# Patient Record
Sex: MALE | Race: WHITE | NOT HISPANIC OR LATINO | Employment: OTHER | URBAN - METROPOLITAN AREA
[De-identification: names, ages, dates, MRNs, and addresses within clinical notes are randomized per-mention and may not be internally consistent; named-entity substitution may affect disease eponyms.]

---

## 2017-01-10 ENCOUNTER — OFFICE VISIT (OUTPATIENT)
Dept: PODIATRY | Facility: CLINIC | Age: 72
End: 2017-01-10
Payer: MEDICARE

## 2017-01-10 VITALS — TEMPERATURE: 97.4 F | DIASTOLIC BLOOD PRESSURE: 64 MMHG | SYSTOLIC BLOOD PRESSURE: 142 MMHG

## 2017-01-10 DIAGNOSIS — I87.8 VENOUS STASIS: ICD-10-CM

## 2017-01-10 DIAGNOSIS — B35.1 DERMATOPHYTOSIS OF NAIL: ICD-10-CM

## 2017-01-10 DIAGNOSIS — E11.49 DIABETIC NEUROPATHY WITH NEUROLOGIC COMPLICATION (H): ICD-10-CM

## 2017-01-10 DIAGNOSIS — L97.312 ULCER OF ANKLE, RIGHT, WITH FAT LAYER EXPOSED (H): Primary | ICD-10-CM

## 2017-01-10 DIAGNOSIS — E11.40 DIABETIC NEUROPATHY WITH NEUROLOGIC COMPLICATION (H): ICD-10-CM

## 2017-01-10 PROCEDURE — 99213 OFFICE O/P EST LOW 20 MIN: CPT | Performed by: PODIATRIST

## 2017-01-10 RX ORDER — SILVER SULFADIAZINE 10 MG/G
CREAM TOPICAL 2 TIMES DAILY
Qty: 85 G | Refills: 4 | Status: SHIPPED | OUTPATIENT
Start: 2017-01-10 | End: 2017-01-17

## 2017-01-10 RX ORDER — AMMONIUM LACTATE 12 G/100G
CREAM TOPICAL 2 TIMES DAILY PRN
Qty: 385 G | Refills: 3 | Status: ON HOLD | OUTPATIENT
Start: 2017-01-10 | End: 2017-08-30

## 2017-01-10 RX ORDER — TRIAMCINOLONE ACETONIDE 1 MG/G
CREAM TOPICAL
Qty: 453.6 G | Refills: 0 | Status: ON HOLD | OUTPATIENT
Start: 2017-01-10 | End: 2017-08-25

## 2017-01-10 RX ORDER — KETOCONAZOLE 20 MG/G
CREAM TOPICAL 2 TIMES DAILY
Qty: 60 G | Refills: 4 | Status: ON HOLD | OUTPATIENT
Start: 2017-01-10 | End: 2017-08-25

## 2017-01-10 NOTE — NURSING NOTE
"Amos Walker's goals for this visit include: Evaluate right shin wound  He requests these members of his care team be copied on today's visit information: yes    PCP: Silvina Patiño    Referring Provider:  No referring provider defined for this encounter.    Chief Complaint   Patient presents with     RECHECK     New wound on right shin, x 30 days       Initial /64 mmHg  Temp(Src) 97.4  F (36.3  C) Estimated body mass index is 24.52 kg/(m^2) as calculated from the following:    Height as of 6/2/15: 1.892 m (6' 2.49\").    Weight as of 10/31/16: 87.771 kg (193 lb 8 oz).  BP completed using cuff size: regular    "

## 2017-01-10 NOTE — PATIENT INSTRUCTIONS
Thanks for coming today.  [facility]  [address] [place] [address]    To schedule future appointments in Ortho Clinic, you may call [phone].    To schedule ordered imaging by your provider:   Call [facility] [phone]    To schedule an injection ordered by your provider:  Call [facility] Injection scheduling line: [phone]  [email or web address] available online at:  [email or web address]    Please call if any further questions or concerns ([phone]).  Clinic hours 8 am to 5 pm.    Return to clinic (call) if symptoms worsen or fail to improve.
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
## 2017-01-11 DIAGNOSIS — Z13.6 ENCOUNTER FOR ABDOMINAL AORTIC ANEURYSM (AAA) SCREENING: Primary | ICD-10-CM

## 2017-01-18 ENCOUNTER — OFFICE VISIT (OUTPATIENT)
Dept: PODIATRY | Facility: CLINIC | Age: 72
End: 2017-01-18
Payer: MEDICARE

## 2017-01-18 VITALS — SYSTOLIC BLOOD PRESSURE: 145 MMHG | DIASTOLIC BLOOD PRESSURE: 70 MMHG | OXYGEN SATURATION: 100 % | HEART RATE: 97 BPM

## 2017-01-18 DIAGNOSIS — E11.49 DIABETIC NEUROPATHY WITH NEUROLOGIC COMPLICATION (H): ICD-10-CM

## 2017-01-18 DIAGNOSIS — E11.40 DIABETIC NEUROPATHY WITH NEUROLOGIC COMPLICATION (H): ICD-10-CM

## 2017-01-18 DIAGNOSIS — I87.8 VENOUS STASIS: ICD-10-CM

## 2017-01-18 DIAGNOSIS — L97.312 ULCER OF ANKLE, RIGHT, WITH FAT LAYER EXPOSED (H): Primary | ICD-10-CM

## 2017-01-18 PROCEDURE — 97597 DBRDMT OPN WND 1ST 20 CM/<: CPT | Performed by: PODIATRIST
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
## 2017-01-18 NOTE — NURSING NOTE
"Amos Walker's goals for this visit include: Find a solution for right leg wound  He requests these members of his care team be copied on today's visit information: yes    PCP: Silvina Patiño    Referring Provider:  No referring provider defined for this encounter.    Chief Complaint   Patient presents with     RECHECK     Right shin wound recheck       Initial /70 mmHg  Pulse 97  SpO2 100% Estimated body mass index is 24.52 kg/(m^2) as calculated from the following:    Height as of 6/2/15: 1.892 m (6' 2.49\").    Weight as of 10/31/16: 87.771 kg (193 lb 8 oz).  BP completed using cuff size: large    "

## 2017-01-18 NOTE — PATIENT INSTRUCTIONS
Thanks for coming today.  [facility]  [address] [place] [address]    To schedule future appointments in Ortho Clinic, you may call [phone].    To schedule ordered imaging by your Provider: Call [facility] at [phone]    [employer] available online at:   [email or web address]    Please call if any further questions or concerns [phone] and ask for the Orthopedic Department. Clinic hours 8 am to 5 pm.    Return to clinic if symptoms worsen.

## 2017-01-24 ENCOUNTER — OFFICE VISIT (OUTPATIENT)
Dept: PODIATRY | Facility: CLINIC | Age: 72
End: 2017-01-24
Payer: MEDICARE

## 2017-01-24 VITALS — DIASTOLIC BLOOD PRESSURE: 64 MMHG | SYSTOLIC BLOOD PRESSURE: 140 MMHG

## 2017-01-24 DIAGNOSIS — E11.40 DIABETIC NEUROPATHY WITH NEUROLOGIC COMPLICATION (H): ICD-10-CM

## 2017-01-24 DIAGNOSIS — E11.49 DIABETIC NEUROPATHY WITH NEUROLOGIC COMPLICATION (H): ICD-10-CM

## 2017-01-24 DIAGNOSIS — L97.912 ULCER OF RIGHT LOWER LEG, WITH FAT LAYER EXPOSED (H): Primary | ICD-10-CM

## 2017-01-24 DIAGNOSIS — I87.8 VENOUS STASIS: ICD-10-CM

## 2017-01-24 PROCEDURE — 97597 DBRDMT OPN WND 1ST 20 CM/<: CPT | Performed by: PODIATRIST

## 2017-01-24 ASSESSMENT — PAIN SCALES - GENERAL: PAINLEVEL: MILD PAIN (2)
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
## 2017-01-24 NOTE — NURSING NOTE
"Amos Walker's goals for this visit include: Recheck right ankle ulcer.  He requests these members of his care team be copied on today's visit information: yes    PCP: Silvina Patiño    Referring Provider:  No referring provider defined for this encounter.    Chief Complaint   Patient presents with     RECHECK     Recheck right ankle ulcer       Initial /64 mmHg Estimated body mass index is 24.52 kg/(m^2) as calculated from the following:    Height as of 6/2/15: 1.892 m (6' 2.49\").    Weight as of 10/31/16: 87.771 kg (193 lb 8 oz).  BP completed using cuff size: regular    "

## 2017-01-24 NOTE — PATIENT INSTRUCTIONS
Thanks for coming today.  [facility]  [address] [place] [address]    To schedule future appointments in Ortho Clinic, you may call [phone].    To schedule ordered imaging by your provider:   Call [facility] [phone]    To schedule an injection ordered by your provider:  Call [facility] Injection scheduling line: [phone]  [facility]hart available online at:  [email or web address]    Please call if any further questions or concerns ([phone]).  Clinic hours 8 am to 5 pm.    Return to clinic (call) if symptoms worsen or fail to improve.

## 2017-01-26 ENCOUNTER — OFFICE VISIT (OUTPATIENT)
Dept: AUDIOLOGY | Facility: CLINIC | Age: 72
End: 2017-01-26

## 2017-01-26 DIAGNOSIS — H90.6 MIXED HEARING LOSS, BILATERAL: Primary | ICD-10-CM

## 2017-01-31 ENCOUNTER — OFFICE VISIT (OUTPATIENT)
Dept: PODIATRY | Facility: CLINIC | Age: 72
End: 2017-01-31
Payer: MEDICARE

## 2017-01-31 VITALS — SYSTOLIC BLOOD PRESSURE: 122 MMHG | DIASTOLIC BLOOD PRESSURE: 68 MMHG

## 2017-01-31 DIAGNOSIS — E11.49 DIABETIC NEUROPATHY WITH NEUROLOGIC COMPLICATION (H): ICD-10-CM

## 2017-01-31 DIAGNOSIS — E11.40 DIABETIC NEUROPATHY WITH NEUROLOGIC COMPLICATION (H): ICD-10-CM

## 2017-01-31 DIAGNOSIS — I87.8 VENOUS STASIS: ICD-10-CM

## 2017-01-31 DIAGNOSIS — L97.912 ULCER OF RIGHT LOWER LEG, WITH FAT LAYER EXPOSED (H): Primary | ICD-10-CM

## 2017-01-31 PROCEDURE — 99212 OFFICE O/P EST SF 10 MIN: CPT | Performed by: PODIATRIST

## 2017-01-31 ASSESSMENT — PAIN SCALES - GENERAL: PAINLEVEL: MILD PAIN (2)

## 2017-01-31 NOTE — NURSING NOTE
"Amos Walker's goals for this visit include: Recheck left leg ulcer   He requests these members of his care team be copied on today's visit information: yes    PCP: Silvina Patiño    Referring Provider:  No referring provider defined for this encounter.    Chief Complaint   Patient presents with     RECHECK     Recheck right lower leg ulcer       Initial /68 mmHg Estimated body mass index is 24.52 kg/(m^2) as calculated from the following:    Height as of 6/2/15: 1.892 m (6' 2.49\").    Weight as of 10/31/16: 87.771 kg (193 lb 8 oz).  BP completed using cuff size: regular    "
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
## 2017-01-31 NOTE — PATIENT INSTRUCTIONS
Thanks for coming today.  [facility]  [address] [place] [address]    To schedule future appointments in Ortho Clinic, you may call [phone].    To schedule ordered imaging by your provider:   Call [facility] [phone]    To schedule an injection ordered by your provider:  Call [facility] Injection scheduling line: [phone]  [email or web address] available online at:  [email or web address]    Please call if any further questions or concerns ([phone]).  Clinic hours 8 am to 5 pm.    Return to clinic (call) if symptoms worsen or fail to improve.

## 2017-02-06 ENCOUNTER — OFFICE VISIT (OUTPATIENT)
Dept: INTERNAL MEDICINE | Facility: CLINIC | Age: 72
End: 2017-02-06

## 2017-02-06 ENCOUNTER — OFFICE VISIT (OUTPATIENT)
Dept: AUDIOLOGY | Facility: CLINIC | Age: 72
End: 2017-02-06

## 2017-02-06 VITALS
BODY MASS INDEX: 23.67 KG/M2 | RESPIRATION RATE: 16 BRPM | HEART RATE: 90 BPM | SYSTOLIC BLOOD PRESSURE: 125 MMHG | WEIGHT: 186.8 LBS | DIASTOLIC BLOOD PRESSURE: 75 MMHG

## 2017-02-06 DIAGNOSIS — N18.30 CKD (CHRONIC KIDNEY DISEASE) STAGE 3, GFR 30-59 ML/MIN (H): ICD-10-CM

## 2017-02-06 DIAGNOSIS — Z12.11 SCREEN FOR COLON CANCER: ICD-10-CM

## 2017-02-06 DIAGNOSIS — J43.9 PULMONARY EMPHYSEMA, UNSPECIFIED EMPHYSEMA TYPE (H): ICD-10-CM

## 2017-02-06 DIAGNOSIS — Z79.4 TYPE 2 DIABETES MELLITUS WITH DIABETIC POLYNEUROPATHY, WITH LONG-TERM CURRENT USE OF INSULIN (H): Primary | ICD-10-CM

## 2017-02-06 DIAGNOSIS — Z79.4 TYPE 2 DIABETES MELLITUS WITH DIABETIC POLYNEUROPATHY, WITH LONG-TERM CURRENT USE OF INSULIN (H): ICD-10-CM

## 2017-02-06 DIAGNOSIS — H90.6 MIXED HEARING LOSS, BILATERAL: Primary | ICD-10-CM

## 2017-02-06 DIAGNOSIS — E11.9 TYPE 2 DIABETES, HBA1C GOAL < 7% (H): Primary | ICD-10-CM

## 2017-02-06 DIAGNOSIS — E11.42 TYPE 2 DIABETES MELLITUS WITH DIABETIC POLYNEUROPATHY, WITH LONG-TERM CURRENT USE OF INSULIN (H): Primary | ICD-10-CM

## 2017-02-06 DIAGNOSIS — E11.42 TYPE 2 DIABETES MELLITUS WITH DIABETIC POLYNEUROPATHY, WITH LONG-TERM CURRENT USE OF INSULIN (H): ICD-10-CM

## 2017-02-06 DIAGNOSIS — I10 BENIGN ESSENTIAL HYPERTENSION: ICD-10-CM

## 2017-02-06 LAB
ANION GAP SERPL CALCULATED.3IONS-SCNC: 9 MMOL/L (ref 3–14)
BUN SERPL-MCNC: 35 MG/DL (ref 7–30)
CALCIUM SERPL-MCNC: 8.3 MG/DL (ref 8.5–10.1)
CHLORIDE SERPL-SCNC: 108 MMOL/L (ref 94–109)
CO2 SERPL-SCNC: 22 MMOL/L (ref 20–32)
CREAT SERPL-MCNC: 1.28 MG/DL (ref 0.66–1.25)
CREAT UR-MCNC: 75 MG/DL
GFR SERPL CREATININE-BSD FRML MDRD: 56 ML/MIN/1.7M2
GLUCOSE SERPL-MCNC: 218 MG/DL (ref 70–99)
HBA1C MFR BLD: 6.3 % (ref 4.3–6)
POTASSIUM SERPL-SCNC: 4.6 MMOL/L (ref 3.4–5.3)
PROT UR-MCNC: 0.16 G/L
PROT/CREAT 24H UR: 0.21 G/G CR (ref 0–0.2)
SODIUM SERPL-SCNC: 139 MMOL/L (ref 133–144)

## 2017-02-06 RX ORDER — HYDROCHLOROTHIAZIDE 12.5 MG/1
12.5 TABLET ORAL DAILY
Qty: 90 TABLET | Refills: 3 | Status: ON HOLD | OUTPATIENT
Start: 2017-02-06 | End: 2017-08-25

## 2017-02-06 ASSESSMENT — PAIN SCALES - GENERAL: PAINLEVEL: NO PAIN (0)

## 2017-02-06 NOTE — MR AVS SNAPSHOT
After Visit Summary   2/6/2017    Amos Walker    MRN: [id number]           Patient Information     Date Of Birth          1945        Visit Information        Provider Department      2/6/2017 11:00 AM Silvina Patiño MD [facility]        Today's Diagnoses     Type 2 diabetes mellitus with diabetic polyneuropathy, with long-term current use of insulin (H)    -  1     CKD (chronic kidney disease) stage 3, GFR 30-59 ml/min         Screen for colon cancer         Pulmonary emphysema, unspecified emphysema type (H)         Benign essential hypertension           Care Instructions    [facility] Center Medication Refill Request Information:  * Please contact your pharmacy regarding ANY request for medication refills.  ** [facility] Prescription Fax = [phone]  * Please allow 3 business days for routine medication refills.  * Please allow 5 business days for controlled substance medication refills.     Primary Care Center Test Result notification information:  *You will be notified with in 7-10 days of your appointment day regarding the results of your test.  If you are on MyChart you will be notified as soon as the provider has reviewed the results and signed off on them.    Provider notes    It was a pleasure caring for you today at the [facility].     1.  Follow up with me in 4 months  2. Schedule lung function tests  3. Restart hydrochlorothiazide 12. 5 mg daily.   4. Colonoscopy    Please return to clinic for follow up in  4 months      Silvina Patiño MD    Please schedule the following appointments:  Colonoscopy:   [facility] - [phone]  PFT Pulmonary Function Testing [phone] ([facility] 3rd floor)              Follow-ups after your visit        Additional Services     GASTROENTEROLOGY ADULT REF PROCEDURE ONLY       Last Lab Result: CREATININE (mg/dL)       Date                     Value                 06/27/2016               1.21              ----------  There is no weight on file to calculate BMI.     Needed:  No  Language:  English    Patient will be contacted to schedule procedure.     Please be aware that coverage of these services is subject to the terms and limitations of your health insurance plan.  Call member services at your health plan with any benefit or coverage questions.  Any procedures must be performed at a [facility] facility OR coordinated by your clinic's referral office.    Please bring the following with you to your appointment:    (1) Any X-Rays, CTs or MRIs which have been performed.  Contact the facility where they were done to arrange for  prior to your scheduled appointment.    (2) List of current medications   (3) This referral request   (4) Any documents/labs given to you for this referral                  Your next 10 appointments already scheduled     Feb 07, 2017 11:00 AM   Return [facility] Brayan Mcclain DPM   [facility]    [address] [place] [id number] [phone]            Jun 14, 2017 10:30 AM   RETURN RETINA with Jen Aranda MD   [facility] ([facility])    [address]  [facility] [address] [phone]              Future tests that were ordered for you today     Open Future Orders        Priority Expected Expires Ordered    General PFT Lab (Please always keep checked) Routine  2/6/2018 2/6/2017    Pulmonary Function Test Routine  2/6/2018 2/6/2017    Hemoglobin A1c Routine  2/23/2017 2/6/2017    Basic metabolic panel Routine  2/23/2017 2/6/2017    Protein  random urine Routine  2/23/2017 2/6/2017            Who to contact     Please call your clinic at [phone] to:    Ask questions about your health    Make or cancel appointments    Discuss your medicines    Learn about your test results    Speak to your doctor   If you have compliments or  concerns about an experience at your clinic, or if you wish to file a complaint, please contact [facility] Patient Relations at [phone] or email us at [email or web address]         Additional Information About Your Visit        [employer] Information     [employer] gives you secure access to your electronic health record. If you see a primary care provider, you can also send messages to your care team and make appointments. If you have questions, please call your primary care clinic.  If you do not have a primary care provider, please call [phone] and they will assist you.      [employer] is an electronic gateway that provides easy, online access to your medical records. With [employer], you can request a clinic appointment, read your test results, renew a prescription or communicate with your care team.     To access your existing account, please contact your [facility] or call [phone] for assistance.        Care EveryWhere ID     This is your Care EveryWhere ID. This could be used by other organizations to access your [facility] medical records  [id number]        Your Vitals Were     Pulse Respirations                52 16           Blood Pressure from Last 3 Encounters:   02/06/17 160/82   01/31/17 122/68   01/24/17 140/64    Weight from Last 3 Encounters:   02/06/17 84.732 kg (186 lb 12.8 oz)   10/31/16 87.771 kg (193 lb 8 oz)   06/27/16 85.004 kg (187 lb 6.4 oz)              We Performed the Following     GASTROENTEROLOGY ADULT REF PROCEDURE ONLY          Today's Medication Changes          These changes are accurate as of: 2/6/17 11:35 AM.  If you have any questions, ask your nurse or doctor.               Start taking these medicines.        Dose/Directions    hydrochlorothiazide 12.5 MG Tabs tablet   Used for:  Benign essential hypertension   Started by:  Silvina Patiño MD        Dose:  12.5 mg   Take 1 tablet (12.5 mg) by mouth  daily   Quantity:  90 tablet   Refills:  3         These medicines have changed or have updated prescriptions.        Dose/Directions    triamcinolone 0.1 % cream   Commonly known as:  KENALOG   This may have changed:  Another medication with the same name was removed. Continue taking this medication, and follow the directions you see here.   Used for:  Venous stasis   Changed by:  Brayan Mcclain DPM        Apply sparingly daily to affected areas on feet and legs.   Quantity:  453.6 g   Refills:  0         Stop taking these medicines if you haven't already. Please contact your care team if you have questions.     levofloxacin 750 MG tablet   Commonly known as:  LEVAQUIN   Stopped by:  Silvina Patiño MD                Where to get your medicines      These medications were sent to [facility] [address] [place], [place] [address]E NE AT [place] [id number] [facility] [address]     Phone:  [phone]    - hydrochlorothiazide 12.5 MG Tabs tablet  - sitagliptin 100 MG tablet             Primary Care Provider Office Phone # Fax #    Silvina Patiño [phone]       [address] [address]        Thank you!     Thank you for choosing [facility]  for your care. Our goal is always to provide you with excellent care. Hearing back from our patients is one way we can continue to improve our services. Please take a few minutes to complete the written survey that you may receive in the mail after your visit with us. Thank you!             Your Updated Medication List - Protect others around you: Learn how to safely use, store and throw away your medicines at www.disposemymeds.org.          This list is accurate as of: 2/6/17 11:35 AM.  Always use your most recent med list.                   Brand Name Dispense Instructions for use    * ammonium lactate 12 % cream    AMLACTIN    300 g    Apply twice daily to  legs       * ammonium lactate 12 % cream    LAC-HYDRIN    385 g    Apply topically 2 times daily as needed for dry skin       ascorbic acid 1000 MG Tabs    vitamin C     Take 1,000 mg by mouth daily       aspirin 81 MG chewable tablet      Take 81 mg by mouth daily       blood glucose monitoring lancets     3 Box    Use to test blood sugars 2 as directed.       blood glucose monitoring test strip    ONE TOUCH ULTRA    60 strip    Use to test blood sugars 2 times daily or as directed.       ciprofloxacin-dexamethasone otic suspension    CIPRODEX    1 Bottle    Place 4 drops into both ears 2 times daily       clopidogrel 75 MG tablet    PLAVIX    30 tablet    Take 1 tablet (75 mg) by mouth daily       * econazole nitrate 1 % cream     85 g    Apply topically 2 times daily       * econazole nitrate 1 % cream     85 g    Apply topically 2 times daily       ferrous sulfate 325 (65 FE) MG tablet    IRON    60 tablet    Take 1 tablet (325 mg) by mouth 2 times daily       gentamicin 0.1 % ointment    GARAMYCIN    30 g    Apply topically daily To right leg ulcer daily.       hydrochlorothiazide 12.5 MG Tabs tablet     90 tablet    Take 1 tablet (12.5 mg) by mouth daily       hydrocortisone 0.2 % cream    WESTCORT    15 g    Apply sparingly to affected area twice times daily for 14 days.       insulin glargine 100 UNIT/ML injection    LANTUS SOLOSTAR    9 mL    Inject 25 Units Subcutaneous At Bedtime       insulin pen needle 31G X 8 MM    B-D U/F    100 each    Use 1 daily or as directed       * ketoconazole 2 % cream    NIZORAL    60 g    Apply topically 2 times daily       * ketoconazole 2 % cream    NIZORAL    60 g    Apply topically 2 times daily To toenails.       lisinopril 40 MG tablet    PRINIVIL/ZESTRIL    90 tablet    Take 1 tablet (40 mg) by mouth daily       mupirocin 2 % ointment    BACTROBAN    22 g    Apply topically daily       nateglinide 120 MG tablet    STARLIX    90 tablet    TAKE 1 TABLET BY MOUTH THREE  "TIMES DAILY BEFORE MEALS       nystatin Powd     60 g    Apply twice daily to feet       nystatin-triamcinolone cream    MYCOLOG II    60 g    Apply topically 2 times daily       OPTIFOAM 6\"X6\" Pads     1 each    1 Box once a week       * order for DME     2 each    Please measure and distribute 1 pair of 20mm Hg - 30mm Hg knee high ULCER CARE open or closed toe compression stockings.  Patient has a size 13 foot and please take this into consideration.  Jobst or equivalent       * order for DME     3 each    Please measure and distribute 1 pair of 20mmHg - 30mmHg knee high open or closed toe compression stockings. Jobst ultrasheer or equivalent.       oxyCODONE 5 MG IR tablet    ROXICODONE    20 tablet    Take 1 tablet (5 mg) by mouth every 4 hours as needed for moderate to severe pain       sildenafil 50 MG cap/tab    REVATIO/VIAGRA    10 tablet    Take 1 tablet (50 mg) by mouth daily as needed for erectile dysfunction       simvastatin 10 MG tablet    ZOCOR    90 tablet    Take 1 tablet (10 mg) by mouth At Bedtime       sitagliptin 100 MG tablet    JANUVIA    90 tablet    Take 1 tablet (100 mg) by mouth daily       SSD 1 % cream   Generic drug:  silver sulfADIAZINE          triamcinolone 0.1 % cream    KENALOG    453.6 g    Apply sparingly daily to affected areas on feet and legs.       VITAMIN D3 PO      Take by mouth daily       * Notice:  This list has 8 medication(s) that are the same as other medications prescribed for you. Read the directions carefully, and ask your doctor or other care provider to review them with you.      "

## 2017-02-06 NOTE — PROGRESS NOTES
AUDIOLOGY REPORT    BACKGROUND INFORMATION: Aoms Walker was seen in Audiology at the [facility] on 2/6/2017 for a hearing aid check. 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 He was fit with binaural Olga S CRT V MOIRA hearing aids at an outside clinic. Today the patient reports that his ears have been itching. He also complains of difficulty hearing in the presence of background noise. He believes that the volume of the left hearing aid needs to be increased. The patient complains that loud singing voices are sometimes too loud. He also reports that the telephone programs needs to be louder.     TEST RESULTS AND PROCEDURES:   Otoscopy revealed an abnormal green debris and redness in both ear canals. I discussed with the patient that I recommend that he follow up with ENT due to these findings. The hearing aids were connected to programming software. Overall gain was increased in the left hearing aid by 5 dB, compression ratios were reduced for speech in party noise, compression kneepoints were raised, overall gain for the telephone programs was increased 4 dB, and gain for loud input high frequency sounds was decreased 2 dB. The patient reports improved balance between the ears and sound quality.     SUMMARY AND RECOMMENDATIONS: Follow up programming of Phonak Olga S CRT V MOIRA hearing aids was completed today. Adjustments were made as noted above. It is recommended that the patient follow up with ENT due to possible external ear infections. It is also recommended that he return for a hearing evaluation and further hearing aid adjustments after his ears are clear of infection.  Call this clinic with questions regarding today s visit.    Eliceo Cox.  Licensed Audiologist  [place] #[id number]

## 2017-02-06 NOTE — NURSING NOTE
Chief Complaint   Patient presents with     Diabetes     patient states he is here to discuss dm check     GRETTA BURRIS at 11:03 AM on 2/6/2017.

## 2017-02-06 NOTE — PROGRESS NOTES
PRIMARY [facility]ARE CENTER       SUBJECTIVE:  Amos Walker is a 71 year old male with pmh of type II DM complicated by peripheral neuropathy, PAD, CKD, HTN who comes in for follow up with me today.     1. Amos was very concerned about his CT of chest.   This showed emphysema changes. Pt has a long h/o smoking.  In the past was up to 2-3 ppd.  He has been working on gradually cutting back.  Now at 1/2 ppd at this time.  He wants to try adn quit altogether because he was worried about this.  No c/o cough. Will occ have some shortness of breath, but it is not bothersome to him .  Will walk 2 miles per day without significant sxs.  He has tried patches, gum as well as zyban in the past.  He does not want to take mediactions for this.  Wants to do it on his own.  Lacey smoking cessation consult with pharm D.      2. Cont to follow with Dr. Chappell in podiatry for PAD.  Healing ulcerations.  Walking has helped to maintain him and overall he was told he is dong much better.  Just saw him three weeks ago and has appt with them tomorrow.        3. He had a couple of falls since in last.  Got up at 5 am and missed the commode and banged his back on the wall and under his L leg.   Also had a slip on the ice and hit his R elbow.  Also bruising.  Hit head on back of step when slipped.  No LOC assoc with this.  Layed there for a minute and got up and was okay.  This was 2-3 weeks.  No headaches, concussion sxs.  No confusion, n/v at all.   Has a h/o neck fusion and wanted to make sure this is ok.  No weakness, numbness or tingling in hands/arms.     4. Sugars- has not been watching as closely, but when he checks they are mostly at goal.  Had one close to bedtime at 235.  Fastings have all been at goal.  They were a little higher over the holidays.  He brought in monitor for my review and they all appear to be at goal.     Medications and allergies reviewed by me today.       Review Of Systems    10 point ROS  of systems including Constitutional, Eyes, Respiratory, Cardiovascular, Pulmonary, Gastroenterology, Genitourinary, Integumentary, Musculoskeletal, Psychiatric were all negative except for pertinent positives noted in my HPI.       OBJECTIVE:    /75 mmHg  Pulse 90  Resp 16  Wt 84.732 kg (186 lb 12.8 oz)   Wt Readings from Last 1 Encounters:   02/06/17 84.732 kg (186 lb 12.8 oz)       Constitutional: no distress, comfortable, pleasant   HEENT anicteric  PERRL  OP clear  Neck:  supple with full range of motion, no thyromegaly.   Nodes: no cervical, supraclavicular or axillary lad  Cardiovascular: regular rate and rhythm, normal S1 and S2, no murmurs, rubs or gallops,   Respiratory: clear to auscultation, no wheezes or crackles, normal breath sounds   Gastrointestinal: NT/ND  positive bowel sounds all four quadrants no hepatosplenomegaly, no masses   Ext:   no edema      ASSESSMENT/PLAN:  Pt is a 71 year old male here for follow up of mult issues.   Amos was seen today for diabetes.    Diagnoses and all orders for this visit:    Type 2 diabetes mellitus with diabetic polyneuropathy, with long-term current use of insulin (H)- small amt of weight loss and his A1c has been gradually improving.  Likely thsi is due to his regular walking now.  Congratulated patient on his changes in diet and more active lifestyle.    -     Hemoglobin A1c; Future    CKD (chronic kidney disease) stage 3, GFR 30-59 ml/min- labs today.   -     Basic metabolic panel; Future  -     Protein  random urine; Future    Screen for colon cancer- pt to schedule colonoscopy at his convenience.   -     GASTROENTEROLOGY ADULT REF PROCEDURE ONLY    Likely COPD secondary to smoking: recommended that he schedule PFTs.  Discussed ongoin efforts a smoking cessation and pt states he would like to do this on his own without assistance.  Offered MDI (spiriva) for COPD with ? Sxs, but he declined.  He will schedule PFTs prior to his next visit with me.      HTN: adding back hctz 12.5 mg daily due to bump in blood pressures.  Will cont to monitor at home.     Pt should return to clinic for f/u with me in 3  months          Silvina Patiño MD

## 2017-02-06 NOTE — PATIENT INSTRUCTIONS
[facility] Medication Refill Request Information:  * Please contact your pharmacy regarding ANY request for medication refills.  ** [facility] = [phone]  * Please allow 3 business days for routine medication refills.  * Please allow 5 business days for controlled substance medication refills.     [facility] Test Result notification information:  *You will be notified with in 7-10 days of your appointment day regarding the results of your test.  If you are on MyChart you will be notified as soon as the provider has reviewed the results and signed off on them.    Provider notes    It was a pleasure caring for you today at the [facility].     1.  Follow up with me in 4 months  2. Schedule lung function tests  3. Restart hydrochlorothiazide 12. 5 mg daily.   4. Colonoscopy    Please return to clinic for follow up in  4 months      Silvina Patiño MD    Please schedule the following appointments:  Colonoscopy:   [facility] - [phone]  [facility] ([facility]) - [phone] [address], [address]  [place] [address]     PFT Pulmonary Function Testing [phone] ([facility] 3rd floor)

## 2017-02-07 ENCOUNTER — OFFICE VISIT (OUTPATIENT)
Dept: PODIATRY | Facility: CLINIC | Age: 72
End: 2017-02-07
Payer: MEDICARE

## 2017-02-07 VITALS — DIASTOLIC BLOOD PRESSURE: 72 MMHG | SYSTOLIC BLOOD PRESSURE: 157 MMHG | OXYGEN SATURATION: 100 % | HEART RATE: 100 BPM

## 2017-02-07 DIAGNOSIS — L97.912 ULCER OF RIGHT LOWER LEG, WITH FAT LAYER EXPOSED (H): Primary | ICD-10-CM

## 2017-02-07 DIAGNOSIS — E11.51 DIABETES MELLITUS WITH PERIPHERAL VASCULAR DISEASE (H): ICD-10-CM

## 2017-02-07 PROCEDURE — 97597 DBRDMT OPN WND 1ST 20 CM/<: CPT | Performed by: PODIATRIST

## 2017-02-07 NOTE — PATIENT INSTRUCTIONS
Thanks for coming today.  Ortho/Sports Medicine Clinic  [address] [place] [address]    To schedule future appointments in Ortho Clinic, you may call [phone].    To schedule ordered imaging by your Provider: Call [facility] at [phone]    [email or web address] available online at:   [email or web address]    Please call if any further questions or concerns [phone] and ask for the Orthopedic Department. Clinic hours 8 am to 5 pm.    Return to clinic if symptoms worsen.

## 2017-02-07 NOTE — NURSING NOTE
"Amos Walker's goals for this visit include: Right foot wound care  He requests these members of his care team be copied on today's visit information: yes    PCP: Silvina Patiño    Referring Provider:  No referring provider defined for this encounter.    Chief Complaint   Patient presents with     RECHECK     right foot wound care       Initial /72 mmHg  Pulse 100  SpO2 100% Estimated body mass index is 23.67 kg/(m^2) as calculated from the following:    Height as of 6/2/15: 1.892 m (6' 2.49\").    Weight as of 2/6/17: 84.732 kg (186 lb 12.8 oz).  Medication Reconciliation: complete    "
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
## 2017-02-08 DIAGNOSIS — E11.9 TYPE 2 DIABETES MELLITUS (H): Primary | ICD-10-CM

## 2017-02-14 ENCOUNTER — OFFICE VISIT (OUTPATIENT)
Dept: PODIATRY | Facility: CLINIC | Age: 72
End: 2017-02-14
Payer: MEDICARE

## 2017-02-14 VITALS — DIASTOLIC BLOOD PRESSURE: 60 MMHG | SYSTOLIC BLOOD PRESSURE: 110 MMHG

## 2017-02-14 DIAGNOSIS — L97.912 ULCER OF RIGHT LOWER LEG, WITH FAT LAYER EXPOSED (H): Primary | ICD-10-CM

## 2017-02-14 DIAGNOSIS — I87.8 VENOUS STASIS: ICD-10-CM

## 2017-02-14 DIAGNOSIS — E11.40 TYPE 2 DIABETES, CONTROLLED, WITH NEUROPATHY (H): ICD-10-CM

## 2017-02-14 PROCEDURE — 97597 DBRDMT OPN WND 1ST 20 CM/<: CPT | Performed by: PODIATRIST

## 2017-02-14 NOTE — MR AVS SNAPSHOT
After Visit Summary   2017    Amos Walker    MRN: [id number]           Patient Information     Date Of Birth          1945        Visit Information        Provider Department      2017 11:30 AM Brayan Mcclain DPM [facility]        Today's Diagnoses     Ulcer of right lower leg, with fat layer exposed (H)    -  1    Type 2 diabetes, controlled, with neuropathy (H)        Venous stasis          Care Instructions    Thanks for coming today.  [facility] Medicine Clinic  [address] [place] [address]    To schedule future appointments in Ortho Clinic, you may call [phone].    To schedule ordered imaging by your provider:   Call Central Imaging [phone]    To schedule an injection ordered by your provider:  Call Central Imaging Injection scheduling line: [phone]  [email or web address] available online at:  [email or web address]    Please call if any further questions or concerns ([phone]).  Clinic hours 8 am to 5 pm.    Return to clinic (call) if symptoms worsen or fail to improve.          Follow-ups after your visit        Your next 10 appointments already scheduled     2017 11:00 AM CST   Return Visit with Brayan Mcclain DPM   [facility])    [address] [place] [address]   [phone]            2017 10:30 AM CST   Return Visit with Brayan Mcclain DPM   [facility])    [address] [facility] [address]   [phone]            Mar 07, 2017 11:30 AM CST   Return Visit with Brayan Mcclain DPM   [facility]    [address] [facility] [address]   [phone]            Mar 14, 2017 10:30 AM CDT   Return Visit with Brayan Mcclain DPM   [facility] ([facility])    [address] [place] [address]  [facility] [address]   [phone]            Jun 14, 2017 10:30 AM CDT   RETURN RETINA with Jen Aranda MD   [facility] ([facility])    [address]  [address] [facility]  9th [address] Clin [address]  [facility] [address]   [phone]              Who to contact     If you have questions or need follow up information about today's clinic visit or your schedule please contact [facility] directly at [phone].  Normal or non-critical lab and imaging results will be communicated to you by Obatechhart, letter or phone within 4 business days after the clinic has received the results. If you do not hear from us within 7 days, please contact the clinic through Obatechhart or phone. If you have a critical or abnormal lab result, we will notify you by phone as soon as possible.  Submit refill requests through [email or web address] or call your pharmacy and they will forward the refill request to us. Please allow 3 business days for your refill to be completed.          Additional Information About Your Visit        ObatechhareEvent Information     [email or web address] gives you secure access to your electronic health record. If you see a primary care provider, you can also send messages to your care team and make appointments. If you have questions, please call your primary care clinic.  If you do not have a primary care provider, please call [phone] and they will assist you.      [email or web address] is an electronic gateway that provides easy, online access to your medical records. With [email or web address], you can request a clinic appointment, read your test results, renew a prescription or communicate with your care team.     To access your existing account, please contact your [facility] or call [phone] for assistance.        Care EveryWhere ID     This is your Care EveryWhere ID. This could be used by other organizations to access your [place] medical records  [id number]         Blood Pressure  from Last 3 Encounters:   02/14/17 110/60   02/07/17 157/72   02/06/17 125/75    Weight from Last 3 Encounters:   02/06/17 84.7 kg (186 lb 12.8 oz)   10/31/16 87.8 kg (193 lb 8 oz)   06/27/16 85 kg (187 lb 6.4 oz)              We Performed the Following     DEBRIDEMENT WOUND UP TO 20 SQ CM        Primary Care Provider Office Phone # Fax #    Silvina Patiño [phone]       [address] [address]        Thank you!     Thank you for choosing [facility]  for your care. Our goal is always to provide you with excellent care. Hearing back from our patients is one way we can continue to improve our services. Please take a few minutes to complete the written survey that you may receive in the mail after your visit with us. Thank you!             Your Updated Medication List - Protect others around you: Learn how to safely use, store and throw away your medicines at www.disposemymeds.org.          This list is accurate as of: 2/14/17  1:47 PM.  Always use your most recent med list.                   Brand Name Dispense Instructions for use    * ammonium lactate 12 % cream    AMLACTIN    300 g    Apply twice daily to legs       * ammonium lactate 12 % cream    LAC-HYDRIN    385 g    Apply topically 2 times daily as needed for dry skin       ascorbic acid 1000 MG Tabs    vitamin C     Take 1,000 mg by mouth daily       aspirin 81 MG chewable tablet      Take 81 mg by mouth daily       blood glucose monitoring lancets     3 Box    Use to test blood sugars 2 as directed.       blood glucose monitoring test strip    ONE TOUCH ULTRA    60 strip    Use to test blood sugars 2 times daily or as directed.       ciprofloxacin-dexamethasone otic suspension    CIPRODEX    1 Bottle    Place 4 drops into both ears 2 times daily       clopidogrel 75 MG tablet    PLAVIX    30 tablet    Take 1 tablet (75 mg) by mouth daily       * econazole nitrate 1 % cream      "85 g    Apply topically 2 times daily       * econazole nitrate 1 % cream     85 g    Apply topically 2 times daily       ferrous sulfate 325 (65 FE) MG tablet    IRON    60 tablet    Take 1 tablet (325 mg) by mouth 2 times daily       gentamicin 0.1 % ointment    GARAMYCIN    30 g    Apply topically daily To right leg ulcer daily.       hydrochlorothiazide 12.5 MG Tabs tablet     90 tablet    Take 1 tablet (12.5 mg) by mouth daily       hydrocortisone 0.2 % cream    WESTCORT    15 g    Apply sparingly to affected area twice times daily for 14 days.       insulin glargine 100 UNIT/ML injection    LANTUS SOLOSTAR    9 mL    Inject 25 Units Subcutaneous At Bedtime       insulin pen needle 31G X 8 MM    B-D U/F    100 each    Use 1 daily or as directed       * ketoconazole 2 % cream    NIZORAL    60 g    Apply topically 2 times daily       * ketoconazole 2 % cream    NIZORAL    60 g    Apply topically 2 times daily To toenails.       lisinopril 40 MG tablet    PRINIVIL/ZESTRIL    90 tablet    Take 1 tablet (40 mg) by mouth daily       mupirocin 2 % ointment    BACTROBAN    22 g    Apply topically daily       nateglinide 120 MG tablet    STARLIX    90 tablet    TAKE 1 TABLET BY MOUTH THREE TIMES DAILY BEFORE MEALS       nystatin Powd     60 g    Apply twice daily to feet       nystatin-triamcinolone cream    MYCOLOG II    60 g    Apply topically 2 times daily       OPTIFOAM 6\"X6\" Pads     1 each    1 Box once a week       * order for DME     2 each    Please measure and distribute 1 pair of 20mm Hg - 30mm Hg knee high ULCER CARE open or closed toe compression stockings.  Patient has a size 13 foot and please take this into consideration.  Jobst or equivalent       * order for DME     3 each    Please measure and distribute 1 pair of 20mmHg - 30mmHg knee high open or closed toe compression stockings. Jobst ultrasheer or equivalent.       oxyCODONE 5 MG IR tablet    ROXICODONE    20 tablet    Take 1 tablet (5 mg) by mouth " every 4 hours as needed for moderate to severe pain       sildenafil 50 MG cap/tab    REVATIO/VIAGRA    10 tablet    Take 1 tablet (50 mg) by mouth daily as needed for erectile dysfunction       simvastatin 10 MG tablet    ZOCOR    90 tablet    Take 1 tablet (10 mg) by mouth At Bedtime       sitagliptin 100 MG tablet    JANUVIA    90 tablet    Take 1 tablet (100 mg) by mouth daily       SSD 1 % cream   Generic drug:  silver sulfADIAZINE          triamcinolone 0.1 % cream    KENALOG    453.6 g    Apply sparingly daily to affected areas on feet and legs.       VITAMIN D3 PO      Take by mouth daily       * Notice:  This list has 8 medication(s) that are the same as other medications prescribed for you. Read the directions carefully, and ask your doctor or other care provider to review them with you.
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
## 2017-02-14 NOTE — NURSING NOTE
"Amos Walker's goals for this visit include: Recheck right leg ulcer.   He requests these members of his care team be copied on today's visit information: yes    PCP: Silvina Patiño    Referring Provider:  No referring provider defined for this encounter.    Chief Complaint   Patient presents with     RECHECK     Recheck lower right leg ulcer       Initial /60 Estimated body mass index is 23.67 kg/(m^2) as calculated from the following:    Height as of 6/2/15: 1.892 m (6' 2.49\").    Weight as of 2/6/17: 84.7 kg (186 lb 12.8 oz).  Medication Reconciliation: complete    "

## 2017-02-14 NOTE — PATIENT INSTRUCTIONS
Thanks for coming today.  Or[facility] Medicine Clinic  [address] [place] [address]    To schedule future appointments in Ortho Clinic, you may call [phone].    To schedule ordered imaging by your provider:   Call Central Imaging [phone]    To schedule an injection ordered by your provider:  Call Central Imaging Injection scheduling line: [phone]  [email or web address] available online at:  [email or web address]    Please call if any further questions or concerns ([phone]).  Clinic hours 8 am to 5 pm.    Return to clinic (call) if symptoms worsen or fail to improve.

## 2017-02-21 ENCOUNTER — OFFICE VISIT (OUTPATIENT)
Dept: PODIATRY | Facility: CLINIC | Age: 72
End: 2017-02-21
Payer: MEDICARE

## 2017-02-21 ENCOUNTER — DOCUMENTATION ONLY (OUTPATIENT)
Dept: VASCULAR SURGERY | Facility: CLINIC | Age: 72
End: 2017-02-21

## 2017-02-21 VITALS
SYSTOLIC BLOOD PRESSURE: 128 MMHG | HEART RATE: 90 BPM | OXYGEN SATURATION: 99 % | TEMPERATURE: 97.7 F | DIASTOLIC BLOOD PRESSURE: 59 MMHG

## 2017-02-21 DIAGNOSIS — L97.912 ULCER OF RIGHT LOWER LEG, WITH FAT LAYER EXPOSED (H): Primary | ICD-10-CM

## 2017-02-21 DIAGNOSIS — E11.51 DIABETES MELLITUS WITH PERIPHERAL VASCULAR DISEASE (H): ICD-10-CM

## 2017-02-21 DIAGNOSIS — I87.8 VENOUS STASIS: ICD-10-CM

## 2017-02-21 PROCEDURE — 99213 OFFICE O/P EST LOW 20 MIN: CPT | Performed by: PODIATRIST

## 2017-02-21 RX ORDER — SILVER SULFADIAZINE 10 MG/G
CREAM TOPICAL 2 TIMES DAILY
Qty: 85 G | Refills: 2 | Status: SHIPPED | OUTPATIENT
Start: 2017-02-21 | End: 2017-02-28

## 2017-02-21 NOTE — NURSING NOTE
"Amos Walker's goals for this visit include: Recheck right leg wound  He requests these members of his care team be copied on today's visit information: yes    PCP: Silvina Patiño    Referring Provider:  No referring provider defined for this encounter.    Chief Complaint   Patient presents with     RECHECK     Recheck right leg ulcer       Initial There were no vitals taken for this visit. Estimated body mass index is 23.67 kg/(m^2) as calculated from the following:    Height as of 6/2/15: 1.892 m (6' 2.49\").    Weight as of 2/6/17: 84.7 kg (186 lb 12.8 oz).  Medication Reconciliation: complete    "
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
## 2017-02-21 NOTE — MR AVS SNAPSHOT
After Visit Summary   2/21/2017    Amos Walker    MRN: [id number]           Patient Information     Date Of Birth          1945        Visit Information        Provider Department      2/21/2017 11:00 AM Brayan Mcclain DPM [facility]        Today's Diagnoses     Ulcer of right lower leg, with fat layer exposed (H)    -  1    Venous stasis        Diabetes mellitus with peripheral vascular disease (H)          Care Instructions    Thanks for coming today.  Ortho/Sports Medicine Clinic  [address] [place] [address]    To schedule future appointments in Ortho Clinic, you may call [phone].    To schedule ordered imaging by your Provider: Call [facility] at [phone]    Milk Mantra available online at:   [email or web address]    Please call if any further questions or concerns [phone] and ask for the Orthopedic Department. Clinic hours 8 am to 5 pm.    Return to clinic if symptoms worsen.          Follow-ups after your visit        Your next 10 appointments already scheduled     Mar 07, 2017 11:30 AM CST   Return Visit with Brayan Mcclain DPM   [facility] ([facility])    [address] [place] [address] [phone]            Mar 14, 2017 10:30 AM CDT   Return Visit with Brayan Mcclain DPM   [facility] ([facility])    [address] [place] [address] [phone]            Jun 14, 2017 10:30 AM CDT   RETURN RETINA with Jen Aranda MD   Eye Clinic ([facility])    Sony Chery [address]  [facility]a  [facility] [id number] [phone]              Who to contact     If you have questions or need follow up information about today's clinic visit or your schedule please contact [facility] directly at [phone].  Normal or non-critical lab and imaging results will be  communicated to you by ComEdhart, letter or phone within 4 business days after the clinic has received the results. If you do not hear from us within 7 days, please contact the clinic through Providajob or phone. If you have a critical or abnormal lab result, we will notify you by phone as soon as possible.  Submit refill requests through Providajob or call your pharmacy and they will forward the refill request to us. Please allow 3 business days for your refill to be completed.          Additional Information About Your Visit        Providajob Information     Providajob gives you secure access to your electronic health record. If you see a primary care provider, you can also send messages to your care team and make appointments. If you have questions, please call your primary care clinic.  If you do not have a primary care provider, please call [phone] and they will assist you.      Providajob is an electronic gateway that provides easy, online access to your medical records. With Providajob, you can request a clinic appointment, read your test results, renew a prescription or communicate with your care team.     To access your existing account, please contact your [facility] or call [phone] for assistance.        Care EveryWhere ID     This is your Care EveryWhere ID. This could be used by other organizations to access your [facility] medical records  [id number]        Your Vitals Were     Pulse Temperature Pulse Oximetry             90 97.7  F (36.5  C) (Oral) 99%          Blood Pressure from Last 3 Encounters:   02/21/17 128/59   02/14/17 110/60   02/07/17 157/72    Weight from Last 3 Encounters:   02/06/17 84.7 kg (186 lb 12.8 oz)   10/31/16 87.8 kg (193 lb 8 oz)   06/27/16 85 kg (187 lb 6.4 oz)              Today, you had the following     No orders found for display         Today's Medication Changes          These changes are accurate as of: 2/21/17  1:03 PM.  If you have any questions,  ask your nurse or doctor.               These medicines have changed or have updated prescriptions.        Dose/Directions    * SSD 1 % cream   This may have changed:  Another medication with the same name was added. Make sure you understand how and when to take each.   Generic drug:  silver sulfADIAZINE   Changed by:  Brayan Mcclain DPM        Refills:  0       * silver sulfADIAZINE 1 % cream   Commonly known as:  SILVADENE   This may have changed:  You were already taking a medication with the same name, and this prescription was added. Make sure you understand how and when to take each.   Used for:  Ulcer of right lower leg, with fat layer exposed (H), Venous stasis, Diabetes mellitus with peripheral vascular disease (H)   Changed by:  Brayan Mcclain DPM        Apply topically 2 times daily for 7 days   Quantity:  85 g   Refills:  2       * Notice:  This list has 2 medication(s) that are the same as other medications prescribed for you. Read the directions carefully, and ask your doctor or other care provider to review them with you.         Where to get your medicines      These medications were sent to [facility] [address] [place], [place] [address]E [place] AT [address] [facility] [address]     Phone:  [phone]     silver sulfADIAZINE 1 % cream                Primary Care Provider Office Phone # Fax #    Silvina Patiño [phone]       [address] [address]        Thank you!     Thank you for choosing [facility]  for your care. Our goal is always to provide you with excellent care. Hearing back from our patients is one way we can continue to improve our services. Please take a few minutes to complete the written survey that you may receive in the mail after your visit with us. Thank you!             Your Updated Medication List - Protect others around you: Learn how to safely use,  store and throw away your medicines at www.disposemymeds.org.          This list is accurate as of: 2/21/17  1:03 PM.  Always use your most recent med list.                   Brand Name Dispense Instructions for use    * ammonium lactate 12 % cream    AMLACTIN    300 g    Apply twice daily to legs       * ammonium lactate 12 % cream    LAC-HYDRIN    385 g    Apply topically 2 times daily as needed for dry skin       ascorbic acid 1000 MG Tabs    vitamin C     Take 1,000 mg by mouth daily       aspirin 81 MG chewable tablet      Take 81 mg by mouth daily       blood glucose monitoring lancets     3 Box    Use to test blood sugars 2 as directed.       blood glucose monitoring test strip    ONE TOUCH ULTRA    60 strip    Use to test blood sugars 2 times daily or as directed.       ciprofloxacin-dexamethasone otic suspension    CIPRODEX    1 Bottle    Place 4 drops into both ears 2 times daily       clopidogrel 75 MG tablet    PLAVIX    30 tablet    Take 1 tablet (75 mg) by mouth daily       * econazole nitrate 1 % cream     85 g    Apply topically 2 times daily       * econazole nitrate 1 % cream     85 g    Apply topically 2 times daily       ferrous sulfate 325 (65 FE) MG tablet    IRON    60 tablet    Take 1 tablet (325 mg) by mouth 2 times daily       gentamicin 0.1 % ointment    GARAMYCIN    30 g    Apply topically daily To right leg ulcer daily.       hydrochlorothiazide 12.5 MG Tabs tablet     90 tablet    Take 1 tablet (12.5 mg) by mouth daily       hydrocortisone 0.2 % cream    WESTCORT    15 g    Apply sparingly to affected area twice times daily for 14 days.       insulin glargine 100 UNIT/ML injection    LANTUS SOLOSTAR    9 mL    Inject 25 Units Subcutaneous At Bedtime       insulin pen needle 31G X 8 MM    B-D U/F    100 each    Use 1 daily or as directed       * ketoconazole 2 % cream    NIZORAL    60 g    Apply topically 2 times daily       * ketoconazole 2 % cream    NIZORAL    60 g    Apply topically  "2 times daily To toenails.       lisinopril 40 MG tablet    PRINIVIL/ZESTRIL    90 tablet    Take 1 tablet (40 mg) by mouth daily       mupirocin 2 % ointment    BACTROBAN    22 g    Apply topically daily       nateglinide 120 MG tablet    STARLIX    90 tablet    TAKE 1 TABLET BY MOUTH THREE TIMES DAILY BEFORE MEALS       nystatin Powd     60 g    Apply twice daily to feet       nystatin-triamcinolone cream    MYCOLOG II    60 g    Apply topically 2 times daily       OPTIFOAM 6\"X6\" Pads     1 each    1 Box once a week       * order for DME     2 each    Please measure and distribute 1 pair of 20mm Hg - 30mm Hg knee high ULCER CARE open or closed toe compression stockings.  Patient has a size 13 foot and please take this into consideration.  Jobst or equivalent       * order for DME     3 each    Please measure and distribute 1 pair of 20mmHg - 30mmHg knee high open or closed toe compression stockings. Jobst ultrasheer or equivalent.       oxyCODONE 5 MG IR tablet    ROXICODONE    20 tablet    Take 1 tablet (5 mg) by mouth every 4 hours as needed for moderate to severe pain       sildenafil 50 MG cap/tab    REVATIO/VIAGRA    10 tablet    Take 1 tablet (50 mg) by mouth daily as needed for erectile dysfunction       simvastatin 10 MG tablet    ZOCOR    90 tablet    Take 1 tablet (10 mg) by mouth At Bedtime       sitagliptin 100 MG tablet    JANUVIA    90 tablet    Take 1 tablet (100 mg) by mouth daily       * SSD 1 % cream   Generic drug:  silver sulfADIAZINE          * silver sulfADIAZINE 1 % cream    SILVADENE    85 g    Apply topically 2 times daily for 7 days       triamcinolone 0.1 % cream    KENALOG    453.6 g    Apply sparingly daily to affected areas on feet and legs.       VITAMIN D3 PO      Take by mouth daily       * Notice:  This list has 10 medication(s) that are the same as other medications prescribed for you. Read the directions carefully, and ask your doctor or other care provider to review them with " you.

## 2017-02-21 NOTE — PATIENT INSTRUCTIONS
Thanks for coming today.  Ortho/Sports Medicine Clinic  [address] [place] [address]    To schedule future appointments in Ortho Clinic, you may call [phone].    To schedule ordered imaging by your Provider: Call [facility] at [phone]    Taggable available online at:   [email or web address]    Please call if any further questions or concerns [phone] and ask for the Orthopedic Department. Clinic hours 8 am to 5 pm.    Return to clinic if symptoms worsen.

## 2017-03-07 ENCOUNTER — OFFICE VISIT (OUTPATIENT)
Dept: PODIATRY | Facility: CLINIC | Age: 72
End: 2017-03-07
Payer: MEDICARE

## 2017-03-07 VITALS — OXYGEN SATURATION: 98 % | HEART RATE: 96 BPM | DIASTOLIC BLOOD PRESSURE: 68 MMHG | SYSTOLIC BLOOD PRESSURE: 133 MMHG

## 2017-03-07 DIAGNOSIS — L97.912 ULCER OF RIGHT LOWER LEG, WITH FAT LAYER EXPOSED (H): Primary | ICD-10-CM

## 2017-03-07 DIAGNOSIS — E11.40 DIABETIC NEUROPATHY WITH NEUROLOGIC COMPLICATION (H): ICD-10-CM

## 2017-03-07 DIAGNOSIS — E11.49 DIABETIC NEUROPATHY WITH NEUROLOGIC COMPLICATION (H): ICD-10-CM

## 2017-03-07 DIAGNOSIS — I87.8 VENOUS STASIS: ICD-10-CM

## 2017-03-07 LAB
GRAM STN SPEC: ABNORMAL
MICRO REPORT STATUS: ABNORMAL
SPECIMEN SOURCE: ABNORMAL

## 2017-03-07 PROCEDURE — 87186 SC STD MICRODIL/AGAR DIL: CPT | Performed by: PODIATRIST

## 2017-03-07 PROCEDURE — 87075 CULTR BACTERIA EXCEPT BLOOD: CPT | Mod: 90 | Performed by: PODIATRIST

## 2017-03-07 PROCEDURE — 99000 SPECIMEN HANDLING OFFICE-LAB: CPT | Performed by: PODIATRIST

## 2017-03-07 PROCEDURE — 97597 DBRDMT OPN WND 1ST 20 CM/<: CPT | Performed by: PODIATRIST

## 2017-03-07 PROCEDURE — 87205 SMEAR GRAM STAIN: CPT | Mod: 90 | Performed by: PODIATRIST

## 2017-03-07 PROCEDURE — 87070 CULTURE OTHR SPECIMN AEROBIC: CPT | Mod: 90 | Performed by: PODIATRIST

## 2017-03-07 PROCEDURE — 87077 CULTURE AEROBIC IDENTIFY: CPT | Performed by: PODIATRIST

## 2017-03-07 NOTE — PATIENT INSTRUCTIONS
Thanks for coming today.  Ortho/Sports Medicine Clinic  [address] [place] [address]    To schedule future appointments in Ortho Clinic, you may call [phone].    To schedule ordered imaging by your Provider: Call [facility] at [phone]    WeeWorld available online at:   [email or web address]    Please call if any further questions or concerns [phone] and ask for the Orthopedic Department. Clinic hours 8 am to 5 pm.    Return to clinic if symptoms worsen.

## 2017-03-07 NOTE — MR AVS SNAPSHOT
After Visit Summary   3/7/2017    Amos Walker    MRN: [id number]           Patient Information     Date Of Birth          1945        Visit Information        Provider Department      3/7/2017 11:30 AM Brayan Mcclain DPM [facility]        Today's Diagnoses     Ulcer of right lower leg, with fat layer exposed (H)    -  1    Diabetic neuropathy with neurologic complication (H)        Venous stasis          Care Instructions    Thanks for coming today.  Ortho/Sports Medicine Clinic  [address] [place] [address]    To schedule future appointments in Ortho Clinic, you may call [phone].    To schedule ordered imaging by your Provider: Call [facility] at [phone]    MediaPlatform available online at:   [email or web address]    Please call if any further questions or concerns [phone] and ask for the Orthopedic Department. Clinic hours 8 am to 5 pm.    Return to clinic if symptoms worsen.          Follow-ups after your visit        Your next 10 appointments already scheduled     Mar 14, 2017 10:30 AM CDT   Return Visit with Brayan Mcclain DPM   [facility] ([facility])    [address] [place] [id number] [phone]            Jun 14, 2017 10:30 AM CDT   RETURN RETINA with Jen Aranda MD   Eye Clinic ([facility])    Sony Chery [facility]  [address] [place] [address]  9[facility] 9a  [facility] [address] [phone]              Who to contact     If you have questions or need follow up information about today's clinic visit or your schedule please contact [facility] directly at [phone].  Normal or non-critical lab and imaging results will be communicated to you by MyChart, letter or phone within 4 business days after the clinic has received the results. If you do not hear from us within 7 days, please contact the clinic through MyChart or phone. If  you have a critical or abnormal lab result, we will notify you by phone as soon as possible.  Submit refill requests through [email or web address] or call your pharmacy and they will forward the refill request to us. Please allow 3 business days for your refill to be completed.          Additional Information About Your Visit        Cognitive Securityhart Information     [email or web address] gives you secure access to your electronic health record. If you see a primary care provider, you can also send messages to your care team and make appointments. If you have questions, please call your primary care clinic.  If you do not have a primary care provider, please call [phone] and they will assist you.      [email or web address] is an electronic gateway that provides easy, online access to your medical records. With [email or web address], you can request a clinic appointment, read your test results, renew a prescription or communicate with your care team.     To access your existing account, please contact your [facility] or call [phone] for assistance.        Care EveryWhere ID     This is your Care EveryWhere ID. This could be used by other organizations to access your [facility] medical records  [id number]        Your Vitals Were     Pulse Pulse Oximetry                96 98%           Blood Pressure from Last 3 Encounters:   03/07/17 133/68   02/21/17 128/59   02/14/17 110/60    Weight from Last 3 Encounters:   02/06/17 84.7 kg (186 lb 12.8 oz)   10/31/16 87.8 kg (193 lb 8 oz)   06/27/16 85 kg (187 lb 6.4 oz)              We Performed the Following     Anaerobic bacterial culture     DEBRIDEMENT WOUND UP TO 20 SQ CM     Gram stain     Wound Culture Aerobic Bacterial        Primary Care Provider Office Phone # Fax #    Silvina Patiño [phone]       [address] [address]        Thank you!     Thank you for choosing [facility]  for your care. Our goal is always to  provide you with excellent care. Hearing back from our patients is one way we can continue to improve our services. Please take a few minutes to complete the written survey that you may receive in the mail after your visit with us. Thank you!             Your Updated Medication List - Protect others around you: Learn how to safely use, store and throw away your medicines at www.disposemymeds.org.          This list is accurate as of: 3/7/17 11:59 PM.  Always use your most recent med list.                   Brand Name Dispense Instructions for use    * ammonium lactate 12 % cream    AMLACTIN    300 g    Apply twice daily to legs       * ammonium lactate 12 % cream    LAC-HYDRIN    385 g    Apply topically 2 times daily as needed for dry skin       ascorbic acid 1000 MG Tabs    vitamin C     Take 1,000 mg by mouth daily       aspirin 81 MG chewable tablet      Take 81 mg by mouth daily       blood glucose monitoring lancets     3 Box    Use to test blood sugars 2 as directed.       blood glucose monitoring test strip    ONE TOUCH ULTRA    60 strip    Use to test blood sugars 2 times daily or as directed.       ciprofloxacin-dexamethasone otic suspension    CIPRODEX    1 Bottle    Place 4 drops into both ears 2 times daily       clopidogrel 75 MG tablet    PLAVIX    30 tablet    Take 1 tablet (75 mg) by mouth daily       * econazole nitrate 1 % cream     85 g    Apply topically 2 times daily       * econazole nitrate 1 % cream     85 g    Apply topically 2 times daily       ferrous sulfate 325 (65 FE) MG tablet    IRON    60 tablet    Take 1 tablet (325 mg) by mouth 2 times daily       gentamicin 0.1 % ointment    GARAMYCIN    30 g    Apply topically daily To right leg ulcer daily.       hydrochlorothiazide 12.5 MG Tabs tablet     90 tablet    Take 1 tablet (12.5 mg) by mouth daily       hydrocortisone 0.2 % cream    WESTCORT    15 g    Apply sparingly to affected area twice times daily for 14 days.       insulin  "glargine 100 UNIT/ML injection    LANTUS SOLOSTAR    9 mL    Inject 25 Units Subcutaneous At Bedtime       insulin pen needle 31G X 8 MM    B-D U/F    100 each    Use 1 daily or as directed       * ketoconazole 2 % cream    NIZORAL    60 g    Apply topically 2 times daily       * ketoconazole 2 % cream    NIZORAL    60 g    Apply topically 2 times daily To toenails.       lisinopril 40 MG tablet    PRINIVIL/ZESTRIL    90 tablet    Take 1 tablet (40 mg) by mouth daily       mupirocin 2 % ointment    BACTROBAN    22 g    Apply topically daily       nateglinide 120 MG tablet    STARLIX    90 tablet    TAKE 1 TABLET BY MOUTH THREE TIMES DAILY BEFORE MEALS       nystatin Powd     60 g    Apply twice daily to feet       nystatin-triamcinolone cream    MYCOLOG II    60 g    Apply topically 2 times daily       OPTIFOAM 6\"X6\" Pads     1 each    1 Box once a week       * order for DME     2 each    Please measure and distribute 1 pair of 20mm Hg - 30mm Hg knee high ULCER CARE open or closed toe compression stockings.  Patient has a size 13 foot and please take this into consideration.  Jobst or equivalent       * order for DME     3 each    Please measure and distribute 1 pair of 20mmHg - 30mmHg knee high open or closed toe compression stockings. Jobst ultrasheer or equivalent.       oxyCODONE 5 MG IR tablet    ROXICODONE    20 tablet    Take 1 tablet (5 mg) by mouth every 4 hours as needed for moderate to severe pain       sildenafil 50 MG cap/tab    REVATIO/VIAGRA    10 tablet    Take 1 tablet (50 mg) by mouth daily as needed for erectile dysfunction       simvastatin 10 MG tablet    ZOCOR    90 tablet    Take 1 tablet (10 mg) by mouth At Bedtime       sitagliptin 100 MG tablet    JANUVIA    90 tablet    Take 1 tablet (100 mg) by mouth daily       SSD 1 % cream   Generic drug:  silver sulfADIAZINE          triamcinolone 0.1 % cream    KENALOG    453.6 g    Apply sparingly daily to affected areas on feet and legs.       " VITAMIN D3 PO      Take by mouth daily       * Notice:  This list has 8 medication(s) that are the same as other medications prescribed for you. Read the directions carefully, and ask your doctor or other care provider to review them with you.
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
## 2017-03-10 LAB
BACTERIA SPEC CULT: ABNORMAL
MICRO REPORT STATUS: ABNORMAL
MICROORGANISM SPEC CULT: ABNORMAL
MICROORGANISM SPEC CULT: ABNORMAL
SPECIMEN SOURCE: ABNORMAL

## 2017-03-14 ENCOUNTER — OFFICE VISIT (OUTPATIENT)
Dept: PODIATRY | Facility: CLINIC | Age: 72
End: 2017-03-14
Payer: MEDICARE

## 2017-03-14 VITALS — HEART RATE: 109 BPM | SYSTOLIC BLOOD PRESSURE: 130 MMHG | OXYGEN SATURATION: 99 % | DIASTOLIC BLOOD PRESSURE: 73 MMHG

## 2017-03-14 DIAGNOSIS — I87.8 VENOUS STASIS: ICD-10-CM

## 2017-03-14 DIAGNOSIS — L97.912 ULCER OF RIGHT LOWER LEG, WITH FAT LAYER EXPOSED (H): Primary | ICD-10-CM

## 2017-03-14 LAB
BACTERIA SPEC CULT: NORMAL
Lab: NORMAL
MICRO REPORT STATUS: NORMAL
SPECIMEN SOURCE: NORMAL

## 2017-03-14 PROCEDURE — 99213 OFFICE O/P EST LOW 20 MIN: CPT | Performed by: PODIATRIST

## 2017-03-14 RX ORDER — LEVOFLOXACIN 750 MG/1
750 TABLET, FILM COATED ORAL DAILY
Qty: 14 TABLET | Refills: 0 | Status: SHIPPED | OUTPATIENT
Start: 2017-03-14 | End: 2017-04-18

## 2017-03-14 NOTE — PATIENT INSTRUCTIONS
Thanks for coming today.  Ortho/Sports Medicine Clinic  [address] [place] [address]    To schedule future appointments in Ortho Clinic, you may call [phone].    To schedule ordered imaging by your Provider: Call [facility] at [phone]    TherapeuticsMD available online at:   [email or web address]    Please call if any further questions or concerns [phone] and ask for the Orthopedic Department. Clinic hours 8 am to 5 pm.    Return to clinic if symptoms worsen.
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
## 2017-03-14 NOTE — MR AVS SNAPSHOT
After Visit Summary   3/14/2017    Amos Walker    MRN: [id number]           Patient Information     Date Of Birth          1945        Visit Information        Provider Department      3/14/2017 10:30 AM Brayan Mcclain DPM [facility]        Today's Diagnoses     Ulcer of right lower leg, with fat layer exposed (H)    -  1    Venous stasis          Care Instructions    Thanks for coming today.  Ortho[facility] Medicine Clinic  [id number] [place] [id number]    To schedule future appointments in Ortho Clinic, you may call [phone].    To schedule ordered imaging by your Provider: Call [facility] at [phone]    [employer] available online at:   [email or web address]    Please call if any further questions or concerns [phone] and ask for the Orthopedic Department. Clinic hours 8 am to 5 pm.    Return to clinic if symptoms worsen.          Follow-ups after your visit        Your next 10 appointments already scheduled     Mar 28, 2017  9:30 AM CDT   Return Visit with Brayan Mcclain DPM   [facility]    [address] [place] [id number] [phone]            Jun 14, 2017 10:30 AM CDT   RETURN RETINA with Jen Aranda MD   [facility] ([facility])    [address] [place] [address]  [facility] [id number] [phone]              Who to contact     If you have questions or need follow up information about today's clinic visit or your schedule please contact [facility] directly at [phone].  Normal or non-critical lab and imaging results will be communicated to you by MyChart, letter or phone within 4 business days after the clinic has received the results. If you do not hear from us within 7 days, please contact the clinic through MyChart or phone. If you have a critical or abnormal lab result, we will  notify you by phone as soon as possible.  Submit refill requests through Snapverse or call your pharmacy and they will forward the refill request to us. Please allow 3 business days for your refill to be completed.          Additional Information About Your Visit        [employer] Information     Snapverse gives you secure access to your electronic health record. If you see a primary care provider, you can also send messages to your care team and make appointments. If you have questions, please call your primary care clinic.  If you do not have a primary care provider, please call [phone] and they will assist you.      Snapverse is an electronic gateway that provides easy, online access to your medical records. With Snapverse, you can request a clinic appointment, read your test results, renew a prescription or communicate with your care team.     To access your existing account, please contact your [facility] or call [phone] for assistance.        Care EveryWhere ID     This is your Care EveryWhere ID. This could be used by other organizations to access your [facility] medical records  [id number]        Your Vitals Were     Pulse Pulse Oximetry                109 99%           Blood Pressure from Last 3 Encounters:   03/14/17 130/73   03/07/17 133/68   02/21/17 128/59    Weight from Last 3 Encounters:   02/06/17 84.7 kg (186 lb 12.8 oz)   10/31/16 87.8 kg (193 lb 8 oz)   06/27/16 85 kg (187 lb 6.4 oz)              Today, you had the following     No orders found for display         Today's Medication Changes          These changes are accurate as of: 3/14/17 11:59 PM.  If you have any questions, ask your nurse or doctor.               Start taking these medicines.        Dose/Directions    levofloxacin 750 MG tablet   Commonly known as:  LEVAQUIN   Used for:  Ulcer of right lower leg, with fat layer exposed (H), Venous stasis        Dose:  750 mg   Take 1 tablet (750 mg) by mouth daily    Quantity:  14 tablet   Refills:  0            Where to get your medicines      These medications were sent to [id number] - [place], [place] - [facility] AT [facility] [address], [facility] [address]     Phone:  [phone]     levofloxacin 750 MG tablet                Primary Care Provider Office Phone # Fax #    Silvina Patiño [phone]       [place] [address] [address]        Thank you!     Thank you for choosing [facility]  for your care. Our goal is always to provide you with excellent care. Hearing back from our patients is one way we can continue to improve our services. Please take a few minutes to complete the written survey that you may receive in the mail after your visit with us. Thank you!             Your Updated Medication List - Protect others around you: Learn how to safely use, store and throw away your medicines at www.disposemymeds.org.          This list is accurate as of: 3/14/17 11:59 PM.  Always use your most recent med list.                   Brand Name Dispense Instructions for use    * ammonium lactate 12 % cream    AMLACTIN    300 g    Apply twice daily to legs       * ammonium lactate 12 % cream    LAC-HYDRIN    385 g    Apply topically 2 times daily as needed for dry skin       ascorbic acid 1000 MG Tabs    vitamin C     Take 1,000 mg by mouth daily       aspirin 81 MG chewable tablet      Take 81 mg by mouth daily       blood glucose monitoring lancets     3 Box    Use to test blood sugars 2 as directed.       blood glucose monitoring test strip    ONE TOUCH ULTRA    60 strip    Use to test blood sugars 2 times daily or as directed.       ciprofloxacin-dexamethasone otic suspension    CIPRODEX    1 Bottle    Place 4 drops into both ears 2 times daily       clopidogrel 75 MG tablet    PLAVIX    30 tablet    Take 1 tablet (75 mg) by mouth daily       * econazole  "nitrate 1 % cream     85 g    Apply topically 2 times daily       * econazole nitrate 1 % cream     85 g    Apply topically 2 times daily       ferrous sulfate 325 (65 FE) MG tablet    IRON    60 tablet    Take 1 tablet (325 mg) by mouth 2 times daily       gentamicin 0.1 % ointment    GARAMYCIN    30 g    Apply topically daily To right leg ulcer daily.       hydrochlorothiazide 12.5 MG Tabs tablet     90 tablet    Take 1 tablet (12.5 mg) by mouth daily       hydrocortisone 0.2 % cream    WESTCORT    15 g    Apply sparingly to affected area twice times daily for 14 days.       insulin glargine 100 UNIT/ML injection    LANTUS SOLOSTAR    9 mL    Inject 25 Units Subcutaneous At Bedtime       insulin pen needle 31G X 8 MM    B-D U/F    100 each    Use 1 daily or as directed       * ketoconazole 2 % cream    NIZORAL    60 g    Apply topically 2 times daily       * ketoconazole 2 % cream    NIZORAL    60 g    Apply topically 2 times daily To toenails.       levofloxacin 750 MG tablet    LEVAQUIN    14 tablet    Take 1 tablet (750 mg) by mouth daily       lisinopril 40 MG tablet    PRINIVIL/ZESTRIL    90 tablet    Take 1 tablet (40 mg) by mouth daily       mupirocin 2 % ointment    BACTROBAN    22 g    Apply topically daily       nateglinide 120 MG tablet    STARLIX    90 tablet    TAKE 1 TABLET BY MOUTH THREE TIMES DAILY BEFORE MEALS       nystatin Powd     60 g    Apply twice daily to feet       nystatin-triamcinolone cream    MYCOLOG II    60 g    Apply topically 2 times daily       OPTIFOAM 6\"X6\" Pads     1 each    1 Box once a week       * order for DME     2 each    Please measure and distribute 1 pair of 20mm Hg - 30mm Hg knee high ULCER CARE open or closed toe compression stockings.  Patient has a size 13 foot and please take this into consideration.  Jobst or equivalent       * order for DME     3 each    Please measure and distribute 1 pair of 20mmHg - 30mmHg knee high open or closed toe compression stockings. " Jobst ultrasheer or equivalent.       oxyCODONE 5 MG IR tablet    ROXICODONE    20 tablet    Take 1 tablet (5 mg) by mouth every 4 hours as needed for moderate to severe pain       sildenafil 50 MG cap/tab    REVATIO/VIAGRA    10 tablet    Take 1 tablet (50 mg) by mouth daily as needed for erectile dysfunction       simvastatin 10 MG tablet    ZOCOR    90 tablet    Take 1 tablet (10 mg) by mouth At Bedtime       sitagliptin 100 MG tablet    JANUVIA    90 tablet    Take 1 tablet (100 mg) by mouth daily       SSD 1 % cream   Generic drug:  silver sulfADIAZINE          triamcinolone 0.1 % cream    KENALOG    453.6 g    Apply sparingly daily to affected areas on feet and legs.       VITAMIN D3 PO      Take by mouth daily       * Notice:  This list has 8 medication(s) that are the same as other medications prescribed for you. Read the directions carefully, and ask your doctor or other care provider to review them with you.

## 2017-03-14 NOTE — NURSING NOTE
"Amos Walker's goals for this visit include: Right leg recheck  He requests these members of his care team be copied on today's visit information: yes    PCP: Silvina Patiño    Referring Provider:  No referring provider defined for this encounter.    Chief Complaint   Patient presents with     RECHECK     Right leg ulcer recheck       Initial /73  Pulse 109  SpO2 99% Estimated body mass index is 23.67 kg/(m^2) as calculated from the following:    Height as of 6/2/15: 1.892 m (6' 2.49\").    Weight as of 2/6/17: 84.7 kg (186 lb 12.8 oz).  Medication Reconciliation: complete    "

## 2017-03-28 ENCOUNTER — OFFICE VISIT (OUTPATIENT)
Dept: PODIATRY | Facility: CLINIC | Age: 72
End: 2017-03-28
Payer: MEDICARE

## 2017-03-28 VITALS — DIASTOLIC BLOOD PRESSURE: 64 MMHG | SYSTOLIC BLOOD PRESSURE: 126 MMHG | OXYGEN SATURATION: 97 % | HEART RATE: 98 BPM

## 2017-03-28 DIAGNOSIS — E11.51 DIABETES MELLITUS WITH PERIPHERAL VASCULAR DISEASE (H): ICD-10-CM

## 2017-03-28 DIAGNOSIS — L97.912 ULCER OF RIGHT LOWER LEG, WITH FAT LAYER EXPOSED (H): Primary | ICD-10-CM

## 2017-03-28 DIAGNOSIS — I87.8 VENOUS STASIS: ICD-10-CM

## 2017-03-28 PROCEDURE — 99213 OFFICE O/P EST LOW 20 MIN: CPT | Performed by: PODIATRIST
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
## 2017-03-28 NOTE — PATIENT INSTRUCTIONS
Thanks for coming today.  Ortho/Sports Medicine Clinic  [address] [place] [address]    To schedule future appointments in Ortho Clinic, you may call [phone].    To schedule ordered imaging by your provider:   Call [facility] [phone]    To schedule an injection ordered by your provider:  Call [facility] Injection scheduling line: [phone]  [email or web address] available online at:  [email or web address]    Please call if any further questions or concerns ([phone]).  Clinic hours 8 am to 5 pm.    Return to clinic (call) if symptoms worsen or fail to improve.

## 2017-03-28 NOTE — MR AVS SNAPSHOT
After Visit Summary   3/28/2017    Amos Walker    MRN: [id number]           Patient Information     Date Of Birth          1945        Visit Information        Provider Department      3/28/2017 9:30 AM Brayan Mcclain DPM [facility]        Today's Diagnoses     Ulcer of right lower leg, with fat layer exposed (H)    -  1    Venous stasis        Diabetes mellitus with peripheral vascular disease (H)          Care Instructions    Thanks for coming today.  Ortho/Sports Medicine Clinic  [id number] [place] [address]    To schedule future appointments in Ortho Clinic, you may call [phone].    To schedule ordered imaging by your provider:   Call Central Imaging [phone]    To schedule an injection ordered by your provider:  Call Central Imaging Injection scheduling line: [phone]  MyChart available online at:  [email or web address]    Please call if any further questions or concerns ([phone]).  Clinic hours 8 am to 5 pm.    Return to clinic (call) if symptoms worsen or fail to improve.          Follow-ups after your visit        Your next 10 appointments already scheduled     2017  9:30 AM CDT   Return Visit with Brayan Mcclain DPM   [facility] ([facility])    [address] [place] [address] [phone]            2017 10:30 AM CDT   RETURN RETINA with Jen Aranda MD   Eye Clinic ([facility])    Sony Chery [address] Clin [address] [place] [id number] [phone]              Who to contact     If you have questions or need follow up information about today's clinic visit or your schedule please contact [facility] directly at [phone].  Normal or non-critical lab and imaging results will be communicated to you by MyChart, letter or phone within 4 business days after the clinic has  received the results. If you do not hear from us within 7 days, please contact the clinic through Compliance 11 or phone. If you have a critical or abnormal lab result, we will notify you by phone as soon as possible.  Submit refill requests through Compliance 11 or call your pharmacy and they will forward the refill request to us. Please allow 3 business days for your refill to be completed.          Additional Information About Your Visit        JackrabbithariPeen Information     Compliance 11 gives you secure access to your electronic health record. If you see a primary care provider, you can also send messages to your care team and make appointments. If you have questions, please call your primary care clinic.  If you do not have a primary care provider, please call [phone] and they will assist you.      Compliance 11 is an electronic gateway that provides easy, online access to your medical records. With Compliance 11, you can request a clinic appointment, read your test results, renew a prescription or communicate with your care team.     To access your existing account, please contact your [facility] or call [phone] for assistance.        Care EveryWhere ID     This is your Care EveryWhere ID. This could be used by other organizations to access your [place] medical records  [id number]        Your Vitals Were     Pulse Pulse Oximetry                98 97%           Blood Pressure from Last 3 Encounters:   03/28/17 126/64   03/14/17 130/73   03/07/17 133/68    Weight from Last 3 Encounters:   02/06/17 84.7 kg (186 lb 12.8 oz)   10/31/16 87.8 kg (193 lb 8 oz)   06/27/16 85 kg (187 lb 6.4 oz)              Today, you had the following     No orders found for display       Primary Care Provider Office Phone # Fax #    Silvina Patiño [phone]       [address] [address]        Thank you!     Thank you for choosing [facility]  for  your care. Our goal is always to provide you with excellent care. Hearing back from our patients is one way we can continue to improve our services. Please take a few minutes to complete the written survey that you may receive in the mail after your visit with us. Thank you!             Your Updated Medication List - Protect others around you: Learn how to safely use, store and throw away your medicines at www.disposemymeds.org.          This list is accurate as of: 3/28/17 11:50 AM.  Always use your most recent med list.                   Brand Name Dispense Instructions for use    * ammonium lactate 12 % cream    AMLACTIN    300 g    Apply twice daily to legs       * ammonium lactate 12 % cream    LAC-HYDRIN    385 g    Apply topically 2 times daily as needed for dry skin       ascorbic acid 1000 MG Tabs    vitamin C     Take 1,000 mg by mouth daily       aspirin 81 MG chewable tablet      Take 81 mg by mouth daily       blood glucose monitoring lancets     3 Box    Use to test blood sugars 2 as directed.       blood glucose monitoring test strip    ONE TOUCH ULTRA    60 strip    Use to test blood sugars 2 times daily or as directed.       ciprofloxacin-dexamethasone otic suspension    CIPRODEX    1 Bottle    Place 4 drops into both ears 2 times daily       clopidogrel 75 MG tablet    PLAVIX    30 tablet    Take 1 tablet (75 mg) by mouth daily       * econazole nitrate 1 % cream     85 g    Apply topically 2 times daily       * econazole nitrate 1 % cream     85 g    Apply topically 2 times daily       ferrous sulfate 325 (65 FE) MG tablet    IRON    60 tablet    Take 1 tablet (325 mg) by mouth 2 times daily       gentamicin 0.1 % ointment    GARAMYCIN    30 g    Apply topically daily To right leg ulcer daily.       hydrochlorothiazide 12.5 MG Tabs tablet     90 tablet    Take 1 tablet (12.5 mg) by mouth daily       hydrocortisone 0.2 % cream    WESTCORT    15 g    Apply sparingly to affected area twice times  "daily for 14 days.       insulin glargine 100 UNIT/ML injection    LANTUS SOLOSTAR    9 mL    Inject 25 Units Subcutaneous At Bedtime       insulin pen needle 31G X 8 MM    B-D U/F    100 each    Use 1 daily or as directed       * ketoconazole 2 % cream    NIZORAL    60 g    Apply topically 2 times daily       * ketoconazole 2 % cream    NIZORAL    60 g    Apply topically 2 times daily To toenails.       levofloxacin 750 MG tablet    LEVAQUIN    14 tablet    Take 1 tablet (750 mg) by mouth daily       lisinopril 40 MG tablet    PRINIVIL/ZESTRIL    90 tablet    Take 1 tablet (40 mg) by mouth daily       mupirocin 2 % ointment    BACTROBAN    22 g    Apply topically daily       nateglinide 120 MG tablet    STARLIX    90 tablet    TAKE 1 TABLET BY MOUTH THREE TIMES DAILY BEFORE MEALS       nystatin Powd     60 g    Apply twice daily to feet       nystatin-triamcinolone cream    MYCOLOG II    60 g    Apply topically 2 times daily       OPTIFOAM 6\"X6\" Pads     1 each    1 Box once a week       * order for DME     2 each    Please measure and distribute 1 pair of 20mm Hg - 30mm Hg knee high ULCER CARE open or closed toe compression stockings.  Patient has a size 13 foot and please take this into consideration.  Jobst or equivalent       * order for DME     3 each    Please measure and distribute 1 pair of 20mmHg - 30mmHg knee high open or closed toe compression stockings. Jobst ultrasheer or equivalent.       oxyCODONE 5 MG IR tablet    ROXICODONE    20 tablet    Take 1 tablet (5 mg) by mouth every 4 hours as needed for moderate to severe pain       sildenafil 50 MG cap/tab    REVATIO/VIAGRA    10 tablet    Take 1 tablet (50 mg) by mouth daily as needed for erectile dysfunction       simvastatin 10 MG tablet    ZOCOR    90 tablet    Take 1 tablet (10 mg) by mouth At Bedtime       sitagliptin 100 MG tablet    JANUVIA    90 tablet    Take 1 tablet (100 mg) by mouth daily       SSD 1 % cream   Generic drug:  silver " sulfADIAZINE          triamcinolone 0.1 % cream    KENALOG    453.6 g    Apply sparingly daily to affected areas on feet and legs.       VITAMIN D3 PO      Take by mouth daily       * Notice:  This list has 8 medication(s) that are the same as other medications prescribed for you. Read the directions carefully, and ask your doctor or other care provider to review them with you.

## 2017-03-28 NOTE — NURSING NOTE
"Amos Walker's goals for this visit include: Recheck of right leg ulcer  He requests these members of his care team be copied on today's visit information: yes    PCP: Silvina Patiño    Referring Provider:  No referring provider defined for this encounter.    Chief Complaint   Patient presents with     RECHECK     Right leg ulcer       Initial /64 (BP Location: Left arm, Cuff Size: Adult Large)  Pulse 98  SpO2 97% Estimated body mass index is 23.67 kg/(m^2) as calculated from the following:    Height as of 6/2/15: 1.892 m (6' 2.49\").    Weight as of 2/6/17: 84.7 kg (186 lb 12.8 oz).  Medication Reconciliation: complete   Ashley Ellison RN      "

## 2017-03-30 DIAGNOSIS — E11.9 DIABETES MELLITUS (H): ICD-10-CM

## 2017-03-30 NOTE — TELEPHONE ENCOUNTER
[facility]  Last Written Prescription Date:  3/8/16  Last Fill Quantity: 90,   # refills: 11  Last Office Visit : 2/6/17  Future Office visit:  No  Lab Results   Component Value Date    CR 1.28 02/06/2017     Component Value Flag Ref Range Units Status Collected Lab   Creatinine Urine 65   mg/dL Final 03/21/2016 12:40 PM [facility]   Albumin Urine mg/L 13   mg/L Final 03/21/2016 12:40 PM [facility]b   Albumin Urine mg/g Cr 20.56 (H) 0 - 17 mg/g Cr Final 03/21/2016 12:40 PM [facility]Lb     Hemoglobin A1C 6.3 (H) 4.3 - 6.0 % Final 02/06/2017 12:28 PM 1740         Routing refill request to provider for review/approval because:  Route due to >creat per protocol

## 2017-04-03 RX ORDER — NATEGLINIDE 120 MG/1
TABLET ORAL
Qty: 90 TABLET | Refills: 11 | Status: ON HOLD | OUTPATIENT
Start: 2017-04-03 | End: 2017-08-30

## 2017-04-04 ENCOUNTER — OFFICE VISIT (OUTPATIENT)
Dept: PODIATRY | Facility: CLINIC | Age: 72
End: 2017-04-04
Payer: MEDICARE

## 2017-04-04 VITALS — SYSTOLIC BLOOD PRESSURE: 119 MMHG | OXYGEN SATURATION: 99 % | HEART RATE: 98 BPM | DIASTOLIC BLOOD PRESSURE: 73 MMHG

## 2017-04-04 DIAGNOSIS — I87.8 VENOUS STASIS: ICD-10-CM

## 2017-04-04 DIAGNOSIS — L97.912 ULCER OF RIGHT LOWER LEG, WITH FAT LAYER EXPOSED (H): Primary | ICD-10-CM

## 2017-04-04 PROCEDURE — 99212 OFFICE O/P EST SF 10 MIN: CPT | Performed by: PODIATRIST

## 2017-04-04 NOTE — NURSING NOTE
"Amos Walker's goals for this visit include: Right leg recheck   He requests these members of his care team be copied on today's visit information: yes    PCP: Silvina Patiño    Referring Provider:  ESTABLISHED PATIENT  No address on file    Chief Complaint   Patient presents with     RECHECK     Recheck right leg       Initial /73  Pulse 98  SpO2 99% Estimated body mass index is 23.67 kg/(m^2) as calculated from the following:    Height as of 6/2/15: 1.892 m (6' 2.49\").    Weight as of 2/6/17: 84.7 kg (186 lb 12.8 oz).  Medication Reconciliation: complete    "
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
## 2017-04-04 NOTE — MR AVS SNAPSHOT
After Visit Summary   4/4/2017    Amos Walker    MRN: [id number]           Patient Information     Date Of Birth          1945        Visit Information        Provider Department      4/4/2017 9:30 AM Brayan Mcclain DPM [facility]        Today's Diagnoses     Ulcer of right lower leg, with fat layer exposed (H)    -  1    Venous stasis          Care Instructions    Thanks for coming today.  Ortho/Sports Medicine Clinic  [address] [place] [address]    To schedule future appointments in Ortho Clinic, you may call [phone].    To schedule ordered imaging by your Provider: Call [facility] at [phone]    Grabhouse available online at:   [email or web address]    Please call if any further questions or concerns [phone] and ask for the Orthopedic Department. Clinic hours 8 am to 5 pm.    Return to clinic if symptoms worsen.          Follow-ups after your visit        Your next 10 appointments already scheduled     Apr 18, 2017 10:30 AM CDT   Return Visit with Brayan Mcclain DPM   [facility] ([facility])    [address] [place] [id number] [phone]            Jun 14, 2017 10:30 AM CDT   RETURN RETINA with Jen Aranda MD   Eye Clinic ([facility])    Sony Chery [address] Clin [address]  [facility] [id number] [phone]              Who to contact     If you have questions or need follow up information about today's clinic visit or your schedule please contact [facility] directly at [phone].  Normal or non-critical lab and imaging results will be communicated to you by MyChart, letter or phone within 4 business days after the clinic has received the results. If you do not hear from us within 7 days, please contact the clinic through MyChart or phone. If you have a critical or abnormal lab result, we will notify  you by phone as soon as possible.  Submit refill requests through shopkick or call your pharmacy and they will forward the refill request to us. Please allow 3 business days for your refill to be completed.          Additional Information About Your Visit        shopkick Information     shopkick gives you secure access to your electronic health record. If you see a primary care provider, you can also send messages to your care team and make appointments. If you have questions, please call your primary care clinic.  If you do not have a primary care provider, please call [phone] and they will assist you.      shopkick is an electronic gateway that provides easy, online access to your medical records. With shopkick, you can request a clinic appointment, read your test results, renew a prescription or communicate with your care team.     To access your existing account, please contact your [facility] or call [phone] for assistance.        Care EveryWhere ID     This is your Care EveryWhere ID. This could be used by other organizations to access your [place] medical records  [id number]        Your Vitals Were     Pulse Pulse Oximetry                98 99%           Blood Pressure from Last 3 Encounters:   04/04/17 119/73   03/28/17 126/64   03/14/17 130/73    Weight from Last 3 Encounters:   02/06/17 84.7 kg (186 lb 12.8 oz)   10/31/16 87.8 kg (193 lb 8 oz)   06/27/16 85 kg (187 lb 6.4 oz)              Today, you had the following     No orders found for display       Primary Care Provider Office Phone # Fax #    Silvina Patiño [phone]       [address] [address]        Thank you!     Thank you for choosing [facility]  for your care. Our goal is always to provide you with excellent care. Hearing back from our patients is one way we can continue to improve our services. Please take a few minutes to  complete the written survey that you may receive in the mail after your visit with us. Thank you!             Your Updated Medication List - Protect others around you: Learn how to safely use, store and throw away your medicines at www.disposemymeds.org.          This list is accurate as of: 4/4/17  3:30 PM.  Always use your most recent med list.                   Brand Name Dispense Instructions for use    * ammonium lactate 12 % cream    AMLACTIN    300 g    Apply twice daily to legs       * ammonium lactate 12 % cream    LAC-HYDRIN    385 g    Apply topically 2 times daily as needed for dry skin       ascorbic acid 1000 MG Tabs    vitamin C     Take 1,000 mg by mouth daily       aspirin 81 MG chewable tablet      Take 81 mg by mouth daily       blood glucose monitoring lancets     3 Box    Use to test blood sugars 2 as directed.       blood glucose monitoring test strip    ONE TOUCH ULTRA    60 strip    Use to test blood sugars 2 times daily or as directed.       ciprofloxacin-dexamethasone otic suspension    CIPRODEX    1 Bottle    Place 4 drops into both ears 2 times daily       clopidogrel 75 MG tablet    PLAVIX    30 tablet    Take 1 tablet (75 mg) by mouth daily       * econazole nitrate 1 % cream     85 g    Apply topically 2 times daily       * econazole nitrate 1 % cream     85 g    Apply topically 2 times daily       ferrous sulfate 325 (65 FE) MG tablet    IRON    60 tablet    Take 1 tablet (325 mg) by mouth 2 times daily       gentamicin 0.1 % ointment    GARAMYCIN    30 g    Apply topically daily To right leg ulcer daily.       hydrochlorothiazide 12.5 MG Tabs tablet     90 tablet    Take 1 tablet (12.5 mg) by mouth daily       hydrocortisone 0.2 % cream    WESTCORT    15 g    Apply sparingly to affected area twice times daily for 14 days.       insulin glargine 100 UNIT/ML injection    LANTUS SOLOSTAR    9 mL    Inject 25 Units Subcutaneous At Bedtime       insulin pen needle 31G X 8 MM    B-D U/F     "100 each    Use 1 daily or as directed       * ketoconazole 2 % cream    NIZORAL    60 g    Apply topically 2 times daily       * ketoconazole 2 % cream    NIZORAL    60 g    Apply topically 2 times daily To toenails.       levofloxacin 750 MG tablet    LEVAQUIN    14 tablet    Take 1 tablet (750 mg) by mouth daily       lisinopril 40 MG tablet    PRINIVIL/ZESTRIL    90 tablet    Take 1 tablet (40 mg) by mouth daily       mupirocin 2 % ointment    BACTROBAN    22 g    Apply topically daily       nateglinide 120 MG tablet    STARLIX    90 tablet    TAKE 1 TABLET BY MOUTH THREE TIMES DAILY BEFORE MEALS       nystatin Powd     60 g    Apply twice daily to feet       nystatin-triamcinolone cream    MYCOLOG II    60 g    Apply topically 2 times daily       OPTIFOAM 6\"X6\" Pads     1 each    1 Box once a week       * order for DME     2 each    Please measure and distribute 1 pair of 20mm Hg - 30mm Hg knee high ULCER CARE open or closed toe compression stockings.  Patient has a size 13 foot and please take this into consideration.  Jobst or equivalent       * order for DME     3 each    Please measure and distribute 1 pair of 20mmHg - 30mmHg knee high open or closed toe compression stockings. Jobst ultrasheer or equivalent.       oxyCODONE 5 MG IR tablet    ROXICODONE    20 tablet    Take 1 tablet (5 mg) by mouth every 4 hours as needed for moderate to severe pain       sildenafil 50 MG cap/tab    REVATIO/VIAGRA    10 tablet    Take 1 tablet (50 mg) by mouth daily as needed for erectile dysfunction       simvastatin 10 MG tablet    ZOCOR    90 tablet    Take 1 tablet (10 mg) by mouth At Bedtime       sitagliptin 100 MG tablet    JANUVIA    90 tablet    Take 1 tablet (100 mg) by mouth daily       SSD 1 % cream   Generic drug:  silver sulfADIAZINE          triamcinolone 0.1 % cream    KENALOG    453.6 g    Apply sparingly daily to affected areas on feet and legs.       VITAMIN D3 PO      Take by mouth daily       * Notice:  " This list has 8 medication(s) that are the same as other medications prescribed for you. Read the directions carefully, and ask your doctor or other care provider to review them with you.

## 2017-04-06 DIAGNOSIS — I73.9 PERIPHERAL VASCULAR DISEASE, UNSPECIFIED (H): ICD-10-CM

## 2017-04-06 RX ORDER — CLOPIDOGREL BISULFATE 75 MG/1
75 TABLET ORAL DAILY
Qty: 30 TABLET | Refills: 11 | Status: ON HOLD | OUTPATIENT
Start: 2017-04-06 | End: 2017-08-30

## 2017-04-06 NOTE — TELEPHONE ENCOUNTER
clopidogrel (PLAVIX) 75 MG tablet  Last Written Prescription Date:  3/15/16  Last Fill Quantity: 30,   # refills: 11  Last Office Visit with [facility] or [facility] prescribing provider: 2/6/17  Future Office visit:  None    Lab Results   Component Value Date    WBC 4.8 11/18/2014     Lab Results   Component Value Date    RBC 3.63 11/18/2014     Lab Results   Component Value Date    HGB 11.7 11/18/2014     Lab Results   Component Value Date    HCT 34.8 11/18/2014     No components found for: MCT  Lab Results   Component Value Date    MCV 96 11/18/2014     Lab Results   Component Value Date    MCH 32.2 11/18/2014     Lab Results   Component Value Date    MCHC 33.6 11/18/2014     Lab Results   Component Value Date    RDW 13.0 11/18/2014     Lab Results   Component Value Date     11/18/2014     Hemoglobin   Date Value Ref Range Status   11/18/2014 11.7 (L) 13.3 - 17.7 g/dL Final   ]  Creatinine   Date Value Ref Range Status   02/06/2017 1.28 (H) 0.66 - 1.25 mg/dL Final   ]        Routing refill request to provider for review/approval because:   clopidogrel (PLAVIX) 75 MG tablet   Cbc,hgb past due

## 2017-04-18 ENCOUNTER — TELEPHONE (OUTPATIENT)
Dept: INTERVENTIONAL RADIOLOGY/VASCULAR | Facility: CLINIC | Age: 72
End: 2017-04-18

## 2017-04-18 ENCOUNTER — OFFICE VISIT (OUTPATIENT)
Dept: PODIATRY | Facility: CLINIC | Age: 72
End: 2017-04-18
Payer: MEDICARE

## 2017-04-18 VITALS — DIASTOLIC BLOOD PRESSURE: 60 MMHG | SYSTOLIC BLOOD PRESSURE: 122 MMHG

## 2017-04-18 DIAGNOSIS — I83.893 VARICOSE VEINS OF BILATERAL LOWER EXTREMITIES WITH OTHER COMPLICATIONS: Primary | ICD-10-CM

## 2017-04-18 DIAGNOSIS — L97.912 ULCER OF RIGHT LOWER LEG, WITH FAT LAYER EXPOSED (H): ICD-10-CM

## 2017-04-18 DIAGNOSIS — E11.40 TYPE 2 DIABETES, CONTROLLED, WITH NEUROPATHY (H): Primary | ICD-10-CM

## 2017-04-18 DIAGNOSIS — I87.8 VENOUS STASIS: ICD-10-CM

## 2017-04-18 PROCEDURE — 99213 OFFICE O/P EST LOW 20 MIN: CPT | Performed by: PODIATRIST

## 2017-04-18 RX ORDER — LEVOFLOXACIN 750 MG/1
750 TABLET, FILM COATED ORAL DAILY
Qty: 14 TABLET | Refills: 0 | Status: ON HOLD | OUTPATIENT
Start: 2017-04-18 | End: 2017-08-25

## 2017-04-18 NOTE — PATIENT INSTRUCTIONS
Thanks for coming today.  Ortho/Sports Medicine Clinic  [address] [place] [address]    To schedule future appointments in Ortho Clinic, you may call [phone].    To schedule ordered imaging by your provider:   Call Central [facility] [phone]    To schedule an injection ordered by your provider:  Call Central Imaging Injection scheduling line: [phone]  Altacorhart available online at:  [email or web address]    Please call if any further questions or concerns ([phone]).  Clinic hours 8 am to 5 pm.    Return to clinic (call) if symptoms worsen or fail to improve.

## 2017-04-18 NOTE — TELEPHONE ENCOUNTER
"Patient returned my call.  He states Dr. Chappell would like him to come back to see Dr. Jenkins.  He has a right lower shin non healing ulcer that he has had since Xmas no other issues with his legs other than some \"pimple\" type areas on his toes he gets 1x per month, that last a week that he puts cream on.  Dr. Jenkins recommends a right lower extremity venous competency exam.  I have pt scheduled and he confirmed as follows:  Thursday, 4/27/17 US @ 7:00 am Dr. Alice MINA clinic visit @ 8:40 am.    HENRIETTA Dorantes, RN, BSN  Interventional Radiology Care Coordinator   Phone:  [phone]    "

## 2017-04-18 NOTE — TELEPHONE ENCOUNTER
I called because I received a message to reach out to patient of Dr. Montana for clinic visit.  I called and left a message including my reason for call and call back information.  I will ask patient about his current venous insufficiency issues/symptoms, get him scheduled for current imaging if necessary and a clinic visit with Dr. Jenkins.  HENRIETTA Dorantes RN, BSN  Interventional Radiology Care Coordinator   Phone:  [phone]

## 2017-04-18 NOTE — MR AVS SNAPSHOT
After Visit Summary   2017    Amos Walker    MRN: [id number]           Patient Information     Date Of Birth          1945        Visit Information        Provider Department      2017 10:30 AM Brayan Mcclain DPM [facility]        Today's Diagnoses     Type 2 diabetes, controlled, with neuropathy (H)    -  1    Ulcer of right lower leg, with fat layer exposed (H)        Venous stasis          Care Instructions    Thanks for coming today.  [facility]  [address] [place] [address]    To schedule future appointments in Ortho Clinic, you may call [phone].    To schedule ordered imaging by your provider:   Call Central Imaging [phone]    To schedule an injection ordered by your provider:  Call Central Imaging Injection scheduling line: [phone]  RateSetterhart available online at:  [email or web address]    Please call if any further questions or concerns ([phone]).  Clinic hours 8 am to 5 pm.    Return to clinic (call) if symptoms worsen or fail to improve.          Follow-ups after your visit        Your next 10 appointments already scheduled     2017  7:00 AM CDT   US LOWER EXTREMITY VENOUS COMPETENCY RIGHT with UCUSV1   [facility] ([facility])    [address] [place] [address] [phone]           Please bring a list of your medicines (including vitamins, minerals and over-the-counter drugs). Also, tell your doctor about any allergies you may have. Wear comfortable clothes and leave your valuables at home.  You do not need to do anything special to prepare for your exam.  Please call the Imaging Department at your exam site with any questions.            2017  8:40 AM CDT   (Arrive by 8:25 AM)   Return Vascular Visit with Lane Jenkins MD   [facility] ([facility])    [address]  [address] [facility] [address]   [phone]            May 02, 2017  9:15 AM CDT   Return Visit with Brayan Mcclain DPM   [facility] ([facility])    [address] [place] [id number] [phone]            Jun 14, 2017 10:30 AM CDT   RETURN RETINA with Jen Aranda MD   [facility] ([facility])    [address]  [facility] [address]   [phone]              Future tests that were ordered for you today     Open Future Orders        Priority Expected Expires Ordered    US Venous Competency Right Routine  4/18/2018 4/18/2017            Who to contact     If you have questions or need follow up information about today's clinic visit or your schedule please contact [facility] directly at [phone].  Normal or non-critical lab and imaging results will be communicated to you by Beestarhart, letter or phone within 4 business days after the clinic has received the results. If you do not hear from us within 7 days, please contact the clinic through PayPayt or phone. If you have a critical or abnormal lab result, we will notify you by phone as soon as possible.  Submit refill requests through CardioFocus or call your pharmacy and they will forward the refill request to us. Please allow 3 business days for your refill to be completed.          Additional Information About Your Visit        BeestarharSimpleReach Information     CardioFocus gives you secure access to your electronic health record. If you see a primary care provider, you can also send messages to your care team and make appointments. If you have questions, please call your primary care clinic.  If you do not have a primary care provider, please call [phone] and they will assist you.      CardioFocus is an electronic gateway that provides easy, online access to your medical records. With CardioFocus, you can request a  clinic appointment, read your test results, renew a prescription or communicate with your care team.     To access your existing account, please contact your [facility] or call [phone] for assistance.        Care EveryWhere ID     This is your Care EveryWhere ID. This could be used by other organizations to access your [facility] medical records  [id number]         Blood Pressure from Last 3 Encounters:   04/18/17 122/60   04/04/17 119/73   03/28/17 126/64    Weight from Last 3 Encounters:   02/06/17 84.7 kg (186 lb 12.8 oz)   10/31/16 87.8 kg (193 lb 8 oz)   06/27/16 85 kg (187 lb 6.4 oz)              Today, you had the following     No orders found for display         Today's Medication Changes          These changes are accurate as of: 4/18/17 11:59 PM.  If you have any questions, ask your nurse or doctor.               Start taking these medicines.        Dose/Directions    sodium chloride 0.9 % Soln   Commonly known as:  SALINE WOUND WASH   Used for:  Ulcer of right lower leg, with fat layer exposed (H), Venous stasis, Type 2 diabetes, controlled, with neuropathy (H)   Started by:  Brayan Mcclain DPM        Dose:  1 dose *   Externally apply 1 dose * topically daily for 7 days   Quantity:  500 mL   Refills:  4            Where to get your medicines      These medications were sent to [facility] [address] [place] [address] [address]VE [address] AT [place] [address]  [facility], [facility] [address]     Phone:  [phone]     levofloxacin 750 MG tablet    sodium chloride 0.9 % Soln                Primary Care Provider Office Phone # Fax #    Silvina Patiño [phone]       [address] [address]        Thank you!     Thank you for choosing [facility]  for your care. Our goal is always to provide you with excellent care. Hearing back from our patients is one way we can continue  to improve our services. Please take a few minutes to complete the written survey that you may receive in the mail after your visit with us. Thank you!             Your Updated Medication List - Protect others around you: Learn how to safely use, store and throw away your medicines at www.disposemymeds.org.          This list is accurate as of: 4/18/17 11:59 PM.  Always use your most recent med list.                   Brand Name Dispense Instructions for use    * ammonium lactate 12 % cream    AMLACTIN    300 g    Apply twice daily to legs       * ammonium lactate 12 % cream    LAC-HYDRIN    385 g    Apply topically 2 times daily as needed for dry skin       ascorbic acid 1000 MG Tabs    vitamin C     Take 1,000 mg by mouth daily       aspirin 81 MG chewable tablet      Take 81 mg by mouth daily       blood glucose monitoring lancets     3 Box    Use to test blood sugars 2 as directed.       blood glucose monitoring test strip    ONE TOUCH ULTRA    60 strip    Use to test blood sugars 2 times daily or as directed.       ciprofloxacin-dexamethasone otic suspension    CIPRODEX    1 Bottle    Place 4 drops into both ears 2 times daily       clopidogrel 75 MG tablet    PLAVIX    30 tablet    Take 1 tablet (75 mg) by mouth daily       * econazole nitrate 1 % cream     85 g    Apply topically 2 times daily       * econazole nitrate 1 % cream     85 g    Apply topically 2 times daily       ferrous sulfate 325 (65 FE) MG tablet    IRON    60 tablet    Take 1 tablet (325 mg) by mouth 2 times daily       gentamicin 0.1 % ointment    GARAMYCIN    30 g    Apply topically daily To right leg ulcer daily.       hydrochlorothiazide 12.5 MG Tabs tablet     90 tablet    Take 1 tablet (12.5 mg) by mouth daily       hydrocortisone 0.2 % cream    WESTCORT    15 g    Apply sparingly to affected area twice times daily for 14 days.       insulin glargine 100 UNIT/ML injection    LANTUS SOLOSTAR    9 mL    Inject 25 Units Subcutaneous At  "Bedtime       insulin pen needle 31G X 8 MM    B-D U/F    100 each    Use 1 daily or as directed       * ketoconazole 2 % cream    NIZORAL    60 g    Apply topically 2 times daily       * ketoconazole 2 % cream    NIZORAL    60 g    Apply topically 2 times daily To toenails.       levofloxacin 750 MG tablet    LEVAQUIN    14 tablet    Take 1 tablet (750 mg) by mouth daily       lisinopril 40 MG tablet    PRINIVIL/ZESTRIL    90 tablet    Take 1 tablet (40 mg) by mouth daily       mupirocin 2 % ointment    BACTROBAN    22 g    Apply topically daily       nateglinide 120 MG tablet    STARLIX    90 tablet    TAKE 1 TABLET BY MOUTH THREE TIMES DAILY BEFORE MEALS       nystatin Powd     60 g    Apply twice daily to feet       nystatin-triamcinolone cream    MYCOLOG II    60 g    Apply topically 2 times daily       OPTIFOAM 6\"X6\" Pads     1 each    1 Box once a week       * order for DME     2 each    Please measure and distribute 1 pair of 20mm Hg - 30mm Hg knee high ULCER CARE open or closed toe compression stockings.  Patient has a size 13 foot and please take this into consideration.  Jobst or equivalent       * order for DME     3 each    Please measure and distribute 1 pair of 20mmHg - 30mmHg knee high open or closed toe compression stockings. Jobst ultrasheer or equivalent.       oxyCODONE 5 MG IR tablet    ROXICODONE    20 tablet    Take 1 tablet (5 mg) by mouth every 4 hours as needed for moderate to severe pain       sildenafil 50 MG cap/tab    REVATIO/VIAGRA    10 tablet    Take 1 tablet (50 mg) by mouth daily as needed for erectile dysfunction       simvastatin 10 MG tablet    ZOCOR    90 tablet    Take 1 tablet (10 mg) by mouth At Bedtime       sitagliptin 100 MG tablet    JANUVIA    90 tablet    Take 1 tablet (100 mg) by mouth daily       sodium chloride 0.9 % Soln    SALINE WOUND WASH    500 mL    Externally apply 1 dose * topically daily for 7 days       SSD 1 % cream   Generic drug:  silver sulfADIAZINE    "       triamcinolone 0.1 % cream    KENALOG    453.6 g    Apply sparingly daily to affected areas on feet and legs.       VITAMIN D3 PO      Take by mouth daily       * Notice:  This list has 8 medication(s) that are the same as other medications prescribed for you. Read the directions carefully, and ask your doctor or other care provider to review them with you.

## 2017-04-18 NOTE — NURSING NOTE
"Amos Walker's goals for this visit include: Recheck right leg ulcer.   He requests these members of his care team be copied on today's visit information: yes    PCP: Silvina Patiño    Referring Provider:  No referring provider defined for this encounter.    Chief Complaint   Patient presents with     RECHECK     Recheck right leg ulcer       Initial /60 Estimated body mass index is 23.67 kg/(m^2) as calculated from the following:    Height as of 6/2/15: 1.892 m (6' 2.49\").    Weight as of 2/6/17: 84.7 kg (186 lb 12.8 oz).  Medication Reconciliation: complete    "
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
## 2017-04-27 ENCOUNTER — OFFICE VISIT (OUTPATIENT)
Dept: RADIOLOGY | Facility: CLINIC | Age: 72
End: 2017-04-27

## 2017-04-27 VITALS
DIASTOLIC BLOOD PRESSURE: 65 MMHG | SYSTOLIC BLOOD PRESSURE: 122 MMHG | HEART RATE: 103 BPM | RESPIRATION RATE: 16 BRPM | OXYGEN SATURATION: 99 %

## 2017-04-27 DIAGNOSIS — I70.213 ATHEROSCLEROSIS OF NATIVE ARTERY OF BOTH LOWER EXTREMITIES WITH INTERMITTENT CLAUDICATION (H): Primary | ICD-10-CM

## 2017-04-27 DIAGNOSIS — I83.893 VARICOSE VEINS OF BILATERAL LOWER EXTREMITIES WITH OTHER COMPLICATIONS: ICD-10-CM

## 2017-04-27 ASSESSMENT — PAIN SCALES - GENERAL: PAINLEVEL: NO PAIN (0)

## 2017-04-27 NOTE — NURSING NOTE
Chief Complaint   Patient presents with     RECHECK     Follow up right shin wound        Vitals:    04/27/17 0833   BP: 122/65   BP Location: Left arm   Pulse: 103   Resp: 16   SpO2: 99%       There is no height or weight on file to calculate BMI.               Trish Fernandez LPN
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
## 2017-04-28 ENCOUNTER — TELEPHONE (OUTPATIENT)
Dept: INTERVENTIONAL RADIOLOGY/VASCULAR | Facility: CLINIC | Age: 72
End: 2017-04-28

## 2017-04-28 ENCOUNTER — MYC MEDICAL ADVICE (OUTPATIENT)
Dept: RADIOLOGY | Facility: CLINIC | Age: 72
End: 2017-04-28

## 2017-04-28 DIAGNOSIS — I83.893 VARICOSE VEINS OF BILATERAL LOWER EXTREMITIES WITH OTHER COMPLICATIONS: Primary | ICD-10-CM

## 2017-04-28 NOTE — TELEPHONE ENCOUNTER
I called and spoke with patient after having Dr. Jenkins review is arterial imaging done 4/27/17. Per Dr. Jenkins he would not treat Amos's arterial disease at this time.  He would offer to treat his  veins with sclerotherapy as discussed in clinic consult yesterday.  I have submitted pt for PA.  I have also emailed patient the prescription for Ulcercare open toed compression stocking as requested by patient.  Plan is to call pt to schedule when PA obtained.  HENRIETTA Dorantes RN, BSN  Interventional Radiology Care Coordinator   Phone:  [phone]
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
## 2017-05-01 ENCOUNTER — MYC MEDICAL ADVICE (OUTPATIENT)
Dept: INTERVENTIONAL RADIOLOGY/VASCULAR | Facility: CLINIC | Age: 72
End: 2017-05-01

## 2017-05-01 DIAGNOSIS — I83.893 VARICOSE VEINS OF BILATERAL LOWER EXTREMITIES WITH OTHER COMPLICATIONS: Primary | ICD-10-CM

## 2017-05-02 ENCOUNTER — OFFICE VISIT (OUTPATIENT)
Dept: PODIATRY | Facility: CLINIC | Age: 72
End: 2017-05-02
Payer: MEDICARE

## 2017-05-02 VITALS — HEART RATE: 97 BPM | DIASTOLIC BLOOD PRESSURE: 69 MMHG | OXYGEN SATURATION: 99 % | SYSTOLIC BLOOD PRESSURE: 142 MMHG

## 2017-05-02 DIAGNOSIS — I87.8 VENOUS STASIS: ICD-10-CM

## 2017-05-02 DIAGNOSIS — E11.51 DIABETES MELLITUS WITH PERIPHERAL VASCULAR DISEASE (H): ICD-10-CM

## 2017-05-02 DIAGNOSIS — L97.912 ULCER OF LEG, CHRONIC, RIGHT, WITH FAT LAYER EXPOSED (H): Primary | ICD-10-CM

## 2017-05-02 PROCEDURE — 99212 OFFICE O/P EST SF 10 MIN: CPT | Performed by: PODIATRIST

## 2017-05-02 NOTE — NURSING NOTE
"Amos Walker's goals for this visit include: Right leg recheck   He requests these members of his care team be copied on today's visit information: yes    PCP: Silvina Patiño    Referring Provider:  ESTABLISHED PATIENT  No address on file    Chief Complaint   Patient presents with     RECHECK     Right leg recheck        Initial /69  Pulse 97  SpO2 99% Estimated body mass index is 23.67 kg/(m^2) as calculated from the following:    Height as of 6/2/15: 1.892 m (6' 2.49\").    Weight as of 2/6/17: 84.7 kg (186 lb 12.8 oz).  Medication Reconciliation: complete    "

## 2017-05-02 NOTE — MR AVS SNAPSHOT
After Visit Summary   5/2/2017    Amos Walker    MRN: [id number]           Patient Information     Date Of Birth          1945        Visit Information        Provider Department      5/2/2017 9:15 AM Brayan Mcclain DPM [facility]        Today's Diagnoses     Ulcer of leg, chronic, right, with fat layer exposed (H)    -  1    Venous stasis        Diabetes mellitus with peripheral vascular disease (H)          Care Instructions    Thanks for coming today.  Ortho/Sports Medicine Clinic  [address] [place] [address]    To schedule future appointments in Ortho Clinic, you may call [phone].    To schedule ordered imaging by your Provider: Call [facility] at [phone]    [employer] available online at:   [employer]    Please call if any further questions or concerns [phone] and ask for the Orthopedic Department. Clinic hours 8 am to 5 pm.    Return to clinic if symptoms worsen.          Follow-ups after your visit        Your next 10 appointments already scheduled     May 03, 2017  1:00 PM CDT   IR VEIN SPIDER NON FACE SCLEROTHERAPY with UUIR3   [facility] [place], Interventional Radiology ([facility], [facility])    [address] [address] [phone]           1. Your doctor will need to do a history and physical within 30 days before this procedure. 2. Your doctor will determine whether you need a 12 lead EKG. 3. Laboratory tests are to be obtained by your doctor prior to the exam (creatinine, hepatic panel, Hgb/Hct, platelet count, and INR) 4. Bring a list of all drugs you are taking; include supplements and over-the-counter medications. 5. Wear comfortable clothes and leave all valuables at home. 6. If you have allergies to x-ray contrast or iodine, contact your doctor or a Radiology nurse prior to the exam day for instructions. 7. If you are or may be pregnant,  contact your doctor or a Radiology nurse prior to the day of the exam. 8. If you have diabetes, check with your doctor or a Radiology nurse to see if your insulin needs to be adjusted for the exam. 9. If you are taking Coumadin (to thin your blood) please contact your doctor or a Radiology nurse at least 5 days before the exam for special instructions. 10. You should not have received contrast within 48 hours of this exam. 11. The day before your exam you may eat your regular diet and are encouraged to drink at least 2 quarts of clear liquids. Drink no alcoholic beverages for 24 hours prior to the exam. 12. If you have a colostomy you will need to irrigate it with tap water at 8PM the evening before and again at 6AM the morning of the exam. 13. Do not smoke for 24 hours prior to the procedure. 14. Do not eat any solid food or milk products for 6 hours prior to the exam. You may drink clear liquids until 2 hours prior to the exam. Clear liquids include the following: water, Jell-O, clear broth, apple juice or any noncarbonated drink that you can see through (no pop!) 15. The morning of the exam you may brush your teeth and take medications as directed with a sip of water. 16. Tell the Radiology nurse if you have any allergies. 17. You will be asked to empty your bladder before the exam begins. 18. Following the exam you will need to remain on complete bedrest for 4-6 hours. The nurse will monitor your vital signs, puncture site, and the pulses and temperature of the leg that was punctured.            May 09, 2017  9:45 AM CDT   [facility] B[facility]an [facility]clain DPM   [facility]    [address] [place] [address] [phone]            May 19, 2017  9:25 AM CDT   (Arrive by 9:10 AM)   Return Visit with Racheal Swift MD   [facility] ([facility])    [address] [place] [address]  [address]   [phone]            Jun 14, 2017 10:30 AM CDT   RETURN RETINA with Jen Aranda MD   [facility] ([facility])    [address]  [address]  [address]  [facility] [address]   [phone]              Who to contact     If you have questions or need follow up information about today's clinic visit or your schedule please contact [facility] directly at [phone].  Normal or non-critical lab and imaging results will be communicated to you by [employer], letter or phone within 4 business days after the clinic has received the results. If you do not hear from us within 7 days, please contact the clinic through [employer] or phone. If you have a critical or abnormal lab result, we will notify you by phone as soon as possible.  Submit refill requests through [employer] or call your pharmacy and they will forward the refill request to us. Please allow 3 business days for your refill to be completed.          Additional Information About Your Visit        [employer] Information     [employer] gives you secure access to your electronic health record. If you see a primary care provider, you can also send messages to your care team and make appointments. If you have questions, please call your primary care clinic.  If you do not have a primary care provider, please call [phone] and they will assist you.      [employer] is an electronic gateway that provides easy, online access to your medical records. With [employer], you can request a clinic appointment, read your test results, renew a prescription or communicate with your care team.     To access your existing account, please contact your [facility] or call [phone] for assistance.        Care EveryWhere ID     This is your Care EveryWhere ID. This could be used by other organizations to access your [facility] medical records  [id number]        Your Vitals Were     Pulse  Pulse Oximetry                97 99%           Blood Pressure from Last 3 Encounters:   05/02/17 142/69   04/27/17 122/65   04/18/17 122/60    Weight from Last 3 Encounters:   02/06/17 84.7 kg (186 lb 12.8 oz)   10/31/16 87.8 kg (193 lb 8 oz)   06/27/16 85 kg (187 lb 6.4 oz)              Today, you had the following     No orders found for display       Primary Care Provider Office Phone # Fax #    Silvina Patiño [phone]       [facility] [address] [address]        Thank you!     Thank you for choosing [facility]  for your care. Our goal is always to provide you with excellent care. Hearing back from our patients is one way we can continue to improve our services. Please take a few minutes to complete the written survey that you may receive in the mail after your visit with us. Thank you!             Your Updated Medication List - Protect others around you: Learn how to safely use, store and throw away your medicines at www.disposemymeds.org.          This list is accurate as of: 5/2/17  1:58 PM.  Always use your most recent med list.                   Brand Name Dispense Instructions for use    * ammonium lactate 12 % cream    AMLACTIN    300 g    Apply twice daily to legs       * ammonium lactate 12 % cream    LAC-HYDRIN    385 g    Apply topically 2 times daily as needed for dry skin       ascorbic acid 1000 MG Tabs    vitamin C     Take 1,000 mg by mouth daily       aspirin 81 MG chewable tablet      Take 81 mg by mouth daily       blood glucose monitoring lancets     3 Box    Use to test blood sugars 2 as directed.       blood glucose monitoring test strip    ONE TOUCH ULTRA    60 strip    Use to test blood sugars 2 times daily or as directed.       ciprofloxacin-dexamethasone otic suspension    CIPRODEX    1 Bottle    Place 4 drops into both ears 2 times daily       clopidogrel 75 MG tablet    PLAVIX    30 tablet    Take 1 tablet (75 mg)  "by mouth daily       * econazole nitrate 1 % cream     85 g    Apply topically 2 times daily       * econazole nitrate 1 % cream     85 g    Apply topically 2 times daily       ferrous sulfate 325 (65 FE) MG tablet    IRON    60 tablet    Take 1 tablet (325 mg) by mouth 2 times daily       gentamicin 0.1 % ointment    GARAMYCIN    30 g    Apply topically daily To right leg ulcer daily.       hydrochlorothiazide 12.5 MG Tabs tablet     90 tablet    Take 1 tablet (12.5 mg) by mouth daily       hydrocortisone 0.2 % cream    WESTCORT    15 g    Apply sparingly to affected area twice times daily for 14 days.       insulin glargine 100 UNIT/ML injection    LANTUS SOLOSTAR    9 mL    Inject 25 Units Subcutaneous At Bedtime       insulin pen needle 31G X 8 MM    B-D U/F    100 each    Use 1 daily or as directed       * ketoconazole 2 % cream    NIZORAL    60 g    Apply topically 2 times daily       * ketoconazole 2 % cream    NIZORAL    60 g    Apply topically 2 times daily To toenails.       levofloxacin 750 MG tablet    LEVAQUIN    14 tablet    Take 1 tablet (750 mg) by mouth daily       lisinopril 40 MG tablet    PRINIVIL/ZESTRIL    90 tablet    Take 1 tablet (40 mg) by mouth daily       mupirocin 2 % ointment    BACTROBAN    22 g    Apply topically daily       nateglinide 120 MG tablet    STARLIX    90 tablet    TAKE 1 TABLET BY MOUTH THREE TIMES DAILY BEFORE MEALS       nystatin Powd     60 g    Apply twice daily to feet       nystatin-triamcinolone cream    MYCOLOG II    60 g    Apply topically 2 times daily       OPTIFOAM 6\"X6\" Pads     1 each    1 Box once a week       * order for DME     2 each    Please measure and distribute 1 pair of 20mm Hg - 30mm Hg knee high ULCER CARE open or closed toe compression stockings.  Patient has a size 13 foot and please take this into consideration.  [employer] or equivalent       * order for DME     3 each    Please measure and distribute 1 pair of 20mmHg - 30mmHg knee high open or " closed toe compression stockings. Jobst ultrasheer or equivalent.       * order for DME     2 each    Please measure and distribute 1 pair of 30mmHg - 40mmHg knee high open toe ulcercare compression stockings. Jobst ultrasheer or equivalent.       oxyCODONE 5 MG IR tablet    ROXICODONE    20 tablet    Take 1 tablet (5 mg) by mouth every 4 hours as needed for moderate to severe pain       sildenafil 50 MG cap/tab    REVATIO/VIAGRA    10 tablet    Take 1 tablet (50 mg) by mouth daily as needed for erectile dysfunction       simvastatin 10 MG tablet    ZOCOR    90 tablet    Take 1 tablet (10 mg) by mouth At Bedtime       sitagliptin 100 MG tablet    JANUVIA    90 tablet    Take 1 tablet (100 mg) by mouth daily       SSD 1 % cream   Generic drug:  silver sulfADIAZINE          triamcinolone 0.1 % cream    KENALOG    453.6 g    Apply sparingly daily to affected areas on feet and legs.       VITAMIN D3 PO      Take by mouth daily       * Notice:  This list has 9 medication(s) that are the same as other medications prescribed for you. Read the directions carefully, and ask your doctor or other care provider to review them with you.
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
## 2017-05-02 NOTE — PATIENT INSTRUCTIONS
Thanks for coming today.  Ortho/Sports Medicine Clinic  [address] [place] [address]    To schedule future appointments in Ortho Clinic, you may call [phone].    To schedule ordered imaging by your Provider: Call [facility] at [phone]    [employer] available online at:   [email or web address]    Please call if any further questions or concerns [phone] and ask for the Orthopedic Department. Clinic hours 8 am to 5 pm.    Return to clinic if symptoms worsen.

## 2017-05-03 ENCOUNTER — APPOINTMENT (OUTPATIENT)
Dept: INTERVENTIONAL RADIOLOGY/VASCULAR | Facility: CLINIC | Age: 72
End: 2017-05-03
Attending: RADIOLOGY
Payer: MEDICARE

## 2017-05-03 ENCOUNTER — HOSPITAL ENCOUNTER (OUTPATIENT)
Facility: CLINIC | Age: 72
Discharge: HOME | End: 2017-05-03
Attending: RADIOLOGY | Admitting: RADIOLOGY
Payer: MEDICARE

## 2017-05-03 VITALS — OXYGEN SATURATION: 97 % | RESPIRATION RATE: 16 BRPM | HEART RATE: 90 BPM

## 2017-05-03 DIAGNOSIS — I83.893 VARICOSE VEINS OF BILATERAL LOWER EXTREMITIES WITH OTHER COMPLICATIONS: ICD-10-CM

## 2017-05-03 PROCEDURE — 36471 NJX SCLRSNT MLT INCMPTNT VN: CPT

## 2017-05-03 PROCEDURE — 27210732 ZZH ACCESSORY CR1

## 2017-05-03 PROCEDURE — 27210903 ZZH KIT CR5

## 2017-05-03 NOTE — PROGRESS NOTES
Interventional Radiology Brief Post Procedure Note    Procedure: @RISFRMTLINK([id number])@    Proceduralist: Lane Jenkins MD    Assistant: Alex P. Pallas, DO    Time Out: Prior to the start of the procedure and with procedural staff participation, I verbally confirmed the patient s identity using two indicators, relevant allergies, that the procedure was appropriate and matched the consent or emergent situation, and that the correct equipment/implants were available. Immediately prior to starting the procedure I conducted the Time Out with the procedural staff and re-confirmed the patient s name, procedure, and site/side. (The Joint Commission universal protocol was followed.)  Yes    Sedation: None.    Findings: Sclerotherapy incompetent RLE michael-wound veins. 2 punctures. 20mL Sotradecol total.    Estimated Blood Loss: Minimal    Fluoroscopy Time: Not applicable    SPECIMENS: None    Complications: 1. None     Condition: Stable    Plan: See dictation.    Comments: See dictated procedure note for full details.    Alex P. Pallas, DO

## 2017-05-03 NOTE — PROGRESS NOTES
Interventional Radiology Intra-procedural Nursing Note    Patient Name: Amos Walker  Medical Record Number: [id number]  Today's Date: May 3, 2017    Start Time: 1337       Pt arrived to IR-7 with Sanchez from [facility] non-accompanied. Pt given gown to change into. Prepped and draped per policy by Sanchez RTR. Wound to RLE covered with dressing. Pt states concerns over wound. Dr. Jenkins notified.   and Dr. Pallas to IR-7 at 1345    Taylor Mendieta RN,BSN

## 2017-05-04 NOTE — TELEPHONE ENCOUNTER
I called and spoke with patient after his sclerotherapy treatment done on 5/3/17 with Dr. Jenkins.  Per patient he is doing well, he had some slight pain last night, but got up and walked around and it felt better.  Per pt's preference he is scheduled for 3 week sclero check on 5/25/17 @ 2pm at the [facility].  Pt confirms this.  He will check in 15 to 30 minutes early.  HENRIETTA Dorantes RN, BSN  Interventional Radiology Care Coordinator   Phone:  [phone]

## 2017-05-05 ENCOUNTER — TELEPHONE (OUTPATIENT)
Dept: PODIATRY | Facility: CLINIC | Age: 72
End: 2017-05-05

## 2017-05-05 NOTE — TELEPHONE ENCOUNTER
[facility] Call Center    Phone Message    Name of Caller: Amos    Phone Number: Cell number on file:    Telephone Information:   Mobile none       Best time to return call: any    May a detailed message be left on voicemail: yes    Relation to patient: Self    Reason for Call: Other: patient had to cancel appt next week.  needs to see ghanshyam.  can you pleae call him about reschecdule.  No available times next week, he is out of town tuesday.  [facility] of [facility]AMZAH whitneyay as well for pt.      Action Taken: Message routed to:  Adult Clinics: Podiatry p [id number]

## 2017-05-08 NOTE — TELEPHONE ENCOUNTER
Called and talked with patient, appointment rescheduled to Friday and Tuesday appointment cancelled  Ashley Ellison RN

## 2017-05-12 ENCOUNTER — OFFICE VISIT (OUTPATIENT)
Dept: PODIATRY | Facility: CLINIC | Age: 72
End: 2017-05-12
Payer: MEDICARE

## 2017-05-12 VITALS — DIASTOLIC BLOOD PRESSURE: 67 MMHG | SYSTOLIC BLOOD PRESSURE: 123 MMHG | OXYGEN SATURATION: 97 % | HEART RATE: 102 BPM

## 2017-05-12 DIAGNOSIS — I87.8 VENOUS STASIS: ICD-10-CM

## 2017-05-12 DIAGNOSIS — E11.40 DIABETIC NEUROPATHY WITH NEUROLOGIC COMPLICATION (H): ICD-10-CM

## 2017-05-12 DIAGNOSIS — L97.912 ULCER OF RIGHT LOWER LEG, WITH FAT LAYER EXPOSED (H): Primary | ICD-10-CM

## 2017-05-12 DIAGNOSIS — E11.49 DIABETIC NEUROPATHY WITH NEUROLOGIC COMPLICATION (H): ICD-10-CM

## 2017-05-12 PROCEDURE — 99213 OFFICE O/P EST LOW 20 MIN: CPT | Performed by: PODIATRIST
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
## 2017-05-12 NOTE — MR AVS SNAPSHOT
After Visit Summary   5/12/2017    Amos Walker    MRN: [id number]           Patient Information     Date Of Birth          1945        Visit Information        Provider Department      5/12/2017 12:30 PM Brayan Mcclain DPM [facility]        Today's Diagnoses     Ulcer of right lower leg, with fat layer exposed (H)    -  1    Venous stasis        Diabetic neuropathy with neurologic complication (H)          Care Instructions    Thanks for coming today.  [facility]  [address] [place] [address]    To schedule future appointments in Ortho Clinic, you may call [phone].    To schedule ordered imaging by your Provider: Call [facility] at [phone]    [email or web address] available online at:   [email or web address]    Please call if any further questions or concerns [phone] and ask for the Orthopedic Department. Clinic hours 8 am to 5 pm.    Return to clinic if symptoms worsen.          Follow-ups after your visit        Your next 10 appointments already scheduled     May 19, 2017  9:25 AM CDT   (Arrive by 9:10 AM)   Return Visit with Racheal Swift MD   [facility] ([facility])    [address] [place] [address]  4th Floor  [facility] [address] [phone]            May 25, 2017  2:30 PM CDT   (Arrive by 2:15 PM)   IR FOLLOW UP VISIT OUTPATIENT with [facility] NURSE   [facility] ([facility])    [address] [place] [address]  1st [facility] [id number] [phone]            May 26, 2017 10:30 AM CDT   [facility]    [address] [address] [id number] [phone]            Jun 14, 2017 10:30 AM CDT   RETURN RETINA with Jen Aranda MD   [facility] ([facility])    [place] [address]  9th  [address] Clin [address]  [facility] [address]   [phone]              Who to contact     If you have questions or need follow up information about today's clinic visit or your schedule please contact [facility] directly at [phone].  Normal or non-critical lab and imaging results will be communicated to you by MyChart, letter or phone within 4 business days after the clinic has received the results. If you do not hear from us within 7 days, please contact the clinic through ExpenseBothart or phone. If you have a critical or abnormal lab result, we will notify you by phone as soon as possible.  Submit refill requests through [email or web address] or call your pharmacy and they will forward the refill request to us. Please allow 3 business days for your refill to be completed.          Additional Information About Your Visit        [employer] Information     [email or web address] gives you secure access to your electronic health record. If you see a primary care provider, you can also send messages to your care team and make appointments. If you have questions, please call your primary care clinic.  If you do not have a primary care provider, please call [phone] and they will assist you.      [email or web address] is an electronic gateway that provides easy, online access to your medical records. With [email or web address], you can request a clinic appointment, read your test results, renew a prescription or communicate with your care team.     To access your existing account, please contact your [facility] or call [phone] for assistance.        Care EveryWhere ID     This is your Care EveryWhere ID. This could be used by other organizations to access your [place] medical records  [id number]        Your Vitals Were     Pulse Pulse Oximetry                102 97%           Blood Pressure from Last 3 Encounters:   05/12/17 123/67   05/02/17 142/69   04/27/17 122/65    Weight from Last 3 Encounters:   02/06/17 84.7 kg (186  lb 12.8 oz)   10/31/16 87.8 kg (193 lb 8 oz)   06/27/16 85 kg (187 lb 6.4 oz)              Today, you had the following     No orders found for display       Primary Care Provider Office Phone # Fax #    Silvina Patiño [phone]       [facility] [address] [address]        Thank you!     Thank you for choosing [facility]  for your care. Our goal is always to provide you with excellent care. Hearing back from our patients is one way we can continue to improve our services. Please take a few minutes to complete the written survey that you may receive in the mail after your visit with us. Thank you!             Your Updated Medication List - Protect others around you: Learn how to safely use, store and throw away your medicines at www.disposemymeds.org.          This list is accurate as of: 5/12/17 11:59 PM.  Always use your most recent med list.                   Brand Name Dispense Instructions for use    * ammonium lactate 12 % cream    AMLACTIN    300 g    Apply twice daily to legs       * ammonium lactate 12 % cream    LAC-HYDRIN    385 g    Apply topically 2 times daily as needed for dry skin       ascorbic acid 1000 MG Tabs    vitamin C     Take 1,000 mg by mouth daily       aspirin 81 MG chewable tablet      Take 81 mg by mouth daily       blood glucose monitoring lancets     3 Box    Use to test blood sugars 2 as directed.       blood glucose monitoring test strip    ONE TOUCH ULTRA    60 strip    Use to test blood sugars 2 times daily or as directed.       ciprofloxacin-dexamethasone otic suspension    CIPRODEX    1 Bottle    Place 4 drops into both ears 2 times daily       clopidogrel 75 MG tablet    PLAVIX    30 tablet    Take 1 tablet (75 mg) by mouth daily       * econazole nitrate 1 % cream     85 g    Apply topically 2 times daily       * econazole nitrate 1 % cream     85 g    Apply topically 2 times daily       ferrous sulfate 325  "(65 FE) MG tablet    IRON    60 tablet    Take 1 tablet (325 mg) by mouth 2 times daily       gentamicin 0.1 % ointment    GARAMYCIN    30 g    Apply topically daily To right leg ulcer daily.       hydrochlorothiazide 12.5 MG Tabs tablet     90 tablet    Take 1 tablet (12.5 mg) by mouth daily       hydrocortisone 0.2 % cream    WESTCORT    15 g    Apply sparingly to affected area twice times daily for 14 days.       insulin glargine 100 UNIT/ML injection    LANTUS SOLOSTAR    9 mL    Inject 25 Units Subcutaneous At Bedtime       insulin pen needle 31G X 8 MM    B-D U/F    100 each    Use 1 daily or as directed       * ketoconazole 2 % cream    NIZORAL    60 g    Apply topically 2 times daily       * ketoconazole 2 % cream    NIZORAL    60 g    Apply topically 2 times daily To toenails.       levofloxacin 750 MG tablet    LEVAQUIN    14 tablet    Take 1 tablet (750 mg) by mouth daily       lisinopril 40 MG tablet    PRINIVIL/ZESTRIL    90 tablet    Take 1 tablet (40 mg) by mouth daily       mupirocin 2 % ointment    BACTROBAN    22 g    Apply topically daily       nateglinide 120 MG tablet    STARLIX    90 tablet    TAKE 1 TABLET BY MOUTH THREE TIMES DAILY BEFORE MEALS       nystatin Powd     60 g    Apply twice daily to feet       nystatin-triamcinolone cream    MYCOLOG II    60 g    Apply topically 2 times daily       OPTIFOAM 6\"X6\" Pads     1 each    1 Box once a week       * order for DME     2 each    Please measure and distribute 1 pair of 20mm Hg - 30mm Hg knee high ULCER CARE open or closed toe compression stockings.  Patient has a size 13 foot and please take this into consideration.  Jobst or equivalent       * order for DME     3 each    Please measure and distribute 1 pair of 20mmHg - 30mmHg knee high open or closed toe compression stockings. Jobst ultrasheer or equivalent.       * order for DME     2 each    Please measure and distribute 1 pair of 30mmHg - 40mmHg knee high open toe ulcercare compression " stockings. Jobst ultrasheer or equivalent.       oxyCODONE 5 MG IR tablet    ROXICODONE    20 tablet    Take 1 tablet (5 mg) by mouth every 4 hours as needed for moderate to severe pain       sildenafil 50 MG cap/tab    REVATIO/VIAGRA    10 tablet    Take 1 tablet (50 mg) by mouth daily as needed for erectile dysfunction       simvastatin 10 MG tablet    ZOCOR    90 tablet    Take 1 tablet (10 mg) by mouth At Bedtime       sitagliptin 100 MG tablet    JANUVIA    90 tablet    Take 1 tablet (100 mg) by mouth daily       SSD 1 % cream   Generic drug:  silver sulfADIAZINE          triamcinolone 0.1 % cream    KENALOG    453.6 g    Apply sparingly daily to affected areas on feet and legs.       VITAMIN D3 PO      Take by mouth daily       * Notice:  This list has 9 medication(s) that are the same as other medications prescribed for you. Read the directions carefully, and ask your doctor or other care provider to review them with you.

## 2017-05-12 NOTE — NURSING NOTE
"Amos Walker's goals for this visit include: Right leg recheck   He requests these members of his care team be copied on today's visit information: yes    PCP: Silvina Patiño    Referring Provider:  No referring provider defined for this encounter.    Chief Complaint   Patient presents with     RECHECK     Right leg recheck       Initial /67  Pulse 102  SpO2 97% Estimated body mass index is 23.67 kg/(m^2) as calculated from the following:    Height as of 6/2/15: 1.892 m (6' 2.49\").    Weight as of 2/6/17: 84.7 kg (186 lb 12.8 oz).  Medication Reconciliation: complete    "

## 2017-05-12 NOTE — PATIENT INSTRUCTIONS
Thanks for coming today.  [facility]  [address] [place] [address]    To schedule future appointments in Ortho Clinic, you may call [phone].    To schedule ordered imaging by your Provider: Call [facility] at [phone]    MediaLAB available online at:   [email or web address]    Please call if any further questions or concerns [phone] and ask for the Orthopedic Department. Clinic hours 8 am to 5 pm.    Return to clinic if symptoms worsen.

## 2017-05-22 ENCOUNTER — MYC REFILL (OUTPATIENT)
Dept: INTERNAL MEDICINE | Facility: CLINIC | Age: 72
End: 2017-05-22

## 2017-05-22 DIAGNOSIS — E11.9 DIABETES MELLITUS, TYPE II (H): ICD-10-CM

## 2017-05-24 NOTE — TELEPHONE ENCOUNTER
Message from Gabriela:  Laverne Brush, RN Mon May 22, 2017 2:55 PM        ----- Message -----   From: Amos Walker   Sent: 5/22/2017 2:34 PM   To: [facility] Nursing Staff-  Subject: Medication Renewal Request     Original authorizing provider: MD Amos Srivastava would like a refill of the following medications:  insulin pen needle (B-D U/F) 31G X 8 MM [Silvina Patiño MD]    [facility] [address] [place] [address] [address] AT [facility]    Comment:

## 2017-05-26 ENCOUNTER — OFFICE VISIT (OUTPATIENT)
Dept: PODIATRY | Facility: CLINIC | Age: 72
End: 2017-05-26
Payer: MEDICARE

## 2017-05-26 VITALS — DIASTOLIC BLOOD PRESSURE: 70 MMHG | SYSTOLIC BLOOD PRESSURE: 119 MMHG | HEART RATE: 101 BPM | OXYGEN SATURATION: 99 %

## 2017-05-26 DIAGNOSIS — L97.912 ULCER OF RIGHT LOWER LEG, WITH FAT LAYER EXPOSED (H): Primary | ICD-10-CM

## 2017-05-26 DIAGNOSIS — I87.8 VENOUS STASIS: ICD-10-CM

## 2017-05-26 PROCEDURE — 99212 OFFICE O/P EST SF 10 MIN: CPT | Performed by: PODIATRIST

## 2017-05-26 RX ORDER — MUPIROCIN CALCIUM 20 MG/G
CREAM TOPICAL 2 TIMES DAILY
Qty: 30 G | Refills: 3 | Status: ON HOLD | OUTPATIENT
Start: 2017-05-26 | End: 2017-08-25

## 2017-05-26 RX ORDER — GENTAMICIN SULFATE 1 MG/G
CREAM TOPICAL 2 TIMES DAILY
Qty: 30 G | Refills: 3 | Status: ON HOLD | OUTPATIENT
Start: 2017-05-26 | End: 2017-08-25
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
## 2017-05-26 NOTE — NURSING NOTE
"Amos Walker's goals for this visit include: Right leg recheck   He requests these members of his care team be copied on today's visit information: yes    PCP: Silvina Patiño    Referring Provider:  No referring provider defined for this encounter.    Chief Complaint   Patient presents with     RECHECK     Right leg ulcer check        Initial /70  Pulse 101  SpO2 99% Estimated body mass index is 23.67 kg/(m^2) as calculated from the following:    Height as of 6/2/15: 1.892 m (6' 2.49\").    Weight as of 2/6/17: 84.7 kg (186 lb 12.8 oz).  Medication Reconciliation: complete    "

## 2017-05-26 NOTE — PATIENT INSTRUCTIONS
Thanks for coming today.  Ortho[facility] Medicine Clinic  [address] [place] [address]    To schedule future appointments in Ortho Clinic, you may call [phone].    To schedule ordered imaging by your Provider: Call [facility] at [phone]    iPipeline available online at:   [email or web address]    Please call if any further questions or concerns [phone] and ask for the Orthopedic Department. Clinic hours 8 am to 5 pm.    Return to clinic if symptoms worsen.

## 2017-05-29 DIAGNOSIS — E11.9 DIABETES MELLITUS, TYPE II (H): ICD-10-CM

## 2017-06-01 ENCOUNTER — OFFICE VISIT (OUTPATIENT)
Dept: PODIATRY | Facility: CLINIC | Age: 72
End: 2017-06-01
Payer: MEDICARE

## 2017-06-01 VITALS — TEMPERATURE: 97.9 F | DIASTOLIC BLOOD PRESSURE: 58 MMHG | SYSTOLIC BLOOD PRESSURE: 124 MMHG

## 2017-06-01 DIAGNOSIS — L97.912 ULCER OF RIGHT LEG, WITH FAT LAYER EXPOSED (H): Primary | ICD-10-CM

## 2017-06-01 DIAGNOSIS — I87.8 VENOUS STASIS: ICD-10-CM

## 2017-06-01 LAB
GRAM STN SPEC: NORMAL
MICRO REPORT STATUS: NORMAL
SPECIMEN SOURCE: NORMAL

## 2017-06-01 PROCEDURE — 87075 CULTR BACTERIA EXCEPT BLOOD: CPT | Performed by: PODIATRIST

## 2017-06-01 PROCEDURE — 87186 SC STD MICRODIL/AGAR DIL: CPT | Performed by: PODIATRIST

## 2017-06-01 PROCEDURE — 87205 SMEAR GRAM STAIN: CPT | Performed by: PODIATRIST

## 2017-06-01 PROCEDURE — 87070 CULTURE OTHR SPECIMN AEROBIC: CPT | Performed by: PODIATRIST

## 2017-06-01 PROCEDURE — 99213 OFFICE O/P EST LOW 20 MIN: CPT | Performed by: PODIATRIST

## 2017-06-01 PROCEDURE — 87077 CULTURE AEROBIC IDENTIFY: CPT | Performed by: PODIATRIST

## 2017-06-01 RX ORDER — CLINDAMYCIN HCL 300 MG
300 CAPSULE ORAL 3 TIMES DAILY
Qty: 30 CAPSULE | Refills: 0 | Status: ON HOLD | OUTPATIENT
Start: 2017-06-01 | End: 2017-08-25

## 2017-06-01 RX ORDER — LEVOFLOXACIN 750 MG/1
750 TABLET, FILM COATED ORAL DAILY
Qty: 14 TABLET | Refills: 0 | Status: ON HOLD | OUTPATIENT
Start: 2017-06-01 | End: 2017-08-25

## 2017-06-01 ASSESSMENT — PAIN SCALES - GENERAL: PAINLEVEL: MILD PAIN (2)

## 2017-06-01 NOTE — NURSING NOTE
"Amos Walker's goals for this visit include: Recheck left leg ulcer.   He requests these members of his care team be copied on today's visit information: yes    PCP: Silvina Patiño    Referring Provider:  ESTABLISHED PATIENT  No address on file    Chief Complaint   Patient presents with     RECHECK     Recheck right leg ulcer       Initial /58  Temp 97.9  F (36.6  C) Estimated body mass index is 23.67 kg/(m^2) as calculated from the following:    Height as of 6/2/15: 1.892 m (6' 2.49\").    Weight as of 2/6/17: 84.7 kg (186 lb 12.8 oz).  Medication Reconciliation: complete    "
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
## 2017-06-01 NOTE — PATIENT INSTRUCTIONS
Thanks for coming today.  [facility]  [address] [place] [address]    To schedule future appointments in Ortho Clinic, you may call [phone].    To schedule ordered imaging by your provider:   Call [phone]    To schedule an injection ordered by your provider:  Call [facility] Injection scheduling line: [phone]  [email or web address] available online at:  [email or web address]    Please call if any further questions or concerns ([phone]).  Clinic hours 8 am to 5 pm.    Return to clinic (call) if symptoms worsen or fail to improve.

## 2017-06-01 NOTE — MR AVS SNAPSHOT
After Visit Summary   2017    Amos Walker    MRN: [id number]           Patient Information     Date Of Birth          1945        Visit Information        Provider Department      2017 11:00 AM Brayan Mcclain DPM [facility]        Today's Diagnoses     Ulcer of right leg, with fat layer exposed (H)    -  1    Venous stasis          Care Instructions    Thanks for coming today.  [facility]  [id number] [place] [id number]    To schedule future appointments in Ortho Clinic, you may call [phone].    To schedule ordered imaging by your provider:   Call Central Imaging [phone]    To schedule an injection ordered by your provider:  Call Central Imaging Injection scheduling line: [phone]  Concert Windowhart available online at:  [email or web address]    Please call if any further questions or concerns ([phone]).  Clinic hours 8 am to 5 pm.    Return to clinic (call) if symptoms worsen or fail to improve.            Follow-ups after your visit        Your next 10 appointments already scheduled     2017  2:30 PM CDT   Return Visit with Brayan Mcclain DPM   [facility] ([facility])    [address] [place] [address] [phone]            2017 10:30 AM CDT   RETURN RETINA with Jen Aranda MD   [facility] Clinic ([facility])    Sony Chery [facility]  [address]  [address] [address]  [facility] [id number] [phone]            2017 10:55 AM CDT   (Arrive by 10:40 AM)   Return Visit with Racheal Swift MD   [facility] ([facility])    [address]  [address] [facility] [address] [phone]              Who to contact     If you have questions or need follow up information about today's clinic visit or your schedule please contact [facility] directly at [phone].  Normal or non-critical lab and imaging results will be communicated to you by [employer] Soapshart, letter or phone within 4 business days after the clinic has received the results. If you do not hear from us within 7 days, please contact the clinic through [employer] Soapshart or phone. If you have a critical or abnormal lab result, we will notify you by phone as soon as possible.  Submit refill requests through [employer] or call your pharmacy and they will forward the refill request to us. Please allow 3 business days for your refill to be completed.          Additional Information About Your Visit        SoapBox SoapsharArriveBefore Information     [employer] gives you secure access to your electronic health record. If you see a primary care provider, you can also send messages to your care team and make appointments. If you have questions, please call your primary care clinic.  If you do not have a primary care provider, please call [phone] and they will assist you.      [employer] is an electronic gateway that provides easy, online access to your medical records. With [employer], you can request a clinic appointment, read your test results, renew a prescription or communicate with your care team.     To access your existing account, please contact your [facility] or call [phone] for assistance.        Care EveryWhere ID     This is your Care EveryWhere ID. This could be used by other organizations to access your [place] medical records  [id number]        Your Vitals Were     Temperature                   97.9  F (36.6  C)            Blood Pressure from Last 3 Encounters:   06/01/17 124/58   05/26/17 119/70   05/25/17 127/65    Weight from Last 3 Encounters:   02/06/17 84.7 kg (186 lb 12.8 oz)   10/31/16 87.8 kg (193 lb 8 oz)   06/27/16 85 kg (187 lb 6.4 oz)              We Performed the Following     Anaerobic bacterial culture     Gram stain     Wound Culture Aerobic Bacterial           Today's Medication Changes          These changes are accurate as of: 6/1/17 11:59 PM.  If you have any questions, ask your nurse or doctor.               Start taking these medicines.        Dose/Directions    clindamycin 300 MG capsule   Commonly known as:  CLEOCIN   Used for:  Ulcer of right leg, with fat layer exposed (H), Venous stasis   Started by:  Brayan Mcclain DPM        Dose:  300 mg   Take 1 capsule (300 mg) by mouth 3 times daily   Quantity:  30 capsule   Refills:  0         These medicines have changed or have updated prescriptions.        Dose/Directions    * levofloxacin 750 MG tablet   Commonly known as:  LEVAQUIN   This may have changed:  Another medication with the same name was added. Make sure you understand how and when to take each.   Used for:  Ulcer of right lower leg, with fat layer exposed (H), Venous stasis   Changed by:  Brayan Mcclain DPM        Dose:  750 mg   Take 1 tablet (750 mg) by mouth daily   Quantity:  14 tablet   Refills:  0       * levofloxacin 750 MG tablet   Commonly known as:  LEVAQUIN   This may have changed:  You were already taking a medication with the same name, and this prescription was added. Make sure you understand how and when to take each.   Used for:  Ulcer of right leg, with fat layer exposed (H), Venous stasis   Changed by:  Brayna Mcclain DPM        Dose:  750 mg   Take 1 tablet (750 mg) by mouth daily   Quantity:  14 tablet   Refills:  0       * Notice:  This list has 2 medication(s) that are the same as other medications prescribed for you. Read the directions carefully, and ask your doctor or other care provider to review them with you.         Where to get your medicines      These medications were sent to [id number] - [place], [place] - [address] AT [place] [address], [facility] [address]     Phone:  [phone]     clindamycin 300 MG capsule    levofloxacin 750 MG tablet                Primary  Care Provider Office Phone # Fax #    Silvina Patiño [phone]       [address] [address]        Thank you!     Thank you for choosing [facility]  for your care. Our goal is always to provide you with excellent care. Hearing back from our patients is one way we can continue to improve our services. Please take a few minutes to complete the written survey that you may receive in the mail after your visit with us. Thank you!             Your Updated Medication List - Protect others around you: Learn how to safely use, store and throw away your medicines at www.disposemymeds.org.          This list is accurate as of: 6/1/17 11:59 PM.  Always use your most recent med list.                   Brand Name Dispense Instructions for use    * ammonium lactate 12 % cream    AMLACTIN    300 g    Apply twice daily to legs       * ammonium lactate 12 % cream    LAC-HYDRIN    385 g    Apply topically 2 times daily as needed for dry skin       ascorbic acid 1000 MG Tabs    vitamin C     Take 1,000 mg by mouth daily       aspirin 81 MG chewable tablet      Take 81 mg by mouth daily       blood glucose monitoring lancets     3 Box    Use to test blood sugars 2 as directed.       blood glucose monitoring test strip    ONE TOUCH ULTRA    60 strip    Use to test blood sugars 2 times daily or as directed.       ciprofloxacin-dexamethasone otic suspension    CIPRODEX    1 Bottle    Place 4 drops into both ears 2 times daily       clindamycin 300 MG capsule    CLEOCIN    30 capsule    Take 1 capsule (300 mg) by mouth 3 times daily       clopidogrel 75 MG tablet    PLAVIX    30 tablet    Take 1 tablet (75 mg) by mouth daily       * econazole nitrate 1 % cream     85 g    Apply topically 2 times daily       * econazole nitrate 1 % cream     85 g    Apply topically 2 times daily       ferrous sulfate 325 (65 FE) MG tablet    IRON    60 tablet    Take 1 tablet  "(325 mg) by mouth 2 times daily       * gentamicin 0.1 % ointment    GARAMYCIN    30 g    Apply topically daily To right leg ulcer daily.       * gentamicin 0.1 % cream    GARAMYCIN    30 g    Apply topically 2 times daily To right leg ulcer.       hydrochlorothiazide 12.5 MG Tabs tablet     90 tablet    Take 1 tablet (12.5 mg) by mouth daily       hydrocortisone 0.2 % cream    WESTCORT    15 g    Apply sparingly to affected area twice times daily for 14 days.       insulin glargine 100 UNIT/ML injection    LANTUS SOLOSTAR    9 mL    Inject 25 Units Subcutaneous At Bedtime       insulin pen needle 31G X 8 MM    B-D U/F    100 each    Use 1 daily o as directed       * ketoconazole 2 % cream    NIZORAL    60 g    Apply topically 2 times daily       * ketoconazole 2 % cream    NIZORAL    60 g    Apply topically 2 times daily To toenails.       * levofloxacin 750 MG tablet    LEVAQUIN    14 tablet    Take 1 tablet (750 mg) by mouth daily       * levofloxacin 750 MG tablet    LEVAQUIN    14 tablet    Take 1 tablet (750 mg) by mouth daily       lisinopril 40 MG tablet    PRINIVIL/ZESTRIL    90 tablet    Take 1 tablet (40 mg) by mouth daily       mupirocin 2 % cream    BACTROBAN    30 g    Apply topically 2 times daily To right leg ulcer.       mupirocin 2 % ointment    BACTROBAN    22 g    Apply topically daily       nateglinide 120 MG tablet    STARLIX    90 tablet    TAKE 1 TABLET BY MOUTH THREE TIMES DAILY BEFORE MEALS       nystatin Powd     60 g    Apply twice daily to feet       nystatin-triamcinolone cream    MYCOLOG II    60 g    Apply topically 2 times daily       OPTIFOAM 6\"X6\" Pads     1 each    1 Box once a week       * order for DME     2 each    Please measure and distribute 1 pair of 20mm Hg - 30mm Hg knee high ULCER CARE open or closed toe compression stockings.  Patient has a size 13 foot and please take this into consideration.  Jobst or equivalent       * order for DME     3 each    Please measure and " distribute 1 pair of 20mmHg - 30mmHg knee high open or closed toe compression stockings. Jobst ultrasheer or equivalent.       * order for DME     2 each    Please measure and distribute 1 pair of 30mmHg - 40mmHg knee high open toe ulcercare compression stockings. Jobst ultrasheer or equivalent.       oxyCODONE 5 MG IR tablet    ROXICODONE    20 tablet    Take 1 tablet (5 mg) by mouth every 4 hours as needed for moderate to severe pain       sildenafil 50 MG cap/tab    REVATIO/VIAGRA    10 tablet    Take 1 tablet (50 mg) by mouth daily as needed for erectile dysfunction       simvastatin 10 MG tablet    ZOCOR    90 tablet    Take 1 tablet (10 mg) by mouth At Bedtime       sitagliptin 100 MG tablet    JANUVIA    90 tablet    Take 1 tablet (100 mg) by mouth daily       SSD 1 % cream   Generic drug:  silver sulfADIAZINE          triamcinolone 0.1 % cream    KENALOG    453.6 g    Apply sparingly daily to affected areas on feet and legs.       VITAMIN D3 PO      Take by mouth daily       * Notice:  This list has 13 medication(s) that are the same as other medications prescribed for you. Read the directions carefully, and ask your doctor or other care provider to review them with you.

## 2017-06-05 ENCOUNTER — OFFICE VISIT (OUTPATIENT)
Dept: PODIATRY | Facility: CLINIC | Age: 72
End: 2017-06-05
Payer: MEDICARE

## 2017-06-05 VITALS — OXYGEN SATURATION: 96 % | DIASTOLIC BLOOD PRESSURE: 68 MMHG | SYSTOLIC BLOOD PRESSURE: 117 MMHG | HEART RATE: 90 BPM

## 2017-06-05 DIAGNOSIS — E11.49 DIABETIC NEUROPATHY WITH NEUROLOGIC COMPLICATION (H): ICD-10-CM

## 2017-06-05 DIAGNOSIS — L97.912 ULCER OF RIGHT LOWER LEG, WITH FAT LAYER EXPOSED (H): Primary | ICD-10-CM

## 2017-06-05 DIAGNOSIS — I87.8 VENOUS STASIS: ICD-10-CM

## 2017-06-05 DIAGNOSIS — E11.40 DIABETIC NEUROPATHY WITH NEUROLOGIC COMPLICATION (H): ICD-10-CM

## 2017-06-05 LAB
BACTERIA SPEC CULT: ABNORMAL
MICRO REPORT STATUS: ABNORMAL
MICROORGANISM SPEC CULT: ABNORMAL
SPECIMEN SOURCE: ABNORMAL

## 2017-06-05 PROCEDURE — 99213 OFFICE O/P EST LOW 20 MIN: CPT | Performed by: PODIATRIST
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
## 2017-06-05 NOTE — NURSING NOTE
"Amos Walker's goals for this visit include: Follow up right leg wound   He requests these members of his care team be copied on today's visit information: yes    PCP: Silvina Patiño    Referring Provider:  ESTABLISHED PATIENT  No address on file    Chief Complaint   Patient presents with     RECHECK     Right leg follow up.        Initial /68  Pulse 90  SpO2 96% Estimated body mass index is 23.67 kg/(m^2) as calculated from the following:    Height as of 6/2/15: 1.892 m (6' 2.49\").    Weight as of 2/6/17: 84.7 kg (186 lb 12.8 oz).  Medication Reconciliation: complete  "

## 2017-06-05 NOTE — MR AVS SNAPSHOT
After Visit Summary   2017    Amos Walker    MRN: [id number]           Patient Information     Date Of Birth          1945        Visit Information        Provider Department      2017 2:30 PM Brayan Mcclain DPM [facility]        Today's Diagnoses     Ulcer of right lower leg, with fat layer exposed (H)    -  1    Diabetic neuropathy with neurologic complication (H)        Venous stasis          Care Instructions    Thanks for coming today.  Ortho/Sports Medicine Clinic  [address] [place] [address]    To schedule future appointments in Ortho Clinic, you may call [phone].    To schedule ordered imaging by your provider:   Call Central Imaging [phone]    To schedule an injection ordered by your provider:  Call Central Imaging Injection scheduling line: [phone]  MyChart available online at:  [email or web address]    Please call if any further questions or concerns ([phone]).  Clinic hours 8 am to 5 pm.    Return to clinic (call) if symptoms worsen or fail to improve.          Follow-ups after your visit        Additional Services     INFECTIOUS DISEASE REFERRAL       Please evaluate and manage to help with antibiotic management in chronic leg ulcer with Pseudomonas on swab culture.                  Your next 10 appointments already scheduled     2017 10:15 AM CDT   Return Visit with Brayan Mcclain DPM   [facility]    [address] [place] [address] [id number] [phone]            2017 10:30 AM CDT   RETURN RETINA with Jen Aranda MD   Eye Clinic ([facility])    [address]ony [address] [facility] [address] [place] [address]   [phone]            2017 10:55 AM CDT   (Arrive by 10:40 AM)   Return Visit with Racheal Swift MD   [facility] ([facility])    [address]  [address] [facility] [address]   [phone]            Jun 29, 2017 10:00 AM CDT   (Arrive by 9:45 AM)   New Patient Visit with Sinai Hoover MD   [facility] ([facility])    [address]  [address] [facility] [address]   [phone]              Who to contact     If you have questions or need follow up information about today's clinic visit or your schedule please contact [facility] directly at [phone].  Normal or non-critical lab and imaging results will be communicated to you by GamePresshart, letter or phone within 4 business days after the clinic has received the results. If you do not hear from us within 7 days, please contact the clinic through GamePresshart or phone. If you have a critical or abnormal lab result, we will notify you by phone as soon as possible.  Submit refill requests through Complexa or call your pharmacy and they will forward the refill request to us. Please allow 3 business days for your refill to be completed.          Additional Information About Your Visit        Complexa Information     Complexa gives you secure access to your electronic health record. If you see a primary care provider, you can also send messages to your care team and make appointments. If you have questions, please call your primary care clinic.  If you do not have a primary care provider, please call [phone] and they will assist you.      Complexa is an electronic gateway that provides easy, online access to your medical records. With Complexa, you can request a clinic appointment, read your test results, renew a prescription or communicate with your care team.     To access your existing account, please contact your [facility] or call [phone] for assistance.        Care EveryWhere ID     This is your Care EveryWhere ID. This  could be used by other organizations to access your [place] medical records  [id number]        Your Vitals Were     Pulse Pulse Oximetry                90 96%           Blood Pressure from Last 3 Encounters:   06/05/17 117/68   06/01/17 124/58   05/26/17 119/70    Weight from Last 3 Encounters:   02/06/17 84.7 kg (186 lb 12.8 oz)   10/31/16 87.8 kg (193 lb 8 oz)   06/27/16 85 kg (187 lb 6.4 oz)              We Performed the Following     INFECTIOUS DISEASE REFERRAL        Primary Care Provider Office Phone # Fax #    Silvina Patiño [phone]       [address] [address]        Thank you!     Thank you for choosing [facility]  for your care. Our goal is always to provide you with excellent care. Hearing back from our patients is one way we can continue to improve our services. Please take a few minutes to complete the written survey that you may receive in the mail after your visit with us. Thank you!             Your Updated Medication List - Protect others around you: Learn how to safely use, store and throw away your medicines at www.disposemymeds.org.          This list is accurate as of: 6/5/17  3:06 PM.  Always use your most recent med list.                   Brand Name Dispense Instructions for use    * ammonium lactate 12 % cream    AMLACTIN    300 g    Apply twice daily to legs       * ammonium lactate 12 % cream    LAC-HYDRIN    385 g    Apply topically 2 times daily as needed for dry skin       ascorbic acid 1000 MG Tabs    vitamin C     Take 1,000 mg by mouth daily       aspirin 81 MG chewable tablet      Take 81 mg by mouth daily       blood glucose monitoring lancets     3 Box    Use to test blood sugars 2 as directed.       blood glucose monitoring test strip    ONE TOUCH ULTRA    60 strip    Use to test blood sugars 2 times daily or as directed.       ciprofloxacin-dexamethasone otic suspension    CIPRODEX    1 Bottle     Place 4 drops into both ears 2 times daily       clindamycin 300 MG capsule    CLEOCIN    30 capsule    Take 1 capsule (300 mg) by mouth 3 times daily       clopidogrel 75 MG tablet    PLAVIX    30 tablet    Take 1 tablet (75 mg) by mouth daily       * econazole nitrate 1 % cream     85 g    Apply topically 2 times daily       * econazole nitrate 1 % cream     85 g    Apply topically 2 times daily       ferrous sulfate 325 (65 FE) MG tablet    IRON    60 tablet    Take 1 tablet (325 mg) by mouth 2 times daily       * gentamicin 0.1 % ointment    GARAMYCIN    30 g    Apply topically daily To right leg ulcer daily.       * gentamicin 0.1 % cream    GARAMYCIN    30 g    Apply topically 2 times daily To right leg ulcer.       hydrochlorothiazide 12.5 MG Tabs tablet     90 tablet    Take 1 tablet (12.5 mg) by mouth daily       hydrocortisone 0.2 % cream    WESTCORT    15 g    Apply sparingly to affected area twice times daily for 14 days.       insulin glargine 100 UNIT/ML injection    LANTUS SOLOSTAR    9 mL    Inject 25 Units Subcutaneous At Bedtime       insulin pen needle 31G X 8 MM    B-D U/F    100 each    Use 1 daily o as directed       * ketoconazole 2 % cream    NIZORAL    60 g    Apply topically 2 times daily       * ketoconazole 2 % cream    NIZORAL    60 g    Apply topically 2 times daily To toenails.       * levofloxacin 750 MG tablet    LEVAQUIN    14 tablet    Take 1 tablet (750 mg) by mouth daily       * levofloxacin 750 MG tablet    LEVAQUIN    14 tablet    Take 1 tablet (750 mg) by mouth daily       lisinopril 40 MG tablet    PRINIVIL/ZESTRIL    90 tablet    Take 1 tablet (40 mg) by mouth daily       mupirocin 2 % cream    BACTROBAN    30 g    Apply topically 2 times daily To right leg ulcer.       mupirocin 2 % ointment    BACTROBAN    22 g    Apply topically daily       nateglinide 120 MG tablet    STARLIX    90 tablet    TAKE 1 TABLET BY MOUTH THREE TIMES DAILY BEFORE MEALS       nystatin Powd      "60 g    Apply twice daily to feet       nystatin-triamcinolone cream    MYCOLOG II    60 g    Apply topically 2 times daily       OPTIFOAM 6\"X6\" Pads     1 each    1 Box once a week       * order for DME     2 each    Please measure and distribute 1 pair of 20mm Hg - 30mm Hg knee high ULCER CARE open or closed toe compression stockings.  Patient has a size 13 foot and please take this into consideration.  Jobst or equivalent       * order for DME     3 each    Please measure and distribute 1 pair of 20mmHg - 30mmHg knee high open or closed toe compression stockings. Jobst ultrasheer or equivalent.       * order for DME     2 each    Please measure and distribute 1 pair of 30mmHg - 40mmHg knee high open toe ulcercare compression stockings. Jobst ultrasheer or equivalent.       oxyCODONE 5 MG IR tablet    ROXICODONE    20 tablet    Take 1 tablet (5 mg) by mouth every 4 hours as needed for moderate to severe pain       sildenafil 50 MG cap/tab    REVATIO/VIAGRA    10 tablet    Take 1 tablet (50 mg) by mouth daily as needed for erectile dysfunction       simvastatin 10 MG tablet    ZOCOR    90 tablet    Take 1 tablet (10 mg) by mouth At Bedtime       sitagliptin 100 MG tablet    JANUVIA    90 tablet    Take 1 tablet (100 mg) by mouth daily       SSD 1 % cream   Generic drug:  silver sulfADIAZINE          triamcinolone 0.1 % cream    KENALOG    453.6 g    Apply sparingly daily to affected areas on feet and legs.       VITAMIN D3 PO      Take by mouth daily       * Notice:  This list has 13 medication(s) that are the same as other medications prescribed for you. Read the directions carefully, and ask your doctor or other care provider to review them with you.      "

## 2017-06-05 NOTE — PATIENT INSTRUCTIONS
Thanks for coming today.  Ortho/Sports Medicine Clinic  [address] [place] [address]    To schedule future appointments in Ortho Clinic, you may call [phone].    To schedule ordered imaging by your provider:   Call Central Imaging [phone]    To schedule an injection ordered by your provider:  Call Central Imaging Injection scheduling line: [phone]  AerSale Holdingshart available online at:  [email or web address]    Please call if any further questions or concerns ([phone]).  Clinic hours 8 am to 5 pm.    Return to clinic (call) if symptoms worsen or fail to improve.

## 2017-06-08 LAB
BACTERIA SPEC CULT: NORMAL
Lab: NORMAL
MICRO REPORT STATUS: NORMAL
SPECIMEN SOURCE: NORMAL

## 2017-06-09 ENCOUNTER — OFFICE VISIT (OUTPATIENT)
Dept: PODIATRY | Facility: CLINIC | Age: 72
End: 2017-06-09
Payer: MEDICARE

## 2017-06-09 VITALS — OXYGEN SATURATION: 98 % | SYSTOLIC BLOOD PRESSURE: 125 MMHG | HEART RATE: 90 BPM | DIASTOLIC BLOOD PRESSURE: 67 MMHG

## 2017-06-09 DIAGNOSIS — L97.912 ULCER OF RIGHT LOWER LEG, WITH FAT LAYER EXPOSED (H): Primary | ICD-10-CM

## 2017-06-09 DIAGNOSIS — E11.51 DIABETES MELLITUS WITH PERIPHERAL VASCULAR DISEASE (H): ICD-10-CM

## 2017-06-09 PROCEDURE — 99213 OFFICE O/P EST LOW 20 MIN: CPT | Performed by: PODIATRIST

## 2017-06-09 ASSESSMENT — PAIN SCALES - GENERAL: PAINLEVEL: NO PAIN (0)
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
## 2017-06-09 NOTE — MR AVS SNAPSHOT
After Visit Summary   2017    Amos Walker    MRN: [id number]           Patient Information     Date Of Birth          1945        Visit Information        Provider Department      2017 10:15 AM Brayan Mcclain DPM [facility]        Today's Diagnoses     Ulcer of right lower leg, with fat layer exposed (H)    -  1    Diabetes mellitus with peripheral vascular disease (H)          Care Instructions    Thanks for coming today.  Ortho/Sports Medicine Clinic  [address] [place] [id number]    To schedule future appointments in Ortho Clinic, you may call [phone].    To schedule ordered imaging by your provider:   Call Central Imaging [phone]    To schedule an injection ordered by your provider:  Call Central Imaging Injection scheduling line: [phone]  MyChart available online at:  [email or web address]    Please call if any further questions or concerns ([phone]).  Clinic hours 8 am to 5 pm.    Return to clinic (call) if symptoms worsen or fail to improve.            Follow-ups after your visit        Your next 10 appointments already scheduled     2017 10:30 AM CDT   RETURN RETINA with Jen Aranda MD   Eye Clinic ([facility])    [address]  [address] [facility]  [address] [id number] [phone]            José 15, 2017 10:00 AM CDT   Return Visit with Brayan Mcclain DPM   [facility]    [address] [place] [address] [id number] [phone]            2017 10:55 AM CDT   (Arrive by 10:40 AM)   Return Visit with Racheal Swift MD   [facility] ([facility])    [address] [facility]  4th [facility] [id number] [phone]            2017 10:00 AM CDT   (Arrive by 9:45 AM)   New Patient Visit with Sinai Hoover MD     [facility] Infectious Diseases ([facility])    [address] [facility]  3rd Floor  [facility] [address] [phone]              Who to contact     If you have questions or need follow up information about today's clinic visit or your schedule please contact [facility] directly at [phone].  Normal or non-critical lab and imaging results will be communicated to you by MyChart, letter or phone within 4 business days after the clinic has received the results. If you do not hear from us within 7 days, please contact the clinic through Snapcioushart or phone. If you have a critical or abnormal lab result, we will notify you by phone as soon as possible.  Submit refill requests through PneumRx or call your pharmacy and they will forward the refill request to us. Please allow 3 business days for your refill to be completed.          Additional Information About Your Visit        PneumRx Information     PneumRx gives you secure access to your electronic health record. If you see a primary care provider, you can also send messages to your care team and make appointments. If you have questions, please call your primary care clinic.  If you do not have a primary care provider, please call [phone] and they will assist you.      PneumRx is an electronic gateway that provides easy, online access to your medical records. With PneumRx, you can request a clinic appointment, read your test results, renew a prescription or communicate with your care team.     To access your existing account, please contact your [facility] or call [phone] for assistance.        Care EveryWhere ID     This is your Care EveryWhere ID. This could be used by other organizations to access your [place] medical records  [id number]        Your Vitals Were     Pulse Pulse Oximetry                90 98%           Blood Pressure from Last 3 Encounters:    06/09/17 125/67   06/05/17 117/68   06/01/17 124/58    Weight from Last 3 Encounters:   02/06/17 84.7 kg (186 lb 12.8 oz)   10/31/16 87.8 kg (193 lb 8 oz)   06/27/16 85 kg (187 lb 6.4 oz)              Today, you had the following     No orders found for display       Primary Care Provider Office Phone # Fax #    Silvina Patiño [phone]       [address] [address]        Thank you!     Thank you for choosing [facility]  for your care. Our goal is always to provide you with excellent care. Hearing back from our patients is one way we can continue to improve our services. Please take a few minutes to complete the written survey that you may receive in the mail after your visit with us. Thank you!             Your Updated Medication List - Protect others around you: Learn how to safely use, store and throw away your medicines at www.disposemymeds.org.          This list is accurate as of: 6/9/17 11:59 PM.  Always use your most recent med list.                   Brand Name Dispense Instructions for use    * ammonium lactate 12 % cream    AMLACTIN    300 g    Apply twice daily to legs       * ammonium lactate 12 % cream    LAC-HYDRIN    385 g    Apply topically 2 times daily as needed for dry skin       ascorbic acid 1000 MG Tabs    vitamin C     Take 1,000 mg by mouth daily       aspirin 81 MG chewable tablet      Take 81 mg by mouth daily       blood glucose monitoring lancets     3 Box    Use to test blood sugars 2 as directed.       blood glucose monitoring test strip    ONE TOUCH ULTRA    60 strip    Use to test blood sugars 2 times daily or as directed.       ciprofloxacin-dexamethasone otic suspension    CIPRODEX    1 Bottle    Place 4 drops into both ears 2 times daily       clindamycin 300 MG capsule    CLEOCIN    30 capsule    Take 1 capsule (300 mg) by mouth 3 times daily       clopidogrel 75 MG tablet    PLAVIX    30 tablet     "Take 1 tablet (75 mg) by mouth daily       * econazole nitrate 1 % cream     85 g    Apply topically 2 times daily       * econazole nitrate 1 % cream     85 g    Apply topically 2 times daily       ferrous sulfate 325 (65 FE) MG tablet    IRON    60 tablet    Take 1 tablet (325 mg) by mouth 2 times daily       * gentamicin 0.1 % ointment    GARAMYCIN    30 g    Apply topically daily To right leg ulcer daily.       * gentamicin 0.1 % cream    GARAMYCIN    30 g    Apply topically 2 times daily To right leg ulcer.       hydrochlorothiazide 12.5 MG Tabs tablet     90 tablet    Take 1 tablet (12.5 mg) by mouth daily       hydrocortisone 0.2 % cream    WESTCORT    15 g    Apply sparingly to affected area twice times daily for 14 days.       insulin glargine 100 UNIT/ML injection    LANTUS SOLOSTAR    9 mL    Inject 25 Units Subcutaneous At Bedtime       insulin pen needle 31G X 8 MM    B-D U/F    100 each    Use 1 daily o as directed       * ketoconazole 2 % cream    NIZORAL    60 g    Apply topically 2 times daily       * ketoconazole 2 % cream    NIZORAL    60 g    Apply topically 2 times daily To toenails.       * levofloxacin 750 MG tablet    LEVAQUIN    14 tablet    Take 1 tablet (750 mg) by mouth daily       * levofloxacin 750 MG tablet    LEVAQUIN    14 tablet    Take 1 tablet (750 mg) by mouth daily       lisinopril 40 MG tablet    PRINIVIL/ZESTRIL    90 tablet    Take 1 tablet (40 mg) by mouth daily       mupirocin 2 % cream    BACTROBAN    30 g    Apply topically 2 times daily To right leg ulcer.       mupirocin 2 % ointment    BACTROBAN    22 g    Apply topically daily       nateglinide 120 MG tablet    STARLIX    90 tablet    TAKE 1 TABLET BY MOUTH THREE TIMES DAILY BEFORE MEALS       nystatin Powd     60 g    Apply twice daily to feet       nystatin-triamcinolone cream    MYCOLOG II    60 g    Apply topically 2 times daily       OPTIFOAM 6\"X6\" Pads     1 each    1 Box once a week       * order for DME     2 " each    Please measure and distribute 1 pair of 20mm Hg - 30mm Hg knee high ULCER CARE open or closed toe compression stockings.  Patient has a size 13 foot and please take this into consideration.  Jobst or equivalent       * order for DME     3 each    Please measure and distribute 1 pair of 20mmHg - 30mmHg knee high open or closed toe compression stockings. Jobst ultrasheer or equivalent.       * order for DME     2 each    Please measure and distribute 1 pair of 30mmHg - 40mmHg knee high open toe ulcercare compression stockings. Jobst ultrasheer or equivalent.       oxyCODONE 5 MG IR tablet    ROXICODONE    20 tablet    Take 1 tablet (5 mg) by mouth every 4 hours as needed for moderate to severe pain       sildenafil 50 MG cap/tab    REVATIO/VIAGRA    10 tablet    Take 1 tablet (50 mg) by mouth daily as needed for erectile dysfunction       simvastatin 10 MG tablet    ZOCOR    90 tablet    Take 1 tablet (10 mg) by mouth At Bedtime       sitagliptin 100 MG tablet    JANUVIA    90 tablet    Take 1 tablet (100 mg) by mouth daily       SSD 1 % cream   Generic drug:  silver sulfADIAZINE          triamcinolone 0.1 % cream    KENALOG    453.6 g    Apply sparingly daily to affected areas on feet and legs.       VITAMIN D3 PO      Take by mouth daily       * Notice:  This list has 13 medication(s) that are the same as other medications prescribed for you. Read the directions carefully, and ask your doctor or other care provider to review them with you.

## 2017-06-09 NOTE — PATIENT INSTRUCTIONS
Thanks for coming today.  Ortho/Sports Medicine Clinic  [address] [place] [address]    To schedule future appointments in Ortho Clinic, you may call [phone].    To schedule ordered imaging by your provider:   Call Central Imaging [phone]    To schedule an injection ordered by your provider:  Call Central Imaging Injection scheduling line: [phone]  Xierkanghart available online at:  [email or web address]    Please call if any further questions or concerns ([phone]).  Clinic hours 8 am to 5 pm.    Return to clinic (call) if symptoms worsen or fail to improve.

## 2017-06-09 NOTE — NURSING NOTE
"Amos Walker's goals for this visit include: Follow up with right leg ulcer  He requests these members of his care team be copied on today's visit information: yes    PCP: Silvina Patiño    Referring Provider:  No referring provider defined for this encounter.    Chief Complaint   Patient presents with     RECHECK     Follow up with right leg wound. Patient feels like he has lost range of motion in his legs since his medication was changed.        Initial /67 (BP Location: Left arm, Patient Position: Chair, Cuff Size: Adult Regular)  Pulse 90  SpO2 98% Estimated body mass index is 23.67 kg/(m^2) as calculated from the following:    Height as of 6/2/15: 1.892 m (6' 2.49\").    Weight as of 2/6/17: 84.7 kg (186 lb 12.8 oz).  Medication Reconciliation: complete    "

## 2017-06-14 ENCOUNTER — OFFICE VISIT (OUTPATIENT)
Dept: OPHTHALMOLOGY | Facility: CLINIC | Age: 72
End: 2017-06-14
Attending: OPHTHALMOLOGY
Payer: MEDICARE

## 2017-06-14 DIAGNOSIS — H52.13 MYOPIA, BILATERAL: Primary | ICD-10-CM

## 2017-06-14 DIAGNOSIS — H25.13 SENILE NUCLEAR SCLEROSIS, BILATERAL: ICD-10-CM

## 2017-06-14 DIAGNOSIS — E11.40 TYPE 2 DIABETES, CONTROLLED, WITH NEUROPATHY (H): ICD-10-CM

## 2017-06-14 PROCEDURE — 92015 DETERMINE REFRACTIVE STATE: CPT | Mod: GY,ZF

## 2017-06-14 PROCEDURE — 99213 OFFICE O/P EST LOW 20 MIN: CPT | Mod: ZF

## 2017-06-14 ASSESSMENT — CUP TO DISC RATIO
OD_RATIO: 0.35
OS_RATIO: 0.35

## 2017-06-14 ASSESSMENT — VISUAL ACUITY
METHOD: SNELLEN - LINEAR
OD_CC: 20/25
CORRECTION_TYPE: GLASSES
OD_CC+: -2
OS_CC+: -2
OS_CC: 20/30

## 2017-06-14 ASSESSMENT — CONF VISUAL FIELD
OD_INFERIOR_TEMPORAL_RESTRICTION: 3
OS_NORMAL: 1

## 2017-06-14 ASSESSMENT — TONOMETRY
OD_IOP_MMHG: 13
OS_IOP_MMHG: 13
IOP_METHOD: TONOPEN

## 2017-06-14 ASSESSMENT — REFRACTION_MANIFEST
OS_CYLINDER: +0.50
OD_CYLINDER: +2.50
OS_AXIS: 165
OD_AXIS: 180
OD_SPHERE: -2.25
OS_ADD: +2.75
OS_SPHERE: -1.25
OD_ADD: +2.75

## 2017-06-14 ASSESSMENT — SLIT LAMP EXAM - LIDS
COMMENTS: NORMAL
COMMENTS: NORMAL

## 2017-06-14 ASSESSMENT — REFRACTION_WEARINGRX
OD_AXIS: 170
OS_SPHERE: -1.50
OS_CYLINDER: +1.25
OD_CYLINDER: +3.25
OD_SPHERE: -2.00
OS_AXIS: 008

## 2017-06-14 ASSESSMENT — EXTERNAL EXAM - RIGHT EYE: OD_EXAM: NORMAL

## 2017-06-14 ASSESSMENT — EXTERNAL EXAM - LEFT EYE: OS_EXAM: NORMAL

## 2017-06-14 NOTE — MR AVS SNAPSHOT
After Visit Summary   6/14/2017    Amos Walker    MRN: [id number]           Patient Information     Date Of Birth          1945        Visit Information        Provider Department      6/14/2017 10:30 AM Jen Aranda MD [facility]        Today's Diagnoses     Myopia, bilateral    -  1    Senile nuclear sclerosis, bilateral        Type 2 diabetes, controlled, with neuropathy (H)           Follow-ups after your visit        Follow-up notes from your care team     Return in about 1 year (around 6/14/2018) for Diabetic exam.      Your next 10 appointments already scheduled     José 15, 2017 10:00 AM CDT   Return Visit with Brayan Mcclain DPM   [facility] ([facility])    [address] [place] [id number] [phone]            Jun 16, 2017 10:55 AM CDT   (Arrive by 10:40 AM)   Return Visit with Racheal Swift MD   [facility] ([facility] [place])    [address] [place] [address] [phone]            Jun 29, 2017 10:00 AM CDT   (Arrive by 9:45 AM)   New Patient Visit with Sinai Hoover MD   [facility] ([facility])    [address] [place] [address] [phone]              Who to contact     Please call your clinic at [phone] to:    Ask questions about your health    Make or cancel appointments    Discuss your medicines    Learn about your test results    Speak to your doctor   If you have compliments or concerns about an experience at your clinic, or if you wish to file a complaint, please contact [facility] Patient Relations at [phone] or email us at [email or web address]         Additional Information About Your Visit        MyChart Information     MyChart gives you secure access to your electronic  health record. If you see a primary care provider, you can also send messages to your care team and make appointments. If you have questions, please call your primary care clinic.  If you do not have a primary care provider, please call [phone] and they will assist you.      Stylr is an electronic gateway that provides easy, online access to your medical records. With Stylr, you can request a clinic appointment, read your test results, renew a prescription or communicate with your care team.     To access your existing account, please contact your [facility] or call [phone] for assistance.        Care EveryWhere ID     This is your Care EveryWhere ID. This could be used by other organizations to access your [facility] medical records  [id number]         Blood Pressure from Last 3 Encounters:   06/09/17 125/67   06/05/17 117/68   06/01/17 124/58    Weight from Last 3 Encounters:   02/06/17 84.7 kg (186 lb 12.8 oz)   10/31/16 87.8 kg (193 lb 8 oz)   06/27/16 85 kg (187 lb 6.4 oz)              Today, you had the following     No orders found for display       Primary Care Provider Office Phone # Fax #    Silvina Patiño [phone]       [address] [address]        Thank you!     Thank you for choosing [facility]  for your care. Our goal is always to provide you with excellent care. Hearing back from our patients is one way we can continue to improve our services. Please take a few minutes to complete the written survey that you may receive in the mail after your visit with us. Thank you!             Your Updated Medication List - Protect others around you: Learn how to safely use, store and throw away your medicines at www.disposemymeds.org.          This list is accurate as of: 6/14/17 11:46 AM.  Always use your most recent med list.                   Brand Name Dispense Instructions for use    * ammonium  lactate 12 % cream    AMLACTIN    300 g    Apply twice daily to legs       * ammonium lactate 12 % cream    LAC-HYDRIN    385 g    Apply topically 2 times daily as needed for dry skin       ascorbic acid 1000 MG Tabs    vitamin C     Take 1,000 mg by mouth daily       aspirin 81 MG chewable tablet      Take 81 mg by mouth daily       blood glucose monitoring lancets     3 Box    Use to test blood sugars 2 as directed.       blood glucose monitoring test strip    ONE TOUCH ULTRA    60 strip    Use to test blood sugars 2 times daily or as directed.       ciprofloxacin-dexamethasone otic suspension    CIPRODEX    1 Bottle    Place 4 drops into both ears 2 times daily       clindamycin 300 MG capsule    CLEOCIN    30 capsule    Take 1 capsule (300 mg) by mouth 3 times daily       clopidogrel 75 MG tablet    PLAVIX    30 tablet    Take 1 tablet (75 mg) by mouth daily       * econazole nitrate 1 % cream     85 g    Apply topically 2 times daily       * econazole nitrate 1 % cream     85 g    Apply topically 2 times daily       ferrous sulfate 325 (65 FE) MG tablet    IRON    60 tablet    Take 1 tablet (325 mg) by mouth 2 times daily       * gentamicin 0.1 % ointment    GARAMYCIN    30 g    Apply topically daily To right leg ulcer daily.       * gentamicin 0.1 % cream    GARAMYCIN    30 g    Apply topically 2 times daily To right leg ulcer.       hydrochlorothiazide 12.5 MG Tabs tablet     90 tablet    Take 1 tablet (12.5 mg) by mouth daily       hydrocortisone 0.2 % cream    WESTCORT    15 g    Apply sparingly to affected area twice times daily for 14 days.       insulin glargine 100 UNIT/ML injection    LANTUS SOLOSTAR    9 mL    Inject 25 Units Subcutaneous At Bedtime       insulin pen needle 31G X 8 MM    B-D U/F    100 each    Use 1 daily o as directed       * ketoconazole 2 % cream    NIZORAL    60 g    Apply topically 2 times daily       * ketoconazole 2 % cream    NIZORAL    60 g    Apply topically 2 times daily To  "toenails.       * levofloxacin 750 MG tablet    LEVAQUIN    14 tablet    Take 1 tablet (750 mg) by mouth daily       * levofloxacin 750 MG tablet    LEVAQUIN    14 tablet    Take 1 tablet (750 mg) by mouth daily       lisinopril 40 MG tablet    PRINIVIL/ZESTRIL    90 tablet    Take 1 tablet (40 mg) by mouth daily       mupirocin 2 % cream    BACTROBAN    30 g    Apply topically 2 times daily To right leg ulcer.       mupirocin 2 % ointment    BACTROBAN    22 g    Apply topically daily       nateglinide 120 MG tablet    STARLIX    90 tablet    TAKE 1 TABLET BY MOUTH THREE TIMES DAILY BEFORE MEALS       nystatin Powd     60 g    Apply twice daily to feet       nystatin-triamcinolone cream    MYCOLOG II    60 g    Apply topically 2 times daily       OPTIFOAM 6\"X6\" Pads     1 each    1 Box once a week       * order for DME     2 each    Please measure and distribute 1 pair of 20mm Hg - 30mm Hg knee high ULCER CARE open or closed toe compression stockings.  Patient has a size 13 foot and please take this into consideration.  Jobst or equivalent       * order for DME     3 each    Please measure and distribute 1 pair of 20mmHg - 30mmHg knee high open or closed toe compression stockings. Jobst ultrasheer or equivalent.       * order for DME     2 each    Please measure and distribute 1 pair of 30mmHg - 40mmHg knee high open toe ulcercare compression stockings. Jobst ultrasheer or equivalent.       oxyCODONE 5 MG IR tablet    ROXICODONE    20 tablet    Take 1 tablet (5 mg) by mouth every 4 hours as needed for moderate to severe pain       sildenafil 50 MG cap/tab    REVATIO/VIAGRA    10 tablet    Take 1 tablet (50 mg) by mouth daily as needed for erectile dysfunction       simvastatin 10 MG tablet    ZOCOR    90 tablet    Take 1 tablet (10 mg) by mouth At Bedtime       sitagliptin 100 MG tablet    JANUVIA    90 tablet    Take 1 tablet (100 mg) by mouth daily       SSD 1 % cream   Generic drug:  silver sulfADIAZINE          " triamcinolone 0.1 % cream    KENALOG    453.6 g    Apply sparingly daily to affected areas on feet and legs.       VITAMIN D3 PO      Take by mouth daily       * Notice:  This list has 13 medication(s) that are the same as other medications prescribed for you. Read the directions carefully, and ask your doctor or other care provider to review them with you.

## 2017-06-14 NOTE — NURSING NOTE
Chief Complaints and History of Present Illnesses   Patient presents with     Diabetic Eye Exam     HPI    Affected eye(s):  Both   Symptoms:     Blurred vision (Comment: intermittent)   No decreased vision   Itching         Do you have eye pain now?:  No      Comments:  BS: does not check every day. 85 Yesterday morning fasting.  Lab Results       Component                Value               Date                       A1C                      6.3                 02/06/2017                 A1C                      6.6                 10/31/2016                 A1C                      6.6                 06/27/2016                 A1C                      6.8                 12/17/2015                 A1C                      8.2                 11/18/2014         Abby Trotter COA 10:59 AM June 14, 2017

## 2017-06-14 NOTE — PROGRESS NOTES
I have confirmed the patient's Past Medical History, Past Surgical History, Social History, Family History, Problem List, Medication List and Technician note.    CC: blurry vision    Amos Walker is a 71 year old male with the following ophthalmologic problems:    Here for diabetic retinopathy check. Notes blurry vision in the left eye. Blood sugars have been fairly well controlled. Following up with endocrinologist next week.    Last HbA1c: 6.8 (12/2015)      ASSESSMENT/PLAN  1. DM without Diabetic Retinopathy  - Blood pressure (<120/80) and blood glucose (HbA1c <7.0) control discussed with patient. Patient advised that failure to adequately control each may lead to vision loss. The patient expressed understanding.  - recommend annual screening    2. Nuclear sclerotic cataract  Both eyes    - Not yet visually significant   - refract as needed    - no new prescription glasses issued    - monitor yearly       3. Refractive Error  - MRx today with significant change left eye- refracts to 20/20  -new refraction dispensed today    4. 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  Professor & Chair

## 2017-06-15 ENCOUNTER — OFFICE VISIT (OUTPATIENT)
Dept: PODIATRY | Facility: CLINIC | Age: 72
End: 2017-06-15
Payer: MEDICARE

## 2017-06-15 VITALS — DIASTOLIC BLOOD PRESSURE: 75 MMHG | SYSTOLIC BLOOD PRESSURE: 136 MMHG

## 2017-06-15 DIAGNOSIS — L97.912 ULCER OF RIGHT LOWER LEG, WITH FAT LAYER EXPOSED (H): Primary | ICD-10-CM

## 2017-06-15 DIAGNOSIS — E11.49 DIABETIC NEUROPATHY WITH NEUROLOGIC COMPLICATION (H): ICD-10-CM

## 2017-06-15 DIAGNOSIS — E11.40 DIABETIC NEUROPATHY WITH NEUROLOGIC COMPLICATION (H): ICD-10-CM

## 2017-06-15 DIAGNOSIS — I87.8 VENOUS STASIS: ICD-10-CM

## 2017-06-15 PROCEDURE — 97597 DBRDMT OPN WND 1ST 20 CM/<: CPT | Performed by: PODIATRIST

## 2017-06-15 ASSESSMENT — PAIN SCALES - GENERAL: PAINLEVEL: NO PAIN (0)

## 2017-06-15 NOTE — PATIENT INSTRUCTIONS
Thanks for coming today.  [facility]  [address] [place] [address]    To schedule future appointments in Ortho Clinic, you may call [phone].    To schedule ordered imaging by your provider:   Call Central Imaging [phone]    To schedule an injection ordered by your provider:  Call Central Imaging Injection scheduling line: [phone]  [email or web address] available online at:  [email or web address]    Please call if any further questions or concerns ([phone]).  Clinic hours 8 am to 5 pm.    Return to clinic (call) if symptoms worsen or fail to improve.

## 2017-06-15 NOTE — MR AVS SNAPSHOT
After Visit Summary   6/15/2017    Amos Walker    MRN: [id number]           Patient Information     Date Of Birth          1945        Visit Information        Provider Department      6/15/2017 10:00 AM Brayan Mcclain DPM [facility]        Today's Diagnoses     Ulcer of right lower leg, with fat layer exposed (H)    -  1    Venous stasis        Diabetic neuropathy with neurologic complication (H)          Care Instructions    Thanks for coming today.  [facility]  [address] [place] [id number]    To schedule future appointments in Ortho Clinic, you may call [phone].    To schedule ordered imaging by your provider:   Call Central Imaging [phone]    To schedule an injection ordered by your provider:  Call Central Imaging Injection scheduling line: [phone]  [email or web address] available online at:  [email or web address]    Please call if any further questions or concerns ([phone]).  Clinic hours 8 am to 5 pm.    Return to clinic (call) if symptoms worsen or fail to improve.            Follow-ups after your visit        Your next 10 appointments already scheduled     2017 10:55 AM CDT   (Arrive by 10:40 AM)   Return Visit with Racheal Swift MD   [facility] ([facility] [place])    [address] [facility]  [address] [facility] [address] [phone]            2017  9:45 AM CDT   Return Visit with Brayan Mcclain DPM   [facility]    [address] [place] [address] [phone]            2017 10:00 AM CDT   (Arrive by 9:45 AM)   New Patient Visit with Sinai Hoover MD   [facility] ([facility])    [address] [address] [phone]              Who to contact     If you have  questions or need follow up information about today's clinic visit or your schedule please contact [facility] directly at [phone].  Normal or non-critical lab and imaging results will be communicated to you by FIGShart, letter or phone within 4 business days after the clinic has received the results. If you do not hear from us within 7 days, please contact the clinic through FIGShart or phone. If you have a critical or abnormal lab result, we will notify you by phone as soon as possible.  Submit refill requests through Percolate or call your pharmacy and they will forward the refill request to us. Please allow 3 business days for your refill to be completed.          Additional Information About Your Visit        Percolate Information     Percolate gives you secure access to your electronic health record. If you see a primary care provider, you can also send messages to your care team and make appointments. If you have questions, please call your primary care clinic.  If you do not have a primary care provider, please call [phone] and they will assist you.      Percolate is an electronic gateway that provides easy, online access to your medical records. With Percolate, you can request a clinic appointment, read your test results, renew a prescription or communicate with your care team.     To access your existing account, please contact your [facility] or call [phone] for assistance.        Care EveryWhere ID     This is your Care EveryWhere ID. This could be used by other organizations to access your [facility] medical records  [id number]         Blood Pressure from Last 3 Encounters:   06/15/17 136/75   06/09/17 125/67   06/05/17 117/68    Weight from Last 3 Encounters:   02/06/17 84.7 kg (186 lb 12.8 oz)   10/31/16 87.8 kg (193 lb 8 oz)   06/27/16 85 kg (187 lb 6.4 oz)              We Performed the Following     DEBRIDEMENT WOUND UP TO 20 SQ CM        Primary  Care Provider Office Phone # Fax #    Silvina Patiño [phone]       [address] [address]        Thank you!     Thank you for choosing [facility]  for your care. Our goal is always to provide you with excellent care. Hearing back from our patients is one way we can continue to improve our services. Please take a few minutes to complete the written survey that you may receive in the mail after your visit with us. Thank you!             Your Updated Medication List - Protect others around you: Learn how to safely use, store and throw away your medicines at www.disposemymeds.org.          This list is accurate as of: 6/15/17  2:58 PM.  Always use your most recent med list.                   Brand Name Dispense Instructions for use    * ammonium lactate 12 % cream    AMLACTIN    300 g    Apply twice daily to legs       * ammonium lactate 12 % cream    LAC-HYDRIN    385 g    Apply topically 2 times daily as needed for dry skin       ascorbic acid 1000 MG Tabs    vitamin C     Take 1,000 mg by mouth daily       aspirin 81 MG chewable tablet      Take 81 mg by mouth daily       blood glucose monitoring lancets     3 Box    Use to test blood sugars 2 as directed.       blood glucose monitoring test strip    ONE TOUCH ULTRA    60 strip    Use to test blood sugars 2 times daily or as directed.       ciprofloxacin-dexamethasone otic suspension    CIPRODEX    1 Bottle    Place 4 drops into both ears 2 times daily       clindamycin 300 MG capsule    CLEOCIN    30 capsule    Take 1 capsule (300 mg) by mouth 3 times daily       clopidogrel 75 MG tablet    PLAVIX    30 tablet    Take 1 tablet (75 mg) by mouth daily       * econazole nitrate 1 % cream     85 g    Apply topically 2 times daily       * econazole nitrate 1 % cream     85 g    Apply topically 2 times daily       ferrous sulfate 325 (65 FE) MG tablet    IRON    60 tablet    Take 1 tablet  "(325 mg) by mouth 2 times daily       * gentamicin 0.1 % ointment    GARAMYCIN    30 g    Apply topically daily To right leg ulcer daily.       * gentamicin 0.1 % cream    GARAMYCIN    30 g    Apply topically 2 times daily To right leg ulcer.       hydrochlorothiazide 12.5 MG Tabs tablet     90 tablet    Take 1 tablet (12.5 mg) by mouth daily       hydrocortisone 0.2 % cream    WESTCORT    15 g    Apply sparingly to affected area twice times daily for 14 days.       insulin glargine 100 UNIT/ML injection    LANTUS SOLOSTAR    9 mL    Inject 25 Units Subcutaneous At Bedtime       insulin pen needle 31G X 8 MM    B-D U/F    100 each    Use 1 daily o as directed       * ketoconazole 2 % cream    NIZORAL    60 g    Apply topically 2 times daily       * ketoconazole 2 % cream    NIZORAL    60 g    Apply topically 2 times daily To toenails.       * levofloxacin 750 MG tablet    LEVAQUIN    14 tablet    Take 1 tablet (750 mg) by mouth daily       * levofloxacin 750 MG tablet    LEVAQUIN    14 tablet    Take 1 tablet (750 mg) by mouth daily       lisinopril 40 MG tablet    PRINIVIL/ZESTRIL    90 tablet    Take 1 tablet (40 mg) by mouth daily       mupirocin 2 % cream    BACTROBAN    30 g    Apply topically 2 times daily To right leg ulcer.       mupirocin 2 % ointment    BACTROBAN    22 g    Apply topically daily       nateglinide 120 MG tablet    STARLIX    90 tablet    TAKE 1 TABLET BY MOUTH THREE TIMES DAILY BEFORE MEALS       nystatin Powd     60 g    Apply twice daily to feet       nystatin-triamcinolone cream    MYCOLOG II    60 g    Apply topically 2 times daily       OPTIFOAM 6\"X6\" Pads     1 each    1 Box once a week       * order for DME     2 each    Please measure and distribute 1 pair of 20mm Hg - 30mm Hg knee high ULCER CARE open or closed toe compression stockings.  Patient has a size 13 foot and please take this into consideration.  Jobst or equivalent       * order for DME     3 each    Please measure and " distribute 1 pair of 20mmHg - 30mmHg knee high open or closed toe compression stockings. Jobst ultrasheer or equivalent.       * order for DME     2 each    Please measure and distribute 1 pair of 30mmHg - 40mmHg knee high open toe ulcercare compression stockings. Jobst ultrasheer or equivalent.       oxyCODONE 5 MG IR tablet    ROXICODONE    20 tablet    Take 1 tablet (5 mg) by mouth every 4 hours as needed for moderate to severe pain       sildenafil 50 MG cap/tab    REVATIO/VIAGRA    10 tablet    Take 1 tablet (50 mg) by mouth daily as needed for erectile dysfunction       simvastatin 10 MG tablet    ZOCOR    90 tablet    Take 1 tablet (10 mg) by mouth At Bedtime       sitagliptin 100 MG tablet    JANUVIA    90 tablet    Take 1 tablet (100 mg) by mouth daily       SSD 1 % cream   Generic drug:  silver sulfADIAZINE          triamcinolone 0.1 % cream    KENALOG    453.6 g    Apply sparingly daily to affected areas on feet and legs.       VITAMIN D3 PO      Take by mouth daily       * Notice:  This list has 13 medication(s) that are the same as other medications prescribed for you. Read the directions carefully, and ask your doctor or other care provider to review them with you.

## 2017-06-15 NOTE — NURSING NOTE
"Amos Walker's goals for this visit include: Recheck anterior leg ulcer  He requests these members of his care team be copied on today's visit information: yes    PCP: Silvina Patiño    Referring Provider:  No referring provider defined for this encounter.    Chief Complaint   Patient presents with     RECHECK     Right anterior leg ulcer       Initial /75 Estimated body mass index is 23.67 kg/(m^2) as calculated from the following:    Height as of 6/2/15: 1.892 m (6' 2.49\").    Weight as of 2/6/17: 84.7 kg (186 lb 12.8 oz).  Medication Reconciliation: complete    "

## 2017-06-16 ENCOUNTER — OFFICE VISIT (OUTPATIENT)
Dept: INTERNAL MEDICINE | Facility: CLINIC | Age: 72
End: 2017-06-16

## 2017-06-16 VITALS
WEIGHT: 181.8 LBS | DIASTOLIC BLOOD PRESSURE: 69 MMHG | SYSTOLIC BLOOD PRESSURE: 122 MMHG | HEART RATE: 96 BPM | BODY MASS INDEX: 23.04 KG/M2

## 2017-06-16 DIAGNOSIS — E11.42 TYPE 2 DIABETES MELLITUS WITH DIABETIC POLYNEUROPATHY, WITH LONG-TERM CURRENT USE OF INSULIN (H): ICD-10-CM

## 2017-06-16 DIAGNOSIS — E11.42 TYPE 2 DIABETES MELLITUS WITH DIABETIC POLYNEUROPATHY, WITH LONG-TERM CURRENT USE OF INSULIN (H): Primary | ICD-10-CM

## 2017-06-16 DIAGNOSIS — H60.502 ACUTE OTITIS EXTERNA OF LEFT EAR, UNSPECIFIED TYPE: ICD-10-CM

## 2017-06-16 DIAGNOSIS — Z79.4 TYPE 2 DIABETES MELLITUS WITH DIABETIC POLYNEUROPATHY, WITH LONG-TERM CURRENT USE OF INSULIN (H): ICD-10-CM

## 2017-06-16 DIAGNOSIS — Z79.4 TYPE 2 DIABETES MELLITUS WITH DIABETIC POLYNEUROPATHY, WITH LONG-TERM CURRENT USE OF INSULIN (H): Primary | ICD-10-CM

## 2017-06-16 LAB — HBA1C MFR BLD: 6.7 % (ref 4.3–6)

## 2017-06-16 RX ORDER — CIPROFLOXACIN AND DEXAMETHASONE 3; 1 MG/ML; MG/ML
4 SUSPENSION/ DROPS AURICULAR (OTIC) 2 TIMES DAILY
Qty: 7.5 ML | Refills: 0 | Status: SHIPPED | OUTPATIENT
Start: 2017-06-16 | End: 2017-06-23

## 2017-06-16 ASSESSMENT — PAIN SCALES - GENERAL: PAINLEVEL: MODERATE PAIN (4)

## 2017-06-16 NOTE — MR AVS SNAPSHOT
After Visit Summary   6/16/2017    Amos Walker    MRN: [id number]           Patient Information     Date Of Birth          1945        Visit Information        Provider Department      6/16/2017 10:55 AM Racheal Swift MD [facility]        Today's Diagnoses     Type 2 diabetes mellitus with diabetic polyneuropathy, with long-term current use of insulin (H)    -  1    Acute otitis externa of left ear, unspecified type          Care Instructions    Primary Care Center Medication Refill Request Information:  * Please contact your pharmacy regarding ANY request for medication refills.  ** [facility] Prescription Fax = [phone]  * Please allow 3 business days for routine medication refills.  * Please allow 5 business days for controlled substance medication refills.     Primary Care Center Test Result notification information:  *You will be notified with in 7-10 days of your appointment day regarding the results of your test.  If you are on MyChart you will be notified as soon as the provider has reviewed the results and signed off on them.    Primary Care Center [phone]       Check blood sugars in Afternoon          Follow-ups after your visit        Follow-up notes from your care team     Return in about 3 months (around 9/16/2017) for Routine Visit.      Your next 10 appointments already scheduled     Jun 23, 2017  9:45 AM CDT   Return [facility] with Brayan Mcclain DPM   [facility]    [address] [place] [id number] [phone]            Jun 29, 2017 10:00 AM CDT   (Arrive by 9:45 AM)   New Patient Visit with Sinai Hoover MD   [facility] ([facility])    [address] [place] [id number] [phone]              Future tests that were ordered for you today     Open Future Orders        Priority  Expected Expires Ordered    Hemoglobin A1c Routine 6/16/2017 6/30/2017 6/16/2017            Who to contact     Please call your clinic at [phone] to:    Ask questions about your health    Make or cancel appointments    Discuss your medicines    Learn about your test results    Speak to your doctor   If you have compliments or concerns about an experience at your clinic, or if you wish to file a complaint, please contact [facility] Patient Relations at [phone] or email us at [email or web address]         Additional Information About Your Visit        [employer] Information     [employer] gives you secure access to your electronic health record. If you see a primary care provider, you can also send messages to your care team and make appointments. If you have questions, please call your primary care clinic.  If you do not have a primary care provider, please call [phone] and they will assist you.      [employer] is an electronic gateway that provides easy, online access to your medical records. With [employer], you can request a clinic appointment, read your test results, renew a prescription or communicate with your care team.     To access your existing account, please contact your [facility] or call [phone] for assistance.        Care EveryWhere ID     This is your Care EveryWhere ID. This could be used by other organizations to access your [facility] medical records  [id number]        Your Vitals Were     Pulse BMI (Body Mass Index)                96 23.04 kg/m2           Blood Pressure from Last 3 Encounters:   06/16/17 122/69   06/15/17 136/75   06/09/17 125/67    Weight from Last 3 Encounters:   06/16/17 82.5 kg (181 lb 12.8 oz)   02/06/17 84.7 kg (186 lb 12.8 oz)   10/31/16 87.8 kg (193 lb 8 oz)                 Today's Medication Changes          These changes are accurate as of: 6/16/17  1:10 PM.  If you have any questions, ask your  nurse or doctor.               These medicines have changed or have updated prescriptions.        Dose/Directions    * ciprofloxacin-dexamethasone otic suspension   Commonly known as:  CIPRODEX   This may have changed:  Another medication with the same name was added. Make sure you understand how and when to take each.   Used for:  Otitis externa   Changed by:  Silvina Patiño MD        Dose:  4 drop   Place 4 drops into both ears 2 times daily   Quantity:  1 Bottle   Refills:  0       * ciprofloxacin-dexamethasone otic suspension   Commonly known as:  CIPRODEX   This may have changed:  You were already taking a medication with the same name, and this prescription was added. Make sure you understand how and when to take each.   Used for:  Acute otitis externa of left ear, unspecified type   Changed by:  Racheal Swift MD        Dose:  4 drop   Place 4 drops into both ears 2 times daily for 7 days   Quantity:  7.5 mL   Refills:  0       * Notice:  This list has 2 medication(s) that are the same as other medications prescribed for you. Read the directions carefully, and ask your doctor or other care provider to review them with you.         Where to get your medicines      These medications were sent to [facility] [id number] [place] [address] [address] AT [place] [address]  [facility], [facility] [address]     Phone:  [phone]     ciprofloxacin-dexamethasone otic suspension                Primary Care Provider Office Phone # Fax #    Silvina Patiño [phone]       [address] [address]        Thank you!     Thank you for choosing [facility]  for your care. Our goal is always to provide you with excellent care. Hearing back from our patients is one way we can continue to improve our services. Please take a few minutes to complete the written survey that you may receive in the mail after your visit  with us. Thank you!             Your Updated Medication List - Protect others around you: Learn how to safely use, store and throw away your medicines at www.disposemymeds.org.          This list is accurate as of: 6/16/17  1:10 PM.  Always use your most recent med list.                   Brand Name Dispense Instructions for use    * ammonium lactate 12 % cream    AMLACTIN    300 g    Apply twice daily to legs       * ammonium lactate 12 % cream    LAC-HYDRIN    385 g    Apply topically 2 times daily as needed for dry skin       ascorbic acid 1000 MG Tabs    vitamin C     Take 1,000 mg by mouth daily       aspirin 81 MG chewable tablet      Take 81 mg by mouth daily       blood glucose monitoring lancets     3 Box    Use to test blood sugars 2 as directed.       blood glucose monitoring test strip    ONE TOUCH ULTRA    60 strip    Use to test blood sugars 2 times daily or as directed.       * ciprofloxacin-dexamethasone otic suspension    CIPRODEX    1 Bottle    Place 4 drops into both ears 2 times daily       * ciprofloxacin-dexamethasone otic suspension    CIPRODEX    7.5 mL    Place 4 drops into both ears 2 times daily for 7 days       clindamycin 300 MG capsule    CLEOCIN    30 capsule    Take 1 capsule (300 mg) by mouth 3 times daily       clopidogrel 75 MG tablet    PLAVIX    30 tablet    Take 1 tablet (75 mg) by mouth daily       * econazole nitrate 1 % cream     85 g    Apply topically 2 times daily       * econazole nitrate 1 % cream     85 g    Apply topically 2 times daily       ferrous sulfate 325 (65 FE) MG tablet    IRON    60 tablet    Take 1 tablet (325 mg) by mouth 2 times daily       * gentamicin 0.1 % ointment    GARAMYCIN    30 g    Apply topically daily To right leg ulcer daily.       * gentamicin 0.1 % cream    GARAMYCIN    30 g    Apply topically 2 times daily To right leg ulcer.       hydrochlorothiazide 12.5 MG Tabs tablet     90 tablet    Take 1 tablet (12.5 mg) by mouth daily        "hydrocortisone 0.2 % cream    WESTCORT    15 g    Apply sparingly to affected area twice times daily for 14 days.       insulin glargine 100 UNIT/ML injection    LANTUS SOLOSTAR    9 mL    Inject 25 Units Subcutaneous At Bedtime       insulin pen needle 31G X 8 MM    B-D U/F    100 each    Use 1 daily o as directed       * ketoconazole 2 % cream    NIZORAL    60 g    Apply topically 2 times daily       * ketoconazole 2 % cream    NIZORAL    60 g    Apply topically 2 times daily To toenails.       * levofloxacin 750 MG tablet    LEVAQUIN    14 tablet    Take 1 tablet (750 mg) by mouth daily       * levofloxacin 750 MG tablet    LEVAQUIN    14 tablet    Take 1 tablet (750 mg) by mouth daily       lisinopril 40 MG tablet    PRINIVIL/ZESTRIL    90 tablet    Take 1 tablet (40 mg) by mouth daily       mupirocin 2 % cream    BACTROBAN    30 g    Apply topically 2 times daily To right leg ulcer.       mupirocin 2 % ointment    BACTROBAN    22 g    Apply topically daily       nateglinide 120 MG tablet    STARLIX    90 tablet    TAKE 1 TABLET BY MOUTH THREE TIMES DAILY BEFORE MEALS       nystatin Powd     60 g    Apply twice daily to feet       nystatin-triamcinolone cream    MYCOLOG II    60 g    Apply topically 2 times daily       OPTIFOAM 6\"X6\" Pads     1 each    1 Box once a week       * order for DME     2 each    Please measure and distribute 1 pair of 20mm Hg - 30mm Hg knee high ULCER CARE open or closed toe compression stockings.  Patient has a size 13 foot and please take this into consideration.  Jobst or equivalent       * order for DME     3 each    Please measure and distribute 1 pair of 20mmHg - 30mmHg knee high open or closed toe compression stockings. Jobst ultrasheer or equivalent.       * order for DME     2 each    Please measure and distribute 1 pair of 30mmHg - 40mmHg knee high open toe ulcercare compression stockings. Jobst ultrasheer or equivalent.       oxyCODONE 5 MG IR tablet    ROXICODONE    20 " tablet    Take 1 tablet (5 mg) by mouth every 4 hours as needed for moderate to severe pain       sildenafil 50 MG cap/tab    REVATIO/VIAGRA    10 tablet    Take 1 tablet (50 mg) by mouth daily as needed for erectile dysfunction       simvastatin 10 MG tablet    ZOCOR    90 tablet    Take 1 tablet (10 mg) by mouth At Bedtime       sitagliptin 100 MG tablet    JANUVIA    90 tablet    Take 1 tablet (100 mg) by mouth daily       SSD 1 % cream   Generic drug:  silver sulfADIAZINE          triamcinolone 0.1 % cream    KENALOG    453.6 g    Apply sparingly daily to affected areas on feet and legs.       VITAMIN D3 PO      Take by mouth daily       * Notice:  This list has 15 medication(s) that are the same as other medications prescribed for you. Read the directions carefully, and ask your doctor or other care provider to review them with you.

## 2017-06-16 NOTE — PROGRESS NOTES
Chief complaint:  Amos Walker is a 72 year old male presents for   Chief Complaint   Patient presents with     Diabetes     pt here for diabetes check        SUBJECTIVE:  Patient notes some low back pain that travels to the backs of his thighs. Describes achy, 4/10 pain that improves throughout the day and if he sits with good posture.     Mr. Walker says he took the last of his antibiotics last night for a right ankle wound infection. He saw podiatry yesterday and says they told him his infection was resolved. The patient has another follow up with podiatry next week.     Patient recently seen in opthalmology and notes he has no signs of diabetic retinopathy but does have some issues with cataracts that is stable but will require follow up in a year.     Mr. Walker has not taken his blood sugar readings regularily but notes his before breakfast readings for the past three days were 85, 65 and 85 and that he took one before dinner reading that was 140. He denies symptoms of hypoglycemia.     The patient states he occasionally feel lightheaded in the mornings and thinks this is related to his blood pressure. He has been taking his HCTZ every second day and feels this is helping.     Finally, Mr. Walker notes itching in his external ear canals. He states he scratches using a q-tip but notices blood occasionally and has pain after doing so. The patient wears hearing aids.     Medications and allergies were reviewed and updated in the chart.     SocHx:   History   Smoking Status     Current Every Day Smoker     Packs/day: 0.50     Years: 50.00     Types: Cigarettes   Smokeless Tobacco     Never Used     Comment: heavier smoker in the past       Family history and PMH reviewed and updated in chart    Patient Active Problem List    Diagnosis Date Noted     Diabetes mellitus with peripheral vascular disease (H) 02/21/2017     Priority: Medium     Type 2 diabetes, controlled, with neuropathy (H) 03/21/2016     Priority:  Medium     CKD (chronic kidney disease) stage 3, GFR 30-59 ml/min 12/21/2015     Priority: Medium     HTN (hypertension) 06/26/2015     PVD (peripheral vascular disease) (H) 06/18/2015     Senile nuclear sclerosis 12/11/2014       ROS   General: See HPI  Respiratory: dry cough, denies SOB  Cardiovascular: Denies chest pain, palpitations  GI: Denies abdominal pain, black or bloody stool    /69  Pulse 96  Wt 82.5 kg (181 lb 12.8 oz)  BMI 23.04 kg/m2  Physical Exam  Eyes: PERRLA, EOMI  Ears: external canal is erythematous with inflamed follicles  Respiratory: Breath sounds present bilaterally. No wheezes, rales or rhonchi  Cardiovascular: RRR. No murmurs or rubs.  Abdomen: BS present. No tenderness to palpation  Neuro: 5/5 strength bilaterally for shoulder abduction, elbow ext/flexion, leg ext/flex. Reflexes: 1+ patellar bilaterally.     ASSESSMENT/PLAN:  Type 2 diabetes mellitus with diabetic polyneuropathy, with long-term current use of insulin (H)  Patient has not been taking regular readings but notes morning readings over past three days of 85, 65 and 85, and one pre-dinner BS of 140. Discussed taking more measurements during the day and adjusting meds as needed next visit. Patient's last A1c was 6.3% in 2/17 but he would like it rechecked today  - Hemoglobin A1c    Acute otitis externa of left ear, unspecified type  Patient notes itchy external ear canals that appear erythematous with inflamed follicles on exam, bilaterally. Will provide antibiotic/steroid drops to treat potential infection and reduce inflammation.   - ciprofloxacin-dexamethasone (CIPRODEX) otic suspension  Dispense: 7.5 mL; Refill: 0    Back Pain  Patient notes 4/10 achy low back pain that's worse in the mornings and improved with posture adjustments. Discussed PT but patient would like to discuss at the next visit.    HTN  Patient noting some lightheadedness in the mornings when he takes his HCTZ. Blood pressure is 122/69 today.  patient has been taking HCTZ every other day and would like to continue doing so.    -Continue HCTZ 12.5 mg every other day  -Continue lisinopril 40 mg qday    HM:  Patient due for colonoscopy but wants to defer to next visit.  Briefly discussed PSA screening and pt elects to defer for now.    RTC:   Return in 3 months for follow up      Scribe Disclosure:   I, Candida Suggs, am serving as a scribe; to document services personally performed by Dr. Swift- -based on data collection and the provider's statements to me.     Provider Disclosure:  I agree with above History, Review of Systems, Physical exam and Plan.  I have reviewed the content of the documentation and have edited it as needed. I have personally performed the services documented here and the documentation accurately represents those services and the decisions I have made.      Racheal Swift MD  Internal Medicine

## 2017-06-23 ENCOUNTER — OFFICE VISIT (OUTPATIENT)
Dept: PODIATRY | Facility: CLINIC | Age: 72
End: 2017-06-23
Payer: MEDICARE

## 2017-06-23 ENCOUNTER — TELEPHONE (OUTPATIENT)
Dept: NURSING | Facility: CLINIC | Age: 72
End: 2017-06-23

## 2017-06-23 VITALS — DIASTOLIC BLOOD PRESSURE: 73 MMHG | SYSTOLIC BLOOD PRESSURE: 137 MMHG

## 2017-06-23 DIAGNOSIS — E11.40 DIABETIC NEUROPATHY WITH NEUROLOGIC COMPLICATION (H): ICD-10-CM

## 2017-06-23 DIAGNOSIS — L97.912 ULCER OF LEG, CHRONIC, RIGHT, WITH FAT LAYER EXPOSED (H): Primary | ICD-10-CM

## 2017-06-23 DIAGNOSIS — E11.51 DIABETES MELLITUS WITH PERIPHERAL VASCULAR DISEASE (H): ICD-10-CM

## 2017-06-23 DIAGNOSIS — E11.49 DIABETIC NEUROPATHY WITH NEUROLOGIC COMPLICATION (H): ICD-10-CM

## 2017-06-23 PROCEDURE — 99213 OFFICE O/P EST LOW 20 MIN: CPT | Performed by: PODIATRIST

## 2017-06-23 ASSESSMENT — PAIN SCALES - GENERAL: PAINLEVEL: NO PAIN (0)

## 2017-06-23 NOTE — NURSING NOTE
"Amos Walker's goals for this visit include: Recheck right leg wound  He requests these members of his care team be copied on today's visit information: yes    PCP: Racheal Swift    Referring Provider:  ESTABLISHED PATIENT  No address on file    Chief Complaint   Patient presents with     RECHECK     Right leg ulcer       Initial /73 Estimated body mass index is 23.04 kg/(m^2) as calculated from the following:    Height as of 6/2/15: 1.892 m (6' 2.49\").    Weight as of 6/16/17: 82.5 kg (181 lb 12.8 oz).  Medication Reconciliation: complete    "

## 2017-06-23 NOTE — TELEPHONE ENCOUNTER
Financial Counselor Review for Apligraf, Dermagraft or Puraply AM:    Procedure to be performed (include CPT code):Yes Apilgraf    Diagnosis code (include ICD-10 code): L97.912    Coverage and patient financial responsibility information:YES    Does patient need to be contacted by Financial Counselor:YES    Has the patient tried Apligraf, Dermagraft or Puraply before    Note: Do not use abbreviations and route encounter to.
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
## 2017-06-23 NOTE — PATIENT INSTRUCTIONS
Thanks for coming today.  [facility]  [address] [place] [address]    To schedule future appointments in Ortho Clinic, you may call [phone].    To schedule ordered imaging by your provider:   Call Central [facility] [phone]    To schedule an injection ordered by your provider:  Call [facility] Injection scheduling line: [phone]  [employer] available online at:  [email or web address]    Please call if any further questions or concerns ([phone]).  Clinic hours 8 am to 5 pm.    Return to clinic (call) if symptoms worsen or fail to improve.

## 2017-06-29 ENCOUNTER — OFFICE VISIT (OUTPATIENT)
Dept: INFECTIOUS DISEASES | Facility: CLINIC | Age: 72
End: 2017-06-29
Attending: INTERNAL MEDICINE
Payer: MEDICARE

## 2017-06-29 VITALS
SYSTOLIC BLOOD PRESSURE: 118 MMHG | OXYGEN SATURATION: 97 % | BODY MASS INDEX: 22.83 KG/M2 | WEIGHT: 183.6 LBS | HEART RATE: 87 BPM | HEIGHT: 75 IN | DIASTOLIC BLOOD PRESSURE: 72 MMHG | TEMPERATURE: 98.1 F

## 2017-06-29 DIAGNOSIS — I87.8 VENOUS STASIS: ICD-10-CM

## 2017-06-29 DIAGNOSIS — E11.49 DIABETIC NEUROPATHY WITH NEUROLOGIC COMPLICATION (H): ICD-10-CM

## 2017-06-29 DIAGNOSIS — E11.40 DIABETIC NEUROPATHY WITH NEUROLOGIC COMPLICATION (H): ICD-10-CM

## 2017-06-29 DIAGNOSIS — L97.912 ULCER OF RIGHT LOWER LEG, WITH FAT LAYER EXPOSED (H): ICD-10-CM

## 2017-06-29 LAB — CRP SERPL-MCNC: <2.9 MG/L (ref 0–8)

## 2017-06-29 PROCEDURE — 87070 CULTURE OTHR SPECIMN AEROBIC: CPT | Performed by: INTERNAL MEDICINE

## 2017-06-29 PROCEDURE — 36415 COLL VENOUS BLD VENIPUNCTURE: CPT | Performed by: INTERNAL MEDICINE

## 2017-06-29 PROCEDURE — 86140 C-REACTIVE PROTEIN: CPT | Performed by: INTERNAL MEDICINE

## 2017-06-29 PROCEDURE — 99215 OFFICE O/P EST HI 40 MIN: CPT | Mod: ZF

## 2017-06-29 ASSESSMENT — PAIN SCALES - GENERAL: PAINLEVEL: NO PAIN (0)

## 2017-06-29 NOTE — NURSING NOTE
Chief Complaint   Patient presents with     Consult     Right lower leg ulcer   Pt roomed, vitals, meds, and allergies reviewed with pt. Pt ready for provider.  Anam Purdy, CMA
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
## 2017-06-29 NOTE — MR AVS SNAPSHOT
After Visit Summary   6/29/2017    Amos Walker    MRN: [id number]           Patient Information     Date Of Birth          1945        Visit Information        Provider Department      6/29/2017 10:00 AM Sinai Hoover MD [facility]        Today's Diagnoses     Ulcer of right lower leg, with fat layer exposed (H)        Diabetic neuropathy with neurologic complication (H)        Venous stasis           Follow-ups after your visit        Follow-up notes from your care team     Return in 1 week (on 7/6/2017).      Your next 10 appointments already scheduled     Jul 06, 2017 11:30 AM CDT   (Arrive by 11:15 AM)   Return Visit with Sinai Hoover MD   [facility] ([facility])    [address]  3rd Floor  [facility] [id number] [phone]            Jul 14, 2017 10:00 AM CDT   Return Visit with Brayan Mcclain DPM   [facility] ([facility])    [address] [place] [id number]   [phone]            Sep 18, 2017 10:30 AM CDT   (Arrive by 10:15 AM)   Return Visit with Racheal Swift MD   [facility] ([facility])    [address]  4th Floor  [facility] [id number] [phone]            Jun 20, 2018 10:30 AM CDT   RETURN RETINA with Jen Aranda MD   [facility] ([facility])    [address]  [address] [facility] [address]  [facility] [id number] [phone]              Who to contact     If you have questions or need follow up information about today's clinic visit or your schedule please contact [facility] directly at [phone].  Normal or non-critical lab and imaging results will be communicated to you by MyChart, letter or phone within 4 business days after the  "clinic has received the results. If you do not hear from us within 7 days, please contact the clinic through [facility]ault or phone. If you have a critical or abnormal lab result, we will notify you by phone as soon as possible.  Submit refill requests through [facility] or call your pharmacy and they will forward the refill request to us. Please allow 3 business days for your refill to be completed.          Additional Information About Your Visit        EVault Information     [facility] gives you secure access to your electronic health record. If you see a primary care provider, you can also send messages to your care team and make appointments. If you have questions, please call your primary care clinic.  If you do not have a primary care provider, please call [phone] and they will assist you.        Care EveryWhere ID     This is your Care EveryWhere ID. This could be used by other organizations to access your [facility] medical records  [id number]        Your Vitals Were     Pulse Temperature Height Pulse Oximetry BMI (Body Mass Index)       87 98.1  F (36.7  C) (Oral) 1.892 m (6' 2.5\") 97% 23.26 kg/m2        Blood Pressure from Last 3 Encounters:   06/29/17 118/72   06/23/17 137/73   06/16/17 122/69    Weight from Last 3 Encounters:   06/29/17 83.3 kg (183 lb 9.6 oz)   06/16/17 82.5 kg (181 lb 12.8 oz)   02/06/17 84.7 kg (186 lb 12.8 oz)              We Performed the Following     Wound Culture Aerobic Bacterial          Today's Medication Changes          These changes are accurate as of: 6/29/17 11:59 PM.  If you have any questions, ask your nurse or doctor.               These medicines have changed or have updated prescriptions.        Dose/Directions    hydrochlorothiazide 12.5 MG Tabs tablet   This may have changed:  when to take this   Used for:  Benign essential hypertension        Dose:  12.5 mg   Take 1 tablet (12.5 mg) by mouth daily   Quantity:  90 tablet   Refills:  3                Primary Care " Provider Office Phone # Fax #    Racheal Swift [phone]       [address]  [facility] [address]        Equal Access to Services     SAMSON MELTON : Hadii aad ku hadjeannieyomaira Somaameali, waaxda luqadaha, qaybta kaalmada adedamionyada, yolanda mankristie cabellodamion blair renee lopez. So [facility] [phone].    ATENCIÓN: Si habla español, tiene a rodrigues disposición servicios gratuitos de asistencia lingüística. Llame al [phone].    We comply with applicable federal civil rights laws and [place] laws. We do not discriminate on the basis of race, color, national origin, age, disability sex, sexual orientation or gender identity.            Thank you!     Thank you for choosing [facility]  for your care. Our goal is always to provide you with excellent care. Hearing back from our patients is one way we can continue to improve our services. Please take a few minutes to complete the written survey that you may receive in the mail after your visit with us. Thank you!             Your Updated Medication List - Protect others around you: Learn how to safely use, store and throw away your medicines at www.disposemymeds.org.          This list is accurate as of: 6/29/17 11:59 PM.  Always use your most recent med list.                   Brand Name Dispense Instructions for use Diagnosis    * ammonium lactate 12 % cream    AMLACTIN    300 g    Apply twice daily to legs    Dermatitis       * ammonium lactate 12 % cream    LAC-HYDRIN    385 g    Apply topically 2 times daily as needed for dry skin    Diabetic neuropathy with neurologic complication (H), Venous stasis       ascorbic acid 1000 MG Tabs    vitamin C     Take 1,000 mg by mouth daily        aspirin 81 MG chewable tablet      Take 81 mg by mouth daily        blood glucose monitoring lancets     3 Box    Use to test blood sugars 2 as directed.    Type 2 diabetes, uncontrolled, with neuropathy (H)        blood glucose monitoring test strip    ONE TOUCH ULTRA    60 strip    Use to test blood sugars 2 times daily or as directed.    Type 2 diabetes mellitus (H)       ciprofloxacin-dexamethasone otic suspension    CIPRODEX    1 Bottle    Place 4 drops into both ears 2 times daily    Otitis externa       clindamycin 300 MG capsule    CLEOCIN    30 capsule    Take 1 capsule (300 mg) by mouth 3 times daily    Ulcer of right leg, with fat layer exposed (H), Venous stasis       clopidogrel 75 MG tablet    PLAVIX    30 tablet    Take 1 tablet (75 mg) by mouth daily    Peripheral vascular disease, unspecified (H)       * econazole nitrate 1 % cream     85 g    Apply topically 2 times daily        * econazole nitrate 1 % cream     85 g    Apply topically 2 times daily    Unspecified venous (peripheral) insufficiency       ferrous sulfate 325 (65 FE) MG tablet    IRON    60 tablet    Take 1 tablet (325 mg) by mouth 2 times daily    Peripheral vascular disease, unspecified (H)       * gentamicin 0.1 % ointment    GARAMYCIN    30 g    Apply topically daily To right leg ulcer daily.        * gentamicin 0.1 % cream    GARAMYCIN    30 g    Apply topically 2 times daily To right leg ulcer.    Ulcer of right lower leg, with fat layer exposed (H), Venous stasis       hydrochlorothiazide 12.5 MG Tabs tablet     90 tablet    Take 1 tablet (12.5 mg) by mouth daily    Benign essential hypertension       hydrocortisone 0.2 % cream    WESTCORT    15 g    Apply sparingly to affected area twice times daily for 14 days.    Dermatitis       insulin glargine 100 UNIT/ML injection    LANTUS SOLOSTAR    9 mL    Inject 25 Units Subcutaneous At Bedtime    Type 2 diabetes mellitus with diabetic peripheral angiopathy without gangrene, with long-term current use of insulin (H)       insulin pen needle 31G X 8 MM    B-D U/F    100 each    Use 1 daily o as directed    Diabetes mellitus, type II (H)       * ketoconazole 2 % cream    NIZORAL    60 g     "Apply topically 2 times daily    Tinea pedis of both feet       * ketoconazole 2 % cream    NIZORAL    60 g    Apply topically 2 times daily To toenails.    Dermatophytosis of nail       * levofloxacin 750 MG tablet    LEVAQUIN    14 tablet    Take 1 tablet (750 mg) by mouth daily    Ulcer of right lower leg, with fat layer exposed (H), Venous stasis       * levofloxacin 750 MG tablet    LEVAQUIN    14 tablet    Take 1 tablet (750 mg) by mouth daily    Ulcer of right leg, with fat layer exposed (H), Venous stasis       lisinopril 40 MG tablet    PRINIVIL/ZESTRIL    90 tablet    Take 1 tablet (40 mg) by mouth daily    Essential hypertension with goal blood pressure less than 140/90       mupirocin 2 % cream    BACTROBAN    30 g    Apply topically 2 times daily To right leg ulcer.    Ulcer of right lower leg, with fat layer exposed (H), Venous stasis       mupirocin 2 % ointment    BACTROBAN    22 g    Apply topically daily    Ulcer of ankle, right, with fat layer exposed (H), Unspecified venous (peripheral) insufficiency       nateglinide 120 MG tablet    STARLIX    90 tablet    TAKE 1 TABLET BY MOUTH THREE TIMES DAILY BEFORE MEALS    Diabetes mellitus (H)       nystatin Powd     60 g    Apply twice daily to feet    Dermatophytosis of foot       nystatin-triamcinolone cream    MYCOLOG II    60 g    Apply topically 2 times daily    Tinea pedis of both feet, Type 2 diabetes mellitus with peripheral neuropathy (H)       OPTIFOAM 6\"X6\" Pads     1 each    1 Box once a week    Ulcer of right leg, with fat layer exposed (H)       * order for DME     2 each    Please measure and distribute 1 pair of 20mm Hg - 30mm Hg knee high ULCER CARE open or closed toe compression stockings.  Patient has a size 13 foot and please take this into consideration.  Jobst or equivalent    Varicose veins of lower extremities with other complications, Venous stasis ulcer of right lower extremity (H)       * order for DME     3 each    Please " measure and distribute 1 pair of 20mmHg - 30mmHg knee high open or closed toe compression stockings. Jobst ultrasheer or equivalent.    Varicose veins of both lower extremities with complications       * order for DME     2 each    Please measure and distribute 1 pair of 30mmHg - 40mmHg knee high open toe ulcercare compression stockings. Jobst ultrasheer or equivalent.    Varicose veins of bilateral lower extremities with other complications       oxyCODONE 5 MG IR tablet    ROXICODONE    20 tablet    Take 1 tablet (5 mg) by mouth every 4 hours as needed for moderate to severe pain    Venous stasis ulcer of right lower extremity (H), Varicose veins of lower extremities with other complications       sildenafil 50 MG cap/tab    REVATIO/VIAGRA    10 tablet    Take 1 tablet (50 mg) by mouth daily as needed for erectile dysfunction    Vasculogenic erectile dysfunction, unspecified vasculogenic erectile dysfunction type       simvastatin 10 MG tablet    ZOCOR    90 tablet    Take 1 tablet (10 mg) by mouth At Bedtime    Type 2 diabetes, controlled, with neuropathy (H)       sitagliptin 100 MG tablet    JANUVIA    90 tablet    Take 1 tablet (100 mg) by mouth daily    Type 2 diabetes, HbA1c goal < 7% (H)       SSD 1 % cream   Generic drug:  silver sulfADIAZINE           triamcinolone 0.1 % cream    KENALOG    453.6 g    Apply sparingly daily to affected areas on feet and legs.    Venous stasis       VITAMIN D3 PO      Take by mouth daily        * Notice:  This list has 13 medication(s) that are the same as other medications prescribed for you. Read the directions carefully, and ask your doctor or other care provider to review them with you.

## 2017-06-29 NOTE — NURSING NOTE
Cleaned wound with MicroKlenz and 2x2 kin then wrapped wound with [place] after Dr Hoover was finished with appt. Pt left in care of self.  Kassi Thibodeaux RN
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
## 2017-07-01 LAB
BACTERIA SPEC CULT: NO GROWTH
MICRO REPORT STATUS: NORMAL
SPECIMEN SOURCE: NORMAL

## 2017-07-06 ENCOUNTER — OFFICE VISIT (OUTPATIENT)
Dept: INFECTIOUS DISEASES | Facility: CLINIC | Age: 72
End: 2017-07-06
Attending: INTERNAL MEDICINE
Payer: MEDICARE

## 2017-07-06 VITALS
DIASTOLIC BLOOD PRESSURE: 59 MMHG | WEIGHT: 182 LBS | HEART RATE: 93 BPM | TEMPERATURE: 98.1 F | HEIGHT: 75 IN | BODY MASS INDEX: 22.63 KG/M2 | SYSTOLIC BLOOD PRESSURE: 97 MMHG

## 2017-07-06 DIAGNOSIS — L97.912 ULCER OF RIGHT LOWER LEG, WITH FAT LAYER EXPOSED (H): Primary | ICD-10-CM

## 2017-07-06 PROCEDURE — 99213 OFFICE O/P EST LOW 20 MIN: CPT | Mod: ZF

## 2017-07-06 ASSESSMENT — PAIN SCALES - GENERAL: PAINLEVEL: NO PAIN (0)

## 2017-07-06 NOTE — MR AVS SNAPSHOT
After Visit Summary   7/6/2017    Amos Walker    MRN: [id number]           Patient Information     Date Of Birth          1945        Visit Information        Provider Department      7/6/2017 11:30 AM Sinai Hoover MD [facility] and Infectious Diseases        Today's Diagnoses     Ulcer of right lower leg, with fat layer exposed (H)    -  1       Follow-ups after your visit        Your next 10 appointments already scheduled     Jul 14, 2017 10:00 AM CDT   Return Visit with Brayan Mcclain DPM   [facility] ([facility])    [address] [address] [place] [id number]   [phone]            Sep 18, 2017 10:30 AM CDT   (Arrive by 10:15 AM)   Return Visit with Racheal Swift MD   [facility] ([facility])    [address]  4th [facility] [id number] [phone]            Jun 20, 2018 10:30 AM CDT   RETURN RETINA with Jen Aranda MD   [facility] ([facility])    [address]  [address] [facility]  9th Fl Clin [address]a  [facility] [id number] [phone]              Who to contact     If you have questions or need follow up information about today's clinic visit or your schedule please contact [facility] AND INFECTIOUS DISEASES directly at [phone].  Normal or non-critical lab and imaging results will be communicated to you by MyChart, letter or phone within 4 business days after the clinic has received the results. If you do not hear from us within 7 days, please contact the clinic through MyChart or phone. If you have a critical or abnormal lab result, we will notify you by phone as soon as possible.  Submit refill requests through [employer] or call your pharmacy and they will forward the refill request to us. Please allow 3 business days for your refill to be completed.          Additional Information About  "Your Visit        MyChart Information     Flashnotes gives you secure access to your electronic health record. If you see a primary care provider, you can also send messages to your care team and make appointments. If you have questions, please call your primary care clinic.  If you do not have a primary care provider, please call [phone] and they will assist you.        Care EveryWhere ID     This is your Care EveryWhere ID. This could be used by other organizations to access your [place] medical records  [id number]        Your Vitals Were     Pulse Temperature Height BMI (Body Mass Index)          93 98.1  F (36.7  C) (Oral) 1.892 m (6' 2.5\") 23.05 kg/m2         Blood Pressure from Last 3 Encounters:   07/06/17 97/59   06/29/17 118/72   06/23/17 137/73    Weight from Last 3 Encounters:   07/06/17 82.6 kg (182 lb)   06/29/17 83.3 kg (183 lb 9.6 oz)   06/16/17 82.5 kg (181 lb 12.8 oz)              Today, you had the following     No orders found for display         Today's Medication Changes          These changes are accurate as of: 7/6/17 11:59 PM.  If you have any questions, ask your nurse or doctor.               These medicines have changed or have updated prescriptions.        Dose/Directions    hydrochlorothiazide 12.5 MG Tabs tablet   This may have changed:  when to take this   Used for:  Benign essential hypertension        Dose:  12.5 mg   Take 1 tablet (12.5 mg) by mouth daily   Quantity:  90 tablet   Refills:  3                Primary Care Provider Office Phone # Fax #    Racheal Swift [phone]       [address]  [facility] [address]        Equal Access to Services     SAMSON MELTON AH: Nataliia Bedoya, wabruno luroxanna, qaybta kaalmada jose d, yolanda lopez. So [facility] [phone].    ATENCIÓN: Si habla español, tiene a rodrigues disposición servicios gratuitos de asistencia lingüística. Llame al " [phone].    We comply with applicable federal civil rights laws and [place] laws. We do not discriminate on the basis of race, color, national origin, age, disability sex, sexual orientation or gender identity.            Thank you!     Thank you for choosing [facility] AND INFECTIOUS DISEASES  for your care. Our goal is always to provide you with excellent care. Hearing back from our patients is one way we can continue to improve our services. Please take a few minutes to complete the written survey that you may receive in the mail after your visit with us. Thank you!             Your Updated Medication List - Protect others around you: Learn how to safely use, store and throw away your medicines at www.disposemymeds.org.          This list is accurate as of: 7/6/17 11:59 PM.  Always use your most recent med list.                   Brand Name Dispense Instructions for use Diagnosis    * ammonium lactate 12 % cream    AMLACTIN    300 g    Apply twice daily to legs    Dermatitis       * ammonium lactate 12 % cream    LAC-HYDRIN    385 g    Apply topically 2 times daily as needed for dry skin    Diabetic neuropathy with neurologic complication (H), Venous stasis       ascorbic acid 1000 MG Tabs    vitamin C     Take 1,000 mg by mouth daily        aspirin 81 MG chewable tablet      Take 81 mg by mouth daily        blood glucose monitoring lancets     3 Box    Use to test blood sugars 2 as directed.    Type 2 diabetes, uncontrolled, with neuropathy (H)       blood glucose monitoring test strip    ONE TOUCH ULTRA    60 strip    Use to test blood sugars 2 times daily or as directed.    Type 2 diabetes mellitus (H)       ciprofloxacin-dexamethasone otic suspension    CIPRODEX    1 Bottle    Place 4 drops into both ears 2 times daily    Otitis externa       clindamycin 300 MG capsule    CLEOCIN    30 capsule    Take 1 capsule (300 mg) by mouth 3 times daily    Ulcer of right leg, with fat layer exposed  (H), Venous stasis       clopidogrel 75 MG tablet    PLAVIX    30 tablet    Take 1 tablet (75 mg) by mouth daily    Peripheral vascular disease, unspecified (H)       * econazole nitrate 1 % cream     85 g    Apply topically 2 times daily        * econazole nitrate 1 % cream     85 g    Apply topically 2 times daily    Unspecified venous (peripheral) insufficiency       ferrous sulfate 325 (65 FE) MG tablet    IRON    60 tablet    Take 1 tablet (325 mg) by mouth 2 times daily    Peripheral vascular disease, unspecified (H)       * gentamicin 0.1 % ointment    GARAMYCIN    30 g    Apply topically daily To right leg ulcer daily.        * gentamicin 0.1 % cream    GARAMYCIN    30 g    Apply topically 2 times daily To right leg ulcer.    Ulcer of right lower leg, with fat layer exposed (H), Venous stasis       hydrochlorothiazide 12.5 MG Tabs tablet     90 tablet    Take 1 tablet (12.5 mg) by mouth daily    Benign essential hypertension       hydrocortisone 0.2 % cream    WESTCORT    15 g    Apply sparingly to affected area twice times daily for 14 days.    Dermatitis       insulin glargine 100 UNIT/ML injection    LANTUS SOLOSTAR    9 mL    Inject 25 Units Subcutaneous At Bedtime    Type 2 diabetes mellitus with diabetic peripheral angiopathy without gangrene, with long-term current use of insulin (H)       insulin pen needle 31G X 8 MM    B-D U/F    100 each    Use 1 daily o as directed    Diabetes mellitus, type II (H)       * ketoconazole 2 % cream    NIZORAL    60 g    Apply topically 2 times daily    Tinea pedis of both feet       * ketoconazole 2 % cream    NIZORAL    60 g    Apply topically 2 times daily To toenails.    Dermatophytosis of nail       * levofloxacin 750 MG tablet    LEVAQUIN    14 tablet    Take 1 tablet (750 mg) by mouth daily    Ulcer of right lower leg, with fat layer exposed (H), Venous stasis       * levofloxacin 750 MG tablet    LEVAQUIN    14 tablet    Take 1 tablet (750 mg) by mouth daily  "   Ulcer of right leg, with fat layer exposed (H), Venous stasis       lisinopril 40 MG tablet    PRINIVIL/ZESTRIL    90 tablet    Take 1 tablet (40 mg) by mouth daily    Essential hypertension with goal blood pressure less than 140/90       mupirocin 2 % cream    BACTROBAN    30 g    Apply topically 2 times daily To right leg ulcer.    Ulcer of right lower leg, with fat layer exposed (H), Venous stasis       mupirocin 2 % ointment    BACTROBAN    22 g    Apply topically daily    Ulcer of ankle, right, with fat layer exposed (H), Unspecified venous (peripheral) insufficiency       nateglinide 120 MG tablet    STARLIX    90 tablet    TAKE 1 TABLET BY MOUTH THREE TIMES DAILY BEFORE MEALS    Diabetes mellitus (H)       nystatin Powd     60 g    Apply twice daily to feet    Dermatophytosis of foot       nystatin-triamcinolone cream    MYCOLOG II    60 g    Apply topically 2 times daily    Tinea pedis of both feet, Type 2 diabetes mellitus with peripheral neuropathy (H)       OPTIFOAM 6\"X6\" Pads     1 each    1 Box once a week    Ulcer of right leg, with fat layer exposed (H)       * order for DME     2 each    Please measure and distribute 1 pair of 20mm Hg - 30mm Hg knee high ULCER CARE open or closed toe compression stockings.  Patient has a size 13 foot and please take this into consideration.  Jobst or equivalent    Varicose veins of lower extremities with other complications, Venous stasis ulcer of right lower extremity (H)       * order for DME     3 each    Please measure and distribute 1 pair of 20mmHg - 30mmHg knee high open or closed toe compression stockings. Jobst ultrasheer or equivalent.    Varicose veins of both lower extremities with complications       * order for DME     2 each    Please measure and distribute 1 pair of 30mmHg - 40mmHg knee high open toe ulcercare compression stockings. Jobst ultrasheer or equivalent.    Varicose veins of bilateral lower extremities with other complications       " oxyCODONE 5 MG IR tablet    ROXICODONE    20 tablet    Take 1 tablet (5 mg) by mouth every 4 hours as needed for moderate to severe pain    Venous stasis ulcer of right lower extremity (H), Varicose veins of lower extremities with other complications       sildenafil 50 MG cap/tab    REVATIO/VIAGRA    10 tablet    Take 1 tablet (50 mg) by mouth daily as needed for erectile dysfunction    Vasculogenic erectile dysfunction, unspecified vasculogenic erectile dysfunction type       simvastatin 10 MG tablet    ZOCOR    90 tablet    Take 1 tablet (10 mg) by mouth At Bedtime    Type 2 diabetes, controlled, with neuropathy (H)       sitagliptin 100 MG tablet    JANUVIA    90 tablet    Take 1 tablet (100 mg) by mouth daily    Type 2 diabetes, HbA1c goal < 7% (H)       SSD 1 % cream   Generic drug:  silver sulfADIAZINE           triamcinolone 0.1 % cream    KENALOG    453.6 g    Apply sparingly daily to affected areas on feet and legs.    Venous stasis       VITAMIN D3 PO      Take by mouth daily        * Notice:  This list has 13 medication(s) that are the same as other medications prescribed for you. Read the directions carefully, and ask your doctor or other care provider to review them with you.

## 2017-07-06 NOTE — LETTER
7/6/2017       RE: Amos Walker  [address]  [facility] [address]     Dear Colleague,    Thank you for referring your patient, Amos Walker, to the [facility] AND INFECTIOUS DISEASES at [facility]. Please see a copy of my visit note below.    [facility] - Consultation  Dr. Sinai Hoover, [facility], [facility], [address] [place] [address], Sixth Floor, Clinic [address]  [place] [address]  Patient:  Amos Walker, Date of birth 1945, Medical record number [id number]  Date of Visit:  Jul 6, 2017         Assessment and Recommendations:     Lower leg wound  It is looking stable or a little improved from last week.  We will proceed with our plan as described in my last note - ongoing wound care per Dr. Mcclain unless he develops worsening.  If that happens, we will reassess and consider further imaging.       Sinai Hoover MD  Division of Infectious Diseases and International Medicine  [phone]         History of Infectious Disease Illness:   Mr. Walker is a 72 year old gentleman with a history of venous status and diabetes who comes to me for evaluation of an anterior lower leg wound that has been followed by Dr. Piyush Mcclain. I saw him last week and I am seeing him this week just to evaluate the progress of his wound.  No increased drainage - just fibrinous exudate on the bandages with dressing changes.  No fevers, no sweats. No new lesions or rashes.  No diarrhea.          Review of Systems:   ROS: 4 point ROS neg other than the symptoms noted above in the HPI.         Current Medications:     Current Outpatient Prescriptions   Medication Sig Dispense Refill     levofloxacin (LEVAQUIN) 750 MG tablet Take 1 tablet (750 mg) by mouth daily 14 tablet 0     clindamycin (CLEOCIN) 300 MG capsule Take 1 capsule (300 mg) by mouth 3 times daily 30  capsule 0     insulin pen needle (B-D U/F) 31G X 8 MM Use 1 daily o as directed 100 each 3     mupirocin (BACTROBAN) 2 % cream Apply topically 2 times daily To right leg ulcer. 30 g 3     gentamicin (GARAMYCIN) 0.1 % cream Apply topically 2 times daily To right leg ulcer. 30 g 3     order for DME Please measure and distribute 1 pair of 30mmHg - 40mmHg knee high open toe ulcercare compression stockings. Jobst ultrasheer or equivalent. 2 each 6     levofloxacin (LEVAQUIN) 750 MG tablet Take 1 tablet (750 mg) by mouth daily 14 tablet 0     clopidogrel (PLAVIX) 75 MG tablet Take 1 tablet (75 mg) by mouth daily 30 tablet 11     nateglinide (STARLIX) 120 MG tablet TAKE 1 TABLET BY MOUTH THREE TIMES DAILY BEFORE MEALS 90 tablet 11     blood glucose monitoring (ONE TOUCH ULTRA) test strip Use to test blood sugars 2 times daily or as directed. 60 strip 11     sitagliptin (JANUVIA) 100 MG tablet Take 1 tablet (100 mg) by mouth daily 90 tablet 3     hydrochlorothiazide 12.5 MG TABS tablet Take 1 tablet (12.5 mg) by mouth daily (Patient taking differently: Take 12.5 mg by mouth every other day ) 90 tablet 3     ketoconazole (NIZORAL) 2 % cream Apply topically 2 times daily To toenails. 60 g 4     triamcinolone (KENALOG) 0.1 % cream Apply sparingly daily to affected areas on feet and legs. 453.6 g 0     ammonium lactate (LAC-HYDRIN) 12 % cream Apply topically 2 times daily as needed for dry skin 385 g 3     SSD 1 % cream        hydrocortisone (WESTCORT) 0.2 % cream Apply sparingly to affected area twice times daily for 14 days. 15 g 3     simvastatin (ZOCOR) 10 MG tablet Take 1 tablet (10 mg) by mouth At Bedtime 90 tablet 3     insulin glargine (LANTUS SOLOSTAR) 100 UNIT/ML PEN Inject 25 Units Subcutaneous At Bedtime 9 mL 5     sildenafil (VIAGRA) 50 MG tablet Take 1 tablet (50 mg) by mouth daily as needed for erectile dysfunction 10 tablet 11     lisinopril (PRINIVIL,ZESTRIL) 40 MG tablet Take 1 tablet (40 mg) by mouth daily 90  "tablet 3     ciprofloxacin-dexamethasone (CIPRODEX) otic suspension Place 4 drops into both ears 2 times daily 1 Bottle 0     ketoconazole (NIZORAL) 2 % cream Apply topically 2 times daily 60 g 1     gentamicin (GARAMYCIN) 0.1 % ointment Apply topically daily To right leg ulcer daily. 30 g 1     blood glucose monitoring (FREESTYLE) lancets Use to test blood sugars 2 as directed. 3 Box 3     order for DME Please measure and distribute 1 pair of 20mmHg - 30mmHg knee high open or closed toe compression stockings. Jobst ultrasheer or equivalent. 3 each 12     nystatin-triamcinolone (MYCOLOG II) cream Apply topically 2 times daily 60 g 1     nystatin POWD Apply twice daily to feet 60 g 11     order for DME Please measure and distribute 1 pair of 20mm Hg - 30mm Hg knee high ULCER CARE open or closed toe compression stockings.   Patient has a size 13 foot and please take this into consideration.   Jobst or equivalent 2 each 1     ammonium lactate (AMLACTIN) 12 % cream Apply twice daily to legs 300 g 11     mupirocin (BACTROBAN) 2 % ointment Apply topically daily 22 g 0     econazole nitrate 1 % cream Apply topically 2 times daily 85 g 2     oxyCODONE (ROXICODONE) 5 MG immediate release tablet Take 1 tablet (5 mg) by mouth every 4 hours as needed for moderate to severe pain 20 tablet 0     ferrous sulfate (IRON) 325 (65 FE) MG tablet Take 1 tablet (325 mg) by mouth 2 times daily 60 tablet 11     econazole nitrate 1 % cream Apply topically 2 times daily 85 g 1     Gauze Pads & Dressings (OPTIFOAM) 6\"X6\" PADS 1 Box once a week 1 each 6     ascorbic acid (VITAMIN C) 1000 MG TABS Take 1,000 mg by mouth daily       aspirin 81 MG chewable tablet Take 81 mg by mouth daily       Cholecalciferol (VITAMIN D3 PO) Take by mouth daily              Immunization History:     Immunization History   Administered Date(s) Administered     Influenza (High Dose) 3 valent vaccine 11/03/2014, 09/28/2015, 10/31/2016     Influenza (IIV3) 11/01/2013 " "    Pneumococcal (PCV 13) 11/03/2014     Pneumococcal 23 valent 12/21/2015     TDAP Vaccine (Boostrix) 03/21/2016            Allergies:     Allergies   Allergen Reactions     Lisinopril Other (See Comments)     dizziness     Neomycin Other (See Comments)     Wound gets worse     Methylchloroisothiazolinone [Methylisothiazolinone] Rash     Povidone Iodine Rash            Physical Exam:   Vital signs:  BP 97/59  Pulse 93  Temp 98.1  F (36.7  C) (Oral)  Ht 1.892 m (6' 2.5\")  Wt 82.6 kg (182 lb)  BMI 23.05 kg/m2    Physical Examination:  GENERAL:  well-developed, well-nourished, seated in no acute distress.  HEENT:  Head is normocephalic, atraumatic   EYES:  Eyes have anicteric sclerae without conjunctival injection   SKIN:  No acute systemic rashes.  Ulcer on the anterior right lower leg ulcer through the subcutaneous tissue - it appears in the center that there is muscle visible.  No significant drainage.  Probed with cotton tipped applicator and could no appreciate bone.  8 x 2.5 cm at its widest margins - same as Dr. Mcclain's last charted examination. Some slough in areas of the wound with areas of pink granulation tissue. No significant surrounding erythema.   NEUROLOGIC:  Grossly nonfocal. Active x4 extremities         Laboratory Data:     Metabolic Studies   Sodium   Date Value Ref Range Status   02/06/2017 139 133 - 144 mmol/L Final   06/27/2016 135 133 - 144 mmol/L Final     Potassium   Date Value Ref Range Status   02/06/2017 4.6 3.4 - 5.3 mmol/L Final   06/27/2016 4.5 3.4 - 5.3 mmol/L Final     Chloride   Date Value Ref Range Status   02/06/2017 108 94 - 109 mmol/L Final   06/27/2016 104 94 - 109 mmol/L Final     Carbon Dioxide   Date Value Ref Range Status   02/06/2017 22 20 - 32 mmol/L Final   06/27/2016 26 20 - 32 mmol/L Final     Anion Gap   Date Value Ref Range Status   02/06/2017 9 3 - 14 mmol/L Final   06/27/2016 5 3 - 14 mmol/L Final     Urea Nitrogen   Date Value Ref Range Status   02/06/2017 " 35 (H) 7 - 30 mg/dL Final   06/27/2016 30 7 - 30 mg/dL Final     Creatinine   Date Value Ref Range Status   02/06/2017 1.28 (H) 0.66 - 1.25 mg/dL Final   06/27/2016 1.21 0.66 - 1.25 mg/dL Final     GFR Estimate   Date Value Ref Range Status   02/06/2017 56 (L) >60 mL/min/1.7m2 Final     Comment:     Non  GFR Calc   06/27/2016 59 (L) >60 mL/min/1.7m2 Final     Comment:     Non  GFR Calc     Glucose   Date Value Ref Range Status   02/06/2017 218 (H) 70 - 99 mg/dL Final   06/27/2016 152 (H) 70 - 99 mg/dL Final     Hemoglobin A1C   Date Value Ref Range Status   06/16/2017 6.7 (H) 4.3 - 6.0 % Final     Calcium   Date Value Ref Range Status   02/06/2017 8.3 (L) 8.5 - 10.1 mg/dL Final   06/27/2016 8.5 8.5 - 10.1 mg/dL Final     CRP Inflammation   Date Value Ref Range Status   06/29/2017 <2.9 0.0 - 8.0 mg/L Final       Inflammatory Markers   CRP Inflammation   Date Value Ref Range Status   06/29/2017 <2.9 0.0 - 8.0 mg/L Final       Hepatic Studies    ALT   Date Value Ref Range Status   10/31/2016 14 0 - 70 U/L Final   11/18/2014 15 0 - 70 U/L Final       Hematology Studies      WBC   Date Value Ref Range Status   11/18/2014 4.8 4.0 - 11.0 10e9/L Final     Hemoglobin   Date Value Ref Range Status   11/18/2014 11.7 (L) 13.3 - 17.7 g/dL Final     Hematocrit   Date Value Ref Range Status   11/18/2014 34.8 (L) 40.0 - 53.0 % Final     Platelet Count   Date Value Ref Range Status   11/18/2014 189 150 - 450 10e9/L Final     Imaging:  No results found for this or any previous visit (from the past 744 hour(s)).        Sinai Hoover MD

## 2017-07-06 NOTE — NURSING NOTE
"Chief Complaint   Patient presents with     RECHECK     follow up with ulcer on right leg, tzimmer cma       Initial BP 97/59  Pulse 93  Temp 98.1  F (36.7  C) (Oral)  Ht 1.892 m (6' 2.5\")  Wt 82.6 kg (182 lb)  BMI 23.05 kg/m2 Estimated body mass index is 23.05 kg/(m^2) as calculated from the following:    Height as of this encounter: 1.892 m (6' 2.5\").    Weight as of this encounter: 82.6 kg (182 lb).  Medication Reconciliation: complete    "

## 2017-07-07 NOTE — PROGRESS NOTES
[facility] - Consultation  Dr. Sinai Hoover, [facility], [facility] [place], [address] [place] [address], Sixth Floor, [address]  [place] [address]  Patient:  Amos Walker, Date of birth 1945, Medical record number [id number]  Date of Visit:  Jul 6, 2017         Assessment and Recommendations:     Lower leg wound  It is looking stable or a little improved from last week.  We will proceed with our plan as described in my last note - ongoing wound care per Dr. Mcclain unless he develops worsening.  If that happens, we will reassess and consider further imaging.       Sinai Hoover MD  Division of Infectious Diseases and International Medicine  [phone]         History of Infectious Disease Illness:   Mr. Walker is a 72 year old gentleman with a history of venous status and diabetes who comes to me for evaluation of an anterior lower leg wound that has been followed by Dr. Piyush Mcclain. I saw him last week and I am seeing him this week just to evaluate the progress of his wound.  No increased drainage - just fibrinous exudate on the bandages with dressing changes.  No fevers, no sweats. No new lesions or rashes.  No diarrhea.          Review of Systems:   ROS: 4 point ROS neg other than the symptoms noted above in the HPI.         Current Medications:     Current Outpatient Prescriptions   Medication Sig Dispense Refill     levofloxacin (LEVAQUIN) 750 MG tablet Take 1 tablet (750 mg) by mouth daily 14 tablet 0     clindamycin (CLEOCIN) 300 MG capsule Take 1 capsule (300 mg) by mouth 3 times daily 30 capsule 0     insulin pen needle (B-D U/F) 31G X 8 MM Use 1 daily o as directed 100 each 3     mupirocin (BACTROBAN) 2 % cream Apply topically 2 times daily To right leg ulcer. 30 g 3     gentamicin (GARAMYCIN) 0.1 % cream Apply topically 2 times daily To right leg ulcer. 30 g 3     order for DME Please  measure and distribute 1 pair of 30mmHg - 40mmHg knee high open toe ulcercare compression stockings. Jobst ultrasheer or equivalent. 2 each 6     levofloxacin (LEVAQUIN) 750 MG tablet Take 1 tablet (750 mg) by mouth daily 14 tablet 0     clopidogrel (PLAVIX) 75 MG tablet Take 1 tablet (75 mg) by mouth daily 30 tablet 11     nateglinide (STARLIX) 120 MG tablet TAKE 1 TABLET BY MOUTH THREE TIMES DAILY BEFORE MEALS 90 tablet 11     blood glucose monitoring (ONE TOUCH ULTRA) test strip Use to test blood sugars 2 times daily or as directed. 60 strip 11     sitagliptin (JANUVIA) 100 MG tablet Take 1 tablet (100 mg) by mouth daily 90 tablet 3     hydrochlorothiazide 12.5 MG TABS tablet Take 1 tablet (12.5 mg) by mouth daily (Patient taking differently: Take 12.5 mg by mouth every other day ) 90 tablet 3     ketoconazole (NIZORAL) 2 % cream Apply topically 2 times daily To toenails. 60 g 4     triamcinolone (KENALOG) 0.1 % cream Apply sparingly daily to affected areas on feet and legs. 453.6 g 0     ammonium lactate (LAC-HYDRIN) 12 % cream Apply topically 2 times daily as needed for dry skin 385 g 3     SSD 1 % cream        hydrocortisone (WESTCORT) 0.2 % cream Apply sparingly to affected area twice times daily for 14 days. 15 g 3     simvastatin (ZOCOR) 10 MG tablet Take 1 tablet (10 mg) by mouth At Bedtime 90 tablet 3     insulin glargine (LANTUS SOLOSTAR) 100 UNIT/ML PEN Inject 25 Units Subcutaneous At Bedtime 9 mL 5     sildenafil (VIAGRA) 50 MG tablet Take 1 tablet (50 mg) by mouth daily as needed for erectile dysfunction 10 tablet 11     lisinopril (PRINIVIL,ZESTRIL) 40 MG tablet Take 1 tablet (40 mg) by mouth daily 90 tablet 3     ciprofloxacin-dexamethasone (CIPRODEX) otic suspension Place 4 drops into both ears 2 times daily 1 Bottle 0     ketoconazole (NIZORAL) 2 % cream Apply topically 2 times daily 60 g 1     gentamicin (GARAMYCIN) 0.1 % ointment Apply topically daily To right leg ulcer daily. 30 g 1     blood  "glucose monitoring (FREESTYLE) lancets Use to test blood sugars 2 as directed. 3 Box 3     order for DME Please measure and distribute 1 pair of 20mmHg - 30mmHg knee high open or closed toe compression stockings. Jobst ultrasheer or equivalent. 3 each 12     nystatin-triamcinolone (MYCOLOG II) cream Apply topically 2 times daily 60 g 1     nystatin POWD Apply twice daily to feet 60 g 11     order for DME Please measure and distribute 1 pair of 20mm Hg - 30mm Hg knee high ULCER CARE open or closed toe compression stockings.   Patient has a size 13 foot and please take this into consideration.   Jobst or equivalent 2 each 1     ammonium lactate (AMLACTIN) 12 % cream Apply twice daily to legs 300 g 11     mupirocin (BACTROBAN) 2 % ointment Apply topically daily 22 g 0     econazole nitrate 1 % cream Apply topically 2 times daily 85 g 2     oxyCODONE (ROXICODONE) 5 MG immediate release tablet Take 1 tablet (5 mg) by mouth every 4 hours as needed for moderate to severe pain 20 tablet 0     ferrous sulfate (IRON) 325 (65 FE) MG tablet Take 1 tablet (325 mg) by mouth 2 times daily 60 tablet 11     econazole nitrate 1 % cream Apply topically 2 times daily 85 g 1     Gauze Pads & Dressings (OPTIFOAM) 6\"X6\" PADS 1 Box once a week 1 each 6     ascorbic acid (VITAMIN C) 1000 MG TABS Take 1,000 mg by mouth daily       aspirin 81 MG chewable tablet Take 81 mg by mouth daily       Cholecalciferol (VITAMIN D3 PO) Take by mouth daily              Immunization History:     Immunization History   Administered Date(s) Administered     Influenza (High Dose) 3 valent vaccine 11/03/2014, 09/28/2015, 10/31/2016     Influenza (IIV3) 11/01/2013     Pneumococcal (PCV 13) 11/03/2014     Pneumococcal 23 valent 12/21/2015     TDAP Vaccine (Boostrix) 03/21/2016            Allergies:     Allergies   Allergen Reactions     Lisinopril Other (See Comments)     dizziness     Neomycin Other (See Comments)     Wound gets worse     " "Methylchloroisothiazolinone [Methylisothiazolinone] Rash     Povidone Iodine Rash            Physical Exam:   Vital signs:  BP 97/59  Pulse 93  Temp 98.1  F (36.7  C) (Oral)  Ht 1.892 m (6' 2.5\")  Wt 82.6 kg (182 lb)  BMI 23.05 kg/m2    Physical Examination:  GENERAL:  well-developed, well-nourished, seated in no acute distress.  HEENT:  Head is normocephalic, atraumatic   EYES:  Eyes have anicteric sclerae without conjunctival injection   SKIN:  No acute systemic rashes.  Ulcer on the anterior right lower leg ulcer through the subcutaneous tissue - it appears in the center that there is muscle visible.  No significant drainage.  Probed with cotton tipped applicator and could no appreciate bone.  8 x 2.5 cm at its widest margins - same as Dr. Mcclain's last charted examination. Some slough in areas of the wound with areas of pink granulation tissue. No significant surrounding erythema.   NEUROLOGIC:  Grossly nonfocal. Active x4 extremities         Laboratory Data:     Metabolic Studies   Sodium   Date Value Ref Range Status   02/06/2017 139 133 - 144 mmol/L Final   06/27/2016 135 133 - 144 mmol/L Final     Potassium   Date Value Ref Range Status   02/06/2017 4.6 3.4 - 5.3 mmol/L Final   06/27/2016 4.5 3.4 - 5.3 mmol/L Final     Chloride   Date Value Ref Range Status   02/06/2017 108 94 - 109 mmol/L Final   06/27/2016 104 94 - 109 mmol/L Final     Carbon Dioxide   Date Value Ref Range Status   02/06/2017 22 20 - 32 mmol/L Final   06/27/2016 26 20 - 32 mmol/L Final     Anion Gap   Date Value Ref Range Status   02/06/2017 9 3 - 14 mmol/L Final   06/27/2016 5 3 - 14 mmol/L Final     Urea Nitrogen   Date Value Ref Range Status   02/06/2017 35 (H) 7 - 30 mg/dL Final   06/27/2016 30 7 - 30 mg/dL Final     Creatinine   Date Value Ref Range Status   02/06/2017 1.28 (H) 0.66 - 1.25 mg/dL Final   06/27/2016 1.21 0.66 - 1.25 mg/dL Final     GFR Estimate   Date Value Ref Range Status   02/06/2017 56 (L) >60 mL/min/1.7m2 " Final     Comment:     Non  GFR Calc   06/27/2016 59 (L) >60 mL/min/1.7m2 Final     Comment:     Non  GFR Calc     Glucose   Date Value Ref Range Status   02/06/2017 218 (H) 70 - 99 mg/dL Final   06/27/2016 152 (H) 70 - 99 mg/dL Final     Hemoglobin A1C   Date Value Ref Range Status   06/16/2017 6.7 (H) 4.3 - 6.0 % Final     Calcium   Date Value Ref Range Status   02/06/2017 8.3 (L) 8.5 - 10.1 mg/dL Final   06/27/2016 8.5 8.5 - 10.1 mg/dL Final     CRP Inflammation   Date Value Ref Range Status   06/29/2017 <2.9 0.0 - 8.0 mg/L Final       Inflammatory Markers   CRP Inflammation   Date Value Ref Range Status   06/29/2017 <2.9 0.0 - 8.0 mg/L Final       Hepatic Studies    ALT   Date Value Ref Range Status   10/31/2016 14 0 - 70 U/L Final   11/18/2014 15 0 - 70 U/L Final       Hematology Studies      WBC   Date Value Ref Range Status   11/18/2014 4.8 4.0 - 11.0 10e9/L Final     Hemoglobin   Date Value Ref Range Status   11/18/2014 11.7 (L) 13.3 - 17.7 g/dL Final     Hematocrit   Date Value Ref Range Status   11/18/2014 34.8 (L) 40.0 - 53.0 % Final     Platelet Count   Date Value Ref Range Status   11/18/2014 189 150 - 450 10e9/L Final     Imaging:  No results found for this or any previous visit (from the past 744 hour(s)).

## 2017-07-14 ENCOUNTER — OFFICE VISIT (OUTPATIENT)
Dept: PODIATRY | Facility: CLINIC | Age: 72
End: 2017-07-14
Payer: MEDICARE

## 2017-07-14 VITALS — HEART RATE: 98 BPM | SYSTOLIC BLOOD PRESSURE: 137 MMHG | DIASTOLIC BLOOD PRESSURE: 61 MMHG | OXYGEN SATURATION: 99 %

## 2017-07-14 DIAGNOSIS — E11.40 DIABETIC NEUROPATHY WITH NEUROLOGIC COMPLICATION (H): ICD-10-CM

## 2017-07-14 DIAGNOSIS — E11.49 DIABETIC NEUROPATHY WITH NEUROLOGIC COMPLICATION (H): ICD-10-CM

## 2017-07-14 DIAGNOSIS — L97.912 ULCER OF RIGHT LOWER LEG, WITH FAT LAYER EXPOSED (H): Primary | ICD-10-CM

## 2017-07-14 PROCEDURE — 99213 OFFICE O/P EST LOW 20 MIN: CPT | Performed by: PODIATRIST
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
## 2017-07-14 NOTE — PATIENT INSTRUCTIONS
Thanks for coming today.  Ortho[facility] Medicine Clinic  [address] [place] [address]    To schedule future appointments in Ortho Clinic, you may call [phone].    To schedule ordered imaging by your Provider: Call [facility] at [phone]    [employer] available online at:   [email or web address]    Please call if any further questions or concerns [phone] and ask for the Orthopedic Department. Clinic hours 8 am to 5 pm.    Return to clinic if symptoms worsen.

## 2017-07-14 NOTE — MR AVS SNAPSHOT
After Visit Summary   7/14/2017    Amos Walker    MRN: [id number]           Patient Information     Date Of Birth          1945        Visit Information        Provider Department      7/14/2017 10:00 AM Brayan Mcclain DPM [facility]        Today's Diagnoses     Ulcer of right lower leg, with fat layer exposed (H)    -  1    Chronic venous hypertension with ulcer involving right side (H)        Diabetic neuropathy with neurologic complication (H)          Care Instructions    Thanks for coming today.  [facility]  [address] [place] [id number]    To schedule future appointments in Ortho Clinic, you may call [phone].    To schedule ordered imaging by your Provider: Call [facility] at [phone]    APSX available online at:   [email or web address]    Please call if any further questions or concerns [phone] and ask for the Orthopedic Department. Clinic hours 8 am to 5 pm.    Return to clinic if symptoms worsen.            Follow-ups after your visit        Your next 10 appointments already scheduled     Jul 28, 2017 10:00 AM CDT   Return Visit with Brayan Mcclain DPM   [facility] ([facility])    [address] [place] [address] [phone]            Sep 18, 2017 10:30 AM CDT   (Arrive by 10:15 AM)   Return Visit with Racheal Swift MD   [facility] ([facility])    [address]  [address] [facility] [address] [phone]            Jun 20, 2018 10:30 AM CDT   RETURN RETINA with Jen Aranda MD   [facility] ([facility])    [address]  [address] [facility]  [address] [address]  [facility] [id number] [phone]              Who to contact     If you have questions or need follow up information about today's clinic visit or your schedule please contact [facility]  [place] [facility] directly at [phone].  Normal or non-critical lab and imaging results will be communicated to you by Mobile [employer], letter or phone within 4 business days after the clinic has received the results. If you do not hear from us within 7 days, please contact the clinic through [employer] or phone. If you have a critical or abnormal lab result, we will notify you by phone as soon as possible.  Submit refill requests through webme or call your pharmacy and they will forward the refill request to us. Please allow 3 business days for your refill to be completed.          Additional Information About Your Visit        [employer] Information     webme gives you secure access to your electronic health record. If you see a primary care provider, you can also send messages to your care team and make appointments. If you have questions, please call your primary care clinic.  If you do not have a primary care provider, please call [phone] and they will assist you.      webme is an electronic gateway that provides easy, online access to your medical records. With webme, you can request a clinic appointment, read your test results, renew a prescription or communicate with your care team.     To access your existing account, please contact your [facility] or call [phone] for assistance.        Care EveryWhere ID     This is your Care EveryWhere ID. This could be used by other organizations to access your [place] medical records  [id number]        Your Vitals Were     Pulse Pulse Oximetry                98 99%           Blood Pressure from Last 3 Encounters:   07/14/17 137/61   07/06/17 97/59   06/29/17 118/72    Weight from Last 3 Encounters:   07/06/17 82.6 kg (182 lb)   06/29/17 83.3 kg (183 lb 9.6 oz)   06/16/17 82.5 kg (181 lb 12.8 oz)              Today, you had the following     No orders found for display         Today's Medication Changes          These changes are  accurate as of: 7/14/17 10:23 AM.  If you have any questions, ask your nurse or doctor.               These medicines have changed or have updated prescriptions.        Dose/Directions    hydrochlorothiazide 12.5 MG Tabs tablet   This may have changed:  when to take this   Used for:  Benign essential hypertension        Dose:  12.5 mg   Take 1 tablet (12.5 mg) by mouth daily   Quantity:  90 tablet   Refills:  3                Primary Care Provider Office Phone # Fax #    Racheal Swift [phone]       [address] [address]        Equal Access to Services     ROSA ELENA [facility] : Hadii niurka webb hadashyomaira Soger, waaxda luqgayatri, qaybta kaalmada adejessee, yolanda duran . So [facility] [phone].    ATENCIÓN: Si habla español, tiene a rodrigues disposición servicios gratuitos de asistencia lingüística. [facility] [phone].    We comply with applicable federal civil rights laws and [place] laws. We do not discriminate on the basis of race, color, national origin, age, disability sex, sexual orientation or gender identity.            Thank you!     Thank you for choosing [facility]  for your care. Our goal is always to provide you with excellent care. Hearing back from our patients is one way we can continue to improve our services. Please take a few minutes to complete the written survey that you may receive in the mail after your visit with us. Thank you!             Your Updated Medication List - Protect others around you: Learn how to safely use, store and throw away your medicines at www.disposemymeds.org.          This list is accurate as of: 7/14/17 10:23 AM.  Always use your most recent med list.                   Brand Name Dispense Instructions for use Diagnosis    * ammonium lactate 12 % cream    AMLACTIN    300 g    Apply twice daily to legs    Dermatitis       * ammonium lactate 12 % cream    LAC-HYDRIN    385 g     Apply topically 2 times daily as needed for dry skin    Diabetic neuropathy with neurologic complication (H), Venous stasis       ascorbic acid 1000 MG Tabs    vitamin C     Take 1,000 mg by mouth daily        aspirin 81 MG chewable tablet      Take 81 mg by mouth daily        blood glucose monitoring lancets     3 Box    Use to test blood sugars 2 as directed.    Type 2 diabetes, uncontrolled, with neuropathy (H)       blood glucose monitoring test strip    ONE TOUCH ULTRA    60 strip    Use to test blood sugars 2 times daily or as directed.    Type 2 diabetes mellitus (H)       ciprofloxacin-dexamethasone otic suspension    CIPRODEX    1 Bottle    Place 4 drops into both ears 2 times daily    Otitis externa       clindamycin 300 MG capsule    CLEOCIN    30 capsule    Take 1 capsule (300 mg) by mouth 3 times daily    Ulcer of right leg, with fat layer exposed (H), Venous stasis       clopidogrel 75 MG tablet    PLAVIX    30 tablet    Take 1 tablet (75 mg) by mouth daily    Peripheral vascular disease, unspecified (H)       * econazole nitrate 1 % cream     85 g    Apply topically 2 times daily        * econazole nitrate 1 % cream     85 g    Apply topically 2 times daily    Unspecified venous (peripheral) insufficiency       ferrous sulfate 325 (65 FE) MG tablet    IRON    60 tablet    Take 1 tablet (325 mg) by mouth 2 times daily    Peripheral vascular disease, unspecified (H)       * gentamicin 0.1 % ointment    GARAMYCIN    30 g    Apply topically daily To right leg ulcer daily.        * gentamicin 0.1 % cream    GARAMYCIN    30 g    Apply topically 2 times daily To right leg ulcer.    Ulcer of right lower leg, with fat layer exposed (H), Venous stasis       hydrochlorothiazide 12.5 MG Tabs tablet     90 tablet    Take 1 tablet (12.5 mg) by mouth daily    Benign essential hypertension       hydrocortisone 0.2 % cream    WESTCORT    15 g    Apply sparingly to affected area twice times daily for 14 days.     "Dermatitis       insulin glargine 100 UNIT/ML injection    LANTUS SOLOSTAR    9 mL    Inject 25 Units Subcutaneous At Bedtime    Type 2 diabetes mellitus with diabetic peripheral angiopathy without gangrene, with long-term current use of insulin (H)       insulin pen needle 31G X 8 MM    B-D U/F    100 each    Use 1 daily o as directed    Diabetes mellitus, type II (H)       * ketoconazole 2 % cream    NIZORAL    60 g    Apply topically 2 times daily    Tinea pedis of both feet       * ketoconazole 2 % cream    NIZORAL    60 g    Apply topically 2 times daily To toenails.    Dermatophytosis of nail       * levofloxacin 750 MG tablet    LEVAQUIN    14 tablet    Take 1 tablet (750 mg) by mouth daily    Ulcer of right lower leg, with fat layer exposed (H), Venous stasis       * levofloxacin 750 MG tablet    LEVAQUIN    14 tablet    Take 1 tablet (750 mg) by mouth daily    Ulcer of right leg, with fat layer exposed (H), Venous stasis       lisinopril 40 MG tablet    PRINIVIL/ZESTRIL    90 tablet    Take 1 tablet (40 mg) by mouth daily    Essential hypertension with goal blood pressure less than 140/90       mupirocin 2 % cream    BACTROBAN    30 g    Apply topically 2 times daily To right leg ulcer.    Ulcer of right lower leg, with fat layer exposed (H), Venous stasis       mupirocin 2 % ointment    BACTROBAN    22 g    Apply topically daily    Ulcer of ankle, right, with fat layer exposed (H), Unspecified venous (peripheral) insufficiency       nateglinide 120 MG tablet    STARLIX    90 tablet    TAKE 1 TABLET BY MOUTH THREE TIMES DAILY BEFORE MEALS    Diabetes mellitus (H)       nystatin Powd     60 g    Apply twice daily to feet    Dermatophytosis of foot       nystatin-triamcinolone cream    MYCOLOG II    60 g    Apply topically 2 times daily    Tinea pedis of both feet, Type 2 diabetes mellitus with peripheral neuropathy (H)       OPTIFOAM 6\"X6\" Pads     1 each    1 Box once a week    Ulcer of right leg, with fat " layer exposed (H)       * order for DME     2 each    Please measure and distribute 1 pair of 20mm Hg - 30mm Hg knee high ULCER CARE open or closed toe compression stockings.  Patient has a size 13 foot and please take this into consideration.  Jobst or equivalent    Varicose veins of lower extremities with other complications, Venous stasis ulcer of right lower extremity (H)       * order for DME     3 each    Please measure and distribute 1 pair of 20mmHg - 30mmHg knee high open or closed toe compression stockings. Jobst ultrasheer or equivalent.    Varicose veins of both lower extremities with complications       * order for DME     2 each    Please measure and distribute 1 pair of 30mmHg - 40mmHg knee high open toe ulcercare compression stockings. Jobst ultrasheer or equivalent.    Varicose veins of bilateral lower extremities with other complications       oxyCODONE 5 MG IR tablet    ROXICODONE    20 tablet    Take 1 tablet (5 mg) by mouth every 4 hours as needed for moderate to severe pain    Venous stasis ulcer of right lower extremity (H), Varicose veins of lower extremities with other complications       sildenafil 50 MG cap/tab    REVATIO/VIAGRA    10 tablet    Take 1 tablet (50 mg) by mouth daily as needed for erectile dysfunction    Vasculogenic erectile dysfunction, unspecified vasculogenic erectile dysfunction type       simvastatin 10 MG tablet    ZOCOR    90 tablet    Take 1 tablet (10 mg) by mouth At Bedtime    Type 2 diabetes, controlled, with neuropathy (H)       sitagliptin 100 MG tablet    JANUVIA    90 tablet    Take 1 tablet (100 mg) by mouth daily    Type 2 diabetes, HbA1c goal < 7% (H)       SSD 1 % cream   Generic drug:  silver sulfADIAZINE           triamcinolone 0.1 % cream    KENALOG    453.6 g    Apply sparingly daily to affected areas on feet and legs.    Venous stasis       VITAMIN D3 PO      Take by mouth daily        * Notice:  This list has 13 medication(s) that are the same as  other medications prescribed for you. Read the directions carefully, and ask your doctor or other care provider to review them with you.

## 2017-07-14 NOTE — NURSING NOTE
"Amos Walker's goals for this visit include: Right leg recheck   He requests these members of his care team be copied on today's visit information: yes    PCP: Racheal Swift    Referring Provider:  ESTABLISHED PATIENT  No address on file    Chief Complaint   Patient presents with     RECHECK     Right leg wound check        Initial /61  Pulse 98  SpO2 99% Estimated body mass index is 23.05 kg/(m^2) as calculated from the following:    Height as of 7/6/17: 1.892 m (6' 2.5\").    Weight as of 7/6/17: 82.6 kg (182 lb).  Medication Reconciliation: complete    "

## 2017-07-27 ENCOUNTER — MYC REFILL (OUTPATIENT)
Dept: INTERNAL MEDICINE | Facility: CLINIC | Age: 72
End: 2017-07-27

## 2017-07-27 DIAGNOSIS — N52.9 VASCULOGENIC ERECTILE DYSFUNCTION, UNSPECIFIED VASCULOGENIC ERECTILE DYSFUNCTION TYPE: ICD-10-CM

## 2017-07-27 DIAGNOSIS — I10 ESSENTIAL HYPERTENSION WITH GOAL BLOOD PRESSURE LESS THAN 140/90: ICD-10-CM

## 2017-07-28 ENCOUNTER — OFFICE VISIT (OUTPATIENT)
Dept: PODIATRY | Facility: CLINIC | Age: 72
End: 2017-07-28
Payer: MEDICARE

## 2017-07-28 VITALS — DIASTOLIC BLOOD PRESSURE: 61 MMHG | SYSTOLIC BLOOD PRESSURE: 109 MMHG | HEART RATE: 97 BPM | OXYGEN SATURATION: 95 %

## 2017-07-28 DIAGNOSIS — L97.912 ULCER OF RIGHT LOWER LEG, WITH FAT LAYER EXPOSED (H): Primary | ICD-10-CM

## 2017-07-28 DIAGNOSIS — I87.8 VENOUS STASIS: ICD-10-CM

## 2017-07-28 PROCEDURE — 99212 OFFICE O/P EST SF 10 MIN: CPT | Performed by: PODIATRIST

## 2017-07-28 RX ORDER — LISINOPRIL 40 MG/1
40 TABLET ORAL DAILY
Qty: 90 TABLET | Refills: 3 | Status: ON HOLD | OUTPATIENT
Start: 2017-07-28 | End: 2017-08-25

## 2017-07-28 NOTE — PATIENT INSTRUCTIONS
Thanks for coming today.  Ortho[facility] Medicine Clinic  [address] [place] [address]    To schedule future appointments in Ortho Clinic, you may call [phone].    To schedule ordered imaging by your Provider: Call [facility] at [phone]    [employer] available online at:   [email or web address]    Please call if any further questions or concerns [phone] and ask for the Orthopedic Department. Clinic hours 8 am to 5 pm.    Return to clinic if symptoms worsen.

## 2017-07-28 NOTE — MR AVS SNAPSHOT
After Visit Summary   7/28/2017    Amos Walker    MRN: [id number]           Patient Information     Date Of Birth          1945        Visit Information        Provider Department      7/28/2017 10:00 AM Brayan Mcclain DPM [facility]        Today's Diagnoses     Ulcer of right lower leg, with fat layer exposed (H)    -  1    Venous stasis          Care Instructions    Thanks for coming today.  [facility] Clinic  [address] [place] [address]    To schedule future appointments in Ortho Clinic, you may call [phone].    To schedule ordered imaging by your Provider: Call [facility] at [phone]    Delivery Agent available online at:   [email or web address]    Please call if any further questions or concerns [phone] and ask for the Orthopedic Department. Clinic hours 8 am to 5 pm.    Return to clinic if symptoms worsen.            Follow-ups after your visit        Your next 10 appointments already scheduled     Sep 18, 2017 10:30 AM CDT   (Arrive by 10:15 AM)   Return Visit with Racheal Swift MD   [facility] ([facility])    [address]  [address] [facility] [id number] [phone]            Jun 20, 2018 10:30 AM CDT   RETURN RETINA with Jen Aranda MD   Eye Clinic ([facility])    [facility]  [address] [facility] [address] [facility]  [address] [address]  [facility] [id number] [phone]              Who to contact     If you have questions or need follow up information about today's clinic visit or your schedule please contact [facility] directly at [phone].  Normal or non-critical lab and imaging results will be communicated to you by MyChart, letter or phone within 4 business days after the clinic has received the results. If you do not hear from us within 7 days, please contact the clinic through MyChart or phone. If you have a  critical or abnormal lab result, we will notify you by phone as soon as possible.  Submit refill requests through [employer] or call your pharmacy and they will forward the refill request to us. Please allow 3 business days for your refill to be completed.          Additional Information About Your Visit        [employer] Information     [employer] gives you secure access to your electronic health record. If you see a primary care provider, you can also send messages to your care team and make appointments. If you have questions, please call your primary care clinic.  If you do not have a primary care provider, please call [phone] and they will assist you.      [employer] is an electronic gateway that provides easy, online access to your medical records. With [employer], you can request a clinic appointment, read your test results, renew a prescription or communicate with your care team.     To access your existing account, please contact your [facility] or call [phone] for assistance.        Care EveryWhere ID     This is your Care EveryWhere ID. This could be used by other organizations to access your [place] medical records  [id number]        Your Vitals Were     Pulse Pulse Oximetry                97 95%           Blood Pressure from Last 3 Encounters:   07/28/17 109/61   07/14/17 137/61   07/06/17 97/59    Weight from Last 3 Encounters:   07/06/17 82.6 kg (182 lb)   06/29/17 83.3 kg (183 lb 9.6 oz)   06/16/17 82.5 kg (181 lb 12.8 oz)              Today, you had the following     No orders found for display         Today's Medication Changes          These changes are accurate as of: 7/28/17 11:59 PM.  If you have any questions, ask your nurse or doctor.               These medicines have changed or have updated prescriptions.        Dose/Directions    hydrochlorothiazide 12.5 MG Tabs tablet   This may have changed:  when to take this   Used for:  Benign essential hypertension         Dose:  12.5 mg   Take 1 tablet (12.5 mg) by mouth daily   Quantity:  90 tablet   Refills:  3                Primary Care Provider Office Phone # Fax #    Racheal Swift [phone]       [address] [address]        Equal Access to Services     SAMSON MELTON : Hadii aad ku hadasho Soomaali, waaxda luqadaha, qaybta kaalmada adeegyada, waxay idiin hayaan adeeg kharash la'alann ah. So [facility] [phone].    ATENCIÓN: Si habla español, tiene a rodrigues disposición servicios gratuitos de asistencia lingüística. Timmyame al [phone].    We comply with applicable federal civil rights laws and [place] laws. We do not discriminate on the basis of race, color, national origin, age, disability sex, sexual orientation or gender identity.            Thank you!     Thank you for choosing [facility]  for your care. Our goal is always to provide you with excellent care. Hearing back from our patients is one way we can continue to improve our services. Please take a few minutes to complete the written survey that you may receive in the mail after your visit with us. Thank you!             Your Updated Medication List - Protect others around you: Learn how to safely use, store and throw away your medicines at www.disposemymeds.org.          This list is accurate as of: 7/28/17 11:59 PM.  Always use your most recent med list.                   Brand Name Dispense Instructions for use Diagnosis    * ammonium lactate 12 % cream    AMLACTIN    300 g    Apply twice daily to legs    Dermatitis       * ammonium lactate 12 % cream    LAC-HYDRIN    385 g    Apply topically 2 times daily as needed for dry skin    Diabetic neuropathy with neurologic complication (H), Venous stasis       ascorbic acid 1000 MG Tabs    vitamin C     Take 1,000 mg by mouth daily        aspirin 81 MG chewable tablet      Take 81 mg by mouth daily        blood glucose monitoring lancets     3 Box     Use to test blood sugars 2 as directed.    Type 2 diabetes, uncontrolled, with neuropathy (H)       blood glucose monitoring test strip    ONE TOUCH ULTRA    60 strip    Use to test blood sugars 2 times daily or as directed.    Type 2 diabetes mellitus (H)       ciprofloxacin-dexamethasone otic suspension    CIPRODEX    1 Bottle    Place 4 drops into both ears 2 times daily    Otitis externa       clindamycin 300 MG capsule    CLEOCIN    30 capsule    Take 1 capsule (300 mg) by mouth 3 times daily    Ulcer of right leg, with fat layer exposed (H), Venous stasis       clopidogrel 75 MG tablet    PLAVIX    30 tablet    Take 1 tablet (75 mg) by mouth daily    Peripheral vascular disease, unspecified (H)       * econazole nitrate 1 % cream     85 g    Apply topically 2 times daily        * econazole nitrate 1 % cream     85 g    Apply topically 2 times daily    Unspecified venous (peripheral) insufficiency       ferrous sulfate 325 (65 FE) MG tablet    IRON    60 tablet    Take 1 tablet (325 mg) by mouth 2 times daily    Peripheral vascular disease, unspecified (H)       * gentamicin 0.1 % ointment    GARAMYCIN    30 g    Apply topically daily To right leg ulcer daily.        * gentamicin 0.1 % cream    GARAMYCIN    30 g    Apply topically 2 times daily To right leg ulcer.    Ulcer of right lower leg, with fat layer exposed (H), Venous stasis       hydrochlorothiazide 12.5 MG Tabs tablet     90 tablet    Take 1 tablet (12.5 mg) by mouth daily    Benign essential hypertension       hydrocortisone 0.2 % cream    WESTCORT    15 g    Apply sparingly to affected area twice times daily for 14 days.    Dermatitis       insulin glargine 100 UNIT/ML injection    LANTUS SOLOSTAR    9 mL    Inject 25 Units Subcutaneous At Bedtime    Type 2 diabetes mellitus with diabetic peripheral angiopathy without gangrene, with long-term current use of insulin (H)       insulin pen needle 31G X 8 MM    B-D U/F    100 each    Use 1 daily o  "as directed    Diabetes mellitus, type II (H)       * ketoconazole 2 % cream    NIZORAL    60 g    Apply topically 2 times daily    Tinea pedis of both feet       * ketoconazole 2 % cream    NIZORAL    60 g    Apply topically 2 times daily To toenails.    Dermatophytosis of nail       * levofloxacin 750 MG tablet    LEVAQUIN    14 tablet    Take 1 tablet (750 mg) by mouth daily    Ulcer of right lower leg, with fat layer exposed (H), Venous stasis       * levofloxacin 750 MG tablet    LEVAQUIN    14 tablet    Take 1 tablet (750 mg) by mouth daily    Ulcer of right leg, with fat layer exposed (H), Venous stasis       lisinopril 40 MG tablet    PRINIVIL/ZESTRIL    90 tablet    Take 1 tablet (40 mg) by mouth daily    Essential hypertension with goal blood pressure less than 140/90       mupirocin 2 % cream    BACTROBAN    30 g    Apply topically 2 times daily To right leg ulcer.    Ulcer of right lower leg, with fat layer exposed (H), Venous stasis       mupirocin 2 % ointment    BACTROBAN    22 g    Apply topically daily    Ulcer of ankle, right, with fat layer exposed (H), Unspecified venous (peripheral) insufficiency       nateglinide 120 MG tablet    STARLIX    90 tablet    TAKE 1 TABLET BY MOUTH THREE TIMES DAILY BEFORE MEALS    Diabetes mellitus (H)       nystatin Powd     60 g    Apply twice daily to feet    Dermatophytosis of foot       nystatin-triamcinolone cream    MYCOLOG II    60 g    Apply topically 2 times daily    Tinea pedis of both feet, Type 2 diabetes mellitus with peripheral neuropathy (H)       OPTIFOAM 6\"X6\" Pads     1 each    1 Box once a week    Ulcer of right leg, with fat layer exposed (H)       * order for DME     2 each    Please measure and distribute 1 pair of 20mm Hg - 30mm Hg knee high ULCER CARE open or closed toe compression stockings.  Patient has a size 13 foot and please take this into consideration.  Jobst or equivalent    Varicose veins of lower extremities with other " complications, Venous stasis ulcer of right lower extremity (H)       * order for DME     3 each    Please measure and distribute 1 pair of 20mmHg - 30mmHg knee high open or closed toe compression stockings. Jobst ultrasheer or equivalent.    Varicose veins of both lower extremities with complications       * order for DME     2 each    Please measure and distribute 1 pair of 30mmHg - 40mmHg knee high open toe ulcercare compression stockings. Jobst ultrasheer or equivalent.    Varicose veins of bilateral lower extremities with other complications       oxyCODONE 5 MG IR tablet    ROXICODONE    20 tablet    Take 1 tablet (5 mg) by mouth every 4 hours as needed for moderate to severe pain    Venous stasis ulcer of right lower extremity (H), Varicose veins of lower extremities with other complications       sildenafil 50 MG cap/tab    REVATIO/VIAGRA    10 tablet    Take 1 tablet (50 mg) by mouth daily as needed for erectile dysfunction    Vasculogenic erectile dysfunction, unspecified vasculogenic erectile dysfunction type       simvastatin 10 MG tablet    ZOCOR    90 tablet    Take 1 tablet (10 mg) by mouth At Bedtime    Type 2 diabetes, controlled, with neuropathy (H)       sitagliptin 100 MG tablet    JANUVIA    90 tablet    Take 1 tablet (100 mg) by mouth daily    Type 2 diabetes, HbA1c goal < 7% (H)       SSD 1 % cream   Generic drug:  silver sulfADIAZINE           triamcinolone 0.1 % cream    KENALOG    453.6 g    Apply sparingly daily to affected areas on feet and legs.    Venous stasis       VITAMIN D3 PO      Take by mouth daily        * Notice:  This list has 13 medication(s) that are the same as other medications prescribed for you. Read the directions carefully, and ask your doctor or other care provider to review them with you.

## 2017-07-28 NOTE — TELEPHONE ENCOUNTER
Lisdinopril      Last Written Prescription Date: 6-27-16  Last Fill Quantity: 90, # refills: 3  Last Office Visit : 6-16-17  Next Clinic Visit: 9-18-17      Per 6-16-17 note:  -Continue lisinopril 40 mg qday     Potassium   Date Value Ref Range Status   02/06/2017 4.6 3.4 - 5.3 mmol/L Final     Creatinine   Date Value Ref Range Status   02/06/2017 1.28 (H) 0.66 - 1.25 mg/dL Final     BP Readings from Last 3 Encounters:   07/28/17 109/61   07/14/17 137/61   07/06/17 97/59     Kathleen M Doege RN

## 2017-07-28 NOTE — TELEPHONE ENCOUNTER
Message from Gabriela:  Laverne Brush RN Fri Jul 28, 2017 8:00 AM        ----- Message -----   From: Amos Walker   Sent: 7/27/2017 10:33 PM   To: [facility] Nursing Staff-  Subject: Medication Renewal Request     Original authorizing provider: MD Amos Srivastava would like a refill of the following medications:  lisinopril (PRINIVIL,ZESTRIL) 40 MG tablet [Silvina Patiño MD]    Preferred pharmacy: [facility] [address] [place] [address] AVE [address] [facility]    Comment:
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
## 2017-08-11 ENCOUNTER — MYC MEDICAL ADVICE (OUTPATIENT)
Dept: ORTHOPEDICS | Facility: CLINIC | Age: 72
End: 2017-08-11

## 2017-08-14 NOTE — TELEPHONE ENCOUNTER
Attempted to contact patient in regards to scheduling an appointment to see . Unable to reach patient at this time. Left a message with his wife and asked that he call back to schedule a follow up. Clinic number provided. Please advise. Kristin Vital-Surgery Scheduler

## 2017-08-22 ENCOUNTER — OFFICE VISIT (OUTPATIENT)
Dept: PODIATRY | Facility: CLINIC | Age: 72
End: 2017-08-22
Payer: MEDICARE

## 2017-08-22 VITALS — HEART RATE: 95 BPM | OXYGEN SATURATION: 96 % | DIASTOLIC BLOOD PRESSURE: 62 MMHG | SYSTOLIC BLOOD PRESSURE: 116 MMHG

## 2017-08-22 DIAGNOSIS — E11.49 DIABETIC NEUROPATHY WITH NEUROLOGIC COMPLICATION (H): ICD-10-CM

## 2017-08-22 DIAGNOSIS — E11.40 DIABETIC NEUROPATHY WITH NEUROLOGIC COMPLICATION (H): ICD-10-CM

## 2017-08-22 DIAGNOSIS — L97.912 ULCER OF RIGHT LOWER LEG, WITH FAT LAYER EXPOSED (H): Primary | ICD-10-CM

## 2017-08-22 PROCEDURE — 97597 DBRDMT OPN WND 1ST 20 CM/<: CPT | Performed by: PODIATRIST

## 2017-08-22 RX ORDER — GENTAMICIN SULFATE 1 MG/G
CREAM TOPICAL DAILY
Qty: 30 G | Refills: 5 | Status: ON HOLD | OUTPATIENT
Start: 2017-08-22 | End: 2017-08-30

## 2017-08-22 RX ORDER — SILVER SULFADIAZINE 10 MG/G
CREAM TOPICAL DAILY
Qty: 85 G | Refills: 5 | Status: ON HOLD | OUTPATIENT
Start: 2017-08-22 | End: 2017-08-30
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
## 2017-08-22 NOTE — MR AVS SNAPSHOT
After Visit Summary   8/22/2017    Amos Walker    MRN: [id number]           Patient Information     Date Of Birth          1945        Visit Information        Provider Department      8/22/2017 1:30 PM Brayan Mcclain DPM [facility]        Today's Diagnoses     Ulcer of right lower leg, with fat layer exposed (H)    -  1    Chronic venous hypertension with ulcer involving right side (H)        Diabetic neuropathy with neurologic complication (H)          Care Instructions    Thanks for coming today.  Ortho[facility] Medicine Clinic  [address] [place] [address]    To schedule future appointments in Ortho Clinic, you may call [phone].    To schedule ordered imaging by your Provider: Call [facility] at [phone]    [employer] available online at:   [email or web address]    Please call if any further questions or concerns [phone] and ask for the Orthopedic Department. Clinic hours 8 am to 5 pm.    Return to clinic if symptoms worsen.            Follow-ups after your visit        Your next 10 appointments already scheduled     Sep 05, 2017 10:15 AM CDT   Return Visit with Brayan Mcclain DPM   [facility]    [id number] [place] [address] [phone]            Sep 18, 2017 10:30 AM CDT   (Arrive by 10:15 AM)   Return Visit with Racheal Swift MD   [facility] ([facility])    [address]  [address] [facility] [address] [phone]            Jun 20, 2018 10:30 AM CDT   RETURN RETINA with Jen Aranda MD   [facility] ([facility])    [address]  [address] [facility]  [address]  [facility] [id number] [phone]              Who to contact     If you have questions or need follow up information about today's clinic visit or your schedule please contact [facility] directly at [phone].  Normal or non-critical lab and imaging results will be communicated to you by [employer], letter or phone within 4 business days after the clinic has received the results. If you do not hear from us within 7 days, please contact the clinic through [employer] or phone. If you have a critical or abnormal lab result, we will notify you by phone as soon as possible.  Submit refill requests through [employer] or call your pharmacy and they will forward the refill request to us. Please allow 3 business days for your refill to be completed.          Additional Information About Your Visit        [employer] Information     [employer] gives you secure access to your electronic health record. If you see a primary care provider, you can also send messages to your care team and make appointments. If you have questions, please call your primary care clinic.  If you do not have a primary care provider, please call [phone] and they will assist you.      [employer] is an electronic gateway that provides easy, online access to your medical records. With [employer], you can request a clinic appointment, read your test results, renew a prescription or communicate with your care team.     To access your existing account, please contact your [facility] or call [phone] for assistance.        Care EveryWhere ID     This is your Care EveryWhere ID. This could be used by other organizations to access your [place] medical records  [id number]        Your Vitals Were     Pulse Pulse Oximetry                95 96%           Blood Pressure from Last 3 Encounters:   08/22/17 116/62   07/28/17 109/61   07/14/17 137/61    Weight from Last 3 Encounters:   07/06/17 82.6 kg (182 lb)   06/29/17 83.3 kg (183 lb 9.6 oz)   06/16/17 82.5 kg (181 lb 12.8 oz)              We Performed the Following     DEBRIDEMENT WOUND UP TO 20 SQ CM          Today's Medication Changes          These changes  are accurate as of: 8/22/17  4:15 PM.  If you have any questions, ask your nurse or doctor.               These medicines have changed or have updated prescriptions.        Dose/Directions    * gentamicin 0.1 % ointment   Commonly known as:  GARAMYCIN   This may have changed:  Another medication with the same name was added. Make sure you understand how and when to take each.   Changed by:  Brayan Mcclain DPM        Apply topically daily To right leg ulcer daily.   Quantity:  30 g   Refills:  1       * gentamicin 0.1 % cream   Commonly known as:  GARAMYCIN   This may have changed:  Another medication with the same name was added. Make sure you understand how and when to take each.   Used for:  Ulcer of right lower leg, with fat layer exposed (H), Venous stasis   Changed by:  Brayan Mcclain DPM        Apply topically 2 times daily To right leg ulcer.   Quantity:  30 g   Refills:  3       * gentamicin 0.1 % cream   Commonly known as:  GARAMYCIN   This may have changed:  You were already taking a medication with the same name, and this prescription was added. Make sure you understand how and when to take each.   Used for:  Ulcer of right lower leg, with fat layer exposed (H), Chronic venous hypertension with ulcer involving right side (H), Diabetic neuropathy with neurologic complication (H)   Changed by:  Brayan Mcclain DPM        Apply topically daily To right leg ulcer.   Quantity:  30 g   Refills:  5       hydrochlorothiazide 12.5 MG Tabs tablet   This may have changed:  when to take this   Used for:  Benign essential hypertension        Dose:  12.5 mg   Take 1 tablet (12.5 mg) by mouth daily   Quantity:  90 tablet   Refills:  3       * SSD 1 % cream   This may have changed:  Another medication with the same name was added. Make sure you understand how and when to take each.   Generic drug:  silver sulfADIAZINE   Changed by:  Brayan Mcclain DPM        Refills:  0       * silver sulfADIAZINE 1 % cream    Commonly known as:  SILVADENE   This may have changed:  You were already taking a medication with the same name, and this prescription was added. Make sure you understand how and when to take each.   Used for:  Ulcer of right lower leg, with fat layer exposed (H), Chronic venous hypertension with ulcer involving right side (H), Diabetic neuropathy with neurologic complication (H)   Changed by:  Brayan Mcclain DPM        Apply topically daily for 7 days To affected areas on right foot and leg.   Quantity:  85 g   Refills:  5       * Notice:  This list has 5 medication(s) that are the same as other medications prescribed for you. Read the directions carefully, and ask your doctor or other care provider to review them with you.         Where to get your medicines      These medications were sent to [facility] [id number] [place], [place] [address] AT [place] [address], [facility] [address]     Phone:  [phone]     gentamicin 0.1 % cream    silver sulfADIAZINE 1 % cream                Primary Care Provider Office Phone # Fax #    Racheal Swift [phone]       [address] [place] [address] [address] [place] [address]        Equal Access to Services     SAMSON MELTON AH: Hadii niurka ferrell Soger, waaxda luqadaha, qaybta kaalmada adeegyada, yolanda lopez. So [facility] [phone].    ATENCIÓN: Si habla español, tiene a rodrigues disposición servicios gratuitos de asistencia lingüística. Mary al [phone].    We comply with applicable federal civil rights laws and [place] laws. We do not discriminate on the basis of race, color, national origin, age, disability sex, sexual orientation or gender identity.            Thank you!     Thank you for choosing [facility]  for your care. Our goal is always to provide you with excellent care. Hearing back from our patients is one way we can continue to improve our services. Please  take a few minutes to complete the written survey that you may receive in the mail after your visit with us. Thank you!             Your Updated Medication List - Protect others around you: Learn how to safely use, store and throw away your medicines at www.disposemymeds.org.          This list is accurate as of: 8/22/17  4:15 PM.  Always use your most recent med list.                   Brand Name Dispense Instructions for use Diagnosis    * ammonium lactate 12 % cream    AMLACTIN    300 g    Apply twice daily to legs    Dermatitis       * ammonium lactate 12 % cream    LAC-HYDRIN    385 g    Apply topically 2 times daily as needed for dry skin    Diabetic neuropathy with neurologic complication (H), Venous stasis       ascorbic acid 1000 MG Tabs    vitamin C     Take 1,000 mg by mouth daily        aspirin 81 MG chewable tablet      Take 81 mg by mouth daily        blood glucose monitoring lancets     3 Box    Use to test blood sugars 2 as directed.    Type 2 diabetes, uncontrolled, with neuropathy (H)       blood glucose monitoring test strip    ONE TOUCH ULTRA    60 strip    Use to test blood sugars 2 times daily or as directed.    Type 2 diabetes mellitus (H)       ciprofloxacin-dexamethasone otic suspension    CIPRODEX    1 Bottle    Place 4 drops into both ears 2 times daily    Otitis externa       clindamycin 300 MG capsule    CLEOCIN    30 capsule    Take 1 capsule (300 mg) by mouth 3 times daily    Ulcer of right leg, with fat layer exposed (H), Venous stasis       clopidogrel 75 MG tablet    PLAVIX    30 tablet    Take 1 tablet (75 mg) by mouth daily    Peripheral vascular disease, unspecified (H)       * econazole nitrate 1 % cream     85 g    Apply topically 2 times daily        * econazole nitrate 1 % cream     85 g    Apply topically 2 times daily    Unspecified venous (peripheral) insufficiency       ferrous sulfate 325 (65 FE) MG tablet    IRON    60 tablet    Take 1 tablet (325 mg) by mouth 2 times  daily    Peripheral vascular disease, unspecified (H)       * gentamicin 0.1 % ointment    GARAMYCIN    30 g    Apply topically daily To right leg ulcer daily.        * gentamicin 0.1 % cream    GARAMYCIN    30 g    Apply topically 2 times daily To right leg ulcer.    Ulcer of right lower leg, with fat layer exposed (H), Venous stasis       * gentamicin 0.1 % cream    GARAMYCIN    30 g    Apply topically daily To right leg ulcer.    Ulcer of right lower leg, with fat layer exposed (H), Chronic venous hypertension with ulcer involving right side (H), Diabetic neuropathy with neurologic complication (H)       hydrochlorothiazide 12.5 MG Tabs tablet     90 tablet    Take 1 tablet (12.5 mg) by mouth daily    Benign essential hypertension       hydrocortisone 0.2 % cream    WESTCORT    15 g    Apply sparingly to affected area twice times daily for 14 days.    Dermatitis       insulin glargine 100 UNIT/ML injection    LANTUS SOLOSTAR    9 mL    Inject 25 Units Subcutaneous At Bedtime    Type 2 diabetes mellitus with diabetic peripheral angiopathy without gangrene, with long-term current use of insulin (H)       insulin pen needle 31G X 8 MM    B-D U/F    100 each    Use 1 daily o as directed    Diabetes mellitus, type II (H)       * ketoconazole 2 % cream    NIZORAL    60 g    Apply topically 2 times daily    Tinea pedis of both feet       * ketoconazole 2 % cream    NIZORAL    60 g    Apply topically 2 times daily To toenails.    Dermatophytosis of nail       * levofloxacin 750 MG tablet    LEVAQUIN    14 tablet    Take 1 tablet (750 mg) by mouth daily    Ulcer of right lower leg, with fat layer exposed (H), Venous stasis       * levofloxacin 750 MG tablet    LEVAQUIN    14 tablet    Take 1 tablet (750 mg) by mouth daily    Ulcer of right leg, with fat layer exposed (H), Venous stasis       lisinopril 40 MG tablet    PRINIVIL/ZESTRIL    90 tablet    Take 1 tablet (40 mg) by mouth daily    Essential hypertension with goal  "blood pressure less than 140/90       mupirocin 2 % cream    BACTROBAN    30 g    Apply topically 2 times daily To right leg ulcer.    Ulcer of right lower leg, with fat layer exposed (H), Venous stasis       mupirocin 2 % ointment    BACTROBAN    22 g    Apply topically daily    Ulcer of ankle, right, with fat layer exposed (H), Unspecified venous (peripheral) insufficiency       nateglinide 120 MG tablet    STARLIX    90 tablet    TAKE 1 TABLET BY MOUTH THREE TIMES DAILY BEFORE MEALS    Diabetes mellitus (H)       nystatin Powd     60 g    Apply twice daily to feet    Dermatophytosis of foot       nystatin-triamcinolone cream    MYCOLOG II    60 g    Apply topically 2 times daily    Tinea pedis of both feet, Type 2 diabetes mellitus with peripheral neuropathy (H)       OPTIFOAM 6\"X6\" Pads     1 each    1 Box once a week    Ulcer of right leg, with fat layer exposed (H)       * order for DME     2 each    Please measure and distribute 1 pair of 20mm Hg - 30mm Hg knee high ULCER CARE open or closed toe compression stockings.  Patient has a size 13 foot and please take this into consideration.  Jobst or equivalent    Varicose veins of lower extremities with other complications, Venous stasis ulcer of right lower extremity (H)       * order for DME     3 each    Please measure and distribute 1 pair of 20mmHg - 30mmHg knee high open or closed toe compression stockings. Jobst ultrasheer or equivalent.    Varicose veins of both lower extremities with complications       * order for DME     2 each    Please measure and distribute 1 pair of 30mmHg - 40mmHg knee high open toe ulcercare compression stockings. Jobst ultrasheer or equivalent.    Varicose veins of bilateral lower extremities with other complications       oxyCODONE 5 MG IR tablet    ROXICODONE    20 tablet    Take 1 tablet (5 mg) by mouth every 4 hours as needed for moderate to severe pain    Venous stasis ulcer of right lower extremity (H), Varicose veins of " lower extremities with other complications       sildenafil 50 MG cap/tab    REVATIO/VIAGRA    10 tablet    Take 1 tablet (50 mg) by mouth daily as needed for erectile dysfunction    Vasculogenic erectile dysfunction, unspecified vasculogenic erectile dysfunction type       simvastatin 10 MG tablet    ZOCOR    90 tablet    Take 1 tablet (10 mg) by mouth At Bedtime    Type 2 diabetes, controlled, with neuropathy (H)       sitagliptin 100 MG tablet    JANUVIA    90 tablet    Take 1 tablet (100 mg) by mouth daily    Type 2 diabetes, HbA1c goal < 7% (H)       * SSD 1 % cream   Generic drug:  silver sulfADIAZINE           * silver sulfADIAZINE 1 % cream    SILVADENE    85 g    Apply topically daily for 7 days To affected areas on right foot and leg.    Ulcer of right lower leg, with fat layer exposed (H), Chronic venous hypertension with ulcer involving right side (H), Diabetic neuropathy with neurologic complication (H)       triamcinolone 0.1 % cream    KENALOG    453.6 g    Apply sparingly daily to affected areas on feet and legs.    Venous stasis       VITAMIN D3 PO      Take by mouth daily        * Notice:  This list has 16 medication(s) that are the same as other medications prescribed for you. Read the directions carefully, and ask your doctor or other care provider to review them with you.

## 2017-08-22 NOTE — NURSING NOTE
"Amos Walker's goals for this visit include: Right leg check   He requests these members of his care team be copied on today's visit information: yes    PCP: Racheal Swift    Referring Provider:  ESTABLISHED PATIENT  No address on file    Chief Complaint   Patient presents with     RECHECK     right leg recheck        Initial /62  Pulse 95  SpO2 96% Estimated body mass index is 23.05 kg/(m^2) as calculated from the following:    Height as of 7/6/17: 1.892 m (6' 2.5\").    Weight as of 7/6/17: 82.6 kg (182 lb).  Medication Reconciliation: complete    "

## 2017-08-22 NOTE — PATIENT INSTRUCTIONS
Thanks for coming today.  Ortho/Sports Medicine Clinic  [address] [place] [address]    To schedule future appointments in Ortho Clinic, you may call [phone].    To schedule ordered imaging by your Provider: Call [facility] at [phone]    [employer] available online at:   [email or web address]    Please call if any further questions or concerns [phone] and ask for the Orthopedic Department. Clinic hours 8 am to 5 pm.    Return to clinic if symptoms worsen.

## 2017-08-23 ENCOUNTER — TELEPHONE (OUTPATIENT)
Dept: PODIATRY | Facility: CLINIC | Age: 72
End: 2017-08-23

## 2017-08-23 NOTE — TELEPHONE ENCOUNTER
Financial Counselor Review for Apligraf, Dermagraft or Puraply AM:    Procedure to be performed (include CPT code):Yes Apligraf    Diagnosis code (include ICD-10 code):L97.912; I87.311; E11.40; E11.49    Coverage and patient financial responsibility information:YES    Does patient need to be contacted by Financial Counselor:YES    Has the patient tried Apligraf, Dermagraft or Puraply before    Note: Do not use abbreviations and route encounter to  Podiatry pool

## 2017-08-24 ENCOUNTER — APPOINTMENT (OUTPATIENT)
Dept: GENERAL RADIOLOGY | Facility: CLINIC | Age: 72
DRG: 470 | End: 2017-08-24
Payer: MEDICARE

## 2017-08-24 ENCOUNTER — HOSPITAL ENCOUNTER (INPATIENT)
Facility: CLINIC | Age: 72
LOS: 5 days | Discharge: NURSING, REHAB OR LONG-TERM CARE | DRG: 470 | End: 2017-08-30
Attending: EMERGENCY MEDICINE | Admitting: SURGERY
Payer: MEDICARE

## 2017-08-24 DIAGNOSIS — L97.912 ULCER OF RIGHT LOWER LEG, WITH FAT LAYER EXPOSED (H): ICD-10-CM

## 2017-08-24 DIAGNOSIS — I73.9 PERIPHERAL VASCULAR DISEASE, UNSPECIFIED (H): ICD-10-CM

## 2017-08-24 DIAGNOSIS — E11.9 DIABETES MELLITUS (H): ICD-10-CM

## 2017-08-24 DIAGNOSIS — L30.9 DERMATITIS: ICD-10-CM

## 2017-08-24 DIAGNOSIS — E11.51 DIABETES MELLITUS WITH PERIPHERAL VASCULAR DISEASE (H): ICD-10-CM

## 2017-08-24 DIAGNOSIS — E11.40 TYPE 2 DIABETES, CONTROLLED, WITH NEUROPATHY (H): ICD-10-CM

## 2017-08-24 DIAGNOSIS — K59.00 CONSTIPATION, UNSPECIFIED CONSTIPATION TYPE: ICD-10-CM

## 2017-08-24 DIAGNOSIS — E11.49 DIABETIC NEUROPATHY WITH NEUROLOGIC COMPLICATION (H): ICD-10-CM

## 2017-08-24 DIAGNOSIS — E11.40 DIABETIC NEUROPATHY WITH NEUROLOGIC COMPLICATION (H): ICD-10-CM

## 2017-08-24 DIAGNOSIS — E11.51 TYPE 2 DIABETES MELLITUS WITH DIABETIC PERIPHERAL ANGIOPATHY WITHOUT GANGRENE, WITH LONG-TERM CURRENT USE OF INSULIN (H): ICD-10-CM

## 2017-08-24 DIAGNOSIS — I87.8 VENOUS STASIS: ICD-10-CM

## 2017-08-24 DIAGNOSIS — Z79.4 TYPE 2 DIABETES MELLITUS WITH DIABETIC PERIPHERAL ANGIOPATHY WITHOUT GANGRENE, WITH LONG-TERM CURRENT USE OF INSULIN (H): ICD-10-CM
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
## 2017-08-24 DIAGNOSIS — S72.001A CLOSED FRACTURE OF NECK OF RIGHT FEMUR, INITIAL ENCOUNTER (H): Primary | ICD-10-CM

## 2017-08-24 DIAGNOSIS — E11.9 TYPE 2 DIABETES, HBA1C GOAL < 7% (H): ICD-10-CM

## 2017-08-24 LAB
ALBUMIN UR-MCNC: NEGATIVE MG/DL
ANION GAP SERPL CALCULATED.3IONS-SCNC: 8 MMOL/L (ref 3–14)
APPEARANCE UR: CLEAR
BASOPHILS # BLD AUTO: 0 10E9/L (ref 0–0.2)
BASOPHILS NFR BLD AUTO: 0.4 %
BILIRUB UR QL STRIP: NEGATIVE
BUN SERPL-MCNC: 33 MG/DL (ref 7–30)
CALCIUM SERPL-MCNC: 8.2 MG/DL (ref 8.5–10.1)
CHLORIDE SERPL-SCNC: 106 MMOL/L (ref 94–109)
CO2 SERPL-SCNC: 23 MMOL/L (ref 20–32)
COLOR UR AUTO: ABNORMAL
CREAT SERPL-MCNC: 1.64 MG/DL (ref 0.66–1.25)
DIFFERENTIAL METHOD BLD: ABNORMAL
EOSINOPHIL # BLD AUTO: 0.1 10E9/L (ref 0–0.7)
EOSINOPHIL NFR BLD AUTO: 1.2 %
ERYTHROCYTE [DISTWIDTH] IN BLOOD BY AUTOMATED COUNT: 13.2 % (ref 10–15)
GFR SERPL CREATININE-BSD FRML MDRD: 42 ML/MIN/1.7M2
GLUCOSE BLDC GLUCOMTR-MCNC: 182 MG/DL (ref 70–99)
GLUCOSE SERPL-MCNC: 148 MG/DL (ref 70–99)
GLUCOSE UR STRIP-MCNC: NEGATIVE MG/DL
HCT VFR BLD AUTO: 33.9 % (ref 40–53)
HGB BLD-MCNC: 11.5 G/DL (ref 13.3–17.7)
HGB UR QL STRIP: NEGATIVE
IMM GRANULOCYTES # BLD: 0.1 10E9/L (ref 0–0.4)
IMM GRANULOCYTES NFR BLD: 1.6 %
KETONES UR STRIP-MCNC: 5 MG/DL
LEUKOCYTE ESTERASE UR QL STRIP: NEGATIVE
LYMPHOCYTES # BLD AUTO: 1 10E9/L (ref 0.8–5.3)
LYMPHOCYTES NFR BLD AUTO: 12.5 %
MCH RBC QN AUTO: 32 PG (ref 26.5–33)
MCHC RBC AUTO-ENTMCNC: 33.9 G/DL (ref 31.5–36.5)
MCV RBC AUTO: 94 FL (ref 78–100)
MONOCYTES # BLD AUTO: 0.5 10E9/L (ref 0–1.3)
MONOCYTES NFR BLD AUTO: 6.2 %
NEUTROPHILS # BLD AUTO: 6.3 10E9/L (ref 1.6–8.3)
NEUTROPHILS NFR BLD AUTO: 78.1 %
NITRATE UR QL: NEGATIVE
NRBC # BLD AUTO: 0 10*3/UL
NRBC BLD AUTO-RTO: 0 /100
PH UR STRIP: 6 PH (ref 5–7)
PLATELET # BLD AUTO: 175 10E9/L (ref 150–450)
POTASSIUM SERPL-SCNC: 4.2 MMOL/L (ref 3.4–5.3)
RBC # BLD AUTO: 3.59 10E12/L (ref 4.4–5.9)
RBC #/AREA URNS AUTO: 1 /HPF (ref 0–2)
SODIUM SERPL-SCNC: 136 MMOL/L (ref 133–144)
SOURCE: ABNORMAL
SP GR UR STRIP: 1.01 (ref 1–1.03)
UROBILINOGEN UR STRIP-MCNC: NORMAL MG/DL (ref 0–2)
WBC # BLD AUTO: 8 10E9/L (ref 4–11)
WBC #/AREA URNS AUTO: <1 /HPF (ref 0–2)

## 2017-08-24 PROCEDURE — 80048 BASIC METABOLIC PNL TOTAL CA: CPT | Performed by: EMERGENCY MEDICINE

## 2017-08-24 PROCEDURE — 72170 X-RAY EXAM OF PELVIS: CPT

## 2017-08-24 PROCEDURE — 99284 EMERGENCY DEPT VISIT MOD MDM: CPT | Mod: GC | Performed by: EMERGENCY MEDICINE

## 2017-08-24 PROCEDURE — 93005 ELECTROCARDIOGRAM TRACING: CPT

## 2017-08-24 PROCEDURE — 96360 HYDRATION IV INFUSION INIT: CPT

## 2017-08-24 PROCEDURE — 81001 URINALYSIS AUTO W/SCOPE: CPT | Performed by: EMERGENCY MEDICINE

## 2017-08-24 PROCEDURE — 99285 EMERGENCY DEPT VISIT HI MDM: CPT | Mod: 25

## 2017-08-24 PROCEDURE — 00000146 ZZHCL STATISTIC GLUCOSE BY METER IP

## 2017-08-24 PROCEDURE — 96361 HYDRATE IV INFUSION ADD-ON: CPT

## 2017-08-24 PROCEDURE — 25000128 H RX IP 250 OP 636: Performed by: STUDENT IN AN ORGANIZED HEALTH CARE EDUCATION/TRAINING PROGRAM

## 2017-08-24 PROCEDURE — 85025 COMPLETE CBC W/AUTO DIFF WBC: CPT | Performed by: EMERGENCY MEDICINE

## 2017-08-24 PROCEDURE — 73502 X-RAY EXAM HIP UNI 2-3 VIEWS: CPT

## 2017-08-24 RX ADMIN — SODIUM CHLORIDE 500 ML: 9 INJECTION, SOLUTION INTRAVENOUS at 22:44

## 2017-08-24 NOTE — IP AVS SNAPSHOT
"    [address] [facility] BANK: [phone]                                              INTERAGENCY TRANSFER FORM - LAB / IMAGING / EKG / EMG RESULTS   2017                    Hospital Admission Date: 2017  RASHEED SORIANO   : 1945  Sex: Male        Attending Provider: Abby Bower MD     Allergies:  Lisinopril, Neomycin, Methylchloroisothiazolinone [Methylisothiazolinone], Povidone Iodine    Infection:  MRSA-Contact Isolation   Service:  TRAUMA    Ht:  1.88 m (6' 2\")   Wt:  89.9 kg (198 lb 1.6 oz)   Admission Wt:  82.1 kg (181 lb)    BMI:  25.43 kg/m 2   BSA:  2.17 m 2            Patient PCP Information     Provider PCP Type    Racheal Swift MD General         Lab Results - 3 Days      Hemoglobin [[id number]]  Resulted: 17 1010, Result status: In process    Ordering provider: Rik Saleh NP  17 0807 Resulting lab: [facility]    Specimen Information    Type Source Collected On   Blood  17 0951            Glucose by meter [[id number]] (Abnormal)  Resulted: 17 0822, Result status: Final result    Ordering provider: Abby Bower MD  17 0748 Resulting lab: POINT OF CARE TEST, GLUCOSE    Specimen Information    Type Source Collected On     17 0748          Components       Value Reference Range Flag Lab   Glucose 148 70 - 99 mg/dL H 170            INR [[id number]] (Abnormal)  Resulted: 17 0822, Result status: Final result    Ordering provider: Afsaneh Merino MD  17 0100 Resulting lab: [facility]    Specimen Information    Type Source Collected On   Blood  17 0745          Components       Value Reference Range Flag Lab   INR 2.42 0.86 - 1.14 H 51            Glucose by meter [[id number]] (Abnormal)  Resulted: 17 0230, Result status: Final result    Ordering provider: Abby Bower MD  17 0224 Resulting lab: POINT OF CARE TEST, GLUCOSE    Specimen " Information    Type Source Collected On     08/30/17 0224          Components       Value Reference Range Flag Lab   Glucose 181 70 - 99 mg/dL H 170            Glucose by meter [[id number]] (Abnormal)  Resulted: 08/29/17 2231, Result status: Final result    Ordering provider: Abby Bower MD  08/29/17 2159 Resulting lab: POINT OF CARE TEST, GLUCOSE    Specimen Information    Type Source Collected On     08/29/17 2159          Components       Value Reference Range Flag Lab   Glucose 294 70 - 99 mg/dL H 170            Glucose by meter [[id number]] (Abnormal)  Resulted: 08/29/17 2000, Result status: Final result    Ordering provider: Abby Bower MD  08/29/17 1539 Resulting lab: POINT OF CARE TEST, GLUCOSE    Specimen Information    Type Source Collected On     08/29/17 1539          Components       Value Reference Range Flag Lab   Glucose 236 70 - 99 mg/dL H 170            Glucose by meter [[id number]] (Abnormal)  Resulted: 08/29/17 2000, Result status: Final result    Ordering provider: Abby Bower MD  08/29/17 1953 Resulting lab: POINT OF CARE TEST, GLUCOSE    Specimen Information    Type Source Collected On     08/29/17 1953          Components       Value Reference Range Flag Lab   Glucose 209 70 - 99 mg/dL H 170            Glucose by meter [[id number]] (Abnormal)  Resulted: 08/29/17 1201, Result status: Final result    Ordering provider: Abby Bower MD  08/29/17 1148 Resulting lab: POINT OF CARE TEST, GLUCOSE    Specimen Information    Type Source Collected On     08/29/17 1148          Components       Value Reference Range Flag Lab   Glucose 230 70 - 99 mg/dL H 170            Basic metabolic panel [[id number]] (Abnormal)  Resulted: 08/29/17 0914, Result status: Final result    Ordering provider: Rik Saleh NP  08/29/17 0100 Resulting lab: [facility]    Specimen Information    Type Source Collected On    Blood  08/29/17 0756          Components       Value Reference Range Flag Lab   Sodium 133 133 - 144 mmol/L  51   Potassium 3.9 3.4 - 5.3 mmol/L  51   Chloride 101 94 - 109 mmol/L  51   Carbon Dioxide 24 20 - 32 mmol/L  51   Anion Gap 8 3 - 14 mmol/L  51   Glucose 153 70 - 99 mg/dL H 51   Urea Nitrogen 22 7 - 30 mg/dL  51   Creatinine 1.12 0.66 - 1.25 mg/dL  51   GFR Estimate 65 >60 mL/min/1.7m2  51   Comment:  Non  GFR Calc   GFR Estimate If Black 78 >60 mL/min/1.7m2  51   Comment:  African American GFR Calc   Calcium 7.8 8.5 - 10.1 mg/dL L 51            INR [[id number]] (Abnormal)  Resulted: 08/29/17 0913, Result status: Final result    Ordering provider: Afsaneh Merino MD  08/29/17 0100 Resulting lab: [facility]    Specimen Information    Type Source Collected On   Blood  08/29/17 0756          Components       Value Reference Range Flag Lab   INR 1.91 0.86 - 1.14 H 51            Glucose by meter [[id number]] (Abnormal)  Resulted: 08/29/17 0910, Result status: Final result    Ordering provider: Abby Bower MD  08/29/17 0903 Resulting lab: POINT OF CARE TEST, GLUCOSE    Specimen Information    Type Source Collected On     08/29/17 0903          Components       Value Reference Range Flag Lab   Glucose 157 70 - 99 mg/dL H 170            CBC with platelets [[id number]] (Abnormal)  Resulted: 08/29/17 0852, Result status: Final result    Ordering provider: Rik Saleh NP  08/29/17 0100 Resulting lab: [facility]    Specimen Information    Type Source Collected On   Blood  08/29/17 0756          Components       Value Reference Range Flag Lab   WBC 7.4 4.0 - 11.0 10e9/L  51   RBC Count 2.85 4.4 - 5.9 10e12/L L 51   Hemoglobin 8.8 13.3 - 17.7 g/dL L 51   Hematocrit 26.1 40.0 - 53.0 % L 51   MCV 92 78 - 100 fl  51   MCH 30.9 26.5 - 33.0 pg  51   MCHC 33.7 31.5 - 36.5 g/dL  51   RDW 12.7 10.0 - 15.0 %  51    Platelet Count 135 150 - 450 10e9/L L 51            Glucose by meter [[id number]] (Abnormal)  Resulted: 08/29/17 0230, Result status: Final result    Ordering provider: Abby Bower MD  08/29/17 0228 Resulting lab: POINT OF CARE TEST, GLUCOSE    Specimen Information    Type Source Collected On     08/29/17 0228          Components       Value Reference Range Flag Lab   Glucose 178 70 - 99 mg/dL H 170            Glucose by meter [[id number]] (Abnormal)  Resulted: 08/28/17 2130, Result status: Final result    Ordering provider: Abby Bower MD  08/28/17 2112 Resulting lab: POINT OF CARE TEST, GLUCOSE    Specimen Information    Type Source Collected On     08/28/17 2112          Components       Value Reference Range Flag Lab   Glucose 332 70 - 99 mg/dL H 170            Glucose by meter [[id number]] (Abnormal)  Resulted: 08/28/17 1855, Result status: Final result    Ordering provider: Abby Bower MD  08/28/17 1848 Resulting lab: POINT OF CARE TEST, GLUCOSE    Specimen Information    Type Source Collected On     08/28/17 1848          Components       Value Reference Range Flag Lab   Glucose 251 70 - 99 mg/dL H 170            Glucose by meter [[id number]] (Abnormal)  Resulted: 08/28/17 1600, Result status: Final result    Ordering provider: Abby Bower MD  08/28/17 1149 Resulting lab: POINT OF CARE TEST, GLUCOSE    Specimen Information    Type Source Collected On     08/28/17 1149          Components       Value Reference Range Flag Lab   Glucose 292 70 - 99 mg/dL H 170            Magnesium [[id number]] (Abnormal)  Resulted: 08/28/17 1436, Result status: Final result    Ordering provider: Abby Bower MD  08/28/17 1255 Resulting lab: [facility]    Specimen Information    Type Source Collected On   Blood  08/28/17 1411          Components       Value Reference Range Flag Lab   Magnesium 2.5 1.6 - 2.3 mg/dL  H 51            INR [[id number]] (Abnormal)  Resulted: 08/28/17 0836, Result status: Final result    Ordering provider: Afsaneh Merino MD  08/28/17 0100 Resulting lab: [facility]    Specimen Information    Type Source Collected On   Blood  08/28/17 0753          Components       Value Reference Range Flag Lab   INR 1.35 0.86 - 1.14 H 51            Basic metabolic panel [[id number]] (Abnormal)  Resulted: 08/28/17 0831, Result status: Final result    Ordering provider: Sonal Patricio APRN CNP  08/28/17 0100 Resulting lab: [facility]    Specimen Information    Type Source Collected On   Blood  08/28/17 0753          Components       Value Reference Range Flag Lab   Sodium 133 133 - 144 mmol/L  51   Potassium 4.1 3.4 - 5.3 mmol/L  51   Chloride 103 94 - 109 mmol/L  51   Carbon Dioxide 24 20 - 32 mmol/L  51   Anion Gap 6 3 - 14 mmol/L  51   Glucose 174 70 - 99 mg/dL H 51   Urea Nitrogen 19 7 - 30 mg/dL  51   Creatinine 1.06 0.66 - 1.25 mg/dL  51   GFR Estimate 69 >60 mL/min/1.7m2  51   Comment:  Non  GFR Calc   GFR Estimate If Black 83 >60 mL/min/1.7m2  51   Comment:  African American GFR Calc   Calcium 7.6 8.5 - 10.1 mg/dL L 51            Magnesium [[id number]] (Abnormal)  Resulted: 08/28/17 0831, Result status: Final result    Ordering provider: Sonal Patricio APRN CNP  08/28/17 0100 Resulting lab: [facility]    Specimen Information    Type Source Collected On   Blood  08/28/17 0753          Components       Value Reference Range Flag Lab   Magnesium 1.4 1.6 - 2.3 mg/dL L 51            CBC with platelets [[id number]] (Abnormal)  Resulted: 08/28/17 0811, Result status: Final result    Ordering provider: Sonal Patricio APRN CNP  08/28/17 0100 Resulting lab: [facility]    Specimen Information    Type Source Collected On   Blood  08/28/17 0753           Components       Value Reference Range Flag Lab   WBC 8.1 4.0 - 11.0 10e9/L  51   RBC Count 3.04 4.4 - 5.9 10e12/L L 51   Hemoglobin 9.4 13.3 - 17.7 g/dL L 51   Hematocrit 28.2 40.0 - 53.0 % L 51   MCV 93 78 - 100 fl  51   MCH 30.9 26.5 - 33.0 pg  51   MCHC 33.3 31.5 - 36.5 g/dL  51   RDW 12.8 10.0 - 15.0 %  51   Platelet Count 125 150 - 450 10e9/L L 51            Glucose by meter [[id number]] (Abnormal)  Resulted: 08/28/17 0806, Result status: Final result    Ordering provider: Abby Bower MD  08/28/17 0753 Resulting lab: POINT OF CARE TEST, GLUCOSE    Specimen Information    Type Source Collected On     08/28/17 0753          Components       Value Reference Range Flag Lab   Glucose 177 70 - 99 mg/dL H 170            Glucose by meter [[id number]] (Abnormal)  Resulted: 08/28/17 0210, Result status: Final result    Ordering provider: Abby Bower MD  08/28/17 0202 Resulting lab: POINT OF CARE TEST, GLUCOSE    Specimen Information    Type Source Collected On     08/28/17 0202          Components       Value Reference Range Flag Lab   Glucose 207 70 - 99 mg/dL H 170            Glucose by meter [[id number]] (Abnormal)  Resulted: 08/27/17 2220, Result status: Final result    Ordering provider: Abby Bower MD  08/27/17 2205 Resulting lab: POINT OF CARE TEST, GLUCOSE    Specimen Information    Type Source Collected On     08/27/17 2205          Components       Value Reference Range Flag Lab   Glucose 267 70 - 99 mg/dL H 170            Glucose by meter [[id number]] (Abnormal)  Resulted: 08/27/17 1647, Result status: Final result    Ordering provider: Abby Bower MD  08/27/17 1636 Resulting lab: POINT OF CARE TEST, GLUCOSE    Specimen Information    Type Source Collected On     08/27/17 1636          Components       Value Reference Range Flag Lab   Glucose 173 70 - 99 mg/dL H 170            Glucose by meter [[id number]] (Abnormal)  Resulted: 08/27/17 1225,  Result status: Final result    Ordering provider: Abby Bower MD  08/27/17 1219 Resulting lab: POINT OF CARE TEST, GLUCOSE    Specimen Information    Type Source Collected On     08/27/17 1219          Components       Value Reference Range Flag Lab   Glucose 216 70 - 99 mg/dL H 170            Glucose by meter [[id number]] (Abnormal)  Resulted: 08/27/17 0940, Result status: Final result    Ordering provider: Abby Bower MD  08/27/17 0933 Resulting lab: POINT OF CARE TEST, GLUCOSE    Specimen Information    Type Source Collected On     08/27/17 0933          Components       Value Reference Range Flag Lab   Glucose 169 70 - 99 mg/dL H 170            Glucose by meter [[id number]] (Abnormal)  Resulted: 08/27/17 0245, Result status: Final result    Ordering provider: Abby Bower MD  08/27/17 0238 Resulting lab: POINT OF CARE TEST, GLUCOSE    Specimen Information    Type Source Collected On     08/27/17 0238          Components       Value Reference Range Flag Lab   Glucose 198 70 - 99 mg/dL H 170            Glucose by meter [[id number]] (Abnormal)  Resulted: 08/26/17 2230, Result status: Final result    Ordering provider: Brayan Limon MD  08/26/17 2223 Resulting lab: POINT OF CARE TEST, GLUCOSE    Specimen Information    Type Source Collected On     08/26/17 2223          Components       Value Reference Range Flag Lab   Glucose 201 70 - 99 mg/dL H 170            Testing Performed By     Lab - Abbreviation Name Director Address Valid Date Range    45 - [id number] [facility] Unknown Unknown 01/28/02 0000 - Present    51 - Unknown [facility] Unknown [address] [facility] [address] 12/31/14 1010 - Present    170 - Unknown POINT OF CARE TEST, GLUCOSE Unknown Unknown 10/31/11 1114 - Present            Unresulted Labs (24h ago through future)    Start       Ordered    08/28/17 0700  INR  (warfarin (COUMADIN) Pharmacy Consult-INITIAL ORDER)   "DAILY,   Routine      08/27/17 1412    Unscheduled  Magnesium  (Magnesium Replacement - Adult - \"High\" - Replacement for all levels less than or equal to 2 mg/dL)  CONDITIONAL (SPECIFY),   Routine     Comments:  Obtain Magnesium Level for these conditions:  *IF no magnesium result within 24 hrs before initiation of order set, draw magnesium level with next lab collect.    *2 HOURS AFTER last magnesium replacement dose when magnesium replacement given for level less than 1.6  *Next morning after magnesium dose.     Repeat Magnesium Replacement if necessary.    08/28/17 0939    Unscheduled  Potassium  (Potassium Replacement - \"Standard\" - For K levels less than 3.4 mmol/L - UU,UR,UA,RH,SH,PH,WY )  CONDITIONAL (SPECIFY),   Routine     Comments:  Obtain Potassium Level for these conditions:  *IF no potassium result within 24 hours before initiation of order set, draw potassium level with next lab collect.    *2 HOURS AFTER last IV potassium replacement dose and 4 hours after an oral replacement dose.  *Next morning after potassium dose.     Repeat Potassium Replacement if necessary.    08/28/17 0939         Imaging Results - 3 Days      XR Pelvis Port 1/2 Views [[id number]]  Resulted: 08/27/17 1351, Result status: Final result    Ordering provider: Ish Jackman MD  08/27/17 1254 Resulted by: Guillaume Mandujano MD    Performed: 08/27/17 1304 - 08/27/17 1344 Resulting lab: RADIOLOGY RESULTS    Narrative:       History: Status post AP pelvis and PACU. Hip replacement for fracture.    COMPARISON: Intraoperative views of the left hip performed earlier  today.    FINDINGS: New left total hip arthroplasty again noted with screw  fixated acetabular component and noncemented femoral component. Single  cerclage wire about the intertrochanteric region. Gas in the soft  tissues, laterally compatible with immediate postoperative status. No  acute fracture, dislocation or femoral head osteonecrosis changes on  the right. " Marked arterial calcifications about the pelvis and upper  thighs. No acute bony abnormalities about the visualized pelvis and  proximal femurs.      Impression:       IMPRESSION: Satisfactory postoperative appearance of left total hip  arthroplasty.    GUILLAUME MANDUJANO MD      XR Hip Port Left 1 View [[id number]]  Resulted: 08/27/17 1229, Result status: Final result    Ordering provider: Abby Bower MD  08/27/17 1108 Resulted by: Guillaume Mandujano MD    Performed: 08/27/17 1142 - 08/27/17 1142 Resulting lab: RADIOLOGY RESULTS    Narrative:       History: Hip arthroplasty.    COMPARISON: Two-view left hip 8/24/2017.    FINDINGS: There is a new left total hip arthroplasty. Screw fixated  acetabular component and noncemented femoral component. Large soft  tissue defect laterally compatible with intraoperative status. No  fracture deformity about the visualized left hemipelvis and proximal  left femur. Images burned-out about the lateral and medial aspects of  the image. Prominent arterial calcification about the upper thigh.      Impression:       IMPRESSION: Satisfactory intraoperative appearance of left total hip  arthroplasty.    GUILLAUME MANDUJANO MD      Testing Performed By     Lab - Abbreviation Name Director Address Valid Date Range    104 - Rad [facility] RADIOLOGY RESULTS Unknown Unknown 02/16/05 1553 - Present            Encounter-Level Documents:     There are no encounter-level documents.      Order-Level Documents:     There are no order-level documents.

## 2017-08-24 NOTE — IP AVS SNAPSHOT
"` `     [address] [facility]: [phone]                                              INTERAGENCY TRANSFER FORM - NURSING   2017                    Hospital Admission Date: 2017  RASHEED SORIANO   : 1945  Sex: Male        Attending Provider: Abby Bower MD     Allergies:  Lisinopril, Neomycin, Methylchloroisothiazolinone [Methylisothiazolinone], Povidone Iodine    Infection:  MRSA-Contact Isolation   Service:  TRAUMA    Ht:  1.88 m (6' 2\")   Wt:  89.9 kg (198 lb 1.6 oz)   Admission Wt:  82.1 kg (181 lb)    BMI:  25.43 kg/m 2   BSA:  2.17 m 2            Patient PCP Information     Provider PCP Type    Racheal Swift MD General      Current Code Status     Date Active Code Status Order ID Comments User Context       2017  1:53 AM Full Code [id number]  Mayo Nielsen MD Inpatient       Code Status History     Date Active Date Inactive Code Status Order ID Comments User Context    This patient has a current code status but no historical code status.      Advance Directives        Does patient have a scanned Advance Directive/ACP document in EPIC?           No        Hospital Problems as of 2017              Priority Class Noted POA    Fracture of neck of femur (H) Medium  2017 Yes      Non-Hospital Problems as of 2017              Priority Class Noted    Senile nuclear sclerosis Medium  2014    PVD (peripheral vascular disease) (H) Medium  2015    HTN (hypertension) Medium  2015    CKD (chronic kidney disease) stage 3, GFR 30-59 ml/min Medium  2015    Type 2 diabetes, controlled, with neuropathy (H) Medium  3/21/2016    Diabetes mellitus with peripheral vascular disease (H) Medium  2017      Immunizations     Name Date      Influenza (High Dose) 3 valent vaccine 10/31/16     Influenza (High Dose) 3 valent vaccine 09/28/15     Influenza (High Dose) 3 valent vaccine 14     Influenza (IIV3) 13     Pneumococcal (PCV 13) " "11/03/14     Pneumococcal 23 valent 12/21/15     TDAP Vaccine (Boostrix) 03/21/16          END      ASSESSMENT     Discharge Profile Flowsheet     DISCHARGE NEEDS ASSESSMENT     Inspection of bony prominences  Full 08/30/17 0943    Equipment Currently Used at Home  none 08/29/17 1426   Full except areas not inspected   Buttock, left;Buttock, right;Sacrum;Coccyx 08/28/17 0043    Transportation Available  family or friend will provide 08/29/17 1426   Skin WDL  ex 08/30/17 0943    GASTROINTESTINAL (ADULT,PEDIATRIC,OB)     Skin Color/Characteristics  pale;rosa 08/30/17 0943    GI WDL  passing flatus 08/30/17 0943   Skin Temperature  cool 08/30/17 0943    Last Bowel Movement  08/26/17 08/28/17 1635   Skin Integrity  incision(s);scab(s);wound(s);drain/device(s) 08/30/17 0943    Passing flatus  yes 08/30/17 0943   SAFETY      COMMUNICATION ASSESSMENT     Safety WDL  WDL 08/30/17 0956    Patient's communication style  spoken language (English or Bilingual) 08/25/17 0234   Safety Factors  bed in low position;wheels locked;call light in reach;upper side rails raised x 2;ID band on 08/30/17 0956    SKIN     Additional Documentation  -- (RN at bedside. No call light system.) 08/27/17 1243                 Assessment WDL (Within Defined Limits) Definitions           Safety WDL     Effective: 09/28/15    Row Information: <b>WDL Definition:</b> Bed in low position, wheels locked; call light in reach; upper side rails up x 2; ID band on<br> <font color=\"gray\"><i>Item=AS safety wdl>>List=AS safety wdl>>Version=F14</i></font>      Skin WDL     Effective: 09/28/15    Row Information: <b>WDL Definition:</b> Warm; dry; intact; elastic; without discoloration; pressure points without redness<br> <font color=\"gray\"><i>Item=AS skin wdl>>List=AS skin wdl>>Version=F14</i></font>      Vitals     Vital Signs Flowsheet     VITAL SIGNS     Side Effects Monitoring: Respiratory Depth  N 08/30/17 0803    Temp  97.5  F (36.4  C) 08/30/17 0750   Side " "Effects Monitoring: Sedation Level  1 08/30/17 0803    Temp src  Oral 08/30/17 0750   HEIGHT AND WEIGHT      Resp  16 08/30/17 0750   Height  1.88 m (6' 2\") 08/25/17 0235    Pulse  92 08/29/17 0912   Height Method  Stated 08/25/17 0235    Heart Rate  81 08/30/17 0750   Height Method  Stated 08/24/17 2121    Pulse/Heart Rate Source  Monitor 08/30/17 0750   Weight  89.9 kg (198 lb 1.6 oz) 08/25/17 0235    BP  118/54 08/30/17 0750   Weight Method  Bed scale 08/25/17 0235    BP Location  Left arm 08/30/17 0750   BSA (Calculated - sq m)  2.17 08/25/17 0235    OXYGEN THERAPY     BMI (Calculated)  25.49 08/25/17 0235    SpO2  97 % 08/30/17 0750   MITALI COMA SCALE      O2 Device  None (Room air) 08/30/17 0750   Best Eye Response  4-->(E4) spontaneous 08/30/17 0803    FiO2 (%)  96 % 08/27/17 1458   Best Motor Response  6-->(M6) obeys commands 08/30/17 0803    Oxygen Delivery  2 LPM 08/28/17 0331   Best Verbal Response  5-->(V5) oriented 08/30/17 0803    RESPIRATORY MONITORING     Mitali Coma Scale Score  15 08/30/17 0803    Respiratory Monitoring (EtCO2)  33 mmHg 08/28/17 1018   POSITIONING      Integrated Pulmonary Index (IPI)  8-9 08/28/17 1018   Body Position  right, side-lying 08/30/17 0251    PAIN/COMFORT     Head of Bed (HOB)  HOB at 30 degrees 08/30/17 0251    Patient Currently in Pain  yes 08/30/17 0803   Positioning/Transfer Devices  pillows;in use 08/30/17 0251    Preferred Pain Scale  CAPA (Clinically Aligned Pain Assessment) ([facility], [facility] Adults Only) 08/30/17 0803   Chair  Upright in chair 08/30/17 0956    Pain Location  Hip 08/30/17 0803   DAILY CARE      Pain Orientation  Left 08/30/17 0803   Activity Type  ambulated in ayers;ambulated in room;activity adjusted per tolerance 08/30/17 0956    Pain Descriptors  Aching;Constant 08/30/17 0803   Activity Level of Assistance  assistance, 2 people 08/30/17 0956    Pain Management Interventions  analgesia administered 08/30/17 0803   Activity Assistive " Device  walker;gait belt 08/30/17 0251    Pain Intervention(s)  Medication (See eMAR) 08/30/17 0803   Additional Documentation  Activity Device Assistance (Row) 08/28/17 1620    Response to Interventions  Decrease in pain 08/30/17 0940   ECG      CLINICALLY ALIGNED PAIN ASSESSMENT (CAPA) ([facility], [facility] AND [facility] ADULTS ONLY)     ECG Rhythm  Sinus rhythm 08/27/17 1331    Comfort  tolerable with discomfort 08/30/17 0803   Ectopy  None 08/27/17 1331    Change in Pain  about the same 08/30/17 0803   Lead Monitored  Lead II;V 5 08/27/17 1331    Pain Control  partially effective 08/30/17 0803   Rhythm Comment  ST Segment Normal 08/27/17 1331    Functioning  can do most things, but pain gets in the way of some 08/30/17 0803   POINT OF CARE TESTING      Sleep  awake with occasional pain 08/30/17 0803   Puncture Site  fingertip 08/29/17 2204    ANALGESIA SIDE EFFECTS MONITORING     Bedside Glucose (mg/dl )   294 mg/dl 08/29/17 2204    Side Effects Monitoring: Respiratory Quality  R 08/30/17 0803                 Patient Lines/Drains/Airways Status    Active LINES/DRAINS/AIRWAYS     Name: Placement date: Placement time: Site: Days: Last dressing change:    Peripheral IV 08/27/17 Right;Anterior Upper forearm 08/27/17   1235   Upper forearm   2     Wound 08/25/17 Right Leg Ulceration 08/25/17   0258   Leg   5     Incision/Surgical Site 08/27/17 Left Hip 08/27/17   1146    2             Patient Lines/Drains/Airways Status    Active PICC/CVC     None            Intake/Output Detail Report     Date Intake       Output   Net    Shift P.O. I.V. IV Piggyback Colloid Total Urine Blood Total       Day 08/29/17 0000 - 08/29/17 0659 50 -- -- -- 50 250 -- 250 -200    Alva 08/29/17 0700 - 08/29/17 1459 650 -- -- -- 650 625 -- 625 25    Noc 08/29/17 1500 - 08/29/17 2359 -- -- -- -- -- 425 -- 425 -425    Day 08/30/17 0000 - 08/30/17 0659 400 -- -- -- 400 200 -- 200 200    Alva 08/30/17 0700 - 08/30/17 1459 400 -- -- -- 400 150 -- 150  250      Last Void/BM       Most Recent Value    Urine Occurrence 1 at 08/26/2017 1400    Stool Occurrence 1 at 08/26/2017 1400      Case Management/Discharge Planning     Case Management/Discharge Planning Flowsheet     LIVING ENVIRONMENT     [facility] CAREGIVER      Lives With  child(daisha), adult;spouse 08/29/17 1426   Filed Complexity Screen Score  6 08/25/17 0918    Living Arrangements  house 08/29/17 1426   ABUSE RISK SCREEN      Provides Primary Care For  no one 08/25/17 0258   QUESTION TO PATIENT:  Has a member of your family or a partner(now or in the past) intimidated, hurt, manipulated, or controlled you in any way?  -- (Inpatient) 08/27/17 0823    COPING/STRESS     QUESTION TO PATIENT: Do you feel safe going back to the place where you are living?  yes 08/25/17 0258    Major Change/Loss/Stressor  alf 08/25/17 0258   OBSERVATION: Is there reason to believe there has been maltreatment of a vulnerable adult (ie. Physical/Sexual/Emotional abuse, self neglect, lack of adequate food, shelter, medical care, or financial exploitation)?  no 08/25/17 0258    DISCHARGE PLANNING     (R) MENTAL HEALTH SUICIDE RISK      Transportation Available  family or friend will provide 08/29/17 1426   Are you depressed or being treated for depression?  No 08/25/17 0258    FINAL RESOURCES     HOMICIDE RISK      Equipment Currently Used at Home  none 08/29/17 1426   Homicidal Ideation  no 08/25/17 0258

## 2017-08-24 NOTE — LETTER
Transition Communication Hand-off for Care Transitions to Next Level of Care Provider    Name: Amos Walker  MRN #: [id number]  Primary Care Provider: Racheal Swift     Primary Clinic: [address] [place] [address]  [facility] [id number]     Reason for Hospitalization:  Fracture of neck of femur (H)  Left Hip Fracture  Left Femoral Neck Fracture   Admit Date/Time: 8/24/2017  9:38 PM  Discharge Date: 8/30/17  Payor Source: Payor: MEDICARE / Plan: MEDICARE / Product Type: Medicare /     Readmission Assessment Measure (OSWALD) Risk Score/category: Average         Reason for Communication Hand-off Referral: Other D/c to TCU    Discharge Plan:  Social Work Services Discharge Note     Patient Name:  Amos Walker     Anticipated Discharge Date:  8/30/17     Discharge Disposition:   TCU:  [facility]   [address] [place] [address] [place] [address]  P. [phone], F. [phone]     Following MD:  Stefan MUNSON     Pre-Admission Screening (PAS) online form has been completed.  The Level of Care (LOC) is:  Determined  Confirmation Code is:  [id number]  Patient/caregiver informed of referral to [facility] for Pre-Admission Screening for skilled nursing facility (SNF) placement and to expect a phone call post discharge from SNF.     Additional Services/Equipment Arranged:  W/c transport arranged via [employer] ([phone]) for today at 1pm.     Patient / Family response to discharge plan:  Pt is agreeable as long as his wife is. Pt's wife Danielle is agreeable and pt defers to her.     Persons notified of above discharge plan: Pt,  pt's wife Danielle, bedside nurse, charge nurse, NST, receiving facility, Trauma team     Concern for non-adherence with plan of care:   Y/N Unknown  Discharge Needs Assessment:  Needs       Most Recent Value    Equipment Currently Used at Home none    Transportation Available family or friend will provide          Already enrolled in Tele-monitoring program and name of program:   Unknown  Follow-up specialty is recommended: Yes    Follow-up plan:  Future Appointments  Date Time Provider Department Center   8/31/2017 6:00 AM Elin Lee PT [facility]   9/5/2017 10:15 AM Brayan Mcclain DPM [facility]   9/18/2017 10:30 AM Racheal Swift MD [facility]   6/20/2018 10:30 AM Jen Aranda MD [facility]       Any outstanding tests or procedures:        Referrals     Future Labs/Procedures    Occupational Therapy Adult Consult     Comments:    Evaluate and treat as clinically indicated.    Reason:  S/p left femur fracture    Physical Therapy Adult Consult     Comments:    Evaluate and treat as clinically indicated.    Reason:  S/p left femur fracture            VALENTÍN Mckeon, MSW  7B   [phone] (pager) [phone]  8/30/2017

## 2017-08-24 NOTE — IP AVS SNAPSHOT
"    [address] [facility]: [phone]                                              INTERAGENCY TRANSFER FORM - PHYSICIAN ORDERS   2017                    Hospital Admission Date: 2017  RASHEED SORIANO   : 1945  Sex: Male        Attending Provider: Abby Bower MD     Allergies:  Lisinopril, Neomycin, Methylchloroisothiazolinone [Methylisothiazolinone], Povidone Iodine    Infection:  MRSA-Contact Isolation   Service:  TRAUMA    Ht:  1.88 m (6' 2\")   Wt:  89.9 kg (198 lb 1.6 oz)   Admission Wt:  82.1 kg (181 lb)    BMI:  25.43 kg/m 2   BSA:  2.17 m 2            Patient PCP Information     Provider PCP Type    Racheal Swift MD General      ED Clinical Impression     Diagnosis Description Comment Added By Time Added    Closed fracture of neck of right femur, initial encounter (H) [S72.001A] Closed fracture of neck of right femur, initial encounter (H) [S72.001A]  Rik Saleh NP 2017  8:44 AM    Diabetes mellitus with peripheral vascular disease (H) [E11.51] Diabetes mellitus with peripheral vascular disease (H) [E11.51]  Rik Saleh NP 2017  8:45 AM    Type 2 diabetes mellitus with diabetic peripheral angiopathy without gangrene, with long-term current use of insulin (H) [E11.51, Z79.4] Type 2 diabetes mellitus with diabetic peripheral angiopathy without gangrene, with long-term current use of insulin (H) [E11.51, Z79.4]  Rik Saleh NP 2017  8:46 AM    Diabetes mellitus (H) [E11.9] Diabetes mellitus (H) [E11.9]  Rik Saleh NP 2017  8:46 AM    Type 2 diabetes, HbA1c goal < 7% (H) [E11.9] Type 2 diabetes, HbA1c goal < 7% (H) [E11.9]  Rik Saleh NP 2017  8:46 AM    Type 2 diabetes, controlled, with neuropathy (H) [E11.40] Type 2 diabetes, controlled, with neuropathy (H) [E11.40]  Rik Saleh NP 2017  8:46 AM    Diabetic neuropathy with neurologic complication (H) [E11.40, E11.49] Diabetic " neuropathy with neurologic complication (H) [E11.40, E11.49]  Rik Saleh NP 8/30/2017  8:47 AM    Venous stasis [I87.8] Venous stasis [I87.8]  Rik Saleh NP 8/30/2017  8:47 AM    Ulcer of right lower leg, with fat layer exposed (H) [L97.912] Ulcer of right lower leg, with fat layer exposed (H) [L97.912]  Rik Saleh NP 8/30/2017  8:47 AM    Chronic venous hypertension with ulcer involving right side (H) [I87.311] Chronic venous hypertension with ulcer involving right side (H) [I87.311]  Rik Saleh NP 8/30/2017  8:47 AM    Dermatitis [L30.9] Dermatitis [L30.9]  Rik Saleh NP 8/30/2017  8:47 AM    Peripheral vascular disease, unspecified (H) [I73.9] Peripheral vascular disease, unspecified (H) [I73.9]  Rik Saleh NP 8/30/2017  8:47 AM    Constipation, unspecified constipation type [K59.00] Constipation, unspecified constipation type [K59.00]  Rik Saleh NP 8/30/2017  8:48 AM      Hospital Problems as of 8/30/2017              Priority Class Noted POA    Fracture of neck of femur (H) Medium  8/25/2017 Yes      Non-Hospital Problems as of 8/30/2017              Priority Class Noted    Senile nuclear sclerosis Medium  12/11/2014    PVD (peripheral vascular disease) (H) Medium  6/18/2015    HTN (hypertension) Medium  6/26/2015    CKD (chronic kidney disease) stage 3, GFR 30-59 ml/min Medium  12/21/2015    Type 2 diabetes, controlled, with neuropathy (H) Medium  3/21/2016    Diabetes mellitus with peripheral vascular disease (H) Medium  2/21/2017      Code Status History     Date Active Date Inactive Code Status Order ID Comments User Context    This patient has a current code status but no historical code status.         Medication Review      START taking        Dose / Directions Comments    acetaminophen 500 MG tablet   Commonly known as:  TYLENOL        Dose:  1000 mg   Take 2 tablets (1,000 mg) by mouth every 6 hours as needed for mild pain    Refills:  0        calcium carbonate 1250 MG tablet   Commonly known as:  OS-CLAUDIA 500 mg [place]. Ca        Dose:  1250 mg   Take 1 tablet (1,250 mg) by mouth 2 times daily (with meals)   Quantity:  90 tablet   Refills:  0        * insulin aspart 100 UNIT/ML injection   Commonly known as:  NovoLOG PEN   Used for:  Type 2 diabetes, controlled, with neuropathy (H)        Dose:  1-7 Units   Inject 1-7 Units Subcutaneous 3 times daily (before meals) Do Not give Correction Insulin if Pre-Meal BG less than 140.   - 189 give 1 unit.   - 239 give 2 units.   - 289 give 3 units.   - 339 give 4 units.  - 399 give 5 units.  -449 give 6 units BG greater than or equal to 450 give 7 units.   Refills:  0        * insulin aspart 100 UNIT/ML injection   Commonly known as:  NovoLOG PEN   Used for:  Type 2 diabetes, controlled, with neuropathy (H)        Dose:  1-5 Units   Inject 1-5 Units Subcutaneous At Bedtime Do Not give Bedtime Correction Insulin if BG less than  200.  For  - 249 give 1 units.  For  - 299 give 2 units.  For  - 349 give 3 units.  For  -399 give 4 units.  For BG greater than or equal to 400 give 5 units. Notify provider if glucose greater than or equal to 350 mg/dL after administration of correction dose.   Refills:  0        methocarbamol 750 MG tablet   Commonly known as:  ROBAXIN        Dose:  750 mg   Take 1 tablet (750 mg) by mouth 4 times daily   Quantity:  45 tablet   Refills:  0        oxyCODONE 5 MG IR tablet   Commonly known as:  ROXICODONE        Dose:  5-10 mg   Take 1-2 tablets (5-10 mg) by mouth every 3 hours as needed for moderate to severe pain   Refills:  0        polyethylene glycol Packet   Commonly known as:  MIRALAX/GLYCOLAX   Used for:  Constipation, unspecified constipation type        Dose:  17 g   Take 17 g by mouth daily   Quantity:  7 packet   Refills:  0        senna-docusate 8.6-50 MG per tablet   Commonly known as:   SENOKOT-S;PERICOLACE   Used for:  Constipation, unspecified constipation type        Dose:  2 tablet   Take 2 tablets by mouth 2 times daily   Quantity:  100 tablet   Refills:  0        Warfarin Therapy Reminder        Dose:  1 each   1 each continuous prn   Refills:  0        * Notice:  This list has 2 medication(s) that are the same as other medications prescribed for you. Read the directions carefully, and ask your doctor or other care provider to review them with you.      CONTINUE these medications which may have CHANGED, or have new prescriptions. If we are uncertain of the size of tablets/capsules you have at home, strength may be listed as something that might have changed.        Dose / Directions Comments    Cholecalciferol 3000 UNITS Tabs   This may have changed:    - medication strength  - how much to take        Dose:  3000 Units   Take 3,000 Units by mouth daily   Quantity:  30 tablet   Refills:  0          CONTINUE these medications which have NOT CHANGED        Dose / Directions Comments    ammonium lactate 12 % cream   Commonly known as:  LAC-HYDRIN   Used for:  Venous stasis, Type 2 diabetes, controlled, with neuropathy (H)        Apply topically 2 times daily as needed for dry skin   Quantity:  385 g   Refills:  3        ascorbic acid 500 MG Tabs   Used for:  Ulcer of right lower leg, with fat layer exposed (H)        Dose:  500 mg   Take 1 tablet (500 mg) by mouth 2 times daily   Quantity:  30 tablet   Refills:  0        blood glucose monitoring lancets   Used for:  Type 2 diabetes, uncontrolled, with neuropathy (H)        Use to test blood sugars 2 as directed.   Quantity:  3 Box   Refills:  3        blood glucose monitoring test strip   Commonly known as:  ONE TOUCH ULTRA   Used for:  Type 2 diabetes mellitus (H)        Use to test blood sugars 2 times daily or as directed.   Quantity:  60 strip   Refills:  11        clopidogrel 75 MG tablet   Commonly known as:  PLAVIX   Used for:  Peripheral  "vascular disease, unspecified (H)        Dose:  75 mg   Take 1 tablet (75 mg) by mouth daily   Quantity:  30 tablet   Refills:  11        ferrous sulfate 325 (65 FE) MG tablet   Commonly known as:  IRON   Used for:  Peripheral vascular disease, unspecified (H)        Dose:  325 mg   Take 1 tablet (325 mg) by mouth 2 times daily   Quantity:  60 tablet   Refills:  11        gentamicin 0.1 % cream   Commonly known as:  GARAMYCIN   Used for:  Ulcer of right lower leg, with fat layer exposed (H), Chronic venous hypertension with ulcer involving right side (H), Type 2 diabetes, controlled, with neuropathy (H)        Apply topically daily To right leg ulcer.   Quantity:  30 g   Refills:  5        hydrocortisone 0.2 % cream   Commonly known as:  WESTCORT   Used for:  Dermatitis        Apply sparingly to affected area twice times daily for 14 days.   Quantity:  15 g   Refills:  3        insulin glargine 100 UNIT/ML injection   Commonly known as:  LANTUS SOLOSTAR   Used for:  Type 2 diabetes mellitus with diabetic peripheral angiopathy without gangrene, with long-term current use of insulin (H)        Dose:  25 Units   Inject 25 Units Subcutaneous At Bedtime   Quantity:  9 mL   Refills:  5        insulin pen needle 31G X 8 MM   Commonly known as:  B-D U/F   Used for:  Diabetes mellitus, type II (H)        Use 1 daily o as directed   Quantity:  100 each   Refills:  3        nateglinide 120 MG tablet   Commonly known as:  STARLIX   Used for:  Type 2 diabetes, controlled, with neuropathy (H)        TAKE 1 TABLET BY MOUTH THREE TIMES DAILY BEFORE MEALS   Quantity:  90 tablet   Refills:  11        OPTIFOAM 6\"X6\" Pads   Used for:  Ulcer of right leg, with fat layer exposed (H)        Dose:  1 Box   1 Box once a week   Quantity:  1 each   Refills:  6        * order for DME   Used for:  Varicose veins of lower extremities with other complications, Venous stasis ulcer of right lower extremity (H)        Please measure and distribute 1 " pair of 20mm Hg - 30mm Hg knee high ULCER CARE open or closed toe compression stockings.  Patient has a size 13 foot and please take this into consideration.  [place]st or equivalent   Quantity:  2 each   Refills:  1        * order for DME   Used for:  Varicose veins of both lower extremities with complications        Please measure and distribute 1 pair of 20mmHg - 30mmHg knee high open or closed toe compression stockings. Jobst ultrasheer or equivalent.   Quantity:  3 each   Refills:  12        * order for DME   Used for:  Varicose veins of bilateral lower extremities with other complications        Please measure and distribute 1 pair of 30mmHg - 40mmHg knee high open toe ulcercare compression stockings. Jobst ultrasheer or equivalent.   Quantity:  2 each   Refills:  6        sildenafil 50 MG cap/tab   Commonly known as:  REVATIO/VIAGRA   Used for:  Vasculogenic erectile dysfunction, unspecified vasculogenic erectile dysfunction type        Dose:  50 mg   Take 1 tablet (50 mg) by mouth daily as needed for erectile dysfunction   Quantity:  10 tablet   Refills:  11        silver sulfADIAZINE 1 % cream   Commonly known as:  SILVADENE   Used for:  Ulcer of right lower leg, with fat layer exposed (H), Chronic venous hypertension with ulcer involving right side (H), Type 2 diabetes, controlled, with neuropathy (H)        Apply topically daily To affected areas on right foot and leg.   Quantity:  85 g   Refills:  5        simvastatin 10 MG tablet   Commonly known as:  ZOCOR   Used for:  Type 2 diabetes, controlled, with neuropathy (H)        Dose:  10 mg   Take 1 tablet (10 mg) by mouth At Bedtime   Quantity:  90 tablet   Refills:  3        sitagliptin 100 MG tablet   Commonly known as:  JANUVIA   Used for:  Type 2 diabetes, controlled, with neuropathy (H)        Dose:  100 mg   Take 1 tablet (100 mg) by mouth daily   Quantity:  90 tablet   Refills:  3        * Notice:  This list has 3 medication(s) that are the same as  other medications prescribed for you. Read the directions carefully, and ask your doctor or other care provider to review them with you.      STOP taking     aspirin 81 MG chewable tablet           HYDROCHLOROTHIAZIDE PO           LISINOPRIL PO                   Summary of Visit     Reason for your hospital stay       Trauma mechanism:Fall                   Time/date of injury: 8/25/2017, around 6pm                 Known Injuries:  1. Left femoral neck fracture  2. Left elbow bruising  Other diagnosis   1. Acute pain  2. Hypertension  3. Hyperlipidemia   4. Acute blood loss anemia   5. PAD s/p right leg stenting--on Plavix and ASA   6. Chronic lower extremity ulcers with MRSA  7. Diabetes Mellitus   8. Chronic kidney disease, stage 3  9. Tobacco dependence             After Care     Activity - Up with nursing assistance           Advance Diet as Tolerated       Follow this diet upon discharge:     Regular Diet Adult       Fall precautions           General info for SNF       Length of Stay Estimate: Short Term Care: Estimated # of Days <30  Condition at Discharge: Improving  Level of care:skilled   Rehabilitation Potential: Excellent  Admission H&P remains valid and up-to-date: Yes  Recent Chemotherapy: N/A  Use Nursing Home Standing Orders: Yes       Glucose monitor nursing POCT       Before meals and at bedtime       Intake and output       Every shift       Mantoux instructions       Give two-step Mantoux (PPD) Per Facility Policy Yes       Wound care       Site:   Left hip  Instructions:  Remove aquacel dressing in 7 days. Keep incision CDI       Wound care (specify)       Right leg: (shin and foot wounds)  1. Spray with microklenz spray   2. Apply gentamicin to shin wound bed  3. Apply Silvadene around wound edges and to foot wound and any cracked skin   4. Cover with Primapore dressings             Referrals     Occupational Therapy Adult Consult       Evaluate and treat as clinically indicated.    Reason:   S/p left femur fracture       Physical Therapy Adult Consult       Evaluate and treat as clinically indicated.    Reason:  S/p left femur fracture             Other Orders     Contact Isolation                 Your next 10 appointments already scheduled     Sep 05, 2017 10:15 AM CDT   [facility]an [facility]   [facility]    [address] [address] [place] [address]   [phone]            Sep 18, 2017 10:30 AM CDT   (Arrive by 10:15 AM)   Return Visit with Racheal Swift MD   [facility] ([facility])    [address]  4th Floor  [facility] [address]   [phone]            Jun 20, 2018 10:30 AM CDT   RETURN RETINA with Jen Aranda MD   [facility] ([facility])    [facility]  [address]  [address]  [place] [address]   [phone]              Follow-Up Appointment Instructions     Future Labs/Procedures    Follow Up and recommended labs and tests     Comments:    Follow up with your primary care provider for continued medical care and hospital follow up in 5-10 days.     Orthopaedic Clinic- follow up in 2 weeks with Dr. Jackman   [facility]  Fl[address]   [address] [place] [id number]   Appointments: [phone]      Follow-Up Appointment Instructions     Follow Up and recommended labs and tests       Follow up with your primary care provider for continued medical care and hospital follow up in 5-10 days.     Orthopaedic Clinic- follow up in 2 weeks with Dr. Jackman   [facility]  [address]   [address] [place] [address]   Appointments: [phone]             Statement of Approval     Ordered          08/30/17 0856  I have reviewed and agree with all the recommendations and orders detailed in this document.  EFFECTIVE NOW     Approved and electronically signed by:   Delfino, Rik Clement, TAMIKA

## 2017-08-24 NOTE — TELEPHONE ENCOUNTER
[facility] CPT code: , 25876, 23358 ICD10 code: L97.912, E11.10, E11.49. There is no PA needed, in- network benefits are a $300 deductible that has been met, a 20% co-insurance $1,750 out of pocket max, which $3,96.00 has been met so far. Once the out of pocket is met patient is covered at 100%. Call Ref. # [id number] 08/23/17    Verified for course of treatment for up to 5 applications for the life of the wound.

## 2017-08-24 NOTE — IP AVS SNAPSHOT
MRN:[id number]                      After Visit Summary   8/24/2017    Amos Walker    MRN: [id number]           Thank you!     Thank you for choosing [facility] for your care. Our goal is always to provide you with excellent care. Hearing back from our patients is one way we can continue to improve our services. Please take a few minutes to complete the written survey that you may receive in the mail after you visit with us. Thank you!        Patient Information     Date Of Birth          1945        Designated Caregiver       Most Recent Value    Caregiver    Will someone help with your care after discharge? yes    Name of designated caregiver Danielle Walker (wife)    Phone number of caregiver [phone]    Caregiver address [address], [place] [address]      About your hospital stay     You were admitted on:  August 25, 2017 You last received care in the:  [facility]    You were discharged on:  August 30, 2017        Reason for your hospital stay       Trauma mechanism:Fall                   Time/date of injury: 8/25/2017, around 6pm                 Known Injuries:  1. Left femoral neck fracture  2. Left elbow bruising  Other diagnosis   1. Acute pain  2. Hypertension  3. Hyperlipidemia   4. Acute blood loss anemia   5. PAD s/p right leg stenting--on Plavix and ASA   6. Chronic lower extremity ulcers with MRSA  7. Diabetes Mellitus   8. Chronic kidney disease, stage 3  9. Tobacco dependence                  Who to Call     For medical emergencies, please call 911.  For non-urgent questions about your medical care, please call your primary care provider or clinic, [phone]  For questions related to your surgery, please call your surgery clinic        Attending Provider     Provider Specialty    Brayan Limon MD Emergency Medicine    Abby Bower MD Surgery       Primary Care Provider Office Phone # Fax #    Racheal wSift MD  [phone]      After Care Instructions     Activity - Up with nursing assistance           Advance Diet as Tolerated       Follow this diet upon discharge:     Regular Diet Adult            Fall precautions           General info for SNF       Length of Stay Estimate: Short Term Care: Estimated # of Days <30  Condition at Discharge: Improving  Level of care:skilled   Rehabilitation Potential: Excellent  Admission H&P remains valid and up-to-date: Yes  Recent Chemotherapy: N/A  Use Nursing Home Standing Orders: Yes            Glucose monitor nursing POCT       Before meals and at bedtime            Intake and output       Every shift            Mantoux instructions       Give two-step Mantoux (PPD) Per Facility Policy Yes            Wound care       Site:   Left hip  Instructions:  Remove aquacel dressing in 7 days. Keep incision CDI            Wound care (specify)       Right leg: (shin and foot wounds)  1. Spray with microklenz spray   2. Apply gentamicin to shin wound bed  3. Apply Silvadene around wound edges and to foot wound and any cracked skin   4. Cover with Primapore dressings                  Follow-up Appointments     Follow Up and recommended labs and tests       Follow up with your primary care provider for continued medical care and hospital follow up in 5-10 days.     Orthopaedic Clinic- follow up in 2 weeks with Dr. Jackman   [facility]  Floor [address]   [address] [place] [address]   Appointments: [phone]                  Your next 10 appointments already scheduled     Sep 05, 2017 10:15 AM CDT   [facility] Brayan Mcclain DPM   [facility]    [id number] [place] [address] [phone]            Sep 18, 2017 10:30 AM CDT   (Arrive by 10:15 AM)   Return Visit with Racheal Swift MD   [facility] ([facility])    [address] [place] [address]  Se  4th Floor  [facility] [address]   [phone]            Jun 20, 2018 10:30 AM CDT   RETURN RETINA with Jen Aranda MD   [facility] ([facility])    [address]  [address]  [address]th [address] Clin [address]  [facility] [address]   [phone]              Additional Services     Occupational Therapy Adult Consult       Evaluate and treat as clinically indicated.    Reason:  S/p left femur fracture            Physical Therapy Adult Consult       Evaluate and treat as clinically indicated.    Reason:  S/p left femur fracture                  Future tests that were ordered for you     Contact Isolation                 Warfarin Instruction     You have started taking a medicine called warfarin. This is a blood-thinning medicine (anticoagulant). It helps prevent and treat blood clots.      Before leaving the hospital, make sure you know how much to take and how long to take it.      You will need regular blood tests to make sure your blood is clotting safely. It is very important to see your doctor for regular blood tests.    Talk to your doctor before taking any new medicine (this includes over-the-counter drugs and herbal products). Many medicines can interact with warfarin. This may cause more bleeding or too much clotting.     Eating a lot of vitamin K--found in green, leafy vegetables--can change the way warfarin works in your body. Do NOT avoid these foods. Instead, try to eat the same amount each day.     Bleeding is the most common side-effect of warfarin. You may notice bleeding gums, a bloody nose, bruises and bleeding longer when you cut yourself. See a doctor at once if:   o You cough up blood  o You find blood in your stool (poop)  o You have a deep cut, or a cut that bleeds longer than 10 minutes   o You have a bad cut, hard fall, accident or hit your head (go to urgent care or the emergency room).    For women who can get pregnant: This medicine can harm an unborn  "baby. Be very careful not to get pregnant while taking this medicine. If you think you might be pregnant, call your doctor right away.    For more information, read \"Guide to Warfarin Therapy,  the booklet you received in the hospital.        Pending Results     Date and Time Order Name Status Description    8/30/2017 0807 Hemoglobin In process             Statement of Approval     Ordered          08/30/17 0856  I have reviewed and agree with all the recommendations and orders detailed in this document.  EFFECTIVE NOW     Approved and electronically signed by:  Rik Saleh NP             Admission Information     Date & Time Provider Department Dept. Phone    8/24/2017 Abby Bower MD [address] [facility] [phone]      Your Vitals Were     Blood Pressure Pulse Temperature Respirations Height Weight    118/54 (BP Location: Left arm) 92 97.5  F (36.4  C) (Oral) 16 1.88 m (6' 2\") 89.9 kg (198 lb 1.6 oz)    Pulse Oximetry BMI (Body Mass Index)                97% 25.43 kg/m2          [email or web address] Information     [email or web address] gives you secure access to your electronic health record. If you see a primary care provider, you can also send messages to your care team and make appointments. If you have questions, please call your primary care clinic.  If you do not have a primary care provider, please call [phone] and they will assist you.        Care EveryWhere ID     This is your Care EveryWhere ID. This could be used by other organizations to access your [facility] medical records  [id number]        Equal Access to Services     SAMSON MELTON : Hadii niurka ferrell Soger, waaxda luqadaha, qaybta kaalmada yolanda jeffery. So [facility] [phone].    ATENCIÓN: Si habla español, tiene a rodrigues disposición servicios gratuitos de asistencia lingüística. Llame al [phone].    We comply with applicable federal civil rights laws and [place] laws. We do not discriminate " on the basis of race, color, national origin, age, disability sex, sexual orientation or gender identity.               Review of your medicines      START taking        Dose / Directions    acetaminophen 500 MG tablet   Commonly known as:  TYLENOL        Dose:  1000 mg   Take 2 tablets (1,000 mg) by mouth every 6 hours as needed for mild pain   Refills:  0       calcium carbonate 1250 MG tablet   Commonly known as:  OS-CLAUDIA 500 mg [place]. Ca        Dose:  1250 mg   Take 1 tablet (1,250 mg) by mouth 2 times daily (with meals)   Quantity:  90 tablet   Refills:  0       * insulin aspart 100 UNIT/ML injection   Commonly known as:  NovoLOG PEN   Used for:  Type 2 diabetes, controlled, with neuropathy (H)        Dose:  1-7 Units   Inject 1-7 Units Subcutaneous 3 times daily (before meals) Do Not give Correction Insulin if Pre-Meal BG less than 140.   - 189 give 1 unit.   - 239 give 2 units.   - 289 give 3 units.   - 339 give 4 units.  - 399 give 5 units.  -449 give 6 units BG greater than or equal to 450 give 7 units.   Refills:  0       * insulin aspart 100 UNIT/ML injection   Commonly known as:  NovoLOG PEN   Used for:  Type 2 diabetes, controlled, with neuropathy (H)        Dose:  1-5 Units   Inject 1-5 Units Subcutaneous At Bedtime Do Not give Bedtime Correction Insulin if BG less than  200.  For  - 249 give 1 units.  For  - 299 give 2 units.  For  - 349 give 3 units.  For  -399 give 4 units.  For BG greater than or equal to 400 give 5 units. Notify provider if glucose greater than or equal to 350 mg/dL after administration of correction dose.   Refills:  0       methocarbamol 750 MG tablet   Commonly known as:  ROBAXIN        Dose:  750 mg   Take 1 tablet (750 mg) by mouth 4 times daily   Quantity:  45 tablet   Refills:  0       oxyCODONE 5 MG IR tablet   Commonly known as:  ROXICODONE        Dose:  5-10 mg   Take 1-2 tablets (5-10 mg) by mouth every 3 hours  as needed for moderate to severe pain   Refills:  0       polyethylene glycol Packet   Commonly known as:  MIRALAX/GLYCOLAX   Used for:  Constipation, unspecified constipation type        Dose:  17 g   Take 17 g by mouth daily   Quantity:  7 packet   Refills:  0       senna-docusate 8.6-50 MG per tablet   Commonly known as:  SENOKOT-S;PERICOLACE   Used for:  Constipation, unspecified constipation type        Dose:  2 tablet   Take 2 tablets by mouth 2 times daily   Quantity:  100 tablet   Refills:  0       Warfarin Therapy Reminder        Dose:  1 each   1 each continuous prn   Refills:  0       * Notice:  This list has 2 medication(s) that are the same as other medications prescribed for you. Read the directions carefully, and ask your doctor or other care provider to review them with you.      CONTINUE these medicines which may have CHANGED, or have new prescriptions. If we are uncertain of the size of tablets/capsules you have at home, strength may be listed as something that might have changed.        Dose / Directions    Cholecalciferol 3000 UNITS Tabs   This may have changed:    - medication strength  - how much to take        Dose:  3000 Units   Take 3,000 Units by mouth daily   Quantity:  30 tablet   Refills:  0         CONTINUE these medicines which have NOT CHANGED        Dose / Directions    ammonium lactate 12 % cream   Commonly known as:  LAC-HYDRIN   Used for:  Venous stasis, Type 2 diabetes, controlled, with neuropathy (H)        Apply topically 2 times daily as needed for dry skin   Quantity:  385 g   Refills:  3       ascorbic acid 500 MG Tabs   Used for:  Ulcer of right lower leg, with fat layer exposed (H)        Dose:  500 mg   Take 1 tablet (500 mg) by mouth 2 times daily   Quantity:  30 tablet   Refills:  0       blood glucose monitoring lancets   Used for:  Type 2 diabetes, uncontrolled, with neuropathy (H)        Use to test blood sugars 2 as directed.   Quantity:  3 Box   Refills:  3        "blood glucose monitoring test strip   Commonly known as:  ONE TOUCH ULTRA   Used for:  Type 2 diabetes mellitus (H)        Use to test blood sugars 2 times daily or as directed.   Quantity:  60 strip   Refills:  11       clopidogrel 75 MG tablet   Commonly known as:  PLAVIX   Used for:  Peripheral vascular disease, unspecified (H)        Dose:  75 mg   Take 1 tablet (75 mg) by mouth daily   Quantity:  30 tablet   Refills:  11       ferrous sulfate 325 (65 FE) MG tablet   Commonly known as:  IRON   Used for:  Peripheral vascular disease, unspecified (H)        Dose:  325 mg   Take 1 tablet (325 mg) by mouth 2 times daily   Quantity:  60 tablet   Refills:  11       gentamicin 0.1 % cream   Commonly known as:  GARAMYCIN   Used for:  Ulcer of right lower leg, with fat layer exposed (H), Chronic venous hypertension with ulcer involving right side (H), Type 2 diabetes, controlled, with neuropathy (H)        Apply topically daily To right leg ulcer.   Quantity:  30 g   Refills:  5       hydrocortisone 0.2 % cream   Commonly known as:  WESTCORT   Used for:  Dermatitis        Apply sparingly to affected area twice times daily for 14 days.   Quantity:  15 g   Refills:  3       insulin glargine 100 UNIT/ML injection   Commonly known as:  LANTUS SOLOSTAR   Used for:  Type 2 diabetes mellitus with diabetic peripheral angiopathy without gangrene, with long-term current use of insulin (H)        Dose:  25 Units   Inject 25 Units Subcutaneous At Bedtime   Quantity:  9 mL   Refills:  5       insulin pen needle 31G X 8 MM   Commonly known as:  B-D U/F   Used for:  Diabetes mellitus, type II (H)        Use 1 daily o as directed   Quantity:  100 each   Refills:  3       nateglinide 120 MG tablet   Commonly known as:  STARLIX   Used for:  Type 2 diabetes, controlled, with neuropathy (H)        TAKE 1 TABLET BY MOUTH THREE TIMES DAILY BEFORE MEALS   Quantity:  90 tablet   Refills:  11       OPTIFOAM 6\"X6\" Pads   Used for:  Ulcer of right " leg, with fat layer exposed (H)        Dose:  1 Box   1 Box once a week   Quantity:  1 each   Refills:  6       * order for DME   Used for:  Varicose veins of lower extremities with other complications, Venous stasis ulcer of right lower extremity (H)        Please measure and distribute 1 pair of 20mm Hg - 30mm Hg knee high ULCER CARE open or closed toe compression stockings.  Patient has a size 13 foot and please take this into consideration.  Jobst or equivalent   Quantity:  2 each   Refills:  1       * order for DME   Used for:  Varicose veins of both lower extremities with complications        Please measure and distribute 1 pair of 20mmHg - 30mmHg knee high open or closed toe compression stockings. Jobst ultrasheer or equivalent.   Quantity:  3 each   Refills:  12       * order for DME   Used for:  Varicose veins of bilateral lower extremities with other complications        Please measure and distribute 1 pair of 30mmHg - 40mmHg knee high open toe ulcercare compression stockings. Jobst ultrasheer or equivalent.   Quantity:  2 each   Refills:  6       sildenafil 50 MG cap/tab   Commonly known as:  REVATIO/VIAGRA   Used for:  Vasculogenic erectile dysfunction, unspecified vasculogenic erectile dysfunction type        Dose:  50 mg   Take 1 tablet (50 mg) by mouth daily as needed for erectile dysfunction   Quantity:  10 tablet   Refills:  11       silver sulfADIAZINE 1 % cream   Commonly known as:  SILVADENE   Used for:  Ulcer of right lower leg, with fat layer exposed (H), Chronic venous hypertension with ulcer involving right side (H), Type 2 diabetes, controlled, with neuropathy (H)        Apply topically daily To affected areas on right foot and leg.   Quantity:  85 g   Refills:  5       simvastatin 10 MG tablet   Commonly known as:  ZOCOR   Used for:  Type 2 diabetes, controlled, with neuropathy (H)        Dose:  10 mg   Take 1 tablet (10 mg) by mouth At Bedtime   Quantity:  90 tablet   Refills:  3        sitagliptin 100 MG tablet   Commonly known as:  RAJEEVUVIA   Used for:  Type 2 diabetes, controlled, with neuropathy (H)        Dose:  100 mg   Take 1 tablet (100 mg) by mouth daily   Quantity:  90 tablet   Refills:  3       * Notice:  This list has 3 medication(s) that are the same as other medications prescribed for you. Read the directions carefully, and ask your doctor or other care provider to review them with you.      STOP taking     aspirin 81 MG chewable tablet           HYDROCHLOROTHIAZIDE PO           LISINOPRIL PO                Where to get your medicines      Some of these will need a paper prescription and others can be bought over the counter. Ask your nurse if you have questions.     You don't need a prescription for these medications     acetaminophen 500 MG tablet    ammonium lactate 12 % cream    ascorbic acid 500 MG Tabs    calcium carbonate 1250 MG tablet    Cholecalciferol 3000 UNITS Tabs    clopidogrel 75 MG tablet    ferrous sulfate 325 (65 FE) MG tablet    gentamicin 0.1 % cream    hydrocortisone 0.2 % cream    insulin aspart 100 UNIT/ML injection    insulin aspart 100 UNIT/ML injection    insulin glargine 100 UNIT/ML injection    methocarbamol 750 MG tablet    nateglinide 120 MG tablet    polyethylene glycol Packet    senna-docusate 8.6-50 MG per tablet    silver sulfADIAZINE 1 % cream    simvastatin 10 MG tablet    sitagliptin 100 MG tablet    Warfarin Therapy Reminder         Information about where to get these medications is not yet available     ! Ask your nurse or doctor about these medications     oxyCODONE 5 MG IR tablet                Protect others around you: Learn how to safely use, store and throw away your medicines at www.disposemymeds.org.             Medication List: This is a list of all your medications and when to take them. Check marks below indicate your daily home schedule. Keep this list as a reference.      Medications           Morning Afternoon Evening Bedtime As  Needed    acetaminophen 500 MG tablet   Commonly known as:  TYLENOL   Take 2 tablets (1,000 mg) by mouth every 6 hours as needed for mild pain   Last time this was given:  1,000 mg on 8/30/2017  7:48 AM                                ammonium lactate 12 % cream   Commonly known as:  LAC-HYDRIN   Apply topically 2 times daily as needed for dry skin                                ascorbic acid 500 MG Tabs   Take 1 tablet (500 mg) by mouth 2 times daily   Last time this was given:  1,000 mg on 8/30/2017  7:48 AM                                blood glucose monitoring lancets   Use to test blood sugars 2 as directed.                                blood glucose monitoring test strip   Commonly known as:  ONE TOUCH ULTRA   Use to test blood sugars 2 times daily or as directed.                                calcium carbonate 1250 MG tablet   Commonly known as:  OS-CLAUDIA 500 mg [place]. Ca   Take 1 tablet (1,250 mg) by mouth 2 times daily (with meals)   Last time this was given:  1,250 mg on 8/30/2017  7:48 AM                                Cholecalciferol 3000 UNITS Tabs   Take 3,000 Units by mouth daily   Last time this was given:  3,000 Units on 8/30/2017  7:48 AM                                clopidogrel 75 MG tablet   Commonly known as:  PLAVIX   Take 1 tablet (75 mg) by mouth daily   Last time this was given:  75 mg on 8/30/2017  7:48 AM                                ferrous sulfate 325 (65 FE) MG tablet   Commonly known as:  IRON   Take 1 tablet (325 mg) by mouth 2 times daily   Last time this was given:  325 mg on 8/30/2017  7:48 AM                                gentamicin 0.1 % cream   Commonly known as:  GARAMYCIN   Apply topically daily To right leg ulcer.   Last time this was given:  8/30/2017  7:47 AM                                hydrocortisone 0.2 % cream   Commonly known as:  WESTCORT   Apply sparingly to affected area twice times daily for 14 days.                                * insulin aspart 100  "UNIT/ML injection   Commonly known as:  NovoLOG PEN   Inject 1-7 Units Subcutaneous 3 times daily (before meals) Do Not give Correction Insulin if Pre-Meal BG less than 140.   - 189 give 1 unit.   - 239 give 2 units.   - 289 give 3 units.   - 339 give 4 units.  - 399 give 5 units.  -449 give 6 units BG greater than or equal to 450 give 7 units.   Last time this was given:  1 Units on 8/30/2017  7:48 AM                                * insulin aspart 100 UNIT/ML injection   Commonly known as:  NovoLOG PEN   Inject 1-5 Units Subcutaneous At Bedtime Do Not give Bedtime Correction Insulin if BG less than  200.  For  - 249 give 1 units.  For  - 299 give 2 units.  For  - 349 give 3 units.  For  -399 give 4 units.  For BG greater than or equal to 400 give 5 units. Notify provider if glucose greater than or equal to 350 mg/dL after administration of correction dose.   Last time this was given:  1 Units on 8/30/2017  7:48 AM                                insulin glargine 100 UNIT/ML injection   Commonly known as:  LANTUS SOLOSTAR   Inject 25 Units Subcutaneous At Bedtime   Last time this was given:  25 Units on 8/29/2017 10:22 PM                                insulin pen needle 31G X 8 MM   Commonly known as:  B-D U/F   Use 1 daily o as directed                                methocarbamol 750 MG tablet   Commonly known as:  ROBAXIN   Take 1 tablet (750 mg) by mouth 4 times daily   Last time this was given:  750 mg on 8/30/2017  7:48 AM                                nateglinide 120 MG tablet   Commonly known as:  STARLIX   TAKE 1 TABLET BY MOUTH THREE TIMES DAILY BEFORE MEALS                                OPTIFOAM 6\"X6\" Pads   1 Box once a week                                * order for DME   Please measure and distribute 1 pair of 20mm Hg - 30mm Hg knee high ULCER CARE open or closed toe compression stockings.  Patient has a size 13 foot and please take this " into consideration.  Jobst or equivalent                                * order for DME   Please measure and distribute 1 pair of 20mmHg - 30mmHg knee high open or closed toe compression stockings. Jobst ultrasheer or equivalent.                                * order for DME   Please measure and distribute 1 pair of 30mmHg - 40mmHg knee high open toe ulcercare compression stockings. Jobst ultrasheer or equivalent.                                oxyCODONE 5 MG IR tablet   Commonly known as:  ROXICODONE   Take 1-2 tablets (5-10 mg) by mouth every 3 hours as needed for moderate to severe pain   Last time this was given:  10 mg on 8/30/2017  7:47 AM                                polyethylene glycol Packet   Commonly known as:  MIRALAX/GLYCOLAX   Take 17 g by mouth daily   Last time this was given:  17 g on 8/30/2017  7:48 AM                                senna-docusate 8.6-50 MG per tablet   Commonly known as:  SENOKOT-S;PERICOLACE   Take 2 tablets by mouth 2 times daily   Last time this was given:  2 tablets on 8/30/2017  7:48 AM                                sildenafil 50 MG cap/tab   Commonly known as:  REVATIO/VIAGRA   Take 1 tablet (50 mg) by mouth daily as needed for erectile dysfunction                                silver sulfADIAZINE 1 % cream   Commonly known as:  SILVADENE   Apply topically daily To affected areas on right foot and leg.   Last time this was given:  8/30/2017  7:47 AM                                simvastatin 10 MG tablet   Commonly known as:  ZOCOR   Take 1 tablet (10 mg) by mouth At Bedtime   Last time this was given:  10 mg on 8/29/2017 10:24 PM                                sitagliptin 100 MG tablet   Commonly known as:  JANUVIA   Take 1 tablet (100 mg) by mouth daily                                Warfarin Therapy Reminder   1 each continuous prn                                * Notice:  This list has 5 medication(s) that are the same as other medications prescribed for you. Read  the directions carefully, and ask your doctor or other care provider to review them with you.

## 2017-08-24 NOTE — IP AVS SNAPSHOT
[address] [address]    Phone:  [phone]                                       After Visit Summary   8/24/2017    Amos Walker    MRN: [id number]           After Visit Summary Signature Page     I have received my discharge instructions, and my questions have been answered. I have discussed any challenges I see with this plan with the nurse or doctor.    ..........................................................................................................................................  Patient/Patient Representative Signature      ..........................................................................................................................................  Patient Representative Print Name and Relationship to Patient    ..................................................               ................................................  Date                                            Time    ..........................................................................................................................................  Reviewed by Signature/Title    ...................................................              ..............................................  Date                                                            Time

## 2017-08-24 NOTE — IP AVS SNAPSHOT
` `     [address] [facility] BANK: [phone]            Medication Administration Report for Amos Walker as of 08/30/17 1254   Legend:    Given Hold Not Given Due Canceled Entry Other Actions    Time Time (Time) Time  Time-Action       Inactive    Active    Linked        Medications 08/24/17 08/25/17 08/26/17 08/27/17 08/28/17 08/29/17 08/30/17    acetaminophen (TYLENOL) tablet 1,000 mg  Dose: 1,000 mg Freq: 3 TIMES DAILY Route: PO  Start: 08/25/17 1400   Admin Instructions: Maximum acetaminophen dose from all sources = 75 mg/kg/day not to exceed 4 gram      1151 (1,000 mg)-Given              1950 (1,000 mg)-Given        0836 (1,000 mg)-Given       1347 (1,000 mg)-Given       1930 (1,000 mg)-Given        0807-Auto Hold       1400-Automatically Held       1410-Unhold       (1426)-Not Given       1528 (1,000 mg)-Given       1941 (1,000 mg)-Given        0843 (1,000 mg)-Given       (1455)-Not Given       1621 (1,000 mg)-Given        0031 (1,000 mg)-Given       0905 (1,000 mg)-Given       1412 (1,000 mg)-Given        0008 (1,000 mg)-Given       0748 (1,000 mg)-Given       [ ] 1400           ascorbic acid (VITAMIN C) tablet 1,000 mg  Dose: 1,000 mg Freq: DAILY Route: PO  Start: 08/25/17 0845     0903 (1,000 mg)-Given        0836 (1,000 mg)-Given        0807-Auto Hold       1410-Unhold       (1426)-Not Given        0843 (1,000 mg)-Given        0905 (1,000 mg)-Given        0748 (1,000 mg)-Given           bisacodyl (DULCOLAX) Suppository 10 mg  Dose: 10 mg Freq: DAILY PRN Route: RE  PRN Reason: constipation  Start: 08/25/17 1100       0807-Auto Hold       1410-Unhold          1005 (10 mg)-Given           calcium carbonate (OS-CLAUDIA 500 mg [place]. Ca) tablet 1,250 mg  Dose: 1,250 mg Freq: 2 TIMES DAILY WITH MEALS Route: PO  Start: 08/28/17 1130   Admin Instructions: OS-CLAUDIA 500 = 1250 mg calcium carbonate         1228 (1,250 mg)-Given       1937 (1,250 mg)-Given        0905 (1,250 mg)-Given       1802 (1,250 mg)-Given         0748 (1,250 mg)-Given       [ ] 1800           cholecalciferol (vitamin D) tablet 3,000 Units  Dose: 3,000 Units Freq: DAILY Route: PO  Start: 08/28/17 0945        1015 (3,000 Units)-Given        0905 (3,000 Units)-Given        0748 (3,000 Units)-Given           clopidogrel (PLAVIX) tablet 75 mg  Dose: 75 mg Freq: DAILY Route: PO  Start: 08/28/17 0900        1016 (75 mg)-Given        0904 (75 mg)-Given        0748 (75 mg)-Given           enoxaparin (LOVENOX) injection 40 mg  Dose: 40 mg Freq: EVERY 24 HOURS Route: SC  Start: 08/28/17 0900        1015 (40 mg)-Given        0903 (40 mg)-Given        0924 (40 mg)-Given           ferrous sulfate (IRON) tablet 325 mg  Dose: 325 mg Freq: 2 TIMES DAILY Route: PO  Start: 08/25/17 0845   Admin Instructions: Absorbed best on an empty stomach. If stomach upset occurs, can take with meals.      0903 (325 mg)-Given       1950 (325 mg)-Given        0836 (325 mg)-Given       1930 (325 mg)-Given        0807-Auto Hold       1410-Unhold       (1426)-Not Given       1941 (325 mg)-Given        0843 (325 mg)-Given       1936 (325 mg)-Given        0911 (325 mg)-Given       2012 (325 mg)-Given        0748 (325 mg)-Given       [ ] 2000           gentamicin (GARAMYCIN) 0.1 % cream  Freq: DAILY Route: Top  Start: 08/25/17 1015   Admin Instructions: Apply to right leg ulcer      1154 ( )-Given        0905 ( )-Given        0807-Auto Hold       1410-Unhold       1740 ( )-Given        0847 ( )-Given        0903 ( )-Given        0747 ( )-Given           glucose 40 % gel 15-30 g  Dose: 15-30 g Freq: EVERY 15 MIN PRN Route: PO  PRN Reason: low blood sugar  Start: 08/25/17 0827   Admin Instructions: Give 15 g for BG 51 to 69 mg/dL IF patient is conscious and able to swallow. Give 30 g for BG less than or equal to 50 mg/dL IF patient is conscious and able to swallow. Do NOT give glucose gel via enteral tube.  IF patient has enteral tube: give apple juice 120 mL (4 oz or 15 g of CHO) via enteral tube  for BG 51 to 69 mg/dL.  Give apple juice 240 mL (8 oz or 30 g of CHO) via enteral tube for BG less than or equal to 50 mg/dL.    ~Oral gel is preferable for conscious and able to swallow patient.   ~IF gel unavailable or patient refuses may provide apple juice 120 mL (4 oz or 15 g of CHO). Document juice on I and O flowsheet.               0807-Auto Hold       1410-Unhold             Or  dextrose 50 % injection 25-50 mL  Dose: 25-50 mL Freq: EVERY 15 MIN PRN Route: IV  PRN Reason: low blood sugar  Start: 08/25/17 0827   Admin Instructions: Use if have IV access, BG less than 70 mg/dL and meet dose criteria below:  Dose if conscious and alert (or disorientated) and NPO = 25 mL  Dose if unconscious / not alert = 50 mL  Vesicant.      0827 (25 mL)-Given [C]         0807-Auto Hold       1410-Unhold             Or  glucagon injection 1 mg  Dose: 1 mg Freq: EVERY 15 MIN PRN Route: SC  PRN Reason: low blood sugar  PRN Comment: May repeat x 1 only  Start: 08/25/17 0827   Admin Instructions: May give SQ or IM. ONLY use glucagon IF patient has NO IV access AND is UNABLE to swallow AND blood glucose is LESS than or EQUAL to 50 mg/dL.               0807-Auto Hold       1410-Unhold              HYDROmorphone (PF) (DILAUDID) injection 0.3-0.5 mg  Dose: 0.3-0.5 mg Freq: EVERY 2 HOURS PRN Route: IV  PRN Reason: moderate to severe pain  Start: 08/25/17 0201     0244 (0.3 mg)-Given       0613 (0.3 mg)-Given       0816 (0.3 mg)-Given       1011 (0.3 mg)-Given       1200 (0.3 mg)-Given       2003 (0.3 mg)-Given        0831 (0.3 mg)-Given        0807-Auto Hold       1410-Unhold       1425 (0.3 mg)-Given              insulin aspart (NovoLOG) inj (RAPID ACTING)  Dose: 1-5 Units Freq: AT BEDTIME Route: SC  Start: 08/25/17 2200   Admin Instructions: MEDIUM INSULIN RESISTANCE DOSING    Do Not give Bedtime Correction Insulin if BG less than  200.   For  - 249 give 1 units.   For  - 299 give 2 units.   For  - 349 give 3  units.   For  -399 give 4 units.   For BG greater than or equal to 400 give 5 units.  Notify provider if glucose greater than or equal to 350 mg/dL after administration of correction dose.  If given at mealtime, must be administered 5 min before meal or immediately after.      (2218)-Not Given        (2243)-Not Given        0807-Auto Hold       1410-Unhold       2212 (2 Units)-Given [C]        2141 (3 Units)-Given        2223 (2 Units)-Given [C]        [ ] 2200           insulin aspart (NovoLOG) inj (RAPID ACTING)  Dose: 1-7 Units Freq: 3 TIMES DAILY BEFORE MEALS Route: SC  Start: 08/25/17 1200   Admin Instructions: Correction Scale - MEDIUM INSULIN RESISTANCE DOSING     Do Not give Correction Insulin if Pre-Meal BG less than 140.   For Pre-Meal  - 189 give 1 unit.   For Pre-Meal  - 239 give 2 units.   For Pre-Meal  - 289 give 3 units.   For Pre-Meal  - 339 give 4 units.   For Pre-Meal - 399 give 5 units.   For Pre-Meal -449 give 6 units  For Pre-Meal BG greater than or equal to 450 give 7 units.   To be given with prandial insulin, and based on pre-meal blood glucose.    Notify provider if glucose greater than or equal to 350 mg/dL after administration of correction dose.  If given at mealtime, must be administered 5 min before meal or immediately after.      (1303)-Not Given       (1718)-Not Given        (0844)-Not Given       1519 (2 Units)-Given       (2000)-Not Given [C]        0807-Auto Hold       1200-Automatically Held       1410-Unhold       (1426)-Not Given       (1649)-Not Given        0843 (1 Units)-Given       (1455)-Not Given       1940 (3 Units)-Given        0906 (1 Units)-Given       1149 (3 Units)-Given       1645 (2 Units)-Given [C]        0748 (1 Units)-Given [C]       1157 (2 Units)-Given [C]       [ ] 1700           insulin glargine (LANTUS) injection 25 Units  Dose: 25 Units Freq: AT BEDTIME Route: SC  Start: 08/29/17 2200         2222 (25  Units)-Given        [ ] 2200           magnesium hydroxide (MILK OF MAGNESIA) suspension 30 mL  Dose: 30 mL Freq: DAILY PRN Route: PO  PRN Reason: constipation  Start: 08/27/17 1519   Admin Instructions: Shake well.               magnesium sulfate 2 g in NS intermittent infusion (PharMEDium or FV Cmpd)  Dose: 2 g Freq: DAILY PRN Route: IV  PRN Reason: magnesium supplementation  Start: 08/28/17 0938   Admin Instructions: For Serum Mg++ 1.6 - 2 mg/dL  Give 2 g and recheck magnesium level next AM.               magnesium sulfate 4 g in 100 mL sterile water (premade)  Dose: 4 g Freq: EVERY 4 HOURS PRN Route: IV  PRN Reason: magnesium supplementation  Start: 08/28/17 0938   Admin Instructions: For serum Mg++ less than 1.6 mg/dL  Give 4 g and recheck magnesium level 2 hours after dose, and next AM.         1016 (4 g)-New Bag             methocarbamol (ROBAXIN) tablet 750 mg  Dose: 750 mg Freq: 4 TIMES DAILY Route: PO  Start: 08/29/17 1200         1150 (750 mg)-Given       1645 (750 mg)-Given       2012 (750 mg)-Given        0748 (750 mg)-Given       1157 (750 mg)-Given       [ ] 1600       [ ] 2000           naloxone (NARCAN) injection 0.1-0.4 mg  Dose: 0.1-0.4 mg Freq: EVERY 2 MIN PRN Route: IV  PRN Reason: opioid reversal  Start: 08/25/17 0150   Admin Instructions: For respiratory rate LESS than or EQUAL to 8.  Partial reversal dose:  0.1 mg titrated q 2 minutes for Analgesia Side Effects Monitoring Sedation Level of 3 (frequently drowsy, arousable, drifts to sleep during conversation).Full reversal dose:  0.4 mg bolus for Analgesia Side Effects Monitoring Sedation Level of 4 (somnolent, minimal or no response to stimulation).        0807-Auto Hold       1410-Unhold              ondansetron (ZOFRAN) injection 4-8 mg  Dose: 4-8 mg Freq: EVERY 6 HOURS PRN Route: IV  PRN Reasons: nausea,vomiting  Start: 08/25/17 0951   Admin Instructions: Irritant.        0807-Auto Hold       1410-Unhold              oxyCODONE (ROXICODONE)  IR tablet 5-10 mg  Dose: 5-10 mg Freq: EVERY 3 HOURS PRN Route: PO  PRN Reason: moderate to severe pain  Start: 08/29/17 0915         1220 (5 mg)-Given       1802 (10 mg)-Given       2224 (10 mg)-Given        0747 (10 mg)-Given           polyethylene glycol (MIRALAX/GLYCOLAX) Packet 17 g  Dose: 17 g Freq: DAILY Route: PO  Start: 08/26/17 0800   Admin Instructions: Hold for loose stools  1 Packet = 17 grams. Mixed prescribed dose in 8 ounces of water. Follow with 8 oz. of water.       0854 (17 g)-Given        0807-Auto Hold       1410-Unhold       (1427)-Not Given        (0841)-Not Given        0903 (17 g)-Given        0748 (17 g)-Given           potassium chloride (KLOR-CON) Packet 20-40 mEq  Dose: 20-40 mEq Freq: EVERY 2 HOURS PRN Route: ORAL OR FEED  PRN Reason: potassium supplementation  Start: 08/28/17 0939   Admin Instructions: Use if unable to tolerate tablets.  If Serum K+ 3.0-3.3, dose = 60 mEq po total dose (40 mEq x1 followed in 2 hours by 20 mEq x1). Recheck K+ level 4 hours after dose and the next AM.  If Serum K+ 2.5-2.9, dose = 80 mEq po total dose (40 mEq Q2H x2). Recheck K+ level 4 hours after dose and the next AM.  If Serum K+ less than 2.5, See IV order.  Dissolve packet contents in 4-8 ounces of cold water or juice.               potassium chloride 10 mEq in 100 mL intermittent infusion  Dose: 10 mEq Freq: EVERY 1 HOUR PRN Route: IV  PRN Reason: potassium supplementation  Start: 08/28/17 0939   Admin Instructions: Infuse via PERIPHERAL LINE or CENTRAL LINE. Use for central line replacement if patient weight less than 65 kg, if patient is on TPN with high potassium content or if unit does not stock 20 mEq bags.   If Serum K+ 3.0-3.3, dose = 10 mEq/hr x4 doses (40 mEq IV total dose). Recheck K+ level 2 hours after dose and the next AM.   If Serum K+ less than 3.0, dose = 10 mEq/hr x6 doses (60 mEq IV total dose). Recheck K+ level 2 hours after dose and the next AM.               potassium chloride  10 mEq in 100 mL intermittent infusion with 10 mg lidocaine  Dose: 10 mEq Freq: EVERY 1 HOUR PRN Route: IV  PRN Reason: potassium supplementation  Start: 08/28/17 0939   Admin Instructions: Infuse via PERIPHERAL LINE. Use potassium with lidocaine for pain with peripheral administration.  If Serum K+ 3.0-3.3, dose = 10 mEq/hr x4 doses (40 mEq IV total dose). Recheck K+ level 2 hours after dose and the next AM.  If Serum K+ less than 3.0, dose = 10 mEq/hr x6 doses (60 mEq IV total dose). Recheck K+ level 2 hours after dose and the next AM.               potassium chloride 20 mEq in 50 mL intermittent infusion  Dose: 20 mEq Freq: EVERY 1 HOUR PRN Route: IV  PRN Reason: potassium supplementation  Start: 08/28/17 0939   Admin Instructions: Infuse via CENTRAL LINE Only. May need EKG if less than 65 kg or on TPN - Max rate is 0.3 mEq/kg/hr for patients not on EKG monitoring.   If Serum K+ 3.0-3.3, dose = 20 mEq/hr x2 doses (40 mEq IV total dose). Recheck K+ level 2 hours after dose and the next AM.  If Serum K+ less than 3.0, dose = 20 mEq/hr x3 doses (60 mEq IV total dose). Recheck K+ level 2 hours after dose and the next AM.               potassium chloride SA (K-DUR/KLOR-CON M) CR tablet 20-40 mEq  Dose: 20-40 mEq Freq: EVERY 2 HOURS PRN Route: PO  PRN Reason: potassium supplementation  Start: 08/28/17 0939   Admin Instructions: Use if able to take PO.   If Serum K+ 3.0-3.3, dose = 60 mEq po total dose (40 mEq x1 followed in 2 hours by 20 mEq x1). Recheck K+ level 4 hours after dose and the next AM.  If Serum K+ 2.5-2.9, dose = 80 mEq po total dose (40 mEq Q2H x2). Recheck K+ level 4 hours after dose and the next AM.  If Serum K+ less than 2.5, See IV order.  DO NOT CRUSH.               senna-docusate (SENOKOT-S;PERICOLACE) 8.6-50 MG per tablet 2 tablet  Dose: 2 tablet Freq: 2 TIMES DAILY Route: PO  Start: 08/25/17 1100   Admin Instructions: Hold for loose stools      1153 (2 tablet)-Given       1950 (2  tablet)-Given        (0845)-Not Given [C]       (1930)-Not Given        0807-Auto Hold       1410-Unhold       (1427)-Not Given       (1942)-Not Given        (0841)-Not Given       1937 (2 tablet)-Given        0904 (2 tablet)-Given       2012 (2 tablet)-Given        0748 (2 tablet)-Given       [ ] 2000           silver sulfADIAZINE (SILVADENE) 1 % cream  Freq: DAILY Route: Top  Start: 08/25/17 1015   Admin Instructions: Apply to right ulcer      1153 ( )-Given        0909 ( )-Given        0807-Auto Hold       1410-Unhold       1740 ( )-Given        0848 ( )-Given        0904 ( )-Given        0747 ( )-Given           simvastatin (ZOCOR) tablet 10 mg  Dose: 10 mg Freq: AT BEDTIME Route: PO  Start: 08/25/17 2200     (2228)-Not Given        2223 (10 mg)-Given        0807-Auto Hold       1410-Unhold       2202 (10 mg)-Given        2142 (10 mg)-Given        2224 (10 mg)-Given        [ ] 2200           Warfarin Therapy Reminder (Check START DATE - warfarin may be starting in the FUTURE)  Freq: CONTINUOUS PRN Route: XX  Start: 08/27/17 1412   Admin Instructions: *Note to reorder warfarin daily*  Pharmacy Warfarin Dosing Service  Patient is on Warfarin Therapy - check for daily order              Future Medications  Medications 08/24/17 08/25/17 08/26/17 08/27/17 08/28/17 08/29/17 08/30/17       warfarin (COUMADIN) tablet 2 mg  Dose: 2 mg Freq: ONCE AT 6PM Route: PO  Start: 08/30/17 1800          [ ] 1800          Completed Medications  Medications 08/24/17 08/25/17 08/26/17 08/27/17 08/28/17 08/29/17 08/30/17         Dose: 1 g Freq: EVERY 8 HOURS Route: IV  Indications of Use: SURGICAL PROPHYLAXIS  Start: 08/27/17 1415   End: 08/27/17 2233       1528 (1 g)-New Bag       2203 (1 g)-New Bag                Dose: 4 mg Freq: ONCE AT 6PM Route: PO  Start: 08/29/17 1800   End: 08/29/17 1802         1802 (4 mg)-Given              Dose: 5 mg Freq: ONCE AT 6PM Route: PO  Start: 08/28/17 1800   End: 08/28/17 1936        1936 (5  mg)-Given               Dose: 5 mg Freq: ONCE AT 6PM Route: PO  Start: 08/27/17 1800   End: 08/27/17 1822       1822 (5 mg)-Given             Discontinued Medications  Medications 08/24/17 08/25/17 08/26/17 08/27/17 08/28/17 08/29/17 08/30/17         Dose: 500 mg Freq: 2 TIMES DAILY Route: PO  Start: 08/28/17 0900   End: 08/28/17 0852        0852-Med Discontinued           Dose: 81 mg Freq: DAILY Route: PO  Start: 08/28/17 0900   End: 08/28/17 1007        1007-Med Discontinued  (1010)-Not Given               Dose: 25-50 mcg Freq: EVERY 2 MIN PRN Route: IV  PRN Reason: other  PRN Comment: acute pain  Start: 08/27/17 1146   End: 08/27/17 1410   Admin Instructions: MAX cumulative dose = 250 mcg.    Use Fentanyl initially, as a short acting agent for acute pain control.  If insufficient, or a longer acting agent is needed, begin Morphine or Hydromorphone if ordered.        1244 (25 mcg)-Given       1328 (25 mcg)-Given       1348 (25 mcg)-Given       1410-Med Discontinued            Dose: 25-50 mcg Freq: EVERY 2 MIN PRN Route: IV  PRN Reason: other  PRN Comment: acute pain  Start: 08/27/17 1146   End: 08/27/17 1410   Admin Instructions: MAX cumulative dose = 250 mcg.    Use Fentanyl initially, as a short acting agent for acute pain control.  If insufficient, or a longer acting agent is needed, begin Morphine or Hydromorphone if ordered.        1410-Med Discontinued            Dose: 0.3-0.5 mg Freq: EVERY 5 MIN PRN Route: IV  PRN Reason: other  PRN Comment: acute pain.  May administer if Respiratory Rate is greater than 10  Start: 08/27/17 1146   End: 08/27/17 1410   Admin Instructions: If fentanyl is also ordered, use HYDROmorphone if pain control insufficient with fentanyl or a longer acting agent is needed.   Max cumulative dose = 2 mg        1410-Med Discontinued            Dose: 0.3-0.5 mg Freq: EVERY 5 MIN PRN Route: IV  PRN Reason: other  PRN Comment: acute pain.  May administer if Respiratory Rate is greater than  10  Start: 08/27/17 1146   End: 08/27/17 1410   Admin Instructions: If fentanyl is also ordered, use HYDROmorphone if pain control insufficient with fentanyl or a longer acting agent is needed.   Max cumulative dose = 2 mg        1410-Med Discontinued            Dose: 12 Units Freq: AT BEDTIME Route: SC  Start: 08/27/17 2200   End: 08/29/17 0829       2202 (12 Units)-Given        2142 (12 Units)-Given        0829-Med Discontinued          Dose: 10 mg Freq: ONCE PRN Route: IV  PRN Reason: high blood pressure  PRN Comment: for Systemic Blood Pressure greater than 160 and Heart Rate greater than 60 bpm.    Start: 08/27/17 1146   End: 08/27/17 1410   Admin Instructions: For PACU USE ONLY.  DC WHEN TRANSFERRED TO FLOOR.        1410-Med Discontinued            Dose: 10 mg Freq: ONCE PRN Route: IV  PRN Reason: high blood pressure  PRN Comment: for Systemic Blood Pressure greater than 160 and Heart Rate greater than 60 bpm.    Start: 08/27/17 1146   End: 08/27/17 1410   Admin Instructions: For PACU USE ONLY.  DC WHEN TRANSFERRED TO FLOOR.        1410-Med Discontinued            Rate: 100 mL/hr Freq: CONTINUOUS Route: IV  Start: 08/27/17 1200   End: 08/27/17 1410   Admin Instructions: Continue until IV catheter is weaned        1235 ( )-New Bag       1410-Med Discontinued            Rate: 75 mL/hr Freq: CONTINUOUS Route: IV  Start: 08/27/17 1200   End: 08/27/17 1410   Admin Instructions: Continue until IV catheter is weaned               1410-Med Discontinued            Dose: 500 mg Freq: 4 TIMES DAILY Route: PO  Start: 08/26/17 0945   End: 08/29/17 0905      1057 (500 mg)-Given       (1341)-Not Given       1519 (500 mg)-Given       1929 (500 mg)-Given        0807-Auto Hold       1200-Automatically Held       1410-Unhold       (1426)-Not Given       1528 (500 mg)-Given       1941 (500 mg)-Given        0843 (500 mg)-Given       1228 (500 mg)-Given       1621 (500 mg)-Given       1937 (500 mg)-Given        0905-Med  Discontinued  (0906)-Not Given              Dose: 4 mg Freq: EVERY 30 MIN PRN Route: PO  PRN Reason: nausea  Start: 08/27/17 1146   End: 08/27/17 1410   Admin Instructions: MAX total dose = 8 mg, including OR dosing. If not resolved in 15 minutes, then go to step 2 (Prochlorperazine if ordered).               1410-Med Discontinued         Or    Dose: 4 mg Freq: EVERY 30 MIN PRN Route: IV  PRN Reason: nausea  Start: 08/27/17 1146   End: 08/27/17 1410   Admin Instructions: MAX total dose = 8 mg, including OR dosing. If not resolved in 15 minutes, then go to step 2 (Prochlorperazine if ordered).  Irritant.        1258 (4 mg)-Given       1410-Med Discontinued            Dose: 4 mg Freq: EVERY 30 MIN PRN Route: PO  PRN Reason: nausea  Start: 08/27/17 1146   End: 08/27/17 1410   Admin Instructions: MAX total dose = 8 mg, including OR dosing. If not resolved in 15 minutes, then go to step 2 (Prochlorperazine if ordered).        1410-Med Discontinued         Or    Dose: 4 mg Freq: EVERY 30 MIN PRN Route: IV  PRN Reason: nausea  Start: 08/27/17 1146   End: 08/27/17 1410   Admin Instructions: MAX total dose = 8 mg, including OR dosing. If not resolved in 15 minutes, then go to step 2 (Prochlorperazine if ordered).  Irritant.        1410-Med Discontinued            Dose: 5-10 mg Freq: EVERY 4 HOURS PRN Route: PO  PRN Reason: moderate to severe pain  Start: 08/27/17 1519   End: 08/29/17 0905       1739 (5 mg)-Given       2332 (5 mg)-Given        0604 (5 mg)-Given       1621 (5 mg)-Given       1937 (5 mg)-Given        0905-Med Discontinued          Dose: 5 mg Freq: EVERY 6 HOURS PRN Route: IV  PRN Reasons: nausea,vomiting  Start: 08/27/17 1146   End: 08/27/17 1410   Admin Instructions: This is Step 2 of the nausea and vomiting protocol.   If nausea not resolved in 15 minutes, give Metoclopramide if ordered (step 3 of nausea and vomiting protocol)          1410-Med Discontinued            Dose: 5 mg Freq: EVERY 6 HOURS PRN Route:  IV  PRN Reasons: nausea,vomiting  Start: 08/27/17 1146   End: 08/27/17 1410   Admin Instructions: This is Step 2 of the nausea and vomiting protocol.   If nausea not resolved in 15 minutes, give Metoclopramide if ordered (step 3 of nausea and vomiting protocol)          1410-Med Discontinued            Freq: PRN  Start: 08/27/17 1021   End: 08/27/17 1410       1021 (2,000 mL)-Given       1410-Med Discontinued            Rate: 6.3 mL/hr Freq: CONTINUOUS Route: IV  Last Dose: 125 mg/hr (08/27/17 1237)  Start: 08/27/17 0845   End: 08/27/17 1644       1011 (1 g)-New Bag       1237 (125 mg/hr)-Rate/Dose Verify       1644-Med Discontinued       Medications 08/24/17 08/25/17 08/26/17 08/27/17 08/28/17 08/29/17 08/30/17

## 2017-08-25 ENCOUNTER — APPOINTMENT (OUTPATIENT)
Dept: GENERAL RADIOLOGY | Facility: CLINIC | Age: 72
DRG: 470 | End: 2017-08-25
Payer: MEDICARE

## 2017-08-25 PROBLEM — S72.009A FRACTURE OF NECK OF FEMUR (H): Status: ACTIVE | Noted: 2017-08-25

## 2017-08-25 LAB
ABO + RH BLD: NORMAL
ABO + RH BLD: NORMAL
ANION GAP SERPL CALCULATED.3IONS-SCNC: 6 MMOL/L (ref 3–14)
BLD GP AB SCN SERPL QL: NORMAL
BLD PROD TYP BPU: NORMAL
BLOOD BANK CMNT PATIENT-IMP: NORMAL
BUN SERPL-MCNC: 27 MG/DL (ref 7–30)
CALCIUM SERPL-MCNC: 7.8 MG/DL (ref 8.5–10.1)
CHLORIDE SERPL-SCNC: 107 MMOL/L (ref 94–109)
CO2 SERPL-SCNC: 23 MMOL/L (ref 20–32)
CREAT SERPL-MCNC: 1.15 MG/DL (ref 0.66–1.25)
GFR SERPL CREATININE-BSD FRML MDRD: 63 ML/MIN/1.7M2
GLUCOSE BLDC GLUCOMTR-MCNC: 119 MG/DL (ref 70–99)
GLUCOSE BLDC GLUCOMTR-MCNC: 128 MG/DL (ref 70–99)
GLUCOSE BLDC GLUCOMTR-MCNC: 162 MG/DL (ref 70–99)
GLUCOSE BLDC GLUCOMTR-MCNC: 65 MG/DL (ref 70–99)
GLUCOSE BLDC GLUCOMTR-MCNC: 77 MG/DL (ref 70–99)
GLUCOSE BLDC GLUCOMTR-MCNC: 99 MG/DL (ref 70–99)
GLUCOSE SERPL-MCNC: 95 MG/DL (ref 70–99)
INR PPP: 1.13 (ref 0.86–1.14)
INTERPRETATION ECG - MUSE: NORMAL
NUM BPU REQUESTED: 2
POTASSIUM SERPL-SCNC: 3.9 MMOL/L (ref 3.4–5.3)
RADIOLOGIST FLAGS: ABNORMAL
RADIOLOGIST FLAGS: ABNORMAL
RADIOLOGIST FLAGS: NORMAL
SODIUM SERPL-SCNC: 137 MMOL/L (ref 133–144)
SPECIMEN EXP DATE BLD: NORMAL

## 2017-08-25 PROCEDURE — 25000132 ZZH RX MED GY IP 250 OP 250 PS 637: Mod: GY | Performed by: HOSPITALIST

## 2017-08-25 PROCEDURE — 85610 PROTHROMBIN TIME: CPT | Performed by: STUDENT IN AN ORGANIZED HEALTH CARE EDUCATION/TRAINING PROGRAM

## 2017-08-25 PROCEDURE — A9270 NON-COVERED ITEM OR SERVICE: HCPCS | Mod: GY | Performed by: STUDENT IN AN ORGANIZED HEALTH CARE EDUCATION/TRAINING PROGRAM

## 2017-08-25 PROCEDURE — 86901 BLOOD TYPING SEROLOGIC RH(D): CPT | Performed by: STUDENT IN AN ORGANIZED HEALTH CARE EDUCATION/TRAINING PROGRAM

## 2017-08-25 PROCEDURE — 12000008 ZZH R&B INTERMEDIATE UMMC

## 2017-08-25 PROCEDURE — 80048 BASIC METABOLIC PNL TOTAL CA: CPT | Performed by: HOSPITALIST

## 2017-08-25 PROCEDURE — 25000132 ZZH RX MED GY IP 250 OP 250 PS 637: Mod: GY | Performed by: PHYSICIAN ASSISTANT

## 2017-08-25 PROCEDURE — 00000146 ZZHCL STATISTIC GLUCOSE BY METER IP

## 2017-08-25 PROCEDURE — 86850 RBC ANTIBODY SCREEN: CPT | Performed by: STUDENT IN AN ORGANIZED HEALTH CARE EDUCATION/TRAINING PROGRAM

## 2017-08-25 PROCEDURE — 99212 OFFICE O/P EST SF 10 MIN: CPT

## 2017-08-25 PROCEDURE — 25000128 H RX IP 250 OP 636: Performed by: PHYSICIAN ASSISTANT

## 2017-08-25 PROCEDURE — 25800025 ZZH RX 258

## 2017-08-25 PROCEDURE — 36415 COLL VENOUS BLD VENIPUNCTURE: CPT | Performed by: HOSPITALIST

## 2017-08-25 PROCEDURE — 25000128 H RX IP 250 OP 636: Performed by: FAMILY MEDICINE

## 2017-08-25 PROCEDURE — 86900 BLOOD TYPING SEROLOGIC ABO: CPT | Performed by: STUDENT IN AN ORGANIZED HEALTH CARE EDUCATION/TRAINING PROGRAM

## 2017-08-25 PROCEDURE — 25000125 ZZHC RX 250: Performed by: PHYSICIAN ASSISTANT

## 2017-08-25 PROCEDURE — 73080 X-RAY EXAM OF ELBOW: CPT | Mod: LT

## 2017-08-25 PROCEDURE — A9270 NON-COVERED ITEM OR SERVICE: HCPCS | Mod: GY | Performed by: HOSPITALIST

## 2017-08-25 PROCEDURE — 86923 COMPATIBILITY TEST ELECTRIC: CPT | Performed by: STUDENT IN AN ORGANIZED HEALTH CARE EDUCATION/TRAINING PROGRAM

## 2017-08-25 PROCEDURE — 99223 1ST HOSP IP/OBS HIGH 75: CPT | Performed by: HOSPITALIST

## 2017-08-25 PROCEDURE — 25000132 ZZH RX MED GY IP 250 OP 250 PS 637: Mod: GY | Performed by: STUDENT IN AN ORGANIZED HEALTH CARE EDUCATION/TRAINING PROGRAM

## 2017-08-25 PROCEDURE — 71010 XR CHEST PORT 1 VW: CPT

## 2017-08-25 PROCEDURE — A9270 NON-COVERED ITEM OR SERVICE: HCPCS | Mod: GY | Performed by: PHYSICIAN ASSISTANT

## 2017-08-25 RX ORDER — DEXTROSE MONOHYDRATE 25 G/50ML
INJECTION, SOLUTION INTRAVENOUS
Status: COMPLETED
Start: 2017-08-25 | End: 2017-08-25

## 2017-08-25 RX ORDER — ACETAMINOPHEN 500 MG
1000 TABLET ORAL 3 TIMES DAILY
Status: DISCONTINUED | OUTPATIENT
Start: 2017-08-25 | End: 2017-08-30 | Stop reason: HOSPADM

## 2017-08-25 RX ORDER — ONDANSETRON 2 MG/ML
4-8 INJECTION INTRAMUSCULAR; INTRAVENOUS EVERY 6 HOURS PRN
Status: DISCONTINUED | OUTPATIENT
Start: 2017-08-25 | End: 2017-08-30 | Stop reason: HOSPADM

## 2017-08-25 RX ORDER — NALOXONE HYDROCHLORIDE 0.4 MG/ML
.1-.4 INJECTION, SOLUTION INTRAMUSCULAR; INTRAVENOUS; SUBCUTANEOUS
Status: DISCONTINUED | OUTPATIENT
Start: 2017-08-25 | End: 2017-08-30 | Stop reason: HOSPADM

## 2017-08-25 RX ORDER — ASCORBIC ACID 500 MG
500 TABLET ORAL 2 TIMES DAILY
Status: ON HOLD | COMMUNITY
End: 2017-08-30

## 2017-08-25 RX ORDER — ASCORBIC ACID 500 MG
1000 TABLET ORAL DAILY
Status: DISCONTINUED | OUTPATIENT
Start: 2017-08-25 | End: 2017-08-30 | Stop reason: HOSPADM

## 2017-08-25 RX ORDER — ACETAMINOPHEN 500 MG
1000 TABLET ORAL ONCE
Status: COMPLETED | OUTPATIENT
Start: 2017-08-25 | End: 2017-08-25

## 2017-08-25 RX ORDER — DEXTROSE MONOHYDRATE 25 G/50ML
25-50 INJECTION, SOLUTION INTRAVENOUS
Status: DISCONTINUED | OUTPATIENT
Start: 2017-08-25 | End: 2017-08-25

## 2017-08-25 RX ORDER — ACETAMINOPHEN 650 MG/1
650 SUPPOSITORY RECTAL EVERY 4 HOURS PRN
Status: DISCONTINUED | OUTPATIENT
Start: 2017-08-25 | End: 2017-08-25

## 2017-08-25 RX ORDER — GENTAMICIN SULFATE 1 MG/G
CREAM TOPICAL DAILY
Status: DISCONTINUED | OUTPATIENT
Start: 2017-08-25 | End: 2017-08-30 | Stop reason: HOSPADM

## 2017-08-25 RX ORDER — NICOTINE POLACRILEX 4 MG
15-30 LOZENGE BUCCAL
Status: DISCONTINUED | OUTPATIENT
Start: 2017-08-25 | End: 2017-08-25

## 2017-08-25 RX ORDER — NICOTINE POLACRILEX 4 MG
15-30 LOZENGE BUCCAL
Status: DISCONTINUED | OUTPATIENT
Start: 2017-08-25 | End: 2017-08-30 | Stop reason: HOSPADM

## 2017-08-25 RX ORDER — CHLORHEXIDINE GLUCONATE 40 MG/ML
SOLUTION TOPICAL ONCE
Status: DISCONTINUED | OUTPATIENT
Start: 2017-08-25 | End: 2017-08-27 | Stop reason: HOSPADM

## 2017-08-25 RX ORDER — AMOXICILLIN 250 MG
2 CAPSULE ORAL 2 TIMES DAILY
Status: DISCONTINUED | OUTPATIENT
Start: 2017-08-25 | End: 2017-08-30 | Stop reason: HOSPADM

## 2017-08-25 RX ORDER — BISACODYL 10 MG
10 SUPPOSITORY, RECTAL RECTAL DAILY PRN
Status: DISCONTINUED | OUTPATIENT
Start: 2017-08-25 | End: 2017-08-30 | Stop reason: HOSPADM

## 2017-08-25 RX ORDER — HYDROMORPHONE HYDROCHLORIDE 1 MG/ML
.3-.5 INJECTION, SOLUTION INTRAMUSCULAR; INTRAVENOUS; SUBCUTANEOUS
Status: DISCONTINUED | OUTPATIENT
Start: 2017-08-25 | End: 2017-08-30 | Stop reason: HOSPADM

## 2017-08-25 RX ORDER — ONDANSETRON 2 MG/ML
4 INJECTION INTRAMUSCULAR; INTRAVENOUS EVERY 6 HOURS PRN
Status: DISCONTINUED | OUTPATIENT
Start: 2017-08-25 | End: 2017-08-25

## 2017-08-25 RX ORDER — CHLORHEXIDINE GLUCONATE 40 MG/ML
SOLUTION TOPICAL ONCE
Status: DISCONTINUED | OUTPATIENT
Start: 2017-08-25 | End: 2017-08-26

## 2017-08-25 RX ORDER — ACETAMINOPHEN 325 MG/1
650 TABLET ORAL EVERY 4 HOURS PRN
Status: DISCONTINUED | OUTPATIENT
Start: 2017-08-25 | End: 2017-08-25

## 2017-08-25 RX ORDER — SIMVASTATIN 10 MG
10 TABLET ORAL AT BEDTIME
Status: DISCONTINUED | OUTPATIENT
Start: 2017-08-25 | End: 2017-08-30 | Stop reason: HOSPADM

## 2017-08-25 RX ORDER — SILVER SULFADIAZINE 10 MG/G
CREAM TOPICAL DAILY
Status: DISCONTINUED | OUTPATIENT
Start: 2017-08-25 | End: 2017-08-30 | Stop reason: HOSPADM

## 2017-08-25 RX ORDER — POLYETHYLENE GLYCOL 3350 17 G/17G
17 POWDER, FOR SOLUTION ORAL DAILY
Status: DISCONTINUED | OUTPATIENT
Start: 2017-08-26 | End: 2017-08-30 | Stop reason: HOSPADM

## 2017-08-25 RX ORDER — DEXTROSE MONOHYDRATE 25 G/50ML
25-50 INJECTION, SOLUTION INTRAVENOUS
Status: DISCONTINUED | OUTPATIENT
Start: 2017-08-25 | End: 2017-08-30 | Stop reason: HOSPADM

## 2017-08-25 RX ORDER — DEXTROSE, SODIUM CHLORIDE, SODIUM LACTATE, POTASSIUM CHLORIDE, AND CALCIUM CHLORIDE 5; .6; .31; .03; .02 G/100ML; G/100ML; G/100ML; G/100ML; G/100ML
INJECTION, SOLUTION INTRAVENOUS CONTINUOUS
Status: DISCONTINUED | OUTPATIENT
Start: 2017-08-25 | End: 2017-08-25

## 2017-08-25 RX ORDER — HYDROMORPHONE HCL/0.9% NACL/PF 0.2MG/0.2
0.2 SYRINGE (ML) INTRAVENOUS
Status: DISCONTINUED | OUTPATIENT
Start: 2017-08-25 | End: 2017-08-25

## 2017-08-25 RX ORDER — FERROUS SULFATE 325(65) MG
325 TABLET ORAL 2 TIMES DAILY
Status: DISCONTINUED | OUTPATIENT
Start: 2017-08-25 | End: 2017-08-30 | Stop reason: HOSPADM

## 2017-08-25 RX ORDER — SODIUM CHLORIDE 9 MG/ML
1000 INJECTION, SOLUTION INTRAVENOUS CONTINUOUS
Status: DISCONTINUED | OUTPATIENT
Start: 2017-08-25 | End: 2017-08-25

## 2017-08-25 RX ADMIN — SENNOSIDES AND DOCUSATE SODIUM 2 TABLET: 8.6; 5 TABLET ORAL at 19:50

## 2017-08-25 RX ADMIN — Medication 25 ML: at 08:27

## 2017-08-25 RX ADMIN — HYDROMORPHONE HYDROCHLORIDE 0.3 MG: 1 INJECTION, SOLUTION INTRAMUSCULAR; INTRAVENOUS; SUBCUTANEOUS at 20:03

## 2017-08-25 RX ADMIN — HYDROMORPHONE HYDROCHLORIDE 0.3 MG: 1 INJECTION, SOLUTION INTRAMUSCULAR; INTRAVENOUS; SUBCUTANEOUS at 06:13

## 2017-08-25 RX ADMIN — GENTAMICIN SULFATE: 1 CREAM TOPICAL at 11:54

## 2017-08-25 RX ADMIN — ACETAMINOPHEN 1000 MG: 500 TABLET, FILM COATED ORAL at 00:32

## 2017-08-25 RX ADMIN — OXYCODONE HYDROCHLORIDE AND ACETAMINOPHEN 1000 MG: 500 TABLET ORAL at 09:03

## 2017-08-25 RX ADMIN — HYDROMORPHONE HYDROCHLORIDE 0.3 MG: 1 INJECTION, SOLUTION INTRAMUSCULAR; INTRAVENOUS; SUBCUTANEOUS at 02:44

## 2017-08-25 RX ADMIN — DEXTROSE MONOHYDRATE 25 ML: 25 INJECTION, SOLUTION INTRAVENOUS at 08:27

## 2017-08-25 RX ADMIN — SILVER SULFADIAZINE: 10 CREAM TOPICAL at 11:53

## 2017-08-25 RX ADMIN — ACETAMINOPHEN 1000 MG: 500 TABLET, FILM COATED ORAL at 19:50

## 2017-08-25 RX ADMIN — ACETAMINOPHEN 1000 MG: 500 TABLET, FILM COATED ORAL at 11:51

## 2017-08-25 RX ADMIN — SODIUM CHLORIDE, SODIUM LACTATE, POTASSIUM CHLORIDE, CALCIUM CHLORIDE AND DEXTROSE MONOHYDRATE: 5; 600; 310; 30; 20 INJECTION, SOLUTION INTRAVENOUS at 09:03

## 2017-08-25 RX ADMIN — ENOXAPARIN SODIUM 40 MG: 40 INJECTION SUBCUTANEOUS at 11:54

## 2017-08-25 RX ADMIN — HYDROMORPHONE HYDROCHLORIDE 0.3 MG: 1 INJECTION, SOLUTION INTRAMUSCULAR; INTRAVENOUS; SUBCUTANEOUS at 12:00

## 2017-08-25 RX ADMIN — ACETAMINOPHEN 650 MG: 325 TABLET, FILM COATED ORAL at 08:15

## 2017-08-25 RX ADMIN — FERROUS SULFATE TAB 325 MG (65 MG ELEMENTAL FE) 325 MG: 325 (65 FE) TAB at 19:50

## 2017-08-25 RX ADMIN — SENNOSIDES AND DOCUSATE SODIUM 2 TABLET: 8.6; 5 TABLET ORAL at 11:53

## 2017-08-25 RX ADMIN — HYDROMORPHONE HYDROCHLORIDE 0.3 MG: 1 INJECTION, SOLUTION INTRAMUSCULAR; INTRAVENOUS; SUBCUTANEOUS at 10:11

## 2017-08-25 RX ADMIN — SODIUM CHLORIDE 1000 ML: 9 INJECTION, SOLUTION INTRAVENOUS at 02:24

## 2017-08-25 RX ADMIN — HYDROMORPHONE HYDROCHLORIDE 0.3 MG: 1 INJECTION, SOLUTION INTRAMUSCULAR; INTRAVENOUS; SUBCUTANEOUS at 08:16

## 2017-08-25 RX ADMIN — FERROUS SULFATE TAB 325 MG (65 MG ELEMENTAL FE) 325 MG: 325 (65 FE) TAB at 09:03

## 2017-08-25 ASSESSMENT — ENCOUNTER SYMPTOMS
NECK STIFFNESS: 0
SORE THROAT: 0
HEMATURIA: 0
VOMITING: 0
RHINORRHEA: 0
DIARRHEA: 0
WOUND: 0
WHEEZING: 0
AGITATION: 0
NAUSEA: 0
DYSURIA: 0
CONFUSION: 0
DECREASED CONCENTRATION: 0
SHORTNESS OF BREATH: 0
ABDOMINAL PAIN: 0
NECK PAIN: 0
DIZZINESS: 0
HEADACHES: 0
BACK PAIN: 0
APPETITE CHANGE: 0
SEIZURES: 0
FATIGUE: 0
LIGHT-HEADEDNESS: 0
FEVER: 0
BLOOD IN STOOL: 0
COUGH: 0
CHILLS: 0

## 2017-08-25 NOTE — PLAN OF CARE
"Problem: Goal Outcome Summary  Goal: Goal Outcome Summary  Outcome: No Change  Vitals:     08/25/17 0045 08/25/17 0100 08/25/17 0202 08/25/17 0234   BP: (!) 162/97 158/77 157/68     Pulse:           Resp:     14     Temp:     99.1  F (37.3  C)     TempSrc:     Oral     SpO2: 99% 98% 98%     Weight:       89.9 kg (198 lb 1.6 oz)   Height:       1.88 m (6' 2\")     Patient arrived onto  at 0202 from ED with left hip fracture from fall. Patient oriented to room and call light. Admit completed. Ortho MD saw patient and patient consent was completed. PRN Dilaudid 0.3mg x2 effective for pain control, patient slept. Voided 200cc clear yellow urine. Repositioned with assist of 2. NPO, MIVF infusing per orders. Cleansed surgery site with Gabriel wipes. CMS and pulses intact, patient has baseline numbness (neuropathy) in bilateral feet. Patient had right lower leg wound prior to admission, was being treated by wound care nurse outpatient, small dried drainage noted on dressing.BG 99 when patient arrived onto 7B. Slept in between cares. Continue POC.       "

## 2017-08-25 NOTE — PROVIDER NOTIFICATION
RN - Notified primary team regarding pt blood glucose of 65. Currently no hypoglycemia orders active. Overrode D50 injection for administration. Awaiting call back/further orders. Will continue to monitor pt status.

## 2017-08-25 NOTE — PROGRESS NOTES
"  Brief Trauma Nursing Note:   Provided the following handouts to the patient:  Osteoporosis: For Men  \"Preventing Falls at Home: Simple Steps to Keep You Safe\"    Attempted to stop and provide education. Pt sleeping. Copy of the handouts left at the bedside.        "

## 2017-08-25 NOTE — PLAN OF CARE
Problem: Goal Outcome Summary  Goal: Goal Outcome Summary  Vitals:     08/25/17 0617 08/25/17 0817 08/25/17 1214 08/25/17 1607   BP: 105/54 133/58 125/61 126/59   BP Location:     Left arm Left arm   Pulse:           Resp: 16 16 16 16   Temp: 97.3  F (36.3  C) 97.9  F (36.6  C) 96.9  F (36.1  C) 97.4  F (36.3  C)   TempSrc: Oral Oral Oral Oral   SpO2: 96% 98% 97% 97%   Weight:           Height:           afebrile vital stable, sating intact, pian is managed with dilaudid and tylenol,  Legs remain cool - CMS appears intact aside from pt baseline reports of  Numbness. Right leg wound dressing changed as ordered  Bruises, voiding using urinal, tolerating intake, bed rest, continue with plan of care.

## 2017-08-25 NOTE — ED PROVIDER NOTES
History     Chief Complaint   Patient presents with     Fall     HPI  Amos Walker is a 72 year old male with DM2 with neuropathy and PVD, CKD stage 3, HTN, senile nuclear sclerosis, who presents after fall today after tripping.    He [place], felt like he tripped over his left foot, did not lose consciousness or feel faint before.  No change in vision, no headache, no nausea or vomiting, no shaking or seizure activity in past or present.  He landed on left hip and left elbow.  Reports tried to stand with assistance of passerby, felt like left leg could not support his weight, was assisted to bench, ambulance called.  Denies any presyncopal symptoms, states he felt perfectly fine and just tripped over foot.  Glucose when EMS arrived was 88.    He reports he often feels 'clumsy', tripping over feet, sometimes feels as if difficult to lift off of ground.  Reports no lateralized weakness.  He states that he has tripped and fallen in past multiple times due to this, had fracture of left fingers due to this in past.  He does not feel as if his strength is decreased or that he trips more frequently now.    I have reviewed the Medications, Allergies, Past Medical and Surgical History, and Social History in the Epic system.    Review of Systems   Constitutional: Negative for appetite change, chills, fatigue and fever.   HENT: Positive for hearing loss (chronic, not worsened). Negative for rhinorrhea and sore throat.    Eyes: Negative for visual disturbance.   Respiratory: Negative for cough, shortness of breath and wheezing.    Cardiovascular: Negative for chest pain and leg swelling.   Gastrointestinal: Negative for abdominal pain, blood in stool, diarrhea, nausea and vomiting.   Genitourinary: Negative for decreased urine volume, dysuria, hematuria, penile swelling, scrotal swelling and testicular pain.   Musculoskeletal: Positive for gait problem. Negative for back pain, neck pain and neck  "stiffness.        Left hip pain, mild left elbow pain   Skin: Negative for rash and wound.   Neurological: Negative for dizziness, seizures, syncope, light-headedness and headaches.   Psychiatric/Behavioral: Negative for agitation, confusion and decreased concentration.   All other systems reviewed and are negative.      Physical Exam   BP: 174/75  Pulse: 103  Temp: 98  F (36.7  C)  Height: 188 cm (6' 2\")  Weight: 82.1 kg (181 lb)  SpO2: 97 %  Physical Exam   Constitutional: He is oriented to person, place, and time. He appears well-developed and well-nourished. No distress.   HENT:   Head: Normocephalic and atraumatic.   Right Ear: External ear normal.   Left Ear: External ear normal.   Mouth/Throat: No oropharyngeal exudate.   Hearing aids in place and working but still hard of hearing, at baseline he reports.   Eyes: EOM are normal. Pupils are equal, round, and reactive to light. Right eye exhibits no discharge. Left eye exhibits no discharge.   Neck: Normal range of motion. Neck supple. No tracheal deviation present.   Cardiovascular: Normal rate, regular rhythm and intact distal pulses.    No murmur heard.  Pulmonary/Chest: Effort normal and breath sounds normal. No respiratory distress. He has no wheezes. He has no rales. He exhibits no tenderness.   Abdominal: Soft. He exhibits no distension. There is no tenderness. There is no rebound and no guarding.   Musculoskeletal: He exhibits no edema.        Left elbow: He exhibits normal range of motion and no deformity. No tenderness found.        Left hip: He exhibits decreased range of motion, decreased strength, tenderness and bony tenderness.        Left knee: He exhibits no effusion and no deformity. No tenderness found.        Left ankle: He exhibits normal range of motion. No tenderness. Achilles tendon exhibits no pain.        Legs:       Left foot: There is normal range of motion, no tenderness and no bony tenderness.   Left hip: Unable to assess ROM due to " intense pain with minimal movement.  He was unable to lift against gravity due to pain, he would keep leg externally rotated at hip, intense pain when he attempted to internally rotate at hip joint.  Left leg not visibly shorter than right.  Possible mild deformity at left hip/femoral junction but no overt displacement noted.     Left elbow with FROM, minimal tenderness on palpation, some abrasion with mild swelling over lateral elbow.     Neurological: He is alert and oriented to person, place, and time. No cranial nerve deficit.   Decreased sensation to light touch in bilateral feet.  Able to sense pressure for left foot, proprioception appeared altered but difficult to fully assess given patient's pain.   Skin: Skin is warm and dry. No rash noted. He is not diaphoretic.        Bilateral feet cold to touch, decreased cap refill, pulses faint but palpable.   Psychiatric: He has a normal mood and affect. His behavior is normal.       ED Course     ED Course     Left hip and pelvis xrays ordered based on exam, found to have left femoral neck fracture.  Orthopedics paged and spoken with at 12:24 AM, Trauma service paged and spoken with at 12:30 AM and accepted admission.  CXR and left elbow xray also ordered given nature of fall.  Type and screen and INR ordered, planned for transfer to trauma service once orthopedics evaluates.    Procedures             EKG Interpretation:      Interpreted by Jose Momin  Time reviewed: 10:15 PM  Symptoms at time of EKG: hip pain   Rhythm: normal sinus   Rate: normal  Axis: normal  Ectopy: none  Conduction: normal  ST Segments/ T Waves: No ST-T wave changes  Q Waves: none  Comparison to prior: No old EKG available    Clinical Impression: normal EKG        Critical Care time:    Trauma:  Level of trauma activation: Emergency Department evaluation, no trauma code called  C-collar and immobilization: not indicated, cleared.  CSpine Clearance: by Nexus Criteria  GCS at arrival:  15  GCS at disposition: unchanged     Labs Ordered and Resulted from Time of ED Arrival Up to the Time of Departure from the ED   CBC WITH PLATELETS DIFFERENTIAL - Abnormal; Notable for the following:        Result Value    RBC Count 3.59 (*)     Hemoglobin 11.5 (*)     Hematocrit 33.9 (*)     All other components within normal limits   GLUCOSE BY METER - Abnormal; Notable for the following:     Glucose 182 (*)     All other components within normal limits   BASIC METABOLIC PANEL   GLUCOSE MONITOR NURSING POCT            Assessments & Plan (with Medical Decision Making)   Amos Walker is a 72 year old male with DM2 with neuropathy and PVD, CKD stage 3, HTN, senile nuclear sclerosis, who presents after fall today after tripping.    ## Fall  Denies any presyncopal or syncopal symptoms.  Reports tripping over feet as usual mechanical issue secondary to his significant PVD and neuropathy.  He denies any focal weakness or stroke symptoms in past or currently/prior to fall.  Normal ECG, no cardiac symptoms.  - Likely mechanical fall due to his PVD and neuropathy, could be interval worsening  - Unable to do full neuro exam due to pain, concern for possible foot drop given reported history  - May warrant further neuro examination, follow up with vascular and neurology services as inpatient or outpatient    ## Left hip pain  Concern for fracture given pain with minimal movement, age, mechanism of injury, and exam.  - Left hip xray and pelvic xray  - Showed fracture of Left femoral neck with mild foreshortening and anterior displacement of greater trochanter  - Orthopedics and trauma services paged and spoken with, will be admitted to trauma service with ortho to see and evaluate  - Offered multiple pain options, patient refused all initially until he found out what was wrong  - Later accepted 1g of Tylenol  - Discussed nerve block and opioids, he declined at this time    ## Left elbow pain  Lower suspicion for fracture  given exam and minimal pain  - Given fracture of hip, will do CXR and Left elbow xray    Dispo:  Admit to trauma service, ortho contacted as above.    I have reviewed the nursing notes.    I have reviewed the findings, diagnosis, plan and need for follow up with the patient.    New Prescriptions    No medications on file       Final diagnoses:   None       8/24/2017   [facility], [place], EMERGENCY DEPARTMENT

## 2017-08-25 NOTE — PHARMACY-ADMISSION MEDICATION HISTORY
Admission medication history interview status for the 8/24/2017 admission is complete. See Epic admission navigator for allergy information, pharmacy, prior to admission medications and immunization status.     Medication history interview sources:  patient interview, [employer] Benefits    Changes made to PTA medication list (reason)  Added: None  Deleted: ammonium lactate (duplicate), Ciprodex eye drops (patient stopped taking), clindamycin (therapy completed), econazole cream (patient stopped taking - script from 2015), gentamicin ointment (duplicate, only does once daily), ketoconazole (therapy completed), levofloxacin (duplicate, therapy completed), Bactroban cream/ointment (therapy completed), nystatin powder (patient stopped taking), nystatin-triamcinolone cream (patient stopped taking), oxycodone (patient denied using), triamcinolone cream (patient stopped taking), SSD 1% cream (duplicate)  Changed: Vitamin C 1000 mg daily -> 500 mg BID, hydrochlorothiazide 12.5 mg daily -> every other day    Additional medication history information (including reliability of information, actions taken by pharmacist):  - Patient was a good historian. Was able to recall medications including dose and time he took each medication. He took all morning medications and took his insulin glargine before he left in the afternoon  - He uses insulin glargine 25 units daily at night and takes nateglinide three times daily with each meal  - Patient uses silver sulfadiazine and gentamicin 0.1% cream for wound care for the ulcer on his leg. Patient reports doing this every night  - He takes lisinopril 40 mg daily; however, he takes 1/2 tablet (20 mg) in the morning and 1/2 tablet (20 mg) at night  - Patient reports taking hydrochlorothiazide every other day   - Verified refill history using [employer] Benefits    Prior to Admission medications    Medication Sig Last Dose Taking? Auth Provider   HYDROCHLOROTHIAZIDE PO Take 12.5 mg by mouth  every other day 8/23/2017 at PM Yes Unknown, Entered By History   LISINOPRIL PO Take 20 mg by mouth 2 times daily 8/24/2017 at AM Yes Unknown, Entered By History   ascorbic acid 500 MG TABS Take 500 mg by mouth 2 times daily  Yes Unknown, Entered By History   gentamicin (GARAMYCIN) 0.1 % cream Apply topically daily To right leg ulcer. 8/23/2017 at PM Yes Brayan Mcclain DPM   silver sulfADIAZINE (SILVADENE) 1 % cream Apply topically daily for 7 days To affected areas on right foot and leg. 8/23/2017 at PM Yes Brayan Mcclain DPM   clopidogrel (PLAVIX) 75 MG tablet Take 1 tablet (75 mg) by mouth daily 8/23/2017 at PM Yes Silvina Patiño MD   nateglinide (STARLIX) 120 MG tablet TAKE 1 TABLET BY MOUTH THREE TIMES DAILY BEFORE MEALS 8/24/2017 at PM Yes Silvina Patiño MD   sitagliptin (JANUVIA) 100 MG tablet Take 1 tablet (100 mg) by mouth daily 8/24/2017 at AM Yes Silvina Patiño MD   ammonium lactate (LAC-HYDRIN) 12 % cream Apply topically 2 times daily as needed for dry skin  at PRN Yes Brayan Mcclain DPM   hydrocortisone (WESTCORT) 0.2 % cream Apply sparingly to affected area twice times daily for 14 days.  at PRN Yes Silvina Patiño MD   simvastatin (ZOCOR) 10 MG tablet Take 1 tablet (10 mg) by mouth At Bedtime 8/23/2017 at PM Yes Silvina Patiño MD   insulin glargine (LANTUS SOLOSTAR) 100 UNIT/ML PEN Inject 25 Units Subcutaneous At Bedtime 8/24/2017 at PM Yes Silvina Patiño MD   sildenafil (VIAGRA) 50 MG tablet Take 1 tablet (50 mg) by mouth daily as needed for erectile dysfunction  at PRN Yes Silvina Patiño MD   ferrous sulfate (IRON) 325 (65 FE) MG tablet Take 1 tablet (325 mg) by mouth 2 times daily 8/24/2017 at AM Yes Silvina Patiño MD   aspirin 81 MG chewable tablet Take 81 mg by mouth daily 8/24/2017 at AM Yes Reported, Patient   Cholecalciferol (VITAMIN D3 PO) Take 1,000 Units by mouth daily  8/23/2017 at PM Yes Reported, Patient   insulin pen needle (B-D U/F) 31G X  "8 MM Use 1 daily o as directed   Silvina Patiño MD   order for DME Please measure and distribute 1 pair of 30mmHg - 40mmHg knee high open toe ulcercare compression stockings. Jobst ultrasheer or equivalent.   Olga Burgos APRN CNP   blood glucose monitoring (ONE TOUCH ULTRA) test strip Use to test blood sugars 2 times daily or as directed.   Silvina Patiño MD   blood glucose monitoring (FREESTYLE) lancets Use to test blood sugars 2 as directed.   Silvina Patiño MD   order for DME Please measure and distribute 1 pair of 20mmHg - 30mmHg knee high open or closed toe compression stockings. Jobst ultrasheer or equivalent.   Lane Jenkins MD   order for DME Please measure and distribute 1 pair of 20mm Hg - 30mm Hg knee high ULCER CARE open or closed toe compression stockings.   Patient has a size 13 foot and please take this into consideration.   Jobst or equivalent   Silvina Patiño MD   Gauze Pads & Dressings (OPTIFOAM) 6\"X6\" PADS 1 Box once a week   Raman Villanueva MD       Medication history completed by: Vickie Yun, PharmD Student      "

## 2017-08-25 NOTE — PLAN OF CARE
Problem: Individualization  Goal: Patient Preferences  Outcome: No Change  RN - Vitals stable. Pt c/o pain with left hip - relieved with ordered tylenol and PRN IV dilaudid.  Planned surgery delayed to weekend. Pt diet advanced to regular - pt tolerating. Voiding adequately. Legs remain cool - CMS appears intact aside from pt baseline reports of  Numbness. Right leg wound dressing changed as ordered. Pt blood glucose requiring D50 injection x1 - otherwise WNL. Pt voicing frustration with circumstances leading to hospitalization.

## 2017-08-25 NOTE — PROGRESS NOTES
Reviewed case with multiple orthopedic staff.  As patient indep of ADLs, healthy with exception of DM, ambulates unassisted, and able to walk 20-40mins/1mile per day, we feel he is most indicated for total hip arthroplasty as opposed to hemiarthroplasty. AGUEDA will provide better pain relief and increased durability/lifespan of implant as opposed to hemiarthroplasty.  Discussed with patient that total hip arthroplasty requires more specialized staff than available today.  Explained that if he would like surgery today, we are ready and happy to proceed with hemiarthroplasty as he is now medically clear.  However, given improved durability/longer lifespan and improved pain relief of AGUEDA vs malik, orthopedics is recommending for AGUEDA in this patient, which we could not perform until this coming Sunday/Monday(staff availability for Sunday still being worked out).  Extensively discussed risks and benefits of each procedure, and both the patient and his wife, Danielle Walker(), would like to defer hemiarthroplasty today and proceed with total hip arthroplasty on this coming Sunday/monday.  Therefore, dc NPO, adat, one dose of lovenox 40SQ today and again tomorrow, hold anticoagulation and NPO Saturday night for possible OR Sunday.      dw Dr Miller, Dr Jackman and Dr Keller who agree    Craig Funes MD MS  Orthopedic Surgery, PGY4  [phone]

## 2017-08-25 NOTE — PROGRESS NOTES
[facility], [place]  Trauma Service Progress Note    Date of Service (when I saw the patient): 08/25/2017     Assessment & Plan   Trauma mechanism:Fall                   Time/date of injury: 8/25/2017, around 6pm                 Known Injuries:  1. Left femoral neck fracture  2. Left elbow bruising     Other diagnosis   1. Hypertension  2. Hyperlipidemia   3. PAD s/p right leg stenting--on Plavix and ASA   4. Chronic lower extremity ulcers  5. Diabetes Mellitus   6. Chronic kidney disease     Procedure: pending      Plan:  1. Limited examination today--no additional sources of pain,left elbow with some ecchymosis--monitor closely for occult fracture, initial imaging concerning given swelling.   2. Appreciate cares and thoughtful recommendations of internal medicine team. Lantus at 15 units at bedtime, sliding scale insulin correction with meals. Continue to hold oral anti glycemic medications. Will continue to hold diuretics and antihypertensive agents   3. Appreciate Orthopedics consult, after thorough discussion with patient,  plan for total hip replacement. Tentative timing either Sunday or Monday.  For now with continue with NWB to LLE. Lovenox x2 doses. Careful monitoring of L elbow.  Continue to hold ASA and Plavix.   4. Will allow diet. D/c IVF as he is now able to take PO.   5. WOC consult for continued cares of RLE ulcerations. Per patient, he typically does daily wound cares with Gentamycin, topical silvadene and cover with dressing.   6. Pain control: oral and IV medications. Ice as needed for swelling of left hip   7. Home medications: reviewed and appropriate medications resumed.   8. Physical and occupational therapy consults when able   9. Social work consult for discharge planning      Code status: Full Code     General Cares:  GI Prophylaxis: none  DVT Prophylaxis: SCDs.Lovenox x2 doses   Date of last stool/Bowel Regimen:8/24/2017  Pulmonary toilet:IS    Interval History   Course reviewed. No acute events since admission. Reports pain in left hip, left elbow sore. Denies fever, chills or sweats. Voiding well without issue. Baseline neuropathy in legs from DM, dicussed wound cares.  Plan for total hip replacement with orthopedics sometime this weekend.     Physical Exam   Temp: 96.9  F (36.1  C) Temp src: Oral BP: 125/61 Pulse: 103 Heart Rate: 87 Resp: 16 SpO2: 97 % O2 Device: None (Room air)    Vitals:    08/24/17 2120 08/25/17 0234   Weight: 82.1 kg (181 lb) 89.9 kg (198 lb 1.6 oz)     Vital Signs with Ranges  Temp:  [96.9  F (36.1  C)-99.1  F (37.3  C)] 96.9  F (36.1  C)  Pulse:  [103] 103  Heart Rate:  [] 87  Resp:  [8-25] 16  BP: (105-174)/() 125/61  SpO2:  [96 %-100 %] 97 %  I/O last 3 completed shifts:  In: 185.83 [I.V.:185.83]  Out: 800 [Urine:800]    Custer Coma Scale - Total 15/15    Constitutional: Awake, alert, cooperative, no apparent distress, laying in bed.   Eyes: Lids and lashes normal, pupils equal, round and reactive to light, extra ocular muscles intact, sclera clear, conjunctiva normal.  ENT: Normocephalic, atraumatic  Respiratory:  On room air, no increased work of breathing, good air exchange, clear to auscultation bilaterally, no crackles or wheezing.  Cardiovascular:  regular rate and rhythm, normal S1 and S2, no S3 or S4, and no murmur noted.  GI: Normal bowel sounds, soft, non-distended, non-tender, no guarding  Genitourinary:  Voiding per urinal, urine clear and yellow   Skin:  Right lower leg ulcers/wounds--dressed   Musculoskeletal:  Left thigh and hip tenderness, mild swelling, calves soft and non-tender. LUE ecchymosis. ZHANG.   Neurologic: Awake, alert, oriented x. Follows commands correctly and consistently. Cranial nerves II-XII are grossly intact.  Strength and sensory is intact.  No focal deficits. Baseline peripheral neuropathy.   Neuropsychiatric: Calm, normal eye contact, alert, affect appropriate to situation.     Emory Villegas,  RAHEEM  To contact the trauma service use job code pager [phone],   Numeric texts or alpha text through [facility]

## 2017-08-25 NOTE — ED NOTES
Pt fell after supper, felt as his sugar was low  Landed on left side  Was assisted into chair by [place]standers [place] the mall  Unable to bear weight to left leg  Pain to left groin/upper thigh  No loc or head injury, no neck pain  BG 88 per EMS  Arrives A&O x4  Ice applied to left medial upper leg
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
## 2017-08-25 NOTE — CONSULTS
Social Work: Assessment with Discharge Plan    Patient Name:  Amos Walker  :  1945  Age:  72 year old  MRN:  [id number]  Risk/Complexity Score:  Filed Complexity Screen Score: 6  Completed assessment with:  patient    Presenting Information   Reason for Referral:  Discharge plan and Potential need for community services upon discharge  Date of Intake:  2017  Referral Source:  Chart Review  Decision Maker:  Self  Alternate Decision Maker:  Next of kin is pt's spouse.  Pt states he would like his wife to make his decisions if he were unable to do so.  Health Care Directive:  Provided Copy  Living Situation:  House.  Pt lives with his wife Danielle and their 2 adult children, Raman and Stacia.  Previous Functional Status:  Independent.   Patient and family understanding of hospitalization:  Rehabilitative.    Cultural/Language/Spiritual Considerations:  Did not assess.  Adjustment to Illness:  Pt appears just a bit nervous; states he is not sure what to expect but otherwise appears to be coping well.    Physical Health  Reason for Admission:  No diagnosis found.  Services Needed/Recommended:  Other:  to be determined.    Mental Health/Chemical Dependency  Diagnosis:  Pt denies MI diagnosis or CD concern.  Long history of tobacco use per chart.  States he has a glass of wine socially once in a while; doesn't like beer.    Support/Services in Place:  None.  Services Needed/Recommended:  None.    Support System  Significant relationship at present time:  Wife, Danielle.  Pt reports he took Danielle to the ED on Monday (17) where she was diagnosed with Bell's Palsy.  He states she seems to be doing better.  Family of origin is available for support:  They have 2 adult children.  Pt states that Danielle may be able to assist with very light assistance and perhaps his children could do more heavy assist, but he has not discussed this with them yet.  Other support available:  Pt states he could perhaps call  a friend or additional family for other types of assist.  Gaps in support system:  None.  Patient is caregiver to:  None     Provider Information   Primary Care Physician:  Racheal Swift   [phone]   Clinic:  [address] [place] [address] 741 / [facility] [address]      :  unknown    Financial   Income Source:  He states both he and his wife are retired.  Financial Concerns:  He is wondering how much he will accrue in bills for this hospital stay.    Insurance:    Payor/Plan Subscriber Name Rel Member # Group #   MEDICARE - MEDICARE RASHEED SORIANO  [id number]       ATTN CLAIMS, [address]   BCBS - BCBS OUT OF * SHANICE SORIANO  [id number]      [address] [address]       Discharge Plan   Patient and family discharge goal:  Not sure yet.  Pt waiting to see what his d/c needs will be after surgery.  Provided education on discharge plan:  YES  Patient agreeable to discharge plan:  YES  A list of Medicare Certified Facilities was provided to the patient and/or family to encourage patient choice. Patient's choices for facility are:  No list given yet.  Pt seems open to idea of a TCU.  Writer educated pt re: TCU level of care and where they are located.  Will NH provide Skilled rehabilitation or complex medical:  YES  General information regarding anticipated insurance coverage and possible out of pocket cost was discussed. Patient and patient's family are aware patient may incur the cost of transportation to the facility, pending insurance payment: this will need to be discussed prior to d/c.  Barriers to discharge:  Plans for OR on Sunday or Monday; medical stability.    Discharge Recommendations   Anticipated Disposition:  to be determined.  Transportation Needs:  Other:  to be determined.  Name of Transportation Company and Phone:  NA    Additional comments   Pt asks that his wife be involved in d/c planning discussions after surgery.  SW also educated pt briefly about home care services too.  Pt seems  open to either idea: home care vs TCU, states he depends on what is needed.  SOM will continue to follow for d/c planning.      KELSEY Lua covering for   [phone] phone  [phone] pager
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
## 2017-08-25 NOTE — PROGRESS NOTES
Medicine Consult Update  Updated surgical plan noted. Now looking forward to AGUEDA on Sunday vs Monday. Diet to be started.     New recommendations based on plan for surgery Sun/Mon:  - diabetic diet  - can stop MIVF  - medium correctional insulin scale & glucose checks TIDAC and at 0200  - restart insulin glargine at reduced dose tonight (10 units - I made this adjustment based on the patient's daytime glucose levels today)  - continue to hold BP meds; labetalol prn SBP > 180  - continue to hold ASA/plavix, plan to restart post-op ASAP    We will continue to follow over the weekend.    Raman Grewal M.D.  Attending Physician  [facility]  Bedside Procedures Service  [phone]  Date of Service: August 25, 2017

## 2017-08-26 ENCOUNTER — ANESTHESIA EVENT (OUTPATIENT)
Dept: SURGERY | Facility: CLINIC | Age: 72
DRG: 470 | End: 2017-08-26
Payer: MEDICARE

## 2017-08-26 LAB
GLUCOSE BLDC GLUCOMTR-MCNC: 106 MG/DL (ref 70–99)
GLUCOSE BLDC GLUCOMTR-MCNC: 112 MG/DL (ref 70–99)
GLUCOSE BLDC GLUCOMTR-MCNC: 138 MG/DL (ref 70–99)
GLUCOSE BLDC GLUCOMTR-MCNC: 157 MG/DL (ref 70–99)
GLUCOSE BLDC GLUCOMTR-MCNC: 182 MG/DL (ref 70–99)
GLUCOSE BLDC GLUCOMTR-MCNC: 190 MG/DL (ref 70–99)
GLUCOSE BLDC GLUCOMTR-MCNC: 198 MG/DL (ref 70–99)
GLUCOSE BLDC GLUCOMTR-MCNC: 201 MG/DL (ref 70–99)

## 2017-08-26 PROCEDURE — 25000131 ZZH RX MED GY IP 250 OP 636 PS 637: Mod: GY | Performed by: PHYSICIAN ASSISTANT

## 2017-08-26 PROCEDURE — 25000132 ZZH RX MED GY IP 250 OP 250 PS 637: Mod: GY | Performed by: PHYSICIAN ASSISTANT

## 2017-08-26 PROCEDURE — 25000128 H RX IP 250 OP 636: Performed by: PHYSICIAN ASSISTANT

## 2017-08-26 PROCEDURE — A9270 NON-COVERED ITEM OR SERVICE: HCPCS | Mod: GY | Performed by: NURSE PRACTITIONER

## 2017-08-26 PROCEDURE — 00000146 ZZHCL STATISTIC GLUCOSE BY METER IP

## 2017-08-26 PROCEDURE — 86900 BLOOD TYPING SEROLOGIC ABO: CPT | Performed by: ORTHOPAEDIC SURGERY

## 2017-08-26 PROCEDURE — 86901 BLOOD TYPING SEROLOGIC RH(D): CPT | Performed by: ORTHOPAEDIC SURGERY

## 2017-08-26 PROCEDURE — 99232 SBSQ HOSP IP/OBS MODERATE 35: CPT | Performed by: HOSPITALIST

## 2017-08-26 PROCEDURE — 36415 COLL VENOUS BLD VENIPUNCTURE: CPT | Performed by: ORTHOPAEDIC SURGERY

## 2017-08-26 PROCEDURE — 25000132 ZZH RX MED GY IP 250 OP 250 PS 637: Mod: GY | Performed by: NURSE PRACTITIONER

## 2017-08-26 PROCEDURE — 25000128 H RX IP 250 OP 636: Performed by: FAMILY MEDICINE

## 2017-08-26 PROCEDURE — 12000008 ZZH R&B INTERMEDIATE UMMC

## 2017-08-26 PROCEDURE — A9270 NON-COVERED ITEM OR SERVICE: HCPCS | Mod: GY | Performed by: PHYSICIAN ASSISTANT

## 2017-08-26 PROCEDURE — 86850 RBC ANTIBODY SCREEN: CPT | Performed by: ORTHOPAEDIC SURGERY

## 2017-08-26 RX ORDER — HEPARIN SODIUM 5000 [USP'U]/.5ML
5000 INJECTION, SOLUTION INTRAVENOUS; SUBCUTANEOUS EVERY 8 HOURS
Status: DISCONTINUED | OUTPATIENT
Start: 2017-08-26 | End: 2017-08-26

## 2017-08-26 RX ORDER — METHOCARBAMOL 500 MG/1
500 TABLET, FILM COATED ORAL 4 TIMES DAILY
Status: DISCONTINUED | OUTPATIENT
Start: 2017-08-26 | End: 2017-08-29

## 2017-08-26 RX ADMIN — METHOCARBAMOL 500 MG: 500 TABLET ORAL at 19:29

## 2017-08-26 RX ADMIN — METHOCARBAMOL 500 MG: 500 TABLET ORAL at 10:57

## 2017-08-26 RX ADMIN — HYDROMORPHONE HYDROCHLORIDE 0.3 MG: 1 INJECTION, SOLUTION INTRAMUSCULAR; INTRAVENOUS; SUBCUTANEOUS at 08:31

## 2017-08-26 RX ADMIN — GENTAMICIN SULFATE: 1 CREAM TOPICAL at 09:05

## 2017-08-26 RX ADMIN — SILVER SULFADIAZINE: 10 CREAM TOPICAL at 09:09

## 2017-08-26 RX ADMIN — OXYCODONE HYDROCHLORIDE AND ACETAMINOPHEN 1000 MG: 500 TABLET ORAL at 08:36

## 2017-08-26 RX ADMIN — ACETAMINOPHEN 1000 MG: 500 TABLET, FILM COATED ORAL at 13:47

## 2017-08-26 RX ADMIN — ENOXAPARIN SODIUM 40 MG: 40 INJECTION SUBCUTANEOUS at 10:57

## 2017-08-26 RX ADMIN — METHOCARBAMOL 500 MG: 500 TABLET ORAL at 15:19

## 2017-08-26 RX ADMIN — FERROUS SULFATE TAB 325 MG (65 MG ELEMENTAL FE) 325 MG: 325 (65 FE) TAB at 08:36

## 2017-08-26 RX ADMIN — INSULIN ASPART 2 UNITS: 100 INJECTION, SOLUTION INTRAVENOUS; SUBCUTANEOUS at 15:19

## 2017-08-26 RX ADMIN — ACETAMINOPHEN 1000 MG: 500 TABLET, FILM COATED ORAL at 19:30

## 2017-08-26 RX ADMIN — ACETAMINOPHEN 1000 MG: 500 TABLET, FILM COATED ORAL at 08:36

## 2017-08-26 RX ADMIN — FERROUS SULFATE TAB 325 MG (65 MG ELEMENTAL FE) 325 MG: 325 (65 FE) TAB at 19:30

## 2017-08-26 RX ADMIN — POLYETHYLENE GLYCOL 3350 17 G: 17 POWDER, FOR SOLUTION ORAL at 08:54

## 2017-08-26 RX ADMIN — SIMVASTATIN 10 MG: 10 TABLET, FILM COATED ORAL at 22:23

## 2017-08-26 ASSESSMENT — LIFESTYLE VARIABLES: TOBACCO_USE: 1

## 2017-08-26 ASSESSMENT — ENCOUNTER SYMPTOMS
PSYCHIATRIC NEGATIVE: 1
WOUND: 1
BACK PAIN: 1
NUMBNESS: 0
NAUSEA: 0
SHORTNESS OF BREATH: 0
CHEST TIGHTNESS: 0
CONSTITUTIONAL NEGATIVE: 1
NECK PAIN: 1
EYES NEGATIVE: 1
HEADACHES: 0
ABDOMINAL PAIN: 0

## 2017-08-26 ASSESSMENT — PAIN DESCRIPTION - DESCRIPTORS: DESCRIPTORS: ACHING

## 2017-08-26 NOTE — PLAN OF CARE
Problem: Discharge Planning  Goal: Discharge Planning (Adult, OB, Behavioral, Peds)  Outcome: No Change  D AVSS with sat's 95% on room air. Heart regular and lungs reduced in bases otherwise clear. Has voiced no c/o pain or nausea to this nurse and has slept fair this night. Good urine output -voiding into urine at bed side. CMS poor in L/e's with some c/o numbness per baseline. Hoping to get surgery to fix his hip on Sunday. Blood glucose low 100's after holding Lantus on previous shift.   I Vital's, assessment and med's per order.   A Resting in bed with call light in reach.   P Continue to monitor and update MD with changes.

## 2017-08-26 NOTE — PROGRESS NOTES
"Orthopaedic Surgery Progress Note     Subjective/Events: No acute overnight events. Pain controlled. Tolerating PO, voiding. Denies numbness, tingling.    Objective:  /61 (BP Location: Left arm)  Pulse 88  Temp 99  F (37.2  C) (Oral)  Resp 16  Ht 1.88 m (6' 2\")  Wt 89.9 kg (198 lb 1.6 oz)  SpO2 96%  BMI 25.43 kg/m2  Temp (24hrs), Av  F (36.7  C), Min:96.9  F (36.1  C), Max:99  F (37.2  C)    Gen: A&Ox3, no acute distress  CV: 2+ dp/pt pulses, capillary refill < 2sec  Resp: breathing equal and non-labored, no wheezing  LLE: 5/5 TA GSC EHL FHL, Sensation decreased throughout foot, 2+ dp pulse    Assessment: 72M s/p fall with left femoral neck fracture  Plan:  -Weight Bearing Status: NWB LLE  -Bedrest  -VTE prophylaxis: lovenox, hold tomorrow for OR  -NPO at midnight      Plan for OR tomorrow at 8 am for left total hip arthroplasty    Afsaneh Robles MD  PGY-4  [phone]  "

## 2017-08-26 NOTE — PROGRESS NOTES
[facility] Nurse Inpatient Adult WoundAssessment    Initial Assessment  Reason for consultation: Evaluate and treat right LE wounds     Assessment:   Right foot wound due to Neuropathic Ulcer   Right anterior shin wound with mixed etiology: diagnosis of DM with peripheral neuropathy, vascular disease and venous hypertension.        Photo:    8/25: right lateral foot       8/25: right shin     TREATMENT  PLAN    Right leg: (shin and foot wounds)  1. Spray with microklenz spray   2. Apply gentamicin to shin wound bed  3. Apply Silvadene around wound edges and to foot wound and any cracked skin   4. Cover with Primapore dressings    Orders Updated  WO Nurse follow-up plan:weekly  Nursing to notify the Provider(s) and re-consult the [facility] Nurse if wound(s) deteriorates or new skin concern.    Patient History  According to provider note(s):  72 year old male with DM2 with neuropathy and PVD, CKD stage 3, HTN, senile nuclear sclerosis, admitted for Left femoral neck fracture sustained after fall    Objective Data     Current Diet/ Nutrition:    Active Diet Order      NPO per Anesthesia Guidelines for Procedure/Surgery Except for: Meds      High Consistent CHO Diet    Output:   I/O last 3 completed shifts:  In: 585.83 [P.O.:400; I.V.:185.83]  Out: 1865 [Urine:1865]    Skin Assessment: Yoel Yoel Score  Avg: 15  Min: 15  Max: 15                                Yoel Q No Data Recorded                    Labs:   Recent Labs  Lab 08/25/17  0041 08/24/17  2131   HGB  --  11.5*   INR 1.13  --    WBC  --  8.0         No lab results found in last 7 days.    Physical Exam    Skin assessment:   Focused skin inspection: RLE  LE appropriately warm, no edema.  Cap refill 2-3 seconds in toes.  PP palpable, faint.  No hemosiderin staining noted.   Wound Location:  Right inferior shin   Wound History: wounds followed in clinic by Podiatry:  Dr. Mcclain.  Current treatment per Dr. Mcclain's notes is cleaning with hibiclens, gentamicin cream  to wound base and covered with dry gauze.  Silvadene to cracked areas by 5th metatarsal   Measurements (length x width x depth, in cm) 7.7 cm x 2.5 cm  x 0.5 cm   Wound Base:  50% granulation in superior and inferior wound, 50% non granular pale pink subcutaneous tissue in center   Tunneling N/A  Undermining N/A  Palpation of the wound bed: normal   Periwound skin: intact with hyperkeratotic tissue.  No maceration or erythema. New epithelium  Color: normal and consistent with surrounding tissue  Temperature: normal   Drainage:, small  Description of drainage: serosanguinous  Odor: mild  Pain: minimal      Wound Location: right lateral foot with deformity  Wound History: wound followed in clinic by Podiatry  Measurements (length x width x depth, in cm) approximately 1.5 cm diameter thick callus with peeling edges and open wound in center that measures 0.4 x 0.4 x 0.5 cm depth.    Wound Base:  100% deep red, dry tissue   Tunneling N/A  Undermining N/A  Palpation of the wound bed: normal   Periwound skin: intact  Color: normal and consistent with surrounding tissue  Temperature: normal   Drainage:, none  Odor: none  Pain: absent    Interventions  Current support surface: Standard  Atmos Air    Current off-loading measures: Pillows under calves  Visual inspection of wound(s) completed  Wound Care:  Wounds cleansed with microklenz spray and redressed with telfa dressing.   Supplies: Reviewed    Discussed plan of care with Patient and RN staff   Face to face time: 25 minutes

## 2017-08-26 NOTE — PLAN OF CARE
"Problem: Fall Risk (Adult)  Goal: Identify Related Risk Factors and Signs and Symptoms  Related risk factors and signs and symptoms are identified upon initiation of Human Response Clinical Practice Guideline (CPG)   Outcome: No Change  ./64 (BP Location: Left arm)  Pulse 93  Temp 98.5  F (36.9  C) (Oral)  Resp 16  Ht 1.88 m (6' 2\")  Wt 89.9 kg (198 lb 1.6 oz)  SpO2 96%  BMI 25.43 kg/m2      Patient received tylenol and IV pain meds with better pain control; reports numbness in left 4th and 5th toes;  and no novolog given, declined to take his Lantus (would like to be checked overnight); [place] and wears hearing aids; surgery planned for Sunday or Monday      "

## 2017-08-26 NOTE — PLAN OF CARE
Problem: Goal Outcome Summary  Goal: Goal Outcome Summary  Outcome: No Change  Vitals:     08/26/17 0400 08/26/17 0756 08/26/17 1149 08/26/17 1605   BP:   136/64 120/62 119/55   BP Location:   Left arm       Pulse: 88 93 86 82   Resp: 16 16 16 16   Temp:   98.6  F (37  C) 98.2  F (36.8  C) 98.4  F (36.9  C)   TempSrc:   Oral Oral Oral   SpO2:   95% 97% 97%   Weight:           Height:             AVSS, pt A&O x 4, occasional complaints of pain, IV dilaudid and PO tylenol given x 1 with adequate relief. Pt able to shift in bed independently. Had two large BMs via bedpan, voiding adequate via urinal. Appetite good. Plan for surgery tomorrow. Continuing to monitor per POC.

## 2017-08-26 NOTE — PROGRESS NOTES
[facility], [place]  Internal Medicine Progress Note - Gold Service      Assessment & Plan   Amos Walker is a 72 year old male  admitted on 8/24/2017. He has a history of PAD s/p RLE stent, IDDM2 c/b peripheral neuropathy, HTN, and long-term tobacco use, who was admitted after a mechanical fall and found to have a left femoral neck fracture.     Today's Recommendations  1) Hold lantus insulin pre-op  2) Continue correctional insulin scale   3) Hold lisinopril/HCTZ  4) Hold aspirin/plavix for now    Left femoral neck fracture, displaced  Management per Ortho and Trauma services. NWB LLE, bedrest.   - plan for OR tomorrow at 8 AM for left AGUEDA  - NPO at [facility] today, hold tomorrow      Ese-operative risk assessment   Mr. Walker is at moderate risk of major adverse cardiac events (MACE) for a moderate risk surgery. The Villatoro calculator estimates his risk of MACE at 1.1%. Functional capacity prior to admission was excellent (able to walk > 1 mile without symptoms).   - proceed to surgery without additional cardiovascular work-up   - hold aspirin/plavix for now; restart ASAP post-op  - hold home BP meds for now (lisinopril/HCTZ)  - hold home insulin for now     Peripheral arterial disease s/p RLE stent placement (2012)  Patient is on aspirin/plavix as outpatient. Okay to hold prior to surgery, but would restart as soon as it is safe from surgical perspective.      Diabetes mellitus, type 2 - insulin dependent  On insulin glargine at home. Recent A1c < 7. Blood sugars have been low/normal without any insulin.   - hold lantus insulin  - hold home sitagliptin, nateglinide  - continue medium correctional scale     HTN   - hold home BP meds (HCTZ, lisinopril)  - can give labetalol 10 mg IV q4h prn for SBP > 180     Chronic LE ulceration  - agree with WOC consult      RYAN - resolved  Creatinine elevated on admission, likely prerenal. Now resolved with IVF.      DVT Prophylaxis: Defer to  primary service. Lovenox today, hold tomorrow pre-op.     Diet: NPO per Anesthesia Guidelines for Procedure/Surgery Except for: Meds  High Consistent CHO Diet  Fluids: None.   DVT Prophylaxis: Enoxaparin (Lovenox) SQ  Code Status: Full Code    Disposition Plan   Expected discharge: 2 - 3 days; recommended to prior living arrangement once patient has surgery, is taking PO and his hemodynamically stable with good pain control and improved mobility post-op.     Entered: Raman Grewal 08/26/2017, 8:17 AM   Information in the above section will display in the discharge planner report.      The patient's care was discussed with the Bedside Nurse and Patient.    Raman Grewal  Internal Medicine Staff Hospitalist Service  [facility]  Pager: [phone]  Please see sticky note for cross cover information    Interval History   Pain well controlled at rest; worse with movement.   Declined lantus insulin last night. Modest oral intake.   Headache overnight that he attributed to stress; it has resolved.   A 4 point ROS was negative other than what is listed above.     Data reviewed today: I reviewed all medications, new labs and imaging results over the last 24 hours. I personally reviewed no images or EKG's today.    Physical Exam   Vital Signs: Temp: 98.6  F (37  C) Temp src: Oral BP: 136/64 Pulse: 93 Heart Rate: 84 Resp: 16 SpO2: 95 % O2 Device: None (Room air)    Weight: 198 lbs 1.6 oz     General Appearance: Lying comfortably in bed. NAD.   Eyes: No icterus or injection.  HEENT: Hard of hearing, improved with hearing aids.   Respiratory: Nonlabored breathing. Lungs clear bilat.   Cardiovascular: RRR. Normal s1/s2. No extra heart sounds.  GI: Soft. Nontender/nondistended.   Skin: No obvious rashes. Chronic stasis changes in LE.   Neurologic: Alert. Normal speech. Responds appropriately to all questions. [place], uses hearing aids.   Psychiatric: Appropriate mood.

## 2017-08-26 NOTE — PROGRESS NOTES
[facility], [place]    Trauma Service Tertiary Survey     Date of Service: 08/26/2017    Trauma mechanism:Fall                   Time/date of injury: 8/25/2017, around 6pm                 Known Injuries:  1. Left femoral neck fracture  2. Left elbow bruising      Other diagnosis   1. Acute pain  2. Hypertension  3. Hyperlipidemia   4. PAD s/p right leg stenting--on Plavix and ASA   5. Chronic lower extremity ulcers with MRSA  6. Diabetes Mellitus   7. Chronic kidney disease, stage 3  8. Tobacco dependence     Procedure: pending       Plan:  1. Tertiary survey done, no new injuries identified. left elbow with some ecchymosis, imaging negative for fx occult fx not ruled out.    2. Appreciate cares and thoughtful recommendations of internal medicine team. Lantus held.  sliding scale insulin correction with meals. Continue to hold oral anti glycemic medications. Will continue to hold diuretics and antihypertensive agents   3. Appreciate Orthopedics consult, plan for total hip replacement. Sunday. For now with continue with NWB to LLE. Lovenox x2 doses.  Continue to hold ASA and Plavix.   4. LISA  5. WOC consult for continued cares of RLE ulcerations. Per patient, he typically does daily wound cares with Gentamycin, topical silvadene and cover with dressing.   6. Pain control: oral and IV medications. Ice as needed for swelling of left hip.  Add antispasmotics for preop pain control  7. Home medications: reviewed and appropriate medications resumed.   8. Physical and occupational therapy consults when able   9. Social work consult for discharge planning      Code status: Full Code      General Cares:  GI Prophylaxis: none  DVT Prophylaxis: SCDs.Lovenox x2 doses   Date of last stool/Bowel Regimen:8/24/2017  Pulmonary toilet:    Code status: Full  Disposition: 7B, hospital dispo TBD    ETOH: done in H&P    SUBJECTIVE:  Review of Systems   Constitutional: Negative.    HENT: Positive for dental  "problem.         Ak Chin   Eyes: Negative.    Respiratory: Negative for chest tightness and shortness of breath.    Cardiovascular: Negative for chest pain.   Gastrointestinal: Negative for abdominal pain and nausea.   Musculoskeletal: Positive for back pain and neck pain (last night resolved this am).   Skin: Positive for wound.   Neurological: Negative for numbness and headaches.   Psychiatric/Behavioral: Negative.        OBJECTIVE:  Blood pressure 136/64, pulse 93, temperature 98.6  F (37  C), temperature source Oral, resp. rate 16, height 1.88 m (6' 2\"), weight 89.9 kg (198 lb 1.6 oz), SpO2 95 %.  Physical Exam   Constitutional: Vital signs are normal. He appears well-developed and well-nourished. He is active.   HENT:   Head: Normocephalic and atraumatic.   Nose: No sinus tenderness.   Eyes: Conjunctivae, EOM and lids are normal. Pupils are equal, round, and reactive to light.   Neck: Trachea normal and full passive range of motion without pain. No spinous process tenderness present.   Cardiovascular: Normal rate, regular rhythm, S1 normal, S2 normal and normal pulses.    No murmur heard.  Pulmonary/Chest: Effort normal and breath sounds normal. He has no decreased breath sounds. He has no wheezes. He has no rhonchi. He has no rales. He exhibits no bony tenderness and no deformity.   Abdominal: Soft. Normal appearance and bowel sounds are normal. There is no tenderness.   Genitourinary:   Genitourinary Comments: No urine to assess   Musculoskeletal:   All major joints palpated without increased tenderness, no notable decreased range of motion.  RIght hip exam deferred.   Neurological: He is alert. No cranial nerve deficit. GCS eye subscore is 4. GCS verbal subscore is 5. GCS motor subscore is 6.   Skin: Skin is warm and dry. Ecchymosis (small amount right groin) noted.        Psychiatric: He has a normal mood and affect. His speech is normal and behavior is normal. Thought content normal. Cognition and memory are " normal.   [facility]        ROUTINE LABS: (Last four results)  CMP  Recent Labs  Lab 08/25/17  0859 08/24/17  2131    136   POTASSIUM 3.9 4.2   CHLORIDE 107 106   CO2 23 23   ANIONGAP 6 8   GLC 95 148*   BUN 27 33*   CR 1.15 1.64*   GFRESTIMATED 63 42*   GFRESTBLACK 76 50*   CLAUDIA 7.8* 8.2*     CBC  Recent Labs  Lab 08/24/17  2131   WBC 8.0   RBC 3.59*   HGB 11.5*   HCT 33.9*   MCV 94   MCH 32.0   MCHC 33.9   RDW 13.2        INR  Recent Labs  Lab 08/25/17  0041   INR 1.13     Arterial Blood GasNo lab results found in last 7 days.    RADIOLOGY:      LEW Orona CNP

## 2017-08-27 ENCOUNTER — APPOINTMENT (OUTPATIENT)
Dept: GENERAL RADIOLOGY | Facility: CLINIC | Age: 72
DRG: 470 | End: 2017-08-27
Attending: ORTHOPAEDIC SURGERY
Payer: MEDICARE

## 2017-08-27 ENCOUNTER — APPOINTMENT (OUTPATIENT)
Dept: GENERAL RADIOLOGY | Facility: CLINIC | Age: 72
DRG: 470 | End: 2017-08-27
Attending: SURGERY
Payer: MEDICARE

## 2017-08-27 ENCOUNTER — ANESTHESIA (OUTPATIENT)
Dept: SURGERY | Facility: CLINIC | Age: 72
DRG: 470 | End: 2017-08-27
Payer: MEDICARE

## 2017-08-27 LAB
GLUCOSE BLDC GLUCOMTR-MCNC: 169 MG/DL (ref 70–99)
GLUCOSE BLDC GLUCOMTR-MCNC: 173 MG/DL (ref 70–99)
GLUCOSE BLDC GLUCOMTR-MCNC: 198 MG/DL (ref 70–99)
GLUCOSE BLDC GLUCOMTR-MCNC: 216 MG/DL (ref 70–99)
GLUCOSE BLDC GLUCOMTR-MCNC: 267 MG/DL (ref 70–99)

## 2017-08-27 PROCEDURE — C1713 ANCHOR/SCREW BN/BN,TIS/BN: HCPCS | Performed by: ORTHOPAEDIC SURGERY

## 2017-08-27 PROCEDURE — C9399 UNCLASSIFIED DRUGS OR BIOLOG: HCPCS

## 2017-08-27 PROCEDURE — 40000141 ZZH STATISTIC PERIPHERAL IV START W/O US GUIDANCE

## 2017-08-27 PROCEDURE — 37000009 ZZH ANESTHESIA TECHNICAL FEE, EACH ADDTL 15 MIN: Performed by: ORTHOPAEDIC SURGERY

## 2017-08-27 PROCEDURE — 25000128 H RX IP 250 OP 636

## 2017-08-27 PROCEDURE — 40000169 ZZH STATISTIC PRE-PROCEDURE ASSESSMENT I: Performed by: ORTHOPAEDIC SURGERY

## 2017-08-27 PROCEDURE — 00000146 ZZHCL STATISTIC GLUCOSE BY METER IP

## 2017-08-27 PROCEDURE — 0SRB029 REPLACEMENT OF LEFT HIP JOINT WITH METAL ON POLYETHYLENE SYNTHETIC SUBSTITUTE, CEMENTED, OPEN APPROACH: ICD-10-PCS | Performed by: ORTHOPAEDIC SURGERY

## 2017-08-27 PROCEDURE — 12000008 ZZH R&B INTERMEDIATE UMMC

## 2017-08-27 PROCEDURE — C1776 JOINT DEVICE (IMPLANTABLE): HCPCS | Performed by: ORTHOPAEDIC SURGERY

## 2017-08-27 PROCEDURE — 37000008 ZZH ANESTHESIA TECHNICAL FEE, 1ST 30 MIN: Performed by: ORTHOPAEDIC SURGERY

## 2017-08-27 PROCEDURE — 25000565 ZZH ISOFLURANE, EA 15 MIN: Performed by: ORTHOPAEDIC SURGERY

## 2017-08-27 PROCEDURE — P9041 ALBUMIN (HUMAN),5%, 50ML: HCPCS

## 2017-08-27 PROCEDURE — 27810169 ZZH OR IMPLANT GENERAL: Performed by: ORTHOPAEDIC SURGERY

## 2017-08-27 PROCEDURE — 25000132 ZZH RX MED GY IP 250 OP 250 PS 637: Mod: GY | Performed by: PHYSICIAN ASSISTANT

## 2017-08-27 PROCEDURE — 36000062 ZZH SURGERY LEVEL 4 1ST 30 MIN - UMMC: Performed by: ORTHOPAEDIC SURGERY

## 2017-08-27 PROCEDURE — 25000132 ZZH RX MED GY IP 250 OP 250 PS 637: Mod: GY | Performed by: NURSE PRACTITIONER

## 2017-08-27 PROCEDURE — 25000125 ZZHC RX 250

## 2017-08-27 PROCEDURE — 25000128 H RX IP 250 OP 636: Performed by: ANESTHESIOLOGY

## 2017-08-27 PROCEDURE — 0QH704Z INSERTION OF INTERNAL FIXATION DEVICE INTO LEFT UPPER FEMUR, OPEN APPROACH: ICD-10-PCS | Performed by: ORTHOPAEDIC SURGERY

## 2017-08-27 PROCEDURE — 72170 X-RAY EXAM OF PELVIS: CPT

## 2017-08-27 PROCEDURE — 25000131 ZZH RX MED GY IP 250 OP 636 PS 637: Mod: GY | Performed by: NURSE PRACTITIONER

## 2017-08-27 PROCEDURE — 40000985 XR HIP PORT LT 1 VW

## 2017-08-27 PROCEDURE — 25000132 ZZH RX MED GY IP 250 OP 250 PS 637: Mod: GY | Performed by: SURGERY

## 2017-08-27 PROCEDURE — 25000128 H RX IP 250 OP 636: Performed by: ORTHOPAEDIC SURGERY

## 2017-08-27 PROCEDURE — 25000128 H RX IP 250 OP 636: Performed by: STUDENT IN AN ORGANIZED HEALTH CARE EDUCATION/TRAINING PROGRAM

## 2017-08-27 PROCEDURE — A9270 NON-COVERED ITEM OR SERVICE: HCPCS | Mod: GY | Performed by: NURSE PRACTITIONER

## 2017-08-27 PROCEDURE — 71000014 ZZH RECOVERY PHASE 1 LEVEL 2 FIRST HR: Performed by: ORTHOPAEDIC SURGERY

## 2017-08-27 PROCEDURE — 27210794 ZZH OR GENERAL SUPPLY STERILE: Performed by: ORTHOPAEDIC SURGERY

## 2017-08-27 PROCEDURE — A9270 NON-COVERED ITEM OR SERVICE: HCPCS | Mod: GY | Performed by: PHYSICIAN ASSISTANT

## 2017-08-27 PROCEDURE — 25000128 H RX IP 250 OP 636: Performed by: SURGERY

## 2017-08-27 PROCEDURE — 25000125 ZZHC RX 250: Performed by: SURGERY

## 2017-08-27 PROCEDURE — 27211024 ZZHC OR SUPPLY OTHER OPNP: Performed by: ORTHOPAEDIC SURGERY

## 2017-08-27 PROCEDURE — 25800025 ZZH RX 258: Performed by: ORTHOPAEDIC SURGERY

## 2017-08-27 PROCEDURE — 36000064 ZZH SURGERY LEVEL 4 EA 15 ADDTL MIN - UMMC: Performed by: ORTHOPAEDIC SURGERY

## 2017-08-27 PROCEDURE — A9270 NON-COVERED ITEM OR SERVICE: HCPCS | Mod: GY | Performed by: SURGERY

## 2017-08-27 PROCEDURE — 25000128 H RX IP 250 OP 636: Performed by: FAMILY MEDICINE

## 2017-08-27 DEVICE — IMP SCR BIOM G7 ACETABULAR DOME 6.5X35MM LOW PRO 010001000: Type: IMPLANTABLE DEVICE | Site: HIP | Status: FUNCTIONAL

## 2017-08-27 DEVICE — IMP LINER BIOM G7 ACET NEU ARCOMXL CRSLNK 36MM SZ G 10000742: Type: IMPLANTABLE DEVICE | Site: HIP | Status: FUNCTIONAL

## 2017-08-27 DEVICE — IMPLANTABLE DEVICE: Type: IMPLANTABLE DEVICE | Site: HIP | Status: FUNCTIONAL

## 2017-08-27 DEVICE — BONE CEMENT RESTRICTOR BUCK FEMORAL 18.5MM 129418: Type: IMPLANTABLE DEVICE | Site: HIP | Status: FUNCTIONAL

## 2017-08-27 DEVICE — IMP SHELL BIOM G7 ACETAB PPS LIM HOLE 58MM SZ G 010000666: Type: IMPLANTABLE DEVICE | Site: HIP | Status: FUNCTIONAL

## 2017-08-27 DEVICE — BONE CEMENT SIMPLEX FULL DOSE 6191-1-001: Type: IMPLANTABLE DEVICE | Site: HIP | Status: FUNCTIONAL

## 2017-08-27 RX ORDER — SODIUM CHLORIDE, SODIUM LACTATE, POTASSIUM CHLORIDE, CALCIUM CHLORIDE 600; 310; 30; 20 MG/100ML; MG/100ML; MG/100ML; MG/100ML
INJECTION, SOLUTION INTRAVENOUS CONTINUOUS
Status: DISCONTINUED | OUTPATIENT
Start: 2017-08-27 | End: 2017-08-27 | Stop reason: HOSPADM

## 2017-08-27 RX ORDER — SODIUM CHLORIDE, SODIUM LACTATE, POTASSIUM CHLORIDE, CALCIUM CHLORIDE 600; 310; 30; 20 MG/100ML; MG/100ML; MG/100ML; MG/100ML
INJECTION, SOLUTION INTRAVENOUS CONTINUOUS PRN
Status: DISCONTINUED | OUTPATIENT
Start: 2017-08-27 | End: 2017-08-27

## 2017-08-27 RX ORDER — LABETALOL HYDROCHLORIDE 5 MG/ML
10 INJECTION, SOLUTION INTRAVENOUS
Status: DISCONTINUED | OUTPATIENT
Start: 2017-08-27 | End: 2017-08-27 | Stop reason: HOSPADM

## 2017-08-27 RX ORDER — FENTANYL CITRATE 50 UG/ML
25-50 INJECTION, SOLUTION INTRAMUSCULAR; INTRAVENOUS
Status: DISCONTINUED | OUTPATIENT
Start: 2017-08-27 | End: 2017-08-27 | Stop reason: HOSPADM

## 2017-08-27 RX ORDER — FENTANYL CITRATE 50 UG/ML
INJECTION, SOLUTION INTRAMUSCULAR; INTRAVENOUS PRN
Status: DISCONTINUED | OUTPATIENT
Start: 2017-08-27 | End: 2017-08-27

## 2017-08-27 RX ORDER — ONDANSETRON 2 MG/ML
INJECTION INTRAMUSCULAR; INTRAVENOUS PRN
Status: DISCONTINUED | OUTPATIENT
Start: 2017-08-27 | End: 2017-08-27

## 2017-08-27 RX ORDER — CEFAZOLIN SODIUM 1 G/3ML
1 INJECTION, POWDER, FOR SOLUTION INTRAMUSCULAR; INTRAVENOUS EVERY 8 HOURS
Status: COMPLETED | OUTPATIENT
Start: 2017-08-27 | End: 2017-08-27

## 2017-08-27 RX ORDER — WARFARIN SODIUM 5 MG/1
5 TABLET ORAL
Status: COMPLETED | OUTPATIENT
Start: 2017-08-27 | End: 2017-08-27

## 2017-08-27 RX ORDER — ONDANSETRON 2 MG/ML
4 INJECTION INTRAMUSCULAR; INTRAVENOUS EVERY 30 MIN PRN
Status: DISCONTINUED | OUTPATIENT
Start: 2017-08-27 | End: 2017-08-27 | Stop reason: HOSPADM

## 2017-08-27 RX ORDER — CEFAZOLIN SODIUM 1 G/3ML
1 INJECTION, POWDER, FOR SOLUTION INTRAMUSCULAR; INTRAVENOUS SEE ADMIN INSTRUCTIONS
Status: DISCONTINUED | OUTPATIENT
Start: 2017-08-27 | End: 2017-08-27 | Stop reason: HOSPADM

## 2017-08-27 RX ORDER — CEFAZOLIN SODIUM 2 G/100ML
2 INJECTION, SOLUTION INTRAVENOUS
Status: DISCONTINUED | OUTPATIENT
Start: 2017-08-27 | End: 2017-08-27 | Stop reason: HOSPADM

## 2017-08-27 RX ORDER — PROPOFOL 10 MG/ML
INJECTION, EMULSION INTRAVENOUS PRN
Status: DISCONTINUED | OUTPATIENT
Start: 2017-08-27 | End: 2017-08-27

## 2017-08-27 RX ORDER — OXYCODONE HYDROCHLORIDE 5 MG/1
5-10 TABLET ORAL EVERY 4 HOURS PRN
Status: DISCONTINUED | OUTPATIENT
Start: 2017-08-27 | End: 2017-08-29

## 2017-08-27 RX ORDER — LIDOCAINE HYDROCHLORIDE 20 MG/ML
INJECTION, SOLUTION INFILTRATION; PERINEURAL PRN
Status: DISCONTINUED | OUTPATIENT
Start: 2017-08-27 | End: 2017-08-27

## 2017-08-27 RX ORDER — ALBUMIN HUMAN 5 %
INTRAVENOUS SOLUTION INTRAVENOUS PRN
Status: DISCONTINUED | OUTPATIENT
Start: 2017-08-27 | End: 2017-08-27

## 2017-08-27 RX ORDER — ONDANSETRON 4 MG/1
4 TABLET, ORALLY DISINTEGRATING ORAL EVERY 30 MIN PRN
Status: DISCONTINUED | OUTPATIENT
Start: 2017-08-27 | End: 2017-08-27 | Stop reason: HOSPADM

## 2017-08-27 RX ADMIN — PHENYLEPHRINE HYDROCHLORIDE 100 MCG: 10 INJECTION, SOLUTION INTRAMUSCULAR; INTRAVENOUS; SUBCUTANEOUS at 11:09

## 2017-08-27 RX ADMIN — TRANEXAMIC ACID 1 G: 100 INJECTION, SOLUTION INTRAVENOUS at 10:11

## 2017-08-27 RX ADMIN — OXYCODONE HYDROCHLORIDE 5 MG: 5 TABLET ORAL at 23:32

## 2017-08-27 RX ADMIN — METHOCARBAMOL 500 MG: 500 TABLET ORAL at 19:41

## 2017-08-27 RX ADMIN — SODIUM CHLORIDE, POTASSIUM CHLORIDE, SODIUM LACTATE AND CALCIUM CHLORIDE: 600; 310; 30; 20 INJECTION, SOLUTION INTRAVENOUS at 09:00

## 2017-08-27 RX ADMIN — PHENYLEPHRINE HYDROCHLORIDE 100 MCG: 10 INJECTION, SOLUTION INTRAMUSCULAR; INTRAVENOUS; SUBCUTANEOUS at 09:14

## 2017-08-27 RX ADMIN — CEFAZOLIN 2 G: 1 INJECTION, POWDER, FOR SOLUTION INTRAMUSCULAR; INTRAVENOUS at 09:11

## 2017-08-27 RX ADMIN — GENTAMICIN SULFATE: 1 CREAM TOPICAL at 17:40

## 2017-08-27 RX ADMIN — PHENYLEPHRINE HYDROCHLORIDE 100 MCG: 10 INJECTION, SOLUTION INTRAMUSCULAR; INTRAVENOUS; SUBCUTANEOUS at 10:19

## 2017-08-27 RX ADMIN — HYDROMORPHONE HYDROCHLORIDE 0.3 MG: 1 INJECTION, SOLUTION INTRAMUSCULAR; INTRAVENOUS; SUBCUTANEOUS at 14:25

## 2017-08-27 RX ADMIN — PHENYLEPHRINE HYDROCHLORIDE 100 MCG: 10 INJECTION, SOLUTION INTRAMUSCULAR; INTRAVENOUS; SUBCUTANEOUS at 10:33

## 2017-08-27 RX ADMIN — FERROUS SULFATE TAB 325 MG (65 MG ELEMENTAL FE) 325 MG: 325 (65 FE) TAB at 19:41

## 2017-08-27 RX ADMIN — ROCURONIUM BROMIDE 20 MG: 10 INJECTION INTRAVENOUS at 09:27

## 2017-08-27 RX ADMIN — SODIUM CHLORIDE, POTASSIUM CHLORIDE, SODIUM LACTATE AND CALCIUM CHLORIDE: 600; 310; 30; 20 INJECTION, SOLUTION INTRAVENOUS at 08:58

## 2017-08-27 RX ADMIN — ACETAMINOPHEN 1000 MG: 500 TABLET, FILM COATED ORAL at 19:41

## 2017-08-27 RX ADMIN — SODIUM CHLORIDE, POTASSIUM CHLORIDE, SODIUM LACTATE AND CALCIUM CHLORIDE: 600; 310; 30; 20 INJECTION, SOLUTION INTRAVENOUS at 12:35

## 2017-08-27 RX ADMIN — ROCURONIUM BROMIDE 20 MG: 10 INJECTION INTRAVENOUS at 10:10

## 2017-08-27 RX ADMIN — INSULIN GLARGINE 12 UNITS: 100 INJECTION, SOLUTION SUBCUTANEOUS at 22:02

## 2017-08-27 RX ADMIN — WARFARIN SODIUM 5 MG: 5 TABLET ORAL at 18:22

## 2017-08-27 RX ADMIN — FENTANYL CITRATE 100 MCG: 50 INJECTION, SOLUTION INTRAMUSCULAR; INTRAVENOUS at 08:48

## 2017-08-27 RX ADMIN — ONDANSETRON 4 MG: 2 INJECTION INTRAMUSCULAR; INTRAVENOUS at 11:36

## 2017-08-27 RX ADMIN — PHENYLEPHRINE HYDROCHLORIDE 100 MCG: 10 INJECTION, SOLUTION INTRAMUSCULAR; INTRAVENOUS; SUBCUTANEOUS at 09:12

## 2017-08-27 RX ADMIN — PROPOFOL 20 MG: 10 INJECTION, EMULSION INTRAVENOUS at 11:47

## 2017-08-27 RX ADMIN — TRANEXAMIC ACID 1 G: 100 INJECTION, SOLUTION INTRAVENOUS at 09:16

## 2017-08-27 RX ADMIN — LIDOCAINE HYDROCHLORIDE 90 MG: 20 INJECTION, SOLUTION INFILTRATION; PERINEURAL at 08:48

## 2017-08-27 RX ADMIN — OXYCODONE HYDROCHLORIDE 5 MG: 5 TABLET ORAL at 17:39

## 2017-08-27 RX ADMIN — CEFAZOLIN 1 G: 1 INJECTION, POWDER, FOR SOLUTION INTRAMUSCULAR; INTRAVENOUS at 11:12

## 2017-08-27 RX ADMIN — CEFAZOLIN 1 G: 330 INJECTION, POWDER, FOR SOLUTION INTRAMUSCULAR; INTRAVENOUS at 15:28

## 2017-08-27 RX ADMIN — ACETAMINOPHEN 1000 MG: 500 TABLET, FILM COATED ORAL at 15:28

## 2017-08-27 RX ADMIN — FENTANYL CITRATE 25 MCG: 50 INJECTION, SOLUTION INTRAMUSCULAR; INTRAVENOUS at 13:28

## 2017-08-27 RX ADMIN — ROCURONIUM BROMIDE 10 MG: 10 INJECTION INTRAVENOUS at 10:46

## 2017-08-27 RX ADMIN — SIMVASTATIN 10 MG: 10 TABLET, FILM COATED ORAL at 22:02

## 2017-08-27 RX ADMIN — FENTANYL CITRATE 25 MCG: 50 INJECTION, SOLUTION INTRAMUSCULAR; INTRAVENOUS at 12:44

## 2017-08-27 RX ADMIN — PROPOFOL 70 MG: 10 INJECTION, EMULSION INTRAVENOUS at 08:48

## 2017-08-27 RX ADMIN — SODIUM CHLORIDE, POTASSIUM CHLORIDE, SODIUM LACTATE AND CALCIUM CHLORIDE: 600; 310; 30; 20 INJECTION, SOLUTION INTRAVENOUS at 08:40

## 2017-08-27 RX ADMIN — SILVER SULFADIAZINE: 10 CREAM TOPICAL at 17:40

## 2017-08-27 RX ADMIN — MIDAZOLAM HYDROCHLORIDE 1 MG: 1 INJECTION, SOLUTION INTRAMUSCULAR; INTRAVENOUS at 08:40

## 2017-08-27 RX ADMIN — METHOCARBAMOL 500 MG: 500 TABLET ORAL at 15:28

## 2017-08-27 RX ADMIN — ALBUMIN HUMAN 250 ML: 50 SOLUTION INTRAVENOUS at 11:18

## 2017-08-27 RX ADMIN — FENTANYL CITRATE 25 MCG: 50 INJECTION, SOLUTION INTRAMUSCULAR; INTRAVENOUS at 13:48

## 2017-08-27 RX ADMIN — FENTANYL CITRATE 50 MCG: 50 INJECTION, SOLUTION INTRAMUSCULAR; INTRAVENOUS at 09:50

## 2017-08-27 RX ADMIN — SUGAMMADEX 200 MG: 100 INJECTION, SOLUTION INTRAVENOUS at 11:53

## 2017-08-27 RX ADMIN — ROCURONIUM BROMIDE 50 MG: 10 INJECTION INTRAVENOUS at 08:48

## 2017-08-27 RX ADMIN — CEFAZOLIN 1 G: 330 INJECTION, POWDER, FOR SOLUTION INTRAMUSCULAR; INTRAVENOUS at 22:03

## 2017-08-27 RX ADMIN — ROCURONIUM BROMIDE 10 MG: 10 INJECTION INTRAVENOUS at 11:09

## 2017-08-27 RX ADMIN — ONDANSETRON 4 MG: 2 INJECTION INTRAMUSCULAR; INTRAVENOUS at 12:58

## 2017-08-27 ASSESSMENT — PAIN DESCRIPTION - DESCRIPTORS
DESCRIPTORS: BURNING
DESCRIPTORS: DISCOMFORT;SORE

## 2017-08-27 NOTE — OR NURSING
Patient transport delayed pre-op time for RN. OR room prepared and surgeons brought patient down to PACU. Asked anesthesia if wanted FSBG completed prior to OR. Denied the need, will obtain in OR. Patient wife not present, called 7B in attempts to find her. Unable to locate. Patient and demographics did not have cell phone number of wife. Hearing aids, two rings, wrist watch, and necklace removed from patient, placed into clear bag, labeled with patient label and clipped into patient chart. Unable to pre-op patient fully secondary to team and frequent interruptions. Checklist was completed, consent was signed and witnessed, left hip was marked, pulses were dopplered. Patient did not have questions.

## 2017-08-27 NOTE — ANESTHESIA CARE TRANSFER NOTE
Patient: Amos Walker    Procedure(s):  Left Total Hip - Wound Class: I-Clean    Diagnosis: Left Hip Fracture  Diagnosis Additional Information: No value filed.    Anesthesia Type:   General, ETT     Note:  Airway :Face Mask  Patient transferred to:PACU  Comments: VSS. Patient satting well on simple face mask with pain well controlled. No nausea or vomiting.       Vitals: (Last set prior to Anesthesia Care Transfer)    CRNA VITALS  8/27/2017 1137 - 8/27/2017 1208      8/27/2017             Resp Rate (set): 10                Electronically Signed By: Brayan Alcala MD  August 27, 2017  12:08 PM

## 2017-08-27 NOTE — BRIEF OP NOTE
[facility] Brief Operative Note    Pre-operative diagnosis: Left Hip Fracture   Post-operative diagnosis Same   Procedure: Procedure(s):  Left Total Hip - Wound Class: I-Clean   Surgeon(s): Surgeon(s) and Role:     * Ish Jackman MD - Primary     * Afsaneh Merino MD - Resident - Assisting     * Christina Chaudhry MD - Resident - Assisting   Estimated blood loss: 300 mL    Specimens: * No specimens in log *   Findings: See dictated op note    Plan:  Diet: Regular  Weight Bearing: WBAT  Abx: 24 hours of Ancef postoperatively   DVT PPX: Restart home plavix POD1  No abduction pillow need after 24 hours  Post-op XRs in PACU AP/Lateral left hip  F/u 2 weeks with Dr. Jackman  Pain Management  PT/OT; posterior hip precautions  Condition: To PACU in stable condition
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
## 2017-08-27 NOTE — PROGRESS NOTES
"Orthopaedic Surgery Progress Note     Subjective/Events: No acute overnight events. Pain controlled. NPO since midnight, voiding.    Objective:  /60 (BP Location: Left arm)  Pulse 77  Temp 97  F (36.1  C) (Oral)  Resp 16  Ht 1.88 m (6' 2\")  Wt 89.9 kg (198 lb 1.6 oz)  SpO2 96%  BMI 25.43 kg/m2  Temp (24hrs), Av  F (36.7  C), Min:96.9  F (36.1  C), Max:99  F (37.2  C)    Gen: A&Ox3, no acute distress  CV: 2+ dp/pt pulses, capillary refill < 2sec  Resp: breathing equal and non-labored, no wheezing  LLE: 5/5 TA GSC EHL FHL, Sensation decreased throughout foot, 2+ dp pulse    Assessment: 72M s/p fall with left femoral neck fracture  Plan:  -Weight Bearing Status: NWB LLE  -Bedrest  -VTE prophylaxis: lovenox held  -NPO     Plan for OR today for left total hip arthroplasty    Afsaneh Robles MD  PGY-4  [phone]  "

## 2017-08-27 NOTE — PROGRESS NOTES
[facility], [place]  Trauma Service Progress Note    Date of Service (when I saw the patient): 08/27/2017     Assessment & Plan   Trauma mechanism:Fall                   Time/date of injury: 8/25/2017, around 6pm                 Known Injuries:  1. Left femoral neck fracture  2. Left elbow bruising  Other diagnosis   1. Acute pain  2. Hypertension  3. Hyperlipidemia   4. PAD s/p right leg stenting--on Plavix and ASA   5. Chronic lower extremity ulcers with MRSA  6. Diabetes Mellitus   7. Chronic kidney disease, stage 3  8. Tobacco dependence  Procedure: pending   Plan:  1. Tertiary survey done 8/26, no new injuries identified. left elbow with some ecchymosis, imaging negative for fx though occult fx not ruled out.    2. Appreciate medicine team consult and medical management. Restart lantus at half dose tonight with sliding scale insulin correction. Continue to hold oral anti glycemic medications. Will continue to hold diuretics and antihypertensive agents and reassess in the am  3. Appreciate Orthopedics consult, AGUEDA done today with Dr Jackman.  Continue to hold ASA and Plavix.  ml, no obvious complications.  Posterior hip precautions, abductor pillow x 24 hours, WBAT, warfarin for DVT proph.  Restart ASA/plavix tomorrow if appropriate at that time  4. ADAT  5. WOC consult for continued cares of RLE ulcerations. Per patient, he typically does daily wound cares with Gentamycin, topical silvadene and cover with dressing.   6. Pain control: oral and IV medications. Ice as needed for swelling of left hip.  Add antispasmotics scheduled for now  7. Home medications: reviewed and appropriate medications resumed.   8. Physical and occupational therapy consults when able   9. Social work consult for discharge planning       Code status: Full Code      General Cares:  GI Prophylaxis: none  DVT Prophylaxis: SCDs. Lovenox pre-op.  Warfarin starts tonight  Date of last stool/Bowel  Regimen:8/24/2017  Pulmonary toilet:    Code status: Full  Disposition: 7B, hospital dispo TBD  Interval History   No acute events.  Pt in OR/PACU all day.  Now post op, groggy but awake, wife at bedside.  Denies cp, sob, nausea, abd pain.  +menjivar, + hip pain, + buttocks numbness  ROS x 8 negative with exception of those things listed in interval hx    Physical Exam   Temp: 97.8  F (36.6  C) Temp src: Oral BP: 137/58 Pulse: 77 Heart Rate: 93 Resp: 17 SpO2: 99 % O2 Device: Nasal cannula Oxygen Delivery: 2 LPM  Vitals:    08/24/17 2120 08/25/17 0234   Weight: 82.1 kg (181 lb) 89.9 kg (198 lb 1.6 oz)     Vital Signs with Ranges  Temp:  [96.8  F (36  C)-98.9  F (37.2  C)] 97.8  F (36.6  C)  Pulse:  [77-82] 77  Heart Rate:  [82-96] 93  Resp:  [14-18] 17  BP: (118-159)/(55-68) 137/58  FiO2 (%):  [33 %-96 %] 96 %  SpO2:  [95 %-100 %] 99 %  I/O last 3 completed shifts:  In: 1925 [P.O.:240; I.V.:1685]  Out: 1850 [Urine:1550; Blood:300]    Shayan Coma Scale - Total 15/15    Constitutional: Awake, alert, cooperative, no apparent distress  Eyes: Lids and lashes normal, pupils equal, round and reactive to light, extra ocular muscles intact, sclera clear, conjunctiva normal.  ENT: Normocephalic, atraumatic  Respiratory: No increased work of breathing, good air exchange, clear to auscultation bilaterally, no crackles or wheezing.  Cardiovascular:  regular rate and rhythm, normal S1 and S2,  and no murmur noted.  GI: Normal bowel sounds, soft, non-distended, non-tender, no guarding  Genitourinary:  Yellow and clear, menjivar  Skin:  pale skin color, no redness, warmth, or swelling, no ecchymosis, no abrasions, and no jaundice. Incision covered CDI  Musculoskeletal: There is no redness, warmth, or swelling of the joints.  Pedal pulse palpated  Neurologic: Awake, alert, oriented.  Cranial nerves II-XII are grossly intact.   No focal deficits  Neuropsychiatric: Calm, normal eye contact, alert, affect appropriate to situation, oriented,  thought process normal.    LEW Orona CNP  To contact the trauma service use job code pager [phone],   Numeric texts or alpha text through [facility]

## 2017-08-27 NOTE — OR NURSING
Patient bilateral hearing aids replaced into ear canals per patient. Two rings, wrist watch, and necklace placed into patient hardcopy of chart. Labeled.

## 2017-08-27 NOTE — PHARMACY-ANTICOAGULATION SERVICE
Clinical Pharmacy - Warfarin Dosing Consult     Pharmacy has been consulted to manage this patient s warfarin therapy.  Indication: DVT/PE Prophylaxis  Therapy Goal: INR 2-3  Provider/Team: Surgery  Warfarin Prior to Admission: No  Significant drug interactions: Simvastatin (increase bleeding and rhadomyolysis risk)  Recent documented change in oral intake/nutrition: No    INR   Date Value Ref Range Status   08/25/2017 1.13 0.86 - 1.14 Final       Recommend warfarin 5 mg today.  Pharmacy will monitor Amos Walker daily and order warfarin doses to achieve specified goal.      Please contact pharmacy as soon as possible if the warfarin needs to be held for a procedure or if the warfarin goals change.      Johanna Jimenez, PharmD

## 2017-08-27 NOTE — PROGRESS NOTES
"       Interval History:     No acute events and doing well in PACU. Pain is adequately controlled on current regimen.           Physical Exam:     Vital Signs:  /60 (BP Location: Left arm)  Pulse 77  Temp 97  F (36.1  C) (Oral)  Resp 16  Ht 1.88 m (6' 2\")  Wt 89.9 kg (198 lb 1.6 oz)  SpO2 96%  BMI 25.43 kg/m2    Intake/Output Summary (Last 24 hours) at 08/27/17 1209  Last data filed at 08/27/17 1205   Gross per 24 hour   Intake             1890 ml   Output             1850 ml   Net               40 ml     Gen:    No acute distress. Alert.  Pulm:  Non-labored breathing  Incision:   Dressing c/d/i    Operative extremity:   Motor: ehl/fhl/gs/at intact  Sensory: intact light touch dp/sp/t/s/s  Vascular: 2+ PT pulse    Labs:  CBC  Recent Labs  Lab 08/24/17  2131   WBC 8.0   HGB 11.5*          Imaging: post operative AGUEDA films reviewed showing implants in good position and well fixed.       Assessment and Plan:    Amos Walker is a 72 year old male who is s/p Left AGUEDA for femoral neck fracture on 8/27/2017     Activity: Up with assist.  Posterior hip precautions.  Gait aids PRN.  OOB to chair for all meals.  Weight bearing status: WBAT  Antibiotics/Tetanus: Routine post operative antibiotics x 2 doses.  Diet: Begin with clear fluids and progress diet as tolerated.  DVT prophylaxis: SCDs while in hospital, resume home plavix.  Bracing/Splinting: No brace, pillow, or splint needed.  Ice: Ice operative site 20 minutes >4 times daily.  Wound Care: Leave Aquacel in place until POD10, then remove and okay to keep off.    Drains: No drain.  Pain management: transition from IV to orals as tolerated.   Physical Therapy/Occupational Therapy: WB and precautions noted above, gait transfers, ROM, ADL's.     Follow-up: Clinic with Dr. Jackman around 2 weeks postoperatively for wound check and xrays as needed.    Disposition: Pending progress with therapies, pain control on orals, and medical stability, anticipate " discharge to SNF on POD #3.

## 2017-08-27 NOTE — OP NOTE
PREOPERATIVE DIAGNOSIS: Left transcervical femoral neck fracture    POSTOPERATIVE DIAGNOSIS: Same as pre-op.  PROCEDURE: Left Total Hip Arthroplasty  DATE OF SURGERY: 8/27/2017   ASSISTANTS:   MD Dominic Low MD Lisa Friedman, MD    COMPONENTS USED:  1. Kaushik Avanir Femoral Stem Size 7, lateralized  2. Biomet G7 Acetabular Component Size 58,    3. Biomet ArCOM XL Polyethylene Liner 36 ID Neutral    3. Biolox Delta CobaltHead Size 36+3.5    ANESTHESIA: General  COMPLICATIONS: None  EBL: 300cc    FINDINGS: Diffuse comminution of fracture site, osteopenic bone and patulous femoral canal.  Acetabular component well positioned.  Femoral component well positioned.  Intraoperative stability with full extension and ER, flexion to 90 with IR to 70, and in the sleep position.  Appropriate limb length.  Intraoperative imaging with trial femoral component confirmed appropriate position of the implants.  After cementing there was concern for non displaced calcar fracture, although we were not able to see any extension there was a small portion of the calcar that looked concerning.  A cable was placed at the level of the calcar.  Final films in room showed acceptable stem position and no extension of a fracture.    INDICATIONS: Amos Walker is a 72 year old male with a left transcervical femoral neck fracture.  We discussed the risks, benefits, and alternatives to total hip arthroplasty with the patient.  Please see H/P consult note for full details of the preoperative discussion.  Following that the discussion, the patient elected to proceed with total hip arthroplasty.   The medical service evaluated the patient and deemed them in optimized condition for surgery.    DESCRIPTION OF PROCEDURE: We met the patient in the preoperative area.  There we again reviewed the risks, benefits, and alternatives to total hip arthroplasty.  Informed written consent was obtained.  The operative hip was marked with an  "indelible marker after patient confirmation of the operative site.  All the patient's questions were answered.  The patient was taken to the operating room and underwent induction of  general anesthesia.  The patient was placed on the operating table in the lateral decubitus position with the operative site up.  Bony prominences were well padded and was secured to the table with the Wixon positioner. The patient was prepped and draped in the normal sterile fashion.       A timeout was performed in which the patient's name, MRN, imaging, preoperative plan and laterality was reviewed.  We also confirmed that the patient received the appropriate preoperative IV antibiotics.  1 gm of TXA was given.        A posterior approach to the hip joint was performed making a incision over the greater trochanter and taking this down through the subcutaneous tissue. The gluteus liana and IT band was then split in line with its fibers. A charnley retractor was placed and hemostasis was obtained.  After internal rotation of the femur, the piriformis and obturator internus muscles were detached and the capsulotomy was performed in a standard \"L\" shape.  The capsule and pirformis was tagged.  The limb was positioned in the sleep position and a bebo was made on the greater trochanter to help assess limb length and offset.  The fracture was noted and the head portion was removed.  The residual neck was osteopenic and comminuted.  The fracture extended distally near the level of the lesser troch.  The remaining neck was removed with a saw in accordance with the preoperative plan.      The acetabulum was exposed with an anterior retractor and posteroinferior retractor.  The remaining acetabular labrum was removed.  The pulvinar was removed and the medial wall was identified.  We began reaming with care to maintain the anterior and posterior walls.  We began with a size 50 reamer and reamed to 57.  At this point the sclerotic bone was " removed back to healthy bleeding cancelleous bone.  The 58 G7 cup was placed.  It had excellent initial stability.  One acetabular screw was placed further securing the cup.  Any prominent anterior and inferior osteophytes were debrided and carefully removed.  A trial liner was placed.    Attention was turned to the femur, which was exposed in the standard fashion with a femoral elevator.  Care was taken to not damage the gluteus medius.  A box osteotome and rongeur was used to remove residual lateral neck.  The canal was identified and reamed with a Charnley Awl.  The bone and canal were osteopenic and we elected to use a cemented stem.  The femoral broaches was positioned in the appropriate anteversion, care was taken to broach the lateral cancellous bone.  We broached up to size 7.  Care was taken to minimize risk of calcar fracture.  The final trial fit well.  A lateralized offset neck was placed with a +0 head.  The hip was reduced.  The stability was examined and  was found to be secure and stable in full extension and external rotation of > 45 degrees as well as flexion of 90 degrees combined with internal rotation > 80 degrees.  Leg lengths were judged to be within 5 mm of symmetry. An intraoperative x-ray was taken and we were happy with the position and the fit of the components.      The hip was dislocated and the trial femoral and cup liner were removed.  The cup was irrigated and the final neutral 36 ID liner was placed.  A cement restrictor was placed.  The canal was irrigated with a long lavage tip.  Dry vag packing was placed.  The femoral component was cemented into place in the appropriate anteversion.  At this point there was some inferomedial calcar that looked concerning for fracture.  There was no distinct fracture line.  The site was exposed and there was a 2x2 mm section of cortical bone that was gone.  No definite fracture was identified.  However, given the bone loss, I was concerned for a  fracture and elected to place a cerclage cable.  The cable was placed under the lateralis in the standard fashion.  The hip was trialed with a 36+3.5 head.  Again, the  stability was examined and  was found to be secure and stable in full extension and external rotation of > 45 degrees as well as flexion of 90 degrees combined with internal rotation > 70 degrees.  Leg lengths were judged to be appropriate based on symmetry as evaluated by limb palpation and the markings on the greater trochanter. The hip was dislocated and the trial head was removed.  The trunion was washed and dried and the final 36+3.5 head was impacted into place.  The hip was reduced and again stable as listed above.  A final intraoperative x-ray demonstrated appropriate fit and fill of the implants and no extension of the concern for fracture.    The hip was thoroughly irrigated.  The capsule, piriformis and external rotators were repaired to the medius tendon.  The IT and gluteal fascia was closed with #1 stratafix.  The deep dermal layer was closed with 2-0 stratafix.  The skin was closed with 3- monocryl and a sterile aquacel bandage was placed. The patient was positioned upright and awoken from anesthesia.  The patient was transferred to the PACU in stable condition.      POSTOPERATIVE PLAN:  Weight bearing: WBAT  Anticoagulation: Coumadin x 4 weeks with SCDs  Precautions: Posterior hip precautions  Pain control and monitoring

## 2017-08-27 NOTE — OR NURSING
Paged Dr. Jackman that x-ray was completed and will be transferring to 7B soon. Beryl Dickens RN took patient wife upstairs to patient room. Spoke with Dr. Gaitan, OK for patient to go to unit without cardiac monitoring for Tranexamic Acid infusion.

## 2017-08-27 NOTE — ANESTHESIA POSTPROCEDURE EVALUATION
Patient: Amos Walker    Procedure(s):  Left Total Hip - Wound Class: I-Clean    Diagnosis:Left Hip Fracture  Diagnosis Additional Information: No value filed.    Anesthesia Type:  General, ETT    Note:  Anesthesia Post Evaluation    Patient location during evaluation: PACU  Patient participation: Able to fully participate in evaluation  Level of consciousness: awake  Pain management: satisfactory to patient  Airway patency: patent  Cardiovascular status: acceptable  Respiratory status: acceptable  Hydration status: acceptable  PONV: none     Anesthetic complications: None          Last vitals:  Vitals:    08/27/17 1215 08/27/17 1230 08/27/17 1245   BP: 159/68 128/58    Pulse:      Resp: 14 14 14   Temp: 37.2  C (98.9  F)     SpO2: 100% 100% 100%         Electronically Signed By: Junaid Bettencourt MD  August 27, 2017  1:03 PM

## 2017-08-27 NOTE — PLAN OF CARE
Problem: Goal Outcome Summary  Goal: Goal Outcome Summary  Outcome: No Change  [id number]  AVSS, pt A&O x 4, denies pain, Pt able to shift in bed independently. Voiding adequate via urinal. NPO since midnight. Plan for surgery today.  and 198. Refused sliding scale insulin on evenings because he never ate dinner. Declined stool softeners- had 2 large BM's yesterday. Pt going to preop around 0730, report given to preop nurse. Continuing to monitor per POC.

## 2017-08-27 NOTE — PLAN OF CARE
Problem: Goal Outcome Summary  Goal: Goal Outcome Summary  Outcome: No Change  Vitals:     08/27/17 1500 08/27/17 1530 08/27/17 1600 08/27/17 1700   BP: 141/58 146/58 140/57 138/59   BP Location: Left arm Left arm Left arm Left arm   Pulse:           Resp: 17 18 17   Temp: 97.8  F (36.6  C)         TempSrc: Oral         SpO2: 100% 99% 100% 99%   Weight:           Height:             Pt went down to OR around 800 this am, and returned back to the unit around 1400, AVSS, pt A&O x 4, intermittent complaints of pain, PO oxycodone given x 1 and IV dilaudid given x 1 with good relief. Left leg incision dressing CDI. Abductor pillow between legs. Repositioned pt q 2 hours. Urine output adequate via menjivar. Diet advanced to regular, pt tolerating. Continuing to monitor per POC.

## 2017-08-28 ENCOUNTER — APPOINTMENT (OUTPATIENT)
Dept: OCCUPATIONAL THERAPY | Facility: CLINIC | Age: 72
DRG: 470 | End: 2017-08-28
Attending: NURSE PRACTITIONER
Payer: MEDICARE

## 2017-08-28 LAB
ANION GAP SERPL CALCULATED.3IONS-SCNC: 6 MMOL/L (ref 3–14)
BUN SERPL-MCNC: 19 MG/DL (ref 7–30)
CALCIUM SERPL-MCNC: 7.6 MG/DL (ref 8.5–10.1)
CHLORIDE SERPL-SCNC: 103 MMOL/L (ref 94–109)
CO2 SERPL-SCNC: 24 MMOL/L (ref 20–32)
CREAT SERPL-MCNC: 1.06 MG/DL (ref 0.66–1.25)
ERYTHROCYTE [DISTWIDTH] IN BLOOD BY AUTOMATED COUNT: 12.8 % (ref 10–15)
GFR SERPL CREATININE-BSD FRML MDRD: 69 ML/MIN/1.7M2
GLUCOSE BLDC GLUCOMTR-MCNC: 177 MG/DL (ref 70–99)
GLUCOSE BLDC GLUCOMTR-MCNC: 207 MG/DL (ref 70–99)
GLUCOSE BLDC GLUCOMTR-MCNC: 251 MG/DL (ref 70–99)
GLUCOSE BLDC GLUCOMTR-MCNC: 292 MG/DL (ref 70–99)
GLUCOSE BLDC GLUCOMTR-MCNC: 332 MG/DL (ref 70–99)
GLUCOSE SERPL-MCNC: 174 MG/DL (ref 70–99)
HCT VFR BLD AUTO: 28.2 % (ref 40–53)
HGB BLD-MCNC: 9.4 G/DL (ref 13.3–17.7)
INR PPP: 1.35 (ref 0.86–1.14)
MAGNESIUM SERPL-MCNC: 1.4 MG/DL (ref 1.6–2.3)
MAGNESIUM SERPL-MCNC: 2.5 MG/DL (ref 1.6–2.3)
MCH RBC QN AUTO: 30.9 PG (ref 26.5–33)
MCHC RBC AUTO-ENTMCNC: 33.3 G/DL (ref 31.5–36.5)
MCV RBC AUTO: 93 FL (ref 78–100)
PLATELET # BLD AUTO: 125 10E9/L (ref 150–450)
POTASSIUM SERPL-SCNC: 4.1 MMOL/L (ref 3.4–5.3)
RBC # BLD AUTO: 3.04 10E12/L (ref 4.4–5.9)
SODIUM SERPL-SCNC: 133 MMOL/L (ref 133–144)
WBC # BLD AUTO: 8.1 10E9/L (ref 4–11)

## 2017-08-28 PROCEDURE — 80048 BASIC METABOLIC PNL TOTAL CA: CPT | Performed by: ORTHOPAEDIC SURGERY

## 2017-08-28 PROCEDURE — A9270 NON-COVERED ITEM OR SERVICE: HCPCS | Mod: GY | Performed by: SURGERY

## 2017-08-28 PROCEDURE — 97165 OT EVAL LOW COMPLEX 30 MIN: CPT | Mod: GO

## 2017-08-28 PROCEDURE — 25000132 ZZH RX MED GY IP 250 OP 250 PS 637: Mod: GY | Performed by: SURGERY

## 2017-08-28 PROCEDURE — 85610 PROTHROMBIN TIME: CPT | Performed by: ORTHOPAEDIC SURGERY

## 2017-08-28 PROCEDURE — 36415 COLL VENOUS BLD VENIPUNCTURE: CPT | Performed by: SURGERY

## 2017-08-28 PROCEDURE — 25000132 ZZH RX MED GY IP 250 OP 250 PS 637: Mod: GY | Performed by: NURSE PRACTITIONER

## 2017-08-28 PROCEDURE — 83735 ASSAY OF MAGNESIUM: CPT | Performed by: SURGERY

## 2017-08-28 PROCEDURE — A9270 NON-COVERED ITEM OR SERVICE: HCPCS | Mod: GY | Performed by: NURSE PRACTITIONER

## 2017-08-28 PROCEDURE — 83735 ASSAY OF MAGNESIUM: CPT | Performed by: ORTHOPAEDIC SURGERY

## 2017-08-28 PROCEDURE — 85027 COMPLETE CBC AUTOMATED: CPT | Performed by: ORTHOPAEDIC SURGERY

## 2017-08-28 PROCEDURE — 25000128 H RX IP 250 OP 636: Performed by: NURSE PRACTITIONER

## 2017-08-28 PROCEDURE — 40000133 ZZH STATISTIC OT WARD VISIT

## 2017-08-28 PROCEDURE — 25000132 ZZH RX MED GY IP 250 OP 250 PS 637: Mod: GY | Performed by: PHYSICIAN ASSISTANT

## 2017-08-28 PROCEDURE — 12000008 ZZH R&B INTERMEDIATE UMMC

## 2017-08-28 PROCEDURE — 00000146 ZZHCL STATISTIC GLUCOSE BY METER IP

## 2017-08-28 PROCEDURE — 36415 COLL VENOUS BLD VENIPUNCTURE: CPT | Performed by: ORTHOPAEDIC SURGERY

## 2017-08-28 PROCEDURE — A9270 NON-COVERED ITEM OR SERVICE: HCPCS | Mod: GY | Performed by: PHYSICIAN ASSISTANT

## 2017-08-28 PROCEDURE — 97530 THERAPEUTIC ACTIVITIES: CPT | Mod: GO

## 2017-08-28 RX ORDER — ASCORBIC ACID 500 MG
500 TABLET ORAL 2 TIMES DAILY
Status: DISCONTINUED | OUTPATIENT
Start: 2017-08-28 | End: 2017-08-28

## 2017-08-28 RX ORDER — POTASSIUM CL/LIDO/0.9 % NACL 10MEQ/0.1L
10 INTRAVENOUS SOLUTION, PIGGYBACK (ML) INTRAVENOUS
Status: DISCONTINUED | OUTPATIENT
Start: 2017-08-28 | End: 2017-08-30 | Stop reason: HOSPADM

## 2017-08-28 RX ORDER — POTASSIUM CHLORIDE 750 MG/1
20-40 TABLET, EXTENDED RELEASE ORAL
Status: DISCONTINUED | OUTPATIENT
Start: 2017-08-28 | End: 2017-08-30 | Stop reason: HOSPADM

## 2017-08-28 RX ORDER — CLOPIDOGREL BISULFATE 75 MG/1
75 TABLET ORAL DAILY
Status: DISCONTINUED | OUTPATIENT
Start: 2017-08-28 | End: 2017-08-30 | Stop reason: HOSPADM

## 2017-08-28 RX ORDER — POTASSIUM CHLORIDE 1.5 G/1.58G
20-40 POWDER, FOR SOLUTION ORAL
Status: DISCONTINUED | OUTPATIENT
Start: 2017-08-28 | End: 2017-08-30 | Stop reason: HOSPADM

## 2017-08-28 RX ORDER — POTASSIUM CHLORIDE 29.8 MG/ML
20 INJECTION INTRAVENOUS
Status: DISCONTINUED | OUTPATIENT
Start: 2017-08-28 | End: 2017-08-30 | Stop reason: HOSPADM

## 2017-08-28 RX ORDER — WARFARIN SODIUM 5 MG/1
5 TABLET ORAL
Status: COMPLETED | OUTPATIENT
Start: 2017-08-28 | End: 2017-08-28

## 2017-08-28 RX ORDER — MAGNESIUM SULFATE HEPTAHYDRATE 40 MG/ML
4 INJECTION, SOLUTION INTRAVENOUS EVERY 4 HOURS PRN
Status: DISCONTINUED | OUTPATIENT
Start: 2017-08-28 | End: 2017-08-30 | Stop reason: HOSPADM

## 2017-08-28 RX ORDER — CALCIUM CARBONATE 500(1250)
1250 TABLET ORAL 2 TIMES DAILY WITH MEALS
Status: DISCONTINUED | OUTPATIENT
Start: 2017-08-28 | End: 2017-08-30 | Stop reason: HOSPADM

## 2017-08-28 RX ORDER — ASPIRIN 81 MG/1
81 TABLET, CHEWABLE ORAL DAILY
Status: DISCONTINUED | OUTPATIENT
Start: 2017-08-28 | End: 2017-08-28

## 2017-08-28 RX ORDER — POTASSIUM CHLORIDE 7.45 MG/ML
10 INJECTION INTRAVENOUS
Status: DISCONTINUED | OUTPATIENT
Start: 2017-08-28 | End: 2017-08-30 | Stop reason: HOSPADM

## 2017-08-28 RX ADMIN — VITAMIN D, TAB 1000IU (100/BT) 3000 UNITS: 25 TAB at 10:15

## 2017-08-28 RX ADMIN — INSULIN ASPART 3 UNITS: 100 INJECTION, SOLUTION INTRAVENOUS; SUBCUTANEOUS at 19:40

## 2017-08-28 RX ADMIN — FERROUS SULFATE TAB 325 MG (65 MG ELEMENTAL FE) 325 MG: 325 (65 FE) TAB at 08:43

## 2017-08-28 RX ADMIN — SENNOSIDES AND DOCUSATE SODIUM 2 TABLET: 8.6; 5 TABLET ORAL at 19:37

## 2017-08-28 RX ADMIN — METHOCARBAMOL 500 MG: 500 TABLET ORAL at 16:21

## 2017-08-28 RX ADMIN — OXYCODONE HYDROCHLORIDE AND ACETAMINOPHEN 1000 MG: 500 TABLET ORAL at 08:43

## 2017-08-28 RX ADMIN — MAGNESIUM SULFATE IN WATER 4 G: 40 INJECTION, SOLUTION INTRAVENOUS at 10:16

## 2017-08-28 RX ADMIN — GENTAMICIN SULFATE: 1 CREAM TOPICAL at 08:47

## 2017-08-28 RX ADMIN — WARFARIN SODIUM 5 MG: 5 TABLET ORAL at 19:36

## 2017-08-28 RX ADMIN — ACETAMINOPHEN 1000 MG: 500 TABLET, FILM COATED ORAL at 16:21

## 2017-08-28 RX ADMIN — INSULIN GLARGINE 12 UNITS: 100 INJECTION, SOLUTION SUBCUTANEOUS at 21:42

## 2017-08-28 RX ADMIN — CALCIUM 1250 MG: 500 TABLET ORAL at 19:37

## 2017-08-28 RX ADMIN — OXYCODONE HYDROCHLORIDE 5 MG: 5 TABLET ORAL at 16:21

## 2017-08-28 RX ADMIN — CALCIUM 1250 MG: 500 TABLET ORAL at 12:28

## 2017-08-28 RX ADMIN — METHOCARBAMOL 500 MG: 500 TABLET ORAL at 19:37

## 2017-08-28 RX ADMIN — OXYCODONE HYDROCHLORIDE 5 MG: 5 TABLET ORAL at 06:04

## 2017-08-28 RX ADMIN — METHOCARBAMOL 500 MG: 500 TABLET ORAL at 12:28

## 2017-08-28 RX ADMIN — SIMVASTATIN 10 MG: 10 TABLET, FILM COATED ORAL at 21:42

## 2017-08-28 RX ADMIN — SILVER SULFADIAZINE: 10 CREAM TOPICAL at 08:48

## 2017-08-28 RX ADMIN — ACETAMINOPHEN 1000 MG: 500 TABLET, FILM COATED ORAL at 08:43

## 2017-08-28 RX ADMIN — ENOXAPARIN SODIUM 40 MG: 40 INJECTION SUBCUTANEOUS at 10:15

## 2017-08-28 RX ADMIN — OXYCODONE HYDROCHLORIDE 5 MG: 5 TABLET ORAL at 19:37

## 2017-08-28 RX ADMIN — METHOCARBAMOL 500 MG: 500 TABLET ORAL at 08:43

## 2017-08-28 RX ADMIN — FERROUS SULFATE TAB 325 MG (65 MG ELEMENTAL FE) 325 MG: 325 (65 FE) TAB at 19:36

## 2017-08-28 RX ADMIN — CLOPIDOGREL 75 MG: 75 TABLET, FILM COATED ORAL at 10:16

## 2017-08-28 RX ADMIN — INSULIN ASPART 1 UNITS: 100 INJECTION, SOLUTION INTRAVENOUS; SUBCUTANEOUS at 08:43

## 2017-08-28 ASSESSMENT — PAIN DESCRIPTION - DESCRIPTORS: DESCRIPTORS: PATIENT UNABLE TO DESCRIBE

## 2017-08-28 NOTE — PLAN OF CARE
Problem: Goal Outcome Summary  Goal: Goal Outcome Summary  Outcome: No Change  Vital signs:  Temp: 99.1  F (37.3  C) Temp src: Oral BP: 119/51   Heart Rate: 92 Resp: 26 SpO2: 97 % O2 Device: Nasal cannula Oxygen Delivery: 2 LPM   Tmax 99.3, other VSS, capnography in place. C/O left hip pain, oxycodone administered x 1 with partial relief. Hip dressing C/D/I, abductor pillow in place. CMS intact. Denies nausea. Daniel in place with adequate output. Repositioned pt when he requests. Sleeping between cares.

## 2017-08-28 NOTE — PROGRESS NOTES
Ortho Daily Progress Note:  08/28/17      S: No acute events overnight. Pain well controlled.  Frustrated by abduction brace and inability to move. Numbness and tingling at baseline. Tolerating PO. Daniel in place.     O:    Intake/Output Summary (Last 24 hours) at 04/19/16 1708  Last data filed at 04/19/16 1200   Gross per 24 hour   Intake   1522 ml   Output    195 ml   Net   1327 ml     B/P: 119/51, T: 99.1, P: 77, R: 26      Exam:  Gen: No acute distress  Resp: Non-labored breathing  LLE: Sensation at baseline sp,dp,sural,saph,tibial  Firing ehl,fhl,ta,gastroc  Dp faint but palpable    Orders Only on 06/29/2017   Component Date Value Ref Range Status     CRP Inflammation 06/29/2017 <2.9  0.0 - 8.0 mg/L Final         Assessment/Plan: 72 year old male patient s/p L AGUEDA on  with  8/27/17. Doing well.     Activity: WBAT, posterior hip precautions, no need to continue abduction pillow after 24 hours  Antibiotics: 24 hours ancef post-operatively   Diet: Regular   DVT prophylaxis: Coumadin  Wound Care: Change aquacel POD 7  Pain management  Physical Therapy  Occupational Therapy  Disposition: Per trauma, pending pain control on PO and mobilization       Christina Chaudhry MD  PGY-2  Orthopaedic Surgery   [phone]      For questions regarding this patient, please page me at the above  number prior to attempting to contact the resident on call.

## 2017-08-28 NOTE — PROGRESS NOTES
[facility], [place]  Trauma Service Progress Note    Date of Service (when I saw the patient): 08/28/2017     Assessment & Plan   Trauma mechanism:Fall                   Time/date of injury: 8/25/2017, around 6pm                 Known Injuries:  1. Left femoral neck fracture  2. Left elbow bruising  Other diagnosis   1. Acute pain  2. Hypertension  3. Hyperlipidemia   4. Acute blood loss anemia   5. PAD s/p right leg stenting--on Plavix and ASA   6. Chronic lower extremity ulcers with MRSA  7. Diabetes Mellitus   8. Chronic kidney disease, stage 3  9. Tobacco dependence  Procedure: AGUEDA- 8/27 [place]   Plan:  1. Tertiary survey done 8/26, no new injuries identified. left elbow with some ecchymosis, imaging negative for fx though occult fx not ruled out.    2. Appreciate medicine team consult and medical management- signing off   3. DM II- Restarted lantus at half dose with sliding scale insulin correction. Will increase lantus as PO intake improves. Continue to hold oral anti glycemic medications.   4. HTN- Will continue to hold diuretics and antihypertensive agents and reassess in the am  5. Appreciate Orthopedics consult, AGUEDA done today with Dr Jackman.  Resume Plavix. Hold ASA given Plavix and Warfarin. Bridge with Lovenox until INR therapeutic.  Add Ca and Vit D. Posterior hip precautions, abductor pillow x 24 hours, WBAT, warfarin for DVT proph.    6. Acute blood loss anemia-  Hgb 11.5 on admission. Hgb 9.4 today. Will recheck in AM.  7. WOC consult for continued cares of RLE ulcerations. Per patient, he typically does daily wound cares with Gentamycin, topical silvadene and cover with dressing.   8. Pain control: oral and IV medications. Ice as needed for swelling of left hip.   9. Home medications: reviewed and appropriate medications resumed.   10. Physical and occupational therapy   11. Social work consult for discharge planning       Code status: Full Code      General Cares:  GI  Prophylaxis: none  DVT Prophylaxis: SCDs. Lovenox.  Warfarin   Date of last stool/Bowel Regimen:8/24/2017. Protocol ordered   Pulmonary toilet: IS, CDB     Code status: Full  Disposition: 7B,  Interval History   No acute events.  Pain well controlled in bed. Awaiting to work with therapies. Denies dyspnea, chest pain, palpitations, dizziness, vomiting.   ROS x 8 negative with exception of those things listed in interval hx    Physical Exam   Temp: 99.7  F (37.6  C) Temp src: Oral BP: 123/55 Pulse: 92 Heart Rate: 92 Resp: 24 SpO2: 97 % O2 Device: Nasal cannula Oxygen Delivery: 2 LPM  Vitals:    08/24/17 2120 08/25/17 0234   Weight: 82.1 kg (181 lb) 89.9 kg (198 lb 1.6 oz)     Vital Signs with Ranges  Temp:  [97.8  F (36.6  C)-99.7  F (37.6  C)] 99.7  F (37.6  C)  Pulse:  [92] 92  Heart Rate:  [84-98] 92  Resp:  [14-26] 24  BP: (117-159)/(47-68) 123/55  FiO2 (%):  [33 %-96 %] 96 %  SpO2:  [85 %-100 %] 97 %  I/O last 3 completed shifts:  In: 1925 [P.O.:240; I.V.:1685]  Out: 2100 [Urine:1800; Blood:300]    Shayan Coma Scale - Total 15/15    Constitutional: Awake, alert, cooperative, no apparent distress  Eyes: Lids and lashes normal, pupils equal, round and reactive to light, extra ocular muscles intact, sclera clear, conjunctiva normal.  ENT: Normocephalic, atraumatic  Respiratory: No increased work of breathing, good air exchange, clear to auscultation bilaterally, no crackles or wheezing.  Cardiovascular:  regular rate and rhythm, normal S1 and S2,  and no murmur noted.  GI: Normal bowel sounds, soft, non-distended, non-tender, no guarding  Genitourinary:  Yellow and clear, menjivar  Skin:  pale skin color, no redness, warmth, or swelling, no ecchymosis, no abrasions, and no jaundice. Incision covered, CDI  Musculoskeletal: There is no redness, warmth, or swelling of the joints.  Pedal pulse palpated  Neurologic: Awake, alert, oriented.  Cranial nerves II-XII are grossly intact.   No focal deficits  Neuropsychiatric:  Calm, normal eye contact, alert, affect appropriate to situation, oriented, thought process normal.    Rik Saleh NP  To contact the trauma service use job code pager [phone],   Numeric texts or alpha text through [facility]

## 2017-08-28 NOTE — PROGRESS NOTES
"       Interval History:   He has been doing well.  He has been frustrated by inability to be seen by PT up to this point.  Otherwise he has only minimal hip pain in the lateral and posterior aspect of his hip.  He has no CP/SOB/nausea.         Physical Exam:     Vital Signs:  /65 (BP Location: Left arm)  Pulse 88  Temp 99.7  F (37.6  C) (Oral)  Resp 22  Ht 1.88 m (6' 2\")  Wt 89.9 kg (198 lb 1.6 oz)  SpO2 98%  BMI 25.43 kg/m2    Intake/Output Summary (Last 24 hours) at 08/27/17 1209  Last data filed at 08/27/17 1205   Gross per 24 hour   Intake             1890 ml   Output             1850 ml   Net               40 ml     Gen:    No acute distress. Alert.  Pulm:  Non-labored breathing  Incision:   Dressing c/d/i    Operative extremity:   Motor: ehl/fhl/gs/at intact  Sensory: intact light touch dp/sp/t/s/s      Labs:  CBC    Recent Labs  Lab 08/28/17  0753 08/24/17  2131   WBC 8.1 8.0   HGB 9.4* 11.5*   * 175       Imaging: post operative AGUEDA films reviewed showing implants in good position and well fixed.       Assessment and Plan:    Amos Walker is a 72 year old male who is s/p Left AGUEDA for femoral neck fracture on 8/27/2017     Activity: Up with assist.  Posterior hip precautions.  Gait aids PRN.  OOB to chair for all meals.  Weight bearing status: WBAT  Antibiotics/Tetanus: Routine post operative antibiotics x 2 doses.  Diet: Begin with clear fluids and progress diet as tolerated.  DVT prophylaxis: SCDs while in hospital, resume home plavix.  Bracing/Splinting: No brace, pillow, or splint needed.  Ice: Ice operative site 20 minutes >4 times daily.  Wound Care: Leave Aquacel in place until POD10, then remove and okay to keep off.    Drains: No drain.  Pain management: transition from IV to orals as tolerated.   Physical Therapy/Occupational Therapy: WB and precautions noted above, gait transfers, ROM, ADL's.  I discussed with the physical therapy cornea and the importance of Amos being seen " today by physical therapy to begin working on ambulation and gait training. I personally worked with Amos and helped him set up to the bedside and then stand up. During that encounter he noted that he felt a little uncomfortable walking. However we did stand up for a few minutes. He tolerated this well.    Follow-up: Clinic with Dr. Jackman around 2 weeks postoperatively for wound check and xrays as needed.    Disposition: Pending progress with therapies, pain control on orals, and medical stability, anticipate discharge to SNF on POD #3.

## 2017-08-28 NOTE — PLAN OF CARE
Problem: Goal Outcome Summary  Goal: Goal Outcome Summary  Outcome: No Change  T-max 99.7. OVSS. RA. Scheduled tylenol for pain. Using IS frequently with encouragement. Tolerating diet. Abduction pillow and capno removed at ~14:30 per orders. WBAT to LLE. Has not been OOB. L hip dressing c/d/i. CMS intact. Pulses present. Daniel catheter, adequate UOP. OT scheduled to see pt today for eval. Continue POC.

## 2017-08-28 NOTE — PLAN OF CARE
Problem: Goal Outcome Summary  Goal: Goal Outcome Summary  OT 7B: OT eval and treatment initiated. Pt educated on posterior hip precautions to LLE and implications/modifications for daily activities to ensure adherence and safety. Written handout provided. Pt supine > sitting EOB with min A for LEs and mod A for UB. Min-mod A x 2 for STS and pivot transfer to chair with VCs for adherence to hip precautions. Pt motivated but limited by pain, surgical precautions, and generalized weakness. Rec: TCU to progress strength, endurance, and safety with mobility and ADLs.

## 2017-08-28 NOTE — PLAN OF CARE
Problem: Goal Outcome Summary  Goal: Goal Outcome Summary  Outcome: No Change  [id number]  VSS, desats to 85 % on RA, 2 L/NC applied, sats are upper 90's, capnography intact. A&O x 4, denies much pain- scheduled tylenol and robaxin given. Left leg incision dressing CDI. Abductor pillow between legs. Repositioned pt q 2 hours. Urine output adequate via menjivar. BG at bedtime 267- lantus and sliding scale coverage given. Pt requested sleeping pill, paged provider, provider doesn't want to order any sleeping pills at this time. Pt updated.  Continuing to monitor per POC.

## 2017-08-29 ENCOUNTER — APPOINTMENT (OUTPATIENT)
Dept: OCCUPATIONAL THERAPY | Facility: CLINIC | Age: 72
DRG: 470 | End: 2017-08-29
Payer: MEDICARE

## 2017-08-29 ENCOUNTER — APPOINTMENT (OUTPATIENT)
Dept: PHYSICAL THERAPY | Facility: CLINIC | Age: 72
DRG: 470 | End: 2017-08-29
Attending: NURSE PRACTITIONER
Payer: MEDICARE

## 2017-08-29 LAB
ANION GAP SERPL CALCULATED.3IONS-SCNC: 8 MMOL/L (ref 3–14)
BLD PROD TYP BPU: NORMAL
BLD PROD TYP BPU: NORMAL
BLD UNIT ID BPU: 0
BLD UNIT ID BPU: 0
BLOOD PRODUCT CODE: NORMAL
BLOOD PRODUCT CODE: NORMAL
BPU ID: NORMAL
BPU ID: NORMAL
BUN SERPL-MCNC: 22 MG/DL (ref 7–30)
CALCIUM SERPL-MCNC: 7.8 MG/DL (ref 8.5–10.1)
CHLORIDE SERPL-SCNC: 101 MMOL/L (ref 94–109)
CO2 SERPL-SCNC: 24 MMOL/L (ref 20–32)
CREAT SERPL-MCNC: 1.12 MG/DL (ref 0.66–1.25)
ERYTHROCYTE [DISTWIDTH] IN BLOOD BY AUTOMATED COUNT: 12.7 % (ref 10–15)
GFR SERPL CREATININE-BSD FRML MDRD: 65 ML/MIN/1.7M2
GLUCOSE BLDC GLUCOMTR-MCNC: 157 MG/DL (ref 70–99)
GLUCOSE BLDC GLUCOMTR-MCNC: 178 MG/DL (ref 70–99)
GLUCOSE BLDC GLUCOMTR-MCNC: 209 MG/DL (ref 70–99)
GLUCOSE BLDC GLUCOMTR-MCNC: 230 MG/DL (ref 70–99)
GLUCOSE BLDC GLUCOMTR-MCNC: 236 MG/DL (ref 70–99)
GLUCOSE BLDC GLUCOMTR-MCNC: 294 MG/DL (ref 70–99)
GLUCOSE SERPL-MCNC: 153 MG/DL (ref 70–99)
HCT VFR BLD AUTO: 26.1 % (ref 40–53)
HGB BLD-MCNC: 8.8 G/DL (ref 13.3–17.7)
INR PPP: 1.91 (ref 0.86–1.14)
MCH RBC QN AUTO: 30.9 PG (ref 26.5–33)
MCHC RBC AUTO-ENTMCNC: 33.7 G/DL (ref 31.5–36.5)
MCV RBC AUTO: 92 FL (ref 78–100)
PLATELET # BLD AUTO: 135 10E9/L (ref 150–450)
POTASSIUM SERPL-SCNC: 3.9 MMOL/L (ref 3.4–5.3)
RBC # BLD AUTO: 2.85 10E12/L (ref 4.4–5.9)
SODIUM SERPL-SCNC: 133 MMOL/L (ref 133–144)
TRANSFUSION STATUS PATIENT QL: NORMAL
WBC # BLD AUTO: 7.4 10E9/L (ref 4–11)

## 2017-08-29 PROCEDURE — 36415 COLL VENOUS BLD VENIPUNCTURE: CPT | Performed by: ORTHOPAEDIC SURGERY

## 2017-08-29 PROCEDURE — 97116 GAIT TRAINING THERAPY: CPT | Mod: GP | Performed by: PHYSICAL THERAPIST

## 2017-08-29 PROCEDURE — 12000008 ZZH R&B INTERMEDIATE UMMC

## 2017-08-29 PROCEDURE — 40000193 ZZH STATISTIC PT WARD VISIT: Performed by: PHYSICAL THERAPIST

## 2017-08-29 PROCEDURE — A9270 NON-COVERED ITEM OR SERVICE: HCPCS | Mod: GY | Performed by: SURGERY

## 2017-08-29 PROCEDURE — 97530 THERAPEUTIC ACTIVITIES: CPT | Mod: GP | Performed by: PHYSICAL THERAPIST

## 2017-08-29 PROCEDURE — A9270 NON-COVERED ITEM OR SERVICE: HCPCS | Mod: GY | Performed by: NURSE PRACTITIONER

## 2017-08-29 PROCEDURE — A9270 NON-COVERED ITEM OR SERVICE: HCPCS | Mod: GY | Performed by: PHYSICIAN ASSISTANT

## 2017-08-29 PROCEDURE — 97110 THERAPEUTIC EXERCISES: CPT | Mod: GP | Performed by: PHYSICAL THERAPIST

## 2017-08-29 PROCEDURE — 97535 SELF CARE MNGMENT TRAINING: CPT | Mod: GO | Performed by: OCCUPATIONAL THERAPIST

## 2017-08-29 PROCEDURE — 25000132 ZZH RX MED GY IP 250 OP 250 PS 637: Mod: GY | Performed by: SURGERY

## 2017-08-29 PROCEDURE — 25000132 ZZH RX MED GY IP 250 OP 250 PS 637: Mod: GY | Performed by: NURSE PRACTITIONER

## 2017-08-29 PROCEDURE — 25000132 ZZH RX MED GY IP 250 OP 250 PS 637: Mod: GY | Performed by: PHYSICIAN ASSISTANT

## 2017-08-29 PROCEDURE — 00000146 ZZHCL STATISTIC GLUCOSE BY METER IP

## 2017-08-29 PROCEDURE — 40000133 ZZH STATISTIC OT WARD VISIT: Performed by: OCCUPATIONAL THERAPIST

## 2017-08-29 PROCEDURE — 97161 PT EVAL LOW COMPLEX 20 MIN: CPT | Mod: GP | Performed by: PHYSICAL THERAPIST

## 2017-08-29 PROCEDURE — 80048 BASIC METABOLIC PNL TOTAL CA: CPT | Performed by: ORTHOPAEDIC SURGERY

## 2017-08-29 PROCEDURE — 85610 PROTHROMBIN TIME: CPT | Performed by: ORTHOPAEDIC SURGERY

## 2017-08-29 PROCEDURE — 25000128 H RX IP 250 OP 636: Performed by: NURSE PRACTITIONER

## 2017-08-29 PROCEDURE — 85027 COMPLETE CBC AUTOMATED: CPT | Performed by: ORTHOPAEDIC SURGERY

## 2017-08-29 RX ORDER — WARFARIN SODIUM 1 MG/1
4 TABLET ORAL
Status: COMPLETED | OUTPATIENT
Start: 2017-08-29 | End: 2017-08-29

## 2017-08-29 RX ORDER — OXYCODONE HYDROCHLORIDE 5 MG/1
5-10 TABLET ORAL
Status: DISCONTINUED | OUTPATIENT
Start: 2017-08-29 | End: 2017-08-30 | Stop reason: HOSPADM

## 2017-08-29 RX ORDER — METHOCARBAMOL 750 MG/1
750 TABLET, FILM COATED ORAL 4 TIMES DAILY
Status: DISCONTINUED | OUTPATIENT
Start: 2017-08-29 | End: 2017-08-30 | Stop reason: HOSPADM

## 2017-08-29 RX ADMIN — OXYCODONE HYDROCHLORIDE 5 MG: 5 TABLET ORAL at 12:20

## 2017-08-29 RX ADMIN — FERROUS SULFATE TAB 325 MG (65 MG ELEMENTAL FE) 325 MG: 325 (65 FE) TAB at 20:12

## 2017-08-29 RX ADMIN — SIMVASTATIN 10 MG: 10 TABLET, FILM COATED ORAL at 22:24

## 2017-08-29 RX ADMIN — VITAMIN D, TAB 1000IU (100/BT) 3000 UNITS: 25 TAB at 09:05

## 2017-08-29 RX ADMIN — CLOPIDOGREL 75 MG: 75 TABLET, FILM COATED ORAL at 09:04

## 2017-08-29 RX ADMIN — ENOXAPARIN SODIUM 40 MG: 40 INJECTION SUBCUTANEOUS at 09:03

## 2017-08-29 RX ADMIN — SENNOSIDES AND DOCUSATE SODIUM 2 TABLET: 8.6; 5 TABLET ORAL at 20:12

## 2017-08-29 RX ADMIN — OXYCODONE HYDROCHLORIDE AND ACETAMINOPHEN 1000 MG: 500 TABLET ORAL at 09:05

## 2017-08-29 RX ADMIN — METHOCARBAMOL 750 MG: 750 TABLET ORAL at 20:12

## 2017-08-29 RX ADMIN — INSULIN ASPART 3 UNITS: 100 INJECTION, SOLUTION INTRAVENOUS; SUBCUTANEOUS at 11:49

## 2017-08-29 RX ADMIN — ACETAMINOPHEN 1000 MG: 500 TABLET, FILM COATED ORAL at 00:31

## 2017-08-29 RX ADMIN — METHOCARBAMOL 750 MG: 750 TABLET ORAL at 11:50

## 2017-08-29 RX ADMIN — ACETAMINOPHEN 1000 MG: 500 TABLET, FILM COATED ORAL at 14:12

## 2017-08-29 RX ADMIN — GENTAMICIN SULFATE: 1 CREAM TOPICAL at 09:03

## 2017-08-29 RX ADMIN — OXYCODONE HYDROCHLORIDE 10 MG: 5 TABLET ORAL at 22:24

## 2017-08-29 RX ADMIN — SENNOSIDES AND DOCUSATE SODIUM 2 TABLET: 8.6; 5 TABLET ORAL at 09:04

## 2017-08-29 RX ADMIN — OXYCODONE HYDROCHLORIDE 10 MG: 5 TABLET ORAL at 18:02

## 2017-08-29 RX ADMIN — FERROUS SULFATE TAB 325 MG (65 MG ELEMENTAL FE) 325 MG: 325 (65 FE) TAB at 09:11

## 2017-08-29 RX ADMIN — POLYETHYLENE GLYCOL 3350 17 G: 17 POWDER, FOR SOLUTION ORAL at 09:03

## 2017-08-29 RX ADMIN — INSULIN ASPART 2 UNITS: 100 INJECTION, SOLUTION INTRAVENOUS; SUBCUTANEOUS at 16:45

## 2017-08-29 RX ADMIN — ACETAMINOPHEN 1000 MG: 500 TABLET, FILM COATED ORAL at 09:05

## 2017-08-29 RX ADMIN — INSULIN ASPART 1 UNITS: 100 INJECTION, SOLUTION INTRAVENOUS; SUBCUTANEOUS at 09:06

## 2017-08-29 RX ADMIN — SILVER SULFADIAZINE: 10 CREAM TOPICAL at 09:04

## 2017-08-29 RX ADMIN — METHOCARBAMOL 750 MG: 750 TABLET ORAL at 16:45

## 2017-08-29 RX ADMIN — CALCIUM 1250 MG: 500 TABLET ORAL at 18:02

## 2017-08-29 RX ADMIN — CALCIUM 1250 MG: 500 TABLET ORAL at 09:05

## 2017-08-29 RX ADMIN — WARFARIN SODIUM 4 MG: 1 TABLET ORAL at 18:02

## 2017-08-29 NOTE — PROGRESS NOTES
Social Work Services Progress Note    Hospital Day: 6  Date of Initial Social Work Evaluation:  8/25/17- please see for details  Collaborated with:  Chart review, team rounds, pt, pt's wife Danielle, nursing facilities, [facility] admissions    Data:  Pt is being followed for TCU placement after sustaining a hip fracture. Pt is POD 2 and OT is currently recommending TCU and PT has yet to eval pt.     Intervention:  Sw met with pt and pt's wife to discuss d/c planning. Pt reported he is doing better aside from pain and was in good spirits. Pt reported he defers to his wife for this type of planning. Sw explained TCU recommendations and cares vs ARU and explained to pt and Danielle that pt does not meet ARU criteria. Sw also explained general insurance coverage for TCU and that medical transport would be an out of pocket expense. Danielle requested that medical transport be arranged at d/c.  Danielle expressed concerns about the set up of family home with stairs and pt maybe needing to maneuver with a walker once done at TCU and asked for a home safety eval for d/c to home. Danielle mentioned there is the option of putting a bed on the main level somewhere for pt if needed and there is a bathroom on the main level. Sw explained that sw at TCU would help with d/c plan from TCU and helping arrange any needed services or equipment. Sw encouraged Danielle to raise any concerns or questions right away upon admission to TCU so these can be addressed sooner rather than later.    Medicare list of facilities was provided and pt and his wife [facility].     Delmis contacted the following TCUs to initiate referrals:  - [facility] (p. [phone], f. [phone], has access to Epic)- spoke with Kerry in admissions and she anticipates bed availability for Wednesday and will review pt in Epic and watch for PT note to be entered. Kerry called back reporting pt  has been accepted and can arrive anytime Wednesday if he is ready.   - [facility] (p. [phone], f. [phone])- spoke with Christina in admissions and she anticipated bed availability Wednesday and will review, referral faxed and waiting to hear back.     Thomas spoke with Coreen with [facility] admissions and was told that pt does not meet ARU criteria as pt does not have the medical complexity required.     Assessment:  TCU, see bedside RN, PT/OT, medical team notes    Plan:    Anticipated Disposition:  Facility:  TCU, location to be determined, accepted at [facility]    Barriers to d/c plan:  Medical readiness, bed availability/acceptance    Follow Up:  thomas WINSLOW will continue to follow for d/c planning    VALENTÍN Mckeon, MSW  7B   [phone] (pager) [phone]  8/29/2017

## 2017-08-29 NOTE — PLAN OF CARE
Problem: Fracture Orthopaedic (Adult)  Goal: Signs and Symptoms of Listed Potential Problems Will be Absent or Manageable (Fracture Orthopaedic)  Signs and symptoms of listed potential problems will be absent or manageable by discharge/transition of care (reference Fracture Orthopaedic (Adult) CPG).  Outcome: No Change  BP soft, other VSS on RA. Pt c/o pain with activity, prn oxycodone with scheduled tylenol, ice packs given. Heavy assist of 2 w/w to pivot, pt requires reminders on moving queues. Daniel removed at 1630, pt due to void, educated. Attempted void this evening, 50 mL output; bladder scanned for 72 mL with heavy encouragement of PO fluids. Able to void additional 175 mL with effort. PIV SL. On reg diet, tolerating with good appetite. BG this shift 251 and 332 with SSI, HS lantus given. Dressing to L hip CDI. R shin dressing with moderate amount of serosanguinous drainage, refused dressing change until HS cares. [place] changed, currently CDI. +1 pedal pulses bilaterally, pt reports baseline numbness/tingling. [place]. Alert and oriented, able to make needs known. Continue to monitor.

## 2017-08-29 NOTE — PROGRESS NOTES
08/29/17 1418   Quick Adds   Type of Visit Initial PT Evaluation   Living Environment   Lives With child(daisha), adult;spouse   Living Arrangements house   Number of Stairs to Enter Home 3   Number of Stairs Within Home 26   Stair Railings at Home inside, present on right side   Transportation Available family or friend will provide   Living Environment Comment upstairs bedroom/shower, adult children are able to assist once home as needed   Self-Care   Dominant Hand right   Usual Activity Tolerance good   Current Activity Tolerance fair   Regular Exercise yes   Activity/Exercise Type walking   Exercise Amount/Frequency 30 mins   Equipment Currently Used at Home none   Activity/Exercise/Self-Care Comment walks 20-40 min daily; no AD used most recently prior to admission, has used cane in past at times, reports shuffling gait in past and L sided low back pain that he feels has contributed to his fall. Pt indicates he has had to sit in a chair with his wallet underneath his L hip/glut due to prior pain.   Functional Level Prior   Ambulation 0-->independent   Transferring 0-->independent   Toileting 0-->independent   Bathing 0-->independent   Dressing 0-->independent   Eating 0-->independent   Communication 0-->understands/communicates without difficulty   Swallowing 0-->swallows foods/liquids without difficulty   Cognition 0 - no cognition issues reported   Fall history within last six months yes   Number of times patient has fallen within last six months 3   Which of the above functional risks had a recent onset or change? ambulation;transferring;fall history   Prior Functional Level Comment Fully IND at baseline   General Information   Onset of Illness/Injury or Date of Surgery - Date 08/24/17   Referring Physician Delfino, Rik Clement NP   Patient/Family Goals Statement To recover and avoid another fall.    Pertinent History of Current Problem (include personal factors and/or comorbidities that impact the POC) Pt is  "a 72 year old male who sustained a L femoral neck fx. Patient now presents s/p L AGUEDA with Dr. Jackman on 8/27/17.  (L posterior hip precautions; no hip abd pillow after 24 hrs)   Precautions/Limitations (activity, up with assist)   Weight-Bearing Status - LLE weight-bearing as tolerated   Cognitive Status Examination   Level of Consciousness alert   Follows Commands and Answers Questions 100% of the time   Personal Safety and Judgment intact   Cognitive Comment pt able to recall teaching on precautions from prior staff   Pain Assessment   Patient Currently in Pain Yes, see Vital Sign flowsheet  (further with standing, WB)   Posture    Posture Comments reduced upright posture, prone to fwd head position with standing even with walker adjusted to correct height   Range of Motion (ROM)   ROM Comment less than full L hip AROM post-op, impacted by pain and precautions, other ROM WFL   Strength   Strength Comments reduced L hip strength post-op, performs SLR IND ~1-2\" L LE, requires assist to lift up onto bedside   Bed Mobility   Bed Mobility Comments MODA x 2 sit>supine, able to control scooting into bed with minimal support to upper body   Transfer Skills   Transfer Comments MODA x 2 sit<>stand, reduced stability sit<>stand, needs ~ 5-6 sec to adjust balance with walker once standing, WBAT LLE   Gait   Gait Comments Ambulates with step-to gait, reduced L stance time, weight shift and stability with FWW use, CGA-LIZ of 1, WBAT L LE   Balance   Balance Comments reduced stability sit<>stand, reduced static and dynamic standing balance; see above   Coordination   Coordination Comments intact basic coordination of UEs and LEs   General Therapy Interventions   Planned Therapy Interventions balance training;bed mobility training;gait training;ROM;strengthening;stretching;transfer training;home program guidelines;progressive activity/exercise   Clinical Impression   Criteria for Skilled Therapeutic Intervention yes, treatment " "indicated   PT Diagnosis weakness following fall, L AGUEDA   Influenced by the following impairments reduced ROM, strength, balance, safety   Functional limitations due to impairments below baseline bed mobility, transfers, gait   Clinical Presentation Evolving/Changing   Clinical Presentation Rationale pain, WB tolerance, fatigue variable   Clinical Decision Making (Complexity) Low complexity   Therapy Frequency` daily   Predicted Duration of Therapy Intervention (days/wks) 1 week   Anticipated Discharge Disposition Transitional Care Facility   Risk & Benefits of therapy have been explained Yes   Patient, Family & other staff in agreement with plan of care Yes   [facility] TM \"6 Clicks\"   2016, Trustees of [facility], under license to [employer].  All rights reserved.   6 Clicks Short Forms Basic Mobility Inpatient Short Form   [facility]  \"6 Clicks\" V.2 Basic Mobility Inpatient Short Form   1. Turning from your back to your side while in a flat bed without using bedrails? 3 - A Little   2. Moving from lying on your back to sitting on the side of a flat bed without using bedrails? 2 - A Lot   3. Moving to and from a bed to a chair (including a wheelchair)? 2 - A Lot   4. Standing up from a chair using your arms (e.g., wheelchair, or bedside chair)? 2 - A Lot   5. To walk in hospital room? 3 - A Little   6. Climbing 3-5 steps with a railing? 1 - Total   Basic Mobility Raw Score (Score out of 24.Lower scores equate to lower levels of function) 13   Total Evaluation Time   Total Evaluation Time (Minutes) 10     "

## 2017-08-29 NOTE — PROGRESS NOTES
"[facility], [place]  Trauma Education Note    Date of Service: 08/29/2017     Trauma Mechanism: Fall  Known Injuries:  1. Left femoral neck fracture  2. Left elbow bruising    Assessment:    Education Needs:  Post op discharge instructions for hip surgery, fall prevention, osteoporosis in men, s/sx of complications, need for follow up and rehab.      Primary Learner: Patient    Other Learner(s): Spouse    Barriers: None    Learning Style: Explain, show and handouts.       Plan:    Education provided: Post op hip surgery dc instructions, wound care, s/sx of complications, need for follow up and for rehab.    Trauma Mechanism: Fall prevention related to AMANDA, need to slow down, walk for time, note mileage. Much time spent on preventing falls post op. Discussed ways to remind himself to  his feet, be careful with the walker, keeping open paths in house, and other check list ideas.    Treatment Plan Education: Discussed the need for rehab, pain management and bowel care.  Handouts provided: [employer] \"Safe From Falls\", \"Men and Osteoporosis\", Noah \"post hip surgery\" handouts.  Follow up Education Needs: Reinforce as needed.    Elizabeth Juan  "

## 2017-08-29 NOTE — PLAN OF CARE
Problem: Individualization  Goal: Patient Preferences  Outcome: No Change  Vitals:     08/28/17 1616 08/28/17 2017 08/28/17 2203 08/29/17 0400   BP: 105/46 117/59 146/71 125/60   BP Location: Left arm Left arm Left arm     Pulse:           Resp: 20 20 20 16   Temp: 98.9  F (37.2  C) 98.7  F (37.1  C) 99.7  F (37.6  C) 99.4  F (37.4  C)   TempSrc: Oral Oral Oral Oral   SpO2: 96% 98% 96% 95%   Weight:           Height:           Patient with lft hip dressing CDI.  CMS intact (patient has baseline neuropathy), using pillow between legs.  Lungs clear, diminished in bases, using IS.  Has dressing over chronic rt leg ulcer-some drainage seen on dressing.  Abd soft with positive bowel sounds, no bm this shift.  Voided x2 for 250cc.  Declined offer of pain med other then tylenol.  [place], pocket talker brought to room to assist in hearing.  Glc 178.   Continue with POC.

## 2017-08-29 NOTE — PROGRESS NOTES
Ortho Daily Progress Note:  08/29/17      S: No acute events overnight. Pain well controlled. More pain than he expected; particularly when trying to get up. Numbness and tingling at baseline. Tolerating PO. Voiding spontaneously.      O:    Intake/Output Summary (Last 24 hours) at 04/19/16 1708  Last data filed at 04/19/16 1200   Gross per 24 hour   Intake   1522 ml   Output    195 ml   Net   1327 ml     B/P: 119/51, T: 99.1, P: 77, R: 26      Exam:  Gen: No acute distress  Resp: Non-labored breathing  LLE: Sensation at baseline sp,dp,sural,saph,tibial  Firing ehl,fhl,ta,gastroc  Dp faint but palpable    Orders Only on 06/29/2017   Component Date Value Ref Range Status     CRP Inflammation 06/29/2017 <2.9  0.0 - 8.0 mg/L Final         Assessment/Plan: 72 year old male patient s/p L AGUEDA on  with  8/27/17. Doing well.     Activity: WBAT, posterior hip precautions, no need to continue abduction pillow after 24 hours  Antibiotics: 24 hours ancef post-operatively   Diet: Regular   DVT prophylaxis: Coumadin  Wound Care: Change aquacel POD 7  Pain management  Physical Therapy  Occupational Therapy  Disposition: Per trauma, pending pain control on PO and mobilization       Christina Chaudhry MD  PGY-2  Orthopaedic Surgery   [phone]      For questions regarding this patient, please page me at the above  number prior to attempting to contact the resident on call.

## 2017-08-29 NOTE — PLAN OF CARE
"Problem: Goal Outcome Summary  Goal: Goal Outcome Summary  PT 7B: PT eval complete and treatment provided. Post-op ex initiated and pt educated on safe mobilization post-op given L posterior hip precautions. Given pt's height (6'2\") pillows placed on chairs to ensure 90 deg precaution (keeping knee no higher than hip when sitting) maintained. Pt required up to MODA to stand up due to reduced strength and L LE WB. Pt ambulated 25 ft x 2 with FWW, WBAT L LE provided LIZ x 1 and close WC follow. MODA x 2 required for safe sit>supine.      Recommend TCU stay. Pt presents far below prior functional baseline and has good motivation and participation for PT to progress well in a TCU setting.       "

## 2017-08-29 NOTE — PLAN OF CARE
"Problem: Goal Outcome Summary  Goal: Goal Outcome Summary  Outcome: Improving  /60 (BP Location: Left arm)  Pulse 92  Temp 99  F (37.2  C) (Axillary)  Resp 16  Ht 1.88 m (6' 2\")  Wt 89.9 kg (198 lb 1.6 oz)  SpO2 96%  BMI 25.43 kg/m2   Afebrile, pain is controlled with oxy 5mg and tyl, PT and OT came to see pt, OOB, up in the chair, +BS, passing flatus, good appetite, insulin given as per orders, cms is intact, wound dressing done as per orders. Voiding adequately, cont with POC      "

## 2017-08-29 NOTE — PROGRESS NOTES
" 08/28/17 1533   Quick Adds   Type of Visit Initial Occupational Therapy Evaluation   Living Environment   Lives With spouse;child(daisha), adult   Living Arrangements house   Home Accessibility stairs to enter home;bed and bath are not on the first floor;stairs within home   Number of Stairs to Enter Home 3   Number of Stairs Within Home 26  (13 up and 13 down)   Stair Railings at Home outside, present on right side;inside, present on right side   Transportation Available family or friend will provide   Living Environment Comment Pt reports bedroom and shower (walk-in) are on upper level. 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 Has used cane in the past for cervical spine injury but not using an AD PTA. Pt reports \"dragging feet\" with ambulation, causing patient to trip and fall.   Functional Level Prior   Ambulation 0-->independent   Transferring 0-->independent   Toileting 0-->independent   Bathing 0-->independent   Dressing 0-->independent   Eating 0-->independent   Communication 0-->understands/communicates without difficulty   Swallowing 0-->swallows foods/liquids without difficulty   Cognition 0 - no cognition issues reported   Fall history within last six months yes   Number of times patient has fallen within last six months 3   Which of the above functional risks had a recent onset or change? transferring;ambulation;bathing;dressing   Prior Functional Level Comment Pt independent per PLOF.   General Information   Onset of Illness/Injury or Date of Surgery - Date 08/24/17   Referring Physician Rik Saleh NP   Patient/Family Goals Statement none stated " "  Additional Occupational Profile Info/Pertinent History of Current Problem \" 72 year old male who is s/p Left AGUEDA for femoral neck fracture on 8/27/2017 \"   Precautions/Limitations left hip precautions;fall precautions   Weight-Bearing Status - LUE full weight-bearing   Weight-Bearing Status - RUE full weight-bearing   Weight-Bearing Status - LLE weight-bearing as tolerated   Weight-Bearing Status - RLE full weight-bearing   General Observations Pt [place]    General Info Comments Activity: up with assist   Cognitive Status Examination   Orientation orientation to person, place and time   Cognitive Comment No cognitive concerns noted; able to follow commands appropriately.    Visual Perception   Visual Perception No deficits were identified;Wears glasses   Sensory Examination   Sensory Quick Adds No deficits were identified   Integumentary/Edema   Integumentary/Edema no deficits were identifed   Posture   Posture not impaired   Strength   Strength Comments Not tested but at least 4/5 BUT MMG given ability to heavily bear weight into BUEs with standing   Mobility   Bed Mobility Bed mobility skill: Supine to sit   Bed Mobility Skill: Supine to Sit   Level of [place]: Supine/Sit moderate assist (50% patients effort)   Physical Assist/Nonphysical Assist: Supine/Sit 1 person assist;verbal cues   Assistive Device: Supine/Sit bedrail   Transfer Skills   Transfer Transfer Safety Analysis Bed/Chair   Transfer Skill: Bed to Chair/Chair to Bed   Level of [place]: Bed to Chair moderate assist (50% patients effort)   Physical Assist/Nonphysical Assist: Bed to Chair 2 persons;verbal cues   Weight-Bearing Restrictions weight-bearing as tolerated  (LLE)   Assistive Device - Transfer Skill Bed to Chair Chair to Bed Rehab Eval standard walker   Transfer Skill: Sit to Stand   Level of [place]: Sit/Stand moderate assist (50% patients effort)   Physical Assist/Nonphysical Assist: Sit/Stand verbal cues   Transfer Skill: Sit " "to Stand weight-bearing as tolerated  (LLE)   Assistive Device for Transfer: Sit/Stand standard walker   Lower Body Dressing   Level of [place]: Dress Lower Body dependent (less than 25% patients effort)   Instrumental Activities of Daily Living (IADL)   Previous Responsibilities housekeeping;laundry;shopping;finances;medication management;driving   Activities of Daily Living Analysis   Impairments Contributing to Impaired Activities of Daily Living fear and anxiety;pain;post surgical precautions;ROM decreased;strength decreased   General Therapy Interventions   Planned Therapy Interventions ADL retraining;IADL retraining;strengthening;transfer training;home program guidelines;progressive activity/exercise;risk factor education   Clinical Impression   Criteria for Skilled Therapeutic Interventions Met yes, treatment indicated   OT Diagnosis decreased independence in I/ADLs s/p AGUEDA for L femur fracture   Influenced by the following impairments surgical precautions, generalized weakness, pain, fear   Assessment of Occupational Performance 3-5 Performance Deficits   Identified Performance Deficits dressing, bathing, functional transfers and functional mobility, home management   Clinical Decision Making (Complexity) Low complexity   Therapy Frequency daily   Predicted Duration of Therapy Intervention (days/wks) 2 weeks   Anticipated Equipment Needs at Discharge dressing equipment;bath sponge   Anticipated Discharge Disposition Transitional Care Facility   Risks and Benefits of Treatment have been explained. Yes   Patient, Family & other staff in agreement with plan of care Yes   [facility] [employer] TM \"6 Clicks\"   2016, Trustees of [facility], under license to [employer].  All rights reserved.   6 Clicks Short Forms Daily Activity Inpatient Short Form   [facility] AM-PAC  \"6 Clicks\" Daily Activity Inpatient Short Form   1. Putting on and taking off regular lower body clothing? 2 - A Lot   2. " Bathing (including washing, rinsing, drying)? 2 - A Lot   3. Toileting, which includes using toilet, bedpan or urinal? 3 - A Little   4. Putting on and taking off regular upper body clothing? 3 - A Little   5. Taking care of personal grooming such as brushing teeth? 4 - None   6. Eating meals? 4 - None   Daily Activity Raw Score (Score out of 24.Lower scores equate to lower levels of function) 18   Total Evaluation Time   Total Evaluation Time (Minutes) 8

## 2017-08-29 NOTE — PLAN OF CARE
Problem: Goal Outcome Summary  Goal: Goal Outcome Summary  OT 7B: pt. Min A X 1-2 with BADLs and functional transf.s; amb. with FWW bed-chair with minAX 1-2.  Pt. aware of and maintaining post op precautions throughout session.  Recommend d/c to rehab; TCU to max. safety/indep. with ADL/IADLs and functional mobility.

## 2017-08-29 NOTE — PROGRESS NOTES
[facility], [place]  Trauma Service Progress Note    Date of Service (when I saw the patient): 08/29/2017     Assessment & Plan   Trauma mechanism:Fall                   Time/date of injury: 8/25/2017, around 6pm                 Known Injuries:  1. Left femoral neck fracture  2. Left elbow bruising  Other diagnosis   1. Acute pain  2. Hypertension  3. Hyperlipidemia   4. Acute blood loss anemia   5. PAD s/p right leg stenting--on Plavix and ASA   6. Chronic lower extremity ulcers with MRSA  7. Diabetes Mellitus   8. Chronic kidney disease, stage 3  9. Tobacco dependence  Procedure: AGUEDA- 8/27 [place]   Plan:  1. Tertiary survey done 8/26, no new injuries identified. left elbow with some ecchymosis, imaging negative for fx though occult fx not ruled out.     2. DM II- Restarted lantus at home dose with sliding scale insulin correction.  Continue to hold oral anti glycemic medications.   3. HTN- Will continue to hold diuretics and antihypertensive agents and reassess in the am  4. Appreciate Orthopedics consult, AGUEDA done today with Dr Jackman.  Resume Plavix. Hold ASA given Plavix and Warfarin. Bridge with Lovenox until INR therapeutic.  Add Ca and Vit D. Posterior hip precautions, abductor pillow x 24 hours, WBAT, warfarin for DVT proph.    5. Acute blood loss anemia-  Hgb 11.5 on admission. Hgb 8.8 today.   6. WOC consult for continued cares of RLE ulcerations. Per patient, he typically does daily wound cares with Gentamycin, topical silvadene and cover with dressing.   7. Pain control: oral and IV medications. Ice as needed for swelling of left hip. Increased Robaxin dose and increase oxycodone frequency given worsening pain control  8. Home medications: reviewed and appropriate medications resumed.   9. Physical and occupational therapy   10. Social work consult for discharge planning       Code status: Full Code      General Cares:  GI Prophylaxis: none  DVT Prophylaxis: SCDs. Lovenox.   Warfarin   Date of last stool/Bowel Regimen:8/24/2017. Protocol ordered   Pulmonary toilet: IS, CDB     Code status: Full  Disposition: 7B,  Interval History   No acute events.   Denies dyspnea, chest pain, palpitations, dizziness, vomiting.   ROS x 8 negative with exception of those things listed in interval hx    Physical Exam   Temp: 99  F (37.2  C) Temp src: Axillary BP: 134/60 Pulse: 92 Heart Rate: 87 Resp: 16 SpO2: 96 % O2 Device: None (Room air)    Vitals:    08/24/17 2120 08/25/17 0234   Weight: 82.1 kg (181 lb) 89.9 kg (198 lb 1.6 oz)     Vital Signs with Ranges  Temp:  [98.7  F (37.1  C)-99.7  F (37.6  C)] 99  F (37.2  C)  Pulse:  [88-92] 92  Heart Rate:  [] 87  Resp:  [16-22] 16  BP: (105-146)/(46-71) 134/60  SpO2:  [95 %-98 %] 96 %  I/O last 3 completed shifts:  In: 1210 [P.O.:1210]  Out: 1050 [Urine:1050]    [place] Coma Scale - Total 15/15    Constitutional: Awake, alert, cooperative, no apparent distress  Eyes: Lids and lashes normal, pupils equal, round and reactive to light, extra ocular muscles intact, sclera clear, conjunctiva normal.  ENT: Normocephalic, atraumatic  Respiratory: No increased work of breathing, good air exchange, clear to auscultation bilaterally, no crackles or wheezing.  Cardiovascular:  regular rate and rhythm, normal S1 and S2,  and no murmur noted.  GI: Normal bowel sounds, soft, non-distended, non-tender, no guarding  Genitourinary:  No urine assessed   Skin:  pale skin color, no redness, warmth, or swelling, no ecchymosis, no abrasions, and no jaundice. Incision covered, CDI  Musculoskeletal: There is no redness, warmth, or swelling of the joints.  Pedal pulse palpated  Neurologic: Awake, alert, oriented.  Cranial nerves II-XII are grossly intact.   No focal deficits  Neuropsychiatric: Calm, normal eye contact, alert, affect appropriate to situation, oriented, thought process normal.    Rik Saleh NP  To contact the trauma service use job code pager [phone],    Numeric texts or alpha text through [facility]

## 2017-08-30 ENCOUNTER — CARE COORDINATION (OUTPATIENT)
Dept: CARE COORDINATION | Facility: CLINIC | Age: 72
End: 2017-08-30

## 2017-08-30 ENCOUNTER — APPOINTMENT (OUTPATIENT)
Dept: PHYSICAL THERAPY | Facility: CLINIC | Age: 72
DRG: 470 | End: 2017-08-30
Payer: MEDICARE

## 2017-08-30 ENCOUNTER — APPOINTMENT (OUTPATIENT)
Dept: OCCUPATIONAL THERAPY | Facility: CLINIC | Age: 72
DRG: 470 | End: 2017-08-30
Payer: MEDICARE

## 2017-08-30 VITALS
BODY MASS INDEX: 25.42 KG/M2 | TEMPERATURE: 97.5 F | RESPIRATION RATE: 16 BRPM | WEIGHT: 198.1 LBS | OXYGEN SATURATION: 97 % | DIASTOLIC BLOOD PRESSURE: 54 MMHG | HEART RATE: 92 BPM | SYSTOLIC BLOOD PRESSURE: 118 MMHG | HEIGHT: 74 IN

## 2017-08-30 LAB
GLUCOSE BLDC GLUCOMTR-MCNC: 148 MG/DL (ref 70–99)
GLUCOSE BLDC GLUCOMTR-MCNC: 181 MG/DL (ref 70–99)
GLUCOSE BLDC GLUCOMTR-MCNC: 215 MG/DL (ref 70–99)
HGB BLD-MCNC: 9.3 G/DL (ref 13.3–17.7)
INR PPP: 2.42 (ref 0.86–1.14)

## 2017-08-30 PROCEDURE — 40000133 ZZH STATISTIC OT WARD VISIT: Performed by: OCCUPATIONAL THERAPIST

## 2017-08-30 PROCEDURE — 36415 COLL VENOUS BLD VENIPUNCTURE: CPT | Performed by: ORTHOPAEDIC SURGERY

## 2017-08-30 PROCEDURE — 40000193 ZZH STATISTIC PT WARD VISIT: Performed by: PHYSICAL THERAPIST

## 2017-08-30 PROCEDURE — 97530 THERAPEUTIC ACTIVITIES: CPT | Mod: GP | Performed by: PHYSICAL THERAPIST

## 2017-08-30 PROCEDURE — 25000132 ZZH RX MED GY IP 250 OP 250 PS 637: Mod: GY | Performed by: NURSE PRACTITIONER

## 2017-08-30 PROCEDURE — 00000146 ZZHCL STATISTIC GLUCOSE BY METER IP

## 2017-08-30 PROCEDURE — 85610 PROTHROMBIN TIME: CPT | Performed by: ORTHOPAEDIC SURGERY

## 2017-08-30 PROCEDURE — 97535 SELF CARE MNGMENT TRAINING: CPT | Mod: GO | Performed by: OCCUPATIONAL THERAPIST

## 2017-08-30 PROCEDURE — 85018 HEMOGLOBIN: CPT | Performed by: NURSE PRACTITIONER

## 2017-08-30 PROCEDURE — A9270 NON-COVERED ITEM OR SERVICE: HCPCS | Mod: GY | Performed by: PHYSICIAN ASSISTANT

## 2017-08-30 PROCEDURE — 25000132 ZZH RX MED GY IP 250 OP 250 PS 637: Mod: GY | Performed by: PHYSICIAN ASSISTANT

## 2017-08-30 PROCEDURE — A9270 NON-COVERED ITEM OR SERVICE: HCPCS | Mod: GY | Performed by: NURSE PRACTITIONER

## 2017-08-30 PROCEDURE — 97116 GAIT TRAINING THERAPY: CPT | Mod: GP | Performed by: PHYSICAL THERAPIST

## 2017-08-30 PROCEDURE — 25000128 H RX IP 250 OP 636: Performed by: NURSE PRACTITIONER

## 2017-08-30 PROCEDURE — 36415 COLL VENOUS BLD VENIPUNCTURE: CPT | Performed by: NURSE PRACTITIONER

## 2017-08-30 PROCEDURE — 97110 THERAPEUTIC EXERCISES: CPT | Mod: GP | Performed by: PHYSICAL THERAPIST

## 2017-08-30 RX ORDER — SIMVASTATIN 10 MG
10 TABLET ORAL AT BEDTIME
Qty: 90 TABLET | Refills: 3 | DISCHARGE
Start: 2017-08-30 | End: 2018-07-23

## 2017-08-30 RX ORDER — WARFARIN SODIUM 1 MG/1
2 TABLET ORAL
Status: DISCONTINUED | OUTPATIENT
Start: 2017-08-30 | End: 2017-08-30 | Stop reason: HOSPADM

## 2017-08-30 RX ORDER — OXYCODONE HYDROCHLORIDE 5 MG/1
5-10 TABLET ORAL
Refills: 0 | Status: SHIPPED | DISCHARGE
Start: 2017-08-30 | End: 2017-09-05

## 2017-08-30 RX ORDER — METHOCARBAMOL 750 MG/1
750 TABLET, FILM COATED ORAL 4 TIMES DAILY
Qty: 45 TABLET | DISCHARGE
Start: 2017-08-30 | End: 2018-01-22

## 2017-08-30 RX ORDER — ACETAMINOPHEN 500 MG
1000 TABLET ORAL EVERY 6 HOURS PRN
DISCHARGE
Start: 2017-08-30 | End: 2017-08-31

## 2017-08-30 RX ORDER — FERROUS SULFATE 325(65) MG
325 TABLET ORAL 2 TIMES DAILY
Qty: 60 TABLET | Refills: 11 | Status: ON HOLD | DISCHARGE
Start: 2017-08-30 | End: 2020-02-06

## 2017-08-30 RX ORDER — HYDROCORTISONE VALERATE CREAM 2 MG/G
CREAM TOPICAL
Qty: 15 G | Refills: 3 | DISCHARGE
Start: 2017-08-30 | End: 2018-01-22

## 2017-08-30 RX ORDER — GENTAMICIN SULFATE 1 MG/G
CREAM TOPICAL DAILY
Qty: 30 G | Refills: 5 | DISCHARGE
Start: 2017-08-30 | End: 2018-12-18

## 2017-08-30 RX ORDER — AMMONIUM LACTATE 12 G/100G
CREAM TOPICAL 2 TIMES DAILY PRN
Qty: 385 G | Refills: 3 | DISCHARGE
Start: 2017-08-30 | End: 2018-07-24

## 2017-08-30 RX ORDER — SILVER SULFADIAZINE 10 MG/G
CREAM TOPICAL DAILY
Qty: 85 G | Refills: 5 | DISCHARGE
Start: 2017-08-30 | End: 2018-02-06

## 2017-08-30 RX ORDER — AMOXICILLIN 250 MG
2 CAPSULE ORAL 2 TIMES DAILY
Qty: 100 TABLET | DISCHARGE
Start: 2017-08-30 | End: 2017-09-05

## 2017-08-30 RX ORDER — CLOPIDOGREL BISULFATE 75 MG/1
75 TABLET ORAL DAILY
Qty: 30 TABLET | Refills: 11 | DISCHARGE
Start: 2017-08-30 | End: 2019-10-04

## 2017-08-30 RX ORDER — NATEGLINIDE 120 MG/1
TABLET ORAL
Qty: 90 TABLET | Refills: 11 | DISCHARGE
Start: 2017-08-30 | End: 2019-01-04

## 2017-08-30 RX ORDER — CALCIUM CARBONATE 500(1250)
1250 TABLET ORAL 2 TIMES DAILY WITH MEALS
Qty: 90 TABLET | DISCHARGE
Start: 2017-08-30 | End: 2017-10-23

## 2017-08-30 RX ORDER — ASCORBIC ACID 500 MG
500 TABLET ORAL 2 TIMES DAILY
Qty: 30 TABLET | DISCHARGE
Start: 2017-08-30 | End: 2020-02-17

## 2017-08-30 RX ORDER — POLYETHYLENE GLYCOL 3350 17 G/17G
17 POWDER, FOR SOLUTION ORAL DAILY
Qty: 7 PACKET | DISCHARGE
Start: 2017-08-30 | End: 2017-09-05

## 2017-08-30 RX ADMIN — POLYETHYLENE GLYCOL 3350 17 G: 17 POWDER, FOR SOLUTION ORAL at 07:48

## 2017-08-30 RX ADMIN — OXYCODONE HYDROCHLORIDE AND ACETAMINOPHEN 1000 MG: 500 TABLET ORAL at 07:48

## 2017-08-30 RX ADMIN — SENNOSIDES AND DOCUSATE SODIUM 2 TABLET: 8.6; 5 TABLET ORAL at 07:48

## 2017-08-30 RX ADMIN — SILVER SULFADIAZINE: 10 CREAM TOPICAL at 07:47

## 2017-08-30 RX ADMIN — OXYCODONE HYDROCHLORIDE 10 MG: 5 TABLET ORAL at 07:47

## 2017-08-30 RX ADMIN — CALCIUM 1250 MG: 500 TABLET ORAL at 07:48

## 2017-08-30 RX ADMIN — ACETAMINOPHEN 1000 MG: 500 TABLET, FILM COATED ORAL at 07:48

## 2017-08-30 RX ADMIN — GENTAMICIN SULFATE: 1 CREAM TOPICAL at 07:47

## 2017-08-30 RX ADMIN — BISACODYL 10 MG: 10 SUPPOSITORY RECTAL at 10:05

## 2017-08-30 RX ADMIN — CLOPIDOGREL 75 MG: 75 TABLET, FILM COATED ORAL at 07:48

## 2017-08-30 RX ADMIN — INSULIN ASPART 2 UNITS: 100 INJECTION, SOLUTION INTRAVENOUS; SUBCUTANEOUS at 11:57

## 2017-08-30 RX ADMIN — METHOCARBAMOL 750 MG: 750 TABLET ORAL at 11:57

## 2017-08-30 RX ADMIN — ACETAMINOPHEN 1000 MG: 500 TABLET, FILM COATED ORAL at 00:08

## 2017-08-30 RX ADMIN — FERROUS SULFATE TAB 325 MG (65 MG ELEMENTAL FE) 325 MG: 325 (65 FE) TAB at 07:48

## 2017-08-30 RX ADMIN — VITAMIN D, TAB 1000IU (100/BT) 3000 UNITS: 25 TAB at 07:48

## 2017-08-30 RX ADMIN — INSULIN ASPART 1 UNITS: 100 INJECTION, SOLUTION INTRAVENOUS; SUBCUTANEOUS at 07:48

## 2017-08-30 RX ADMIN — METHOCARBAMOL 750 MG: 750 TABLET ORAL at 07:48

## 2017-08-30 RX ADMIN — ENOXAPARIN SODIUM 40 MG: 40 INJECTION SUBCUTANEOUS at 09:24

## 2017-08-30 ASSESSMENT — PAIN DESCRIPTION - DESCRIPTORS: DESCRIPTORS: ACHING;SORE

## 2017-08-30 NOTE — PROGRESS NOTES
Social Work Services Discharge Note      Patient Name:  Amos Walker     Anticipated Discharge Date:  8/30/17    Discharge Disposition:   TCU:  [facility]   [address] [place] [address]  P. [phone], F. [phone]    Following MD:  Stefan MUNSON     Pre-Admission Screening (PAS) online form has been completed.  The Level of Care (LOC) is:  Determined  Confirmation Code is:  [id number]  Patient/caregiver informed of referral to [facility] for Pre-Admission Screening for skilled nursing facility (SNF) placement and to expect a phone call post discharge from SNF.     Additional Services/Equipment Arranged:  W/c transport arranged via [employer] ([phone]) for today at 1pm.     Patient / Family response to discharge plan:  Pt is agreeable as long as his wife is. Pt's wife Danielle is agreeable and pt defers to her.     Persons notified of above discharge plan: Pt,  pt's wife Danielle, bedside nurse, charge nurse, NST, receiving facility, Trauma team    Staff Discharge Instructions:  Please fax discharge orders and signed hard scripts for any controlled substances.  Please print a packet and send with patient.     CTS Handoff completed:  YES    Medicare Notice of Rights provided to the patient/family:  Attempted to but was unable to as pt was occupied    VALENTÍN Mckeon, MSW  7B   [phone] (pager) [phone]  8/30/2017

## 2017-08-30 NOTE — DISCHARGE SUMMARY
[facility], [place]    Discharge Summary  Trauma Surgery Service    Date of Admission:  8/24/2017  Date of Discharge:  8/30/2017  Discharging Provider: Silviano Saleh NPIlinaaC   Date of Service (when I saw the patient): 08/30/17    Primary Provider: Racheal Swift  Primary Care clinic: [address] [place] [address]  [facility] [address]  Phone: [phone]  Fax number: [phone]     Discharge Diagnoses   Trauma mechanism:Fall                   Time/date of injury: 8/25/2017, around 6pm                 Known Injuries:  1. Left femoral neck fracture  2. Left elbow bruising  Other diagnosis   1. Acute pain  2. Hypertension  3. Hyperlipidemia   4. Acute blood loss anemia   5. PAD s/p right leg stenting--on Plavix and ASA   6. Chronic lower extremity ulcers with MRSA  7. Diabetes Mellitus   8. Chronic kidney disease, stage 3  9. Tobacco dependence    Hospital Course   History of Present Illness: Amos Walker is a 72 year old male  admitted on 8/24/2017. He has a history of PAD s/p RLE stent, IDDM2 c/b peripheral neuropathy, HTN, and long-term tobacco use, who was admitted after a mechanical fall and found to have a left femoral neck fracture.     Traumatic Injury  The patient sustained the above injury as a result of fall.  He was seen by the Trauma Resource Nurse and injury prevention education was performed.  The mechanism of injury and factors contributing to the accident were discussed with the patient.  Strategies on how to prevent future accidents were reviewed.  The patient underwent tertiary examination to evaluate for additional injuries.  The systematic review did not find any other injuries.    Orthopedic Injury: Left Femoral Neck fracture   Amos Walker was evaluated by Orthopedics and underwent a total hip arthoplasty  on 8/27 by Dr. Jackman. Please see operative note by Dr Jackman for details regarding the procedure. He will need Coumadin for DVT prophylaxis for 4-6 weeks. He  "is recommended to Weight bearingand is requested to follow up in the Orthopedic Clinic in 2 weeks. He  was evaluated by physical and occupational therapies during his hospitalization.  Please see summary of most current therapy notes below.     DM II- Restarted lantus at home dose with sliding scale insulin correction.  Resume oral anti glycemic medication at discharge     HTN- Will continue to hold diuretics and antihypertensive agents and may be resumed as needed     RLE ulceration- WOC consult for continued cares of RLE ulcerations. Daily wound cares with Gentamycin, topical silvadene and cover with dressing.     Acute pain  Pain was controlled with a multi-modality approach.  The current regimen for Amos Walker includes the following, scheduled acetaminophen, topical analgesic and PRN short acting opioids.  Adequate pain control was achieved with this regimen.  We anticipate that they will taper off this regimen over the next several weeks.    Acute blood loss anemia  Amos Walker was admitted with a hemoglobin of 11.5 . It is common for blood loss associated with this injury.  Our protocol is to keep hemoglobin >7.0 g/dL.   the most recent hemoglobin is 8.8.  There are no signs of symptoms of ongoing blood loss at the time of discharge.           Palliative Care Consult  The palliative care team was consulted to assist in the care and future life planning regarding the changes the above injuries have created. Often this sort of injury is a clear marker of general decline in the aged population.  During their time with the patient and family, these are the things they focused on this admission:      Therapy Recommendations:   Current status of physical therapies on discharge: PT eval complete and treatment provided. Post-op ex initiated and pt educated on safe mobilization post-op given L posterior hip precautions. Given pt's height (6'2\") pillows placed on chairs to ensure 90 deg precaution (keeping knee " no higher than hip when sitting) maintained. Pt required up to MODA to stand up due to reduced strength and L LE WB. Pt ambulated 25 ft x 2 with FWW, WBAT L LE provided LIZ x 1 and close WC follow. MODA x 2 required for safe sit>supine.       Recommend TCU stay. Pt presents far below prior functional baseline and has good motivation and participation for PT to progress well in a TCU setting.           Current status of occupational therapies on discharge: Min A X 1-2 with BADLs and functional transf.s; amb. with FWW bed-chair with minAX 1-2.  Pt. aware of and maintaining post op precautions throughout session.  Recommend d/c to rehab; TCU to max. safety/indep. with ADL/IADLs and functional mobility.         DATE: 8/27  Procedure(s):  Left Total Hip Replacement - Wound Class: I-Clean  Surgeon(s): Surgeon(s) and Role:     * Ish Jackman MD - Primary     * Afsaneh Merino MD - Resident - Assisting     * Christina Chaudhry MD - Resident - Assisting  Non-operative procedures None performed    Code Status   Full Code  Shayan Coma Scale - Total 15/15   Constitutional: Awake, alert, cooperative, no apparent distress  Eyes: Lids and lashes normal, pupils equal, round and reactive to light, extra ocular muscles intact, sclera clear, conjunctiva normal.  ENT: Normocephalic, atraumatic  Respiratory: No increased work of breathing, good air exchange, clear to auscultation bilaterally, no crackles or wheezing.  Cardiovascular:  regular rate and rhythm, normal S1 and S2,  and no murmur noted.  GI: Normal bowel sounds, soft, non-distended, non-tender, no guarding  Genitourinary:  No urine assessed   Skin:  pale skin color, no redness, warmth, or swelling, no ecchymosis, no abrasions, and no jaundice. Incision covered, CDI  Musculoskeletal: There is no redness, warmth, or swelling of the joints.  Pedal pulse palpated  Neurologic: Awake, alert, oriented.  Cranial nerves II-XII are grossly intact.   No focal  "deficits  Neuropsychiatric: Calm, normal eye contact, alert, affect appropriate to situation, oriented, thought process normal.       Discharge Disposition   Discharged to short-term care facility  Condition at discharge: Good  Discharge VS: Blood pressure 118/54, pulse 92, temperature 97.5  F (36.4  C), temperature source Oral, resp. rate 16, height 1.88 m (6' 2\"), weight 89.9 kg (198 lb 1.6 oz), SpO2 97 %.    Consultations This Hospital Stay   INTERNAL MEDICINE ADULT IP CONSULT FOR [facility]  WOUND OSTOMY CONTINENCE NURSE  IP CONSULT  MEDICATION HISTORY IP PHARMACY CONSULT  INTERNAL MEDICINE ADULT IP CONSULT FOR [facility]  PHARMACY TO DOSE WARFARIN  PHYSICAL THERAPY ADULT IP CONSULT  OCCUPATIONAL THERAPY ADULT IP CONSULT  PHYSICAL THERAPY ADULT IP CONSULT  OCCUPATIONAL THERAPY ADULT IP CONSULT    Discharge Orders     General info for SNF   Length of Stay Estimate: Short Term Care: Estimated # of Days <30  Condition at Discharge: Improving  Level of care:skilled   Rehabilitation Potential: Excellent  Admission H&P remains valid and up-to-date: Yes  Recent Chemotherapy: N/A  Use Nursing Home Standing Orders: Yes     Mantoux instructions   Give two-step Mantoux (PPD) Per Facility Policy Yes     Reason for your hospital stay   Trauma mechanism:Fall                   Time/date of injury: 8/25/2017, around 6pm                 Known Injuries:  1. Left femoral neck fracture  2. Left elbow bruising  Other diagnosis   1. Acute pain  2. Hypertension  3. Hyperlipidemia   4. Acute blood loss anemia   5. PAD s/p right leg stenting--on Plavix and ASA   6. Chronic lower extremity ulcers with MRSA  7. Diabetes Mellitus   8. Chronic kidney disease, stage 3  9. Tobacco dependence     Glucose monitor nursing POCT   Before meals and at bedtime     Intake and output   Every shift     Wound care (specify)   Right leg: (shin and foot wounds)  1. Spray with microklenz spray   2. Apply gentamicin to shin wound bed  3. Apply Silvadene " around wound edges and to foot wound and any cracked skin   4. Cover with Primapore dressings     Wound care   Site:   Left hip  Instructions:  Remove aquacel dressing in 7 days. Keep incision CDI     Activity - Up with nursing assistance     Follow Up and recommended labs and tests   Follow up with your primary care provider for continued medical care and hospital follow up in 5-10 days.     Orthopaedic Clinic- follow up in 2 weeks with Dr. Jackman   [facility]  Floor 4   [address] [place] [id number]   Appointments: [phone]     Physical Therapy Adult Consult   Evaluate and treat as clinically indicated.    Reason:  S/p left femur fracture     Occupational Therapy Adult Consult   Evaluate and treat as clinically indicated.    Reason:  S/p left femur fracture     Contact Isolation     Fall precautions     Advance Diet as Tolerated   Follow this diet upon discharge:     Regular Diet Adult       Discharge Medications   Current Discharge Medication List      START taking these medications    Details   oxyCODONE (ROXICODONE) 5 MG IR tablet Take 1-2 tablets (5-10 mg) by mouth every 3 hours as needed for moderate to severe pain  Refills: 0    Associated Diagnoses: Closed fracture of neck of right femur, initial encounter (H)      acetaminophen (TYLENOL) 500 MG tablet Take 2 tablets (1,000 mg) by mouth every 6 hours as needed for mild pain    Associated Diagnoses: Closed fracture of neck of right femur, initial encounter (H)      Warfarin Therapy Reminder 1 each continuous prn    Associated Diagnoses: Closed fracture of neck of right femur, initial encounter (H)      !! insulin aspart (NOVOLOG PEN) 100 UNIT/ML injection Inject 1-7 Units Subcutaneous 3 times daily (before meals) Do Not give Correction Insulin if Pre-Meal BG less than 140.    - 189 give 1 unit.    - 239 give 2 units.    - 289 give 3 units.    - 339 give 4 units.   - 399 give 5 units.   BG  400-449 give 6 units  BG greater than or equal to 450 give 7 units.    Associated Diagnoses: Type 2 diabetes, controlled, with neuropathy (H)      !! insulin aspart (NOVOLOG PEN) 100 UNIT/ML injection Inject 1-5 Units Subcutaneous At Bedtime Do Not give Bedtime Correction Insulin if BG less than  200.   For  - 249 give 1 units.   For  - 299 give 2 units.   For  - 349 give 3 units.   For  -399 give 4 units.   For BG greater than or equal to 400 give 5 units.  Notify provider if glucose greater than or equal to 350 mg/dL after administration of correction dose.    Associated Diagnoses: Type 2 diabetes, controlled, with neuropathy (H)      polyethylene glycol (MIRALAX/GLYCOLAX) Packet Take 17 g by mouth daily  Qty: 7 packet    Associated Diagnoses: Constipation, unspecified constipation type      senna-docusate (SENOKOT-S;PERICOLACE) 8.6-50 MG per tablet Take 2 tablets by mouth 2 times daily  Qty: 100 tablet    Associated Diagnoses: Constipation, unspecified constipation type      calcium carbonate (OS-CLAUDIA 500 MG [place]) 1250 MG tablet Take 1 tablet (1,250 mg) by mouth 2 times daily (with meals)  Qty: 90 tablet    Associated Diagnoses: Closed fracture of neck of right femur, initial encounter (H)      methocarbamol (ROBAXIN) 750 MG tablet Take 1 tablet (750 mg) by mouth 4 times daily  Qty: 45 tablet    Associated Diagnoses: Closed fracture of neck of right femur, initial encounter (H)       !! - Potential duplicate medications found. Please discuss with provider.      CONTINUE these medications which have CHANGED    Details   insulin glargine (LANTUS SOLOSTAR) 100 UNIT/ML injection Inject 25 Units Subcutaneous At Bedtime  Qty: 9 mL, Refills: 5    Associated Diagnoses: Type 2 diabetes mellitus with diabetic peripheral angiopathy without gangrene, with long-term current use of insulin (H)      nateglinide (STARLIX) 120 MG tablet TAKE 1 TABLET BY MOUTH THREE TIMES DAILY BEFORE MEALS  Qty: 90  tablet, Refills: 11    Associated Diagnoses: Type 2 diabetes, controlled, with neuropathy (H)      sitagliptin (JANUVIA) 100 MG tablet Take 1 tablet (100 mg) by mouth daily  Qty: 90 tablet, Refills: 3    Associated Diagnoses: Type 2 diabetes, controlled, with neuropathy (H)      simvastatin (ZOCOR) 10 MG tablet Take 1 tablet (10 mg) by mouth At Bedtime  Qty: 90 tablet, Refills: 3    Associated Diagnoses: Type 2 diabetes, controlled, with neuropathy (H)      ammonium lactate (LAC-HYDRIN) 12 % cream Apply topically 2 times daily as needed for dry skin  Qty: 385 g, Refills: 3    Associated Diagnoses: Venous stasis; Type 2 diabetes, controlled, with neuropathy (H)      gentamicin (GARAMYCIN) 0.1 % cream Apply topically daily To right leg ulcer.  Qty: 30 g, Refills: 5    Associated Diagnoses: Ulcer of right lower leg, with fat layer exposed (H); Chronic venous hypertension with ulcer involving right side (H); Type 2 diabetes, controlled, with neuropathy (H)      hydrocortisone (WESTCORT) 0.2 % cream Apply sparingly to affected area twice times daily for 14 days.  Qty: 15 g, Refills: 3    Associated Diagnoses: Dermatitis      silver sulfADIAZINE (SILVADENE) 1 % cream Apply topically daily To affected areas on right foot and leg.  Qty: 85 g, Refills: 5    Associated Diagnoses: Ulcer of right lower leg, with fat layer exposed (H); Chronic venous hypertension with ulcer involving right side (H); Type 2 diabetes, controlled, with neuropathy (H)      ferrous sulfate (IRON) 325 (65 FE) MG tablet Take 1 tablet (325 mg) by mouth 2 times daily  Qty: 60 tablet, Refills: 11    Associated Diagnoses: Peripheral vascular disease, unspecified (H)      clopidogrel (PLAVIX) 75 MG tablet Take 1 tablet (75 mg) by mouth daily  Qty: 30 tablet, Refills: 11    Associated Diagnoses: Peripheral vascular disease, unspecified (H)      ascorbic acid 500 MG TABS Take 1 tablet (500 mg) by mouth 2 times daily  Qty: 30 tablet    Associated Diagnoses:  Ulcer of right lower leg, with fat layer exposed (H)      cholecalciferol 3000 UNITS TABS Take 3,000 Units by mouth daily  Qty: 30 tablet    Associated Diagnoses: Closed fracture of neck of right femur, initial encounter (H)         CONTINUE these medications which have NOT CHANGED    Details   sildenafil (VIAGRA) 50 MG tablet Take 1 tablet (50 mg) by mouth daily as needed for erectile dysfunction  Qty: 10 tablet, Refills: 11    Associated Diagnoses: Vasculogenic erectile dysfunction, unspecified vasculogenic erectile dysfunction type      insulin pen needle (B-D U/F) 31G X 8 MM Use 1 daily o as directed  Qty: 100 each, Refills: 3    Associated Diagnoses: Diabetes mellitus, type II (H)      !! order for DME Please measure and distribute 1 pair of 30mmHg - 40mmHg knee high open toe ulcercare compression stockings. Jobst ultrasheer or equivalent.  Qty: 2 each, Refills: 6    Associated Diagnoses: Varicose veins of bilateral lower extremities with other complications      blood glucose monitoring (ONE TOUCH ULTRA) test strip Use to test blood sugars 2 times daily or as directed.  Qty: 60 strip, Refills: 11    Associated Diagnoses: Type 2 diabetes mellitus (H)      blood glucose monitoring (FREESTYLE) lancets Use to test blood sugars 2 as directed.  Qty: 3 Box, Refills: 3    Associated Diagnoses: Type 2 diabetes, uncontrolled, with neuropathy (H)      !! order for DME Please measure and distribute 1 pair of 20mmHg - 30mmHg knee high open or closed toe compression stockings. Jobst ultrasheer or equivalent.  Qty: 3 each, Refills: 12    Associated Diagnoses: Varicose veins of both lower extremities with complications      !! order for DME Please measure and distribute 1 pair of 20mm Hg - 30mm Hg knee high ULCER CARE open or closed toe compression stockings.   Patient has a size 13 foot and please take this into consideration.   Jobst or equivalent  Qty: 2 each, Refills: 1    Associated Diagnoses: Varicose veins of lower  "extremities with other complications; Venous stasis ulcer of right lower extremity (H)      Gauze Pads & Dressings (OPTIFOAM) 6\"X6\" PADS 1 Box once a week  Qty: 1 each, Refills: 6    Associated Diagnoses: Ulcer of right leg, with fat layer exposed (H)       !! - Potential duplicate medications found. Please discuss with provider.      STOP taking these medications       HYDROCHLOROTHIAZIDE PO Comments:   Reason for Stopping:         LISINOPRIL PO Comments:   Reason for Stopping:         aspirin 81 MG chewable tablet Comments:   Reason for Stopping:             Allergies   Allergies   Allergen Reactions     Lisinopril Other (See Comments)     dizziness     Neomycin Other (See Comments)     Wound gets worse     Methylchloroisothiazolinone [Methylisothiazolinone] Rash     Povidone Iodine Rash     Data   Most Recent 3 CBC's:  Recent Labs   Lab Test  08/29/17   0756  08/28/17   0753  08/24/17   2131   WBC  7.4  8.1  8.0   HGB  8.8*  9.4*  11.5*   MCV  92  93  94   PLT  135*  125*  175      Most Recent 3 BMP's:  Recent Labs   Lab Test  08/29/17   0756  08/28/17   0753  08/25/17   0859   NA  133  133  137   POTASSIUM  3.9  4.1  3.9   CHLORIDE  101  103  107   CO2  24  24  23   BUN  22  19  27   CR  1.12  1.06  1.15   ANIONGAP  8  6  6   CLAUDIA  7.8*  7.6*  7.8*   GLC  153*  174*  95     Most Recent 2 LFT's:  Recent Labs   Lab Test  10/31/16   1205  11/18/14   0853   ALT  14  15     Most Recent INR's and Anticoagulation Dosing History:  Anticoagulation Dose History     Recent Dosing and Labs Latest Ref Rng & Units 8/25/2017 8/27/2017 8/28/2017 8/29/2017 8/30/2017    Warfarin 1 mg - - - - 4 mg -    Warfarin 5 mg - - 5 mg 5 mg - -    INR 0.86 - 1.14 1.13 - 1.35(H) 1.91(H) 2.42(H)        Most Recent 3 Troponin's:No lab results found.  Most Recent 6 Bacteria Isolates From Any Culture (See EPIC Reports for Culture Details):  Recent Labs   Lab Test  06/29/17   1100  06/01/17   1130  03/07/17   1155   CULT  No growth  No anaerobes " isolated  Heavy growth Pseudomonas aeruginosa*  No anaerobes isolated  Moderate growth Staphylococcus aureus  Heavy growth Strain 2 Staphylococcus aureus  *     Most Recent TSH, T4 and A1c Labs:  Recent Labs   Lab Test  06/16/17   1335   03/21/16   1226   TSH   --    --   0.55   A1C  6.7*   < >   --     < > = values in this interval not displayed.     Results for orders placed or performed during the hospital encounter of 08/24/17   Hip XR, 2+ views, left     Value    Radiologist flags Femoral neck fracture (Urgent)    Narrative    XR HIP LEFT 2-3 VIEWS 8/24/2017 11:37 PM    History: fall    Comparison: None    Findings: AP and lateral views of the left hip. There is a fracture  deformity of the left femoral neck with some degree of height loss.  Osteoarthritis of the femoral-acetabular joint. Vascular  calcifications and phleboliths seen in the pelvis.  Mild anterior  displacement of the trochanter seen on the x-table lateral view.        Impression    Impression: Fracture of the left femoral neck.  Mild foreshortening  and anterior displacement of the greater trochanter.     [Urgent Result: Femoral neck fracture]    Finding was identified on 8/24/2017 11:40 PM.     Dr. Momin was contacted by Dr. Loera at 8/24/2017 11:43 PM and  verbalized understanding of the urgent finding.      I have personally reviewed the examination and initial interpretation  and I agree with the findings.    GAMAL STILES MD   Pelvis XR, 1-2 views     Value    Radiologist flags Left femoral neck fracture (Urgent)    Narrative    XR PELVIS 1/2 VW 8/24/2017 11:37 PM    History: fall    Comparison: None.    Findings: Single AP view of the pelvis. There is a fracture deformity  of the femoral neck with mild foreshortening and anterior/superior  displacement of the greater trochanter. Extensive vascular  calcifications. Phleboliths in the pelvis. The right hip is normal.  There is a well-corticated ossification superior to the right  lesser  trochanter, which may be secondary to an old fracture/avulsion or  ossific tendinitis.      Impression    Impression: Left femoral neck fracture.    [Urgent Result: Left femoral neck fracture]    Finding was identified on 8/24/2017 11:45 PM.     Dr. Momin was contacted by Dr. Loera at 8/24/2017 11:47 PM and  verbalized understanding of the urgent finding.     I have personally reviewed the examination and initial interpretation  and I agree with the findings.    GAMAL STILES MD   Chest  XR, 1 view portable    Narrative    XR CHEST PORT 1 VW 8/25/2017 1:35 AM    History: fall    Comparison: CT chest 12/5/2016    Findings: Portable AP view of the chest, 2 views. Minimal left basilar  atelectasis. The heart size is normal. No significant pleural effusion  or pneumothorax.      Impression    Impression: No focal airspace opacities. No definite bony  abnormalities.    I have personally reviewed the examination and initial interpretation  and I agree with the findings.    REBA SPENCE MD   Elbow  XR, G/E 3 views, left     Value    Radiologist flags Occult elbow fracture cannot be excluded,    Narrative    XR ELBOW LT G/E 3 VW 8/25/2017 1:35 AM    History: fall    Comparison: None.    Findings: AP, oblique, and lateral views of the left elbow. There is  marked soft tissue swelling overlying the distal humerus posteriorly.  Very subtle cortical irregularity noted about the distal posterior and  lateral epicondyle just proximal or at the level of the closed physis.  No definite anterior sail sign or posterior fat pad sign.         Impression    Impression: No acute displaced fracture is identified, an underlying  occult fracture cannot be excluded. Marked posterior soft tissue  swelling at the level of the distal humerus. Recommend clinical  correlation and if clinically indicated a follow-up radiograph in 7  days.     [Consider Follow Up: Occult elbow fracture cannot be excluded,  significant posterior  soft tissue swelling. Recommend radiographic  follow-up in 7 days if clinically indicated.]    This report will be copied to the [facility] to ensure a  provider acknowledges the finding.     I have personally reviewed the examination and initial interpretation  and I agree with the findings.    JUTTA ELLERMANN, MD   XR Hip Port Left 1 View    Narrative    History: Hip arthroplasty.    COMPARISON: Two-view left hip 8/24/2017.    FINDINGS: There is a new left total hip arthroplasty. Screw fixated  acetabular component and noncemented femoral component. Large soft  tissue defect laterally compatible with intraoperative status. No  fracture deformity about the visualized left hemipelvis and proximal  left femur. Images burned-out about the lateral and medial aspects of  the image. Prominent arterial calcification about the upper thigh.      Impression    IMPRESSION: Satisfactory intraoperative appearance of left total hip  arthroplasty.    ISABELLA DUFF MD   XR Pelvis Port 1/2 Views    Narrative    History: Status post AP pelvis and PACU. Hip replacement for fracture.    COMPARISON: Intraoperative views of the left hip performed earlier  today.    FINDINGS: New left total hip arthroplasty again noted with screw  fixated acetabular component and noncemented femoral component. Single  cerclage wire about the intertrochanteric region. Gas in the soft  tissues, laterally compatible with immediate postoperative status. No  acute fracture, dislocation or femoral head osteonecrosis changes on  the right. Marked arterial calcifications about the pelvis and upper  thighs. No acute bony abnormalities about the visualized pelvis and  proximal femurs.      Impression    IMPRESSION: Satisfactory postoperative appearance of left total hip  arthroplasty.    ISABELLA DUFF MD       Time Spent on this Encounter   I, Rik Saleh, personally saw the patient today and spent greater than 30 minutes discharging this  patient.      We appreciate the opportunity to care for your patient while in the hospital.  Should you have any questions about their injuries or this discharge summary our contact information is below.    Trauma Services  [facility]   Department of Critical Care and Acute Care Surgery  [address] [place] [id number]  Office: [phone]

## 2017-08-30 NOTE — PROGRESS NOTES
Patient has clinic visit within 24-48 hours of Discharge so no post DC follow up call is needed        Date: 8/31/2017 Status: [place]   Time: 12:00 PM Length: 30       JOSE:     Provider: Afsaneh Suggs APRN CNP Department: [facility]   Special Needs:   Comments:     Referral Number:   Referral Status:         CSN: [id number]   Notes: Hospital Follow up     Made On:   8/31/2017 8:58 AM By:   ROSALIO MENDOZA [[email or web address]] (ES)     Patient Demographics for RASHEED SORIANO [[id number]]

## 2017-08-30 NOTE — PROGRESS NOTES
Occupational Therapy Discharge Summary    Reason for therapy discharge:    Discharged to transitional care facility.    Progress towards therapy goal(s). See goals on Care Plan in [facility] electronic health record for goal details.  Goals partially met.  Barriers to achieving goals:   discharge from facility.    Therapy recommendation(s):    Continued therapy is recommended.  Rationale/Recommendations:  continue OT at TCU.

## 2017-08-30 NOTE — PLAN OF CARE
Problem: Individualization  Goal: Patient Preferences  Outcome: No Change  Vitals:     08/29/17 2202 08/30/17 0008 08/30/17 0357 08/30/17 0500   BP: 127/60   108/49     BP Location: Left arm   Left arm     Pulse:           Resp: 16   16     Temp: 99.8  F (37.7  C) 99.9  F (37.7  C) 100  F (37.8  C) 97.8  F (36.6  C)   TempSrc: Oral   Oral Axillary   SpO2: 94%   95%     Weight:           Height:           Patient with lft hip dressing CDI,  CMS of LLE intact (has baseline neuropathy).  Lungs clear, using IS.  Tmax 100 on scheduled tylenol.  Has voided x1 for 100cc, pushing po fluids.  Declined pain meds other then scheduled tylenol.   Glc check 181.  Turns with assist of 1-2, using pillow between legs.  Continue with POC.

## 2017-08-30 NOTE — PROGRESS NOTES
"       Interval History:   Amos made good progress with PT yesterday and was able to ambulate in hallway.  He feels like his hip pain is improving.  He has been working on glucose control.  He is tolerating po and voiding normally.          Physical Exam:     Vital Signs:  /54 (BP Location: Left arm)  Pulse 92  Temp 97.5  F (36.4  C) (Oral)  Resp 16  Ht 1.88 m (6' 2\")  Wt 89.9 kg (198 lb 1.6 oz)  SpO2 97%  BMI 25.43 kg/m2    Intake/Output Summary (Last 24 hours) at 08/27/17 1209  Last data filed at 08/27/17 1205   Gross per 24 hour   Intake             1890 ml   Output             1850 ml   Net               40 ml     Gen:    No acute distress. Alert.  Pulm:  Non-labored breathing  Incision:   Dressing c/d/i    Operative extremity:   Motor: ehl/fhl/gs/at intact  Sensory: intact light touch dp/sp/t/s/s      Labs:  CBC    Recent Labs  Lab 08/29/17  0756 08/28/17  0753 08/24/17  2131   WBC 7.4 8.1 8.0   HGB 8.8* 9.4* 11.5*   * 125* 175            Assessment and Plan:    Amos Walker is a 72 year old male who is s/p Left AGUEDA for femoral neck fracture on 8/27/2017     Activity: Up with assist.  Posterior hip precautions.  Gait aids PRN.  OOB to chair for all meals.  Weight bearing status: WBAT  Antibiotics/Tetanus: Routine post operative antibiotics x 2 doses.  Diet: Begin with clear fluids and progress diet as tolerated.  DVT prophylaxis: SCDs while in hospital, resume home plavix.  Bracing/Splinting: No brace, pillow, or splint needed.  Ice: Ice operative site 20 minutes >4 times daily.  Wound Care: Leave Aquacel in place until POD10, then remove and okay to keep off.    Drains: No drain.  Pain management: transition from IV to orals as tolerated.   Physical Therapy/Occupational Therapy: Continue with PT.  WBAT, posterior hip precautions.     Follow-up: Clinic with Dr. Jackman around 2 weeks postoperatively for wound check and xrays as needed.    Disposition: Pending progress with therapies, pain " control on orals, and medical stability, anticipate discharge to SNF on POD #3.

## 2017-08-30 NOTE — PLAN OF CARE
Vitals:    08/29/17 0900 08/29/17 1541 08/29/17 1651 08/29/17 2202   BP: 134/60 (!) 89/53 122/56 127/60   BP Location: Left arm Left arm Left arm Left arm   Pulse: 92      Resp: 16 16  16   Temp: 99  F (37.2  C) 99  F (37.2  C) 98.4  F (36.9  C) 99.8  F (37.7  C)   TempSrc: Axillary Oral Oral Oral   SpO2: 96% 95%  94%   Weight:       Height:         Tmax 99.8, 95% RA, other VSS. A&O x4.  Pain controlled with PRN oxycodone and scheduled Robaxin. LS clear, diminished in bases. , 294 covered per sliding scale. L hip & R leg dressings CDI. CMS intact. Voiding spontaneously using urinal. Good appetite, 100% of dinner. Continue plan of care.

## 2017-08-30 NOTE — PLAN OF CARE
Problem: Goal Outcome Summary  Goal: Goal Outcome Summary     OT-7d: Instructed pt in adaptive dressing with AE with pt demonstrating good performance with SBA to min A. Stood once with CGA, ambulating around bed with FWW and CGA, tolerating well. Rec d/c to TCU.

## 2017-08-30 NOTE — PLAN OF CARE
Vitals:    08/30/17 0008 08/30/17 0357 08/30/17 0500 08/30/17 0750   BP:  108/49  118/54   BP Location:  Left arm  Left arm   Pulse:       Resp:  16  16   Temp: 99.9  F (37.7  C) 100  F (37.8  C) 97.8  F (36.6  C) 97.5  F (36.4  C)   TempSrc:  Oral Axillary Oral   SpO2:  95%  97%   Weight:       Height:         Afebrile, 97% RA, other VSS. Oxycodone and schedule Robaxin given for pain, effective. L hip dressing CDI. R leg ulcer dressings changed per POC. Good appetite, PO intake. BG covered per sliding scale. Ambulated in [place]yers with assist of 2. Voiding spontaneously, adequate amount. PRN suppository given, no result yet. Nurse to nurse report given to TCU. Pt discharged.

## 2017-08-30 NOTE — PLAN OF CARE
Problem: Goal Outcome Summary  Goal: Goal Outcome Summary  PT 7B: Pt progressing in strength, gait constantine and distance. Pt able to recall majority of L posterior hip precautions. Pt yet benefits from therapist cues to ensure they are maintained with transfers. Pt attained 110 ft ambulating with FWW, WBAT L LE with CGA primarily and LIZ twice to steady. Pt able to progress to step-through gait ~25% of gait duration. Pt performed several supine post-op L AGUEDA ex per protocol x 10-15 reps.      Recommend discharge to TCU to address below baseline strength, ROM, balance safety and activity tolerance. Pt is motivated and improving daily with PT.     Physical Therapy Discharge Summary    Reason for therapy discharge:    Discharged to transitional care facility.    Progress towards therapy goal(s). See goals on Care Plan in [facility] electronic health record for goal details.  Goals partially met.  Barriers to achieving goals:   discharge from facility.    Therapy recommendation(s):    Continued therapy is recommended.  Rationale/Recommendations:  see above.

## 2017-08-31 ENCOUNTER — NURSING HOME VISIT (OUTPATIENT)
Dept: GERIATRICS | Facility: CLINIC | Age: 72
End: 2017-08-31
Payer: MEDICARE

## 2017-08-31 VITALS
HEART RATE: 89 BPM | TEMPERATURE: 98.7 F | RESPIRATION RATE: 18 BRPM | OXYGEN SATURATION: 97 % | SYSTOLIC BLOOD PRESSURE: 126 MMHG | BODY MASS INDEX: 24.5 KG/M2 | DIASTOLIC BLOOD PRESSURE: 66 MMHG | WEIGHT: 190.8 LBS

## 2017-08-31 DIAGNOSIS — E11.40 TYPE 2 DIABETES, CONTROLLED, WITH NEUROPATHY (H): ICD-10-CM

## 2017-08-31 DIAGNOSIS — K59.01 SLOW TRANSIT CONSTIPATION: ICD-10-CM

## 2017-08-31 DIAGNOSIS — S72.001A CLOSED FRACTURE OF NECK OF RIGHT FEMUR, INITIAL ENCOUNTER (H): ICD-10-CM

## 2017-08-31 DIAGNOSIS — N18.30 CKD (CHRONIC KIDNEY DISEASE) STAGE 3, GFR 30-59 ML/MIN (H): ICD-10-CM

## 2017-08-31 DIAGNOSIS — I10 ESSENTIAL HYPERTENSION: ICD-10-CM

## 2017-08-31 DIAGNOSIS — D62 ANEMIA DUE TO BLOOD LOSS, ACUTE: ICD-10-CM

## 2017-08-31 DIAGNOSIS — Z72.0 TOBACCO ABUSE: ICD-10-CM

## 2017-08-31 DIAGNOSIS — Z47.1 AFTERCARE FOLLOWING LEFT HIP JOINT REPLACEMENT SURGERY: Primary | ICD-10-CM

## 2017-08-31 DIAGNOSIS — I73.9 PVD (PERIPHERAL VASCULAR DISEASE) (H): ICD-10-CM

## 2017-08-31 DIAGNOSIS — L97.919 ULCER OF LOWER LIMB, RIGHT, WITH UNSPECIFIED SEVERITY (H): ICD-10-CM

## 2017-08-31 DIAGNOSIS — Z96.642 AFTERCARE FOLLOWING LEFT HIP JOINT REPLACEMENT SURGERY: Primary | ICD-10-CM

## 2017-08-31 PROCEDURE — 99310 SBSQ NF CARE HIGH MDM 45: CPT | Performed by: NURSE PRACTITIONER

## 2017-08-31 RX ORDER — ACETAMINOPHEN 500 MG
1000 TABLET ORAL 3 TIMES DAILY
Start: 2017-08-31 | End: 2018-01-22

## 2017-08-31 NOTE — PROGRESS NOTES
[place] [facility]  PRIMARY CARE PROVIDER AND CLINIC:  Racheal Swift [address] [facility] [address] / [facility] [address]  Chief Complaint   Patient presents with     Hospital F/U     HPI:    Amos Walker is a 72 year old  (1945),admitted to the [facility] at [facility] from Hospital  [facility]. Hospital stay 8/24/17 through 8/30/17. Admitted to this facility for  rehab, medical management and nursing care. HPI information obtained from: facility chart records, facility staff, patient report and [facility] chart review. Hospital course per review of discharge summary:     This is a 72-year-old male, with a past medical history significant for peripheral artery disease s/p right leg stent, chronic MRSA lower extremity ulcers, diabetes mellitus type 2, peripheral neuropathy, chronic kidney disease stage III, tobacco dependence, hypertension and hyperlipidemia, who was admitted to the [facility] with left hip and elbow pain after a mechanical fall. A pelvic x-ray revealed a left femoral neck fracture. Trauma and Ortho were consulted. A left total hip arthroplasty was performed on 8/27/17 by Dr. Jackman. Hemoglobin 11.5 8/24/17 -> 9.3 on 8/30/17. A TCU stay was recommended for ongoing physical rehabilitation.     Current issues are:        Complains of the pain in the left hip. States he would like to have no pain. Had a bowel movement this morning.When asked about code status, doesn't understand why he's getting asked the question so much. Reports he has a healthy heart and is only 72 years old. Denies shortness of breath and chest pain. Has numbness and tingling in his feet at baseline.    CODE STATUS/ADVANCE DIRECTIVES DISCUSSION:   CPR/Full code   Patient's living condition: lives with spouse    ALLERGIES:Lisinopril; Neomycin; Methylchloroisothiazolinone [methylisothiazolinone]; and Povidone iodine  PAST MEDICAL HISTORY:  has a  past medical history of CKD (chronic kidney disease) stage 3, GFR 30-59 ml/min; HTN (hypertension); Hyperlipidemia; MRSA cellulitis of right foot; PAD (peripheral artery disease) (H); Tobacco use; Type 2 diabetes mellitus (H); and Venous ulcer.  PAST SURGICAL HISTORY:  has a past surgical history that includes orthopedic surgery; orthopedic surgery (2009); vascular surgery (6434-1700); and Arthroplasty hip (Left, 8/27/2017).  FAMILY HISTORY: family history includes CANCER in his father; Cardiovascular in his son; KIDNEY DISEASE in his father and mother; Macular Degeneration in his brother. There is no history of Glaucoma.  SOCIAL HISTORY:  reports that he has been smoking Cigarettes.  He has a 25.00 pack-year smoking history. He has never used smokeless tobacco. He reports that he does not drink alcohol or use illicit drugs.    Post Discharge Medication Reconciliation Status: discharge medications reconciled and changed, per note/orders (see AVS).  Current Outpatient Prescriptions   Medication Sig Dispense Refill     oxyCODONE (ROXICODONE) 5 MG IR tablet Take 1-2 tablets (5-10 mg) by mouth every 3 hours as needed for moderate to severe pain  0     acetaminophen (TYLENOL) 500 MG tablet Take 2 tablets (1,000 mg) by mouth every 6 hours as needed for mild pain       Warfarin Therapy Reminder 1 each continuous prn       insulin aspart (NOVOLOG PEN) 100 UNIT/ML injection Inject 1-7 Units Subcutaneous 3 times daily (before meals) Do Not give Correction Insulin if Pre-Meal BG less than 140.    - 189 give 1 unit.    - 239 give 2 units.    - 289 give 3 units.    - 339 give 4 units.   - 399 give 5 units.   -449 give 6 units  BG greater than or equal to 450 give 7 units.       insulin aspart (NOVOLOG PEN) 100 UNIT/ML injection Inject 1-5 Units Subcutaneous At Bedtime Do Not give Bedtime Correction Insulin if BG less than  200.   For  - 249 give 1 units.   For  - 299 give 2 units.    For  - 349 give 3 units.   For  -399 give 4 units.   For BG greater than or equal to 400 give 5 units.  Notify provider if glucose greater than or equal to 350 mg/dL after administration of correction dose.       insulin glargine (LANTUS SOLOSTAR) 100 UNIT/ML injection Inject 25 Units Subcutaneous At Bedtime 9 mL 5     nateglinide (STARLIX) 120 MG tablet TAKE 1 TABLET BY MOUTH THREE TIMES DAILY BEFORE MEALS 90 tablet 11     sitagliptin (JANUVIA) 100 MG tablet Take 1 tablet (100 mg) by mouth daily 90 tablet 3     simvastatin (ZOCOR) 10 MG tablet Take 1 tablet (10 mg) by mouth At Bedtime 90 tablet 3     ammonium lactate (LAC-HYDRIN) 12 % cream Apply topically 2 times daily as needed for dry skin 385 g 3     gentamicin (GARAMYCIN) 0.1 % cream Apply topically daily To right leg ulcer. 30 g 5     hydrocortisone (WESTCORT) 0.2 % cream Apply sparingly to affected area twice times daily for 14 days. 15 g 3     silver sulfADIAZINE (SILVADENE) 1 % cream Apply topically daily To affected areas on right foot and leg. 85 g 5     ferrous sulfate (IRON) 325 (65 FE) MG tablet Take 1 tablet (325 mg) by mouth 2 times daily 60 tablet 11     polyethylene glycol (MIRALAX/GLYCOLAX) Packet Take 17 g by mouth daily 7 packet      senna-docusate (SENOKOT-S;PERICOLACE) 8.6-50 MG per tablet Take 2 tablets by mouth 2 times daily 100 tablet      calcium carbonate (OS-CLAUDIA 500 MG [place]. [place]) 1250 MG tablet Take 1 tablet (1,250 mg) by mouth 2 times daily (with meals) 90 tablet      clopidogrel (PLAVIX) 75 MG tablet Take 1 tablet (75 mg) by mouth daily 30 tablet 11     methocarbamol (ROBAXIN) 750 MG tablet Take 1 tablet (750 mg) by mouth 4 times daily 45 tablet      ascorbic acid 500 MG TABS Take 1 tablet (500 mg) by mouth 2 times daily 30 tablet      cholecalciferol 3000 UNITS TABS Take 3,000 Units by mouth daily 30 tablet      insulin pen needle (B-D U/F) 31G X 8 MM Use 1 daily o as directed 100 each 3     order for DME Please measure  "and distribute 1 pair of 30mmHg - 40mmHg knee high open toe ulcercare compression stockings. Jobst ultrasheer or equivalent. 2 each 6     blood glucose monitoring (ONE TOUCH ULTRA) test strip Use to test blood sugars 2 times daily or as directed. 60 strip 11     sildenafil (VIAGRA) 50 MG tablet Take 1 tablet (50 mg) by mouth daily as needed for erectile dysfunction 10 tablet 11     blood glucose monitoring (FREESTYLE) lancets Use to test blood sugars 2 as directed. 3 Box 3     order for DME Please measure and distribute 1 pair of 20mmHg - 30mmHg knee high open or closed toe compression stockings. Jobst ultrasheer or equivalent. 3 each 12     order for DME Please measure and distribute 1 pair of 20mm Hg - 30mm Hg knee high ULCER CARE open or closed toe compression stockings.   Patient has a size 13 foot and please take this into consideration.   Jobst or equivalent 2 each 1     Gauze Pads & Dressings (OPTIFOAM) 6\"X6\" PADS 1 Box once a week 1 each 6     ROS:  4 point ROS including Respiratory, CV, GI and , other than that noted in the HPI,  is negative    Exam:  /66  Pulse 89  Temp 98.7  F (37.1  C)  Resp 18  Wt 190 lb 12.8 oz (86.5 kg)  SpO2 97%  BMI 24.5 kg/m2  GENERAL APPEARANCE:  Alert, in no distress  ENT:  Mouth and posterior oropharynx normal, moist mucous membranes  EYES:  EOM, conjunctivae, lids, pupils and irises normal  RESP:  respiratory effort and palpation of chest normal, lungs clear to auscultation , no respiratory distress  CV:  Palpation and auscultation of heart done , regular rate and rhythm, no murmur, rub, or gallop  ABDOMEN:  normal bowel sounds, soft, nontender, no hepatosplenomegaly or other masses  M/S:   Active movement of bilateral upper and lower extremities. No edema.  SKIN:  Inspection of skin and subcutaneous tissue baseline, Palpation of skin and subcutaneous tissue baseline, Aquacel dressing on left hip inicsion. Small amount of ecchymosis above left hip. Kerlix on right " shin.  NEURO:   Cranial nerves 2-12 are normal tested and grossly at patient's baseline  PSYCH:  affect and mood normal    Lab/Diagnostic data:  Lab Results   Component Value Date    WBC 7.4 08/29/2017     Lab Results   Component Value Date    RBC 2.85 08/29/2017     Lab Results   Component Value Date    HGB 9.3 08/30/2017     Lab Results   Component Value Date    HCT 26.1 08/29/2017     Lab Results   Component Value Date    MCV 92 08/29/2017     Lab Results   Component Value Date    MCH 30.9 08/29/2017     Lab Results   Component Value Date    MCHC 33.7 08/29/2017     Lab Results   Component Value Date    RDW 12.7 08/29/2017     Lab Results   Component Value Date     08/29/2017     Last Basic Metabolic Panel:  Lab Results   Component Value Date     08/29/2017      Lab Results   Component Value Date    POTASSIUM 3.9 08/29/2017     Lab Results   Component Value Date    CHLORIDE 101 08/29/2017     Lab Results   Component Value Date    CLAUDIA 7.8 08/29/2017     Lab Results   Component Value Date    CO2 24 08/29/2017     Lab Results   Component Value Date    BUN 22 08/29/2017     Lab Results   Component Value Date    CR 1.12 08/29/2017     Lab Results   Component Value Date     08/29/2017     ASSESSMENT/PLAN:  Left Femoral Neck Fracture S/P Left Total Hip Arthroplasty 8/27/17 by Dr. Jackman. Follow-up with Dr. Jackman on 9/13/17 as scheduled. Oxycodone and Methocarbamol available for pain control. Will also schedule Acetaminophen. Calcium and Vitamin D order. Warfarin initiated for DVT prophylaxis x 4-6 weeks. Will continue Warfarin 2 mg dose with repeat INR 9/1/17. Physical and Occupational Therapy ordered for deconditioning.     Anemia due to Blood Loss, Acute. Secondary to above. Hemoglobin 11.5 8/24/17 -> 9.3 on 8/30/17. Repeat CBC to ensure stability 9/5/17. Also on Ferrous Sulfate.     Chronic Kidney Disease, Stage III. Baseline Creatinine ~ 1.1-1.3. Last Creatinine 1.12 on 8/29/17. Repeat BMP on  9/5/17 to ensure stability.     Peripheral Vascular Disease S/P Right Leg Stent. Continue Clopidogrel as ordered. Aspirin discontinued while on Warfarin.     Type 2 Diabetes Mellitus. Last A1C 6.7 on 6/16/17. Continue Glargine, Nateglinide and Sitagliptin as ordered. Also on Insulin Aspart Sliding Scale. Monitor Accuchecks QID prior to meals and at bedtime. Goal is to discontinue SSI prior to TCU discharge.     Hypertension/Hyperlipidemia. Monitor blood pressure daily. On no antihypertensive medications. Continue Simvastatin as ordered.    Tobacco Abuse. Noted in history, but unfortunately did not discuss current use with patient. On no Nicotine replacement. Encourage smoking cessation.     Chronic Right Lower Extremity Ulceration. Has follow-up appointment scheduled with Dr. Villanueva 9/5/17. Uncertain if patient plans to go out to this appointment given recent hospitalization. Evaluated by Wound Team during hospitalization. Continue dressing changes as ordered with Gentamycin and topical Silvadene. Wound team at facility to manage.    Constipation. Continue Miralax and Senna-S as ordered.    Electronically signed by:  LEW Montague CNP

## 2017-09-01 ENCOUNTER — NURSING HOME VISIT (OUTPATIENT)
Dept: GERIATRICS | Facility: CLINIC | Age: 72
End: 2017-09-01
Payer: MEDICARE

## 2017-09-01 VITALS
TEMPERATURE: 98.5 F | OXYGEN SATURATION: 98 % | WEIGHT: 190.8 LBS | RESPIRATION RATE: 18 BRPM | DIASTOLIC BLOOD PRESSURE: 71 MMHG | BODY MASS INDEX: 24.5 KG/M2 | SYSTOLIC BLOOD PRESSURE: 129 MMHG | HEART RATE: 94 BPM

## 2017-09-01 DIAGNOSIS — E11.22 TYPE 2 DIABETES MELLITUS WITH STAGE 3 CHRONIC KIDNEY DISEASE, WITH LONG-TERM CURRENT USE OF INSULIN (H): ICD-10-CM

## 2017-09-01 DIAGNOSIS — Z79.4 TYPE 2 DIABETES MELLITUS WITH STAGE 3 CHRONIC KIDNEY DISEASE, WITH LONG-TERM CURRENT USE OF INSULIN (H): ICD-10-CM

## 2017-09-01 DIAGNOSIS — E78.00 PURE HYPERCHOLESTEROLEMIA: ICD-10-CM

## 2017-09-01 DIAGNOSIS — I73.9 PAD (PERIPHERAL ARTERY DISEASE) (H): ICD-10-CM

## 2017-09-01 DIAGNOSIS — L97.919 ULCER OF LOWER LIMB, RIGHT, WITH UNSPECIFIED SEVERITY (H): ICD-10-CM

## 2017-09-01 DIAGNOSIS — N18.30 TYPE 2 DIABETES MELLITUS WITH STAGE 3 CHRONIC KIDNEY DISEASE, WITH LONG-TERM CURRENT USE OF INSULIN (H): ICD-10-CM

## 2017-09-01 DIAGNOSIS — Z96.642 S/P TOTAL HIP ARTHROPLASTY, LEFT: ICD-10-CM

## 2017-09-01 DIAGNOSIS — N18.30 CKD (CHRONIC KIDNEY DISEASE) STAGE 3, GFR 30-59 ML/MIN (H): ICD-10-CM

## 2017-09-01 DIAGNOSIS — K59.01 SLOW TRANSIT CONSTIPATION: ICD-10-CM

## 2017-09-01 DIAGNOSIS — I10 BENIGN ESSENTIAL HYPERTENSION: ICD-10-CM

## 2017-09-01 DIAGNOSIS — S72.002D CLOSED FRACTURE OF NECK OF LEFT FEMUR WITH ROUTINE HEALING, SUBSEQUENT ENCOUNTER: ICD-10-CM

## 2017-09-01 DIAGNOSIS — Z47.89 AFTERCARE FOLLOWING SURGERY OF THE MUSCULOSKELETAL SYSTEM: Primary | ICD-10-CM

## 2017-09-01 DIAGNOSIS — D62 ANEMIA DUE TO BLOOD LOSS, ACUTE: ICD-10-CM

## 2017-09-01 PROCEDURE — 99306 1ST NF CARE HIGH MDM 50: CPT | Performed by: INTERNAL MEDICINE
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
## 2017-09-05 ENCOUNTER — TRANSFERRED RECORDS (OUTPATIENT)
Dept: HEALTH INFORMATION MANAGEMENT | Facility: CLINIC | Age: 72
End: 2017-09-05

## 2017-09-05 ENCOUNTER — NURSING HOME VISIT (OUTPATIENT)
Dept: GERIATRICS | Facility: CLINIC | Age: 72
End: 2017-09-05
Payer: MEDICARE

## 2017-09-05 VITALS
OXYGEN SATURATION: 96 % | HEART RATE: 76 BPM | RESPIRATION RATE: 10 BRPM | BODY MASS INDEX: 22.01 KG/M2 | WEIGHT: 171.4 LBS | SYSTOLIC BLOOD PRESSURE: 146 MMHG | TEMPERATURE: 97.9 F | DIASTOLIC BLOOD PRESSURE: 76 MMHG

## 2017-09-05 DIAGNOSIS — Z72.0 TOBACCO ABUSE: ICD-10-CM

## 2017-09-05 DIAGNOSIS — E11.40 TYPE 2 DIABETES, CONTROLLED, WITH NEUROPATHY (H): ICD-10-CM

## 2017-09-05 DIAGNOSIS — S72.001A CLOSED FRACTURE OF NECK OF RIGHT FEMUR, INITIAL ENCOUNTER (H): ICD-10-CM

## 2017-09-05 DIAGNOSIS — Z79.4 TYPE 2 DIABETES MELLITUS WITH DIABETIC PERIPHERAL ANGIOPATHY WITHOUT GANGRENE, WITH LONG-TERM CURRENT USE OF INSULIN (H): ICD-10-CM

## 2017-09-05 DIAGNOSIS — D62 ANEMIA DUE TO BLOOD LOSS, ACUTE: ICD-10-CM

## 2017-09-05 DIAGNOSIS — E11.51 TYPE 2 DIABETES MELLITUS WITH DIABETIC PERIPHERAL ANGIOPATHY WITHOUT GANGRENE, WITH LONG-TERM CURRENT USE OF INSULIN (H): ICD-10-CM

## 2017-09-05 DIAGNOSIS — K59.00 CONSTIPATION, UNSPECIFIED CONSTIPATION TYPE: ICD-10-CM

## 2017-09-05 DIAGNOSIS — S72.002A CLOSED FRACTURE OF NECK OF LEFT FEMUR, INITIAL ENCOUNTER (H): Primary | ICD-10-CM

## 2017-09-05 DIAGNOSIS — K59.01 SLOW TRANSIT CONSTIPATION: ICD-10-CM

## 2017-09-05 LAB
ANION GAP SERPL CALCULATED.3IONS-SCNC: 8 MMOL/L (ref 5–18)
BUN SERPL-MCNC: 23 MG/DL (ref 8–28)
CALCIUM SERPL-MCNC: 8.5 MG/DL (ref 8.5–10.5)
CHLORIDE SERPLBLD-SCNC: 105 MMOL/L (ref 98–107)
CO2 SERPL-SCNC: 24 MMOL/L (ref 22–31)
CREAT SERPL-MCNC: 1.1 MG/DL (ref 0.7–1.3)
ERYTHROCYTE [DISTWIDTH] IN BLOOD BY AUTOMATED COUNT: 12.5 % (ref 11–14.5)
GFR SERPL CREATININE-BSD FRML MDRD: >60 ML/MIN/1.73M2
GLUCOSE SERPL-MCNC: 141 MG/DL (ref 70–125)
HCT VFR BLD AUTO: 26.3 % (ref 40–54)
HEMOGLOBIN: 8.5 G/DL (ref 14–18)
MCH RBC QN AUTO: 31.7 PG (ref 27–34)
MCHC RBC AUTO-ENTMCNC: 32.3 G/DL (ref 32–36)
MCV RBC AUTO: 98 FL (ref 80–100)
PLATELET # BLD AUTO: 407 THOU/UL (ref 140–440)
POTASSIUM SERPL-SCNC: 4.3 MMOL/L (ref 3.5–5)
RBC # BLD AUTO: 2.68 MILL/UL (ref 4.4–6.2)
SODIUM SERPL-SCNC: 137 MMOL/L (ref 136–145)
WBC # BLD AUTO: 7.1 THOU/UL (ref 4–11)

## 2017-09-05 PROCEDURE — 99309 SBSQ NF CARE MODERATE MDM 30: CPT | Performed by: NURSE PRACTITIONER

## 2017-09-05 RX ORDER — OXYCODONE HYDROCHLORIDE 5 MG/1
5-10 TABLET ORAL
Refills: 0
Start: 2017-09-05 | End: 2018-01-22

## 2017-09-05 RX ORDER — AMOXICILLIN 250 MG
2 CAPSULE ORAL 2 TIMES DAILY PRN
Qty: 100 TABLET
Start: 2017-09-05 | End: 2018-01-22

## 2017-09-05 RX ORDER — POLYETHYLENE GLYCOL 3350 17 G/17G
17 POWDER, FOR SOLUTION ORAL DAILY PRN
Qty: 7 PACKET
Start: 2017-09-05 | End: 2017-09-19

## 2017-09-05 NOTE — PROGRESS NOTES
"[facility]    Chief Complaint   Patient presents with     RECHECK     HPI:    Amso Walker is a 72 year old  (1945), who is being seen today for an episodic care visit at [facility].  HPI information obtained from: facility chart records, facility staff, patient report and [facility] chart review. Today's concern is:    Left Femoral Neck Fracture S/P Left Total Hip Arthroplasty 8/27/17. Complains of pain the left hip. Feels he should not have to request pain medication and feels it should be given to him. Would like to receive prior to therapy. Upon review of documentation, has utilized Oxycodone 10 mg daily over the past 3 days.    Acute Blood Loss Anemia. Hemoglobin 8.5 on 9/5/17. Previous Hemoglobin 9.3 on 8/30/17.     Constipation. Complains of having bowel movements which are not of the right consistency. Denies diarrhea. Refused stool softeners this morning.    Tobacco Abuse. Stopped smoking cigarettes about 2 weeks ago after fall. Was smoking 1/2 ppd. Is now on no Nicotine replacement. Would prefer not to use Nicotine as it can be \"just as bad as the real stuff\". Feels he's doing ok without replacement.     Type 2 Diabetes Mellitus. Upon review of blood sugars since TCU admission, range is as follows:    Breakfast:   Lunch: 175-294  Dinner: 174-300  Bedtime: 202-476    ALLERGIES: Lisinopril; Neomycin; Methylchloroisothiazolinone [methylisothiazolinone]; and Povidone iodine  Past Medical, Surgical, Family and Social History reviewed and updated in [facility].    Current Outpatient Prescriptions   Medication Sig Dispense Refill     acetaminophen (TYLENOL) 500 MG tablet Take 2 tablets (1,000 mg) by mouth 3 times daily       WARFARIN SODIUM PO Take by mouth daily See facility INR for dosing       oxyCODONE (ROXICODONE) 5 MG IR tablet Take 1-2 tablets (5-10 mg) by mouth every 3 hours as needed for moderate to severe pain  0     insulin aspart (NOVOLOG PEN) 100 " UNIT/ML injection Inject 1-7 Units Subcutaneous 3 times daily (before meals) Do Not give Correction Insulin if Pre-Meal BG less than 140.    - 189 give 1 unit.    - 239 give 2 units.    - 289 give 3 units.    - 339 give 4 units.   - 399 give 5 units.   -449 give 6 units  BG greater than or equal to 450 give 7 units.       insulin aspart (NOVOLOG PEN) 100 UNIT/ML injection Inject 1-5 Units Subcutaneous At Bedtime Do Not give Bedtime Correction Insulin if BG less than  200.   For  - 249 give 1 units.   For  - 299 give 2 units.   For  - 349 give 3 units.   For  -399 give 4 units.   For BG greater than or equal to 400 give 5 units.  Notify provider if glucose greater than or equal to 350 mg/dL after administration of correction dose.       insulin glargine (LANTUS SOLOSTAR) 100 UNIT/ML injection Inject 25 Units Subcutaneous At Bedtime 9 mL 5     nateglinide (STARLIX) 120 MG tablet TAKE 1 TABLET BY MOUTH THREE TIMES DAILY BEFORE MEALS 90 tablet 11     sitagliptin (JANUVIA) 100 MG tablet Take 1 tablet (100 mg) by mouth daily 90 tablet 3     simvastatin (ZOCOR) 10 MG tablet Take 1 tablet (10 mg) by mouth At Bedtime 90 tablet 3     ammonium lactate (LAC-HYDRIN) 12 % cream Apply topically 2 times daily as needed for dry skin 385 g 3     gentamicin (GARAMYCIN) 0.1 % cream Apply topically daily To right leg ulcer. 30 g 5     hydrocortisone (WESTCORT) 0.2 % cream Apply sparingly to affected area twice times daily for 14 days. 15 g 3     silver sulfADIAZINE (SILVADENE) 1 % cream Apply topically daily To affected areas on right foot and leg. 85 g 5     ferrous sulfate (IRON) 325 (65 FE) MG tablet Take 1 tablet (325 mg) by mouth 2 times daily 60 tablet 11     polyethylene glycol (MIRALAX/GLYCOLAX) Packet Take 17 g by mouth daily 7 packet      senna-docusate (SENOKOT-S;PERICOLACE) 8.6-50 MG per tablet Take 2 tablets by mouth 2 times daily 100 tablet      calcium carbonate  "(OS-CLAUDIA 500 MG [place]. [place]) 1250 MG tablet Take 1 tablet (1,250 mg) by mouth 2 times daily (with meals) 90 tablet      clopidogrel (PLAVIX) 75 MG tablet Take 1 tablet (75 mg) by mouth daily 30 tablet 11     methocarbamol (ROBAXIN) 750 MG tablet Take 1 tablet (750 mg) by mouth 4 times daily 45 tablet      ascorbic acid 500 MG TABS Take 1 tablet (500 mg) by mouth 2 times daily 30 tablet      cholecalciferol 3000 UNITS TABS Take 3,000 Units by mouth daily 30 tablet      insulin pen needle (B-D U/F) 31G X 8 MM Use 1 daily o as directed 100 each 3     order for DME Please measure and distribute 1 pair of 30mmHg - 40mmHg knee high open toe ulcercare compression stockings. Jobst ultrasheer or equivalent. 2 each 6     blood glucose monitoring (ONE TOUCH ULTRA) test strip Use to test blood sugars 2 times daily or as directed. 60 strip 11     sildenafil (VIAGRA) 50 MG tablet Take 1 tablet (50 mg) by mouth daily as needed for erectile dysfunction 10 tablet 11     blood glucose monitoring (FREESTYLE) lancets Use to test blood sugars 2 as directed. 3 Box 3     order for DME Please measure and distribute 1 pair of 20mmHg - 30mmHg knee high open or closed toe compression stockings. Jobst ultrasheer or equivalent. 3 each 12     order for DME Please measure and distribute 1 pair of 20mm Hg - 30mm Hg knee high ULCER CARE open or closed toe compression stockings.   Patient has a size 13 foot and please take this into consideration.   Jobst or equivalent 2 each 1     Gauze Pads & Dressings (OPTIFOAM) 6\"X6\" PADS 1 Box once a week 1 each 6     Medications reviewed:  Medications reconciled to facility chart and changes were made to reflect current medications as identified as above med list. Below are the changes that were made:   Medications stopped since last EPIC medication reconciliation:   There are no discontinued medications.    Medications started since last [facility] medication reconciliation:  No orders of the defined types were placed " in this encounter.    REVIEW OF SYSTEMS:  4 point ROS including Respiratory, CV, GI and , other than that noted in the HPI,  is negative    Physical Exam:  /76  Pulse 76  Temp 97.9  F (36.6  C)  Resp 10  Wt 171 lb 6.4 oz (77.7 kg)  SpO2 96%  BMI 22.01 kg/m2  GENERAL APPEARANCE:  Alert, in no distress  ENT:  Mouth and posterior oropharynx normal, moist mucous membranes  EYES:  EOM, conjunctivae, lids, pupils and irises normal  RESP:  Respiratory effort and palpation of chest normal, lungs clear to auscultation, no respiratory distress  CV:  Palpation and auscultation of heart done, regular rate and rhythm, no murmur, rub, or gallop  ABDOMEN: Active bowel sounds, soft, nontender, no hepatosplenomegaly or other masses  M/S:   Active movement of bilateral upper and lower extremities. No edema.  SKIN:  Inspection of skin and subcutaneous tissue baseline, Palpation of skin and subcutaneous tissue baseline, Kerlix on right shin.  NEURO:   Cranial nerves 2-12 are normal tested and grossly at patient's baseline  PSYCH:  affect and mood normal    Recent Labs:    Last Basic Metabolic Panel:  Lab Results   Component Value Date     08/29/2017      Lab Results   Component Value Date    POTASSIUM 3.9 08/29/2017     Lab Results   Component Value Date    CHLORIDE 101 08/29/2017     Lab Results   Component Value Date    CLAUDIA 7.8 08/29/2017     Lab Results   Component Value Date    CO2 24 08/29/2017     Lab Results   Component Value Date    BUN 22 08/29/2017     Lab Results   Component Value Date    CR 1.12 08/29/2017     Lab Results   Component Value Date     08/29/2017     Lab Results   Component Value Date    WBC 7.1 09/05/2017     Lab Results   Component Value Date    RBC 2.68 09/05/2017     Lab Results   Component Value Date    HGB 8.5 09/05/2017     Lab Results   Component Value Date    HCT 26.3 09/05/2017     No components found for: MCT  Lab Results   Component Value Date    MCV 98 09/05/2017     Lab  Results   Component Value Date    MCH 31.7 09/05/2017     Lab Results   Component Value Date    MCHC 32.3 09/05/2017     Lab Results   Component Value Date    RDW 12.5 09/05/2017     Lab Results   Component Value Date     09/05/2017     Assessment/Plan:  Left Femoral Neck Fracture S/P Left Total Hip Arthroplasty 8/27/17 by Dr. Jackman. Follow-up with Dr. Jackman on 9/13/17 as scheduled. Will schedule Oxycodone at 0800 and 1300 prior to therapies in addition to q3h PRN per patient request. Acetaminophen and Methocarbamol scheduled for pain control. Calcium and Vitamin D order. Warfarin initiated for DVT prophylaxis x 4-6 weeks. INR 1.9 9/5/17. Increase Warfarin to 3 mg daily. Repeat INR on 9/7/17. Physical and Occupational Therapy ordered for deconditioning.    Acute Blood Loss Anemia. Secondary to above. Hemoglobin continues to trend down slowly. Will repeat in 1 week to ensure stability. Continue Ferrous Sulfate as ordered.    Constipation. Educated on side effect of constipation with Ferrous Sulfate and narcotic use. Also ambulating less secondary to above. Will change Senna-S and Miralax to PRN per patient request.    Tobacco Abuse. Commended on tobacco cessation for ~ 2 weeks. Encouraged to communicate if interested in Nicotine replacement.     Type 2 Diabetes Mellitus. Last A1C 6.7 on 6/16/17. Based on elevated blood sugars, will increase Glargine from 25 U to 28 U. Continue Nateglinide and Sitagliptin as ordered. Also on Insulin Aspart Sliding Scale.     Electronically signed by  LEW Montague CNP

## 2017-09-07 ENCOUNTER — NURSING HOME VISIT (OUTPATIENT)
Dept: GERIATRICS | Facility: CLINIC | Age: 72
End: 2017-09-07
Payer: MEDICARE

## 2017-09-07 VITALS
DIASTOLIC BLOOD PRESSURE: 72 MMHG | WEIGHT: 171.4 LBS | SYSTOLIC BLOOD PRESSURE: 132 MMHG | BODY MASS INDEX: 22.01 KG/M2 | TEMPERATURE: 97.7 F | OXYGEN SATURATION: 97 % | RESPIRATION RATE: 16 BRPM | HEART RATE: 79 BPM

## 2017-09-07 DIAGNOSIS — Z96.642 AFTERCARE FOLLOWING LEFT HIP JOINT REPLACEMENT SURGERY: Primary | ICD-10-CM

## 2017-09-07 DIAGNOSIS — N18.30 CKD (CHRONIC KIDNEY DISEASE) STAGE 3, GFR 30-59 ML/MIN (H): ICD-10-CM

## 2017-09-07 DIAGNOSIS — Z47.1 AFTERCARE FOLLOWING LEFT HIP JOINT REPLACEMENT SURGERY: Primary | ICD-10-CM

## 2017-09-07 DIAGNOSIS — I10 BENIGN ESSENTIAL HYPERTENSION: ICD-10-CM

## 2017-09-07 PROCEDURE — 99309 SBSQ NF CARE MODERATE MDM 30: CPT | Performed by: NURSE PRACTITIONER

## 2017-09-07 NOTE — PROGRESS NOTES
[facility]    Chief Complaint   Patient presents with     RECHECK     HPI:    Amos Walker is a 72 year old  (1945), who is being seen today for an episodic care visit at [facility].  HPI information obtained from: facility chart records, facility staff, patient report and [facility] chart review. Today's concern is:    Left Femoral Neck Fracture S/P Left Total Hip Arthroplasty 8/27/17 by Dr. Jackman. Feels pain is better controlled since Oxycodone was scheduled 2 times a day prior to therapies in addition to every 3 hours as needed. Upon review of documentation, utilizing Oxycodone PRN 1 time daily on average. Continues to work with Physical and Occupational Therapy. Supervision with bed mobility and sit-to-stand transfers. Ambulating 200 feet with FWW and supervision. Completed 12 steps with 1 rail and SBA. Able to stand 7 minutes and 15 seconds. SBA for toileting.     Hypertension. Upon review of blood pressures over the past 5 days, systolic range from 102-155. Diastolic range from 63-81.     Chronic Kidney Disease Stage III. Creatinine 1.10 on 9/5/17. Previous Creatinine 1.12 on 8/29/17.     ALLERGIES: Lisinopril; Neomycin; Methylchloroisothiazolinone [methylisothiazolinone]; and Povidone iodine  Past Medical, Surgical, Family and Social History reviewed and updated in [facility].    Current Outpatient Prescriptions   Medication Sig Dispense Refill     insulin glargine (LANTUS) 100 UNIT/ML injection Inject 28 Units Subcutaneous every morning       oxyCODONE (ROXICODONE) 5 MG IR tablet Take 1-2 tablets (5-10 mg) by mouth every 3 hours as needed for moderate to severe pain And scheduled Oxycodone 10 mg at 0800 and 1300 prior to therapies  0     polyethylene glycol (MIRALAX/GLYCOLAX) Packet Take 17 g by mouth daily as needed for constipation 7 packet      senna-docusate (SENOKOT-S;PERICOLACE) 8.6-50 MG per tablet Take 2 tablets by mouth 2 times daily as needed for  constipation 100 tablet      acetaminophen (TYLENOL) 500 MG tablet Take 2 tablets (1,000 mg) by mouth 3 times daily       WARFARIN SODIUM PO Take by mouth daily See facility INR for dosing       insulin aspart (NOVOLOG PEN) 100 UNIT/ML injection Inject 1-7 Units Subcutaneous 3 times daily (before meals) Do Not give Correction Insulin if Pre-Meal BG less than 140.    - 189 give 1 unit.    - 239 give 2 units.    - 289 give 3 units.    - 339 give 4 units.   - 399 give 5 units.   -449 give 6 units  BG greater than or equal to 450 give 7 units.       insulin aspart (NOVOLOG PEN) 100 UNIT/ML injection Inject 1-5 Units Subcutaneous At Bedtime Do Not give Bedtime Correction Insulin if BG less than  200.   For  - 249 give 1 units.   For  - 299 give 2 units.   For  - 349 give 3 units.   For  -399 give 4 units.   For BG greater than or equal to 400 give 5 units.  Notify provider if glucose greater than or equal to 350 mg/dL after administration of correction dose.       nateglinide (STARLIX) 120 MG tablet TAKE 1 TABLET BY MOUTH THREE TIMES DAILY BEFORE MEALS 90 tablet 11     sitagliptin (JANUVIA) 100 MG tablet Take 1 tablet (100 mg) by mouth daily 90 tablet 3     simvastatin (ZOCOR) 10 MG tablet Take 1 tablet (10 mg) by mouth At Bedtime 90 tablet 3     ammonium lactate (LAC-HYDRIN) 12 % cream Apply topically 2 times daily as needed for dry skin 385 g 3     gentamicin (GARAMYCIN) 0.1 % cream Apply topically daily To right leg ulcer. 30 g 5     hydrocortisone (WESTCORT) 0.2 % cream Apply sparingly to affected area twice times daily for 14 days. 15 g 3     silver sulfADIAZINE (SILVADENE) 1 % cream Apply topically daily To affected areas on right foot and leg. 85 g 5     ferrous sulfate (IRON) 325 (65 FE) MG tablet Take 1 tablet (325 mg) by mouth 2 times daily 60 tablet 11     calcium carbonate (OS-CLAUDIA 500 MG [place]. [place]) 1250 MG tablet Take 1 tablet (1,250 mg) by mouth 2  "times daily (with meals) 90 tablet      clopidogrel (PLAVIX) 75 MG tablet Take 1 tablet (75 mg) by mouth daily 30 tablet 11     methocarbamol (ROBAXIN) 750 MG tablet Take 1 tablet (750 mg) by mouth 4 times daily 45 tablet      ascorbic acid 500 MG TABS Take 1 tablet (500 mg) by mouth 2 times daily 30 tablet      cholecalciferol 3000 UNITS TABS Take 3,000 Units by mouth daily 30 tablet      insulin pen needle (B-D U/F) 31G X 8 MM Use 1 daily o as directed 100 each 3     order for DME Please measure and distribute 1 pair of 30mmHg - 40mmHg knee high open toe ulcercare compression stockings. Jobst ultrasheer or equivalent. 2 each 6     blood glucose monitoring (ONE TOUCH ULTRA) test strip Use to test blood sugars 2 times daily or as directed. 60 strip 11     sildenafil (VIAGRA) 50 MG tablet Take 1 tablet (50 mg) by mouth daily as needed for erectile dysfunction 10 tablet 11     blood glucose monitoring (FREESTYLE) lancets Use to test blood sugars 2 as directed. 3 Box 3     order for DME Please measure and distribute 1 pair of 20mmHg - 30mmHg knee high open or closed toe compression stockings. Jobst ultrasheer or equivalent. 3 each 12     order for DME Please measure and distribute 1 pair of 20mm Hg - 30mm Hg knee high ULCER CARE open or closed toe compression stockings.   Patient has a size 13 foot and please take this into consideration.   Jobst or equivalent 2 each 1     Gauze Pads & Dressings (OPTIFOAM) 6\"X6\" PADS 1 Box once a week 1 each 6     Medications reviewed:  Medications reconciled to facility chart and changes were made to reflect current medications as identified as above med list. Below are the changes that were made:   Medications stopped since last EPIC medication reconciliation:   There are no discontinued medications.    Medications started since last [facility] medication reconciliation:  No orders of the defined types were placed in this encounter.      REVIEW OF SYSTEMS:  4 point ROS including " Respiratory, CV, GI and , other than that noted in the HPI,  is negative    Physical Exam:  /72  Pulse 79  Temp 97.7  F (36.5  C)  Resp 16  Wt 171 lb 6.4 oz (77.7 kg)  SpO2 97%  BMI 22.01 kg/m2  GENERAL APPEARANCE:  Alert, in no distress  ENT:  Mouth and posterior oropharynx normal, moist mucous membranes  EYES:  EOM, conjunctivae, lids, pupils and irises normal  RESP:  respiratory effort and palpation of chest normal, lungs clear to auscultation , no respiratory distress  CV:  Palpation and auscultation of heart done , regular rate and rhythm, no murmur, rub, or gallop  ABDOMEN:  normal bowel sounds, soft, nontender, no hepatosplenomegaly or other masses  M/S:   Active movement of bilateral upper and lower extremities.  SKIN:  Inspection of skin and subcutaneous tissue baseline, Palpation of skin and subcutaneous tissue baseline. Kerlix on right shin.  NEURO:   Cranial nerves 2-12 are normal tested and grossly at patient's baseline  PSYCH:  affect and mood normal    Recent Labs:    Last Basic Metabolic Panel:  Lab Results   Component Value Date     09/05/2017      Lab Results   Component Value Date    POTASSIUM 4.3 09/05/2017     Lab Results   Component Value Date    CHLORIDE 105 09/05/2017     Lab Results   Component Value Date    CLAUDIA 8.5 09/05/2017     Lab Results   Component Value Date    CO2 24 09/05/2017     Lab Results   Component Value Date    BUN 23 09/05/2017     Lab Results   Component Value Date    CR 1.10 09/05/2017     Lab Results   Component Value Date     09/05/2017     Lab Results   Component Value Date    WBC 7.1 09/05/2017     Lab Results   Component Value Date    RBC 2.68 09/05/2017     Lab Results   Component Value Date    HGB 8.5 09/05/2017     Lab Results   Component Value Date    HCT 26.3 09/05/2017     Lab Results   Component Value Date    MCV 98 09/05/2017     Lab Results   Component Value Date    MCH 31.7 09/05/2017     Lab Results   Component Value Date    MCHC  32.3 09/05/2017     Lab Results   Component Value Date    RDW 12.5 09/05/2017     Lab Results   Component Value Date     09/05/2017     Assessment/Plan:  Left Femoral Neck Fracture S/P Left Total Hip Arthroplasty 8/27/17 by Dr. Jackman. Follow-up with Dr. Jackman on 9/13/17 as scheduled. Acetaminophen, Oxycodone and Methocarbamol for pain control. Calcium and Vitamin D order. Warfarin initiated for DVT prophylaxis x 4-6 weeks post-operatively. Will increase Warfarin to 4 mg nicholson due to INR 1.7 today with repeat INR 9/11/17. Physical and Occupational Therapy ordered for deconditioning.     Hypertension. Most blood pressures <140/90. On no antihypertensive medications.    Chronic Kidney Disease, Stage III. Baseline Creatinine ~ 1.1-1.3. Creatinine at baseline. Monitor periodically.     Electronically signed by  LEW Montague CNP

## 2017-09-11 ENCOUNTER — DISCHARGE SUMMARY NURSING HOME (OUTPATIENT)
Dept: GERIATRICS | Facility: CLINIC | Age: 72
End: 2017-09-11
Payer: MEDICARE

## 2017-09-11 VITALS
SYSTOLIC BLOOD PRESSURE: 154 MMHG | WEIGHT: 171.4 LBS | HEART RATE: 82 BPM | OXYGEN SATURATION: 97 % | RESPIRATION RATE: 18 BRPM | BODY MASS INDEX: 22.01 KG/M2 | TEMPERATURE: 98.2 F | DIASTOLIC BLOOD PRESSURE: 65 MMHG

## 2017-09-11 DIAGNOSIS — I73.9 PVD (PERIPHERAL VASCULAR DISEASE) (H): ICD-10-CM

## 2017-09-11 DIAGNOSIS — N18.30 CKD (CHRONIC KIDNEY DISEASE) STAGE 3, GFR 30-59 ML/MIN (H): ICD-10-CM

## 2017-09-11 DIAGNOSIS — Z72.0 TOBACCO ABUSE: ICD-10-CM

## 2017-09-11 DIAGNOSIS — E11.40 TYPE 2 DIABETES, CONTROLLED, WITH NEUROPATHY (H): ICD-10-CM

## 2017-09-11 DIAGNOSIS — L97.519: ICD-10-CM

## 2017-09-11 DIAGNOSIS — K59.01 SLOW TRANSIT CONSTIPATION: ICD-10-CM

## 2017-09-11 DIAGNOSIS — Z47.1 AFTERCARE FOLLOWING LEFT HIP JOINT REPLACEMENT SURGERY: Primary | ICD-10-CM

## 2017-09-11 DIAGNOSIS — I10 ESSENTIAL HYPERTENSION: ICD-10-CM

## 2017-09-11 DIAGNOSIS — R60.0 LOCALIZED EDEMA: ICD-10-CM

## 2017-09-11 DIAGNOSIS — D62 ANEMIA DUE TO BLOOD LOSS, ACUTE: ICD-10-CM

## 2017-09-11 DIAGNOSIS — Z96.642 AFTERCARE FOLLOWING LEFT HIP JOINT REPLACEMENT SURGERY: Primary | ICD-10-CM

## 2017-09-11 DIAGNOSIS — E78.5 HYPERLIPIDEMIA, UNSPECIFIED HYPERLIPIDEMIA TYPE: ICD-10-CM

## 2017-09-11 PROCEDURE — 99316 NF DSCHRG MGMT 30 MIN+: CPT | Performed by: NURSE PRACTITIONER

## 2017-09-13 ENCOUNTER — OFFICE VISIT (OUTPATIENT)
Dept: ORTHOPEDICS | Facility: CLINIC | Age: 72
End: 2017-09-13

## 2017-09-13 DIAGNOSIS — S72.002A CLOSED FRACTURE OF NECK OF LEFT FEMUR, INITIAL ENCOUNTER (H): Primary | ICD-10-CM

## 2017-09-13 ASSESSMENT — ACTIVITIES OF DAILY LIVING (ADL)
ADL_MEAN: 2
ADL_SUM: 34
ADL_SUBSCALE_SCORE: 50

## 2017-09-13 ASSESSMENT — HOOS S4: HOW SEVERE IS YOUR HIP JOINT STIFFNESS AFTER FIRST WAKENING IN THE MORNING?: MILD

## 2017-09-13 NOTE — PROGRESS NOTES
Interval History:   Amos Walker is a 72 year old male who is here follow up for:   Left AGUEDA - 8/27/2017    He has done well with regards to his hip since discharge from the hospital. Unfortunately, he developed a left heel ulcer. He has been seen by Dr. Chappell in the past for a right lower extremity ulcer. However, the left heel ulcer is new and he thinks it is a result of immobilization after the total hip replacement. He is having this cared for by the wound nurse at the TCU. Otherwise he has not had any issues with the left hip incision healing. He has some lateral thigh pain, but this seems to be improving. His ambulation is also improving and he is progressing from the walker to the cane quite well.         Physical Exam:     NAD  AOx3  Interactive and cooperative with the exam.    The incision is clean, dry, and healing well  Motor: GS/AT are intact  Sensation: intact diffusely throughout the foot.  No pain with passive range of motion of the operative extremity.    He has a 2 x 2 centimeter left heel ulcer. There is healthy pink underlying granulation tissue. No surrounding redness or concern for infection.       Imaging:      No new imaging       Assessment and Plan:        Amos is doing well following his left total hip replacement for femoral neck fracture. Unfortunately, he developed a left heel ulcer while sitting in bed postoperatively. He noticed this a couple days ago. We will work on getting him scheduled to be seen by Dr. Chappell, who he has seen in the past for a right heel ulcer. Otherwise reviewed the risk of dislocation and reviewed the risky positions. We noted that he could flex to 90  so long as he did not also internally rotate and adduct the leg. She will progress with activities as tolerated and will continue with weightbearing as tolerated. He can shower and should just let the water run over the wound. He should not soak the wound for another 2 weeks. He'll return to clinic  in 4 weeks. At that point we will obtain repeat low AP pelvis and crosstable lateral of the left hip.     Ish Jackman M.D.     Arthritis and Joint Replacement  Department of Orthopaedic Surgery, [facility]  [email or web address]  [phone] (pager)

## 2017-09-13 NOTE — MR AVS SNAPSHOT
After Visit Summary   9/13/2017    Amos Walker    MRN: [id number]           Patient Information     Date Of Birth          1945        Visit Information        Provider Department      9/13/2017 5:15 PM Ish Jackman MD [facility]        Today's Diagnoses     Closed fracture of neck of left femur, initial encounter (H)    -  1      Care Instructions         Interval History:   Amos Walker is a 72 year old male who is here follow up for:   Left AGUEDA - 8/27/2017    He has done well with regards to his hip since discharge from the hospital. Unfortunately, he developed a left heel ulcer. He has been seen by Dr. Chappell in the past for a right lower extremity ulcer. However, the left heel ulcer is new and he thinks it is a result of immobilization after the total hip replacement. He is having this cared for by the wound nurse at the TCU. Otherwise he has not had any issues with the left hip incision healing. He has some lateral thigh pain, but this seems to be improving. His ambulation is also improving and he is progressing from the walker to the cane quite well.         Physical Exam:     NAD  AOx3  Interactive and cooperative with the exam.    The incision is clean, dry, and healing well  Motor: GS/AT are intact  Sensation: intact diffusely throughout the foot.  No pain with passive range of motion of the operative extremity.    He has a 2 x 2 centimeter left heel ulcer. There is healthy pink underlying granulation tissue. No surrounding redness or concern for infection.       Imaging:      No new imaging       Assessment and Plan:        Amos is doing well following his left total hip replacement for femoral neck fracture. Unfortunately, he developed a left heel ulcer while sitting in bed postoperatively. He noticed this a couple days ago. We will work on getting him scheduled to be seen by Dr. Chappell, who he has seen in the past for a right heel ulcer. Otherwise  reviewed the risk of dislocation and reviewed the risky positions. We noted that he could flex to 90  so long as he did not also internally rotate and adduct the leg. She will progress with activities as tolerated and will continue with weightbearing as tolerated. He can shower and should just let the water run over the wound. He should not soak the wound for another 2 weeks. He'll return to clinic in 4 weeks. At that point we will obtain repeat low AP pelvis and crosstable lateral of the left hip.           Follow-ups after your visit        Your next 10 appointments already scheduled     Sep 18, 2017 12:40 PM CDT   (Arrive by 12:25 PM)   Return Visit with Velia Maier MD   [facility] ([facility])    [address] [place] [id number] [phone]            Oct 19, 2017 11:25 AM CDT   (Arrive by 11:10 AM)   Return Visit with Racheal Swift MD   [facility] ([facility])    [address] [place] [id number] [phone]            Jun 20, 2018 10:30 AM CDT   RETURN RETINA with Jen Aranda MD   [facility] ([facility])    [address]  [address]  [address] [address]  [facility] [id number] [phone]              Who to contact     Please call your clinic at [phone] to:    Ask questions about your health    Make or cancel appointments    Discuss your medicines    Learn about your test results    Speak to your doctor   If you have compliments or concerns about an experience at your clinic, or if you wish to file a complaint, please contact [facility] Patient Relations at [phone] or email us at [email or web address]         Additional Information About Your Visit        MyChart Information     [employer] gives you secure access to your electronic health record. If you see a primary  care provider, you can also send messages to your care team and make appointments. If you have questions, please call your primary care clinic.  If you do not have a primary care provider, please call [phone] and they will assist you.      [employer] is an electronic gateway that provides easy, online access to your medical records. With [employer], you can request a clinic appointment, read your test results, renew a prescription or communicate with your care team.     To access your existing account, please contact your [facility] or call [phone] for assistance.        Care EveryWhere ID     This is your Care EveryWhere ID. This could be used by other organizations to access your [place] medical records  [id number]         Blood Pressure from Last 3 Encounters:   09/11/17 154/65   09/07/17 132/72   09/05/17 146/76    Weight from Last 3 Encounters:   09/11/17 77.7 kg (171 lb 6.4 oz)   09/07/17 77.7 kg (171 lb 6.4 oz)   09/05/17 77.7 kg (171 lb 6.4 oz)              Today, you had the following     No orders found for display       Primary Care Provider Office Phone # Fax #    Racheal Swift [phone]       [address] [place] [id number]        Equal Access to Services     SAMSON MELTON : Hadii niurka ku hadasho Soomaali, waaxda luqadaha, qaybta kaalmada adeegyada, yolanda burk hayhollie duran . So [facility] [phone].    ATENCIÓN: Si habla español, tiene a rodrigues disposición servicios gratuitos de asistencia lingüística. Llame al [phone].    We comply with applicable federal civil rights laws and [place] laws. We do not discriminate on the basis of race, color, national origin, age, disability sex, sexual orientation or gender identity.            Thank you!     Thank you for choosing [facility]  for your care. Our goal is always to provide you with excellent care. Hearing back from our patients is one way we can  continue to improve our services. Please take a few minutes to complete the written survey that you may receive in the mail after your visit with us. Thank you!             Your Updated Medication List - Protect others around you: Learn how to safely use, store and throw away your medicines at www.disposemymeds.org.          This list is accurate as of: 9/13/17  5:51 PM.  Always use your most recent med list.                   Brand Name Dispense Instructions for use Diagnosis    acetaminophen 500 MG tablet    TYLENOL     Take 2 tablets (1,000 mg) by mouth 3 times daily    Closed fracture of neck of right femur, initial encounter (H)       ammonium lactate 12 % cream    LAC-HYDRIN    385 g    Apply topically 2 times daily as needed for dry skin    Venous stasis, Type 2 diabetes, controlled, with neuropathy (H)       ascorbic acid 500 MG Tabs     30 tablet    Take 1 tablet (500 mg) by mouth 2 times daily    Ulcer of right lower leg, with fat layer exposed (H)       blood glucose monitoring lancets     3 Box    Use to test blood sugars 2 as directed.    Type 2 diabetes, uncontrolled, with neuropathy (H)       blood glucose monitoring test strip    ONE TOUCH ULTRA    60 strip    Use to test blood sugars 2 times daily or as directed.    Type 2 diabetes mellitus (H)       calcium carbonate 1250 MG tablet    OS-CLAUDIA 500 mg [place]. [place]    90 tablet    Take 1 tablet (1,250 mg) by mouth 2 times daily (with meals)    Closed fracture of neck of right femur, initial encounter (H)       Cholecalciferol 3000 UNITS Tabs     30 tablet    Take 3,000 Units by mouth daily    Closed fracture of neck of right femur, initial encounter (H)       clopidogrel 75 MG tablet    PLAVIX    30 tablet    Take 1 tablet (75 mg) by mouth daily    Peripheral vascular disease, unspecified (H)       ferrous sulfate 325 (65 FE) MG tablet    IRON    60 tablet    Take 1 tablet (325 mg) by mouth 2 times daily    Peripheral vascular disease, unspecified (H)     "   gentamicin 0.1 % cream    GARAMYCIN    30 g    Apply topically daily To right leg ulcer.    Ulcer of right lower leg, with fat layer exposed (H), Chronic venous hypertension with ulcer involving right side (H), Type 2 diabetes, controlled, with neuropathy (H)       hydrocortisone 0.2 % cream    WESTCORT    15 g    Apply sparingly to affected area twice times daily for 14 days.    Dermatitis       insulin glargine 100 UNIT/ML injection    LANTUS     Inject 28 Units Subcutaneous every morning        insulin pen needle 31G X 8 MM    B-D U/F    100 each    Use 1 daily o as directed    Diabetes mellitus, type II (H)       methocarbamol 750 MG tablet    ROBAXIN    45 tablet    Take 1 tablet (750 mg) by mouth 4 times daily    Closed fracture of neck of right femur, initial encounter (H)       nateglinide 120 MG tablet    STARLIX    90 tablet    TAKE 1 TABLET BY MOUTH THREE TIMES DAILY BEFORE MEALS    Type 2 diabetes, controlled, with neuropathy (H)       OPTIFOAM 6\"X6\" Pads     1 each    1 Box once a week    Ulcer of right leg, with fat layer exposed (H)       * order for DME     2 each    Please measure and distribute 1 pair of 20mm Hg - 30mm Hg knee high ULCER CARE open or closed toe compression stockings.  Patient has a size 13 foot and please take this into consideration.  Jobst or equivalent    Varicose veins of lower extremities with other complications, Venous stasis ulcer of right lower extremity (H)       * order for DME     3 each    Please measure and distribute 1 pair of 20mmHg - 30mmHg knee high open or closed toe compression stockings. Jobst ultrasheer or equivalent.    Varicose veins of both lower extremities with complications       * order for DME     2 each    Please measure and distribute 1 pair of 30mmHg - 40mmHg knee high open toe ulcercare compression stockings. Jobst ultrasheer or equivalent.    Varicose veins of bilateral lower extremities with other complications       oxyCODONE 5 MG IR tablet "    ROXICODONE     Take 1-2 tablets (5-10 mg) by mouth every 3 hours as needed for moderate to severe pain And scheduled Oxycodone 10 mg at 0800 and 1300 prior to therapies    Closed fracture of neck of right femur, initial encounter (H)       polyethylene glycol Packet    MIRALAX/GLYCOLAX    7 packet    Take 17 g by mouth daily as needed for constipation    Constipation, unspecified constipation type       senna-docusate 8.6-50 MG per tablet    SENOKOT-S;PERICOLACE    100 tablet    Take 2 tablets by mouth 2 times daily as needed for constipation    Constipation, unspecified constipation type       sildenafil 50 MG cap/tab    REVATIO/VIAGRA    10 tablet    Take 1 tablet (50 mg) by mouth daily as needed for erectile dysfunction    Vasculogenic erectile dysfunction, unspecified vasculogenic erectile dysfunction type       silver sulfADIAZINE 1 % cream    SILVADENE    85 g    Apply topically daily To affected areas on right foot and leg.    Ulcer of right lower leg, with fat layer exposed (H), Chronic venous hypertension with ulcer involving right side (H), Type 2 diabetes, controlled, with neuropathy (H)       simvastatin 10 MG tablet    ZOCOR    90 tablet    Take 1 tablet (10 mg) by mouth At Bedtime    Type 2 diabetes, controlled, with neuropathy (H)       sitagliptin 100 MG tablet    JANUVIA    90 tablet    Take 1 tablet (100 mg) by mouth daily    Type 2 diabetes, controlled, with neuropathy (H)       WARFARIN SODIUM PO      Take by mouth daily See facility discharge instructions for current INR dose. Also check TCU discharge summary        * Notice:  This list has 3 medication(s) that are the same as other medications prescribed for you. Read the directions carefully, and ask your doctor or other care provider to review them with you.

## 2017-09-13 NOTE — PATIENT INSTRUCTIONS
Interval History:   Amos Walker is a 72 year old male who is here follow up for:   Left AGUEDA - 8/27/2017    He has done well with regards to his hip since discharge from the hospital. Unfortunately, he developed a left heel ulcer. He has been seen by Dr. Chappell in the past for a right lower extremity ulcer. However, the left heel ulcer is new and he thinks it is a result of immobilization after the total hip replacement. He is having this cared for by the wound nurse at the [facility]. Otherwise he has not had any issues with the left hip incision healing. He has some lateral thigh pain, but this seems to be improving. His ambulation is also improving and he is progressing from the walker to the cane quite well.         Physical Exam:     NAD  AOx3  Interactive and cooperative with the exam.    The incision is clean, dry, and healing well  Motor: GS/AT are intact  Sensation: intact diffusely throughout the foot.  No pain with passive range of motion of the operative extremity.    He has a 2 x 2 centimeter left heel ulcer. There is healthy pink underlying granulation tissue. No surrounding redness or concern for infection.       Imaging:      No new imaging       Assessment and Plan:        Amos is doing well following his left total hip replacement for femoral neck fracture. Unfortunately, he developed a left heel ulcer while sitting in bed postoperatively. He noticed this a couple days ago. We will work on getting him scheduled to be seen by Dr. Chappell, who he has seen in the past for a right heel ulcer. Otherwise reviewed the risk of dislocation and reviewed the risky positions. We noted that he could flex to 90  so long as he did not also internally rotate and adduct the leg. She will progress with activities as tolerated and will continue with weightbearing as tolerated. He can shower and should just let the water run over the wound. He should not soak the wound for another 2 weeks. He'll return to clinic in  4 weeks. At that point we will obtain repeat low AP pelvis and crosstable lateral of the left hip.

## 2017-09-13 NOTE — NURSING NOTE
"Reason For Visit:   Chief Complaint   Patient presents with     Surgical Followup     DOS 8/27/17 S/P Left Total Hip Replacement       Primary MD: Racheal Swift    Pain Assessment  Patient Currently in Pain: Yes  0-10 Pain Scale: 4  Primary Pain Location: Hip  Pain Orientation: Left  Pain Descriptors: Aching  Alleviating Factors: Pain medication      Current Outpatient Prescriptions   Medication     insulin glargine (LANTUS) 100 UNIT/ML injection     oxyCODONE (ROXICODONE) 5 MG IR tablet     polyethylene glycol (MIRALAX/GLYCOLAX) Packet     senna-docusate (SENOKOT-S;PERICOLACE) 8.6-50 MG per tablet     acetaminophen (TYLENOL) 500 MG tablet     WARFARIN SODIUM PO     nateglinide (STARLIX) 120 MG tablet     sitagliptin (JANUVIA) 100 MG tablet     simvastatin (ZOCOR) 10 MG tablet     ammonium lactate (LAC-HYDRIN) 12 % cream     gentamicin (GARAMYCIN) 0.1 % cream     hydrocortisone (WESTCORT) 0.2 % cream     silver sulfADIAZINE (SILVADENE) 1 % cream     ferrous sulfate (IRON) 325 (65 FE) MG tablet     calcium carbonate (OS-CLAUDIA 500 MG [place]. [place]) 1250 MG tablet     clopidogrel (PLAVIX) 75 MG tablet     methocarbamol (ROBAXIN) 750 MG tablet     ascorbic acid 500 MG TABS     cholecalciferol 3000 UNITS TABS     insulin pen needle (B-D U/F) 31G X 8 MM     order for DME     blood glucose monitoring (ONE TOUCH ULTRA) test strip     sildenafil (VIAGRA) 50 MG tablet     blood glucose monitoring (FREESTYLE) lancets     order for DME     order for DME     Gauze Pads & Dressings (OPTIFOAM) 6\"X6\" PADS     No current facility-administered medications for this visit.           Allergies   Allergen Reactions     Lisinopril Other (See Comments)     dizziness     Neomycin Other (See Comments)     Wound gets worse     Methylchloroisothiazolinone [Methylisothiazolinone] Rash     Povidone Iodine Rash     "

## 2017-09-13 NOTE — LETTER
9/13/2017       RE: Amos Walker  [address]  [place] [address]     Dear Colleague,    Thank you for referring your patient, Amos Walker, to the [facility] at [facility]. Please see a copy of my visit note below.           Interval History:   Amos Walker is a 72 year old male who is here follow up for:   Left AGUEDA - 8/27/2017    He has done well with regards to his hip since discharge from the hospital. Unfortunately, he developed a left heel ulcer. He has been seen by Dr. Chappell in the past for a right lower extremity ulcer. However, the left heel ulcer is new and he thinks it is a result of immobilization after the total hip replacement. He is having this cared for by the wound nurse at the TCU. Otherwise he has not had any issues with the left hip incision healing. He has some lateral thigh pain, but this seems to be improving. His ambulation is also improving and he is progressing from the walker to the cane quite well.         Physical Exam:     NAD  AOx3  Interactive and cooperative with the exam.    The incision is clean, dry, and healing well  Motor: GS/AT are intact  Sensation: intact diffusely throughout the foot.  No pain with passive range of motion of the operative extremity.    He has a 2 x 2 centimeter left heel ulcer. There is healthy pink underlying granulation tissue. No surrounding redness or concern for infection.       Imaging:      No new imaging       Assessment and Plan:        Amos is doing well following his left total hip replacement for femoral neck fracture. Unfortunately, he developed a left heel ulcer while sitting in bed postoperatively. He noticed this a couple days ago. We will work on getting him scheduled to be seen by Dr. Chappell, who he has seen in the past for a right heel ulcer. Otherwise reviewed the risk of dislocation and reviewed the risky positions. We noted that he could flex to 90  so long as he did  not also internally rotate and adduct the leg. She will progress with activities as tolerated and will continue with weightbearing as tolerated. He can shower and should just let the water run over the wound. He should not soak the wound for another 2 weeks. He'll return to clinic in 4 weeks. At that point we will obtain repeat low AP pelvis and crosstable lateral of the left hip.     Again, thank you for allowing me to participate in the care of your patient.      Sincerely,    Ish Jackman MD

## 2017-09-14 ENCOUNTER — TRANSFERRED RECORDS (OUTPATIENT)
Dept: HEALTH INFORMATION MANAGEMENT | Facility: CLINIC | Age: 72
End: 2017-09-14

## 2017-09-14 LAB
ERYTHROCYTE [DISTWIDTH] IN BLOOD BY AUTOMATED COUNT: 13 % (ref 11–14.5)
HCT VFR BLD AUTO: 25.8 % (ref 40–54)
HEMOGLOBIN: 8.1 G/DL (ref 14–18)
MCH RBC QN AUTO: 30.9 PG (ref 27–34)
MCHC RBC AUTO-ENTMCNC: 31.4 G/DL (ref 32–36)
MCV RBC AUTO: 99 FL (ref 80–100)
PLATELET # BLD AUTO: 358 THOU/UL (ref 140–440)
RBC # BLD AUTO: 2.62 MILL/UL (ref 4.4–6.2)
WBC # BLD AUTO: 7.4 THOU/UL (ref 4–11)

## 2017-09-19 ENCOUNTER — OFFICE VISIT (OUTPATIENT)
Dept: INTERNAL MEDICINE | Facility: CLINIC | Age: 72
End: 2017-09-19

## 2017-09-19 VITALS — HEART RATE: 99 BPM | SYSTOLIC BLOOD PRESSURE: 131 MMHG | DIASTOLIC BLOOD PRESSURE: 78 MMHG | RESPIRATION RATE: 16 BRPM

## 2017-09-19 DIAGNOSIS — Z96.642 H/O TOTAL HIP ARTHROPLASTY, LEFT: Primary | ICD-10-CM

## 2017-09-19 DIAGNOSIS — M79.89 LEG SWELLING: ICD-10-CM

## 2017-09-19 DIAGNOSIS — Z12.11 SCREEN FOR COLON CANCER: ICD-10-CM

## 2017-09-19 DIAGNOSIS — Z23 NEED FOR PROPHYLACTIC VACCINATION AND INOCULATION AGAINST INFLUENZA: ICD-10-CM

## 2017-09-19 DIAGNOSIS — Z96.642 H/O TOTAL HIP ARTHROPLASTY, LEFT: ICD-10-CM

## 2017-09-19 DIAGNOSIS — E11.621 DIABETIC ULCER OF LEFT HEEL ASSOCIATED WITH TYPE 2 DIABETES MELLITUS, LIMITED TO BREAKDOWN OF SKIN (H): ICD-10-CM

## 2017-09-19 DIAGNOSIS — L97.421 DIABETIC ULCER OF LEFT HEEL ASSOCIATED WITH TYPE 2 DIABETES MELLITUS, LIMITED TO BREAKDOWN OF SKIN (H): ICD-10-CM

## 2017-09-19 LAB
ALBUMIN UR-MCNC: NEGATIVE MG/DL
APPEARANCE UR: CLEAR
BILIRUB UR QL STRIP: NEGATIVE
COLOR UR AUTO: ABNORMAL
GLUCOSE UR STRIP-MCNC: NEGATIVE MG/DL
HGB UR QL STRIP: NEGATIVE
INR PPP: 1.58 (ref 0.86–1.14)
KETONES UR STRIP-MCNC: NEGATIVE MG/DL
LEUKOCYTE ESTERASE UR QL STRIP: NEGATIVE
MUCOUS THREADS #/AREA URNS LPF: PRESENT /LPF
NITRATE UR QL: NEGATIVE
PH UR STRIP: 5 PH (ref 5–7)
RBC #/AREA URNS AUTO: 1 /HPF (ref 0–2)
SOURCE: ABNORMAL
SP GR UR STRIP: 1.02 (ref 1–1.03)
UROBILINOGEN UR STRIP-MCNC: 0 MG/DL (ref 0–2)
WBC #/AREA URNS AUTO: <1 /HPF (ref 0–2)

## 2017-09-19 ASSESSMENT — PAIN SCALES - GENERAL: PAINLEVEL: MODERATE PAIN (4)

## 2017-09-19 NOTE — PROGRESS NOTES
[facility]       SUBJECTIVE:  Amos Walker is a 72 year old male with a PMHx of PAD, T2DM, CKD 3, htn, hld, and recent left hip arthrooplasty who comes in for post-hospitalization follow up. He had a fall which led to a left hip arthroplasty on 8/27/17 after which he has had an uneventful recovery and rehab stay. Pain has been controlled with methocarbamol and oxycodone and has been decreasing overall, with minimal pain medication use per the patient. There was an issue of loose stools during the hospitalization which have resolved and now he has daily formed stools and PRN miralax as needed. He had a complication where he developed a left heel ulcer due to immobility after the surgery. He has a venous insufficiency ulcer on his right shin that is already getting followed by Dr. Chappell but he has had problems getting an appointment. His left heel ulcer has only been getting dry wound dressings. Denies fevers or chills. He denies having taken his blood sugars since discharge. He has been started on warfarin but on reviewing outside records it is unclear what his INR goal is. Assuming 2-3 for orthopedic surgery. Duration is 4-6 weeks. He also was taken off the lisinopril/HCTZ and aspirin before surgery and not restarted on it. He also notes urinating a lot more frequently, but denies hesitancy or weak stream, burning or other discomfort. He states that he needs his INR checked today. He also states that he has had leg swelling since being in rehab, and was seen by the surgeon on Wednesday who said that swelling distal to the surgery is normal to a certain extent. He has also been on warfarin through this time.       Medications and allergies reviewed by me today.     ROS:   10 point ROS is reviewed, and except pertinent positives and negatives in the HPI, is otherwise negative.     OBJECTIVE:    /78 (BP Location: Right arm, Patient Position: Sitting, Cuff Size: Adult Regular)   Pulse 99  Resp 16   Wt Readings from Last 1 Encounters:   09/11/17 77.7 kg (171 lb 6.4 oz)       GENERAL APPEARANCE: healthy, alert and no distress, arrived with walked     EYES: EOMI,  PERRL     NECK: no adenopathy     RESP: lungs clear to auscultation - no rales, rhonchi or wheezes     CV: regular rates and rhythm, normal S1 S2, no S3 or S4 and no murmur, click or rub     ABDOMEN:  soft, nontender, non-distended.      MS: scar noted over left hip, healing well, no erythema, drainage, no echymoses. Swelling of left lower extremity with taut skin.      SKIN: Ulcer on left heel which is not involving subcutaneous fat, is open and wet. Also lesion on right shin which is covered in a bandage and draining partially. Bandage is dry and adhered to wound.        ASSESSMENT/PLAN:    Amos was seen today for hospital f/u.    Diagnoses and all orders for this visit:    H/O total hip arthroplasty, left  -     INR CLINIC REFERRAL  -     UA with Micro reflex to Culture; Future    Screen for colon cancer    Need for prophylactic vaccination and inoculation against influenza  -     FLU VACCINE, INCREASED ANTIGEN, PRESV FREE, AGE 65+ [75043]    Leg swelling  -     US Lower Extremity Venous Duplex Left; Future  -     INR    Other orders  -     Cancel: GASTROENTEROLOGY ADULT REF PROCEDURE ONLY     #Hypertension- will continue to hold his antihypertensives as he is normotensive now and will reassess at PCP apt in 1 month.     ##pain management- this will be managed by surgery. Has oxycodone and methocarbamol and seems to be managed well.     #Diabetes- states in rehab his sugars were all over the place but he hasn't checked them since being home. Asked him to check them daily and record, and bring this data to next apt in 1 month    # lower extremity ulcers- has had trouble getting in to clinic with Dr. Chappell who sees him for his right shin ulcer, wants both of his ulcers- left heel and right shin looked at. Wound looks like stage  2 pressure ulcer, non-infected.   - Wound care by our nurses today to rebandage  - Referral to wound care clinic   - Encouraged to keep trying to schedule with Dr. Chappell.     #Urinary frequency- this seems to be unrelated to the prostate, he states he is waking up more often and therefore going to the bathroom more frequently, but he doesn't have a weak stream and technically could hold it if he needed to. No other symptoms concerning for UTI, but he is 72 years old and post-surgical, so would not want to miss a UTI.   - UA with reflex to culture    #left lower extremity swelling- most likely this is post surgical, however due to his INR having been less stable and him missing one dose of warfarin since discharge from rehab, will favor following up with a venous doppler.   - LE venous dopper    #anticoagulation by warfarin- Will schedule with our INR clinic, also will check INR today.     #tobacco use- did no smoke for 20 days but had 3 cigarettes yesterday. Counseled on quitting fully.     Patient has a follow up appointment already scheduled with Dr. Swift in October in about 4 weeks.     Manuel Suárez MD  Sep 19, 2017    Pt was seen and plan of care discussed with Dr. Swift.     Attending Addendum:  Patient seen and examined with resident in clinic today.  Pertinent portions of history and exam were independently verified by myself.  I agree with the exam and plan as outlined above with the following modifications: none.  Racheal Swift MD  Internal Medicine

## 2017-09-19 NOTE — NURSING NOTE
Chief Complaint   Patient presents with     Hospital F/U     Patient is here to follow up from FRANCISCO Rivera CMA 2:22 PM on 9/19/2017.

## 2017-09-19 NOTE — MR AVS SNAPSHOT
After Visit Summary   9/19/2017    Amos Walker    MRN: [id number]           Patient Information     Date Of Birth          1945        Visit Information        Provider Department      9/19/2017 2:05 PM Manuel Suárez MD [facility]        Today's Diagnoses     H/O total hip arthroplasty, left    -  1    Screen for colon cancer        Need for prophylactic vaccination and inoculation against influenza        Leg swelling        Diabetic ulcer of left heel associated with type 2 diabetes mellitus, limited to breakdown of skin (H)          Care Instructions    Primary Care Center: [phone]     Primary Care Center Medication Refill Request Information:  * Please contact your pharmacy regarding ANY request for medication refills.  ** [facility] Prescription Fax = [phone]  * Please allow 3 business days for routine medication refills.  * Please allow 5 business days for controlled substance medication refills.     Primary Care Center Test Result notification information:  *You will be notified with in 7-10 days of your appointment day regarding the results of your test.  If you are on MyChart you will be notified as soon as the provider has reviewed the results and signed off on them.            Follow-ups after your visit        Additional Services     INR CLINIC REFERRAL           WOUND CARE REFERRAL       Your provider has referred you to: [facility] [phone]   [email or web address]    Reason for referral: Wound care      1. [facility] Wound Consult appointment is related to what kind of wound: Pressure Ulcer    2. Location of wound: Lower extremity    3. Reason for referral: Assess and treat as indicated    4. Desired treatment if any: Per WOC nurse     Please be aware that coverage of these services is subject to the terms and limitations of your health insurance plan.  Call member services at  your health plan with any benefit or coverage questions.      Please bring the following with you to your appointment:    (1) Any X-Rays, CTs or MRIs which have been performed.  Contact the facility where they were done to arrange for  prior to your scheduled appointment.    (2) List of current medications   (3) This referral request   (4) Any documents/labs given to you for this referral                  Your next 10 appointments already scheduled     Sep 19, 2017  6:00 PM CDT   US LOWER EXTREMITY VENOUS DUPLEX LEFT with [id number]   [facility] ([facility] [place])    [address] [place] [address]  1st [facility] [id number] [phone]           Please bring a list of your medicines (including vitamins, minerals and over-the-counter drugs). Also, tell your doctor about any allergies you may have. Wear comfortable clothes and leave your valuables at home.  You do not need to do anything special to prepare for your exam.  Please call the Imaging Department at your exam site with any questions.            Sep 28, 2017  2:30 PM CDT   (Arrive by 2:15 PM)   New Patient Visit with Franchesca Alegre PA-C   [facility] ([place] [facility])    [address] [place] [id number] [phone]            Oct 02, 2017 12:30 PM CDT   Return Visit with Brayan Mcclain DPM   [facility]    [address] [place] [id number] [phone]            Oct 19, 2017 11:25 AM CDT   (Arrive by 11:10 AM)   Return Visit with Racheal Swift MD   [facility] ([place] [facility])    [address] [place] [id number]   [phone]            Jun 20, 2018 10:30 AM CDT   RETURN RETINA with Jen Aranda MD   [facility] ([facility])    [place] [address]  [address]  [address]   [phone]              Future tests that were ordered for you today     Open Future Orders        Priority Expected Expires Ordered    UA with Micro reflex to Culture Routine 9/19/2017 9/19/2018 9/19/2017     Lower Extremity Venous Duplex Left Routine  9/19/2018 9/19/2017            Who to contact     Please call your clinic at [phone] to:    Ask questions about your health    Make or cancel appointments    Discuss your medicines    Learn about your test results    Speak to your doctor   If you have compliments or concerns about an experience at your clinic, or if you wish to file a complaint, please contact [facility] Patient Relations at [phone] or email us at [email or web address]         Additional Information About Your Visit        The[employer]ste[employer] Information     Visante gives you secure access to your electronic health record. If you see a primary care provider, you can also send messages to your care team and make appointments. If you have questions, please call your primary care clinic.  If you do not have a primary care provider, please call [phone] and they will assist you.      Visante is an electronic gateway that provides easy, online access to your medical records. With Visante, you can request a clinic appointment, read your test results, renew a prescription or communicate with your care team.     To access your existing account, please contact your [facility] or call [phone] for assistance.        Care EveryWhere ID     This is your Care EveryWhere ID. This could be used by other organizations to access your [place] medical records  [id number]        Your Vitals Were     Pulse Respirations                99 16           Blood Pressure from Last 3 Encounters:   09/19/17 131/78   09/11/17 154/65   09/07/17 132/72    Weight from Last 3 Encounters:   09/11/17 77.7 kg (171 lb 6.4 oz)   09/07/17 77.7 kg  (171 lb 6.4 oz)   09/05/17 77.7 kg (171 lb 6.4 oz)              We Performed the Following     FLU VACCINE, INCREASED ANTIGEN, PRESV FREE, AGE 65+ [83550]     INR CLINIC REFERRAL     INR     WOUND CARE REFERRAL          Today's Medication Changes          These changes are accurate as of: 9/19/17  5:39 PM.  If you have any questions, ask your nurse or doctor.               Stop taking these medicines if you haven't already. Please contact your care team if you have questions.     polyethylene glycol Packet   Commonly known as:  MIRALAX/GLYCOLAX   Stopped by:  Manuel Suárez MD                    Primary Care Provider Office Phone # Fax #    Racheal Swift [phone]       [address] [place] [id number]        Equal Access to Services     SAMSON MELTON : Hadii niurka ferrell Soger, waaxda luqadaha, qaybta kaalmada jose d, yolanda duran . So [facility] [phone].    ATENCIÓN: Si habla español, tiene a rodrigues disposición servicios gratuitos de asistencia lingüística. Llame al [phone].    We comply with applicable federal civil rights laws and [place] laws. We do not discriminate on the basis of race, color, national origin, age, disability sex, sexual orientation or gender identity.            Thank you!     Thank you for choosing [facility]  for your care. Our goal is always to provide you with excellent care. Hearing back from our patients is one way we can continue to improve our services. Please take a few minutes to complete the written survey that you may receive in the mail after your visit with us. Thank you!             Your Updated Medication List - Protect others around you: Learn how to safely use, store and throw away your medicines at www.disposemymeds.org.          This list is accurate as of: 9/19/17  5:39 PM.  Always use your most recent med list.                   Brand Name Dispense Instructions for use  Diagnosis    acetaminophen 500 MG tablet    TYLENOL     Take 2 tablets (1,000 mg) by mouth 3 times daily    Closed fracture of neck of right femur, initial encounter (H)       ammonium lactate 12 % cream    LAC-HYDRIN    385 g    Apply topically 2 times daily as needed for dry skin    Venous stasis, Type 2 diabetes, controlled, with neuropathy (H)       ascorbic acid 500 MG Tabs     30 tablet    Take 1 tablet (500 mg) by mouth 2 times daily    Ulcer of right lower leg, with fat layer exposed (H)       blood glucose monitoring lancets     3 Box    Use to test blood sugars 2 as directed.    Type 2 diabetes, uncontrolled, with neuropathy (H)       blood glucose monitoring test strip    ONE TOUCH ULTRA    60 strip    Use to test blood sugars 2 times daily or as directed.    Type 2 diabetes mellitus (H)       calcium carbonate 1250 MG tablet    OS-CLAUDIA 500 mg [place]. Ca    90 tablet    Take 1 tablet (1,250 mg) by mouth 2 times daily (with meals)    Closed fracture of neck of right femur, initial encounter (H)       Cholecalciferol 3000 UNITS Tabs     30 tablet    Take 3,000 Units by mouth daily    Closed fracture of neck of right femur, initial encounter (H)       clopidogrel 75 MG tablet    PLAVIX    30 tablet    Take 1 tablet (75 mg) by mouth daily    Peripheral vascular disease, unspecified (H)       ferrous sulfate 325 (65 FE) MG tablet    IRON    60 tablet    Take 1 tablet (325 mg) by mouth 2 times daily    Peripheral vascular disease, unspecified (H)       gentamicin 0.1 % cream    GARAMYCIN    30 g    Apply topically daily To right leg ulcer.    Ulcer of right lower leg, with fat layer exposed (H), Chronic venous hypertension with ulcer involving right side (H), Type 2 diabetes, controlled, with neuropathy (H)       hydrocortisone 0.2 % cream    WESTCORT    15 g    Apply sparingly to affected area twice times daily for 14 days.    Dermatitis       insulin glargine 100 UNIT/ML injection    LANTUS     Inject 28 Units  "Subcutaneous every morning        insulin pen needle 31G X 8 MM    B-D U/F    100 each    Use 1 daily o as directed    Diabetes mellitus, type II (H)       methocarbamol 750 MG tablet    ROBAXIN    45 tablet    Take 1 tablet (750 mg) by mouth 4 times daily    Closed fracture of neck of right femur, initial encounter (H)       nateglinide 120 MG tablet    STARLIX    90 tablet    TAKE 1 TABLET BY MOUTH THREE TIMES DAILY BEFORE MEALS    Type 2 diabetes, controlled, with neuropathy (H)       OPTIFOAM 6\"X6\" Pads     1 each    1 Box once a week    Ulcer of right leg, with fat layer exposed (H)       * order for DME     2 each    Please measure and distribute 1 pair of 20mm Hg - 30mm Hg knee high ULCER CARE open or closed toe compression stockings.  Patient has a size 13 foot and please take this into consideration.  [place]st or equivalent    Varicose veins of lower extremities with other complications, Venous stasis ulcer of right lower extremity (H)       * order for DME     3 each    Please measure and distribute 1 pair of 20mmHg - 30mmHg knee high open or closed toe compression stockings. Jobst ultrasheer or equivalent.    Varicose veins of both lower extremities with complications       * order for DME     2 each    Please measure and distribute 1 pair of 30mmHg - 40mmHg knee high open toe ulcercare compression stockings. Jobst ultrasheer or equivalent.    Varicose veins of bilateral lower extremities with other complications       oxyCODONE 5 MG IR tablet    ROXICODONE     Take 1-2 tablets (5-10 mg) by mouth every 3 hours as needed for moderate to severe pain And scheduled Oxycodone 10 mg at 0800 and 1300 prior to therapies    Closed fracture of neck of right femur, initial encounter (H)       senna-docusate 8.6-50 MG per tablet    SENOKOT-S;PERICOLACE    100 tablet    Take 2 tablets by mouth 2 times daily as needed for constipation    Constipation, unspecified constipation type       sildenafil 50 MG tablet    VIAGRA "    10 tablet    Take 1 tablet (50 mg) by mouth daily as needed for erectile dysfunction    Vasculogenic erectile dysfunction, unspecified vasculogenic erectile dysfunction type       silver sulfADIAZINE 1 % cream    SILVADENE    85 g    Apply topically daily To affected areas on right foot and leg.    Ulcer of right lower leg, with fat layer exposed (H), Chronic venous hypertension with ulcer involving right side (H), Type 2 diabetes, controlled, with neuropathy (H)       simvastatin 10 MG tablet    ZOCOR    90 tablet    Take 1 tablet (10 mg) by mouth At Bedtime    Type 2 diabetes, controlled, with neuropathy (H)       sitagliptin 100 MG tablet    JANUVIA    90 tablet    Take 1 tablet (100 mg) by mouth daily    Type 2 diabetes, controlled, with neuropathy (H)       WARFARIN SODIUM PO      Take by mouth daily See facility discharge instructions for current INR dose. Also check TCU discharge summary        * Notice:  This list has 3 medication(s) that are the same as other medications prescribed for you. Read the directions carefully, and ask your doctor or other care provider to review them with you.

## 2017-09-19 NOTE — PATIENT INSTRUCTIONS
[facility]: [phone]     [facility] Medication Refill Request Information:  * Please contact your pharmacy regarding ANY request for medication refills.  ** [facility] Prescription Fax = [phone]  * Please allow 3 business days for routine medication refills.  * Please allow 5 business days for controlled substance medication refills.     [facility] Test Result notification information:  *You will be notified with in 7-10 days of your appointment day regarding the results of your test.  If you are on MyChart you will be notified as soon as the provider has reviewed the results and signed off on them.

## 2017-09-20 ENCOUNTER — ANTICOAGULATION THERAPY VISIT (OUTPATIENT)
Dept: ANTICOAGULATION | Facility: CLINIC | Age: 72
End: 2017-09-20

## 2017-09-20 DIAGNOSIS — S72.002A CLOSED FRACTURE OF NECK OF LEFT FEMUR, INITIAL ENCOUNTER (H): ICD-10-CM

## 2017-09-20 DIAGNOSIS — Z47.1 AFTERCARE FOLLOWING JOINT REPLACEMENT: ICD-10-CM

## 2017-09-20 DIAGNOSIS — Z79.01 LONG-TERM (CURRENT) USE OF ANTICOAGULANTS: ICD-10-CM

## 2017-09-20 NOTE — PROGRESS NOTES
ANTICOAGULATION FOLLOW-UP CLINIC VISIT    Patient Name:  Amos Walker  Date:  9/20/2017  Contact Type:  Telephone    SUBJECTIVE:     Patient Findings     Comments Pt D/C from TCU 9/15 and no referral was sent to have him monitor. Pt was D/C Warfarin 3mg take one tablet three times a day. Pt is done with Warfarin 9/24.           OBJECTIVE    INR   Date Value Ref Range Status   09/19/2017 1.58 (H) 0.86 - 1.14 Final       ASSESSMENT / PLAN  INR assessment SUB    Recheck INR In: 4 DAYS    INR Location Clinic      Anticoagulation Summary as of 9/20/2017     INR goal 2.0-2.5   Today's INR 1.58! (9/19/2017)   Maintenance plan No maintenance plan   Full instructions 9/20: 4 mg; 9/21: 4 mg; 9/22: 3 mg; 9/23: 3 mg; 9/24: 3 mg; Otherwise No maintenance plan   Next INR check    Target end date     Indications   Fracture of neck of femur (H) [S72.009A]  Aftercare following joint replacement [Z47.1] [Z47.1]  Long-term (current) use of anticoagulants [Z79.01] [Z79.01]         Anticoagulation Episode Summary     INR check location     Preferred lab     Resolved date 9/25/2017    Resolved reason Therapy  Complete    Send INR reminders to [facility]    Comments Four Week Duration 8/27/17-9/24/17      Anticoagulation Care Providers     Provider Role Specialty Phone number    Ish Jackman MD Responsible Orthopedics [phone]            See the Encounter Report to view Anticoagulation Flowsheet and Dosing Calendar (Go to Encounters tab in chart review, and find the Anticoagulation Therapy Visit)    Spoke with HÉCTOR Jackson. Gave them lab results and new warfarin recommendation.  No changes in health, medication, or diet. No missed doses, no falls. No signs or symptoms of bleed or clotting.     Also spoke with SAFIA Vasquez with Ortho Triage about orders/referral     Shantell Guzman RN

## 2017-09-20 NOTE — MR AVS SNAPSHOT
Amos Walker   9/20/2017   Anticoagulation Therapy Visit    Description:  72 year old male   Provider:  Shantell Guzman, RN   Department:  [facility]           INR as of 9/20/2017     Today's INR 1.58! (9/19/2017)      Anticoagulation Summary as of 9/20/2017     INR goal 2.0-2.5   Today's INR 1.58! (9/19/2017)   Full instructions 9/20: 4 mg; 9/21: 4 mg; 9/22: 3 mg; 9/23: 3 mg; 9/24: 3 mg; Otherwise No maintenance plan   Next INR check     Indications   Fracture of neck of femur (H) [S72.009A]  Aftercare following joint replacement [Z47.1] [Z47.1]  Long-term (current) use of anticoagulants [Z79.01] [Z79.01]         Anticoagulation Episode Summary     Resolved date 9/25/2017    Resolved reason Therapy  Complete

## 2017-09-21 ENCOUNTER — TELEPHONE (OUTPATIENT)
Dept: WOUND CARE | Facility: CLINIC | Age: 72
End: 2017-09-21

## 2017-09-21 NOTE — TELEPHONE ENCOUNTER
----- Message from Racheal Swift MD sent at 9/20/2017  3:19 PM CDT -----  Regarding: FW: Wound Care Appt  Hi Terri,  Sorry to bother you.  This patient has PAD, DM, recent hip replacement, several wounds, including a new pressure ulcer on his heel from hospitalization.  Any chance someone could take a look sooner than 8 days from now just to give some basic advice on dressings?  Thanks,  Racheal Swift MD  Internal Medicine    ----- Message -----     From: Rona Almonte     Sent: 9/19/2017   6:30 PM       To: Racheal Swift MD  Subject: Wound Care Appt                                  Hi Dr. Swift,    I just want to give you an update. I schedule this patient in [place] [facility] [place]. Patient request for a sooner appt but I inform pt that we should at least have one and that I will give you an update. He have an appt with Dr. Mcclain in  on 10/2, this is also the Provider first available. Thought?    -Rona

## 2017-09-26 ENCOUNTER — MYC MEDICAL ADVICE (OUTPATIENT)
Dept: ORTHOPEDICS | Facility: CLINIC | Age: 72
End: 2017-09-26

## 2017-09-28 ENCOUNTER — OFFICE VISIT (OUTPATIENT)
Dept: WOUND CARE | Facility: CLINIC | Age: 72
End: 2017-09-28

## 2017-09-28 DIAGNOSIS — E11.40 TYPE 2 DIABETES, CONTROLLED, WITH NEUROPATHY (H): ICD-10-CM

## 2017-09-28 DIAGNOSIS — L97.909 VENOUS STASIS ULCER (H): ICD-10-CM

## 2017-09-28 DIAGNOSIS — E11.621 DIABETIC FOOT ULCER ASSOCIATED WITH TYPE 2 DIABETES MELLITUS, WITH FAT LAYER EXPOSED, UNSPECIFIED LATERALITY, UNSPECIFIED PART OF FOOT (H): ICD-10-CM

## 2017-09-28 DIAGNOSIS — L97.502 DIABETIC FOOT ULCER ASSOCIATED WITH TYPE 2 DIABETES MELLITUS, WITH FAT LAYER EXPOSED, UNSPECIFIED LATERALITY, UNSPECIFIED PART OF FOOT (H): ICD-10-CM

## 2017-09-28 DIAGNOSIS — I83.009 VENOUS STASIS ULCER (H): ICD-10-CM

## 2017-09-28 DIAGNOSIS — E11.51 DIABETES MELLITUS WITH PERIPHERAL VASCULAR DISEASE (H): Primary | ICD-10-CM

## 2017-09-28 NOTE — NURSING NOTE
Chief Complaint   Patient presents with     WOUND CARE     wound care       There were no vitals filed for this visit.    There is no height or weight on file to calculate BMI.      Rj LANE
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
## 2017-09-28 NOTE — PATIENT INSTRUCTIONS
Wound Care Instructions:   Perform daily    Right shin wound:   1. Spray with microklenz, pat dry  2. Apply adaptic, followed by silvercel  3. Cover with 4x4's and kerlix  4. Secure with coban     Right lateral foot wound:  1. Spray with microklenz, pat dry  2. Apply silvadene cream  3. Apply optifoam adhesive bandage or mepilex border    Left heel wound:   1. Spray with microklenz, pat dry  2. Apply thin amount of medihoney  3. Cover with optifoam adhesive bandage    Apply compression stockings to right lower leg daily. Remove while you are assessing patient, reapply when you leave. I am going to send a message to Dr. Jenkins about compressing the left leg.     Follow up with me in 2 weeks.
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
## 2017-09-28 NOTE — MR AVS SNAPSHOT
After Visit Summary   9/28/2017    Amos Walker    MRN: [id number]           Patient Information     Date Of Birth          1945        Visit Information        Provider Department      9/28/2017 2:30 PM Franchesca Alegre PA-C [facility]        Care Instructions    Wound Care Instructions:   Perform daily    Right shin wound:   1. Spray with microklenz, pat dry  2. Apply adaptic, followed by silvercel  3. Cover with 4x4's and kerlix  4. Secure with coban     Right lateral foot wound:  1. Spray with microklenz, pat dry  2. Apply silvadene cream  3. Apply optifoam adhesive bandage or mepilex border    Left heel wound:   1. Spray with microklenz, pat dry  2. Apply thin amount of medihoney  3. Cover with optifoam adhesive bandage    Apply compression stockings to right lower leg daily. Remove while you are assessing patient, reapply when you leave. I am going to send a message to Dr. Jenkins about compressing the left leg.     Follow up with me in 2 weeks.               Follow-ups after your visit        Your next 10 appointments already scheduled     Oct 02, 2017 12:30 PM CDT   Return Visit with Brayan Mcclain DPM   [facility] ([facility])    [address] [place] [address] [id number] [phone]            Oct 04, 2017  5:45 PM CDT   (Arrive by 5:30 PM)   RETURN HIP with Ish Jackman MD   [facility] ([facility] [place])    [address] [place] [address] [id number] [phone]            Oct 19, 2017 11:25 AM CDT   (Arrive by 11:10 AM)   Return Visit with Racheal Swift MD   [facility] ([facility] [place])    [address] [place] [address] [id number] [phone]            Jun 20, 2018 10:30 AM CDT   RETURN RETINA with Jen Aranda MD   [facility] Clinic ([facility])    [address]ony [address]  [address]  [address] Clin [address]a  [facility] [address]   [phone]              Who to contact     Please call your clinic at [phone] to:    Ask questions about your health    Make or cancel appointments    Discuss your medicines    Learn about your test results    Speak to your doctor   If you have compliments or concerns about an experience at your clinic, or if you wish to file a complaint, please contact [facility] Patient Relations at [phone] or email us at [email or web address]         Additional Information About Your Visit        Be[employer] Information     [employer] gives you secure access to your electronic health record. If you see a primary care provider, you can also send messages to your care team and make appointments. If you have questions, please call your primary care clinic.  If you do not have a primary care provider, please call [phone] and they will assist you.      [employer] is an electronic gateway that provides easy, online access to your medical records. With [employer], you can request a clinic appointment, read your test results, renew a prescription or communicate with your care team.     To access your existing account, please contact your [facility] or call [phone] for assistance.        Care EveryWhere ID     This is your Care EveryWhere ID. This could be used by other organizations to access your [place] medical records  [id number]         Blood Pressure from Last 3 Encounters:   09/19/17 131/78   09/11/17 154/65   09/07/17 132/72    Weight from Last 3 Encounters:   09/11/17 171 lb 6.4 oz   09/07/17 171 lb 6.4 oz   09/05/17 171 lb 6.4 oz              Today, you had the following     No orders found for display       Primary Care Provider Office Phone # Fax #    Racheal Swift [phone]       [address] [place] [address]  [employer] [address]        Equal Access to Services     SAMSON MELTON AH:  Hadii aad ku hadjeannieo Somaameali, waaxda luqadaha, qaybta kaalchino jeffery, yolanda genidaija lopez. So [facility] [phone].    ATENCIÓN: Si pancho wagner, tiene a rodrigues disposición servicios gratuitos de asistencia lingüística. Llame al [phone].    We comply with applicable federal civil rights laws and [place] laws. We do not discriminate on the basis of race, color, national origin, age, disability sex, sexual orientation or gender identity.            Thank you!     Thank you for choosing [facility]  for your care. Our goal is always to provide you with excellent care. Hearing back from our patients is one way we can continue to improve our services. Please take a few minutes to complete the written survey that you may receive in the mail after your visit with us. Thank you!             Your Updated Medication List - Protect others around you: Learn how to safely use, store and throw away your medicines at www.disposemymeds.org.          This list is accurate as of: 9/28/17  3:13 PM.  Always use your most recent med list.                   Brand Name Dispense Instructions for use Diagnosis    acetaminophen 500 MG tablet    TYLENOL     Take 2 tablets (1,000 mg) by mouth 3 times daily    Closed fracture of neck of right femur, initial encounter (H)       ammonium lactate 12 % cream    LAC-HYDRIN    385 g    Apply topically 2 times daily as needed for dry skin    Venous stasis, Type 2 diabetes, controlled, with neuropathy (H)       ascorbic acid 500 MG Tabs     30 tablet    Take 1 tablet (500 mg) by mouth 2 times daily    Ulcer of right lower leg, with fat layer exposed (H)       blood glucose monitoring lancets     3 Box    Use to test blood sugars 2 as directed.    Type 2 diabetes, uncontrolled, with neuropathy (H)       blood glucose monitoring test strip    ONE TOUCH ULTRA    60 strip    Use to test blood sugars 2 times daily or as directed.    Type 2 diabetes mellitus (H)        "calcium carbonate 1250 MG tablet    OS-CLAUDIA 500 mg [place]. Ca    90 tablet    Take 1 tablet (1,250 mg) by mouth 2 times daily (with meals)    Closed fracture of neck of right femur, initial encounter (H)       Cholecalciferol 3000 UNITS Tabs     30 tablet    Take 3,000 Units by mouth daily    Closed fracture of neck of right femur, initial encounter (H)       clopidogrel 75 MG tablet    PLAVIX    30 tablet    Take 1 tablet (75 mg) by mouth daily    Peripheral vascular disease, unspecified (H)       ferrous sulfate 325 (65 FE) MG tablet    IRON    60 tablet    Take 1 tablet (325 mg) by mouth 2 times daily    Peripheral vascular disease, unspecified (H)       gentamicin 0.1 % cream    GARAMYCIN    30 g    Apply topically daily To right leg ulcer.    Ulcer of right lower leg, with fat layer exposed (H), Chronic venous hypertension with ulcer involving right side (H), Type 2 diabetes, controlled, with neuropathy (H)       hydrocortisone 0.2 % cream    WESTCORT    15 g    Apply sparingly to affected area twice times daily for 14 days.    Dermatitis       insulin glargine 100 UNIT/ML injection    LANTUS     Inject 28 Units Subcutaneous every morning        insulin pen needle 31G X 8 MM    B-D U/F    100 each    Use 1 daily o as directed    Diabetes mellitus, type II (H)       methocarbamol 750 MG tablet    ROBAXIN    45 tablet    Take 1 tablet (750 mg) by mouth 4 times daily    Closed fracture of neck of right femur, initial encounter (H)       nateglinide 120 MG tablet    STARLIX    90 tablet    TAKE 1 TABLET BY MOUTH THREE TIMES DAILY BEFORE MEALS    Type 2 diabetes, controlled, with neuropathy (H)       OPTIFOAM 6\"X6\" Pads     1 each    1 Box once a week    Ulcer of right leg, with fat layer exposed (H)       * order for DME     2 each    Please measure and distribute 1 pair of 20mm Hg - 30mm Hg knee high ULCER CARE open or closed toe compression stockings.  Patient has a size 13 foot and please take this into " consideration.  Jobst or equivalent    Varicose veins of lower extremities with other complications, Venous stasis ulcer of right lower extremity (H)       * order for DME     3 each    Please measure and distribute 1 pair of 20mmHg - 30mmHg knee high open or closed toe compression stockings. Jobst ultrasheer or equivalent.    Varicose veins of both lower extremities with complications       * order for DME     2 each    Please measure and distribute 1 pair of 30mmHg - 40mmHg knee high open toe ulcercare compression stockings. Jobst ultrasheer or equivalent.    Varicose veins of bilateral lower extremities with other complications       oxyCODONE 5 MG IR tablet    ROXICODONE     Take 1-2 tablets (5-10 mg) by mouth every 3 hours as needed for moderate to severe pain And scheduled Oxycodone 10 mg at 0800 and 1300 prior to therapies    Closed fracture of neck of right femur, initial encounter (H)       senna-docusate 8.6-50 MG per tablet    SENOKOT-S;PERICOLACE    100 tablet    Take 2 tablets by mouth 2 times daily as needed for constipation    Constipation, unspecified constipation type       sildenafil 50 MG tablet    VIAGRA    10 tablet    Take 1 tablet (50 mg) by mouth daily as needed for erectile dysfunction    Vasculogenic erectile dysfunction, unspecified vasculogenic erectile dysfunction type       silver sulfADIAZINE 1 % cream    SILVADENE    85 g    Apply topically daily To affected areas on right foot and leg.    Ulcer of right lower leg, with fat layer exposed (H), Chronic venous hypertension with ulcer involving right side (H), Type 2 diabetes, controlled, with neuropathy (H)       simvastatin 10 MG tablet    ZOCOR    90 tablet    Take 1 tablet (10 mg) by mouth At Bedtime    Type 2 diabetes, controlled, with neuropathy (H)       sitagliptin 100 MG tablet    JANUVIA    90 tablet    Take 1 tablet (100 mg) by mouth daily    Type 2 diabetes, controlled, with neuropathy (H)       WARFARIN SODIUM PO      Take by  mouth daily See facility discharge instructions for current INR dose. Also check TCU discharge summary        * Notice:  This list has 3 medication(s) that are the same as other medications prescribed for you. Read the directions carefully, and ask your doctor or other care provider to review them with you.

## 2017-09-29 DIAGNOSIS — Z47.1 AFTERCARE FOLLOWING JOINT REPLACEMENT: Primary | ICD-10-CM

## 2017-10-02 ENCOUNTER — OFFICE VISIT (OUTPATIENT)
Dept: PODIATRY | Facility: CLINIC | Age: 72
End: 2017-10-02
Payer: MEDICARE

## 2017-10-02 VITALS — DIASTOLIC BLOOD PRESSURE: 81 MMHG | OXYGEN SATURATION: 97 % | HEART RATE: 93 BPM | SYSTOLIC BLOOD PRESSURE: 148 MMHG

## 2017-10-02 DIAGNOSIS — E11.49 TYPE II OR UNSPECIFIED TYPE DIABETES MELLITUS WITH NEUROLOGICAL MANIFESTATIONS, NOT STATED AS UNCONTROLLED(250.60) (H): ICD-10-CM

## 2017-10-02 DIAGNOSIS — L97.422 ULCER OF LEFT HEEL AND MIDFOOT WITH FAT LAYER EXPOSED (H): ICD-10-CM

## 2017-10-02 DIAGNOSIS — L97.912 CHRONIC ULCER OF RIGHT LOWER EXTREMITY WITH FAT LAYER EXPOSED (H): Primary | ICD-10-CM

## 2017-10-02 DIAGNOSIS — I87.8 VENOUS STASIS: ICD-10-CM

## 2017-10-02 PROCEDURE — 97597 DBRDMT OPN WND 1ST 20 CM/<: CPT | Performed by: PODIATRIST

## 2017-10-02 RX ORDER — SILVER SULFADIAZINE 10 MG/G
CREAM TOPICAL DAILY
Qty: 20 G | Refills: 0 | COMMUNITY
Start: 2017-10-02 | End: 2018-01-22

## 2017-10-02 ASSESSMENT — PAIN SCALES - GENERAL: PAINLEVEL: MILD PAIN (3)

## 2017-10-02 NOTE — LETTER
October 2, 2017      Amos Walker  [address] [facility] [address]            To whom this may concern:      Amos leger  needs daily cleaning care as follows. Clean right anterior 5th metatarsal base and Left heel ulcers with Microklenz please pat dry and apply silvadene cream and cover with Optifoam.     To right leg ulcer; clean with Microklenz pat dry apply adaptic and Aquacel Ag, wrap with Kerlix and coban.    Please discontinue medi honey to right heel.  Any questions or concerns please feel free to contact our clinic at [phone]      Sincerely,      Brayan Mcclain

## 2017-10-02 NOTE — MR AVS SNAPSHOT
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 ([facility] [place])    [address] [place] [address] [id number] [phone]           Please bring a list of your current medicines to your exam. (Include vitamins, minerals and over-thecounter medicines.) Leave your valuables at home.  Tell your doctor if there is a chance you may be pregnant.  You do not need to do anything special for this exam.            Oct 04, 2017  5:45 PM CDT   (Arrive by 5:30 PM)   RETURN HIP with Ish Jackman MD   [facility] ([facility] [place])    [address] [place] [id number] [phone]            Oct 10, 2017  1:00 PM CDT   (Arrive by 12:45 PM)   Return Visit with Alvarez Simmons MD   [facility] ([facility] [place])    [address] [place] [address] [id number] [phone]            Oct 19, 2017 11:25 AM CDT   (Arrive by 11:10 AM)   Return Visit with Racheal Swift MD   [facility] ([facility] [place])    [address] [place] [address] [id number] [phone]            Jun 20, 2018 10:30  STEVE CDT   RETURN RETINA with Jen Aranda MD   Eye Clinic ([facility])    [address]  [address]  9[address] Clin 9a  [facility] [id number] [phone]              Who to contact     If you have questions or need follow up information about today's clinic visit or your schedule please contact [facility] directly at [phone].  Normal or non-critical lab and imaging results will be communicated to you by Sky[employer], letter or phone within 4 business days after the clinic has received the results. If you do not hear from us within 7 days, please contact the clinic through [employer] or phone. If you have a critical or abnormal lab result, we will notify you by phone as soon as possible.  Submit refill requests through [employer] or call your pharmacy and they will forward the refill request to us. Please allow 3 business days for your refill to be completed.          Additional Information About Your Visit        [employer] Information     [employer] gives you secure access to your electronic health record. If you see a primary care provider, you can also send messages to your care team and make appointments. If you have questions, please call your primary care clinic.  If you do not have a primary care provider, please call [phone] and they will assist you.      [employer] is an electronic gateway that provides easy, online access to your medical records. With [employer], you can request a clinic appointment, read your test results, renew a prescription or communicate with your care team.     To access your existing account, please contact your [facility] or call [phone] for assistance.        Care EveryWhere ID     This is your Care EveryWhere ID. This could be used by other organizations to access your [place] medical records  [id number]        Your Vitals Were     Pulse Pulse Oximetry                93 97%           Blood  Pressure from Last 3 Encounters:   10/02/17 148/81   09/19/17 131/78   09/11/17 154/65    Weight from Last 3 Encounters:   09/11/17 77.7 kg (171 lb 6.4 oz)   09/07/17 77.7 kg (171 lb 6.4 oz)   09/05/17 77.7 kg (171 lb 6.4 oz)              We Performed the Following     DEBRIDEMENT WOUND UP TO 20 SQ CM        Primary Care Provider Office Phone # Fax #    [place]ne Swift [phone]       [address] [place] [address] [address] [place] [address]        Equal Access to Services     [facility]: Hadii niurka Bedoya, waaxda andrae, qaybta kaalmada jose d, yolanda duran . So [facility] [phone].    ATENCIÓN: Si habla español, tiene a rodrigues disposición servicios gratuitos de asistencia lingüística. Llame al [phone].    We comply with applicable federal civil rights laws and [place] laws. We do not discriminate on the basis of race, color, national origin, age, disability, sex, sexual orientation, or gender identity.            Thank you!     Thank you for choosing [facility]  for your care. Our goal is always to provide you with excellent care. Hearing back from our patients is one way we can continue to improve our services. Please take a few minutes to complete the written survey that you may receive in the mail after your visit with us. Thank you!             Your Updated Medication List - Protect others around you: Learn how to safely use, store and throw away your medicines at www.disposemymeds.org.          This list is accurate as of: 10/2/17  3:15 PM.  Always use your most recent med list.                   Brand Name Dispense Instructions for use Diagnosis    acetaminophen 500 MG tablet    TYLENOL     Take 2 tablets (1,000 mg) by mouth 3 times daily    Closed fracture of neck of right femur, initial encounter (H)       ammonium lactate 12 % cream    LAC-HYDRIN    385 g    Apply topically 2 times daily as needed for dry skin    Venous stasis,  Type 2 diabetes, controlled, with neuropathy (H)       ascorbic acid 500 MG Tabs     30 tablet    Take 1 tablet (500 mg) by mouth 2 times daily    Ulcer of right lower leg, with fat layer exposed (H)       blood glucose monitoring lancets     3 Box    Use to test blood sugars 2 as directed.    Type 2 diabetes, uncontrolled, with neuropathy (H)       blood glucose monitoring test strip    ONE TOUCH ULTRA    60 strip    Use to test blood sugars 2 times daily or as directed.    Type 2 diabetes mellitus (H)       calcium carbonate 1250 MG tablet    OS-CLAUDIA 500 mg [place]. Ca    90 tablet    Take 1 tablet (1,250 mg) by mouth 2 times daily (with meals)    Closed fracture of neck of right femur, initial encounter (H)       Cholecalciferol 3000 UNITS Tabs     30 tablet    Take 3,000 Units by mouth daily    Closed fracture of neck of right femur, initial encounter (H)       clopidogrel 75 MG tablet    PLAVIX    30 tablet    Take 1 tablet (75 mg) by mouth daily    Peripheral vascular disease, unspecified       ferrous sulfate 325 (65 FE) MG tablet    IRON    60 tablet    Take 1 tablet (325 mg) by mouth 2 times daily    Peripheral vascular disease, unspecified       gentamicin 0.1 % cream    GARAMYCIN    30 g    Apply topically daily To right leg ulcer.    Ulcer of right lower leg, with fat layer exposed (H), Chronic venous hypertension with ulcer involving right side (H), Type 2 diabetes, controlled, with neuropathy (H)       hydrocortisone 0.2 % cream    WESTCORT    15 g    Apply sparingly to affected area twice times daily for 14 days.    Dermatitis       insulin glargine 100 UNIT/ML injection    LANTUS     Inject 28 Units Subcutaneous every morning        insulin pen needle 31G X 8 MM    B-D U/F    100 each    Use 1 daily o as directed    Diabetes mellitus, type II (H)       methocarbamol 750 MG tablet    ROBAXIN    45 tablet    Take 1 tablet (750 mg) by mouth 4 times daily    Closed fracture of neck of right femur, initial  "encounter (H)       nateglinide 120 MG tablet    STARLIX    90 tablet    TAKE 1 TABLET BY MOUTH THREE TIMES DAILY BEFORE MEALS    Type 2 diabetes, controlled, with neuropathy (H)       OPTIFOAM 6\"X6\" Pads     1 each    1 Box once a week    Ulcer of right leg, with fat layer exposed (H)       * order for DME     2 each    Please measure and distribute 1 pair of 20mm Hg - 30mm Hg knee high ULCER CARE open or closed toe compression stockings.  Patient has a size 13 foot and please take this into consideration.  Jobst or equivalent    Varicose veins of lower extremities with other complications, Venous stasis ulcer of right lower extremity (H)       * order for DME     3 each    Please measure and distribute 1 pair of 20mmHg - 30mmHg knee high open or closed toe compression stockings. Jobst ultrasheer or equivalent.    Varicose veins of both lower extremities with complications       * order for DME     2 each    Please measure and distribute 1 pair of 30mmHg - 40mmHg knee high open toe ulcercare compression stockings. Jobst ultrasheer or equivalent.    Varicose veins of bilateral lower extremities with other complications       oxyCODONE 5 MG IR tablet    ROXICODONE     Take 1-2 tablets (5-10 mg) by mouth every 3 hours as needed for moderate to severe pain And scheduled Oxycodone 10 mg at 0800 and 1300 prior to therapies    Closed fracture of neck of right femur, initial encounter (H)       senna-docusate 8.6-50 MG per tablet    SENOKOT-S;PERICOLACE    100 tablet    Take 2 tablets by mouth 2 times daily as needed for constipation    Constipation, unspecified constipation type       sildenafil 50 MG tablet    VIAGRA    10 tablet    Take 1 tablet (50 mg) by mouth daily as needed for erectile dysfunction    Vasculogenic erectile dysfunction, unspecified vasculogenic erectile dysfunction type       * silver sulfADIAZINE 1 % cream    SILVADENE    85 g    Apply topically daily To affected areas on right foot and leg.    " Ulcer of right lower leg, with fat layer exposed (H), Chronic venous hypertension with ulcer involving right side (H), Type 2 diabetes, controlled, with neuropathy (H)       * SILVADENE 1 % cream   Generic drug:  silver sulfADIAZINE     20 g    Apply topically daily Dispensed at visit for heel and 5th mt ulcers.        simvastatin 10 MG tablet    ZOCOR    90 tablet    Take 1 tablet (10 mg) by mouth At Bedtime    Type 2 diabetes, controlled, with neuropathy (H)       sitagliptin 100 MG tablet    JANUVIA    90 tablet    Take 1 tablet (100 mg) by mouth daily    Type 2 diabetes, controlled, with neuropathy (H)       WARFARIN SODIUM PO      Take by mouth daily See facility discharge instructions for current INR dose. Also check TCU discharge summary        * Notice:  This list has 5 medication(s) that are the same as other medications prescribed for you. Read the directions carefully, and ask your doctor or other care provider to review them with you.
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
## 2017-10-02 NOTE — NURSING NOTE
"Amos Walker's goals for this visit include: CC  He requests these members of his care team be copied on today's visit information: No    PCP: Racheal Swift    Referring Provider:  Referred Self, MD  No address on file    Chief Complaint   Patient presents with     RECHECK       Initial There were no vitals taken for this visit. Estimated body mass index is 22.01 kg/(m^2) as calculated from the following:    Height as of 8/25/17: 1.88 m (6' 2\").    Weight as of 9/11/17: 77.7 kg (171 lb 6.4 oz).  Medication Reconciliation: complete  "

## 2017-10-03 ENCOUNTER — MYC MEDICAL ADVICE (OUTPATIENT)
Dept: ORTHOPEDICS | Facility: CLINIC | Age: 72
End: 2017-10-03

## 2017-10-04 ENCOUNTER — OFFICE VISIT (OUTPATIENT)
Dept: ORTHOPEDICS | Facility: CLINIC | Age: 72
End: 2017-10-04

## 2017-10-04 DIAGNOSIS — S72.002A CLOSED FRACTURE OF NECK OF LEFT FEMUR, INITIAL ENCOUNTER (H): Primary | ICD-10-CM

## 2017-10-04 NOTE — MR AVS SNAPSHOT
After Visit Summary   10/4/2017    Amos Walker    MRN: [id number]           Patient Information     Date Of Birth          1945        Visit Information        Provider Department      10/4/2017 5:45 PM Ish Jackman MD [facility]        Today's Diagnoses     Closed fracture of neck of left femur, initial encounter (H)    -  1       Follow-ups after your visit        Your next 10 appointments already scheduled     Oct 09, 2017  9:00 AM CDT   (Arrive by 8:45 AM)   RETURN FOOT/ANKLE with Brayan Mcclain DPM   [facility] ([facility])    [address] [place] [address]   [phone]            Oct 10, 2017  1:00 PM CDT   (Arrive by 12:45 PM)   Return Visit with Alvarez Simmons MD   [facility] ([facility])    [address] [place] [address] [phone]            Oct 19, 2017 11:25 AM CDT   (Arrive by 11:10 AM)   Return Visit with Racheal Swift MD   [facility] ([facility])    [address] [place] [id number] [phone]            Jun 20, 2018 10:30 AM CDT   RETURN RETINA with Jen Aranda MD   [facility] ([facility])    [address]  [address]  [address]  [facility] [place] [facility] [address] [phone]              Who to contact     Please call your clinic at [phone] to:    Ask questions about your health    Make or cancel appointments    Discuss your medicines    Learn about your test results    Speak to your doctor   If you have compliments or concerns about an experience at your clinic, or if you wish to file a complaint, please contact [facility] Patient Relations at [phone] or email us at [email or web address]         Additional Information About Your Visit         [employer] Information     [employer] gives you secure access to your electronic health record. If you see a primary care provider, you can also send messages to your care team and make appointments. If you have questions, please call your primary care clinic.  If you do not have a primary care provider, please call [phone] and they will assist you.      [employer] is an electronic gateway that provides easy, online access to your medical records. With [employer], you can request a clinic appointment, read your test results, renew a prescription or communicate with your care team.     To access your existing account, please contact your [facility] or call [phone] for assistance.        Care EveryWhere ID     This is your Care EveryWhere ID. This could be used by other organizations to access your [place] medical records  [id number]         Blood Pressure from Last 3 Encounters:   10/02/17 148/81   09/19/17 131/78   09/11/17 154/65    Weight from Last 3 Encounters:   09/11/17 77.7 kg (171 lb 6.4 oz)   09/07/17 77.7 kg (171 lb 6.4 oz)   09/05/17 77.7 kg (171 lb 6.4 oz)              Today, you had the following     No orders found for display       Primary Care Provider Office Phone # Fax #    Racheal Swift [phone]       [address] [place] [address] [address]  [facility] [address]        Equal Access to Services     [facility] : Hadii niurka cortezo Soger, waaxda luqadaha, qaybta kaalmada jose d, yolanda duran . So [place] [phone].    ATENCIÓN: Si habla español, tiene a rodrigues disposición servicios gratuitos de asistencia lingüística. Llame al [phone].    We comply with applicable federal civil rights laws and [place] laws. We do not discriminate on the basis of race, color, national origin, age, disability, sex, sexual orientation, or gender identity.            Thank you!     Thank you for choosing [facility]   for your care. Our goal is always to provide you with excellent care. Hearing back from our patients is one way we can continue to improve our services. Please take a few minutes to complete the written survey that you may receive in the mail after your visit with us. Thank you!             Your Updated Medication List - Protect others around you: Learn how to safely use, store and throw away your medicines at www.disposemymeds.org.          This list is accurate as of: 10/4/17  6:02 PM.  Always use your most recent med list.                   Brand Name Dispense Instructions for use Diagnosis    acetaminophen 500 MG tablet    TYLENOL     Take 2 tablets (1,000 mg) by mouth 3 times daily    Closed fracture of neck of right femur, initial encounter (H)       ammonium lactate 12 % cream    LAC-HYDRIN    385 g    Apply topically 2 times daily as needed for dry skin    Venous stasis, Type 2 diabetes, controlled, with neuropathy (H)       ascorbic acid 500 MG Tabs     30 tablet    Take 1 tablet (500 mg) by mouth 2 times daily    Ulcer of right lower leg, with fat layer exposed (H)       blood glucose monitoring lancets     3 Box    Use to test blood sugars 2 as directed.    Type 2 diabetes, uncontrolled, with neuropathy (H)       blood glucose monitoring test strip    ONE TOUCH ULTRA    60 strip    Use to test blood sugars 2 times daily or as directed.    Type 2 diabetes mellitus (H)       calcium carbonate 1250 MG tablet    OS-CLAUDIA 500 mg [place]. [place]    90 tablet    Take 1 tablet (1,250 mg) by mouth 2 times daily (with meals)    Closed fracture of neck of right femur, initial encounter (H)       Cholecalciferol 3000 UNITS Tabs     30 tablet    Take 3,000 Units by mouth daily    Closed fracture of neck of right femur, initial encounter (H)       clopidogrel 75 MG tablet    PLAVIX    30 tablet    Take 1 tablet (75 mg) by mouth daily    Peripheral vascular disease, unspecified       ferrous sulfate 325 (65 FE) MG tablet    IRON  "   60 tablet    Take 1 tablet (325 mg) by mouth 2 times daily    Peripheral vascular disease, unspecified       gentamicin 0.1 % cream    GARAMYCIN    30 g    Apply topically daily To right leg ulcer.    Ulcer of right lower leg, with fat layer exposed (H), Chronic venous hypertension with ulcer involving right side (H), Type 2 diabetes, controlled, with neuropathy (H)       hydrocortisone 0.2 % cream    WESTCORT    15 g    Apply sparingly to affected area twice times daily for 14 days.    Dermatitis       insulin glargine 100 UNIT/ML injection    LANTUS     Inject 28 Units Subcutaneous every morning        insulin pen needle 31G X 8 MM    B-D U/F    100 each    Use 1 daily o as directed    Diabetes mellitus, type II (H)       methocarbamol 750 MG tablet    ROBAXIN    45 tablet    Take 1 tablet (750 mg) by mouth 4 times daily    Closed fracture of neck of right femur, initial encounter (H)       nateglinide 120 MG tablet    STARLIX    90 tablet    TAKE 1 TABLET BY MOUTH THREE TIMES DAILY BEFORE MEALS    Type 2 diabetes, controlled, with neuropathy (H)       OPTIFOAM 6\"X6\" Pads     1 each    1 Box once a week    Ulcer of right leg, with fat layer exposed (H)       * order for DME     2 each    Please measure and distribute 1 pair of 20mm Hg - 30mm Hg knee high ULCER CARE open or closed toe compression stockings.  Patient has a size 13 foot and please take this into consideration.  [place]st or equivalent    Varicose veins of lower extremities with other complications, Venous stasis ulcer of right lower extremity (H)       * order for DME     3 each    Please measure and distribute 1 pair of 20mmHg - 30mmHg knee high open or closed toe compression stockings. Jobst ultrasheer or equivalent.    Varicose veins of both lower extremities with complications       * order for DME     2 each    Please measure and distribute 1 pair of 30mmHg - 40mmHg knee high open toe ulcercare compression stockings. Jobst ultrasheer or " equivalent.    Varicose veins of bilateral lower extremities with other complications       oxyCODONE 5 MG IR tablet    ROXICODONE     Take 1-2 tablets (5-10 mg) by mouth every 3 hours as needed for moderate to severe pain And scheduled Oxycodone 10 mg at 0800 and 1300 prior to therapies    Closed fracture of neck of right femur, initial encounter (H)       senna-docusate 8.6-50 MG per tablet    SENOKOT-S;PERICOLACE    100 tablet    Take 2 tablets by mouth 2 times daily as needed for constipation    Constipation, unspecified constipation type       sildenafil 50 MG tablet    VIAGRA    10 tablet    Take 1 tablet (50 mg) by mouth daily as needed for erectile dysfunction    Vasculogenic erectile dysfunction, unspecified vasculogenic erectile dysfunction type       * silver sulfADIAZINE 1 % cream    SILVADENE    85 g    Apply topically daily To affected areas on right foot and leg.    Ulcer of right lower leg, with fat layer exposed (H), Chronic venous hypertension with ulcer involving right side (H), Type 2 diabetes, controlled, with neuropathy (H)       * SILVADENE 1 % cream   Generic drug:  silver sulfADIAZINE     20 g    Apply topically daily Dispensed at visit for heel and 5th mt ulcers.        simvastatin 10 MG tablet    ZOCOR    90 tablet    Take 1 tablet (10 mg) by mouth At Bedtime    Type 2 diabetes, controlled, with neuropathy (H)       sitagliptin 100 MG tablet    JANUVIA    90 tablet    Take 1 tablet (100 mg) by mouth daily    Type 2 diabetes, controlled, with neuropathy (H)       WARFARIN SODIUM PO      Take by mouth daily See facility discharge instructions for current INR dose. Also check TCU discharge summary        * Notice:  This list has 5 medication(s) that are the same as other medications prescribed for you. Read the directions carefully, and ask your doctor or other care provider to review them with you.

## 2017-10-04 NOTE — PROGRESS NOTES
Interval History:   Amos Walker is a 72 year old male who is here follow up for:   Left AGUEDA - 8/27/2017    Amos continues to make excellent progress with regards to his hip following the total hip replacement for femoral neck fracture. He has no ongoing left hip pain. He is walking with the use of a cane and is gradually weaning off of this. He has not had a positive incision. He is no longer taking oxycodone. He completed his Coumadin 2 weeks ago.         Physical Exam:     NAD  AOx3  Interactive and cooperative with the exam.    The incision is clean, dry, and well healed  Motor: GS/AT are intact  Sensation: intact diffusely throughout the foot.  No pain with passive range of motion of the operative extremity.    His heel ulcer was recently wrapped by the wound nurse and he preferred not to take down the wrapping. He notes that it is improving.       Imaging:      Repeat imaging demonstrates the cemented stem total hip replacement. The implants appear to be well fixed and well positioned.       Assessment and Plan:    Amos is making excellent progress on his left total hip replacement. He'll continue to progress with activities as tolerated. I reviewed with him the risky positions for hip dislocation. We discussed that there is a small risk of this happening down the road. He'll continue to see Dr. Chappell for management of his left heel ulcer, which is improving. I will see him back for a one-year postoperative visit or earlier if he has any problems in the meantime.    Ish Jackman M.D.     Arthritis and Joint Replacement  Department of Orthopaedic Surgery, [facility]  [email or web address]  [phone] (pager)

## 2017-10-04 NOTE — NURSING NOTE
"Reason For Visit:   Chief Complaint   Patient presents with     Surgical Followup     DOS 8/27/17 S/P Left Total Hip Replacement       Primary MD: Racheal Swift    Pain Assessment  Patient Currently in Pain: Yes  0-10 Pain Scale: 3  Primary Pain Location: Hip  Pain Orientation: Left  Pain Descriptors: Discomfort, Sore      Current Outpatient Prescriptions   Medication     silver sulfADIAZINE (SILVADENE) 1 % cream     insulin glargine (LANTUS) 100 UNIT/ML injection     senna-docusate (SENOKOT-S;PERICOLACE) 8.6-50 MG per tablet     acetaminophen (TYLENOL) 500 MG tablet     WARFARIN SODIUM PO     nateglinide (STARLIX) 120 MG tablet     sitagliptin (JANUVIA) 100 MG tablet     simvastatin (ZOCOR) 10 MG tablet     ammonium lactate (LAC-HYDRIN) 12 % cream     gentamicin (GARAMYCIN) 0.1 % cream     hydrocortisone (WESTCORT) 0.2 % cream     silver sulfADIAZINE (SILVADENE) 1 % cream     ferrous sulfate (IRON) 325 (65 FE) MG tablet     calcium carbonate (OS-CLAUDIA 500 MG [place]. [place]) 1250 MG tablet     clopidogrel (PLAVIX) 75 MG tablet     methocarbamol (ROBAXIN) 750 MG tablet     ascorbic acid 500 MG TABS     cholecalciferol 3000 UNITS TABS     insulin pen needle (B-D U/F) 31G X 8 MM     order for DME     blood glucose monitoring (ONE TOUCH ULTRA) test strip     sildenafil (VIAGRA) 50 MG tablet     blood glucose monitoring (FREESTYLE) lancets     order for DME     order for DME     Gauze Pads & Dressings (OPTIFOAM) 6\"X6\" PADS     oxyCODONE (ROXICODONE) 5 MG IR tablet     No current facility-administered medications for this visit.           Allergies   Allergen Reactions     Lisinopril Other (See Comments)     dizziness     Neomycin Other (See Comments)     Wound gets worse     Methylchloroisothiazolinone [Methylisothiazolinone] Rash     Povidone Iodine Rash     "

## 2017-10-04 NOTE — LETTER
10/4/2017       RE: Amos Walker  [address]  [place] [address]     Dear Colleague,    Thank you for referring your patient, Amos Walker, to the [facility] at [facility]. Please see a copy of my visit note below.           Interval History:   Amos Walker is a 72 year old male who is here follow up for:   Left AGUEDA - 8/27/2017    Amos continues to make excellent progress with regards to his hip following the total hip replacement for femoral neck fracture. He has no ongoing left hip pain. He is walking with the use of a cane and is gradually weaning off of this. He has not had a positive incision. He is no longer taking oxycodone. He completed his Coumadin 2 weeks ago.         Physical Exam:     NAD  AOx3  Interactive and cooperative with the exam.    The incision is clean, dry, and well healed  Motor: GS/AT are intact  Sensation: intact diffusely throughout the foot.  No pain with passive range of motion of the operative extremity.    His heel ulcer was recently wrapped by the wound nurse and he preferred not to take down the wrapping. He notes that it is improving.       Imaging:      Repeat imaging demonstrates the cemented stem total hip replacement. The implants appear to be well fixed and well positioned.       Assessment and Plan:    Amos is making excellent progress on his left total hip replacement. He'll continue to progress with activities as tolerated. I reviewed with him the risky positions for hip dislocation. We discussed that there is a small risk of this happening down the road. He'll continue to see Dr. Chappell for management of his left heel ulcer, which is improving. I will see him back for a one-year postoperative visit or earlier if he has any problems in the meantime.    Ish Jackman M.D.     Arthritis and Joint Replacement  Department of Orthopaedic Surgery, [facility]  [email or web address]  [phone] (pager)

## 2017-10-09 ENCOUNTER — OFFICE VISIT (OUTPATIENT)
Dept: ORTHOPEDICS | Facility: CLINIC | Age: 72
End: 2017-10-09

## 2017-10-09 DIAGNOSIS — L97.412 ULCER OF RIGHT HEEL AND MIDFOOT WITH FAT LAYER EXPOSED (H): ICD-10-CM

## 2017-10-09 DIAGNOSIS — L97.912 ULCER OF RIGHT LOWER EXTREMITY WITH FAT LAYER EXPOSED (H): Primary | ICD-10-CM

## 2017-10-09 DIAGNOSIS — E11.49 TYPE II OR UNSPECIFIED TYPE DIABETES MELLITUS WITH NEUROLOGICAL MANIFESTATIONS, NOT STATED AS UNCONTROLLED(250.60) (H): ICD-10-CM

## 2017-10-09 DIAGNOSIS — I87.8 VENOUS STASIS: ICD-10-CM

## 2017-10-09 NOTE — MR AVS SNAPSHOT
After Visit Summary   10/9/2017    Amos Walker    MRN: [id number]           Patient Information     Date Of Birth          1945        Visit Information        Provider Department      10/9/2017 9:00 AM Brayan Mcclain DPM [facility]        Today's Diagnoses     Ulcer of right lower extremity with fat layer exposed (H)    -  1    Ulcer of right heel and midfoot with fat layer exposed (H)        Venous stasis        Type II or unspecified type diabetes mellitus with neurological manifestations, not stated as uncontrolled(250.60) (H)           Follow-ups after your visit        Your next 10 appointments already scheduled     Oct 10, 2017  1:00 PM CDT   (Arrive by 12:45 PM)   Return Visit with Alvarez Simmons MD   [facility] ([facility] [[email or web address] and [facility] [place])    [address] [place] [id number] [phone]            Oct 16, 2017 11:00 AM CDT   Return Visit with Brayan Mcclain DPM   [facility] ([facility])    [address] [place] [id number] [phone]            Oct 19, 2017 11:25 AM CDT   (Arrive by 11:10 AM)   Return Visit with Racheal Swift MD   [facility] ([facility])    [address] [place] [id number] [phone]            Jun 20, 2018 10:30 AM CDT   RETURN RETINA with Jen Aranda MD   [facility] ([facility])    [facility] [address] [facility] [address] [place] [id number] [phone]              Who to contact     Please call your clinic at [phone] to:    Ask questions about your health    Make or cancel appointments    Discuss your medicines    Learn about your test results    Speak to your doctor   If you have compliments or concerns about an experience at your clinic, or if you wish to file a complaint, please contact  [facility] Patient Relations at [phone] or email us at [email or web address]         Additional Information About Your Visit        Hear It Firsthart Information     Hear It Firsthart gives you secure access to your electronic health record. If you see a primary care provider, you can also send messages to your care team and make appointments. If you have questions, please call your primary care clinic.  If you do not have a primary care provider, please call [phone] and they will assist you.      RIVS is an electronic gateway that provides easy, online access to your medical records. With RIVS, you can request a clinic appointment, read your test results, renew a prescription or communicate with your care team.     To access your existing account, please contact your [facility] or call [phone] for assistance.        Care EveryWhere ID     This is your Care EveryWhere ID. This could be used by other organizations to access your [facility] medical records  [id number]         Blood Pressure from Last 3 Encounters:   10/02/17 148/81   09/19/17 131/78   09/11/17 154/65    Weight from Last 3 Encounters:   09/11/17 77.7 kg (171 lb 6.4 oz)   09/07/17 77.7 kg (171 lb 6.4 oz)   09/05/17 77.7 kg (171 lb 6.4 oz)              Today, you had the following     No orders found for display       Primary Care Provider Office Phone # Fax #    Racheal Swift [phone]       [address] [place] [id number]        Equal Access to Services     SAMSON MELTON : Hadii niurka webb hadjeannieo Soger, waaxda luqadaha, qaybta kaalmada adeegyakylee, yolanda lopez. So [facility] [phone].    ATENCIÓN: Si habla español, tiene a rodrigues disposición servicios gratuitos de asistencia lingüística. Llame al [phone].    We comply with applicable federal civil rights laws and [place] laws. We do not discriminate on the basis of  race, color, national origin, age, disability, sex, sexual orientation, or gender identity.            Thank you!     Thank you for choosing [facility]  for your care. Our goal is always to provide you with excellent care. Hearing back from our patients is one way we can continue to improve our services. Please take a few minutes to complete the written survey that you may receive in the mail after your visit with us. Thank you!             Your Updated Medication List - Protect others around you: Learn how to safely use, store and throw away your medicines at www.disposemymeds.org.          This list is accurate as of: 10/9/17 11:56 AM.  Always use your most recent med list.                   Brand Name Dispense Instructions for use Diagnosis    acetaminophen 500 MG tablet    TYLENOL     Take 2 tablets (1,000 mg) by mouth 3 times daily    Closed fracture of neck of right femur, initial encounter (H)       ammonium lactate 12 % cream    LAC-HYDRIN    385 g    Apply topically 2 times daily as needed for dry skin    Venous stasis, Type 2 diabetes, controlled, with neuropathy (H)       ascorbic acid 500 MG Tabs     30 tablet    Take 1 tablet (500 mg) by mouth 2 times daily    Ulcer of right lower leg, with fat layer exposed (H)       blood glucose monitoring lancets     3 Box    Use to test blood sugars 2 as directed.    Type 2 diabetes, uncontrolled, with neuropathy (H)       blood glucose monitoring test strip    ONE TOUCH ULTRA    60 strip    Use to test blood sugars 2 times daily or as directed.    Type 2 diabetes mellitus (H)       calcium carbonate 1250 MG tablet    OS-CLAUDIA 500 mg [place]. Ca    90 tablet    Take 1 tablet (1,250 mg) by mouth 2 times daily (with meals)    Closed fracture of neck of right femur, initial encounter (H)       Cholecalciferol 3000 UNITS Tabs     30 tablet    Take 3,000 Units by mouth daily    Closed fracture of neck of right femur, initial encounter (H)       clopidogrel  "75 MG tablet    PLAVIX    30 tablet    Take 1 tablet (75 mg) by mouth daily    Peripheral vascular disease, unspecified       ferrous sulfate 325 (65 FE) MG tablet    IRON    60 tablet    Take 1 tablet (325 mg) by mouth 2 times daily    Peripheral vascular disease, unspecified       gentamicin 0.1 % cream    GARAMYCIN    30 g    Apply topically daily To right leg ulcer.    Ulcer of right lower leg, with fat layer exposed (H), Chronic venous hypertension with ulcer involving right side (H), Type 2 diabetes, controlled, with neuropathy (H)       hydrocortisone 0.2 % cream    WESTCORT    15 g    Apply sparingly to affected area twice times daily for 14 days.    Dermatitis       insulin glargine 100 UNIT/ML injection    LANTUS     Inject 28 Units Subcutaneous every morning        insulin pen needle 31G X 8 MM    B-D U/F    100 each    Use 1 daily o as directed    Diabetes mellitus, type II (H)       methocarbamol 750 MG tablet    ROBAXIN    45 tablet    Take 1 tablet (750 mg) by mouth 4 times daily    Closed fracture of neck of right femur, initial encounter (H)       nateglinide 120 MG tablet    STARLIX    90 tablet    TAKE 1 TABLET BY MOUTH THREE TIMES DAILY BEFORE MEALS    Type 2 diabetes, controlled, with neuropathy (H)       OPTIFOAM 6\"X6\" Pads     1 each    1 Box once a week    Ulcer of right leg, with fat layer exposed (H)       * order for DME     2 each    Please measure and distribute 1 pair of 20mm Hg - 30mm Hg knee high ULCER CARE open or closed toe compression stockings.  Patient has a size 13 foot and please take this into consideration.  Jobst or equivalent    Varicose veins of lower extremities with other complications, Venous stasis ulcer of right lower extremity (H)       * order for DME     3 each    Please measure and distribute 1 pair of 20mmHg - 30mmHg knee high open or closed toe compression stockings. Jobst ultrasheer or equivalent.    Varicose veins of both lower extremities with complications "       * order for DME     2 each    Please measure and distribute 1 pair of 30mmHg - 40mmHg knee high open toe ulcercare compression stockings. Jobst ultrasheer or equivalent.    Varicose veins of bilateral lower extremities with other complications       oxyCODONE 5 MG IR tablet    ROXICODONE     Take 1-2 tablets (5-10 mg) by mouth every 3 hours as needed for moderate to severe pain And scheduled Oxycodone 10 mg at 0800 and 1300 prior to therapies    Closed fracture of neck of right femur, initial encounter (H)       senna-docusate 8.6-50 MG per tablet    SENOKOT-S;PERICOLACE    100 tablet    Take 2 tablets by mouth 2 times daily as needed for constipation    Constipation, unspecified constipation type       sildenafil 50 MG tablet    VIAGRA    10 tablet    Take 1 tablet (50 mg) by mouth daily as needed for erectile dysfunction    Vasculogenic erectile dysfunction, unspecified vasculogenic erectile dysfunction type       * silver sulfADIAZINE 1 % cream    SILVADENE    85 g    Apply topically daily To affected areas on right foot and leg.    Ulcer of right lower leg, with fat layer exposed (H), Chronic venous hypertension with ulcer involving right side (H), Type 2 diabetes, controlled, with neuropathy (H)       * SILVADENE 1 % cream   Generic drug:  silver sulfADIAZINE     20 g    Apply topically daily Dispensed at visit for heel and 5th mt ulcers.        simvastatin 10 MG tablet    ZOCOR    90 tablet    Take 1 tablet (10 mg) by mouth At Bedtime    Type 2 diabetes, controlled, with neuropathy (H)       sitagliptin 100 MG tablet    JANUVIA    90 tablet    Take 1 tablet (100 mg) by mouth daily    Type 2 diabetes, controlled, with neuropathy (H)       WARFARIN SODIUM PO      Take by mouth daily See facility discharge instructions for current INR dose. Also check TCU discharge summary        * Notice:  This list has 5 medication(s) that are the same as other medications prescribed for you. Read the directions  carefully, and ask your doctor or other care provider to review them with you.
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
## 2017-10-09 NOTE — NURSING NOTE
Reason For Visit:   Chief Complaint   Patient presents with     Wound Check     Follow up. Diabetic.  Ulcer, Right leg and left heel. Swelling, left foot. Pt stated that this is improving. Pt stated that he just started wearing compression stockings. Pt stated that his dressings have been sliding off.        Pain Assessment  Patient Currently in Pain: Yes  Primary Pain Location: Foot  Pain Orientation: Right  Pain Descriptors:  (Raw)            Current Outpatient Prescriptions   Medication Sig Dispense Refill     silver sulfADIAZINE (SILVADENE) 1 % cream Apply topically daily Dispensed at visit for heel and 5th mt ulcers. 20 g 0     insulin glargine (LANTUS) 100 UNIT/ML injection Inject 28 Units Subcutaneous every morning       oxyCODONE (ROXICODONE) 5 MG IR tablet Take 1-2 tablets (5-10 mg) by mouth every 3 hours as needed for moderate to severe pain And scheduled Oxycodone 10 mg at 0800 and 1300 prior to therapies (Patient not taking: Reported on 10/4/2017)  0     senna-docusate (SENOKOT-S;PERICOLACE) 8.6-50 MG per tablet Take 2 tablets by mouth 2 times daily as needed for constipation 100 tablet      acetaminophen (TYLENOL) 500 MG tablet Take 2 tablets (1,000 mg) by mouth 3 times daily       WARFARIN SODIUM PO Take by mouth daily See facility discharge instructions for current INR dose. Also check TCU discharge summary       nateglinide (STARLIX) 120 MG tablet TAKE 1 TABLET BY MOUTH THREE TIMES DAILY BEFORE MEALS 90 tablet 11     sitagliptin (JANUVIA) 100 MG tablet Take 1 tablet (100 mg) by mouth daily 90 tablet 3     simvastatin (ZOCOR) 10 MG tablet Take 1 tablet (10 mg) by mouth At Bedtime 90 tablet 3     ammonium lactate (LAC-HYDRIN) 12 % cream Apply topically 2 times daily as needed for dry skin 385 g 3     gentamicin (GARAMYCIN) 0.1 % cream Apply topically daily To right leg ulcer. 30 g 5     hydrocortisone (WESTCORT) 0.2 % cream Apply sparingly to affected area twice times daily for 14 days. 15 g 3      "silver sulfADIAZINE (SILVADENE) 1 % cream Apply topically daily To affected areas on right foot and leg. 85 g 5     ferrous sulfate (IRON) 325 (65 FE) MG tablet Take 1 tablet (325 mg) by mouth 2 times daily 60 tablet 11     calcium carbonate (OS-CLAUDIA 500 MG [place]. CA) 1250 MG tablet Take 1 tablet (1,250 mg) by mouth 2 times daily (with meals) 90 tablet      clopidogrel (PLAVIX) 75 MG tablet Take 1 tablet (75 mg) by mouth daily 30 tablet 11     methocarbamol (ROBAXIN) 750 MG tablet Take 1 tablet (750 mg) by mouth 4 times daily 45 tablet      ascorbic acid 500 MG TABS Take 1 tablet (500 mg) by mouth 2 times daily 30 tablet      cholecalciferol 3000 UNITS TABS Take 3,000 Units by mouth daily 30 tablet      insulin pen needle (B-D U/F) 31G X 8 MM Use 1 daily o as directed 100 each 3     order for DME Please measure and distribute 1 pair of 30mmHg - 40mmHg knee high open toe ulcercare compression stockings. Jobst ultrasheer or equivalent. 2 each 6     blood glucose monitoring (ONE TOUCH ULTRA) test strip Use to test blood sugars 2 times daily or as directed. 60 strip 11     sildenafil (VIAGRA) 50 MG tablet Take 1 tablet (50 mg) by mouth daily as needed for erectile dysfunction 10 tablet 11     blood glucose monitoring (FREESTYLE) lancets Use to test blood sugars 2 as directed. 3 Box 3     order for DME Please measure and distribute 1 pair of 20mmHg - 30mmHg knee high open or closed toe compression stockings. Jobst ultrasheer or equivalent. 3 each 12     order for DME Please measure and distribute 1 pair of 20mm Hg - 30mm Hg knee high ULCER CARE open or closed toe compression stockings.   Patient has a size 13 foot and please take this into consideration.   Jobst or equivalent 2 each 1     Gauze Pads & Dressings (OPTIFOAM) 6\"X6\" PADS 1 Box once a week 1 each 6          Allergies   Allergen Reactions     Lisinopril Other (See Comments)     dizziness     Neomycin Other (See Comments)     Wound gets worse     " Methylchloroisothiazolinone [Methylisothiazolinone] Rash     Povidone Iodine Rash
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
## 2017-10-10 ENCOUNTER — OFFICE VISIT (OUTPATIENT)
Dept: INTERNAL MEDICINE | Facility: CLINIC | Age: 72
End: 2017-10-10

## 2017-10-10 VITALS
HEART RATE: 110 BPM | BODY MASS INDEX: 22.98 KG/M2 | WEIGHT: 179 LBS | DIASTOLIC BLOOD PRESSURE: 83 MMHG | OXYGEN SATURATION: 98 % | SYSTOLIC BLOOD PRESSURE: 149 MMHG | RESPIRATION RATE: 16 BRPM

## 2017-10-10 DIAGNOSIS — D64.9 ANEMIA, UNSPECIFIED TYPE: Primary | ICD-10-CM

## 2017-10-10 DIAGNOSIS — Z51.89 VISIT FOR WOUND CARE: ICD-10-CM

## 2017-10-10 DIAGNOSIS — Z87.898 HISTORY OF UNSTEADY GAIT: ICD-10-CM

## 2017-10-10 DIAGNOSIS — S72.002K CLOSED FRACTURE OF LEFT HIP WITH NONUNION, SUBSEQUENT ENCOUNTER: ICD-10-CM

## 2017-10-10 DIAGNOSIS — N18.9 CHRONIC KIDNEY DISEASE, UNSPECIFIED CKD STAGE: ICD-10-CM

## 2017-10-10 DIAGNOSIS — Z23 NEED FOR PROPHYLACTIC VACCINATION AND INOCULATION AGAINST INFLUENZA: ICD-10-CM

## 2017-10-10 ASSESSMENT — PAIN SCALES - GENERAL: PAINLEVEL: NO PAIN (0)

## 2017-10-10 NOTE — PATIENT INSTRUCTIONS
[facility] Medication Refill Request Information:  * Please contact your pharmacy regarding ANY request for medication refills.  ** [facility] Prescription Fax = [phone]  * Please allow 3 business days for routine medication refills.  * Please allow 5 business days for controlled substance medication refills.     [facility] Test Result notification information:  *You will be notified with in 7-10 days of your appointment day regarding the results of your test.  If you are on MyChart you will be notified as soon as the provider has reviewed the results and signed off on them.    [facility] [phone]

## 2017-10-10 NOTE — MR AVS SNAPSHOT
After Visit Summary   10/10/2017    Amos Walker    MRN: [id number]           Patient Information     Date Of Birth          1945        Visit Information        Provider Department      10/10/2017 1:00 PM Alvarez Simmons MD [facility]        Today's Diagnoses     Anemia, unspecified type    -  1    Chronic kidney disease, unspecified CKD stage        History of unsteady gait        Closed fracture of left hip with nonunion, subsequent encounter        Visit for wound care        Need for prophylactic vaccination and inoculation against influenza          Care Instructions    Primary Care Center Medication Refill Request Information:  * Please contact your pharmacy regarding ANY request for medication refills.  ** [facility] Prescription Fax = [phone]  * Please allow 3 business days for routine medication refills.  * Please allow 5 business days for controlled substance medication refills.     Primary Care Center Test Result notification information:  *You will be notified with in 7-10 days of your appointment day regarding the results of your test.  If you are on MyChart you will be notified as soon as the provider has reviewed the results and signed off on them.    Primary Care Center [phone]             Follow-ups after your visit        Additional Services     HOME CARE NURSING REFERRAL       Assessment and treat: home care            PHYSICAL THERAPY REFERRAL       *This therapy referral will be filtered to a centralized scheduling office at [facility] and the patient will receive a call to schedule an appointment at a [facility] location most convenient for them. *     [facility] provides Physical Therapy evaluation and treatment and many specialty services across the [facility] system.  If requesting a specialty program, please choose from the list below.    If you have not heard from the scheduling office within 2 business  "days, please call [phone] for all locations, with the exception of [place], please call [phone].  Treatment: Evaluation & Treatment  Special Instructions/Modalities: None  Special Programs: none    Please be aware that coverage of these services is subject to the terms and limitations of your health insurance plan.  Call member services at your health plan with any benefit or coverage questions.      **Note to Provider:  If you are referring outside of [facility] for the therapy appointment, please list the name of the location in the \"special instructions\" above, print the referral and give to the patient to schedule the appointment.                  Your next 10 appointments already scheduled     Oct 16, 2017 11:00 AM CDT   Return Visit with Brayan Mcclain DPM   [facility]    [address] [place] [id number] [phone]            Oct 19, 2017 11:25 AM CDT   (Arrive by 11:10 AM)   Return Visit with Racheal Swift MD   [facility] ([facility])    [address]  [address] [facility] [address] [phone]            Jun 20, 2018 10:30 AM CDT   RETURN RETINA with Jen Aranda MD   [facility] ([facility])    [place] [address] [id number] [phone]              Future tests that were ordered for you today     Open Future Orders        Priority Expected Expires Ordered    CBC with platelets Routine 10/10/2017 10/24/2017 10/10/2017    Basic metabolic panel Routine 10/10/2017 10/24/2017 10/10/2017            Who to contact     Please call your clinic at [phone] to:    Ask questions about your health    Make or cancel appointments    Discuss your medicines    Learn about your test results    Speak to your doctor   If you have compliments or concerns about an experience at your clinic, or if you wish " to file a complaint, please contact [facility] Patient Relations at [phone] or email us at [email or web address]         Additional Information About Your Visit        [employer] Information     [employer] gives you secure access to your electronic health record. If you see a primary care provider, you can also send messages to your care team and make appointments. If you have questions, please call your primary care clinic.  If you do not have a primary care provider, please call [phone] and they will assist you.      [employer] is an electronic gateway that provides easy, online access to your medical records. With [employer] you can request a clinic appointment, read your test results, renew a prescription or communicate with your care team.     To access your existing account, please contact your [facility] or call [phone] for assistance.        Care EveryWhere ID     This is your Care EveryWhere ID. This could be used by other organizations to access your [facility] medical records  [id number]        Your Vitals Were     Pulse Respirations Pulse Oximetry BMI (Body Mass Index)          110 16 98% 22.98 kg/m2         Blood Pressure from Last 3 Encounters:   10/10/17 149/83   10/02/17 148/81   09/19/17 131/78    Weight from Last 3 Encounters:   10/10/17 81.2 kg (179 lb)   09/11/17 77.7 kg (171 lb 6.4 oz)   09/07/17 77.7 kg (171 lb 6.4 oz)              We Performed the Following     FLU VACCINE, INCREASED ANTIGEN, PRESV FREE, AGE 65+ [61909]     HOME CARE NURSING REFERRAL     PHYSICAL THERAPY REFERRAL        Primary Care Provider Office Phone # Fax #    Racheal Swift [phone]       [address] [place] [phone]  [facility] [address]        Equal Access to Services     SAMSON MELTON : miguel Pineda, yolanda palma. So [facility]  [phone].    ATENCIÓN: Si pancho wagner, tiene a rodrigues disposición servicios gratuitos de asistencia lingüística. Mary sparrow [phone].    We comply with applicable federal civil rights laws and [place] laws. We do not discriminate on the basis of race, color, national origin, age, disability, sex, sexual orientation, or gender identity.            Thank you!     Thank you for choosing [facility]  for your care. Our goal is always to provide you with excellent care. Hearing back from our patients is one way we can continue to improve our services. Please take a few minutes to complete the written survey that you may receive in the mail after your visit with us. Thank you!             Your Updated Medication List - Protect others around you: Learn how to safely use, store and throw away your medicines at www.disposemymeds.org.          This list is accurate as of: 10/10/17  2:17 PM.  Always use your most recent med list.                   Brand Name Dispense Instructions for use Diagnosis    acetaminophen 500 MG tablet    TYLENOL     Take 2 tablets (1,000 mg) by mouth 3 times daily    Closed fracture of neck of right femur, initial encounter (H)       ammonium lactate 12 % cream    LAC-HYDRIN    385 g    Apply topically 2 times daily as needed for dry skin    Venous stasis, Type 2 diabetes, controlled, with neuropathy (H)       ascorbic acid 500 MG Tabs     30 tablet    Take 1 tablet (500 mg) by mouth 2 times daily    Ulcer of right lower leg, with fat layer exposed (H)       blood glucose monitoring lancets     3 Box    Use to test blood sugars 2 as directed.    Type 2 diabetes, uncontrolled, with neuropathy (H)       blood glucose monitoring test strip    ONE TOUCH ULTRA    60 strip    Use to test blood sugars 2 times daily or as directed.    Type 2 diabetes mellitus (H)       calcium carbonate 1250 MG tablet    OS-CLAUDIA 500 mg [place]. Ca    90 tablet    Take 1 tablet (1,250 mg) by mouth 2 times  "daily (with meals)    Closed fracture of neck of right femur, initial encounter (H)       Cholecalciferol 3000 UNITS Tabs     30 tablet    Take 3,000 Units by mouth daily    Closed fracture of neck of right femur, initial encounter (H)       clopidogrel 75 MG tablet    PLAVIX    30 tablet    Take 1 tablet (75 mg) by mouth daily    Peripheral vascular disease, unspecified       COLACE PO           ferrous sulfate 325 (65 FE) MG tablet    IRON    60 tablet    Take 1 tablet (325 mg) by mouth 2 times daily    Peripheral vascular disease, unspecified       gentamicin 0.1 % cream    GARAMYCIN    30 g    Apply topically daily To right leg ulcer.    Ulcer of right lower leg, with fat layer exposed (H), Chronic venous hypertension with ulcer involving right side (H), Type 2 diabetes, controlled, with neuropathy (H)       hydrocortisone 0.2 % cream    WESTCORT    15 g    Apply sparingly to affected area twice times daily for 14 days.    Dermatitis       insulin glargine 100 UNIT/ML injection    LANTUS     Inject 28 Units Subcutaneous every morning        insulin pen needle 31G X 8 MM    B-D U/F    100 each    Use 1 daily o as directed    Diabetes mellitus, type II (H)       methocarbamol 750 MG tablet    ROBAXIN    45 tablet    Take 1 tablet (750 mg) by mouth 4 times daily    Closed fracture of neck of right femur, initial encounter (H)       nateglinide 120 MG tablet    STARLIX    90 tablet    TAKE 1 TABLET BY MOUTH THREE TIMES DAILY BEFORE MEALS    Type 2 diabetes, controlled, with neuropathy (H)       OPTIFOAM 6\"X6\" Pads     1 each    1 Box once a week    Ulcer of right leg, with fat layer exposed (H)       * order for DME     2 each    Please measure and distribute 1 pair of 20mm Hg - 30mm Hg knee high ULCER CARE open or closed toe compression stockings.  Patient has a size 13 foot and please take this into consideration.  [place] or equivalent    Varicose veins of lower extremities with other complications, Venous stasis " ulcer of right lower extremity (H)       * order for DME     3 each    Please measure and distribute 1 pair of 20mmHg - 30mmHg knee high open or closed toe compression stockings. Jobst ultrasheer or equivalent.    Varicose veins of both lower extremities with complications       * order for DME     2 each    Please measure and distribute 1 pair of 30mmHg - 40mmHg knee high open toe ulcercare compression stockings. Jobst ultrasheer or equivalent.    Varicose veins of bilateral lower extremities with other complications       oxyCODONE 5 MG IR tablet    ROXICODONE     Take 1-2 tablets (5-10 mg) by mouth every 3 hours as needed for moderate to severe pain And scheduled Oxycodone 10 mg at 0800 and 1300 prior to therapies    Closed fracture of neck of right femur, initial encounter (H)       senna-docusate 8.6-50 MG per tablet    SENOKOT-S;PERICOLACE    100 tablet    Take 2 tablets by mouth 2 times daily as needed for constipation    Constipation, unspecified constipation type       sildenafil 50 MG tablet    VIAGRA    10 tablet    Take 1 tablet (50 mg) by mouth daily as needed for erectile dysfunction    Vasculogenic erectile dysfunction, unspecified vasculogenic erectile dysfunction type       * silver sulfADIAZINE 1 % cream    SILVADENE    85 g    Apply topically daily To affected areas on right foot and leg.    Ulcer of right lower leg, with fat layer exposed (H), Chronic venous hypertension with ulcer involving right side (H), Type 2 diabetes, controlled, with neuropathy (H)       * SILVADENE 1 % cream   Generic drug:  silver sulfADIAZINE     20 g    Apply topically daily Dispensed at visit for heel and 5th mt ulcers.        simvastatin 10 MG tablet    ZOCOR    90 tablet    Take 1 tablet (10 mg) by mouth At Bedtime    Type 2 diabetes, controlled, with neuropathy (H)       sitagliptin 100 MG tablet    JANUVIA    90 tablet    Take 1 tablet (100 mg) by mouth daily    Type 2 diabetes, controlled, with neuropathy (H)        WARFARIN SODIUM PO      Take by mouth daily See facility discharge instructions for current INR dose. Also check TCU discharge summary        * Notice:  This list has 5 medication(s) that are the same as other medications prescribed for you. Read the directions carefully, and ask your doctor or other care provider to review them with you.
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
## 2017-10-10 NOTE — NURSING NOTE
"Injectable Influenza Immunization Documentation    1.  Has the patient received the information for the injectable influenza vaccine? YES     2. Is the patient 6 months of age or older? YES     3. Does the patient have any of the following contraindications?         Severe allergy to eggs? No     Severe allergic reaction to previous influenza vaccines? No   Severe allergy to latex? No       History of Guillain-[place] syndrome? No     Currently have a temperature greater than 100.4F? No        4.  Severely egg allergic patients should have flu vaccine eligibility assessed by an MD, RN, or pharmacist, and those who received flu vaccine should be observed for 15 min by an MD, RN, Pharmacist, Medical Technician, or member of clinic staff.\": YES    5. Latex-allergic patients should be given latex-free influenza vaccine Yes. Please reference the Vaccine latex table to determine if your clinic s product is latex-containing.       Administered Influenza Fluzone High Dose (see Immunizations in Chart Review). Patient tolerated well.        Ivan Sánchez CMA at 2:27 PM on 10/10/2017         "

## 2017-10-10 NOTE — NURSING NOTE
Chief Complaint   Patient presents with     Recheck Medication     Here for medication recheck      Letter Request     Also here for letter to continue Physical Therapy and permission to drive after Hip replacement     Hypertension     concerns of hypertension     Ivan Sánchez CMA ([facility]) at 1:05 PM on 10/10/2017

## 2017-10-11 ENCOUNTER — TELEPHONE (OUTPATIENT)
Dept: INTERNAL MEDICINE | Facility: CLINIC | Age: 72
End: 2017-10-11

## 2017-10-11 DIAGNOSIS — D64.9 ANEMIA: ICD-10-CM

## 2017-10-11 DIAGNOSIS — N18.30 CKD (CHRONIC KIDNEY DISEASE) STAGE 3, GFR 30-59 ML/MIN (H): Primary | ICD-10-CM

## 2017-10-11 DIAGNOSIS — S72.002A CLOSED FRACTURE OF LEFT HIP (H): ICD-10-CM

## 2017-10-11 NOTE — TELEPHONE ENCOUNTER
----- Message from Cecil Fabian sent at 10/10/2017  4:31 PM CDT -----  Regarding: Clarification   Contact: [phone]  Debi with [facility]    Pt was in today to see the doctor, and a PT order was placed for Outpatient. She states if they are going for outpatient they can't have home care nursing. Want to talk with you about this.

## 2017-10-11 NOTE — TELEPHONE ENCOUNTER
Patient currently receiving PT, RN and HHA services through [facility].  Previous orders for Homecare and PT cancelled.  Lauren Rg LPN

## 2017-10-16 ENCOUNTER — OFFICE VISIT (OUTPATIENT)
Dept: PODIATRY | Facility: CLINIC | Age: 72
End: 2017-10-16
Payer: MEDICARE

## 2017-10-16 ENCOUNTER — TELEPHONE (OUTPATIENT)
Dept: PODIATRY | Facility: CLINIC | Age: 72
End: 2017-10-16

## 2017-10-16 VITALS — HEART RATE: 106 BPM | OXYGEN SATURATION: 100 % | SYSTOLIC BLOOD PRESSURE: 140 MMHG | DIASTOLIC BLOOD PRESSURE: 72 MMHG

## 2017-10-16 DIAGNOSIS — E11.49 TYPE II OR UNSPECIFIED TYPE DIABETES MELLITUS WITH NEUROLOGICAL MANIFESTATIONS, NOT STATED AS UNCONTROLLED(250.60) (H): ICD-10-CM

## 2017-10-16 DIAGNOSIS — L97.912 ULCER OF RIGHT LOWER EXTREMITY WITH FAT LAYER EXPOSED (H): Primary | ICD-10-CM

## 2017-10-16 DIAGNOSIS — N18.9 CHRONIC KIDNEY DISEASE, UNSPECIFIED CKD STAGE: ICD-10-CM

## 2017-10-16 DIAGNOSIS — L97.422 ULCER OF LEFT HEEL AND MIDFOOT WITH FAT LAYER EXPOSED (H): ICD-10-CM

## 2017-10-16 DIAGNOSIS — D64.9 ANEMIA, UNSPECIFIED TYPE: ICD-10-CM

## 2017-10-16 LAB
ANION GAP SERPL CALCULATED.3IONS-SCNC: 7 MMOL/L (ref 3–14)
BUN SERPL-MCNC: 32 MG/DL (ref 7–30)
CALCIUM SERPL-MCNC: 8.5 MG/DL (ref 8.5–10.1)
CHLORIDE SERPL-SCNC: 100 MMOL/L (ref 94–109)
CO2 SERPL-SCNC: 28 MMOL/L (ref 20–32)
CREAT SERPL-MCNC: 1.16 MG/DL (ref 0.66–1.25)
ERYTHROCYTE [DISTWIDTH] IN BLOOD BY AUTOMATED COUNT: 13.7 % (ref 10–15)
GFR SERPL CREATININE-BSD FRML MDRD: 62 ML/MIN/1.7M2
GLUCOSE SERPL-MCNC: 196 MG/DL (ref 70–99)
HCT VFR BLD AUTO: 34 % (ref 40–53)
HGB BLD-MCNC: 11.2 G/DL (ref 13.3–17.7)
MCH RBC QN AUTO: 31.2 PG (ref 26.5–33)
MCHC RBC AUTO-ENTMCNC: 32.9 G/DL (ref 31.5–36.5)
MCV RBC AUTO: 95 FL (ref 78–100)
PLATELET # BLD AUTO: 216 10E9/L (ref 150–450)
POTASSIUM SERPL-SCNC: 4.6 MMOL/L (ref 3.4–5.3)
RBC # BLD AUTO: 3.59 10E12/L (ref 4.4–5.9)
SODIUM SERPL-SCNC: 135 MMOL/L (ref 133–144)
WBC # BLD AUTO: 6.3 10E9/L (ref 4–11)

## 2017-10-16 PROCEDURE — 99213 OFFICE O/P EST LOW 20 MIN: CPT | Performed by: PODIATRIST

## 2017-10-16 PROCEDURE — 36415 COLL VENOUS BLD VENIPUNCTURE: CPT | Performed by: INTERNAL MEDICINE

## 2017-10-16 PROCEDURE — 80048 BASIC METABOLIC PNL TOTAL CA: CPT | Performed by: INTERNAL MEDICINE

## 2017-10-16 PROCEDURE — 85027 COMPLETE CBC AUTOMATED: CPT | Performed by: INTERNAL MEDICINE

## 2017-10-16 NOTE — PATIENT INSTRUCTIONS
Thanks for coming today.  Ortho/Sports Medicine Clinic  [address] [place] [address]    To schedule future appointments in Ortho Clinic, you may call [phone].    To schedule ordered imaging by your Provider: Call [facility] at [phone]    [employer] available online at:   [email or web address]    Please call if any further questions or concerns [phone] and ask for the Orthopedic Department. Clinic hours 8 am to 5 pm.    Return to clinic if symptoms worsen.

## 2017-10-16 NOTE — NURSING NOTE
"Amos Walker's goals for this visit include: Recheck bilateral wounds  He requests these members of his care team be copied on today's visit information: yes    PCP: Racheal Swift    Referring Provider:  Referred Self, MD  No address on file    Chief Complaint   Patient presents with     RECHECK     Recheck bilateral ulcers       Initial /72  Pulse 106  SpO2 100% Estimated body mass index is 22.98 kg/(m^2) as calculated from the following:    Height as of 8/25/17: 1.88 m (6' 2\").    Weight as of 10/10/17: 81.2 kg (179 lb).  Medication Reconciliation: complete    "

## 2017-10-16 NOTE — TELEPHONE ENCOUNTER
This patient was in today and is asking if his supplies from [facility] will be covered if he stops his home care nursing. Can you help with that by chance? Usually we would just order supplies and they'd check insurance coverage, but we don't want to stop home nursing until we know if it's covered, and he doesn't want to order if it wont be covered. Let me know if that makes sense or if you can help there.     Thank you,     SHANNEN Kirkpatrick
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
## 2017-10-16 NOTE — MR AVS SNAPSHOT
After Visit Summary   10/16/2017    Amos Walker    MRN: [id number]           Patient Information     Date Of Birth          1945        Visit Information        Provider Department      10/16/2017 11:00 AM Brayan Mcclain DPM [facility]        Today's Diagnoses     Ulcer of right lower extremity with fat layer exposed (H)    -  1    Ulcer of left heel and midfoot with fat layer exposed (H)        Type II or unspecified type diabetes mellitus with neurological manifestations, not stated as uncontrolled(250.60) (H)          Care Instructions    Thanks for coming today.  Ortho[facility] Medicine Clinic  [address] [address] [place] [id number]    To schedule future appointments in Ortho Clinic, you may call [phone].    To schedule ordered imaging by your Provider: Call [facility] at [phone]    Oculus360 available online at:   [employer]    Please call if any further questions or concerns [phone] and ask for the Orthopedic Department. Clinic hours 8 am to 5 pm.    Return to clinic if symptoms worsen.            Follow-ups after your visit        Follow-up notes from your care team     Return in about 2 weeks (around 10/30/2017).      Your next 10 appointments already scheduled     Oct 19, 2017 11:25 AM CDT   (Arrive by 11:10 AM)   Return Visit with Racheal Swift MD   [facility] ([facility])    [address]  [address] [facility] [address] [phone]            Oct 31, 2017 10:45 AM CDT   Return Visit with Brayan Mcclain DPM   [facility] ([facility])    [address] [place] [address] [phone]            Jun 20, 2018 10:30 AM CDT   RETURN RETINA with Jen Aranda MD   [facility] ([facility])    [address]  [address] [address] [place] [id number] [phone]               Who to contact     If you have questions or need follow up information about today's clinic visit or your schedule please contact [facility] directly at [phone].  Normal or non-critical lab and imaging results will be communicated to you by ezzai - how to arabiahart, letter or phone within 4 business days after the clinic has received the results. If you do not hear from us within 7 days, please contact the clinic through ezzai - how to arabiahart or phone. If you have a critical or abnormal lab result, we will notify you by phone as soon as possible.  Submit refill requests through [employer] or call your pharmacy and they will forward the refill request to us. Please allow 3 business days for your refill to be completed.          Additional Information About Your Visit        [employer] Information     [employer] gives you secure access to your electronic health record. If you see a primary care provider, you can also send messages to your care team and make appointments. If you have questions, please call your primary care clinic.  If you do not have a primary care provider, please call [phone] and they will assist you.      [employer] is an electronic gateway that provides easy, online access to your medical records. With [employer], you can request a clinic appointment, read your test results, renew a prescription or communicate with your care team.     To access your existing account, please contact your [facility] or call [phone] for assistance.        Care EveryWhere ID     This is your Care EveryWhere ID. This could be used by other organizations to access your [place] medical records  [id number]        Your Vitals Were     Pulse Pulse Oximetry                106 100%           Blood Pressure from Last 3 Encounters:   10/16/17 140/72   10/10/17 149/83   10/02/17 148/81    Weight from Last 3 Encounters:   10/10/17 81.2 kg (179 lb)   09/11/17 77.7 kg (171 lb 6.4 oz)   09/07/17 77.7  kg (171 lb 6.4 oz)              Today, you had the following     No orders found for display       Primary Care Provider Office Phone # Fax #    Racheal Swift [phone]       [address] [place] [address]  [facility] [address]        Equal Access to Services     SAMSON MELTON : Hadii aad ku hadasho Soomaali, waaxda luqadaha, qaybta kaalmada adeegyada, waxay geniin hayhollie lunaantoniromulo lopez. So [facility] [phone].    ATENCIÓN: Si habla español, tiene a rodrigues disposición servicios gratuitos de asistencia lingüística. [facility] [phone].    We comply with applicable federal civil rights laws and [place] laws. We do not discriminate on the basis of race, color, national origin, age, disability, sex, sexual orientation, or gender identity.            Thank you!     Thank you for choosing [facility]  for your care. Our goal is always to provide you with excellent care. Hearing back from our patients is one way we can continue to improve our services. Please take a few minutes to complete the written survey that you may receive in the mail after your visit with us. Thank you!             Your Updated Medication List - Protect others around you: Learn how to safely use, store and throw away your medicines at www.disposemymeds.org.          This list is accurate as of: 10/16/17  3:16 PM.  Always use your most recent med list.                   Brand Name Dispense Instructions for use Diagnosis    acetaminophen 500 MG tablet    TYLENOL     Take 2 tablets (1,000 mg) by mouth 3 times daily    Closed fracture of neck of right femur, initial encounter (H)       ammonium lactate 12 % cream    LAC-HYDRIN    385 g    Apply topically 2 times daily as needed for dry skin    Venous stasis, Type 2 diabetes, controlled, with neuropathy (H)       ascorbic acid 500 MG Tabs     30 tablet    Take 1 tablet (500 mg) by mouth 2 times daily    Ulcer of right lower leg, with fat layer exposed (H)        blood glucose monitoring lancets     3 Box    Use to test blood sugars 2 as directed.    Type 2 diabetes, uncontrolled, with neuropathy (H)       blood glucose monitoring test strip    ONE TOUCH ULTRA    60 strip    Use to test blood sugars 2 times daily or as directed.    Type 2 diabetes mellitus (H)       calcium carbonate 1250 MG tablet    OS-CLAUDIA 500 mg [place] Ca    90 tablet    Take 1 tablet (1,250 mg) by mouth 2 times daily (with meals)    Closed fracture of neck of right femur, initial encounter (H)       Cholecalciferol 3000 UNITS Tabs     30 tablet    Take 3,000 Units by mouth daily    Closed fracture of neck of right femur, initial encounter (H)       clopidogrel 75 MG tablet    PLAVIX    30 tablet    Take 1 tablet (75 mg) by mouth daily    Peripheral vascular disease, unspecified       COLACE PO           ferrous sulfate 325 (65 FE) MG tablet    IRON    60 tablet    Take 1 tablet (325 mg) by mouth 2 times daily    Peripheral vascular disease, unspecified       gentamicin 0.1 % cream    GARAMYCIN    30 g    Apply topically daily To right leg ulcer.    Ulcer of right lower leg, with fat layer exposed (H), Chronic venous hypertension with ulcer involving right side (H), Type 2 diabetes, controlled, with neuropathy (H)       HYDROCHLOROTHIAZIDE PO      Take 12.5 mg by mouth every other day        hydrocortisone 0.2 % cream    WESTCORT    15 g    Apply sparingly to affected area twice times daily for 14 days.    Dermatitis       insulin glargine 100 UNIT/ML injection    LANTUS     Inject 28 Units Subcutaneous every morning        insulin pen needle 31G X 8 MM    B-D U/F    100 each    Use 1 daily o as directed    Diabetes mellitus, type II (H)       LISINOPRIL PO      Take 20 mg by mouth 2 times daily        methocarbamol 750 MG tablet    ROBAXIN    45 tablet    Take 1 tablet (750 mg) by mouth 4 times daily    Closed fracture of neck of right femur, initial encounter (H)       nateglinide 120 MG tablet     "STARLIX    90 tablet    TAKE 1 TABLET BY MOUTH THREE TIMES DAILY BEFORE MEALS    Type 2 diabetes, controlled, with neuropathy (H)       OPTIFOAM 6\"X6\" Pads     1 each    1 Box once a week    Ulcer of right leg, with fat layer exposed (H)       * order for DME     2 each    Please measure and distribute 1 pair of 20mm Hg - 30mm Hg knee high ULCER CARE open or closed toe compression stockings.  Patient has a size 13 foot and please take this into consideration.  Jobst or equivalent    Varicose veins of lower extremities with other complications, Venous stasis ulcer of right lower extremity (H)       * order for DME     3 each    Please measure and distribute 1 pair of 20mmHg - 30mmHg knee high open or closed toe compression stockings. Jobst ultrasheer or equivalent.    Varicose veins of both lower extremities with complications       * order for DME     2 each    Please measure and distribute 1 pair of 30mmHg - 40mmHg knee high open toe ulcercare compression stockings. Jobst ultrasheer or equivalent.    Varicose veins of bilateral lower extremities with other complications       oxyCODONE 5 MG IR tablet    ROXICODONE     Take 1-2 tablets (5-10 mg) by mouth every 3 hours as needed for moderate to severe pain And scheduled Oxycodone 10 mg at 0800 and 1300 prior to therapies    Closed fracture of neck of right femur, initial encounter (H)       senna-docusate 8.6-50 MG per tablet    SENOKOT-S;PERICOLACE    100 tablet    Take 2 tablets by mouth 2 times daily as needed for constipation    Constipation, unspecified constipation type       sildenafil 50 MG tablet    VIAGRA    10 tablet    Take 1 tablet (50 mg) by mouth daily as needed for erectile dysfunction    Vasculogenic erectile dysfunction, unspecified vasculogenic erectile dysfunction type       * silver sulfADIAZINE 1 % cream    SILVADENE    85 g    Apply topically daily To affected areas on right foot and leg.    Ulcer of right lower leg, with fat layer exposed (H), " Chronic venous hypertension with ulcer involving right side (H), Type 2 diabetes, controlled, with neuropathy (H)       * SILVADENE 1 % cream   Generic drug:  silver sulfADIAZINE     20 g    Apply topically daily Dispensed at visit for heel and 5th mt ulcers.        simvastatin 10 MG tablet    ZOCOR    90 tablet    Take 1 tablet (10 mg) by mouth At Bedtime    Type 2 diabetes, controlled, with neuropathy (H)       sitagliptin 100 MG tablet    JANUVIA    90 tablet    Take 1 tablet (100 mg) by mouth daily    Type 2 diabetes, controlled, with neuropathy (H)       WARFARIN SODIUM PO      Take by mouth daily See facility discharge instructions for current INR dose. Also check TCU discharge summary        * Notice:  This list has 5 medication(s) that are the same as other medications prescribed for you. Read the directions carefully, and ask your doctor or other care provider to review them with you.

## 2017-10-19 ENCOUNTER — OFFICE VISIT (OUTPATIENT)
Dept: INTERNAL MEDICINE | Facility: CLINIC | Age: 72
End: 2017-10-19

## 2017-10-19 ENCOUNTER — TELEPHONE (OUTPATIENT)
Dept: GASTROENTEROLOGY | Facility: CLINIC | Age: 72
End: 2017-10-19

## 2017-10-19 VITALS
OXYGEN SATURATION: 100 % | HEIGHT: 76 IN | TEMPERATURE: 97.7 F | SYSTOLIC BLOOD PRESSURE: 107 MMHG | RESPIRATION RATE: 20 BRPM | WEIGHT: 184 LBS | BODY MASS INDEX: 22.41 KG/M2 | HEART RATE: 99 BPM | DIASTOLIC BLOOD PRESSURE: 67 MMHG

## 2017-10-19 DIAGNOSIS — Z79.4 TYPE 2 DIABETES MELLITUS WITH DIABETIC PERIPHERAL ANGIOPATHY WITHOUT GANGRENE, WITH LONG-TERM CURRENT USE OF INSULIN (H): ICD-10-CM

## 2017-10-19 DIAGNOSIS — E11.51 TYPE 2 DIABETES MELLITUS WITH DIABETIC PERIPHERAL ANGIOPATHY WITHOUT GANGRENE, WITH LONG-TERM CURRENT USE OF INSULIN (H): ICD-10-CM

## 2017-10-19 DIAGNOSIS — Z12.11 ENCOUNTER FOR SCREENING COLONOSCOPY: Primary | ICD-10-CM

## 2017-10-19 DIAGNOSIS — S72.001A CLOSED FRACTURE OF NECK OF RIGHT FEMUR, INITIAL ENCOUNTER (H): ICD-10-CM

## 2017-10-19 RX ORDER — METHOCARBAMOL 750 MG/1
750 TABLET, FILM COATED ORAL 4 TIMES DAILY
Qty: 45 TABLET | Status: CANCELLED | OUTPATIENT
Start: 2017-10-19

## 2017-10-19 ASSESSMENT — PAIN SCALES - GENERAL: PAINLEVEL: NO PAIN (0)

## 2017-10-19 NOTE — NURSING NOTE
Chief Complaint   Patient presents with     Hospital F/U     pt is here for a hospital follow up       Kayla Knott CMA at 11:03 AM on 10/19/2017

## 2017-10-19 NOTE — PATIENT INSTRUCTIONS
Primary Care Center Phone Number [phone]  Primary Care Center Medication Refill Request Information:  * Please contact your pharmacy regarding ANY request for medication refills.  ** [facility] Prescription Fax = [phone]  * Please allow 3 business days for routine medication refills.  * Please allow 5 business days for controlled substance medication refills.     Primary Care Center Test Result notification information:  *You will be notified with in 7-10 days of your appointment day regarding the results of your test.  If you are on MyChart you will be notified as soon as the provider has reviewed the results and signed off on them.        Keep checking blood glucoses before breakfast and dinner.  If less than 80 call Dr. FITZGERALD.    Also check Blood pressure.  If less than 110/60 regularly, please stop Hydrochlorothiazide.  If still low, call doctor.    Day prior to colonoscopy, please reduce insulin to 12 units.  Also stop plavix 5 days prior to colonoscopy.  Okay to continue baby aspirin.

## 2017-10-19 NOTE — MR AVS SNAPSHOT
After Visit Summary   10/19/2017    Amos Walker    MRN: [id number]           Patient Information     Date Of Birth          1945        Visit Information        Provider Department      10/19/2017 11:25 AM Racheal Swift MD [facility]        Today's Diagnoses     Encounter for screening colonoscopy    -  1    Closed fracture of neck of right femur, initial encounter (H)        Type 2 diabetes mellitus with diabetic peripheral angiopathy without gangrene, with long-term current use of insulin (H)          Care Instructions    Primary Care Center Phone Number [phone]  Primary Care Center Medication Refill Request Information:  * Please contact your pharmacy regarding ANY request for medication refills.  ** [facility] Prescription Fax = [phone]  * Please allow 3 business days for routine medication refills.  * Please allow 5 business days for controlled substance medication refills.     Primary Care Center Test Result notification information:  *You will be notified with in 7-10 days of your appointment day regarding the results of your test.  If you are on MyChart you will be notified as soon as the provider has reviewed the results and signed off on them.        Keep checking blood glucoses before breakfast and dinner.  If less than 80 call Dr. FITZGERALD.    Also check Blood pressure.  If less than 110/60 regularly, please stop Hydrochlorothiazide.  If still low, call doctor.    Day prior to colonoscopy, please reduce insulin to 12 units.  Also stop plavix 5 days prior to colonoscopy.  Okay to continue baby aspirin.          Follow-ups after your visit        Additional Services     GI Procedure Referral       Last Lab Result: Creatinine (mg/dL)       Date                     Value                 10/16/2017               1.16             ----------  Body mass index is 22.55 kg/(m^2).     Needed:  No  Language:  English    Patient will be contacted to schedule  procedure.     Please be aware that coverage of these services is subject to the terms and limitations of your health insurance plan.  Call member services at your health plan with any benefit or coverage questions.  Any procedures must be performed at a [facility] facility OR coordinated by your clinic's referral office.    Please bring the following with you to your appointment:    (1) Any X-Rays, CTs or MRIs which have been performed.  Contact the facility where they were done to arrange for  prior to your scheduled appointment.    (2) List of current medications   (3) This referral request   (4) Any documents/labs given to you for this referral                  Follow-up notes from your care team     Return in about 2 months (around 12/19/2017).      Your next 10 appointments already scheduled     Oct 31, 2017 10:45 AM CDT   Return Visit with Brayan Mcclain DPM   [facility]    [address] [place] [address]   [phone]            Dec 29, 2017 11:25 AM CST   (Arrive by 11:10 AM)   Return Visit with Racheal Swift MD   [facility] ([facility])    [address] [facility]  [address] [facility] [id number] [phone]            Jun 20, 2018 10:30 AM CDT   RETURN RETINA with Jen Aranda MD   Eye Clinic ([facility])    [address]ony [address]y [address]  [facility] [address] [phone]              Future tests that were ordered for you today     Open Future Orders        Priority Expected Expires Ordered    Hemoglobin A1c **(2 WKS) Routine 11/2/2017 10/19/2018 10/19/2017            Who to contact     Please call your clinic at [phone] to:    Ask questions about your health    Make or cancel appointments    Discuss your medicines    Learn about your test results    Speak to your doctor   If you have compliments or concerns about an  "experience at your clinic, or if you wish to file a complaint, please contact [facility] Patient Relations at [phone] or email us at [email or web address]         Additional Information About Your Visit        [place] [facility] Information     [employer] gives you secure access to your electronic health record. If you see a primary care provider, you can also send messages to your care team and make appointments. If you have questions, please call your primary care clinic.  If you do not have a primary care provider, please call [phone] and they will assist you.      [employer] is an electronic gateway that provides easy, online access to your medical records. With [employer], you can request a clinic appointment, read your test results, renew a prescription or communicate with your care team.     To access your existing account, please contact your [facility] or call [phone] for assistance.        Care EveryWhere ID     This is your Care EveryWhere ID. This could be used by other organizations to access your [place] medical records  [id number]        Your Vitals Were     Pulse Temperature Respirations Height Pulse Oximetry BMI (Body Mass Index)    99 97.7  F (36.5  C) (Oral) 20 1.924 m (6' 3.75\") 100% 22.55 kg/m2       Blood Pressure from Last 3 Encounters:   10/19/17 107/67   10/16/17 140/72   10/10/17 149/83    Weight from Last 3 Encounters:   10/19/17 83.5 kg (184 lb)   10/10/17 81.2 kg (179 lb)   09/11/17 77.7 kg (171 lb 6.4 oz)              We Performed the Following     GI Procedure Referral        Primary Care Provider Office Phone # Fax #    Racheal Swift [phone]       [address] [place] [address]  [facility] [address]        Equal Access to Services     SAMSON FRANCO: Nataliia Bedoya, miguel abebe, yolanda palma. So [facility] [phone].    ATENCIÓN: " Si habla bernard, tiene a rodrigues disposición servicios gratuitos de asistencia lingüística. Mary sparrow [phone].    We comply with applicable federal civil rights laws and [place] laws. We do not discriminate on the basis of race, color, national origin, age, disability, sex, sexual orientation, or gender identity.            Thank you!     Thank you for choosing [facility]  for your care. Our goal is always to provide you with excellent care. Hearing back from our patients is one way we can continue to improve our services. Please take a few minutes to complete the written survey that you may receive in the mail after your visit with us. Thank you!             Your Updated Medication List - Protect others around you: Learn how to safely use, store and throw away your medicines at www.disposemymeds.org.          This list is accurate as of: 10/19/17 12:05 PM.  Always use your most recent med list.                   Brand Name Dispense Instructions for use Diagnosis    acetaminophen 500 MG tablet    TYLENOL     Take 2 tablets (1,000 mg) by mouth 3 times daily    Closed fracture of neck of right femur, initial encounter (H)       ammonium lactate 12 % cream    LAC-HYDRIN    385 g    Apply topically 2 times daily as needed for dry skin    Venous stasis, Type 2 diabetes, controlled, with neuropathy (H)       ascorbic acid 500 MG Tabs     30 tablet    Take 1 tablet (500 mg) by mouth 2 times daily    Ulcer of right lower leg, with fat layer exposed (H)       blood glucose monitoring lancets     3 Box    Use to test blood sugars 2 as directed.    Type 2 diabetes, uncontrolled, with neuropathy (H)       blood glucose monitoring test strip    ONE TOUCH ULTRA    60 strip    Use to test blood sugars 2 times daily or as directed.    Type 2 diabetes mellitus (H)       calcium carbonate 1250 MG tablet    OS-CLAUDIA 500 mg Lummi. Ca    90 tablet    Take 1 tablet (1,250 mg) by mouth 2 times daily (with meals)    Closed  "fracture of neck of right femur, initial encounter (H)       Cholecalciferol 3000 UNITS Tabs     30 tablet    Take 3,000 Units by mouth daily    Closed fracture of neck of right femur, initial encounter (H)       clopidogrel 75 MG tablet    PLAVIX    30 tablet    Take 1 tablet (75 mg) by mouth daily    Peripheral vascular disease, unspecified       COLACE PO           ferrous sulfate 325 (65 FE) MG tablet    IRON    60 tablet    Take 1 tablet (325 mg) by mouth 2 times daily    Peripheral vascular disease, unspecified       gentamicin 0.1 % cream    GARAMYCIN    30 g    Apply topically daily To right leg ulcer.    Ulcer of right lower leg, with fat layer exposed (H), Chronic venous hypertension with ulcer involving right side (H), Type 2 diabetes, controlled, with neuropathy (H)       HYDROCHLOROTHIAZIDE PO      Take 12.5 mg by mouth every other day        hydrocortisone 0.2 % cream    WESTCORT    15 g    Apply sparingly to affected area twice times daily for 14 days.    Dermatitis       insulin glargine 100 UNIT/ML injection    LANTUS     Inject 28 Units Subcutaneous every morning        insulin pen needle 31G X 8 MM    B-D U/F    100 each    Use 1 daily o as directed    Diabetes mellitus, type II (H)       LISINOPRIL PO      Take 20 mg by mouth 2 times daily        methocarbamol 750 MG tablet    ROBAXIN    45 tablet    Take 1 tablet (750 mg) by mouth 4 times daily    Closed fracture of neck of right femur, initial encounter (H)       nateglinide 120 MG tablet    STARLIX    90 tablet    TAKE 1 TABLET BY MOUTH THREE TIMES DAILY BEFORE MEALS    Type 2 diabetes, controlled, with neuropathy (H)       OPTIFOAM 6\"X6\" Pads     1 each    1 Box once a week    Ulcer of right leg, with fat layer exposed (H)       * order for DME     2 each    Please measure and distribute 1 pair of 20mm Hg - 30mm Hg knee high ULCER CARE open or closed toe compression stockings.  Patient has a size 13 foot and please take this into " consideration.  Jobst or equivalent    Varicose veins of lower extremities with other complications, Venous stasis ulcer of right lower extremity (H)       * order for DME     3 each    Please measure and distribute 1 pair of 20mmHg - 30mmHg knee high open or closed toe compression stockings. Jobst ultrasheer or equivalent.    Varicose veins of both lower extremities with complications       * order for DME     2 each    Please measure and distribute 1 pair of 30mmHg - 40mmHg knee high open toe ulcercare compression stockings. Jobst ultrasheer or equivalent.    Varicose veins of bilateral lower extremities with other complications       oxyCODONE 5 MG IR tablet    ROXICODONE     Take 1-2 tablets (5-10 mg) by mouth every 3 hours as needed for moderate to severe pain And scheduled Oxycodone 10 mg at 0800 and 1300 prior to therapies    Closed fracture of neck of right femur, initial encounter (H)       senna-docusate 8.6-50 MG per tablet    SENOKOT-S;PERICOLACE    100 tablet    Take 2 tablets by mouth 2 times daily as needed for constipation    Constipation, unspecified constipation type       sildenafil 50 MG tablet    VIAGRA    10 tablet    Take 1 tablet (50 mg) by mouth daily as needed for erectile dysfunction    Vasculogenic erectile dysfunction, unspecified vasculogenic erectile dysfunction type       * silver sulfADIAZINE 1 % cream    SILVADENE    85 g    Apply topically daily To affected areas on right foot and leg.    Ulcer of right lower leg, with fat layer exposed (H), Chronic venous hypertension with ulcer involving right side (H), Type 2 diabetes, controlled, with neuropathy (H)       * SILVADENE 1 % cream   Generic drug:  silver sulfADIAZINE     20 g    Apply topically daily Dispensed at visit for heel and 5th mt ulcers.        simvastatin 10 MG tablet    ZOCOR    90 tablet    Take 1 tablet (10 mg) by mouth At Bedtime    Type 2 diabetes, controlled, with neuropathy (H)       sitagliptin 100 MG tablet     JANUVIA    90 tablet    Take 1 tablet (100 mg) by mouth daily    Type 2 diabetes, controlled, with neuropathy (H)       * Notice:  This list has 5 medication(s) that are the same as other medications prescribed for you. Read the directions carefully, and ask your doctor or other care provider to review them with you.

## 2017-10-19 NOTE — TELEPHONE ENCOUNTER
Mauricio Calderon- What supplies is he getting? I need the names of everything so I can check and where would he like to get them at? Thank you,  Allie

## 2017-10-20 ENCOUNTER — TELEPHONE (OUTPATIENT)
Dept: GASTROENTEROLOGY | Facility: CLINIC | Age: 72
End: 2017-10-20

## 2017-10-22 ENCOUNTER — MYC MEDICAL ADVICE (OUTPATIENT)
Dept: INTERNAL MEDICINE | Facility: CLINIC | Age: 72
End: 2017-10-22

## 2017-10-22 DIAGNOSIS — S72.001A CLOSED FRACTURE OF NECK OF RIGHT FEMUR, INITIAL ENCOUNTER (H): ICD-10-CM

## 2017-10-23 RX ORDER — CALCIUM CARBONATE 500(1250)
1 TABLET ORAL 2 TIMES DAILY WITH MEALS
Qty: 180 TABLET | Refills: 1 | Status: SHIPPED | OUTPATIENT
Start: 2017-10-23 | End: 2017-11-14

## 2017-10-24 RX ORDER — MAG/ALUMINUM/SOD BICARB/ALGINC 20 MG-80MG
TABLET,CHEWABLE ORAL
Qty: 150 TABLET | Refills: 0 | OUTPATIENT
Start: 2017-10-24

## 2017-10-25 DIAGNOSIS — Z79.4 TYPE 2 DIABETES MELLITUS WITH DIABETIC PERIPHERAL ANGIOPATHY WITHOUT GANGRENE, WITH LONG-TERM CURRENT USE OF INSULIN (H): ICD-10-CM

## 2017-10-25 DIAGNOSIS — E11.51 TYPE 2 DIABETES MELLITUS WITH DIABETIC PERIPHERAL ANGIOPATHY WITHOUT GANGRENE, WITH LONG-TERM CURRENT USE OF INSULIN (H): ICD-10-CM

## 2017-10-25 LAB
CHOLEST SERPL-MCNC: 92 MG/DL
HBA1C MFR BLD: 6.5 % (ref 4.3–6)
HDLC SERPL-MCNC: 41 MG/DL
LDLC SERPL CALC-MCNC: 30 MG/DL
NONHDLC SERPL-MCNC: 50 MG/DL
TRIGL SERPL-MCNC: 100 MG/DL

## 2017-10-27 NOTE — TELEPHONE ENCOUNTER
According to the recording [place] is secondary to Medicare and medical supplies are covered at 80% when given in a outpatient setting. Patient has met his deducible of $300.00 his maximum out of pocket is $1,750.00 so far he has met $949.46. There is no PA needed for this. Call ref.  [id number] 10/27/17 2:55pm

## 2017-10-31 ENCOUNTER — OFFICE VISIT (OUTPATIENT)
Dept: PODIATRY | Facility: CLINIC | Age: 72
End: 2017-10-31
Payer: MEDICARE

## 2017-10-31 VITALS — DIASTOLIC BLOOD PRESSURE: 68 MMHG | HEART RATE: 101 BPM | SYSTOLIC BLOOD PRESSURE: 139 MMHG | OXYGEN SATURATION: 99 %

## 2017-10-31 DIAGNOSIS — L97.912 ULCER OF RIGHT LOWER EXTREMITY WITH FAT LAYER EXPOSED (H): ICD-10-CM

## 2017-10-31 DIAGNOSIS — E11.49 TYPE II OR UNSPECIFIED TYPE DIABETES MELLITUS WITH NEUROLOGICAL MANIFESTATIONS, NOT STATED AS UNCONTROLLED(250.60) (H): ICD-10-CM

## 2017-10-31 DIAGNOSIS — L97.422 ULCER OF LEFT HEEL AND MIDFOOT WITH FAT LAYER EXPOSED (H): Primary | ICD-10-CM

## 2017-10-31 DIAGNOSIS — E11.51 DIABETES MELLITUS WITH PERIPHERAL VASCULAR DISEASE (H): ICD-10-CM

## 2017-10-31 PROCEDURE — 99213 OFFICE O/P EST LOW 20 MIN: CPT | Performed by: PODIATRIST

## 2017-10-31 NOTE — MR AVS SNAPSHOT
After Visit Summary   10/31/2017    Amos Walker    MRN: [id number]           Patient Information     Date Of Birth          1945        Visit Information        Provider Department      10/31/2017 10:45 AM Brayan Mcclain DPM [facility]        Today's Diagnoses     Ulcer of left heel and midfoot with fat layer exposed (H)    -  1    Ulcer of right lower extremity with fat layer exposed (H)        Type II or unspecified type diabetes mellitus with neurological manifestations, not stated as uncontrolled(250.60) (H)        Diabetes mellitus with peripheral vascular disease (H)          Care Instructions    Thanks for coming today.  Or[facility] Clinic  [address] [place] [address]    To schedule future appointments in Ortho Clinic, you may call [phone].    To schedule ordered imaging by your Provider: Call [facility] at [phone]    Socii available online at:   [email or web address]    Please call if any further questions or concerns [phone] and ask for the Orthopedic Department. Clinic hours 8 am to 5 pm.    Return to clinic if symptoms worsen.            Follow-ups after your visit        Follow-up notes from your care team     Return in about 2 weeks (around 11/14/2017).      Your next 10 appointments already scheduled     Nov 14, 2017  2:00 PM CST   [facility] with B[facility]an [facility]clain DPM   [facility]    [address] [place] [address] [phone]            Dec 29, 2017 11:25 AM CST   (Arrive by 11:10 AM)   Return Visit with Racheal Swift MD   [facility] ([facility])    [address] [place] [address]   [phone]            Jun 20, 2018 10:30 AM CDT   RETURN RETINA with Jen Aranda MD   Eye Clinic ([facility])    [address]  [address] Clin [address]  [facility] [address]   [phone]              Who to contact     If you have questions or need follow up information about today's clinic visit or your schedule please contact [facility] directly at [phone].  Normal or non-critical lab and imaging results will be communicated to you by MyChart, letter or phone within 4 business days after the clinic has received the results. If you do not hear from us within 7 days, please contact the clinic through MyChart or phone. If you have a critical or abnormal lab result, we will notify you by phone as soon as possible.  Submit refill requests through [employer] or call your pharmacy and they will forward the refill request to us. Please allow 3 business days for your refill to be completed.          Additional Information About Your Visit        [place] Information     [employer] gives you secure access to your electronic health record. If you see a primary care provider, you can also send messages to your care team and make appointments. If you have questions, please call your primary care clinic.  If you do not have a primary care provider, please call [phone] and they will assist you.      [employer] is an electronic gateway that provides easy, online access to your medical records. With [employer], you can request a clinic appointment, read your test results, renew a prescription or communicate with your care team.     To access your existing account, please contact your [facility] or call [phone] for assistance.        Care EveryWhere ID     This is your Care EveryWhere ID. This could be used by other organizations to access your [facility] medical records  [id number]        Your Vitals Were     Pulse Pulse Oximetry                101 99%           Blood Pressure from Last 3 Encounters:   10/31/17 139/68   10/19/17 107/67   10/16/17 140/72    Weight from Last 3 Encounters:   10/19/17 83.5  kg (184 lb)   10/10/17 81.2 kg (179 lb)   09/11/17 77.7 kg (171 lb 6.4 oz)              Today, you had the following     No orders found for display       Primary Care Provider Office Phone # Fax #    Racheal Swift [phone]       [address] [place] [address]  [facility] [address]        Equal Access to Services     SAMSON MELTON : Hadii aad ku hadasho Soomaali, waaxda luqadaha, qaybta kaalmada adeegyada, waxay idiin hayaan adeeg kharash la'alann ah. So [facility] [phone].    ATENCIÓN: Si habla espjono, tiene a rodrigues disposición servicios gratuitos de asistencia lingüística. Llame al [phone].    We comply with applicable federal civil rights laws and [place] laws. We do not discriminate on the basis of race, color, national origin, age, disability, sex, sexual orientation, or gender identity.            Thank you!     Thank you for choosing [facility]  for your care. Our goal is always to provide you with excellent care. Hearing back from our patients is one way we can continue to improve our services. Please take a few minutes to complete the written survey that you may receive in the mail after your visit with us. Thank you!             Your Updated Medication List - Protect others around you: Learn how to safely use, store and throw away your medicines at www.disposemymeds.org.          This list is accurate as of: 10/31/17 11:59 PM.  Always use your most recent med list.                   Brand Name Dispense Instructions for use Diagnosis    acetaminophen 500 MG tablet    TYLENOL     Take 2 tablets (1,000 mg) by mouth 3 times daily    Closed fracture of neck of right femur, initial encounter (H)       ammonium lactate 12 % cream    LAC-HYDRIN    385 g    Apply topically 2 times daily as needed for dry skin    Venous stasis, Type 2 diabetes, controlled, with neuropathy (H)       ascorbic acid 500 MG Tabs     30 tablet    Take 1 tablet (500 mg) by mouth 2 times daily    Ulcer  of right lower leg, with fat layer exposed (H)       blood glucose monitoring lancets     3 Box    Use to test blood sugars 2 as directed.    Type 2 diabetes, uncontrolled, with neuropathy (H)       blood glucose monitoring test strip    ONE TOUCH ULTRA    60 strip    Use to test blood sugars 2 times daily or as directed.    Type 2 diabetes mellitus (H)       calcium carbonate 1250 MG tablet    OS-CLAUDIA 500 mg [place]ski. Ca    180 tablet    Take 1 tablet (1,250 mg) by mouth 2 times daily (with meals)    Closed fracture of neck of right femur, initial encounter (H)       Cholecalciferol 3000 UNITS Tabs     30 tablet    Take 3,000 Units by mouth daily    Closed fracture of neck of right femur, initial encounter (H)       clopidogrel 75 MG tablet    PLAVIX    30 tablet    Take 1 tablet (75 mg) by mouth daily    Peripheral vascular disease, unspecified       COLACE PO           ferrous sulfate 325 (65 FE) MG tablet    IRON    60 tablet    Take 1 tablet (325 mg) by mouth 2 times daily    Peripheral vascular disease, unspecified       gentamicin 0.1 % cream    GARAMYCIN    30 g    Apply topically daily To right leg ulcer.    Ulcer of right lower leg, with fat layer exposed (H), Chronic venous hypertension with ulcer involving right side (H), Type 2 diabetes, controlled, with neuropathy (H)       HYDROCHLOROTHIAZIDE PO      Take 12.5 mg by mouth every other day        hydrocortisone 0.2 % cream    WESTCORT    15 g    Apply sparingly to affected area twice times daily for 14 days.    Dermatitis       insulin glargine 100 UNIT/ML injection    LANTUS     Inject 28 Units Subcutaneous every morning        insulin pen needle 31G X 8 MM    B-D U/F    100 each    Use 1 daily o as directed    Diabetes mellitus, type II (H)       LISINOPRIL PO      Take 20 mg by mouth 2 times daily        methocarbamol 750 MG tablet    ROBAXIN    45 tablet    Take 1 tablet (750 mg) by mouth 4 times daily    Closed fracture of neck of right femur, initial  "encounter (H)       nateglinide 120 MG tablet    STARLIX    90 tablet    TAKE 1 TABLET BY MOUTH THREE TIMES DAILY BEFORE MEALS    Type 2 diabetes, controlled, with neuropathy (H)       OPTIFOAM 6\"X6\" Pads     1 each    1 Box once a week    Ulcer of right leg, with fat layer exposed (H)       * order for DME     2 each    Please measure and distribute 1 pair of 20mm Hg - 30mm Hg knee high ULCER CARE open or closed toe compression stockings.  Patient has a size 13 foot and please take this into consideration.  Jobst or equivalent    Varicose veins of lower extremities with other complications, Venous stasis ulcer of right lower extremity (H)       * order for DME     3 each    Please measure and distribute 1 pair of 20mmHg - 30mmHg knee high open or closed toe compression stockings. Jobst ultrasheer or equivalent.    Varicose veins of both lower extremities with complications       * order for DME     2 each    Please measure and distribute 1 pair of 30mmHg - 40mmHg knee high open toe ulcercare compression stockings. Jobst ultrasheer or equivalent.    Varicose veins of bilateral lower extremities with other complications       oxyCODONE 5 MG IR tablet    ROXICODONE     Take 1-2 tablets (5-10 mg) by mouth every 3 hours as needed for moderate to severe pain And scheduled Oxycodone 10 mg at 0800 and 1300 prior to therapies    Closed fracture of neck of right femur, initial encounter (H)       senna-docusate 8.6-50 MG per tablet    SENOKOT-S;PERICOLACE    100 tablet    Take 2 tablets by mouth 2 times daily as needed for constipation    Constipation, unspecified constipation type       sildenafil 50 MG tablet    VIAGRA    10 tablet    Take 1 tablet (50 mg) by mouth daily as needed for erectile dysfunction    Vasculogenic erectile dysfunction, unspecified vasculogenic erectile dysfunction type       * silver sulfADIAZINE 1 % cream    SILVADENE    85 g    Apply topically daily To affected areas on right foot and leg.    " Ulcer of right lower leg, with fat layer exposed (H), Chronic venous hypertension with ulcer involving right side (H), Type 2 diabetes, controlled, with neuropathy (H)       * SILVADENE 1 % cream   Generic drug:  silver sulfADIAZINE     20 g    Apply topically daily Dispensed at visit for heel and 5th mt ulcers.        simvastatin 10 MG tablet    ZOCOR    90 tablet    Take 1 tablet (10 mg) by mouth At Bedtime    Type 2 diabetes, controlled, with neuropathy (H)       sitagliptin 100 MG tablet    JANUVIA    90 tablet    Take 1 tablet (100 mg) by mouth daily    Type 2 diabetes, controlled, with neuropathy (H)       * Notice:  This list has 5 medication(s) that are the same as other medications prescribed for you. Read the directions carefully, and ask your doctor or other care provider to review them with you.
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
## 2017-10-31 NOTE — PATIENT INSTRUCTIONS
Thanks for coming today.  Ortho/Sports Medicine Clinic  [address] [place] [address]    To schedule future appointments in Ortho Clinic, you may call [phone].    To schedule ordered imaging by your Provider: Call [facility] at [phone]    [employer] available online at:   [email or web address]    Please call if any further questions or concerns [phone] and ask for the Orthopedic Department. Clinic hours 8 am to 5 pm.    Return to clinic if symptoms worsen.

## 2017-10-31 NOTE — NURSING NOTE
"Amos Walker's goals for this visit include: recheck wounds  He requests these members of his care team be copied on today's visit information: yes    PCP: Racheal Swift    Referring Provider:  Referred Self, MD  No address on file    Chief Complaint   Patient presents with     RECHECK     wound check        Initial /68  Pulse 101  SpO2 99% Estimated body mass index is 22.55 kg/(m^2) as calculated from the following:    Height as of 10/19/17: 1.924 m (6' 3.75\").    Weight as of 10/19/17: 83.5 kg (184 lb).  Medication Reconciliation: complete    "

## 2017-11-14 ENCOUNTER — OFFICE VISIT (OUTPATIENT)
Dept: PODIATRY | Facility: CLINIC | Age: 72
End: 2017-11-14
Payer: MEDICARE

## 2017-11-14 VITALS — DIASTOLIC BLOOD PRESSURE: 64 MMHG | HEART RATE: 98 BPM | OXYGEN SATURATION: 98 % | SYSTOLIC BLOOD PRESSURE: 138 MMHG

## 2017-11-14 DIAGNOSIS — L97.912 ULCER OF LEG, CHRONIC, RIGHT, WITH FAT LAYER EXPOSED (H): Primary | ICD-10-CM

## 2017-11-14 DIAGNOSIS — L30.9 DERMATITIS OF LEFT FOOT: ICD-10-CM

## 2017-11-14 DIAGNOSIS — E11.49 TYPE II OR UNSPECIFIED TYPE DIABETES MELLITUS WITH NEUROLOGICAL MANIFESTATIONS, NOT STATED AS UNCONTROLLED(250.60) (H): ICD-10-CM

## 2017-11-14 DIAGNOSIS — I87.8 VENOUS STASIS: ICD-10-CM

## 2017-11-14 DIAGNOSIS — L97.512 SKIN ULCER OF RIGHT FOOT WITH FAT LAYER EXPOSED (H): ICD-10-CM

## 2017-11-14 DIAGNOSIS — E11.51 DIABETES MELLITUS WITH PERIPHERAL VASCULAR DISEASE (H): ICD-10-CM

## 2017-11-14 LAB
BASOPHILS # BLD AUTO: 0.1 10E9/L (ref 0–0.2)
BASOPHILS NFR BLD AUTO: 1 %
CRP SERPL-MCNC: 59.6 MG/L (ref 0–8)
DIFFERENTIAL METHOD BLD: ABNORMAL
EOSINOPHIL # BLD AUTO: 0.1 10E9/L (ref 0–0.7)
EOSINOPHIL NFR BLD AUTO: 1 %
ERYTHROCYTE [DISTWIDTH] IN BLOOD BY AUTOMATED COUNT: 13.7 % (ref 10–15)
ERYTHROCYTE [SEDIMENTATION RATE] IN BLOOD BY WESTERGREN METHOD: 49 MM/H (ref 0–20)
HCT VFR BLD AUTO: 33.5 % (ref 40–53)
HGB BLD-MCNC: 11 G/DL (ref 13.3–17.7)
LYMPHOCYTES # BLD AUTO: 1.2 10E9/L (ref 0.8–5.3)
LYMPHOCYTES NFR BLD AUTO: 14 %
MCH RBC QN AUTO: 30.8 PG (ref 26.5–33)
MCHC RBC AUTO-ENTMCNC: 32.8 G/DL (ref 31.5–36.5)
MCV RBC AUTO: 94 FL (ref 78–100)
MONOCYTES # BLD AUTO: 0.7 10E9/L (ref 0–1.3)
MONOCYTES NFR BLD AUTO: 8 %
NEUTROPHILS # BLD AUTO: 6.2 10E9/L (ref 1.6–8.3)
NEUTROPHILS NFR BLD AUTO: 76 %
PLATELET # BLD AUTO: 267 10E9/L (ref 150–450)
RBC # BLD AUTO: 3.57 10E12/L (ref 4.4–5.9)
WBC # BLD AUTO: 8.3 10E9/L (ref 4–11)

## 2017-11-14 PROCEDURE — 86140 C-REACTIVE PROTEIN: CPT | Performed by: PODIATRIST

## 2017-11-14 PROCEDURE — 97597 DBRDMT OPN WND 1ST 20 CM/<: CPT | Performed by: PODIATRIST

## 2017-11-14 RX ORDER — TRIAMCINOLONE ACETONIDE 1 MG/G
CREAM TOPICAL
Qty: 30 G | Refills: 1 | Status: SHIPPED | OUTPATIENT
Start: 2017-11-14 | End: 2017-11-14

## 2017-11-14 RX ORDER — TRIAMCINOLONE ACETONIDE 1 MG/G
CREAM TOPICAL
Qty: 30 G | Refills: 1 | Status: SHIPPED | OUTPATIENT
Start: 2017-11-14 | End: 2017-12-12

## 2017-11-14 RX ORDER — LEVOFLOXACIN 750 MG/1
750 TABLET, FILM COATED ORAL DAILY
Qty: 14 TABLET | Refills: 0 | Status: SHIPPED | OUTPATIENT
Start: 2017-11-14 | End: 2017-12-29

## 2017-11-14 RX ORDER — LEVOFLOXACIN 750 MG/1
750 TABLET, FILM COATED ORAL DAILY
Qty: 14 TABLET | Refills: 0 | Status: SHIPPED | OUTPATIENT
Start: 2017-11-14 | End: 2017-11-14

## 2017-11-14 RX ORDER — CLINDAMYCIN HCL 300 MG
300 CAPSULE ORAL 3 TIMES DAILY
Qty: 30 CAPSULE | Refills: 0 | Status: SHIPPED | OUTPATIENT
Start: 2017-11-14 | End: 2017-11-14

## 2017-11-14 RX ORDER — CLINDAMYCIN HCL 300 MG
300 CAPSULE ORAL 3 TIMES DAILY
Qty: 30 CAPSULE | Refills: 0 | Status: SHIPPED | OUTPATIENT
Start: 2017-11-14 | End: 2017-12-29

## 2017-11-14 ASSESSMENT — PAIN SCALES - GENERAL: PAINLEVEL: MILD PAIN (2)

## 2017-11-14 NOTE — MR AVS SNAPSHOT
After Visit Summary   11/14/2017    Amos Walker    MRN: [id number]           Patient Information     Date Of Birth          1945        Visit Information        Provider Department      11/14/2017 2:00 PM Brayan Mcclain DPM [facility]        Today's Diagnoses     Ulcer of leg, chronic, right, with fat layer exposed (H)    -  1    Skin ulcer of right foot with fat layer exposed (H)        Venous stasis        Type II or unspecified type diabetes mellitus with neurological manifestations, not stated as uncontrolled(250.60) (H)        Diabetes mellitus with peripheral vascular disease (H)        Dermatitis of left foot           Follow-ups after your visit        Additional Services     ORTHOTICS REFERRAL       *This referral order prints off in the [facility]  (Orthotics & Prosthetics) Central Scheduling Office**    The [facility] Scheduling Staff will contact the patient to schedule appointments.     Central Scheduling Contact Information: [phone] ([place])    Diabetic shoes.  Custom Diabetic foot orthotics.      Please be aware that coverage of these services is subject to the terms and limitations of your health insurance plan.  Call member services at your health plan with any benefit or coverage questions.      Please bring the following to your appointment:    >>   Any x-rays, CTs or MRIs which have been performed.  Contact the facility where they were done to arrange for  prior to your scheduled appointment.    >>   List of current medications   >>   This referral request   >>   Any documents/labs given to you for this referral                  Your next 10 appointments already scheduled     Nov 17, 2017  8:30 AM CST   Return Visit with Brayan Mcclain DPM   [facility] ([facility])    [address] [place] [id number]   [phone]            Dec 29, 2017 11:25 AM CST    (Arrive by 11:10 AM)   Return Visit with Racheal Swift MD   [facility] ([facility])    [address]  4th [address]  [facility] [id number] [phone]            Jun 20, 2018 10:30 AM CDT   RETURN RETINA with Jen Aranda MD   [facility] ([facility])    [address]  [address]  [address]  [facility] [id number] [phone]              Who to contact     If you have questions or need follow up information about today's clinic visit or your schedule please contact [facility] directly at [phone].  Normal or non-critical lab and imaging results will be communicated to you by [email or web address], letter or phone within 4 business days after the clinic has received the results. If you do not hear from us within 7 days, please contact the clinic through [email or web address] or phone. If you have a critical or abnormal lab result, we will notify you by phone as soon as possible.  Submit refill requests through [employer] or call your pharmacy and they will forward the refill request to us. Please allow 3 business days for your refill to be completed.          Additional Information About Your Visit        [employer] Information     [employer] gives you secure access to your electronic health record. If you see a primary care provider, you can also send messages to your care team and make appointments. If you have questions, please call your primary care clinic.  If you do not have a primary care provider, please call [phone] and they will assist you.      [employer] is an electronic gateway that provides easy, online access to your medical records. With [employer], you can request a clinic appointment, read your test results, renew a prescription or communicate with your care team.     To access your existing account, please contact your [facility] or call [phone] for assistance.         Care EveryWhere ID     This is your Care EveryWhere ID. This could be used by other organizations to access your [place] medical records  [id number]        Your Vitals Were     Pulse Pulse Oximetry                98 98%           Blood Pressure from Last 3 Encounters:   11/14/17 138/64   10/31/17 139/68   10/19/17 107/67    Weight from Last 3 Encounters:   10/19/17 83.5 kg (184 lb)   10/10/17 81.2 kg (179 lb)   09/11/17 77.7 kg (171 lb 6.4 oz)              We Performed the Following     CBC with platelets differential     CRP inflammation     DEBRIDEMENT WOUND UP TO 20 SQ CM     Erythrocyte sedimentation rate auto     ORTHOTICS REFERRAL          Today's Medication Changes          These changes are accurate as of: 11/14/17 11:59 PM.  If you have any questions, ask your nurse or doctor.               Start taking these medicines.        Dose/Directions    clindamycin 300 MG capsule   Commonly known as:  CLEOCIN   Used for:  Ulcer of leg, chronic, right, with fat layer exposed (H), Skin ulcer of right foot with fat layer exposed (H), Type II or unspecified type diabetes mellitus with neurological manifestations, not stated as uncontrolled(250.60) (H)   Started by:  Brayan Mcclain DPM        Dose:  300 mg   Take 1 capsule (300 mg) by mouth 3 times daily   Quantity:  30 capsule   Refills:  0       levofloxacin 750 MG tablet   Commonly known as:  LEVAQUIN   Used for:  Ulcer of leg, chronic, right, with fat layer exposed (H), Skin ulcer of right foot with fat layer exposed (H), Type II or unspecified type diabetes mellitus with neurological manifestations, not stated as uncontrolled(250.60) (H)   Started by:  Brayan Mcclain DPM        Dose:  750 mg   Take 1 tablet (750 mg) by mouth daily   Quantity:  14 tablet   Refills:  0       triamcinolone 0.1 % cream   Commonly known as:  KENALOG   Used for:  Dermatitis of left foot   Started by:  Brayan Mcclain DPM        Apply sparingly to left heel daily.   Quantity:   30 g   Refills:  1            Where to get your medicines      These medications were sent to [id number] [place] [id number] AT [facility] [address], [facility] [address]     Phone:  [phone]     clindamycin 300 MG capsule    levofloxacin 750 MG tablet    triamcinolone 0.1 % cream                Primary Care Provider Office Phone # Fax #    Racheal Swift [phone]       [address] [place] [address] [address] [place] [address]        Equal Access to Services     [facility] : Hadii aad ku hadasho Soomaali, waaxda luqadaha, qaybta kaalmada adeegyada, yolanda duran . [facility] [phone].    ATENCIÓN: Si habla español, tiene a rodrigues disposición servicios gratuitos de asistencia lingüística. [facility] [phone].    We comply with applicable federal civil rights laws and [place] laws. We do not discriminate on the basis of race, color, national origin, age, disability, sex, sexual orientation, or gender identity.            Thank you!     Thank you for choosing [facility]  for your care. Our goal is always to provide you with excellent care. Hearing back from our patients is one way we can continue to improve our services. Please take a few minutes to complete the written survey that you may receive in the mail after your visit with us. Thank you!             Your Updated Medication List - Protect others around you: Learn how to safely use, store and throw away your medicines at www.disposemymeds.org.          This list is accurate as of: 11/14/17 11:59 PM.  Always use your most recent med list.                   Brand Name Dispense Instructions for use Diagnosis    acetaminophen 500 MG tablet    TYLENOL     Take 2 tablets (1,000 mg) by mouth 3 times daily    Closed fracture of neck of right femur, initial encounter (H)       ammonium lactate 12 % cream    LAC-HYDRIN    385 g    Apply topically 2  times daily as needed for dry skin    Venous stasis, Type 2 diabetes, controlled, with neuropathy (H)       ascorbic acid 500 MG Tabs     30 tablet    Take 1 tablet (500 mg) by mouth 2 times daily    Ulcer of right lower leg, with fat layer exposed (H)       blood glucose monitoring lancets     3 Box    Use to test blood sugars 2 as directed.    Type 2 diabetes, uncontrolled, with neuropathy (H)       blood glucose monitoring test strip    ONETOUCH ULTRA    60 strip    Use to test blood sugars 2 times daily or as directed.    Type 2 diabetes mellitus (H)       Cholecalciferol 3000 UNITS Tabs     30 tablet    Take 3,000 Units by mouth daily    Closed fracture of neck of right femur, initial encounter (H)       clindamycin 300 MG capsule    CLEOCIN    30 capsule    Take 1 capsule (300 mg) by mouth 3 times daily    Ulcer of leg, chronic, right, with fat layer exposed (H), Skin ulcer of right foot with fat layer exposed (H), Type II or unspecified type diabetes mellitus with neurological manifestations, not stated as uncontrolled(250.60) (H)       clopidogrel 75 MG tablet    PLAVIX    30 tablet    Take 1 tablet (75 mg) by mouth daily    Peripheral vascular disease, unspecified       COLACE PO           ferrous sulfate 325 (65 FE) MG tablet    IRON    60 tablet    Take 1 tablet (325 mg) by mouth 2 times daily    Peripheral vascular disease, unspecified       gentamicin 0.1 % cream    GARAMYCIN    30 g    Apply topically daily To right leg ulcer.    Ulcer of right lower leg, with fat layer exposed (H), Chronic venous hypertension with ulcer involving right side (H), Type 2 diabetes, controlled, with neuropathy (H)       hydrocortisone 0.2 % cream    WESTCORT    15 g    Apply sparingly to affected area twice times daily for 14 days.    Dermatitis       insulin glargine 100 UNIT/ML injection    LANTUS     Inject 28 Units Subcutaneous every morning        insulin pen needle 31G X 8 MM    B-D U/F    100 each    Use 1 daily o  "as directed    Diabetes mellitus, type II (H)       levofloxacin 750 MG tablet    LEVAQUIN    14 tablet    Take 1 tablet (750 mg) by mouth daily    Ulcer of leg, chronic, right, with fat layer exposed (H), Skin ulcer of right foot with fat layer exposed (H), Type II or unspecified type diabetes mellitus with neurological manifestations, not stated as uncontrolled(250.60) (H)       LISINOPRIL PO      Take 20 mg by mouth 2 times daily        methocarbamol 750 MG tablet    ROBAXIN    45 tablet    Take 1 tablet (750 mg) by mouth 4 times daily    Closed fracture of neck of right femur, initial encounter (H)       nateglinide 120 MG tablet    STARLIX    90 tablet    TAKE 1 TABLET BY MOUTH THREE TIMES DAILY BEFORE MEALS    Type 2 diabetes, controlled, with neuropathy (H)       OPTIFOAM 6\"X6\" Pads     1 each    1 Box once a week    Ulcer of right leg, with fat layer exposed (H)       * order for DME     2 each    Please measure and distribute 1 pair of 20mm Hg - 30mm Hg knee high ULCER CARE open or closed toe compression stockings.  Patient has a size 13 foot and please take this into consideration.  Jobst or equivalent    Varicose veins of lower extremities with other complications, Venous stasis ulcer of right lower extremity (H)       * order for DME     3 each    Please measure and distribute 1 pair of 20mmHg - 30mmHg knee high open or closed toe compression stockings. Jobst ultrasheer or equivalent.    Varicose veins of both lower extremities with complications       * order for DME     2 each    Please measure and distribute 1 pair of 30mmHg - 40mmHg knee high open toe ulcercare compression stockings. Jobst ultrasheer or equivalent.    Varicose veins of bilateral lower extremities with other complications       oxyCODONE IR 5 MG tablet    ROXICODONE     Take 1-2 tablets (5-10 mg) by mouth every 3 hours as needed for moderate to severe pain And scheduled Oxycodone 10 mg at 0800 and 1300 prior to therapies    Closed " fracture of neck of right femur, initial encounter (H)       senna-docusate 8.6-50 MG per tablet    SENOKOT-S;PERICOLACE    100 tablet    Take 2 tablets by mouth 2 times daily as needed for constipation    Constipation, unspecified constipation type       sildenafil 50 MG tablet    VIAGRA    10 tablet    Take 1 tablet (50 mg) by mouth daily as needed for erectile dysfunction    Vasculogenic erectile dysfunction, unspecified vasculogenic erectile dysfunction type       * silver sulfADIAZINE 1 % cream    SILVADENE    85 g    Apply topically daily To affected areas on right foot and leg.    Ulcer of right lower leg, with fat layer exposed (H), Chronic venous hypertension with ulcer involving right side (H), Type 2 diabetes, controlled, with neuropathy (H)       * SILVADENE 1 % cream   Generic drug:  silver sulfADIAZINE     20 g    Apply topically daily Dispensed at visit for heel and 5th mt ulcers.        simvastatin 10 MG tablet    ZOCOR    90 tablet    Take 1 tablet (10 mg) by mouth At Bedtime    Type 2 diabetes, controlled, with neuropathy (H)       sitagliptin 100 MG tablet    JANUVIA    90 tablet    Take 1 tablet (100 mg) by mouth daily    Type 2 diabetes, controlled, with neuropathy (H)       triamcinolone 0.1 % cream    KENALOG    30 g    Apply sparingly to left heel daily.    Dermatitis of left foot       * Notice:  This list has 5 medication(s) that are the same as other medications prescribed for you. Read the directions carefully, and ask your doctor or other care provider to review them with you.
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
## 2017-11-14 NOTE — NURSING NOTE
"Amos Walker's goals for this visit include: Recheck bilateral foot wounds  He requests these members of his care team be copied on today's visit information: yes    PCP: Racheal Swift    Referring Provider:  Referred Self, MD  No address on file    No chief complaint on file.      Initial /64  Pulse 98  SpO2 98% Estimated body mass index is 22.55 kg/(m^2) as calculated from the following:    Height as of 10/19/17: 1.924 m (6' 3.75\").    Weight as of 10/19/17: 83.5 kg (184 lb).  Medication Reconciliation: complete    "

## 2017-11-17 ENCOUNTER — OFFICE VISIT (OUTPATIENT)
Dept: PODIATRY | Facility: CLINIC | Age: 72
End: 2017-11-17
Payer: MEDICARE

## 2017-11-17 ENCOUNTER — TELEPHONE (OUTPATIENT)
Dept: PODIATRY | Facility: CLINIC | Age: 72
End: 2017-11-17

## 2017-11-17 VITALS
DIASTOLIC BLOOD PRESSURE: 60 MMHG | OXYGEN SATURATION: 98 % | TEMPERATURE: 98.1 F | SYSTOLIC BLOOD PRESSURE: 138 MMHG | HEART RATE: 98 BPM

## 2017-11-17 DIAGNOSIS — E11.51 DIABETES MELLITUS WITH PERIPHERAL VASCULAR DISEASE (H): ICD-10-CM

## 2017-11-17 DIAGNOSIS — E11.49 TYPE II OR UNSPECIFIED TYPE DIABETES MELLITUS WITH NEUROLOGICAL MANIFESTATIONS, NOT STATED AS UNCONTROLLED(250.60) (H): ICD-10-CM

## 2017-11-17 DIAGNOSIS — L97.912 ULCER OF RIGHT LOWER EXTREMITY WITH FAT LAYER EXPOSED (H): ICD-10-CM

## 2017-11-17 DIAGNOSIS — L97.512 SKIN ULCER OF RIGHT FOOT WITH FAT LAYER EXPOSED (H): Primary | ICD-10-CM

## 2017-11-17 PROCEDURE — 99213 OFFICE O/P EST LOW 20 MIN: CPT | Performed by: PODIATRIST

## 2017-11-17 ASSESSMENT — PAIN SCALES - GENERAL: PAINLEVEL: MODERATE PAIN (4)
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
## 2017-11-17 NOTE — TELEPHONE ENCOUNTER
Financial Counselor Review for Apligraf, Dermagraft or Puraply AM:    Procedure to be performed (include CPT code):Yes Apligraf    Diagnosis code (include ICD-10 code):L97.512; 250.60; E11.49; E11.51    Coverage and patient financial responsibility information:YES    Does patient need to be contacted by Financial Counselor:YES    Has the patient tried Apligraf, Dermagraft or Puraply before    Note: Do not use abbreviations and route encounter to  Podiatry Pool

## 2017-11-17 NOTE — NURSING NOTE
"Amos Walker's goals for this visit include: Recheck leg ulcer  He requests these members of his care team be copied on today's visit information: yes    PCP: Racheal Swift    Referring Provider:  Referred Self, MD  No address on file    Chief Complaint   Patient presents with     RECHECK     Right leg ulcer       Initial /60  Pulse 98  Temp 98.1  F (36.7  C)  SpO2 98% Estimated body mass index is 22.55 kg/(m^2) as calculated from the following:    Height as of 10/19/17: 1.924 m (6' 3.75\").    Weight as of 10/19/17: 83.5 kg (184 lb).  Medication Reconciliation: complete    "

## 2017-11-17 NOTE — PATIENT INSTRUCTIONS
Thanks for coming today.  [facility]  [address] [place] [address]    To schedule future appointments in Ortho Clinic, you may call [phone].    To schedule ordered imaging by your provider:   Call [facility] [phone]    To schedule an injection ordered by your provider:  Call [facility] Injection scheduling line: [phone]  [employer] available online at:  [email or web address]    Please call if any further questions or concerns ([phone]).  Clinic hours 8 am to 5 pm.    Return to clinic (call) if symptoms worsen or fail to improve.

## 2017-11-21 ENCOUNTER — OFFICE VISIT (OUTPATIENT)
Dept: PODIATRY | Facility: CLINIC | Age: 72
End: 2017-11-21
Payer: MEDICARE

## 2017-11-21 VITALS — OXYGEN SATURATION: 98 % | HEART RATE: 102 BPM | DIASTOLIC BLOOD PRESSURE: 77 MMHG | SYSTOLIC BLOOD PRESSURE: 156 MMHG

## 2017-11-21 DIAGNOSIS — E11.51 DIABETES MELLITUS WITH PERIPHERAL VASCULAR DISEASE (H): ICD-10-CM

## 2017-11-21 DIAGNOSIS — L97.512 SKIN ULCER OF RIGHT FOOT WITH FAT LAYER EXPOSED (H): ICD-10-CM

## 2017-11-21 DIAGNOSIS — E11.49 TYPE II OR UNSPECIFIED TYPE DIABETES MELLITUS WITH NEUROLOGICAL MANIFESTATIONS, NOT STATED AS UNCONTROLLED(250.60) (H): ICD-10-CM

## 2017-11-21 DIAGNOSIS — L97.912 ULCER OF RIGHT LOWER EXTREMITY WITH FAT LAYER EXPOSED (H): Primary | ICD-10-CM

## 2017-11-21 LAB
CRP SERPL-MCNC: 4.8 MG/L (ref 0–8)
ERYTHROCYTE [SEDIMENTATION RATE] IN BLOOD BY WESTERGREN METHOD: 40 MM/H (ref 0–20)

## 2017-11-21 PROCEDURE — 85652 RBC SED RATE AUTOMATED: CPT | Performed by: PODIATRIST

## 2017-11-21 PROCEDURE — 97597 DBRDMT OPN WND 1ST 20 CM/<: CPT | Performed by: PODIATRIST

## 2017-11-21 PROCEDURE — 86140 C-REACTIVE PROTEIN: CPT | Performed by: PODIATRIST

## 2017-11-21 NOTE — MR AVS SNAPSHOT
After Visit Summary   11/21/2017    Amos Walker    MRN: [id number]           Patient Information     Date Of Birth          1945        Visit Information        Provider Department      11/21/2017 7:30 AM Brayan Mcclain DPM [facility]        Today's Diagnoses     Ulcer of right lower extremity with fat layer exposed (H)    -  1    Skin ulcer of right foot with fat layer exposed (H)        Type II or unspecified type diabetes mellitus with neurological manifestations, not stated as uncontrolled(250.60) (H)        Diabetes mellitus with peripheral vascular disease (H)          Care Instructions    Thanks for coming today.  Ortho/Sports Medicine Clinic  [address] [place] [address]    To schedule future appointments in Ortho Clinic, you may call [phone].    To schedule ordered imaging by your Provider: Call [facility] at [phone]    Trusera available online at:   [email or web address]    Please call if any further questions or concerns [phone] and ask for the Orthopedic Department. Clinic hours 8 am to 5 pm.    Return to clinic if symptoms worsen.            Follow-ups after your visit        Your next 10 appointments already scheduled     Nov 28, 2017 10:00 AM CST   Return Visit with Brayan Mcclain DPM   [facility] ([facility])    [address] [place] [address] [phone]            Nov 30, 2017 11:40 AM CST   (Arrive by 11:25 AM)   Return Vascular Visit with Lane Jenkins MD   [facility] ([facility])    [address] [place] [id number] [phone]            Dec 05, 2017 10:00 AM CST   Return Visit with Brayan Mcclain DPM   [facility] ([facility])    [address] [place] [address] [phone]            Dec 12, 2017 10:00 AM CST   Return Visit with  Brayan Mcclain DPM   [facility] ([facility])    [address] [facility] [address]   [phone]            Dec 19, 2017 10:00 AM CST   Return Visit with Brayan Mcclain DPM   [facility] ([facility])    [address] [facility] [address]   [phone]            Dec 29, 2017 11:25 AM CST   (Arrive by 11:10 AM)   Return Visit with Racheal Swift MD   [facility] ([facility])    [address] [facility]  4th Floor  [facility] [id number] [phone]            Jun 20, 2018 10:30 AM CDT   RETURN RETINA with Jen Aranda MD   [facility] ([facility])    [address]  [address] [facility]  9th [address]  [facility] [id number] [phone]              Who to contact     If you have questions or need follow up information about today's clinic visit or your schedule please contact [facility] directly at [phone].  Normal or non-critical lab and imaging results will be communicated to you by MyChart, letter or phone within 4 business days after the clinic has received the results. If you do not hear from us within 7 days, please contact the clinic through Vimblyhart or phone. If you have a critical or abnormal lab result, we will notify you by phone as soon as possible.  Submit refill requests through [employer] or call your pharmacy and they will forward the refill request to us. Please allow 3 business days for your refill to be completed.          Additional Information About Your Visit        [employer] Information     [employer] gives you secure access to your electronic health record. If you see a primary care provider, you can also send messages to your care team and make appointments. If you have questions, please call your primary care clinic.  If you do not have a primary care provider, please call [phone] and  they will assist you.      [employer] is an electronic gateway that provides easy, online access to your medical records. With [employer], you can request a clinic appointment, read your test results, renew a prescription or communicate with your care team.     To access your existing account, please contact your [facility] or call [phone] for assistance.        Care EveryWhere ID     This is your Care EveryWhere ID. This could be used by other organizations to access your [facility] medical records  [id number]        Your Vitals Were     Pulse Pulse Oximetry                102 98%           Blood Pressure from Last 3 Encounters:   11/21/17 156/77   11/17/17 138/60   11/14/17 138/64    Weight from Last 3 Encounters:   10/19/17 83.5 kg (184 lb)   10/10/17 81.2 kg (179 lb)   09/11/17 77.7 kg (171 lb 6.4 oz)              We Performed the Following     CRP inflammation     DEBRIDEMENT WOUND UP TO 20 SQ CM     Erythrocyte sedimentation rate auto        Primary Care Provider Office Phone # Fax #    BranfordDionne Swift [phone]       [address] [place] [address] [address] [place] [address]        Equal Access to Services     [facility] : Hadii aad ku hadasho Somaameali, waaxda luqadaha, qaybta kaalmada adeegyada, yolanda mann zane duran . So [facility] [phone].    ATENCIÓN: Si habla español, tiene a rodrigues disposición servicios gratuitos de asistencia lingüística. Llame al [phone].    We comply with applicable federal civil rights laws and [place] laws. We do not discriminate on the basis of race, color, national origin, age, disability, sex, sexual orientation, or gender identity.            Thank you!     Thank you for choosing [facility]  for your care. Our goal is always to provide you with excellent care. Hearing back from our patients is one way we can continue to improve our services. Please take a few minutes to complete the written survey  that you may receive in the mail after your visit with us. Thank you!             Your Updated Medication List - Protect others around you: Learn how to safely use, store and throw away your medicines at www.disposemymeds.org.          This list is accurate as of: 11/21/17 10:13 AM.  Always use your most recent med list.                   Brand Name Dispense Instructions for use Diagnosis    acetaminophen 500 MG tablet    TYLENOL     Take 2 tablets (1,000 mg) by mouth 3 times daily    Closed fracture of neck of right femur, initial encounter (H)       ammonium lactate 12 % cream    LAC-HYDRIN    385 g    Apply topically 2 times daily as needed for dry skin    Venous stasis, Type 2 diabetes, controlled, with neuropathy (H)       ascorbic acid 500 MG Tabs     30 tablet    Take 1 tablet (500 mg) by mouth 2 times daily    Ulcer of right lower leg, with fat layer exposed (H)       blood glucose monitoring lancets     3 Box    Use to test blood sugars 2 as directed.    Type 2 diabetes, uncontrolled, with neuropathy (H)       blood glucose monitoring test strip    ONETOUCH ULTRA    60 strip    Use to test blood sugars 2 times daily or as directed.    Type 2 diabetes mellitus (H)       Cholecalciferol 3000 UNITS Tabs     30 tablet    Take 3,000 Units by mouth daily    Closed fracture of neck of right femur, initial encounter (H)       clindamycin 300 MG capsule    CLEOCIN    30 capsule    Take 1 capsule (300 mg) by mouth 3 times daily    Ulcer of leg, chronic, right, with fat layer exposed (H), Skin ulcer of right foot with fat layer exposed (H), Type II or unspecified type diabetes mellitus with neurological manifestations, not stated as uncontrolled(250.60) (H)       clopidogrel 75 MG tablet    PLAVIX    30 tablet    Take 1 tablet (75 mg) by mouth daily    Peripheral vascular disease, unspecified       COLACE PO           ferrous sulfate 325 (65 FE) MG tablet    IRON    60 tablet    Take 1 tablet (325 mg) by mouth 2 times  "daily    Peripheral vascular disease, unspecified       gentamicin 0.1 % cream    GARAMYCIN    30 g    Apply topically daily To right leg ulcer.    Ulcer of right lower leg, with fat layer exposed (H), Chronic venous hypertension with ulcer involving right side (H), Type 2 diabetes, controlled, with neuropathy (H)       hydrocortisone 0.2 % cream    WESTCORT    15 g    Apply sparingly to affected area twice times daily for 14 days.    Dermatitis       insulin glargine 100 UNIT/ML injection    LANTUS     Inject 28 Units Subcutaneous every morning        insulin pen needle 31G X 8 MM    B-D U/F    100 each    Use 1 daily o as directed    Diabetes mellitus, type II (H)       levofloxacin 750 MG tablet    LEVAQUIN    14 tablet    Take 1 tablet (750 mg) by mouth daily    Ulcer of leg, chronic, right, with fat layer exposed (H), Skin ulcer of right foot with fat layer exposed (H), Type II or unspecified type diabetes mellitus with neurological manifestations, not stated as uncontrolled(250.60) (H)       LISINOPRIL PO      Take 20 mg by mouth 2 times daily        methocarbamol 750 MG tablet    ROBAXIN    45 tablet    Take 1 tablet (750 mg) by mouth 4 times daily    Closed fracture of neck of right femur, initial encounter (H)       nateglinide 120 MG tablet    STARLIX    90 tablet    TAKE 1 TABLET BY MOUTH THREE TIMES DAILY BEFORE MEALS    Type 2 diabetes, controlled, with neuropathy (H)       OPTIFOAM 6\"X6\" Pads     1 each    1 Box once a week    Ulcer of right leg, with fat layer exposed (H)       * order for DME     2 each    Please measure and distribute 1 pair of 20mm Hg - 30mm Hg knee high ULCER CARE open or closed toe compression stockings.  Patient has a size 13 foot and please take this into consideration.  Jobst or equivalent    Varicose veins of lower extremities with other complications, Venous stasis ulcer of right lower extremity (H)       * order for DME     3 each    Please measure and distribute 1 pair of " 20mmHg - 30mmHg knee high open or closed toe compression stockings. Jobst ultrasheer or equivalent.    Varicose veins of both lower extremities with complications       * order for DME     2 each    Please measure and distribute 1 pair of 30mmHg - 40mmHg knee high open toe ulcercare compression stockings. Jobst ultrasheer or equivalent.    Varicose veins of bilateral lower extremities with other complications       oxyCODONE IR 5 MG tablet    ROXICODONE     Take 1-2 tablets (5-10 mg) by mouth every 3 hours as needed for moderate to severe pain And scheduled Oxycodone 10 mg at 0800 and 1300 prior to therapies    Closed fracture of neck of right femur, initial encounter (H)       senna-docusate 8.6-50 MG per tablet    SENOKOT-S;PERICOLACE    100 tablet    Take 2 tablets by mouth 2 times daily as needed for constipation    Constipation, unspecified constipation type       sildenafil 50 MG tablet    VIAGRA    10 tablet    Take 1 tablet (50 mg) by mouth daily as needed for erectile dysfunction    Vasculogenic erectile dysfunction, unspecified vasculogenic erectile dysfunction type       * silver sulfADIAZINE 1 % cream    SILVADENE    85 g    Apply topically daily To affected areas on right foot and leg.    Ulcer of right lower leg, with fat layer exposed (H), Chronic venous hypertension with ulcer involving right side (H), Type 2 diabetes, controlled, with neuropathy (H)       * SILVADENE 1 % cream   Generic drug:  silver sulfADIAZINE     20 g    Apply topically daily Dispensed at visit for heel and 5th mt ulcers.        simvastatin 10 MG tablet    ZOCOR    90 tablet    Take 1 tablet (10 mg) by mouth At Bedtime    Type 2 diabetes, controlled, with neuropathy (H)       sitagliptin 100 MG tablet    JANUVIA    90 tablet    Take 1 tablet (100 mg) by mouth daily    Type 2 diabetes, controlled, with neuropathy (H)       triamcinolone 0.1 % cream    KENALOG    30 g    Apply sparingly to left heel daily.    Dermatitis of left  foot       * Notice:  This list has 5 medication(s) that are the same as other medications prescribed for you. Read the directions carefully, and ask your doctor or other care provider to review them with you.

## 2017-11-21 NOTE — PATIENT INSTRUCTIONS
Thanks for coming today.  Ortho/Sports Medicine Clinic  [address] [place] [address]    To schedule future appointments in Ortho Clinic, you may call [phone].    To schedule ordered imaging by your Provider: Call [facility] at [phone]    Telderi available online at:   [email or web address]    Please call if any further questions or concerns [phone] and ask for the Orthopedic Department. Clinic hours 8 am to 5 pm.    Return to clinic if symptoms worsen.

## 2017-11-21 NOTE — NURSING NOTE
"Amos Walker's goals for this visit include: Recheck leg wound  He requests these members of his care team be copied on today's visit information: yes    PCP: Racheal Swift    Referring Provider:  Referred Self, MD  No address on file    Chief Complaint   Patient presents with     RECHECK     Recheck leg wound       Initial /77  Pulse 102  SpO2 98% Estimated body mass index is 22.55 kg/(m^2) as calculated from the following:    Height as of 10/19/17: 1.924 m (6' 3.75\").    Weight as of 10/19/17: 83.5 kg (184 lb).  Medication Reconciliation: complete    "
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
## 2017-11-24 ENCOUNTER — TELEPHONE (OUTPATIENT)
Dept: INTERVENTIONAL RADIOLOGY/VASCULAR | Facility: CLINIC | Age: 72
End: 2017-11-24

## 2017-11-24 DIAGNOSIS — I70.213 ATHEROSCLEROSIS OF NATIVE ARTERY OF BOTH LOWER EXTREMITIES WITH INTERMITTENT CLAUDICATION (H): ICD-10-CM

## 2017-11-24 DIAGNOSIS — I83.893 SYMPTOMATIC VARICOSE VEINS OF BOTH LOWER EXTREMITIES: Primary | ICD-10-CM

## 2017-11-27 NOTE — TELEPHONE ENCOUNTER
I called and spoke with patients wife Danielle, pt is scheduled for imaging prior to seeing Dr. Jenkins in clinic this week, she confirms the appts.  HENRIETTA Dorantes, RN, BSN  Interventional Radiology Care Coordinator   Phone:  [phone]

## 2017-11-28 ENCOUNTER — OFFICE VISIT (OUTPATIENT)
Dept: PODIATRY | Facility: CLINIC | Age: 72
End: 2017-11-28
Payer: MEDICARE

## 2017-11-28 VITALS — DIASTOLIC BLOOD PRESSURE: 55 MMHG | HEART RATE: 92 BPM | OXYGEN SATURATION: 98 % | SYSTOLIC BLOOD PRESSURE: 132 MMHG

## 2017-11-28 DIAGNOSIS — L97.512 SKIN ULCER OF RIGHT FOOT WITH FAT LAYER EXPOSED (H): ICD-10-CM

## 2017-11-28 DIAGNOSIS — I87.8 VENOUS STASIS: ICD-10-CM

## 2017-11-28 DIAGNOSIS — E11.49 TYPE II OR UNSPECIFIED TYPE DIABETES MELLITUS WITH NEUROLOGICAL MANIFESTATIONS, NOT STATED AS UNCONTROLLED(250.60) (H): ICD-10-CM

## 2017-11-28 DIAGNOSIS — L97.912 ULCER OF LEG, CHRONIC, RIGHT, WITH FAT LAYER EXPOSED (H): Primary | ICD-10-CM

## 2017-11-28 PROCEDURE — 99213 OFFICE O/P EST LOW 20 MIN: CPT | Performed by: PODIATRIST
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
## 2017-11-28 NOTE — NURSING NOTE
"Amos Walker's goals for this visit include: Recheck leg wound  He requests these members of his care team be copied on today's visit information: yes    PCP: Racheal Swift    Referring Provider:  Referred Self, MD  No address on file    Chief Complaint   Patient presents with     RECHECK     recheck leg       Initial /55  Pulse 92  SpO2 98% Estimated body mass index is 22.55 kg/(m^2) as calculated from the following:    Height as of 10/19/17: 1.924 m (6' 3.75\").    Weight as of 10/19/17: 83.5 kg (184 lb).  Medication Reconciliation: complete    "

## 2017-11-28 NOTE — MR AVS SNAPSHOT
After Visit Summary   11/28/2017    Amos Walker    MRN: [id number]           Patient Information     Date Of Birth          1945        Visit Information        Provider Department      11/28/2017 10:00 AM Brayan Mcclain DPM [facility]        Today's Diagnoses     Ulcer of leg, chronic, right, with fat layer exposed (H)    -  1    Skin ulcer of right foot with fat layer exposed (H)        Venous stasis        Type II or unspecified type diabetes mellitus with neurological manifestations, not stated as uncontrolled(250.60) (H)          Care Instructions    Thanks for coming today.  Ortho/Sports Medicine Clinic  [address] [place] [address]    To schedule future appointments in Ortho Clinic, you may call [phone].    To schedule ordered imaging by your Provider: Call [facility] at [phone]    [facility] available online at:   [email or web address]    Please call if any further questions or concerns [phone] and ask for the Orthopedic Department. Clinic hours 8 am to 5 pm.    Return to clinic if symptoms worsen.            Follow-ups after your visit        Your next 10 appointments already scheduled     Dec 05, 2017 10:00 AM CST   Return Visit with Brayan Mcclain DPM   [facility])    [address] [place] [id number] [phone]            Dec 12, 2017 10:00 AM CST   Return Visit with Brayan Mcclain DPM   [facility] ([facility])    [address] [facility] [address] [phone]            Dec 19, 2017 10:00 AM CST   Return Visit with Brayan Mcclain DPM   [facility] ([facility])    [address] [facility] [id number] [phone]            Dec 29, 2017 11:25 AM CST   (Arrive by 11:10 AM)   Return Visit with Racheal Swift MD   [facility] ([facility])    [address]  4th Floor  [facility] [address]   [phone]            Jun 20, 2018 10:30 AM CDT   RETURN RETINA with Jen Aranda MD   [facility] ([facility])    [address]  [address]  9th [address]  [facility] [address]   [phone]              Future tests that were ordered for you today     Open Future Orders        Priority Expected Expires Ordered    IR Lower Extremity Angiogram Right Routine  12/1/2018 12/1/2017            Who to contact     If you have questions or need follow up information about today's clinic visit or your schedule please contact [facility] directly at [phone].  Normal or non-critical lab and imaging results will be communicated to you by [employer], letter or phone within 4 business days after the clinic has received the results. If you do not hear from us within 7 days, please contact the clinic through [employer] or phone. If you have a critical or abnormal lab result, we will notify you by phone as soon as possible.  Submit refill requests through [employer] or call your pharmacy and they will forward the refill request to us. Please allow 3 business days for your refill to be completed.          Additional Information About Your Visit        [employer] Information     [employer] gives you secure access to your electronic health record. If you see a primary care provider, you can also send messages to your care team and make appointments. If you have questions, please call your primary care clinic.  If you do not have a primary care provider, please call [phone] and they will assist you.      [employer] is an electronic gateway that provides easy, online access to your medical records. With [employer], you can request a clinic appointment, read your test results, renew a prescription or communicate with your care team.     To access your existing account, please contact your  [facility] or call [phone] for assistance.        Care EveryWhere ID     This is your Care EveryWhere ID. This could be used by other organizations to access your [facility] medical records  [id number]        Your Vitals Were     Pulse Pulse Oximetry                92 98%           Blood Pressure from Last 3 Encounters:   11/30/17 136/64   11/28/17 132/55   11/21/17 156/77    Weight from Last 3 Encounters:   10/19/17 83.5 kg (184 lb)   10/10/17 81.2 kg (179 lb)   09/11/17 77.7 kg (171 lb 6.4 oz)              Today, you had the following     No orders found for display       Primary Care Provider Office Phone # Fax #    Racheal Swift [phone]       [address] [place] [address]        Equal Access to Services     [facility] E[facility]NA [facility] : Hadii niurka ku hadasho Somaameali, waaxda luqadaha, qaybta kaalmada adeegyada, waxay geniin hayhollie duran . So [facility] [phone].    ATENCIÓN: Si habla español, tiene a rodrigues disposición servicios gratuitos de asistencia lingüística. Llame al [phone].    We comply with applicable federal civil rights laws and [place] laws. We do not discriminate on the basis of race, color, national origin, age, disability, sex, sexual orientation, or gender identity.            Thank you!     Thank you for choosing [facility]  for your care. Our goal is always to provide you with excellent care. Hearing back from our patients is one way we can continue to improve our services. Please take a few minutes to complete the written survey that you may receive in the mail after your visit with us. Thank you!             Your Updated Medication List - Protect others around you: Learn how to safely use, store and throw away your medicines at www.disposemymeds.org.          This list is accurate as of: 11/28/17 11:59 PM.  Always use your most recent med list.                   Brand Name Dispense Instructions  for use Diagnosis    acetaminophen 500 MG tablet    TYLENOL     Take 2 tablets (1,000 mg) by mouth 3 times daily    Closed fracture of neck of right femur, initial encounter (H)       ammonium lactate 12 % cream    LAC-HYDRIN    385 g    Apply topically 2 times daily as needed for dry skin    Venous stasis, Type 2 diabetes, controlled, with neuropathy (H)       ascorbic acid 500 MG Tabs     30 tablet    Take 1 tablet (500 mg) by mouth 2 times daily    Ulcer of right lower leg, with fat layer exposed (H)       blood glucose monitoring lancets     3 Box    Use to test blood sugars 2 as directed.    Type 2 diabetes, uncontrolled, with neuropathy (H)       blood glucose monitoring test strip    ONETOUCH ULTRA    60 strip    Use to test blood sugars 2 times daily or as directed.    Type 2 diabetes mellitus (H)       Cholecalciferol 3000 UNITS Tabs     30 tablet    Take 3,000 Units by mouth daily    Closed fracture of neck of right femur, initial encounter (H)       clindamycin 300 MG capsule    CLEOCIN    30 capsule    Take 1 capsule (300 mg) by mouth 3 times daily    Ulcer of leg, chronic, right, with fat layer exposed (H), Skin ulcer of right foot with fat layer exposed (H), Type II or unspecified type diabetes mellitus with neurological manifestations, not stated as uncontrolled(250.60) (H)       clopidogrel 75 MG tablet    PLAVIX    30 tablet    Take 1 tablet (75 mg) by mouth daily    Peripheral vascular disease, unspecified       COLACE PO           ferrous sulfate 325 (65 FE) MG tablet    IRON    60 tablet    Take 1 tablet (325 mg) by mouth 2 times daily    Peripheral vascular disease, unspecified       gentamicin 0.1 % cream    GARAMYCIN    30 g    Apply topically daily To right leg ulcer.    Ulcer of right lower leg, with fat layer exposed (H), Chronic venous hypertension with ulcer involving right side (H), Type 2 diabetes, controlled, with neuropathy (H)       hydrocortisone 0.2 % cream    WESTCORT    15 g     "Apply sparingly to affected area twice times daily for 14 days.    Dermatitis       insulin glargine 100 UNIT/ML injection    LANTUS     Inject 28 Units Subcutaneous every morning        insulin pen needle 31G X 8 MM    B-D U/F    100 each    Use 1 daily o as directed    Diabetes mellitus, type II (H)       levofloxacin 750 MG tablet    LEVAQUIN    14 tablet    Take 1 tablet (750 mg) by mouth daily    Ulcer of leg, chronic, right, with fat layer exposed (H), Skin ulcer of right foot with fat layer exposed (H), Type II or unspecified type diabetes mellitus with neurological manifestations, not stated as uncontrolled(250.60) (H)       LISINOPRIL PO      Take 20 mg by mouth 2 times daily        methocarbamol 750 MG tablet    ROBAXIN    45 tablet    Take 1 tablet (750 mg) by mouth 4 times daily    Closed fracture of neck of right femur, initial encounter (H)       nateglinide 120 MG tablet    STARLIX    90 tablet    TAKE 1 TABLET BY MOUTH THREE TIMES DAILY BEFORE MEALS    Type 2 diabetes, controlled, with neuropathy (H)       OPTIFOAM 6\"X6\" Pads     1 each    1 Box once a week    Ulcer of right leg, with fat layer exposed (H)       * order for DME     2 each    Please measure and distribute 1 pair of 20mm Hg - 30mm Hg knee high ULCER CARE open or closed toe compression stockings.  Patient has a size 13 foot and please take this into consideration.  Jobst or equivalent    Varicose veins of lower extremities with other complications, Venous stasis ulcer of right lower extremity (H)       * order for DME     3 each    Please measure and distribute 1 pair of 20mmHg - 30mmHg knee high open or closed toe compression stockings. Jobst ultrasheer or equivalent.    Varicose veins of both lower extremities with complications       * order for DME     2 each    Please measure and distribute 1 pair of 30mmHg - 40mmHg knee high open toe ulcercare compression stockings. Jobst ultrasheer or equivalent.    Varicose veins of bilateral " lower extremities with other complications       oxyCODONE IR 5 MG tablet    ROXICODONE     Take 1-2 tablets (5-10 mg) by mouth every 3 hours as needed for moderate to severe pain And scheduled Oxycodone 10 mg at 0800 and 1300 prior to therapies    Closed fracture of neck of right femur, initial encounter (H)       senna-docusate 8.6-50 MG per tablet    SENOKOT-S;PERICOLACE    100 tablet    Take 2 tablets by mouth 2 times daily as needed for constipation    Constipation, unspecified constipation type       sildenafil 50 MG tablet    VIAGRA    10 tablet    Take 1 tablet (50 mg) by mouth daily as needed for erectile dysfunction    Vasculogenic erectile dysfunction, unspecified vasculogenic erectile dysfunction type       * silver sulfADIAZINE 1 % cream    SILVADENE    85 g    Apply topically daily To affected areas on right foot and leg.    Ulcer of right lower leg, with fat layer exposed (H), Chronic venous hypertension with ulcer involving right side (H), Type 2 diabetes, controlled, with neuropathy (H)       * SILVADENE 1 % cream   Generic drug:  silver sulfADIAZINE     20 g    Apply topically daily Dispensed at visit for heel and 5th mt ulcers.        simvastatin 10 MG tablet    ZOCOR    90 tablet    Take 1 tablet (10 mg) by mouth At Bedtime    Type 2 diabetes, controlled, with neuropathy (H)       sitagliptin 100 MG tablet    JANUVIA    90 tablet    Take 1 tablet (100 mg) by mouth daily    Type 2 diabetes, controlled, with neuropathy (H)       triamcinolone 0.1 % cream    KENALOG    30 g    Apply sparingly to left heel daily.    Dermatitis of left foot       * Notice:  This list has 5 medication(s) that are the same as other medications prescribed for you. Read the directions carefully, and ask your doctor or other care provider to review them with you.

## 2017-11-28 NOTE — PATIENT INSTRUCTIONS
Thanks for coming today.  Ortho/Sports Medicine Clinic  [address] [place] [address]    To schedule future appointments in Ortho Clinic, you may call [phone].    To schedule ordered imaging by your Provider: Call [facility] at [phone]    [employer] available online at:   [email or web address]    Please call if any further questions or concerns [phone] and ask for the Orthopedic Department. Clinic hours 8 am to 5 pm.    Return to clinic if symptoms worsen.

## 2017-11-30 ENCOUNTER — OFFICE VISIT (OUTPATIENT)
Dept: RADIOLOGY | Facility: CLINIC | Age: 72
End: 2017-11-30

## 2017-11-30 VITALS
RESPIRATION RATE: 16 BRPM | HEART RATE: 96 BPM | DIASTOLIC BLOOD PRESSURE: 64 MMHG | SYSTOLIC BLOOD PRESSURE: 136 MMHG | OXYGEN SATURATION: 96 %

## 2017-11-30 DIAGNOSIS — I70.213 ATHEROSCLEROSIS OF NATIVE ARTERY OF BOTH LOWER EXTREMITIES WITH INTERMITTENT CLAUDICATION (H): Primary | ICD-10-CM

## 2017-11-30 ASSESSMENT — PAIN SCALES - GENERAL: PAINLEVEL: NO PAIN (0)

## 2017-11-30 NOTE — NURSING NOTE
Chief Complaint   Patient presents with     RECHECK     Follow up venous insufficiency        Vitals:    11/30/17 1151   BP: 136/64   BP Location: Left arm   Pulse: 96   Resp: 16   SpO2: 96%       There is no height or weight on file to calculate BMI.           Trish Fernandez LPN
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
## 2017-12-01 ENCOUNTER — MEDICAL CORRESPONDENCE (OUTPATIENT)
Dept: HEALTH INFORMATION MANAGEMENT | Facility: CLINIC | Age: 72
End: 2017-12-01

## 2017-12-01 ENCOUNTER — TELEPHONE (OUTPATIENT)
Dept: INTERVENTIONAL RADIOLOGY/VASCULAR | Facility: CLINIC | Age: 72
End: 2017-12-01

## 2017-12-01 NOTE — TELEPHONE ENCOUNTER
I called and left a message with patients wife, pt is sleeping still.  I left 12/19 or after first of the year as treatment date options.  I left my call back information and will schedule pt for RLE angio once he calls back with transportation availability.  HENRIETTA Dorantes RN, BSN  Interventional Radiology Care Coordinator   Phone:  [phone]

## 2017-12-01 NOTE — LETTER
December 4, 2017    Amos Walker  [address]  [place] [address]    Dear Andrew Trinidad lower extremity angiogram has been scheduled for Tuesday, December 19th @ 9 am with Dr. Jenkins. Please check-in to the gold waiting room at the [facility] at 7:30 am. Located at [address] [place] [address], [facility].    *Please have nothing to eat for 6 hours prior to the procedure time    *You may have clear liquids (black coffee, water, broth) up to 2 hours prior to the procedure time    *Please HOLD your diabetes medications day of the procedure as you will have had nothing to eat for 6 hours prior to procedure.  *You will need a ride home after the procedure  *Plan to spend an estimated 8 hours in the hospital on the day of your procedure    Please feel free to call me with any questions or concerns as your procedure approaches.    Elba Dorantes RN, BSN  Interventional Radiology Care Coordinator  Office: [phone]

## 2017-12-03 DIAGNOSIS — Z79.4 TYPE 2 DIABETES MELLITUS WITH DIABETIC PERIPHERAL ANGIOPATHY WITHOUT GANGRENE, WITH LONG-TERM CURRENT USE OF INSULIN (H): Primary | ICD-10-CM

## 2017-12-03 DIAGNOSIS — E11.51 TYPE 2 DIABETES MELLITUS WITH DIABETIC PERIPHERAL ANGIOPATHY WITHOUT GANGRENE, WITH LONG-TERM CURRENT USE OF INSULIN (H): Primary | ICD-10-CM

## 2017-12-04 NOTE — TELEPHONE ENCOUNTER
Last Written Prescription Date:  UNK  Last Fill Quantity: UNK,   # refills: UNK  Last Office Visit : 10/19/17  Future Office visit:  12/29/17    Routing refill request to provider for review/approval because:  Medication is reported/historical

## 2017-12-05 ENCOUNTER — OFFICE VISIT (OUTPATIENT)
Dept: PODIATRY | Facility: CLINIC | Age: 72
End: 2017-12-05
Payer: MEDICARE

## 2017-12-05 VITALS — HEART RATE: 107 BPM | SYSTOLIC BLOOD PRESSURE: 148 MMHG | OXYGEN SATURATION: 100 % | DIASTOLIC BLOOD PRESSURE: 73 MMHG

## 2017-12-05 DIAGNOSIS — L97.512 SKIN ULCER OF RIGHT FOOT WITH FAT LAYER EXPOSED (H): Primary | ICD-10-CM

## 2017-12-05 DIAGNOSIS — L97.912 ULCER OF RIGHT LOWER EXTREMITY WITH FAT LAYER EXPOSED (H): ICD-10-CM

## 2017-12-05 DIAGNOSIS — E11.51 DIABETES MELLITUS WITH PERIPHERAL VASCULAR DISEASE (H): ICD-10-CM

## 2017-12-05 DIAGNOSIS — E11.49 TYPE II OR UNSPECIFIED TYPE DIABETES MELLITUS WITH NEUROLOGICAL MANIFESTATIONS, NOT STATED AS UNCONTROLLED(250.60) (H): ICD-10-CM

## 2017-12-05 PROCEDURE — 97597 DBRDMT OPN WND 1ST 20 CM/<: CPT | Performed by: PODIATRIST

## 2017-12-05 NOTE — PATIENT INSTRUCTIONS
Thanks for coming today.  Ortho/Sports Medicine Clinic  [address] [place] [address]    To schedule future appointments in Ortho Clinic, you may call [phone].    To schedule ordered imaging by your Provider: Call [facility] at [phone]    Democravise available online at:   [email or web address]    Please call if any further questions or concerns [phone] and ask for the Orthopedic Department. Clinic hours 8 am to 5 pm.    Return to clinic if symptoms worsen.

## 2017-12-05 NOTE — TELEPHONE ENCOUNTER
Resent [place] of care form 12/05. I had gotten on 11/22 a fax saying they received it and would let us know within 48 hours what the benefits are. Didn't hear back so I resent it again.-SP

## 2017-12-05 NOTE — TELEPHONE ENCOUNTER
" PA approved [employer] CPT code: ,  67992, [address] ICD10 code: L97.512, E11.49, E11.51 Patient has a $300 deductible which has been met, and a 20% co-insurnace of maximum out of pocket $1, 750.00 and $1,064.59 has been met. Once out of pocket is met patient is covered at 100%. Call ref. # [id number] \"[employer]\" patient is covered for up to 5 applications for the life of the wound. 12/05/17      Amos Santos's wife answered the phone and I was able to talk with her about this covered for [employer],she is good with paying for their portion. If they need to come in sooner to get this applied let them know otherwise they will be in next Tuesday      "

## 2017-12-05 NOTE — NURSING NOTE
"Amos Walker's goals for this visit include: Wound check   He requests these members of his care team be copied on today's visit information: yes    PCP: Racheal Swift    Referring Provider:  Referred Self, MD  No address on file    Chief Complaint   Patient presents with     RECHECK     wound check        Initial /73  Pulse 107  SpO2 100% Estimated body mass index is 22.55 kg/(m^2) as calculated from the following:    Height as of 10/19/17: 1.924 m (6' 3.75\").    Weight as of 10/19/17: 83.5 kg (184 lb).  Medication Reconciliation: complete    "

## 2017-12-05 NOTE — MR AVS SNAPSHOT
After Visit Summary   12/5/2017    Amos Walker    MRN: [id number]           Patient Information     Date Of Birth          1945        Visit Information        Provider Department      12/5/2017 10:00 AM Brayan Mcclain DPM [facility]        Today's Diagnoses     Skin ulcer of right foot with fat layer exposed (H)    -  1    Ulcer of right lower extremity with fat layer exposed (H)        Type II or unspecified type diabetes mellitus with neurological manifestations, not stated as uncontrolled(250.60) (H)        Diabetes mellitus with peripheral vascular disease (H)          Care Instructions    Thanks for coming today.  [facility]  [address] [place] [address]    To schedule future appointments in Ortho Clinic, you may call [phone].    To schedule ordered imaging by your Provider: Call [facility] at [phone]    European Batteries available online at:   [email or web address]    Please call if any further questions or concerns [phone] and ask for the Orthopedic Department. Clinic hours 8 am to 5 pm.    Return to clinic if symptoms worsen.            Follow-ups after your visit        Your next 10 appointments already scheduled     Dec 12, 2017 10:00 AM CST   [facility]    [address] [place] [address] [phone]            Dec 19, 2017  7:30 AM CST   Procedure 6.5 hour with U2A ROOM 3   Unit 2A [facility] ([facility])    [address] [address]               Dec 19, 2017  9:00 AM CST   (Arrive by 8:45 AM)   IR LOWER EXTREMITY ANGIOGRAM RIGHT with UUIR5   [facility], Interventional Radiology ([facility])    [address] [address] [phone]           1. Your doctor will need  to do a history and physical within 30 days before this procedure. 2. Your doctor will determine whether you need a 12 lead EKG, as well as which medications should not be taken the morning of the exam. 3. Laboratory tests are to be obtained by your doctor prior to the exam (creatinine, Hgb/Hct, platelet count, and INR) 4. If you have allergies to x-ray contrast or iodine, contact your doctor or a Radiology nurse prior to the exam day for instructions. 5. Someone will need to drive you to and from the hospital. 6. Bring a list of all drugs you are taking; include supplements and over-the-counter medications. 7. Wear comfortable clothes and leave your valuables at home. 8. If you are or may be pregnant, contact your doctor or a Radiology nurse prior to the day of the exam. 9.  If you have diabetes, check with your doctor or a Radiology nurse to see if your insulin needs to be adjusted for the exam. 10. If you are taking Coumadin (to thin you blood) please contact your doctor or a Radiology nurse at least 5 days before the exam for special instructions. 11. You should not have received barium (x-ray contrast) within 48 hours of this exam. 12. The day before your exam you may eat your regular diet and are encouraged to drink at least 2 quarts of clear liquids. Drink no alcoholic beverages for 24 hours prior to the exam. 13. If you have a colostomy you will need to irrigate it with tap water at 8PM the evening before and again at 6AM the morning of the exam. 14. Do not smoke for 24 hours prior to the procedure. 15. Do not eat any solid food or milk products for 6 hours prior to the exam. You may drink clear liquids until 2 hours prior to the exam. Clear liquids include the following: water, Jell-O, clear broth, apple juice or any non-carbonated drink that you can see through (no pop!) 16. The morning of the exam you may brush your teeth and take medications as directed with a sip of water. 17. Tell the Radiology nurse  if you have any allergies. 18. You will be asked to empty your bladder before the exam begins. 19. Following the exam you will need to remain on complete bedrest for 4-6 hours. The nurse will monitor your vital signs, puncture site, and the pulses and temperature of the arm or leg that was punctured. 20. When discharged, you cannot drive until morning, and an adult must be with you until then. You should stay in the [place] area overnight.            Dec 29, 2017 11:25 AM CST   (Arrive by 11:10 AM)   Return Visit with Racheal Swift MD   [facility] ([facility])    [address]  4th Floor  [facility] [address]   [phone]            Jan 02, 2018 10:00 AM CST   Return Visit with Brayan Mcclain DPM   [facility])    [address] [facility] [address]   [phone]            Jan 09, 2018 10:00 AM CST   Return Visit with Brayan Mcclain DPM   [facility])    [address] [facility] [address]   [phone]            Jan 16, 2018 10:30 AM CST   Return Visit with Brayan Mcclain DPM   [facility])    [address] [facility] [address]   [phone]            Jan 23, 2018 10:00 AM CST   Return Visit with Brayan Mcclain DPM   [facility])    [address] [facility] [address]   [phone]            Jan 30, 2018 10:00 AM CST   Return Visit with Brayan Mcclain DPM   [facility])    [address] [facility] [address]   [phone]            Feb 06, 2018  9:30 AM CST   Return Visit with Brayan Mcclain DPM   [facility])    [address] [facility] [address]   [phone]              Who to  contact     If you have questions or need follow up information about today's clinic visit or your schedule please contact [facility] directly at [phone].  Normal or non-critical lab and imaging results will be communicated to you by Lollipuffhart, letter or phone within 4 business days after the clinic has received the results. If you do not hear from us within 7 days, please contact the clinic through Lollipuffhart or phone. If you have a critical or abnormal lab result, we will notify you by phone as soon as possible.  Submit refill requests through [employer]engi or call your pharmacy and they will forward the refill request to us. Please allow 3 business days for your refill to be completed.          Additional Information About Your Visit        [employer] Information     [employer] gives you secure access to your electronic health record. If you see a primary care provider, you can also send messages to your care team and make appointments. If you have questions, please call your primary care clinic.  If you do not have a primary care provider, please call [phone] and they will assist you.      [employer] Dengi is an electronic gateway that provides easy, online access to your medical records. With [employer] Dengi, you can request a clinic appointment, read your test results, renew a prescription or communicate with your care team.     To access your existing account, please contact your [facility] or call [phone] for assistance.        Care EveryWhere ID     This is your Care EveryWhere ID. This could be used by other organizations to access your [facility] medical records  [id number]        Your Vitals Were     Pulse Pulse Oximetry                107 100%           Blood Pressure from Last 3 Encounters:   12/05/17 148/73   11/30/17 136/64   11/28/17 132/55    Weight from Last 3 Encounters:   10/19/17 83.5 kg (184 lb)   10/10/17 81.2 kg (179 lb)   09/11/17 77.7 kg (171 lb 6.4 oz)               We Performed the Following     DEBRIDEMENT WOUND UP TO 20 SQ CM        Primary Care Provider Office Phone # Fax #    Racheal Swift [phone]       [address] [place] [address]        Equal Access to Services     SAMSON MELTON : Hadparas niurka webb danao Somaameali, waaxda luqadaha, qaybta kaalmada adeegyada, yolanda geniin hayaakristie cabellodamion landisromulo lopez. So [facility] [phone].    ATENCIÓN: Si habla español, tiene a rodrigues disposición servicios gratuitos de asistencia lingüística. Llame al [phone].    We comply with applicable federal civil rights laws and [place] laws. We do not discriminate on the basis of race, color, national origin, age, disability, sex, sexual orientation, or gender identity.            Thank you!     Thank you for choosing [facility]  for your care. Our goal is always to provide you with excellent care. Hearing back from our patients is one way we can continue to improve our services. Please take a few minutes to complete the written survey that you may receive in the mail after your visit with us. Thank you!             Your Updated Medication List - Protect others around you: Learn how to safely use, store and throw away your medicines at www.disposemymeds.org.          This list is accurate as of: 12/5/17 10:48 AM.  Always use your most recent med list.                   Brand Name Dispense Instructions for use Diagnosis    acetaminophen 500 MG tablet    TYLENOL     Take 2 tablets (1,000 mg) by mouth 3 times daily    Closed fracture of neck of right femur, initial encounter (H)       ammonium lactate 12 % cream    LAC-HYDRIN    385 g    Apply topically 2 times daily as needed for dry skin    Venous stasis, Type 2 diabetes, controlled, with neuropathy (H)       ascorbic acid 500 MG Tabs     30 tablet    Take 1 tablet (500 mg) by mouth 2 times daily    Ulcer of right lower leg, with fat layer exposed (H)       blood glucose  monitoring lancets     3 Box    Use to test blood sugars 2 as directed.    Type 2 diabetes, uncontrolled, with neuropathy (H)       blood glucose monitoring test strip    ONETOUCH ULTRA    60 strip    Use to test blood sugars 2 times daily or as directed.    Type 2 diabetes mellitus (H)       Cholecalciferol 3000 UNITS Tabs     30 tablet    Take 3,000 Units by mouth daily    Closed fracture of neck of right femur, initial encounter (H)       clindamycin 300 MG capsule    CLEOCIN    30 capsule    Take 1 capsule (300 mg) by mouth 3 times daily    Ulcer of leg, chronic, right, with fat layer exposed (H), Skin ulcer of right foot with fat layer exposed (H), Type II or unspecified type diabetes mellitus with neurological manifestations, not stated as uncontrolled(250.60) (H)       clopidogrel 75 MG tablet    PLAVIX    30 tablet    Take 1 tablet (75 mg) by mouth daily    Peripheral vascular disease, unspecified       COLACE PO           ferrous sulfate 325 (65 FE) MG tablet    IRON    60 tablet    Take 1 tablet (325 mg) by mouth 2 times daily    Peripheral vascular disease, unspecified       gentamicin 0.1 % cream    GARAMYCIN    30 g    Apply topically daily To right leg ulcer.    Ulcer of right lower leg, with fat layer exposed (H), Chronic venous hypertension with ulcer involving right side (H), Type 2 diabetes, controlled, with neuropathy (H)       hydrocortisone 0.2 % cream    WESTCORT    15 g    Apply sparingly to affected area twice times daily for 14 days.    Dermatitis       insulin glargine 100 UNIT/ML injection    LANTUS     Inject 28 Units Subcutaneous every morning        insulin pen needle 31G X 8 MM    B-D U/F    100 each    Use 1 daily o as directed    Diabetes mellitus, type II (H)       levofloxacin 750 MG tablet    LEVAQUIN    14 tablet    Take 1 tablet (750 mg) by mouth daily    Ulcer of leg, chronic, right, with fat layer exposed (H), Skin ulcer of right foot with fat layer exposed (H), Type II or  "unspecified type diabetes mellitus with neurological manifestations, not stated as uncontrolled(250.60) (H)       LISINOPRIL PO      Take 20 mg by mouth 2 times daily        methocarbamol 750 MG tablet    ROBAXIN    45 tablet    Take 1 tablet (750 mg) by mouth 4 times daily    Closed fracture of neck of right femur, initial encounter (H)       nateglinide 120 MG tablet    STARLIX    90 tablet    TAKE 1 TABLET BY MOUTH THREE TIMES DAILY BEFORE MEALS    Type 2 diabetes, controlled, with neuropathy (H)       OPTIFOAM 6\"X6\" Pads     1 each    1 Box once a week    Ulcer of right leg, with fat layer exposed (H)       * order for DME     2 each    Please measure and distribute 1 pair of 20mm Hg - 30mm Hg knee high ULCER CARE open or closed toe compression stockings.  Patient has a size 13 foot and please take this into consideration.  Jobst or equivalent    Varicose veins of lower extremities with other complications, Venous stasis ulcer of right lower extremity (H)       * order for DME     3 each    Please measure and distribute 1 pair of 20mmHg - 30mmHg knee high open or closed toe compression stockings. Jobst ultrasheer or equivalent.    Varicose veins of both lower extremities with complications       * order for DME     2 each    Please measure and distribute 1 pair of 30mmHg - 40mmHg knee high open toe ulcercare compression stockings. Jobst ultrasheer or equivalent.    Varicose veins of bilateral lower extremities with other complications       oxyCODONE IR 5 MG tablet    ROXICODONE     Take 1-2 tablets (5-10 mg) by mouth every 3 hours as needed for moderate to severe pain And scheduled Oxycodone 10 mg at 0800 and 1300 prior to therapies    Closed fracture of neck of right femur, initial encounter (H)       senna-docusate 8.6-50 MG per tablet    SENOKOT-S;PERICOLACE    100 tablet    Take 2 tablets by mouth 2 times daily as needed for constipation    Constipation, unspecified constipation type       sildenafil 50 MG " tablet    VIAGRA    10 tablet    Take 1 tablet (50 mg) by mouth daily as needed for erectile dysfunction    Vasculogenic erectile dysfunction, unspecified vasculogenic erectile dysfunction type       * silver sulfADIAZINE 1 % cream    SILVADENE    85 g    Apply topically daily To affected areas on right foot and leg.    Ulcer of right lower leg, with fat layer exposed (H), Chronic venous hypertension with ulcer involving right side (H), Type 2 diabetes, controlled, with neuropathy (H)       * SILVADENE 1 % cream   Generic drug:  silver sulfADIAZINE     20 g    Apply topically daily Dispensed at visit for heel and 5th mt ulcers.        simvastatin 10 MG tablet    ZOCOR    90 tablet    Take 1 tablet (10 mg) by mouth At Bedtime    Type 2 diabetes, controlled, with neuropathy (H)       sitagliptin 100 MG tablet    JANUVIA    90 tablet    Take 1 tablet (100 mg) by mouth daily    Type 2 diabetes, controlled, with neuropathy (H)       triamcinolone 0.1 % cream    KENALOG    30 g    Apply sparingly to left heel daily.    Dermatitis of left foot       * Notice:  This list has 5 medication(s) that are the same as other medications prescribed for you. Read the directions carefully, and ask your doctor or other care provider to review them with you.
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
## 2017-12-06 NOTE — TELEPHONE ENCOUNTER
Could we check Puraply for this patient also please per Dr. Mcclain? Same diagnosis codes.   SHANNEN Howard

## 2017-12-07 NOTE — TELEPHONE ENCOUNTER
"PuraPly with ICD10 code: L97.512, E11.49, E11.51 no auth is needed. For in-network benefits, patient has a $300 deducible which has been met, a 20% co-insurance up to $1,750.00 which $1069.81 has been met so far. Once out of pocket had been met patient is covered at 100% for the rest of the year. Call ref. # [id number] \"Gallito\" 12/06/17 5:36pm    Wife and Amos are good with this info and will wait to see what Dr. Mcclain would like to use. -SP  "

## 2017-12-12 ENCOUNTER — OFFICE VISIT (OUTPATIENT)
Dept: PODIATRY | Facility: CLINIC | Age: 72
End: 2017-12-12
Payer: MEDICARE

## 2017-12-12 ENCOUNTER — TELEPHONE (OUTPATIENT)
Dept: PODIATRY | Facility: CLINIC | Age: 72
End: 2017-12-12

## 2017-12-12 VITALS — OXYGEN SATURATION: 98 % | SYSTOLIC BLOOD PRESSURE: 133 MMHG | HEART RATE: 102 BPM | DIASTOLIC BLOOD PRESSURE: 63 MMHG

## 2017-12-12 DIAGNOSIS — L97.912 ULCER OF RIGHT LOWER EXTREMITY WITH FAT LAYER EXPOSED (H): ICD-10-CM

## 2017-12-12 DIAGNOSIS — L97.512 SKIN ULCER OF RIGHT FOOT WITH FAT LAYER EXPOSED (H): Primary | ICD-10-CM

## 2017-12-12 DIAGNOSIS — I87.8 VENOUS STASIS: ICD-10-CM

## 2017-12-12 DIAGNOSIS — E11.49 TYPE II OR UNSPECIFIED TYPE DIABETES MELLITUS WITH NEUROLOGICAL MANIFESTATIONS, NOT STATED AS UNCONTROLLED(250.60) (H): ICD-10-CM

## 2017-12-12 DIAGNOSIS — E11.51 DIABETES MELLITUS WITH PERIPHERAL VASCULAR DISEASE (H): ICD-10-CM

## 2017-12-12 DIAGNOSIS — L30.9 DERMATITIS OF LEFT FOOT: ICD-10-CM

## 2017-12-12 PROCEDURE — 97597 DBRDMT OPN WND 1ST 20 CM/<: CPT | Performed by: PODIATRIST

## 2017-12-12 RX ORDER — TRIAMCINOLONE ACETONIDE 1 MG/G
CREAM TOPICAL
Qty: 30 G | Refills: 1 | Status: SHIPPED | OUTPATIENT
Start: 2017-12-12 | End: 2018-02-06
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
## 2017-12-12 NOTE — PATIENT INSTRUCTIONS
Thanks for coming today.  Ortho[facility]Sports Medicine Clinic  [address] [place] [address]    To schedule future appointments in Ortho Clinic, you may call [phone].    To schedule ordered imaging by your Provider: Call [facility] at [phone]    Facile System available online at:   [email or web address]    Please call if any further questions or concerns [phone] and ask for the Orthopedic Department. Clinic hours 8 am to 5 pm.    Return to clinic if symptoms worsen.

## 2017-12-12 NOTE — TELEPHONE ENCOUNTER
Talked with Dr. Mcclain, patient is doing Wound Vashe and Wet to dry dressings.    Called Xiomy and left a message, gave number to call back.  Ashley Ellison RN

## 2017-12-12 NOTE — MR AVS SNAPSHOT
After Visit Summary   12/12/2017    Amos Walker    MRN: [id number]           Patient Information     Date Of Birth          1945        Visit Information        Provider Department      12/12/2017 10:00 AM Brayan Mcclain DPM [facility]        Today's Diagnoses     Skin ulcer of right foot with fat layer exposed (H)    -  1    Ulcer of right lower extremity with fat layer exposed (H)        Venous stasis        Type II or unspecified type diabetes mellitus with neurological manifestations, not stated as uncontrolled(250.60) (H)        Diabetes mellitus with peripheral vascular disease (H)        Dermatitis of left foot          Care Instructions    Thanks for coming today.  Ortho[facility] Medicine Clinic  [address] [place] [address]    To schedule future appointments in Ortho Clinic, you may call [phone].    To schedule ordered imaging by your Provider: Call [facility] at [phone]    [email or web address] available online at:   [employer])    Please call if any further questions or concerns [phone] and ask for the Orthopedic Department. Clinic hours 8 am to 5 pm.    Return to clinic if symptoms worsen.            Follow-ups after your visit        Follow-up notes from your care team     Return in about 3 weeks (around 1/2/2018).      Your next 10 appointments already scheduled     Dec 19, 2017  7:30 AM CST   Procedure 6.5 hour with U2A ROOM 3   Unit 2A [facility] ([facility])    [address] [facility] [address]               Dec 19, 2017  9:00 AM CST   (Arrive by 8:45 AM)   IR LOWER EXTREMITY ANGIOGRAM RIGHT with UUIR5   [facility] ([facility])    [address] [facility] [address] [phone]           1. Your doctor will need to do a history and physical within 30 days before this  procedure. 2. Your doctor will determine whether you need a 12 lead EKG, as well as which medications should not be taken the morning of the exam. 3. Laboratory tests are to be obtained by your doctor prior to the exam (creatinine, Hgb/Hct, platelet count, and INR) 4. If you have allergies to x-ray contrast or iodine, contact your doctor or a Radiology nurse prior to the exam day for instructions. 5. Someone will need to drive you to and from the hospital. 6. Bring a list of all drugs you are taking; include supplements and over-the-counter medications. 7. Wear comfortable clothes and leave your valuables at home. 8. If you are or may be pregnant, contact your doctor or a Radiology nurse prior to the day of the exam. 9.  If you have diabetes, check with your doctor or a Radiology nurse to see if your insulin needs to be adjusted for the exam. 10. If you are taking Coumadin (to thin you blood) please contact your doctor or a Radiology nurse at least 5 days before the exam for special instructions. 11. You should not have received barium (x-ray contrast) within 48 hours of this exam. 12. The day before your exam you may eat your regular diet and are encouraged to drink at least 2 quarts of clear liquids. Drink no alcoholic beverages for 24 hours prior to the exam. 13. If you have a colostomy you will need to irrigate it with tap water at 8PM the evening before and again at 6AM the morning of the exam. 14. Do not smoke for 24 hours prior to the procedure. 15. Do not eat any solid food or milk products for 6 hours prior to the exam. You may drink clear liquids until 2 hours prior to the exam. Clear liquids include the following: water, Jell-O, clear broth, apple juice or any non-carbonated drink that you can see through (no pop!) 16. The morning of the exam you may brush your teeth and take medications as directed with a sip of water. 17. Tell the Radiology nurse if you have any allergies. 18. You will be asked to empty  your bladder before the exam begins. 19. Following the exam you will need to remain on complete bedrest for 4-6 hours. The nurse will monitor your vital signs, puncture site, and the pulses and temperature of the arm or leg that was punctured. 20. When discharged, you cannot drive until morning, and an adult must be with you until then. You should stay in the [place] area overnight.            Dec 29, 2017 11:25 AM CST   (Arrive by 11:10 AM)   Return Visit with Racheal Swift MD   [facility] ([facility])    [address] [place] [address]  [address]th [facility] [address]   [phone]            Jan 02, 2018 10:00 AM CST   Return Visit with Brayan Mcclain DPM   [facility])    [address] [facility] [address]   [phone]            Jan 09, 2018 10:00 AM CST   Return Visit with Brayan Mcclain DPM   [facility])    [address] [facility] [address]   [phone]            Jan 16, 2018 10:30 AM CST   Return Visit with Brayan Mcclain DPM   [facility])    [address] [facility] [address]   [phone]            Jan 23, 2018 10:00 AM CST   Return Visit with Brayan Mcclain DPM   [facility])    [address] [facility] [address]   [phone]            Jan 30, 2018 10:00 AM CST   Return Visit with Brayan Mcclain DPM   [facility])    [address] [facility] [address]   [phone]            Feb 06, 2018  9:30 AM CST   Return Visit with Brayan Mcclain DPM   [facility] ([facility])    [address] [facility] [address]   [phone]            Jun 20, 2018 10:30 AM CDT   RETURN RETINA with Jen Borges  Jasvir Shipley MD   Eye Clinic ([facility])    [address]  [address] [facility]  9th [address] Clin 9a  [facility] [id number] [phone]              Who to contact     If you have questions or need follow up information about today's clinic visit or your schedule please contact [facility] directly at [phone].  Normal or non-critical lab and imaging results will be communicated to you by Odnoklassnikihart, letter or phone within 4 business days after the clinic has received the results. If you do not hear from us within 7 days, please contact the clinic through Odnoklassnikihart or phone. If you have a critical or abnormal lab result, we will notify you by phone as soon as possible.  Submit refill requests through Gtxh or call your pharmacy and they will forward the refill request to us. Please allow 3 business days for your refill to be completed.          Additional Information About Your Visit        OdnoklassnikiharHybrid Security Information     Gtxh gives you secure access to your electronic health record. If you see a primary care provider, you can also send messages to your care team and make appointments. If you have questions, please call your primary care clinic.  If you do not have a primary care provider, please call [phone] and they will assist you.      Gtxh is an electronic gateway that provides easy, online access to your medical records. With Gtxh, you can request a clinic appointment, read your test results, renew a prescription or communicate with your care team.     To access your existing account, please contact your [facility] or call [phone] for assistance.        Care EveryWhere ID     This is your Care EveryWhere ID. This could be used by other organizations to access your [place] medical records  [id number]        Your Vitals Were     Pulse Pulse Oximetry                102 98%           Blood Pressure from Last 3 Encounters:   12/12/17  133/63   12/05/17 148/73   11/30/17 136/64    Weight from Last 3 Encounters:   10/19/17 83.5 kg (184 lb)   10/10/17 81.2 kg (179 lb)   09/11/17 77.7 kg (171 lb 6.4 oz)              We Performed the Following     DEBRIDEMENT WOUND UP TO 20 SQ CM          Where to get your medicines      These medications were sent to [facility] - [place] [address] AT [facility] [address]  [address], [facility] [address]     Phone:  [phone]     triamcinolone 0.1 % cream          Primary Care Provider Office Phone # Fax #    Racheal Swift [phone]       [address] [place] [address] [address] [place] [address]        Equal Access to Services     SAMSON MELTON : Hadii niurka ferrell Soger, waaxda luqadaha, qaybta kaalmakylee jeffery, yolanda duran . So [facility] [phone].    ATENCIÓN: Si habla español, tiene a rodrigues disposición servicios gratuitos de asistencia lingüística. Mary al [phone].    We comply with applicable federal civil rights laws and [place] laws. We do not discriminate on the basis of race, color, national origin, age, disability, sex, sexual orientation, or gender identity.            Thank you!     Thank you for choosing [facility]  for your care. Our goal is always to provide you with excellent care. Hearing back from our patients is one way we can continue to improve our services. Please take a few minutes to complete the written survey that you may receive in the mail after your visit with us. Thank you!             Your Updated Medication List - Protect others around you: Learn how to safely use, store and throw away your medicines at www.disposemymeds.org.          This list is accurate as of: 12/12/17  1:07 PM.  Always use your most recent med list.                   Brand Name Dispense Instructions for use Diagnosis    acetaminophen 500 MG tablet    TYLENOL     Take 2 tablets (1,000 mg) by mouth 3  times daily    Closed fracture of neck of right femur, initial encounter (H)       ammonium lactate 12 % cream    LAC-HYDRIN    385 g    Apply topically 2 times daily as needed for dry skin    Venous stasis, Type 2 diabetes, controlled, with neuropathy (H)       ascorbic acid 500 MG Tabs     30 tablet    Take 1 tablet (500 mg) by mouth 2 times daily    Ulcer of right lower leg, with fat layer exposed (H)       blood glucose monitoring lancets     3 Box    Use to test blood sugars 2 as directed.    Type 2 diabetes, uncontrolled, with neuropathy (H)       blood glucose monitoring test strip    ONETOUCH ULTRA    60 strip    Use to test blood sugars 2 times daily or as directed.    Type 2 diabetes mellitus (H)       Cholecalciferol 3000 UNITS Tabs     30 tablet    Take 3,000 Units by mouth daily    Closed fracture of neck of right femur, initial encounter (H)       clindamycin 300 MG capsule    CLEOCIN    30 capsule    Take 1 capsule (300 mg) by mouth 3 times daily    Ulcer of leg, chronic, right, with fat layer exposed (H), Skin ulcer of right foot with fat layer exposed (H), Type II or unspecified type diabetes mellitus with neurological manifestations, not stated as uncontrolled(250.60) (H)       clopidogrel 75 MG tablet    PLAVIX    30 tablet    Take 1 tablet (75 mg) by mouth daily    Peripheral vascular disease, unspecified       COLACE PO           ferrous sulfate 325 (65 FE) MG tablet    IRON    60 tablet    Take 1 tablet (325 mg) by mouth 2 times daily    Peripheral vascular disease, unspecified       gentamicin 0.1 % cream    GARAMYCIN    30 g    Apply topically daily To right leg ulcer.    Ulcer of right lower leg, with fat layer exposed (H), Chronic venous hypertension with ulcer involving right side (H), Type 2 diabetes, controlled, with neuropathy (H)       hydrocortisone 0.2 % cream    WESTCORT    15 g    Apply sparingly to affected area twice times daily for 14 days.    Dermatitis       insulin glargine  "100 UNIT/ML injection    LANTUS SOLOSTAR    15 mL    Inject 25 Units Subcutaneous At Bedtime    Type 2 diabetes mellitus with diabetic peripheral angiopathy without gangrene, with long-term current use of insulin (H)       insulin pen needle 31G X 8 MM    B-D U/F    100 each    Use 1 daily o as directed    Diabetes mellitus, type II (H)       levofloxacin 750 MG tablet    LEVAQUIN    14 tablet    Take 1 tablet (750 mg) by mouth daily    Ulcer of leg, chronic, right, with fat layer exposed (H), Skin ulcer of right foot with fat layer exposed (H), Type II or unspecified type diabetes mellitus with neurological manifestations, not stated as uncontrolled(250.60) (H)       LISINOPRIL PO      Take 20 mg by mouth 2 times daily        methocarbamol 750 MG tablet    ROBAXIN    45 tablet    Take 1 tablet (750 mg) by mouth 4 times daily    Closed fracture of neck of right femur, initial encounter (H)       nateglinide 120 MG tablet    STARLIX    90 tablet    TAKE 1 TABLET BY MOUTH THREE TIMES DAILY BEFORE MEALS    Type 2 diabetes, controlled, with neuropathy (H)       OPTIFOAM 6\"X6\" Pads     1 each    1 Box once a week    Ulcer of right leg, with fat layer exposed (H)       * order for DME     2 each    Please measure and distribute 1 pair of 20mm Hg - 30mm Hg knee high ULCER CARE open or closed toe compression stockings.  Patient has a size 13 foot and please take this into consideration.  Jobst or equivalent    Varicose veins of lower extremities with other complications, Venous stasis ulcer of right lower extremity (H)       * order for DME     3 each    Please measure and distribute 1 pair of 20mmHg - 30mmHg knee high open or closed toe compression stockings. Jobst ultrasheer or equivalent.    Varicose veins of both lower extremities with complications       * order for DME     2 each    Please measure and distribute 1 pair of 30mmHg - 40mmHg knee high open toe ulcercare compression stockings. Jobst ultrasheer or " equivalent.    Varicose veins of bilateral lower extremities with other complications       oxyCODONE IR 5 MG tablet    ROXICODONE     Take 1-2 tablets (5-10 mg) by mouth every 3 hours as needed for moderate to severe pain And scheduled Oxycodone 10 mg at 0800 and 1300 prior to therapies    Closed fracture of neck of right femur, initial encounter (H)       senna-docusate 8.6-50 MG per tablet    SENOKOT-S;PERICOLACE    100 tablet    Take 2 tablets by mouth 2 times daily as needed for constipation    Constipation, unspecified constipation type       sildenafil 50 MG tablet    VIAGRA    10 tablet    Take 1 tablet (50 mg) by mouth daily as needed for erectile dysfunction    Vasculogenic erectile dysfunction, unspecified vasculogenic erectile dysfunction type       * silver sulfADIAZINE 1 % cream    SILVADENE    85 g    Apply topically daily To affected areas on right foot and leg.    Ulcer of right lower leg, with fat layer exposed (H), Chronic venous hypertension with ulcer involving right side (H), Type 2 diabetes, controlled, with neuropathy (H)       * SILVADENE 1 % cream   Generic drug:  silver sulfADIAZINE     20 g    Apply topically daily Dispensed at visit for heel and 5th mt ulcers.        simvastatin 10 MG tablet    ZOCOR    90 tablet    Take 1 tablet (10 mg) by mouth At Bedtime    Type 2 diabetes, controlled, with neuropathy (H)       sitagliptin 100 MG tablet    JANUVIA    90 tablet    Take 1 tablet (100 mg) by mouth daily    Type 2 diabetes, controlled, with neuropathy (H)       triamcinolone 0.1 % cream    KENALOG    30 g    Apply sparingly to left heel daily.    Dermatitis of left foot       * Notice:  This list has 5 medication(s) that are the same as other medications prescribed for you. Read the directions carefully, and ask your doctor or other care provider to review them with you.

## 2017-12-12 NOTE — TELEPHONE ENCOUNTER
[facility] Call Center    Phone Message    Name of Caller: Xiomy    Phone Number: Other phone number:  [phone]  Ext: [phone]    Best time to return call: any    May a detailed message be left on voicemail: yes    Relation to patient: Other Name: [facility] care  Relationship:   Is there legal documentation in chart to discuss information with this person:       Reason for Call: Order(s): Other:   What is being requested: needs to know what kind of dressing for wound care  Reason for requested: for insurance, supplies  Date needed: today  Provider name: ghanshyam         Action Taken: Message routed to:  Adult Clinics: Podiatry p [id number]

## 2017-12-12 NOTE — NURSING NOTE
"Amos Walker's goals for this visit include: Recheck leg   He requests these members of his care team be copied on today's visit information: yes    PCP: Racheal Swift    Referring Provider:  Referred Self, MD  No address on file    Chief Complaint   Patient presents with     RECHECK     Recheck leg        Initial /63  Pulse 102  SpO2 98% Estimated body mass index is 22.55 kg/(m^2) as calculated from the following:    Height as of 10/19/17: 1.924 m (6' 3.75\").    Weight as of 10/19/17: 83.5 kg (184 lb).  Medication Reconciliation: complete    "

## 2017-12-15 ENCOUNTER — TELEPHONE (OUTPATIENT)
Dept: INTERVENTIONAL RADIOLOGY/VASCULAR | Facility: CLINIC | Age: 72
End: 2017-12-15

## 2017-12-19 ENCOUNTER — APPOINTMENT (OUTPATIENT)
Dept: MEDSURG UNIT | Facility: CLINIC | Age: 72
End: 2017-12-19
Attending: RADIOLOGY
Payer: MEDICARE

## 2017-12-19 ENCOUNTER — APPOINTMENT (OUTPATIENT)
Dept: INTERVENTIONAL RADIOLOGY/VASCULAR | Facility: CLINIC | Age: 72
End: 2017-12-19
Attending: RADIOLOGY
Payer: MEDICARE

## 2017-12-19 ENCOUNTER — APPOINTMENT (OUTPATIENT)
Dept: CT IMAGING | Facility: CLINIC | Age: 72
End: 2017-12-19
Attending: RADIOLOGY
Payer: MEDICARE

## 2017-12-19 ENCOUNTER — HOSPITAL ENCOUNTER (OUTPATIENT)
Facility: CLINIC | Age: 72
Discharge: HOME | End: 2017-12-19
Attending: RADIOLOGY | Admitting: RADIOLOGY
Payer: MEDICARE

## 2017-12-19 VITALS
OXYGEN SATURATION: 98 % | RESPIRATION RATE: 20 BRPM | SYSTOLIC BLOOD PRESSURE: 128 MMHG | TEMPERATURE: 97.9 F | HEART RATE: 70 BPM | DIASTOLIC BLOOD PRESSURE: 68 MMHG

## 2017-12-19 DIAGNOSIS — I70.213 ATHEROSCLEROSIS OF NATIVE ARTERY OF BOTH LOWER EXTREMITIES WITH INTERMITTENT CLAUDICATION (H): ICD-10-CM

## 2017-12-19 LAB
ANION GAP SERPL CALCULATED.3IONS-SCNC: 8 MMOL/L (ref 3–14)
APTT PPP: 28 SEC (ref 22–37)
BUN SERPL-MCNC: 23 MG/DL (ref 7–30)
CALCIUM SERPL-MCNC: 8.6 MG/DL (ref 8.5–10.1)
CHLORIDE SERPL-SCNC: 103 MMOL/L (ref 94–109)
CO2 SERPL-SCNC: 24 MMOL/L (ref 20–32)
CREAT SERPL-MCNC: 1.09 MG/DL (ref 0.66–1.25)
ERYTHROCYTE [DISTWIDTH] IN BLOOD BY AUTOMATED COUNT: 14.1 % (ref 10–15)
GFR SERPL CREATININE-BSD FRML MDRD: 67 ML/MIN/1.7M2
GLUCOSE BLDC GLUCOMTR-MCNC: 142 MG/DL (ref 70–99)
GLUCOSE BLDC GLUCOMTR-MCNC: 84 MG/DL (ref 70–99)
GLUCOSE BLDC GLUCOMTR-MCNC: 90 MG/DL (ref 70–99)
GLUCOSE BLDC GLUCOMTR-MCNC: 97 MG/DL (ref 70–99)
GLUCOSE SERPL-MCNC: 58 MG/DL (ref 70–99)
HCT VFR BLD AUTO: 35.8 % (ref 40–53)
HGB BLD-MCNC: 11.5 G/DL (ref 13.3–17.7)
INR PPP: 1.05 (ref 0.86–1.14)
MCH RBC QN AUTO: 30.1 PG (ref 26.5–33)
MCHC RBC AUTO-ENTMCNC: 32.1 G/DL (ref 31.5–36.5)
MCV RBC AUTO: 94 FL (ref 78–100)
PLATELET # BLD AUTO: 216 10E9/L (ref 150–450)
POTASSIUM SERPL-SCNC: 4.2 MMOL/L (ref 3.4–5.3)
RBC # BLD AUTO: 3.82 10E12/L (ref 4.4–5.9)
SODIUM SERPL-SCNC: 136 MMOL/L (ref 133–144)
WBC # BLD AUTO: 7.1 10E9/L (ref 4–11)

## 2017-12-19 PROCEDURE — 85730 THROMBOPLASTIN TIME PARTIAL: CPT | Performed by: RADIOLOGY

## 2017-12-19 PROCEDURE — 27210732 ZZH ACCESSORY CR1

## 2017-12-19 PROCEDURE — 99153 MOD SED SAME PHYS/QHP EA: CPT

## 2017-12-19 PROCEDURE — 25000128 H RX IP 250 OP 636: Performed by: PHYSICIAN ASSISTANT

## 2017-12-19 PROCEDURE — 25000125 ZZHC RX 250: Performed by: RADIOLOGY

## 2017-12-19 PROCEDURE — 27210804 ZZH SHEATH CR3

## 2017-12-19 PROCEDURE — 25000128 H RX IP 250 OP 636: Performed by: RADIOLOGY

## 2017-12-19 PROCEDURE — 27210908 ZZH NEEDLE CR4

## 2017-12-19 PROCEDURE — 27210780 ZZH KIT CR3

## 2017-12-19 PROCEDURE — 93926 LOWER EXTREMITY STUDY: CPT

## 2017-12-19 PROCEDURE — 80048 BASIC METABOLIC PNL TOTAL CA: CPT | Performed by: RADIOLOGY

## 2017-12-19 PROCEDURE — 25800025 ZZH RX 258: Performed by: RADIOLOGY

## 2017-12-19 PROCEDURE — 27210905 ZZH KIT CR7

## 2017-12-19 PROCEDURE — 75635 CT ANGIO ABDOMINAL ARTERIES: CPT

## 2017-12-19 PROCEDURE — 85610 PROTHROMBIN TIME: CPT | Performed by: RADIOLOGY

## 2017-12-19 PROCEDURE — 82962 GLUCOSE BLOOD TEST: CPT

## 2017-12-19 PROCEDURE — C1887 CATHETER, GUIDING: HCPCS

## 2017-12-19 PROCEDURE — C1769 GUIDE WIRE: HCPCS

## 2017-12-19 PROCEDURE — 27210808 ZZH SHEATH CR7

## 2017-12-19 PROCEDURE — 40000168 ZZH STATISTIC PP CARE STAGE 3

## 2017-12-19 PROCEDURE — 85027 COMPLETE CBC AUTOMATED: CPT | Performed by: RADIOLOGY

## 2017-12-19 PROCEDURE — 99152 MOD SED SAME PHYS/QHP 5/>YRS: CPT

## 2017-12-19 RX ORDER — ACETAMINOPHEN 500 MG
500-1000 TABLET ORAL EVERY 6 HOURS PRN
Status: DISCONTINUED | OUTPATIENT
Start: 2017-12-19 | End: 2017-12-19 | Stop reason: HOSPADM

## 2017-12-19 RX ORDER — FENTANYL CITRATE 50 UG/ML
25-50 INJECTION, SOLUTION INTRAMUSCULAR; INTRAVENOUS EVERY 5 MIN PRN
Status: DISCONTINUED | OUTPATIENT
Start: 2017-12-19 | End: 2017-12-19 | Stop reason: HOSPADM

## 2017-12-19 RX ORDER — IODIXANOL 320 MG/ML
150 INJECTION, SOLUTION INTRAVASCULAR ONCE
Status: DISCONTINUED | OUTPATIENT
Start: 2017-12-19 | End: 2017-12-19 | Stop reason: CLARIF

## 2017-12-19 RX ORDER — DEXTROSE MONOHYDRATE 25 G/50ML
25 INJECTION, SOLUTION INTRAVENOUS ONCE
Status: COMPLETED | OUTPATIENT
Start: 2017-12-19 | End: 2017-12-19

## 2017-12-19 RX ORDER — SODIUM CHLORIDE 9 MG/ML
INJECTION, SOLUTION INTRAVENOUS CONTINUOUS
Status: DISCONTINUED | OUTPATIENT
Start: 2017-12-19 | End: 2017-12-19 | Stop reason: HOSPADM

## 2017-12-19 RX ORDER — DEXTROSE MONOHYDRATE 50 MG/ML
INJECTION, SOLUTION INTRAVENOUS CONTINUOUS
Status: DISCONTINUED | OUTPATIENT
Start: 2017-12-19 | End: 2017-12-19 | Stop reason: HOSPADM

## 2017-12-19 RX ORDER — NALOXONE HYDROCHLORIDE 0.4 MG/ML
.1-.4 INJECTION, SOLUTION INTRAMUSCULAR; INTRAVENOUS; SUBCUTANEOUS
Status: DISCONTINUED | OUTPATIENT
Start: 2017-12-19 | End: 2017-12-19 | Stop reason: HOSPADM

## 2017-12-19 RX ORDER — IOPAMIDOL 755 MG/ML
120 INJECTION, SOLUTION INTRAVASCULAR ONCE
Status: COMPLETED | OUTPATIENT
Start: 2017-12-19 | End: 2017-12-19

## 2017-12-19 RX ORDER — FLUMAZENIL 0.1 MG/ML
0.2 INJECTION, SOLUTION INTRAVENOUS
Status: DISCONTINUED | OUTPATIENT
Start: 2017-12-19 | End: 2017-12-19 | Stop reason: HOSPADM

## 2017-12-19 RX ORDER — LIDOCAINE 40 MG/G
CREAM TOPICAL
Status: DISCONTINUED | OUTPATIENT
Start: 2017-12-19 | End: 2017-12-19 | Stop reason: HOSPADM

## 2017-12-19 RX ADMIN — HEPARIN SODIUM 5000 UNITS: 1000 INJECTION, SOLUTION INTRAVENOUS; SUBCUTANEOUS at 09:39

## 2017-12-19 RX ADMIN — IOPAMIDOL 120 ML: 755 INJECTION, SOLUTION INTRAVENOUS at 12:37

## 2017-12-19 RX ADMIN — FENTANYL CITRATE 25 MCG: 50 INJECTION INTRAMUSCULAR; INTRAVENOUS at 09:39

## 2017-12-19 RX ADMIN — MIDAZOLAM 0.5 MG: 1 INJECTION INTRAMUSCULAR; INTRAVENOUS at 09:39

## 2017-12-19 RX ADMIN — LIDOCAINE HYDROCHLORIDE 12 ML: 10 INJECTION, SOLUTION EPIDURAL; INFILTRATION; INTRACAUDAL; PERINEURAL at 10:07

## 2017-12-19 RX ADMIN — DEXTROSE MONOHYDRATE: 50 INJECTION, SOLUTION INTRAVENOUS at 10:09

## 2017-12-19 RX ADMIN — SODIUM CHLORIDE: 9 INJECTION, SOLUTION INTRAVENOUS at 08:31

## 2017-12-19 RX ADMIN — MIDAZOLAM 0.5 MG: 1 INJECTION INTRAMUSCULAR; INTRAVENOUS at 09:31

## 2017-12-19 RX ADMIN — FENTANYL CITRATE 25 MCG: 50 INJECTION INTRAMUSCULAR; INTRAVENOUS at 09:30

## 2017-12-19 RX ADMIN — DEXTROSE MONOHYDRATE 25 ML: 500 INJECTION PARENTERAL at 08:55

## 2017-12-19 NOTE — IR NOTE
Patient Name: Amos Walker  Medical Record Number: [id number]  Today's Date: 12/19/2017    Procedure: attempted left groin access for angiogram without success  Proceduralist: Dr Jenkins     Sedation start time: 0930  Sedation end time: 1015  Sedation medications administered: fent 50 mcg versed 1 mg   Total sedation time: 45 mins    Procedure start time: 0950  Puncture time: 1000  Procedure end time: 1010  BGM= 97   0940...changed IV fluids to D5W per Alice's order  BGM= 84    1000    Report given to: SAFIA Suggs      Other Notes: Pt arrived to IR room 5 from . Pt denies any questions or concerns regarding procedure. Pt positioned supine and bilateral pulses in feet assessed via doppler and monitored per protocol. Unable to gain access and pt will go for a CT scan prior to discharge today. Pt transferred back to .

## 2017-12-19 NOTE — DISCHARGE INSTRUCTIONS
[facility]  Going Home after Sedation      For 24 hours:    An adult should stay with you.    Relax and take it easy.    DO NOT make any important legal decisions.    DO NOT drive or operate machines at home or at work.    Resume your regular diet and drink plenty of fluids.    CALL THE PHYSICIAN IF:    You develop nausea or vomiting    You develop hives or a rash or any unexplained itching    ADDITIONAL INSTRUCTIONS:    [facility] INTERVENTIONAL RADIOLOGY DEPARTMENT        Procedure Physician:      Dr. Wan                           Date of procedure:December 19, 2017        Telephone numbers:     [phone]      Monday-Friday 8:00 am to 4:30 pm                                              [phone]       After 4:30 pm Monday-Friday, Weekends  & Holidays. Ask for the Interventional Radiologist on call. Someone is  available 24 hours/day        [facility] toll free number: [phone]  Monday-Friday 8:00 am to 4:30 pm

## 2017-12-19 NOTE — IP AVS SNAPSHOT
[address] [address]                                       After Visit Summary   12/19/2017    Amos Walker    MRN: [id number]           After Visit Summary Signature Page     I have received my discharge instructions, and my questions have been answered. I have discussed any challenges I see with this plan with the nurse or doctor.    ..........................................................................................................................................  Patient/Patient Representative Signature      ..........................................................................................................................................  Patient Representative Print Name and Relationship to Patient    ..................................................               ................................................  Date                                            Time    ..........................................................................................................................................  Reviewed by Signature/Title    ...................................................              ..............................................  Date                                                            Time

## 2017-12-19 NOTE — IP AVS SNAPSHOT
MRN:[id number]                      After Visit Summary   12/19/2017    Amos Walker    MRN: [id number]           Visit Information        Department      12/19/2017  7:32 AM [address] [facility]          Review of your medicines      UNREVIEWED medicines. Ask your doctor about these medicines        Dose / Directions    acetaminophen 500 MG tablet   Commonly known as:  TYLENOL   Used for:  Closed fracture of neck of right femur, initial encounter (H)        Dose:  1000 mg   Take 2 tablets (1,000 mg) by mouth 3 times daily   Refills:  0       ammonium lactate 12 % cream   Commonly known as:  LAC-HYDRIN   Used for:  Venous stasis, Type 2 diabetes, controlled, with neuropathy (H)        Apply topically 2 times daily as needed for dry skin   Quantity:  385 g   Refills:  3       ascorbic acid 500 MG Tabs   Used for:  Ulcer of right lower leg, with fat layer exposed (H)        Dose:  500 mg   Take 1 tablet (500 mg) by mouth 2 times daily   Quantity:  30 tablet   Refills:  0       Cholecalciferol 3000 UNITS Tabs   Used for:  Closed fracture of neck of right femur, initial encounter (H)        Dose:  3000 Units   Take 3,000 Units by mouth daily   Quantity:  30 tablet   Refills:  0       clindamycin 300 MG capsule   Commonly known as:  CLEOCIN   Used for:  Ulcer of leg, chronic, right, with fat layer exposed (H), Skin ulcer of right foot with fat layer exposed (H), Type II or unspecified type diabetes mellitus with neurological manifestations, not stated as uncontrolled(250.60) (H)        Dose:  300 mg   Take 1 capsule (300 mg) by mouth 3 times daily   Quantity:  30 capsule   Refills:  0       clopidogrel 75 MG tablet   Commonly known as:  PLAVIX   Used for:  Peripheral vascular disease, unspecified        Dose:  75 mg   Take 1 tablet (75 mg) by mouth daily   Quantity:  30 tablet   Refills:  11       COLACE PO        Refills:  0       ferrous sulfate 325 (65 FE) MG tablet   Commonly known as:  IRON    Used for:  Peripheral vascular disease, unspecified        Dose:  325 mg   Take 1 tablet (325 mg) by mouth 2 times daily   Quantity:  60 tablet   Refills:  11       gentamicin 0.1 % cream   Commonly known as:  GARAMYCIN   Used for:  Ulcer of right lower leg, with fat layer exposed (H), Chronic venous hypertension with ulcer involving right side (H), Type 2 diabetes, controlled, with neuropathy (H)        Apply topically daily To right leg ulcer.   Quantity:  30 g   Refills:  5       hydrocortisone 0.2 % cream   Commonly known as:  WESTCORT   Used for:  Dermatitis        Apply sparingly to affected area twice times daily for 14 days.   Quantity:  15 g   Refills:  3       insulin glargine 100 UNIT/ML injection   Commonly known as:  LANTUS SOLOSTAR   Used for:  Type 2 diabetes mellitus with diabetic peripheral angiopathy without gangrene, with long-term current use of insulin (H)        Dose:  25 Units   Inject 25 Units Subcutaneous At Bedtime   Quantity:  15 mL   Refills:  2       levofloxacin 750 MG tablet   Commonly known as:  LEVAQUIN   Used for:  Ulcer of leg, chronic, right, with fat layer exposed (H), Skin ulcer of right foot with fat layer exposed (H), Type II or unspecified type diabetes mellitus with neurological manifestations, not stated as uncontrolled(250.60) (H)        Dose:  750 mg   Take 1 tablet (750 mg) by mouth daily   Quantity:  14 tablet   Refills:  0       LISINOPRIL PO        Dose:  20 mg   Take 20 mg by mouth 2 times daily   Refills:  0       methocarbamol 750 MG tablet   Commonly known as:  ROBAXIN   Used for:  Closed fracture of neck of right femur, initial encounter (H)        Dose:  750 mg   Take 1 tablet (750 mg) by mouth 4 times daily   Quantity:  45 tablet   Refills:  0       nateglinide 120 MG tablet   Commonly known as:  STARLIX   Used for:  Type 2 diabetes, controlled, with neuropathy (H)        TAKE 1 TABLET BY MOUTH THREE TIMES DAILY BEFORE MEALS   Quantity:  90 tablet   Refills:   11       oxyCODONE IR 5 MG tablet   Commonly known as:  ROXICODONE   Used for:  Closed fracture of neck of right femur, initial encounter (H)        Dose:  5-10 mg   Take 1-2 tablets (5-10 mg) by mouth every 3 hours as needed for moderate to severe pain And scheduled Oxycodone 10 mg at 0800 and 1300 prior to therapies   Refills:  0       senna-docusate 8.6-50 MG per tablet   Commonly known as:  SENOKOT-S;PERICOLACE   Used for:  Constipation, unspecified constipation type        Dose:  2 tablet   Take 2 tablets by mouth 2 times daily as needed for constipation   Quantity:  100 tablet   Refills:  0       sildenafil 50 MG tablet   Commonly known as:  VIAGRA   Used for:  Vasculogenic erectile dysfunction, unspecified vasculogenic erectile dysfunction type        Dose:  50 mg   Take 1 tablet (50 mg) by mouth daily as needed for erectile dysfunction   Quantity:  10 tablet   Refills:  11       * silver sulfADIAZINE 1 % cream   Commonly known as:  SILVADENE   Used for:  Ulcer of right lower leg, with fat layer exposed (H), Chronic venous hypertension with ulcer involving right side (H), Type 2 diabetes, controlled, with neuropathy (H)        Apply topically daily To affected areas on right foot and leg.   Quantity:  85 g   Refills:  5       * SILVADENE 1 % cream   Generic drug:  silver sulfADIAZINE        Apply topically daily Dispensed at visit for heel and 5th mt ulcers.   Quantity:  20 g   Refills:  0       simvastatin 10 MG tablet   Commonly known as:  ZOCOR   Used for:  Type 2 diabetes, controlled, with neuropathy (H)        Dose:  10 mg   Take 1 tablet (10 mg) by mouth At Bedtime   Quantity:  90 tablet   Refills:  3       sitagliptin 100 MG tablet   Commonly known as:  JANUVIA   Used for:  Type 2 diabetes, controlled, with neuropathy (H)        Dose:  100 mg   Take 1 tablet (100 mg) by mouth daily   Quantity:  90 tablet   Refills:  3       triamcinolone 0.1 % cream   Commonly known as:  KENALOG   Used for:  Dermatitis  "of left foot        Apply sparingly to left heel daily.   Quantity:  30 g   Refills:  1       * Notice:  This list has 2 medication(s) that are the same as other medications prescribed for you. Read the directions carefully, and ask your doctor or other care provider to review them with you.      CONTINUE these medicines which have NOT CHANGED        Dose / Directions    blood glucose monitoring lancets   Used for:  Type 2 diabetes, uncontrolled, with neuropathy (H)        Use to test blood sugars 2 as directed.   Quantity:  3 Box   Refills:  3       blood glucose monitoring test strip   Commonly known as:  ONETOUCH ULTRA   Used for:  Type 2 diabetes mellitus (H)        Use to test blood sugars 2 times daily or as directed.   Quantity:  60 strip   Refills:  11       insulin pen needle 31G X 8 MM   Commonly known as:  B-D U/F   Used for:  Diabetes mellitus, type II (H)        Use 1 daily o as directed   Quantity:  100 each   Refills:  3       OPTIFOAM 6\"X6\" Pads   Used for:  Ulcer of right leg, with fat layer exposed (H)        Dose:  1 Box   1 Box once a week   Quantity:  1 each   Refills:  6       * order for DME   Used for:  Varicose veins of lower extremities with other complications, Venous stasis ulcer of right lower extremity (H)        Please measure and distribute 1 pair of 20mm Hg - 30mm Hg knee high ULCER CARE open or closed toe compression stockings.  Patient has a size 13 foot and please take this into consideration.  Jobst or equivalent   Quantity:  2 each   Refills:  1       * order for DME   Used for:  Varicose veins of both lower extremities with complications        Please measure and distribute 1 pair of 20mmHg - 30mmHg knee high open or closed toe compression stockings. Jobst ultrasheer or equivalent.   Quantity:  3 each   Refills:  12       * order for DME   Used for:  Varicose veins of bilateral lower extremities with other complications        Please measure and distribute 1 pair of 30mmHg - " 40mmHg knee high open toe ulcercare compression stockings. Jobst ultrasheer or equivalent.   Quantity:  2 each   Refills:  6       * Notice:  This list has 3 medication(s) that are the same as other medications prescribed for you. Read the directions carefully, and ask your doctor or other care provider to review them with you.             Protect others around you: Learn how to safely use, store and throw away your medicines at www.disposemymeds.org.         Follow-ups after your visit        Your next 10 appointments already scheduled     Dec 29, 2017 11:25 AM CST   (Arrive by 11:10 AM)   Return Visit with Racheal Swift MD   [facility] ([facility])    [address] [place] [address]  4th [facility] [address]   [phone]            Jan 02, 2018 10:00 AM CST   Return Visit with Brayan Mcclain DPM   [facility])    [address] [place] [address]   [phone]            Jan 09, 2018 10:00 AM CST   Return Visit with Brayan Mcclain DPM   [facility] ([facility])    [address] [place] [address]   [phone]            Jan 16, 2018 10:30 AM CST   Return Visit with Brayan Mcclain DPM   [facility])    [address] [address] [facility] [address]   [phone]            Jan 23, 2018 10:00 AM CST   Return Visit with Brayan Mcclain DPM   [facility] ([facility])    [address] [place] [address]   [phone]            Jan 30, 2018 10:00 AM CST   Return Visit with Brayan Mcclain DPM   [facility])    [address] [place] [address]   [phone]            Feb 06, 2018  9:30 AM CST   Return Visit with Brayan Mcclain DPM   [facility]  [facility])    [address] [place] [address]   [phone]            Jun 20, 2018 10:30 AM CDT   RETURN RETINA with Jen [facility]anda MD   [facility])    [address]  [address] [facility]  [address] [address]  [facility] [address]   [phone]               Care Instructions        Further instructions from your care team       [facility]  Going Home after Sedation      For 24 hours:    An adult should stay with you.    Relax and take it easy.    DO NOT make any important legal decisions.    DO NOT drive or operate machines at home or at work.    Resume your regular diet and drink plenty of fluids.    CALL THE PHYSICIAN IF:    You develop nausea or vomiting    You develop hives or a rash or any unexplained itching    ADDITIONAL INSTRUCTIONS:    [facility] INTERVENTIONAL RADIOLOGY DEPARTMENT        Procedure Physician:      Dr. Wan                           Date of procedure:December 19, 2017        Telephone numbers:     [phone]      Monday-Friday 8:00 am to 4:30 pm                                              [phone]       After 4:30 pm Monday-Friday, Weekends  & Holidays. Ask for the Interventional Radiologist on call. Someone is  available 24 hours/day        [facility] toll free number: [phone]  Monday-Friday 8:00 am to 4:30 pm                                                                                                                                                                                                                        Additional Information About Your Visit        MyChart Information     [employer] gives you secure access to your electronic health record. If you see a primary care provider, you can also send messages to your care team and make appointments. If you have questions, please call your primary care clinic.  If you do not have a primary care provider, please call [phone] and they will assist  you.        Care EveryWhere ID     This is your Care EveryWhere ID. This could be used by other organizations to access your [facility] medical records  [id number]        Your Vitals Were     Blood Pressure Pulse Temperature Respirations Pulse Oximetry       128/68 (BP Location: Left arm) 70 97.9  F (36.6  C) (Oral) 20 98%        Primary Care Provider Office Phone # Fax #    Racheal Swift [phone]      Equal Access to Services     [facility] : Hadii aad ku hadasho Soomaali, waaxda luqadaha, qaybta kaalmada adeegyada, waxay idiin hayaan adeeg johnnie duran . So [facility] [place] [phone].    ATENCIÓN: Si habla español, tiene a rodrigues disposición servicios gratuitos de asistencia lingüística. [facility] [phone].    We comply with applicable federal civil rights laws and [place] laws. We do not discriminate on the basis of race, color, national origin, age, disability, sex, sexual orientation, or gender identity.            Thank you!     Thank you for choosing [facility] for your care. Our goal is always to provide you with excellent care. Hearing back from our patients is one way we can continue to improve our services. Please take a few minutes to complete the written survey that you may receive in the mail after you visit with us. Thank you!             Medication List: This is a list of all your medications and when to take them. Check marks below indicate your daily home schedule. Keep this list as a reference.      Medications           Morning Afternoon Evening Bedtime As Needed    acetaminophen 500 MG tablet   Commonly known as:  TYLENOL   Take 2 tablets (1,000 mg) by mouth 3 times daily                                ammonium lactate 12 % cream   Commonly known as:  LAC-HYDRIN   Apply topically 2 times daily as needed for dry skin                                ascorbic acid 500 MG Tabs   Take 1 tablet (500 mg) by mouth 2 times daily                                blood glucose monitoring  "lancets   Use to test blood sugars 2 as directed.                                blood glucose monitoring test strip   Commonly known as:  ONETOUCH ULTRA   Use to test blood sugars 2 times daily or as directed.                                Cholecalciferol 3000 UNITS Tabs   Take 3,000 Units by mouth daily                                clindamycin 300 MG capsule   Commonly known as:  CLEOCIN   Take 1 capsule (300 mg) by mouth 3 times daily                                clopidogrel 75 MG tablet   Commonly known as:  PLAVIX   Take 1 tablet (75 mg) by mouth daily                                COLACE PO                                ferrous sulfate 325 (65 FE) MG tablet   Commonly known as:  IRON   Take 1 tablet (325 mg) by mouth 2 times daily                                gentamicin 0.1 % cream   Commonly known as:  GARAMYCIN   Apply topically daily To right leg ulcer.                                hydrocortisone 0.2 % cream   Commonly known as:  WESTCORT   Apply sparingly to affected area twice times daily for 14 days.                                insulin glargine 100 UNIT/ML injection   Commonly known as:  LANTUS SOLOSTAR   Inject 25 Units Subcutaneous At Bedtime                                insulin pen needle 31G X 8 MM   Commonly known as:  B-D U/F   Use 1 daily o as directed                                levofloxacin 750 MG tablet   Commonly known as:  LEVAQUIN   Take 1 tablet (750 mg) by mouth daily                                LISINOPRIL PO   Take 20 mg by mouth 2 times daily                                methocarbamol 750 MG tablet   Commonly known as:  ROBAXIN   Take 1 tablet (750 mg) by mouth 4 times daily                                nateglinide 120 MG tablet   Commonly known as:  STARLIX   TAKE 1 TABLET BY MOUTH THREE TIMES DAILY BEFORE MEALS                                OPTIFOAM 6\"X6\" Pads   1 Box once a week                                * order for DME   Please measure and distribute 1 " pair of 20mm Hg - 30mm Hg knee high ULCER CARE open or closed toe compression stockings.  Patient has a size 13 foot and please take this into consideration.  Jobst or equivalent                                * order for DME   Please measure and distribute 1 pair of 20mmHg - 30mmHg knee high open or closed toe compression stockings. Jobst ultrasheer or equivalent.                                * order for DME   Please measure and distribute 1 pair of 30mmHg - 40mmHg knee high open toe ulcercare compression stockings. Jobst ultrasheer or equivalent.                                oxyCODONE IR 5 MG tablet   Commonly known as:  ROXICODONE   Take 1-2 tablets (5-10 mg) by mouth every 3 hours as needed for moderate to severe pain And scheduled Oxycodone 10 mg at 0800 and 1300 prior to therapies                                senna-docusate 8.6-50 MG per tablet   Commonly known as:  SENOKOT-S;PERICOLACE   Take 2 tablets by mouth 2 times daily as needed for constipation                                sildenafil 50 MG tablet   Commonly known as:  VIAGRA   Take 1 tablet (50 mg) by mouth daily as needed for erectile dysfunction                                * silver sulfADIAZINE 1 % cream   Commonly known as:  SILVADENE   Apply topically daily To affected areas on right foot and leg.                                * SILVADENE 1 % cream   Apply topically daily Dispensed at visit for heel and 5th mt ulcers.   Generic drug:  silver sulfADIAZINE                                simvastatin 10 MG tablet   Commonly known as:  ZOCOR   Take 1 tablet (10 mg) by mouth At Bedtime                                sitagliptin 100 MG tablet   Commonly known as:  JANUVIA   Take 1 tablet (100 mg) by mouth daily                                triamcinolone 0.1 % cream   Commonly known as:  KENALOG   Apply sparingly to left heel daily.                                * Notice:  This list has 5 medication(s) that are the same as other medications  prescribed for you. Read the directions carefully, and ask your doctor or other care provider to review them with you.

## 2017-12-19 NOTE — PLAN OF CARE
Pt arrived to floor with RN from IR, s/p unsuccessful access attempt of femoral artery. VSS. Planned CTA scheduled for this afternoon. 20 gauge PIV placed to left lower forearm. Wife at bedside. BG recheck 90.

## 2017-12-19 NOTE — PROGRESS NOTES
Interventional Radiology Pre-Procedure Sedation Assessment   Time of Assessment: 8:28 AM    Expected Level: Moderate Sedation    Indication: Sedation is required for the following type of Procedure: Arterial    Sedation and procedural consent: Risks, benefits and alternatives were discussed with Patient    PO Intake: Appropriately NPO for procedure    ASA Class: Class 3 - SEVERE SYSTEMIC DISEASE, DEFINITE FUNCTIONAL LIMITATIONS.    Mallampati: Grade 2:  Soft palate, base of uvula, tonsillar pillars, and portion of posterior pharyngeal wall visible    Lungs: Lungs Clear with good breath sounds bilaterally    Heart: Normal heart sounds and rate    History and physical reviewed and no updates needed. I have reviewed the lab findings, diagnostic data, medications, and the plan for sedation. I have determined this patient to be an appropriate candidate for the planned sedation and procedure and have reassessed the patient IMMEDIATELY PRIOR to sedation and procedure.    Rickie Wan MD

## 2017-12-19 NOTE — BRIEF OP NOTE
Interventional Radiology Brief Post Procedure Note    Procedure: Attempted peripheral angiogram    Proceduralist: Lane Jenkins MD    Assistant: Rickie Wan MD    Time Out: Prior to the start of the procedure and with procedural staff participation, I verbally confirmed the patient s identity using two indicators, relevant allergies, that the procedure was appropriate and matched the consent or emergent situation, and that the correct equipment/implants were available. Immediately prior to starting the procedure I conducted the Time Out with the procedural staff and re-confirmed the patient s name, procedure, and site/side. (The Joint Commission universal protocol was followed.)  Yes        Sedation: IR Nurse Monitored Care   Post Procedure Summary:  Prior to the start of the procedure and with procedural staff participation, I verbally confirmed the patient s identity using two indicators, relevant allergies, that the procedure was appropriate and matched the consent or emergent situation, and that the correct equipment/implants were available. Immediately prior to starting the procedure I conducted the Time Out with the procedural staff and re-confirmed the patient s name, procedure, and site/side. (The Joint Commission universal protocol was followed.)  Yes       Sedatives: Fentanyl and Midazolam (Versed)    Vital signs, airway and pulse oximetry were monitored and remained stable throughout the procedure and sedation was maintained until the procedure was complete.  The patient was monitored by staff until sedation discharge criteria were met.    Patient tolerance: Patient tolerated the procedure well with no immediate complications.    Time of sedation in minutes: 45 minutes from beginning to end of physician one to one monitoring.          Findings: Occluded left common femoral artery, unsuccessful access attempt with micropuncture needle only.    Estimated Blood Loss: None    Fluoroscopy Time:   minute(s)    SPECIMENS: None    Complications: 1. None     Condition: Stable    Plan: Patient to 2A for postprocedural cares. Bedrest for 1 hour. CTA runoff ordered, to be obtained prior to discharge.    Comments: See dictated procedure note for full details.    Rickie Wan MD

## 2017-12-22 ENCOUNTER — TELEPHONE (OUTPATIENT)
Dept: INTERVENTIONAL RADIOLOGY/VASCULAR | Facility: CLINIC | Age: 72
End: 2017-12-22

## 2017-12-22 NOTE — TELEPHONE ENCOUNTER
"I called and spoke with Amos.  He states he feels physically fine.  Pt states he is worried about findings from RLE angio procedure and wants to get this \"taken care of\".  I have messaged Dr. Jenkins patients concerns.  HENRIETTA Dorantes RN, BSN  Interventional Radiology Care Coordinator   Phone:  [phone]    "

## 2017-12-29 ENCOUNTER — OFFICE VISIT (OUTPATIENT)
Dept: INTERNAL MEDICINE | Facility: CLINIC | Age: 72
End: 2017-12-29
Payer: MEDICARE

## 2017-12-29 VITALS
HEART RATE: 98 BPM | SYSTOLIC BLOOD PRESSURE: 137 MMHG | OXYGEN SATURATION: 99 % | BODY MASS INDEX: 21.61 KG/M2 | DIASTOLIC BLOOD PRESSURE: 73 MMHG | WEIGHT: 176.4 LBS

## 2017-12-29 DIAGNOSIS — I73.9 PVD (PERIPHERAL VASCULAR DISEASE) (H): ICD-10-CM

## 2017-12-29 DIAGNOSIS — I10 ESSENTIAL HYPERTENSION: ICD-10-CM

## 2017-12-29 DIAGNOSIS — H90.8 MIXED CONDUCTIVE AND SENSORINEURAL HEARING LOSS, UNSPECIFIED LATERALITY: Primary | ICD-10-CM

## 2017-12-29 DIAGNOSIS — Z87.891 PERSONAL HISTORY OF TOBACCO USE, PRESENTING HAZARDS TO HEALTH: ICD-10-CM

## 2017-12-29 DIAGNOSIS — E11.40 TYPE 2 DIABETES, CONTROLLED, WITH NEUROPATHY (H): ICD-10-CM

## 2017-12-29 DIAGNOSIS — Z47.1 AFTERCARE FOLLOWING HIP JOINT REPLACEMENT SURGERY, UNSPECIFIED LATERALITY: ICD-10-CM

## 2017-12-29 DIAGNOSIS — L97.909 ULCER OF LOWER EXTREMITY, UNSPECIFIED LATERALITY, UNSPECIFIED ULCER STAGE (H): ICD-10-CM

## 2017-12-29 DIAGNOSIS — Z96.649 AFTERCARE FOLLOWING HIP JOINT REPLACEMENT SURGERY, UNSPECIFIED LATERALITY: ICD-10-CM

## 2017-12-29 ASSESSMENT — PAIN SCALES - GENERAL: PAINLEVEL: MODERATE PAIN (4)

## 2017-12-29 NOTE — LETTER
Amos DALEY Denise  [address] [facility] [place] [facility] [address]  : 1945  MRN:  [id number]      2017          To Whom It May Concern:      Please allow Amos to return to physical therapy for general strengthening after hip surgery as soon as his wounds are healed and he is medically cleared for PT, which may be at his discretion with the aid of his medical providers.        Thanks,        Racheal Swift MD  Internal Medicine

## 2017-12-29 NOTE — NURSING NOTE
Chief Complaint   Patient presents with     Diabetes     pt here to discuss medications     Recheck Medication     pt would like to discuss medications     Annel Das CMA at 11:29 AM on 12/29/2017.

## 2017-12-29 NOTE — PATIENT INSTRUCTIONS
[facility]: [phone]     [facility] Medication Refill Request Information:  * Please contact your pharmacy regarding ANY request for medication refills.  ** [facility] Prescription Fax = [phone]  * Please allow 3 business days for routine medication refills.  * Please allow 5 business days for controlled substance medication refills.     [facility] Test Result notification information:  *You will be notified with in 7-10 days of your appointment day regarding the results of your test.  If you are on MyChart you will be notified as soon as the provider has reviewed the results and signed off on them.

## 2017-12-29 NOTE — PROGRESS NOTES
"[facility]   Racheal wSift MD  12/29/2017     Chief Complaint:   Diabetes (pt here to discuss medications) and Recheck Medication (pt would like to discuss medications)        History of Present Illness:   Amos Walker is a 72 year old male with a history of PMH of tobacco use, diabetes mellitus, hypertension, chronic kidney disease, peripheral artery disease, and chronic leg ulcers. The patient was last seen in clinic on 10/19/2017. He presents alone for evaluation of diabetes and medication recheck. The patient reports that he had his hip replaced recently and was going through physical therapy. The patient purchased \"hush puppies\" that ended up cutting off a corn on the his right foot. The patient was instructed to hold off on physical therapy until healing occurred. He is requesting a note to restart physical therapy today.     The patient continues to have 3 LE ulcers which have not healed over the last year.  He has been following with Podiatry and recently IR. He did have at [facility] which showed stenotic R SFA and L EIA.  US guided femoral arteriotomy was attempted on 12/19 but the R femoral artery was occluded. The patient is under the impression that vascular surgery would be in contact with him in the near future.     The patient currently has a total of three wounds on his lower extremities, 2 on the right foot and 1 on the left. The patient does have pain and soreness due to his sores, therefore he has been placing silvadene over the wounds. He dresses his wounds himself daily, taking him about 45 minutes a day. The patient expresses that the back of his heels are tender. He did receive orthotic inserts however he experienced discomfort, therefore he purchased Dr. Lopez's inserts with better relief of his symptoms. He expresses concern for possible infection of the sores or just dryness. The patient was referred to radiology due to his slow healing wounds.     In regard to the " patient's diabetes, he notes that he has been maintaining his sugars well with his last blood sugar being 66. The patient denies recent elevated sugars. He notes slight difficulty of maintain his diet throughout the holidays.   Lab Results   Component Value Date    A1C 6.5 10/25/2017    A1C 6.7 06/16/2017    A1C 6.3 02/06/2017    A1C 6.6 10/31/2016    A1C 6.6 06/27/2016       Other Topics Discussed:  1. Lung scan- deferred until after meeting with vascular surgery   2. Audiology- deferred until after meeting with vascular surgery   3. Colonoscopy- to be updated after meeting with vascular surgery      Review of Systems:   A full 10-pt Review of Systems was performed, verified and is negative except as documented in the HPI.  All health questionnaires were reviewed, verified and relevant information documented above.    Active Medications:      triamcinolone (KENALOG) 0.1 % cream, Apply sparingly to left heel daily., Disp: 30 g, Rfl: 1     insulin glargine (LANTUS SOLOSTAR) 100 UNIT/ML injection, Inject 25 Units Subcutaneous At Bedtime, Disp: 15 mL, Rfl: 2     levofloxacin (LEVAQUIN) 750 MG tablet, Take 1 tablet (750 mg) by mouth daily, Disp: 14 tablet, Rfl: 0     clindamycin (CLEOCIN) 300 MG capsule, Take 1 capsule (300 mg) by mouth 3 times daily, Disp: 30 capsule, Rfl: 0     Docusate Sodium (COLACE PO), , Disp: , Rfl:      LISINOPRIL PO, Take 20 mg by mouth 2 times daily, Disp: , Rfl:      silver sulfADIAZINE (SILVADENE) 1 % cream, Apply topically daily Dispensed at visit for heel and 5th mt ulcers., Disp: 20 g, Rfl: 0     oxyCODONE (ROXICODONE) 5 MG IR tablet, Take 1-2 tablets (5-10 mg) by mouth every 3 hours as needed for moderate to severe pain And scheduled Oxycodone 10 mg at 0800 and 1300 prior to therapies, Disp: , Rfl: 0     senna-docusate (SENOKOT-S;PERICOLACE) 8.6-50 MG per tablet, Take 2 tablets by mouth 2 times daily as needed for constipation, Disp: 100 tablet, Rfl:      acetaminophen (TYLENOL) 500 MG  tablet, Take 2 tablets (1,000 mg) by mouth 3 times daily, Disp: , Rfl:      nateglinide (STARLIX) 120 MG tablet, TAKE 1 TABLET BY MOUTH THREE TIMES DAILY BEFORE MEALS, Disp: 90 tablet, Rfl: 11     sitagliptin (JANUVIA) 100 MG tablet, Take 1 tablet (100 mg) by mouth daily, Disp: 90 tablet, Rfl: 3     simvastatin (ZOCOR) 10 MG tablet, Take 1 tablet (10 mg) by mouth At Bedtime, Disp: 90 tablet, Rfl: 3     ammonium lactate (LAC-HYDRIN) 12 % cream, Apply topically 2 times daily as needed for dry skin, Disp: 385 g, Rfl: 3     gentamicin (GARAMYCIN) 0.1 % cream, Apply topically daily To right leg ulcer., Disp: 30 g, Rfl: 5     hydrocortisone (WESTCORT) 0.2 % cream, Apply sparingly to affected area twice times daily for 14 days., Disp: 15 g, Rfl: 3     silver sulfADIAZINE (SILVADENE) 1 % cream, Apply topically daily To affected areas on right foot and leg., Disp: 85 g, Rfl: 5     ferrous sulfate (IRON) 325 (65 FE) MG tablet, Take 1 tablet (325 mg) by mouth 2 times daily, Disp: 60 tablet, Rfl: 11     clopidogrel (PLAVIX) 75 MG tablet, Take 1 tablet (75 mg) by mouth daily, Disp: 30 tablet, Rfl: 11     methocarbamol (ROBAXIN) 750 MG tablet, Take 1 tablet (750 mg) by mouth 4 times daily, Disp: 45 tablet, Rfl:      ascorbic acid 500 MG TABS, Take 1 tablet (500 mg) by mouth 2 times daily, Disp: 30 tablet, Rfl:      cholecalciferol 3000 UNITS TABS, Take 3,000 Units by mouth daily, Disp: 30 tablet, Rfl:      insulin pen needle (B-D U/F) 31G X 8 MM, Use 1 daily o as directed, Disp: 100 each, Rfl: 3     sildenafil (VIAGRA) 50 MG tablet, Take 1 tablet (50 mg) by mouth daily as needed for erectile dysfunction, Disp: 10 tablet, Rfl: 11     Allergies:   Lisinopril  Neomycin  Methylchloroisothiazolinone [methylisothiazolinone]  Povidone iodine      Past Medical History:  Anemia  CKD (chronic kidney disease) stage 3, GFR 30-59 ml/min  HTN (hypertension)  Hyperlipidemia  MRSA cellulitis of right foot  PAD (peripheral artery disease)   PMH  of tobacco use  Type 2 diabetes mellitus  Chronic venous ulcer     Past Surgical History:  Orthopedic surgery x2  Vascular surgery   Arthroplasty hip    Family History:   Colon cancer   Kidney disease   Myocardial infarction   Macular degeneration      Social History:   The patient is , a current everyday smoker of 050 packs per day, and does not consume alcohol. The patient is currently residing in a nursing home.      Physical Exam:   /73  Pulse 98  Wt 80 kg (176 lb 6.4 oz)  SpO2 99%  BMI 21.61 kg/m2      Constitutional: Alert, oriented, pleasant, no acute distress  Head: Normocephalic, atraumatic  Neck: Supple, no lymphadenopathy, no thyromegaly   Cardiovascular: Regular rate and rhythm, no murmurs, rubs or gallops, peripheral pulses full/symmetric  Respiratory: Good air movement bilaterally, lungs clear, no wheezes/rales/rhonchi  Musculoskeletal: No edema, normal muscle tone, normal gait  Neurologic: Alert and oriented, cranial nerves 2-12 intact.  Skin: On the right foot, there was an 8 cm by 1 cm stage 3 ulcer and a 1 cm by 1 cm diameter ulcer over the lateral metatarsal. No erythema, odor, or drainage from either wound. On the left fourth toe, small <1 cm scabbed wound. No rashes.  Psychiatric: normal mentation, affect and mood     Assessment and Plan:  1. Diabetes management  Well controlled. Continue current treatment plan and monitor diet.     2. Ulcerative sores   Examined and re-dressed today.  No signs of infection. Continue dressing at home with use of silvadene. Follow up, with Vascular surgery for potential revascularization and podiatry.    3. S/p hip replacement   Note provided to the patient to return to physical therapy as the patient's wounds are being managed at this time.     4. Lung scan   Imaging deferred until patient is done meeting with vascular surgery per patient request. Reasonable option at this time.     5. Hearing loss  Referral to audiology deferred until patient  is done meeting with vascular surgery per patient request. Reasonable option at this time.     6. Colonoscopy   Deferred until patient is done meeting with vascular surgery per patient request. Reasonable option at this time.     7. Routine Health Maintenance  Immunizations (zoster, pneumovax, flu, Tdap, Hep A/B):   Most Recent Immunizations   Administered Date(s) Administered     Influenza (High Dose) 3 valent vaccine 10/10/2017     Influenza (IIV3) PF 11/01/2013     Pneumo Conj 13-V (2010&after) 11/03/2014     Pneumococcal 23 valent 12/21/2015     TDAP Vaccine (Boostrix) 03/21/2016     Lipids:   Recent Labs   Lab Test  10/25/17   1309  10/31/16   1205   11/18/14   0853   CHOL  92  123   < >  110   HDL  41  41   < >  45   LDL  30  67   < >  45   TRIG  100  74   < >  100   CHOLHDLRATIO   --    --    --   2.4    < > = values in this interval not displayed.     PSA (50-75 yrs): No results found for: PSA  AAA Screening (65-75 yrs): neg 12/16  Lung Ca Screening (>30 pk age 55-79 or >20 py age 50-79 + RF): 12/16: Impression:    1. ACR Assessment Category:  Lung-RADS Category 2. Benign appearance or behavior.       Recommendation:  Lung-RADS Category 2. Benign appearance or behavior.    Recommendation:  continue annual screening. .      2. Significant Incidental Finding(s):  Category S: Yes.    a.  Mild COPD changes      b.  Moderate atherosclerotic calcification of the coronary arteries and major vessels.     Colonoscopy (50-75 yrs): due  Dexa (>65W or 70M yrs): consider future  Safety/Lifestyle: reviewed  Tob/EtOH: discussed smoking, he is not currently motivated  Depression: not reviewed     Follow-up: Return to clinic in as soon as possible to discuss completing a lung scan and a colonoscopy, in addition to providing an audiology referral.         Scribe Disclosure:  Kassi TAPIA, am serving as a scribe to document services personally performed by Racheal Swift MD at this visit, based upon the  provider's statements to me. All documentation has been reviewed by the aforementioned provider prior to being entered into the official medical record.     Portions of this medical record were completed by a scribe. UPON MY REVIEW AND AUTHENTICATION BY ELECTRONIC SIGNATURE, this confirms (a) I performed the applicable clinical services, and (b) the record is accurate.   Racheal Swift MD  Internal Medicine    25 min spent face to face, of which >50% time spent on counseling/coordinating care exclusive of any procedure time

## 2017-12-29 NOTE — MR AVS SNAPSHOT
After Visit Summary   12/29/2017    Amos Walker    MRN: [id number]           Patient Information     Date Of Birth          1945        Visit Information        Provider Department      12/29/2017 11:25 AM Racheal Swift MD [facility]        Care Instructions    [facility]: [phone]     [facility] Medication Refill Request Information:  * Please contact your pharmacy regarding ANY request for medication refills.  ** [facility] Prescription Fax = [phone]  * Please allow 3 business days for routine medication refills.  * Please allow 5 business days for controlled substance medication refills.     [facility] Test Result notification information:  *You will be notified with in 7-10 days of your appointment day regarding the results of your test.  If you are on MyChart you will be notified as soon as the provider has reviewed the results and signed off on them.            Follow-ups after your visit        Follow-up notes from your care team     Return in about 4 months (around 4/29/2018) for Routine Visit.      Your next 10 appointments already scheduled     Jan 02, 2018 10:00 AM CST   Return Visit with Brayan Mcclain DPM   [facility])    [address] [facility] [address]   [phone]            Jan 09, 2018 10:00 AM CST   Return Visit with Brayan Mcclain DPM   [facility])    [address] [facility] [address]   [phone]            Jan 16, 2018 10:30 AM CST   Return Visit with Brayan Mcclain DPM   [facility])    [address] [facility] [address]   [phone]            Jan 23, 2018 10:00 AM CST   Return Visit with Brayan Mcclain DPM   [facility])    [address] [facility] [address]    [phone]            Jan 30, 2018 10:00 AM CST   [facility]ray[facility]clain DPM   [facility]    [address] [facility] [address]   [phone]            Feb 06, 2018  9:30 AM CST   Return Visit [facility] Bray[facility] Mcclain DPM   [facility]    [address] [facility] [address]   [phone]            Apr 27, 2018 10:25 AM CDT   (Arrive by 10:10 AM)   Return Visit with Racheal Swift MD   [facility] ([facility])    [address]  4th [address]  [facility] [address] [phone]            Jun 20, 2018 10:30 AM CDT   RETURN RETINA with Jen Aranda MD   [facility] ([facility])    [address]  [facility] [id number] [phone]              Who to contact     Please call your clinic at [phone] to:    Ask questions about your health    Make or cancel appointments    Discuss your medicines    Learn about your test results    Speak to your doctor   If you have compliments or concerns about an experience at your clinic, or if you wish to file a complaint, please contact [facility] Patient Relations at [phone] or email us at [email or web address]         Additional Information About Your Visit        [employer] Information     [employer] gives you secure access to your electronic health record. If you see a primary care provider, you can also send messages to your care team and make appointments. If you have questions, please call your primary care clinic.  If you do not have a primary care provider, please call [phone] and they will assist you.      [employer] is an electronic gateway that provides easy, online access to your medical records. With [employer], you can request a clinic appointment, read your test  results, renew a prescription or communicate with your care team.     To access your existing account, please contact your [facility] or call [phone] for assistance.        Care EveryWhere ID     This is your Care EveryWhere ID. This could be used by other organizations to access your [place] medical records  [id number]        Your Vitals Were     Pulse Pulse Oximetry BMI (Body Mass Index)             98 99% 21.61 kg/m2          Blood Pressure from Last 3 Encounters:   12/29/17 137/73   12/19/17 128/68   12/12/17 133/63    Weight from Last 3 Encounters:   12/29/17 80 kg (176 lb 6.4 oz)   10/19/17 83.5 kg (184 lb)   10/10/17 81.2 kg (179 lb)              Today, you had the following     No orders found for display       Primary Care Provider Office Phone # Fax #    Racheal Swift [phone]       [address] [place] [address] [address] [place] [address]        Equal Access to Services     SAMSON MELTON : Hadii aad ku hadasho Soomaali, waaxda luqadaha, qaybta kaalmada adeegyada, waxay geniin hayhollie duran . So [facility] [phone].    ATENCIÓN: Si habla español, tiene a rodrigues disposición servicios gratuitos de asistencia lingüística. [facility] [phone].    We comply with applicable federal civil rights laws and [place] laws. We do not discriminate on the basis of race, color, national origin, age, disability, sex, sexual orientation, or gender identity.            Thank you!     Thank you for choosing [facility]  for your care. Our goal is always to provide you with excellent care. Hearing back from our patients is one way we can continue to improve our services. Please take a few minutes to complete the written survey that you may receive in the mail after your visit with us. Thank you!             Your Updated Medication List - Protect others around you: Learn how to safely use, store and throw away your medicines at  www.disposemymeds.org.          This list is accurate as of: 12/29/17 12:16 PM.  Always use your most recent med list.                   Brand Name Dispense Instructions for use Diagnosis    acetaminophen 500 MG tablet    TYLENOL     Take 2 tablets (1,000 mg) by mouth 3 times daily    Closed fracture of neck of right femur, initial encounter (H)       ammonium lactate 12 % cream    LAC-HYDRIN    385 g    Apply topically 2 times daily as needed for dry skin    Venous stasis, Type 2 diabetes, controlled, with neuropathy (H)       ascorbic acid 500 MG Tabs     30 tablet    Take 1 tablet (500 mg) by mouth 2 times daily    Ulcer of right lower leg, with fat layer exposed (H)       blood glucose monitoring lancets     3 Box    Use to test blood sugars 2 as directed.    Type 2 diabetes, uncontrolled, with neuropathy (H)       blood glucose monitoring test strip    ONETOUCH ULTRA    60 strip    Use to test blood sugars 2 times daily or as directed.    Type 2 diabetes mellitus (H)       Cholecalciferol 3000 UNITS Tabs     30 tablet    Take 3,000 Units by mouth daily    Closed fracture of neck of right femur, initial encounter (H)       clopidogrel 75 MG tablet    PLAVIX    30 tablet    Take 1 tablet (75 mg) by mouth daily    Peripheral vascular disease, unspecified       COLACE PO           ferrous sulfate 325 (65 FE) MG tablet    IRON    60 tablet    Take 1 tablet (325 mg) by mouth 2 times daily    Peripheral vascular disease, unspecified       gentamicin 0.1 % cream    GARAMYCIN    30 g    Apply topically daily To right leg ulcer.    Ulcer of right lower leg, with fat layer exposed (H), Chronic venous hypertension with ulcer involving right side (H), Type 2 diabetes, controlled, with neuropathy (H)       hydrocortisone 0.2 % cream    WESTCORT    15 g    Apply sparingly to affected area twice times daily for 14 days.    Dermatitis       insulin glargine 100 UNIT/ML injection    LANTUS SOLOSTAR    15 mL    Inject 25 Units  "Subcutaneous At Bedtime    Type 2 diabetes mellitus with diabetic peripheral angiopathy without gangrene, with long-term current use of insulin (H)       insulin pen needle 31G X 8 MM    B-D U/F    100 each    Use 1 daily o as directed    Diabetes mellitus, type II (H)       LISINOPRIL PO      Take 20 mg by mouth 2 times daily        methocarbamol 750 MG tablet    ROBAXIN    45 tablet    Take 1 tablet (750 mg) by mouth 4 times daily    Closed fracture of neck of right femur, initial encounter (H)       nateglinide 120 MG tablet    STARLIX    90 tablet    TAKE 1 TABLET BY MOUTH THREE TIMES DAILY BEFORE MEALS    Type 2 diabetes, controlled, with neuropathy (H)       OPTIFOAM 6\"X6\" Pads     1 each    1 Box once a week    Ulcer of right leg, with fat layer exposed (H)       * order for DME     2 each    Please measure and distribute 1 pair of 20mm Hg - 30mm Hg knee high ULCER CARE open or closed toe compression stockings.  Patient has a size 13 foot and please take this into consideration.  Jobst or equivalent    Varicose veins of lower extremities with other complications, Venous stasis ulcer of right lower extremity (H)       * order for DME     3 each    Please measure and distribute 1 pair of 20mmHg - 30mmHg knee high open or closed toe compression stockings. Jobst ultrasheer or equivalent.    Varicose veins of both lower extremities with complications       * order for DME     2 each    Please measure and distribute 1 pair of 30mmHg - 40mmHg knee high open toe ulcercare compression stockings. Jobst ultrasheer or equivalent.    Varicose veins of bilateral lower extremities with other complications       oxyCODONE IR 5 MG tablet    ROXICODONE     Take 1-2 tablets (5-10 mg) by mouth every 3 hours as needed for moderate to severe pain And scheduled Oxycodone 10 mg at 0800 and 1300 prior to therapies    Closed fracture of neck of right femur, initial encounter (H)       senna-docusate 8.6-50 MG per tablet    " SENOKOT-S;PERICOLACE    100 tablet    Take 2 tablets by mouth 2 times daily as needed for constipation    Constipation, unspecified constipation type       sildenafil 50 MG tablet    VIAGRA    10 tablet    Take 1 tablet (50 mg) by mouth daily as needed for erectile dysfunction    Vasculogenic erectile dysfunction, unspecified vasculogenic erectile dysfunction type       * silver sulfADIAZINE 1 % cream    SILVADENE    85 g    Apply topically daily To affected areas on right foot and leg.    Ulcer of right lower leg, with fat layer exposed (H), Chronic venous hypertension with ulcer involving right side (H), Type 2 diabetes, controlled, with neuropathy (H)       * SILVADENE 1 % cream   Generic drug:  silver sulfADIAZINE     20 g    Apply topically daily Dispensed at visit for heel and 5th mt ulcers.        simvastatin 10 MG tablet    ZOCOR    90 tablet    Take 1 tablet (10 mg) by mouth At Bedtime    Type 2 diabetes, controlled, with neuropathy (H)       sitagliptin 100 MG tablet    JANUVIA    90 tablet    Take 1 tablet (100 mg) by mouth daily    Type 2 diabetes, controlled, with neuropathy (H)       triamcinolone 0.1 % cream    KENALOG    30 g    Apply sparingly to left heel daily.    Dermatitis of left foot       * Notice:  This list has 5 medication(s) that are the same as other medications prescribed for you. Read the directions carefully, and ask your doctor or other care provider to review them with you.

## 2018-01-02 ENCOUNTER — OFFICE VISIT (OUTPATIENT)
Dept: PODIATRY | Facility: CLINIC | Age: 73
End: 2018-01-02
Payer: MEDICARE

## 2018-01-02 VITALS — SYSTOLIC BLOOD PRESSURE: 143 MMHG | HEART RATE: 109 BPM | DIASTOLIC BLOOD PRESSURE: 72 MMHG | OXYGEN SATURATION: 100 %

## 2018-01-02 DIAGNOSIS — L97.912 ULCER OF RIGHT LEG, WITH FAT LAYER EXPOSED (H): ICD-10-CM

## 2018-01-02 DIAGNOSIS — E11.49 TYPE II OR UNSPECIFIED TYPE DIABETES MELLITUS WITH NEUROLOGICAL MANIFESTATIONS, NOT STATED AS UNCONTROLLED(250.60) (H): ICD-10-CM

## 2018-01-02 DIAGNOSIS — L97.512 SKIN ULCER OF RIGHT FOOT WITH FAT LAYER EXPOSED (H): Primary | ICD-10-CM

## 2018-01-02 DIAGNOSIS — E11.51 DIABETES MELLITUS WITH PERIPHERAL VASCULAR DISEASE (H): ICD-10-CM

## 2018-01-02 PROCEDURE — 97597 DBRDMT OPN WND 1ST 20 CM/<: CPT | Performed by: PODIATRIST

## 2018-01-02 NOTE — MR AVS SNAPSHOT
After Visit Summary   1/2/2018    Amos Walker    MRN: [id number]           Patient Information     Date Of Birth          1945        Visit Information        Provider Department      1/2/2018 10:00 AM Brayan Mcclain DPM [facility]        Today's Diagnoses     Skin ulcer of right foot with fat layer exposed (H)    -  1    Ulcer of right leg, with fat layer exposed (H)        Type II or unspecified type diabetes mellitus with neurological manifestations, not stated as uncontrolled(250.60) (H)        Diabetes mellitus with peripheral vascular disease (H)          Care Instructions    Thanks for coming today.  Ortho/Sports Medicine Clinic  [address] [place] [address]    To schedule future appointments in Ortho Clinic, you may call [phone].    To schedule ordered imaging by your Provider: Call [facility] at [phone]    Return Path available online at:   [email or web address]    Please call if any further questions or concerns [phone] and ask for the Orthopedic Department. Clinic hours 8 am to 5 pm.    Return to clinic if symptoms worsen.            Follow-ups after your visit        Your next 10 appointments already scheduled     Jan 09, 2018 10:00 AM CST   Return Visit with Brayan Mcclain DPM   [facility] ([facility])    [address] [facility] [id number] [phone]            Jan 16, 2018 10:30 AM CST   Return Visit with Brayan Mcclain DPM   [facility] ([facility])    [address] [facility] [id number] [phone]            Jan 23, 2018 10:00 AM CST   Return Visit with Brayan Mcclain DPM   [facility] ([facility])    [address] [facility] [address] [phone]            Jan 30, 2018 10:00 AM CST   Return Visit with Brayan Mcclain DPM   [facility] ([facility])    [address] [place] [address]   [phone]            Feb 06, 2018  9:30 AM CST   Return Visit with Brayan Mcclain DPM   [facility] ([facility])    [address] [place] [address]   [phone]            Apr 27, 2018 10:25 AM CDT   (Arrive by 10:10 AM)   Return Visit with Racheal Swift MD   [facility] ([facility])    [address] [facility]  4th Floor  [facility] [id number] [phone]            Jun 20, 2018 10:30 AM CDT   RETURN RETINA with Jen Aranda MD   [facility] ([facility])    [address] [facility]  [address] [address]  [facility] [id number] [phone]              Who to contact     If you have questions or need follow up information about today's clinic visit or your schedule please contact [facility] directly at [phone].  Normal or non-critical lab and imaging results will be communicated to you by Tank Top TVhart, letter or phone within 4 business days after the clinic has received the results. If you do not hear from us within 7 days, please contact the clinic through Tank Top TVhart or phone. If you have a critical or abnormal lab result, we will notify you by phone as soon as possible.  Submit refill requests through Wit studio or call your pharmacy and they will forward the refill request to us. Please allow 3 business days for your refill to be completed.          Additional Information About Your Visit        MyChart Information     Wit studio gives you secure access to your electronic health record. If you see a primary care provider, you can also send messages to your care team and make appointments. If you have questions, please call your primary care clinic.  If you do not have a primary care provider, please call [phone] and they will assist you.      Wit studio is an electronic gateway that  provides easy, online access to your medical records. With [employer], you can request a clinic appointment, read your test results, renew a prescription or communicate with your care team.     To access your existing account, please contact your [facility] or call [phone] for assistance.        Care EveryWhere ID     This is your Care EveryWhere ID. This could be used by other organizations to access your [place] medical records  [id number]        Your Vitals Were     Pulse Pulse Oximetry                109 100%           Blood Pressure from Last 3 Encounters:   01/02/18 143/72   12/29/17 137/73   12/19/17 128/68    Weight from Last 3 Encounters:   12/29/17 80 kg (176 lb 6.4 oz)   10/19/17 83.5 kg (184 lb)   10/10/17 81.2 kg (179 lb)              We Performed the Following     DEBRIDEMENT WOUND UP TO 20 SQ CM        Primary Care Provider Office Phone # Fax #    MobileDionne Swift [phone]       [address] [place] [address] [address] [place] [address]        Equal Access to Services     [facility]: Hadii aad ku hadasho Soomaali, waaxda luqadaha, qaybta kaalmada adeegyada, waxanna duran . So [facility] [phone].    ATENCIÓN: Si habla español, tiene a rodrigues disposición servicios gratuitos de asistencia lingüística. Llame al [phone].    We comply with applicable federal civil rights laws and [place] laws. We do not discriminate on the basis of race, color, national origin, age, disability, sex, sexual orientation, or gender identity.            Thank you!     Thank you for choosing [facility]  for your care. Our goal is always to provide you with excellent care. Hearing back from our patients is one way we can continue to improve our services. Please take a few minutes to complete the written survey that you may receive in the mail after your visit with us. Thank you!             Your Updated Medication List - Protect  others around you: Learn how to safely use, store and throw away your medicines at www.disposemymeds.org.          This list is accurate as of: 1/2/18 12:56 PM.  Always use your most recent med list.                   Brand Name Dispense Instructions for use Diagnosis    acetaminophen 500 MG tablet    TYLENOL     Take 2 tablets (1,000 mg) by mouth 3 times daily    Closed fracture of neck of right femur, initial encounter (H)       ammonium lactate 12 % cream    LAC-HYDRIN    385 g    Apply topically 2 times daily as needed for dry skin    Venous stasis, Type 2 diabetes, controlled, with neuropathy (H)       ascorbic acid 500 MG Tabs     30 tablet    Take 1 tablet (500 mg) by mouth 2 times daily    Ulcer of right lower leg, with fat layer exposed (H)       blood glucose monitoring lancets     3 Box    Use to test blood sugars 2 as directed.    Type 2 diabetes, uncontrolled, with neuropathy (H)       blood glucose monitoring test strip    ONETOUCH ULTRA    60 strip    Use to test blood sugars 2 times daily or as directed.    Type 2 diabetes mellitus (H)       Cholecalciferol 3000 UNITS Tabs     30 tablet    Take 3,000 Units by mouth daily    Closed fracture of neck of right femur, initial encounter (H)       clopidogrel 75 MG tablet    PLAVIX    30 tablet    Take 1 tablet (75 mg) by mouth daily    Peripheral vascular disease, unspecified       COLACE PO           ferrous sulfate 325 (65 FE) MG tablet    IRON    60 tablet    Take 1 tablet (325 mg) by mouth 2 times daily    Peripheral vascular disease, unspecified       gentamicin 0.1 % cream    GARAMYCIN    30 g    Apply topically daily To right leg ulcer.    Ulcer of right lower leg, with fat layer exposed (H), Chronic venous hypertension with ulcer involving right side (H), Type 2 diabetes, controlled, with neuropathy (H)       hydrocortisone 0.2 % cream    WESTCORT    15 g    Apply sparingly to affected area twice times daily for 14 days.    Dermatitis       insulin  "glargine 100 UNIT/ML injection    LANTUS SOLOSTAR    15 mL    Inject 25 Units Subcutaneous At Bedtime    Type 2 diabetes mellitus with diabetic peripheral angiopathy without gangrene, with long-term current use of insulin (H)       insulin pen needle 31G X 8 MM    B-D U/F    100 each    Use 1 daily o as directed    Diabetes mellitus, type II (H)       methocarbamol 750 MG tablet    ROBAXIN    45 tablet    Take 1 tablet (750 mg) by mouth 4 times daily    Closed fracture of neck of right femur, initial encounter (H)       nateglinide 120 MG tablet    STARLIX    90 tablet    TAKE 1 TABLET BY MOUTH THREE TIMES DAILY BEFORE MEALS    Type 2 diabetes, controlled, with neuropathy (H)       OPTIFOAM 6\"X6\" Pads     1 each    1 Box once a week    Ulcer of right leg, with fat layer exposed (H)       * order for DME     2 each    Please measure and distribute 1 pair of 20mm Hg - 30mm Hg knee high ULCER CARE open or closed toe compression stockings.  Patient has a size 13 foot and please take this into consideration.  Jobst or equivalent    Varicose veins of lower extremities with other complications, Venous stasis ulcer of right lower extremity (H)       * order for DME     3 each    Please measure and distribute 1 pair of 20mmHg - 30mmHg knee high open or closed toe compression stockings. Jobst ultrasheer or equivalent.    Varicose veins of both lower extremities with complications       * order for DME     2 each    Please measure and distribute 1 pair of 30mmHg - 40mmHg knee high open toe ulcercare compression stockings. Jobst ultrasheer or equivalent.    Varicose veins of bilateral lower extremities with other complications       oxyCODONE IR 5 MG tablet    ROXICODONE     Take 1-2 tablets (5-10 mg) by mouth every 3 hours as needed for moderate to severe pain And scheduled Oxycodone 10 mg at 0800 and 1300 prior to therapies    Closed fracture of neck of right femur, initial encounter (H)       senna-docusate 8.6-50 MG per " tablet    SENOKOT-S;PERICOLACE    100 tablet    Take 2 tablets by mouth 2 times daily as needed for constipation    Constipation, unspecified constipation type       sildenafil 50 MG tablet    VIAGRA    10 tablet    Take 1 tablet (50 mg) by mouth daily as needed for erectile dysfunction    Vasculogenic erectile dysfunction, unspecified vasculogenic erectile dysfunction type       * silver sulfADIAZINE 1 % cream    SILVADENE    85 g    Apply topically daily To affected areas on right foot and leg.    Ulcer of right lower leg, with fat layer exposed (H), Chronic venous hypertension with ulcer involving right side (H), Type 2 diabetes, controlled, with neuropathy (H)       * SILVADENE 1 % cream   Generic drug:  silver sulfADIAZINE     20 g    Apply topically daily Dispensed at visit for heel and 5th mt ulcers.        simvastatin 10 MG tablet    ZOCOR    90 tablet    Take 1 tablet (10 mg) by mouth At Bedtime    Type 2 diabetes, controlled, with neuropathy (H)       sitagliptin 100 MG tablet    JANUVIA    90 tablet    Take 1 tablet (100 mg) by mouth daily    Type 2 diabetes, controlled, with neuropathy (H)       triamcinolone 0.1 % cream    KENALOG    30 g    Apply sparingly to left heel daily.    Dermatitis of left foot       * Notice:  This list has 5 medication(s) that are the same as other medications prescribed for you. Read the directions carefully, and ask your doctor or other care provider to review them with you.
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
## 2018-01-02 NOTE — PATIENT INSTRUCTIONS
Thanks for coming today.  Ortho/Sports Medicine Clinic  [address] [place] [address]    To schedule future appointments in Ortho Clinic, you may call [phone].    To schedule ordered imaging by your Provider: Call [facility] at [phone]    [employer] available online at:   [email or web address]    Please call if any further questions or concerns [phone] and ask for the Orthopedic Department. Clinic hours 8 am to 5 pm.    Return to clinic if symptoms worsen.

## 2018-01-02 NOTE — NURSING NOTE
"Amos Walker's goals for this visit include: Leg wound check   He requests these members of his care team be copied on today's visit information: yes    PCP: Rahceal Swift    Referring Provider:  Referred Self, MD  No address on file    Chief Complaint   Patient presents with     RECHECK     wound check        Initial /72  Pulse 109  SpO2 100% Estimated body mass index is 21.61 kg/(m^2) as calculated from the following:    Height as of 10/19/17: 1.924 m (6' 3.75\").    Weight as of 12/29/17: 80 kg (176 lb 6.4 oz).  Medication Reconciliation: complete    "

## 2018-01-04 DIAGNOSIS — I73.9 PAD (PERIPHERAL ARTERY DISEASE) (H): Primary | ICD-10-CM

## 2018-01-04 DIAGNOSIS — I70.212 ATHEROSCLEROSIS OF NATIVE ARTERY OF LEFT LOWER EXTREMITY WITH INTERMITTENT CLAUDICATION (H): ICD-10-CM

## 2018-01-05 ENCOUNTER — TELEPHONE (OUTPATIENT)
Dept: INTERVENTIONAL RADIOLOGY/VASCULAR | Facility: CLINIC | Age: 73
End: 2018-01-05

## 2018-01-05 NOTE — LETTER
January 5, 2018    Amos Walker  [address] [place] [facility] [address]    Appointment Reminder:      Your Stress echo MRI has been scheduled for Tuesday, January 23rd @ 115 pm.    Please arrive 15 minutes early.     No caffeine 12 hours prior.  No food or drink 2 hours prior.  The Stress MRI check in to the [facility] waiting room, [facility], [address].    Appointment Reminder:  Your PAC's (pre-op assessment center) visit has been scheduled for Monday, January 22nd at 9 am at the [facility],  4th floor.  This should take about 1.5 to 2 hours.      To check in for this go to the [facility] [place] building located at [address] [place] [address].     Please feel free to call me with any questions or concerns as this date approaches.    Sincerely,    Elba Dorantes, RN, BSN  Interventional Radiology Care Coordinator  Office: [phone]

## 2018-01-05 NOTE — TELEPHONE ENCOUNTER
I called and spoke with Amos.  I have him scheduled for PAC's visit and Stress echo MRI 1/22 and 1/23/18 and have sent a letter with appt details.  I have contacted Dr. Stock's nurse to let her know about patient.  Pt to be scheduled with Dr. Stock and Dr. Jenkins for case in OR. Pt updated and all questions answered at this time.  HENRIETTA Dorantes RN, BSN  Interventional Radiology Care Coordinator   Phone:  [phone]

## 2018-01-09 ENCOUNTER — OFFICE VISIT (OUTPATIENT)
Dept: PODIATRY | Facility: CLINIC | Age: 73
End: 2018-01-09
Payer: MEDICARE

## 2018-01-09 VITALS — OXYGEN SATURATION: 98 % | HEART RATE: 94 BPM | DIASTOLIC BLOOD PRESSURE: 54 MMHG | SYSTOLIC BLOOD PRESSURE: 118 MMHG

## 2018-01-09 DIAGNOSIS — E11.51 DIABETES MELLITUS WITH PERIPHERAL VASCULAR DISEASE (H): ICD-10-CM

## 2018-01-09 DIAGNOSIS — L97.522 SKIN ULCER OF LEFT FOOT WITH FAT LAYER EXPOSED (H): ICD-10-CM

## 2018-01-09 DIAGNOSIS — E11.49 TYPE II OR UNSPECIFIED TYPE DIABETES MELLITUS WITH NEUROLOGICAL MANIFESTATIONS, NOT STATED AS UNCONTROLLED(250.60) (H): ICD-10-CM

## 2018-01-09 DIAGNOSIS — L97.922 SKIN ULCER OF LEFT LOWER LEG WITH FAT LAYER EXPOSED (H): Primary | ICD-10-CM

## 2018-01-09 PROCEDURE — 97597 DBRDMT OPN WND 1ST 20 CM/<: CPT | Performed by: PODIATRIST

## 2018-01-09 NOTE — PATIENT INSTRUCTIONS
Thanks for coming today.  Ortho/Sports Medicine Clinic  [address] [place] [address]    To schedule future appointments in Ortho Clinic, you may call [phone].    To schedule ordered imaging by your Provider: Call [facility] at [phone]    [employer] available online at:   [email or web address]    Please call if any further questions or concerns [phone] and ask for the Orthopedic Department. Clinic hours 8 am to 5 pm.    Return to clinic if symptoms worsen.

## 2018-01-09 NOTE — TELEPHONE ENCOUNTER
Dr. Mcclain would like to recheck coverage using codes below for Puraply, apligraf and Nusheild please. Thank you!    SHANNEN Howard
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
## 2018-01-09 NOTE — MR AVS SNAPSHOT
After Visit Summary   1/9/2018    Amos Walker    MRN: [id number]           Patient Information     Date Of Birth          1945        Visit Information        Provider Department      1/9/2018 10:00 AM Brayan Mcclain DPM [facility]        Today's Diagnoses     Skin ulcer of left lower leg with fat layer exposed (H)    -  1    Skin ulcer of left foot with fat layer exposed (H)        Type II or unspecified type diabetes mellitus with neurological manifestations, not stated as uncontrolled(250.60) (H)        Diabetes mellitus with peripheral vascular disease (H)          Care Instructions    Thanks for coming today.  Ortho[facility] Medicine Clinic  [address] [place] [address]    To schedule future appointments in Ortho Clinic, you may call [phone].    To schedule ordered imaging by your Provider: Call [facility] at [phone]    POSLavu available online at:   [email or web address]    Please call if any further questions or concerns [phone] and ask for the Orthopedic Department. Clinic hours 8 am to 5 pm.    Return to clinic if symptoms worsen.            Follow-ups after your visit        Your next 10 appointments already scheduled     Jan 16, 2018 10:30 AM CST   Return Visit with Brayan Mcclain DPM   [facility] ([facility])    [address] [place] [address] [phone]            Jan 22, 2018  9:00 AM CST   (Arrive by 8:45 AM)   PAC EVALUATION with Uc Pac Jef 7   [facility] ([facility] [place])    [address] [place] [address] [phone]            Jan 22, 2018 10:00 AM CST   (Arrive by 9:45 AM)   PAC RN ASSESSMENT with Uc Pac Rn   [facility] ([facility] [place])    [address] [place] [address] [phone]            Jan 22,  2018 10:20 AM CST   (Arrive by 10:05 AM)   PAC Anesthesia Consult with  Pac Anesthesiologist   [facility] ([facility] [place])    [address]  4th Floor  [facility] [id number] [phone]            Jan 23, 2018  1:15 PM CST   (Arrive by 1:00 PM)   MR CARDIAC W CONTRAST AND STRESS with UUMR4, UU CV MR NURSE   [facility] [place] ([facility])    [address] [facility] [id number] [phone]           Take your medicines as usual, unless your doctor tells you not to.   If you take Viagra, Levitra or Cialis, stop taking it 48 hours before your test.   If you take Aggrenox or dipyridamole (Persantine, Permole), stop taking it 48 hours before your test.   If you take Theophylline or Aminophylline, stop taking it 12 hours before your test.   If you are diabetic and take oral hypoglycemics, do not take them on the day of your test.  The day before your exam, drink extra fluids at least six 8-ounce glasses (unless your doctor wants you to limit your fluids).  Stop all caffeine 12 hours before the test. This includes coffee, tea, soda, chocolate and certain medicines (such as Anacin, Excedrin and NoDoz). Also avoid decaf coffee and tea, as these contain small amounts of caffeine.  Do not eat or drink for 3 hours before your exam. You may drink water and take your morning medicines.  You may need a blood test (creatinine test) within 30 days of your exam. Follow your doctor s orders if this is arranged before your exam.  If you are very claustrophobic, please let you doctor know. You may get a sedative medicine from your doctor before you arrive. Bring the medicine to the exam. Do not take it at home. Arrive one hour early. Bring someone who can take you home after the test. Your medicine will make you sleepy. After the exam, you may not drive, take a bus or take a taxi by yourself.  The MRI  machine uses a strong magnet. Please wear clothes without metal (snaps, zippers). A sweatsuit works well, or we may give you a hospital gown.  Please remove any body piercings and hair extensions before you arrive. You will remove watches, jewelry, hairpins, wallets, dentures, partial dental plates and hearing aids. You may wear contact lenses, and you may be able to wear your rings. We have a safe place to keep your personal items, but it is safer to leave them at home.   **IMPORTANT** THE INSTRUCTIONS BELOW ARE ONLY FOR THOSE PATIENTS WHO HAVE BEEN TOLD THEY WILL RECEIVE SEDATION OR GENERAL ANESTHESIA DURING THEIR MRI PROCEDURE:  IF YOU WILL RECEIVE SEDATION (take medicine to help you relax during your exam):   You must get the medicine from your doctor before you arrive. Bring the medicine to the exam. Do not take it at home.   Arrive one hour early. Bring someone who can take you home after the test. Your medicine will make you sleepy. After the exam, you may not drive, take a bus or take a taxi by yourself.   No eating 8 hours before your exam. You may have clear liquids up until 4 hours before your exam. (Clear liquids include water, clear tea, black coffee and fruit juice without pulp.)  IF YOU WILL RECEIVE ANESTHESIA (be asleep for your exam):   Arrive 1 1/2 hours early. Bring someone who can take you home after the test. You may not drive, take a bus or take a taxi by yourself.   No eating 8 hours before your exam. You may have clear liquids up until 4 hours before your exam. (Clear liquids include water, clear tea, black coffee and fruit juice without pulp.)  If you have any questions, please contact your Imaging Department exam site.            Jan 26, 2018 10:00 AM CST   Return Visit with Brayan Mcclain DPM   [facility] ([facility])    [address] [place] [address] [phone]            Jan 30, 2018 10:00 AM CST   Return Visit with Brayan  DONNELL Mcclain   [facility]    [address] [address] [place] [address] [phone]            Feb 01, 2018  1:30 PM CST   (Arrive by 1:15 PM)   New Vascular Visit with Anita Stock MD   [facility] ([facility] [place])    [address]  [address] [facility] [address]   [phone]            Feb 06, 2018  9:30 AM CST   Return Visit with Brayan Mcclain DPM   [facility]    [address] [place] [address]   [phone]              Who to contact     If you have questions or need follow up information about today's clinic visit or your schedule please contact [facility] directly at [phone].  Normal or non-critical lab and imaging results will be communicated to you by Cloud Theoryhart, letter or phone within 4 business days after the clinic has received the results. If you do not hear from us within 7 days, please contact the clinic through Cloud Theoryhart or phone. If you have a critical or abnormal lab result, we will notify you by phone as soon as possible.  Submit refill requests through Liquid Spins or call your pharmacy and they will forward the refill request to us. Please allow 3 business days for your refill to be completed.          Additional Information About Your Visit        Cloud TheoryharTesaris Information     Liquid Spins gives you secure access to your electronic health record. If you see a primary care provider, you can also send messages to your care team and make appointments. If you have questions, please call your primary care clinic.  If you do not have a primary care provider, please call [phone] and they will assist you.      Liquid Spins is an electronic gateway that provides easy, online access to your medical records. With Liquid Spins, you can request a clinic appointment, read your test results, renew a prescription or communicate with your care  team.     To access your existing account, please contact your [facility] or call [phone] for assistance.        Care EveryWhere ID     This is your Care EveryWhere ID. This could be used by other organizations to access your [place] medical records  [id number]        Your Vitals Were     Pulse Pulse Oximetry                94 98%           Blood Pressure from Last 3 Encounters:   01/09/18 118/54   01/02/18 143/72   12/29/17 137/73    Weight from Last 3 Encounters:   12/29/17 80 kg (176 lb 6.4 oz)   10/19/17 83.5 kg (184 lb)   10/10/17 81.2 kg (179 lb)              We Performed the Following     DEBRIDEMENT WOUND UP TO 20 SQ CM        Primary Care Provider Office Phone # Fax #    Racheal Swift [phone]       [address] [place] [address]  [facility] [address]        Equal Access to Services     SAMSON MLETON : Hadii aad ku hadasho Soomaali, waaxda luqadaha, qaybta kaalmada adeegyada, waxay idiin hayalann zane duran . So [facility] [phone].    ATENCIÓN: Si habla español, tiene a rodrigues disposición servicios gratuitos de asistencia lingüística. Mary al [phone].    We comply with applicable federal civil rights laws and [place] laws. We do not discriminate on the basis of race, color, national origin, age, disability, sex, sexual orientation, or gender identity.            Thank you!     Thank you for choosing [facility]  for your care. Our goal is always to provide you with excellent care. Hearing back from our patients is one way we can continue to improve our services. Please take a few minutes to complete the written survey that you may receive in the mail after your visit with us. Thank you!             Your Updated Medication List - Protect others around you: Learn how to safely use, store and throw away your medicines at www.disposemymeds.org.          This list is accurate as of: 1/9/18 12:10 PM.  Always use your most  recent med list.                   Brand Name Dispense Instructions for use Diagnosis    acetaminophen 500 MG tablet    TYLENOL     Take 2 tablets (1,000 mg) by mouth 3 times daily    Closed fracture of neck of right femur, initial encounter (H)       ammonium lactate 12 % cream    LAC-HYDRIN    385 g    Apply topically 2 times daily as needed for dry skin    Venous stasis, Type 2 diabetes, controlled, with neuropathy (H)       ascorbic acid 500 MG Tabs     30 tablet    Take 1 tablet (500 mg) by mouth 2 times daily    Ulcer of right lower leg, with fat layer exposed (H)       blood glucose monitoring lancets     3 Box    Use to test blood sugars 2 as directed.    Type 2 diabetes, uncontrolled, with neuropathy (H)       blood glucose monitoring test strip    ONETOUCH ULTRA    60 strip    Use to test blood sugars 2 times daily or as directed.    Type 2 diabetes mellitus (H)       Cholecalciferol 3000 UNITS Tabs     30 tablet    Take 3,000 Units by mouth daily    Closed fracture of neck of right femur, initial encounter (H)       clopidogrel 75 MG tablet    PLAVIX    30 tablet    Take 1 tablet (75 mg) by mouth daily    Peripheral vascular disease, unspecified       COLACE PO           ferrous sulfate 325 (65 FE) MG tablet    IRON    60 tablet    Take 1 tablet (325 mg) by mouth 2 times daily    Peripheral vascular disease, unspecified       gentamicin 0.1 % cream    GARAMYCIN    30 g    Apply topically daily To right leg ulcer.    Ulcer of right lower leg, with fat layer exposed (H), Chronic venous hypertension with ulcer involving right side (H), Type 2 diabetes, controlled, with neuropathy (H)       hydrocortisone 0.2 % cream    WESTCORT    15 g    Apply sparingly to affected area twice times daily for 14 days.    Dermatitis       insulin glargine 100 UNIT/ML injection    LANTUS SOLOSTAR    15 mL    Inject 25 Units Subcutaneous At Bedtime    Type 2 diabetes mellitus with diabetic peripheral angiopathy without  "gangrene, with long-term current use of insulin (H)       insulin pen needle 31G X 8 MM    B-D U/F    100 each    Use 1 daily o as directed    Diabetes mellitus, type II (H)       methocarbamol 750 MG tablet    ROBAXIN    45 tablet    Take 1 tablet (750 mg) by mouth 4 times daily    Closed fracture of neck of right femur, initial encounter (H)       nateglinide 120 MG tablet    STARLIX    90 tablet    TAKE 1 TABLET BY MOUTH THREE TIMES DAILY BEFORE MEALS    Type 2 diabetes, controlled, with neuropathy (H)       OPTIFOAM 6\"X6\" Pads     1 each    1 Box once a week    Ulcer of right leg, with fat layer exposed (H)       * order for DME     2 each    Please measure and distribute 1 pair of 20mm Hg - 30mm Hg knee high ULCER CARE open or closed toe compression stockings.  Patient has a size 13 foot and please take this into consideration.  Jobst or equivalent    Varicose veins of lower extremities with other complications, Venous stasis ulcer of right lower extremity (H)       * order for DME     3 each    Please measure and distribute 1 pair of 20mmHg - 30mmHg knee high open or closed toe compression stockings. Jobst ultrasheer or equivalent.    Varicose veins of both lower extremities with complications       * order for DME     2 each    Please measure and distribute 1 pair of 30mmHg - 40mmHg knee high open toe ulcercare compression stockings. Jobst ultrasheer or equivalent.    Varicose veins of bilateral lower extremities with other complications       oxyCODONE IR 5 MG tablet    ROXICODONE     Take 1-2 tablets (5-10 mg) by mouth every 3 hours as needed for moderate to severe pain And scheduled Oxycodone 10 mg at 0800 and 1300 prior to therapies    Closed fracture of neck of right femur, initial encounter (H)       senna-docusate 8.6-50 MG per tablet    SENOKOT-S;PERICOLACE    100 tablet    Take 2 tablets by mouth 2 times daily as needed for constipation    Constipation, unspecified constipation type       sildenafil " 50 MG tablet    VIAGRA    10 tablet    Take 1 tablet (50 mg) by mouth daily as needed for erectile dysfunction    Vasculogenic erectile dysfunction, unspecified vasculogenic erectile dysfunction type       * silver sulfADIAZINE 1 % cream    SILVADENE    85 g    Apply topically daily To affected areas on right foot and leg.    Ulcer of right lower leg, with fat layer exposed (H), Chronic venous hypertension with ulcer involving right side (H), Type 2 diabetes, controlled, with neuropathy (H)       * SILVADENE 1 % cream   Generic drug:  silver sulfADIAZINE     20 g    Apply topically daily Dispensed at visit for heel and 5th mt ulcers.        simvastatin 10 MG tablet    ZOCOR    90 tablet    Take 1 tablet (10 mg) by mouth At Bedtime    Type 2 diabetes, controlled, with neuropathy (H)       sitagliptin 100 MG tablet    JANUVIA    90 tablet    Take 1 tablet (100 mg) by mouth daily    Type 2 diabetes, controlled, with neuropathy (H)       triamcinolone 0.1 % cream    KENALOG    30 g    Apply sparingly to left heel daily.    Dermatitis of left foot       * Notice:  This list has 5 medication(s) that are the same as other medications prescribed for you. Read the directions carefully, and ask your doctor or other care provider to review them with you.
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
## 2018-01-09 NOTE — NURSING NOTE
"Amos Walker's goals for this visit include: Wound check   He requests these members of his care team be copied on today's visit information: yes    PCP: Racheal Swift    Referring Provider:  Referred Self, MD  No address on file    Chief Complaint   Patient presents with     RECHECK     right foot recheck        Initial /54  Pulse 94  SpO2 98% Estimated body mass index is 21.61 kg/(m^2) as calculated from the following:    Height as of 10/19/17: 1.924 m (6' 3.75\").    Weight as of 12/29/17: 80 kg (176 lb 6.4 oz).  Medication Reconciliation: complete    "

## 2018-01-12 NOTE — TELEPHONE ENCOUNTER
Sent in to [facility] for benefit check 2018 year 01/12/18

## 2018-01-15 NOTE — TELEPHONE ENCOUNTER
"Nuield CPT code: , 88550, 55076 with up to 5 treatment applications Apligraf CPT code: , 99350, 99948 with up to 5 treatment applications PuraPly CPT code: , 43192, 06718 with up to 10  treatment applications with a diagnosis of L97.512, E11.49, E11.51 coverage with [place] of [place], doesn't require a PA under this policy. Patient has a $300 deductible and 20% co-insurance with a $1,750 out of pocket maximum with $0 met so far. Call Ref. # [id number] \"Nelly\" 01/12/18 4:55pm  "

## 2018-01-16 ENCOUNTER — OFFICE VISIT (OUTPATIENT)
Dept: PODIATRY | Facility: CLINIC | Age: 73
End: 2018-01-16
Payer: MEDICARE

## 2018-01-16 VITALS — SYSTOLIC BLOOD PRESSURE: 155 MMHG | HEART RATE: 106 BPM | OXYGEN SATURATION: 100 % | DIASTOLIC BLOOD PRESSURE: 66 MMHG

## 2018-01-16 DIAGNOSIS — E11.51 DIABETES MELLITUS WITH PERIPHERAL VASCULAR DISEASE (H): ICD-10-CM

## 2018-01-16 DIAGNOSIS — L97.912 ULCER OF LEG, CHRONIC, RIGHT, WITH FAT LAYER EXPOSED (H): Primary | ICD-10-CM

## 2018-01-16 DIAGNOSIS — L97.512 SKIN ULCER OF RIGHT FOOT WITH FAT LAYER EXPOSED (H): ICD-10-CM

## 2018-01-16 DIAGNOSIS — E11.49 TYPE II OR UNSPECIFIED TYPE DIABETES MELLITUS WITH NEUROLOGICAL MANIFESTATIONS, NOT STATED AS UNCONTROLLED(250.60) (H): ICD-10-CM

## 2018-01-16 PROCEDURE — 97597 DBRDMT OPN WND 1ST 20 CM/<: CPT | Performed by: PODIATRIST
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
## 2018-01-16 NOTE — MR AVS SNAPSHOT
After Visit Summary   1/16/2018    Amos Walker    MRN: [id number]           Patient Information     Date Of Birth          1945        Visit Information        Provider Department      1/16/2018 10:30 AM Brayan Mcclain DPM [facility]        Today's Diagnoses     Ulcer of leg, chronic, right, with fat layer exposed (H)    -  1    Skin ulcer of right foot with fat layer exposed (H)        Type II or unspecified type diabetes mellitus with neurological manifestations, not stated as uncontrolled(250.60) (H)        Diabetes mellitus with peripheral vascular disease (H)          Care Instructions    Thanks for coming today.  Ortho/Sports Medicine Clinic  [address] [place] [address]    To schedule future appointments in Ortho Clinic, you may call [phone].    To schedule ordered imaging by your Provider: Call [facility] at [phone]    [employer] available online at:   [email or web address]    Please call if any further questions or concerns [phone] and ask for the Orthopedic Department. Clinic hours 8 am to 5 pm.    Return to clinic if symptoms worsen.            Follow-ups after your visit        Your next 10 appointments already scheduled     Jan 22, 2018  8:30 AM CST   (Arrive by 8:15 AM)   PAC Pharmacist with  Pac Pharmacist   [facility] [place] ([facility])    [address] [place] [id number] [phone]            Jan 22, 2018  9:00 AM CST   (Arrive by 8:45 AM)   PAC EVALUATION with Uc Pac Jef 7   [facility] [place] ([facility])    [address] [place] [id number] [phone]            Jan 22, 2018 10:00 AM CST   (Arrive by 9:45 AM)   PAC RN ASSESSMENT with Rashad Pac Rn   [facility] [place] ([facility])    [address] [place] [address]  [facility] [address]   [phone]            Jan 22, 2018 10:20 AM CST   (Arrive by 10:05 AM)   PAC Anesthesia Consult with  Pac Anesthesiologist   [facility] ([facility] [place])    [address]  4th Floor  [facility] [address]   [phone]            Jan 23, 2018  1:15 PM CST   (Arrive by 1:00 PM)   MR CARDIAC W CONTRAST AND STRESS with UUMR4, [facility] CV MR NURSE   [facility] [place], MRI ([facility])    [address] [facility] [address]   [phone]           Take your medicines as usual, unless your doctor tells you not to.   If you take Viagra, Levitra or Cialis, stop taking it 48 hours before your test.   If you take Aggrenox or dipyridamole (Persantine, Permole), stop taking it 48 hours before your test.   If you take Theophylline or Aminophylline, stop taking it 12 hours before your test.   If you are diabetic and take oral hypoglycemics, do not take them on the day of your test.  The day before your exam, drink extra fluids at least six 8-ounce glasses (unless your doctor wants you to limit your fluids).  Stop all caffeine 12 hours before the test. This includes coffee, tea, soda, chocolate and certain medicines (such as Anacin, Excedrin and NoDoz). Also avoid decaf coffee and tea, as these contain small amounts of caffeine.  Do not eat or drink for 3 hours before your exam. You may drink water and take your morning medicines.  You may need a blood test (creatinine test) within 30 days of your exam. Follow your doctor s orders if this is arranged before your exam.  If you are very claustrophobic, please let you doctor know. You may get a sedative medicine from your doctor before you arrive. Bring the medicine to the exam. Do not take it at home. Arrive one hour early. Bring someone who can take you home after the test. Your medicine will make you sleepy. After the exam, you  may not drive, take a bus or take a taxi by yourself.  The MRI machine uses a strong magnet. Please wear clothes without metal (snaps, zippers). A sweatsuit works well, or we may give you a hospital gown.  Please remove any body piercings and hair extensions before you arrive. You will remove watches, jewelry, hairpins, wallets, dentures, partial dental plates and hearing aids. You may wear contact lenses, and you may be able to wear your rings. We have a safe place to keep your personal items, but it is safer to leave them at home.   **IMPORTANT** THE INSTRUCTIONS BELOW ARE ONLY FOR THOSE PATIENTS WHO HAVE BEEN TOLD THEY WILL RECEIVE SEDATION OR GENERAL ANESTHESIA DURING THEIR MRI PROCEDURE:  IF YOU WILL RECEIVE SEDATION (take medicine to help you relax during your exam):   You must get the medicine from your doctor before you arrive. Bring the medicine to the exam. Do not take it at home.   Arrive one hour early. Bring someone who can take you home after the test. Your medicine will make you sleepy. After the exam, you may not drive, take a bus or take a taxi by yourself.   No eating 8 hours before your exam. You may have clear liquids up until 4 hours before your exam. (Clear liquids include water, clear tea, black coffee and fruit juice without pulp.)  IF YOU WILL RECEIVE ANESTHESIA (be asleep for your exam):   Arrive 1 1/2 hours early. Bring someone who can take you home after the test. You may not drive, take a bus or take a taxi by yourself.   No eating 8 hours before your exam. You may have clear liquids up until 4 hours before your exam. (Clear liquids include water, clear tea, black coffee and fruit juice without pulp.)  If you have any questions, please contact your Imaging Department exam site.            Jan 26, 2018 10:00 AM CST   Return Visit with Brayan Mcclain DPM   [facility] ([facility])    [address] [address] [place] [address] [phone]             Jan 30, 2018 10:00 AM CST   [facility]   [facility]    [address] [address] [place] [id number] [phone]            Feb 01, 2018  1:30 PM CST   (Arrive by 1:15 PM)   New Vascular Visit with Anita Stock MD   [facility] ([facility])    [address] [facility]  3rd Floor  [facility] [address] [phone]            Feb 06, 2018  9:30 AM CST   Return Visit with Brayan Mcclain DPM   [facility]    [address] [address] [id number] [phone]              Who to contact     If you have questions or need follow up information about today's clinic visit or your schedule please contact [facility] directly at [phone].  Normal or non-critical lab and imaging results will be communicated to you by [employer], letter or phone within 4 business days after the clinic has received the results. If you do not hear from us within 7 days, please contact the clinic through [employer] or phone. If you have a critical or abnormal lab result, we will notify you by phone as soon as possible.  Submit refill requests through [employer] or call your pharmacy and they will forward the refill request to us. Please allow 3 business days for your refill to be completed.          Additional Information About Your Visit        [employer] Information     [employer] gives you secure access to your electronic health record. If you see a primary care provider, you can also send messages to your care team and make appointments. If you have questions, please call your primary care clinic.  If you do not have a primary care provider, please call [phone] and they will assist you.      [employer] is an electronic gateway that provides easy, online access to your medical records. With [employer], you can request a clinic appointment, read your test  results, renew a prescription or communicate with your care team.     To access your existing account, please contact your [facility] or call [phone] for assistance.        Care EveryWhere ID     This is your Care EveryWhere ID. This could be used by other organizations to access your [place] medical records  [id number]        Your Vitals Were     Pulse Pulse Oximetry                106 100%           Blood Pressure from Last 3 Encounters:   01/16/18 155/66   01/09/18 118/54   01/02/18 143/72    Weight from Last 3 Encounters:   12/29/17 80 kg (176 lb 6.4 oz)   10/19/17 83.5 kg (184 lb)   10/10/17 81.2 kg (179 lb)              We Performed the Following     DEBRIDEMENT WOUND UP TO 20 SQ CM        Primary Care Provider Office Phone # Fax #    Racheal Swift [phone]       [address] [place] [address] [address] [place] [address]        Equal Access to Services     SAMSON MELTON : Hadii aad ku hadasho Soomaali, waaxda luqadaha, qaybta kaalmada adeegyada, waxay geniin hayhollie duran . So [facility] [phone].    ATENCIÓN: Si habla español, tiene a rodrigues disposición servicios gratuitos de asistencia lingüística. Llame al [phone].    We comply with applicable federal civil rights laws and [place] laws. We do not discriminate on the basis of race, color, national origin, age, disability, sex, sexual orientation, or gender identity.            Thank you!     Thank you for choosing [facility]  for your care. Our goal is always to provide you with excellent care. Hearing back from our patients is one way we can continue to improve our services. Please take a few minutes to complete the written survey that you may receive in the mail after your visit with us. Thank you!             Your Updated Medication List - Protect others around you: Learn how to safely use, store and throw away your medicines at www.disposemymeds.org.          This list  is accurate as of: 1/16/18 11:59 PM.  Always use your most recent med list.                   Brand Name Dispense Instructions for use Diagnosis    acetaminophen 500 MG tablet    TYLENOL     Take 2 tablets (1,000 mg) by mouth 3 times daily    Closed fracture of neck of right femur, initial encounter (H)       ammonium lactate 12 % cream    LAC-HYDRIN    385 g    Apply topically 2 times daily as needed for dry skin    Venous stasis, Type 2 diabetes, controlled, with neuropathy (H)       ascorbic acid 500 MG Tabs     30 tablet    Take 1 tablet (500 mg) by mouth 2 times daily    Ulcer of right lower leg, with fat layer exposed (H)       blood glucose monitoring lancets     3 Box    Use to test blood sugars 2 as directed.    Type 2 diabetes, uncontrolled, with neuropathy (H)       blood glucose monitoring test strip    ONETOUCH ULTRA    60 strip    Use to test blood sugars 2 times daily or as directed.    Type 2 diabetes mellitus (H)       Cholecalciferol 3000 UNITS Tabs     30 tablet    Take 3,000 Units by mouth daily    Closed fracture of neck of right femur, initial encounter (H)       clopidogrel 75 MG tablet    PLAVIX    30 tablet    Take 1 tablet (75 mg) by mouth daily    Peripheral vascular disease, unspecified       COLACE PO           ferrous sulfate 325 (65 FE) MG tablet    IRON    60 tablet    Take 1 tablet (325 mg) by mouth 2 times daily    Peripheral vascular disease, unspecified       gentamicin 0.1 % cream    GARAMYCIN    30 g    Apply topically daily To right leg ulcer.    Ulcer of right lower leg, with fat layer exposed (H), Chronic venous hypertension with ulcer involving right side (H), Type 2 diabetes, controlled, with neuropathy (H)       hydrocortisone 0.2 % cream    WESTCORT    15 g    Apply sparingly to affected area twice times daily for 14 days.    Dermatitis       insulin glargine 100 UNIT/ML injection    LANTUS SOLOSTAR    15 mL    Inject 25 Units Subcutaneous At Bedtime    Type 2 diabetes  "mellitus with diabetic peripheral angiopathy without gangrene, with long-term current use of insulin (H)       insulin pen needle 31G X 8 MM    B-D U/F    100 each    Use 1 daily o as directed    Diabetes mellitus, type II (H)       methocarbamol 750 MG tablet    ROBAXIN    45 tablet    Take 1 tablet (750 mg) by mouth 4 times daily    Closed fracture of neck of right femur, initial encounter (H)       nateglinide 120 MG tablet    STARLIX    90 tablet    TAKE 1 TABLET BY MOUTH THREE TIMES DAILY BEFORE MEALS    Type 2 diabetes, controlled, with neuropathy (H)       OPTIFOAM 6\"X6\" Pads     1 each    1 Box once a week    Ulcer of right leg, with fat layer exposed (H)       * order for DME     2 each    Please measure and distribute 1 pair of 20mm Hg - 30mm Hg knee high ULCER CARE open or closed toe compression stockings.  Patient has a size 13 foot and please take this into consideration.  Jobst or equivalent    Varicose veins of lower extremities with other complications, Venous stasis ulcer of right lower extremity (H)       * order for DME     3 each    Please measure and distribute 1 pair of 20mmHg - 30mmHg knee high open or closed toe compression stockings. Jobst ultrasheer or equivalent.    Varicose veins of both lower extremities with complications       * order for DME     2 each    Please measure and distribute 1 pair of 30mmHg - 40mmHg knee high open toe ulcercare compression stockings. Jobst ultrasheer or equivalent.    Varicose veins of bilateral lower extremities with other complications       oxyCODONE IR 5 MG tablet    ROXICODONE     Take 1-2 tablets (5-10 mg) by mouth every 3 hours as needed for moderate to severe pain And scheduled Oxycodone 10 mg at 0800 and 1300 prior to therapies    Closed fracture of neck of right femur, initial encounter (H)       senna-docusate 8.6-50 MG per tablet    SENOKOT-S;PERICOLACE    100 tablet    Take 2 tablets by mouth 2 times daily as needed for constipation    " Constipation, unspecified constipation type       sildenafil 50 MG tablet    VIAGRA    10 tablet    Take 1 tablet (50 mg) by mouth daily as needed for erectile dysfunction    Vasculogenic erectile dysfunction, unspecified vasculogenic erectile dysfunction type       * silver sulfADIAZINE 1 % cream    SILVADENE    85 g    Apply topically daily To affected areas on right foot and leg.    Ulcer of right lower leg, with fat layer exposed (H), Chronic venous hypertension with ulcer involving right side (H), Type 2 diabetes, controlled, with neuropathy (H)       * SILVADENE 1 % cream   Generic drug:  silver sulfADIAZINE     20 g    Apply topically daily Dispensed at visit for heel and 5th mt ulcers.        simvastatin 10 MG tablet    ZOCOR    90 tablet    Take 1 tablet (10 mg) by mouth At Bedtime    Type 2 diabetes, controlled, with neuropathy (H)       sitagliptin 100 MG tablet    JANUVIA    90 tablet    Take 1 tablet (100 mg) by mouth daily    Type 2 diabetes, controlled, with neuropathy (H)       triamcinolone 0.1 % cream    KENALOG    30 g    Apply sparingly to left heel daily.    Dermatitis of left foot       * Notice:  This list has 5 medication(s) that are the same as other medications prescribed for you. Read the directions carefully, and ask your doctor or other care provider to review them with you.
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
## 2018-01-16 NOTE — PATIENT INSTRUCTIONS
Thanks for coming today.  Ortho/Sports Medicine Clinic  [address] [place] [address]    To schedule future appointments in Ortho Clinic, you may call [phone].    To schedule ordered imaging by your Provider: Call [facility] at [phone]    [employer] available online at:   [email or web address]    Please call if any further questions or concerns [phone] and ask for the Orthopedic Department. Clinic hours 8 am to 5 pm.    Return to clinic if symptoms worsen.

## 2018-01-16 NOTE — NURSING NOTE
"Amos Walker's goals for this visit include: Leg wound check   He requests these members of his care team be copied on today's visit information: yes    PCP: Racheal Swift    Referring Provider:  Referred Self, MD  No address on file    Chief Complaint   Patient presents with     RECHECK     Leg wound check        Initial /66  Pulse 106  SpO2 100% Estimated body mass index is 21.61 kg/(m^2) as calculated from the following:    Height as of 10/19/17: 1.924 m (6' 3.75\").    Weight as of 12/29/17: 80 kg (176 lb 6.4 oz).  Medication Reconciliation: complete    "

## 2018-01-22 ENCOUNTER — OFFICE VISIT (OUTPATIENT)
Dept: SURGERY | Facility: CLINIC | Age: 73
End: 2018-01-22
Payer: MEDICARE

## 2018-01-22 ENCOUNTER — APPOINTMENT (OUTPATIENT)
Dept: SURGERY | Facility: CLINIC | Age: 73
End: 2018-01-22
Payer: MEDICARE

## 2018-01-22 ENCOUNTER — ALLIED HEALTH/NURSE VISIT (OUTPATIENT)
Dept: SURGERY | Facility: CLINIC | Age: 73
End: 2018-01-22
Payer: MEDICARE

## 2018-01-22 VITALS
DIASTOLIC BLOOD PRESSURE: 71 MMHG | WEIGHT: 176.7 LBS | BODY MASS INDEX: 21.97 KG/M2 | OXYGEN SATURATION: 97 % | HEART RATE: 101 BPM | TEMPERATURE: 97.9 F | SYSTOLIC BLOOD PRESSURE: 135 MMHG | HEIGHT: 75 IN | RESPIRATION RATE: 18 BRPM

## 2018-01-22 DIAGNOSIS — Z01.818 PREOP EXAMINATION: Primary | ICD-10-CM

## 2018-01-22 DIAGNOSIS — I73.9 PVD (PERIPHERAL VASCULAR DISEASE) (H): Primary | ICD-10-CM

## 2018-01-22 NOTE — PROGRESS NOTES
[facility] Assessment [facility] medication history for January 22, 2018 is complete.    See Epic admission navigator for allergy information, pharmacy and prior to admission medications.    Operating room staff will still need to confirm medications and last dose information on day of surgery.     Medication history interview sources:  patient, patient's med list.     Changes made to PTA medication list (reason)  Added: aspirin, lisinopril,   Deleted: acetaminophen, hydrocortisone, oxycodone, senna/docusate,   Changed: sig on colace, gentamicin cream,     Additional medication history information (including reliability of information, actions taken by pharmacist):None    -- No recent (within 30 days) course of antibiotics  -- No recent (within 30 days) course of steroids  -- No recent (within 30 days) chronic daily medications stopped   -- Patient declines being on any other over-the-counter medications    Prior to Admission medications    Medication Sig Last Dose Taking? Auth Provider   VITAMIN D, CHOLECALCIFEROL, PO Take 1,000 Units by mouth 2 times daily Taking Yes Unknown, Entered By History   ASPIRIN PO Take 81 mg by mouth daily Taking Yes Unknown, Entered By History   LISINOPRIL PO Take 20 mg by mouth 2 times daily Taking Yes Unknown, Entered By History   triamcinolone (KENALOG) 0.1 % cream Apply sparingly to left heel daily. Taking Yes Brayan Mcclain DPM   insulin glargine (LANTUS SOLOSTAR) 100 UNIT/ML injection Inject 25 Units Subcutaneous At Bedtime  Patient taking differently: Inject 25 Units Subcutaneous daily (with dinner)  Taking Yes Racheal Swift MD   Docusate Sodium (COLACE PO) Take 100 mg by mouth daily  Taking Yes Reported, Patient   nateglinide (STARLIX) 120 MG tablet TAKE 1 TABLET BY MOUTH THREE TIMES DAILY BEFORE MEALS Taking Yes Rik Saleh NP   sitagliptin (JANUVIA) 100 MG tablet Take 1 tablet (100 mg) by mouth daily Taking Yes Rik Saleh NP   simvastatin (ZOCOR) 10  MG tablet Take 1 tablet (10 mg) by mouth At Bedtime Taking Yes Rik Saleh NP   ammonium lactate (LAC-HYDRIN) 12 % cream Apply topically 2 times daily as needed for dry skin Taking Yes Rik Saleh NP   gentamicin (GARAMYCIN) 0.1 % cream Apply topically daily To right leg ulcer.  Patient taking differently: Apply topically daily as needed To right leg ulcer. Taking Yes Rik Saleh NP   silver sulfADIAZINE (SILVADENE) 1 % cream Apply topically daily To affected areas on right foot and leg. Taking Yes Rik Saleh NP   ferrous sulfate (IRON) 325 (65 FE) MG tablet Take 1 tablet (325 mg) by mouth 2 times daily Taking Yes Rik Saleh NP   clopidogrel (PLAVIX) 75 MG tablet Take 1 tablet (75 mg) by mouth daily  Patient taking differently: Take 75 mg by mouth every evening  Taking Yes Rik Saleh NP   ascorbic acid 500 MG TABS Take 1 tablet (500 mg) by mouth 2 times daily Taking Yes Rik Saleh NP   sildenafil (VIAGRA) 50 MG tablet Take 1 tablet (50 mg) by mouth daily as needed for erectile dysfunction Taking Yes Silvina Patiño MD   insulin pen needle (B-D U/F) 31G X 8 MM Use 1 daily o as directed   Silvina Patiño MD   order for DME Please measure and distribute 1 pair of 30mmHg - 40mmHg knee high open toe ulcercare compression stockings. Jobst ultrasheer or equivalent.   Olga Burgos APRN CNP   blood glucose monitoring (ONE TOUCH ULTRA) test strip Use to test blood sugars 2 times daily or as directed.   Silvina Patiño MD   blood glucose monitoring (FREESTYLE) lancets Use to test blood sugars 2 as directed.   Silvina Patiño MD   order for DME Please measure and distribute 1 pair of 20mmHg - 30mmHg knee high open or closed toe compression stockings. Jobst ultrasheer or equivalent.   Lane Jenkins MD   order for DME Please measure and distribute 1 pair of 20mm Hg - 30mm Hg knee high ULCER CARE open or closed toe compression  "stockings.   Patient has a size 13 foot and please take this into consideration.   Jobst or equivalent   Silvina Patiño MD   Gauze Pads & Dressings (OPTIFOAM) 6\"X6\" PADS 1 Box once a week   Raman Villanueva MD           Medication history completed by: Saeed Mc [facility]    "

## 2018-01-22 NOTE — MR AVS SNAPSHOT
After Visit Summary   2018    Amos Walker    MRN: [id number]           Patient Information     Date Of Birth          1945        Visit Information        Provider Department      2018 10:00 AM Rn, [facility]        Care Instructions    Preparing for Your Surgery      Name:  Amos Walker   MRN:  [id number]   :  1945   Today's Date:  2018     Arriving for surgery:We will call with date  time  and location and will review medications when we have a surgery date   Surgery date:    Arrival time:    Please come to    What can I eat or drink?  -  You may have solid food or milk products until 8 hours prior to your surgery.  -  You may have water, apple juice or 7up/Sprite until 2 hours prior to your surgery.    Which medicines can I take?  -  Do NOT take these medications in the morning, the day of surgery:        -  Please take these medications the day of surgery:        How do I prepare myself?  -  Take two showers: one the night before surgery; and one the morning of surgery.         Use Scrubcare or Hibiclens to wash from neck down.  You may use your own shampoo and conditioner. No other hair products.   -  Do NOT use lotion, powder, deodorant, or antiperspirant the day of your surgery.  -  Do NOT wear any makeup, fingernail polish or jewelry.  -Do not bring your own medications to the hospital, except for inhalers and eye drops.  -  Bring your ID and insurance card.    Questions or Concerns:  If you have questions or concerns, please call the  Preoperative Assessment Center, Monday-Friday 7AM-7PM:  [phone]            AFTER YOUR SURGERY  Breathing exercises   Breathing exercises help you recover faster. Take deep breaths and let the air out slowly. This will:     Help you wake up after surgery.    Help prevent complications like pneumonia.  Preventing complications will help you go home sooner.   We may give you a breathing device  (incentive spirometer) to encourage you to breathe deeply.   Nausea and vomiting   You may feel sick to your stomach after surgery; if so, let your nurse know.    Pain control:  After surgery, you may have pain. Our goal is to help you manage your pain. Pain medicine will help you feel comfortable enough to do activities that will help you heal.  These activities may include breathing exercises, walking and physical therapy.   To help your health care team treat your pain we will ask: 1) If you have pain  2) where it is located 3) describe your pain in your words  Methods of pain control include medications given by mouth, vein or by nerve block for some surgeries.  We may give you a pain control pump that will:  1) Deliver the medicine through a tube placed in your vein  2) Control the amount of medicine you receive  3) Allow you to push a button to deliver a dose of pain medicine  Sequential Compression Device (SCD) or Pneumo Boots:  You may need to wear SCD S on your legs or feet. These are wraps connected to a machine that pumps in air and releases it. The repeated pumping helps prevent blood clots from forming.           Follow-ups after your visit        Your next 10 appointments already scheduled     Jan 22, 2018 10:00 AM CST   (Arrive by 9:45 AM)   PAC RN ASSESSMENT with  Pac Rn   [facility] ([facility])    [address] [place] [address]  [address] [facility] [id number] [phone]            Jan 22, 2018 10:20 AM CST   (Arrive by 10:05 AM)   PAC Anesthesia Consult with  Pac Anesthesiologist   [facility] ([facility])    [address] [place] [address]  [address] [facility] [id number] [phone]            Jan 22, 2018 10:45 AM CST   LAB with  LAB   [facility] Lab ([facility])    [address] [place] [address]  [address] [facility] [id number] [phone]           Please do not  eat 10-12 hours before your appointment if you are coming in fasting for labs on lipids, cholesterol, or glucose (sugar). This does not apply to pregnant women. Water, hot tea and black coffee (with nothing added) are okay. Do not drink other fluids, diet soda or chew gum.            Jan 23, 2018  1:15 PM CST   (Arrive by 1:00 PM)   MR CARDIAC W CONTRAST AND STRESS with UUMR4, UU CV MR NURSE   [facility] [place], MRI ([facility])    [address] [facility] [id number] [phone]           Take your medicines as usual, unless your doctor tells you not to.   If you take Viagra, Levitra or Cialis, stop taking it 48 hours before your test.   If you take Aggrenox or dipyridamole (Persantine, Permole), stop taking it 48 hours before your test.   If you take Theophylline or Aminophylline, stop taking it 12 hours before your test.   If you are diabetic and take oral hypoglycemics, do not take them on the day of your test.  The day before your exam, drink extra fluids at least six 8-ounce glasses (unless your doctor wants you to limit your fluids).  Stop all caffeine 12 hours before the test. This includes coffee, tea, soda, chocolate and certain medicines (such as Anacin, Excedrin and NoDoz). Also avoid decaf coffee and tea, as these contain small amounts of caffeine.  Do not eat or drink for 3 hours before your exam. You may drink water and take your morning medicines.  You may need a blood test (creatinine test) within 30 days of your exam. Follow your doctor s orders if this is arranged before your exam.  If you are very claustrophobic, please let you doctor know. You may get a sedative medicine from your doctor before you arrive. Bring the medicine to the exam. Do not take it at home. Arrive one hour early. Bring someone who can take you home after the test. Your medicine will make you sleepy. After the exam, you may not drive, take a bus or take a taxi by  yourself.  The MRI machine uses a strong magnet. Please wear clothes without metal (snaps, zippers). A sweatsuit works well, or we may give you a hospital gown.  Please remove any body piercings and hair extensions before you arrive. You will remove watches, jewelry, hairpins, wallets, dentures, partial dental plates and hearing aids. You may wear contact lenses, and you may be able to wear your rings. We have a safe place to keep your personal items, but it is safer to leave them at home.   **IMPORTANT** THE INSTRUCTIONS BELOW ARE ONLY FOR THOSE PATIENTS WHO HAVE BEEN TOLD THEY WILL RECEIVE SEDATION OR GENERAL ANESTHESIA DURING THEIR MRI PROCEDURE:  IF YOU WILL RECEIVE SEDATION (take medicine to help you relax during your exam):   You must get the medicine from your doctor before you arrive. Bring the medicine to the exam. Do not take it at home.   Arrive one hour early. Bring someone who can take you home after the test. Your medicine will make you sleepy. After the exam, you may not drive, take a bus or take a taxi by yourself.   No eating 8 hours before your exam. You may have clear liquids up until 4 hours before your exam. (Clear liquids include water, clear tea, black coffee and fruit juice without pulp.)  IF YOU WILL RECEIVE ANESTHESIA (be asleep for your exam):   Arrive 1 1/2 hours early. Bring someone who can take you home after the test. You may not drive, take a bus or take a taxi by yourself.   No eating 8 hours before your exam. You may have clear liquids up until 4 hours before your exam. (Clear liquids include water, clear tea, black coffee and fruit juice without pulp.)  If you have any questions, please contact your Imaging Department exam site.            Jan 26, 2018 10:00 AM CST   Return Visit with Brayan Mcclain DPM   [facility] ([facility])    [id number] [place] [id number] [phone]            Jan 30, 2018 10:00 AM CST   Return Visit  with Brayan Mcclain DPM   [facility] ([facility])    [address] [address] [place] [address]   [phone]            Feb 01, 2018  1:30 PM CST   (Arrive by 1:15 PM)   New Vascular Visit with Anita Stock MD   [facility] Vascular [facility] ([facility])    [address]  3rd Floor  [facility] [id number] [phone]            Feb 06, 2018  9:30 AM CST   Return Visit with Brayan Mcclain DPM   [facility] ([facility])    [address] [place] [address]   [phone]            Apr 27, 2018 10:25 AM CDT   (Arrive by 10:10 AM)   Return Visit with Racheal Swift MD   [facility] Primary Care [facility] ([facility])    [address]  4th Floor  [facility] [id number] [phone]              Who to contact     Please call your clinic at [phone] to:    Ask questions about your health    Make or cancel appointments    Discuss your medicines    Learn about your test results    Speak to your doctor   If you have compliments or concerns about an experience at your clinic, or if you wish to file a complaint, please contact [facility] Patient Relations at [phone] or email us at [email or web address]         Additional Information About Your Visit        [employer] Information     [employer] gives you secure access to your electronic health record. If you see a primary care provider, you can also send messages to your care team and make appointments. If you have questions, please call your primary care clinic.  If you do not have a primary care provider, please call [phone] and they will assist you.      [employer] is an electronic gateway that provides easy, online access to your medical records. With [employer], you can request a clinic appointment, read your test results, renew a prescription or communicate with your care  team.     To access your existing account, please contact your [facility] or call [phone] for assistance.        Care EveryWhere ID     This is your Care EveryWhere ID. This could be used by other organizations to access your [place] medical records  [id number]         Blood Pressure from Last 3 Encounters:   01/22/18 135/71   01/16/18 155/66   01/09/18 118/54    Weight from Last 3 Encounters:   01/22/18 80.2 kg (176 lb 11.2 oz)   12/29/17 80 kg (176 lb 6.4 oz)   10/19/17 83.5 kg (184 lb)              Today, you had the following     No orders found for display         Today's Medication Changes          These changes are accurate as of: 1/22/18  9:43 AM.  If you have any questions, ask your nurse or doctor.               These medicines have changed or have updated prescriptions.        Dose/Directions    clopidogrel 75 MG tablet   Commonly known as:  PLAVIX   This may have changed:  when to take this   Used for:  Peripheral vascular disease, unspecified        Dose:  75 mg   Take 1 tablet (75 mg) by mouth daily   Quantity:  30 tablet   Refills:  11       gentamicin 0.1 % cream   Commonly known as:  GARAMYCIN   This may have changed:    - when to take this  - reasons to take this  - additional instructions   Used for:  Ulcer of right lower leg, with fat layer exposed (H), Chronic venous hypertension with ulcer involving right side (H), Type 2 diabetes, controlled, with neuropathy (H)        Apply topically daily To right leg ulcer.   Quantity:  30 g   Refills:  5       insulin glargine 100 UNIT/ML injection   Commonly known as:  LANTUS SOLOSTAR   This may have changed:  when to take this   Used for:  Type 2 diabetes mellitus with diabetic peripheral angiopathy without gangrene, with long-term current use of insulin (H)        Dose:  25 Units   Inject 25 Units Subcutaneous At Bedtime   Quantity:  15 mL   Refills:  2                Primary Care Provider Office Phone # Fax #     Racheal Swift [phone]       [address] [place] [address]  [facility] [address]        Equal Access to Services     SAMSON MELTON : Hadii aad ku hadjeannieyomaira Bedoya, wabruno abebe, hollie veronicakaylikylee cabellojessee, yolanda geniin hayaakristie cabellodamion blair renee lopez. So [facility] [phone].    ATENCIÓN: Si habla español, tiene a rodrigues disposición servicios gratuitos de asistencia lingüística. Llame al [phone].    We comply with applicable federal civil rights laws and [place] laws. We do not discriminate on the basis of race, color, national origin, age, disability, sex, sexual orientation, or gender identity.            Thank you!     Thank you for choosing [facility]OPER[facility]  for your care. Our goal is always to provide you with excellent care. Hearing back from our patients is one way we can continue to improve our services. Please take a few minutes to complete the written survey that you may receive in the mail after your visit with us. Thank you!             Your Updated Medication List - Protect others around you: Learn how to safely use, store and throw away your medicines at www.disposemymeds.org.          This list is accurate as of: 1/22/18  9:43 AM.  Always use your most recent med list.                   Brand Name Dispense Instructions for use Diagnosis    ammonium lactate 12 % cream    LAC-HYDRIN    385 g    Apply topically 2 times daily as needed for dry skin    Venous stasis, Type 2 diabetes, controlled, with neuropathy (H)       ascorbic acid 500 MG Tabs     30 tablet    Take 1 tablet (500 mg) by mouth 2 times daily    Ulcer of right lower leg, with fat layer exposed (H)       ASPIRIN PO      Take 81 mg by mouth daily        blood glucose monitoring lancets     3 Box    Use to test blood sugars 2 as directed.    Type 2 diabetes, uncontrolled, with neuropathy (H)       blood glucose monitoring test strip    ONETOUCH ULTRA    60 strip    Use to test blood sugars 2 times  "daily or as directed.    Type 2 diabetes mellitus (H)       clopidogrel 75 MG tablet    PLAVIX    30 tablet    Take 1 tablet (75 mg) by mouth daily    Peripheral vascular disease, unspecified       COLACE PO      Take 100 mg by mouth daily        ferrous sulfate 325 (65 FE) MG tablet    IRON    60 tablet    Take 1 tablet (325 mg) by mouth 2 times daily    Peripheral vascular disease, unspecified       gentamicin 0.1 % cream    GARAMYCIN    30 g    Apply topically daily To right leg ulcer.    Ulcer of right lower leg, with fat layer exposed (H), Chronic venous hypertension with ulcer involving right side (H), Type 2 diabetes, controlled, with neuropathy (H)       insulin glargine 100 UNIT/ML injection    LANTUS SOLOSTAR    15 mL    Inject 25 Units Subcutaneous At Bedtime    Type 2 diabetes mellitus with diabetic peripheral angiopathy without gangrene, with long-term current use of insulin (H)       insulin pen needle 31G X 8 MM    B-D U/F    100 each    Use 1 daily o as directed    Diabetes mellitus, type II (H)       LISINOPRIL PO      Take 20 mg by mouth 2 times daily        nateglinide 120 MG tablet    STARLIX    90 tablet    TAKE 1 TABLET BY MOUTH THREE TIMES DAILY BEFORE MEALS    Type 2 diabetes, controlled, with neuropathy (H)       OPTIFOAM 6\"X6\" Pads     1 each    1 Box once a week    Ulcer of right leg, with fat layer exposed (H)       * order for DME     2 each    Please measure and distribute 1 pair of 20mm Hg - 30mm Hg knee high ULCER CARE open or closed toe compression stockings.  Patient has a size 13 foot and please take this into consideration.  Jobst or equivalent    Varicose veins of lower extremities with other complications, Venous stasis ulcer of right lower extremity (H)       * order for DME     3 each    Please measure and distribute 1 pair of 20mmHg - 30mmHg knee high open or closed toe compression stockings. Jobst ultrasheer or equivalent.    Varicose veins of both lower extremities with " complications       * order for DME     2 each    Please measure and distribute 1 pair of 30mmHg - 40mmHg knee high open toe ulcercare compression stockings. Jobst ultrasheer or equivalent.    Varicose veins of bilateral lower extremities with other complications       sildenafil 50 MG tablet    VIAGRA    10 tablet    Take 1 tablet (50 mg) by mouth daily as needed for erectile dysfunction    Vasculogenic erectile dysfunction, unspecified vasculogenic erectile dysfunction type       silver sulfADIAZINE 1 % cream    SILVADENE    85 g    Apply topically daily To affected areas on right foot and leg.    Ulcer of right lower leg, with fat layer exposed (H), Chronic venous hypertension with ulcer involving right side (H), Type 2 diabetes, controlled, with neuropathy (H)       simvastatin 10 MG tablet    ZOCOR    90 tablet    Take 1 tablet (10 mg) by mouth At Bedtime    Type 2 diabetes, controlled, with neuropathy (H)       sitagliptin 100 MG tablet    JANUVIA    90 tablet    Take 1 tablet (100 mg) by mouth daily    Type 2 diabetes, controlled, with neuropathy (H)       triamcinolone 0.1 % cream    KENALOG    30 g    Apply sparingly to left heel daily.    Dermatitis of left foot       VITAMIN D (CHOLECALCIFEROL) PO      Take 1,000 Units by mouth 2 times daily        * Notice:  This list has 3 medication(s) that are the same as other medications prescribed for you. Read the directions carefully, and ask your doctor or other care provider to review them with you.

## 2018-01-22 NOTE — MR AVS SNAPSHOT
After Visit Summary   1/22/2018    Amos Walker    MRN: [id number]           Patient Information     Date Of Birth          1945        Visit Information        Provider Department      1/22/2018 8:30 AM Pharmacist, Abner Brambila [facility] [place]        Today's Diagnoses     Preop examination    -  1       Follow-ups after your visit        Your next 10 appointments already scheduled     Jan 22, 2018  9:00 AM CST   (Arrive by 8:45 AM)   PAC EVALUATION with Abner Pac Jef 7   [facility] Preoperative Assessment [place] ([facility])    [address] [place] [address]   [phone]            Jan 22, 2018 10:00 AM CST   (Arrive by 9:45 AM)   PAC RN ASSESSMENT with Abner Pac Rn   [facility] [place] ([facility])    [address] [place] [address]   [phone]            Jan 22, 2018 10:20 AM CST   (Arrive by 10:05 AM)   PAC Anesthesia Consult with Abner Pac Anesthesiologist   [facility] [place] ([facility])    [address] [place] [address]   [phone]            Jan 22, 2018 10:45 AM CST   LAB with ABNER LAB   [facility])    [address] [place] [address]   [phone]           Please do not eat 10-12 hours before your appointment if you are coming in fasting for labs on lipids, cholesterol, or glucose (sugar). This does not apply to pregnant women. Water, hot tea and black coffee (with nothing added) are okay. Do not drink other fluids, diet soda or chew gum.            Jan 23, 2018  1:15 PM CST   (Arrive by 1:00 PM)   MR CARDIAC W CONTRAST AND STRESS with UUMR4, UU CV MR NURSE   [facility], [place], MRI ([facility])    [address] [facility] [address] [phone]            Take your medicines as usual, unless your doctor tells you not to.   If you take Viagra, Levitra or Cialis, stop taking it 48 hours before your test.   If you take Aggrenox or dipyridamole (Persantine, Permole), stop taking it 48 hours before your test.   If you take Theophylline or Aminophylline, stop taking it 12 hours before your test.   If you are diabetic and take oral hypoglycemics, do not take them on the day of your test.  The day before your exam, drink extra fluids at least six 8-ounce glasses (unless your doctor wants you to limit your fluids).  Stop all caffeine 12 hours before the test. This includes coffee, tea, soda, chocolate and certain medicines (such as Anacin, Excedrin and NoDoz). Also avoid decaf coffee and tea, as these contain small amounts of caffeine.  Do not eat or drink for 3 hours before your exam. You may drink water and take your morning medicines.  You may need a blood test (creatinine test) within 30 days of your exam. Follow your doctor s orders if this is arranged before your exam.  If you are very claustrophobic, please let you doctor know. You may get a sedative medicine from your doctor before you arrive. Bring the medicine to the exam. Do not take it at home. Arrive one hour early. Bring someone who can take you home after the test. Your medicine will make you sleepy. After the exam, you may not drive, take a bus or take a taxi by yourself.  The MRI machine uses a strong magnet. Please wear clothes without metal (snaps, zippers). A sweatsuit works well, or we may give you a hospital gown.  Please remove any body piercings and hair extensions before you arrive. You will remove watches, jewelry, hairpins, wallets, dentures, partial dental plates and hearing aids. You may wear contact lenses, and you may be able to wear your rings. We have a safe place to keep your personal items, but it is safer to leave them at home.   **IMPORTANT** THE INSTRUCTIONS BELOW ARE ONLY FOR  THOSE PATIENTS WHO HAVE BEEN TOLD THEY WILL RECEIVE SEDATION OR GENERAL ANESTHESIA DURING THEIR MRI PROCEDURE:  IF YOU WILL RECEIVE SEDATION (take medicine to help you relax during your exam):   You must get the medicine from your doctor before you arrive. Bring the medicine to the exam. Do not take it at home.   Arrive one hour early. Bring someone who can take you home after the test. Your medicine will make you sleepy. After the exam, you may not drive, take a bus or take a taxi by yourself.   No eating 8 hours before your exam. You may have clear liquids up until 4 hours before your exam. (Clear liquids include water, clear tea, black coffee and fruit juice without pulp.)  IF YOU WILL RECEIVE ANESTHESIA (be asleep for your exam):   Arrive 1 1/2 hours early. Bring someone who can take you home after the test. You may not drive, take a bus or take a taxi by yourself.   No eating 8 hours before your exam. You may have clear liquids up until 4 hours before your exam. (Clear liquids include water, clear tea, black coffee and fruit juice without pulp.)  If you have any questions, please contact your Imaging Department exam site.            Jan 26, 2018 10:00 AM CST   Return Visit with Brayan Mcclain DPM   [facility])    [address] [address] [place] [address]   [phone]            Jan 30, 2018 10:00 AM CST   Return Visit with Brayan Mcclain DPM   [facility] ([facility])    [address] [place] [address]   [phone]            Feb 01, 2018  1:30 PM CST   (Arrive by 1:15 PM)   New Vascular Visit with Anita Stock MD   [facility] ([facility])    [address] [facility]  [address] [facility] [address]   [phone]            Feb 06, 2018  9:30 AM CST   Return Visit with Brayan Mcclain DPM   [facility] ([facility])    [address]  [address] [place] [address] [phone]              Who to contact     Please call your clinic at [phone] to:    Ask questions about your health    Make or cancel appointments    Discuss your medicines    Learn about your test results    Speak to your doctor   If you have compliments or concerns about an experience at your clinic, or if you wish to file a complaint, please contact [facility] Patient Relations at [phone] or email us at [email or web address]         Additional Information About Your Visit        [employer] Information     [employer] gives you secure access to your electronic health record. If you see a primary care provider, you can also send messages to your care team and make appointments. If you have questions, please call your primary care clinic.  If you do not have a primary care provider, please call [phone] and they will assist you.      [employer] is an electronic gateway that provides easy, online access to your medical records. With [employer], you can request a clinic appointment, read your test results, renew a prescription or communicate with your care team.     To access your existing account, please contact your [facility] or call [phone] for assistance.        Care EveryWhere ID     This is your Care EveryWhere ID. This could be used by other organizations to access your [facility] medical records  [id number]         Blood Pressure from Last 3 Encounters:   01/16/18 155/66   01/09/18 118/54   01/02/18 143/72    Weight from Last 3 Encounters:   12/29/17 80 kg (176 lb 6.4 oz)   10/19/17 83.5 kg (184 lb)   10/10/17 81.2 kg (179 lb)              Today, you had the following     No orders found for display         Today's Medication Changes          These changes are accurate as of: 1/22/18  8:38 AM.  If you have any questions, ask your nurse or doctor.               These medicines have changed or  have updated prescriptions.        Dose/Directions    clopidogrel 75 MG tablet   Commonly known as:  PLAVIX   This may have changed:  when to take this   Used for:  Peripheral vascular disease, unspecified        Dose:  75 mg   Take 1 tablet (75 mg) by mouth daily   Quantity:  30 tablet   Refills:  11       gentamicin 0.1 % cream   Commonly known as:  GARAMYCIN   This may have changed:    - when to take this  - reasons to take this  - additional instructions   Used for:  Ulcer of right lower leg, with fat layer exposed (H), Chronic venous hypertension with ulcer involving right side (H), Type 2 diabetes, controlled, with neuropathy (H)        Apply topically daily To right leg ulcer.   Quantity:  30 g   Refills:  5       insulin glargine 100 UNIT/ML injection   Commonly known as:  LANTUS SOLOSTAR   This may have changed:  when to take this   Used for:  Type 2 diabetes mellitus with diabetic peripheral angiopathy without gangrene, with long-term current use of insulin (H)        Dose:  25 Units   Inject 25 Units Subcutaneous At Bedtime   Quantity:  15 mL   Refills:  2                Primary Care Provider Office Phone # Fax #    Racheal Swift [phone]       [address] [place] [address]  [facility] [address]        Equal Access to Services     ROSA ELENA [facility] AH: Hadii niurka Bedoya, waaxda luroxanna, qaybta kaalmada adejessee, yolanda lopez. So [facility] [phone].    ATENCIÓN: Si habla español, tiene a rodrigues disposición servicios gratuitos de asistencia lingüística. Llame al [phone].    We comply with applicable federal civil rights laws and [place] laws. We do not discriminate on the basis of race, color, national origin, age, disability, sex, sexual orientation, or gender identity.            Thank you!     Thank you for choosing [facility]  for your care. Our goal is always to provide you with excellent care. Hearing back from our  patients is one way we can continue to improve our services. Please take a few minutes to complete the written survey that you may receive in the mail after your visit with us. Thank you!             Your Updated Medication List - Protect others around you: Learn how to safely use, store and throw away your medicines at www.disposemymeds.org.          This list is accurate as of: 1/22/18  8:38 AM.  Always use your most recent med list.                   Brand Name Dispense Instructions for use Diagnosis    ammonium lactate 12 % cream    LAC-HYDRIN    385 g    Apply topically 2 times daily as needed for dry skin    Venous stasis, Type 2 diabetes, controlled, with neuropathy (H)       ascorbic acid 500 MG Tabs     30 tablet    Take 1 tablet (500 mg) by mouth 2 times daily    Ulcer of right lower leg, with fat layer exposed (H)       ASPIRIN PO      Take 81 mg by mouth daily        blood glucose monitoring lancets     3 Box    Use to test blood sugars 2 as directed.    Type 2 diabetes, uncontrolled, with neuropathy (H)       blood glucose monitoring test strip    ONETOUCH ULTRA    60 strip    Use to test blood sugars 2 times daily or as directed.    Type 2 diabetes mellitus (H)       clopidogrel 75 MG tablet    PLAVIX    30 tablet    Take 1 tablet (75 mg) by mouth daily    Peripheral vascular disease, unspecified       COLACE PO      Take 100 mg by mouth daily        ferrous sulfate 325 (65 FE) MG tablet    IRON    60 tablet    Take 1 tablet (325 mg) by mouth 2 times daily    Peripheral vascular disease, unspecified       gentamicin 0.1 % cream    GARAMYCIN    30 g    Apply topically daily To right leg ulcer.    Ulcer of right lower leg, with fat layer exposed (H), Chronic venous hypertension with ulcer involving right side (H), Type 2 diabetes, controlled, with neuropathy (H)       insulin glargine 100 UNIT/ML injection    LANTUS SOLOSTAR    15 mL    Inject 25 Units Subcutaneous At Bedtime    Type 2 diabetes mellitus  "with diabetic peripheral angiopathy without gangrene, with long-term current use of insulin (H)       insulin pen needle 31G X 8 MM    B-D U/F    100 each    Use 1 daily o as directed    Diabetes mellitus, type II (H)       LISINOPRIL PO      Take 20 mg by mouth 2 times daily        nateglinide 120 MG tablet    STARLIX    90 tablet    TAKE 1 TABLET BY MOUTH THREE TIMES DAILY BEFORE MEALS    Type 2 diabetes, controlled, with neuropathy (H)       OPTIFOAM 6\"X6\" Pads     1 each    1 Box once a week    Ulcer of right leg, with fat layer exposed (H)       * order for DME     2 each    Please measure and distribute 1 pair of 20mm Hg - 30mm Hg knee high ULCER CARE open or closed toe compression stockings.  Patient has a size 13 foot and please take this into consideration.  [place]st or equivalent    Varicose veins of lower extremities with other complications, Venous stasis ulcer of right lower extremity (H)       * order for DME     3 each    Please measure and distribute 1 pair of 20mmHg - 30mmHg knee high open or closed toe compression stockings. Jobst ultrasheer or equivalent.    Varicose veins of both lower extremities with complications       * order for DME     2 each    Please measure and distribute 1 pair of 30mmHg - 40mmHg knee high open toe ulcercare compression stockings. Jobst ultrasheer or equivalent.    Varicose veins of bilateral lower extremities with other complications       sildenafil 50 MG tablet    VIAGRA    10 tablet    Take 1 tablet (50 mg) by mouth daily as needed for erectile dysfunction    Vasculogenic erectile dysfunction, unspecified vasculogenic erectile dysfunction type       silver sulfADIAZINE 1 % cream    SILVADENE    85 g    Apply topically daily To affected areas on right foot and leg.    Ulcer of right lower leg, with fat layer exposed (H), Chronic venous hypertension with ulcer involving right side (H), Type 2 diabetes, controlled, with neuropathy (H)       simvastatin 10 MG tablet    " ZOCOR    90 tablet    Take 1 tablet (10 mg) by mouth At Bedtime    Type 2 diabetes, controlled, with neuropathy (H)       sitagliptin 100 MG tablet    JANUVIA    90 tablet    Take 1 tablet (100 mg) by mouth daily    Type 2 diabetes, controlled, with neuropathy (H)       triamcinolone 0.1 % cream    KENALOG    30 g    Apply sparingly to left heel daily.    Dermatitis of left foot       VITAMIN D (CHOLECALCIFEROL) PO      Take 1,000 Units by mouth 2 times daily        * Notice:  This list has 3 medication(s) that are the same as other medications prescribed for you. Read the directions carefully, and ask your doctor or other care provider to review them with you.
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
## 2018-01-22 NOTE — PATIENT INSTRUCTIONS
Preparing for Your Surgery      Name:  Amos Walker   MRN:  [id number]   :  1945   Today's Date:  2018     Arriving for surgery:We will call with date  time  and location and will review medications when we have a surgery date   Surgery date:    Arrival time:    Please come to    What can I eat or drink?  -  You may have solid food or milk products until 8 hours prior to your surgery.  -  You may have water, apple juice or 7up/Sprite until 2 hours prior to your surgery.    Which medicines can I take?  -  Do NOT take these medications in the morning, the day of surgery:        -  Please take these medications the day of surgery:        How do I prepare myself?  -  Take two showers: one the night before surgery; and one the morning of surgery.         Use Scrubcare or Hibiclens to wash from neck down.  You may use your own shampoo and conditioner. No other hair products.   -  Do NOT use lotion, powder, deodorant, or antiperspirant the day of your surgery.  -  Do NOT wear any makeup, fingernail polish or jewelry.  -Do not bring your own medications to the hospital, except for inhalers and eye drops.  -  Bring your ID and insurance card.    Questions or Concerns:  If you have questions or concerns, please call the  Preoperative Assessment Center, Monday-Friday 7AM-7PM:  [phone]            AFTER YOUR SURGERY  Breathing exercises   Breathing exercises help you recover faster. Take deep breaths and let the air out slowly. This will:     Help you wake up after surgery.    Help prevent complications like pneumonia.  Preventing complications will help you go home sooner.   We may give you a breathing device (incentive spirometer) to encourage you to breathe deeply.   Nausea and vomiting   You may feel sick to your stomach after surgery; if so, let your nurse know.    Pain control:  After surgery, you may have pain. Our goal is to help you manage your pain. Pain medicine will help you feel comfortable  enough to do activities that will help you heal.  These activities may include breathing exercises, walking and physical therapy.   To help your health care team treat your pain we will ask: 1) If you have pain  2) where it is located 3) describe your pain in your words  Methods of pain control include medications given by mouth, vein or by nerve block for some surgeries.  We may give you a pain control pump that will:  1) Deliver the medicine through a tube placed in your vein  2) Control the amount of medicine you receive  3) Allow you to push a button to deliver a dose of pain medicine  Sequential Compression Device (SCD) or Pneumo Boots:  You may need to wear SCD S on your legs or feet. These are wraps connected to a machine that pumps in air and releases it. The repeated pumping helps prevent blood clots from forming.

## 2018-01-24 DIAGNOSIS — E11.40 TYPE 2 DIABETES, CONTROLLED, WITH NEUROPATHY (H): ICD-10-CM

## 2018-01-25 ENCOUNTER — HOSPITAL ENCOUNTER (OUTPATIENT)
Dept: MRI IMAGING | Facility: CLINIC | Age: 73
Discharge: HOME | End: 2018-01-25
Attending: RADIOLOGY | Admitting: RADIOLOGY
Payer: MEDICARE

## 2018-01-25 DIAGNOSIS — I73.9 PAD (PERIPHERAL ARTERY DISEASE) (H): ICD-10-CM

## 2018-01-25 PROCEDURE — 93017 CV STRESS TEST TRACING ONLY: CPT

## 2018-01-25 PROCEDURE — 40000065 ZZH STATISTIC EKG NON-CHARGEABLE

## 2018-01-25 PROCEDURE — 75563 CARD MRI W/STRESS IMG & DYE: CPT | Mod: 26 | Performed by: INTERNAL MEDICINE

## 2018-01-25 PROCEDURE — 25000128 H RX IP 250 OP 636: Performed by: INTERNAL MEDICINE

## 2018-01-25 PROCEDURE — 25000128 H RX IP 250 OP 636: Performed by: RADIOLOGY

## 2018-01-25 PROCEDURE — A9585 GADOBUTROL INJECTION: HCPCS | Performed by: RADIOLOGY

## 2018-01-25 PROCEDURE — 93005 ELECTROCARDIOGRAM TRACING: CPT

## 2018-01-25 PROCEDURE — 75563 CARD MRI W/STRESS IMG & DYE: CPT

## 2018-01-25 RX ORDER — GADOBUTROL 604.72 MG/ML
10 INJECTION INTRAVENOUS ONCE
Status: COMPLETED | OUTPATIENT
Start: 2018-01-25 | End: 2018-01-25

## 2018-01-25 RX ORDER — SIMVASTATIN 10 MG
10 TABLET ORAL AT BEDTIME
Qty: 90 TABLET | Refills: 1 | Status: SHIPPED | OUTPATIENT
Start: 2018-01-25 | End: 2018-02-21

## 2018-01-25 RX ORDER — ALBUTEROL SULFATE 90 UG/1
2 AEROSOL, METERED RESPIRATORY (INHALATION) EVERY 5 MIN PRN
Status: DISCONTINUED | OUTPATIENT
Start: 2018-01-25 | End: 2018-01-26 | Stop reason: HOSPADM

## 2018-01-25 RX ORDER — ACYCLOVIR 200 MG/1
0-1 CAPSULE ORAL
Status: DISCONTINUED | OUTPATIENT
Start: 2018-01-25 | End: 2018-01-26 | Stop reason: HOSPADM

## 2018-01-25 RX ORDER — REGADENOSON 0.08 MG/ML
0.4 INJECTION, SOLUTION INTRAVENOUS ONCE
Status: COMPLETED | OUTPATIENT
Start: 2018-01-25 | End: 2018-01-25

## 2018-01-25 RX ORDER — AMINOPHYLLINE 25 MG/ML
100 INJECTION, SOLUTION INTRAVENOUS ONCE
Status: COMPLETED | OUTPATIENT
Start: 2018-01-25 | End: 2018-01-25

## 2018-01-25 RX ORDER — DIAZEPAM 5 MG
5 TABLET ORAL EVERY 30 MIN PRN
Status: DISCONTINUED | OUTPATIENT
Start: 2018-01-25 | End: 2018-01-26 | Stop reason: HOSPADM

## 2018-01-25 RX ADMIN — AMINOPHYLLINE 100 MG: 25 INJECTION, SOLUTION INTRAVENOUS at 12:21

## 2018-01-25 RX ADMIN — REGADENOSON 0.4 MG: 0.08 INJECTION, SOLUTION INTRAVENOUS at 12:11

## 2018-01-25 RX ADMIN — GADOBUTROL 10 ML: 604.72 INJECTION INTRAVENOUS at 11:39

## 2018-01-25 NOTE — PROGRESS NOTES
Pt here for MRI stress. Pt denies any caffeine within the last 12 hours. Lung sounds clear. EKG NSR.  Pt's CR and GFR were checked prior to scan. Pt tolerated the dose without any adverse effects. Aminophylline 100 mg IV was given to reverse. Pt will continued to be monitored throughout the scan. VSS.

## 2018-01-26 ENCOUNTER — OFFICE VISIT (OUTPATIENT)
Dept: PODIATRY | Facility: CLINIC | Age: 73
End: 2018-01-26
Payer: MEDICARE

## 2018-01-26 VITALS — SYSTOLIC BLOOD PRESSURE: 124 MMHG | HEART RATE: 101 BPM | DIASTOLIC BLOOD PRESSURE: 58 MMHG | OXYGEN SATURATION: 97 %

## 2018-01-26 DIAGNOSIS — L97.912 ULCER OF RIGHT LOWER EXTREMITY WITH FAT LAYER EXPOSED (H): Primary | ICD-10-CM

## 2018-01-26 DIAGNOSIS — E11.49 TYPE II OR UNSPECIFIED TYPE DIABETES MELLITUS WITH NEUROLOGICAL MANIFESTATIONS, NOT STATED AS UNCONTROLLED(250.60) (H): ICD-10-CM

## 2018-01-26 DIAGNOSIS — L97.512 SKIN ULCER OF RIGHT FOOT WITH FAT LAYER EXPOSED (H): ICD-10-CM

## 2018-01-26 DIAGNOSIS — I87.8 VENOUS STASIS: ICD-10-CM

## 2018-01-26 PROCEDURE — 99213 OFFICE O/P EST LOW 20 MIN: CPT | Performed by: PODIATRIST
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
## 2018-01-26 NOTE — MR AVS SNAPSHOT
After Visit Summary   1/26/2018    Amos Walker    MRN: [id number]           Patient Information     Date Of Birth          1945        Visit Information        Provider Department      1/26/2018 10:00 AM Brayan Mcclain DPM [facility]        Today's Diagnoses     Ulcer of right lower extremity with fat layer exposed (H)    -  1    Skin ulcer of right foot with fat layer exposed (H)        Type II or unspecified type diabetes mellitus with neurological manifestations, not stated as uncontrolled(250.60) (H)        Venous stasis          Care Instructions    Thanks for coming today.  [facility] Clinic  [address] [place] [address]    To schedule future appointments in Ortho Clinic, you may call [phone].    To schedule ordered imaging by your Provider: Call [facility] at [phone]    blabfeed available online at:   [email or web address]    Please call if any further questions or concerns [phone] and ask for the Orthopedic Department. Clinic hours 8 am to 5 pm.    Return to clinic if symptoms worsen.            Follow-ups after your visit        Your next 10 appointments already scheduled     Jan 30, 2018 10:00 AM CST   Return Visit with Brayan Mcclain DPM   [facility] ([facility])    [address] [place] [id number] [phone]            Feb 01, 2018  1:30 PM CST   (Arrive by 1:15 PM)   New Vascular Visit with Anita Stock MD   [facility] ([facility])    [address] [place] [address]   [phone]            Feb 06, 2018  9:30 AM CST   Return Visit with Brayan Mcclain DPM   [facility] ([facility])    [address] [place] [id number] [phone]            Apr 27, 2018 10:25 AM CDT   (Arrive by 10:10 AM)   Return Visit with Racheal Swift MD     [facility] ([facility])    [address]  4th Floor  [facility] [address] [phone]            Jun 20, 2018 10:30 AM CDT   RETURN RETINA with Jen Aranda MD   [facility] ([facility])    [address]  [address]  [address]  [facility] [address]   [phone]              Future tests that were ordered for you today     Open Future Orders        Priority Expected Expires Ordered    MR Cardiac w Constrast and Stress Routine  1/25/2019 1/25/2018            Who to contact     If you have questions or need follow up information about today's clinic visit or your schedule please contact [facility] directly at [phone].  Normal or non-critical lab and imaging results will be communicated to you by [employer], letter or phone within 4 business days after the clinic has received the results. If you do not hear from us within 7 days, please contact the clinic through [employer] or phone. If you have a critical or abnormal lab result, we will notify you by phone as soon as possible.  Submit refill requests through [employer] or call your pharmacy and they will forward the refill request to us. Please allow 3 business days for your refill to be completed.          Additional Information About Your Visit        [employer] Information     [employer] gives you secure access to your electronic health record. If you see a primary care provider, you can also send messages to your care team and make appointments. If you have questions, please call your primary care clinic.  If you do not have a primary care provider, please call [phone] and they will assist you.      [employer] is an electronic gateway that provides easy, online access to your medical records. With [employer], you can request a clinic appointment, read your test results, renew a prescription or communicate with your care team.     To access your  existing account, please contact your [facility] or call [phone] for assistance.        Care EveryWhere ID     This is your Care EveryWhere ID. This could be used by other organizations to access your [place] medical records  [id number]        Your Vitals Were     Pulse Pulse Oximetry                101 97%           Blood Pressure from Last 3 Encounters:   01/26/18 124/58   01/22/18 135/71   01/16/18 155/66    Weight from Last 3 Encounters:   01/22/18 80.2 kg (176 lb 11.2 oz)   12/29/17 80 kg (176 lb 6.4 oz)   10/19/17 83.5 kg (184 lb)              Today, you had the following     No orders found for display         Today's Medication Changes          These changes are accurate as of 1/26/18 12:13 PM.  If you have any questions, ask your nurse or doctor.               These medicines have changed or have updated prescriptions.        Dose/Directions    clopidogrel 75 MG tablet   Commonly known as:  PLAVIX   This may have changed:  when to take this   Used for:  Peripheral vascular disease, unspecified        Dose:  75 mg   Take 1 tablet (75 mg) by mouth daily   Quantity:  30 tablet   Refills:  11       gentamicin 0.1 % cream   Commonly known as:  GARAMYCIN   This may have changed:    - when to take this  - reasons to take this  - additional instructions   Used for:  Ulcer of right lower leg, with fat layer exposed (H), Chronic venous hypertension with ulcer involving right side (H), Type 2 diabetes, controlled, with neuropathy (H)        Apply topically daily To right leg ulcer.   Quantity:  30 g   Refills:  5       insulin glargine 100 UNIT/ML injection   Commonly known as:  LANTUS SOLOSTAR   This may have changed:  when to take this   Used for:  Type 2 diabetes mellitus with diabetic peripheral angiopathy without gangrene, with long-term current use of insulin (H)        Dose:  25 Units   Inject 25 Units Subcutaneous At Bedtime   Quantity:  15 mL   Refills:  2                 Primary Care Provider Office Phone # Fax #    Racheal Swift [phone]       [address] [place] [address]  [facility] [address]        Equal Access to Services     SAMSON MELTON : Nataliia webb danao Somaameali, waaxda luqadaha, qaybta kaalmada adejessee, yolanda blair laMickhollie lopez. So [facility] [place] [phone].    ATENCIÓN: Si habla español, tiene a rodrigues disposición servicios gratuitos de asistencia lingüística. Llame al [phone].    We comply with applicable federal civil rights laws and [place] laws. We do not discriminate on the basis of race, color, national origin, age, disability, sex, sexual orientation, or gender identity.            Thank you!     Thank you for choosing [facility]  for your care. Our goal is always to provide you with excellent care. Hearing back from our patients is one way we can continue to improve our services. Please take a few minutes to complete the written survey that you may receive in the mail after your visit with us. Thank you!             Your Updated Medication List - Protect others around you: Learn how to safely use, store and throw away your medicines at www.disposemymeds.org.          This list is accurate as of 1/26/18 12:14 PM.  Always use your most recent med list.                   Brand Name Dispense Instructions for use Diagnosis    ammonium lactate 12 % cream    LAC-HYDRIN    385 g    Apply topically 2 times daily as needed for dry skin    Venous stasis, Type 2 diabetes, controlled, with neuropathy (H)       ascorbic acid 500 MG Tabs     30 tablet    Take 1 tablet (500 mg) by mouth 2 times daily    Ulcer of right lower leg, with fat layer exposed (H)       ASPIRIN PO      Take 81 mg by mouth daily        blood glucose monitoring lancets     3 Box    Use to test blood sugars 2 as directed.    Type 2 diabetes, uncontrolled, with neuropathy (H)       blood glucose monitoring test strip    ONETOUCH ULTRA    60 strip    Use  "to test blood sugars 2 times daily or as directed.    Type 2 diabetes mellitus (H)       clopidogrel 75 MG tablet    PLAVIX    30 tablet    Take 1 tablet (75 mg) by mouth daily    Peripheral vascular disease, unspecified       COLACE PO      Take 100 mg by mouth daily        ferrous sulfate 325 (65 FE) MG tablet    IRON    60 tablet    Take 1 tablet (325 mg) by mouth 2 times daily    Peripheral vascular disease, unspecified       gentamicin 0.1 % cream    GARAMYCIN    30 g    Apply topically daily To right leg ulcer.    Ulcer of right lower leg, with fat layer exposed (H), Chronic venous hypertension with ulcer involving right side (H), Type 2 diabetes, controlled, with neuropathy (H)       insulin glargine 100 UNIT/ML injection    LANTUS SOLOSTAR    15 mL    Inject 25 Units Subcutaneous At Bedtime    Type 2 diabetes mellitus with diabetic peripheral angiopathy without gangrene, with long-term current use of insulin (H)       insulin pen needle 31G X 8 MM    B-D U/F    100 each    Use 1 daily o as directed    Diabetes mellitus, type II (H)       LISINOPRIL PO      Take 20 mg by mouth 2 times daily        nateglinide 120 MG tablet    STARLIX    90 tablet    TAKE 1 TABLET BY MOUTH THREE TIMES DAILY BEFORE MEALS    Type 2 diabetes, controlled, with neuropathy (H)       OPTIFOAM 6\"X6\" Pads     1 each    1 Box once a week    Ulcer of right leg, with fat layer exposed (H)       * order for DME     2 each    Please measure and distribute 1 pair of 20mm Hg - 30mm Hg knee high ULCER CARE open or closed toe compression stockings.  Patient has a size 13 foot and please take this into consideration.  Jobst or equivalent    Varicose veins of lower extremities with other complications, Venous stasis ulcer of right lower extremity (H)       * order for DME     3 each    Please measure and distribute 1 pair of 20mmHg - 30mmHg knee high open or closed toe compression stockings. Jobst ultrasheer or equivalent.    Varicose veins of " both lower extremities with complications       * order for DME     2 each    Please measure and distribute 1 pair of 30mmHg - 40mmHg knee high open toe ulcercare compression stockings. Jobst ultrasheer or equivalent.    Varicose veins of bilateral lower extremities with other complications       sildenafil 50 MG tablet    VIAGRA    10 tablet    Take 1 tablet (50 mg) by mouth daily as needed for erectile dysfunction    Vasculogenic erectile dysfunction, unspecified vasculogenic erectile dysfunction type       silver sulfADIAZINE 1 % cream    SILVADENE    85 g    Apply topically daily To affected areas on right foot and leg.    Ulcer of right lower leg, with fat layer exposed (H), Chronic venous hypertension with ulcer involving right side (H), Type 2 diabetes, controlled, with neuropathy (H)       * simvastatin 10 MG tablet    ZOCOR    90 tablet    Take 1 tablet (10 mg) by mouth At Bedtime    Type 2 diabetes, controlled, with neuropathy (H)       * simvastatin 10 MG tablet    ZOCOR    90 tablet    Take 1 tablet (10 mg) by mouth At Bedtime    Type 2 diabetes, controlled, with neuropathy (H)       sitagliptin 100 MG tablet    JANUVIA    90 tablet    Take 1 tablet (100 mg) by mouth daily    Type 2 diabetes, controlled, with neuropathy (H)       triamcinolone 0.1 % cream    KENALOG    30 g    Apply sparingly to left heel daily.    Dermatitis of left foot       VITAMIN D (CHOLECALCIFEROL) PO      Take 1,000 Units by mouth 2 times daily        * Notice:  This list has 5 medication(s) that are the same as other medications prescribed for you. Read the directions carefully, and ask your doctor or other care provider to review them with you.

## 2018-01-26 NOTE — NURSING NOTE
"Amos Walker's goals for this visit include: Right leg recheck   He requests these members of his care team be copied on today's visit information: yes    PCP: Racheal Swift    Referring Provider:  Referred Self, MD  No address on file    Chief Complaint   Patient presents with     RECHECK     right leg wound check        Initial There were no vitals taken for this visit. Estimated body mass index is 22.38 kg/(m^2) as calculated from the following:    Height as of 1/22/18: 1.892 m (6' 2.5\").    Weight as of 1/22/18: 80.2 kg (176 lb 11.2 oz).  Medication Reconciliation: complete    "

## 2018-01-26 NOTE — PATIENT INSTRUCTIONS
Thanks for coming today.  Ortho[facility]Sports Medicine Clinic  [address] [place] [address]    To schedule future appointments in Ortho Clinic, you may call [phone].    To schedule ordered imaging by your Provider: Call [facility] at [phone]    [employer] available online at:   [email or web address]    Please call if any further questions or concerns [phone] and ask for the Orthopedic Department. Clinic hours 8 am to 5 pm.    Return to clinic if symptoms worsen.
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
## 2018-01-27 LAB
INTERPRETATION ECG - MUSE: NORMAL
INTERPRETATION ECG - MUSE: NORMAL

## 2018-01-30 ENCOUNTER — OFFICE VISIT (OUTPATIENT)
Dept: PODIATRY | Facility: CLINIC | Age: 73
End: 2018-01-30
Payer: MEDICARE

## 2018-01-30 VITALS — SYSTOLIC BLOOD PRESSURE: 163 MMHG | DIASTOLIC BLOOD PRESSURE: 74 MMHG | OXYGEN SATURATION: 100 % | HEART RATE: 110 BPM

## 2018-01-30 DIAGNOSIS — E11.51 DIABETES MELLITUS WITH PERIPHERAL VASCULAR DISEASE (H): ICD-10-CM

## 2018-01-30 DIAGNOSIS — I87.8 VENOUS STASIS: ICD-10-CM

## 2018-01-30 DIAGNOSIS — L97.912 ULCER OF RIGHT LOWER EXTREMITY WITH FAT LAYER EXPOSED (H): Primary | ICD-10-CM

## 2018-01-30 PROCEDURE — 99213 OFFICE O/P EST LOW 20 MIN: CPT | Performed by: PODIATRIST

## 2018-01-30 NOTE — NURSING NOTE
"Amos Walker's goals for this visit include: Wound check   He requests these members of his care team be copied on today's visit information: yes    PCP: Racheal Swift    Referring Provider:  Referred Self, MD  No address on file    Chief Complaint   Patient presents with     RECHECK     wound check        Initial /74  Pulse 110  SpO2 100% Estimated body mass index is 22.38 kg/(m^2) as calculated from the following:    Height as of 1/22/18: 1.892 m (6' 2.5\").    Weight as of 1/22/18: 80.2 kg (176 lb 11.2 oz).  Medication Reconciliation: complete    "
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
## 2018-01-30 NOTE — MR AVS SNAPSHOT
After Visit Summary   1/30/2018    Amos Walker    MRN: [id number]           Patient Information     Date Of Birth          1945        Visit Information        Provider Department      1/30/2018 10:00 AM Brayan Mcclain DPM [facility]        Today's Diagnoses     Ulcer of right lower extremity with fat layer exposed (H)    -  1    Diabetes mellitus with peripheral vascular disease (H)          Care Instructions    Thanks for coming today.  Ortho/Sports Medicine Clinic  [address] [place] [address]    To schedule future appointments in Ortho Clinic, you may call [phone].    To schedule ordered imaging by your Provider: Call [facility] at [phone]    [employer] available online at:   [email or web address]    Please call if any further questions or concerns [phone] and ask for the Orthopedic Department. Clinic hours 8 am to 5 pm.    Return to clinic if symptoms worsen.            Follow-ups after your visit        Your next 10 appointments already scheduled     Feb 01, 2018  1:30 PM CST   (Arrive by 1:15 PM)   New Vascular Visit with Anita Stock MD   [facility] ([facility])    [address] [place] [id number] [phone]            Feb 06, 2018  9:30 AM CST   Return Visit with Brayan Mcclain DPM   [facility])    [address] [place] [address] [phone]            Feb 13, 2018  9:00 AM CST   Return Visit with Brayan Mcclain DPM   [facility] ([facility])    [address] [place] [id number] [phone]            Feb 20, 2018  9:30 AM CST   Return Visit with Brayan Mcclain DPM   [facility] ([facility])    [address] [place] [address]   [phone]            Feb 27, 2018  9:30 AM CST   Return  Visit with Brayan Mcclain DPM   [facility] ([facility])    [address] [place] [address]   [phone]            Mar 06, 2018 10:00 AM CST   Return Visit with Brayan Mcclain DPM   [facility] ([facility])    [address] [place] [address]   [phone]            Mar 13, 2018  9:30 AM CDT   Return Visit with Brayan Mcclain DPM   [facility] ([facility])    [address] [place] [address]   [phone]            Mar 20, 2018 10:00 AM CDT   Return Visit with Brayan Mcclain DPM   [facility] ([facility])    [address] [place] [address]   [phone]            Mar 27, 2018  9:30 AM CDT   Return Visit with Brayan Mcclain DPM   [facility] ([facility])    [address] [place] [address]   [phone]            Apr 27, 2018 10:25 AM CDT   (Arrive by 10:10 AM)   Return Visit with Racheal Swift MD   [facility] ([facility])    [address] [place] [address] [phone]              Who to contact     If you have questions or need follow up information about today's clinic visit or your schedule please contact [facility] directly at [phone].  Normal or non-critical lab and imaging results will be communicated to you by MyChart, letter or phone within 4 business days after the clinic has received the results. If you do not hear from us within 7 days, please contact the clinic through MyChart or phone. If you have a critical or abnormal lab result, we will notify you by phone as soon as possible.  Submit refill requests through [email or web address] or call your pharmacy and they will forward the refill request to us. Please allow 3 business days for your refill to be  completed.          Additional Information About Your Visit        [employer] Information     [employer] gives you secure access to your electronic health record. If you see a primary care provider, you can also send messages to your care team and make appointments. If you have questions, please call your primary care clinic.  If you do not have a primary care provider, please call [phone] and they will assist you.      [employer] is an electronic gateway that provides easy, online access to your medical records. With [employer], you can request a clinic appointment, read your test results, renew a prescription or communicate with your care team.     To access your existing account, please contact your [facility] or call [phone] for assistance.        Care EveryWhere ID     This is your Care EveryWhere ID. This could be used by other organizations to access your [place] medical records  [id number]        Your Vitals Were     Pulse Pulse Oximetry                110 100%           Blood Pressure from Last 3 Encounters:   01/30/18 163/74   01/26/18 124/58   01/22/18 135/71    Weight from Last 3 Encounters:   01/22/18 80.2 kg (176 lb 11.2 oz)   12/29/17 80 kg (176 lb 6.4 oz)   10/19/17 83.5 kg (184 lb)              Today, you had the following     No orders found for display         Today's Medication Changes          These changes are accurate as of 1/30/18 10:30 AM.  If you have any questions, ask your nurse or doctor.               These medicines have changed or have updated prescriptions.        Dose/Directions    clopidogrel 75 MG tablet   Commonly known as:  PLAVIX   This may have changed:  when to take this   Used for:  Peripheral vascular disease, unspecified        Dose:  75 mg   Take 1 tablet (75 mg) by mouth daily   Quantity:  30 tablet   Refills:  11       gentamicin 0.1 % cream   Commonly known as:  GARAMYCIN   This may have changed:    - when to take this  - reasons to take  this  - additional instructions   Used for:  Ulcer of right lower leg, with fat layer exposed (H), Chronic venous hypertension with ulcer involving right side (H), Type 2 diabetes, controlled, with neuropathy (H)        Apply topically daily To right leg ulcer.   Quantity:  30 g   Refills:  5       insulin glargine 100 UNIT/ML injection   Commonly known as:  LANTUS SOLOSTAR   This may have changed:  when to take this   Used for:  Type 2 diabetes mellitus with diabetic peripheral angiopathy without gangrene, with long-term current use of insulin (H)        Dose:  25 Units   Inject 25 Units Subcutaneous At Bedtime   Quantity:  15 mL   Refills:  2                Primary Care Provider Office Phone # Fax #    Racheal Swift [phone]       [address] [place] [address]        Equal Access to Services     SAMSON MELTON : Nataliia Bedoya, wabruno abebe, qacristopher kaalmakylee jeffery, yolanda duran . So [facility] [phone].    ATENCIÓN: Si habla español, tiene a rodrigues disposición servicios gratuitos de asistencia lingüística. Mary al [phone].    We comply with applicable federal civil rights laws and [place] laws. We do not discriminate on the basis of race, color, national origin, age, disability, sex, sexual orientation, or gender identity.            Thank you!     Thank you for choosing [facility]  for your care. Our goal is always to provide you with excellent care. Hearing back from our patients is one way we can continue to improve our services. Please take a few minutes to complete the written survey that you may receive in the mail after your visit with us. Thank you!             Your Updated Medication List - Protect others around you: Learn how to safely use, store and throw away your medicines at www.disposemymeds.org.          This list is accurate as of 1/30/18 10:30 AM.  Always use your most recent med list.                    Brand Name Dispense Instructions for use Diagnosis    ammonium lactate 12 % cream    LAC-HYDRIN    385 g    Apply topically 2 times daily as needed for dry skin    Venous stasis, Type 2 diabetes, controlled, with neuropathy (H)       ascorbic acid 500 MG Tabs     30 tablet    Take 1 tablet (500 mg) by mouth 2 times daily    Ulcer of right lower leg, with fat layer exposed (H)       ASPIRIN PO      Take 81 mg by mouth daily        blood glucose monitoring lancets     3 Box    Use to test blood sugars 2 as directed.    Type 2 diabetes, uncontrolled, with neuropathy (H)       blood glucose monitoring test strip    ONETOUCH ULTRA    60 strip    Use to test blood sugars 2 times daily or as directed.    Type 2 diabetes mellitus (H)       clopidogrel 75 MG tablet    PLAVIX    30 tablet    Take 1 tablet (75 mg) by mouth daily    Peripheral vascular disease, unspecified       COLACE PO      Take 100 mg by mouth daily        ferrous sulfate 325 (65 FE) MG tablet    IRON    60 tablet    Take 1 tablet (325 mg) by mouth 2 times daily    Peripheral vascular disease, unspecified       gentamicin 0.1 % cream    GARAMYCIN    30 g    Apply topically daily To right leg ulcer.    Ulcer of right lower leg, with fat layer exposed (H), Chronic venous hypertension with ulcer involving right side (H), Type 2 diabetes, controlled, with neuropathy (H)       insulin glargine 100 UNIT/ML injection    LANTUS SOLOSTAR    15 mL    Inject 25 Units Subcutaneous At Bedtime    Type 2 diabetes mellitus with diabetic peripheral angiopathy without gangrene, with long-term current use of insulin (H)       insulin pen needle 31G X 8 MM    B-D U/F    100 each    Use 1 daily o as directed    Diabetes mellitus, type II (H)       LISINOPRIL PO      Take 20 mg by mouth 2 times daily        nateglinide 120 MG tablet    STARLIX    90 tablet    TAKE 1 TABLET BY MOUTH THREE TIMES DAILY BEFORE MEALS    Type 2 diabetes, controlled, with neuropathy (H)  "      OPTIFOAM 6\"X6\" Pads     1 each    1 Box once a week    Ulcer of right leg, with fat layer exposed (H)       * order for DME     2 each    Please measure and distribute 1 pair of 20mm Hg - 30mm Hg knee high ULCER CARE open or closed toe compression stockings.  Patient has a size 13 foot and please take this into consideration.  Jobst or equivalent    Varicose veins of lower extremities with other complications, Venous stasis ulcer of right lower extremity (H)       * order for DME     3 each    Please measure and distribute 1 pair of 20mmHg - 30mmHg knee high open or closed toe compression stockings. Jobst ultrasheer or equivalent.    Varicose veins of both lower extremities with complications       * order for DME     2 each    Please measure and distribute 1 pair of 30mmHg - 40mmHg knee high open toe ulcercare compression stockings. Jobst ultrasheer or equivalent.    Varicose veins of bilateral lower extremities with other complications       sildenafil 50 MG tablet    VIAGRA    10 tablet    Take 1 tablet (50 mg) by mouth daily as needed for erectile dysfunction    Vasculogenic erectile dysfunction, unspecified vasculogenic erectile dysfunction type       silver sulfADIAZINE 1 % cream    SILVADENE    85 g    Apply topically daily To affected areas on right foot and leg.    Ulcer of right lower leg, with fat layer exposed (H), Chronic venous hypertension with ulcer involving right side (H), Type 2 diabetes, controlled, with neuropathy (H)       * simvastatin 10 MG tablet    ZOCOR    90 tablet    Take 1 tablet (10 mg) by mouth At Bedtime    Type 2 diabetes, controlled, with neuropathy (H)       * simvastatin 10 MG tablet    ZOCOR    90 tablet    Take 1 tablet (10 mg) by mouth At Bedtime    Type 2 diabetes, controlled, with neuropathy (H)       sitagliptin 100 MG tablet    JANUVIA    90 tablet    Take 1 tablet (100 mg) by mouth daily    Type 2 diabetes, controlled, with neuropathy (H)       triamcinolone 0.1 % " cream    KENALOG    30 g    Apply sparingly to left heel daily.    Dermatitis of left foot       VITAMIN D (CHOLECALCIFEROL) PO      Take 1,000 Units by mouth 2 times daily        * Notice:  This list has 5 medication(s) that are the same as other medications prescribed for you. Read the directions carefully, and ask your doctor or other care provider to review them with you.
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
## 2018-01-30 NOTE — PATIENT INSTRUCTIONS
Thanks for coming today.  Ortho/Sports Medicine Clinic  [address] [place] [address]    To schedule future appointments in Ortho Clinic, you may call [phone].    To schedule ordered imaging by your Provider: Call [facility] at [phone]    [email or web address] available online at:   [email or web address]    Please call if any further questions or concerns [phone] and ask for the Orthopedic Department. Clinic hours 8 am to 5 pm.    Return to clinic if symptoms worsen.

## 2018-02-01 ENCOUNTER — OFFICE VISIT (OUTPATIENT)
Dept: VASCULAR SURGERY | Facility: CLINIC | Age: 73
End: 2018-02-01
Payer: MEDICARE

## 2018-02-01 VITALS
DIASTOLIC BLOOD PRESSURE: 69 MMHG | SYSTOLIC BLOOD PRESSURE: 163 MMHG | HEART RATE: 109 BPM | RESPIRATION RATE: 20 BRPM | OXYGEN SATURATION: 98 %

## 2018-02-01 DIAGNOSIS — I70.231: Primary | ICD-10-CM

## 2018-02-01 ASSESSMENT — PAIN SCALES - GENERAL: PAINLEVEL: MILD PAIN (3)
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
## 2018-02-01 NOTE — NURSING NOTE
Chief Complaint   Patient presents with     Consult     Consult PAD, right leg pain        Vitals:    02/01/18 1319   BP: 163/69   BP Location: Left arm   Pulse: 109   Resp: 20   SpO2: 98%       There is no height or weight on file to calculate BMI.           Trish Fernandez LPN

## 2018-02-01 NOTE — MR AVS SNAPSHOT
After Visit Summary   2/1/2018    Amos Walker    MRN: [id number]           Patient Information     Date Of Birth          1945        Visit Information        Provider Department      2/1/2018 1:30 PM Anita Stock MD 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      Your next 10 appointments already scheduled     Feb 06, 2018  9:30 AM CST   [facility]    [address] [facility] [address]   [phone]            Feb 13, 2018  9:00 AM CST   [facility]clain [facility]   [facility]    [address] [facility] [address]   [phone]            Feb 20, 2018  9:30 AM CST   [facility]   [facility]    [address] [facility] [address]   [phone]            Feb 27, 2018  9:30 AM CST   [facility]   [facility]    [address] [facility] [address]   [phone]            Mar 06, 2018 10:00 AM CST   [facility]   [facility]    [address] [facility] [address]   [phone]            Mar 13, 2018  9:30 AM CDT   [facility]ain [facility]   [facility]    [address] [facility] [address]   [phone]            Mar 20, 2018 10:00 AM CDT   [facility] Bryaan [facility]   [facility]    [address] [place] [address]  [address]  [facility] [address]   [phone]            Mar 27, 2018  9:30 AM CDT   Return Visit with Brayan Mcclain DPM   [facility] ([facility])    [address] [place] [address]   [phone]            Apr 27, 2018 10:25 AM CDT   (Arrive by 10:10 AM)   Return Visit with Racheal Swift MD   [facility] ([facility])    [address] [facility]  4th Floor  [facility] [id number] [phone]            Jun 20, 2018 10:30 AM CDT   RETURN RETINA with Jen [facility] MD   [facility]    [address] [facility]  [address]  [facility] [id number] [phone]              Who to contact     Please call your clinic at [phone] to:    Ask questions about your health    Make or cancel appointments    Discuss your medicines    Learn about your test results    Speak to your doctor   If you have compliments or concerns about an experience at your clinic, or if you wish to file a complaint, please contact [facility] Patient Relations at [phone] or email us at [email or web address]         Additional Information About Your Visit        MyChart Information     [employer] gives you secure access to your electronic health record. If you see a primary care provider, you can also send messages to your care team and make appointments. If you have questions, please call your primary care clinic.  If you do not have a primary care provider, please call [phone] and they will assist you.      [employer] is an electronic gateway that provides easy, online access to your medical records. With [employer] you can request a clinic appointment, read your test results, renew a prescription or communicate with your care team.     To access your existing account, please contact your [facility] or call  [phone] for assistance.        Care EveryWhere ID     This is your Care EveryWhere ID. This could be used by other organizations to access your [facility] medical records  [id number]        Your Vitals Were     Pulse Respirations Pulse Oximetry             109 20 98%          Blood Pressure from Last 3 Encounters:   02/01/18 163/69   01/30/18 163/74   01/26/18 124/58    Weight from Last 3 Encounters:   01/22/18 176 lb 11.2 oz   12/29/17 176 lb 6.4 oz   10/19/17 184 lb              Today, you had the following     No orders found for display         Today's Medication Changes          These changes are accurate as of 2/1/18 11:59 PM.  If you have any questions, ask your nurse or doctor.               These medicines have changed or have updated prescriptions.        Dose/Directions    clopidogrel 75 MG tablet   Commonly known as:  PLAVIX   This may have changed:  when to take this   Used for:  Peripheral vascular disease, unspecified        Dose:  75 mg   Take 1 tablet (75 mg) by mouth daily   Quantity:  30 tablet   Refills:  11       gentamicin 0.1 % cream   Commonly known as:  GARAMYCIN   This may have changed:    - when to take this  - reasons to take this  - additional instructions   Used for:  Ulcer of right lower leg, with fat layer exposed (H), Chronic venous hypertension with ulcer involving right side (H), Type 2 diabetes, controlled, with neuropathy (H)        Apply topically daily To right leg ulcer.   Quantity:  30 g   Refills:  5       insulin glargine 100 UNIT/ML injection   Commonly known as:  LANTUS SOLOSTAR   This may have changed:  when to take this   Used for:  Type 2 diabetes mellitus with diabetic peripheral angiopathy without gangrene, with long-term current use of insulin (H)        Dose:  25 Units   Inject 25 Units Subcutaneous At Bedtime   Quantity:  15 mL   Refills:  2                Primary Care Provider Office Phone # Fax #    Racheal Swift [phone]        [address] [facility] [address]  [facility] [address]        Equal Access to Services     SAMSON MELTON : Hadii aad ku hadjeannieyomaira Bedoya, wajunitoda andrae, qacristopher njmakylee jeffery, yolanda lunaantoniromulo lopez. So [facility] [phone].    ATENCIÓN: Si habla español, tiene a rodrigues disposición servicios gratuitos de asistencia lingüística. Llame al [phone].    We comply with applicable federal civil rights laws and [place] laws. We do not discriminate on the basis of race, color, national origin, age, disability, sex, sexual orientation, or gender identity.            Thank you!     Thank you for choosing [facility]  for your care. Our goal is always to provide you with excellent care. Hearing back from our patients is one way we can continue to improve our services. Please take a few minutes to complete the written survey that you may receive in the mail after your visit with us. Thank you!             Your Updated Medication List - Protect others around you: Learn how to safely use, store and throw away your medicines at www.disposemymeds.org.          This list is accurate as of 2/1/18 11:59 PM.  Always use your most recent med list.                   Brand Name Dispense Instructions for use Diagnosis    ammonium lactate 12 % cream    LAC-HYDRIN    385 g    Apply topically 2 times daily as needed for dry skin    Venous stasis, Type 2 diabetes, controlled, with neuropathy (H)       ascorbic acid 500 MG Tabs     30 tablet    Take 1 tablet (500 mg) by mouth 2 times daily    Ulcer of right lower leg, with fat layer exposed (H)       ASPIRIN PO      Take 81 mg by mouth daily        blood glucose monitoring lancets     3 Box    Use to test blood sugars 2 as directed.    Type 2 diabetes, uncontrolled, with neuropathy (H)       blood glucose monitoring test strip    ONETOUCH ULTRA    60 strip    Use to test blood sugars 2 times daily or as directed.    Type 2 diabetes mellitus (H)       clopidogrel 75 MG  "tablet    PLAVIX    30 tablet    Take 1 tablet (75 mg) by mouth daily    Peripheral vascular disease, unspecified       COLACE PO      Take 100 mg by mouth daily        ferrous sulfate 325 (65 FE) MG tablet    IRON    60 tablet    Take 1 tablet (325 mg) by mouth 2 times daily    Peripheral vascular disease, unspecified       gentamicin 0.1 % cream    GARAMYCIN    30 g    Apply topically daily To right leg ulcer.    Ulcer of right lower leg, with fat layer exposed (H), Chronic venous hypertension with ulcer involving right side (H), Type 2 diabetes, controlled, with neuropathy (H)       insulin glargine 100 UNIT/ML injection    LANTUS SOLOSTAR    15 mL    Inject 25 Units Subcutaneous At Bedtime    Type 2 diabetes mellitus with diabetic peripheral angiopathy without gangrene, with long-term current use of insulin (H)       insulin pen needle 31G X 8 MM    B-D U/F    100 each    Use 1 daily o as directed    Diabetes mellitus, type II (H)       LISINOPRIL PO      Take 20 mg by mouth 2 times daily        nateglinide 120 MG tablet    STARLIX    90 tablet    TAKE 1 TABLET BY MOUTH THREE TIMES DAILY BEFORE MEALS    Type 2 diabetes, controlled, with neuropathy (H)       OPTIFOAM 6\"X6\" Pads     1 each    1 Box once a week    Ulcer of right leg, with fat layer exposed (H)       * order for DME     2 each    Please measure and distribute 1 pair of 20mm Hg - 30mm Hg knee high ULCER CARE open or closed toe compression stockings.  Patient has a size 13 foot and please take this into consideration.  [employer] or equivalent    Varicose veins of lower extremities with other complications, Venous stasis ulcer of right lower extremity (H)       * order for DME     3 each    Please measure and distribute 1 pair of 20mmHg - 30mmHg knee high open or closed toe compression stockings. Jobst ultrasheer or equivalent.    Varicose veins of both lower extremities with complications       * order for DME     2 each    Please measure and distribute 1 " pair of 30mmHg - 40mmHg knee high open toe ulcercare compression stockings. Jobst ultrasheer or equivalent.    Varicose veins of bilateral lower extremities with other complications       sildenafil 50 MG tablet    VIAGRA    10 tablet    Take 1 tablet (50 mg) by mouth daily as needed for erectile dysfunction    Vasculogenic erectile dysfunction, unspecified vasculogenic erectile dysfunction type       silver sulfADIAZINE 1 % cream    SILVADENE    85 g    Apply topically daily To affected areas on right foot and leg.    Ulcer of right lower leg, with fat layer exposed (H), Chronic venous hypertension with ulcer involving right side (H), Type 2 diabetes, controlled, with neuropathy (H)       * simvastatin 10 MG tablet    ZOCOR    90 tablet    Take 1 tablet (10 mg) by mouth At Bedtime    Type 2 diabetes, controlled, with neuropathy (H)       * simvastatin 10 MG tablet    ZOCOR    90 tablet    Take 1 tablet (10 mg) by mouth At Bedtime    Type 2 diabetes, controlled, with neuropathy (H)       sitagliptin 100 MG tablet    JANUVIA    90 tablet    Take 1 tablet (100 mg) by mouth daily    Type 2 diabetes, controlled, with neuropathy (H)       triamcinolone 0.1 % cream    KENALOG    30 g    Apply sparingly to left heel daily.    Dermatitis of left foot       VITAMIN D (CHOLECALCIFEROL) PO      Take 1,000 Units by mouth 2 times daily        * Notice:  This list has 5 medication(s) that are the same as other medications prescribed for you. Read the directions carefully, and ask your doctor or other care provider to review them with you.
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
## 2018-02-05 NOTE — PROGRESS NOTES
I saw the patient with the resident.  I agree with the resident note and plan of care attached.  After discussion with Dr Mcclain I agree with the indication to intervene to make expensive biologic dressing more likely to succeed.  Small reversible are on stress test is a major concern to the patient perhaps because one of his friends recently had a major MI.  His priority is to have his heart evaluated.  I will send him to one of our cardiologists for this.  In my opinion, the role of coronary imaging with an asymptomatic (although not very active) is not very clear and may be driven by the patient.  Regarding the implications of the stress test findings and limb revascularization, I think we should avoid GA and I think, having imaged him in the room with an ultrasound, that I can endarterectomize his SFA proximally under local and then perform angiointervention distally.  I will see him back in two weeks to plan for this.  This patient was very very anxious and I spent a lot of time discussing things with him.      Anita Stock MD  I spent >50% of this 45 min visit in counseling

## 2018-02-06 ENCOUNTER — OFFICE VISIT (OUTPATIENT)
Dept: PODIATRY | Facility: CLINIC | Age: 73
End: 2018-02-06
Payer: MEDICARE

## 2018-02-06 VITALS — HEART RATE: 101 BPM | OXYGEN SATURATION: 98 % | DIASTOLIC BLOOD PRESSURE: 60 MMHG | SYSTOLIC BLOOD PRESSURE: 140 MMHG

## 2018-02-06 DIAGNOSIS — L97.912 ULCER OF RIGHT LOWER LEG, WITH FAT LAYER EXPOSED (H): ICD-10-CM

## 2018-02-06 DIAGNOSIS — L97.512 SKIN ULCER OF RIGHT FOOT WITH FAT LAYER EXPOSED (H): ICD-10-CM

## 2018-02-06 DIAGNOSIS — E11.40 TYPE 2 DIABETES, CONTROLLED, WITH NEUROPATHY (H): ICD-10-CM

## 2018-02-06 DIAGNOSIS — E11.51 DIABETES MELLITUS WITH PERIPHERAL VASCULAR DISEASE (H): ICD-10-CM

## 2018-02-06 DIAGNOSIS — L97.912 ULCER OF RIGHT LOWER EXTREMITY WITH FAT LAYER EXPOSED (H): Primary | ICD-10-CM

## 2018-02-06 DIAGNOSIS — I87.8 VENOUS STASIS: ICD-10-CM

## 2018-02-06 DIAGNOSIS — E11.49 TYPE II OR UNSPECIFIED TYPE DIABETES MELLITUS WITH NEUROLOGICAL MANIFESTATIONS, NOT STATED AS UNCONTROLLED(250.60) (H): ICD-10-CM

## 2018-02-06 DIAGNOSIS — L30.9 DERMATITIS OF LEFT FOOT: ICD-10-CM

## 2018-02-06 PROCEDURE — 97597 DBRDMT OPN WND 1ST 20 CM/<: CPT | Performed by: PODIATRIST

## 2018-02-06 RX ORDER — SILVER SULFADIAZINE 10 MG/G
CREAM TOPICAL DAILY
Qty: 85 G | Refills: 5 | Status: SHIPPED | OUTPATIENT
Start: 2018-02-06 | End: 2018-08-14

## 2018-02-06 RX ORDER — TRIAMCINOLONE ACETONIDE 1 MG/G
CREAM TOPICAL
Qty: 30 G | Refills: 1 | Status: SHIPPED | OUTPATIENT
Start: 2018-02-06 | End: 2018-02-21

## 2018-02-06 ASSESSMENT — PAIN SCALES - GENERAL: PAINLEVEL: MILD PAIN (3)
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
## 2018-02-06 NOTE — PATIENT INSTRUCTIONS
Thanks for coming today.  Ortho[facility] Medicine Clinic  [address] [place] [address]    To schedule future appointments in Ortho Clinic, you may call [phone].    To schedule ordered imaging by your provider:   Call Central Imaging [phone]    To schedule an injection ordered by your provider:  Call Central Imaging Injection scheduling line: [phone]  [email or web address] available online at:  [email or web address]    Please call if any further questions or concerns ([phone]).  Clinic hours 8 am to 5 pm.    Return to clinic (call) if symptoms worsen or fail to improve.

## 2018-02-06 NOTE — MR AVS SNAPSHOT
After Visit Summary   2018    Amos Walker    MRN: [id number]           Patient Information     Date Of Birth          1945        Visit Information        Provider Department      2018 9:30 AM Brayan Mcclain DPM [facility]        Today's Diagnoses     Ulcer of right lower extremity with fat layer exposed (H)    -  1    Skin ulcer of right foot with fat layer exposed (H)        Venous stasis        Type II or unspecified type diabetes mellitus with neurological manifestations, not stated as uncontrolled(250.60) (H)        Diabetes mellitus with peripheral vascular disease (H)        Dermatitis of left foot        Ulcer of right lower leg, with fat layer exposed (H)        Chronic venous hypertension with ulcer involving right side (H)        Type 2 diabetes, controlled, with neuropathy (H)          Care Instructions    Thanks for coming today.  Ortho[facility] Medicine Clinic  [address] [place] [address]    To schedule future appointments in Ortho Clinic, you may call [phone].    To schedule ordered imaging by your provider:   Call Central Imaging [phone]    To schedule an injection ordered by your provider:  Call Central Imaging Injection scheduling line: [phone]  Kojami available online at:  [email or web address]    Please call if any further questions or concerns ([phone]).  Clinic hours 8 am to 5 pm.    Return to clinic (call) if symptoms worsen or fail to improve.            Follow-ups after your visit        Follow-up notes from your care team     Return in about 1 week (around 2018).      Your next 10 appointments already scheduled     2018  9:00 AM CST   Return Visit with DONNELL Pena [facility] ([facility])    [address] [place] [address] [phone]            2018  9:30 AM CST   Return Visit with DONNELL Pena  [facility] ([facility])    [address] [facility] [address]   [phone]            Feb 27, 2018  9:30 AM CST   Return Visit with Brayan Mcclain DPM   [facility] ([facility])    [address] [facility] [address]   [phone]            Mar 06, 2018 10:00 AM CST   Return Visit with Brayan Mcclain DPM   [facility] ([facility])    [address] [place] [address]   [phone]            Mar 13, 2018  9:30 AM CDT   Return Visit with Brayan Mcclain DPM   [facility] ([facility])    [address] [place] [address]   [phone]            Mar 20, 2018 10:00 AM CDT   Return Visit with Brayan Mcclain DPM   [facility] ([facility])    [address] [place] [address]   [phone]            Mar 27, 2018  9:30 AM CDT   Return Visit with Brayan Mcclain DPM   [facility] ([facility])    [address] [place] [address]   [phone]            Apr 27, 2018 10:25 AM CDT   (Arrive by 10:10 AM)   Return Visit with Racheal Swift MD   [facility] ([facility])    [address] [place] [address]  [address] [facility] [id number] [phone]            Jun 20, 2018 10:30 AM CDT   RETURN RETINA with Jen Aranda MD   [facility] ([facility])    [address]  [address] [id number] [phone]              Who to contact     If you have questions or need follow up information about today's clinic visit or your schedule please contact [facility] directly at [phone].  Normal or non-critical lab and imaging results will be communicated to you by MyChart, letter or phone within 4  business days after the clinic has received the results. If you do not hear from us within 7 days, please contact the clinic through APR or phone. If you have a critical or abnormal lab result, we will notify you by phone as soon as possible.  Submit refill requests through APR or call your pharmacy and they will forward the refill request to us. Please allow 3 business days for your refill to be completed.          Additional Information About Your Visit        APR Information     APR gives you secure access to your electronic health record. If you see a primary care provider, you can also send messages to your care team and make appointments. If you have questions, please call your primary care clinic.  If you do not have a primary care provider, please call [phone] and they will assist you.      APR is an electronic gateway that provides easy, online access to your medical records. With APR, you can request a clinic appointment, read your test results, renew a prescription or communicate with your care team.     To access your existing account, please contact your [facility] or call [phone] for assistance.        Care EveryWhere ID     This is your Care EveryWhere ID. This could be used by other organizations to access your [facility] medical records  [id number]        Your Vitals Were     Pulse Pulse Oximetry                101 98%           Blood Pressure from Last 3 Encounters:   02/06/18 140/60   02/01/18 163/69   01/30/18 163/74    Weight from Last 3 Encounters:   01/22/18 80.2 kg (176 lb 11.2 oz)   12/29/17 80 kg (176 lb 6.4 oz)   10/19/17 83.5 kg (184 lb)              We Performed the Following     DEBRIDEMENT WOUND UP TO 20 SQ CM          Today's Medication Changes          These changes are accurate as of 2/6/18 11:59 PM.  If you have any questions, ask your nurse or doctor.               These medicines have changed or have updated  prescriptions.        Dose/Directions    clopidogrel 75 MG tablet   Commonly known as:  PLAVIX   This may have changed:  when to take this   Used for:  Peripheral vascular disease, unspecified        Dose:  75 mg   Take 1 tablet (75 mg) by mouth daily   Quantity:  30 tablet   Refills:  11       gentamicin 0.1 % cream   Commonly known as:  GARAMYCIN   This may have changed:    - when to take this  - reasons to take this  - additional instructions   Used for:  Ulcer of right lower leg, with fat layer exposed (H), Chronic venous hypertension with ulcer involving right side (H), Type 2 diabetes, controlled, with neuropathy (H)        Apply topically daily To right leg ulcer.   Quantity:  30 g   Refills:  5       insulin glargine 100 UNIT/ML injection   Commonly known as:  LANTUS SOLOSTAR   This may have changed:  when to take this   Used for:  Type 2 diabetes mellitus with diabetic peripheral angiopathy without gangrene, with long-term current use of insulin (H)        Dose:  25 Units   Inject 25 Units Subcutaneous At Bedtime   Quantity:  15 mL   Refills:  2            Where to get your medicines      These medications were sent to [facility] [address] [place] [address] [address] AT [place] [address], [facility] [address]     Phone:  [phone]     silver sulfADIAZINE 1 % cream    triamcinolone 0.1 % cream                Primary Care Provider Office Phone # Fax #    Racheal Swift [phone]       [address] [address]        Equal Access to Services     SAMSON MELTON AH: Nataliia cortezo Somaameali, waaxda luqadaha, qaybta kaalmada adeegyada, yolanda lopez. So [facility] [phone].    ATENCIÓN: Si habla rhondaañol, tiene a rodrigues disposición servicios gratuitos de asistencia lingüística. Llame al [phone].    We comply with applicable federal civil rights laws and [place] laws. We do not discriminate on the  basis of race, color, national origin, age, disability, sex, sexual orientation, or gender identity.            Thank you!     Thank you for choosing [facility]  for your care. Our goal is always to provide you with excellent care. Hearing back from our patients is one way we can continue to improve our services. Please take a few minutes to complete the written survey that you may receive in the mail after your visit with us. Thank you!             Your Updated Medication List - Protect others around you: Learn how to safely use, store and throw away your medicines at www.disposemymeds.org.          This list is accurate as of 2/6/18 11:59 PM.  Always use your most recent med list.                   Brand Name Dispense Instructions for use Diagnosis    ammonium lactate 12 % cream    LAC-HYDRIN    385 g    Apply topically 2 times daily as needed for dry skin    Venous stasis, Type 2 diabetes, controlled, with neuropathy (H)       ascorbic acid 500 MG Tabs     30 tablet    Take 1 tablet (500 mg) by mouth 2 times daily    Ulcer of right lower leg, with fat layer exposed (H)       ASPIRIN PO      Take 81 mg by mouth daily        blood glucose monitoring lancets     3 Box    Use to test blood sugars 2 as directed.    Type 2 diabetes, uncontrolled, with neuropathy (H)       blood glucose monitoring test strip    ONETOUCH ULTRA    60 strip    Use to test blood sugars 2 times daily or as directed.    Type 2 diabetes mellitus (H)       clopidogrel 75 MG tablet    PLAVIX    30 tablet    Take 1 tablet (75 mg) by mouth daily    Peripheral vascular disease, unspecified       COLACE PO      Take 100 mg by mouth daily        ferrous sulfate 325 (65 FE) MG tablet    IRON    60 tablet    Take 1 tablet (325 mg) by mouth 2 times daily    Peripheral vascular disease, unspecified       gentamicin 0.1 % cream    GARAMYCIN    30 g    Apply topically daily To right leg ulcer.    Ulcer of right lower leg, with fat layer  "exposed (H), Chronic venous hypertension with ulcer involving right side (H), Type 2 diabetes, controlled, with neuropathy (H)       insulin glargine 100 UNIT/ML injection    LANTUS SOLOSTAR    15 mL    Inject 25 Units Subcutaneous At Bedtime    Type 2 diabetes mellitus with diabetic peripheral angiopathy without gangrene, with long-term current use of insulin (H)       insulin pen needle 31G X 8 MM    B-D U/F    100 each    Use 1 daily o as directed    Diabetes mellitus, type II (H)       LISINOPRIL PO      Take 20 mg by mouth 2 times daily        nateglinide 120 MG tablet    STARLIX    90 tablet    TAKE 1 TABLET BY MOUTH THREE TIMES DAILY BEFORE MEALS    Type 2 diabetes, controlled, with neuropathy (H)       OPTIFOAM 6\"X6\" Pads     1 each    1 Box once a week    Ulcer of right leg, with fat layer exposed (H)       * order for DME     2 each    Please measure and distribute 1 pair of 20mm Hg - 30mm Hg knee high ULCER CARE open or closed toe compression stockings.  Patient has a size 13 foot and please take this into consideration.  Jobst or equivalent    Varicose veins of lower extremities with other complications, Venous stasis ulcer of right lower extremity (H)       * order for DME     3 each    Please measure and distribute 1 pair of 20mmHg - 30mmHg knee high open or closed toe compression stockings. Jobst ultrasheer or equivalent.    Varicose veins of both lower extremities with complications       * order for DME     2 each    Please measure and distribute 1 pair of 30mmHg - 40mmHg knee high open toe ulcercare compression stockings. Jobst ultrasheer or equivalent.    Varicose veins of bilateral lower extremities with other complications       sildenafil 50 MG tablet    VIAGRA    10 tablet    Take 1 tablet (50 mg) by mouth daily as needed for erectile dysfunction    Vasculogenic erectile dysfunction, unspecified vasculogenic erectile dysfunction type       silver sulfADIAZINE 1 % cream    SILVADENE    85 g    " Apply topically daily To affected areas on right foot and leg.    Ulcer of right lower leg, with fat layer exposed (H), Chronic venous hypertension with ulcer involving right side (H), Type 2 diabetes, controlled, with neuropathy (H)       * simvastatin 10 MG tablet    ZOCOR    90 tablet    Take 1 tablet (10 mg) by mouth At Bedtime    Type 2 diabetes, controlled, with neuropathy (H)       * simvastatin 10 MG tablet    ZOCOR    90 tablet    Take 1 tablet (10 mg) by mouth At Bedtime    Type 2 diabetes, controlled, with neuropathy (H)       sitagliptin 100 MG tablet    JANUVIA    90 tablet    Take 1 tablet (100 mg) by mouth daily    Type 2 diabetes, controlled, with neuropathy (H)       triamcinolone 0.1 % cream    KENALOG    30 g    Apply sparingly to left heel daily.    Dermatitis of left foot       VITAMIN D (CHOLECALCIFEROL) PO      Take 1,000 Units by mouth 2 times daily        * Notice:  This list has 5 medication(s) that are the same as other medications prescribed for you. Read the directions carefully, and ask your doctor or other care provider to review them with you.

## 2018-02-06 NOTE — NURSING NOTE
"Amos Walker's goals for this visit include: Recheck right leg ulcer  He requests these members of his care team be copied on today's visit information: yes    PCP: Racheal Swift    Referring Provider:  Referred Self, MD  No address on file    Chief Complaint   Patient presents with     RECHECK     Right leg ulcer       Initial /60  Pulse 101  SpO2 98% Estimated body mass index is 22.38 kg/(m^2) as calculated from the following:    Height as of 1/22/18: 1.892 m (6' 2.5\").    Weight as of 1/22/18: 80.2 kg (176 lb 11.2 oz).  Medication Reconciliation: complete    "

## 2018-02-13 ENCOUNTER — OFFICE VISIT (OUTPATIENT)
Dept: PODIATRY | Facility: CLINIC | Age: 73
End: 2018-02-13
Payer: MEDICARE

## 2018-02-13 VITALS — DIASTOLIC BLOOD PRESSURE: 60 MMHG | OXYGEN SATURATION: 98 % | HEART RATE: 105 BPM | SYSTOLIC BLOOD PRESSURE: 138 MMHG

## 2018-02-13 DIAGNOSIS — E11.49 TYPE II OR UNSPECIFIED TYPE DIABETES MELLITUS WITH NEUROLOGICAL MANIFESTATIONS, NOT STATED AS UNCONTROLLED(250.60) (H): ICD-10-CM

## 2018-02-13 DIAGNOSIS — L97.512 SKIN ULCER OF RIGHT FOOT WITH FAT LAYER EXPOSED (H): Primary | ICD-10-CM

## 2018-02-13 DIAGNOSIS — E11.51 DIABETES MELLITUS WITH PERIPHERAL VASCULAR DISEASE (H): ICD-10-CM

## 2018-02-13 DIAGNOSIS — L97.912 ULCER OF RIGHT LOWER EXTREMITY WITH FAT LAYER EXPOSED (H): ICD-10-CM

## 2018-02-13 PROCEDURE — 99213 OFFICE O/P EST LOW 20 MIN: CPT | Performed by: PODIATRIST

## 2018-02-13 ASSESSMENT — PAIN SCALES - GENERAL: PAINLEVEL: MODERATE PAIN (5)

## 2018-02-13 NOTE — MR AVS SNAPSHOT
After Visit Summary   2018    Amos Walker    MRN: [id number]           Patient Information     Date Of Birth          1945        Visit Information        Provider Department      2018 9:00 AM Brayan Mcclain DPM [facility]        Today's Diagnoses     Skin ulcer of right foot with fat layer exposed (H)    -  1    Ulcer of right lower extremity with fat layer exposed (H)        Type II or unspecified type diabetes mellitus with neurological manifestations, not stated as uncontrolled(250.60) (H)        Diabetes mellitus with peripheral vascular disease (H)          Care Instructions    Thanks for coming today.  [facility]  [address] [place] [id number]    To schedule future appointments in Ortho Clinic, you may call [phone].    To schedule ordered imaging by your provider:   Call Central Imaging [phone]    To schedule an injection ordered by your provider:  Call Central Imaging Injection scheduling line: [phone]  Chirp Interactive available online at:  [email or web address]    Please call if any further questions or concerns ([phone]).  Clinic hours 8 am to 5 pm.    Return to clinic (call) if symptoms worsen or fail to improve.            Follow-ups after your visit        Your next 10 appointments already scheduled     2018  9:30 AM CST   Return Visit with Brayan Mcclain DPM   [facility] ([facility]    [address] [place] [id number] [phone]            2018  9:30 AM CST   Return Visit with Brayan Mcclain DPM   [facility] ([facility])    [address] [place] [id number] [phone]            Mar 06, 2018 10:00 AM CST   Return Visit with Brayan Mcclain DPM   [facility] ([facility])    [address] [place] [address]    [phone]            Mar 13, 2018  9:30 AM CDT   Return Visit with Brayan Mcclain DPM   [facility]    [address] [facility] [address]   [phone]            Mar 20, 2018 10:00 AM CDT   Return Visit with Brayan Mcclain DPM   [facility]    [address] [facility] [address]   [phone]            Mar 27, 2018  9:30 AM CDT   Return Visit with Brayan Mcclain DPM   [facility]    [address] [facility] [address] [phone]            Apr 27, 2018 10:25 AM CDT   (Arrive by 10:10 AM)   Return Visit with Racheal Swift MD   [facility] ([facility])    [address] [facility] [address] [phone]            Jun 20, 2018 10:30 AM CDT   RETURN RETINA with Jen Aranda MD   [facility] ([facility])    [address]  [address] [address] [phone]              Who to contact     If you have questions or need follow up information about today's clinic visit or your schedule please contact [facility] directly at [phone].  Normal or non-critical lab and imaging results will be communicated to you by [employer], letter or phone within 4 business days after the clinic has received the results. If you do not hear from us within 7 days, please contact the clinic through [employer] or phone. If you have a critical or abnormal lab result, we will notify you by phone as soon as possible.  Submit refill requests through fromAtoB or call your pharmacy and they will forward the refill request to us. Please allow 3 business days for your refill to be completed.          Additional Information About Your Visit        Specialized Pharmaceuticalssharartaculous Information     fromAtoB gives you secure  access to your electronic health record. If you see a primary care provider, you can also send messages to your care team and make appointments. If you have questions, please call your primary care clinic.  If you do not have a primary care provider, please call [phone] and they will assist you.      Quantu[employer]ing is an electronic gateway that provides easy, online access to your medical records. With [employer], you can request a clinic appointment, read your test results, renew a prescription or communicate with your care team.     To access your existing account, please contact your [facility] or call [phone] for assistance.        Care EveryWhere ID     This is your Care EveryWhere ID. This could be used by other organizations to access your [place] medical records  [id number]        Your Vitals Were     Pulse Pulse Oximetry                105 98%           Blood Pressure from Last 3 Encounters:   02/13/18 138/60   02/06/18 140/60   02/01/18 163/69    Weight from Last 3 Encounters:   01/22/18 80.2 kg (176 lb 11.2 oz)   12/29/17 80 kg (176 lb 6.4 oz)   10/19/17 83.5 kg (184 lb)              Today, you had the following     No orders found for display         Today's Medication Changes          These changes are accurate as of 2/13/18 11:47 AM.  If you have any questions, ask your nurse or doctor.               These medicines have changed or have updated prescriptions.        Dose/Directions    clopidogrel 75 MG tablet   Commonly known as:  PLAVIX   This may have changed:  when to take this   Used for:  Peripheral vascular disease, unspecified        Dose:  75 mg   Take 1 tablet (75 mg) by mouth daily   Quantity:  30 tablet   Refills:  11       gentamicin 0.1 % cream   Commonly known as:  GARAMYCIN   This may have changed:    - when to take this  - reasons to take this  - additional instructions   Used for:  Ulcer of right lower leg, with fat layer exposed (H), Chronic venous  hypertension with ulcer involving right side (H), Type 2 diabetes, controlled, with neuropathy (H)        Apply topically daily To right leg ulcer.   Quantity:  30 g   Refills:  5       insulin glargine 100 UNIT/ML injection   Commonly known as:  LANTUS SOLOSTAR   This may have changed:  when to take this   Used for:  Type 2 diabetes mellitus with diabetic peripheral angiopathy without gangrene, with long-term current use of insulin (H)        Dose:  25 Units   Inject 25 Units Subcutaneous At Bedtime   Quantity:  15 mL   Refills:  2                Primary Care Provider Office Phone # Fax #    Racheal Swift [phone] [address] [address]        Equal Access to Services     SAMSON MELTON : Nataliia Bedoya, wabruno abebe, qacristopher kaalmakylee jeffery, yolanda lopez. So [facility] [phone].    ATENCIÓN: Si habla español, tiene a rodrigues disposición servicios gratuitos de asistencia lingüística. Llame al [phone].    We comply with applicable federal civil rights laws and [place] laws. We do not discriminate on the basis of race, color, national origin, age, disability, sex, sexual orientation, or gender identity.            Thank you!     Thank you for choosing [facility]  for your care. Our goal is always to provide you with excellent care. Hearing back from our patients is one way we can continue to improve our services. Please take a few minutes to complete the written survey that you may receive in the mail after your visit with us. Thank you!             Your Updated Medication List - Protect others around you: Learn how to safely use, store and throw away your medicines at www.disposemymeds.org.          This list is accurate as of 2/13/18 11:47 AM.  Always use your most recent med list.                   Brand Name Dispense Instructions for use Diagnosis    ammonium lactate 12 % cream    LAC-HYDRIN     "385 g    Apply topically 2 times daily as needed for dry skin    Venous stasis, Type 2 diabetes, controlled, with neuropathy (H)       ascorbic acid 500 MG Tabs     30 tablet    Take 1 tablet (500 mg) by mouth 2 times daily    Ulcer of right lower leg, with fat layer exposed (H)       ASPIRIN PO      Take 81 mg by mouth daily        blood glucose monitoring lancets     3 Box    Use to test blood sugars 2 as directed.    Type 2 diabetes, uncontrolled, with neuropathy (H)       blood glucose monitoring test strip    ONETOUCH ULTRA    60 strip    Use to test blood sugars 2 times daily or as directed.    Type 2 diabetes mellitus (H)       clopidogrel 75 MG tablet    PLAVIX    30 tablet    Take 1 tablet (75 mg) by mouth daily    Peripheral vascular disease, unspecified       COLACE PO      Take 100 mg by mouth daily        ferrous sulfate 325 (65 FE) MG tablet    IRON    60 tablet    Take 1 tablet (325 mg) by mouth 2 times daily    Peripheral vascular disease, unspecified       gentamicin 0.1 % cream    GARAMYCIN    30 g    Apply topically daily To right leg ulcer.    Ulcer of right lower leg, with fat layer exposed (H), Chronic venous hypertension with ulcer involving right side (H), Type 2 diabetes, controlled, with neuropathy (H)       insulin glargine 100 UNIT/ML injection    LANTUS SOLOSTAR    15 mL    Inject 25 Units Subcutaneous At Bedtime    Type 2 diabetes mellitus with diabetic peripheral angiopathy without gangrene, with long-term current use of insulin (H)       insulin pen needle 31G X 8 MM    B-D U/F    100 each    Use 1 daily o as directed    Diabetes mellitus, type II (H)       LISINOPRIL PO      Take 20 mg by mouth 2 times daily        nateglinide 120 MG tablet    STARLIX    90 tablet    TAKE 1 TABLET BY MOUTH THREE TIMES DAILY BEFORE MEALS    Type 2 diabetes, controlled, with neuropathy (H)       OPTIFOAM 6\"X6\" Pads     1 each    1 Box once a week    Ulcer of right leg, with fat layer exposed (H)       " * order for DME     2 each    Please measure and distribute 1 pair of 20mm Hg - 30mm Hg knee high ULCER CARE open or closed toe compression stockings.  Patient has a size 13 foot and please take this into consideration.  Jobst or equivalent    Varicose veins of lower extremities with other complications, Venous stasis ulcer of right lower extremity (H)       * order for DME     3 each    Please measure and distribute 1 pair of 20mmHg - 30mmHg knee high open or closed toe compression stockings. Jobst ultrasheer or equivalent.    Varicose veins of both lower extremities with complications       * order for DME     2 each    Please measure and distribute 1 pair of 30mmHg - 40mmHg knee high open toe ulcercare compression stockings. Jobst ultrasheer or equivalent.    Varicose veins of bilateral lower extremities with other complications       sildenafil 50 MG tablet    VIAGRA    10 tablet    Take 1 tablet (50 mg) by mouth daily as needed for erectile dysfunction    Vasculogenic erectile dysfunction, unspecified vasculogenic erectile dysfunction type       silver sulfADIAZINE 1 % cream    SILVADENE    85 g    Apply topically daily To affected areas on right foot and leg.    Ulcer of right lower leg, with fat layer exposed (H), Chronic venous hypertension with ulcer involving right side (H), Type 2 diabetes, controlled, with neuropathy (H)       * simvastatin 10 MG tablet    ZOCOR    90 tablet    Take 1 tablet (10 mg) by mouth At Bedtime    Type 2 diabetes, controlled, with neuropathy (H)       * simvastatin 10 MG tablet    ZOCOR    90 tablet    Take 1 tablet (10 mg) by mouth At Bedtime    Type 2 diabetes, controlled, with neuropathy (H)       sitagliptin 100 MG tablet    JANUVIA    90 tablet    Take 1 tablet (100 mg) by mouth daily    Type 2 diabetes, controlled, with neuropathy (H)       triamcinolone 0.1 % cream    KENALOG    30 g    Apply sparingly to left heel daily.    Dermatitis of left foot       VITAMIN D  (CHOLECALCIFEROL) PO      Take 1,000 Units by mouth 2 times daily        * Notice:  This list has 5 medication(s) that are the same as other medications prescribed for you. Read the directions carefully, and ask your doctor or other care provider to review them with you.
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
## 2018-02-13 NOTE — NURSING NOTE
"Amos Walker's goals for this visit include: Recheck right leg ulcer  He requests these members of his care team be copied on today's visit information: yes    PCP: Racheal Swift    Referring Provider:  Referred Self, MD  No address on file    Chief Complaint   Patient presents with     RECHECK     Right leg ulcer       Initial /60  Pulse 105  SpO2 98% Estimated body mass index is 22.38 kg/(m^2) as calculated from the following:    Height as of 1/22/18: 1.892 m (6' 2.5\").    Weight as of 1/22/18: 80.2 kg (176 lb 11.2 oz).  Medication Reconciliation: complete     "
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
## 2018-02-13 NOTE — PATIENT INSTRUCTIONS
Thanks for coming today.  [facility]  [address] [place] [address]    To schedule future appointments in Ortho Clinic, you may call [phone].    To schedule ordered imaging by your provider:   Call Central Imaging [phone]    To schedule an injection ordered by your provider:  Call Central Imaging Injection scheduling line: [phone]  Hyperpothart available online at:  [email or web address]    Please call if any further questions or concerns ([phone]).  Clinic hours 8 am to 5 pm.    Return to clinic (call) if symptoms worsen or fail to improve.

## 2018-02-20 ENCOUNTER — OFFICE VISIT (OUTPATIENT)
Dept: PODIATRY | Facility: CLINIC | Age: 73
End: 2018-02-20
Payer: MEDICARE

## 2018-02-20 VITALS — OXYGEN SATURATION: 98 % | HEART RATE: 106 BPM | DIASTOLIC BLOOD PRESSURE: 61 MMHG | SYSTOLIC BLOOD PRESSURE: 137 MMHG

## 2018-02-20 DIAGNOSIS — E11.9 TYPE 2 DIABETES, HBA1C GOAL < 7% (H): ICD-10-CM

## 2018-02-20 DIAGNOSIS — I73.9 PAD (PERIPHERAL ARTERY DISEASE) (H): Primary | ICD-10-CM

## 2018-02-20 DIAGNOSIS — E11.51 DIABETES MELLITUS WITH PERIPHERAL VASCULAR DISEASE (H): ICD-10-CM

## 2018-02-20 DIAGNOSIS — L97.912 ULCER OF RIGHT LOWER EXTREMITY WITH FAT LAYER EXPOSED (H): Primary | ICD-10-CM

## 2018-02-20 DIAGNOSIS — L97.512 SKIN ULCER OF RIGHT FOOT WITH FAT LAYER EXPOSED (H): ICD-10-CM

## 2018-02-20 DIAGNOSIS — T14.8XXA NON-HEALING NON-SURGICAL WOUND: ICD-10-CM

## 2018-02-20 DIAGNOSIS — E11.49 TYPE II OR UNSPECIFIED TYPE DIABETES MELLITUS WITH NEUROLOGICAL MANIFESTATIONS, NOT STATED AS UNCONTROLLED(250.60) (H): ICD-10-CM

## 2018-02-20 PROCEDURE — 99213 OFFICE O/P EST LOW 20 MIN: CPT | Performed by: PODIATRIST

## 2018-02-20 RX ORDER — CEPHALEXIN 500 MG/1
500 CAPSULE ORAL 2 TIMES DAILY
Qty: 28 CAPSULE | Refills: 0 | Status: SHIPPED | OUTPATIENT
Start: 2018-02-20 | End: 2018-03-13

## 2018-02-20 NOTE — MR AVS SNAPSHOT
After Visit Summary   2/20/2018    Amos Walker    MRN: [id number]           Patient Information     Date Of Birth          1945        Visit Information        Provider Department      2/20/2018 9:30 AM Brayan Mcclain DPM [facility]        Today's Diagnoses     Ulcer of right lower extremity with fat layer exposed (H)    -  1    Skin ulcer of right foot with fat layer exposed (H)        Type II or unspecified type diabetes mellitus with neurological manifestations, not stated as uncontrolled(250.60) (H)        Diabetes mellitus with peripheral vascular disease (H)          Care Instructions    Thanks for coming today.  [facility]  [address] [place] [address]    To schedule future appointments in Ortho Clinic, you may call [phone].    To schedule ordered imaging by your Provider: Call [facility] at [phone]    Tamion available online at:   [email or web address]    Please call if any further questions or concerns [phone] and ask for the Orthopedic Department. Clinic hours 8 am to 5 pm.    Return to clinic if symptoms worsen.            Follow-ups after your visit        Your next 10 appointments already scheduled     Feb 21, 2018 10:30 AM CST   New Visit with Rubio Chinchilla MD   [facility] ([facility])    [address] [place] [id number] [phone]            Feb 27, 2018  9:30 AM CST   Return Visit with Brayan Mcclain DPM   [facility] ([facility])    [address] [facility] [id number] [phone]            Mar 06, 2018 10:00 AM CST   Return Visit with DONNELL Pena [facility] ([facility])    [address] [facility] [id number] [phone]            Mar 13, 2018  9:30 AM CDT   Return Visit with Brayan Mcclain DPM   [facility]  [facility] ([facility])    [address] [facility] [address]   [phone]            Mar 20, 2018 10:00 AM CDT   Return Visit with Brayan Mcclain DPM   [facility] ([facility])    [address] [facility] [address]   [phone]            Mar 27, 2018  9:30 AM CDT   Return Visit with Brayan Mcclain DPM   [facility] ([facility])    [address] [facility] [address]   [phone]            Apr 27, 2018 10:25 AM CDT   (Arrive by 10:10 AM)   Return Visit with Racheal Swift MD   [facility] ([facility])    [address] [address]   [phone]            Jun 20, 2018 10:30 AM CDT   RETURN RETINA with Jne Aranda MD   [facility] ([facility])    [address]  [facility] [address] [phone]              Who to contact     If you have questions or need follow up information about today's clinic visit or your schedule please contact [facility] directly at [phone].  Normal or non-critical lab and imaging results will be communicated to you by MyChart, letter or phone within 4 business days after the clinic has received the results. If you do not hear from us within 7 days, please contact the clinic through MyChart or phone. If you have a critical or abnormal lab result, we will notify you by phone as soon as possible.  Submit refill requests through [employer] or call your pharmacy and they will forward the refill request to us. Please allow 3 business days for your refill to be completed.          Additional Information About Your Visit        [employer] Information     [employer] gives you secure access to your electronic health record. If you see a primary care provider, you can also send messages  to your care team and make appointments. If you have questions, please call your primary care clinic.  If you do not have a primary care provider, please call [phone] and they will assist you.      [employer] is an electronic gateway that provides easy, online access to your medical records. With [employer], you can request a clinic appointment, read your test results, renew a prescription or communicate with your care team.     To access your existing account, please contact your [facility] or call [phone] for assistance.        Care EveryWhere ID     This is your Care EveryWhere ID. This could be used by other organizations to access your [facility] medical records  [id number]        Your Vitals Were     Pulse Pulse Oximetry                106 98%           Blood Pressure from Last 3 Encounters:   02/20/18 137/61   02/13/18 138/60   02/06/18 140/60    Weight from Last 3 Encounters:   01/22/18 80.2 kg (176 lb 11.2 oz)   12/29/17 80 kg (176 lb 6.4 oz)   10/19/17 83.5 kg (184 lb)              Today, you had the following     No orders found for display         Today's Medication Changes          These changes are accurate as of 2/20/18 10:10 AM.  If you have any questions, ask your nurse or doctor.               Start taking these medicines.        Dose/Directions    cephALEXin 500 MG capsule   Commonly known as:  KEFLEX   Used for:  Ulcer of right lower extremity with fat layer exposed (H), Skin ulcer of right foot with fat layer exposed (H), Type II or unspecified type diabetes mellitus with neurological manifestations, not stated as uncontrolled(250.60) (H), Diabetes mellitus with peripheral vascular disease (H)        Dose:  500 mg   Take 1 capsule (500 mg) by mouth 2 times daily   Quantity:  28 capsule   Refills:  0         These medicines have changed or have updated prescriptions.        Dose/Directions    clopidogrel 75 MG tablet   Commonly known as:  PLAVIX   This may have  changed:  when to take this   Used for:  Peripheral vascular disease, unspecified        Dose:  75 mg   Take 1 tablet (75 mg) by mouth daily   Quantity:  30 tablet   Refills:  11       gentamicin 0.1 % cream   Commonly known as:  GARAMYCIN   This may have changed:    - when to take this  - reasons to take this  - additional instructions   Used for:  Ulcer of right lower leg, with fat layer exposed (H), Chronic venous hypertension with ulcer involving right side (H), Type 2 diabetes, controlled, with neuropathy (H)        Apply topically daily To right leg ulcer.   Quantity:  30 g   Refills:  5       insulin glargine 100 UNIT/ML injection   Commonly known as:  LANTUS SOLOSTAR   This may have changed:  when to take this   Used for:  Type 2 diabetes mellitus with diabetic peripheral angiopathy without gangrene, with long-term current use of insulin (H)        Dose:  25 Units   Inject 25 Units Subcutaneous At Bedtime   Quantity:  15 mL   Refills:  2            Where to get your medicines      These medications were sent to [facility] [address] [place] [address] [address] AT [place] [id number], [facility] [address]     Phone:  [phone]     cephALEXin 500 MG capsule                Primary Care Provider Office Phone # Fax #    Racheal Swift [phone]       [address] [address]        Equal Access to Services     SAMSON MELTON AH: Nataliia cortezo Soger, waaxda luqadaha, qaybta kaalmada jose d, yolanda lopez. So [facility] [phone].    ATENCIÓN: Si habla español, tiene a rodrigues disposición servicios gratuitos de asistencia lingüística. Mary al [phone].    We comply with applicable federal civil rights laws and [place] laws. We do not discriminate on the basis of race, color, national origin, age, disability, sex, sexual orientation, or gender identity.            Thank you!     Thank you for  [facility]  for your care. Our goal is always to provide you with excellent care. Hearing back from our patients is one way we can continue to improve our services. Please take a few minutes to complete the written survey that you may receive in the mail after your visit with us. Thank you!             Your Updated Medication List - Protect others around you: Learn how to safely use, store and throw away your medicines at www.disposemymeds.org.          This list is accurate as of 2/20/18 10:10 AM.  Always use your most recent med list.                   Brand Name Dispense Instructions for use Diagnosis    ammonium lactate 12 % cream    LAC-HYDRIN    385 g    Apply topically 2 times daily as needed for dry skin    Venous stasis, Type 2 diabetes, controlled, with neuropathy (H)       ascorbic acid 500 MG Tabs     30 tablet    Take 1 tablet (500 mg) by mouth 2 times daily    Ulcer of right lower leg, with fat layer exposed (H)       ASPIRIN PO      Take 81 mg by mouth daily        blood glucose monitoring lancets     3 Box    Use to test blood sugars 2 as directed.    Type 2 diabetes, uncontrolled, with neuropathy (H)       blood glucose monitoring test strip    ONETOUCH ULTRA    60 strip    Use to test blood sugars 2 times daily or as directed.    Type 2 diabetes mellitus (H)       cephALEXin 500 MG capsule    KEFLEX    28 capsule    Take 1 capsule (500 mg) by mouth 2 times daily    Ulcer of right lower extremity with fat layer exposed (H), Skin ulcer of right foot with fat layer exposed (H), Type II or unspecified type diabetes mellitus with neurological manifestations, not stated as uncontrolled(250.60) (H), Diabetes mellitus with peripheral vascular disease (H)       clopidogrel 75 MG tablet    PLAVIX    30 tablet    Take 1 tablet (75 mg) by mouth daily    Peripheral vascular disease, unspecified       COLACE PO      Take 100 mg by mouth daily        ferrous sulfate 325 (65 FE) MG  "tablet    IRON    60 tablet    Take 1 tablet (325 mg) by mouth 2 times daily    Peripheral vascular disease, unspecified       gentamicin 0.1 % cream    GARAMYCIN    30 g    Apply topically daily To right leg ulcer.    Ulcer of right lower leg, with fat layer exposed (H), Chronic venous hypertension with ulcer involving right side (H), Type 2 diabetes, controlled, with neuropathy (H)       insulin glargine 100 UNIT/ML injection    LANTUS SOLOSTAR    15 mL    Inject 25 Units Subcutaneous At Bedtime    Type 2 diabetes mellitus with diabetic peripheral angiopathy without gangrene, with long-term current use of insulin (H)       insulin pen needle 31G X 8 MM    B-D U/F    100 each    Use 1 daily o as directed    Diabetes mellitus, type II (H)       LISINOPRIL PO      Take 20 mg by mouth 2 times daily        nateglinide 120 MG tablet    STARLIX    90 tablet    TAKE 1 TABLET BY MOUTH THREE TIMES DAILY BEFORE MEALS    Type 2 diabetes, controlled, with neuropathy (H)       OPTIFOAM 6\"X6\" Pads     1 each    1 Box once a week    Ulcer of right leg, with fat layer exposed (H)       * order for DME     2 each    Please measure and distribute 1 pair of 20mm Hg - 30mm Hg knee high ULCER CARE open or closed toe compression stockings.  Patient has a size 13 foot and please take this into consideration.  Jobst or equivalent    Varicose veins of lower extremities with other complications, Venous stasis ulcer of right lower extremity (H)       * order for DME     3 each    Please measure and distribute 1 pair of 20mmHg - 30mmHg knee high open or closed toe compression stockings. Jobst ultrasheer or equivalent.    Varicose veins of both lower extremities with complications       * order for DME     2 each    Please measure and distribute 1 pair of 30mmHg - 40mmHg knee high open toe ulcercare compression stockings. Jobst ultrasheer or equivalent.    Varicose veins of bilateral lower extremities with other complications       sildenafil " 50 MG tablet    VIAGRA    10 tablet    Take 1 tablet (50 mg) by mouth daily as needed for erectile dysfunction    Vasculogenic erectile dysfunction, unspecified vasculogenic erectile dysfunction type       silver sulfADIAZINE 1 % cream    SILVADENE    85 g    Apply topically daily To affected areas on right foot and leg.    Ulcer of right lower leg, with fat layer exposed (H), Chronic venous hypertension with ulcer involving right side (H), Type 2 diabetes, controlled, with neuropathy (H)       * simvastatin 10 MG tablet    ZOCOR    90 tablet    Take 1 tablet (10 mg) by mouth At Bedtime    Type 2 diabetes, controlled, with neuropathy (H)       * simvastatin 10 MG tablet    ZOCOR    90 tablet    Take 1 tablet (10 mg) by mouth At Bedtime    Type 2 diabetes, controlled, with neuropathy (H)       sitagliptin 100 MG tablet    JANUVIA    90 tablet    Take 1 tablet (100 mg) by mouth daily    Type 2 diabetes, controlled, with neuropathy (H)       triamcinolone 0.1 % cream    KENALOG    30 g    Apply sparingly to left heel daily.    Dermatitis of left foot       VITAMIN D (CHOLECALCIFEROL) PO      Take 1,000 Units by mouth 2 times daily        * Notice:  This list has 5 medication(s) that are the same as other medications prescribed for you. Read the directions carefully, and ask your doctor or other care provider to review them with you.
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
## 2018-02-20 NOTE — PATIENT INSTRUCTIONS
Thanks for coming today.  Ortho/Sports Medicine Clinic  [address] [place] [address]    To schedule future appointments in Ortho Clinic, you may call [phone].    To schedule ordered imaging by your Provider: Call [facility] at [phone]    [employer] available online at:   [email or web address]    Please call if any further questions or concerns [phone] and ask for the Orthopedic Department. Clinic hours 8 am to 5 pm.    Return to clinic if symptoms worsen.
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
## 2018-02-21 ENCOUNTER — OFFICE VISIT (OUTPATIENT)
Dept: CARDIOLOGY | Facility: CLINIC | Age: 73
End: 2018-02-21
Payer: MEDICARE

## 2018-02-21 ENCOUNTER — RADIANT APPOINTMENT (OUTPATIENT)
Dept: ULTRASOUND IMAGING | Facility: CLINIC | Age: 73
End: 2018-02-21
Attending: INTERNAL MEDICINE
Payer: MEDICARE

## 2018-02-21 VITALS
OXYGEN SATURATION: 100 % | WEIGHT: 171.13 LBS | HEART RATE: 105 BPM | BODY MASS INDEX: 21.68 KG/M2 | DIASTOLIC BLOOD PRESSURE: 70 MMHG | SYSTOLIC BLOOD PRESSURE: 127 MMHG

## 2018-02-21 DIAGNOSIS — I73.9 PAD (PERIPHERAL ARTERY DISEASE) (H): ICD-10-CM

## 2018-02-21 DIAGNOSIS — D64.9 ANEMIA, UNSPECIFIED TYPE: ICD-10-CM

## 2018-02-21 DIAGNOSIS — L97.519 ULCER OF RIGHT FOOT, UNSPECIFIED ULCER STAGE (H): ICD-10-CM

## 2018-02-21 DIAGNOSIS — I73.9 PAD (PERIPHERAL ARTERY DISEASE) (H): Primary | ICD-10-CM

## 2018-02-21 DIAGNOSIS — R94.39 POSITIVE CARDIAC STRESS TEST: ICD-10-CM

## 2018-02-21 DIAGNOSIS — I77.9 BILATERAL CAROTID ARTERY DISEASE (H): ICD-10-CM

## 2018-02-21 DIAGNOSIS — R55 SYNCOPE, UNSPECIFIED SYNCOPE TYPE: ICD-10-CM

## 2018-02-21 DIAGNOSIS — I25.10 CORONARY ARTERY DISEASE INVOLVING NATIVE CORONARY ARTERY OF NATIVE HEART WITHOUT ANGINA PECTORIS: ICD-10-CM

## 2018-02-21 DIAGNOSIS — L30.9 DERMATITIS OF LEFT FOOT: ICD-10-CM

## 2018-02-21 DIAGNOSIS — Z01.818 PRE-OP EVALUATION: ICD-10-CM

## 2018-02-21 DIAGNOSIS — I25.5 GENERALIZED ISCHEMIC MYOCARDIAL DYSFUNCTION: ICD-10-CM

## 2018-02-21 PROCEDURE — 99205 OFFICE O/P NEW HI 60 MIN: CPT | Performed by: INTERNAL MEDICINE

## 2018-02-21 PROCEDURE — 93880 EXTRACRANIAL BILAT STUDY: CPT

## 2018-02-21 RX ORDER — TRIAMCINOLONE ACETONIDE 1 MG/G
CREAM TOPICAL
Qty: 30 G | Refills: 1 | Status: SHIPPED | OUTPATIENT
Start: 2018-02-21 | End: 2018-08-09

## 2018-02-21 ASSESSMENT — PAIN SCALES - GENERAL: PAINLEVEL: NO PAIN (0)

## 2018-02-21 NOTE — NURSING NOTE
"Chief Complaint   Patient presents with     New Patient     NEW hx of DM, HTN, CKD 3, smoking and severe PAD. Mild inducible ischemia found on recent cardiac stress MRI. Needs cardiac clearance to improve circulation for severe PAD (femoral endartectomy)       Initial /70 (BP Location: Left arm, Patient Position: Chair, Cuff Size: Adult Large)  Pulse 105  Wt 77.6 kg (171 lb 2 oz)  SpO2 100%  BMI 21.68 kg/m2 Estimated body mass index is 21.68 kg/(m^2) as calculated from the following:    Height as of 1/22/18: 1.892 m (6' 2.5\").    Weight as of this encounter: 77.6 kg (171 lb 2 oz)..  BP completed using cuff size: yvonne Trujillo MA    "

## 2018-02-21 NOTE — PATIENT INSTRUCTIONS
Thank you for coming to the [facility] @ [facility]; please note the following instructions:    You are scheduled for a carotid ultrasound today at 1:40 pm.  Please check in on the first floor 6401 building.    You are scheduled for a Coronary Angiogram on Thursday, March 1st at the [facility], [address], [place] [address]. You are to check in at the [facility] Waiting Area at 12:30 pm.     When you arrive you will be escorted back to the pre procedure area called 2A. Here they will insert an IV, draw labs, and obtain a short medical history. Please bring an updated list of your current medications.    A physician will come and talk with you about the procedure and obtain consent.    A nurse from the Cardiac Catheterization Lab will then escort you to the procedure area. You will be receiving sedation during the procedure so you will need someone to drive you to and from the hospital.    After the procedure you will recover for a short period (2 - 6 hours) on 6B. You will be discharged with instructions for post procedure care. However, depending on what the angiogram shows you may have to have stents placed and this might require an overnight stay. We ask that you bring a small bag of necessities for your comfort if you would need to stay overnight. DO NOT BRING ANY VALUABLES!      Pre procedure instructions:  1. Make arrangements to have a  as you will not be allowed to drive following the procedure. Someone should stay with you the night after your procedure.  2.  Do not eat any solid food or milk products for 8 hours prior to the exam. You may drink clear liquids until 2 hours prior to the exam. Clear liquids include the following: water, Jell-O, clear broth, apple juice or any non-carbonated drink that you can see through (no pop!).   3. Do not drink alcohol or smoke 24 hours prior to test.  4. Hold these meds:  Do not take your oral  diabetic medication or short acting insulin the day of the procedure. Do not take metformin the day of and for 2 days following, contact your PCP regarding glucose control during this time.  Hold your warfarin (2-3 days prior), pradaxa (2 days prior), Eliquis/Xarelto 1 day prior.   5. You may take your other morning medications as prescribed with a sip of water.                If you have any questions regarding your visit please contact your care team:     Cardiology  Telephone Number   Kathleen WHITING, RN  Laura CORONA, RN   Candy REBOLLEDO, SHANNEN BALBUENA, RACHEL LANE, [place]   [phone]    *After hours: [phone]   For scheduling appts:     [phone] or    [phone] (select option 1)    *After hours: [phone]     For the Device Clinic (Pacemakers and ICD's)  RN's :  Ciara [place]   During business hours: [phone]    *After business hours:  [phone] (select option 4)      Normal test result notifications will be released via [employer] or mailed within 7 business days.  All other test results, will be communicated via telephone once reviewed by your cardiologist.    If you need a medication refill please contact your pharmacy.  Please allow 3 business days for your refill to be completed.    As always, thank you for trusting us with your health care needs!

## 2018-02-21 NOTE — MR AVS SNAPSHOT
After Visit Summary   2/21/2018    Amos Walker    MRN: [id number]           Patient Information     Date Of Birth          1945        Visit Information        Provider Department      2/21/2018 10:30 AM Rubio Chinchilla MD [facility] PHYSICIANS HEART AT [facility]        Today's Diagnoses     Generalized ischemic myocardial dysfunction    -  1    PAD (peripheral artery disease) (H)        Syncope, unspecified syncope type          Care Instructions    Thank you for coming to the [facility] @ [facility]; please note the following instructions:    You are scheduled for a carotid ultrasound today at 1:40 pm.  Please check in on the first floor 6401 building.    You are scheduled for a Coronary Angiogram on Thursday, March 1st at the [facility], [address], [place] [address]. You are to check in at the [facility] Waiting Area at 12:30 pm.     When you arrive you will be escorted back to the pre procedure area called 2A. Here they will insert an IV, draw labs, and obtain a short medical history. Please bring an updated list of your current medications.    A physician will come and talk with you about the procedure and obtain consent.    A nurse from the Cardiac Catheterization Lab will then escort you to the procedure area. You will be receiving sedation during the procedure so you will need someone to drive you to and from the hospital.    After the procedure you will recover for a short period (2 - 6 hours) on 6B. You will be discharged with instructions for post procedure care. However, depending on what the angiogram shows you may have to have stents placed and this might require an overnight stay. We ask that you bring a small bag of necessities for your comfort if you would need to stay overnight. DO NOT BRING ANY VALUABLES!      Pre procedure instructions:  1. Make arrangements to have a  as you will not  be allowed to drive following the procedure. Someone should stay with you the night after your procedure.  2.  Do not eat any solid food or milk products for 8 hours prior to the exam. You may drink clear liquids until 2 hours prior to the exam. Clear liquids include the following: water, Jell-O, clear broth, apple juice or any non-carbonated drink that you can see through (no pop!).   3. Do not drink alcohol or smoke 24 hours prior to test.  4. Hold these meds:  Do not take your oral diabetic medication or short acting insulin the day of the procedure. Do not take metformin the day of and for 2 days following, contact your PCP regarding glucose control during this time.  Hold your warfarin (2-3 days prior), pradaxa (2 days prior), Eliquis/Xarelto 1 day prior.   5. You may take your other morning medications as prescribed with a sip of water.                If you have any questions regarding your visit please contact your care team:     Cardiology  Telephone Number   Kathleen WHITING, RN  Laura CORONA, RN   Candy REBOLLEDO, SHANNEN BALBUENA, RACHEL LANE [place]   [phone]    *After hours: [phone]   For scheduling appts:     [phone] or    [phone] (select option 1)    *After hours: [phone]     For the Device Clinic (Pacemakers and ICD's)  RN's :  Ciara [place]   During business hours: [phone]    *After business hours:  [phone] (select option 4)      Normal test result notifications will be released via [employer] or mailed within 7 business days.  All other test results, will be communicated via telephone once reviewed by your cardiologist.    If you need a medication refill please contact your pharmacy.  Please allow 3 business days for your refill to be completed.    As always, thank you for trusting us with your health care needs!            Follow-ups after your visit        Your next 10 appointments already scheduled     Feb 27, 2018  9:30 AM CST   Return Visit with DONNELL P[facility]  [facility] ([facility])    [address] [facility] [address]   [phone]            Mar 01, 2018  1:00 PM CST   Procedure 1.5 hr with U2A ROOM 4   Unit [address] [facility] ([facility])    [address] [address]               Mar 01, 2018  2:30 PM CST   Cath 90 Minute with [id number]   [facility]    [address] [address]   [phone]            Mar 06, 2018 10:00 AM CST   Return Visit with Brayan Mcclain DPM   [facility] ([facility])    [address] [place] [address]   [phone]            Mar 13, 2018  9:30 AM CDT   Return Visit with Brayan Mcclain DPM   [facility] ([facility])    [address] [place] [address]   [phone]            Mar 13, 2018   Procedure with Anita Stock MD   [facility], [place], Same Day Surgery (--)    [address] [facility] [address]   [phone]            Mar 20, 2018 10:00 AM CDT   Return Visit with Brayan Mcclain DPM   [facility] ([facility])    [address] [facility] [address]   [phone]            Mar 27, 2018  9:30 AM CDT   Return Visit with Brayan Mcclain DPM   [facility] ([facility])    [address] [facility] [address]   [phone]            Apr 27, 2018 10:25 AM CDT   (Arrive by 10:10 AM)   Return Visit with Racheal Swift MD   [facility] ([facility])    [address] [place] [address] [phone]            Jun 20, 2018 10:30 AM CDT   RETURN RETINA with Jen Aranda MD   [facility] ([facility])    Cardoza Wangensteen [address]  [facility] [address]   [phone]              Future tests that were ordered for you today     Open Future Orders        Priority Expected Expires Ordered    US Carotid Bilateral Routine 2/21/2018 2/21/2019 2/21/2018    Cardiac Cath: Coronary Angiography Adult Order Routine 2/28/2018 5/29/2018 2/21/2018            Who to contact     If you have questions or need follow up information about today's clinic visit or your schedule please contact [facility] PHYSICIANS HEART AT [facility] directly at [phone].  Normal or non-critical lab and imaging results will be communicated to you by [employer], letter or phone within 4 business days after the clinic has received the results. If you do not hear from us within 7 days, please contact the clinic through Thismoment or phone. If you have a critical or abnormal lab result, we will notify you by phone as soon as possible.  Submit refill requests through [employer] or call your pharmacy and they will forward the refill request to us. Please allow 3 business days for your refill to be completed.          Additional Information About Your Visit        [employer] Information     [employer] gives you secure access to your electronic health record. If you see a primary care provider, you can also send messages to your care team and make appointments. If you have questions, please call your primary care clinic.  If you do not have a primary care provider, please call [phone] and they will assist you.        Care EveryWhere ID     This is your Care EveryWhere ID. This could be used by other organizations to access your [facility] medical records  [id number]        Your Vitals Were     Pulse Pulse Oximetry BMI (Body Mass Index)             105 100% 21.68 kg/m2          Blood Pressure from Last 3 Encounters:   02/21/18 127/70   02/20/18 137/61   02/13/18 138/60    Weight from Last 3 Encounters:   02/21/18 77.6 kg (171 lb 2 oz)   01/22/18 80.2 kg (176 lb 11.2 oz)    12/29/17 80 kg (176 lb 6.4 oz)                 Today's Medication Changes          These changes are accurate as of 2/21/18 12:12 PM.  If you have any questions, ask your nurse or doctor.               These medicines have changed or have updated prescriptions.        Dose/Directions    clopidogrel 75 MG tablet   Commonly known as:  PLAVIX   This may have changed:  when to take this   Used for:  Peripheral vascular disease, unspecified        Dose:  75 mg   Take 1 tablet (75 mg) by mouth daily   Quantity:  30 tablet   Refills:  11       gentamicin 0.1 % cream   Commonly known as:  GARAMYCIN   This may have changed:    - when to take this  - reasons to take this  - additional instructions   Used for:  Ulcer of right lower leg, with fat layer exposed (H), Chronic venous hypertension with ulcer involving right side (H), Type 2 diabetes, controlled, with neuropathy (H)        Apply topically daily To right leg ulcer.   Quantity:  30 g   Refills:  5       insulin glargine 100 UNIT/ML injection   Commonly known as:  LANTUS SOLOSTAR   This may have changed:  when to take this   Used for:  Type 2 diabetes mellitus with diabetic peripheral angiopathy without gangrene, with long-term current use of insulin (H)        Dose:  25 Units   Inject 25 Units Subcutaneous At Bedtime   Quantity:  15 mL   Refills:  2            Where to get your medicines      These medications were sent to [facility] [address] [place] [address] AVE NE AT [place] [address] [facility] [address]     Phone:  [phone]     triamcinolone 0.1 % cream                Primary Care Provider Office Phone # Fax #    Racheal Swift [phone]        [address] [address]        Equal Access to Services     SAMSON MELTON : Nataliia Bedoya, miguel abebe, yolanda palma. So [facility] [phone].    ATENCIÓN:  Si habla bernard, tiene a rodrigues disposición servicios gratuitos de asistencia lingüística. Mary sparrow [phone].    We comply with applicable federal civil rights laws and [place] laws. We do not discriminate on the basis of race, color, national origin, age, disability, sex, sexual orientation, or gender identity.            Thank you!     Thank you for choosing [facility] PHYSICIANS HEART AT [facility]  for your care. Our goal is always to provide you with excellent care. Hearing back from our patients is one way we can continue to improve our services. Please take a few minutes to complete the written survey that you may receive in the mail after your visit with us. Thank you!             Your Updated Medication List - Protect others around you: Learn how to safely use, store and throw away your medicines at www.disposemymeds.org.          This list is accurate as of 2/21/18 12:12 PM.  Always use your most recent med list.                   Brand Name Dispense Instructions for use Diagnosis    ammonium lactate 12 % cream    LAC-HYDRIN    385 g    Apply topically 2 times daily as needed for dry skin    Venous stasis, Type 2 diabetes, controlled, with neuropathy (H)       ascorbic acid 500 MG Tabs     30 tablet    Take 1 tablet (500 mg) by mouth 2 times daily    Ulcer of right lower leg, with fat layer exposed (H)       ASPIRIN PO      Take 81 mg by mouth daily        blood glucose monitoring lancets     3 Box    Use to test blood sugars 2 as directed.    Type 2 diabetes, uncontrolled, with neuropathy (H)       blood glucose monitoring test strip    ONETOUCH ULTRA    60 strip    Use to test blood sugars 2 times daily or as directed.    Type 2 diabetes mellitus (H)       cephALEXin 500 MG capsule    KEFLEX    28 capsule    Take 1 capsule (500 mg) by mouth 2 times daily    Ulcer of right lower extremity with fat layer exposed (H), Skin ulcer of right foot with fat layer exposed (H), Type II or  "unspecified type diabetes mellitus with neurological manifestations, not stated as uncontrolled(250.60) (H), Diabetes mellitus with peripheral vascular disease (H)       clopidogrel 75 MG tablet    PLAVIX    30 tablet    Take 1 tablet (75 mg) by mouth daily    Peripheral vascular disease, unspecified       COLACE PO      Take 100 mg by mouth daily        ferrous sulfate 325 (65 FE) MG tablet    IRON    60 tablet    Take 1 tablet (325 mg) by mouth 2 times daily    Peripheral vascular disease, unspecified       gentamicin 0.1 % cream    GARAMYCIN    30 g    Apply topically daily To right leg ulcer.    Ulcer of right lower leg, with fat layer exposed (H), Chronic venous hypertension with ulcer involving right side (H), Type 2 diabetes, controlled, with neuropathy (H)       insulin glargine 100 UNIT/ML injection    LANTUS SOLOSTAR    15 mL    Inject 25 Units Subcutaneous At Bedtime    Type 2 diabetes mellitus with diabetic peripheral angiopathy without gangrene, with long-term current use of insulin (H)       insulin pen needle 31G X 8 MM    B-D U/F    100 each    Use 1 daily o as directed    Diabetes mellitus, type II (H)       LISINOPRIL PO      Take 20 mg by mouth 2 times daily        nateglinide 120 MG tablet    STARLIX    90 tablet    TAKE 1 TABLET BY MOUTH THREE TIMES DAILY BEFORE MEALS    Type 2 diabetes, controlled, with neuropathy (H)       OPTIFOAM 6\"X6\" Pads     1 each    1 Box once a week    Ulcer of right leg, with fat layer exposed (H)       * order for DME     2 each    Please measure and distribute 1 pair of 20mm Hg - 30mm Hg knee high ULCER CARE open or closed toe compression stockings.  Patient has a size 13 foot and please take this into consideration.  Jobst or equivalent    Varicose veins of lower extremities with other complications, Venous stasis ulcer of right lower extremity (H)       * order for DME     3 each    Please measure and distribute 1 pair of 20mmHg - 30mmHg knee high open or closed " toe compression stockings. Jobst ultrasheer or equivalent.    Varicose veins of both lower extremities with complications       * order for DME     2 each    Please measure and distribute 1 pair of 30mmHg - 40mmHg knee high open toe ulcercare compression stockings. Jobst ultrasheer or equivalent.    Varicose veins of bilateral lower extremities with other complications       sildenafil 50 MG tablet    VIAGRA    10 tablet    Take 1 tablet (50 mg) by mouth daily as needed for erectile dysfunction    Vasculogenic erectile dysfunction, unspecified vasculogenic erectile dysfunction type       silver sulfADIAZINE 1 % cream    SILVADENE    85 g    Apply topically daily To affected areas on right foot and leg.    Ulcer of right lower leg, with fat layer exposed (H), Chronic venous hypertension with ulcer involving right side (H), Type 2 diabetes, controlled, with neuropathy (H)       simvastatin 10 MG tablet    ZOCOR    90 tablet    Take 1 tablet (10 mg) by mouth At Bedtime    Type 2 diabetes, controlled, with neuropathy (H)       sitagliptin 100 MG tablet    JANUVIA    90 tablet    Take 1 tablet (100 mg) by mouth daily    Type 2 diabetes, controlled, with neuropathy (H)       triamcinolone 0.1 % cream    KENALOG    30 g    Apply sparingly to left heel daily.    Dermatitis of left foot       VITAMIN C PO      Take 1,000 mg by mouth        VITAMIN D (CHOLECALCIFEROL) PO      Take 1,000 Units by mouth 2 times daily        * Notice:  This list has 3 medication(s) that are the same as other medications prescribed for you. Read the directions carefully, and ask your doctor or other care provider to review them with you.

## 2018-02-21 NOTE — TELEPHONE ENCOUNTER
sitagliptin (RAJEEVUVIA) 100 MG tablet  Last Written Prescription Date:  8/30/17  Last Fill Quantity: 90,   # refills: 3  Last Office Visit : 12/29/17  Future Office visit:  4/27/18
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
## 2018-02-21 NOTE — NURSING NOTE
Left Heart Catheterization: Patient given Coronary Angiogram and Angioplasty: A Patient's Guide booklet. Patient was instructed regarding left heart catheterization, including discussion of the indication, procedure, preparation, intra-procedural steps, and recovery at home. Patient demonstrated understanding of this information and agreed to call with further questions or concerns.  MESSAGE SENT TO PRIMARY CARE TO ADDRESS DIABETIC MEDICATION  CAROTID ULTRASOUND BILAT  Med Reconcile: Reviewed and verified all current medications with the patient. The updated medication list was printed and given to the patient.  Return Appointment: Patient given instructions regarding scheduling next clinic visit. Patient demonstrated understanding of this information and agreed to call with further questions or concerns.  Depending on cath results  Patient stated he understood all health information given and agreed to call with further questions or concerns.  Kathleen Payne RN
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
## 2018-02-27 ENCOUNTER — TELEPHONE (OUTPATIENT)
Dept: CARDIOLOGY | Facility: CLINIC | Age: 73
End: 2018-02-27

## 2018-02-27 ENCOUNTER — OFFICE VISIT (OUTPATIENT)
Dept: PODIATRY | Facility: CLINIC | Age: 73
End: 2018-02-27
Payer: MEDICARE

## 2018-02-27 ENCOUNTER — TELEPHONE (OUTPATIENT)
Dept: INTERNAL MEDICINE | Facility: CLINIC | Age: 73
End: 2018-02-27

## 2018-02-27 VITALS — HEART RATE: 107 BPM | OXYGEN SATURATION: 98 % | SYSTOLIC BLOOD PRESSURE: 118 MMHG | DIASTOLIC BLOOD PRESSURE: 66 MMHG

## 2018-02-27 DIAGNOSIS — I73.9 PAD (PERIPHERAL ARTERY DISEASE) (H): ICD-10-CM

## 2018-02-27 DIAGNOSIS — I73.9 PVD (PERIPHERAL VASCULAR DISEASE) (H): Primary | ICD-10-CM

## 2018-02-27 DIAGNOSIS — E11.49 TYPE II OR UNSPECIFIED TYPE DIABETES MELLITUS WITH NEUROLOGICAL MANIFESTATIONS, NOT STATED AS UNCONTROLLED(250.60) (H): ICD-10-CM

## 2018-02-27 DIAGNOSIS — E11.51 DIABETES MELLITUS WITH PERIPHERAL VASCULAR DISEASE (H): ICD-10-CM

## 2018-02-27 DIAGNOSIS — Z79.01 LONG-TERM (CURRENT) USE OF ANTICOAGULANTS: ICD-10-CM

## 2018-02-27 DIAGNOSIS — L97.512 SKIN ULCER OF RIGHT FOOT WITH FAT LAYER EXPOSED (H): ICD-10-CM

## 2018-02-27 DIAGNOSIS — L97.912 ULCER OF RIGHT LOWER EXTREMITY WITH FAT LAYER EXPOSED (H): Primary | ICD-10-CM

## 2018-02-27 LAB
ANION GAP SERPL CALCULATED.3IONS-SCNC: 8 MMOL/L (ref 3–14)
BASOPHILS # BLD AUTO: 0.1 10E9/L (ref 0–0.2)
BASOPHILS NFR BLD AUTO: 0.8 %
BUN SERPL-MCNC: 28 MG/DL (ref 7–30)
CALCIUM SERPL-MCNC: 8.3 MG/DL (ref 8.5–10.1)
CHLORIDE SERPL-SCNC: 101 MMOL/L (ref 94–109)
CO2 SERPL-SCNC: 27 MMOL/L (ref 20–32)
CREAT SERPL-MCNC: 1.2 MG/DL (ref 0.66–1.25)
DIFFERENTIAL METHOD BLD: ABNORMAL
EOSINOPHIL # BLD AUTO: 0.1 10E9/L (ref 0–0.7)
EOSINOPHIL NFR BLD AUTO: 0.9 %
ERYTHROCYTE [DISTWIDTH] IN BLOOD BY AUTOMATED COUNT: 13.2 % (ref 10–15)
GFR SERPL CREATININE-BSD FRML MDRD: 60 ML/MIN/1.7M2
GLUCOSE SERPL-MCNC: 160 MG/DL (ref 70–99)
HCT VFR BLD AUTO: 27.5 % (ref 40–53)
HGB BLD-MCNC: 8.6 G/DL (ref 13.3–17.7)
IMM GRANULOCYTES # BLD: 0.1 10E9/L (ref 0–0.4)
IMM GRANULOCYTES NFR BLD: 1.3 %
INR PPP: 1.05 (ref 0.86–1.14)
LYMPHOCYTES # BLD AUTO: 0.8 10E9/L (ref 0.8–5.3)
LYMPHOCYTES NFR BLD AUTO: 9.8 %
MCH RBC QN AUTO: 29.4 PG (ref 26.5–33)
MCHC RBC AUTO-ENTMCNC: 31.3 G/DL (ref 31.5–36.5)
MCV RBC AUTO: 94 FL (ref 78–100)
MONOCYTES # BLD AUTO: 0.5 10E9/L (ref 0–1.3)
MONOCYTES NFR BLD AUTO: 6 %
NEUTROPHILS # BLD AUTO: 6.5 10E9/L (ref 1.6–8.3)
NEUTROPHILS NFR BLD AUTO: 81.2 %
PLATELET # BLD AUTO: 279 10E9/L (ref 150–450)
POTASSIUM SERPL-SCNC: 4 MMOL/L (ref 3.4–5.3)
RBC # BLD AUTO: 2.93 10E12/L (ref 4.4–5.9)
SODIUM SERPL-SCNC: 136 MMOL/L (ref 133–144)
WBC # BLD AUTO: 7.9 10E9/L (ref 4–11)

## 2018-02-27 PROCEDURE — 85610 PROTHROMBIN TIME: CPT | Performed by: INTERNAL MEDICINE

## 2018-02-27 PROCEDURE — 36415 COLL VENOUS BLD VENIPUNCTURE: CPT | Performed by: INTERNAL MEDICINE

## 2018-02-27 PROCEDURE — 80048 BASIC METABOLIC PNL TOTAL CA: CPT | Performed by: INTERNAL MEDICINE

## 2018-02-27 PROCEDURE — 99213 OFFICE O/P EST LOW 20 MIN: CPT | Performed by: PODIATRIST

## 2018-02-27 PROCEDURE — 85025 COMPLETE CBC W/AUTO DIFF WBC: CPT | Performed by: INTERNAL MEDICINE

## 2018-02-27 ASSESSMENT — PAIN SCALES - GENERAL: PAINLEVEL: NO PAIN (0)

## 2018-02-27 NOTE — TELEPHONE ENCOUNTER
Hello,  I am receiving this message after being out of the office. I would recommend Amos hold his oral diabetic medications on day of angiogram (januvia and starlix) and take only 10 units of lantus either PM or AM before (whenever his usual dose is).  Regarding new anemia, he should be seen in our clinic ASAP for further evaluation or sooner if symptoms.  Please advise if you can notify him of these recommendations.  Thanks,  Racheal Swift MD  Internal Medicine

## 2018-02-27 NOTE — PROGRESS NOTES
[facility] DISCHARGE SUMMARY    PATIENT'S NAME: Amos Walker  YOB: 1945  MEDICAL RECORD NUMBER:  [id number]    PRIMARY CARE PROVIDER AND CLINIC RESPONSIBLE AFTER TRANSFER: LcuioroloRacheal danielsonWillard [address] [facility] [address] / [facility] [address]     CODE STATUS/ADVANCE DIRECTIVES DISCUSSION:   CPR/Full code     Allergies   Allergen Reactions     Lisinopril Other (See Comments)     dizziness     Neomycin Other (See Comments)     Wound gets worse     Methylchloroisothiazolinone [Methylisothiazolinone] Rash     Povidone Iodine Rash     TRANSFERRING PROVIDERS: LEW Montague CNP, Maite Tariq MD  DATE OF SNF ADMISSION:  August / 30 / 2017  DATE OF SNF (anticipated) DISCHARGE: September / 15 / 2017  DISCHARGE DISPOSITION: G Provider   Nursing Facility: [facility] stay 8/24/17 to 8/30/17.     Condition on Discharge:  Improving.  Function: As of 9/7/17: Bed mobility, toileting and sit-to-stand transfers with supervision. Ambulating 200 feet with FWW and supervision. Able to complete 12 steps with 1 rail and SBA.   Cognitive Scores: BIMS 14/15    Equipment: walker    DISCHARGE DIAGNOSIS:   1. Aftercare following left hip joint replacement surgery    2. Anemia due to blood loss, acute    3. CKD (chronic kidney disease) stage 3, GFR 30-59 ml/min    4. PVD (peripheral vascular disease) (H)    5. Type 2 diabetes, controlled, with neuropathy (H)    6. Essential hypertension    7. Hyperlipidemia, unspecified hyperlipidemia type    8. Tobacco abuse    9. Chronic foot ulcer, right, with unspecified severity (H)    10. Slow transit constipation      HPI Nursing Facility Course:  HPI information obtained from: facility chart records, facility staff, patient report and [facility] chart review.    This is a 72-year-old male, with a past medical history significant for peripheral artery disease s/p right leg stent,  Per Dr. Melita Hess make sure patient has started her Z-mitzi, which she has - rx for Prednisone 20mg # 7 tabs sent to pharmacy. Patient informed of new medication to her pharmacy. Has appt 3/2/18. chronic MRSA lower extremity ulcers, diabetes mellitus type 2, peripheral neuropathy, chronic kidney disease stage III, tobacco dependence, hypertension and hyperlipidemia, who was admitted to the [facility] with left hip and elbow pain after a mechanical fall. A pelvic x-ray revealed a left femoral neck fracture. Trauma and Ortho were consulted. A left total hip arthroplasty was performed on 8/27/17 by Dr. Jackman. Hemoglobin 11.5 8/24/17 -> 9.3 on 8/30/17. A TCU stay was recommended for ongoing physical rehabilitation.     Left Femoral Neck Fracture S/P Left Total Hip Arthroplasty 8/27/17 by Dr. Jackman.  Follow-up with Dr. Jackman on 9/13/17 as scheduled. Acetaminophen, Oxycodone and Methocarbamol for pain control. Calcium and Vitamin D ordered. Home Physical and Occupational Therapy ordered for deconditioning. Warfarin initiated for DVT prophylaxis x 4-6 weeks post-operatively with orders as follows:    INR 3.0 9/11/17 Warfarin 3 mg on 9/11/17 and 9/13/17, Warfarin 4 mg on 9/12/17  INR 1.7 9/7/17 Warfarin 4 mg daily  INR 1.9 9/5/17 Warfarin 3 mg daily     Anemia due to Blood Loss, Acute. Secondary to above. Hemoglobin 11.5 8/24/17 -> 9.3 on 8/30/17. Repeat Hemoglobin 8.5 on 9/5/17. Will repeat CBC on 9/14/17 to ensure stability. Also on Ferrous Sulfate.      Chronic Kidney Disease, Stage III. Baseline Creatinine ~ 1.1-1.3. Last Creatinine 1.10 on 9/5/17. Monitor periodically to ensure stability.      Peripheral Vascular Disease S/P Right Leg Stent. Continue Clopidogrel as ordered. Aspirin discontinued while on Warfarin.      Type 2 Diabetes Mellitus. Last A1C 6.7 on 6/16/17. Continue Glargine, Nateglinide and Sitagliptin as ordered. Glargine increased from 25 U to 28 U during TCU stay. Insulin Aspart Sliding Scale discontinued prior to TCU discharge. Blood sugar range over the past 5 days is as follows:    Breakfast:   Lunch: 200-294  Dinner:   Bedtime:  108-251     Hypertension/Hyperlipidemia. Upon review of blood pressures over the past 5 days, systolic range from 102-154. Diastolic range from 62-82. Most blood pressures <140/90. On no antihypertensive medications. Continue Simvastatin as ordered.     Tobacco Abuse.  Has not been smoking cigarettes since hospital admission 8/24/17. Denies need for tobacco replacement. Continue to encourage tobacco cessation.     Chronic Right Lower Extremity Ulceration/Left Heel Blister. Needs to make follow-up outpatient appointment with Dr. Mcclain. Continue dressing changes as ordered with Gentamycin and topical Silvadene. Developed blister on left heel during TCU stay. Covered with Tegaderm. Encourage heel elevation.     Constipation. Continue Miralax PRN and Senna-S PRN as ordered. Changed from scheduled to PRN due to loose stools.    Left Lower Extremity Edema. Noted by staff 9/7/17. Compression stockings ordered. Not present on 9/11/17 exam.     PAST MEDICAL HISTORY:  has a past medical history of CKD (chronic kidney disease) stage 3, GFR 30-59 ml/min; HTN (hypertension); Hyperlipidemia; MRSA cellulitis of right foot; PAD (peripheral artery disease) (H); Tobacco use; Type 2 diabetes mellitus (H); and Venous ulcer.    DISCHARGE MEDICATIONS:  Current Outpatient Prescriptions   Medication Sig Dispense Refill     insulin glargine (LANTUS) 100 UNIT/ML injection Inject 28 Units Subcutaneous every morning       oxyCODONE (ROXICODONE) 5 MG IR tablet Take 1-2 tablets (5-10 mg) by mouth every 3 hours as needed for moderate to severe pain And scheduled Oxycodone 10 mg at 0800 and 1300 prior to therapies  0     polyethylene glycol (MIRALAX/GLYCOLAX) Packet Take 17 g by mouth daily as needed for constipation 7 packet      senna-docusate (SENOKOT-S;PERICOLACE) 8.6-50 MG per tablet Take 2 tablets by mouth 2 times daily as needed for constipation 100 tablet      acetaminophen (TYLENOL) 500 MG tablet Take 2 tablets (1,000 mg) by mouth 3  times daily       WARFARIN SODIUM PO Take by mouth daily See facility INR for dosing       insulin aspart (NOVOLOG PEN) 100 UNIT/ML injection Inject 1-7 Units Subcutaneous 3 times daily (before meals) Do Not give Correction Insulin if Pre-Meal BG less than 140.    - 189 give 1 unit.    - 239 give 2 units.    - 289 give 3 units.    - 339 give 4 units.   - 399 give 5 units.   -449 give 6 units  BG greater than or equal to 450 give 7 units.       insulin aspart (NOVOLOG PEN) 100 UNIT/ML injection Inject 1-5 Units Subcutaneous At Bedtime Do Not give Bedtime Correction Insulin if BG less than  200.   For  - 249 give 1 units.   For  - 299 give 2 units.   For  - 349 give 3 units.   For  -399 give 4 units.   For BG greater than or equal to 400 give 5 units.  Notify provider if glucose greater than or equal to 350 mg/dL after administration of correction dose.       nateglinide (STARLIX) 120 MG tablet TAKE 1 TABLET BY MOUTH THREE TIMES DAILY BEFORE MEALS 90 tablet 11     sitagliptin (JANUVIA) 100 MG tablet Take 1 tablet (100 mg) by mouth daily 90 tablet 3     simvastatin (ZOCOR) 10 MG tablet Take 1 tablet (10 mg) by mouth At Bedtime 90 tablet 3     ammonium lactate (LAC-HYDRIN) 12 % cream Apply topically 2 times daily as needed for dry skin 385 g 3     gentamicin (GARAMYCIN) 0.1 % cream Apply topically daily To right leg ulcer. 30 g 5     hydrocortisone (WESTCORT) 0.2 % cream Apply sparingly to affected area twice times daily for 14 days. 15 g 3     silver sulfADIAZINE (SILVADENE) 1 % cream Apply topically daily To affected areas on right foot and leg. 85 g 5     ferrous sulfate (IRON) 325 (65 FE) MG tablet Take 1 tablet (325 mg) by mouth 2 times daily 60 tablet 11     calcium carbonate (OS-CLAUDIA 500 MG [place]. [place]) 1250 MG tablet Take 1 tablet (1,250 mg) by mouth 2 times daily (with meals) 90 tablet      clopidogrel (PLAVIX) 75 MG tablet Take 1 tablet (75 mg) by mouth  "daily 30 tablet 11     methocarbamol (ROBAXIN) 750 MG tablet Take 1 tablet (750 mg) by mouth 4 times daily 45 tablet      ascorbic acid 500 MG TABS Take 1 tablet (500 mg) by mouth 2 times daily 30 tablet      cholecalciferol 3000 UNITS TABS Take 3,000 Units by mouth daily 30 tablet      insulin pen needle (B-D U/F) 31G X 8 MM Use 1 daily o as directed 100 each 3     order for DME Please measure and distribute 1 pair of 30mmHg - 40mmHg knee high open toe ulcercare compression stockings. Jobst ultrasheer or equivalent. 2 each 6     blood glucose monitoring (ONE TOUCH ULTRA) test strip Use to test blood sugars 2 times daily or as directed. 60 strip 11     sildenafil (VIAGRA) 50 MG tablet Take 1 tablet (50 mg) by mouth daily as needed for erectile dysfunction 10 tablet 11     blood glucose monitoring (FREESTYLE) lancets Use to test blood sugars 2 as directed. 3 Box 3     order for DME Please measure and distribute 1 pair of 20mmHg - 30mmHg knee high open or closed toe compression stockings. Jobst ultrasheer or equivalent. 3 each 12     order for DME Please measure and distribute 1 pair of 20mm Hg - 30mm Hg knee high ULCER CARE open or closed toe compression stockings.   Patient has a size 13 foot and please take this into consideration.   Jobst or equivalent 2 each 1     Gauze Pads & Dressings (OPTIFOAM) 6\"X6\" PADS 1 Box once a week 1 each 6     MEDICATION CHANGES/RATIONALE: See above  Controlled medications sent with patient: Script for Oxycodone medication for 45 tabs and 0 refills given to patient at dischage to have them fill at their out patient pharmacy     ROS:    4 point ROS including Respiratory, CV, GI and , other than that noted in the HPI,  is negative    Physical Exam:   Vitals: /65  Pulse 82  Temp 98.2  F (36.8  C)  Resp 18  Wt 171 lb 6.4 oz (77.7 kg)  SpO2 97%  BMI 22.01 kg/m2  BMI= Body mass index is 22.01 kg/(m^2).  GENERAL APPEARANCE:  Alert, in no distress  ENT:  Mouth and posterior " oropharynx normal, moist mucous membranes  EYES:  EOM, conjunctivae, lids, pupils and irises normal  RESP:  respiratory effort and palpation of chest normal, lungs clear to auscultation , no respiratory distress  CV:  Palpation and auscultation of heart done , regular rate and rhythm, no murmur, rub, or gallop  ABDOMEN:  normal bowel sounds, soft, nontender, no hepatosplenomegaly or other masses  M/S:   Active movement of bilateral upper and lower extremities. No edema.  SKIN:  Inspection of skin and subcutaneous tissue baseline, Palpation of skin and subcutaneous tissue baseline. Kerlix on right shin. Tegaderm on left heel.  NEURO:   Cranial nerves 2-12 are normal tested and grossly at patient's baseline  PSYCH:  affect and mood normal    DISCHARGE PLAN:  Occupational Therapy, Physical Therapy, Registered Nurse, Home Health Aide and From:  [facility]  Patient instructed to follow-up with:  PCP in 7 days      Current [place] scheduled appointments:  Future Appointments  Date Time Provider Department Center   9/11/2017 11:30 AM Afsaneh Suggs, LEW CNP [facility]   9/13/2017 5:15 PM Ish Jackman MD [facility]   9/18/2017 12:40 PM Velia Maier MD [facility]   10/19/2017 11:25 AM Racheal Swift MD [facility]   6/20/2018 10:30 AM Jen Aranda MD [facility]     MTM referral needed and placed by this provider: No    Pending labs: INR and CBC 9/14/17    [facility] labs   Last Complete Blood Count:  Lab Results   Component Value Date    WBC 7.1 09/05/2017     Lab Results   Component Value Date    RBC 2.68 09/05/2017     Lab Results   Component Value Date    HGB 8.5 09/05/2017     Lab Results   Component Value Date    HCT 26.3 09/05/2017     Lab Results   Component Value Date    MCV 98 09/05/2017     Lab Results   Component Value Date    MCH 31.7 09/05/2017     Lab Results   Component Value Date    MCHC 32.3 09/05/2017     Lab Results    Component Value Date    RDW 12.5 09/05/2017     Lab Results   Component Value Date     09/05/2017     Last Basic Metabolic Panel:  Lab Results   Component Value Date     09/05/2017      Lab Results   Component Value Date    POTASSIUM 4.3 09/05/2017     Lab Results   Component Value Date    CHLORIDE 105 09/05/2017     Lab Results   Component Value Date    CLAUDIA 8.5 09/05/2017     Lab Results   Component Value Date    CO2 24 09/05/2017     Lab Results   Component Value Date    BUN 23 09/05/2017     Lab Results   Component Value Date    CR 1.10 09/05/2017     Lab Results   Component Value Date     09/05/2017       Discharge Treatments: See facility discharge orders    TOTAL DISCHARGE TIME:   Greater than 30 minutes     Electronically signed by:  LEW Montague CNP        Documentation of Face-to-Face and Certification for Home Health Services     Patient: Amos Walker   YOB: 1945  MR Number: [id number]  Today's Date: 9/11/2017    I certify that patient: Amos Walker is under my care and that I, or a nurse practitioner or physician's assistant working with me, had a face-to-face encounter that meets the physician face-to-face encounter requirements with this patient on: 9/1/17    This encounter with the patient was in whole, or in part, for the following medical condition, which is the primary reason for home health care: Left Femoral Neck Fracture S/P Left Total Hip Arthroplasty 8/27/17 by Dr. Jackman.    I certify that, based on my findings, the following services are medically necessary home health services: Nursing, Occupational Therapy and Physical Therapy.    My clinical findings support the need for the above services because: Nurse is needed: To assess INR after changes in medications or other medical regimen.., Occupational Therapy Services are needed to assess and treat cognitive ability and address ADL safety due to impairment in mobility due to Left Femoral  Neck Fracture S/P Left Total Hip Arthroplasty 8/27/17 by Dr. Jackman.. and Physical Therapy Services are needed to assess and treat the following functional impairments: Gait instability due to Left Femoral Neck Fracture S/P Left Total Hip Arthroplasty 8/27/17 by Dr. Jackman..    Further, I certify that my clinical findings support that this patient is homebound (i.e. absences from home require considerable and taxing effort and are for medical reasons or [employer] services or infrequently or of short duration when for other reasons) because: Requires assistance of another person or specialized equipment to access medical services because patient: Requires supervision of another for safe transfer...    Based on the above findings. I certify that this patient is confined to the home and needs intermittent skilled nursing care, physical therapy and/or speech therapy.  The patient is under my care, and I have initiated the establishment of the plan of care.  This patient will be followed by a physician who will periodically review the plan of care.  Physician/Provider to provide follow up care: Racheal Swift    Responsible Medicare certified [place] Physician: Dr. Maite Tariq  Physician Signature: See electronic signature associated with these discharge orders.  Date: 9/11/2017
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
## 2018-02-27 NOTE — MR AVS SNAPSHOT
After Visit Summary   2018    Amos Walker    MRN: [id number]           Patient Information     Date Of Birth          1945        Visit Information        Provider Department      2018 9:30 AM Brayan Mcclain DPM [facility]        Today's Diagnoses     Ulcer of right lower extremity with fat layer exposed (H)    -  1    Skin ulcer of right foot with fat layer exposed (H)        Type II or unspecified type diabetes mellitus with neurological manifestations, not stated as uncontrolled(250.60) (H)        Diabetes mellitus with peripheral vascular disease (H)          Care Instructions    Thanks for coming today.  [facility]  [address] [place] [address]    To schedule future appointments in Ortho Clinic, you may call [phone].    To schedule ordered imaging by your provider:   Call Central Imaging [phone]    To schedule an injection ordered by your provider:  Call Central Imaging Injection scheduling line: [phone]  Seeder available online at:  [email or web address]    Please call if any further questions or concerns ([phone]).  Clinic hours 8 am to 5 pm.    Return to clinic (call) if symptoms worsen or fail to improve.            Follow-ups after your visit        Your next 10 appointments already scheduled     Mar 01, 2018 12:30 PM CST   LAB with [facility] LAB GOLD WAITING   [facility] ([facility], [facility])    [address] [facility] [address]              Please do not eat 10-12 hours before your appointment if you are coming in fasting for labs on lipids, cholesterol, or glucose (sugar). This does not apply to pregnant women. Water, hot tea and black coffee (with nothing added) are okay. Do not drink other fluids, diet soda or chew gum.            Mar 01, 2018  1:00 PM CST   Procedure 1.5 hr with U2A ROOM 4   Unit 2A [facility] [place] ([facility]  [facility])    [address] [facility] [address]               Mar 01, 2018  2:30 PM CST   Cath 90 Minute with UUHCVR5   [facility],  Heart Cath Lab ([facility])    [address] [address]   [phone]            Mar 06, 2018 10:00 AM CST   Return Visit with Brayan Mcclain DPM   [facility]    [address] [facility] [address]   [phone]            Mar 13, 2018  9:30 AM CDT   Return Visit with Brayan Mcclain DPM   [facility] ([facility])    [address] [facility] [address]   [phone]            Mar 13, 2018   Procedure with Anita Stock MD   [facility] [place], Same Day Surgery (--)    [address] [facility] [address]   [phone]            Mar 20, 2018 10:00 AM CDT   Return Visit with Brayan Mcclain DPM   [facility] ([facility])    [address] [facility] [address]   [phone]            Mar 27, 2018  9:30 AM CDT   Return Visit with Brayan Mcclain DPM   [facility] ([facility])    [address] [facility] [address]   [phone]            Apr 27, 2018 10:25 AM CDT   (Arrive by 10:10 AM)   Return Visit with Racheal Swift MD   [facility] ([facility])    [address] [facility]  [address] [facility] [id number] [phone]            Jun 20, 2018 10:30 AM CDT   RETURN RETINA with Jen Aranda MD   [facility] ([facility])    [address]  [address] [address]  [facility] [address] [phone]              Future tests that were ordered for you today     Open Future Orders        Priority Expected Expires Ordered    CBC with platelets Routine  2/27/2019 2/27/2018    INR Routine   2/27/2019 2/27/2018            Who to contact     If you have questions or need follow up information about today's clinic visit or your schedule please contact [facility] directly at [phone].  Normal or non-critical lab and imaging results will be communicated to you by CapLinkedhart, letter or phone within 4 business days after the clinic has received the results. If you do not hear from us within 7 days, please contact the clinic through CapLinkedhart or phone. If you have a critical or abnormal lab result, we will notify you by phone as soon as possible.  Submit refill requests through Zindigo or call your pharmacy and they will forward the refill request to us. Please allow 3 business days for your refill to be completed.          Additional Information About Your Visit        Zindigo Information     Zindigo gives you secure access to your electronic health record. If you see a primary care provider, you can also send messages to your care team and make appointments. If you have questions, please call your primary care clinic.  If you do not have a primary care provider, please call [phone] and they will assist you.      Zindigo is an electronic gateway that provides easy, online access to your medical records. With Zindigo, you can request a clinic appointment, read your test results, renew a prescription or communicate with your care team.     To access your existing account, please contact your [facility] or call [phone] for assistance.        Care EveryWhere ID     This is your Care EveryWhere ID. This could be used by other organizations to access your [facility] medical records  [id number]        Your Vitals Were     Pulse Pulse Oximetry                107 98%           Blood Pressure from Last 3 Encounters:   02/27/18 118/66   02/21/18 127/70   02/20/18 137/61    Weight from Last 3 Encounters:   02/21/18 77.6 kg (171 lb 2 oz)   01/22/18 80.2 kg (176 lb  11.2 oz)   12/29/17 80 kg (176 lb 6.4 oz)              Today, you had the following     No orders found for display         Today's Medication Changes          These changes are accurate as of 2/27/18 12:02 PM.  If you have any questions, ask your nurse or doctor.               These medicines have changed or have updated prescriptions.        Dose/Directions    clopidogrel 75 MG tablet   Commonly known as:  PLAVIX   This may have changed:  when to take this   Used for:  Peripheral vascular disease, unspecified        Dose:  75 mg   Take 1 tablet (75 mg) by mouth daily   Quantity:  30 tablet   Refills:  11       gentamicin 0.1 % cream   Commonly known as:  GARAMYCIN   This may have changed:    - when to take this  - reasons to take this  - additional instructions   Used for:  Ulcer of right lower leg, with fat layer exposed (H), Chronic venous hypertension with ulcer involving right side (H), Type 2 diabetes, controlled, with neuropathy (H)        Apply topically daily To right leg ulcer.   Quantity:  30 g   Refills:  5       insulin glargine 100 UNIT/ML injection   Commonly known as:  LANTUS SOLOSTAR   This may have changed:  when to take this   Used for:  Type 2 diabetes mellitus with diabetic peripheral angiopathy without gangrene, with long-term current use of insulin (H)        Dose:  25 Units   Inject 25 Units Subcutaneous At Bedtime   Quantity:  15 mL   Refills:  2                Primary Care Provider Office Phone # Fax #    Racheal Swift [phone]       [address] [address]        Equal Access to Services     SAMSON MELTON AH: Hadii niurka Bedoya, waaxda luqadaha, qaybta kaalmada adekirkda, yolanda lopez. So [facility] [phone].    ATENCIÓN: Si habla español, tiene a rodrigues disposición servicios gratuitos de asistencia lingüística. Llame al [phone].    We comply with applicable federal civil rights laws and [place] laws.  We do not discriminate on the basis of race, color, national origin, age, disability, sex, sexual orientation, or gender identity.            Thank you!     Thank you for choosing [facility]  for your care. Our goal is always to provide you with excellent care. Hearing back from our patients is one way we can continue to improve our services. Please take a few minutes to complete the written survey that you may receive in the mail after your visit with us. Thank you!             Your Updated Medication List - Protect others around you: Learn how to safely use, store and throw away your medicines at www.disposemymeds.org.          This list is accurate as of 2/27/18 12:02 PM.  Always use your most recent med list.                   Brand Name Dispense Instructions for use Diagnosis    ammonium lactate 12 % cream    LAC-HYDRIN    385 g    Apply topically 2 times daily as needed for dry skin    Venous stasis, Type 2 diabetes, controlled, with neuropathy (H)       ascorbic acid 500 MG Tabs     30 tablet    Take 1 tablet (500 mg) by mouth 2 times daily    Ulcer of right lower leg, with fat layer exposed (H)       ASPIRIN PO      Take 81 mg by mouth daily        blood glucose monitoring lancets     3 Box    Use to test blood sugars 2 as directed.    Type 2 diabetes, uncontrolled, with neuropathy (H)       blood glucose monitoring test strip    ONETOUCH ULTRA    60 strip    Use to test blood sugars 2 times daily or as directed.    Type 2 diabetes mellitus (H)       cephALEXin 500 MG capsule    KEFLEX    28 capsule    Take 1 capsule (500 mg) by mouth 2 times daily    Ulcer of right lower extremity with fat layer exposed (H), Skin ulcer of right foot with fat layer exposed (H), Type II or unspecified type diabetes mellitus with neurological manifestations, not stated as uncontrolled(250.60) (H), Diabetes mellitus with peripheral vascular disease (H)       clopidogrel 75 MG tablet    PLAVIX    30 tablet     "Take 1 tablet (75 mg) by mouth daily    Peripheral vascular disease, unspecified       COLACE PO      Take 100 mg by mouth daily        ferrous sulfate 325 (65 FE) MG tablet    IRON    60 tablet    Take 1 tablet (325 mg) by mouth 2 times daily    Peripheral vascular disease, unspecified       gentamicin 0.1 % cream    GARAMYCIN    30 g    Apply topically daily To right leg ulcer.    Ulcer of right lower leg, with fat layer exposed (H), Chronic venous hypertension with ulcer involving right side (H), Type 2 diabetes, controlled, with neuropathy (H)       insulin glargine 100 UNIT/ML injection    LANTUS SOLOSTAR    15 mL    Inject 25 Units Subcutaneous At Bedtime    Type 2 diabetes mellitus with diabetic peripheral angiopathy without gangrene, with long-term current use of insulin (H)       insulin pen needle 31G X 8 MM    B-D U/F    100 each    Use 1 daily o as directed    Diabetes mellitus, type II (H)       LISINOPRIL PO      Take 20 mg by mouth 2 times daily        nateglinide 120 MG tablet    STARLIX    90 tablet    TAKE 1 TABLET BY MOUTH THREE TIMES DAILY BEFORE MEALS    Type 2 diabetes, controlled, with neuropathy (H)       OPTIFOAM 6\"X6\" Pads     1 each    1 Box once a week    Ulcer of right leg, with fat layer exposed (H)       * order for DME     2 each    Please measure and distribute 1 pair of 20mm Hg - 30mm Hg knee high ULCER CARE open or closed toe compression stockings.  Patient has a size 13 foot and please take this into consideration.  Jobst or equivalent    Varicose veins of lower extremities with other complications, Venous stasis ulcer of right lower extremity (H)       * order for DME     3 each    Please measure and distribute 1 pair of 20mmHg - 30mmHg knee high open or closed toe compression stockings. Jobst ultrasheer or equivalent.    Varicose veins of both lower extremities with complications       * order for DME     2 each    Please measure and distribute 1 pair of 30mmHg - 40mmHg knee " high open toe ulcercare compression stockings. Jobst ultrasheer or equivalent.    Varicose veins of bilateral lower extremities with other complications       sildenafil 50 MG tablet    VIAGRA    10 tablet    Take 1 tablet (50 mg) by mouth daily as needed for erectile dysfunction    Vasculogenic erectile dysfunction, unspecified vasculogenic erectile dysfunction type       silver sulfADIAZINE 1 % cream    SILVADENE    85 g    Apply topically daily To affected areas on right foot and leg.    Ulcer of right lower leg, with fat layer exposed (H), Chronic venous hypertension with ulcer involving right side (H), Type 2 diabetes, controlled, with neuropathy (H)       simvastatin 10 MG tablet    ZOCOR    90 tablet    Take 1 tablet (10 mg) by mouth At Bedtime    Type 2 diabetes, controlled, with neuropathy (H)       * sitagliptin 100 MG tablet    JANUVIA    90 tablet    Take 1 tablet (100 mg) by mouth daily    Type 2 diabetes, controlled, with neuropathy (H)       * sitagliptin 100 MG tablet    JANUVIA    90 tablet    Take 1 tablet (100 mg) by mouth daily    Type 2 diabetes, HbA1c goal < 7% (H)       triamcinolone 0.1 % cream    KENALOG    30 g    Apply sparingly to left heel daily.    Dermatitis of left foot       VITAMIN C PO      Take 1,000 mg by mouth        VITAMIN D (CHOLECALCIFEROL) PO      Take 1,000 Units by mouth 2 times daily        * Notice:  This list has 5 medication(s) that are the same as other medications prescribed for you. Read the directions carefully, and ask your doctor or other care provider to review them with you.

## 2018-02-27 NOTE — TELEPHONE ENCOUNTER
Result note reviewed with patient.  Patient denies blood in urine or black tarry stools.  States this has been an ongoing situation.  Patient is scheduled for an angiogram 3/1.  Will message primary care doctor.  Dr. Chinchilla informed and will contact Dr. Moore (cardiology interventionalist) regarding patient's hemoglobin.  Orders placed for hemoglobin and INR to be rechecked prior to the angiogram (day of angiogram).  Patient verbalized understanding.    Kathleen Payne RN  Care Coordinator  [facility] [place] [facility]  [phone]            Notes Recorded by Rubio Chinchilla MD on 2/27/2018 at 10:33 AM  Anemia, lower Hb than previous one. He had low Hb 5 months ago as well.      Component      Latest Ref Rng & Units 2/27/2018   WBC      4.0 - 11.0 10e9/L 7.9   RBC Count      4.4 - 5.9 10e12/L 2.93 (L)   Hemoglobin      13.3 - 17.7 g/dL 8.6 (L)   Hematocrit      40.0 - 53.0 % 27.5 (L)   MCV      78 - 100 fl 94   MCH      26.5 - 33.0 pg 29.4   MCHC      31.5 - 36.5 g/dL 31.3 (L)   RDW      10.0 - 15.0 % 13.2   Platelet Count      150 - 450 10e9/L 279   Diff Method       Automated Method   % Neutrophils      % 81.2   % Lymphocytes      % 9.8   % Monocytes      % 6.0   % Eosinophils      % 0.9   % Basophils      % 0.8   % Immature Granulocytes      % 1.3   Absolute Neutrophil      1.6 - 8.3 10e9/L 6.5   Absolute Lymphocytes      0.8 - 5.3 10e9/L 0.8   Absolute Monocytes      0.0 - 1.3 10e9/L 0.5   Absolute Eosinophils      0.0 - 0.7 10e9/L 0.1   Absolute Basophils      0.0 - 0.2 10e9/L 0.1   Abs Immature Granulocytes      0 - 0.4 10e9/L 0.1

## 2018-02-27 NOTE — TELEPHONE ENCOUNTER
----- Message from Kathleen Payne RN sent at 2/21/2018 12:40 PM CST -----  [facility] pt, Amos, is scheduled for a coronary angiogram 3/1. He is to check in at 1:00 pm.  Fasting 8 hours prior but can drink clear liquids up to 2 hours prior.  He is diabetic and is concerned-I told him I would message you to advise on his diabetic medications and then will communicate with him on what to do.      Please advise.    Kathleen Payne RN  Care Coordinator  [facility] [place] [facility]  [phone]
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
## 2018-02-28 NOTE — TELEPHONE ENCOUNTER
Dr. Chinchilla informed Dr. Moore regarding low hemoglobin and Dr. Moore has confirmed that angiogram does not need to be cancelled.  Hemoglobin will be rechecked day of angiogram.  Please see 2/28 telephone encounter with primary care advise on diabetic medications prior to angiogram and follow up clinic appt to be made re: Hg levels.    Kathleen Payne, RN  Care Coordinator  [facility] [place] [facility]  [phone]

## 2018-02-28 NOTE — TELEPHONE ENCOUNTER
Spoke to patient and relayed MD's response regarding diabetic medications instructions prior to the angiogram.  Patient verbalized understanding.    Also, advised him to make an appointment with his primary care provider to discuss low hemoglobin.  Patient verbalized understanding and states he will call to schedule.    Kathleen Payne, SAFIA  Care Coordinator  [facility] [place] [facility]  [phone]

## 2018-02-28 NOTE — PROGRESS NOTES
Reminder message sent to pt via [employer] re: cor angio scheduled for tomorrow. Included instructions and callback number should pt have questions.

## 2018-03-01 ENCOUNTER — APPOINTMENT (OUTPATIENT)
Dept: MEDSURG UNIT | Facility: CLINIC | Age: 73
End: 2018-03-01
Attending: INTERNAL MEDICINE
Payer: MEDICARE

## 2018-03-01 ENCOUNTER — APPOINTMENT (OUTPATIENT)
Dept: CARDIOLOGY | Facility: CLINIC | Age: 73
End: 2018-03-01
Attending: INTERNAL MEDICINE
Payer: MEDICARE

## 2018-03-01 ENCOUNTER — HOSPITAL ENCOUNTER (OUTPATIENT)
Facility: CLINIC | Age: 73
Setting detail: OBSERVATION
Discharge: HOME | End: 2018-03-02
Attending: INTERNAL MEDICINE | Admitting: INTERNAL MEDICINE
Payer: MEDICARE

## 2018-03-01 DIAGNOSIS — Z79.01 LONG-TERM (CURRENT) USE OF ANTICOAGULANTS: ICD-10-CM

## 2018-03-01 DIAGNOSIS — I73.9 PVD (PERIPHERAL VASCULAR DISEASE) (H): ICD-10-CM

## 2018-03-01 PROBLEM — Z98.890 STATUS POST LEFT HEART CATHETERIZATION: Status: ACTIVE | Noted: 2018-03-01

## 2018-03-01 LAB
ERYTHROCYTE [DISTWIDTH] IN BLOOD BY AUTOMATED COUNT: 13.7 % (ref 10–15)
GLUCOSE BLDC GLUCOMTR-MCNC: 134 MG/DL (ref 70–99)
GLUCOSE BLDC GLUCOMTR-MCNC: 83 MG/DL (ref 70–99)
GLUCOSE BLDC GLUCOMTR-MCNC: 89 MG/DL (ref 70–99)
GLUCOSE BLDC GLUCOMTR-MCNC: 95 MG/DL (ref 70–99)
HCT VFR BLD AUTO: 28.8 % (ref 40–53)
HGB BLD-MCNC: 9 G/DL (ref 13.3–17.7)
INR PPP: 1.13 (ref 0.86–1.14)
KCT BLD-ACNC: 103 SEC (ref 75–150)
KCT BLD-ACNC: 111 SEC (ref 75–150)
KCT BLD-ACNC: 127 SEC (ref 75–150)
KCT BLD-ACNC: 127 SEC (ref 75–150)
KCT BLD-ACNC: 164 SEC (ref 75–150)
KCT BLD-ACNC: 164 SEC (ref 75–150)
KCT BLD-ACNC: 192 SEC (ref 75–150)
KCT BLD-ACNC: 204 SEC (ref 75–150)
KCT BLD-ACNC: 221 SEC (ref 75–150)
KCT BLD-ACNC: 261 SEC (ref 75–150)
MCH RBC QN AUTO: 29.6 PG (ref 26.5–33)
MCHC RBC AUTO-ENTMCNC: 31.3 G/DL (ref 31.5–36.5)
MCV RBC AUTO: 95 FL (ref 78–100)
PLATELET # BLD AUTO: 264 10E9/L (ref 150–450)
RBC # BLD AUTO: 3.04 10E12/L (ref 4.4–5.9)
WBC # BLD AUTO: 7.1 10E9/L (ref 4–11)

## 2018-03-01 PROCEDURE — C1769 GUIDE WIRE: HCPCS

## 2018-03-01 PROCEDURE — 93005 ELECTROCARDIOGRAM TRACING: CPT

## 2018-03-01 PROCEDURE — 40000065 ZZH STATISTIC EKG NON-CHARGEABLE

## 2018-03-01 PROCEDURE — 27211236 ZZH CATHETER -FFR CR18

## 2018-03-01 PROCEDURE — 93571 IV DOP VEL&/PRESS C FLO 1ST: CPT

## 2018-03-01 PROCEDURE — 00000146 ZZHCL STATISTIC GLUCOSE BY METER IP

## 2018-03-01 PROCEDURE — 25000128 H RX IP 250 OP 636: Performed by: INTERNAL MEDICINE

## 2018-03-01 PROCEDURE — 27211089 ZZH KIT ACIST INJECTOR CR3

## 2018-03-01 PROCEDURE — 36415 COLL VENOUS BLD VENIPUNCTURE: CPT | Performed by: INTERNAL MEDICINE

## 2018-03-01 PROCEDURE — 25000125 ZZHC RX 250: Performed by: STUDENT IN AN ORGANIZED HEALTH CARE EDUCATION/TRAINING PROGRAM

## 2018-03-01 PROCEDURE — C1887 CATHETER, GUIDING: HCPCS

## 2018-03-01 PROCEDURE — 82947 ASSAY GLUCOSE BLOOD QUANT: CPT | Performed by: INTERNAL MEDICINE

## 2018-03-01 PROCEDURE — 85347 COAGULATION TIME ACTIVATED: CPT

## 2018-03-01 PROCEDURE — 99152 MOD SED SAME PHYS/QHP 5/>YRS: CPT

## 2018-03-01 PROCEDURE — 25000128 H RX IP 250 OP 636: Performed by: STUDENT IN AN ORGANIZED HEALTH CARE EDUCATION/TRAINING PROGRAM

## 2018-03-01 PROCEDURE — C1894 INTRO/SHEATH, NON-LASER: HCPCS

## 2018-03-01 PROCEDURE — 27210742 ZZH CATH CR1

## 2018-03-01 PROCEDURE — 93572 IV DOP VEL&/PRESS C FLO EA: CPT | Mod: 26 | Performed by: INTERNAL MEDICINE

## 2018-03-01 PROCEDURE — G0378 HOSPITAL OBSERVATION PER HR: HCPCS

## 2018-03-01 PROCEDURE — 27210787 ZZH MANIFOLD CR2

## 2018-03-01 PROCEDURE — 85610 PROTHROMBIN TIME: CPT | Performed by: INTERNAL MEDICINE

## 2018-03-01 PROCEDURE — 93454 CORONARY ARTERY ANGIO S&I: CPT

## 2018-03-01 PROCEDURE — 40000141 ZZH STATISTIC PERIPHERAL IV START W/O US GUIDANCE

## 2018-03-01 PROCEDURE — 93572 IV DOP VEL&/PRESS C FLO EA: CPT

## 2018-03-01 PROCEDURE — 93454 CORONARY ARTERY ANGIO S&I: CPT | Mod: 26 | Performed by: INTERNAL MEDICINE

## 2018-03-01 PROCEDURE — 93571 IV DOP VEL&/PRESS C FLO 1ST: CPT | Mod: 26 | Performed by: INTERNAL MEDICINE

## 2018-03-01 PROCEDURE — 40000172 ZZH STATISTIC PROCEDURE PREP ONLY

## 2018-03-01 PROCEDURE — 99153 MOD SED SAME PHYS/QHP EA: CPT

## 2018-03-01 PROCEDURE — 85027 COMPLETE CBC AUTOMATED: CPT | Performed by: INTERNAL MEDICINE

## 2018-03-01 PROCEDURE — 27210946 ZZH KIT HC TOTES DISP CR8

## 2018-03-01 RX ORDER — ADENOSINE 3 MG/ML
12-12000 INJECTION, SOLUTION INTRAVENOUS
Status: DISCONTINUED | OUTPATIENT
Start: 2018-03-01 | End: 2018-03-01 | Stop reason: HOSPADM

## 2018-03-01 RX ORDER — LISINOPRIL 10 MG/1
10 TABLET ORAL 2 TIMES DAILY
Status: DISCONTINUED | OUTPATIENT
Start: 2018-03-01 | End: 2018-03-02 | Stop reason: HOSPADM

## 2018-03-01 RX ORDER — ATROPINE SULFATE 0.1 MG/ML
.5-1 INJECTION INTRAVENOUS
Status: DISCONTINUED | OUTPATIENT
Start: 2018-03-01 | End: 2018-03-01 | Stop reason: HOSPADM

## 2018-03-01 RX ORDER — ASPIRIN 325 MG
325 TABLET ORAL
Status: DISCONTINUED | OUTPATIENT
Start: 2018-03-01 | End: 2018-03-01 | Stop reason: HOSPADM

## 2018-03-01 RX ORDER — CLOPIDOGREL BISULFATE 75 MG/1
75 TABLET ORAL
Status: DISCONTINUED | OUTPATIENT
Start: 2018-03-01 | End: 2018-03-01 | Stop reason: HOSPADM

## 2018-03-01 RX ORDER — CLOPIDOGREL BISULFATE 75 MG/1
300-600 TABLET ORAL
Status: DISCONTINUED | OUTPATIENT
Start: 2018-03-01 | End: 2018-03-01 | Stop reason: HOSPADM

## 2018-03-01 RX ORDER — FENTANYL CITRATE 50 UG/ML
25-50 INJECTION, SOLUTION INTRAMUSCULAR; INTRAVENOUS
Status: DISCONTINUED | OUTPATIENT
Start: 2018-03-01 | End: 2018-03-01 | Stop reason: HOSPADM

## 2018-03-01 RX ORDER — DOPAMINE HYDROCHLORIDE 160 MG/100ML
2-20 INJECTION, SOLUTION INTRAVENOUS CONTINUOUS PRN
Status: DISCONTINUED | OUTPATIENT
Start: 2018-03-01 | End: 2018-03-01 | Stop reason: HOSPADM

## 2018-03-01 RX ORDER — SODIUM CHLORIDE 9 MG/ML
INJECTION, SOLUTION INTRAVENOUS CONTINUOUS
Status: DISCONTINUED | OUTPATIENT
Start: 2018-03-01 | End: 2018-03-01 | Stop reason: HOSPADM

## 2018-03-01 RX ORDER — PRASUGREL 10 MG/1
10-60 TABLET, FILM COATED ORAL
Status: DISCONTINUED | OUTPATIENT
Start: 2018-03-01 | End: 2018-03-01 | Stop reason: HOSPADM

## 2018-03-01 RX ORDER — ATROPINE SULFATE 0.1 MG/ML
0.5 INJECTION INTRAVENOUS EVERY 5 MIN PRN
Status: DISPENSED | OUTPATIENT
Start: 2018-03-01 | End: 2018-03-02

## 2018-03-01 RX ORDER — ARGATROBAN 1 MG/ML
150 INJECTION, SOLUTION INTRAVENOUS
Status: DISCONTINUED | OUTPATIENT
Start: 2018-03-01 | End: 2018-03-01 | Stop reason: HOSPADM

## 2018-03-01 RX ORDER — LIDOCAINE HYDROCHLORIDE 10 MG/ML
30 INJECTION, SOLUTION EPIDURAL; INFILTRATION; INTRACAUDAL; PERINEURAL
Status: DISCONTINUED | OUTPATIENT
Start: 2018-03-01 | End: 2018-03-01 | Stop reason: HOSPADM

## 2018-03-01 RX ORDER — ACETAMINOPHEN 650 MG/1
650 SUPPOSITORY RECTAL EVERY 4 HOURS PRN
Status: DISCONTINUED | OUTPATIENT
Start: 2018-03-01 | End: 2018-03-02 | Stop reason: HOSPADM

## 2018-03-01 RX ORDER — IOPAMIDOL 755 MG/ML
235 INJECTION, SOLUTION INTRAVASCULAR ONCE
Status: COMPLETED | OUTPATIENT
Start: 2018-03-01 | End: 2018-03-01

## 2018-03-01 RX ORDER — NALOXONE HYDROCHLORIDE 0.4 MG/ML
.1-.4 INJECTION, SOLUTION INTRAMUSCULAR; INTRAVENOUS; SUBCUTANEOUS
Status: DISCONTINUED | OUTPATIENT
Start: 2018-03-01 | End: 2018-03-01

## 2018-03-01 RX ORDER — LIDOCAINE 40 MG/G
CREAM TOPICAL
Status: DISCONTINUED | OUTPATIENT
Start: 2018-03-01 | End: 2018-03-02 | Stop reason: HOSPADM

## 2018-03-01 RX ORDER — EPINEPHRINE 1 MG/ML
0.3 INJECTION, SOLUTION, CONCENTRATE INTRAVENOUS
Status: DISCONTINUED | OUTPATIENT
Start: 2018-03-01 | End: 2018-03-01 | Stop reason: HOSPADM

## 2018-03-01 RX ORDER — LORAZEPAM 2 MG/ML
.5-2 INJECTION INTRAMUSCULAR EVERY 4 HOURS PRN
Status: DISCONTINUED | OUTPATIENT
Start: 2018-03-01 | End: 2018-03-01 | Stop reason: HOSPADM

## 2018-03-01 RX ORDER — EPTIFIBATIDE 2 MG/ML
2 INJECTION, SOLUTION INTRAVENOUS CONTINUOUS PRN
Status: DISCONTINUED | OUTPATIENT
Start: 2018-03-01 | End: 2018-03-01 | Stop reason: HOSPADM

## 2018-03-01 RX ORDER — SODIUM NITROPRUSSIDE 25 MG/ML
100-200 INJECTION INTRAVENOUS
Status: DISCONTINUED | OUTPATIENT
Start: 2018-03-01 | End: 2018-03-01 | Stop reason: HOSPADM

## 2018-03-01 RX ORDER — NITROGLYCERIN 0.4 MG/1
0.4 TABLET SUBLINGUAL EVERY 5 MIN PRN
Status: DISCONTINUED | OUTPATIENT
Start: 2018-03-01 | End: 2018-03-01 | Stop reason: HOSPADM

## 2018-03-01 RX ORDER — ASPIRIN 81 MG/1
81-324 TABLET, CHEWABLE ORAL
Status: DISCONTINUED | OUTPATIENT
Start: 2018-03-01 | End: 2018-03-01 | Stop reason: HOSPADM

## 2018-03-01 RX ORDER — ASPIRIN 81 MG/1
81 TABLET, CHEWABLE ORAL DAILY
Status: DISCONTINUED | OUTPATIENT
Start: 2018-03-01 | End: 2018-03-01

## 2018-03-01 RX ORDER — ONDANSETRON 2 MG/ML
4 INJECTION INTRAMUSCULAR; INTRAVENOUS EVERY 4 HOURS PRN
Status: DISCONTINUED | OUTPATIENT
Start: 2018-03-01 | End: 2018-03-01 | Stop reason: HOSPADM

## 2018-03-01 RX ORDER — HYDRALAZINE HYDROCHLORIDE 20 MG/ML
10-20 INJECTION INTRAMUSCULAR; INTRAVENOUS
Status: DISCONTINUED | OUTPATIENT
Start: 2018-03-01 | End: 2018-03-01 | Stop reason: HOSPADM

## 2018-03-01 RX ORDER — NALOXONE HYDROCHLORIDE 0.4 MG/ML
0.4 INJECTION, SOLUTION INTRAMUSCULAR; INTRAVENOUS; SUBCUTANEOUS EVERY 5 MIN PRN
Status: DISCONTINUED | OUTPATIENT
Start: 2018-03-01 | End: 2018-03-01 | Stop reason: HOSPADM

## 2018-03-01 RX ORDER — NICARDIPINE HYDROCHLORIDE 2.5 MG/ML
100 INJECTION INTRAVENOUS
Status: DISCONTINUED | OUTPATIENT
Start: 2018-03-01 | End: 2018-03-01 | Stop reason: HOSPADM

## 2018-03-01 RX ORDER — ACETAMINOPHEN 325 MG/1
650 TABLET ORAL EVERY 4 HOURS PRN
Status: DISCONTINUED | OUTPATIENT
Start: 2018-03-01 | End: 2018-03-02 | Stop reason: HOSPADM

## 2018-03-01 RX ORDER — ASPIRIN 81 MG/1
81 TABLET ORAL DAILY
Status: DISCONTINUED | OUTPATIENT
Start: 2018-03-02 | End: 2018-03-02 | Stop reason: HOSPADM

## 2018-03-01 RX ORDER — NITROGLYCERIN 5 MG/ML
100-500 VIAL (ML) INTRAVENOUS
Status: DISCONTINUED | OUTPATIENT
Start: 2018-03-01 | End: 2018-03-01 | Stop reason: HOSPADM

## 2018-03-01 RX ORDER — CLOPIDOGREL BISULFATE 75 MG/1
75 TABLET ORAL EVERY EVENING
Status: DISCONTINUED | OUTPATIENT
Start: 2018-03-01 | End: 2018-03-02 | Stop reason: HOSPADM

## 2018-03-01 RX ORDER — VERAPAMIL HYDROCHLORIDE 2.5 MG/ML
1-5 INJECTION, SOLUTION INTRAVENOUS
Status: DISCONTINUED | OUTPATIENT
Start: 2018-03-01 | End: 2018-03-01 | Stop reason: HOSPADM

## 2018-03-01 RX ORDER — METOPROLOL TARTRATE 1 MG/ML
5 INJECTION, SOLUTION INTRAVENOUS EVERY 5 MIN PRN
Status: DISCONTINUED | OUTPATIENT
Start: 2018-03-01 | End: 2018-03-01 | Stop reason: HOSPADM

## 2018-03-01 RX ORDER — FLUMAZENIL 0.1 MG/ML
0.2 INJECTION, SOLUTION INTRAVENOUS
Status: ACTIVE | OUTPATIENT
Start: 2018-03-01 | End: 2018-03-02

## 2018-03-01 RX ORDER — PROTAMINE SULFATE 10 MG/ML
1-5 INJECTION, SOLUTION INTRAVENOUS
Status: DISCONTINUED | OUTPATIENT
Start: 2018-03-01 | End: 2018-03-01 | Stop reason: HOSPADM

## 2018-03-01 RX ORDER — EPTIFIBATIDE 2 MG/ML
180 INJECTION, SOLUTION INTRAVENOUS EVERY 10 MIN PRN
Status: DISCONTINUED | OUTPATIENT
Start: 2018-03-01 | End: 2018-03-01 | Stop reason: HOSPADM

## 2018-03-01 RX ORDER — PROMETHAZINE HYDROCHLORIDE 25 MG/ML
6.25-25 INJECTION, SOLUTION INTRAMUSCULAR; INTRAVENOUS EVERY 4 HOURS PRN
Status: DISCONTINUED | OUTPATIENT
Start: 2018-03-01 | End: 2018-03-01 | Stop reason: HOSPADM

## 2018-03-01 RX ORDER — NITROGLYCERIN 20 MG/100ML
.07-2 INJECTION INTRAVENOUS CONTINUOUS PRN
Status: DISCONTINUED | OUTPATIENT
Start: 2018-03-01 | End: 2018-03-01 | Stop reason: HOSPADM

## 2018-03-01 RX ORDER — MAGNESIUM HYDROXIDE 1200 MG/15ML
1000 LIQUID ORAL CONTINUOUS PRN
Status: DISCONTINUED | OUTPATIENT
Start: 2018-03-01 | End: 2018-03-01 | Stop reason: HOSPADM

## 2018-03-01 RX ORDER — FENTANYL CITRATE 50 UG/ML
25-50 INJECTION, SOLUTION INTRAMUSCULAR; INTRAVENOUS
Status: DISPENSED | OUTPATIENT
Start: 2018-03-01 | End: 2018-03-02

## 2018-03-01 RX ORDER — LIDOCAINE 40 MG/G
CREAM TOPICAL
Status: DISCONTINUED | OUTPATIENT
Start: 2018-03-01 | End: 2018-03-01 | Stop reason: HOSPADM

## 2018-03-01 RX ORDER — NITROGLYCERIN 5 MG/ML
100-200 VIAL (ML) INTRAVENOUS
Status: DISCONTINUED | OUTPATIENT
Start: 2018-03-01 | End: 2018-03-01 | Stop reason: HOSPADM

## 2018-03-01 RX ORDER — NALOXONE HYDROCHLORIDE 0.4 MG/ML
.1-.4 INJECTION, SOLUTION INTRAMUSCULAR; INTRAVENOUS; SUBCUTANEOUS
Status: DISCONTINUED | OUTPATIENT
Start: 2018-03-01 | End: 2018-03-02 | Stop reason: HOSPADM

## 2018-03-01 RX ORDER — METHYLPREDNISOLONE SODIUM SUCCINATE 125 MG/2ML
125 INJECTION, POWDER, LYOPHILIZED, FOR SOLUTION INTRAMUSCULAR; INTRAVENOUS
Status: DISCONTINUED | OUTPATIENT
Start: 2018-03-01 | End: 2018-03-01 | Stop reason: HOSPADM

## 2018-03-01 RX ORDER — FUROSEMIDE 10 MG/ML
20-100 INJECTION INTRAMUSCULAR; INTRAVENOUS
Status: DISCONTINUED | OUTPATIENT
Start: 2018-03-01 | End: 2018-03-01 | Stop reason: HOSPADM

## 2018-03-01 RX ORDER — POTASSIUM CHLORIDE 29.8 MG/ML
20 INJECTION INTRAVENOUS
Status: DISCONTINUED | OUTPATIENT
Start: 2018-03-01 | End: 2018-03-01 | Stop reason: HOSPADM

## 2018-03-01 RX ORDER — DEXTROSE MONOHYDRATE 25 G/50ML
12.5-5 INJECTION, SOLUTION INTRAVENOUS EVERY 30 MIN PRN
Status: DISCONTINUED | OUTPATIENT
Start: 2018-03-01 | End: 2018-03-01 | Stop reason: HOSPADM

## 2018-03-01 RX ORDER — NALOXONE HYDROCHLORIDE 0.4 MG/ML
.2-.4 INJECTION, SOLUTION INTRAMUSCULAR; INTRAVENOUS; SUBCUTANEOUS
Status: ACTIVE | OUTPATIENT
Start: 2018-03-01 | End: 2018-03-02

## 2018-03-01 RX ORDER — DOBUTAMINE HYDROCHLORIDE 200 MG/100ML
2-20 INJECTION INTRAVENOUS CONTINUOUS PRN
Status: DISCONTINUED | OUTPATIENT
Start: 2018-03-01 | End: 2018-03-01 | Stop reason: HOSPADM

## 2018-03-01 RX ORDER — ONDANSETRON 2 MG/ML
4 INJECTION INTRAMUSCULAR; INTRAVENOUS EVERY 6 HOURS PRN
Status: DISCONTINUED | OUTPATIENT
Start: 2018-03-01 | End: 2018-03-02 | Stop reason: HOSPADM

## 2018-03-01 RX ORDER — HEPARIN SODIUM 1000 [USP'U]/ML
1000-10000 INJECTION, SOLUTION INTRAVENOUS; SUBCUTANEOUS EVERY 5 MIN PRN
Status: DISCONTINUED | OUTPATIENT
Start: 2018-03-01 | End: 2018-03-01 | Stop reason: HOSPADM

## 2018-03-01 RX ORDER — FLUMAZENIL 0.1 MG/ML
0.2 INJECTION, SOLUTION INTRAVENOUS
Status: DISCONTINUED | OUTPATIENT
Start: 2018-03-01 | End: 2018-03-01 | Stop reason: HOSPADM

## 2018-03-01 RX ORDER — NIFEDIPINE 10 MG/1
10 CAPSULE ORAL
Status: DISCONTINUED | OUTPATIENT
Start: 2018-03-01 | End: 2018-03-01 | Stop reason: HOSPADM

## 2018-03-01 RX ORDER — PROTAMINE SULFATE 10 MG/ML
25-100 INJECTION, SOLUTION INTRAVENOUS EVERY 5 MIN PRN
Status: DISCONTINUED | OUTPATIENT
Start: 2018-03-01 | End: 2018-03-01 | Stop reason: HOSPADM

## 2018-03-01 RX ORDER — ARGATROBAN 1 MG/ML
350 INJECTION, SOLUTION INTRAVENOUS
Status: DISCONTINUED | OUTPATIENT
Start: 2018-03-01 | End: 2018-03-01 | Stop reason: HOSPADM

## 2018-03-01 RX ORDER — ONDANSETRON 4 MG/1
4 TABLET, ORALLY DISINTEGRATING ORAL EVERY 6 HOURS PRN
Status: DISCONTINUED | OUTPATIENT
Start: 2018-03-01 | End: 2018-03-02 | Stop reason: HOSPADM

## 2018-03-01 RX ORDER — DEXTROSE MONOHYDRATE 50 MG/ML
INJECTION, SOLUTION INTRAVENOUS CONTINUOUS
Status: DISCONTINUED | OUTPATIENT
Start: 2018-03-01 | End: 2018-03-02

## 2018-03-01 RX ORDER — DIPHENHYDRAMINE HYDROCHLORIDE 50 MG/ML
25-50 INJECTION INTRAMUSCULAR; INTRAVENOUS
Status: DISCONTINUED | OUTPATIENT
Start: 2018-03-01 | End: 2018-03-01 | Stop reason: HOSPADM

## 2018-03-01 RX ORDER — PHENYLEPHRINE HCL IN 0.9% NACL 1 MG/10 ML
20-100 SYRINGE (ML) INTRAVENOUS
Status: DISCONTINUED | OUTPATIENT
Start: 2018-03-01 | End: 2018-03-01 | Stop reason: HOSPADM

## 2018-03-01 RX ORDER — POTASSIUM CHLORIDE 7.45 MG/ML
10 INJECTION INTRAVENOUS
Status: DISCONTINUED | OUTPATIENT
Start: 2018-03-01 | End: 2018-03-01 | Stop reason: HOSPADM

## 2018-03-01 RX ADMIN — HEPARIN SODIUM 9600 UNITS: 1000 INJECTION, SOLUTION INTRAVENOUS; SUBCUTANEOUS at 17:08

## 2018-03-01 RX ADMIN — ADENOSINE 140 MCG/KG/MIN: 3 INJECTION, SOLUTION INTRAVENOUS at 16:40

## 2018-03-01 RX ADMIN — FENTANYL CITRATE 50 MCG: 50 INJECTION, SOLUTION INTRAMUSCULAR; INTRAVENOUS at 17:16

## 2018-03-01 RX ADMIN — SODIUM CHLORIDE: 9 INJECTION, SOLUTION INTRAVENOUS at 14:28

## 2018-03-01 RX ADMIN — DEXTROSE MONOHYDRATE: 50 INJECTION, SOLUTION INTRAVENOUS at 20:45

## 2018-03-01 RX ADMIN — FENTANYL CITRATE 50 MCG: 50 INJECTION, SOLUTION INTRAMUSCULAR; INTRAVENOUS at 16:17

## 2018-03-01 RX ADMIN — FENTANYL CITRATE 25 MCG: 50 INJECTION, SOLUTION INTRAMUSCULAR; INTRAVENOUS at 16:48

## 2018-03-01 RX ADMIN — MIDAZOLAM 1 MG: 1 INJECTION INTRAMUSCULAR; INTRAVENOUS at 16:18

## 2018-03-01 RX ADMIN — IOPAMIDOL 235 ML: 755 INJECTION, SOLUTION INTRAVASCULAR at 18:00

## 2018-03-01 RX ADMIN — MIDAZOLAM 0.5 MG: 1 INJECTION INTRAMUSCULAR; INTRAVENOUS at 16:21

## 2018-03-01 RX ADMIN — FENTANYL CITRATE 25 MCG: 50 INJECTION, SOLUTION INTRAMUSCULAR; INTRAVENOUS at 16:22

## 2018-03-01 RX ADMIN — FENTANYL CITRATE 50 MCG: 50 INJECTION, SOLUTION INTRAMUSCULAR; INTRAVENOUS at 17:32

## 2018-03-01 RX ADMIN — HEPARIN SODIUM 500 UNITS: 1000 INJECTION, SOLUTION INTRAVENOUS; SUBCUTANEOUS at 16:18

## 2018-03-01 RX ADMIN — HEPARIN SODIUM 8000 UNITS: 1000 INJECTION, SOLUTION INTRAVENOUS; SUBCUTANEOUS at 16:35

## 2018-03-01 RX ADMIN — HEPARIN SODIUM 4000 UNITS: 1000 INJECTION, SOLUTION INTRAVENOUS; SUBCUTANEOUS at 17:19

## 2018-03-01 RX ADMIN — ADENOSINE 140 MCG/KG/MIN: 3 INJECTION, SOLUTION INTRAVENOUS at 17:37

## 2018-03-01 RX ADMIN — MIDAZOLAM 0.5 MG: 1 INJECTION INTRAMUSCULAR; INTRAVENOUS at 16:49

## 2018-03-01 RX ADMIN — FENTANYL CITRATE 50 MCG: 50 INJECTION, SOLUTION INTRAMUSCULAR; INTRAVENOUS at 17:07

## 2018-03-01 RX ADMIN — HEPARIN SODIUM 5000 UNITS: 1000 INJECTION, SOLUTION INTRAVENOUS; SUBCUTANEOUS at 16:43

## 2018-03-01 RX ADMIN — MIDAZOLAM 1 MG: 1 INJECTION INTRAMUSCULAR; INTRAVENOUS at 17:33

## 2018-03-01 RX ADMIN — HEPARIN SODIUM 1500 UNITS: 1000 INJECTION, SOLUTION INTRAVENOUS; SUBCUTANEOUS at 16:17

## 2018-03-01 NOTE — IP AVS SNAPSHOT
MRN:[id number]                      After Visit Summary   3/1/2018    Amos Walker    MRN: [id number]           Thank you!     Thank you for choosing [facility] for your care. Our goal is always to provide you with excellent care. Hearing back from our patients is one way we can continue to improve our services. Please take a few minutes to complete the written survey that you may receive in the mail after you visit with us. Thank you!        Patient Information     Date Of Birth          1945        Designated Caregiver       Most Recent Value    Caregiver    Will someone help with your care after discharge? no      About your hospital stay     You were admitted on:  March 1, 2018 You last received care in the:  Unit 6D Observation [facility]    You were discharged on:  March 2, 2018        Reason for your hospital stay       You had a coronary angiogram.                  Who to Call     For medical emergencies, please call 911.  For non-urgent questions about your medical care, please call your primary care provider or clinic, [phone]          Attending Provider     Provider Specialty    Amor, MD [facility]    Hitesh Morris MD Clinical Cardiac Electrophysiology    Ghanshyam Moore MD Cardiology       Primary Care Provider Office Phone # Fax #    Racheal Swift [phone]      After Care Instructions     Activity       Your activity upon discharge: activity as tolerated, specifics attached            Diet       Follow this diet upon discharge: Orders Placed This Encounter      Regular Diet Adult                  Follow-up Appointments     Adult [facility] Follow-up and recommended labs and tests       Follow up with primary care provider, Racheal Swift, within 7 days for hospital follow- up.  No follow up labs or test are needed.    Please follow up with cardiology in one month as arranged      Appointments on [place] and/or [facility]  (with [facility] or [facility] provider or service). Call [phone] if you haven't heard regarding these appointments within 7 days of discharge.                  Your next 10 appointments already scheduled     Mar 05, 2018 11:00 AM CST   (Arrive by 10:45 AM)   Return Visit with Racheal Swift MD   [facility] ([facility])    [address]  [address] [facility] [id number] [phone]            Mar 06, 2018 10:00 AM CST   Return Visit with Brayan Mcclain DPM   [facility] ([facility])    [address] [place] [address] [phone]            Mar 13, 2018  9:30 AM CDT   Return Visit with Brayan Mcclain DPM   [facility] ([facility])    [address] [place] [address]   [phone]            Mar 13, 2018   Procedure with Anita Stock MD   [facility] [place], Same Day Surgery (--)    [address] [address] [phone]            Mar 20, 2018 10:00 AM CDT   Return Visit with Brayan Mcclain DPM   [facility] ([facility])    [address] [place] [address]   [phone]            Mar 27, 2018  9:30 AM CDT   Return Visit with Brayan Mcclain DPM   [facility])    [address] [place] [address] [phone]            Apr 27, 2018 10:25 AM CDT   (Arrive by 10:10 AM)   Return Visit with Racheal Swift MD   [facility] ([facility])    [address]  [address] [facility] [address] [phone]            Jun 20, 2018 10:30 AM CDT   RETURN RETINA with Jen Aranda MD   [facility] ([facility])    [address]  [address] [address] [phone]              Further instructions from your care team        Going Home after Coronary Angioplasty or Stent Placement       Name: Amos Walker  Medical Record Number:  [id number]  Today's Date: March 2, 2018        For 24 hours:         Have an adult stay with you for 24 hours.         Relax and take it easy.         Drink plenty of fluids.         You may eat your normal diet, unless your doctor tells you otherwise.         Do NOT make any important or legal decisions.         Do NOT drive or operate machines at home or at work.         Do NOT drink alcohol.      Do NOT smoke.     Medicines:         If you have begun Plavix (clopidogrel), Effient (prasugrel), or Brilinta (ticagrelor), do not stop taking it until you talk to your heart doctor (cardiologist).         If you are on metformin (Glucophage), do not restart it until you have blood tests (within 2 to 3 days after discharge). When your doctor tells you it is safe, you may restart the metformin.         If you have stopped any other medicines, check with your nurse or provider about when to restart them.    Care of groin site:         Remove the Band-Aid after 24 hours. If there is minor oozing, apply another Band-aid and remove it after 12 hours.          Do NOT take a bath, or use a hot tub or pool for at least 3 days. You may shower.          It is normal to have a small bruise or lump at the site.         Do not scrub the site.         Do not use lotion or powder near the puncture site for 3 days.         For the first 2 days: Do not stoop or squat. When you cough, sneeze or move your bowels, hold your hand over the puncture site and press gently.         Do not lift more than 10 pounds for at least 3 to 5 days.         For 2 days, do NOT have sex or do any heavy exercise.     If you start bleeding from the site in your groin:  Lie down flat and press firmly on the site.  Call your physician immediately, or, come to the emergency room.      Call 911 right away if you have bleeding that is heavy or does not  stop.     Call your doctor if:         You have a large or growing hard lump around the site.         The site is red, swollen, hot or tender.         Blood or fluid is draining from the site.         You have chills or a fever greater than 101 F (38 C).         Your leg or arm turns bluish, feels numb or cool.         You have hives, a rash or unusual itching.       ADDITIONAL INSTRUCTIONS: Please follow up with PCP in one week and with cardiology as arranged.     [facility] [place]:   [phone] (7 days a week)      Cardiology Fellow on call (24 hours per day) at [facility]:   [phone] (ask for Cardiology Fellow on call)      Number where we can reach you:  ____________    Pending Results     Date and Time Order Name Status Description    3/1/2018 1352 EKG 12-lead, tracing only Preliminary             Statement of Approval     Ordered          03/02/18 0905  I have reviewed and agree with all the recommendations and orders detailed in this document.  EFFECTIVE NOW     Approved and electronically signed by:  Sinai Nolan APRN CNP             Admission Information     Date & Time Provider Department Dept. Phone    3/1/2018 Ghanshyam Moore MD Unit 6D Observation [facility] [phone]      Your Vitals Were     Blood Pressure Pulse Temperature Respirations Weight Pulse Oximetry    103/53 (BP Location: Right arm) 84 98.1  F (36.7  C) (Oral) 18 77.6 kg (171 lb 1.2 oz) 98%    BMI (Body Mass Index)                   21.67 kg/m2           MyChart Information     [facility] gives you secure access to your electronic health record. If you see a primary care provider, you can also send messages to your care team and make appointments. If you have questions, please call your primary care clinic.  If you do not have a primary care provider, please call [phone] and they will assist you.        Care EveryWhere ID     This is your Care EveryWhere ID. This could be used by  other organizations to access your [place] medical records  [id number]        Equal Access to Services     SAMSON MELTON : Nataliia Bedoya, miguel abebe, hollie jeffery, yolanda lopez. So [facility] [phone].    ATENCIÓN: Si habla español, tiene a rodrigues disposición servicios gratuitos de asistencia lingüística. Llame al [phone].    We comply with applicable federal civil rights laws and [place] laws. We do not discriminate on the basis of race, color, national origin, age, disability, sex, sexual orientation, or gender identity.               Review of your medicines      CONTINUE these medicines which may have CHANGED, or have new prescriptions. If we are uncertain of the size of tablets/capsules you have at home, strength may be listed as something that might have changed.        Dose / Directions    clopidogrel 75 MG tablet   Commonly known as:  PLAVIX   This may have changed:  when to take this   Used for:  Peripheral vascular disease, unspecified        Dose:  75 mg   Take 1 tablet (75 mg) by mouth daily   Quantity:  30 tablet   Refills:  11       gentamicin 0.1 % cream   Commonly known as:  GARAMYCIN   This may have changed:    - when to take this  - reasons to take this  - additional instructions   Used for:  Ulcer of right lower leg, with fat layer exposed (H), Chronic venous hypertension with ulcer involving right side (H), Type 2 diabetes, controlled, with neuropathy (H)        Apply topically daily To right leg ulcer.   Quantity:  30 g   Refills:  5       insulin glargine 100 UNIT/ML injection   Commonly known as:  LANTUS SOLOSTAR   This may have changed:  when to take this   Used for:  Type 2 diabetes mellitus with diabetic peripheral angiopathy without gangrene, with long-term current use of insulin (H)        Dose:  25 Units   Inject 25 Units Subcutaneous At Bedtime   Quantity:  15 mL   Refills:  2         CONTINUE these medicines which have  NOT CHANGED        Dose / Directions    ammonium lactate 12 % cream   Commonly known as:  LAC-HYDRIN   Used for:  Venous stasis, Type 2 diabetes, controlled, with neuropathy (H)        Apply topically 2 times daily as needed for dry skin   Quantity:  385 g   Refills:  3       ascorbic acid 500 MG Tabs   Used for:  Ulcer of right lower leg, with fat layer exposed (H)        Dose:  500 mg   Take 1 tablet (500 mg) by mouth 2 times daily   Quantity:  30 tablet   Refills:  0       ASPIRIN PO        Dose:  81 mg   Take 81 mg by mouth daily   Refills:  0       blood glucose monitoring lancets   Used for:  Type 2 diabetes, uncontrolled, with neuropathy (H)        Use to test blood sugars 2 as directed.   Quantity:  3 Box   Refills:  3       blood glucose monitoring test strip   Commonly known as:  ONETOUCH ULTRA   Used for:  Type 2 diabetes mellitus (H)        Use to test blood sugars 2 times daily or as directed.   Quantity:  60 strip   Refills:  11       cephALEXin 500 MG capsule   Commonly known as:  KEFLEX   Used for:  Ulcer of right lower extremity with fat layer exposed (H), Skin ulcer of right foot with fat layer exposed (H), Type II or unspecified type diabetes mellitus with neurological manifestations, not stated as uncontrolled(250.60) (H), Diabetes mellitus with peripheral vascular disease (H)        Dose:  500 mg   Take 1 capsule (500 mg) by mouth 2 times daily   Quantity:  28 capsule   Refills:  0       COLACE PO        Dose:  100 mg   Take 100 mg by mouth daily   Refills:  0       ferrous sulfate 325 (65 FE) MG tablet   Commonly known as:  IRON   Used for:  Peripheral vascular disease, unspecified        Dose:  325 mg   Take 1 tablet (325 mg) by mouth 2 times daily   Quantity:  60 tablet   Refills:  11       insulin pen needle 31G X 8 MM   Commonly known as:  B-D U/F   Used for:  Diabetes mellitus, type II (H)        Use 1 daily o as directed   Quantity:  100 each   Refills:  3       LISINOPRIL PO        Dose:  " 20 mg   Take 20 mg by mouth 2 times daily   Refills:  0       nateglinide 120 MG tablet   Commonly known as:  STARLIX   Used for:  Type 2 diabetes, controlled, with neuropathy (H)        TAKE 1 TABLET BY MOUTH THREE TIMES DAILY BEFORE MEALS   Quantity:  90 tablet   Refills:  11       OPTIFOAM 6\"X6\" Pads   Used for:  Ulcer of right leg, with fat layer exposed (H)        Dose:  1 Box   1 Box once a week   Quantity:  1 each   Refills:  6       * order for DME   Used for:  Varicose veins of lower extremities with other complications, Venous stasis ulcer of right lower extremity (H)        Please measure and distribute 1 pair of 20mm Hg - 30mm Hg knee high ULCER CARE open or closed toe compression stockings.  Patient has a size 13 foot and please take this into consideration.  Jobst or equivalent   Quantity:  2 each   Refills:  1       * order for DME   Used for:  Varicose veins of both lower extremities with complications        Please measure and distribute 1 pair of 20mmHg - 30mmHg knee high open or closed toe compression stockings. Jobst ultrasheer or equivalent.   Quantity:  3 each   Refills:  12       * order for DME   Used for:  Varicose veins of bilateral lower extremities with other complications        Please measure and distribute 1 pair of 30mmHg - 40mmHg knee high open toe ulcercare compression stockings. Jobst ultrasheer or equivalent.   Quantity:  2 each   Refills:  6       sildenafil 50 MG tablet   Commonly known as:  VIAGRA   Used for:  Vasculogenic erectile dysfunction, unspecified vasculogenic erectile dysfunction type        Dose:  50 mg   Take 1 tablet (50 mg) by mouth daily as needed for erectile dysfunction   Quantity:  10 tablet   Refills:  11       silver sulfADIAZINE 1 % cream   Commonly known as:  SILVADENE   Used for:  Ulcer of right lower leg, with fat layer exposed (H), Chronic venous hypertension with ulcer involving right side (H), Type 2 diabetes, controlled, with neuropathy (H)        " Apply topically daily To affected areas on right foot and leg.   Quantity:  85 g   Refills:  5       simvastatin 10 MG tablet   Commonly known as:  ZOCOR   Used for:  Type 2 diabetes, controlled, with neuropathy (H)        Dose:  10 mg   Take 1 tablet (10 mg) by mouth At Bedtime   Quantity:  90 tablet   Refills:  3       * sitagliptin 100 MG tablet   Commonly known as:  JANUVIA   Used for:  Type 2 diabetes, controlled, with neuropathy (H)        Dose:  100 mg   Take 1 tablet (100 mg) by mouth daily   Quantity:  90 tablet   Refills:  3       * sitagliptin 100 MG tablet   Commonly known as:  JANUVIA   Used for:  Type 2 diabetes, HbA1c goal < 7% (H)        Dose:  100 mg   Take 1 tablet (100 mg) by mouth daily   Quantity:  90 tablet   Refills:  1       triamcinolone 0.1 % cream   Commonly known as:  KENALOG   Used for:  Dermatitis of left foot        Apply sparingly to left heel daily.   Quantity:  30 g   Refills:  1       VITAMIN C PO        Dose:  1000 mg   Take 1,000 mg by mouth   Refills:  0       VITAMIN D (CHOLECALCIFEROL) PO        Dose:  1000 Units   Take 1,000 Units by mouth 2 times daily   Refills:  0       * Notice:  This list has 5 medication(s) that are the same as other medications prescribed for you. Read the directions carefully, and ask your doctor or other care provider to review them with you.             Protect others around you: Learn how to safely use, store and throw away your medicines at www.disposemymeds.org.             Medication List: This is a list of all your medications and when to take them. Check marks below indicate your daily home schedule. Keep this list as a reference.      Medications           Morning Afternoon Evening Bedtime As Needed    ammonium lactate 12 % cream   Commonly known as:  LAC-HYDRIN   Apply topically 2 times daily as needed for dry skin                                ascorbic acid 500 MG Tabs   Take 1 tablet (500 mg) by mouth 2 times daily                          "       ASPIRIN PO   Take 81 mg by mouth daily   Last time this was given:  81 mg on 3/2/2018  8:17 AM                                blood glucose monitoring lancets   Use to test blood sugars 2 as directed.                                blood glucose monitoring test strip   Commonly known as:  ONETOUCH ULTRA   Use to test blood sugars 2 times daily or as directed.                                cephALEXin 500 MG capsule   Commonly known as:  KEFLEX   Take 1 capsule (500 mg) by mouth 2 times daily                                clopidogrel 75 MG tablet   Commonly known as:  PLAVIX   Take 1 tablet (75 mg) by mouth daily   Last time this was given:  75 mg on 3/2/2018  1:01 AM                                COLACE PO   Take 100 mg by mouth daily                                ferrous sulfate 325 (65 FE) MG tablet   Commonly known as:  IRON   Take 1 tablet (325 mg) by mouth 2 times daily                                gentamicin 0.1 % cream   Commonly known as:  GARAMYCIN   Apply topically daily To right leg ulcer.                                insulin glargine 100 UNIT/ML injection   Commonly known as:  LANTUS SOLOSTAR   Inject 25 Units Subcutaneous At Bedtime                                insulin pen needle 31G X 8 MM   Commonly known as:  B-D U/F   Use 1 daily o as directed                                LISINOPRIL PO   Take 20 mg by mouth 2 times daily   Last time this was given:  10 mg on 3/2/2018  1:01 AM                                nateglinide 120 MG tablet   Commonly known as:  STARLIX   TAKE 1 TABLET BY MOUTH THREE TIMES DAILY BEFORE MEALS                                OPTIFOAM 6\"X6\" Pads   1 Box once a week                                * order for DME   Please measure and distribute 1 pair of 20mm Hg - 30mm Hg knee high ULCER CARE open or closed toe compression stockings.  Patient has a size 13 foot and please take this into consideration.  Jobst or equivalent                                * order " for DME   Please measure and distribute 1 pair of 20mmHg - 30mmHg knee high open or closed toe compression stockings. Jobst ultrasheer or equivalent.                                * order for DME   Please measure and distribute 1 pair of 30mmHg - 40mmHg knee high open toe ulcercare compression stockings. Jobst ultrasheer or equivalent.                                sildenafil 50 MG tablet   Commonly known as:  VIAGRA   Take 1 tablet (50 mg) by mouth daily as needed for erectile dysfunction                                silver sulfADIAZINE 1 % cream   Commonly known as:  SILVADENE   Apply topically daily To affected areas on right foot and leg.                                simvastatin 10 MG tablet   Commonly known as:  ZOCOR   Take 1 tablet (10 mg) by mouth At Bedtime                                * sitagliptin 100 MG tablet   Commonly known as:  JANUVIA   Take 1 tablet (100 mg) by mouth daily                                * sitagliptin 100 MG tablet   Commonly known as:  JANUVIA   Take 1 tablet (100 mg) by mouth daily                                triamcinolone 0.1 % cream   Commonly known as:  KENALOG   Apply sparingly to left heel daily.                                VITAMIN C PO   Take 1,000 mg by mouth                                VITAMIN D (CHOLECALCIFEROL) PO   Take 1,000 Units by mouth 2 times daily                                * Notice:  This list has 5 medication(s) that are the same as other medications prescribed for you. Read the directions carefully, and ask your doctor or other care provider to review them with you.

## 2018-03-01 NOTE — PROGRESS NOTES
Pt arrives to 2a, with spouse, for CORS. Pre procedure assessment completed. H&P is up to date. Consent needs to be signed. PIV placed, labs completed prior to 2a arrival. Pt prepped.   CCL notified pt has allergy to povidone iodine, which was a solution that was put on a foot wound per pt and pt developed rash.

## 2018-03-01 NOTE — IP AVS SNAPSHOT
Unit 6D Observation [address] [address]    Phone:  [phone]    Fax:  [phone]                                       After Visit Summary   3/1/2018    Amos Walker    MRN: [id number]           After Visit Summary Signature Page     I have received my discharge instructions, and my questions have been answered. I have discussed any challenges I see with this plan with the nurse or doctor.    ..........................................................................................................................................  Patient/Patient Representative Signature      ..........................................................................................................................................  Patient Representative Print Name and Relationship to Patient    ..................................................               ................................................  Date                                            Time    ..........................................................................................................................................  Reviewed by Signature/Title    ...................................................              ..............................................  Date                                                            Time

## 2018-03-01 NOTE — PROGRESS NOTES
Dr Roberts here for consent, clarified pt does not need Aspirin 325mg as pt took own Aspirin 81mg and Plavix today. Pt ready for procedure.

## 2018-03-02 VITALS
SYSTOLIC BLOOD PRESSURE: 103 MMHG | BODY MASS INDEX: 21.67 KG/M2 | RESPIRATION RATE: 18 BRPM | OXYGEN SATURATION: 98 % | WEIGHT: 171.08 LBS | DIASTOLIC BLOOD PRESSURE: 53 MMHG | TEMPERATURE: 98.1 F | HEART RATE: 84 BPM

## 2018-03-02 LAB
ANION GAP SERPL CALCULATED.3IONS-SCNC: 7 MMOL/L (ref 3–14)
BUN SERPL-MCNC: 26 MG/DL (ref 7–30)
CALCIUM SERPL-MCNC: 7.9 MG/DL (ref 8.5–10.1)
CHLORIDE SERPL-SCNC: 104 MMOL/L (ref 94–109)
CO2 SERPL-SCNC: 24 MMOL/L (ref 20–32)
CREAT SERPL-MCNC: 1.2 MG/DL (ref 0.66–1.25)
ERYTHROCYTE [DISTWIDTH] IN BLOOD BY AUTOMATED COUNT: 14 % (ref 10–15)
GFR SERPL CREATININE-BSD FRML MDRD: 60 ML/MIN/1.7M2
GLUCOSE BLDC GLUCOMTR-MCNC: 199 MG/DL (ref 70–99)
GLUCOSE SERPL-MCNC: 196 MG/DL (ref 70–99)
GLUCOSE SERPL-MCNC: 95 MG/DL (ref 70–99)
HCT VFR BLD AUTO: 24.9 % (ref 40–53)
HGB BLD-MCNC: 8 G/DL (ref 13.3–17.7)
INTERPRETATION ECG - MUSE: NORMAL
MCH RBC QN AUTO: 29.9 PG (ref 26.5–33)
MCHC RBC AUTO-ENTMCNC: 32.1 G/DL (ref 31.5–36.5)
MCV RBC AUTO: 93 FL (ref 78–100)
PLATELET # BLD AUTO: 221 10E9/L (ref 150–450)
POTASSIUM SERPL-SCNC: 4.7 MMOL/L (ref 3.4–5.3)
RBC # BLD AUTO: 2.68 10E12/L (ref 4.4–5.9)
SODIUM SERPL-SCNC: 134 MMOL/L (ref 133–144)
WBC # BLD AUTO: 4.6 10E9/L (ref 4–11)

## 2018-03-02 PROCEDURE — 00000146 ZZHCL STATISTIC GLUCOSE BY METER IP

## 2018-03-02 PROCEDURE — 25000128 H RX IP 250 OP 636: Performed by: STUDENT IN AN ORGANIZED HEALTH CARE EDUCATION/TRAINING PROGRAM

## 2018-03-02 PROCEDURE — 25000132 ZZH RX MED GY IP 250 OP 250 PS 637: Mod: GY | Performed by: INTERNAL MEDICINE

## 2018-03-02 PROCEDURE — A9270 NON-COVERED ITEM OR SERVICE: HCPCS | Mod: GY | Performed by: STUDENT IN AN ORGANIZED HEALTH CARE EDUCATION/TRAINING PROGRAM

## 2018-03-02 PROCEDURE — 80048 BASIC METABOLIC PNL TOTAL CA: CPT | Performed by: NURSE PRACTITIONER

## 2018-03-02 PROCEDURE — 85027 COMPLETE CBC AUTOMATED: CPT | Performed by: NURSE PRACTITIONER

## 2018-03-02 PROCEDURE — G0378 HOSPITAL OBSERVATION PER HR: HCPCS

## 2018-03-02 PROCEDURE — A9270 NON-COVERED ITEM OR SERVICE: HCPCS | Mod: GY | Performed by: INTERNAL MEDICINE

## 2018-03-02 PROCEDURE — 25000132 ZZH RX MED GY IP 250 OP 250 PS 637: Mod: GY | Performed by: STUDENT IN AN ORGANIZED HEALTH CARE EDUCATION/TRAINING PROGRAM

## 2018-03-02 PROCEDURE — 36415 COLL VENOUS BLD VENIPUNCTURE: CPT | Performed by: NURSE PRACTITIONER

## 2018-03-02 RX ADMIN — ASPIRIN 81 MG: 81 TABLET, COATED ORAL at 08:17

## 2018-03-02 RX ADMIN — FENTANYL CITRATE 25 MCG: 50 INJECTION, SOLUTION INTRAMUSCULAR; INTRAVENOUS at 00:01

## 2018-03-02 RX ADMIN — CLOPIDOGREL 75 MG: 75 TABLET, FILM COATED ORAL at 01:01

## 2018-03-02 RX ADMIN — LISINOPRIL 10 MG: 10 TABLET ORAL at 01:01

## 2018-03-02 NOTE — DISCHARGE SUMMARY
[address] [address]  p: [phone]    Discharge Summary: Cardiology Service    Amos Walker MRN# [id number]   YOB: 1945 Age: 72 year old       Admission Date: 3/1/2018  Discharge Date: 03/02/18      Discharge Diagnoses:  # Coronary Artery Disease  # DMII  # PAD    Pertinent Procedures:  1. Coronary angiogram    Imaging with results:  Coronary Angiogram  CORONARY ANGIOGRAM:   1. Both coronary arteries arise from their respective cusps.  2. Co-dominant.  3. LM is a large caliber vessel, is moderately calcified and trfurcates into an LAD, ramus intermedius, and LCx. LM has an eccentric 40% ostial stenosis with focal calcification.  4. LAD is a large caliber vessel, is moderately calcified proximally, supplies the entire apex (type 3), and gives rise to septal perforators, small caliber D1 and small caliber D2.  The pLAD has a long 50-60% stenosis, mLAD has a long 60% stenosis, and the D1 has a 60% stenosis.    5. LCX is a moderately calcified large caliber vessel and gives rise to medium caliber OM1 before continuing as a small AV-groove LCx.  The OM1 has a 40% stenosis.  6. Ramus intermedius is a medium caliber vessel and has a 40% stenosis.  7. RCA is a medium caliber co-dominant vessel and supplies a small PDA and small PL. The mRCA has a long 60-70% stenosis.     FUNCTIONAL ASSESSMENT:  1. LAD/LM  Adequate anticoagulation was was obtained.  A Jus blue wire was advanced distal to the mLAD lesion and the NAVVUS catheter was advanced distal to the lesion. Hyperemia was induced with intravenous adenosine and the FFR was measured.   -LM FFR 0.90  -pLAD FFR 0.82  -mLAD FFR 0.67     2. RCA  Adequate anticoagulation was was obtained.  A Jus blue wire was advanced distal to the mRCA lesion and the NAVVUS catheter was advanced distal to the lesion. Hyperemia was induced with intravenous adenosine and the FFR was measured.   -mRCA FFR  0.79     FEMORAL ANGIOGRAPHY  1. The right external iliac and common femoral arteries showed significant peripheral artery disease with severe calcification.  Access site was confirmed in the common femoral artery. Given severe calcification, arteriotomy closure was not performed.     COMPLICATIONS:  1. The patient was found to have an infiltrated IV in the left antecubital fossa. Unfortunately this was discovered after 13,000 units of heparin in addition to adenosine was infused. The ACT was found to be very low (140 -> 103 s) confirming that a considerable volume of heparin went into the subcutaneous space. The IV was removed and pressure held with complete resolution of swelling. Patient denied pain. At this point there was no IV access so a 4Fr long sheath was placed in the right femoral vein to allow for intravenous adenosine administration for repeat FFR.     SUMMARY:   1. Abnormal stress test secondary to obstructive coronary artery disease.  2. Two vessel coronary artery disease with left main involvement. This includes 40% ostial left main stenosis, a long 50-60% proximal LAD stenosis, long 60% mLAD stenosis (FFR 0.67), and mRCA stenosis (FFR 0.79). Though the left main FFR is 0.90, this will require stenting from the ostial LM through the mLAD in addition to stenting of the mRCA.  3. Angiographically significant peripheral arterial disease with severe calcification.  4. Sheaths were secured in place.     PLAN:   1. Will plan to perform PCI of the left main through the mid LAD as well as of the mRCA. Given considerable contrast load (235 mL) will bring the patient back next week to stage this intervention.  2. Continue aspirin 81 mg po daily lifelong.  3. Continue clopidogrel 75 mg po daily.  4. Bedrest per protocol.  5. Admit to observation.  6. Continued medical management and lifestyle modification for cardiovascular risk factor optimization.     Brief HPI:  Amos Walker is a 72 year old male admitted  on 3/1/2018 for monitoring after elective angiogram. Heparin extravasated, thus patient kept overnight for observation as prolonged bedrest d/t elevated ACT.     Hospital Course by Diagnosis:  # Coronary Artery Disease- Patient underwent coronary angiogram which demonstrated two vessel disease, however, d/t contrast load already given, didn't want to jeopardize patients kidneys, thus will bring him better in a week for PCI.  - Continue ASA, plavix  - Continue lisinopril   - Begin statin if patient agreeable, but ok to begin after PCI as requested    # DMII-  Please continue PTA diabetes medications.     # PAD - Patient has has scheduled endarterectomy procedure with Dr. Stock on March 13th. Dr. Moore confirmed with Dr. Stock that the procedure was safe to perform on DAPT, so PCI will not preclude the vascular operation from occurring.     Condition on discharge  Temp:  [97.7  F (36.5  C)-98.2  F (36.8  C)] 98.1  F (36.7  C)  Pulse:  [84] 84  Heart Rate:  [64-91] 73  Resp:  [18-20] 18  BP: ()/(49-85) 103/53  SpO2:  [97 %-100 %] 98 %  General: Alert, interactive, NAD  Eyes: sclera anicteric, EOMI  Cardiovascular: regular rate and rhythm, normal S1 and S2, no murmurs, gallops, or rubs  Resp: clear to auscultation bilaterally, no rales, wheezes, or rhonchi  GI: Soft, nontender, nondistended. +BS.  No HSM or masses, no rebound or guarding.  Extremities: No edema, no cyanosis or clubbing, dorsalis pedis and posterior tibialis pulses 2+ bilaterally. RCFA access site CDI without hematoma, ecchymosis or bruit.  Skin: Warm and dry, no jaundice or rash  Neuro: CN 2-12 intact, moves all extremities equally  Psych: Alert & oriented x 3      Medication Changes:  Per below    Discharge medications:   Current Discharge Medication List      CONTINUE these medications which have NOT CHANGED    Details   triamcinolone (KENALOG) 0.1 % cream Apply sparingly to left heel daily.  Qty: 30 g, Refills: 1    Associated Diagnoses:  Dermatitis of left foot      !! Ascorbic Acid (VITAMIN C PO) Take 1,000 mg by mouth      cephALEXin (KEFLEX) 500 MG capsule Take 1 capsule (500 mg) by mouth 2 times daily  Qty: 28 capsule, Refills: 0    Associated Diagnoses: Ulcer of right lower extremity with fat layer exposed (H); Skin ulcer of right foot with fat layer exposed (H); Type II or unspecified type diabetes mellitus with neurological manifestations, not stated as uncontrolled(250.60) (H); Diabetes mellitus with peripheral vascular disease (H)      silver sulfADIAZINE (SILVADENE) 1 % cream Apply topically daily To affected areas on right foot and leg.  Qty: 85 g, Refills: 5    Associated Diagnoses: Ulcer of right lower leg, with fat layer exposed (H); Chronic venous hypertension with ulcer involving right side (H); Type 2 diabetes, controlled, with neuropathy (H)      VITAMIN D, CHOLECALCIFEROL, PO Take 1,000 Units by mouth 2 times daily      ASPIRIN PO Take 81 mg by mouth daily      LISINOPRIL PO Take 20 mg by mouth 2 times daily      insulin glargine (LANTUS SOLOSTAR) 100 UNIT/ML injection Inject 25 Units Subcutaneous At Bedtime  Qty: 15 mL, Refills: 2    Associated Diagnoses: Type 2 diabetes mellitus with diabetic peripheral angiopathy without gangrene, with long-term current use of insulin (H)      Docusate Sodium (COLACE PO) Take 100 mg by mouth daily       nateglinide (STARLIX) 120 MG tablet TAKE 1 TABLET BY MOUTH THREE TIMES DAILY BEFORE MEALS  Qty: 90 tablet, Refills: 11    Associated Diagnoses: Type 2 diabetes, controlled, with neuropathy (H)      !! sitagliptin (JANUVIA) 100 MG tablet Take 1 tablet (100 mg) by mouth daily  Qty: 90 tablet, Refills: 3    Associated Diagnoses: Type 2 diabetes, controlled, with neuropathy (H)      simvastatin (ZOCOR) 10 MG tablet Take 1 tablet (10 mg) by mouth At Bedtime  Qty: 90 tablet, Refills: 3    Associated Diagnoses: Type 2 diabetes, controlled, with neuropathy (H)      gentamicin (GARAMYCIN) 0.1 % cream  Apply topically daily To right leg ulcer.  Qty: 30 g, Refills: 5    Associated Diagnoses: Ulcer of right lower leg, with fat layer exposed (H); Chronic venous hypertension with ulcer involving right side (H); Type 2 diabetes, controlled, with neuropathy (H)      ferrous sulfate (IRON) 325 (65 FE) MG tablet Take 1 tablet (325 mg) by mouth 2 times daily  Qty: 60 tablet, Refills: 11    Associated Diagnoses: Peripheral vascular disease, unspecified      clopidogrel (PLAVIX) 75 MG tablet Take 1 tablet (75 mg) by mouth daily  Qty: 30 tablet, Refills: 11    Associated Diagnoses: Peripheral vascular disease, unspecified      !! ascorbic acid 500 MG TABS Take 1 tablet (500 mg) by mouth 2 times daily  Qty: 30 tablet    Associated Diagnoses: Ulcer of right lower leg, with fat layer exposed (H)      !! sitagliptin (JANUVIA) 100 MG tablet Take 1 tablet (100 mg) by mouth daily  Qty: 90 tablet, Refills: 1    Associated Diagnoses: Type 2 diabetes, HbA1c goal < 7% (H)      ammonium lactate (LAC-HYDRIN) 12 % cream Apply topically 2 times daily as needed for dry skin  Qty: 385 g, Refills: 3    Associated Diagnoses: Venous stasis; Type 2 diabetes, controlled, with neuropathy (H)      insulin pen needle (B-D U/F) 31G X 8 MM Use 1 daily o as directed  Qty: 100 each, Refills: 3    Associated Diagnoses: Diabetes mellitus, type II (H)      !! order for DME Please measure and distribute 1 pair of 30mmHg - 40mmHg knee high open toe ulcercare compression stockings. Jobst ultrasheer or equivalent.  Qty: 2 each, Refills: 6    Associated Diagnoses: Varicose veins of bilateral lower extremities with other complications      blood glucose monitoring (ONE TOUCH ULTRA) test strip Use to test blood sugars 2 times daily or as directed.  Qty: 60 strip, Refills: 11    Associated Diagnoses: Type 2 diabetes mellitus (H)      sildenafil (VIAGRA) 50 MG tablet Take 1 tablet (50 mg) by mouth daily as needed for erectile dysfunction  Qty: 10 tablet, Refills: 11  "   Associated Diagnoses: Vasculogenic erectile dysfunction, unspecified vasculogenic erectile dysfunction type      blood glucose monitoring (FREESTYLE) lancets Use to test blood sugars 2 as directed.  Qty: 3 Box, Refills: 3    Associated Diagnoses: Type 2 diabetes, uncontrolled, with neuropathy (H)      !! order for DME Please measure and distribute 1 pair of 20mmHg - 30mmHg knee high open or closed toe compression stockings. Jobst ultrasheer or equivalent.  Qty: 3 each, Refills: 12    Associated Diagnoses: Varicose veins of both lower extremities with complications      !! order for DME Please measure and distribute 1 pair of 20mm Hg - 30mm Hg knee high ULCER CARE open or closed toe compression stockings.   Patient has a size 13 foot and please take this into consideration.   Jobst or equivalent  Qty: 2 each, Refills: 1    Associated Diagnoses: Varicose veins of lower extremities with other complications; Venous stasis ulcer of right lower extremity (H)      Gauze Pads & Dressings (OPTIFOAM) 6\"X6\" PADS 1 Box once a week  Qty: 1 each, Refills: 6    Associated Diagnoses: Ulcer of right leg, with fat layer exposed (H)       !! - Potential duplicate medications found. Please discuss with provider.          Labs or imaging requiring follow-up after discharge:  Per PCP in one week      Follow-up:  Please follow up with cardiology in one week as arranged.   Please follow up with PCP in one week    LEW Singh, CNP  [facility]  Interventional Cardiology-CSI Service  Pager [phone]       Patient Care Team:  Racheal Swift MD as PCP - General (Internal Medicine)  Brayan Mcclain DPM as Referring Physician (Podiatry)  Miguel Ángel Sampson MD as Resident (Radiology)  Lane Jenkins MD as MD (Radiology)  Anita Stock MD as MD (Vascular Surgery)  Kole Arnold MD as MD (Cardiology)  Kathleen Payne, RN as Registered Nurse (Cardiology)      "

## 2018-03-02 NOTE — PLAN OF CARE
Problem: Patient Care Overview  Goal: Discharge Needs Assessment  Outpatient/Observation goals to be met before discharge home:      AOX4.VSS. Pt denies any pain. Right groin site CDI. Free of hematoma. Right pedal pulse weak at baseline. R foot wrapped in ace bandage. Denies any numbness or tingling. Pt ambulating in room with cane. Voiding spontaneously. Tolerating regular diet. Will continue to monitor.    /53 (BP Location: Right arm)  Pulse 84  Temp 98.1  F (36.7  C) (Oral)  Resp 18  Wt 77.6 kg (171 lb 1.2 oz)  SpO2 98%  BMI 21.67 kg/m2

## 2018-03-02 NOTE — PLAN OF CARE
Problem: Patient Care Overview  Goal: Discharge Needs Assessment  Outpatient/Observation goals to be met before discharge home:    1-Pt will have sheeth removed.- Pt's ACT is still wally at 200's.   Vital stable, wife at bedside. Pt resting . Will recheck in hour.  Nurse to notify provider when observation goals have been met and patient is ready for discharge.
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
## 2018-03-02 NOTE — PLAN OF CARE
Problem: Patient Care Overview  Goal: Plan of Care/Patient Progress Review  Outpatient/Observation goals to be met before discharge home:  -Pt will have sheath removed: Completed  -Stable vitals x 8 hours: currently AVSS. On bedrest until 4:25am

## 2018-03-02 NOTE — DISCHARGE INSTRUCTIONS
Going Home after Coronary Angioplasty or Stent Placement       Name: Amos Walker  Medical Record Number:  [id number]  Today's Date: March 2, 2018        For 24 hours:         Have an adult stay with you for 24 hours.         Relax and take it easy.         Drink plenty of fluids.         You may eat your normal diet, unless your doctor tells you otherwise.         Do NOT make any important or legal decisions.         Do NOT drive or operate machines at home or at work.         Do NOT drink alcohol.      Do NOT smoke.     Medicines:         If you have begun Plavix (clopidogrel), Effient (prasugrel), or Brilinta (ticagrelor), do not stop taking it until you talk to your heart doctor (cardiologist).         If you are on metformin (Glucophage), do not restart it until you have blood tests (within 2 to 3 days after discharge). When your doctor tells you it is safe, you may restart the metformin.         If you have stopped any other medicines, check with your nurse or provider about when to restart them.    Care of groin site:         Remove the Band-Aid after 24 hours. If there is minor oozing, apply another Band-aid and remove it after 12 hours.          Do NOT take a bath, or use a hot tub or pool for at least 3 days. You may shower.          It is normal to have a small bruise or lump at the site.         Do not scrub the site.         Do not use lotion or powder near the puncture site for 3 days.         For the first 2 days: Do not stoop or squat. When you cough, sneeze or move your bowels, hold your hand over the puncture site and press gently.         Do not lift more than 10 pounds for at least 3 to 5 days.         For 2 days, do NOT have sex or do any heavy exercise.     If you start bleeding from the site in your groin:  Lie down flat and press firmly on the site.  Call your physician immediately, or, come to the emergency room.      Call 911 right away if you have bleeding that is heavy or does not  stop.     Call your doctor if:         You have a large or growing hard lump around the site.         The site is red, swollen, hot or tender.         Blood or fluid is draining from the site.         You have chills or a fever greater than 101 F (38 C).         Your leg or arm turns bluish, feels numb or cool.         You have hives, a rash or unusual itching.       ADDITIONAL INSTRUCTIONS: Please follow up with PCP in one week and with cardiology as arranged.     [facility] [place]:   [phone] (7 days a week)      Cardiology Fellow on call (24 hours per day) at [facility]:   [phone] (ask for Cardiology Fellow on call)      Number where we can reach you:  ____________

## 2018-03-02 NOTE — PROGRESS NOTES
Discharge instructions reviewed, understood, and signed by patient. VSS, PIV removed,medications reviewed and understood, patient has all belongings. Patient refused wheelchair and ambulated to [facility]

## 2018-03-02 NOTE — PROVIDER NOTIFICATION
Provider notified regarding the need for a status order, such as outpatient. Provider also asked if patient was going to discharge home this evening, and he will not, patient will stay the night due to a high ACT level and still needing sheath removal and bedrest.

## 2018-03-02 NOTE — PROGRESS NOTES
Pt/wife was educated on bedrest restriction and POC. Groin site sheath/insecure. WNL. Tele tech called pt on hardwire.

## 2018-03-02 NOTE — PROCEDURES
PRELIMINARY CARDIAC CATH REPORT:     PROCEDURES PERFORMED:   1. Selective left and right coronary angiography.  2. Invasive hemodynamic assessment of the LAD and RCA with FFR  3. Femoral angiography.    PHYSICIANS:  1. Attending Interventional Cardiology Staff: Ghanshyam Moore MD  2. Cardiology Fellow: Praveen Drummond MD     INDICATION:  Amos Walker is a 72 year old male with no known coronary artery disease but history of PAD with non-healing left foot ulcer with plans for revascularization. Pre-operative stress MRI was obtained and revealed inferior and inferolateral ischemia. He has risk factors of + age, + HTN, - HLD, + DM, - FHx and + tobacco use. He presents on an elective outpatient basis for coronary angiography.    DESCRIPTION:  1. Consent obtained with discussion of risks.  All questions were answered.  2. Sterile prep and procedure.  3. Location:  right common femoral artery and right common femoral vein  4. Access: Local anesthetic with lidocaine.  A standard (18 g) needle with ultrasound guidance was used to establish vascular access using a modified Seldinger technique.  5. Sheath: 6Fr long (arterial), 4Fr long  (venous)   6. Catheters: 4Fr JL4, 4Fr 3DRC, 6Fr JL4 guide, 6Fr JR4 guide  7. Fluoroscopy time of 18.5 min.  8. Estimated blood loss of <5 mL.  9. See below for procedure details.    MEDICATIONS:  1. Contrast 235 mL IV.  2. Conscious sedation with fentanyl 200 mcg and midazolam 3 mg.  3. Heparin administered to achieve a goal ACT > 250 sec.   4. Adenosine intravenous.    SEDATION START TIME: 1616   SEDATION END TIME: 1800  TOTAL SEDATION TIME: 104 min   Heart rate, BP, respiration, oxygen saturation and patient responses were monitored throughout the procedure with the assistance of the RN under my supervision.    CORONARY ANGIOGRAM:   1. Both coronary arteries arise from their respective cusps.  2. Co-dominant.  3. LM is a large caliber vessel, is moderately calcified and trfurcates into  an LAD, ramus intermedius, and LCx. LM has an eccentric 40% ostial stenosis with focal calcification.  4. LAD is a large caliber vessel, is moderately calcified proximally, supplies the entire apex (type 3), and gives rise to septal perforators, small caliber D1 and small caliber D2.  The pLAD has a long 50-60% stenosis, mLAD has a long 60% stenosis, and the D1 has a 60% stenosis.    5. LCX is a moderately calcified large caliber vessel and gives rise to medium caliber OM1 before continuing as a small AV-groove LCx.  The OM1 has a 40% stenosis.  6. Ramus intermedius is a medium caliber vessel and has a 40% stenosis.  7. RCA is a medium caliber co-dominant vessel and supplies a small PDA and small PL. The mRCA has a long 60-70% stenosis.    FUNCTIONAL ASSESSMENT:  1. LAD/LM  Adequate anticoagulation was was obtained.  A Jus blue wire was advanced distal to the mLAD lesion and the NAVVUS catheter was advanced distal to the lesion. Hyperemia was induced with intravenous adenosine and the FFR was measured.   -LM FFR 0.90  -pLAD FFR 0.82  -mLAD FFR 0.67    2. RCA  Adequate anticoagulation was was obtained.  A Jus blue wire was advanced distal to the mRCA lesion and the NAVVUS catheter was advanced distal to the lesion. Hyperemia was induced with intravenous adenosine and the FFR was measured.   -mRCA FFR 0.79    FEMORAL ANGIOGRAPHY  1. The right external iliac and common femoral arteries showed significant peripheral artery disease with severe calcification.  Access site was confirmed in the common femoral artery. Given severe calcification, arteriotomy closure was not performed.    COMPLICATIONS:  1. The patient was found to have an infiltrated IV in the left antecubital fossa. Unfortunately this was discovered after 13,000 units of heparin in addition to adenosine was infused. The ACT was found to be very low (140 -> 103 s) confirming that a considerable volume of heparin went into the subcutaneous space. The IV was  removed and pressure held with complete resolution of swelling. Patient denied pain. At this point there was no IV access so a 4Fr long sheath was placed in the right femoral vein to allow for intravenous adenosine administration for repeat FFR.    SUMMARY:   1. Abnormal stress test secondary to obstructive coronary artery disease.  2. Two vessel coronary artery disease with left main involvement. This includes 40% ostial left main stenosis, a long 50-60% proximal LAD stenosis, long 60% mLAD stenosis (FFR 0.67), and mRCA stenosis (FFR 0.79). Though the left main FFR is 0.90, this will require stenting from the ostial LM through the mLAD in addition to stenting of the mRCA.  3. Angiographically significant peripheral arterial disease with severe calcification.  4. Sheaths were secured in place.    PLAN:   1. Will plan to perform PCI of the left main through the mid LAD as well as of the mRCA. Given considerable contrast load (235 mL) will bring the patient back next week to stage this intervention.  2. Continue aspirin 81 mg po daily lifelong.  3. Continue clopidogrel 75 mg po daily.  4. Bedrest per protocol.  5. Admit to observation.  6. Continued medical management and lifestyle modification for cardiovascular risk factor optimization.     The attending interventional cardiologist was present for the entire procedure.    Findings discussed with the primary outpatient cardiology attending Dr. Chinchilla as well as referring vascular surgeon Dr. Stock.    See CVIS report for final draft.      Praveen Drummond MD  Cardiovascular Disease Fellow  [phone]

## 2018-03-02 NOTE — PLAN OF CARE
Problem: Patient Care Overview  Goal: Plan of Care/Patient Progress Review  Outpatient/Observation goals to be met before discharge home:  -Pt will have sheath removed: Completed  -Stable vitals x 8 hours: currently AVSS.  Bedrest completed at 4:25am. R groin site soft and flat, bleeding free. CMS intact in RLE, weak pedal pulse per baseline, Hx of  non-healing R foot ulcer with  plans for revascularization. AVSS, denies pain. Pt voiding independently, and tolerating reg diet. SBA with ambulation with cane. On tele, S rhythm, HR ~75. D/c today per team notes. Will continue to monitor.  BP 99/53 (BP Location: Right arm)  Pulse 84  Temp 97.8  F (36.6  C) (Oral)  Resp 18  Wt 77.6 kg (171 lb 1.2 oz)  SpO2 100%  BMI 21.67 kg/m2

## 2018-03-02 NOTE — PROGRESS NOTES
Manual pressure held for 20 minutes to right groin site.  Sheath, size 6fr arterial,4fr vein,  pulled by Lisa.  Site CDI, no hematoma.  Stasis achieved at 0025. Off bedrest @ . 0425

## 2018-03-05 ENCOUNTER — TELEPHONE (OUTPATIENT)
Dept: CARDIOLOGY | Facility: CLINIC | Age: 73
End: 2018-03-05

## 2018-03-05 ENCOUNTER — OFFICE VISIT (OUTPATIENT)
Dept: INTERNAL MEDICINE | Facility: CLINIC | Age: 73
End: 2018-03-05
Payer: MEDICARE

## 2018-03-05 VITALS
HEART RATE: 99 BPM | WEIGHT: 171 LBS | SYSTOLIC BLOOD PRESSURE: 125 MMHG | RESPIRATION RATE: 20 BRPM | DIASTOLIC BLOOD PRESSURE: 67 MMHG | BODY MASS INDEX: 21.66 KG/M2 | OXYGEN SATURATION: 99 %

## 2018-03-05 DIAGNOSIS — E11.40 TYPE 2 DIABETES, CONTROLLED, WITH NEUROPATHY (H): ICD-10-CM

## 2018-03-05 DIAGNOSIS — I25.83 CORONARY ARTERY DISEASE DUE TO LIPID RICH PLAQUE: Primary | ICD-10-CM

## 2018-03-05 DIAGNOSIS — D64.9 ANEMIA, UNSPECIFIED TYPE: ICD-10-CM

## 2018-03-05 DIAGNOSIS — Z12.11 SCREEN FOR COLON CANCER: ICD-10-CM

## 2018-03-05 DIAGNOSIS — I25.10 CORONARY ARTERY DISEASE DUE TO LIPID RICH PLAQUE: Primary | ICD-10-CM

## 2018-03-05 DIAGNOSIS — Z12.11 SCREEN FOR COLON CANCER: Primary | ICD-10-CM

## 2018-03-05 LAB
BASOPHILS # BLD AUTO: 0.1 10E9/L (ref 0–0.2)
BASOPHILS NFR BLD AUTO: 0.7 %
CREAT SERPL-MCNC: 1.21 MG/DL (ref 0.66–1.25)
CREAT UR-MCNC: 97 MG/DL
DIFFERENTIAL METHOD BLD: ABNORMAL
EOSINOPHIL # BLD AUTO: 0.1 10E9/L (ref 0–0.7)
EOSINOPHIL NFR BLD AUTO: 0.7 %
ERYTHROCYTE [DISTWIDTH] IN BLOOD BY AUTOMATED COUNT: 13.4 % (ref 10–15)
FERRITIN SERPL-MCNC: 297 NG/ML (ref 26–388)
GFR SERPL CREATININE-BSD FRML MDRD: 59 ML/MIN/1.7M2
HCT VFR BLD AUTO: 28.4 % (ref 40–53)
HGB BLD-MCNC: 9.1 G/DL (ref 13.3–17.7)
IMM GRANULOCYTES # BLD: 0.1 10E9/L (ref 0–0.4)
IMM GRANULOCYTES NFR BLD: 1.5 %
IRON SATN MFR SERPL: 22 % (ref 15–46)
IRON SERPL-MCNC: 47 UG/DL (ref 35–180)
LDH SERPL L TO P-CCNC: 147 U/L (ref 85–227)
LYMPHOCYTES # BLD AUTO: 0.7 10E9/L (ref 0.8–5.3)
LYMPHOCYTES NFR BLD AUTO: 10.2 %
MCH RBC QN AUTO: 30.3 PG (ref 26.5–33)
MCHC RBC AUTO-ENTMCNC: 32 G/DL (ref 31.5–36.5)
MCV RBC AUTO: 95 FL (ref 78–100)
MICROALBUMIN UR-MCNC: 24 MG/L
MICROALBUMIN/CREAT UR: 24.51 MG/G CR (ref 0–17)
MONOCYTES # BLD AUTO: 0.5 10E9/L (ref 0–1.3)
MONOCYTES NFR BLD AUTO: 6.6 %
NEUTROPHILS # BLD AUTO: 5.8 10E9/L (ref 1.6–8.3)
NEUTROPHILS NFR BLD AUTO: 80.3 %
NRBC # BLD AUTO: 0 10*3/UL
NRBC BLD AUTO-RTO: 0 /100
PLATELET # BLD AUTO: 257 10E9/L (ref 150–450)
RBC # BLD AUTO: 3 10E12/L (ref 4.4–5.9)
RETICS # AUTO: 52.5 10E9/L (ref 25–95)
RETICS/RBC NFR AUTO: 1.8 % (ref 0.5–2)
TIBC SERPL-MCNC: 213 UG/DL (ref 240–430)
TSH SERPL DL<=0.005 MIU/L-ACNC: 2.24 MU/L (ref 0.4–4)
VIT B12 SERPL-MCNC: 328 PG/ML (ref 193–986)
WBC # BLD AUTO: 7.2 10E9/L (ref 4–11)

## 2018-03-05 PROCEDURE — 40000611 ZZHCL STATISTIC MORPHOLOGY W/INTERP HEMEPATH TC 85060: Performed by: INTERNAL MEDICINE

## 2018-03-05 PROCEDURE — 82274 ASSAY TEST FOR BLOOD FECAL: CPT | Performed by: INTERNAL MEDICINE

## 2018-03-05 PROCEDURE — 83010 ASSAY OF HAPTOGLOBIN QUANT: CPT | Performed by: INTERNAL MEDICINE

## 2018-03-05 ASSESSMENT — PAIN SCALES - GENERAL: PAINLEVEL: NO PAIN (0)

## 2018-03-05 NOTE — NURSING NOTE
Chief Complaint   Patient presents with     Results     discuss low hemoglobin     Heart Problem     discuss heart stent placement this 3/8/18   Irasema Fernandez LPN 11:16 AM on 3/5/2018    Rooming Note  Health Maintenance   Health Maintenance Due   Topic Date Due     COLON CANCER SCREEN (SYSTEM ASSIGNED)  04/24/1997     ADVANCE DIRECTIVE PLANNING Q5 YRS  04/24/2002     LUNG CANCER SCREENING ANNUAL  12/05/2017     FOOT EXAM Q1 YEAR  02/06/2018     MICROALBUMIN Q1 YEAR  02/06/2018     TSH W/ FREE T4 REFLEX Q2 YEAR  03/21/2018    All health maintenance items discussed and pended.  Irasema Fernandez LPN 11:18 AM on 3/5/2018

## 2018-03-05 NOTE — PROGRESS NOTES
"Mr. Walker is a 72 year old male here for follow up of hemoglobin level.     History of Present Illness:    Incidental finding of Hg 8.6 on 2/27/18, decreased from 11.5 on 12/19/17, most recently 8.0 on 3/2/18. On iron therapy with good compliance. Continues on plavix and aspirin. No known source of bleeding. Reports history of anemia 2-3 years ago without identified source. Denies dizziness, syncope. Feels tired at baseline, not worsening. Participating in usual activities without percieved deficit. However, feels like he has been moving in slow motion since hip repair due to fear of falling. Endorses some shortness of breath, nothing that worries him. Feels heart is working harder than it should in that heart feels \"heavy\", no pain or palpitations.    Told heart function is low-normal by cardiologist. No history of heart attack. Angiogram last week revealed two vessel disease, did not place stents because of risk associated with contrast load. Thinks he is supposed to go back Thursday for stent placement. Would like us to check his procedure site.  Has concerns about timing of upcoming procedures and whether they are too close together.     Recently lost several pounds. Reports difficulty keeping weight on in the past. Did not get weighed today so is not sure how much weight he has lost. Weighed 176lb at last visit in December. Pants fitting more loosely. No change in stools. History of chronic constipation, has been pretty regular lately but stools are small, hard vanessa. Takes effort to have BM. No blood in stool or black stools. No abdominal pain, nausea, vomiting. Due for colonoscopy. Reports having had 2-3 colonoscopies in the past.    Reports ankle swelling at baseline. Does not wear compression stocking. No issues with urination. Feels cold at baseline. Occasional dry skin, worse in winter, lotion resolves. No changes to skin or hair.    Has been eating less meat for Lent, supplementing with dairy products " "and beans.    Taking cephalexin for wounds since 2/20. Wonders if it may be affecting his Hg.    A full 10-pt Review of Systems was performed, verified and is negative except as documented in the HPI.  All health questionnaires were reviewed, verified and relevant information documented above.      Past Medical History:  Past Medical History:   Diagnosis Date     Anemia      CKD (chronic kidney disease) stage 3, GFR 30-59 ml/min      HTN (hypertension)      Hyperlipidemia      MRSA cellulitis of right foot     in past.      PAD (peripheral artery disease) (H)     s/p stenting in R leg     Tobacco use     50+ pack     Type 2 diabetes mellitus (H)     for 25 yrs.  on insulin and starlix     Venous ulcer        Active Meds:  Current Outpatient Prescriptions   Medication     sitagliptin (JANUVIA) 100 MG tablet     triamcinolone (KENALOG) 0.1 % cream     Ascorbic Acid (VITAMIN C PO)     cephALEXin (KEFLEX) 500 MG capsule     silver sulfADIAZINE (SILVADENE) 1 % cream     VITAMIN D, CHOLECALCIFEROL, PO     ASPIRIN PO     LISINOPRIL PO     insulin glargine (LANTUS SOLOSTAR) 100 UNIT/ML injection     Docusate Sodium (COLACE PO)     nateglinide (STARLIX) 120 MG tablet     sitagliptin (JANUVIA) 100 MG tablet     simvastatin (ZOCOR) 10 MG tablet     ammonium lactate (LAC-HYDRIN) 12 % cream     gentamicin (GARAMYCIN) 0.1 % cream     ferrous sulfate (IRON) 325 (65 FE) MG tablet     clopidogrel (PLAVIX) 75 MG tablet     ascorbic acid 500 MG TABS     insulin pen needle (B-D U/F) 31G X 8 MM     order for DME     blood glucose monitoring (ONE TOUCH ULTRA) test strip     sildenafil (VIAGRA) 50 MG tablet     blood glucose monitoring (FREESTYLE) lancets     order for DME     order for DME     Gauze Pads & Dressings (OPTIFOAM) 6\"X6\" PADS     No current facility-administered medications for this visit.         Allergies:  Reviewed, refer to EMR    Relevant Social History:  Lives in a nursing home with his wife.    Physical Exam:  Vitals: " /67 (BP Location: Right arm, Patient Position: Sitting, Cuff Size: Adult Regular)  Pulse 99  Resp 20  Wt 77.6 kg (171 lb)  SpO2 99%  BMI 21.66 kg/m2   Vitals reviewed.  Constitutional: Alert, oriented, pleasant, no acute distress  Head: Normocephalic, atraumatic  Eyes: Extra-ocular movements grossly intact, pupils equally round and reactive bilaterally, no scleral icterus  ENT: Oropharynx clear, moist mucus membranes  Neck: Supple, no lymphadenopathy  Cardiovascular: Regular rate and rhythm, no murmurs, rubs or gallops, no R femoral bruit  Respiratory: Good air movement bilaterally, lungs clear, no wheezes/rales/rhonchi  Musculoskeletal: No peripheral edema  Neurologic: Alert and oriented, cranial nerves 2-12 intact  Skin: No rashes/lesions. No hematoma or signs of infection at R femoral incision site.  Psychiatric: Normal mentation, affect and mood    Recent Labs:  Lab Results   Component Value Date     03/02/2018      Lab Results   Component Value Date    POTASSIUM 4.7 03/02/2018     Lab Results   Component Value Date    CHLORIDE 104 03/02/2018     Lab Results   Component Value Date    CLAUDIA 7.9 03/02/2018     Lab Results   Component Value Date    CO2 24 03/02/2018     Lab Results   Component Value Date    BUN 26 03/02/2018     Lab Results   Component Value Date    CR 1.20 03/02/2018     Lab Results   Component Value Date     03/02/2018     Lab Results   Component Value Date    ALT 14 10/31/2016     Lab Results   Component Value Date    WBC 4.6 03/02/2018     Lab Results   Component Value Date    HGB 8.0 03/02/2018     Lab Results   Component Value Date    HCT 24.9 03/02/2018     Lab Results   Component Value Date    MCV 93 03/02/2018     Lab Results   Component Value Date     03/02/2018         Assessment and Plan:    72 year old male with a history of tobacco use, diabetes mellitus, HTN, CKD, PAD, and chronic leg ulcers presents for follow up of normocytic  anemia.    #Anemia  Differential includes malignancy, anemia of chronic kidney disease, myelodysplastic syndrome, hypothyroidism, and iatrogenic anemia 2/2 medications and/or procedures. Will pursue workup as below.  Orders Placed This Encounter   Procedures     Albumin Random Urine Quantitative with Creat Ratio     TSH WITH FREE T4 REFLEX - (Today)     Fecal colorectal cancer screen FIT - Future (S+30)     Blood Morphology Pathologist Review     CBC with platelets differential     Reticulocyte Count     Haptoglobin     Lactate Dehydrogenase     Iron and iron binding capacity     Ferritin     Vitamin B12     Creatinine       #Routine Health Maintenence:  Immunizations (zoster, pneumovax, flu, Tdap, Hep A/B):   Most Recent Immunizations   Administered Date(s) Administered     Influenza (High Dose) 3 valent vaccine 10/10/2017     Influenza (IIV3) PF 11/01/2013     Pneumo Conj 13-V (2010&after) 11/03/2014     Pneumococcal 23 valent 12/21/2015     TDAP Vaccine (Boostrix) 03/21/2016     Lipids:   Recent Labs   Lab Test  10/25/17   1309  10/31/16   1205   11/18/14   0853   CHOL  92  123   < >  110   HDL  41  41   < >  45   LDL  30  67   < >  45   TRIG  100  74   < >  100   CHOLHDLRATIO   --    --    --   2.4    < > = values in this interval not displayed.     PSA (50-75 yrs): No results found for: PSA   AAA Screening (65-75 yrs): neg 12/16  Lung Ca Screening (>30 py 55-79 or >20 py 50-79 + RF): 12/16, rec annual screening  Colonoscopy (50-75 yrs): due  Dexa (>65W or 70M yrs): consider future  Safety/Lifestyle: not discussed  Tob/EtOH: not discussed  Depression: not discussed  Advanced Directive: not discussed    Return to clinic: 2 weeks    Allie Cantu  MS3  P:[phone]    I, Allie Cantu, MS3, am acting as the scribe for Racheal Swift MD. All aspects of the exam and documentation were approved by the attending physician.    Attending Addendum:  The medical student acted as scribe.  The patient was seen  and examined with medical student.  The history and physical were independently verified by myself.  The above documentation represents our joint assessment and plan.  Racheal Swift MD  Internal Medicine

## 2018-03-05 NOTE — TELEPHONE ENCOUNTER
Per Dr. Moore, pt needs PCI of LM, LAD, and RCA prior to Vascular procedure on 3/13/18. Writer spoke with pt today. Planned PCI scheduled for 3/8/18, checking in at 0830 am. Pre-procedure instructions reviewed. Pt verbalized understanding.

## 2018-03-05 NOTE — PATIENT INSTRUCTIONS
[facility]: [phone]     [facility] Medication Refill Request Information:  * Please contact your pharmacy regarding ANY request for medication refills.  ** [facility] Prescription Fax = [phone]  * Please allow 3 business days for routine medication refills.  * Please allow 5 business days for controlled substance medication refills.     [facility] Test Result notification information:  *You will be notified with in 7-10 days of your appointment day regarding the results of your test.  If you are on MyChart you will be notified as soon as the provider has reviewed the results and signed off on them.      Cardi[facility] [phone] [place] [place]

## 2018-03-06 ENCOUNTER — MYC MEDICAL ADVICE (OUTPATIENT)
Dept: ORTHOPEDICS | Facility: CLINIC | Age: 73
End: 2018-03-06

## 2018-03-06 ENCOUNTER — OFFICE VISIT (OUTPATIENT)
Dept: PODIATRY | Facility: CLINIC | Age: 73
End: 2018-03-06
Payer: MEDICARE

## 2018-03-06 VITALS — DIASTOLIC BLOOD PRESSURE: 53 MMHG | SYSTOLIC BLOOD PRESSURE: 114 MMHG | OXYGEN SATURATION: 98 % | HEART RATE: 94 BPM

## 2018-03-06 DIAGNOSIS — L97.912 ULCER OF RIGHT LOWER EXTREMITY WITH FAT LAYER EXPOSED (H): Primary | ICD-10-CM

## 2018-03-06 DIAGNOSIS — E11.51 DIABETES MELLITUS WITH PERIPHERAL VASCULAR DISEASE (H): ICD-10-CM

## 2018-03-06 DIAGNOSIS — E11.49 TYPE II OR UNSPECIFIED TYPE DIABETES MELLITUS WITH NEUROLOGICAL MANIFESTATIONS, NOT STATED AS UNCONTROLLED(250.60) (H): ICD-10-CM

## 2018-03-06 LAB
COPATH REPORT: NORMAL
HAPTOGLOB SERPL-MCNC: 256 MG/DL (ref 35–175)
HEMOCCULT STL QL IA: POSITIVE

## 2018-03-06 PROCEDURE — 99213 OFFICE O/P EST LOW 20 MIN: CPT | Performed by: PODIATRIST
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
## 2018-03-06 NOTE — MR AVS SNAPSHOT
After Visit Summary   3/6/2018    Amos Walker    MRN: [id number]           Patient Information     Date Of Birth          1945        Visit Information        Provider Department      3/6/2018 10:00 AM Brayan Mcclain DPM [facility]        Today's Diagnoses     Ulcer of right lower extremity with fat layer exposed (H)    -  1    Type II or unspecified type diabetes mellitus with neurological manifestations, not stated as uncontrolled(250.60) (H)        Diabetes mellitus with peripheral vascular disease (H)          Care Instructions    Thanks for coming today.  [facility]  [address] [place] [address]    To schedule future appointments in Ortho Clinic, you may call [phone].    To schedule ordered imaging by your Provider: Call [facility] at [phone]    [place] available online at:   [email or web address]    Please call if any further questions or concerns [phone] and ask for the Orthopedic Department. Clinic hours 8 am to 5 pm.    Return to clinic if symptoms worsen.            Follow-ups after your visit        Your next 10 appointments already scheduled     Mar 08, 2018  8:30 AM CST   Procedure 1.5 hr with U2A ROOM 2   [address] [facility] ([facility])    [address] [facility] [address]               Mar 08, 2018 10:00 AM CST   Cath 90 Minute with [id number]   [facility]    [address] [address] [phone]            Mar 13, 2018  9:30 AM CDT   Return Visit with Brayan Mcclain DPM   [facility]    [address] [place] [address]   [phone]            Mar 13, 2018   Procedure with Anita Stock MD   [facility] [place] Same Day [facility] (--)    [address] [address] [phone]            Mar  19, 2018 11:00 AM CDT   (Arrive by 10:45 AM)   Return Visit with Racheal Swift MD   [facility] ([facility])    [address] [facility] [address]   [phone]            Mar 20, 2018 10:00 AM CDT   Return Visit with Brayan Mcclain DPM   [facility] ([facility])    [address] [facility] [address]   [phone]            Mar 27, 2018  9:30 AM CDT   Return Visit with Brayan Mcclain DPM   [facility] ([facility])    [address] [facility] [address]   [phone]            Apr 27, 2018 10:25 AM CDT   (Arrive by 10:10 AM)   Return Visit with Racheal Swift MD   [facility] ([facility])    [address] [facility] [address]   [phone]            Jun 20, 2018 10:30 AM CDT   RETURN RETINA with Jen Aranda MD   [facility] ([facility])    [address]  [address] [address] [place] [address]   [phone]              Future tests that were ordered for you today     Open Future Orders        Priority Expected Expires Ordered    Coronary Angiography Adult Order Routine  3/5/2019 3/5/2018    Fecal colorectal cancer screen FIT - Future (S+30) Routine 3/26/2018 4/4/2018 3/5/2018            Who to contact     If you have questions or need follow up information about today's clinic visit or your schedule please contact [facility] directly at [phone].  Normal or non-critical lab and imaging results will be communicated to you by MyChart, letter or phone within 4 business days after the clinic has received the results. If you do not hear from us within 7 days, please contact the clinic through MyChart or phone. If you have a critical or abnormal lab result, we will notify you by phone as soon as  possible.  Submit refill requests through [employer] or call your pharmacy and they will forward the refill request to us. Please allow 3 business days for your refill to be completed.          Additional Information About Your Visit        BaseTrace Information     BaseTrace gives you secure access to your electronic health record. If you see a primary care provider, you can also send messages to your care team and make appointments. If you have questions, please call your primary care clinic.  If you do not have a primary care provider, please call [phone] and they will assist you.      BaseTrace is an electronic gateway that provides easy, online access to your medical records. With Base[employer], you can request a clinic appointment, read your test results, renew a prescription or communicate with your care team.     To access your existing account, please contact your [facility] or call [phone] for assistance.        Care EveryWhere ID     This is your Care EveryWhere ID. This could be used by other organizations to access your [facility] medical records  [id number]        Your Vitals Were     Pulse Pulse Oximetry                94 98%           Blood Pressure from Last 3 Encounters:   03/06/18 114/53   03/05/18 125/67   03/02/18 103/53    Weight from Last 3 Encounters:   03/05/18 77.6 kg (171 lb)   03/01/18 77.6 kg (171 lb 1.2 oz)   02/21/18 77.6 kg (171 lb 2 oz)              Today, you had the following     No orders found for display         Today's Medication Changes          These changes are accurate as of 3/6/18 10:16 AM.  If you have any questions, ask your nurse or doctor.               These medicines have changed or have updated prescriptions.        Dose/Directions    clopidogrel 75 MG tablet   Commonly known as:  PLAVIX   This may have changed:  when to take this   Used for:  Peripheral vascular disease, unspecified        Dose:  75 mg   Take 1 tablet (75 mg) by mouth  daily   Quantity:  30 tablet   Refills:  11       gentamicin 0.1 % cream   Commonly known as:  GARAMYCIN   This may have changed:    - when to take this  - reasons to take this  - additional instructions   Used for:  Ulcer of right lower leg, with fat layer exposed (H), Chronic venous hypertension with ulcer involving right side (H), Type 2 diabetes, controlled, with neuropathy (H)        Apply topically daily To right leg ulcer.   Quantity:  30 g   Refills:  5       insulin glargine 100 UNIT/ML injection   Commonly known as:  LANTUS SOLOSTAR   This may have changed:  when to take this   Used for:  Type 2 diabetes mellitus with diabetic peripheral angiopathy without gangrene, with long-term current use of insulin (H)        Dose:  25 Units   Inject 25 Units Subcutaneous At Bedtime   Quantity:  15 mL   Refills:  2                Primary Care Provider Office Phone # Fax #    Racheal Swift [phone]       [address] [address]        Equal Access to Services     SAMSON MELTON : Hadii niurka webb hadasho Soomaali, waaxda luqadaha, qaybta kaalmada adeegyada, yolanda lopez. So [facility] [phone].    ATENCIÓN: Si habla español, tiene a rodrigues disposición servicios gratuitos de asistencia lingüística. Llame al [phone].    We comply with applicable federal civil rights laws and [place] laws. We do not discriminate on the basis of race, color, national origin, age, disability, sex, sexual orientation, or gender identity.            Thank you!     Thank you for choosing [facility]  for your care. Our goal is always to provide you with excellent care. Hearing back from our patients is one way we can continue to improve our services. Please take a few minutes to complete the written survey that you may receive in the mail after your visit with us. Thank you!             Your Updated Medication List - Protect others around you: Learn how  to safely use, store and throw away your medicines at www.disposemymeds.org.          This list is accurate as of 3/6/18 10:16 AM.  Always use your most recent med list.                   Brand Name Dispense Instructions for use Diagnosis    ammonium lactate 12 % cream    LAC-HYDRIN    385 g    Apply topically 2 times daily as needed for dry skin    Venous stasis, Type 2 diabetes, controlled, with neuropathy (H)       ascorbic acid 500 MG Tabs     30 tablet    Take 1 tablet (500 mg) by mouth 2 times daily    Ulcer of right lower leg, with fat layer exposed (H)       ASPIRIN PO      Take 81 mg by mouth daily        blood glucose monitoring lancets     3 Box    Use to test blood sugars 2 as directed.    Type 2 diabetes, uncontrolled, with neuropathy (H)       blood glucose monitoring test strip    ONETOUCH ULTRA    60 strip    Use to test blood sugars 2 times daily or as directed.    Type 2 diabetes mellitus (H)       cephALEXin 500 MG capsule    KEFLEX    28 capsule    Take 1 capsule (500 mg) by mouth 2 times daily    Ulcer of right lower extremity with fat layer exposed (H), Skin ulcer of right foot with fat layer exposed (H), Type II or unspecified type diabetes mellitus with neurological manifestations, not stated as uncontrolled(250.60) (H), Diabetes mellitus with peripheral vascular disease (H)       clopidogrel 75 MG tablet    PLAVIX    30 tablet    Take 1 tablet (75 mg) by mouth daily    Peripheral vascular disease, unspecified       COLACE PO      Take 100 mg by mouth daily        ferrous sulfate 325 (65 FE) MG tablet    IRON    60 tablet    Take 1 tablet (325 mg) by mouth 2 times daily    Peripheral vascular disease, unspecified       gentamicin 0.1 % cream    GARAMYCIN    30 g    Apply topically daily To right leg ulcer.    Ulcer of right lower leg, with fat layer exposed (H), Chronic venous hypertension with ulcer involving right side (H), Type 2 diabetes, controlled, with neuropathy (H)       insulin  "glargine 100 UNIT/ML injection    LANTUS SOLOSTAR    15 mL    Inject 25 Units Subcutaneous At Bedtime    Type 2 diabetes mellitus with diabetic peripheral angiopathy without gangrene, with long-term current use of insulin (H)       insulin pen needle 31G X 8 MM    B-D U/F    100 each    Use 1 daily o as directed    Diabetes mellitus, type II (H)       LISINOPRIL PO      Take 20 mg by mouth 2 times daily        nateglinide 120 MG tablet    STARLIX    90 tablet    TAKE 1 TABLET BY MOUTH THREE TIMES DAILY BEFORE MEALS    Type 2 diabetes, controlled, with neuropathy (H)       OPTIFOAM 6\"X6\" Pads     1 each    1 Box once a week    Ulcer of right leg, with fat layer exposed (H)       * order for DME     2 each    Please measure and distribute 1 pair of 20mm Hg - 30mm Hg knee high ULCER CARE open or closed toe compression stockings.  Patient has a size 13 foot and please take this into consideration.  Jobst or equivalent    Varicose veins of lower extremities with other complications, Venous stasis ulcer of right lower extremity (H)       * order for DME     3 each    Please measure and distribute 1 pair of 20mmHg - 30mmHg knee high open or closed toe compression stockings. Jobst ultrasheer or equivalent.    Varicose veins of both lower extremities with complications       * order for DME     2 each    Please measure and distribute 1 pair of 30mmHg - 40mmHg knee high open toe ulcercare compression stockings. Jobst ultrasheer or equivalent.    Varicose veins of bilateral lower extremities with other complications       sildenafil 50 MG tablet    VIAGRA    10 tablet    Take 1 tablet (50 mg) by mouth daily as needed for erectile dysfunction    Vasculogenic erectile dysfunction, unspecified vasculogenic erectile dysfunction type       silver sulfADIAZINE 1 % cream    SILVADENE    85 g    Apply topically daily To affected areas on right foot and leg.    Ulcer of right lower leg, with fat layer exposed (H), Chronic venous " hypertension with ulcer involving right side (H), Type 2 diabetes, controlled, with neuropathy (H)       simvastatin 10 MG tablet    ZOCOR    90 tablet    Take 1 tablet (10 mg) by mouth At Bedtime    Type 2 diabetes, controlled, with neuropathy (H)       * sitagliptin 100 MG tablet    JANUVIA    90 tablet    Take 1 tablet (100 mg) by mouth daily    Type 2 diabetes, controlled, with neuropathy (H)       * sitagliptin 100 MG tablet    JANUVIA    90 tablet    Take 1 tablet (100 mg) by mouth daily    Type 2 diabetes, HbA1c goal < 7% (H)       triamcinolone 0.1 % cream    KENALOG    30 g    Apply sparingly to left heel daily.    Dermatitis of left foot       VITAMIN C PO      Take 1,000 mg by mouth        VITAMIN D (CHOLECALCIFEROL) PO      Take 1,000 Units by mouth 2 times daily        * Notice:  This list has 5 medication(s) that are the same as other medications prescribed for you. Read the directions carefully, and ask your doctor or other care provider to review them with you.

## 2018-03-06 NOTE — PATIENT INSTRUCTIONS
Thanks for coming today.  [facility] Medicine Clinic  [address] [place] [address]    To schedule future appointments in Ortho Clinic, you may call [phone].    To schedule ordered imaging by your Provider: Call [facility] at [phone]    [email or web address] available online at:   [email or web address]    Please call if any further questions or concerns [phone] and ask for the Orthopedic Department. Clinic hours 8 am to 5 pm.    Return to clinic if symptoms worsen.

## 2018-03-06 NOTE — NURSING NOTE
"Amos Walker's goals for this visit include: Bilateral leg wound check   He requests these members of his care team be copied on today's visit information: yes    PCP: Racheal Swift    Referring Provider:  Referred Self, MD  No address on file    Chief Complaint   Patient presents with     RECHECK     Bilateral leg check       Initial /53  Pulse 94  SpO2 98% Estimated body mass index is 21.66 kg/(m^2) as calculated from the following:    Height as of 1/22/18: 1.892 m (6' 2.5\").    Weight as of 3/5/18: 77.6 kg (171 lb).  Medication Reconciliation: complete    "

## 2018-03-08 ENCOUNTER — APPOINTMENT (OUTPATIENT)
Dept: MEDSURG UNIT | Facility: CLINIC | Age: 73
End: 2018-03-08
Attending: INTERNAL MEDICINE
Payer: MEDICARE

## 2018-03-08 ENCOUNTER — HOSPITAL ENCOUNTER (OUTPATIENT)
Facility: CLINIC | Age: 73
Discharge: HOME | End: 2018-03-08
Attending: INTERNAL MEDICINE | Admitting: INTERNAL MEDICINE
Payer: MEDICARE

## 2018-03-08 ENCOUNTER — APPOINTMENT (OUTPATIENT)
Dept: CARDIOLOGY | Facility: CLINIC | Age: 73
End: 2018-03-08
Attending: INTERNAL MEDICINE
Payer: MEDICARE

## 2018-03-08 VITALS
WEIGHT: 171 LBS | BODY MASS INDEX: 21.94 KG/M2 | SYSTOLIC BLOOD PRESSURE: 115 MMHG | HEIGHT: 74 IN | HEART RATE: 78 BPM | OXYGEN SATURATION: 96 % | RESPIRATION RATE: 18 BRPM | DIASTOLIC BLOOD PRESSURE: 52 MMHG | TEMPERATURE: 98.4 F

## 2018-03-08 DIAGNOSIS — I25.83 CORONARY ARTERY DISEASE DUE TO LIPID RICH PLAQUE: ICD-10-CM

## 2018-03-08 DIAGNOSIS — I25.10 CORONARY ARTERY DISEASE DUE TO LIPID RICH PLAQUE: ICD-10-CM

## 2018-03-08 PROBLEM — Z98.890 STATUS POST CORONARY ANGIOGRAM: Status: ACTIVE | Noted: 2018-03-08

## 2018-03-08 LAB
ANION GAP SERPL CALCULATED.3IONS-SCNC: 8 MMOL/L (ref 3–14)
BUN SERPL-MCNC: 26 MG/DL (ref 7–30)
CALCIUM SERPL-MCNC: 7.8 MG/DL (ref 8.5–10.1)
CHLORIDE SERPL-SCNC: 104 MMOL/L (ref 94–109)
CO2 SERPL-SCNC: 22 MMOL/L (ref 20–32)
CREAT SERPL-MCNC: 1.1 MG/DL (ref 0.66–1.25)
ERYTHROCYTE [DISTWIDTH] IN BLOOD BY AUTOMATED COUNT: 14.5 % (ref 10–15)
GFR SERPL CREATININE-BSD FRML MDRD: 66 ML/MIN/1.7M2
GLUCOSE BLDC GLUCOMTR-MCNC: 138 MG/DL (ref 70–99)
GLUCOSE BLDC GLUCOMTR-MCNC: 80 MG/DL (ref 70–99)
GLUCOSE SERPL-MCNC: 124 MG/DL (ref 70–99)
HCT VFR BLD AUTO: 28.3 % (ref 40–53)
HGB BLD-MCNC: 8.9 G/DL (ref 13.3–17.7)
KCT BLD-ACNC: 172 SEC (ref 75–150)
KCT BLD-ACNC: 200 SEC (ref 75–150)
KCT BLD-ACNC: 241 SEC (ref 75–150)
KCT BLD-ACNC: 249 SEC (ref 75–150)
KCT BLD-ACNC: 294 SEC (ref 75–150)
KCT BLD-ACNC: 298 SEC (ref 75–150)
MCH RBC QN AUTO: 29.9 PG (ref 26.5–33)
MCHC RBC AUTO-ENTMCNC: 31.4 G/DL (ref 31.5–36.5)
MCV RBC AUTO: 95 FL (ref 78–100)
PLATELET # BLD AUTO: 235 10E9/L (ref 150–450)
POTASSIUM SERPL-SCNC: 5.1 MMOL/L (ref 3.4–5.3)
RBC # BLD AUTO: 2.98 10E12/L (ref 4.4–5.9)
SODIUM SERPL-SCNC: 134 MMOL/L (ref 133–144)
WBC # BLD AUTO: 5.2 10E9/L (ref 4–11)

## 2018-03-08 PROCEDURE — C1894 INTRO/SHEATH, NON-LASER: HCPCS

## 2018-03-08 PROCEDURE — 92928 PRQ TCAT PLMT NTRAC ST 1 LES: CPT | Mod: LD | Performed by: INTERNAL MEDICINE

## 2018-03-08 PROCEDURE — C1753 CATH, INTRAVAS ULTRASOUND: HCPCS

## 2018-03-08 PROCEDURE — 80048 BASIC METABOLIC PNL TOTAL CA: CPT | Performed by: INTERNAL MEDICINE

## 2018-03-08 PROCEDURE — C1874 STENT, COATED/COV W/DEL SYS: HCPCS

## 2018-03-08 PROCEDURE — A9270 NON-COVERED ITEM OR SERVICE: HCPCS | Mod: GY | Performed by: STUDENT IN AN ORGANIZED HEALTH CARE EDUCATION/TRAINING PROGRAM

## 2018-03-08 PROCEDURE — A9270 NON-COVERED ITEM OR SERVICE: HCPCS | Mod: GY | Performed by: INTERNAL MEDICINE

## 2018-03-08 PROCEDURE — 99153 MOD SED SAME PHYS/QHP EA: CPT

## 2018-03-08 PROCEDURE — 25000132 ZZH RX MED GY IP 250 OP 250 PS 637: Mod: GY | Performed by: STUDENT IN AN ORGANIZED HEALTH CARE EDUCATION/TRAINING PROGRAM

## 2018-03-08 PROCEDURE — 40000065 ZZH STATISTIC EKG NON-CHARGEABLE

## 2018-03-08 PROCEDURE — 27210787 ZZH MANIFOLD CR2

## 2018-03-08 PROCEDURE — 25000132 ZZH RX MED GY IP 250 OP 250 PS 637: Mod: GY | Performed by: INTERNAL MEDICINE

## 2018-03-08 PROCEDURE — 27210946 ZZH KIT HC TOTES DISP CR8

## 2018-03-08 PROCEDURE — 27210760 ZZH DEVICE INFLATION CR7

## 2018-03-08 PROCEDURE — C9600 PERC DRUG-EL COR STENT SING: HCPCS

## 2018-03-08 PROCEDURE — 85347 COAGULATION TIME ACTIVATED: CPT | Mod: 91

## 2018-03-08 PROCEDURE — 99152 MOD SED SAME PHYS/QHP 5/>YRS: CPT

## 2018-03-08 PROCEDURE — 27211089 ZZH KIT ACIST INJECTOR CR3

## 2018-03-08 PROCEDURE — 25000125 ZZHC RX 250: Performed by: STUDENT IN AN ORGANIZED HEALTH CARE EDUCATION/TRAINING PROGRAM

## 2018-03-08 PROCEDURE — 93010 ELECTROCARDIOGRAM REPORT: CPT | Mod: 76 | Performed by: INTERNAL MEDICINE

## 2018-03-08 PROCEDURE — C1769 GUIDE WIRE: HCPCS

## 2018-03-08 PROCEDURE — C1725 CATH, TRANSLUMIN NON-LASER: HCPCS

## 2018-03-08 PROCEDURE — 92978 ENDOLUMINL IVUS OCT C 1ST: CPT

## 2018-03-08 PROCEDURE — 40000172 ZZH STATISTIC PROCEDURE PREP ONLY

## 2018-03-08 PROCEDURE — C1887 CATHETER, GUIDING: HCPCS

## 2018-03-08 PROCEDURE — 25000128 H RX IP 250 OP 636: Performed by: INTERNAL MEDICINE

## 2018-03-08 PROCEDURE — 25000128 H RX IP 250 OP 636: Performed by: STUDENT IN AN ORGANIZED HEALTH CARE EDUCATION/TRAINING PROGRAM

## 2018-03-08 PROCEDURE — 82962 GLUCOSE BLOOD TEST: CPT

## 2018-03-08 PROCEDURE — 27210998 ZZH ACCESS HEART CATH CR6

## 2018-03-08 PROCEDURE — 85027 COMPLETE CBC AUTOMATED: CPT | Performed by: INTERNAL MEDICINE

## 2018-03-08 RX ORDER — DOBUTAMINE HYDROCHLORIDE 200 MG/100ML
2-20 INJECTION INTRAVENOUS CONTINUOUS PRN
Status: DISCONTINUED | OUTPATIENT
Start: 2018-03-08 | End: 2018-03-08 | Stop reason: HOSPADM

## 2018-03-08 RX ORDER — CLOPIDOGREL BISULFATE 75 MG/1
300-600 TABLET ORAL
Status: COMPLETED | OUTPATIENT
Start: 2018-03-08 | End: 2018-03-08

## 2018-03-08 RX ORDER — NICARDIPINE HYDROCHLORIDE 2.5 MG/ML
100 INJECTION INTRAVENOUS
Status: DISCONTINUED | OUTPATIENT
Start: 2018-03-08 | End: 2018-03-08 | Stop reason: HOSPADM

## 2018-03-08 RX ORDER — DOPAMINE HYDROCHLORIDE 160 MG/100ML
2-20 INJECTION, SOLUTION INTRAVENOUS CONTINUOUS PRN
Status: DISCONTINUED | OUTPATIENT
Start: 2018-03-08 | End: 2018-03-08 | Stop reason: HOSPADM

## 2018-03-08 RX ORDER — NITROGLYCERIN 0.4 MG/1
0.4 TABLET SUBLINGUAL EVERY 5 MIN PRN
Status: DISCONTINUED | OUTPATIENT
Start: 2018-03-08 | End: 2018-03-08 | Stop reason: HOSPADM

## 2018-03-08 RX ORDER — ONDANSETRON 2 MG/ML
4 INJECTION INTRAMUSCULAR; INTRAVENOUS EVERY 4 HOURS PRN
Status: DISCONTINUED | OUTPATIENT
Start: 2018-03-08 | End: 2018-03-08 | Stop reason: HOSPADM

## 2018-03-08 RX ORDER — ASPIRIN 81 MG/1
81 TABLET ORAL DAILY
Status: DISCONTINUED | OUTPATIENT
Start: 2018-03-08 | End: 2018-03-08 | Stop reason: HOSPADM

## 2018-03-08 RX ORDER — SODIUM NITROPRUSSIDE 25 MG/ML
100-200 INJECTION INTRAVENOUS
Status: DISCONTINUED | OUTPATIENT
Start: 2018-03-08 | End: 2018-03-08 | Stop reason: HOSPADM

## 2018-03-08 RX ORDER — VERAPAMIL HYDROCHLORIDE 2.5 MG/ML
1-5 INJECTION, SOLUTION INTRAVENOUS
Status: DISCONTINUED | OUTPATIENT
Start: 2018-03-08 | End: 2018-03-08 | Stop reason: HOSPADM

## 2018-03-08 RX ORDER — SODIUM CHLORIDE 9 MG/ML
INJECTION, SOLUTION INTRAVENOUS CONTINUOUS
Status: DISCONTINUED | OUTPATIENT
Start: 2018-03-08 | End: 2018-03-08 | Stop reason: HOSPADM

## 2018-03-08 RX ORDER — PROMETHAZINE HYDROCHLORIDE 25 MG/ML
6.25-25 INJECTION, SOLUTION INTRAMUSCULAR; INTRAVENOUS EVERY 4 HOURS PRN
Status: DISCONTINUED | OUTPATIENT
Start: 2018-03-08 | End: 2018-03-08 | Stop reason: HOSPADM

## 2018-03-08 RX ORDER — CLOPIDOGREL BISULFATE 75 MG/1
75 TABLET ORAL
Status: DISCONTINUED | OUTPATIENT
Start: 2018-03-08 | End: 2018-03-08 | Stop reason: HOSPADM

## 2018-03-08 RX ORDER — EPTIFIBATIDE 2 MG/ML
1 INJECTION, SOLUTION INTRAVENOUS CONTINUOUS PRN
Status: DISCONTINUED | OUTPATIENT
Start: 2018-03-08 | End: 2018-03-08 | Stop reason: HOSPADM

## 2018-03-08 RX ORDER — HEPARIN SODIUM 1000 [USP'U]/ML
1000-10000 INJECTION, SOLUTION INTRAVENOUS; SUBCUTANEOUS EVERY 5 MIN PRN
Status: DISCONTINUED | OUTPATIENT
Start: 2018-03-08 | End: 2018-03-08 | Stop reason: HOSPADM

## 2018-03-08 RX ORDER — FLUMAZENIL 0.1 MG/ML
0.2 INJECTION, SOLUTION INTRAVENOUS
Status: DISCONTINUED | OUTPATIENT
Start: 2018-03-08 | End: 2018-03-08 | Stop reason: HOSPADM

## 2018-03-08 RX ORDER — DEXTROSE MONOHYDRATE 25 G/50ML
12.5-5 INJECTION, SOLUTION INTRAVENOUS EVERY 30 MIN PRN
Status: DISCONTINUED | OUTPATIENT
Start: 2018-03-08 | End: 2018-03-08 | Stop reason: HOSPADM

## 2018-03-08 RX ORDER — NITROGLYCERIN 20 MG/100ML
.07-2 INJECTION INTRAVENOUS CONTINUOUS PRN
Status: DISCONTINUED | OUTPATIENT
Start: 2018-03-08 | End: 2018-03-08 | Stop reason: HOSPADM

## 2018-03-08 RX ORDER — ATROPINE SULFATE 0.1 MG/ML
.5-1 INJECTION INTRAVENOUS
Status: DISCONTINUED | OUTPATIENT
Start: 2018-03-08 | End: 2018-03-08 | Stop reason: HOSPADM

## 2018-03-08 RX ORDER — DIPHENHYDRAMINE HYDROCHLORIDE 50 MG/ML
25-50 INJECTION INTRAMUSCULAR; INTRAVENOUS
Status: DISCONTINUED | OUTPATIENT
Start: 2018-03-08 | End: 2018-03-08 | Stop reason: HOSPADM

## 2018-03-08 RX ORDER — ARGATROBAN 1 MG/ML
350 INJECTION, SOLUTION INTRAVENOUS
Status: DISCONTINUED | OUTPATIENT
Start: 2018-03-08 | End: 2018-03-08 | Stop reason: HOSPADM

## 2018-03-08 RX ORDER — ATROPINE SULFATE 0.1 MG/ML
0.5 INJECTION INTRAVENOUS EVERY 5 MIN PRN
Status: DISCONTINUED | OUTPATIENT
Start: 2018-03-08 | End: 2018-03-08 | Stop reason: HOSPADM

## 2018-03-08 RX ORDER — PROTAMINE SULFATE 10 MG/ML
25-100 INJECTION, SOLUTION INTRAVENOUS EVERY 5 MIN PRN
Status: DISCONTINUED | OUTPATIENT
Start: 2018-03-08 | End: 2018-03-08 | Stop reason: HOSPADM

## 2018-03-08 RX ORDER — ADENOSINE 3 MG/ML
12-12000 INJECTION, SOLUTION INTRAVENOUS
Status: DISCONTINUED | OUTPATIENT
Start: 2018-03-08 | End: 2018-03-08 | Stop reason: HOSPADM

## 2018-03-08 RX ORDER — NALOXONE HYDROCHLORIDE 0.4 MG/ML
0.4 INJECTION, SOLUTION INTRAMUSCULAR; INTRAVENOUS; SUBCUTANEOUS EVERY 5 MIN PRN
Status: DISCONTINUED | OUTPATIENT
Start: 2018-03-08 | End: 2018-03-08 | Stop reason: HOSPADM

## 2018-03-08 RX ORDER — LORAZEPAM 2 MG/ML
.5-2 INJECTION INTRAMUSCULAR EVERY 4 HOURS PRN
Status: DISCONTINUED | OUTPATIENT
Start: 2018-03-08 | End: 2018-03-08 | Stop reason: HOSPADM

## 2018-03-08 RX ORDER — ARGATROBAN 1 MG/ML
150 INJECTION, SOLUTION INTRAVENOUS
Status: DISCONTINUED | OUTPATIENT
Start: 2018-03-08 | End: 2018-03-08 | Stop reason: HOSPADM

## 2018-03-08 RX ORDER — FUROSEMIDE 10 MG/ML
20-100 INJECTION INTRAMUSCULAR; INTRAVENOUS
Status: DISCONTINUED | OUTPATIENT
Start: 2018-03-08 | End: 2018-03-08 | Stop reason: HOSPADM

## 2018-03-08 RX ORDER — NITROGLYCERIN 5 MG/ML
100-500 VIAL (ML) INTRAVENOUS
Status: DISCONTINUED | OUTPATIENT
Start: 2018-03-08 | End: 2018-03-08 | Stop reason: HOSPADM

## 2018-03-08 RX ORDER — NALOXONE HYDROCHLORIDE 0.4 MG/ML
.1-.4 INJECTION, SOLUTION INTRAMUSCULAR; INTRAVENOUS; SUBCUTANEOUS
Status: DISCONTINUED | OUTPATIENT
Start: 2018-03-08 | End: 2018-03-08 | Stop reason: HOSPADM

## 2018-03-08 RX ORDER — MAGNESIUM HYDROXIDE 1200 MG/15ML
1000 LIQUID ORAL CONTINUOUS PRN
Status: DISCONTINUED | OUTPATIENT
Start: 2018-03-08 | End: 2018-03-08 | Stop reason: HOSPADM

## 2018-03-08 RX ORDER — ENALAPRILAT 1.25 MG/ML
1.25-2.5 INJECTION INTRAVENOUS
Status: DISCONTINUED | OUTPATIENT
Start: 2018-03-08 | End: 2018-03-08 | Stop reason: HOSPADM

## 2018-03-08 RX ORDER — METOPROLOL TARTRATE 1 MG/ML
5 INJECTION, SOLUTION INTRAVENOUS EVERY 5 MIN PRN
Status: DISCONTINUED | OUTPATIENT
Start: 2018-03-08 | End: 2018-03-08 | Stop reason: HOSPADM

## 2018-03-08 RX ORDER — ASPIRIN 81 MG/1
81-324 TABLET, CHEWABLE ORAL
Status: DISCONTINUED | OUTPATIENT
Start: 2018-03-08 | End: 2018-03-08 | Stop reason: HOSPADM

## 2018-03-08 RX ORDER — EPINEPHRINE 1 MG/ML
0.3 INJECTION, SOLUTION, CONCENTRATE INTRAVENOUS
Status: DISCONTINUED | OUTPATIENT
Start: 2018-03-08 | End: 2018-03-08 | Stop reason: HOSPADM

## 2018-03-08 RX ORDER — NITROGLYCERIN 5 MG/ML
100-200 VIAL (ML) INTRAVENOUS
Status: DISCONTINUED | OUTPATIENT
Start: 2018-03-08 | End: 2018-03-08 | Stop reason: HOSPADM

## 2018-03-08 RX ORDER — LIDOCAINE 40 MG/G
CREAM TOPICAL
Status: DISCONTINUED | OUTPATIENT
Start: 2018-03-08 | End: 2018-03-08 | Stop reason: HOSPADM

## 2018-03-08 RX ORDER — FENTANYL CITRATE 50 UG/ML
25-50 INJECTION, SOLUTION INTRAMUSCULAR; INTRAVENOUS
Status: DISCONTINUED | OUTPATIENT
Start: 2018-03-08 | End: 2018-03-08 | Stop reason: HOSPADM

## 2018-03-08 RX ORDER — SODIUM CHLORIDE 9 MG/ML
INJECTION, SOLUTION INTRAVENOUS CONTINUOUS
Status: ACTIVE | OUTPATIENT
Start: 2018-03-08 | End: 2018-03-08

## 2018-03-08 RX ORDER — EPTIFIBATIDE 2 MG/ML
180 INJECTION, SOLUTION INTRAVENOUS EVERY 10 MIN PRN
Status: DISCONTINUED | OUTPATIENT
Start: 2018-03-08 | End: 2018-03-08 | Stop reason: HOSPADM

## 2018-03-08 RX ORDER — ASPIRIN 325 MG
325 TABLET ORAL
Status: DISCONTINUED | OUTPATIENT
Start: 2018-03-08 | End: 2018-03-08 | Stop reason: HOSPADM

## 2018-03-08 RX ORDER — PRASUGREL 10 MG/1
10-60 TABLET, FILM COATED ORAL
Status: DISCONTINUED | OUTPATIENT
Start: 2018-03-08 | End: 2018-03-08 | Stop reason: HOSPADM

## 2018-03-08 RX ORDER — PROTAMINE SULFATE 10 MG/ML
1-5 INJECTION, SOLUTION INTRAVENOUS
Status: DISCONTINUED | OUTPATIENT
Start: 2018-03-08 | End: 2018-03-08 | Stop reason: HOSPADM

## 2018-03-08 RX ORDER — POTASSIUM CHLORIDE 7.45 MG/ML
10 INJECTION INTRAVENOUS
Status: DISCONTINUED | OUTPATIENT
Start: 2018-03-08 | End: 2018-03-08 | Stop reason: HOSPADM

## 2018-03-08 RX ORDER — HYDRALAZINE HYDROCHLORIDE 20 MG/ML
10-20 INJECTION INTRAMUSCULAR; INTRAVENOUS
Status: DISCONTINUED | OUTPATIENT
Start: 2018-03-08 | End: 2018-03-08 | Stop reason: HOSPADM

## 2018-03-08 RX ORDER — LIDOCAINE HYDROCHLORIDE 10 MG/ML
30 INJECTION, SOLUTION EPIDURAL; INFILTRATION; INTRACAUDAL; PERINEURAL
Status: DISCONTINUED | OUTPATIENT
Start: 2018-03-08 | End: 2018-03-08 | Stop reason: HOSPADM

## 2018-03-08 RX ORDER — METHYLPREDNISOLONE SODIUM SUCCINATE 125 MG/2ML
125 INJECTION, POWDER, LYOPHILIZED, FOR SOLUTION INTRAMUSCULAR; INTRAVENOUS
Status: DISCONTINUED | OUTPATIENT
Start: 2018-03-08 | End: 2018-03-08 | Stop reason: HOSPADM

## 2018-03-08 RX ORDER — POTASSIUM CHLORIDE 29.8 MG/ML
20 INJECTION INTRAVENOUS
Status: DISCONTINUED | OUTPATIENT
Start: 2018-03-08 | End: 2018-03-08 | Stop reason: HOSPADM

## 2018-03-08 RX ORDER — PHENYLEPHRINE HCL IN 0.9% NACL 1 MG/10 ML
20-100 SYRINGE (ML) INTRAVENOUS
Status: DISCONTINUED | OUTPATIENT
Start: 2018-03-08 | End: 2018-03-08 | Stop reason: HOSPADM

## 2018-03-08 RX ORDER — NALOXONE HYDROCHLORIDE 0.4 MG/ML
.2-.4 INJECTION, SOLUTION INTRAMUSCULAR; INTRAVENOUS; SUBCUTANEOUS
Status: DISCONTINUED | OUTPATIENT
Start: 2018-03-08 | End: 2018-03-08 | Stop reason: HOSPADM

## 2018-03-08 RX ORDER — NIFEDIPINE 10 MG/1
10 CAPSULE ORAL
Status: DISCONTINUED | OUTPATIENT
Start: 2018-03-08 | End: 2018-03-08 | Stop reason: HOSPADM

## 2018-03-08 RX ADMIN — HEPARIN SODIUM 3000 UNITS: 1000 INJECTION, SOLUTION INTRAVENOUS; SUBCUTANEOUS at 11:20

## 2018-03-08 RX ADMIN — Medication 100 MCG: at 10:41

## 2018-03-08 RX ADMIN — MIDAZOLAM 2 MG: 1 INJECTION INTRAMUSCULAR; INTRAVENOUS at 10:21

## 2018-03-08 RX ADMIN — Medication 100 MCG: at 10:36

## 2018-03-08 RX ADMIN — HEPARIN SODIUM 8000 UNITS: 1000 INJECTION, SOLUTION INTRAVENOUS; SUBCUTANEOUS at 10:33

## 2018-03-08 RX ADMIN — ASPIRIN 325 MG: 325 TABLET ORAL at 09:47

## 2018-03-08 RX ADMIN — MIDAZOLAM 1 MG: 1 INJECTION INTRAMUSCULAR; INTRAVENOUS at 11:21

## 2018-03-08 RX ADMIN — FENTANYL CITRATE 50 MCG: 50 INJECTION, SOLUTION INTRAMUSCULAR; INTRAVENOUS at 10:29

## 2018-03-08 RX ADMIN — CLOPIDOGREL BISULFATE 300 MG: 75 TABLET ORAL at 11:37

## 2018-03-08 RX ADMIN — HEPARIN SODIUM 1500 UNITS: 1000 INJECTION, SOLUTION INTRAVENOUS; SUBCUTANEOUS at 10:23

## 2018-03-08 RX ADMIN — FENTANYL CITRATE 50 MCG: 50 INJECTION, SOLUTION INTRAMUSCULAR; INTRAVENOUS at 10:59

## 2018-03-08 RX ADMIN — SODIUM CHLORIDE: 9 INJECTION, SOLUTION INTRAVENOUS at 13:42

## 2018-03-08 RX ADMIN — Medication 100 MCG: at 10:47

## 2018-03-08 RX ADMIN — HEPARIN SODIUM 2000 UNITS: 1000 INJECTION, SOLUTION INTRAVENOUS; SUBCUTANEOUS at 11:04

## 2018-03-08 NOTE — PROGRESS NOTES
Patient is ready for the PCI, Wife with him. Blood work pending. Consent signed. Blood glucose 138

## 2018-03-08 NOTE — PROCEDURES
PRELIMINARY CARDIAC CATH REPORT:     PROCEDURES PERFORMED:   1. Selective left and right coronary angiography.  2. Percutaneous coronary intervention of the left main, mid and proximal LAD, proximal and mid RCA.  3. Intravascular ultrasound.    PHYSICIANS:  1. Attending Interventional Cardiology Staff: Ghanshyam Moore MD  2. Cardiology Fellow: Jourdan Brown MD     INDICATION:  Amos Walker is a 72 year old gentleman with a history of PAD, and CAD presenting with two vessel CAD involving the LAD, RCA and left main for PCI.    DESCRIPTION:  1. Sterile prep and procedure.  2. Location: right common femoral artery.  3. Access: Local anesthetic with lidocaine.  A standard (18 g) needle with ultrasound guidance was used to establish vascular access using a modified Seldinger technique.  4. Sheath: 6 Fr long in the RFA  5. Catheters: 6Fr XB 3.5 with SH for LCA, JR-4 with SH for RCA, 6Fr 3DRC and JL4 for diagnostics  6. Fluoroscopy time of 22.6 min.  7. Estimated blood loss of <5 mL.  8. See below for procedure details.    MEDICATIONS:  1. Contrast 330 mL IV.  2. Conscious sedation with fentanyl and midazolam as directed by the attending cardiologist. (70 min, 100 mcg fentanyl and 3 mg versed)  3. Heparin administered to achieve a goal ACT > 250 sec.   4. Nitroglycerin intracoronary.    CORONARY ANGIOGRAM:   1. Both coronary arteries arise from their respective cusps.  2. Right dominant.  3. LM has 50-60% disease with a calcified ostium.  4. LAD is a type 3 vessel and gives rise to septal perforators, D1 and a small D2.  The pLAD has a 60% calcified stenosis, mLAD has a 70% stenosis, dLAD has a mild 30% stenosis, D1 has a 50% stenosis.D2 has minimal disease.  5. LCX gives rise to OM1.  The pLCx has a 50% stenosis at the bifurcation of the OM1, AV groove LCx has minimal <25%, OM1 is a large bifurcating vessel with 40% stenosis.    6. RCA gives rise to PL branches and supplies PDA. The pRCA has a 50-60% stenosis, mRCA  has a 70% stenosis, dRCA has a 30% stenosis, RPDA has a <25% stenosis and RPLA has a <25% stenosis.    7. RI is a medium caliber vessel with 40% stenosis.    INTRA-VASCULAR ULTRASOUND:  Adequate anticoagulation was was obtained.  A Jus blue wire was positioned in the mid LAD.  An Powered Now IVUS catheter was used for the imaging evaluation.   There was good stent apposition in the mid proximal LAD and left main with under expansion of the stent in the proximal LAD and ostial LM due to heavy calcification.  Mid LAD minimal lumen area of 9.9 mm2.  Prox LAD MLA of 8.01 mm2.  LM MLA of 12.18 mm2.  Ostial LM MLA of 8.9 mm2.    PERCUTANEOUS CORONARY INTERVENTION:  1. Proximal - Mid LAD Lesion:  A 6Fr XB 3.5 SH guide catheter was positioned at the ostium of the LM. Heparin was administered to achieve a goal ACT > 250 sec.  A Jus blue wire was advanced across the mid LAD lesion and positioned in the apical LAD.  A 3.5x38mm Synergy drug eluting stent was successfully deployed across the lesion with inflation to 16 nick.  Post deployment IVUS showed good stent apposition with under expansion of the proximal aspect of the stent. The stent was then post-dilated with a 3.5x20mm Emerge NC balloon.    2. LM Lesion:  A 4.0x12mm Synergy drug eluting stent was successfully deployed across the lesion with inflation to 16 nick.  Post deployment IVUS showed good stent apposition with under expansion of the ostium of the LM. The stent was then post-dilated with a 4.0x12mm Emerge NC balloon.  Final angiography showed no evidence of perforation or dissection with residual stenosis of 0% and FREDDY 3 flow.  No complications.    3. Mid RCA Lesion:  A 6Fr JR 4 guide catheter was positioned at the ostium of the RCA.  A Jus blue wire was advanced across the mid RCA lesion and positioned in the distal RCA.  A 2.19f96lh Synergy drug eluting stent was successfully deployed across the lesion with inflation to 16 nick.    4. Ostial RCA Lesion:  A  3.0x12mm Synergy drug eluting stent was successfully deployed across the lesion (overlapping with midRCA) with inflation to 16 nick. The stent was post-dilated with a 3.0x12mm stent balloon including the mid RCA stent. Final angiography showed no evidence of perforation or dissection with residual stenosis of 0% and FREDDY 3 flow.  No complications.    COMPLICATIONS:  1. None    SUMMARY:   1. Two vessel CAD (RCA/LAD) involving the LM.  2. PCI with successful deployment of a drug eluting stent to the mid/proximal LAD, LM, and mid/proximal and ostial RCA.  3. Severe peripheral arterial disease.  4. Sheath was secured in place.    PLAN:   1. Aspirin 81 mg po daily lifelong.  2. Plavix 75 mg po daily for at least 1 year uninterrupted.  3. Bedrest per protocol.  4. Discharge today per protocol.  5. Continued medical management and lifestyle modification for cardiovascular risk factor optimization.     The attending interventional cardiologist was present for the entire procedure.    See CVIS Cath report for final draft.

## 2018-03-08 NOTE — IP AVS SNAPSHOT
MRN:[id number]                      After Visit Summary   3/8/2018    Amos Walker    MRN: [id number]           Thank you!     Thank you for choosing [facility] for your care. Our goal is always to provide you with excellent care. Hearing back from our patients is one way we can continue to improve our services. Please take a few minutes to complete the written survey that you may receive in the mail after you visit with us. Thank you!        Patient Information     Date Of Birth          1945        About your hospital stay     You were admitted on:  March 8, 2018 You last received care in the:  Unit 6D Observation [facility]    You were discharged on:  March 8, 2018       Who to Call     For medical emergencies, please call 911.  For non-urgent questions about your medical care, please call your primary care provider or clinic, [phone]          Attending Provider     Provider Specialty    Ghanshyam Moore MD Cardiology       Primary Care Provider Office Phone # Fax #    Racheal Swift [phone]      After Care Instructions     Discharge Instructions - IF on Metformin (Glucophage or Glucovance) or Metformin containing medications       IF on Metformin (Glucophage or Glucovance) or Metformin containing medications , schedule a Basic Metabolic Panel at [facility] or Primary Clinic in 48 - 72 hours post procedure and PRIOR TO resuming the Metformin or Metformin containing medications.  Hold Metformin (Glucophage or Glucovance) or Metformin containing medications until after the Basic Metabolic Panel on the 2nd or 3rd day following the procedure.  May resume after blood draw is complete.                  Your next 10 appointments already scheduled     Mar 13, 2018  9:30 AM CDT   Return Visit with Brayan Mcclain DPM   [facility]    [address] [place] [address] [phone]            Mar 19,  2018 11:00 AM CDT   (Arrive by 10:45 AM)   Return Visit with Racheal Swift MD   [facility] ([facility])    [address] [address] [phone]            Mar 20, 2018 10:00 AM CDT   Return Visit with Brayan Mcclain DPM   [facility]    [address] [facility] [address]   [phone]            Mar 27, 2018  9:30 AM CDT   Return Visit with Brayan Mcclain DPM   [facility]    [address] [facility] [address]   [phone]            Apr 03, 2018  9:00 AM CDT   Return Visit with Brayan Mcclain DPM   [facility]    [address] [facility] [address]   [phone]            Apr 10, 2018  9:30 AM CDT   Return Visit with Brayan Mcclain DPM   [facility]    [address] [facility] [address]   [phone]            Apr 17, 2018 10:30 AM CDT   Return Visit with Brayan Mcclain DPM   [facility]    [address] [facility] [address]   [phone]            Apr 24, 2018  9:30 AM CDT   Return Visit with Brayan Mcclain DPM   [facility]    [address] [facility] [address]   [phone]            Apr 27, 2018 10:25 AM CDT   (Arrive by 10:10 AM)   Return Visit with Racheal Swift MD   [facility] ([facility])    [address] [address] [phone]            May 01, 2018  9:30 AM CDT   Return Visit with Brayan Mcclain DPM   [facility]    [address] [place] [id number] [phone]              Additional Services      CARDIAC REHAB REFERRAL       Please be aware that coverage of these services is subject to the terms and limitations of your health insurance plan.  Call member services at your health plan with any benefit or coverage questions.     Order is sent electronically to central rehab scheduling. Call [phone] if you haven't been contacted regarding these appointments within 2 business days of discharge.                  Further instructions from your care team       Going Home after Coronary Angioplasty or Stent Placement        Name: Amos Walker  Medical Record Number:  [id number]  Today's Date: March 8, 2018        For 24 hours:         Have an adult stay with you for 24 hours.         Relax and take it easy.         Drink plenty of fluids.         You may eat your normal diet, unless your doctor tells you otherwise.         Do NOT make any important or legal decisions.         Do NOT drive or operate machines at home or at work.         Do NOT drink alcohol.      Do NOT smoke.     Medicines:         If you have begun Plavix (clopidogrel), Effient (prasugrel), or Brilinta (ticagrelor), do not stop taking it until you talk to your heart doctor (cardiologist).         If you are on metformin (Glucophage), do not restart it until you have blood tests (within 2 to 3 days after discharge). When your doctor tells you it is safe, you may restart the metformin.         If you have stopped any other medicines, check with your nurse or provider about when to restart them.    Care of groin site:         Remove the Band-Aid after 24 hours. If there is minor oozing, apply another Band-aid and remove it after 12 hours.          Do NOT take a bath, or use a hot tub or pool for at least 3 days. You may shower.          It is normal to have a small bruise or lump at the site.         Do not scrub the site.         Do not use lotion or powder near the puncture site for 3 days.         For the first 2 days: Do not stoop or  "squat. When you cough, sneeze or move your bowels, hold your hand over the puncture site and press gently.         Do not lift more than 10 pounds for at least 3 to 5 days.         For 2 days, do NOT have sex or do any heavy exercise.     If you start bleeding from the site in your groin:  Lie down flat and press firmly on the site.  Call your physician immediately, or, come to the emergency room.      Call 911 right away if you have bleeding that is heavy or does not stop.     Call your doctor if:         You have a large or growing hard lump around the site.         The site is red, swollen, hot or tender.         Blood or fluid is draining from the site.         You have chills or a fever greater than 101 F (38 C).         Your leg or arm turns bluish, feels numb or cool.         You have hives, a rash or unusual itching.     Cardiac Rehabilitation: You should receive a phone call from the Cardiac Rehabilitation Department within the next week.  If you do not receive the call, please contact [facility] Scheduling at [phone].      [facility] [place]:   [phone] (7 days a week)      Cardiology Fellow on call (24 hours per day) at [facility]:   [phone] (ask for Cardiology Fellow on call)        Pending Results     Date and Time Order Name Status Description    3/8/2018 1200 EKG 12-lead, tracing only  Preliminary     3/8/2018 0846 EKG 12-lead, tracing only Preliminary             Admission Information     Date & Time Provider Department Dept. Phone    3/8/2018 Ghanshyam Moore MD Unit 6D Observation [facility] [phone]      Your Vitals Were     Blood Pressure Pulse Temperature Respirations Height Weight    127/58 78 98.4  F (36.9  C) (Oral) 18 1.88 m (6' 2\") 77.6 kg (171 lb)    Pulse Oximetry BMI (Body Mass Index)                98% 21.96 kg/m2          MyChart Information     [facility] gives you secure access to your electronic health record. " If you see a primary care provider, you can also send messages to your care team and make appointments. If you have questions, please call your primary care clinic.  If you do not have a primary care provider, please call [phone] and they will assist you.        Care EveryWhere ID     This is your Care EveryWhere ID. This could be used by other organizations to access your [facility] medical records  [id number]        Equal Access to Services     SAMSON MELTON : Hadii niurka cortezo Somaameali, waaxda luqadaha, qaybta kaalmada zanetamikakylee, yolanda lunaantoniromulo lopez. So [facility] [phone].    ATENCIÓN: Si pancho wagner, tiene a rodrigues disposición servicios gratuitos de asistencia lingüística. Llame al [phone].    We comply with applicable federal civil rights laws and [place] laws. We do not discriminate on the basis of race, color, national origin, age, disability, sex, sexual orientation, or gender identity.               Review of your medicines      UNREVIEWED medicines. Ask your doctor about these medicines        Dose / Directions    ammonium lactate 12 % cream   Commonly known as:  LAC-HYDRIN   Used for:  Venous stasis, Type 2 diabetes, controlled, with neuropathy (H)        Apply topically 2 times daily as needed for dry skin   Quantity:  385 g   Refills:  3       ascorbic acid 500 MG Tabs   Used for:  Ulcer of right lower leg, with fat layer exposed (H)        Dose:  500 mg   Take 1 tablet (500 mg) by mouth 2 times daily   Quantity:  30 tablet   Refills:  0       ASPIRIN PO        Dose:  81 mg   Take 81 mg by mouth daily   Refills:  0       cephALEXin 500 MG capsule   Commonly known as:  KEFLEX   Used for:  Ulcer of right lower extremity with fat layer exposed (H), Skin ulcer of right foot with fat layer exposed (H), Type II or unspecified type diabetes mellitus with neurological manifestations, not stated as uncontrolled(250.60) (H), Diabetes mellitus with peripheral vascular disease (H)         Dose:  500 mg   Take 1 capsule (500 mg) by mouth 2 times daily   Quantity:  28 capsule   Refills:  0       clopidogrel 75 MG tablet   Commonly known as:  PLAVIX   Used for:  Peripheral vascular disease, unspecified        Dose:  75 mg   Take 1 tablet (75 mg) by mouth daily   Quantity:  30 tablet   Refills:  11       COLACE PO        Dose:  100 mg   Take 100 mg by mouth daily   Refills:  0       ferrous sulfate 325 (65 FE) MG tablet   Commonly known as:  IRON   Used for:  Peripheral vascular disease, unspecified        Dose:  325 mg   Take 1 tablet (325 mg) by mouth 2 times daily   Quantity:  60 tablet   Refills:  11       gentamicin 0.1 % cream   Commonly known as:  GARAMYCIN   Used for:  Ulcer of right lower leg, with fat layer exposed (H), Chronic venous hypertension with ulcer involving right side (H), Type 2 diabetes, controlled, with neuropathy (H)        Apply topically daily To right leg ulcer.   Quantity:  30 g   Refills:  5       insulin glargine 100 UNIT/ML injection   Commonly known as:  LANTUS SOLOSTAR   Used for:  Type 2 diabetes mellitus with diabetic peripheral angiopathy without gangrene, with long-term current use of insulin (H)        Dose:  25 Units   Inject 25 Units Subcutaneous At Bedtime   Quantity:  15 mL   Refills:  2       LISINOPRIL PO        Dose:  20 mg   Take 20 mg by mouth 2 times daily   Refills:  0       nateglinide 120 MG tablet   Commonly known as:  STARLIX   Used for:  Type 2 diabetes, controlled, with neuropathy (H)        TAKE 1 TABLET BY MOUTH THREE TIMES DAILY BEFORE MEALS   Quantity:  90 tablet   Refills:  11       sildenafil 50 MG tablet   Commonly known as:  VIAGRA   Used for:  Vasculogenic erectile dysfunction, unspecified vasculogenic erectile dysfunction type        Dose:  50 mg   Take 1 tablet (50 mg) by mouth daily as needed for erectile dysfunction   Quantity:  10 tablet   Refills:  11       silver sulfADIAZINE 1 % cream   Commonly known as:  SILVADENE   Used  for:  Ulcer of right lower leg, with fat layer exposed (H), Chronic venous hypertension with ulcer involving right side (H), Type 2 diabetes, controlled, with neuropathy (H)        Apply topically daily To affected areas on right foot and leg.   Quantity:  85 g   Refills:  5       simvastatin 10 MG tablet   Commonly known as:  ZOCOR   Used for:  Type 2 diabetes, controlled, with neuropathy (H)        Dose:  10 mg   Take 1 tablet (10 mg) by mouth At Bedtime   Quantity:  90 tablet   Refills:  3       * sitagliptin 100 MG tablet   Commonly known as:  JANUVIA   Used for:  Type 2 diabetes, controlled, with neuropathy (H)        Dose:  100 mg   Take 1 tablet (100 mg) by mouth daily   Quantity:  90 tablet   Refills:  3       * sitagliptin 100 MG tablet   Commonly known as:  JANUVIA   Used for:  Type 2 diabetes, HbA1c goal < 7% (H)        Dose:  100 mg   Take 1 tablet (100 mg) by mouth daily   Quantity:  90 tablet   Refills:  1       triamcinolone 0.1 % cream   Commonly known as:  KENALOG   Used for:  Dermatitis of left foot        Apply sparingly to left heel daily.   Quantity:  30 g   Refills:  1       VITAMIN C PO        Dose:  1000 mg   Take 1,000 mg by mouth   Refills:  0       VITAMIN D (CHOLECALCIFEROL) PO        Dose:  1000 Units   Take 1,000 Units by mouth 2 times daily   Refills:  0       * Notice:  This list has 2 medication(s) that are the same as other medications prescribed for you. Read the directions carefully, and ask your doctor or other care provider to review them with you.      START taking        Dose / Directions    ANTIPLATELET MEDICATION NOT PRESCRIBED (INTENTIONAL)   Used for:  Coronary artery disease due to lipid rich plaque        Antiplatelet medication not prescribed intentionally due to Already on DAPT (clopidogrel/prasugrel/ticagrelor)   Quantity:  0 each   Refills:  0         CONTINUE these medicines which have NOT CHANGED        Dose / Directions    blood glucose monitoring lancets   Used  "for:  Type 2 diabetes, uncontrolled, with neuropathy (H)        Use to test blood sugars 2 as directed.   Quantity:  3 Box   Refills:  3       blood glucose monitoring test strip   Commonly known as:  ONETOUCH ULTRA   Used for:  Type 2 diabetes mellitus (H)        Use to test blood sugars 2 times daily or as directed.   Quantity:  60 strip   Refills:  11       insulin pen needle 31G X 8 MM   Commonly known as:  B-D U/F   Used for:  Diabetes mellitus, type II (H)        Use 1 daily o as directed   Quantity:  100 each   Refills:  3       OPTIFOAM 6\"X6\" Pads   Used for:  Ulcer of right leg, with fat layer exposed (H)        Dose:  1 Box   1 Box once a week   Quantity:  1 each   Refills:  6       * order for DME   Used for:  Varicose veins of lower extremities with other complications, Venous stasis ulcer of right lower extremity (H)        Please measure and distribute 1 pair of 20mm Hg - 30mm Hg knee high ULCER CARE open or closed toe compression stockings.  Patient has a size 13 foot and please take this into consideration.  Jobst or equivalent   Quantity:  2 each   Refills:  1       * order for DME   Used for:  Varicose veins of both lower extremities with complications        Please measure and distribute 1 pair of 20mmHg - 30mmHg knee high open or closed toe compression stockings. Jobst ultrasheer or equivalent.   Quantity:  3 each   Refills:  12       * order for DME   Used for:  Varicose veins of bilateral lower extremities with other complications        Please measure and distribute 1 pair of 30mmHg - 40mmHg knee high open toe ulcercare compression stockings. Jobst ultrasheer or equivalent.   Quantity:  2 each   Refills:  6       * Notice:  This list has 3 medication(s) that are the same as other medications prescribed for you. Read the directions carefully, and ask your doctor or other care provider to review them with you.         Where to get your medicines      Some of these will need a paper prescription " and others can be bought over the counter. Ask your nurse if you have questions.     You don't need a prescription for these medications     ANTIPLATELET MEDICATION NOT PRESCRIBED (INTENTIONAL)                Protect others around you: Learn how to safely use, store and throw away your medicines at www.disposemymeds.org.             Medication List: This is a list of all your medications and when to take them. Check marks below indicate your daily home schedule. Keep this list as a reference.      Medications           Morning Afternoon Evening Bedtime As Needed    ammonium lactate 12 % cream   Commonly known as:  LAC-HYDRIN   Apply topically 2 times daily as needed for dry skin                                ANTIPLATELET MEDICATION NOT PRESCRIBED (INTENTIONAL)   Antiplatelet medication not prescribed intentionally due to Already on DAPT (clopidogrel/prasugrel/ticagrelor)                                ascorbic acid 500 MG Tabs   Take 1 tablet (500 mg) by mouth 2 times daily                                ASPIRIN PO   Take 81 mg by mouth daily   Last time this was given:  325 mg on 3/8/2018  9:47 AM                                blood glucose monitoring lancets   Use to test blood sugars 2 as directed.                                blood glucose monitoring test strip   Commonly known as:  ONETOUCH ULTRA   Use to test blood sugars 2 times daily or as directed.                                cephALEXin 500 MG capsule   Commonly known as:  KEFLEX   Take 1 capsule (500 mg) by mouth 2 times daily                                clopidogrel 75 MG tablet   Commonly known as:  PLAVIX   Take 1 tablet (75 mg) by mouth daily   Last time this was given:  300 mg on 3/8/2018 11:37 AM                                COLACE PO   Take 100 mg by mouth daily                                ferrous sulfate 325 (65 FE) MG tablet   Commonly known as:  IRON   Take 1 tablet (325 mg) by mouth 2 times daily                                 "gentamicin 0.1 % cream   Commonly known as:  GARAMYCIN   Apply topically daily To right leg ulcer.                                insulin glargine 100 UNIT/ML injection   Commonly known as:  LANTUS SOLOSTAR   Inject 25 Units Subcutaneous At Bedtime                                insulin pen needle 31G X 8 MM   Commonly known as:  B-D U/F   Use 1 daily o as directed                                LISINOPRIL PO   Take 20 mg by mouth 2 times daily                                nateglinide 120 MG tablet   Commonly known as:  STARLIX   TAKE 1 TABLET BY MOUTH THREE TIMES DAILY BEFORE MEALS                                OPTIFOAM 6\"X6\" Pads   1 Box once a week                                * order for DME   Please measure and distribute 1 pair of 20mm Hg - 30mm Hg knee high ULCER CARE open or closed toe compression stockings.  Patient has a size 13 foot and please take this into consideration.  Jobst or equivalent                                * order for DME   Please measure and distribute 1 pair of 20mmHg - 30mmHg knee high open or closed toe compression stockings. Jobst ultrasheer or equivalent.                                * order for DME   Please measure and distribute 1 pair of 30mmHg - 40mmHg knee high open toe ulcercare compression stockings. Jobst ultrasheer or equivalent.                                sildenafil 50 MG tablet   Commonly known as:  VIAGRA   Take 1 tablet (50 mg) by mouth daily as needed for erectile dysfunction                                silver sulfADIAZINE 1 % cream   Commonly known as:  SILVADENE   Apply topically daily To affected areas on right foot and leg.                                simvastatin 10 MG tablet   Commonly known as:  ZOCOR   Take 1 tablet (10 mg) by mouth At Bedtime                                * sitagliptin 100 MG tablet   Commonly known as:  JANUVIA   Take 1 tablet (100 mg) by mouth daily                                * sitagliptin 100 MG tablet   Commonly known " as:  JANUVIA   Take 1 tablet (100 mg) by mouth daily                                triamcinolone 0.1 % cream   Commonly known as:  KENALOG   Apply sparingly to left heel daily.                                VITAMIN C PO   Take 1,000 mg by mouth                                VITAMIN D (CHOLECALCIFEROL) PO   Take 1,000 Units by mouth 2 times daily                                * Notice:  This list has 5 medication(s) that are the same as other medications prescribed for you. Read the directions carefully, and ask your doctor or other care provider to review them with you.

## 2018-03-08 NOTE — IP AVS SNAPSHOT
Unit 6D Observation [address] [address]    Phone:  [phone]    Fax:  [phone]                                       After Visit Summary   3/8/2018    Amos Walker    MRN: [id number]           After Visit Summary Signature Page     I have received my discharge instructions, and my questions have been answered. I have discussed any challenges I see with this plan with the nurse or doctor.    ..........................................................................................................................................  Patient/Patient Representative Signature      ..........................................................................................................................................  Patient Representative Print Name and Relationship to Patient    ..................................................               ................................................  Date                                            Time    ..........................................................................................................................................  Reviewed by Signature/Title    ...................................................              ..............................................  Date                                                            Time

## 2018-03-08 NOTE — PROGRESS NOTES
Manual pressure held for 25 minutes to right groin site.  Sheath, size 6 Fr Arterial,  pulled by SAFIA Marcelo.  Site CDI, no hematoma.  Stasis achieved at 1637. Off bedrest @ 2037.

## 2018-03-09 ENCOUNTER — TELEPHONE (OUTPATIENT)
Dept: CARDIOLOGY | Facility: CLINIC | Age: 73
End: 2018-03-09

## 2018-03-09 LAB
INTERPRETATION ECG - MUSE: NORMAL
INTERPRETATION ECG - MUSE: NORMAL

## 2018-03-09 NOTE — TELEPHONE ENCOUNTER
Spoke to pt. States that he is feeling good and has no questions. States he is up and around and is tolerating medications. Discussed where his stents were placed as he was curious. Pt would like final cath report sent to him so he knows where stents were exactly placed. Will mail to pt once cath report is final.    Pt also scheduled for f/u with Dr. Chinchilla on May 22, 2018 at 1:30pm.    Laura Pressley RN

## 2018-03-09 NOTE — DISCHARGE INSTRUCTIONS
Going Home after Coronary Angioplasty or Stent Placement        Name: Amos Walker  Medical Record Number:  [id number]  Today's Date: March 8, 2018        For 24 hours:         Have an adult stay with you for 24 hours.         Relax and take it easy.         Drink plenty of fluids.         You may eat your normal diet, unless your doctor tells you otherwise.         Do NOT make any important or legal decisions.         Do NOT drive or operate machines at home or at work.         Do NOT drink alcohol.      Do NOT smoke.     Medicines:         If you have begun Plavix (clopidogrel), Effient (prasugrel), or Brilinta (ticagrelor), do not stop taking it until you talk to your heart doctor (cardiologist).         If you are on metformin (Glucophage), do not restart it until you have blood tests (within 2 to 3 days after discharge). When your doctor tells you it is safe, you may restart the metformin.         If you have stopped any other medicines, check with your nurse or provider about when to restart them.    Care of groin site:         Remove the Band-Aid after 24 hours. If there is minor oozing, apply another Band-aid and remove it after 12 hours.          Do NOT take a bath, or use a hot tub or pool for at least 3 days. You may shower.          It is normal to have a small bruise or lump at the site.         Do not scrub the site.         Do not use lotion or powder near the puncture site for 3 days.         For the first 2 days: Do not stoop or squat. When you cough, sneeze or move your bowels, hold your hand over the puncture site and press gently.         Do not lift more than 10 pounds for at least 3 to 5 days.         For 2 days, do NOT have sex or do any heavy exercise.     If you start bleeding from the site in your groin:  Lie down flat and press firmly on the site.  Call your physician immediately, or, come to the emergency room.      Call 911 right away if you have bleeding that is heavy or does not  stop.     Call your doctor if:         You have a large or growing hard lump around the site.         The site is red, swollen, hot or tender.         Blood or fluid is draining from the site.         You have chills or a fever greater than 101 F (38 C).         Your leg or arm turns bluish, feels numb or cool.         You have hives, a rash or unusual itching.     Cardiac Rehabilitation: You should receive a phone call from the Cardiac Rehabilitation Department within the next week.  If you do not receive the call, please contact Central Rehabilitation Scheduling at [phone].      [facility] at [place]:   [phone] (7 days a week)      Cardiology Fellow on call (24 hours per day) at [facility], [place]:   [phone] (ask for Cardiology Fellow on call)

## 2018-03-09 NOTE — PROVIDER NOTIFICATION
[facility] called RE pt requesting bedside nurse to verify pt safe to d/c d/t pt had 4 stents placed today.     Per provider, pt safe to d/c with instruction to follow d/c medication instructions and f/u with cardiology as ordered. Patient notified.

## 2018-03-09 NOTE — TELEPHONE ENCOUNTER
Left message for pt to call clinic back to discuss how he is feeling after last angiogram yesterday, to see if he has any questions and to schedule a 3 month f/u per Dr. Chinchilla.    Laura Pressley RN

## 2018-03-12 ENCOUNTER — TELEPHONE (OUTPATIENT)
Dept: INTERNAL MEDICINE | Facility: CLINIC | Age: 73
End: 2018-03-12

## 2018-03-12 NOTE — TELEPHONE ENCOUNTER
Left message for patient to call clinic.  Recently had abnormal FIT testing. I would recommend colonoscopy.  We will need to discuss the logistics of his and we can do that at upcoming appointment.  Thanks  Racheal Swift MD  Internal Medicine

## 2018-03-13 ENCOUNTER — OFFICE VISIT (OUTPATIENT)
Dept: PODIATRY | Facility: CLINIC | Age: 73
End: 2018-03-13
Payer: MEDICARE

## 2018-03-13 VITALS — DIASTOLIC BLOOD PRESSURE: 58 MMHG | SYSTOLIC BLOOD PRESSURE: 103 MMHG | HEART RATE: 88 BPM | OXYGEN SATURATION: 98 %

## 2018-03-13 DIAGNOSIS — I87.8 VENOUS STASIS: ICD-10-CM

## 2018-03-13 DIAGNOSIS — E11.51 DIABETES MELLITUS WITH PERIPHERAL VASCULAR DISEASE (H): ICD-10-CM

## 2018-03-13 DIAGNOSIS — L97.912 ULCER OF RIGHT LOWER EXTREMITY WITH FAT LAYER EXPOSED (H): Primary | ICD-10-CM

## 2018-03-13 PROCEDURE — 99213 OFFICE O/P EST LOW 20 MIN: CPT | Performed by: PODIATRIST

## 2018-03-13 ASSESSMENT — PAIN SCALES - GENERAL: PAINLEVEL: MILD PAIN (2)

## 2018-03-13 NOTE — NURSING NOTE
"Amos Walker's goals for this visit include: Recheck right leg ulcer  He requests these members of his care team be copied on today's visit information: yes    PCP: Racheal Swift    Referring Provider:  Referred Self, MD  No address on file    Chief Complaint   Patient presents with     RECHECK     Right leg ulcer       Initial /58  Pulse 88  SpO2 98% Estimated body mass index is 21.96 kg/(m^2) as calculated from the following:    Height as of 3/8/18: 1.88 m (6' 2\").    Weight as of 3/8/18: 77.6 kg (171 lb).  Medication Reconciliation: complete    "

## 2018-03-13 NOTE — MR AVS SNAPSHOT
After Visit Summary   3/13/2018    Amos Walker    MRN: [id number]           Patient Information     Date Of Birth          1945        Visit Information        Provider Department      3/13/2018 9:30 AM Brayan Mcclain DPM [facility]        Today's Diagnoses     Ulcer of right lower extremity with fat layer exposed (H)    -  1    Venous stasis        Diabetes mellitus with peripheral vascular disease (H)          Care Instructions    Thanks for coming today.  Ortho/Sports Medicine Clinic  [address] [place] [id number]    To schedule future appointments in Ortho Clinic, you may call [phone].    To schedule ordered imaging by your provider:   Call Central Imaging [phone]    To schedule an injection ordered by your provider:  Call Central Imaging Injection scheduling line: [phone]  MyChart available online at:  [email or web address]    Please call if any further questions or concerns ([phone]).  Clinic hours 8 am to 5 pm.    Return to clinic (call) if symptoms worsen or fail to improve.            Follow-ups after your visit        Your next 10 appointments already scheduled     Mar 19, 2018  9:00 AM CDT   Cardiac Treatment with  Cardiac Rehab 1   [facility], [place], Cardiac Rehabilitation ([facility])    [address]st [address]  [facility] [id number] [phone]            Mar 19, 2018 11:00 AM CDT   (Arrive by 10:45 AM)   Return Visit with Racheal Swift MD   [facility] ([facility])    [address]  4th Floor  [facility] [id number] [phone]            Mar 20, 2018 10:00 AM CDT   [facility]    [address] [place] [id number] [phone]            Mar 22, 2018  4:30 PM CDT    Cardiac Treatment with  Cardiac Rehab 1   [facility] [place], Cardiac Rehabilitation ([facility])    [address] 1st [address]  [facility] [address] [phone]            Mar 27, 2018  9:30 AM CDT   Return Visit with Brayan Mcclain DPM   [facility]    [address] [facility] [address]   [phone]            Apr 03, 2018  9:00 AM CDT   Return Visit with Brayan Mcclain DPM   [facility]    [address] [facility] [address]   [phone]            Apr 10, 2018  9:30 AM CDT   Return Visit with Brayan Mcclain DPM   [facility]    [address] [facility] [address] [phone]            Apr 17, 2018 10:30 AM CDT   Return Visit with Brayan Mcclain DPM   [facility] ([facility])    [address] [facility] [address] [phone]            Apr 24, 2018  9:30 AM CDT   Return Visit with Brayan Mcclain DPM   [facility] ([facility])    [address] [facility] [address] [phone]            Apr 27, 2018 10:25 AM CDT   (Arrive by 10:10 AM)   Return Visit with Racheal Swift MD   [facility] ([facility])    [address] [facility]  4th Floor  [facility] [id number] [phone]              Who to contact     If you have questions or need follow up information about today's clinic visit or your schedule please contact [facility] directly at [phone].  Normal or non-critical lab and imaging results will be communicated to you by MyChart, letter or phone within 4 business days after the clinic has received the results. If you do not hear from us within 7 days, please contact the  clinic through [employer] or phone. If you have a critical or abnormal lab result, we will notify you by phone as soon as possible.  Submit refill requests through [employer] or call your pharmacy and they will forward the refill request to us. Please allow 3 business days for your refill to be completed.          Additional Information About Your Visit        mYwindowhart Information     [employer] gives you secure access to your electronic health record. If you see a primary care provider, you can also send messages to your care team and make appointments. If you have questions, please call your primary care clinic.  If you do not have a primary care provider, please call [phone] and they will assist you.      [employer] is an electronic gateway that provides easy, online access to your medical records. With [employer], you can request a clinic appointment, read your test results, renew a prescription or communicate with your care team.     To access your existing account, please contact your [facility] or call [phone] for assistance.        Care EveryWhere ID     This is your Care EveryWhere ID. This could be used by other organizations to access your [facility] medical records  [id number]        Your Vitals Were     Pulse Pulse Oximetry                88 98%           Blood Pressure from Last 3 Encounters:   03/13/18 103/58   03/08/18 115/52   03/06/18 114/53    Weight from Last 3 Encounters:   03/15/18 76.7 kg (169 lb)   03/08/18 77.6 kg (171 lb)   03/05/18 77.6 kg (171 lb)              Today, you had the following     No orders found for display         Today's Medication Changes          These changes are accurate as of 3/13/18 11:59 PM.  If you have any questions, ask your nurse or doctor.               These medicines have changed or have updated prescriptions.        Dose/Directions    clopidogrel 75 MG tablet   Commonly known as:  PLAVIX   This may have changed:  when to take this    Used for:  Peripheral vascular disease, unspecified        Dose:  75 mg   Take 1 tablet (75 mg) by mouth daily   Quantity:  30 tablet   Refills:  11       gentamicin 0.1 % cream   Commonly known as:  GARAMYCIN   This may have changed:    - when to take this  - reasons to take this  - additional instructions   Used for:  Ulcer of right lower leg, with fat layer exposed (H), Chronic venous hypertension with ulcer involving right side (H), Type 2 diabetes, controlled, with neuropathy (H)        Apply topically daily To right leg ulcer.   Quantity:  30 g   Refills:  5       insulin glargine 100 UNIT/ML injection   Commonly known as:  LANTUS SOLOSTAR   This may have changed:  when to take this   Used for:  Type 2 diabetes mellitus with diabetic peripheral angiopathy without gangrene, with long-term current use of insulin (H)        Dose:  25 Units   Inject 25 Units Subcutaneous At Bedtime   Quantity:  15 mL   Refills:  2                Primary Care Provider Office Phone # Fax #    MaconDionne Swift [phone]       [address] [address]        Equal Access to Services     [facility] : Hadii niurka cortezo Soger, waaxda luqadaha, qaybta kaalmada adeegyakylee, yolanda duran . So [facility] [phone].    ATENCIÓN: Si habla español, tiene a rodrigues disposición servicios gratuitos de asistencia lingüística. Llame al [phone].    We comply with applicable federal civil rights laws and [place] laws. We do not discriminate on the basis of race, color, national origin, age, disability, sex, sexual orientation, or gender identity.            Thank you!     Thank you for choosing [facility]  for your care. Our goal is always to provide you with excellent care. Hearing back from our patients is one way we can continue to improve our services. Please take a few minutes to complete the written survey that you may receive in the mail after your  visit with us. Thank you!             Your Updated Medication List - Protect others around you: Learn how to safely use, store and throw away your medicines at www.disposemymeds.org.          This list is accurate as of 3/13/18 11:59 PM.  Always use your most recent med list.                   Brand Name Dispense Instructions for use Diagnosis    ammonium lactate 12 % cream    LAC-HYDRIN    385 g    Apply topically 2 times daily as needed for dry skin    Venous stasis, Type 2 diabetes, controlled, with neuropathy (H)       ascorbic acid 500 MG Tabs     30 tablet    Take 1 tablet (500 mg) by mouth 2 times daily    Ulcer of right lower leg, with fat layer exposed (H)       ASPIRIN PO      Take 81 mg by mouth daily        blood glucose monitoring lancets     3 Box    Use to test blood sugars 2 as directed.    Type 2 diabetes, uncontrolled, with neuropathy (H)       blood glucose monitoring test strip    ONETOUCH ULTRA    60 strip    Use to test blood sugars 2 times daily or as directed.    Type 2 diabetes mellitus (H)       clopidogrel 75 MG tablet    PLAVIX    30 tablet    Take 1 tablet (75 mg) by mouth daily    Peripheral vascular disease, unspecified       COLACE PO      Take 100 mg by mouth daily        ferrous sulfate 325 (65 FE) MG tablet    IRON    60 tablet    Take 1 tablet (325 mg) by mouth 2 times daily    Peripheral vascular disease, unspecified       gentamicin 0.1 % cream    GARAMYCIN    30 g    Apply topically daily To right leg ulcer.    Ulcer of right lower leg, with fat layer exposed (H), Chronic venous hypertension with ulcer involving right side (H), Type 2 diabetes, controlled, with neuropathy (H)       insulin glargine 100 UNIT/ML injection    LANTUS SOLOSTAR    15 mL    Inject 25 Units Subcutaneous At Bedtime    Type 2 diabetes mellitus with diabetic peripheral angiopathy without gangrene, with long-term current use of insulin (H)       insulin pen needle 31G X 8 MM    B-D U/F    100 each    Use 1  "daily o as directed    Diabetes mellitus, type II (H)       LISINOPRIL PO      Take 20 mg by mouth 2 times daily        nateglinide 120 MG tablet    STARLIX    90 tablet    TAKE 1 TABLET BY MOUTH THREE TIMES DAILY BEFORE MEALS    Type 2 diabetes, controlled, with neuropathy (H)       OPTIFOAM 6\"X6\" Pads     1 each    1 Box once a week    Ulcer of right leg, with fat layer exposed (H)       * order for DME     2 each    Please measure and distribute 1 pair of 20mm Hg - 30mm Hg knee high ULCER CARE open or closed toe compression stockings.  Patient has a size 13 foot and please take this into consideration.  Jobst or equivalent    Varicose veins of lower extremities with other complications, Venous stasis ulcer of right lower extremity (H)       * order for DME     3 each    Please measure and distribute 1 pair of 20mmHg - 30mmHg knee high open or closed toe compression stockings. Jobst ultrasheer or equivalent.    Varicose veins of both lower extremities with complications       * order for DME     2 each    Please measure and distribute 1 pair of 30mmHg - 40mmHg knee high open toe ulcercare compression stockings. Jobst ultrasheer or equivalent.    Varicose veins of bilateral lower extremities with other complications       sildenafil 50 MG tablet    VIAGRA    10 tablet    Take 1 tablet (50 mg) by mouth daily as needed for erectile dysfunction    Vasculogenic erectile dysfunction, unspecified vasculogenic erectile dysfunction type       silver sulfADIAZINE 1 % cream    SILVADENE    85 g    Apply topically daily To affected areas on right foot and leg.    Ulcer of right lower leg, with fat layer exposed (H), Chronic venous hypertension with ulcer involving right side (H), Type 2 diabetes, controlled, with neuropathy (H)       simvastatin 10 MG tablet    ZOCOR    90 tablet    Take 1 tablet (10 mg) by mouth At Bedtime    Type 2 diabetes, controlled, with neuropathy (H)       * sitagliptin 100 MG tablet    RAJEEVUVIA    90 " tablet    Take 1 tablet (100 mg) by mouth daily    Type 2 diabetes, controlled, with neuropathy (H)       * sitagliptin 100 MG tablet    JANUVIA    90 tablet    Take 1 tablet (100 mg) by mouth daily    Type 2 diabetes, HbA1c goal < 7% (H)       triamcinolone 0.1 % cream    KENALOG    30 g    Apply sparingly to left heel daily.    Dermatitis of left foot       VITAMIN C PO      Take 1,000 mg by mouth        VITAMIN D (CHOLECALCIFEROL) PO      Take 1,000 Units by mouth 2 times daily        * Notice:  This list has 5 medication(s) that are the same as other medications prescribed for you. Read the directions carefully, and ask your doctor or other care provider to review them with you.
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
## 2018-03-13 NOTE — PATIENT INSTRUCTIONS
Thanks for coming today.  Ortho/Sports Medicine Clinic  [address] [place] [address]    To schedule future appointments in Ortho Clinic, you may call [phone].    To schedule ordered imaging by your provider:   Call Central Imaging [phone]    To schedule an injection ordered by your provider:  Call Central Imaging Injection scheduling line: [phone]  Airpers[employer] available online at:  [email or web address]    Please call if any further questions or concerns ([phone]).  Clinic hours 8 am to 5 pm.    Return to clinic (call) if symptoms worsen or fail to improve.
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
## 2018-03-15 ENCOUNTER — HOSPITAL ENCOUNTER (OUTPATIENT)
Dept: CARDIAC REHAB | Facility: CLINIC | Age: 73
End: 2018-03-15
Attending: INTERNAL MEDICINE
Payer: MEDICARE

## 2018-03-15 VITALS — BODY MASS INDEX: 21.69 KG/M2 | WEIGHT: 169 LBS | HEIGHT: 74 IN

## 2018-03-15 DIAGNOSIS — I25.10 CORONARY ARTERY DISEASE DUE TO LIPID RICH PLAQUE: ICD-10-CM

## 2018-03-15 DIAGNOSIS — I25.83 CORONARY ARTERY DISEASE DUE TO LIPID RICH PLAQUE: ICD-10-CM

## 2018-03-15 PROCEDURE — 93798 PHYS/QHP OP CAR RHAB W/ECG: CPT

## 2018-03-15 PROCEDURE — 40000575 ZZH STATISTIC OP CARDIAC VISIT #2

## 2018-03-15 PROCEDURE — 93797 PHYS/QHP OP CAR RHAB WO ECG: CPT

## 2018-03-15 PROCEDURE — 40000116 ZZH STATISTIC OP CR VISIT

## 2018-03-15 ASSESSMENT — 6 MINUTE WALK TEST (6MWT)
PREDICTED: 2070.96
MALE CALC: 2058.41
TOTAL DISTANCE WALKED (FT): 1156
FEMALE CALC: 1584.79
GENDER SELECTION: MALE

## 2018-03-15 NOTE — PROGRESS NOTES
03/15/18 1300   Session   Session Initial Evaluation and Exercise Prescription   Certified through this date 04/13/18   Cardiac Rehab Assessment   Cardiac Rehab Assessment Amos Walker is a 72 year old gentleman with a history of PAD, and CAD presenting with two vessel CAD, and underwent coronary angiogram on 3/1 which demonstrated two vessel disease, however, d/t contrast load already given, and needed to wait until 3/8 for successful percutaneous coronary intervention of the left main, mid and proximal LAD, proximal and mid RCA.  The patient's history and clinical status including hemodynamics and ECG were evaluated. The patient was assessed to be stable and appropriate to begin exercise.   The patient's functional capacity and exercise prescription were determined by the completion of the 6 minute walk test. See results above. The patient was oriented to the program.  Risk factor profile was completed. Goals and objectives were discussed. CV response was WNL. No symptoms, complaints or pain were reported. Good prognosis for reaching goals below. Skilled therapy is necessary in order to monitor CV response to exercise, to provide education on risk factors and behavior change counseling needed to achieve patient's goals.  Plan to progress to 30-40 minutes of exercise prior to discharge from cardiac rehab.  Initial THR of 20-30 beats above RHR; Effort rating of 4-6. Initiate muscle conditioning as appropriate. Provide risk factor education and behavior change counseling.      General Information   Treatment Diagnosis Stent   Date of Treatment Diagnosis 03/08/18   Significant Past CV History PAD   Comorbidities PAD;DM   Other Medical History Past Medical History:   Diagnosis Date     Anemia      CKD (chronic kidney disease) stage 3, GFR 30-59 ml/min      Heart disease      HTN (hypertension)      Hyperlipidemia      MRSA cellulitis of right foot     in past.      PAD (peripheral artery disease) (H)     s/p  "stenting in R leg     Tobacco use     50+ pack     Type 2 diabetes mellitus (H)     for 25 yrs.  on insulin and starlix     Venous ulcer         Hospital [facility]   Hospital Discharge Date 03/08/18   Signs and Symptoms Post Hospital Discharge Fatigue   Outpatient Cardiac Rehab Start Date 03/15/18   Primary Physician Racheal Swift   Primary Physician Follow Up Completed   Cardiologist Dr. Chinchilla   Ejection Fraction 51%   Risk Stratification Low   Summary of Cath Report   Summary of Cath Report Available   Date Performed 03/01/18   Left Main 50-60%   LAD a long 50-60% proximal LAD stenosis, long 60% mLAD stenosis    LCX Prox 25-50%, mLCX 25-50, pLCX 50%   RCA pRCA 50-60% & mRCA 70%   Cath Report Comments Two vessel coronary artery disease with left main involvement.   Living and Work Status    Living Arrangements and Social Status [employer] Live with an adult   Return to Employment Retired   Occupation    Preventative Medications   CMS recommended medications Ace inhibitors;Antiplatelets;Lipid Lowering;Influenza vaccination;Pneumonia vaccination   Fall Risk Screen   Fall screen completed by Cardiac Rehab   Have you fallen 2 or more times in the past year? Yes   Have you fallen and had an injury in the past year? Yes  (Pt broke hip in 8/17)   Fall screen comments Pt was seen my physical therapy in the fall of 2017   Pain   Patient Currently in Pain No   Physical Assessments   Incisions WNL   Edema None   Right Lung Sounds not assessed   Left Lung Sounds not assessed   Limitations Orthopedic   Comments Pt had hip surgery in 8/217   Individualized Treatment Plan   Monitored Sessions Scheduled 24   Monitored Sessions Attended 1   Oxygen   Supplemental Oxygen needed No   Nutrition Management - Weight Management   Assessment Initial Assessment   Age 72   Weight 76.7 kg (169 lb)   Height 1.88 m (6' 2.02\")   BMI (Calculated) 21.73   Initial Rate Your Plate " Score. Dietary tool to assess eating patterns. Scores range from 24 to 72. The higher the score the healthier the eating pattern. 64   Nutrition Management - Lipids   Lipids Labs Available   Date 10/25/17   Total Cholesterol 92   Triglycerides 100   HDL 41   LDL 30   Prescribed Lipid Medication Yes   Statin Intensity Low Intensity   Nutrition Management - Diabetes   Diabetes Type II   Do you Monitor BS at Home? Other (see comments)  (Pt has started to check check BS more regular)   Diabetes Medication Prescribed Yes, Insulin;Yes, Oral Medication   Hb A1C Date: 10/25/17   Hb A1C Result: 6.5   Nutrition Management Summary   Dietary Recommendations Low Fat;Low Cholesterol;Low Sodium   Stages of Change for Diet Compliance Preparation   Interventions Planned Attend Nutrition Education Class(es)   Psychosocial Management   Psychosocial Assessment Initial   Is there history of clinical depression or increased risk of depression? No previous history   Current Level of Stress per Patient Report Moderate    Current Coping Skills Other (see comments)  (Prayer and smoking)   Initial Patient Health Questionnaire -9 Score (PHQ-9) for depression. 5-9 Minimal symptoms, 10-14 Minor depression, 15-19 Major depression, moderately severe, > 20 Major depression, severe  3   Initial [facility] Survey score.  Quality of Life:   If total score > 25 review individual areas where patient rated a 4 or 5.  Consider patients current medical condition and what role that plays on the score.   Adjust treatment protocol to improve areas of concern.  Consider the following:  PHQ9 score, DASI, and re-assessment within the next 30 days to assist with developing treatments.  21   Stages of Change Preparation   Interventions Planned Patient denies need for intervention at this time.   Other Core Components - Hypertension   History of or Diagnosis of Hypertension Yes   Currently taking Anti-Hypertensives Yes;Ace Inhibitor   Other Core Components -  Tobacco   History of Tobacco Use Yes   Tobacco Use Status Currently smoking - everyday   Tobacco Habit Cigarettes   Tobacco Use per Day (average) (<0.5ppd)   Years of Tobacco Use 55   Stages of Change Contemplation   Other Core Components Summary   Interventions Planned Educate on benefits of smoking cessation   Activity/Exercise History   Activity/Exercise Assessment Initial   Activity/Exercise Status prior to event? Sedentary   Number of Days Currently participating in Moderate Physical Activity? 2   Number of Days Currently performing  Aerobic Exercise (including rehab)? 0   Number of Minutes per Session Currently of Aerobic Exercise (average)? 0   Current Stage of Change (Physical Activity) Preparation   Current Stage of Change (Aerobic Exercise) Preparation   Patient Goals Goal #1;Goal #2   Goal #1 Description Pt will increae his strength and endurance to be able to walk 10 minutes without a cane   Goal #1 Target Date 04/29/18   Goal #1 Progress Towards Goal Pt will have the confidence to be able to resume walking 40 minutes 3-5x per week.   Goal #2 Target Date 05/14/18   Activity/Exercise Comments Pt was walking 40 minutes a day 4-5x a week up until he broke his hip in August   Exercise Assessment   6 Minute Walk Predicted - Gender Selection Male   6 Minute Walk Predicted (Male) 2058.41   Initial 6 Minute Walk Distance (Feet) 1156 ft   Resting HR 93 bpm   Exercise  bpm   Post Exercise HR 94 bpm   Resting /58   Exercise /46   Post Exercise /56   Pre  mg/dL   Post  mg/dL   Effort Rating 5   Current MET Level 2.7   MET Level Goal 5+   ECG Rhythm Normal sinus rhythm   Ectopy None   Current Symptoms Denies symptoms   Limitations/Restrictions None   Exercise Prescription   Mode Treadmill;Airdyne;Nustep;Weights   Duration/Time 15-30 min   Frequency 3 daysweek   THR (85% of age predicted max HR) 127.5   OMNI Effort Rating (0-10 Scale) 4-6/10   Progression Continuous bouts;Total  exercise time of 20-30 minutes;Aerobic exercise to OMNI rating of 5-7, and heart rate at or below target;Progress peak intensity by 1/2 MET per week   Recommended Home Exercise   Type of Exercise Walking   Frequency (days per week) daily   Duration (minutes per session) 15-30 min   Effort Rating Recommended 4-6/10   Current Home Exercise   Type of Exercise None   Follow-up/On-going Support   Provider follow-up needed on the following No follow-up needed   Learning Assessment   Learner Patient   Primary Language English   Preferred Learning Style Reading;Pictures/Video   Barriers to Learning Hearing   Patient Education   Education recommended Anatomy and Physiology of the Heart;Exercise Principles;Medication Overview;Nutrition;Risk Factors;Smoking Cessation   Physician cosignature/electronic signature indicates agreements with the ITP document and approval of discharge.

## 2018-03-19 ENCOUNTER — OFFICE VISIT (OUTPATIENT)
Dept: INTERNAL MEDICINE | Facility: CLINIC | Age: 73
End: 2018-03-19
Payer: MEDICARE

## 2018-03-19 ENCOUNTER — HOSPITAL ENCOUNTER (OUTPATIENT)
Dept: CARDIAC REHAB | Facility: CLINIC | Age: 73
End: 2018-03-19
Attending: INTERNAL MEDICINE
Payer: MEDICARE

## 2018-03-19 ENCOUNTER — TELEPHONE (OUTPATIENT)
Dept: GASTROENTEROLOGY | Facility: CLINIC | Age: 73
End: 2018-03-19

## 2018-03-19 VITALS
HEART RATE: 85 BPM | SYSTOLIC BLOOD PRESSURE: 115 MMHG | OXYGEN SATURATION: 100 % | WEIGHT: 170 LBS | DIASTOLIC BLOOD PRESSURE: 64 MMHG | BODY MASS INDEX: 21.82 KG/M2

## 2018-03-19 DIAGNOSIS — E11.51 TYPE 2 DIABETES MELLITUS WITH DIABETIC PERIPHERAL ANGIOPATHY WITHOUT GANGRENE, WITH LONG-TERM CURRENT USE OF INSULIN (H): ICD-10-CM

## 2018-03-19 DIAGNOSIS — Z79.4 TYPE 2 DIABETES MELLITUS WITH DIABETIC PERIPHERAL ANGIOPATHY WITHOUT GANGRENE, WITH LONG-TERM CURRENT USE OF INSULIN (H): ICD-10-CM

## 2018-03-19 DIAGNOSIS — R19.5 POSITIVE FIT (FECAL IMMUNOCHEMICAL TEST): Primary | ICD-10-CM

## 2018-03-19 PROCEDURE — 40000116 ZZH STATISTIC OP CR VISIT

## 2018-03-19 PROCEDURE — 93798 PHYS/QHP OP CAR RHAB W/ECG: CPT

## 2018-03-19 ASSESSMENT — PAIN SCALES - GENERAL: PAINLEVEL: NO PAIN (0)

## 2018-03-19 NOTE — PATIENT INSTRUCTIONS
[facility]: [phone]     [facility] Medication Refill Request Information:  * Please contact your pharmacy regarding ANY request for medication refills.  ** [facility] Prescription Fax = [phone]  * Please allow 3 business days for routine medication refills.  * Please allow 5 business days for controlled substance medication refills.     [facility] Test Result notification information:  *You will be notified with in 7-10 days of your appointment day regarding the results of your test.  If you are on MyChart you will be notified as soon as the provider has reviewed the results and signed off on them.      Try reducing lisinopril to 20 mg once daily, if BP rises above 130/80, resume 20 mg twice daily.  Try reducing lantus to 23 units.    Prior to colonoscopy:   Day prior to prep: Take only 15 units of lantus on day before prep.  Day of Prep: Since eating much less or nothing, skip all oral medications.    Day before colonoscopy: Take only 12 units of insulin. It's okay to be a little high for 2-3 days.  You will resume your normal schedule after the procedure.  Continue aspirin and plavix.

## 2018-03-19 NOTE — PROGRESS NOTES
[facility]  Primary Care Center   Racheal Swift MD  03/19/2018      Chief Complaint:   Results (pt here to discuss test results)       History of Present Illness:   Amos Walker is a 72 year old male with a history of tobacco use, diabetes mellitus, hypertension, chronic kidney disease, peripheral artery disease, and chronic leg ulcers who presents for follow up regarding an abnormal FIT colon test. He has not noted any major changes in his stool  He usually has constipation with hard round stool, this morning he saw no blood and had a more normal bowel movement.  He does reports that when he began taking aspirin 10 years ago blood was detected in his urine or stool (pt does not know which) and his care team did a work up which was unremarkable. He is overdue for a colonoscopy but has had two previous.  He denies blood in the stools or abdominal pain.  He does note 10-15 lbs of weight loss however.    He had a coronary angiogram and stents placed on 3/8 in left main coronary artery and left anterior descending coronary artery. After angioplasty, he began a one year course of Plavix and lifelong aspirin, along with cardiac rehab. He does report recent hypoglycemia. On his way to rehabilitation he ate a peanut butter and jelly sandwich and milk before the treadmill. His blood sugar dropped to 66 mg/dL after walking for 15 minutes. He denies feeling blood sugar decrease during this event. He normally takes Januvia and starlix in the morning. His breakfasts are normally larger than a sandwich. He agrees to changing his insulin dosage to 23 units, but would like to have the ability to go back to 25 if needed. He feels he could improve his diabetes management. He normally has two meals and admits he does not often check his blood glucose, eat 3 meals a day with exercise in between.     His blood pressure has been lower as of late. He currently takes 20 mg of lisinopril 2x per day.    Health Care  Maintenance/Other:  1. He does not have a timeline for his leg surgery.      Review of Systems:   A full 10-pt Review of Systems was performed, verified and is negative except as documented in the HPI.  All health questionnaires were reviewed, verified and relevant information documented above.     Active Medications:     Current Outpatient Prescriptions:      sitagliptin (JANUVIA) 100 MG tablet, Take 1 tablet (100 mg) by mouth daily, Disp: 90 tablet, Rfl: 1     triamcinolone (KENALOG) 0.1 % cream, Apply sparingly to left heel daily., Disp: 30 g, Rfl: 1     Ascorbic Acid (VITAMIN C PO), Take 1,000 mg by mouth, Disp: , Rfl:      silver sulfADIAZINE (SILVADENE) 1 % cream, Apply topically daily To affected areas on right foot and leg., Disp: 85 g, Rfl: 5     VITAMIN D, CHOLECALCIFEROL, PO, Take 1,000 Units by mouth 2 times daily, Disp: , Rfl:      ASPIRIN PO, Take 81 mg by mouth daily, Disp: , Rfl:      LISINOPRIL PO, Take 20 mg by mouth 2 times daily, Disp: , Rfl:      insulin glargine (LANTUS SOLOSTAR) 100 UNIT/ML injection, Inject 25 Units Subcutaneous At Bedtime (Patient taking differently: Inject 25 Units Subcutaneous daily (with dinner) ), Disp: 15 mL, Rfl: 2     Docusate Sodium (COLACE PO), Take 100 mg by mouth daily , Disp: , Rfl:      nateglinide (STARLIX) 120 MG tablet, TAKE 1 TABLET BY MOUTH THREE TIMES DAILY BEFORE MEALS, Disp: 90 tablet, Rfl: 11     sitagliptin (JANUVIA) 100 MG tablet, Take 1 tablet (100 mg) by mouth daily, Disp: 90 tablet, Rfl: 3     simvastatin (ZOCOR) 10 MG tablet, Take 1 tablet (10 mg) by mouth At Bedtime, Disp: 90 tablet, Rfl: 3     ammonium lactate (LAC-HYDRIN) 12 % cream, Apply topically 2 times daily as needed for dry skin, Disp: 385 g, Rfl: 3     gentamicin (GARAMYCIN) 0.1 % cream, Apply topically daily To right leg ulcer. (Patient taking differently: Apply topically daily as needed To right leg ulcer.), Disp: 30 g, Rfl: 5     ferrous sulfate (IRON) 325 (65 FE) MG tablet, Take 1  tablet (325 mg) by mouth 2 times daily, Disp: 60 tablet, Rfl: 11     clopidogrel (PLAVIX) 75 MG tablet, Take 1 tablet (75 mg) by mouth daily (Patient taking differently: Take 75 mg by mouth every evening ), Disp: 30 tablet, Rfl: 11     ascorbic acid 500 MG TABS, Take 1 tablet (500 mg) by mouth 2 times daily, Disp: 30 tablet, Rfl:      insulin pen needle (B-D U/F) 31G X 8 MM, Use 1 daily o as directed, Disp: 100 each, Rfl: 3     sildenafil (VIAGRA) 50 MG tablet, Take 1 tablet (50 mg) by mouth daily as needed for erectile dysfunction, Disp: 10 tablet, Rfl: 11     Allergies:   Lisinopril; Neomycin; Methylchloroisothiazolinone [methylisothiazolinone]; and Povidone iodine      Past Medical History:  Anemia  CKD (chronic kidney disease) stage 3, GFR 30-59 ml/min  HTN (hypertension)  Hyperlipidemia  MRSA cellulitis of right foot  PAD (peripheral artery disease)   PMH of tobacco use  Type 2 diabetes mellitus  Chronic venous ulcer      Past Surgical History:  Orthopedic surgery x2  Vascular surgery   Arthroplasty hip     Family History:   Father: Colon cancer in his 70s treated successfully with a one year course of chemotherapy.   Kidney disease   Myocardial infarction   Macular degeneration       Social History:   The patient is , a current everyday smoker of 0.50 packs per day, and does not consume alcohol. The patient is currently residing in a nursing home.        Physical Exam:   /64  Pulse 85  Wt 77.1 kg (170 lb)  SpO2 100%  BMI 21.82 kg/m2   Constitutional: Alert, oriented, pleasant, no acute distress  Head: Normocephalic, atraumatic  Eyes: Extra-ocular movements intact, no scleral icterus  Cardiovascular: Regular rate and rhythm, no murmurs, rubs or gallops, peripheral pulses full/symmetric  Respiratory: Good air movement bilaterally, lungs clear, no wheezes/rales/rhonchi  GI: Abdomen soft, bowel sounds present, nondistended, nontender  Musculoskeletal: No edema, normal muscle tone, normal  gait  Neurologic: Alert and oriented, cranial nerves 2-12 intact.  Psychiatric: normal mentation, affect and mood      Labs  Component      Latest Ref Rng & Units 3/5/2018   Occult Blood Scn FIT      NEG:Negative Positive (A)     Component      Latest Ref Rng & Units 3/8/2018 3/8/2018 3/8/2018           9:20 AM  9:26 AM  5:24 PM   Sodium      133 - 144 mmol/L 134     Potassium      3.4 - 5.3 mmol/L 5.1     Chloride      94 - 109 mmol/L 104     Carbon Dioxide      20 - 32 mmol/L 22     Anion Gap      3 - 14 mmol/L 8     Glucose      70 - 99 mg/dL 124 (H) 138 (H) 80   Urea Nitrogen      7 - 30 mg/dL 26     Creatinine      0.66 - 1.25 mg/dL 1.10     GFR Estimate      >60 mL/min/1.7m2 66     GFR Estimate If Black      >60 mL/min/1.7m2 80     Calcium      8.5 - 10.1 mg/dL 7.8 (L)     WBC      4.0 - 11.0 10e9/L 5.2     RBC Count      4.4 - 5.9 10e12/L 2.98 (L)     Hemoglobin      13.3 - 17.7 g/dL 8.9 (L)     Hematocrit      40.0 - 53.0 % 28.3 (L)     MCV      78 - 100 fl 95     MCH      26.5 - 33.0 pg 29.9     MCHC      31.5 - 36.5 g/dL 31.4 (L)     RDW      10.0 - 15.0 % 14.5     Platelet Count      150 - 450 10e9/L 235       Component      Latest Ref Rng & Units 3/8/2018 3/8/2018 3/8/2018 3/8/2018          10:39 AM 10:59 AM 11:15 AM 11:35 AM   Activated Clot Time      75 - 150 sec 298 (H) 241 (H) 249 (H) 294 (H)     Component      Latest Ref Rng & Units 3/8/2018 3/8/2018           2:20 PM  3:48 PM   Activated Clot Time      75 - 150 sec 200 (H) 172 (H)     Assessment and Plan:  Type 2 diabetes mellitus with diabetic peripheral angiopathy without gangrene, with long-term current use of insulin (H)  Reduce insulin to avoid hypoglycemia during cardiac rehab. Pt very reluctant to do so.  - insulin glargine (LANTUS SOLOSTAR) 100 UNIT/ML injection  Dispense: 3 mL; Refill: 3    Positive FIT (fecal immunochemical test)  Of note, has new anemia and 10-15 lb weight loss over last several months, as well as family history of  colorectal cancer. Needs to have diagnostic colonoscopy, however this is complicated by his recent cardiac stent placement and treatment with Plavix for a year. Will discuss with cardiology and GI.  - GASTROENTEROLOGY ADULT REF PROCEDURE ONLY     Hypertension  Discussesed trial of reducing lisinopril to 20 mg a day to avoid hypotension.    Routine Health Maintenance  Immunizations (zoster, pneumovax, flu, Tdap, Hep A/B):   Most Recent Immunizations   Administered Date(s) Administered     Influenza (High Dose) 3 valent vaccine 10/10/2017     Influenza (IIV3) PF 11/01/2013     Pneumo Conj 13-V (2010&after) 11/03/2014     Pneumococcal 23 valent 12/21/2015     TDAP Vaccine (Boostrix) 03/21/2016     Lipids:   Recent Labs   Lab Test  10/25/17   1309  10/31/16   1205   11/18/14   0853   CHOL  92  123   < >  110   HDL  41  41   < >  45   LDL  30  67   < >  45   TRIG  100  74   < >  100   CHOLHDLRATIO   --    --    --   2.4    < > = values in this interval not displayed.     PSA (50-75 yrs): No results found for: PSA   AAA Screening (65-75 yrs): 12/16 normal  Lung Ca Screening (>30 pk age 55-79 or >20 py age 50-79 + RF): 12/16, discussed and rec annual screening  Colonoscopy (50-75 yrs): Discussed scheduling one due to positive FIT test  Dexa (>65W or 70M yrs): consider future  Advanced Directive: not discussed     Follow-up: Return in about 6 weeks (around 4/30/2018).         Scribe Disclosure:   I, Luis Alfredo Cosme, am serving as a scribe to document services personally performed by Racheal Swift MD at this visit, based upon the provider's statements to me. All documentation has been reviewed by the aforementioned provider prior to being entered into the official medical record.     Portions of this medical record were completed by a scribe. UPON MY REVIEW AND AUTHENTICATION BY ELECTRONIC SIGNATURE, this confirms (a) I performed the applicable clinical services, and (b) the record is accurate.   Racheal Swift  MD  Internal Medicine    25 min spent face to face, of which >50% time spent on counseling/coordinating care exclusive of any procedure time

## 2018-03-19 NOTE — TELEPHONE ENCOUNTER
Received request from Dr. Swift to schedule patient for colonoscopy. Patient recently had cardiac stents placed on 3/8/2018 and he is on Plavix. He will contact cardiologist to obtain clearance for procedure and will call back with update.    Racheal Mccarthy RN

## 2018-03-19 NOTE — MR AVS SNAPSHOT
After Visit Summary   3/19/2018    Amos Walker    MRN: [id number]           Patient Information     Date Of Birth          1945        Visit Information        Provider Department      3/19/2018 11:00 AM Racheal Swift MD [facility]        Today's Diagnoses     Positive FIT (fecal immunochemical test)    -  1    Type 2 diabetes mellitus with diabetic peripheral angiopathy without gangrene, with long-term current use of insulin (H)          Care Instructions    Primary Care Center: [phone]     Primary Care Center Medication Refill Request Information:  * Please contact your pharmacy regarding ANY request for medication refills.  ** [facility] Prescription Fax = [phone]  * Please allow 3 business days for routine medication refills.  * Please allow 5 business days for controlled substance medication refills.     Primary Care Center Test Result notification information:  *You will be notified with in 7-10 days of your appointment day regarding the results of your test.  If you are on MyChart you will be notified as soon as the provider has reviewed the results and signed off on them.      Try reducing lisinopril to 20 mg once daily, if BP rises above 130/80, resume 20 mg twice daily.  Try reducing lantus to 23 units.    Prior to colonoscopy:   Day prior to prep: Take only 15 units of lantus on day before prep.  Day of Prep: Since eating much less or nothing, skip all oral medications.    Day before colonoscopy: Take only 12 units of insulin. It's okay to be a little high for 2-3 days.  You will resume your normal schedule after the procedure.  Continue aspirin and plavix.          Follow-ups after your visit        Additional Services     GASTROENTEROLOGY ADULT REF PROCEDURE ONLY       Last Lab Result: Creatinine (mg/dL)       Date                     Value                 03/08/2018               1.10             ----------  Body mass index is 21.82  kg/(m^2).     Needed:  No  Language:  English    Patient will be contacted to schedule procedure.     Please be aware that coverage of these services is subject to the terms and limitations of your health insurance plan.  Call member services at your health plan with any benefit or coverage questions.  Any procedures must be performed at a [place] facility OR coordinated by your clinic's referral office.    Please bring the following with you to your appointment:    (1) Any X-Rays, CTs or MRIs which have been performed.  Contact the facility where they were done to arrange for  prior to your scheduled appointment.    (2) List of current medications   (3) This referral request   (4) Any documents/labs given to you for this referral                  Follow-up notes from your care team     Return in about 6 weeks (around 4/30/2018).      Your next 10 appointments already scheduled     Mar 20, 2018 10:00 AM CDT   [facility] Brayan Mcclain DPM   [facility]    [address] [place] [address]   [phone]            Mar 22, 2018  4:30 PM CDT   Cardiac Treatment with  Cardiac Rehab 1   [facility] [place] ([facility])    [address]  [facility] [address]   [phone]            Mar 27, 2018  9:30 AM CDT   Return Visit with Brayan Mcclain DPM   [facility]    [address] [facility] [address]   [phone]            Apr 03, 2018  9:00 AM CDT   Return Visit with Brayan Mcclain DPM   [facility]    [address] [facility] [address]   [phone]            Apr 10, 2018  9:30 AM CDT   Return Visit with Brayan Mcclain DPM   [facility]    [address]  [facility] [address]   [phone]            Apr 17, 2018 10:30 AM CDT   Return Visit with Brayan Mcclain DPM   [facility] ([facility])    [address] [place] [address]   [phone]            Apr 24, 2018  9:30 AM CDT   Return Visit with Brayan Mcclain DPM   [facility] ([facility])    [address] [place] [address]   [phone]            Apr 27, 2018 10:25 AM CDT   (Arrive by 10:10 AM)   Return Visit with Racheal Swift MD   [facility] ([facility])    [address] [place] [id number] [phone]            May 01, 2018  9:30 AM CDT   Return Visit with Brayan Mcclain DPM   [facility] ([facility])    [address] [place] [address]   [phone]            May 08, 2018  9:30 AM CDT   Return Visit with Brayan Mcclain DPM   [facility])    [address] [place] [address]   [phone]              Who to contact     Please call your clinic at [phone] to:    Ask questions about your health    Make or cancel appointments    Discuss your medicines    Learn about your test results    Speak to your doctor            Additional Information About Your Visit        [email or web address] Information     [email or web address] gives you secure access to your electronic health record. If you see a primary care provider, you can also send messages to your care team and make appointments. If you have questions, please call your primary care clinic.  If you do not have a primary care provider, please call [phone] and they will assist you.      [employer] is an electronic gateway that provides easy, online access to your medical records. With [employer], you can request a clinic appointment, read your test results, renew a prescription  or communicate with your care team.     To access your existing account, please contact your [facility] or call [phone] for assistance.        Care EveryWhere ID     This is your Care EveryWhere ID. This could be used by other organizations to access your [place] medical records  [id number]        Your Vitals Were     Pulse Pulse Oximetry BMI (Body Mass Index)             85 100% 21.82 kg/m2          Blood Pressure from Last 3 Encounters:   03/19/18 115/64   03/13/18 103/58   03/08/18 115/52    Weight from Last 3 Encounters:   03/19/18 77.1 kg (170 lb)   03/15/18 76.7 kg (169 lb)   03/08/18 77.6 kg (171 lb)              We Performed the Following     GASTROENTEROLOGY ADULT REF PROCEDURE ONLY          Today's Medication Changes          These changes are accurate as of 3/19/18 11:46 AM.  If you have any questions, ask your nurse or doctor.               These medicines have changed or have updated prescriptions.        Dose/Directions    clopidogrel 75 MG tablet   Commonly known as:  PLAVIX   This may have changed:  when to take this   Used for:  Peripheral vascular disease, unspecified        Dose:  75 mg   Take 1 tablet (75 mg) by mouth daily   Quantity:  30 tablet   Refills:  11       gentamicin 0.1 % cream   Commonly known as:  GARAMYCIN   This may have changed:    - when to take this  - reasons to take this  - additional instructions   Used for:  Ulcer of right lower leg, with fat layer exposed (H), Chronic venous hypertension with ulcer involving right side (H), Type 2 diabetes, controlled, with neuropathy (H)        Apply topically daily To right leg ulcer.   Quantity:  30 g   Refills:  5       insulin glargine 100 UNIT/ML injection   Commonly known as:  LANTUS SOLOSTAR   This may have changed:    - how much to take  - when to take this  - additional instructions   Used for:  Type 2 diabetes mellitus with diabetic peripheral angiopathy without gangrene, with long-term  current use of insulin (H)   Changed by:  Racheal Swift MD        Dose:  23 Units   Inject 23 Units Subcutaneous daily (with dinner) May take up to 25 units daily   Quantity:  3 mL   Refills:  3            Where to get your medicines      These medications were sent to [facility] - [place] [id number] AT [facility] [address], [facility] [address]     Phone:  [phone]     insulin glargine 100 UNIT/ML injection                Primary Care Provider Office Phone # Fax #    Racheal Swift [phone]       [address] [address]        Equal Access to Services     SAMSON MELTON AH: Hadii aad ku hadasho Somaameali, waaxda luqadaha, qaybta kaalmada adeegyada, waxay geniin hayhollie lopez. So [facility] [phone].    ATENCIÓN: Si habla español, tiene a rodrigues disposición servicios gratuitos de asistencia lingüística. [facility] [phone].    We comply with applicable federal civil rights laws and [place] laws. We do not discriminate on the basis of race, color, national origin, age, disability, sex, sexual orientation, or gender identity.            Thank you!     Thank you for choosing [facility]  for your care. Our goal is always to provide you with excellent care. Hearing back from our patients is one way we can continue to improve our services. Please take a few minutes to complete the written survey that you may receive in the mail after your visit with us. Thank you!             Your Updated Medication List - Protect others around you: Learn how to safely use, store and throw away your medicines at www.disposemymeds.org.          This list is accurate as of 3/19/18 11:46 AM.  Always use your most recent med list.                   Brand Name Dispense Instructions for use Diagnosis    ammonium lactate 12 % cream    LAC-HYDRIN    385 g    Apply topically 2 times daily as needed for dry  "skin    Venous stasis, Type 2 diabetes, controlled, with neuropathy (H)       ascorbic acid 500 MG Tabs     30 tablet    Take 1 tablet (500 mg) by mouth 2 times daily    Ulcer of right lower leg, with fat layer exposed (H)       ASPIRIN PO      Take 81 mg by mouth daily        blood glucose monitoring lancets     3 Box    Use to test blood sugars 2 as directed.    Type 2 diabetes, uncontrolled, with neuropathy (H)       blood glucose monitoring test strip    ONETOUCH ULTRA    60 strip    Use to test blood sugars 2 times daily or as directed.    Type 2 diabetes mellitus (H)       clopidogrel 75 MG tablet    PLAVIX    30 tablet    Take 1 tablet (75 mg) by mouth daily    Peripheral vascular disease, unspecified       COLACE PO      Take 100 mg by mouth daily        ferrous sulfate 325 (65 FE) MG tablet    IRON    60 tablet    Take 1 tablet (325 mg) by mouth 2 times daily    Peripheral vascular disease, unspecified       gentamicin 0.1 % cream    GARAMYCIN    30 g    Apply topically daily To right leg ulcer.    Ulcer of right lower leg, with fat layer exposed (H), Chronic venous hypertension with ulcer involving right side (H), Type 2 diabetes, controlled, with neuropathy (H)       insulin glargine 100 UNIT/ML injection    LANTUS SOLOSTAR    3 mL    Inject 23 Units Subcutaneous daily (with dinner) May take up to 25 units daily    Type 2 diabetes mellitus with diabetic peripheral angiopathy without gangrene, with long-term current use of insulin (H)       insulin pen needle 31G X 8 MM    B-D U/F    100 each    Use 1 daily o as directed    Diabetes mellitus, type II (H)       LISINOPRIL PO      Take 20 mg by mouth 2 times daily        nateglinide 120 MG tablet    STARLIX    90 tablet    TAKE 1 TABLET BY MOUTH THREE TIMES DAILY BEFORE MEALS    Type 2 diabetes, controlled, with neuropathy (H)       OPTIFOAM 6\"X6\" Pads     1 each    1 Box once a week    Ulcer of right leg, with fat layer exposed (H)       * order for DME     " 2 each    Please measure and distribute 1 pair of 20mm Hg - 30mm Hg knee high ULCER CARE open or closed toe compression stockings.  Patient has a size 13 foot and please take this into consideration.  Jobst or equivalent    Varicose veins of lower extremities with other complications, Venous stasis ulcer of right lower extremity (H)       * order for DME     3 each    Please measure and distribute 1 pair of 20mmHg - 30mmHg knee high open or closed toe compression stockings. Jobst ultrasheer or equivalent.    Varicose veins of both lower extremities with complications       * order for DME     2 each    Please measure and distribute 1 pair of 30mmHg - 40mmHg knee high open toe ulcercare compression stockings. Jobst ultrasheer or equivalent.    Varicose veins of bilateral lower extremities with other complications       sildenafil 50 MG tablet    VIAGRA    10 tablet    Take 1 tablet (50 mg) by mouth daily as needed for erectile dysfunction    Vasculogenic erectile dysfunction, unspecified vasculogenic erectile dysfunction type       silver sulfADIAZINE 1 % cream    SILVADENE    85 g    Apply topically daily To affected areas on right foot and leg.    Ulcer of right lower leg, with fat layer exposed (H), Chronic venous hypertension with ulcer involving right side (H), Type 2 diabetes, controlled, with neuropathy (H)       simvastatin 10 MG tablet    ZOCOR    90 tablet    Take 1 tablet (10 mg) by mouth At Bedtime    Type 2 diabetes, controlled, with neuropathy (H)       * sitagliptin 100 MG tablet    JANUVIA    90 tablet    Take 1 tablet (100 mg) by mouth daily    Type 2 diabetes, controlled, with neuropathy (H)       * sitagliptin 100 MG tablet    JANUVIA    90 tablet    Take 1 tablet (100 mg) by mouth daily    Type 2 diabetes, HbA1c goal < 7% (H)       triamcinolone 0.1 % cream    KENALOG    30 g    Apply sparingly to left heel daily.    Dermatitis of left foot       VITAMIN C PO      Take 1,000 mg by mouth         VITAMIN D (CHOLECALCIFEROL) PO      Take 1,000 Units by mouth 2 times daily        * Notice:  This list has 5 medication(s) that are the same as other medications prescribed for you. Read the directions carefully, and ask your doctor or other care provider to review them with you.

## 2018-03-19 NOTE — NURSING NOTE
Chief Complaint   Patient presents with     Results     pt here to discuss test results     Annel Das CMA at 11:06 AM on 3/19/2018.

## 2018-03-20 ENCOUNTER — OFFICE VISIT (OUTPATIENT)
Dept: PODIATRY | Facility: CLINIC | Age: 73
End: 2018-03-20
Payer: MEDICARE

## 2018-03-20 VITALS — HEART RATE: 92 BPM | DIASTOLIC BLOOD PRESSURE: 52 MMHG | SYSTOLIC BLOOD PRESSURE: 120 MMHG | OXYGEN SATURATION: 98 %

## 2018-03-20 DIAGNOSIS — E11.49 TYPE II OR UNSPECIFIED TYPE DIABETES MELLITUS WITH NEUROLOGICAL MANIFESTATIONS, NOT STATED AS UNCONTROLLED(250.60) (H): ICD-10-CM

## 2018-03-20 DIAGNOSIS — L97.512 SKIN ULCER OF RIGHT FOOT WITH FAT LAYER EXPOSED (H): ICD-10-CM

## 2018-03-20 DIAGNOSIS — E11.51 DIABETES MELLITUS WITH PERIPHERAL VASCULAR DISEASE (H): ICD-10-CM

## 2018-03-20 DIAGNOSIS — L97.912 ULCER OF RIGHT LOWER EXTREMITY WITH FAT LAYER EXPOSED (H): Primary | ICD-10-CM

## 2018-03-20 DIAGNOSIS — L97.521 SKIN ULCER OF LEFT FOOT, LIMITED TO BREAKDOWN OF SKIN (H): ICD-10-CM

## 2018-03-20 PROCEDURE — 99213 OFFICE O/P EST LOW 20 MIN: CPT | Performed by: PODIATRIST

## 2018-03-20 ASSESSMENT — PAIN SCALES - GENERAL: PAINLEVEL: NO PAIN (0)
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
## 2018-03-20 NOTE — PATIENT INSTRUCTIONS
Thanks for coming today.  [facility]  [address] [place] [address]    To schedule future appointments in Ortho Clinic, you may call [phone].    To schedule ordered imaging by your provider:   Call [facility] [phone]    To schedule an injection ordered by your provider:  Call [facility] Injection scheduling line: [phone]  [email or web address] available online at:  [email or web address]    Please call if any further questions or concerns ([phone]).  Clinic hours 8 am to 5 pm.    Return to clinic (call) if symptoms worsen or fail to improve.
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
## 2018-03-20 NOTE — NURSING NOTE
"Amos Walker's goals for this visit include: Recheck leg ulcer  He requests these members of his care team be copied on today's visit information: yes    PCP: Racheal Swift    Referring Provider:  Referred Self, MD  No address on file    Chief Complaint   Patient presents with     RECHECK     Right leg ulcer       Initial /52  Pulse 92  SpO2 98% Estimated body mass index is 21.82 kg/(m^2) as calculated from the following:    Height as of 3/15/18: 1.88 m (6' 2.02\").    Weight as of 3/19/18: 77.1 kg (170 lb).  Medication Reconciliation: complete    "

## 2018-03-20 NOTE — MR AVS SNAPSHOT
After Visit Summary   3/20/2018    Amos Walker    MRN: [id number]           Patient Information     Date Of Birth          1945        Visit Information        Provider Department      3/20/2018 10:00 AM Brayan Mcclain DPM [facility]        Today's Diagnoses     Ulcer of right lower extremity with fat layer exposed (H)    -  1    Skin ulcer of right foot with fat layer exposed (H)        Skin ulcer of left foot, limited to breakdown of skin (H)        Diabetes mellitus with peripheral vascular disease (H)        Type II or unspecified type diabetes mellitus with neurological manifestations, not stated as uncontrolled(250.60) (H)          Care Instructions    Thanks for coming today.  [facility]  [address] [place] [id number]    To schedule future appointments in Ortho Clinic, you may call [phone].    To schedule ordered imaging by your provider:   Call Central Imaging [phone]    To schedule an injection ordered by your provider:  Call Central Imaging Injection scheduling line: [phone]  Wil[employer] Axon available online at:  Snatch that [email or web address]    Please call if any further questions or concerns ([phone]).  Clinic hours 8 am to 5 pm.    Return to clinic (call) if symptoms worsen or fail to improve.            Follow-ups after your visit        Your next 10 appointments already scheduled     Mar 22, 2018  4:30 PM CDT   Cardiac Treatment with  Cardiac Rehab 1   [facility] [place] ([facility])    [address] [place] [address] 1st Floor [address]  [facility] [address]   [phone]            Mar 27, 2018  9:30 AM CDT   Return Visit with Brayan Mcclain DPM   [facility] ([facility])    [address] [place] [address] [phone]            2018  9:00 AM CDT   Return Visit with Brayan  DONNELL Mcclain   [facility] ([facility]    [address] [facility] [address]   [phone]            Apr 10, 2018  9:30 AM CDT   Return Visit with Brayan Mcclain DPM   [facility] ([facility])    [address] [facility] [address]   [phone]            Apr 17, 2018 10:30 AM CDT   Return Visit with Brayan Mcclain DPM   [facility] ([facility])    [address] [facility] [address]   [phone]            Apr 24, 2018  9:30 AM CDT   Return Visit with Brayan Mcclain DPM   [facility] ([facility])    [address] [facility] [place] [address]   [phone]            Apr 27, 2018 10:25 AM CDT   (Arrive by 10:10 AM)   Return Visit with Racheal Swift MD   [facility] ([facility])    [address] [place] [address]  [address] [facility] [address]   [phone]            May 01, 2018  9:30 AM CDT   Return Visit with Brayan Mcclain DPM   [facility] ([facility])    [address] [facility] [address]   [phone]            May 08, 2018  9:30 AM CDT   Return Visit with Brayan Mcclain DPM   [facility] ([facility])    [address] [facility] [address]   [phone]            May 22, 2018  1:30 PM CDT   Return Visit with Rubio Chinchilla MD   [facility] ([facility])    [address] [place] [id number] [phone]              Who to contact     If you have questions or need follow up information about today's clinic visit or your schedule please contact [facility] directly at [phone].  Normal or non-critical lab and imaging results will be communicated to you by MyChart, letter or phone  within 4 business days after the clinic has received the results. If you do not hear from us within 7 days, please contact the clinic through [email or web address] or phone. If you have a critical or abnormal lab result, we will notify you by phone as soon as possible.  Submit refill requests through [email or web address] or call your pharmacy and they will forward the refill request to us. Please allow 3 business days for your refill to be completed.          Additional Information About Your Visit        [email or web address] Information     [email or web address] gives you secure access to your electronic health record. If you see a primary care provider, you can also send messages to your care team and make appointments. If you have questions, please call your primary care clinic.  If you do not have a primary care provider, please call [phone] and they will assist you.      [email or web address] is an electronic gateway that provides easy, online access to your medical records. With [email or web address], you can request a clinic appointment, read your test results, renew a prescription or communicate with your care team.     To access your existing account, please contact your [facility] or call [phone] for assistance.        Care EveryWhere ID     This is your Care EveryWhere ID. This could be used by other organizations to access your [place] medical records  [id number]        Your Vitals Were     Pulse Pulse Oximetry                92 98%           Blood Pressure from Last 3 Encounters:   03/20/18 120/52   03/19/18 115/64   03/13/18 103/58    Weight from Last 3 Encounters:   03/19/18 77.1 kg (170 lb)   03/15/18 76.7 kg (169 lb)   03/08/18 77.6 kg (171 lb)              Today, you had the following     No orders found for display         Today's Medication Changes          These changes are accurate as of 3/20/18 10:37 AM.  If you have any questions, ask your nurse or doctor.               These medicines have changed or have updated prescriptions.         Dose/Directions    clopidogrel 75 MG tablet   Commonly known as:  PLAVIX   This may have changed:  when to take this   Used for:  Peripheral vascular disease, unspecified        Dose:  75 mg   Take 1 tablet (75 mg) by mouth daily   Quantity:  30 tablet   Refills:  11       gentamicin 0.1 % cream   Commonly known as:  GARAMYCIN   This may have changed:    - when to take this  - reasons to take this  - additional instructions   Used for:  Ulcer of right lower leg, with fat layer exposed (H), Chronic venous hypertension with ulcer involving right side (H), Type 2 diabetes, controlled, with neuropathy (H)        Apply topically daily To right leg ulcer.   Quantity:  30 g   Refills:  5                Primary Care Provider Office Phone # Fax #    Racheal Swift [phone]       [address] [address]        Equal Access to Services     SAMSON MELTON : Nataliia Bedoya, wabruno abebe, hollie martinalchino jeffery, yolanda lopez. [facility] [phone].    ATENCIÓN: Si habla español, tiene a rodrigues disposición servicios gratuitos de asistencia lingüística. [facility] [phone].    We comply with applicable federal civil rights laws and [place] laws. We do not discriminate on the basis of race, color, national origin, age, disability, sex, sexual orientation, or gender identity.            Thank you!     Thank you for choosing [facility]  for your care. Our goal is always to provide you with excellent care. Hearing back from our patients is one way we can continue to improve our services. Please take a few minutes to complete the written survey that you may receive in the mail after your visit with us. Thank you!             Your Updated Medication List - Protect others around you: Learn how to safely use, store and throw away your medicines at www.disposemymeds.org.          This list is accurate as of 3/20/18 10:37  AM.  Always use your most recent med list.                   Brand Name Dispense Instructions for use Diagnosis    ammonium lactate 12 % cream    LAC-HYDRIN    385 g    Apply topically 2 times daily as needed for dry skin    Venous stasis, Type 2 diabetes, controlled, with neuropathy (H)       ascorbic acid 500 MG Tabs     30 tablet    Take 1 tablet (500 mg) by mouth 2 times daily    Ulcer of right lower leg, with fat layer exposed (H)       ASPIRIN PO      Take 81 mg by mouth daily        blood glucose monitoring lancets     3 Box    Use to test blood sugars 2 as directed.    Type 2 diabetes, uncontrolled, with neuropathy (H)       blood glucose monitoring test strip    ONETOUCH ULTRA    60 strip    Use to test blood sugars 2 times daily or as directed.    Type 2 diabetes mellitus (H)       clopidogrel 75 MG tablet    PLAVIX    30 tablet    Take 1 tablet (75 mg) by mouth daily    Peripheral vascular disease, unspecified       COLACE PO      Take 100 mg by mouth daily        ferrous sulfate 325 (65 FE) MG tablet    IRON    60 tablet    Take 1 tablet (325 mg) by mouth 2 times daily    Peripheral vascular disease, unspecified       gentamicin 0.1 % cream    GARAMYCIN    30 g    Apply topically daily To right leg ulcer.    Ulcer of right lower leg, with fat layer exposed (H), Chronic venous hypertension with ulcer involving right side (H), Type 2 diabetes, controlled, with neuropathy (H)       insulin glargine 100 UNIT/ML injection    LANTUS SOLOSTAR    3 mL    Inject 23 Units Subcutaneous daily (with dinner) May take up to 25 units daily    Type 2 diabetes mellitus with diabetic peripheral angiopathy without gangrene, with long-term current use of insulin (H)       insulin pen needle 31G X 8 MM    B-D U/F    100 each    Use 1 daily o as directed    Diabetes mellitus, type II (H)       LISINOPRIL PO      Take 20 mg by mouth 2 times daily        nateglinide 120 MG tablet    STARLIX    90 tablet    TAKE 1 TABLET BY MOUTH  "THREE TIMES DAILY BEFORE MEALS    Type 2 diabetes, controlled, with neuropathy (H)       OPTIFOAM 6\"X6\" Pads     1 each    1 Box once a week    Ulcer of right leg, with fat layer exposed (H)       * order for DME     2 each    Please measure and distribute 1 pair of 20mm Hg - 30mm Hg knee high ULCER CARE open or closed toe compression stockings.  Patient has a size 13 foot and please take this into consideration.  Jobst or equivalent    Varicose veins of lower extremities with other complications, Venous stasis ulcer of right lower extremity (H)       * order for DME     3 each    Please measure and distribute 1 pair of 20mmHg - 30mmHg knee high open or closed toe compression stockings. Jobst ultrasheer or equivalent.    Varicose veins of both lower extremities with complications       * order for DME     2 each    Please measure and distribute 1 pair of 30mmHg - 40mmHg knee high open toe ulcercare compression stockings. Jobst ultrasheer or equivalent.    Varicose veins of bilateral lower extremities with other complications       sildenafil 50 MG tablet    VIAGRA    10 tablet    Take 1 tablet (50 mg) by mouth daily as needed for erectile dysfunction    Vasculogenic erectile dysfunction, unspecified vasculogenic erectile dysfunction type       silver sulfADIAZINE 1 % cream    SILVADENE    85 g    Apply topically daily To affected areas on right foot and leg.    Ulcer of right lower leg, with fat layer exposed (H), Chronic venous hypertension with ulcer involving right side (H), Type 2 diabetes, controlled, with neuropathy (H)       simvastatin 10 MG tablet    ZOCOR    90 tablet    Take 1 tablet (10 mg) by mouth At Bedtime    Type 2 diabetes, controlled, with neuropathy (H)       * sitagliptin 100 MG tablet    JANUVIA    90 tablet    Take 1 tablet (100 mg) by mouth daily    Type 2 diabetes, controlled, with neuropathy (H)       * sitagliptin 100 MG tablet    JANUVIA    90 tablet    Take 1 tablet (100 mg) by mouth " daily    Type 2 diabetes, HbA1c goal < 7% (H)       triamcinolone 0.1 % cream    KENALOG    30 g    Apply sparingly to left heel daily.    Dermatitis of left foot       VITAMIN C PO      Take 1,000 mg by mouth        VITAMIN D (CHOLECALCIFEROL) PO      Take 1,000 Units by mouth 2 times daily        * Notice:  This list has 5 medication(s) that are the same as other medications prescribed for you. Read the directions carefully, and ask your doctor or other care provider to review them with you.

## 2018-03-21 ENCOUNTER — TELEPHONE (OUTPATIENT)
Dept: CARDIOLOGY | Facility: CLINIC | Age: 73
End: 2018-03-21

## 2018-03-21 NOTE — TELEPHONE ENCOUNTER
Patient has a positive FIT test and needs a colonoscopy per GI department.  Patient has had a recent stent in the LAD on 3/8/18 and was started on Plavix.  Reviewed with Dr. Chinchilla, who states that patient needs to be on Plavix for at least 6 months uninterrupted.  Patient can have an exploratory colonoscopy while on Plavix if the GI provider feels it is necessary.      Left message for patient to call clinic.        Kathleen Payne RN  Care Coordinator  [facility]  [phone]        .

## 2018-03-21 NOTE — TELEPHONE ENCOUNTER
Spoke with patient and relayed MD's response.  Patient verbalized understanding and will communicate with GI provider.    Kathleen Payne RN

## 2018-03-22 ENCOUNTER — HOSPITAL ENCOUNTER (OUTPATIENT)
Dept: CARDIAC REHAB | Facility: CLINIC | Age: 73
End: 2018-03-22
Attending: INTERNAL MEDICINE
Payer: MEDICARE

## 2018-03-22 PROCEDURE — 93798 PHYS/QHP OP CAR RHAB W/ECG: CPT | Performed by: REHABILITATION PRACTITIONER

## 2018-03-22 PROCEDURE — 40000116 ZZH STATISTIC OP CR VISIT: Performed by: REHABILITATION PRACTITIONER

## 2018-03-26 ENCOUNTER — HOSPITAL ENCOUNTER (OUTPATIENT)
Dept: CARDIAC REHAB | Facility: CLINIC | Age: 73
End: 2018-03-26
Attending: INTERNAL MEDICINE
Payer: MEDICARE

## 2018-03-26 PROCEDURE — 40000116 ZZH STATISTIC OP CR VISIT: Performed by: OCCUPATIONAL THERAPIST

## 2018-03-26 PROCEDURE — 93798 PHYS/QHP OP CAR RHAB W/ECG: CPT | Performed by: OCCUPATIONAL THERAPIST

## 2018-03-27 ENCOUNTER — OFFICE VISIT (OUTPATIENT)
Dept: PODIATRY | Facility: CLINIC | Age: 73
End: 2018-03-27
Payer: MEDICARE

## 2018-03-27 VITALS — HEART RATE: 104 BPM | SYSTOLIC BLOOD PRESSURE: 102 MMHG | OXYGEN SATURATION: 98 % | DIASTOLIC BLOOD PRESSURE: 42 MMHG

## 2018-03-27 DIAGNOSIS — L97.512 SKIN ULCER OF RIGHT FOOT WITH FAT LAYER EXPOSED (H): ICD-10-CM

## 2018-03-27 DIAGNOSIS — E11.49 TYPE II OR UNSPECIFIED TYPE DIABETES MELLITUS WITH NEUROLOGICAL MANIFESTATIONS, NOT STATED AS UNCONTROLLED(250.60) (H): ICD-10-CM

## 2018-03-27 DIAGNOSIS — E11.51 DIABETES MELLITUS WITH PERIPHERAL VASCULAR DISEASE (H): ICD-10-CM

## 2018-03-27 DIAGNOSIS — L97.912 ULCER OF RIGHT LOWER EXTREMITY WITH FAT LAYER EXPOSED (H): Primary | ICD-10-CM

## 2018-03-27 PROCEDURE — 99212 OFFICE O/P EST SF 10 MIN: CPT | Performed by: PODIATRIST

## 2018-03-27 ASSESSMENT — PAIN SCALES - GENERAL: PAINLEVEL: NO PAIN (0)
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
## 2018-03-27 NOTE — NURSING NOTE
"Amos Walker's goals for this visit include: Recheck right leg ulcer  He requests these members of his care team be copied on today's visit information: yes    PCP: Racheal Swift    Referring Provider:  Referred Self, MD  No address on file    Chief Complaint   Patient presents with     RECHECK     Right leg ulcer       Initial /42  Pulse 104  SpO2 98% Estimated body mass index is 21.82 kg/(m^2) as calculated from the following:    Height as of 3/15/18: 1.88 m (6' 2.02\").    Weight as of 3/19/18: 77.1 kg (170 lb).  Medication Reconciliation: complete    "

## 2018-03-27 NOTE — PATIENT INSTRUCTIONS
Thanks for coming today.  [facility] Clinic  [address] [place] [address]    To schedule future appointments in Ortho Clinic, you may call [phone].    To schedule ordered imaging by your provider:   Call Central Imaging [phone]    To schedule an injection ordered by your provider:  Call Central Imaging Injection scheduling line: [phone]  [email or web address] available online at:  [email or web address]    Please call if any further questions or concerns ([phone]).  Clinic hours 8 am to 5 pm.    Return to clinic (call) if symptoms worsen or fail to improve.

## 2018-03-27 NOTE — MR AVS SNAPSHOT
After Visit Summary   3/27/2018    Amos Walker    MRN: [id number]           Patient Information     Date Of Birth          1945        Visit Information        Provider Department      3/27/2018 9:30 AM Brayan Mcclain DPM [facility]        Today's Diagnoses     Ulcer of right lower extremity with fat layer exposed (H)    -  1    Skin ulcer of right foot with fat layer exposed (H)        Type II or unspecified type diabetes mellitus with neurological manifestations, not stated as uncontrolled(250.60) (H)        Diabetes mellitus with peripheral vascular disease (H)          Care Instructions    Thanks for coming today.  [facility]  [address] [place] [address]    To schedule future appointments in Ortho Clinic, you may call [phone].    To schedule ordered imaging by your provider:   Call Central Imaging [phone]    To schedule an injection ordered by your provider:  Call Central Imaging Injection scheduling line: [phone]  [facility] [facility] available online at:  [email or web address]    Please call if any further questions or concerns ([phone]).  Clinic hours 8 am to 5 pm.    Return to clinic (call) if symptoms worsen or fail to improve.            Follow-ups after your visit        Your next 10 appointments already scheduled     Mar 29, 2018  4:30 PM CDT   Cardiac Treatment with Ur Cardiac Rehab 1   [facility], Cardiac Rehabilitation ([facility])    [address] [place] [address]   [phone]            Mar 30, 2018  1:00 PM CDT   Cardiac Treatment with Ur Cardiac Rehab 1   [facility], Cardiac Rehabilitation ([facility])    [address] [place] [address]   [phone]            2018  9:00 AM CDT   Return Visit with  Brayan Mcclain DPM   [facility]    [address] [facility] [address]   [phone]            Apr 10, 2018  9:30 AM CDT   Return Visit with Brayan Mcclain DPM   [facility]    [address] [facility] [address]   [phone]            Apr 17, 2018 10:30 AM CDT   Return Visit with Brayan Mcclain DPM   [facility]    [address] [facility] [address]   [phone]            Apr 18, 2018   Procedure with Rickie Gautam MD   [facility] [place], Endoscopy ([facility])    [address] [facility] [address]   [phone]           The [facility] is located on the corner of [facility] and [facility] on the [facility]. It is easily accessible from virtually any point in the [facility] area, via [address] and [address].            Apr 24, 2018  9:30 AM CDT   Return Visit with Brayan Mcclain DPM   [facility]    [address] [facility] [address]   [phone]            Apr 27, 2018 10:25 AM CDT   (Arrive by 10:10 AM)   Return Visit with Racheal Swift MD   [facility] ([facility])    [address] [address]   [phone]            May 01, 2018  9:30 AM CDT   Return Visit with Brayan Mcclain DPM   [facility]    [address] [facility] [address]   [phone]            May 08, 2018  9:30 AM CDT   Return Visit with Brayan Mcclain DPM   [facility] ([facility])    [address] [facility] [address]   [phone]              Who to contact     If you have questions or need follow up  information about today's clinic visit or your schedule please contact [facility] directly at [phone].  Normal or non-critical lab and imaging results will be communicated to you by [employer], letter or phone within 4 business days after the clinic has received the results. If you do not hear from us within 7 days, please contact the clinic through [employer] or phone. If you have a critical or abnormal lab result, we will notify you by phone as soon as possible.  Submit refill requests through [employer] or call your pharmacy and they will forward the refill request to us. Please allow 3 business days for your refill to be completed.          Additional Information About Your Visit        [employer] Information     [employer] gives you secure access to your electronic health record. If you see a primary care provider, you can also send messages to your care team and make appointments. If you have questions, please call your primary care clinic.  If you do not have a primary care provider, please call [phone] and they will assist you.      [employer] is an electronic gateway that provides easy, online access to your medical records. With [employer], you can request a clinic appointment, read your test results, renew a prescription or communicate with your care team.     To access your existing account, please contact your [facility] or call [phone] for assistance.        Care EveryWhere ID     This is your Care EveryWhere ID. This could be used by other organizations to access your [facility] medical records  [id number]        Your Vitals Were     Pulse Pulse Oximetry                104 98%           Blood Pressure from Last 3 Encounters:   03/27/18 102/42   03/20/18 120/52   03/19/18 115/64    Weight from Last 3 Encounters:   03/19/18 77.1 kg (170 lb)   03/15/18 76.7 kg (169 lb)   03/08/18 77.6 kg (171 lb)              Today, you had the following     No orders found for  display         Today's Medication Changes          These changes are accurate as of 3/27/18  9:30 AM.  If you have any questions, ask your nurse or doctor.               These medicines have changed or have updated prescriptions.        Dose/Directions    clopidogrel 75 MG tablet   Commonly known as:  PLAVIX   This may have changed:  when to take this   Used for:  Peripheral vascular disease, unspecified        Dose:  75 mg   Take 1 tablet (75 mg) by mouth daily   Quantity:  30 tablet   Refills:  11       gentamicin 0.1 % cream   Commonly known as:  GARAMYCIN   This may have changed:    - when to take this  - reasons to take this  - additional instructions   Used for:  Ulcer of right lower leg, with fat layer exposed (H), Chronic venous hypertension with ulcer involving right side (H), Type 2 diabetes, controlled, with neuropathy (H)        Apply topically daily To right leg ulcer.   Quantity:  30 g   Refills:  5                Primary Care Provider Office Phone # Fax #    Racheal Swift [phone] [address] [address]        Equal Access to Services     SAMSON MELTON : Hadii niurka ku hadasho Soomaali, waaxda luqadaha, qaybta kaalmada adeegyada, waxanna duran . So [facility] [phone].    ATENCIÓN: Si habla español, tiene a rodrigues disposición servicios gratuitos de asistencia lingüística. Ll[facility] [phone].    We comply with applicable federal civil rights laws and [place] laws. We do not discriminate on the basis of race, color, national origin, age, disability, sex, sexual orientation, or gender identity.            Thank you!     Thank you for choosing [facility]  for your care. Our goal is always to provide you with excellent care. Hearing back from our patients is one way we can continue to improve our services. Please take a few minutes to complete the written survey that you may receive in the mail after your  visit with us. Thank you!             Your Updated Medication List - Protect others around you: Learn how to safely use, store and throw away your medicines at www.disposemymeds.org.          This list is accurate as of 3/27/18  9:30 AM.  Always use your most recent med list.                   Brand Name Dispense Instructions for use Diagnosis    ammonium lactate 12 % cream    LAC-HYDRIN    385 g    Apply topically 2 times daily as needed for dry skin    Venous stasis, Type 2 diabetes, controlled, with neuropathy (H)       ascorbic acid 500 MG Tabs     30 tablet    Take 1 tablet (500 mg) by mouth 2 times daily    Ulcer of right lower leg, with fat layer exposed (H)       ASPIRIN PO      Take 81 mg by mouth daily        blood glucose monitoring lancets     3 Box    Use to test blood sugars 2 as directed.    Type 2 diabetes, uncontrolled, with neuropathy (H)       blood glucose monitoring test strip    ONETOUCH ULTRA    60 strip    Use to test blood sugars 2 times daily or as directed.    Type 2 diabetes mellitus (H)       clopidogrel 75 MG tablet    PLAVIX    30 tablet    Take 1 tablet (75 mg) by mouth daily    Peripheral vascular disease, unspecified       COLACE PO      Take 100 mg by mouth daily        ferrous sulfate 325 (65 FE) MG tablet    IRON    60 tablet    Take 1 tablet (325 mg) by mouth 2 times daily    Peripheral vascular disease, unspecified       gentamicin 0.1 % cream    GARAMYCIN    30 g    Apply topically daily To right leg ulcer.    Ulcer of right lower leg, with fat layer exposed (H), Chronic venous hypertension with ulcer involving right side (H), Type 2 diabetes, controlled, with neuropathy (H)       insulin glargine 100 UNIT/ML injection    LANTUS SOLOSTAR    3 mL    Inject 23 Units Subcutaneous daily (with dinner) May take up to 25 units daily    Type 2 diabetes mellitus with diabetic peripheral angiopathy without gangrene, with long-term current use of insulin (H)       insulin pen needle 31G  "X 8 MM    B-D U/F    100 each    Use 1 daily o as directed    Diabetes mellitus, type II (H)       LISINOPRIL PO      Take 20 mg by mouth 2 times daily        nateglinide 120 MG tablet    STARLIX    90 tablet    TAKE 1 TABLET BY MOUTH THREE TIMES DAILY BEFORE MEALS    Type 2 diabetes, controlled, with neuropathy (H)       OPTIFOAM 6\"X6\" Pads     1 each    1 Box once a week    Ulcer of right leg, with fat layer exposed (H)       order for DME     2 each    Please measure and distribute 1 pair of 20mm Hg - 30mm Hg knee high ULCER CARE open or closed toe compression stockings.  Patient has a size 13 foot and please take this into consideration.  Jobst or equivalent    Varicose veins of lower extremities with other complications, Venous stasis ulcer of right lower extremity (H)       order for DME     3 each    Please measure and distribute 1 pair of 20mmHg - 30mmHg knee high open or closed toe compression stockings. Jobst ultrasheer or equivalent.    Varicose veins of both lower extremities with complications       order for DME     2 each    Please measure and distribute 1 pair of 30mmHg - 40mmHg knee high open toe ulcercare compression stockings. Jobst ultrasheer or equivalent.    Varicose veins of bilateral lower extremities with other complications       sildenafil 50 MG tablet    VIAGRA    10 tablet    Take 1 tablet (50 mg) by mouth daily as needed for erectile dysfunction    Vasculogenic erectile dysfunction, unspecified vasculogenic erectile dysfunction type       silver sulfADIAZINE 1 % cream    SILVADENE    85 g    Apply topically daily To affected areas on right foot and leg.    Ulcer of right lower leg, with fat layer exposed (H), Chronic venous hypertension with ulcer involving right side (H), Type 2 diabetes, controlled, with neuropathy (H)       simvastatin 10 MG tablet    ZOCOR    90 tablet    Take 1 tablet (10 mg) by mouth At Bedtime    Type 2 diabetes, controlled, with neuropathy (H)       * " sitagliptin 100 MG tablet    JANUVIA    90 tablet    Take 1 tablet (100 mg) by mouth daily    Type 2 diabetes, controlled, with neuropathy (H)       * sitagliptin 100 MG tablet    JANUVIA    90 tablet    Take 1 tablet (100 mg) by mouth daily    Type 2 diabetes, HbA1c goal < 7% (H)       triamcinolone 0.1 % cream    KENALOG    30 g    Apply sparingly to left heel daily.    Dermatitis of left foot       VITAMIN C PO      Take 1,000 mg by mouth        VITAMIN D (CHOLECALCIFEROL) PO      Take 1,000 Units by mouth 2 times daily        * Notice:  This list has 2 medication(s) that are the same as other medications prescribed for you. Read the directions carefully, and ask your doctor or other care provider to review them with you.

## 2018-03-29 ENCOUNTER — HOSPITAL ENCOUNTER (OUTPATIENT)
Dept: CARDIAC REHAB | Facility: CLINIC | Age: 73
End: 2018-03-29
Attending: INTERNAL MEDICINE
Payer: MEDICARE

## 2018-03-29 VITALS — BODY MASS INDEX: 21.82 KG/M2 | WEIGHT: 170 LBS | HEIGHT: 74 IN

## 2018-03-29 PROCEDURE — 40000116 ZZH STATISTIC OP CR VISIT

## 2018-03-29 PROCEDURE — 93798 PHYS/QHP OP CAR RHAB W/ECG: CPT

## 2018-03-29 ASSESSMENT — 6 MINUTE WALK TEST (6MWT)
FEMALE CALC: 1581.38
TOTAL DISTANCE WALKED (FT): 1156
GENDER SELECTION: MALE
MALE CALC: 2055.79
PREDICTED: 2068.32

## 2018-03-29 NOTE — PROGRESS NOTES
Physician cosignature/electronic signature indicates approval of this ITP document. I have established, reviewed and made necessary changes to the individualized treatment plan and exercise prescription for this patient.   03/29/18 1800   Session   Session 30 Day Individualized Treatment Plan   Certified through this date 05/02/18   Cardiac Rehab Assessment   Cardiac Rehab Assessment Amos Walker is a 72 year old gentleman with a history of PAD, and CAD presenting with two vessel CAD, and underwent coronary angiogram on 3/1 which demonstrated two vessel disease, however, d/t contrast load already given, and needed to wait until 3/8 for successful percutaneous coronary intervention of the left main, mid and proximal LAD, proximal and mid RCA. 3/29 Patient feels he is beginning to have more stamina walking. He has been relying on his cane less, but carrying with if he needs to use it. Currently at 2.7 METs and has attended 5 sessions. Skilled therapy is recommended to monitor CV resonse to exercise, to provide education on exercise principles and risk factors of smoking and support to acheive goals.   General Information   Treatment Diagnosis Stent   Date of Treatment Diagnosis 03/08/18   Significant Past CV History PAD   Comorbidities PAD;DM   Other Medical History [facility]   Hospital Location [facility]   Hospital Discharge Date 03/08/18   Signs and Symptoms Post Hospital Discharge Fatigue   Outpatient Cardiac Rehab Start Date 03/15/18   Primary Physician Racheal Swift   Primary Physician Follow Up Completed   Surgeon Ghanshyam Moore MD   Cardiologist Dr. Chinchilla   Ejection Fraction 51%   Risk Stratification Low   Summary of Cath Report   Summary of Cath Report Available   Date Performed 03/01/18   Left Main 50-60%   LAD a long 50-60% proximal LAD stenosis, long 60% mLAD stenosis    LCX Prox 25-50%, mLCX 25-50, pLCX 50%   RCA pRCA 50-60% & mRCA 70%   Cath Report Comments Two vessel coronary  "artery disease with left main involvement.   Living and Work Status    Living Arrangements and Social Status [facility]   Support System Live with an adult   Return to Employment Retired   Occupation    Preventative Medications   CMS recommended medications Ace inhibitors;Antiplatelets;Lipid Lowering;Influenza vaccination;Pneumonia vaccination   Fall Risk Screen   Fall screen completed by Cardiac Rehab   Have you fallen 2 or more times in the past year? Yes   Have you fallen and had an injury in the past year? Yes  (Pt broke hip in 8/17)   Timed Up and Go score (seconds) 1st 13.45, 2nd 11.88   Fall screen comments Pt was seen my physical therapy in the fall of 2017   Pain   Patient Currently in Pain No   Physical Assessments   Incisions WNL   Edema None   Right Lung Sounds not assessed   Left Lung Sounds not assessed   Limitations Orthopedic   Comments Pt had hip surgery in 8/217   Individualized Treatment Plan   Monitored Sessions Scheduled 24   Monitored Sessions Attended 5   Oxygen   Supplemental Oxygen needed No   Nutrition Management - Weight Management   Assessment Re-assessment   Age 72   Weight 77.1 kg (170 lb)   Height 1.88 m (6' 2.02\")   BMI (Calculated) 21.86   Initial Rate Your Plate Score. Dietary tool to assess eating patterns. Scores range from 24 to 72. The higher the score the healthier the eating pattern. 64   Nutrition Management - Lipids   Lipids Labs Available   Date 10/25/17   Total Cholesterol 92   Triglycerides 100   HDL 41   LDL 30   Prescribed Lipid Medication Yes   Statin Intensity Low Intensity   Nutrition Management - Diabetes   Diabetes Type II   Do you Monitor BS at Home? Other (see comments)  (Pt has started to check check BS more regular)   Diabetes Medication Prescribed Yes, Insulin;Yes, Oral Medication   Hb A1C Date: 10/25/17   Hb A1C Result: 6.5   Nutrition Management Summary   Dietary Recommendations Low Fat;Low Cholesterol;Low Sodium   Stages of Change for Diet " Compliance Preparation   Interventions Planned Attend Nutrition Education Class(es)   Psychosocial Management   Psychosocial Assessment Re-assessment   Is there history of clinical depression or increased risk of depression? No previous history   Current Level of Stress per Patient Report Moderate    Current Coping Skills Other (see comments)  (Prayer and smoking)   Initial Patient Health Questionnaire -9 Score (PHQ-9) for depression. 5-9 Minimal symptoms, 10-14 Minor depression, 15-19 Major depression, moderately severe, > 20 Major depression, severe  3   Initial [facility] Survey score.  Quality of Life:   If total score > 25 review individual areas where patient rated a 4 or 5.  Consider patients current medical condition and what role that plays on the score.   Adjust treatment protocol to improve areas of concern.  Consider the following:  PHQ9 score, DASI, and re-assessment within the next 30 days to assist with developing treatments.  21   Stages of Change Preparation   Interventions Planned Patient denies need for intervention at this time.   Other Core Components - Hypertension   History of or Diagnosis of Hypertension Yes   Currently taking Anti-Hypertensives Yes;Ace Inhibitor   Other Core Components - Tobacco   History of Tobacco Use Yes   Tobacco Use Status Currently smoking - everyday   Tobacco Habit Cigarettes   Tobacco Use per Day (average) (<0.5ppd)   Years of Tobacco Use 55   Stages of Change Contemplation   Tobacco Comments 3/29 Patient not ready to set quit date at this time.   Other Core Components Summary   Interventions Planned Educate on benefits of smoking cessation   Activity/Exercise History   Activity/Exercise Assessment Re-assessment   Activity/Exercise Status prior to event? Sedentary   Number of Days Currently participating in Moderate Physical Activity? 2   Number of Days Currently performing  Aerobic Exercise (including rehab)? 0   Number of Minutes per Session Currently of  Aerobic Exercise (average)? 0   Current Stage of Change (Physical Activity) Preparation   Current Stage of Change (Aerobic Exercise) Preparation   Patient Goals Goal #1;Goal #2   Goal #1 Description Pt will increae his strength and endurance to be able to walk 10 minutes without a cane   Goal #1 Target Date 04/29/18   Goal #1 Progress Towards Goal 3/29 Patient feels he has already increased strength/stamina to walk without cane. He is relying less on using the cane while walking to rehab and at the mall.   Goal #2 Description Pt will have the confidence to be able to resume walking 40 minutes 3-5x per week.   Goal #2 Target Date 05/14/18   Goal #2 Progress Towards Goal 3/39 Patient is currently walking 2 days outside of rehab for 20-30 minutes continuously.   Activity/Exercise Comments Pt was walking 40 minutes a day 4-5x a week up until he broke his hip in August   Exercise Assessment   6 Minute Walk Predicted - Gender Selection Male   6 Minute Walk Predicted (Male) 2055.79   6 Minute Walk Predicted (Female) 1581.38   Initial 6 Minute Walk Distance (Feet) 1156 ft   Resting HR 87 bpm  (values from 3/29)   Exercise  bpm   Resting /50   Exercise /66   Post Exercise /40   Pre  mg/dL   Post  mg/dL   Effort Rating 4   Current MET Level 2.7   MET Level Goal 5+   ECG Rhythm Normal sinus rhythm   Ectopy None   Current Symptoms Denies symptoms   Limitations/Restrictions None   Exercise Prescription   Mode Treadmill;Airdyne;Nustep;Weights   Duration/Time 15-30 min   Frequency 3 daysweek   THR (85% of age predicted max HR) 127.5   OMNI Effort Rating (0-10 Scale) 4-6/10   Progression Continuous bouts;Total exercise time of 20-30 minutes;Aerobic exercise to OMNI rating of 5-7, and heart rate at or below target;Progress peak intensity by 1/2 MET per week   Recommended Home Exercise   Type of Exercise Walking   Frequency (days per week) daily   Duration (minutes per session) 15-30 min   Effort  Rating Recommended 4-6/10   Current Home Exercise   Type of Exercise None   Follow-up/On-going Support   Provider follow-up needed on the following No follow-up needed   Learning Assessment   Learner Patient   Primary Language English   Preferred Learning Style Reading;Pictures/Video   Barriers to Learning Hearing   Patient Education   Education recommended Anatomy and Physiology of the Heart;Exercise Principles;Medication Overview;Nutrition;Risk Factors;Smoking Cessation

## 2018-03-30 ENCOUNTER — HOSPITAL ENCOUNTER (OUTPATIENT)
Dept: CARDIAC REHAB | Facility: CLINIC | Age: 73
End: 2018-03-30
Attending: INTERNAL MEDICINE
Payer: MEDICARE

## 2018-03-30 PROCEDURE — 93798 PHYS/QHP OP CAR RHAB W/ECG: CPT

## 2018-03-30 PROCEDURE — 40000116 ZZH STATISTIC OP CR VISIT

## 2018-04-02 DIAGNOSIS — E11.9 TYPE 2 DIABETES MELLITUS (H): ICD-10-CM

## 2018-04-03 ENCOUNTER — HOSPITAL ENCOUNTER (OUTPATIENT)
Dept: CARDIAC REHAB | Facility: CLINIC | Age: 73
End: 2018-04-03
Attending: INTERNAL MEDICINE
Payer: MEDICARE

## 2018-04-03 ENCOUNTER — OFFICE VISIT (OUTPATIENT)
Dept: PODIATRY | Facility: CLINIC | Age: 73
End: 2018-04-03
Payer: MEDICARE

## 2018-04-03 VITALS — DIASTOLIC BLOOD PRESSURE: 60 MMHG | HEART RATE: 107 BPM | OXYGEN SATURATION: 98 % | SYSTOLIC BLOOD PRESSURE: 118 MMHG

## 2018-04-03 DIAGNOSIS — L97.521 SKIN ULCER OF LEFT FOOT, LIMITED TO BREAKDOWN OF SKIN (H): ICD-10-CM

## 2018-04-03 DIAGNOSIS — E11.51 DIABETES MELLITUS WITH PERIPHERAL VASCULAR DISEASE (H): ICD-10-CM

## 2018-04-03 DIAGNOSIS — E11.49 TYPE II OR UNSPECIFIED TYPE DIABETES MELLITUS WITH NEUROLOGICAL MANIFESTATIONS, NOT STATED AS UNCONTROLLED(250.60) (H): ICD-10-CM

## 2018-04-03 DIAGNOSIS — L97.912 ULCER OF RIGHT LOWER EXTREMITY WITH FAT LAYER EXPOSED (H): Primary | ICD-10-CM

## 2018-04-03 DIAGNOSIS — L97.512 SKIN ULCER OF RIGHT FOOT WITH FAT LAYER EXPOSED (H): ICD-10-CM

## 2018-04-03 PROCEDURE — 93798 PHYS/QHP OP CAR RHAB W/ECG: CPT

## 2018-04-03 PROCEDURE — 40000116 ZZH STATISTIC OP CR VISIT

## 2018-04-03 PROCEDURE — 99213 OFFICE O/P EST LOW 20 MIN: CPT | Performed by: PODIATRIST

## 2018-04-03 NOTE — MR AVS SNAPSHOT
After Visit Summary   4/3/2018    Aoms Walker    MRN: [id number]           Patient Information     Date Of Birth          1945        Visit Information        Provider Department      4/3/2018 9:00 AM Brayan Mcclain DPM [facility]        Today's Diagnoses     Ulcer of right lower extremity with fat layer exposed (H)    -  1    Skin ulcer of right foot with fat layer exposed (H)        Skin ulcer of left foot, limited to breakdown of skin (H)        Type II or unspecified type diabetes mellitus with neurological manifestations, not stated as uncontrolled(250.60) (H)        Diabetes mellitus with peripheral vascular disease (H)          Care Instructions    Thanks for coming today.  Ortho/Sports Medicine Clinic  [address] [place] [address]    To schedule future appointments in Ortho Clinic, you may call [phone].    To schedule ordered imaging by your Provider: Call [facility] at [phone]    [employer] available online at:   [email or web address]    Please call if any further questions or concerns [phone] and ask for the Orthopedic Department. Clinic hours 8 am to 5 pm.    Return to clinic if symptoms worsen.            Follow-ups after your visit        Your next 10 appointments already scheduled     Apr 10, 2018  9:30 AM CDT   Return Visit with Brayan Mcclain DPM   [facility] ([facility])    [address] [place] [id number] [phone]            Apr 18, 2018   Procedure with Rickie Gautam MD   [facility] [place] ([facility])    [address] [address] [phone]           The [facility] is located on the corner of [address] and [address] on the [facility]. It is easily accessible from virtually any point in the [place] area, via [address] and [address].             Apr 24, 2018  9:30 AM CDT   Return Visit with Brayan Mcclain DPM   [facility]    [address] [facility] [address] [phone]            Apr 27, 2018 10:25 AM CDT   (Arrive by 10:10 AM)   Return Visit with Racheal Swift MD   [facility] ([facility])    [address]  4th Floor  [facility] [address] [phone]            May 01, 2018  9:30 AM CDT   Return Visit with Brayan Mcclian DPM   [facility] ([facility])    [address] [facility] [address] [phone]            May 08, 2018  9:30 AM CDT   Return Visit with Brayan Mcclain DPM   [facility] ([facility])    [address] [address] [place] [address] [phone]            May 22, 2018  1:30 PM CDT   Return Visit with Rubio Chinchilla MD   [facility] ([facility])    [address] [place] [address] 2nd Floor  [facility] [address]   [phone]            Jun 20, 2018 10:30 AM CDT   RETURN RETINA with Jen Aranda MD   [facility] ([facility])    [address] [address] [phone]              Who to contact     If you have questions or need follow up information about today's clinic visit or your schedule please contact [facility] directly at [phone].  Normal or non-critical lab and imaging results will be communicated to you by MyChart, letter or phone within 4 business days after the clinic has received the results. If you do not hear from us within 7 days, please contact the clinic through MyChart or phone. If you have a critical or abnormal lab result, we will notify you by phone as soon as possible.  Submit refill requests through [employer] or call your pharmacy and they will  forward the refill request to us. Please allow 3 business days for your refill to be completed.          Additional Information About Your Visit        [employer] Information     [employer] gives you secure access to your electronic health record. If you see a primary care provider, you can also send messages to your care team and make appointments. If you have questions, please call your primary care clinic.  If you do not have a primary care provider, please call [phone] and they will assist you.      [employer] is an electronic gateway that provides easy, online access to your medical records. With [employer], you can request a clinic appointment, read your test results, renew a prescription or communicate with your care team.     To access your existing account, please contact your [facility] or call [phone] for assistance.        Care EveryWhere ID     This is your Care EveryWhere ID. This could be used by other organizations to access your [place] medical records  [id number]        Your Vitals Were     Pulse Pulse Oximetry                107 98%           Blood Pressure from Last 3 Encounters:   04/03/18 118/60   03/27/18 102/42   03/20/18 120/52    Weight from Last 3 Encounters:   03/29/18 77.1 kg (170 lb)   03/19/18 77.1 kg (170 lb)   03/15/18 76.7 kg (169 lb)              Today, you had the following     No orders found for display         Today's Medication Changes          These changes are accurate as of 4/3/18 11:59 PM.  If you have any questions, ask your nurse or doctor.               These medicines have changed or have updated prescriptions.        Dose/Directions    clopidogrel 75 MG tablet   Commonly known as:  PLAVIX   This may have changed:  when to take this   Used for:  Peripheral vascular disease, unspecified        Dose:  75 mg   Take 1 tablet (75 mg) by mouth daily   Quantity:  30 tablet   Refills:  11       gentamicin 0.1 % cream   Commonly known as:   GARAMYCIN   This may have changed:    - when to take this  - reasons to take this  - additional instructions   Used for:  Ulcer of right lower leg, with fat layer exposed (H), Chronic venous hypertension with ulcer involving right side (H), Type 2 diabetes, controlled, with neuropathy (H)        Apply topically daily To right leg ulcer.   Quantity:  30 g   Refills:  5                Primary Care Provider Office Phone # Fax #    WidemanDionne Swift [phone]       0 [address] [address]        Equal Access to Services     [facility] Center[facility]AT : Hadii niurka webb hadasho Soomaali, waaxda luqadaha, qaybta kaalmada adejessee, yolanda duran . So [facility] [phone].    ATENCIÓN: Si habla español, tiene a rodrigues disposición servicios gratuitos de asistencia lingüística. Ll[facility] [phone].    We comply with applicable federal civil rights laws and [place] laws. We do not discriminate on the basis of race, color, national origin, age, disability, sex, sexual orientation, or gender identity.            Thank you!     Thank you for choosing [facility]  for your care. Our goal is always to provide you with excellent care. Hearing back from our patients is one way we can continue to improve our services. Please take a few minutes to complete the written survey that you may receive in the mail after your visit with us. Thank you!             Your Updated Medication List - Protect others around you: Learn how to safely use, store and throw away your medicines at www.disposemymeds.org.          This list is accurate as of 4/3/18 11:59 PM.  Always use your most recent med list.                   Brand Name Dispense Instructions for use Diagnosis    ammonium lactate 12 % cream    LAC-HYDRIN    385 g    Apply topically 2 times daily as needed for dry skin    Venous stasis, Type 2 diabetes, controlled, with neuropathy (H)       ascorbic acid 500 MG Tabs     30  "tablet    Take 1 tablet (500 mg) by mouth 2 times daily    Ulcer of right lower leg, with fat layer exposed (H)       ASPIRIN PO      Take 81 mg by mouth daily        blood glucose monitoring lancets     3 Box    Use to test blood sugars 2 as directed.    Type 2 diabetes, uncontrolled, with neuropathy (H)       clopidogrel 75 MG tablet    PLAVIX    30 tablet    Take 1 tablet (75 mg) by mouth daily    Peripheral vascular disease, unspecified       COLACE PO      Take 100 mg by mouth daily        ferrous sulfate 325 (65 FE) MG tablet    IRON    60 tablet    Take 1 tablet (325 mg) by mouth 2 times daily    Peripheral vascular disease, unspecified       gentamicin 0.1 % cream    GARAMYCIN    30 g    Apply topically daily To right leg ulcer.    Ulcer of right lower leg, with fat layer exposed (H), Chronic venous hypertension with ulcer involving right side (H), Type 2 diabetes, controlled, with neuropathy (H)       insulin glargine 100 UNIT/ML injection    LANTUS SOLOSTAR    3 mL    Inject 23 Units Subcutaneous daily (with dinner) May take up to 25 units daily    Type 2 diabetes mellitus with diabetic peripheral angiopathy without gangrene, with long-term current use of insulin (H)       insulin pen needle 31G X 8 MM    B-D U/F    100 each    Use 1 daily o as directed    Diabetes mellitus, type II (H)       LISINOPRIL PO      Take 20 mg by mouth 2 times daily        nateglinide 120 MG tablet    STARLIX    90 tablet    TAKE 1 TABLET BY MOUTH THREE TIMES DAILY BEFORE MEALS    Type 2 diabetes, controlled, with neuropathy (H)       ONETOUCH ULTRA test strip   Generic drug:  blood glucose monitoring     200 strip    USE TO TEST TWICE DAILY OR AS DIRECTED    Type 2 diabetes mellitus (H)       OPTIFOAM 6\"X6\" Pads     1 each    1 Box once a week    Ulcer of right leg, with fat layer exposed (H)       order for DME     2 each    Please measure and distribute 1 pair of 20mm Hg - 30mm Hg knee high ULCER CARE open or closed toe " compression stockings.  Patient has a size 13 foot and please take this into consideration.  Jobst or equivalent    Varicose veins of lower extremities with other complications, Venous stasis ulcer of right lower extremity (H)       order for DME     3 each    Please measure and distribute 1 pair of 20mmHg - 30mmHg knee high open or closed toe compression stockings. Jobst ultrasheer or equivalent.    Varicose veins of both lower extremities with complications       order for DME     2 each    Please measure and distribute 1 pair of 30mmHg - 40mmHg knee high open toe ulcercare compression stockings. Jobst ultrasheer or equivalent.    Varicose veins of bilateral lower extremities with other complications       sildenafil 50 MG tablet    VIAGRA    10 tablet    Take 1 tablet (50 mg) by mouth daily as needed for erectile dysfunction    Vasculogenic erectile dysfunction, unspecified vasculogenic erectile dysfunction type       silver sulfADIAZINE 1 % cream    SILVADENE    85 g    Apply topically daily To affected areas on right foot and leg.    Ulcer of right lower leg, with fat layer exposed (H), Chronic venous hypertension with ulcer involving right side (H), Type 2 diabetes, controlled, with neuropathy (H)       simvastatin 10 MG tablet    ZOCOR    90 tablet    Take 1 tablet (10 mg) by mouth At Bedtime    Type 2 diabetes, controlled, with neuropathy (H)       * sitagliptin 100 MG tablet    JANUVIA    90 tablet    Take 1 tablet (100 mg) by mouth daily    Type 2 diabetes, controlled, with neuropathy (H)       * sitagliptin 100 MG tablet    JANUVIA    90 tablet    Take 1 tablet (100 mg) by mouth daily    Type 2 diabetes, HbA1c goal < 7% (H)       triamcinolone 0.1 % cream    KENALOG    30 g    Apply sparingly to left heel daily.    Dermatitis of left foot       VITAMIN C PO      Take 1,000 mg by mouth        VITAMIN D (CHOLECALCIFEROL) PO      Take 1,000 Units by mouth 2 times daily        * Notice:  This list has 2  medication(s) that are the same as other medications prescribed for you. Read the directions carefully, and ask your doctor or other care provider to review them with you.
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
## 2018-04-03 NOTE — PATIENT INSTRUCTIONS
Thanks for coming today.  Ortho[facility] Medicine Clinic  [address] [place] [address]    To schedule future appointments in Ortho Clinic, you may call [phone].    To schedule ordered imaging by your Provider: Call [facility] at [phone]    [employer] available online at:   [email or web address]    Please call if any further questions or concerns [phone] and ask for the Orthopedic Department. Clinic hours 8 am to 5 pm.    Return to clinic if symptoms worsen.

## 2018-04-03 NOTE — NURSING NOTE
"Amos Walker's goals for this visit include: wound check   He requests these members of his care team be copied on today's visit information: yes    PCP: Racheal Swift    Referring Provider:  Referred Self, MD  No address on file    Chief Complaint   Patient presents with     RECHECK     wound check        Initial /60  Pulse 107  SpO2 98% Estimated body mass index is 21.82 kg/(m^2) as calculated from the following:    Height as of 3/29/18: 1.88 m (6' 2.02\").    Weight as of 3/29/18: 77.1 kg (170 lb).  Medication Reconciliation: complete    "

## 2018-04-10 ENCOUNTER — TELEPHONE (OUTPATIENT)
Dept: PODIATRY | Facility: CLINIC | Age: 73
End: 2018-04-10

## 2018-04-10 ENCOUNTER — OFFICE VISIT (OUTPATIENT)
Dept: PODIATRY | Facility: CLINIC | Age: 73
End: 2018-04-10
Payer: MEDICARE

## 2018-04-10 VITALS — DIASTOLIC BLOOD PRESSURE: 62 MMHG | HEART RATE: 73 BPM | SYSTOLIC BLOOD PRESSURE: 136 MMHG | OXYGEN SATURATION: 98 %

## 2018-04-10 DIAGNOSIS — E11.49 TYPE II OR UNSPECIFIED TYPE DIABETES MELLITUS WITH NEUROLOGICAL MANIFESTATIONS, NOT STATED AS UNCONTROLLED(250.60) (H): ICD-10-CM

## 2018-04-10 DIAGNOSIS — L97.521 SKIN ULCER OF LEFT FOOT, LIMITED TO BREAKDOWN OF SKIN (H): ICD-10-CM

## 2018-04-10 DIAGNOSIS — L97.512 SKIN ULCER OF RIGHT FOOT WITH FAT LAYER EXPOSED (H): Primary | ICD-10-CM

## 2018-04-10 DIAGNOSIS — E11.51 DIABETES MELLITUS WITH PERIPHERAL VASCULAR DISEASE (H): ICD-10-CM

## 2018-04-10 DIAGNOSIS — L97.912 ULCER OF RIGHT LOWER EXTREMITY WITH FAT LAYER EXPOSED (H): ICD-10-CM

## 2018-04-10 PROCEDURE — 99213 OFFICE O/P EST LOW 20 MIN: CPT | Performed by: PODIATRIST
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
## 2018-04-10 NOTE — NURSING NOTE
"Amos Walker's goals for this visit include: Leg recheck   He requests these members of his care team be copied on today's visit information: yes    PCP: Racheal Swift    Referring Provider:  Referred Self, MD  No address on file    Chief Complaint   Patient presents with     RECHECK     bilateral leg check       Initial /62  Pulse 73  SpO2 98% Estimated body mass index is 21.82 kg/(m^2) as calculated from the following:    Height as of 3/29/18: 1.88 m (6' 2.02\").    Weight as of 3/29/18: 77.1 kg (170 lb).  Medication Reconciliation: complete    "

## 2018-04-10 NOTE — MR AVS SNAPSHOT
After Visit Summary   4/10/2018    Amos Walker    MRN: [id number]           Patient Information     Date Of Birth          1945        Visit Information        Provider Department      4/10/2018 9:30 AM Brayan Mcclain DPM [facility]        Today's Diagnoses     Skin ulcer of right foot with fat layer exposed (H)    -  1    Skin ulcer of left foot, limited to breakdown of skin (H)        Ulcer of right lower extremity with fat layer exposed (H)        Type II or unspecified type diabetes mellitus with neurological manifestations, not stated as uncontrolled(250.60) (H)        Diabetes mellitus with peripheral vascular disease (H)          Care Instructions    Thanks for coming today.  Ortho/Sports Medicine Clinic  [address] [place] [address]    To schedule future appointments in Ortho Clinic, you may call [phone].    To schedule ordered imaging by your Provider: Call [facility] at [phone]    [employer] available online at:   [email or web address]    Please call if any further questions or concerns [phone] and ask for the Orthopedic Department. Clinic hours 8 am to 5 pm.    Return to clinic if symptoms worsen.            Follow-ups after your visit        Your next 10 appointments already scheduled     Apr 11, 2018  3:00 PM CDT   Cardiac Treatment with Ur Cardiac Rehab 1   [facility], Cardiac Rehabilitation ([facility])    [address] [place] [address]   [phone]            Apr 12, 2018  4:30 PM CDT   Cardiac Treatment with Ur Cardiac Rehab 1   [facility], Cardiac Rehabilitation ([facility])    [address] [place] [address]   [phone]            Apr 16, 2018  3:00 PM CDT   Cardiac Treatment with Ur Cardiac Rehab 1   [facility],  Cardiac Rehabilitation ([facility])    [address]  [facility] [address]   [phone]            Apr 18, 2018   Procedure with Rickie Gautam MD   [facility], Endoscopy ([facility])    [address]  [place] [address]   [phone]           The [facility] is located on the corner of [facility] and [facility] on the [facility]. It is easily accessible from virtually any point in the [facility] area, via [address] and [address].            Apr 20, 2018  3:00 PM CDT   Cardiac Treatment with Ur Cardiac Rehab 1   [facility], Cardiac Rehabilitation ([facility])    [address]  [facility] [address]   [phone]            Apr 24, 2018  9:30 AM CDT   Return Visit with Brayan Mcclain DPM   [facility] ([facility])    [address] [place] [address]   [phone]            Apr 27, 2018 10:25 AM CDT   (Arrive by 10:10 AM)   Return Visit with Racheal Swift MD   [facility] ([facility])    [address] [place] [address]  [address] [facility] [address]   [phone]            May 01, 2018  9:30 AM CDT   Return Visit with Brayan Mcclain DPM   [facility] ([facility])    [address] [place] [address]   [phone]            May 08, 2018  9:30 AM CDT   Return Visit with Brayan Mcclain DPM   [facility] ([facility])    [address] [facility] [address]   [phone]            May 22, 2018  1:30 PM CDT   Return Visit with Rubio Chinchilla MD   [facility] ([facility])    [address] [place] [address]  [place] [address] [phone]              Who to contact     If you have questions or need follow up information about today's clinic visit or your schedule please contact [facility] directly at [phone].  Normal or non-critical lab and imaging results will be communicated to you by [employer], letter or phone within 4 business days after the clinic has received the results. If you do not hear from us within 7 days, please contact the clinic through [employer] or phone. If you have a critical or abnormal lab result, we will notify you by phone as soon as possible.  Submit refill requests through [employer] or call your pharmacy and they will forward the refill request to us. Please allow 3 business days for your refill to be completed.          Additional Information About Your Visit        [employer] Information     [employer] gives you secure access to your electronic health record. If you see a primary care provider, you can also send messages to your care team and make appointments. If you have questions, please call your primary care clinic.  If you do not have a primary care provider, please call [phone] and they will assist you.      [employer] is an electronic gateway that provides easy, online access to your medical records. With [employer], you can request a clinic appointment, read your test results, renew a prescription or communicate with your care team.     To access your existing account, please contact your [facility] or call [phone] for assistance.        Care EveryWhere ID     This is your Care EveryWhere ID. This could be used by other organizations to access your [place] medical records  [id number]        Your Vitals Were     Pulse Pulse Oximetry                73 98%           Blood Pressure from Last 3 Encounters:   04/10/18 136/62   04/03/18 118/60   03/27/18 102/42    Weight from Last 3 Encounters:   03/29/18 77.1 kg (170 lb)    03/19/18 77.1 kg (170 lb)   03/15/18 76.7 kg (169 lb)              Today, you had the following     No orders found for display         Today's Medication Changes          These changes are accurate as of 4/10/18  9:45 AM.  If you have any questions, ask your nurse or doctor.               These medicines have changed or have updated prescriptions.        Dose/Directions    clopidogrel 75 MG tablet   Commonly known as:  PLAVIX   This may have changed:  when to take this   Used for:  Peripheral vascular disease, unspecified        Dose:  75 mg   Take 1 tablet (75 mg) by mouth daily   Quantity:  30 tablet   Refills:  11       gentamicin 0.1 % cream   Commonly known as:  GARAMYCIN   This may have changed:    - when to take this  - reasons to take this  - additional instructions   Used for:  Ulcer of right lower leg, with fat layer exposed (H), Chronic venous hypertension with ulcer involving right side (H), Type 2 diabetes, controlled, with neuropathy (H)        Apply topically daily To right leg ulcer.   Quantity:  30 g   Refills:  5                Primary Care Provider Office Phone # Fax #    Racheal Swift [phone]       [address] [address]        Equal Access to Services     [facility] : Hadii niurka Bedoya, wajunitoda andrae, qaybta kaalmada jose d, yolanda lopez. So [facility] [phone].    ATENCIÓN: Si habla español, tiene a rodrigues disposición servicios gratuitos de asistencia lingüística. Llame al [phone].    We comply with applicable federal civil rights laws and [place] laws. We do not discriminate on the basis of race, color, national origin, age, disability, sex, sexual orientation, or gender identity.            Thank you!     Thank you for choosing [facility]  for your care. Our goal is always to provide you with excellent care. Hearing back from our patients is one way we can continue to  improve our services. Please take a few minutes to complete the written survey that you may receive in the mail after your visit with us. Thank you!             Your Updated Medication List - Protect others around you: Learn how to safely use, store and throw away your medicines at www.disposemymeds.org.          This list is accurate as of 4/10/18  9:45 AM.  Always use your most recent med list.                   Brand Name Dispense Instructions for use Diagnosis    ammonium lactate 12 % cream    LAC-HYDRIN    385 g    Apply topically 2 times daily as needed for dry skin    Venous stasis, Type 2 diabetes, controlled, with neuropathy (H)       ascorbic acid 500 MG Tabs     30 tablet    Take 1 tablet (500 mg) by mouth 2 times daily    Ulcer of right lower leg, with fat layer exposed (H)       ASPIRIN PO      Take 81 mg by mouth daily        blood glucose monitoring lancets     3 Box    Use to test blood sugars 2 as directed.    Type 2 diabetes, uncontrolled, with neuropathy (H)       clopidogrel 75 MG tablet    PLAVIX    30 tablet    Take 1 tablet (75 mg) by mouth daily    Peripheral vascular disease, unspecified       COLACE PO      Take 100 mg by mouth daily        ferrous sulfate 325 (65 FE) MG tablet    IRON    60 tablet    Take 1 tablet (325 mg) by mouth 2 times daily    Peripheral vascular disease, unspecified       gentamicin 0.1 % cream    GARAMYCIN    30 g    Apply topically daily To right leg ulcer.    Ulcer of right lower leg, with fat layer exposed (H), Chronic venous hypertension with ulcer involving right side (H), Type 2 diabetes, controlled, with neuropathy (H)       insulin glargine 100 UNIT/ML injection    LANTUS SOLOSTAR    3 mL    Inject 23 Units Subcutaneous daily (with dinner) May take up to 25 units daily    Type 2 diabetes mellitus with diabetic peripheral angiopathy without gangrene, with long-term current use of insulin (H)       insulin pen needle 31G X 8 MM    B-D U/F    100 each    Use 1  "daily o as directed    Diabetes mellitus, type II (H)       LISINOPRIL PO      Take 20 mg by mouth 2 times daily        nateglinide 120 MG tablet    STARLIX    90 tablet    TAKE 1 TABLET BY MOUTH THREE TIMES DAILY BEFORE MEALS    Type 2 diabetes, controlled, with neuropathy (H)       ONETOUCH ULTRA test strip   Generic drug:  blood glucose monitoring     200 strip    USE TO TEST TWICE DAILY OR AS DIRECTED    Type 2 diabetes mellitus (H)       OPTIFOAM 6\"X6\" Pads     1 each    1 Box once a week    Ulcer of right leg, with fat layer exposed (H)       order for DME     2 each    Please measure and distribute 1 pair of 20mm Hg - 30mm Hg knee high ULCER CARE open or closed toe compression stockings.  Patient has a size 13 foot and please take this into consideration.  Jobst or equivalent    Varicose veins of lower extremities with other complications, Venous stasis ulcer of right lower extremity (H)       order for DME     3 each    Please measure and distribute 1 pair of 20mmHg - 30mmHg knee high open or closed toe compression stockings. Jobst ultrasheer or equivalent.    Varicose veins of both lower extremities with complications       order for DME     2 each    Please measure and distribute 1 pair of 30mmHg - 40mmHg knee high open toe ulcercare compression stockings. Jobst ultrasheer or equivalent.    Varicose veins of bilateral lower extremities with other complications       sildenafil 50 MG tablet    VIAGRA    10 tablet    Take 1 tablet (50 mg) by mouth daily as needed for erectile dysfunction    Vasculogenic erectile dysfunction, unspecified vasculogenic erectile dysfunction type       silver sulfADIAZINE 1 % cream    SILVADENE    85 g    Apply topically daily To affected areas on right foot and leg.    Ulcer of right lower leg, with fat layer exposed (H), Chronic venous hypertension with ulcer involving right side (H), Type 2 diabetes, controlled, with neuropathy (H)       simvastatin 10 MG tablet    ZOCOR    90 " tablet    Take 1 tablet (10 mg) by mouth At Bedtime    Type 2 diabetes, controlled, with neuropathy (H)       * sitagliptin 100 MG tablet    JANUVIA    90 tablet    Take 1 tablet (100 mg) by mouth daily    Type 2 diabetes, controlled, with neuropathy (H)       * sitagliptin 100 MG tablet    JANUVIA    90 tablet    Take 1 tablet (100 mg) by mouth daily    Type 2 diabetes, HbA1c goal < 7% (H)       triamcinolone 0.1 % cream    KENALOG    30 g    Apply sparingly to left heel daily.    Dermatitis of left foot       VITAMIN C PO      Take 1,000 mg by mouth        VITAMIN D (CHOLECALCIFEROL) PO      Take 1,000 Units by mouth 2 times daily        * Notice:  This list has 2 medication(s) that are the same as other medications prescribed for you. Read the directions carefully, and ask your doctor or other care provider to review them with you.

## 2018-04-10 NOTE — PATIENT INSTRUCTIONS
Thanks for coming today.  Ortho/Sports Medicine Clinic  [address] [place] [address]    To schedule future appointments in Ortho Clinic, you may call [phone].    To schedule ordered imaging by your Provider: Call [facility] at [phone]    [employer] available online at:   [email or web address]    Please call if any further questions or concerns [phone] and ask for the Orthopedic Department. Clinic hours 8 am to 5 pm.    Return to clinic if symptoms worsen.

## 2018-04-11 ENCOUNTER — HOSPITAL ENCOUNTER (OUTPATIENT)
Dept: CARDIAC REHAB | Facility: CLINIC | Age: 73
End: 2018-04-11
Attending: INTERNAL MEDICINE
Payer: MEDICARE

## 2018-04-11 PROCEDURE — 40000116 ZZH STATISTIC OP CR VISIT: Performed by: REHABILITATION PRACTITIONER

## 2018-04-11 PROCEDURE — 93798 PHYS/QHP OP CAR RHAB W/ECG: CPT | Performed by: REHABILITATION PRACTITIONER

## 2018-04-12 ENCOUNTER — MYC MEDICAL ADVICE (OUTPATIENT)
Dept: INTERNAL MEDICINE | Facility: CLINIC | Age: 73
End: 2018-04-12

## 2018-04-12 ENCOUNTER — TELEPHONE (OUTPATIENT)
Dept: GASTROENTEROLOGY | Facility: CLINIC | Age: 73
End: 2018-04-12

## 2018-04-12 ENCOUNTER — HOSPITAL ENCOUNTER (OUTPATIENT)
Dept: CARDIAC REHAB | Facility: CLINIC | Age: 73
End: 2018-04-12
Attending: INTERNAL MEDICINE
Payer: MEDICARE

## 2018-04-12 PROCEDURE — 40000116 ZZH STATISTIC OP CR VISIT

## 2018-04-12 PROCEDURE — 93798 PHYS/QHP OP CAR RHAB W/ECG: CPT

## 2018-04-12 NOTE — TELEPHONE ENCOUNTER
I received notification back from [facility] regarding coverage for the following with ICD-10 L97.512; 250.60; E11.49; E11.51  Patient has Medicare primary and BCBS [place] secondary coverage is as follows:    Apligraf- This follows guidelines and Medicare covers 80/20 no prior authorization. [facility] patient has a $300 deductible once met 50% coinsurance with $1,750 max out of pocket ($667.91)   Patient is verified for 5 applications for the life of wound- this treatment is going to give him the best insurance coverage since Medicare pays 80/20 then BCBS with pay 10% of the 20% patient will owe 10% of treatment so around $150 per treatment       [employer]ield-Medicare pays 80/20- BCBS not a covered benefit  [employer]ly-Medicare pays 80/20- BCBS not a covered benefit

## 2018-04-12 NOTE — TELEPHONE ENCOUNTER
[employer] Aguilar;    (1) Hold Januvia day of colonoscopy  (2) Hold Starlix day of colonoscopy  (3) Take 1/2 dose of PM lantus night before colonoscopy  (4) He should check his blood glucose values carefully during the colonoscopy prep  ROSA No

## 2018-04-13 ENCOUNTER — HOSPITAL ENCOUNTER (OUTPATIENT)
Dept: CARDIAC REHAB | Facility: CLINIC | Age: 73
End: 2018-04-13
Attending: INTERNAL MEDICINE
Payer: MEDICARE

## 2018-04-13 DIAGNOSIS — Z12.11 SPECIAL SCREENING FOR MALIGNANT NEOPLASMS, COLON: Primary | ICD-10-CM

## 2018-04-13 PROCEDURE — 40000116 ZZH STATISTIC OP CR VISIT: Performed by: OCCUPATIONAL THERAPIST

## 2018-04-13 PROCEDURE — 93798 PHYS/QHP OP CAR RHAB W/ECG: CPT | Performed by: OCCUPATIONAL THERAPIST

## 2018-04-13 NOTE — TELEPHONE ENCOUNTER
Patient scheduled for: Colonoscopy    Indication for procedure: Positive FIT, anemia, weight loss    Referring Provider: Racheal Swift MD    ? N/A    Arrival time verified? 10am    Facility location verified? [address]    Instructions given regarding prep and procedure: Pt received prep instructions, denied need for further review    Prep Type: GoLytely    Are you taking any anticoagulants or blood thinners? Plavix, ASA 81mg.  Pt stated his cardiologist told him not to stop his Plavix pre-procedure.      Instructions given? No instructions regarding stopping anticoagulation were given.  (RN contact Dr. Gautam regarding anticoagulation status).  RN instructed pt to contact PCP for instructions regarding diabetic medications.    Electronic implanted devices? Denied    Pre procedure teaching completed? Yes    Transportation from procedure? Pt has     H&P / Pre op physical completed? N/A

## 2018-04-18 ENCOUNTER — TELEPHONE (OUTPATIENT)
Dept: GASTROENTEROLOGY | Facility: CLINIC | Age: 73
End: 2018-04-18

## 2018-04-18 ENCOUNTER — HOSPITAL ENCOUNTER (OUTPATIENT)
Facility: CLINIC | Age: 73
Discharge: HOME | End: 2018-04-18
Attending: INTERNAL MEDICINE | Admitting: INTERNAL MEDICINE
Payer: MEDICARE

## 2018-04-18 ENCOUNTER — HOSPITAL ENCOUNTER (OUTPATIENT)
Facility: CLINIC | Age: 73
End: 2018-04-18
Attending: INTERNAL MEDICINE | Admitting: INTERNAL MEDICINE
Payer: MEDICARE

## 2018-04-18 VITALS
SYSTOLIC BLOOD PRESSURE: 131 MMHG | DIASTOLIC BLOOD PRESSURE: 70 MMHG | RESPIRATION RATE: 12 BRPM | OXYGEN SATURATION: 99 %

## 2018-04-18 DIAGNOSIS — D12.6 BENIGN NEOPLASM OF COLON, UNSPECIFIED PART OF COLON: Primary | ICD-10-CM

## 2018-04-18 LAB
COLONOSCOPY: NORMAL
GLUCOSE BLDC GLUCOMTR-MCNC: 154 MG/DL (ref 70–99)
GLUCOSE BLDC GLUCOMTR-MCNC: 180 MG/DL (ref 70–99)

## 2018-04-18 PROCEDURE — G0500 MOD SEDAT ENDO SERVICE >5YRS: HCPCS | Performed by: INTERNAL MEDICINE

## 2018-04-18 PROCEDURE — 25000128 H RX IP 250 OP 636: Performed by: INTERNAL MEDICINE

## 2018-04-18 PROCEDURE — 82962 GLUCOSE BLOOD TEST: CPT

## 2018-04-18 PROCEDURE — 45378 DIAGNOSTIC COLONOSCOPY: CPT | Performed by: INTERNAL MEDICINE

## 2018-04-18 PROCEDURE — 99152 MOD SED SAME PHYS/QHP 5/>YRS: CPT | Performed by: INTERNAL MEDICINE

## 2018-04-18 PROCEDURE — 99153 MOD SED SAME PHYS/QHP EA: CPT | Performed by: INTERNAL MEDICINE

## 2018-04-18 RX ORDER — FENTANYL CITRATE 50 UG/ML
INJECTION, SOLUTION INTRAMUSCULAR; INTRAVENOUS PRN
Status: DISCONTINUED | OUTPATIENT
Start: 2018-04-18 | End: 2018-04-18 | Stop reason: HOSPADM

## 2018-04-18 NOTE — OR NURSING
Tolerated procedure well with supplemental oxygen administered. Unable to remove polyps due to patient still taking plavix. Will return in the future to have polyps removed.

## 2018-04-18 NOTE — TELEPHONE ENCOUNTER
Pt with recent cardiac stent. Referred for colonoscopy today due to anemia and positive FIT test. Unable to stop plavix due to recent stent.    Exam showed a few low-risk polyps which will need removed but can wait till able to stop plavix.    Presume this will be 5 months from now (stent on 3/8/18).    Message sent to cardiology to confirm this timing.    GI lab schedulers, please arrange for colonoscopy in 5 months. Will need two day prep as suboptimal today.    Should be scheduled on day with staff only, not fellow as difficult exam.    Order placed.    VITO Gautam MD  Associate Professor of Medicine  Division of Gastroenterology, Hepatology and Nutrition  [facility]

## 2018-04-18 NOTE — DISCHARGE INSTRUCTIONS
Discharge Instructions after Colonoscopy  or Sigmoidoscopy  Today you had a ___x_ Colonoscopy ____ Sigmoidoscopy    Activity and Diet  You were given medicine for pain. You may be dizzy or sleepy.  For 24 hours:    Do not drive or use heavy equipment.    Do not make important decisions.    Do not drink any alcohol.  You may return to your normal diet and medicines.    Discomfort    Air was placed in your colon during the exam in order to see it. Walking helps to pass the air.    You may take Tylenol (acetaminophen) for pain unless your doctor has told you not to.      Follow-up      When to call:    Call right away if you have:    Unusual pain in belly or chest pain not relieved with passing air.    More than 1 to 2 Tablespoons of bleeding from your rectum.    Fever above 100.6  F (37.5  C).    If you have severe pain, bleeding, or shortness of breath, go to an emergency room.    If you have questions, call:  Monday to Friday, 7 a.m. to 4:30 p.m.  Endoscopy: [phone] (We may have to call you back)    After hours  Hospital: [phone] (Ask for the GI fellow on call)

## 2018-04-18 NOTE — IP AVS SNAPSHOT
[facility], [place], Endoscopy    [address] [address]    Phone:  [phone]                                       After Visit Summary   4/18/2018    Amos Walker    MRN: [id number]           After Visit Summary Signature Page     I have received my discharge instructions, and my questions have been answered. I have discussed any challenges I see with this plan with the nurse or doctor.    ..........................................................................................................................................  Patient/Patient Representative Signature      ..........................................................................................................................................  Patient Representative Print Name and Relationship to Patient    ..................................................               ................................................  Date                                            Time    ..........................................................................................................................................  Reviewed by Signature/Title    ...................................................              ..............................................  Date                                                            Time

## 2018-04-18 NOTE — IP AVS SNAPSHOT
MRN:[id number]                      After Visit Summary   4/18/2018    Amos Walker    MRN: [id number]           Thank you!     Thank you for choosing [facility] for your care. Our goal is always to provide you with excellent care. Hearing back from our patients is one way we can continue to improve our services. Please take a few minutes to complete the written survey that you may receive in the mail after you visit with us. Thank you!        Patient Information     Date Of Birth          1945        About your hospital stay     You were admitted on:  April 18, 2018 You last received care in the:  [facility], Endoscopy    You were discharged on:  April 18, 2018       Who to Call     For medical emergencies, please call 911.  For non-urgent questions about your medical care, please call your primary care provider or clinic, [phone]  For questions related to your surgery, please call your surgery clinic        Attending Provider     Provider Rickie De La Cruz MD Gastroenterology       Primary Care Provider Office Phone # Fax #    Racheal Swift [phone]      Your next 10 appointments already scheduled     Apr 20, 2018  3:00 PM CDT   Cardiac Treatment with  Cardiac Rehab 1   [facility], Cardiac Rehabilitation ([facility])    [address] 1st [address]  [facility] [id number] [phone]            Apr 24, 2018  9:30 AM CDT   Return Visit with Brayan Mcclain DPM   [facility] ([facility])    [address] [place] [address]   [phone]            Apr 27, 2018 10:25 AM CDT   (Arrive by 10:10 AM)   Return Visit with Racheal Swift MD   [facility] ([facility])    [address]  4th Floor  [facility] [id number] [phone]            May 01, 2018  9:30  AM CDT   Return Visit with Brayan Mcclain DPM   [facility]    [address] [facility] [address]   [phone]            May 08, 2018  9:30 AM CDT   Return Visit with Brayan Mcclain DPM   [facility] ([facility])    [address] [facility] [address]   [phone]            May 22, 2018  1:30 PM CDT   Return Visit with Rubio Chinchilla MD   [facility] ([facility])    [address] 2nd Floor  [facility] [address]   [phone]            Jun 20, 2018 10:30 AM CDT   RETURN RETINA with Jen Aranda MD   [facility] ([facility])    [address] [id number] [phone]              Further instructions from your care team       Discharge Instructions after Colonoscopy  or Sigmoidoscopy  Today you had a ___x_ Colonoscopy ____ Sigmoidoscopy    Activity and Diet  You were given medicine for pain. You may be dizzy or sleepy.  For 24 hours:    Do not drive or use heavy equipment.    Do not make important decisions.    Do not drink any alcohol.  You may return to your normal diet and medicines.    Discomfort    Air was placed in your colon during the exam in order to see it. Walking helps to pass the air.    You may take Tylenol (acetaminophen) for pain unless your doctor has told you not to.      Follow-up      When to call:    Call right away if you have:    Unusual pain in belly or chest pain not relieved with passing air.    More than 1 to 2 Tablespoons of bleeding from your rectum.    Fever above 100.6  F (37.5  C).    If you have severe pain, bleeding, or shortness of breath, go to an emergency room.    If you have questions, call:  Monday to Friday, 7 a.m. to 4:30 p.m.  Endoscopy: [phone] (We may have to call you back)    After hours  Hospital:  [phone] (Ask for the GI fellow on call)    Pending Results     No orders found from 4/16/2018 to 4/19/2018.            Admission Information     Date & Time Provider Department Dept. Phone    4/18/2018 Rickie Gautam MD [facility] [place], Endoscopy [phone]      Your Vitals Were     Blood Pressure Respirations Pulse Oximetry             131/70 12 99%         MyChart Information     [employer] gives you secure access to your electronic health record. If you see a primary care provider, you can also send messages to your care team and make appointments. If you have questions, please call your primary care clinic.  If you do not have a primary care provider, please call [phone] and they will assist you.        Care EveryWhere ID     This is your Care EveryWhere ID. This could be used by other organizations to access your [facility] medical records  [id number]        Equal Access to Services     SAMSON MELTON : Hadii niurka Bedoya, waaxkylee luroxanna, qaybta kaalmada jose d, yolanda duran . So [facility] [phone].    ATENCIÓN: Si habla español, tiene a rodrigues disposición servicios gratuitos de asistencia lingüística. Llame al [phone].    We comply with applicable federal civil rights laws and [place] laws. We do not discriminate on the basis of race, color, national origin, age, disability, sex, sexual orientation, or gender identity.               Review of your medicines      UNREVIEWED medicines. Ask your doctor about these medicines        Dose / Directions    ammonium lactate 12 % cream   Commonly known as:  LAC-HYDRIN   Used for:  Venous stasis, Type 2 diabetes, controlled, with neuropathy (H)        Apply topically 2 times daily as needed for dry skin   Quantity:  385 g   Refills:  3       ascorbic acid 500 MG Tabs   Used for:  Ulcer of right lower leg, with fat layer exposed (H)        Dose:  500 mg   Take 1 tablet (500 mg) by mouth 2 times daily   Quantity:   30 tablet   Refills:  0       ASPIRIN PO        Dose:  81 mg   Take 81 mg by mouth daily   Refills:  0       clopidogrel 75 MG tablet   Commonly known as:  PLAVIX   Used for:  Peripheral vascular disease, unspecified        Dose:  75 mg   Take 1 tablet (75 mg) by mouth daily   Quantity:  30 tablet   Refills:  11       COLACE PO        Dose:  100 mg   Take 100 mg by mouth daily   Refills:  0       ferrous sulfate 325 (65 Fe) MG tablet   Commonly known as:  IRON   Used for:  Peripheral vascular disease, unspecified        Dose:  325 mg   Take 1 tablet (325 mg) by mouth 2 times daily   Quantity:  60 tablet   Refills:  11       gentamicin 0.1 % cream   Commonly known as:  GARAMYCIN   Used for:  Ulcer of right lower leg, with fat layer exposed (H), Chronic venous hypertension with ulcer involving right side (H), Type 2 diabetes, controlled, with neuropathy (H)        Apply topically daily To right leg ulcer.   Quantity:  30 g   Refills:  5       insulin glargine 100 UNIT/ML injection   Commonly known as:  LANTUS SOLOSTAR   Used for:  Type 2 diabetes mellitus with diabetic peripheral angiopathy without gangrene, with long-term current use of insulin (H)        Dose:  23 Units   Inject 23 Units Subcutaneous daily (with dinner) May take up to 25 units daily   Quantity:  3 mL   Refills:  3       LISINOPRIL PO        Dose:  20 mg   Take 20 mg by mouth 2 times daily   Refills:  0       nateglinide 120 MG tablet   Commonly known as:  STARLIX   Used for:  Type 2 diabetes, controlled, with neuropathy (H)        TAKE 1 TABLET BY MOUTH THREE TIMES DAILY BEFORE MEALS   Quantity:  90 tablet   Refills:  11       sildenafil 50 MG tablet   Commonly known as:  VIAGRA   Used for:  Vasculogenic erectile dysfunction, unspecified vasculogenic erectile dysfunction type        Dose:  50 mg   Take 1 tablet (50 mg) by mouth daily as needed for erectile dysfunction   Quantity:  10 tablet   Refills:  11       silver sulfADIAZINE 1 % cream    Commonly known as:  SILVADENE   Used for:  Ulcer of right lower leg, with fat layer exposed (H), Chronic venous hypertension with ulcer involving right side (H), Type 2 diabetes, controlled, with neuropathy (H)        Apply topically daily To affected areas on right foot and leg.   Quantity:  85 g   Refills:  5       simvastatin 10 MG tablet   Commonly known as:  ZOCOR   Used for:  Type 2 diabetes, controlled, with neuropathy (H)        Dose:  10 mg   Take 1 tablet (10 mg) by mouth At Bedtime   Quantity:  90 tablet   Refills:  3       * sitagliptin 100 MG tablet   Commonly known as:  JANUVIA   Used for:  Type 2 diabetes, controlled, with neuropathy (H)        Dose:  100 mg   Take 1 tablet (100 mg) by mouth daily   Quantity:  90 tablet   Refills:  3       * sitagliptin 100 MG tablet   Commonly known as:  JANUVIA   Used for:  Type 2 diabetes, HbA1c goal < 7% (H)        Dose:  100 mg   Take 1 tablet (100 mg) by mouth daily   Quantity:  90 tablet   Refills:  1       triamcinolone 0.1 % cream   Commonly known as:  KENALOG   Used for:  Dermatitis of left foot        Apply sparingly to left heel daily.   Quantity:  30 g   Refills:  1       VITAMIN C PO        Dose:  1000 mg   Take 1,000 mg by mouth   Refills:  0       VITAMIN D (CHOLECALCIFEROL) PO        Dose:  1000 Units   Take 1,000 Units by mouth 2 times daily   Refills:  0       * Notice:  This list has 2 medication(s) that are the same as other medications prescribed for you. Read the directions carefully, and ask your doctor or other care provider to review them with you.      CONTINUE these medicines which have NOT CHANGED        Dose / Directions    blood glucose monitoring lancets   Used for:  Type 2 diabetes, uncontrolled, with neuropathy (H)        Use to test blood sugars 2 as directed.   Quantity:  3 Box   Refills:  3       insulin pen needle 31G X 8 MM   Commonly known as:  B-D U/F   Used for:  Diabetes mellitus, type II (H)        Use 1 daily o as  "directed   Quantity:  100 each   Refills:  3       ONETOUCH ULTRA test strip   Used for:  Type 2 diabetes mellitus (H)   Generic drug:  blood glucose monitoring        USE TO TEST TWICE DAILY OR AS DIRECTED   Quantity:  200 strip   Refills:  3       OPTIFOAM 6\"X6\" Pads   Used for:  Ulcer of right leg, with fat layer exposed (H)        Dose:  1 Box   1 Box once a week   Quantity:  1 each   Refills:  6       order for DME   Used for:  Varicose veins of lower extremities with other complications, Venous stasis ulcer of right lower extremity (H)        Please measure and distribute 1 pair of 20mm Hg - 30mm Hg knee high ULCER CARE open or closed toe compression stockings.  Patient has a size 13 foot and please take this into consideration.  Jobst or equivalent   Quantity:  2 each   Refills:  1       order for DME   Used for:  Varicose veins of both lower extremities with complications        Please measure and distribute 1 pair of 20mmHg - 30mmHg knee high open or closed toe compression stockings. Jobst ultrasheer or equivalent.   Quantity:  3 each   Refills:  12       order for DME   Used for:  Varicose veins of bilateral lower extremities with other complications        Please measure and distribute 1 pair of 30mmHg - 40mmHg knee high open toe ulcercare compression stockings. Jobst ultrasheer or equivalent.   Quantity:  2 each   Refills:  6                Protect others around you: Learn how to safely use, store and throw away your medicines at www.disposemymeds.org.             Medication List: This is a list of all your medications and when to take them. Check marks below indicate your daily home schedule. Keep this list as a reference.      Medications           Morning Afternoon Evening Bedtime As Needed    ammonium lactate 12 % cream   Commonly known as:  LAC-HYDRIN   Apply topically 2 times daily as needed for dry skin                                ascorbic acid 500 MG Tabs   Take 1 tablet (500 mg) by mouth 2 " "times daily                                ASPIRIN PO   Take 81 mg by mouth daily                                blood glucose monitoring lancets   Use to test blood sugars 2 as directed.                                clopidogrel 75 MG tablet   Commonly known as:  PLAVIX   Take 1 tablet (75 mg) by mouth daily                                COLACE PO   Take 100 mg by mouth daily                                ferrous sulfate 325 (65 Fe) MG tablet   Commonly known as:  IRON   Take 1 tablet (325 mg) by mouth 2 times daily                                gentamicin 0.1 % cream   Commonly known as:  GARAMYCIN   Apply topically daily To right leg ulcer.                                insulin glargine 100 UNIT/ML injection   Commonly known as:  LANTUS SOLOSTAR   Inject 23 Units Subcutaneous daily (with dinner) May take up to 25 units daily                                insulin pen needle 31G X 8 MM   Commonly known as:  B-D U/F   Use 1 daily o as directed                                LISINOPRIL PO   Take 20 mg by mouth 2 times daily                                nateglinide 120 MG tablet   Commonly known as:  STARLIX   TAKE 1 TABLET BY MOUTH THREE TIMES DAILY BEFORE MEALS                                ONETOUCH ULTRA test strip   USE TO TEST TWICE DAILY OR AS DIRECTED   Generic drug:  blood glucose monitoring                                OPTIFOAM 6\"X6\" Pads   1 Box once a week                                order for DME   Please measure and distribute 1 pair of 20mm Hg - 30mm Hg knee high ULCER CARE open or closed toe compression stockings.  Patient has a size 13 foot and please take this into consideration.  Jobst or equivalent                                order for DME   Please measure and distribute 1 pair of 20mmHg - 30mmHg knee high open or closed toe compression stockings. Jobst ultrasheer or equivalent.                                order for DME   Please measure and distribute 1 pair of 30mmHg - 40mmHg " knee high open toe ulcercare compression stockings. Jobst ultrasheer or equivalent.                                sildenafil 50 MG tablet   Commonly known as:  VIAGRA   Take 1 tablet (50 mg) by mouth daily as needed for erectile dysfunction                                silver sulfADIAZINE 1 % cream   Commonly known as:  SILVADENE   Apply topically daily To affected areas on right foot and leg.                                simvastatin 10 MG tablet   Commonly known as:  ZOCOR   Take 1 tablet (10 mg) by mouth At Bedtime                                * sitagliptin 100 MG tablet   Commonly known as:  JANUVIA   Take 1 tablet (100 mg) by mouth daily                                * sitagliptin 100 MG tablet   Commonly known as:  JANUVIA   Take 1 tablet (100 mg) by mouth daily                                triamcinolone 0.1 % cream   Commonly known as:  KENALOG   Apply sparingly to left heel daily.                                VITAMIN C PO   Take 1,000 mg by mouth                                VITAMIN D (CHOLECALCIFEROL) PO   Take 1,000 Units by mouth 2 times daily                                * Notice:  This list has 2 medication(s) that are the same as other medications prescribed for you. Read the directions carefully, and ask your doctor or other care provider to review them with you.

## 2018-04-20 ENCOUNTER — HOSPITAL ENCOUNTER (OUTPATIENT)
Dept: CARDIAC REHAB | Facility: CLINIC | Age: 73
End: 2018-04-20
Attending: INTERNAL MEDICINE
Payer: MEDICARE

## 2018-04-20 PROCEDURE — 40000116 ZZH STATISTIC OP CR VISIT

## 2018-04-20 PROCEDURE — 93798 PHYS/QHP OP CAR RHAB W/ECG: CPT

## 2018-04-23 ENCOUNTER — HOSPITAL ENCOUNTER (OUTPATIENT)
Dept: CARDIAC REHAB | Facility: CLINIC | Age: 73
End: 2018-04-23
Attending: INTERNAL MEDICINE
Payer: MEDICARE

## 2018-04-23 PROCEDURE — 40000116 ZZH STATISTIC OP CR VISIT: Performed by: OCCUPATIONAL THERAPIST

## 2018-04-23 PROCEDURE — 93798 PHYS/QHP OP CAR RHAB W/ECG: CPT | Performed by: OCCUPATIONAL THERAPIST

## 2018-04-24 ENCOUNTER — OFFICE VISIT (OUTPATIENT)
Dept: PODIATRY | Facility: CLINIC | Age: 73
End: 2018-04-24
Payer: MEDICARE

## 2018-04-24 VITALS — OXYGEN SATURATION: 98 % | HEART RATE: 96 BPM | DIASTOLIC BLOOD PRESSURE: 62 MMHG | SYSTOLIC BLOOD PRESSURE: 138 MMHG

## 2018-04-24 DIAGNOSIS — I87.8 VENOUS STASIS: ICD-10-CM

## 2018-04-24 DIAGNOSIS — L97.912 ULCER OF RIGHT LOWER EXTREMITY WITH FAT LAYER EXPOSED (H): Primary | ICD-10-CM

## 2018-04-24 DIAGNOSIS — E11.49 TYPE II OR UNSPECIFIED TYPE DIABETES MELLITUS WITH NEUROLOGICAL MANIFESTATIONS, NOT STATED AS UNCONTROLLED(250.60) (H): ICD-10-CM

## 2018-04-24 DIAGNOSIS — L97.512 SKIN ULCER OF RIGHT FOOT WITH FAT LAYER EXPOSED (H): ICD-10-CM

## 2018-04-24 PROCEDURE — 99213 OFFICE O/P EST LOW 20 MIN: CPT | Performed by: PODIATRIST

## 2018-04-24 NOTE — PATIENT INSTRUCTIONS
Thanks for coming today.  Ortho/Sports Medicine Clinic  [address] [place] [address]    To schedule future appointments in Ortho Clinic, you may call [phone].    To schedule ordered imaging by your Provider: Call [facility] at [phone]    [employer] available online at:   [email or web address]    Please call if any further questions or concerns [phone] and ask for the Orthopedic Department. Clinic hours 8 am to 5 pm.    Return to clinic if symptoms worsen.
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
## 2018-04-24 NOTE — MR AVS SNAPSHOT
After Visit Summary   4/24/2018    Amos Walker    MRN: [id number]           Patient Information     Date Of Birth          1945        Visit Information        Provider Department      4/24/2018 9:30 AM Brayan Mcclain DPM [facility]        Today's Diagnoses     Ulcer of right lower extremity with fat layer exposed (H)    -  1    Venous stasis        Type II or unspecified type diabetes mellitus with neurological manifestations, not stated as uncontrolled(250.60) (H)        Skin ulcer of right foot with fat layer exposed (H)          Care Instructions    Thanks for coming today.  [facility]  [address] [place] [address]    To schedule future appointments in Ortho Clinic, you may call [phone].    To schedule ordered imaging by your Provider: Call [facility] at [phone]    [employer] available online at:   [employer]    Please call if any further questions or concerns [phone] and ask for the Orthopedic Department. Clinic hours 8 am to 5 pm.    Return to clinic if symptoms worsen.            Follow-ups after your visit        Your next 10 appointments already scheduled     Apr 25, 2018  3:00 PM CDT   Cardiac Treatment with Ur Cardiac Rehab 1   [facility], Cardiac Rehabilitation ([facility])    [address] [place] [address]   [phone]            Apr 26, 2018  4:30 PM CDT   Cardiac Treatment with Ur Cardiac Rehab 1   [facility], Cardiac Rehabilitation ([facility])    [address] [place] [address] [address] [address]   [phone]            Apr 27, 2018 10:25 AM CDT   (Arrive by 10:10 AM)   Return Visit with Racheal Swift MD   [facility] ([facility])    [address] [place] [address]  [facility] [address]   [phone]            Apr 30, 2018  3:00 PM CDT   Cardiac Treatment with Ur Cardiac Rehab 1   [facility], Cardiac Rehabilitation ([facility])    [address] [place] [address] 1st Floor [address] [address]   [phone]            May 01, 2018  9:30 AM CDT   Return Visit with Brayan Mcclain DPM   [facility] ([facility])    [address] [place] [address]   [phone]            May 02, 2018  3:00 PM CDT   Cardiac Treatment with Ur Cardiac Rehab 1   [facility], Cardiac Rehabilitation ([facility])    [address] [place] [address]   [phone]            May 04, 2018  3:00 PM CDT   Cardiac Treatment with Ur Cardiac Rehab 1   [facility], Cardiac Rehabilitation ([facility])    [address] [place] [address]   [phone]            May 08, 2018  9:30 AM CDT   Return Visit with Brayan Mcclain DPM   [facility] ([facility])    [address] [place] [address]   [phone]            May 15, 2018  8:30 AM CDT   Return Visit with Brayan Mcclain DPM   [facility] ([facility])    [address] [place] [address]   [phone]            May 22, 2018  1:30 PM CDT   Return Visit with Rubio Chinchilla MD   [facility] ([facility])    [address] [place] [address] 2nd Floor  [facility] [address]   [phone]              Who to contact     If you have questions or need follow up information about today's clinic visit or your schedule please contact [facility] directly at [phone].  Normal or non-critical  lab and imaging results will be communicated to you by [employer], letter or phone within 4 business days after the clinic has received the results. If you do not hear from us within 7 days, please contact the clinic through [employer] or phone. If you have a critical or abnormal lab result, we will notify you by phone as soon as possible.  Submit refill requests through [employer] or call your pharmacy and they will forward the refill request to us. Please allow 3 business days for your refill to be completed.          Additional Information About Your Visit        [employer] Information     [employer] gives you secure access to your electronic health record. If you see a primary care provider, you can also send messages to your care team and make appointments. If you have questions, please call your primary care clinic.  If you do not have a primary care provider, please call [phone] and they will assist you.      [employer] is an electronic gateway that provides easy, online access to your medical records. With [employer], you can request a clinic appointment, read your test results, renew a prescription or communicate with your care team.     To access your existing account, please contact your [facility] or call [phone] for assistance.        Care EveryWhere ID     This is your Care EveryWhere ID. This could be used by other organizations to access your [facility] medical records  [id number]        Your Vitals Were     Pulse Pulse Oximetry                96 98%           Blood Pressure from Last 3 Encounters:   04/24/18 138/62   04/18/18 131/70   04/10/18 136/62    Weight from Last 3 Encounters:   03/29/18 77.1 kg (170 lb)   03/19/18 77.1 kg (170 lb)   03/15/18 76.7 kg (169 lb)              Today, you had the following     No orders found for display         Today's Medication Changes          These changes are accurate as of 4/24/18  4:10 PM.  If you have any questions, ask your nurse or  doctor.               These medicines have changed or have updated prescriptions.        Dose/Directions    clopidogrel 75 MG tablet   Commonly known as:  PLAVIX   This may have changed:  when to take this   Used for:  Peripheral vascular disease, unspecified        Dose:  75 mg   Take 1 tablet (75 mg) by mouth daily   Quantity:  30 tablet   Refills:  11       gentamicin 0.1 % cream   Commonly known as:  GARAMYCIN   This may have changed:    - when to take this  - reasons to take this  - additional instructions   Used for:  Ulcer of right lower leg, with fat layer exposed (H), Chronic venous hypertension with ulcer involving right side (H), Type 2 diabetes, controlled, with neuropathy (H)        Apply topically daily To right leg ulcer.   Quantity:  30 g   Refills:  5                Primary Care Provider Office Phone # Fax #    Racheal Swift [phone]       [address] [address]        Equal Access to Services     SAMSON MELTON : Nataliia Bedoya, wabruno abebe, qacristopher martinalchino jeffery, yolanda duran . So [facility] [phone].    ATENCIÓN: Si habla español, tiene a rodrigues disposición servicios gratuitos de asistencia lingüística. [facility] [place] [phone].    We comply with applicable federal civil rights laws and [place] laws. We do not discriminate on the basis of race, color, national origin, age, disability, sex, sexual orientation, or gender identity.            Thank you!     Thank you for choosing [facility]  for your care. Our goal is always to provide you with excellent care. Hearing back from our patients is one way we can continue to improve our services. Please take a few minutes to complete the written survey that you may receive in the mail after your visit with us. Thank you!             Your Updated Medication List - Protect others around you: Learn how to safely use, store and throw away your  medicines at www.disposemymeds.org.          This list is accurate as of 4/24/18  4:10 PM.  Always use your most recent med list.                   Brand Name Dispense Instructions for use Diagnosis    ammonium lactate 12 % cream    LAC-HYDRIN    385 g    Apply topically 2 times daily as needed for dry skin    Venous stasis, Type 2 diabetes, controlled, with neuropathy (H)       ascorbic acid 500 MG Tabs     30 tablet    Take 1 tablet (500 mg) by mouth 2 times daily    Ulcer of right lower leg, with fat layer exposed (H)       ASPIRIN PO      Take 81 mg by mouth daily        blood glucose monitoring lancets     3 Box    Use to test blood sugars 2 as directed.    Type 2 diabetes, uncontrolled, with neuropathy (H)       clopidogrel 75 MG tablet    PLAVIX    30 tablet    Take 1 tablet (75 mg) by mouth daily    Peripheral vascular disease, unspecified       COLACE PO      Take 100 mg by mouth daily        ferrous sulfate 325 (65 Fe) MG tablet    IRON    60 tablet    Take 1 tablet (325 mg) by mouth 2 times daily    Peripheral vascular disease, unspecified       gentamicin 0.1 % cream    GARAMYCIN    30 g    Apply topically daily To right leg ulcer.    Ulcer of right lower leg, with fat layer exposed (H), Chronic venous hypertension with ulcer involving right side (H), Type 2 diabetes, controlled, with neuropathy (H)       insulin glargine 100 UNIT/ML injection    LANTUS SOLOSTAR    3 mL    Inject 23 Units Subcutaneous daily (with dinner) May take up to 25 units daily    Type 2 diabetes mellitus with diabetic peripheral angiopathy without gangrene, with long-term current use of insulin (H)       insulin pen needle 31G X 8 MM    B-D U/F    100 each    Use 1 daily o as directed    Diabetes mellitus, type II (H)       LISINOPRIL PO      Take 20 mg by mouth 2 times daily        nateglinide 120 MG tablet    STARLIX    90 tablet    TAKE 1 TABLET BY MOUTH THREE TIMES DAILY BEFORE MEALS    Type 2 diabetes, controlled, with  "neuropathy (H)       ONETOUCH ULTRA test strip   Generic drug:  blood glucose monitoring     200 strip    USE TO TEST TWICE DAILY OR AS DIRECTED    Type 2 diabetes mellitus (H)       OPTIFOAM 6\"X6\" Pads     1 each    1 Box once a week    Ulcer of right leg, with fat layer exposed (H)       order for DME     2 each    Please measure and distribute 1 pair of 20mm Hg - 30mm Hg knee high ULCER CARE open or closed toe compression stockings.  Patient has a size 13 foot and please take this into consideration.  Jobst or equivalent    Varicose veins of lower extremities with other complications, Venous stasis ulcer of right lower extremity (H)       order for DME     3 each    Please measure and distribute 1 pair of 20mmHg - 30mmHg knee high open or closed toe compression stockings. Jobst ultrasheer or equivalent.    Varicose veins of both lower extremities with complications       order for DME     2 each    Please measure and distribute 1 pair of 30mmHg - 40mmHg knee high open toe ulcercare compression stockings. Jobst ultrasheer or equivalent.    Varicose veins of bilateral lower extremities with other complications       sildenafil 50 MG tablet    VIAGRA    10 tablet    Take 1 tablet (50 mg) by mouth daily as needed for erectile dysfunction    Vasculogenic erectile dysfunction, unspecified vasculogenic erectile dysfunction type       silver sulfADIAZINE 1 % cream    SILVADENE    85 g    Apply topically daily To affected areas on right foot and leg.    Ulcer of right lower leg, with fat layer exposed (H), Chronic venous hypertension with ulcer involving right side (H), Type 2 diabetes, controlled, with neuropathy (H)       simvastatin 10 MG tablet    ZOCOR    90 tablet    Take 1 tablet (10 mg) by mouth At Bedtime    Type 2 diabetes, controlled, with neuropathy (H)       * sitagliptin 100 MG tablet    JANUVIA    90 tablet    Take 1 tablet (100 mg) by mouth daily    Type 2 diabetes, controlled, with neuropathy (H)       * " sitagliptin 100 MG tablet    JANUVIA    90 tablet    Take 1 tablet (100 mg) by mouth daily    Type 2 diabetes, HbA1c goal < 7% (H)       triamcinolone 0.1 % cream    KENALOG    30 g    Apply sparingly to left heel daily.    Dermatitis of left foot       VITAMIN C PO      Take 1,000 mg by mouth        VITAMIN D (CHOLECALCIFEROL) PO      Take 1,000 Units by mouth 2 times daily        * Notice:  This list has 2 medication(s) that are the same as other medications prescribed for you. Read the directions carefully, and ask your doctor or other care provider to review them with you.

## 2018-04-24 NOTE — NURSING NOTE
"Amos Walker's goals for this visit include: Wound check   He requests these members of his care team be copied on today's visit information: yes    PCP: Racheal Swift    Referring Provider:  Referred Self, MD  No address on file    Chief Complaint   Patient presents with     RECHECK     wound check       Initial /62  Pulse 96  SpO2 98% Estimated body mass index is 21.82 kg/(m^2) as calculated from the following:    Height as of 3/29/18: 1.88 m (6' 2.02\").    Weight as of 3/29/18: 77.1 kg (170 lb).  Medication Reconciliation: complete    "
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
## 2018-04-25 ENCOUNTER — HOSPITAL ENCOUNTER (OUTPATIENT)
Dept: CARDIAC REHAB | Facility: CLINIC | Age: 73
End: 2018-04-25
Attending: INTERNAL MEDICINE
Payer: MEDICARE

## 2018-04-25 PROCEDURE — 40000116 ZZH STATISTIC OP CR VISIT: Performed by: REHABILITATION PRACTITIONER

## 2018-04-25 PROCEDURE — 93798 PHYS/QHP OP CAR RHAB W/ECG: CPT | Performed by: REHABILITATION PRACTITIONER

## 2018-04-26 ENCOUNTER — HOSPITAL ENCOUNTER (OUTPATIENT)
Dept: CARDIAC REHAB | Facility: CLINIC | Age: 73
End: 2018-04-26
Attending: INTERNAL MEDICINE
Payer: MEDICARE

## 2018-04-26 VITALS — BODY MASS INDEX: 21.56 KG/M2 | HEIGHT: 74 IN | WEIGHT: 168 LBS

## 2018-04-26 PROCEDURE — 40000116 ZZH STATISTIC OP CR VISIT

## 2018-04-26 PROCEDURE — 93798 PHYS/QHP OP CAR RHAB W/ECG: CPT

## 2018-04-26 ASSESSMENT — 6 MINUTE WALK TEST (6MWT)
GENDER SELECTION: MALE
MALE CALC: 2061.04
TOTAL DISTANCE WALKED (FT): 1156
PREDICTED: 2073.6
FEMALE CALC: 1588.2

## 2018-04-26 NOTE — PROGRESS NOTES
Physician cosignature/electronic signature indicates approval of this ITP document. I have established, reviewed and made necessary changes to the individualized treatment plan and exercise prescription for this patient.   04/26/18 1600   Session   Session 60 Day Individualized Treatment Plan   Certified through this date 05/30/18   Cardiac Rehab Assessment   Cardiac Rehab Assessment Amos Walker is a 72 year old gentleman with a history of PAD, and CAD presenting with two vessel CAD, and underwent coronary angiogram on 3/1 which demonstrated two vessel disease, however, d/t contrast load already given, and needed to wait until 3/8 for successful percutaneous coronary intervention of the left main, mid and proximal LAD, proximal and mid RCA. 3/29 Patient feels he is beginning to have more stamina walking. He has been relying on his cane less, but carrying with if he needs to use it. Currently at 2.7 METs and has attended 5 sessions. Skilled therapy is recommended to monitor CV resonse to exercise, to provide education on exercise principles and risk factors of smoking and support to acheive goals. 4/26 Pt is no longer using can or bringing it to rehab. He has attended 14 rehab sessions and feels he continues to gain strength in his legs. He is still smoking 0.5 ppd, but states he enjoys it less. Continued skilled therapy is recommended to monitor CV response to exercise, and to continue to provide education on introducing home exercise.    General Information   Treatment Diagnosis Stent   Date of Treatment Diagnosis 03/08/18   Significant Past CV History PAD   Comorbidities PAD;DM   Other Medical History .[facility]   Hospital Location [facility]   Hospital Discharge Date 03/08/18   Signs and Symptoms Post Hospital Discharge Fatigue   Outpatient Cardiac Rehab Start Date 03/15/18   Primary Physician Racheal Swift   Primary Physician Follow Up Completed   Surgeon Ghanshyam Moore MD  "  Cardiologist Dr. Chinchilla   Ejection Fraction 51%   Risk Stratification Low   Summary of Cath Report   Summary of Cath Report Available   Date Performed 03/01/18   Left Main 50-60%   LAD a long 50-60% proximal LAD stenosis, long 60% mLAD stenosis    LCX Prox 25-50%, mLCX 25-50, pLCX 50%   RCA pRCA 50-60% & mRCA 70%   Cath Report Comments Two vessel coronary artery disease with left main involvement.   Living and Work Status    Living Arrangements and Social Status [employer] Live with an adult   Return to Employment Retired   Occupation    Preventative Medications   CMS recommended medications Ace inhibitors;Antiplatelets;Lipid Lowering;Influenza vaccination;Pneumonia vaccination   Fall Risk Screen   Fall screen completed by Cardiac Rehab   Have you fallen 2 or more times in the past year? Yes   Have you fallen and had an injury in the past year? Yes  (Pt broke hip in 8/17)   Timed Up and Go score (seconds) 1st 13.45, 2nd 11.88   Fall screen comments Pt was seen my physical therapy in the fall of 2017   Pain   Patient Currently in Pain No   Physical Assessments   Incisions WNL   Edema None   Right Lung Sounds not assessed   Left Lung Sounds not assessed   Limitations Orthopedic   Comments Pt had hip surgery in 8/217   Individualized Treatment Plan   Monitored Sessions Scheduled 24   Monitored Sessions Attended 14   Oxygen   Supplemental Oxygen needed No   Nutrition Management - Weight Management   Assessment Re-assessment   Age 72   Weight 76.2 kg (168 lb)   Height 1.88 m (6' 2.02\")   BMI (Calculated) 21.61   Initial Rate Your Plate Score. Dietary tool to assess eating patterns. Scores range from 24 to 72. The higher the score the healthier the eating pattern. 64   Weight Management Comments 4/26 Pt trying to gain wt lost after hip surgery. He states  may reduce BP medication until he gains more weight.   Nutrition Management - Lipids   Lipids Labs Available   Date 10/25/17   Total " Cholesterol 92   Triglycerides 100   HDL 41   LDL 30   Prescribed Lipid Medication Yes   Statin Intensity Low Intensity   Nutrition Management - Diabetes   Diabetes Type II   Do you Monitor BS at Home? Other (see comments)  (Pt has started to check check BS more regular)   Diabetes Medication Prescribed Yes, Insulin;Yes, Oral Medication   Hb A1C Date: 10/25/17   Hb A1C Result: 6.5   Nutrition Management Summary   Dietary Recommendations Low Fat;Low Cholesterol;Low Sodium   Stages of Change for Diet Compliance Preparation   Interventions Planned Attend Nutrition Education Class(es)   Psychosocial Management   Psychosocial Assessment Re-assessment   Is there history of clinical depression or increased risk of depression? No previous history   Current Level of Stress per Patient Report Moderate    Current Coping Skills Other (see comments)  (Prayer and smoking)   Initial Patient Health Questionnaire -9 Score (PHQ-9) for depression. 5-9 Minimal symptoms, 10-14 Minor depression, 15-19 Major depression, moderately severe, > 20 Major depression, severe  3   Initial [facility] Survey score.  Quality of Life:   If total score > 25 review individual areas where patient rated a 4 or 5.  Consider patients current medical condition and what role that plays on the score.   Adjust treatment protocol to improve areas of concern.  Consider the following:  PHQ9 score, DASI, and re-assessment within the next 30 days to assist with developing treatments.  21   Stages of Change Preparation   Interventions Planned Patient denies need for intervention at this time.   Other Core Components - Hypertension   History of or Diagnosis of Hypertension Yes   Currently taking Anti-Hypertensives Yes;Ace Inhibitor   Other Core Components - Tobacco   History of Tobacco Use Yes   Tobacco Use Status Currently smoking - everyday   Tobacco Habit Cigarettes   Tobacco Use per Day (average) (<0.5ppd)   Years of Tobacco Use 55   Stages of Change  Contemplation   Tobacco Comments 4/26 Pt not ready to set quit date, but notes he is smoking 10 cigs a day and enjoys it less.   Other Core Components Summary   Interventions Planned Educate on benefits of smoking cessation   Activity/Exercise History   Activity/Exercise Assessment Re-assessment   Activity/Exercise Status prior to event? Sedentary   Number of Days Currently participating in Moderate Physical Activity? 5   Number of Days Currently performing  Aerobic Exercise (including rehab)? 3   Number of Minutes per Session Currently of Aerobic Exercise (average)? 0   Current Stage of Change (Physical Activity) Action   Current Stage of Change (Aerobic Exercise) Action   Patient Goals Goal #1;Goal #2   Goal #1 Description Pt will increae his strength and endurance to be able to walk 10 minutes without a cane   Goal #1 Target Date 04/29/18   Goal #1 Date Met 04/26/18   Goal #1 Progress Towards Goal 4/26 Pt is walking to/from rehab without can. He is no longer bringing it. He notes he is picking up his feel better and not dragging his feet as much.    Goal #2 Description Pt will have the confidence to be able to resume walking 40 minutes 3-5x per week.   Goal #2 Target Date 05/14/18   Goal #2 Progress Towards Goal 4/26 Pt still working on confidence in walking 40 min. He is still walking 20 min outside of rehab 2 days/week. Pt also plans to start using 5 pound weights at home 1 extra day a week.   Activity/Exercise Comments Pt was walking 40 minutes a day 4-5x a week up until he broke his hip in August   Exercise Assessment   6 Minute Walk Predicted - Gender Selection Male   6 Minute Walk Predicted (Male) 2061.04   6 Minute Walk Predicted (Female) 1588.2   Initial 6 Minute Walk Distance (Feet) 1156 ft   Resting HR 85 bpm  (vitals from 4/26)   Exercise  bpm   Post Exercise HR 89 bpm   Resting /50   Exercise /54   Post Exercise /48   Pre  mg/dL   Post  mg/dL   Effort Rating 5    Current MET Level 2.8   MET Level Goal 5+   ECG Rhythm Normal sinus rhythm   Ectopy None   Current Symptoms Denies symptoms   Limitations/Restrictions None   Exercise Prescription   Mode Treadmill;Airdyne;Nustep;Weights   Duration/Time 15-30 min   Frequency 3 daysweek   THR (85% of age predicted max HR) 127.5   OMNI Effort Rating (0-10 Scale) 4-6/10   Progression Continuous bouts;Total exercise time of 20-30 minutes;Aerobic exercise to OMNI rating of 5-7, and heart rate at or below target;Progress peak intensity by 1/2 MET per week   Recommended Home Exercise   Type of Exercise Walking   Frequency (days per week) daily   Duration (minutes per session) 15-30 min   Effort Rating Recommended 4-6/10   Current Home Exercise   Type of Exercise Walking   Frequency (days per week) 2   Duration (minutes per session) 20   Follow-up/On-going Support   Provider follow-up needed on the following No follow-up needed   Learning Assessment   Learner Patient   Primary Language English   Preferred Learning Style Reading;Pictures/Video   Barriers to Learning Hearing   Patient Education   Education recommended Anatomy and Physiology of the Heart;Exercise Principles;Medication Overview;Nutrition;Risk Factors;Smoking Cessation   Education Comments 4/26 Gave pt schedule and class description, encouraged to come to classes of interest.

## 2018-04-27 ENCOUNTER — OFFICE VISIT (OUTPATIENT)
Dept: INTERNAL MEDICINE | Facility: CLINIC | Age: 73
End: 2018-04-27
Payer: MEDICARE

## 2018-04-27 ENCOUNTER — TELEPHONE (OUTPATIENT)
Dept: INTERNAL MEDICINE | Facility: CLINIC | Age: 73
End: 2018-04-27

## 2018-04-27 VITALS
OXYGEN SATURATION: 99 % | HEART RATE: 91 BPM | WEIGHT: 170 LBS | BODY MASS INDEX: 21.82 KG/M2 | SYSTOLIC BLOOD PRESSURE: 139 MMHG | DIASTOLIC BLOOD PRESSURE: 62 MMHG | RESPIRATION RATE: 18 BRPM

## 2018-04-27 DIAGNOSIS — E11.8 TYPE 2 DIABETES MELLITUS WITH COMPLICATION, WITHOUT LONG-TERM CURRENT USE OF INSULIN (H): Primary | ICD-10-CM

## 2018-04-27 DIAGNOSIS — E11.9 DIABETES MELLITUS (H): ICD-10-CM

## 2018-04-27 DIAGNOSIS — I73.9 PERIPHERAL VASCULAR DISEASE (H): ICD-10-CM

## 2018-04-27 DIAGNOSIS — N18.30 CKD (CHRONIC KIDNEY DISEASE) STAGE 3, GFR 30-59 ML/MIN (H): ICD-10-CM

## 2018-04-27 DIAGNOSIS — E11.8 TYPE 2 DIABETES MELLITUS WITH COMPLICATION, WITHOUT LONG-TERM CURRENT USE OF INSULIN (H): ICD-10-CM

## 2018-04-27 DIAGNOSIS — I10 ESSENTIAL HYPERTENSION: ICD-10-CM

## 2018-04-27 LAB
CREAT SERPL-MCNC: 1.09 MG/DL (ref 0.66–1.25)
ERYTHROCYTE [DISTWIDTH] IN BLOOD BY AUTOMATED COUNT: 13.5 % (ref 10–15)
GFR SERPL CREATININE-BSD FRML MDRD: 67 ML/MIN/1.7M2
HBA1C MFR BLD: 5.8 % (ref 0–6.4)
HCT VFR BLD AUTO: 33.5 % (ref 40–53)
HGB BLD-MCNC: 10.5 G/DL (ref 13.3–17.7)
MCH RBC QN AUTO: 30.4 PG (ref 26.5–33)
MCHC RBC AUTO-ENTMCNC: 31.3 G/DL (ref 31.5–36.5)
MCV RBC AUTO: 97 FL (ref 78–100)
PLATELET # BLD AUTO: 222 10E9/L (ref 150–450)
RBC # BLD AUTO: 3.45 10E12/L (ref 4.4–5.9)
WBC # BLD AUTO: 6.3 10E9/L (ref 4–11)

## 2018-04-27 NOTE — TELEPHONE ENCOUNTER
Please call patient:  His A1c was quite low--5.8.  I would recommend he stop januvia.  His kidney function was normal and his blood counts have improved slightly.  I would like to see him again in 3-4 weeks as we discussed with a log of his blood sugars and blood pressures.  Thanks,  Racheal Swift MD  Internal Medicine

## 2018-04-27 NOTE — Clinical Note
Dylon Swift,  Sorry if this is a duplicate message. My thoughts on labs are as follows.  - A1c 5.8, concerning for over control of DM. I would consider discontinuing Januvia as we wait to have more data on fasting blood sugars. I think we may need to adjust insulin, but as you said, we should be cautious about doing so without more information. Perhaps we should send a message to him to let us know if he has lows below 80 before he returns for follow up. - Hg 10.5 > 8.9, continues to be normocytic. Continue to monitor and recommend lung CT at next visit. - stable CR, no further workup indicated at this time

## 2018-04-27 NOTE — PATIENT INSTRUCTIONS
[facility] Phone Number [phone]  [facility] Medication Refill Request Information:  * Please contact your pharmacy regarding ANY request for medication refills.  ** [facility] Fax = [phone]  * Please allow 3 business days for routine medication refills.  * Please allow 5 business days for controlled substance medication refills.     [facility] Test Result notification information:  *You will be notified with in 7-10 days of your appointment day regarding the results of your test.  If you are on MyChart you will be notified as soon as the provider has reviewed the results and signed off on them.        Take 1/2 tab lisinopril 10 mg once a day.  Bring in meter to your next appointment.    Write down blood sugars before breakfast and dinner every day for one week.  Keep medication doses the same during this time.

## 2018-04-27 NOTE — NURSING NOTE
Chief Complaint   Patient presents with     Recheck Medication     pt is here for a medication follow up       Kayla Knott CMA at 10:10 AM on 4/27/2018

## 2018-04-27 NOTE — PROGRESS NOTES
Mr. Walker is a 73 year old male here for follow up.    History of Present Illness:  Concerned that he is overmedicated. Particularly concerned about diastolic blood pressure being low, as it has been in the 40s at CV rehab. Brought measurements from home, where it has been higher, usually <130 over 70-80s. Feels like he has dizziness and light sensitivity, which he associates with low blood pressure. Was taking 1/2 to 1/4 a tablet of lisinopril (20 mg/tablet), discontinued altogether 2 days ago.    Decreased Lantus to 23 units at last visit, then further to 22 units last week. Has only taken blood sugar once this week, with a fasting value of 106. Brought meter, which shows 15 total readings, averaging 139 over the past 30 days. High of 239, but thinks this was taken after eating breakfast. Feels dizzy and sees halos when his sugars are in the 70s.    Had colonoscopy in April. Identified 2 polyps (8 mm and 5 mm respectively) but no clear source of positive FIT testing. Plan to resect polyps when off plavix.    Thinks BM more regular since starting exercise program. Discontinued stool softener. No blood in stools.    Feels less fatigued and thinks his skin has more color to it compared to March, when he was first noted to have anemia. Weight is consistent at 170 lb, though decreased from 176 lb 12/17, and 186 lb in 2/17. Eats a light breakfast, skips lunch, and a light dinner. Dinner usually consists of vegetables and 6 oz of meat. He fasted more often and ate fish during Lent. Continues to supplement with 2 iron pills daily.    A full 10-pt Review of Systems was performed, verified and is negative except as documented in the HPI.  All health questionnaires were reviewed, verified and relevant information documented above.      Past Medical History:  Past Medical History:   Diagnosis Date     Anemia      CKD (chronic kidney disease) stage 3, GFR 30-59 ml/min      Heart disease      HTN (hypertension)       "Hyperlipidemia      MRSA cellulitis of right foot     in past.      PAD (peripheral artery disease) (H)     s/p stenting in R leg     Tobacco use     50+ pack     Type 2 diabetes mellitus (H)     for 25 yrs.  on insulin and starlix     Venous ulcer        Active Meds:  Current Outpatient Prescriptions   Medication     ammonium lactate (LAC-HYDRIN) 12 % cream     Ascorbic Acid (VITAMIN C PO)     ascorbic acid 500 MG TABS     ASPIRIN PO     blood glucose monitoring (FREESTYLE) lancets     clopidogrel (PLAVIX) 75 MG tablet     ferrous sulfate (IRON) 325 (65 FE) MG tablet     Gauze Pads & Dressings (OPTIFOAM) 6\"X6\" PADS     gentamicin (GARAMYCIN) 0.1 % cream     insulin glargine (LANTUS SOLOSTAR) 100 UNIT/ML injection     insulin pen needle (B-D U/F) 31G X 8 MM     nateglinide (STARLIX) 120 MG tablet     ONETOUCH ULTRA test strip     order for DME     order for DME     order for DME     sildenafil (VIAGRA) 50 MG tablet     silver sulfADIAZINE (SILVADENE) 1 % cream     simvastatin (ZOCOR) 10 MG tablet     sitagliptin (JANUVIA) 100 MG tablet     triamcinolone (KENALOG) 0.1 % cream     VITAMIN D, CHOLECALCIFEROL, PO     LISINOPRIL PO     [DISCONTINUED] sitagliptin (JANUVIA) 100 MG tablet     No current facility-administered medications for this visit.         Allergies:  Reviewed, refer to EMR    Relevant Social History:  , lives in nursing home, current smoker.    Physical Exam:  Vitals: /62  Pulse 91  Resp 18  Wt 77.1 kg (170 lb)  SpO2 99%  BMI 21.82 kg/m2  Constitutional: Alert, oriented, pleasant, no acute distress  Head: Normocephalic, atraumatic, wearing glasses  Eyes: No scleral icterus  Neck: Supple  Cardiovascular: Regular rate and rhythm, no murmurs, rubs or gallops  Respiratory: Good air movement bilaterally, lungs clear, no wheezes/rales/rhonchi  Musculoskeletal: No peripheral edema  Neurologic: Alert and oriented  Psychiatric: Normal mentation, affect and mood    Recent Labs:  Lab Results "   Component Value Date     03/08/2018      Lab Results   Component Value Date    POTASSIUM 5.1 03/08/2018     Lab Results   Component Value Date    CHLORIDE 104 03/08/2018     Lab Results   Component Value Date    CLAUDIA 7.8 03/08/2018     Lab Results   Component Value Date    CO2 22 03/08/2018     Lab Results   Component Value Date    BUN 26 03/08/2018     Lab Results   Component Value Date    CR 1.10 03/08/2018     Lab Results   Component Value Date     03/08/2018     Lab Results   Component Value Date    ALT 14 10/31/2016       Lab Results   Component Value Date    WBC 5.2 03/08/2018     Lab Results   Component Value Date    HGB 8.9 03/08/2018     Lab Results   Component Value Date    HCT 28.3 03/08/2018     Lab Results   Component Value Date    MCV 95 03/08/2018     Lab Results   Component Value Date     03/08/2018         Assessment and Plan:    73 year old male with a history of tobacco dependence, DM2, HTN, CKD, PAD, and chronic leg ulcers, and recent history of normocytic anemia and positive FIT, presents for follow up.    #Anemia  No known source of bleeding. Subjective improvement of fatigue, pallor. May be multifactorial/iatrogenic in light of recent procedures/antibiotics.  - CBC with platelets, Hg 10.5 with MCV 97, increased from Hg 8.9 (3/18)  - Consider lung CT for cancer screening at next visit, which has been offered previously    #Positive FIT  Colonoscopy results reassuring against colon cancer. Will continue to monitor.    #DM2 with neuropathy  Difficult to determine current control as patient does not measure blood glucose regularly. Last hemoglobin A1c 6.5 10/17. Will obtain today to determine whether blood glucose is too well controlled given risk of lows.  - Hemoglobin A1c 5.8 today  - Measure blood glucose twice daily (before breakfast and before dinner) for 1 week  - Okay to continue or discontinue Januvia as long as it is consistent. Will reassess based on numbers at next  visit.    #HTN  Discussed reduction of lisinopril to 1 tablet (20 mg) daily at last visit. Patient desires decreased lisinopril dose, has already experimented with taking 1/2 tabs or skipping doses. Did not take lisinopril today and blood pressure 139/62. However, patient has had diastolic pressures in the 40s while at CV rehab. Goal remains <130/80. Will need further data to assess appropriate dose of lisinopril.  - Take 1/2 tablet (10 mg) of lisinopril daily  - Measure BP at home, bring readings and BP monitor to next visit    #CKD  Stable.  - Creatinine 1.09 = 1.10 (3/18)    #Routine Health Maintenence:  Immunizations (zoster, pneumovax, flu, Tdap, Hep A/B):   Most Recent Immunizations   Administered Date(s) Administered     Influenza (High Dose) 3 valent vaccine 10/10/2017     Influenza (IIV3) PF 11/01/2013     Pneumo Conj 13-V (2010&after) 11/03/2014     Pneumococcal 23 valent 12/21/2015     TDAP Vaccine (Boostrix) 03/21/2016     Lipids:   Recent Labs   Lab Test  10/25/17   1309  10/31/16   1205   11/18/14   0853   CHOL  92  123   < >  110   HDL  41  41   < >  45   LDL  30  67   < >  45   TRIG  100  74   < >  100   CHOLHDLRATIO   --    --    --   2.4    < > = values in this interval not displayed.     PSA (50-75 yrs): No results found for: PSA   AAA Screening (65-75 yrs): CT 12/17, negative  Lung Ca Screening (>30 py 55-79 or >20 py 50-79 + RF): 12/16, rec annual screening  Colonoscopy (50-75 yrs): 4/18, flat 8 mm polyp in descending colon, 5 mm polyp in cecum, prominent fold in cecum, plan for repeat colonoscopy in 6 months when off plavix  HIV/HCV if risk factors: Nonreactive Hep C 6/16  Safety/Lifestyle: not discussed  Tob/EtOH: not disccused  Depression: not disccused  Advanced Directive: not disccused    Return to clinic: 3-4 weeks    Allie Cantu MS3    I, Allie Cantu MS3, am acting as the scribe for Racheal Swift MD. All aspects of the exam and documentation were approved by the attending  physician.    Attending Addendum:  The medical student acted as scribe.  The patient was seen and examined with medical student.  The history and physical were independently verified by myself.  The above documentation represents our joint assessment and plan.  Racheal Swift MD  Internal Medicine    40 min spent face to face, of which >50% time spent on counseling/coordinating care exclusive of any procedure time

## 2018-04-27 NOTE — MR AVS SNAPSHOT
After Visit Summary   4/27/2018    Amos Walker    MRN: [id number]           Patient Information     Date Of Birth          1945        Visit Information        Provider Department      4/27/2018 10:25 AM Racheal Swift MD [facility]        Today's Diagnoses     Type 2 diabetes mellitus with complication, without long-term current use of insulin (H)    -  1    Essential hypertension        CKD (chronic kidney disease) stage 3, GFR 30-59 ml/min          Care Instructions    Primary Care Center Phone Number [phone]  Primary Care Center Medication Refill Request Information:  * Please contact your pharmacy regarding ANY request for medication refills.  ** [facility] Prescription Fax = [phone]  * Please allow 3 business days for routine medication refills.  * Please allow 5 business days for controlled substance medication refills.     Primary Care Center Test Result notification information:  *You will be notified with in 7-10 days of your appointment day regarding the results of your test.  If you are on MyChart you will be notified as soon as the provider has reviewed the results and signed off on them.        Take 1/2 tab lisinopril 10 mg once a day.  Bring in meter to your next appointment.    Write down blood sugars before breakfast and dinner every day for one week.  Keep medication doses the same during this time.          Follow-ups after your visit        Follow-up notes from your care team     Return in about 3 weeks (around 5/18/2018) for 5/11 or 5/18 preferably, Routine Visit.      Your next 10 appointments already scheduled     Apr 30, 2018  3:00 PM CDT   Cardiac Treatment with  Cardiac Rehab 1   [facility], [place], Cardiac Rehabilitation ([facility])    [address] [place] [address] 1st Floor 19  [facility] [address]   [phone]            May 01, 2018  9:30 AM CDT   Return Visit with Brayan  DONNELL Mcclain   [facility] ([facility])    [address] [facility] [address]   [phone]            May 02, 2018  3:00 PM CDT   Cardiac Treatment with Ur Cardiac Rehab 1   [facility], [place], Cardiac Rehabilitation ([facility])    [address] 1st [address]  [facility] [address]   [phone]            May 04, 2018  3:00 PM CDT   Cardiac Treatment with Ur Cardiac Rehab 1   [facility] [place], Cardiac Rehabilitation ([facility])    [address]st [address]  [facility] [address]   [phone]            May 08, 2018  9:30 AM CDT   Return Visit with Brayan Mcclain DPM   [facility] ([facility])    [address] [facility] [address]   [phone]            May 11, 2018 10:25 AM CDT   (Arrive by 10:10 AM)   Return Visit with Racheal Swift MD   [facility] ([email or web address])    [address] [facility]  4th Floor  [facility] [address] [phone]            May 15, 2018  8:30 AM CDT   Return Visit with Brayan Mcclain DPM   [facility] ([facility])    [address] [facility] [address]   [phone]            May 22, 2018  8:00 AM CDT   Return Visit with Brayan Mcclain DPM   [facility] ([facility])    [address] [facility] [address]   [phone]            May 22, 2018  1:30 PM CDT   Return Visit with Rubio Chinchilla MD   [facility] ([facility])    [address] [place] [address] 2nd Floor  [facility] [address]   [phone]            May 29, 2018  8:30 AM CDT   Return Visit with Brayan Mcclain DPM   [facility] ([facility])    [address]  [facility] [address] [phone]              Future tests that were ordered for you today     Open Future Orders        Priority Expected Expires Ordered    Hemoglobin A1c Routine 4/27/2018 5/11/2018 4/27/2018    CBC with platelets Routine 4/27/2018 5/11/2018 4/27/2018    Creatinine Routine 4/27/2018 4/27/2019 4/27/2018            Who to contact     Please call your clinic at [phone] to:    Ask questions about your health    Make or cancel appointments    Discuss your medicines    Learn about your test results    Speak to your doctor            Additional Information About Your Visit        [employer] Information     [employer] gives you secure access to your electronic health record. If you see a primary care provider, you can also send messages to your care team and make appointments. If you have questions, please call your primary care clinic.  If you do not have a primary care provider, please call [phone] and they will assist you.      [employer] is an electronic gateway that provides easy, online access to your medical records. With [employer], you can request a clinic appointment, read your test results, renew a prescription or communicate with your care team.     To access your existing account, please contact your [facility] or call [phone] for assistance.        Care EveryWhere ID     This is your Care EveryWhere ID. This could be used by other organizations to access your [place] medical records  [id number]        Your Vitals Were     Pulse Respirations Pulse Oximetry BMI (Body Mass Index)          91 18 99% 21.82 kg/m2         Blood Pressure from Last 3 Encounters:   04/27/18 139/62   04/24/18 138/62   04/18/18 131/70    Weight from Last 3 Encounters:   04/27/18 77.1 kg (170 lb)   04/26/18 76.2 kg (168 lb)   03/29/18 77.1 kg (170 lb)                 Today's Medication Changes          These changes are accurate as of 4/27/18 11:16 AM.  If you have  any questions, ask your nurse or doctor.               These medicines have changed or have updated prescriptions.        Dose/Directions    clopidogrel 75 MG tablet   Commonly known as:  PLAVIX   This may have changed:  when to take this   Used for:  Peripheral vascular disease, unspecified        Dose:  75 mg   Take 1 tablet (75 mg) by mouth daily   Quantity:  30 tablet   Refills:  11       gentamicin 0.1 % cream   Commonly known as:  GARAMYCIN   This may have changed:    - when to take this  - reasons to take this  - additional instructions   Used for:  Ulcer of right lower leg, with fat layer exposed (H), Chronic venous hypertension with ulcer involving right side (H), Type 2 diabetes, controlled, with neuropathy (H)        Apply topically daily To right leg ulcer.   Quantity:  30 g   Refills:  5       insulin glargine 100 UNIT/ML injection   Commonly known as:  LANTUS SOLOSTAR   This may have changed:    - how much to take  - additional instructions   Used for:  Type 2 diabetes mellitus with diabetic peripheral angiopathy without gangrene, with long-term current use of insulin (H)        Dose:  23 Units   Inject 23 Units Subcutaneous daily (with dinner) May take up to 25 units daily   Quantity:  3 mL   Refills:  3       sitagliptin 100 MG tablet   Commonly known as:  JANUVIA   This may have changed:  Another medication with the same name was removed. Continue taking this medication, and follow the directions you see here.   Used for:  Type 2 diabetes, HbA1c goal < 7% (H)   Changed by:  Racheal Swift MD        Dose:  100 mg   Take 1 tablet (100 mg) by mouth daily   Quantity:  90 tablet   Refills:  1         Stop taking these medicines if you haven't already. Please contact your care team if you have questions.     COLACE PO   Stopped by:  Racheal Swift MD                    Primary Care Provider Office Phone # Fax #    Racheal Swift [phone]        [address]  [facility] [address]        Equal Access to Services     [facility] LINH : Hadii aad ku hadjeannieyomaira Sammieali, wajunitoda luqadaha, qaybta kaalmakylee jeffery, yolanda lopez. So [facility] [phone].    ATENCIÓN: Si habla español, tiene a rodrigues disposición servicios gratuitos de asistencia lingüística. Timmyame al [phone].    We comply with applicable federal civil rights laws and [place] laws. We do not discriminate on the basis of race, color, national origin, age, disability, sex, sexual orientation, or gender identity.            Thank you!     Thank you for choosing [facility]  for your care. Our goal is always to provide you with excellent care. Hearing back from our patients is one way we can continue to improve our services. Please take a few minutes to complete the written survey that you may receive in the mail after your visit with us. Thank you!             Your Updated Medication List - Protect others around you: Learn how to safely use, store and throw away your medicines at www.disposemymeds.org.          This list is accurate as of 4/27/18 11:16 AM.  Always use your most recent med list.                   Brand Name Dispense Instructions for use Diagnosis    ammonium lactate 12 % cream    LAC-HYDRIN    385 g    Apply topically 2 times daily as needed for dry skin    Venous stasis, Type 2 diabetes, controlled, with neuropathy (H)       ascorbic acid 500 MG Tabs     30 tablet    Take 1 tablet (500 mg) by mouth 2 times daily    Ulcer of right lower leg, with fat layer exposed (H)       ASPIRIN PO      Take 81 mg by mouth daily        blood glucose monitoring lancets     3 Box    Use to test blood sugars 2 as directed.    Type 2 diabetes, uncontrolled, with neuropathy (H)       clopidogrel 75 MG tablet    PLAVIX    30 tablet    Take 1 tablet (75 mg) by mouth daily    Peripheral vascular disease, unspecified       ferrous sulfate 325 (65 Fe) MG tablet    IRON    60  "tablet    Take 1 tablet (325 mg) by mouth 2 times daily    Peripheral vascular disease, unspecified       gentamicin 0.1 % cream    GARAMYCIN    30 g    Apply topically daily To right leg ulcer.    Ulcer of right lower leg, with fat layer exposed (H), Chronic venous hypertension with ulcer involving right side (H), Type 2 diabetes, controlled, with neuropathy (H)       insulin glargine 100 UNIT/ML injection    LANTUS SOLOSTAR    3 mL    Inject 23 Units Subcutaneous daily (with dinner) May take up to 25 units daily    Type 2 diabetes mellitus with diabetic peripheral angiopathy without gangrene, with long-term current use of insulin (H)       insulin pen needle 31G X 8 MM    B-D U/F    100 each    Use 1 daily o as directed    Diabetes mellitus, type II (H)       LISINOPRIL PO      Take 20 mg by mouth 2 times daily        nateglinide 120 MG tablet    STARLIX    90 tablet    TAKE 1 TABLET BY MOUTH THREE TIMES DAILY BEFORE MEALS    Type 2 diabetes, controlled, with neuropathy (H)       ONETOUCH ULTRA test strip   Generic drug:  blood glucose monitoring     200 strip    USE TO TEST TWICE DAILY OR AS DIRECTED    Type 2 diabetes mellitus (H)       OPTIFOAM 6\"X6\" Pads     1 each    1 Box once a week    Ulcer of right leg, with fat layer exposed (H)       order for DME     2 each    Please measure and distribute 1 pair of 20mm Hg - 30mm Hg knee high ULCER CARE open or closed toe compression stockings.  Patient has a size 13 foot and please take this into consideration.  Jobst or equivalent    Varicose veins of lower extremities with other complications, Venous stasis ulcer of right lower extremity (H)       order for DME     3 each    Please measure and distribute 1 pair of 20mmHg - 30mmHg knee high open or closed toe compression stockings. Jobst ultrasheer or equivalent.    Varicose veins of both lower extremities with complications       order for DME     2 each    Please measure and distribute 1 pair of 30mmHg - 40mmHg " knee high open toe ulcercare compression stockings. Jobst ultrasheer or equivalent.    Varicose veins of bilateral lower extremities with other complications       sildenafil 50 MG tablet    VIAGRA    10 tablet    Take 1 tablet (50 mg) by mouth daily as needed for erectile dysfunction    Vasculogenic erectile dysfunction, unspecified vasculogenic erectile dysfunction type       silver sulfADIAZINE 1 % cream    SILVADENE    85 g    Apply topically daily To affected areas on right foot and leg.    Ulcer of right lower leg, with fat layer exposed (H), Chronic venous hypertension with ulcer involving right side (H), Type 2 diabetes, controlled, with neuropathy (H)       simvastatin 10 MG tablet    ZOCOR    90 tablet    Take 1 tablet (10 mg) by mouth At Bedtime    Type 2 diabetes, controlled, with neuropathy (H)       sitagliptin 100 MG tablet    JANUVIA    90 tablet    Take 1 tablet (100 mg) by mouth daily    Type 2 diabetes, HbA1c goal < 7% (H)       triamcinolone 0.1 % cream    KENALOG    30 g    Apply sparingly to left heel daily.    Dermatitis of left foot       VITAMIN C PO      Take 1,000 mg by mouth        VITAMIN D (CHOLECALCIFEROL) PO      Take 1,000 Units by mouth 2 times daily
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
## 2018-04-29 ENCOUNTER — MYC MEDICAL ADVICE (OUTPATIENT)
Dept: INTERNAL MEDICINE | Facility: CLINIC | Age: 73
End: 2018-04-29

## 2018-04-30 ENCOUNTER — HOSPITAL ENCOUNTER (OUTPATIENT)
Dept: CARDIAC REHAB | Facility: CLINIC | Age: 73
End: 2018-04-30
Attending: INTERNAL MEDICINE
Payer: MEDICARE

## 2018-04-30 PROCEDURE — 93798 PHYS/QHP OP CAR RHAB W/ECG: CPT | Performed by: OCCUPATIONAL THERAPIST

## 2018-04-30 PROCEDURE — 40000116 ZZH STATISTIC OP CR VISIT: Performed by: OCCUPATIONAL THERAPIST

## 2018-04-30 RX ORDER — NATEGLINIDE 120 MG/1
120 TABLET ORAL
Qty: 90 TABLET | Refills: 3 | Status: SHIPPED | OUTPATIENT
Start: 2018-04-30 | End: 2018-09-30

## 2018-04-30 RX ORDER — CLOPIDOGREL BISULFATE 75 MG/1
75 TABLET ORAL DAILY
Qty: 30 TABLET | Refills: 3 | Status: SHIPPED | OUTPATIENT
Start: 2018-04-30 | End: 2018-08-20

## 2018-05-01 ENCOUNTER — OFFICE VISIT (OUTPATIENT)
Dept: PODIATRY | Facility: CLINIC | Age: 73
End: 2018-05-01
Payer: MEDICARE

## 2018-05-01 VITALS — HEART RATE: 96 BPM | OXYGEN SATURATION: 98 % | DIASTOLIC BLOOD PRESSURE: 60 MMHG | SYSTOLIC BLOOD PRESSURE: 134 MMHG

## 2018-05-01 DIAGNOSIS — E11.51 DIABETES MELLITUS WITH PERIPHERAL VASCULAR DISEASE (H): ICD-10-CM

## 2018-05-01 DIAGNOSIS — L97.922 ULCER OF LEG, CHRONIC, LEFT, WITH FAT LAYER EXPOSED (H): ICD-10-CM

## 2018-05-01 DIAGNOSIS — L97.522 SKIN ULCER OF LEFT FOOT WITH FAT LAYER EXPOSED (H): Primary | ICD-10-CM

## 2018-05-01 DIAGNOSIS — E11.49 TYPE II OR UNSPECIFIED TYPE DIABETES MELLITUS WITH NEUROLOGICAL MANIFESTATIONS, NOT STATED AS UNCONTROLLED(250.60) (H): ICD-10-CM

## 2018-05-01 PROCEDURE — 99213 OFFICE O/P EST LOW 20 MIN: CPT | Performed by: PODIATRIST
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
## 2018-05-01 NOTE — PATIENT INSTRUCTIONS
Thanks for coming today.  Ortho/Sports Medicine Clinic  [address] [place] [address]    To schedule future appointments in Ortho Clinic, you may call [phone].    To schedule ordered imaging by your Provider: Call [facility] at [phone]    [employer] available online at:   [email or web address]    Please call if any further questions or concerns [phone] and ask for the Orthopedic Department. Clinic hours 8 am to 5 pm.    Return to clinic if symptoms worsen.
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
## 2018-05-01 NOTE — NURSING NOTE
"Amos Walker's goals for this visit include: Wound check   He requests these members of his care team be copied on today's visit information: yes    PCP: Racheal Swift    Referring Provider:  Referred Self, MD  No address on file    Chief Complaint   Patient presents with     RECHECK     wound check        Initial /60  Pulse 96  SpO2 98% Estimated body mass index is 21.82 kg/(m^2) as calculated from the following:    Height as of 4/26/18: 1.88 m (6' 2.02\").    Weight as of 4/27/18: 77.1 kg (170 lb).  Medication Reconciliation: complete    "

## 2018-05-01 NOTE — MR AVS SNAPSHOT
After Visit Summary   5/1/2018    Amos Walker    MRN: [id number]           Patient Information     Date Of Birth          1945        Visit Information        Provider Department      5/1/2018 9:30 AM Brayan Mcclain DPM [facility]        Today's Diagnoses     Skin ulcer of left foot with fat layer exposed (H)    -  1    Type II or unspecified type diabetes mellitus with neurological manifestations, not stated as uncontrolled(250.60) (H)        Diabetes mellitus with peripheral vascular disease (H)        Ulcer of leg, chronic, left, with fat layer exposed (H)          Care Instructions    Thanks for coming today.  Ortho/Sports Medicine Clinic  [address] [place] [address]    To schedule future appointments in Ortho Clinic, you may call [phone].    To schedule ordered imaging by your Provider: Call [facility] at [phone]    [employer] available online at:   [email or web address]    Please call if any further questions or concerns [phone] and ask for the Orthopedic Department. Clinic hours 8 am to 5 pm.    Return to clinic if symptoms worsen.            Follow-ups after your visit        Your next 10 appointments already scheduled     May 02, 2018  3:00 PM CDT   Cardiac Treatment with Ur Cardiac Rehab 1   [facility], Cardiac Rehabilitation ([facility])    [address] [place] [address]   [phone]            May 04, 2018  3:00 PM CDT   Cardiac Treatment with Ur Cardiac Rehab 1   [facility], Cardiac Rehabilitation ([facility])    [address] [place] [address] [address]st [address] [address]   [phone]            May 07, 2018  1:00 PM CDT   Cardiac Treatment with Ur Cardiac Rehab 1   [facility], Card[facility] Rehabilitation ([facility])    [facility] [address]st [address] [address]   [phone]            May 08, 2018  9:30 AM CDT   Return Visit with Brayan Mcclain DPM   [facility] ([facility])    [address] [place] [address]   [phone]            May 09, 2018  1:00 PM CDT   Cardiac Treatment with Ur Cardiac Rehab 1   [facility], Cardiac Rehabilitation ([facility])    [address] [place] [address] [address] [address]   [phone]            May 11, 2018 10:25 AM CDT   (Arrive by 10:10 AM)   Return Visit with Racheal Switf MD   [facility] ([facility])    [address] [address]   [phone]            May 11, 2018  1:00 PM CDT   Cardiac Treatment with Ur Cardiac Rehab 1   [facility], Cardiac Rehabilitation ([facility])    [address] [place] [address] [address] [address]   [phone]            May 15, 2018  8:30 AM CDT   Return Visit with Brayan Mcclain DPM   [facility] ([facility])    [address] [place] [address]   [phone]            May 22, 2018  8:00 AM CDT   Return Visit with Brayan Mcclain DPM   [facility] ([facility])    [address] [place] [address]   [phone]            May 22, 2018  1:30 PM CDT   Return Visit with Rubio Chinchilla MD   [facility] ([facility])    [address] [place] [address] 2nd [facility] [address] [phone]              Who to contact     If you have questions or need follow up information about today's clinic visit or your schedule please contact [facility] directly at  [phone].  Normal or non-critical lab and imaging results will be communicated to you by [employer], letter or phone within 4 business days after the clinic has received the results. If you do not hear from us within 7 days, please contact the clinic through [employer] or phone. If you have a critical or abnormal lab result, we will notify you by phone as soon as possible.  Submit refill requests through [employer] or call your pharmacy and they will forward the refill request to us. Please allow 3 business days for your refill to be completed.          Additional Information About Your Visit        [employer] Information     [employer] gives you secure access to your electronic health record. If you see a primary care provider, you can also send messages to your care team and make appointments. If you have questions, please call your primary care clinic.  If you do not have a primary care provider, please call [phone] and they will assist you.      [employer] is an electronic gateway that provides easy, online access to your medical records. With [employer], you can request a clinic appointment, read your test results, renew a prescription or communicate with your care team.     To access your existing account, please contact your [facility] or call [phone] for assistance.        Care EveryWhere ID     This is your Care EveryWhere ID. This could be used by other organizations to access your [facility] medical records  [id number]        Your Vitals Were     Pulse Pulse Oximetry                96 98%           Blood Pressure from Last 3 Encounters:   05/01/18 134/60   04/27/18 139/62   04/24/18 138/62    Weight from Last 3 Encounters:   04/27/18 77.1 kg (170 lb)   04/26/18 76.2 kg (168 lb)   03/29/18 77.1 kg (170 lb)              Today, you had the following     No orders found for display         Today's Medication Changes          These changes are accurate as of 5/1/18  2:27 PM.  If you  have any questions, ask your nurse or doctor.               These medicines have changed or have updated prescriptions.        Dose/Directions    * clopidogrel 75 MG tablet   Commonly known as:  PLAVIX   This may have changed:  when to take this   Used for:  Peripheral vascular disease, unspecified        Dose:  75 mg   Take 1 tablet (75 mg) by mouth daily   Quantity:  30 tablet   Refills:  11       * clopidogrel 75 MG tablet   Commonly known as:  PLAVIX   This may have changed:  Another medication with the same name was changed. Make sure you understand how and when to take each.   Used for:  Peripheral vascular disease (H)        Dose:  75 mg   Take 1 tablet (75 mg) by mouth daily   Quantity:  30 tablet   Refills:  3       gentamicin 0.1 % cream   Commonly known as:  GARAMYCIN   This may have changed:    - when to take this  - reasons to take this  - additional instructions   Used for:  Ulcer of right lower leg, with fat layer exposed (H), Chronic venous hypertension with ulcer involving right side (H), Type 2 diabetes, controlled, with neuropathy (H)        Apply topically daily To right leg ulcer.   Quantity:  30 g   Refills:  5       insulin glargine 100 UNIT/ML injection   Commonly known as:  LANTUS SOLOSTAR   This may have changed:    - how much to take  - additional instructions   Used for:  Type 2 diabetes mellitus with diabetic peripheral angiopathy without gangrene, with long-term current use of insulin (H)        Dose:  23 Units   Inject 23 Units Subcutaneous daily (with dinner) May take up to 25 units daily   Quantity:  3 mL   Refills:  3       * Notice:  This list has 2 medication(s) that are the same as other medications prescribed for you. Read the directions carefully, and ask your doctor or other care provider to review them with you.             Primary Care Provider Office Phone # Fax #    Racheal Swift [phone] [address] [address]         Equal Access to Services     [facility] Hospital[facility] : Hadii aad ku hadjeannieyomaira Sammieali, waaxda luqadaha, qaybta kakayliyolanda mares. So [facility] [phone].    ATENCIÓN: Si habla español, tiene a rodrigues disposición servicios gratuitos de asistencia lingüística. Timmyame al [phone].    We comply with applicable federal civil rights laws and [place] laws. We do not discriminate on the basis of race, color, national origin, age, disability, sex, sexual orientation, or gender identity.            Thank you!     Thank you for choosing [facility]  for your care. Our goal is always to provide you with excellent care. Hearing back from our patients is one way we can continue to improve our services. Please take a few minutes to complete the written survey that you may receive in the mail after your visit with us. Thank you!             Your Updated Medication List - Protect others around you: Learn how to safely use, store and throw away your medicines at www.disposemymeds.org.          This list is accurate as of 5/1/18  2:27 PM.  Always use your most recent med list.                   Brand Name Dispense Instructions for use Diagnosis    ammonium lactate 12 % cream    LAC-HYDRIN    385 g    Apply topically 2 times daily as needed for dry skin    Venous stasis, Type 2 diabetes, controlled, with neuropathy (H)       ascorbic acid 500 MG Tabs     30 tablet    Take 1 tablet (500 mg) by mouth 2 times daily    Ulcer of right lower leg, with fat layer exposed (H)       ASPIRIN PO      Take 81 mg by mouth daily        blood glucose monitoring lancets     3 Box    Use to test blood sugars 2 as directed.    Type 2 diabetes, uncontrolled, with neuropathy (H)       * clopidogrel 75 MG tablet    PLAVIX    30 tablet    Take 1 tablet (75 mg) by mouth daily    Peripheral vascular disease, unspecified       * clopidogrel 75 MG tablet    PLAVIX    30 tablet    Take 1 tablet (75 mg) by mouth  "daily    Peripheral vascular disease (H)       ferrous sulfate 325 (65 Fe) MG tablet    IRON    60 tablet    Take 1 tablet (325 mg) by mouth 2 times daily    Peripheral vascular disease, unspecified       gentamicin 0.1 % cream    GARAMYCIN    30 g    Apply topically daily To right leg ulcer.    Ulcer of right lower leg, with fat layer exposed (H), Chronic venous hypertension with ulcer involving right side (H), Type 2 diabetes, controlled, with neuropathy (H)       insulin glargine 100 UNIT/ML injection    LANTUS SOLOSTAR    3 mL    Inject 23 Units Subcutaneous daily (with dinner) May take up to 25 units daily    Type 2 diabetes mellitus with diabetic peripheral angiopathy without gangrene, with long-term current use of insulin (H)       insulin pen needle 31G X 8 MM    B-D U/F    100 each    Use 1 daily o as directed    Diabetes mellitus, type II (H)       LISINOPRIL PO      Take 20 mg by mouth 2 times daily        * nateglinide 120 MG tablet    STARLIX    90 tablet    TAKE 1 TABLET BY MOUTH THREE TIMES DAILY BEFORE MEALS    Type 2 diabetes, controlled, with neuropathy (H)       * nateglinide 120 MG tablet    STARLIX    90 tablet    Take 1 tablet (120 mg) by mouth 3 times daily (before meals)    Diabetes mellitus (H)       ONETOUCH ULTRA test strip   Generic drug:  blood glucose monitoring     200 strip    USE TO TEST TWICE DAILY OR AS DIRECTED    Type 2 diabetes mellitus (H)       OPTIFOAM 6\"X6\" Pads     1 each    1 Box once a week    Ulcer of right leg, with fat layer exposed (H)       order for DME     2 each    Please measure and distribute 1 pair of 20mm Hg - 30mm Hg knee high ULCER CARE open or closed toe compression stockings.  Patient has a size 13 foot and please take this into consideration.  Jobst or equivalent    Varicose veins of lower extremities with other complications, Venous stasis ulcer of right lower extremity (H)       order for DME     3 each    Please measure and distribute 1 pair of 20mmHg " - 30mmHg knee high open or closed toe compression stockings. Jobst ultrasheer or equivalent.    Varicose veins of both lower extremities with complications       order for DME     2 each    Please measure and distribute 1 pair of 30mmHg - 40mmHg knee high open toe ulcercare compression stockings. Jobst ultrasheer or equivalent.    Varicose veins of bilateral lower extremities with other complications       sildenafil 50 MG tablet    VIAGRA    10 tablet    Take 1 tablet (50 mg) by mouth daily as needed for erectile dysfunction    Vasculogenic erectile dysfunction, unspecified vasculogenic erectile dysfunction type       silver sulfADIAZINE 1 % cream    SILVADENE    85 g    Apply topically daily To affected areas on right foot and leg.    Ulcer of right lower leg, with fat layer exposed (H), Chronic venous hypertension with ulcer involving right side (H), Type 2 diabetes, controlled, with neuropathy (H)       simvastatin 10 MG tablet    ZOCOR    90 tablet    Take 1 tablet (10 mg) by mouth At Bedtime    Type 2 diabetes, controlled, with neuropathy (H)       sitagliptin 100 MG tablet    JANUVIA    90 tablet    Take 1 tablet (100 mg) by mouth daily    Type 2 diabetes, HbA1c goal < 7% (H)       triamcinolone 0.1 % cream    KENALOG    30 g    Apply sparingly to left heel daily.    Dermatitis of left foot       VITAMIN C PO      Take 1,000 mg by mouth        VITAMIN D (CHOLECALCIFEROL) PO      Take 1,000 Units by mouth 2 times daily        * Notice:  This list has 4 medication(s) that are the same as other medications prescribed for you. Read the directions carefully, and ask your doctor or other care provider to review them with you.

## 2018-05-02 ENCOUNTER — HOSPITAL ENCOUNTER (OUTPATIENT)
Dept: CARDIAC REHAB | Facility: CLINIC | Age: 73
End: 2018-05-02
Attending: INTERNAL MEDICINE
Payer: MEDICARE

## 2018-05-02 PROCEDURE — 93798 PHYS/QHP OP CAR RHAB W/ECG: CPT

## 2018-05-02 PROCEDURE — 40000116 ZZH STATISTIC OP CR VISIT

## 2018-05-04 ENCOUNTER — HOSPITAL ENCOUNTER (OUTPATIENT)
Dept: CARDIAC REHAB | Facility: CLINIC | Age: 73
End: 2018-05-04
Attending: INTERNAL MEDICINE
Payer: MEDICARE

## 2018-05-04 PROCEDURE — 40000116 ZZH STATISTIC OP CR VISIT

## 2018-05-04 PROCEDURE — 93798 PHYS/QHP OP CAR RHAB W/ECG: CPT

## 2018-05-07 ENCOUNTER — HOSPITAL ENCOUNTER (OUTPATIENT)
Dept: CARDIAC REHAB | Facility: CLINIC | Age: 73
End: 2018-05-07
Attending: INTERNAL MEDICINE
Payer: MEDICARE

## 2018-05-07 PROCEDURE — 93798 PHYS/QHP OP CAR RHAB W/ECG: CPT | Performed by: OCCUPATIONAL THERAPIST

## 2018-05-07 PROCEDURE — 40000116 ZZH STATISTIC OP CR VISIT: Performed by: OCCUPATIONAL THERAPIST

## 2018-05-08 ENCOUNTER — OFFICE VISIT (OUTPATIENT)
Dept: PODIATRY | Facility: CLINIC | Age: 73
End: 2018-05-08
Payer: MEDICARE

## 2018-05-08 VITALS — HEART RATE: 81 BPM | DIASTOLIC BLOOD PRESSURE: 60 MMHG | OXYGEN SATURATION: 98 % | SYSTOLIC BLOOD PRESSURE: 130 MMHG

## 2018-05-08 DIAGNOSIS — E11.49 TYPE II OR UNSPECIFIED TYPE DIABETES MELLITUS WITH NEUROLOGICAL MANIFESTATIONS, NOT STATED AS UNCONTROLLED(250.60) (H): ICD-10-CM

## 2018-05-08 DIAGNOSIS — L97.912 ULCER OF RIGHT LOWER EXTREMITY WITH FAT LAYER EXPOSED (H): ICD-10-CM

## 2018-05-08 DIAGNOSIS — L97.512 SKIN ULCER OF RIGHT FOOT WITH FAT LAYER EXPOSED (H): Primary | ICD-10-CM

## 2018-05-08 DIAGNOSIS — E11.51 DIABETES MELLITUS WITH PERIPHERAL VASCULAR DISEASE (H): ICD-10-CM

## 2018-05-08 PROCEDURE — 99213 OFFICE O/P EST LOW 20 MIN: CPT | Performed by: PODIATRIST

## 2018-05-08 ASSESSMENT — PAIN SCALES - GENERAL: PAINLEVEL: NO PAIN (0)

## 2018-05-08 NOTE — NURSING NOTE
Amos Walker's goals for this visit include: Left foot and right leg ulcer  He requests these members of his care team be copied on today's visit information: yes    PCP: Racheal Swift    Referring Provider:  Referred Self, MD  No address on file
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
## 2018-05-08 NOTE — MR AVS SNAPSHOT
After Visit Summary   2018    Amos Walker    MRN: [id number]           Patient Information     Date Of Birth          1945        Visit Information        Provider Department      2018 9:30 AM Brayan Mcclain DPM [facility]        Today's Diagnoses     Skin ulcer of right foot with fat layer exposed (H)    -  1    Ulcer of right lower extremity with fat layer exposed (H)        Diabetes mellitus with peripheral vascular disease (H)        Type II or unspecified type diabetes mellitus with neurological manifestations, not stated as uncontrolled(250.60) (H)          Care Instructions    Thanks for coming today.  Ortho/Sports Medicine Clinic  [address] [place] [address]    To schedule future appointments in Ortho Clinic, you may call [phone].    To schedule ordered imaging by your provider:   Call Central Imaging [phone]    To schedule an injection ordered by your provider:  Call Central Imaging Injection scheduling line: [phone]  [employer] available online at:  [email or web address]    Please call if any further questions or concerns ([phone]).  Clinic hours 8 am to 5 pm.    Return to clinic (call) if symptoms worsen or fail to improve.            Follow-ups after your visit        Your next 10 appointments already scheduled     May 09, 2018  1:00 PM CDT   Cardiac Treatment with Ur Cardiac Rehab 1   [facility], Cardiac Rehabilitation ([facility])    [address] [place] [address]   [phone]            May 10, 2018  1:00 PM CDT   Cardiac Treatment with Ur Cardiac Rehab 2   [facility], Cardiac Rehabilitation ([facility])    [address] [place] [address]   [phone]            May 11, 2018 10:25 AM CDT   (Arrive by 10:10  AM)   Return Visit with Racheal Swift MD   [facility] ([facility])    [address]  4th Floor  [facility] [address]   [phone]            May 14, 2018  1:00 PM CDT   Cardiac Treatment with Ur Cardiac Rehab 1   [facility], Cardiac Rehabilitation ([facility])    [address]st [address]  [facility] [address]   [phone]            May 15, 2018  8:30 AM CDT   [facility] Brayan [facility]clain D[facility]   [facility]    [address] [place] [address] [phone]            May 16, 2018  1:00 PM CDT   Cardiac Treatment with Ur Cardiac Rehab 1   [facility], Cardiac Rehabilitation ([facility])    [facility] [address] 1st [address]  [facility] [address]   [phone]            May 18, 2018  1:00 PM CDT   Cardiac Treatment with Ur Cardiac Rehab 1   [facility], Cardiac Rehabilitation ([facility])    [facility] [address] 1st [address]  [facility] [address] [phone]            May 22, 2018  8:00 AM CDT   Return Visit with Brayan Mcclain DPM   [facility]    [address] [place] [address] [phone]            May 22, 2018  1:30 PM CDT   Return Visit with Rubio Chinchilla MD   [facility] ([facility])    [address] [place] [address] 2nd Floor  [facility] [address]   [phone]            May 29, 2018  8:30 AM CDT   [facility] Visit with Brayan Mcclain DPM   [facility]    [address] [facility] [address] [phone]              Who to contact     If you have questions or need follow up  information about today's clinic visit or your schedule please contact [facility] directly at [phone].  Normal or non-critical lab and imaging results will be communicated to you by [employer], letter or phone within 4 business days after the clinic has received the results. If you do not hear from us within 7 days, please contact the clinic through [employer] or phone. If you have a critical or abnormal lab result, we will notify you by phone as soon as possible.  Submit refill requests through [email or web address] or call your pharmacy and they will forward the refill request to us. Please allow 3 business days for your refill to be completed.          Additional Information About Your Visit        [employer] Information     [email or web address] gives you secure access to your electronic health record. If you see a primary care provider, you can also send messages to your care team and make appointments. If you have questions, please call your primary care clinic.  If you do not have a primary care provider, please call [phone] and they will assist you.      [email or web address] is an electronic gateway that provides easy, online access to your medical records. With [email or web address], you can request a clinic appointment, read your test results, renew a prescription or communicate with your care team.     To access your existing account, please contact your [facility] or call [phone] for assistance.        Care EveryWhere ID     This is your Care EveryWhere ID. This could be used by other organizations to access your [facility] medical records  [id number]        Your Vitals Were     Pulse Pulse Oximetry                81 98%           Blood Pressure from Last 3 Encounters:   05/08/18 130/60   05/01/18 134/60   04/27/18 139/62    Weight from Last 3 Encounters:   04/27/18 77.1 kg (170 lb)   04/26/18 76.2 kg (168 lb)   03/29/18 77.1 kg (170 lb)              Today, you had the following     No orders found for  display         Today's Medication Changes          These changes are accurate as of 5/8/18  1:05 PM.  If you have any questions, ask your nurse or doctor.               These medicines have changed or have updated prescriptions.        Dose/Directions    * clopidogrel 75 MG tablet   Commonly known as:  PLAVIX   This may have changed:  when to take this   Used for:  Peripheral vascular disease, unspecified        Dose:  75 mg   Take 1 tablet (75 mg) by mouth daily   Quantity:  30 tablet   Refills:  11       * clopidogrel 75 MG tablet   Commonly known as:  PLAVIX   This may have changed:  Another medication with the same name was changed. Make sure you understand how and when to take each.   Used for:  Peripheral vascular disease (H)        Dose:  75 mg   Take 1 tablet (75 mg) by mouth daily   Quantity:  30 tablet   Refills:  3       gentamicin 0.1 % cream   Commonly known as:  GARAMYCIN   This may have changed:    - when to take this  - reasons to take this  - additional instructions   Used for:  Ulcer of right lower leg, with fat layer exposed (H), Chronic venous hypertension with ulcer involving right side (H), Type 2 diabetes, controlled, with neuropathy (H)        Apply topically daily To right leg ulcer.   Quantity:  30 g   Refills:  5       insulin glargine 100 UNIT/ML injection   Commonly known as:  LANTUS SOLOSTAR   This may have changed:    - how much to take  - additional instructions   Used for:  Type 2 diabetes mellitus with diabetic peripheral angiopathy without gangrene, with long-term current use of insulin (H)        Dose:  23 Units   Inject 23 Units Subcutaneous daily (with dinner) May take up to 25 units daily   Quantity:  3 mL   Refills:  3       * Notice:  This list has 2 medication(s) that are the same as other medications prescribed for you. Read the directions carefully, and ask your doctor or other care provider to review them with you.             Primary Care Provider Office Phone # Fax #     Racheal Swift [phone]       [address] [address]        Equal Access to Services     SAMSON MELTON : Hadii aad ku hadjeannieyomaira Renukager, wajunitoda vargaskarlyha, qaarmindata kakaylida jose d, yolanda pérez destineydamion blair laMickhollie lopez. So [facility] [phone].    ATENCIÓN: Si habla español, tiene a rodrigues disposición servicios gratuitos de asistencia lingüística. Llame al [phone].    We comply with applicable federal civil rights laws and [place] laws. We do not discriminate on the basis of race, color, national origin, age, disability, sex, sexual orientation, or gender identity.            Thank you!     Thank you for choosing [facility]  for your care. Our goal is always to provide you with excellent care. Hearing back from our patients is one way we can continue to improve our services. Please take a few minutes to complete the written survey that you may receive in the mail after your visit with us. Thank you!             Your Updated Medication List - Protect others around you: Learn how to safely use, store and throw away your medicines at www.disposemymeds.org.          This list is accurate as of 5/8/18  1:05 PM.  Always use your most recent med list.                   Brand Name Dispense Instructions for use Diagnosis    ammonium lactate 12 % cream    LAC-HYDRIN    385 g    Apply topically 2 times daily as needed for dry skin    Venous stasis, Type 2 diabetes, controlled, with neuropathy (H)       ascorbic acid 500 MG Tabs     30 tablet    Take 1 tablet (500 mg) by mouth 2 times daily    Ulcer of right lower leg, with fat layer exposed (H)       ASPIRIN PO      Take 81 mg by mouth daily        blood glucose monitoring lancets     3 Box    Use to test blood sugars 2 as directed.    Type 2 diabetes, uncontrolled, with neuropathy (H)       * clopidogrel 75 MG tablet    PLAVIX    30 tablet    Take 1 tablet (75 mg) by mouth daily    Peripheral vascular  "disease, unspecified       * clopidogrel 75 MG tablet    PLAVIX    30 tablet    Take 1 tablet (75 mg) by mouth daily    Peripheral vascular disease (H)       ferrous sulfate 325 (65 Fe) MG tablet    IRON    60 tablet    Take 1 tablet (325 mg) by mouth 2 times daily    Peripheral vascular disease, unspecified       gentamicin 0.1 % cream    GARAMYCIN    30 g    Apply topically daily To right leg ulcer.    Ulcer of right lower leg, with fat layer exposed (H), Chronic venous hypertension with ulcer involving right side (H), Type 2 diabetes, controlled, with neuropathy (H)       insulin glargine 100 UNIT/ML injection    LANTUS SOLOSTAR    3 mL    Inject 23 Units Subcutaneous daily (with dinner) May take up to 25 units daily    Type 2 diabetes mellitus with diabetic peripheral angiopathy without gangrene, with long-term current use of insulin (H)       insulin pen needle 31G X 8 MM    B-D U/F    100 each    Use 1 daily o as directed    Diabetes mellitus, type II (H)       LISINOPRIL PO      Take 20 mg by mouth 2 times daily        * nateglinide 120 MG tablet    STARLIX    90 tablet    TAKE 1 TABLET BY MOUTH THREE TIMES DAILY BEFORE MEALS    Type 2 diabetes, controlled, with neuropathy (H)       * nateglinide 120 MG tablet    STARLIX    90 tablet    Take 1 tablet (120 mg) by mouth 3 times daily (before meals)    Diabetes mellitus (H)       ONETOUCH ULTRA test strip   Generic drug:  blood glucose monitoring     200 strip    USE TO TEST TWICE DAILY OR AS DIRECTED    Type 2 diabetes mellitus (H)       OPTIFOAM 6\"X6\" Pads     1 each    1 Box once a week    Ulcer of right leg, with fat layer exposed (H)       order for DME     2 each    Please measure and distribute 1 pair of 20mm Hg - 30mm Hg knee high ULCER CARE open or closed toe compression stockings.  Patient has a size 13 foot and please take this into consideration.  Jobst or equivalent    Varicose veins of lower extremities with other complications, Venous stasis ulcer " of right lower extremity (H)       order for DME     3 each    Please measure and distribute 1 pair of 20mmHg - 30mmHg knee high open or closed toe compression stockings. Jobst ultrasheer or equivalent.    Varicose veins of both lower extremities with complications       order for DME     2 each    Please measure and distribute 1 pair of 30mmHg - 40mmHg knee high open toe ulcercare compression stockings. Jobst ultrasheer or equivalent.    Varicose veins of bilateral lower extremities with other complications       sildenafil 50 MG tablet    VIAGRA    10 tablet    Take 1 tablet (50 mg) by mouth daily as needed for erectile dysfunction    Vasculogenic erectile dysfunction, unspecified vasculogenic erectile dysfunction type       silver sulfADIAZINE 1 % cream    SILVADENE    85 g    Apply topically daily To affected areas on right foot and leg.    Ulcer of right lower leg, with fat layer exposed (H), Chronic venous hypertension with ulcer involving right side (H), Type 2 diabetes, controlled, with neuropathy (H)       simvastatin 10 MG tablet    ZOCOR    90 tablet    Take 1 tablet (10 mg) by mouth At Bedtime    Type 2 diabetes, controlled, with neuropathy (H)       sitagliptin 100 MG tablet    JANUVIA    90 tablet    Take 1 tablet (100 mg) by mouth daily    Type 2 diabetes, HbA1c goal < 7% (H)       triamcinolone 0.1 % cream    KENALOG    30 g    Apply sparingly to left heel daily.    Dermatitis of left foot       VITAMIN C PO      Take 1,000 mg by mouth        VITAMIN D (CHOLECALCIFEROL) PO      Take 1,000 Units by mouth 2 times daily        * Notice:  This list has 4 medication(s) that are the same as other medications prescribed for you. Read the directions carefully, and ask your doctor or other care provider to review them with you.

## 2018-05-08 NOTE — PATIENT INSTRUCTIONS
Thanks for coming today.  Ortho/Sports Medicine Clinic  [address] [place] [address]    To schedule future appointments in Ortho Clinic, you may call [phone].    To schedule ordered imaging by your provider:   Call Central Imaging [phone]    To schedule an injection ordered by your provider:  Call Central Imaging Injection scheduling line: [phone]  [employer] available online at:  [email or web address]    Please call if any further questions or concerns ([phone]).  Clinic hours 8 am to 5 pm.    Return to clinic (call) if symptoms worsen or fail to improve.

## 2018-05-09 ENCOUNTER — HOSPITAL ENCOUNTER (OUTPATIENT)
Dept: CARDIAC REHAB | Facility: CLINIC | Age: 73
End: 2018-05-09
Attending: INTERNAL MEDICINE
Payer: MEDICARE

## 2018-05-09 PROCEDURE — 93798 PHYS/QHP OP CAR RHAB W/ECG: CPT | Performed by: REHABILITATION PRACTITIONER

## 2018-05-09 PROCEDURE — 40000116 ZZH STATISTIC OP CR VISIT: Performed by: REHABILITATION PRACTITIONER

## 2018-05-10 ENCOUNTER — HOSPITAL ENCOUNTER (OUTPATIENT)
Dept: CARDIAC REHAB | Facility: CLINIC | Age: 73
End: 2018-05-10
Attending: INTERNAL MEDICINE
Payer: MEDICARE

## 2018-05-10 PROCEDURE — 40000116 ZZH STATISTIC OP CR VISIT

## 2018-05-10 PROCEDURE — 93798 PHYS/QHP OP CAR RHAB W/ECG: CPT

## 2018-05-11 ENCOUNTER — OFFICE VISIT (OUTPATIENT)
Dept: INTERNAL MEDICINE | Facility: CLINIC | Age: 73
End: 2018-05-11
Payer: MEDICARE

## 2018-05-11 ENCOUNTER — RADIANT APPOINTMENT (OUTPATIENT)
Dept: CT IMAGING | Facility: CLINIC | Age: 73
End: 2018-05-11
Attending: INTERNAL MEDICINE
Payer: MEDICARE

## 2018-05-11 VITALS
DIASTOLIC BLOOD PRESSURE: 76 MMHG | WEIGHT: 168.96 LBS | SYSTOLIC BLOOD PRESSURE: 119 MMHG | BODY MASS INDEX: 21.68 KG/M2 | HEART RATE: 93 BPM

## 2018-05-11 DIAGNOSIS — Z12.2 ENCOUNTER FOR SCREENING FOR LUNG CANCER: ICD-10-CM

## 2018-05-11 DIAGNOSIS — E11.51 TYPE 2 DIABETES MELLITUS WITH DIABETIC PERIPHERAL ANGIOPATHY WITHOUT GANGRENE, WITH LONG-TERM CURRENT USE OF INSULIN (H): ICD-10-CM

## 2018-05-11 DIAGNOSIS — E11.8 TYPE 2 DIABETES MELLITUS WITH COMPLICATION, WITHOUT LONG-TERM CURRENT USE OF INSULIN (H): Primary | ICD-10-CM

## 2018-05-11 DIAGNOSIS — Z87.891 HISTORY OF TOBACCO USE: ICD-10-CM

## 2018-05-11 DIAGNOSIS — Z79.4 TYPE 2 DIABETES MELLITUS WITH DIABETIC PERIPHERAL ANGIOPATHY WITHOUT GANGRENE, WITH LONG-TERM CURRENT USE OF INSULIN (H): ICD-10-CM

## 2018-05-11 ASSESSMENT — PAIN SCALES - GENERAL: PAINLEVEL: NO PAIN (0)
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
## 2018-05-11 NOTE — Clinical Note
I updated the note with results of CT - benign appearance with recommendation of annual screening. Incidentally noted to have mild COPD. I wonder if it would be worth considering PFTs and assess for exertional dyspnea at next visit, to determine whether PRN albuterol could be useful.

## 2018-05-11 NOTE — PATIENT INSTRUCTIONS
[facility]: [phone]     [facility] Medication Refill Request Information:  * Please contact your pharmacy regarding ANY request for medication refills.  ** [facility] Prescription Fax = [phone]  * Please allow 3 business days for routine medication refills.  * Please allow 5 business days for controlled substance medication refills.     [facility] Test Result notification information:  *You will be notified with in 7-10 days of your appointment day regarding the results of your test.  If you are on MyChart you will be notified as soon as the provider has reviewed the results and signed off on them.        Please start taking 18 units of lantus.  Continue starlix.    Continue 10 mg of lisinopril (1/2 of 20 mg tab) but change to night time.    Come back in 2 months with labs prior to visit.    Lung Cancer Screening   Frequently Asked Questions  If you are at high-risk for lung cancer, getting screened with low-dose computed tomography (LDCT) every year can help save your life. This handout offers answers to some of the most common questions about lung cancer screening. If you have other questions, please call [phone] ([phone]).     What is it?  Lung cancer screening uses special X-ray technology to create an image of your lung tissue. The exam is quick and easy and takes less than 10 seconds. We don t give you any medicine or use any needles. You can eat before and after the exam. You don t need to change your clothes as long as the clothing on your chest doesn t contain metal. But, you do need to be able to hold your breath for at least 6 seconds during the exam.    What is the goal of lung cancer screening?  The goal of lung cancer screening is to save lives. Many times, lung cancer is not found until a person starts having physical symptoms. Lung cancer screening can help detect lung cancer in the earliest stages when it may be easier to treat.    Who should be  screened for lung cancer?  We suggest lung cancer screening for anyone who is at high-risk for lung cancer. You are in the high-risk group if you:      are between the ages of 55 and 79, and    have smoked at least 1 pack of cigarettes a day for 30 or more years, and    still smoke or have quit within the past 15 years.    However, if you have a new cough or shortness of breath, you should talk to your doctor before being screened.    Some national lung health advocacy groups also recommend screening for people ages 50 to 79 who have smoked an average of 1 pack of cigarettes a day for 20 years. They must also have at least 1 other risk factor for lung cancer, not including exposure to secondhand smoke. Other risk factors are having had cancer in the past, emphysema, pulmonary fibrosis, COPD, a family history of lung cancer, or exposure to certain materials such as arsenic, asbestos, beryllium, cadmium, chromium, diesel fumes, nickel, radon or silica. Your care team can help you know if you have one of these risk factors.     Why does it matter if I have symptoms?  Certain symptoms can be a sign that you have a condition in your lungs that should be checked and treated by your doctor. These symptoms include fever, chest pain, a new or changing cough, shortness of breath that you have never felt before, coughing up blood or unexplained weight loss. Having any of these symptoms can greatly affect the results of lung cancer screening.       Should all smokers get an LDCT lung cancer screening exam?  It depends. Lung cancer screening is for a very specific group of men and women who have a history of heavy smoking over a long period of time (see  Who should be screened for lung cancer  above).  I am in the high-risk group, but have been diagnosed with cancer in the past. Is LDCT lung cancer screening right for me?  In some cases, you should not have LDCT lung screening, such as when your doctor is already following your  cancer with CT scan studies. Your doctor will help you decide if LDCT lung screening is right for you.  Do I need to have a screening exam every year?  Yes. If you are in the high-risk group described earlier, you should get an LDCT lung cancer screening exam every year until you are 79, or are no longer willing or able to undergo screening and possible procedures to diagnose and treat lung cancer.  How effective is LDCT at preventing death from lung cancer?  Studies have shown that LDCT lung cancer screening can lower the risk of death from lung cancer by 20 percent in people who are at high-risk.  What are the risks?  There are some risks and limitations of LDCT lung cancer screening. We want to make sure you understand the risks and benefits, so please let us know if you have any questions. Your doctor may want to talk with you more about these risks.    Radiation exposure: As with any exam that uses radiation, there is a very small increased risk of cancer. The amount of radiation in LDCT is small--about the same amount a person would get from a mammogram. Your doctor orders the exam when he or she feels the potential benefits outweigh the risks.    False negatives: No test is perfect, including LDCT. It is possible that you may have a medical condition, including lung cancer, that is not found during your exam. This is called a false negative result.    False positives and more testing: LDCT very often finds something in the lung that could be cancer, but in fact is not. This is called a false positive result. False positive tests often cause anxiety. To make sure these findings are not cancer, you may need to have more tests. These tests will be done only if you give us permission. Sometimes patients need a treatment that can have side effects, such as a biopsy. For more information on false positives, see  What can I expect from the results?     Findings not related to lung cancer: Your LDCT exam also takes  pictures of areas of your body next to your lungs. In a very small number of cases, the CT scan will show an abnormal finding in one of these areas, such as your kidneys, adrenal glands, liver or thyroid. This finding may not be serious, but you may need more tests. Your doctor can help you decide what other tests you may need, if any.  What can I expect from the results?  About 1 out of 4 LDCT exams will find something that may need more tests. Most of the time, these findings are lung nodules. Lung nodules are very small collections of tissue in the lung. These nodules are very common, and the vast majority--more than 97 percent--are not cancer (benign). Most are normal lymph nodes or small areas of scarring from past infections.  But, if a small lung nodule is found to be cancer, the cancer can be cured more than 90 percent of the time. To know if the nodule is cancer, we may need to get more images before your next yearly screening exam. If the nodule has suspicious features (for example, it is large, has an odd shape or grows over time), we will refer you to a specialist for further testing.  Will my doctor also get the results?  Yes. Your doctor will get a copy of your results.  Is it okay to keep smoking now that there s a cancer screening exam?  No. Tobacco is one of the strongest cancer-causing agents. It causes not only lung cancer, but other cancers and cardiovascular (heart) diseases as well. The damage caused by smoking builds over time. This means that the longer you smoke, the higher your risk of disease. While it is never too late to quit, the sooner you quit, the better.  Where can I find help to quit smoking?  The best way to prevent lung cancer is to stop smoking. If you have already quit smoking, congratulations and keep it up! For help on quitting smoking, please call [employer] at 0-874-158-DDTR ([phone]) or the American [employer] Society at 1-303.344.8994 to find local resources near you.  One-on-one  health coaching:  If you d prefer to work individually with a health care provider on tobacco cessation, we offer:      Medication Therapy Management:  Our specially trained pharmacists work closely with you and your doctor to help you quit smoking.  Call [phone] or [phone] (toll free).     Can Do: Health coaching offered by [facility].  [email or web address]

## 2018-05-11 NOTE — PROGRESS NOTES
Mr. Walker is a 73 year old male here:     History of Present Illness:  Taking a half tablet of lisinopril (10 mg) once daily. AM BP was 115/67 at home, 113/68 yesterday before medication. Usually takes BP med between 9-10 am. Cardiac rehab starts at 1 or 3. Concerned about exercising at cardiac rehab, where numbers have been 80-92/42-44 after exercise.    Taking 22 units of Lantus after dinner, Starlix (120 mg) 2-3 times a day before meals. Discontinued Januvia after last visit. Eats 2-3 meals per day. Timing of meals can differ by 2-3 hours day-to-day. Not sure if he is missing any doses of medications.    Brings BP monitor and glucometer today. BP monitors has readings consistently within goal range. Glucometer readings twice daily, nearly every day, with fasting blood glucose range from , values before dinner range from 154-276. Values over 200 are not frequent. Denies symptoms with lows in the 60s. Does not awaken from sleep with lows.    Thinks it would be a good idea to talk to a diabetes educator but would like to try to make adjustments to diet on his own first.    Has avoided lung cancer screening because he feels he has too many appointments.    A full 10-pt Review of Systems was performed, verified and is negative except as documented in the HPI.  All health questionnaires were reviewed, verified and relevant information documented above.      Past Medical History:  Past Medical History:   Diagnosis Date     Anemia      CKD (chronic kidney disease) stage 3, GFR 30-59 ml/min      Heart disease      HTN (hypertension)      Hyperlipidemia      MRSA cellulitis of right foot     in past.      PAD (peripheral artery disease) (H)     s/p stenting in R leg     Tobacco use     50+ pack     Type 2 diabetes mellitus (H)     for 25 yrs.  on insulin and starlix     Venous ulcer        Active Meds:  Current Outpatient Prescriptions   Medication     ammonium lactate (LAC-HYDRIN) 12 % cream     Ascorbic Acid  "(VITAMIN C PO)     ascorbic acid 500 MG TABS     ASPIRIN PO     blood glucose monitoring (FREESTYLE) lancets     clopidogrel (PLAVIX) 75 MG tablet     clopidogrel (PLAVIX) 75 MG tablet     ferrous sulfate (IRON) 325 (65 FE) MG tablet     Gauze Pads & Dressings (OPTIFOAM) 6\"X6\" PADS     gentamicin (GARAMYCIN) 0.1 % cream     insulin glargine (LANTUS SOLOSTAR) 100 UNIT/ML injection     insulin pen needle (B-D U/F) 31G X 8 MM     LISINOPRIL PO     nateglinide (STARLIX) 120 MG tablet     nateglinide (STARLIX) 120 MG tablet     ONETOUCH ULTRA test strip     order for DME     order for DME     order for DME     sildenafil (VIAGRA) 50 MG tablet     silver sulfADIAZINE (SILVADENE) 1 % cream     simvastatin (ZOCOR) 10 MG tablet     sitagliptin (JANUVIA) 100 MG tablet     triamcinolone (KENALOG) 0.1 % cream     VITAMIN D, CHOLECALCIFEROL, PO     No current facility-administered medications for this visit.         Allergies:  Reviewed, refer to EMR    Relevant Social History:  , lives with wife in nursing home.    Physical Exam:  Vitals: /73  Pulse 103  Wt 76.6 kg (168 lb 15.4 oz)  BMI 21.68 kg/m2  Constitutional: Alert, oriented, pleasant, no acute distress  Head: Normocephalic, atraumatic  Eyes: No scleral icterus  Neck: Supple  Cardiovascular: Regular rate and rhythm, no murmurs, rubs or gallops  Respiratory: Good air movement bilaterally, lungs clear, no wheezes/rales/rhonchi  Psychiatric: Normal mentation, affect and mood    Recent Labs:  Lab Results   Component Value Date     03/08/2018      Lab Results   Component Value Date    POTASSIUM 5.1 03/08/2018     Lab Results   Component Value Date    CHLORIDE 104 03/08/2018     Lab Results   Component Value Date    CLAUDIA 7.8 03/08/2018     Lab Results   Component Value Date    CO2 22 03/08/2018     Lab Results   Component Value Date    BUN 26 03/08/2018     Lab Results   Component Value Date    CR 1.09 04/27/2018     Lab Results   Component Value Date    "  03/08/2018     Lab Results   Component Value Date    ALT 14 10/31/2016       Lab Results   Component Value Date    WBC 6.3 04/27/2018     Lab Results   Component Value Date    HGB 10.5 04/27/2018     Lab Results   Component Value Date    HCT 33.5 04/27/2018     Lab Results   Component Value Date    MCV 97 04/27/2018     Lab Results   Component Value Date     04/27/2018         Assessment and Plan:    73 year old male with a history of tobacco dependence, DM2, HTN, CKD, PAD, chronic leg ulcers presents for follow up.    #DM2  A1c 5.8 at last visit. Recently discontinued Januvia. Frequent fasting blood glucose in 60s in absence of symptoms are concerning. Discussed diabetes education appointment, however patient would like to try to make adjustments on his own first.  -Decrease Lantus dose to 18 units  -Continue Starlix  -Will consider diabetes education referral at next visit  -Will check A1c in July    #HTN  Elevated BP in clinic today of 143/73. Recheck was 136/65. Goal remains <130/80. Currently taking Lisinopril in AM. Low pressures during afternoon exercise, concerning for possible dysautonomia. Will continue to monitor closely.  -Take 1/2 tablet Lisinopril (10 mg) at night    #Lung cancer screening  Overdue for screening. Has previously declined.  -CT chest    #Routine Health Maintenence:  Immunizations (zoster, pneumovax, flu, Tdap, Hep A/B):   Most Recent Immunizations   Administered Date(s) Administered     Influenza (High Dose) 3 valent vaccine 10/10/2017     Influenza (IIV3) PF 11/01/2013     Pneumo Conj 13-V (2010&after) 11/03/2014     Pneumococcal 23 valent 12/21/2015     TDAP Vaccine (Boostrix) 03/21/2016     Lipids:   Recent Labs   Lab Test  10/25/17   1309  10/31/16   1205   11/18/14   0853   CHOL  92  123   < >  110   HDL  41  41   < >  45   LDL  30  67   < >  45   TRIG  100  74   < >  100   CHOLHDLRATIO   --    --    --   2.4    < > = values in this interval not displayed.     PSA  (50-75 yrs): No results found for: PSA   AAA Screening (65-75 yrs): CT 12/17, negative  Lung Ca Screening (>30 py 55-79 or >20 py 50-79 + RF): ordered today  Colonoscopy (50-75 yrs): 4/18, recommend repeat 10/18 when off plavix  HIV/HCV if risk factors: nonreactive HepC 6/16  Safety/Lifestyle: not discussed  Tob/EtOH: not discussed  Depression: not discussed  Advanced Directive: not discussed    Return to clinic: 2 months    Allie Cantu, MS3    I, Allie Cantu, MS3, am acting as the scribe for Racheal Swift MD. All aspects of the exam and documentation were approved by the attending physician.      Racheal Swift MD  Internal Medicine        Lung Cancer Screening Shared Decision Making Visit     Amos Walker is eligible for lung cancer screening on the basis of the information provided in my signed lung cancer screening order.     I have discussed with patient the risks and benefits of screening for lung cancer with low-dose CT.     The risks include:   radiation exposure    false positives     over-diagnosis    The benefit of early detection of lung cancer is contingent upon adherence to annual screening or more frequent follow up if indicated.     Furthermore, reaping the benefits of screening requires Amos Walker to be willing and physically able to undergo diagnostic procedures, if indicated. Although no specific guide is available for determining severity of comorbidities, it is reasonable to withhold screening in patients who have greater mortality risk from other diseases.     We did discuss that the only way to prevent lung cancer is to not smoke. Smoking cessation assistance was offered.    I did not offer risk estimation using a calculator such as this one:    ShouldIScreen

## 2018-05-11 NOTE — NURSING NOTE
Chief Complaint   Patient presents with     Recheck Medication     Patient here for medication recheck.       Cesar Frost CMA at 9:42 AM on 5/11/2018.

## 2018-05-12 NOTE — PROGRESS NOTES
Mr. Walker is a 73 year old male here:     History of Present Illness:  Taking a half tablet of lisinopril (10 mg) once daily. AM BP was 115/67 at home, 113/68 yesterday before medication. Usually takes BP med between 9-10 am. Cardiac rehab starts at 1 or 3. Concerned about exercising at cardiac rehab, where numbers have been 80-92/42-44 after exercise.    Taking 22 units of Lantus after dinner, Starlix (120 mg) 2-3 times a day before meals. Discontinued Januvia after last visit. Eats 2-3 meals per day. Timing of meals can differ by 2-3 hours day-to-day. Not sure if he is missing any doses of medications.    Brings BP monitor and glucometer today. BP monitors has readings consistently within goal range. Glucometer readings twice daily, nearly every day, with fasting blood glucose range from , values before dinner range from 154-276. Values over 200 are not frequent. Denies symptoms with lows in the 60s. Does not awaken from sleep with lows.    Thinks it would be a good idea to talk to a diabetes educator but would like to try to make adjustments to diet on his own first.    Has avoided lung cancer screening because he feels he has too many appointments.    A full 10-pt Review of Systems was performed, verified and is negative except as documented in the HPI.  All health questionnaires were reviewed, verified and relevant information documented above.      Past Medical History:  Past Medical History:   Diagnosis Date     Anemia      CKD (chronic kidney disease) stage 3, GFR 30-59 ml/min      Heart disease      HTN (hypertension)      Hyperlipidemia      MRSA cellulitis of right foot     in past.      PAD (peripheral artery disease) (H)     s/p stenting in R leg     Tobacco use     50+ pack     Type 2 diabetes mellitus (H)     for 25 yrs.  on insulin and starlix     Venous ulcer        Active Meds:  Current Outpatient Prescriptions   Medication     ammonium lactate (LAC-HYDRIN) 12 % cream     Ascorbic Acid  "(VITAMIN C PO)     ascorbic acid 500 MG TABS     ASPIRIN PO     blood glucose monitoring (FREESTYLE) lancets     clopidogrel (PLAVIX) 75 MG tablet     clopidogrel (PLAVIX) 75 MG tablet     ferrous sulfate (IRON) 325 (65 FE) MG tablet     Gauze Pads & Dressings (OPTIFOAM) 6\"X6\" PADS     gentamicin (GARAMYCIN) 0.1 % cream     insulin glargine (LANTUS SOLOSTAR) 100 UNIT/ML injection     insulin pen needle (B-D U/F) 31G X 8 MM     LISINOPRIL PO     nateglinide (STARLIX) 120 MG tablet     nateglinide (STARLIX) 120 MG tablet     ONETOUCH ULTRA test strip     order for DME     order for DME     order for DME     sildenafil (VIAGRA) 50 MG tablet     silver sulfADIAZINE (SILVADENE) 1 % cream     simvastatin (ZOCOR) 10 MG tablet     sitagliptin (JANUVIA) 100 MG tablet     triamcinolone (KENALOG) 0.1 % cream     VITAMIN D, CHOLECALCIFEROL, PO     No current facility-administered medications for this visit.         Allergies:  Reviewed, refer to EMR    Relevant Social History:  , lives with wife in nursing home.    Physical Exam:  Vitals: /73  Pulse 103  Wt 76.6 kg (168 lb 15.4 oz)  BMI 21.68 kg/m2  Constitutional: Alert, oriented, pleasant, no acute distress  Head: Normocephalic, atraumatic  Eyes: No scleral icterus  Neck: Supple  Cardiovascular: Regular rate and rhythm, no murmurs, rubs or gallops  Respiratory: Good air movement bilaterally, lungs clear, no wheezes/rales/rhonchi  Psychiatric: Normal mentation, affect and mood    Recent Labs:  Lab Results   Component Value Date     03/08/2018      Lab Results   Component Value Date    POTASSIUM 5.1 03/08/2018     Lab Results   Component Value Date    CHLORIDE 104 03/08/2018     Lab Results   Component Value Date    CLAUDIA 7.8 03/08/2018     Lab Results   Component Value Date    CO2 22 03/08/2018     Lab Results   Component Value Date    BUN 26 03/08/2018     Lab Results   Component Value Date    CR 1.09 04/27/2018     Lab Results   Component Value Date    "  03/08/2018     Lab Results   Component Value Date    ALT 14 10/31/2016       Lab Results   Component Value Date    WBC 6.3 04/27/2018     Lab Results   Component Value Date    HGB 10.5 04/27/2018     Lab Results   Component Value Date    HCT 33.5 04/27/2018     Lab Results   Component Value Date    MCV 97 04/27/2018     Lab Results   Component Value Date     04/27/2018         Assessment and Plan:    73 year old male with a history of tobacco dependence, DM2, HTN, CKD, PAD, chronic leg ulcers presents for follow up.    #DM2  A1c 5.8 at last visit. Recently discontinued Januvia. Frequent fasting blood glucose in 60s in absence of symptoms are concerning. Discussed diabetes education appointment, however patient would like to try to make adjustments on his own first.  -Decrease Lantus dose to 18 units  -Continue Starlix  -Will consider diabetes education referral at next visit  -Will check A1c in July    #HTN  Elevated BP in clinic today of 143/73. Recheck was 136/65. Goal remains <130/80. Currently taking Lisinopril in AM. Low pressures during afternoon exercise, concerning for possible dysautonomia. Will continue to monitor closely.  -Take 1/2 tablet Lisinopril (10 mg) at night    #Lung cancer screening  Overdue for screening. Has previously declined.  -CT chest  ---Lung-RADS Category 2, benign appearance or behavior, continue annual screening  ---Mild COPD, will consider PFTs/PRN albuterol at next visit  ---Moderate coronary artery calcification with coronary stents  -Will discuss tobacco cessation at next visit    #Routine Health Maintenence:  Immunizations (zoster, pneumovax, flu, Tdap, Hep A/B):   Most Recent Immunizations   Administered Date(s) Administered     Influenza (High Dose) 3 valent vaccine 10/10/2017     Influenza (IIV3) PF 11/01/2013     Pneumo Conj 13-V (2010&after) 11/03/2014     Pneumococcal 23 valent 12/21/2015     TDAP Vaccine (Boostrix) 03/21/2016     Lipids:   Recent Labs   Lab  Test  10/25/17   1309  10/31/16   1205   11/18/14   0853   CHOL  92  123   < >  110   HDL  41  41   < >  45   LDL  30  67   < >  45   TRIG  100  74   < >  100   CHOLHDLRATIO   --    --    --   2.4    < > = values in this interval not displayed.     PSA (50-75 yrs): No results found for: PSA   AAA Screening (65-75 yrs): CT 12/17, negative  Lung Ca Screening (>30 py 55-79 or >20 py 50-79 + RF): 5/11/17, rec annual follow up  Colonoscopy (50-75 yrs): 4/18, recommend repeat 10/18 when off plavix  HIV/HCV if risk factors: nonreactive HepC 6/16  Safety/Lifestyle: not discussed  Tob/EtOH: not discussed  Depression: not discussed  Advanced Directive: not discussed    Return to clinic: 2 months    Allie Cantu, MS3    I, Allie Cantu, MS3, am acting as the scribe for Racheal Swift MD. All aspects of the exam and documentation were approved by the attending physician.    Attending Addendum:  The medical student acted as scribe.  The patient was seen and examined with medical student.  The history and physical were independently verified by myself.  The above documentation represents our joint assessment and plan.  Racheal Swift MD  Internal Medicine    25 min spent face to face, of which >50% time spent on counseling/coordinating care exclusive of any procedure time      Lung Cancer Screening Shared Decision Making Visit     Amos Walker is eligible for lung cancer screening on the basis of the information provided in my signed lung cancer screening order.     I have discussed with patient the risks and benefits of screening for lung cancer with low-dose CT.     The risks include:   radiation exposure    false positives     over-diagnosis    The benefit of early detection of lung cancer is contingent upon adherence to annual screening or more frequent follow up if indicated.     Furthermore, reaping the benefits of screening requires Amos Walker to be willing and physically able to undergo diagnostic  procedures, if indicated. Although no specific guide is available for determining severity of comorbidities, it is reasonable to withhold screening in patients who have greater mortality risk from other diseases.     We did discuss that the only way to prevent lung cancer is to not smoke. Smoking cessation assistance was offered.    I did not offer risk estimation using a calculator such as this one:    ShouldISctg Swift MD  Internal Medicine

## 2018-05-14 ENCOUNTER — MYC MEDICAL ADVICE (OUTPATIENT)
Dept: INTERNAL MEDICINE | Facility: CLINIC | Age: 73
End: 2018-05-14

## 2018-05-14 ENCOUNTER — HOSPITAL ENCOUNTER (OUTPATIENT)
Dept: CARDIAC REHAB | Facility: CLINIC | Age: 73
End: 2018-05-14
Attending: INTERNAL MEDICINE
Payer: MEDICARE

## 2018-05-14 DIAGNOSIS — I10 BENIGN ESSENTIAL HYPERTENSION: Primary | ICD-10-CM

## 2018-05-14 PROCEDURE — 40000116 ZZH STATISTIC OP CR VISIT

## 2018-05-14 PROCEDURE — 93798 PHYS/QHP OP CAR RHAB W/ECG: CPT

## 2018-05-14 RX ORDER — BLOOD PRESSURE TEST KIT
1 KIT MISCELLANEOUS DAILY
Qty: 1 KIT | Refills: 0 | Status: SHIPPED | OUTPATIENT
Start: 2018-05-14 | End: 2020-06-03

## 2018-05-15 ENCOUNTER — OFFICE VISIT (OUTPATIENT)
Dept: PODIATRY | Facility: CLINIC | Age: 73
End: 2018-05-15
Payer: MEDICARE

## 2018-05-15 VITALS — DIASTOLIC BLOOD PRESSURE: 66 MMHG | HEART RATE: 102 BPM | OXYGEN SATURATION: 98 % | SYSTOLIC BLOOD PRESSURE: 138 MMHG

## 2018-05-15 DIAGNOSIS — E11.49 TYPE II OR UNSPECIFIED TYPE DIABETES MELLITUS WITH NEUROLOGICAL MANIFESTATIONS, NOT STATED AS UNCONTROLLED(250.60) (H): ICD-10-CM

## 2018-05-15 DIAGNOSIS — E11.51 DIABETES MELLITUS WITH PERIPHERAL VASCULAR DISEASE (H): ICD-10-CM

## 2018-05-15 DIAGNOSIS — L97.512 SKIN ULCER OF RIGHT FOOT WITH FAT LAYER EXPOSED (H): ICD-10-CM

## 2018-05-15 DIAGNOSIS — L97.521 SKIN ULCER OF LEFT FOOT, LIMITED TO BREAKDOWN OF SKIN (H): ICD-10-CM

## 2018-05-15 DIAGNOSIS — L97.912 ULCER OF RIGHT LOWER EXTREMITY WITH FAT LAYER EXPOSED (H): Primary | ICD-10-CM

## 2018-05-15 PROCEDURE — 99213 OFFICE O/P EST LOW 20 MIN: CPT | Performed by: PODIATRIST

## 2018-05-15 RX ORDER — CEFADROXIL 500 MG/1
500 CAPSULE ORAL 2 TIMES DAILY
Qty: 28 CAPSULE | Refills: 0 | Status: SHIPPED | OUTPATIENT
Start: 2018-05-15 | End: 2018-05-29
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
## 2018-05-15 NOTE — PATIENT INSTRUCTIONS
Thanks for coming today.  [facility]  [address] [place] [address]    To schedule future appointments in Ortho Clinic, you may call [phone].    To schedule ordered imaging by your Provider: Call [facility] at [phone]    [employer] available online at:   [email or web address]    Please call if any further questions or concerns [phone] and ask for the Orthopedic Department. Clinic hours 8 am to 5 pm.    Return to clinic if symptoms worsen.
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
## 2018-05-15 NOTE — MR AVS SNAPSHOT
After Visit Summary   5/15/2018    Amos Walker    MRN: [id number]           Patient Information     Date Of Birth          1945        Visit Information        Provider Department      5/15/2018 8:30 AM Brayan Mcclain DPM [facility]        Today's Diagnoses     Ulcer of right lower extremity with fat layer exposed (H)    -  1    Skin ulcer of right foot with fat layer exposed (H)        Skin ulcer of left foot, limited to breakdown of skin (H)        Type II or unspecified type diabetes mellitus with neurological manifestations, not stated as uncontrolled(250.60) (H)        Diabetes mellitus with peripheral vascular disease (H)          Care Instructions    Thanks for coming today.  [facility]  [address] [place] [address]    To schedule future appointments in Ortho Clinic, you may call [phone].    To schedule ordered imaging by your Provider: Call [facility] at [phone]    [employer] available online at:   [email or web address]    Please call if any further questions or concerns [phone] and ask for the Orthopedic Department. Clinic hours 8 am to 5 pm.    Return to clinic if symptoms worsen.            Follow-ups after your visit        Your next 10 appointments already scheduled     May 16, 2018  1:00 PM CDT   Cardiac Treatment with Ur Cardiac Rehab 1   [facility], Cardiac Rehabilitation ([facility])    [address] [place] [address]   [phone]            May 18, 2018  1:00 PM CDT   Cardiac Treatment with Ur Cardiac Rehab 1   [facility], Cardiac Rehabilitation ([facility])    [address] [place] [address]   [phone]            May 21, 2018  1:00 PM CDT   Cardiac Treatment with Ur Cardiac Rehab 1   [facility],  Cardiac Rehabilitation ([facility])    [facility] [address] 1st Floor [address]  [employer] [address]   [phone]            May 22, 2018  8:00 AM CDT   Return Visit with Brayan Mcclain DPM   [facility]    [address] [address] [place] [address]   [phone]            May 22, 2018  1:30 PM CDT   Return Visit with Rubio Chinchilla MD   [facility] AT [facility] ([facility])    [address] [place] [address] 2nd Floor  [facility] [address]   [phone]            May 23, 2018  1:00 PM CDT   Cardiac Treatment with Ur Cardiac Rehab 1   [facility], Cardiac Rehabilitation ([facility])    [address] [place] [address] 1st Floor [address] [address]   [phone]            May 25, 2018  1:00 PM CDT   Cardiac Treatment with Ur Cardiac Rehab 1   [facility], Cardiac Rehabilitation ([facility])    [address] [place] [address] 1st Floor [address]  [employer] [address]   [phone]            May 29, 2018  8:30 AM CDT   Return Visit with Brayan Mcclain DPM   [facility]    [address] [place] [address]   [phone]            Jun 05, 2018  8:00 AM CDT   Return Visit with Brayan Mcclain DPM   [facility] ([facility])    [address] [facility] [address] [phone]            Jun 12, 2018  9:00 AM CDT   Return Visit with Brayan Mcclain DPM   [facility] ([facility])    [address] [place] [address] [phone]              Who to contact     If you have questions or need follow up information about today's clinic visit or your schedule please contact [facility] directly at [phone].  Normal or non-critical lab and imaging results will be communicated to you by AskBothart, letter or phone within 4 business days after the clinic has received the results. If you do not hear from us within 7 days, please contact the clinic through [employer] or phone. If you have a critical or abnormal lab result, we will notify you by phone as soon as possible.  Submit refill requests through [employer] or call your pharmacy and they will forward the refill request to us. Please allow 3 business days for your refill to be completed.          Additional Information About Your Visit        [email or web address] Information     [employer] gives you secure access to your electronic health record. If you see a primary care provider, you can also send messages to your care team and make appointments. If you have questions, please call your primary care clinic.  If you do not have a primary care provider, please call [phone] and they will assist you.      [employer] is an electronic gateway that provides easy, online access to your medical records. With [employer], you can request a clinic appointment, read your test results, renew a prescription or communicate with your care team.     To access your existing account, please contact your [facility] or call [phone] for assistance.        Care EveryWhere ID     This is your Care EveryWhere ID. This could be used by other organizations to access your [facility] medical records  [id number]        Your Vitals Were     Pulse Pulse Oximetry                102 98%           Blood Pressure from Last 3 Encounters:   05/15/18 138/66   05/11/18 119/76   05/08/18 130/60    Weight from Last 3 Encounters:   05/11/18 76.6 kg (168 lb 15.4 oz)   04/27/18 77.1 kg (170 lb)   04/26/18 76.2 kg (168 lb)              Today, you had the following     No orders found for display         Today's Medication Changes          These changes are accurate as of  5/15/18  2:56 PM.  If you have any questions, ask your nurse or doctor.               Start taking these medicines.        Dose/Directions    cefadroxil 500 MG capsule   Commonly known as:  DURICEF   Used for:  Skin ulcer of left foot, limited to breakdown of skin (H), Type II or unspecified type diabetes mellitus with neurological manifestations, not stated as uncontrolled(250.60) (H), Diabetes mellitus with peripheral vascular disease (H)        Dose:  500 mg   Take 1 capsule (500 mg) by mouth 2 times daily   Quantity:  28 capsule   Refills:  0         These medicines have changed or have updated prescriptions.        Dose/Directions    * clopidogrel 75 MG tablet   Commonly known as:  PLAVIX   This may have changed:  when to take this   Used for:  Peripheral vascular disease, unspecified        Dose:  75 mg   Take 1 tablet (75 mg) by mouth daily   Quantity:  30 tablet   Refills:  11       * clopidogrel 75 MG tablet   Commonly known as:  PLAVIX   This may have changed:  Another medication with the same name was changed. Make sure you understand how and when to take each.   Used for:  Peripheral vascular disease (H)        Dose:  75 mg   Take 1 tablet (75 mg) by mouth daily   Quantity:  30 tablet   Refills:  3       gentamicin 0.1 % cream   Commonly known as:  GARAMYCIN   This may have changed:    - when to take this  - reasons to take this  - additional instructions   Used for:  Ulcer of right lower leg, with fat layer exposed (H), Chronic venous hypertension with ulcer involving right side (H), Type 2 diabetes, controlled, with neuropathy (H)        Apply topically daily To right leg ulcer.   Quantity:  30 g   Refills:  5       * Notice:  This list has 2 medication(s) that are the same as other medications prescribed for you. Read the directions carefully, and ask your doctor or other care provider to review them with you.         Where to get your medicines      These medications were sent to [facility]  [id number] - [place] [address] AT [facility] [address]  [address], [facility] [address]     Phone:  [phone]     cefadroxil 500 MG capsule                Primary Care Provider Office Phone # Fax #    Racheal Swift [phone]       [address] [address]        Equal Access to Services     SAMSON MELTON : Hadii aad ku hadasho Soomaali, waaxda luqadaha, qaybta kaalmada adeegyada, waxay idiin hayaan adeeg kharash laking lopez. So [facility] [phone].    ATENCIÓN: Si evelinala espjono, tiene a rodrigues disposición servicios gratuitos de asistencia lingüística. [facility] [phone].    We comply with applicable federal civil rights laws and [place] laws. We do not discriminate on the basis of race, color, national origin, age, disability, sex, sexual orientation, or gender identity.            Thank you!     Thank you for choosing [facility]  for your care. Our goal is always to provide you with excellent care. Hearing back from our patients is one way we can continue to improve our services. Please take a few minutes to complete the written survey that you may receive in the mail after your visit with us. Thank you!             Your Updated Medication List - Protect others around you: Learn how to safely use, store and throw away your medicines at www.disposemymeds.org.          This list is accurate as of 5/15/18  2:56 PM.  Always use your most recent med list.                   Brand Name Dispense Instructions for use Diagnosis    ammonium lactate 12 % cream    LAC-HYDRIN    385 g    Apply topically 2 times daily as needed for dry skin    Venous stasis, Type 2 diabetes, controlled, with neuropathy (H)       ascorbic acid 500 MG Tabs     30 tablet    Take 1 tablet (500 mg) by mouth 2 times daily    Ulcer of right lower leg, with fat layer exposed (H)       ASPIRIN PO      Take 81 mg by mouth daily        blood glucose monitoring lancets      3 Box    Use to test blood sugars 2 as directed.    Type 2 diabetes, uncontrolled, with neuropathy (H)       Blood Pressure Kit     1 kit    1 Device daily    Benign essential hypertension       cefadroxil 500 MG capsule    DURICEF    28 capsule    Take 1 capsule (500 mg) by mouth 2 times daily    Skin ulcer of left foot, limited to breakdown of skin (H), Type II or unspecified type diabetes mellitus with neurological manifestations, not stated as uncontrolled(250.60) (H), Diabetes mellitus with peripheral vascular disease (H)       * clopidogrel 75 MG tablet    PLAVIX    30 tablet    Take 1 tablet (75 mg) by mouth daily    Peripheral vascular disease, unspecified       * clopidogrel 75 MG tablet    PLAVIX    30 tablet    Take 1 tablet (75 mg) by mouth daily    Peripheral vascular disease (H)       ferrous sulfate 325 (65 Fe) MG tablet    IRON    60 tablet    Take 1 tablet (325 mg) by mouth 2 times daily    Peripheral vascular disease, unspecified       gentamicin 0.1 % cream    GARAMYCIN    30 g    Apply topically daily To right leg ulcer.    Ulcer of right lower leg, with fat layer exposed (H), Chronic venous hypertension with ulcer involving right side (H), Type 2 diabetes, controlled, with neuropathy (H)       insulin pen needle 31G X 8 MM    B-D U/F    100 each    Use 1 daily o as directed    Diabetes mellitus, type II (H)       LANTUS SOLOSTAR 100 UNIT/ML injection   Generic drug:  insulin glargine     3 mL    Inject 18 Units Subcutaneous daily (with dinner) May take up to 25 units daily    Type 2 diabetes mellitus with diabetic peripheral angiopathy without gangrene, with long-term current use of insulin (H)       LISINOPRIL PO      Take 20 mg by mouth 2 times daily        * nateglinide 120 MG tablet    STARLIX    90 tablet    TAKE 1 TABLET BY MOUTH THREE TIMES DAILY BEFORE MEALS    Type 2 diabetes, controlled, with neuropathy (H)       * nateglinide 120 MG tablet    STARLIX    90 tablet    Take 1 tablet (120  "mg) by mouth 3 times daily (before meals)    Diabetes mellitus (H)       ONETOUCH ULTRA test strip   Generic drug:  blood glucose monitoring     200 strip    USE TO TEST TWICE DAILY OR AS DIRECTED    Type 2 diabetes mellitus (H)       OPTIFOAM 6\"X6\" Pads     1 each    1 Box once a week    Ulcer of right leg, with fat layer exposed (H)       order for DME     2 each    Please measure and distribute 1 pair of 20mm Hg - 30mm Hg knee high ULCER CARE open or closed toe compression stockings.  Patient has a size 13 foot and please take this into consideration.  Jobst or equivalent    Varicose veins of lower extremities with other complications, Venous stasis ulcer of right lower extremity (H)       order for DME     3 each    Please measure and distribute 1 pair of 20mmHg - 30mmHg knee high open or closed toe compression stockings. Jobst ultrasheer or equivalent.    Varicose veins of both lower extremities with complications       order for DME     2 each    Please measure and distribute 1 pair of 30mmHg - 40mmHg knee high open toe ulcercare compression stockings. Jobst ultrasheer or equivalent.    Varicose veins of bilateral lower extremities with other complications       sildenafil 50 MG tablet    VIAGRA    10 tablet    Take 1 tablet (50 mg) by mouth daily as needed for erectile dysfunction    Vasculogenic erectile dysfunction, unspecified vasculogenic erectile dysfunction type       silver sulfADIAZINE 1 % cream    SILVADENE    85 g    Apply topically daily To affected areas on right foot and leg.    Ulcer of right lower leg, with fat layer exposed (H), Chronic venous hypertension with ulcer involving right side (H), Type 2 diabetes, controlled, with neuropathy (H)       simvastatin 10 MG tablet    ZOCOR    90 tablet    Take 1 tablet (10 mg) by mouth At Bedtime    Type 2 diabetes, controlled, with neuropathy (H)       triamcinolone 0.1 % cream    KENALOG    30 g    Apply sparingly to left heel daily.    Dermatitis of " left foot       VITAMIN C PO      Take 1,000 mg by mouth        VITAMIN D (CHOLECALCIFEROL) PO      Take 1,000 Units by mouth 2 times daily        * Notice:  This list has 4 medication(s) that are the same as other medications prescribed for you. Read the directions carefully, and ask your doctor or other care provider to review them with you.

## 2018-05-15 NOTE — NURSING NOTE
Amos Walker's chief complaint for this visit includes:  Chief Complaint   Patient presents with     RECHECK     Leg ulcer check      PCP: Racheal Swift    Referring Provider:  No referring provider defined for this encounter.    /66  Pulse 102  SpO2 98% Data Unavailable

## 2018-05-17 NOTE — TELEPHONE ENCOUNTER
Rx for blood pressure kit faxed to [id number] - [place], [place] - [address] AT [facility] [address]    Lauren Rg LPN

## 2018-05-18 ENCOUNTER — HOSPITAL ENCOUNTER (OUTPATIENT)
Dept: CARDIAC REHAB | Facility: CLINIC | Age: 73
End: 2018-05-18
Attending: INTERNAL MEDICINE
Payer: MEDICARE

## 2018-05-18 PROCEDURE — 93798 PHYS/QHP OP CAR RHAB W/ECG: CPT | Performed by: REHABILITATION PRACTITIONER

## 2018-05-18 PROCEDURE — 40000116 ZZH STATISTIC OP CR VISIT: Performed by: REHABILITATION PRACTITIONER

## 2018-05-21 ENCOUNTER — HOSPITAL ENCOUNTER (OUTPATIENT)
Dept: CARDIAC REHAB | Facility: CLINIC | Age: 73
End: 2018-05-21
Attending: INTERNAL MEDICINE
Payer: MEDICARE

## 2018-05-21 PROCEDURE — 93798 PHYS/QHP OP CAR RHAB W/ECG: CPT

## 2018-05-21 PROCEDURE — 40000116 ZZH STATISTIC OP CR VISIT

## 2018-05-22 ENCOUNTER — OFFICE VISIT (OUTPATIENT)
Dept: CARDIOLOGY | Facility: CLINIC | Age: 73
End: 2018-05-22
Payer: MEDICARE

## 2018-05-22 VITALS
DIASTOLIC BLOOD PRESSURE: 74 MMHG | HEIGHT: 74 IN | WEIGHT: 171 LBS | HEART RATE: 87 BPM | BODY MASS INDEX: 21.94 KG/M2 | OXYGEN SATURATION: 100 % | SYSTOLIC BLOOD PRESSURE: 131 MMHG

## 2018-05-22 DIAGNOSIS — I73.9 PAD (PERIPHERAL ARTERY DISEASE) (H): ICD-10-CM

## 2018-05-22 DIAGNOSIS — E11.621 DIABETIC ULCER OF RIGHT FOOT ASSOCIATED WITH TYPE 2 DIABETES MELLITUS, UNSPECIFIED PART OF FOOT, UNSPECIFIED ULCER STAGE (H): ICD-10-CM

## 2018-05-22 DIAGNOSIS — Z95.5 PRESENCE OF STENT IN LAD CORONARY ARTERY: ICD-10-CM

## 2018-05-22 DIAGNOSIS — I10 BENIGN ESSENTIAL HYPERTENSION: ICD-10-CM

## 2018-05-22 DIAGNOSIS — Z98.890 S/P CORONARY ANGIOGRAM: Primary | ICD-10-CM

## 2018-05-22 DIAGNOSIS — L97.519 DIABETIC ULCER OF RIGHT FOOT ASSOCIATED WITH TYPE 2 DIABETES MELLITUS, UNSPECIFIED PART OF FOOT, UNSPECIFIED ULCER STAGE (H): ICD-10-CM

## 2018-05-22 PROCEDURE — 99214 OFFICE O/P EST MOD 30 MIN: CPT | Performed by: INTERNAL MEDICINE

## 2018-05-22 NOTE — MR AVS SNAPSHOT
After Visit Summary   5/22/2018    Amos Walker    MRN: [id number]           Patient Information     Date Of Birth          1945        Visit Information        Provider Department      5/22/2018 1:30 PM Rubio Chinchilla MD [facility] PHYSICIANS HEART AT [facility]        Care Instructions    Thank you for coming to the [facility] Heart @ [facility]; please note the following instructions:    1. Dr. Rubio Chinchilla would like you to return for a cardiac follow up in 1 year  (May).  We will contact you regarding your appointment when the time draws closer or you may call [phone] to arrange an appointment.  Mean while, if you should have any questions or concerns regarding your heart health, please contact us.  Thank you for choosing [facility] for your care.          If you have any questions regarding your visit please contact your care team:     Cardiology  Telephone Number   Kathleen WHITING, SAFIA CORONA, SAFIA REBOLLEDO, SHANNEN LANE MA   [phone]    *After hours: [phone]   For scheduling appts:     [phone] or    [phone] (select option 1)    *After hours: [phone]     For the Device Clinic (Pacemakers and ICD's)  RN's :  Ciara Payne   During business hours: [phone]    *After business hours:  [phone] (select option 4)      Normal test result notifications will be released via [employer] or mailed within 7 business days.  All other test results, will be communicated via telephone once reviewed by your cardiologist.    If you need a medication refill please contact your pharmacy.  Please allow 3 business days for your refill to be completed.    As always, thank you for trusting us with your health care needs!            Follow-ups after your visit        Your next 10 appointments already scheduled     May 23, 2018  1:00 PM CDT   Cardiac Treatment with Ur Cardiac Rehab 1   [facility], Cardiac Rehabilitation  ([facility])    [address]  [facility] [address]   [phone]            May 25, 2018  1:00 PM CDT   Cardiac Treatment with Ur Cardiac Rehab 1   [facility], Cardiac Rehabilitation ([facility])    [address]  [facility] [address]   [phone]            May 29, 2018  8:30 AM CDT   Return Visit with Brayan Mcclain DPM   [facility] ([facility])    [address] [place] [address]   [phone]            May 29, 2018  4:30 PM CDT   Cardiac Treatment with Ur Cardiac Rehab 1   [facility], Cardiac Rehabilitation ([facility])    [address]  [facility] [address]   [phone]            May 30, 2018  3:00 PM CDT   Cardiac Treatment with Ur Cardiac Rehab 1   [facility], Cardiac Rehabilitation ([facility])    [address]  [facility] [address]   [phone]            Jun 01, 2018  3:00 PM CDT   Cardiac Treatment with Ur Cardiac Rehab 1   [facility], Cardiac Rehabilitation ([facility])    [address]  [facility] [address]   [phone]            Jun 05, 2018  8:00 AM CDT   Return Visit with Brayan Mcclain DPM   [facility] ([facility])    [address] [facility] [address]   [phone]            Jun 12, 2018  9:00 AM CDT   Return Visit with Brayan Mcclain DPM   [facility] ([facility])    [address] [facility] [address]   [phone]            Jun 19, 2018 10:00 AM CDT   Return Visit with Brayan  "DONNELL Mcclain   [facility] ([facility])    [address] [place] [address] [phone]            Jun 20, 2018 10:30 AM CDT   RETURN RETINA with Jen Aranda MD   [facility] ([facility])    [address]  [facility]  [facility] [address] [phone]              Who to contact     If you have questions or need follow up information about today's clinic visit or your schedule please contact [facility] PHYSICIANS HEART AT [facility] directly at [phone].  Normal or non-critical lab and imaging results will be communicated to you by MyChart, letter or phone within 4 business days after the clinic has received the results. If you do not hear from us within 7 days, please contact the clinic through [employer] or phone. If you have a critical or abnormal lab result, we will notify you by phone as soon as possible.  Submit refill requests through [employer] or call your pharmacy and they will forward the refill request to us. Please allow 3 business days for your refill to be completed.          Additional Information About Your Visit        [employer] Information     [employer] gives you secure access to your electronic health record. If you see a primary care provider, you can also send messages to your care team and make appointments. If you have questions, please call your primary care clinic.  If you do not have a primary care provider, please call [phone] and they will assist you.        Care EveryWhere ID     This is your Care EveryWhere ID. This could be used by other organizations to access your [facility] medical records  [id number]        Your Vitals Were     Pulse Height Pulse Oximetry BMI (Body Mass Index)          87 1.88 m (6' 2\") 100% 21.96 kg/m2         Blood Pressure from Last 3 Encounters:   05/22/18 131/74   05/15/18 138/66   05/11/18 119/76    Weight from Last 3 " Encounters:   05/22/18 77.6 kg (171 lb)   05/11/18 76.6 kg (168 lb 15.4 oz)   04/27/18 77.1 kg (170 lb)              Today, you had the following     No orders found for display         Today's Medication Changes          These changes are accurate as of 5/22/18  2:14 PM.  If you have any questions, ask your nurse or doctor.               These medicines have changed or have updated prescriptions.        Dose/Directions    * clopidogrel 75 MG tablet   Commonly known as:  PLAVIX   This may have changed:  when to take this   Used for:  Peripheral vascular disease, unspecified        Dose:  75 mg   Take 1 tablet (75 mg) by mouth daily   Quantity:  30 tablet   Refills:  11       * clopidogrel 75 MG tablet   Commonly known as:  PLAVIX   This may have changed:  Another medication with the same name was changed. Make sure you understand how and when to take each.   Used for:  Peripheral vascular disease (H)        Dose:  75 mg   Take 1 tablet (75 mg) by mouth daily   Quantity:  30 tablet   Refills:  3       gentamicin 0.1 % cream   Commonly known as:  GARAMYCIN   This may have changed:    - when to take this  - reasons to take this  - additional instructions   Used for:  Ulcer of right lower leg, with fat layer exposed (H), Chronic venous hypertension with ulcer involving right side (H), Type 2 diabetes, controlled, with neuropathy (H)        Apply topically daily To right leg ulcer.   Quantity:  30 g   Refills:  5       * Notice:  This list has 2 medication(s) that are the same as other medications prescribed for you. Read the directions carefully, and ask your doctor or other care provider to review them with you.             Primary Care Provider Office Phone # Fax #    Racheal Swift [phone]       [address] [address]        Equal Access to Services     SAMSON MELTON AH: Nataliia Bedoya, hollie harrington waxay idiin hayaan adeeg  johnnie duran ah. So [facility] [phone].    ATENCIÓN: Si pancho wagner, tiene a rodrigues disposición servicios gratuitos de asistencia lingüística. Mary sparrow [phone].    We comply with applicable federal civil rights laws and [place] laws. We do not discriminate on the basis of race, color, national origin, age, disability, sex, sexual orientation, or gender identity.            Thank you!     Thank you for choosing [facility] PHYSICIANS HEART AT [facility]  for your care. Our goal is always to provide you with excellent care. Hearing back from our patients is one way we can continue to improve our services. Please take a few minutes to complete the written survey that you may receive in the mail after your visit with us. Thank you!             Your Updated Medication List - Protect others around you: Learn how to safely use, store and throw away your medicines at www.disposemymeds.org.          This list is accurate as of 5/22/18  2:14 PM.  Always use your most recent med list.                   Brand Name Dispense Instructions for use Diagnosis    ammonium lactate 12 % cream    LAC-HYDRIN    385 g    Apply topically 2 times daily as needed for dry skin    Venous stasis, Type 2 diabetes, controlled, with neuropathy (H)       ascorbic acid 500 MG Tabs     30 tablet    Take 1 tablet (500 mg) by mouth 2 times daily    Ulcer of right lower leg, with fat layer exposed (H)       ASPIRIN PO      Take 81 mg by mouth daily        blood glucose monitoring lancets     3 Box    Use to test blood sugars 2 as directed.    Type 2 diabetes, uncontrolled, with neuropathy (H)       Blood Pressure Kit     1 kit    1 Device daily    Benign essential hypertension       cefadroxil 500 MG capsule    DURICEF    28 capsule    Take 1 capsule (500 mg) by mouth 2 times daily    Skin ulcer of left foot, limited to breakdown of skin (H), Type II or unspecified type diabetes mellitus with neurological manifestations, not stated  "as uncontrolled(250.60) (H), Diabetes mellitus with peripheral vascular disease (H)       * clopidogrel 75 MG tablet    PLAVIX    30 tablet    Take 1 tablet (75 mg) by mouth daily    Peripheral vascular disease, unspecified       * clopidogrel 75 MG tablet    PLAVIX    30 tablet    Take 1 tablet (75 mg) by mouth daily    Peripheral vascular disease (H)       ferrous sulfate 325 (65 Fe) MG tablet    IRON    60 tablet    Take 1 tablet (325 mg) by mouth 2 times daily    Peripheral vascular disease, unspecified       gentamicin 0.1 % cream    GARAMYCIN    30 g    Apply topically daily To right leg ulcer.    Ulcer of right lower leg, with fat layer exposed (H), Chronic venous hypertension with ulcer involving right side (H), Type 2 diabetes, controlled, with neuropathy (H)       insulin pen needle 31G X 8 MM    B-D U/F    100 each    Use 1 daily o as directed    Diabetes mellitus, type II (H)       LANTUS SOLOSTAR 100 UNIT/ML injection   Generic drug:  insulin glargine     3 mL    Inject 18 Units Subcutaneous daily (with dinner) May take up to 25 units daily    Type 2 diabetes mellitus with diabetic peripheral angiopathy without gangrene, with long-term current use of insulin (H)       LISINOPRIL PO      Take 20 mg by mouth 2 times daily        * nateglinide 120 MG tablet    STARLIX    90 tablet    TAKE 1 TABLET BY MOUTH THREE TIMES DAILY BEFORE MEALS    Type 2 diabetes, controlled, with neuropathy (H)       * nateglinide 120 MG tablet    STARLIX    90 tablet    Take 1 tablet (120 mg) by mouth 3 times daily (before meals)    Diabetes mellitus (H)       ONETOUCH ULTRA test strip   Generic drug:  blood glucose monitoring     200 strip    USE TO TEST TWICE DAILY OR AS DIRECTED    Type 2 diabetes mellitus (H)       OPTIFOAM 6\"X6\" Pads     1 each    1 Box once a week    Ulcer of right leg, with fat layer exposed (H)       order for DME     2 each    Please measure and distribute 1 pair of 20mm Hg - 30mm Hg knee high ULCER " CARE open or closed toe compression stockings.  Patient has a size 13 foot and please take this into consideration.  Jobst or equivalent    Varicose veins of lower extremities with other complications, Venous stasis ulcer of right lower extremity (H)       order for DME     3 each    Please measure and distribute 1 pair of 20mmHg - 30mmHg knee high open or closed toe compression stockings. Jobst ultrasheer or equivalent.    Varicose veins of both lower extremities with complications       order for DME     2 each    Please measure and distribute 1 pair of 30mmHg - 40mmHg knee high open toe ulcercare compression stockings. Jobst ultrasheer or equivalent.    Varicose veins of bilateral lower extremities with other complications       sildenafil 50 MG tablet    VIAGRA    10 tablet    Take 1 tablet (50 mg) by mouth daily as needed for erectile dysfunction    Vasculogenic erectile dysfunction, unspecified vasculogenic erectile dysfunction type       silver sulfADIAZINE 1 % cream    SILVADENE    85 g    Apply topically daily To affected areas on right foot and leg.    Ulcer of right lower leg, with fat layer exposed (H), Chronic venous hypertension with ulcer involving right side (H), Type 2 diabetes, controlled, with neuropathy (H)       simvastatin 10 MG tablet    ZOCOR    90 tablet    Take 1 tablet (10 mg) by mouth At Bedtime    Type 2 diabetes, controlled, with neuropathy (H)       triamcinolone 0.1 % cream    KENALOG    30 g    Apply sparingly to left heel daily.    Dermatitis of left foot       VITAMIN C PO      Take 1,000 mg by mouth        VITAMIN D (CHOLECALCIFEROL) PO      Take 1,000 Units by mouth 2 times daily        * Notice:  This list has 4 medication(s) that are the same as other medications prescribed for you. Read the directions carefully, and ask your doctor or other care provider to review them with you.
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
## 2018-05-22 NOTE — NURSING NOTE
"Chief Complaint   Patient presents with     Follow Up For     3 month f/u post stent placement- dc       Initial There were no vitals taken for this visit. Estimated body mass index is 21.68 kg/(m^2) as calculated from the following:    Height as of 4/26/18: 1.88 m (6' 2.02\").    Weight as of 5/11/18: 76.6 kg (168 lb 15.4 oz)..  BP completed using cuff size: vannesa Farfan CMA    1:36 PM    "
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
## 2018-05-22 NOTE — NURSING NOTE
Med Reconcile: Reviewed and verified all current medications with the patient. The updated medication list was printed and given to the patient.    Return Appointment: Patient given instructions regarding scheduling next clinic visit, May 2019. Patient demonstrated understanding of this information and agreed to call with further questions or concerns.    Patient stated he understood all health information given and agreed to call with further questions or concerns.    Laura Pressley RN

## 2018-05-22 NOTE — PATIENT INSTRUCTIONS
Thank you for coming to the [facility] @ [facility]; please note the following instructions:    1. Dr. Bergeron Can would like you to return for a cardiac follow up in 1 year  (May 2019).  We will contact you regarding your appointment when the time draws closer or you may call [phone] to arrange an appointment.  Mean while, if you should have any questions or concerns regarding your heart health, please contact us.  Thank you for choosing [facility] for your care.          If you have any questions regarding your visit please contact your care team:     Cardiology  Telephone Number   Kathleen WHITING, RN  Laura CORONA, RN   Candy REBOLLEDO, SHANNEN L[address] MA   [phone]    *After hours: [phone]   For scheduling appts:     [phone] or    [phone] (select option [phone])    *After hours: [phone]     For the Device Clinic (Pacemakers and ICD's)  RN's :  Ciara Payne   During business hours: [phone]    *After business hours:  [phone] (select option 4)      Normal test result notifications will be released via Swopboard or mailed within 7 business days.  All other test results, will be communicated via telephone once reviewed by your cardiologist.    If you need a medication refill please contact your pharmacy.  Please allow 3 business days for your refill to be completed.    As always, thank you for trusting us with your health care needs!

## 2018-05-25 ENCOUNTER — HOSPITAL ENCOUNTER (OUTPATIENT)
Dept: CARDIAC REHAB | Facility: CLINIC | Age: 73
End: 2018-05-25
Attending: INTERNAL MEDICINE
Payer: MEDICARE

## 2018-05-25 PROCEDURE — 93798 PHYS/QHP OP CAR RHAB W/ECG: CPT

## 2018-05-25 PROCEDURE — 40000116 ZZH STATISTIC OP CR VISIT

## 2018-05-25 ASSESSMENT — 6 MINUTE WALK TEST (6MWT)
PREDICTED: 2070.96
GENDER SELECTION: MALE
FEMALE CALC: 1584.79
TOTAL DISTANCE WALKED (FT): 1156
MALE CALC: 2058.41

## 2018-05-29 ENCOUNTER — HOSPITAL ENCOUNTER (OUTPATIENT)
Dept: CARDIAC REHAB | Facility: CLINIC | Age: 73
End: 2018-05-29
Attending: INTERNAL MEDICINE
Payer: MEDICARE

## 2018-05-29 ENCOUNTER — OFFICE VISIT (OUTPATIENT)
Dept: PODIATRY | Facility: CLINIC | Age: 73
End: 2018-05-29
Payer: MEDICARE

## 2018-05-29 VITALS — HEIGHT: 74 IN | BODY MASS INDEX: 21.69 KG/M2 | WEIGHT: 169 LBS

## 2018-05-29 VITALS — DIASTOLIC BLOOD PRESSURE: 58 MMHG | OXYGEN SATURATION: 98 % | HEART RATE: 87 BPM | SYSTOLIC BLOOD PRESSURE: 118 MMHG

## 2018-05-29 DIAGNOSIS — E11.49 TYPE II OR UNSPECIFIED TYPE DIABETES MELLITUS WITH NEUROLOGICAL MANIFESTATIONS, NOT STATED AS UNCONTROLLED(250.60) (H): ICD-10-CM

## 2018-05-29 DIAGNOSIS — L08.9 TOE INFECTION: ICD-10-CM

## 2018-05-29 DIAGNOSIS — L97.912 ULCER OF RIGHT LOWER EXTREMITY WITH FAT LAYER EXPOSED (H): ICD-10-CM

## 2018-05-29 DIAGNOSIS — L97.521 SKIN ULCER OF LEFT FOOT, LIMITED TO BREAKDOWN OF SKIN (H): ICD-10-CM

## 2018-05-29 DIAGNOSIS — E11.51 DIABETES MELLITUS WITH PERIPHERAL VASCULAR DISEASE (H): ICD-10-CM

## 2018-05-29 DIAGNOSIS — L97.512 SKIN ULCER OF RIGHT FOOT WITH FAT LAYER EXPOSED (H): Primary | ICD-10-CM

## 2018-05-29 PROCEDURE — 99213 OFFICE O/P EST LOW 20 MIN: CPT | Performed by: PODIATRIST

## 2018-05-29 PROCEDURE — 40000116 ZZH STATISTIC OP CR VISIT: Performed by: REHABILITATION PRACTITIONER

## 2018-05-29 PROCEDURE — 93798 PHYS/QHP OP CAR RHAB W/ECG: CPT | Performed by: REHABILITATION PRACTITIONER

## 2018-05-29 RX ORDER — CEFADROXIL 500 MG/1
500 CAPSULE ORAL 2 TIMES DAILY
Qty: 28 CAPSULE | Refills: 0 | Status: SHIPPED | OUTPATIENT
Start: 2018-05-29 | End: 2018-07-10

## 2018-05-29 ASSESSMENT — PAIN SCALES - GENERAL: PAINLEVEL: NO PAIN (0)

## 2018-05-29 NOTE — NURSING NOTE
Amos Walker's chief complaint for this visit includes:  Chief Complaint   Patient presents with     RECHECK     Right leg ulcer     PCP: Racheal Swift    Referring Provider:  No referring provider defined for this encounter.    /58  Pulse 87  SpO2 98%  No Pain (0)     Do you need any medication refills at today's visit?

## 2018-05-29 NOTE — PROGRESS NOTES
Physician cosignature/electronic signature indicates approval of this ITP document. I have established, reviewed and made necessary changes to the individualized treatment plan and exercise prescription for this patient.     05/25/18 1400   Session   Session 90 Day Individualized Treatment Plan   Certified through this date 06/27/18   Cardiac Rehab Assessment   Cardiac Rehab Assessment Amos Walker is a 72 year old gentleman with a history of PAD, and CAD presenting with two vessel CAD, and underwent coronary angiogram on 3/1 which demonstrated two vessel disease, however, d/t contrast load already given, and needed to wait until 3/8 for successful percutaneous coronary intervention of the left main, mid and proximal LAD, proximal and mid RCA. 3/29 Patient feels he is beginning to have more stamina walking. He has been relying on his cane less, but carrying with if he needs to use it. Currently at 2.7 METs and has attended 5 sessions. Skilled therapy is recommended to monitor CV resonse to exercise, to provide education on exercise principles and risk factors of smoking and support to acheive goals. 4/26 Pt is no longer using can or bringing it to rehab. He has attended 14 rehab sessions and feels he continues to gain strength in his legs. He is still smoking 0.5 ppd, but states he enjoys it less. Continued skilled therapy is recommended to monitor CV response to exercise, and to continue to provide education on introducing home exercise.  5/25 Pt would like to continue with rehab sessions as he feels he has been benefiting greatly. Continue to progress as tolerated. Skilled therapy beneficial to continue progressing pt with aerobic exercise tolerance, assist in establishing independent exercise routine with stable cv response at current rehab level, continue educating on risk factor management.    General Information   Treatment Diagnosis Stent   Date of Treatment Diagnosis 03/08/18   Significant Past CV History  "PAD   Comorbidities PAD;DM   Other Medical History [facility]   Hospital Location [facility]   Hospital Discharge Date 03/08/18   Signs and Symptoms Post Hospital Discharge Fatigue   Outpatient Cardiac Rehab Start Date 03/15/18   Primary Physician Racheal Swift   Primary Physician Follow Up Completed   Surgeon Ghanshyam Moore MD   Cardiologist Dr. Chinchilla   Ejection Fraction 51%   Risk Stratification Low   Summary of Cath Report   Summary of Cath Report Available   Date Performed 03/01/18   Left Main 50-60%   LAD a long 50-60% proximal LAD stenosis, long 60% mLAD stenosis    LCX Prox 25-50%, mLCX 25-50, pLCX 50%   RCA pRCA 50-60% & mRCA 70%   Cath Report Comments Two vessel coronary artery disease with left main involvement.   Living and Work Status    Living Arrangements and Social Status [employer]   Support System Live with an adult   Return to Employment Retired   Occupation    Preventative Medications   CMS recommended medications Ace inhibitors;Antiplatelets;Lipid Lowering;Influenza vaccination;Pneumonia vaccination   Fall Risk Screen   Fall screen completed by Cardiac Rehab   Have you fallen 2 or more times in the past year? Yes   Have you fallen and had an injury in the past year? Yes  (Pt broke hip in 8/17)   Timed Up and Go score (seconds) 1st 13.45, 2nd 11.88   Fall screen comments Pt was seen my physical therapy in the fall of 2017   Pain   Patient Currently in Pain No   Physical Assessments   Incisions WNL   Edema None   Right Lung Sounds not assessed   Left Lung Sounds not assessed   Limitations Orthopedic   Comments Pt had hip surgery in 8/217   Individualized Treatment Plan   Monitored Sessions Scheduled 24   Monitored Sessions Attended 24   Oxygen   Supplemental Oxygen needed No   Nutrition Management - Weight Management   Assessment Re-assessment   Age 72   Weight 76.7 kg (169 lb)   Height 1.88 m (6' 2.02\")   BMI (Calculated) 21.73   Initial Rate Your Plate " Score. Dietary tool to assess eating patterns. Scores range from 24 to 72. The higher the score the healthier the eating pattern. 64   Weight Management Comments 4/26 Pt trying to gain wt lost after hip surgery. He states dr may reduce BP medication until he gains more weight.5/25 pt notes that he is comfortable with his current weight.    Nutrition Management - Lipids   Lipids Labs Available   Date 10/25/17   Total Cholesterol 92   Triglycerides 100   HDL 41   LDL 30   Prescribed Lipid Medication Yes   Statin Intensity Low Intensity   Nutrition Management - Diabetes   Diabetes Type II   Do you Monitor BS at Home? Other (see comments)  (Pt has started to check check BS more regular)   Diabetes Medication Prescribed Yes, Insulin;Yes, Oral Medication   Hb A1C Date: 10/25/17   Hb A1C Result: 6.5   Nutrition Management Summary   Dietary Recommendations Low Fat;Low Cholesterol;Low Sodium   Stages of Change for Diet Compliance Preparation   Interventions Planned Attend Nutrition Education Class(es)   Psychosocial Management   Psychosocial Assessment Re-assessment   Is there history of clinical depression or increased risk of depression? No previous history   Current Level of Stress per Patient Report Moderate    Current Coping Skills Other (see comments)  (Prayer and smoking)   Initial Patient Health Questionnaire -9 Score (PHQ-9) for depression. 5-9 Minimal symptoms, 10-14 Minor depression, 15-19 Major depression, moderately severe, > 20 Major depression, severe  3   Initial [facility] Survey score.  Quality of Life:   If total score > 25 review individual areas where patient rated a 4 or 5.  Consider patients current medical condition and what role that plays on the score.   Adjust treatment protocol to improve areas of concern.  Consider the following:  PHQ9 score, DASI, and re-assessment within the next 30 days to assist with developing treatments.  21   Stages of Change Preparation   Interventions Planned Patient  denies need for intervention at this time.   Other Core Components - Hypertension   History of or Diagnosis of Hypertension Yes   Currently taking Anti-Hypertensives Yes;Ace Inhibitor   Other Core Components - Tobacco   History of Tobacco Use Yes   Tobacco Use Status Currently smoking - everyday   Tobacco Habit Cigarettes   Tobacco Use per Day (average) (<0.5ppd)   Years of Tobacco Use 55   Stages of Change Contemplation   Tobacco Comments 4/26 Pt not ready to set quit date, but notes he is smoking 10 cigs a day and enjoys it less.5/25 Pt requests not do discuss smoking habits   Other Core Components Summary   Interventions Planned Educate on benefits of smoking cessation   Other Core Components Comments 5/25 pt in precontemplation for smoking cessation. Denies conversation about current habits.    Activity/Exercise History   Activity/Exercise Assessment Re-assessment   Activity/Exercise Status prior to event? Sedentary   Number of Days Currently participating in Moderate Physical Activity? 5   Number of Days Currently performing  Aerobic Exercise (including rehab)? 3   Number of Minutes per Session Currently of Aerobic Exercise (average)? 2   Current Stage of Change (Physical Activity) Action   Current Stage of Change (Aerobic Exercise) Action   Patient Goals Goal #1;Goal #2   Goal #1 Description Pt will increae his strength and endurance to be able to walk 10 minutes without a cane   Goal #1 Target Date 04/29/18   Goal #1 Date Met 04/26/18   Goal #1 Progress Towards Goal 4/26 Pt is walking to/from rehab without can. He is no longer bringing it. He notes he is picking up his feel better and not dragging his feet as much.    Goal #2 Description Pt will have the confidence to be able to resume walking 40 minutes 3-5x per week.   Goal #2 Target Date 05/14/18   Goal #2 Date Met 05/25/18   Goal #2 Progress Towards Goal 4/26 Pt still working on confidence in walking 40 min. He is still walking 20 min outside of rehab 2  days/week. Pt also plans to start using 5 pound weights at home 1 extra day a week. 5/25 Pt notes comfort walking 40 minutes 3x/day with his cane. Reports feeling more confident with cane and increased speed of walking.    Activity/Exercise Comments Pt was walking 40 minutes a day 4-5x a week up until he broke his hip in August   Exercise Assessment   6 Minute Walk Predicted - Gender Selection Male   6 Minute Walk Predicted (Male) 2058.41   6 Minute Walk Predicted (Female) 1584.79   Initial 6 Minute Walk Distance (Feet) 1156 ft   Resting HR 83 bpm   Exercise  bpm   Post Exercise HR 86 bpm   Resting /44   Exercise /50   Post Exercise BP 84/42   Pre  mg/dL   Post  mg/dL   Effort Rating 6   Current MET Level 5.4   MET Level Goal 6 METs   ECG Rhythm Normal sinus rhythm   Ectopy None   Current Symptoms Denies symptoms   Limitations/Restrictions None   Exercise Prescription   Mode Treadmill;Airdyne;Nustep;Weights   Duration/Time 15-30 min   Frequency 3 daysweek   THR (85% of age predicted max HR) 127.5   OMNI Effort Rating (0-10 Scale) 4-6/10   Progression Continuous bouts;Total exercise time of 20-30 minutes;Aerobic exercise to OMNI rating of 5-7, and heart rate at or below target;Progress peak intensity by 1/2 MET per week   Recommended Home Exercise   Type of Exercise Walking   Frequency (days per week) daily   Duration (minutes per session) 15-30 min   Effort Rating Recommended 4-6/10   Current Home Exercise   Type of Exercise Walking   Frequency (days per week) 2   Duration (minutes per session) 40   Follow-up/On-going Support   Provider follow-up needed on the following No follow-up needed   Learning Assessment   Learner Patient   Primary Language English   Preferred Learning Style Reading;Pictures/Video   Barriers to Learning Hearing   Patient Education   Education recommended Anatomy and Physiology of the Heart;Exercise Principles;Medication Overview;Nutrition;Risk Factors;Smoking  Cessation   Education Comments 4/26 Gave pt schedule and class description, encouraged to come to classes of interest. 5/25 pt denied want to attend education classes.
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
## 2018-05-29 NOTE — ADDENDUM NOTE
Encounter addended by: Lane Suggs on: 5/29/2018 12:19 PM<BR>     Actions taken: Flowsheet data copied forward, Sign clinical note, Flowsheet accepted

## 2018-05-29 NOTE — MR AVS SNAPSHOT
After Visit Summary   2018    Amos Walker    MRN: [id number]           Patient Information     Date Of Birth          1945        Visit Information        Provider Department      2018 8:30 AM Brayan Mcclain DPM [facility]        Today's Diagnoses     Skin ulcer of right foot with fat layer exposed (H)    -  1    Ulcer of right lower extremity with fat layer exposed (H)        Type II or unspecified type diabetes mellitus with neurological manifestations, not stated as uncontrolled(250.60) (H)        Diabetes mellitus with peripheral vascular disease (H)        Toe infection        Skin ulcer of left foot, limited to breakdown of skin (H)          Care Instructions    Thanks for coming today.  [facility]  [address] [place] [address]    To schedule future appointments in Ortho Clinic, you may call [phone].    To schedule ordered imaging by your provider:   Call Central Imaging [phone]    To schedule an injection ordered by your provider:  Call Central Imaging Injection scheduling line: [phone]  SavvySystems available online at:  [email or web address]    Please call if any further questions or concerns ([phone]).  Clinic hours 8 am to 5 pm.    Return to clinic (call) if symptoms worsen or fail to improve.            Follow-ups after your visit        Your next 10 appointments already scheduled     May 29, 2018  4:30 PM CDT   Cardiac Treatment with Ur Cardiac Rehab 1   [facility], Cardiac Rehabilitation ([facility])    [address] [place] [address] 1st Floor [address]  [facility] [address]   [phone]            May 30, 2018  3:00 PM CDT   Cardiac Treatment with Ur Cardiac Rehab 1   [facility], Cardiac Rehabilitation ([facility])    [address] [place] [address] [address]st [address]  [place] [address]   [phone]            Jun 01, 2018  3:00 PM CDT   Cardiac Treatment with  Cardiac Rehab 1   [facility] [place], Cardiac Rehabilitation ([facility])    [address] 1st [address]  [facility] [address]   [phone]            Jun 05, 2018  8:00 AM CDT   Return Visit with Brayan Mcclain DPM   [facility] ([facility])    [address] [facility] [address]   [phone]            Jun 12, 2018  2:00 PM CDT   Return Visit with Brayan Mcclain DPM   [facility] ([facility])    [address] [facility] [address] [phone]            Jun 19, 2018  2:00 PM CDT   Return Visit with Brayan Mcclain DPM   [facility] ([facility])    [address] [facility] [address] [phone]            Jun 20, 2018 10:30 AM CDT   RETURN RETINA with Jen Aranda MD   [facility] ([facility])    [address]  9th Fl Clin 9a  [facility] [address]   [phone]            Jun 29, 2018  8:55 AM CDT   (Arrive by 8:40 AM)   Return Visit with Racheal Swift MD   [facility] ([facility])    [address] [facility]  4th Floor  [facility] [address]   [phone]            Jul 10, 2018  1:45 PM CDT   Return Visit with Brayan Mcclain DPM   [facility] ([facility])    [address] [facility] [address] [phone]            Jul 17, 2018  9:30 AM CDT   Return Visit with Brayan Mcclain DPM   [facility] ([facility])    [address] [facility] [address] [phone]              Who to contact     If you have questions or need follow up information about today's clinic visit or your schedule please  contact [facility] directly at [phone].  Normal or non-critical lab and imaging results will be communicated to you by Logoprohart, letter or phone within 4 business days after the clinic has received the results. If you do not hear from us within 7 days, please contact the clinic through Logoprohart or phone. If you have a critical or abnormal lab result, we will notify you by phone as soon as possible.  Submit refill requests through Nerdies or call your pharmacy and they will forward the refill request to us. Please allow 3 business days for your refill to be completed.          Additional Information About Your Visit        Nerdies Information     Nerdies gives you secure access to your electronic health record. If you see a primary care provider, you can also send messages to your care team and make appointments. If you have questions, please call your primary care clinic.  If you do not have a primary care provider, please call [phone] and they will assist you.      Nerdies is an electronic gateway that provides easy, online access to your medical records. With Nerdies, you can request a clinic appointment, read your test results, renew a prescription or communicate with your care team.     To access your existing account, please contact your [facility] or call [phone] for assistance.        Care EveryWhere ID     This is your Care EveryWhere ID. This could be used by other organizations to access your [place] medical records  [id number]        Your Vitals Were     Pulse Pulse Oximetry                87 98%           Blood Pressure from Last 3 Encounters:   05/29/18 118/58   05/22/18 131/74   05/15/18 138/66    Weight from Last 3 Encounters:   05/22/18 77.6 kg (171 lb)   05/11/18 76.6 kg (168 lb 15.4 oz)   04/27/18 77.1 kg (170 lb)              Today, you had the following     No orders found for display         Today's Medication Changes           These changes are accurate as of 5/29/18 11:35 AM.  If you have any questions, ask your nurse or doctor.               These medicines have changed or have updated prescriptions.        Dose/Directions    * clopidogrel 75 MG tablet   Commonly known as:  PLAVIX   This may have changed:  when to take this   Used for:  Peripheral vascular disease, unspecified        Dose:  75 mg   Take 1 tablet (75 mg) by mouth daily   Quantity:  30 tablet   Refills:  11       * clopidogrel 75 MG tablet   Commonly known as:  PLAVIX   This may have changed:  Another medication with the same name was changed. Make sure you understand how and when to take each.   Used for:  Peripheral vascular disease (H)        Dose:  75 mg   Take 1 tablet (75 mg) by mouth daily   Quantity:  30 tablet   Refills:  3       gentamicin 0.1 % cream   Commonly known as:  GARAMYCIN   This may have changed:    - when to take this  - reasons to take this  - additional instructions   Used for:  Ulcer of right lower leg, with fat layer exposed (H), Chronic venous hypertension with ulcer involving right side (H), Type 2 diabetes, controlled, with neuropathy (H)        Apply topically daily To right leg ulcer.   Quantity:  30 g   Refills:  5       * Notice:  This list has 2 medication(s) that are the same as other medications prescribed for you. Read the directions carefully, and ask your doctor or other care provider to review them with you.         Where to get your medicines      These medications were sent to "[facility] [address] [place] [address]  [facility], [facility] [address]     Phone:  [phone]     cefadroxil 500 MG capsule                Primary Care Provider Office Phone # Fax #    Racheal Swift [phone]       [address] [address]        Equal Access to Services     SAMSON MELTON AH: miguel Pineda qaybta  yolanda trevino donovankristie cabellodamion duran ah. [facility] [phone].    ATENCIÓN: Si pancho wagner, tiene a rodrigues disposición servicios gratuitos de asistencia lingüística. Mary al [phone].    We comply with applicable federal civil rights laws and [place] laws. We do not discriminate on the basis of race, color, national origin, age, disability, sex, sexual orientation, or gender identity.            Thank you!     Thank you for choosing [facility]  for your care. Our goal is always to provide you with excellent care. Hearing back from our patients is one way we can continue to improve our services. Please take a few minutes to complete the written survey that you may receive in the mail after your visit with us. Thank you!             Your Updated Medication List - Protect others around you: Learn how to safely use, store and throw away your medicines at www.disposemymeds.org.          This list is accurate as of 5/29/18 11:35 AM.  Always use your most recent med list.                   Brand Name Dispense Instructions for use Diagnosis    ammonium lactate 12 % cream    LAC-HYDRIN    385 g    Apply topically 2 times daily as needed for dry skin    Venous stasis, Type 2 diabetes, controlled, with neuropathy (H)       ascorbic acid 500 MG Tabs     30 tablet    Take 1 tablet (500 mg) by mouth 2 times daily    Ulcer of right lower leg, with fat layer exposed (H)       ASPIRIN PO      Take 81 mg by mouth daily        blood glucose monitoring lancets     3 Box    Use to test blood sugars 2 as directed.    Type 2 diabetes, uncontrolled, with neuropathy (H)       Blood Pressure Kit     1 kit    1 Device daily    Benign essential hypertension       cefadroxil 500 MG capsule    DURICEF    28 capsule    Take 1 capsule (500 mg) by mouth 2 times daily    Skin ulcer of left foot, limited to breakdown of skin (H), Type II or unspecified type diabetes mellitus with neurological  "manifestations, not stated as uncontrolled(250.60) (H), Diabetes mellitus with peripheral vascular disease (H)       * clopidogrel 75 MG tablet    PLAVIX    30 tablet    Take 1 tablet (75 mg) by mouth daily    Peripheral vascular disease, unspecified       * clopidogrel 75 MG tablet    PLAVIX    30 tablet    Take 1 tablet (75 mg) by mouth daily    Peripheral vascular disease (H)       ferrous sulfate 325 (65 Fe) MG tablet    IRON    60 tablet    Take 1 tablet (325 mg) by mouth 2 times daily    Peripheral vascular disease, unspecified       gentamicin 0.1 % cream    GARAMYCIN    30 g    Apply topically daily To right leg ulcer.    Ulcer of right lower leg, with fat layer exposed (H), Chronic venous hypertension with ulcer involving right side (H), Type 2 diabetes, controlled, with neuropathy (H)       insulin pen needle 31G X 8 MM    B-D U/F    100 each    Use 1 daily o as directed    Diabetes mellitus, type II (H)       LANTUS SOLOSTAR 100 UNIT/ML injection   Generic drug:  insulin glargine     3 mL    Inject 18 Units Subcutaneous daily (with dinner) May take up to 25 units daily    Type 2 diabetes mellitus with diabetic peripheral angiopathy without gangrene, with long-term current use of insulin (H)       LISINOPRIL PO      Take 20 mg by mouth 2 times daily        * nateglinide 120 MG tablet    STARLIX    90 tablet    TAKE 1 TABLET BY MOUTH THREE TIMES DAILY BEFORE MEALS    Type 2 diabetes, controlled, with neuropathy (H)       * nateglinide 120 MG tablet    STARLIX    90 tablet    Take 1 tablet (120 mg) by mouth 3 times daily (before meals)    Diabetes mellitus (H)       ONETOUCH ULTRA test strip   Generic drug:  blood glucose monitoring     200 strip    USE TO TEST TWICE DAILY OR AS DIRECTED    Type 2 diabetes mellitus (H)       OPTIFOAM 6\"X6\" Pads     1 each    1 Box once a week    Ulcer of right leg, with fat layer exposed (H)       order for DME     2 each    Please measure and distribute 1 pair of 20mm Hg - " 30mm Hg knee high ULCER CARE open or closed toe compression stockings.  Patient has a size 13 foot and please take this into consideration.  Jobst or equivalent    Varicose veins of lower extremities with other complications, Venous stasis ulcer of right lower extremity (H)       order for DME     3 each    Please measure and distribute 1 pair of 20mmHg - 30mmHg knee high open or closed toe compression stockings. Jobst ultrasheer or equivalent.    Varicose veins of both lower extremities with complications       order for DME     2 each    Please measure and distribute 1 pair of 30mmHg - 40mmHg knee high open toe ulcercare compression stockings. Jobst ultrasheer or equivalent.    Varicose veins of bilateral lower extremities with other complications       sildenafil 50 MG tablet    VIAGRA    10 tablet    Take 1 tablet (50 mg) by mouth daily as needed for erectile dysfunction    Vasculogenic erectile dysfunction, unspecified vasculogenic erectile dysfunction type       silver sulfADIAZINE 1 % cream    SILVADENE    85 g    Apply topically daily To affected areas on right foot and leg.    Ulcer of right lower leg, with fat layer exposed (H), Chronic venous hypertension with ulcer involving right side (H), Type 2 diabetes, controlled, with neuropathy (H)       simvastatin 10 MG tablet    ZOCOR    90 tablet    Take 1 tablet (10 mg) by mouth At Bedtime    Type 2 diabetes, controlled, with neuropathy (H)       triamcinolone 0.1 % cream    KENALOG    30 g    Apply sparingly to left heel daily.    Dermatitis of left foot       VITAMIN C PO      Take 1,000 mg by mouth        VITAMIN D (CHOLECALCIFEROL) PO      Take 1,000 Units by mouth 2 times daily        * Notice:  This list has 4 medication(s) that are the same as other medications prescribed for you. Read the directions carefully, and ask your doctor or other care provider to review them with you.

## 2018-05-29 NOTE — PATIENT INSTRUCTIONS
Thanks for coming today.  [facility] Clinic  [address] [place] [address]    To schedule future appointments in Ortho Clinic, you may call [phone].    To schedule ordered imaging by your provider:   Call Central Imaging [phone]    To schedule an injection ordered by your provider:  Call Central Imaging Injection scheduling line: [phone]  Format Dynamicshart available online at:  [email or web address]    Please call if any further questions or concerns ([phone]).  Clinic hours 8 am to 5 pm.    Return to clinic (call) if symptoms worsen or fail to improve.
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
## 2018-05-30 ENCOUNTER — HOSPITAL ENCOUNTER (OUTPATIENT)
Dept: CARDIAC REHAB | Facility: CLINIC | Age: 73
End: 2018-05-30
Attending: INTERNAL MEDICINE
Payer: MEDICARE

## 2018-05-30 PROCEDURE — 40000116 ZZH STATISTIC OP CR VISIT: Performed by: REHABILITATION PRACTITIONER

## 2018-05-30 PROCEDURE — 93798 PHYS/QHP OP CAR RHAB W/ECG: CPT | Performed by: REHABILITATION PRACTITIONER

## 2018-06-05 ENCOUNTER — OFFICE VISIT (OUTPATIENT)
Dept: PODIATRY | Facility: CLINIC | Age: 73
End: 2018-06-05
Payer: MEDICARE

## 2018-06-05 VITALS — OXYGEN SATURATION: 98 % | DIASTOLIC BLOOD PRESSURE: 56 MMHG | HEART RATE: 75 BPM | SYSTOLIC BLOOD PRESSURE: 118 MMHG

## 2018-06-05 DIAGNOSIS — I87.8 VENOUS STASIS: ICD-10-CM

## 2018-06-05 DIAGNOSIS — L97.912 ULCER OF RIGHT LOWER EXTREMITY WITH FAT LAYER EXPOSED (H): Primary | ICD-10-CM

## 2018-06-05 PROCEDURE — 99213 OFFICE O/P EST LOW 20 MIN: CPT | Performed by: PODIATRIST

## 2018-06-05 ASSESSMENT — PAIN SCALES - GENERAL: PAINLEVEL: NO PAIN (0)

## 2018-06-05 NOTE — NURSING NOTE
Amos Walker's chief complaint for this visit includes:  Chief Complaint   Patient presents with     RECHECK     Right leg ulcer     PCP: Racheal Swift    Referring Provider:  No referring provider defined for this encounter.    /56  Pulse 75  SpO2 98%  No Pain (0)     Do you need any medication refills at today's visit?
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
## 2018-06-05 NOTE — MR AVS SNAPSHOT
After Visit Summary   2018    Amos Walker    MRN: [id number]           Patient Information     Date Of Birth          1945        Visit Information        Provider Department      2018 8:00 AM Brayan Mcclain DPM [facility]        Today's Diagnoses     Ulcer of right lower extremity with fat layer exposed (H)    -  1    Venous stasis          Care Instructions    Thanks for coming today.  [facility]  [address] [address] [place] [address]    To schedule future appointments in Ortho Clinic, you may call [phone].    To schedule ordered imaging by your provider:   Call Central Imaging [phone]    To schedule an injection ordered by your provider:  Call Central Imaging Injection scheduling line: [phone]  HAL[employer] available online at:  [email or web address]    Please call if any further questions or concerns ([phone]).  Clinic hours 8 am to 5 pm.    Return to clinic (call) if symptoms worsen or fail to improve.            Follow-ups after your visit        Your next 10 appointments already scheduled     2018  1:00 PM CDT   Cardiac Treatment with Ur Cardiac Rehab 1   [facility], Cardiac Rehabilitation ([facility])    [address] [place] [address] [address]st Floor [address] [place] [address] [phone]            2018  2:00 PM CDT   CONSULT with Ur Cardiac Rehab 1   [facility], Cardiac Rehabilitation ([facility])    [address] [place] [address] [address]st Floor [address] [address] [phone]            2018  2:00 PM CDT   Return Visit with Brayan Mcclain DPM   [facility] ([facility])    [address] [place] [id number] [phone]            José 15, 2018  1:00 PM CDT   Cardiac Treatment with Ur Cardiac Rehab 1   [facility]  [place], Cardiac Rehabilitation ([facility])    [facility] [address] 1st Floor [address]  [facility] [address]   [phone]            Jun 19, 2018  2:00 PM CDT   Return Visit with Brayan Mcclain DPM   [facility]    [address] [place] [id number] [phone]            Jun 20, 2018 10:30 AM CDT   RETURN RETINA with Jen Aranda MD   [facility])    [address]  [address]  [facility] [address]   [phone]            Jun 22, 2018  1:00 PM CDT   Cardiac Treatment with Ur Cardiac Rehab 1   [facility] [place], Cardiac Rehabilitation ([facility])    [facility] [address] 1st Floor [address]  [facility] [address]   [phone]            Jun 29, 2018  8:55 AM CDT   (Arrive by 8:40 AM)   Return Visit with Racheal Swift MD   [facility] ([facility])    [address] [facility]  4th Floor  [facility] [address] [phone]            Jul 10, 2018  1:45 PM CDT   Return Visit with Brayan Mcclain DPM   [facility] ([facility])    [address] [place] [id number] [phone]            Jul 17, 2018  9:30 AM CDT   Return Visit with Brayan Mcclain DPM   [facility] ([facility])    [address] [place] [id number] [phone]              Who to contact     If you have questions or need follow up information about today's clinic visit or your schedule please contact [facility] directly at [phone].  Normal or non-critical lab and imaging results will be communicated to you by MyChart, letter or phone within 4 business days after the clinic has received the results. If you do not  hear from us within 7 days, please contact the clinic through [employer] or phone. If you have a critical or abnormal lab result, we will notify you by phone as soon as possible.  Submit refill requests through [employer] or call your pharmacy and they will forward the refill request to us. Please allow 3 business days for your refill to be completed.          Additional Information About Your Visit        [place] [employer] Information     [employer] gives you secure access to your electronic health record. If you see a primary care provider, you can also send messages to your care team and make appointments. If you have questions, please call your primary care clinic.  If you do not have a primary care provider, please call [phone] and they will assist you.      [employer] is an electronic gateway that provides easy, online access to your medical records. With [employer], you can request a clinic appointment, read your test results, renew a prescription or communicate with your care team.     To access your existing account, please contact your [facility] or call [phone] for assistance.        Care EveryWhere ID     This is your Care EveryWhere ID. This could be used by other organizations to access your [place] medical records  [id number]        Your Vitals Were     Pulse Pulse Oximetry                75 98%           Blood Pressure from Last 3 Encounters:   06/05/18 118/56   05/29/18 118/58   05/22/18 131/74    Weight from Last 3 Encounters:   05/25/18 76.7 kg (169 lb)   05/22/18 77.6 kg (171 lb)   05/11/18 76.6 kg (168 lb 15.4 oz)              Today, you had the following     No orders found for display         Today's Medication Changes          These changes are accurate as of 6/5/18  8:29 AM.  If you have any questions, ask your nurse or doctor.               These medicines have changed or have updated prescriptions.        Dose/Directions    * clopidogrel 75 MG tablet   Commonly known as:   PLAVIX   This may have changed:  when to take this   Used for:  Peripheral vascular disease, unspecified        Dose:  75 mg   Take 1 tablet (75 mg) by mouth daily   Quantity:  30 tablet   Refills:  11       * clopidogrel 75 MG tablet   Commonly known as:  PLAVIX   This may have changed:  Another medication with the same name was changed. Make sure you understand how and when to take each.   Used for:  Peripheral vascular disease (H)        Dose:  75 mg   Take 1 tablet (75 mg) by mouth daily   Quantity:  30 tablet   Refills:  3       gentamicin 0.1 % cream   Commonly known as:  GARAMYCIN   This may have changed:    - when to take this  - reasons to take this  - additional instructions   Used for:  Ulcer of right lower leg, with fat layer exposed (H), Chronic venous hypertension with ulcer involving right side (H), Type 2 diabetes, controlled, with neuropathy (H)        Apply topically daily To right leg ulcer.   Quantity:  30 g   Refills:  5       * Notice:  This list has 2 medication(s) that are the same as other medications prescribed for you. Read the directions carefully, and ask your doctor or other care provider to review them with you.             Primary Care Provider Office Phone # Fax #    Racheal Swift [phone]       [address] [address]        Equal Access to Services     SAMSON MELTON : Nataliia ferrell Soger, wajunitoda luroxanna, qaybta kaalmada adeegtamikada, yolanda lopez. [facility] [phone].    ATENCIÓN: Si habla español, tiene a rodrigues disposición servicios gratuitos de asistencia lingüística. Llame al [phone].    We comply with applicable federal civil rights laws and [place] laws. We do not discriminate on the basis of race, color, national origin, age, disability, sex, sexual orientation, or gender identity.            Thank you!     Thank you for choosing [facility]  for your care. Our goal is  always to provide you with excellent care. Hearing back from our patients is one way we can continue to improve our services. Please take a few minutes to complete the written survey that you may receive in the mail after your visit with us. Thank you!             Your Updated Medication List - Protect others around you: Learn how to safely use, store and throw away your medicines at www.disposemymeds.org.          This list is accurate as of 6/5/18  8:29 AM.  Always use your most recent med list.                   Brand Name Dispense Instructions for use Diagnosis    ammonium lactate 12 % cream    LAC-HYDRIN    385 g    Apply topically 2 times daily as needed for dry skin    Venous stasis, Type 2 diabetes, controlled, with neuropathy (H)       ascorbic acid 500 MG Tabs     30 tablet    Take 1 tablet (500 mg) by mouth 2 times daily    Ulcer of right lower leg, with fat layer exposed (H)       ASPIRIN PO      Take 81 mg by mouth daily        blood glucose monitoring lancets     3 Box    Use to test blood sugars 2 as directed.    Type 2 diabetes, uncontrolled, with neuropathy (H)       Blood Pressure Kit     1 kit    1 Device daily    Benign essential hypertension       cefadroxil 500 MG capsule    DURICEF    28 capsule    Take 1 capsule (500 mg) by mouth 2 times daily    Skin ulcer of left foot, limited to breakdown of skin (H), Type II or unspecified type diabetes mellitus with neurological manifestations, not stated as uncontrolled(250.60) (H), Diabetes mellitus with peripheral vascular disease (H)       * clopidogrel 75 MG tablet    PLAVIX    30 tablet    Take 1 tablet (75 mg) by mouth daily    Peripheral vascular disease, unspecified       * clopidogrel 75 MG tablet    PLAVIX    30 tablet    Take 1 tablet (75 mg) by mouth daily    Peripheral vascular disease (H)       ferrous sulfate 325 (65 Fe) MG tablet    IRON    60 tablet    Take 1 tablet (325 mg) by mouth 2 times daily    Peripheral vascular disease,  "unspecified       gentamicin 0.1 % cream    GARAMYCIN    30 g    Apply topically daily To right leg ulcer.    Ulcer of right lower leg, with fat layer exposed (H), Chronic venous hypertension with ulcer involving right side (H), Type 2 diabetes, controlled, with neuropathy (H)       insulin pen needle 31G X 8 MM    B-D U/F    100 each    Use 1 daily o as directed    Diabetes mellitus, type II (H)       LANTUS SOLOSTAR 100 UNIT/ML injection   Generic drug:  insulin glargine     3 mL    Inject 18 Units Subcutaneous daily (with dinner) May take up to 25 units daily    Type 2 diabetes mellitus with diabetic peripheral angiopathy without gangrene, with long-term current use of insulin (H)       LISINOPRIL PO      Take 20 mg by mouth 2 times daily        * nateglinide 120 MG tablet    STARLIX    90 tablet    TAKE 1 TABLET BY MOUTH THREE TIMES DAILY BEFORE MEALS    Type 2 diabetes, controlled, with neuropathy (H)       * nateglinide 120 MG tablet    STARLIX    90 tablet    Take 1 tablet (120 mg) by mouth 3 times daily (before meals)    Diabetes mellitus (H)       ONETOUCH ULTRA test strip   Generic drug:  blood glucose monitoring     200 strip    USE TO TEST TWICE DAILY OR AS DIRECTED    Type 2 diabetes mellitus (H)       OPTIFOAM 6\"X6\" Pads     1 each    1 Box once a week    Ulcer of right leg, with fat layer exposed (H)       order for DME     2 each    Please measure and distribute 1 pair of 20mm Hg - 30mm Hg knee high ULCER CARE open or closed toe compression stockings.  Patient has a size 13 foot and please take this into consideration.  Jobst or equivalent    Varicose veins of lower extremities with other complications, Venous stasis ulcer of right lower extremity (H)       order for DME     3 each    Please measure and distribute 1 pair of 20mmHg - 30mmHg knee high open or closed toe compression stockings. Jobst ultrasheer or equivalent.    Varicose veins of both lower extremities with complications       order for " DME     2 each    Please measure and distribute 1 pair of 30mmHg - 40mmHg knee high open toe ulcercare compression stockings. Jobst ultrasheer or equivalent.    Varicose veins of bilateral lower extremities with other complications       sildenafil 50 MG tablet    VIAGRA    10 tablet    Take 1 tablet (50 mg) by mouth daily as needed for erectile dysfunction    Vasculogenic erectile dysfunction, unspecified vasculogenic erectile dysfunction type       silver sulfADIAZINE 1 % cream    SILVADENE    85 g    Apply topically daily To affected areas on right foot and leg.    Ulcer of right lower leg, with fat layer exposed (H), Chronic venous hypertension with ulcer involving right side (H), Type 2 diabetes, controlled, with neuropathy (H)       simvastatin 10 MG tablet    ZOCOR    90 tablet    Take 1 tablet (10 mg) by mouth At Bedtime    Type 2 diabetes, controlled, with neuropathy (H)       triamcinolone 0.1 % cream    KENALOG    30 g    Apply sparingly to left heel daily.    Dermatitis of left foot       VITAMIN C PO      Take 1,000 mg by mouth        VITAMIN D (CHOLECALCIFEROL) PO      Take 1,000 Units by mouth 2 times daily        * Notice:  This list has 4 medication(s) that are the same as other medications prescribed for you. Read the directions carefully, and ask your doctor or other care provider to review them with you.

## 2018-06-05 NOTE — PATIENT INSTRUCTIONS
Thanks for coming today.  Ortho[facility] Medicine Clinic  [address] [place] [address]    To schedule future appointments in Ortho Clinic, you may call [phone].    To schedule ordered imaging by your provider:   Call Central Imaging [phone]    To schedule an injection ordered by your provider:  Call Central Imaging Injection scheduling line: [phone]  [employer] available online at:  [email or web address]    Please call if any further questions or concerns ([phone]).  Clinic hours 8 am to 5 pm.    Return to clinic (call) if symptoms worsen or fail to improve.
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
## 2018-06-08 ENCOUNTER — HOSPITAL ENCOUNTER (OUTPATIENT)
Dept: CARDIAC REHAB | Facility: CLINIC | Age: 73
End: 2018-06-08
Attending: INTERNAL MEDICINE
Payer: MEDICARE

## 2018-06-08 VITALS — WEIGHT: 169 LBS | HEIGHT: 74 IN | BODY MASS INDEX: 21.69 KG/M2

## 2018-06-08 PROCEDURE — 40000116 ZZH STATISTIC OP CR VISIT

## 2018-06-08 PROCEDURE — 93798 PHYS/QHP OP CAR RHAB W/ECG: CPT

## 2018-06-08 ASSESSMENT — 6 MINUTE WALK TEST (6MWT)
TOTAL DISTANCE WALKED (FT): 1156
GENDER SELECTION: MALE
FEMALE CALC: 1584.79
MALE CALC: 2058.41
PREDICTED: 2070.96

## 2018-06-08 NOTE — PROGRESS NOTES
06/08/18 1600   Session   Session Other (comments)  (1:1 Consult)   Certified through this date 07/07/18   Cardiac Rehab Assessment   Cardiac Rehab Assessment Amos Walker is a 72 year old gentleman with a history of PAD, and CAD presenting with two vessel CAD, and underwent coronary angiogram on 3/1 which demonstrated two vessel disease, however, d/t contrast load already given, and needed to wait until 3/8 for successful percutaneous coronary intervention of the left main, mid and proximal LAD, proximal and mid RCA. 3/29 Patient feels he is beginning to have more stamina walking. He has been relying on his cane less, but carrying with if he needs to use it. Currently at 2.7 METs and has attended 5 sessions. Skilled therapy is recommended to monitor CV resonse to exercise, to provide education on exercise principles and risk factors of smoking and support to acheive goals. 4/26 Pt is no longer using can or bringing it to rehab. He has attended 14 rehab sessions and feels he continues to gain strength in his legs. He is still smoking 0.5 ppd, but states he enjoys it less. .  5/25 Pt would like to continue with rehab sessions as he feels he has been benefiting greatly. Continue to progress as tolerated. 6/8 Pt notes that he has initiated independent exercise routine at home 5x/week. Continues to walk without cane and notes improved confidence in his ability. Pt has desire to continue to progress with home exercise confidence and intensity. Continued skilled therapy is recommended to monitor CV response to exercise with continued progression of exercise intensity, and to continue to provide education on home exercise recommendations and intensity education.    General Information   Treatment Diagnosis Stent   Date of Treatment Diagnosis 03/08/18   Significant Past CV History PAD   Comorbidities PAD;DM   Other Medical History .[facility]   Hospital [facility]   Hospital Discharge Date  "03/08/18   Signs and Symptoms Post Hospital Discharge Fatigue   Outpatient Cardiac Rehab Start Date 03/15/18   Primary Physician Racheal Swift   Primary Physician Follow Up Completed   Surgeon Ghanshyam Moore MD   Cardiologist Dr. Chinchilla   Ejection Fraction 51%   Risk Stratification Low   Summary of Cath Report   Summary of Cath Report Available   Date Performed 03/01/18   Left Main 50-60%   LAD a long 50-60% proximal LAD stenosis, long 60% mLAD stenosis    LCX Prox 25-50%, mLCX 25-50, pLCX 50%   RCA pRCA 50-60% & mRCA 70%   Cath Report Comments Two vessel coronary artery disease with left main involvement.   Living and Work Status    Living Arrangements and Social Status [employer] Live with an adult   Return to Employment Retired   Occupation    Preventative Medications   CMS recommended medications Ace inhibitors;Antiplatelets;Lipid Lowering;Influenza vaccination;Pneumonia vaccination   Fall Risk Screen   Fall screen completed by Cardiac Rehab   Have you fallen 2 or more times in the past year? Yes   Have you fallen and had an injury in the past year? Yes  (Pt broke hip in 8/17)   Timed Up and Go score (seconds) 1st 13.45, 2nd 11.88   Fall screen comments Pt was seen my physical therapy in the fall of 2017   Pain   Patient Currently in Pain No   Physical Assessments   Incisions WNL   Edema None   Right Lung Sounds not assessed   Left Lung Sounds not assessed   Limitations Orthopedic   Comments Pt had hip surgery in 8/217   Individualized Treatment Plan   Monitored Sessions Scheduled 24   Monitored Sessions Attended 27   Oxygen   Supplemental Oxygen needed No   Nutrition Management - Weight Management   Assessment Re-assessment   Age 72   Weight 76.7 kg (169 lb)   Height 1.88 m (6' 2.02\")   BMI (Calculated) 21.73   Initial Rate Your Plate Score. Dietary tool to assess eating patterns. Scores range from 24 to 72. The higher the score the healthier the eating pattern. 64   Weight " Management Comments 4/26 Pt trying to gain wt lost after hip surgery. He states dr may reduce BP medication until he gains more weight.5/25 pt notes that he is comfortable with his current weight.    Nutrition Management - Lipids   Lipids Labs Available   Date 10/25/17   Total Cholesterol 92   Triglycerides 100   HDL 41   LDL 30   Prescribed Lipid Medication Yes   Statin Intensity Low Intensity   Nutrition Management - Diabetes   Diabetes Type II   Do you Monitor BS at Home? Other (see comments)  (Pt has started to check check BS more regular)   Diabetes Medication Prescribed Yes, Insulin;Yes, Oral Medication   Hb A1C Date: 10/25/17   Hb A1C Result: 6.5   Nutrition Management Summary   Dietary Recommendations Low Fat;Low Cholesterol;Low Sodium   Stages of Change for Diet Compliance Preparation   Interventions Planned Attend Nutrition Education Class(es)   Psychosocial Management   Psychosocial Assessment Re-assessment   Is there history of clinical depression or increased risk of depression? No previous history   Current Level of Stress per Patient Report Moderate    Current Coping Skills Other (see comments)  (Prayer and smoking)   Initial Patient Health Questionnaire -9 Score (PHQ-9) for depression. 5-9 Minimal symptoms, 10-14 Minor depression, 15-19 Major depression, moderately severe, > 20 Major depression, severe  3   Initial [facility] Survey score.  Quality of Life:   If total score > 25 review individual areas where patient rated a 4 or 5.  Consider patients current medical condition and what role that plays on the score.   Adjust treatment protocol to improve areas of concern.  Consider the following:  PHQ9 score, DASI, and re-assessment within the next 30 days to assist with developing treatments.  21   Stages of Change Preparation   Interventions Planned Patient denies need for intervention at this time.   Other Core Components - Hypertension   History of or Diagnosis of Hypertension Yes   Currently  taking Anti-Hypertensives Yes;Ace Inhibitor   Other Core Components - Tobacco   History of Tobacco Use Yes   Tobacco Use Status Currently smoking - everyday   Tobacco Habit Cigarettes   Tobacco Use per Day (average) (<0.5ppd)   Years of Tobacco Use 55   Stages of Change Pre-Contemplation   Tobacco Comments 4/26 Pt not ready to set quit date, but notes he is smoking 10 cigs a day and enjoys it less.5/25 Pt requests not do discuss smoking habits   Other Core Components Summary   Interventions Planned Educate on benefits of smoking cessation   Other Core Components Comments 5/25 pt in precontemplation for smoking cessation. Denies conversation about current habits.    Activity/Exercise History   Activity/Exercise Assessment Re-assessment   Activity/Exercise Status prior to event? Sedentary   Number of Days Currently participating in Moderate Physical Activity? 5   Number of Days Currently performing  Aerobic Exercise (including rehab)? 3   Number of Minutes per Session Currently of Aerobic Exercise (average)? 2   Current Stage of Change (Physical Activity) Action   Current Stage of Change (Aerobic Exercise) Action   Patient Goals Goal #1;Goal #2   Goal #1 Description Pt will increae his strength and endurance to be able to walk 10 minutes without a cane   Goal #1 Target Date 04/29/18   Goal #1 Date Met 04/26/18   Goal #1 Progress Towards Goal 4/26 Pt is walking to/from rehab without can. He is no longer bringing it. He notes he is picking up his feel better and not dragging his feet as much.    Goal #2 Description Pt will have the confidence to be able to resume walking 40 minutes 3-5x per week.   Goal #2 Target Date 05/14/18   Goal #2 Date Met 05/25/18   Goal #2 Progress Towards Goal 4/26 Pt still working on confidence in walking 40 min. He is still walking 20 min outside of rehab 2 days/week. Pt also plans to start using 5 pound weights at home 1 extra day a week. 5/25 Pt notes comfort walking 40 minutes 3x/day with  his cane. Reports feeling more confident with cane and increased speed of walking.    Activity/Exercise Comments Pt was walking 40 minutes a day 4-5x a week up until he broke his hip in August   Exercise Assessment   6 Minute Walk Predicted - Gender Selection Male   6 Minute Walk Predicted (Male) 2058.41   6 Minute Walk Predicted (Female) 1584.79   Initial 6 Minute Walk Distance (Feet) 1156 ft   Resting HR 96 bpm   Exercise  bpm   Post Exercise HR 90 bpm   Resting BP 80/44   Exercise /44   Post Exercise BP 84/44   Pre  mg/dL   Effort Rating 5   Current MET Level 5.4   MET Level Goal 6 METs   ECG Rhythm Normal sinus rhythm   Ectopy None   Current Symptoms Denies symptoms   Limitations/Restrictions None   Exercise Prescription   Mode Treadmill;Airdyne;Nustep;Weights   Duration/Time 15-30 min;30-45 min   Frequency 3 daysweek   THR (85% of age predicted max HR) 127.5   OMNI Effort Rating (0-10 Scale) 4-6/10   Progression Continuous bouts;Aerobic exercise to OMNI rating of 5-7, and heart rate at or below target;Progress peak intensity by 1/2 MET per week;Total exercise time of 30-45 minutes   Recommended Home Exercise   Type of Exercise Walking   Frequency (days per week) daily   Duration (minutes per session) 15-30 min   Effort Rating Recommended 4-6/10   Current Home Exercise   Type of Exercise Walking   Frequency (days per week) 5   Duration (minutes per session) 45-60   Follow-up/On-going Support   Provider follow-up needed on the following No follow-up needed   Learning Assessment   Learner Patient   Primary Language English   Preferred Learning Style Reading;Pictures/Video   Barriers to Learning Hearing   Patient Education   Education recommended Anatomy and Physiology of the Heart;Exercise Principles;Medication Overview;Nutrition;Risk Factors;Smoking Cessation   Education Comments 4/26 Gave pt schedule and class description, encouraged to come to classes of interest. 5/25 pt denied want to attend  education classes.

## 2018-06-12 ENCOUNTER — OFFICE VISIT (OUTPATIENT)
Dept: PODIATRY | Facility: CLINIC | Age: 73
End: 2018-06-12
Payer: MEDICARE

## 2018-06-12 VITALS — HEART RATE: 83 BPM | SYSTOLIC BLOOD PRESSURE: 126 MMHG | DIASTOLIC BLOOD PRESSURE: 60 MMHG | OXYGEN SATURATION: 98 %

## 2018-06-12 DIAGNOSIS — L97.521 SKIN ULCER OF LEFT FOOT, LIMITED TO BREAKDOWN OF SKIN (H): ICD-10-CM

## 2018-06-12 DIAGNOSIS — E11.51 DIABETES MELLITUS WITH PERIPHERAL VASCULAR DISEASE (H): ICD-10-CM

## 2018-06-12 DIAGNOSIS — E11.49 TYPE II OR UNSPECIFIED TYPE DIABETES MELLITUS WITH NEUROLOGICAL MANIFESTATIONS, NOT STATED AS UNCONTROLLED(250.60) (H): ICD-10-CM

## 2018-06-12 DIAGNOSIS — L97.912 ULCER OF RIGHT LOWER EXTREMITY WITH FAT LAYER EXPOSED (H): ICD-10-CM

## 2018-06-12 DIAGNOSIS — L97.512 SKIN ULCER OF RIGHT FOOT WITH FAT LAYER EXPOSED (H): Primary | ICD-10-CM

## 2018-06-12 DIAGNOSIS — I87.8 VENOUS STASIS: ICD-10-CM

## 2018-06-12 PROCEDURE — 99213 OFFICE O/P EST LOW 20 MIN: CPT | Performed by: PODIATRIST

## 2018-06-12 NOTE — PATIENT INSTRUCTIONS
Thanks for coming today.  [facility]  [address] [place] [address]    To schedule future appointments in Ortho Clinic, you may call [phone].    To schedule ordered imaging by your Provider: Call [facility] at [phone]    [employer] available online at:   [email or web address]    Please call if any further questions or concerns [phone] and ask for the Orthopedic Department. Clinic hours 8 am to 5 pm.    Return to clinic if symptoms worsen.
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
## 2018-06-12 NOTE — MR AVS SNAPSHOT
After Visit Summary   6/12/2018    Amos Walker    MRN: [id number]           Patient Information     Date Of Birth          1945        Visit Information        Provider Department      6/12/2018 2:00 PM Brayan Mcclain DPM [facility]        Today's Diagnoses     Skin ulcer of right foot with fat layer exposed (H)    -  1    Skin ulcer of left foot, limited to breakdown of skin (H)        Ulcer of right lower extremity with fat layer exposed (H)        Venous stasis        Type II or unspecified type diabetes mellitus with neurological manifestations, not stated as uncontrolled(250.60) (H)        Diabetes mellitus with peripheral vascular disease (H)          Care Instructions    Thanks for coming today.  [facility]  [address] [place] [address]    To schedule future appointments in Ortho Clinic, you may call [phone].    To schedule ordered imaging by your Provider: Call [facility] at [phone]    VisualCV available online at:   [email or web address]    Please call if any further questions or concerns [phone] and ask for the Orthopedic Department. Clinic hours 8 am to 5 pm.    Return to clinic if symptoms worsen.            Follow-ups after your visit        Your next 10 appointments already scheduled     José 15, 2018  1:00 PM CDT   Cardiac Treatment with  Cardiac Rehab 1   [facility] [place], Cardiac Rehabilitation ([facility])    [address] [place] [address] 1st Floor [address]  [facility] [address] [phone]            Jun 19, 2018  2:00 PM CDT   [facility]   [facility]    [address] [place] [id number] [phone]            Jun 20, 2018 10:30 AM CDT   RETURN RETINA with Jen Aranda MD   [facility] ([facility])    [place]  [facility]  [address] [facility]  9th [address] Clin 9a  [facility] [address]   [phone]            Jun 21, 2018 10:00 AM CDT   LAB with NE LAB   [facility] ([facility])    [address] [facility] [address] [phone]           Please do not eat 10-12 hours before your appointment if you are coming in fasting for labs on lipids, cholesterol, or glucose (sugar). This does not apply to pregnant women. Water, hot tea and black coffee (with nothing added) are okay. Do not drink other fluids, diet soda or chew gum.            Jun 22, 2018  1:00 PM CDT   Cardiac Treatment with  Cardiac Rehab 1   [facility], [place], Cardiac Rehabilitation ([facility])    [facility] [address] 1st [address]  [facility] [address]   [phone]            Jun 29, 2018  8:55 AM CDT   (Arrive by 8:40 AM)   Return Visit with Racheal Swift MD   [facility] ([facility])    [address] [facility]  4th [facility] [address]   [phone]            Jul 10, 2018  1:45 PM CDT   Return Visit with Brayan Mcclain DPM   [facility])    [address] [facility] [address] [phone]            Jul 17, 2018  9:30 AM CDT   Return Visit with Brayan Mcclain DPM   [facility] ([facility])    [address] [facility] [address] [phone]            Jul 24, 2018  9:30 AM CDT   Return Visit with Brayan Mcclain DPM   [facility] ([facility])    [address] [facility] [address] [phone]            Jul 31, 2018  9:30 AM CDT   Return Visit with Brayan Mcclain DPM   [facility] ([facility])    [address] [facility] [address] [phone]              Who to contact      If you have questions or need follow up information about today's clinic visit or your schedule please contact [facility] directly at [phone].  Normal or non-critical lab and imaging results will be communicated to you by [employer], letter or phone within 4 business days after the clinic has received the results. If you do not hear from us within 7 days, please contact the clinic through [employer] or phone. If you have a critical or abnormal lab result, we will notify you by phone as soon as possible.  Submit refill requests through deskwolf or call your pharmacy and they will forward the refill request to us. Please allow 3 business days for your refill to be completed.          Additional Information About Your Visit        deskwolf Information     deskwolf gives you secure access to your electronic health record. If you see a primary care provider, you can also send messages to your care team and make appointments. If you have questions, please call your primary care clinic.  If you do not have a primary care provider, please call [phone] and they will assist you.      deskwolf is an electronic gateway that provides easy, online access to your medical records. With deskwolf, you can request a clinic appointment, read your test results, renew a prescription or communicate with your care team.     To access your existing account, please contact your [facility] or call [phone] for assistance.        Care EveryWhere ID     This is your Care EveryWhere ID. This could be used by other organizations to access your [facility] medical records  [id number]        Your Vitals Were     Pulse Pulse Oximetry                83 98%           Blood Pressure from Last 3 Encounters:   06/12/18 126/60   06/05/18 118/56   05/29/18 118/58    Weight from Last 3 Encounters:   06/08/18 76.7 kg (169 lb)   05/25/18 76.7 kg (169 lb)   05/22/18 77.6 kg (171 lb)              Today, you  had the following     No orders found for display         Today's Medication Changes          These changes are accurate as of 6/12/18  2:12 PM.  If you have any questions, ask your nurse or doctor.               These medicines have changed or have updated prescriptions.        Dose/Directions    * clopidogrel 75 MG tablet   Commonly known as:  PLAVIX   This may have changed:  when to take this   Used for:  Peripheral vascular disease, unspecified        Dose:  75 mg   Take 1 tablet (75 mg) by mouth daily   Quantity:  30 tablet   Refills:  11       * clopidogrel 75 MG tablet   Commonly known as:  PLAVIX   This may have changed:  Another medication with the same name was changed. Make sure you understand how and when to take each.   Used for:  Peripheral vascular disease (H)        Dose:  75 mg   Take 1 tablet (75 mg) by mouth daily   Quantity:  30 tablet   Refills:  3       gentamicin 0.1 % cream   Commonly known as:  GARAMYCIN   This may have changed:    - when to take this  - reasons to take this  - additional instructions   Used for:  Ulcer of right lower leg, with fat layer exposed (H), Chronic venous hypertension with ulcer involving right side (H), Type 2 diabetes, controlled, with neuropathy (H)        Apply topically daily To right leg ulcer.   Quantity:  30 g   Refills:  5       * Notice:  This list has 2 medication(s) that are the same as other medications prescribed for you. Read the directions carefully, and ask your doctor or other care provider to review them with you.             Primary Care Provider Office Phone # Fax #    Racheal Swift [phone] [address] [address]        Equal Access to Services     [facility] : Nataliia Bedoya, miguel abebe, qacristopher kaalyolanda reich. So [facility] [phone].    ATENCIÓN: Si habla español, tiene a rodrigues disposición servicios gratuitos de asistencia  lingüística. Mary al [phone].    We comply with applicable federal civil rights laws and [place] laws. We do not discriminate on the basis of race, color, national origin, age, disability, sex, sexual orientation, or gender identity.            Thank you!     Thank you for choosing [facility]  for your care. Our goal is always to provide you with excellent care. Hearing back from our patients is one way we can continue to improve our services. Please take a few minutes to complete the written survey that you may receive in the mail after your visit with us. Thank you!             Your Updated Medication List - Protect others around you: Learn how to safely use, store and throw away your medicines at www.disposemymeds.org.          This list is accurate as of 6/12/18  2:12 PM.  Always use your most recent med list.                   Brand Name Dispense Instructions for use Diagnosis    ammonium lactate 12 % cream    LAC-HYDRIN    385 g    Apply topically 2 times daily as needed for dry skin    Venous stasis, Type 2 diabetes, controlled, with neuropathy (H)       ascorbic acid 500 MG Tabs     30 tablet    Take 1 tablet (500 mg) by mouth 2 times daily    Ulcer of right lower leg, with fat layer exposed (H)       ASPIRIN PO      Take 81 mg by mouth daily        blood glucose monitoring lancets     3 Box    Use to test blood sugars 2 as directed.    Type 2 diabetes, uncontrolled, with neuropathy (H)       Blood Pressure Kit     1 kit    1 Device daily    Benign essential hypertension       cefadroxil 500 MG capsule    DURICEF    28 capsule    Take 1 capsule (500 mg) by mouth 2 times daily    Skin ulcer of left foot, limited to breakdown of skin (H), Type II or unspecified type diabetes mellitus with neurological manifestations, not stated as uncontrolled(250.60) (H), Diabetes mellitus with peripheral vascular disease (H)       * clopidogrel 75 MG tablet    PLAVIX    30 tablet    Take 1 tablet  "(75 mg) by mouth daily    Peripheral vascular disease, unspecified       * clopidogrel 75 MG tablet    PLAVIX    30 tablet    Take 1 tablet (75 mg) by mouth daily    Peripheral vascular disease (H)       ferrous sulfate 325 (65 Fe) MG tablet    IRON    60 tablet    Take 1 tablet (325 mg) by mouth 2 times daily    Peripheral vascular disease, unspecified       gentamicin 0.1 % cream    GARAMYCIN    30 g    Apply topically daily To right leg ulcer.    Ulcer of right lower leg, with fat layer exposed (H), Chronic venous hypertension with ulcer involving right side (H), Type 2 diabetes, controlled, with neuropathy (H)       insulin pen needle 31G X 8 MM    B-D U/F    100 each    Use 1 daily o as directed    Diabetes mellitus, type II (H)       LANTUS SOLOSTAR 100 UNIT/ML injection   Generic drug:  insulin glargine     3 mL    Inject 18 Units Subcutaneous daily (with dinner) May take up to 25 units daily    Type 2 diabetes mellitus with diabetic peripheral angiopathy without gangrene, with long-term current use of insulin (H)       LISINOPRIL PO      Take 20 mg by mouth 2 times daily        * nateglinide 120 MG tablet    STARLIX    90 tablet    TAKE 1 TABLET BY MOUTH THREE TIMES DAILY BEFORE MEALS    Type 2 diabetes, controlled, with neuropathy (H)       * nateglinide 120 MG tablet    STARLIX    90 tablet    Take 1 tablet (120 mg) by mouth 3 times daily (before meals)    Diabetes mellitus (H)       ONETOUCH ULTRA test strip   Generic drug:  blood glucose monitoring     200 strip    USE TO TEST TWICE DAILY OR AS DIRECTED    Type 2 diabetes mellitus (H)       OPTIFOAM 6\"X6\" Pads     1 each    1 Box once a week    Ulcer of right leg, with fat layer exposed (H)       order for DME     2 each    Please measure and distribute 1 pair of 20mm Hg - 30mm Hg knee high ULCER CARE open or closed toe compression stockings.  Patient has a size 13 foot and please take this into consideration.  Jobst or equivalent    Varicose veins of " lower extremities with other complications, Venous stasis ulcer of right lower extremity (H)       order for DME     3 each    Please measure and distribute 1 pair of 20mmHg - 30mmHg knee high open or closed toe compression stockings. Jobst ultrasheer or equivalent.    Varicose veins of both lower extremities with complications       order for DME     2 each    Please measure and distribute 1 pair of 30mmHg - 40mmHg knee high open toe ulcercare compression stockings. Jobst ultrasheer or equivalent.    Varicose veins of bilateral lower extremities with other complications       sildenafil 50 MG tablet    VIAGRA    10 tablet    Take 1 tablet (50 mg) by mouth daily as needed for erectile dysfunction    Vasculogenic erectile dysfunction, unspecified vasculogenic erectile dysfunction type       silver sulfADIAZINE 1 % cream    SILVADENE    85 g    Apply topically daily To affected areas on right foot and leg.    Ulcer of right lower leg, with fat layer exposed (H), Chronic venous hypertension with ulcer involving right side (H), Type 2 diabetes, controlled, with neuropathy (H)       simvastatin 10 MG tablet    ZOCOR    90 tablet    Take 1 tablet (10 mg) by mouth At Bedtime    Type 2 diabetes, controlled, with neuropathy (H)       triamcinolone 0.1 % cream    KENALOG    30 g    Apply sparingly to left heel daily.    Dermatitis of left foot       VITAMIN C PO      Take 1,000 mg by mouth        VITAMIN D (CHOLECALCIFEROL) PO      Take 1,000 Units by mouth 2 times daily        * Notice:  This list has 4 medication(s) that are the same as other medications prescribed for you. Read the directions carefully, and ask your doctor or other care provider to review them with you.

## 2018-06-12 NOTE — NURSING NOTE
Amos Walker's chief complaint for this visit includes:  Chief Complaint   Patient presents with     RECHECK     right leg wound check      PCP: Racheal Swift    Referring Provider:  No referring provider defined for this encounter.    /60  Pulse 83  SpO2 98%  Data Unavailable     Do you need any medication refills at today's visit? no
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
## 2018-06-15 ENCOUNTER — HOSPITAL ENCOUNTER (OUTPATIENT)
Dept: CARDIAC REHAB | Facility: CLINIC | Age: 73
End: 2018-06-15
Attending: INTERNAL MEDICINE
Payer: MEDICARE

## 2018-06-15 DIAGNOSIS — H90.8 MIXED CONDUCTIVE AND SENSORINEURAL HEARING LOSS, UNSPECIFIED LATERALITY: Primary | ICD-10-CM

## 2018-06-15 PROCEDURE — 93798 PHYS/QHP OP CAR RHAB W/ECG: CPT

## 2018-06-15 PROCEDURE — 40000116 ZZH STATISTIC OP CR VISIT

## 2018-06-19 ENCOUNTER — OFFICE VISIT (OUTPATIENT)
Dept: PODIATRY | Facility: CLINIC | Age: 73
End: 2018-06-19
Payer: MEDICARE

## 2018-06-19 VITALS
WEIGHT: 169 LBS | BODY MASS INDEX: 21.69 KG/M2 | HEART RATE: 91 BPM | OXYGEN SATURATION: 99 % | HEIGHT: 74 IN | SYSTOLIC BLOOD PRESSURE: 133 MMHG | DIASTOLIC BLOOD PRESSURE: 67 MMHG

## 2018-06-19 DIAGNOSIS — L97.512 SKIN ULCER OF RIGHT FOOT WITH FAT LAYER EXPOSED (H): Primary | ICD-10-CM

## 2018-06-19 DIAGNOSIS — E11.49 TYPE II OR UNSPECIFIED TYPE DIABETES MELLITUS WITH NEUROLOGICAL MANIFESTATIONS, NOT STATED AS UNCONTROLLED(250.60) (H): ICD-10-CM

## 2018-06-19 DIAGNOSIS — L97.912 ULCER OF RIGHT LOWER EXTREMITY WITH FAT LAYER EXPOSED (H): ICD-10-CM

## 2018-06-19 DIAGNOSIS — E11.51 DIABETES MELLITUS WITH PERIPHERAL VASCULAR DISEASE (H): ICD-10-CM

## 2018-06-19 PROCEDURE — 99213 OFFICE O/P EST LOW 20 MIN: CPT | Performed by: PODIATRIST
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
## 2018-06-19 NOTE — MR AVS SNAPSHOT
After Visit Summary   6/19/2018    Amos Walker    MRN: [id number]           Patient Information     Date Of Birth          1945        Visit Information        Provider Department      6/19/2018 2:00 PM Brayan Mcclain DPM [facility]        Today's Diagnoses     Skin ulcer of right foot with fat layer exposed (H)    -  1    Ulcer of right lower extremity with fat layer exposed (H)        Diabetes mellitus with peripheral vascular disease (H)        Type II or unspecified type diabetes mellitus with neurological manifestations, not stated as uncontrolled(250.60) (H)           Follow-ups after your visit        Your next 10 appointments already scheduled     Jun 19, 2018  2:00 PM CDT   Return Visit with Brayan Mcclain DPM   [facility] ([facility])    [address] [place] [id number] [phone]            Jun 20, 2018 10:30 AM CDT   RETURN RETINA with Jen Aranda MD   [facility] ([facility])    [address]  [facility] [place] [address]   [phone]            Jun 21, 2018 10:00 AM CDT   LAB with NE LAB   [facility]    [address] [place] [address] [phone]           Please do not eat 10-12 hours before your appointment if you are coming in fasting for labs on lipids, cholesterol, or glucose (sugar). This does not apply to pregnant women. Water, hot tea and black coffee (with nothing added) are okay. Do not drink other fluids, diet soda or chew gum.            Jun 22, 2018  1:00 PM CDT   Cardiac Treatment with  Cardiac Rehab 1   [facility] [place] ([facility])    [address]  [facility] [place] [address] [phone]            Jun 29, 2018  8:55 AM CDT   (Arrive by  8:40 AM)   Return Visit with Racheal Swift MD   [facility] ([facility])    [address]  4th Floor  [facility] [address]   [phone]            Jul 10, 2018  1:45 PM CDT   Return Visit with Brayan Mcclain DPM   [facility]    [address] [facility] [address]   [phone]            Jul 17, 2018  9:30 AM CDT   Return Visit with Brayan Mcclain DPM   [facility]    [address] [facility] [address] [phone]            Jul 24, 2018  9:30 AM CDT   Return Visit with Brayan Mcclain DPM   [facility]    [address] [place] [address] [phone]            Jul 31, 2018  3:00 PM CDT   Return Visit with Brayan Mcclain DPM   [facility]    [address] [place] [address] [phone]            Aug 14, 2018  2:30 PM CDT   Return Visit with Brayan Mcclain DPM   [facility]    [address] [place] [address] [phone]              Who to contact     If you have questions or need follow up information about today's clinic visit or your schedule please contact [facility] directly at [phone].  Normal or non-critical lab and imaging results will be communicated to you by MyChart, letter or phone within 4 business days after the clinic has received the results. If you do not hear from us within 7 days, please contact the clinic through MyChart or phone. If you have a critical or abnormal lab result, we will notify you by phone as soon as possible.  Submit refill requests through [facility] or call your pharmacy and they will forward the refill request to us. Please allow 3 business days for your refill to be  "completed.          Additional Information About Your Visit        [employer] Information     [employer] gives you secure access to your electronic health record. If you see a primary care provider, you can also send messages to your care team and make appointments. If you have questions, please call your primary care clinic.  If you do not have a primary care provider, please call [phone] and they will assist you.      [employer] is an electronic gateway that provides easy, online access to your medical records. With [employer], you can request a clinic appointment, read your test results, renew a prescription or communicate with your care team.     To access your existing account, please contact your [facility] or call [phone] for assistance.        Care EveryWhere ID     This is your Care EveryWhere ID. This could be used by other organizations to access your [facility] medical records  [id number]        Your Vitals Were     Pulse Height Pulse Oximetry BMI (Body Mass Index)          91 1.88 m (6' 2\") 99% 21.7 kg/m2         Blood Pressure from Last 3 Encounters:   06/19/18 133/67   06/12/18 126/60   06/05/18 118/56    Weight from Last 3 Encounters:   06/19/18 76.7 kg (169 lb)   06/08/18 76.7 kg (169 lb)   05/25/18 76.7 kg (169 lb)              Today, you had the following     No orders found for display         Today's Medication Changes          These changes are accurate as of 6/19/18  1:59 PM.  If you have any questions, ask your nurse or doctor.               These medicines have changed or have updated prescriptions.        Dose/Directions    * clopidogrel 75 MG tablet   Commonly known as:  PLAVIX   This may have changed:  when to take this   Used for:  Peripheral vascular disease, unspecified        Dose:  75 mg   Take 1 tablet (75 mg) by mouth daily   Quantity:  30 tablet   Refills:  11       * clopidogrel 75 MG tablet   Commonly known as:  PLAVIX   This may have changed:  " Another medication with the same name was changed. Make sure you understand how and when to take each.   Used for:  Peripheral vascular disease (H)        Dose:  75 mg   Take 1 tablet (75 mg) by mouth daily   Quantity:  30 tablet   Refills:  3       gentamicin 0.1 % cream   Commonly known as:  GARAMYCIN   This may have changed:    - when to take this  - reasons to take this  - additional instructions   Used for:  Ulcer of right lower leg, with fat layer exposed (H), Chronic venous hypertension with ulcer involving right side (H), Type 2 diabetes, controlled, with neuropathy (H)        Apply topically daily To right leg ulcer.   Quantity:  30 g   Refills:  5       * Notice:  This list has 2 medication(s) that are the same as other medications prescribed for you. Read the directions carefully, and ask your doctor or other care provider to review them with you.             Primary Care Provider Office Phone # Fax #    Racheal Swift [phone] [address] [address]        Equal Access to Services     [facility] : Hadii niurka webb hadasho Soomaali, waaxda luqadaha, qaybta kaalmada adeegyada, waxay bhargavi duran . So [facility] [phone].    ATENCIÓN: Si habla español, tiene a rodrigues disposición servicios gratuitos de asistencia lingüística. Ll[facility] [phone].    We comply with applicable federal civil rights laws and [place] laws. We do not discriminate on the basis of race, color, national origin, age, disability, sex, sexual orientation, or gender identity.            Thank you!     Thank you for choosing [facility]  for your care. Our goal is always to provide you with excellent care. Hearing back from our patients is one way we can continue to improve our services. Please take a few minutes to complete the written survey that you may receive in the mail after your visit with us. Thank you!             Your Updated Medication  List - Protect others around you: Learn how to safely use, store and throw away your medicines at www.disposemymeds.org.          This list is accurate as of 6/19/18  1:59 PM.  Always use your most recent med list.                   Brand Name Dispense Instructions for use Diagnosis    ammonium lactate 12 % cream    LAC-HYDRIN    385 g    Apply topically 2 times daily as needed for dry skin    Venous stasis, Type 2 diabetes, controlled, with neuropathy (H)       ascorbic acid 500 MG Tabs     30 tablet    Take 1 tablet (500 mg) by mouth 2 times daily    Ulcer of right lower leg, with fat layer exposed (H)       ASPIRIN PO      Take 81 mg by mouth daily        blood glucose monitoring lancets     3 Box    Use to test blood sugars 2 as directed.    Type 2 diabetes, uncontrolled, with neuropathy (H)       Blood Pressure Kit     1 kit    1 Device daily    Benign essential hypertension       cefadroxil 500 MG capsule    DURICEF    28 capsule    Take 1 capsule (500 mg) by mouth 2 times daily    Skin ulcer of left foot, limited to breakdown of skin (H), Type II or unspecified type diabetes mellitus with neurological manifestations, not stated as uncontrolled(250.60) (H), Diabetes mellitus with peripheral vascular disease (H)       * clopidogrel 75 MG tablet    PLAVIX    30 tablet    Take 1 tablet (75 mg) by mouth daily    Peripheral vascular disease, unspecified       * clopidogrel 75 MG tablet    PLAVIX    30 tablet    Take 1 tablet (75 mg) by mouth daily    Peripheral vascular disease (H)       ferrous sulfate 325 (65 Fe) MG tablet    IRON    60 tablet    Take 1 tablet (325 mg) by mouth 2 times daily    Peripheral vascular disease, unspecified       gentamicin 0.1 % cream    GARAMYCIN    30 g    Apply topically daily To right leg ulcer.    Ulcer of right lower leg, with fat layer exposed (H), Chronic venous hypertension with ulcer involving right side (H), Type 2 diabetes, controlled, with neuropathy (H)       insulin pen  "needle 31G X 8 MM    B-D U/F    100 each    Use 1 daily o as directed    Diabetes mellitus, type II (H)       LANTUS SOLOSTAR 100 UNIT/ML injection   Generic drug:  insulin glargine     3 mL    Inject 18 Units Subcutaneous daily (with dinner) May take up to 25 units daily    Type 2 diabetes mellitus with diabetic peripheral angiopathy without gangrene, with long-term current use of insulin (H)       LISINOPRIL PO      Take 20 mg by mouth 2 times daily        * nateglinide 120 MG tablet    STARLIX    90 tablet    TAKE 1 TABLET BY MOUTH THREE TIMES DAILY BEFORE MEALS    Type 2 diabetes, controlled, with neuropathy (H)       * nateglinide 120 MG tablet    STARLIX    90 tablet    Take 1 tablet (120 mg) by mouth 3 times daily (before meals)    Diabetes mellitus (H)       ONETOUCH ULTRA test strip   Generic drug:  blood glucose monitoring     200 strip    USE TO TEST TWICE DAILY OR AS DIRECTED    Type 2 diabetes mellitus (H)       OPTIFOAM 6\"X6\" Pads     1 each    1 Box once a week    Ulcer of right leg, with fat layer exposed (H)       order for DME     2 each    Please measure and distribute 1 pair of 20mm Hg - 30mm Hg knee high ULCER CARE open or closed toe compression stockings.  Patient has a size 13 foot and please take this into consideration.  Jobst or equivalent    Varicose veins of lower extremities with other complications, Venous stasis ulcer of right lower extremity (H)       order for DME     3 each    Please measure and distribute 1 pair of 20mmHg - 30mmHg knee high open or closed toe compression stockings. Jobst ultrasheer or equivalent.    Varicose veins of both lower extremities with complications       order for DME     2 each    Please measure and distribute 1 pair of 30mmHg - 40mmHg knee high open toe ulcercare compression stockings. Jobst ultrasheer or equivalent.    Varicose veins of bilateral lower extremities with other complications       sildenafil 50 MG tablet    VIAGRA    10 tablet    Take 1 " tablet (50 mg) by mouth daily as needed for erectile dysfunction    Vasculogenic erectile dysfunction, unspecified vasculogenic erectile dysfunction type       silver sulfADIAZINE 1 % cream    SILVADENE    85 g    Apply topically daily To affected areas on right foot and leg.    Ulcer of right lower leg, with fat layer exposed (H), Chronic venous hypertension with ulcer involving right side (H), Type 2 diabetes, controlled, with neuropathy (H)       simvastatin 10 MG tablet    ZOCOR    90 tablet    Take 1 tablet (10 mg) by mouth At Bedtime    Type 2 diabetes, controlled, with neuropathy (H)       triamcinolone 0.1 % cream    KENALOG    30 g    Apply sparingly to left heel daily.    Dermatitis of left foot       VITAMIN C PO      Take 1,000 mg by mouth        VITAMIN D (CHOLECALCIFEROL) PO      Take 1,000 Units by mouth 2 times daily        * Notice:  This list has 4 medication(s) that are the same as other medications prescribed for you. Read the directions carefully, and ask your doctor or other care provider to review them with you.
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
## 2018-06-20 ENCOUNTER — OFFICE VISIT (OUTPATIENT)
Dept: OPHTHALMOLOGY | Facility: CLINIC | Age: 73
End: 2018-06-20
Attending: OPHTHALMOLOGY
Payer: MEDICARE

## 2018-06-20 DIAGNOSIS — E11.3293 MILD NONPROLIFERATIVE DIABETIC RETINOPATHY OF BOTH EYES ASSOCIATED WITH TYPE 2 DIABETES MELLITUS, MACULAR EDEMA PRESENCE UNSPECIFIED (H): Primary | ICD-10-CM

## 2018-06-20 DIAGNOSIS — H52.13 MYOPIA, BILATERAL: ICD-10-CM

## 2018-06-20 DIAGNOSIS — H25.813 COMBINED FORMS OF AGE-RELATED CATARACT OF BOTH EYES: ICD-10-CM

## 2018-06-20 DIAGNOSIS — E11.9 DIABETES MELLITUS WITHOUT COMPLICATION (H): ICD-10-CM

## 2018-06-20 PROCEDURE — G0463 HOSPITAL OUTPT CLINIC VISIT: HCPCS | Mod: ZF

## 2018-06-20 PROCEDURE — 92134 CPTRZ OPH DX IMG PST SGM RTA: CPT | Mod: ZF | Performed by: OPHTHALMOLOGY

## 2018-06-20 PROCEDURE — 92015 DETERMINE REFRACTIVE STATE: CPT | Mod: GY,ZF

## 2018-06-20 ASSESSMENT — EXTERNAL EXAM - RIGHT EYE: OD_EXAM: NORMAL

## 2018-06-20 ASSESSMENT — VISUAL ACUITY
OS_CC+: -2
CORRECTION_TYPE: GLASSES
OS_CC: 20/40
OD_CC: 20/30
METHOD: SNELLEN - LINEAR

## 2018-06-20 ASSESSMENT — SLIT LAMP EXAM - LIDS
COMMENTS: NORMAL
COMMENTS: NORMAL

## 2018-06-20 ASSESSMENT — TONOMETRY
OS_IOP_MMHG: 19
OD_IOP_MMHG: 17
IOP_METHOD: TONOPEN

## 2018-06-20 ASSESSMENT — REFRACTION_MANIFEST
OD_SPHERE: -2.50
OS_CYLINDER: +1.25
OD_CYLINDER: +3.00
OS_SPHERE: -1.75
OD_ADD: +2.75
OS_AXIS: 160
OS_ADD: +2.75
OD_AXIS: 010

## 2018-06-20 ASSESSMENT — REFRACTION_WEARINGRX
OS_SPHERE: -1.50
OS_AXIS: 008
OD_AXIS: 170
OD_SPHERE: -2.00
OS_CYLINDER: +1.25
OD_CYLINDER: +3.25

## 2018-06-20 ASSESSMENT — CONF VISUAL FIELD
METHOD: COUNTING FINGERS
OD_NORMAL: 1
OS_NORMAL: 1

## 2018-06-20 ASSESSMENT — CUP TO DISC RATIO
OD_RATIO: 0.35
OS_RATIO: 0.35

## 2018-06-20 ASSESSMENT — EXTERNAL EXAM - LEFT EYE: OS_EXAM: NORMAL

## 2018-06-20 NOTE — MR AVS SNAPSHOT
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      Your next 10 appointments already scheduled     Jun 22, 2018  1:00 PM CDT   Cardiac Treatment with  Cardiac Rehab 1   [facility] [place], Cardiac Rehabilitation ([facility])    [address] [place] [address] 1st Floor 19  [facility] [address]   [phone]            Jun 29, 2018  8:55 AM CDT   (Arrive by 8:40 AM)   Return Visit with Racheal Swift MD   [facility] ([facility])    [address] [facility]  4th Floor  [facility] [address]   [phone]            Jul 10, 2018  1:45 PM CDT   Return Visit with Brayan Mcclain DPM   [facility])    [address] [address]   [phone]            Jul 17, 2018  9:30 AM CDT   Return Visit with Brayan Mcclain DPM   [facility])    [address] [facility] [address]   [phone]            Jul 24, 2018  9:30 AM CDT   Return Visit with Brayan Mcclain DPM   [facility])    [address] [facility] [address]   [phone]            Jul  31, 2018  3:00 PM CDT   [facility]    [address] [facility] [address]   [phone]            Aug 14, 2018  2:30 PM CDT   [facility] Visit with Brayan Mcclain DPM   [facility]    [address] [place] [address]   [phone]            Aug 16, 2018  8:00 AM CDT   (Arrive by 7:45 AM)   Hearing Aid Evaluation with Eddie Jane   [facility] ([facility])    [address] [facility]  4th Floor  [facility] [address] [phone]           Please see your medical professional for ear cleaning prior to this appointment if you believe wax buildup may be an issue. All patients are required to have a physician's order stating the medical reason for the hearing test. Your doctor can send an electronic order, use their own form or we have provided a form (called Physician's Order for Audiology Services). It states that there is a medical reason for your exam. Without an order you may need to be rescheduled until the order can be obtained.            Aug 21, 2018  2:30 PM CDT   [facility] with Brayan [facility]clain D[facility]   [facility]    [address] [place] [address]   [phone]            Aug 28, 2018  2:30 PM CDT   Return Visit with Brayan Mcclain DPM   [facility]    [address] [place] [id number] [phone]              Future tests that were ordered for you today     Open Future Orders        Priority Expected Expires Ordered    OCT Retina Spectralis OU (both eyes) Routine  12/22/2019 6/20/2018            Who to contact     Please call your clinic at [phone] to:    Ask questions about your health    Make or cancel appointments    Discuss your medicines    Learn about your  test results    Speak to your doctor            Additional Information About Your Visit        [employer] Information     [employer] gives you secure access to your electronic health record. If you see a primary care provider, you can also send messages to your care team and make appointments. If you have questions, please call your primary care clinic.  If you do not have a primary care provider, please call [phone] and they will assist you.      [employer] is an electronic gateway that provides easy, online access to your medical records. With [employer], you can request a clinic appointment, read your test results, renew a prescription or communicate with your care team.     To access your existing account, please contact your [facility] or call [phone] for assistance.        Care EveryWhere ID     This is your Care EveryWhere ID. This could be used by other organizations to access your [facility] medical records  [id number]         Blood Pressure from Last 3 Encounters:   06/19/18 133/67   06/12/18 126/60   06/05/18 118/56    Weight from Last 3 Encounters:   06/19/18 76.7 kg (169 lb)   06/08/18 76.7 kg (169 lb)   05/25/18 76.7 kg (169 lb)              We Performed the Following     OCT Retina Spectralis OU (both eyes)          Today's Medication Changes          These changes are accurate as of 6/20/18 11:59 PM.  If you have any questions, ask your nurse or doctor.               These medicines have changed or have updated prescriptions.        Dose/Directions    * clopidogrel 75 MG tablet   Commonly known as:  PLAVIX   This may have changed:  when to take this   Used for:  Peripheral vascular disease, unspecified        Dose:  75 mg   Take 1 tablet (75 mg) by mouth daily   Quantity:  30 tablet   Refills:  11       * clopidogrel 75 MG tablet   Commonly known as:  PLAVIX   This may have changed:  Another medication with the same name was changed. Make sure you understand how and when  to take each.   Used for:  Peripheral vascular disease (H)        Dose:  75 mg   Take 1 tablet (75 mg) by mouth daily   Quantity:  30 tablet   Refills:  3       gentamicin 0.1 % cream   Commonly known as:  GARAMYCIN   This may have changed:    - when to take this  - reasons to take this  - additional instructions   Used for:  Ulcer of right lower leg, with fat layer exposed (H), Chronic venous hypertension with ulcer involving right side (H), Type 2 diabetes, controlled, with neuropathy (H)        Apply topically daily To right leg ulcer.   Quantity:  30 g   Refills:  5       * Notice:  This list has 2 medication(s) that are the same as other medications prescribed for you. Read the directions carefully, and ask your doctor or other care provider to review them with you.             Primary Care Provider Office Phone # Fax #    Racheal Swift [phone] [address] [address]        Equal Access to Services     SAMSON MELTON : Hadii niurka cortezo Soger, waaxda luqgayatri, qaybta kaalmada adejessee, yolanda lopez. So [facility] [phone].    ATENCIÓN: Si habla bernard, tiene a rodrigues disposición servicios gratuitos de asistencia lingüística. Mary al [phone].    We comply with applicable federal civil rights laws and [place] laws. We do not discriminate on the basis of race, color, national origin, age, disability, sex, sexual orientation, or gender identity.            Thank you!     Thank you for choosing [facility]  for your care. Our goal is always to provide you with excellent care. Hearing back from our patients is one way we can continue to improve our services. Please take a few minutes to complete the written survey that you may receive in the mail after your visit with us. Thank you!             Your Updated Medication List - Protect others around you: Learn how to safely use, store and throw away your medicines at  www.disposemymeds.org.          This list is accurate as of 6/20/18 11:59 PM.  Always use your most recent med list.                   Brand Name Dispense Instructions for use Diagnosis    ammonium lactate 12 % cream    LAC-HYDRIN    385 g    Apply topically 2 times daily as needed for dry skin    Venous stasis, Type 2 diabetes, controlled, with neuropathy (H)       ascorbic acid 500 MG Tabs     30 tablet    Take 1 tablet (500 mg) by mouth 2 times daily    Ulcer of right lower leg, with fat layer exposed (H)       ASPIRIN PO      Take 81 mg by mouth daily        blood glucose monitoring lancets     3 Box    Use to test blood sugars 2 as directed.    Type 2 diabetes, uncontrolled, with neuropathy (H)       Blood Pressure Kit     1 kit    1 Device daily    Benign essential hypertension       cefadroxil 500 MG capsule    DURICEF    28 capsule    Take 1 capsule (500 mg) by mouth 2 times daily    Skin ulcer of left foot, limited to breakdown of skin (H), Type II or unspecified type diabetes mellitus with neurological manifestations, not stated as uncontrolled(250.60) (H), Diabetes mellitus with peripheral vascular disease (H)       * clopidogrel 75 MG tablet    PLAVIX    30 tablet    Take 1 tablet (75 mg) by mouth daily    Peripheral vascular disease, unspecified       * clopidogrel 75 MG tablet    PLAVIX    30 tablet    Take 1 tablet (75 mg) by mouth daily    Peripheral vascular disease (H)       ferrous sulfate 325 (65 Fe) MG tablet    IRON    60 tablet    Take 1 tablet (325 mg) by mouth 2 times daily    Peripheral vascular disease, unspecified       gentamicin 0.1 % cream    GARAMYCIN    30 g    Apply topically daily To right leg ulcer.    Ulcer of right lower leg, with fat layer exposed (H), Chronic venous hypertension with ulcer involving right side (H), Type 2 diabetes, controlled, with neuropathy (H)       insulin pen needle 31G X 8 MM    B-D U/F    100 each    Use 1 daily o as directed    Diabetes mellitus, type  "II (H)       LANTUS SOLOSTAR 100 UNIT/ML injection   Generic drug:  insulin glargine     3 mL    Inject 18 Units Subcutaneous daily (with dinner) May take up to 25 units daily    Type 2 diabetes mellitus with diabetic peripheral angiopathy without gangrene, with long-term current use of insulin (H)       LISINOPRIL PO      Take 20 mg by mouth 2 times daily        * nateglinide 120 MG tablet    STARLIX    90 tablet    TAKE 1 TABLET BY MOUTH THREE TIMES DAILY BEFORE MEALS    Type 2 diabetes, controlled, with neuropathy (H)       * nateglinide 120 MG tablet    STARLIX    90 tablet    Take 1 tablet (120 mg) by mouth 3 times daily (before meals)    Diabetes mellitus (H)       ONETOUCH ULTRA test strip   Generic drug:  blood glucose monitoring     200 strip    USE TO TEST TWICE DAILY OR AS DIRECTED    Type 2 diabetes mellitus (H)       OPTIFOAM 6\"X6\" Pads     1 each    1 Box once a week    Ulcer of right leg, with fat layer exposed (H)       order for DME     2 each    Please measure and distribute 1 pair of 20mm Hg - 30mm Hg knee high ULCER CARE open or closed toe compression stockings.  Patient has a size 13 foot and please take this into consideration.  Jobst or equivalent    Varicose veins of lower extremities with other complications, Venous stasis ulcer of right lower extremity (H)       order for DME     3 each    Please measure and distribute 1 pair of 20mmHg - 30mmHg knee high open or closed toe compression stockings. Jobst ultrasheer or equivalent.    Varicose veins of both lower extremities with complications       order for DME     2 each    Please measure and distribute 1 pair of 30mmHg - 40mmHg knee high open toe ulcercare compression stockings. Jobst ultrasheer or equivalent.    Varicose veins of bilateral lower extremities with other complications       sildenafil 50 MG tablet    VIAGRA    10 tablet    Take 1 tablet (50 mg) by mouth daily as needed for erectile dysfunction    Vasculogenic erectile " dysfunction, unspecified vasculogenic erectile dysfunction type       silver sulfADIAZINE 1 % cream    SILVADENE    85 g    Apply topically daily To affected areas on right foot and leg.    Ulcer of right lower leg, with fat layer exposed (H), Chronic venous hypertension with ulcer involving right side (H), Type 2 diabetes, controlled, with neuropathy (H)       simvastatin 10 MG tablet    ZOCOR    90 tablet    Take 1 tablet (10 mg) by mouth At Bedtime    Type 2 diabetes, controlled, with neuropathy (H)       triamcinolone 0.1 % cream    KENALOG    30 g    Apply sparingly to left heel daily.    Dermatitis of left foot       VITAMIN C PO      Take 1,000 mg by mouth        VITAMIN D (CHOLECALCIFEROL) PO      Take 1,000 Units by mouth 2 times daily        * Notice:  This list has 4 medication(s) that are the same as other medications prescribed for you. Read the directions carefully, and ask your doctor or other care provider to review them with you.

## 2018-06-20 NOTE — PROGRESS NOTES
I have confirmed the patient's Past Medical History, Past Surgical History, Social History, Family History, Problem List, Medication List and Technician note.    CC: follow up diabetes    Amos Walker is a 73 year old male with the following ophthalmologic problems:    Here for diabetic retinopathy check. Notes blurry vision in the left eye. Blood sugars have been fairly well controlled. Following up with endocrinologist next week.  History of DM2 x 25yrs, on insulin, most recent A1c, most recent HbA1c: 5.8 (4/2018)      ASSESSMENT/PLAN  1. 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 Patient advised that failure to adequately control each may lead to vision loss. The patient expressed understanding.  - OCT today without macular edema    2. Cataracts OU  - becoming visually significant OU  - discussed cataract surgery, he prefers to observe for now  - monitor yearly     3. Refractive Error  - BCVA with updated MRx 20/30 OS and 20/40 OD  - new Rx provided per pt's request     4. Drusen BE  - not AMD, mild      return to clinic: 1 year dilated fundus exam     Lisa Martell MD  Ophthalmology Resident       Complete documentation of historical and exam elements from today's encounter can be found in the full encounter summary report (not reduplicated in this progress note).  I personally obtained the chief complaint(s) and history of present illness.  I confirmed and edited as necessary the review of systems, past medical/surgical history, family history, social history, and examination findings as documented by others; and I examined the patient myself.  I personally reviewed the relevant tests, images, and reports as documented above.  I personally reviewed the ophthalmic test(s) associated with this encounter, agree with the interpretation(s) as documented by the resident/fellow, and have edited the  corresponding report(s) as necessary.   I formulated and edited as necessary the assessment and plan and discussed the findings and management plan with the patient and family       Larry Mcmullen MD PhD  Vitreoretinal Surgery Fellow  [facility]    Attestation:  I have seen and examined the patient with Dr. Mcmullen and agree with the findings in this note, as well as the interpretations of the diagnostic tests.      Jen Andersen MD PhD.  Professor & Chair

## 2018-06-20 NOTE — NURSING NOTE
Chief Complaints and History of Present Illnesses   Patient presents with     Eye Exam For Diabetes     yearly follow up both eyes     HPI    Affected eye(s):  Both   Symptoms:     Floaters (Comment: Occasional floaters BE, no changes.)   No flashes   No redness   No tearing   No Dryness         Do you have eye pain now?:  No      Comments:  Pt states vision is blurry in both eyes both distant and near. Pt notes slight irritation on his upper eyelids for the past 2-3 years.  DM2 BS: Pt did not measure BS today.  Lab Results       Component                Value               Date                       A1C                      5.8                 04/27/2018                 A1C                      6.5                 10/25/2017                 A1C                      6.7                 06/16/2017                 A1C                      6.3                 02/06/2017                 A1C                      6.6                 10/31/2016                Claudette MONK June 20, 2018 10:56 AM

## 2018-06-21 DIAGNOSIS — E11.8 TYPE 2 DIABETES MELLITUS WITH COMPLICATION, WITHOUT LONG-TERM CURRENT USE OF INSULIN (H): ICD-10-CM

## 2018-06-21 LAB — HBA1C MFR BLD: 6.6 % (ref 0–5.6)

## 2018-06-21 PROCEDURE — 83036 HEMOGLOBIN GLYCOSYLATED A1C: CPT | Performed by: INTERNAL MEDICINE

## 2018-06-21 PROCEDURE — 36415 COLL VENOUS BLD VENIPUNCTURE: CPT | Performed by: INTERNAL MEDICINE

## 2018-06-21 ASSESSMENT — 6 MINUTE WALK TEST (6MWT)
TOTAL DISTANCE WALKED (FT): 1156
GENDER SELECTION: MALE

## 2018-06-21 NOTE — PROGRESS NOTES
Physician cosignature/electronic signature indicates approval of this ITP document. I have established, reviewed and made necessary changes to the individualized treatment plan and exercise prescription for this patient.   06/21/18 1500   Session   Session 120 Day Individualized Treatment Plan  (1:1 Consult)   Certified through this date 07/25/18   Cardiac Rehab Assessment   Cardiac Rehab Assessment Amos Walker is a 72 year old gentleman with a history of PAD, and CAD presenting with two vessel CAD, and underwent coronary angiogram on 3/1 which demonstrated two vessel disease, however, d/t contrast load already given, and needed to wait until 3/8 for successful percutaneous coronary intervention of the left main, mid and proximal LAD, proximal and mid RCA. 3/29 Patient feels he is beginning to have more stamina walking. He has been relying on his cane less, but carrying with if he needs to use it. Currently at 2.7 METs and has attended 5 sessions. Skilled therapy is recommended to monitor CV resonse to exercise, to provide education on exercise principles and risk factors of smoking and support to acheive goals. 4/26 Pt is no longer using can or bringing it to rehab. He has attended 14 rehab sessions and feels he continues to gain strength in his legs. He is still smoking 0.5 ppd, but states he enjoys it less. Continued skilled therapy is recommended to monitor CV response to exercise, and to continue to provide education on introducing home exercise.  5/25 Pt would like to continue with rehab sessions as he feels he has been benefiting greatly. Continue to progress as tolerated. 6/8 Pt notes that he has initiated independent exercise routine at home 5x/week. Continues to walk without cane and notes improved confidence in his ability. Pt has desire to continue to progress with home exercise confidence and intensity.   General Information   Treatment Diagnosis Stent   Date of Treatment Diagnosis 03/08/18    Significant Past CV History PAD   Comorbidities PAD;DM   Other Medical History [facility]   Hospital Location [facility]   Hospital Discharge Date 03/08/18   Signs and Symptoms Post Hospital Discharge Fatigue   Outpatient Cardiac Rehab Start Date 03/15/18   Primary Physician Racheal Swift   Primary Physician Follow Up Completed   Surgeon Ghanshyam Moore MD   Cardiologist Dr. Chinchilla   Ejection Fraction 51%   Risk Stratification Low   Summary of Cath Report   Summary of Cath Report Available   Date Performed 03/01/18   Left Main 50-60%   LAD a long 50-60% proximal LAD stenosis, long 60% mLAD stenosis    LCX Prox 25-50%, mLCX 25-50, pLCX 50%   RCA pRCA 50-60% & mRCA 70%   Cath Report Comments Two vessel coronary artery disease with left main involvement.   Living and Work Status    Living Arrangements and Social Status [employer] Live with an adult   Return to Employment Retired   Occupation    Preventative Medications   CMS recommended medications Ace inhibitors;Antiplatelets;Lipid Lowering;Influenza vaccination;Pneumonia vaccination   Fall Risk Screen   Fall screen completed by Cardiac Rehab   Have you fallen 2 or more times in the past year? Yes   Have you fallen and had an injury in the past year? Yes  (Pt broke hip in 8/17)   Timed Up and Go score (seconds) 1st 13.45, 2nd 11.88   Fall screen comments Pt was seen my physical therapy in the fall of 2017   Pain   Patient Currently in Pain No   Physical Assessments   Incisions WNL   Edema None   Right Lung Sounds not assessed   Left Lung Sounds not assessed   Limitations Orthopedic   Comments Pt had hip surgery in 8/217   Individualized Treatment Plan   Monitored Sessions Scheduled 24   Monitored Sessions Attended 27   Oxygen   Supplemental Oxygen needed No   Nutrition Management - Weight Management   Assessment Re-assessment   Age 72   Initial Rate Your Plate Score. Dietary tool to assess eating patterns.  Scores range from 24 to 72. The higher the score the healthier the eating pattern. 64   Weight Management Comments 4/26 Pt trying to gain wt lost after hip surgery. He states dr may reduce BP medication until he gains more weight.5/25 pt notes that he is comfortable with his current weight.    Nutrition Management - Lipids   Lipids Labs Available   Date 10/25/17   Total Cholesterol 92   Triglycerides 100   HDL 41   LDL 30   Prescribed Lipid Medication Yes   Statin Intensity Low Intensity   Nutrition Management - Diabetes   Diabetes Type II   Do you Monitor BS at Home? Other (see comments)  (Pt has started to check check BS more regular)   Diabetes Medication Prescribed Yes, Insulin;Yes, Oral Medication   Hb A1C Date: 10/25/17   Hb A1C Result: 6.5   Nutrition Management Summary   Dietary Recommendations Low Fat;Low Cholesterol;Low Sodium   Stages of Change for Diet Compliance Preparation   Interventions Planned Attend Nutrition Education Class(es)   Psychosocial Management   Psychosocial Assessment Re-assessment   Is there history of clinical depression or increased risk of depression? No previous history   Current Level of Stress per Patient Report Moderate    Current Coping Skills Other (see comments)  (Prayer and smoking)   Initial Patient Health Questionnaire -9 Score (PHQ-9) for depression. 5-9 Minimal symptoms, 10-14 Minor depression, 15-19 Major depression, moderately severe, > 20 Major depression, severe  3   Initial [facility] Survey score.  Quality of Life:   If total score > 25 review individual areas where patient rated a 4 or 5.  Consider patients current medical condition and what role that plays on the score.   Adjust treatment protocol to improve areas of concern.  Consider the following:  PHQ9 score, DASI, and re-assessment within the next 30 days to assist with developing treatments.  21   Stages of Change Preparation   Interventions Planned Patient denies need for intervention at this time.    Other Core Components - Hypertension   History of or Diagnosis of Hypertension Yes   Currently taking Anti-Hypertensives Yes;Ace Inhibitor   Other Core Components - Tobacco   History of Tobacco Use Yes   Tobacco Use Status Currently smoking - everyday   Tobacco Habit Cigarettes   Tobacco Use per Day (average) (<0.5ppd)   Years of Tobacco Use 55   Stages of Change Pre-Contemplation   Tobacco Comments 4/26 Pt not ready to set quit date, but notes he is smoking 10 cigs a day and enjoys it less.5/25 Pt requests not do discuss smoking habits   Other Core Components Summary   Interventions Planned Educate on benefits of smoking cessation   Other Core Components Comments 5/25 pt in precontemplation for smoking cessation. Denies conversation about current habits.    Activity/Exercise History   Activity/Exercise Assessment Re-assessment   Activity/Exercise Status prior to event? Sedentary   Number of Days Currently participating in Moderate Physical Activity? 5   Number of Days Currently performing  Aerobic Exercise (including rehab)? 3   Number of Minutes per Session Currently of Aerobic Exercise (average)? 2   Current Stage of Change (Physical Activity) Action   Current Stage of Change (Aerobic Exercise) Action   Patient Goals Goal #1;Goal #2   Goal #1 Description Pt will increae his strength and endurance to be able to walk 10 minutes without a cane   Goal #1 Target Date 04/29/18   Goal #1 Date Met 04/26/18   Goal #1 Progress Towards Goal 4/26 Pt is walking to/from rehab without can. He is no longer bringing it. He notes he is picking up his feel better and not dragging his feet as much.    Goal #2 Description Pt will have the confidence to be able to resume walking 40 minutes 3-5x per week.   Goal #2 Target Date 05/14/18   Goal #2 Date Met 05/25/18   Goal #2 Progress Towards Goal 4/26 Pt still working on confidence in walking 40 min. He is still walking 20 min outside of rehab 2 days/week. Pt also plans to start using  5 pound weights at home 1 extra day a week. 5/25 Pt notes comfort walking 40 minutes 3x/day with his cane. Reports feeling more confident with cane and increased speed of walking.    Activity/Exercise Comments Pt was walking 40 minutes a day 4-5x a week up until he broke his hip in August   Exercise Assessment   6 Minute Walk Predicted - Gender Selection Male   Initial 6 Minute Walk Distance (Feet) 1156 ft   Resting HR 96 bpm   Exercise  bpm   Post Exercise HR 90 bpm   Resting BP 80/44   Exercise /44   Post Exercise BP 84/44   Pre  mg/dL   Effort Rating 5   Current MET Level 5.4   MET Level Goal 6 METs   ECG Rhythm Normal sinus rhythm   Ectopy None   Current Symptoms Denies symptoms   Limitations/Restrictions None   Exercise Prescription   Mode Treadmill;Airdyne;Nustep;Weights   Duration/Time 15-30 min;30-45 min   Frequency 3 daysweek   THR (85% of age predicted max HR) 127.5   OMNI Effort Rating (0-10 Scale) 4-6/10   Progression Continuous bouts;Aerobic exercise to OMNI rating of 5-7, and heart rate at or below target;Progress peak intensity by 1/2 MET per week;Total exercise time of 30-45 minutes   Recommended Home Exercise   Type of Exercise Walking   Frequency (days per week) daily   Duration (minutes per session) 15-30 min   Effort Rating Recommended 4-6/10   Current Home Exercise   Type of Exercise Walking   Frequency (days per week) 5   Duration (minutes per session) 45-60   Follow-up/On-going Support   Provider follow-up needed on the following No follow-up needed   Learning Assessment   Learner Patient   Primary Language English   Preferred Learning Style Reading;Pictures/Video   Barriers to Learning Hearing   Patient Education   Education recommended Anatomy and Physiology of the Heart;Exercise Principles;Medication Overview;Nutrition;Risk Factors;Smoking Cessation   Education Comments 4/26 Gave pt schedule and class description, encouraged to come to classes of interest. 5/25 pt denied  want to attend education classes.

## 2018-06-21 NOTE — ADDENDUM NOTE
Encounter addended by: Katy Acosta OT on: 6/21/2018  3:32 PM<BR>     Actions taken: Flowsheet data copied forward, Sign clinical note, Flowsheet accepted

## 2018-06-22 ENCOUNTER — HOSPITAL ENCOUNTER (OUTPATIENT)
Dept: CARDIAC REHAB | Facility: CLINIC | Age: 73
End: 2018-06-22
Attending: INTERNAL MEDICINE
Payer: MEDICARE

## 2018-06-22 PROCEDURE — 40000116 ZZH STATISTIC OP CR VISIT

## 2018-06-22 PROCEDURE — 93798 PHYS/QHP OP CAR RHAB W/ECG: CPT

## 2018-06-27 ENCOUNTER — HOSPITAL ENCOUNTER (OUTPATIENT)
Dept: CARDIAC REHAB | Facility: CLINIC | Age: 73
Setting detail: THERAPIES SERIES
End: 2018-06-27
Attending: INTERNAL MEDICINE
Payer: MEDICARE

## 2018-06-27 PROCEDURE — 40000116 ZZH STATISTIC OP CR VISIT: Performed by: REHABILITATION PRACTITIONER

## 2018-06-27 PROCEDURE — 93798 PHYS/QHP OP CAR RHAB W/ECG: CPT | Performed by: REHABILITATION PRACTITIONER

## 2018-06-29 ENCOUNTER — OFFICE VISIT (OUTPATIENT)
Dept: INTERNAL MEDICINE | Facility: CLINIC | Age: 73
End: 2018-06-29
Payer: MEDICARE

## 2018-06-29 VITALS
WEIGHT: 169.1 LBS | SYSTOLIC BLOOD PRESSURE: 95 MMHG | TEMPERATURE: 98.8 F | BODY MASS INDEX: 21.71 KG/M2 | HEART RATE: 102 BPM | DIASTOLIC BLOOD PRESSURE: 55 MMHG

## 2018-06-29 DIAGNOSIS — Z79.4 TYPE 2 DIABETES MELLITUS WITH DIABETIC PERIPHERAL ANGIOPATHY WITHOUT GANGRENE, WITH LONG-TERM CURRENT USE OF INSULIN (H): ICD-10-CM

## 2018-06-29 DIAGNOSIS — I10 BENIGN ESSENTIAL HYPERTENSION: Primary | ICD-10-CM

## 2018-06-29 DIAGNOSIS — I73.9 PVD (PERIPHERAL VASCULAR DISEASE) (H): ICD-10-CM

## 2018-06-29 DIAGNOSIS — E11.51 TYPE 2 DIABETES MELLITUS WITH DIABETIC PERIPHERAL ANGIOPATHY WITHOUT GANGRENE, WITH LONG-TERM CURRENT USE OF INSULIN (H): ICD-10-CM

## 2018-06-29 RX ORDER — FLURBIPROFEN SODIUM 0.3 MG/ML
SOLUTION/ DROPS OPHTHALMIC
Refills: 3 | COMMUNITY
Start: 2018-04-28 | End: 2018-08-06

## 2018-06-29 RX ORDER — LISINOPRIL 10 MG/1
10 TABLET ORAL DAILY
Qty: 90 TABLET | Refills: 3 | Status: SHIPPED | OUTPATIENT
Start: 2018-06-29 | End: 2019-05-08

## 2018-06-29 ASSESSMENT — PAIN SCALES - GENERAL: PAINLEVEL: MODERATE PAIN (5)

## 2018-06-29 NOTE — MR AVS SNAPSHOT
After Visit Summary   6/29/2018    Amos Walker    MRN: [id number]           Patient Information     Date Of Birth          1945        Visit Information        Provider Department      6/29/2018 8:55 AM Racheal Swift MD [facility]        Today's Diagnoses     Benign essential hypertension    -  1    Type 2 diabetes mellitus with diabetic peripheral angiopathy without gangrene, with long-term current use of insulin (H)          Care Instructions    Primary Care Center: [phone]     Primary Care Center Medication Refill Request Information:  * Please contact your pharmacy regarding ANY request for medication refills.  ** [facility] Prescription Fax = [phone]  * Please allow 3 business days for routine medication refills.  * Please allow 5 business days for controlled substance medication refills.     Primary Care Center Test Result notification information:  *You will be notified with in 7-10 days of your appointment day regarding the results of your test.  If you are on MyChart you will be notified as soon as the provider has reviewed the results and signed off on them.            Follow-ups after your visit        Follow-up notes from your care team     Return in about 4 months (around 10/29/2018) for Routine Visit.      Your next 10 appointments already scheduled     Jul 05, 2018  3:00 PM CDT   Cardiac Discharge with  Cardiac Rehab 1   [facility] [place] ([facility])    [address] [place] [address] 1st Floor 19  [facility] [address]   [phone]            Jul 10, 2018  1:45 PM CDT   Return Visit with Brayan Mcclain DPM   [facility])    [address] [place] [address]   [phone]            Jul 17, 2018  9:30 AM CDT   Return Visit with Brayan Mcclain DPM   [facility] ([facility]  [facility])    [address] [place] [address]   [phone]            Jul 24, 2018  9:30 AM CDT   Return Visit with Brayan Mcclain DPM   [facility] ([facility])    [address] [place] [address]   [phone]            Jul 31, 2018  3:00 PM CDT   Return Visit with Brayan Mcclain DPM   [facility] ([facility])    [address] [place] [address]   [phone]            Aug 14, 2018  2:30 PM CDT   Return Visit with Brayan Mcclain DPM   [facility] ([facility])    [address] [place] [address]   [phone]            Aug 15, 2018 12:45 PM CDT   (Arrive by 12:30 PM)   RETURN HIP with Ish Jackman MD   [facility] ([facility])    [address] [place] [address] [phone]            Aug 16, 2018  8:00 AM CDT   (Arrive by 7:45 AM)   Hearing Aid Evaluation with Eddie Jane   [facility] ([facility])    [address] [place] [id number] [phone]           Please see your medical professional for ear cleaning prior to this appointment if you believe wax buildup may be an issue. All patients are required to have a physician's order stating the medical reason for the hearing test. Your doctor can send an electronic order, use their own form or we have provided a form (called Physician's Order for Audiology Services). It states that there is a medical reason for your exam. Without an order you may need to be rescheduled until the order can be obtained.            Aug 21, 2018  2:30 PM CDT   Return Visit with Brayan Mcclain DPM   [facility] ([facility])    [address] [address] [place] [address]   [phone]            Aug 28, 2018  2:30 PM CDT   Return  Visit with Brayan Mcclain DPM   [facility] ([facility])    [address] [place] [address] [phone]              Who to contact     Please call your clinic at [phone] to:    Ask questions about your health    Make or cancel appointments    Discuss your medicines    Learn about your test results    Speak to your doctor            Additional Information About Your Visit        [employer] Left Information     [employer] gives you secure access to your electronic health record. If you see a primary care provider, you can also send messages to your care team and make appointments. If you have questions, please call your primary care clinic.  If you do not have a primary care provider, please call [phone] and they will assist you.      [employer] is an electronic gateway that provides easy, online access to your medical records. With [employer], you can request a clinic appointment, read your test results, renew a prescription or communicate with your care team.     To access your existing account, please contact your [facility] or call [phone] for assistance.        Care EveryWhere ID     This is your Care EveryWhere ID. This could be used by other organizations to access your [facility] medical records  [id number]        Your Vitals Were     Pulse Temperature BMI (Body Mass Index)             102 98.8  F (37.1  C) (Oral) 21.71 kg/m2          Blood Pressure from Last 3 Encounters:   06/29/18 95/55   06/19/18 133/67   06/12/18 126/60    Weight from Last 3 Encounters:   06/29/18 76.7 kg (169 lb 1.6 oz)   06/19/18 76.7 kg (169 lb)   06/08/18 76.7 kg (169 lb)              Today, you had the following     No orders found for display         Today's Medication Changes          These changes are accurate as of 6/29/18  9:45 AM.  If you have any questions, ask your nurse or doctor.               These medicines have changed or have updated  prescriptions.        Dose/Directions    * clopidogrel 75 MG tablet   Commonly known as:  PLAVIX   This may have changed:  when to take this   Used for:  Peripheral vascular disease, unspecified        Dose:  75 mg   Take 1 tablet (75 mg) by mouth daily   Quantity:  30 tablet   Refills:  11       * clopidogrel 75 MG tablet   Commonly known as:  PLAVIX   This may have changed:  Another medication with the same name was changed. Make sure you understand how and when to take each.   Used for:  Peripheral vascular disease (H)        Dose:  75 mg   Take 1 tablet (75 mg) by mouth daily   Quantity:  30 tablet   Refills:  3       gentamicin 0.1 % cream   Commonly known as:  GARAMYCIN   This may have changed:    - when to take this  - reasons to take this  - additional instructions   Used for:  Ulcer of right lower leg, with fat layer exposed (H), Chronic venous hypertension with ulcer involving right side (H), Type 2 diabetes, controlled, with neuropathy (H)        Apply topically daily To right leg ulcer.   Quantity:  30 g   Refills:  5       insulin glargine 100 UNIT/ML injection   Commonly known as:  LANTUS SOLOSTAR   This may have changed:    - how much to take  - additional instructions   Used for:  Type 2 diabetes mellitus with diabetic peripheral angiopathy without gangrene, with long-term current use of insulin (H)   Changed by:  Racheal Swift MD        Dose:  16 Units   Inject 16 Units Subcutaneous daily (with dinner) May take up to 22 units daily   Quantity:  3 mL   Refills:  3       lisinopril 10 MG tablet   Commonly known as:  PRINIVIL/ZESTRIL   This may have changed:    - medication strength  - how much to take  - when to take this   Used for:  Benign essential hypertension   Changed by:  Racheal Swift MD        Dose:  10 mg   Take 1 tablet (10 mg) by mouth daily   Quantity:  90 tablet   Refills:  3       * Notice:  This list has 2 medication(s) that are the same as other medications prescribed  for you. Read the directions carefully, and ask your doctor or other care provider to review them with you.         Where to get your medicines      These medications were sent to [facility] [id number] - [address] AT [facility] [address], [facility] [address]     Phone:  [phone]     insulin glargine 100 UNIT/ML injection    lisinopril 10 MG tablet                Primary Care Provider Office Phone # Fax #    Racheal Swift [phone] [address] [address]        Equal Access to Services     SAMSON MELTON : Hadii aad ku hadasho Somaameali, waaxda luqadaha, qaybta kaalmada adeegyada, yolanda lopez. So [facility] [phone].    ATENCIÓN: Si habla español, tiene a rodrigues disposición servicios gratuitos de asistencia lingüística. [facility] [phone].    We comply with applicable federal civil rights laws and [place] laws. We do not discriminate on the basis of race, color, national origin, age, disability, sex, sexual orientation, or gender identity.            Thank you!     Thank you for choosing [facility]  for your care. Our goal is always to provide you with excellent care. Hearing back from our patients is one way we can continue to improve our services. Please take a few minutes to complete the written survey that you may receive in the mail after your visit with us. Thank you!             Your Updated Medication List - Protect others around you: Learn how to safely use, store and throw away your medicines at www.disposemymeds.org.          This list is accurate as of 6/29/18  9:45 AM.  Always use your most recent med list.                   Brand Name Dispense Instructions for use Diagnosis    ammonium lactate 12 % cream    LAC-HYDRIN    385 g    Apply topically 2 times daily as needed for dry skin    Venous stasis, Type 2 diabetes, controlled, with neuropathy (H)        ascorbic acid 500 MG Tabs     30 tablet    Take 1 tablet (500 mg) by mouth 2 times daily    Ulcer of right lower leg, with fat layer exposed (H)       ASPIRIN PO      Take 81 mg by mouth daily        blood glucose monitoring lancets     3 Box    Use to test blood sugars 2 as directed.    Type 2 diabetes, uncontrolled, with neuropathy (H)       Blood Pressure Kit     1 kit    1 Device daily    Benign essential hypertension       cefadroxil 500 MG capsule    DURICEF    28 capsule    Take 1 capsule (500 mg) by mouth 2 times daily    Skin ulcer of left foot, limited to breakdown of skin (H), Type II or unspecified type diabetes mellitus with neurological manifestations, not stated as uncontrolled(250.60) (H), Diabetes mellitus with peripheral vascular disease (H)       * clopidogrel 75 MG tablet    PLAVIX    30 tablet    Take 1 tablet (75 mg) by mouth daily    Peripheral vascular disease, unspecified       * clopidogrel 75 MG tablet    PLAVIX    30 tablet    Take 1 tablet (75 mg) by mouth daily    Peripheral vascular disease (H)       ferrous sulfate 325 (65 Fe) MG tablet    IRON    60 tablet    Take 1 tablet (325 mg) by mouth 2 times daily    Peripheral vascular disease, unspecified       gentamicin 0.1 % cream    GARAMYCIN    30 g    Apply topically daily To right leg ulcer.    Ulcer of right lower leg, with fat layer exposed (H), Chronic venous hypertension with ulcer involving right side (H), Type 2 diabetes, controlled, with neuropathy (H)       insulin glargine 100 UNIT/ML injection    LANTUS SOLOSTAR    3 mL    Inject 16 Units Subcutaneous daily (with dinner) May take up to 22 units daily    Type 2 diabetes mellitus with diabetic peripheral angiopathy without gangrene, with long-term current use of insulin (H)       * insulin pen needle 31G X 8 MM    B-D U/F    100 each    Use 1 daily o as directed    Diabetes mellitus, type II (H)       * B-D U/F 31G X 5 MM   Generic drug:  insulin pen needle      USE ONCE D  "OR UTD        lisinopril 10 MG tablet    PRINIVIL/ZESTRIL    90 tablet    Take 1 tablet (10 mg) by mouth daily    Benign essential hypertension       * nateglinide 120 MG tablet    STARLIX    90 tablet    TAKE 1 TABLET BY MOUTH THREE TIMES DAILY BEFORE MEALS    Type 2 diabetes, controlled, with neuropathy (H)       * nateglinide 120 MG tablet    STARLIX    90 tablet    Take 1 tablet (120 mg) by mouth 3 times daily (before meals)    Diabetes mellitus (H)       ONETOUCH ULTRA test strip   Generic drug:  blood glucose monitoring     200 strip    USE TO TEST TWICE DAILY OR AS DIRECTED    Type 2 diabetes mellitus (H)       OPTIFOAM 6\"X6\" Pads     1 each    1 Box once a week    Ulcer of right leg, with fat layer exposed (H)       order for DME     2 each    Please measure and distribute 1 pair of 20mm Hg - 30mm Hg knee high ULCER CARE open or closed toe compression stockings.  Patient has a size 13 foot and please take this into consideration.  Jobst or equivalent    Varicose veins of lower extremities with other complications, Venous stasis ulcer of right lower extremity (H)       order for DME     3 each    Please measure and distribute 1 pair of 20mmHg - 30mmHg knee high open or closed toe compression stockings. Jobst ultrasheer or equivalent.    Varicose veins of both lower extremities with complications       order for DME     2 each    Please measure and distribute 1 pair of 30mmHg - 40mmHg knee high open toe ulcercare compression stockings. Jobst ultrasheer or equivalent.    Varicose veins of bilateral lower extremities with other complications       sildenafil 50 MG tablet    VIAGRA    10 tablet    Take 1 tablet (50 mg) by mouth daily as needed for erectile dysfunction    Vasculogenic erectile dysfunction, unspecified vasculogenic erectile dysfunction type       silver sulfADIAZINE 1 % cream    SILVADENE    85 g    Apply topically daily To affected areas on right foot and leg.    Ulcer of right lower leg, with fat " layer exposed (H), Chronic venous hypertension with ulcer involving right side (H), Type 2 diabetes, controlled, with neuropathy (H)       simvastatin 10 MG tablet    ZOCOR    90 tablet    Take 1 tablet (10 mg) by mouth At Bedtime    Type 2 diabetes, controlled, with neuropathy (H)       triamcinolone 0.1 % cream    KENALOG    30 g    Apply sparingly to left heel daily.    Dermatitis of left foot       VITAMIN C PO      Take 1,000 mg by mouth        VITAMIN D (CHOLECALCIFEROL) PO      Take 1,000 Units by mouth 2 times daily        * Notice:  This list has 6 medication(s) that are the same as other medications prescribed for you. Read the directions carefully, and ask your doctor or other care provider to review them with you.
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
## 2018-06-29 NOTE — PATIENT INSTRUCTIONS
[facility]: [phone]     [facility] Medication Refill Request Information:  * Please contact your pharmacy regarding ANY request for medication refills.  ** [facility] Prescription Fax = [phone]  * Please allow 3 business days for routine medication refills.  * Please allow 5 business days for controlled substance medication refills.     [facility] Test Result notification information:  *You will be notified with in 7-10 days of your appointment day regarding the results of your test.  If you are on MyChart you will be notified as soon as the provider has reviewed the results and signed off on them.

## 2018-06-29 NOTE — NURSING NOTE
Chief Complaint   Patient presents with     Diabetes     pt here for diabetes check       Annel Das CMA at 8:49 AM on 6/29/2018.

## 2018-07-06 NOTE — MR AVS SNAPSHOT
After Visit Summary   5/11/2018    Amos Walker    MRN: [id number]           Patient Information     Date Of Birth          1945        Visit Information        Provider Department      5/11/2018 10:25 AM Racheal Swift MD [facility]        Today's Diagnoses     Type 2 diabetes mellitus with complication, without long-term current use of insulin (H)    -  1      Care Instructions    Primary Care Center: [phone]     Primary Care Center Medication Refill Request Information:  * Please contact your pharmacy regarding ANY request for medication refills.  ** [facility] Prescription Fax = [phone]  * Please allow 3 business days for routine medication refills.  * Please allow 5 business days for controlled substance medication refills.     Primary Care Center Test Result notification information:  *You will be notified with in 7-10 days of your appointment day regarding the results of your test.  If you are on MyChart you will be notified as soon as the provider has reviewed the results and signed off on them.        Please start taking 18 units of lantus.  Continue starlix.    Continue 10 mg of lisinopril (1/2 of 20 mg tab) but change to night time.    Come back in 2 months with labs prior to visit.          Follow-ups after your visit        Follow-up notes from your care team     Return in about 7 weeks (around 6/29/2018) for Routine Visit with labs prior.      Your next 10 appointments already scheduled     May 14, 2018  1:00 PM CDT   Cardiac Treatment with  Cardiac Rehab 1   [facility], [place], Cardiac Rehabilitation ([facility])    [address] [place] [address] 1st Floor 19  [facility] [address] [phone]            May 15, 2018  8:30 AM CDT   Return Visit with Brayan Mcclain DPM   [facility] ([facility])    [address] [place] [address] [phone]             May 16, 2018  1:00 PM CDT   Cardiac Treatment with Ur Cardiac Rehab 1   [facility], [place], Cardiac Rehabilitation ([facility])    [address] 1st [address]  [facility] [address]   [phone]            May 18, 2018  1:00 PM CDT   Cardiac Treatment with Ur Cardiac Rehab 1   [facility], [place], Cardiac Rehabilitation ([facility])    [address] 1st [address]  [facility] [address]   [phone]            May 22, 2018  8:00 AM CDT   Return Visit with Brayan Mcclain DPM   [facility]    [address] [facility] [address] [phone]            May 22, 2018  1:30 PM CDT   Return Visit with Rubio Chinchilla MD   [facility] ([facility])    [address] [place] [address] 2nd Floor  [facility] [address]   [phone]            May 29, 2018  8:30 AM CDT   Return Visit with Brayan Mcclain DPM   [facility] ([facility])    [address] [facility] [address]   [phone]            Jun 05, 2018  8:00 AM CDT   Return Visit with Brayan Mcclain DPM   [facility] ([facility])    [address] [facility] [address] [phone]            Jun 12, 2018  9:00 AM CDT   Return Visit with Brayan Mcclain DPM   [facility] ([facility])    [address] [facility] [address]   [phone]            Jun 19, 2018 10:00 AM CDT   Return Visit with Brayan Mcclain DPM   [facility] ([facility])    [address] [facility] [address] [phone]              Who to contact     Please call your clinic at [phone] to:    Ask questions about your health    Make or cancel  appointments    Discuss your medicines    Learn about your test results    Speak to your doctor            Additional Information About Your Visit        [employer] Information     [employer] gives you secure access to your electronic health record. If you see a primary care provider, you can also send messages to your care team and make appointments. If you have questions, please call your primary care clinic.  If you do not have a primary care provider, please call [phone] and they will assist you.      [employer] is an electronic gateway that provides easy, online access to your medical records. With [employer], you can request a clinic appointment, read your test results, renew a prescription or communicate with your care team.     To access your existing account, please contact your [facility] or call [phone] for assistance.        Care EveryWhere ID     This is your Care EveryWhere ID. This could be used by other organizations to access your [facility] medical records  [id number]        Your Vitals Were     Pulse BMI (Body Mass Index)                103 21.68 kg/m2           Blood Pressure from Last 3 Encounters:   05/11/18 143/73   05/08/18 130/60   05/01/18 134/60    Weight from Last 3 Encounters:   05/11/18 76.6 kg (168 lb 15.4 oz)   04/27/18 77.1 kg (170 lb)   04/26/18 76.2 kg (168 lb)              Today, you had the following     No orders found for display         Today's Medication Changes          These changes are accurate as of 5/11/18 10:54 AM.  If you have any questions, ask your nurse or doctor.               These medicines have changed or have updated prescriptions.        Dose/Directions    * clopidogrel 75 MG tablet   Commonly known as:  PLAVIX   This may have changed:  when to take this   Used for:  Peripheral vascular disease, unspecified        Dose:  75 mg   Take 1 tablet (75 mg) by mouth daily   Quantity:  30 tablet   Refills:  11       * clopidogrel 75 MG  tablet   Commonly known as:  PLAVIX   This may have changed:  Another medication with the same name was changed. Make sure you understand how and when to take each.   Used for:  Peripheral vascular disease (H)        Dose:  75 mg   Take 1 tablet (75 mg) by mouth daily   Quantity:  30 tablet   Refills:  3       gentamicin 0.1 % cream   Commonly known as:  GARAMYCIN   This may have changed:    - when to take this  - reasons to take this  - additional instructions   Used for:  Ulcer of right lower leg, with fat layer exposed (H), Chronic venous hypertension with ulcer involving right side (H), Type 2 diabetes, controlled, with neuropathy (H)        Apply topically daily To right leg ulcer.   Quantity:  30 g   Refills:  5       insulin glargine 100 UNIT/ML injection   Commonly known as:  LANTUS SOLOSTAR   This may have changed:    - how much to take  - additional instructions   Used for:  Type 2 diabetes mellitus with diabetic peripheral angiopathy without gangrene, with long-term current use of insulin (H)        Dose:  23 Units   Inject 23 Units Subcutaneous daily (with dinner) May take up to 25 units daily   Quantity:  3 mL   Refills:  3       * Notice:  This list has 2 medication(s) that are the same as other medications prescribed for you. Read the directions carefully, and ask your doctor or other care provider to review them with you.             Primary Care Provider Office Phone # Fax #    Racheal Swift [phone] [address] [address]        Equal Access to Services     [facility] : Nataliia Bedoya, waaxda luroxanna, qaybta kaalmada jose d, waxay bhargaiv lopez. So [facility] [phone].    ATENCIÓN: Si habla español, tiene a rodrigues disposición servicios gratuitos de asistencia lingüística. Llame al [phone].    We comply with applicable federal civil rights laws and [place] laws. We do not discriminate on the basis of  race, color, national origin, age, disability, sex, sexual orientation, or gender identity.            Thank you!     Thank you for choosing [facility]  for your care. Our goal is always to provide you with excellent care. Hearing back from our patients is one way we can continue to improve our services. Please take a few minutes to complete the written survey that you may receive in the mail after your visit with us. Thank you!             Your Updated Medication List - Protect others around you: Learn how to safely use, store and throw away your medicines at www.disposemymeds.org.          This list is accurate as of 5/11/18 10:54 AM.  Always use your most recent med list.                   Brand Name Dispense Instructions for use Diagnosis    ammonium lactate 12 % cream    LAC-HYDRIN    385 g    Apply topically 2 times daily as needed for dry skin    Venous stasis, Type 2 diabetes, controlled, with neuropathy (H)       ascorbic acid 500 MG Tabs     30 tablet    Take 1 tablet (500 mg) by mouth 2 times daily    Ulcer of right lower leg, with fat layer exposed (H)       ASPIRIN PO      Take 81 mg by mouth daily        blood glucose monitoring lancets     3 Box    Use to test blood sugars 2 as directed.    Type 2 diabetes, uncontrolled, with neuropathy (H)       * clopidogrel 75 MG tablet    PLAVIX    30 tablet    Take 1 tablet (75 mg) by mouth daily    Peripheral vascular disease, unspecified       * clopidogrel 75 MG tablet    PLAVIX    30 tablet    Take 1 tablet (75 mg) by mouth daily    Peripheral vascular disease (H)       ferrous sulfate 325 (65 Fe) MG tablet    IRON    60 tablet    Take 1 tablet (325 mg) by mouth 2 times daily    Peripheral vascular disease, unspecified       gentamicin 0.1 % cream    GARAMYCIN    30 g    Apply topically daily To right leg ulcer.    Ulcer of right lower leg, with fat layer exposed (H), Chronic venous hypertension with ulcer involving right side (H), Type 2  "diabetes, controlled, with neuropathy (H)       insulin glargine 100 UNIT/ML injection    LANTUS SOLOSTAR    3 mL    Inject 23 Units Subcutaneous daily (with dinner) May take up to 25 units daily    Type 2 diabetes mellitus with diabetic peripheral angiopathy without gangrene, with long-term current use of insulin (H)       insulin pen needle 31G X 8 MM    B-D U/F    100 each    Use 1 daily o as directed    Diabetes mellitus, type II (H)       LISINOPRIL PO      Take 20 mg by mouth 2 times daily        * nateglinide 120 MG tablet    STARLIX    90 tablet    TAKE 1 TABLET BY MOUTH THREE TIMES DAILY BEFORE MEALS    Type 2 diabetes, controlled, with neuropathy (H)       * nateglinide 120 MG tablet    STARLIX    90 tablet    Take 1 tablet (120 mg) by mouth 3 times daily (before meals)    Diabetes mellitus (H)       ONETOUCH ULTRA test strip   Generic drug:  blood glucose monitoring     200 strip    USE TO TEST TWICE DAILY OR AS DIRECTED    Type 2 diabetes mellitus (H)       OPTIFOAM 6\"X6\" Pads     1 each    1 Box once a week    Ulcer of right leg, with fat layer exposed (H)       order for DME     2 each    Please measure and distribute 1 pair of 20mm Hg - 30mm Hg knee high ULCER CARE open or closed toe compression stockings.  Patient has a size 13 foot and please take this into consideration.  Jobst or equivalent    Varicose veins of lower extremities with other complications, Venous stasis ulcer of right lower extremity (H)       order for DME     3 each    Please measure and distribute 1 pair of 20mmHg - 30mmHg knee high open or closed toe compression stockings. Jobst ultrasheer or equivalent.    Varicose veins of both lower extremities with complications       order for DME     2 each    Please measure and distribute 1 pair of 30mmHg - 40mmHg knee high open toe ulcercare compression stockings. Jobst ultrasheer or equivalent.    Varicose veins of bilateral lower extremities with other complications       sildenafil 50 " MG tablet    VIAGRA    10 tablet    Take 1 tablet (50 mg) by mouth daily as needed for erectile dysfunction    Vasculogenic erectile dysfunction, unspecified vasculogenic erectile dysfunction type       silver sulfADIAZINE 1 % cream    SILVADENE    85 g    Apply topically daily To affected areas on right foot and leg.    Ulcer of right lower leg, with fat layer exposed (H), Chronic venous hypertension with ulcer involving right side (H), Type 2 diabetes, controlled, with neuropathy (H)       simvastatin 10 MG tablet    ZOCOR    90 tablet    Take 1 tablet (10 mg) by mouth At Bedtime    Type 2 diabetes, controlled, with neuropathy (H)       triamcinolone 0.1 % cream    KENALOG    30 g    Apply sparingly to left heel daily.    Dermatitis of left foot       VITAMIN C PO      Take 1,000 mg by mouth        VITAMIN D (CHOLECALCIFEROL) PO      Take 1,000 Units by mouth 2 times daily        * Notice:  This list has 4 medication(s) that are the same as other medications prescribed for you. Read the directions carefully, and ask your doctor or other care provider to review them with you.       Home

## 2018-07-10 ENCOUNTER — OFFICE VISIT (OUTPATIENT)
Dept: PODIATRY | Facility: CLINIC | Age: 73
End: 2018-07-10
Payer: MEDICARE

## 2018-07-10 VITALS
OXYGEN SATURATION: 98 % | HEART RATE: 108 BPM | TEMPERATURE: 100 F | DIASTOLIC BLOOD PRESSURE: 56 MMHG | SYSTOLIC BLOOD PRESSURE: 108 MMHG

## 2018-07-10 DIAGNOSIS — E11.51 DIABETES MELLITUS WITH PERIPHERAL VASCULAR DISEASE (H): ICD-10-CM

## 2018-07-10 DIAGNOSIS — E11.49 TYPE II OR UNSPECIFIED TYPE DIABETES MELLITUS WITH NEUROLOGICAL MANIFESTATIONS, NOT STATED AS UNCONTROLLED(250.60) (H): ICD-10-CM

## 2018-07-10 DIAGNOSIS — L97.521 SKIN ULCER OF LEFT FOOT, LIMITED TO BREAKDOWN OF SKIN (H): ICD-10-CM

## 2018-07-10 PROCEDURE — 99213 OFFICE O/P EST LOW 20 MIN: CPT | Performed by: PODIATRIST

## 2018-07-10 RX ORDER — CEFADROXIL 500 MG/1
500 CAPSULE ORAL 2 TIMES DAILY
Qty: 28 CAPSULE | Refills: 0 | Status: SHIPPED | OUTPATIENT
Start: 2018-07-10 | End: 2018-07-24
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
## 2018-07-10 NOTE — NURSING NOTE
Amos Walker's chief complaint for this visit includes:  Chief Complaint   Patient presents with     RECHECK     wound check      PCP: Racheal Swift    Referring Provider:  No referring provider defined for this encounter.    /56  Pulse 108  Temp 100  F (37.8  C) (Oral)  SpO2 98%  Data Unavailable     Do you need any medication refills at today's visit? no

## 2018-07-10 NOTE — MR AVS SNAPSHOT
After Visit Summary   7/10/2018    Amos Walker    MRN: [id number]           Patient Information     Date Of Birth          1945        Visit Information        Provider Department      7/10/2018 1:45 PM Brayan Mcclain DPM [facility]        Today's Diagnoses     Skin ulcer of left foot, limited to breakdown of skin (H)        Type II or unspecified type diabetes mellitus with neurological manifestations, not stated as uncontrolled(250.60) (H)        Diabetes mellitus with peripheral vascular disease (H)           Follow-ups after your visit        Your next 10 appointments already scheduled     Jul 12, 2018  1:00 PM CDT   Cardiac Discharge with  Cardiac Rehab 1   [facility] [place], Cardiac Rehabilitation ([facility], [facility])    [address] 1st [address] 19  [facility] [address]   [phone]            Jul 17, 2018  9:30 AM CDT   Return Visit with Brayan Mcclain DPM   [facility] ([facility]    [address] [facility] [address]   [phone]            Jul 24, 2018  9:30 AM CDT   Return Visit with Brayan Mcclain DPM   [facility]    [address] [facility] [address]   [phone]            Jul 31, 2018  3:00 PM CDT   Return Visit with Brayan Mcclain DPM   [facility] ([facility])    [address] [facility] [address]   [phone]            Aug 14, 2018  2:30 PM CDT   Return Visit with Brayan Mcclain DPM   [facility] ([facility])    [address] [facility] [address]   [phone]            Aug 15, 2018 12:45 PM CDT   (Arrive by 12:30 PM)   RETURN HIP with Ish Jackman MD   [facility] ([facility])    [address] [facility]  [address] [facility]  [address] [phone]            Aug 16, 2018  8:00 AM CDT   (Arrive by 7:45 AM)   Hearing Aid Evaluation with Eddie Jane   [facility] ([facility] [place])    [address] [id number] [phone]           Please see your medical professional for ear cleaning prior to this appointment if you believe wax buildup may be an issue. All patients are required to have a physician's order stating the medical reason for the hearing test. Your doctor can send an electronic order, use their own form or we have provided a form (called Physician's Order for Audiology Services). It states that there is a medical reason for your exam. Without an order you may need to be rescheduled until the order can be obtained.            Aug 21, 2018  2:30 PM CDT   Return Visit with Brayan Mcclain DPM   [facility]    [address] [place] [id number] [phone]            Aug 28, 2018  2:30 PM CDT   Return Visit with Brayan Mcclain DPM   [facility] ([facility])    [address] [place] [id number] [phone]            Sep 19, 2018   Procedure with Rickie Gautam MD   [facility] End[facility] ([facility], [facility])    [address] [facility] [address] [phone]           The [facility] is located on the corner of [facility] and [facility] on the [facility]. It is easily accessible from virtually any point in the [facility] area, via [address].              Who to contact     If you have questions or need follow up information about today's clinic visit or your schedule please contact [facility] directly at [phone].  Normal or non-critical lab and imaging results will be communicated to you by MyChart, letter or phone  within 4 business days after the clinic has received the results. If you do not hear from us within 7 days, please contact the clinic through [facility] or phone. If you have a critical or abnormal lab result, we will notify you by phone as soon as possible.  Submit refill requests through [facility] or call your pharmacy and they will forward the refill request to us. Please allow 3 business days for your refill to be completed.          Additional Information About Your Visit        [facility] Information     [facility] gives you secure access to your electronic health record. If you see a primary care provider, you can also send messages to your care team and make appointments. If you have questions, please call your primary care clinic.  If you do not have a primary care provider, please call [phone] and they will assist you.      [facility] is an electronic gateway that provides easy, online access to your medical records. With [facility], you can request a clinic appointment, read your test results, renew a prescription or communicate with your care team.     To access your existing account, please contact your [facility] or call [phone] for assistance.        Care EveryWhere ID     This is your Care EveryWhere ID. This could be used by other organizations to access your [facility] medical records  [id number]        Your Vitals Were     Pulse Temperature Pulse Oximetry             108 100  F (37.8  C) (Oral) 98%          Blood Pressure from Last 3 Encounters:   07/10/18 108/56   06/29/18 95/55   06/19/18 133/67    Weight from Last 3 Encounters:   06/29/18 76.7 kg (169 lb 1.6 oz)   06/19/18 76.7 kg (169 lb)   06/08/18 76.7 kg (169 lb)              Today, you had the following     No orders found for display         Today's Medication Changes          These changes are accurate as of 7/10/18 11:59 PM.  If you have any questions, ask your nurse or doctor.               These medicines have  changed or have updated prescriptions.        Dose/Directions    * clopidogrel 75 MG tablet   Commonly known as:  PLAVIX   This may have changed:  when to take this   Used for:  Peripheral vascular disease, unspecified        Dose:  75 mg   Take 1 tablet (75 mg) by mouth daily   Quantity:  30 tablet   Refills:  11       * clopidogrel 75 MG tablet   Commonly known as:  PLAVIX   This may have changed:  Another medication with the same name was changed. Make sure you understand how and when to take each.   Used for:  Peripheral vascular disease (H)        Dose:  75 mg   Take 1 tablet (75 mg) by mouth daily   Quantity:  30 tablet   Refills:  3       gentamicin 0.1 % cream   Commonly known as:  GARAMYCIN   This may have changed:    - when to take this  - reasons to take this  - additional instructions   Used for:  Ulcer of right lower leg, with fat layer exposed (H), Chronic venous hypertension with ulcer involving right side (H), Type 2 diabetes, controlled, with neuropathy (H)        Apply topically daily To right leg ulcer.   Quantity:  30 g   Refills:  5       * Notice:  This list has 2 medication(s) that are the same as other medications prescribed for you. Read the directions carefully, and ask your doctor or other care provider to review them with you.         Where to get your medicines      These medications were sent to [facility] - [place], [place] - [address]  [address], [facility] [address]     Phone:  [phone]     cefadroxil 500 MG capsule                Primary Care Provider Office Phone # Fax #    Racheal Swift [phone]       [address] [address]        Equal Access to Services     [facility]: Nataliia Bedoya, wajunitoda luroxanna, qaybta yolanda trevino. So [facility] [phone].    ATENCIÓN: Si pancho wagner, raul a rodrigues disposición  servicios gratuitos de asistencia lingüística. Mary sparrow [phone].    We comply with applicable federal civil rights laws and [place] laws. We do not discriminate on the basis of race, color, national origin, age, disability, sex, sexual orientation, or gender identity.            Thank you!     Thank you for choosing [facility]  for your care. Our goal is always to provide you with excellent care. Hearing back from our patients is one way we can continue to improve our services. Please take a few minutes to complete the written survey that you may receive in the mail after your visit with us. Thank you!             Your Updated Medication List - Protect others around you: Learn how to safely use, store and throw away your medicines at www.disposemymeds.org.          This list is accurate as of 7/10/18 11:59 PM.  Always use your most recent med list.                   Brand Name Dispense Instructions for use Diagnosis    ammonium lactate 12 % cream    LAC-HYDRIN    385 g    Apply topically 2 times daily as needed for dry skin    Venous stasis, Type 2 diabetes, controlled, with neuropathy (H)       ascorbic acid 500 MG Tabs     30 tablet    Take 1 tablet (500 mg) by mouth 2 times daily    Ulcer of right lower leg, with fat layer exposed (H)       ASPIRIN PO      Take 81 mg by mouth daily        blood glucose monitoring lancets     3 Box    Use to test blood sugars 2 as directed.    Type 2 diabetes, uncontrolled, with neuropathy (H)       Blood Pressure Kit     1 kit    1 Device daily    Benign essential hypertension       cefadroxil 500 MG capsule    DURICEF    28 capsule    Take 1 capsule (500 mg) by mouth 2 times daily    Skin ulcer of left foot, limited to breakdown of skin (H), Type II or unspecified type diabetes mellitus with neurological manifestations, not stated as uncontrolled(250.60) (H), Diabetes mellitus with peripheral vascular disease (H)       * clopidogrel 75 MG tablet     "PLAVIX    30 tablet    Take 1 tablet (75 mg) by mouth daily    Peripheral vascular disease, unspecified       * clopidogrel 75 MG tablet    PLAVIX    30 tablet    Take 1 tablet (75 mg) by mouth daily    Peripheral vascular disease (H)       ferrous sulfate 325 (65 Fe) MG tablet    IRON    60 tablet    Take 1 tablet (325 mg) by mouth 2 times daily    Peripheral vascular disease, unspecified       gentamicin 0.1 % cream    GARAMYCIN    30 g    Apply topically daily To right leg ulcer.    Ulcer of right lower leg, with fat layer exposed (H), Chronic venous hypertension with ulcer involving right side (H), Type 2 diabetes, controlled, with neuropathy (H)       insulin glargine 100 UNIT/ML injection    LANTUS SOLOSTAR    3 mL    Inject 16 Units Subcutaneous daily (with dinner) May take up to 22 units daily    Type 2 diabetes mellitus with diabetic peripheral angiopathy without gangrene, with long-term current use of insulin (H)       * insulin pen needle 31G X 8 MM    B-D U/F    100 each    Use 1 daily o as directed    Diabetes mellitus, type II (H)       * B-D U/F 31G X 5 MM   Generic drug:  insulin pen needle      USE ONCE D OR UTD        lisinopril 10 MG tablet    PRINIVIL/ZESTRIL    90 tablet    Take 1 tablet (10 mg) by mouth daily    Benign essential hypertension       * nateglinide 120 MG tablet    STARLIX    90 tablet    TAKE 1 TABLET BY MOUTH THREE TIMES DAILY BEFORE MEALS    Type 2 diabetes, controlled, with neuropathy (H)       * nateglinide 120 MG tablet    STARLIX    90 tablet    Take 1 tablet (120 mg) by mouth 3 times daily (before meals)    Diabetes mellitus (H)       ONETOUCH ULTRA test strip   Generic drug:  blood glucose monitoring     200 strip    USE TO TEST TWICE DAILY OR AS DIRECTED    Type 2 diabetes mellitus (H)       OPTIFOAM 6\"X6\" Pads     1 each    1 Box once a week    Ulcer of right leg, with fat layer exposed (H)       order for DME     2 each    Please measure and distribute 1 pair of 20mm Hg " - 30mm Hg knee high ULCER CARE open or closed toe compression stockings.  Patient has a size 13 foot and please take this into consideration.  Jobst or equivalent    Varicose veins of lower extremities with other complications, Venous stasis ulcer of right lower extremity (H)       order for DME     3 each    Please measure and distribute 1 pair of 20mmHg - 30mmHg knee high open or closed toe compression stockings. Jobst ultrasheer or equivalent.    Varicose veins of both lower extremities with complications       order for DME     2 each    Please measure and distribute 1 pair of 30mmHg - 40mmHg knee high open toe ulcercare compression stockings. Jobst ultrasheer or equivalent.    Varicose veins of bilateral lower extremities with other complications       sildenafil 50 MG tablet    VIAGRA    10 tablet    Take 1 tablet (50 mg) by mouth daily as needed for erectile dysfunction    Vasculogenic erectile dysfunction, unspecified vasculogenic erectile dysfunction type       silver sulfADIAZINE 1 % cream    SILVADENE    85 g    Apply topically daily To affected areas on right foot and leg.    Ulcer of right lower leg, with fat layer exposed (H), Chronic venous hypertension with ulcer involving right side (H), Type 2 diabetes, controlled, with neuropathy (H)       simvastatin 10 MG tablet    ZOCOR    90 tablet    Take 1 tablet (10 mg) by mouth At Bedtime    Type 2 diabetes, controlled, with neuropathy (H)       triamcinolone 0.1 % cream    KENALOG    30 g    Apply sparingly to left heel daily.    Dermatitis of left foot       VITAMIN C PO      Take 1,000 mg by mouth        VITAMIN D (CHOLECALCIFEROL) PO      Take 1,000 Units by mouth 2 times daily        * Notice:  This list has 6 medication(s) that are the same as other medications prescribed for you. Read the directions carefully, and ask your doctor or other care provider to review them with you.
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
## 2018-07-11 ENCOUNTER — TELEPHONE (OUTPATIENT)
Dept: PODIATRY | Facility: CLINIC | Age: 73
End: 2018-07-11

## 2018-07-11 NOTE — TELEPHONE ENCOUNTER
Patient is calling to request an order for an Xray for his foot. Patient would like a call once order is placed.  Please advise.

## 2018-07-11 NOTE — TELEPHONE ENCOUNTER
Reason for Call:  Other / Question regarding XRay Order    Detailed comments: Patient called and stated an order was placed for X rays of his foot and he would like to have X Rays done at [facility].  Patient would like to get a call back to clarify whether he can have this done at that location and if he needs a specific order placed.  Please call patient back to discuss.    Phone Number Patient can be reached at: Cell number on file:    Telephone Information:   Mobile [phone]       Best Time: Anytime    Can we leave a detailed message on this number? YES    Call taken on 7/11/2018 at 4:22 PM by Sarah Arzola

## 2018-07-12 NOTE — TELEPHONE ENCOUNTER
Order placed by Dr. Mcclain, called patient to let him know, he will schedule the XR before his next appointment.  Ashley Ellison RN

## 2018-07-13 ENCOUNTER — RADIANT APPOINTMENT (OUTPATIENT)
Dept: GENERAL RADIOLOGY | Facility: CLINIC | Age: 73
End: 2018-07-13
Attending: PODIATRIST
Payer: MEDICARE

## 2018-07-13 PROCEDURE — 73630 X-RAY EXAM OF FOOT: CPT | Mod: RT

## 2018-07-16 DIAGNOSIS — E11.51 TYPE 2 DIABETES MELLITUS WITH DIABETIC PERIPHERAL ANGIOPATHY WITHOUT GANGRENE, WITH LONG-TERM CURRENT USE OF INSULIN (H): ICD-10-CM

## 2018-07-16 DIAGNOSIS — Z79.4 TYPE 2 DIABETES MELLITUS WITH DIABETIC PERIPHERAL ANGIOPATHY WITHOUT GANGRENE, WITH LONG-TERM CURRENT USE OF INSULIN (H): ICD-10-CM

## 2018-07-17 ENCOUNTER — OFFICE VISIT (OUTPATIENT)
Dept: PODIATRY | Facility: CLINIC | Age: 73
End: 2018-07-17
Payer: MEDICARE

## 2018-07-17 VITALS
SYSTOLIC BLOOD PRESSURE: 129 MMHG | HEART RATE: 117 BPM | OXYGEN SATURATION: 98 % | DIASTOLIC BLOOD PRESSURE: 59 MMHG | TEMPERATURE: 97.4 F

## 2018-07-17 DIAGNOSIS — L97.912 ULCER OF RIGHT LOWER EXTREMITY WITH FAT LAYER EXPOSED (H): ICD-10-CM

## 2018-07-17 DIAGNOSIS — E11.49 TYPE II OR UNSPECIFIED TYPE DIABETES MELLITUS WITH NEUROLOGICAL MANIFESTATIONS, NOT STATED AS UNCONTROLLED(250.60) (H): ICD-10-CM

## 2018-07-17 DIAGNOSIS — L97.512 SKIN ULCER OF RIGHT FOOT WITH FAT LAYER EXPOSED (H): Primary | ICD-10-CM

## 2018-07-17 DIAGNOSIS — E11.51 DIABETES MELLITUS WITH PERIPHERAL VASCULAR DISEASE (H): ICD-10-CM

## 2018-07-17 LAB
GRAM STN SPEC: NORMAL
GRAM STN SPEC: NORMAL
Lab: NORMAL
SPECIMEN SOURCE: NORMAL

## 2018-07-17 PROCEDURE — 87077 CULTURE AEROBIC IDENTIFY: CPT | Performed by: PODIATRIST

## 2018-07-17 PROCEDURE — 97597 DBRDMT OPN WND 1ST 20 CM/<: CPT | Performed by: PODIATRIST

## 2018-07-17 PROCEDURE — 87070 CULTURE OTHR SPECIMN AEROBIC: CPT | Performed by: PODIATRIST

## 2018-07-17 PROCEDURE — 87186 SC STD MICRODIL/AGAR DIL: CPT | Performed by: PODIATRIST

## 2018-07-17 PROCEDURE — 87205 SMEAR GRAM STAIN: CPT | Performed by: PODIATRIST

## 2018-07-17 PROCEDURE — 87075 CULTR BACTERIA EXCEPT BLOOD: CPT | Performed by: PODIATRIST

## 2018-07-17 RX ORDER — INSULIN GLARGINE 100 [IU]/ML
INJECTION, SOLUTION SUBCUTANEOUS
Qty: 15 ML | Refills: 0 | OUTPATIENT
Start: 2018-07-17
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
## 2018-07-17 NOTE — MR AVS SNAPSHOT
After Visit Summary   7/17/2018    Amos Walker    MRN: [id number]           Patient Information     Date Of Birth          1945        Visit Information        Provider Department      7/17/2018 9:30 AM Brayan Mcclain DPM [facility]        Today's Diagnoses     Skin ulcer of right foot with fat layer exposed (H)    -  1    Ulcer of right lower extremity with fat layer exposed (H)        Type II or unspecified type diabetes mellitus with neurological manifestations, not stated as uncontrolled(250.60) (H)        Diabetes mellitus with peripheral vascular disease (H)          Care Instructions    Thanks for coming today.  [facility] Medicine Clinic  [address] [place] [address]    To schedule future appointments in Ortho Clinic, you may call [phone].    To schedule ordered imaging by your Provider: Call [facility] at [phone]    [email or web address] available online at:   [email or web address]    Please call if any further questions or concerns [phone] and ask for the Orthopedic Department. Clinic hours 8 am to 5 pm.    Return to clinic if symptoms worsen.            Follow-ups after your visit        Your next 10 appointments already scheduled     Jul 24, 2018  9:30 AM CDT   Return Visit with Brayan Mcclain DPM   [facility])    [address] [place] [id number] [phone]            Jul 31, 2018  3:00 PM CDT   Return Visit with Brayan Mcclain DPM   [facility])    [address] [facility] [id number] [phone]            Aug 14, 2018  2:30 PM CDT   Return Visit with Brayan Mcclain DPM   [facility])    [address] [facility] [id number] [phone]            Aug 15, 2018 12:45 PM CDT   (Arrive by 12:30 PM)   RETURN HIP with Ish Jackman MD    [facility] ([facility])    [address] [facility]  [address] [facility] [address] [phone]            Aug 16, 2018  8:00 AM CDT   (Arrive by 7:45 AM)   Hearing Aid Evaluation with Eddie Jane   [facility] ([facility])    [address] [id number] [phone]           Please see your medical professional for ear cleaning prior to this appointment if you believe wax buildup may be an issue. All patients are required to have a physician's order stating the medical reason for the hearing test. Your doctor can send an electronic order, use their own form or we have provided a form (called Physician's Order for Audiology Services). It states that there is a medical reason for your exam. Without an order you may need to be rescheduled until the order can be obtained.            Aug 21, 2018  2:30 PM CDT   Return Visit with Brayan Mcclain DPM   [facility])    [address] [facility] [address]   [phone]            Aug 28, 2018  2:30 PM CDT   Return Visit with Brayan Mcclain DPM   [facility] ([facility])    [address] [facility] [address]   [phone]            Sep 04, 2018  9:30 AM CDT   Return Visit with Brayan Mcclain DPM   [facility] ([facility])    [address] [facility] [address]   [phone]            Sep 11, 2018  9:30 AM CDT   Return Visit with Brayan Mcclain DPM   [facility] ([facility])    [address] [facility] [address]   [phone]            Sep 18, 2018  9:30 AM CDT   Return Visit with Brayan Mcclain DPM   [facility] ([facility])    [address] [facility] [address]   [phone]              Who  to contact     If you have questions or need follow up information about today's clinic visit or your schedule please contact [facility] directly at [phone].  Normal or non-critical lab and imaging results will be communicated to you by Alma Johnshart, letter or phone within 4 business days after the clinic has received the results. If you do not hear from us within 7 days, please contact the clinic through Alma Johnshart or phone. If you have a critical or abnormal lab result, we will notify you by phone as soon as possible.  Submit refill requests through TheJobPost or call your pharmacy and they will forward the refill request to us. Please allow 3 business days for your refill to be completed.          Additional Information About Your Visit        TheJobPost Information     TheJobPost gives you secure access to your electronic health record. If you see a primary care provider, you can also send messages to your care team and make appointments. If you have questions, please call your primary care clinic.  If you do not have a primary care provider, please call [phone] and they will assist you.      The[employer]Post is an electronic gateway that provides easy, online access to your medical records. With TheJobPost, you can request a clinic appointment, read your test results, renew a prescription or communicate with your care team.     To access your existing account, please contact your [facility] or call [phone] for assistance.        Care EveryWhere ID     This is your Care EveryWhere ID. This could be used by other organizations to access your [facility] medical records  [id number]        Your Vitals Were     Pulse Temperature Pulse Oximetry             117 97.4  F (36.3  C) (Oral) 98%          Blood Pressure from Last 3 Encounters:   07/17/18 129/59   07/10/18 108/56   06/29/18 95/55    Weight from Last 3 Encounters:   06/29/18 76.7 kg (169 lb 1.6 oz)   06/19/18 76.7 kg (169 lb)    06/08/18 76.7 kg (169 lb)              We Performed the Following     Anaerobic bacterial culture     DEBRIDEMENT WOUND UP TO 20 SQ CM     Gram stain     Wound Culture Aerobic Bacterial          Today's Medication Changes          These changes are accurate as of 7/17/18 12:11 PM.  If you have any questions, ask your nurse or doctor.               These medicines have changed or have updated prescriptions.        Dose/Directions    * clopidogrel 75 MG tablet   Commonly known as:  PLAVIX   This may have changed:  when to take this   Used for:  Peripheral vascular disease, unspecified        Dose:  75 mg   Take 1 tablet (75 mg) by mouth daily   Quantity:  30 tablet   Refills:  11       * clopidogrel 75 MG tablet   Commonly known as:  PLAVIX   This may have changed:  Another medication with the same name was changed. Make sure you understand how and when to take each.   Used for:  Peripheral vascular disease (H)        Dose:  75 mg   Take 1 tablet (75 mg) by mouth daily   Quantity:  30 tablet   Refills:  3       gentamicin 0.1 % cream   Commonly known as:  GARAMYCIN   This may have changed:    - when to take this  - reasons to take this  - additional instructions   Used for:  Ulcer of right lower leg, with fat layer exposed (H), Chronic venous hypertension with ulcer involving right side (H), Type 2 diabetes, controlled, with neuropathy (H)        Apply topically daily To right leg ulcer.   Quantity:  30 g   Refills:  5       * Notice:  This list has 2 medication(s) that are the same as other medications prescribed for you. Read the directions carefully, and ask your doctor or other care provider to review them with you.             Primary Care Provider Office Phone # Fax #    Racheal Swift [phone]       [address] [address]        Equal Access to Services     SAMSON MELTON AH: miguel Pineda, yolanda palma  beto destineydamion cherylmichelle laMickaakristie ah. So [facility] [phone].    ATENCIÓN: Si evelinala bernard, tiene a rodrigues disposición servicios gratuitos de asistencia lingüística. Mary sparrow [phone].    We comply with applicable federal civil rights laws and [place] laws. We do not discriminate on the basis of race, color, national origin, age, disability, sex, sexual orientation, or gender identity.            Thank you!     Thank you for choosing [facility]  for your care. Our goal is always to provide you with excellent care. Hearing back from our patients is one way we can continue to improve our services. Please take a few minutes to complete the written survey that you may receive in the mail after your visit with us. Thank you!             Your Updated Medication List - Protect others around you: Learn how to safely use, store and throw away your medicines at www.disposemymeds.org.          This list is accurate as of 7/17/18 12:11 PM.  Always use your most recent med list.                   Brand Name Dispense Instructions for use Diagnosis    ammonium lactate 12 % cream    LAC-HYDRIN    385 g    Apply topically 2 times daily as needed for dry skin    Venous stasis, Type 2 diabetes, controlled, with neuropathy (H)       ascorbic acid 500 MG Tabs     30 tablet    Take 1 tablet (500 mg) by mouth 2 times daily    Ulcer of right lower leg, with fat layer exposed (H)       ASPIRIN PO      Take 81 mg by mouth daily        blood glucose monitoring lancets     3 Box    Use to test blood sugars 2 as directed.    Type 2 diabetes, uncontrolled, with neuropathy (H)       Blood Pressure Kit     1 kit    1 Device daily    Benign essential hypertension       cefadroxil 500 MG capsule    DURICEF    28 capsule    Take 1 capsule (500 mg) by mouth 2 times daily    Skin ulcer of left foot, limited to breakdown of skin (H), Type II or unspecified type diabetes mellitus with neurological manifestations, not stated as  "uncontrolled(250.60) (H), Diabetes mellitus with peripheral vascular disease (H)       * clopidogrel 75 MG tablet    PLAVIX    30 tablet    Take 1 tablet (75 mg) by mouth daily    Peripheral vascular disease, unspecified       * clopidogrel 75 MG tablet    PLAVIX    30 tablet    Take 1 tablet (75 mg) by mouth daily    Peripheral vascular disease (H)       ferrous sulfate 325 (65 Fe) MG tablet    IRON    60 tablet    Take 1 tablet (325 mg) by mouth 2 times daily    Peripheral vascular disease, unspecified       gentamicin 0.1 % cream    GARAMYCIN    30 g    Apply topically daily To right leg ulcer.    Ulcer of right lower leg, with fat layer exposed (H), Chronic venous hypertension with ulcer involving right side (H), Type 2 diabetes, controlled, with neuropathy (H)       insulin glargine 100 UNIT/ML injection    LANTUS SOLOSTAR    3 mL    Inject 16 Units Subcutaneous daily (with dinner) May take up to 22 units daily    Type 2 diabetes mellitus with diabetic peripheral angiopathy without gangrene, with long-term current use of insulin (H)       * insulin pen needle 31G X 8 MM    B-D U/F    100 each    Use 1 daily o as directed    Diabetes mellitus, type II (H)       * B-D U/F 31G X 5 MM   Generic drug:  insulin pen needle      USE ONCE D OR UTD        lisinopril 10 MG tablet    PRINIVIL/ZESTRIL    90 tablet    Take 1 tablet (10 mg) by mouth daily    Benign essential hypertension       * nateglinide 120 MG tablet    STARLIX    90 tablet    TAKE 1 TABLET BY MOUTH THREE TIMES DAILY BEFORE MEALS    Type 2 diabetes, controlled, with neuropathy (H)       * nateglinide 120 MG tablet    STARLIX    90 tablet    Take 1 tablet (120 mg) by mouth 3 times daily (before meals)    Diabetes mellitus (H)       ONETOUCH ULTRA test strip   Generic drug:  blood glucose monitoring     200 strip    USE TO TEST TWICE DAILY OR AS DIRECTED    Type 2 diabetes mellitus (H)       OPTIFOAM 6\"X6\" Pads     1 each    1 Box once a week    Ulcer of " right leg, with fat layer exposed (H)       order for DME     2 each    Please measure and distribute 1 pair of 20mm Hg - 30mm Hg knee high ULCER CARE open or closed toe compression stockings.  Patient has a size 13 foot and please take this into consideration.  Jobst or equivalent    Varicose veins of lower extremities with other complications, Venous stasis ulcer of right lower extremity (H)       order for DME     3 each    Please measure and distribute 1 pair of 20mmHg - 30mmHg knee high open or closed toe compression stockings. Jobst ultrasheer or equivalent.    Varicose veins of both lower extremities with complications       order for DME     2 each    Please measure and distribute 1 pair of 30mmHg - 40mmHg knee high open toe ulcercare compression stockings. Jobst ultrasheer or equivalent.    Varicose veins of bilateral lower extremities with other complications       sildenafil 50 MG tablet    VIAGRA    10 tablet    Take 1 tablet (50 mg) by mouth daily as needed for erectile dysfunction    Vasculogenic erectile dysfunction, unspecified vasculogenic erectile dysfunction type       silver sulfADIAZINE 1 % cream    SILVADENE    85 g    Apply topically daily To affected areas on right foot and leg.    Ulcer of right lower leg, with fat layer exposed (H), Chronic venous hypertension with ulcer involving right side (H), Type 2 diabetes, controlled, with neuropathy (H)       simvastatin 10 MG tablet    ZOCOR    90 tablet    Take 1 tablet (10 mg) by mouth At Bedtime    Type 2 diabetes, controlled, with neuropathy (H)       triamcinolone 0.1 % cream    KENALOG    30 g    Apply sparingly to left heel daily.    Dermatitis of left foot       VITAMIN C PO      Take 1,000 mg by mouth        VITAMIN D (CHOLECALCIFEROL) PO      Take 1,000 Units by mouth 2 times daily        * Notice:  This list has 6 medication(s) that are the same as other medications prescribed for you. Read the directions carefully, and ask your doctor  or other care provider to review them with you.

## 2018-07-17 NOTE — PATIENT INSTRUCTIONS
Thanks for coming today.  Ortho[facility] Medicine Clinic  [address] [place] [address]    To schedule future appointments in Ortho Clinic, you may call [phone].    To schedule ordered imaging by your Provider: Call [facility] at [phone]    [email or web address] available online at:   [email or web address]    Please call if any further questions or concerns [phone] and ask for the Orthopedic Department. Clinic hours 8 am to 5 pm.    Return to clinic if symptoms worsen.

## 2018-07-19 LAB
BACTERIA SPEC CULT: NO GROWTH
Lab: NORMAL
SPECIMEN SOURCE: NORMAL

## 2018-07-20 DIAGNOSIS — L98.499 NON-HEALING ULCER (H): ICD-10-CM

## 2018-07-20 DIAGNOSIS — I99.8 ISCHEMIA OF BOTH LOWER EXTREMITIES: Primary | ICD-10-CM

## 2018-07-23 DIAGNOSIS — E11.40 TYPE 2 DIABETES, CONTROLLED, WITH NEUROPATHY (H): ICD-10-CM

## 2018-07-23 RX ORDER — SIMVASTATIN 10 MG
10 TABLET ORAL AT BEDTIME
Qty: 90 TABLET | Refills: 3 | Status: SHIPPED | OUTPATIENT
Start: 2018-07-23 | End: 2019-02-01 | Stop reason: ALTCHOICE

## 2018-07-24 ENCOUNTER — OFFICE VISIT (OUTPATIENT)
Dept: PODIATRY | Facility: CLINIC | Age: 73
End: 2018-07-24
Payer: MEDICARE

## 2018-07-24 VITALS
TEMPERATURE: 99.1 F | OXYGEN SATURATION: 100 % | SYSTOLIC BLOOD PRESSURE: 126 MMHG | DIASTOLIC BLOOD PRESSURE: 61 MMHG | HEART RATE: 108 BPM

## 2018-07-24 DIAGNOSIS — E11.51 DIABETES MELLITUS WITH PERIPHERAL VASCULAR DISEASE (H): ICD-10-CM

## 2018-07-24 DIAGNOSIS — E11.40 TYPE 2 DIABETES, CONTROLLED, WITH NEUROPATHY (H): ICD-10-CM

## 2018-07-24 DIAGNOSIS — L97.521 SKIN ULCER OF LEFT FOOT, LIMITED TO BREAKDOWN OF SKIN (H): ICD-10-CM

## 2018-07-24 DIAGNOSIS — L97.512 SKIN ULCER OF RIGHT FOOT WITH FAT LAYER EXPOSED (H): Primary | ICD-10-CM

## 2018-07-24 DIAGNOSIS — I87.8 VENOUS STASIS: ICD-10-CM

## 2018-07-24 DIAGNOSIS — E11.49 TYPE II OR UNSPECIFIED TYPE DIABETES MELLITUS WITH NEUROLOGICAL MANIFESTATIONS, NOT STATED AS UNCONTROLLED(250.60) (H): ICD-10-CM

## 2018-07-24 LAB
BACTERIA SPEC CULT: ABNORMAL
BACTERIA SPEC CULT: ABNORMAL
Lab: ABNORMAL
SPECIMEN SOURCE: ABNORMAL

## 2018-07-24 PROCEDURE — 99213 OFFICE O/P EST LOW 20 MIN: CPT | Performed by: PODIATRIST

## 2018-07-24 RX ORDER — AMMONIUM LACTATE 12 G/100G
CREAM TOPICAL 2 TIMES DAILY PRN
Qty: 385 G | Refills: 3 | Status: SHIPPED | OUTPATIENT
Start: 2018-07-24 | End: 2018-11-21

## 2018-07-24 RX ORDER — CEFADROXIL 500 MG/1
500 CAPSULE ORAL 2 TIMES DAILY
Qty: 20 CAPSULE | Refills: 0 | Status: SHIPPED | OUTPATIENT
Start: 2018-07-24 | End: 2018-09-12

## 2018-07-24 ASSESSMENT — 6 MINUTE WALK TEST (6MWT)
TOTAL DISTANCE WALKED (FT): 1156
GENDER SELECTION: MALE

## 2018-07-24 NOTE — MR AVS SNAPSHOT
After Visit Summary   7/24/2018    Amos Walker    MRN: [id number]           Patient Information     Date Of Birth          1945        Visit Information        Provider Department      7/24/2018 9:30 AM Brayan Mcclain DPM [facility]        Today's Diagnoses     Skin ulcer of right foot with fat layer exposed (H)    -  1    Type II or unspecified type diabetes mellitus with neurological manifestations, not stated as uncontrolled(250.60) (H)        Diabetes mellitus with peripheral vascular disease (H)        Venous stasis        Type 2 diabetes, controlled, with neuropathy (H)        Skin ulcer of left foot, limited to breakdown of skin (H)           Follow-ups after your visit        Your next 10 appointments already scheduled     Jul 26, 2018 12:40 PM CDT   CTA ABDOMEN PELVIS BILAT LEG RUNOFF W CONTR with UCCT2   [facility] ([facility])    [address] [address] [phone]           Please bring any scans or X-rays taken at other hospitals, if similar tests were done. Also bring a list of your medicines, including vitamins, minerals and over-the-counter drugs. It is safest to leave personal items at home.  Be sure to tell your doctor:   If you have any allergies.   If there s any chance you are pregnant.   If you are breastfeeding.    If you have diabetes as your medication may need to be adjusted for this exam.  You will have contrast for this exam. To prepare:   Do not eat or drink for 2 hours before your exam. If you need to take medicine, you may take it with small sips of water. (We may ask you to take liquid medicine as well.)   The day before your exam, drink extra fluids at least six 8-ounce glasses (unless your doctor tells you to restrict your fluids).  Patients over 70 or patients with diabetes or kidney problems:   If you haven t had a blood test (creatinine test) within the last  30 days, the Cardiologist/Radiologist may require you to get this test prior to your exam.  Please wear loose clothing, such as a sweat suit or jogging clothes. Avoid snaps, zippers and other metal. We may ask you to undress and put on a hospital gown.  If you have any questions, please call the Imaging Department where you will have your exam.            Jul 26, 2018  1:00 PM CDT   (Arrive by 12:45 PM)   New Vascular Visit with Augusto Maharaj MD   [facility] ([facility])    [address] [place] [id number] [phone]            Jul 31, 2018  3:00 PM CDT   Return Visit with Brayan Mcclain DPM   [facility] ([facility])    [address] [place] [phone]            Aug 14, 2018  2:30 PM CDT   Return Visit with Brayan Mcclain DPM   [facility] ([facility])    [address] [place] [phone]            Aug 15, 2018 12:45 PM CDT   (Arrive by 12:30 PM)   RETURN HIP with Ish Jackman MD   [facility] ([facility])    [address] [place] [id number] [phone]            Aug 16, 2018  8:00 AM CDT   (Arrive by 7:45 AM)   Hearing Aid Evaluation with Eddie Jane   [facility] ([facility])    [address] [place] [phone]           Please see your medical professional for ear cleaning prior to this appointment if you believe wax buildup may be an issue. All patients are required to have a physician's order stating the medical reason for the hearing test. Your doctor can send an electronic order, use their own form or we have provided a form (called Physician's Order for Audiology Services). It states that there is a medical reason for your exam. Without  an order you may need to be rescheduled until the order can be obtained.            Aug 21, 2018  2:30 PM CDT   Return Visit with Brayan Mcclain DPM   [facility]    [id number] [place] [phone]   [phone]            Aug 28, 2018  2:30 PM CDT   Return Visit with Brayan Mcclain DPM   [facility]    [id number] [place] [id number] [phone]            Sep 04, 2018  9:30 AM CDT   Return Visit with Brayan Mcclain DPM   [facility]    [id number] [place] [phone]            Sep 11, 2018  9:30 AM CDT   Return Visit with Brayan Mcclain DPM   [facility]    [address] [place] [phone]              Who to contact     If you have questions or need follow up information about today's clinic visit or your schedule please contact [facility] directly at [phone].  Normal or non-critical lab and imaging results will be communicated to you by Yadiohart, letter or phone within 4 business days after the clinic has received the results. If you do not hear from us within 7 days, please contact the clinic through Yadiohart or phone. If you have a critical or abnormal lab result, we will notify you by phone as soon as possible.  Submit refill requests through [employer] or call your pharmacy and they will forward the refill request to us. Please allow 3 business days for your refill to be completed.          Additional Information About Your Visit        [employer] Information     [employer] gives you secure access to your electronic health record. If you see a primary care provider, you can also send messages to your care team and make appointments. If you have questions, please call your primary care clinic.  If you do not  have a primary care provider, please call [phone] and they will assist you.      iZ3D is an electronic gateway that provides easy, online access to your medical records. With iZ3D, you can request a clinic appointment, read your test results, renew a prescription or communicate with your care team.     To access your existing account, please contact your [facility] or call [phone] for assistance.        Care EveryWhere ID     This is your Care EveryWhere ID. This could be used by other organizations to access your [facility] medical records  [id number]        Your Vitals Were     Pulse Temperature Pulse Oximetry             108 99.1  F (37.3  C) (Oral) 100%          Blood Pressure from Last 3 Encounters:   07/24/18 126/61   07/17/18 129/59   07/10/18 108/56    Weight from Last 3 Encounters:   06/29/18 76.7 kg (169 lb 1.6 oz)   06/19/18 76.7 kg (169 lb)   06/08/18 76.7 kg (169 lb)              Today, you had the following     No orders found for display         Today's Medication Changes          These changes are accurate as of 7/24/18 11:59 PM.  If you have any questions, ask your nurse or doctor.               These medicines have changed or have updated prescriptions.        Dose/Directions    * clopidogrel 75 MG tablet   Commonly known as:  PLAVIX   This may have changed:  when to take this   Used for:  Peripheral vascular disease, unspecified        Dose:  75 mg   Take 1 tablet (75 mg) by mouth daily   Quantity:  30 tablet   Refills:  11       * clopidogrel 75 MG tablet   Commonly known as:  PLAVIX   This may have changed:  Another medication with the same name was changed. Make sure you understand how and when to take each.   Used for:  Peripheral vascular disease (H)        Dose:  75 mg   Take 1 tablet (75 mg) by mouth daily   Quantity:  30 tablet   Refills:  3       gentamicin 0.1 % cream   Commonly known as:  GARAMYCIN   This may have changed:    - when to take  this  - reasons to take this  - additional instructions   Used for:  Ulcer of right lower leg, with fat layer exposed (H), Chronic venous hypertension with ulcer involving right side (H), Type 2 diabetes, controlled, with neuropathy (H)        Apply topically daily To right leg ulcer.   Quantity:  30 g   Refills:  5       * Notice:  This list has 2 medication(s) that are the same as other medications prescribed for you. Read the directions carefully, and ask your doctor or other care provider to review them with you.         Where to get your medicines      These medications were sent to [facility] - [place], [place] - [address], [address]  [address], [facility] [address]     Phone:  [phone]     ammonium lactate 12 % cream    cefadroxil 500 MG capsule                Primary Care Provider Office Phone # Fax #    Racheal Swift [phone]       [address] [address]        Equal Access to Services     SAMSON MELTON : Hadii aad ku hadasho Soomaali, waaxda luqadaha, qaybta kaalmada adeegyada, waxay geniin hayhollie duran . So [facility] [phone].    ATENCIÓN: Si habla español, tiene a rodrigues disposición servicios gratuitos de asistencia lingüística. [facility] [phone].    We comply with applicable federal civil rights laws and [place] laws. We do not discriminate on the basis of race, color, national origin, age, disability, sex, sexual orientation, or gender identity.            Thank you!     Thank you for choosing [facility]  for your care. Our goal is always to provide you with excellent care. Hearing back from our patients is one way we can continue to improve our services. Please take a few minutes to complete the written survey that you may receive in the mail after your visit with us. Thank you!             Your Updated Medication List - Protect others around you: Learn how to safely  use, store and throw away your medicines at www.disposemymeds.org.          This list is accurate as of 7/24/18 11:59 PM.  Always use your most recent med list.                   Brand Name Dispense Instructions for use Diagnosis    ammonium lactate 12 % cream    LAC-HYDRIN    385 g    Apply topically 2 times daily as needed for dry skin    Venous stasis, Type 2 diabetes, controlled, with neuropathy (H)       ascorbic acid 500 MG Tabs     30 tablet    Take 1 tablet (500 mg) by mouth 2 times daily    Ulcer of right lower leg, with fat layer exposed (H)       ASPIRIN PO      Take 81 mg by mouth daily        blood glucose monitoring lancets     3 Box    Use to test blood sugars 2 as directed.    Type 2 diabetes, uncontrolled, with neuropathy (H)       Blood Pressure Kit     1 kit    1 Device daily    Benign essential hypertension       cefadroxil 500 MG capsule    DURICEF    20 capsule    Take 1 capsule (500 mg) by mouth 2 times daily    Skin ulcer of left foot, limited to breakdown of skin (H), Type II or unspecified type diabetes mellitus with neurological manifestations, not stated as uncontrolled(250.60) (H), Diabetes mellitus with peripheral vascular disease (H)       * clopidogrel 75 MG tablet    PLAVIX    30 tablet    Take 1 tablet (75 mg) by mouth daily    Peripheral vascular disease, unspecified       * clopidogrel 75 MG tablet    PLAVIX    30 tablet    Take 1 tablet (75 mg) by mouth daily    Peripheral vascular disease (H)       ferrous sulfate 325 (65 Fe) MG tablet    IRON    60 tablet    Take 1 tablet (325 mg) by mouth 2 times daily    Peripheral vascular disease, unspecified       gentamicin 0.1 % cream    GARAMYCIN    30 g    Apply topically daily To right leg ulcer.    Ulcer of right lower leg, with fat layer exposed (H), Chronic venous hypertension with ulcer involving right side (H), Type 2 diabetes, controlled, with neuropathy (H)       insulin glargine 100 UNIT/ML injection    LANTUS SOLOSTAR    3 mL  "   Inject 16 Units Subcutaneous daily (with dinner) May take up to 22 units daily    Type 2 diabetes mellitus with diabetic peripheral angiopathy without gangrene, with long-term current use of insulin (H)       * insulin pen needle 31G X 8 MM    B-D U/F    100 each    Use 1 daily o as directed    Diabetes mellitus, type II (H)       * B-D U/F 31G X 5 MM   Generic drug:  insulin pen needle      USE ONCE D OR UTD        lisinopril 10 MG tablet    PRINIVIL/ZESTRIL    90 tablet    Take 1 tablet (10 mg) by mouth daily    Benign essential hypertension       * nateglinide 120 MG tablet    STARLIX    90 tablet    TAKE 1 TABLET BY MOUTH THREE TIMES DAILY BEFORE MEALS    Type 2 diabetes, controlled, with neuropathy (H)       * nateglinide 120 MG tablet    STARLIX    90 tablet    Take 1 tablet (120 mg) by mouth 3 times daily (before meals)    Diabetes mellitus (H)       ONETOUCH ULTRA test strip   Generic drug:  blood glucose monitoring     200 strip    USE TO TEST TWICE DAILY OR AS DIRECTED    Type 2 diabetes mellitus (H)       OPTIFOAM 6\"X6\" Pads     1 each    1 Box once a week    Ulcer of right leg, with fat layer exposed (H)       order for DME     2 each    Please measure and distribute 1 pair of 20mm Hg - 30mm Hg knee high ULCER CARE open or closed toe compression stockings.  Patient has a size 13 foot and please take this into consideration.  Jobst or equivalent    Varicose veins of lower extremities with other complications, Venous stasis ulcer of right lower extremity (H)       order for DME     3 each    Please measure and distribute 1 pair of 20mmHg - 30mmHg knee high open or closed toe compression stockings. Jobst ultrasheer or equivalent.    Varicose veins of both lower extremities with complications       order for DME     2 each    Please measure and distribute 1 pair of 30mmHg - 40mmHg knee high open toe ulcercare compression stockings. Jobst ultrasheer or equivalent.    Varicose veins of bilateral lower " extremities with other complications       sildenafil 50 MG tablet    VIAGRA    10 tablet    Take 1 tablet (50 mg) by mouth daily as needed for erectile dysfunction    Vasculogenic erectile dysfunction, unspecified vasculogenic erectile dysfunction type       silver sulfADIAZINE 1 % cream    SILVADENE    85 g    Apply topically daily To affected areas on right foot and leg.    Ulcer of right lower leg, with fat layer exposed (H), Chronic venous hypertension with ulcer involving right side (H), Type 2 diabetes, controlled, with neuropathy (H)       simvastatin 10 MG tablet    ZOCOR    90 tablet    Take 1 tablet (10 mg) by mouth At Bedtime    Type 2 diabetes, controlled, with neuropathy (H)       triamcinolone 0.1 % cream    KENALOG    30 g    Apply sparingly to left heel daily.    Dermatitis of left foot       VITAMIN C PO      Take 1,000 mg by mouth        VITAMIN D (CHOLECALCIFEROL) PO      Take 1,000 Units by mouth 2 times daily        * Notice:  This list has 6 medication(s) that are the same as other medications prescribed for you. Read the directions carefully, and ask your doctor or other care provider to review them with you.

## 2018-07-24 NOTE — ADDENDUM NOTE
Encounter addended by: Lane Suggs on: 7/24/2018  3:34 PM<BR>     Actions taken: Flowsheet data copied forward, Sign clinical note, Flowsheet accepted
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
## 2018-07-24 NOTE — PROGRESS NOTES
Physician cosignature/electronic signature indicates approval of this ITP document. I have established, reviewed and made necessary changes to the individualized treatment plan and exercise prescription for this patient.     07/24/18 1500   Session   Session 150 Day Individualized Treatment Plan  (ITP copy forward for MD review. Pt not present)   Certified through this date 08/22/18   Cardiac Rehab Assessment   Cardiac Rehab Assessment Amos Walker is a 72 year old gentleman with a history of PAD, and CAD presenting with two vessel CAD, and underwent coronary angiogram on 3/1 which demonstrated two vessel disease, however, d/t contrast load already given, and needed to wait until 3/8 for successful percutaneous coronary intervention of the left main, mid and proximal LAD, proximal and mid RCA. 3/29 Patient feels he is beginning to have more stamina walking. He has been relying on his cane less, but carrying with if he needs to use it. Currently at 2.7 METs and has attended 5 sessions. Skilled therapy is recommended to monitor CV resonse to exercise, to provide education on exercise principles and risk factors of smoking and support to acheive goals. 4/26 Pt is no longer using can or bringing it to rehab. He has attended 14 rehab sessions and feels he continues to gain strength in his legs. He is still smoking 0.5 ppd, but states he enjoys it less. Continued skilled therapy is recommended to monitor CV response to exercise, and to continue to provide education on introducing home exercise.  5/25 Pt would like to continue with rehab sessions as he feels he has been benefiting greatly. Continue to progress as tolerated. 6/8 Pt notes that he has initiated independent exercise routine at home 5x/week. Continues to walk without cane and notes improved confidence in his ability. Pt has desire to continue to progress with home exercise confidence and intensity. 7/24 ITP copy forward for review. Pt has not been able to  complete d/c session due to recent foot injury. Pt notes he will contact rehab to establish d/c session. Skilled therapy beneficial to establish home exercise and educate pt on risk factor management.    General Information   Treatment Diagnosis Stent   Date of Treatment Diagnosis 03/08/18   Significant Past CV History PAD   Comorbidities PAD;DM   Hospital Location [facility]   Hospital Discharge Date 03/08/18   Signs and Symptoms Post Hospital Discharge Fatigue   Outpatient Cardiac Rehab Start Date 03/15/18   Primary Physician Racheal Swift   Primary Physician Follow Up Completed   Surgeon Ghanshyam Moore MD   Cardiologist Dr. Chinchilla   Ejection Fraction 51%   Risk Stratification Low   Summary of Cath Report   Summary of Cath Report Available   Date Performed 03/01/18   Left Main 50-60%   LAD a long 50-60% proximal LAD stenosis, long 60% mLAD stenosis    LCX Prox 25-50%, mLCX 25-50, pLCX 50%   RCA pRCA 50-60% & mRCA 70%   Cath Report Comments Two vessel coronary artery disease with left main involvement.   Living and Work Status    Living Arrangements and Social Status Mint/Parallels   Support System Live with an adult   Return to Employment Retired   Occupation    Preventative Medications   CMS recommended medications Ace inhibitors;Antiplatelets;Lipid Lowering;Influenza vaccination;Pneumonia vaccination   Fall Risk Screen   Fall screen completed by Cardiac Rehab   Have you fallen 2 or more times in the past year? Yes   Have you fallen and had an injury in the past year? Yes  (Pt broke hip in 8/17)   Timed Up and Go score (seconds) 1st 13.45, 2nd 11.88   Fall screen comments Pt was seen my physical therapy in the fall of 2017   Pain   Patient Currently in Pain No   Physical Assessments   Incisions Not assessed   Edema Not assessed   Right Lung Sounds not assessed   Left Lung Sounds not assessed   Limitations Orthopedic   Comments Pt had hip surgery in 8/217   Individualized  Treatment Plan   Monitored Sessions Scheduled 31   Monitored Sessions Attended 30   Oxygen   Supplemental Oxygen needed No   Nutrition Management - Weight Management   Assessment Re-assessment   Age 72   Initial Rate Your Plate Score. Dietary tool to assess eating patterns. Scores range from 24 to 72. The higher the score the healthier the eating pattern. 64   Weight Management Comments 4/26 Pt trying to gain wt lost after hip surgery. He states dr may reduce BP medication until he gains more weight.5/25 pt notes that he is comfortable with his current weight.    Nutrition Management - Lipids   Lipids Labs Available   Date 10/25/17   Total Cholesterol 92   Triglycerides 100   HDL 41   LDL 30   Prescribed Lipid Medication Yes   Statin Intensity Low Intensity   Nutrition Management - Diabetes   Diabetes Type II   Do you Monitor BS at Home? Other (see comments)  (Pt has started to check check BS more regular)   Diabetes Medication Prescribed Yes, Insulin;Yes, Oral Medication   Hb A1C Date: 10/25/17   Hb A1C Result: 6.5   Nutrition Management Summary   Dietary Recommendations Low Fat;Low Cholesterol;Low Sodium   Stages of Change for Diet Compliance Preparation   Interventions Planned Attend Nutrition Education Class(es)   Psychosocial Management   Psychosocial Assessment Re-assessment   Is there history of clinical depression or increased risk of depression? No previous history   Current Level of Stress per Patient Report Moderate    Current Coping Skills Other (see comments)  (Prayer and smoking)   Initial Patient Health Questionnaire -9 Score (PHQ-9) for depression. 5-9 Minimal symptoms, 10-14 Minor depression, 15-19 Major depression, moderately severe, > 20 Major depression, severe  3   Initial [facility] Survey score.  Quality of Life:   If total score > 25 review individual areas where patient rated a 4 or 5.  Consider patients current medical condition and what role that plays on the score.   Adjust treatment  protocol to improve areas of concern.  Consider the following:  PHQ9 score, DASI, and re-assessment within the next 30 days to assist with developing treatments.  21   Stages of Change Preparation   Interventions Planned Patient denies need for intervention at this time.   Other Core Components - Hypertension   History of or Diagnosis of Hypertension Yes   Currently taking Anti-Hypertensives Yes;Ace Inhibitor   Other Core Components - Tobacco   History of Tobacco Use Yes   Tobacco Use Status Currently smoking - everyday   Tobacco Habit Cigarettes   Tobacco Use per Day (average) (<0.5ppd)   Years of Tobacco Use 55   Stages of Change Pre-Contemplation   Tobacco Comments 4/26 Pt not ready to set quit date, but notes he is smoking 10 cigs a day and enjoys it less.5/25 Pt requests not do discuss smoking habits   Other Core Components Summary   Interventions Planned Educate on benefits of smoking cessation   Other Core Components Comments 5/25 pt in precontemplation for smoking cessation. Denies conversation about current habits.    Activity/Exercise History   Activity/Exercise Assessment Re-assessment   Activity/Exercise Status prior to event? Sedentary   Number of Days Currently participating in Moderate Physical Activity? 5   Number of Days Currently performing  Aerobic Exercise (including rehab)? 3   Number of Minutes per Session Currently of Aerobic Exercise (average)? 2   Current Stage of Change (Physical Activity) Action   Current Stage of Change (Aerobic Exercise) Action   Patient Goals Goal #1;Goal #2   Goal #1 Description Pt will increae his strength and endurance to be able to walk 10 minutes without a cane   Goal #1 Target Date 04/29/18   Goal #1 Date Met 04/26/18   Goal #1 Progress Towards Goal 4/26 Pt is walking to/from rehab without can. He is no longer bringing it. He notes he is picking up his feel better and not dragging his feet as much.    Goal #2 Description Pt will have the confidence to be able to  resume walking 40 minutes 3-5x per week.   Goal #2 Target Date 05/14/18   Goal #2 Date Met 05/25/18   Goal #2 Progress Towards Goal 4/26 Pt still working on confidence in walking 40 min. He is still walking 20 min outside of rehab 2 days/week. Pt also plans to start using 5 pound weights at home 1 extra day a week. 5/25 Pt notes comfort walking 40 minutes 3x/day with his cane. Reports feeling more confident with cane and increased speed of walking.    Activity/Exercise Comments Pt was walking 40 minutes a day 4-5x a week up until he broke his hip in August   Exercise Assessment   6 Minute Walk Predicted - Gender Selection Male   Initial 6 Minute Walk Distance (Feet) 1156 ft   Resting HR 96 bpm   Exercise  bpm   Post Exercise HR 90 bpm   Resting BP 80/44   Exercise /44   Post Exercise BP 84/44   Pre  mg/dL   Effort Rating 5   Current MET Level 5.4   MET Level Goal 6 METs   ECG Rhythm Normal sinus rhythm   Ectopy None   Current Symptoms Denies symptoms   Limitations/Restrictions None   Exercise Prescription   Mode Treadmill;Airdyne;Nustep;Weights   Duration/Time 15-30 min;30-45 min   Frequency 3 daysweek   THR (85% of age predicted max HR) 127.5   OMNI Effort Rating (0-10 Scale) 4-6/10   Progression Continuous bouts;Aerobic exercise to OMNI rating of 5-7, and heart rate at or below target;Progress peak intensity by 1/2 MET per week;Total exercise time of 30-45 minutes   Recommended Home Exercise   Type of Exercise Walking   Frequency (days per week) daily   Duration (minutes per session) 15-30 min   Effort Rating Recommended 4-6/10   Current Home Exercise   Type of Exercise Walking   Frequency (days per week) 5   Duration (minutes per session) 45-60   Follow-up/On-going Support   Provider follow-up needed on the following No follow-up needed   Learning Assessment   Learner Patient   Primary Language English   Preferred Learning Style Reading;Pictures/Video   Barriers to Learning Hearing   Patient  Education   Education recommended Anatomy and Physiology of the Heart;Exercise Principles;Medication Overview;Nutrition;Risk Factors;Smoking Cessation   Education Comments 4/26 Gave pt schedule and class description, encouraged to come to classes of interest. 5/25 pt denied want to attend education classes.

## 2018-07-24 NOTE — NURSING NOTE
Amos Walker's goals for this visit include:   Chief Complaint   Patient presents with     RECHECK     Right leg Ulcer     He requests these members of his care team be copied on today's visit information: Racheal Swift    PCP: Racheal Swift    Referring Provider:  No referring provider defined for this encounter.    /61 (BP Location: Left arm, Patient Position: Chair, Cuff Size: Adult Regular)  Pulse 108  SpO2 100%    Do you need any medication refills at today's visit? no    Yumiko Carrasco CMA

## 2018-07-26 ENCOUNTER — OFFICE VISIT (OUTPATIENT)
Dept: VASCULAR SURGERY | Facility: CLINIC | Age: 73
End: 2018-07-26
Payer: MEDICARE

## 2018-07-26 ENCOUNTER — RADIANT APPOINTMENT (OUTPATIENT)
Dept: CT IMAGING | Facility: CLINIC | Age: 73
End: 2018-07-26
Attending: SURGERY
Payer: MEDICARE

## 2018-07-26 VITALS
DIASTOLIC BLOOD PRESSURE: 66 MMHG | RESPIRATION RATE: 18 BRPM | SYSTOLIC BLOOD PRESSURE: 150 MMHG | OXYGEN SATURATION: 100 % | HEART RATE: 107 BPM

## 2018-07-26 DIAGNOSIS — Z98.890 STATUS POST CORONARY ANGIOGRAM: ICD-10-CM

## 2018-07-26 DIAGNOSIS — L98.499 NON-HEALING ULCER (H): ICD-10-CM

## 2018-07-26 DIAGNOSIS — N18.30 CKD (CHRONIC KIDNEY DISEASE) STAGE 3, GFR 30-59 ML/MIN (H): ICD-10-CM

## 2018-07-26 DIAGNOSIS — I70.235: Primary | ICD-10-CM

## 2018-07-26 DIAGNOSIS — E11.40 TYPE 2 DIABETES, CONTROLLED, WITH NEUROPATHY (H): ICD-10-CM

## 2018-07-26 DIAGNOSIS — Z01.818 PRE-OP EXAM: ICD-10-CM

## 2018-07-26 DIAGNOSIS — I10 ESSENTIAL HYPERTENSION: ICD-10-CM

## 2018-07-26 DIAGNOSIS — I99.8 ISCHEMIA OF BOTH LOWER EXTREMITIES: ICD-10-CM

## 2018-07-26 LAB
CREAT BLD-MCNC: 1.1 MG/DL (ref 0.66–1.25)
GFR SERPL CREATININE-BSD FRML MDRD: 66 ML/MIN/1.7M2

## 2018-07-26 RX ORDER — IOPAMIDOL 755 MG/ML
150 INJECTION, SOLUTION INTRAVASCULAR ONCE
Status: COMPLETED | OUTPATIENT
Start: 2018-07-26 | End: 2018-07-26

## 2018-07-26 RX ADMIN — IOPAMIDOL 150 ML: 755 INJECTION, SOLUTION INTRAVASCULAR at 13:12

## 2018-07-26 ASSESSMENT — PAIN SCALES - GENERAL: PAINLEVEL: NO PAIN (0)

## 2018-07-26 NOTE — PATIENT INSTRUCTIONS
We will see you back in 6 weeks along with an ultrasound for vein mapping.    Please call me with any questions or concerns or send me a My Chart message.    Catia Lowery RN  [phone]

## 2018-07-26 NOTE — DISCHARGE INSTRUCTIONS

## 2018-07-26 NOTE — PROGRESS NOTES
VASCULAR SURGERY CLINIC NOTE    HPI:    Pt is a 73 year old year old male with a past medical history significant for CAD, DM2, HTN, CKD Stage 3, and tobacco abuse returns for evaluation of nonhealing ulcers in the right foot. The patient has been followed by Dr. Stock, who last saw him in March and recommended right femoral endarterectomy and possible stent of the SFA. The patient underwent preop evaluation by cardiology and underwent placement of drug-eluting stents in three coronary arteries on 3/8. He has since been in cardiac rehab and returns today for further evaluation of the right foot ulcers.    Review Of Systems:   General: Feeling relatively well. No pain in foot  Eyes: cataracts  Respiratory: Denies SOB  Cardio: Drug eluting stents placed 3/8, still on Plavix  Gastrointestinal: Denies N/V  Neurologic: Denies HA, TIAs, prior strokes    Patient Active Problem List   Diagnosis     Senile nuclear sclerosis     PVD (peripheral vascular disease) (H)     HTN (hypertension)     CKD (chronic kidney disease) stage 3, GFR 30-59 ml/min     Type 2 diabetes, controlled, with neuropathy (H)     Diabetes mellitus with peripheral vascular disease (H)     Fracture of neck of femur (H)     Aftercare following joint replacement [Z47.1]     Long-term (current) use of anticoagulants [Z79.01]     Status post left heart catheterization     Status post coronary angiogram       Objective:  Vital Signs:  /66 (BP Location: Left arm)  Pulse 107  Resp 18  SpO2 100%    Prior to Admission medications    Medication Sig Start Date End Date Taking? Authorizing Provider   ammonium lactate (LAC-HYDRIN) 12 % cream Apply topically 2 times daily as needed for dry skin 7/24/18   Brayan Mcclain DPM   Ascorbic Acid (VITAMIN C PO) Take 1,000 mg by mouth    Reported, Patient   ascorbic acid 500 MG TABS Take 1 tablet (500 mg) by mouth 2 times daily 8/30/17   Rik Saleh NP   ASPIRIN PO Take 81 mg by mouth daily    Unknown,  "Entered By History   B-D U/F insulin pen needle USE ONCE D OR UTD 4/28/18   Reported, Patient   blood glucose monitoring (FREESTYLE) lancets Use to test blood sugars 2 as directed. 12/18/15   Silvina Patiño MD   Blood Pressure KIT 1 Device daily 5/14/18   Racheal Swift MD   cefadroxil (DURICEF) 500 MG capsule Take 1 capsule (500 mg) by mouth 2 times daily 7/24/18   Brayan Mcclain DPM   clopidogrel (PLAVIX) 75 MG tablet Take 1 tablet (75 mg) by mouth daily 4/30/18   Racheal Swift MD   clopidogrel (PLAVIX) 75 MG tablet Take 1 tablet (75 mg) by mouth daily  Patient taking differently: Take 75 mg by mouth every evening  8/30/17   Rik Saleh NP   ferrous sulfate (IRON) 325 (65 FE) MG tablet Take 1 tablet (325 mg) by mouth 2 times daily 8/30/17   Rik Saleh NP   Gauze Pads & Dressings (OPTIFOAM) 6\"X6\" PADS 1 Box once a week 9/8/14   Raman Villanueva MD   gentamicin (GARAMYCIN) 0.1 % cream Apply topically daily To right leg ulcer.  Patient taking differently: Apply topically daily as needed To right leg ulcer. 8/30/17   Rik Saleh NP   insulin glargine (LANTUS SOLOSTAR) 100 UNIT/ML pen Inject 16 Units Subcutaneous daily (with dinner) May take up to 22 units daily 6/29/18   Racheal Swift MD   insulin pen needle (B-D U/F) 31G X 8 MM Use 1 daily o as directed 5/29/17   Silvina Patiño MD   lisinopril (PRINIVIL/ZESTRIL) 10 MG tablet Take 1 tablet (10 mg) by mouth daily 6/29/18   Racheal Swift MD   nateglinide (STARLIX) 120 MG tablet Take 1 tablet (120 mg) by mouth 3 times daily (before meals) 4/30/18   Racheal Swift MD   nateglinide (STARLIX) 120 MG tablet TAKE 1 TABLET BY MOUTH THREE TIMES DAILY BEFORE MEALS 8/30/17   Rik Saleh NP   ONETOUCH ULTRA test strip USE TO TEST TWICE DAILY OR AS DIRECTED 4/3/18   Logeais, Racheal Truong MD   order for DME Please measure and distribute 1 pair of 30mmHg - 40mmHg knee high open toe ulcercare " compression stockings. Jobst ultrasheer or equivalent. 4/28/17   Olga Burgos APRN CNP   order for DME Please measure and distribute 1 pair of 20mmHg - 30mmHg knee high open or closed toe compression stockings. Jobst ultrasheer or equivalent. 10/29/15   Lane Jenkins MD   order for DME Please measure and distribute 1 pair of 20mm Hg - 30mm Hg knee high ULCER CARE open or closed toe compression stockings.   Patient has a size 13 foot and please take this into consideration.   Jobst or equivalent 9/28/15   Sivlina Patiño MD   sildenafil (VIAGRA) 50 MG tablet Take 1 tablet (50 mg) by mouth daily as needed for erectile dysfunction 10/31/16   Silvina Patiño MD   silver sulfADIAZINE (SILVADENE) 1 % cream Apply topically daily To affected areas on right foot and leg. 2/6/18   Brayan Mcclain DPM   simvastatin (ZOCOR) 10 MG tablet Take 1 tablet (10 mg) by mouth At Bedtime 7/23/18   Racheal Swift MD   triamcinolone (KENALOG) 0.1 % cream Apply sparingly to left heel daily. 2/21/18   Brayan Mcclain DPM   VITAMIN D, CHOLECALCIFEROL, PO Take 1,000 Units by mouth 2 times daily    Unknown, Entered By History       PHYSICAL EXAM:  General: WD WN WM in NAD  HEENT: PIOTR, EOMI, sclera non-icteric  Neck: supple, normal carotid upstrokes, no bruits  Heart:: RRR. S1 S2 normal, no murmur, gallop, or rub  Lungs: clear to auscultation  GI:  Abdomen soft, nontender to light palpation. No masses or organomegaly. Aortic pulsation midline and not enlarged  EXTREMITIES: Right foot with advanced trophic changes. Ulcer measuring 2 x 3 cm on lateral foot, superficial ulceration measuring < 1 cm on dorsal foot. Healed anterior ankle wound. Very slow capillary refill present. Left foot with advanced trophic changes. Healing ulceration on medial dorsal surface with fibrinous cap.   PULSES: Femoral pulses 2+ and bilaterally equal. No distal pulses palpated.  Neurologic: Grossly intact      Photos:                    Imaging:  CTA  I personally reviewed the images and my interpretation is multilevel occlusive disease. Extensive calcific atherosclerosis involving distal aorta and iliacs bilaterally. Right femoral artery with near occlusive plaque extending into PFA. Diffuse fem-pop disease with occluded distal SFA and stent. Popliteal artery diseased, reconstituted PT and peroneal arteries that provide 2-vessel outflow to the foot.      Assessment:  Critical right and left lower extremity ischemia. Needs restoration of flow to the right foot, favor possible fem-PT bypass with arm vein if adequate. The patient will need to continue Plavix until 9/8.     Plan:  RTC in 6 weeks with upper extremity vein mapping on arrival. Continue excellent wound management and follow up with weekly visits to wound clinic.Anticpate bypass in early September.      ADAM Maharaj MD  Vascular Surgery Service  [facility]

## 2018-07-26 NOTE — NURSING NOTE
Spoke with Dr Moore regarding Mr Walker's plavix prior to surgery.  Dr Maharaj aware of the following.  Mr Walker had LM and LAD drug eluting stents placed in  March of 2018.  Dr Moore wants pt to have a full 6 months of Plavix before holding for surgery.  He recommends that patient continues the 81 mg aspirin throughout,  He should restart Plavix immediately post op (at the surgeon's discretion) with a loading dose of 300 mgs, then 75 mgs daily.

## 2018-07-26 NOTE — NURSING NOTE
Chief Complaint   Patient presents with     RECHECK     Follow up limb ischemia with wounds on right foot        Vitals:    07/26/18 1334   BP: 150/66   BP Location: Left arm   Pulse: 107   Resp: 18   SpO2: 100%       There is no height or weight on file to calculate BMI.            Trish Fernandez LPN

## 2018-07-26 NOTE — LETTER
7/26/2018       RE: Amos Walker  [address] [facility] [address]     Dear Colleague,    Thank you for referring your patient, Amos Walker, to the [facility] at [facility]. Please see a copy of my visit note below.    VASCULAR SURGERY CLINIC NOTE    HPI:    Pt is a 73 year old year old male with a past medical history significant for CAD, DM2, HTN, CKD Stage 3, and tobacco abuse returns for evaluation of nonhealing ulcers in the right foot. The patient has been followed by Dr. Stock, who last saw him in March and recommended right femoral endarterectomy and possible stent of the SFA. The patient underwent preop evaluation by cardiology and underwent placement of drug-eluting stents in three coronary arteries on 3/8. He has since been in cardiac rehab and returns today for further evaluation of the right foot ulcers.    Review Of Systems:   General: Feeling relatively well. No pain in foot  Eyes: cataracts  Respiratory: Denies SOB  Cardio: Drug eluting stents placed 3/8, still on Plavix  Gastrointestinal: Denies N/V  Neurologic: Denies HA, TIAs, prior strokes    Patient Active Problem List   Diagnosis     Senile nuclear sclerosis     PVD (peripheral vascular disease) (H)     HTN (hypertension)     CKD (chronic kidney disease) stage 3, GFR 30-59 ml/min     Type 2 diabetes, controlled, with neuropathy (H)     Diabetes mellitus with peripheral vascular disease (H)     Fracture of neck of femur (H)     Aftercare following joint replacement [Z47.1]     Long-term (current) use of anticoagulants [Z79.01]     Status post left heart catheterization     Status post coronary angiogram       Objective:  Vital Signs:  /66 (BP Location: Left arm)  Pulse 107  Resp 18  SpO2 100%    Prior to Admission medications    Medication Sig Start Date End Date Taking? Authorizing Provider   ammonium lactate (LAC-HYDRIN) 12 % cream Apply topically 2 times daily as  "needed for dry skin 7/24/18   Brayan Mcclain DPM   Ascorbic Acid (VITAMIN C PO) Take 1,000 mg by mouth    Reported, Patient   ascorbic acid 500 MG TABS Take 1 tablet (500 mg) by mouth 2 times daily 8/30/17   Rik Saleh NP   ASPIRIN PO Take 81 mg by mouth daily    Unknown, Entered By History   B-D U/F insulin pen needle USE ONCE D OR UTD 4/28/18   Reported, Patient   blood glucose monitoring (FREESTYLE) lancets Use to test blood sugars 2 as directed. 12/18/15   Silvina Patiño MD   Blood Pressure KIT 1 Device daily 5/14/18   Racheal Swift MD   cefadroxil (DURICEF) 500 MG capsule Take 1 capsule (500 mg) by mouth 2 times daily 7/24/18   Brayan Mcclain DPM   clopidogrel (PLAVIX) 75 MG tablet Take 1 tablet (75 mg) by mouth daily 4/30/18   Racheal Swift MD   clopidogrel (PLAVIX) 75 MG tablet Take 1 tablet (75 mg) by mouth daily  Patient taking differently: Take 75 mg by mouth every evening  8/30/17   Rik Saleh NP   ferrous sulfate (IRON) 325 (65 FE) MG tablet Take 1 tablet (325 mg) by mouth 2 times daily 8/30/17   Rik Saleh NP   Gauze Pads & Dressings (OPTIFOAM) 6\"X6\" PADS 1 Box once a week 9/8/14   Raman Villanueva MD   gentamicin (GARAMYCIN) 0.1 % cream Apply topically daily To right leg ulcer.  Patient taking differently: Apply topically daily as needed To right leg ulcer. 8/30/17   Rik Saleh NP   insulin glargine (LANTUS SOLOSTAR) 100 UNIT/ML pen Inject 16 Units Subcutaneous daily (with dinner) May take up to 22 units daily 6/29/18   Racheal Swift MD   insulin pen needle (B-D U/F) 31G X 8 MM Use 1 daily o as directed 5/29/17   Silvina Patiño MD   lisinopril (PRINIVIL/ZESTRIL) 10 MG tablet Take 1 tablet (10 mg) by mouth daily 6/29/18   Racheal Swift MD   nateglinide (STARLIX) 120 MG tablet Take 1 tablet (120 mg) by mouth 3 times daily (before meals) 4/30/18   Racheal Swift MD   nateglinide (STARLIX) 120 MG tablet TAKE 1 " TABLET BY MOUTH THREE TIMES DAILY BEFORE MEALS 8/30/17   Rik Saleh NP   ONETOUCH ULTRA test strip USE TO TEST TWICE DAILY OR AS DIRECTED 4/3/18   Racheal Swift MD   order for DME Please measure and distribute 1 pair of 30mmHg - 40mmHg knee high open toe ulcercare compression stockings. Jobst ultrasheer or equivalent. 4/28/17   Olga Burgos APRN CNP   order for DME Please measure and distribute 1 pair of 20mmHg - 30mmHg knee high open or closed toe compression stockings. Jobst ultrasheer or equivalent. 10/29/15   Lane Jenkins MD   order for DME Please measure and distribute 1 pair of 20mm Hg - 30mm Hg knee high ULCER CARE open or closed toe compression stockings.   Patient has a size 13 foot and please take this into consideration.   Jobst or equivalent 9/28/15   Silvina Patiño MD   sildenafil (VIAGRA) 50 MG tablet Take 1 tablet (50 mg) by mouth daily as needed for erectile dysfunction 10/31/16   Silvina Patiño MD   silver sulfADIAZINE (SILVADENE) 1 % cream Apply topically daily To affected areas on right foot and leg. 2/6/18   Brayan Mcclain DPM   simvastatin (ZOCOR) 10 MG tablet Take 1 tablet (10 mg) by mouth At Bedtime 7/23/18   Racheal Swift MD   triamcinolone (KENALOG) 0.1 % cream Apply sparingly to left heel daily. 2/21/18   Brayan Mcclain DPM   VITAMIN D, CHOLECALCIFEROL, PO Take 1,000 Units by mouth 2 times daily    Unknown, Entered By History       PHYSICAL EXAM:  General: WD WN WM in NAD  HEENT: PIOTR, EOMI, sclera non-icteric  Neck: supple, normal carotid upstrokes, no bruits  Heart:: RRR. S1 S2 normal, no murmur, gallop, or rub  Lungs: clear to auscultation  GI:  Abdomen soft, nontender to light palpation. No masses or organomegaly. Aortic pulsation midline and not enlarged  EXTREMITIES: Right foot with advanced trophic changes. Ulcer measuring 2 x 3 cm on lateral foot, superficial ulceration measuring < 1 cm on dorsal foot. Healed anterior  ankle wound. Very slow capillary refill present. Left foot with advanced trophic changes. Healing ulceration on medial dorsal surface with fibrinous cap.   PULSES: Femoral pulses 2+ and bilaterally equal. No distal pulses palpated.  Neurologic: Grossly intact      Photos:                   Imaging:  CTA  I personally reviewed the images and my interpretation is multilevel occlusive disease. Extensive calcific atherosclerosis involving distal aorta and iliacs bilaterally. Right femoral artery with near occlusive plaque extending into PFA. Diffuse fem-pop disease with occluded distal SFA and stent. Popliteal artery diseased, reconstituted PT and peroneal arteries that provide 2-vessel outflow to the foot.      Assessment:  Critical right and left lower extremity ischemia. Needs restoration of flow to the right foot, favor possible fem-PT bypass with arm vein if adequate. The patient will need to continue Plavix until 9/8.     Plan:  RTC in 6 weeks with upper extremity vein mapping on arrival. Continue excellent wound management and follow up with weekly visits to wound clinic.Anticpate bypass in early September.      Again, thank you for allowing me to participate in the care of your patient.      Sincerely,    Augusto Maharaj MD

## 2018-07-26 NOTE — MR AVS SNAPSHOT
After Visit Summary   7/26/2018    Amos Walker    MRN: [id number]           Patient Information     Date Of Birth          1945        Visit Information        Provider Department      7/26/2018 1:00 PM Augusto Maharaj MD 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    Please call me with any questions or concerns or send me a My Chart message.    Catia Lowery RN  [phone]          Follow-ups after your visit        Follow-up notes from your care team     Return in about 6 weeks (around 9/6/2018).      Your next 10 appointments already scheduled     Jul 31, 2018  3:00 PM CDT   Return Visit with Brayan Mcclain DPM   [facility])    [address] [place] [id number] [phone]            Aug 14, 2018  2:30 PM CDT   Return Visit with Brayan Mcclain DPM   [facility])    [address] [place] [id number]   [phone]            Aug 15, 2018 12:45 PM CDT   (Arrive by 12:30 PM)   RETURN HIP with Ish Jackman MD   [facility] ([facility])    [address] [place] [address] [phone]            Aug 16, 2018  8:00 AM CDT   (Arrive by 7:45 AM)   Hearing Aid Evaluation with Eddie Jane   [facility] ([facility])    [address] [place] [address] [phone]           Please see your medical professional for ear cleaning prior to  this appointment if you believe wax buildup may be an issue. All patients are required to have a physician's order stating the medical reason for the hearing test. Your doctor can send an electronic order, use their own form or we have provided a form (called Physician's Order for Audiology Services). It states that there is a medical reason for your exam. Without an order you may need to be rescheduled until the order can be obtained.            Aug 21, 2018  2:30 PM CDT   [facility] Brayan [facility]clain D[facility]    [address] [place] [address]   [phone]            Aug 28, 2018  2:30 PM CDT   Return Visit with Brayan Mcclain DPM   [facility]    [address] [place] [address]   [phone]            Sep 04, 2018  9:30 AM CDT   Return Visit with Brayan Mcclain DPM   [facility])    [address] [place] [address]   [phone]            Sep 06, 2018 11:30 AM CDT   US UPPER EXTREMITY VENOUS MAPPING BILATERAL with UCUSV2   [facility] ([facility])    [address] [place] [address] [phone]           Please bring a list of your medicines (including vitamins, minerals and over-the-counter drugs). Also, tell your doctor about any allergies you may have. Wear comfortable clothes and leave your valuables at home.  You do not need to do anything special to prepare for your exam.  Please call the Imaging Department at your exam site with any questions.            Sep 06, 2018  1:00 PM CDT   (Arrive by 12:45 PM)   New Vascular Visit with Augusto Maharaj MD   [facility] ([facility])    [address]  3rd Floor  [facility] [address] [phone]            Sep 11, 2018  9:30 AM CDT    Return Visit with Brayan Mcclain DPM   [facility])    [address] [place] [address] [phone]              Future tests that were ordered for you today     Open Future Orders        Priority Expected Expires Ordered    US Upper Extremity Venous Mapping Bilateral Routine  7/26/2019 7/26/2018            Who to contact     Please call your clinic at [phone] to:    Ask questions about your health    Make or cancel appointments    Discuss your medicines    Learn about your test results    Speak to your doctor            Additional Information About Your Visit        "[employer] Information     [employer] gives you secure access to your electronic health record. If you see a primary care provider, you can also send messages to your care team and make appointments. If you have questions, please call your primary care clinic.  If you do not have a primary care provider, please call [phone] and they will assist you.      [employer] is an electronic gateway that provides easy, online access to your medical records. With [employer], you can request a clinic appointment, read your test results, renew a prescription or communicate with your care team.     To access your existing account, please contact your [facility] or call [phone] for assistance.        Care EveryWhere ID     This is your Care EveryWhere ID. This could be used by other organizations to access your [place] medical records  [id number]        Your Vitals Were     Pulse Respirations Pulse Oximetry             107 18 100%          Blood Pressure from Last 3 Encounters:   07/26/18 150/66   07/24/18 126/61   07/17/18 129/59    Weight from Last 3 Encounters:   06/29/18 169 lb 1.6 oz   06/19/18 169 lb   06/08/18 169 lb                 Today's Medication Changes          These changes are accurate as of 7/26/18  4:51 PM.  If you have any questions, ask your  nurse or doctor.               These medicines have changed or have updated prescriptions.        Dose/Directions    * clopidogrel 75 MG tablet   Commonly known as:  PLAVIX   This may have changed:  when to take this   Used for:  Peripheral vascular disease, unspecified        Dose:  75 mg   Take 1 tablet (75 mg) by mouth daily   Quantity:  30 tablet   Refills:  11       * clopidogrel 75 MG tablet   Commonly known as:  PLAVIX   This may have changed:  Another medication with the same name was changed. Make sure you understand how and when to take each.   Used for:  Peripheral vascular disease (H)        Dose:  75 mg   Take 1 tablet (75 mg) by mouth daily   Quantity:  30 tablet   Refills:  3       gentamicin 0.1 % cream   Commonly known as:  GARAMYCIN   This may have changed:    - when to take this  - reasons to take this  - additional instructions   Used for:  Ulcer of right lower leg, with fat layer exposed (H), Chronic venous hypertension with ulcer involving right side (H), Type 2 diabetes, controlled, with neuropathy (H)        Apply topically daily To right leg ulcer.   Quantity:  30 g   Refills:  5       * Notice:  This list has 2 medication(s) that are the same as other medications prescribed for you. Read the directions carefully, and ask your doctor or other care provider to review them with you.             Primary Care Provider Office Phone # Fax #    Racheal Swift [phone]       [address] [address]        Equal Access to Services     SAMSON MELTON AH: Nataliia ferrell Soger, waaxda luqadaha, qaybta kaalmada adejessee, yolanda lopez. So [facility] [phone].    ATENCIÓN: Si habla español, tiene a rodrigues disposición servicios gratuitos de asistencia lingüística. Llame al [phone].    We comply with applicable federal civil rights laws and [place] laws. We do not discriminate on the basis of race, color, national origin,  age, disability, sex, sexual orientation, or gender identity.            Thank you!     Thank you for choosing [facility]IC  for your care. Our goal is always to provide you with excellent care. Hearing back from our patients is one way we can continue to improve our services. Please take a few minutes to complete the written survey that you may receive in the mail after your visit with us. Thank you!             Your Updated Medication List - Protect others around you: Learn how to safely use, store and throw away your medicines at www.disposemymeds.org.          This list is accurate as of 7/26/18  4:51 PM.  Always use your most recent med list.                   Brand Name Dispense Instructions for use Diagnosis    ammonium lactate 12 % cream    LAC-HYDRIN    385 g    Apply topically 2 times daily as needed for dry skin    Venous stasis, Type 2 diabetes, controlled, with neuropathy (H)       ascorbic acid 500 MG Tabs     30 tablet    Take 1 tablet (500 mg) by mouth 2 times daily    Ulcer of right lower leg, with fat layer exposed (H)       ASPIRIN PO      Take 81 mg by mouth daily        blood glucose monitoring lancets     3 Box    Use to test blood sugars 2 as directed.    Type 2 diabetes, uncontrolled, with neuropathy (H)       Blood Pressure Kit     1 kit    1 Device daily    Benign essential hypertension       cefadroxil 500 MG capsule    DURICEF    20 capsule    Take 1 capsule (500 mg) by mouth 2 times daily    Skin ulcer of left foot, limited to breakdown of skin (H), Type II or unspecified type diabetes mellitus with neurological manifestations, not stated as uncontrolled(250.60) (H), Diabetes mellitus with peripheral vascular disease (H)       * clopidogrel 75 MG tablet    PLAVIX    30 tablet    Take 1 tablet (75 mg) by mouth daily    Peripheral vascular disease, unspecified       * clopidogrel 75 MG tablet    PLAVIX    30 tablet    Take 1 tablet (75 mg) by mouth daily    Peripheral vascular  "disease (H)       ferrous sulfate 325 (65 Fe) MG tablet    IRON    60 tablet    Take 1 tablet (325 mg) by mouth 2 times daily    Peripheral vascular disease, unspecified       gentamicin 0.1 % cream    GARAMYCIN    30 g    Apply topically daily To right leg ulcer.    Ulcer of right lower leg, with fat layer exposed (H), Chronic venous hypertension with ulcer involving right side (H), Type 2 diabetes, controlled, with neuropathy (H)       insulin glargine 100 UNIT/ML injection    LANTUS SOLOSTAR    3 mL    Inject 16 Units Subcutaneous daily (with dinner) May take up to 22 units daily    Type 2 diabetes mellitus with diabetic peripheral angiopathy without gangrene, with long-term current use of insulin (H)       * insulin pen needle 31G X 8 MM    B-D U/F    100 each    Use 1 daily o as directed    Diabetes mellitus, type II (H)       * B-D U/F 31G X 5 MM   Generic drug:  insulin pen needle      USE ONCE D OR UTD        lisinopril 10 MG tablet    PRINIVIL/ZESTRIL    90 tablet    Take 1 tablet (10 mg) by mouth daily    Benign essential hypertension       * nateglinide 120 MG tablet    STARLIX    90 tablet    TAKE 1 TABLET BY MOUTH THREE TIMES DAILY BEFORE MEALS    Type 2 diabetes, controlled, with neuropathy (H)       * nateglinide 120 MG tablet    STARLIX    90 tablet    Take 1 tablet (120 mg) by mouth 3 times daily (before meals)    Diabetes mellitus (H)       ONETOUCH ULTRA test strip   Generic drug:  blood glucose monitoring     200 strip    USE TO TEST TWICE DAILY OR AS DIRECTED    Type 2 diabetes mellitus (H)       OPTIFOAM 6\"X6\" Pads     1 each    1 Box once a week    Ulcer of right leg, with fat layer exposed (H)       order for DME     2 each    Please measure and distribute 1 pair of 20mm Hg - 30mm Hg knee high ULCER CARE open or closed toe compression stockings.  Patient has a size 13 foot and please take this into consideration.  Jobst or equivalent    Varicose veins of lower extremities with other " complications, Venous stasis ulcer of right lower extremity (H)       order for DME     3 each    Please measure and distribute 1 pair of 20mmHg - 30mmHg knee high open or closed toe compression stockings. Jobst ultrasheer or equivalent.    Varicose veins of both lower extremities with complications       order for DME     2 each    Please measure and distribute 1 pair of 30mmHg - 40mmHg knee high open toe ulcercare compression stockings. Jobst ultrasheer or equivalent.    Varicose veins of bilateral lower extremities with other complications       sildenafil 50 MG tablet    VIAGRA    10 tablet    Take 1 tablet (50 mg) by mouth daily as needed for erectile dysfunction    Vasculogenic erectile dysfunction, unspecified vasculogenic erectile dysfunction type       silver sulfADIAZINE 1 % cream    SILVADENE    85 g    Apply topically daily To affected areas on right foot and leg.    Ulcer of right lower leg, with fat layer exposed (H), Chronic venous hypertension with ulcer involving right side (H), Type 2 diabetes, controlled, with neuropathy (H)       simvastatin 10 MG tablet    ZOCOR    90 tablet    Take 1 tablet (10 mg) by mouth At Bedtime    Type 2 diabetes, controlled, with neuropathy (H)       triamcinolone 0.1 % cream    KENALOG    30 g    Apply sparingly to left heel daily.    Dermatitis of left foot       VITAMIN C PO      Take 1,000 mg by mouth        VITAMIN D (CHOLECALCIFEROL) PO      Take 1,000 Units by mouth 2 times daily        * Notice:  This list has 6 medication(s) that are the same as other medications prescribed for you. Read the directions carefully, and ask your doctor or other care provider to review them with you.

## 2018-07-31 ENCOUNTER — OFFICE VISIT (OUTPATIENT)
Dept: PODIATRY | Facility: CLINIC | Age: 73
End: 2018-07-31
Payer: MEDICARE

## 2018-07-31 VITALS
TEMPERATURE: 98.1 F | DIASTOLIC BLOOD PRESSURE: 71 MMHG | OXYGEN SATURATION: 98 % | HEART RATE: 105 BPM | SYSTOLIC BLOOD PRESSURE: 151 MMHG

## 2018-07-31 DIAGNOSIS — L97.521 SKIN ULCER OF LEFT FOOT, LIMITED TO BREAKDOWN OF SKIN (H): ICD-10-CM

## 2018-07-31 DIAGNOSIS — L97.912 ULCER OF RIGHT LOWER EXTREMITY WITH FAT LAYER EXPOSED (H): Primary | ICD-10-CM

## 2018-07-31 DIAGNOSIS — L97.512 SKIN ULCER OF RIGHT FOOT WITH FAT LAYER EXPOSED (H): ICD-10-CM

## 2018-07-31 DIAGNOSIS — E11.51 DIABETES MELLITUS WITH PERIPHERAL VASCULAR DISEASE (H): ICD-10-CM

## 2018-07-31 DIAGNOSIS — E11.49 TYPE II OR UNSPECIFIED TYPE DIABETES MELLITUS WITH NEUROLOGICAL MANIFESTATIONS, NOT STATED AS UNCONTROLLED(250.60) (H): ICD-10-CM

## 2018-07-31 PROCEDURE — 99213 OFFICE O/P EST LOW 20 MIN: CPT | Performed by: PODIATRIST
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
## 2018-07-31 NOTE — PATIENT INSTRUCTIONS
Thanks for coming today.  Ortho/Sports Medicine Clinic  [address] [place] [address]    To schedule future appointments in Ortho Clinic, you may call [phone].    To schedule ordered imaging by your Provider: Call [facility] at [phone]    [employer] available online at:   [email or web address]    Please call if any further questions or concerns [phone] and ask for the Orthopedic Department. Clinic hours 8 am to 5 pm.    Return to clinic if symptoms worsen.

## 2018-07-31 NOTE — PROGRESS NOTES
Cardiac Rehab Discharge Summary    Reason for discharge:    Medicare G-code: Patient did not attend a final scheduled session prior to discharge. Unable to determine discharge functional status. Pt came for 30 sessions, he insisted on cont. 1time week after progress in ex. plateaued to reach other goals of regaining confidence in exercising regularly. He then cancelled multple sessions due to an injured foot.     Progress towards goals:  Initially pt tolerated 10 min bouts of non wt bearing ex. at 2.5 METs he progressed to tolerating 30 min of combined walking and non wt bearing ex at 3.5 METs. Pt declined all opportunities to participate in education on risk factors.     Recommendation(s):    Continue to exercise as per instructed in past by cardiac rehab. No opportunity to provide final home program instruction.

## 2018-07-31 NOTE — NURSING NOTE
Amos Walker's chief complaint for this visit includes:  Chief Complaint   Patient presents with     RECHECK     Recheck right leg     PCP: Racheal Swift    Referring Provider:  No referring provider defined for this encounter.    /71  Pulse 105  Temp 98.1  F (36.7  C) (Oral)  SpO2 98%  Data Unavailable     Do you need any medication refills at today's visit? no

## 2018-07-31 NOTE — ADDENDUM NOTE
Encounter addended by: Katy Acosta OT on: 7/31/2018  5:09 PM<BR>     Actions taken: Sign clinical note, Episode resolved

## 2018-07-31 NOTE — MR AVS SNAPSHOT
After Visit Summary   7/31/2018    Amos Walker    MRN: [id number]           Patient Information     Date Of Birth          1945        Visit Information        Provider Department      7/31/2018 3:00 PM Brayan Mcclain DPM [facility]        Care Instructions    Thanks for coming today.  Ortho/Sports Medicine Clinic  [address] [place] [address]    To schedule future appointments in Ortho Clinic, you may call [phone].    To schedule ordered imaging by your Provider: Call [facility] at [phone]    [employer] available online at:   [employer]    Please call if any further questions or concerns [phone] and ask for the Orthopedic Department. Clinic hours 8 am to 5 pm.    Return to clinic if symptoms worsen.            Follow-ups after your visit        Your next 10 appointments already scheduled     Aug 14, 2018  2:30 PM CDT   Return Visit with Brayan Mcclain DPM   [facility] ([facility])    [address] [place] [address] [phone]            Aug 15, 2018 12:45 PM CDT   (Arrive by 12:30 PM)   RETURN HIP with Ish Jackman MD   [facility] ([facility])    [address] [place] [address] [phone]            Aug 16, 2018  8:00 AM CDT   (Arrive by 7:45 AM)   Hearing Aid Evaluation with Eddie Jane   [facility] ([facility])    [address] [place] [address] [phone]           Please see your medical professional for ear cleaning prior to this appointment if you believe wax buildup may be an issue. All patients are required to have a physician's order stating the medical reason for the hearing test. Your doctor can send an electronic order, use their own form or we have provided a form (called Physician's Order for  Audiology Services). It states that there is a medical reason for your exam. Without an order you may need to be rescheduled until the order can be obtained.            Aug 21, 2018  2:30 PM CDT   Return Visit with Brayan Mcclain DPM   [facility] ([facility])    [address] [place] [address]   [phone]            Aug 28, 2018  2:30 PM CDT   Return Visit with Brayan Mcclain DPM   [facility] ([facility])    [address] [place] [address]   [phone]            Sep 04, 2018  9:30 AM CDT   Return Visit with Brayan Mcclain DPM   [facility] ([facility])    [address] [place] [address]   [phone]            Sep 06, 2018 11:30 AM CDT   US UPPER EXTREMITY VENOUS MAPPING BILATERAL with UCUSV2   [facility] ([facility])    [address] [place] [id number] [phone]           Please bring a list of your medicines (including vitamins, minerals and over-the-counter drugs). Also, tell your doctor about any allergies you may have. Wear comfortable clothes and leave your valuables at home.  You do not need to do anything special to prepare for your exam.  Please call the Imaging Department at your exam site with any questions.            Sep 06, 2018  1:00 PM CDT   (Arrive by 12:45 PM)   New Vascular Visit with Augusto Maharaj MD   [facility] ([facility])    [address] [place] [id number] [phone]            Sep 11, 2018  9:30 AM CDT   Return Visit with Brayan Mcclain DPM   [facility] ([facility])    [address] [place] [address]   [phone]            Sep 18, 2018  9:30 AM CDT   Return Visit with Brayan Mcclain DPM   [facility] (  [facility]    [address] [place] [address] [phone]              Who to contact     If you have questions or need follow up information about today's clinic visit or your schedule please contact [facility] directly at [phone].  Normal or non-critical lab and imaging results will be communicated to you by MyChart, letter or phone within 4 business days after the clinic has received the results. If you do not hear from us within 7 days, please contact the clinic through [employer] or phone. If you have a critical or abnormal lab result, we will notify you by phone as soon as possible.  Submit refill requests through [employer] or call your pharmacy and they will forward the refill request to us. Please allow 3 business days for your refill to be completed.          Additional Information About Your Visit        [employer] Information     [employer] gives you secure access to your electronic health record. If you see a primary care provider, you can also send messages to your care team and make appointments. If you have questions, please call your primary care clinic.  If you do not have a primary care provider, please call [phone] and they will assist you.      [employer] is an electronic gateway that provides easy, online access to your medical records. With [employer], you can request a clinic appointment, read your test results, renew a prescription or communicate with your care team.     To access your existing account, please contact your [facility] or call [phone] for assistance.        Care EveryWhere ID     This is your Care EveryWhere ID. This could be used by other organizations to access your [facility] medical records  [id number]        Your Vitals Were     Pulse Temperature Pulse Oximetry             105 98.1  F (36.7  C) (Oral) 98%          Blood Pressure from Last 3 Encounters:   07/31/18 151/71   07/26/18 150/66    07/24/18 126/61    Weight from Last 3 Encounters:   06/29/18 76.7 kg (169 lb 1.6 oz)   06/19/18 76.7 kg (169 lb)   06/08/18 76.7 kg (169 lb)              Today, you had the following     No orders found for display         Today's Medication Changes          These changes are accurate as of 7/31/18  3:02 PM.  If you have any questions, ask your nurse or doctor.               These medicines have changed or have updated prescriptions.        Dose/Directions    * clopidogrel 75 MG tablet   Commonly known as:  PLAVIX   This may have changed:  when to take this   Used for:  Peripheral vascular disease, unspecified        Dose:  75 mg   Take 1 tablet (75 mg) by mouth daily   Quantity:  30 tablet   Refills:  11       * clopidogrel 75 MG tablet   Commonly known as:  PLAVIX   This may have changed:  Another medication with the same name was changed. Make sure you understand how and when to take each.   Used for:  Peripheral vascular disease (H)        Dose:  75 mg   Take 1 tablet (75 mg) by mouth daily   Quantity:  30 tablet   Refills:  3       gentamicin 0.1 % cream   Commonly known as:  GARAMYCIN   This may have changed:    - when to take this  - reasons to take this  - additional instructions   Used for:  Ulcer of right lower leg, with fat layer exposed (H), Chronic venous hypertension with ulcer involving right side (H), Type 2 diabetes, controlled, with neuropathy (H)        Apply topically daily To right leg ulcer.   Quantity:  30 g   Refills:  5       * Notice:  This list has 2 medication(s) that are the same as other medications prescribed for you. Read the directions carefully, and ask your doctor or other care provider to review them with you.             Primary Care Provider Office Phone # Fax #    Racheal Swift [phone] [address] [address]        Equal Access to Services     SAMSON MELTON AH: miguel Pineda qaybta  yolanda trevino donovankristie cabellodamion duran ah. [facility] [phone].    ATENCIÓN: Si pancho wagner, tiene a rodrigues disposición servicios gratuitos de asistencia lingüística. Mary al [phone].    We comply with applicable federal civil rights laws and [place] laws. We do not discriminate on the basis of race, color, national origin, age, disability, sex, sexual orientation, or gender identity.            Thank you!     Thank you for choosing [facility]  for your care. Our goal is always to provide you with excellent care. Hearing back from our patients is one way we can continue to improve our services. Please take a few minutes to complete the written survey that you may receive in the mail after your visit with us. Thank you!             Your Updated Medication List - Protect others around you: Learn how to safely use, store and throw away your medicines at www.disposemymeds.org.          This list is accurate as of 7/31/18  3:02 PM.  Always use your most recent med list.                   Brand Name Dispense Instructions for use Diagnosis    ammonium lactate 12 % cream    LAC-HYDRIN    385 g    Apply topically 2 times daily as needed for dry skin    Venous stasis, Type 2 diabetes, controlled, with neuropathy (H)       ascorbic acid 500 MG Tabs     30 tablet    Take 1 tablet (500 mg) by mouth 2 times daily    Ulcer of right lower leg, with fat layer exposed (H)       ASPIRIN PO      Take 81 mg by mouth daily        blood glucose monitoring lancets     3 Box    Use to test blood sugars 2 as directed.    Type 2 diabetes, uncontrolled, with neuropathy (H)       Blood Pressure Kit     1 kit    1 Device daily    Benign essential hypertension       cefadroxil 500 MG capsule    DURICEF    20 capsule    Take 1 capsule (500 mg) by mouth 2 times daily    Skin ulcer of left foot, limited to breakdown of skin (H), Type II or unspecified type diabetes mellitus with neurological  "manifestations, not stated as uncontrolled(250.60) (H), Diabetes mellitus with peripheral vascular disease (H)       * clopidogrel 75 MG tablet    PLAVIX    30 tablet    Take 1 tablet (75 mg) by mouth daily    Peripheral vascular disease, unspecified       * clopidogrel 75 MG tablet    PLAVIX    30 tablet    Take 1 tablet (75 mg) by mouth daily    Peripheral vascular disease (H)       ferrous sulfate 325 (65 Fe) MG tablet    IRON    60 tablet    Take 1 tablet (325 mg) by mouth 2 times daily    Peripheral vascular disease, unspecified       gentamicin 0.1 % cream    GARAMYCIN    30 g    Apply topically daily To right leg ulcer.    Ulcer of right lower leg, with fat layer exposed (H), Chronic venous hypertension with ulcer involving right side (H), Type 2 diabetes, controlled, with neuropathy (H)       insulin glargine 100 UNIT/ML injection    LANTUS SOLOSTAR    3 mL    Inject 16 Units Subcutaneous daily (with dinner) May take up to 22 units daily    Type 2 diabetes mellitus with diabetic peripheral angiopathy without gangrene, with long-term current use of insulin (H)       * insulin pen needle 31G X 8 MM    B-D U/F    100 each    Use 1 daily o as directed    Diabetes mellitus, type II (H)       * B-D U/F 31G X 5 MM   Generic drug:  insulin pen needle      USE ONCE D OR UTD        lisinopril 10 MG tablet    PRINIVIL/ZESTRIL    90 tablet    Take 1 tablet (10 mg) by mouth daily    Benign essential hypertension       * nateglinide 120 MG tablet    STARLIX    90 tablet    TAKE 1 TABLET BY MOUTH THREE TIMES DAILY BEFORE MEALS    Type 2 diabetes, controlled, with neuropathy (H)       * nateglinide 120 MG tablet    STARLIX    90 tablet    Take 1 tablet (120 mg) by mouth 3 times daily (before meals)    Diabetes mellitus (H)       ONETOUCH ULTRA test strip   Generic drug:  blood glucose monitoring     200 strip    USE TO TEST TWICE DAILY OR AS DIRECTED    Type 2 diabetes mellitus (H)       OPTIFOAM 6\"X6\" Pads     1 each    1 " Box once a week    Ulcer of right leg, with fat layer exposed (H)       order for DME     2 each    Please measure and distribute 1 pair of 20mm Hg - 30mm Hg knee high ULCER CARE open or closed toe compression stockings.  Patient has a size 13 foot and please take this into consideration.  Jobst or equivalent    Varicose veins of lower extremities with other complications, Venous stasis ulcer of right lower extremity (H)       order for DME     3 each    Please measure and distribute 1 pair of 20mmHg - 30mmHg knee high open or closed toe compression stockings. Jobst ultrasheer or equivalent.    Varicose veins of both lower extremities with complications       order for DME     2 each    Please measure and distribute 1 pair of 30mmHg - 40mmHg knee high open toe ulcercare compression stockings. Jobst ultrasheer or equivalent.    Varicose veins of bilateral lower extremities with other complications       sildenafil 50 MG tablet    VIAGRA    10 tablet    Take 1 tablet (50 mg) by mouth daily as needed for erectile dysfunction    Vasculogenic erectile dysfunction, unspecified vasculogenic erectile dysfunction type       silver sulfADIAZINE 1 % cream    SILVADENE    85 g    Apply topically daily To affected areas on right foot and leg.    Ulcer of right lower leg, with fat layer exposed (H), Chronic venous hypertension with ulcer involving right side (H), Type 2 diabetes, controlled, with neuropathy (H)       simvastatin 10 MG tablet    ZOCOR    90 tablet    Take 1 tablet (10 mg) by mouth At Bedtime    Type 2 diabetes, controlled, with neuropathy (H)       triamcinolone 0.1 % cream    KENALOG    30 g    Apply sparingly to left heel daily.    Dermatitis of left foot       VITAMIN C PO      Take 1,000 mg by mouth        VITAMIN D (CHOLECALCIFEROL) PO      Take 1,000 Units by mouth 2 times daily        * Notice:  This list has 6 medication(s) that are the same as other medications prescribed for you. Read the directions  carefully, and ask your doctor or other care provider to review them with you.
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
## 2018-08-01 DIAGNOSIS — S72.009A: Primary | ICD-10-CM

## 2018-08-06 DIAGNOSIS — E11.9 DIABETES MELLITUS, TYPE II (H): ICD-10-CM

## 2018-08-09 ENCOUNTER — MYC REFILL (OUTPATIENT)
Dept: ORTHOPEDICS | Facility: CLINIC | Age: 73
End: 2018-08-09

## 2018-08-09 DIAGNOSIS — L30.9 DERMATITIS OF LEFT FOOT: ICD-10-CM

## 2018-08-12 RX ORDER — TRIAMCINOLONE ACETONIDE 1 MG/G
CREAM TOPICAL
Qty: 30 G | Refills: 1 | Status: SHIPPED | OUTPATIENT
Start: 2018-08-12 | End: 2018-08-14

## 2018-08-14 ENCOUNTER — OFFICE VISIT (OUTPATIENT)
Dept: PODIATRY | Facility: CLINIC | Age: 73
End: 2018-08-14
Payer: MEDICARE

## 2018-08-14 VITALS
TEMPERATURE: 98.4 F | OXYGEN SATURATION: 99 % | SYSTOLIC BLOOD PRESSURE: 140 MMHG | HEART RATE: 89 BPM | DIASTOLIC BLOOD PRESSURE: 59 MMHG

## 2018-08-14 DIAGNOSIS — L97.912 ULCER OF RIGHT LOWER EXTREMITY WITH FAT LAYER EXPOSED (H): Primary | ICD-10-CM

## 2018-08-14 DIAGNOSIS — E11.40 TYPE 2 DIABETES, CONTROLLED, WITH NEUROPATHY (H): ICD-10-CM

## 2018-08-14 DIAGNOSIS — E11.51 DIABETES MELLITUS WITH PERIPHERAL VASCULAR DISEASE (H): ICD-10-CM

## 2018-08-14 DIAGNOSIS — E11.49 TYPE II OR UNSPECIFIED TYPE DIABETES MELLITUS WITH NEUROLOGICAL MANIFESTATIONS, NOT STATED AS UNCONTROLLED(250.60) (H): ICD-10-CM

## 2018-08-14 DIAGNOSIS — L97.912 ULCER OF RIGHT LOWER LEG, WITH FAT LAYER EXPOSED (H): ICD-10-CM

## 2018-08-14 DIAGNOSIS — L97.512 SKIN ULCER OF RIGHT FOOT WITH FAT LAYER EXPOSED (H): ICD-10-CM

## 2018-08-14 DIAGNOSIS — L30.9 DERMATITIS OF LEFT FOOT: ICD-10-CM

## 2018-08-14 PROCEDURE — 99213 OFFICE O/P EST LOW 20 MIN: CPT | Performed by: PODIATRIST

## 2018-08-14 RX ORDER — TRIAMCINOLONE ACETONIDE 1 MG/G
CREAM TOPICAL
Qty: 60 G | Refills: 1 | Status: SHIPPED | OUTPATIENT
Start: 2018-08-14 | End: 2018-11-28

## 2018-08-14 RX ORDER — SILVER SULFADIAZINE 10 MG/G
CREAM TOPICAL DAILY
Qty: 85 G | Refills: 5 | Status: SHIPPED | OUTPATIENT
Start: 2018-08-14 | End: 2019-04-29

## 2018-08-14 NOTE — PATIENT INSTRUCTIONS
Thanks for coming today.  Ortho/Sports Medicine Clinic  [address] [place] [address]    To schedule future appointments in Ortho Clinic, you may call [phone].    To schedule ordered imaging by your Provider: Call [facility] at [phone]    [email or web address] available online at:   [email or web address]    Please call if any further questions or concerns [phone] and ask for the Orthopedic Department. Clinic hours 8 am to 5 pm.    Return to clinic if symptoms worsen.

## 2018-08-14 NOTE — MR AVS SNAPSHOT
After Visit Summary   8/14/2018    Amos Walker    MRN: [id number]           Patient Information     Date Of Birth          1945        Visit Information        Provider Department      8/14/2018 2:30 PM Brayan Mcclain DPM [facility]        Today's Diagnoses     Ulcer of right lower extremity with fat layer exposed (H)    -  1    Skin ulcer of right foot with fat layer exposed (H)        Type II or unspecified type diabetes mellitus with neurological manifestations, not stated as uncontrolled(250.60) (H)        Diabetes mellitus with peripheral vascular disease (H)          Care Instructions    Thanks for coming today.  Ortho[facility] Clinic  [address] [place] [address]    To schedule future appointments in Ortho Clinic, you may call [phone].    To schedule ordered imaging by your Provider: Call [facility] at [phone]    [employer] available online at:   [email or web address]    Please call if any further questions or concerns [phone] and ask for the Orthopedic Department. Clinic hours 8 am to 5 pm.    Return to clinic if symptoms worsen.            Follow-ups after your visit        Your next 10 appointments already scheduled     Aug 15, 2018 12:25 PM CDT   XR PELVIS AND HIP LEFT 1 VIEW with [email or web address]   [facility] ([facility])    [address] [place] [address] [phone]           Please bring a list of your current medicines to your exam. (Include vitamins, minerals and over-thecounter medicines.) Leave your valuables at home.  Tell your doctor if there is a chance you may be pregnant.  You do not need to do anything special for this exam.            Aug 15, 2018 12:45 PM CDT   (Arrive by 12:30 PM)   RETURN HIP with Ish Jackman MD   [facility] ([facility])    [address] [place] [address]  [id number] [phone]            Aug 16, 2018  8:00 AM CDT   (Arrive by 7:45 AM)   Hearing Aid Evaluation with Eddie Jane   [facility] ([facility])    [address]  [address] [facility] [id number] [phone]           Please see your medical professional for ear cleaning prior to this appointment if you believe wax buildup may be an issue. All patients are required to have a physician's order stating the medical reason for the hearing test. Your doctor can send an electronic order, use their own form or we have provided a form (called Physician's Order for Audiology Services). It states that there is a medical reason for your exam. Without an order you may need to be rescheduled until the order can be obtained.            Aug 21, 2018  2:30 PM CDT   Return Visit with Brayan Mcclain DPM   [facility]    [address] [place] [address]   [phone]            Aug 28, 2018  2:30 PM CDT   Return Visit with Brayan Mcclain DPM   [facility] ([facility])    [address] [place] [address]   [phone]            Sep 04, 2018  9:30 AM CDT   Return Visit with Brayan Mcclain DPM   [facility] ([facility])    [address] [place] [address]   [phone]            Sep 06, 2018 11:30 AM CDT   US UPPER EXTREMITY VENOUS MAPPING BILATERAL with UCUSV2   [facility] ([facility])    [address]  [address] [facility] [id number] [phone]           Please bring a list of your medicines (including vitamins, minerals and over-the-counter drugs). Also, tell your doctor about any allergies you may have. Wear comfortable clothes and leave your valuables at home.  You do not need to do anything special to prepare for your exam.  Please call  the Imaging Department at your exam site with any questions.            Sep 06, 2018  1:00 PM CDT   (Arrive by 12:45 PM)   New Vascular Visit with Augusto Maharaj MD   [facility] ([facility])    [address]  [address] [facility] [address]   [phone]            Sep 11, 2018  9:30 AM CDT   Return Visit with Brayan Mcclain DPM   [facility]    [address] [place] [address] [phone]            Sep 18, 2018  9:30 AM CDT   Return Visit with Brayan Mcclain DPM   [facility] ([facility])    [address] [place] [address] [phone]              Who to contact     If you have questions or need follow up information about today's clinic visit or your schedule please contact [facility] directly at [phone].  Normal or non-critical lab and imaging results will be communicated to you by [employer], letter or phone within 4 business days after the clinic has received the results. If you do not hear from us within 7 days, please contact the clinic through [employer] or phone. If you have a critical or abnormal lab result, we will notify you by phone as soon as possible.  Submit refill requests through [facility] or call your pharmacy and they will forward the refill request to us. Please allow 3 business days for your refill to be completed.          Additional Information About Your Visit        [employer] Information     [facility] gives you secure access to your electronic health record. If you see a primary care provider, you can also send messages to your care team and make appointments. If you have questions, please call your primary care clinic.  If you do not have a primary care provider, please call [phone] and they will assist you.      [facility] is an electronic gateway that provides easy, online access to your  medical records. With [employer], you can request a clinic appointment, read your test results, renew a prescription or communicate with your care team.     To access your existing account, please contact your [facility] or call [phone] for assistance.        Care EveryWhere ID     This is your Care EveryWhere ID. This could be used by other organizations to access your [place] medical records  [id number]        Your Vitals Were     Pulse Temperature Pulse Oximetry             89 98.4  F (36.9  C) (Oral) 99%          Blood Pressure from Last 3 Encounters:   08/14/18 140/59   07/31/18 151/71   07/26/18 150/66    Weight from Last 3 Encounters:   06/29/18 76.7 kg (169 lb 1.6 oz)   06/19/18 76.7 kg (169 lb)   06/08/18 76.7 kg (169 lb)              Today, you had the following     No orders found for display         Today's Medication Changes          These changes are accurate as of 8/14/18  3:00 PM.  If you have any questions, ask your nurse or doctor.               These medicines have changed or have updated prescriptions.        Dose/Directions    * clopidogrel 75 MG tablet   Commonly known as:  PLAVIX   This may have changed:  when to take this   Used for:  Peripheral vascular disease, unspecified        Dose:  75 mg   Take 1 tablet (75 mg) by mouth daily   Quantity:  30 tablet   Refills:  11       * clopidogrel 75 MG tablet   Commonly known as:  PLAVIX   This may have changed:  Another medication with the same name was changed. Make sure you understand how and when to take each.   Used for:  Peripheral vascular disease (H)        Dose:  75 mg   Take 1 tablet (75 mg) by mouth daily   Quantity:  30 tablet   Refills:  3       gentamicin 0.1 % cream   Commonly known as:  GARAMYCIN   This may have changed:    - when to take this  - reasons to take this  - additional instructions   Used for:  Ulcer of right lower leg, with fat layer exposed (H), Chronic venous hypertension with ulcer  involving right side (H), Type 2 diabetes, controlled, with neuropathy (H)        Apply topically daily To right leg ulcer.   Quantity:  30 g   Refills:  5       * Notice:  This list has 2 medication(s) that are the same as other medications prescribed for you. Read the directions carefully, and ask your doctor or other care provider to review them with you.             Primary Care Provider Office Phone # Fax #    Racheal Swift [phone]       [address] [address]        Equal Access to Services     [facility] : Hadii niurka ku hadasho Soomaali, waaxda luqadaha, qaybta kaalmada adeegyada, waxanna duran . So [facility] [phone].    ATENCIÓN: Si habla español, tiene a rodrigues disposición servicios gratuitos de asistencia lingüística. Llame al [phone].    We comply with applicable federal civil rights laws and [place] laws. We do not discriminate on the basis of race, color, national origin, age, disability, sex, sexual orientation, or gender identity.            Thank you!     Thank you for choosing [facility]  for your care. Our goal is always to provide you with excellent care. Hearing back from our patients is one way we can continue to improve our services. Please take a few minutes to complete the written survey that you may receive in the mail after your visit with us. Thank you!             Your Updated Medication List - Protect others around you: Learn how to safely use, store and throw away your medicines at www.disposemymeds.org.          This list is accurate as of 8/14/18  3:00 PM.  Always use your most recent med list.                   Brand Name Dispense Instructions for use Diagnosis    ammonium lactate 12 % cream    LAC-HYDRIN    385 g    Apply topically 2 times daily as needed for dry skin    Venous stasis, Type 2 diabetes, controlled, with neuropathy (H)       ascorbic acid 500 MG Tabs     30 tablet     Take 1 tablet (500 mg) by mouth 2 times daily    Ulcer of right lower leg, with fat layer exposed (H)       ASPIRIN PO      Take 81 mg by mouth daily        blood glucose monitoring lancets     3 Box    Use to test blood sugars 2 as directed.    Type 2 diabetes, uncontrolled, with neuropathy (H)       Blood Pressure Kit     1 kit    1 Device daily    Benign essential hypertension       cefadroxil 500 MG capsule    DURICEF    20 capsule    Take 1 capsule (500 mg) by mouth 2 times daily    Skin ulcer of left foot, limited to breakdown of skin (H), Type II or unspecified type diabetes mellitus with neurological manifestations, not stated as uncontrolled(250.60) (H), Diabetes mellitus with peripheral vascular disease (H)       * clopidogrel 75 MG tablet    PLAVIX    30 tablet    Take 1 tablet (75 mg) by mouth daily    Peripheral vascular disease, unspecified       * clopidogrel 75 MG tablet    PLAVIX    30 tablet    Take 1 tablet (75 mg) by mouth daily    Peripheral vascular disease (H)       ferrous sulfate 325 (65 Fe) MG tablet    IRON    60 tablet    Take 1 tablet (325 mg) by mouth 2 times daily    Peripheral vascular disease, unspecified       gentamicin 0.1 % cream    GARAMYCIN    30 g    Apply topically daily To right leg ulcer.    Ulcer of right lower leg, with fat layer exposed (H), Chronic venous hypertension with ulcer involving right side (H), Type 2 diabetes, controlled, with neuropathy (H)       insulin glargine 100 UNIT/ML injection    LANTUS SOLOSTAR    3 mL    Inject 16 Units Subcutaneous daily (with dinner) May take up to 22 units daily    Type 2 diabetes mellitus with diabetic peripheral angiopathy without gangrene, with long-term current use of insulin (H)       insulin pen needle 31G X 8 MM    B-D U/F    100 each    Use 1 daily as directed    Diabetes mellitus, type II (H)       lisinopril 10 MG tablet    PRINIVIL/ZESTRIL    90 tablet    Take 1 tablet (10 mg) by mouth daily    Benign essential  "hypertension       * nateglinide 120 MG tablet    STARLIX    90 tablet    TAKE 1 TABLET BY MOUTH THREE TIMES DAILY BEFORE MEALS    Type 2 diabetes, controlled, with neuropathy (H)       * nateglinide 120 MG tablet    STARLIX    90 tablet    Take 1 tablet (120 mg) by mouth 3 times daily (before meals)    Diabetes mellitus (H)       ONETOUCH ULTRA test strip   Generic drug:  blood glucose monitoring     200 strip    USE TO TEST TWICE DAILY OR AS DIRECTED    Type 2 diabetes mellitus (H)       OPTIFOAM 6\"X6\" Pads     1 each    1 Box once a week    Ulcer of right leg, with fat layer exposed (H)       order for DME     2 each    Please measure and distribute 1 pair of 20mm Hg - 30mm Hg knee high ULCER CARE open or closed toe compression stockings.  Patient has a size 13 foot and please take this into consideration.  Jobst or equivalent    Varicose veins of lower extremities with other complications, Venous stasis ulcer of right lower extremity (H)       order for DME     3 each    Please measure and distribute 1 pair of 20mmHg - 30mmHg knee high open or closed toe compression stockings. Jobst ultrasheer or equivalent.    Varicose veins of both lower extremities with complications       order for DME     2 each    Please measure and distribute 1 pair of 30mmHg - 40mmHg knee high open toe ulcercare compression stockings. Jobst ultrasheer or equivalent.    Varicose veins of bilateral lower extremities with other complications       sildenafil 50 MG tablet    VIAGRA    10 tablet    Take 1 tablet (50 mg) by mouth daily as needed for erectile dysfunction    Vasculogenic erectile dysfunction, unspecified vasculogenic erectile dysfunction type       silver sulfADIAZINE 1 % cream    SILVADENE    85 g    Apply topically daily To affected areas on right foot and leg.    Ulcer of right lower leg, with fat layer exposed (H), Chronic venous hypertension with ulcer involving right side (H), Type 2 diabetes, controlled, with neuropathy " (H)       simvastatin 10 MG tablet    ZOCOR    90 tablet    Take 1 tablet (10 mg) by mouth At Bedtime    Type 2 diabetes, controlled, with neuropathy (H)       triamcinolone 0.1 % cream    KENALOG    30 g    Apply sparingly to left heel daily.    Dermatitis of left foot       VITAMIN C PO      Take 1,000 mg by mouth        VITAMIN D (CHOLECALCIFEROL) PO      Take 1,000 Units by mouth 2 times daily        * Notice:  This list has 4 medication(s) that are the same as other medications prescribed for you. Read the directions carefully, and ask your doctor or other care provider to review them with you.

## 2018-08-14 NOTE — NURSING NOTE
Amos Walker's chief complaint for this visit includes:  Chief Complaint   Patient presents with     RECHECK     Right leg recheck      PCP: Racheal Swift    Referring Provider:  No referring provider defined for this encounter.    /59  Pulse 89  Temp 98.4  F (36.9  C) (Oral)  SpO2 99%  Data Unavailable     Do you need any medication refills at today's visit? no
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
## 2018-08-15 ENCOUNTER — RADIANT APPOINTMENT (OUTPATIENT)
Dept: GENERAL RADIOLOGY | Facility: CLINIC | Age: 73
End: 2018-08-15
Attending: ORTHOPAEDIC SURGERY
Payer: MEDICARE

## 2018-08-15 ENCOUNTER — OFFICE VISIT (OUTPATIENT)
Dept: ORTHOPEDICS | Facility: CLINIC | Age: 73
End: 2018-08-15
Payer: MEDICARE

## 2018-08-15 DIAGNOSIS — S72.002D CLOSED FRACTURE OF NECK OF LEFT FEMUR WITH ROUTINE HEALING, SUBSEQUENT ENCOUNTER: Primary | ICD-10-CM

## 2018-08-15 DIAGNOSIS — S72.009A: ICD-10-CM

## 2018-08-15 NOTE — PROGRESS NOTES
Interval History:   Amos Walker is a 72 year old male who is here follow up for:   Left AGUEDA - 8/27/2017    He has done very well over the past year following the hip replacement for femoral neck fracture. He has no problems related to the hip aside from intermittent soreness. He also notes some soreness on his right hip. He has had difficulties with a nonhealing wound of his right foot from his vascular insufficiency. He notes that the wound seems to be steadily improving.       Physical Exam:     NAD  AOx3  Interactive and cooperative with the exam.    He walks with a 9 trial jig gait. His incision remains well-healed. He has no pain with hip range of motion. He has no lateral thigh tenderness palpation.       Imaging:      Repeat imaging demonstrates the cemented stem total hip replacement. The implants appear to be well fixed and well positioned.       Assessment and Plan:    Amos is making excellent progress on his left total hip replacement. He will continue to progress activities as tolerated. I will see him back in clinic for a 5 year postoperative visit. He'll contact us if he has any questions or concerns prior to that. This point he has no restrictions or precautions and can move in whichever way he likes. the risk of dislocation is very low at this point.  Ish Jackman M.D.     Arthritis and Joint Replacement  Department of Orthopaedic Surgery, [facility]  [email or web address]  [phone] (pager)

## 2018-08-15 NOTE — LETTER
8/15/2018       RE: Amos Walker  [address]  [facility] [address]     Dear Colleague,    Thank you for referring your patient, Amos Walker, to the [facility]. Please see a copy of my visit note below.           Interval History:   Amos Walker is a 72 year old male who is here follow up for:   Left AGUEDA - 8/27/2017    He has done very well over the past year following the hip replacement for femoral neck fracture. He has no problems related to the hip aside from intermittent soreness. He also notes some soreness on his right hip. He has had difficulties with a nonhealing wound of his right foot from his vascular insufficiency. He notes that the wound seems to be steadily improving.       Physical Exam:     NAD  AOx3  Interactive and cooperative with the exam.    He walks with a 9 trial jig gait. His incision remains well-healed. He has no pain with hip range of motion. He has no lateral thigh tenderness palpation.       Imaging:      Repeat imaging demonstrates the cemented stem total hip replacement. The implants appear to be well fixed and well positioned.       Assessment and Plan:    Amos is making excellent progress on his left total hip replacement. He will continue to progress activities as tolerated. I will see him back in clinic for a 5 year postoperative visit. He'll contact us if he has any questions or concerns prior to that. This point he has no restrictions or precautions and can move in whichever way he likes. the risk of dislocation is very low at this point.  Ish Jackman M.D.     Arthritis and Joint Replacement  Department of Orthopaedic Surgery, [facility]  [email or web address]  [phone] (pager)

## 2018-08-15 NOTE — NURSING NOTE
"Reason For Visit:   Chief Complaint   Patient presents with     Surgical Followup     Left AGUEDA - 8/27/2017       Primary MD: Racheal Swift      Pain Assessment  Patient Currently in Pain: Denies    Current Outpatient Prescriptions   Medication     ammonium lactate (LAC-HYDRIN) 12 % cream     Ascorbic Acid (VITAMIN C PO)     ascorbic acid 500 MG TABS     ASPIRIN PO     blood glucose monitoring (FREESTYLE) lancets     Blood Pressure KIT     cefadroxil (DURICEF) 500 MG capsule     clopidogrel (PLAVIX) 75 MG tablet     clopidogrel (PLAVIX) 75 MG tablet     ferrous sulfate (IRON) 325 (65 FE) MG tablet     Gauze Pads & Dressings (OPTIFOAM) 6\"X6\" PADS     gentamicin (GARAMYCIN) 0.1 % cream     insulin glargine (LANTUS SOLOSTAR) 100 UNIT/ML pen     insulin pen needle (B-D U/F) 31G X 8 MM     lisinopril (PRINIVIL/ZESTRIL) 10 MG tablet     nateglinide (STARLIX) 120 MG tablet     nateglinide (STARLIX) 120 MG tablet     ONETOUCH ULTRA test strip     order for DME     order for DME     order for DME     sildenafil (VIAGRA) 50 MG tablet     silver sulfADIAZINE (SILVADENE) 1 % cream     simvastatin (ZOCOR) 10 MG tablet     triamcinolone (KENALOG) 0.1 % cream     VITAMIN D, CHOLECALCIFEROL, PO     No current facility-administered medications for this visit.           Allergies   Allergen Reactions     Lisinopril Other (See Comments)     dizziness     Neomycin Other (See Comments)     Wound gets worse     Methylchloroisothiazolinone [Methylisothiazolinone] Rash     Povidone Iodine Rash           Makenna Wilson, MIGUE    "

## 2018-08-15 NOTE — MR AVS SNAPSHOT
After Visit Summary   8/15/2018    Amos Walker    MRN: [id number]           Patient Information     Date Of Birth          1945        Visit Information        Provider Department      8/15/2018 12:45 PM Ish Jackman MD [facility]        Today's Diagnoses     Closed fracture of neck of left femur with routine healing, subsequent encounter    -  1       Follow-ups after your visit        Your next 10 appointments already scheduled     Aug 16, 2018  8:00 AM CDT   (Arrive by 7:45 AM)   Hearing Aid Evaluation with Eddie Jane   [facility] ([facility])    [address] [place] [id number] [phone]           Please see your medical professional for ear cleaning prior to this appointment if you believe wax buildup may be an issue. All patients are required to have a physician's order stating the medical reason for the hearing test. Your doctor can send an electronic order, use their own form or we have provided a form (called Physician's Order for Audiology Services). It states that there is a medical reason for your exam. Without an order you may need to be rescheduled until the order can be obtained.            Aug 21, 2018  2:30 PM CDT   Return Visit with Brayan Mcclain DPM   [facility])    [address] [facility] [address]   [phone]            Aug 28, 2018  2:30 PM CDT   Return Visit with Brayan Mcclain DPM   [facility])    [address] [facility] [address]   [phone]            Sep 04, 2018  9:30 AM CDT   Return Visit with Brayan Mcclain DPM   [facility])    [address] [facility] [address]   [phone]            Sep 06, 2018 11:30 AM CDT   US UPPER EXTREMITY VENOUS MAPPING BILATERAL with UCUSV2    [facility]  ([facility])    [address] [place] [address]  [address] [facility] [id number] [phone]           Please bring a list of your medicines (including vitamins, minerals and over-the-counter drugs). Also, tell your doctor about any allergies you may have. Wear comfortable clothes and leave your valuables at home.  You do not need to do anything special to prepare for your exam.  Please call the Imaging Department at your exam site with any questions.            Sep 06, 2018  1:00 PM CDT   (Arrive by 12:45 PM)   New Vascular Visit with Augusto Maharaj MD   [facility] ([facility])    [address] [place] [address]  [address] [facility] [id number] [phone]            Sep 11, 2018  9:30 AM CDT   Return Visit with Brayan Mcclain DPM   [facility] ([facility])    [address] [place] [id number] [phone]            Sep 18, 2018  9:30 AM CDT   Return Visit with Brayan Mcclain DPM   [facility] ([facility])    [address] [place] [id number] [phone]            Sep 19, 2018   Procedure with Rickie Gautam MD   [facility] [place] ([facility])    [address] [address] [phone]           The [facility] is located on the corner of [address] and [address] [facility] on the [facility]. It is easily accessible from virtually any point in the [facility] area, via [address] and [address].            Nov 05, 2018  1:40 PM CST   (Arrive by 1:25 PM)   Return Visit with Racheal Swift MD   [facility] ([facility])    [address] [place] [address]  [address] [facility] [phone] [phone]              Who to contact     Please call your clinic at [phone] to:    Ask  questions about your health    Make or cancel appointments    Discuss your medicines    Learn about your test results    Speak to your doctor            Additional Information About Your Visit        TwitchharNudge Information     Tepha gives you secure access to your electronic health record. If you see a primary care provider, you can also send messages to your care team and make appointments. If you have questions, please call your primary care clinic.  If you do not have a primary care provider, please call [phone] and they will assist you.      Tepha is an electronic gateway that provides easy, online access to your medical records. With Tepha, you can request a clinic appointment, read your test results, renew a prescription or communicate with your care team.     To access your existing account, please contact your [facility] or call [phone] for assistance.        Care EveryWhere ID     This is your Care EveryWhere ID. This could be used by other organizations to access your [facility] medical records  [id number]         Blood Pressure from Last 3 Encounters:   08/14/18 140/59   07/31/18 151/71   07/26/18 150/66    Weight from Last 3 Encounters:   06/29/18 76.7 kg (169 lb 1.6 oz)   06/19/18 76.7 kg (169 lb)   06/08/18 76.7 kg (169 lb)              Today, you had the following     No orders found for display         Today's Medication Changes          These changes are accurate as of 8/15/18  1:12 PM.  If you have any questions, ask your nurse or doctor.               These medicines have changed or have updated prescriptions.        Dose/Directions    * clopidogrel 75 MG tablet   Commonly known as:  PLAVIX   This may have changed:  when to take this   Used for:  Peripheral vascular disease, unspecified        Dose:  75 mg   Take 1 tablet (75 mg) by mouth daily   Quantity:  30 tablet   Refills:  11       * clopidogrel 75 MG tablet   Commonly known as:  PLAVIX   This may  have changed:  Another medication with the same name was changed. Make sure you understand how and when to take each.   Used for:  Peripheral vascular disease (H)        Dose:  75 mg   Take 1 tablet (75 mg) by mouth daily   Quantity:  30 tablet   Refills:  3       gentamicin 0.1 % cream   Commonly known as:  GARAMYCIN   This may have changed:    - when to take this  - reasons to take this  - additional instructions   Used for:  Ulcer of right lower leg, with fat layer exposed (H), Chronic venous hypertension with ulcer involving right side (H), Type 2 diabetes, controlled, with neuropathy (H)        Apply topically daily To right leg ulcer.   Quantity:  30 g   Refills:  5       * Notice:  This list has 2 medication(s) that are the same as other medications prescribed for you. Read the directions carefully, and ask your doctor or other care provider to review them with you.             Primary Care Provider Office Phone # Fax #    Racheal Swift [phone] [address] [address]        Equal Access to Services     ROSA ELENA MELTON : Hadii niurka webb hadasho Soomaali, waaxda luqadaha, qaybta kaalmada adeegyada, yolanda duran . So [facility] [phone].    ATENCIÓN: Si habla español, tiene a rodrigues disposición servicios gratuitos de asistencia lingüística. LlThe [facility] [phone].    We comply with applicable federal civil rights laws and [place] laws. We do not discriminate on the basis of race, color, national origin, age, disability, sex, sexual orientation, or gender identity.            Thank you!     Thank you for choosing [facility]  for your care. Our goal is always to provide you with excellent care. Hearing back from our patients is one way we can continue to improve our services. Please take a few minutes to complete the written survey that you may receive in the mail after your visit with us. Thank you!             Your Updated  Medication List - Protect others around you: Learn how to safely use, store and throw away your medicines at www.disposemymeds.org.          This list is accurate as of 8/15/18  1:12 PM.  Always use your most recent med list.                   Brand Name Dispense Instructions for use Diagnosis    ammonium lactate 12 % cream    LAC-HYDRIN    385 g    Apply topically 2 times daily as needed for dry skin    Venous stasis, Type 2 diabetes, controlled, with neuropathy (H)       ascorbic acid 500 MG Tabs     30 tablet    Take 1 tablet (500 mg) by mouth 2 times daily    Ulcer of right lower leg, with fat layer exposed (H)       ASPIRIN PO      Take 81 mg by mouth daily        blood glucose monitoring lancets     3 Box    Use to test blood sugars 2 as directed.    Type 2 diabetes, uncontrolled, with neuropathy (H)       Blood Pressure Kit     1 kit    1 Device daily    Benign essential hypertension       cefadroxil 500 MG capsule    DURICEF    20 capsule    Take 1 capsule (500 mg) by mouth 2 times daily    Skin ulcer of left foot, limited to breakdown of skin (H), Type II or unspecified type diabetes mellitus with neurological manifestations, not stated as uncontrolled(250.60) (H), Diabetes mellitus with peripheral vascular disease (H)       * clopidogrel 75 MG tablet    PLAVIX    30 tablet    Take 1 tablet (75 mg) by mouth daily    Peripheral vascular disease, unspecified       * clopidogrel 75 MG tablet    PLAVIX    30 tablet    Take 1 tablet (75 mg) by mouth daily    Peripheral vascular disease (H)       ferrous sulfate 325 (65 Fe) MG tablet    IRON    60 tablet    Take 1 tablet (325 mg) by mouth 2 times daily    Peripheral vascular disease, unspecified       gentamicin 0.1 % cream    GARAMYCIN    30 g    Apply topically daily To right leg ulcer.    Ulcer of right lower leg, with fat layer exposed (H), Chronic venous hypertension with ulcer involving right side (H), Type 2 diabetes, controlled, with neuropathy (H)        "insulin glargine 100 UNIT/ML injection    LANTUS SOLOSTAR    3 mL    Inject 16 Units Subcutaneous daily (with dinner) May take up to 22 units daily    Type 2 diabetes mellitus with diabetic peripheral angiopathy without gangrene, with long-term current use of insulin (H)       insulin pen needle 31G X 8 MM    B-D U/F    100 each    Use 1 daily as directed    Diabetes mellitus, type II (H)       lisinopril 10 MG tablet    PRINIVIL/ZESTRIL    90 tablet    Take 1 tablet (10 mg) by mouth daily    Benign essential hypertension       * nateglinide 120 MG tablet    STARLIX    90 tablet    TAKE 1 TABLET BY MOUTH THREE TIMES DAILY BEFORE MEALS    Type 2 diabetes, controlled, with neuropathy (H)       * nateglinide 120 MG tablet    STARLIX    90 tablet    Take 1 tablet (120 mg) by mouth 3 times daily (before meals)    Diabetes mellitus (H)       ONETOUCH ULTRA test strip   Generic drug:  blood glucose monitoring     200 strip    USE TO TEST TWICE DAILY OR AS DIRECTED    Type 2 diabetes mellitus (H)       OPTIFOAM 6\"X6\" Pads     1 each    1 Box once a week    Ulcer of right leg, with fat layer exposed (H)       order for DME     2 each    Please measure and distribute 1 pair of 20mm Hg - 30mm Hg knee high ULCER CARE open or closed toe compression stockings.  Patient has a size 13 foot and please take this into consideration.  Jobst or equivalent    Varicose veins of lower extremities with other complications, Venous stasis ulcer of right lower extremity (H)       order for DME     3 each    Please measure and distribute 1 pair of 20mmHg - 30mmHg knee high open or closed toe compression stockings. Jobst ultrasheer or equivalent.    Varicose veins of both lower extremities with complications       order for DME     2 each    Please measure and distribute 1 pair of 30mmHg - 40mmHg knee high open toe ulcercare compression stockings. Jobst ultrasheer or equivalent.    Varicose veins of bilateral lower extremities with other " complications       sildenafil 50 MG tablet    VIAGRA    10 tablet    Take 1 tablet (50 mg) by mouth daily as needed for erectile dysfunction    Vasculogenic erectile dysfunction, unspecified vasculogenic erectile dysfunction type       silver sulfADIAZINE 1 % cream    SILVADENE    85 g    Apply topically daily To affected areas on right foot and leg.    Ulcer of right lower leg, with fat layer exposed (H), Chronic venous hypertension with ulcer involving right side (H), Type 2 diabetes, controlled, with neuropathy (H)       simvastatin 10 MG tablet    ZOCOR    90 tablet    Take 1 tablet (10 mg) by mouth At Bedtime    Type 2 diabetes, controlled, with neuropathy (H)       triamcinolone 0.1 % cream    KENALOG    60 g    Apply sparingly to left heel daily.    Dermatitis of left foot       VITAMIN C PO      Take 1,000 mg by mouth        VITAMIN D (CHOLECALCIFEROL) PO      Take 1,000 Units by mouth 2 times daily        * Notice:  This list has 4 medication(s) that are the same as other medications prescribed for you. Read the directions carefully, and ask your doctor or other care provider to review them with you.

## 2018-08-16 ENCOUNTER — OFFICE VISIT (OUTPATIENT)
Dept: AUDIOLOGY | Facility: CLINIC | Age: 73
End: 2018-08-16
Payer: MEDICARE

## 2018-08-16 DIAGNOSIS — H90.6 MIXED HEARING LOSS, BILATERAL: Primary | ICD-10-CM

## 2018-08-16 DIAGNOSIS — H93.13 TINNITUS OF BOTH EARS: ICD-10-CM

## 2018-08-16 NOTE — PROGRESS NOTES
AUDIOLOGY REPORT    SUBJECTIVE:  Amos Walker is a 73 year old male who was seen in the Audiology Clinic at the [facility] for audiologic evaluation, referred by Dr. Racheal Swift.  The patient has been seen previously in this clinic on 1/23/15 for assessment and results indicated a bilateral mixed hearing loss.  He currently utilizes binaural Phonak Olga S CRT V hearing aids that he obtained from an outside clinic. The patient reports a gradual decline in his left ear hearing status.  He reports bilateral tinnitus which occurs when he is not wearing his hearing aids.  He also reports significant itchiness in his ears. The patient denies bilateral otalgia, bilateral drainage, bilateral aural fullness, and dizziness.  The patient notes difficulty with communication in a variety of listening situations, mostly in background noise.  He feels that the volume of both hearing aids needs to be increased.      OBJECTIVE:  Otoscopic exam indicates abnormal eardrums bilaterally, patient reports he has already followed up with PCP regarding this in the past.    Pure Tone Thresholds assessed using conventional audiometry with good  reliability from 250-8000 Hz bilaterally using insert earphones and circumaural headphones     RIGHT:  severe mixed hearing loss    LEFT:    Moderately-severe to severe mixed hearing loss    Tympanogram:    RIGHT: normal eardrum mobility    LEFT:   normal eardrum mobility    Reflexes (reported by stimulus ear):  RIGHT: Ipsilateral is absent at frequencies tested  RIGHT: Contralateral is absent at frequencies tested  LEFT:   Ipsilateral is absent at frequencies tested  LEFT:   Contralateral is absent at frequencies tested    Speech Reception Threshold:    RIGHT: 90 dB HL (higher than expected given thresholds)    LEFT:   75 dB HL    Word Recognition Score:     RIGHT: 64% at 100 dB HL using NU-6 recorded word list.    LEFT:   52% at 100 dB HL using NU-6  recorded word list.      Both hearing aids were cleaned. A listening check revealed that they sounded crisp and clear with no distortion or weakness noted.  An electroacoustic analysis revealed that the hearing aids were functioning appropriately. Both devices were connected to the software and real-ear measurements were made on the eCaring system.  Measurements revealed that the hearing aids were slightly under NAL-NL1 target gain for the updated hearing evaluation.  Gain was increased around 2 kHz and 500-1000 Hz in the right ear 3 dB and gain was increased 500-2000 Hz in the left ear 4-7 dB.  Patient reported better volume and stated that they were balanced between the ears.  Sound recover was also strengthened to start closed to 3 kHz and patient reported better speech clarity.    He requested that changes be made to his noise program and telephone program as well.  The mid-range frequencies were increased 4 dB in both programs.  A discussion was had regarding assistive technology that may allow him to hear better in background noise, patient was not interested in remote microphone at this time.     He had questions regarding new technology. It was reviewed that he may see an increased benefit in background noise situations.  He expressed understanding but did not wish to pursue new devices at this time given that his current set can be programmed appropriately for his hearing loss. Realistic expectations regarding communication given his word recognition scores were discussed.    It was discussed that he should follow-up with his PCP or an ENT regarding the ear itchiness and abnormal otoscopy.  He expressed understanding.    ASSESSMENT:   Compared to patient's previous audiogram dated 1/23/2015, hearing has remained stable bilaterally. Hearing aid check completed. Today s results were discussed with the patient in detail.     PLAN:  Patient was counseled regarding hearing loss and impact on communication.   Patient continues to be a candidate for amplification at this time. It is recommended that the patient follow-up with ENT or his PCP regarding the ear itchiness and abnormal otoscopy. It is recommended that he continue to monitor hearing status every 1-2 years, sooner if concerns arise.  Lastly, it is recommended that he return for a hearing aid check every 9-12 months, sooner if concerns arise. Patient is welcome to return to discuss new technology or obtain new earmolds if he desires. Please call this clinic with questions regarding these results or recommendations.        Eddie Jane  Audiologist  [place] License  #[id number]

## 2018-08-16 NOTE — MR AVS SNAPSHOT
After Visit Summary   8/16/2018    Amos Walker    MRN: [id number]           Patient Information     Date Of Birth          1945        Visit Information        Provider Department      8/16/2018 8:00 AM Leslie Romeo AuD [facility] Audiology        Today's Diagnoses     Mixed hearing loss, bilateral    -  1    Tinnitus of both ears           Follow-ups after your visit        Your next 10 appointments already scheduled     Aug 21, 2018  2:30 PM CDT   [facility]    [address] [place] [address]   [phone]            Aug 28, 2018  2:30 PM CDT   Return Visit with Brayan Mcclain DPM   [facility]    [address] [place] [address]   [phone]            Sep 04, 2018  9:30 AM CDT   Return Visit with Brayan Mcclain DPM   [facility]    [address] [place] [address]   [phone]            Sep 06, 2018 11:30 AM CDT   US UPPER EXTREMITY VENOUS MAPPING BILATERAL with UCUSV2   [facility] ([facility])    [address] [place] [address] [phone]           Please bring a list of your medicines (including vitamins, minerals and over-the-counter drugs). Also, tell your doctor about any allergies you may have. Wear comfortable clothes and leave your valuables at home.  You do not need to do anything special to prepare for your exam.  Please call the Imaging Department at your exam site with any questions.            Sep 06, 2018  1:00 PM CDT   (Arrive by 12:45 PM)   New Vascular Visit with Augusto Maharaj MD   [facility] ([facility])    [address] [address] [phone]            Sep 11, 2018  9:30  AM CDT   Return Visit with Brayan Mcclain DPM   [facility]    [address] [facility] [address] [phone]            Sep 18, 2018  9:30 AM CDT   Return Visit with Brayan Mcclain DPM   [facility])    [address] [facility] [address]   [phone]            Sep 19, 2018   Procedure with Rickie Gautam MD   [facility] [place] Endoscopy ([facility])    [address] [facility] [address] [phone]           The [facility] is located on the corner of [facility] and [facility] on the [facility]. It is easily accessible from virtually any point in the [facility] area, via [address] and [address].            Nov 05, 2018  1:40 PM CST   (Arrive by 1:25 PM)   Return Visit with Racheal Swift MD   [facility] ([facility])    [address] [facility] [address]  [address] [facility] [id number] [phone]            Jun 20, 2019 10:30 AM CDT   RETURN RETINA with Jen Aranda MD   [facility] Clinic ([facility])    [address]  [address] [address]  [facility] [address] [phone]              Who to contact     Please call your clinic at [phone] to:    Ask questions about your health    Make or cancel appointments    Discuss your medicines    Learn about your test results    Speak to your doctor            Additional Information About Your Visit        MyChart Information     [employer] gives you secure access to your electronic health record. If you see a primary care provider, you can also send messages to your care team and make appointments. If you have questions, please call your primary care clinic.  If you do not have a primary care provider, please call [phone] and they will assist you.       [employer] is an electronic gateway that provides easy, online access to your medical records. With [employer], you can request a clinic appointment, read your test results, renew a prescription or communicate with your care team.     To access your existing account, please contact your [facility] or call [phone] for assistance.        Care EveryWhere ID     This is your Care EveryWhere ID. This could be used by other organizations to access your [place] medical records  [id number]         Blood Pressure from Last 3 Encounters:   08/14/18 140/59   07/31/18 151/71   07/26/18 150/66    Weight from Last 3 Encounters:   06/29/18 76.7 kg (169 lb 1.6 oz)   06/19/18 76.7 kg (169 lb)   06/08/18 76.7 kg (169 lb)              We Performed the Following     AUDIOGRAM/TYMPANOGRAM - INTERFACE     [facility] Audiometry Thrshld Eval & Speech Recog ([address])     Electro Acoustic Hearing Aid Test, Binaural (96627)     Tymps / Reflex   (35073)          Today's Medication Changes          These changes are accurate as of 8/16/18  9:55 AM.  If you have any questions, ask your nurse or doctor.               These medicines have changed or have updated prescriptions.        Dose/Directions    * clopidogrel 75 MG tablet   Commonly known as:  PLAVIX   This may have changed:  when to take this   Used for:  Peripheral vascular disease, unspecified        Dose:  75 mg   Take 1 tablet (75 mg) by mouth daily   Quantity:  30 tablet   Refills:  11       * clopidogrel 75 MG tablet   Commonly known as:  PLAVIX   This may have changed:  Another medication with the same name was changed. Make sure you understand how and when to take each.   Used for:  Peripheral vascular disease (H)        Dose:  75 mg   Take 1 tablet (75 mg) by mouth daily   Quantity:  30 tablet   Refills:  3       gentamicin 0.1 % cream   Commonly known as:  GARAMYCIN   This may have changed:    - when to take this  - reasons to take this  - additional  instructions   Used for:  Ulcer of right lower leg, with fat layer exposed (H), Chronic venous hypertension with ulcer involving right side (H), Type 2 diabetes, controlled, with neuropathy (H)        Apply topically daily To right leg ulcer.   Quantity:  30 g   Refills:  5       * Notice:  This list has 2 medication(s) that are the same as other medications prescribed for you. Read the directions carefully, and ask your doctor or other care provider to review them with you.             Primary Care Provider Office Phone # Fax #    Racheal Swift [phone] [address] [address]        Equal Access to Services     [facility] : Hadii niurka Bedoya, miguel abebe, hollie jeffery, yolanda duran . [facility] [phone].    ATENCIÓN: Si habla español, tiene a rodrigues disposición servicios gratuitos de asistencia lingüística. [facility] [phone].    We comply with applicable federal civil rights laws and [place] laws. We do not discriminate on the basis of race, color, national origin, age, disability, sex, sexual orientation, or gender identity.            Thank you!     Thank you for choosing [facility] AUDIOLOGY  for your care. Our goal is always to provide you with excellent care. Hearing back from our patients is one way we can continue to improve our services. Please take a few minutes to complete the written survey that you may receive in the mail after your visit with us. Thank you!             Your Updated Medication List - Protect others around you: Learn how to safely use, store and throw away your medicines at www.disposemymeds.org.          This list is accurate as of 8/16/18  9:55 AM.  Always use your most recent med list.                   Brand Name Dispense Instructions for use Diagnosis    ammonium lactate 12 % cream    LAC-HYDRIN    385 g    Apply topically 2 times daily as needed for dry skin    Venous  stasis, Type 2 diabetes, controlled, with neuropathy (H)       ascorbic acid 500 MG Tabs     30 tablet    Take 1 tablet (500 mg) by mouth 2 times daily    Ulcer of right lower leg, with fat layer exposed (H)       ASPIRIN PO      Take 81 mg by mouth daily        blood glucose monitoring lancets     3 Box    Use to test blood sugars 2 as directed.    Type 2 diabetes, uncontrolled, with neuropathy (H)       Blood Pressure Kit     1 kit    1 Device daily    Benign essential hypertension       cefadroxil 500 MG capsule    DURICEF    20 capsule    Take 1 capsule (500 mg) by mouth 2 times daily    Skin ulcer of left foot, limited to breakdown of skin (H), Type II or unspecified type diabetes mellitus with neurological manifestations, not stated as uncontrolled(250.60) (H), Diabetes mellitus with peripheral vascular disease (H)       * clopidogrel 75 MG tablet    PLAVIX    30 tablet    Take 1 tablet (75 mg) by mouth daily    Peripheral vascular disease, unspecified       * clopidogrel 75 MG tablet    PLAVIX    30 tablet    Take 1 tablet (75 mg) by mouth daily    Peripheral vascular disease (H)       ferrous sulfate 325 (65 Fe) MG tablet    IRON    60 tablet    Take 1 tablet (325 mg) by mouth 2 times daily    Peripheral vascular disease, unspecified       gentamicin 0.1 % cream    GARAMYCIN    30 g    Apply topically daily To right leg ulcer.    Ulcer of right lower leg, with fat layer exposed (H), Chronic venous hypertension with ulcer involving right side (H), Type 2 diabetes, controlled, with neuropathy (H)       insulin glargine 100 UNIT/ML injection    LANTUS SOLOSTAR    3 mL    Inject 16 Units Subcutaneous daily (with dinner) May take up to 22 units daily    Type 2 diabetes mellitus with diabetic peripheral angiopathy without gangrene, with long-term current use of insulin (H)       insulin pen needle 31G X 8 MM    B-D U/F    100 each    Use 1 daily as directed    Diabetes mellitus, type II (H)       lisinopril 10 MG  "tablet    PRINIVIL/ZESTRIL    90 tablet    Take 1 tablet (10 mg) by mouth daily    Benign essential hypertension       * nateglinide 120 MG tablet    STARLIX    90 tablet    TAKE 1 TABLET BY MOUTH THREE TIMES DAILY BEFORE MEALS    Type 2 diabetes, controlled, with neuropathy (H)       * nateglinide 120 MG tablet    STARLIX    90 tablet    Take 1 tablet (120 mg) by mouth 3 times daily (before meals)    Diabetes mellitus (H)       ONETOUCH ULTRA test strip   Generic drug:  blood glucose monitoring     200 strip    USE TO TEST TWICE DAILY OR AS DIRECTED    Type 2 diabetes mellitus (H)       OPTIFOAM 6\"X6\" Pads     1 each    1 Box once a week    Ulcer of right leg, with fat layer exposed (H)       order for DME     2 each    Please measure and distribute 1 pair of 20mm Hg - 30mm Hg knee high ULCER CARE open or closed toe compression stockings.  Patient has a size 13 foot and please take this into consideration.  Jobst or equivalent    Varicose veins of lower extremities with other complications, Venous stasis ulcer of right lower extremity (H)       order for DME     3 each    Please measure and distribute 1 pair of 20mmHg - 30mmHg knee high open or closed toe compression stockings. Jobst ultrasheer or equivalent.    Varicose veins of both lower extremities with complications       order for DME     2 each    Please measure and distribute 1 pair of 30mmHg - 40mmHg knee high open toe ulcercare compression stockings. Jobst ultrasheer or equivalent.    Varicose veins of bilateral lower extremities with other complications       sildenafil 50 MG tablet    VIAGRA    10 tablet    Take 1 tablet (50 mg) by mouth daily as needed for erectile dysfunction    Vasculogenic erectile dysfunction, unspecified vasculogenic erectile dysfunction type       silver sulfADIAZINE 1 % cream    SILVADENE    85 g    Apply topically daily To affected areas on right foot and leg.    Ulcer of right lower leg, with fat layer exposed (H), Chronic " venous hypertension with ulcer involving right side (H), Type 2 diabetes, controlled, with neuropathy (H)       simvastatin 10 MG tablet    ZOCOR    90 tablet    Take 1 tablet (10 mg) by mouth At Bedtime    Type 2 diabetes, controlled, with neuropathy (H)       triamcinolone 0.1 % cream    KENALOG    60 g    Apply sparingly to left heel daily.    Dermatitis of left foot       VITAMIN C PO      Take 1,000 mg by mouth        VITAMIN D (CHOLECALCIFEROL) PO      Take 1,000 Units by mouth 2 times daily        * Notice:  This list has 4 medication(s) that are the same as other medications prescribed for you. Read the directions carefully, and ask your doctor or other care provider to review them with you.

## 2018-08-20 ENCOUNTER — MYC REFILL (OUTPATIENT)
Dept: INTERNAL MEDICINE | Facility: CLINIC | Age: 73
End: 2018-08-20

## 2018-08-20 DIAGNOSIS — I73.9 PERIPHERAL VASCULAR DISEASE (H): ICD-10-CM

## 2018-08-20 RX ORDER — CLOPIDOGREL BISULFATE 75 MG/1
75 TABLET ORAL DAILY
Qty: 30 TABLET | Refills: 3 | Status: CANCELLED | OUTPATIENT
Start: 2018-08-20

## 2018-08-21 ENCOUNTER — OFFICE VISIT (OUTPATIENT)
Dept: PODIATRY | Facility: CLINIC | Age: 73
End: 2018-08-21
Payer: MEDICARE

## 2018-08-21 VITALS — HEART RATE: 119 BPM | DIASTOLIC BLOOD PRESSURE: 66 MMHG | SYSTOLIC BLOOD PRESSURE: 120 MMHG | OXYGEN SATURATION: 99 %

## 2018-08-21 DIAGNOSIS — L97.512 SKIN ULCER OF RIGHT FOOT WITH FAT LAYER EXPOSED (H): Primary | ICD-10-CM

## 2018-08-21 DIAGNOSIS — E11.51 DIABETES MELLITUS WITH PERIPHERAL VASCULAR DISEASE (H): ICD-10-CM

## 2018-08-21 DIAGNOSIS — E11.49 TYPE II OR UNSPECIFIED TYPE DIABETES MELLITUS WITH NEUROLOGICAL MANIFESTATIONS, NOT STATED AS UNCONTROLLED(250.60) (H): ICD-10-CM

## 2018-08-21 DIAGNOSIS — L97.912 ULCER OF RIGHT LOWER EXTREMITY WITH FAT LAYER EXPOSED (H): ICD-10-CM

## 2018-08-21 PROCEDURE — 99213 OFFICE O/P EST LOW 20 MIN: CPT | Performed by: PODIATRIST
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
## 2018-08-21 NOTE — MR AVS SNAPSHOT
After Visit Summary   8/21/2018    Amos Walker    MRN: [id number]           Patient Information     Date Of Birth          1945        Visit Information        Provider Department      8/21/2018 2:30 PM Brayan Mcclain DPM [facility]        Today's Diagnoses     Skin ulcer of right foot with fat layer exposed (H)    -  1    Ulcer of right lower extremity with fat layer exposed (H)        Type II or unspecified type diabetes mellitus with neurological manifestations, not stated as uncontrolled(250.60) (H)        Diabetes mellitus with peripheral vascular disease (H)           Follow-ups after your visit        Your next 10 appointments already scheduled     Aug 28, 2018  2:30 PM CDT   Return Visit with Brayan Mcclain DPM   [facility] ([facility])    [address] [place] [address] [phone]            Sep 04, 2018  9:30 AM CDT   Return Visit with Brayan Mcclain DPM   [facility] ([facility])    [address] [place] [address] [phone]            Sep 06, 2018 11:30 AM CDT   US UPPER EXTREMITY VENOUS MAPPING BILATERAL with UCUSV2   [facility] ([facility])    [address] [place] [address] [phone]           Please bring a list of your medicines (including vitamins, minerals and over-the-counter drugs). Also, tell your doctor about any allergies you may have. Wear comfortable clothes and leave your valuables at home.  You do not need to do anything special to prepare for your exam.  Please call the Imaging Department at your exam site with any questions.            Sep 06, 2018  1:00 PM CDT   (Arrive by 12:45 PM)   New Vascular Visit with Augusto Maharaj MD   [facility] ([facility])    [address]  [facility] [address]    [phone]            Sep 11, 2018  9:30 AM CDT   Return Visit with Brayan Mcclain DPM   [facility] ([facility]    [address] [facility] [address]   [phone]            Sep 18, 2018  9:30 AM CDT   Return Visit with Brayan Mcclain DPM   [facility] ([facility])    [address] [place] [address]   [phone]            Sep 19, 2018   Procedure with Rickie Gautam MD   [facility] [place] End[facility] ([facility])    [address]  [place] [address]   [phone]           The [facility] is located on the corner of [facility] and [facility] on the [facility]. It is easily accessible from virtually any point in the [facility] area, via [address] and [address].            Sep 25, 2018  2:30 PM CDT   Return Visit with Brayan Mcclain DPM   [facility] ([facility])    [address] [place] [address]   [phone]            Oct 02, 2018  3:00 PM CDT   Return Visit with Brayan Mcclain DPM   [facility] ([facility])    [id number] [facility] [address] [phone]            Oct 09, 2018  3:00 PM CDT   Return Visit with Brayan Mcclain DPM   [facility] ([facility])    [address] [place] [address] [phone]              Who to contact     If you have questions or need follow up information about today's clinic visit or your schedule please contact [facility] directly at [phone].  Normal or non-critical lab and imaging results will be communicated to you by MyChart, letter or phone within 4 business days after the clinic has received the results. If you do not hear from us within 7 days, please contact the clinic through MyChart or phone. If  you have a critical or abnormal lab result, we will notify you by phone as soon as possible.  Submit refill requests through [employer] or call your pharmacy and they will forward the refill request to us. Please allow 3 business days for your refill to be completed.          Additional Information About Your Visit        Jibbigohart Information     [employer]Star gives you secure access to your electronic health record. If you see a primary care provider, you can also send messages to your care team and make appointments. If you have questions, please call your primary care clinic.  If you do not have a primary care provider, please call [phone] and they will assist you.      [employer]Star is an electronic gateway that provides easy, online access to your medical records. With [employer], you can request a clinic appointment, read your test results, renew a prescription or communicate with your care team.     To access your existing account, please contact your [facility] or call [phone] for assistance.        Care EveryWhere ID     This is your Care EveryWhere ID. This could be used by other organizations to access your [place] medical records  [id number]        Your Vitals Were     Pulse Pulse Oximetry                119 99%           Blood Pressure from Last 3 Encounters:   08/21/18 120/66   08/14/18 140/59   07/31/18 151/71    Weight from Last 3 Encounters:   06/29/18 76.7 kg (169 lb 1.6 oz)   06/19/18 76.7 kg (169 lb)   06/08/18 76.7 kg (169 lb)              Today, you had the following     No orders found for display         Today's Medication Changes          These changes are accurate as of 8/21/18  2:58 PM.  If you have any questions, ask your nurse or doctor.               These medicines have changed or have updated prescriptions.        Dose/Directions    * clopidogrel 75 MG tablet   Commonly known as:  PLAVIX   This may have changed:  when to take this   Used for:  Peripheral  vascular disease, unspecified        Dose:  75 mg   Take 1 tablet (75 mg) by mouth daily   Quantity:  30 tablet   Refills:  11       * clopidogrel 75 MG tablet   Commonly known as:  PLAVIX   This may have changed:  Another medication with the same name was changed. Make sure you understand how and when to take each.   Used for:  Peripheral vascular disease (H)        Dose:  75 mg   Take 1 tablet (75 mg) by mouth daily   Quantity:  30 tablet   Refills:  3       gentamicin 0.1 % cream   Commonly known as:  GARAMYCIN   This may have changed:    - when to take this  - reasons to take this  - additional instructions   Used for:  Ulcer of right lower leg, with fat layer exposed (H), Chronic venous hypertension with ulcer involving right side (H), Type 2 diabetes, controlled, with neuropathy (H)        Apply topically daily To right leg ulcer.   Quantity:  30 g   Refills:  5       * Notice:  This list has 2 medication(s) that are the same as other medications prescribed for you. Read the directions carefully, and ask your doctor or other care provider to review them with you.             Primary Care Provider Office Phone # Fax #    Racheal Swift [phone] [address] [address]        Equal Access to Services     [facility] : Hadii niurka Bedoya, wajunitoda luroxanna, qaybta kaalmada jose d, yolanda lopez. So [facility] [phone].    ATENCIÓN: Si habla español, tiene a rodrigues disposición servicios gratuitos de asistencia lingüística. Llame al [phone].    We comply with applicable federal civil rights laws and [place] laws. We do not discriminate on the basis of race, color, national origin, age, disability, sex, sexual orientation, or gender identity.            Thank you!     Thank you for choosing [facility]  for your care. Our goal is always to provide you with excellent care. Hearing back from our patients is  one way we can continue to improve our services. Please take a few minutes to complete the written survey that you may receive in the mail after your visit with us. Thank you!             Your Updated Medication List - Protect others around you: Learn how to safely use, store and throw away your medicines at www.disposemymeds.org.          This list is accurate as of 8/21/18  2:58 PM.  Always use your most recent med list.                   Brand Name Dispense Instructions for use Diagnosis    ammonium lactate 12 % cream    LAC-HYDRIN    385 g    Apply topically 2 times daily as needed for dry skin    Venous stasis, Type 2 diabetes, controlled, with neuropathy (H)       ascorbic acid 500 MG Tabs     30 tablet    Take 1 tablet (500 mg) by mouth 2 times daily    Ulcer of right lower leg, with fat layer exposed (H)       ASPIRIN PO      Take 81 mg by mouth daily        blood glucose monitoring lancets     3 Box    Use to test blood sugars 2 as directed.    Type 2 diabetes, uncontrolled, with neuropathy (H)       Blood Pressure Kit     1 kit    1 Device daily    Benign essential hypertension       cefadroxil 500 MG capsule    DURICEF    20 capsule    Take 1 capsule (500 mg) by mouth 2 times daily    Skin ulcer of left foot, limited to breakdown of skin (H), Type II or unspecified type diabetes mellitus with neurological manifestations, not stated as uncontrolled(250.60) (H), Diabetes mellitus with peripheral vascular disease (H)       * clopidogrel 75 MG tablet    PLAVIX    30 tablet    Take 1 tablet (75 mg) by mouth daily    Peripheral vascular disease, unspecified       * clopidogrel 75 MG tablet    PLAVIX    30 tablet    Take 1 tablet (75 mg) by mouth daily    Peripheral vascular disease (H)       ferrous sulfate 325 (65 Fe) MG tablet    IRON    60 tablet    Take 1 tablet (325 mg) by mouth 2 times daily    Peripheral vascular disease, unspecified       gentamicin 0.1 % cream    GARAMYCIN    30 g    Apply topically  "daily To right leg ulcer.    Ulcer of right lower leg, with fat layer exposed (H), Chronic venous hypertension with ulcer involving right side (H), Type 2 diabetes, controlled, with neuropathy (H)       insulin glargine 100 UNIT/ML injection    LANTUS SOLOSTAR    3 mL    Inject 16 Units Subcutaneous daily (with dinner) May take up to 22 units daily    Type 2 diabetes mellitus with diabetic peripheral angiopathy without gangrene, with long-term current use of insulin (H)       insulin pen needle 31G X 8 MM    B-D U/F    100 each    Use 1 daily as directed    Diabetes mellitus, type II (H)       lisinopril 10 MG tablet    PRINIVIL/ZESTRIL    90 tablet    Take 1 tablet (10 mg) by mouth daily    Benign essential hypertension       * nateglinide 120 MG tablet    STARLIX    90 tablet    TAKE 1 TABLET BY MOUTH THREE TIMES DAILY BEFORE MEALS    Type 2 diabetes, controlled, with neuropathy (H)       * nateglinide 120 MG tablet    STARLIX    90 tablet    Take 1 tablet (120 mg) by mouth 3 times daily (before meals)    Diabetes mellitus (H)       ONETOUCH ULTRA test strip   Generic drug:  blood glucose monitoring     200 strip    USE TO TEST TWICE DAILY OR AS DIRECTED    Type 2 diabetes mellitus (H)       OPTIFOAM 6\"X6\" Pads     1 each    1 Box once a week    Ulcer of right leg, with fat layer exposed (H)       order for DME     2 each    Please measure and distribute 1 pair of 20mm Hg - 30mm Hg knee high ULCER CARE open or closed toe compression stockings.  Patient has a size 13 foot and please take this into consideration.  Jobst or equivalent    Varicose veins of lower extremities with other complications, Venous stasis ulcer of right lower extremity (H)       order for DME     3 each    Please measure and distribute 1 pair of 20mmHg - 30mmHg knee high open or closed toe compression stockings. Jobst ultrasheer or equivalent.    Varicose veins of both lower extremities with complications       order for DME     2 each    " Please measure and distribute 1 pair of 30mmHg - 40mmHg knee high open toe ulcercare compression stockings. Jobst ultrasheer or equivalent.    Varicose veins of bilateral lower extremities with other complications       sildenafil 50 MG tablet    VIAGRA    10 tablet    Take 1 tablet (50 mg) by mouth daily as needed for erectile dysfunction    Vasculogenic erectile dysfunction, unspecified vasculogenic erectile dysfunction type       silver sulfADIAZINE 1 % cream    SILVADENE    85 g    Apply topically daily To affected areas on right foot and leg.    Ulcer of right lower leg, with fat layer exposed (H), Chronic venous hypertension with ulcer involving right side (H), Type 2 diabetes, controlled, with neuropathy (H)       simvastatin 10 MG tablet    ZOCOR    90 tablet    Take 1 tablet (10 mg) by mouth At Bedtime    Type 2 diabetes, controlled, with neuropathy (H)       triamcinolone 0.1 % cream    KENALOG    60 g    Apply sparingly to left heel daily.    Dermatitis of left foot       VITAMIN C PO      Take 1,000 mg by mouth        VITAMIN D (CHOLECALCIFEROL) PO      Take 1,000 Units by mouth 2 times daily        * Notice:  This list has 4 medication(s) that are the same as other medications prescribed for you. Read the directions carefully, and ask your doctor or other care provider to review them with you.
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
## 2018-08-21 NOTE — NURSING NOTE
Amos Walker's chief complaint for this visit includes:  Chief Complaint   Patient presents with     RECHECK     PCP: Racheal Swift    Referring Provider:  No referring provider defined for this encounter.    /66  Pulse 119  SpO2 99%  Data Unavailable     Do you need any medication refills at today's visit? no

## 2018-08-21 NOTE — TELEPHONE ENCOUNTER
clopidogrel (PLAVIX) 75 MG tablet    Last Written Prescription Date: 4/30/18  Last Fill Quantity: 30,   # refills: 3  Last Office Visit : 6/29/18  Future Office visit:  11/5/18    Case because:  hgb

## 2018-08-22 RX ORDER — CLOPIDOGREL BISULFATE 75 MG/1
75 TABLET ORAL DAILY
Qty: 90 TABLET | Refills: 0 | Status: SHIPPED | OUTPATIENT
Start: 2018-08-22 | End: 2018-11-28

## 2018-08-22 NOTE — TELEPHONE ENCOUNTER
Plavix      Last Written Prescription Date:  4-  Last Fill Quantity: 30,   # refills: 3  Last Office Visit : 6-29-18  Future Office visit:  11-5-18    Routing refill request to provider for review/approval because:  Low HGB      Kathleen M Doege RN

## 2018-08-22 NOTE — TELEPHONE ENCOUNTER
Message from Gabriela:  Kayla Knott CMA Mon Aug 20, 2018 2:10 PM        ----- Message -----   From: Amos Walker   Sent: 8/20/2018 2:03 PM   To: [facility] Nursing Staff-  Subject: Medication Renewal Request     Original authorizing provider: MD Amos Jimenez would like a refill of the following medications:  clopidogrel (PLAVIX) 75 MG tablet [Racheal Swift MD]    Preferred pharmacy: [facility] [address] [place] [address], [place] - [address] AT [facility] [address] [facility]    Comment:

## 2018-08-28 ENCOUNTER — OFFICE VISIT (OUTPATIENT)
Dept: PODIATRY | Facility: CLINIC | Age: 73
End: 2018-08-28
Payer: MEDICARE

## 2018-08-28 VITALS
SYSTOLIC BLOOD PRESSURE: 129 MMHG | DIASTOLIC BLOOD PRESSURE: 69 MMHG | OXYGEN SATURATION: 99 % | HEART RATE: 87 BPM | TEMPERATURE: 97.8 F

## 2018-08-28 DIAGNOSIS — E11.51 DIABETES MELLITUS WITH PERIPHERAL VASCULAR DISEASE (H): ICD-10-CM

## 2018-08-28 DIAGNOSIS — L97.912 ULCER OF RIGHT LOWER EXTREMITY WITH FAT LAYER EXPOSED (H): ICD-10-CM

## 2018-08-28 DIAGNOSIS — L97.521 SKIN ULCER OF LEFT FOOT, LIMITED TO BREAKDOWN OF SKIN (H): ICD-10-CM

## 2018-08-28 DIAGNOSIS — L97.512 SKIN ULCER OF RIGHT FOOT WITH FAT LAYER EXPOSED (H): Primary | ICD-10-CM

## 2018-08-28 DIAGNOSIS — E11.49 TYPE II OR UNSPECIFIED TYPE DIABETES MELLITUS WITH NEUROLOGICAL MANIFESTATIONS, NOT STATED AS UNCONTROLLED(250.60) (H): ICD-10-CM

## 2018-08-28 PROCEDURE — 99213 OFFICE O/P EST LOW 20 MIN: CPT | Performed by: PODIATRIST
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
## 2018-08-28 NOTE — PATIENT INSTRUCTIONS
Thanks for coming today.  Or[facility] Medicine Clinic  [address] [place] [address]    To schedule future appointments in Ortho Clinic, you may call [phone].    To schedule ordered imaging by your provider:   Call Central Imaging [phone]    To schedule an injection ordered by your provider:  Call Central Imaging Injection scheduling line: [phone]  [employer] available online at:  [email or web address]    Please call if any further questions or concerns ([phone]).  Clinic hours 8 am to 5 pm.    Return to clinic (call) if symptoms worsen or fail to improve.

## 2018-08-28 NOTE — MR AVS SNAPSHOT
After Visit Summary   2018    Amos Walker    MRN: [id number]           Patient Information     Date Of Birth          1945        Visit Information        Provider Department      2018 2:30 PM Brayan Mcclain DPM [facility]        Today's Diagnoses     Skin ulcer of right foot with fat layer exposed (H)    -  1    Ulcer of right lower extremity with fat layer exposed (H)        Skin ulcer of left foot, limited to breakdown of skin (H)        Type II or unspecified type diabetes mellitus with neurological manifestations, not stated as uncontrolled(250.60) (H)        Diabetes mellitus with peripheral vascular disease (H)          Care Instructions    Thanks for coming today.  Ortho[facility] Medicine Clinic  [address] [place] [id number]    To schedule future appointments in Ortho Clinic, you may call [phone].    To schedule ordered imaging by your provider:   Call Central Imaging [phone]    To schedule an injection ordered by your provider:  Call Central Imaging Injection scheduling line: [phone]  [employer] available online at:  [email or web address]    Please call if any further questions or concerns ([phone]).  Clinic hours 8 am to 5 pm.    Return to clinic (call) if symptoms worsen or fail to improve.            Follow-ups after your visit        Your next 10 appointments already scheduled     Sep 04, 2018  9:30 AM CDT   Return Visit with Brayan Mcclain DPM   [facility] ([facility])    [address] [place] [address] [phone]            Sep 06, 2018 11:30 AM CDT   US UPPER EXTREMITY VENOUS MAPPING BILATERAL with UCUSV2   [facility] ([facility])    [address] [address] [phone]           Please bring a list of your medicines (including vitamins, minerals and over-the-counter  drugs). Also, tell your doctor about any allergies you may have. Wear comfortable clothes and leave your valuables at home.  You do not need to do anything special to prepare for your exam.  Please call the Imaging Department at your exam site with any questions.            Sep 06, 2018  1:00 PM CDT   (Arrive by 12:45 PM)   New Vascular Visit with Augusto Maharaj MD   [facility] ([facility])    [address] [facility]  [address] [facility] [address]   [phone]            Sep 11, 2018  9:30 AM CDT   Return Visit with Brayan Mcclain DPM   [facility]    [address] [place] [address]   [phone]            Sep 18, 2018  9:30 AM CDT   Return Visit with Brayan Mcclain DPM   [facility] ([facility])    [address] [place] [address]   [phone]            Sep 19, 2018   Procedure with Rickie Gautam MD   [facility] ([facility])    [address] [facility] [address]   [phone]           The [facility] is located on the corner of [facility] and [facility] on the [facility]. It is easily accessible from virtually any point in the [place] area, via [address] and [address].            Sep 25, 2018  2:30 PM CDT   Return Visit with Brayan Mcclain DPM   [facility]    [address] [facility] [address]   [phone]            Oct 02, 2018  3:00 PM CDT   Return Visit with Brayan Mcclain DPM   [facility])    [address] [facility] [address]   [phone]            Oct 09, 2018  3:00 PM CDT   Return Visit with Brayan Mcclain DPM   [facility] ([facility])    [address] [place] [address]  [facility] [id number] [phone]            Oct 16, 2018  2:30 PM CDT   Return Visit with Brayan Mcclain DPM   [facility] ([facility])    [address] [place] [address] [phone]              Who to contact     If you have questions or need follow up information about today's clinic visit or your schedule please contact [facility] directly at [phone].  Normal or non-critical lab and imaging results will be communicated to you by Vin[employer], letter or phone within 4 business days after the clinic has received the results. If you do not hear from us within 7 days, please contact the clinic through [employer] or phone. If you have a critical or abnormal lab result, we will notify you by phone as soon as possible.  Submit refill requests through [employer] or call your pharmacy and they will forward the refill request to us. Please allow 3 business days for your refill to be completed.          Additional Information About Your Visit        [employer] Information     [employer] gives you secure access to your electronic health record. If you see a primary care provider, you can also send messages to your care team and make appointments. If you have questions, please call your primary care clinic.  If you do not have a primary care provider, please call [phone] and they will assist you.      [employer] is an electronic gateway that provides easy, online access to your medical records. With [employer], you can request a clinic appointment, read your test results, renew a prescription or communicate with your care team.     To access your existing account, please contact your [facility] or call [phone] for assistance.        Care EveryWhere ID     This is your Care EveryWhere ID. This could be used by other organizations to access your [place] medical records  [id number]        Your Vitals Were     Pulse  Temperature Pulse Oximetry             87 97.8  F (36.6  C) (Oral) 99%          Blood Pressure from Last 3 Encounters:   08/28/18 129/69   08/21/18 120/66   08/14/18 140/59    Weight from Last 3 Encounters:   06/29/18 76.7 kg (169 lb 1.6 oz)   06/19/18 76.7 kg (169 lb)   06/08/18 76.7 kg (169 lb)              Today, you had the following     No orders found for display         Today's Medication Changes          These changes are accurate as of 8/28/18  2:54 PM.  If you have any questions, ask your nurse or doctor.               These medicines have changed or have updated prescriptions.        Dose/Directions    * clopidogrel 75 MG tablet   Commonly known as:  PLAVIX   This may have changed:  when to take this   Used for:  Peripheral vascular disease, unspecified        Dose:  75 mg   Take 1 tablet (75 mg) by mouth daily   Quantity:  30 tablet   Refills:  11       * clopidogrel 75 MG tablet   Commonly known as:  PLAVIX   This may have changed:  Another medication with the same name was changed. Make sure you understand how and when to take each.   Used for:  Peripheral vascular disease (H)        Dose:  75 mg   Take 1 tablet (75 mg) by mouth daily   Quantity:  90 tablet   Refills:  0       gentamicin 0.1 % cream   Commonly known as:  GARAMYCIN   This may have changed:    - when to take this  - reasons to take this  - additional instructions   Used for:  Ulcer of right lower leg, with fat layer exposed (H), Chronic venous hypertension with ulcer involving right side (H), Type 2 diabetes, controlled, with neuropathy (H)        Apply topically daily To right leg ulcer.   Quantity:  30 g   Refills:  5       * Notice:  This list has 2 medication(s) that are the same as other medications prescribed for you. Read the directions carefully, and ask your doctor or other care provider to review them with you.             Primary Care Provider Office Phone # Fax #    Racheal Swift [phone] [address]        Equal Access to Services     SAMSON MELTON : Hadii aad ku hadjeannieyomaira Renukager, wajunitoda luqadaha, qaybta kakaylikylee degroottamikakylee, yolanda lunaantoniromulo lopez. So [facility] [phone].    ATENCIÓN: Si habla español, tiene a rodrigues disposición servicios gratuitos de asistencia lingüística. Llame al [phone].    We comply with applicable federal civil rights laws and [place] laws. We do not discriminate on the basis of race, color, national origin, age, disability, sex, sexual orientation, or gender identity.            Thank you!     Thank you for choosing [facility]  for your care. Our goal is always to provide you with excellent care. Hearing back from our patients is one way we can continue to improve our services. Please take a few minutes to complete the written survey that you may receive in the mail after your visit with us. Thank you!             Your Updated Medication List - Protect others around you: Learn how to safely use, store and throw away your medicines at www.disposemymeds.org.          This list is accurate as of 8/28/18  2:54 PM.  Always use your most recent med list.                   Brand Name Dispense Instructions for use Diagnosis    ammonium lactate 12 % cream    LAC-HYDRIN    385 g    Apply topically 2 times daily as needed for dry skin    Venous stasis, Type 2 diabetes, controlled, with neuropathy (H)       ascorbic acid 500 MG Tabs     30 tablet    Take 1 tablet (500 mg) by mouth 2 times daily    Ulcer of right lower leg, with fat layer exposed (H)       ASPIRIN PO      Take 81 mg by mouth daily        blood glucose monitoring lancets     3 Box    Use to test blood sugars 2 as directed.    Type 2 diabetes, uncontrolled, with neuropathy (H)       Blood Pressure Kit     1 kit    1 Device daily    Benign essential hypertension       cefadroxil 500 MG capsule    DURICEF    20 capsule    Take 1 capsule (500 mg) by mouth 2  times daily    Skin ulcer of left foot, limited to breakdown of skin (H), Type II or unspecified type diabetes mellitus with neurological manifestations, not stated as uncontrolled(250.60) (H), Diabetes mellitus with peripheral vascular disease (H)       * clopidogrel 75 MG tablet    PLAVIX    30 tablet    Take 1 tablet (75 mg) by mouth daily    Peripheral vascular disease, unspecified       * clopidogrel 75 MG tablet    PLAVIX    90 tablet    Take 1 tablet (75 mg) by mouth daily    Peripheral vascular disease (H)       ferrous sulfate 325 (65 Fe) MG tablet    IRON    60 tablet    Take 1 tablet (325 mg) by mouth 2 times daily    Peripheral vascular disease, unspecified       gentamicin 0.1 % cream    GARAMYCIN    30 g    Apply topically daily To right leg ulcer.    Ulcer of right lower leg, with fat layer exposed (H), Chronic venous hypertension with ulcer involving right side (H), Type 2 diabetes, controlled, with neuropathy (H)       insulin glargine 100 UNIT/ML injection    LANTUS SOLOSTAR    3 mL    Inject 16 Units Subcutaneous daily (with dinner) May take up to 22 units daily    Type 2 diabetes mellitus with diabetic peripheral angiopathy without gangrene, with long-term current use of insulin (H)       insulin pen needle 31G X 8 MM    B-D U/F    100 each    Use 1 daily as directed    Diabetes mellitus, type II (H)       lisinopril 10 MG tablet    PRINIVIL/ZESTRIL    90 tablet    Take 1 tablet (10 mg) by mouth daily    Benign essential hypertension       * nateglinide 120 MG tablet    STARLIX    90 tablet    TAKE 1 TABLET BY MOUTH THREE TIMES DAILY BEFORE MEALS    Type 2 diabetes, controlled, with neuropathy (H)       * nateglinide 120 MG tablet    STARLIX    90 tablet    Take 1 tablet (120 mg) by mouth 3 times daily (before meals)    Diabetes mellitus (H)       ONETOUCH ULTRA test strip   Generic drug:  blood glucose monitoring     200 strip    USE TO TEST TWICE DAILY OR AS DIRECTED    Type 2 diabetes mellitus  "(H)       OPTIFOAM 6\"X6\" Pads     1 each    1 Box once a week    Ulcer of right leg, with fat layer exposed (H)       order for DME     2 each    Please measure and distribute 1 pair of 20mm Hg - 30mm Hg knee high ULCER CARE open or closed toe compression stockings.  Patient has a size 13 foot and please take this into consideration.  Jobst or equivalent    Varicose veins of lower extremities with other complications, Venous stasis ulcer of right lower extremity (H)       order for DME     3 each    Please measure and distribute 1 pair of 20mmHg - 30mmHg knee high open or closed toe compression stockings. Jobst ultrasheer or equivalent.    Varicose veins of both lower extremities with complications       order for DME     2 each    Please measure and distribute 1 pair of 30mmHg - 40mmHg knee high open toe ulcercare compression stockings. Jobst ultrasheer or equivalent.    Varicose veins of bilateral lower extremities with other complications       sildenafil 50 MG tablet    VIAGRA    10 tablet    Take 1 tablet (50 mg) by mouth daily as needed for erectile dysfunction    Vasculogenic erectile dysfunction, unspecified vasculogenic erectile dysfunction type       silver sulfADIAZINE 1 % cream    SILVADENE    85 g    Apply topically daily To affected areas on right foot and leg.    Ulcer of right lower leg, with fat layer exposed (H), Chronic venous hypertension with ulcer involving right side (H), Type 2 diabetes, controlled, with neuropathy (H)       simvastatin 10 MG tablet    ZOCOR    90 tablet    Take 1 tablet (10 mg) by mouth At Bedtime    Type 2 diabetes, controlled, with neuropathy (H)       triamcinolone 0.1 % cream    KENALOG    60 g    Apply sparingly to left heel daily.    Dermatitis of left foot       VITAMIN C PO      Take 1,000 mg by mouth        VITAMIN D (CHOLECALCIFEROL) PO      Take 1,000 Units by mouth 2 times daily        * Notice:  This list has 4 medication(s) that are the same as other " medications prescribed for you. Read the directions carefully, and ask your doctor or other care provider to review them with you.

## 2018-08-28 NOTE — NURSING NOTE
Amos Walker's chief complaint for this visit includes:  Chief Complaint   Patient presents with     RECHECK     right leg ulcer     PCP: Racheal Swift    Referring Provider:  No referring provider defined for this encounter.    /69  Pulse 87  Temp 97.8  F (36.6  C) (Oral)  SpO2 99%  Data Unavailable     Do you need any medication refills at today's visit? no
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
## 2018-09-04 ENCOUNTER — OFFICE VISIT (OUTPATIENT)
Dept: PODIATRY | Facility: CLINIC | Age: 73
End: 2018-09-04
Payer: MEDICARE

## 2018-09-04 VITALS
DIASTOLIC BLOOD PRESSURE: 62 MMHG | TEMPERATURE: 98.3 F | WEIGHT: 167.3 LBS | OXYGEN SATURATION: 99 % | BODY MASS INDEX: 21.48 KG/M2 | HEART RATE: 98 BPM | SYSTOLIC BLOOD PRESSURE: 127 MMHG

## 2018-09-04 DIAGNOSIS — L97.912 ULCER OF RIGHT LOWER EXTREMITY WITH FAT LAYER EXPOSED (H): Primary | ICD-10-CM

## 2018-09-04 DIAGNOSIS — L97.512 SKIN ULCER OF RIGHT FOOT WITH FAT LAYER EXPOSED (H): ICD-10-CM

## 2018-09-04 DIAGNOSIS — E11.49 TYPE II OR UNSPECIFIED TYPE DIABETES MELLITUS WITH NEUROLOGICAL MANIFESTATIONS, NOT STATED AS UNCONTROLLED(250.60) (H): ICD-10-CM

## 2018-09-04 PROCEDURE — 99213 OFFICE O/P EST LOW 20 MIN: CPT | Performed by: PODIATRIST

## 2018-09-04 ASSESSMENT — PAIN SCALES - GENERAL: PAINLEVEL: NO PAIN (0)
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
## 2018-09-04 NOTE — NURSING NOTE
Amos Walker's goals for this visit include:   Chief Complaint   Patient presents with     Right Leg - Follow Up For, WOUND CARE       He requests these members of his care team be copied on today's visit information: yes     PCP: Racheal Swift    Referring Provider:  No referring provider defined for this encounter.    /62  Pulse 98  Temp 98.3  F (36.8  C) (Oral)  Wt 75.9 kg (167 lb 4.8 oz)  SpO2 99%  BMI 21.48 kg/m2    Do you need any medication refills at today's visit? No     Eneidaorrgee Hernandez CMA

## 2018-09-04 NOTE — MR AVS SNAPSHOT
After Visit Summary   9/4/2018    Amos Walker    MRN: [id number]           Patient Information     Date Of Birth          1945        Visit Information        Provider Department      9/4/2018 9:30 AM Brayan Mcclain DPM [facility]        Today's Diagnoses     Ulcer of right lower extremity with fat layer exposed (H)    -  1    Skin ulcer of right foot with fat layer exposed (H)        Type II or unspecified type diabetes mellitus with neurological manifestations, not stated as uncontrolled(250.60) (H)           Follow-ups after your visit        Your next 10 appointments already scheduled     Sep 06, 2018 11:30 AM CDT   US UPPER EXTREMITY VENOUS MAPPING BILATERAL with UCUSV2   [facility] ([facility])    [address] [place] [address]  [address] [facility] [id number] [phone]           Please bring a list of your medicines (including vitamins, minerals and over-the-counter drugs). Also, tell your doctor about any allergies you may have. Wear comfortable clothes and leave your valuables at home.  You do not need to do anything special to prepare for your exam.  Please call the Imaging Department at your exam site with any questions.            Sep 06, 2018  1:00 PM CDT   (Arrive by 12:45 PM)   New Vascular Visit with Augusto Maharaj MD   [facility] ([facility])    [address] [place] [id number] [phone]            Sep 11, 2018  9:30 AM CDT   Return Visit with Brayan Mcclain DPM   [facility])    [address] [facility] [phone]            Sep 18, 2018  9:30 AM CDT   Return Visit with Brayan Mcclain DPM   [facility] ([facility])    [address] [facility] [phone]            Sep 19, 2018   Procedure with Rickie  Lonnie Gautam MD   [facility] [place] ([facility])    [address]  [facility] [place] [address]   [phone]           The [facility] is located on the corner of [facility] and [facility] on the [facility]. It is easily accessible from virtually any point in the [facility] area, via [address] and [address].            Sep 25, 2018  2:30 PM CDT   Return Visit with Brayan Mcclain DPM   [facility] ([facility])    [address] [place] [address] [phone]            Oct 02, 2018  3:00 PM CDT   Return Visit with Brayan Mcclain DPM   [facility] ([facility])    [address] [place] [address] [phone]            Oct 09, 2018  3:00 PM CDT   Return Visit with Brayan Mcclain DPM   [facility] ([facility])    [address] [place] [address] [phone]            Oct 16, 2018  2:30 PM CDT   Return Visit with Brayan Mcclain DPM   [facility] ([facility])    [address] [place] [address] [phone]            Oct 23, 2018 12:30 PM CDT   Return Visit with Brayan Mcclain DPM   [facility])    [address] [place] [phone]              Who to contact     If you have questions or need follow up information about today's clinic visit or your schedule please contact [facility] directly at [phone].  Normal or non-critical lab and imaging results will be communicated to you by MyChart, letter or phone within 4 business days after the clinic has received the results. If you do not hear from us within 7 days, please contact the clinic through MyChart or phone. If you have a critical or abnormal lab result, we will notify you  by phone as soon as possible.  Submit refill requests through [employer] or call your pharmacy and they will forward the refill request to us. Please allow 3 business days for your refill to be completed.          Additional Information About Your Visit        [employer] Information     [employer] gives you secure access to your electronic health record. If you see a primary care provider, you can also send messages to your care team and make appointments. If you have questions, please call your primary care clinic.  If you do not have a primary care provider, please call [phone] and they will assist you.      [employer] is an electronic gateway that provides easy, online access to your medical records. With [employer], you can request a clinic appointment, read your test results, renew a prescription or communicate with your care team.     To access your existing account, please contact your [facility] or call [phone] for assistance.        Care EveryWhere ID     This is your Care EveryWhere ID. This could be used by other organizations to access your [place] medical records  [id number]        Your Vitals Were     Pulse Temperature Pulse Oximetry BMI (Body Mass Index)          98 98.3  F (36.8  C) (Oral) 99% 21.48 kg/m2         Blood Pressure from Last 3 Encounters:   09/04/18 127/62   08/28/18 129/69   08/21/18 120/66    Weight from Last 3 Encounters:   09/04/18 75.9 kg (167 lb 4.8 oz)   06/29/18 76.7 kg (169 lb 1.6 oz)   06/19/18 76.7 kg (169 lb)              Today, you had the following     No orders found for display         Today's Medication Changes          These changes are accurate as of 9/4/18  9:39 AM.  If you have any questions, ask your nurse or doctor.               These medicines have changed or have updated prescriptions.        Dose/Directions    * clopidogrel 75 MG tablet   Commonly known as:  PLAVIX   This may have changed:  when to take this   Used for:  Peripheral  vascular disease, unspecified        Dose:  75 mg   Take 1 tablet (75 mg) by mouth daily   Quantity:  30 tablet   Refills:  11       * clopidogrel 75 MG tablet   Commonly known as:  PLAVIX   This may have changed:  Another medication with the same name was changed. Make sure you understand how and when to take each.   Used for:  Peripheral vascular disease (H)        Dose:  75 mg   Take 1 tablet (75 mg) by mouth daily   Quantity:  90 tablet   Refills:  0       gentamicin 0.1 % cream   Commonly known as:  GARAMYCIN   This may have changed:    - when to take this  - reasons to take this  - additional instructions   Used for:  Ulcer of right lower leg, with fat layer exposed (H), Chronic venous hypertension with ulcer involving right side (H), Type 2 diabetes, controlled, with neuropathy (H)        Apply topically daily To right leg ulcer.   Quantity:  30 g   Refills:  5       * Notice:  This list has 2 medication(s) that are the same as other medications prescribed for you. Read the directions carefully, and ask your doctor or other care provider to review them with you.             Primary Care Provider Office Phone # Fax #    Racheal Swift [phone] [address] [address]        Equal Access to Services     [facility] LINH : Hadii niurka Bedoya, wajunitoda luroxanna, qaybta kaalmada jose d, yolanda lopez. So [facility] [phone].    ATENCIÓN: Si habla español, tiene a rodrigues disposición servicios gratuitos de asistencia lingüística. Llame al [phone].    We comply with applicable federal civil rights laws and [place] laws. We do not discriminate on the basis of race, color, national origin, age, disability, sex, sexual orientation, or gender identity.            Thank you!     Thank you for choosing [facility]  for your care. Our goal is always to provide you with excellent care. Hearing back from our patients is  one way we can continue to improve our services. Please take a few minutes to complete the written survey that you may receive in the mail after your visit with us. Thank you!             Your Updated Medication List - Protect others around you: Learn how to safely use, store and throw away your medicines at www.disposemymeds.org.          This list is accurate as of 9/4/18  9:39 AM.  Always use your most recent med list.                   Brand Name Dispense Instructions for use Diagnosis    ammonium lactate 12 % cream    LAC-HYDRIN    385 g    Apply topically 2 times daily as needed for dry skin    Venous stasis, Type 2 diabetes, controlled, with neuropathy (H)       ascorbic acid 500 MG Tabs     30 tablet    Take 1 tablet (500 mg) by mouth 2 times daily    Ulcer of right lower leg, with fat layer exposed (H)       ASPIRIN PO      Take 81 mg by mouth daily        blood glucose monitoring lancets     3 Box    Use to test blood sugars 2 as directed.    Type 2 diabetes, uncontrolled, with neuropathy (H)       Blood Pressure Kit     1 kit    1 Device daily    Benign essential hypertension       cefadroxil 500 MG capsule    DURICEF    20 capsule    Take 1 capsule (500 mg) by mouth 2 times daily    Skin ulcer of left foot, limited to breakdown of skin (H), Type II or unspecified type diabetes mellitus with neurological manifestations, not stated as uncontrolled(250.60) (H), Diabetes mellitus with peripheral vascular disease (H)       * clopidogrel 75 MG tablet    PLAVIX    30 tablet    Take 1 tablet (75 mg) by mouth daily    Peripheral vascular disease, unspecified       * clopidogrel 75 MG tablet    PLAVIX    90 tablet    Take 1 tablet (75 mg) by mouth daily    Peripheral vascular disease (H)       ferrous sulfate 325 (65 Fe) MG tablet    IRON    60 tablet    Take 1 tablet (325 mg) by mouth 2 times daily    Peripheral vascular disease, unspecified       gentamicin 0.1 % cream    GARAMYCIN    30 g    Apply topically  "daily To right leg ulcer.    Ulcer of right lower leg, with fat layer exposed (H), Chronic venous hypertension with ulcer involving right side (H), Type 2 diabetes, controlled, with neuropathy (H)       insulin glargine 100 UNIT/ML injection    LANTUS SOLOSTAR    3 mL    Inject 16 Units Subcutaneous daily (with dinner) May take up to 22 units daily    Type 2 diabetes mellitus with diabetic peripheral angiopathy without gangrene, with long-term current use of insulin (H)       insulin pen needle 31G X 8 MM    B-D U/F    100 each    Use 1 daily as directed    Diabetes mellitus, type II (H)       lisinopril 10 MG tablet    PRINIVIL/ZESTRIL    90 tablet    Take 1 tablet (10 mg) by mouth daily    Benign essential hypertension       * nateglinide 120 MG tablet    STARLIX    90 tablet    TAKE 1 TABLET BY MOUTH THREE TIMES DAILY BEFORE MEALS    Type 2 diabetes, controlled, with neuropathy (H)       * nateglinide 120 MG tablet    STARLIX    90 tablet    Take 1 tablet (120 mg) by mouth 3 times daily (before meals)    Diabetes mellitus (H)       ONETOUCH ULTRA test strip   Generic drug:  blood glucose monitoring     200 strip    USE TO TEST TWICE DAILY OR AS DIRECTED    Type 2 diabetes mellitus (H)       OPTIFOAM 6\"X6\" Pads     1 each    1 Box once a week    Ulcer of right leg, with fat layer exposed (H)       order for DME     2 each    Please measure and distribute 1 pair of 20mm Hg - 30mm Hg knee high ULCER CARE open or closed toe compression stockings.  Patient has a size 13 foot and please take this into consideration.  Jobst or equivalent    Varicose veins of lower extremities with other complications, Venous stasis ulcer of right lower extremity (H)       order for DME     3 each    Please measure and distribute 1 pair of 20mmHg - 30mmHg knee high open or closed toe compression stockings. Jobst ultrasheer or equivalent.    Varicose veins of both lower extremities with complications       order for DME     2 each    " Please measure and distribute 1 pair of 30mmHg - 40mmHg knee high open toe ulcercare compression stockings. Jobst ultrasheer or equivalent.    Varicose veins of bilateral lower extremities with other complications       sildenafil 50 MG tablet    VIAGRA    10 tablet    Take 1 tablet (50 mg) by mouth daily as needed for erectile dysfunction    Vasculogenic erectile dysfunction, unspecified vasculogenic erectile dysfunction type       silver sulfADIAZINE 1 % cream    SILVADENE    85 g    Apply topically daily To affected areas on right foot and leg.    Ulcer of right lower leg, with fat layer exposed (H), Chronic venous hypertension with ulcer involving right side (H), Type 2 diabetes, controlled, with neuropathy (H)       simvastatin 10 MG tablet    ZOCOR    90 tablet    Take 1 tablet (10 mg) by mouth At Bedtime    Type 2 diabetes, controlled, with neuropathy (H)       triamcinolone 0.1 % cream    KENALOG    60 g    Apply sparingly to left heel daily.    Dermatitis of left foot       VITAMIN C PO      Take 1,000 mg by mouth        VITAMIN D (CHOLECALCIFEROL) PO      Take 1,000 Units by mouth 2 times daily        * Notice:  This list has 4 medication(s) that are the same as other medications prescribed for you. Read the directions carefully, and ask your doctor or other care provider to review them with you.

## 2018-09-06 ENCOUNTER — OFFICE VISIT (OUTPATIENT)
Dept: VASCULAR SURGERY | Facility: CLINIC | Age: 73
End: 2018-09-06
Payer: MEDICARE

## 2018-09-06 ENCOUNTER — RADIANT APPOINTMENT (OUTPATIENT)
Dept: ULTRASOUND IMAGING | Facility: CLINIC | Age: 73
End: 2018-09-06
Attending: SURGERY
Payer: MEDICARE

## 2018-09-06 VITALS — OXYGEN SATURATION: 99 % | HEART RATE: 87 BPM | DIASTOLIC BLOOD PRESSURE: 78 MMHG | SYSTOLIC BLOOD PRESSURE: 160 MMHG

## 2018-09-06 DIAGNOSIS — I10 ESSENTIAL HYPERTENSION: ICD-10-CM

## 2018-09-06 DIAGNOSIS — Z01.818 PRE-OP EXAM: Primary | ICD-10-CM

## 2018-09-06 DIAGNOSIS — E11.40 TYPE 2 DIABETES, CONTROLLED, WITH NEUROPATHY (H): ICD-10-CM

## 2018-09-06 DIAGNOSIS — Z01.818 PRE-OP EXAM: ICD-10-CM

## 2018-09-06 DIAGNOSIS — I70.233 ATHEROSCLEROSIS OF NATIVE ARTERIES OF RIGHT LEG WITH ULCERATION OF ANKLE (H): ICD-10-CM

## 2018-09-06 DIAGNOSIS — N18.30 CKD (CHRONIC KIDNEY DISEASE) STAGE 3, GFR 30-59 ML/MIN (H): ICD-10-CM

## 2018-09-06 LAB
ANION GAP SERPL CALCULATED.3IONS-SCNC: 8 MMOL/L (ref 3–14)
BUN SERPL-MCNC: 32 MG/DL (ref 7–30)
CALCIUM SERPL-MCNC: 9 MG/DL (ref 8.5–10.1)
CHLORIDE SERPL-SCNC: 103 MMOL/L (ref 94–109)
CO2 SERPL-SCNC: 24 MMOL/L (ref 20–32)
CREAT SERPL-MCNC: 1.18 MG/DL (ref 0.66–1.25)
ERYTHROCYTE [DISTWIDTH] IN BLOOD BY AUTOMATED COUNT: 13.8 % (ref 10–15)
GFR SERPL CREATININE-BSD FRML MDRD: 61 ML/MIN/1.7M2
GLUCOSE SERPL-MCNC: 144 MG/DL (ref 70–99)
HCT VFR BLD AUTO: 34 % (ref 40–53)
HGB BLD-MCNC: 10.9 G/DL (ref 13.3–17.7)
MCH RBC QN AUTO: 31.1 PG (ref 26.5–33)
MCHC RBC AUTO-ENTMCNC: 32.1 G/DL (ref 31.5–36.5)
MCV RBC AUTO: 97 FL (ref 78–100)
PLATELET # BLD AUTO: 218 10E9/L (ref 150–450)
POTASSIUM SERPL-SCNC: 4.5 MMOL/L (ref 3.4–5.3)
RBC # BLD AUTO: 3.51 10E12/L (ref 4.4–5.9)
SODIUM SERPL-SCNC: 135 MMOL/L (ref 133–144)
WBC # BLD AUTO: 5.9 10E9/L (ref 4–11)

## 2018-09-06 ASSESSMENT — PAIN SCALES - GENERAL: PAINLEVEL: NO PAIN (0)

## 2018-09-06 NOTE — PATIENT INSTRUCTIONS
I  Have  schedule your procedure with Dr Maharaj and Dr Jenkins for Friday 9/14/18.  Please refer to the surgery letter I have given you for the detailed information.    You will need to hold your Plavix for five days prior.  You should continue to take your daily aspirin.    You will get a call from an RN from the hospital to go over instructions about medications. They will instruct you on your other medications.   Please do not have anything to eat or drink after midnight.    Please call with any questions or concerns at any time.    Catia Lowery RN  [phone]

## 2018-09-06 NOTE — LETTER
9/6/2018       RE: Amos Walkre  [address]  [facility] [address]     Dear Colleague,    Thank you for referring your patient, Amos Walker, to the [facility]ULAR [facility] at [facility]. Please see a copy of my visit note below.    VASCULAR SURGERY CLINIC NOTE    CC:  Chief Complaint   Patient presents with     Follow Up For     Amos is returning to discuss non healing wound.       HPI:    This is a follow-up visit for this 73 year old year old male with a past medical history significant for CAD, DM2, HTN, CKD Stage 3, and tobacco abuse who returns for evaluation of nonhealing ulcers in the right foot. When last seen 7/26, the patient had large ulcers of the right dorsal and lateral foot with minimal evidence of wound healing. A recent CTA confirmed multi-level occlusive disease with critical stenoses in the external iliac, common femoral and popliteal arteries.  Due to the fact that he required Plavix until 9/8 for drug-eluting stents, I made the decision to postpone any consideration of intervention until after 9/8. Her is here today for re-evaluation. In the interim, he reports slow but steady healing with decreased drainage. No fevers or chills. Note that the patient has been previously seen by Dr. Jeferson Jenkins of [facility].    Review Of Systems  Eyes: negative  Respiratory: No shortness of breath, dyspnea on exertion, cough, or hemoptysis  Cardiovascular: negative,. No chest pain or CHF symptoms  Musculoskeletal: negative  Neurologic: negative  Endocrine: diabetes    Past Medical History:   Diagnosis Date     Anemia      CKD (chronic kidney disease) stage 3, GFR 30-59 ml/min      Heart disease      HTN (hypertension)      Hyperlipidemia      MRSA cellulitis of right foot     in past.      PAD (peripheral artery disease) (H)     s/p stenting in R leg     Tobacco use     50+ pack     Type 2 diabetes mellitus (H)     for 25 yrs.  on insulin and starlix      "Venous ulcer          Current Outpatient Prescriptions:      ammonium lactate (LAC-HYDRIN) 12 % cream, Apply topically 2 times daily as needed for dry skin, Disp: 385 g, Rfl: 3     Ascorbic Acid (VITAMIN C PO), Take 1,000 mg by mouth, Disp: , Rfl:      ascorbic acid 500 MG TABS, Take 1 tablet (500 mg) by mouth 2 times daily, Disp: 30 tablet, Rfl:      ASPIRIN PO, Take 81 mg by mouth daily, Disp: , Rfl:      blood glucose monitoring (FREESTYLE) lancets, Use to test blood sugars 2 as directed., Disp: 3 Box, Rfl: 3     Blood Pressure KIT, 1 Device daily, Disp: 1 kit, Rfl: 0     cefadroxil (DURICEF) 500 MG capsule, Take 1 capsule (500 mg) by mouth 2 times daily, Disp: 20 capsule, Rfl: 0     clopidogrel (PLAVIX) 75 MG tablet, Take 1 tablet (75 mg) by mouth daily, Disp: 90 tablet, Rfl: 0     clopidogrel (PLAVIX) 75 MG tablet, Take 1 tablet (75 mg) by mouth daily (Patient taking differently: Take 75 mg by mouth every evening ), Disp: 30 tablet, Rfl: 11     ferrous sulfate (IRON) 325 (65 FE) MG tablet, Take 1 tablet (325 mg) by mouth 2 times daily, Disp: 60 tablet, Rfl: 11     Gauze Pads & Dressings (OPTIFOAM) 6\"X6\" PADS, 1 Box once a week, Disp: 1 each, Rfl: 6     gentamicin (GARAMYCIN) 0.1 % cream, Apply topically daily To right leg ulcer. (Patient taking differently: Apply topically daily as needed To right leg ulcer.), Disp: 30 g, Rfl: 5     insulin glargine (LANTUS SOLOSTAR) 100 UNIT/ML pen, Inject 16 Units Subcutaneous daily (with dinner) May take up to 22 units daily, Disp: 3 mL, Rfl: 3     insulin pen needle (B-D U/F) 31G X 8 MM, Use 1 daily as directed, Disp: 100 each, Rfl: 1     lisinopril (PRINIVIL/ZESTRIL) 10 MG tablet, Take 1 tablet (10 mg) by mouth daily, Disp: 90 tablet, Rfl: 3     nateglinide (STARLIX) 120 MG tablet, Take 1 tablet (120 mg) by mouth 3 times daily (before meals), Disp: 90 tablet, Rfl: 3     nateglinide (STARLIX) 120 MG tablet, TAKE 1 TABLET BY MOUTH THREE TIMES DAILY BEFORE MEALS, Disp: 90 " tablet, Rfl: 11     ONETOUCH ULTRA test strip, USE TO TEST TWICE DAILY OR AS DIRECTED, Disp: 200 strip, Rfl: 3     order for DME, Please measure and distribute 1 pair of 30mmHg - 40mmHg knee high open toe ulcercare compression stockings. Jobst ultrasheer or equivalent., Disp: 2 each, Rfl: 6     order for DME, Please measure and distribute 1 pair of 20mmHg - 30mmHg knee high open or closed toe compression stockings. Jobst ultrasheer or equivalent., Disp: 3 each, Rfl: 12     order for DME, Please measure and distribute 1 pair of 20mm Hg - 30mm Hg knee high ULCER CARE open or closed toe compression stockings.  Patient has a size 13 foot and please take this into consideration.  Jobst or equivalent, Disp: 2 each, Rfl: 1     sildenafil (VIAGRA) 50 MG tablet, Take 1 tablet (50 mg) by mouth daily as needed for erectile dysfunction, Disp: 10 tablet, Rfl: 11     silver sulfADIAZINE (SILVADENE) 1 % cream, Apply topically daily To affected areas on right foot and leg., Disp: 85 g, Rfl: 5     simvastatin (ZOCOR) 10 MG tablet, Take 1 tablet (10 mg) by mouth At Bedtime, Disp: 90 tablet, Rfl: 3     triamcinolone (KENALOG) 0.1 % cream, Apply sparingly to left heel daily., Disp: 60 g, Rfl: 1     VITAMIN D, CHOLECALCIFEROL, PO, Take 1,000 Units by mouth 2 times daily, Disp: , Rfl:        Allergies   Allergen Reactions     Lisinopril Other (See Comments)     dizziness     Neomycin Other (See Comments)     Wound gets worse     Methylchloroisothiazolinone [Methylisothiazolinone] Rash     Povidone Iodine Rash         PHYSICAL EXAM:  Vital signs:  /71  Pulse 87  SpO2 99%  General: Well developed, well nourished male in no acute distress  HEENT: PIOTR, EOMI, sclera non-icteric  Neck: Normal contours. Carotids with normal upstrokes, no bruits auscultated  Heart: S1, S2 normal. No murmurs, gallops, or rub. No heaves or thrills  Lungs: Clear to auscultation.    Upper Extremities: Normal contours. 2+ radial and brachial pulses  bilaterally. No edema or cyanosis. Superficial veins appear small and unsuitable for bypass conduit.  Lower Extremities: Large uleeration on dorsal surface of right ankle with open area on lower third with exposed tendon. 2 cm ulceration on lateral foot with contraction compared to 6 weeks ago, yet still with necrotic area on distal third.  PULSES:Femoral:right 2+, left 1+. No palpable pulses below the groins on either side.  Neurologic: CN II-XII grossly intact and symmetrical.     Image studies: Upper extremity vein mapping reviewed. Cephalic veins bilaterally and left basilic vein are inadequate for bypass conduit. Right basilic vein diameters appear adequate, but the length of conduit is insufficient for fem-PT bypass.    ASSESSMENT:  Critical right limb ischemia with ulcerations due to multilevel; occlusive disease. The patient does not have adequate autologous options for bypass conduit. His best option is to undergo right femoral endarterectomy and endovascular treatment of the right SFA and popliteal artery occlusions.     PLAN:  Schedule right femoral endarterectomy on 9/14. Dr. Jeferson Jenkins has been involved with the evaluataion and will be available to perform the endovascular portions of the procedure.      ADAM Maharaj MD, FACS, RPVI  Professor of Surgery  Division of Vascular and Endovascular Surgery  [facility]  Cell: [phone]  [email or web address]

## 2018-09-06 NOTE — MR AVS SNAPSHOT
After Visit Summary   9/6/2018    Amos Walker    MRN: [id number]           Patient Information     Date Of Birth          1945        Visit Information        Provider Department      9/6/2018 1:00 PM Augusto Maharaj MD [facility]        Care Instructions    I  Have  schedule your procedure with Dr Maharaj and Dr Jenkins for Friday 9/14/18.  Please refer to the surgery letter I have given you for the detailed information.    You will need to hold your Plavix for five days prior.  You should continue to take your daily aspirin.    You will get a call from an RN from the hospital to go over instructions about medications. They will instruct you on your other medications.   Please do not have anything to eat or drink after midnight.    Please call with any questions or concerns at any time.    Catia Lowery RN  [phone]          Follow-ups after your visit        Your next 10 appointments already scheduled     Sep 11, 2018  9:30 AM CDT   Return Visit with Brayan Mcclain DPM   [facility])    [address] [place] [id number] [phone]            Sep 18, 2018  9:30 AM CDT   Return Visit with Brayan Mcclain DPM   [facility])    [address] [place] [id number] [phone]            Sep 19, 2018   Procedure with Rickie Gautam MD   [facility] [place], Endoscopy ([facility], [facility])    [address] [facility] [id number] [phone]           The [facility] is located on the corner of [facility] and [facility] on the [facility]. It is easily accessible from virtually any point in the [facility] area, via [address] and [address].            Sep 25, 2018  2:30 PM CDT   Return Visit with Brayan Mcclain DPM   [facility] ([facility]  [facility])    [address] [place] [address]   [phone]            Oct 02, 2018  3:00 PM CDT   Return Visit with Brayan Mclcain DPM   [facility] ([facility])    [address] [place] [address]   [phone]            Oct 09, 2018  3:00 PM CDT   Return Visit with Brayan Mcclain DPM   [facility] ([facility])    [address] [place] [address]   [phone]            Oct 16, 2018  2:30 PM CDT   Return Visit with Brayan Mcclain DPM   [facility] ([facility])    [address] [place] [address]   [phone]            Oct 23, 2018 12:30 PM CDT   Return Visit with Brayan Mcclain DPM   [facility] ([facility])    [address] [place] [address]   [phone]            Nov 05, 2018  1:40 PM CST   (Arrive by 1:25 PM)   Return Visit with Racheal Swift MD   [facility] ([facility])    [address] [place] [address] [phone]            Nov 06, 2018  3:00 PM CST   Return Visit with Brayan Mcclain DPM   [facility] ([facility])    [address] [place] [address]   [phone]              Who to contact     Please call your clinic at [phone] to:    Ask questions about your health    Make or cancel appointments    Discuss your medicines    Learn about your test results    Speak to your doctor            Additional Information About Your Visit        MyChart Information     MyChart gives you secure access to your electronic health record. If you see a primary care provider, you can also send messages to your care team and make appointments. If you have questions, please call your primary care clinic.  If you do not have a primary care provider, please call  [phone] and they will assist you.      [employer] is an electronic gateway that provides easy, online access to your medical records. With [employer], you can request a clinic appointment, read your test results, renew a prescription or communicate with your care team.     To access your existing account, please contact your [facility] or call [phone] for assistance.        Care EveryWhere ID     This is your Care EveryWhere ID. This could be used by other organizations to access your [place] medical records  [id number]        Your Vitals Were     Pulse Pulse Oximetry                87 99%           Blood Pressure from Last 3 Encounters:   09/06/18 160/78   09/04/18 127/62   08/28/18 129/69    Weight from Last 3 Encounters:   09/04/18 167 lb 4.8 oz   06/29/18 169 lb 1.6 oz   06/19/18 169 lb              Today, you had the following     No orders found for display         Today's Medication Changes          These changes are accurate as of 9/6/18  1:59 PM.  If you have any questions, ask your nurse or doctor.               These medicines have changed or have updated prescriptions.        Dose/Directions    * clopidogrel 75 MG tablet   Commonly known as:  PLAVIX   This may have changed:  when to take this   Used for:  Peripheral vascular disease, unspecified        Dose:  75 mg   Take 1 tablet (75 mg) by mouth daily   Quantity:  30 tablet   Refills:  11       * clopidogrel 75 MG tablet   Commonly known as:  PLAVIX   This may have changed:  Another medication with the same name was changed. Make sure you understand how and when to take each.   Used for:  Peripheral vascular disease (H)        Dose:  75 mg   Take 1 tablet (75 mg) by mouth daily   Quantity:  90 tablet   Refills:  0       gentamicin 0.1 % cream   Commonly known as:  GARAMYCIN   This may have changed:    - when to take this  - reasons to take this  - additional instructions   Used for:  Ulcer of right lower leg, with  fat layer exposed (H), Chronic venous hypertension with ulcer involving right side (H), Type 2 diabetes, controlled, with neuropathy (H)        Apply topically daily To right leg ulcer.   Quantity:  30 g   Refills:  5       * Notice:  This list has 2 medication(s) that are the same as other medications prescribed for you. Read the directions carefully, and ask your doctor or other care provider to review them with you.             Primary Care Provider Office Phone # Fax #    Racheal Swift [phone] [address] [address]        Equal Access to Services     [facility]: Hadii niurka webb hadasho Soomaali, waaxda luqadaha, qaybta kaalmada jose d, yolanda duran . So [facility] [phone].    ATENCIÓN: Si habla español, tiene a rodrigues disposición servicios gratuitos de asistencia lingüística. [facility] [phone].    We comply with applicable federal civil rights laws and [place] laws. We do not discriminate on the basis of race, color, national origin, age, disability, sex, sexual orientation, or gender identity.            Thank you!     Thank you for choosing [facility] VASCULAR [facility]INIC  for your care. Our goal is always to provide you with excellent care. Hearing back from our patients is one way we can continue to improve our services. Please take a few minutes to complete the written survey that you may receive in the mail after your visit with us. Thank you!             Your Updated Medication List - Protect others around you: Learn how to safely use, store and throw away your medicines at www.disposemymeds.org.          This list is accurate as of 9/6/18  1:59 PM.  Always use your most recent med list.                   Brand Name Dispense Instructions for use Diagnosis    ammonium lactate 12 % cream    LAC-HYDRIN    385 g    Apply topically 2 times daily as needed for dry skin    Venous stasis, Type 2 diabetes, controlled, with  neuropathy (H)       ascorbic acid 500 MG Tabs     30 tablet    Take 1 tablet (500 mg) by mouth 2 times daily    Ulcer of right lower leg, with fat layer exposed (H)       ASPIRIN PO      Take 81 mg by mouth daily        blood glucose monitoring lancets     3 Box    Use to test blood sugars 2 as directed.    Type 2 diabetes, uncontrolled, with neuropathy (H)       Blood Pressure Kit     1 kit    1 Device daily    Benign essential hypertension       cefadroxil 500 MG capsule    DURICEF    20 capsule    Take 1 capsule (500 mg) by mouth 2 times daily    Skin ulcer of left foot, limited to breakdown of skin (H), Type II or unspecified type diabetes mellitus with neurological manifestations, not stated as uncontrolled(250.60) (H), Diabetes mellitus with peripheral vascular disease (H)       * clopidogrel 75 MG tablet    PLAVIX    30 tablet    Take 1 tablet (75 mg) by mouth daily    Peripheral vascular disease, unspecified       * clopidogrel 75 MG tablet    PLAVIX    90 tablet    Take 1 tablet (75 mg) by mouth daily    Peripheral vascular disease (H)       ferrous sulfate 325 (65 Fe) MG tablet    IRON    60 tablet    Take 1 tablet (325 mg) by mouth 2 times daily    Peripheral vascular disease, unspecified       gentamicin 0.1 % cream    GARAMYCIN    30 g    Apply topically daily To right leg ulcer.    Ulcer of right lower leg, with fat layer exposed (H), Chronic venous hypertension with ulcer involving right side (H), Type 2 diabetes, controlled, with neuropathy (H)       insulin glargine 100 UNIT/ML injection    LANTUS SOLOSTAR    3 mL    Inject 16 Units Subcutaneous daily (with dinner) May take up to 22 units daily    Type 2 diabetes mellitus with diabetic peripheral angiopathy without gangrene, with long-term current use of insulin (H)       insulin pen needle 31G X 8 MM    B-D U/F    100 each    Use 1 daily as directed    Diabetes mellitus, type II (H)       lisinopril 10 MG tablet    PRINIVIL/ZESTRIL    90 tablet     "Take 1 tablet (10 mg) by mouth daily    Benign essential hypertension       * nateglinide 120 MG tablet    STARLIX    90 tablet    TAKE 1 TABLET BY MOUTH THREE TIMES DAILY BEFORE MEALS    Type 2 diabetes, controlled, with neuropathy (H)       * nateglinide 120 MG tablet    STARLIX    90 tablet    Take 1 tablet (120 mg) by mouth 3 times daily (before meals)    Diabetes mellitus (H)       ONETOUCH ULTRA test strip   Generic drug:  blood glucose monitoring     200 strip    USE TO TEST TWICE DAILY OR AS DIRECTED    Type 2 diabetes mellitus (H)       OPTIFOAM 6\"X6\" Pads     1 each    1 Box once a week    Ulcer of right leg, with fat layer exposed (H)       order for DME     2 each    Please measure and distribute 1 pair of 20mm Hg - 30mm Hg knee high ULCER CARE open or closed toe compression stockings.  Patient has a size 13 foot and please take this into consideration.  Jobst or equivalent    Varicose veins of lower extremities with other complications, Venous stasis ulcer of right lower extremity (H)       order for DME     3 each    Please measure and distribute 1 pair of 20mmHg - 30mmHg knee high open or closed toe compression stockings. Jobst ultrasheer or equivalent.    Varicose veins of both lower extremities with complications       order for DME     2 each    Please measure and distribute 1 pair of 30mmHg - 40mmHg knee high open toe ulcercare compression stockings. Jobst ultrasheer or equivalent.    Varicose veins of bilateral lower extremities with other complications       sildenafil 50 MG tablet    VIAGRA    10 tablet    Take 1 tablet (50 mg) by mouth daily as needed for erectile dysfunction    Vasculogenic erectile dysfunction, unspecified vasculogenic erectile dysfunction type       silver sulfADIAZINE 1 % cream    SILVADENE    85 g    Apply topically daily To affected areas on right foot and leg.    Ulcer of right lower leg, with fat layer exposed (H), Chronic venous hypertension with ulcer involving right " side (H), Type 2 diabetes, controlled, with neuropathy (H)       simvastatin 10 MG tablet    ZOCOR    90 tablet    Take 1 tablet (10 mg) by mouth At Bedtime    Type 2 diabetes, controlled, with neuropathy (H)       triamcinolone 0.1 % cream    KENALOG    60 g    Apply sparingly to left heel daily.    Dermatitis of left foot       VITAMIN C PO      Take 1,000 mg by mouth        VITAMIN D (CHOLECALCIFEROL) PO      Take 1,000 Units by mouth 2 times daily        * Notice:  This list has 4 medication(s) that are the same as other medications prescribed for you. Read the directions carefully, and ask your doctor or other care provider to review them with you.

## 2018-09-06 NOTE — PROGRESS NOTES
VASCULAR SURGERY CLINIC NOTE    CC:  Chief Complaint   Patient presents with     Follow Up For     Amos is returning to discuss non healing wound.       HPI:    This is a follow-up visit for this 73 year old year old male with a past medical history significant for CAD, DM2, HTN, CKD Stage 3, and tobacco abuse who returns for evaluation of nonhealing ulcers in the right foot. When last seen 7/26, the patient had large ulcers of the right dorsal and lateral foot with minimal evidence of wound healing. A recent CTA confirmed multi-level occlusive disease with critical stenoses in the external iliac, common femoral and popliteal arteries.  Due to the fact that he required Plavix until 9/8 for drug-eluting stents, I made the decision to postpone any consideration of intervention until after 9/8. Her is here today for re-evaluation. In the interim, he reports slow but steady healing with decreased drainage. No fevers or chills. Note that the patient has been previously seen by Dr. Jeferson Jenkins of IR.    Review Of Systems  Eyes: negative  Respiratory: No shortness of breath, dyspnea on exertion, cough, or hemoptysis  Cardiovascular: negative,. No chest pain or CHF symptoms  Musculoskeletal: negative  Neurologic: negative  Endocrine: diabetes    Past Medical History:   Diagnosis Date     Anemia      CKD (chronic kidney disease) stage 3, GFR 30-59 ml/min      Heart disease      HTN (hypertension)      Hyperlipidemia      MRSA cellulitis of right foot     in past.      PAD (peripheral artery disease) (H)     s/p stenting in R leg     Tobacco use     50+ pack     Type 2 diabetes mellitus (H)     for 25 yrs.  on insulin and starlix     Venous ulcer          Current Outpatient Prescriptions:      ammonium lactate (LAC-HYDRIN) 12 % cream, Apply topically 2 times daily as needed for dry skin, Disp: 385 g, Rfl: 3     Ascorbic Acid (VITAMIN C PO), Take 1,000 mg by mouth, Disp: , Rfl:      ascorbic acid 500 MG TABS, Take 1 tablet  "(500 mg) by mouth 2 times daily, Disp: 30 tablet, Rfl:      ASPIRIN PO, Take 81 mg by mouth daily, Disp: , Rfl:      blood glucose monitoring (FREESTYLE) lancets, Use to test blood sugars 2 as directed., Disp: 3 Box, Rfl: 3     Blood Pressure KIT, 1 Device daily, Disp: 1 kit, Rfl: 0     cefadroxil (DURICEF) 500 MG capsule, Take 1 capsule (500 mg) by mouth 2 times daily, Disp: 20 capsule, Rfl: 0     clopidogrel (PLAVIX) 75 MG tablet, Take 1 tablet (75 mg) by mouth daily, Disp: 90 tablet, Rfl: 0     clopidogrel (PLAVIX) 75 MG tablet, Take 1 tablet (75 mg) by mouth daily (Patient taking differently: Take 75 mg by mouth every evening ), Disp: 30 tablet, Rfl: 11     ferrous sulfate (IRON) 325 (65 FE) MG tablet, Take 1 tablet (325 mg) by mouth 2 times daily, Disp: 60 tablet, Rfl: 11     Gauze Pads & Dressings (OPTIFOAM) 6\"X6\" PADS, 1 Box once a week, Disp: 1 each, Rfl: 6     gentamicin (GARAMYCIN) 0.1 % cream, Apply topically daily To right leg ulcer. (Patient taking differently: Apply topically daily as needed To right leg ulcer.), Disp: 30 g, Rfl: 5     insulin glargine (LANTUS SOLOSTAR) 100 UNIT/ML pen, Inject 16 Units Subcutaneous daily (with dinner) May take up to 22 units daily, Disp: 3 mL, Rfl: 3     insulin pen needle (B-D U/F) 31G X 8 MM, Use 1 daily as directed, Disp: 100 each, Rfl: 1     lisinopril (PRINIVIL/ZESTRIL) 10 MG tablet, Take 1 tablet (10 mg) by mouth daily, Disp: 90 tablet, Rfl: 3     nateglinide (STARLIX) 120 MG tablet, Take 1 tablet (120 mg) by mouth 3 times daily (before meals), Disp: 90 tablet, Rfl: 3     nateglinide (STARLIX) 120 MG tablet, TAKE 1 TABLET BY MOUTH THREE TIMES DAILY BEFORE MEALS, Disp: 90 tablet, Rfl: 11     ONETOUCH ULTRA test strip, USE TO TEST TWICE DAILY OR AS DIRECTED, Disp: 200 strip, Rfl: 3     order for DME, Please measure and distribute 1 pair of 30mmHg - 40mmHg knee high open toe ulcercare compression stockings. Jobst ultrasheer or equivalent., Disp: 2 each, Rfl: 6     " order for DME, Please measure and distribute 1 pair of 20mmHg - 30mmHg knee high open or closed toe compression stockings. Jobst ultrasheer or equivalent., Disp: 3 each, Rfl: 12     order for DME, Please measure and distribute 1 pair of 20mm Hg - 30mm Hg knee high ULCER CARE open or closed toe compression stockings.  Patient has a size 13 foot and please take this into consideration.  Jobst or equivalent, Disp: 2 each, Rfl: 1     sildenafil (VIAGRA) 50 MG tablet, Take 1 tablet (50 mg) by mouth daily as needed for erectile dysfunction, Disp: 10 tablet, Rfl: 11     silver sulfADIAZINE (SILVADENE) 1 % cream, Apply topically daily To affected areas on right foot and leg., Disp: 85 g, Rfl: 5     simvastatin (ZOCOR) 10 MG tablet, Take 1 tablet (10 mg) by mouth At Bedtime, Disp: 90 tablet, Rfl: 3     triamcinolone (KENALOG) 0.1 % cream, Apply sparingly to left heel daily., Disp: 60 g, Rfl: 1     VITAMIN D, CHOLECALCIFEROL, PO, Take 1,000 Units by mouth 2 times daily, Disp: , Rfl:        Allergies   Allergen Reactions     Lisinopril Other (See Comments)     dizziness     Neomycin Other (See Comments)     Wound gets worse     Methylchloroisothiazolinone [Methylisothiazolinone] Rash     Povidone Iodine Rash         PHYSICAL EXAM:  Vital signs:  /71  Pulse 87  SpO2 99%  General: Well developed, well nourished male in no acute distress  HEENT: PIOTR, EOMI, sclera non-icteric  Neck: Normal contours. Carotids with normal upstrokes, no bruits auscultated  Heart: S1, S2 normal. No murmurs, gallops, or rub. No heaves or thrills  Lungs: Clear to auscultation.    Upper Extremities: Normal contours. 2+ radial and brachial pulses bilaterally. No edema or cyanosis. Superficial veins appear small and unsuitable for bypass conduit.  Lower Extremities: Large uleeration on dorsal surface of right ankle with open area on lower third with exposed tendon. 2 cm ulceration on lateral foot with contraction compared to 6 weeks ago, yet  still with necrotic area on distal third.  PULSES:Femoral:right 2+, left 1+. No palpable pulses below the groins on either side.  Neurologic: CN II-XII grossly intact and symmetrical.     Image studies: Upper extremity vein mapping reviewed. Cephalic veins bilaterally and left basilic vein are inadequate for bypass conduit. Right basilic vein diameters appear adequate, but the length of conduit is insufficient for fem-PT bypass.    ASSESSMENT:  Critical right limb ischemia with ulcerations due to multilevel; occlusive disease. The patient does not have adequate autologous options for bypass conduit. His best option is to undergo right femoral endarterectomy and endovascular treatment of the right SFA and popliteal artery occlusions.     PLAN:  Schedule right femoral endarterectomy on 9/14. Dr. Jeferson Jenkins has been involved with the evaluataion and will be available to perform the endovascular portions of the procedure.      ADAM Maharaj MD, FACS, RPVI  Professor of Surgery  Division of Vascular and Endovascular Surgery  [facility]  Cell: [phone]  [email or web address]

## 2018-09-06 NOTE — LETTER
Amos Walker  [address]  [place] [address]      SURGERY PACKET            Your surgery is scheduled:    Date: 9/14/2018  ________________________________    Time: 7:30 am  ________________________________    Please arrive at:  5:30 am  ______________________    Surgeon's Name: Dr Augusto Maharaj  _______________________        Pre-Op Physical Fax Numbers:          [facility] Pre-Admissions  Fax: [phone]  Phone: [phone]        Your surgery is located at:      [facility], [address] [address]      [phone]      [email or web address]       Before Your Surgery  For Patients and Visitors at [facility]    Welcome  As you get ready for surgery, you may have a lot of questions.  This brochure will help you know what to expect before and after surgery.  You and your family are the most important members of your health care team.  You will need to take an active role in your care.    Be sure to ask questions and learn all that you can about your surgery.  If you have any safety concerns, we urge you to tell a nurse as soon as possible.   This brochure is for information only.  It does not replace the advice of your doctor.  Always follow your doctor's advice.    Please tell us if you need a .    GETTING READY FOR SURGERY  Always follow your surgeon's instructions.  If you don't, your surgery could be canceled.  Please use the following checklist.    Within 30 Days of Surgery:    Have a pre-surgery physical exam with your family doctor or partner.    If you use a [facility], all of your information from the pre-op physical will be in the [employer] computer system.    Ask the doctor to send all of your results to the hospital before the surgery.  The doctor also may ask you to bring the results with you on the day of surgery or you can fax them to [phone].  Tell the doctor  if:    You are allergic to latex or rubber  (Latex and rubber gloves are often used in medical care).    You are taking any medicines (including aspirin), vitamins (Vitamin E, Fish Oil, Omegas) or herbal products.  You will need to stop taking some medicines before surgery.    You have any medical problems (allergies, diabetes or heart disease, for example).    You have a pacemaker or an AICD (automatic implanted cardiac defibrillator).  If you do, please bring the ID card with you on the day of surgery.    You are a smoker.  People who smoke have a higher risk of infection after surgery.  Ask your doctor how you can quit smoking.  Within 7 days of Surgery:    Pre-register with the hospital.  Please use the hospital's phone number listed on the first page of this brochure.  Or, to register online, go to [email or web address].      Prior to your surgical procedure, a nurse will be contacting you to obtain a health history ([phone]).  Additionally, someone from the Admissions Department will also contact you for preregistration ([phone]).      Call your insurance company.  Ask if you need pre-approval for your surgery.  If you do not have insurance, please let us know.      Arrange for someone to drive you home after surgery.  If you will have same-day surgery, you may not drive or take public transportation home by yourself.    Arrange for someone to stay with you for 24 hours after you go home.  This person must be a responsible adult (ie- Family member or friend).  The Day Before Surgery:     Call your surgeon if there are any changes in your health.  This includes signs of a cold or flu (such as a sore throat, runny nose, cough, rash or fever).    Do not smoke, drink alcohol or take over-the-counter medicine (unless your surgeon tells you to) for 24 hours before and after surgery.    If you take prescribed drugs:  You may need to stop them until after the surgery.  Follow your doctor's orders.  You may  resume Aspirin and/or blood thinners after your surgery as directed by your physician/surgeon.    NO FOOD OR DRINK AFTER MIDNIGHT.  Follow your surgeon's orders for eating and drinking.  You need to have an empty stomach before surgery.  This will make the surgery as safe as possible.  If you don't follow your doctor's orders, your surgery could be changed to another date.  A nurse will call you within a few days of surgery to go over these and other instructions.  If you do not hear from them, please call them at [phone]  The Day of Surgery:    Take a shower or bath the night before and the morning of surgery.  Use antiseptic soap or the soap your surgeon gave you.  Gently clean the skin.  Do not shave or scrub your surgery site.    Please remove deodorant, cologne, scented lotion, makeup, nail polish and jewelry (including rings and body piercings).  If you wear artificial nails, please remove at least one nail before coming to the hospital.    Wear clean, loose clothing to the hospital.    Bring these items to the hospital:  1. Your insurance card.  2. Money for parking and co-pays (for medicines or the surgery), if needed.   3. A list of all the medicines you take.  Include vitamins, minerals, herbs and over-the-counter drugs.  Note any drug allergies.  4. A copy of your advance health care directive, if you have one.  This tells us what treatment you would want -- and who would make health care decisions -- if you could no longer speak for yourself.  You may request this form in advance or download it from [email or web address].  5. A case for any glasses, contact lenses, hearing aids or dentures.  6. Your inhaler or CPAP machine, if you use these at home.  Leave extra cash, jewelry and other valuables at home.    When You Arrive:  When you get to the hospital, you will:    Check in.  If you are under age 18, you must be with a parent or legal guardian.    Sign consent forms, if you haven't already.   These forms state that you know the risks and benefits of surgery.  When you sign the forms, you give us permission to do the surgery.  Do not sign them unless you understand what will happen during and after your surgery.  If you have any questions about your surgery, ask to speak with your doctor before you sign the forms.  If you don't understand the answers, ask again.    Receive a copy of the Patients Bill of Rights.  If you do not receive a copy, please ask for one.    Change into hospital clothes.  Your belongings will be placed in a bag.  We will return them to you after surgery.    Meet with the anesthesia provider.  He or she will tell you what kind of anesthesia (medicine) will be used to keep you comfortable during surgery.  Remember: It's okay to remind doctors and nurses to wash their hands before touching you.   In most cases, your surgeon will use a marker to write his or her initials on the surgery site.  This ensures that the exact site is operated on.  For safety reasons, we will ask you the same questions many times.  For example, we may ask your name and birth date over and over again.  Friends and family can stay with you until it's time for surgery.  While you're in surgery, they will be in the waiting area.  Please note that cell phones are not allowed in some patient care areas.  If you have questions about what will happen in the operating room, talk to your care team.  After Surgery:    We will move you to a recovery room where we will watch you closely.  If you have any pain or discomfort, tell your nurse.  He or she will try to make you comfortable.      If you are staying overnight we will move you to your hospital room after you are awake.    If you are going home we will move you to another room.  Friends and family may be able to join you.  The length of time you spend in recovery depends on the type of medicine you received, your medical condition, and the type of surgery you  "had.  Dealing with pain:  A nurse will check your comfort level often during your stay.  He or she will work with you to manage your pain.  Remember:    All pain is real.  There are many ways to control pain.  We can help you decide what works best for you.    Ask for pain medicine when you need it.  Don't try to \"tough it out\" -- this can make you feel worse.  Always take your medicine as ordered.    Medicine doesn't work the same for everyone.  If your medicine isn't working tell your nurse.  There may be other medicines or treatments we can try.  Going Home:  We will let you know when you're ready to leave the hospital.  Before you leave, we will tell you how to care for yourself at home and prevent infections.  If you do not understand something, please say so.  We will answer any questions you have.  We will then help you get ready to leave.  You must have an adult with you for the first 24 hours after you leave the hospital. Take it easy when you get home.  You will need some time to recover -- you may be more tired than you realize at first.  Rest and relax for at least the first 24 hours at home.  You'll feel better and heal faster if you take good care of yourself.                      Pre-Operative Surgery Scrub    Purpose:   The skin harbors a large variety of bacteria, both infectious and noninfectious.  Showering with an antiseptic soap prior to an invasive procedure will decrease the number of transient and resident (good and bad) bacteria that is normally found on the skin.    Procedure:      Shower or bathe with 1/2 of the bottle of antiseptic soap (enclosed) the evening before and 1/2 of the bottle the morning of surgery (bathing the day of the procedure is most important).       Apply the soap at full strength (use the entire bottle).  Gently clean the skin, rinse, and dry with a clean towel that is freshly laundered (out of the dryer) and then put on clean clothing that is freshly laundered.  "       We have given you information regarding pre-op showering.  We recommend that patients wash with an antiseptic soap prior to surgery.  This cleanser will be given to you at the clinic or mailed to your home.  It is advised that you liberally wash the specific area surgery area the night before, and again in the morning before the surgery.  Do not apply lotion afterward.  We would like to keep the skin as clean as possible.    Thank you for following these important instructions.      You have been scheduled for surgery and we would like to give you some information that will assist in helping get the best possible outcome.      Before Surgery:   If for any reason you decide not to have the surgery, please contact our office.  We can easily cancel or reschedule the procedure. Please call the  at [phone].      Any pain related to the surgery that occurs before the surgery needs to be reported and managed by your primary care or referring doctor.      Please keep in mind that the time of surgery is subject to change.  Make sure you have nothing to eat or drink after midnight.  If your surgery is later in the afternoon, this recommendation might change, but not until the day before surgery after the actual time of the surgery has been established.    After Surgery:  When you are discharged from the recovery room, the nurses will review instructions with you and your caregiver.      Please wash your hands every time you touch the wound or change bandages or dressings.      Do not submerge the wound in water.  You may not use a bathtub or hot tub until the wound is closed.  The wait time frame is generally 2-3 weeks but any open area can be a source of incoming bacteria, so it is better to be on the safe side and avoid the tub until your wound is fully healed.      You may take a shower 24 hours after surgery.  Double check with your surgeon if it is ok for water to run over a wound, whether it has been  sutured, stapled, glued or is open.  You may gently wash the wound using the antiseptic soap provided for your pre-surgery showering (do not use a washcloth).  Any mild soap will work as well.      Many surgical wounds will have small white strips of tape on them called Steri Strips.  Do not remove these.  The edges will curl and fall off within 7-10 days with normal showering.      If you are going home with sutures (stitches) or staples, you must return to the clinic to have them taken out, usually within 1-2 weeks.      Signs and symptoms of infection include:  1. Fever, temperature over 101.5 ' F  2. Redness  3. Swelling  4. Increasing pain  5. Green or yellow drainage which may or may not have a foul odor.    Symptoms of infection need to be reported to your surgery office. Please call the nurse at [phone].    If you or your  are deaf or hard of hearing, or prefer a language other than English, please let us know.  We have many free services, including interpreters and other aids to help you communicate. You may ask for help  through any member of your care team or by calling Language Services at [phone], option 2.

## 2018-09-06 NOTE — NURSING NOTE
Chief Complaint   Patient presents with     Follow Up For     Amos is returning to discuss non healing wound.       Vitals:    09/06/18 1308   BP: 162/71   Pulse: 87   SpO2: 99%       There is no height or weight on file to calculate BMI.

## 2018-09-11 ENCOUNTER — MEDICAL CORRESPONDENCE (OUTPATIENT)
Dept: HEALTH INFORMATION MANAGEMENT | Facility: CLINIC | Age: 73
End: 2018-09-11

## 2018-09-11 ENCOUNTER — OFFICE VISIT (OUTPATIENT)
Dept: PODIATRY | Facility: CLINIC | Age: 73
End: 2018-09-11
Payer: MEDICARE

## 2018-09-11 VITALS
SYSTOLIC BLOOD PRESSURE: 110 MMHG | OXYGEN SATURATION: 99 % | DIASTOLIC BLOOD PRESSURE: 54 MMHG | HEART RATE: 88 BPM | TEMPERATURE: 97.9 F

## 2018-09-11 DIAGNOSIS — L97.512 SKIN ULCER OF RIGHT FOOT WITH FAT LAYER EXPOSED (H): Primary | ICD-10-CM

## 2018-09-11 DIAGNOSIS — E11.49 TYPE II OR UNSPECIFIED TYPE DIABETES MELLITUS WITH NEUROLOGICAL MANIFESTATIONS, NOT STATED AS UNCONTROLLED(250.60) (H): ICD-10-CM

## 2018-09-11 DIAGNOSIS — E11.51 DIABETES MELLITUS WITH PERIPHERAL VASCULAR DISEASE (H): ICD-10-CM

## 2018-09-11 DIAGNOSIS — L97.912 ULCER OF RIGHT LOWER EXTREMITY WITH FAT LAYER EXPOSED (H): ICD-10-CM

## 2018-09-11 PROCEDURE — 99213 OFFICE O/P EST LOW 20 MIN: CPT | Performed by: PODIATRIST
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
## 2018-09-11 NOTE — NURSING NOTE
Amos Walker's chief complaint for this visit includes:  Chief Complaint   Patient presents with     RECHECK     right leg ulcer     PCP: Racheal Swift    Referring Provider:  No referring provider defined for this encounter.    /54  Pulse 88  Temp 97.9  F (36.6  C) (Oral)  SpO2 99%  Data Unavailable     Do you need any medication refills at today's visit? no
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
## 2018-09-11 NOTE — MR AVS SNAPSHOT
After Visit Summary   2018    Amos Walker    MRN: [id number]           Patient Information     Date Of Birth          1945        Visit Information        Provider Department      2018 9:30 AM Brayan Mcclain DPM [facility]        Today's Diagnoses     Skin ulcer of right foot with fat layer exposed (H)    -  1    Ulcer of right lower extremity with fat layer exposed (H)        Type II or unspecified type diabetes mellitus with neurological manifestations, not stated as uncontrolled(250.60) (H)        Diabetes mellitus with peripheral vascular disease (H)          Care Instructions    Thanks for coming today.  Ortho[facility]Sports Medicine Clinic  [address] [place] [address]    To schedule future appointments in Ortho Clinic, you may call [phone].    To schedule ordered imaging by your provider:   Call Central Imaging [phone]    To schedule an injection ordered by your provider:  Call Central Imaging Injection scheduling line: [phone]  Foap AB available online at:  [email or web address]    Please call if any further questions or concerns ([phone]).  Clinic hours 8 am to 5 pm.    Return to clinic (call) if symptoms worsen or fail to improve.            Follow-ups after your visit        Your next 10 appointments already scheduled     Sep 14, 2018   Procedure with Augusto Maharaj MD   [facility] [place], Same Day Surgery (--)    [address] [facility] [address] [address] [phone]            Sep 18, 2018  9:30 AM CDT   Return Visit with Brayan Mcclain DPM   [facility]    [address] [place] [address] [phone]            Sep 19, 2018   Procedure with Rickie Gautam MD   [facility] [place], Endoscopy ([facility])    [address] [address] [phone]           The [facility] is  located on the corner of [facility] [facility] and [facility] on the [facility]. It is easily accessible from virtually any point in the [facility] area, via [address] and [address].            Sep 25, 2018  2:30 PM CDT   Return Visit with Brayan Mcclain DPM   [facility] ([facility])    [address] [place] [address]   [phone]            Oct 02, 2018  3:00 PM CDT   Return Visit with Brayan Mcclain DPM   [facility] ([facility])    [address] [place] [address]   [phone]            Oct 09, 2018  3:00 PM CDT   Return Visit with Brayan Mcclain DPM   [facility] ([facility])    [address] [place] [address]   [phone]            Oct 16, 2018  2:30 PM CDT   Return Visit with Brayan Mcclain DPM   [facility] ([facility])    [address] [place] [address] [phone]            Oct 23, 2018 12:30 PM CDT   Return Visit with Brayan Mcclain DPM   [facility] ([facility])    [address] [place] [address]   [phone]            Nov 05, 2018  1:40 PM CST   (Arrive by 1:25 PM)   Return Visit with Racheal Swift MD   [facility] ([facility])    [address] [place] [address]   [phone]            Nov 06, 2018  3:00 PM CST   Return Visit with Brayan Mcclain DPM   [facility] ([facility])    [address] [place] [address] [phone]              Who to contact     If you have questions or need follow up information about today's clinic visit or your schedule please contact [facility] directly at [phone].  Normal or non-critical lab and imaging results will be communicated  to you by Neuralievehart, letter or phone within 4 business days after the clinic has received the results. If you do not hear from us within 7 days, please contact the clinic through Stack [employer] or phone. If you have a critical or abnormal lab result, we will notify you by phone as soon as possible.  Submit refill requests through Stack Exchange or call your pharmacy and they will forward the refill request to us. Please allow 3 business days for your refill to be completed.          Additional Information About Your Visit        Stack Exchange Information     Stack Exchange gives you secure access to your electronic health record. If you see a primary care provider, you can also send messages to your care team and make appointments. If you have questions, please call your primary care clinic.  If you do not have a primary care provider, please call [phone] and they will assist you.      Stack Exchange is an electronic gateway that provides easy, online access to your medical records. With Stack Exchange, you can request a clinic appointment, read your test results, renew a prescription or communicate with your care team.     To access your existing account, please contact your [facility] or call [phone] for assistance.        Care EveryWhere ID     This is your Care EveryWhere ID. This could be used by other organizations to access your [place] medical records  [id number]        Your Vitals Were     Pulse Temperature Pulse Oximetry             88 97.9  F (36.6  C) (Oral) 99%          Blood Pressure from Last 3 Encounters:   09/11/18 110/54   09/06/18 160/78   09/04/18 127/62    Weight from Last 3 Encounters:   09/04/18 75.9 kg (167 lb 4.8 oz)   06/29/18 76.7 kg (169 lb 1.6 oz)   06/19/18 76.7 kg (169 lb)              Today, you had the following     No orders found for display         Today's Medication Changes          These changes are accurate as of 9/11/18  9:36 AM.  If you have any questions, ask your nurse or  doctor.               These medicines have changed or have updated prescriptions.        Dose/Directions    * clopidogrel 75 MG tablet   Commonly known as:  PLAVIX   This may have changed:  when to take this   Used for:  Peripheral vascular disease, unspecified        Dose:  75 mg   Take 1 tablet (75 mg) by mouth daily   Quantity:  30 tablet   Refills:  11       * clopidogrel 75 MG tablet   Commonly known as:  PLAVIX   This may have changed:  Another medication with the same name was changed. Make sure you understand how and when to take each.   Used for:  Peripheral vascular disease (H)        Dose:  75 mg   Take 1 tablet (75 mg) by mouth daily   Quantity:  90 tablet   Refills:  0       gentamicin 0.1 % cream   Commonly known as:  GARAMYCIN   This may have changed:    - when to take this  - reasons to take this  - additional instructions   Used for:  Ulcer of right lower leg, with fat layer exposed (H), Chronic venous hypertension with ulcer involving right side (H), Type 2 diabetes, controlled, with neuropathy (H)        Apply topically daily To right leg ulcer.   Quantity:  30 g   Refills:  5       * Notice:  This list has 2 medication(s) that are the same as other medications prescribed for you. Read the directions carefully, and ask your doctor or other care provider to review them with you.             Primary Care Provider Office Phone # Fax #    Racheal Swift [phone]       [address] [address]        Equal Access to Services     SAMSON MELTON : Nataliia Bedoya, waaxda luqadaha, qaybta kaalmada adejessee, yolanda lopez. So [facility] [phone].    ATENCIÓN: Si habla español, tiene a rodrigues disposición servicios gratuitos de asistencia lingüística. Llame al [phone].    We comply with applicable federal civil rights laws and [place] laws. We do not discriminate on the basis of race, color, national origin, age,  disability, sex, sexual orientation, or gender identity.            Thank you!     Thank you for choosing [facility]  for your care. Our goal is always to provide you with excellent care. Hearing back from our patients is one way we can continue to improve our services. Please take a few minutes to complete the written survey that you may receive in the mail after your visit with us. Thank you!             Your Updated Medication List - Protect others around you: Learn how to safely use, store and throw away your medicines at www.disposemymeds.org.          This list is accurate as of 9/11/18  9:36 AM.  Always use your most recent med list.                   Brand Name Dispense Instructions for use Diagnosis    ammonium lactate 12 % cream    LAC-HYDRIN    385 g    Apply topically 2 times daily as needed for dry skin    Venous stasis, Type 2 diabetes, controlled, with neuropathy (H)       ascorbic acid 500 MG Tabs     30 tablet    Take 1 tablet (500 mg) by mouth 2 times daily    Ulcer of right lower leg, with fat layer exposed (H)       ASPIRIN PO      Take 81 mg by mouth daily        blood glucose monitoring lancets     3 Box    Use to test blood sugars 2 as directed.    Type 2 diabetes, uncontrolled, with neuropathy (H)       Blood Pressure Kit     1 kit    1 Device daily    Benign essential hypertension       cefadroxil 500 MG capsule    DURICEF    20 capsule    Take 1 capsule (500 mg) by mouth 2 times daily    Skin ulcer of left foot, limited to breakdown of skin (H), Type II or unspecified type diabetes mellitus with neurological manifestations, not stated as uncontrolled(250.60) (H), Diabetes mellitus with peripheral vascular disease (H)       * clopidogrel 75 MG tablet    PLAVIX    30 tablet    Take 1 tablet (75 mg) by mouth daily    Peripheral vascular disease, unspecified       * clopidogrel 75 MG tablet    PLAVIX    90 tablet    Take 1 tablet (75 mg) by mouth daily    Peripheral vascular  "disease (H)       ferrous sulfate 325 (65 Fe) MG tablet    IRON    60 tablet    Take 1 tablet (325 mg) by mouth 2 times daily    Peripheral vascular disease, unspecified       gentamicin 0.1 % cream    GARAMYCIN    30 g    Apply topically daily To right leg ulcer.    Ulcer of right lower leg, with fat layer exposed (H), Chronic venous hypertension with ulcer involving right side (H), Type 2 diabetes, controlled, with neuropathy (H)       insulin glargine 100 UNIT/ML injection    LANTUS SOLOSTAR    3 mL    Inject 16 Units Subcutaneous daily (with dinner) May take up to 22 units daily    Type 2 diabetes mellitus with diabetic peripheral angiopathy without gangrene, with long-term current use of insulin (H)       insulin pen needle 31G X 8 MM    B-D U/F    100 each    Use 1 daily as directed    Diabetes mellitus, type II (H)       lisinopril 10 MG tablet    PRINIVIL/ZESTRIL    90 tablet    Take 1 tablet (10 mg) by mouth daily    Benign essential hypertension       * nateglinide 120 MG tablet    STARLIX    90 tablet    TAKE 1 TABLET BY MOUTH THREE TIMES DAILY BEFORE MEALS    Type 2 diabetes, controlled, with neuropathy (H)       * nateglinide 120 MG tablet    STARLIX    90 tablet    Take 1 tablet (120 mg) by mouth 3 times daily (before meals)    Diabetes mellitus (H)       ONETOUCH ULTRA test strip   Generic drug:  blood glucose monitoring     200 strip    USE TO TEST TWICE DAILY OR AS DIRECTED    Type 2 diabetes mellitus (H)       OPTIFOAM 6\"X6\" Pads     1 each    1 Box once a week    Ulcer of right leg, with fat layer exposed (H)       order for DME     2 each    Please measure and distribute 1 pair of 20mm Hg - 30mm Hg knee high ULCER CARE open or closed toe compression stockings.  Patient has a size 13 foot and please take this into consideration.  Jobst or equivalent    Varicose veins of lower extremities with other complications, Venous stasis ulcer of right lower extremity (H)       order for DME     3 each    " Please measure and distribute 1 pair of 20mmHg - 30mmHg knee high open or closed toe compression stockings. Jobst ultrasheer or equivalent.    Varicose veins of both lower extremities with complications       order for DME     2 each    Please measure and distribute 1 pair of 30mmHg - 40mmHg knee high open toe ulcercare compression stockings. Jobst ultrasheer or equivalent.    Varicose veins of bilateral lower extremities with other complications       sildenafil 50 MG tablet    VIAGRA    10 tablet    Take 1 tablet (50 mg) by mouth daily as needed for erectile dysfunction    Vasculogenic erectile dysfunction, unspecified vasculogenic erectile dysfunction type       silver sulfADIAZINE 1 % cream    SILVADENE    85 g    Apply topically daily To affected areas on right foot and leg.    Ulcer of right lower leg, with fat layer exposed (H), Chronic venous hypertension with ulcer involving right side (H), Type 2 diabetes, controlled, with neuropathy (H)       simvastatin 10 MG tablet    ZOCOR    90 tablet    Take 1 tablet (10 mg) by mouth At Bedtime    Type 2 diabetes, controlled, with neuropathy (H)       triamcinolone 0.1 % cream    KENALOG    60 g    Apply sparingly to left heel daily.    Dermatitis of left foot       VITAMIN C PO      Take 1,000 mg by mouth        VITAMIN D (CHOLECALCIFEROL) PO      Take 1,000 Units by mouth 2 times daily        * Notice:  This list has 4 medication(s) that are the same as other medications prescribed for you. Read the directions carefully, and ask your doctor or other care provider to review them with you.

## 2018-09-11 NOTE — PATIENT INSTRUCTIONS
Thanks for coming today.  Ortho/Sports Medicine Clinic  [address] [place] [address]    To schedule future appointments in Ortho Clinic, you may call [phone].    To schedule ordered imaging by your provider:   Call Central Imaging [phone]    To schedule an injection ordered by your provider:  Call Central Imaging Injection scheduling line: [phone]  [employer] Radio[employer] available online at:  [email or web address]    Please call if any further questions or concerns ([phone]).  Clinic hours 8 am to 5 pm.    Return to clinic (call) if symptoms worsen or fail to improve.

## 2018-09-13 ENCOUNTER — TELEPHONE (OUTPATIENT)
Dept: GASTROENTEROLOGY | Facility: CLINIC | Age: 73
End: 2018-09-13

## 2018-09-13 ENCOUNTER — ANESTHESIA EVENT (OUTPATIENT)
Dept: SURGERY | Facility: CLINIC | Age: 73
DRG: 254 | End: 2018-09-13
Payer: MEDICARE

## 2018-09-13 DIAGNOSIS — I70.229 CRITICAL LOWER LIMB ISCHEMIA (H): Primary | ICD-10-CM

## 2018-09-14 ENCOUNTER — HOSPITAL ENCOUNTER (INPATIENT)
Facility: CLINIC | Age: 73
LOS: 1 days | Discharge: HOME | DRG: 254 | End: 2018-09-15
Attending: SURGERY | Admitting: SURGERY
Payer: MEDICARE

## 2018-09-14 ENCOUNTER — ANESTHESIA (OUTPATIENT)
Dept: SURGERY | Facility: CLINIC | Age: 73
DRG: 254 | End: 2018-09-14
Payer: MEDICARE

## 2018-09-14 ENCOUNTER — APPOINTMENT (OUTPATIENT)
Dept: INTERVENTIONAL RADIOLOGY/VASCULAR | Facility: CLINIC | Age: 73
DRG: 254 | End: 2018-09-14
Attending: SURGERY
Payer: MEDICARE

## 2018-09-14 DIAGNOSIS — G89.18 ACUTE POST-OPERATIVE PAIN: Primary | ICD-10-CM

## 2018-09-14 PROBLEM — I70.229 CRITICAL LOWER LIMB ISCHEMIA (H): Status: ACTIVE | Noted: 2018-09-14

## 2018-09-14 LAB
ABO + RH BLD: NORMAL
ABO + RH BLD: NORMAL
BASOPHILS # BLD AUTO: 0.1 10E9/L (ref 0–0.2)
BASOPHILS NFR BLD AUTO: 1.2 %
BLD GP AB SCN SERPL QL: NORMAL
BLOOD BANK CMNT PATIENT-IMP: NORMAL
CREAT SERPL-MCNC: 1.13 MG/DL (ref 0.66–1.25)
DIFFERENTIAL METHOD BLD: ABNORMAL
EOSINOPHIL # BLD AUTO: 0.2 10E9/L (ref 0–0.7)
EOSINOPHIL NFR BLD AUTO: 3.3 %
ERYTHROCYTE [DISTWIDTH] IN BLOOD BY AUTOMATED COUNT: 14.2 % (ref 10–15)
GFR SERPL CREATININE-BSD FRML MDRD: 64 ML/MIN/1.7M2
GLUCOSE BLDC GLUCOMTR-MCNC: 101 MG/DL (ref 70–99)
GLUCOSE BLDC GLUCOMTR-MCNC: 109 MG/DL (ref 70–99)
GLUCOSE BLDC GLUCOMTR-MCNC: 120 MG/DL (ref 70–99)
GLUCOSE BLDC GLUCOMTR-MCNC: 133 MG/DL (ref 70–99)
GLUCOSE BLDC GLUCOMTR-MCNC: 65 MG/DL (ref 70–99)
GLUCOSE BLDC GLUCOMTR-MCNC: 91 MG/DL (ref 70–99)
GLUCOSE BLDC GLUCOMTR-MCNC: 94 MG/DL (ref 70–99)
HCT VFR BLD AUTO: 33.3 % (ref 40–53)
HGB BLD-MCNC: 10.6 G/DL (ref 13.3–17.7)
HGB BLD-MCNC: 8.2 G/DL (ref 13.3–17.7)
IMM GRANULOCYTES # BLD: 0 10E9/L (ref 0–0.4)
IMM GRANULOCYTES NFR BLD: 0.7 %
INTERPRETATION ECG - MUSE: NORMAL
LYMPHOCYTES # BLD AUTO: 1.6 10E9/L (ref 0.8–5.3)
LYMPHOCYTES NFR BLD AUTO: 26.7 %
MCH RBC QN AUTO: 30.8 PG (ref 26.5–33)
MCHC RBC AUTO-ENTMCNC: 31.8 G/DL (ref 31.5–36.5)
MCV RBC AUTO: 97 FL (ref 78–100)
MONOCYTES # BLD AUTO: 0.5 10E9/L (ref 0–1.3)
MONOCYTES NFR BLD AUTO: 8.1 %
NEUTROPHILS # BLD AUTO: 3.6 10E9/L (ref 1.6–8.3)
NEUTROPHILS NFR BLD AUTO: 60 %
NRBC # BLD AUTO: 0 10*3/UL
NRBC BLD AUTO-RTO: 0 /100
PLATELET # BLD AUTO: 207 10E9/L (ref 150–450)
POTASSIUM SERPL-SCNC: 4.5 MMOL/L (ref 3.4–5.3)
RBC # BLD AUTO: 3.44 10E12/L (ref 4.4–5.9)
SPECIMEN EXP DATE BLD: NORMAL
WBC # BLD AUTO: 6 10E9/L (ref 4–11)

## 2018-09-14 PROCEDURE — C1887 CATHETER, GUIDING: HCPCS | Performed by: SURGERY

## 2018-09-14 PROCEDURE — 84132 ASSAY OF SERUM POTASSIUM: CPT | Performed by: CLINICAL NURSE SPECIALIST

## 2018-09-14 PROCEDURE — G0378 HOSPITAL OBSERVATION PER HR: HCPCS

## 2018-09-14 PROCEDURE — 85025 COMPLETE CBC W/AUTO DIFF WBC: CPT | Performed by: CLINICAL NURSE SPECIALIST

## 2018-09-14 PROCEDURE — 25000128 H RX IP 250 OP 636: Performed by: NURSE ANESTHETIST, CERTIFIED REGISTERED

## 2018-09-14 PROCEDURE — 36000066 ZZH SURGERY LEVEL 4 W FLUORO 1ST 30 MIN - UMMC: Performed by: SURGERY

## 2018-09-14 PROCEDURE — 36415 COLL VENOUS BLD VENIPUNCTURE: CPT | Performed by: CLINICAL NURSE SPECIALIST

## 2018-09-14 PROCEDURE — 40000014 ZZH STATISTIC ARTERIAL MONITORING DAILY

## 2018-09-14 PROCEDURE — 25000565 ZZH ISOFLURANE, EA 15 MIN: Performed by: SURGERY

## 2018-09-14 PROCEDURE — 40000003 ZZH STATISTIC IR STAFF TIME IN THE OR

## 2018-09-14 PROCEDURE — C1769 GUIDE WIRE: HCPCS | Performed by: SURGERY

## 2018-09-14 PROCEDURE — 00000146 ZZHCL STATISTIC GLUCOSE BY METER IP

## 2018-09-14 PROCEDURE — 25000128 H RX IP 250 OP 636: Performed by: SURGERY

## 2018-09-14 PROCEDURE — 25000128 H RX IP 250 OP 636

## 2018-09-14 PROCEDURE — 88304 TISSUE EXAM BY PATHOLOGIST: CPT | Performed by: SURGERY

## 2018-09-14 PROCEDURE — 25000125 ZZHC RX 250: Performed by: NURSE ANESTHETIST, CERTIFIED REGISTERED

## 2018-09-14 PROCEDURE — 37000008 ZZH ANESTHESIA TECHNICAL FEE, 1ST 30 MIN: Performed by: SURGERY

## 2018-09-14 PROCEDURE — 27210794 ZZH OR GENERAL SUPPLY STERILE: Performed by: SURGERY

## 2018-09-14 PROCEDURE — 25000128 H RX IP 250 OP 636: Performed by: CLINICAL NURSE SPECIALIST

## 2018-09-14 PROCEDURE — 93005 ELECTROCARDIOGRAM TRACING: CPT

## 2018-09-14 PROCEDURE — 047M0ZZ DILATION OF RIGHT POPLITEAL ARTERY, OPEN APPROACH: ICD-10-PCS | Performed by: SURGERY

## 2018-09-14 PROCEDURE — A9270 NON-COVERED ITEM OR SERVICE: HCPCS | Mod: GY

## 2018-09-14 PROCEDURE — 36000064 ZZH SURGERY LEVEL 4 EA 15 ADDTL MIN - UMMC: Performed by: SURGERY

## 2018-09-14 PROCEDURE — C1894 INTRO/SHEATH, NON-LASER: HCPCS | Performed by: SURGERY

## 2018-09-14 PROCEDURE — 12000001 ZZH R&B MED SURG/OB UMMC

## 2018-09-14 PROCEDURE — 04CK0ZZ EXTIRPATION OF MATTER FROM RIGHT FEMORAL ARTERY, OPEN APPROACH: ICD-10-PCS | Performed by: SURGERY

## 2018-09-14 PROCEDURE — 71000017 ZZH RECOVERY PHASE 1 LEVEL 3 EA ADDTL HR: Performed by: SURGERY

## 2018-09-14 PROCEDURE — 27810169 ZZH OR IMPLANT GENERAL: Performed by: SURGERY

## 2018-09-14 PROCEDURE — 25000132 ZZH RX MED GY IP 250 OP 250 PS 637: Mod: GY

## 2018-09-14 PROCEDURE — 25000128 H RX IP 250 OP 636: Performed by: ANESTHESIOLOGY

## 2018-09-14 PROCEDURE — 82565 ASSAY OF CREATININE: CPT | Performed by: CLINICAL NURSE SPECIALIST

## 2018-09-14 PROCEDURE — 93010 ELECTROCARDIOGRAM REPORT: CPT | Mod: 76 | Performed by: INTERNAL MEDICINE

## 2018-09-14 PROCEDURE — 047K0DZ DILATION OF RIGHT FEMORAL ARTERY WITH INTRALUMINAL DEVICE, OPEN APPROACH: ICD-10-PCS | Performed by: SURGERY

## 2018-09-14 PROCEDURE — C1725 CATH, TRANSLUMIN NON-LASER: HCPCS | Performed by: SURGERY

## 2018-09-14 PROCEDURE — 25000131 ZZH RX MED GY IP 250 OP 636 PS 637: Mod: GY

## 2018-09-14 PROCEDURE — 96372 THER/PROPH/DIAG INJ SC/IM: CPT

## 2018-09-14 PROCEDURE — 04UK0KZ SUPPLEMENT RIGHT FEMORAL ARTERY WITH NONAUTOLOGOUS TISSUE SUBSTITUTE, OPEN APPROACH: ICD-10-PCS | Performed by: SURGERY

## 2018-09-14 PROCEDURE — 88311 DECALCIFY TISSUE: CPT | Performed by: SURGERY

## 2018-09-14 PROCEDURE — 37000009 ZZH ANESTHESIA TECHNICAL FEE, EACH ADDTL 15 MIN: Performed by: SURGERY

## 2018-09-14 PROCEDURE — C1763 CONN TISS, NON-HUMAN: HCPCS | Performed by: SURGERY

## 2018-09-14 PROCEDURE — 27211024 ZZHC OR SUPPLY OTHER OPNP: Performed by: SURGERY

## 2018-09-14 PROCEDURE — 85018 HEMOGLOBIN: CPT | Performed by: ANESTHESIOLOGY

## 2018-09-14 PROCEDURE — 40000275 ZZH STATISTIC RCP TIME EA 10 MIN

## 2018-09-14 PROCEDURE — 71000016 ZZH RECOVERY PHASE 1 LEVEL 3 FIRST HR: Performed by: SURGERY

## 2018-09-14 PROCEDURE — 40000170 ZZH STATISTIC PRE-PROCEDURE ASSESSMENT II: Performed by: SURGERY

## 2018-09-14 DEVICE — GRAFT PATCH VASC XENOSURE BIOLOGIC 0.8X08CM E0.8P8: Type: IMPLANTABLE DEVICE | Site: ILIAC/FEMORALS | Status: FUNCTIONAL

## 2018-09-14 RX ORDER — ONDANSETRON 4 MG/1
4 TABLET, ORALLY DISINTEGRATING ORAL EVERY 30 MIN PRN
Status: DISCONTINUED | OUTPATIENT
Start: 2018-09-14 | End: 2018-09-14 | Stop reason: HOSPADM

## 2018-09-14 RX ORDER — EPHEDRINE SULFATE 50 MG/ML
INJECTION, SOLUTION INTRAMUSCULAR; INTRAVENOUS; SUBCUTANEOUS PRN
Status: DISCONTINUED | OUTPATIENT
Start: 2018-09-14 | End: 2018-09-14

## 2018-09-14 RX ORDER — ONDANSETRON 2 MG/ML
4 INJECTION INTRAMUSCULAR; INTRAVENOUS EVERY 30 MIN PRN
Status: DISCONTINUED | OUTPATIENT
Start: 2018-09-14 | End: 2018-09-14 | Stop reason: HOSPADM

## 2018-09-14 RX ORDER — LIDOCAINE 40 MG/G
CREAM TOPICAL
Status: DISCONTINUED | OUTPATIENT
Start: 2018-09-14 | End: 2018-09-14 | Stop reason: HOSPADM

## 2018-09-14 RX ORDER — HYDROMORPHONE HYDROCHLORIDE 1 MG/ML
.3-.5 INJECTION, SOLUTION INTRAMUSCULAR; INTRAVENOUS; SUBCUTANEOUS EVERY 5 MIN PRN
Status: DISCONTINUED | OUTPATIENT
Start: 2018-09-14 | End: 2018-09-14 | Stop reason: HOSPADM

## 2018-09-14 RX ORDER — SIMVASTATIN 10 MG
10 TABLET ORAL AT BEDTIME
Status: DISCONTINUED | OUTPATIENT
Start: 2018-09-14 | End: 2018-09-15 | Stop reason: HOSPADM

## 2018-09-14 RX ORDER — CEFAZOLIN SODIUM 1 G/3ML
1 INJECTION, POWDER, FOR SOLUTION INTRAMUSCULAR; INTRAVENOUS EVERY 8 HOURS
Status: COMPLETED | OUTPATIENT
Start: 2018-09-14 | End: 2018-09-15

## 2018-09-14 RX ORDER — LABETALOL HYDROCHLORIDE 5 MG/ML
INJECTION, SOLUTION INTRAVENOUS PRN
Status: DISCONTINUED | OUTPATIENT
Start: 2018-09-14 | End: 2018-09-14

## 2018-09-14 RX ORDER — SODIUM CHLORIDE, SODIUM LACTATE, POTASSIUM CHLORIDE, CALCIUM CHLORIDE 600; 310; 30; 20 MG/100ML; MG/100ML; MG/100ML; MG/100ML
INJECTION, SOLUTION INTRAVENOUS CONTINUOUS
Status: DISCONTINUED | OUTPATIENT
Start: 2018-09-14 | End: 2018-09-15

## 2018-09-14 RX ORDER — BUPIVACAINE HYDROCHLORIDE 5 MG/ML
INJECTION, SOLUTION PERINEURAL PRN
Status: DISCONTINUED | OUTPATIENT
Start: 2018-09-14 | End: 2018-09-14

## 2018-09-14 RX ORDER — LISINOPRIL 10 MG/1
10 TABLET ORAL EVERY EVENING
Status: DISCONTINUED | OUTPATIENT
Start: 2018-09-14 | End: 2018-09-14

## 2018-09-14 RX ORDER — NICOTINE POLACRILEX 4 MG
15-30 LOZENGE BUCCAL
Status: DISCONTINUED | OUTPATIENT
Start: 2018-09-14 | End: 2018-09-15 | Stop reason: HOSPADM

## 2018-09-14 RX ORDER — CLOPIDOGREL BISULFATE 75 MG/1
75 TABLET ORAL EVERY EVENING
Status: DISCONTINUED | OUTPATIENT
Start: 2018-09-14 | End: 2018-09-15 | Stop reason: HOSPADM

## 2018-09-14 RX ORDER — NATEGLINIDE 60 MG/1
120 TABLET ORAL
Status: DISCONTINUED | OUTPATIENT
Start: 2018-09-14 | End: 2018-09-15 | Stop reason: HOSPADM

## 2018-09-14 RX ORDER — PROPOFOL 10 MG/ML
INJECTION, EMULSION INTRAVENOUS PRN
Status: DISCONTINUED | OUTPATIENT
Start: 2018-09-14 | End: 2018-09-14

## 2018-09-14 RX ORDER — TRIAMCINOLONE ACETONIDE 1 MG/G
CREAM TOPICAL DAILY
Status: DISCONTINUED | OUTPATIENT
Start: 2018-09-14 | End: 2018-09-15 | Stop reason: HOSPADM

## 2018-09-14 RX ORDER — ASPIRIN 81 MG/1
81 TABLET, CHEWABLE ORAL DAILY
Status: DISCONTINUED | OUTPATIENT
Start: 2018-09-14 | End: 2018-09-15 | Stop reason: HOSPADM

## 2018-09-14 RX ORDER — LABETALOL HYDROCHLORIDE 5 MG/ML
10 INJECTION, SOLUTION INTRAVENOUS
Status: DISCONTINUED | OUTPATIENT
Start: 2018-09-14 | End: 2018-09-14 | Stop reason: HOSPADM

## 2018-09-14 RX ORDER — IOPAMIDOL 755 MG/ML
INJECTION, SOLUTION INTRAVASCULAR PRN
Status: DISCONTINUED | OUTPATIENT
Start: 2018-09-14 | End: 2018-09-14

## 2018-09-14 RX ORDER — CEFAZOLIN SODIUM 1 G/3ML
INJECTION, POWDER, FOR SOLUTION INTRAMUSCULAR; INTRAVENOUS PRN
Status: DISCONTINUED | OUTPATIENT
Start: 2018-09-14 | End: 2018-09-14

## 2018-09-14 RX ORDER — FENTANYL CITRATE 50 UG/ML
INJECTION, SOLUTION INTRAMUSCULAR; INTRAVENOUS PRN
Status: DISCONTINUED | OUTPATIENT
Start: 2018-09-14 | End: 2018-09-14

## 2018-09-14 RX ORDER — PROTAMINE SULFATE 10 MG/ML
INJECTION, SOLUTION INTRAVENOUS PRN
Status: DISCONTINUED | OUTPATIENT
Start: 2018-09-14 | End: 2018-09-14

## 2018-09-14 RX ORDER — FENTANYL CITRATE 50 UG/ML
25-50 INJECTION, SOLUTION INTRAMUSCULAR; INTRAVENOUS
Status: DISCONTINUED | OUTPATIENT
Start: 2018-09-14 | End: 2018-09-14 | Stop reason: HOSPADM

## 2018-09-14 RX ORDER — NALOXONE HYDROCHLORIDE 0.4 MG/ML
.1-.4 INJECTION, SOLUTION INTRAMUSCULAR; INTRAVENOUS; SUBCUTANEOUS
Status: DISCONTINUED | OUTPATIENT
Start: 2018-09-14 | End: 2018-09-14

## 2018-09-14 RX ORDER — NALOXONE HYDROCHLORIDE 0.4 MG/ML
.1-.4 INJECTION, SOLUTION INTRAMUSCULAR; INTRAVENOUS; SUBCUTANEOUS
Status: ACTIVE | OUTPATIENT
Start: 2018-09-14 | End: 2018-09-15

## 2018-09-14 RX ORDER — DEXTROSE MONOHYDRATE 25 G/50ML
25-50 INJECTION, SOLUTION INTRAVENOUS
Status: DISCONTINUED | OUTPATIENT
Start: 2018-09-14 | End: 2018-09-15 | Stop reason: HOSPADM

## 2018-09-14 RX ORDER — HYDRALAZINE HYDROCHLORIDE 20 MG/ML
2.5-5 INJECTION INTRAMUSCULAR; INTRAVENOUS EVERY 10 MIN PRN
Status: DISCONTINUED | OUTPATIENT
Start: 2018-09-14 | End: 2018-09-14 | Stop reason: HOSPADM

## 2018-09-14 RX ORDER — SODIUM CHLORIDE 9 MG/ML
INJECTION, SOLUTION INTRAVENOUS CONTINUOUS
Status: DISCONTINUED | OUTPATIENT
Start: 2018-09-14 | End: 2018-09-14

## 2018-09-14 RX ORDER — CEFAZOLIN SODIUM 2 G/100ML
2 INJECTION, SOLUTION INTRAVENOUS
Status: COMPLETED | OUTPATIENT
Start: 2018-09-14 | End: 2018-09-14

## 2018-09-14 RX ORDER — NALOXONE HYDROCHLORIDE 0.4 MG/ML
.1-.4 INJECTION, SOLUTION INTRAMUSCULAR; INTRAVENOUS; SUBCUTANEOUS
Status: DISCONTINUED | OUTPATIENT
Start: 2018-09-14 | End: 2018-09-15 | Stop reason: HOSPADM

## 2018-09-14 RX ORDER — SODIUM CHLORIDE 9 MG/ML
INJECTION, SOLUTION INTRAVENOUS CONTINUOUS PRN
Status: DISCONTINUED | OUTPATIENT
Start: 2018-09-14 | End: 2018-09-14

## 2018-09-14 RX ORDER — LIDOCAINE 40 MG/G
CREAM TOPICAL
Status: DISCONTINUED | OUTPATIENT
Start: 2018-09-14 | End: 2018-09-15 | Stop reason: HOSPADM

## 2018-09-14 RX ORDER — SODIUM CHLORIDE, SODIUM LACTATE, POTASSIUM CHLORIDE, CALCIUM CHLORIDE 600; 310; 30; 20 MG/100ML; MG/100ML; MG/100ML; MG/100ML
INJECTION, SOLUTION INTRAVENOUS CONTINUOUS
Status: DISCONTINUED | OUTPATIENT
Start: 2018-09-14 | End: 2018-09-14 | Stop reason: HOSPADM

## 2018-09-14 RX ORDER — GENTAMICIN SULFATE 1 MG/G
CREAM TOPICAL DAILY
Status: DISCONTINUED | OUTPATIENT
Start: 2018-09-14 | End: 2018-09-15 | Stop reason: HOSPADM

## 2018-09-14 RX ORDER — HEPARIN SODIUM 1000 [USP'U]/ML
INJECTION, SOLUTION INTRAVENOUS; SUBCUTANEOUS PRN
Status: DISCONTINUED | OUTPATIENT
Start: 2018-09-14 | End: 2018-09-14

## 2018-09-14 RX ORDER — ACETAMINOPHEN 325 MG/1
650 TABLET ORAL EVERY 6 HOURS
Status: DISCONTINUED | OUTPATIENT
Start: 2018-09-14 | End: 2018-09-15 | Stop reason: HOSPADM

## 2018-09-14 RX ADMIN — CLOPIDOGREL 75 MG: 75 TABLET, FILM COATED ORAL at 20:12

## 2018-09-14 RX ADMIN — PHENYLEPHRINE HYDROCHLORIDE 100 MCG: 10 INJECTION, SOLUTION INTRAMUSCULAR; INTRAVENOUS; SUBCUTANEOUS at 08:22

## 2018-09-14 RX ADMIN — LABETALOL HYDROCHLORIDE 5 MG: 5 INJECTION INTRAVENOUS at 12:04

## 2018-09-14 RX ADMIN — Medication 5 MG: at 08:45

## 2018-09-14 RX ADMIN — PROPOFOL 20 MG: 10 INJECTION, EMULSION INTRAVENOUS at 11:50

## 2018-09-14 RX ADMIN — PHENYLEPHRINE HYDROCHLORIDE 100 MCG: 10 INJECTION, SOLUTION INTRAMUSCULAR; INTRAVENOUS; SUBCUTANEOUS at 08:18

## 2018-09-14 RX ADMIN — SODIUM CHLORIDE: 9 INJECTION, SOLUTION INTRAVENOUS at 12:58

## 2018-09-14 RX ADMIN — LABETALOL HYDROCHLORIDE 5 MG: 5 INJECTION INTRAVENOUS at 11:58

## 2018-09-14 RX ADMIN — HEPARIN SODIUM 3 ML: 1000 INJECTION, SOLUTION INTRAVENOUS; SUBCUTANEOUS at 10:30

## 2018-09-14 RX ADMIN — SODIUM CHLORIDE, POTASSIUM CHLORIDE, SODIUM LACTATE AND CALCIUM CHLORIDE: 600; 310; 30; 20 INJECTION, SOLUTION INTRAVENOUS at 07:50

## 2018-09-14 RX ADMIN — SUGAMMADEX 200 MG: 100 INJECTION, SOLUTION INTRAVENOUS at 11:49

## 2018-09-14 RX ADMIN — MIDAZOLAM 1 MG: 1 INJECTION INTRAMUSCULAR; INTRAVENOUS at 07:50

## 2018-09-14 RX ADMIN — CEFAZOLIN 1 G: 1 INJECTION, POWDER, FOR SOLUTION INTRAMUSCULAR; INTRAVENOUS at 10:20

## 2018-09-14 RX ADMIN — ROCURONIUM BROMIDE 30 MG: 10 INJECTION INTRAVENOUS at 08:40

## 2018-09-14 RX ADMIN — FENTANYL CITRATE 25 MCG: 50 INJECTION, SOLUTION INTRAMUSCULAR; INTRAVENOUS at 11:51

## 2018-09-14 RX ADMIN — ROCURONIUM BROMIDE 50 MG: 10 INJECTION INTRAVENOUS at 08:02

## 2018-09-14 RX ADMIN — PROTAMINE SULFATE 100 MG: 10 INJECTION, SOLUTION INTRAVENOUS at 11:11

## 2018-09-14 RX ADMIN — PHENYLEPHRINE HYDROCHLORIDE 100 MCG: 10 INJECTION, SOLUTION INTRAMUSCULAR; INTRAVENOUS; SUBCUTANEOUS at 08:27

## 2018-09-14 RX ADMIN — SODIUM CHLORIDE 500 ML: 9 INJECTION, SOLUTION INTRAVENOUS at 13:59

## 2018-09-14 RX ADMIN — PHENYLEPHRINE HYDROCHLORIDE 50 MCG: 10 INJECTION, SOLUTION INTRAMUSCULAR; INTRAVENOUS; SUBCUTANEOUS at 08:05

## 2018-09-14 RX ADMIN — ROCURONIUM BROMIDE 20 MG: 10 INJECTION INTRAVENOUS at 09:45

## 2018-09-14 RX ADMIN — ROCURONIUM BROMIDE 20 MG: 10 INJECTION INTRAVENOUS at 10:18

## 2018-09-14 RX ADMIN — SODIUM CHLORIDE 500 ML: 9 INJECTION, SOLUTION INTRAVENOUS at 14:52

## 2018-09-14 RX ADMIN — CEFAZOLIN SODIUM 2 G: 2 INJECTION, SOLUTION INTRAVENOUS at 08:20

## 2018-09-14 RX ADMIN — SODIUM CHLORIDE: 9 INJECTION, SOLUTION INTRAVENOUS at 07:50

## 2018-09-14 RX ADMIN — Medication 5 MG: at 08:53

## 2018-09-14 RX ADMIN — INSULIN GLARGINE 16 UNITS: 100 INJECTION, SOLUTION SUBCUTANEOUS at 18:08

## 2018-09-14 RX ADMIN — FENTANYL CITRATE 25 MCG: 50 INJECTION, SOLUTION INTRAMUSCULAR; INTRAVENOUS at 11:31

## 2018-09-14 RX ADMIN — SODIUM CHLORIDE, POTASSIUM CHLORIDE, SODIUM LACTATE AND CALCIUM CHLORIDE: 600; 310; 30; 20 INJECTION, SOLUTION INTRAVENOUS at 17:13

## 2018-09-14 RX ADMIN — NATEGLINIDE 120 MG: 60 TABLET, COATED ORAL at 18:08

## 2018-09-14 RX ADMIN — PROPOFOL 100 MG: 10 INJECTION, EMULSION INTRAVENOUS at 08:02

## 2018-09-14 RX ADMIN — MIDAZOLAM 1 MG: 1 INJECTION INTRAMUSCULAR; INTRAVENOUS at 07:58

## 2018-09-14 RX ADMIN — CEFAZOLIN SODIUM 1 G: 1 INJECTION, POWDER, FOR SOLUTION INTRAMUSCULAR; INTRAVENOUS at 18:08

## 2018-09-14 RX ADMIN — PHENYLEPHRINE HYDROCHLORIDE 0.3 MCG/KG/MIN: 10 INJECTION, SOLUTION INTRAMUSCULAR; INTRAVENOUS; SUBCUTANEOUS at 08:58

## 2018-09-14 RX ADMIN — Medication 5 MG: at 11:56

## 2018-09-14 RX ADMIN — HEPARIN SODIUM 10 ML: 1000 INJECTION, SOLUTION INTRAVENOUS; SUBCUTANEOUS at 09:20

## 2018-09-14 RX ADMIN — LABETALOL HYDROCHLORIDE 5 MG: 5 INJECTION INTRAVENOUS at 11:44

## 2018-09-14 RX ADMIN — SIMVASTATIN 10 MG: 10 TABLET, FILM COATED ORAL at 22:40

## 2018-09-14 RX ADMIN — FENTANYL CITRATE 100 MCG: 50 INJECTION, SOLUTION INTRAMUSCULAR; INTRAVENOUS at 08:02

## 2018-09-14 RX ADMIN — Medication 0.5 MG: at 13:11

## 2018-09-14 RX ADMIN — REMIFENTANIL HYDROCHLORIDE 0.03 MCG/KG/MIN: 1 INJECTION, POWDER, LYOPHILIZED, FOR SOLUTION INTRAVENOUS at 08:19

## 2018-09-14 RX ADMIN — PHENYLEPHRINE HYDROCHLORIDE 100 MCG: 10 INJECTION, SOLUTION INTRAMUSCULAR; INTRAVENOUS; SUBCUTANEOUS at 08:31

## 2018-09-14 RX ADMIN — PHENYLEPHRINE HYDROCHLORIDE 200 MCG: 10 INJECTION, SOLUTION INTRAMUSCULAR; INTRAVENOUS; SUBCUTANEOUS at 08:36

## 2018-09-14 RX ADMIN — ASPIRIN 81 MG CHEWABLE TABLET 81 MG: 81 TABLET CHEWABLE at 18:08

## 2018-09-14 RX ADMIN — PHENYLEPHRINE HYDROCHLORIDE 150 MCG: 10 INJECTION, SOLUTION INTRAMUSCULAR; INTRAVENOUS; SUBCUTANEOUS at 08:12

## 2018-09-14 NOTE — ANESTHESIA PROCEDURE NOTES
Arterial Line Procedure Note  Staff:     Anesthesiologist:  NAY BIRD  Location: In OR After Induction  Line Placement:     Procedure:  Arterial Line    Insertion Site:  Radial    Insertion laterality:  Left    Skin Prep: Chloraprep      Patient Prep: patient draped, mask, sterile gloves, hat and hand hygiene      Local skin infiltration:  None    Ultrasound Guided?: No      Catheter size:  3 Fr, 5 cm    Cath secured with: other (comment)      Dressing:  Tegaderm    Complications:  None obvious    Arterial waveform: Yes      IBP within 10% of NIBP: Yes

## 2018-09-14 NOTE — OR NURSING
Dr Mcgee updated on patient condition.  Hypotension persists.  Groin site clean, dry and intact.  Fluid bolus infusing.Dr Victor here to see patient with orders received.

## 2018-09-14 NOTE — OR NURSING
Patient denies pain after dilaudid.  Resting quietly.  Blood pressure 80/50.  Dr. Hendricks made aware.  Patient alert and oriented.  Dressing clean, dry and intact to right groin.  Pulses without change.

## 2018-09-14 NOTE — ANESTHESIA CARE TRANSFER NOTE
Patient: Amos Walker    Procedure(s):  Right Common Femoral Endarterectomy with Bovine Patch Angioplasty, Right Lower Leg Arteriogram, Placement of 6 x 60mm Stent on Right Superficial Femoral Artery - Wound Class: I-Clean   - Wound Class: I-Clean   - Wound Class: I-Clean    Diagnosis: Critical Limb Ischemia  Diagnosis Additional Information: No value filed.    Anesthesia Type:   General     Note:  Airway :Face Mask  Patient transferred to:PACU  Comments: Patient oxygenating and ventilating well on 6LFM.  Patient ZHANG, following command, and denies pain.  Report given to RN and VSS.  Handoff Report: Identifed the Patient, Identified the Reponsible Provider, Reviewed the pertinent medical history, Discussed the surgical course, Reviewed Intra-OP anesthesia mangement and issues during anesthesia, Set expectations for post-procedure period and Allowed opportunity for questions and acknowledgement of understanding      Vitals: (Last set prior to Anesthesia Care Transfer)    CRNA VITALS  9/14/2018 1154 - 9/14/2018 1226      9/14/2018             Pulse: 66    ART BP: 121/43    Ht Rate: 66    SpO2: 100 %    Resp Rate (observed): 16    Resp Rate (set): 10                Electronically Signed By: LEW Welch CRNA  September 14, 2018  12:26 PM

## 2018-09-14 NOTE — IP AVS SNAPSHOT
Unit 6D Observation [address] [address]    Phone:  [phone]    Fax:  [phone]                                       After Visit Summary   9/14/2018    Amos Walker    MRN: [id number]           After Visit Summary Signature Page     I have received my discharge instructions, and my questions have been answered. I have discussed any challenges I see with this plan with the nurse or doctor.    ..........................................................................................................................................  Patient/Patient Representative Signature      ..........................................................................................................................................  Patient Representative Print Name and Relationship to Patient    ..................................................               ................................................  Date                                   Time    ..........................................................................................................................................  Reviewed by Signature/Title    ...................................................              ..............................................  Date                                               Time          22EPIC Rev 08/18

## 2018-09-14 NOTE — OR NURSING
Dr Victor updated on patient condition.  Patient complains of left upper arm pain., but falls back to sleep.  VSS.

## 2018-09-14 NOTE — ANESTHESIA POSTPROCEDURE EVALUATION
Patient: Amos Walker    Procedure(s):  Right Common Femoral Endarterectomy with Bovine Patch Angioplasty, Right Lower Leg Arteriogram, Placement of 6 x 60mm Stent on Right Superficial Femoral Artery - Wound Class: I-Clean   - Wound Class: I-Clean   - Wound Class: I-Clean    Diagnosis:Critical Limb Ischemia  Diagnosis Additional Information: No value filed.    Anesthesia Type:  General    Note:  Anesthesia Post Evaluation    Patient location during evaluation: PACU  Patient participation: Able to fully participate in evaluation  Level of consciousness: awake  Pain management: adequate  Airway patency: patent  Cardiovascular status: acceptable  Respiratory status: acceptable  Hydration status: acceptable  PONV: none     Anesthetic complications: None          Last vitals:  Vitals:    09/14/18 1345 09/14/18 1400 09/14/18 1415   BP: (!) 72/43 (!) 72/49 (!) 79/41   Resp: 14 16 14   Temp: 36  C (96.8  F)     SpO2: 100% 99% 100%         Electronically Signed By: Bambi Victor MD  September 14, 2018  2:49 PM

## 2018-09-14 NOTE — IP AVS SNAPSHOT
MRN:[id number]                      After Visit Summary   9/14/2018    Amos Walker    MRN: [id number]           Thank you!     Thank you for choosing [facility] for your care. Our goal is always to provide you with excellent care. Hearing back from our patients is one way we can continue to improve our services. Please take a few minutes to complete the written survey that you may receive in the mail after you visit with us. Thank you!        Patient Information     Date Of Birth          1945        About your hospital stay     You were admitted on:  September 14, 2018 You last received care in the:  Unit 6D Observation [facility]    You were discharged on:  September 15, 2018        Reason for your hospital stay       You had a right femoral endarterectomy and angioplasty/stent of your right superficial femoral artery                  Who to Call     For medical emergencies, please call 911.  For non-urgent questions about your medical care, please call your primary care provider or clinic, [phone]  For questions related to your surgery, please call your surgery clinic        Attending Provider     Provider Specialty    Augusto Maharaj MD Vascular Surgery       Primary Care Provider Office Phone # Fax #    Racheal Swift [phone]       When to contact your care team       Call the office or go to the emergency department if you have any of the following: temperature greater than 101,  increased shortness of breath, increased drainage, increased swelling in your groin, increased pain or coolness/numbness in your leg.                  After Care Instructions     Activity       Your activity upon discharge: activity as tolerated    No heavy lifting over 10 lb. You may shower and pat your incision dry with a clean towel. Surgical glue will fall off on its own. Do not drive on narcotic pain medications. Do not drive while having surgical pain.                   Follow-up Appointments     Follow Up and recommended labs and tests       For vascular surgery follow-up care, you should see one of the vascular surgery advanced practice providers (MARJORIE) in the outpatient clinic in 2 weeks. No tests should be done prior to seeing the MARJORIE.    You should see Dr. Maharaj in the outpatient clinic in  4 weeks for routine follow-up to check your progress.      With general vascular surgery questions, concerns, or to request/change an appointment, please call either:    Catia Lowery, Care Coordinator RN, Vascular Surgery  [phone]  Or   Vascular Call Center  [phone]    To contact someone after 5 pm, on a weekend, or on a Holiday, please call:  [facility]  [phone], option 4 to have a member of the Vascular Surgery Service paged.                  Your next 10 appointments already scheduled     Sep 18, 2018  9:30 AM CDT   [facility] Visit with Brayan Mcclain DPM   [facility]    [address] [facility] [address]   [phone]            Sep 25, 2018  2:30 PM CDT   Return Visit with Brayan Mcclain DPM   [facility]    [address] [facility] [address]   [phone]            Oct 02, 2018  3:00 PM CDT   Return Visit with Brayan Mcclain DPM   [facility]    [address] [facility] [address]   [phone]            Oct 09, 2018  3:00 PM CDT   Return Visit with Brayan Mcclain DPM   [facility]    [address] [facility] [address]   [phone]            Oct 16, 2018  2:30 PM CDT   Return Visit with Brayan Mcclain DPM   [facility]    [address] [facility] [address]   [phone]            Oct 23, 2018 12:30 PM CDT   Return  "Visit with Brayan Mcclain DPM   [facility]    [address] [place] [address]   [phone]            Nov 05, 2018  1:40 PM CST   (Arrive by 1:25 PM)   Return Visit with Racheal Swift MD   [facility] ([facility]    [address] [facility]  [address] [facility] [address] [phone]            Nov 06, 2018  3:00 PM CST   Return Visit with Brayan Mcclain DPM   [facility])    [address] [place] [address]   [phone]            Nov 13, 2018  1:00 PM CST   Return Visit with Brayan Mcclain DPM   [facility] ([facility])    [address] [place] [address]   [phone]            Nov 20, 2018  1:30 PM CST   Return Visit with Brayan Mcclain DPM   [facility] ([facility])    [address] [place] [address]   [phone]              Pending Results     Date and Time Order Name Status Description    9/14/2018 1358 EKG 12-lead, complete Preliminary     9/14/2018 1000 Surgical pathology exam In process             Statement of Approval     Ordered          09/15/18 1523  I have reviewed and agree with all the recommendations and orders detailed in this document.  EFFECTIVE NOW     Approved and electronically signed by:  Tari Mcgee MD             Admission Information     Date & Time Provider Department Dept. Phone    9/14/2018 Augusto Maharaj MD Unit 6D Observation [facility] [phone]      Your Vitals Were     Blood Pressure Temperature Respirations Height Weight Pulse Oximetry    133/68 (BP Location: Left arm) 98.4  F (36.9  C) (Oral) 16 1.892 m (6' 2.5\") 76.6 kg (168 lb 12.8 oz) 96%    BMI (Body Mass Index)                   21.38 kg/m2           MyChart Information     [facility] gives you secure access to your " electronic health record. If you see a primary care provider, you can also send messages to your care team and make appointments. If you have questions, please call your primary care clinic.  If you do not have a primary care provider, please call [phone] and they will assist you.        Care EveryWhere ID     This is your Care EveryWhere ID. This could be used by other organizations to access your [place] medical records  [id number]        Equal Access to Services     SAMSON MELTON : Hadii niurka cortezo Soger, waaxda luqadaha, qaybta kaalmada adedamiontamikada, yolanda lunaantoniromulo lopez. So [facility] [phone].    ATENCIÓN: Si habla bernard, tiene a rodrigues disposición servicios gratuitos de asistencia lingüística. Llame al [phone].    We comply with applicable federal civil rights laws and [place] laws. We do not discriminate on the basis of race, color, national origin, age, disability, sex, sexual orientation, or gender identity.               Review of your medicines      START taking        Dose / Directions    oxyCODONE IR 5 MG tablet   Commonly known as:  ROXICODONE   Used for:  Acute post-operative pain        Dose:  2.5-5 mg   Take 0.5-1 tablets (2.5-5 mg) by mouth every 4 hours as needed for other (pain control or improvement in physical function. Hold dose for analgesic side effects.)   Quantity:  12 tablet   Refills:  0         CONTINUE these medicines which may have CHANGED, or have new prescriptions. If we are uncertain of the size of tablets/capsules you have at home, strength may be listed as something that might have changed.        Dose / Directions    * clopidogrel 75 MG tablet   Commonly known as:  PLAVIX   This may have changed:  when to take this   Used for:  Peripheral vascular disease, unspecified        Dose:  75 mg   Take 1 tablet (75 mg) by mouth daily   Quantity:  30 tablet   Refills:  11       * clopidogrel 75 MG tablet   Commonly known as:  PLAVIX   This may have  changed:  Another medication with the same name was changed. Make sure you understand how and when to take each.   Used for:  Peripheral vascular disease (H)        Dose:  75 mg   Take 1 tablet (75 mg) by mouth daily   Quantity:  90 tablet   Refills:  0       gentamicin 0.1 % cream   Commonly known as:  GARAMYCIN   This may have changed:    - when to take this  - reasons to take this  - additional instructions   Used for:  Ulcer of right lower leg, with fat layer exposed (H), Chronic venous hypertension with ulcer involving right side (H), Type 2 diabetes, controlled, with neuropathy (H)        Apply topically daily To right leg ulcer.   Quantity:  30 g   Refills:  5       lisinopril 10 MG tablet   Commonly known as:  PRINIVIL/ZESTRIL   This may have changed:  when to take this   Used for:  Benign essential hypertension        Dose:  10 mg   Take 1 tablet (10 mg) by mouth daily   Quantity:  90 tablet   Refills:  3       * Notice:  This list has 2 medication(s) that are the same as other medications prescribed for you. Read the directions carefully, and ask your doctor or other care provider to review them with you.      CONTINUE these medicines which have NOT CHANGED        Dose / Directions    ammonium lactate 12 % cream   Commonly known as:  LAC-HYDRIN   Used for:  Venous stasis, Type 2 diabetes, controlled, with neuropathy (H)        Apply topically 2 times daily as needed for dry skin   Quantity:  385 g   Refills:  3       ascorbic acid 500 MG Tabs   Used for:  Ulcer of right lower leg, with fat layer exposed (H)        Dose:  500 mg   Take 1 tablet (500 mg) by mouth 2 times daily   Quantity:  30 tablet   Refills:  0       ASPIRIN PO        Dose:  81 mg   Take 81 mg by mouth daily   Refills:  0       blood glucose monitoring lancets   Used for:  Type 2 diabetes, uncontrolled, with neuropathy (H)        Use to test blood sugars 2 as directed.   Quantity:  3 Box   Refills:  3       Blood Pressure Kit   Used for:   "Benign essential hypertension        Dose:  1 Device   1 Device daily   Quantity:  1 kit   Refills:  0       ferrous sulfate 325 (65 Fe) MG tablet   Commonly known as:  IRON   Used for:  Peripheral vascular disease, unspecified        Dose:  325 mg   Take 1 tablet (325 mg) by mouth 2 times daily   Quantity:  60 tablet   Refills:  11       insulin glargine 100 UNIT/ML injection   Commonly known as:  LANTUS SOLOSTAR   Used for:  Type 2 diabetes mellitus with diabetic peripheral angiopathy without gangrene, with long-term current use of insulin (H)        Dose:  16 Units   Inject 16 Units Subcutaneous daily (with dinner) May take up to 22 units daily   Quantity:  3 mL   Refills:  3       insulin pen needle 31G X 8 MM   Commonly known as:  B-D U/F   Used for:  Diabetes mellitus, type II (H)        Use 1 daily as directed   Quantity:  100 each   Refills:  1       * nateglinide 120 MG tablet   Commonly known as:  STARLIX   Used for:  Type 2 diabetes, controlled, with neuropathy (H)        TAKE 1 TABLET BY MOUTH THREE TIMES DAILY BEFORE MEALS   Quantity:  90 tablet   Refills:  11       * nateglinide 120 MG tablet   Commonly known as:  STARLIX   Used for:  Diabetes mellitus (H)        Dose:  120 mg   Take 1 tablet (120 mg) by mouth 3 times daily (before meals)   Quantity:  90 tablet   Refills:  3       ONETOUCH ULTRA test strip   Used for:  Type 2 diabetes mellitus (H)   Generic drug:  blood glucose monitoring        USE TO TEST TWICE DAILY OR AS DIRECTED   Quantity:  200 strip   Refills:  3       OPTIFOAM 6\"X6\" Pads   Used for:  Ulcer of right leg, with fat layer exposed (H)        Dose:  1 Box   1 Box once a week   Quantity:  1 each   Refills:  6       order for DME   Used for:  Varicose veins of lower extremities with other complications, Venous stasis ulcer of right lower extremity (H)        Please measure and distribute 1 pair of 20mm Hg - 30mm Hg knee high ULCER CARE open or closed toe compression stockings.  Patient " has a size 13 foot and please take this into consideration.  Jobst or equivalent   Quantity:  2 each   Refills:  1       order for DME   Used for:  Varicose veins of both lower extremities with complications        Please measure and distribute 1 pair of 20mmHg - 30mmHg knee high open or closed toe compression stockings. Jobst ultrasheer or equivalent.   Quantity:  3 each   Refills:  12       order for DME   Used for:  Varicose veins of bilateral lower extremities with other complications        Please measure and distribute 1 pair of 30mmHg - 40mmHg knee high open toe ulcercare compression stockings. Jobst ultrasheer or equivalent.   Quantity:  2 each   Refills:  6       sildenafil 50 MG tablet   Commonly known as:  VIAGRA   Used for:  Vasculogenic erectile dysfunction, unspecified vasculogenic erectile dysfunction type        Dose:  50 mg   Take 1 tablet (50 mg) by mouth daily as needed for erectile dysfunction   Quantity:  10 tablet   Refills:  11       silver sulfADIAZINE 1 % cream   Commonly known as:  SILVADENE   Used for:  Ulcer of right lower leg, with fat layer exposed (H), Chronic venous hypertension with ulcer involving right side (H), Type 2 diabetes, controlled, with neuropathy (H)        Apply topically daily To affected areas on right foot and leg.   Quantity:  85 g   Refills:  5       simvastatin 10 MG tablet   Commonly known as:  ZOCOR   Used for:  Type 2 diabetes, controlled, with neuropathy (H)        Dose:  10 mg   Take 1 tablet (10 mg) by mouth At Bedtime   Quantity:  90 tablet   Refills:  3       triamcinolone 0.1 % cream   Commonly known as:  KENALOG   Used for:  Dermatitis of left foot        Apply sparingly to left heel daily.   Quantity:  60 g   Refills:  1       VITAMIN C PO        Dose:  1000 mg   Take 1,000 mg by mouth   Refills:  0       VITAMIN D (CHOLECALCIFEROL) PO        Dose:  1000 Units   Take 1,000 Units by mouth 2 times daily   Refills:  0       * Notice:  This list has 2  medication(s) that are the same as other medications prescribed for you. Read the directions carefully, and ask your doctor or other care provider to review them with you.         Where to get your medicines      Some of these will need a paper prescription and others can be bought over the counter. Ask your nurse if you have questions.     Bring a paper prescription for each of these medications     oxyCODONE IR 5 MG tablet                Protect others around you: Learn how to safely use, store and throw away your medicines at www.disposemymeds.org.        Information about OPIOIDS     PRESCRIPTION OPIOIDS: WHAT YOU NEED TO KNOW   We gave you an opioid (narcotic) pain medicine. It is important to manage your pain, but opioids are not always the best choice. You should first try all the other options your care team gave you. Take this medicine for as short a time (and as few doses) as possible.    Some activities can increase your pain, such as bandage changes or therapy sessions. It may help to take your pain medicine 30 to 60 minutes before these activities. Reduce your stress by getting enough sleep, working on hobbies you enjoy and practicing relaxation or meditation. Talk to your care team about ways to manage your pain beyond prescription opioids.    These medicines have risks:    DO NOT drive when on new or higher doses of pain medicine. These medicines can affect your alertness and reaction times, and you could be arrested for driving under the influence (DUI). If you need to use opioids long-term, talk to your care team about driving.    DO NOT operate heavy machinery    DO NOT do any other dangerous activities while taking these medicines.    DO NOT drink any alcohol while taking these medicines.     If the opioid prescribed includes acetaminophen, DO NOT take with any other medicines that contain acetaminophen. Read all labels carefully. Look for the word  acetaminophen  or  Tylenol.  Ask your pharmacist  if you have questions or are unsure.    You can get addicted to pain medicines, especially if you have a history of addiction (chemical, alcohol or substance dependence). Talk to your care team about ways to reduce this risk.    All opioids tend to cause constipation. Drink plenty of water and eat foods that have a lot of fiber, such as fruits, vegetables, prune juice, apple juice and high-fiber cereal. Take a laxative (Miralax, milk of magnesia, Colace, Senna) if you don t move your bowels at least every other day. Other side effects include upset stomach, sleepiness, dizziness, throwing up, tolerance (needing more of the medicine to have the same effect), physical dependence and slowed breathing.    Store your pills in a secure place, locked if possible. We will not replace any lost or stolen medicine. If you don t finish your medicine, please throw away (dispose) as directed by your pharmacist. The [place] [employer]ution [employer] has more information about safe disposal: https://www.pca.[facility].[place].us/living-green/managing-unwanted-medications             Medication List: This is a list of all your medications and when to take them. Check marks below indicate your daily home schedule. Keep this list as a reference.      Medications           Morning Afternoon Evening Bedtime As Needed    ammonium lactate 12 % cream   Commonly known as:  LAC-HYDRIN   Apply topically 2 times daily as needed for dry skin                                ascorbic acid 500 MG Tabs   Take 1 tablet (500 mg) by mouth 2 times daily                                ASPIRIN PO   Take 81 mg by mouth daily   Last time this was given:  81 mg on 9/15/2018 10:29 AM                                blood glucose monitoring lancets   Use to test blood sugars 2 as directed.                                Blood Pressure Kit   1 Device daily                                * clopidogrel 75 MG tablet   Commonly known as:  PLAVIX   Take 1 tablet (75 mg) by  "mouth daily   Last time this was given:  75 mg on 9/14/2018  8:12 PM                                * clopidogrel 75 MG tablet   Commonly known as:  PLAVIX   Take 1 tablet (75 mg) by mouth daily   Last time this was given:  75 mg on 9/14/2018  8:12 PM                                ferrous sulfate 325 (65 Fe) MG tablet   Commonly known as:  IRON   Take 1 tablet (325 mg) by mouth 2 times daily                                gentamicin 0.1 % cream   Commonly known as:  GARAMYCIN   Apply topically daily To right leg ulcer.                                insulin glargine 100 UNIT/ML injection   Commonly known as:  LANTUS SOLOSTAR   Inject 16 Units Subcutaneous daily (with dinner) May take up to 22 units daily   Last time this was given:  16 Units on 9/14/2018  6:08 PM                                insulin pen needle 31G X 8 MM   Commonly known as:  B-D U/F   Use 1 daily as directed                                lisinopril 10 MG tablet   Commonly known as:  PRINIVIL/ZESTRIL   Take 1 tablet (10 mg) by mouth daily                                * nateglinide 120 MG tablet   Commonly known as:  STARLIX   TAKE 1 TABLET BY MOUTH THREE TIMES DAILY BEFORE MEALS   Last time this was given:  120 mg on 9/15/2018  2:29 PM                                * nateglinide 120 MG tablet   Commonly known as:  STARLIX   Take 1 tablet (120 mg) by mouth 3 times daily (before meals)   Last time this was given:  120 mg on 9/15/2018  2:29 PM                                ONETOUCH ULTRA test strip   USE TO TEST TWICE DAILY OR AS DIRECTED   Generic drug:  blood glucose monitoring                                OPTIFOAM 6\"X6\" Pads   1 Box once a week                                order for DME   Please measure and distribute 1 pair of 20mm Hg - 30mm Hg knee high ULCER CARE open or closed toe compression stockings.  Patient has a size 13 foot and please take this into consideration.  Jobst or equivalent                                order for " DME   Please measure and distribute 1 pair of 20mmHg - 30mmHg knee high open or closed toe compression stockings. Jobst ultrasheer or equivalent.                                order for DME   Please measure and distribute 1 pair of 30mmHg - 40mmHg knee high open toe ulcercare compression stockings. Jobst ultrasheer or equivalent.                                oxyCODONE IR 5 MG tablet   Commonly known as:  ROXICODONE   Take 0.5-1 tablets (2.5-5 mg) by mouth every 4 hours as needed for other (pain control or improvement in physical function. Hold dose for analgesic side effects.)                                sildenafil 50 MG tablet   Commonly known as:  VIAGRA   Take 1 tablet (50 mg) by mouth daily as needed for erectile dysfunction                                silver sulfADIAZINE 1 % cream   Commonly known as:  SILVADENE   Apply topically daily To affected areas on right foot and leg.                                simvastatin 10 MG tablet   Commonly known as:  ZOCOR   Take 1 tablet (10 mg) by mouth At Bedtime   Last time this was given:  10 mg on 9/14/2018 10:40 PM                                triamcinolone 0.1 % cream   Commonly known as:  KENALOG   Apply sparingly to left heel daily.                                VITAMIN C PO   Take 1,000 mg by mouth                                VITAMIN D (CHOLECALCIFEROL) PO   Take 1,000 Units by mouth 2 times daily                                * Notice:  This list has 4 medication(s) that are the same as other medications prescribed for you. Read the directions carefully, and ask your doctor or other care provider to review them with you.
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
## 2018-09-14 NOTE — OP NOTE
Date of procedure: September 14, 2018    Preop Dx: Atherosclerosis of right lower extremity with ulcerations    Postop Dx: Same     Procedures:  1. Right femoral endarterectomy with bovine patch angioplasty  2. Aortogram with pelvic runoff, radiologic supervision and interpretation  3. Right lower extremity arteriogram, radiologic supervision and interpretation  4. Transluminal angioplasty right popliteal artery  5. Placement of 6 X 60 self-expanding stent in right SFA     Surgeon: ADAM Maharaj MD     Assistant: Tari Mcgee MD     Anesthesia: GETT     Complications: None     EBL: 50 ml    Contrast: 40 ml    Radiation dose: 44 mGy    Specimens: right femoral plaque     Indications: This 73 year old YO male  Has a long history of nonhealing ulcers in the right anterior ankle and lateral foot. CTA confirmed multilevel disease with near occlusion of the right common femoral and profunda arteries as well as occlusion of the superficial femoral artery-popliteal arteries. To increase the chance of ulcer healing, the patient was  Scheduled for right femoral endarterectomy and possible endovascular treatment of the iliac and/or fem-pop arterial segments. I discussed the risks and benefits of the procedure with the patient, and consent was signed.      Findings:   1. Aorta and right iliac system with diffuse calcific atherosclerosis but no evidence of hemodynamically significant lesions  2. Right femoral endarterectomy performed in medial plane with extraction of a large calcified plaque covering the orifice of the profunda.  3. Successful passage of catheter into the right peroneal artery from the open femoral endarterectomy area.   4. Successful angioplasty of the above-knee popliteal artery   5. Successful deployment of a 6 X 60 self-expanbing stent in the right SFA  6. Completion angiogram demonstrated widely patent arterial system with rapid flow and two-vessel runoff to the foot via peroneal and PT  arteries.     Description of operation: The patient was brought to the operating room and placed in the supine position. After a satisfactory level of general endotracheal anesthesia, the patient's right groin and abdomen were prepped and draped in the usual sterile fashion. A time out was called. A vertical incision was made in the right groin directly over the weak femoral pulse. This was continued through subcutaneous tissue, taking care to ligate lymphatic tissue with 2-0 silk ties. The underlying common femoral, superficial femoral and profunda arteries were each exposed circumferentially and encircles with vessiloops. 10,000 units of heparin were administered IV, and the arteries were clamred. An anterior arteriotomy was made in the common femoral artery and extended into the anterior SFA. The heavily calcifies plaque was endarterectomized in the medial plane, with feathering in the Profunda and SFA. Next, a 6 Fr sheath was advanced into the proximal end of the endarterectomy and an aortogram with pelvic runoff was performed. After the sheath was removed and the artery re-clamped, a 5 Fr sheath was advanced into the SFA through the distal endarterectomy incision. A glidewire was placed through the sheath and guided through the SFA and popliteal artery into the peroneal artery with an angle glide catheter. The wire was exchanged for a stiff glidewire, and an angiogram was performed showing two discreet areas of narrowing. The popliteal artery stenosis was dilated with a 5 X 60 balloon with excellent angiographic results. The proximal SFA stenosis was dilated, but there was residual narrowing of > 70%. Therefore,m the sheath was upsized to a 6 Fr and a 6 X 60 self-expaning stent was placed in the stenosis. This was dilated using a 5 fr balloon with excellent arteriographic results. The sheath and wire were then removed, and a bovine patch was placed on the arteriotomy site using 5-0 prolene. After meticulous  hemostasis was assured, the wound was closed using 3-0 vicyrl for the deep layers and 4-0 monocryl for the skin. The patient tolerated the procedure well and there were no complications noted. Sponge and instrument counts X 2 were said to be correct. I was present, scrubbed and supervised all key and critical portions of this case.      ADAM Maharaj MD  Professor of Surgery  Division of Vascular Surgery

## 2018-09-15 VITALS
HEIGHT: 75 IN | WEIGHT: 168.8 LBS | RESPIRATION RATE: 16 BRPM | SYSTOLIC BLOOD PRESSURE: 133 MMHG | DIASTOLIC BLOOD PRESSURE: 68 MMHG | BODY MASS INDEX: 20.99 KG/M2 | OXYGEN SATURATION: 96 % | TEMPERATURE: 98.4 F

## 2018-09-15 PROBLEM — L98.499 NON-HEALING ULCER (H): Status: ACTIVE | Noted: 2018-09-15

## 2018-09-15 LAB
ANION GAP SERPL CALCULATED.3IONS-SCNC: 7 MMOL/L (ref 3–14)
BUN SERPL-MCNC: 22 MG/DL (ref 7–30)
CALCIUM SERPL-MCNC: 8 MG/DL (ref 8.5–10.1)
CHLORIDE SERPL-SCNC: 107 MMOL/L (ref 94–109)
CO2 SERPL-SCNC: 24 MMOL/L (ref 20–32)
CREAT SERPL-MCNC: 1 MG/DL (ref 0.66–1.25)
ERYTHROCYTE [DISTWIDTH] IN BLOOD BY AUTOMATED COUNT: 14.2 % (ref 10–15)
GFR SERPL CREATININE-BSD FRML MDRD: 74 ML/MIN/1.7M2
GLUCOSE BLDC GLUCOMTR-MCNC: 116 MG/DL (ref 70–99)
GLUCOSE BLDC GLUCOMTR-MCNC: 75 MG/DL (ref 70–99)
GLUCOSE BLDC GLUCOMTR-MCNC: 86 MG/DL (ref 70–99)
GLUCOSE SERPL-MCNC: 80 MG/DL (ref 70–99)
HCT VFR BLD AUTO: 28.2 % (ref 40–53)
HGB BLD-MCNC: 8.8 G/DL (ref 13.3–17.7)
MCH RBC QN AUTO: 30.4 PG (ref 26.5–33)
MCHC RBC AUTO-ENTMCNC: 31.2 G/DL (ref 31.5–36.5)
MCV RBC AUTO: 98 FL (ref 78–100)
PLATELET # BLD AUTO: 139 10E9/L (ref 150–450)
POTASSIUM SERPL-SCNC: 4.3 MMOL/L (ref 3.4–5.3)
RBC # BLD AUTO: 2.89 10E12/L (ref 4.4–5.9)
SODIUM SERPL-SCNC: 138 MMOL/L (ref 133–144)
WBC # BLD AUTO: 5.4 10E9/L (ref 4–11)

## 2018-09-15 PROCEDURE — 82962 GLUCOSE BLOOD TEST: CPT

## 2018-09-15 PROCEDURE — 36415 COLL VENOUS BLD VENIPUNCTURE: CPT

## 2018-09-15 PROCEDURE — 85027 COMPLETE CBC AUTOMATED: CPT

## 2018-09-15 PROCEDURE — 25000128 H RX IP 250 OP 636

## 2018-09-15 PROCEDURE — G0378 HOSPITAL OBSERVATION PER HR: HCPCS

## 2018-09-15 PROCEDURE — 80048 BASIC METABOLIC PNL TOTAL CA: CPT

## 2018-09-15 PROCEDURE — 96372 THER/PROPH/DIAG INJ SC/IM: CPT

## 2018-09-15 PROCEDURE — 25000132 ZZH RX MED GY IP 250 OP 250 PS 637: Mod: GY

## 2018-09-15 PROCEDURE — A9270 NON-COVERED ITEM OR SERVICE: HCPCS | Mod: GY

## 2018-09-15 RX ORDER — OXYCODONE HYDROCHLORIDE 5 MG/1
2.5-5 TABLET ORAL EVERY 4 HOURS PRN
Qty: 12 TABLET | Refills: 0 | Status: SHIPPED | OUTPATIENT
Start: 2018-09-15 | End: 2019-04-01

## 2018-09-15 RX ADMIN — SODIUM CHLORIDE, POTASSIUM CHLORIDE, SODIUM LACTATE AND CALCIUM CHLORIDE: 600; 310; 30; 20 INJECTION, SOLUTION INTRAVENOUS at 03:16

## 2018-09-15 RX ADMIN — ACETAMINOPHEN 650 MG: 325 TABLET, FILM COATED ORAL at 06:53

## 2018-09-15 RX ADMIN — ASPIRIN 81 MG CHEWABLE TABLET 81 MG: 81 TABLET CHEWABLE at 10:29

## 2018-09-15 RX ADMIN — ACETAMINOPHEN 650 MG: 325 TABLET, FILM COATED ORAL at 00:34

## 2018-09-15 RX ADMIN — NATEGLINIDE 120 MG: 60 TABLET, COATED ORAL at 10:28

## 2018-09-15 RX ADMIN — ACETAMINOPHEN 650 MG: 325 TABLET, FILM COATED ORAL at 14:29

## 2018-09-15 RX ADMIN — ENOXAPARIN SODIUM 40 MG: 100 INJECTION SUBCUTANEOUS at 06:54

## 2018-09-15 RX ADMIN — NATEGLINIDE 120 MG: 60 TABLET, COATED ORAL at 14:29

## 2018-09-15 RX ADMIN — CEFAZOLIN SODIUM 1 G: 1 INJECTION, POWDER, FOR SOLUTION INTRAMUSCULAR; INTRAVENOUS at 03:16

## 2018-09-15 ASSESSMENT — ACTIVITIES OF DAILY LIVING (ADL)
DRESS: 0-->INDEPENDENT
SWALLOWING: 0-->SWALLOWS FOODS/LIQUIDS WITHOUT DIFFICULTY
BATHING: 0-->INDEPENDENT
FALL_HISTORY_WITHIN_LAST_SIX_MONTHS: NO
TRANSFERRING: 0-->INDEPENDENT
RETIRED_EATING: 0-->INDEPENDENT
COGNITION: 0 - NO COGNITION ISSUES REPORTED
TOILETING: 0-->INDEPENDENT
AMBULATION: 0-->INDEPENDENT
RETIRED_COMMUNICATION: 0-->UNDERSTANDS/COMMUNICATES WITHOUT DIFFICULTY

## 2018-09-15 NOTE — PLAN OF CARE
Problem: Patient Care Overview  Goal: Discharge Needs Assessment  Problem: Patient Care Overview  Goal: Discharge Needs Assessment  Observation goals PER UNIT ROUTINE     Comments:   Vital signs stable; Yes       Pain controlled on oral medications: yes, On scheduled Tylenol. Administered x 2 this shift.    Tolerating diet: Yes, drinking fluids, tolerating regular diet.    Return of bowel and bladder function: Yes. On prior shift, Pt had a moderate formed BM in the early am. Daniel was out at 0500 and voiding spontaneously of good amounts  Blood sugar 86 this AM, 116 prior to lunch.  Right groin site CDI, no hematoma noted. Patient's wife at bedside. Patient awaiting discharge

## 2018-09-15 NOTE — PLAN OF CARE
Problem: Patient Care Overview  Goal: Discharge Needs Assessment  Outcome: Improving  Goals:   Vital signs stable - Met, BPs improved, VSS on RA.   Pain controlled on oral medications - Met, denies pain. Refusing Tylenol.   Tolerating diet - Met, ate 100% of meal, no nausea.   Return of bowel and bladder function - Not met, menjivar catheter still in place per orders, menjivar to be removed at 0030. No bowel movement yet.

## 2018-09-15 NOTE — PLAN OF CARE
"Problem: Patient Care Overview  Goal: Discharge Needs Assessment  Problem: Patient Care Overview  Goal: Discharge Needs Assessment  Observation goals PER UNIT ROUTINE     Comments:   Vital signs stable; Yes,  /59 (BP Location: Left arm)  Temp 98.4  F (36.9  C) (Oral)  Resp 16  Ht 1.892 m (6' 2.5\")  Wt 74.7 kg (164 lb 10.9 oz)  SpO2 96%  BMI 20.86 kg/m2     Pain controlled on oral medications: yes, On schedule Tylenol. Administered x 2 this shift.    Tolerating diet: Yes, drinking fluids.    Return of bowel and bladder function: Yes. Pt had a moderate formed BM. Daniel out at 0500 and voided 125 ml at 0545.  Blood sugar 75 this AM. Apple juice given and tolerated. Right groin site CDI, no hematoma noted. Slept between cares.Possible discharge this AM            "

## 2018-09-15 NOTE — PROGRESS NOTES
Transfer  Transferred to: 6D  Via:bed  Reason for transfer:Pt no longer appropriate for 6B, registered to outpatient observation status.  Family: Pt states wife will be aware tomorrow, too late to call  Belongings: Packed and sent with pt  Chart: Delivered with pt to next unit  Medications: Meds sent to new unit with pt  Report given to: SAFIA Sanford  Pt status: VSS. HR 70-80's. 's/60's. Afebrile. Adequate urine output with menjivar output 275ml. Sating >96% on RA.

## 2018-09-15 NOTE — PROGRESS NOTES
"\"What is outpatient observation\" brochure given to pt and discussed. Questions answered and also discussed with pt wife.   "

## 2018-09-15 NOTE — PLAN OF CARE
Problem: Patient Care Overview  Goal: Plan of Care/Patient Progress Review  Outcome: No Change   09/14/18 2300   OTHER   Plan Of Care Reviewed With patient   Plan of Care Review   Progress no change     Temp: 98.5  F (36.9  C) Temp src: Oral BP: 131/65   Heart Rate: 85 Resp: 16 SpO2: 96 % O2 Device: None (Room air)     Patient arrived to 6D from 6B at 22:20:    -denies pain, nausea and SOB  -alert and oriented x 4  -LR at 125 ml/hr  -menjivar patent  -has not has any BM yet    Continue with POC

## 2018-09-15 NOTE — PROVIDER NOTIFICATION
Text paged Vascular Surgery Radha Miranda #[phone] with requests;  needing orders for: 1.How/supplies needed for dressing change, 2. freq of VS.  Patient ambulated in hallway with walker & SBA did well. Inquiring about discharge

## 2018-09-15 NOTE — PLAN OF CARE
Problem: Patient Care Overview  Goal: Plan of Care/Patient Progress Review  Outcome: Improving  Neuro: A&Ox4.   Cardiac: SR, HR 70-80's. 's/60's. Afebrile. Dorsalis pedis absent. Posterior tibial present with doppler. VSS.   Respiratory: Sating >96% on RA.  GI/: Adequate urine output with menjivar catheter.   Diet/appetite: Tolerating consistent carb diet. Eating well. BG checks ACHS.   Activity:  Assist of 1 up in room. On bedrest for evening.  Pain: At acceptable level on current regimen.   Skin: RLE wounds on foot covered. No new deficits noted.  LDA's: L PIV SL. R PIV infusing LR at 125ml/hr.    Plan: Transferred to  at 10:30 pm. Last BG at 2200 was 133. Plan for possible discharge tomorrow. Continue with POC. Notify primary team with changes.

## 2018-09-15 NOTE — UTILIZATION REVIEW
"  Admission Status; Secondary Review Determination     Admission Date: 9/14/2018  5:36 AM      Under the authority of the Utilization Management Committee, the utilization review process indicated a secondary review on the above patient.  The review outcome is based on review of the medical records, discussions with staff, and applying clinical experience noted on the date of the review.           (x) Outpatient Status Appropriate - This patient does not meet hospital inpatient criteria and is placed in outpatient status. If this patient's primary payer is Medicare and was admitted as an inpatient, Condition Code 44 should be used and patient status changed to \"outpatient\".           RATIONALE FOR DETERMINATION   Patient is a 73-year-old male with right lower extremity atherosclerosis.  He underwent the following procedure on 9/14/18:  1. Right femoral endarterectomy with bovine patch angioplasty  2. Aortogram with pelvic runoff, radiologic supervision and interpretation  3. Right lower extremity arteriogram, radiologic supervision and interpretation  4. Transluminal angioplasty right popliteal artery  5. Placement of 6 X 60 self-expanding stent in right SFA    This procedure is usually done in the outpatient setting.  Patient's postoperative course has been noted to be unremarkable.  He is improving today.  He should be able to discharge from the hospital soon.  Patient did have an observation order placed after his procedure.  Correct hospital status for this patient after his above-noted procedure is outpatient status for routine postoperative care.    The severity of illness, intensity of service provided, expected LOS and risk for adverse outcome make the care appropriate for outpatient status at the hospital.        The information on this document is developed by the utilization review team in order for the business office to ensure compliance.  This only denotes the appropriateness of proper admission status " and does not reflect the quality of care rendered.         The definitions of Inpatient Status and Observation Status used in making the determination above are those provided in the CMS Coverage Manual, Chapter 1 and Chapter 6, section 70.4.      Sincerely,     Saran Pleitez D.O.  Utilization Review/ Case Management  [facility].

## 2018-09-15 NOTE — PLAN OF CARE
Problem: Patient Care Overview  Goal: Discharge Needs Assessment  Problem: Patient Care Overview  Goal: Discharge Needs Assessment  Observation goals PER UNIT ROUTINE     Comments:   Vital signs stable; Yes      Pain controlled on oral medications: yes, On scheduled Tylenol. Administered x 2 this shift.    Tolerating diet: Yes, drinking fluids, tolerating regular diet.    Return of bowel and bladder function: Yes. On prior shift, Pt had a moderate formed BM in the early am. Daniel was out at 0500 and voiding spontaneously of good amounts  Blood sugar 86 this AM.   Right groin site CDI, no hematoma noted. Patient awaiting discharge

## 2018-09-15 NOTE — PLAN OF CARE
Problem: Patient Care Overview  Goal: Discharge Needs Assessment  Observation goals PER UNIT ROUTINE     Comments:   Vital signs stable; Yes     Pain controlled on oral medications: yes, On schedule Tylenol    Tolerating diet ; Yes    Return of bowel and bladder function; Not yet

## 2018-09-15 NOTE — PROGRESS NOTES
Transfer  Transferred from: PACU  Via:bed  Reason for transfer: Pt appropriate for 6B  Family: Wife aware of transfer  Belongings: Received with pt  Chart: Received with pt  Medications: Meds received from old unit with pt  2 RN Skin Assessment Completed By: Candy De La Fuente and Sarah Hobbs  Report received from: SAFIA Simmons   Pt status: VSS, Alert and oriented. HR SR/SB 60's. Afebrile. Sating 100% on 2 L NC. Daniel catheter with adequate output 125ml/hr.

## 2018-09-15 NOTE — PLAN OF CARE
Problem: Patient Care Overview  Goal: Discharge Needs Assessment  Problem: Patient Care Overview  Goal: Discharge Needs Assessment  Observation goals PER UNIT ROUTINE     Comments:   Vital signs stable; Yes       Pain controlled on oral medications: yes, On scheduled Tylenol. Administered x 2 this shift.    Tolerating diet: Yes, drinking fluids, tolerating regular diet.    Return of bowel and bladder function: Yes. On prior shift, Pt had a moderate formed BM in the early am. Daniel was out at 0500 and voiding spontaneously of good amounts  Blood sugar 86 this AM, 116 prior to lunch.  Right groin site CDI, no hematoma noted. Patient awaiting discharge

## 2018-09-15 NOTE — PROVIDER NOTIFICATION
Attempt to page Vascular Surgery #[phone], [phone], with no response.   Paged #[phone] - provider will consult with team and relay information from prior text page. Awaiting answers from Vascular Surgery.

## 2018-09-15 NOTE — PROGRESS NOTES
"VASCULAR SURGERY PROGRESS NOTE    Subjective:  Blood pressure improved overnight. Daniel catheter removed and pt voiding adequately. Pain controlled this AM. Ambulated.    Objective:  Intake/Output Summary (Last 24 hours) at 09/15/18 0747  Last data filed at 09/15/18 0547   Gross per 24 hour   Intake          3297.92 ml   Output             3065 ml   Net           232.92 ml     Labs:  ROUTINE IP LABS (Last four results)  BMP  Recent Labs  Lab 09/15/18  0648 09/14/18  0614     --    POTASSIUM 4.3 4.5   CHLORIDE 107  --    CLAUDIA 8.0*  --    CO2 24  --    BUN 22  --    CR 1.00 1.13   GLC 80  --      CBC  Recent Labs  Lab 09/15/18  0648 09/14/18  1451 09/14/18  0614   WBC 5.4  --  6.0   RBC 2.89*  --  3.44*   HGB 8.8* 8.2* 10.6*   HCT 28.2*  --  33.3*   MCV 98  --  97   MCH 30.4  --  30.8   MCHC 31.2*  --  31.8   RDW 14.2  --  14.2   *  --  207     INRNo lab results found in last 7 days.  PHYSICAL EXAM:  /58 (BP Location: Left arm)  Temp 98.6  F (37  C) (Oral)  Resp 15  Ht 1.892 m (6' 2.5\")  Wt 74.7 kg (164 lb 10.9 oz)  SpO2 100%  BMI 20.86 kg/m2  General: The patient is alert and oriented. Appropriate. No acute distress  Psych: pleasant affect, answers questions appropriately  Skin: Color appropriate for race, warm, dry.  Respiratory: The patient does not require supplemental oxygen. Breathing unlabored  GI:  Abdomen soft, nontender to light palpation.  Extremities: R groin incision C/D/I with mild surrounding ecchymosis, R foot dressing intact, R PT/peroneal dopplerable biphasic signals        ASSESSMENT:  73M s/p R femoral endarterectomy, SFA angioplasty/stent      PLAN:  OOB & ambulate in hallway  Pain control with PO meds  Continue ASA/plavix  Discharge to home today     Tari Mcgee MD  Vascular Surgery Fellow  Pgr [phone]                  "

## 2018-09-15 NOTE — PROGRESS NOTES
Discharge instruction reviewed.  Patient verbalized understanding. PIV removed, transport ordered with w/c for patient to discharge pharmacy then main lobby with Family. Patient discharged with all belongings.
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
## 2018-09-17 LAB
COPATH REPORT: NORMAL
INTERPRETATION ECG - MUSE: NORMAL

## 2018-09-18 ENCOUNTER — OFFICE VISIT (OUTPATIENT)
Dept: PODIATRY | Facility: CLINIC | Age: 73
End: 2018-09-18
Payer: MEDICARE

## 2018-09-18 ENCOUNTER — TELEPHONE (OUTPATIENT)
Dept: PODIATRY | Facility: CLINIC | Age: 73
End: 2018-09-18

## 2018-09-18 VITALS
TEMPERATURE: 98.2 F | DIASTOLIC BLOOD PRESSURE: 69 MMHG | HEART RATE: 84 BPM | OXYGEN SATURATION: 100 % | WEIGHT: 167 LBS | SYSTOLIC BLOOD PRESSURE: 153 MMHG | BODY MASS INDEX: 21.43 KG/M2 | HEIGHT: 74 IN

## 2018-09-18 DIAGNOSIS — L97.512 SKIN ULCER OF RIGHT FOOT WITH FAT LAYER EXPOSED (H): ICD-10-CM

## 2018-09-18 DIAGNOSIS — L97.912 ULCER OF RIGHT LOWER EXTREMITY WITH FAT LAYER EXPOSED (H): Primary | ICD-10-CM

## 2018-09-18 DIAGNOSIS — E11.51 DIABETES MELLITUS WITH PERIPHERAL VASCULAR DISEASE (H): ICD-10-CM

## 2018-09-18 DIAGNOSIS — E11.49 TYPE II OR UNSPECIFIED TYPE DIABETES MELLITUS WITH NEUROLOGICAL MANIFESTATIONS, NOT STATED AS UNCONTROLLED(250.60) (H): ICD-10-CM

## 2018-09-18 PROCEDURE — 99213 OFFICE O/P EST LOW 20 MIN: CPT | Performed by: PODIATRIST
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
## 2018-09-18 NOTE — NURSING NOTE
"Amos Walker's goals for this visit include:   Chief Complaint   Patient presents with     RECHECK     Right leg ulcer - nerve block has worn off       He requests these members of his care team be copied on today's visit information: yes      PCP: Racheal Swift    Referring Provider:  No referring provider defined for this encounter.    /69  Pulse 84  Temp 98.2  F (36.8  C)  Ht 1.88 m (6' 2\")  Wt 75.8 kg (167 lb)  SpO2 100%  BMI 21.44 kg/m2    Estela Leigh CMA (AAMA)    "

## 2018-09-18 NOTE — TELEPHONE ENCOUNTER
Financial Counselor Review for Apligraf, Dermagraft or Puraply AM:    Procedure to be performed (include CPT code):Yes apligraf, puraply am    Diagnosis code (include ICD-10 code): L97.512, E11.51, 250.60 (H) E11.49    Coverage and patient financial responsibility information:YES    Does patient need to be contacted by Financial Counselor:YES    Has the patient tried Apligraf, Dermagraft or Puraply before    Note: Do not use abbreviations and route encounter to
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
## 2018-09-18 NOTE — MR AVS SNAPSHOT
After Visit Summary   9/18/2018    Amos Walker    MRN: [id number]           Patient Information     Date Of Birth          1945        Visit Information        Provider Department      9/18/2018 9:30 AM Brayan Mcclain DPM [facility]        Today's Diagnoses     Ulcer of right lower extremity with fat layer exposed (H)    -  1    Skin ulcer of right foot with fat layer exposed (H)        Type II or unspecified type diabetes mellitus with neurological manifestations, not stated as uncontrolled(250.60) (H)        Diabetes mellitus with peripheral vascular disease (H)           Follow-ups after your visit        Your next 10 appointments already scheduled     Sep 25, 2018  2:30 PM CDT   Return Visit with Brayan Mcclain DPM   [facility] ([facility])    [address] [place] [address]   [phone]            Oct 02, 2018  3:00 PM CDT   Return Visit with Brayan Mcclain DPM   [facility] ([facility])    [address] [address] [address]   [phone]            Oct 09, 2018 12:30 PM CDT   (Arrive by 12:15 PM)   Return Vascular Visit with LEW Reynoso [facility] ([facility])    [address] [place] [address]   [phone]            Oct 09, 2018  3:00 PM CDT   Return Visit with Brayan Mcclain DPM   [facility] ([facility])    [address] [facility] [address]   [phone]            Oct 16, 2018  2:30 PM CDT   Return Visit with Brayan Mcclain DPM   [facility] ([facility])    [address] [address] [address]   [phone]            Oct 23, 2018 12:30 PM CDT   Return Visit with Brayan Mcclain DPM   [facility] ([facility])    [address] [place] [address]  [place] [address]   [phone]            Nov 05, 2018  1:40 PM CST   (Arrive by 1:25 PM)   Return Visit with Racheal Swift MD   [facility] ([facility])    [address] [facility]  [address] [facility] [address]   [phone]            Nov 06, 2018  3:00 PM CST   Return Visit with Brayan Mcclain DPM   [facility] ([facility])    [address] [place] [address]   [phone]            Nov 13, 2018  3:00 PM CST   Return Visit with Brayan Mcclain DPM   [facility] ([facility])    [address] [place] [address]   [phone]            Nov 20, 2018  2:30 PM CST   Return Visit with Brayan Mcclain DPM   [facility] ([facility])    [address] [place] [address] [phone]              Who to contact     If you have questions or need follow up information about today's clinic visit or your schedule please contact [facility] directly at [phone].  Normal or non-critical lab and imaging results will be communicated to you by MyChart, letter or phone within 4 business days after the clinic has received the results. If you do not hear from us within 7 days, please contact the clinic through [employer] or phone. If you have a critical or abnormal lab result, we will notify you by phone as soon as possible.  Submit refill requests through [employer] or call your pharmacy and they will forward the refill request to us. Please allow 3 business days for your refill to be completed.          Additional Information About Your Visit        [employer] Information     [employer] gives you secure access to your electronic health record. If you see a primary care provider, you can also send messages to your care team and make appointments. If you have questions, please call your primary care clinic.  If you do not  "have a primary care provider, please call [phone] and they will assist you.      [employer] ? is an electronic gateway that provides easy, online access to your medical records. With [employer] ?, you can request a clinic appointment, read your test results, renew a prescription or communicate with your care team.     To access your existing account, please contact your [facility] or call [phone] for assistance.        Care EveryWhere ID     This is your Care EveryWhere ID. This could be used by other organizations to access your [place] medical records  [id number]        Your Vitals Were     Pulse Temperature Height Pulse Oximetry BMI (Body Mass Index)       84 98.2  F (36.8  C) 1.88 m (6' 2\") 100% 21.44 kg/m2        Blood Pressure from Last 3 Encounters:   09/18/18 153/69   09/15/18 133/68   09/11/18 110/54    Weight from Last 3 Encounters:   09/18/18 75.8 kg (167 lb)   09/15/18 76.6 kg (168 lb 12.8 oz)   09/04/18 75.9 kg (167 lb 4.8 oz)              Today, you had the following     No orders found for display         Today's Medication Changes          These changes are accurate as of 9/18/18 11:59 PM.  If you have any questions, ask your nurse or doctor.               These medicines have changed or have updated prescriptions.        Dose/Directions    * clopidogrel 75 MG tablet   Commonly known as:  PLAVIX   This may have changed:  when to take this   Used for:  Peripheral vascular disease, unspecified        Dose:  75 mg   Take 1 tablet (75 mg) by mouth daily   Quantity:  30 tablet   Refills:  11       * clopidogrel 75 MG tablet   Commonly known as:  PLAVIX   This may have changed:  Another medication with the same name was changed. Make sure you understand how and when to take each.   Used for:  Peripheral vascular disease (H)        Dose:  75 mg   Take 1 tablet (75 mg) by mouth daily   Quantity:  90 tablet   Refills:  0       gentamicin 0.1 % cream   Commonly known as:  " GARAMYCIN   This may have changed:    - when to take this  - reasons to take this  - additional instructions   Used for:  Ulcer of right lower leg, with fat layer exposed (H), Chronic venous hypertension with ulcer involving right side (H), Type 2 diabetes, controlled, with neuropathy (H)        Apply topically daily To right leg ulcer.   Quantity:  30 g   Refills:  5       lisinopril 10 MG tablet   Commonly known as:  PRINIVIL/ZESTRIL   This may have changed:  when to take this   Used for:  Benign essential hypertension        Dose:  10 mg   Take 1 tablet (10 mg) by mouth daily   Quantity:  90 tablet   Refills:  3       * Notice:  This list has 2 medication(s) that are the same as other medications prescribed for you. Read the directions carefully, and ask your doctor or other care provider to review them with you.             Primary Care Provider Office Phone # Fax #    Racheal Swift [phone]       [address] [address]        Equal Access to Services     [facility] E[facility]NA [facility] : Hadii niurka cortezo Soger, waaxda luqadaha, qaybta kaalmada adeegyakylee, yolanda duran . So [facility] [phone].    ATENCIÓN: Si pancho wagner, tiene a rodrigues disposición servicios gratuitos de asistencia lingüística. Llame al [phone].    We comply with applicable federal civil rights laws and [place] laws. We do not discriminate on the basis of race, color, national origin, age, disability, sex, sexual orientation, or gender identity.            Thank you!     Thank you for choosing [facility]  for your care. Our goal is always to provide you with excellent care. Hearing back from our patients is one way we can continue to improve our services. Please take a few minutes to complete the written survey that you may receive in the mail after your visit with us. Thank you!             Your Updated Medication List - Protect others around you: Learn how  to safely use, store and throw away your medicines at www.disposemymeds.org.          This list is accurate as of 9/18/18 11:59 PM.  Always use your most recent med list.                   Brand Name Dispense Instructions for use Diagnosis    ammonium lactate 12 % cream    LAC-HYDRIN    385 g    Apply topically 2 times daily as needed for dry skin    Venous stasis, Type 2 diabetes, controlled, with neuropathy (H)       ascorbic acid 500 MG Tabs     30 tablet    Take 1 tablet (500 mg) by mouth 2 times daily    Ulcer of right lower leg, with fat layer exposed (H)       ASPIRIN PO      Take 81 mg by mouth daily        blood glucose monitoring lancets     3 Box    Use to test blood sugars 2 as directed.    Type 2 diabetes, uncontrolled, with neuropathy (H)       Blood Pressure Kit     1 kit    1 Device daily    Benign essential hypertension       * clopidogrel 75 MG tablet    PLAVIX    30 tablet    Take 1 tablet (75 mg) by mouth daily    Peripheral vascular disease, unspecified       * clopidogrel 75 MG tablet    PLAVIX    90 tablet    Take 1 tablet (75 mg) by mouth daily    Peripheral vascular disease (H)       ferrous sulfate 325 (65 Fe) MG tablet    IRON    60 tablet    Take 1 tablet (325 mg) by mouth 2 times daily    Peripheral vascular disease, unspecified       gentamicin 0.1 % cream    GARAMYCIN    30 g    Apply topically daily To right leg ulcer.    Ulcer of right lower leg, with fat layer exposed (H), Chronic venous hypertension with ulcer involving right side (H), Type 2 diabetes, controlled, with neuropathy (H)       insulin glargine 100 UNIT/ML injection    LANTUS SOLOSTAR    3 mL    Inject 16 Units Subcutaneous daily (with dinner) May take up to 22 units daily    Type 2 diabetes mellitus with diabetic peripheral angiopathy without gangrene, with long-term current use of insulin (H)       insulin pen needle 31G X 8 MM    B-D U/F    100 each    Use 1 daily as directed    Diabetes mellitus, type II (H)        "lisinopril 10 MG tablet    PRINIVIL/ZESTRIL    90 tablet    Take 1 tablet (10 mg) by mouth daily    Benign essential hypertension       * nateglinide 120 MG tablet    STARLIX    90 tablet    TAKE 1 TABLET BY MOUTH THREE TIMES DAILY BEFORE MEALS    Type 2 diabetes, controlled, with neuropathy (H)       * nateglinide 120 MG tablet    STARLIX    90 tablet    Take 1 tablet (120 mg) by mouth 3 times daily (before meals)    Diabetes mellitus (H)       ONETOUCH ULTRA test strip   Generic drug:  blood glucose monitoring     200 strip    USE TO TEST TWICE DAILY OR AS DIRECTED    Type 2 diabetes mellitus (H)       OPTIFOAM 6\"X6\" Pads     1 each    1 Box once a week    Ulcer of right leg, with fat layer exposed (H)       order for DME     2 each    Please measure and distribute 1 pair of 20mm Hg - 30mm Hg knee high ULCER CARE open or closed toe compression stockings.  Patient has a size 13 foot and please take this into consideration.  Jobst or equivalent    Varicose veins of lower extremities with other complications, Venous stasis ulcer of right lower extremity (H)       order for DME     3 each    Please measure and distribute 1 pair of 20mmHg - 30mmHg knee high open or closed toe compression stockings. Jobst ultrasheer or equivalent.    Varicose veins of both lower extremities with complications       order for DME     2 each    Please measure and distribute 1 pair of 30mmHg - 40mmHg knee high open toe ulcercare compression stockings. Jobst ultrasheer or equivalent.    Varicose veins of bilateral lower extremities with other complications       oxyCODONE IR 5 MG tablet    ROXICODONE    12 tablet    Take 0.5-1 tablets (2.5-5 mg) by mouth every 4 hours as needed for other (pain control or improvement in physical function. Hold dose for analgesic side effects.)    Acute post-operative pain       sildenafil 50 MG tablet    VIAGRA    10 tablet    Take 1 tablet (50 mg) by mouth daily as needed for erectile dysfunction    " Vasculogenic erectile dysfunction, unspecified vasculogenic erectile dysfunction type       silver sulfADIAZINE 1 % cream    SILVADENE    85 g    Apply topically daily To affected areas on right foot and leg.    Ulcer of right lower leg, with fat layer exposed (H), Chronic venous hypertension with ulcer involving right side (H), Type 2 diabetes, controlled, with neuropathy (H)       simvastatin 10 MG tablet    ZOCOR    90 tablet    Take 1 tablet (10 mg) by mouth At Bedtime    Type 2 diabetes, controlled, with neuropathy (H)       triamcinolone 0.1 % cream    KENALOG    60 g    Apply sparingly to left heel daily.    Dermatitis of left foot       VITAMIN C PO      Take 1,000 mg by mouth        VITAMIN D (CHOLECALCIFEROL) PO      Take 1,000 Units by mouth 2 times daily        * Notice:  This list has 4 medication(s) that are the same as other medications prescribed for you. Read the directions carefully, and ask your doctor or other care provider to review them with you.

## 2018-09-25 ENCOUNTER — OFFICE VISIT (OUTPATIENT)
Dept: PODIATRY | Facility: CLINIC | Age: 73
End: 2018-09-25
Payer: MEDICARE

## 2018-09-25 VITALS
OXYGEN SATURATION: 99 % | DIASTOLIC BLOOD PRESSURE: 65 MMHG | HEART RATE: 91 BPM | SYSTOLIC BLOOD PRESSURE: 137 MMHG | TEMPERATURE: 97.5 F

## 2018-09-25 DIAGNOSIS — L97.912 ULCER OF RIGHT LOWER EXTREMITY WITH FAT LAYER EXPOSED (H): Primary | ICD-10-CM

## 2018-09-25 DIAGNOSIS — E11.49 TYPE II OR UNSPECIFIED TYPE DIABETES MELLITUS WITH NEUROLOGICAL MANIFESTATIONS, NOT STATED AS UNCONTROLLED(250.60) (H): ICD-10-CM

## 2018-09-25 DIAGNOSIS — I87.8 VENOUS STASIS: ICD-10-CM

## 2018-09-25 PROCEDURE — 99213 OFFICE O/P EST LOW 20 MIN: CPT | Performed by: PODIATRIST

## 2018-09-25 RX ORDER — CLINDAMYCIN HCL 300 MG
300 CAPSULE ORAL 2 TIMES DAILY
Qty: 28 CAPSULE | Refills: 0 | Status: SHIPPED | OUTPATIENT
Start: 2018-09-25 | End: 2019-04-01

## 2018-09-25 ASSESSMENT — PAIN SCALES - GENERAL: PAINLEVEL: NO PAIN (1)
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
## 2018-09-25 NOTE — MR AVS SNAPSHOT
After Visit Summary   2018    Amos Walker    MRN: [id number]           Patient Information     Date Of Birth          1945        Visit Information        Provider Department      2018 2:30 PM Brayan Mcclain DPM [facility]        Today's Diagnoses     Ulcer of right lower extremity with fat layer exposed (H)    -  1    Venous stasis        Type II or unspecified type diabetes mellitus with neurological manifestations, not stated as uncontrolled(250.60) (H)          Care Instructions    Thanks for coming today.  Ortho/Sports Medicine Clinic  [address] [place] [id number]    To schedule future appointments in Ortho Clinic, you may call [phone].    To schedule ordered imaging by your provider:   Call Central Imaging [phone]    To schedule an injection ordered by your provider:  Call Central Imaging Injection scheduling line: [phone]  [employer] available online at:  [email or web address]    Please call if any further questions or concerns ([phone]).  Clinic hours 8 am to 5 pm.    Return to clinic (call) if symptoms worsen or fail to improve.            Follow-ups after your visit        Your next 10 appointments already scheduled     Oct 02, 2018  3:00 PM CDT   Return Visit with Brayan Mcclain DPM   [facility] ([facility])    [address] [place] [address] [phone]            Oct 09, 2018 12:30 PM CDT   (Arrive by 12:15 PM)   Return Vascular Visit with LEW Reynoso [facility] ([facility])    [address] [place] [id number] [phone]            Oct 09, 2018  3:00 PM CDT   Return Visit with Brayan Mcclain DPM   [facility] ([facility])    [address] [place] [id number] [phone]            Oct 16, 2018  2:30 PM CDT    Return Visit with Brayan Mcclain DPM   [facility] ([facility])    [address] [place] [address]   [phone]            Oct 23, 2018 12:30 PM CDT   Return Visit with Brayan Mcclain DPM   [facility] ([facility])    [address] [place] [address]   [phone]            Nov 05, 2018  1:40 PM CST   (Arrive by 1:25 PM)   Return Visit with Racheal Swift MD   [facility] ([facility])    [address] [place] [address]  4th [facility] [id number] [phone]            Nov 06, 2018  3:00 PM CST   Return Visit with Brayan Mcclain DPM   [facility] ([facility])    [address] [place] [id number] [phone]            Nov 13, 2018  3:00 PM CST   Return Visit with Brayan Mcclain DPM   [facility] ([facility])    [address] [place] [id number] [phone]            Nov 20, 2018  2:30 PM CST   Return Visit with Brayan Mcclain DPM   [facility] ([facility])    [address] [place] [phone]            Nov 27, 2018  2:30 PM CST   Return Visit with Brayan Mcclain DPM   [facility] ([facility])    [address] [place] [address] [phone]              Who to contact     If you have questions or need follow up information about today's clinic visit or your schedule please contact [facility] directly at [phone].  Normal or non-critical lab and imaging results will be communicated to you by MyChart, letter or phone within 4 business days after the clinic has received the results. If you do not hear from us within 7 days, please contact the clinic through MyChart or phone. If you have a critical or abnormal lab  result, we will notify you by phone as soon as possible.  Submit refill requests through [employer] or call your pharmacy and they will forward the refill request to us. Please allow 3 business days for your refill to be completed.          Additional Information About Your Visit        [employer] Information     [employer] gives you secure access to your electronic health record. If you see a primary care provider, you can also send messages to your care team and make appointments. If you have questions, please call your primary care clinic.  If you do not have a primary care provider, please call [phone] and they will assist you.      [employer] is an electronic gateway that provides easy, online access to your medical records. With [employer], you can request a clinic appointment, read your test results, renew a prescription or communicate with your care team.     To access your existing account, please contact your [facility] or call [phone] for assistance.        Care EveryWhere ID     This is your Care EveryWhere ID. This could be used by other organizations to access your [facility] medical records  [id number]        Your Vitals Were     Pulse Temperature Pulse Oximetry             91 97.5  F (36.4  C) (Oral) 99%          Blood Pressure from Last 3 Encounters:   09/25/18 137/65   09/18/18 153/69   09/15/18 133/68    Weight from Last 3 Encounters:   09/18/18 75.8 kg (167 lb)   09/15/18 76.6 kg (168 lb 12.8 oz)   09/04/18 75.9 kg (167 lb 4.8 oz)              Today, you had the following     No orders found for display         Today's Medication Changes          These changes are accurate as of 9/25/18 11:59 PM.  If you have any questions, ask your nurse or doctor.               Start taking these medicines.        Dose/Directions    clindamycin 300 MG capsule   Commonly known as:  CLEOCIN   Used for:  Ulcer of right lower extremity with fat layer exposed (H), Venous stasis, Type II or  unspecified type diabetes mellitus with neurological manifestations, not stated as uncontrolled(250.60) (H)        Dose:  300 mg   Take 1 capsule (300 mg) by mouth 2 times daily   Quantity:  28 capsule   Refills:  0         These medicines have changed or have updated prescriptions.        Dose/Directions    * clopidogrel 75 MG tablet   Commonly known as:  PLAVIX   This may have changed:  when to take this   Used for:  Peripheral vascular disease, unspecified        Dose:  75 mg   Take 1 tablet (75 mg) by mouth daily   Quantity:  30 tablet   Refills:  11       * clopidogrel 75 MG tablet   Commonly known as:  PLAVIX   This may have changed:  Another medication with the same name was changed. Make sure you understand how and when to take each.   Used for:  Peripheral vascular disease (H)        Dose:  75 mg   Take 1 tablet (75 mg) by mouth daily   Quantity:  90 tablet   Refills:  0       gentamicin 0.1 % cream   Commonly known as:  GARAMYCIN   This may have changed:    - when to take this  - reasons to take this  - additional instructions   Used for:  Ulcer of right lower leg, with fat layer exposed (H), Chronic venous hypertension with ulcer involving right side (H), Type 2 diabetes, controlled, with neuropathy (H)        Apply topically daily To right leg ulcer.   Quantity:  30 g   Refills:  5       lisinopril 10 MG tablet   Commonly known as:  PRINIVIL/ZESTRIL   This may have changed:  when to take this   Used for:  Benign essential hypertension        Dose:  10 mg   Take 1 tablet (10 mg) by mouth daily   Quantity:  90 tablet   Refills:  3       * Notice:  This list has 2 medication(s) that are the same as other medications prescribed for you. Read the directions carefully, and ask your doctor or other care provider to review them with you.         Where to get your medicines      These medications were sent to [facility] - [place], [place] - [address]  [address], [facility] [address]     Phone:  [phone]     clindamycin 300 MG capsule                Primary Care Provider Office Phone # Fax #    Racheal Swift [phone]       [address] [address]        Equal Access to Services     SAMSON MELTON : Hadii aad ku hadasho Soomaali, waaxda luqadaha, qaybta kaalmada adeegyada, waxay idiin hayaan adedamion bobyromulo laking . So [facility] [phone].    ATENCIÓN: Si habla español, tiene a rodrigues disposición servicios gratuitos de asistencia lingüística. Timmyame al [phone].    We comply with applicable federal civil rights laws and [place] laws. We do not discriminate on the basis of race, color, national origin, age, disability, sex, sexual orientation, or gender identity.            Thank you!     Thank you for choosing [facility]  for your care. Our goal is always to provide you with excellent care. Hearing back from our patients is one way we can continue to improve our services. Please take a few minutes to complete the written survey that you may receive in the mail after your visit with us. Thank you!             Your Updated Medication List - Protect others around you: Learn how to safely use, store and throw away your medicines at www.disposemymeds.org.          This list is accurate as of 9/25/18 11:59 PM.  Always use your most recent med list.                   Brand Name Dispense Instructions for use Diagnosis    ammonium lactate 12 % cream    LAC-HYDRIN    385 g    Apply topically 2 times daily as needed for dry skin    Venous stasis, Type 2 diabetes, controlled, with neuropathy (H)       ascorbic acid 500 MG Tabs     30 tablet    Take 1 tablet (500 mg) by mouth 2 times daily    Ulcer of right lower leg, with fat layer exposed (H)       ASPIRIN PO      Take 81 mg by mouth daily        blood glucose monitoring lancets     3 Box    Use to test blood sugars 2 as directed.    Type 2 diabetes, uncontrolled,  with neuropathy (H)       Blood Pressure Kit     1 kit    1 Device daily    Benign essential hypertension       clindamycin 300 MG capsule    CLEOCIN    28 capsule    Take 1 capsule (300 mg) by mouth 2 times daily    Ulcer of right lower extremity with fat layer exposed (H), Venous stasis, Type II or unspecified type diabetes mellitus with neurological manifestations, not stated as uncontrolled(250.60) (H)       * clopidogrel 75 MG tablet    PLAVIX    30 tablet    Take 1 tablet (75 mg) by mouth daily    Peripheral vascular disease, unspecified       * clopidogrel 75 MG tablet    PLAVIX    90 tablet    Take 1 tablet (75 mg) by mouth daily    Peripheral vascular disease (H)       ferrous sulfate 325 (65 Fe) MG tablet    IRON    60 tablet    Take 1 tablet (325 mg) by mouth 2 times daily    Peripheral vascular disease, unspecified       gentamicin 0.1 % cream    GARAMYCIN    30 g    Apply topically daily To right leg ulcer.    Ulcer of right lower leg, with fat layer exposed (H), Chronic venous hypertension with ulcer involving right side (H), Type 2 diabetes, controlled, with neuropathy (H)       insulin glargine 100 UNIT/ML injection    LANTUS SOLOSTAR    3 mL    Inject 16 Units Subcutaneous daily (with dinner) May take up to 22 units daily    Type 2 diabetes mellitus with diabetic peripheral angiopathy without gangrene, with long-term current use of insulin (H)       insulin pen needle 31G X 8 MM    B-D U/F    100 each    Use 1 daily as directed    Diabetes mellitus, type II (H)       lisinopril 10 MG tablet    PRINIVIL/ZESTRIL    90 tablet    Take 1 tablet (10 mg) by mouth daily    Benign essential hypertension       * nateglinide 120 MG tablet    STARLIX    90 tablet    TAKE 1 TABLET BY MOUTH THREE TIMES DAILY BEFORE MEALS    Type 2 diabetes, controlled, with neuropathy (H)       * nateglinide 120 MG tablet    STARLIX    90 tablet    Take 1 tablet (120 mg) by mouth 3 times daily (before meals)    Diabetes mellitus  "(H)       ONETOUCH ULTRA test strip   Generic drug:  blood glucose monitoring     200 strip    USE TO TEST TWICE DAILY OR AS DIRECTED    Type 2 diabetes mellitus (H)       OPTIFOAM 6\"X6\" Pads     1 each    1 Box once a week    Ulcer of right leg, with fat layer exposed (H)       order for DME     2 each    Please measure and distribute 1 pair of 20mm Hg - 30mm Hg knee high ULCER CARE open or closed toe compression stockings.  Patient has a size 13 foot and please take this into consideration.  Jobst or equivalent    Varicose veins of lower extremities with other complications, Venous stasis ulcer of right lower extremity (H)       order for DME     3 each    Please measure and distribute 1 pair of 20mmHg - 30mmHg knee high open or closed toe compression stockings. Jobst ultrasheer or equivalent.    Varicose veins of both lower extremities with complications       order for DME     2 each    Please measure and distribute 1 pair of 30mmHg - 40mmHg knee high open toe ulcercare compression stockings. Jobst ultrasheer or equivalent.    Varicose veins of bilateral lower extremities with other complications       oxyCODONE IR 5 MG tablet    ROXICODONE    12 tablet    Take 0.5-1 tablets (2.5-5 mg) by mouth every 4 hours as needed for other (pain control or improvement in physical function. Hold dose for analgesic side effects.)    Acute post-operative pain       sildenafil 50 MG tablet    VIAGRA    10 tablet    Take 1 tablet (50 mg) by mouth daily as needed for erectile dysfunction    Vasculogenic erectile dysfunction, unspecified vasculogenic erectile dysfunction type       silver sulfADIAZINE 1 % cream    SILVADENE    85 g    Apply topically daily To affected areas on right foot and leg.    Ulcer of right lower leg, with fat layer exposed (H), Chronic venous hypertension with ulcer involving right side (H), Type 2 diabetes, controlled, with neuropathy (H)       simvastatin 10 MG tablet    ZOCOR    90 tablet    Take 1 " tablet (10 mg) by mouth At Bedtime    Type 2 diabetes, controlled, with neuropathy (H)       triamcinolone 0.1 % cream    KENALOG    60 g    Apply sparingly to left heel daily.    Dermatitis of left foot       VITAMIN C PO      Take 1,000 mg by mouth        VITAMIN D (CHOLECALCIFEROL) PO      Take 1,000 Units by mouth 2 times daily        * Notice:  This list has 4 medication(s) that are the same as other medications prescribed for you. Read the directions carefully, and ask your doctor or other care provider to review them with you.

## 2018-09-25 NOTE — PATIENT INSTRUCTIONS
Thanks for coming today.  Ortho/Sports Medicine Clinic  [address] [place] [address]    To schedule future appointments in Ortho Clinic, you may call [phone].    To schedule ordered imaging by your provider:   Call Central Imaging [phone]    To schedule an injection ordered by your provider:  Call Central Imaging Injection scheduling line: [phone]  [email or web address] available online at:  [email or web address]    Please call if any further questions or concerns ([phone]).  Clinic hours 8 am to 5 pm.    Return to clinic (call) if symptoms worsen or fail to improve.
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
## 2018-09-25 NOTE — NURSING NOTE
Amos Walker's chief complaint for this visit includes:  Chief Complaint   Patient presents with     RECHECK     right leg ulcer     PCP: Racheal Swift    Referring Provider:  No referring provider defined for this encounter.    /65  Pulse 91  Temp 97.5  F (36.4  C) (Oral)  SpO2 99%  No Pain (1)     Do you need any medication refills at today's visit? no

## 2018-09-27 NOTE — PROGRESS NOTES
Pictures were placed in Pt's chart today for future reference.

## 2018-09-30 DIAGNOSIS — E11.9 DIABETES MELLITUS (H): ICD-10-CM

## 2018-10-01 RX ORDER — NATEGLINIDE 120 MG/1
TABLET ORAL
Qty: 96 TABLET | Refills: 0 | Status: SHIPPED | OUTPATIENT
Start: 2018-10-01 | End: 2018-11-13

## 2018-10-01 NOTE — TELEPHONE ENCOUNTER
Last Clinic Visit: 6/29/18   [place]: 11/5/18  FYI:RF UNTIL APPT.11/5/18  OVERDUE ALT/AST LDL(10/25/17)

## 2018-10-02 ENCOUNTER — OFFICE VISIT (OUTPATIENT)
Dept: PODIATRY | Facility: CLINIC | Age: 73
End: 2018-10-02
Payer: MEDICARE

## 2018-10-02 VITALS
SYSTOLIC BLOOD PRESSURE: 137 MMHG | OXYGEN SATURATION: 99 % | DIASTOLIC BLOOD PRESSURE: 64 MMHG | HEART RATE: 82 BPM | TEMPERATURE: 97.3 F

## 2018-10-02 DIAGNOSIS — L97.912 ULCER OF RIGHT LOWER EXTREMITY WITH FAT LAYER EXPOSED (H): Primary | ICD-10-CM

## 2018-10-02 DIAGNOSIS — E11.51 DIABETES MELLITUS WITH PERIPHERAL VASCULAR DISEASE (H): ICD-10-CM

## 2018-10-02 DIAGNOSIS — L97.521 SKIN ULCER OF LEFT FOOT, LIMITED TO BREAKDOWN OF SKIN (H): ICD-10-CM

## 2018-10-02 DIAGNOSIS — E11.49 TYPE II OR UNSPECIFIED TYPE DIABETES MELLITUS WITH NEUROLOGICAL MANIFESTATIONS, NOT STATED AS UNCONTROLLED(250.60) (H): ICD-10-CM

## 2018-10-02 DIAGNOSIS — L97.512 SKIN ULCER OF RIGHT FOOT WITH FAT LAYER EXPOSED (H): ICD-10-CM

## 2018-10-02 PROCEDURE — 99213 OFFICE O/P EST LOW 20 MIN: CPT | Performed by: PODIATRIST
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
## 2018-10-02 NOTE — MR AVS SNAPSHOT
After Visit Summary   10/2/2018    Amos Walker    MRN: [id number]           Patient Information     Date Of Birth          1945        Visit Information        Provider Department      10/2/2018 3:00 PM Brayan Mcclain DPM [facility]        Today's Diagnoses     Ulcer of right lower extremity with fat layer exposed (H)    -  1    Skin ulcer of right foot with fat layer exposed (H)        Skin ulcer of left foot, limited to breakdown of skin (H)        Type II or unspecified type diabetes mellitus with neurological manifestations, not stated as uncontrolled(250.60) (H)        Diabetes mellitus with peripheral vascular disease (H)          Care Instructions    Thanks for coming today.  Ortho/Sports Medicine Clinic  [address] [place] [address]    To schedule future appointments in Ortho Clinic, you may call [phone].    To schedule ordered imaging by your provider:   Call Central Imaging [phone]    To schedule an injection ordered by your provider:  Call Central Imaging Injection scheduling line: [phone]  [employer] available online at:  [email or web address]    Please call if any further questions or concerns ([phone]).  Clinic hours 8 am to 5 pm.    Return to clinic (call) if symptoms worsen or fail to improve.            Follow-ups after your visit        Your next 10 appointments already scheduled     Oct 09, 2018 12:30 PM CDT   (Arrive by 12:15 PM)   Return Vascular Visit with LEW Reynoso   [facility] ([facility])    [address]  [address] [facility] [address] [phone]            Oct 16, 2018  2:30 PM CDT   Return Visit with DONNELL Pena [facility] ([facility])    [address] [place] [address] [phone]            Oct 23, 2018 12:30 PM CDT   Return Visit with Brayan Mcclain DPM     [facility] ([facility])    [address] [facility] [address]   [phone]            Nov 05, 2018  1:40 PM CST   (Arrive by 1:25 PM)   Return Visit with Racheal Swift MD   [facility] ([facility])    [address]  [address] [facility] [address]   [phone]            Nov 06, 2018  3:00 PM CST   Return Visit with Brayan Mcclain DPM   [facility] ([facility])    [address] [place] [address]   [phone]            Nov 13, 2018  3:00 PM CST   Return Visit with Brayan Mcclain DPM   [facility] ([facility])    [address] [place] [address]   [phone]            Nov 20, 2018  2:30 PM CST   Return Visit with Brayan Mcclain DPM   [facility] ([facility])    [address] [place] [address]   [phone]            Nov 27, 2018  2:30 PM CST   Return Visit with Brayan Mcclain DPM   [facility] ([facility])    [address] [place] [address]   [phone]            Dec 04, 2018  3:00 PM CST   Return Visit with Brayan Mcclain DPM   [facility] ([facility])    [address] [place] [address]   [phone]            Dec 11, 2018  2:30 PM CST   Return Visit with Brayan Mcclain DPM   [facility] ([facility])    [address] [place] [address]   [phone]              Who to contact     If you have questions or need follow up information about today's clinic visit or your schedule please contact [facility] directly at [phone].  Normal or non-critical lab and imaging results will be communicated to you by MyChart, letter or phone within 4 business days after the clinic has  received the results. If you do not hear from us within 7 days, please contact the clinic through [employer] or phone. If you have a critical or abnormal lab result, we will notify you by phone as soon as possible.  Submit refill requests through [employer] or call your pharmacy and they will forward the refill request to us. Please allow 3 business days for your refill to be completed.          Additional Information About Your Visit        [employer] Information     [employer] gives you secure access to your electronic health record. If you see a primary care provider, you can also send messages to your care team and make appointments. If you have questions, please call your primary care clinic.  If you do not have a primary care provider, please call [phone] and they will assist you.      [employer] is an electronic gateway that provides easy, online access to your medical records. With [employer], you can request a clinic appointment, read your test results, renew a prescription or communicate with your care team.     To access your existing account, please contact your [facility] or call [phone] for assistance.        Care EveryWhere ID     This is your Care EveryWhere ID. This could be used by other organizations to access your [facility] [place] medical records  [id number]        Your Vitals Were     Pulse Temperature Pulse Oximetry             82 97.3  F (36.3  C) (Oral) 99%          Blood Pressure from Last 3 Encounters:   10/02/18 137/64   09/25/18 137/65   09/18/18 153/69    Weight from Last 3 Encounters:   09/18/18 75.8 kg (167 lb)   09/15/18 76.6 kg (168 lb 12.8 oz)   09/04/18 75.9 kg (167 lb 4.8 oz)              Today, you had the following     No orders found for display         Today's Medication Changes          These changes are accurate as of 10/2/18 11:59 PM.  If you have any questions, ask your nurse or doctor.               These medicines have changed or have updated  prescriptions.        Dose/Directions    * clopidogrel 75 MG tablet   Commonly known as:  PLAVIX   This may have changed:  when to take this   Used for:  Peripheral vascular disease, unspecified (H)        Dose:  75 mg   Take 1 tablet (75 mg) by mouth daily   Quantity:  30 tablet   Refills:  11       * clopidogrel 75 MG tablet   Commonly known as:  PLAVIX   This may have changed:  Another medication with the same name was changed. Make sure you understand how and when to take each.   Used for:  Peripheral vascular disease (H)        Dose:  75 mg   Take 1 tablet (75 mg) by mouth daily   Quantity:  90 tablet   Refills:  0       gentamicin 0.1 % cream   Commonly known as:  GARAMYCIN   This may have changed:    - when to take this  - reasons to take this  - additional instructions   Used for:  Ulcer of right lower leg, with fat layer exposed (H), Chronic venous hypertension with ulcer involving right side (H), Type 2 diabetes, controlled, with neuropathy (H)        Apply topically daily To right leg ulcer.   Quantity:  30 g   Refills:  5       lisinopril 10 MG tablet   Commonly known as:  PRINIVIL/ZESTRIL   This may have changed:  when to take this   Used for:  Benign essential hypertension        Dose:  10 mg   Take 1 tablet (10 mg) by mouth daily   Quantity:  90 tablet   Refills:  3       * Notice:  This list has 2 medication(s) that are the same as other medications prescribed for you. Read the directions carefully, and ask your doctor or other care provider to review them with you.             Primary Care Provider Office Phone # Fax #    Racheal Swift [phone] [address] [address]        Equal Access to Services     [facility] AH: Nataliia Bedoya, wajunitoda luqadaha, qaybta kaalmada jose d, yolanda lopez. So [facility] [phone].    ATENCIÓN: Si habla español, tiene a rodrigues disposición servicios gratuitos de asistencia  lingüística. Mary al [phone].    We comply with applicable federal civil rights laws and [place] laws. We do not discriminate on the basis of race, color, national origin, age, disability, sex, sexual orientation, or gender identity.            Thank you!     Thank you for choosing [facility]  for your care. Our goal is always to provide you with excellent care. Hearing back from our patients is one way we can continue to improve our services. Please take a few minutes to complete the written survey that you may receive in the mail after your visit with us. Thank you!             Your Updated Medication List - Protect others around you: Learn how to safely use, store and throw away your medicines at www.disposemymeds.org.          This list is accurate as of 10/2/18 11:59 PM.  Always use your most recent med list.                   Brand Name Dispense Instructions for use Diagnosis    ammonium lactate 12 % cream    LAC-HYDRIN    385 g    Apply topically 2 times daily as needed for dry skin    Venous stasis, Type 2 diabetes, controlled, with neuropathy (H)       ascorbic acid 500 MG Tabs     30 tablet    Take 1 tablet (500 mg) by mouth 2 times daily    Ulcer of right lower leg, with fat layer exposed (H)       ASPIRIN PO      Take 81 mg by mouth daily        blood glucose monitoring lancets     3 Box    Use to test blood sugars 2 as directed.    Type 2 diabetes, uncontrolled, with neuropathy (H)       Blood Pressure Kit     1 kit    1 Device daily    Benign essential hypertension       clindamycin 300 MG capsule    CLEOCIN    28 capsule    Take 1 capsule (300 mg) by mouth 2 times daily    Ulcer of right lower extremity with fat layer exposed (H), Venous stasis, Type II or unspecified type diabetes mellitus with neurological manifestations, not stated as uncontrolled(250.60) (H)       * clopidogrel 75 MG tablet    PLAVIX    30 tablet    Take 1 tablet (75 mg) by mouth daily    Peripheral  "vascular disease, unspecified (H)       * clopidogrel 75 MG tablet    PLAVIX    90 tablet    Take 1 tablet (75 mg) by mouth daily    Peripheral vascular disease (H)       ferrous sulfate 325 (65 Fe) MG tablet    IRON    60 tablet    Take 1 tablet (325 mg) by mouth 2 times daily    Peripheral vascular disease, unspecified (H)       gentamicin 0.1 % cream    GARAMYCIN    30 g    Apply topically daily To right leg ulcer.    Ulcer of right lower leg, with fat layer exposed (H), Chronic venous hypertension with ulcer involving right side (H), Type 2 diabetes, controlled, with neuropathy (H)       insulin glargine 100 UNIT/ML injection    LANTUS SOLOSTAR    3 mL    Inject 16 Units Subcutaneous daily (with dinner) May take up to 22 units daily    Type 2 diabetes mellitus with diabetic peripheral angiopathy without gangrene, with long-term current use of insulin (H)       insulin pen needle 31G X 8 MM    B-D U/F    100 each    Use 1 daily as directed    Diabetes mellitus, type II (H)       lisinopril 10 MG tablet    PRINIVIL/ZESTRIL    90 tablet    Take 1 tablet (10 mg) by mouth daily    Benign essential hypertension       * nateglinide 120 MG tablet    STARLIX    90 tablet    TAKE 1 TABLET BY MOUTH THREE TIMES DAILY BEFORE MEALS    Type 2 diabetes, controlled, with neuropathy (H)       * nateglinide 120 MG tablet    STARLIX    96 tablet    TAKE 1 TABLET BY MOUTH THREE TIMES DAILY BEFORE MEALS    Diabetes mellitus (H)       ONETOUCH ULTRA test strip   Generic drug:  blood glucose monitoring     200 strip    USE TO TEST TWICE DAILY OR AS DIRECTED    Type 2 diabetes mellitus (H)       OPTIFOAM 6\"X6\" Pads     1 each    1 Box once a week    Ulcer of right leg, with fat layer exposed (H)       order for DME     2 each    Please measure and distribute 1 pair of 20mm Hg - 30mm Hg knee high ULCER CARE open or closed toe compression stockings.  Patient has a size 13 foot and please take this into consideration.  Jobst or equivalent "    Varicose veins of lower extremities with other complications, Venous stasis ulcer of right lower extremity (H)       order for DME     3 each    Please measure and distribute 1 pair of 20mmHg - 30mmHg knee high open or closed toe compression stockings. Jobst ultrasheer or equivalent.    Varicose veins of both lower extremities with complications       order for DME     2 each    Please measure and distribute 1 pair of 30mmHg - 40mmHg knee high open toe ulcercare compression stockings. Jobst ultrasheer or equivalent.    Varicose veins of bilateral lower extremities with other complications       oxyCODONE IR 5 MG tablet    ROXICODONE    12 tablet    Take 0.5-1 tablets (2.5-5 mg) by mouth every 4 hours as needed for other (pain control or improvement in physical function. Hold dose for analgesic side effects.)    Acute post-operative pain       sildenafil 50 MG tablet    VIAGRA    10 tablet    Take 1 tablet (50 mg) by mouth daily as needed for erectile dysfunction    Vasculogenic erectile dysfunction, unspecified vasculogenic erectile dysfunction type       silver sulfADIAZINE 1 % cream    SILVADENE    85 g    Apply topically daily To affected areas on right foot and leg.    Ulcer of right lower leg, with fat layer exposed (H), Chronic venous hypertension with ulcer involving right side (H), Type 2 diabetes, controlled, with neuropathy (H)       simvastatin 10 MG tablet    ZOCOR    90 tablet    Take 1 tablet (10 mg) by mouth At Bedtime    Type 2 diabetes, controlled, with neuropathy (H)       triamcinolone 0.1 % cream    KENALOG    60 g    Apply sparingly to left heel daily.    Dermatitis of left foot       VITAMIN C PO      Take 1,000 mg by mouth        VITAMIN D (CHOLECALCIFEROL) PO      Take 1,000 Units by mouth 2 times daily        * Notice:  This list has 4 medication(s) that are the same as other medications prescribed for you. Read the directions carefully, and ask your doctor or other care provider to  review them with you.
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
## 2018-10-02 NOTE — NURSING NOTE
Amos Walker's chief complaint for this visit includes:  Chief Complaint   Patient presents with     RECHECK     right leg ulcer     PCP: Racheal Swift    Referring Provider:  No referring provider defined for this encounter.    /64  Pulse 82  Temp 97.3  F (36.3  C) (Oral)  SpO2 99%  Data Unavailable     Do you need any medication refills at today's visit? no

## 2018-10-02 NOTE — PATIENT INSTRUCTIONS
Thanks for coming today.  Ortho/Sports Medicine Clinic  [address] [place] [address]    To schedule future appointments in Ortho Clinic, you may call [phone].    To schedule ordered imaging by your provider:   Call Central Imaging [phone]    To schedule an injection ordered by your provider:  Call Central Imaging Injection scheduling line: [phone]  [employer] available online at:  [email or web address]    Please call if any further questions or concerns ([phone]).  Clinic hours 8 am to 5 pm.    Return to clinic (call) if symptoms worsen or fail to improve.

## 2018-10-08 DIAGNOSIS — I73.9 PAD (PERIPHERAL ARTERY DISEASE) (H): Primary | ICD-10-CM

## 2018-10-09 ENCOUNTER — OFFICE VISIT (OUTPATIENT)
Dept: VASCULAR SURGERY | Facility: CLINIC | Age: 73
End: 2018-10-09
Payer: MEDICARE

## 2018-10-09 ENCOUNTER — TELEPHONE (OUTPATIENT)
Dept: PODIATRY | Facility: CLINIC | Age: 73
End: 2018-10-09

## 2018-10-09 VITALS
SYSTOLIC BLOOD PRESSURE: 147 MMHG | DIASTOLIC BLOOD PRESSURE: 65 MMHG | HEART RATE: 50 BPM | TEMPERATURE: 97.9 F | OXYGEN SATURATION: 99 %

## 2018-10-09 DIAGNOSIS — I73.9 PAD (PERIPHERAL ARTERY DISEASE) (H): Primary | ICD-10-CM

## 2018-10-09 ASSESSMENT — PAIN SCALES - GENERAL: PAINLEVEL: NO PAIN (0)

## 2018-10-09 NOTE — TELEPHONE ENCOUNTER
Called to discuss apligraf and puraply coverage. Left message with spouse for patient to return call.   If patient returns call note that apligraf in 100% covered and puraply is 80% covered.   Graciela Diana, CMA

## 2018-10-09 NOTE — LETTER
10/9/2018       RE: Amos Walker  [address]  [facility] [address]     Dear Colleague,    Thank you for referring your patient, Amos Walker, to the [facility] at [facility]. Please see a copy of my visit note below.    VASCULAR SURGERY CLINIC ESTABLISHED PATIENT NOTE    HPI:    Amos Walker is a 73 year old male with past medical history remarkable for CKD (stage 3), HTN, hyperlipidemia, type 2 DM, venous insufficiency, tobacco abuse, nonhealing right lower extremity wounds and PAD who underwent a CTA demonstrating multi-level occlusive disease with critical stenoses in the right external iliac, common femoral and popliteal arteries.  On 9/14/2018, he underwent right femoral endarterectomy with bovine patch angioplasty, right SFA self-expanding stent and right popliteal artery angioplasty with Dr. Maharaj.  He returns to clinic for routine post op incision check.        SUBJECTIVE:  Patient denies any right leg/foot pain. Burning in his right medial knee has resolved.  Reports being able to walk 1/2 mile without any difficulty.  He is performing daily/ every other day right lower extremity dressing changes per Dr. Mcclain's instructions and has weekly clinic visits with Dr. Mcclain to assess wounds.  He denies any recent fevers, chills, night sweats or headaches.       OBJECTIVE:  Vital signs:  /65  Pulse 50  Temp 97.9  F (36.6  C)  SpO2 99%        Prior to Admission medications    Medication Sig Start Date End Date Taking? Authorizing Provider   ammonium lactate (LAC-HYDRIN) 12 % cream Apply topically 2 times daily as needed for dry skin 7/24/18   Brayan Mcclain DPM   Ascorbic Acid (VITAMIN C PO) Take 1,000 mg by mouth    Reported, Patient   ascorbic acid 500 MG TABS Take 1 tablet (500 mg) by mouth 2 times daily 8/30/17   Rik Saleh NP   ASPIRIN PO Take 81 mg by mouth daily    Unknown, Entered By History   blood glucose  "monitoring (FREESTYLE) lancets Use to test blood sugars 2 as directed. 12/18/15   Silvina Patiño MD   Blood Pressure KIT 1 Device daily 5/14/18   Racheal Swift MD   clindamycin (CLEOCIN) 300 MG capsule Take 1 capsule (300 mg) by mouth 2 times daily 9/25/18   Brayan Mcclain DPM   clopidogrel (PLAVIX) 75 MG tablet Take 1 tablet (75 mg) by mouth daily 8/22/18   Racheal Swift MD   clopidogrel (PLAVIX) 75 MG tablet Take 1 tablet (75 mg) by mouth daily  Patient taking differently: Take 75 mg by mouth every evening  8/30/17   Rik Saleh NP   ferrous sulfate (IRON) 325 (65 FE) MG tablet Take 1 tablet (325 mg) by mouth 2 times daily 8/30/17   Rik Saleh NP   Gauze Pads & Dressings (OPTIFOAM) 6\"X6\" PADS 1 Box once a week 9/8/14   Raman Villanueva MD   gentamicin (GARAMYCIN) 0.1 % cream Apply topically daily To right leg ulcer.  Patient taking differently: Apply topically daily as needed To right leg ulcer. 8/30/17   Rik Saleh NP   insulin glargine (LANTUS SOLOSTAR) 100 UNIT/ML pen Inject 16 Units Subcutaneous daily (with dinner) May take up to 22 units daily 6/29/18   Racheal Swift MD   insulin pen needle (B-D U/F) 31G X 8 MM Use 1 daily as directed 8/6/18   Racheal Swift MD   lisinopril (PRINIVIL/ZESTRIL) 10 MG tablet Take 1 tablet (10 mg) by mouth daily  Patient taking differently: Take 10 mg by mouth every evening  6/29/18   Racheal Swift MD   nateglinide (STARLIX) 120 MG tablet TAKE 1 TABLET BY MOUTH THREE TIMES DAILY BEFORE MEALS 10/1/18   Racheal Swift MD   nateglinide (STARLIX) 120 MG tablet TAKE 1 TABLET BY MOUTH THREE TIMES DAILY BEFORE MEALS 8/30/17   Rik Saleh NP   ONETOUCH ULTRA test strip USE TO TEST TWICE DAILY OR AS DIRECTED 4/3/18   Racheal Swift, MD   order for DME Please measure and distribute 1 pair of 30mmHg - 40mmHg knee high open toe ulcercare compression stockings. Jobst ultrasheer or equivalent. " 4/28/17   Olga Burgos APRN CNP   order for DME Please measure and distribute 1 pair of 20mmHg - 30mmHg knee high open or closed toe compression stockings. Jobst ultrasheer or equivalent. 10/29/15   Lane Jenkins MD   order for DME Please measure and distribute 1 pair of 20mm Hg - 30mm Hg knee high ULCER CARE open or closed toe compression stockings.   Patient has a size 13 foot and please take this into consideration.   Jobst or equivalent 9/28/15   Silvina Patiño MD   oxyCODONE IR (ROXICODONE) 5 MG tablet Take 0.5-1 tablets (2.5-5 mg) by mouth every 4 hours as needed for other (pain control or improvement in physical function. Hold dose for analgesic side effects.) 9/15/18   Tari Mcgee MD   sildenafil (VIAGRA) 50 MG tablet Take 1 tablet (50 mg) by mouth daily as needed for erectile dysfunction 10/31/16   Silvina Patiño MD   silver sulfADIAZINE (SILVADENE) 1 % cream Apply topically daily To affected areas on right foot and leg. 8/14/18   Brayan Mcclain DPM   simvastatin (ZOCOR) 10 MG tablet Take 1 tablet (10 mg) by mouth At Bedtime 7/23/18   Racheal Swift MD   triamcinolone (KENALOG) 0.1 % cream Apply sparingly to left heel daily. 8/14/18   Brayan Mcclain DPM   VITAMIN D, CHOLECALCIFEROL, PO Take 1,000 Units by mouth 2 times daily    Unknown, Entered By History       PHYSICAL EXAM:  NEURO/PSYCH: The patient is alert and oriented.  Appropriate.  Moves all extremities.   SKIN:  Warm and dry.  PULMONARY: non-labored breathing   EXTREMITIES: right anterior leg ulcer with granular base and some fibrous tissue in the base, no odor or necrotic tissue, 2 right distal ulcers to anterior shin wound with granulation tissue, no signs of infection  right lateral 5th metatarsal base ulcer deep into subcutaneous tissue, no erythema or odor, right foot warm, doppler signals in right DP and PT, left foot warm, doppler signals in left PT, right femoral endarterectomy incision healed  nicely, no erythema       ASSESSMENT:  73 year old male with PAD and right lower extremity non-healing wounds who is status post right femoral endarterectomy with bovine patch angioplasty, right SFA self-expanding stent and right popliteal artery angioplasty with Dr. Maharaj on 9/14/2018       Patient Active Problem List   Diagnosis     Senile nuclear sclerosis     PVD (peripheral vascular disease) (H)     HTN (hypertension)     CKD (chronic kidney disease) stage 3, GFR 30-59 ml/min (H)     Type 2 diabetes, controlled, with neuropathy (H)     Diabetes mellitus with peripheral vascular disease (H)     Fracture of neck of femur (H)     Aftercare following joint replacement [Z47.1]     Long-term (current) use of anticoagulants [Z79.01]     Status post left heart catheterization     Status post coronary angiogram     Critical lower limb ischemia     Non-healing ulcer (H)           PLAN:  - follow up ultrasound, CAROL and office visit with Dr. Maharaj in one month as previously scheduled   - continue right lower extremity wound care and weekly wound care visits per Dr. Mcclain  - continue ASA, Plavix and Zocor  - patient provided vascular surgery contact information should any questions or concerns arise prior to his follow up appointment with Dr. Maharaj.    Sincerely,    LEW Jansen CNS

## 2018-10-09 NOTE — PROGRESS NOTES
VASCULAR SURGERY CLINIC ESTABLISHED PATIENT NOTE    HPI:    Amos Walker is a 73 year old male with past medical history remarkable for CKD (stage 3), HTN, hyperlipidemia, type 2 DM, venous insufficiency, tobacco abuse, nonhealing right lower extremity wounds and PAD who underwent a CTA demonstrating multi-level occlusive disease with critical stenoses in the right external iliac, common femoral and popliteal arteries.  On 9/14/2018, he underwent right femoral endarterectomy with bovine patch angioplasty, right SFA self-expanding stent and right popliteal artery angioplasty with Dr. Maharaj.  He returns to clinic for routine post op incision check.        SUBJECTIVE:  Patient denies any right leg/foot pain. Burning in his right medial knee has resolved.  Reports being able to walk 1/2 mile without any difficulty.  He is performing daily/ every other day right lower extremity dressing changes per Dr. Mcclain's instructions and has weekly clinic visits with Dr. Mcclain to assess wounds.  He denies any recent fevers, chills, night sweats or headaches.       OBJECTIVE:  Vital signs:  /65  Pulse 50  Temp 97.9  F (36.6  C)  SpO2 99%        Prior to Admission medications    Medication Sig Start Date End Date Taking? Authorizing Provider   ammonium lactate (LAC-HYDRIN) 12 % cream Apply topically 2 times daily as needed for dry skin 7/24/18   Brayan Mcclain DPM   Ascorbic Acid (VITAMIN C PO) Take 1,000 mg by mouth    Reported, Patient   ascorbic acid 500 MG TABS Take 1 tablet (500 mg) by mouth 2 times daily 8/30/17   Rik Saleh NP   ASPIRIN PO Take 81 mg by mouth daily    Unknown, Entered By History   blood glucose monitoring (FREESTYLE) lancets Use to test blood sugars 2 as directed. 12/18/15   Silvina Patiño MD   Blood Pressure KIT 1 Device daily 5/14/18   Racheal Swift MD   clindamycin (CLEOCIN) 300 MG capsule Take 1 capsule (300 mg) by mouth 2 times daily 9/25/18   Brayan Mcclain  "DPM   clopidogrel (PLAVIX) 75 MG tablet Take 1 tablet (75 mg) by mouth daily 8/22/18   Racheal Swift MD   clopidogrel (PLAVIX) 75 MG tablet Take 1 tablet (75 mg) by mouth daily  Patient taking differently: Take 75 mg by mouth every evening  8/30/17   Rik Saleh NP   ferrous sulfate (IRON) 325 (65 FE) MG tablet Take 1 tablet (325 mg) by mouth 2 times daily 8/30/17   Rik Saleh NP   Gauze Pads & Dressings (OPTIFOAM) 6\"X6\" PADS 1 Box once a week 9/8/14   Raman Villanueva MD   gentamicin (GARAMYCIN) 0.1 % cream Apply topically daily To right leg ulcer.  Patient taking differently: Apply topically daily as needed To right leg ulcer. 8/30/17   Rik Saleh NP   insulin glargine (LANTUS SOLOSTAR) 100 UNIT/ML pen Inject 16 Units Subcutaneous daily (with dinner) May take up to 22 units daily 6/29/18   Racheal Swift MD   insulin pen needle (B-D U/F) 31G X 8 MM Use 1 daily as directed 8/6/18   Racheal Swift MD   lisinopril (PRINIVIL/ZESTRIL) 10 MG tablet Take 1 tablet (10 mg) by mouth daily  Patient taking differently: Take 10 mg by mouth every evening  6/29/18   Racheal Swift MD   nateglinide (STARLIX) 120 MG tablet TAKE 1 TABLET BY MOUTH THREE TIMES DAILY BEFORE MEALS 10/1/18   Racheal Swift MD   nateglinide (STARLIX) 120 MG tablet TAKE 1 TABLET BY MOUTH THREE TIMES DAILY BEFORE MEALS 8/30/17   Rik Saleh NP   ONETOUCH ULTRA test strip USE TO TEST TWICE DAILY OR AS DIRECTED 4/3/18   Racheal Swift MD   order for DME Please measure and distribute 1 pair of 30mmHg - 40mmHg knee high open toe ulcercare compression stockings. Jobst ultrasheer or equivalent. 4/28/17   Olga Burgos APRN CNP   order for DME Please measure and distribute 1 pair of 20mmHg - 30mmHg knee high open or closed toe compression stockings. Jobst ultrasheer or equivalent. 10/29/15   Lane Jenkins MD   order for DME Please measure and distribute 1 pair " of 20mm Hg - 30mm Hg knee high ULCER CARE open or closed toe compression stockings.   Patient has a size 13 foot and please take this into consideration.   Jobst or equivalent 9/28/15   Silvina Patiño MD   oxyCODONE IR (ROXICODONE) 5 MG tablet Take 0.5-1 tablets (2.5-5 mg) by mouth every 4 hours as needed for other (pain control or improvement in physical function. Hold dose for analgesic side effects.) 9/15/18   Tari Mcgee MD   sildenafil (VIAGRA) 50 MG tablet Take 1 tablet (50 mg) by mouth daily as needed for erectile dysfunction 10/31/16   Silvina Patiño MD   silver sulfADIAZINE (SILVADENE) 1 % cream Apply topically daily To affected areas on right foot and leg. 8/14/18   Brayan Mcclain DPM   simvastatin (ZOCOR) 10 MG tablet Take 1 tablet (10 mg) by mouth At Bedtime 7/23/18   Racheal Swift MD   triamcinolone (KENALOG) 0.1 % cream Apply sparingly to left heel daily. 8/14/18   Brayan Mcclain DPM   VITAMIN D, CHOLECALCIFEROL, PO Take 1,000 Units by mouth 2 times daily    Unknown, Entered By History       PHYSICAL EXAM:  NEURO/PSYCH: The patient is alert and oriented.  Appropriate.  Moves all extremities.   SKIN:  Warm and dry.  PULMONARY: non-labored breathing   EXTREMITIES: right anterior leg ulcer with granular base and some fibrous tissue in the base, no odor or necrotic tissue, 2 right distal ulcers to anterior shin wound with granulation tissue, no signs of infection  right lateral 5th metatarsal base ulcer deep into subcutaneous tissue, no erythema or odor, right foot warm, doppler signals in right DP and PT, left foot warm, doppler signals in left PT, right femoral endarterectomy incision healed nicely, no erythema       ASSESSMENT:  73 year old male with PAD and right lower extremity non-healing wounds who is status post right femoral endarterectomy with bovine patch angioplasty, right SFA self-expanding stent and right popliteal artery angioplasty with Dr. Maharaj on 9/14/2018        Patient Active Problem List   Diagnosis     Senile nuclear sclerosis     PVD (peripheral vascular disease) (H)     HTN (hypertension)     CKD (chronic kidney disease) stage 3, GFR 30-59 ml/min (H)     Type 2 diabetes, controlled, with neuropathy (H)     Diabetes mellitus with peripheral vascular disease (H)     Fracture of neck of femur (H)     Aftercare following joint replacement [Z47.1]     Long-term (current) use of anticoagulants [Z79.01]     Status post left heart catheterization     Status post coronary angiogram     Critical lower limb ischemia     Non-healing ulcer (H)           PLAN:  - follow up ultrasound, CAROL and office visit with Dr. Maharaj in one month as previously scheduled   - continue right lower extremity wound care and weekly wound care visits per Dr. Mcclain  - continue ASA, Plavix and Zocor  - patient provided vascular surgery contact information should any questions or concerns arise prior to his follow up appointment with Dr. Maharaj.        Christina HACKETT, CNS  Division of Vascular Surgery  [facility]    Pager [phone]  Office [phone]

## 2018-10-09 NOTE — MR AVS SNAPSHOT
After Visit Summary   10/9/2018    Amos Walker    MRN: [id number]           Patient Information     Date Of Birth          1945        Visit Information        Provider Department      10/9/2018 12:30 PM Christina Fermin APRN [facility]        Care Instructions    Continue daily/every other day right lower extremity dressing changes as instructed by Dr. Mcclain    Follow up ultrasound and office visit with Dr. Maharaj on November 8th    With questions, concerns, or to request an appointment, please call either:    Catia Lowery, Care Coordinator RN, Vascular Surgery  [phone]    Vascular Call Center  [phone]    To contact someone after 5 pm, on a weekend, or on a Holiday, please call:  [facility]  [phone], option 4 to have a member of the Vascular Surgery Service paged.          Follow-ups after your visit        Follow-up notes from your care team     Return in about 1 month (around 11/9/2018).      Your next 10 appointments already scheduled     Oct 16, 2018  2:30 PM CDT   Return Visit with Brayan Mcclain DPM   [facility])    [address] [place] [address]   [phone]            Oct 23, 2018 12:30 PM CDT   Return Visit with Brayan Mcclain DPM   [facility] ([facility])    [address] [place] [address]   [phone]            Nov 05, 2018  1:40 PM CST   (Arrive by 1:25 PM)   Return Visit with Racheal Swift MD   [facility] ([facility])    [address] [place] [address]   [phone]            Nov 06, 2018  3:00 PM CST   Return Visit with Brayan Mcclain DPM   [facility])    [address] [place] [address]   [phone]            Nov 08, 2018  1:00 PM CST   US  CAROL DOPPLER NO EXERCISE 1-2 LVLS BILAT with UCUS[facility]    [facility])    [address] [address] [phone]           How do I prepare for my exam? (Food and drink instructions) No caffeine or tobacco for 1 hour prior to exam.  What should I wear: Wear comfortable clothes.  How long does the exam take: Most ultrasounds take 30 to 60 minutes.  What should I bring: Bring a list of your medicines, including vitamins, minerals and over-the-counter drugs. It is safest to leave personal items at home.  Do I need a :  No  is needed.  What do I need to tell my doctor: Tell your doctor about any allergies you may have.  What should I do after the exam: No restrictions, You may resume normal activities.  What is this test: An ultrasound uses sound waves to make pictures of the body. Sound waves do not cause pain. The only discomfort may be the pressure of the wand against your skin or full bladder.  Who should I call with questions: If you have any questions, please call the Imaging Department where you will have your exam. Directions, parking instructions, and other information is available on our website, [email or web address].            Nov 08, 2018  2:00 PM CST   US LOWER EXTREMITY ARTERIAL DUPLEX RIGHT with [address] [facility]  ([facility])    [address] [place] [id number] [phone]           How do I prepare for my exam? (Food and drink instructions) No Food and Drink Restrictions.  How do I prepare for my exam? (Other instructions) You do not need to do anything special to prepare for your exam.  What should I wear: Wear comfortable clothes.  How long does the exam take: Most ultrasounds take 30 to 60 minutes.  What should I bring: Bring a list of your medicines, including vitamins, minerals and over-the-counter drugs. It is safest to leave personal  items at home.  Do I need a :  No  is needed.  What do I need to tell my doctor: Tell your doctor about any allergies you may have.  What should I do after the exam: No restrictions, You may resume normal activities.  What is this test: An ultrasound uses sound waves to make pictures of the body. Sound waves do not cause pain. The only discomfort may be the pressure of the wand against your skin or full bladder.  Who should I call with questions: If you have any questions, please call the Imaging Department where you will have your exam. Directions, parking instructions, and other information is available on our website, [email or web address].            Nov 08, 2018  3:40 PM CST   (Arrive by 3:25 PM)   Return Vascular Visit with Augusto Maharaj MD   [facility] ([facility])    [address] [place] [id number] [phone]            Nov 13, 2018  3:00 PM CST   Return Visit with Brayan Mcclain DPM   [facility])    [address] [place] [address]   [phone]            Nov 20, 2018  2:30 PM CST   Return Visit with Brayan Mcclain DPM   [facility])    [address] [place] [address]   [phone]            Nov 27, 2018  2:30 PM CST   Return Visit with Brayan Mcclain DPM   [facility])    [address] [place] [address]   [phone]              Future tests that were ordered for you today     Open Future Orders        Priority Expected Expires Ordered    US Lower Extremity Arterial Duplex Right Routine 10/9/2018 10/9/2019 10/9/2018    US CAROL Doppler No Exercise Routine 10/8/2018 10/8/2019 10/8/2018            Who to contact     Please call your clinic at [phone] to:    Ask questions about your health    Make or cancel  appointments    Discuss your medicines    Learn about your test results    Speak to your doctor            Additional Information About Your Visit        [employer] Information     [employer] gives you secure access to your electronic health record. If you see a primary care provider, you can also send messages to your care team and make appointments. If you have questions, please call your primary care clinic.  If you do not have a primary care provider, please call [phone] and they will assist you.      [employer] is an electronic gateway that provides easy, online access to your medical records. With [employer], you can request a clinic appointment, read your test results, renew a prescription or communicate with your care team.     To access your existing account, please contact your [facility] or call [phone] for assistance.        Care EveryWhere ID     This is your Care EveryWhere ID. This could be used by other organizations to access your [place] medical records  [id number]        Your Vitals Were     Pulse Temperature Pulse Oximetry             50 97.9  F (36.6  C) 99%          Blood Pressure from Last 3 Encounters:   10/09/18 147/65   10/02/18 137/64   09/25/18 137/65    Weight from Last 3 Encounters:   09/18/18 167 lb   09/15/18 168 lb 12.8 oz   09/04/18 167 lb 4.8 oz              Today, you had the following     No orders found for display         Today's Medication Changes          These changes are accurate as of 10/9/18  1:11 PM.  If you have any questions, ask your nurse or doctor.               These medicines have changed or have updated prescriptions.        Dose/Directions    * clopidogrel 75 MG tablet   Commonly known as:  PLAVIX   This may have changed:  when to take this   Used for:  Peripheral vascular disease, unspecified (H)        Dose:  75 mg   Take 1 tablet (75 mg) by mouth daily   Quantity:  30 tablet   Refills:  11       * clopidogrel 75 MG tablet    Commonly known as:  PLAVIX   This may have changed:  Another medication with the same name was changed. Make sure you understand how and when to take each.   Used for:  Peripheral vascular disease (H)        Dose:  75 mg   Take 1 tablet (75 mg) by mouth daily   Quantity:  90 tablet   Refills:  0       gentamicin 0.1 % cream   Commonly known as:  GARAMYCIN   This may have changed:    - when to take this  - reasons to take this  - additional instructions   Used for:  Ulcer of right lower leg, with fat layer exposed (H), Chronic venous hypertension with ulcer involving right side (H), Type 2 diabetes, controlled, with neuropathy (H)        Apply topically daily To right leg ulcer.   Quantity:  30 g   Refills:  5       lisinopril 10 MG tablet   Commonly known as:  PRINIVIL/ZESTRIL   This may have changed:  when to take this   Used for:  Benign essential hypertension        Dose:  10 mg   Take 1 tablet (10 mg) by mouth daily   Quantity:  90 tablet   Refills:  3       * Notice:  This list has 2 medication(s) that are the same as other medications prescribed for you. Read the directions carefully, and ask your doctor or other care provider to review them with you.             Primary Care Provider Office Phone # Fax #    Racheal Swift [phone]       [address] [address]        Equal Access to Services     SAMSON MELTON AH: Nataliia cortezo Soger, waaxda luqadaha, qaybta kaalmada adeegyada, yolanda lopez. So [facility] [phone].    ATENCIÓN: Si habla español, tiene a rodrigues disposición servicios gratuitos de asistencia lingüística. Llame al [phone].    We comply with applicable federal civil rights laws and [place] laws. We do not discriminate on the basis of race, color, national origin, age, disability, sex, sexual orientation, or gender identity.            Thank you!     Thank you for choosing [facility]  for your care.  Our goal is always to provide you with excellent care. Hearing back from our patients is one way we can continue to improve our services. Please take a few minutes to complete the written survey that you may receive in the mail after your visit with us. Thank you!             Your Updated Medication List - Protect others around you: Learn how to safely use, store and throw away your medicines at www.disposemymeds.org.          This list is accurate as of 10/9/18  1:11 PM.  Always use your most recent med list.                   Brand Name Dispense Instructions for use Diagnosis    ammonium lactate 12 % cream    LAC-HYDRIN    385 g    Apply topically 2 times daily as needed for dry skin    Venous stasis, Type 2 diabetes, controlled, with neuropathy (H)       ascorbic acid 500 MG Tabs     30 tablet    Take 1 tablet (500 mg) by mouth 2 times daily    Ulcer of right lower leg, with fat layer exposed (H)       ASPIRIN PO      Take 81 mg by mouth daily        blood glucose monitoring lancets     3 Box    Use to test blood sugars 2 as directed.    Type 2 diabetes, uncontrolled, with neuropathy (H)       Blood Pressure Kit     1 kit    1 Device daily    Benign essential hypertension       clindamycin 300 MG capsule    CLEOCIN    28 capsule    Take 1 capsule (300 mg) by mouth 2 times daily    Ulcer of right lower extremity with fat layer exposed (H), Venous stasis, Type II or unspecified type diabetes mellitus with neurological manifestations, not stated as uncontrolled(250.60) (H)       * clopidogrel 75 MG tablet    PLAVIX    30 tablet    Take 1 tablet (75 mg) by mouth daily    Peripheral vascular disease, unspecified (H)       * clopidogrel 75 MG tablet    PLAVIX    90 tablet    Take 1 tablet (75 mg) by mouth daily    Peripheral vascular disease (H)       ferrous sulfate 325 (65 Fe) MG tablet    IRON    60 tablet    Take 1 tablet (325 mg) by mouth 2 times daily    Peripheral vascular disease, unspecified (H)        "gentamicin 0.1 % cream    GARAMYCIN    30 g    Apply topically daily To right leg ulcer.    Ulcer of right lower leg, with fat layer exposed (H), Chronic venous hypertension with ulcer involving right side (H), Type 2 diabetes, controlled, with neuropathy (H)       insulin glargine 100 UNIT/ML injection    LANTUS SOLOSTAR    3 mL    Inject 16 Units Subcutaneous daily (with dinner) May take up to 22 units daily    Type 2 diabetes mellitus with diabetic peripheral angiopathy without gangrene, with long-term current use of insulin (H)       insulin pen needle 31G X 8 MM    B-D U/F    100 each    Use 1 daily as directed    Diabetes mellitus, type II (H)       lisinopril 10 MG tablet    PRINIVIL/ZESTRIL    90 tablet    Take 1 tablet (10 mg) by mouth daily    Benign essential hypertension       * nateglinide 120 MG tablet    STARLIX    90 tablet    TAKE 1 TABLET BY MOUTH THREE TIMES DAILY BEFORE MEALS    Type 2 diabetes, controlled, with neuropathy (H)       * nateglinide 120 MG tablet    STARLIX    96 tablet    TAKE 1 TABLET BY MOUTH THREE TIMES DAILY BEFORE MEALS    Diabetes mellitus (H)       ONETOUCH ULTRA test strip   Generic drug:  blood glucose monitoring     200 strip    USE TO TEST TWICE DAILY OR AS DIRECTED    Type 2 diabetes mellitus (H)       OPTIFOAM 6\"X6\" Pads     1 each    1 Box once a week    Ulcer of right leg, with fat layer exposed (H)       order for DME     2 each    Please measure and distribute 1 pair of 20mm Hg - 30mm Hg knee high ULCER CARE open or closed toe compression stockings.  Patient has a size 13 foot and please take this into consideration.  Jobst or equivalent    Varicose veins of lower extremities with other complications, Venous stasis ulcer of right lower extremity (H)       order for DME     3 each    Please measure and distribute 1 pair of 20mmHg - 30mmHg knee high open or closed toe compression stockings. Jobst ultrasheer or equivalent.    Varicose veins of both lower extremities " with complications       order for DME     2 each    Please measure and distribute 1 pair of 30mmHg - 40mmHg knee high open toe ulcercare compression stockings. Jobst ultrasheer or equivalent.    Varicose veins of bilateral lower extremities with other complications       oxyCODONE IR 5 MG tablet    ROXICODONE    12 tablet    Take 0.5-1 tablets (2.5-5 mg) by mouth every 4 hours as needed for other (pain control or improvement in physical function. Hold dose for analgesic side effects.)    Acute post-operative pain       sildenafil 50 MG tablet    VIAGRA    10 tablet    Take 1 tablet (50 mg) by mouth daily as needed for erectile dysfunction    Vasculogenic erectile dysfunction, unspecified vasculogenic erectile dysfunction type       silver sulfADIAZINE 1 % cream    SILVADENE    85 g    Apply topically daily To affected areas on right foot and leg.    Ulcer of right lower leg, with fat layer exposed (H), Chronic venous hypertension with ulcer involving right side (H), Type 2 diabetes, controlled, with neuropathy (H)       simvastatin 10 MG tablet    ZOCOR    90 tablet    Take 1 tablet (10 mg) by mouth At Bedtime    Type 2 diabetes, controlled, with neuropathy (H)       triamcinolone 0.1 % cream    KENALOG    60 g    Apply sparingly to left heel daily.    Dermatitis of left foot       VITAMIN C PO      Take 1,000 mg by mouth        VITAMIN D (CHOLECALCIFEROL) PO      Take 1,000 Units by mouth 2 times daily        * Notice:  This list has 4 medication(s) that are the same as other medications prescribed for you. Read the directions carefully, and ask your doctor or other care provider to review them with you.

## 2018-10-09 NOTE — NURSING NOTE
Vascular Rooming Note    Amos Walker's goals for this visit include:   Chief Complaint   Patient presents with     WOUND CARE     Amos is here today for a 2-3 week wound follow up      SHANNEN Yun

## 2018-10-09 NOTE — PATIENT INSTRUCTIONS
Continue daily/every other day right lower extremity dressing changes as instructed by Dr. Mcclain    Follow up ultrasound and office visit with Dr. Maharaj on November 8th    With questions, concerns, or to request an appointment, please call either:    Catia Lowery, Care Coordinator RN, Vascular Surgery  [phone]    Vascular Call Center  [phone]    To contact someone after 5 pm, on a weekend, or on a Holiday, please call:  [facility]  [phone], option 4 to have a member of the Vascular Surgery Service paged.

## 2018-10-10 NOTE — PATIENT INSTRUCTIONS
Thanks for coming today.  Ortho[facility] Medicine Clinic  [address] [place] [address]    To schedule future appointments in Ortho Clinic, you may call [phone].    To schedule ordered imaging by your provider:   Call Central Imaging [phone]    To schedule an injection ordered by your provider:  Call Central Imaging Injection scheduling line: [phone]  [employer] available online at:  [email or web address]    Please call if any further questions or concerns ([phone]).  Clinic hours 8 am to 5 pm.    Return to clinic (call) if symptoms worsen or fail to improve.     no

## 2018-10-11 NOTE — TELEPHONE ENCOUNTER
Spoke with patient about coverage on both apligraf and PuraPly. He gave me permission to speak with his wife,Danielle, about the coverage information. I spoke with patients wife and explained the coverage check. She was informed that the visit is charged as an office visit. Danielle expressed understanding of what was covered and what was not. I told Danielle that I would have some information on both the apligraf and puraply ready for amelia to take home on Tuesday from his appointment.    Graciela Diana, CMA

## 2018-10-16 ENCOUNTER — OFFICE VISIT (OUTPATIENT)
Dept: PODIATRY | Facility: CLINIC | Age: 73
End: 2018-10-16
Payer: MEDICARE

## 2018-10-16 VITALS
DIASTOLIC BLOOD PRESSURE: 63 MMHG | OXYGEN SATURATION: 99 % | TEMPERATURE: 97.8 F | SYSTOLIC BLOOD PRESSURE: 124 MMHG | HEART RATE: 91 BPM

## 2018-10-16 DIAGNOSIS — L97.912 CHRONIC ULCER OF RIGHT LOWER EXTREMITY WITH FAT LAYER EXPOSED (H): Primary | ICD-10-CM

## 2018-10-16 DIAGNOSIS — L97.512 SKIN ULCER OF RIGHT FOOT WITH FAT LAYER EXPOSED (H): ICD-10-CM

## 2018-10-16 DIAGNOSIS — I87.8 VENOUS STASIS: ICD-10-CM

## 2018-10-16 DIAGNOSIS — E11.49 TYPE II OR UNSPECIFIED TYPE DIABETES MELLITUS WITH NEUROLOGICAL MANIFESTATIONS, NOT STATED AS UNCONTROLLED(250.60) (H): ICD-10-CM

## 2018-10-16 PROCEDURE — 97597 DBRDMT OPN WND 1ST 20 CM/<: CPT | Performed by: PODIATRIST
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
## 2018-10-16 NOTE — MR AVS SNAPSHOT
After Visit Summary   10/16/2018    Amos Walker    MRN: [id number]           Patient Information     Date Of Birth          1945        Visit Information        Provider Department      10/16/2018 2:30 PM Brayan Mcclain DPM [facility]        Today's Diagnoses     Chronic ulcer of right lower extremity with fat layer exposed (H)    -  1    Skin ulcer of right foot with fat layer exposed (H)        Type II or unspecified type diabetes mellitus with neurological manifestations, not stated as uncontrolled(250.60) (H)        Venous stasis           Follow-ups after your visit        Your next 10 appointments already scheduled     Oct 23, 2018 12:30 PM CDT   Return Visit with Brayan Mcclain DPM   [facility]    [id number] [place] [address] [phone]            Nov 05, 2018  1:40 PM CST   (Arrive by 1:25 PM)   Return Visit with Racheal Swift MD   [facility] ([facility])    [address] [place] [address]  [address] [facility] [id number] [phone]            Nov 06, 2018  3:00 PM CST   Return Visit with Brayan Mcclain DPM   [facility] ([facility])    [address] [place] [address] [id number] [phone]            Nov 08, 2018  1:00 PM CST   US CAROL DOPPLER NO EXERCISE 1-2 LVLS BILAT with UCUSV2   [facility] ([facility])    [address] [place] [address] [id number] [phone]           How do I prepare for my exam? (Food and drink instructions) No caffeine or tobacco for 1 hour prior to exam.  What should I wear: Wear comfortable clothes.  How long does the exam take: Most ultrasounds take 30 to 60 minutes.  What should I bring: Bring a list of your medicines, including vitamins, minerals and over-the-counter drugs. It is safest to leave personal items at  home.  Do I need a :  No  is needed.  What do I need to tell my doctor: Tell your doctor about any allergies you may have.  What should I do after the exam: No restrictions, You may resume normal activities.  What is this test: An ultrasound uses sound waves to make pictures of the body. Sound waves do not cause pain. The only discomfort may be the pressure of the wand against your skin or full bladder.  Who should I call with questions: If you have any questions, please call the Imaging Department where you will have your exam. Directions, parking instructions, and other information is available on our website, [email or web address].            Nov 08, 2018  2:00 PM CST   US LOWER EXTREMITY ARTERIAL DUPLEX RIGHT with UCUSV2   [facility])    [address] [address] [phone]           How do I prepare for my exam? (Food and drink instructions) No Food and Drink Restrictions.  How do I prepare for my exam? (Other instructions) You do not need to do anything special to prepare for your exam.  What should I wear: Wear comfortable clothes.  How long does the exam take: Most ultrasounds take 30 to 60 minutes.  What should I bring: Bring a list of your medicines, including vitamins, minerals and over-the-counter drugs. It is safest to leave personal items at home.  Do I need a :  No  is needed.  What do I need to tell my doctor: Tell your doctor about any allergies you may have.  What should I do after the exam: No restrictions, You may resume normal activities.  What is this test: An ultrasound uses sound waves to make pictures of the body. Sound waves do not cause pain. The only discomfort may be the pressure of the wand against your skin or full bladder.  Who should I call with questions: If you have any questions, please call the Imaging Department where you will have your exam. Directions, parking  instructions, and other information is available on our website, [email or web address].            Nov 08, 2018  3:40 PM CST   (Arrive by 3:25 PM)   Return Vascular Visit with Augusto Maharaj MD   [facility] ([facility])    [address] [address]   [phone]            Nov 13, 2018  3:00 PM CST   Return Visit with Brayan Mcclain DPM   [facility]    [address] [place] [address] [phone]            Nov 20, 2018  2:30 PM CST   Return Visit with Brayan Mcclain DPM   [facility] ([facility])    [address] [place] [address] 20791-[address] [phone]            Nov 27, 2018  2:30 PM CST   Return Visit with Brayan Mcclain DPM   [facility] ([facility])    [address] [place] [address] [phone]            Dec 04, 2018  3:00 PM CST   Return Visit with Brayan Mcclain DPM   [facility] ([facility])    [address] [place] [address] [phone]              Who to contact     If you have questions or need follow up information about today's clinic visit or your schedule please contact [facility] directly at [phone].  Normal or non-critical lab and imaging results will be communicated to you by MyChart, letter or phone within 4 business days after the clinic has received the results. If you do not hear from us within 7 days, please contact the clinic through [facility]and[facility] or phone. If you have a critical or abnormal lab result, we will notify you by phone as soon as possible.  Submit refill requests through [employer] or call your pharmacy and they will forward the refill request to us. Please allow 3 business days for your refill to be completed.          Additional Information About Your Visit         [email or web address] Information     [employer] gives you secure access to your electronic health record. If you see a primary care provider, you can also send messages to your care team and make appointments. If you have questions, please call your primary care clinic.  If you do not have a primary care provider, please call [phone] and they will assist you.      [employer] is an electronic gateway that provides easy, online access to your medical records. With [employer], you can request a clinic appointment, read your test results, renew a prescription or communicate with your care team.     To access your existing account, please contact your [facility] or call [phone] for assistance.        Care EveryWhere ID     This is your Care EveryWhere ID. This could be used by other organizations to access your [facility] medical records  [id number]        Your Vitals Were     Pulse Temperature Pulse Oximetry             91 97.8  F (36.6  C) (Oral) 99%          Blood Pressure from Last 3 Encounters:   10/16/18 124/63   10/09/18 147/65   10/02/18 137/64    Weight from Last 3 Encounters:   09/18/18 75.8 kg (167 lb)   09/15/18 76.6 kg (168 lb 12.8 oz)   09/04/18 75.9 kg (167 lb 4.8 oz)              We Performed the Following     DEBRIDEMENT WOUND UP TO 20 SQ CM          Today's Medication Changes          These changes are accurate as of 10/16/18 11:59 PM.  If you have any questions, ask your nurse or doctor.               These medicines have changed or have updated prescriptions.        Dose/Directions    * clopidogrel 75 MG tablet   Commonly known as:  PLAVIX   This may have changed:  when to take this   Used for:  Peripheral vascular disease, unspecified (H)        Dose:  75 mg   Take 1 tablet (75 mg) by mouth daily   Quantity:  30 tablet   Refills:  11       * clopidogrel 75 MG tablet   Commonly known as:  PLAVIX   This may have changed:  Another medication with the same name was changed. Make sure  you understand how and when to take each.   Used for:  Peripheral vascular disease (H)        Dose:  75 mg   Take 1 tablet (75 mg) by mouth daily   Quantity:  90 tablet   Refills:  0       gentamicin 0.1 % cream   Commonly known as:  GARAMYCIN   This may have changed:    - when to take this  - reasons to take this  - additional instructions   Used for:  Ulcer of right lower leg, with fat layer exposed (H), Chronic venous hypertension with ulcer involving right side (H), Type 2 diabetes, controlled, with neuropathy (H)        Apply topically daily To right leg ulcer.   Quantity:  30 g   Refills:  5       lisinopril 10 MG tablet   Commonly known as:  PRINIVIL/ZESTRIL   This may have changed:  when to take this   Used for:  Benign essential hypertension        Dose:  10 mg   Take 1 tablet (10 mg) by mouth daily   Quantity:  90 tablet   Refills:  3       * Notice:  This list has 2 medication(s) that are the same as other medications prescribed for you. Read the directions carefully, and ask your doctor or other care provider to review them with you.             Primary Care Provider Office Phone # Fax #    Racheal Swift [phone] [address] [address]        Equal Access to Services     ROSA ELENA MELTON : Hadii niurka Bedoya, waaxda luqgayatri, qaybta kaalmada jose d, yolanda lopez. So [facility] [phone].    ATENCIÓN: Si habla español, tiene a rodrigues disposición servicios gratuitos de asistencia lingüística. [facility] [phone].    We comply with applicable federal civil rights laws and [place] laws. We do not discriminate on the basis of race, color, national origin, age, disability, sex, sexual orientation, or gender identity.            Thank you!     Thank you for choosing [facility]  for your care. Our goal is always to provide you with excellent care. Hearing back from our patients is one way we can continue  to improve our services. Please take a few minutes to complete the written survey that you may receive in the mail after your visit with us. Thank you!             Your Updated Medication List - Protect others around you: Learn how to safely use, store and throw away your medicines at www.disposemymeds.org.          This list is accurate as of 10/16/18 11:59 PM.  Always use your most recent med list.                   Brand Name Dispense Instructions for use Diagnosis    ammonium lactate 12 % cream    LAC-HYDRIN    385 g    Apply topically 2 times daily as needed for dry skin    Venous stasis, Type 2 diabetes, controlled, with neuropathy (H)       ascorbic acid 500 MG Tabs     30 tablet    Take 1 tablet (500 mg) by mouth 2 times daily    Ulcer of right lower leg, with fat layer exposed (H)       ASPIRIN PO      Take 81 mg by mouth daily        blood glucose monitoring lancets     3 Box    Use to test blood sugars 2 as directed.    Type 2 diabetes, uncontrolled, with neuropathy (H)       Blood Pressure Kit     1 kit    1 Device daily    Benign essential hypertension       clindamycin 300 MG capsule    CLEOCIN    28 capsule    Take 1 capsule (300 mg) by mouth 2 times daily    Ulcer of right lower extremity with fat layer exposed (H), Venous stasis, Type II or unspecified type diabetes mellitus with neurological manifestations, not stated as uncontrolled(250.60) (H)       * clopidogrel 75 MG tablet    PLAVIX    30 tablet    Take 1 tablet (75 mg) by mouth daily    Peripheral vascular disease, unspecified (H)       * clopidogrel 75 MG tablet    PLAVIX    90 tablet    Take 1 tablet (75 mg) by mouth daily    Peripheral vascular disease (H)       ferrous sulfate 325 (65 Fe) MG tablet    IRON    60 tablet    Take 1 tablet (325 mg) by mouth 2 times daily    Peripheral vascular disease, unspecified (H)       gentamicin 0.1 % cream    GARAMYCIN    30 g    Apply topically daily To right leg ulcer.    Ulcer of right lower leg,  "with fat layer exposed (H), Chronic venous hypertension with ulcer involving right side (H), Type 2 diabetes, controlled, with neuropathy (H)       insulin glargine 100 UNIT/ML injection    LANTUS SOLOSTAR    3 mL    Inject 16 Units Subcutaneous daily (with dinner) May take up to 22 units daily    Type 2 diabetes mellitus with diabetic peripheral angiopathy without gangrene, with long-term current use of insulin (H)       insulin pen needle 31G X 8 MM    B-D U/F    100 each    Use 1 daily as directed    Diabetes mellitus, type II (H)       lisinopril 10 MG tablet    PRINIVIL/ZESTRIL    90 tablet    Take 1 tablet (10 mg) by mouth daily    Benign essential hypertension       * nateglinide 120 MG tablet    STARLIX    90 tablet    TAKE 1 TABLET BY MOUTH THREE TIMES DAILY BEFORE MEALS    Type 2 diabetes, controlled, with neuropathy (H)       * nateglinide 120 MG tablet    STARLIX    96 tablet    TAKE 1 TABLET BY MOUTH THREE TIMES DAILY BEFORE MEALS    Diabetes mellitus (H)       ONETOUCH ULTRA test strip   Generic drug:  blood glucose monitoring     200 strip    USE TO TEST TWICE DAILY OR AS DIRECTED    Type 2 diabetes mellitus (H)       OPTIFOAM 6\"X6\" Pads     1 each    1 Box once a week    Ulcer of right leg, with fat layer exposed (H)       order for DME     2 each    Please measure and distribute 1 pair of 20mm Hg - 30mm Hg knee high ULCER CARE open or closed toe compression stockings.  Patient has a size 13 foot and please take this into consideration.  Jobst or equivalent    Varicose veins of lower extremities with other complications, Venous stasis ulcer of right lower extremity (H)       order for DME     3 each    Please measure and distribute 1 pair of 20mmHg - 30mmHg knee high open or closed toe compression stockings. Jobst ultrasheer or equivalent.    Varicose veins of both lower extremities with complications       order for DME     2 each    Please measure and distribute 1 pair of 30mmHg - 40mmHg knee high " open toe ulcercare compression stockings. Jobst ultrasheer or equivalent.    Varicose veins of bilateral lower extremities with other complications       oxyCODONE IR 5 MG tablet    ROXICODONE    12 tablet    Take 0.5-1 tablets (2.5-5 mg) by mouth every 4 hours as needed for other (pain control or improvement in physical function. Hold dose for analgesic side effects.)    Acute post-operative pain       sildenafil 50 MG tablet    VIAGRA    10 tablet    Take 1 tablet (50 mg) by mouth daily as needed for erectile dysfunction    Vasculogenic erectile dysfunction, unspecified vasculogenic erectile dysfunction type       silver sulfADIAZINE 1 % cream    SILVADENE    85 g    Apply topically daily To affected areas on right foot and leg.    Ulcer of right lower leg, with fat layer exposed (H), Chronic venous hypertension with ulcer involving right side (H), Type 2 diabetes, controlled, with neuropathy (H)       simvastatin 10 MG tablet    ZOCOR    90 tablet    Take 1 tablet (10 mg) by mouth At Bedtime    Type 2 diabetes, controlled, with neuropathy (H)       triamcinolone 0.1 % cream    KENALOG    60 g    Apply sparingly to left heel daily.    Dermatitis of left foot       VITAMIN C PO      Take 1,000 mg by mouth        VITAMIN D (CHOLECALCIFEROL) PO      Take 1,000 Units by mouth 2 times daily        * Notice:  This list has 4 medication(s) that are the same as other medications prescribed for you. Read the directions carefully, and ask your doctor or other care provider to review them with you.

## 2018-10-16 NOTE — NURSING NOTE
Amso Walker's chief complaint for this visit includes:  Chief Complaint   Patient presents with     RECHECK     rt leg ulcer     PCP: Racheal Swift    Referring Provider:  No referring provider defined for this encounter.    /63  Pulse 91  Temp 97.8  F (36.6  C) (Oral)  SpO2 99%  Data Unavailable     Do you need any medication refills at today's visit? no
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
## 2018-10-17 ENCOUNTER — MYC REFILL (OUTPATIENT)
Dept: INTERNAL MEDICINE | Facility: CLINIC | Age: 73
End: 2018-10-17

## 2018-10-17 DIAGNOSIS — I10 BENIGN ESSENTIAL HYPERTENSION: ICD-10-CM

## 2018-10-17 DIAGNOSIS — E11.40 TYPE 2 DIABETES, CONTROLLED, WITH NEUROPATHY (H): ICD-10-CM

## 2018-10-17 RX ORDER — SIMVASTATIN 10 MG
10 TABLET ORAL AT BEDTIME
Qty: 90 TABLET | Refills: 3 | Status: CANCELLED | OUTPATIENT
Start: 2018-10-17

## 2018-10-17 RX ORDER — LISINOPRIL 10 MG/1
10 TABLET ORAL DAILY
Qty: 90 TABLET | Refills: 3 | Status: CANCELLED | OUTPATIENT
Start: 2018-10-17

## 2018-10-18 DIAGNOSIS — I10 ESSENTIAL HYPERTENSION WITH GOAL BLOOD PRESSURE LESS THAN 140/90: ICD-10-CM

## 2018-10-18 NOTE — TELEPHONE ENCOUNTER
Message from Gabriela:  Laverne Brush, RN u Oct 18, 2018 9:24 AM        ----- Message -----   From: Amos Walker   Sent: 10/17/2018 2:06 PM   To: [facility] Nursing Staff-  Subject: Medication Renewal Request     Original authorizing provider: MD Amos Jimenez would like a refill of the following medications:  lisinopril (PRINIVIL/ZESTRIL) 10 MG tablet [Racheal Swift MD]  simvastatin (ZOCOR) 10 MG tablet [Racheal Swift MD]    Preferred pharmacy: [facility] [address] [place] [address] AVE [address] AT [address]    Comment:

## 2018-10-19 RX ORDER — LISINOPRIL 40 MG/1
TABLET ORAL
Qty: 90 TABLET | Refills: 0 | OUTPATIENT
Start: 2018-10-19

## 2018-10-23 ENCOUNTER — OFFICE VISIT (OUTPATIENT)
Dept: PODIATRY | Facility: CLINIC | Age: 73
End: 2018-10-23
Payer: MEDICARE

## 2018-10-23 VITALS
OXYGEN SATURATION: 100 % | TEMPERATURE: 97.4 F | DIASTOLIC BLOOD PRESSURE: 68 MMHG | HEART RATE: 87 BPM | SYSTOLIC BLOOD PRESSURE: 137 MMHG

## 2018-10-23 DIAGNOSIS — L97.912 ULCER OF RIGHT LOWER EXTREMITY WITH FAT LAYER EXPOSED (H): Primary | ICD-10-CM

## 2018-10-23 DIAGNOSIS — E11.51 DIABETES MELLITUS WITH PERIPHERAL VASCULAR DISEASE (H): ICD-10-CM

## 2018-10-23 DIAGNOSIS — E11.49 TYPE II OR UNSPECIFIED TYPE DIABETES MELLITUS WITH NEUROLOGICAL MANIFESTATIONS, NOT STATED AS UNCONTROLLED(250.60) (H): ICD-10-CM

## 2018-10-23 PROCEDURE — 15271 SKIN SUB GRAFT TRNK/ARM/LEG: CPT | Performed by: PODIATRIST

## 2018-10-23 ASSESSMENT — PAIN SCALES - GENERAL: PAINLEVEL: NO PAIN (1)
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
## 2018-10-26 NOTE — NURSING NOTE
Chief Complaint   Patient presents with     Diabetes     pt here for diabetes check     Annel Das CMA at 11:34 AM on 6/16/2017.    
For information on Fall & Injury Prevention, visit [email or web address]

## 2018-11-05 ENCOUNTER — OFFICE VISIT (OUTPATIENT)
Dept: INTERNAL MEDICINE | Facility: CLINIC | Age: 73
End: 2018-11-05
Payer: MEDICARE

## 2018-11-05 VITALS
WEIGHT: 166 LBS | DIASTOLIC BLOOD PRESSURE: 67 MMHG | HEART RATE: 83 BPM | OXYGEN SATURATION: 98 % | TEMPERATURE: 97.9 F | SYSTOLIC BLOOD PRESSURE: 144 MMHG | BODY MASS INDEX: 21.31 KG/M2

## 2018-11-05 DIAGNOSIS — L98.499 CHRONIC SKIN ULCER, UNSPECIFIED ULCER STAGE (H): ICD-10-CM

## 2018-11-05 DIAGNOSIS — Z13.89 SCREENING FOR DIABETIC PERIPHERAL NEUROPATHY: ICD-10-CM

## 2018-11-05 DIAGNOSIS — Z23 NEED FOR PROPHYLACTIC VACCINATION AND INOCULATION AGAINST INFLUENZA: ICD-10-CM

## 2018-11-05 DIAGNOSIS — I73.9 PVD (PERIPHERAL VASCULAR DISEASE) (H): ICD-10-CM

## 2018-11-05 DIAGNOSIS — D62 ANEMIA DUE TO BLOOD LOSS, ACUTE: ICD-10-CM

## 2018-11-05 DIAGNOSIS — I10 ESSENTIAL HYPERTENSION: Primary | ICD-10-CM

## 2018-11-05 DIAGNOSIS — E11.40 TYPE 2 DIABETES, CONTROLLED, WITH NEUROPATHY (H): ICD-10-CM

## 2018-11-05 ASSESSMENT — PAIN SCALES - GENERAL: PAINLEVEL: MILD PAIN (2)

## 2018-11-05 NOTE — MR AVS SNAPSHOT
After Visit Summary   11/5/2018    Amos Walker    MRN: [id number]           Patient Information     Date Of Birth          1945        Visit Information        Provider Department      11/5/2018 1:40 PM Racheal Swift MD [facility]        Today's Diagnoses     Essential hypertension    -  1    Screening for diabetic peripheral neuropathy        Need for prophylactic vaccination and inoculation against influenza        PVD (peripheral vascular disease) (H)        Type 2 diabetes, controlled, with neuropathy (H)        Chronic skin ulcer, unspecified ulcer stage (H)        Anemia due to blood loss, acute          Care Instructions    Primary Care Center Medication Refill Request Information:  * Please contact your pharmacy regarding ANY request for medication refills.  ** [facility] Prescription Fax = [phone]  * Please allow 3 business days for routine medication refills.  * Please allow 5 business days for controlled substance medication refills.     Primary Care Center Test Result notification information:  *You will be notified with in 7-10 days of your appointment day regarding the results of your test.  If you are on MyChart you will be notified as soon as the provider has reviewed the results and signed off on them.    Primary Care Center: [phone]           Follow-ups after your visit        Follow-up notes from your care team     Return in about 8 weeks (around 12/31/2018) for Routine Visit.      Your next 10 appointments already scheduled     Nov 06, 2018  3:00 PM CST   Return Visit with Brayan Mcclain DPM   [facility]    [address] [place] [address] [phone]            Nov 08, 2018  1:00 PM CST   US CAROL DOPPLER NO EXERCISE 1-2 LVLS BILAT with UCUSV2   [facility] ([facility])    [address] [facility] [address]  1st Floor  [facility] [address]    [phone]           How do I prepare for my exam? (Food and drink instructions) No caffeine or tobacco for 1 hour prior to exam.  What should I wear: Wear comfortable clothes.  How long does the exam take: Most ultrasounds take 30 to 60 minutes.  What should I bring: Bring a list of your medicines, including vitamins, minerals and over-the-counter drugs. It is safest to leave personal items at home.  Do I need a :  No  is needed.  What do I need to tell my doctor: Tell your doctor about any allergies you may have.  What should I do after the exam: No restrictions, You may resume normal activities.  What is this test: An ultrasound uses sound waves to make pictures of the body. Sound waves do not cause pain. The only discomfort may be the pressure of the wand against your skin or full bladder.  Who should I call with questions: If you have any questions, please call the Imaging Department where you will have your exam. Directions, parking instructions, and other information is available on our website, [email or web address].            Nov 08, 2018  2:00 PM CST   US LOWER EXTREMITY ARTERIAL DUPLEX RIGHT with UCUSV2   [facility]  ([facility])    [address] [address] [phone]           How do I prepare for my exam? (Food and drink instructions) No Food and Drink Restrictions.  How do I prepare for my exam? (Other instructions) You do not need to do anything special to prepare for your exam.  What should I wear: Wear comfortable clothes.  How long does the exam take: Most ultrasounds take 30 to 60 minutes.  What should I bring: Bring a list of your medicines, including vitamins, minerals and over-the-counter drugs. It is safest to leave personal items at home.  Do I need a :  No  is needed.  What do I need to tell my doctor: Tell your doctor about any allergies you may have.  What should I do after the exam:  No restrictions, You may resume normal activities.  What is this test: An ultrasound uses sound waves to make pictures of the body. Sound waves do not cause pain. The only discomfort may be the pressure of the wand against your skin or full bladder.  Who should I call with questions: If you have any questions, please call the Imaging Department where you will have your exam. Directions, parking instructions, and other information is available on our website, [email or web address].            Nov 08, 2018  3:40 PM CST   (Arrive by 3:25 PM)   Return Vascular Visit with Augusto Maharaj MD   [facility] ([facility])    [address] [place] [address]  [address]rd [facility] [address]   [phone]            Nov 13, 2018  3:00 PM CST   Return Visit with Brayan Mcclain DPM   [facility])    [address] [place] [address]   [phone]            Nov 20, 2018  2:30 PM CST   Return Visit with Brayan Mcclain DPM   [facility])    [address] [place] [address]   [phone]            Nov 27, 2018  2:30 PM CST   Return Visit with Brayan Mcclain DPM   [facility]    [address] [facility] [address]   [phone]            Dec 04, 2018  3:00 PM CST   Return Visit with Brayan Mcclain DPM   [facility]    [address] [facility] [address]   [phone]            Dec 11, 2018  2:30 PM CST   Return Visit with Brayan Mcclain DPM   [facility])    [address] [facility] [address]   [phone]            Dec 18, 2018  2:30 PM CST   Return Visit with Brayan Mcclain DPM   [facility])    [address] [place] [address]  [address]   [phone]              Future tests that were ordered for you today     Open Future Orders        Priority Expected Expires Ordered    Hemoglobin A1c **(2 WKS) Routine 11/19/2018 11/5/2019 11/5/2018    Lipid panel reflex to direct LDL Fasting Routine 11/19/2018 11/5/2019 11/5/2018    CBC with platelets **(2 WKS) Routine 11/19/2018 11/5/2019 11/5/2018            Who to contact     Please call your clinic at [phone] to:    Ask questions about your health    Make or cancel appointments    Discuss your medicines    Learn about your test results    Speak to your doctor            Additional Information About Your Visit        [employer] Information     [employer] gives you secure access to your electronic health record. If you see a primary care provider, you can also send messages to your care team and make appointments. If you have questions, please call your primary care clinic.  If you do not have a primary care provider, please call [phone] and they will assist you.      [employer] is an electronic gateway that provides easy, online access to your medical records. With [employer], you can request a clinic appointment, read your test results, renew a prescription or communicate with your care team.     To access your existing account, please contact your [facility] or call [phone] for assistance.        Care EveryWhere ID     This is your Care EveryWhere ID. This could be used by other organizations to access your [place] medical records  [id number]        Your Vitals Were     Pulse Temperature Pulse Oximetry BMI (Body Mass Index)          83 97.9  F (36.6  C) (Oral) 98% 21.31 kg/m2         Blood Pressure from Last 3 Encounters:   11/05/18 144/67   10/23/18 137/68   10/16/18 124/63    Weight from Last 3 Encounters:   11/05/18 75.3 kg (166 lb)   09/18/18 75.8 kg (167 lb)   09/15/18 76.6 kg (168 lb 12.8 oz)              We Performed the Following     FLU VACCINE, AGE  >= 3 YR     FLU VACCINE, INCREASED ANTIGEN, PRESV FREE, AGE 65+ [47706]          Today's Medication Changes          These changes are accurate as of 11/5/18  2:29 PM.  If you have any questions, ask your nurse or doctor.               These medicines have changed or have updated prescriptions.        Dose/Directions    * clopidogrel 75 MG tablet   Commonly known as:  PLAVIX   This may have changed:  when to take this   Used for:  Peripheral vascular disease, unspecified (H)        Dose:  75 mg   Take 1 tablet (75 mg) by mouth daily   Quantity:  30 tablet   Refills:  11       * clopidogrel 75 MG tablet   Commonly known as:  PLAVIX   This may have changed:  Another medication with the same name was changed. Make sure you understand how and when to take each.   Used for:  Peripheral vascular disease (H)        Dose:  75 mg   Take 1 tablet (75 mg) by mouth daily   Quantity:  90 tablet   Refills:  0       gentamicin 0.1 % cream   Commonly known as:  GARAMYCIN   This may have changed:    - when to take this  - reasons to take this  - additional instructions   Used for:  Ulcer of right lower leg, with fat layer exposed (H), Chronic venous hypertension with ulcer involving right side (H), Type 2 diabetes, controlled, with neuropathy (H)        Apply topically daily To right leg ulcer.   Quantity:  30 g   Refills:  5       lisinopril 10 MG tablet   Commonly known as:  PRINIVIL/ZESTRIL   This may have changed:  when to take this   Used for:  Benign essential hypertension        Dose:  10 mg   Take 1 tablet (10 mg) by mouth daily   Quantity:  90 tablet   Refills:  3       * Notice:  This list has 2 medication(s) that are the same as other medications prescribed for you. Read the directions carefully, and ask your doctor or other care provider to review them with you.             Primary Care Provider Office Phone # Fax #    Racheal Swift [phone] [address] [address]         Equal Access to Services     [facility] : Hadii aad ku hadjeannieyomaira Sammieali, wajunitoda luqadaha, qaybta kakayliyklee jeffery, yolanda lopez. So [place] [phone].    ATENCIÓN: Si habla español, tiene a rodrigues disposición servicios gratuitos de asistencia lingüística. Llame al [phone].    We comply with applicable federal civil rights laws and [place] laws. We do not discriminate on the basis of race, color, national origin, age, disability, sex, sexual orientation, or gender identity.            Thank you!     Thank you for choosing [facility]  for your care. Our goal is always to provide you with excellent care. Hearing back from our patients is one way we can continue to improve our services. Please take a few minutes to complete the written survey that you may receive in the mail after your visit with us. Thank you!             Your Updated Medication List - Protect others around you: Learn how to safely use, store and throw away your medicines at www.disposemymeds.org.          This list is accurate as of 11/5/18  2:29 PM.  Always use your most recent med list.                   Brand Name Dispense Instructions for use Diagnosis    ammonium lactate 12 % cream    LAC-HYDRIN    385 g    Apply topically 2 times daily as needed for dry skin    Venous stasis, Type 2 diabetes, controlled, with neuropathy (H)       ascorbic acid 500 MG Tabs     30 tablet    Take 1 tablet (500 mg) by mouth 2 times daily    Ulcer of right lower leg, with fat layer exposed (H)       ASPIRIN PO      Take 81 mg by mouth daily        blood glucose monitoring lancets     3 Box    Use to test blood sugars 2 as directed.    Type 2 diabetes, uncontrolled, with neuropathy (H)       Blood Pressure Kit     1 kit    1 Device daily    Benign essential hypertension       clindamycin 300 MG capsule    CLEOCIN    28 capsule    Take 1 capsule (300 mg) by mouth 2 times daily    Ulcer of right lower extremity  "with fat layer exposed (H), Venous stasis, Type II or unspecified type diabetes mellitus with neurological manifestations, not stated as uncontrolled(250.60) (H)       * clopidogrel 75 MG tablet    PLAVIX    30 tablet    Take 1 tablet (75 mg) by mouth daily    Peripheral vascular disease, unspecified (H)       * clopidogrel 75 MG tablet    PLAVIX    90 tablet    Take 1 tablet (75 mg) by mouth daily    Peripheral vascular disease (H)       ferrous sulfate 325 (65 Fe) MG tablet    IRON    60 tablet    Take 1 tablet (325 mg) by mouth 2 times daily    Peripheral vascular disease, unspecified (H)       gentamicin 0.1 % cream    GARAMYCIN    30 g    Apply topically daily To right leg ulcer.    Ulcer of right lower leg, with fat layer exposed (H), Chronic venous hypertension with ulcer involving right side (H), Type 2 diabetes, controlled, with neuropathy (H)       insulin glargine 100 UNIT/ML injection    LANTUS SOLOSTAR    3 mL    Inject 16 Units Subcutaneous daily (with dinner) May take up to 22 units daily    Type 2 diabetes mellitus with diabetic peripheral angiopathy without gangrene, with long-term current use of insulin (H)       insulin pen needle 31G X 8 MM    B-D U/F    100 each    Use 1 daily as directed    Diabetes mellitus, type II (H)       lisinopril 10 MG tablet    PRINIVIL/ZESTRIL    90 tablet    Take 1 tablet (10 mg) by mouth daily    Benign essential hypertension       * nateglinide 120 MG tablet    STARLIX    90 tablet    TAKE 1 TABLET BY MOUTH THREE TIMES DAILY BEFORE MEALS    Type 2 diabetes, controlled, with neuropathy (H)       * nateglinide 120 MG tablet    STARLIX    96 tablet    TAKE 1 TABLET BY MOUTH THREE TIMES DAILY BEFORE MEALS    Diabetes mellitus (H)       ONETOUCH ULTRA test strip   Generic drug:  blood glucose monitoring     200 strip    USE TO TEST TWICE DAILY OR AS DIRECTED    Type 2 diabetes mellitus (H)       OPTIFOAM 6\"X6\" Pads     1 each    1 Box once a week    Ulcer of right leg, " with fat layer exposed (H)       order for DME     2 each    Please measure and distribute 1 pair of 20mm Hg - 30mm Hg knee high ULCER CARE open or closed toe compression stockings.  Patient has a size 13 foot and please take this into consideration.  [place]st or equivalent    Varicose veins of lower extremities with other complications, Venous stasis ulcer of right lower extremity (H)       order for DME     3 each    Please measure and distribute 1 pair of 20mmHg - 30mmHg knee high open or closed toe compression stockings. Jobst ultrasheer or equivalent.    Varicose veins of both lower extremities with complications       order for DME     2 each    Please measure and distribute 1 pair of 30mmHg - 40mmHg knee high open toe ulcercare compression stockings. Jobst ultrasheer or equivalent.    Varicose veins of bilateral lower extremities with other complications       oxyCODONE IR 5 MG tablet    ROXICODONE    12 tablet    Take 0.5-1 tablets (2.5-5 mg) by mouth every 4 hours as needed for other (pain control or improvement in physical function. Hold dose for analgesic side effects.)    Acute post-operative pain       sildenafil 50 MG tablet    VIAGRA    10 tablet    Take 1 tablet (50 mg) by mouth daily as needed for erectile dysfunction    Vasculogenic erectile dysfunction, unspecified vasculogenic erectile dysfunction type       silver sulfADIAZINE 1 % cream    SILVADENE    85 g    Apply topically daily To affected areas on right foot and leg.    Ulcer of right lower leg, with fat layer exposed (H), Chronic venous hypertension with ulcer involving right side (H), Type 2 diabetes, controlled, with neuropathy (H)       simvastatin 10 MG tablet    ZOCOR    90 tablet    Take 1 tablet (10 mg) by mouth At Bedtime    Type 2 diabetes, controlled, with neuropathy (H)       triamcinolone 0.1 % cream    KENALOG    60 g    Apply sparingly to left heel daily.    Dermatitis of left foot       VITAMIN C PO      Take 1,000 mg by mouth         VITAMIN D (CHOLECALCIFEROL) PO      Take 1,000 Units by mouth 2 times daily        * Notice:  This list has 4 medication(s) that are the same as other medications prescribed for you. Read the directions carefully, and ask your doctor or other care provider to review them with you.

## 2018-11-05 NOTE — PATIENT INSTRUCTIONS
[facility] Medication Refill Request Information:  * Please contact your pharmacy regarding ANY request for medication refills.  ** [facility] Prescription Fax = [phone]  * Please allow 3 business days for routine medication refills.  * Please allow 5 business days for controlled substance medication refills.     [facility] Test Result notification information:  *You will be notified with in 7-10 days of your appointment day regarding the results of your test.  If you are on MyChart you will be notified as soon as the provider has reviewed the results and signed off on them.    [facility]: [phone]

## 2018-11-05 NOTE — NURSING NOTE
Chief Complaint   Patient presents with     Diabetes     Pt is here to follow up on diabetes.      Georgina Henry LPN at 1:20 PM on 11/5/2018.

## 2018-11-05 NOTE — PROGRESS NOTES
[facility]  Primary Care Center   Racheal Swift MD  11/05/2018      Chief Complaint:   Diabetes     History of Present Illness:   Amos Walker is a 73 year old male with a history of tobacco use, CKD, PAD, DM2, and HTN who presents for follow up of diabetes.    He underwent right femoral endarterectomy/popliteal angioplasty/SFA stenting on 9/14/18.  When he followed up in clinic, the nurse practitioner seemed satisfied with the flow on ultrasound.  He will see his surgeon back this week.  His stitches seem to be healing well.      He has non-healing ulcers on his right lower leg and foot for which he is following with podiatry regularly.  Dr. Mcclain has started a new skin graft product.        He had to cancel his colonoscopy as he is now on Plavix after his surgery.  He thinks he needs to be on that for at least 6 months.    He had a crown break off his tooth recently.  Area does not cause him any pain but he may need to have the tooth pulled.      His last refill of Lisinopril was for 10 mg rather than 20 mg.  He was having some generalized weakness when on the higher dose.  He notes that after surgery, they had a hard time getting his blood pressure to come up while in recovery and he had to spend extra time there.  Blood pressure on arrival today was 147/67.    He has been taking Lantus 18 units as he has not been exercising the past few months.  He has not been checking his blood sugars regularly.  He is amenable to doing labs at his local clinic.      His hemoglobin on the day of his surgery was 8.8 and this has not been checked since then.  He denies any blood in his stools.      Review of Systems:   Pertinent items are noted in HPI, remainder of complete ROS is negative.      Active Medications:      ammonium lactate (LAC-HYDRIN) 12 % cream, Apply topically 2 times daily as needed for dry skin, Disp: 385 g, Rfl: 3     Ascorbic Acid (VITAMIN C PO), Take 1,000 mg by mouth, Disp: , Rfl:       ASPIRIN  PO, Take 81 mg by mouth daily, Disp: , Rfl:      clindamycin (CLEOCIN) 300 MG capsule, Take 1 capsule (300 mg) by mouth 2 times daily, Disp: 28 capsule, Rfl: 0     clopidogrel (PLAVIX) 75 MG tablet, Take 1 tablet (75 mg) by mouth daily, Disp: 90 tablet, Rfl: 0     ferrous sulfate (IRON) 325 (65 FE) MG tablet, Take 1 tablet (325 mg) by mouth 2 times daily, Disp: 60 tablet, Rfl: 11     gentamicin (GARAMYCIN) 0.1 % cream, Apply topically daily To right leg ulcer. (Patient taking differently: Apply topically daily as needed To right leg ulcer.), Disp: 30 g, Rfl: 5     insulin glargine (LANTUS SOLOSTAR) 100 UNIT/ML pen, Inject 16 Units Subcutaneous daily (with dinner) May take up to 22 units daily, Disp: 3 mL, Rfl: 3     lisinopril (PRINIVIL/ZESTRIL) 10 MG tablet, Take 1 tablet (10 mg) by mouth daily (Patient taking differently: Take 10 mg by mouth every evening ), Disp: 90 tablet, Rfl: 3     nateglinide (STARLIX) 120 MG tablet, TAKE 1 TABLET BY MOUTH THREE TIMES DAILY BEFORE MEALS, Disp: 90 tablet, Rfl: 11     oxyCODONE IR (ROXICODONE) 5 MG tablet, Take 0.5-1 tablets (2.5-5 mg) by mouth every 4 hours as needed for other (pain control or improvement in physical function. Hold dose for analgesic side effects.), Disp: 12 tablet, Rfl: 0     sildenafil (VIAGRA) 50 MG tablet, Take 1 tablet (50 mg) by mouth daily as needed for erectile dysfunction, Disp: 10 tablet, Rfl: 11     silver sulfADIAZINE (SILVADENE) 1 % cream, Apply topically daily To affected areas on right foot and leg., Disp: 85 g, Rfl: 5     simvastatin (ZOCOR) 10 MG tablet, Take 1 tablet (10 mg) by mouth At Bedtime, Disp: 90 tablet, Rfl: 3     triamcinolone (KENALOG) 0.1 % cream, Apply sparingly to left heel daily., Disp: 60 g, Rfl: 1     VITAMIN D, CHOLECALCIFEROL, PO, Take 1,000 Units by mouth 2 times daily, Disp: , Rfl:       Allergies:   Lisinopril; Neomycin; Methylchloroisothiazolinone [methylisothiazolinone]; and Povidone iodine      Past Medical  History:  Peripheral vascular disease  Hypertension  Hyperlipidemia   Anemia   Chronic kidney disease, stage 3  Femoral neck fracture   Diabetes mellitus  Non-healing ulcer  Senile nuclear sclerosis    Past Surgical History:  Angioplasty, femoral endarterectomy, right 9/14/18  Total hip arthoplasty, left 8/27/17  Right foot surgery for infection 2009  Cervical disc fusion  Coronary angiogram    Family History:   Colon cancer -father  Kidney disease - father, mother   Coronary artery disease - son  Macular degeneration - brother      Social History:   Marital Status:    Presents to clinic alone  Tobacco Use: Current daily smoker, 0.5 PPD.   Alcohol Use: No alcohol use.      Physical Exam:   /67  Pulse 83  Temp 97.9  F (36.6  C) (Oral)  Wt 75.3 kg (166 lb)  SpO2 98%  BMI 21.31 kg/m2     Constitutional: Alert, oriented, pleasant, no acute distress  Head: Normocephalic, atraumatic  Eyes: Extra-ocular movements intact, no scleral icterus  ENT: Oropharynx clear, moist mucus membranes, poor dentition  Cardiovascular: Regular rate and rhythm, no murmurs, rubs or gallops, peripheral pulses full/symmetric  Respiratory: Good air movement bilaterally, lungs clear, no wheezes/rales/rhonchi  Musculoskeletal: No edema, normal muscle tone, normal gait, right lower extremity wounds bandaged, not addressed today  Neurologic: Alert and oriented, cranial nerves 2-12 intact.  Psychiatric: normal mentation, affect and mood      Assessment and Plan:  Need for prophylactic vaccination and inoculation against influenza  - FLU VACCINE, INCREASED ANTIGEN, PRESV FREE, AGE 65+ [23269]    Essential hypertension  Stable on lower dose of Lisinopril.      PVD (peripheral vascular disease) (H)  He is status post recent right femoral endarterectomy and angioplasty/stenting of RLE.  He is currently taking Plavix.  We will have to determine how soon he may stop his Plavix in order to proceed with colonoscopy and dental extractions.  -  FLU VACCINE, INCREASED ANTIGEN, PRESV FREE, AGE 65+ [22639]  - Lipid panel reflex to direct LDL Fasting  - CBC with platelets **(2 WKS)    Type 2 diabetes, controlled, with neuropathy (H)  - FLU VACCINE, INCREASED ANTIGEN, PRESV FREE, AGE 65+ [32334]  - Hemoglobin A1c **(2 WKS)  - Lipid panel reflex to direct LDL Fasting    Chronic skin ulcer, unspecified ulcer stage (H)  - FLU VACCINE, INCREASED ANTIGEN, PRESV FREE, AGE 65+ [93260]  - Hemoglobin A1c **(2 WKS)    Anemia due to blood loss, acute  - CBC with platelets **(2 WKS)       Follow-up: Return in about 8 weeks (around 12/31/2018) for Routine Visit.         Scribe Disclosure:   I, Racheal Coreas, am serving as a scribe to document services personally performed by Racheal Swift MD at this visit, based upon the provider's statements to me. All documentation has been reviewed by the aforementioned provider prior to being entered into the official medical record.     Portions of this medical record were completed by a scribe. UPON MY REVIEW AND AUTHENTICATION BY ELECTRONIC SIGNATURE, this confirms (a) I performed the applicable clinical services, and (b) the record is accurate.    Racheal Swift MD  Internal Medicine    25 min spent face to face, of which >50% time spent on counseling/coordinating care exclusive of any procedure time

## 2018-11-05 NOTE — NURSING NOTE
Amos Walker      1.  Has the patient received the information for the influenza vaccine? YES    2.  Does the patient have any of the following contraindications?     Allergy to eggs? No     Allergic reaction to previous influenza vaccines? No     Any other problems to previous influenza vaccines? No     Paralyzed by Guillain-[place]field syndrome? No     Currently pregnant? NO     Current moderate or severe illness? No     Allergy to contact lens solution? No    3.  The vaccine has been administered in the usual fashion and the patient was instructed to wait 20 minutes before leaving the building in the event of an allergic reaction: YES    Vaccination given by Georgina Henry LPN at 1:23 PM on 11/5/2018.    Recorded by Georgina Henry

## 2018-11-06 ENCOUNTER — OFFICE VISIT (OUTPATIENT)
Dept: PODIATRY | Facility: CLINIC | Age: 73
End: 2018-11-06
Payer: MEDICARE

## 2018-11-06 VITALS
SYSTOLIC BLOOD PRESSURE: 137 MMHG | HEART RATE: 98 BPM | DIASTOLIC BLOOD PRESSURE: 74 MMHG | TEMPERATURE: 97.8 F | OXYGEN SATURATION: 100 %

## 2018-11-06 DIAGNOSIS — E11.51 DIABETES MELLITUS WITH PERIPHERAL VASCULAR DISEASE (H): ICD-10-CM

## 2018-11-06 DIAGNOSIS — L97.912 ULCER OF RIGHT LOWER EXTREMITY WITH FAT LAYER EXPOSED (H): Primary | ICD-10-CM

## 2018-11-06 DIAGNOSIS — E11.49 TYPE II OR UNSPECIFIED TYPE DIABETES MELLITUS WITH NEUROLOGICAL MANIFESTATIONS, NOT STATED AS UNCONTROLLED(250.60) (H): ICD-10-CM

## 2018-11-06 DIAGNOSIS — L97.512 SKIN ULCER OF RIGHT FOOT WITH FAT LAYER EXPOSED (H): ICD-10-CM

## 2018-11-06 PROCEDURE — 15271 SKIN SUB GRAFT TRNK/ARM/LEG: CPT | Performed by: PODIATRIST

## 2018-11-06 ASSESSMENT — PAIN SCALES - GENERAL: PAINLEVEL: MILD PAIN (2)
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
## 2018-11-06 NOTE — NURSING NOTE
Amos Walker's chief complaint for this visit includes:  Chief Complaint   Patient presents with     RECHECK     right leg ulcer     PCP: Racheal Swift    Referring Provider:  No referring provider defined for this encounter.    /74  Pulse 98  Temp 97.8  F (36.6  C)  SpO2 100%  Mild Pain (2)     Do you need any medication refills at today's visit? No
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
## 2018-11-06 NOTE — PATIENT INSTRUCTIONS
Thanks for coming today.  [facility]  [address] [place] [address]    To schedule future appointments in Ortho Clinic, you may call [phone].    To schedule ordered imaging by your provider:   Call [facility] [phone]    To schedule an injection ordered by your provider:  Call [facility] Injection scheduling line: [phone]  [email or web address] available online at:  [email or web address]    Please call if any further questions or concerns ([phone]).  Clinic hours 8 am to 5 pm.    Return to clinic (call) if symptoms worsen or fail to improve.

## 2018-11-06 NOTE — MR AVS SNAPSHOT
After Visit Summary   2018    Amos Walker    MRN: [id number]           Patient Information     Date Of Birth          1945        Visit Information        Provider Department      2018 3:00 PM Brayan Mcclain DPM [facility]        Today's Diagnoses     Ulcer of right lower extremity with fat layer exposed (H)    -  1    Skin ulcer of right foot with fat layer exposed (H)        Type II or unspecified type diabetes mellitus with neurological manifestations, not stated as uncontrolled(250.60) (H)        Diabetes mellitus with peripheral vascular disease (H)          Care Instructions    Thanks for coming today.  [facility]  [address] [place] [address]    To schedule future appointments in Ortho Clinic, you may call [phone].    To schedule ordered imaging by your provider:   Call Central Imaging [phone]    To schedule an injection ordered by your provider:  Call Central Imaging Injection scheduling line: [phone]  [employer] available online at:  [email or web address]    Please call if any further questions or concerns ([phone]).  Clinic hours 8 am to 5 pm.    Return to clinic (call) if symptoms worsen or fail to improve.            Follow-ups after your visit        Your next 10 appointments already scheduled     2018  1:00 PM CST   US CAROL DOPPLER NO EXERCISE 1-2 LVLS [facility]ILAT [facility] ([facility])    [address] [place] [address] [phone]           How do I prepare for my exam? (Food and drink instructions) No caffeine or tobacco for 1 hour prior to exam.  What should I wear: Wear comfortable clothes.  How long does the exam take: Most ultrasounds take 30 to 60 minutes.  What should I bring: Bring a list of your medicines, including vitamins, minerals and over-the-counter drugs. It is safest to leave  personal items at home.  Do I need a :  No  is needed.  What do I need to tell my doctor: Tell your doctor about any allergies you may have.  What should I do after the exam: No restrictions, You may resume normal activities.  What is this test: An ultrasound uses sound waves to make pictures of the body. Sound waves do not cause pain. The only discomfort may be the pressure of the wand against your skin or full bladder.  Who should I call with questions: If you have any questions, please call the Imaging Department where you will have your exam. Directions, parking instructions, and other information is available on our website, [email or web address].            Nov 08, 2018  2:00 PM CST   US LOWER EXTREMITY ARTERIAL DUPLEX RIGHT with UCUSV2   [email or web address])    [address] [place] [address] [phone]           How do I prepare for my exam? (Food and drink instructions) No Food and Drink Restrictions.  How do I prepare for my exam? (Other instructions) You do not need to do anything special to prepare for your exam.  What should I wear: Wear comfortable clothes.  How long does the exam take: Most ultrasounds take 30 to 60 minutes.  What should I bring: Bring a list of your medicines, including vitamins, minerals and over-the-counter drugs. It is safest to leave personal items at home.  Do I need a :  No  is needed.  What do I need to tell my doctor: Tell your doctor about any allergies you may have.  What should I do after the exam: No restrictions, You may resume normal activities.  What is this test: An ultrasound uses sound waves to make pictures of the body. Sound waves do not cause pain. The only discomfort may be the pressure of the wand against your skin or full bladder.  Who should I call with questions: If you have any questions, please call the Imaging Department where you will have your exam.  Directions, parking instructions, and other information is available on our website, [email or web address].            Nov 08, 2018  3:40 PM CST   (Arrive by 3:25 PM)   Return Vascular Visit with Augusto Maharaj MD   [facility] ([facility])    [address] [address]   [phone]            Nov 13, 2018  3:00 PM CST   Return Visit with Brayan Mcclain DPM   [facility]    [address] [facility] [address]   [phone]            Nov 20, 2018  2:30 PM CST   Return Visit with Brayan Mcclain DPM   [facility]    [address] [facility] [address]   [phone]            Nov 27, 2018  2:30 PM CST   Return Visit with Brayan Mcclain DPM   [facility]    [address] [facility] [address]   [phone]            Dec 04, 2018  3:00 PM CST   Return Visit with Brayan Mcclain DPM   [facility]    [address] [facility] [address]   [phone]            Dec 11, 2018  2:30 PM CST   Return Visit with Brayan Mcclain DPM   [facility]    [address] [facility] [address]   [phone]            Dec 18, 2018  2:30 PM CST   Return Visit with Brayan Mcclain DPM   [facility]    [address] [facility] [address]   [phone]            Jan 02, 2019  2:30 PM CST   Return Visit with Brayan Mcclain DPM   [facility]    [address] [facility] [address]   [phone]              Future tests that were ordered for you today     Open Future Orders        Priority Expected Expires Ordered    Hemoglobin A1c **(2 WKS)  Routine 11/19/2018 11/5/2019 11/5/2018    Lipid panel reflex to direct LDL Fasting Routine 11/19/2018 11/5/2019 11/5/2018    CBC with platelets **(2 WKS) Routine 11/19/2018 11/5/2019 11/5/2018            Who to contact     If you have questions or need follow up information about today's clinic visit or your schedule please contact [facility] directly at [phone].  Normal or non-critical lab and imaging results will be communicated to you by [employer], letter or phone within 4 business days after the clinic has received the results. If you do not hear from us within 7 days, please contact the clinic through [employer] or phone. If you have a critical or abnormal lab result, we will notify you by phone as soon as possible.  Submit refill requests through [employer] or call your pharmacy and they will forward the refill request to us. Please allow 3 business days for your refill to be completed.          Additional Information About Your Visit        [employer] Information     [employer] gives you secure access to your electronic health record. If you see a primary care provider, you can also send messages to your care team and make appointments. If you have questions, please call your primary care clinic.  If you do not have a primary care provider, please call [phone] and they will assist you.      [employer] is an electronic gateway that provides easy, online access to your medical records. With [employer], you can request a clinic appointment, read your test results, renew a prescription or communicate with your care team.     To access your existing account, please contact your [facility] or call [phone] for assistance.        Care EveryWhere ID     This is your Care EveryWhere ID. This could be used by other organizations to access your [place] medical records  [id number]        Your Vitals Were     Pulse Temperature Pulse Oximetry             98 97.8  F (36.6  C)  100%          Blood Pressure from Last 3 Encounters:   11/06/18 137/74   11/05/18 144/67   10/23/18 137/68    Weight from Last 3 Encounters:   11/05/18 75.3 kg (166 lb)   09/18/18 75.8 kg (167 lb)   09/15/18 76.6 kg (168 lb 12.8 oz)              We Performed the Following     HC SKIN SUB GRAFT T/A/L AREA/<100SCM /<1ST 25 SCM     TISSUE APLIGRAF SUPPLY, PER SQ CM          Today's Medication Changes          These changes are accurate as of 11/6/18  3:40 PM.  If you have any questions, ask your nurse or doctor.               These medicines have changed or have updated prescriptions.        Dose/Directions    * clopidogrel 75 MG tablet   Commonly known as:  PLAVIX   This may have changed:  when to take this   Used for:  Peripheral vascular disease, unspecified (H)        Dose:  75 mg   Take 1 tablet (75 mg) by mouth daily   Quantity:  30 tablet   Refills:  11       * clopidogrel 75 MG tablet   Commonly known as:  PLAVIX   This may have changed:  Another medication with the same name was changed. Make sure you understand how and when to take each.   Used for:  Peripheral vascular disease (H)        Dose:  75 mg   Take 1 tablet (75 mg) by mouth daily   Quantity:  90 tablet   Refills:  0       gentamicin 0.1 % cream   Commonly known as:  GARAMYCIN   This may have changed:    - when to take this  - reasons to take this  - additional instructions   Used for:  Ulcer of right lower leg, with fat layer exposed (H), Chronic venous hypertension with ulcer involving right side (H), Type 2 diabetes, controlled, with neuropathy (H)        Apply topically daily To right leg ulcer.   Quantity:  30 g   Refills:  5       lisinopril 10 MG tablet   Commonly known as:  PRINIVIL/ZESTRIL   This may have changed:  when to take this   Used for:  Benign essential hypertension        Dose:  10 mg   Take 1 tablet (10 mg) by mouth daily   Quantity:  90 tablet   Refills:  3       * Notice:  This list has 2 medication(s) that are the same as other  medications prescribed for you. Read the directions carefully, and ask your doctor or other care provider to review them with you.             Primary Care Provider Office Phone # Fax #    Racheal Swift [phone] [address] [address]        Equal Access to Services     SAMSON MELTON : Hadii aad ku hadasho Soomaali, waaxda luqadaha, qaybta kaalmada adeegyada, waxay geniin hayaan zane lunaantoniromulo lopez. So [facility] [phone].    ATENCIÓN: Si habla español, tiene a rodrigues disposición servicios gratuitos de asistencia lingüística. Llame al [phone].    We comply with applicable federal civil rights laws and [place] laws. We do not discriminate on the basis of race, color, national origin, age, disability, sex, sexual orientation, or gender identity.            Thank you!     Thank you for choosing [facility]  for your care. Our goal is always to provide you with excellent care. Hearing back from our patients is one way we can continue to improve our services. Please take a few minutes to complete the written survey that you may receive in the mail after your visit with us. Thank you!             Your Updated Medication List - Protect others around you: Learn how to safely use, store and throw away your medicines at www.disposemymeds.org.          This list is accurate as of 11/6/18  3:40 PM.  Always use your most recent med list.                   Brand Name Dispense Instructions for use Diagnosis    ammonium lactate 12 % cream    LAC-HYDRIN    385 g    Apply topically 2 times daily as needed for dry skin    Venous stasis, Type 2 diabetes, controlled, with neuropathy (H)       ascorbic acid 500 MG Tabs     30 tablet    Take 1 tablet (500 mg) by mouth 2 times daily    Ulcer of right lower leg, with fat layer exposed (H)       ASPIRIN PO      Take 81 mg by mouth daily        blood glucose monitoring lancets     3 Box    Use to test blood sugars 2 as  directed.    Type 2 diabetes, uncontrolled, with neuropathy (H)       Blood Pressure Kit     1 kit    1 Device daily    Benign essential hypertension       clindamycin 300 MG capsule    CLEOCIN    28 capsule    Take 1 capsule (300 mg) by mouth 2 times daily    Ulcer of right lower extremity with fat layer exposed (H), Venous stasis, Type II or unspecified type diabetes mellitus with neurological manifestations, not stated as uncontrolled(250.60) (H)       * clopidogrel 75 MG tablet    PLAVIX    30 tablet    Take 1 tablet (75 mg) by mouth daily    Peripheral vascular disease, unspecified (H)       * clopidogrel 75 MG tablet    PLAVIX    90 tablet    Take 1 tablet (75 mg) by mouth daily    Peripheral vascular disease (H)       ferrous sulfate 325 (65 Fe) MG tablet    IRON    60 tablet    Take 1 tablet (325 mg) by mouth 2 times daily    Peripheral vascular disease, unspecified (H)       gentamicin 0.1 % cream    GARAMYCIN    30 g    Apply topically daily To right leg ulcer.    Ulcer of right lower leg, with fat layer exposed (H), Chronic venous hypertension with ulcer involving right side (H), Type 2 diabetes, controlled, with neuropathy (H)       insulin glargine 100 UNIT/ML injection    LANTUS SOLOSTAR    3 mL    Inject 16 Units Subcutaneous daily (with dinner) May take up to 22 units daily    Type 2 diabetes mellitus with diabetic peripheral angiopathy without gangrene, with long-term current use of insulin (H)       insulin pen needle 31G X 8 MM    B-D U/F    100 each    Use 1 daily as directed    Diabetes mellitus, type II (H)       lisinopril 10 MG tablet    PRINIVIL/ZESTRIL    90 tablet    Take 1 tablet (10 mg) by mouth daily    Benign essential hypertension       * nateglinide 120 MG tablet    STARLIX    90 tablet    TAKE 1 TABLET BY MOUTH THREE TIMES DAILY BEFORE MEALS    Type 2 diabetes, controlled, with neuropathy (H)       * nateglinide 120 MG tablet    STARLIX    96 tablet    TAKE 1 TABLET BY MOUTH THREE  "TIMES DAILY BEFORE MEALS    Diabetes mellitus (H)       ONETOUCH ULTRA test strip   Generic drug:  blood glucose monitoring     200 strip    USE TO TEST TWICE DAILY OR AS DIRECTED    Type 2 diabetes mellitus (H)       OPTIFOAM 6\"X6\" Pads     1 each    1 Box once a week    Ulcer of right leg, with fat layer exposed (H)       order for DME     2 each    Please measure and distribute 1 pair of 20mm Hg - 30mm Hg knee high ULCER CARE open or closed toe compression stockings.  Patient has a size 13 foot and please take this into consideration.  Jobst or equivalent    Varicose veins of lower extremities with other complications, Venous stasis ulcer of right lower extremity (H)       order for DME     3 each    Please measure and distribute 1 pair of 20mmHg - 30mmHg knee high open or closed toe compression stockings. Jobst ultrasheer or equivalent.    Varicose veins of both lower extremities with complications       order for DME     2 each    Please measure and distribute 1 pair of 30mmHg - 40mmHg knee high open toe ulcercare compression stockings. Jobst ultrasheer or equivalent.    Varicose veins of bilateral lower extremities with other complications       oxyCODONE IR 5 MG tablet    ROXICODONE    12 tablet    Take 0.5-1 tablets (2.5-5 mg) by mouth every 4 hours as needed for other (pain control or improvement in physical function. Hold dose for analgesic side effects.)    Acute post-operative pain       sildenafil 50 MG tablet    VIAGRA    10 tablet    Take 1 tablet (50 mg) by mouth daily as needed for erectile dysfunction    Vasculogenic erectile dysfunction, unspecified vasculogenic erectile dysfunction type       silver sulfADIAZINE 1 % cream    SILVADENE    85 g    Apply topically daily To affected areas on right foot and leg.    Ulcer of right lower leg, with fat layer exposed (H), Chronic venous hypertension with ulcer involving right side (H), Type 2 diabetes, controlled, with neuropathy (H)       simvastatin 10 " MG tablet    ZOCOR    90 tablet    Take 1 tablet (10 mg) by mouth At Bedtime    Type 2 diabetes, controlled, with neuropathy (H)       triamcinolone 0.1 % cream    KENALOG    60 g    Apply sparingly to left heel daily.    Dermatitis of left foot       VITAMIN C PO      Take 1,000 mg by mouth        VITAMIN D (CHOLECALCIFEROL) PO      Take 1,000 Units by mouth 2 times daily        * Notice:  This list has 4 medication(s) that are the same as other medications prescribed for you. Read the directions carefully, and ask your doctor or other care provider to review them with you.

## 2018-11-08 ENCOUNTER — RADIANT APPOINTMENT (OUTPATIENT)
Dept: ULTRASOUND IMAGING | Facility: CLINIC | Age: 73
End: 2018-11-08
Attending: CLINICAL NURSE SPECIALIST
Payer: MEDICARE

## 2018-11-08 ENCOUNTER — OFFICE VISIT (OUTPATIENT)
Dept: VASCULAR SURGERY | Facility: CLINIC | Age: 73
End: 2018-11-08
Payer: MEDICARE

## 2018-11-08 VITALS — OXYGEN SATURATION: 99 % | HEART RATE: 82 BPM | DIASTOLIC BLOOD PRESSURE: 64 MMHG | SYSTOLIC BLOOD PRESSURE: 137 MMHG

## 2018-11-08 DIAGNOSIS — N18.30 CKD (CHRONIC KIDNEY DISEASE) STAGE 3, GFR 30-59 ML/MIN (H): ICD-10-CM

## 2018-11-08 DIAGNOSIS — I10 ESSENTIAL HYPERTENSION: ICD-10-CM

## 2018-11-08 DIAGNOSIS — E11.40 TYPE 2 DIABETES, CONTROLLED, WITH NEUROPATHY (H): ICD-10-CM

## 2018-11-08 DIAGNOSIS — I73.9 PAD (PERIPHERAL ARTERY DISEASE) (H): ICD-10-CM

## 2018-11-08 DIAGNOSIS — I70.25 ATHEROSCLEROSIS OF NATIVE ARTERIES OF OTHER EXTREMITIES WITH ULCERATION (H): ICD-10-CM

## 2018-11-08 DIAGNOSIS — L98.493: Primary | ICD-10-CM

## 2018-11-08 ASSESSMENT — PAIN SCALES - GENERAL: PAINLEVEL: MILD PAIN (2)

## 2018-11-08 NOTE — NURSING NOTE
Dermatology Rooming Note    Amos Walker's goals for this visit include:   Chief Complaint   Patient presents with     RECHECK     Amos is here to follow up on his surgery.     Adrianne Bee, CMA
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
## 2018-11-08 NOTE — MR AVS SNAPSHOT
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      Your next 10 appointments already scheduled     Nov 08, 2018  3:40 PM CST   (Arrive by 3:25 PM)   Return Vascular Visit with MD HAMZAH [facility] ([facility])    [address] [place] [address]   [phone]            Nov 13, 2018  3:00 PM CST   Return Visit with Brayan Mcclain DPM   [facility] ([facility]    [address] [facility] [address]   [phone]            Nov 20, 2018  2:30 PM CST   Return Visit with Brayan Mcclain DPM   [facility]    [address] [facility] [address]   [phone]            Nov 27, 2018  2:30 PM CST   Return Visit with Brayan Mcclain DPM   [facility]    [address] [facility] [address]   [phone]            Dec 04, 2018  3:00 PM CST   Return Visit with Brayan Mcclain DPM   [facility])    [address] [facility] [address]   [phone]            Dec 11, 2018  2:30 PM CST   Return Visit with Brayan Mcclain DPM   [facility])    [address] [facility] [address]    [phone]            Dec 18, 2018  2:30 PM CST   Return Visit with Brayan Mcclain DPM   [facility]    [address] [place] [address]   [phone]            Jan 02, 2019  2:30 PM CST   Return Visit with Brayan Mcclain DPM   [facility] ([facility])    [address] [place] [address]   [phone]            Anam 10, 2019 10:25 AM CST   (Arrive by 10:10 AM)   Return Visit with Racheal Swift MD   [facility] ([facility])    [address] [place] [address]  [address] [facility] [address] [phone]            May 09, 2019  2:40 PM CDT   (Arrive by 2:25 PM)   Return Vascular Visit with Augusto Maharaj MD   [facility] ([facility])    [address] [place] [address] [phone]              Who to contact     Please call your clinic at [phone] to:    Ask questions about your health    Make or cancel appointments    Discuss your medicines    Learn about your test results    Speak to your doctor            Additional Information About Your Visit        [employer] Information     [employer] gives you secure access to your electronic health record. If you see a primary care provider, you can also send messages to your care team and make appointments. If you have questions, please call your primary care clinic.  If you do not have a primary care provider, please call [phone] and they will assist you.      [employer] is an electronic gateway that provides easy, online access to your medical records. With [employer], you can request a clinic appointment, read your test results, renew a prescription or communicate with your care team.     To access your existing account, please contact your [facility] or call [phone] for assistance.        Care  EveryWhere ID     This is your Care EveryWhere ID. This could be used by other organizations to access your [place] medical records  [id number]        Your Vitals Were     Pulse Pulse Oximetry                82 99%           Blood Pressure from Last 3 Encounters:   11/08/18 137/64   11/06/18 137/74   11/05/18 144/67    Weight from Last 3 Encounters:   11/05/18 75.3 kg (166 lb)   09/18/18 75.8 kg (167 lb)   09/15/18 76.6 kg (168 lb 12.8 oz)              Today, you had the following     No orders found for display         Today's Medication Changes          These changes are accurate as of 11/8/18  3:15 PM.  If you have any questions, ask your nurse or doctor.               These medicines have changed or have updated prescriptions.        Dose/Directions    * clopidogrel 75 MG tablet   Commonly known as:  PLAVIX   This may have changed:  when to take this   Used for:  Peripheral vascular disease, unspecified (H)        Dose:  75 mg   Take 1 tablet (75 mg) by mouth daily   Quantity:  30 tablet   Refills:  11       * clopidogrel 75 MG tablet   Commonly known as:  PLAVIX   This may have changed:  Another medication with the same name was changed. Make sure you understand how and when to take each.   Used for:  Peripheral vascular disease (H)        Dose:  75 mg   Take 1 tablet (75 mg) by mouth daily   Quantity:  90 tablet   Refills:  0       gentamicin 0.1 % cream   Commonly known as:  GARAMYCIN   This may have changed:    - when to take this  - reasons to take this  - additional instructions   Used for:  Ulcer of right lower leg, with fat layer exposed (H), Chronic venous hypertension with ulcer involving right side (H), Type 2 diabetes, controlled, with neuropathy (H)        Apply topically daily To right leg ulcer.   Quantity:  30 g   Refills:  5       lisinopril 10 MG tablet   Commonly known as:  PRINIVIL/ZESTRIL   This may have changed:  when to take this   Used for:  Benign essential hypertension        Dose:   10 mg   Take 1 tablet (10 mg) by mouth daily   Quantity:  90 tablet   Refills:  3       * Notice:  This list has 2 medication(s) that are the same as other medications prescribed for you. Read the directions carefully, and ask your doctor or other care provider to review them with you.             Primary Care Provider Office Phone # Fax #    Racheal Swift [phone] [address] [address]        Equal Access to Services     SAMSON MELTON : Hadii aad ku hadasho Soomaali, waaxda luqadaha, qaybta kaalmada adeegyada, waxay idiin hayaan adeeg kharash laking . So [facility] [phone].    ATENCIÓN: Si habla español, tiene a rodrigues disposición servicios gratuitos de asistencia lingüística. [facility] [phone].    We comply with applicable federal civil rights laws and [place] laws. We do not discriminate on the basis of race, color, national origin, age, disability, sex, sexual orientation, or gender identity.            Thank you!     Thank you for choosing [facility]  for your care. Our goal is always to provide you with excellent care. Hearing back from our patients is one way we can continue to improve our services. Please take a few minutes to complete the written survey that you may receive in the mail after your visit with us. Thank you!             Your Updated Medication List - Protect others around you: Learn how to safely use, store and throw away your medicines at www.disposemymeds.org.          This list is accurate as of 11/8/18  3:15 PM.  Always use your most recent med list.                   Brand Name Dispense Instructions for use Diagnosis    ammonium lactate 12 % cream    LAC-HYDRIN    385 g    Apply topically 2 times daily as needed for dry skin    Venous stasis, Type 2 diabetes, controlled, with neuropathy (H)       ascorbic acid 500 MG Tabs     30 tablet    Take 1 tablet (500 mg) by mouth 2 times daily    Ulcer of right lower leg, with  fat layer exposed (H)       ASPIRIN PO      Take 81 mg by mouth daily        blood glucose monitoring lancets     3 Box    Use to test blood sugars 2 as directed.    Type 2 diabetes, uncontrolled, with neuropathy (H)       Blood Pressure Kit     1 kit    1 Device daily    Benign essential hypertension       clindamycin 300 MG capsule    CLEOCIN    28 capsule    Take 1 capsule (300 mg) by mouth 2 times daily    Ulcer of right lower extremity with fat layer exposed (H), Venous stasis, Type II or unspecified type diabetes mellitus with neurological manifestations, not stated as uncontrolled(250.60) (H)       * clopidogrel 75 MG tablet    PLAVIX    30 tablet    Take 1 tablet (75 mg) by mouth daily    Peripheral vascular disease, unspecified (H)       * clopidogrel 75 MG tablet    PLAVIX    90 tablet    Take 1 tablet (75 mg) by mouth daily    Peripheral vascular disease (H)       ferrous sulfate 325 (65 Fe) MG tablet    IRON    60 tablet    Take 1 tablet (325 mg) by mouth 2 times daily    Peripheral vascular disease, unspecified (H)       gentamicin 0.1 % cream    GARAMYCIN    30 g    Apply topically daily To right leg ulcer.    Ulcer of right lower leg, with fat layer exposed (H), Chronic venous hypertension with ulcer involving right side (H), Type 2 diabetes, controlled, with neuropathy (H)       insulin glargine 100 UNIT/ML injection    LANTUS SOLOSTAR    3 mL    Inject 16 Units Subcutaneous daily (with dinner) May take up to 22 units daily    Type 2 diabetes mellitus with diabetic peripheral angiopathy without gangrene, with long-term current use of insulin (H)       insulin pen needle 31G X 8 MM    B-D U/F    100 each    Use 1 daily as directed    Diabetes mellitus, type II (H)       lisinopril 10 MG tablet    PRINIVIL/ZESTRIL    90 tablet    Take 1 tablet (10 mg) by mouth daily    Benign essential hypertension       * nateglinide 120 MG tablet    STARLIX    90 tablet    TAKE 1 TABLET BY MOUTH THREE TIMES DAILY  "BEFORE MEALS    Type 2 diabetes, controlled, with neuropathy (H)       * nateglinide 120 MG tablet    STARLIX    96 tablet    TAKE 1 TABLET BY MOUTH THREE TIMES DAILY BEFORE MEALS    Diabetes mellitus (H)       ONETOUCH ULTRA test strip   Generic drug:  blood glucose monitoring     200 strip    USE TO TEST TWICE DAILY OR AS DIRECTED    Type 2 diabetes mellitus (H)       OPTIFOAM 6\"X6\" Pads     1 each    1 Box once a week    Ulcer of right leg, with fat layer exposed (H)       order for DME     2 each    Please measure and distribute 1 pair of 20mm Hg - 30mm Hg knee high ULCER CARE open or closed toe compression stockings.  Patient has a size 13 foot and please take this into consideration.  Jobst or equivalent    Varicose veins of lower extremities with other complications, Venous stasis ulcer of right lower extremity (H)       order for DME     3 each    Please measure and distribute 1 pair of 20mmHg - 30mmHg knee high open or closed toe compression stockings. Jobst ultrasheer or equivalent.    Varicose veins of both lower extremities with complications       order for DME     2 each    Please measure and distribute 1 pair of 30mmHg - 40mmHg knee high open toe ulcercare compression stockings. Jobst ultrasheer or equivalent.    Varicose veins of bilateral lower extremities with other complications       oxyCODONE IR 5 MG tablet    ROXICODONE    12 tablet    Take 0.5-1 tablets (2.5-5 mg) by mouth every 4 hours as needed for other (pain control or improvement in physical function. Hold dose for analgesic side effects.)    Acute post-operative pain       sildenafil 50 MG tablet    VIAGRA    10 tablet    Take 1 tablet (50 mg) by mouth daily as needed for erectile dysfunction    Vasculogenic erectile dysfunction, unspecified vasculogenic erectile dysfunction type       silver sulfADIAZINE 1 % cream    SILVADENE    85 g    Apply topically daily To affected areas on right foot and leg.    Ulcer of right lower leg, with fat " layer exposed (H), Chronic venous hypertension with ulcer involving right side (H), Type 2 diabetes, controlled, with neuropathy (H)       simvastatin 10 MG tablet    ZOCOR    90 tablet    Take 1 tablet (10 mg) by mouth At Bedtime    Type 2 diabetes, controlled, with neuropathy (H)       triamcinolone 0.1 % cream    KENALOG    60 g    Apply sparingly to left heel daily.    Dermatitis of left foot       VITAMIN C PO      Take 1,000 mg by mouth        VITAMIN D (CHOLECALCIFEROL) PO      Take 1,000 Units by mouth 2 times daily        * Notice:  This list has 4 medication(s) that are the same as other medications prescribed for you. Read the directions carefully, and ask your doctor or other care provider to review them with you.

## 2018-11-13 ENCOUNTER — OFFICE VISIT (OUTPATIENT)
Dept: PODIATRY | Facility: CLINIC | Age: 73
End: 2018-11-13
Payer: MEDICARE

## 2018-11-13 VITALS
RESPIRATION RATE: 18 BRPM | SYSTOLIC BLOOD PRESSURE: 125 MMHG | DIASTOLIC BLOOD PRESSURE: 60 MMHG | HEART RATE: 88 BPM | TEMPERATURE: 97.2 F | OXYGEN SATURATION: 100 %

## 2018-11-13 DIAGNOSIS — L97.912 ULCER OF RIGHT LOWER EXTREMITY WITH FAT LAYER EXPOSED (H): Primary | ICD-10-CM

## 2018-11-13 DIAGNOSIS — E11.51 DIABETES MELLITUS WITH PERIPHERAL VASCULAR DISEASE (H): ICD-10-CM

## 2018-11-13 DIAGNOSIS — E11.49 TYPE II OR UNSPECIFIED TYPE DIABETES MELLITUS WITH NEUROLOGICAL MANIFESTATIONS, NOT STATED AS UNCONTROLLED(250.60) (H): ICD-10-CM

## 2018-11-13 DIAGNOSIS — L97.512 SKIN ULCER OF RIGHT FOOT WITH FAT LAYER EXPOSED (H): ICD-10-CM

## 2018-11-13 DIAGNOSIS — E11.9 DIABETES MELLITUS (H): ICD-10-CM

## 2018-11-13 PROCEDURE — 99212 OFFICE O/P EST SF 10 MIN: CPT | Performed by: PODIATRIST

## 2018-11-13 ASSESSMENT — PAIN SCALES - GENERAL: PAINLEVEL: MILD PAIN (3)
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
## 2018-11-13 NOTE — NURSING NOTE
Amos Walker's goals for this visit include: Return  He requests these members of his care team be copied on today's visit information: PCP    PCP: Racheal Swift    Referring Provider:  No referring provider defined for this encounter.    /60  Pulse 88  Temp 97.2  F (36.2  C)  Resp 18  SpO2 100%    Do you need any medication refills at today's visit? N
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
## 2018-11-13 NOTE — MR AVS SNAPSHOT
After Visit Summary   11/13/2018    Amos Walker    MRN: [id number]           Patient Information     Date Of Birth          1945        Visit Information        Provider Department      11/13/2018 3:00 PM Brayan Mcclain DPM [facility]        Today's Diagnoses     Ulcer of right lower extremity with fat layer exposed (H)    -  1    Skin ulcer of right foot with fat layer exposed (H)        Type II or unspecified type diabetes mellitus with neurological manifestations, not stated as uncontrolled(250.60) (H)        Diabetes mellitus with peripheral vascular disease (H)           Follow-ups after your visit        Your next 10 appointments already scheduled     Nov 20, 2018  2:30 PM CST   Return Visit with Brayan Mcclain DPM   [facility] ([facility])    [address] [facility] [address]   [phone]            Nov 27, 2018  2:30 PM CST   Return Visit with Brayan Mcclain DPM   [facility] ([facility])    [address] [facility] [address]   [phone]            Dec 04, 2018  3:00 PM CST   Return Visit with Brayan Mcclain DPM   [facility] ([facility])    [address] [facility] [address]   [phone]            Dec 11, 2018  2:30 PM CST   Return Visit with Brayan Mcclain DPM   [facility] ([facility])    [address] [facility] [address]   [phone]            Dec 18, 2018  2:30 PM CST   Return Visit with Brayan Mcclain DPM   [facility] ([facility])    [address] [facility] [address]   [phone]            Jan 02, 2019  2:30 PM CST   Return Visit with Brayan Mcclain DPM   [facility] ([facility])    [address] [facility] [address]   [phone]            Anam 10,  2019 10:25 AM CST   (Arrive by 10:10 AM)   Return Visit with Racheal Swift MD   [facility] ([facility])    [address]  4th Floor  [facility] [id number] [phone]            May 09, 2019  2:40 PM CDT   (Arrive by 2:25 PM)   Return Vascular Visit with Augusto Maharaj MD   [facility] ([facility])    [address]  3rd Floor  [facility] [address] [phone]            Jun 20, 2019 10:30 AM CDT   RETURN RETINA with Jen Aranda MD   [facility] ([facility])    [address]  [address] [address]  [facility] [address] [phone]              Who to contact     If you have questions or need follow up information about today's clinic visit or your schedule please contact [facility] directly at [phone].  Normal or non-critical lab and imaging results will be communicated to you by Job2Day[employer], letter or phone within 4 business days after the clinic has received the results. If you do not hear from us within 7 days, please contact the clinic through Job2Day[employer] or phone. If you have a critical or abnormal lab result, we will notify you by phone as soon as possible.  Submit refill requests through [employer] or call your pharmacy and they will forward the refill request to us. Please allow 3 business days for your refill to be completed.          Additional Information About Your Visit        Job2Dayhart Information     [employer] gives you secure access to your electronic health record. If you see a primary care provider, you can also send messages to your care team and make appointments. If you have questions, please call your primary care clinic.  If you do not have a primary care provider, please call [phone] and they will assist you.      [employer]ist is an electronic gateway that provides easy, online access to your medical  records. With [employer], you can request a clinic appointment, read your test results, renew a prescription or communicate with your care team.     To access your existing account, please contact your [facility] or call [phone] for assistance.        Care EveryWhere ID     This is your Care EveryWhere ID. This could be used by other organizations to access your [facility] medical records  [id number]        Your Vitals Were     Pulse Temperature Respirations Pulse Oximetry          88 97.2  F (36.2  C) 18 100%         Blood Pressure from Last 3 Encounters:   11/13/18 125/60   11/08/18 137/64   11/06/18 137/74    Weight from Last 3 Encounters:   11/05/18 75.3 kg (166 lb)   09/18/18 75.8 kg (167 lb)   09/15/18 76.6 kg (168 lb 12.8 oz)              Today, you had the following     No orders found for display         Today's Medication Changes          These changes are accurate as of 11/13/18 11:59 PM.  If you have any questions, ask your nurse or doctor.               These medicines have changed or have updated prescriptions.        Dose/Directions    * clopidogrel 75 MG tablet   Commonly known as:  PLAVIX   This may have changed:  when to take this   Used for:  Peripheral vascular disease, unspecified (H)        Dose:  75 mg   Take 1 tablet (75 mg) by mouth daily   Quantity:  30 tablet   Refills:  11       * clopidogrel 75 MG tablet   Commonly known as:  PLAVIX   This may have changed:  Another medication with the same name was changed. Make sure you understand how and when to take each.   Used for:  Peripheral vascular disease (H)        Dose:  75 mg   Take 1 tablet (75 mg) by mouth daily   Quantity:  90 tablet   Refills:  0       gentamicin 0.1 % cream   Commonly known as:  GARAMYCIN   This may have changed:    - when to take this  - reasons to take this  - additional instructions   Used for:  Ulcer of right lower leg, with fat layer exposed (H), Chronic venous hypertension with  ulcer involving right side (H), Type 2 diabetes, controlled, with neuropathy (H)        Apply topically daily To right leg ulcer.   Quantity:  30 g   Refills:  5       lisinopril 10 MG tablet   Commonly known as:  PRINIVIL/ZESTRIL   This may have changed:  when to take this   Used for:  Benign essential hypertension        Dose:  10 mg   Take 1 tablet (10 mg) by mouth daily   Quantity:  90 tablet   Refills:  3       * Notice:  This list has 2 medication(s) that are the same as other medications prescribed for you. Read the directions carefully, and ask your doctor or other care provider to review them with you.             Primary Care Provider Office Phone # Fax #    Racheal Swift [phone]       [address] [address]        Equal Access to Services     SAMSON MELTON : Nataliia ferrell Soger, waaxda luqadaha, qaybta kaalmada adedamionyakylee, yolanda lopez. So [facility] [phone].    ATENCIÓN: Si habla español, tiene a rodrigues disposición servicios gratuitos de asistencia lingüística. Llame al [phone].    We comply with applicable federal civil rights laws and [place] laws. We do not discriminate on the basis of race, color, national origin, age, disability, sex, sexual orientation, or gender identity.            Thank you!     Thank you for choosing [facility]  for your care. Our goal is always to provide you with excellent care. Hearing back from our patients is one way we can continue to improve our services. Please take a few minutes to complete the written survey that you may receive in the mail after your visit with us. Thank you!             Your Updated Medication List - Protect others around you: Learn how to safely use, store and throw away your medicines at www.disposemymeds.org.          This list is accurate as of 11/13/18 11:59 PM.  Always use your most recent med list.                   Brand Name Dispense  Instructions for use Diagnosis    ammonium lactate 12 % cream    LAC-HYDRIN    385 g    Apply topically 2 times daily as needed for dry skin    Venous stasis, Type 2 diabetes, controlled, with neuropathy (H)       ascorbic acid 500 MG Tabs     30 tablet    Take 1 tablet (500 mg) by mouth 2 times daily    Ulcer of right lower leg, with fat layer exposed (H)       ASPIRIN PO      Take 81 mg by mouth daily        blood glucose monitoring lancets     3 Box    Use to test blood sugars 2 as directed.    Type 2 diabetes, uncontrolled, with neuropathy (H)       Blood Pressure Kit     1 kit    1 Device daily    Benign essential hypertension       clindamycin 300 MG capsule    CLEOCIN    28 capsule    Take 1 capsule (300 mg) by mouth 2 times daily    Ulcer of right lower extremity with fat layer exposed (H), Venous stasis, Type II or unspecified type diabetes mellitus with neurological manifestations, not stated as uncontrolled(250.60) (H)       * clopidogrel 75 MG tablet    PLAVIX    30 tablet    Take 1 tablet (75 mg) by mouth daily    Peripheral vascular disease, unspecified (H)       * clopidogrel 75 MG tablet    PLAVIX    90 tablet    Take 1 tablet (75 mg) by mouth daily    Peripheral vascular disease (H)       ferrous sulfate 325 (65 Fe) MG tablet    IRON    60 tablet    Take 1 tablet (325 mg) by mouth 2 times daily    Peripheral vascular disease, unspecified (H)       gentamicin 0.1 % cream    GARAMYCIN    30 g    Apply topically daily To right leg ulcer.    Ulcer of right lower leg, with fat layer exposed (H), Chronic venous hypertension with ulcer involving right side (H), Type 2 diabetes, controlled, with neuropathy (H)       insulin glargine 100 UNIT/ML injection    LANTUS SOLOSTAR    3 mL    Inject 16 Units Subcutaneous daily (with dinner) May take up to 22 units daily    Type 2 diabetes mellitus with diabetic peripheral angiopathy without gangrene, with long-term current use of insulin (H)       insulin pen needle  "31G X 8 MM    B-D U/F    100 each    Use 1 daily as directed    Diabetes mellitus, type II (H)       lisinopril 10 MG tablet    PRINIVIL/ZESTRIL    90 tablet    Take 1 tablet (10 mg) by mouth daily    Benign essential hypertension       * nateglinide 120 MG tablet    STARLIX    90 tablet    TAKE 1 TABLET BY MOUTH THREE TIMES DAILY BEFORE MEALS    Type 2 diabetes, controlled, with neuropathy (H)       * nateglinide 120 MG tablet    STARLIX    96 tablet    TAKE 1 TABLET BY MOUTH THREE TIMES DAILY BEFORE MEALS    Diabetes mellitus (H)       ONETOUCH ULTRA test strip   Generic drug:  blood glucose monitoring     200 strip    USE TO TEST TWICE DAILY OR AS DIRECTED    Type 2 diabetes mellitus (H)       OPTIFOAM 6\"X6\" Pads     1 each    1 Box once a week    Ulcer of right leg, with fat layer exposed (H)       order for DME     2 each    Please measure and distribute 1 pair of 20mm Hg - 30mm Hg knee high ULCER CARE open or closed toe compression stockings.  Patient has a size 13 foot and please take this into consideration.  Jobst or equivalent    Varicose veins of lower extremities with other complications, Venous stasis ulcer of right lower extremity (H)       order for DME     3 each    Please measure and distribute 1 pair of 20mmHg - 30mmHg knee high open or closed toe compression stockings. Jobst ultrasheer or equivalent.    Varicose veins of both lower extremities with complications       order for DME     2 each    Please measure and distribute 1 pair of 30mmHg - 40mmHg knee high open toe ulcercare compression stockings. Jobst ultrasheer or equivalent.    Varicose veins of bilateral lower extremities with other complications       oxyCODONE IR 5 MG tablet    ROXICODONE    12 tablet    Take 0.5-1 tablets (2.5-5 mg) by mouth every 4 hours as needed for other (pain control or improvement in physical function. Hold dose for analgesic side effects.)    Acute post-operative pain       sildenafil 50 MG tablet    VIAGRA    " 10 tablet    Take 1 tablet (50 mg) by mouth daily as needed for erectile dysfunction    Vasculogenic erectile dysfunction, unspecified vasculogenic erectile dysfunction type       silver sulfADIAZINE 1 % cream    SILVADENE    85 g    Apply topically daily To affected areas on right foot and leg.    Ulcer of right lower leg, with fat layer exposed (H), Chronic venous hypertension with ulcer involving right side (H), Type 2 diabetes, controlled, with neuropathy (H)       simvastatin 10 MG tablet    ZOCOR    90 tablet    Take 1 tablet (10 mg) by mouth At Bedtime    Type 2 diabetes, controlled, with neuropathy (H)       triamcinolone 0.1 % cream    KENALOG    60 g    Apply sparingly to left heel daily.    Dermatitis of left foot       VITAMIN C PO      Take 1,000 mg by mouth        VITAMIN D (CHOLECALCIFEROL) PO      Take 1,000 Units by mouth 2 times daily        * Notice:  This list has 4 medication(s) that are the same as other medications prescribed for you. Read the directions carefully, and ask your doctor or other care provider to review them with you.

## 2018-11-14 ENCOUNTER — TELEPHONE (OUTPATIENT)
Dept: CARDIOLOGY | Facility: CLINIC | Age: 73
End: 2018-11-14

## 2018-11-14 NOTE — TELEPHONE ENCOUNTER
nateglinide (STARLIX) 120 MG tablet      Last Written Prescription Date:  10-1-18  Last Fill Quantity: 96,   # refills: 0  Last Office Visit : 11-5-18  Future Office visit:  1-10-19    Routing refill request to provider for review/approval because:  Overdue labs: LDL,ACT,AST. Previous refill given.  Lab orders in [facility], no lab appt.

## 2018-11-14 NOTE — TELEPHONE ENCOUNTER
Patient called with 2 concerns:    1)  He is scheduled for a tooth extraction on 11/21.  He is on Plavix and aspirin due to stent placement 3/2018.  Wants to know if he is going to need to stop Plavix and aspirin prior.  He is having his tooth extraction done by Dr. Kristy Thornton at [phone].   Writer will call the dentist tomorrow to see if he can perform the tooth extraction while patient is on plavix and aspirin.    2)  Patient also needs a colonoscopy with polyp removal.  GI MD is Dr. Hines.  Needs to know if it is ok to hold Plavix x7 days prior to colonoscopy since it has been over 8 months that he has been on Plavix uninterrupted.  Dr. Chinchilla will have to address this.    Kathleen Payne, RN  Care Coordinator  [facility]  [phone]

## 2018-11-15 RX ORDER — NATEGLINIDE 120 MG/1
TABLET ORAL
Qty: 90 TABLET | Refills: 0 | Status: SHIPPED | OUTPATIENT
Start: 2018-11-15 | End: 2018-12-28

## 2018-11-15 NOTE — TELEPHONE ENCOUNTER
Writer spoke with Dr. Kristy Thornton, whom states that she can perform the tooth extraction while patient is on Plavix and aspirin.      Writer will discuss with Dr. Chinchilla regarding colonoscopy.    Kathleen Payne, RN  Care Coordinator  [facility]  [phone]

## 2018-11-15 NOTE — TELEPHONE ENCOUNTER
Message sent to Dr. Gautam to advise.  Kathleen Payne RN      ----- Message from Rubio Chinchilla MD sent at 11/15/2018  4:04 PM CST -----  He has stent in his left main coronary artery. The polyps are low risk I believe. I recommend him to be on DAPT for at least a year. He will be one year in 3/2019.  He can stop plavix and undergo colonoscopy after that.  If the colonoscopy is urgent, then he can hold plavix for 5 days and undergo procedure.     ----- Message -----     From: Kathleen Payne RN     Sent: 11/15/2018   1:28 PM       To: Rubio Chinchilla MD    Can Amos get his colonoscopy now?  He has been on Plavix since March 2018 and polyps need to be removed.  Dr. Hines will be preforming the colonoscopy.  Kathleen

## 2018-11-16 DIAGNOSIS — D62 ANEMIA DUE TO BLOOD LOSS, ACUTE: ICD-10-CM

## 2018-11-16 DIAGNOSIS — I73.9 PVD (PERIPHERAL VASCULAR DISEASE) (H): ICD-10-CM

## 2018-11-16 DIAGNOSIS — Z79.4 TYPE 2 DIABETES MELLITUS WITH DIABETIC PERIPHERAL ANGIOPATHY WITHOUT GANGRENE, WITH LONG-TERM CURRENT USE OF INSULIN (H): ICD-10-CM

## 2018-11-16 DIAGNOSIS — E11.51 TYPE 2 DIABETES MELLITUS WITH DIABETIC PERIPHERAL ANGIOPATHY WITHOUT GANGRENE, WITH LONG-TERM CURRENT USE OF INSULIN (H): ICD-10-CM

## 2018-11-16 DIAGNOSIS — E11.40 TYPE 2 DIABETES, CONTROLLED, WITH NEUROPATHY (H): ICD-10-CM

## 2018-11-16 DIAGNOSIS — L98.499 CHRONIC SKIN ULCER, UNSPECIFIED ULCER STAGE (H): ICD-10-CM

## 2018-11-16 LAB
CHOLEST SERPL-MCNC: 100 MG/DL
ERYTHROCYTE [DISTWIDTH] IN BLOOD BY AUTOMATED COUNT: 13.2 % (ref 10–15)
HBA1C MFR BLD: 7.2 % (ref 0–5.6)
HCT VFR BLD AUTO: 35.8 % (ref 40–53)
HDLC SERPL-MCNC: 48 MG/DL
HGB BLD-MCNC: 11.5 G/DL (ref 13.3–17.7)
LDLC SERPL CALC-MCNC: 42 MG/DL
MCH RBC QN AUTO: 30.9 PG (ref 26.5–33)
MCHC RBC AUTO-ENTMCNC: 32.1 G/DL (ref 31.5–36.5)
MCV RBC AUTO: 96 FL (ref 78–100)
NONHDLC SERPL-MCNC: 52 MG/DL
PLATELET # BLD AUTO: 246 10E9/L (ref 150–450)
RBC # BLD AUTO: 3.72 10E12/L (ref 4.4–5.9)
TRIGL SERPL-MCNC: 50 MG/DL
WBC # BLD AUTO: 5.5 10E9/L (ref 4–11)

## 2018-11-16 PROCEDURE — 85027 COMPLETE CBC AUTOMATED: CPT | Performed by: INTERNAL MEDICINE

## 2018-11-16 PROCEDURE — 36415 COLL VENOUS BLD VENIPUNCTURE: CPT | Performed by: INTERNAL MEDICINE

## 2018-11-16 PROCEDURE — 83036 HEMOGLOBIN GLYCOSYLATED A1C: CPT | Performed by: INTERNAL MEDICINE

## 2018-11-16 PROCEDURE — 80061 LIPID PANEL: CPT | Performed by: INTERNAL MEDICINE

## 2018-11-16 NOTE — TELEPHONE ENCOUNTER
Received new message from Dr. Gautam about pt's colonoscopy and new orders given:    Yes - this can wait until safer to hold the Plavix.     VITO Gautam MD    Spoke to pt who verbalized understanding to wait until 3/2019 to have colonoscopy. Pt will call clinic when it is closer to his colonoscopy to receive final orders on how to hold his Plavix then.    Laura Pressley RN

## 2018-11-19 ENCOUNTER — TELEPHONE (OUTPATIENT)
Dept: CARDIOLOGY | Facility: CLINIC | Age: 73
End: 2018-11-19

## 2018-11-19 NOTE — TELEPHONE ENCOUNTER
----- Message from Rickie Gautam MD sent at 11/15/2018  5:11 PM CST -----  Yes - this can wait until safer to hold the Plavix.    VITO Gautam MD  Associate Professor of Medicine  Division of Gastroenterology, Hepatology and Nutrition  [facility]    ----- Message -----     From: Kathleen Payne RN     Sent: 11/15/2018   4:48 PM       To: Rickie Gautam MD, Laura Pressley RN, #    Dr. Gautam,    [place] patient whom is on Plavix is needing a colonoscopy for polyp removal.  Please see Dr. Chinchilla's recommendations below.  Is it ok for him to wait until March 2019 for polyp removal?  Kathleen Payne RN  Care Coordinator  [facility]  [phone]    ----- Message -----     From: Rubio Chinchilla MD     Sent: 11/15/2018   4:04 PM       To: Kathleen Payne RN    He has stent in his left main coronary artery. The polyps are low risk I believe. I recommend him to be on DAPT for at least a year. He will be one year in 3/2019.  He can stop plavix and undergo colonoscopy after that.  If the colonoscopy is urgent, then he can hold plavix for 5 days and undergo procedure.     ----- Message -----     From: Kathleen Payne RN     Sent: 11/15/2018   1:28 PM       To: Rubio Chinchilla MD    Can Amos get his colonoscopy now?  He has been on Plavix since March 2018 and polyps need to be removed.  Dr. Hines will be preforming the colonoscopy.  Kathleen

## 2018-11-20 ENCOUNTER — OFFICE VISIT (OUTPATIENT)
Dept: PODIATRY | Facility: CLINIC | Age: 73
End: 2018-11-20
Payer: MEDICARE

## 2018-11-20 VITALS
DIASTOLIC BLOOD PRESSURE: 67 MMHG | HEART RATE: 92 BPM | SYSTOLIC BLOOD PRESSURE: 149 MMHG | RESPIRATION RATE: 16 BRPM | OXYGEN SATURATION: 99 %

## 2018-11-20 DIAGNOSIS — L97.912 ULCER OF RIGHT LOWER EXTREMITY WITH FAT LAYER EXPOSED (H): Primary | ICD-10-CM

## 2018-11-20 DIAGNOSIS — L97.512 SKIN ULCER OF RIGHT FOOT WITH FAT LAYER EXPOSED (H): ICD-10-CM

## 2018-11-20 PROCEDURE — 15271 SKIN SUB GRAFT TRNK/ARM/LEG: CPT | Performed by: PODIATRIST

## 2018-11-20 ASSESSMENT — PAIN SCALES - GENERAL: PAINLEVEL: NO PAIN (0)

## 2018-11-20 NOTE — MR AVS SNAPSHOT
After Visit Summary   2018    Amos Walker    MRN: [id number]           Patient Information     Date Of Birth          1945        Visit Information        Provider Department      2018 2:30 PM Brayan Mcclain DPM [facility]        Today's Diagnoses     Ulcer of right lower extremity with fat layer exposed (H)    -  1    Skin ulcer of right foot with fat layer exposed (H)        Chronic venous hypertension with ulcer involving right side (H)          Care Instructions    Thanks for coming today.  Ortho[facility] Medicine Clinic  [address] [place] [address]    To schedule future appointments in Ortho Clinic, you may call [phone].    To schedule ordered imaging by your provider:   Call Central Imaging [phone]    To schedule an injection ordered by your provider:  Call Central Imaging Injection scheduling line: [phone]  [email or web address] available online at:  [email or web address]    Please call if any further questions or concerns ([phone]).  Clinic hours 8 am to 5 pm.    Return to clinic (call) if symptoms worsen or fail to improve.            Follow-ups after your visit        Your next 10 appointments already scheduled     2018  2:30 PM CST   Return Visit with DONNELL Pena [facility] ([facility])    [address] [place] [id number] [phone]            Dec 04, 2018  3:00 PM CST   Return Visit with Brayan Mcclain DPM   [facility] ([facility])    [address] [place] [id number] [phone]            Dec 11, 2018  2:30 PM CST   Return Visit with Brayan Mcclain DPM   [facility] ([facility])    [address] [address] [id number] [phone]            Dec 18, 2018  2:30 PM CST   Return Visit with Brayan Mcclain DPM   [facility] (  [facility])    [address] [place] [address]   [phone]            Jan 02, 2019  2:30 PM CST   Return Visit with Brayan Mcclain DPM   [facility] ([facility])    [address] [place] [address]   [phone]            Anam 10, 2019 10:25 AM CST   (Arrive by 10:10 AM)   Return Visit with Racheal Swift MD   [facility] ([facility])    [address] [place] [address]  [address] [facility] [address]   [phone]            May 09, 2019  2:40 PM CDT   (Arrive by 2:25 PM)   Return Vascular Visit with Augusto Maharaj MD   [facility] ([facility])    [address] [place] [address]  [address] [facility] [address]   [phone]            Jun 20, 2019 10:30 AM CDT   RETURN RETINA with Jen Aranda MD   [facility] ([facility])    [address]  [facility]  [facility] [address]   [phone]              Who to contact     If you have questions or need follow up information about today's clinic visit or your schedule please contact [facility] directly at [phone].  Normal or non-critical lab and imaging results will be communicated to you by MyChart, letter or phone within 4 business days after the clinic has received the results. If you do not hear from us within 7 days, please contact the clinic through MyChart or phone. If you have a critical or abnormal lab result, we will notify you by phone as soon as possible.  Submit refill requests through [employer] or call your pharmacy and they will forward the refill request to us. Please allow 3 business days for your refill to be completed.          Additional Information About Your Visit        ClearCarehart Information     [employer] gives you secure access to your electronic health record. If you see a primary care  provider, you can also send messages to your care team and make appointments. If you have questions, please call your primary care clinic.  If you do not have a primary care provider, please call [phone] and they will assist you.      [employer] is an electronic gateway that provides easy, online access to your medical records. With [employer], you can request a clinic appointment, read your test results, renew a prescription or communicate with your care team.     To access your existing account, please contact your [facility] or call [phone] for assistance.        Care EveryWhere ID     This is your Care EveryWhere ID. This could be used by other organizations to access your [place] medical records  [id number]        Your Vitals Were     Pulse Respirations Pulse Oximetry             92 16 99%          Blood Pressure from Last 3 Encounters:   11/20/18 149/67   11/13/18 125/60   11/08/18 137/64    Weight from Last 3 Encounters:   11/05/18 75.3 kg (166 lb)   09/18/18 75.8 kg (167 lb)   09/15/18 76.6 kg (168 lb 12.8 oz)              We Performed the Following     HC SKIN SUB GRAFT T/A/L AREA/<100SCM /<1ST 25 SCM     TISSUE APLIGRAF SUPPLY, PER SQ CM          Today's Medication Changes          These changes are accurate as of 11/20/18  3:41 PM.  If you have any questions, ask your nurse or doctor.               These medicines have changed or have updated prescriptions.        Dose/Directions    * clopidogrel 75 MG tablet   Commonly known as:  PLAVIX   This may have changed:  when to take this   Used for:  Peripheral vascular disease, unspecified (H)        Dose:  75 mg   Take 1 tablet (75 mg) by mouth daily   Quantity:  30 tablet   Refills:  11       * clopidogrel 75 MG tablet   Commonly known as:  PLAVIX   This may have changed:  Another medication with the same name was changed. Make sure you understand how and when to take each.   Used for:  Peripheral vascular disease (H)         Dose:  75 mg   Take 1 tablet (75 mg) by mouth daily   Quantity:  90 tablet   Refills:  0       gentamicin 0.1 % cream   Commonly known as:  GARAMYCIN   This may have changed:    - when to take this  - reasons to take this  - additional instructions   Used for:  Ulcer of right lower leg, with fat layer exposed (H), Chronic venous hypertension with ulcer involving right side (H), Type 2 diabetes, controlled, with neuropathy (H)        Apply topically daily To right leg ulcer.   Quantity:  30 g   Refills:  5       lisinopril 10 MG tablet   Commonly known as:  PRINIVIL/ZESTRIL   This may have changed:  when to take this   Used for:  Benign essential hypertension        Dose:  10 mg   Take 1 tablet (10 mg) by mouth daily   Quantity:  90 tablet   Refills:  3       * Notice:  This list has 2 medication(s) that are the same as other medications prescribed for you. Read the directions carefully, and ask your doctor or other care provider to review them with you.             Primary Care Provider Office Phone # Fax #    Racheal Swift [phone]       0 [address] [address]        Equal Access to Services     [facility] : Hadii niurka ferrell Soger, waaxda andrae, qaybta kaalchino jeffery, yolanda lopez. So [facility] [phone].    ATENCIÓN: Si habla español, tiene a rodrigues disposición servicios gratuitos de asistencia lingüística. [facility] [phone].    We comply with applicable federal civil rights laws and [place] laws. We do not discriminate on the basis of race, color, national origin, age, disability, sex, sexual orientation, or gender identity.            Thank you!     Thank you for choosing [facility]  for your care. Our goal is always to provide you with excellent care. Hearing back from our patients is one way we can continue to improve our services. Please take a few minutes to complete the written survey that you  may receive in the mail after your visit with us. Thank you!             Your Updated Medication List - Protect others around you: Learn how to safely use, store and throw away your medicines at www.disposemymeds.org.          This list is accurate as of 11/20/18  3:41 PM.  Always use your most recent med list.                   Brand Name Dispense Instructions for use Diagnosis    ammonium lactate 12 % cream    LAC-HYDRIN    385 g    Apply topically 2 times daily as needed for dry skin    Venous stasis, Type 2 diabetes, controlled, with neuropathy (H)       ascorbic acid 500 MG Tabs     30 tablet    Take 1 tablet (500 mg) by mouth 2 times daily    Ulcer of right lower leg, with fat layer exposed (H)       ASPIRIN PO      Take 81 mg by mouth daily        blood glucose monitoring lancets     3 Box    Use to test blood sugars 2 as directed.    Type 2 diabetes, uncontrolled, with neuropathy (H)       Blood Pressure Kit     1 kit    1 Device daily    Benign essential hypertension       clindamycin 300 MG capsule    CLEOCIN    28 capsule    Take 1 capsule (300 mg) by mouth 2 times daily    Ulcer of right lower extremity with fat layer exposed (H), Venous stasis, Type II or unspecified type diabetes mellitus with neurological manifestations, not stated as uncontrolled(250.60) (H)       * clopidogrel 75 MG tablet    PLAVIX    30 tablet    Take 1 tablet (75 mg) by mouth daily    Peripheral vascular disease, unspecified (H)       * clopidogrel 75 MG tablet    PLAVIX    90 tablet    Take 1 tablet (75 mg) by mouth daily    Peripheral vascular disease (H)       ferrous sulfate 325 (65 Fe) MG tablet    IRON    60 tablet    Take 1 tablet (325 mg) by mouth 2 times daily    Peripheral vascular disease, unspecified (H)       gentamicin 0.1 % cream    GARAMYCIN    30 g    Apply topically daily To right leg ulcer.    Ulcer of right lower leg, with fat layer exposed (H), Chronic venous hypertension with ulcer involving right side (H),  "Type 2 diabetes, controlled, with neuropathy (H)       insulin glargine 100 UNIT/ML injection    LANTUS SOLOSTAR    3 mL    Inject 16 Units Subcutaneous daily (with dinner) May take up to 22 units daily    Type 2 diabetes mellitus with diabetic peripheral angiopathy without gangrene, with long-term current use of insulin (H)       insulin pen needle 31G X 8 MM miscellaneous    B-D U/F    100 each    Use 1 daily as directed    Diabetes mellitus, type II (H)       lisinopril 10 MG tablet    PRINIVIL/ZESTRIL    90 tablet    Take 1 tablet (10 mg) by mouth daily    Benign essential hypertension       * nateglinide 120 MG tablet    STARLIX    90 tablet    TAKE 1 TABLET BY MOUTH THREE TIMES DAILY BEFORE MEALS    Type 2 diabetes, controlled, with neuropathy (H)       * nateglinide 120 MG tablet    STARLIX    90 tablet    TAKE 1 TABLET BY MOUTH THREE TIMES DAILY BEFORE MEALS    Diabetes mellitus (H)       ONETOUCH ULTRA test strip   Generic drug:  blood glucose monitoring     200 strip    USE TO TEST TWICE DAILY OR AS DIRECTED    Type 2 diabetes mellitus (H)       OPTIFOAM 6\"X6\" Pads     1 each    1 Box once a week    Ulcer of right leg, with fat layer exposed (H)       order for DME     2 each    Please measure and distribute 1 pair of 20mm Hg - 30mm Hg knee high ULCER CARE open or closed toe compression stockings.  Patient has a size 13 foot and please take this into consideration.  Jobst or equivalent    Varicose veins of lower extremities with other complications, Venous stasis ulcer of right lower extremity (H)       order for DME     3 each    Please measure and distribute 1 pair of 20mmHg - 30mmHg knee high open or closed toe compression stockings. Jobst ultrasheer or equivalent.    Varicose veins of both lower extremities with complications       order for DME     2 each    Please measure and distribute 1 pair of 30mmHg - 40mmHg knee high open toe ulcercare compression stockings. Jobst ultrasheer or equivalent.    " Varicose veins of bilateral lower extremities with other complications       oxyCODONE IR 5 MG tablet    ROXICODONE    12 tablet    Take 0.5-1 tablets (2.5-5 mg) by mouth every 4 hours as needed for other (pain control or improvement in physical function. Hold dose for analgesic side effects.)    Acute post-operative pain       sildenafil 50 MG tablet    VIAGRA    10 tablet    Take 1 tablet (50 mg) by mouth daily as needed for erectile dysfunction    Vasculogenic erectile dysfunction, unspecified vasculogenic erectile dysfunction type       silver sulfADIAZINE 1 % cream    SILVADENE    85 g    Apply topically daily To affected areas on right foot and leg.    Ulcer of right lower leg, with fat layer exposed (H), Chronic venous hypertension with ulcer involving right side (H), Type 2 diabetes, controlled, with neuropathy (H)       simvastatin 10 MG tablet    ZOCOR    90 tablet    Take 1 tablet (10 mg) by mouth At Bedtime    Type 2 diabetes, controlled, with neuropathy (H)       triamcinolone 0.1 % cream    KENALOG    60 g    Apply sparingly to left heel daily.    Dermatitis of left foot       VITAMIN C PO      Take 1,000 mg by mouth        VITAMIN D (CHOLECALCIFEROL) PO      Take 1,000 Units by mouth 2 times daily        * Notice:  This list has 4 medication(s) that are the same as other medications prescribed for you. Read the directions carefully, and ask your doctor or other care provider to review them with you.
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
## 2018-11-20 NOTE — NURSING NOTE
Amos Walker's chief complaint for this visit includes:  Chief Complaint   Patient presents with     RECHECK     right leg ulcer - Apligraf ordered.     PCP: Racheal Swift    Referring Provider:  No referring provider defined for this encounter.    /67 (BP Location: Left arm, Patient Position: Sitting, Cuff Size: Adult Large)  Pulse 92  Resp 16  SpO2 99%  No Pain (0)     Do you need any medication refills at today's visit? Yesenia Brown CMA

## 2018-11-20 NOTE — PATIENT INSTRUCTIONS
Thanks for coming today.  Ortho[facility] Medicine Clinic  [address] [place] [address]    To schedule future appointments in Ortho Clinic, you may call [phone].    To schedule ordered imaging by your provider:   Call Central Imaging [phone]    To schedule an injection ordered by your provider:  Call Central Imaging Injection scheduling line: [phone]  [email or web address] available online at:  [email or web address]    Please call if any further questions or concerns ([phone]).  Clinic hours 8 am to 5 pm.    Return to clinic (call) if symptoms worsen or fail to improve.
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
## 2018-11-21 DIAGNOSIS — I87.8 VENOUS STASIS: ICD-10-CM

## 2018-11-21 DIAGNOSIS — E11.40 TYPE 2 DIABETES, CONTROLLED, WITH NEUROPATHY (H): ICD-10-CM

## 2018-11-21 RX ORDER — AMMONIUM LACTATE 12 G/100G
CREAM TOPICAL 2 TIMES DAILY PRN
Qty: 385 G | Refills: 3 | Status: SHIPPED | OUTPATIENT
Start: 2018-11-21 | End: 2020-09-17

## 2018-11-21 NOTE — TELEPHONE ENCOUNTER
ammonium lactate (LAC-HYDRIN) 12 % cream 385 g 3 7/24/2018       Last Written Prescription Date:  07/24/2018  Last Fill Quantity: 385g,   # refills: 3  Last Office Visit: 11/20/2018  Future Office visit:    Next 5 appointments (look out 90 days)     Nov 27, 2018  2:30 PM CST   Return Visit with Brayan Mcclain DPM   [facility]    [address] [facility] [address]   [phone]            Dec 04, 2018  3:00 PM CST   Return Visit with Brayan Mcclain DPM   [facility]    [address] [facility] [address] [phone]            Dec 11, 2018  2:30 PM CST   Return Visit with Brayan Mcclain DPM   [facility]    [address] [facility] [address] [phone]            Dec 18, 2018  2:30 PM CST   Return Visit with Brayan Mcclain DPM   [facility]    [address] [facility] [address] [phone]            Jan 02, 2019  2:30 PM CST   Return Visit with Brayan Mcclain DPM   [facility]    [address] [address] [phone]

## 2018-11-27 ENCOUNTER — OFFICE VISIT (OUTPATIENT)
Dept: PODIATRY | Facility: CLINIC | Age: 73
End: 2018-11-27
Payer: MEDICARE

## 2018-11-27 VITALS
TEMPERATURE: 97.4 F | DIASTOLIC BLOOD PRESSURE: 71 MMHG | SYSTOLIC BLOOD PRESSURE: 145 MMHG | HEART RATE: 105 BPM | OXYGEN SATURATION: 100 %

## 2018-11-27 DIAGNOSIS — L97.912 ULCER OF RIGHT LOWER EXTREMITY WITH FAT LAYER EXPOSED (H): Primary | ICD-10-CM

## 2018-11-27 DIAGNOSIS — E11.49 TYPE II OR UNSPECIFIED TYPE DIABETES MELLITUS WITH NEUROLOGICAL MANIFESTATIONS, NOT STATED AS UNCONTROLLED(250.60) (H): ICD-10-CM

## 2018-11-27 DIAGNOSIS — E11.51 DIABETES MELLITUS WITH PERIPHERAL VASCULAR DISEASE (H): ICD-10-CM

## 2018-11-27 PROCEDURE — 99212 OFFICE O/P EST SF 10 MIN: CPT | Performed by: PODIATRIST
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
## 2018-11-27 NOTE — PATIENT INSTRUCTIONS
Thanks for coming today.  [facility]  [address] [place] [address]    To schedule future appointments in Ortho Clinic, you may call [phone].    To schedule ordered imaging by your provider:   Call Central Imaging [phone]    To schedule an injection ordered by your provider:  Call Central Imaging Injection scheduling line: [phone]  [email or web address] available online at:  [email or web address]    Please call if any further questions or concerns ([phone]).  Clinic hours 8 am to 5 pm.    Return to clinic (call) if symptoms worsen or fail to improve.

## 2018-11-27 NOTE — NURSING NOTE
Amos Walker's chief complaint for this visit includes:  Chief Complaint   Patient presents with     RECHECK     Follow up for right leg ulcer     PCP: Racheal Swift    Referring Provider:  No referring provider defined for this encounter.    /71  Pulse 105  Temp 97.4  F (36.3  C)  SpO2 100%  Data Unavailable     Do you need any medication refills at today's visit? Yes
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
## 2018-11-27 NOTE — MR AVS SNAPSHOT
After Visit Summary   2018    Amos Walker    MRN: [id number]           Patient Information     Date Of Birth          1945        Visit Information        Provider Department      2018 2:30 PM Brayan Mcclain DPM [facility]        Today's Diagnoses     Ulcer of right lower extremity with fat layer exposed (H)    -  1    Type II or unspecified type diabetes mellitus with neurological manifestations, not stated as uncontrolled(250.60) (H)        Diabetes mellitus with peripheral vascular disease (H)        Chronic venous hypertension with ulcer involving right side (H)          Care Instructions    Thanks for coming today.  [facility]  [address] [place] [address]    To schedule future appointments in Ortho Clinic, you may call [phone].    To schedule ordered imaging by your provider:   Call Central Imaging [phone]    To schedule an injection ordered by your provider:  Call Central Imaging Injection scheduling line: [phone]  [employer] available online at:  [email or web address]    Please call if any further questions or concerns ([phone]).  Clinic hours 8 am to 5 pm.    Return to clinic (call) if symptoms worsen or fail to improve.          Follow-ups after your visit        Your next 10 appointments already scheduled     Dec 04, 2018  3:00 PM CST   Return Visit with Brayan Mcclain DPM   [facility]    [address] [place] [address] [phone]            Dec 11, 2018  2:30 PM CST   Return Visit with Brayan Mcclain DPM   [facility])    [address] [place] [address] [phone]            Dec 18, 2018  2:30 PM CST   Return Visit with Brayan Mcclain DPM   [facility])    [address] [place] [address]    [phone]            Jan 02, 2019  2:30 PM CST   Return Visit with Brayan Mcclain DPM   [facility]    [address] [place] [id number] [phone]            Anam 10, 2019 10:25 AM CST   (Arrive by 10:10 AM)   Return Visit with Racheal Swift MD   [facility] ([facility])    [address] [place] [address]  [address]th [facility] [id number] [phone]            May 09, 2019  2:40 PM CDT   (Arrive by 2:25 PM)   Return Vascular Visit with Augusto Maharaj MD   [facility] ([facility])    [address] [place] [address]  3rd [facility] [id number] [phone]            Jun 20, 2019 10:30 AM CDT   RETURN RETINA with Jen Aranda MD   [facility] ([facility])    [address] [id number] [phone]              Who to contact     If you have questions or need follow up information about today's clinic visit or your schedule please contact [facility] directly at [phone].  Normal or non-critical lab and imaging results will be communicated to you by MyChart, letter or phone within 4 business days after the clinic has received the results. If you do not hear from us within 7 days, please contact the clinic through MyChart or phone. If you have a critical or abnormal lab result, we will notify you by phone as soon as possible.  Submit refill requests through [employer] or call your pharmacy and they will forward the refill request to us. Please allow 3 business days for your refill to be completed.          Additional Information About Your Visit        [employer] Information     [employer] gives you secure access to your electronic health record. If you see a primary care provider, you can also send messages to your care team and make appointments. If you  have questions, please call your primary care clinic.  If you do not have a primary care provider, please call [phone] and they will assist you.      [employer] is an electronic gateway that provides easy, online access to your medical records. With [employer], you can request a clinic appointment, read your test results, renew a prescription or communicate with your care team.     To access your existing account, please contact your [facility] or call [phone] for assistance.        Care EveryWhere ID     This is your Care EveryWhere ID. This could be used by other organizations to access your [facility] medical records  [id number]        Your Vitals Were     Pulse Temperature Pulse Oximetry             105 97.4  F (36.3  C) 100%          Blood Pressure from Last 3 Encounters:   11/27/18 145/71   11/20/18 149/67   11/13/18 125/60    Weight from Last 3 Encounters:   11/05/18 75.3 kg (166 lb)   09/18/18 75.8 kg (167 lb)   09/15/18 76.6 kg (168 lb 12.8 oz)              Today, you had the following     No orders found for display         Today's Medication Changes          These changes are accurate as of 11/27/18 11:59 PM.  If you have any questions, ask your nurse or doctor.               These medicines have changed or have updated prescriptions.        Dose/Directions    * clopidogrel 75 MG tablet   Commonly known as:  PLAVIX   This may have changed:  when to take this   Used for:  Peripheral vascular disease, unspecified (H)        Dose:  75 mg   Take 1 tablet (75 mg) by mouth daily   Quantity:  30 tablet   Refills:  11       * clopidogrel 75 MG tablet   Commonly known as:  PLAVIX   This may have changed:  Another medication with the same name was changed. Make sure you understand how and when to take each.   Used for:  Peripheral vascular disease (H)        Dose:  75 mg   Take 1 tablet (75 mg) by mouth daily   Quantity:  90 tablet   Refills:  0       gentamicin 0.1 % external  cream   Commonly known as:  GARAMYCIN   This may have changed:    - when to take this  - reasons to take this  - additional instructions   Used for:  Ulcer of right lower leg, with fat layer exposed (H), Chronic venous hypertension with ulcer involving right side (H), Type 2 diabetes, controlled, with neuropathy (H)        Apply topically daily To right leg ulcer.   Quantity:  30 g   Refills:  5       lisinopril 10 MG tablet   Commonly known as:  PRINIVIL/ZESTRIL   This may have changed:  when to take this   Used for:  Benign essential hypertension        Dose:  10 mg   Take 1 tablet (10 mg) by mouth daily   Quantity:  90 tablet   Refills:  3       * Notice:  This list has 2 medication(s) that are the same as other medications prescribed for you. Read the directions carefully, and ask your doctor or other care provider to review them with you.             Primary Care Provider Office Phone # Fax #    Racheal Swift [phone] [address] [address]        Equal Access to Services     SAMSON MELTON : Hadii niurka webb hadasho Soomaali, waaxda luqadaha, qaybta kaalmada adeegyada, waxay bhargavi duran . So [facility] [phone].    ATENCIÓN: Si habla español, tiene a rodrigues disposición servicios gratuitos de asistencia lingüística. Ll[facility] [phone].    We comply with applicable federal civil rights laws and [place] laws. We do not discriminate on the basis of race, color, national origin, age, disability, sex, sexual orientation, or gender identity.            Thank you!     Thank you for choosing [facility]  for your care. Our goal is always to provide you with excellent care. Hearing back from our patients is one way we can continue to improve our services. Please take a few minutes to complete the written survey that you may receive in the mail after your visit with us. Thank you!             Your Updated Medication List - Protect  others around you: Learn how to safely use, store and throw away your medicines at www.disposemymeds.org.          This list is accurate as of 11/27/18 11:59 PM.  Always use your most recent med list.                   Brand Name Dispense Instructions for use Diagnosis    ammonium lactate 12 % external cream    LAC-HYDRIN    385 g    Apply topically 2 times daily as needed for dry skin    Venous stasis, Type 2 diabetes, controlled, with neuropathy (H)       ascorbic acid 500 MG Tabs     30 tablet    Take 1 tablet (500 mg) by mouth 2 times daily    Ulcer of right lower leg, with fat layer exposed (H)       ASPIRIN PO      Take 81 mg by mouth daily        blood glucose monitoring lancets     3 Box    Use to test blood sugars 2 as directed.    Type 2 diabetes, uncontrolled, with neuropathy (H)       Blood Pressure Kit     1 kit    1 Device daily    Benign essential hypertension       clindamycin 300 MG capsule    CLEOCIN    28 capsule    Take 1 capsule (300 mg) by mouth 2 times daily    Ulcer of right lower extremity with fat layer exposed (H), Venous stasis, Type II or unspecified type diabetes mellitus with neurological manifestations, not stated as uncontrolled(250.60) (H)       * clopidogrel 75 MG tablet    PLAVIX    30 tablet    Take 1 tablet (75 mg) by mouth daily    Peripheral vascular disease, unspecified (H)       * clopidogrel 75 MG tablet    PLAVIX    90 tablet    Take 1 tablet (75 mg) by mouth daily    Peripheral vascular disease (H)       ferrous sulfate 325 (65 Fe) MG tablet    FEROSUL    60 tablet    Take 1 tablet (325 mg) by mouth 2 times daily    Peripheral vascular disease, unspecified (H)       gentamicin 0.1 % external cream    GARAMYCIN    30 g    Apply topically daily To right leg ulcer.    Ulcer of right lower leg, with fat layer exposed (H), Chronic venous hypertension with ulcer involving right side (H), Type 2 diabetes, controlled, with neuropathy (H)       insulin glargine 100 UNIT/ML pen     "LANTUS SOLOSTAR    3 mL    Inject 16 Units Subcutaneous daily (with dinner) May take up to 22 units daily    Type 2 diabetes mellitus with diabetic peripheral angiopathy without gangrene, with long-term current use of insulin (H)       insulin pen needle 31G X 8 MM miscellaneous    B-D U/F    100 each    Use 1 daily as directed    Diabetes mellitus, type II (H)       lisinopril 10 MG tablet    PRINIVIL/ZESTRIL    90 tablet    Take 1 tablet (10 mg) by mouth daily    Benign essential hypertension       * nateglinide 120 MG tablet    STARLIX    90 tablet    TAKE 1 TABLET BY MOUTH THREE TIMES DAILY BEFORE MEALS    Type 2 diabetes, controlled, with neuropathy (H)       * nateglinide 120 MG tablet    STARLIX    90 tablet    TAKE 1 TABLET BY MOUTH THREE TIMES DAILY BEFORE MEALS    Diabetes mellitus (H)       ONETOUCH ULTRA test strip   Generic drug:  blood glucose monitoring     200 strip    USE TO TEST TWICE DAILY OR AS DIRECTED    Type 2 diabetes mellitus (H)       OPTIFOAM 6\"X6\" Pads     1 each    1 Box once a week    Ulcer of right leg, with fat layer exposed (H)       order for DME     2 each    Please measure and distribute 1 pair of 20mm Hg - 30mm Hg knee high ULCER CARE open or closed toe compression stockings.  Patient has a size 13 foot and please take this into consideration.  Jobst or equivalent    Varicose veins of lower extremities with other complications, Venous stasis ulcer of right lower extremity (H)       order for DME     3 each    Please measure and distribute 1 pair of 20mmHg - 30mmHg knee high open or closed toe compression stockings. Jobst ultrasheer or equivalent.    Varicose veins of both lower extremities with complications       order for DME     2 each    Please measure and distribute 1 pair of 30mmHg - 40mmHg knee high open toe ulcercare compression stockings. Jobst ultrasheer or equivalent.    Varicose veins of bilateral lower extremities with other complications       oxyCODONE 5 MG tablet "    ROXICODONE    12 tablet    Take 0.5-1 tablets (2.5-5 mg) by mouth every 4 hours as needed for other (pain control or improvement in physical function. Hold dose for analgesic side effects.)    Acute post-operative pain       sildenafil 50 MG tablet    VIAGRA    10 tablet    Take 1 tablet (50 mg) by mouth daily as needed for erectile dysfunction    Vasculogenic erectile dysfunction, unspecified vasculogenic erectile dysfunction type       silver sulfADIAZINE 1 % external cream    SILVADENE    85 g    Apply topically daily To affected areas on right foot and leg.    Ulcer of right lower leg, with fat layer exposed (H), Chronic venous hypertension with ulcer involving right side (H), Type 2 diabetes, controlled, with neuropathy (H)       simvastatin 10 MG tablet    ZOCOR    90 tablet    Take 1 tablet (10 mg) by mouth At Bedtime    Type 2 diabetes, controlled, with neuropathy (H)       triamcinolone 0.1 % external cream    KENALOG    60 g    Apply sparingly to left heel daily.    Dermatitis of left foot       VITAMIN C PO      Take 1,000 mg by mouth        VITAMIN D (CHOLECALCIFEROL) PO      Take 1,000 Units by mouth 2 times daily        * Notice:  This list has 4 medication(s) that are the same as other medications prescribed for you. Read the directions carefully, and ask your doctor or other care provider to review them with you.

## 2018-11-28 ENCOUNTER — MYC REFILL (OUTPATIENT)
Dept: INTERNAL MEDICINE | Facility: CLINIC | Age: 73
End: 2018-11-28

## 2018-11-28 DIAGNOSIS — L30.9 DERMATITIS OF LEFT FOOT: ICD-10-CM

## 2018-11-28 DIAGNOSIS — I73.9 PERIPHERAL VASCULAR DISEASE (H): ICD-10-CM

## 2018-11-28 RX ORDER — TRIAMCINOLONE ACETONIDE 1 MG/G
CREAM TOPICAL
Qty: 60 G | Refills: 1 | Status: SHIPPED | OUTPATIENT
Start: 2018-11-28 | End: 2019-04-29

## 2018-11-28 NOTE — TELEPHONE ENCOUNTER
Hello,  last fill date:09-  Last quantity:60    Thank You,  Alpa Emery  Pharmacy Technician  [facility]  [phone]

## 2018-11-29 RX ORDER — CLOPIDOGREL BISULFATE 75 MG/1
75 TABLET ORAL DAILY
Qty: 90 TABLET | Refills: 3 | Status: SHIPPED | OUTPATIENT
Start: 2018-11-29 | End: 2019-04-01

## 2018-11-29 NOTE — TELEPHONE ENCOUNTER
Last Clinic Visit: 11-5-18    Note re:labs reviewed - HGB noted:  Read by Amos Walker at 11/18/2018  8:18 PM   Dear Amos,   Your anemia is improving. Your A1c is slightly elevated compared to previous but not alarming or worrisome.  I would like you to check your blood sugars in the morning before breakfast and also approximately 2 hours after dinner for a week or so and bring this data to your next appointment with me in January.  We can decide then if any changes are needed. Please let me know if you have any questions or concerns: Primary Care: [phone].   Racheal Patterson MD   Internal Medicine/CE   Entered by Racheal Swift MD at 11/16/2018  5:16 PM

## 2018-11-29 NOTE — TELEPHONE ENCOUNTER
Message from Gabriela:  Olga Field CMA Thu Nov 29, 2018 7:20 AM        ----- Message -----   From: Amos Walker   Sent: 11/28/2018 11:48 PM   To: [facility] Staff-  Subject: Medication Renewal Request     Original authorizing provider: MD Amos Jimenez would like a refill of the following medications:  clopidogrel (PLAVIX) 75 MG tablet [Racheal Swift MD]    Preferred pharmacy: [facility] [address] [place] [address] [address] [place] [address] AVE [address] AT [facility]    Comment:

## 2018-11-30 DIAGNOSIS — I73.9 PERIPHERAL VASCULAR DISEASE (H): ICD-10-CM

## 2018-12-04 ENCOUNTER — OFFICE VISIT (OUTPATIENT)
Dept: PODIATRY | Facility: CLINIC | Age: 73
End: 2018-12-04
Payer: MEDICARE

## 2018-12-04 VITALS — SYSTOLIC BLOOD PRESSURE: 138 MMHG | DIASTOLIC BLOOD PRESSURE: 70 MMHG | HEART RATE: 99 BPM | OXYGEN SATURATION: 98 %

## 2018-12-04 DIAGNOSIS — L97.512 SKIN ULCER OF RIGHT FOOT WITH FAT LAYER EXPOSED (H): ICD-10-CM

## 2018-12-04 DIAGNOSIS — E11.49 TYPE II OR UNSPECIFIED TYPE DIABETES MELLITUS WITH NEUROLOGICAL MANIFESTATIONS, NOT STATED AS UNCONTROLLED(250.60) (H): ICD-10-CM

## 2018-12-04 DIAGNOSIS — L97.912 ULCER OF RIGHT LOWER EXTREMITY WITH FAT LAYER EXPOSED (H): Primary | ICD-10-CM

## 2018-12-04 DIAGNOSIS — E11.51 DIABETES MELLITUS WITH PERIPHERAL VASCULAR DISEASE (H): ICD-10-CM

## 2018-12-04 PROCEDURE — 15271 SKIN SUB GRAFT TRNK/ARM/LEG: CPT | Performed by: PODIATRIST

## 2018-12-04 RX ORDER — CLOPIDOGREL BISULFATE 75 MG/1
TABLET ORAL
Qty: 90 TABLET | Refills: 0 | OUTPATIENT
Start: 2018-12-04

## 2018-12-04 NOTE — PATIENT INSTRUCTIONS
Thanks for coming today.  [facility]  [address] [place] [address]    To schedule future appointments in Ortho Clinic, you may call [phone].    To schedule ordered imaging by your Provider: Call [facility] at [phone]    [facility] available online at:   [email or web address]    Please call if any further questions or concerns [phone] and ask for the Orthopedic Department. Clinic hours 8 am to 5 pm.    Return to clinic if symptoms worsen.

## 2018-12-04 NOTE — NURSING NOTE
Amos Walker's chief complaint for this visit includes:  Chief Complaint   Patient presents with     RECHECK     apligraf     PCP: Racheal Swift    Referring Provider:  No referring provider defined for this encounter.    /70  Pulse 99  SpO2 98%  Data Unavailable     Do you need any medication refills at today's visit? no
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
## 2018-12-04 NOTE — MR AVS SNAPSHOT
After Visit Summary   12/4/2018    Amos Walker    MRN: [id number]           Patient Information     Date Of Birth          1945        Visit Information        Provider Department      12/4/2018 3:00 PM Brayan Mcclain DPM [facility]        Today's Diagnoses     Ulcer of right lower extremity with fat layer exposed (H)    -  1    Skin ulcer of right foot with fat layer exposed (H)        Type II or unspecified type diabetes mellitus with neurological manifestations, not stated as uncontrolled(250.60) (H)        Chronic venous hypertension with ulcer involving right side (H)        Diabetes mellitus with peripheral vascular disease (H)          Care Instructions    Thanks for coming today.  [facility] Medicine Clinic  [address] [place] [address]    To schedule future appointments in Ortho Clinic, you may call [phone].    To schedule ordered imaging by your Provider: Call [facility] at [phone]    [employer] available online at:   [email or web address]    Please call if any further questions or concerns [phone] and ask for the Orthopedic Department. Clinic hours 8 am to 5 pm.    Return to clinic if symptoms worsen.            Follow-ups after your visit        Your next 10 appointments already scheduled     Dec 11, 2018  2:30 PM CST   Return Visit with Brayan Mcclain DPM   [facility])    [address] [place] [id number] [phone]            Dec 18, 2018  2:30 PM CST   Return Visit with Brayan Mcclain DPM   [facility])    [address] [facility] [address] [phone]            Jan 02, 2019  2:30 PM CST   Return Visit with Brayan Mcclain DPM   [facility] ([facility])    [address] [facility] [address] [phone]            Jan 08, 2019  3:00 PM CST    Return Visit with Brayan Mcclain DPM   [facility] ([facility])    [address] [place] [address]   [phone]            Anam 10, 2019 10:25 AM CST   (Arrive by 10:10 AM)   Return Visit with Racheal Swift MD   [facility] ([facility])    [address]  4th Floor  [facility] [address] [phone]            Anam 15, 2019  3:00 PM CST   Return Visit with Brayan Mcclain DPM   [facility] ([facility])    [address] [place] [address] [phone]            Jan 22, 2019  3:00 PM CST   Return Visit with Brayan Mcclain DPM   [facility] ([facility])    [address] [place] [address] [phone]            Jan 29, 2019  3:00 PM CST   Return Visit with Brayan Mcclain DPM   [facility] ([facility])    [address] [place] [address] [phone]            May 09, 2019  2:40 PM CDT   (Arrive by 2:25 PM)   Return Vascular Visit with Augusto Maharaj MD   [facility] ([facility])    [address]  3rd [facility] [address]   [phone]            Jun 20, 2019 10:30 AM CDT   RETURN RETINA with Jen Aranda MD   [facility] ([facility])    [address]  [address] [address] [phone]              Who to contact     If you have questions or need follow up information about today's clinic visit or your schedule please contact [facility] directly at [phone].  Normal or non-critical lab and imaging results will be communicated to you by MyChart, letter or phone within 4 business days after the clinic has received the results. If you do not hear from us within 7 days, please contact  the clinic through [employer] or phone. If you have a critical or abnormal lab result, we will notify you by phone as soon as possible.  Submit refill requests through [employer] or call your pharmacy and they will forward the refill request to us. Please allow 3 business days for your refill to be completed.          Additional Information About Your Visit        [employer] Information     [employer] gives you secure access to your electronic health record. If you see a primary care provider, you can also send messages to your care team and make appointments. If you have questions, please call your primary care clinic.  If you do not have a primary care provider, please call [phone] and they will assist you.      [employer] is an electronic gateway that provides easy, online access to your medical records. With [employer], you can request a clinic appointment, read your test results, renew a prescription or communicate with your care team.     To access your existing account, please contact your [facility] or call [phone] for assistance.        Care EveryWhere ID     This is your Care EveryWhere ID. This could be used by other organizations to access your [facility] medical records  [id number]        Your Vitals Were     Pulse Pulse Oximetry                99 98%           Blood Pressure from Last 3 Encounters:   12/04/18 138/70   11/27/18 145/71   11/20/18 149/67    Weight from Last 3 Encounters:   11/05/18 75.3 kg (166 lb)   09/18/18 75.8 kg (167 lb)   09/15/18 76.6 kg (168 lb 12.8 oz)              We Performed the Following     HC SKIN SUB GRAFT T/A/L AREA/<100SCM /<1ST 25 SCM     TISSUE APLIGRAF SUPPLY, PER SQ CM          Today's Medication Changes          These changes are accurate as of 12/4/18  3:22 PM.  If you have any questions, ask your nurse or doctor.               These medicines have changed or have updated prescriptions.        Dose/Directions    * clopidogrel 75 MG tablet    Commonly known as:  PLAVIX   This may have changed:  when to take this   Used for:  Peripheral vascular disease, unspecified (H)        Dose:  75 mg   Take 1 tablet (75 mg) by mouth daily   Quantity:  30 tablet   Refills:  11       * clopidogrel 75 MG tablet   Commonly known as:  PLAVIX   This may have changed:  Another medication with the same name was changed. Make sure you understand how and when to take each.   Used for:  Peripheral vascular disease (H)        Dose:  75 mg   Take 1 tablet (75 mg) by mouth daily   Quantity:  90 tablet   Refills:  3       gentamicin 0.1 % external cream   Commonly known as:  GARAMYCIN   This may have changed:    - when to take this  - reasons to take this  - additional instructions   Used for:  Ulcer of right lower leg, with fat layer exposed (H), Chronic venous hypertension with ulcer involving right side (H), Type 2 diabetes, controlled, with neuropathy (H)        Apply topically daily To right leg ulcer.   Quantity:  30 g   Refills:  5       lisinopril 10 MG tablet   Commonly known as:  PRINIVIL/ZESTRIL   This may have changed:  when to take this   Used for:  Benign essential hypertension        Dose:  10 mg   Take 1 tablet (10 mg) by mouth daily   Quantity:  90 tablet   Refills:  3       * Notice:  This list has 2 medication(s) that are the same as other medications prescribed for you. Read the directions carefully, and ask your doctor or other care provider to review them with you.             Primary Care Provider Office Phone # Fax #    Racheal Swift [phone] [address] [address]        Equal Access to Services     ROSA ELENA MELTON AH: Nataliia Bedoya, waaxda luroxanna, qaybta kaalmada jose d, yolanda lopez. So [facility] [phone].    ATENCIÓN: Si habla español, tiene a rodrigues disposición servicios gratuitos de asistencia lingüística. Llame al [phone].    We comply with  applicable federal civil rights laws and [place] laws. We do not discriminate on the basis of race, color, national origin, age, disability, sex, sexual orientation, or gender identity.            Thank you!     Thank you for choosing [facility]  for your care. Our goal is always to provide you with excellent care. Hearing back from our patients is one way we can continue to improve our services. Please take a few minutes to complete the written survey that you may receive in the mail after your visit with us. Thank you!             Your Updated Medication List - Protect others around you: Learn how to safely use, store and throw away your medicines at www.disposemymeds.org.          This list is accurate as of 12/4/18  3:22 PM.  Always use your most recent med list.                   Brand Name Dispense Instructions for use Diagnosis    ammonium lactate 12 % external cream    LAC-HYDRIN    385 g    Apply topically 2 times daily as needed for dry skin    Venous stasis, Type 2 diabetes, controlled, with neuropathy (H)       ascorbic acid 500 MG Tabs     30 tablet    Take 1 tablet (500 mg) by mouth 2 times daily    Ulcer of right lower leg, with fat layer exposed (H)       ASPIRIN PO      Take 81 mg by mouth daily        blood glucose monitoring lancets     3 Box    Use to test blood sugars 2 as directed.    Type 2 diabetes, uncontrolled, with neuropathy (H)       Blood Pressure Kit     1 kit    1 Device daily    Benign essential hypertension       clindamycin 300 MG capsule    CLEOCIN    28 capsule    Take 1 capsule (300 mg) by mouth 2 times daily    Ulcer of right lower extremity with fat layer exposed (H), Venous stasis, Type II or unspecified type diabetes mellitus with neurological manifestations, not stated as uncontrolled(250.60) (H)       * clopidogrel 75 MG tablet    PLAVIX    30 tablet    Take 1 tablet (75 mg) by mouth daily    Peripheral vascular disease, unspecified (H)       *  "clopidogrel 75 MG tablet    PLAVIX    90 tablet    Take 1 tablet (75 mg) by mouth daily    Peripheral vascular disease (H)       ferrous sulfate 325 (65 Fe) MG tablet    FEROSUL    60 tablet    Take 1 tablet (325 mg) by mouth 2 times daily    Peripheral vascular disease, unspecified (H)       gentamicin 0.1 % external cream    GARAMYCIN    30 g    Apply topically daily To right leg ulcer.    Ulcer of right lower leg, with fat layer exposed (H), Chronic venous hypertension with ulcer involving right side (H), Type 2 diabetes, controlled, with neuropathy (H)       insulin glargine 100 UNIT/ML pen    LANTUS SOLOSTAR    3 mL    Inject 16 Units Subcutaneous daily (with dinner) May take up to 22 units daily    Type 2 diabetes mellitus with diabetic peripheral angiopathy without gangrene, with long-term current use of insulin (H)       insulin pen needle 31G X 8 MM miscellaneous    B-D U/F    100 each    Use 1 daily as directed    Diabetes mellitus, type II (H)       lisinopril 10 MG tablet    PRINIVIL/ZESTRIL    90 tablet    Take 1 tablet (10 mg) by mouth daily    Benign essential hypertension       * nateglinide 120 MG tablet    STARLIX    90 tablet    TAKE 1 TABLET BY MOUTH THREE TIMES DAILY BEFORE MEALS    Type 2 diabetes, controlled, with neuropathy (H)       * nateglinide 120 MG tablet    STARLIX    90 tablet    TAKE 1 TABLET BY MOUTH THREE TIMES DAILY BEFORE MEALS    Diabetes mellitus (H)       ONETOUCH ULTRA test strip   Generic drug:  blood glucose monitoring     200 strip    USE TO TEST TWICE DAILY OR AS DIRECTED    Type 2 diabetes mellitus (H)       OPTIFOAM 6\"X6\" Pads     1 each    1 Box once a week    Ulcer of right leg, with fat layer exposed (H)       order for DME     2 each    Please measure and distribute 1 pair of 20mm Hg - 30mm Hg knee high ULCER CARE open or closed toe compression stockings.  Patient has a size 13 foot and please take this into consideration.  [employer]st or equivalent    Varicose veins of " lower extremities with other complications, Venous stasis ulcer of right lower extremity (H)       order for DME     3 each    Please measure and distribute 1 pair of 20mmHg - 30mmHg knee high open or closed toe compression stockings. Jobst ultrasheer or equivalent.    Varicose veins of both lower extremities with complications       order for DME     2 each    Please measure and distribute 1 pair of 30mmHg - 40mmHg knee high open toe ulcercare compression stockings. Jobst ultrasheer or equivalent.    Varicose veins of bilateral lower extremities with other complications       oxyCODONE 5 MG tablet    ROXICODONE    12 tablet    Take 0.5-1 tablets (2.5-5 mg) by mouth every 4 hours as needed for other (pain control or improvement in physical function. Hold dose for analgesic side effects.)    Acute post-operative pain       sildenafil 50 MG tablet    VIAGRA    10 tablet    Take 1 tablet (50 mg) by mouth daily as needed for erectile dysfunction    Vasculogenic erectile dysfunction, unspecified vasculogenic erectile dysfunction type       silver sulfADIAZINE 1 % external cream    SILVADENE    85 g    Apply topically daily To affected areas on right foot and leg.    Ulcer of right lower leg, with fat layer exposed (H), Chronic venous hypertension with ulcer involving right side (H), Type 2 diabetes, controlled, with neuropathy (H)       simvastatin 10 MG tablet    ZOCOR    90 tablet    Take 1 tablet (10 mg) by mouth At Bedtime    Type 2 diabetes, controlled, with neuropathy (H)       triamcinolone 0.1 % external cream    KENALOG    60 g    Apply sparingly to left heel daily.    Dermatitis of left foot       VITAMIN C PO      Take 1,000 mg by mouth        VITAMIN D (CHOLECALCIFEROL) PO      Take 1,000 Units by mouth 2 times daily        * Notice:  This list has 4 medication(s) that are the same as other medications prescribed for you. Read the directions carefully, and ask your doctor or other care provider to review them  with you.

## 2018-12-11 ENCOUNTER — OFFICE VISIT (OUTPATIENT)
Dept: PODIATRY | Facility: CLINIC | Age: 73
End: 2018-12-11
Payer: MEDICARE

## 2018-12-11 VITALS — DIASTOLIC BLOOD PRESSURE: 71 MMHG | OXYGEN SATURATION: 98 % | SYSTOLIC BLOOD PRESSURE: 138 MMHG | HEART RATE: 95 BPM

## 2018-12-11 DIAGNOSIS — E11.49 TYPE II OR UNSPECIFIED TYPE DIABETES MELLITUS WITH NEUROLOGICAL MANIFESTATIONS, NOT STATED AS UNCONTROLLED(250.60) (H): ICD-10-CM

## 2018-12-11 DIAGNOSIS — L97.912 ULCER OF RIGHT LOWER EXTREMITY WITH FAT LAYER EXPOSED (H): Primary | ICD-10-CM

## 2018-12-11 DIAGNOSIS — L97.512 SKIN ULCER OF RIGHT FOOT WITH FAT LAYER EXPOSED (H): ICD-10-CM

## 2018-12-11 DIAGNOSIS — E11.51 DIABETES MELLITUS WITH PERIPHERAL VASCULAR DISEASE (H): ICD-10-CM

## 2018-12-11 PROCEDURE — 99213 OFFICE O/P EST LOW 20 MIN: CPT | Performed by: PODIATRIST

## 2018-12-11 ASSESSMENT — PAIN SCALES - GENERAL: PAINLEVEL: NO PAIN (0)

## 2018-12-11 NOTE — NURSING NOTE
Amos Walker's goals for this visit include:   Chief Complaint   Patient presents with     Right Leg - Ulcer     RECHECK       He requests these members of his care team be copied on today's visit information: pcp    PCP: Racheal Swift    Referring Provider:  No referring provider defined for this encounter.    /71   Pulse 95   SpO2 98%     Do you need any medication refills at today's visit? NONE      Taylor Norton CMA
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
## 2018-12-11 NOTE — PATIENT INSTRUCTIONS
Thanks for coming today.  Ortho[facility] Medicine Clinic  [address] [place] [address]    To schedule future appointments in Ortho Clinic, you may call [phone].    To schedule ordered imaging by your provider:   Call [facility] [phone]    To schedule an injection ordered by your provider:  Call [facility] Injection scheduling line: [phone]  [employer] available online at:  [email or web address]    Please call if any further questions or concerns ([phone]).  Clinic hours 8 am to 5 pm.    Return to clinic (call) if symptoms worsen or fail to improve.

## 2018-12-18 ENCOUNTER — OFFICE VISIT (OUTPATIENT)
Dept: PODIATRY | Facility: CLINIC | Age: 73
End: 2018-12-18
Payer: MEDICARE

## 2018-12-18 VITALS — HEART RATE: 96 BPM | DIASTOLIC BLOOD PRESSURE: 60 MMHG | SYSTOLIC BLOOD PRESSURE: 136 MMHG | OXYGEN SATURATION: 98 %

## 2018-12-18 DIAGNOSIS — E11.40 TYPE 2 DIABETES, CONTROLLED, WITH NEUROPATHY (H): ICD-10-CM

## 2018-12-18 DIAGNOSIS — L97.521 SKIN ULCER OF LEFT FOOT, LIMITED TO BREAKDOWN OF SKIN (H): ICD-10-CM

## 2018-12-18 DIAGNOSIS — E11.49 TYPE II OR UNSPECIFIED TYPE DIABETES MELLITUS WITH NEUROLOGICAL MANIFESTATIONS, NOT STATED AS UNCONTROLLED(250.60) (H): ICD-10-CM

## 2018-12-18 DIAGNOSIS — L97.912 ULCER OF RIGHT LOWER LEG, WITH FAT LAYER EXPOSED (H): ICD-10-CM

## 2018-12-18 DIAGNOSIS — L97.912 ULCER OF RIGHT LOWER EXTREMITY WITH FAT LAYER EXPOSED (H): Primary | ICD-10-CM

## 2018-12-18 DIAGNOSIS — L97.512 SKIN ULCER OF RIGHT FOOT WITH FAT LAYER EXPOSED (H): ICD-10-CM

## 2018-12-18 DIAGNOSIS — E11.51 DIABETES MELLITUS WITH PERIPHERAL VASCULAR DISEASE (H): ICD-10-CM

## 2018-12-18 PROCEDURE — 15271 SKIN SUB GRAFT TRNK/ARM/LEG: CPT | Performed by: PODIATRIST

## 2018-12-18 RX ORDER — CIPROFLOXACIN 500 MG/1
500 TABLET, FILM COATED ORAL 2 TIMES DAILY
Qty: 28 TABLET | Refills: 0 | Status: SHIPPED | OUTPATIENT
Start: 2018-12-18 | End: 2019-04-01

## 2018-12-18 RX ORDER — CLINDAMYCIN HCL 300 MG
300 CAPSULE ORAL 3 TIMES DAILY
Qty: 42 CAPSULE | Refills: 0 | Status: SHIPPED | OUTPATIENT
Start: 2018-12-18 | End: 2019-04-01

## 2018-12-18 RX ORDER — GENTAMICIN SULFATE 1 MG/G
CREAM TOPICAL
Qty: 30 G | Refills: 5 | Status: SHIPPED | OUTPATIENT
Start: 2018-12-18 | End: 2020-01-29
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
## 2018-12-18 NOTE — NURSING NOTE
Amos Walker's chief complaint for this visit includes:  Chief Complaint   Patient presents with     RECHECK     Apligraf     PCP: Racheal Swift    Referring Provider:  No referring provider defined for this encounter.    /60   Pulse 96   SpO2 98%   Data Unavailable     Do you need any medication refills at today's visit? no
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
## 2018-12-18 NOTE — PATIENT INSTRUCTIONS
Thanks for coming today.  Ortho[facility] Medicine Clinic  [address] [place] [address]    To schedule future appointments in Ortho Clinic, you may call [phone].    To schedule ordered imaging by your Provider: Call [facility] at [phone]    [employer] available online at:   [email or web address]    Please call if any further questions or concerns [phone] and ask for the Orthopedic Department. Clinic hours 8 am to 5 pm.    Return to clinic if symptoms worsen.

## 2018-12-28 DIAGNOSIS — E11.9 DIABETES MELLITUS (H): ICD-10-CM

## 2018-12-28 RX ORDER — NATEGLINIDE 120 MG/1
TABLET ORAL
Qty: 90 TABLET | Refills: 0 | Status: CANCELLED | OUTPATIENT
Start: 2018-12-28

## 2019-01-01 ENCOUNTER — MYC REFILL (OUTPATIENT)
Dept: INTERNAL MEDICINE | Facility: CLINIC | Age: 74
End: 2019-01-01

## 2019-01-01 DIAGNOSIS — E11.9 DIABETES MELLITUS (H): ICD-10-CM

## 2019-01-02 ENCOUNTER — OFFICE VISIT (OUTPATIENT)
Dept: PODIATRY | Facility: CLINIC | Age: 74
End: 2019-01-02
Payer: COMMERCIAL

## 2019-01-02 VITALS — DIASTOLIC BLOOD PRESSURE: 56 MMHG | OXYGEN SATURATION: 99 % | SYSTOLIC BLOOD PRESSURE: 132 MMHG | HEART RATE: 94 BPM

## 2019-01-02 DIAGNOSIS — L97.512 SKIN ULCER OF RIGHT FOOT WITH FAT LAYER EXPOSED (H): ICD-10-CM

## 2019-01-02 DIAGNOSIS — L97.912 ULCER OF RIGHT LOWER EXTREMITY WITH FAT LAYER EXPOSED (H): Primary | ICD-10-CM

## 2019-01-02 DIAGNOSIS — E11.51 DIABETES MELLITUS WITH PERIPHERAL VASCULAR DISEASE (H): ICD-10-CM

## 2019-01-02 DIAGNOSIS — L97.521 SKIN ULCER OF LEFT FOOT, LIMITED TO BREAKDOWN OF SKIN (H): ICD-10-CM

## 2019-01-02 DIAGNOSIS — E11.49 TYPE II OR UNSPECIFIED TYPE DIABETES MELLITUS WITH NEUROLOGICAL MANIFESTATIONS, NOT STATED AS UNCONTROLLED(250.60) (H): ICD-10-CM

## 2019-01-02 PROCEDURE — 99213 OFFICE O/P EST LOW 20 MIN: CPT | Performed by: PODIATRIST

## 2019-01-02 RX ORDER — NATEGLINIDE 120 MG/1
TABLET ORAL
Qty: 90 TABLET | Refills: 2 | Status: SHIPPED | OUTPATIENT
Start: 2019-01-02 | End: 2019-01-04

## 2019-01-02 RX ORDER — NATEGLINIDE 120 MG/1
120 TABLET ORAL DAILY
Qty: 90 TABLET | Refills: 0 | Status: SHIPPED | OUTPATIENT
Start: 2019-01-02 | End: 2019-01-04

## 2019-01-02 NOTE — TELEPHONE ENCOUNTER
nateglinide (STARLIX) 120 MG tablet  Last Clinic Visit: 11/5/2018 [facility]    Failed protocol for missing labs. 90 day ángel Chambers in clinic notified.

## 2019-01-02 NOTE — TELEPHONE ENCOUNTER
Amos Walker would like a refill of the following medications:        nateglinide (STARLIX) 120 MG tablet [Racheal Swift MD]    Preferred pharmacy: [facility] [address] [place] [address] [address] AT [address]
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
## 2019-01-02 NOTE — NURSING NOTE
Amos Walker's chief complaint for this visit includes:  Chief Complaint   Patient presents with     RECHECK     right leg wound check      PCP: Racheal Swift    Referring Provider:  No referring provider defined for this encounter.    /56   Pulse 94   SpO2 99%   Data Unavailable     Do you need any medication refills at today's visit? no
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
## 2019-01-02 NOTE — PATIENT INSTRUCTIONS
Thanks for coming today.  Ortho/Sports Medicine Clinic  [address] [place] [address]    To schedule future appointments in Ortho Clinic, you may call [phone].    To schedule ordered imaging by your Provider: Call [facility] at [phone]    [employer] available online at:   [email or web address]    Please call if any further questions or concerns [phone] and ask for the Orthopedic Department. Clinic hours 8 am to 5 pm.    Return to clinic if symptoms worsen.

## 2019-01-03 ENCOUNTER — TELEPHONE (OUTPATIENT)
Dept: INTERNAL MEDICINE | Facility: CLINIC | Age: 74
End: 2019-01-03

## 2019-01-03 DIAGNOSIS — E11.9 DIABETES MELLITUS (H): ICD-10-CM

## 2019-01-03 NOTE — TELEPHONE ENCOUNTER
[facility] Call Center    Phone Message    May a detailed message be left on voicemail: yes    Reason for Call: Medication Question or concern regarding medication   Prescription Clarification  Name of Medication: [id number]  nateglinide (STARLIX) 120 MG tablet TAKE 1 TABLET BY MOUTH THREE TIMES DAILY BEFORE MEALS  Prescribing Provider: surinder   Pharmacy:    [facility] [address] [place] [address]   What on the order needs clarification? Dosage question. 3 times a day or 1 time a day?          Action Taken: Message routed to:  [facility] ([facility]

## 2019-01-04 RX ORDER — NATEGLINIDE 120 MG/1
TABLET ORAL
Qty: 90 TABLET | Refills: 2 | Status: SHIPPED | OUTPATIENT
Start: 2019-01-04 | End: 2019-04-23

## 2019-01-04 NOTE — TELEPHONE ENCOUNTER
Updated pharmacy that once daily dosing was incorrect. Patient takes nateglinide 120mg TID. The pharmacist stated they did not receive the prescription from Dr. Swift. She requested it be resent.    Medication resent with correct dose.    Dafne Bajwa RN

## 2019-01-05 RX ORDER — NATEGLINIDE 120 MG/1
TABLET ORAL
Qty: 90 TABLET | Refills: 0 | OUTPATIENT
Start: 2019-01-05

## 2019-01-08 ENCOUNTER — OFFICE VISIT (OUTPATIENT)
Dept: PODIATRY | Facility: CLINIC | Age: 74
End: 2019-01-08
Payer: COMMERCIAL

## 2019-01-08 ENCOUNTER — TELEPHONE (OUTPATIENT)
Dept: PODIATRY | Facility: CLINIC | Age: 74
End: 2019-01-08

## 2019-01-08 VITALS — HEART RATE: 88 BPM | DIASTOLIC BLOOD PRESSURE: 71 MMHG | OXYGEN SATURATION: 100 % | SYSTOLIC BLOOD PRESSURE: 151 MMHG

## 2019-01-08 DIAGNOSIS — E11.51 DIABETES MELLITUS WITH PERIPHERAL VASCULAR DISEASE (H): ICD-10-CM

## 2019-01-08 DIAGNOSIS — L97.521 SKIN ULCER OF LEFT FOOT, LIMITED TO BREAKDOWN OF SKIN (H): ICD-10-CM

## 2019-01-08 DIAGNOSIS — L97.512 SKIN ULCER OF RIGHT FOOT WITH FAT LAYER EXPOSED (H): ICD-10-CM

## 2019-01-08 DIAGNOSIS — E11.49 TYPE II OR UNSPECIFIED TYPE DIABETES MELLITUS WITH NEUROLOGICAL MANIFESTATIONS, NOT STATED AS UNCONTROLLED(250.60) (H): ICD-10-CM

## 2019-01-08 DIAGNOSIS — L97.921 SKIN ULCER OF LEFT LOWER LEG, LIMITED TO BREAKDOWN OF SKIN (H): ICD-10-CM

## 2019-01-08 DIAGNOSIS — L97.912 CHRONIC ULCER OF RIGHT LOWER EXTREMITY WITH FAT LAYER EXPOSED (H): ICD-10-CM

## 2019-01-08 PROCEDURE — 99213 OFFICE O/P EST LOW 20 MIN: CPT | Performed by: PODIATRIST

## 2019-01-08 NOTE — TELEPHONE ENCOUNTER
Financial Counselor Review for Apligraf, Dermagraft or Puraply AM:    Which product(s) to be checked: Affinity and need to check if a 6th Apligraf on same wound would be covered please.     Has the patient tried Apligraf, Dermagraft or Puraply before:    Diagnosis code (include ICD-10 code):L97.512, E11.51, 250.60 (H) E11.49    Coverage and patient financial responsibility information:YES    Does patient need to be contacted by Financial Counselor:YES      Note: Do not use abbreviations and route encounter to
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
## 2019-01-08 NOTE — PATIENT INSTRUCTIONS
Thanks for coming today.  Ortho/Sports Medicine Clinic  [address] [place] [address]    To schedule future appointments in Ortho Clinic, you may call [phone].    To schedule ordered imaging by your Provider: Call [facility] at [phone]    [employer] available online at:   [email or web address]    Please call if any further questions or concerns [phone] and ask for the Orthopedic Department. Clinic hours 8 am to 5 pm.    Return to clinic if symptoms worsen.

## 2019-01-08 NOTE — NURSING NOTE
Amos Walker's chief complaint for this visit includes:  Chief Complaint   Patient presents with     RECHECK     leg check      PCP: Racheal Swift    Referring Provider:  No referring provider defined for this encounter.    /71   Pulse 88   SpO2 100%   Data Unavailable     Do you need any medication refills at today's visit? no

## 2019-01-15 ENCOUNTER — OFFICE VISIT (OUTPATIENT)
Dept: PODIATRY | Facility: CLINIC | Age: 74
End: 2019-01-15
Payer: COMMERCIAL

## 2019-01-15 VITALS — SYSTOLIC BLOOD PRESSURE: 131 MMHG | HEART RATE: 88 BPM | OXYGEN SATURATION: 96 % | DIASTOLIC BLOOD PRESSURE: 68 MMHG

## 2019-01-15 DIAGNOSIS — E11.49 TYPE II OR UNSPECIFIED TYPE DIABETES MELLITUS WITH NEUROLOGICAL MANIFESTATIONS, NOT STATED AS UNCONTROLLED(250.60) (H): ICD-10-CM

## 2019-01-15 DIAGNOSIS — L97.512 SKIN ULCER OF RIGHT FOOT WITH FAT LAYER EXPOSED (H): ICD-10-CM

## 2019-01-15 DIAGNOSIS — L97.521 SKIN ULCER OF LEFT FOOT, LIMITED TO BREAKDOWN OF SKIN (H): ICD-10-CM

## 2019-01-15 DIAGNOSIS — L97.912 CHRONIC ULCER OF RIGHT LOWER EXTREMITY WITH FAT LAYER EXPOSED (H): ICD-10-CM

## 2019-01-15 DIAGNOSIS — E11.51 DIABETES MELLITUS WITH PERIPHERAL VASCULAR DISEASE (H): ICD-10-CM

## 2019-01-15 PROCEDURE — 99213 OFFICE O/P EST LOW 20 MIN: CPT | Performed by: PODIATRIST

## 2019-01-15 RX ORDER — CLINDAMYCIN HCL 300 MG
300 CAPSULE ORAL 2 TIMES DAILY
Qty: 28 CAPSULE | Refills: 0 | Status: SHIPPED | OUTPATIENT
Start: 2019-01-15 | End: 2019-04-01

## 2019-01-15 ASSESSMENT — PAIN SCALES - GENERAL: PAINLEVEL: MILD PAIN (2)
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
## 2019-01-15 NOTE — PATIENT INSTRUCTIONS
Thanks for coming today.  Ortho/[facility] Medicine Clinic  [address] [place] [address]    To schedule future appointments in Ortho Clinic, you may call [phone].    To schedule ordered imaging by your provider:   Call [facility] [phone]    To schedule an injection ordered by your provider:  Call [facility] Injection scheduling line: [phone]  [facility] available online at:  [email or web address]    Please call if any further questions or concerns ([phone]).  Clinic hours 8 am to 5 pm.    Return to clinic (call) if symptoms worsen or fail to improve.

## 2019-01-15 NOTE — NURSING NOTE
Amos Walker's chief complaint for this visit includes:  Chief Complaint   Patient presents with     Left Leg - Pain     infection     Right Leg - WOUND CARE     ucler     PCP: Racheal Swift    Referring Provider:  No referring provider defined for this encounter.    There were no vitals taken for this visit.  Data Unavailable     Do you need any medication refills at today's visit? yes
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
## 2019-01-22 ENCOUNTER — OFFICE VISIT (OUTPATIENT)
Dept: PODIATRY | Facility: CLINIC | Age: 74
End: 2019-01-22
Payer: COMMERCIAL

## 2019-01-22 VITALS — OXYGEN SATURATION: 98 % | DIASTOLIC BLOOD PRESSURE: 59 MMHG | SYSTOLIC BLOOD PRESSURE: 140 MMHG | HEART RATE: 50 BPM

## 2019-01-22 DIAGNOSIS — L97.521 SKIN ULCER OF LEFT FOOT, LIMITED TO BREAKDOWN OF SKIN (H): ICD-10-CM

## 2019-01-22 DIAGNOSIS — L97.512 SKIN ULCER OF RIGHT FOOT WITH FAT LAYER EXPOSED (H): ICD-10-CM

## 2019-01-22 DIAGNOSIS — E11.51 DIABETES MELLITUS WITH PERIPHERAL VASCULAR DISEASE (H): ICD-10-CM

## 2019-01-22 DIAGNOSIS — L97.921 SKIN ULCER OF LEFT LOWER LEG, LIMITED TO BREAKDOWN OF SKIN (H): ICD-10-CM

## 2019-01-22 DIAGNOSIS — E11.49 TYPE II OR UNSPECIFIED TYPE DIABETES MELLITUS WITH NEUROLOGICAL MANIFESTATIONS, NOT STATED AS UNCONTROLLED(250.60) (H): ICD-10-CM

## 2019-01-22 PROCEDURE — 99213 OFFICE O/P EST LOW 20 MIN: CPT | Performed by: PODIATRIST

## 2019-01-22 NOTE — PATIENT INSTRUCTIONS
Thanks for coming today.  Ortho/Sports Medicine Clinic  [address] [place] [address]    To schedule future appointments in Ortho Clinic, you may call [phone].    To schedule ordered imaging by your Provider: Call [facility] at [phone]    [employer] available online at:   [email or web address]    Please call if any further questions or concerns [phone] and ask for the Orthopedic Department. Clinic hours 8 am to 5 pm.    Return to clinic if symptoms worsen.
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
## 2019-01-22 NOTE — NURSING NOTE
Amos Walker's chief complaint for this visit includes:  Chief Complaint   Patient presents with     RECHECK     wound check     PCP: Racheal Swift    Referring Provider:  No referring provider defined for this encounter.    /59   Pulse 50   SpO2 98%   Data Unavailable     Do you need any medication refills at today's visit? no
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
## 2019-01-29 ENCOUNTER — OFFICE VISIT (OUTPATIENT)
Dept: PODIATRY | Facility: CLINIC | Age: 74
End: 2019-01-29
Payer: COMMERCIAL

## 2019-01-29 VITALS — HEART RATE: 98 BPM | SYSTOLIC BLOOD PRESSURE: 151 MMHG | OXYGEN SATURATION: 99 % | DIASTOLIC BLOOD PRESSURE: 69 MMHG

## 2019-01-29 DIAGNOSIS — E11.49 TYPE II OR UNSPECIFIED TYPE DIABETES MELLITUS WITH NEUROLOGICAL MANIFESTATIONS, NOT STATED AS UNCONTROLLED(250.60) (H): ICD-10-CM

## 2019-01-29 DIAGNOSIS — L97.521 SKIN ULCER OF LEFT FOOT, LIMITED TO BREAKDOWN OF SKIN (H): ICD-10-CM

## 2019-01-29 DIAGNOSIS — L97.921 SKIN ULCER OF LEFT LOWER LEG, LIMITED TO BREAKDOWN OF SKIN (H): ICD-10-CM

## 2019-01-29 DIAGNOSIS — E11.51 DIABETES MELLITUS WITH PERIPHERAL VASCULAR DISEASE (H): ICD-10-CM

## 2019-01-29 DIAGNOSIS — L97.512 SKIN ULCER OF RIGHT FOOT WITH FAT LAYER EXPOSED (H): ICD-10-CM

## 2019-01-29 PROCEDURE — 99213 OFFICE O/P EST LOW 20 MIN: CPT | Performed by: PODIATRIST

## 2019-01-29 NOTE — PATIENT INSTRUCTIONS
Thanks for coming today.  Ortho/Sports Medicine Clinic  [address] [place] [address]    To schedule future appointments in Ortho Clinic, you may call [phone].    To schedule ordered imaging by your Provider: Call [facility] at [phone]    [employer] available online at:   [email or web address]    Please call if any further questions or concerns [phone] and ask for the Orthopedic Department. Clinic hours 8 am to 5 pm.    Return to clinic if symptoms worsen.
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
## 2019-01-29 NOTE — NURSING NOTE
Amos Walker's chief complaint for this visit includes:  Chief Complaint   Patient presents with     RECHECK     wound check      PCP: Racheal Swift    Referring Provider:  No referring provider defined for this encounter.    /69   Pulse 98   SpO2 99%   Data Unavailable     Do you need any medication refills at today's visit? no

## 2019-02-01 ENCOUNTER — OFFICE VISIT (OUTPATIENT)
Dept: INTERNAL MEDICINE | Facility: CLINIC | Age: 74
End: 2019-02-01
Payer: COMMERCIAL

## 2019-02-01 VITALS
DIASTOLIC BLOOD PRESSURE: 70 MMHG | BODY MASS INDEX: 21.83 KG/M2 | OXYGEN SATURATION: 100 % | WEIGHT: 170 LBS | HEART RATE: 93 BPM | SYSTOLIC BLOOD PRESSURE: 136 MMHG

## 2019-02-01 DIAGNOSIS — E78.5 HYPERLIPIDEMIA LDL GOAL <70: Primary | ICD-10-CM

## 2019-02-01 DIAGNOSIS — I25.10 CORONARY ARTERY DISEASE INVOLVING NATIVE CORONARY ARTERY OF NATIVE HEART WITHOUT ANGINA PECTORIS: ICD-10-CM

## 2019-02-01 DIAGNOSIS — I73.9 PERIPHERAL ARTERIAL DISEASE (H): ICD-10-CM

## 2019-02-01 DIAGNOSIS — R25.1 TREMOR: ICD-10-CM

## 2019-02-01 DIAGNOSIS — E11.51 TYPE 2 DIABETES MELLITUS WITH DIABETIC PERIPHERAL ANGIOPATHY WITHOUT GANGRENE, WITHOUT LONG-TERM CURRENT USE OF INSULIN (H): ICD-10-CM

## 2019-02-01 RX ORDER — ATORVASTATIN CALCIUM 20 MG/1
20 TABLET, FILM COATED ORAL DAILY
Qty: 90 TABLET | Refills: 3 | Status: SHIPPED | OUTPATIENT
Start: 2019-02-01 | End: 2019-04-29

## 2019-02-01 ASSESSMENT — PAIN SCALES - GENERAL: PAINLEVEL: MILD PAIN (2)

## 2019-02-01 NOTE — NURSING NOTE
Chief Complaint   Patient presents with     Refill Request     3 month general wellness check per pt        Razia Self EMT at 8:55 AM on 2/1/2019.

## 2019-02-01 NOTE — PATIENT INSTRUCTIONS
[facility] Medication Refill Request Information:  * Please contact your pharmacy regarding ANY request for medication refills.  ** [facility] Prescription Fax = [phone]  * Please allow 3 business days for routine medication refills.  * Please allow 5 business days for controlled substance medication refills.     [facility] Test Result notification information:  *You will be notified with in 7-10 days of your appointment day regarding the results of your test.  If you are on MyChart you will be notified as soon as the provider has reviewed the results and signed off on them.    [facility]: [phone]

## 2019-02-01 NOTE — PROGRESS NOTES
"[facility]   Racheal Swift MD  02/01/2019      Chief Complaint:   Refill Request       History of Present Illness:   Amos Walker is a diabetic 73 year old male with a history of CKD, hypertension, heart disease, hyperlipidemia, and critical lower limb ischemia who presents for evaluation of a follow up.    Ulcer of Right Lower Extremity  Previously, I reviewed with Amos his most recent right femoral endarterectomy and angioplasty/stenting of RLE. He is on Plavix. Labs were ordered including A1c, lipid panel and CBC. Today, Amos feels his leg is \"ok\". The wound on the side of his foot is entirely scabbed over. On top of his foot, the wound is continued to be treated. On his left foot, he has had a sore that recently got infected. Amos has just gotten off a course of antibiotics to combat infection. He redresses his wound twice a day. He has noticed slight swelling from the infection. He did have some pain in his left leg after shoveling the driveway on Monday. Mild difficulties with walking, mobility, or stationary movement.     Blood Sugars  Amos has been procrastinating checking his blood sugars. This morning his sugars were 79. He finds his numbers vary in the morning. After dinner he recalls levels being around 210. Amos has increased his Lantus to 20. He has not tried exercising.    Hypertension  Amos feels his blood pressure readings at home have been \"good\". His Lisinopril medication has been reduced to 10 mg. During a recent visit to Dr. Mcclain the highest he saw was 150's systolic. Generally he finds himself sitting around the 130's.    Cholesterol  Amos has been taking Simvastatin for awhile now which has continue to work and control his cholesterol. Patient does have mild concern regarding being overmedicated on a new medication.    Other concerns discussed:  -Amos's ability to read has decreased. He believes he will need surgery and is considering meeting with his " ophthalmologist on his own time. Reports right eye is worse then his left.  -Amos notes he needs to follow up with cardiology for taking Plavix so that he can have his colonoscopy.  -Amos has noticed his left hand beginning to lose stability recently. He has difficulty holding it steady. Intermittently he notices a mild change in his hand writing. No feeling of rigid ness or stiffness. Of note, his sister does have Parkinson's.     Review of Systems:   Pertinent items are noted in HPI, remainder of complete ROS is negative.      Active Medications:       ammonium lactate (LAC-HYDRIN) 12 % cream, Apply topically 2 times daily as needed for dry skin, Disp: 385 g, Rfl: 3     Ascorbic Acid (VITAMIN C PO), Take 1,000 mg by mouth, Disp: , Rfl:      ascorbic acid 500 MG TABS, Take 1 tablet (500 mg) by mouth 2 times daily, Disp: 30 tablet, Rfl:      ASPIRIN PO, Take 81 mg by mouth daily, Disp: , Rfl:      clindamycin (CLEOCIN) 300 MG capsule, Take 1 capsule (300 mg) by mouth 2 times daily, Disp: 28 capsule, Rfl: 0     clopidogrel (PLAVIX) 75 MG tablet, Take 1 tablet (75 mg) by mouth daily, Disp: 90 tablet, Rfl: 3     clopidogrel (PLAVIX) 75 MG tablet, Take 1 tablet (75 mg) by mouth daily (Patient taking differently: Take 75 mg by mouth every evening ), Disp: 30 tablet, Rfl: 11     ferrous sulfate (IRON) 325 (65 FE) MG tablet, Take 1 tablet (325 mg) by mouth 2 times daily, Disp: 60 tablet, Rfl: 11     gentamicin (GARAMYCIN) 0.1 % external cream, Apply to left foot and leg ulcers., Disp: 30 g, Rfl: 5     insulin glargine (LANTUS SOLOSTAR) 100 UNIT/ML pen, Inject 16 Units Subcutaneous daily (with dinner) May take up to 22 units daily, Disp: 3 mL, Rfl: 3     insulin pen needle (B-D U/F) 31G X 8 MM, Use 1 daily as directed, Disp: 100 each, Rfl: 1     lisinopril (PRINIVIL/ZESTRIL) 10 MG tablet, Take 1 tablet (10 mg) by mouth daily (Patient taking differently: Take 10 mg by mouth every evening ), Disp: 90 tablet, Rfl: 3      nateglinide (STARLIX) 120 MG tablet, TAKE 1 TABLET BY MOUTH THREE TIMES DAILY BEFORE MEALS, Disp: 90 tablet, Rfl: 2     oxyCODONE IR (ROXICODONE) 5 MG tablet, Take 0.5-1 tablets (2.5-5 mg) by mouth every 4 hours as needed for other (pain control or improvement in physical function. Hold dose for analgesic side effects.), Disp: 12 tablet, Rfl: 0     sildenafil (VIAGRA) 50 MG tablet, Take 1 tablet (50 mg) by mouth daily as needed for erectile dysfunction, Disp: 10 tablet, Rfl: 11     silver sulfADIAZINE (SILVADENE) 1 % cream, Apply topically daily To affected areas on right foot and leg., Disp: 85 g, Rfl: 5     simvastatin (ZOCOR) 10 MG tablet, Take 1 tablet (10 mg) by mouth At Bedtime, Disp: 90 tablet, Rfl: 3     triamcinolone (KENALOG) 0.1 % external cream, Apply sparingly to left heel daily., Disp: 60 g, Rfl: 1     VITAMIN D, CHOLECALCIFEROL, PO, Take 1,000 Units by mouth 2 times daily, Disp: , Rfl:       Allergies:   Lisinopril; Neomycin; Methylchloroisothiazolinone [methylisothiazolinone]; and Povidone iodine      Past Medical History:  Anemia  CKD stage 3, GFR 30-59 ml/min  Heart disease  Hypertension  Hyperlipidemia  MRSA cellulitis of right foot  PAD  Tobacco use  Type II diabetes mellitus  Venous ulcer  Senile nuclear sclerosis  PVD  Fracture of neck of femur  Aftercare following joint replacement  Long-term (current) use of anticoagulants  Status post left heart catheterization  Status post coronary angiogram  Critical lower limb ischemia  Non-healing ulcer     Past Surgical History:  Angiogram  Angioplasty  Arthroplasty hip, left  Cardiac surgery  Colonoscopy  Endarterectomy femoral, right  Orthopedic surgery  Vascular surgery, stent right leg, stripped vein left leg    Family History:   Colon Cancer- Father  Kidney Disease- Father, Mother  Cardiovascular- Son (MI in 40's)  Macular degeneration- Brother      Social History:   The patient was alone.  Smoking Status: Current Every Day smoker, 0.50 PPD for 50  years, cigarettes   Smokeless Tobacco: never used, heavier smoker in the past   Alcohol Use: no       Physical Exam:   /70 (BP Location: Right arm, Patient Position: Sitting)   Pulse 93   Wt 77.1 kg (170 lb)   SpO2 100%   BMI 21.83 kg/m    Constitutional: Alert, oriented, pleasant, no acute distress  Head: Normocephalic, atraumatic  Eyes: Extra-ocular movements intact, no scleral icterus  ENT: Oropharynx clear, moist mucus membranes, fair dentition  Neck: Supple, no lymphadenopathy  Cardiovascular: Regular rate and rhythm, no murmurs, rubs or gallops, peripheral pulses full/symmetric  Respiratory: Good air movement bilaterally, lungs clear, no wheezes/rales/rhonchi  Musculoskeletal: No edema, normal muscle tone, normal gait  Neurologic: Alert and oriented, cranial nerves 2-12 intact. Sterrngth of bilateral upper extremities 5/5 with exception of reduced hand  strength bilaterally, mild dysmetria bilaterally, difficulty with heel to toe walking, Romberg: mildly abnormal, no rigidity, tremors, or bradykinesia  Skin: patient deferred examination of bilateral feet today due to dressings.  Psychiatric: normal mentation, affect and mood       Assessment and Plan:  Amos is stable today with regards to his cardiovascular issues and diabetes. He reports feeling better on reduced doses of Lisinopril and Insulin. We will check his labs again in 1-2 months to ensure his A1c is not creeping up. I advised follow up with cardiology. We will increase the intensity of his statin therapy. He will plan for a colonoscopy this spring pending cardiology approval as well as possible cataract surgery.    Hyperlipidemia LDL goal <70  - atorvastatin (LIPITOR) 20 MG tablet  Dispense: 90 tablet; Refill: 3  - Comprehensive metabolic panel **(2 WKS)    Peripheral arterial disease (H)  - atorvastatin (LIPITOR) 20 MG tablet  Dispense: 90 tablet; Refill: 3  - Lipid panel reflex to direct LDL Fasting    Coronary artery disease  involving native coronary artery of native heart without angina pectoris  - atorvastatin (LIPITOR) 20 MG tablet  Dispense: 90 tablet; Refill: 3  - Lipid panel reflex to direct LDL Fasting    Type 2 diabetes mellitus with diabetic peripheral angiopathy without gangrene, without long-term current use of insulin (H)  - Hemoglobin A1c **(2 WKS)  - Lipid panel reflex to direct LDL Fasting  - Albumin Random Urine Quantitative with Creat Ratio  - TSH with free T4 reflex **(2 WKS)  - Comprehensive metabolic panel **(2 WKS)    Tremor  Not apparent on exam. No current concerns for movement disorder. Advised close monitoring.     Routine Health Maintenence:  Immunizations (zoster, pneumovax, flu, Tdap, Hep A/B):   Most Recent Immunizations   Administered Date(s) Administered     Influenza (High Dose) 3 valent vaccine 11/05/2018     Influenza (IIV3) PF 11/01/2013     Pneumo Conj 13-V (2010&after) 11/03/2014     Pneumococcal 23 valent 12/21/2015     TDAP Vaccine (Boostrix) 03/21/2016      Lipids:   Recent Labs   Lab Test 11/16/18  0915 10/25/17  1309  11/18/14  0853   CHOL 100 92   < > 110   HDL 48 41   < > 45   LDL 42 30   < > 45   TRIG 50 100   < > 100   CHOLHDLRATIO  --   --   --  2.4    < > = values in this interval not displayed.     PSA (50-75 yrs): No results found for: PSA   AAA Screening (65-75 yrs): CT 12/17, negative  Lung Ca Screening (>30 py 55-79 or >20 py 50-79 + RF): 5/18 5x3 mm nodule  Colonoscopy (50-75 yrs): 4/18, recommend repeat when off plavix  HIV/HCV if risk factors: nonreactive HepC 6/16  Safety/Lifestyle: reviewed  Tob/EtOH: declines quitting  Depression:   PHQ-2 Score:     PHQ-2 ( 1999 [employer]) 11/20/2018 9/18/2018   Q1: Little interest or pleasure in doing things 0 0   Q2: Feeling down, depressed or hopeless 0 0   PHQ-2 Score 0 0       Advanced Directive: not discussed         Follow-up: Return in about 2 months (around 4/1/2019) for Routine Visit, with labs prior.         Scribe Disclosure:   I,  Dominic Elias, am serving as a scribe to document services personally performed by Racheal Swift MD at this visit, based upon the provider's statements to me. All documentation has been reviewed by the aforementioned provider prior to being entered into the official medical record.     Portions of this medical record were completed by a scribe. UPON MY REVIEW AND AUTHENTICATION BY ELECTRONIC SIGNATURE, this confirms (a) I performed the applicable clinical services, and (b) the record is accurate.   Racheal Swift MD  Internal Medicine      25 min spent face to face, of which >50% time spent on counseling/coordinating care exclusive of any procedure time

## 2019-02-06 ENCOUNTER — OFFICE VISIT (OUTPATIENT)
Dept: PODIATRY | Facility: CLINIC | Age: 74
End: 2019-02-06
Payer: COMMERCIAL

## 2019-02-06 VITALS — HEART RATE: 109 BPM | DIASTOLIC BLOOD PRESSURE: 61 MMHG | OXYGEN SATURATION: 98 % | SYSTOLIC BLOOD PRESSURE: 142 MMHG

## 2019-02-06 DIAGNOSIS — E11.51 DIABETES MELLITUS WITH PERIPHERAL VASCULAR DISEASE (H): ICD-10-CM

## 2019-02-06 DIAGNOSIS — E11.49 TYPE II OR UNSPECIFIED TYPE DIABETES MELLITUS WITH NEUROLOGICAL MANIFESTATIONS, NOT STATED AS UNCONTROLLED(250.60) (H): ICD-10-CM

## 2019-02-06 DIAGNOSIS — L97.512 SKIN ULCER OF RIGHT FOOT WITH FAT LAYER EXPOSED (H): ICD-10-CM

## 2019-02-06 DIAGNOSIS — L97.521 SKIN ULCER OF LEFT FOOT, LIMITED TO BREAKDOWN OF SKIN (H): ICD-10-CM

## 2019-02-06 PROCEDURE — 97597 DBRDMT OPN WND 1ST 20 CM/<: CPT | Performed by: PODIATRIST

## 2019-02-06 NOTE — PATIENT INSTRUCTIONS
Thanks for coming today.  [facility]  [address] [place] [address]    To schedule future appointments in Ortho Clinic, you may call [phone].    To schedule ordered imaging by your Provider: Call [facility] at [phone]    [email or web address] available online at:   [email or web address]    Please call if any further questions or concerns [phone] and ask for the Orthopedic Department. Clinic hours 8 am to 5 pm.    Return to clinic if symptoms worsen.
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
## 2019-02-06 NOTE — NURSING NOTE
Amos Walker's chief complaint for this visit includes:  Chief Complaint   Patient presents with     RECHECK     wound check      PCP: Racheal Swift    Referring Provider:  No referring provider defined for this encounter.    /61   Pulse 109   SpO2 98%   Data Unavailable     Do you need any medication refills at today's visit? no

## 2019-02-08 ENCOUNTER — TELEPHONE (OUTPATIENT)
Dept: CARDIOLOGY | Facility: CLINIC | Age: 74
End: 2019-02-08

## 2019-02-08 NOTE — TELEPHONE ENCOUNTER
Patient request to know plavix instructions for a Colonoscopy, which is not scheduled yet , but will be when premised by Cardiology  . Patient instructed to await instructions  .Haley Fortune L.P.N.

## 2019-02-11 NOTE — TELEPHONE ENCOUNTER
Left message for patient to call clinic.      Please see 11/19/18 telephone encounter.    Kathleen Payne, RN  Care Coordinator  [facility]  [phone]

## 2019-02-11 NOTE — TELEPHONE ENCOUNTER
Patient returned call 2/11/19 at 3:00 pm and left a message on RN voicemail.    Writer will attempt to contact patient tomorrow.    Kathleen Payne RN

## 2019-02-11 NOTE — TELEPHONE ENCOUNTER
Approved for Affinity, not apligraf.  Waiting for paperwork to confirm approval and if Dr. Mcclain would like to order.  Ashley Ellison RN

## 2019-02-13 ENCOUNTER — OFFICE VISIT (OUTPATIENT)
Dept: PODIATRY | Facility: CLINIC | Age: 74
End: 2019-02-13
Payer: COMMERCIAL

## 2019-02-13 VITALS — DIASTOLIC BLOOD PRESSURE: 59 MMHG | HEART RATE: 70 BPM | OXYGEN SATURATION: 100 % | SYSTOLIC BLOOD PRESSURE: 116 MMHG

## 2019-02-13 DIAGNOSIS — E11.49 TYPE II OR UNSPECIFIED TYPE DIABETES MELLITUS WITH NEUROLOGICAL MANIFESTATIONS, NOT STATED AS UNCONTROLLED(250.60) (H): ICD-10-CM

## 2019-02-13 DIAGNOSIS — L97.912 CHRONIC ULCER OF RIGHT LOWER EXTREMITY WITH FAT LAYER EXPOSED (H): ICD-10-CM

## 2019-02-13 DIAGNOSIS — E11.51 DIABETES MELLITUS WITH PERIPHERAL VASCULAR DISEASE (H): ICD-10-CM

## 2019-02-13 DIAGNOSIS — L97.521 SKIN ULCER OF LEFT FOOT, LIMITED TO BREAKDOWN OF SKIN (H): ICD-10-CM

## 2019-02-13 DIAGNOSIS — L97.512 SKIN ULCER OF RIGHT FOOT WITH FAT LAYER EXPOSED (H): ICD-10-CM

## 2019-02-13 PROCEDURE — 99213 OFFICE O/P EST LOW 20 MIN: CPT | Performed by: PODIATRIST

## 2019-02-13 NOTE — NURSING NOTE
Amos Walker's chief complaint for this visit includes:  Chief Complaint   Patient presents with     Right Leg - Ulcer     Left Leg - Ulcer     PCP: Racheal Swift    Referring Provider:  No referring provider defined for this encounter.    /59 (BP Location: Left arm, Patient Position: Sitting, Cuff Size: Adult Large)   Pulse 70   SpO2 100%   Data Unavailable     Do you need any medication refills at today's visit? No    Alpa Bowers LPN

## 2019-02-13 NOTE — TELEPHONE ENCOUNTER
Left message for patient to call clinic.  Kathleen Payne RN      Patient is to stay on Plavix without interruption for 1 full year s/p PCI.  Her PCI was on 3/8/18.  Need to confirm if the colonoscopy is scheduled.      Dr. Chinchilla, when can this patient have his colonoscopy for polyp removal? This is an issue that has already been addressed but need exact date of when he can have the colonoscopy since he will have to hold his Plavix 5 days prior.  So he can have the colonoscopy any time after 3/13?  Or do we not need to be exact.    Kathleen Payne, SAFIA
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
## 2019-02-13 NOTE — TELEPHONE ENCOUNTER
He can have colonoscopy anytime after 3/8/2019. He can hold for 5 days prior and then undergo colonoscopy. Due to his multiple stents and PAD I would recommend him to resume Plavix after the colonoscopy and continue indefinitely.     normal rate, regular rhythm, and no murmur.

## 2019-02-13 NOTE — PATIENT INSTRUCTIONS
Thanks for coming today.  [facility]  [address] [place] [address]    To schedule future appointments in Ortho Clinic, you may call [phone].    To schedule ordered imaging by your provider:   Call [facility] [phone]    To schedule an injection ordered by your provider:  Call [facility] Injection scheduling line: [phone]  [facility] available online at:  [email or web address]    Please call if any further questions or concerns ([phone]).  Clinic hours 8 am to 5 pm.    Return to clinic (call) if symptoms worsen or fail to improve.

## 2019-02-21 ENCOUNTER — OFFICE VISIT (OUTPATIENT)
Dept: PODIATRY | Facility: CLINIC | Age: 74
End: 2019-02-21
Payer: COMMERCIAL

## 2019-02-21 VITALS — SYSTOLIC BLOOD PRESSURE: 115 MMHG | DIASTOLIC BLOOD PRESSURE: 57 MMHG | HEART RATE: 83 BPM | OXYGEN SATURATION: 100 %

## 2019-02-21 DIAGNOSIS — E11.49 TYPE II OR UNSPECIFIED TYPE DIABETES MELLITUS WITH NEUROLOGICAL MANIFESTATIONS, NOT STATED AS UNCONTROLLED(250.60) (H): ICD-10-CM

## 2019-02-21 DIAGNOSIS — E11.51 DIABETES MELLITUS WITH PERIPHERAL VASCULAR DISEASE (H): ICD-10-CM

## 2019-02-21 DIAGNOSIS — L97.512 SKIN ULCER OF RIGHT FOOT WITH FAT LAYER EXPOSED (H): ICD-10-CM

## 2019-02-21 DIAGNOSIS — L97.912 CHRONIC ULCER OF RIGHT LOWER EXTREMITY WITH FAT LAYER EXPOSED (H): ICD-10-CM

## 2019-02-21 DIAGNOSIS — L97.521 SKIN ULCER OF LEFT FOOT, LIMITED TO BREAKDOWN OF SKIN (H): ICD-10-CM

## 2019-02-21 PROCEDURE — 99213 OFFICE O/P EST LOW 20 MIN: CPT | Performed by: PODIATRIST

## 2019-02-21 RX ORDER — GENTAMICIN SULFATE 1 MG/G
CREAM TOPICAL DAILY
Qty: 30 G | Refills: 3 | Status: SHIPPED | OUTPATIENT
Start: 2019-02-21 | End: 2019-04-01
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
## 2019-02-21 NOTE — PATIENT INSTRUCTIONS
Thanks for coming today.  Ortho/Sports Medicine Clinic  [address] [place] [address]    To schedule future appointments in Ortho Clinic, you may call [phone].    To schedule ordered imaging by your provider:   Call [facility] [phone]    To schedule an injection ordered by your provider:  Call [facility] Injection scheduling line: [phone]  [email or web address] available online at:  [email or web address]    Please call if any further questions or concerns ([phone]).  Clinic hours 8 am to 5 pm.    Return to clinic (call) if symptoms worsen or fail to improve.
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
## 2019-02-21 NOTE — NURSING NOTE
Amos Walker's chief complaint for this visit includes:  Chief Complaint   Patient presents with     Right Leg - Ulcer     PCP: Racheal Swift    Referring Provider:  No referring provider defined for this encounter.    /57 (BP Location: Left arm, Patient Position: Sitting, Cuff Size: Adult Large)   Pulse 83   SpO2 100%   Data Unavailable     Do you need any medication refills at today's visit? Question about Gentamicin. Would prefer cream over ointment.    Alpa Bowers LPN

## 2019-02-27 ENCOUNTER — OFFICE VISIT (OUTPATIENT)
Dept: PODIATRY | Facility: CLINIC | Age: 74
End: 2019-02-27
Payer: COMMERCIAL

## 2019-02-27 VITALS — OXYGEN SATURATION: 99 % | SYSTOLIC BLOOD PRESSURE: 144 MMHG | DIASTOLIC BLOOD PRESSURE: 67 MMHG | HEART RATE: 92 BPM

## 2019-02-27 DIAGNOSIS — E11.49 TYPE II OR UNSPECIFIED TYPE DIABETES MELLITUS WITH NEUROLOGICAL MANIFESTATIONS, NOT STATED AS UNCONTROLLED(250.60) (H): ICD-10-CM

## 2019-02-27 DIAGNOSIS — E11.51 DIABETES MELLITUS WITH PERIPHERAL VASCULAR DISEASE (H): ICD-10-CM

## 2019-02-27 DIAGNOSIS — L97.912 CHRONIC ULCER OF RIGHT LOWER EXTREMITY WITH FAT LAYER EXPOSED (H): ICD-10-CM

## 2019-02-27 DIAGNOSIS — L97.521 SKIN ULCER OF LEFT FOOT, LIMITED TO BREAKDOWN OF SKIN (H): ICD-10-CM

## 2019-02-27 DIAGNOSIS — L97.512 SKIN ULCER OF RIGHT FOOT WITH FAT LAYER EXPOSED (H): ICD-10-CM

## 2019-02-27 PROCEDURE — 99213 OFFICE O/P EST LOW 20 MIN: CPT | Performed by: PODIATRIST

## 2019-02-27 NOTE — PATIENT INSTRUCTIONS
Thanks for coming today.  Ortho/Sports Medicine Clinic  [address] [place] [address]    To schedule future appointments in Ortho Clinic, you may call [phone].    To schedule ordered imaging by your Provider: Call [facility] at [phone]    [employer] available online at:   [email or web address]    Please call if any further questions or concerns [phone] and ask for the Orthopedic Department. Clinic hours 8 am to 5 pm.    Return to clinic if symptoms worsen.
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
## 2019-02-27 NOTE — NURSING NOTE
Amos Walker's chief complaint for this visit includes:  Chief Complaint   Patient presents with     RECHECK     Right leg check      PCP: Racheal Swift    Referring Provider:  No referring provider defined for this encounter.    /67   Pulse 92   SpO2 99%   Data Unavailable     Do you need any medication refills at today's visit? no
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
## 2019-02-28 ENCOUNTER — MYC REFILL (OUTPATIENT)
Dept: INTERNAL MEDICINE | Facility: CLINIC | Age: 74
End: 2019-02-28

## 2019-02-28 DIAGNOSIS — E11.9 DIABETES MELLITUS, TYPE II (H): ICD-10-CM

## 2019-03-04 ENCOUNTER — MEDICAL CORRESPONDENCE (OUTPATIENT)
Dept: HEALTH INFORMATION MANAGEMENT | Facility: CLINIC | Age: 74
End: 2019-03-04

## 2019-03-06 ENCOUNTER — OFFICE VISIT (OUTPATIENT)
Dept: PODIATRY | Facility: CLINIC | Age: 74
End: 2019-03-06
Payer: COMMERCIAL

## 2019-03-06 VITALS — DIASTOLIC BLOOD PRESSURE: 67 MMHG | HEART RATE: 85 BPM | OXYGEN SATURATION: 100 % | SYSTOLIC BLOOD PRESSURE: 133 MMHG

## 2019-03-06 DIAGNOSIS — L97.512 SKIN ULCER OF RIGHT FOOT WITH FAT LAYER EXPOSED (H): ICD-10-CM

## 2019-03-06 DIAGNOSIS — E11.51 DIABETES MELLITUS WITH PERIPHERAL VASCULAR DISEASE (H): ICD-10-CM

## 2019-03-06 DIAGNOSIS — E11.49 TYPE II OR UNSPECIFIED TYPE DIABETES MELLITUS WITH NEUROLOGICAL MANIFESTATIONS, NOT STATED AS UNCONTROLLED(250.60) (H): ICD-10-CM

## 2019-03-06 PROCEDURE — 99213 OFFICE O/P EST LOW 20 MIN: CPT | Performed by: PODIATRIST
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
## 2019-03-06 NOTE — PATIENT INSTRUCTIONS
Thanks for coming today.  Ortho/Sports Medicine Clinic  [address] [place] [address]    To schedule future appointments in Ortho Clinic, you may call [phone].    To schedule ordered imaging by your provider:   Call [facility] [phone]    To schedule an injection ordered by your provider:  Call [facility] Injection scheduling line: [phone]  Constant [facility] available online at:  [email or web address]    Please call if any further questions or concerns ([phone]).  Clinic hours 8 am to 5 pm.    Return to clinic (call) if symptoms worsen or fail to improve.
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
## 2019-03-06 NOTE — NURSING NOTE
Amos Walker's chief complaint for this visit includes:  Chief Complaint   Patient presents with     Right Foot - Ulcer     Left Foot - Ulcer     PCP: Racheal Swift    Referring Provider:  No referring provider defined for this encounter.    /67 (BP Location: Left arm, Patient Position: Sitting, Cuff Size: Adult Large)   Pulse 85   SpO2 100%   Data Unavailable     Do you need any medication refills at today's visit? No    Alpa Bowers LPN

## 2019-03-08 ENCOUNTER — OFFICE VISIT (OUTPATIENT)
Dept: AUDIOLOGY | Facility: CLINIC | Age: 74
End: 2019-03-08
Payer: COMMERCIAL

## 2019-03-08 DIAGNOSIS — H90.6 MIXED HEARING LOSS, BILATERAL: Primary | ICD-10-CM

## 2019-03-08 NOTE — PROGRESS NOTES
AUDIOLOGY REPORT    BACKGROUND INFORMATION: Amos Walker was seen in the Operating Room at the [facility]  on 3/8/2019 for follow-up.  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  The patient was fit with Phonak Olga CRT hearing aids at an outside clinic reportedly around 2010.  The patient reports that the right hearing aid is not working and that he is currently using an older hearing aid in that ear.    TEST RESULTS AND PROCEDURES: A hearing aid check was performed.  Both devices were cleaned. A listening check revealed some distortion in the left hearing aid with a low level hum present.  The right hearing aid was still not working following the cleaning. The cShell was taken off and a different  was placed and the hearing aid began working again.    It was discussed with the patient that the cShell is the problem.  He was quoted $350 for a repair price, but there is the potential for a slightly cheaper repair option but the provider needed to check with the  if it was possible.  It was also discussed that the  could be changed and a non-custom double dome could be used and that fix would cost $125, however he would be at a greater risk of feedback with the non-custom dome. He wanted to wait and see what all of the options were prior to deciding how to proceed.    The distortion and hum in the left hearing aid were reviewed with the patient. It was discussed that he should start considering new hearing aids.  He expressed understanding but does not want to pursue new hearing aids until he has to.    SUMMARY AND RECOMMENDATIONS: A hearing aid check was performed today and the patient reports the right hearing aid is not working.  After trouble shooting is was confirmed that it is the cShell that is the problem.  The provider will confirm pricing for different  repair options and contact the patient so he can decide how he would like to proceed, he requested that he be contacted via e-mail.  Call this clinic with questions regarding today s visit.      Eddie Jane  Audiologist  [place] License  #[id number]

## 2019-03-12 NOTE — TELEPHONE ENCOUNTER
Patient approved for Dermagraft for 8 applications.  He has a $300 deductible, a 10% co-insurance with a $1750 out of pocket (108.50 applied).  Will need to discuss with patient if ok to order and discuss with Dr. Mcclain.  Ashley Ellison RN

## 2019-03-13 ENCOUNTER — OFFICE VISIT (OUTPATIENT)
Dept: PODIATRY | Facility: CLINIC | Age: 74
End: 2019-03-13
Payer: COMMERCIAL

## 2019-03-13 VITALS — SYSTOLIC BLOOD PRESSURE: 144 MMHG | DIASTOLIC BLOOD PRESSURE: 63 MMHG | HEART RATE: 82 BPM | OXYGEN SATURATION: 99 %

## 2019-03-13 DIAGNOSIS — L97.521 SKIN ULCER OF LEFT FOOT, LIMITED TO BREAKDOWN OF SKIN (H): ICD-10-CM

## 2019-03-13 DIAGNOSIS — L97.921 SKIN ULCER OF LEFT LOWER LEG, LIMITED TO BREAKDOWN OF SKIN (H): ICD-10-CM

## 2019-03-13 DIAGNOSIS — E11.49 TYPE II OR UNSPECIFIED TYPE DIABETES MELLITUS WITH NEUROLOGICAL MANIFESTATIONS, NOT STATED AS UNCONTROLLED(250.60) (H): ICD-10-CM

## 2019-03-13 DIAGNOSIS — E11.51 DIABETES MELLITUS WITH PERIPHERAL VASCULAR DISEASE (H): ICD-10-CM

## 2019-03-13 DIAGNOSIS — L97.512 SKIN ULCER OF RIGHT FOOT WITH FAT LAYER EXPOSED (H): ICD-10-CM

## 2019-03-13 PROCEDURE — 15271 SKIN SUB GRAFT TRNK/ARM/LEG: CPT | Performed by: PODIATRIST

## 2019-03-13 NOTE — PATIENT INSTRUCTIONS
Thanks for coming today.  Ortho/Sports Medicine Clinic  [address] [place] [address]    To schedule future appointments in Ortho Clinic, you may call [phone].    To schedule ordered imaging by your Provider: Call [facility] at [phone]    [facility] available online at:   [email or web address]    Please call if any further questions or concerns [phone] and ask for the Orthopedic Department. Clinic hours 8 am to 5 pm.    Return to clinic if symptoms worsen.
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
## 2019-03-13 NOTE — NURSING NOTE
Amos Walker's chief complaint for this visit includes:  Chief Complaint   Patient presents with     RECHECK     Dermagraft     PCP: Racheal Swift    Referring Provider:  No referring provider defined for this encounter.    /63   Pulse 82   SpO2 99%   Data Unavailable     Do you need any medication refills at today's visit? no
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
## 2019-03-20 ENCOUNTER — DOCUMENTATION ONLY (OUTPATIENT)
Dept: CARE COORDINATION | Facility: CLINIC | Age: 74
End: 2019-03-20

## 2019-03-20 ENCOUNTER — OFFICE VISIT (OUTPATIENT)
Dept: PODIATRY | Facility: CLINIC | Age: 74
End: 2019-03-20
Payer: COMMERCIAL

## 2019-03-20 VITALS — HEART RATE: 102 BPM | SYSTOLIC BLOOD PRESSURE: 107 MMHG | DIASTOLIC BLOOD PRESSURE: 61 MMHG

## 2019-03-20 DIAGNOSIS — E11.51 DIABETES MELLITUS WITH PERIPHERAL VASCULAR DISEASE (H): ICD-10-CM

## 2019-03-20 DIAGNOSIS — L97.512 SKIN ULCER OF RIGHT FOOT WITH FAT LAYER EXPOSED (H): ICD-10-CM

## 2019-03-20 DIAGNOSIS — L97.912 ULCER OF RIGHT LOWER EXTREMITY WITH FAT LAYER EXPOSED (H): ICD-10-CM

## 2019-03-20 DIAGNOSIS — E11.49 TYPE II OR UNSPECIFIED TYPE DIABETES MELLITUS WITH NEUROLOGICAL MANIFESTATIONS, NOT STATED AS UNCONTROLLED(250.60) (H): ICD-10-CM

## 2019-03-20 PROCEDURE — 15271 SKIN SUB GRAFT TRNK/ARM/LEG: CPT | Performed by: PODIATRIST

## 2019-03-20 NOTE — NURSING NOTE
Amos Walker's chief complaint for this visit includes:  Chief Complaint   Patient presents with     Right Lower Leg - Ulcer, Follow Up     PCP: Racheal Swift    Referring Provider:  No referring provider defined for this encounter.    /61 (BP Location: Left arm, Patient Position: Sitting, Cuff Size: Adult Regular)   Pulse 102   Data Unavailable     Do you need any medication refills at today's visit? No    Alpa Bowers LPN
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
## 2019-03-20 NOTE — PATIENT INSTRUCTIONS
Thanks for coming today.  [facility] Medicine Clinic  [address] [place] [address]    To schedule future appointments in Ortho Clinic, you may call [phone].    To schedule ordered imaging by your provider:   Call [facility] [phone]    To schedule an injection ordered by your provider:  Call [facility] Injection scheduling line: [phone]  [facility] available online at:  [email or web address]    Please call if any further questions or concerns ([phone]).  Clinic hours 8 am to 5 pm.    Return to clinic (call) if symptoms worsen or fail to improve.

## 2019-03-27 ENCOUNTER — OFFICE VISIT (OUTPATIENT)
Dept: PODIATRY | Facility: CLINIC | Age: 74
End: 2019-03-27
Payer: COMMERCIAL

## 2019-03-27 VITALS — SYSTOLIC BLOOD PRESSURE: 118 MMHG | OXYGEN SATURATION: 97 % | DIASTOLIC BLOOD PRESSURE: 52 MMHG | HEART RATE: 93 BPM

## 2019-03-27 DIAGNOSIS — E11.49 TYPE II OR UNSPECIFIED TYPE DIABETES MELLITUS WITH NEUROLOGICAL MANIFESTATIONS, NOT STATED AS UNCONTROLLED(250.60) (H): ICD-10-CM

## 2019-03-27 DIAGNOSIS — E11.51 TYPE 2 DIABETES MELLITUS WITH DIABETIC PERIPHERAL ANGIOPATHY WITHOUT GANGRENE, WITHOUT LONG-TERM CURRENT USE OF INSULIN (H): ICD-10-CM

## 2019-03-27 DIAGNOSIS — L97.512 SKIN ULCER OF RIGHT FOOT WITH FAT LAYER EXPOSED (H): ICD-10-CM

## 2019-03-27 DIAGNOSIS — E11.51 DIABETES MELLITUS WITH PERIPHERAL VASCULAR DISEASE (H): ICD-10-CM

## 2019-03-27 DIAGNOSIS — I25.10 CORONARY ARTERY DISEASE INVOLVING NATIVE CORONARY ARTERY OF NATIVE HEART WITHOUT ANGINA PECTORIS: ICD-10-CM

## 2019-03-27 DIAGNOSIS — I73.9 PERIPHERAL ARTERIAL DISEASE (H): ICD-10-CM

## 2019-03-27 DIAGNOSIS — E78.5 HYPERLIPIDEMIA LDL GOAL <70: ICD-10-CM

## 2019-03-27 LAB — HBA1C MFR BLD: 6.7 % (ref 0–5.6)

## 2019-03-27 PROCEDURE — 36415 COLL VENOUS BLD VENIPUNCTURE: CPT | Performed by: INTERNAL MEDICINE

## 2019-03-27 PROCEDURE — 80053 COMPREHEN METABOLIC PANEL: CPT | Performed by: INTERNAL MEDICINE

## 2019-03-27 PROCEDURE — 82043 UR ALBUMIN QUANTITATIVE: CPT | Performed by: INTERNAL MEDICINE

## 2019-03-27 PROCEDURE — 80061 LIPID PANEL: CPT | Performed by: INTERNAL MEDICINE

## 2019-03-27 PROCEDURE — 15271 SKIN SUB GRAFT TRNK/ARM/LEG: CPT | Performed by: PODIATRIST

## 2019-03-27 PROCEDURE — 84443 ASSAY THYROID STIM HORMONE: CPT | Performed by: INTERNAL MEDICINE

## 2019-03-27 PROCEDURE — 83036 HEMOGLOBIN GLYCOSYLATED A1C: CPT | Performed by: INTERNAL MEDICINE

## 2019-03-27 NOTE — NURSING NOTE
Amos Walker's chief complaint for this visit includes:  Chief Complaint   Patient presents with     RECHECK     Right leg dermagraft     PCP: Racheal Swift    Referring Provider:  No referring provider defined for this encounter.    /52   Pulse 93   SpO2 97%   Data Unavailable     Do you need any medication refills at today's visit? no
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
## 2019-03-27 NOTE — PATIENT INSTRUCTIONS
Thanks for coming today.  Ortho[facility] Medicine Clinic  [address] [place] [address]    To schedule future appointments in Ortho Clinic, you may call [phone].    To schedule ordered imaging by your Provider: Call [facility] at [phone]    [facility] available online at:   [email or web address]    Please call if any further questions or concerns [phone] and ask for the Orthopedic Department. Clinic hours 8 am to 5 pm.    Return to clinic if symptoms worsen.
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
## 2019-03-28 ENCOUNTER — TELEPHONE (OUTPATIENT)
Dept: INTERNAL MEDICINE | Facility: CLINIC | Age: 74
End: 2019-03-28

## 2019-03-28 LAB
ALBUMIN SERPL-MCNC: 3.2 G/DL (ref 3.4–5)
ALP SERPL-CCNC: 119 U/L (ref 40–150)
ALT SERPL W P-5'-P-CCNC: 9 U/L (ref 0–70)
ANION GAP SERPL CALCULATED.3IONS-SCNC: 6 MMOL/L (ref 3–14)
AST SERPL W P-5'-P-CCNC: 14 U/L (ref 0–45)
BILIRUB SERPL-MCNC: 0.5 MG/DL (ref 0.2–1.3)
BUN SERPL-MCNC: 27 MG/DL (ref 7–30)
CALCIUM SERPL-MCNC: 8.6 MG/DL (ref 8.5–10.1)
CHLORIDE SERPL-SCNC: 106 MMOL/L (ref 94–109)
CHOLEST SERPL-MCNC: 95 MG/DL
CO2 SERPL-SCNC: 26 MMOL/L (ref 20–32)
CREAT SERPL-MCNC: 1.16 MG/DL (ref 0.66–1.25)
CREAT UR-MCNC: 77 MG/DL
GFR SERPL CREATININE-BSD FRML MDRD: 63 ML/MIN/{1.73_M2}
GLUCOSE SERPL-MCNC: 48 MG/DL (ref 70–99)
HDLC SERPL-MCNC: 38 MG/DL
LDLC SERPL CALC-MCNC: 49 MG/DL
MICROALBUMIN UR-MCNC: 14 MG/L
MICROALBUMIN/CREAT UR: 17.89 MG/G CR (ref 0–17)
NONHDLC SERPL-MCNC: 57 MG/DL
POTASSIUM SERPL-SCNC: 4.6 MMOL/L (ref 3.4–5.3)
PROT SERPL-MCNC: 7.7 G/DL (ref 6.8–8.8)
SODIUM SERPL-SCNC: 138 MMOL/L (ref 133–144)
TRIGL SERPL-MCNC: 40 MG/DL
TSH SERPL DL<=0.005 MIU/L-ACNC: 1.12 MU/L (ref 0.4–4)

## 2019-03-28 NOTE — TELEPHONE ENCOUNTER
[facility] Call Center    Phone Message    May a detailed message be left on voicemail: yes    Reason for Call: Other: Pt calling back for Soon-Mi - Pt said his blood sugar level is 72 - said he will also send you more information through My Chart - Thanks     Action Taken: Message routed to:  [facility] Primary care

## 2019-03-28 NOTE — TELEPHONE ENCOUNTER
Received a phone call from [facility] lab just now regarding blood glucose 48 yesterday.    I called the pt, but he was sleeping, so I spoke with wife. She did not notice any sxs of hypoglycemia yesterday as fas as she remembers. Pt is often staying in the basement and wife could not see the pt most of the time even though they are home together. According to wife, pt had dinner last night. Pt does not check BG regularly, but she remembers that his BG has been fluctuating.    Pt has an upcoming appt on 4/1, I advised to check BG at least 2 times daily (one for fasting BG in the morning, one for after meal or bed time) and write down the numbers on the log book, bring the log book to the clinic on 4/1. Pt has [facility] access, so I advised to send me [facility] message or call me TODAY when pt checks fasting BG this morning after he wakes up. Wife agreed.

## 2019-03-29 NOTE — TELEPHONE ENCOUNTER
Spoke with pt. His fasting BG was 67 this morning. He had lantus 20 units last night. He did not experience  sxs of hypoglycemia, but admitted that he was not feeling great this morning. He normally goes to bed around midnight and he has night time snack before bed time. His other BG readings were 230 & 168 after the meal yesterday. He will have lantus 18 units till he sees Dr. Swift on Monday, 4/1/19. He will keep checking BG regularly and bring the log book to the appt.

## 2019-04-01 ENCOUNTER — OFFICE VISIT (OUTPATIENT)
Dept: INTERNAL MEDICINE | Facility: CLINIC | Age: 74
End: 2019-04-01
Payer: COMMERCIAL

## 2019-04-01 ENCOUNTER — TELEPHONE (OUTPATIENT)
Dept: AUDIOLOGY | Facility: CLINIC | Age: 74
End: 2019-04-01

## 2019-04-01 ENCOUNTER — TELEPHONE (OUTPATIENT)
Dept: GASTROENTEROLOGY | Facility: CLINIC | Age: 74
End: 2019-04-01

## 2019-04-01 VITALS
OXYGEN SATURATION: 99 % | SYSTOLIC BLOOD PRESSURE: 133 MMHG | BODY MASS INDEX: 20.51 KG/M2 | HEART RATE: 100 BPM | WEIGHT: 165 LBS | DIASTOLIC BLOOD PRESSURE: 58 MMHG | HEIGHT: 75 IN

## 2019-04-01 DIAGNOSIS — Z86.0100 HISTORY OF COLONIC POLYPS: Primary | ICD-10-CM

## 2019-04-01 DIAGNOSIS — L40.9 PSORIASIS: ICD-10-CM

## 2019-04-01 DIAGNOSIS — Z98.890 STATUS POST CORONARY ANGIOGRAM: ICD-10-CM

## 2019-04-01 DIAGNOSIS — I10 ESSENTIAL HYPERTENSION: ICD-10-CM

## 2019-04-01 DIAGNOSIS — E11.40 TYPE 2 DIABETES, CONTROLLED, WITH NEUROPATHY (H): ICD-10-CM

## 2019-04-01 DIAGNOSIS — L21.9 SEBORRHEIC DERMATITIS OF SCALP: ICD-10-CM

## 2019-04-01 DIAGNOSIS — I73.9 PVD (PERIPHERAL VASCULAR DISEASE) (H): ICD-10-CM

## 2019-04-01 RX ORDER — KETOCONAZOLE 20 MG/ML
SHAMPOO TOPICAL
Qty: 120 ML | Refills: 3 | Status: SHIPPED | OUTPATIENT
Start: 2019-04-01 | End: 2023-08-17

## 2019-04-01 RX ORDER — CLOBETASOL PROPIONATE 0.5 MG/G
AEROSOL, FOAM TOPICAL
Qty: 100 G | Refills: 0 | Status: SHIPPED | OUTPATIENT
Start: 2019-04-01 | End: 2019-07-10

## 2019-04-01 ASSESSMENT — MIFFLIN-ST. JEOR: SCORE: 1581.13

## 2019-04-01 ASSESSMENT — PAIN SCALES - GENERAL: PAINLEVEL: MILD PAIN (3)

## 2019-04-01 NOTE — PATIENT INSTRUCTIONS
[facility] Medication Refill Request Information:  * Please contact your pharmacy regarding ANY request for medication refills.  ** [facility] Prescription Fax = [phone]  * Please allow 3 business days for routine medication refills.  * Please allow 5 business days for controlled substance medication refills.     [facility] Test Result notification information:  *You will be notified with in 7-10 days of your appointment day regarding the results of your test.  If you are on MyChart you will be notified as soon as the provider has reviewed the results and signed off on them.    [facility]: [phone]       Please reduce glargine/lantus to 16 units daily.  Goal fasting sugar is .    Goal 2 hour post-prandial sugar is less than 180.    Contact us if you are not meeting these goals.    Schedule colonoscopy for second half of May; ask cardiology/vascular surgery if you can stop both plavix and aspirin temporarily.

## 2019-04-01 NOTE — PROGRESS NOTES
"[facility]  Primary Care Center   Racheal Swift MD  04/01/2019      Chief Complaint:   Derm Problem and Diabetes     History of Present Illness:   Amos Walker is a 73 year old male with a history of CKD, hypertension, heart disease, hyperlipidemia, and critical lower limb ischemia who presents for evaluation of diabetes and derm problem.    The patient reports his post meal blood sugar reads are usually between 160-200 mg/dL. His blood sugar in the morning is consistently under 90 mg/dL usually in the 60-70 mg/dL range.  He had a blood sugar of 46 on labs done last week.  He was not symptomatic with that and drove himself home.  Recheck of his blood sugar after drinking coffee was in the 90s.  He is usually symptomatic when his sugar is low, reporting \"starbursts\" in his vision.  He denies any sweating or shaking sensations.  He is currently taking 18 units/day of Lantus, decreased from 20 units as instructed last week. He is also on Starlix before meals.  His current A1c is 6.7%.  He continues to work with podiatry regarding ulcers to his right foot and leg.  These are improving but quite slowly.    His cardiologist told him that he can discontinue Plavix for a couple of days for his colonoscopy. He is still taking Asprin but is unsure whether he can come off this as well for the procedure.      He has been experiencing increased dandruff on his scalp since our last visit on 2/1/2019. He has a history of psoriasis on his scalp. He states that he has recurrent episodes of psoriasis. He denies any itching sensations. He denies any itching or signs of psoriasis anywhere else on his body. The patient washes his hair with Selsun Blue.     The patient will finish his prescription of Simvastatin before switching to Lipitor  He denies any chest pains, recent illness. He has had chronic sinus drainage.    Review of Systems:   Pertinent items are noted in HPI, remainder of complete ROS is negative.      Active " "Medications:      ammonium lactate (LAC-HYDRIN) 12 % cream, Apply topically 2 times daily as needed for dry skin, Disp: 385 g, Rfl: 3     ascorbic acid 500 MG TABS, Take 1 tablet (500 mg) by mouth 2 times daily, Disp: 30 tablet, Rfl:      ASPIRIN PO, Take 81 mg by mouth daily, Disp: , Rfl:      atorvastatin (LIPITOR) 20 MG tablet, Take 1 tablet (20 mg) by mouth daily Will start after simvastatin finished, Disp: 90 tablet, Rfl: 3     clopidogrel (PLAVIX) 75 MG tablet, Take 1 tablet (75 mg) by mouth daily (Patient taking differently: Take 75 mg by mouth every evening ), Disp: 30 tablet, Rfl: 11     ferrous sulfate (IRON) 325 (65 FE) MG tablet, Take 1 tablet (325 mg) by mouth 2 times daily, Disp: 60 tablet, Rfl: 11     Gauze Pads & Dressings (OPTIFOAM) 6\"X6\" PADS, 1 Box once a week, Disp: 1 each, Rfl: 6     gentamicin (GARAMYCIN) 0.1 % external cream, Apply to left foot and leg ulcers., Disp: 30 g, Rfl: 5     insulin glargine (LANTUS SOLOSTAR) 100 UNIT/ML pen, Inject 16 Units Subcutaneous daily (with dinner) May take up to 22 units daily, Disp: 3 mL, Rfl: 3     insulin pen needle (B-D U/F) 31G X 8 MM miscellaneous, Use 1 daily as directed, Disp: 100 each, Rfl: 3     lisinopril (PRINIVIL/ZESTRIL) 10 MG tablet, Take 1 tablet (10 mg) by mouth daily (Patient taking differently: Take 10 mg by mouth every evening ), Disp: 90 tablet, Rfl: 3     nateglinide (STARLIX) 120 MG tablet, TAKE 1 TABLET BY MOUTH THREE TIMES DAILY BEFORE MEALS, Disp: 90 tablet, Rfl: 2     sildenafil (VIAGRA) 50 MG tablet, Take 1 tablet (50 mg) by mouth daily as needed for erectile dysfunction, Disp: 10 tablet, Rfl: 11     silver sulfADIAZINE (SILVADENE) 1 % cream, Apply topically daily To affected areas on right foot and leg., Disp: 85 g, Rfl: 5     triamcinolone (KENALOG) 0.1 % external cream, Apply sparingly to left heel daily., Disp: 60 g, Rfl: 1     VITAMIN D, CHOLECALCIFEROL, PO, Take 1,000 Units by mouth 2 times daily, Disp: , Rfl:     " "  Allergies:   Lisinopril; Neomycin; Methylchloroisothiazolinone [methylisothiazolinone]; and Povidone iodine      Past Medical History:  Coronary artery disease   Peripheral artery disease  Critical lower limb ischemia   Chronic kidney disease, stage 3  Anemia  Emphysema of lung (on CT)  Non-healing ulcer   MRSA infection   Colon polyp  Diabetes mellitus   Senile nuclear sclerosis     Past Surgical History:  Angioplasty, femoral endarterectomy, right 9/14/18  Angiogram 3/2018  Total hip arthoplasty, left 8/27/17  Right leg stent placement 2010  Vein stripping, left leg 2009  Foot debridement 2009  Cervical disc fusion    Family History:   Colon cancer - father  Kidney disease - father, mother  Myocardial infarction - son  Macular degeneration brother      Social History:   Presents to clinic ama  Tobacco Use: Current daily smoker, 0.5 PPD.   Alcohol Use: No alcohol use.      Physical Exam:   /58 (BP Location: Left arm, Patient Position: Sitting, Cuff Size: Adult Regular)   Pulse 100   Ht 1.892 m (6' 2.5\")   Wt 74.8 kg (165 lb)   SpO2 99%   BMI 20.90 kg/m     Constitutional: Alert, oriented, pleasant, no acute distress  Head: Normocephalic, atraumatic, diffuse white scale present over the anterior and left lateral scalp   Eyes: Extra-ocular movements intact, no scleral icterus  ENT: Oropharynx clear, moist mucus membranes, poor dentition  Neck: Supple, no lymphadenopathy  Cardiovascular: Regular rate and rhythm, no murmurs, rubs or gallops, peripheral pulses full/symmetric  Respiratory: Good air movement bilaterally, lungs clear, no wheezes/rales/rhonchi  Musculoskeletal: No edema, normal muscle tone, normal gait, right lower extremity foot ulceration fully bandaged and not examined today.   Neurologic: Alert and oriented, cranial nerves 2-12 intact.  Psychiatric: normal mentation, affect and mood    Assessment and Plan:  History of colonic polyps  Pateint had a previous colonoscopy that showed polyps " however he was unable to proceed with polypectomy because he was taking Plavix following cardiac stent placement. He reports his cardiologist has cleared him to come off Plavix temporarily for colonoscopy. We will proceed with scheduling this.  Need to verify whether okay to hold ASA.  - GASTROENTEROLOGY ADULT REF PROCEDURE ONLY    Type 2 diabetes, controlled, with neuropathy (H)  Well controlled with episodes of fasting hypoglycemia. I advised him to reduce his Lantus dose to 16 units. If he continues to have postprandial hyperglycemia we may need to bring in an additional another agent     Essential hypertension  Well controlled.     PVD (peripheral vascular disease) (H)/Status post coronary angiogram  Follow up next month.     Seborrheic dermatitis of scalp  - ketoconazole (NIZORAL) 2 % external shampoo  Dispense: 120 mL; Refill: 3    Psoriasis  - clobetasol propionate (OLUX) 0.05 % external foam  Dispense: 100 g; Refill: 0    Routine Health Maintenance  Immunizations (zoster, pneumovax, flu, Tdap, Hep A/B):   Most Recent Immunizations   Administered Date(s) Administered     Influenza (High Dose) 3 valent vaccine 11/05/2018     Influenza (IIV3) PF 11/01/2013     Pneumo Conj 13-V (2010&after) 11/03/2014     Pneumococcal 23 valent 12/21/2015     TDAP Vaccine (Boostrix) 03/21/2016     Lipids:   Recent Labs   Lab Test 03/27/19  0934 11/16/18  0915  11/18/14  0853   CHOL 95 100   < > 110   HDL 38* 48   < > 45   LDL 49 42   < > 45   TRIG 40 50   < > 100   CHOLHDLRATIO  --   --   --  2.4    < > = values in this interval not displayed.     PSA (50-75 yrs): No results found for: PSA   AAA Screening (65-75 yrs): CT 12/17, negative  Lung Ca Screening (>30 py 55-79 or >20 py 50-79 + RF): 5/18 5x3 mm nodule  Colonoscopy (50-75 yrs): 4/18, recommend repeat when off plavix  HIV/HCV if risk factors: nonreactive HepC 6/16  Safety/Lifestyle: reviewed  Tob/EtOH: declines quitting  Depression:   PHQ-2 Score:     PHQ-2 ( 1999 [employer])  11/20/2018 9/18/2018   Q1: Little interest or pleasure in doing things 0 0   Q2: Feeling down, depressed or hopeless 0 0   PHQ-2 Score 0 0      Advanced Directive: deferred     Follow-up: Return in about 4 weeks (around 4/29/2019) for Routine Visit, f/u blood glucose.      Scribe Disclosure:  We, Racheal Coreas and Cody Atkins, are serving as scribes to document services personally performed by Racheal Swift MD at this visit, based upon the provider's statements to us. All documentation has been reviewed by the aforementioned provider prior to being entered into the official medical record.     Portions of this medical record were completed by a scribe. UPON MY REVIEW AND AUTHENTICATION BY ELECTRONIC SIGNATURE, this confirms (a) I performed the applicable clinical services, and (b) the record is accurate.    Racheal Swift MD  Internal Medicine    25 min spent face to face, of which >50% time spent on counseling/coordinating care exclusive of any procedure time

## 2019-04-01 NOTE — TELEPHONE ENCOUNTER
Amos Walker was seen at the [facility] on 4/1/19 for hearing aid services.  His new right cShell earmold was attached to his hearing aid and Mr. Walker reported a good fit.  His left hearing aid and earmold were cleaned today and found to be working normally.  Mr. Walker will return to the clinic as needed.

## 2019-04-01 NOTE — NURSING NOTE
Chief Complaint   Patient presents with     Derm Problem     redness and flaking on scalp      Diabetes     Patient states that he is here to discuss lab results and discuss recent blood sugars        Razia Self EMT at 11:06 AM on 4/1/2019.

## 2019-04-03 ENCOUNTER — MYC MEDICAL ADVICE (OUTPATIENT)
Dept: INTERNAL MEDICINE | Facility: CLINIC | Age: 74
End: 2019-04-03

## 2019-04-03 ENCOUNTER — TELEPHONE (OUTPATIENT)
Dept: INTERNAL MEDICINE | Facility: CLINIC | Age: 74
End: 2019-04-03

## 2019-04-03 DIAGNOSIS — E11.9 TYPE 2 DIABETES MELLITUS (H): ICD-10-CM

## 2019-04-03 NOTE — TELEPHONE ENCOUNTER
Please call patient with medication instructions for colonoscopy:  Hold plavix for 5 days prior to procedure.  He should also hold his lantus and lisinopril for 24 hours prior to the procedure.  Okay to continue on low dose aspirin 81 mg.  Thanks,  Racheal Swift MD  Internal Medicine

## 2019-04-10 ENCOUNTER — OFFICE VISIT (OUTPATIENT)
Dept: PODIATRY | Facility: CLINIC | Age: 74
End: 2019-04-10
Payer: COMMERCIAL

## 2019-04-10 VITALS — HEART RATE: 108 BPM | DIASTOLIC BLOOD PRESSURE: 56 MMHG | SYSTOLIC BLOOD PRESSURE: 123 MMHG | OXYGEN SATURATION: 99 %

## 2019-04-10 DIAGNOSIS — E11.51 DIABETES MELLITUS WITH PERIPHERAL VASCULAR DISEASE (H): ICD-10-CM

## 2019-04-10 DIAGNOSIS — E11.49 TYPE II OR UNSPECIFIED TYPE DIABETES MELLITUS WITH NEUROLOGICAL MANIFESTATIONS, NOT STATED AS UNCONTROLLED(250.60) (H): ICD-10-CM

## 2019-04-10 DIAGNOSIS — L97.512 SKIN ULCER OF RIGHT FOOT WITH FAT LAYER EXPOSED (H): ICD-10-CM

## 2019-04-10 PROCEDURE — 15271 SKIN SUB GRAFT TRNK/ARM/LEG: CPT | Performed by: PODIATRIST

## 2019-04-10 NOTE — PATIENT INSTRUCTIONS
Thanks for coming today.  [facility]  [address] [place] [address]    To schedule future appointments in Ortho Clinic, you may call [phone].    To schedule ordered imaging by your Provider: Call [facility] at [phone]    [facility] available online at:   [email or web address]    Please call if any further questions or concerns [phone] and ask for the Orthopedic Department. Clinic hours 8 am to 5 pm.    Return to clinic if symptoms worsen.

## 2019-04-10 NOTE — NURSING NOTE
Amos Walker's chief complaint for this visit includes:  Chief Complaint   Patient presents with     RECHECK     Dermagraft     PCP: Racheal Swift    Referring Provider:  No referring provider defined for this encounter.    /56   Pulse 108   SpO2 99%   Data Unavailable     Do you need any medication refills at today's visit? no
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
## 2019-04-17 ENCOUNTER — OFFICE VISIT (OUTPATIENT)
Dept: PODIATRY | Facility: CLINIC | Age: 74
End: 2019-04-17
Payer: COMMERCIAL

## 2019-04-17 VITALS
DIASTOLIC BLOOD PRESSURE: 58 MMHG | HEART RATE: 92 BPM | SYSTOLIC BLOOD PRESSURE: 108 MMHG | OXYGEN SATURATION: 98 % | TEMPERATURE: 98.7 F

## 2019-04-17 DIAGNOSIS — L08.9 INFECTION OF RIGHT FOOT: ICD-10-CM

## 2019-04-17 DIAGNOSIS — E11.49 TYPE II OR UNSPECIFIED TYPE DIABETES MELLITUS WITH NEUROLOGICAL MANIFESTATIONS, NOT STATED AS UNCONTROLLED(250.60) (H): ICD-10-CM

## 2019-04-17 DIAGNOSIS — L97.512 SKIN ULCER OF RIGHT FOOT WITH FAT LAYER EXPOSED (H): ICD-10-CM

## 2019-04-17 DIAGNOSIS — E11.51 DIABETES MELLITUS WITH PERIPHERAL VASCULAR DISEASE (H): ICD-10-CM

## 2019-04-17 DIAGNOSIS — L97.912 ULCER OF RIGHT LOWER EXTREMITY WITH FAT LAYER EXPOSED (H): ICD-10-CM

## 2019-04-17 LAB
GRAM STN SPEC: NORMAL
GRAM STN SPEC: NORMAL
Lab: NORMAL
SPECIMEN SOURCE: NORMAL

## 2019-04-17 PROCEDURE — 99214 OFFICE O/P EST MOD 30 MIN: CPT | Performed by: PODIATRIST

## 2019-04-17 RX ORDER — CIPROFLOXACIN 500 MG/1
500 TABLET, FILM COATED ORAL 2 TIMES DAILY
Qty: 28 TABLET | Refills: 0 | Status: SHIPPED | OUTPATIENT
Start: 2019-04-17 | End: 2019-05-09

## 2019-04-17 RX ORDER — CLINDAMYCIN HCL 300 MG
300 CAPSULE ORAL 2 TIMES DAILY
Qty: 20 CAPSULE | Refills: 0 | Status: SHIPPED | OUTPATIENT
Start: 2019-04-17 | End: 2019-07-10
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
## 2019-04-17 NOTE — PATIENT INSTRUCTIONS
Thanks for coming today.  [facility]  [address] [place] [address]    To schedule future appointments in Ortho Clinic, you may call [phone].    To schedule ordered imaging by your Provider: Call [facility] at [phone]    [facility] available online at:   [email or web address]    Please call if any further questions or concerns [phone] and ask for the Orthopedic Department. Clinic hours 8 am to 5 pm.    Return to clinic if symptoms worsen.

## 2019-04-17 NOTE — NURSING NOTE
Amos Walker's chief complaint for this visit includes:  Chief Complaint   Patient presents with     RECHECK     dermagraft     PCP: Racheal Swift    Referring Provider:  No referring provider defined for this encounter.    /58   Pulse 92   Temp 98.7  F (37.1  C) (Oral)   SpO2 98%   Data Unavailable     Do you need any medication refills at today's visit? no
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
## 2019-04-19 DIAGNOSIS — E11.9 TYPE 2 DIABETES MELLITUS (H): Primary | ICD-10-CM

## 2019-04-19 LAB
BACTERIA SPEC CULT: ABNORMAL
Lab: ABNORMAL
SPECIMEN SOURCE: ABNORMAL

## 2019-04-22 NOTE — TELEPHONE ENCOUNTER
Last Clinic Visit:     4/1/2019  [facility]

## 2019-04-23 DIAGNOSIS — E11.9 DIABETES MELLITUS (H): ICD-10-CM

## 2019-04-24 ENCOUNTER — OFFICE VISIT (OUTPATIENT)
Dept: PODIATRY | Facility: CLINIC | Age: 74
End: 2019-04-24
Payer: COMMERCIAL

## 2019-04-24 VITALS — OXYGEN SATURATION: 99 % | DIASTOLIC BLOOD PRESSURE: 54 MMHG | HEART RATE: 99 BPM | SYSTOLIC BLOOD PRESSURE: 118 MMHG

## 2019-04-24 DIAGNOSIS — L97.512 SKIN ULCER OF RIGHT FOOT WITH FAT LAYER EXPOSED (H): ICD-10-CM

## 2019-04-24 DIAGNOSIS — L08.9 INFECTION OF RIGHT FOOT: Primary | ICD-10-CM

## 2019-04-24 DIAGNOSIS — E11.51 DIABETES MELLITUS WITH PERIPHERAL VASCULAR DISEASE (H): ICD-10-CM

## 2019-04-24 DIAGNOSIS — E11.49 TYPE II OR UNSPECIFIED TYPE DIABETES MELLITUS WITH NEUROLOGICAL MANIFESTATIONS, NOT STATED AS UNCONTROLLED(250.60) (H): ICD-10-CM

## 2019-04-24 LAB
BACTERIA SPEC CULT: NORMAL
Lab: NORMAL
SPECIMEN SOURCE: NORMAL

## 2019-04-24 PROCEDURE — 99213 OFFICE O/P EST LOW 20 MIN: CPT | Performed by: PODIATRIST

## 2019-04-24 NOTE — NURSING NOTE
Amos Walker's chief complaint for this visit includes:  Chief Complaint   Patient presents with     RECHECK     Right leg wound check      PCP: Racheal Swift    Referring Provider:  No referring provider defined for this encounter.    /54   Pulse 99   SpO2 99%   Data Unavailable     Do you need any medication refills at today's visit? no
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
## 2019-04-24 NOTE — PATIENT INSTRUCTIONS
Thanks for coming today.  [facility]  [address] [place] [address]    To schedule future appointments in Ortho Clinic, you may call [phone].    To schedule ordered imaging by your Provider: Call [facility] at [phone]    Viigo available online at:   [email or web address]    Please call if any further questions or concerns [phone] and ask for the Orthopedic Department. Clinic hours 8 am to 5 pm.    Return to clinic if symptoms worsen.

## 2019-04-25 RX ORDER — NATEGLINIDE 120 MG/1
TABLET ORAL
Qty: 90 TABLET | Refills: 0 | Status: SHIPPED | OUTPATIENT
Start: 2019-04-25 | End: 2019-06-04

## 2019-04-25 NOTE — TELEPHONE ENCOUNTER
nateglinide (STARLIX) 120 MG tablet   Last Written Prescription Date:  1/4/19  Last Fill Quantity: 90,   # refills: 2  Last Office Visit : 4/1/19  Future Office visit:  4/29/19    90 day pharmacy.

## 2019-04-29 ENCOUNTER — OFFICE VISIT (OUTPATIENT)
Dept: INTERNAL MEDICINE | Facility: CLINIC | Age: 74
End: 2019-04-29
Payer: COMMERCIAL

## 2019-04-29 VITALS
OXYGEN SATURATION: 100 % | SYSTOLIC BLOOD PRESSURE: 134 MMHG | HEART RATE: 96 BPM | DIASTOLIC BLOOD PRESSURE: 74 MMHG | BODY MASS INDEX: 20.42 KG/M2 | WEIGHT: 161.2 LBS

## 2019-04-29 DIAGNOSIS — L97.912 ULCER OF RIGHT LOWER LEG, WITH FAT LAYER EXPOSED (H): ICD-10-CM

## 2019-04-29 DIAGNOSIS — I25.10 CORONARY ARTERY DISEASE INVOLVING NATIVE CORONARY ARTERY OF NATIVE HEART WITHOUT ANGINA PECTORIS: ICD-10-CM

## 2019-04-29 DIAGNOSIS — Z13.89 SCREENING FOR DIABETIC PERIPHERAL NEUROPATHY: Primary | ICD-10-CM

## 2019-04-29 DIAGNOSIS — E11.40 TYPE 2 DIABETES, CONTROLLED, WITH NEUROPATHY (H): ICD-10-CM

## 2019-04-29 DIAGNOSIS — E78.5 HYPERLIPIDEMIA LDL GOAL <70: ICD-10-CM

## 2019-04-29 DIAGNOSIS — I73.9 PERIPHERAL ARTERIAL DISEASE (H): ICD-10-CM

## 2019-04-29 DIAGNOSIS — L30.9 DERMATITIS OF LEFT FOOT: ICD-10-CM

## 2019-04-29 RX ORDER — GLIPIZIDE 5 MG/1
5 TABLET ORAL
Qty: 60 TABLET | Refills: 1 | Status: SHIPPED | OUTPATIENT
Start: 2019-04-29 | End: 2019-05-09

## 2019-04-29 RX ORDER — SILVER SULFADIAZINE 10 MG/G
CREAM TOPICAL DAILY
Qty: 85 G | Refills: 5 | Status: SHIPPED | OUTPATIENT
Start: 2019-04-29 | End: 2019-06-24

## 2019-04-29 RX ORDER — LANCETS 28 GAUGE
EACH MISCELLANEOUS
Qty: 3 EACH | Refills: 3 | Status: SHIPPED | OUTPATIENT
Start: 2019-04-29 | End: 2019-09-04

## 2019-04-29 RX ORDER — TRIAMCINOLONE ACETONIDE 1 MG/G
CREAM TOPICAL
Qty: 60 G | Refills: 1 | Status: SHIPPED | OUTPATIENT
Start: 2019-04-29 | End: 2020-01-29

## 2019-04-29 RX ORDER — ATORVASTATIN CALCIUM 20 MG/1
20 TABLET, FILM COATED ORAL DAILY
Qty: 90 TABLET | Refills: 3 | Status: SHIPPED | OUTPATIENT
Start: 2019-04-29 | End: 2019-07-12 | Stop reason: SINTOL

## 2019-04-29 ASSESSMENT — PAIN SCALES - GENERAL: PAINLEVEL: MILD PAIN (2)

## 2019-04-29 NOTE — PATIENT INSTRUCTIONS
[facility] Medication Refill Request Information:  * Please contact your pharmacy regarding ANY request for medication refills.  ** [facility] Prescription Fax = [phone]  * Please allow 3 business days for routine medication refills.  * Please allow 5 business days for controlled substance medication refills.     [facility] Test Result notification information:  *You will be notified with in 7-10 days of your appointment day regarding the results of your test.  If you are on MyChart you will be notified as soon as the provider has reviewed the results and signed off on them.    [facility]: [phone]       Reduce insulin dose to 8 units when you start taking glipizide.

## 2019-04-29 NOTE — NURSING NOTE
Chief Complaint   Patient presents with     Recheck Medication     here for follow up, talk about blood sugar levels       Eladio Guzman, EMT 12:10 PM on 4/29/2019.

## 2019-04-29 NOTE — PROGRESS NOTES
"[facility]  Primary Care Center   Racheal Swift MD  2019      Chief Complaint:   Recheck Medication     History of Present Illness:   Amos Walker is a 74 year old male with a history of coronary artery disease (2V CAD  S/p MARCELL x 4 3/18), PAD s/p R femoral stenting , type II DM, hypertension, hyperlipidemia, CKD w and critical lower limb ischemia with chronic wounds who presents for evaluation follow up.    Type II DM   States that since we lowered his Lantus dose to 16u from the previous 18u, his sugars have been more elevated (see below). Starlix was unchanged last visit--still 3x a day, once before each meal. He would like to take more Lantus to get his BG levels under control. He has been a diabetic for quite some time--in the past, he was on oral medications but is unsure what they all were--was on Metformin, but this was discontinued d/t his kidney disease. He has also taken Glipizide and Januvia in the past--he does not remember a reaction to Glipizide. His elevated BG levels are usually postprandial. Dinner is usually between 5PM-8PM, generally around 630-7PM. Patient eats breakfast around 10AM, and sometimes eats lunch. His BG levels he brings in today are generally 2-3 hours after eating. He states that he is being very careful with what he eats with the reduction in medication. On days that he exercises, his BG levels are \"acceptable\" but he does not do this every day.     FB, 190  Evenin, 150  Hypoglycemia: 82, 84  Hyperglycemia: Some episodes of 300    States that he has lost weight with his elevated BG, but would like to gain weight instead as he is underweight. He also expresses concerns for infections in the setting of elevated BG levels.     He did have an infection in his foot and was on Ciprofloxacin, but was transitioned to Clindamycin instead. There is some drainage still.     Colonoscopy   He is due to have a colonoscopy 05/15. He knows what he will be doing with " "his Plavix and iron, but wonders what to do with his diabetic medications. He has some swelling to his right foot, but thinks that this is partially caused by wearing regular shows for one day, not his diabetic shoes.    Medication Question  He has not been taking the Simvastatin because his pharmacy did not of the change in his medication to Atorvastatin on file. Needs a new prescription sent to his pharmacy for this.     Review of Systems:   Pertinent items are noted in HPI, remainder of complete ROS is negative.      Active Medications:      ammonium lactate (LAC-HYDRIN) 12 % cream, Apply topically 2 times daily as needed for dry skin, Disp: 385 g, Rfl: 3     ascorbic acid 500 MG TABS, Take 1 tablet (500 mg) by mouth 2 times daily, Disp: 30 tablet, Rfl:      ASPIRIN PO, Take 81 mg by mouth daily, Disp: , Rfl:      atorvastatin (LIPITOR) 20 MG tablet, Take 1 tablet (20 mg) by mouth daily Will start after simvastatin finished, Disp: 90 tablet, Rfl: 3     blood glucose (ONETOUCH ULTRA) test strip, Use twice daily as directed, Disp: 200 strip, Rfl: 3     blood glucose monitoring (FREESTYLE) lancets, Use to test blood sugars 2 as directed., Disp: 3 Box, Rfl: 3     Blood Pressure KIT, 1 Device daily, Disp: 1 kit, Rfl: 0     clindamycin (CLEOCIN) 300 MG capsule, Take 1 capsule (300 mg) by mouth 2 times daily, Disp: 20 capsule, Rfl: 0     clobetasol propionate (OLUX) 0.05 % external foam, Use daily on scalp for 1-2 weeks, then stop; continue to use ketoconazole shampoo once weekly to prevent relapse, Disp: 100 g, Rfl: 0     clopidogrel (PLAVIX) 75 MG tablet, Take 1 tablet (75 mg) by mouth daily (Patient taking differently: Take 75 mg by mouth every evening ), Disp: 30 tablet, Rfl: 11     ferrous sulfate (IRON) 325 (65 FE) MG tablet, Take 1 tablet (325 mg) by mouth 2 times daily, Disp: 60 tablet, Rfl: 11     Gauze Pads & Dressings (OPTIFOAM) 6\"X6\" PADS, 1 Box once a week, Disp: 1 each, Rfl: 6     gentamicin (GARAMYCIN) " 0.1 % external cream, Apply to left foot and leg ulcers., Disp: 30 g, Rfl: 5     insulin glargine (LANTUS SOLOSTAR) 100 UNIT/ML pen, Inject 16 Units Subcutaneous daily (with dinner) May take up to 22 units daily, Disp: 3 mL, Rfl: 3     insulin pen needle (B-D U/F) 31G X 8 MM miscellaneous, Use 1 daily as directed, Disp: 100 each, Rfl: 3     ketoconazole (NIZORAL) 2 % external shampoo, Apply topically twice a week Leave on for 3-5 min then rinse off; after 2-4 weeks use only once weekly, Disp: 120 mL, Rfl: 3     lisinopril (PRINIVIL/ZESTRIL) 10 MG tablet, Take 1 tablet (10 mg) by mouth daily (Patient taking differently: Take 10 mg by mouth every evening ), Disp: 90 tablet, Rfl: 3     nateglinide (STARLIX) 120 MG tablet, TAKE 1 TABLET BY MOUTH THREE TIMES DAILY BEFORE MEALS, Disp: 90 tablet, Rfl: 0     ONETOUCH ULTRA test strip, TEST TWICE DAILY AS DIRECTED, Disp: 200 strip, Rfl: 3     order for DME, Please measure and distribute 1 pair of 30mmHg - 40mmHg knee high open toe ulcercare compression stockings. Jobst ultrasheer or equivalent., Disp: 2 each, Rfl: 6     order for DME, Please measure and distribute 1 pair of 20mmHg - 30mmHg knee high open or closed toe compression stockings. Jobst ultrasheer or equivalent., Disp: 3 each, Rfl: 12     order for DME, Please measure and distribute 1 pair of 20mm Hg - 30mm Hg knee high ULCER CARE open or closed toe compression stockings.  Patient has a size 13 foot and please take this into consideration.  Jobst or equivalent, Disp: 2 each, Rfl: 1     sildenafil (VIAGRA) 50 MG tablet, Take 1 tablet (50 mg) by mouth daily as needed for erectile dysfunction, Disp: 10 tablet, Rfl: 11     silver sulfADIAZINE (SILVADENE) 1 % cream, Apply topically daily To affected areas on right foot and leg., Disp: 85 g, Rfl: 5     triamcinolone (KENALOG) 0.1 % external cream, Apply sparingly to left heel daily., Disp: 60 g, Rfl: 1     VITAMIN D, CHOLECALCIFEROL, PO, Take 1,000 Units by mouth 2 times  daily, Disp: , Rfl:       Allergies:   Lisinopril; Neomycin; Methylchloroisothiazolinone [methylisothiazolinone]; and Povidone iodine      Past Medical History:  Coronary artery disease   Peripheral artery disease  Critical lower limb ischemia   Chronic kidney disease, stage 3  Anemia  Emphysema of lung (on CT)  Non-healing ulcer   MRSA infection   Colon polyp  Diabetes mellitus   Senile nuclear sclerosis    Past Surgical History:  Angioplasty, femoral endarterectomy, right 9/14/18  Angiogram 3/2018  Total hip arthoplasty, left 8/27/17  Right leg stent placement 2010  Vein stripping, left leg 2009  Foot debridement 2009  Cervical disc fusion    Family History:   Colon cancer - father  Kidney disease - father, mother  Myocardial infarction - son  Macular degeneration - brother       Social History:   The patient was alone.  Smoking Status: Current every day smoker 0.5 PPD for 50 years   Smokeless Tobacco: Never   Alcohol Use: No       Physical Exam:   /74   Pulse 96   Wt 73.1 kg (161 lb 3.2 oz)   SpO2 100%   BMI 20.42 kg/m       Constitutional: Alert, oriented, pleasant, no acute distress  Head: Normocephalic, atraumatic  Eyes: Extra-ocular movements intact, no scleral icterus  Musculoskeletal: wearing bilateral compression stockings, leg wounds bandaged.   Neurologic: Alert and oriented, cranial nerves 2-12 intact.  Psychiatric: normal mentation, affect and mood      Assessment and Plan:  Hyperlipidemia LDL goal <70  - atorvastatin (LIPITOR) 20 MG tablet  Dispense: 90 tablet; Refill: 3    Peripheral arterial disease (H)  - atorvastatin (LIPITOR) 20 MG tablet  Dispense: 90 tablet; Refill: 3    Coronary artery disease involving native coronary artery of native heart without angina pectoris  - atorvastatin (LIPITOR) 20 MG tablet  Dispense: 90 tablet; Refill: 3    Type 2 diabetes, uncontrolled, with neuropathy (H)  Sugars are generally higher postprandially and we are unable to increase insulin 2/2 fasting and  overnight hypoglycemia. Because of this, discussed the option of adding another oral medication or another injection, perhaps getting him off of insulin eventually because it is not controlling his highs and lows well. He would prefer pills over an injection. Will add Glipizide, once before breakfast and once before dinner. With this new medication, we agreed to bring his Lantus dose down to 8u. No changes to Starlix. He will continue to check BG levels twice daily. In the future, I would consider adding sitagliptan and possibly a GLP1- agonist.    Follow up on medications for day of colonoscopy.  - blood glucose monitoring (FREESTYLE) lancets  Dispense: 3 each; Refill: 3    Ulcer of right lower leg, with fat layer exposed (H)  - silver sulfADIAZINE (SILVADENE) 1 % external cream  Dispense: 85 g; Refill: 5    Chronic venous hypertension with ulcer involving right side (H)  - silver sulfADIAZINE (SILVADENE) 1 % external cream  Dispense: 85 g; Refill: 5    Type 2 diabetes, controlled, with neuropathy (H)  - glipiZIDE (GLUCOTROL) 5 MG tablet  Dispense: 60 tablet; Refill: 1  - silver sulfADIAZINE (SILVADENE) 1 % external cream  Dispense: 85 g; Refill: 5    Dermatitis of left foot  - triamcinolone (KENALOG) 0.1 % external cream  Dispense: 60 g; Refill: 1    Routine Health Maintenance  Immunizations (zoster, pneumovax, flu, Tdap, Hep A/B):        Most Recent Immunizations   Administered Date(s) Administered     Influenza (High Dose) 3 valent vaccine 11/05/2018     Influenza (IIV3) PF 11/01/2013     Pneumo Conj 13-V (2010&after) 11/03/2014     Pneumococcal 23 valent 12/21/2015     TDAP Vaccine (Boostrix) 03/21/2016      Lipids:          Recent Labs   Lab Test 03/27/19  0934 11/16/18  0915   11/18/14  0853   CHOL 95 100   < > 110   HDL 38* 48   < > 45   LDL 49 42   < > 45   TRIG 40 50   < > 100   CHOLHDLRATIO  --   --   --  2.4    < > = values in this interval not displayed.      PSA (50-75 yrs): No results found for:  PSA   AAA Screening (65-75 yrs): CT 12/17, negative  Lung Ca Screening (>30 py 55-79 or >20 py 50-79 + RF): 5/18 5x3 mm nodule  Colonoscopy (50-75 yrs): 4/18, recommend repeat when off plavix  HIV/HCV if risk factors: nonreactive HepC 6/16  Safety/Lifestyle: reviewed  Tob/EtOH: declines quitting  Depression:   PHQ-2 Score:      PHQ-2 ( 1999 Pfizer) 11/20/2018 9/18/2018   Q1: Little interest or pleasure in doing things 0 0   Q2: Feeling down, depressed or hopeless 0 0   PHQ-2 Score 0 0      Advanced Directive: deferred      Follow-up: 1 week        Scribe Disclosure:  I, Makayla Cantu, am serving as a scribe to document services personally performed by Racheal Swift MD at this visit, based upon the provider's statements to me. All documentation has been reviewed by the aforementioned provider prior to being entered into the official medical record.     Portions of this medical record were completed by a scribe. UPON MY REVIEW AND AUTHENTICATION BY ELECTRONIC SIGNATURE, this confirms (a) I performed the applicable clinical services, and (b) the record is accurate.   Racheal Swift MD  Internal Medicine

## 2019-05-01 ENCOUNTER — OFFICE VISIT (OUTPATIENT)
Dept: PODIATRY | Facility: CLINIC | Age: 74
End: 2019-05-01
Payer: COMMERCIAL

## 2019-05-01 VITALS — SYSTOLIC BLOOD PRESSURE: 154 MMHG | DIASTOLIC BLOOD PRESSURE: 72 MMHG | HEART RATE: 94 BPM | OXYGEN SATURATION: 100 %

## 2019-05-01 DIAGNOSIS — L97.512 SKIN ULCER OF RIGHT FOOT WITH FAT LAYER EXPOSED (H): ICD-10-CM

## 2019-05-01 DIAGNOSIS — E11.49 TYPE II OR UNSPECIFIED TYPE DIABETES MELLITUS WITH NEUROLOGICAL MANIFESTATIONS, NOT STATED AS UNCONTROLLED(250.60) (H): Primary | ICD-10-CM

## 2019-05-01 PROCEDURE — 15271 SKIN SUB GRAFT TRNK/ARM/LEG: CPT | Performed by: PODIATRIST

## 2019-05-01 NOTE — PATIENT INSTRUCTIONS
Thanks for coming today.  Ortho[facility] Medicine Clinic  [address] [place] [address]    To schedule future appointments in Ortho Clinic, you may call [phone].    To schedule ordered imaging by your Provider: Call [facility] at [phone]    [employer] available online at:   [email or web address]    Please call if any further questions or concerns [phone] and ask for the Orthopedic Department. Clinic hours 8 am to 5 pm.    Return to clinic if symptoms worsen.
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
## 2019-05-01 NOTE — NURSING NOTE
Amos Walker's chief complaint for this visit includes:  Chief Complaint   Patient presents with     RECHECK     Right leg ulcer check      PCP: Racheal Swift    Referring Provider:  No referring provider defined for this encounter.    /72   Pulse 94   SpO2 100%   Data Unavailable     Do you need any medication refills at today's visit? no

## 2019-05-08 ENCOUNTER — OFFICE VISIT (OUTPATIENT)
Dept: CARDIOLOGY | Facility: CLINIC | Age: 74
End: 2019-05-08
Payer: COMMERCIAL

## 2019-05-08 ENCOUNTER — OFFICE VISIT (OUTPATIENT)
Dept: PODIATRY | Facility: CLINIC | Age: 74
End: 2019-05-08
Payer: COMMERCIAL

## 2019-05-08 VITALS
HEART RATE: 101 BPM | DIASTOLIC BLOOD PRESSURE: 77 MMHG | BODY MASS INDEX: 20.39 KG/M2 | SYSTOLIC BLOOD PRESSURE: 153 MMHG | OXYGEN SATURATION: 98 % | WEIGHT: 161 LBS

## 2019-05-08 VITALS
HEART RATE: 107 BPM | OXYGEN SATURATION: 98 % | SYSTOLIC BLOOD PRESSURE: 146 MMHG | DIASTOLIC BLOOD PRESSURE: 63 MMHG | TEMPERATURE: 99 F

## 2019-05-08 DIAGNOSIS — E11.49 TYPE II OR UNSPECIFIED TYPE DIABETES MELLITUS WITH NEUROLOGICAL MANIFESTATIONS, NOT STATED AS UNCONTROLLED(250.60) (H): Primary | ICD-10-CM

## 2019-05-08 DIAGNOSIS — I25.10 CORONARY ARTERY DISEASE INVOLVING NATIVE CORONARY ARTERY OF NATIVE HEART WITHOUT ANGINA PECTORIS: ICD-10-CM

## 2019-05-08 DIAGNOSIS — I73.9 PAD (PERIPHERAL ARTERY DISEASE) (H): ICD-10-CM

## 2019-05-08 DIAGNOSIS — I10 BENIGN ESSENTIAL HYPERTENSION: Primary | ICD-10-CM

## 2019-05-08 DIAGNOSIS — L97.512 SKIN ULCER OF RIGHT FOOT WITH FAT LAYER EXPOSED (H): ICD-10-CM

## 2019-05-08 DIAGNOSIS — I65.23 BILATERAL CAROTID ARTERY STENOSIS: ICD-10-CM

## 2019-05-08 DIAGNOSIS — E78.5 HYPERLIPIDEMIA LDL GOAL <70: ICD-10-CM

## 2019-05-08 DIAGNOSIS — E11.51 DIABETES MELLITUS WITH PERIPHERAL VASCULAR DISEASE (H): ICD-10-CM

## 2019-05-08 DIAGNOSIS — L97.519 ULCER OF RIGHT FOOT, UNSPECIFIED ULCER STAGE (H): ICD-10-CM

## 2019-05-08 PROCEDURE — 99213 OFFICE O/P EST LOW 20 MIN: CPT | Performed by: PODIATRIST

## 2019-05-08 PROCEDURE — 99214 OFFICE O/P EST MOD 30 MIN: CPT | Performed by: INTERNAL MEDICINE

## 2019-05-08 RX ORDER — CLINDAMYCIN HCL 300 MG
300 CAPSULE ORAL 2 TIMES DAILY
Qty: 20 CAPSULE | Refills: 0 | Status: SHIPPED | OUTPATIENT
Start: 2019-05-08 | End: 2019-06-10

## 2019-05-08 RX ORDER — LISINOPRIL 20 MG/1
20 TABLET ORAL DAILY
Qty: 90 TABLET | Refills: 3 | Status: SHIPPED | OUTPATIENT
Start: 2019-05-08 | End: 2019-05-23

## 2019-05-08 NOTE — PATIENT INSTRUCTIONS
Thanks for coming today.  Or[facility] Clinic  [address] [place] [address]    To schedule future appointments in Ortho Clinic, you may call [phone].    To schedule ordered imaging by your Provider: Call [facility] at [phone]    [employer] available online at:   [email or web address]    Please call if any further questions or concerns [phone] and ask for the Orthopedic Department. Clinic hours 8 am to 5 pm.    Return to clinic if symptoms worsen.
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
## 2019-05-08 NOTE — NURSING NOTE
Amos Walker's chief complaint for this visit includes:  Chief Complaint   Patient presents with     RECHECK     dermagraft     PCP: Racheal Swift    Referring Provider:  No referring provider defined for this encounter.    /63   Pulse 107   Temp 99  F (37.2  C) (Oral)   SpO2 98%   Data Unavailable     Do you need any medication refills at today's visit? no

## 2019-05-08 NOTE — NURSING NOTE
"Chief Complaint   Patient presents with     RECHECK     1 year f/u post stent placement. Pt reports no cardiac symptoms.       Initial /65 (BP Location: Left arm, Patient Position: Chair, Cuff Size: Adult Regular)   Pulse 105   Wt 73 kg (161 lb)   SpO2 98%   BMI 20.39 kg/m   Estimated body mass index is 20.39 kg/m  as calculated from the following:    Height as of 4/1/19: 1.892 m (6' 2.5\").    Weight as of this encounter: 73 kg (161 lb)..  BP completed using cuff size: regular    Rere Trujillo MA    "
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
## 2019-05-08 NOTE — PATIENT INSTRUCTIONS
Thank you for coming to the [facility] @ [address] [place]; please note the following instructions:    1. Increase lisinopril to 20 mg daily.    2.  Labs in 2 weeks.    3.  Follow up in 2 years        If you have any questions regarding your visit please contact your care team:     Cardiology  Telephone Number   Kathleen WHITING, RN  Michelle RODRÍGUEZ, RN   Candy REBOLLEDO, RMA  Rere LANE, RMA  Haley MEDINA, [facility]   [phone] (option 1)   For scheduling appts:     [phone] (select option 1)       For the Device Clinic (Pacemakers and ICD's)  RN's :  Ciara Payne   During business hours: [phone]    *After business hours:  [phone] (select option 4)      Normal test result notifications will be released via [employer] or mailed within 7 business days.  All other test results, will be communicated via telephone once reviewed by your cardiologist.    If you need a medication refill please contact your pharmacy.  Please allow 3 business days for your refill to be completed.    As always, thank you for trusting us with your health care needs!
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
## 2019-05-09 ENCOUNTER — OFFICE VISIT (OUTPATIENT)
Dept: VASCULAR SURGERY | Facility: CLINIC | Age: 74
End: 2019-05-09
Payer: COMMERCIAL

## 2019-05-09 ENCOUNTER — OFFICE VISIT (OUTPATIENT)
Dept: INTERNAL MEDICINE | Facility: CLINIC | Age: 74
End: 2019-05-09
Payer: COMMERCIAL

## 2019-05-09 ENCOUNTER — TELEPHONE (OUTPATIENT)
Dept: GASTROENTEROLOGY | Facility: CLINIC | Age: 74
End: 2019-05-09

## 2019-05-09 VITALS — OXYGEN SATURATION: 99 % | HEART RATE: 72 BPM | DIASTOLIC BLOOD PRESSURE: 72 MMHG | SYSTOLIC BLOOD PRESSURE: 136 MMHG

## 2019-05-09 VITALS
HEART RATE: 97 BPM | SYSTOLIC BLOOD PRESSURE: 130 MMHG | BODY MASS INDEX: 20.56 KG/M2 | WEIGHT: 162.3 LBS | DIASTOLIC BLOOD PRESSURE: 70 MMHG | TEMPERATURE: 98 F

## 2019-05-09 DIAGNOSIS — E11.40 TYPE 2 DIABETES, CONTROLLED, WITH NEUROPATHY (H): ICD-10-CM

## 2019-05-09 DIAGNOSIS — L98.493: Primary | ICD-10-CM

## 2019-05-09 DIAGNOSIS — Z12.11 SPECIAL SCREENING FOR MALIGNANT NEOPLASMS, COLON: Primary | ICD-10-CM

## 2019-05-09 DIAGNOSIS — I10 ESSENTIAL HYPERTENSION: ICD-10-CM

## 2019-05-09 DIAGNOSIS — I70.235 ATHEROSCLEROSIS OF NATIVE ARTERIES OF RIGHT LEG WITH ULCERATION OF OTHER PART OF FOOT (H): ICD-10-CM

## 2019-05-09 DIAGNOSIS — I70.233 ATHEROSCLEROSIS OF NATIVE ARTERIES OF RIGHT LEG WITH ULCERATION OF ANKLE (H): Primary | ICD-10-CM

## 2019-05-09 RX ORDER — GLIPIZIDE 10 MG/1
10 TABLET ORAL
Qty: 60 TABLET | Refills: 1 | Status: SHIPPED | OUTPATIENT
Start: 2019-05-09 | End: 2019-07-20

## 2019-05-09 ASSESSMENT — PAIN SCALES - GENERAL
PAINLEVEL: MILD PAIN (2)
PAINLEVEL: MILD PAIN (2)

## 2019-05-09 NOTE — NURSING NOTE
Chief Complaint   Patient presents with     Diabetes     pt here for diabetic check       Annel Das CMA at 3:59 PM on 5/9/2019.

## 2019-05-09 NOTE — NURSING NOTE
Vascular Rooming Note     Amos Walker's goals for this visit include:   Chief Complaint   Patient presents with     RECHECK     Amos, is being seen today for a follow up, infection/fever, right foot wound slightly painful, as reported by patient.     Christiana Hill LPN

## 2019-05-09 NOTE — TELEPHONE ENCOUNTER
Order Questions     Question Answer Comment   Procedure Colonoscopy    Purpose of Procedure Screening History of colonic polyps [Z86.010]  - Primary    Does the patient have the following? Chronic Kidney disease  Diabetes Golytely prep ordered due to comordities   Is the patient on the following medications? Plavix  ASA w/ clear wtih Cardiology first to stop meds          Referring Racheal Swift MD     Patient currently has an infected wound with low grade fevers and was prescribed clindamyacin. Collaboratively discussed best practice plan of care. Rescheduled patient with Dr. Vu on Wednesday June 12th @8:30 am arrival time. Patient requests a call one week prior to exam to go over details of exam.

## 2019-05-09 NOTE — PATIENT INSTRUCTIONS
[facility] Medication Refill Request Information:  * Please contact your pharmacy regarding ANY request for medication refills.  ** [facility] Prescription Fax = [phone]  * Please allow 3 business days for routine medication refills.  * Please allow 5 business days for controlled substance medication refills.     [facility] Test Result notification information:  *You will be notified with in 7-10 days of your appointment day regarding the results of your test.  If you are on MyChart you will be notified as soon as the provider has reviewed the results and signed off on them.    [facility]: [phone]         Increase dose of glipizide to 10 mg (2 x 5 mg tablets ) twice daily before breakfast and dinner.  Check sugars in AM before breakfast and 2 hours after largest meal and send results to Dr. FITZGERALD next week.  Continue lantus 8 units and starlix as previous.

## 2019-05-09 NOTE — PROGRESS NOTES
[facility]  Primary Care Center   Racheal Swift MD  04/29/2019      Chief Complaint:   Recheck Medication     History of Present Illness:   Amos Walker is a 74 year old male with a history of coronary artery disease (2V CAD  S/p MARCELL x 4 3/18), PAD s/p R femoral stenting 9/18, type II DM, hypertension, hyperlipidemia, CKD w and critical lower limb ischemia with chronic wounds who presents for evaluation follow up.    Amos saw his cardiologist and vascular surgeon recently. His lisinopril was increased from 10 mg to 20 mg.  BP today was 122/70 this AM.  He denies dizziness thus far but this has been an issue in the past.      As far as DM goes, his sugars, particularly post prandial, have been quite elevated recently.  He is opposed to taking pre-prandial insulin for the time being. He would prefer to optimize oral options.  We have added glipizide 5 bid in addition to his lantus and starlix.  His AM sugars have been 110-140s.  His PM sugars have been 150-250.    He is currently taking clindamycin prescribed by podiatry for LE ulcer disease.    Review of Systems:   Pertinent items are noted in HPI, remainder of complete ROS is negative.      Active Medications:      ammonium lactate (LAC-HYDRIN) 12 % cream, Apply topically 2 times daily as needed for dry skin, Disp: 385 g, Rfl: 3     ascorbic acid 500 MG TABS, Take 1 tablet (500 mg) by mouth 2 times daily, Disp: 30 tablet, Rfl:      ASPIRIN PO, Take 81 mg by mouth daily, Disp: , Rfl:      atorvastatin (LIPITOR) 20 MG tablet, Take 1 tablet (20 mg) by mouth daily Will start after simvastatin finished, Disp: 90 tablet, Rfl: 3     blood glucose (ONETOUCH ULTRA) test strip, Use twice daily as directed, Disp: 200 strip, Rfl: 3     blood glucose monitoring (FREESTYLE) lancets, Use to test blood sugars 2 as directed., Disp: 3 Box, Rfl: 3     Blood Pressure KIT, 1 Device daily, Disp: 1 kit, Rfl: 0     clindamycin (CLEOCIN) 300 MG capsule, Take 1 capsule (300 mg) by  "mouth 2 times daily, Disp: 20 capsule, Rfl: 0     clobetasol propionate (OLUX) 0.05 % external foam, Use daily on scalp for 1-2 weeks, then stop; continue to use ketoconazole shampoo once weekly to prevent relapse, Disp: 100 g, Rfl: 0     clopidogrel (PLAVIX) 75 MG tablet, Take 1 tablet (75 mg) by mouth daily (Patient taking differently: Take 75 mg by mouth every evening ), Disp: 30 tablet, Rfl: 11     ferrous sulfate (IRON) 325 (65 FE) MG tablet, Take 1 tablet (325 mg) by mouth 2 times daily, Disp: 60 tablet, Rfl: 11     Gauze Pads & Dressings (OPTIFOAM) 6\"X6\" PADS, 1 Box once a week, Disp: 1 each, Rfl: 6     gentamicin (GARAMYCIN) 0.1 % external cream, Apply to left foot and leg ulcers., Disp: 30 g, Rfl: 5     insulin glargine (LANTUS SOLOSTAR) 100 UNIT/ML pen, Inject 16 Units Subcutaneous daily (with dinner) May take up to 22 units daily, Disp: 3 mL, Rfl: 3     insulin pen needle (B-D U/F) 31G X 8 MM miscellaneous, Use 1 daily as directed, Disp: 100 each, Rfl: 3     ketoconazole (NIZORAL) 2 % external shampoo, Apply topically twice a week Leave on for 3-5 min then rinse off; after 2-4 weeks use only once weekly, Disp: 120 mL, Rfl: 3     lisinopril (PRINIVIL/ZESTRIL) 10 MG tablet, Take 1 tablet (10 mg) by mouth daily (Patient taking differently: Take 10 mg by mouth every evening ), Disp: 90 tablet, Rfl: 3     nateglinide (STARLIX) 120 MG tablet, TAKE 1 TABLET BY MOUTH THREE TIMES DAILY BEFORE MEALS, Disp: 90 tablet, Rfl: 0     ONETOUCH ULTRA test strip, TEST TWICE DAILY AS DIRECTED, Disp: 200 strip, Rfl: 3     order for DME, Please measure and distribute 1 pair of 30mmHg - 40mmHg knee high open toe ulcercare compression stockings. Jobst ultrasheer or equivalent., Disp: 2 each, Rfl: 6     order for DME, Please measure and distribute 1 pair of 20mmHg - 30mmHg knee high open or closed toe compression stockings. Jobst ultrasheer or equivalent., Disp: 3 each, Rfl: 12     order for DME, Please measure and distribute 1 " pair of 20mm Hg - 30mm Hg knee high ULCER CARE open or closed toe compression stockings.  Patient has a size 13 foot and please take this into consideration.  Jobst or equivalent, Disp: 2 each, Rfl: 1     sildenafil (VIAGRA) 50 MG tablet, Take 1 tablet (50 mg) by mouth daily as needed for erectile dysfunction, Disp: 10 tablet, Rfl: 11     silver sulfADIAZINE (SILVADENE) 1 % cream, Apply topically daily To affected areas on right foot and leg., Disp: 85 g, Rfl: 5     triamcinolone (KENALOG) 0.1 % external cream, Apply sparingly to left heel daily., Disp: 60 g, Rfl: 1     VITAMIN D, CHOLECALCIFEROL, PO, Take 1,000 Units by mouth 2 times daily, Disp: , Rfl:       Allergies:   Lisinopril; Neomycin; Methylchloroisothiazolinone [methylisothiazolinone]; and Povidone iodine      Past Medical History:  Coronary artery disease   Peripheral artery disease  Critical lower limb ischemia   Chronic kidney disease, stage 3  Anemia  Emphysema of lung (on CT)  Non-healing ulcer   MRSA infection   Colon polyp  Diabetes mellitus   Senile nuclear sclerosis    Past Surgical History:  Angioplasty, femoral endarterectomy, right 9/14/18  Angiogram 3/2018  Total hip arthoplasty, left 8/27/17  Right leg stent placement 2010  Vein stripping, left leg 2009  Foot debridement 2009  Cervical disc fusion    Family History:   Colon cancer - father  Kidney disease - father, mother  Myocardial infarction - son  Macular degeneration - brother       Social History:   The patient was alone.  Smoking Status: Current every day smoker 0.5 PPD for 50 years   Smokeless Tobacco: Never   Alcohol Use: No       Physical Exam:   /74   Pulse 96   Wt 73.1 kg (161 lb 3.2 oz)   SpO2 100%   BMI 20.42 kg/m       Constitutional: Alert, oriented, pleasant, no acute distress  Head: Normocephalic, atraumatic  Eyes: Extra-ocular movements intact, no scleral icterus  Musculoskeletal: wearing bilateral compression stockings, leg wounds bandaged.   Neurologic: Alert  and oriented, cranial nerves 2-12 intact.  Psychiatric: normal mentation, affect and mood   Detailed exam not done.     Assessment and Plan:  Hyperlipidemia LDL goal <70  Given extent of vascular disease, he would benefit from being on a high intensity statin. Reports racing heart x 1 with lipitor but doubt this is drug reaction. Encouraged him to continue atorvastatin. If tolerated, could consider escalating intensity.    Type 2 diabetes, uncontrolled, with neuropathy (H)  Sugars are generally higher postprandially and we are unable to increase insulin 2/2 fasting and overnight hypoglycemia. Because of this, discussed the option of adding another oral medication or another injection..  Have started glipizide 5 bid and postprandial hyperglycemia persists. Will try increasing dose to 10 bid.  No changes to Starlix. He will continue to check BG levels twice daily. In the future, I would consider adding sitagliptan and possibly a GLP1- agonist.  If oral medication are ineffective, would consider Novolin-R bid. He will update me with his sugars next week.    Ulcer of right lower leg, with fat layer exposed (H)  Continue management per Podiatry    Hypertension  Continue increased dose of lisinopril    Routine Health Maintenance  Immunizations (zoster, pneumovax, flu, Tdap, Hep A/B):        Most Recent Immunizations   Administered Date(s) Administered     Influenza (High Dose) 3 valent vaccine 11/05/2018     Influenza (IIV3) PF 11/01/2013     Pneumo Conj 13-V (2010&after) 11/03/2014     Pneumococcal 23 valent 12/21/2015     TDAP Vaccine (Boostrix) 03/21/2016      Lipids:          Recent Labs   Lab Test 03/27/19  0934 11/16/18  0915   11/18/14  0853   CHOL 95 100   < > 110   HDL 38* 48   < > 45   LDL 49 42   < > 45   TRIG 40 50   < > 100   CHOLHDLRATIO  --   --   --  2.4    < > = values in this interval not displayed.      PSA (50-75 yrs): No results found for: PSA   AAA Screening (65-75 yrs): CT 12/17, negative  Lung Ca  Screening (>30 py 55-79 or >20 py 50-79 + RF): 5/18 5x3 mm nodule  Colonoscopy (50-75 yrs): 4/18, recommend repeat when off plavix, pending for June and he will hold plavix x 5 days  HIV/HCV if risk factors: nonreactive HepC 6/16  Safety/Lifestyle: reviewed  Tob/EtOH: declines quitting  Depression:   PHQ-2 Score:      PHQ-2 ( 1999 Pfizer) 11/20/2018 9/18/2018   Q1: Little interest or pleasure in doing things 0 0   Q2: Feeling down, depressed or hopeless 0 0   PHQ-2 Score 0 0      Advanced Directive: deferred      Follow-up: 3-4 weeks     Racheal Swift MD  Internal Medicine

## 2019-05-09 NOTE — PROGRESS NOTES
CC: slowly healing ulcers right leg and foot    HPI: This 75 YO male returns for evaluation of slowly healing right leg and foot ulcers. He is s/p right femoral endarterectomy, SFA stent and PTA of the popliteal artery on 9/14/2018. He has had slow but steady healing of the deep ulcers using Dermagraft but was recently started on Clindamycin for suspected infection associated with an ulceration on the dorsal foot. There is no history of fevers or chills but there has been drainage from the dorsal foot lesion.    PE  /72 (BP Location: Left arm, Patient Position: Chair, Cuff Size: Adult Regular)   Pulse 72   SpO2 99%   Gen: WD WN anxious male in NAD  Right leg: anterior ankle ulceration with excellent granulation base, smaller in size. Not infected.   Foot: Right lateral ulceration has filled in. No evidence of infection. Small dorsal ulceration with clear fluid drainage.    Right foot warm and well perfused. Pulses not palpable. Capillary refill brisk.    Assess: Improved appearance of ulcers under excellent care by Dr Mcclain. Followed weekly in his clinic. No indications for further vascular intervention at this time    Plan: Reassurance. Continue wound management per Dr. Mcclain. RTC in 6 months with CAROL and TP on arrival.    I spent a total of 10 minutes face-to-face time with this patient, more than 50% of which was spent in counseling    Augusto Maharaj MD

## 2019-05-09 NOTE — LETTER
5/9/2019       RE: Amos Walker  [address] [place] [address]     Dear Colleague,    Thank you for referring your patient, Amos Walker, to the [facility] at [facility]. Please see a copy of my visit note below.    CC: slowly healing ulcers right leg and foot    HPI: This 73 YO male returns for evaluation of slowly healing right leg and foot ulcers. He is s/p right femoral endarterectomy, SFA stent and PTA of the popliteal artery on 9/14/2018. He has had slow but steady healing of the deep ulcers using Dermagraft but was recently started on Clindamycin for suspected infection associated with an ulceration on the dorsal foot. There is no history of fevers or chills but there has been drainage from the dorsal foot lesion.    PE  /72 (BP Location: Left arm, Patient Position: Chair, Cuff Size: Adult Regular)   Pulse 72   SpO2 99%   Gen: WD WN anxious male in NAD  Right leg: anterior ankle ulceration with excellent granulation base, smaller in size. Not infected.   Foot: Right lateral ulceration has filled in. No evidence of infection. Small dorsal ulceration with clear fluid drainage.    Right foot warm and well perfused. Pulses not palpable. Capillary refill brisk.    Assess: Improved appearance of ulcers under excellent care by Dr Mcclain. Followed weekly in his clinic. No indications for further vascular intervention at this time    Plan: Reassurance. Continue wound management per Dr. Mcclain. RTC in 6 months with CAROL and TP on arrival.    I spent a total of 10 minutes face-to-face time with this patient, more than 50% of which was spent in counseling    Augusto Maharaj MD

## 2019-05-16 ENCOUNTER — OFFICE VISIT (OUTPATIENT)
Dept: PODIATRY | Facility: CLINIC | Age: 74
End: 2019-05-16
Payer: COMMERCIAL

## 2019-05-16 VITALS
HEART RATE: 93 BPM | OXYGEN SATURATION: 97 % | DIASTOLIC BLOOD PRESSURE: 62 MMHG | SYSTOLIC BLOOD PRESSURE: 106 MMHG | TEMPERATURE: 97.4 F

## 2019-05-16 DIAGNOSIS — L97.512 SKIN ULCER OF RIGHT FOOT WITH FAT LAYER EXPOSED (H): ICD-10-CM

## 2019-05-16 DIAGNOSIS — E11.49 TYPE II OR UNSPECIFIED TYPE DIABETES MELLITUS WITH NEUROLOGICAL MANIFESTATIONS, NOT STATED AS UNCONTROLLED(250.60) (H): Primary | ICD-10-CM

## 2019-05-16 DIAGNOSIS — E11.51 DIABETES MELLITUS WITH PERIPHERAL VASCULAR DISEASE (H): ICD-10-CM

## 2019-05-16 DIAGNOSIS — L97.912 ULCER OF RIGHT LOWER EXTREMITY WITH FAT LAYER EXPOSED (H): ICD-10-CM

## 2019-05-16 PROCEDURE — 15271 SKIN SUB GRAFT TRNK/ARM/LEG: CPT | Performed by: PODIATRIST

## 2019-05-16 NOTE — PATIENT INSTRUCTIONS
Thanks for coming today.  Ortho/Sports Medicine Clinic  [address] [place] [address]    To schedule future appointments in Ortho Clinic, you may call [phone].    To schedule ordered imaging by your Provider: Call [facility] at [phone]    "[email or web address]" available online at:   [email or web address]    Please call if any further questions or concerns [phone] and ask for the Orthopedic Department. Clinic hours 8 am to 5 pm.    Return to clinic if symptoms worsen.
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
## 2019-05-16 NOTE — NURSING NOTE
Amos Walker's chief complaint for this visit includes:  Chief Complaint   Patient presents with     RECHECK     dermagraft      PCP: Racheal Swift    Referring Provider:  No referring provider defined for this encounter.    /62   Pulse 93   Temp 97.4  F (36.3  C) (Oral)   SpO2 97%   Data Unavailable     Do you need any medication refills at today's visit? no

## 2019-05-22 ENCOUNTER — OFFICE VISIT (OUTPATIENT)
Dept: PODIATRY | Facility: CLINIC | Age: 74
End: 2019-05-22
Payer: COMMERCIAL

## 2019-05-22 VITALS
TEMPERATURE: 98 F | SYSTOLIC BLOOD PRESSURE: 102 MMHG | OXYGEN SATURATION: 100 % | DIASTOLIC BLOOD PRESSURE: 57 MMHG | HEART RATE: 111 BPM

## 2019-05-22 DIAGNOSIS — E11.49 TYPE II OR UNSPECIFIED TYPE DIABETES MELLITUS WITH NEUROLOGICAL MANIFESTATIONS, NOT STATED AS UNCONTROLLED(250.60) (H): Primary | ICD-10-CM

## 2019-05-22 DIAGNOSIS — E11.51 DIABETES MELLITUS WITH PERIPHERAL VASCULAR DISEASE (H): ICD-10-CM

## 2019-05-22 DIAGNOSIS — L97.511 SKIN ULCER OF RIGHT FOOT, LIMITED TO BREAKDOWN OF SKIN (H): ICD-10-CM

## 2019-05-22 LAB
ANION GAP SERPL CALCULATED.3IONS-SCNC: 5 MMOL/L (ref 3–14)
BUN SERPL-MCNC: 38 MG/DL (ref 7–30)
CALCIUM SERPL-MCNC: 8.6 MG/DL (ref 8.5–10.1)
CHLORIDE SERPL-SCNC: 101 MMOL/L (ref 94–109)
CO2 SERPL-SCNC: 25 MMOL/L (ref 20–32)
CREAT SERPL-MCNC: 1.25 MG/DL (ref 0.66–1.25)
GFR SERPL CREATININE-BSD FRML MDRD: 57 ML/MIN/{1.73_M2}
GLUCOSE SERPL-MCNC: 235 MG/DL (ref 70–99)
POTASSIUM SERPL-SCNC: 4.9 MMOL/L (ref 3.4–5.3)
SODIUM SERPL-SCNC: 131 MMOL/L (ref 133–144)

## 2019-05-22 PROCEDURE — 36415 COLL VENOUS BLD VENIPUNCTURE: CPT | Performed by: INTERNAL MEDICINE

## 2019-05-22 PROCEDURE — 15271 SKIN SUB GRAFT TRNK/ARM/LEG: CPT | Performed by: PODIATRIST

## 2019-05-22 PROCEDURE — 80048 BASIC METABOLIC PNL TOTAL CA: CPT | Performed by: INTERNAL MEDICINE
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
## 2019-05-22 NOTE — NURSING NOTE
Amos Walker's chief complaint for this visit includes:  Chief Complaint   Patient presents with     RECHECK     dermagraft     PCP: Racheal Swift    Referring Provider:  No referring provider defined for this encounter.    /57   Pulse 111   Temp 98  F (36.7  C) (Oral)   SpO2 100%   Data Unavailable     Do you need any medication refills at today's visit? no

## 2019-05-22 NOTE — PATIENT INSTRUCTIONS
Thanks for coming today.  [facility]  [address] [place] [address]    To schedule future appointments in Ortho Clinic, you may call [phone].    To schedule ordered imaging by your Provider: Call [facility] at [phone]    [facility] available online at:   [email or web address]    Please call if any further questions or concerns [phone] and ask for the Orthopedic Department. Clinic hours 8 am to 5 pm.    Return to clinic if symptoms worsen.
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
## 2019-05-23 DIAGNOSIS — I10 BENIGN ESSENTIAL HYPERTENSION: ICD-10-CM

## 2019-05-23 RX ORDER — LISINOPRIL 20 MG/1
10 TABLET ORAL DAILY
Qty: 90 TABLET | Refills: 3
Start: 2019-05-23 | End: 2019-11-06

## 2019-05-23 NOTE — TELEPHONE ENCOUNTER
Rubio Chinchilla MD  P  Cardiology             Amos,     I think your blood pressure declined more than I want. Recommend to go back to 10 mg lisinopril daily. Monitor your BP and let us know if you see >130/80 mmHg.          See result note from Dr Chinchilla.     Call to pt: left vm responding to Dr Chinchilal message above, medication dose change.     Asked pt tcb or respond to my chart msg.

## 2019-05-29 ENCOUNTER — OFFICE VISIT (OUTPATIENT)
Dept: PODIATRY | Facility: CLINIC | Age: 74
End: 2019-05-29
Payer: COMMERCIAL

## 2019-05-29 VITALS — DIASTOLIC BLOOD PRESSURE: 67 MMHG | SYSTOLIC BLOOD PRESSURE: 124 MMHG | OXYGEN SATURATION: 99 % | HEART RATE: 104 BPM

## 2019-05-29 DIAGNOSIS — E11.49 TYPE II OR UNSPECIFIED TYPE DIABETES MELLITUS WITH NEUROLOGICAL MANIFESTATIONS, NOT STATED AS UNCONTROLLED(250.60) (H): Primary | ICD-10-CM

## 2019-05-29 DIAGNOSIS — L84 TYLOMA: ICD-10-CM

## 2019-05-29 DIAGNOSIS — E11.51 DIABETES MELLITUS WITH PERIPHERAL VASCULAR DISEASE (H): ICD-10-CM

## 2019-05-29 DIAGNOSIS — L97.912 ULCER OF RIGHT LOWER EXTREMITY WITH FAT LAYER EXPOSED (H): ICD-10-CM

## 2019-05-29 PROCEDURE — 99213 OFFICE O/P EST LOW 20 MIN: CPT | Performed by: PODIATRIST

## 2019-05-29 NOTE — PATIENT INSTRUCTIONS
Thanks for coming today.  Ortho/Sports Medicine Clinic  [address] [place] [address]    To schedule future appointments in Ortho Clinic, you may call [phone].    To schedule ordered imaging by your Provider: Call [facility] at [phone]    [employer] available online at:   [email or web address]    Please call if any further questions or concerns [phone] and ask for the Orthopedic Department. Clinic hours 8 am to 5 pm.    Return to clinic if symptoms worsen.

## 2019-05-29 NOTE — NURSING NOTE
Amos Walker's chief complaint for this visit includes:  Chief Complaint   Patient presents with     RECHECK     Right leg check      PCP: Racheal Swift    Referring Provider:  No referring provider defined for this encounter.    /67   Pulse 104   SpO2 99%   Data Unavailable     Do you need any medication refills at today's visit? no
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
## 2019-06-04 DIAGNOSIS — E11.9 DIABETES MELLITUS (H): ICD-10-CM

## 2019-06-04 RX ORDER — NATEGLINIDE 120 MG/1
120 TABLET ORAL
Qty: 90 TABLET | Refills: 1 | Status: SHIPPED | OUTPATIENT
Start: 2019-06-04 | End: 2019-08-13

## 2019-06-04 NOTE — TELEPHONE ENCOUNTER
Last Clinic Visit: 5/9/2019  [facility]

## 2019-06-06 ENCOUNTER — MYC MEDICAL ADVICE (OUTPATIENT)
Dept: INTERNAL MEDICINE | Facility: CLINIC | Age: 74
End: 2019-06-06

## 2019-06-06 ENCOUNTER — TELEPHONE (OUTPATIENT)
Dept: GASTROENTEROLOGY | Facility: CLINIC | Age: 74
End: 2019-06-06

## 2019-06-06 ENCOUNTER — OFFICE VISIT (OUTPATIENT)
Dept: PODIATRY | Facility: CLINIC | Age: 74
End: 2019-06-06
Payer: COMMERCIAL

## 2019-06-06 VITALS
TEMPERATURE: 97.8 F | SYSTOLIC BLOOD PRESSURE: 116 MMHG | HEART RATE: 50 BPM | DIASTOLIC BLOOD PRESSURE: 51 MMHG | OXYGEN SATURATION: 100 %

## 2019-06-06 DIAGNOSIS — E11.40 TYPE 2 DIABETES, CONTROLLED, WITH NEUROPATHY (H): Primary | ICD-10-CM

## 2019-06-06 DIAGNOSIS — E11.51 DIABETES MELLITUS WITH PERIPHERAL VASCULAR DISEASE (H): ICD-10-CM

## 2019-06-06 DIAGNOSIS — L97.512 SKIN ULCER OF RIGHT FOOT WITH FAT LAYER EXPOSED (H): ICD-10-CM

## 2019-06-06 DIAGNOSIS — E11.49 TYPE II OR UNSPECIFIED TYPE DIABETES MELLITUS WITH NEUROLOGICAL MANIFESTATIONS, NOT STATED AS UNCONTROLLED(250.60) (H): Primary | ICD-10-CM

## 2019-06-06 DIAGNOSIS — Z12.11 SPECIAL SCREENING FOR MALIGNANT NEOPLASMS, COLON: Primary | ICD-10-CM

## 2019-06-06 DIAGNOSIS — L97.912 ULCER OF RIGHT LOWER EXTREMITY WITH FAT LAYER EXPOSED (H): ICD-10-CM

## 2019-06-06 PROCEDURE — 99213 OFFICE O/P EST LOW 20 MIN: CPT | Performed by: PODIATRIST

## 2019-06-06 RX ORDER — CLINDAMYCIN HCL 300 MG
300 CAPSULE ORAL 2 TIMES DAILY
Qty: 28 CAPSULE | Refills: 0 | Status: SHIPPED | OUTPATIENT
Start: 2019-06-06 | End: 2019-06-10

## 2019-06-06 RX ORDER — BISACODYL 5 MG/1
5 TABLET, DELAYED RELEASE ORAL DAILY PRN
Qty: 4 TABLET | Refills: 0 | Status: SHIPPED | OUTPATIENT
Start: 2019-06-06 | End: 2019-08-28

## 2019-06-06 NOTE — NURSING NOTE
Amos Walker's chief complaint for this visit includes:  Chief Complaint   Patient presents with     Right Lower Leg - Follow Up, WOUND CARE     PCP: Racheal Swift    Referring Provider:  No referring provider defined for this encounter.    There were no vitals taken for this visit.  Data Unavailable     Do you need any medication refills at today's visit? No    Alpa Bowers LPN

## 2019-06-06 NOTE — TELEPHONE ENCOUNTER
Patient scheduled for Colonoscopy    Indication for procedure. History of colonic polyps    Referring Provider. Racheal Swift MD    ? no    Arrival time verified? 8:30 am June 12th    Facility location verified? [address]; [address]st floor    Instructions given regarding prep and procedure    Prep Type Sent e script to [facility] at  [place]; [place] due to comoridities: Diabetic   MD requests extended prep     Are you taking any anticoagulants or blood thinners? Yes, plavix. Instructed holding interval per endoscopy policy 5 days. May remain on ASA.     Instructions given? yes    Electronic implanted devices? no    Pre procedure teaching completed? Yes    Transportation from procedure? Yes, spouse will escort and remain with him 6 hours after exam    H&P / Pre op physical completed? No, conscious sedation

## 2019-06-06 NOTE — PATIENT INSTRUCTIONS
Thanks for coming today.  Ortho/Sports Medicine Clinic  [address] [place] [address]    To schedule future appointments in Ortho Clinic, you may call [phone].    To schedule ordered imaging by your provider:   Call [facility] [phone]    To schedule an injection ordered by your provider:  Call [facility] Injection scheduling line: [phone]  [email or web address] available online at:  [email or web address]    Please call if any further questions or concerns ([phone]).  Clinic hours 8 am to 5 pm.    Return to clinic (call) if symptoms worsen or fail to improve.
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
## 2019-06-07 RX ORDER — BISACODYL 5 MG/1
5 TABLET, DELAYED RELEASE ORAL DAILY PRN
Qty: 4 TABLET | Refills: 0 | Status: SHIPPED | OUTPATIENT
Start: 2019-06-07 | End: 2019-06-10

## 2019-06-07 NOTE — TELEPHONE ENCOUNTER
I added back Januvia, sent to [facility].  He can start this weekend.  Please continue to track fasting and 2 hours post prandial sugars and bring to next appt.  Thanks,  Racheal Swift MD  Internal Medicine

## 2019-06-10 ENCOUNTER — OFFICE VISIT (OUTPATIENT)
Dept: INTERNAL MEDICINE | Facility: CLINIC | Age: 74
End: 2019-06-10
Payer: COMMERCIAL

## 2019-06-10 VITALS
WEIGHT: 159 LBS | TEMPERATURE: 98 F | SYSTOLIC BLOOD PRESSURE: 147 MMHG | HEART RATE: 50 BPM | OXYGEN SATURATION: 99 % | DIASTOLIC BLOOD PRESSURE: 68 MMHG | BODY MASS INDEX: 20.14 KG/M2

## 2019-06-10 DIAGNOSIS — Z87.891 PERSONAL HISTORY OF TOBACCO USE: Primary | ICD-10-CM

## 2019-06-10 DIAGNOSIS — E11.51 TYPE 2 DIABETES MELLITUS WITH DIABETIC PERIPHERAL ANGIOPATHY WITHOUT GANGRENE, WITH LONG-TERM CURRENT USE OF INSULIN (H): ICD-10-CM

## 2019-06-10 DIAGNOSIS — Z79.4 TYPE 2 DIABETES MELLITUS WITH DIABETIC PERIPHERAL ANGIOPATHY WITHOUT GANGRENE, WITH LONG-TERM CURRENT USE OF INSULIN (H): ICD-10-CM

## 2019-06-10 ASSESSMENT — PAIN SCALES - GENERAL: PAINLEVEL: MILD PAIN (3)

## 2019-06-10 NOTE — NURSING NOTE
Chief Complaint   Patient presents with     Recheck Medication     4 week follow up per provider        MORE Elmore at 11:34 AM on 6/10/2019.

## 2019-06-10 NOTE — PROGRESS NOTES
[facility]  Primary Care Center   Racheal Swift MD  06/10/2019      Chief Complaint:   Recheck Medication       History of Present Illness:   Amos Walker is a anticoagulated 74 year old male on Plavix with a history of coronary artery disease, type 2 diabetes mellitus, chronic kidney disease, and emphysema who presents for a medication recheck. He was last seen by me in clinic on 5/9.    Diabetes:  He is highly motivated to get his diabetes under control. He reports that his blood sugars are fine in the morning (I.e. ) but not great the rest of the day (I.e. 200). He eats his dinner between 5 and 8 pm. He has been taking 8 units of Lantus daily and is still taking his januvia (started last week), glipizide, and starlix. He brought his glucometer today with his at-home blood sugar readings. He is currently doing extended colonoscopy preparation for his procedure on Wednesday (6/12). He is concerned about his blood pressure around this preparation and would like to hold it through Wednesday. He does think there is a some room for him to make dietary changes. He also has not been exercising lately. Of note, he developed an infection in his foot and is currently taking clindamycin for it. He states that there was some redness and it is swollen, but the redness is almost completely gone. He is seeing Dr. Mcclain in podiatry on Friday (6/14).     Blood pressure:  He is concerned about his morning blood pressures. His diastolic blood pressure is low (50s) in the morning (systolic 150s) and his pulse is around 50-60. He reports occasional dizziness and denies weakness. He endorses taking 10 mg of Lisinopril.     Other concerns discussed:  1. Has also been off Plavix, iron, and multivitamin for several days for his colonoscopy prep  2. Defer lung CT to after next appointment     Review of Systems:   Pertinent items are noted in HPI, remainder of complete ROS is negative.      Active Medications:      ammonium  "lactate (LAC-HYDRIN) 12 % cream, Apply topically 2 times daily as needed for dry skin, Disp: 385 g, Rfl: 3     ascorbic acid 500 MG TABS, Take 1 tablet (500 mg) by mouth 2 times daily, Disp: 30 tablet, Rfl:      ASPIRIN PO, Take 81 mg by mouth daily, Disp: , Rfl:      atorvastatin (LIPITOR) 20 MG tablet, Take 1 tablet (20 mg) by mouth daily Will start after simvastatin finished, Disp: 90 tablet, Rfl: 3     bisacodyl (DULCOLAX) 5 MG EC tablet, Take 1 tablet (5 mg) by mouth daily as needed for constipation, Disp: 4 tablet, Rfl: 0     clindamycin (CLEOCIN) 300 MG capsule, Take 1 capsule (300 mg) by mouth 2 times daily, Disp: 20 capsule, Rfl: 0     clobetasol propionate (OLUX) 0.05 % external foam, Use daily on scalp for 1-2 weeks, then stop; continue to use ketoconazole shampoo once weekly to prevent relapse, Disp: 100 g, Rfl: 0     clopidogrel (PLAVIX) 75 MG tablet, Take 1 tablet (75 mg) by mouth daily (Patient taking differently: Take 75 mg by mouth every evening ), Disp: 30 tablet, Rfl: 11     ferrous sulfate (IRON) 325 (65 FE) MG tablet, Take 1 tablet (325 mg) by mouth 2 times daily, Disp: 60 tablet, Rfl: 11     Gauze Pads & Dressings (OPTIFOAM) 6\"X6\" PADS, 1 Box once a week, Disp: 1 each, Rfl: 6     gentamicin (GARAMYCIN) 0.1 % external cream, Apply to left foot and leg ulcers., Disp: 30 g, Rfl: 5     glipiZIDE (GLUCOTROL) 10 MG tablet, Take 1 tablet (10 mg) by mouth 2 times daily (before meals), Disp: 60 tablet, Rfl: 1     insulin glargine (LANTUS SOLOSTAR) 100 UNIT/ML pen, Inject 16 Units Subcutaneous daily (with dinner) May take up to 22 units daily, Disp: 3 mL, Rfl: 3     insulin pen needle (B-D U/F) 31G X 8 MM miscellaneous, Use 1 daily as directed, Disp: 100 each, Rfl: 3     ketoconazole (NIZORAL) 2 % external shampoo, Apply topically twice a week Leave on for 3-5 min then rinse off; after 2-4 weeks use only once weekly, Disp: 120 mL, Rfl: 3     lisinopril (PRINIVIL/ZESTRIL) 20 MG tablet, Take 0.5 tablets " (10 mg) by mouth daily, Disp: 90 tablet, Rfl: 3     nateglinide (STARLIX) 120 MG tablet, Take 1 tablet (120 mg) by mouth 3 times daily (before meals) TAKE 1 TABLET BY MOUTH THREE TIMES DAILY BEFORE MEALS, Disp: 90 tablet, Rfl: 1     sildenafil (VIAGRA) 50 MG tablet, Take 1 tablet (50 mg) by mouth daily as needed for erectile dysfunction, Disp: 10 tablet, Rfl: 11     silver sulfADIAZINE (SILVADENE) 1 % external cream, Apply topically daily To affected areas on right foot and leg., Disp: 85 g, Rfl: 5     sitagliptin (JANUVIA) 100 MG tablet, Take 1 tablet (100 mg) by mouth daily, Disp: 30 tablet, Rfl: 0     triamcinolone (KENALOG) 0.1 % external cream, Apply sparingly to left heel daily., Disp: 60 g, Rfl: 1     VITAMIN D, CHOLECALCIFEROL, PO, Take 1,000 Units by mouth 2 times daily, Disp: , Rfl:       Allergies:   Lisinopril  Neomycin  Methylchloroisothiazolinone [methylisothiazolinone]  Povidone iodine      Past Medical History:  Anemia  Coronary artery disease (2 V CAD involving LAD and RCA, s/p DESx4 in 3/18)  Chronic kidney disease, stage 3  Emphysema  Hypertension  Peripheral artery disease (s/p R femoral enarterectomy and stenting)  Type 2 diabetes mellitus  Venous ulcer  Senile nuclear sclerosis  Peripheral vascular disease  Critical lower limb ischemia   Non-healing ulcer  Atherosclerosis, native arteries of right leg    Past Surgical History:  Angioplasty, femoral endarterectomy, right - 9/14/18  Angiogram - 3/2018  Total hip arthoplasty, left - 8/27/17  Right leg stent placement - 2010  Vein stripping, left leg - 2010  Bone removed and right foot debridement - 2009  Cervical disc fusion    Family History:   Colon cancer - father  Kidney disease - father, mother  Cardiovascular disease (myocardial infarction) - son (onset in 40s)  Macular degeneration - brother      Social History:   The patient is , a current every day smoker (0.5 ppd, used to smoke more heavily), and does not consume alcohol.      Physical Exam:   /68 (BP Location: Right arm, Patient Position: Sitting, Cuff Size: Adult Regular)   Pulse 50   Temp 98  F (36.7  C) (Oral)   Wt 72.1 kg (159 lb)   SpO2 99%   BMI 20.14 kg/m     Constitutional: Alert, oriented, pleasant, no acute distress  Head: Normocephalic, atraumatic  Eyes: Extra-ocular movements intact, no scleral icterus  ENT: Oropharynx clear, moist mucus membranes, good dentition  Neck: Supple, no lymphadenopathy, no thyromegaly   Cardiovascular: Regular rate and rhythm, no murmurs, rubs or gallops, peripheral pulses full/symmetric  Respiratory: Good air movement bilaterally, lungs clear, no wheezes/rales/rhonchi  GI: Abdomen soft, bowel sounds present, nondistended, nontender, no organomegaly or masses, no rebound/guarding  Musculoskeletal: No edema, normal muscle tone, normal gait  Neurologic: Alert and oriented, cranial nerves 2-12 intact.  Skin: No rashes/lesions  Psychiatric: normal mentation, affect and mood     Assessment and Plan:  1. Type 2 diabetes mellitus with diabetic peripheral angiopathy without gangrene, with long-term current use of insulin (H)  He will hold lisinopril, Lantus, glipizide, and Starlix tomorrow and Wednesday (6/11-6/12) due to his colonoscopy preparation and just take Januvia. Will try switching to Trulicity instead of Lantus after colonoscopy to see if he can lower his post-prandial blood glucose without dropping his morning levels too low. I instructed him to monitor his blood sugar in the morning and after dinner. Post-prandial blood glucose goal: less than 180, 2 hours after starting dinner. If he is unable to achieve his goals on this regimen, we discussed that we would likely need to start mealtime insulin.  - insulin glargine (LANTUS SOLOSTAR PEN) 100 UNIT/ML pen  Dispense: 3 mL; Refill: 1  - dulaglutide (TRULICITY) 0.75 MG/0.5ML pen  Dispense: 1 mL; Refill: 1    2. Personal history of tobacco use  He defers screening at this  time.    Follow-up: Return in about 2 weeks (around 6/24/2019) for Routine Visit.         Scribe Disclosure:  I, Bree Blakehoraciomaylinjolene, am serving as a scribe to document services personally performed by Racheal Swift MD at this visit, based upon the provider's statements to me. All documentation has been reviewed by the aforementioned provider prior to being entered into the official medical record.     Portions of this medical record were completed by a scribe. UPON MY REVIEW AND AUTHENTICATION BY ELECTRONIC SIGNATURE, this confirms (a) I performed the applicable clinical services, and (b) the record is accurate.   Racheal Siwft MD  Internal Medicine    25 min spent face to face, of which >50% time spent on counseling/coordinating care exclusive of any procedure time

## 2019-06-10 NOTE — PATIENT INSTRUCTIONS
[facility] Medication Refill Request Information:  * Please contact your pharmacy regarding ANY request for medication refills.  ** [facility] Prescription Fax = [phone]  * Please allow 3 business days for routine medication refills.  * Please allow 5 business days for controlled substance medication refills.     [facility] Test Result notification information:  *You will be notified with in 7-10 days of your appointment day regarding the results of your test.  If you are on MyChart you will be notified as soon as the provider has reviewed the results and signed off on them.    [facility]: [phone]       Start new medication/injection for diabetes.  Once you start this, stop lantus until we know how your sugars are.  Don't make any changes until after colonoscopy is done.   Keep tracking sugars 2 hours after meals, goal is less than 180.      Dear patient:    Your healthcare provider has recommended that you receive the new shingles vaccine called Shingrix to prevent shingles for patients ages 50 and above.   Many private insurance and Medicare part D plans cover Shingrix. However, not all insurance carriers cover the entire cost of the Shingrix vaccine if the vaccine is administered at a primary clinic. The clinic cannot determine your insurance benefits. Please call your insurance carrier prior to getting the vaccine.     Currently there is a nationwide shortage of the Shingrix vaccine. Because the second dose needs to be given 2-6 months from the first dose, the CDC DOES NOT RECOMMEND your 1st Shingrix vaccine until the vaccine is readily back in stock next year (2020).     Prior to receiving the vaccine, we recommend that you call your insurance carrier and ask them the following questions:     * Does my insurance cover the Shingrix vaccine and administration of the vaccine?   * What is my co-pay or deductible for the vaccine?   * Is there a cost difference if I receive the vaccine  at my doctors office or at a pharmacy?     If you decide to receive your vaccine at the [facility] please call [phone] and request a nurse only appointment for the Shingrix vaccine.     This vaccine comes in two doses. The second dose should be 2-6 months from your first Shingrix dose.       Lung Cancer Screening   Frequently Asked Questions  If you are at high-risk for lung cancer, getting screened with low-dose computed tomography (LDCT) every year can help save your life. This handout offers answers to some of the most common questions about lung cancer screening. If you have other questions, please call [phone] ([phone]).     What is it?  Lung cancer screening uses special X-ray technology to create an image of your lung tissue. The exam is quick and easy and takes less than 10 seconds. We don t give you any medicine or use any needles. You can eat before and after the exam. You don t need to change your clothes as long as the clothing on your chest doesn t contain metal. But, you do need to be able to hold your breath for at least 6 seconds during the exam.    What is the goal of lung cancer screening?  The goal of lung cancer screening is to save lives. Many times, lung cancer is not found until a person starts having physical symptoms. Lung cancer screening can help detect lung cancer in the earliest stages when it may be easier to treat.    Who should be screened for lung cancer?  We suggest lung cancer screening for anyone who is at high-risk for lung cancer. You are in the high-risk group if you:      are between the ages of 55 and 79, and    have smoked at least 1 pack of cigarettes a day for 30 or more years, and    still smoke or have quit within the past 15 years.    However, if you have a new cough or shortness of breath, you should talk to your doctor before being screened.    Some national lung health advocacy groups also recommend screening for people ages 50 to 79  who have smoked an average of 1 pack of cigarettes a day for 20 years. They must also have at least 1 other risk factor for lung cancer, not including exposure to secondhand smoke. Other risk factors are having had cancer in the past, emphysema, pulmonary fibrosis, COPD, a family history of lung cancer, or exposure to certain materials such as arsenic, asbestos, beryllium, cadmium, chromium, diesel fumes, nickel, radon or silica. Your care team can help you know if you have one of these risk factors.     Why does it matter if I have symptoms?  Certain symptoms can be a sign that you have a condition in your lungs that should be checked and treated by your doctor. These symptoms include fever, chest pain, a new or changing cough, shortness of breath that you have never felt before, coughing up blood or unexplained weight loss. Having any of these symptoms can greatly affect the results of lung cancer screening.       Should all smokers get an LDCT lung cancer screening exam?  It depends. Lung cancer screening is for a very specific group of men and women who have a history of heavy smoking over a long period of time (see  Who should be screened for lung cancer  above).  I am in the high-risk group, but have been diagnosed with cancer in the past. Is LDCT lung cancer screening right for me?  In some cases, you should not have LDCT lung screening, such as when your doctor is already following your cancer with CT scan studies. Your doctor will help you decide if LDCT lung screening is right for you.  Do I need to have a screening exam every year?  Yes. If you are in the high-risk group described earlier, you should get an LDCT lung cancer screening exam every year until you are 79, or are no longer willing or able to undergo screening and possible procedures to diagnose and treat lung cancer.  How effective is LDCT at preventing death from lung cancer?  Studies have shown that LDCT lung cancer screening can lower the  risk of death from lung cancer by 20 percent in people who are at high-risk.  What are the risks?  There are some risks and limitations of LDCT lung cancer screening. We want to make sure you understand the risks and benefits, so please let us know if you have any questions. Your doctor may want to talk with you more about these risks.    Radiation exposure: As with any exam that uses radiation, there is a very small increased risk of cancer. The amount of radiation in LDCT is small--about the same amount a person would get from a mammogram. Your doctor orders the exam when he or she feels the potential benefits outweigh the risks.    False negatives: No test is perfect, including LDCT. It is possible that you may have a medical condition, including lung cancer, that is not found during your exam. This is called a false negative result.    False positives and more testing: LDCT very often finds something in the lung that could be cancer, but in fact is not. This is called a false positive result. False positive tests often cause anxiety. To make sure these findings are not cancer, you may need to have more tests. These tests will be done only if you give us permission. Sometimes patients need a treatment that can have side effects, such as a biopsy. For more information on false positives, see  What can I expect from the results?     Findings not related to lung cancer: Your LDCT exam also takes pictures of areas of your body next to your lungs. In a very small number of cases, the CT scan will show an abnormal finding in one of these areas, such as your kidneys, adrenal glands, liver or thyroid. This finding may not be serious, but you may need more tests. Your doctor can help you decide what other tests you may need, if any.  What can I expect from the results?  About 1 out of 4 LDCT exams will find something that may need more tests. Most of the time, these findings are lung nodules. Lung nodules are very small  collections of tissue in the lung. These nodules are very common, and the vast majority--more than 97 percent--are not cancer (benign). Most are normal lymph nodes or small areas of scarring from past infections.  But, if a small lung nodule is found to be cancer, the cancer can be cured more than 90 percent of the time. To know if the nodule is cancer, we may need to get more images before your next yearly screening exam. If the nodule has suspicious features (for example, it is large, has an odd shape or grows over time), we will refer you to a specialist for further testing.  Will my doctor also get the results?  Yes. Your doctor will get a copy of your results.  Is it okay to keep smoking now that there s a cancer screening exam?  No. Tobacco is one of the strongest cancer-causing agents. It causes not only lung cancer, but other cancers and cardiovascular (heart) diseases as well. The damage caused by smoking builds over time. This means that the longer you smoke, the higher your risk of disease. While it is never too late to quit, the sooner you quit, the better.  Where can I find help to quit smoking?  The best way to prevent lung cancer is to stop smoking. If you have already quit smoking, congratulations and keep it up! For help on quitting smoking, please call [employer] at 1-027-359-XOJY (8063) or the American Cancer Society at 1-173.904.2198 to find local resources near you.  One-on-one health coaching:  If you d prefer to work individually with a health care provider on tobacco cessation, we offer:      Medication Therapy Management:  Our specially trained pharmacists work closely with you and your doctor to help you quit smoking.  Call [phone] or [phone] (toll free).     Can Do: Health coaching offered by [facility].  [email or web address]

## 2019-06-12 ENCOUNTER — TELEPHONE (OUTPATIENT)
Dept: INTERNAL MEDICINE | Facility: CLINIC | Age: 74
End: 2019-06-12

## 2019-06-12 ENCOUNTER — HOSPITAL ENCOUNTER (OUTPATIENT)
Facility: CLINIC | Age: 74
Discharge: HOME | End: 2019-06-12
Attending: INTERNAL MEDICINE | Admitting: INTERNAL MEDICINE
Payer: COMMERCIAL

## 2019-06-12 VITALS
HEIGHT: 75 IN | BODY MASS INDEX: 20.14 KG/M2 | OXYGEN SATURATION: 97 % | SYSTOLIC BLOOD PRESSURE: 147 MMHG | HEART RATE: 90 BPM | RESPIRATION RATE: 27 BRPM | DIASTOLIC BLOOD PRESSURE: 58 MMHG

## 2019-06-12 LAB
COLONOSCOPY: NORMAL
GLUCOSE BLDC GLUCOMTR-MCNC: 124 MG/DL (ref 70–99)

## 2019-06-12 PROCEDURE — 45382 COLONOSCOPY W/CONTROL BLEED: CPT | Performed by: INTERNAL MEDICINE

## 2019-06-12 PROCEDURE — 45381 COLONOSCOPY SUBMUCOUS NJX: CPT | Performed by: INTERNAL MEDICINE

## 2019-06-12 PROCEDURE — 99153 MOD SED SAME PHYS/QHP EA: CPT | Performed by: INTERNAL MEDICINE

## 2019-06-12 PROCEDURE — 88305 TISSUE EXAM BY PATHOLOGIST: CPT | Performed by: INTERNAL MEDICINE

## 2019-06-12 PROCEDURE — 25000128 H RX IP 250 OP 636: Performed by: INTERNAL MEDICINE

## 2019-06-12 PROCEDURE — 45385 COLONOSCOPY W/LESION REMOVAL: CPT | Performed by: INTERNAL MEDICINE

## 2019-06-12 PROCEDURE — 45380 COLONOSCOPY AND BIOPSY: CPT | Performed by: INTERNAL MEDICINE

## 2019-06-12 PROCEDURE — G0500 MOD SEDAT ENDO SERVICE >5YRS: HCPCS | Performed by: INTERNAL MEDICINE

## 2019-06-12 PROCEDURE — 82962 GLUCOSE BLOOD TEST: CPT

## 2019-06-12 RX ORDER — FENTANYL CITRATE 50 UG/ML
INJECTION, SOLUTION INTRAMUSCULAR; INTRAVENOUS PRN
Status: DISCONTINUED | OUTPATIENT
Start: 2019-06-12 | End: 2019-06-12 | Stop reason: HOSPADM

## 2019-06-12 RX ORDER — LIDOCAINE 40 MG/G
CREAM TOPICAL
Status: DISCONTINUED | OUTPATIENT
Start: 2019-06-12 | End: 2019-06-12 | Stop reason: HOSPADM

## 2019-06-12 RX ORDER — ONDANSETRON 2 MG/ML
4 INJECTION INTRAMUSCULAR; INTRAVENOUS
Status: DISCONTINUED | OUTPATIENT
Start: 2019-06-12 | End: 2019-06-12 | Stop reason: HOSPADM

## 2019-06-12 NOTE — TELEPHONE ENCOUNTER
Soon Mi,  I have been working with Amos on diabetes control to see if we can improve post-prandial glucoses without starting meal time insulin. Can we call Amos this week to see if he was able to  and start Trulicity and if he has any questions?  Thanks,  Racheal Swift MD  Internal Medicine

## 2019-06-12 NOTE — OR NURSING
"Colonoscopy tolerated well with sedation given.  Patient said he thought he would be out for the procedure. After initial sedation he did sleep for a period of time but then was awake for about 2/3 of the procedure. There a couple of times where patient stated he was \"hurting a little.\"  3 polyps removed, cecum, transverse and sigmoid; cold snare, hot and hot respectively. To repeat colonoscopy in 5 years.  "

## 2019-06-13 LAB — COPATH REPORT: NORMAL

## 2019-06-13 NOTE — TELEPHONE ENCOUNTER
Spoke with pt and informed the recommendation as below. He will  trulicity tomorrow and try it. He will discuss the effectiveness of trulicity during the appt on 6/24.

## 2019-06-14 ENCOUNTER — OFFICE VISIT (OUTPATIENT)
Dept: PODIATRY | Facility: CLINIC | Age: 74
End: 2019-06-14
Payer: COMMERCIAL

## 2019-06-14 VITALS — SYSTOLIC BLOOD PRESSURE: 143 MMHG | DIASTOLIC BLOOD PRESSURE: 70 MMHG | HEART RATE: 98 BPM | OXYGEN SATURATION: 99 %

## 2019-06-14 DIAGNOSIS — E11.49 TYPE II OR UNSPECIFIED TYPE DIABETES MELLITUS WITH NEUROLOGICAL MANIFESTATIONS, NOT STATED AS UNCONTROLLED(250.60) (H): Primary | ICD-10-CM

## 2019-06-14 DIAGNOSIS — E11.51 DIABETES MELLITUS WITH PERIPHERAL VASCULAR DISEASE (H): ICD-10-CM

## 2019-06-14 DIAGNOSIS — L97.912 ULCER OF RIGHT LOWER EXTREMITY WITH FAT LAYER EXPOSED (H): ICD-10-CM

## 2019-06-14 DIAGNOSIS — L97.512 SKIN ULCER OF RIGHT FOOT WITH FAT LAYER EXPOSED (H): ICD-10-CM

## 2019-06-14 PROCEDURE — 99213 OFFICE O/P EST LOW 20 MIN: CPT | Performed by: PODIATRIST

## 2019-06-14 NOTE — PATIENT INSTRUCTIONS
Thanks for coming today.  [facility]  [address] [place] [address]    To schedule future appointments in Ortho Clinic, you may call [phone].    To schedule ordered imaging by your provider:   Call [facility] [phone]    To schedule an injection ordered by your provider:  Call [facility] Injection scheduling line: [phone]  [email or web address] available online at:  [email or web address]    Please call if any further questions or concerns ([phone]).  Clinic hours 8 am to 5 pm.    Return to clinic (call) if symptoms worsen or fail to improve.
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
## 2019-06-14 NOTE — NURSING NOTE
Amos Walker's chief complaint for this visit includes:  Chief Complaint   Patient presents with     Right Leg - Follow Up, WOUND CARE     PCP: Racheal Swift    Referring Provider:  No referring provider defined for this encounter.    /70 (BP Location: Left arm, Patient Position: Sitting, Cuff Size: Adult Regular)   Pulse 98   SpO2 99%   Data Unavailable     Do you need any medication refills at today's visit? No    Alpa Bowers LPN
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
## 2019-06-17 PROBLEM — Z79.01 LONG TERM CURRENT USE OF ANTICOAGULANT THERAPY: Status: ACTIVE | Noted: 2017-09-20

## 2019-06-19 ENCOUNTER — OFFICE VISIT (OUTPATIENT)
Dept: PODIATRY | Facility: CLINIC | Age: 74
End: 2019-06-19
Payer: COMMERCIAL

## 2019-06-19 VITALS — OXYGEN SATURATION: 98 % | SYSTOLIC BLOOD PRESSURE: 137 MMHG | HEART RATE: 104 BPM | DIASTOLIC BLOOD PRESSURE: 70 MMHG

## 2019-06-19 DIAGNOSIS — L97.512 SKIN ULCER OF RIGHT FOOT WITH FAT LAYER EXPOSED (H): ICD-10-CM

## 2019-06-19 DIAGNOSIS — E11.49 TYPE II OR UNSPECIFIED TYPE DIABETES MELLITUS WITH NEUROLOGICAL MANIFESTATIONS, NOT STATED AS UNCONTROLLED(250.60) (H): Primary | ICD-10-CM

## 2019-06-19 DIAGNOSIS — E11.51 DIABETES MELLITUS WITH PERIPHERAL VASCULAR DISEASE (H): ICD-10-CM

## 2019-06-19 DIAGNOSIS — L97.912 ULCER OF RIGHT LOWER EXTREMITY WITH FAT LAYER EXPOSED (H): ICD-10-CM

## 2019-06-19 PROCEDURE — 99213 OFFICE O/P EST LOW 20 MIN: CPT | Performed by: PODIATRIST

## 2019-06-19 NOTE — PATIENT INSTRUCTIONS
Thanks for coming today.  Ortho[facility] Medicine Clinic  [address] [place] [address]    To schedule future appointments in Ortho Clinic, you may call [phone].    To schedule ordered imaging by your Provider: Call [facility] at [phone]    [employer] available online at:   [email or web address]    Please call if any further questions or concerns [phone] and ask for the Orthopedic Department. Clinic hours 8 am to 5 pm.    Return to clinic if symptoms worsen.
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
## 2019-06-19 NOTE — NURSING NOTE
Amos Walker's chief complaint for this visit includes:  Chief Complaint   Patient presents with     RECHECK     right leg recheck      PCP: Racheal Swift    Referring Provider:  No referring provider defined for this encounter.    /70   Pulse 104   SpO2 98%   Data Unavailable     Do you need any medication refills at today's visit? no

## 2019-06-20 ENCOUNTER — OFFICE VISIT (OUTPATIENT)
Dept: OPHTHALMOLOGY | Facility: CLINIC | Age: 74
End: 2019-06-20
Attending: OPHTHALMOLOGY
Payer: COMMERCIAL

## 2019-06-20 DIAGNOSIS — E11.3293 MILD NONPROLIFERATIVE DIABETIC RETINOPATHY OF BOTH EYES ASSOCIATED WITH TYPE 2 DIABETES MELLITUS, MACULAR EDEMA PRESENCE UNSPECIFIED (H): Primary | ICD-10-CM

## 2019-06-20 DIAGNOSIS — H25.813 COMBINED FORMS OF AGE-RELATED CATARACT OF BOTH EYES: ICD-10-CM

## 2019-06-20 PROCEDURE — G0463 HOSPITAL OUTPT CLINIC VISIT: HCPCS | Mod: ZF

## 2019-06-20 PROCEDURE — 92015 DETERMINE REFRACTIVE STATE: CPT | Mod: ZF

## 2019-06-20 RX ORDER — CLINDAMYCIN HCL 300 MG
300 CAPSULE ORAL 2 TIMES DAILY
Qty: 28 CAPSULE | Refills: 0 | Status: SHIPPED | OUTPATIENT
Start: 2019-06-20 | End: 2019-06-24

## 2019-06-20 ASSESSMENT — CUP TO DISC RATIO
OS_RATIO: 0.35
OD_RATIO: 0.35

## 2019-06-20 ASSESSMENT — VISUAL ACUITY
OS_PH_CC: 20/40
OS_CC: 20/60
METHOD: SNELLEN - LINEAR
CORRECTION_TYPE: GLASSES
OS_CC+: -2
OD_CC: 20/30

## 2019-06-20 ASSESSMENT — CONF VISUAL FIELD
METHOD: COUNTING FINGERS
OS_NORMAL: 1
OD_NORMAL: 1

## 2019-06-20 ASSESSMENT — REFRACTION_WEARINGRX
OD_CYLINDER: +3.25
OS_CYLINDER: +1.25
OS_AXIS: 008
OS_SPHERE: -1.50
OD_SPHERE: -2.00
OD_AXIS: 170

## 2019-06-20 ASSESSMENT — REFRACTION_MANIFEST
OD_CYLINDER: +3.50
OS_SPHERE: -2.25
OD_SPHERE: -2.50
OS_AXIS: 170
OS_ADD: +2.75
OD_ADD: +2.75
OD_AXIS: 005
OS_CYLINDER: +1.25

## 2019-06-20 ASSESSMENT — TONOMETRY
OS_IOP_MMHG: 15
OD_IOP_MMHG: 13
IOP_METHOD: TONOPEN

## 2019-06-20 ASSESSMENT — EXTERNAL EXAM - RIGHT EYE: OD_EXAM: NORMAL

## 2019-06-20 ASSESSMENT — EXTERNAL EXAM - LEFT EYE: OS_EXAM: NORMAL

## 2019-06-20 ASSESSMENT — SLIT LAMP EXAM - LIDS
COMMENTS: NORMAL
COMMENTS: NORMAL

## 2019-06-20 NOTE — NURSING NOTE
Chief Complaints and History of Present Illnesses   Patient presents with     Diabetic Eye Exam     1 year follow up      Chief Complaint(s) and History of Present Illness(es)     Diabetic Eye Exam     Comments: 1 year follow up               Comments     Pt states vision appearing more blurry in both eyes. Pt notes that he just passed his drivers exam.  No new floaters. No redness or dryness. Some irritation in both eyes now and then, pt attributes it to pollen.  DM2 BS: 106 this morning.  Lab Results       Component                Value               Date                       A1C                      6.7                 03/27/2019                 A1C                      7.2                 11/16/2018                 A1C                      6.6                 06/21/2018                 A1C                      5.8                 04/27/2018                 A1C                      6.5                 10/25/2017              Claudette Valencia [facility] June 20, 2019 10:27 AM

## 2019-06-20 NOTE — PROGRESS NOTES
I have confirmed the patient's Past Medical History, Past Surgical History, Social History, Family History, Problem List, Medication List and Technician note.    CC: follow up diabetes    HPI: more and more difficulty reading,  Patient wonders about Cataract extraction    Amos Walker is a 74 year old male with the following ophthalmologic problems:    Here for diabetic retinopathy check. Notes blurry vision in the left eye. Blood sugars have been fairly well controlled. Following up with endocrinologist next week.  History of DM2 x 25yrs, on insulin, most recent A1c, most recent HbA1c: 6.7 (3/2019)      ASSESSMENT/PLAN  1. 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 Patient advised that failure to adequately control each may lead to vision loss. The patient expressed understanding.  - OCT today without macular edema    2. Cataracts OU  - visually significant OU left eye > right eye   - discussed cataract surgery, he prefers to proceed at this time,   - monitor yearly     3. Refractive Error  - BCVA with updated MRx 20/30 OS and 20/40 OD  - new Rx provided per pt's request     4. Drusen BE  - not AMD, mild      return to clinic: 1 year dilated fundus exam       Jen Andersen MD PhD.  Professor & Chair

## 2019-06-24 ENCOUNTER — OFFICE VISIT (OUTPATIENT)
Dept: INTERNAL MEDICINE | Facility: CLINIC | Age: 74
End: 2019-06-24
Payer: COMMERCIAL

## 2019-06-24 VITALS
SYSTOLIC BLOOD PRESSURE: 136 MMHG | BODY MASS INDEX: 19.64 KG/M2 | DIASTOLIC BLOOD PRESSURE: 66 MMHG | OXYGEN SATURATION: 98 % | HEART RATE: 96 BPM | WEIGHT: 153 LBS | HEIGHT: 74 IN

## 2019-06-24 DIAGNOSIS — D64.9 ANEMIA, UNSPECIFIED TYPE: ICD-10-CM

## 2019-06-24 DIAGNOSIS — R53.83 FATIGUE, UNSPECIFIED TYPE: Primary | ICD-10-CM

## 2019-06-24 DIAGNOSIS — R53.83 FATIGUE, UNSPECIFIED TYPE: ICD-10-CM

## 2019-06-24 DIAGNOSIS — L97.912 ULCER OF RIGHT LOWER LEG, WITH FAT LAYER EXPOSED (H): ICD-10-CM

## 2019-06-24 DIAGNOSIS — K59.00 CONSTIPATION, UNSPECIFIED CONSTIPATION TYPE: ICD-10-CM

## 2019-06-24 DIAGNOSIS — I87.311 CHRONIC VENOUS HYPERTENSION (IDIOPATHIC) WITH ULCER OF RIGHT LOWER EXTREMITY (CODE) (H): ICD-10-CM

## 2019-06-24 DIAGNOSIS — E11.40 TYPE 2 DIABETES, CONTROLLED, WITH NEUROPATHY (H): ICD-10-CM

## 2019-06-24 LAB
ERYTHROCYTE [DISTWIDTH] IN BLOOD BY AUTOMATED COUNT: 13 % (ref 10–15)
HCT VFR BLD AUTO: 29.2 % (ref 40–53)
HGB BLD-MCNC: 9.1 G/DL (ref 13.3–17.7)
MCH RBC QN AUTO: 30.3 PG (ref 26.5–33)
MCHC RBC AUTO-ENTMCNC: 31.2 G/DL (ref 31.5–36.5)
MCV RBC AUTO: 97 FL (ref 78–100)
PLATELET # BLD AUTO: 301 10E9/L (ref 150–450)
RBC # BLD AUTO: 3 10E12/L (ref 4.4–5.9)
WBC # BLD AUTO: 8.6 10E9/L (ref 4–11)

## 2019-06-24 RX ORDER — SILVER SULFADIAZINE 10 MG/G
CREAM TOPICAL DAILY
Qty: 85 G | Refills: 5 | Status: SHIPPED | OUTPATIENT
Start: 2019-06-24 | End: 2020-01-29

## 2019-06-24 RX ORDER — POLYETHYLENE GLYCOL 3350 17 G/17G
1 POWDER, FOR SOLUTION ORAL DAILY
Qty: 255 G | Refills: 1 | Status: SHIPPED | OUTPATIENT
Start: 2019-06-24 | End: 2020-06-03

## 2019-06-24 ASSESSMENT — PAIN SCALES - GENERAL: PAINLEVEL: MODERATE PAIN (4)

## 2019-06-24 ASSESSMENT — MIFFLIN-ST. JEOR: SCORE: 1521.5
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
## 2019-06-24 NOTE — NURSING NOTE
Chief Complaint   Patient presents with     Recheck Medication     2 week follow up per pt        Razia Self EMT at 2:33 PM on 6/24/2019.

## 2019-06-24 NOTE — PATIENT INSTRUCTIONS
[facility] Medication Refill Request Information:  * Please contact your pharmacy regarding ANY request for medication refills.  ** [facility] Prescription Fax = [phone]  * Please allow 3 business days for routine medication refills.  * Please allow 5 business days for controlled substance medication refills.     [facility] Test Result notification information:  *You will be notified with in 7-10 days of your appointment day regarding the results of your test.  If you are on MyChart you will be notified as soon as the provider has reviewed the results and signed off on them.    [facility]: [phone]     Diabetes Education- [phone]

## 2019-06-25 ENCOUNTER — TELEPHONE (OUTPATIENT)
Dept: INTERNAL MEDICINE | Facility: CLINIC | Age: 74
End: 2019-06-25

## 2019-06-25 NOTE — TELEPHONE ENCOUNTER
Please call patient:  His labs show he is a bit anemic again, which may be contributing to fatigue.  I am not sure the cause though sometimes chronic illness can cause anemia. I would recommend he try to eat a balanced diet with iron rich foods. I would also recommend we repeat his labs and check some additional labs in about 2 weeks for close monitoring. If he experiences any worsening of symptoms in the meantime he should let us know.  Future lab orders placed.  Thanks,  Racheal Swift MD  Internal Medicine

## 2019-06-26 NOTE — TELEPHONE ENCOUNTER
Spoke with pt, informed the results and recommendations. He will have the labs in 2 weeks.  Also he has concern about his BG and will send the [employer] message to us for review.

## 2019-07-03 ENCOUNTER — MYC MEDICAL ADVICE (OUTPATIENT)
Dept: INTERNAL MEDICINE | Facility: CLINIC | Age: 74
End: 2019-07-03

## 2019-07-03 DIAGNOSIS — Z79.4 TYPE 2 DIABETES MELLITUS WITH DIABETIC PERIPHERAL ANGIOPATHY WITHOUT GANGRENE, WITH LONG-TERM CURRENT USE OF INSULIN (H): ICD-10-CM

## 2019-07-03 DIAGNOSIS — E11.51 TYPE 2 DIABETES MELLITUS WITH DIABETIC PERIPHERAL ANGIOPATHY WITHOUT GANGRENE, WITH LONG-TERM CURRENT USE OF INSULIN (H): ICD-10-CM

## 2019-07-05 DIAGNOSIS — E11.9 TYPE 2 DIABETES, HBA1C GOAL < 7% (H): ICD-10-CM

## 2019-07-09 RX ORDER — SITAGLIPTIN 100 MG/1
TABLET, FILM COATED ORAL
Qty: 90 TABLET | Refills: 0 | Status: SHIPPED | OUTPATIENT
Start: 2019-07-09 | End: 2019-08-13

## 2019-07-09 NOTE — TELEPHONE ENCOUNTER
Please call patient:  I have reviewed his BG readings. It seems morning ones are mostly 100-110 but 2 hours after meals are above 200?  I would suggest we increase his trulicity dose to 1.5 mg. I sent in a new rx presuming he is amenable.  Thanks,  Racheal Swift MD  Internal Medicine

## 2019-07-09 NOTE — TELEPHONE ENCOUNTER
Last Clinic Visit: 6/24/19  Primary Care Clinic     90 day to pharmacy.   See encounter -  My C Medial Advice 6/6/19.

## 2019-07-10 ENCOUNTER — TELEPHONE (OUTPATIENT)
Dept: INTERNAL MEDICINE | Facility: CLINIC | Age: 74
End: 2019-07-10

## 2019-07-10 ENCOUNTER — OFFICE VISIT (OUTPATIENT)
Dept: PODIATRY | Facility: CLINIC | Age: 74
End: 2019-07-10
Payer: COMMERCIAL

## 2019-07-10 VITALS
DIASTOLIC BLOOD PRESSURE: 59 MMHG | SYSTOLIC BLOOD PRESSURE: 130 MMHG | OXYGEN SATURATION: 100 % | HEART RATE: 101 BPM | TEMPERATURE: 97.5 F

## 2019-07-10 DIAGNOSIS — E11.49 TYPE II OR UNSPECIFIED TYPE DIABETES MELLITUS WITH NEUROLOGICAL MANIFESTATIONS, NOT STATED AS UNCONTROLLED(250.60) (H): Primary | ICD-10-CM

## 2019-07-10 DIAGNOSIS — L97.512 SKIN ULCER OF RIGHT FOOT WITH FAT LAYER EXPOSED (H): ICD-10-CM

## 2019-07-10 DIAGNOSIS — L97.912 ULCER OF RIGHT LOWER EXTREMITY WITH FAT LAYER EXPOSED (H): ICD-10-CM

## 2019-07-10 DIAGNOSIS — E11.51 DIABETES MELLITUS WITH PERIPHERAL VASCULAR DISEASE (H): ICD-10-CM

## 2019-07-10 DIAGNOSIS — D64.9 ANEMIA, UNSPECIFIED TYPE: ICD-10-CM

## 2019-07-10 LAB
BASOPHILS # BLD AUTO: 0.1 10E9/L (ref 0–0.2)
BASOPHILS NFR BLD AUTO: 0.8 %
DIFFERENTIAL METHOD BLD: ABNORMAL
EOSINOPHIL # BLD AUTO: 0.1 10E9/L (ref 0–0.7)
EOSINOPHIL NFR BLD AUTO: 0.9 %
ERYTHROCYTE [DISTWIDTH] IN BLOOD BY AUTOMATED COUNT: 13.2 % (ref 10–15)
FERRITIN SERPL-MCNC: 366 NG/ML (ref 26–388)
FOLATE SERPL-MCNC: 13.6 NG/ML
HCT VFR BLD AUTO: 26.9 % (ref 40–53)
HGB BLD-MCNC: 8.5 G/DL (ref 13.3–17.7)
IMM GRANULOCYTES # BLD: 0.1 10E9/L (ref 0–0.4)
IMM GRANULOCYTES NFR BLD: 1.2 %
IRON SATN MFR SERPL: 14 % (ref 15–46)
IRON SERPL-MCNC: 24 UG/DL (ref 35–180)
LDH SERPL L TO P-CCNC: 134 U/L (ref 85–227)
LYMPHOCYTES # BLD AUTO: 0.7 10E9/L (ref 0.8–5.3)
LYMPHOCYTES NFR BLD AUTO: 10.6 %
MCH RBC QN AUTO: 29.8 PG (ref 26.5–33)
MCHC RBC AUTO-ENTMCNC: 31.6 G/DL (ref 31.5–36.5)
MCV RBC AUTO: 94 FL (ref 78–100)
MONOCYTES # BLD AUTO: 0.5 10E9/L (ref 0–1.3)
MONOCYTES NFR BLD AUTO: 7.4 %
NEUTROPHILS # BLD AUTO: 5.2 10E9/L (ref 1.6–8.3)
NEUTROPHILS NFR BLD AUTO: 79.1 %
PLATELET # BLD AUTO: 286 10E9/L (ref 150–450)
RBC # BLD AUTO: 2.85 10E12/L (ref 4.4–5.9)
RETICS # AUTO: 29.4 10E9/L (ref 25–95)
RETICS/RBC NFR AUTO: 1 % (ref 0.5–2)
TIBC SERPL-MCNC: 182 UG/DL (ref 240–430)
VIT B12 SERPL-MCNC: 316 PG/ML (ref 193–986)
WBC # BLD AUTO: 6.5 10E9/L (ref 4–11)

## 2019-07-10 PROCEDURE — 85060 BLOOD SMEAR INTERPRETATION: CPT | Performed by: INTERNAL MEDICINE

## 2019-07-10 PROCEDURE — 83540 ASSAY OF IRON: CPT | Performed by: INTERNAL MEDICINE

## 2019-07-10 PROCEDURE — 83615 LACTATE (LD) (LDH) ENZYME: CPT | Performed by: INTERNAL MEDICINE

## 2019-07-10 PROCEDURE — 86334 IMMUNOFIX E-PHORESIS SERUM: CPT | Performed by: INTERNAL MEDICINE

## 2019-07-10 PROCEDURE — 85045 AUTOMATED RETICULOCYTE COUNT: CPT | Performed by: INTERNAL MEDICINE

## 2019-07-10 PROCEDURE — 99000 SPECIMEN HANDLING OFFICE-LAB: CPT | Performed by: INTERNAL MEDICINE

## 2019-07-10 PROCEDURE — 36415 COLL VENOUS BLD VENIPUNCTURE: CPT | Performed by: INTERNAL MEDICINE

## 2019-07-10 PROCEDURE — 82746 ASSAY OF FOLIC ACID SERUM: CPT | Performed by: INTERNAL MEDICINE

## 2019-07-10 PROCEDURE — 82668 ASSAY OF ERYTHROPOIETIN: CPT | Mod: 90 | Performed by: INTERNAL MEDICINE

## 2019-07-10 PROCEDURE — 99213 OFFICE O/P EST LOW 20 MIN: CPT | Performed by: PODIATRIST

## 2019-07-10 PROCEDURE — 86335 IMMUNFIX E-PHORSIS/URINE/CSF: CPT | Performed by: INTERNAL MEDICINE

## 2019-07-10 PROCEDURE — 83550 IRON BINDING TEST: CPT | Performed by: INTERNAL MEDICINE

## 2019-07-10 PROCEDURE — 82784 ASSAY IGA/IGD/IGG/IGM EACH: CPT | Performed by: INTERNAL MEDICINE

## 2019-07-10 PROCEDURE — 82728 ASSAY OF FERRITIN: CPT | Performed by: INTERNAL MEDICINE

## 2019-07-10 PROCEDURE — 82607 VITAMIN B-12: CPT | Performed by: INTERNAL MEDICINE

## 2019-07-10 PROCEDURE — 00000402 ZZHCL STATISTIC TOTAL PROTEIN: Performed by: INTERNAL MEDICINE

## 2019-07-10 PROCEDURE — 84166 PROTEIN E-PHORESIS/URINE/CSF: CPT | Performed by: INTERNAL MEDICINE

## 2019-07-10 PROCEDURE — 84165 PROTEIN E-PHORESIS SERUM: CPT | Performed by: INTERNAL MEDICINE

## 2019-07-10 PROCEDURE — 85025 COMPLETE CBC W/AUTO DIFF WBC: CPT | Performed by: INTERNAL MEDICINE

## 2019-07-10 NOTE — TELEPHONE ENCOUNTER
[facility] Call Center    Phone Message    May a detailed message be left on voicemail: yes    Reason for Call: Other: PT states he's returning a call back to Angelic regarding his blood sugar readings.  Please follow up with the PT.      Action Taken: Message routed to:  [facility] ([facility] JUVENAL
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
## 2019-07-10 NOTE — NURSING NOTE
Amos Walker's chief complaint for this visit includes:  Chief Complaint   Patient presents with     RECHECK     Ulcer, right anterior leg, right fifth metatarsal base x2 / swelling is bothersome / finished clindamycin   some serosanguinous drainage through bandange from dorsal right foot    PCP: Racheal Swift    Referring Provider:  No referring provider defined for this encounter.    /59 (BP Location: Right arm, Patient Position: Sitting, Cuff Size: Adult Large)   Pulse 101   SpO2 100%   Data Unavailable     Do you need any medication refills at today's visit? NO    Maya Vidal CMA

## 2019-07-10 NOTE — TELEPHONE ENCOUNTER
Spoke to patients wife, to have the patient call clinic back. Yoana Villalobos LPN 7/10/2019 11:04 AM
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
## 2019-07-11 ENCOUNTER — TELEPHONE (OUTPATIENT)
Dept: INTERNAL MEDICINE | Facility: CLINIC | Age: 74
End: 2019-07-11

## 2019-07-11 DIAGNOSIS — D64.9 ANEMIA, UNSPECIFIED TYPE: Primary | ICD-10-CM

## 2019-07-11 DIAGNOSIS — I25.10 CORONARY ARTERY DISEASE DUE TO LIPID RICH PLAQUE: ICD-10-CM

## 2019-07-11 DIAGNOSIS — E11.40 TYPE 2 DIABETES, CONTROLLED, WITH NEUROPATHY (H): ICD-10-CM

## 2019-07-11 DIAGNOSIS — I73.9 PVD (PERIPHERAL VASCULAR DISEASE) (H): ICD-10-CM

## 2019-07-11 DIAGNOSIS — I25.83 CORONARY ARTERY DISEASE DUE TO LIPID RICH PLAQUE: ICD-10-CM

## 2019-07-11 LAB — COPATH REPORT: NORMAL

## 2019-07-11 RX ORDER — CLINDAMYCIN HCL 300 MG
300 CAPSULE ORAL 2 TIMES DAILY
Qty: 60 CAPSULE | Refills: 0 | Status: SHIPPED | OUTPATIENT
Start: 2019-07-11 | End: 2019-10-30

## 2019-07-11 NOTE — TELEPHONE ENCOUNTER
Please call patient:  His recent labs again show anemia.  His total iron stores are normal.  I strongly suspect his anemia is related to his chronic medical illness, however, I do not want to over look anything, such as a blood disorder or other sources of blood loss. I would like him to see a Hematologist non-urgently.  I will place a referral.  Please let me know if he has concerns.  Thanks,  Racheal Swift MD  Internal Medicine

## 2019-07-11 NOTE — TELEPHONE ENCOUNTER
Duplicate encounter, encounter already open regarding this. Yoana Villalobos LPN 7/11/2019 9:30 AM

## 2019-07-11 NOTE — TELEPHONE ENCOUNTER
Lonnie Bartlett 17 hours ago (4:28 PM)          [facility]     Phone Message     May a detailed message be left on voicemail: yes     Reason for Call: Other: PT states he's returning a call back to [facility] regarding his blood sugar readings.  Please follow up with the PT.       Action Taken: Message routed to:  [facility] ([facility]

## 2019-07-11 NOTE — TELEPHONE ENCOUNTER
I discussed results and recommendations with Amos, to which he voiced understanding. Amos agrees with the hematology referral and will schedule this appointment.    Amos will increase his Trulicity and instructed today.     Amos does have questions regarding atorvastatin, wondering if he could switch to simvastatin. Amos reports elevated blood sugar, difficulty sleeping, muscle fatigue/weakness, and general fatigue since starting the atorvastatin. He did report that he stopped taking lantus around the time he started atorvastatin, so he understands blood sugar fluctuations could be related to this. He also understands the fatigue could be related to anemia, though he believes this is outside of his baseline. He does believe the insomnia is new.    I will update PCP.    Dafne Bajwa RN

## 2019-07-11 NOTE — TELEPHONE ENCOUNTER
Left message for patient that a [facility] message was sent regarding what we have been trying to touch base with the patient regarding. Yoana Villalobos LPN 7/11/2019 9:38 AM

## 2019-07-12 LAB
ALBUMIN SERPL ELPH-MCNC: 3.4 G/DL (ref 3.7–5.1)
ALPHA1 GLOB SERPL ELPH-MCNC: 0.5 G/DL (ref 0.2–0.4)
ALPHA2 GLOB SERPL ELPH-MCNC: 1.1 G/DL (ref 0.5–0.9)
B-GLOBULIN SERPL ELPH-MCNC: 1.1 G/DL (ref 0.6–1)
EPO SERPL-ACNC: 8 MU/ML (ref 4–27)
GAMMA GLOB SERPL ELPH-MCNC: 2 G/DL (ref 0.7–1.6)
IGA SERPL-MCNC: 604 MG/DL (ref 70–380)
IGG SERPL-MCNC: 1890 MG/DL (ref 695–1620)
IGM SERPL-MCNC: 20 MG/DL (ref 60–265)
M PROTEIN SERPL ELPH-MCNC: 0.2 G/DL
PROT ELPH PNL UR ELPH: NORMAL
PROT PATTERN SERPL ELPH-IMP: ABNORMAL
PROT PATTERN SERPL IFE-IMP: ABNORMAL
PROT PATTERN UR ELPH-IMP: NORMAL

## 2019-07-12 RX ORDER — SIMVASTATIN 40 MG
40 TABLET ORAL AT BEDTIME
Qty: 90 TABLET | Refills: 3 | Status: SHIPPED | OUTPATIENT
Start: 2019-07-12 | End: 2020-07-22

## 2019-07-12 NOTE — TELEPHONE ENCOUNTER
Okay to switch back to simva. I will place order.  I do not want him to change any other diabetes medications yet. If we can't get sugars under control with increased trulicity may need to consider mealtime insulin.  Thanks,  Racheal Swift MD  Internal Medicine

## 2019-07-12 NOTE — TELEPHONE ENCOUNTER
Message left for patient regarding simvastatin prescription. OK to switch back to simvastatin, prescription sent to pharmacy.    Dafne Bajwa RN

## 2019-07-15 DIAGNOSIS — H25.10 SENILE NUCLEAR SCLEROSIS: Primary | ICD-10-CM

## 2019-07-17 ENCOUNTER — OFFICE VISIT (OUTPATIENT)
Dept: PODIATRY | Facility: CLINIC | Age: 74
End: 2019-07-17
Payer: COMMERCIAL

## 2019-07-17 VITALS — HEART RATE: 92 BPM | SYSTOLIC BLOOD PRESSURE: 113 MMHG | DIASTOLIC BLOOD PRESSURE: 59 MMHG | OXYGEN SATURATION: 100 %

## 2019-07-17 DIAGNOSIS — E11.49 TYPE II OR UNSPECIFIED TYPE DIABETES MELLITUS WITH NEUROLOGICAL MANIFESTATIONS, NOT STATED AS UNCONTROLLED(250.60) (H): Primary | ICD-10-CM

## 2019-07-17 DIAGNOSIS — E11.51 DIABETES MELLITUS WITH PERIPHERAL VASCULAR DISEASE (H): ICD-10-CM

## 2019-07-17 DIAGNOSIS — L97.912 ULCER OF RIGHT LOWER EXTREMITY WITH FAT LAYER EXPOSED (H): ICD-10-CM

## 2019-07-17 DIAGNOSIS — L97.512 SKIN ULCER OF RIGHT FOOT WITH FAT LAYER EXPOSED (H): ICD-10-CM

## 2019-07-17 PROCEDURE — 99213 OFFICE O/P EST LOW 20 MIN: CPT | Performed by: PODIATRIST

## 2019-07-17 NOTE — NURSING NOTE
Amos Walker's chief complaint for this visit includes:  Chief Complaint   Patient presents with     RECHECK     Ulcer right anterior leg, ulcer right lateral foot, ulcer with abscess right dorsal foot - taking clindamycin     PCP: Racheal Swift    Referring Provider:  No referring provider defined for this encounter.    /59 (BP Location: Left arm, Patient Position: Sitting, Cuff Size: Adult Regular)   Pulse 92   SpO2 100%   Data Unavailable     Do you need any medication refills at today's visit? Yesenia Vidal CMA
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
## 2019-07-17 NOTE — PATIENT INSTRUCTIONS
Thanks for coming today.  [facility]  [address] [place] [address]    To schedule future appointments in Ortho Clinic, you may call [phone].    To schedule ordered imaging by your provider:   Call [facility] [phone]    To schedule an injection ordered by your provider:  Call [facility] Injection scheduling line: [phone]  [facility] available online at:  [email or web address]    Please call if any further questions or concerns ([phone]).  Clinic hours 8 am to 5 pm.    Return to clinic (call) if symptoms worsen or fail to improve.

## 2019-07-20 DIAGNOSIS — E11.40 TYPE 2 DIABETES, CONTROLLED, WITH NEUROPATHY (H): ICD-10-CM

## 2019-07-23 RX ORDER — GLIPIZIDE 10 MG/1
TABLET ORAL
Qty: 180 TABLET | Refills: 1 | Status: SHIPPED | OUTPATIENT
Start: 2019-07-23 | End: 2019-08-13

## 2019-07-24 ENCOUNTER — ANCILLARY PROCEDURE (OUTPATIENT)
Dept: GENERAL RADIOLOGY | Facility: CLINIC | Age: 74
End: 2019-07-24
Attending: PODIATRIST
Payer: COMMERCIAL

## 2019-07-24 ENCOUNTER — TELEPHONE (OUTPATIENT)
Dept: ORTHOPEDICS | Facility: CLINIC | Age: 74
End: 2019-07-24

## 2019-07-24 ENCOUNTER — OFFICE VISIT (OUTPATIENT)
Dept: PODIATRY | Facility: CLINIC | Age: 74
End: 2019-07-24
Payer: COMMERCIAL

## 2019-07-24 VITALS — DIASTOLIC BLOOD PRESSURE: 53 MMHG | HEART RATE: 96 BPM | OXYGEN SATURATION: 98 % | SYSTOLIC BLOOD PRESSURE: 104 MMHG

## 2019-07-24 DIAGNOSIS — L97.512 SKIN ULCER OF RIGHT FOOT WITH FAT LAYER EXPOSED (H): ICD-10-CM

## 2019-07-24 DIAGNOSIS — L97.912 ULCER OF RIGHT LOWER EXTREMITY WITH FAT LAYER EXPOSED (H): ICD-10-CM

## 2019-07-24 DIAGNOSIS — M14.671 CHARCOT'S JOINT OF FOOT, RIGHT: Primary | ICD-10-CM

## 2019-07-24 DIAGNOSIS — E11.51 DIABETES MELLITUS WITH PERIPHERAL VASCULAR DISEASE (H): ICD-10-CM

## 2019-07-24 DIAGNOSIS — E11.49 TYPE II OR UNSPECIFIED TYPE DIABETES MELLITUS WITH NEUROLOGICAL MANIFESTATIONS, NOT STATED AS UNCONTROLLED(250.60) (H): Primary | ICD-10-CM

## 2019-07-24 PROCEDURE — 73620 X-RAY EXAM OF FOOT: CPT | Mod: RT | Performed by: RADIOLOGY

## 2019-07-24 PROCEDURE — 73610 X-RAY EXAM OF ANKLE: CPT | Mod: RT | Performed by: RADIOLOGY

## 2019-07-24 PROCEDURE — 99213 OFFICE O/P EST LOW 20 MIN: CPT | Performed by: PODIATRIST

## 2019-07-24 NOTE — TELEPHONE ENCOUNTER
Per discussion with Dr. Mcclain, XR of right foot done today shows fractures of the midfoot consistent with Charcot foot. Dr. Mcclain is recommending Pt rest the foot by either using a diabetic CAM boot or cast. Dr. Mcclain is also recommending Pt be non weight bearing if possible through the use of crutches or a knee scooter. The overall goal is to rest the foot an allow the fractures to heal.     Unsure of overall timeline, but fractures typically take 6 weeks or longer to heal in a healthy adult. Dr. Mcclain would be able to discuss at next appt, but for now, he would like him to come in as soon as possible to get the above items.    Called and spoke with Pt's wife (C2C signed) as he was not home yet. Explained everything above. The wife explained, they will come in tomorrow afternoon and will be here by 4:30p.    Will need DME order for knee scooter signed.    Sheng Israel RN
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
## 2019-07-24 NOTE — PATIENT INSTRUCTIONS
Thanks for coming today.  Ortho/Sports Medicine Clinic  [address] [place] [address]    To schedule future appointments in Ortho Clinic, you may call [phone].    To schedule ordered imaging by your Provider: Call [facility] at [phone]    Shoto available online at:   [email or web address]    Please call if any further questions or concerns [phone] and ask for the Orthopedic Department. Clinic hours 8 am to 5 pm.    Return to clinic if symptoms worsen.
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
## 2019-07-24 NOTE — NURSING NOTE
Amos Walker's chief complaint for this visit includes:  Chief Complaint   Patient presents with     RECHECK     recheck left foot     PCP: Racheal Swift    Referring Provider:  No referring provider defined for this encounter.    /53   Pulse 96   SpO2 98%   Data Unavailable     Do you need any medication refills at today's visit? no

## 2019-07-26 NOTE — PROGRESS NOTES
Patient came and was fitted for a diabetic CAM boot, Crutches and Roll a bout scoot prescription given to him.

## 2019-07-29 ENCOUNTER — OFFICE VISIT (OUTPATIENT)
Dept: INTERNAL MEDICINE | Facility: CLINIC | Age: 74
End: 2019-07-29
Payer: COMMERCIAL

## 2019-07-29 VITALS
SYSTOLIC BLOOD PRESSURE: 83 MMHG | OXYGEN SATURATION: 99 % | DIASTOLIC BLOOD PRESSURE: 46 MMHG | TEMPERATURE: 97.9 F | HEART RATE: 92 BPM

## 2019-07-29 DIAGNOSIS — E11.610 CHARCOT'S ARTHROPATHY, DIABETIC (H): Primary | ICD-10-CM

## 2019-07-29 DIAGNOSIS — E11.51 DIABETES MELLITUS WITH PERIPHERAL VASCULAR DISEASE (H): ICD-10-CM

## 2019-07-29 ASSESSMENT — PAIN SCALES - GENERAL: PAINLEVEL: NO PAIN (0)

## 2019-07-29 NOTE — PATIENT INSTRUCTIONS
[facility] Medication Refill Request Information:  * Please contact your pharmacy regarding ANY request for medication refills.  ** [facility] Prescription Fax = [phone]  * Please allow 3 business days for routine medication refills.  * Please allow 5 business days for controlled substance medication refills.     [facility] Test Result notification information:  *You will be notified with in 7-10 days of your appointment day regarding the results of your test.  If you are on MyChart you will be notified as soon as the provider has reviewed the results and signed off on them.    [facility]: [phone]
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
## 2019-07-29 NOTE — NURSING NOTE
Chief Complaint   Patient presents with     Hypertension      pt here for blood pressure       Annel Das CMA at 9:28 AM on 7/29/2019.

## 2019-07-30 ENCOUNTER — TELEPHONE (OUTPATIENT)
Dept: INTERNAL MEDICINE | Facility: CLINIC | Age: 74
End: 2019-07-30

## 2019-07-30 DIAGNOSIS — E11.51 DIABETES MELLITUS WITH PERIPHERAL VASCULAR DISEASE (H): Primary | ICD-10-CM

## 2019-07-31 ENCOUNTER — MYC MEDICAL ADVICE (OUTPATIENT)
Dept: INTERNAL MEDICINE | Facility: CLINIC | Age: 74
End: 2019-07-31

## 2019-07-31 ENCOUNTER — OFFICE VISIT (OUTPATIENT)
Dept: PODIATRY | Facility: CLINIC | Age: 74
End: 2019-07-31
Payer: COMMERCIAL

## 2019-07-31 VITALS
RESPIRATION RATE: 18 BRPM | SYSTOLIC BLOOD PRESSURE: 106 MMHG | HEART RATE: 92 BPM | DIASTOLIC BLOOD PRESSURE: 57 MMHG | TEMPERATURE: 97.3 F | OXYGEN SATURATION: 96 %

## 2019-07-31 DIAGNOSIS — E11.51 DIABETES MELLITUS WITH PERIPHERAL VASCULAR DISEASE (H): ICD-10-CM

## 2019-07-31 DIAGNOSIS — L97.912 ULCER OF RIGHT LOWER EXTREMITY WITH FAT LAYER EXPOSED (H): ICD-10-CM

## 2019-07-31 DIAGNOSIS — L97.512 SKIN ULCER OF RIGHT FOOT WITH FAT LAYER EXPOSED (H): ICD-10-CM

## 2019-07-31 DIAGNOSIS — E11.610 CHARCOT FOOT DUE TO DIABETES MELLITUS (H): ICD-10-CM

## 2019-07-31 DIAGNOSIS — E11.49 TYPE II OR UNSPECIFIED TYPE DIABETES MELLITUS WITH NEUROLOGICAL MANIFESTATIONS, NOT STATED AS UNCONTROLLED(250.60) (H): Primary | ICD-10-CM

## 2019-07-31 PROCEDURE — 99213 OFFICE O/P EST LOW 20 MIN: CPT | Performed by: PODIATRIST

## 2019-07-31 ASSESSMENT — PAIN SCALES - GENERAL: PAINLEVEL: NO PAIN (0)
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
## 2019-07-31 NOTE — NURSING NOTE
Amos Walker's goals for this visit include:   Chief Complaint   Patient presents with     Left Foot - Follow Up     dermagraft     Right Foot - Follow Up     dermagraft       He requests these members of his care team be copied on today's visit information: Yes    PCP: Racheal Swift    Referring Provider:  No referring provider defined for this encounter.    /57 (BP Location: Left arm, Patient Position: Sitting, Cuff Size: Adult Large)   Pulse 92   Temp 97.3  F (36.3  C) (Oral)   Resp 18   SpO2 96%     Do you need any medication refills at today's visit? No    Og Brown CMA (AAMA)
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
## 2019-07-31 NOTE — TELEPHONE ENCOUNTER
Please call patient:  We discussed starting mealtime insulin at his last visit for better glycemic control.  1. I would like him stop: glipizide, januvia and starlix the same day he starts his novolog insulin, ie shouldn't take these oral meds while using insulin.  2. He can continue trulicity for now.  3. Would like him to start taking 3 units of aspart insulin with his meals.  4. He should track sugars 3-4 times daily before each meal and send me his results weekly for adjustments.  5. We will not add back lantus for now.  6. He should schedule with Diabetes Ed for additional guidance.    7. He should see me in 3-4 weeks.  Please have patient repeat back instructions and perhaps send this over [employer] for reference.  Thanks,  Racheal Swift MD  Internal Medicine

## 2019-07-31 NOTE — PATIENT INSTRUCTIONS
Thanks for coming today.  [facility] Medicine Clinic  [address] [place] [address]    To schedule future appointments in Ortho Clinic, you may call [phone].    To schedule ordered imaging by your provider:   Call [facility] [phone]    To schedule an injection ordered by your provider:  Call [facility] Injection scheduling line: [phone]  [facility] available online at:  [email or web address]    Please call if any further questions or concerns ([phone]).  Clinic hours 8 am to 5 pm.    Return to clinic (call) if symptoms worsen or fail to improve.

## 2019-07-31 NOTE — TELEPHONE ENCOUNTER
Pt is not available to answer the phone at this time, so I spoke with pt's wife that he should check mychart message that I sent.

## 2019-08-02 NOTE — TELEPHONE ENCOUNTER
Dr. Swift,    I called the pharmacy and they say Novolog is not covered and the alternative is Humalog.  Do you want to change to Humalog ?    Soon-Mi

## 2019-08-06 ENCOUNTER — TELEPHONE (OUTPATIENT)
Dept: INTERNAL MEDICINE | Facility: CLINIC | Age: 74
End: 2019-08-06

## 2019-08-06 NOTE — TELEPHONE ENCOUNTER
[facility] Call Center    Phone Message    May a detailed message be left on voicemail: yes    Reason for Call: pt calling to get an appointment in august with Dr Winkler- she needs to see him in end of August for diabetes management. She is scheduling in October and that is not good for him.    Action Taken: Message routed to:  [facility] ([facility] Primary care

## 2019-08-06 NOTE — TELEPHONE ENCOUNTER
ONCOLOGY INTAKE: Records Information      APPT INFORMATION:  Referring provider:  Dionne Swift  Referring provider s clinic:  [facility]  Reason for visit/diagnosis:  Anemia  Has patient been notified of appointment date and time?: Per PT    RECORDS INFORMATION:  Were the records received with the referral (via [employer])? No - Internal Referral    Has patient been seen for any external appt for this diagnosis? Per PT, no outside records    ADDITIONAL INFORMATION:  NA

## 2019-08-07 NOTE — TELEPHONE ENCOUNTER
RECORDS STATUS - ALL OTHER DIAGNOSIS      RECORDS RECEIVED FROM: [facility]   DATE RECEIVED: 8/7/19   NOTES STATUS DETAILS   OFFICE NOTE from referring provider Racheal Gomez MD   OFFICE NOTE from medical oncologist     DISCHARGE SUMMARY from hospital     DISCHARGE REPORT from the ER     OPERATIVE REPORT     MEDICATION LIST     CLINICAL TRIAL TREATMENTS TO DATE     LABS     PATHOLOGY REPORTS     ANYTHING RELATED TO DIAGNOSIS Epic 7/11/19   GENONOMIC TESTING     TYPE:     IMAGING (NEED IMAGES & REPORT)     CT SCANS     MRI     MAMMO     ULTRASOUND     PET

## 2019-08-09 ENCOUNTER — ALLIED HEALTH/NURSE VISIT (OUTPATIENT)
Dept: EDUCATION SERVICES | Facility: CLINIC | Age: 74
End: 2019-08-09
Payer: COMMERCIAL

## 2019-08-09 DIAGNOSIS — E11.40 TYPE 2 DIABETES, CONTROLLED, WITH NEUROPATHY (H): Primary | ICD-10-CM

## 2019-08-09 NOTE — PROGRESS NOTES
"Diabetes Self-Management Education & Support    Diabetes Education Self Management & Training    SUBJECTIVE/OBJECTIVE:  Presents for: Individual review  Accompanied by: Spouse  Diabetes education in the past 24mo: No  Focus of Visit: Healthy Eating  Diabetes type: Type 2  Date of diagnosis: 20 years ago  Diabetes management related comments/concerns: review carbohydrate and meal planning  Other concerns:: None  Cultural Influences/Ethnic Background:  American    Diabetes Symptoms & Complications  Weight loss: Yes(currently stable)  Slow healing wounds: Yes(Right foot. Sees a wound specialist - Dr. Mcclain)  Nephropathy: Yes    Patient Problem List and Family Medical History reviewed for relevant medical history, current medical status, and diabetes risk factors.    Vitals:  There were no vitals taken for this visit.  Estimated body mass index is 19.39 kg/m  as calculated from the following:    Height as of 6/24/19: 1.892 m (6' 2.49\").    Weight as of 6/24/19: 69.4 kg (153 lb).   Last 3 BP:   BP Readings from Last 3 Encounters:   07/31/19 106/57   07/29/19 (!) 83/46   07/24/19 104/53       History   Smoking Status     Current Every Day Smoker     Packs/day: 0.50     Years: 50.00     Types: Cigarettes   Smokeless Tobacco     Never Used     Comment: heavier smoker in the past       Labs:  Lab Results   Component Value Date    A1C 6.7 03/27/2019     Lab Results   Component Value Date     05/22/2019     Lab Results   Component Value Date    LDL 49 03/27/2019     HDL Cholesterol   Date Value Ref Range Status   03/27/2019 38 (L) >39 mg/dL Final   ]  GFR Estimate   Date Value Ref Range Status   05/22/2019 57 (L) >60 mL/min/[1.73_m2] Final     Comment:     Non  GFR Calc  Starting 12/18/2018, serum creatinine based estimated GFR (eGFR) will be   calculated using the Chronic Kidney Disease Epidemiology Collaboration   (CKD-EPI) equation.       GFR Estimate If Black   Date Value Ref Range Status "   05/22/2019 66 >60 mL/min/[1.73_m2] Final     Comment:      GFR Calc  Starting 12/18/2018, serum creatinine based estimated GFR (eGFR) will be   calculated using the Chronic Kidney Disease Epidemiology Collaboration   (CKD-EPI) equation.       Lab Results   Component Value Date    CR 1.25 05/22/2019     No results found for: MICROALBUMIN    Healthy Eating  Breakfast: 1/2 cup oatmeal, 2 tbsp peanut butter, 8 ounces milk, fruit - berries(Noon/12:30pm)  Lunch: Skipping lunch - Leftovers - carb, protein, fruit and vegetables(3-4pm)  Dinner: Meridian and cheese loaf, mashed potato, brocolli (6:30/7pm)  Snacks: Triscuits, Glucerna, two snacks daily usually  Other: 6 crackers  Beverages: Coffee, Water    Monitoring         Taking Medications  Diabetes Medication(s)     Dipeptidyl Peptidase-4 (DPP-4) Inhibitors       JANUVIA 100 MG tablet    TAKE 1 TABLET(100 MG) BY MOUTH DAILY    Insulin       insulin lispro (HUMALOG PEN) 100 UNIT/ML pen    Inject 3 Units Subcutaneous 3 times daily (with meals) Check glucose 3-4 times daily before meals    Meglitinide Analogues       nateglinide (STARLIX) 120 MG tablet    Take 1 tablet (120 mg) by mouth 3 times daily (before meals) TAKE 1 TABLET BY MOUTH THREE TIMES DAILY BEFORE MEALS    Sulfonylureas       glipiZIDE (GLUCOTROL) 10 MG tablet    TAKE 1 TABLET(10 MG) BY MOUTH TWICE DAILY BEFORE MEALS    Incretin Mimetic Agents (GLP-1 Receptor Agonists)       dulaglutide (TRULICITY) 1.5 MG/0.5ML pen    Inject 1.5 mg Subcutaneous every 7 days New dose to replace 0.75 mg dose          Current Treatments: Insulin Injections, Non-insulin Injectables  Dose schedule: pre-breakfast, pre-lunch, pre-dinner(3 units )  Given by: Patient    Problem Solving  Hypoglycemia Frequency: Never  Hypoglycemia Treatment: Glucose (tablets or gel)  Patient carries a carbohydrate source: Yes    Healthy Coping     Patient Activation Measure Survey Score:  No flowsheet data found.    ASSESSMENT:  Patient  tells me that he started taking 3 units of humalog before meals on Saturday. Since then, his glucose has improved and he has not had any episodes of hypoglycemia.   Patient and his wife are requesting information regarding carbohydrate and a meal plan for patient.  Patient has lost weight over the past five years and his current BMI is at the lowest end of a normal range.   His estimated needs for weight gain and wound healing are 4160-2578 kcal per day.  A minimum of 258 grams of carbohydrate per day is recommended. And protein with every meal and snack or approximately 89 grams of protein per day.   Patient tells me today that he sleeps late - noon - and then stays up until around 2am. This makes eating more difficult as his wife prepares meals at more common meal times. This means that he will sometimes go too long without eating or will have a snack instead of or skip a meal. Patient and his wife discussed this while in my office today and patient agreed that he will try to get to bed earlier so he can eat at times when his wife is preparing food. A sample flexible schedule we came up with today is 9am breakfast, 12/1pm lunch, 630/7pm dinner and snacks in between.   Patient will occasionally use Glucerna as a snack to help meet needs.    Patient's most recent   Lab Results   Component Value Date    A1C 6.7 03/27/2019    is meeting goal of <8.0    INTERVENTION:   Diabetes knowledge and skills assessment:     Patient is knowledgeable in diabetes management concepts related to: Healthy Eating    Patient needs further education on the following diabetes management concepts: Healthy Eating    Based on learning assessment above, most appropriate setting for further diabetes education would be: Individual setting.    Education provided today on:  AADE Self-Care Behaviors:  Healthy Eating: we reviewed macronutrients in food and function, balancing meals using the plate method, healthy sources of fat to add to diet for  additional calories for weight gain, nutritious sources of carbohydrate, The relationship between carbohydrate and glucose, identifying carbohydrate containing foods, how to use a nutrition facts label  Reviewed A1C and glucose goals from [facility] Protocol and Collaborative practice agreement     Opportunities for ongoing education and support in diabetes-self management were discussed.    Pt verbalized understanding of concepts discussed and recommendations provided today.       Education Materials Provided:  Carbohydrate Counting  Diabetes Exchange List     PLAN:  See Patient Instructions for co-developed, patient-stated behavior change goals.  AVS printed and provided to patient today. See Follow-Up section for recommended follow-up.    6878-6699 calories per day  60-75 grams of carbohydrate at meals   15-30 grams of carbohydrate at snacks   Include a source of protein with meals and snacks  Three meals per day and snacks in between  Continue to use glucerna as needed to help meet needs  Continue to check glucose before meals  Follow up with RN CDE in one week    Time Spent: 60 minutes  Encounter Type: Individual    Any diabetes medication dose changes were made via the CDE Protocol and Collaborative Practice Agreement with the patient's referring provider. A copy of this encounter was shared with the provider.

## 2019-08-12 ENCOUNTER — MYC MEDICAL ADVICE (OUTPATIENT)
Dept: INTERNAL MEDICINE | Facility: CLINIC | Age: 74
End: 2019-08-12

## 2019-08-14 ENCOUNTER — OFFICE VISIT (OUTPATIENT)
Dept: PODIATRY | Facility: CLINIC | Age: 74
End: 2019-08-14
Payer: COMMERCIAL

## 2019-08-14 VITALS — DIASTOLIC BLOOD PRESSURE: 64 MMHG | SYSTOLIC BLOOD PRESSURE: 126 MMHG | HEART RATE: 87 BPM | OXYGEN SATURATION: 99 %

## 2019-08-14 DIAGNOSIS — L97.912 ULCER OF RIGHT LOWER EXTREMITY WITH FAT LAYER EXPOSED (H): ICD-10-CM

## 2019-08-14 DIAGNOSIS — E11.610 CHARCOT FOOT DUE TO DIABETES MELLITUS (H): ICD-10-CM

## 2019-08-14 DIAGNOSIS — E11.49 TYPE II OR UNSPECIFIED TYPE DIABETES MELLITUS WITH NEUROLOGICAL MANIFESTATIONS, NOT STATED AS UNCONTROLLED(250.60) (H): Primary | ICD-10-CM

## 2019-08-14 DIAGNOSIS — E11.51 DIABETES MELLITUS WITH PERIPHERAL VASCULAR DISEASE (H): ICD-10-CM

## 2019-08-14 PROCEDURE — 99213 OFFICE O/P EST LOW 20 MIN: CPT | Performed by: PODIATRIST

## 2019-08-14 NOTE — PATIENT INSTRUCTIONS
Thanks for coming today.  Ortho[facility] Medicine Clinic  [address] [place] [address]    To schedule future appointments in Ortho Clinic, you may call [phone].    To schedule ordered imaging by your Provider: Call [facility] at [phone]    [facility] available online at:   [email or web address]    Please call if any further questions or concerns [phone] and ask for the Orthopedic Department. Clinic hours 8 am to 5 pm.    Return to clinic if symptoms worsen.

## 2019-08-14 NOTE — NURSING NOTE
Amos Walker's chief complaint for this visit includes:  Chief Complaint   Patient presents with     RECHECK     Right leg/ foot ulcer check      PCP: Racheal Swift    Referring Provider:  No referring provider defined for this encounter.    /64   Pulse 87   SpO2 99%   Data Unavailable     Do you need any medication refills at today's visit? no
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
## 2019-08-16 ENCOUNTER — ALLIED HEALTH/NURSE VISIT (OUTPATIENT)
Dept: EDUCATION SERVICES | Facility: CLINIC | Age: 74
End: 2019-08-16
Payer: COMMERCIAL

## 2019-08-16 DIAGNOSIS — E11.40 TYPE 2 DIABETES, CONTROLLED, WITH NEUROPATHY (H): Primary | ICD-10-CM

## 2019-08-16 NOTE — PROGRESS NOTES
"Diabetes Self-Management Education & Support    Diabetes Education Self Management & Training    SUBJECTIVE/OBJECTIVE:  Presents for: Individual review  Accompanied by: Spouse  Diabetes education in the past 24mo: Yes  Focus of Visit: Problem Solving  Diabetes type: Type 2  Disease course: Worsening  Diabetes management related comments/concerns: blood sugar too high, wife concerned that she is feeding him too much carb  Transportation concerns: No  Other concerns:: Glasses  Cultural Influences/Ethnic Background:  American    Diabetes Symptoms & Complications   cataracts, Charcot foot       Patient Problem List and Family Medical History reviewed for relevant medical history, current medical status, and diabetes risk factors.    Vitals:    Estimated body mass index is 19.39 kg/m  as calculated from the following:    Height as of 6/24/19: 1.892 m (6' 2.49\").    Weight as of 6/24/19: 69.4 kg (153 lb).   Last 3 BP:   BP Readings from Last 3 Encounters:   08/14/19 126/64   07/31/19 106/57   07/29/19 (!) 83/46       History   Smoking Status     Current Every Day Smoker     Packs/day: 0.50     Years: 50.00     Types: Cigarettes   Smokeless Tobacco     Never Used     Comment: heavier smoker in the past       Labs:  Lab Results   Component Value Date    A1C 6.7 03/27/2019     Lab Results   Component Value Date     05/22/2019     Lab Results   Component Value Date    LDL 49 03/27/2019     HDL Cholesterol   Date Value Ref Range Status   03/27/2019 38 (L) >39 mg/dL Final   ]  GFR Estimate   Date Value Ref Range Status   05/22/2019 57 (L) >60 mL/min/[1.73_m2] Final     Comment:     Non  GFR Calc  Starting 12/18/2018, serum creatinine based estimated GFR (eGFR) will be   calculated using the Chronic Kidney Disease Epidemiology Collaboration   (CKD-EPI) equation.       GFR Estimate If Black   Date Value Ref Range Status   05/22/2019 66 >60 mL/min/[1.73_m2] Final     Comment:      GFR " Calc  Starting 12/18/2018, serum creatinine based estimated GFR (eGFR) will be   calculated using the Chronic Kidney Disease Epidemiology Collaboration   (CKD-EPI) equation.       Lab Results   Component Value Date    CR 1.25 05/22/2019     No results found for: MICROALBUMIN    Healthy Eating  Wife making 4-5 carb choice meals and feeling like it is too much    Monitoring      Taking Medications  Diabetes Medication(s)     Insulin       insulin lispro (HUMALOG PEN) 100 UNIT/ML pen    Inject 3 Units Subcutaneous 3 times daily (with meals) Check glucose 3-4 times daily before meals    Incretin Mimetic Agents (GLP-1 Receptor Agonists)       dulaglutide (TRULICITY) 1.5 MG/0.5ML pen    Inject 1.5 mg Subcutaneous every 7 days New dose to replace 0.75 mg dose        Healthy Coping     Patient Activation Measure Survey Score:  No flowsheet data found.    ASSESSMENT:  Type 2 diabetes, daily numbers above target    Patient's most recent   Lab Results   Component Value Date    A1C 6.7 03/27/2019    is not meeting goal of <8%    INTERVENTION:     Education provided today on:  The pathophysiology of diabetes was reviewed with Amos and his wife today.  Pictures were used to explain symptoms of hyperglycemia, treatment goals, and provide more insight into how long-term complications develop.  They seemed to understand the material.    Amos's wife feels like he should be writing down everything he eats and mealtimes should be assigned.  She has a cousin with type 1 diabetes who did well using this approach.  We talked about how approaches to diabetes have changed since the 1960's.  While some people do really well with assignments there are others who struggle to stay in routines.      Amos and his wife tell me he ignored his diabetes for years.  He is now at the point where he would like to be told what to do.    Teaching was done on a basal/bolus insulin regimen and the importance of timing when using this. His Lantus has been  stopped recently and it looks like he may benefit from a low dose added back in.  His fasting blood sugars are running 140-189. We could start him at 6 units at bedtime and have him increase until fasting blood sugar is under 130.       Will have him go out and try some of the things we talked about today first and then check back and see what the blood sugars look like before making those changes.    Opportunities for ongoing education and support in diabetes-self management were discussed.    Pt verbalized understanding of concepts discussed and recommendations provided today.         PLAN:  See Patient Instructions for co-developed, patient-stated behavior change goals.  AVS printed and provided to patient today. See Follow-Up section for recommended follow-up.    Time Spent: 60 minutes  Encounter Type: Individual    Any diabetes medication dose changes were made via the CDE Protocol and Collaborative Practice Agreement with the patient's referring provider. A copy of this encounter was shared with the provider.

## 2019-08-16 NOTE — PATIENT INSTRUCTIONS
1.  Test your blood sugar before each meal and at bedtime.  Your blood sugar goals are:  Before meals:  mg/dl     2.  Eat three meals a day and snacks between meals.  Try to keep your carbs at 45-60 grams at a meal.  Try to keep snacks at 15-30 grams.    3.  Keep taking your medication as prescribed and consider adding Lantus back in.    4.  Follow up with your doctor in September and discuss.    Falguni Soto RN,CDE  [address] [address]  Phone: [phone]  [email or web address]            Snack Ideas for your Meal Plan     Protein (1 serving = 6-8 grams of protein each)    Beef Jerky ( 2 pieces)    Beef Sticks, plain (1 stick)    Cheese/Cheese Stick (1 oz)    Chicken breast (1 oz)    Cottage cheese, low fat (1/4 cup)    Crab, Imitation (2 oz)    Deli Meat (2 oz)    Edamame, boiled (1/2 cup)    Egg, hardboiled (1)    Shrimp, small (10 pieces)     Turkey Breast (1 oz)    Tuna, canned in water (1 oz)    Fairlife Milk (1/2 cup)    Yogurt, Greek : Siggis, Oikos Triple Zero, Dannon Light and Fit, etc (6 oz)    Carb (1 carb choice/serving = 15 grams)     Apple (medium)    Applesauce, unsweetened (1/2 cup)    Apricots, dried (4 whole)    Banana, medium (1/2)    Bread, 1 slice     Cantaloupe/Melon (1 cup)    Crackers 4-6 (varies by brand)    Cherries (15)    Cranberries, Dried (2 tbsp)    Dark Chocolate (1 oz)    Dates, dried (2-3 pieces)    Fruit cocktail, in own juice (1/2 cup)    Granola Bar (vary by brand)    Grapes (1/2 cup)    Ice cream, slow churned/low fat (1/2 cup)    Kiwi (~2)    Mixed Berries (1 cup)    Orange (1 medium)    Peach (1 medium)    Pear (1/2 medium)    Plums (2)    Pomegranate (1 small)    Popcorn, stove popped (2 cups)    Pretzels (3/4 oz)    Pudding, sugar free (1/2 cup)    Raisins (2 Tbsp)    Rice Cake, (2)    Skim or 1% milk (1 cup)    Tortilla Chips (1 oz)    Yogurt (greek or plain)   cup    Fat (1 serving = 100 calories)    Almonds (2  Tbsp)    Avocado (1/3 fruit OR 3 Tbsp)    Cashews (11 whole)    Cheese (1 oz)    Chocolate, dark (3/4 oz)    Coconut, unsweetened (1/3 c shredded)    Hummus (3 Tbsp)    Peanuts (20 pieces)    Peanut/Nut Butter (1 Tbsp)    Pecans (10 halves)    Pumpkin seeds, unshelled (2 Tbsp)    Salad dressing  (1-2 Tbsp)    Sunflower seeds (2 Tbsp)    Vegetable Dip (1-2 Tbsp)    Walnuts (8 halves)    Free Foods    Bell Peppers    Broccoli    Carrots     Cauliflower     Celery    Coffee, black (regular or decaf)    Cucumber    Gelatin, Sugar Free    Herbal Tea, unsweetened     Pea Pods    Pickles (high in sodium)     Popsicle, Sugar Free    Salsa (2 Tbsp)    Tomatoes    Combination Snacks    Apple + 1 Tbsp peanut butter (carbohydrate + fat)    Handful crackers + 1 oz cheese (carbohydrate + fat or protein)    Carrot sticks + Hummus (free food + fat)    1 piece of peanut butter toast (carbohydrate + fat)    Greek yogurt + 2 Tbsp sunflower seeds (carbohydrate + fat)    Hard-boiled egg +   cup grapes (protein + carbohydrate)    1 piece of avocado toast (carbohydrate + fat)    Cucumbers + dip (free food + fat)

## 2019-08-22 ENCOUNTER — TELEPHONE (OUTPATIENT)
Dept: EDUCATION SERVICES | Facility: CLINIC | Age: 74
End: 2019-08-22

## 2019-08-28 ENCOUNTER — OFFICE VISIT (OUTPATIENT)
Dept: PODIATRY | Facility: CLINIC | Age: 74
End: 2019-08-28
Payer: COMMERCIAL

## 2019-08-28 ENCOUNTER — PRE VISIT (OUTPATIENT)
Dept: INTERNAL MEDICINE | Facility: CLINIC | Age: 74
End: 2019-08-28

## 2019-08-28 VITALS — HEART RATE: 90 BPM | OXYGEN SATURATION: 99 % | DIASTOLIC BLOOD PRESSURE: 62 MMHG | SYSTOLIC BLOOD PRESSURE: 113 MMHG

## 2019-08-28 DIAGNOSIS — E11.49 TYPE II OR UNSPECIFIED TYPE DIABETES MELLITUS WITH NEUROLOGICAL MANIFESTATIONS, NOT STATED AS UNCONTROLLED(250.60) (H): Primary | ICD-10-CM

## 2019-08-28 DIAGNOSIS — L97.912 ULCER OF RIGHT LOWER EXTREMITY WITH FAT LAYER EXPOSED (H): ICD-10-CM

## 2019-08-28 DIAGNOSIS — E11.51 DIABETES MELLITUS WITH PERIPHERAL VASCULAR DISEASE (H): ICD-10-CM

## 2019-08-28 PROCEDURE — 99213 OFFICE O/P EST LOW 20 MIN: CPT | Performed by: PODIATRIST

## 2019-08-28 RX ORDER — SITAGLIPTIN 100 MG/1
TABLET, FILM COATED ORAL
Refills: 0 | COMMUNITY
Start: 2019-07-05 | End: 2019-09-04

## 2019-08-28 RX ORDER — NATEGLINIDE 120 MG/1
TABLET ORAL
Refills: 1 | COMMUNITY
Start: 2019-07-05 | End: 2019-09-04

## 2019-08-28 RX ORDER — GLIPIZIDE 10 MG/1
TABLET ORAL
Refills: 1 | COMMUNITY
Start: 2019-07-23 | End: 2019-09-04

## 2019-08-28 NOTE — NURSING NOTE
Amos Walker's chief complaint for this visit includes:  Chief Complaint   Patient presents with     RECHECK     Right foot ulcer check      PCP: Racheal Swift    Referring Provider:  No referring provider defined for this encounter.    /62   Pulse 90   SpO2 99%   Data Unavailable     Do you need any medication refills at today's visit? no  '

## 2019-08-28 NOTE — PATIENT INSTRUCTIONS
Thanks for coming today.  Ortho[facility] Medicine Clinic  [address] [place] [address]    To schedule future appointments in Ortho Clinic, you may call [phone].    To schedule ordered imaging by your Provider: Call [facility] at [phone]    [employer] available online at:   [email or web address]    Please call if any further questions or concerns [phone] and ask for the Orthopedic Department. Clinic hours 8 am to 5 pm.    Return to clinic if symptoms worsen.

## 2019-08-28 NOTE — TELEPHONE ENCOUNTER
[facility]  Dept: [phone]     Patient: Amos Walker   : 1945  MRN: [id number]  Encounter: 19       Pre Visit Assessment    Health Maintenance Due   Topic Date Due     ADVANCE CARE PLANNING  1945     ZOSTER IMMUNIZATION (1 of 2) 1997     MEDICARE ANNUAL WELLNESS VISIT  2012     DIABETIC FOOT EXAM  2018     LUNG CANCER SCREENING ANNUAL  2019     Chart Reviewed for Labs needed/Health Maintenance: Yes   If labs needed, orders placed:  Yes Hgb A1c,   Lab appointment scheduled:  No     Notes:  Patient confirmed they will attend appointment:  No  Appointment rescheduled?  N/A      Georgina Henry LPN at 1:56 PM on 2019    Unable to reach patient. Georgina Henry LPN at 2:20 PM on 2019.
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
## 2019-08-31 DIAGNOSIS — E11.9 DIABETES MELLITUS, TYPE II (H): Primary | ICD-10-CM

## 2019-09-03 ENCOUNTER — MYC REFILL (OUTPATIENT)
Dept: INTERNAL MEDICINE | Facility: CLINIC | Age: 74
End: 2019-09-03

## 2019-09-03 ENCOUNTER — MYC MEDICAL ADVICE (OUTPATIENT)
Dept: INTERNAL MEDICINE | Facility: CLINIC | Age: 74
End: 2019-09-03

## 2019-09-03 DIAGNOSIS — E11.51 DIABETES MELLITUS WITH PERIPHERAL VASCULAR DISEASE (H): ICD-10-CM

## 2019-09-03 NOTE — TELEPHONE ENCOUNTER
insulin lispro (HUMALOG PEN) 100 UNIT/ML pen      Last Written Prescription Date:  8-2-19  Last Fill Quantity: 1 ml,   # refills: 1  Last Office Visit : 7-29-19  Future Office visit:  9-4-19    1 ml: 1 RF sent to pharm

## 2019-09-03 NOTE — NURSING NOTE
Jelena Huntley MD  H&P    Patient Care Team:  Kourtney Keller APRN as PCP - General (Nurse Practitioner)  Jose Cruz, MING Restrepo as Nurse Practitioner (Pulmonary Disease)    Chief complaint lung cancer    Subjective     Romel Rosario  is a 73 y.o. male presents with lung cancer needs a port for chemotherapy.  He is on home O2 due to severe COPD.      Review of Systems   Pertinent items are noted in HPI, all other systems reviewed and negative    History  Past Medical History:   Diagnosis Date   • Adenocarcinoma, lung (CMS/HCC)    • Cerumen impaction    • Cervical disc disease    • Colon cancer (CMS/HCC)    • Diabetes (CMS/HCC)    • Elevated cholesterol    • Eustachian tube dysfunction    • GERD (gastroesophageal reflux disease)    • Hx of colonic polyps    • Hypertension    • PUD (peptic ulcer disease)    • Sensorineural hearing loss      Past Surgical History:   Procedure Laterality Date   • BRONCHOSCOPY Left 7/19/2019    Procedure: BRONCHOSCOPY WITH ENDOBRONCHIAL ULTRASOUND AND TRANSBRONCHIAL BIOPSY at 1: 30;  Surgeon: Jere Brumfield MD;  Location: [facility] OR;  Service: Pulmonary   • COLON SURGERY      Due to colon cancer   • COLONOSCOPY N/A 6/26/2017    Procedure: COLONOSCOPY WITH ANESTHESIA;  Surgeon: Barry Banks MD;  Location: [facility] ENDOSCOPY;  Service:    • COLONOSCOPY W/ POLYPECTOMY  08/05/2013    Tubular adenoma transverse x 2, Diverticulosis repeat exam in 3 years   • ENDOSCOPY  08/05/2013    HH, Billroth anastomisis in the gastric antrum   • KNEE SURGERY     • STOMACH SURGERY      Due to ulcer     Family History   Problem Relation Age of Onset   • Heart disease Mother    • Diabetes Mother    • Alzheimer's disease Father    • Colon cancer Neg Hx    • Colon polyps Neg Hx      Social History     Tobacco Use   • Smoking status: Former Smoker   • Smokeless tobacco: Never Used   Substance Use Topics   • Alcohol use: No   • Drug use: No     Medications Prior to Admission   Medication Sig  "Amos Walker's goals for this visit include: Recheck right loeg ulcer  He requests these members of his care team be copied on today's visit information: yes    PCP: Racheal Swift    Referring Provider:  Referred Self, MD  No address on file    Chief Complaint   Patient presents with     RECHECK     Right leg ulcer       Initial /66  Pulse 107  SpO2 98% Estimated body mass index is 21.68 kg/(m^2) as calculated from the following:    Height as of 1/22/18: 1.892 m (6' 2.5\").    Weight as of 2/21/18: 77.6 kg (171 lb 2 oz).  Medication Reconciliation: complete    " Dispense Refill Last Dose   • albuterol sulfate  (90 Base) MCG/ACT inhaler Inhale 2 puffs Every 4 (Four) Hours As Needed for Wheezing.   9/2/2019 at 1900   • ALPRAZolam (XANAX) 1 MG tablet Take 1 tablet by mouth Every 6 (Six) Hours As Needed for Anxiety (Agitation).   9/2/2019 at 1900   • atorvastatin (LIPITOR) 40 MG tablet Take 40 mg by mouth Daily.   9/2/2019 at 0700   • budesonide-formoterol (SYMBICORT) 160-4.5 MCG/ACT inhaler Inhale 2 puffs 2 (Two) Times a Day.  12 9/2/2019 at 1900   • Ergocalciferol (VITAMIN D2) 2000 units tablet Take 1 tablet by mouth Daily.   9/2/2019 at Unknown time   • esomeprazole (nexIUM) 40 MG capsule Take 40 mg by mouth Every Morning Before Breakfast.   9/2/2019 at 0700   • furosemide (LASIX) 20 MG tablet Take 20 mg by mouth Daily.   9/2/2019 at 0700   • JANUVIA 50 MG tablet Take 50 mg by mouth Daily.   9/2/2019 at 0700   • losartan (COZAAR) 100 MG tablet Take 1 tablet by mouth Daily.   9/2/2019 at 0700   • acetaminophen (TYLENOL) 325 MG tablet Take 2 tablets by mouth Every 4 (Four) Hours As Needed for Mild Pain .   More than a month at Unknown time   • hydrOXYzine (ATARAX) 10 MG tablet Take 10 mg by mouth 4 (Four) Times a Day As Needed for Anxiety.   7/17/2019 at 0600     Allergies:  Lactose intolerance (gi)    Objective     Vital Signs  Temp:  [98 °F (36.7 °C)] 98 °F (36.7 °C)  Heart Rate:  [60-72] 60  Resp:  [16-18] 18  BP: (123)/(61) 123/61    Physical Exam:      General Appearance:    Alert, cooperative, in no acute distress   Head:    Normocephalic, without obvious abnormality, atraumatic   Eyes:            Lids and lashes normal, conjunctivae and sclerae normal, no   icterus, no pallor, corneas clear, PERRLA   Ears:    Ears appear intact with no abnormalities noted   Neck:   No adenopathy, supple, trachea midline   Back:     No kyphosis present, no scoliosis present, no skin lesions,      erythema or scars, no tenderness to percussion or                   palpation,    range of motion normal   Lungs:     Clear to auscultation,respirations regular, even and                  unlabored    Heart:    Regular rhythm and normal rate, normal S1 and S2, no            murmur, no gallop, no rub, no click   Chest Wall:    No abnormalities observed   Abdomen:    Soft   Rectal:     Deferred   Extremities:   Moves all extremities well, no edema, no cyanosis, no             redness   Pulses:   Pulses palpable and equal bilaterally   Skin:   No bleeding, bruising or rash   Lymph nodes:   No palpable adenopathy   Neurologic:   No focal deficits       Results Review:      Lab Results (last 72 hours)     Procedure Component Value Units Date/Time    POC Glucose Once [[id number]]  (Normal) Collected:  09/03/19 1321    Specimen:  Blood Updated:  09/03/19 1337     Glucose 115 mg/dL      Comment: : [id number] Bharat LesterNinaluciano ID: [id number]           Imaging Results (last 72 hours)     ** No results found for the last 72 hours. **          Assessment/Plan       * No active hospital problems. *      We will proceed with port placement.  The risks of bleeding infection injury to the artery lung or other structures were discussed      Jelena Huntley MD  09/03/19  2:52 PM

## 2019-09-04 ENCOUNTER — OFFICE VISIT (OUTPATIENT)
Dept: INTERNAL MEDICINE | Facility: CLINIC | Age: 74
End: 2019-09-04
Payer: COMMERCIAL

## 2019-09-04 VITALS
DIASTOLIC BLOOD PRESSURE: 71 MMHG | BODY MASS INDEX: 19.64 KG/M2 | HEART RATE: 55 BPM | SYSTOLIC BLOOD PRESSURE: 148 MMHG | HEIGHT: 74 IN

## 2019-09-04 DIAGNOSIS — E11.40 TYPE 2 DIABETES, CONTROLLED, WITH NEUROPATHY (H): ICD-10-CM

## 2019-09-04 DIAGNOSIS — M14.60 CHARCOT'S JOINT: Primary | ICD-10-CM

## 2019-09-04 DIAGNOSIS — L97.518 DIABETIC ULCER OF RIGHT FOOT ASSOCIATED WITH TYPE 2 DIABETES MELLITUS, WITH OTHER ULCER SEVERITY, UNSPECIFIED PART OF FOOT (H): ICD-10-CM

## 2019-09-04 DIAGNOSIS — H26.9 CATARACT, UNSPECIFIED CATARACT TYPE, UNSPECIFIED LATERALITY: ICD-10-CM

## 2019-09-04 DIAGNOSIS — E11.51 DIABETES MELLITUS WITH PERIPHERAL VASCULAR DISEASE (H): ICD-10-CM

## 2019-09-04 DIAGNOSIS — E11.621 DIABETIC ULCER OF RIGHT FOOT ASSOCIATED WITH TYPE 2 DIABETES MELLITUS, WITH OTHER ULCER SEVERITY, UNSPECIFIED PART OF FOOT (H): ICD-10-CM

## 2019-09-04 RX ORDER — LANCETS 28 GAUGE
EACH MISCELLANEOUS
Qty: 100 EACH | Refills: 11 | Status: SHIPPED | OUTPATIENT
Start: 2019-09-04 | End: 2023-04-19

## 2019-09-04 ASSESSMENT — PAIN SCALES - GENERAL: PAINLEVEL: NO PAIN (0)
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
## 2019-09-04 NOTE — PATIENT INSTRUCTIONS
Restart lantus at 4 units. Goal fasting morning sugar (before breakfast) is 100-130.  May increase lantus by 2 units every day until goal sugar is achieved.  Please stop or lower dose if any morning sugars less than 90 occur.    Keep humalog 4 units with Breakfast, 3 units with Lunch and 3 units with Dinner.    Check sugars 3-4 times daily, at least every morning before breakfast and then 2 hours after a meal.  If you cannot check 2 hours after a meal, before the next meal is a suitable alternative.

## 2019-09-04 NOTE — NURSING NOTE
"Chief Complaint   Patient presents with     Diabetes     diabetic follow up, review labs       Blood pressure (!) 148/71, pulse 55, height 1.88 m (6' 2\"). Body mass index is 19.64 kg/m .    Patient Active Problem List   Diagnosis     Senile nuclear sclerosis     PVD (peripheral vascular disease) (H)     HTN (hypertension)     CKD (chronic kidney disease) stage 3, GFR 30-59 ml/min (H)     Type 2 diabetes, controlled, with neuropathy (H)     Diabetes mellitus with peripheral vascular disease (H)     Fracture of neck of femur (H)     Aftercare following joint replacement [Z47.1]     Long-term (current) use of anticoagulants [Z79.01]     Status post left heart catheterization     Status post coronary angiogram     Critical lower limb ischemia     Non-healing ulcer (H)     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)     Atherosclerosis of native arteries of right leg with ulceration of other part of foot (H)       Allergies   Allergen Reactions     Lisinopril Other (See Comments)     dizziness     Neomycin Other (See Comments)     Wound gets worse     Methylchloroisothiazolinone [Methylisothiazolinone] Rash     Povidone Iodine Rash       Current Outpatient Medications   Medication Sig Dispense Refill     ammonium lactate (LAC-HYDRIN) 12 % cream Apply topically 2 times daily as needed for dry skin 385 g 3     ascorbic acid 500 MG TABS Take 1 tablet (500 mg) by mouth 2 times daily 30 tablet      ASPIRIN PO Take 81 mg by mouth daily       blood glucose (ONETOUCH ULTRA) test strip Use twice daily as directed 200 strip 3     blood glucose monitoring (FREESTYLE) lancets Use to test blood sugars 2 as directed. 3 each 3     Blood Pressure KIT 1 Device daily 1 kit 0     clindamycin (CLEOCIN) 300 MG capsule Take 1 capsule (300 mg) by mouth 2 times daily 60 capsule 0     clopidogrel (PLAVIX) 75 MG tablet Take 1 tablet (75 mg) by mouth daily (Patient taking differently: Take 75 mg by mouth every " "evening ) 30 tablet 11     dulaglutide (TRULICITY) 1.5 MG/0.5ML pen Inject 1.5 mg Subcutaneous every 7 days New dose to replace 0.75 mg dose 2 mL 2     ferrous sulfate (IRON) 325 (65 FE) MG tablet Take 1 tablet (325 mg) by mouth 2 times daily 60 tablet 11     Gauze Pads & Dressings (OPTIFOAM) 6\"X6\" PADS 1 Box once a week 1 each 6     gentamicin (GARAMYCIN) 0.1 % external cream Apply to left foot and leg ulcers. 30 g 5     glipiZIDE (GLUCOTROL) 10 MG tablet   1     insulin lispro (HUMALOG PEN) 100 UNIT/ML pen Inject 3 Units Subcutaneous 3 times daily (with meals) Check glucose 3-4 times daily before meals 1 mL 1     insulin pen needle (B-D U/F) 31G X 8 MM miscellaneous USE ONCE DAILY AS DIRECTED 100 each 11     JANUVIA 100 MG tablet   0     ketoconazole (NIZORAL) 2 % external shampoo Apply topically twice a week Leave on for 3-5 min then rinse off; after 2-4 weeks use only once weekly 120 mL 3     lisinopril (PRINIVIL/ZESTRIL) 20 MG tablet Take 0.5 tablets (10 mg) by mouth daily 90 tablet 3     nateglinide (STARLIX) 120 MG tablet   1     ONETOUCH ULTRA test strip TEST TWICE DAILY AS DIRECTED 200 strip 3     order for DME Equipment being ordered: Roll a bout knee scooter 1 Device 0     order for DME Please measure and distribute 1 pair of 30mmHg - 40mmHg knee high open toe ulcercare compression stockings. Jobst ultrasheer or equivalent. 2 each 6     polyethylene glycol (MIRALAX/GLYCOLAX) powder Take 17 g (1 capful) by mouth daily 255 g 1     silver sulfADIAZINE (SILVADENE) 1 % external cream Apply topically daily To affected areas on right foot and leg. 85 g 5     simvastatin (ZOCOR) 40 MG tablet Take 1 tablet (40 mg) by mouth At Bedtime 90 tablet 3     triamcinolone (KENALOG) 0.1 % external cream Apply sparingly to left heel daily. 60 g 1     VITAMIN D, CHOLECALCIFEROL, PO Take 1,000 Units by mouth 2 times daily         Social History     Tobacco Use     Smoking status: Current Every Day Smoker     Packs/day: 0.50    "  Years: 50.00     Pack years: 25.00     Types: Cigarettes     Smokeless tobacco: Never Used     Tobacco comment: heavier smoker in the past   Substance Use Topics     Alcohol use: No     Drug use: No       Kayla Varma LPN, LPN  9/4/2019  11:29 AM

## 2019-09-10 ENCOUNTER — OFFICE VISIT (OUTPATIENT)
Dept: OPHTHALMOLOGY | Facility: CLINIC | Age: 74
End: 2019-09-10
Attending: OPHTHALMOLOGY
Payer: COMMERCIAL

## 2019-09-10 DIAGNOSIS — H04.123 DRY EYES, BILATERAL: ICD-10-CM

## 2019-09-10 DIAGNOSIS — H52.13 MYOPIA, BILATERAL: ICD-10-CM

## 2019-09-10 DIAGNOSIS — E11.3293 MILD NONPROLIFERATIVE DIABETIC RETINOPATHY OF BOTH EYES WITHOUT MACULAR EDEMA ASSOCIATED WITH TYPE 2 DIABETES MELLITUS (H): ICD-10-CM

## 2019-09-10 DIAGNOSIS — H25.813 COMBINED FORM OF AGE-RELATED CATARACT, BOTH EYES: Primary | ICD-10-CM

## 2019-09-10 PROCEDURE — 76519 ECHO EXAM OF EYE: CPT | Mod: ZF | Performed by: OPHTHALMOLOGY

## 2019-09-10 PROCEDURE — G0463 HOSPITAL OUTPT CLINIC VISIT: HCPCS | Mod: ZF

## 2019-09-10 ASSESSMENT — VISUAL ACUITY
CORRECTION_TYPE: GLASSES
OS_PH_CC: 20/80
OD_CC+: -2
OS_CC: 20/200
OD_CC: 20/40
METHOD: SNELLEN - LINEAR

## 2019-09-10 ASSESSMENT — CUP TO DISC RATIO
OS_RATIO: 0.35
OD_RATIO: 0.35

## 2019-09-10 ASSESSMENT — REFRACTION_WEARINGRX
OD_SPHERE: -2.00
OD_CYLINDER: +3.25
OS_AXIS: 008
OS_SPHERE: -1.50
OS_CYLINDER: +1.25
OD_AXIS: 170

## 2019-09-10 ASSESSMENT — TONOMETRY
OS_IOP_MMHG: 22
OD_IOP_MMHG: 23
IOP_METHOD: TONOPEN

## 2019-09-10 ASSESSMENT — SLIT LAMP EXAM - LIDS
COMMENTS: NORMAL
COMMENTS: NORMAL

## 2019-09-10 ASSESSMENT — CONF VISUAL FIELD
OD_NORMAL: 1
OS_NORMAL: 1
METHOD: COUNTING FINGERS

## 2019-09-10 ASSESSMENT — EXTERNAL EXAM - RIGHT EYE: OD_EXAM: NORMAL

## 2019-09-10 ASSESSMENT — EXTERNAL EXAM - LEFT EYE: OS_EXAM: NORMAL

## 2019-09-10 NOTE — PROGRESS NOTES
"  Chief Complaint(s) and History of Present Illness(es)     Consult For     Laterality: both eyes    Onset: gradual    Onset: months ago    Quality: flim or haze over the LE    Frequency: constantly    Associated symptoms: Negative for dryness, redness and tearing    Pain scale: 0/10              Comments       Lab Results       Component                Value               Date                       A1C                      6.7                 03/27/2019                 A1C                      7.2                 11/16/2018                 A1C                      6.6                 06/21/2018                 A1C                      5.8                 04/27/2018                 A1C                      6.5                 10/25/2017            Julia Bowling COT 1:04 PM September 10, 2019     He has had a haze in his left eye for the past 2 weeks, steady since then, maybe a little worse in the morning, \"really irritating\" because he needs to squint to read.         Review of systems for the eyes was negative other than the pertinent positives/negatives listed in the HPI.      Assessment & Plan      Amos Walker is a 74 year old male with the following diagnoses:   1. Combined form of age-related cataract, both eyes    2. Mild nonproliferative diabetic retinopathy of both eyes without macular edema associated with type 2 diabetes mellitus (H)    3. Myopia, bilateral - Both Eyes    4. Dry eyes, bilateral         # Cataracts OU  Cataract, both eye  Visually significant, left eye > right eye   Risks, benefits and alternatives to cataract extraction/IOL implantation discussed; consent obtained.  Will schedule surgery today    Special equipment/needs:    Anesthesia:Topical  Dilation:Good  Iris expansion:Not needed  Pseudoexfoliation: No pseudoexfoliation  Trypan Blue: No  Left eye first (dex)  Need to improve dry eye syndrome prior to surgery   Repeat calcs 1 week prior to surgery   Artificial tears 4-6x " daily   Warm compresses 2-3x daily      Diabetes mellitus with mild nonproliferative diabetic retinopathy  - Blood pressure (<120/80) and blood glucose (HbA1c <7.0) control discussed with patient. Patient advised that failure to adequately control each may lead to vision loss. The patient expressed understanding.  - OCT without macular edema  - Followed by Dr. Aranda     Refractive Error  - BCVA with updated MRx 20/30 OS and 20/40 OD  - new Rx provided per pt's request      Drusen BE  - not AMD, mild      Patient disposition:   Return in about 4 weeks (around 10/8/2019) for Vision only, IOL calcs (1 week prior to surgery).    Rickie Dunham MD  Ophthalmology PGY-2  [facility]  Pager: [phone]      Attending Physician Attestation:  Complete documentation of historical and exam elements from today's encounter can be found in the full encounter summary report (not reduplicated in this progress note).  I personally obtained the chief complaint(s) and history of present illness.  I confirmed and edited as necessary the review of systems, past medical/surgical history, family history, social history, and examination findings as documented by others; and I examined the patient myself.  I personally reviewed the relevant tests, images, and reports as documented above.  I formulated and edited as necessary the assessment and plan and discussed the findings and management plan with the patient and family. . - Dominic Purdy MD

## 2019-09-10 NOTE — NURSING NOTE
Chief Complaints and History of Present Illnesses   Patient presents with     Consult For     Chief Complaint(s) and History of Present Illness(es)     Consult For     Laterality: both eyes    Onset: gradual    Onset: months ago    Quality: flim or haze over the LE    Frequency: constantly    Associated symptoms: Negative for dryness, redness and tearing    Pain scale: 0/10              Comments       Lab Results       Component                Value               Date                       A1C                      6.7                 03/27/2019                 A1C                      7.2                 11/16/2018                 A1C                      6.6                 06/21/2018                 A1C                      5.8                 04/27/2018                 A1C                      6.5                 10/25/2017            Julia Bowling COT 1:04 PM September 10, 2019

## 2019-09-11 ENCOUNTER — OFFICE VISIT (OUTPATIENT)
Dept: PODIATRY | Facility: CLINIC | Age: 74
End: 2019-09-11
Payer: COMMERCIAL

## 2019-09-11 VITALS — SYSTOLIC BLOOD PRESSURE: 139 MMHG | OXYGEN SATURATION: 100 % | HEART RATE: 48 BPM | DIASTOLIC BLOOD PRESSURE: 59 MMHG

## 2019-09-11 DIAGNOSIS — E11.49 TYPE II OR UNSPECIFIED TYPE DIABETES MELLITUS WITH NEUROLOGICAL MANIFESTATIONS, NOT STATED AS UNCONTROLLED(250.60) (H): Primary | ICD-10-CM

## 2019-09-11 DIAGNOSIS — L97.911 ULCER OF RIGHT LOWER EXTREMITY, LIMITED TO BREAKDOWN OF SKIN (H): ICD-10-CM

## 2019-09-11 DIAGNOSIS — E11.610 CHARCOT FOOT DUE TO DIABETES MELLITUS (H): ICD-10-CM

## 2019-09-11 DIAGNOSIS — E11.51 DIABETES MELLITUS WITH PERIPHERAL VASCULAR DISEASE (H): ICD-10-CM

## 2019-09-11 DIAGNOSIS — I87.8 VENOUS STASIS: ICD-10-CM

## 2019-09-11 PROCEDURE — 99213 OFFICE O/P EST LOW 20 MIN: CPT | Performed by: PODIATRIST

## 2019-09-11 NOTE — PATIENT INSTRUCTIONS
Thanks for coming today.  [facility]  [address] [place] [address]    To schedule future appointments in Ortho Clinic, you may call [phone].    To schedule ordered imaging by your Provider: Call [facility] at [phone]    textPlus available online at:   [email or web address]    Please call if any further questions or concerns [phone] and ask for the Orthopedic Department. Clinic hours 8 am to 5 pm.    Return to clinic if symptoms worsen.

## 2019-09-11 NOTE — NURSING NOTE
Amos Walker's chief complaint for this visit includes:  Chief Complaint   Patient presents with     RECHECK     Right foot check      PCP: Racheal Swift    Referring Provider:  No referring provider defined for this encounter.    /59   Pulse (!) 48   SpO2 100%   Data Unavailable     Do you need any medication refills at today's visit? no
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
## 2019-09-25 ENCOUNTER — MYC MEDICAL ADVICE (OUTPATIENT)
Dept: INTERNAL MEDICINE | Facility: CLINIC | Age: 74
End: 2019-09-25

## 2019-09-25 ENCOUNTER — MYC REFILL (OUTPATIENT)
Dept: INTERNAL MEDICINE | Facility: CLINIC | Age: 74
End: 2019-09-25

## 2019-09-25 ENCOUNTER — OFFICE VISIT (OUTPATIENT)
Dept: PODIATRY | Facility: CLINIC | Age: 74
End: 2019-09-25
Payer: COMMERCIAL

## 2019-09-25 VITALS — SYSTOLIC BLOOD PRESSURE: 124 MMHG | OXYGEN SATURATION: 100 % | DIASTOLIC BLOOD PRESSURE: 52 MMHG | HEART RATE: 63 BPM

## 2019-09-25 DIAGNOSIS — E11.610 CHARCOT FOOT DUE TO DIABETES MELLITUS (H): ICD-10-CM

## 2019-09-25 DIAGNOSIS — I87.8 VENOUS STASIS: ICD-10-CM

## 2019-09-25 DIAGNOSIS — E11.51 DIABETES MELLITUS WITH PERIPHERAL VASCULAR DISEASE (H): ICD-10-CM

## 2019-09-25 DIAGNOSIS — E11.49 TYPE II OR UNSPECIFIED TYPE DIABETES MELLITUS WITH NEUROLOGICAL MANIFESTATIONS, NOT STATED AS UNCONTROLLED(250.60) (H): Primary | ICD-10-CM

## 2019-09-25 PROCEDURE — 99213 OFFICE O/P EST LOW 20 MIN: CPT | Performed by: PODIATRIST
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
## 2019-09-25 NOTE — NURSING NOTE
Amos Walker's chief complaint for this visit includes:  No chief complaint on file.    PCP: Racheal Swift    Referring Provider:  No referring provider defined for this encounter.    /52 (BP Location: Right arm, Patient Position: Sitting, Cuff Size: Adult Regular)   Pulse 63   SpO2 100%   Data Unavailable     Do you need any medication refills at today's visit? Yesenia Vidal CMA

## 2019-09-25 NOTE — PATIENT INSTRUCTIONS
Thanks for coming today.  [facility]  [address] [place] [address]    To schedule future appointments in Ortho Clinic, you may call [phone].    To schedule ordered imaging by your provider:   Call [facility] [phone]    To schedule an injection ordered by your provider:  Call [facility] Injection scheduling line: [phone]  [facility] available online at:  [email or web address]    Please call if any further questions or concerns ([phone]).  Clinic hours 8 am to 5 pm.    Return to clinic (call) if symptoms worsen or fail to improve.
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
## 2019-09-26 NOTE — TELEPHONE ENCOUNTER
*insulin lispro (HUMALOG PEN) 100 UNIT/ML pen      Last Written Prescription Date:  9-4-19  Last Fill Quantity: 3 ml,   # refills: 3  Duplicate: refill denied

## 2019-10-04 ENCOUNTER — OFFICE VISIT (OUTPATIENT)
Dept: INTERNAL MEDICINE | Facility: CLINIC | Age: 74
End: 2019-10-04
Payer: COMMERCIAL

## 2019-10-04 VITALS
DIASTOLIC BLOOD PRESSURE: 62 MMHG | WEIGHT: 153 LBS | OXYGEN SATURATION: 95 % | HEART RATE: 97 BPM | SYSTOLIC BLOOD PRESSURE: 122 MMHG | BODY MASS INDEX: 19.64 KG/M2

## 2019-10-04 DIAGNOSIS — Z79.4 TYPE 2 DIABETES MELLITUS WITH DIABETIC PERIPHERAL ANGIOPATHY WITHOUT GANGRENE, WITH LONG-TERM CURRENT USE OF INSULIN (H): ICD-10-CM

## 2019-10-04 DIAGNOSIS — I10 ESSENTIAL HYPERTENSION: ICD-10-CM

## 2019-10-04 DIAGNOSIS — I49.8 OTHER CARDIAC ARRHYTHMIA: ICD-10-CM

## 2019-10-04 DIAGNOSIS — H25.9 AGE-RELATED CATARACT OF BOTH EYES, UNSPECIFIED AGE-RELATED CATARACT TYPE: Primary | ICD-10-CM

## 2019-10-04 DIAGNOSIS — I73.9 PERIPHERAL VASCULAR DISEASE, UNSPECIFIED (H): ICD-10-CM

## 2019-10-04 DIAGNOSIS — E11.51 TYPE 2 DIABETES MELLITUS WITH DIABETIC PERIPHERAL ANGIOPATHY WITHOUT GANGRENE, WITH LONG-TERM CURRENT USE OF INSULIN (H): ICD-10-CM

## 2019-10-04 DIAGNOSIS — Z12.2 ENCOUNTER FOR SCREENING FOR LUNG CANCER: ICD-10-CM

## 2019-10-04 DIAGNOSIS — Z23 NEED FOR INFLUENZA VACCINATION: ICD-10-CM

## 2019-10-04 DIAGNOSIS — L98.493: ICD-10-CM

## 2019-10-04 DIAGNOSIS — E11.610 CHARCOT'S ARTHROPATHY, DIABETIC (H): ICD-10-CM

## 2019-10-04 LAB — HBA1C MFR BLD: 5.6 % (ref 0–5.6)

## 2019-10-04 RX ORDER — CLOPIDOGREL BISULFATE 75 MG/1
75 TABLET ORAL EVERY EVENING
Qty: 90 TABLET | Refills: 3 | Status: SHIPPED | OUTPATIENT
Start: 2019-10-04 | End: 2020-10-22

## 2019-10-04 RX ORDER — FLASH GLUCOSE SENSOR
KIT MISCELLANEOUS
Qty: 1 EACH | Refills: 0 | Status: SHIPPED | OUTPATIENT
Start: 2019-10-04 | End: 2019-10-07

## 2019-10-04 RX ORDER — FLASH GLUCOSE SCANNING READER
1 EACH MISCELLANEOUS
Qty: 1 DEVICE | Refills: 0 | Status: SHIPPED | OUTPATIENT
Start: 2019-10-04

## 2019-10-04 ASSESSMENT — PAIN SCALES - GENERAL
PAINLEVEL: NO PAIN (0)
PAINLEVEL: NO PAIN (0)

## 2019-10-04 NOTE — PROGRESS NOTES
Routine Health Maintenance  Immunizations (zoster, pneumovax, flu, Tdap, Hep A/B):           Most Recent Immunizations   Administered Date(s) Administered     Influenza (High Dose) 3 valent vaccine 11/05/2018     Influenza (IIV3) PF 11/01/2013     Pneumo Conj 13-V (2010&after) 11/03/2014     Pneumococcal 23 valent 12/21/2015     TDAP Vaccine (Boostrix) 03/21/2016      Lipids:               Recent Labs   Lab Test 03/27/19  0934 11/16/18  0915   11/18/14  0853   CHOL 95 100   < > 110   HDL 38* 48   < > 45   LDL 49 42   < > 45   TRIG 40 50   < > 100   CHOLHDLRATIO  --   --   --  2.4    < > = values in this interval not displayed.      PSA (50-75 yrs): No results found for: PSA   AAA Screening (65-75 yrs): CT 12/17, negative  Lung Ca Screening (>30 py 55-79 or >20 py 50-79 + RF): 5/18 5x3 mm nodule  Colonoscopy (50-75 yrs): 4/18, recommend repeat when off plavix, pending for June and he will hold plavix x 5 days  HIV/HCV if risk factors: nonreactive HepC 6/16  Safety/Lifestyle: reviewed  Tob/EtOH: declines quitting  Depression:   PHQ-2 Score:      PHQ-2 ( 1999 Pfizer) 11/20/2018 9/18/2018   Q1: Little interest or pleasure in doing things 0 0   Q2: Feeling down, depressed or hopeless 0 0   PHQ-2 Score 0 0      Advanced Directive: deferred

## 2019-10-04 NOTE — PATIENT INSTRUCTIONS
"      You can check out videos on YouTube about \"Diabetic Angi\" for instructions on setting up the continuous glucose monitor.      Do not take lantus or humalog prior to cataract surgery.  "

## 2019-10-04 NOTE — LETTER
10/4/2019      RE: Amos Walker  [address] [place] [address]       [facility] Center   Racheal Swift MD  10/04/2019     Chief Complaint:   Pre-Op Exam        History of Present Illness:   Amos Walker is 74 year old male here at the request of Dr. Purdy for cardiovascular, pulmonary, and perioperative risk assessment prior to surgery.  The intended surgical procedure is cataract extraction and intraocular lens implantation.  A copy of this note will be sent to the surgeon.     Reason for surgery:  He has cataracts in both eyes. He has been experiencing a film and cloudiness over his eyes.     Cardiovascular Risk:  This patient is currently in a wheelchair. He does have a history of heart disease.  There is a history of coronary artery disease.    Pulmonary Risk:  In terms of risk factors for pulmonary complications, the patient does have a history of Emphysema    Perioperative Complications:  The patient does not have a history of bleeding or clotting problems in the past, specifically has no history of DVT, PE or bleeding disorder.  They are currently anticoagulated on Plavix 75 mg daily.  He has not had complications from past surgeries.  The patient does not have a family history of any anesthesia or surgical complications.  The patient has been told to not take any aspirin or NSAIDs for 10 days prior to surgery. The patient has been instructed as to what to do with medications prior to surgery.    Other concerns:  Type 2 diabetes mellitus: His A1C today was 5.6. He sent a list of blood sugar readings via kidthing. 2 hours after lunch yesterday, his blood glucose was 87. At this time, he felt shaky. After dinner, it was 206. Depending on what he eats, he has to be conscious of his blood sugar after dinner. He reports that it is usually over 105 in the mornings. The diabetic educator suggested that he look into the Freestyle Danny test sensors. He has researched this online, and  thinks that it would work well for him. He is happy with his management, but needs to make sure that his high and low values are not too extreme. He currently takes 6 units of Lantus daily, 4 units of Humalog daily with breakfast and 3 units of Humalog daily with lunch and dinner, along with Trulicity 1.5 mg once weekly. He would like to gain weight, but has not been able to. He eats healthy meals prepared by his wife.     Hematology: He canceled his hematology appointment with Dr. Nielsen because of his upcoming cataract surgery.     Charcot Foot: He has a foot x-ray next week. His wounds are scabbing over and the swelling has decreased. He put Hydrofera Blue on one of the scabs that is beginning to come off and covered it with a bandage. His podiatry appointments have been helpful. He has had some irritation with the boot he is wearing. He has been staying off of his feet.    General: He denies fevers, chest pain, breathing problems, and stomach problems. He denies feeling any heart flutter or tachycardia. When he takes his blood pressure, the machine occasionally indicates an irregular beat.    Review of Systems:   Pertinent items are noted in HPI or as in patient entered ROS below, remainder of complete ROS is negative.     Active Medications:     Current Outpatient Medications:      clopidogrel (PLAVIX) 75 MG tablet, Take 1 tablet (75 mg) by mouth every evening, Disp: 90 tablet, Rfl: 3     Continuous Blood Gluc  (FREESTYLE LATOYA 14 DAY READER) TANIA, 1 Device every hour as needed (every hour prn), Disp: 1 Device, Rfl: 0     continuous blood glucose monitoring (FREESTYLE LATOYA) sensor, For use with Freestyle Latoya Flash  for continuous monitioring of blood glucose levels. Replace sensor every 10 days., Disp: 1 each, Rfl: 0     ammonium lactate (LAC-HYDRIN) 12 % cream, Apply topically 2 times daily as needed for dry skin, Disp: 385 g, Rfl: 3     ascorbic acid 500 MG TABS, Take 1 tablet (500 mg) by  "mouth 2 times daily, Disp: 30 tablet, Rfl:      ASPIRIN PO, Take 81 mg by mouth daily, Disp: , Rfl:      blood glucose monitoring (FREESTYLE) lancets, Use to test blood sugars 4 times daily as directed., Disp: 100 each, Rfl: 11     Blood Pressure KIT, 1 Device daily, Disp: 1 kit, Rfl: 0     clindamycin (CLEOCIN) 300 MG capsule, Take 1 capsule (300 mg) by mouth 2 times daily (Patient not taking: Reported on 9/25/2019), Disp: 60 capsule, Rfl: 0     dulaglutide (TRULICITY) 1.5 MG/0.5ML pen, Inject 1.5 mg Subcutaneous every 7 days, Disp: 2 mL, Rfl: 2     ferrous sulfate (IRON) 325 (65 FE) MG tablet, Take 1 tablet (325 mg) by mouth 2 times daily, Disp: 60 tablet, Rfl: 11     Gauze Pads & Dressings (OPTIFOAM) 6\"X6\" PADS, 1 Box once a week, Disp: 1 each, Rfl: 6     gentamicin (GARAMYCIN) 0.1 % external cream, Apply to left foot and leg ulcers. (Patient not taking: Reported on 9/25/2019), Disp: 30 g, Rfl: 5     insulin glargine (LANTUS PEN) 100 UNIT/ML pen, Inject 4 Units Subcutaneous At Bedtime Increase by 2 units until goal sugar of 100-130 reached, max 10 units., Disp: 3 mL, Rfl: 3     insulin lispro (HUMALOG PEN) 100 UNIT/ML pen, 4 UNITS WITH BREAKFAST, 3 UNITS WITH LUNCH AND DINNER, Disp: 3 mL, Rfl: 3     insulin pen needle (B-D U/F) 31G X 8 MM miscellaneous, USE FOUR DAILY AS DIRECTED, Disp: 100 each, Rfl: 11     ketoconazole (NIZORAL) 2 % external shampoo, Apply topically twice a week Leave on for 3-5 min then rinse off; after 2-4 weeks use only once weekly, Disp: 120 mL, Rfl: 3     lisinopril (PRINIVIL/ZESTRIL) 20 MG tablet, Take 0.5 tablets (10 mg) by mouth daily, Disp: 90 tablet, Rfl: 3     ONETOUCH ULTRA test strip, TEST TWICE DAILY AS DIRECTED, Disp: 200 strip, Rfl: 3     order for DME, Equipment being ordered: Roll a bout knee scooter, Disp: 1 Device, Rfl: 0     order for DME, Please measure and distribute 1 pair of 30mmHg - 40mmHg knee high open toe ulcercare compression stockings. Jobst ultrasheer or " equivalent., Disp: 2 each, Rfl: 6     polyethylene glycol (MIRALAX/GLYCOLAX) powder, Take 17 g (1 capful) by mouth daily, Disp: 255 g, Rfl: 1     silver sulfADIAZINE (SILVADENE) 1 % external cream, Apply topically daily To affected areas on right foot and leg., Disp: 85 g, Rfl: 5     simvastatin (ZOCOR) 40 MG tablet, Take 1 tablet (40 mg) by mouth At Bedtime, Disp: 90 tablet, Rfl: 3     triamcinolone (KENALOG) 0.1 % external cream, Apply sparingly to left heel daily., Disp: 60 g, Rfl: 1     VITAMIN D, CHOLECALCIFEROL, PO, Take 1,000 Units by mouth 2 times daily, Disp: , Rfl:       Allergies:   Lisinopril; Neomycin; Methylchloroisothiazolinone [methylisothiazolinone]; and Povidone iodine      Past Medical History:  Anemia  Coronary artery disease  Chronic kidney disease stage 3  Colon polyp   Emphysema of lung  Heart disease  Hypertension  Hyperlipidemia  MRSA cellulitis of right foot  Peripheral artery disease  Type 2 diabetes mellitus  Venous ulcer  Senile nuclear sclerosis  Fracture of neck of femur  Critical lower limb ischemia  Non-healing ulcer  Arthrosclerosis of native arteries of right leg with ulceration of ankle and foot     Past Surgical History:  Angiogram: 9/14/18  Angioplasty: 9/14/18  Arthroplasty hip, left: 8/27/17  Cardiac surgery  Colonoscopy: 4/18/18, 6/12/19  Endarterectomy femoral, right: 9/14/18  Cervical disc surgery/fusion  Bone removal right foot: 2009  Vascular surgery: [id number]    Family History:   Colon cancer: father  Kidney disease: father, mother  Myocardial infarction: son  Macular degeneration: brother      Social History:   Smoking status: current every day smoker   Alcohol use: no     Physical Exam:   /62   Pulse 97   Wt 69.4 kg (153 lb)   SpO2 95%   BMI 19.64 kg/m      Constitutional: Alert, oriented, pleasant, no acute distress  Head: Normocephalic, atraumatic  Eyes: Extra-ocular movements intact, no scleral icterus  ENT: Oropharynx clear, moist mucus membranes, poor  dentition  Neck: Supple, no lymphadenopathy  Cardiovascular: Regular rate and rhythm, no murmurs, rubs or gallops, peripheral pulses full/symmetric  Respiratory: Good air movement bilaterally, lungs clear, no wheezes/rales/rhonchi  Musculoskeletal: No edema, normal muscle tone, sitting in wheelchair. Non weight-bearing. Wearing CAM walker.   Neurologic: Alert and oriented, cranial nerves 2-12 intact.  Skin: No rashes/lesions. Foot bandaged and not examined.   Psychiatric: normal mentation, affect and mood     Recent Labs:  Component      Latest Ref Rng & Units 7/10/2019   WBC      4.0 - 11.0 10e9/L 6.5   RBC Count      4.4 - 5.9 10e12/L 2.85 (L)   Hemoglobin      13.3 - 17.7 g/dL 8.5 (L)   Hematocrit      40.0 - 53.0 % 26.9 (L)   MCV      78 - 100 fl 94   MCH      26.5 - 33.0 pg 29.8   MCHC      31.5 - 36.5 g/dL 31.6   RDW      10.0 - 15.0 % 13.2   Platelet Count      150 - 450 10e9/L 286   Diff Method       Automated Method   % Neutrophils      % 79.1   % Lymphocytes      % 10.6   % Monocytes      % 7.4   % Eosinophils      % 0.9   % Basophils      % 0.8   % Immature Granulocytes      % 1.2   Absolute Neutrophil      1.6 - 8.3 10e9/L 5.2   Absolute Lymphocytes      0.8 - 5.3 10e9/L 0.7 (L)   Absolute Monocytes      0.0 - 1.3 10e9/L 0.5   Absolute Eosinophils      0.0 - 0.7 10e9/L 0.1   Absolute Basophils      0.0 - 0.2 10e9/L 0.1   Abs Immature Granulocytes      0 - 0.4 10e9/L 0.1   Albumin Fraction      3.7 - 5.1 g/dL 3.4 (L)   Alpha 1 Fraction      0.2 - 0.4 g/dL 0.5 (H)   Alpha 2 Fraction      0.5 - 0.9 g/dL 1.1 (H)   Beta Fraction      0.6 - 1.0 g/dL 1.1 (H)   Gamma Fraction      0.7 - 1.6 g/dL 2.0 (H)   Monoclonal Peak      0.0 g/dL 0.2 (H)   ELP Interpretation:       Small monoclonal protein (0.2 g/dL) seen in the gamma fraction. See immunofixation report . . .   Immunofixation ELP       (Note)   IGG      695 - 1,620 mg/dL 1,890 (H)   IGA      70 - 380 mg/dL 604 (H)   IGM      60 - 265 mg/dL 20 (L)   Iron       35 - 180 ug/dL 24 (L)   Iron Binding Cap      240 - 430 ug/dL 182 (L)   Iron Saturation Index      15 - 46 % 14 (L)   % Retic      0.5 - 2.0 % 1.0   Absolute Retic      25 - 95 10e9/L 29.4   Immunofix ELP Urine       No monoclonal protein seen on immunofixation.  Pathological significance requires clinical . . .   ELP Interpretation Urine       Only trace albumin and trace globulins. No obvious monoclonal protein seen. Pathologic . . .   Lactate Dehydrogenase      85 - 227 U/L 134   Folate      >5.4 ng/mL 13.6   Vitamin B12      193 - 986 pg/mL 316   Ferritin      26 - 388 ng/mL 366   Copath Report       Patient Name: RASHEED SORIANO .   Erythropoietin      4 - 27 mU/mL 8       EKG:  EKG was indicated based on risk assessment. Sinus with PACs, no atrial fibrillation or ischemia.     Assessment and Plan:  Age-related cataract of both eyes, unspecified age-related cataract type  1. Pre-operative assessment for cataract extraction and intraocular lens implantation.  The patient is at LOW risk for cardiovascular complications and at LOW risk for pulmonary complications of this LOW risk surgery.    --Approval given to proceed with proposed procedure, without further diagnostic evaluation  --Patient is taking diabetes medications.   -----Hold short acting insulin (e.g. Novolog, Humalog) while NPO (fasting) and long-acting insulin morning of surgery. Patient will contact his ophthalmologist to ensure he can continue Plavix.     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 He continues to follow with podiatry.     Type 2 diabetes mellitus with diabetic peripheral angiopathy without gangrene, with long-term current use of insulin (H)  A1C was 5.6 today, which is surprisingly low. Patient denies significant hypoglycemic events, although he has had a few blood sugar readings less than 100. I advised him that we are trying to keep his blood  sugar above 100 for safety reasons. We discussed proceeding with a CGM, which may help us manage his diabetes. I do not advise increasing any medications and would consider reducing his Lantus if he continues to have low readings. He will follow up with diabetes education.   - continuous blood glucose monitoring (FREESTYLE LATOYA) sensor  Dispense: 1 each; Refill: 0  - Continuous Blood Gluc  (FREESTYLE LATOYA 14 DAY READER) TANIA  Dispense: 1 Device; Refill: 0    Essential hypertension  Stable.     Encounter for screening for lung cancer  Patient deferred lung screening for the time being given multiple ongoing health issues.     Peripheral vascular disease, unspecified (H)  - clopidogrel (PLAVIX) 75 MG tablet  Dispense: 90 tablet; Refill: 3    Need for influenza vaccination  - FLU VACCINE HIGH DOSE PRESERVATIVE FREE, AGE =>65 YR    Other cardiac arrhythmia  - EKG Performed in Clinic w/ Provider Reading Fee      Routine Health Maintenance  Immunizations:   Most Recent Immunizations   Administered Date(s) Administered     Influenza (High Dose) 3 valent vaccine 10/04/2019     Influenza (IIV3) PF 11/01/2013     Pneumo Conj 13-V (2010&after) 11/03/2014     Pneumococcal 23 valent 12/21/2015     TDAP Vaccine (Boostrix) 03/21/2016       Lipids:               Recent Labs   Lab Test 03/27/19  0934 11/16/18  0915   11/18/14  0853   CHOL 95 100   < > 110   HDL 38* 48   < > 45   LDL 49 42   < > 45   TRIG 40 50   < > 100   CHOLHDLRATIO  --   --   --  2.4    < > = values in this interval not displayed.      PSA (50-75 yrs): No results found for: PSA    AAA Screening (65-75 yrs): CT 12/17, negative  Lung Ca Screening (>30 py 55-79 or >20 py 50-79 + RF): 5/18 5x3 mm nodule  Colonoscopy (50-75 yrs): 4/18, recommend repeat when off plavix, pending for June and he will hold plavix x 5 days  HIV/HCV if risk factors: nonreactive HepC 6/16  Safety/Lifestyle: reviewed  Tob/EtOH: declines quitting  Depression:   PHQ-2  Score:      PHQ-2 ( 1999 Pfizer) 11/20/2018 9/18/2018   Q1: Little interest or pleasure in doing things 0 0   Q2: Feeling down, depressed or hopeless 0 0   PHQ-2 Score 0 0      Advanced Directive: deferred    Follow-up: Return to clinic as needed.      Scribe Disclosure:  Alpa TAPIA, am serving as a scribe to document services personally performed by Racheal Swift MD at this visit, based upon the provider's statements to me. All documentation has been reviewed by the aforementioned provider prior to being entered into the official medical record.    Scribe Preparation Attestation:  Alpa TAPIA, a scribe, prepared the chart for today's encounter.      Portions of this medical record were completed by a scribe. UPON MY REVIEW AND AUTHENTICATION BY ELECTRONIC SIGNATURE, this confirms (a) I performed the applicable clinical services, and (b) the record is accurate.    Racheal Swift MD  Internal Medicine    40 min spent face to face, of which >50% time spent on counseling/coordinating care exclusive of any procedure time        Racheal Swift MD

## 2019-10-04 NOTE — PROGRESS NOTES
[facility]   Racheal Swift MD  10/04/2019     Chief Complaint:   Pre-Op Exam        History of Present Illness:   Amos Walker is 74 year old male here at the request of Dr. Purdy for cardiovascular, pulmonary, and perioperative risk assessment prior to surgery.  The intended surgical procedure is cataract extraction and intraocular lens implantation.  A copy of this note will be sent to the surgeon.     Reason for surgery:  He has cataracts in both eyes. He has been experiencing a film and cloudiness over his eyes.     Cardiovascular Risk:  This patient is currently in a wheelchair. He does have a history of heart disease.  There is a history of coronary artery disease.    Pulmonary Risk:  In terms of risk factors for pulmonary complications, the patient does have a history of Emphysema    Perioperative Complications:  The patient does not have a history of bleeding or clotting problems in the past, specifically has no history of DVT, PE or bleeding disorder.  They are currently anticoagulated on Plavix 75 mg daily.  He has not had complications from past surgeries.  The patient does not have a family history of any anesthesia or surgical complications.  The patient has been told to not take any aspirin or NSAIDs for 10 days prior to surgery. The patient has been instructed as to what to do with medications prior to surgery.    Other concerns:  Type 2 diabetes mellitus: His A1C today was 5.6. He sent a list of blood sugar readings via [employer]. 2 hours after lunch yesterday, his blood glucose was 87. At this time, he felt shaky. After dinner, it was 206. Depending on what he eats, he has to be conscious of his blood sugar after dinner. He reports that it is usually over 105 in the mornings. The diabetic educator suggested that he look into the Freestyle Danny test sensors. He has researched this online, and thinks that it would work well for him. He is happy with his management, but needs  to make sure that his high and low values are not too extreme. He currently takes 6 units of Lantus daily, 4 units of Humalog daily with breakfast and 3 units of Humalog daily with lunch and dinner, along with Trulicity 1.5 mg once weekly. He would like to gain weight, but has not been able to. He eats healthy meals prepared by his wife.     Hematology: He canceled his hematology appointment with Dr. Nielsen because of his upcoming cataract surgery.     Charcot Foot: He has a foot x-ray next week. His wounds are scabbing over and the swelling has decreased. He put Hydrofera Blue on one of the scabs that is beginning to come off and covered it with a bandage. His podiatry appointments have been helpful. He has had some irritation with the boot he is wearing. He has been staying off of his feet.    General: He denies fevers, chest pain, breathing problems, and stomach problems. He denies feeling any heart flutter or tachycardia. When he takes his blood pressure, the machine occasionally indicates an irregular beat.    Review of Systems:   Pertinent items are noted in HPI or as in patient entered ROS below, remainder of complete ROS is negative.     Active Medications:     Current Outpatient Medications:      clopidogrel (PLAVIX) 75 MG tablet, Take 1 tablet (75 mg) by mouth every evening, Disp: 90 tablet, Rfl: 3     Continuous Blood Gluc  (FREESTYLE LATOYA 14 DAY READER) TANIA, 1 Device every hour as needed (every hour prn), Disp: 1 Device, Rfl: 0     continuous blood glucose monitoring (FREESTYLE LATOYA) sensor, For use with Freestyle Latoya Flash  for continuous monitioring of blood glucose levels. Replace sensor every 10 days., Disp: 1 each, Rfl: 0     ammonium lactate (LAC-HYDRIN) 12 % cream, Apply topically 2 times daily as needed for dry skin, Disp: 385 g, Rfl: 3     ascorbic acid 500 MG TABS, Take 1 tablet (500 mg) by mouth 2 times daily, Disp: 30 tablet, Rfl:      ASPIRIN PO, Take 81 mg by mouth daily,  "Disp: , Rfl:      blood glucose monitoring (FREESTYLE) lancets, Use to test blood sugars 4 times daily as directed., Disp: 100 each, Rfl: 11     Blood Pressure KIT, 1 Device daily, Disp: 1 kit, Rfl: 0     clindamycin (CLEOCIN) 300 MG capsule, Take 1 capsule (300 mg) by mouth 2 times daily (Patient not taking: Reported on 9/25/2019), Disp: 60 capsule, Rfl: 0     dulaglutide (TRULICITY) 1.5 MG/0.5ML pen, Inject 1.5 mg Subcutaneous every 7 days, Disp: 2 mL, Rfl: 2     ferrous sulfate (IRON) 325 (65 FE) MG tablet, Take 1 tablet (325 mg) by mouth 2 times daily, Disp: 60 tablet, Rfl: 11     Gauze Pads & Dressings (OPTIFOAM) 6\"X6\" PADS, 1 Box once a week, Disp: 1 each, Rfl: 6     gentamicin (GARAMYCIN) 0.1 % external cream, Apply to left foot and leg ulcers. (Patient not taking: Reported on 9/25/2019), Disp: 30 g, Rfl: 5     insulin glargine (LANTUS PEN) 100 UNIT/ML pen, Inject 4 Units Subcutaneous At Bedtime Increase by 2 units until goal sugar of 100-130 reached, max 10 units., Disp: 3 mL, Rfl: 3     insulin lispro (HUMALOG PEN) 100 UNIT/ML pen, 4 UNITS WITH BREAKFAST, 3 UNITS WITH LUNCH AND DINNER, Disp: 3 mL, Rfl: 3     insulin pen needle (B-D U/F) 31G X 8 MM miscellaneous, USE FOUR DAILY AS DIRECTED, Disp: 100 each, Rfl: 11     ketoconazole (NIZORAL) 2 % external shampoo, Apply topically twice a week Leave on for 3-5 min then rinse off; after 2-4 weeks use only once weekly, Disp: 120 mL, Rfl: 3     lisinopril (PRINIVIL/ZESTRIL) 20 MG tablet, Take 0.5 tablets (10 mg) by mouth daily, Disp: 90 tablet, Rfl: 3     ONETOUCH ULTRA test strip, TEST TWICE DAILY AS DIRECTED, Disp: 200 strip, Rfl: 3     order for DME, Equipment being ordered: Roll a bout knee scooter, Disp: 1 Device, Rfl: 0     order for DME, Please measure and distribute 1 pair of 30mmHg - 40mmHg knee high open toe ulcercare compression stockings. Jobst ultrasheer or equivalent., Disp: 2 each, Rfl: 6     polyethylene glycol (MIRALAX/GLYCOLAX) powder, Take 17 g " (1 capful) by mouth daily, Disp: 255 g, Rfl: 1     silver sulfADIAZINE (SILVADENE) 1 % external cream, Apply topically daily To affected areas on right foot and leg., Disp: 85 g, Rfl: 5     simvastatin (ZOCOR) 40 MG tablet, Take 1 tablet (40 mg) by mouth At Bedtime, Disp: 90 tablet, Rfl: 3     triamcinolone (KENALOG) 0.1 % external cream, Apply sparingly to left heel daily., Disp: 60 g, Rfl: 1     VITAMIN D, CHOLECALCIFEROL, PO, Take 1,000 Units by mouth 2 times daily, Disp: , Rfl:       Allergies:   Lisinopril; Neomycin; Methylchloroisothiazolinone [methylisothiazolinone]; and Povidone iodine      Past Medical History:  Anemia  Coronary artery disease  Chronic kidney disease stage 3  Colon polyp   Emphysema of lung  Heart disease  Hypertension  Hyperlipidemia  MRSA cellulitis of right foot  Peripheral artery disease  Type 2 diabetes mellitus  Venous ulcer  Senile nuclear sclerosis  Fracture of neck of femur  Critical lower limb ischemia  Non-healing ulcer  Arthrosclerosis of native arteries of right leg with ulceration of ankle and foot     Past Surgical History:  Angiogram: 9/14/18  Angioplasty: 9/14/18  Arthroplasty hip, left: 8/27/17  Cardiac surgery  Colonoscopy: 4/18/18, 6/12/19  Endarterectomy femoral, right: 9/14/18  Cervical disc surgery/fusion  Bone removal right foot: 2009  Vascular surgery: [id number]    Family History:   Colon cancer: father  Kidney disease: father, mother  Myocardial infarction: son  Macular degeneration: brother      Social History:   Smoking status: current every day smoker   Alcohol use: no     Physical Exam:   /62   Pulse 97   Wt 69.4 kg (153 lb)   SpO2 95%   BMI 19.64 kg/m     Constitutional: Alert, oriented, pleasant, no acute distress  Head: Normocephalic, atraumatic  Eyes: Extra-ocular movements intact, no scleral icterus  ENT: Oropharynx clear, moist mucus membranes, poor dentition  Neck: Supple, no lymphadenopathy  Cardiovascular: Regular rate and rhythm, no murmurs,  rubs or gallops, peripheral pulses full/symmetric  Respiratory: Good air movement bilaterally, lungs clear, no wheezes/rales/rhonchi  Musculoskeletal: No edema, normal muscle tone, sitting in wheelchair. Non weight-bearing. Wearing CAM walker.   Neurologic: Alert and oriented, cranial nerves 2-12 intact.  Skin: No rashes/lesions. Foot bandaged and not examined.   Psychiatric: normal mentation, affect and mood     Recent Labs:  Component      Latest Ref Rng & Units 7/10/2019   WBC      4.0 - 11.0 10e9/L 6.5   RBC Count      4.4 - 5.9 10e12/L 2.85 (L)   Hemoglobin      13.3 - 17.7 g/dL 8.5 (L)   Hematocrit      40.0 - 53.0 % 26.9 (L)   MCV      78 - 100 fl 94   MCH      26.5 - 33.0 pg 29.8   MCHC      31.5 - 36.5 g/dL 31.6   RDW      10.0 - 15.0 % 13.2   Platelet Count      150 - 450 10e9/L 286   Diff Method       Automated Method   % Neutrophils      % 79.1   % Lymphocytes      % 10.6   % Monocytes      % 7.4   % Eosinophils      % 0.9   % Basophils      % 0.8   % Immature Granulocytes      % 1.2   Absolute Neutrophil      1.6 - 8.3 10e9/L 5.2   Absolute Lymphocytes      0.8 - 5.3 10e9/L 0.7 (L)   Absolute Monocytes      0.0 - 1.3 10e9/L 0.5   Absolute Eosinophils      0.0 - 0.7 10e9/L 0.1   Absolute Basophils      0.0 - 0.2 10e9/L 0.1   Abs Immature Granulocytes      0 - 0.4 10e9/L 0.1   Albumin Fraction      3.7 - 5.1 g/dL 3.4 (L)   Alpha 1 Fraction      0.2 - 0.4 g/dL 0.5 (H)   Alpha 2 Fraction      0.5 - 0.9 g/dL 1.1 (H)   Beta Fraction      0.6 - 1.0 g/dL 1.1 (H)   Gamma Fraction      0.7 - 1.6 g/dL 2.0 (H)   Monoclonal Peak      0.0 g/dL 0.2 (H)   ELP Interpretation:       Small monoclonal protein (0.2 g/dL) seen in the gamma fraction. See immunofixation report . . .   Immunofixation ELP       (Note)   IGG      695 - 1,620 mg/dL 1,890 (H)   IGA      70 - 380 mg/dL 604 (H)   IGM      60 - 265 mg/dL 20 (L)   Iron      35 - 180 ug/dL 24 (L)   Iron Binding Cap      240 - 430 ug/dL 182 (L)   Iron Saturation Index       15 - 46 % 14 (L)   % Retic      0.5 - 2.0 % 1.0   Absolute Retic      25 - 95 10e9/L 29.4   Immunofix ELP Urine       No monoclonal protein seen on immunofixation.  Pathological significance requires clinical . . .   ELP Interpretation Urine       Only trace albumin and trace globulins. No obvious monoclonal protein seen. Pathologic . . .   Lactate Dehydrogenase      85 - 227 U/L 134   Folate      >5.4 ng/mL 13.6   Vitamin B12      193 - 986 pg/mL 316   Ferritin      26 - 388 ng/mL 366   Copath Report       Patient Name: RASHEED SORIANO .   Erythropoietin      4 - 27 mU/mL 8       EKG:  EKG was indicated based on risk assessment. Sinus with PACs, no atrial fibrillation or ischemia.     Assessment and Plan:  Age-related cataract of both eyes, unspecified age-related cataract type  1. Pre-operative assessment for cataract extraction and intraocular lens implantation.  The patient is at LOW risk for cardiovascular complications and at LOW risk for pulmonary complications of this LOW risk surgery.    --Approval given to proceed with proposed procedure, without further diagnostic evaluation  --Patient is taking diabetes medications.   -----Hold short acting insulin (e.g. Novolog, Humalog) while NPO (fasting) and long-acting insulin morning of surgery. Patient will contact his ophthalmologist to ensure he can continue Plavix.     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 He continues to follow with podiatry.     Type 2 diabetes mellitus with diabetic peripheral angiopathy without gangrene, with long-term current use of insulin (H)  A1C was 5.6 today, which is surprisingly low. Patient denies significant hypoglycemic events, although he has had a few blood sugar readings less than 100. I advised him that we are trying to keep his blood sugar above 100 for safety reasons. We discussed proceeding with a CGM, which may help us manage  his diabetes. I do not advise increasing any medications and would consider reducing his Lantus if he continues to have low readings. He will follow up with diabetes education.   - continuous blood glucose monitoring (FREESTYLE LATOYA) sensor  Dispense: 1 each; Refill: 0  - Continuous Blood Gluc  (FREESTYLE LATOYA 14 DAY READER) TANIA  Dispense: 1 Device; Refill: 0    Essential hypertension  Stable.     Encounter for screening for lung cancer  Patient deferred lung screening for the time being given multiple ongoing health issues.     Peripheral vascular disease, unspecified (H)  - clopidogrel (PLAVIX) 75 MG tablet  Dispense: 90 tablet; Refill: 3    Need for influenza vaccination  - FLU VACCINE HIGH DOSE PRESERVATIVE FREE, AGE =>65 YR    Other cardiac arrhythmia  - EKG Performed in Clinic w/ Provider Reading Fee      Routine Health Maintenance  Immunizations:   Most Recent Immunizations   Administered Date(s) Administered     Influenza (High Dose) 3 valent vaccine 10/04/2019     Influenza (IIV3) PF 11/01/2013     Pneumo Conj 13-V (2010&after) 11/03/2014     Pneumococcal 23 valent 12/21/2015     TDAP Vaccine (Boostrix) 03/21/2016       Lipids:               Recent Labs   Lab Test 03/27/19  0934 11/16/18  0915   11/18/14  0853   CHOL 95 100   < > 110   HDL 38* 48   < > 45   LDL 49 42   < > 45   TRIG 40 50   < > 100   CHOLHDLRATIO  --   --   --  2.4    < > = values in this interval not displayed.      PSA (50-75 yrs): No results found for: PSA    AAA Screening (65-75 yrs): CT 12/17, negative  Lung Ca Screening (>30 py 55-79 or >20 py 50-79 + RF): 5/18 5x3 mm nodule  Colonoscopy (50-75 yrs): 4/18, recommend repeat when off plavix, pending for June and he will hold plavix x 5 days  HIV/HCV if risk factors: nonreactive HepC 6/16  Safety/Lifestyle: reviewed  Tob/EtOH: declines quitting  Depression:   PHQ-2 Score:      PHQ-2 ( 1999 Pfizer) 11/20/2018 9/18/2018   Q1: Little interest or pleasure in doing things 0 0   Q2:  Feeling down, depressed or hopeless 0 0   PHQ-2 Score 0 0      Advanced Directive: deferred    Follow-up: Return to clinic as needed.      Scribe Disclosure:  IAlpa, am serving as a scribe to document services personally performed by Racheal Swift MD at this visit, based upon the provider's statements to me. All documentation has been reviewed by the aforementioned provider prior to being entered into the official medical record.    Scribe Preparation Attestation:  Alpa TAPIA, a scribe, prepared the chart for today's encounter.      Portions of this medical record were completed by a scribe. UPON MY REVIEW AND AUTHENTICATION BY ELECTRONIC SIGNATURE, this confirms (a) I performed the applicable clinical services, and (b) the record is accurate.    Racheal Swift MD  Internal Medicine    40 min spent face to face, of which >50% time spent on counseling/coordinating care exclusive of any procedure time

## 2019-10-05 LAB — INTERPRETATION ECG - MUSE: NORMAL

## 2019-10-07 ENCOUNTER — PRE VISIT (OUTPATIENT)
Dept: ONCOLOGY | Facility: CLINIC | Age: 74
End: 2019-10-07

## 2019-10-07 ENCOUNTER — TELEPHONE (OUTPATIENT)
Dept: INTERNAL MEDICINE | Facility: CLINIC | Age: 74
End: 2019-10-07

## 2019-10-07 DIAGNOSIS — Z79.4 TYPE 2 DIABETES MELLITUS WITH DIABETIC PERIPHERAL ANGIOPATHY WITHOUT GANGRENE, WITH LONG-TERM CURRENT USE OF INSULIN (H): ICD-10-CM

## 2019-10-07 DIAGNOSIS — E11.51 TYPE 2 DIABETES MELLITUS WITH DIABETIC PERIPHERAL ANGIOPATHY WITHOUT GANGRENE, WITH LONG-TERM CURRENT USE OF INSULIN (H): ICD-10-CM

## 2019-10-07 NOTE — TELEPHONE ENCOUNTER
[facility]    Phone Message    May a detailed message be left on voicemail: yes    Reason for Call: Medication Question or concern regarding medication   Prescription Clarification  Name of Medication: continuous blood glucose monitoring (FREESTYLE LATOYA) sensor    Prescribing Provider: Racheal Swift MD   Pharmacy: [id number] - [facility] [address] [place] [facility] [place] [address] [facility]   What on the order needs clarification? The pharmacy needs a new order that says to dispense 2 14 days sensors please call pharmacy with any concerns thank you.           Action Taken: Message routed to:  [facility] ([facility] p[facility]

## 2019-10-08 RX ORDER — FLASH GLUCOSE SENSOR
KIT MISCELLANEOUS
Qty: 2 EACH | Refills: 0 | Status: SHIPPED | OUTPATIENT
Start: 2019-10-08 | End: 2019-11-06

## 2019-10-09 ENCOUNTER — ANCILLARY PROCEDURE (OUTPATIENT)
Dept: GENERAL RADIOLOGY | Facility: CLINIC | Age: 74
End: 2019-10-09
Attending: PODIATRIST
Payer: COMMERCIAL

## 2019-10-09 ENCOUNTER — OFFICE VISIT (OUTPATIENT)
Dept: PODIATRY | Facility: CLINIC | Age: 74
End: 2019-10-09
Payer: COMMERCIAL

## 2019-10-09 VITALS — DIASTOLIC BLOOD PRESSURE: 62 MMHG | HEART RATE: 82 BPM | OXYGEN SATURATION: 97 % | SYSTOLIC BLOOD PRESSURE: 101 MMHG

## 2019-10-09 DIAGNOSIS — E11.49 TYPE II OR UNSPECIFIED TYPE DIABETES MELLITUS WITH NEUROLOGICAL MANIFESTATIONS, NOT STATED AS UNCONTROLLED(250.60) (H): ICD-10-CM

## 2019-10-09 DIAGNOSIS — E11.49 TYPE II OR UNSPECIFIED TYPE DIABETES MELLITUS WITH NEUROLOGICAL MANIFESTATIONS, NOT STATED AS UNCONTROLLED(250.60) (H): Primary | ICD-10-CM

## 2019-10-09 DIAGNOSIS — E11.51 DIABETES MELLITUS WITH PERIPHERAL VASCULAR DISEASE (H): ICD-10-CM

## 2019-10-09 DIAGNOSIS — E11.610 CHARCOT FOOT DUE TO DIABETES MELLITUS (H): ICD-10-CM

## 2019-10-09 DIAGNOSIS — I87.8 VENOUS STASIS: ICD-10-CM

## 2019-10-09 DIAGNOSIS — L97.911 ULCER OF RIGHT LOWER EXTREMITY, LIMITED TO BREAKDOWN OF SKIN (H): ICD-10-CM

## 2019-10-09 PROCEDURE — 99213 OFFICE O/P EST LOW 20 MIN: CPT | Performed by: PODIATRIST

## 2019-10-09 PROCEDURE — 73630 X-RAY EXAM OF FOOT: CPT | Mod: RT | Performed by: RADIOLOGY

## 2019-10-09 PROCEDURE — 73610 X-RAY EXAM OF ANKLE: CPT | Mod: RT | Performed by: RADIOLOGY

## 2019-10-09 NOTE — PATIENT INSTRUCTIONS
Thanks for coming today.  Ortho/[facility] Medicine Clinic  [address] [place] [address]    To schedule future appointments in Ortho Clinic, you may call [phone].    To schedule ordered imaging by your Provider: Call [facility] at [phone]    [employer] available online at:   [email or web address]    Please call if any further questions or concerns [phone] and ask for the Orthopedic Department. Clinic hours 8 am to 5 pm.    Return to clinic if symptoms worsen.
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
## 2019-10-09 NOTE — NURSING NOTE
Amos Walker's chief complaint for this visit includes:  Chief Complaint   Patient presents with     RECHECK     wound check      PCP: Racheal Swift    Referring Provider:  No referring provider defined for this encounter.    /62   Pulse 82   SpO2 97%   Data Unavailable     Do you need any medication refills at today's visit? no

## 2019-10-15 ENCOUNTER — ALLIED HEALTH/NURSE VISIT (OUTPATIENT)
Dept: OPHTHALMOLOGY | Facility: CLINIC | Age: 74
End: 2019-10-15
Attending: OPHTHALMOLOGY
Payer: COMMERCIAL

## 2019-10-15 DIAGNOSIS — H25.13 NUCLEAR SENILE CATARACT OF BOTH EYES: ICD-10-CM

## 2019-10-15 PROCEDURE — 40000269 ZZH STATISTIC NO CHARGE FACILITY FEE: Mod: ZF

## 2019-10-15 PROCEDURE — 76519 ECHO EXAM OF EYE: CPT | Mod: ZF

## 2019-10-15 ASSESSMENT — VISUAL ACUITY
OD_CC: 20/40
OS_PH_CC: 20/100
OS_CC: 20/125
OD_PH_CC+: -1
METHOD: SNELLEN - LINEAR
OD_PH_CC: 20/30
CORRECTION_TYPE: GLASSES

## 2019-10-15 ASSESSMENT — REFRACTION_WEARINGRX
OD_AXIS: 170
OS_AXIS: 008
OD_CYLINDER: +3.25
OD_SPHERE: -2.00
OS_CYLINDER: +1.25
OS_SPHERE: -1.50

## 2019-10-15 NOTE — NURSING NOTE
No chief complaint on file.    Chief Complaint(s) and History of Present Illness(es)     Vision and IOL calculation only today.  Tech only visit.    He has been using artificial tears and doing warm compresses since his last exam.  VA seems stable in both eyes since his last visit.    ROYAL Sommer 2:05 PM  October 15, 2019

## 2019-10-16 ENCOUNTER — TELEPHONE (OUTPATIENT)
Dept: OPHTHALMOLOGY | Facility: CLINIC | Age: 74
End: 2019-10-16

## 2019-10-16 NOTE — TELEPHONE ENCOUNTER
Discussed lens options with patient. He originally wanted to pursue a goal correction of myopia so that he can read without wearing glasses. Based on his calcs and topography, he would be a good toric monofocal candidate. However, when we discussed cost, he does not feel comfortable paying out of pocked for the extra expense of a toric lens. We discussed that without correction of his corneal astigmatism, he may need glasses for near and distance - he still would rather pursue this option. We will plan to see him on Monday for his left eye CE/IOL.    Rickie Dunham MD  Ophthalmology PGY-2  [facility]  Pager: [phone]

## 2019-10-18 ENCOUNTER — ANESTHESIA EVENT (OUTPATIENT)
Dept: SURGERY | Facility: AMBULATORY SURGERY CENTER | Age: 74
End: 2019-10-18

## 2019-10-20 NOTE — PROGRESS NOTES
Tech visit for testing only, not seen by physician.    Dominic Purdy MD  , Comprehensive Ophthalmology  Department of Ophthalmology and Visual Neurosciences  [facility]

## 2019-10-21 ENCOUNTER — HOSPITAL ENCOUNTER (OUTPATIENT)
Facility: AMBULATORY SURGERY CENTER | Age: 74
End: 2019-10-21
Attending: OPHTHALMOLOGY
Payer: COMMERCIAL

## 2019-10-21 ENCOUNTER — ANESTHESIA (OUTPATIENT)
Dept: SURGERY | Facility: AMBULATORY SURGERY CENTER | Age: 74
End: 2019-10-21

## 2019-10-21 ENCOUNTER — OFFICE VISIT (OUTPATIENT)
Dept: OPHTHALMOLOGY | Facility: CLINIC | Age: 74
End: 2019-10-21
Payer: COMMERCIAL

## 2019-10-21 VITALS
TEMPERATURE: 97.7 F | OXYGEN SATURATION: 98 % | HEART RATE: 61 BPM | RESPIRATION RATE: 16 BRPM | BODY MASS INDEX: 19.15 KG/M2 | DIASTOLIC BLOOD PRESSURE: 57 MMHG | HEIGHT: 75 IN | SYSTOLIC BLOOD PRESSURE: 100 MMHG | WEIGHT: 154 LBS

## 2019-10-21 DIAGNOSIS — H25.13 NUCLEAR SENILE CATARACT OF BOTH EYES: Primary | ICD-10-CM

## 2019-10-21 DIAGNOSIS — Z96.1 PSEUDOPHAKIA: Primary | ICD-10-CM

## 2019-10-21 LAB — GLUCOSE BLDC GLUCOMTR-MCNC: 124 MG/DL (ref 70–99)

## 2019-10-21 DEVICE — EYE IMP IOL AMO PCL TECNIS ZCB00 14.0: Type: IMPLANTABLE DEVICE | Site: EYE | Status: FUNCTIONAL

## 2019-10-21 RX ORDER — ACETAMINOPHEN 325 MG/1
975 TABLET ORAL ONCE
Status: COMPLETED | OUTPATIENT
Start: 2019-10-21 | End: 2019-10-21

## 2019-10-21 RX ORDER — ONDANSETRON 4 MG/1
4 TABLET, ORALLY DISINTEGRATING ORAL EVERY 30 MIN PRN
Status: DISCONTINUED | OUTPATIENT
Start: 2019-10-21 | End: 2019-10-22 | Stop reason: HOSPADM

## 2019-10-21 RX ORDER — PHENYLEPHRINE HYDROCHLORIDE 25 MG/ML
1 SOLUTION/ DROPS OPHTHALMIC
Status: COMPLETED | OUTPATIENT
Start: 2019-10-21 | End: 2019-10-21

## 2019-10-21 RX ORDER — OFLOXACIN 3 MG/ML
1 SOLUTION/ DROPS OPHTHALMIC 3 TIMES DAILY
Qty: 5 ML | Refills: 1 | Status: SHIPPED | OUTPATIENT
Start: 2019-10-21 | End: 2019-11-19

## 2019-10-21 RX ORDER — ONDANSETRON 2 MG/ML
4 INJECTION INTRAMUSCULAR; INTRAVENOUS EVERY 30 MIN PRN
Status: DISCONTINUED | OUTPATIENT
Start: 2019-10-21 | End: 2019-10-22 | Stop reason: HOSPADM

## 2019-10-21 RX ORDER — PREDNISOLONE ACETATE 10 MG/ML
1 SUSPENSION/ DROPS OPHTHALMIC 4 TIMES DAILY
Qty: 5 ML | Refills: 1 | Status: SHIPPED | OUTPATIENT
Start: 2019-10-21 | End: 2020-01-29

## 2019-10-21 RX ORDER — FENTANYL CITRATE 50 UG/ML
25-50 INJECTION, SOLUTION INTRAMUSCULAR; INTRAVENOUS
Status: DISCONTINUED | OUTPATIENT
Start: 2019-10-21 | End: 2019-10-21 | Stop reason: HOSPADM

## 2019-10-21 RX ORDER — CYCLOPENTOLATE HYDROCHLORIDE 10 MG/ML
1 SOLUTION/ DROPS OPHTHALMIC
Status: COMPLETED | OUTPATIENT
Start: 2019-10-21 | End: 2019-10-21

## 2019-10-21 RX ORDER — OXYCODONE HYDROCHLORIDE 5 MG/1
5 TABLET ORAL EVERY 4 HOURS PRN
Status: DISCONTINUED | OUTPATIENT
Start: 2019-10-21 | End: 2019-10-22 | Stop reason: HOSPADM

## 2019-10-21 RX ORDER — LIDOCAINE HYDROCHLORIDE 10 MG/ML
INJECTION, SOLUTION EPIDURAL; INFILTRATION; INTRACAUDAL; PERINEURAL PRN
Status: DISCONTINUED | OUTPATIENT
Start: 2019-10-21 | End: 2019-10-21 | Stop reason: HOSPADM

## 2019-10-21 RX ORDER — KETOROLAC TROMETHAMINE 4 MG/ML
1 SOLUTION/ DROPS OPHTHALMIC 4 TIMES DAILY
Qty: 5 ML | Refills: 0 | Status: SHIPPED | OUTPATIENT
Start: 2019-10-21 | End: 2020-01-29

## 2019-10-21 RX ORDER — TROPICAMIDE 10 MG/ML
1 SOLUTION/ DROPS OPHTHALMIC
Status: COMPLETED | OUTPATIENT
Start: 2019-10-21 | End: 2019-10-21

## 2019-10-21 RX ORDER — NALOXONE HYDROCHLORIDE 0.4 MG/ML
.1-.4 INJECTION, SOLUTION INTRAMUSCULAR; INTRAVENOUS; SUBCUTANEOUS
Status: DISCONTINUED | OUTPATIENT
Start: 2019-10-21 | End: 2019-10-22 | Stop reason: HOSPADM

## 2019-10-21 RX ORDER — TETRACAINE HYDROCHLORIDE 5 MG/ML
SOLUTION OPHTHALMIC PRN
Status: DISCONTINUED | OUTPATIENT
Start: 2019-10-21 | End: 2019-10-21 | Stop reason: HOSPADM

## 2019-10-21 RX ORDER — PROPARACAINE HYDROCHLORIDE 5 MG/ML
1 SOLUTION/ DROPS OPHTHALMIC ONCE
Status: COMPLETED | OUTPATIENT
Start: 2019-10-21 | End: 2019-10-21

## 2019-10-21 RX ORDER — FENTANYL CITRATE 50 UG/ML
INJECTION, SOLUTION INTRAMUSCULAR; INTRAVENOUS PRN
Status: DISCONTINUED | OUTPATIENT
Start: 2019-10-21 | End: 2019-10-21

## 2019-10-21 RX ORDER — SODIUM CHLORIDE, SODIUM LACTATE, POTASSIUM CHLORIDE, CALCIUM CHLORIDE 600; 310; 30; 20 MG/100ML; MG/100ML; MG/100ML; MG/100ML
INJECTION, SOLUTION INTRAVENOUS CONTINUOUS
Status: DISCONTINUED | OUTPATIENT
Start: 2019-10-21 | End: 2019-10-22 | Stop reason: HOSPADM

## 2019-10-21 RX ORDER — TIMOLOL 5 MG/ML
SOLUTION/ DROPS OPHTHALMIC PRN
Status: DISCONTINUED | OUTPATIENT
Start: 2019-10-21 | End: 2019-10-21 | Stop reason: HOSPADM

## 2019-10-21 RX ORDER — MEPERIDINE HYDROCHLORIDE 25 MG/ML
12.5 INJECTION INTRAMUSCULAR; INTRAVENOUS; SUBCUTANEOUS
Status: DISCONTINUED | OUTPATIENT
Start: 2019-10-21 | End: 2019-10-22 | Stop reason: HOSPADM

## 2019-10-21 RX ORDER — DEXAMETHASONE SODIUM PHOSPHATE 4 MG/ML
INJECTION, SOLUTION INTRA-ARTICULAR; INTRALESIONAL; INTRAMUSCULAR; INTRAVENOUS; SOFT TISSUE PRN
Status: DISCONTINUED | OUTPATIENT
Start: 2019-10-21 | End: 2019-10-21 | Stop reason: HOSPADM

## 2019-10-21 RX ORDER — BALANCED SALT SOLUTION 6.4; .75; .48; .3; 3.9; 1.7 MG/ML; MG/ML; MG/ML; MG/ML; MG/ML; MG/ML
SOLUTION OPHTHALMIC PRN
Status: DISCONTINUED | OUTPATIENT
Start: 2019-10-21 | End: 2019-10-21 | Stop reason: HOSPADM

## 2019-10-21 RX ORDER — MOXIFLOXACIN IN NACL,ISO-OS/PF 0.3MG/0.3
SYRINGE (ML) INTRAOCULAR PRN
Status: DISCONTINUED | OUTPATIENT
Start: 2019-10-21 | End: 2019-10-21 | Stop reason: HOSPADM

## 2019-10-21 RX ORDER — LIDOCAINE 40 MG/G
CREAM TOPICAL
Status: DISCONTINUED | OUTPATIENT
Start: 2019-10-21 | End: 2019-10-21 | Stop reason: HOSPADM

## 2019-10-21 RX ORDER — SODIUM CHLORIDE, SODIUM LACTATE, POTASSIUM CHLORIDE, CALCIUM CHLORIDE 600; 310; 30; 20 MG/100ML; MG/100ML; MG/100ML; MG/100ML
500 INJECTION, SOLUTION INTRAVENOUS CONTINUOUS
Status: DISCONTINUED | OUTPATIENT
Start: 2019-10-21 | End: 2019-10-21 | Stop reason: HOSPADM

## 2019-10-21 RX ADMIN — FENTANYL CITRATE 25 MCG: 50 INJECTION, SOLUTION INTRAMUSCULAR; INTRAVENOUS at 10:51

## 2019-10-21 RX ADMIN — CYCLOPENTOLATE HYDROCHLORIDE 1 DROP: 10 SOLUTION/ DROPS OPHTHALMIC at 10:17

## 2019-10-21 RX ADMIN — PHENYLEPHRINE HYDROCHLORIDE 1 DROP: 25 SOLUTION/ DROPS OPHTHALMIC at 10:17

## 2019-10-21 RX ADMIN — TROPICAMIDE 1 DROP: 10 SOLUTION/ DROPS OPHTHALMIC at 10:12

## 2019-10-21 RX ADMIN — CYCLOPENTOLATE HYDROCHLORIDE 1 DROP: 10 SOLUTION/ DROPS OPHTHALMIC at 10:12

## 2019-10-21 RX ADMIN — TROPICAMIDE 1 DROP: 10 SOLUTION/ DROPS OPHTHALMIC at 10:17

## 2019-10-21 RX ADMIN — CYCLOPENTOLATE HYDROCHLORIDE 1 DROP: 10 SOLUTION/ DROPS OPHTHALMIC at 10:23

## 2019-10-21 RX ADMIN — SODIUM CHLORIDE, SODIUM LACTATE, POTASSIUM CHLORIDE, CALCIUM CHLORIDE 1000 ML: 600; 310; 30; 20 INJECTION, SOLUTION INTRAVENOUS at 10:18

## 2019-10-21 RX ADMIN — PHENYLEPHRINE HYDROCHLORIDE 1 DROP: 25 SOLUTION/ DROPS OPHTHALMIC at 10:12

## 2019-10-21 RX ADMIN — PROPARACAINE HYDROCHLORIDE 1 DROP: 5 SOLUTION/ DROPS OPHTHALMIC at 10:12

## 2019-10-21 RX ADMIN — ACETAMINOPHEN 975 MG: 325 TABLET ORAL at 10:21

## 2019-10-21 RX ADMIN — PHENYLEPHRINE HYDROCHLORIDE 1 DROP: 25 SOLUTION/ DROPS OPHTHALMIC at 10:23

## 2019-10-21 RX ADMIN — TROPICAMIDE 1 DROP: 10 SOLUTION/ DROPS OPHTHALMIC at 10:23

## 2019-10-21 ASSESSMENT — EXTERNAL EXAM - LEFT EYE: OS_EXAM: NORMAL

## 2019-10-21 ASSESSMENT — MIFFLIN-ST. JEOR: SCORE: 1534.17

## 2019-10-21 ASSESSMENT — VISUAL ACUITY
OS_SC: 20/100
METHOD: SNELLEN - LINEAR

## 2019-10-21 ASSESSMENT — TONOMETRY
OS_IOP_MMHG: 18
OD_IOP_MMHG: 14
IOP_METHOD: ICARE

## 2019-10-21 ASSESSMENT — COPD QUESTIONNAIRES: COPD: 1

## 2019-10-21 ASSESSMENT — SLIT LAMP EXAM - LIDS: COMMENTS: NORMAL

## 2019-10-21 NOTE — NURSING NOTE
Chief Complaints and History of Present Illnesses   Patient presents with     Post Op (Ophthalmology) Left Eye     POD#0 s/p CE/IOL LE     Chief Complaint(s) and History of Present Illness(es)     Post Op (Ophthalmology) Left Eye     Laterality: left eye    Associated symptoms: Negative for eye pain, headache, floaters and flashes    Comments: POD#0 s/p CE/IOL LE              Comments     Patient notes that his eye feels burning and dryness, denies pain, flashes or floaters    Angeles Felipe October 21, 2019 1:00 PM
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
## 2019-10-21 NOTE — ANESTHESIA POSTPROCEDURE EVALUATION
Anesthesia POST Procedure Evaluation    Patient: Amos Walker   MRN:     [id number] Gender:   male   Age:    74 year old :      1945        Preoperative Diagnosis: Cataracts   Procedure(s):  Left Eye Phacoemulsification with Intraocular Lens, Dexamethasone   Postop Comments: No value filed.       Anesthesia Type:  Not documented  MAC    Reportable Event: NO     PAIN: Uncomplicated   Sign Out status: Comfortable, Well controlled pain     PONV: No PONV   Sign Out status:  No Nausea or Vomiting     Neuro/Psych: Uneventful perioperative course   Sign Out Status: Preoperative baseline; Age appropriate mentation     Airway/Resp.: Uneventful perioperative course   Sign Out Status: Non labored breathing, age appropriate RR; Resp. Status within EXPECTED Parameters     CV: Uneventful perioperative course   Sign Out status: Appropriate BP and perfusion indices; Appropriate HR/Rhythm     Disposition:   Sign Out in:  PACU  Disposition:  Phase II; Home  Recovery Course: Uneventful  Follow-Up: Not required           Last Anesthesia Record Vitals:  CRNA VITALS  10/21/2019 1035 - 10/21/2019 1135      10/21/2019             Pulse:  64    Ht Rate:  63    SpO2:  100 %    Resp Rate (set):  10          Last PACU Vitals:  Vitals Value Taken Time   BP     Temp     Pulse     Resp     SpO2     Temp src     NIBP 104/53 10/21/2019 11:01 AM   Pulse 64 10/21/2019 11:05 AM   SpO2 100 % 10/21/2019 11:05 AM   Resp     Temp     Ht Rate 63 10/21/2019 11:05 AM   Temp 2           Electronically Signed By: Jarod Newell MD, 2019, 11:54 AM

## 2019-10-21 NOTE — ANESTHESIA CARE TRANSFER NOTE
Patient: Amos Walker    Procedure(s):  Left Eye Phacoemulsification with Intraocular Lens, Dexamethasone    Diagnosis: Cataracts  Diagnosis Additional Information: No value filed.    Anesthesia Type:   MAC     Note:  Airway :Room Air  Patient transferred to:Phase II  Handoff Report: Identifed the Patient, Identified the Reponsible Provider, Reviewed the pertinent medical history, Discussed the surgical course, Reviewed Intra-OP anesthesia mangement and issues during anesthesia, Set expectations for post-procedure period and Allowed opportunity for questions and acknowledgement of understanding      Vitals: (Last set prior to Anesthesia Care Transfer)    CRNA VITALS  10/21/2019 1035 - 10/21/2019 1109      10/21/2019             Pulse:  64    Ht Rate:  63    SpO2:  100 %    Resp Rate (set):  10                Electronically Signed By: LEW English CRNA  October 21, 2019  11:09 AM

## 2019-10-21 NOTE — OP NOTE
PREOPERATIVE DIAGNOSIS: Visually significant cataract, Left eye   POSTOPERATIVE DIAGNOSIS: Same   PROCEDURES:   1. Cataract extraction with intraocular lens implant Left eye.  SURGEON: Dominic Purdy M.D.  INDICATIONS: The patient Amos Walker presented to the eye clinic with decreased vision secondary to cataract in the Left eye. The risks, benefits and alternatives to cataract extraction were discussed. The patient elected to proceed. All questions were answered to the patient's satisfaction.   DESCRIPTION OF PROCEDURE:   Prior to the procedure, appropriate cardiac and respiratory monitors were applied to the patient.  In the pre-operative holding area, a drop of topical tetracaine followed by lidocaine gel followed by povidone iodine.  The patient was brought to the operating room where a surgical pause was carried out to identify with all members of the surgical team the correct surgical site.  With adequate anesthesia, the Left eye was prepped and draped in the usual sterile fashion. A lid speculum was placed, and the operating microscope was rotated into position. A paracentesis was created.  Through this limbal paracentesis, the anterior chamber was filled with preservative-free lidocaine followed by viscoelastic.  A temporal wound was created at the limbus using a 2.6 mm blade. A capsulorrhexis was initiated using a bent 25-gauge needle and was completed in continuous and circular fashion using the capsulorrhexis forceps. The lens nucleus was hydrodissected using balanced salt solution.  The lens nucleus was rotated and removed using phacoemulsification in a stop and chop technique.  Residual cortical material was removed using irrigation-aspiration.  The capsular bag was reinflated to its maximal extent with cohesive viscoelastic.  A 14.0 diopter ZCBOO inserted into the capsular bag.  The lens power selected was reviewed using the intraocular lens power measurements that were obtained preoperatively to  confirm that the correct lens was selected for the desired post-operative refractive state. The residual viscoelastic was removed in its entirety, the wound were hydrated and found to be self-sealing.  Intracameral moxifloxacin was administered. Tactile pressure was confirmed to be in a normal range.  Subconjunctival Dexamethasone (2 mg) was injected. The lid speculum was removed and a patch and shield were applied.  The patient tolerated the procedure well, and there were no complications.    PLAN: The patient will be discharged to home and will follow up tomorrow morning in the eye clinic.  EBL:  None  Complications:  None  Implant Name Type Inv. Item Serial No.  Lot No. LRB No. Used   EYE IMP IOL MAGO PCL TECNIS ZCB00 14.0 Lens/Eye Implant EYE IMP IOL MAGO PCL TECNIS ZCB00 14.0 [id number] [employer]  Left 1

## 2019-10-21 NOTE — PROGRESS NOTES
POD#0, status post cataract surgery, OS eye    No complaints.  Denies eye pain.    Impression/Plan:  Pseudophakia, OS: POD0, good post-operative appearance. IOP reasonable.    Start prednisolone 4,3,2,1 weekly taper in the left eye, then stop  Start Ofloxacin TID in the left eye x7 days, then stop.  Start Acular QID and continue until follow up.    Eye protection at all times and eye shield at night for 1 week.    Limited activities with no exercise or heavy lifting for 1 week.    Instructed patient to contact us for decreasing vision, eye pain, new floaters or flashes of light or other concerning symptoms.  Written instructions given  Return to clinic 10/31 at 2:45 PM.      Attending Physician Attestation:  Complete documentation of historical and exam elements from today's encounter can be found in the full encounter summary report (not reduplicated in this progress note).  I personally obtained the chief complaint(s) and history of present illness.  I confirmed and edited as necessary the review of systems, past medical/surgical history, family history, social history, and examination findings as documented by others; and I examined the patient myself.  I personally reviewed the relevant tests, images, and reports as documented above.  I formulated and edited as necessary the assessment and plan and discussed the findings and management plan with the patient and family. . - Dominic Prudy MD

## 2019-10-21 NOTE — DISCHARGE INSTRUCTIONS
[facility]     Home Care Following Cataract Surgery    If you have a gauze eye patch on, please do not remove it until it is removed by your doctor at your first appointment after your surgery.  You will start your eye drops the next day.    OR    If you only have a clear eye shield on, you may remove the eye shield when you get home and begin eye drops today as directed by your doctor.      Wear the clear eye shield for protection when sleeping for the next 5 days.      Do not rub the eye that had the operation.      Your eye might be sensitive to light.  Wearing sunglasses may be more comfortable for you.      You may have some discomfort and irritation.  Acetaminophen (Tylenol) or Ibuprofen (Advil) may be taken for discomfort. If pain persists please call your doctor s office.      Keep the eye that had the surgery dry. You may wash your hair, bathe or shower, but keep your eye closed while doing so.       Avoid bending over, strenuous activity or heavy lifting until this activity has been approved by your doctor.      You have a follow-up appointment with your doctor today or tomorrow at the [facility] ([phone]).  Bring all your prescribed eye drops with you to this follow-up appointment.        If you take glaucoma medications, bring them with you to your follow-up appointment tomorrow.      Use medication exactly as prescribed by your doctor. Wait 1 -3 minutes between eye drops.  You may restart your regular home medications.       Occasional blood-tinged tears are normal the day or two after surgery. However, if there is large or persistent bleeding from the eye, that is not normal, and you should contact the clinic.      Call your doctor s office at [phone] if any of the following should occur:    - Any sudden vision changes, including decreased vision  - Nausea or severe headache  - Increase in pain that is not controlled with  Acetaminophen (Tylenol) or Ibuprofen (Advil)  - Signs of infection (pus, increasing redness or tenderness)  - Severe sensitivity to light  - An increase in floaters (black spots in front of your vision)

## 2019-10-25 ENCOUNTER — THERAPY VISIT (OUTPATIENT)
Dept: PHYSICAL THERAPY | Facility: CLINIC | Age: 74
End: 2019-10-25
Attending: PODIATRIST
Payer: COMMERCIAL

## 2019-10-25 DIAGNOSIS — I87.8 VENOUS STASIS: ICD-10-CM

## 2019-10-25 DIAGNOSIS — E11.49 TYPE II OR UNSPECIFIED TYPE DIABETES MELLITUS WITH NEUROLOGICAL MANIFESTATIONS, NOT STATED AS UNCONTROLLED(250.60) (H): ICD-10-CM

## 2019-10-25 DIAGNOSIS — L97.911 ULCER OF RIGHT LOWER EXTREMITY, LIMITED TO BREAKDOWN OF SKIN (H): ICD-10-CM

## 2019-10-25 DIAGNOSIS — E11.51 DIABETES MELLITUS WITH PERIPHERAL VASCULAR DISEASE (H): ICD-10-CM

## 2019-10-25 DIAGNOSIS — E11.610 CHARCOT FOOT DUE TO DIABETES MELLITUS (H): ICD-10-CM

## 2019-10-25 PROCEDURE — 97116 GAIT TRAINING THERAPY: CPT | Mod: GP | Performed by: PHYSICAL THERAPIST

## 2019-10-25 PROCEDURE — 97110 THERAPEUTIC EXERCISES: CPT | Mod: GP | Performed by: PHYSICAL THERAPIST

## 2019-10-25 PROCEDURE — 97161 PT EVAL LOW COMPLEX 20 MIN: CPT | Mod: GP | Performed by: PHYSICAL THERAPIST

## 2019-10-25 NOTE — PROGRESS NOTES
[facility] Initial Evaluation  Subjective:  The history is provided by the patient.   Amos Walker being seen for gait training on stairs, balance, UE strengthening .   Problem began 10/23/2019. Where condition occurred: at home.Problem occurred: Rt foot surgery to be NWB at all times, pt is now home from hospital, needs to navigate 29 stairs to bedroon and living area,    and reported as 1/10 on pain scale. General health as reported by patient is fair. Pertinent medical history includes:  Anemia, diabetes, kidney disease, heart problems, implanted device, high blood pressure, smoking and other (non healing Rt foot wounds, ).    Surgeries include:  Heart surgery. Other surgery history details: cardiac stint placement, Rt foot .  Current medications:  High blood pressure medication and cardiac medication. Other medications details: diabetes, cholest.     Pain is described as aching and is intermittent.  Since onset symptoms are unchanged.      Patient is Retired clergy .   Barriers include:  Stairs.  Red flags:  Non-healing wounds and progressive neurological deficits.                      Objective:  System    Ankle/Foot Evaluation  ROM:        Strength wnl ankle: left normal.                                                   Hip Evaluation    Hip Strength:  Hip Strength:    Left:    Normal  Right:  Normal                              Functional Testing:  Functional test hip: pt is generally deconditioned, unfamililar with crutches                  Knee Evaluation:  ROM:  Strength:  Normal                                General Evaluation:      Gross Strength:            Upper Extremity:  Normal   Significant findings: poor endurance/quick fatigue                     Balance:    Single Leg Stance--Eyes Open:  Left: +10sec/30 sec                Functional Assessment:  Functional assessment wnl: stairs w/ one crutch/rail cues needed several rests           Gait:    Significant findings:  20'  NWB on Rt w/o cues RW                                     ROS    Assessment/Plan:    Patient is a 74 year old male with gait/weakness/balance  complaints.    Patient has the following significant findings with corresponding treatment plan.                Diagnosis 1:  Gait difficulty   Decreased strength - therapeutic exercise, therapeutic activities and home program  Impaired balance - neuro re-education, gait training, therapeutic activities and home program  Decreased proprioception - neuro re-education, gait training, therapeutic activities and home program  Impaired gait - gait training and home program  Impaired muscle performance - neuro re-education  Decreased function - therapeutic activities  Diagnosis 2:  Deconditioned state    Decreased strength - therapeutic exercise, therapeutic activities and home program  Impaired balance - neuro re-education and therapeutic activities  Impaired gait - gait training  Impaired muscle performance - neuro re-education  Decreased function - therapeutic activities    Therapy Evaluation Codes:   1) History comprised of:   Personal factors that impact the plan of care:      Coping style, Overall behavior pattern and Past/current experiences.    Comorbidity factors that impact the plan of care are:      Diabetes, High blood pressure, Implanted device and Weakness.     Medications impacting care: High blood pressure and diabetes .  2) Examination of Body Systems comprised of:   Body structures and functions that impact the plan of care:      gait .   Activity limitations that impact the plan of care are:      Cooking, Dressing, Squatting/kneeling, Standing and Walking.  3) Clinical presentation characteristics are:   Stable/Uncomplicated.  4) Decision-Making    Low complexity using standardized patient assessment instrument and/or measureable assessment of functional outcome.  Cumulative Therapy Evaluation is: Low complexity.    Previous and current functional limitations:   (See Goal Flow Sheet for this information)    Short term and Long term goals: (See Goal Flow Sheet for this information)     Communication ability:  Patient appears to be able to clearly communicate and understand verbal and written communication and follow directions correctly.  Treatment Explanation - The following has been discussed with the patient:   RX ordered/plan of care  Anticipated outcomes  Possible risks and side effects  This patient would benefit from PT intervention to resume normal activities.   Rehab potential is good.    Frequency:  2 X week, once daily  Duration:  for 4 weeks  Discharge Plan:  Achieve all LTG.  Independent in home treatment program.  Reach maximal therapeutic benefit.    Please refer to the daily flowsheet for treatment today, total treatment time and time spent performing 1:1 timed codes.

## 2019-10-25 NOTE — LETTER
FREDDY HERNANDEZ PT  [address]  [address]  [place] [address]  [phone]    2019    Re: Amos Walker   :   1945  MRN:  [id number]   REFERRING PHYSICIAN:   DONNELL Pena PT  Date of Initial Evaluation:  10/25/2019  Visits:  Rxs Used: 1  Reason for Referral:     Type II or unspecified type diabetes mellitus with neurological manifestations, not stated as uncontrolled(250.60) (H)  Diabetes mellitus with peripheral vascular disease (H)  Charcot foot due to diabetes mellitus (H)  Venous stasis  Ulcer of right lower extremity, limited to breakdown of skin (H)    EVALUATION SUMMARY    [facility] Initial Evaluation  Subjective:  The history is provided by the patient.   Amos Walker being seen for gait training on stairs, balance, UE strengthening .   Problem began 10/23/2019. Where condition occurred: at home.Problem occurred: Rt foot surgery to be NWB at all times, pt is now home from hospital, needs to navigate 29 stairs to bedroon and living area,  and reported as 1/10 on pain scale. General health as reported by patient is fair. Pertinent medical history includes:  Anemia, diabetes, kidney disease, heart problems, implanted device, high blood pressure, smoking and other (non healing Rt foot wounds, ).  Surgeries include:  Heart surgery. Other surgery history details: cardiac stint placement, Rt foot .  Current medications:  High blood pressure medication and cardiac medication. Other medications details: diabetes, cholest.     Pain is described as aching and is intermittent.  Since onset symptoms are unchanged.      Patient is Retired clergy .   Barriers include:  Stairs.  Red flags:  Non-healing wounds and progressive neurological deficits.               Objective:  Ankle/Foot Evaluation  ROM:    Strength wnl ankle: left normal.       Hip Evaluation  Hip Strength:  Hip Strength:    Left:    Normal  Right:  Normal        Re: Amos Walker    :   1945    Functional Testing:  Functional test hip: pt is generally deconditioned, unfamililar with crutches        Knee Evaluation:  ROM:  Strength:  Normal      General Evaluation:  Gross Strength:    Upper Extremity:  Normal   Significant findings: poor endurance/quick fatigue     Balance:    Single Leg Stance--Eyes Open:  Left: +10sec/30 sec        Functional Assessment:  Functional assessment wnl: stairs w/ one crutch/rail cues needed several rests     Gait:    Significant findings:  20' NWB on Rt w/o cues RW                                   Assessment/Plan:    Patient is a 74 year old male with gait/weakness/balance  complaints.    Patient has the following significant findings with corresponding treatment plan.                Diagnosis 1:  Gait difficulty   Decreased strength - therapeutic exercise, therapeutic activities and home program  Impaired balance - neuro re-education, gait training, therapeutic activities and home program  Decreased proprioception - neuro re-education, gait training, therapeutic activities and home program  Impaired gait - gait training and home program  Impaired muscle performance - neuro re-education  Decreased function - therapeutic activities  Diagnosis 2:  Deconditioned state    Decreased strength - therapeutic exercise, therapeutic activities and home program  Impaired balance - neuro re-education and therapeutic activities  Impaired gait - gait training  Impaired muscle performance - neuro re-education  Decreased function - therapeutic activities    Therapy Evaluation Codes:   1) History comprised of:   Personal factors that impact the plan of care:      Coping style, Overall behavior pattern and Past/current experiences.    Comorbidity factors that impact the plan of care are:      Diabetes, High blood pressure, Implanted device and Weakness.     Medications impacting care: High blood pressure and diabetes .  2) Examination of Body Systems comprised of:   Body  structures and functions that impact the plan of care:      gait .   Activity limitations that impact the plan of care are:      Cooking, Dressing, Squatting/kneeling, Standing and Walking.  3) Clinical presentation characteristics are:   Stable/Uncomplicated.  4) Decision-Making    Low complexity using standardized patient assessment instrument and/or measureable assessment of functional outcome.  Cumulative Therapy Evaluation is: Low complexity.    Previous and current functional limitations:  (See Goal Flow Sheet for this information)    Short term and Long term goals: (See Goal Flow Sheet for this information)     Communication ability:  Patient appears to be able to clearly communicate and understand verbal and written communication and follow directions correctly.  Treatment Explanation - The following has been discussed with the patient:   RX ordered/plan of care  Anticipated outcomes  Possible risks and side effects  This patient would benefit from PT intervention to resume normal activities.   Rehab potential is good.    Frequency:  2 X week, once daily  Duration:  for 4 weeks  Discharge Plan:  Achieve all LTG.  Independent in home treatment program.  Reach maximal therapeutic benefit.    Please refer to the daily flowsheet for treatment today, total treatment time and time spent performing 1:1 timed codes.       Thank you for your referral.    INQUIRIES  Therapist: Leonel Charles PT   FREDDY HERNANDEZ PT  [address] [address]  [address]  [place] [address]  Phone: [phone]  Fax: [phone]

## 2019-10-28 ENCOUNTER — MYC MEDICAL ADVICE (OUTPATIENT)
Dept: INTERNAL MEDICINE | Facility: CLINIC | Age: 74
End: 2019-10-28

## 2019-10-30 ENCOUNTER — APPOINTMENT (OUTPATIENT)
Dept: CT IMAGING | Facility: CLINIC | Age: 74
End: 2019-10-30
Attending: EMERGENCY MEDICINE
Payer: COMMERCIAL

## 2019-10-30 ENCOUNTER — TELEPHONE (OUTPATIENT)
Dept: INTERNAL MEDICINE | Facility: CLINIC | Age: 74
End: 2019-10-30

## 2019-10-30 ENCOUNTER — HOSPITAL ENCOUNTER (OUTPATIENT)
Facility: CLINIC | Age: 74
Setting detail: OBSERVATION
Discharge: HOME | End: 2019-11-01
Attending: EMERGENCY MEDICINE | Admitting: FAMILY MEDICINE
Payer: COMMERCIAL

## 2019-10-30 ENCOUNTER — OFFICE VISIT (OUTPATIENT)
Dept: PODIATRY | Facility: CLINIC | Age: 74
End: 2019-10-30
Payer: COMMERCIAL

## 2019-10-30 ENCOUNTER — OFFICE VISIT (OUTPATIENT)
Dept: PEDIATRICS | Facility: CLINIC | Age: 74
End: 2019-10-30
Payer: COMMERCIAL

## 2019-10-30 ENCOUNTER — APPOINTMENT (OUTPATIENT)
Dept: GENERAL RADIOLOGY | Facility: CLINIC | Age: 74
End: 2019-10-30
Attending: EMERGENCY MEDICINE
Payer: COMMERCIAL

## 2019-10-30 VITALS
RESPIRATION RATE: 20 BRPM | HEART RATE: 95 BPM | DIASTOLIC BLOOD PRESSURE: 43 MMHG | OXYGEN SATURATION: 97 % | SYSTOLIC BLOOD PRESSURE: 67 MMHG

## 2019-10-30 VITALS
DIASTOLIC BLOOD PRESSURE: 43 MMHG | OXYGEN SATURATION: 98 % | SYSTOLIC BLOOD PRESSURE: 67 MMHG | TEMPERATURE: 97 F | HEART RATE: 99 BPM

## 2019-10-30 DIAGNOSIS — E11.610 CHARCOT FOOT DUE TO DIABETES MELLITUS (H): ICD-10-CM

## 2019-10-30 DIAGNOSIS — I95.9 HYPOTENSION, UNSPECIFIED HYPOTENSION TYPE: Primary | ICD-10-CM

## 2019-10-30 DIAGNOSIS — L97.512 SKIN ULCER OF RIGHT FOOT WITH FAT LAYER EXPOSED (H): ICD-10-CM

## 2019-10-30 DIAGNOSIS — A09 INFECTIOUS COLITIS: ICD-10-CM

## 2019-10-30 DIAGNOSIS — K52.9 COLITIS PRESUMED INFECTIOUS: Primary | ICD-10-CM

## 2019-10-30 DIAGNOSIS — E11.49 TYPE II OR UNSPECIFIED TYPE DIABETES MELLITUS WITH NEUROLOGICAL MANIFESTATIONS, NOT STATED AS UNCONTROLLED(250.60) (H): Primary | ICD-10-CM

## 2019-10-30 DIAGNOSIS — R42 LIGHTHEADEDNESS: ICD-10-CM

## 2019-10-30 DIAGNOSIS — R42 DIZZINESS: ICD-10-CM

## 2019-10-30 DIAGNOSIS — E11.51 DIABETES MELLITUS WITH PERIPHERAL VASCULAR DISEASE (H): ICD-10-CM

## 2019-10-30 DIAGNOSIS — R11.0 NAUSEA: ICD-10-CM

## 2019-10-30 DIAGNOSIS — I87.8 VENOUS STASIS: ICD-10-CM

## 2019-10-30 DIAGNOSIS — K92.2 GI BLEED: ICD-10-CM

## 2019-10-30 DIAGNOSIS — K55.039 ACUTE ISCHEMIC COLITIS (H): ICD-10-CM

## 2019-10-30 LAB
ABO + RH BLD: NORMAL
ABO + RH BLD: NORMAL
ALBUMIN SERPL-MCNC: 3.6 G/DL (ref 3.4–5)
ALBUMIN UR-MCNC: 30 MG/DL
ALP SERPL-CCNC: 128 U/L (ref 40–150)
ALT SERPL W P-5'-P-CCNC: 16 U/L (ref 0–70)
ANION GAP SERPL CALCULATED.3IONS-SCNC: 9 MMOL/L (ref 3–14)
APPEARANCE UR: ABNORMAL
APTT PPP: 28 SEC (ref 22–37)
AST SERPL W P-5'-P-CCNC: 20 U/L (ref 0–45)
BASOPHILS # BLD AUTO: 0.1 10E9/L (ref 0–0.2)
BASOPHILS NFR BLD AUTO: 0.6 %
BILIRUB SERPL-MCNC: 0.7 MG/DL (ref 0.2–1.3)
BILIRUB UR QL STRIP: NEGATIVE
BLD GP AB SCN SERPL QL: NORMAL
BLOOD BANK CMNT PATIENT-IMP: NORMAL
BUN SERPL-MCNC: 39 MG/DL (ref 7–30)
CALCIUM SERPL-MCNC: 8.5 MG/DL (ref 8.5–10.1)
CHLORIDE SERPL-SCNC: 98 MMOL/L (ref 94–109)
CO2 SERPL-SCNC: 24 MMOL/L (ref 20–32)
COLOR UR AUTO: ABNORMAL
CREAT SERPL-MCNC: 1.35 MG/DL (ref 0.66–1.25)
DIFFERENTIAL METHOD BLD: ABNORMAL
EOSINOPHIL # BLD AUTO: 0 10E9/L (ref 0–0.7)
EOSINOPHIL NFR BLD AUTO: 0.3 %
ERYTHROCYTE [DISTWIDTH] IN BLOOD BY AUTOMATED COUNT: 13.5 % (ref 10–15)
GFR SERPL CREATININE-BSD FRML MDRD: 52 ML/MIN/{1.73_M2}
GLUCOSE BLDC GLUCOMTR-MCNC: 113 MG/DL (ref 70–99)
GLUCOSE SERPL-MCNC: 122 MG/DL (ref 70–99)
GLUCOSE UR STRIP-MCNC: NEGATIVE MG/DL
HCT VFR BLD AUTO: 38.3 % (ref 40–53)
HGB BLD-MCNC: 12.2 G/DL (ref 13.3–17.7)
HGB UR QL STRIP: NEGATIVE
HYALINE CASTS #/AREA URNS LPF: 46 /LPF (ref 0–2)
IMM GRANULOCYTES # BLD: 0.1 10E9/L (ref 0–0.4)
IMM GRANULOCYTES NFR BLD: 0.6 %
INR PPP: 1.2 (ref 0.86–1.14)
KETONES UR STRIP-MCNC: 5 MG/DL
LACTATE BLD-SCNC: 1.1 MMOL/L (ref 0.7–2)
LEUKOCYTE ESTERASE UR QL STRIP: ABNORMAL
LYMPHOCYTES # BLD AUTO: 0.9 10E9/L (ref 0.8–5.3)
LYMPHOCYTES NFR BLD AUTO: 6.2 %
MCH RBC QN AUTO: 31.4 PG (ref 26.5–33)
MCHC RBC AUTO-ENTMCNC: 31.9 G/DL (ref 31.5–36.5)
MCV RBC AUTO: 99 FL (ref 78–100)
MONOCYTES # BLD AUTO: 1.1 10E9/L (ref 0–1.3)
MONOCYTES NFR BLD AUTO: 7.5 %
MUCOUS THREADS #/AREA URNS LPF: PRESENT /LPF
NEUTROPHILS # BLD AUTO: 12.1 10E9/L (ref 1.6–8.3)
NEUTROPHILS NFR BLD AUTO: 84.8 %
NITRATE UR QL: NEGATIVE
NRBC # BLD AUTO: 0 10*3/UL
NRBC BLD AUTO-RTO: 0 /100
PH UR STRIP: 5.5 PH (ref 5–7)
PLATELET # BLD AUTO: 160 10E9/L (ref 150–450)
POTASSIUM SERPL-SCNC: 4 MMOL/L (ref 3.4–5.3)
PROT SERPL-MCNC: 7.5 G/DL (ref 6.8–8.8)
RADIOLOGIST FLAGS: ABNORMAL
RBC # BLD AUTO: 3.89 10E12/L (ref 4.4–5.9)
RBC #/AREA URNS AUTO: 0 /HPF (ref 0–2)
SODIUM SERPL-SCNC: 131 MMOL/L (ref 133–144)
SOURCE: ABNORMAL
SP GR UR STRIP: 1.02 (ref 1–1.03)
SPECIMEN EXP DATE BLD: NORMAL
SPERM #/AREA URNS HPF: PRESENT /HPF
SQUAMOUS #/AREA URNS AUTO: 1 /HPF (ref 0–1)
TRANS CELLS #/AREA URNS HPF: <1 /HPF (ref 0–1)
TROPONIN I SERPL-MCNC: <0.015 UG/L (ref 0–0.04)
UROBILINOGEN UR STRIP-MCNC: 2 MG/DL (ref 0–2)
WBC # BLD AUTO: 14.2 10E9/L (ref 4–11)
WBC #/AREA URNS AUTO: 0 /HPF (ref 0–5)

## 2019-10-30 PROCEDURE — 96368 THER/DIAG CONCURRENT INF: CPT

## 2019-10-30 PROCEDURE — 85025 COMPLETE CBC W/AUTO DIFF WBC: CPT | Performed by: STUDENT IN AN ORGANIZED HEALTH CARE EDUCATION/TRAINING PROGRAM

## 2019-10-30 PROCEDURE — 71046 X-RAY EXAM CHEST 2 VIEWS: CPT

## 2019-10-30 PROCEDURE — 25000128 H RX IP 250 OP 636: Performed by: EMERGENCY MEDICINE

## 2019-10-30 PROCEDURE — 83605 ASSAY OF LACTIC ACID: CPT | Performed by: EMERGENCY MEDICINE

## 2019-10-30 PROCEDURE — 85610 PROTHROMBIN TIME: CPT | Performed by: STUDENT IN AN ORGANIZED HEALTH CARE EDUCATION/TRAINING PROGRAM

## 2019-10-30 PROCEDURE — 25000128 H RX IP 250 OP 636: Performed by: STUDENT IN AN ORGANIZED HEALTH CARE EDUCATION/TRAINING PROGRAM

## 2019-10-30 PROCEDURE — 99285 EMERGENCY DEPT VISIT HI MDM: CPT | Mod: 25 | Performed by: EMERGENCY MEDICINE

## 2019-10-30 PROCEDURE — 84484 ASSAY OF TROPONIN QUANT: CPT | Performed by: STUDENT IN AN ORGANIZED HEALTH CARE EDUCATION/TRAINING PROGRAM

## 2019-10-30 PROCEDURE — 85730 THROMBOPLASTIN TIME PARTIAL: CPT | Performed by: STUDENT IN AN ORGANIZED HEALTH CARE EDUCATION/TRAINING PROGRAM

## 2019-10-30 PROCEDURE — 93010 ELECTROCARDIOGRAM REPORT: CPT | Mod: Z6 | Performed by: EMERGENCY MEDICINE

## 2019-10-30 PROCEDURE — 86900 BLOOD TYPING SEROLOGIC ABO: CPT | Performed by: EMERGENCY MEDICINE

## 2019-10-30 PROCEDURE — 85027 COMPLETE CBC AUTOMATED: CPT | Mod: 91 | Performed by: STUDENT IN AN ORGANIZED HEALTH CARE EDUCATION/TRAINING PROGRAM

## 2019-10-30 PROCEDURE — 84484 ASSAY OF TROPONIN QUANT: CPT | Mod: 91 | Performed by: STUDENT IN AN ORGANIZED HEALTH CARE EDUCATION/TRAINING PROGRAM

## 2019-10-30 PROCEDURE — 96365 THER/PROPH/DIAG IV INF INIT: CPT | Mod: 59

## 2019-10-30 PROCEDURE — 99203 OFFICE O/P NEW LOW 30 MIN: CPT | Performed by: FAMILY MEDICINE

## 2019-10-30 PROCEDURE — 74177 CT ABD & PELVIS W/CONTRAST: CPT

## 2019-10-30 PROCEDURE — 81001 URINALYSIS AUTO W/SCOPE: CPT | Performed by: EMERGENCY MEDICINE

## 2019-10-30 PROCEDURE — 80053 COMPREHEN METABOLIC PANEL: CPT | Performed by: STUDENT IN AN ORGANIZED HEALTH CARE EDUCATION/TRAINING PROGRAM

## 2019-10-30 PROCEDURE — 87040 BLOOD CULTURE FOR BACTERIA: CPT | Mod: 91 | Performed by: EMERGENCY MEDICINE

## 2019-10-30 PROCEDURE — 96366 THER/PROPH/DIAG IV INF ADDON: CPT

## 2019-10-30 PROCEDURE — C9113 INJ PANTOPRAZOLE SODIUM, VIA: HCPCS | Performed by: EMERGENCY MEDICINE

## 2019-10-30 PROCEDURE — 36415 COLL VENOUS BLD VENIPUNCTURE: CPT | Performed by: PODIATRIST

## 2019-10-30 PROCEDURE — 97597 DBRDMT OPN WND 1ST 20 CM/<: CPT | Performed by: PODIATRIST

## 2019-10-30 PROCEDURE — 96361 HYDRATE IV INFUSION ADD-ON: CPT

## 2019-10-30 PROCEDURE — 70450 CT HEAD/BRAIN W/O DYE: CPT

## 2019-10-30 PROCEDURE — 96375 TX/PRO/DX INJ NEW DRUG ADDON: CPT

## 2019-10-30 PROCEDURE — 86850 RBC ANTIBODY SCREEN: CPT | Performed by: EMERGENCY MEDICINE

## 2019-10-30 PROCEDURE — 86901 BLOOD TYPING SEROLOGIC RH(D): CPT | Performed by: EMERGENCY MEDICINE

## 2019-10-30 PROCEDURE — 96376 TX/PRO/DX INJ SAME DRUG ADON: CPT

## 2019-10-30 PROCEDURE — 25800030 ZZH RX IP 258 OP 636: Performed by: EMERGENCY MEDICINE

## 2019-10-30 PROCEDURE — 93005 ELECTROCARDIOGRAM TRACING: CPT

## 2019-10-30 PROCEDURE — 99285 EMERGENCY DEPT VISIT HI MDM: CPT | Mod: 25

## 2019-10-30 PROCEDURE — 82962 GLUCOSE BLOOD TEST: CPT | Performed by: PODIATRIST

## 2019-10-30 RX ORDER — CIPROFLOXACIN 2 MG/ML
400 INJECTION, SOLUTION INTRAVENOUS ONCE
Status: COMPLETED | OUTPATIENT
Start: 2019-10-30 | End: 2019-10-30

## 2019-10-30 RX ORDER — IOPAMIDOL 755 MG/ML
95 INJECTION, SOLUTION INTRAVASCULAR ONCE
Status: COMPLETED | OUTPATIENT
Start: 2019-10-30 | End: 2019-10-30

## 2019-10-30 RX ORDER — CIPROFLOXACIN 2 MG/ML
400 INJECTION, SOLUTION INTRAVENOUS EVERY 12 HOURS
Status: DISCONTINUED | OUTPATIENT
Start: 2019-10-31 | End: 2019-10-31

## 2019-10-30 RX ORDER — SODIUM CHLORIDE 9 MG/ML
1000 INJECTION, SOLUTION INTRAVENOUS CONTINUOUS
Status: DISCONTINUED | OUTPATIENT
Start: 2019-10-30 | End: 2019-10-31

## 2019-10-30 RX ADMIN — CIPROFLOXACIN 400 MG: 2 INJECTION INTRAVENOUS at 20:04

## 2019-10-30 RX ADMIN — PANTOPRAZOLE SODIUM 8 MG/HR: 40 INJECTION, POWDER, FOR SOLUTION INTRAVENOUS at 18:21

## 2019-10-30 RX ADMIN — PANTOPRAZOLE SODIUM 80 MG: 40 INJECTION, POWDER, LYOPHILIZED, FOR SOLUTION INTRAVENOUS at 18:13

## 2019-10-30 RX ADMIN — SODIUM CHLORIDE 1000 ML: 9 INJECTION, SOLUTION INTRAVENOUS at 18:26

## 2019-10-30 RX ADMIN — METRONIDAZOLE 500 MG: 500 INJECTION, SOLUTION INTRAVENOUS at 21:12

## 2019-10-30 RX ADMIN — SODIUM CHLORIDE 1000 ML: 900 INJECTION, SOLUTION INTRAVENOUS at 17:52

## 2019-10-30 RX ADMIN — IOPAMIDOL 95 ML: 755 INJECTION, SOLUTION INTRAVENOUS at 18:48

## 2019-10-30 ASSESSMENT — ENCOUNTER SYMPTOMS
PALPITATIONS: 0
CONSTIPATION: 0
MYALGIAS: 0
VOMITING: 0
FEVER: 0
SHORTNESS OF BREATH: 0
DIARRHEA: 1
ABDOMINAL PAIN: 0
HEADACHES: 1
ANAL BLEEDING: 0
BACK PAIN: 0
LIGHT-HEADEDNESS: 1
NUMBNESS: 0
COLOR CHANGE: 0
NECK PAIN: 0
BLOOD IN STOOL: 0
SORE THROAT: 0
DIZZINESS: 0
FATIGUE: 0
SPEECH DIFFICULTY: 0
DIAPHORESIS: 0
CHILLS: 0
DYSURIA: 0
COUGH: 0
WEAKNESS: 0
RHINORRHEA: 0
NAUSEA: 0

## 2019-10-30 NOTE — TELEPHONE ENCOUNTER
Received a call from Dr. Mcclain's office relaying that the patient was in clinic for an appointment and patient's blood pressure was 67/43, blood sugar: 126.  Patient reported to staff that he was light head and also woke up light headed. Patient states that he has been eating and drinking plenty of fluids. Patient reported to staff that he was feeling better at the current time.     Dr. Mcclain's office was checking to see if provider had any additional follow up or recommendations for the patient.     Scheduled appointment for patient to see PCP in clinic on 11/6/19. Relayed to clinic staff to advise patient that if patient is unable to make appointment to call and reschedule. As well as if patient has a change in symptoms to schedule a sooner appointment or be evaluated sooner. Staff will advise patient. Will send to provider for recommendations. Yoana Villalobos LPN 10/30/2019 2:43 PM
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
## 2019-10-30 NOTE — PATIENT INSTRUCTIONS
Thanks for coming today.  [facility]  [address] [place] [address]    To schedule future appointments in Ortho Clinic, you may call [phone].    To schedule ordered imaging by your Provider: Call [facility] at [phone]    Ateo available online at:   [email or web address]    Please call if any further questions or concerns [phone] and ask for the Orthopedic Department. Clinic hours 8 am to 5 pm.    Return to clinic if symptoms worsen.

## 2019-10-30 NOTE — ED PROVIDER NOTES
"  History     Chief Complaint   Patient presents with     Dizziness     HPI  Amos Walker is a 74 year old male with a history of DM type 2, CKD stage 3, HTN, PVD, CAD s/p stents on Plavix and asa 81 mg, and venous ulcer who presents to the emergency department for evaluation of lightheadedness and hypotension. Patient had 4 episodes of BM at home, another episode of podiatry clinic today. He states these appear normal for him, just increased number of episodes.  Here, he did have an episode of loose stool, which was dark.  He has not noticed any obvious bright red blood in her stool.  He is taking iron supplementation.  He also reports he has had lightheadedness with \"seeing spots\" since this morning, unrelated to position changes.  He does report a mild headache.  In podiatry clinic today, patient's measured blood pressure was 67/43, blood glucose 126. He otherwise feels well and denies any chest pain or shortness of breath. He denies any recent falls or injuries. He usually drinks primarily coffee. Since he was not feeling well this morning, he had 24 ounces of water at home, and another 8 ounces at the podiatry clinic. He has not had any recent systemic antibiotic use.  He recently had increase in his Lantus, but no other medication changes.  He denies any recent travel or major surgery.  He did have cataract surgery on 10/21/2019 without complications.     Otherwise denies any congestion, rhinorrhea, sore throat, hearing changes, heart palpitations, chest pain, shortness of breath, abdominal pain, changes in urination, or new unilateral leg pain or leg swelling.  He reports he inconsistently takes his blood pressures at home, with the most recent numbers from last week which was 120s/50s to 60s.  He reports he has had similar lightheadedness in the past, but never this frequently.    Of note, his last coronary angiography was back in March 2018, and has not had any recent evaluation.  Patient was seen by " "podiatry today in clinic, and they do not feel his chronic ulcers are showing signs of infection.    I have reviewed the Medications, Allergies, Past Medical and Surgical History, and Social History in the Epic system.    Review of Systems   Constitutional: Negative for chills, diaphoresis, fatigue and fever.   HENT: Negative for congestion, hearing loss, rhinorrhea and sore throat.    Eyes: Positive for visual disturbance (occasional \"seeing spots\", no known trigger).   Respiratory: Negative for cough and shortness of breath.    Cardiovascular: Negative for chest pain, palpitations and leg swelling.   Gastrointestinal: Positive for diarrhea (one episode here). Negative for abdominal pain, anal bleeding, blood in stool, constipation, nausea and vomiting.   Genitourinary: Negative for decreased urine volume and dysuria.        Difficulty initiating stream, unchanged from previously   Musculoskeletal: Negative for back pain, myalgias and neck pain.        Positive for cramping sensation in the left hand earlier today.   Skin: Negative for color change, pallor and rash.   Neurological: Positive for light-headedness and headaches (mild headache currently). Negative for dizziness, speech difficulty, weakness and numbness.       Physical Exam   BP: 106/81  Pulse: 85  Temp: 98.2  F (36.8  C)  Resp: 16  SpO2: 98 %  Lying Orthostatic BP: 117/56  Lying Orthostatic Pulse: 76 bpm      Physical Exam  Constitutional:       General: He is not in acute distress.     Appearance: Normal appearance. He is not ill-appearing or diaphoretic.   HENT:      Head: Normocephalic and atraumatic.   Eyes:      Extraocular Movements: Extraocular movements intact.      Conjunctiva/sclera: Conjunctivae normal.   Cardiovascular:      Rate and Rhythm: Normal rate and regular rhythm.      Heart sounds: Normal heart sounds. No murmur.   Pulmonary:      Effort: No prolonged expiration or respiratory distress.      Breath sounds: Decreased air movement " present. Examination of the left-middle field reveals wheezing. Decreased breath sounds (diffusely) and wheezing (inspiratory) present.      Comments: Poor inspiratory effort  Abdominal:      General: Abdomen is flat. Bowel sounds are increased. There is no distension or abdominal bruit.      Tenderness: There is no tenderness. There is no guarding.   Genitourinary:     Rectum: Guaiac result positive (per RN test).   Musculoskeletal: Normal range of motion.      Right lower leg: No edema.      Left lower leg: No edema.   Skin:     General: Skin is warm and dry.      Capillary Refill: Capillary refill takes 2 to 3 seconds.      Findings: No rash.   Neurological:      General: No focal deficit present.      Mental Status: He is alert and oriented to person, place, and time.         ED Course          EKG Interpretation:      Interpreted by Ginny Diaz MD  Time reviewed: 1725  Symptoms at time of EKG: None   Rhythm: normal sinus   Rate: Normal  Axis: Normal  Ectopy: premature atrial contraction  Conduction: normal  ST Segments/ T Waves: No ST-T wave changes and No acute ischemic changes  Q Waves: none  Comparison to prior: Unchanged from 10/4/2019    Clinical Impression: normal EKG with frequent PACs      Critical Care time:  none  Results for orders placed or performed during the hospital encounter of 10/30/19   XR Chest 2 Views    Narrative    Hyperexpanded lungs without focal airspace disease.   CT Abdomen Pelvis w Contrast   Result Value Ref Range    Radiologist flags Infectious versus ischemic colitis (Urgent)     Narrative    EXAMINATION: CT abdomen/pelvis with contrast, 10/30/2019.    INDICATION: elevated WBC, diarrhea, light headedness, hypotension  early, eval for appendicitis, diverticulitis, etc..    COMPARISON: None.    TECHNIQUE: Routine images from the level of the diaphragm through the  pelvis were obtained with contrast.     Contrast: iopamidol (ISOVUE-370) solution 95 mL    Total DLP: 1311  mGy*cm.    FINDINGS:    Number parenchyma is largest. No focal hepatic lesion. Gallbladder  wall is mildly thickened and contrast enhancing. Calcified gallstone  visualized. No intrahepatic or extra hepatic biliary duct dilation. No  pericholecystic edema. The spleen is grossly unremarkable. Several  calcifications in the pancreatic tail and body with adjacent mild  hypodensity likely represent sequela of previous pancreatitis.  Bilateral adrenal glands are unremarkable.    Bilaterally symmetric appearing kidneys. Small subcentimeter  hypodensity in the medial mid kidney which is too small characterize  however likely represent simple cysts. No appreciable renal lesion. No  hydronephrosis. No hydroureter. The bladder is mildly distended  without appreciable lateral wall irregularity. The prostate is mildly  enlarged. The pelvis is obscured by streak artifact from left hip  replacement. No appreciable pelvic masses. There are several  peripherally calcified lesions noted in the pelvis.    There is significant bowel wall thickening and contrast enhancement  noted at the splenic flexure and aspects of the transverse colon. The  large bowel is nondistended. The appendix is unremarkable. Multiple  loops of nondistended small bowel. Small bowel is incompletely  visualized secondary to streak artifact in the pelvis no appreciable  significant bowel wall thickening or contrast enhancement. Stomach is  grossly unremarkable.    Significant atherosclerotic calcification of the abdominal aorta. No  appreciable aneurysmal dilation. Significant calcification and  stenosis of the iliac arteries. No appreciable intra-abdominal  abdominal lymphadenopathy.    Increased centrilobular and diffuse paraseptal emphysematous changes.  No focal airspace opacity. No pleural effusion. No pneumothorax. No  suspicious pulmonary nodules.    Postsurgical changes of left hip replacement. No acute osseous  abnormalities. Soft tissues are grossly  unremarkable. Mild-to-moderate  degenerative changes of the thoracolumbar spine.      Impression    IMPRESSION:    1. Contrast-enhanced the bowel wall thickening of the splenic flexure  and descending colon. Concerning for infectious versus ischemic  colitis.  2. Significant atherosclerotic calcifications of the bilateral iliac  arteries  3. Cholelithiasis without evidence of cholecystitis.  4. Central lobar and paraseptal emphysematous changes in the lungs.     [Urgent Result: Infectious versus ischemic colitis]    Finding was identified on 10/30/2019 7:18 PM.     Dr. Marinelli was contacted by Dr. James at 10/30/2019 7:56 PM and  verbalized understanding of the urgent finding.     I have personally reviewed the examination and initial interpretation  and I agree with the findings.    REBA SPENCE MD   CBC with platelets differential   Result Value Ref Range    WBC 14.2 (H) 4.0 - 11.0 10e9/L    RBC Count 3.89 (L) 4.4 - 5.9 10e12/L    Hemoglobin 12.2 (L) 13.3 - 17.7 g/dL    Hematocrit 38.3 (L) 40.0 - 53.0 %    MCV 99 78 - 100 fl    MCH 31.4 26.5 - 33.0 pg    MCHC 31.9 31.5 - 36.5 g/dL    RDW 13.5 10.0 - 15.0 %    Platelet Count 160 150 - 450 10e9/L    Diff Method Automated Method     % Neutrophils 84.8 %    % Lymphocytes 6.2 %    % Monocytes 7.5 %    % Eosinophils 0.3 %    % Basophils 0.6 %    % Immature Granulocytes 0.6 %    Nucleated RBCs 0 0 /100    Absolute Neutrophil 12.1 (H) 1.6 - 8.3 10e9/L    Absolute Lymphocytes 0.9 0.8 - 5.3 10e9/L    Absolute Monocytes 1.1 0.0 - 1.3 10e9/L    Absolute Eosinophils 0.0 0.0 - 0.7 10e9/L    Absolute Basophils 0.1 0.0 - 0.2 10e9/L    Abs Immature Granulocytes 0.1 0 - 0.4 10e9/L    Absolute Nucleated RBC 0.0    Comprehensive metabolic panel   Result Value Ref Range    Sodium 131 (L) 133 - 144 mmol/L    Potassium 4.0 3.4 - 5.3 mmol/L    Chloride 98 94 - 109 mmol/L    Carbon Dioxide 24 20 - 32 mmol/L    Anion Gap 9 3 - 14 mmol/L    Glucose 122 (H) 70 - 99 mg/dL    Urea  Nitrogen 39 (H) 7 - 30 mg/dL    Creatinine 1.35 (H) 0.66 - 1.25 mg/dL    GFR Estimate 52 (L) >60 mL/min/[1.73_m2]    GFR Estimate If Black 60 (L) >60 mL/min/[1.73_m2]    Calcium 8.5 8.5 - 10.1 mg/dL    Bilirubin Total 0.7 0.2 - 1.3 mg/dL    Albumin 3.6 3.4 - 5.0 g/dL    Protein Total 7.5 6.8 - 8.8 g/dL    Alkaline Phosphatase 128 40 - 150 U/L    ALT 16 0 - 70 U/L    AST 20 0 - 45 U/L   Troponin I   Result Value Ref Range    Troponin I ES <0.015 0.000 - 0.045 ug/L   INR   Result Value Ref Range    INR 1.20 (H) 0.86 - 1.14   Partial thromboplastin time   Result Value Ref Range    PTT 28 22 - 37 sec   Lactic acid whole blood   Result Value Ref Range    Lactic Acid 1.1 0.7 - 2.0 mmol/L   UA with Microscopic   Result Value Ref Range    Color Urine Dark Yellow     Appearance Urine Slightly Cloudy     Glucose Urine Negative NEG^Negative mg/dL    Bilirubin Urine Negative NEG^Negative    Ketones Urine 5 (A) NEG^Negative mg/dL    Specific Gravity Urine 1.020 1.003 - 1.035    Blood Urine Negative NEG^Negative    pH Urine 5.5 5.0 - 7.0 pH    Protein Albumin Urine 30 (A) NEG^Negative mg/dL    Urobilinogen mg/dL 2.0 0.0 - 2.0 mg/dL    Nitrite Urine Negative NEG^Negative    Leukocyte Esterase Urine Small (A) NEG^Negative    Source Clean catch urine     WBC Urine 0 0 - 5 /HPF    RBC Urine 0 0 - 2 /HPF    Squamous Epithelial /HPF Urine 1 0 - 1 /HPF    Transitional Epi <1 0 - 1 /HPF    Mucous Urine Present (A) NEG^Negative /LPF    sperm Present (A) NEG^Negative /HPF    Hyaline Casts 46 (H) 0 - 2 /LPF   EKG 12 lead   Result Value Ref Range    Interpretation ECG Click View Image link to view waveform and result    ABO/Rh type and screen   Result Value Ref Range    ABO O     RH(D) Pos     Antibody Screen Neg     Test Valid Only At          [facility]    Specimen Expires 11/02/2019          Assessments & Plan (with Medical Decision Making)   Amos Walker is a 74 year old male with a history  of DM type 2, CKD stage 3, HTN, PVD, CAD s/p catheterization w/o stents on Plavix and asa 81 mg who presents to the emergency department for evaluation of lightheadedness and hypotension    Differential diagnosis includes, but is not limited to:  Intraabdominal hemorrhage, diverticulitis, AAA, enteric pathogens, arrhythmia, aortic stenosis, PE, MI, vertebrobasilar TIA, congenital conduction abnormalities such as Brugada syndrome, toxic-metabolic syndromes, as well as more benign etiologies such as vasovagal and orthostatic presyncope.  EKG does not show any signs of Brugada syndrome, LVH, or arrhythmia. No     Patient presents with multiple episodes of bowel movements today, 5 soft bowel movements, which appeared typical for him, and one episode of dark diarrhea, with no bright red blood. He has also been feeling intermittently lightheaded since this morning any particular triggers.  He does note a mild headache today.  He was seen in podiatry clinic today with a blood pressure of 67/43. He has otherwise felt well, without any chest pain, shortness of breath, abdominal pain.  He has not noticed any obvious blood in his stool.    On exam, vital signs are within normal limits, blood pressure is normal, he is afebrile.  Patient has a guaiac positive bedside test.  He has poor inspiratory effort, and inspiratory wheezes in the mid left lung.  He has increased bowel sounds and he denies tenderness.  He does appear dry.  He is neurologically intact without any focal defitis and no signs of cerebellar dysfunction to suggest posterior CVA at this time.  EKG is unchanged from previously.  Laboratory work-up includes a CBC with leukocytosis at 14.2, and hemoglobin 12.2 (which is improved from previously).  Lactate WNL.  CMP shows slightly decreased sodium at 131, creatinine 1.35 (elevated 0.1 from previously), elevated BUN at 39 (stable from May 2019).  UA shows no obvious signs of infection.  Troponin is negative.  INR  elevated to 1.2, PTT WNL. Stool studies are pending.  Imaging studies revealed hyperinflated lungs on chest XR, CT abdomen pelvis shows bowel wall thickening of the splenic flexure and transverse colon concerning for infectious colitis versus mesenteric colitis.  I suspect this is most likely infectious colitis given the lack of abdominal pain and benign abdominal exam and normal lactic acid. CT scan of the head reveals no acute intracranial pathology. Patient is started on Protonix given possibility of GI bleed.  He is also given an IV fluid bolus and continuous infusion.  Patient was also given a dose of Cipro and Flagyl here in the ED for possible infectious colitis. Blood pressure improved and within normal limits, and vitals remained stable.  Blood cultures are ordered and are pending.    I suspect patient's presentation are related to infectious colitis vs. GI bleed.  Patient would benefit from a hospital admission for continued Protonix given guaiac positive stool, IV fluids, and consideration of further evaluation by GI for possible upper GI bleed and echo or other cardiac work-up given history and current symptoms. Due to requirement of further workup patient does not qualify for ED obs. Patient is discussed with the inpatient medicine team.  He will be admitted to [place]'s service under Dr. Clarke for further monitoring, evaluation, and further work-up.    I have reviewed the nursing notes.    I have reviewed the findings, diagnosis, plan and need for follow up with the patient.    Final diagnoses:   Infectious colitis   GI bleed   Lightheadedness       10/30/2019   [facility], EMERGENCY DEPARTMENT    Ginny Diaz MD PGY-1  [facility] - Family Medicine Res[facility]      Attending Attestation:  This data collected with the Resident working in the Emergency Department.  Patient was seen and evaluated by myself and I repeated the history and physical exam with the patient.  The plan of care was discussed with  them.  The key portions of the note including the entire assessment and plan reflect my documentation.    MD Isis Wood Ashley A, MD  11/08/19 1115

## 2019-10-30 NOTE — NURSING NOTE
Amos Walker's chief complaint for this visit includes:  Chief Complaint   Patient presents with     RECHECK     recheck left leg     PCP: Racheal Swift    Referring Provider:  No referring provider defined for this encounter.    BP (!) 67/43   Pulse 99   SpO2 98%   Data Unavailable     Do you need any medication refills at today's visit? NO

## 2019-10-30 NOTE — ED NOTES
Pt ambulatory to the bathroom with walker and standby assist. Pt had large loose, black BM. guaic positive. Pt denies dizziness at this time. Resident notified

## 2019-10-30 NOTE — ED TRIAGE NOTES
"Pt was at clinic today for evaluation of his right foot. Pt became really dizzy and was hypotensive. Pt reports he \"defacated 3 times before going to the clinic\". Pt reports it was unusual for him to go that many times, but stool was normal. Pt reports symptoms started after going to the bathroom. Pt denies CP or SOB  "

## 2019-10-31 ENCOUNTER — TELEPHONE (OUTPATIENT)
Dept: OPHTHALMOLOGY | Facility: CLINIC | Age: 74
End: 2019-10-31

## 2019-10-31 PROBLEM — K52.9 COLITIS PRESUMED INFECTIOUS: Status: ACTIVE | Noted: 2019-10-31

## 2019-10-31 PROBLEM — K62.5 BRIGHT RED BLOOD PER RECTUM: Status: ACTIVE | Noted: 2019-10-31

## 2019-10-31 PROBLEM — I95.9 HYPOTENSION, UNSPECIFIED HYPOTENSION TYPE: Status: ACTIVE | Noted: 2019-10-31

## 2019-10-31 PROBLEM — K92.1 HEMATOCHEZIA: Status: ACTIVE | Noted: 2019-10-31

## 2019-10-31 LAB
C COLI+JEJUNI+LARI FUSA STL QL NAA+PROBE: NOT DETECTED
C DIFF TOX B STL QL: NEGATIVE
EC STX1 GENE STL QL NAA+PROBE: NOT DETECTED
EC STX2 GENE STL QL NAA+PROBE: NOT DETECTED
ENTERIC PATHOGEN COMMENT: NORMAL
ERYTHROCYTE [DISTWIDTH] IN BLOOD BY AUTOMATED COUNT: 13.6 % (ref 10–15)
ERYTHROCYTE [DISTWIDTH] IN BLOOD BY AUTOMATED COUNT: 13.8 % (ref 10–15)
GLUCOSE BLDC GLUCOMTR-MCNC: 108 MG/DL (ref 70–99)
GLUCOSE BLDC GLUCOMTR-MCNC: 149 MG/DL (ref 70–99)
GLUCOSE BLDC GLUCOMTR-MCNC: 152 MG/DL (ref 70–99)
GLUCOSE BLDC GLUCOMTR-MCNC: 153 MG/DL (ref 70–99)
GLUCOSE BLDC GLUCOMTR-MCNC: 163 MG/DL (ref 70–99)
GLUCOSE BLDC GLUCOMTR-MCNC: 173 MG/DL (ref 70–99)
GLUCOSE BLDC GLUCOMTR-MCNC: 73 MG/DL (ref 70–99)
HCT VFR BLD AUTO: 29.7 % (ref 40–53)
HCT VFR BLD AUTO: 32.1 % (ref 40–53)
HGB BLD-MCNC: 10.5 G/DL (ref 13.3–17.7)
HGB BLD-MCNC: 9.4 G/DL (ref 13.3–17.7)
HGB BLD-MCNC: 9.4 G/DL (ref 13.3–17.7)
INTERPRETATION ECG - MUSE: NORMAL
MCH RBC QN AUTO: 31.4 PG (ref 26.5–33)
MCH RBC QN AUTO: 31.5 PG (ref 26.5–33)
MCHC RBC AUTO-ENTMCNC: 31.6 G/DL (ref 31.5–36.5)
MCHC RBC AUTO-ENTMCNC: 32.7 G/DL (ref 31.5–36.5)
MCV RBC AUTO: 96 FL (ref 78–100)
MCV RBC AUTO: 99 FL (ref 78–100)
NOROV GI+II ORF1-ORF2 JNC STL QL NAA+PR: NOT DETECTED
PLATELET # BLD AUTO: 108 10E9/L (ref 150–450)
PLATELET # BLD AUTO: 134 10E9/L (ref 150–450)
RBC # BLD AUTO: 2.99 10E12/L (ref 4.4–5.9)
RBC # BLD AUTO: 3.33 10E12/L (ref 4.4–5.9)
RVA NSP5 STL QL NAA+PROBE: NOT DETECTED
SALMONELLA SP RPOD STL QL NAA+PROBE: NOT DETECTED
SHIGELLA SP+EIEC IPAH STL QL NAA+PROBE: NOT DETECTED
SPECIMEN SOURCE: NORMAL
TROPONIN I SERPL-MCNC: <0.015 UG/L (ref 0–0.04)
V CHOL+PARA RFBL+TRKH+TNAA STL QL NAA+PR: NOT DETECTED
WBC # BLD AUTO: 7.6 10E9/L (ref 4–11)
WBC # BLD AUTO: 7.7 10E9/L (ref 4–11)
Y ENTERO RECN STL QL NAA+PROBE: NOT DETECTED

## 2019-10-31 PROCEDURE — 87506 IADNA-DNA/RNA PROBE TQ 6-11: CPT | Performed by: EMERGENCY MEDICINE

## 2019-10-31 PROCEDURE — 96361 HYDRATE IV INFUSION ADD-ON: CPT | Performed by: FAMILY MEDICINE

## 2019-10-31 PROCEDURE — 96367 TX/PROPH/DG ADDL SEQ IV INF: CPT | Performed by: FAMILY MEDICINE

## 2019-10-31 PROCEDURE — G0378 HOSPITAL OBSERVATION PER HR: HCPCS

## 2019-10-31 PROCEDURE — 25000128 H RX IP 250 OP 636: Performed by: STUDENT IN AN ORGANIZED HEALTH CARE EDUCATION/TRAINING PROGRAM

## 2019-10-31 PROCEDURE — 85018 HEMOGLOBIN: CPT | Mod: 91 | Performed by: STUDENT IN AN ORGANIZED HEALTH CARE EDUCATION/TRAINING PROGRAM

## 2019-10-31 PROCEDURE — 85027 COMPLETE CBC AUTOMATED: CPT | Performed by: STUDENT IN AN ORGANIZED HEALTH CARE EDUCATION/TRAINING PROGRAM

## 2019-10-31 PROCEDURE — 25000131 ZZH RX MED GY IP 250 OP 636 PS 637: Performed by: STUDENT IN AN ORGANIZED HEALTH CARE EDUCATION/TRAINING PROGRAM

## 2019-10-31 PROCEDURE — 25000132 ZZH RX MED GY IP 250 OP 250 PS 637: Performed by: STUDENT IN AN ORGANIZED HEALTH CARE EDUCATION/TRAINING PROGRAM

## 2019-10-31 PROCEDURE — 25800030 ZZH RX IP 258 OP 636: Performed by: STUDENT IN AN ORGANIZED HEALTH CARE EDUCATION/TRAINING PROGRAM

## 2019-10-31 PROCEDURE — 25000125 ZZHC RX 250: Performed by: STUDENT IN AN ORGANIZED HEALTH CARE EDUCATION/TRAINING PROGRAM

## 2019-10-31 PROCEDURE — 25800030 ZZH RX IP 258 OP 636: Performed by: EMERGENCY MEDICINE

## 2019-10-31 PROCEDURE — 87493 C DIFF AMPLIFIED PROBE: CPT | Performed by: EMERGENCY MEDICINE

## 2019-10-31 PROCEDURE — 36415 COLL VENOUS BLD VENIPUNCTURE: CPT | Performed by: STUDENT IN AN ORGANIZED HEALTH CARE EDUCATION/TRAINING PROGRAM

## 2019-10-31 PROCEDURE — C9113 INJ PANTOPRAZOLE SODIUM, VIA: HCPCS | Performed by: EMERGENCY MEDICINE

## 2019-10-31 PROCEDURE — 25000128 H RX IP 250 OP 636: Performed by: EMERGENCY MEDICINE

## 2019-10-31 PROCEDURE — 96366 THER/PROPH/DIAG IV INF ADDON: CPT | Performed by: FAMILY MEDICINE

## 2019-10-31 RX ORDER — DEXTROSE MONOHYDRATE 25 G/50ML
25-50 INJECTION, SOLUTION INTRAVENOUS
Status: DISCONTINUED | OUTPATIENT
Start: 2019-10-31 | End: 2019-10-31

## 2019-10-31 RX ORDER — OFLOXACIN 3 MG/ML
1 SOLUTION/ DROPS OPHTHALMIC 3 TIMES DAILY
Status: DISCONTINUED | OUTPATIENT
Start: 2019-10-31 | End: 2019-11-01 | Stop reason: HOSPADM

## 2019-10-31 RX ORDER — SIMVASTATIN 20 MG
40 TABLET ORAL AT BEDTIME
Status: DISCONTINUED | OUTPATIENT
Start: 2019-10-31 | End: 2019-11-01 | Stop reason: HOSPADM

## 2019-10-31 RX ORDER — PREDNISOLONE ACETATE 10 MG/ML
1 SUSPENSION/ DROPS OPHTHALMIC 4 TIMES DAILY
Status: DISCONTINUED | OUTPATIENT
Start: 2019-10-31 | End: 2019-11-01 | Stop reason: HOSPADM

## 2019-10-31 RX ORDER — INSULIN LISPRO 100 [IU]/ML
3 INJECTION, SOLUTION INTRAVENOUS; SUBCUTANEOUS ONCE
Status: DISCONTINUED | OUTPATIENT
Start: 2019-10-31 | End: 2019-10-31

## 2019-10-31 RX ORDER — ONDANSETRON 4 MG/1
4 TABLET, ORALLY DISINTEGRATING ORAL EVERY 6 HOURS PRN
Status: DISCONTINUED | OUTPATIENT
Start: 2019-10-31 | End: 2019-11-01 | Stop reason: HOSPADM

## 2019-10-31 RX ORDER — NICOTINE POLACRILEX 4 MG
15-30 LOZENGE BUCCAL
Status: DISCONTINUED | OUTPATIENT
Start: 2019-10-31 | End: 2019-11-01 | Stop reason: HOSPADM

## 2019-10-31 RX ORDER — ACETAMINOPHEN 325 MG/1
650 TABLET ORAL EVERY 4 HOURS PRN
Status: DISCONTINUED | OUTPATIENT
Start: 2019-10-31 | End: 2019-11-01 | Stop reason: HOSPADM

## 2019-10-31 RX ORDER — DEXTROSE MONOHYDRATE, SODIUM CHLORIDE, AND POTASSIUM CHLORIDE 50; 1.49; 4.5 G/1000ML; G/1000ML; G/1000ML
INJECTION, SOLUTION INTRAVENOUS CONTINUOUS
Status: DISCONTINUED | OUTPATIENT
Start: 2019-10-31 | End: 2019-10-31

## 2019-10-31 RX ORDER — AZITHROMYCIN 500 MG/1
500 TABLET, FILM COATED ORAL DAILY
Qty: 3 TABLET | Refills: 0 | Status: SHIPPED | OUTPATIENT
Start: 2019-10-31 | End: 2019-11-01

## 2019-10-31 RX ORDER — DEXTROSE MONOHYDRATE 25 G/50ML
25-50 INJECTION, SOLUTION INTRAVENOUS
Status: DISCONTINUED | OUTPATIENT
Start: 2019-10-31 | End: 2019-11-01 | Stop reason: HOSPADM

## 2019-10-31 RX ORDER — CLOPIDOGREL BISULFATE 75 MG/1
75 TABLET ORAL EVERY EVENING
Status: DISCONTINUED | OUTPATIENT
Start: 2019-10-31 | End: 2019-11-01 | Stop reason: HOSPADM

## 2019-10-31 RX ORDER — ONDANSETRON 2 MG/ML
4 INJECTION INTRAMUSCULAR; INTRAVENOUS EVERY 6 HOURS PRN
Status: DISCONTINUED | OUTPATIENT
Start: 2019-10-31 | End: 2019-11-01 | Stop reason: HOSPADM

## 2019-10-31 RX ORDER — NICOTINE POLACRILEX 4 MG
15-30 LOZENGE BUCCAL
Status: DISCONTINUED | OUTPATIENT
Start: 2019-10-31 | End: 2019-10-31

## 2019-10-31 RX ORDER — NALOXONE HYDROCHLORIDE 0.4 MG/ML
.1-.4 INJECTION, SOLUTION INTRAMUSCULAR; INTRAVENOUS; SUBCUTANEOUS
Status: DISCONTINUED | OUTPATIENT
Start: 2019-10-31 | End: 2019-11-01 | Stop reason: HOSPADM

## 2019-10-31 RX ORDER — ASPIRIN 81 MG/1
81 TABLET, CHEWABLE ORAL DAILY
Status: DISCONTINUED | OUTPATIENT
Start: 2019-10-31 | End: 2019-11-01 | Stop reason: HOSPADM

## 2019-10-31 RX ORDER — ACETAMINOPHEN 650 MG/1
650 SUPPOSITORY RECTAL EVERY 4 HOURS PRN
Status: DISCONTINUED | OUTPATIENT
Start: 2019-10-31 | End: 2019-11-01 | Stop reason: HOSPADM

## 2019-10-31 RX ORDER — AZITHROMYCIN 250 MG/1
500 TABLET, FILM COATED ORAL DAILY
Status: DISCONTINUED | OUTPATIENT
Start: 2019-10-31 | End: 2019-11-01 | Stop reason: HOSPADM

## 2019-10-31 RX ORDER — LIDOCAINE 40 MG/G
CREAM TOPICAL
Status: DISCONTINUED | OUTPATIENT
Start: 2019-10-31 | End: 2019-11-01 | Stop reason: HOSPADM

## 2019-10-31 RX ADMIN — SIMVASTATIN 40 MG: 20 TABLET, FILM COATED ORAL at 21:48

## 2019-10-31 RX ADMIN — CLOPIDOGREL BISULFATE 75 MG: 75 TABLET ORAL at 20:00

## 2019-10-31 RX ADMIN — ASPIRIN 81 MG CHEWABLE TABLET 81 MG: 81 TABLET CHEWABLE at 09:28

## 2019-10-31 RX ADMIN — AZITHROMYCIN 500 MG: 250 TABLET, FILM COATED ORAL at 11:01

## 2019-10-31 RX ADMIN — POTASSIUM CHLORIDE, DEXTROSE MONOHYDRATE AND SODIUM CHLORIDE: 150; 5; 450 INJECTION, SOLUTION INTRAVENOUS at 01:36

## 2019-10-31 RX ADMIN — INSULIN GLARGINE 5 UNITS: 100 INJECTION, SOLUTION SUBCUTANEOUS at 08:36

## 2019-10-31 RX ADMIN — PREDNISOLONE ACETATE 1 DROP: 10 SUSPENSION/ DROPS OPHTHALMIC at 12:09

## 2019-10-31 RX ADMIN — METRONIDAZOLE 500 MG: 500 INJECTION, SOLUTION INTRAVENOUS at 05:37

## 2019-10-31 RX ADMIN — PREDNISOLONE ACETATE 1 DROP: 10 SUSPENSION/ DROPS OPHTHALMIC at 08:36

## 2019-10-31 RX ADMIN — PREDNISOLONE ACETATE 1 DROP: 10 SUSPENSION/ DROPS OPHTHALMIC at 20:00

## 2019-10-31 RX ADMIN — OFLOXACIN 1 DROP: 3 SOLUTION/ DROPS OPHTHALMIC at 20:00

## 2019-10-31 RX ADMIN — POTASSIUM CHLORIDE, DEXTROSE MONOHYDRATE AND SODIUM CHLORIDE: 150; 5; 450 INJECTION, SOLUTION INTRAVENOUS at 13:05

## 2019-10-31 RX ADMIN — OFLOXACIN 1 DROP: 3 SOLUTION/ DROPS OPHTHALMIC at 08:36

## 2019-10-31 RX ADMIN — POTASSIUM CHLORIDE, DEXTROSE MONOHYDRATE AND SODIUM CHLORIDE: 150; 5; 450 INJECTION, SOLUTION INTRAVENOUS at 12:09

## 2019-10-31 RX ADMIN — PANTOPRAZOLE SODIUM 8 MG/HR: 40 INJECTION, POWDER, FOR SOLUTION INTRAVENOUS at 02:18

## 2019-10-31 RX ADMIN — OFLOXACIN 1 DROP: 3 SOLUTION/ DROPS OPHTHALMIC at 14:26

## 2019-10-31 RX ADMIN — PREDNISOLONE ACETATE 1 DROP: 10 SUSPENSION/ DROPS OPHTHALMIC at 15:57

## 2019-10-31 RX ADMIN — CIPROFLOXACIN 400 MG: 2 INJECTION, SOLUTION INTRAVENOUS at 08:36

## 2019-10-31 ASSESSMENT — MIFFLIN-ST. JEOR: SCORE: 1527.36

## 2019-10-31 NOTE — TELEPHONE ENCOUNTER
Pt hospitalized today with likely discharge tonight/tomorrow  Sudden decrease in blood pressure    Pt scheduled for cataract surgery Monday    Spoke to Dr. Purdy and nursing at hospital.  7B nursing will follow-up with hospitalist if would reviewed ok to medically for elective cataract surgery on Monday as planned-- if would comment in discharge instructions.    If ok for surgery on Monday would be able to evaluate both eyes during post-op care/logan Marin RN RN 2:51 PM 10/31/19        [facility] Call Center    Phone Message    May a detailed message be left on voicemail: no    Reason for Call: Other: .  pt wife would like to cancel pt appt since he is in the hospital and unsure of how/when he can complete his appt.    Action Taken: Message routed to:  [facility] ([facility] eye clinic

## 2019-10-31 NOTE — PLAN OF CARE
"/52 (BP Location: Left arm)   Pulse 82   Temp 96.7  F (35.9  C) (Oral)   Resp 16   Ht 1.88 m (6' 2\")   Wt 70.8 kg (156 lb)   SpO2 98%   BMI 20.03 kg/m      Observations Goals:    -Diagnostic tests and consults completed and resulted: Not yet met, pending GI consult recs and tests   -Vital signs normal or at patient baseline: Met  -Tolerating oral intake to maintain hydration: Met  -Tolerating oral antibiotics or has plans for home infusion setup Hgb stable: Met, on PO ABX    Nurse to notify provider when observation goals have been met and patient is ready for discharge.    A/Ox4, able to make needs known. VSS on RA. Tolerating reg diet, BG checks ACHS. 2 PIV- infusing IVM @ 100 mL/hr. Up with SBA + walker, no BMs this shift, voiding adequately. Denies pain and nausea. Continuing monitor stool for blood. Will continue to follow POC.   "

## 2019-10-31 NOTE — ED NOTES
"[facility] [place]   ED Nurse to Floor Handoff     Amos Walker is a 74 year old male who speaks English and lives with a spouse,  in a home  They arrived in the ED by ambulance from clinic.   lightheadedness and hypotension. Patient had 4 episodes of BM at home, another episode of podiatry clinic today. He states these appear normal for him, just increased number of episodes.  Here, he did have an episode of loose stool, which was dark.  He has not noticed any obvious bright red blood in her stool.  He is taking iron supplementation.  He also reports he has had lightheadedness with \"seeing spots\" since this morning, unrelated to position changes.  He does report a mild headache.  In podiatry clinic today, patient's measured blood pressure was 67/43, blood glucose 126. He otherwise feels well and denies any chest pain or shortness of breath. He denies any recent falls or injuries. He usually drinks primarily coffee. Since he was not feeling well this morning, he had 24 ounces of water at home, and another 8 ounces at the podiatry clinic. He has not had any recent systemic antibiotic use.  He recently had increase in his Lantus, but no other medication changes.  He denies any recent travel or major surgery.  He did have cataract surgery on 10/21/2019 without complications.      Otherwise denies any congestion, rhinorrhea, sore throat, hearing changes, heart palpitations, chest pain, shortness of breath, abdominal pain, changes in urination, or new unilateral leg pain or leg swelling.  He reports he inconsistently takes his blood pressures at home, with the most recent numbers from last week which was 120s/50s to 60s.  He reports he has had similar lightheadedness in the past, but never this frequently.    ED Chief Complaint: Dizziness    ED Dx;   Final diagnoses:   Infectious colitis   GI bleed   Lightheadedness         Needed?: No    Allergies:   Allergies   Allergen " Reactions     Lisinopril Other (See Comments)     dizziness     Neomycin Other (See Comments)     Wound gets worse     Methylchloroisothiazolinone [Methylisothiazolinone] Rash     Povidone Iodine Rash   .  Past Medical Hx:   Past Medical History:   Diagnosis Date     Anemia      CAD (coronary artery disease)     2V CAD involving LAD and RCA, s/p DESx4 in 3/18     CKD (chronic kidney disease) stage 3, GFR 30-59 ml/min (H)      Colon polyp      Emphysema of lung (H)     noted on CT     Heart disease      HTN (hypertension)      Hyperlipidemia      MRSA cellulitis of right foot     in past.      PAD (peripheral artery disease) (H) 09/2018    s/p R femoral enarterectomy and stenting      Tobacco use     50+ pack     Type 2 diabetes mellitus (H)     for 25 yrs.  on insulin and starlix     Venous ulcer (H)       Baseline Mental status: WDL  Current Mental Status changes: at basesline    Infection present or suspected this encounter: cultures pending  Sepsis suspected: No  Isolation type: Enteric     Activity level - Baseline/Home:  Stand with Assisted device   Activity Level - Current:   Stand with Assist    Bariatric equipment needed?: No    In the ED these meds were given:   Medications   0.9% sodium chloride BOLUS (0 mLs Intravenous Stopped 10/30/19 1821)     Followed by   sodium chloride 0.9% infusion (1,000 mLs Intravenous New Bag 10/30/19 1826)   pantoprazole (PROTONIX) 80 mg in sodium chloride 0.9 % 100 mL infusion (8 mg/hr Intravenous New Bag 10/30/19 1821)   metroNIDAZOLE (FLAGYL) infusion 500 mg (500 mg Intravenous New Bag 10/30/19 2112)   ciprofloxacin (CIPRO) infusion 400 mg (has no administration in time range)   pantoprazole (PROTONIX) 40 mg IV push injection (80 mg Intravenous Given 10/30/19 1813)   iopamidol (ISOVUE-370) solution 95 mL (95 mLs Intravenous Given 10/30/19 1848)   sodium chloride (PF) 0.9% PF flush 74 mL (74 mLs Intravenous Given 10/30/19 1848)       Drips running?  Yes    Home pump   No    Current LDAs  Peripheral IV 10/30/19 Left Lower forearm (Active)   Number of days: 0       Peripheral IV 10/30/19 Left Upper forearm (Active)   Site Assessment WDL 10/30/2019  9:07 PM   Line Status Saline locked 10/30/2019  9:07 PM   Phlebitis Scale 0-->no symptoms 10/30/2019  9:07 PM   Infiltration Scale 0 10/30/2019  9:07 PM   Infiltration Site Treatment Method  None 10/30/2019  9:07 PM   Extravasation? No 10/30/2019  9:07 PM   Number of days: 0       Wound 08/25/17 Right Leg Ulceration (Active)   Number of days: 796       Incision/Surgical Site 08/27/17 Left Hip (Active)   Number of days: 794       Incision/Surgical Site 09/14/18 Right Groin (Active)   Number of days: 411       Incision/Surgical Site 10/21/19 Left Eye (Active)   Number of days: 9       Labs results:   Labs Ordered and Resulted from Time of ED Arrival Up to the Time of Departure from the ED   CBC WITH PLATELETS DIFFERENTIAL - Abnormal; Notable for the following components:       Result Value    WBC 14.2 (*)     RBC Count 3.89 (*)     Hemoglobin 12.2 (*)     Hematocrit 38.3 (*)     Absolute Neutrophil 12.1 (*)     All other components within normal limits   COMPREHENSIVE METABOLIC PANEL - Abnormal; Notable for the following components:    Sodium 131 (*)     Glucose 122 (*)     Urea Nitrogen 39 (*)     Creatinine 1.35 (*)     GFR Estimate 52 (*)     GFR Estimate If Black 60 (*)     All other components within normal limits   INR - Abnormal; Notable for the following components:    INR 1.20 (*)     All other components within normal limits   ROUTINE UA WITH MICROSCOPIC - Abnormal; Notable for the following components:    Ketones Urine 5 (*)     Protein Albumin Urine 30 (*)     Leukocyte Esterase Urine Small (*)     Mucous Urine Present (*)     sperm Present (*)     Hyaline Casts 46 (*)     All other components within normal limits   TROPONIN I   PARTIAL THROMBOPLASTIN TIME   LACTIC ACID WHOLE BLOOD   ORTHOSTATIC BLOOD PRESSURE AND PULSE    ABO/RH TYPE AND SCREEN   BLOOD CULTURE   BLOOD CULTURE   ENTERIC BACTERIA AND VIRUS PANEL BY JOHN STOOL   CLOSTRIDIUM DIFFICILE TOXIN B       Imaging Studies:   Recent Results (from the past 24 hour(s))   XR Chest 2 Views    Narrative    Exam: XR CHEST 2 VW, 10/30/2019 6:05 PM    Indication: light headedness, cardiac history, eval for pathology    Comparison: Chest CT 5/18/2018. Radiograph 8/25/2017.    Findings:   PA and lateral views the chest. Lungs are hyperexpanded. Cardiac  silhouette is normal. Pulmonary vasculature is distinct. No focal  opacity, pleural effusion, or pneumothorax. Aortic arch  calcifications. The visualized upper abdomen is unremarkable.      Impression    Impression: Hyperexpanded lungs without focal airspace disease.    I have personally reviewed the examination and initial interpretation  and I agree with the findings.    REBA SPENCE MD   CT Abdomen Pelvis w Contrast   Result Value    Radiologist flags Infectious versus ischemic colitis (Urgent)    Narrative    EXAMINATION: CT abdomen/pelvis with contrast, 10/30/2019.    INDICATION: elevated WBC, diarrhea, light headedness, hypotension  early, eval for appendicitis, diverticulitis, etc..    COMPARISON: None.    TECHNIQUE: Routine images from the level of the diaphragm through the  pelvis were obtained with contrast.     Contrast: iopamidol (ISOVUE-370) solution 95 mL    Total DLP: 1311 mGy*cm.    FINDINGS:    Number parenchyma is largest. No focal hepatic lesion. Gallbladder  wall is mildly thickened and contrast enhancing. Calcified gallstone  visualized. No intrahepatic or extra hepatic biliary duct dilation. No  pericholecystic edema. The spleen is grossly unremarkable. Several  calcifications in the pancreatic tail and body with adjacent mild  hypodensity likely represent sequela of previous pancreatitis.  Bilateral adrenal glands are unremarkable.    Bilaterally symmetric appearing kidneys. Small subcentimeter  hypodensity  in the medial mid kidney which is too small characterize  however likely represent simple cysts. No appreciable renal lesion. No  hydronephrosis. No hydroureter. The bladder is mildly distended  without appreciable lateral wall irregularity. The prostate is mildly  enlarged. The pelvis is obscured by streak artifact from left hip  replacement. No appreciable pelvic masses. There are several  peripherally calcified lesions noted in the pelvis.    There is significant bowel wall thickening and contrast enhancement  noted at the splenic flexure and aspects of the transverse colon. The  large bowel is nondistended. The appendix is unremarkable. Multiple  loops of nondistended small bowel. Small bowel is incompletely  visualized secondary to streak artifact in the pelvis no appreciable  significant bowel wall thickening or contrast enhancement. Stomach is  grossly unremarkable.    Significant atherosclerotic calcification of the abdominal aorta. No  appreciable aneurysmal dilation. Significant calcification and  stenosis of the iliac arteries. No appreciable intra-abdominal  abdominal lymphadenopathy.    Increased centrilobular and diffuse paraseptal emphysematous changes.  No focal airspace opacity. No pleural effusion. No pneumothorax. No  suspicious pulmonary nodules.    Postsurgical changes of left hip replacement. No acute osseous  abnormalities. Soft tissues are grossly unremarkable. Mild-to-moderate  degenerative changes of the thoracolumbar spine.      Impression    IMPRESSION:    1. Contrast-enhanced the bowel wall thickening of the splenic flexure  and descending colon. Concerning for infectious versus ischemic  colitis.  2. Significant atherosclerotic calcifications of the bilateral iliac  arteries  3. Cholelithiasis without evidence of cholecystitis.  4. Central lobar and paraseptal emphysematous changes in the lungs.     [Urgent Result: Infectious versus ischemic colitis]    Finding was identified on  10/30/2019 7:18 PM.     Dr. Marinelli was contacted by Dr. James at 10/30/2019 7:56 PM and  verbalized understanding of the urgent finding.     I have personally reviewed the examination and initial interpretation  and I agree with the findings.    REBA SPENCE MD   Head CT w/o contrast    Narrative    CT HEAD W/O CONTRAST 10/30/2019 7:15 PM    History: on aspirin and plavix, light headedness, headache, eval for  bleed  ICD-10:  Comparison: None.    Technique: Using multidetector thin collimation helical acquisition  technique, axial, coronal and sagittal CT images from the skull base  to the vertex were obtained without intravenous contrast.     Findings:    On the noncontrast images of the brain, there is no definite  intracranial hemorrhage, mass affect, or midline shift. The ventricles  are not enlarged. The gray to white matter differentiation of the  cerebral hemispheres is preserved. The basal cisterns are patent.  The visualized paranasal sinuses are clear. The mastoid air cells are  clear.       Impression    Impression: No acute intracranial hemorrhage. No acute intracranial  pathology.    LEA NELSON MD       Recent vital signs:   BP (!) 140/77   Pulse 96   Temp 98.2  F (36.8  C) (Oral)   Resp 16   SpO2 97%     Shayan Coma Scale Score: 15 (10/30/19 1630)       Cardiac Rhythm: Normal Sinus  Pt needs tele? No  Skin/wound Issues: Wound boot R foot     Code Status: Full Code    Pain control: fair    Nausea control: pt had none    Abnormal labs/tests/findings requiring intervention: see epic    Family present during ED course? No   Family Comments/Social Situation comments: N/A    Tasks needing completion:     Magalie Duran RN    [phone] [facility]

## 2019-10-31 NOTE — PROGRESS NOTES
[facility] Progress Note    Main Plans for Today   - Advance diet     Assessment & Plan   Amos Walker is a 74 year old male admitted on 10/30/2019. He has a history of CAD s/p PCI (3/2018) on Plavix and aspirin, PAD, CKD3, T2DM on insulin, chronic venous stasis foot ulcer and is admitted for likely infectious colitis and an episode of hypotension.     # Colitis  # Diarrhea, resolved  # + Stool Guiac  # Leukocytosis, resolved  CT Abd Pelvis with colitis at splenic flexure and descending colon concerning for infection vs ischemic. VSS and benign abdominal exam. Low suspicion for ischemic colitis with unremarkable exam, neg lactate, and no abdominal pain. Infectious most likely with leukocytosis 14k with L shift on admission, WBC improved to 7.6. No h/o GI ulcer or bleed. Positive stool guaiac in ER, s/p 40 Protonix and protonix gtt in ER, and 1L NS bolus, low concern for active GI bleed given hemoglobin above baseline and no tachycardia. Patient is active smoker with heavy coffee use and dual antiplatelet therapy, but no EtOH or NSAID use. No recent antibiotic use or travel. Colonoscopy this year with 2 small polyps, negative colonoscopy and EGD 5 years ago.   - Transition from IV Cipro/flagyl to oral Azithromycin   - BCx drawn and pending  - Stool studies ordered  - Advance diet today, discontinue IVF  - strict I/O's  - no NSAIDs    Following rounds, nurse paged the team that patient had a 20 mL bright red bloody bowel movement. He continues to deny abdominal pain. His repeat Hb from 1045 was down to 9.4. We will make him NPO, restart IVF and consult GI for further recommendations. I also ordered a timed hemoglobin recheck for 1300.      # Lightheadedness  # Hypotension, improved  # H/o cardiac stents  Initial episode of lightheadedness and hypotension to 67/43 at podiatry office, now resolved. S/p 1L bolus and mIVF overnight. Normal EKG and neg  trop x2. CT head negative. PCI 3/2018 with deployment of DEX x3 (LAD, LM, mid/proximal and ostial RCA). On dual antiplatelet therapy with dark guaiac positive stools. Normotensive without tachycardia, negative orthostatics in ER. Reviewing clinic notes, he does have a history of HTN but it looks like they have been decreasing his lisinopril due to complaint of dizziness over the past year.  - repeat orthostatics  - Continue to hold PTA 5 mg lisinopril, would consider decreasing dose to 2.5 mg if his blood pressure remains persistently low once he recovers from this acute illness  - PTA aspirin 81 mg   - PTA simvastatin 40 mg  - PTA clopidogrel 75 mg     # H/o chronic Fe Deficient Anemia  Most recent Hgb 8.5 in July. Hgb 12.2 on admit, likely hemoconcentrated. On recheck Hgb trending down to 10.5, 9.4. He does take oral iron supplementation.   - Repeat Hb as above      #T2DM  PTA regimen 8 units lantus q AM, humalog 4/3/3 units with meals as well as Trulicity 1.5 mg once weekly. Of note, Lantus was recently increased to 8u over the past week, 6u in 10/4 PCP note. A1C 5.6 earlier this month. Patient denies significant hypoglycemic events. Blood sugar checked at podiatry office 10/30, 113 (of note, his Danny test sensor showed 143 at that time).  - Monitor BG  - decrease to 5 unit(s) lantus q AM  - humalog 4/3/3 units  - Moderate CHO diet     # Mild hyponatremia (Na 131)  Asymptomatic. Na 131 in 5/2019, so unlikely acute hypovolemic hyponatremia. Not on any diuretics.  - repeat BMP    # CKD3  Cr at baseline 1.35.     # PVD  # Foot ulcer  Seen in podiatry office day of admission, no concern for infection of the foot, ankle or leg.  - dual antiplatelet therapy as above  - statin as above     # Tobacco use disorder  Smokes 1/2 ppd.  - nicotine patch     # Cataracts  Upcoming cataract surgery next week. He does have an ophthalmology appointment today at 1445, he will try to reschedule.   - PTA prednisolone, oxofloxacin eye  drops    # Pain Assessment:  Current Pain Score 10/31/2019   Patient currently in pain? denies   Pain score (0-10) -   Pain location -   Pain descriptors -   Amos perera pain level was assessed and he currently denies pain.      Diet: Diet  NPO for Medical/Clinical Reasons Except for: Meds  Fluids: D5 1/2 NS @100 mL/hr   DVT Prophylaxis: Pneumatic Compression Devices  Code Status: Full Code    Disposition Plan   Expected discharge:Expected Discharge Date: 10/31/19 1-2 days, recommended to prior living arrangement once blood pressure stable and GI work up complete.Dispo: Expected Discharge Date: 10/31/19     Entered: Savannah Espinosa 10/31/2019, 8:27 AM       The patient's care was discussed with the Attending Physician, Dr. Pickard.    Savannah Espinosa  [facility] Medicine: PGY1  Pager: [phone]  Please see sticky note for cross cover information    Interval History   He remained NPO with IVF overnight. He notes he did eat a turkey sandwich just before midnight, which he tolerated well. He is feeling hungry this morning. He has not had any further bowel movements since admission, no nausea, vomiting or abdominal pain. He has not been up yet today so is unsure about feeling lightheaded. No signs of bleeding.     Physical Exam   Vital Signs: Temp: 98.3  F (36.8  C) Temp src: Oral BP: 119/51 Pulse: 82 Heart Rate: 84 Resp: 16 SpO2: 98 % O2 Device: None (Room air)    Weight: 156 lbs 0 oz  Physical Exam  Constitutional:       General: He is not in acute distress.     Appearance: He is not diaphoretic.      Comments: thin   Cardiovascular:      Rate and Rhythm: Normal rate.   Pulmonary:      Effort: Pulmonary effort is normal. No respiratory distress.   Abdominal:      General: Abdomen is flat. There is no distension.      Palpations: Abdomen is soft.      Tenderness: There is no tenderness. There is no rebound.   Musculoskeletal:      Comments: His right foot is in a pressure  boot, did not remove to examine his chronic ulcer as he was just seen by podiatry yesterday.   Neurological:      General: No focal deficit present.      Mental Status: He is alert.         Data   Recent Labs   Lab 10/30/19  2347 10/30/19  1620   WBC 7.6 14.2*   HGB 10.5* 12.2*   MCV 96 99   * 160   INR  --  1.20*   NA  --  131*   POTASSIUM  --  4.0   CHLORIDE  --  98   CO2  --  24   BUN  --  39*   CR  --  1.35*   ANIONGAP  --  9   CLAUDIA  --  8.5   GLC  --  122*   ALBUMIN  --  3.6   PROTTOTAL  --  7.5   BILITOTAL  --  0.7   ALKPHOS  --  128   ALT  --  16   AST  --  20   TROPI <0.015 <0.015
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
## 2019-10-31 NOTE — PLAN OF CARE
VSS. BP stable.  GI/: voiding, had bloody BM today  Diet: tolerated breakfast, NPO for GI consult  Incisions/Drains: R foot wound in boot, pt refused assessment  IV: PIV infusing IVMF at 100  Activity: ambulated in [place] with walker, no dizziness  PRN meds: denies pain  Adv diet this brian  Eye gtts in bin for L eye

## 2019-10-31 NOTE — PLAN OF CARE
"0100-0730    ..BP 94/49 (BP Location: Left arm)   Pulse 82   Temp 99.7  F (37.6  C) (Oral)   Resp 16   Ht 1.88 m (6' 2\")   Wt 70.8 kg (156 lb)   SpO2 98%   BMI 20.03 kg/m      Pt admitted at 0100 under [facility] with infection colitis, dizziness, hypo-tension   ..Activity: rested in bed   Neuros: alert and orientated x 4, calm and cooperative   Cardiac: BP 's/40-60's, HR 70-80's   Respiratory: O2 sats 98% on RA, unlabored   GI/: abdomen soft/non-tender, no BM since arrival to floor, voiding spont (adequate amounts)   Diet: NPO  Skin: right foot wound dressing CDI, dressing changed in clinic on 10/30, otherwise skin is intact   Lines: PIV x 2   Incisions/Drains: none   Labs: pending   Pain/nausea: denies pain, no nausea   New changes this shift: none   Plan: continue POC   Observation goals      PRIOR TO DISCHARGE     Comments: -diagnostic tests and consults completed and resulted - pending   -vital signs normal or at patient baseline -yes   -tolerating oral intake to maintain hydration-no (pending)   -tolerating oral antibiotics or has plans for home infusion setup -pending   Hgb stable - pending   Nurse to notify provider when observation goals have been met and patient is ready for discharge.       "

## 2019-10-31 NOTE — PROGRESS NOTES
Observation goals     PRIOR TO DISCHARGE     Comments: -diagnostic tests and consults completed and resulted - pending   -vital signs normal or at patient baseline -yes   -tolerating oral intake to maintain hydration-no (pending)   -tolerating oral antibiotics or has plans for home infusion setup -pending   Hgb stable - pending   Nurse to notify provider when observation goals have been met and patient is ready for discharge.

## 2019-10-31 NOTE — DISCHARGE SUMMARY
[facility] Discharge Summary- Joe  Service    Date of Admission:  10/30/2019  Date of Service: 10/31/2019  Date of Discharge: 10/31/2019  Discharging Attending Provider: Melly  Discharge Team: Joe    Discharge Diagnoses   Colitis, likely ischemic  Hypotension, resolved  Leukocytosis, resolved  Bright red blood per rectum    Follow-ups Needed After Discharge   - Follow up with PCP within one week for hospital follow up and hemoglobin recheck  - Colonoscopy (referral provided)    Hospital Course   Aoms Walker is a 74 year old male smoker with a history of CAD s/p PCI (3/2018) on Plavix and aspirin, PAD, CKD3, T2DM on insulin, chronic venous stasis foot ulcer and was admitted on 10/30 following a hypotensive event, found to have colitis. The following problems were addressed during his hospitalization:    # Colitis, likely ischemic  # Diarrhea, resolved  # Bright right blood per rectum  # Leukocytosis, resolved  CT Abd Pelvis with colitis at splenic flexure and descending colon concerning for ischemic versus infectious colitis. VSS, benign abdominal exam, normal lactate, and no abdominal pain. He had leukocytosis 14k with L shift on admission, improvement in symptoms with antibiotics (initially IV Cipro/Flagyl) and WBC now normalized. Blood cultures were drawn 10/30 and have shown no growth to date. Stool studies were sent 10/31 and are still pending at time of discharge. He was discharged on oral cefdinir to complete a 5 day course per [facility] guidelines.     He initially received Protonix gtt in ER, low concern for significant active GI bleed given hemoglobin above baseline and no tachycardia. Patient is active smoker with heavy coffee use and dual antiplatelet therapy, but no EtOH or NSAID use. No h/o GI ulcer or bleed. No recent antibiotic use or travel. Colonoscopy this year with multiple polyps removed (one tubular adenoma), no evidence of  diverticulosis. Negative colonoscopy and EGD 5 years ago.      Were intending to discharge patient, however, had a witnessed episode of BRBPR 10/31 AM, concerning for lower GI bleed. GI consulted and felt episode most consistent with acute ischemic colitis. Patient was observed overnight and did not have any further hematochezia. Hemoglobin 9.5 on day of discharge. He tolerated advancement of his diet. GI recommended follow up colonoscopy as outpatient.      # Lightheadedness  # Hypotension, improved  # H/o cardiac drug eluting stents 3/2018  Initial episode of lightheadedness and hypotension to 67/43 mmHg at podiatry office, now resolved. S/p 1L bolus and mIVF overnight. Normal EKG and neg trop x2. CT head negative. PCI 3/2018 with deployment of DEX x4 (LAD, LM, mid/proximal and ostial RCA). On dual antiplatelet therapy. Normotensive without tachycardia, negative orthostatics in ER. Reviewing clinic notes, he does have a history of HTN but it looks like PCP has been decreasing his lisinopril due to complaint of dizziness over the past year. Recommended he hold his lisinopril dose and follow up with his PCP within 1 week to consider restarting. May consider decreasing dose to 2.5 mg if his blood pressure remains persistently low once he recovers from this acute illness. He remains on his PTA aspirin 81 mg, simvastatin 40 mg, clopidogrel 75 mg. He is scheduled for cataract surgery on 11/04/19, medically stable for this procedure as he has remained normotensive since admission (see documentation below).      # H/o chronic Fe Deficient Anemia  Most recent Hgb 8.5 in July. Hgb 12.2 on admit, likely hemoconcentrated. On recheck Hgb trending down to 10.5, 9.4. He does take oral iron supplementation.        # T2DM  PTA regimen 8 units lantus q AM, humalog 4/3/3 units with meals as well as Trulicity 1.5 mg once weekly. Of note, Lantus was recently increased to 8u over the past week, 6u in 10/4 PCP note. A1C 5.6 earlier this  month. Patient denies significant hypoglycemic events. Blood sugar checked at podiatry office 10/30, 113 (of note, his Danny test sensor showed 143 at that time).      # Mild hyponatremia (Na 131), resolved  Asymptomatic. Na 131 in 5/2019, so unlikely acute hypovolemic hyponatremia. Not on any diuretics.     # CKD3, stable  Cr at baseline 1.35.     # PVD  # Foot ulcer  Seen in podiatry office day of admission, no concern for infection of the foot, ankle or leg. No changes made to his DAPT and statin as above.     # Tobacco use disorder  Smokes 1/2 ppd. He was offered a nicotine patch during his admission, declined.     # Cataracts  Upcoming cataract surgery next week. He had an ophthalmology appointment yesterday which he missed due to hospitalization. Continued his PTA prednisolone, oxofloxacin eye drops.     ---------------------------------------------------------------------------------  ELECTIVE CATARACT SURGERY  Cardiovascular Risk:  -Patient is able to perform ADL's without assistance without chest pain.  -The patient does not have chest pain at rest.  -Patient does not have a history of congestive heart failure.    -The patient does not have a history of stroke and does not have a history of valvular disease.    Pulmonary Risk:  -In terms of risk factors for pulmonary complication, the patient is older then 60    Perioperative Complications:  -The patient does not have a history of bleeding or clotting problems in the past.    -The patient has not had surgery previously.    -The patient does not have a family history of any anesthesia or surgical complications.      The proposed surgical procedure is considered LOW risk.    For above listed surgery and anesthesia:   Patient is at LOW risk for surgery/procedure and perioperative/procedure complications.  ------------------------------------------------------------------------------------    # Discharge Pain Plan:   - Patient currently has NO PAIN and is not  being prescribed pain medications on discharge.    Consultations This Hospital Stay   GI LUMINAL ADULT IP CONSULT    Code Status   Full Code       The patient was discussed with Dr. Pickard.    [facility]  [facility]   Pager:[phone]_  ___________________________________________________________________  Review of Systems:  CONSTITUTIONAL: NEGATIVE for fever, chills  INTEGUMENTARY/SKIN: NEGATIVE for worrisome rashes, moles or lesions  EYES: NEGATIVE for vision changes or irritation  ENT/MOUTH: NEGATIVE for ear, mouth and throat problems  RESP: NEGATIVE for significant cough or SOB  BREAST: NEGATIVE for masses, tenderness or discharge  CV: NEGATIVE for chest pain, palpitations or peripheral edema  GI: POSITIVE for increased number of bowel movements. NEGATIVE for nausea, abdominal pain, heartburn  : NEGATIVE for frequency, dysuria, or hematuria  MUSCULOSKELETAL: NEGATIVE for significant arthralgias or myalgia  NEURO: NEGATIVE for weakness, dizziness or paresthesias  ENDOCRINE: NEGATIVE for temperature intolerance, skin/hair changes  HEME/ALLERGY: POSITIVE for BRBPR x1  PSYCHIATRIC: NEGATIVE for changes in mood or affect    Physical Exam   Vital Signs: Temp: 96.7  F (35.9  C) Temp src: Oral BP: 116/52 Pulse: 82 Heart Rate: 79 Resp: 16 SpO2: 98 % O2 Device: None (Room air)    Weight: 156 lbs 0 oz    General Appearance: Awake, alert, resting comfortably in bed.  Respiratory: No respiratory distress.   Cardiovascular: RRR.   GI: Abdomen is soft, nontender, nondistended. No rebound or guarding. He is thin.   MSK: His right foot is in a pressure boot, did not examine chronic ulcer (healing well looking at podiatry's notes and pictures yesterday).     Significant Results and Procedures   Most Recent 3 CBC's:  Recent Labs   Lab Test 10/31/19  1249 10/31/19  1048 10/30/19  2347 10/30/19  1620   WBC  --  7.7 7.6 14.2*   HGB 9.4* 9.4* 10.5* 12.2*   MCV  --   99 96 99   PLT  --  108* 134* 160     Most Recent 3 BMP's:  Recent Labs   Lab Test 10/30/19  1620 05/22/19  1459 03/27/19  0934   * 131* 138   POTASSIUM 4.0 4.9 4.6   CHLORIDE 98 101 106   CO2 24 25 26   BUN 39* 38* 27   CR 1.35* 1.25 1.16   ANIONGAP 9 5 6   CLAUDIA 8.5 8.6 8.6   * 235* 48*     Most Recent 3 INR's:  Recent Labs   Lab Test 10/30/19  1620 03/01/18  1300 02/27/18  1016   INR 1.20* 1.13 1.05       Pending Results   These results will be followed up by PCP.  Unresulted Labs Ordered in the Past 30 Days of this Admission     Date and Time Order Name Status Description    10/30/2019 2003 Blood culture Preliminary     10/30/2019 2003 Blood culture Preliminary              Primary Care Physician   Racheal Swift    Discharge Disposition   Discharged to home  Condition at discharge: Stable    Discharge Orders      Reason for your hospital stay    You were hospitalized because of low blood pressure and diarrhea.     Adult [facility] Follow-up and recommended labs and tests    Follow up with primary care provider, Racheal Swift, within 7 days for hospital follow- up.  The following labs/tests are recommended: repeat CBC.      Appointments on [place] and/or [facility] (with [facility] or [facility] provider or service). Call [phone] if you haven't heard regarding these appointments within 7 days of discharge.     Activity    Your activity upon discharge: activity as tolerated     When to contact your care team    Call your primary doctor if you have any of the following: evidence of bleeding, abdominal pain, fever.     Discharge Instructions     Full Code     Diet    Follow this diet upon discharge: Regular     Discharge Medications   Current Discharge Medication List      START taking these medications    Details   azithromycin (ZITHROMAX) 500 MG tablet Take 1 tablet (500 mg) by mouth daily for 3 days  Qty: 3 tablet, Refills: 0    Associated Diagnoses: Colitis presumed infectious          CONTINUE these medications which have NOT CHANGED    Details   ascorbic acid 500 MG TABS Take 1 tablet (500 mg) by mouth 2 times daily  Qty: 30 tablet    Associated Diagnoses: Ulcer of right lower leg, with fat layer exposed (H)      ASPIRIN PO Take 81 mg by mouth daily      clopidogrel (PLAVIX) 75 MG tablet Take 1 tablet (75 mg) by mouth every evening  Qty: 90 tablet, Refills: 3    Associated Diagnoses: Peripheral vascular disease, unspecified (H)      dulaglutide (TRULICITY) 1.5 MG/0.5ML pen Inject 1.5 mg Subcutaneous every 7 days  Qty: 2 mL, Refills: 2    Associated Diagnoses: Type 2 diabetes mellitus with diabetic peripheral angiopathy without gangrene, with long-term current use of insulin (H)      ferrous sulfate (IRON) 325 (65 FE) MG tablet Take 1 tablet (325 mg) by mouth 2 times daily  Qty: 60 tablet, Refills: 11    Associated Diagnoses: Peripheral vascular disease, unspecified (H)      lisinopril (PRINIVIL/ZESTRIL) 20 MG tablet Take 0.5 tablets (10 mg) by mouth daily  Qty: 90 tablet, Refills: 3    Associated Diagnoses: Benign essential hypertension      ofloxacin (OCUFLOX) 0.3 % ophthalmic solution Apply 1 drop to eye 3 times daily Instill into operative eye(s) per physician instructions.  Qty: 5 mL, Refills: 1    Associated Diagnoses: Nuclear senile cataract of both eyes      polyethylene glycol (MIRALAX/GLYCOLAX) powder Take 17 g (1 capful) by mouth daily  Qty: 255 g, Refills: 1    Associated Diagnoses: Constipation, unspecified constipation type      simvastatin (ZOCOR) 40 MG tablet Take 1 tablet (40 mg) by mouth At Bedtime  Qty: 90 tablet, Refills: 3    Associated Diagnoses: Type 2 diabetes, controlled, with neuropathy (H); PVD (peripheral vascular disease) (H); Coronary artery disease due to lipid rich plaque      ammonium lactate (LAC-HYDRIN) 12 % cream Apply topically 2 times daily as needed for dry skin  Qty: 385 g, Refills: 3    Associated Diagnoses: Venous stasis; Type 2 diabetes, controlled,  "with neuropathy (H)      blood glucose monitoring (FREESTYLE) lancets Use to test blood sugars 4 times daily as directed.  Qty: 100 each, Refills: 11    Associated Diagnoses: Type 2 diabetes, uncontrolled, with neuropathy (H)      Blood Pressure KIT 1 Device daily  Qty: 1 kit, Refills: 0    Associated Diagnoses: Benign essential hypertension      Continuous Blood Gluc  (FREESTYLE LATOYA 14 DAY READER) TANIA 1 Device every hour as needed (every hour prn)  Qty: 1 Device, Refills: 0    Associated Diagnoses: Type 2 diabetes mellitus with diabetic peripheral angiopathy without gangrene, with long-term current use of insulin (H)      continuous blood glucose monitoring (FREESTYLE LATOYA) sensor For use with Freestyle Latoya Flash  for continuous monitioring of blood glucose levels. Replace sensor every 14 days.  Qty: 2 each, Refills: 0    Associated Diagnoses: Type 2 diabetes mellitus with diabetic peripheral angiopathy without gangrene, with long-term current use of insulin (H)      Gauze Pads & Dressings (OPTIFOAM) 6\"X6\" PADS 1 Box once a week  Qty: 1 each, Refills: 6    Associated Diagnoses: Ulcer of right leg, with fat layer exposed (H)      gentamicin (GARAMYCIN) 0.1 % external cream Apply to left foot and leg ulcers.  Qty: 30 g, Refills: 5    Associated Diagnoses: Ulcer of right lower leg, with fat layer exposed (H); Chronic venous hypertension with ulcer involving right side; Type 2 diabetes, controlled, with neuropathy (H)      insulin glargine (LANTUS PEN) 100 UNIT/ML pen Inject 4 Units Subcutaneous At Bedtime Increase by 2 units until goal sugar of 100-130 reached, max 10 units.  Qty: 3 mL, Refills: 3    Comments: If Lantus is not covered by insurance, may substitute Basaglar at same dose and frequency.    Associated Diagnoses: Type 2 diabetes, controlled, with neuropathy (H)      insulin lispro (HUMALOG PEN) 100 UNIT/ML pen 4 UNITS WITH BREAKFAST, 3 UNITS WITH LUNCH AND DINNER  Qty: 3 mL, Refills: 3 "    Associated Diagnoses: Diabetes mellitus with peripheral vascular disease (H)      insulin pen needle (B-D U/F) 31G X 8 MM miscellaneous USE FOUR DAILY AS DIRECTED  Qty: 100 each, Refills: 11      ketoconazole (NIZORAL) 2 % external shampoo Apply topically twice a week Leave on for 3-5 min then rinse off; after 2-4 weeks use only once weekly  Qty: 120 mL, Refills: 3    Associated Diagnoses: Seborrheic dermatitis of scalp      ketorolac tromethamine (ACULAR-LS) 0.4 % SOLN ophthalmic solution Apply 1 drop to eye 4 times daily Instill into operative eye(s) per physician instructions.  Qty: 5 mL, Refills: 0    Associated Diagnoses: Nuclear senile cataract of both eyes      ONETOUCH ULTRA test strip TEST TWICE DAILY AS DIRECTED  Qty: 200 strip, Refills: 3    Associated Diagnoses: Type 2 diabetes mellitus (H)      !! order for DME Equipment being ordered: Roll a bout knee scooter  Qty: 1 Device, Refills: 0    Associated Diagnoses: Charcot's joint of foot, right      !! order for DME Please measure and distribute 1 pair of 30mmHg - 40mmHg knee high open toe ulcercare compression stockings. Jobst ultrasheer or equivalent.  Qty: 2 each, Refills: 6    Associated Diagnoses: Varicose veins of bilateral lower extremities with other complications      prednisoLONE acetate (PRED FORTE) 1 % ophthalmic suspension Apply 1 drop to eye 4 times daily Instill into operative eye(s) per physician instructions.  Qty: 5 mL, Refills: 1    Associated Diagnoses: Nuclear senile cataract of both eyes      silver sulfADIAZINE (SILVADENE) 1 % external cream Apply topically daily To affected areas on right foot and leg.  Qty: 85 g, Refills: 5    Associated Diagnoses: Ulcer of right lower leg, with fat layer exposed (H); Chronic venous hypertension (idiopathic) with ulcer of right lower extremity (CODE) (H); Type 2 diabetes, controlled, with neuropathy (H)      triamcinolone (KENALOG) 0.1 % external cream Apply sparingly to left heel daily.  Qty:  60 g, Refills: 1    Associated Diagnoses: Dermatitis of left foot      VITAMIN D, CHOLECALCIFEROL, PO Take 1,000 Units by mouth 2 times daily       !! - Potential duplicate medications found. Please discuss with provider.        Allergies   Allergies   Allergen Reactions     Lisinopril Other (See Comments)     dizziness     Neomycin Other (See Comments)     Wound gets worse     Methylchloroisothiazolinone [Methylisothiazolinone] Rash     Povidone Iodine Rash

## 2019-11-01 ENCOUNTER — ANESTHESIA EVENT (OUTPATIENT)
Dept: SURGERY | Facility: AMBULATORY SURGERY CENTER | Age: 74
End: 2019-11-01

## 2019-11-01 VITALS
WEIGHT: 156 LBS | BODY MASS INDEX: 20.02 KG/M2 | RESPIRATION RATE: 18 BRPM | HEART RATE: 78 BPM | DIASTOLIC BLOOD PRESSURE: 57 MMHG | HEIGHT: 74 IN | SYSTOLIC BLOOD PRESSURE: 135 MMHG | TEMPERATURE: 97.9 F | OXYGEN SATURATION: 98 %

## 2019-11-01 LAB
ANION GAP SERPL CALCULATED.3IONS-SCNC: 4 MMOL/L (ref 3–14)
BUN SERPL-MCNC: 27 MG/DL (ref 7–30)
CALCIUM SERPL-MCNC: 8.1 MG/DL (ref 8.5–10.1)
CHLORIDE SERPL-SCNC: 109 MMOL/L (ref 94–109)
CO2 SERPL-SCNC: 24 MMOL/L (ref 20–32)
CREAT SERPL-MCNC: 1.12 MG/DL (ref 0.66–1.25)
ERYTHROCYTE [DISTWIDTH] IN BLOOD BY AUTOMATED COUNT: 14 % (ref 10–15)
GFR SERPL CREATININE-BSD FRML MDRD: 65 ML/MIN/{1.73_M2}
GLUCOSE BLDC GLUCOMTR-MCNC: 118 MG/DL (ref 70–99)
GLUCOSE BLDC GLUCOMTR-MCNC: 127 MG/DL (ref 70–99)
GLUCOSE BLDC GLUCOMTR-MCNC: 189 MG/DL (ref 70–99)
GLUCOSE SERPL-MCNC: 131 MG/DL (ref 70–99)
HCT VFR BLD AUTO: 30 % (ref 40–53)
HGB BLD-MCNC: 9.5 G/DL (ref 13.3–17.7)
MCH RBC QN AUTO: 31.1 PG (ref 26.5–33)
MCHC RBC AUTO-ENTMCNC: 31.7 G/DL (ref 31.5–36.5)
MCV RBC AUTO: 98 FL (ref 78–100)
PLATELET # BLD AUTO: 122 10E9/L (ref 150–450)
POTASSIUM SERPL-SCNC: 4 MMOL/L (ref 3.4–5.3)
RBC # BLD AUTO: 3.05 10E12/L (ref 4.4–5.9)
SODIUM SERPL-SCNC: 137 MMOL/L (ref 133–144)
WBC # BLD AUTO: 7.9 10E9/L (ref 4–11)

## 2019-11-01 PROCEDURE — 25000132 ZZH RX MED GY IP 250 OP 250 PS 637: Performed by: STUDENT IN AN ORGANIZED HEALTH CARE EDUCATION/TRAINING PROGRAM

## 2019-11-01 PROCEDURE — 36415 COLL VENOUS BLD VENIPUNCTURE: CPT | Performed by: STUDENT IN AN ORGANIZED HEALTH CARE EDUCATION/TRAINING PROGRAM

## 2019-11-01 PROCEDURE — 85027 COMPLETE CBC AUTOMATED: CPT | Performed by: STUDENT IN AN ORGANIZED HEALTH CARE EDUCATION/TRAINING PROGRAM

## 2019-11-01 PROCEDURE — 80048 BASIC METABOLIC PNL TOTAL CA: CPT | Performed by: STUDENT IN AN ORGANIZED HEALTH CARE EDUCATION/TRAINING PROGRAM

## 2019-11-01 PROCEDURE — G0378 HOSPITAL OBSERVATION PER HR: HCPCS

## 2019-11-01 RX ORDER — CEFDINIR 300 MG/1
300 CAPSULE ORAL 2 TIMES DAILY
Qty: 6 CAPSULE | Refills: 0 | Status: SHIPPED | OUTPATIENT
Start: 2019-11-01 | End: 2020-01-29

## 2019-11-01 RX ORDER — AZITHROMYCIN 500 MG/1
500 TABLET, FILM COATED ORAL DAILY
Qty: 1 TABLET | Refills: 0 | Status: SHIPPED | OUTPATIENT
Start: 2019-11-01 | End: 2019-11-01

## 2019-11-01 RX ADMIN — AZITHROMYCIN 500 MG: 250 TABLET, FILM COATED ORAL at 08:47

## 2019-11-01 RX ADMIN — PREDNISOLONE ACETATE 1 DROP: 10 SUSPENSION/ DROPS OPHTHALMIC at 08:46

## 2019-11-01 RX ADMIN — INSULIN GLARGINE 5 UNITS: 100 INJECTION, SOLUTION SUBCUTANEOUS at 08:47

## 2019-11-01 RX ADMIN — OFLOXACIN 1 DROP: 3 SOLUTION/ DROPS OPHTHALMIC at 08:46

## 2019-11-01 RX ADMIN — ASPIRIN 81 MG CHEWABLE TABLET 81 MG: 81 TABLET CHEWABLE at 08:46

## 2019-11-01 NOTE — PROGRESS NOTES
"Gastroenterology Follow up Note         Subjective/Objective:   - Patient reports feeling well this AM  - Denies fevers or chills  - Denies nausea/emesis, ordered his breakfast but it hasn't arrived yet  - Denies any abdominal pain or any further hematochezia.         Medications:     Current Facility-Administered Medications   Medication     acetaminophen (TYLENOL) Suppository 650 mg     acetaminophen (TYLENOL) tablet 650 mg     aspirin (ASA) chewable tablet 81 mg     azithromycin (ZITHROMAX) tablet 500 mg     clopidogrel (PLAVIX) tablet 75 mg     glucose gel 15-30 g    Or     dextrose 50 % injection 25-50 mL    Or     glucagon injection 1 mg     insulin aspart (NovoLOG) inj (RAPID ACTING)     insulin aspart (NovoLOG) inj (RAPID ACTING)     insulin aspart (NovoLOG) inj (RAPID ACTING)     insulin aspart (NovoLOG) inj (RAPID ACTING)     insulin glargine (LANTUS PEN) injection 5 Units     lidocaine (LMX4) cream     lidocaine 1 % 0.1-1 mL     melatonin tablet 1 mg     naloxone (NARCAN) injection 0.1-0.4 mg     ofloxacin (OCUFLOX) 0.3 % ophthalmic solution 1 drop     ondansetron (ZOFRAN-ODT) ODT tab 4 mg    Or     ondansetron (ZOFRAN) injection 4 mg     prednisoLONE acetate (PRED FORTE) 1 % ophthalmic susp 1 drop     simvastatin (ZOCOR) tablet 40 mg     sodium chloride (PF) 0.9% PF flush 3 mL     sodium chloride (PF) 0.9% PF flush 3 mL            Physical Exam:   VS:  /52 (BP Location: Left arm)   Pulse 55   Temp 98  F (36.7  C) (Oral)   Resp 18   Ht 1.88 m (6' 2\")   Wt 70.8 kg (156 lb)   SpO2 99%   BMI 20.03 kg/m      Wt:   Wt Readings from Last 2 Encounters:   10/31/19 70.8 kg (156 lb)   10/21/19 69.9 kg (154 lb)      Constitutional: cooperative, pleasant, no acute distress  Eyes: Sclera anicteric/injected  CV: RRR, no m/r/g  Respiratory: CTAB  Abd: Nondistended, NABS.  Skin: warm, perfused, no jaundice  - Right foot in a pressure boot  Neuro: AAO x 3         Laboratory:   [facility]  Recent Labs   Lab " 11/01/19  0552 10/30/19  1620    131*   POTASSIUM 4.0 4.0   CHLORIDE 109 98   CLAUDIA 8.1* 8.5   CO2 24 24   BUN 27 39*   CR 1.12 1.35*   * 122*     CBC  Recent Labs   Lab 11/01/19  1052 10/31/19  1249 10/31/19  1048 10/30/19  2347 10/30/19  1620   WBC 7.9  --  7.7 7.6 14.2*   RBC 3.05*  --  2.99* 3.33* 3.89*   HGB 9.5* 9.4* 9.4* 10.5* 12.2*   HCT 30.0*  --  29.7* 32.1* 38.3*   MCV 98  --  99 96 99   MCH 31.1  --  31.4 31.5 31.4   MCHC 31.7  --  31.6 32.7 31.9   RDW 14.0  --  13.8 13.6 13.5   *  --  108* 134* 160     INR  Recent Labs   Lab 10/30/19  1620   INR 1.20*     LFTs  Recent Labs   Lab 10/30/19  1620   ALKPHOS 128   AST 20   ALT 16   BILITOTAL 0.7   PROTTOTAL 7.5   ALBUMIN 3.6      Enteric pathogen panel: negative  C. diff: negative    Labs above was independently reviewed and interpreted         Imaging/Procedures/Studies:     Results for orders placed or performed during the hospital encounter of 06/12/19   COLONOSCOPY   Result Value Ref Range    COLONOSCOPY       [facility]  [address] [place] [address] [phone]     Endoscopy Department  _______________________________________________________________________________  Patient Name: Amos Walker            Procedure Date: 6/12/2019 10:05 AM  MRN: [id number]                       Account Number: [id number]  YOB: 1945              Admit Type: Outpatient  Age: 74                                Gender: Male  Note Status: Finalized                Attending MD: Thomas Vu MD  Total Sedation Time: 71 minutes with 1:1 monitoring   _______________________________________________________________________________     Procedure:           Colonoscopy  Indications:         Therapeutic procedure for colon polyps  Providers:           Thomas Vu MD, Dieter Elliott MD, Alanis Magdaleno RN  Referring MD:        Racheal Siwft MD  Medicines:           Fentanyl 75 micrograms  IV, Midazolam 3 mg IV  Complications:       No immediat e complications.  _______________________________________________________________________________  Procedure:           Pre-Anesthesia Assessment:                       - Prior to the procedure, a History and Physical was                        performed, and patient medications and allergies were                        reviewed. The patient is competent. The risks and                        benefits of the procedure and the sedation options and                        risks were discussed with the patient. All questions                        were answered and informed consent was obtained. Patient                        identification and proposed procedure were verified by                        the physician in the procedure room. Mental Status                        Examination: alert and oriented. Airway Examination:                        normal oropharyngeal airway and neck mobility.                        Respiratory Examination: clear to auscultation. CV                         Examination: normal. Prophylactic Antibiotics: The                        patient does not require prophylactic antibiotics. Prior                        Anticoagulants: The patient has taken Plavix                        (clopidogrel). ASA Grade Assessment: II - A patient with                        mild systemic disease. After reviewing the risks and                        benefits, the patient was deemed in satisfactory                        condition to undergo the procedure. The anesthesia plan                        was to use moderate sedation / analgesia (conscious                        sedation). Immediately prior to administration of                        medications, the patient was re-assessed for adequacy to                        receive sedatives. The heart rate, respiratory rate,                        oxygen saturations, blood pressure, adequacy of                         pulmonary ventilation, and response to care were                        monitored throughout th e procedure. The physical status                        of the patient was re-assessed after the procedure.                       After obtaining informed consent, the colonoscope was                        passed under direct vision. Throughout the procedure,                        the patient's blood pressure, pulse, and oxygen                        saturations were monitored continuously. The Colonoscope                        was introduced through the anus and advanced to the                        cecum, identified by appendiceal orifice and ileocecal                        valve. The colonoscopy was performed without difficulty.                        The patient tolerated the procedure well. The quality of                        the bowel preparation was good. The endoscopy was                        advanced using the water exchange method ( underwater ).                        The right colon was also examined in retroflexion.                                                                                    Findings:       The terminal ileum appeared normal.       A 5 mm polyp was found in the cecum. The polyp was sessile. The polyp        was removed with a cold snare. The polyp was removed with a saline        injection-lift technique using a cold snare. Resection and retrieval        were complete.       A 6 mm polyp was found in the transverse colon. The polyp was sessile.        The polyp was removed with a hot snare. The polyp was removed with a        saline injection-lift technique using a hot snare. Resection and        retrieval were complete. To prevent bleeding after the polypectomy, one        hemostatic clip was successfully placed. There was no bleeding during,        or at the end, of the procedure.       A 5 mm polyp was found in the sigmoid colon. The polyp was sessile. The         polyp was removed with a hot snare. Resection and retrieval were        complete.       The exam was otherwise without abnormality on direct and retroflexion         views.                                                                                   Impression:          - The examined portion of the ileum was normal.                       - One 5 mm polyp in the cecum, removed with a cold snare                        and removed using injection-lift and a cold snare.                        Resected and retrieved.                       - One 6 mm polyp in the transverse colon, removed with a                        hot snare and removed using injection-lift and a hot                        snare. Resected and retrieved. Clip was placed.                       - One 5 mm polyp in the sigmoid colon, removed with a                        hot snare. Resected and retrieved.                       - The examination was otherwise normal on direct and                        retroflexion views.                       NOTE: the polyp reported as being in the descending                        colon in the prior colonoscopy report appears on images                         in the transverse colon; that is where we removed a                        likely SSA. The descending colon was inspected on                        multiple occasions and no polyps were identified.  Recommendation:      - Return to referring physician.                       - Repeat colonoscopy in 5 years for surveillance.                                                                                     ___________________  Thomas Vu MD  6/12/2019 12:15:24 PM  I was physically present for the entire viewing portion of the exam.  __________________________  Signature of teaching physician  [id number] Vu MD    __________________  Dieter Elliott MD  Number of Addenda: 0    Note Initiated On: 6/12/2019 10:05 AM       CT Abdomen Pelvis  10/30/2019:  1. Contrast-enhanced the bowel wall thickening of the splenic flexureand descending colon. Concerning for infectious versus ischemiccolitis.  2. Significant atherosclerotic calcifications of the bilateral iliacarteries  3. Cholelithiasis without evidence of cholecystitis.  4. Central lobar and paraseptal emphysematous changes in the lungs.   Imaging above was independently reviewed and interpreted         Assessment and Plan:   Amos Walker is a 74 year old male with PMHx of  CAD on Plavix and aspirin, PAD, CKD 3, DM 2 on insulin, and tobacco use, who was admitted on 10/30 after having multiple loose stools followed by hypotensive event, found to have CT evidence of bowel wall thickening of the splenic flexure and descending colon, concerning for infectious versus ischemic colitis.  Patient was initiated on Cipro/Flagyl with subsequent improvement in symptoms with plan for discharge 10/31.  However just prior to discharge, patient developed 20 mL of hematochezia (witnessed), prompting GI consult.  Last colonoscopy in 6/2019 w/ multiple polyps removed (one tubular adenoma), no diverticulosis. Suspect likely ischemic given history, though pt only notes mild bloating sensation. Less likely diverticular, IBD, or malignant etiology.    #. Watery diarrhea, acute, afebrile:   Concern for ischemic colitis given distribution of disease (splenic flexure/descending colon) and significant atherosclerotic calcifications noted on imaging. Patient with multiple risk factors, including diabetes, CKD, PAD, CAD and recent hypotension prior to admission. Of note, lactate wnl on admission. No recent NSAID use. DDx also includes infectious colitis, although initial infectious studies negative, including enteric pathogen panel and C. diff. No fevers or chills No recent NSAID use, travel or alcohol use. Moderate severity of disease given male gender, hypotension and Hgb < 12, thus indication for antibiotics.   Patient doing  well with resolution of diarrhea and no further bouts of hematochezia.   - In terms of antibiotics, recommend one of the following antimicrobial regimens: anti-anaerobic agent plus a fluoroquinolone or an aminoglycoside or a third-generation cephalosporin. Consider 5-7 day duration  - Recommend maintaining normotension  - Optimization of ischemic risk factors per primary team   - Recommend tobacco cessation, given increased RF for ischemic disease. Patient continues to smoke 10 cigarettes per day.    It has been a pleasure to participate in the care of this patient.  Patient discussed with GI staff, Dr. Muñoz.  Please do not hesitate to contact the GI service with any questions or concerns.     Gastroenterology Inpatient Sign Off Note    Inpatient GI consults service will sign off. No further recommendations at this time. If primary team has addition questions, please page the consult fellow listed in Memo.    Follow up recommendations:   No outpatient GI clinic follow-up indicated. Follow-up with primary care provider at timing determined by discharge physician.    If outpatient GI follow-up is felt indicated by primary care, they may coordinate this by placing new GI referral and contacting the general GI clinic - ([phone])    Candida Chung (Lizzie)  Gastroenterology Fellow  Pager [phone]

## 2019-11-01 NOTE — PLAN OF CARE
Alert and oriented x 4. Vital signs stable. On room air. Moves in room independently.  Denies pain.Good oral intake. Intervention/assessment: Reviewed discharge order with patient and spouse, both verbalized understanding of the plan and received a copy. Removed iv line. Discharged, pending wheelchair.transport service.

## 2019-11-01 NOTE — PLAN OF CARE
Observations Goals:     -Diagnostic tests and consults completed and resulted: Not yet met, pending GI consult recs and tests   -Vital signs normal or at patient baseline: Met  -Tolerating oral intake to maintain hydration: Met  -Tolerating oral antibiotics or has plans for home infusion setup Hgb stable: Met, on PO ABX

## 2019-11-01 NOTE — PLAN OF CARE
"/58 (BP Location: Left arm)   Pulse 82   Temp 98.1  F (36.7  C) (Oral)   Resp 16   Ht 1.88 m (6' 2\")   Wt 70.8 kg (156 lb)   SpO2 100%   BMI 20.03 kg/m      7.00-11.00  Observations Goals:     -Diagnostic tests and consults completed and resulted: Not yet met, pending GI consult recs and tests   -Vital signs normal or at patient baseline: Met  -Tolerating oral intake to maintain hydration: Met  -Tolerating oral antibiotics or has plans for home infusion setup Hgb stable: Met, on PO ABX     A/Ox4, able to make needs known. VSS on RA. Denies pain. PIV x2 Sl. +flatus. No BM this shift.  Up with SBA + walker. Right foot boot on (Charcot foot).Voiding adequately in urinal. BG was 149 at HS.Continuing monitor stool for blood.        "

## 2019-11-01 NOTE — PLAN OF CARE
Observations Goals:     -Diagnostic tests and consults completed and resulted: Not yet met, pending GI consult recs and tests   -Vital signs normal or at patient baseline: Met  -Tolerating oral intake to maintain hydration: Met  -Tolerating oral antibiotics or has plans for home infusion setup Hgb stable: Met, on PO ABX     A x O, VSS on RA. Denies pain and n/v. Right foot boot on. BG overnight, 127. Voiding adequately, urinal at bedside.  PIV x2 SL. +Flatus, No BM this shift. Up with SBA + walker. Contact precautions maintained. Continue POC.

## 2019-11-03 ENCOUNTER — HEALTH MAINTENANCE LETTER (OUTPATIENT)
Age: 74
End: 2019-11-03

## 2019-11-04 ENCOUNTER — ANESTHESIA (OUTPATIENT)
Dept: SURGERY | Facility: AMBULATORY SURGERY CENTER | Age: 74
End: 2019-11-04

## 2019-11-04 ENCOUNTER — HOSPITAL ENCOUNTER (OUTPATIENT)
Facility: AMBULATORY SURGERY CENTER | Age: 74
End: 2019-11-04
Attending: OPHTHALMOLOGY
Payer: COMMERCIAL

## 2019-11-04 VITALS
SYSTOLIC BLOOD PRESSURE: 128 MMHG | HEART RATE: 66 BPM | WEIGHT: 154 LBS | HEIGHT: 75 IN | BODY MASS INDEX: 19.15 KG/M2 | OXYGEN SATURATION: 96 % | RESPIRATION RATE: 14 BRPM | TEMPERATURE: 97.9 F | DIASTOLIC BLOOD PRESSURE: 70 MMHG

## 2019-11-04 DIAGNOSIS — H25.13 NUCLEAR SENILE CATARACT OF BOTH EYES: Primary | ICD-10-CM

## 2019-11-04 LAB
GLUCOSE BLDC GLUCOMTR-MCNC: 113 MG/DL (ref 70–99)
GLUCOSE BLDC GLUCOMTR-MCNC: 116 MG/DL (ref 70–99)

## 2019-11-04 DEVICE — IMPLANTABLE DEVICE: Type: IMPLANTABLE DEVICE | Site: EYE | Status: FUNCTIONAL

## 2019-11-04 RX ORDER — DEXAMETHASONE SODIUM PHOSPHATE 4 MG/ML
INJECTION, SOLUTION INTRA-ARTICULAR; INTRALESIONAL; INTRAMUSCULAR; INTRAVENOUS; SOFT TISSUE PRN
Status: DISCONTINUED | OUTPATIENT
Start: 2019-11-04 | End: 2019-11-04 | Stop reason: HOSPADM

## 2019-11-04 RX ORDER — MOXIFLOXACIN IN NACL,ISO-OS/PF 0.3MG/0.3
SYRINGE (ML) INTRAOCULAR PRN
Status: DISCONTINUED | OUTPATIENT
Start: 2019-11-04 | End: 2019-11-04 | Stop reason: HOSPADM

## 2019-11-04 RX ORDER — BALANCED SALT SOLUTION 6.4; .75; .48; .3; 3.9; 1.7 MG/ML; MG/ML; MG/ML; MG/ML; MG/ML; MG/ML
SOLUTION OPHTHALMIC PRN
Status: DISCONTINUED | OUTPATIENT
Start: 2019-11-04 | End: 2019-11-04 | Stop reason: HOSPADM

## 2019-11-04 RX ORDER — MEPERIDINE HYDROCHLORIDE 25 MG/ML
12.5 INJECTION INTRAMUSCULAR; INTRAVENOUS; SUBCUTANEOUS
Status: DISCONTINUED | OUTPATIENT
Start: 2019-11-04 | End: 2019-11-05 | Stop reason: HOSPADM

## 2019-11-04 RX ORDER — OXYCODONE HYDROCHLORIDE 5 MG/1
5 TABLET ORAL EVERY 4 HOURS PRN
Status: DISCONTINUED | OUTPATIENT
Start: 2019-11-04 | End: 2019-11-05 | Stop reason: HOSPADM

## 2019-11-04 RX ORDER — ACETAMINOPHEN 325 MG/1
975 TABLET ORAL ONCE
Status: DISCONTINUED | OUTPATIENT
Start: 2019-11-04 | End: 2019-11-04 | Stop reason: HOSPADM

## 2019-11-04 RX ORDER — ONDANSETRON 2 MG/ML
INJECTION INTRAMUSCULAR; INTRAVENOUS PRN
Status: DISCONTINUED | OUTPATIENT
Start: 2019-11-04 | End: 2019-11-04

## 2019-11-04 RX ORDER — PHENYLEPHRINE HYDROCHLORIDE 25 MG/ML
1 SOLUTION/ DROPS OPHTHALMIC
Status: COMPLETED | OUTPATIENT
Start: 2019-11-04 | End: 2019-11-04

## 2019-11-04 RX ORDER — CYCLOPENTOLATE HYDROCHLORIDE 10 MG/ML
1 SOLUTION/ DROPS OPHTHALMIC
Status: COMPLETED | OUTPATIENT
Start: 2019-11-04 | End: 2019-11-04

## 2019-11-04 RX ORDER — TETRACAINE HYDROCHLORIDE 5 MG/ML
SOLUTION OPHTHALMIC PRN
Status: DISCONTINUED | OUTPATIENT
Start: 2019-11-04 | End: 2019-11-04 | Stop reason: HOSPADM

## 2019-11-04 RX ORDER — TROPICAMIDE 10 MG/ML
1 SOLUTION/ DROPS OPHTHALMIC
Status: COMPLETED | OUTPATIENT
Start: 2019-11-04 | End: 2019-11-04

## 2019-11-04 RX ORDER — FENTANYL CITRATE 50 UG/ML
25-50 INJECTION, SOLUTION INTRAMUSCULAR; INTRAVENOUS
Status: DISCONTINUED | OUTPATIENT
Start: 2019-11-04 | End: 2019-11-04 | Stop reason: HOSPADM

## 2019-11-04 RX ORDER — SODIUM CHLORIDE, SODIUM LACTATE, POTASSIUM CHLORIDE, CALCIUM CHLORIDE 600; 310; 30; 20 MG/100ML; MG/100ML; MG/100ML; MG/100ML
500 INJECTION, SOLUTION INTRAVENOUS CONTINUOUS
Status: DISCONTINUED | OUTPATIENT
Start: 2019-11-04 | End: 2019-11-04 | Stop reason: HOSPADM

## 2019-11-04 RX ORDER — LIDOCAINE 40 MG/G
CREAM TOPICAL
Status: DISCONTINUED | OUTPATIENT
Start: 2019-11-04 | End: 2019-11-04 | Stop reason: HOSPADM

## 2019-11-04 RX ORDER — ONDANSETRON 2 MG/ML
4 INJECTION INTRAMUSCULAR; INTRAVENOUS EVERY 30 MIN PRN
Status: DISCONTINUED | OUTPATIENT
Start: 2019-11-04 | End: 2019-11-05 | Stop reason: HOSPADM

## 2019-11-04 RX ORDER — KETOROLAC TROMETHAMINE 4 MG/ML
1 SOLUTION/ DROPS OPHTHALMIC 4 TIMES DAILY
Qty: 5 ML | Refills: 0 | Status: SHIPPED | OUTPATIENT
Start: 2019-11-04 | End: 2020-01-29

## 2019-11-04 RX ORDER — ONDANSETRON 4 MG/1
4 TABLET, ORALLY DISINTEGRATING ORAL EVERY 30 MIN PRN
Status: DISCONTINUED | OUTPATIENT
Start: 2019-11-04 | End: 2019-11-05 | Stop reason: HOSPADM

## 2019-11-04 RX ORDER — NALOXONE HYDROCHLORIDE 0.4 MG/ML
.1-.4 INJECTION, SOLUTION INTRAMUSCULAR; INTRAVENOUS; SUBCUTANEOUS
Status: DISCONTINUED | OUTPATIENT
Start: 2019-11-04 | End: 2019-11-05 | Stop reason: HOSPADM

## 2019-11-04 RX ORDER — LIDOCAINE HYDROCHLORIDE 10 MG/ML
INJECTION, SOLUTION EPIDURAL; INFILTRATION; INTRACAUDAL; PERINEURAL PRN
Status: DISCONTINUED | OUTPATIENT
Start: 2019-11-04 | End: 2019-11-04 | Stop reason: HOSPADM

## 2019-11-04 RX ORDER — SODIUM CHLORIDE, SODIUM LACTATE, POTASSIUM CHLORIDE, CALCIUM CHLORIDE 600; 310; 30; 20 MG/100ML; MG/100ML; MG/100ML; MG/100ML
INJECTION, SOLUTION INTRAVENOUS CONTINUOUS
Status: DISCONTINUED | OUTPATIENT
Start: 2019-11-04 | End: 2019-11-05 | Stop reason: HOSPADM

## 2019-11-04 RX ORDER — PREDNISOLONE ACETATE 10 MG/ML
1 SUSPENSION/ DROPS OPHTHALMIC 4 TIMES DAILY
Qty: 5 ML | Refills: 1 | Status: SHIPPED | OUTPATIENT
Start: 2019-11-04 | End: 2020-01-29

## 2019-11-04 RX ORDER — PROPARACAINE HYDROCHLORIDE 5 MG/ML
1 SOLUTION/ DROPS OPHTHALMIC ONCE
Status: COMPLETED | OUTPATIENT
Start: 2019-11-04 | End: 2019-11-04

## 2019-11-04 RX ORDER — OFLOXACIN 3 MG/ML
1 SOLUTION/ DROPS OPHTHALMIC 3 TIMES DAILY
Qty: 5 ML | Refills: 1 | Status: SHIPPED | OUTPATIENT
Start: 2019-11-04 | End: 2019-11-19

## 2019-11-04 RX ORDER — FENTANYL CITRATE 50 UG/ML
INJECTION, SOLUTION INTRAMUSCULAR; INTRAVENOUS PRN
Status: DISCONTINUED | OUTPATIENT
Start: 2019-11-04 | End: 2019-11-04

## 2019-11-04 RX ADMIN — TROPICAMIDE 1 DROP: 10 SOLUTION/ DROPS OPHTHALMIC at 11:45

## 2019-11-04 RX ADMIN — FENTANYL CITRATE 25 MCG: 50 INJECTION, SOLUTION INTRAMUSCULAR; INTRAVENOUS at 13:26

## 2019-11-04 RX ADMIN — CYCLOPENTOLATE HYDROCHLORIDE 1 DROP: 10 SOLUTION/ DROPS OPHTHALMIC at 11:50

## 2019-11-04 RX ADMIN — PHENYLEPHRINE HYDROCHLORIDE 1 DROP: 25 SOLUTION/ DROPS OPHTHALMIC at 11:50

## 2019-11-04 RX ADMIN — TROPICAMIDE 1 DROP: 10 SOLUTION/ DROPS OPHTHALMIC at 11:50

## 2019-11-04 RX ADMIN — CYCLOPENTOLATE HYDROCHLORIDE 1 DROP: 10 SOLUTION/ DROPS OPHTHALMIC at 11:55

## 2019-11-04 RX ADMIN — PHENYLEPHRINE HYDROCHLORIDE 1 DROP: 25 SOLUTION/ DROPS OPHTHALMIC at 11:55

## 2019-11-04 RX ADMIN — PHENYLEPHRINE HYDROCHLORIDE 1 DROP: 25 SOLUTION/ DROPS OPHTHALMIC at 11:45

## 2019-11-04 RX ADMIN — ONDANSETRON 4 MG: 2 INJECTION INTRAMUSCULAR; INTRAVENOUS at 13:26

## 2019-11-04 RX ADMIN — PROPARACAINE HYDROCHLORIDE 1 DROP: 5 SOLUTION/ DROPS OPHTHALMIC at 11:44

## 2019-11-04 RX ADMIN — SODIUM CHLORIDE, SODIUM LACTATE, POTASSIUM CHLORIDE, CALCIUM CHLORIDE 500 ML: 600; 310; 30; 20 INJECTION, SOLUTION INTRAVENOUS at 11:56

## 2019-11-04 RX ADMIN — TROPICAMIDE 1 DROP: 10 SOLUTION/ DROPS OPHTHALMIC at 11:55

## 2019-11-04 RX ADMIN — CYCLOPENTOLATE HYDROCHLORIDE 1 DROP: 10 SOLUTION/ DROPS OPHTHALMIC at 11:45

## 2019-11-04 ASSESSMENT — MIFFLIN-ST. JEOR: SCORE: 1534.17

## 2019-11-04 ASSESSMENT — COPD QUESTIONNAIRES: COPD: 1

## 2019-11-04 NOTE — ANESTHESIA CARE TRANSFER NOTE
Patient: Amos Walker    Procedure(s):  Right Eye Phacoemulsification with Intraocular Lens, Dexamethasone    Diagnosis: Cataracts  Diagnosis Additional Information: No value filed.    Anesthesia Type:   MAC     Note:  Anesthesia Care Transfer Notewriter    Vitals: (Last set prior to Anesthesia Care Transfer)    CRNA VITALS  11/4/2019 1331 - 11/4/2019 1408      11/4/2019             Pulse:  70    Ht Rate:  70    SpO2:  98 %    Resp Rate (set):  10                Electronically Signed By: LEW Betancourt CRNA  November 4, 2019  2:08 PM

## 2019-11-04 NOTE — ANESTHESIA POSTPROCEDURE EVALUATION
Anesthesia POST Procedure Evaluation    Patient: Amos Walker   MRN:     [id number] Gender:   male   Age:    74 year old :      1945        Preoperative Diagnosis: Cataracts   Procedure(s):  Right Eye Phacoemulsification with Intraocular Lens, Dexamethasone   Postop Comments: No value filed.       Anesthesia Type:  Not documented  MAC    Reportable Event: NO     PAIN: Uncomplicated   Sign Out status: Comfortable, Well controlled pain     PONV: No PONV   Sign Out status:  No Nausea or Vomiting     Neuro/Psych: Uneventful perioperative course   Sign Out Status: Preoperative baseline; Age appropriate mentation     Airway/Resp.: Uneventful perioperative course   Sign Out Status: Non labored breathing, age appropriate RR; Resp. Status within EXPECTED Parameters     CV: Uneventful perioperative course   Sign Out status: Appropriate BP and perfusion indices; Appropriate HR/Rhythm     Disposition:   Sign Out in:  PACU  Disposition:  Phase II; Home  Recovery Course: Uneventful  Follow-Up: Not required           Last Anesthesia Record Vitals:  CRNA VITALS  2019 1331 - 2019 1431      2019             Pulse:  70    Ht Rate:  70    SpO2:  98 %    Resp Rate (set):  10          Last PACU Vitals:  Vitals Value Taken Time   /70 2019  2:06 PM   Temp 36.1  C (97  F) 2019  2:06 PM   Pulse 78 2019  2:06 PM   Resp 16 2019  2:06 PM   SpO2 97 % 2019  2:06 PM   Temp src     NIBP 119/60 2019  1:56 PM   Pulse 70 2019  2:01 PM   SpO2 98 % 2019  2:01 PM   Resp     Temp     Ht Rate 70 2019  2:01 PM   Temp 2           Electronically Signed By: Manuel Edmonds MD, 2019, 3:07 PM

## 2019-11-04 NOTE — OP NOTE
PREOPERATIVE DIAGNOSIS: Visually significant cataract, Right eye   POSTOPERATIVE DIAGNOSIS: Same   PROCEDURES:   1. Cataract extraction with intraocular lens implant Right eye.  SURGEON: Dominic Purdy M.D.  RESIDENT: Doe Hines MD  INDICATIONS: The patient Amos Walker presented to the eye clinic with decreased vision secondary to cataract in the Right eye. The risks, benefits and alternatives to cataract extraction were discussed. The patient elected to proceed. All questions were answered to the patient's satisfaction.   DESCRIPTION OF PROCEDURE:   Prior to the procedure, appropriate cardiac and respiratory monitors were applied to the patient.  In the pre-operative holding area, a drop of topical tetracaine followed by lidocaine gel followed by povidone iodine.  Pre-operative toric markings were placed at the 90-degree axis using a gentian violet marker while the patient was seated upright.  The patient was brought to the operating room where a surgical pause was carried out to identify with all members of the surgical team the correct surgical site.  With adequate anesthesia, the Right eye was prepped and draped in the usual sterile fashion. A lid speculum was placed, and the operating microscope was rotated into position.  Toric axis was reviewed and marked according to pre-operative calculations and the 90-degree marking that had previously been placed.  A paracentesis was created.  Through this limbal paracentesis, the anterior chamber was filled with preservative-free lidocaine followed by viscoelastic.  A temporal wound was created at the limbus using a 2.6 mm blade. A capsulorrhexis was initiated using a bent 25-gauge needle and was completed in continuous and circular fashion using the capsulorrhexis forceps. The lens nucleus was hydrodissected using balanced salt solution.  The lens nucleus was rotated and removed using phacoemulsification in a stop and chop technique.  Residual cortical material  was removed using irrigation-aspiration.  The capsular bag was reinflated to its maximal extent with cohesive viscoelastic.  A 15.0 diopter [id number] inserted into the capsular bag.  The lens power selected was reviewed using the intraocular lens power measurements that were obtained preoperatively to confirm that the correct lens was selected for the desired post-operative refractive state. The residual viscoelastic was removed in its entirety, the wound were hydrated and found to be self-sealing.  Intracameral moxifloxacin was administered. Tactile pressure was confirmed to be in a normal range.  The lid speculum was removed and a patch and shield were applied.   The patient tolerated the procedure well, and there were no complications.    PLAN: The patient will be discharged to home and will follow up tomorrow morning in the eye clinic.  EBL:  None  Complications:  None  Implant Name Type Inv. Item Serial No.  Lot No. LRB No. Used   EYE IMP IOL MAGO 1-PIECE TECNIS TORIC +15.0 [id number] Lens/Eye Implant EYE IMP IOL MAGO 1-PIECE TECNIS TORIC +15.0 [id number] [facility] OPTIC  Right 1       Doe Hines M.D.  PGY-4, Ophthalmology
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
## 2019-11-04 NOTE — BRIEF OP NOTE
[facility] Brief Operative Note    Pre-operative diagnosis: Cataracts   Post-operative diagnosis * No post-op diagnosis entered *  same   Procedure: Procedure(s):  Right Eye Phacoemulsification with Intraocular Lens, Dexamethasone   Surgeon(s): Surgeon(s) and Role:     * Dominic Purdy MD - Primary     * Rickie Dunham MD - Resident - Assisting     * Doe Hines MD - Resident - Observing   Estimated blood loss: * No values recorded between 11/4/2019  1:33 PM and 11/4/2019  2:04 PM *    Specimens: * No specimens in log *   Findings: None  Doe Hines M.D.  PGY-4, Ophthalmology

## 2019-11-04 NOTE — DISCHARGE INSTRUCTIONS
[facility]  Home Care Following Anesthesia  For 24 hours after surgery:  1. Get plenty of rest.  A responsible adult must stay with you for at least 24 hours after you leave the surgery center.  2. Do not drive or use heavy equipment.  If you have weakness or tingling, don't drive or use heavy equipment until this feeling goes away.   3. Do not drink alcohol.   4. Avoid strenuous or risky activities.  Ask for help when climbing stairs.  5. You may feel lightheaded.  IF so, sit for a few minutes before standing.  Have someone help you get up.   6. If you have nausea (feel sick to your stomach): Drink only clear liquids such as apple juice, ginger ale, broth or 7-Up.  Rest may also help.  Be sure to drink enough fluids.  Move to a regular diet as you feel able.   7. You may have a slight fever.  Call the doctor if your fever is over 100 F (37.7 C) (taken under the tongue) or lasts longer than 24 hours.  8. You may have a dry mouth, a sore throat, muscle aches or trouble sleeping. These should go away after 24 hours.  9. Do not make important or legal decisions.               Tips for taking pain medications  To get the best pain relief possible, remember these points:    Take pain medications as directed, before pain becomes severe.    Pain medication can upset your stomach: taking it with food may help.    Constipation is a common side effect of pain medication. Drink plenty of  fluids.    Eat foods high in fiber. Take a stool softener if recommended by your doctor or pharmacist.    Do not drink alcohol, drive or operate machinery while taking pain medications.    Ask about other ways to control pain, such as with heat, ice or relaxation.    Tylenol/Acetaminophen Consumption  To help encourage the safe use of acetaminophen, the makers of TYLENOL  have lowered the maximum daily dose for single-ingredient Extra Strength TYLENOL  (acetaminophen) products sold in the [place] from 8  pills per day (4,000 mg) to 6 pills per day (3,000 mg). The dosing interval has also changed from 2 pills every 4-6 hours to 2 pills every 6 hours.    If you feel your pain relief is insufficient, you may take Tylenol/Acetaminophen in addition to your narcotic pain medication.     Be careful not to exceed 3,000 mg of Tylenol/Acetaminophen in a 24 hour period from all sources.    If you are taking extra strength Tylenol/acetaminophen (500 mg), the maximum dose is 6 tablets in 24 hours.    If you are taking regular strength acetaminophen (325 mg), the maximum dose is 9 tablets in 24 hours.    Call a doctor for any of the followin. Signs of infection (fever, growing tenderness at the surgery site, a large amount of drainage or bleeding, severe pain, foul-smelling drainage, redness, swelling).  2. It has been over 8 to 10 hours since surgery and you are still not able to urinate (pass water).  3. Headache for over 24 hours.  Your doctor is:       Dr. Dominic Purdy, Ophthalmology: [phone]               Or dial [phone] and ask for the resident on call for:  Ophthalmology  For emergency care, call the:  [place] [facility]:  [phone] (TTY for hearing impaired: [phone])    [facility]     Home Care Following Cataract Surgery    If you have a gauze eye patch on, please do not remove it until it is removed by your doctor at your first appointment after your surgery.  You will start your eye drops the next day.    OR    If you only have a clear eye shield on, you may remove the eye shield when you get home and begin eye drops today as directed by your doctor.      Wear the clear eye shield for protection when sleeping for the next 5 days.      Do not rub the eye that had the operation.      Your eye might be sensitive to light.  Wearing sunglasses may be more comfortable for you.      You may have some discomfort and irritation.  Acetaminophen (Tylenol) or  Ibuprofen (Advil) may be taken for discomfort. If pain persists please call your doctor s office.      Keep the eye that had the surgery dry. You may wash your hair, bathe or shower, but keep your eye closed while doing so.       Avoid bending over, strenuous activity or heavy lifting until this activity has been approved by your doctor.      You have a follow-up appointment with your doctor today or tomorrow at the [facility] ([phone]).  Bring all your prescribed eye drops with you to this follow-up appointment.        If you take glaucoma medications, bring them with you to your follow-up appointment tomorrow.      Use medication exactly as prescribed by your doctor. Wait 1 -3 minutes between eye drops.  You may restart your regular home medications.       Occasional blood-tinged tears are normal the day or two after surgery. However, if there is large or persistent bleeding from the eye, that is not normal, and you should contact the clinic.      Call your doctor s office at [phone] if any of the following should occur:    - Any sudden vision changes, including decreased vision  - Nausea or severe headache  - Increase in pain that is not controlled with Acetaminophen (Tylenol) or Ibuprofen (Advil)  - Signs of infection (pus, increasing redness or tenderness)  - Severe sensitivity to light  - An increase in floaters (black spots in front of your vision)

## 2019-11-05 ENCOUNTER — OFFICE VISIT (OUTPATIENT)
Dept: OPHTHALMOLOGY | Facility: CLINIC | Age: 74
End: 2019-11-05
Attending: OPHTHALMOLOGY
Payer: COMMERCIAL

## 2019-11-05 DIAGNOSIS — Z96.1 PSEUDOPHAKIA OF BOTH EYES: Primary | ICD-10-CM

## 2019-11-05 DIAGNOSIS — E11.3293 MILD NONPROLIFERATIVE DIABETIC RETINOPATHY OF BOTH EYES WITHOUT MACULAR EDEMA ASSOCIATED WITH TYPE 2 DIABETES MELLITUS (H): ICD-10-CM

## 2019-11-05 LAB
BACTERIA SPEC CULT: NO GROWTH
BACTERIA SPEC CULT: NO GROWTH
Lab: NORMAL
Lab: NORMAL
SPECIMEN SOURCE: NORMAL
SPECIMEN SOURCE: NORMAL

## 2019-11-05 PROCEDURE — 92015 DETERMINE REFRACTIVE STATE: CPT | Mod: 52,ZF

## 2019-11-05 PROCEDURE — G0463 HOSPITAL OUTPT CLINIC VISIT: HCPCS | Mod: ZF

## 2019-11-05 ASSESSMENT — VISUAL ACUITY
METHOD: SNELLEN - LINEAR
OD_SC+: -2
OS_SC: 20/20
OD_SC: 20/20
OS_SC+: -2

## 2019-11-05 ASSESSMENT — TONOMETRY
OS_IOP_MMHG: X
OD_IOP_MMHG: 31
OD_IOP_MMHG: 32
OS_IOP_MMHG: 17
IOP_METHOD: TONOPEN
OS_IOP_MMHG: X
IOP_METHOD: TONOPEN
OD_IOP_MMHG: 33
IOP_METHOD: TONOPEN

## 2019-11-05 ASSESSMENT — SLIT LAMP EXAM - LIDS
COMMENTS: NORMAL
COMMENTS: NORMAL

## 2019-11-05 ASSESSMENT — EXTERNAL EXAM - RIGHT EYE: OD_EXAM: NORMAL

## 2019-11-05 ASSESSMENT — CUP TO DISC RATIO: OS_RATIO: 0.35

## 2019-11-05 ASSESSMENT — EXTERNAL EXAM - LEFT EYE: OS_EXAM: NORMAL

## 2019-11-05 NOTE — PROGRESS NOTES
Chief Complaint(s) and History of Present Illness(es)     Post Op (Ophthalmology) Both Eyes     Laterality: both eyes    Timing: throughout the day    Context: reading    Course: rapidly improving    Associated symptoms: headache.  Negative for eye pain, dryness and   redness    Pain scale: 0/10              Comments     POD #0 for Phaco/IOL of RE / hazy VA per pt / slight headache.    POW #2 for Phaco/IOL of LE  Notes longstanding floater in LE that has been present for a few years is   more bothersome as can see it better s/p surgery.  Prednisolone TID and Ketorolac four times a day to LE    Pt is wondering how he can manage reading at near until BE heal.    Ariana Thorne, COT COT 12:37 PM 11/05/2019                Review of systems for the eyes was negative other than the pertinent positives/negatives listed in the HPI.      Assessment & Plan      Amos Walker is a 74 year old male with the following diagnoses:   1. Pseudophakia of both eyes    2. 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    Doe Hines M.D.  PGY-4, Ophthalmology          Attending Physician Attestation:  Complete documentation of historical and exam elements from today's encounter can be found in the full encounter summary report (not reduplicated in this progress note).  I personally obtained the chief complaint(s) and history of present illness.  I confirmed and edited as necessary the review  of systems, past medical/surgical history, family history, social history, and examination findings as documented by others; and I examined the patient myself.  I personally reviewed the relevant tests, images, and reports as documented above.  I formulated and edited as necessary the assessment and plan and discussed the findings and management plan with the patient and family. . - Dominic Purdy MD

## 2019-11-05 NOTE — PROGRESS NOTES
[facility]  Primary Care Center   Racheal Swift MD  11/06/2019      Chief Complaint:   Hospital F/U and Constipation       History of Present Illness:   Amos Walker is a 74 year old male with a history of type 2 diabetes mellitus, chronic kidney disease, colitis, and heart disease who presents with his wife for a hospital follow up.    Hospital summary and follow up: He presented to the ED on 10/30 following a hypotensive event at the podiatrist with a blood pressure of 67/43. He was admitted to the hospital, and an abdominal CT showed colitis at the splenic flexure and descending colon concerning for ischemic colitis. He had leukocytosis 14k with L shift on admission, improvement in symptoms with antibiotics and WBC normalized. The providers were intending to discharge him, but he had an episode of bright red blood in the stool, concerning for a lower GI bleed. GI was consulted, and they felt the episode was most consistent with acute ischemic colitis. He was observed overnight and had no further episodes of hematochezia. His hemoglobin was 9.5 on discharge.     He thinks that the hypotensive episode was due to dehydration. The night before, he got up every hour to urinate, and the morning of, he had 5 watery bowel movements. He has been drinking a lot of water since he has been home. He took Lisinopril 5 mg yesterday and today because he was concerned that his blood pressure was high. Since he has been home, his diastolic blood pressures have been in the upper 50s, and his systolics in the 120s and 130s.     He has been constipated since last Wednesday. He did notice the blood in his stool when it occurred on the day of his hospital admission, so he thinks that he would be able to notice blood if it happened again. This was the first time he has ever noticed blood in his stool.     Cataracts: He had cataract surgery on 11/4, which went well. He is very happy with the results.     Lungs: He had a chest  x-ray in the hospital on 10/30, showing hyper-expanded lungs without focal airspace disease. The abdominal CT on 10/30 showed central lobar and paraseptal emphysematous changes in the lungs. He has not had a chest CT this year or PFTs. Denies pulmonary symptoms.    Type 2 diabetes mellitus: He has to calibrate his Danny sensor, because it read ten points higher than the monitor they had at the hospital. For insulin, he uses Trulicity 1.5 mg weekly, Lantus 8 units in the morning, and Humalog 4/3/3 units with meals. He reports that his glucose is almost always over 100, and if it is under 100, he eats something immediately. He has been over 200 a couple of times. He is normally in the 120-130 range. His last A1C was 5.6 on 10/04. He would like to wait until his next A1C reading to adjust medication, if necessary. He needs more sensors.     Other concerns discussed:  1. He sees podiatry this afternoon for his Charcot foot.        Routine Health Maintenance  Immunizations:        Most Recent Immunizations   Administered Date(s) Administered     Influenza (High Dose) 3 valent vaccine 10/04/2019     Influenza (IIV3) PF 11/01/2013     Pneumo Conj 13-V (2010&after) 11/03/2014     Pneumococcal 23 valent 12/21/2015     TDAP Vaccine (Boostrix) 03/21/2016         Lipids:               Recent Labs   Lab Test 03/27/19  0934 11/16/18  0915   11/18/14  0853   CHOL 95 100   < > 110   HDL 38* 48   < > 45   LDL 49 42   < > 45   TRIG 40 50   < > 100   CHOLHDLRATIO  --   --   --  2.4    < > = values in this interval not displayed.      PSA (50-75 yrs): No results found for: PSA     AAA Screening (65-75 yrs): CT 12/17, negative  Lung Ca Screening (>30 py 55-79 or >20 py 50-79 + RF): 5/18 5x3 mm nodule, emphysema, due  Colonoscopy (50-75 yrs): 4/18, recommend repeat when off plavix, pending for June and he will hold plavix x 5 days; 6/19 Impression:          - The examined portion of the ileum was normal.                        - One 5 mm  polyp in the cecum, removed with a cold snare                        and removed using injection-lift and a cold snare.                        Resected and retrieved.                        - One 6 mm polyp in the transverse colon, removed with a                        hot snare and removed using injection-lift and a hot                        snare. Resected and retrieved. Clip was placed.                        - One 5 mm polyp in the sigmoid colon, removed with a                        hot snare. Resected and retrieved.                        - The examination was otherwise normal on direct and                        retroflexion views.                        NOTE: the polyp reported as being in the descending                        colon in the prior colonoscopy report appears on images                        in the transverse colon; that is where we removed a                        likely SSA. The descending colon was inspected on                        multiple occasions and no polyps were identified.   Recommendation:      - Return to referring physician.                        - Repeat colonoscopy in 5 years for surveillance.                                                                           HIV/HCV if risk factors: nonreactive HepC 6/16  Safety/Lifestyle: reviewed  Tob/EtOH: declines quitting  Depression:   PHQ-2 Score:     PHQ-2 ( 1999 Pfizer) 10/15/2019 7/31/2019   Q1: Little interest or pleasure in doing things 0 0   Q2: Feeling down, depressed or hopeless 1 0   PHQ-2 Score 1 0     Advanced Directive: deferred      Review of Systems:   Pertinent items are noted in HPI or as in patient entered ROS below, remainder of complete ROS is negative.     Active Medications:     Current Outpatient Medications:      continuous blood glucose monitoring (FREESTYLE LATOYA) sensor, For use with Freestyle Latoya Flash  for continuous monitioring of blood glucose levels. Replace sensor every 14 days., Disp:  "4 each, Rfl: 3     lisinopril (PRINIVIL/ZESTRIL) 10 MG tablet, Take 0.5 tablets (5 mg) by mouth daily, Disp: 90 tablet, Rfl: 1     ammonium lactate (LAC-HYDRIN) 12 % cream, Apply topically 2 times daily as needed for dry skin, Disp: 385 g, Rfl: 3     ascorbic acid 500 MG TABS, Take 1 tablet (500 mg) by mouth 2 times daily, Disp: 30 tablet, Rfl:      ASPIRIN PO, Take 81 mg by mouth daily, Disp: , Rfl:      blood glucose monitoring (FREESTYLE) lancets, Use to test blood sugars 4 times daily as directed., Disp: 100 each, Rfl: 11     Blood Pressure KIT, 1 Device daily, Disp: 1 kit, Rfl: 0     clopidogrel (PLAVIX) 75 MG tablet, Take 1 tablet (75 mg) by mouth every evening, Disp: 90 tablet, Rfl: 3     Continuous Blood Gluc  (FREESTYLE LATOYA 14 DAY READER) TANIA, 1 Device every hour as needed (every hour prn), Disp: 1 Device, Rfl: 0     dulaglutide (TRULICITY) 1.5 MG/0.5ML pen, Inject 1.5 mg Subcutaneous every 7 days, Disp: 2 mL, Rfl: 2     ferrous sulfate (IRON) 325 (65 FE) MG tablet, Take 1 tablet (325 mg) by mouth 2 times daily, Disp: 60 tablet, Rfl: 11     Gauze Pads & Dressings (OPTIFOAM) 6\"X6\" PADS, 1 Box once a week, Disp: 1 each, Rfl: 6     gentamicin (GARAMYCIN) 0.1 % external cream, Apply to left foot and leg ulcers., Disp: 30 g, Rfl: 5     insulin glargine (LANTUS PEN) 100 UNIT/ML pen, Inject 4 Units Subcutaneous At Bedtime Increase by 2 units until goal sugar of 100-130 reached, max 10 units., Disp: 3 mL, Rfl: 3     insulin lispro (HUMALOG PEN) 100 UNIT/ML pen, 4 UNITS WITH BREAKFAST, 3 UNITS WITH LUNCH AND DINNER, Disp: 3 mL, Rfl: 3     insulin pen needle (B-D U/F) 31G X 8 MM miscellaneous, USE FOUR DAILY AS DIRECTED, Disp: 100 each, Rfl: 11     ketoconazole (NIZORAL) 2 % external shampoo, Apply topically twice a week Leave on for 3-5 min then rinse off; after 2-4 weeks use only once weekly, Disp: 120 mL, Rfl: 3     ketorolac tromethamine (ACULAR-LS) 0.4 % SOLN ophthalmic solution, Apply 1 drop to eye 4 " times daily Instill into operative eye(s) per physician instructions., Disp: 5 mL, Rfl: 0     ketorolac tromethamine (ACULAR-LS) 0.4 % SOLN ophthalmic solution, Apply 1 drop to eye 4 times daily Instill into operative eye(s) per physician instructions., Disp: 5 mL, Rfl: 0     ofloxacin (OCUFLOX) 0.3 % ophthalmic solution, Apply 1 drop to eye 3 times daily Instill into operative eye(s) per physician instructions., Disp: 5 mL, Rfl: 1     ofloxacin (OCUFLOX) 0.3 % ophthalmic solution, Apply 1 drop to eye 3 times daily Instill into operative eye(s) per physician instructions., Disp: 5 mL, Rfl: 1     ONETOUCH ULTRA test strip, TEST TWICE DAILY AS DIRECTED, Disp: 200 strip, Rfl: 3     order for DME, Equipment being ordered: Roll a bout knee scooter, Disp: 1 Device, Rfl: 0     order for DME, Please measure and distribute 1 pair of 30mmHg - 40mmHg knee high open toe ulcercare compression stockings. Jobst ultrasheer or equivalent., Disp: 2 each, Rfl: 6     polyethylene glycol (MIRALAX/GLYCOLAX) powder, Take 17 g (1 capful) by mouth daily, Disp: 255 g, Rfl: 1     prednisoLONE acetate (PRED FORTE) 1 % ophthalmic suspension, Apply 1 drop to eye 4 times daily Instill into operative eye(s) per physician instructions., Disp: 5 mL, Rfl: 1     prednisoLONE acetate (PRED FORTE) 1 % ophthalmic suspension, Apply 1 drop to eye 4 times daily Instill into operative eye(s) per physician instructions., Disp: 5 mL, Rfl: 1     silver sulfADIAZINE (SILVADENE) 1 % external cream, Apply topically daily To affected areas on right foot and leg., Disp: 85 g, Rfl: 5     simvastatin (ZOCOR) 40 MG tablet, Take 1 tablet (40 mg) by mouth At Bedtime, Disp: 90 tablet, Rfl: 3     triamcinolone (KENALOG) 0.1 % external cream, Apply sparingly to left heel daily., Disp: 60 g, Rfl: 1     VITAMIN D, CHOLECALCIFEROL, PO, Take 1,000 Units by mouth 2 times daily, Disp: , Rfl:       Allergies:   Lisinopril; Neomycin; Methylchloroisothiazolinone  [methylisothiazolinone]; and Povidone iodine      Past Medical History:  Anemia   Coronary artery disease  Chronic kidney disease stage 3  Colon polyp   Emphysema of lung  Heart disease  Hypertension   Hyperlipidemia   MRSA cellulitis of right foot   Peripheral artery disease  Type 2 diabetes mellitus   Venous ulcer  Senile nuclear sclerosis  Fracture of neck of femur   Long-term (current) use of anticoagulants  Critical lower limb ischemia   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 and other part of foot  Charcot foot due to type 2 diabetes mellitus   Venous stasis   Colitis presumed infectious   Hypotension   Bright red blood per rectum      Past Surgical History:  Angiogram: 9/2018  Angioplasty: 9/2018  Arthroplasty hip: 8/2017  Cardiac surgery   Colonoscopy  Endarterectomy femoral: 9/2018  Cervical disc surgery  Right foot orthopedic surgery: 2009  Phacoemulsification with standard IOL: 10/2019  Right leg vascular surgery: 2009    Family History:   Colon cancer: father   Kidney disease: father, mother  Myocardial infarction: son  Macular degeneration: brother       Social History:   Smoking status: current every day smoker, 25 pack years  Alcohol use: no      Physical Exam:   BP (!) 152/68 (BP Location: Left arm, Patient Position: Sitting, Cuff Size: Adult Regular)   Pulse 79   Temp 98  F (36.7  C) (Oral)   Resp 17   SpO2 100%    Constitutional: Alert, oriented, pleasant, no acute distress. Sitting in wheelchair.   Head: Normocephalic, atraumatic  Eyes: Extra-ocular movements intact, no scleral icterus  Cardiovascular: Regular rate and rhythm, no murmurs, rubs or gallops, peripheral pulses full/symmetric  Respiratory: Good air movement bilaterally, lungs clear, no wheezes/rales/rhonchi  Musculoskeletal: No edema, normal muscle tone, normal gait  Psychiatric: normal mentation, affect and mood     Assessment and Plan:  Benign essential hypertension  He was recently hypotensive,  likely secondary to dehydration and GI losses. We will resume lower dose Lisinopril with titration instructions.   - lisinopril (PRINIVIL/ZESTRIL) 10 MG tablet  Dispense: 90 tablet; Refill: 1    Type 2 diabetes mellitus with diabetic peripheral angiopathy without gangrene, with long-term current use of insulin (H)  Last A1C was 5.6. We discussed the risks of hypoglycemia. We reviewed his blood glucose monitor, and the values are largely in the range of 120-200. There was some concern that his monitor was reading higher than his true values. I will ask the pharmacist to follow up with him regarding titration of his CGM.   - continuous blood glucose monitoring (FREESTYLE LATOYA) sensor  Dispense: 4 each; Refill: 3    Ischemic colitis (H)  Symptoms are resolved. We discussed prognosis of this condition and the need to seek medical treatment if symptoms reoccur.   - GASTROENTEROLOGY ADULT REFERRAL    Tobacco dependence syndrome  Advised scheduling of annual lung CT and PFTs.   - General PFT Lab (Please always keep checked)  - Pulmonary Function Test      Follow-up: Return in about 6 weeks (around 12/18/2019) for Routine Visit.        Scribe Disclosure:  Alpa TAPIA, am serving as a scribe to document services personally performed by Racheal Swift MD at this visit, based upon the provider's statements to me. All documentation has been reviewed by the aforementioned provider prior to being entered into the official medical record.    Scribe Preparation Attestation:  Alpa TAPIA, a scribe, prepared the chart for today's encounter.      Portions of this medical record were completed by a scribe. UPON MY REVIEW AND AUTHENTICATION BY ELECTRONIC SIGNATURE, this confirms (a) I performed the applicable clinical services, and (b) the record is accurate.   Racheal Swift MD  Internal Medicine    25 min spent face to face, of which >50% time spent on counseling/coordinating care exclusive of any procedure time

## 2019-11-05 NOTE — NURSING NOTE
Chief Complaints and History of Present Illnesses   Patient presents with     Post Op (Ophthalmology) Both Eyes     Chief Complaint(s) and History of Present Illness(es)     Post Op (Ophthalmology) Both Eyes     Laterality: both eyes    Timing: throughout the day    Context: reading    Course: rapidly improving    Associated symptoms: headache.  Negative for eye pain, dryness and redness    Pain scale: 0/10              Comments     POD #0 for Phaco/IOL of RE / hazy VA per pt / slight headache.    POW #2 for Phaco/IOL of LE  Notes longstanding floater in LE that has been present for a few years is more bothersome as can see it better s/p surgery.  Prednisolone TID and Ketorolac four times a day to LE    Pt is wondering how he can manage reading at near until BE heal.    Ariana Thorne, COT COT 12:37 PM 11/05/2019

## 2019-11-06 ENCOUNTER — OFFICE VISIT (OUTPATIENT)
Dept: INTERNAL MEDICINE | Facility: CLINIC | Age: 74
End: 2019-11-06
Payer: COMMERCIAL

## 2019-11-06 ENCOUNTER — OFFICE VISIT (OUTPATIENT)
Dept: PODIATRY | Facility: CLINIC | Age: 74
End: 2019-11-06
Payer: COMMERCIAL

## 2019-11-06 VITALS — DIASTOLIC BLOOD PRESSURE: 68 MMHG | HEART RATE: 80 BPM | SYSTOLIC BLOOD PRESSURE: 142 MMHG | OXYGEN SATURATION: 99 %

## 2019-11-06 VITALS
SYSTOLIC BLOOD PRESSURE: 152 MMHG | RESPIRATION RATE: 17 BRPM | DIASTOLIC BLOOD PRESSURE: 68 MMHG | HEART RATE: 79 BPM | TEMPERATURE: 98 F | OXYGEN SATURATION: 100 %

## 2019-11-06 DIAGNOSIS — I10 BENIGN ESSENTIAL HYPERTENSION: ICD-10-CM

## 2019-11-06 DIAGNOSIS — I87.8 VENOUS STASIS: ICD-10-CM

## 2019-11-06 DIAGNOSIS — E11.49 TYPE II OR UNSPECIFIED TYPE DIABETES MELLITUS WITH NEUROLOGICAL MANIFESTATIONS, NOT STATED AS UNCONTROLLED(250.60) (H): Primary | ICD-10-CM

## 2019-11-06 DIAGNOSIS — E11.610 CHARCOT FOOT DUE TO DIABETES MELLITUS (H): ICD-10-CM

## 2019-11-06 DIAGNOSIS — E11.51 DIABETES MELLITUS WITH PERIPHERAL VASCULAR DISEASE (H): ICD-10-CM

## 2019-11-06 DIAGNOSIS — Z87.2 HISTORY OF FOOT ULCER: ICD-10-CM

## 2019-11-06 DIAGNOSIS — E11.51 TYPE 2 DIABETES MELLITUS WITH DIABETIC PERIPHERAL ANGIOPATHY WITHOUT GANGRENE, WITH LONG-TERM CURRENT USE OF INSULIN (H): ICD-10-CM

## 2019-11-06 DIAGNOSIS — B35.3 TINEA PEDIS OF BOTH FEET: ICD-10-CM

## 2019-11-06 DIAGNOSIS — K55.9 ISCHEMIC COLITIS (H): Primary | ICD-10-CM

## 2019-11-06 DIAGNOSIS — Z79.4 TYPE 2 DIABETES MELLITUS WITH DIABETIC PERIPHERAL ANGIOPATHY WITHOUT GANGRENE, WITH LONG-TERM CURRENT USE OF INSULIN (H): ICD-10-CM

## 2019-11-06 DIAGNOSIS — F17.200 TOBACCO DEPENDENCE SYNDROME: ICD-10-CM

## 2019-11-06 PROCEDURE — 99212 OFFICE O/P EST SF 10 MIN: CPT | Performed by: PODIATRIST

## 2019-11-06 RX ORDER — FLASH GLUCOSE SENSOR
KIT MISCELLANEOUS
Qty: 4 EACH | Refills: 3 | Status: SHIPPED | OUTPATIENT
Start: 2019-11-06 | End: 2020-06-12

## 2019-11-06 RX ORDER — LISINOPRIL 10 MG/1
5 TABLET ORAL DAILY
Qty: 90 TABLET | Refills: 1 | Status: SHIPPED | OUTPATIENT
Start: 2019-11-06 | End: 2020-01-29

## 2019-11-06 ASSESSMENT — PAIN SCALES - GENERAL: PAINLEVEL: NO PAIN (0)

## 2019-11-06 NOTE — NURSING NOTE
Amos Walker's chief complaint for this visit includes:  Chief Complaint   Patient presents with     RECHECK     Ulcer, right anterior ankle.  Ulcer, right lateral foot.     PCP: Racheal Swift    Referring Provider:  No referring provider defined for this encounter.    BP (!) 142/68 (BP Location: Left arm, Patient Position: Sitting, Cuff Size: Adult Regular)   Pulse 80   SpO2 99%   Data Unavailable     Do you need any medication refills at today's visit? Yesenia Vidal CMA

## 2019-11-06 NOTE — PROGRESS NOTES
Routine Health Maintenance  Immunizations:        Most Recent Immunizations   Administered Date(s) Administered     Influenza (High Dose) 3 valent vaccine 10/04/2019     Influenza (IIV3) PF 11/01/2013     Pneumo Conj 13-V (2010&after) 11/03/2014     Pneumococcal 23 valent 12/21/2015     TDAP Vaccine (Boostrix) 03/21/2016         Lipids:               Recent Labs   Lab Test 03/27/19  0934 11/16/18  0915   11/18/14  0853   CHOL 95 100   < > 110   HDL 38* 48   < > 45   LDL 49 42   < > 45   TRIG 40 50   < > 100   CHOLHDLRATIO  --   --   --  2.4    < > = values in this interval not displayed.      PSA (50-75 yrs): No results found for: PSA     AAA Screening (65-75 yrs): CT 12/17, negative  Lung Ca Screening (>30 py 55-79 or >20 py 50-79 + RF): 5/18 5x3 mm nodule, emphysema, due  Colonoscopy (50-75 yrs): 4/18, recommend repeat when off plavix, pending for June and he will hold plavix x 5 days  HIV/HCV if risk factors: nonreactive HepC 6/16  Safety/Lifestyle: reviewed  Tob/EtOH: declines quitting  Depression:   PHQ-2 Score:      PHQ-2 ( 1999 Pfizer) 11/20/2018 9/18/2018   Q1: Little interest or pleasure in doing things 0 0   Q2: Feeling down, depressed or hopeless 0 0   PHQ-2 Score 0 0      Advanced Directive: deferred     Follow-up: Return to clinic as needed.          GI  Lung ct/PFTs  calibrate
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
## 2019-11-06 NOTE — NURSING NOTE
Chief Complaint   Patient presents with     [facility] F/U       Ivan Sánchez CMA (AAMA) at 10:03 AM on 11/6/2019

## 2019-11-07 ENCOUNTER — MYC MEDICAL ADVICE (OUTPATIENT)
Dept: ORTHOPEDICS | Facility: CLINIC | Age: 74
End: 2019-11-07

## 2019-11-08 RX ORDER — NYSTATIN AND TRIAMCINOLONE ACETONIDE 100000; 1 [USP'U]/G; MG/G
OINTMENT TOPICAL 2 TIMES DAILY
Qty: 60 G | Refills: 1 | Status: SHIPPED | OUTPATIENT
Start: 2019-11-08 | End: 2020-01-29

## 2019-11-18 ENCOUNTER — THERAPY VISIT (OUTPATIENT)
Dept: PHYSICAL THERAPY | Facility: CLINIC | Age: 74
End: 2019-11-18
Payer: COMMERCIAL

## 2019-11-18 DIAGNOSIS — R26.9 GAIT DIFFICULTY: ICD-10-CM

## 2019-11-18 PROCEDURE — 97110 THERAPEUTIC EXERCISES: CPT | Mod: GP

## 2019-11-18 PROCEDURE — 97530 THERAPEUTIC ACTIVITIES: CPT | Mod: GP

## 2019-11-19 ENCOUNTER — OFFICE VISIT (OUTPATIENT)
Dept: OPHTHALMOLOGY | Facility: CLINIC | Age: 74
End: 2019-11-19
Attending: OPHTHALMOLOGY
Payer: COMMERCIAL

## 2019-11-19 DIAGNOSIS — Z96.1 PSEUDOPHAKIA OF BOTH EYES: Primary | ICD-10-CM

## 2019-11-19 DIAGNOSIS — E11.3293 MILD NONPROLIFERATIVE DIABETIC RETINOPATHY OF BOTH EYES WITHOUT MACULAR EDEMA ASSOCIATED WITH TYPE 2 DIABETES MELLITUS (H): ICD-10-CM

## 2019-11-19 PROCEDURE — 92015 DETERMINE REFRACTIVE STATE: CPT | Mod: ZF

## 2019-11-19 PROCEDURE — G0463 HOSPITAL OUTPT CLINIC VISIT: HCPCS | Mod: ZF

## 2019-11-19 ASSESSMENT — REFRACTION_MANIFEST
OS_SPHERE: PLANO
OD_SPHERE: PLANO
OS_ADD: +2.50
OS_CYLINDER: +0.75
OD_ADD: +2.50
OS_AXIS: 153

## 2019-11-19 ASSESSMENT — CUP TO DISC RATIO
OS_RATIO: 0.35
OD_RATIO: 0.35

## 2019-11-19 ASSESSMENT — VISUAL ACUITY
OD_SC: 20/20
OS_SC+: -1
OS_SC: 20/25
METHOD: SNELLEN - LINEAR

## 2019-11-19 ASSESSMENT — TONOMETRY
IOP_METHOD: TONOPEN
OS_IOP_MMHG: 20
OD_IOP_MMHG: 18

## 2019-11-19 ASSESSMENT — SLIT LAMP EXAM - LIDS
COMMENTS: NORMAL
COMMENTS: NORMAL

## 2019-11-19 ASSESSMENT — EXTERNAL EXAM - RIGHT EYE: OD_EXAM: NORMAL

## 2019-11-19 ASSESSMENT — EXTERNAL EXAM - LEFT EYE: OS_EXAM: NORMAL

## 2019-11-19 NOTE — NURSING NOTE
Chief Complaints and History of Present Illnesses   Patient presents with     Post Op (Ophthalmology) Both Eyes     Chief Complaint(s) and History of Present Illness(es)     Post Op (Ophthalmology) Both Eyes     Laterality: both eyes    Associated symptoms: tearing (in the mornings).  Negative for eye pain and redness    Pain scale: 0/10              Comments     He states that his vision has improved with the surgeries. He is able to see his floaters better, he tells me.  Patient denies having any eye discomfort.  HE continues to use his post operative drops as directed in his right eye.  He just finished the drops in his left eye.    Mabel Guzman, COT 3:32 PM  November 19, 2019

## 2019-11-19 NOTE — PROGRESS NOTES
Chief Complaint(s) and History of Present Illness(es)     Post Op (Ophthalmology) Both Eyes     Laterality: both eyes    Associated symptoms: tearing (in the mornings).  Negative for eye pain   and redness    Pain scale: 0/10              Comments     He states that his vision has improved with the surgeries. He is able to   see his floaters better, he tells me.  Patient denies having any eye   discomfort.  He continues to use his post operative drops as directed in   his right eye.  He just finished the drops in his left eye.    Mabel Guzman, COT 3:32 PM  November 19, 2019     Review of systems for the eyes was negative other than the pertinent positives/negatives listed in the HPI.      Assessment & Plan      Amos Walker is a 74 year old male with the following diagnoses:   1. Pseudophakia of both eyes    2. Mild nonproliferative diabetic retinopathy of both eyes without macular edema associated with type 2 diabetes mellitus (H)       BCVA 20/20 each eye (right eye plano, left eye plano +0.50)  IOP 18/20  Last HbA1c 5.6%    PLAN:  - Continue pred forte 2x daily for 1 week, 1x daily for 1 week, then stop  - Continue ketorolac 2x daily for 1 week, 1x daily for 1 week, then stop  - Continue artificial tears as needed both eyes  - Discussed MRx and dispensed per patient request  - Small MA's in periphery of right eye only; continue to monitor with annual dilated eye exams with Dr. Aranda    Patient disposition:   Return in about 7 months (around 6/15/2020) for Dilation, Annual Visit (Retina).         Ron Lawson, PGY2  Ophthalmology Resident    Attending Physician Attestation:  Complete documentation of historical and exam elements from today's encounter can be found in the full encounter summary report (not reduplicated in this progress note).  I personally obtained the chief complaint(s) and history of present illness.  I confirmed and edited as necessary the review of systems, past medical/surgical  history, family history, social history, and examination findings as documented by others; and I examined the patient myself.  I personally reviewed the relevant tests, images, and reports as documented above.  I formulated and edited as necessary the assessment and plan and discussed the findings and management plan with the patient and family. . - Dominic Purdy MD

## 2019-11-20 ENCOUNTER — OFFICE VISIT (OUTPATIENT)
Dept: PODIATRY | Facility: CLINIC | Age: 74
End: 2019-11-20
Payer: COMMERCIAL

## 2019-11-20 VITALS — OXYGEN SATURATION: 100 % | SYSTOLIC BLOOD PRESSURE: 140 MMHG | DIASTOLIC BLOOD PRESSURE: 69 MMHG | HEART RATE: 81 BPM

## 2019-11-20 DIAGNOSIS — E11.610 CHARCOT FOOT DUE TO DIABETES MELLITUS (H): ICD-10-CM

## 2019-11-20 DIAGNOSIS — E11.51 DIABETES MELLITUS WITH PERIPHERAL VASCULAR DISEASE (H): ICD-10-CM

## 2019-11-20 DIAGNOSIS — L97.511 SKIN ULCER OF RIGHT FOOT, LIMITED TO BREAKDOWN OF SKIN (H): ICD-10-CM

## 2019-11-20 DIAGNOSIS — E11.49 TYPE II OR UNSPECIFIED TYPE DIABETES MELLITUS WITH NEUROLOGICAL MANIFESTATIONS, NOT STATED AS UNCONTROLLED(250.60) (H): Primary | ICD-10-CM

## 2019-11-20 DIAGNOSIS — I87.8 VENOUS STASIS: ICD-10-CM

## 2019-11-20 PROCEDURE — 99213 OFFICE O/P EST LOW 20 MIN: CPT | Performed by: PODIATRIST

## 2019-11-20 NOTE — NURSING NOTE
Amos Walker's chief complaint for this visit includes:  Chief Complaint   Patient presents with     RECHECK     RIGHT LEG WOUND CARE     PCP: Racheal Swift    Referring Provider:  No referring provider defined for this encounter.    BP (!) 140/69 (BP Location: Right arm, Patient Position: Sitting, Cuff Size: Adult Regular)   Pulse 81   SpO2 100%   Data Unavailable     Do you need any medication refills at today's visit? No    Maya Vidal CMA
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
## 2019-11-22 ENCOUNTER — THERAPY VISIT (OUTPATIENT)
Dept: PHYSICAL THERAPY | Facility: CLINIC | Age: 74
End: 2019-11-22
Payer: COMMERCIAL

## 2019-11-22 DIAGNOSIS — R26.9 GAIT DIFFICULTY: ICD-10-CM

## 2019-11-22 PROCEDURE — 97530 THERAPEUTIC ACTIVITIES: CPT | Mod: GP | Performed by: PHYSICAL THERAPIST

## 2019-11-22 PROCEDURE — 97110 THERAPEUTIC EXERCISES: CPT | Mod: GP | Performed by: PHYSICAL THERAPIST

## 2019-11-22 NOTE — PROGRESS NOTES
Subjective:  HPI                    Objective:  System    Physical Exam    General     ROS    Assessment/Plan:    DISCHARGE REPORT    Progress reporting period is from 10.25 to 11.22.19.     SUBJECTIVE  Subjective: putting toe down to not fall, using crutches often            Changes in function: Yes, see goal flow sheet for change in function   Adverse reactions: None;   ,     The subjective and objective information are from the last SOAP note on this patient.    OBJECTIVE  Objective: indep safe crutch gait stairs and level surfaces,indep transfers NWB maintained today w/ min cues       ASSESSMENT/PLAN  Updated problem list and treatment plan: Diagnosis 1:  Gait difficulty   Decreased strength - home program  Impaired gait - home program  STG/LTGs have been met or progress has been made towards goals:  Yes (See Goal flow sheet completed today.)  Assessment of Progress: The patient's condition is improving.  The patient has met all of their long term goals.  Self Management Plans:  Patient is independent in a home treatment program.  Patient is independent in self management of symptoms.  I have re-evaluated this patient and find that the nature, scope, duration and intensity of the therapy is appropriate for the medical condition of the patient.  Amos continues to require the following intervention to meet STG and LTG's: PT intervention is no longer required to meet STG/LTG.  We will discharge this patient from PT.    Recommendations:  This patient is ready to be discharged from therapy and continue their home treatment program.    Please refer to the daily flowsheet for treatment today, total treatment time and time spent performing 1:1 timed codes.

## 2019-11-29 NOTE — TELEPHONE ENCOUNTER
RECORDS RECEIVED FROM: [facility]- Dr. Racheal Swift   DATE RECEIVED: 3/19/2020   NOTES STATUS DETAILS   OFFICE NOTE from referring provider Internal 11/6/19 Office visit with Dr. Swift    OFFICE NOTE from other specialist N/A    DISCHARGE SUMMARY from hospital Internal 10/30/19 ([facility])   OPERATIVE REPORT Internal 6/12/19, 4/18/18   MEDICATION LIST Internal         ENDOSCOPY  N/A    COLONOSCOPY Internal 6/12/19, 4/18/18 ([facility])   ERCP N/A    EUS N/A    STOOL TESTING Internal 10/31/19, 3/5/18   PERTINENT LABS Internal    PATHOLOGY REPORTS (RELATED) Internal 6/12/19   IMAGING (CT, MRI, EGD) Internal CT Abdomen Pelvis: 10/30/19  CTA Abdomen Pelvis: 7/26/18     REFERRAL INFORMATION    Date referral was placed: 3/19/2020   Date all records received: N/A   Date records were scanned into Epic: N/A   Date records were sent to Provider to review: N/A   Date and recommendation received from provider:  LETTER SENT  SCHEDULE APPOINTMENT   Date patient was contacted to schedule: 11/27/2019

## 2019-12-06 ENCOUNTER — ANCILLARY PROCEDURE (OUTPATIENT)
Dept: GENERAL RADIOLOGY | Facility: CLINIC | Age: 74
End: 2019-12-06
Attending: PODIATRIST
Payer: COMMERCIAL

## 2019-12-06 ENCOUNTER — OFFICE VISIT (OUTPATIENT)
Dept: PODIATRY | Facility: CLINIC | Age: 74
End: 2019-12-06
Payer: COMMERCIAL

## 2019-12-06 VITALS — SYSTOLIC BLOOD PRESSURE: 123 MMHG | DIASTOLIC BLOOD PRESSURE: 64 MMHG | HEART RATE: 83 BPM

## 2019-12-06 DIAGNOSIS — E11.51 DIABETES MELLITUS WITH PERIPHERAL VASCULAR DISEASE (H): ICD-10-CM

## 2019-12-06 DIAGNOSIS — E11.610 CHARCOT FOOT DUE TO DIABETES MELLITUS (H): ICD-10-CM

## 2019-12-06 DIAGNOSIS — E11.49 TYPE II OR UNSPECIFIED TYPE DIABETES MELLITUS WITH NEUROLOGICAL MANIFESTATIONS, NOT STATED AS UNCONTROLLED(250.60) (H): ICD-10-CM

## 2019-12-06 DIAGNOSIS — I87.8 VENOUS STASIS: ICD-10-CM

## 2019-12-06 DIAGNOSIS — L97.512 SKIN ULCER OF RIGHT FOOT WITH FAT LAYER EXPOSED (H): ICD-10-CM

## 2019-12-06 DIAGNOSIS — L97.511 SKIN ULCER OF RIGHT FOOT, LIMITED TO BREAKDOWN OF SKIN (H): ICD-10-CM

## 2019-12-06 DIAGNOSIS — E11.49 TYPE II OR UNSPECIFIED TYPE DIABETES MELLITUS WITH NEUROLOGICAL MANIFESTATIONS, NOT STATED AS UNCONTROLLED(250.60) (H): Primary | ICD-10-CM

## 2019-12-06 PROCEDURE — 73610 X-RAY EXAM OF ANKLE: CPT | Mod: RT | Performed by: RADIOLOGY

## 2019-12-06 PROCEDURE — 97597 DBRDMT OPN WND 1ST 20 CM/<: CPT | Performed by: PODIATRIST

## 2019-12-06 PROCEDURE — 73630 X-RAY EXAM OF FOOT: CPT | Mod: RT | Performed by: RADIOLOGY
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
## 2019-12-06 NOTE — PATIENT INSTRUCTIONS
Thanks for coming today.  Ortho/Sports Medicine Clinic  [address] [place] [address]    To schedule future appointments in Ortho Clinic, you may call [phone].    To schedule ordered imaging by your provider:   Call Central Imaging [phone]    To schedule an injection ordered by your provider:  Call Central Imaging Injection scheduling line: [phone]  [email or web address] available online at:  [email or web address]    Please call if any further questions or concerns ([phone]).  Clinic hours 8 am to 5 pm.    Return to clinic (call) if symptoms worsen or fail to improve.

## 2019-12-06 NOTE — NURSING NOTE
Amos Walker's chief complaint for this visit includes:  Chief Complaint   Patient presents with     Right Foot - Follow Up     PCP: Racheal Swift    Referring Provider:  Racheal Swift MD  [address] [place] [id number]    There were no vitals taken for this visit.  Data Unavailable     Do you need any medication refills at today's visit? No

## 2019-12-18 ENCOUNTER — OFFICE VISIT (OUTPATIENT)
Dept: PODIATRY | Facility: CLINIC | Age: 74
End: 2019-12-18
Payer: COMMERCIAL

## 2019-12-18 VITALS — SYSTOLIC BLOOD PRESSURE: 145 MMHG | HEART RATE: 99 BPM | DIASTOLIC BLOOD PRESSURE: 66 MMHG | OXYGEN SATURATION: 99 %

## 2019-12-18 DIAGNOSIS — I87.8 VENOUS STASIS: ICD-10-CM

## 2019-12-18 DIAGNOSIS — E11.610 CHARCOT FOOT DUE TO DIABETES MELLITUS (H): ICD-10-CM

## 2019-12-18 DIAGNOSIS — E11.49 TYPE II OR UNSPECIFIED TYPE DIABETES MELLITUS WITH NEUROLOGICAL MANIFESTATIONS, NOT STATED AS UNCONTROLLED(250.60) (H): Primary | ICD-10-CM

## 2019-12-18 DIAGNOSIS — E11.51 DIABETES MELLITUS WITH PERIPHERAL VASCULAR DISEASE (H): ICD-10-CM

## 2019-12-18 DIAGNOSIS — L97.511 SKIN ULCER OF RIGHT FOOT, LIMITED TO BREAKDOWN OF SKIN (H): ICD-10-CM

## 2019-12-18 PROCEDURE — 99212 OFFICE O/P EST SF 10 MIN: CPT | Performed by: PODIATRIST

## 2019-12-18 NOTE — NURSING NOTE
Amos Walker's chief complaint for this visit includes:  Chief Complaint   Patient presents with     RECHECK     Right foot recheck      PCP: Racheal Swift    Referring Provider:  No referring provider defined for this encounter.    BP (!) 145/66   Pulse 99   SpO2 99%   Data Unavailable     Do you need any medication refills at today's visit? no
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
## 2019-12-18 NOTE — PATIENT INSTRUCTIONS
Thanks for coming today.  Ortho/Sports Medicine Clinic  [address] [place] [address]    To schedule future appointments in Ortho Clinic, you may call [phone].    To schedule ordered imaging by your provider:   Call Central Imaging [phone]    To schedule an injection ordered by your provider:  Call Central Imaging Injection scheduling line: [phone]  [email or web address] available online at:  [email or web address]    Please call if any further questions or concerns ([phone]).  Clinic hours 8 am to 5 pm.    Return to clinic (call) if symptoms worsen or fail to improve.

## 2019-12-20 ENCOUNTER — TELEPHONE (OUTPATIENT)
Dept: INTERNAL MEDICINE | Facility: CLINIC | Age: 74
End: 2019-12-20

## 2019-12-20 ENCOUNTER — OFFICE VISIT (OUTPATIENT)
Dept: INTERNAL MEDICINE | Facility: CLINIC | Age: 74
End: 2019-12-20
Payer: COMMERCIAL

## 2019-12-20 VITALS — OXYGEN SATURATION: 100 % | SYSTOLIC BLOOD PRESSURE: 138 MMHG | DIASTOLIC BLOOD PRESSURE: 70 MMHG | HEART RATE: 50 BPM

## 2019-12-20 DIAGNOSIS — E11.40 TYPE 2 DIABETES, CONTROLLED, WITH NEUROPATHY (H): ICD-10-CM

## 2019-12-20 DIAGNOSIS — R21 RASH AND NONSPECIFIC SKIN ERUPTION: ICD-10-CM

## 2019-12-20 DIAGNOSIS — F17.200 TOBACCO DEPENDENCE SYNDROME: ICD-10-CM

## 2019-12-20 DIAGNOSIS — Z87.891 PERSONAL HISTORY OF NICOTINE DEPENDENCE: ICD-10-CM

## 2019-12-20 DIAGNOSIS — E11.610 CHARCOT'S ARTHROPATHY, DIABETIC (H): Primary | ICD-10-CM

## 2019-12-20 DIAGNOSIS — L98.493: ICD-10-CM

## 2019-12-20 DIAGNOSIS — I95.9 HYPOTENSION, UNSPECIFIED HYPOTENSION TYPE: ICD-10-CM

## 2019-12-20 LAB — HBA1C MFR BLD: 5.8 % (ref 0–5.6)

## 2019-12-20 RX ORDER — LISINOPRIL 2.5 MG/1
2.5 TABLET ORAL DAILY
Qty: 90 TABLET | Refills: 1 | Status: SHIPPED | OUTPATIENT
Start: 2019-12-20 | End: 2020-12-31

## 2019-12-20 ASSESSMENT — PAIN SCALES - GENERAL: PAINLEVEL: NO PAIN (0)

## 2019-12-20 NOTE — NURSING NOTE
Chief Complaint   Patient presents with     Recheck Medication     pt here for follow up       Bee Valdez CMA, EMT at 9:47 AM on 12/20/2019.

## 2019-12-20 NOTE — TELEPHONE ENCOUNTER
Patient was reached by phone and RN relayed Dr. Swift' message.  Patient was agreeable to changing dose reducing by 2 units daily.  He confirmed that he is taking 8 units at bedtime presently.  RN let him know dose would be 6 units at bedtime going forward.    Michelle Miller RN on 12/20/2019 at 5:20 PM

## 2019-12-20 NOTE — PROGRESS NOTES
[facility]  Primary [facility] Center   Racheal Swift MD  12/20/2019      Chief Complaint:   Recheck Medication  type 2 diabetes mellitus  Rash  Blood pressure     History of Present Illness:   Amos Walker is a 74 year old male with a history of  tobacco use, diabetes mellitus, hypertension, chronic kidney disease, peripheral artery disease, charcot foot, and chronic leg ulcers who presents for follow-up.    Charcot foot: Following frequently with Dr. Chappell in Podiatry, he thinks he is making progress on this.. He is in a CAM boot and can put pressure on his foot for 1 hour each day. The pretibial wound is healed but on the dorsum of his foot he still has a wound which he thinks is still present from the boot rubbing it. He will be out of the CAM boot in January when he changes to a brace.     Diabetes mellitus: His fasting sugars range 103-117 in the morning (around 10AM) with readings of ~120 2 hours post-prandial. He had a low of 63 overnight which he woke up to and corrected with food. He has some lows in the evening, his current dinner dose of Humalog is 3 unit(s). Because he is having hyperglycemia 4 hours after eating he would like to consider increasing his Lantus, currently at 8 unit(s), and decreasing Humalog. He continues Trulicity 1.5 mg weekly.     Hypotension: He had hypotension at home which was symptomatic. He checked his blood pressure and was down to <50 diastolic which he blames on low fluid intake. If he takes 2.5 mg of Lisinopril his blood pressure is in the 140s systolic and 55-60 diastolic. If he takes 5 mg his diastolic pressure is always in the 50s. He thinks this is due to weight loss, more recently he has gained some weight. He would like to titrate this on his own    Left thumb: if he pushes on his thumb he feels like he can displace the thumb. There is no pain. He has been using crutches and a walker for an extended period of time, he is slowly starting to transition to weight  bearing and will work with podiatry on getting off crutches.     Rash: There is a rash on his right hand and finger tips. He denies recent illness, medication changes, and history of similar rash. He has had cold sores in the past but not recently. There is one spot on his left hand, otherwise the rash is only on his homar hand. Some of the spots have been fading but there are new spots that have appeared in the last couple weeks. The rash is non-painful but if he grabs a hand rail it feels like there are small cracks.     Hematochezia: he has not noticed any visible blood since the episode he had in the hospital, presumed to be ischemic colitis. He has a follow-up with GI in March 2020.     Anemia: he is on iron supplements in the morning and evening. He read the manual of his CGM and it stated that the sensor can be made inaccurate if he is on aspirin and vitamin C.     Other topics discussed:  1. Cataract surgery was successful     Review of Systems:   Pertinent items are noted in HPI, remainder of complete ROS is negative.      Active Medications:     Current Outpatient Medications:      lisinopril (PRINIVIL/ZESTRIL) 2.5 MG tablet, Take 1 tablet (2.5 mg) by mouth daily, Disp: 90 tablet, Rfl: 1     ammonium lactate (LAC-HYDRIN) 12 % cream, Apply topically 2 times daily as needed for dry skin, Disp: 385 g, Rfl: 3     ascorbic acid 500 MG TABS, Take 1 tablet (500 mg) by mouth 2 times daily, Disp: 30 tablet, Rfl:      ASPIRIN PO, Take 81 mg by mouth daily, Disp: , Rfl:      blood glucose monitoring (FREESTYLE) lancets, Use to test blood sugars 4 times daily as directed., Disp: 100 each, Rfl: 11     Blood Pressure KIT, 1 Device daily, Disp: 1 kit, Rfl: 0     clopidogrel (PLAVIX) 75 MG tablet, Take 1 tablet (75 mg) by mouth every evening, Disp: 90 tablet, Rfl: 3     Continuous Blood Gluc  (FREESTYLE LATOYA 14 DAY READER) TANIA, 1 Device every hour as needed (every hour prn), Disp: 1 Device, Rfl: 0     continuous  "blood glucose monitoring (FREESTYLE LATOYA) sensor, For use with Freestyle Latoya Flash  for continuous monitioring of blood glucose levels. Replace sensor every 14 days., Disp: 4 each, Rfl: 3     dulaglutide (TRULICITY) 1.5 MG/0.5ML pen, Inject 1.5 mg Subcutaneous every 7 days, Disp: 2 mL, Rfl: 2     ferrous sulfate (IRON) 325 (65 FE) MG tablet, Take 1 tablet (325 mg) by mouth 2 times daily, Disp: 60 tablet, Rfl: 11     Gauze Pads & Dressings (OPTIFOAM) 6\"X6\" PADS, 1 Box once a week, Disp: 1 each, Rfl: 6     gentamicin (GARAMYCIN) 0.1 % external cream, Apply to left foot and leg ulcers., Disp: 30 g, Rfl: 5     insulin glargine (LANTUS PEN) 100 UNIT/ML pen, Inject 4 Units Subcutaneous At Bedtime Increase by 2 units until goal sugar of 100-130 reached, max 10 units., Disp: 3 mL, Rfl: 3     insulin lispro (HUMALOG PEN) 100 UNIT/ML pen, 4 UNITS WITH BREAKFAST, 3 UNITS WITH LUNCH AND DINNER, Disp: 3 mL, Rfl: 3     insulin pen needle (B-D U/F) 31G X 8 MM miscellaneous, USE FOUR DAILY AS DIRECTED, Disp: 100 each, Rfl: 11     ketoconazole (NIZORAL) 2 % external shampoo, Apply topically twice a week Leave on for 3-5 min then rinse off; after 2-4 weeks use only once weekly, Disp: 120 mL, Rfl: 3     ketorolac tromethamine (ACULAR-LS) 0.4 % SOLN ophthalmic solution, Apply 1 drop to eye 4 times daily Instill into operative eye(s) per physician instructions., Disp: 5 mL, Rfl: 0     ketorolac tromethamine (ACULAR-LS) 0.4 % SOLN ophthalmic solution, Apply 1 drop to eye 4 times daily Instill into operative eye(s) per physician instructions., Disp: 5 mL, Rfl: 0     lisinopril (PRINIVIL/ZESTRIL) 10 MG tablet, Take 0.5 tablets (5 mg) by mouth daily, Disp: 90 tablet, Rfl: 1     nystatin-triamcinolone (MYCOLOG) 736978-4.1 UNIT/GM-% external ointment, Apply topically 2 times daily To feet and legs., Disp: 60 g, Rfl: 1     ONETOUCH ULTRA test strip, TEST TWICE DAILY AS DIRECTED, Disp: 200 strip, Rfl: 3     order for DME, Equipment " being ordered: Roll a bout knee scooter, Disp: 1 Device, Rfl: 0     order for DME, Please measure and distribute 1 pair of 30mmHg - 40mmHg knee high open toe ulcercare compression stockings. Jobst ultrasheer or equivalent., Disp: 2 each, Rfl: 6     polyethylene glycol (MIRALAX/GLYCOLAX) powder, Take 17 g (1 capful) by mouth daily, Disp: 255 g, Rfl: 1     prednisoLONE acetate (PRED FORTE) 1 % ophthalmic suspension, Apply 1 drop to eye 4 times daily Instill into operative eye(s) per physician instructions., Disp: 5 mL, Rfl: 1     prednisoLONE acetate (PRED FORTE) 1 % ophthalmic suspension, Apply 1 drop to eye 4 times daily Instill into operative eye(s) per physician instructions., Disp: 5 mL, Rfl: 1     silver sulfADIAZINE (SILVADENE) 1 % external cream, Apply topically daily To affected areas on right foot and leg., Disp: 85 g, Rfl: 5     simvastatin (ZOCOR) 40 MG tablet, Take 1 tablet (40 mg) by mouth At Bedtime, Disp: 90 tablet, Rfl: 3     triamcinolone (KENALOG) 0.1 % external cream, Apply sparingly to left heel daily., Disp: 60 g, Rfl: 1     VITAMIN D, CHOLECALCIFEROL, PO, Take 1,000 Units by mouth 2 times daily, Disp: , Rfl:       Allergies:   Lisinopril; Neomycin; Methylchloroisothiazolinone [methylisothiazolinone]; and Povidone iodine      Past Medical History:  Anemia  Coronary artery disease  Chronic kidney disease  Emphysema of lung  Heart disease  Hyperlipidemia  Peripheral artery disease  Type 2 diabetes mellitus  Venous ulcerations  Chronic kidney disease stage 3  Hypertension  Atherosclerosis of native arteries of right leg  Charcot foot due to diabetes mellitus     Past Surgical History:  Angiogram x2  Angioplasty   Left AGUEDA  Colonoscopy x2  Cardiac surgery  Femoral endarterectomy  Cervical disc fusion  Right foot orthopedic surgery  Right lower extremity stent  Cataracts, bilateral    Family History:   Father: colon cancer, kidney disease  Mother: kidney disease  Son: myocardial infarction  Brother:  macular degeneration     Social History:     Current 0.5 PPD smoker for 50 years    Physical Exam:   /70 (BP Location: Right arm, Patient Position: Sitting, Cuff Size: Adult Regular)   Pulse 50   SpO2 100%    Constitutional: Alert, oriented, pleasant, no acute distress  Head: Normocephalic, atraumatic  Eyes: Extra-ocular movements intact, no scleral icterus  Musculoskeletal: No edema, wearing boot on right foot, ambulating with crutches  Neurologic: Alert and oriented, cranial nerves 2-12 intact.  Skin: right foot dressing in place and not removed during visit, right hand few scattered annular erythematous targetoid lesions on right fingers. No mucosal involvement.   Psychiatric: normal mentation, affect and mood      Assessment and Plan:    Charcot's arthropathy, diabetic (H)  Appears to be improving after a prolonged period of non weight bearing. He continues to follow with Dr. Mcclain.     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 He denies any oral lesion, new medications, fevers, recent illness. We discussed that the etiology is a bit uncertain, advised him to notify us if the rash spreads or if he develops mucosal involvement or fevers.     Hypotension, unspecified hypotension type  He has been hospitalized within the last year for hypotension. I did caution him about close monitoring of his blood pressure aiming for a systolic in the 120-130s. He is going to reduce his Lisinopril dose.   - lisinopril (PRINIVIL/ZESTRIL) 2.5 MG tablet  Dispense: 90 tablet; Refill: 1    Type 2 diabetes, controlled, with neuropathy (H)  A1c was 5.8 today. I am concerned that he is a bit too tightly controlled. I advised him to crosscheck his CGM with his regular glucometer, I cautioned him against increasing his insulin. I would like him to reduce lantus to 6 units today.    -  Hemoglobin A1c POCT    Tobacco dependence syndrome  - CT Chest Lung Cancer Scrn Low Dose wo    Personal history of nicotine dependence   - CT Chest Lung Cancer Scrn Low Dose wo     Follow-up: Return in about 3 months (around 3/20/2020) for Routine Visit.        Scribe Disclosure:  I, Luis Alfredo Cosme, am serving as a scribe to document services personally performed by Racheal Swift MD at this visit, based upon the provider's statements to me. All documentation has been reviewed by the aforementioned provider prior to being entered into the official medical record.    Portions of this medical record were completed by a scribe. UPON MY REVIEW AND AUTHENTICATION BY ELECTRONIC SIGNATURE, this confirms (a) I performed the applicable clinical services, and (b) the record is accurate.   Racheal Swift MD  Internal Medicine    25 min spent face to face, of which >50% time spent on counseling/coordinating care exclusive of any procedure time

## 2019-12-20 NOTE — PATIENT INSTRUCTIONS
[facility] Medication Refill Request Information:  * Please contact your pharmacy regarding ANY request for medication refills.  ** [facility] Prescription Fax = [phone]  * Please allow 3 business days for routine medication refills.  * Please allow 5 business days for controlled substance medication refills.     [facility] Test Result notification information:  *You will be notified with in 7-10 days of your appointment day regarding the results of your test.  If you are on MyChart you will be notified as soon as the provider has reviewed the results and signed off on them.    [facility]: [phone]

## 2019-12-20 NOTE — TELEPHONE ENCOUNTER
Hello,  Can we call patient to suggest insulin change?  Based on last A1c, I would suggest we consider reducing lantus by 2 units.  Please confirm dose.  Thanks,  Racheal Swift MD  Internal Medicine

## 2020-01-03 ENCOUNTER — OFFICE VISIT (OUTPATIENT)
Dept: PODIATRY | Facility: CLINIC | Age: 75
End: 2020-01-03
Payer: COMMERCIAL

## 2020-01-03 VITALS — SYSTOLIC BLOOD PRESSURE: 110 MMHG | HEART RATE: 48 BPM | DIASTOLIC BLOOD PRESSURE: 39 MMHG | OXYGEN SATURATION: 98 %

## 2020-01-03 DIAGNOSIS — I87.8 VENOUS STASIS: ICD-10-CM

## 2020-01-03 DIAGNOSIS — L97.511 SKIN ULCER OF RIGHT FOOT, LIMITED TO BREAKDOWN OF SKIN (H): ICD-10-CM

## 2020-01-03 DIAGNOSIS — E11.49 TYPE II OR UNSPECIFIED TYPE DIABETES MELLITUS WITH NEUROLOGICAL MANIFESTATIONS, NOT STATED AS UNCONTROLLED(250.60) (H): Primary | ICD-10-CM

## 2020-01-03 DIAGNOSIS — E11.610 CHARCOT FOOT DUE TO DIABETES MELLITUS (H): ICD-10-CM

## 2020-01-03 DIAGNOSIS — E11.51 DIABETES MELLITUS WITH PERIPHERAL VASCULAR DISEASE (H): ICD-10-CM

## 2020-01-03 PROCEDURE — 99213 OFFICE O/P EST LOW 20 MIN: CPT | Performed by: PODIATRIST

## 2020-01-03 NOTE — PATIENT INSTRUCTIONS
Thanks for coming today.  [facility]  [address] [place] [address]    To schedule future appointments in Ortho Clinic, you may call [phone].    To schedule ordered imaging by your provider:   Call [facility] [phone]    To schedule an injection ordered by your provider:  Call [facility] Injection scheduling line: [phone]  [facility] available online at:  [email or web address]    Please call if any further questions or concerns ([phone]).  Clinic hours 8 am to 5 pm.    Return to clinic (call) if symptoms worsen or fail to improve.

## 2020-01-03 NOTE — NURSING NOTE
Amos Walker's chief complaint for this visit includes:  Chief Complaint   Patient presents with     RECHECK     right foot/leg check      PCP: Racheal Swift    Referring Provider:  No referring provider defined for this encounter.    BP (!) 110/39   Pulse (!) 48   SpO2 98%   Data Unavailable     Do you need any medication refills at today's visit? no
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
## 2020-01-15 ENCOUNTER — OFFICE VISIT (OUTPATIENT)
Dept: PODIATRY | Facility: CLINIC | Age: 75
End: 2020-01-15
Payer: COMMERCIAL

## 2020-01-15 VITALS — SYSTOLIC BLOOD PRESSURE: 157 MMHG | HEART RATE: 81 BPM | DIASTOLIC BLOOD PRESSURE: 67 MMHG | OXYGEN SATURATION: 99 %

## 2020-01-15 DIAGNOSIS — I87.8 VENOUS STASIS: ICD-10-CM

## 2020-01-15 DIAGNOSIS — E11.610 CHARCOT FOOT DUE TO DIABETES MELLITUS (H): ICD-10-CM

## 2020-01-15 DIAGNOSIS — E11.49 TYPE II OR UNSPECIFIED TYPE DIABETES MELLITUS WITH NEUROLOGICAL MANIFESTATIONS, NOT STATED AS UNCONTROLLED(250.60) (H): Primary | ICD-10-CM

## 2020-01-15 DIAGNOSIS — E11.51 DIABETES MELLITUS WITH PERIPHERAL VASCULAR DISEASE (H): ICD-10-CM

## 2020-01-15 PROCEDURE — 99213 OFFICE O/P EST LOW 20 MIN: CPT | Performed by: PODIATRIST

## 2020-01-15 NOTE — PATIENT INSTRUCTIONS
Thanks for coming today.  Ortho/[facility] Medicine Clinic  [address] [place] [address]    To schedule future appointments in Ortho Clinic, you may call [phone].    To schedule ordered imaging by your provider:   Call Central Imaging [phone]    To schedule an injection ordered by your provider:  Call Central Imaging Injection scheduling line: [phone]  [employer] available online at:  [email or web address]    Please call if any further questions or concerns ([phone]).  Clinic hours 8 am to 5 pm.    Return to clinic (call) if symptoms worsen or fail to improve.

## 2020-01-15 NOTE — NURSING NOTE
Amos Walker's chief complaint for this visit includes:  Chief Complaint   Patient presents with     RECHECK     recheck right leg     PCP: Racheal Swift    Referring Provider:  No referring provider defined for this encounter.    BP (!) 157/67   Pulse 81   SpO2 99%   Data Unavailable     Do you need any medication refills at today's visit? no

## 2020-01-15 NOTE — LETTER
1/15/2020         RE: Amos Walker  [address]  [place] [address]        Dear Colleague,    Thank you for referring your patient, Amos Walker, to the [facility]. Please see a copy of my visit note below.    Past Medical History:   Diagnosis Date     Anemia      CAD (coronary artery disease)     2V CAD involving LAD and RCA, s/p DESx4 in 3/18     CKD (chronic kidney disease) stage 3, GFR 30-59 ml/min (H)      Colon polyp      Emphysema of lung (H)     noted on CT     Heart disease      HTN (hypertension)      Hyperlipidemia      MRSA cellulitis of right foot     in past.      PAD (peripheral artery disease) (H) 09/2018    s/p R femoral enarterectomy and stenting      Tobacco use     50+ pack     Type 2 diabetes mellitus (H)     for 25 yrs.  on insulin and starlix     Venous ulcer (H)      Lab Results   Component Value Date    A1C 5.8 12/20/2019    A1C 5.6 10/04/2019    A1C 6.7 03/27/2019    A1C 7.2 11/16/2018    A1C 6.6 06/21/2018             SUBJECTIVE FINDINGS:  74-year-old male returns to clinic for ulcer, right anterior leg, right lateral foot and Charcot foot, right.  He relates he has been walking with his Arizona brace exclusively in his shoe.  He has been wearing that 24 hours a day, changing the dressing every other day.  Relates his lateral foot broke open a little more.  He tried Medihoney but it seemed to worsen it.  He has been walking with a cane and the anterior leg is kind of broken open a little bit.        OBJECTIVE FINDINGS:  DP and PT are 2/4 right.  He has a right lateral fifth metatarsal base area, hypergranulation tissue, ulceration that is about 0.4 cm.  It is through the dermis.  No erythema, minimal edema, minimal serosanguineous drainage, no odor, no calor.  He has a right anterior leg ulcer that is through the epidermis.  It is about 0.5 cm in diameter.  There is minimal drainage, minimal erythema, some venous congestion, no odor, no calor.  There is a  Charcot deformity.        ASSESSMENT/PLAN:  Ulcer, right anterior leg.  This is reopened.  Ulcer, right lateral foot.  This has regressed a bit.  Charcot foot is stable.  Diagnosis and treatment options discussed with him.  He is diabetic with peripheral neuropathy and vascular disease.  Continue the Arizona brace.  I am going to have him apply Wound Veil, Hydrofera Blue and a sterile dressing to both ulcer sites with a Steri-Strip for added compression over the lateral foot lesion.  Wrap with Kerlix and Coban.  Return to clinic in 1 week.         Again, thank you for allowing me to participate in the care of your patient.        Sincerely,        Brayan Mcclain DPM

## 2020-01-15 NOTE — PROGRESS NOTES
Past Medical History:   Diagnosis Date     Anemia      CAD (coronary artery disease)     2V CAD involving LAD and RCA, s/p DESx4 in 3/18     CKD (chronic kidney disease) stage 3, GFR 30-59 ml/min (H)      Colon polyp      Emphysema of lung (H)     noted on CT     Heart disease      HTN (hypertension)      Hyperlipidemia      MRSA cellulitis of right foot     in past.      PAD (peripheral artery disease) (H) 09/2018    s/p R femoral enarterectomy and stenting      Tobacco use     50+ pack     Type 2 diabetes mellitus (H)     for 25 yrs.  on insulin and starlix     Venous ulcer (H)      Lab Results   Component Value Date    A1C 5.8 12/20/2019    A1C 5.6 10/04/2019    A1C 6.7 03/27/2019    A1C 7.2 11/16/2018    A1C 6.6 06/21/2018             SUBJECTIVE FINDINGS:  74-year-old male returns to clinic for ulcer, right anterior leg, right lateral foot and Charcot foot, right.  He relates he has been walking with his Arizona brace exclusively in his shoe.  He has been wearing that 24 hours a day, changing the dressing every other day.  Relates his lateral foot broke open a little more.  He tried Medihoney but it seemed to worsen it.  He has been walking with a cane and the anterior leg is kind of broken open a little bit.        OBJECTIVE FINDINGS:  DP and PT are 2/4 right.  He has a right lateral fifth metatarsal base area, hypergranulation tissue, ulceration that is about 0.4 cm.  It is through the dermis.  No erythema, minimal edema, minimal serosanguineous drainage, no odor, no calor.  He has a right anterior leg ulcer that is through the epidermis.  It is about 0.5 cm in diameter.  There is minimal drainage, minimal erythema, some venous congestion, no odor, no calor.  There is a Charcot deformity.        ASSESSMENT/PLAN:  Ulcer, right anterior leg.  This is reopened.  Ulcer, right lateral foot.  This has regressed a bit.  Charcot foot is stable.  Diagnosis and treatment options discussed with him.  He is diabetic with  peripheral neuropathy and vascular disease.  Continue the Arizona brace.  I am going to have him apply Wound Veil, Hydrofera Blue and a sterile dressing to both ulcer sites with a Steri-Strip for added compression over the lateral foot lesion.  Wrap with Kerlix and Coban.  Return to clinic in 1 week.

## 2020-01-16 DIAGNOSIS — E11.51 TYPE 2 DIABETES MELLITUS WITH DIABETIC PERIPHERAL ANGIOPATHY WITHOUT GANGRENE, WITH LONG-TERM CURRENT USE OF INSULIN (H): ICD-10-CM

## 2020-01-16 DIAGNOSIS — Z79.4 TYPE 2 DIABETES MELLITUS WITH DIABETIC PERIPHERAL ANGIOPATHY WITHOUT GANGRENE, WITH LONG-TERM CURRENT USE OF INSULIN (H): ICD-10-CM

## 2020-01-17 NOTE — TELEPHONE ENCOUNTER
TRULICITY 1.5MG/0.5ML SDP 4X0.5ML    Last Written Prescription Date:  10/1/2019  Last Fill Quantity: 2 mL,   # refills: 2  Last Office Visit : 12/20/2019  Future Office visit:  3/23/2020    Routing refill request to provider for review/approval because:  Blood pressure out of range     90 ángel sent to pharmacy.  Referring B/P readings to Provider for review.     Change B/P Med? Follow up blood pressure check??     BP Readings from Last 3 Encounters:   01/15/20 (!) 157/67   01/03/20 (!) 110/39   12/20/19 138/70         Celeste Arceo RN  Central Triage Red Flags/Med Refills

## 2020-01-24 ENCOUNTER — OFFICE VISIT (OUTPATIENT)
Dept: PODIATRY | Facility: CLINIC | Age: 75
End: 2020-01-24
Payer: COMMERCIAL

## 2020-01-24 VITALS
TEMPERATURE: 97.5 F | HEART RATE: 108 BPM | OXYGEN SATURATION: 98 % | SYSTOLIC BLOOD PRESSURE: 150 MMHG | DIASTOLIC BLOOD PRESSURE: 75 MMHG

## 2020-01-24 DIAGNOSIS — L97.512 SKIN ULCER OF RIGHT FOOT WITH FAT LAYER EXPOSED (H): ICD-10-CM

## 2020-01-24 DIAGNOSIS — I87.8 VENOUS STASIS: ICD-10-CM

## 2020-01-24 DIAGNOSIS — E11.51 DIABETES MELLITUS WITH PERIPHERAL VASCULAR DISEASE (H): ICD-10-CM

## 2020-01-24 DIAGNOSIS — E11.49 TYPE II OR UNSPECIFIED TYPE DIABETES MELLITUS WITH NEUROLOGICAL MANIFESTATIONS, NOT STATED AS UNCONTROLLED(250.60) (H): Primary | ICD-10-CM

## 2020-01-24 DIAGNOSIS — E11.610 CHARCOT FOOT DUE TO DIABETES MELLITUS (H): ICD-10-CM

## 2020-01-24 DIAGNOSIS — L08.9 INFECTION OF TOE: ICD-10-CM

## 2020-01-24 PROCEDURE — 99213 OFFICE O/P EST LOW 20 MIN: CPT | Performed by: PODIATRIST

## 2020-01-24 RX ORDER — CEFDINIR 300 MG/1
300 CAPSULE ORAL 2 TIMES DAILY
Qty: 28 CAPSULE | Refills: 0 | Status: ON HOLD | OUTPATIENT
Start: 2020-01-24 | End: 2020-02-06
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
## 2020-01-24 NOTE — PATIENT INSTRUCTIONS
Thanks for coming today.  Ortho/Sports Medicine Clinic  [address] [place] [address]    To schedule future appointments in Ortho Clinic, you may call [phone].    To schedule ordered imaging by your provider:   Call Central Imaging [phone]    To schedule an injection ordered by your provider:  Call Central Imaging Injection scheduling line: [phone]  [employer] available online at:  [email or web address]    Please call if any further questions or concerns ([phone]).  Clinic hours 8 am to 5 pm.    Return to clinic (call) if symptoms worsen or fail to improve.
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
## 2020-01-24 NOTE — NURSING NOTE
Amos Walker's chief complaint for this visit includes:  Chief Complaint   Patient presents with     RECHECK     right leg check      PCP: Racheal Swift    Referring Provider:  No referring provider defined for this encounter.    BP (!) 150/75   Pulse 108   Temp 97.5  F (36.4  C) (Oral)   SpO2 98%   Data Unavailable     Do you need any medication refills at today's visit? no

## 2020-01-26 DIAGNOSIS — E11.40 TYPE 2 DIABETES, CONTROLLED, WITH NEUROPATHY (H): ICD-10-CM

## 2020-01-27 RX ORDER — INSULIN GLARGINE 100 [IU]/ML
INJECTION, SOLUTION SUBCUTANEOUS
Qty: 3 ML | Refills: 3 | OUTPATIENT
Start: 2020-01-27

## 2020-01-27 NOTE — TELEPHONE ENCOUNTER
LANTUS SOLOSTAR 100 UNIT/ML soln      Requested: INJECT 4 UNITS UNDER THE SKIN EVERY NIGHT AT BEDTIME. INCREASE BY 2 UNITS UNTIL GOAL SUGAR -130 IS REACHED, MAX OF 10 UNITS   this med discontinue by MD on 12-20-19            (RF denied- fax to pharm)   new rx was written 1-20-19 as local print  Sig - Route: Inject 6 Units Subcutaneous At Bedtime - Subcutaneous   Class: No Print Out     12-20-19 for 3 ml w/3 RF    Rx RF as previous written per order by Dr. Swift, (did not transmit to pharmacy)      Last clinic note:Type 2 diabetes, controlled, with neuropathy (H)  A1c was 5.8 today. I am concerned that he is a bit too tightly controlled. I advised him to crosscheck his CGM with his regular glucometer, I cautioned him against increasing his insulin. I would like him to reduce lantus to 6 units today.    - Hemoglobin A1c POCT

## 2020-01-29 ENCOUNTER — TELEPHONE (OUTPATIENT)
Dept: PODIATRY | Facility: CLINIC | Age: 75
End: 2020-01-29

## 2020-01-29 ENCOUNTER — TELEPHONE (OUTPATIENT)
Dept: INTERNAL MEDICINE | Facility: CLINIC | Age: 75
End: 2020-01-29

## 2020-01-29 ENCOUNTER — APPOINTMENT (OUTPATIENT)
Dept: CT IMAGING | Facility: CLINIC | Age: 75
DRG: 871 | End: 2020-01-29
Payer: COMMERCIAL

## 2020-01-29 ENCOUNTER — APPOINTMENT (OUTPATIENT)
Dept: GENERAL RADIOLOGY | Facility: CLINIC | Age: 75
DRG: 871 | End: 2020-01-29
Attending: EMERGENCY MEDICINE
Payer: COMMERCIAL

## 2020-01-29 ENCOUNTER — HOSPITAL ENCOUNTER (INPATIENT)
Facility: CLINIC | Age: 75
LOS: 8 days | Discharge: HOME HEALTH | DRG: 871 | End: 2020-02-06
Attending: EMERGENCY MEDICINE | Admitting: INTERNAL MEDICINE
Payer: COMMERCIAL

## 2020-01-29 DIAGNOSIS — J18.9 PNEUMONIA OF UPPER LOBE DUE TO INFECTIOUS ORGANISM, UNSPECIFIED LATERALITY: Primary | ICD-10-CM

## 2020-01-29 DIAGNOSIS — E11.9 TYPE 2 DIABETES MELLITUS WITHOUT COMPLICATION, WITH LONG-TERM CURRENT USE OF INSULIN (H): ICD-10-CM

## 2020-01-29 DIAGNOSIS — Z79.4 TYPE 2 DIABETES MELLITUS WITHOUT COMPLICATION, WITH LONG-TERM CURRENT USE OF INSULIN (H): ICD-10-CM

## 2020-01-29 DIAGNOSIS — R50.9 FEVER, UNSPECIFIED FEVER CAUSE: ICD-10-CM

## 2020-01-29 DIAGNOSIS — E87.1 HYPOSMOLALITY SYNDROME: ICD-10-CM

## 2020-01-29 DIAGNOSIS — J18.8 OTHER PNEUMONIA, UNSPECIFIED ORGANISM: ICD-10-CM

## 2020-01-29 DIAGNOSIS — E86.0 DEHYDRATION: ICD-10-CM

## 2020-01-29 DIAGNOSIS — E11.40 TYPE 2 DIABETES, CONTROLLED, WITH NEUROPATHY (H): ICD-10-CM

## 2020-01-29 DIAGNOSIS — E87.1 HYPONATREMIA: ICD-10-CM

## 2020-01-29 DIAGNOSIS — R50.9 HYPERTHERMIA-INDUCED DEFECT: ICD-10-CM

## 2020-01-29 DIAGNOSIS — J18.9 PNEUMONIA OF RIGHT LOWER LOBE DUE TO INFECTIOUS ORGANISM: ICD-10-CM

## 2020-01-29 LAB
ALBUMIN SERPL-MCNC: 2 G/DL (ref 3.4–5)
ALP SERPL-CCNC: 173 U/L (ref 40–150)
ALT SERPL W P-5'-P-CCNC: 26 U/L (ref 0–70)
ANION GAP SERPL CALCULATED.3IONS-SCNC: 6 MMOL/L (ref 3–14)
AST SERPL W P-5'-P-CCNC: 68 U/L (ref 0–45)
BASOPHILS # BLD AUTO: 0 10E9/L (ref 0–0.2)
BASOPHILS NFR BLD AUTO: 0 %
BILIRUB SERPL-MCNC: 1 MG/DL (ref 0.2–1.3)
BUN SERPL-MCNC: 68 MG/DL (ref 7–30)
BURR CELLS BLD QL SMEAR: SLIGHT
CALCIUM SERPL-MCNC: 7.8 MG/DL (ref 8.5–10.1)
CHLORIDE SERPL-SCNC: 96 MMOL/L (ref 94–109)
CO2 SERPL-SCNC: 25 MMOL/L (ref 20–32)
CREAT SERPL-MCNC: 1.9 MG/DL (ref 0.66–1.25)
DIFFERENTIAL METHOD BLD: ABNORMAL
EOSINOPHIL # BLD AUTO: 0 10E9/L (ref 0–0.7)
EOSINOPHIL NFR BLD AUTO: 0 %
ERYTHROCYTE [DISTWIDTH] IN BLOOD BY AUTOMATED COUNT: 13.2 % (ref 10–15)
FLUAV+FLUBV AG SPEC QL: NEGATIVE
FLUAV+FLUBV AG SPEC QL: NEGATIVE
GFR SERPL CREATININE-BSD FRML MDRD: 34 ML/MIN/{1.73_M2}
GLUCOSE BLDC GLUCOMTR-MCNC: 273 MG/DL (ref 70–99)
GLUCOSE SERPL-MCNC: 388 MG/DL (ref 70–99)
HCT VFR BLD AUTO: 27.3 % (ref 40–53)
HGB BLD-MCNC: 9.2 G/DL (ref 13.3–17.7)
LACTATE BLD-SCNC: 1.3 MMOL/L (ref 0.7–2)
LYMPHOCYTES # BLD AUTO: 0 10E9/L (ref 0.8–5.3)
LYMPHOCYTES NFR BLD AUTO: 0.9 %
MCH RBC QN AUTO: 31.9 PG (ref 26.5–33)
MCHC RBC AUTO-ENTMCNC: 33.7 G/DL (ref 31.5–36.5)
MCV RBC AUTO: 95 FL (ref 78–100)
MONOCYTES # BLD AUTO: 0 10E9/L (ref 0–1.3)
MONOCYTES NFR BLD AUTO: 0 %
NEUTROPHILS # BLD AUTO: 5.5 10E9/L (ref 1.6–8.3)
NEUTROPHILS NFR BLD AUTO: 99.1 %
PLATELET # BLD AUTO: 117 10E9/L (ref 150–450)
PLATELET # BLD EST: ABNORMAL 10*3/UL
POIKILOCYTOSIS BLD QL SMEAR: SLIGHT
POLYCHROMASIA BLD QL SMEAR: SLIGHT
POTASSIUM SERPL-SCNC: 3.7 MMOL/L (ref 3.4–5.3)
PROT SERPL-MCNC: 6 G/DL (ref 6.8–8.8)
RBC # BLD AUTO: 2.88 10E12/L (ref 4.4–5.9)
SODIUM SERPL-SCNC: 128 MMOL/L (ref 133–144)
SPECIMEN SOURCE: NORMAL
WBC # BLD AUTO: 5.5 10E9/L (ref 4–11)

## 2020-01-29 PROCEDURE — 96365 THER/PROPH/DIAG IV INF INIT: CPT | Performed by: EMERGENCY MEDICINE

## 2020-01-29 PROCEDURE — 80053 COMPREHEN METABOLIC PANEL: CPT | Performed by: EMERGENCY MEDICINE

## 2020-01-29 PROCEDURE — 99285 EMERGENCY DEPT VISIT HI MDM: CPT | Mod: 25 | Performed by: EMERGENCY MEDICINE

## 2020-01-29 PROCEDURE — 25800030 ZZH RX IP 258 OP 636: Performed by: EMERGENCY MEDICINE

## 2020-01-29 PROCEDURE — 87800 DETECT AGNT MULT DNA DIREC: CPT | Performed by: EMERGENCY MEDICINE

## 2020-01-29 PROCEDURE — 87077 CULTURE AEROBIC IDENTIFY: CPT | Performed by: EMERGENCY MEDICINE

## 2020-01-29 PROCEDURE — 71046 X-RAY EXAM CHEST 2 VIEWS: CPT

## 2020-01-29 PROCEDURE — 87804 INFLUENZA ASSAY W/OPTIC: CPT | Performed by: EMERGENCY MEDICINE

## 2020-01-29 PROCEDURE — 25000132 ZZH RX MED GY IP 250 OP 250 PS 637: Performed by: EMERGENCY MEDICINE

## 2020-01-29 PROCEDURE — 25000128 H RX IP 250 OP 636: Performed by: EMERGENCY MEDICINE

## 2020-01-29 PROCEDURE — 96367 TX/PROPH/DG ADDL SEQ IV INF: CPT | Performed by: EMERGENCY MEDICINE

## 2020-01-29 PROCEDURE — 85025 COMPLETE CBC W/AUTO DIFF WBC: CPT | Performed by: EMERGENCY MEDICINE

## 2020-01-29 PROCEDURE — 99207 ZZC CDG-HISTORY COMP: MEETS EXP. PROB FOCUSED- DOWN CODED LACK OF PFSH: CPT | Performed by: INTERNAL MEDICINE

## 2020-01-29 PROCEDURE — 71250 CT THORAX DX C-: CPT

## 2020-01-29 PROCEDURE — 87040 BLOOD CULTURE FOR BACTERIA: CPT | Performed by: EMERGENCY MEDICINE

## 2020-01-29 PROCEDURE — 12000001 ZZH R&B MED SURG/OB UMMC

## 2020-01-29 PROCEDURE — 00000146 ZZHCL STATISTIC GLUCOSE BY METER IP

## 2020-01-29 PROCEDURE — 87186 SC STD MICRODIL/AGAR DIL: CPT | Performed by: EMERGENCY MEDICINE

## 2020-01-29 PROCEDURE — 83605 ASSAY OF LACTIC ACID: CPT | Performed by: EMERGENCY MEDICINE

## 2020-01-29 PROCEDURE — 99221 1ST HOSP IP/OBS SF/LOW 40: CPT | Mod: GC | Performed by: INTERNAL MEDICINE

## 2020-01-29 PROCEDURE — 99285 EMERGENCY DEPT VISIT HI MDM: CPT | Mod: Z6 | Performed by: EMERGENCY MEDICINE

## 2020-01-29 RX ORDER — NICOTINE POLACRILEX 4 MG
15-30 LOZENGE BUCCAL
Status: DISCONTINUED | OUTPATIENT
Start: 2020-01-29 | End: 2020-02-06 | Stop reason: HOSPADM

## 2020-01-29 RX ORDER — LIDOCAINE 40 MG/G
CREAM TOPICAL
Status: DISCONTINUED | OUTPATIENT
Start: 2020-01-29 | End: 2020-02-06 | Stop reason: HOSPADM

## 2020-01-29 RX ORDER — SIMVASTATIN 20 MG
40 TABLET ORAL AT BEDTIME
Status: DISCONTINUED | OUTPATIENT
Start: 2020-01-29 | End: 2020-02-06 | Stop reason: HOSPADM

## 2020-01-29 RX ORDER — ASPIRIN 81 MG/1
81 TABLET, CHEWABLE ORAL DAILY
Status: DISCONTINUED | OUTPATIENT
Start: 2020-01-30 | End: 2020-02-06 | Stop reason: HOSPADM

## 2020-01-29 RX ORDER — AMOXICILLIN 250 MG
1 CAPSULE ORAL 2 TIMES DAILY
Status: DISCONTINUED | OUTPATIENT
Start: 2020-01-29 | End: 2020-02-06 | Stop reason: HOSPADM

## 2020-01-29 RX ORDER — SODIUM CHLORIDE 9 MG/ML
1000 INJECTION, SOLUTION INTRAVENOUS CONTINUOUS
Status: DISCONTINUED | OUTPATIENT
Start: 2020-01-29 | End: 2020-01-31

## 2020-01-29 RX ORDER — ASPIRIN 81 MG/1
81 TABLET ORAL DAILY
Status: ON HOLD | COMMUNITY
End: 2023-07-03

## 2020-01-29 RX ORDER — ACETAMINOPHEN 325 MG/1
975 TABLET ORAL ONCE
Status: COMPLETED | OUTPATIENT
Start: 2020-01-29 | End: 2020-01-29

## 2020-01-29 RX ORDER — FERROUS SULFATE 325(65) MG
325 TABLET ORAL 2 TIMES DAILY
Status: DISCONTINUED | OUTPATIENT
Start: 2020-01-29 | End: 2020-01-30

## 2020-01-29 RX ORDER — CEFTRIAXONE 2 G/1
2 INJECTION, POWDER, FOR SOLUTION INTRAMUSCULAR; INTRAVENOUS ONCE
Status: COMPLETED | OUTPATIENT
Start: 2020-01-29 | End: 2020-01-29

## 2020-01-29 RX ORDER — DEXTROSE MONOHYDRATE 25 G/50ML
25-50 INJECTION, SOLUTION INTRAVENOUS
Status: DISCONTINUED | OUTPATIENT
Start: 2020-01-29 | End: 2020-02-06 | Stop reason: HOSPADM

## 2020-01-29 RX ORDER — ACETAMINOPHEN 325 MG/1
650 TABLET ORAL EVERY 4 HOURS PRN
Status: DISCONTINUED | OUTPATIENT
Start: 2020-01-29 | End: 2020-02-06 | Stop reason: HOSPADM

## 2020-01-29 RX ORDER — ASCORBIC ACID 500 MG
500 TABLET ORAL 2 TIMES DAILY
Status: DISCONTINUED | OUTPATIENT
Start: 2020-01-29 | End: 2020-01-30

## 2020-01-29 RX ORDER — VITAMIN B COMPLEX
1000 TABLET ORAL 2 TIMES DAILY
Status: DISCONTINUED | OUTPATIENT
Start: 2020-01-29 | End: 2020-02-06 | Stop reason: HOSPADM

## 2020-01-29 RX ORDER — AMMONIUM LACTATE 12 G/100G
CREAM TOPICAL 2 TIMES DAILY PRN
Status: DISCONTINUED | OUTPATIENT
Start: 2020-01-29 | End: 2020-02-06 | Stop reason: HOSPADM

## 2020-01-29 RX ORDER — CLOPIDOGREL BISULFATE 75 MG/1
75 TABLET ORAL EVERY EVENING
Status: DISCONTINUED | OUTPATIENT
Start: 2020-01-29 | End: 2020-02-06 | Stop reason: HOSPADM

## 2020-01-29 RX ORDER — AMOXICILLIN 250 MG
2 CAPSULE ORAL 2 TIMES DAILY
Status: DISCONTINUED | OUTPATIENT
Start: 2020-01-29 | End: 2020-02-06 | Stop reason: HOSPADM

## 2020-01-29 RX ORDER — NALOXONE HYDROCHLORIDE 0.4 MG/ML
.1-.4 INJECTION, SOLUTION INTRAMUSCULAR; INTRAVENOUS; SUBCUTANEOUS
Status: DISCONTINUED | OUTPATIENT
Start: 2020-01-29 | End: 2020-02-06 | Stop reason: HOSPADM

## 2020-01-29 RX ADMIN — SODIUM CHLORIDE 1000 ML: 9 INJECTION, SOLUTION INTRAVENOUS at 19:04

## 2020-01-29 RX ADMIN — CEFTRIAXONE SODIUM 2 G: 2 INJECTION, POWDER, FOR SOLUTION INTRAMUSCULAR; INTRAVENOUS at 18:38

## 2020-01-29 RX ADMIN — SODIUM CHLORIDE 1000 ML: 9 INJECTION, SOLUTION INTRAVENOUS at 18:17

## 2020-01-29 RX ADMIN — ACETAMINOPHEN 975 MG: 325 TABLET, FILM COATED ORAL at 18:23

## 2020-01-29 RX ADMIN — AZITHROMYCIN MONOHYDRATE 500 MG: 500 INJECTION, POWDER, LYOPHILIZED, FOR SOLUTION INTRAVENOUS at 19:01

## 2020-01-29 ASSESSMENT — ENCOUNTER SYMPTOMS
DYSURIA: 0
WEAKNESS: 1
DIARRHEA: 0
FEVER: 0
WOUND: 1
BACK PAIN: 0
HEADACHES: 0
HEMATURIA: 0
LIGHT-HEADEDNESS: 0
COUGH: 1
NAUSEA: 0
FATIGUE: 1
PALPITATIONS: 0
CHILLS: 0
VOMITING: 0

## 2020-01-29 NOTE — TELEPHONE ENCOUNTER
Called and talked with Danielle. She states it is patient Right foot they are concerned about.  They have not assessed the wound since Friday as patient has not been feeling well to change the dressing.  Tempatures started on Sunday evening 103.9, Monday 103.4, Tuesday 101.2 Wed 99.9. All of these tempatures were taken off ibuprofen.  He did get up and eat some last night and he states he is feeling a little better today.  Yesterday patient was unable to walk to bathroom without assistance.  Danielle was going to make him breakfast soon and get him up and will see how he is doing after that.  Will discuss with Dr. Mcclain if he still feels that the ER is neeeded.

## 2020-01-29 NOTE — TELEPHONE ENCOUNTER
Called back to check on patient. He is currently eating breakfast, wake and alert. He has not gotten out of bed yet, but Danielle will try shortly.  His BG is in the 200's patient patient just received insulin.  Patient also states he is feeling better this am.  He is going to assess his left foot today.  We discussed if there is increase in redness, drainage or odor to go to the ER to be evaluated.  Appointment made for them in clinic on Monday.  They will call with any other questions.  Ashley Ellison RN

## 2020-01-29 NOTE — TELEPHONE ENCOUNTER
[facility] Call Center    Phone Message    May a detailed message be left on voicemail: no    Reason for Call: Symptoms or Concerns     If patient has red-flag symptoms, warm transfer to triage line    Current symptom or concern: Wound on left foot.  Pt's wife called 911 on  regarding patients possible infection of foot and pt unable to get out of bed. Sleeping 22 hours a day. Danielle is asking for a call back from Dr Mcclain or team regarding symptoms and appt if needed.   Danielle is going to a  and has to leave the house at 10:15 this morning. Asking for a call back before then.   [phone]        Action Taken: Message routed to:  Adult Clinics: Podiatry p [phone]`

## 2020-01-29 NOTE — TELEPHONE ENCOUNTER
Better this am, temp down overnight, gave him two advil last night.   Missed several doses of insulin bouts of shaking.  Wednesday saw martina. Sick since Saturday.   Called medics, felt he had a touch of the flu. Lucid, no vomit no diarrhea. Sleeping 20 hrs a day, has eaten some meals. BS Sunday afternoon 205, low as 129 Sunday night. High as 264 last night, 254 this am has had insulin.   She feels he is improving and wants to wait and see. I advised with the stairs if she cannot get him down safely to get to an eval she needs to call 911.  Suggested a visit but she wants to wait and see Deja Bueno Paramedic on 1/29/2020 at 9:22 AM
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
## 2020-01-29 NOTE — ED NOTES
Bed: ED02  Expected date: 1/29/20  Expected time:   Means of arrival: Ambulance  Comments:  Tong [address]  74 y M  Possible Sepsis, Foot Infection  Yellow

## 2020-01-29 NOTE — ED TRIAGE NOTES
BIBA from home, c/o fevers since Sunday, T max at home 102.2  Weakness, cough. BG in 376 per EMS.  Ability to follow direction waxes and wanes, Oriented x4 otherwise  Wife on R side of foot that wife believies may be infected. HR 's, 92% RA. 4L NC en route  400cc NS given

## 2020-01-29 NOTE — TELEPHONE ENCOUNTER
[facility]    Phone Message    May a detailed message be left on voicemail: no    Reason for Call: Other: Pt's wife called to report symptoms of low grade fever and high blood sugar of 254 as of this morning; over the last 4 days it has also been low and been fluctuating. Pt has been skipping some insulin when he shouldn't have been. Pt has been trembling/shaking. Danielle wants a call back to discuss over the phone because the Pt is not really that mobile and they have a flight of 15 stairs. She would like a call back to discuss these issues over the phone; she is available this morning until 10am due to leaving for a  today, and will be back by 2:30pm. Please call Danielle back.     Action Taken: Message routed to:  [facility] ([facility]

## 2020-01-30 ENCOUNTER — TELEPHONE (OUTPATIENT)
Dept: OPHTHALMOLOGY | Facility: CLINIC | Age: 75
End: 2020-01-30

## 2020-01-30 LAB
ANION GAP SERPL CALCULATED.3IONS-SCNC: 7 MMOL/L (ref 3–14)
ANION GAP SERPL CALCULATED.3IONS-SCNC: 8 MMOL/L (ref 3–14)
BUN SERPL-MCNC: 67 MG/DL (ref 7–30)
BUN SERPL-MCNC: 68 MG/DL (ref 7–30)
C PNEUM DNA SPEC QL NAA+PROBE: NOT DETECTED
CALCIUM SERPL-MCNC: 7.8 MG/DL (ref 8.5–10.1)
CALCIUM SERPL-MCNC: 7.8 MG/DL (ref 8.5–10.1)
CHLORIDE SERPL-SCNC: 103 MMOL/L (ref 94–109)
CHLORIDE SERPL-SCNC: 104 MMOL/L (ref 94–109)
CO2 SERPL-SCNC: 20 MMOL/L (ref 20–32)
CO2 SERPL-SCNC: 22 MMOL/L (ref 20–32)
CREAT SERPL-MCNC: 1.89 MG/DL (ref 0.66–1.25)
CREAT SERPL-MCNC: 1.97 MG/DL (ref 0.66–1.25)
CREAT UR-MCNC: 103 MG/DL
ERYTHROCYTE [DISTWIDTH] IN BLOOD BY AUTOMATED COUNT: 13.1 % (ref 10–15)
FLUAV H1 2009 PAND RNA SPEC QL NAA+PROBE: NOT DETECTED
FLUAV H1 RNA SPEC QL NAA+PROBE: NOT DETECTED
FLUAV H3 RNA SPEC QL NAA+PROBE: NOT DETECTED
FLUAV RNA SPEC QL NAA+PROBE: NOT DETECTED
FLUBV RNA SPEC QL NAA+PROBE: NOT DETECTED
FRACT EXCRET NA UR+SERPL-RTO: 0.2 %
GFR SERPL CREATININE-BSD FRML MDRD: 33 ML/MIN/{1.73_M2}
GFR SERPL CREATININE-BSD FRML MDRD: 34 ML/MIN/{1.73_M2}
GLUCOSE BLDC GLUCOMTR-MCNC: 209 MG/DL (ref 70–99)
GLUCOSE BLDC GLUCOMTR-MCNC: 222 MG/DL (ref 70–99)
GLUCOSE BLDC GLUCOMTR-MCNC: 275 MG/DL (ref 70–99)
GLUCOSE BLDC GLUCOMTR-MCNC: 275 MG/DL (ref 70–99)
GLUCOSE BLDC GLUCOMTR-MCNC: 296 MG/DL (ref 70–99)
GLUCOSE SERPL-MCNC: 226 MG/DL (ref 70–99)
GLUCOSE SERPL-MCNC: 262 MG/DL (ref 70–99)
HADV DNA SPEC QL NAA+PROBE: NOT DETECTED
HCOV PNL SPEC NAA+PROBE: NOT DETECTED
HCT VFR BLD AUTO: 27 % (ref 40–53)
HGB BLD-MCNC: 8.9 G/DL (ref 13.3–17.7)
HMPV RNA SPEC QL NAA+PROBE: NOT DETECTED
HPIV1 RNA SPEC QL NAA+PROBE: NOT DETECTED
HPIV2 RNA SPEC QL NAA+PROBE: NOT DETECTED
HPIV3 RNA SPEC QL NAA+PROBE: NOT DETECTED
HPIV4 RNA SPEC QL NAA+PROBE: NOT DETECTED
L PNEUMO1 AG UR QL IA: ABNORMAL
L PNEUMO1 AG UR QL IA: ABNORMAL
M PNEUMO DNA SPEC QL NAA+PROBE: NOT DETECTED
MAGNESIUM SERPL-MCNC: 2.1 MG/DL (ref 1.6–2.3)
MCH RBC QN AUTO: 31.4 PG (ref 26.5–33)
MCHC RBC AUTO-ENTMCNC: 33 G/DL (ref 31.5–36.5)
MCV RBC AUTO: 95 FL (ref 78–100)
MICROBIOLOGIST REVIEW: NORMAL
OSMOLALITY UR: 495 MMOL/KG (ref 100–1200)
PHOSPHATE SERPL-MCNC: 2.8 MG/DL (ref 2.5–4.5)
PLATELET # BLD AUTO: 111 10E9/L (ref 150–450)
POTASSIUM SERPL-SCNC: 3.2 MMOL/L (ref 3.4–5.3)
POTASSIUM SERPL-SCNC: 3.4 MMOL/L (ref 3.4–5.3)
RBC # BLD AUTO: 2.83 10E12/L (ref 4.4–5.9)
RSV RNA SPEC QL NAA+PROBE: NOT DETECTED
RSV RNA SPEC QL NAA+PROBE: NOT DETECTED
RV+EV RNA SPEC QL NAA+PROBE: NOT DETECTED
S PNEUM AG SPEC QL: NORMAL
SODIUM SERPL-SCNC: 131 MMOL/L (ref 133–144)
SODIUM SERPL-SCNC: 133 MMOL/L (ref 133–144)
SODIUM UR-SCNC: 13 MMOL/L
SPECIMEN SOURCE: ABNORMAL
SPECIMEN SOURCE: NORMAL
WBC # BLD AUTO: 5.6 10E9/L (ref 4–11)

## 2020-01-30 PROCEDURE — 25000128 H RX IP 250 OP 636: Performed by: INTERNAL MEDICINE

## 2020-01-30 PROCEDURE — 25000132 ZZH RX MED GY IP 250 OP 250 PS 637: Performed by: STUDENT IN AN ORGANIZED HEALTH CARE EDUCATION/TRAINING PROGRAM

## 2020-01-30 PROCEDURE — 84100 ASSAY OF PHOSPHORUS: CPT | Performed by: INTERNAL MEDICINE

## 2020-01-30 PROCEDURE — 25000128 H RX IP 250 OP 636: Performed by: PHYSICIAN ASSISTANT

## 2020-01-30 PROCEDURE — 85027 COMPLETE CBC AUTOMATED: CPT | Performed by: INTERNAL MEDICINE

## 2020-01-30 PROCEDURE — 25000132 ZZH RX MED GY IP 250 OP 250 PS 637: Performed by: INTERNAL MEDICINE

## 2020-01-30 PROCEDURE — 87633 RESP VIRUS 12-25 TARGETS: CPT | Performed by: STUDENT IN AN ORGANIZED HEALTH CARE EDUCATION/TRAINING PROGRAM

## 2020-01-30 PROCEDURE — 82570 ASSAY OF URINE CREATININE: CPT | Performed by: STUDENT IN AN ORGANIZED HEALTH CARE EDUCATION/TRAINING PROGRAM

## 2020-01-30 PROCEDURE — 83735 ASSAY OF MAGNESIUM: CPT | Performed by: INTERNAL MEDICINE

## 2020-01-30 PROCEDURE — 25000131 ZZH RX MED GY IP 250 OP 636 PS 637: Performed by: STUDENT IN AN ORGANIZED HEALTH CARE EDUCATION/TRAINING PROGRAM

## 2020-01-30 PROCEDURE — 87899 AGENT NOS ASSAY W/OPTIC: CPT | Performed by: STUDENT IN AN ORGANIZED HEALTH CARE EDUCATION/TRAINING PROGRAM

## 2020-01-30 PROCEDURE — 36415 COLL VENOUS BLD VENIPUNCTURE: CPT | Performed by: INTERNAL MEDICINE

## 2020-01-30 PROCEDURE — 99233 SBSQ HOSP IP/OBS HIGH 50: CPT | Performed by: INTERNAL MEDICINE

## 2020-01-30 PROCEDURE — 85049 AUTOMATED PLATELET COUNT: CPT | Performed by: INTERNAL MEDICINE

## 2020-01-30 PROCEDURE — 87641 MR-STAPH DNA AMP PROBE: CPT | Performed by: HOSPITALIST

## 2020-01-30 PROCEDURE — 83935 ASSAY OF URINE OSMOLALITY: CPT | Performed by: STUDENT IN AN ORGANIZED HEALTH CARE EDUCATION/TRAINING PROGRAM

## 2020-01-30 PROCEDURE — 87581 M.PNEUMON DNA AMP PROBE: CPT | Performed by: STUDENT IN AN ORGANIZED HEALTH CARE EDUCATION/TRAINING PROGRAM

## 2020-01-30 PROCEDURE — 80048 BASIC METABOLIC PNL TOTAL CA: CPT | Performed by: STUDENT IN AN ORGANIZED HEALTH CARE EDUCATION/TRAINING PROGRAM

## 2020-01-30 PROCEDURE — 36415 COLL VENOUS BLD VENIPUNCTURE: CPT | Performed by: STUDENT IN AN ORGANIZED HEALTH CARE EDUCATION/TRAINING PROGRAM

## 2020-01-30 PROCEDURE — 87640 STAPH A DNA AMP PROBE: CPT | Performed by: HOSPITALIST

## 2020-01-30 PROCEDURE — 00000146 ZZHCL STATISTIC GLUCOSE BY METER IP

## 2020-01-30 PROCEDURE — 25000128 H RX IP 250 OP 636: Performed by: STUDENT IN AN ORGANIZED HEALTH CARE EDUCATION/TRAINING PROGRAM

## 2020-01-30 PROCEDURE — 87486 CHLMYD PNEUM DNA AMP PROBE: CPT | Performed by: STUDENT IN AN ORGANIZED HEALTH CARE EDUCATION/TRAINING PROGRAM

## 2020-01-30 PROCEDURE — 80048 BASIC METABOLIC PNL TOTAL CA: CPT | Performed by: INTERNAL MEDICINE

## 2020-01-30 PROCEDURE — 12000001 ZZH R&B MED SURG/OB UMMC

## 2020-01-30 PROCEDURE — 25800030 ZZH RX IP 258 OP 636: Performed by: EMERGENCY MEDICINE

## 2020-01-30 PROCEDURE — 84300 ASSAY OF URINE SODIUM: CPT | Performed by: STUDENT IN AN ORGANIZED HEALTH CARE EDUCATION/TRAINING PROGRAM

## 2020-01-30 RX ORDER — HEPARIN SODIUM 5000 [USP'U]/.5ML
5000 INJECTION, SOLUTION INTRAVENOUS; SUBCUTANEOUS EVERY 12 HOURS
Status: DISCONTINUED | OUTPATIENT
Start: 2020-01-30 | End: 2020-01-30

## 2020-01-30 RX ORDER — FERROUS SULFATE 325(65) MG
325 TABLET ORAL 2 TIMES DAILY
Status: DISCONTINUED | OUTPATIENT
Start: 2020-01-30 | End: 2020-02-06 | Stop reason: HOSPADM

## 2020-01-30 RX ORDER — CEFTRIAXONE 1 G/1
1 INJECTION, POWDER, FOR SOLUTION INTRAMUSCULAR; INTRAVENOUS EVERY 24 HOURS
Status: DISCONTINUED | OUTPATIENT
Start: 2020-01-30 | End: 2020-01-30

## 2020-01-30 RX ORDER — LEVOFLOXACIN 5 MG/ML
250 INJECTION, SOLUTION INTRAVENOUS EVERY 24 HOURS
Status: DISCONTINUED | OUTPATIENT
Start: 2020-01-31 | End: 2020-01-31 | Stop reason: DRUGHIGH

## 2020-01-30 RX ORDER — LEVOFLOXACIN 5 MG/ML
500 INJECTION, SOLUTION INTRAVENOUS ONCE
Status: COMPLETED | OUTPATIENT
Start: 2020-01-30 | End: 2020-01-30

## 2020-01-30 RX ORDER — MULTIVIT WITH MINERALS/LUTEIN
250 TABLET ORAL 2 TIMES DAILY
Status: DISCONTINUED | OUTPATIENT
Start: 2020-01-30 | End: 2020-02-06 | Stop reason: HOSPADM

## 2020-01-30 RX ADMIN — CLOPIDOGREL BISULFATE 75 MG: 75 TABLET, FILM COATED ORAL at 19:48

## 2020-01-30 RX ADMIN — MELATONIN 1000 UNITS: at 19:48

## 2020-01-30 RX ADMIN — CEFTRIAXONE 1 G: 1 INJECTION, POWDER, FOR SOLUTION INTRAMUSCULAR; INTRAVENOUS at 10:58

## 2020-01-30 RX ADMIN — ACETAMINOPHEN 650 MG: 325 TABLET, FILM COATED ORAL at 22:25

## 2020-01-30 RX ADMIN — OXYCODONE HYDROCHLORIDE AND ACETAMINOPHEN 500 MG: 500 TABLET ORAL at 08:22

## 2020-01-30 RX ADMIN — SODIUM CHLORIDE 1000 ML: 900 INJECTION INTRAVENOUS at 19:33

## 2020-01-30 RX ADMIN — FERROUS SULFATE TAB 325 MG (65 MG ELEMENTAL FE) 325 MG: 325 (65 FE) TAB at 00:47

## 2020-01-30 RX ADMIN — LEVOFLOXACIN 500 MG: 5 INJECTION, SOLUTION INTRAVENOUS at 14:18

## 2020-01-30 RX ADMIN — MELATONIN 1000 UNITS: at 08:21

## 2020-01-30 RX ADMIN — SENNOSIDES AND DOCUSATE SODIUM 2 TABLET: 8.6; 5 TABLET ORAL at 00:01

## 2020-01-30 RX ADMIN — INSULIN ASPART 1 UNITS: 100 INJECTION, SOLUTION INTRAVENOUS; SUBCUTANEOUS at 22:25

## 2020-01-30 RX ADMIN — FERROUS SULFATE TAB 325 MG (65 MG ELEMENTAL FE) 325 MG: 325 (65 FE) TAB at 08:21

## 2020-01-30 RX ADMIN — SENNOSIDES AND DOCUSATE SODIUM 2 TABLET: 8.6; 5 TABLET ORAL at 19:49

## 2020-01-30 RX ADMIN — SODIUM CHLORIDE 1000 ML: 900 INJECTION INTRAVENOUS at 03:30

## 2020-01-30 RX ADMIN — ACETAMINOPHEN 650 MG: 325 TABLET, FILM COATED ORAL at 14:03

## 2020-01-30 RX ADMIN — SIMVASTATIN 40 MG: 20 TABLET, FILM COATED ORAL at 22:25

## 2020-01-30 RX ADMIN — CLOPIDOGREL BISULFATE 75 MG: 75 TABLET, FILM COATED ORAL at 00:02

## 2020-01-30 RX ADMIN — INSULIN ASPART 1 UNITS: 100 INJECTION, SOLUTION INTRAVENOUS; SUBCUTANEOUS at 00:07

## 2020-01-30 RX ADMIN — ASCORBIC ACID TAB 250 MG 250 MG: 250 TAB at 19:48

## 2020-01-30 RX ADMIN — MELATONIN 1000 UNITS: at 00:02

## 2020-01-30 RX ADMIN — SIMVASTATIN 40 MG: 20 TABLET, FILM COATED ORAL at 00:47

## 2020-01-30 RX ADMIN — INSULIN GLARGINE 3 UNITS: 100 INJECTION, SOLUTION SUBCUTANEOUS at 00:48

## 2020-01-30 RX ADMIN — ASPIRIN 81 MG CHEWABLE TABLET 81 MG: 81 TABLET CHEWABLE at 08:21

## 2020-01-30 RX ADMIN — OXYCODONE HYDROCHLORIDE AND ACETAMINOPHEN 500 MG: 500 TABLET ORAL at 00:47

## 2020-01-30 RX ADMIN — FERROUS SULFATE TAB 325 MG (65 MG ELEMENTAL FE) 325 MG: 325 (65 FE) TAB at 19:48

## 2020-01-30 ASSESSMENT — ACTIVITIES OF DAILY LIVING (ADL)
RETIRED_COMMUNICATION: 0-->UNDERSTANDS/COMMUNICATES WITHOUT DIFFICULTY
ADLS_ACUITY_SCORE: 13
ADLS_ACUITY_SCORE: 21
COGNITION: 0 - NO COGNITION ISSUES REPORTED
SWALLOWING: 0-->SWALLOWS FOODS/LIQUIDS WITHOUT DIFFICULTY
ADLS_ACUITY_SCORE: 19
ADLS_ACUITY_SCORE: 19
TRANSFERRING: 1-->ASSISTIVE EQUIPMENT
AMBULATION: 1-->ASSISTIVE EQUIPMENT
BATHING: 1-->ASSISTIVE EQUIPMENT
TOILETING: 1-->ASSISTIVE EQUIPMENT
ADLS_ACUITY_SCORE: 21
FALL_HISTORY_WITHIN_LAST_SIX_MONTHS: YES
DRESS: 0-->INDEPENDENT
NUMBER_OF_TIMES_PATIENT_HAS_FALLEN_WITHIN_LAST_SIX_MONTHS: 1
RETIRED_EATING: 0-->INDEPENDENT

## 2020-01-30 ASSESSMENT — MIFFLIN-ST. JEOR
SCORE: 1561.83
SCORE: 1484.19
SCORE: 1555.75
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
## 2020-01-30 NOTE — PROGRESS NOTES
"CLINICAL NUTRITION SERVICES - ASSESSMENT NOTE     Nutrition Prescription    RECOMMENDATIONS FOR MDs/PROVIDERS TO ORDER:  Recommend moderate consistent CHO diet   Replace Potassium to WNL     Malnutrition Status:    Non-severe malnutrition in the context of acute on chronic illness     Recommendations already ordered by Registered Dietitian (RD):  Sending Boost Glucose control at 10:00 and HS     Future/Additional Recommendations:  Monitor oral intake and need for additional supplementation.      REASON FOR ASSESSMENT  Amos Walker is a/an 74 year old adult assessed by the dietitian for Admission Nutrition Risk Screen for unintentional loss of 10# or more in the past two months    CLINICAL HISTORY   75 yo M w/ chronic tobacco use, T2DM c/b charcot joint, CKD, CAD (s/p PCI 3/18), and PAD presenting w/ productive cough, fever, and fatigue admitted for CAP.    NUTRITION HISTORY  Patient previously saw RD on 8/9/19 and discussed increasing calories and protein at that visit     Patient was sitting up in bed, alert, somewhat oriented but writer had difficult time obtaining nutrition history. Asked patient if he had lost weight in the past month and he reported yes, but unable to quantify. Writer asked if patient had gained weight since August (per weight history) and he stated he needed to eat more. Asked if patient was using supplements at home and he reported occasionally. Patient reported he did not feel hungry and when asked if he normally eats meals at home, reported only if blood sugar is low.       CURRENT NUTRITION ORDERS  Diet: Low Consistent Carbohydrate  Intake/Tolerance: 50% of one intake documented     LABS  K+ 3.2 (L)  Urea Nitrogen 67 (H)  Creatinine 1.89 (H)  Glucose 209-226 (past 3)  Phos 2.8 (WNL)  Mg 2.1 (WNL)    MEDICATIONS  Ferrous Sulfate  Novolog/Lantus   Senokot  Vitamin C  Vitamin D    ANTHROPOMETRICS  Height: 179.1 cm (5' 10.5\")  Most Recent Weight: 72 kg (158 lb 11.7 oz)    IBW: 75.5 kg  BMI: " Normal BMI  Weight History:   Wt Readings from Last 15 Encounters:   01/30/20 72 kg (158 lb 11.7 oz)   11/04/19 69.9 kg (154 lb)   10/31/19 70.8 kg (156 lb)   10/21/19 69.9 kg (154 lb)   10/04/19 69.4 kg (153 lb)   06/24/19 69.4 kg (153 lb)   06/10/19 72.1 kg (159 lb)   05/09/19 73.6 kg (162 lb 4.8 oz)   05/08/19 73 kg (161 lb)   04/29/19 73.1 kg (161 lb 3.2 oz)   04/01/19 74.8 kg (165 lb)   02/01/19 77.1 kg (170 lb)   11/05/18 75.3 kg (166 lb)   09/18/18 75.8 kg (167 lb)   09/15/18 76.6 kg (168 lb 12.8 oz)   5% weight loss from May 2019-June 2019 with slight weight gain since October 2019.     Dosing Weight: 72 kg (actual)    ASSESSED NUTRITION NEEDS  Estimated Energy Needs: 2304-1352+ kcals/day (25 - 30 kcals/kg)  Justification: Maintenance  Estimated Protein Needs:  grams protein/day (1.2 - 1.5 grams of pro/kg)  Justification: Maintenance with depleted LBM   Estimated Fluid Needs: 1800-2160mL/day (1 mL/kcal)   Justification: Maintenance or per provider     PHYSICAL FINDINGS  Patient's skin very warm, 99.9 (afebrile)  Hard of hearing, hearing aids     MALNUTRITION  % Intake: Unable to assess  % Weight Loss: None noted  Subcutaneous Fat Loss: Facial region:  mild, Upper arm: mild and Lower arm:  mild  Muscle Loss: Temporal:  moderate, Thoracic region (clavicle, acromium bone, deltoid, trapezius, pectoral):  moderate, Upper arm (bicep, tricep):  moderate, Lower arm  (forearm):  Moderate , Dorsal hand:  severe  Fluid Accumulation/Edema: None noted  Malnutrition Diagnosis: Non-severe malnutrition in the context of acute on chronic illness     NUTRITION DIAGNOSIS  Predicted inadequate nutrient intake (energy & protein) related to unknown etiology as evidenced by reduced lean body mass, under IBW and previous weight loss and slow weight gain.     INTERVENTIONS  Implementation  Nutrition Education: RD role in care    Collaboration with other providers  Medical food supplement therapy     Goals  Patient to consume  % of nutritionally adequate meal trays TID, or the equivalent with supplements/snacks.     Monitoring/Evaluation  Progress toward goals will be monitored and evaluated per protocol.    Deja Yanez RD, ERENDIRA  6B pager: [phone]

## 2020-01-30 NOTE — PHARMACY-ADMISSION MEDICATION HISTORY
Admission medication history for the January 29, 2020 admission is complete.     Interview sources:  Patient, Partner, Dispense Hx, Office Visit Summary 12/20/19, [facility] pharmacy ([phone])    Medication adherence:  Poor - patient reports missing a dose of medication 4-5 times per week.   inpatient:    Current outpatient pharmacy:  [id number] - [place], [place]    Pertinent Medication Changes:  Added: None  Deleted: The patient reports he is no longer taking the following medications:    Gentamicin 0.1% external cream    Ketorolac tromethamine 0.4% ophthalmic solution    Nystatin-triamcinolone 097255-6.1 unit/gm % external ointment    Prednisolone 1% ophthalmic suspension    Silver sulfadiazine 1 % external cream    Triamcinolone 0.1% external cream    Changed: None    Additional medication history information:   This medication history was completed at the patients bedside in the Adult Emergency Department.  Patient was a poor historian of his medications and was unable to state medication name, strength, dosing instructions or last dose for most medications. Patient recognized medications by name when spoken by pharmacy and states he is no longer using any eye drops or creams at home.   Contacted [facility] pharmacy via phone to verify patient's prescription medications.  Additionally, chart review was used to verify use of iron and ascorbic acid supplements.     Cefdinr:  Patient was provided a 28 days supply of this medication and started cefdinr on 1/25/2020.  Patient's last dose was this morning (1/29/2020).    Lantus:  Per [facility], the patient is directed to inject 6 units of Lantus daily. Patient is unsure when he last administered this medication and how much insulin was given.    Humalog:  Per [facility], the patient is directed to inject 4 units with breakfast, and 3 units with lunch and dinner.  Patient reports he received 4 units of Humalog this morning  "(2020).     The patient denies currently taking any additional prescription, OTC or herbal medications at home.   Prior to Admission Medications   Prescriptions Last Dose Informant Taking?   Blood Pressure KIT   No   Si Device daily   Continuous Blood Gluc  (FREESTYLE LATOYA 14 DAY READER) TANIA   No   Si Device every hour as needed (every hour prn)   Gauze Pads & Dressings (OPTIFOAM) 6\"X6\" PADS   No   Si Box once a week   ONETOUCH ULTRA test strip   No   Sig: TEST TWICE DAILY AS DIRECTED   VITAMIN D, CHOLECALCIFEROL, PO Past Week at Unknown time Self Yes   Sig: Take 1,000 Units by mouth 2 times daily   ammonium lactate (LAC-HYDRIN) 12 % cream Past Month at PRN Pharmacy Yes   Sig: Apply topically 2 times daily as needed for dry skin   ascorbic acid 500 MG TABS Past Week Self Yes   Sig: Take 1 tablet (500 mg) by mouth 2 times daily   aspirin 81 MG EC tablet Past Week Self Yes   Sig: Take 81 mg by mouth daily   blood glucose monitoring (FREESTYLE) lancets   No   Sig: Use to test blood sugars 4 times daily as directed.   cefdinir (OMNICEF) 300 MG capsule 2020 at AM Pharmacy Yes   Sig: Take 1 capsule (300 mg) by mouth 2 times daily   clopidogrel (PLAVIX) 75 MG tablet 2020 at PM Pharmacy Yes   Sig: Take 1 tablet (75 mg) by mouth every evening   continuous blood glucose monitoring (FREESTYLE LATOYA) sensor   No   Sig: For use with Freestyle Latoya Flash  for continuous monitioring of blood glucose levels. Replace sensor every 14 days.   dulaglutide (TRULICITY) 1.5 MG/0.5ML pen Past Week Pharmacy Yes   Sig: Inject 1.5 mg Subcutaneous every 7 days Do not mix with insulin. Separate from insulin injection site by 2 inches.   ferrous sulfate (IRON) 325 (65 FE) MG tablet Past Week Self Yes   Sig: Take 1 tablet (325 mg) by mouth 2 times daily   insulin glargine (LANTUS PEN) 100 UNIT/ML pen 2020 Pharmacy Yes   Sig: Inject 6 Units Subcutaneous At Bedtime   insulin lispro (HUMALOG PEN) " 100 UNIT/ML pen 2020 at AM Pharmacy Yes   Si UNITS WITH BREAKFAST, 3 UNITS WITH LUNCH AND DINNER   insulin pen needle (B-D U/F) 31G X 8 MM miscellaneous   No   Sig: USE FOUR DAILY AS DIRECTED   ketoconazole (NIZORAL) 2 % external shampoo More than a month Self No   Sig: Apply topically twice a week Leave on for 3-5 min then rinse off; after 2-4 weeks use only once weekly   lisinopril (PRINIVIL/ZESTRIL) 2.5 MG tablet  Pharmacy No   Sig: Take 1 tablet (2.5 mg) by mouth daily   order for DME   No   Sig: Please measure and distribute 1 pair of 30mmHg - 40mmHg knee high open toe ulcercare compression stockings. Jobst ultrasheer or equivalent.   order for DME   No   Sig: Equipment being ordered: Roll a bout knee scooter   polyethylene glycol (MIRALAX/GLYCOLAX) powder Past Week Self Yes   Sig: Take 17 g (1 capful) by mouth daily   simvastatin (ZOCOR) 40 MG tablet 2020 Self Yes   Sig: Take 1 tablet (40 mg) by mouth At Bedtime        Time spent: 50 minutes    Medication history completed by:  Meliza Brand, Pharmacy Intern

## 2020-01-30 NOTE — H&P
[facility], [place]    History and Physical - [facility] Service        Date of Admission:  1/29/2020    Assessment & Plan   Amos Walker is a 75 yo M w/ chronic tobacco use, T2DM c/b charcot joint, CKD, CAD (s/p PCI 3/18), and PAD presenting w/ productive cough, fever, and fatigue admitted for CAP.    # Sepsis  # CAP  # Productive cough, fevers  Most likely CAP but consider aspiration pneumonia. Less likely PE d/o insidious onset of symptoms. Admitted w/ 5 days of progressive fatigue, productive cough, decreased appetite, intermittent fevers w/ Tmax of 103.9. Cough productive of yellow and pink-tinged sputum. Sick contacts include wife and children sick with cold-like symptoms at home. Febrile to 101.6 and tachycardia on admission. Labs remarkable for hyponatremia (128); no leukocytosis or elevated lactic acid. CXR significant for large RLL focal airspace opacity.   - Ceftrixone 1g IV q24h  - Azithromycin 500g IV q24  - Respiratory viral panel  - Urine legionella and strep ag  - In setting of chronic tobacco use, will obtain CT chest w/o contrast   - If does not improve w/ CAP management, consider aspiration pna and may want to switch unasyn    # RYAN on CKD  # Hypovolemic Hyponatremia  Likely secondary to decreased po intake vs SIADH in setting of pneumonia. CKD 2/2 to T2DM and HTN. Admission cr 1.90 (bl 1.2), BUN 68. S/p 2L IVF in ED.   - Repeat labs   - Consider further IVF but caution in rapid correction  - BMP in AM  - FeNa, urine osmolality  - Bedside bladder US to assess for retention  - Avoid nephrotoxic agents  - Hold pta lisinopril    # PAD  # Chronic RLE wound  Per wife, chronic wound has been present for past 5 years. Follows w/ podiatry. Recent debridement for subtle non-healing area. Started on cefdinir 300mg BID on 1/25.   - Wound nurse consult  - Hold cefdinir in setting of IV abx as above    # T2DM  Complicated by charcot joint, CKD, and chronic RLE wound.  Admission glucose 300s. Last HgbA1c 5.8 (12/20).  - Low-dose sliding scale insulin  - Lantus pta decreased from 6u to 3u  - Hypoglycemia protocol    Chronic Issues  # CAD: cont pta clopidogrel, aspirin  # HLD: cont pta simvastatin     Diet: carb consistent diet  Fluids: 125cc/hr ns  DVT Prophylaxis: Heparin SQ  Daniel Catheter: not present  Code Status: FULL    Disposition Plan   Expected discharge: 2 - 3 days, recommended to prior living arrangement pending workup of cough and hyponatremia.   Entered: Brigida Gan MD 01/29/2020, 8:50 PM       The patient's care was discussed with the Attending Physician, Dr. Reardon.    Brigida Gan MD  [facility], [place]  Pager: [phone]  Please see sticky note for cross cover information  ______________________________________________________________________    Chief Complaint   Fever  Cough  Fatigue, weakness    History of Present Illness   Amos Walker is a 73 yo M w/ chronic tobacco use, T2DM c/b charcot joint, CKD, CAD (s/p PCI 3/18), and PAD presenting w/ productive cough, fever, and fatigue.     Amos reports a progressive cough productive of yellow and pink-tinged sputum for past week. He also endorses fatigue, generalized weakness, and decrease in appetite. Per wife, temperature at home has been intermittently elevated w/ Tmax of 103F three days pta and 102.2F on morning of admission. Sick contacts at home include wife and 2 children who were experiencing cold-like symptoms. He took acetaminophen for his fevers. He denies chest pain, vomiting, dysuria but does have mild nausea.     Amos follows with podiatry as outpatient for chronic RLE wound. Per wife, he recently underwent debridement for subtle non-healing area and was subsequently started on cefdinir on 1/25/20. Per wife, patient has been sleeping most of day and at one point, she was concerned for some development of confusion in patient. She reports he has been eating  "well up until about a week ago and she has noticed a decrease in his appetite as well as \"slow to chew and swallow.\"     ED course: Febrile to 101.6, tachycardia. Labs remarkable for RYAN, hyperglycemia, and hyponatremia; no leukocytosis or elevated lactate. CXR revealed large RLL focal airspace opacity.     Review of Systems    The 10 point Review of Systems is negative other than noted in the HPI.    Past Medical History    I have reviewed this patient's medical history and updated it with pertinent information if needed.   Past Medical History:   Diagnosis Date     Anemia      CAD (coronary artery disease)     2V CAD involving LAD and RCA, s/p DESx4 in 3/18     CKD (chronic kidney disease) stage 3, GFR 30-59 ml/min (H)      Colon polyp      Emphysema of lung (H)     noted on CT     Heart disease      HTN (hypertension)      Hyperlipidemia      MRSA cellulitis of right foot     in past.      PAD (peripheral artery disease) (H) 2018    s/p R femoral enarterectomy and stenting      Tobacco use     50+ pack     Type 2 diabetes mellitus (H)     for 25 yrs.  on insulin and starlix     Venous ulcer (H)        Past Surgical History   I have reviewed this patient's surgical history and updated it with pertinent information if needed.    Social History   Tobacco: current 0.5 ppd; previous 1.5 ppd for 40 years  Alcohol: denies  Recreational drug: denies    Prior to Admission Medications   Prior to Admission Medications   Prescriptions Last Dose Informant Patient Reported? Taking?   Blood Pressure KIT   No No   Si Device daily   Continuous Blood Gluc  (FREESTYLE LATOYA 14 DAY READER) TANIA   No No   Si Device every hour as needed (every hour prn)   Gauze Pads & Dressings (OPTIFOAM) 6\"X6\" PADS   No No   Si Box once a week   ONETOUCH ULTRA test strip   No No   Sig: TEST TWICE DAILY AS DIRECTED   VITAMIN D, CHOLECALCIFEROL, PO Past Week at Unknown time Self Yes Yes   Sig: Take 1,000 Units by mouth 2 times " daily   ammonium lactate (LAC-HYDRIN) 12 % cream Past Month at PRN Pharmacy No Yes   Sig: Apply topically 2 times daily as needed for dry skin   ascorbic acid 500 MG TABS Past Week Self No Yes   Sig: Take 1 tablet (500 mg) by mouth 2 times daily   aspirin 81 MG EC tablet Past Week Self Yes Yes   Sig: Take 81 mg by mouth daily   blood glucose monitoring (FREESTYLE) lancets   No No   Sig: Use to test blood sugars 4 times daily as directed.   cefdinir (OMNICEF) 300 MG capsule 2020 at AM Pharmacy No Yes   Sig: Take 1 capsule (300 mg) by mouth 2 times daily   clopidogrel (PLAVIX) 75 MG tablet 2020 at PM Pharmacy No Yes   Sig: Take 1 tablet (75 mg) by mouth every evening   continuous blood glucose monitoring (FREESTYLE LATOYA) sensor   No No   Sig: For use with Freestyle Latoya Flash  for continuous monitioring of blood glucose levels. Replace sensor every 14 days.   dulaglutide (TRULICITY) 1.5 MG/0.5ML pen Past Week Pharmacy No Yes   Sig: Inject 1.5 mg Subcutaneous every 7 days Do not mix with insulin. Separate from insulin injection site by 2 inches.   ferrous sulfate (IRON) 325 (65 FE) MG tablet Past Week Self No Yes   Sig: Take 1 tablet (325 mg) by mouth 2 times daily   insulin glargine (LANTUS PEN) 100 UNIT/ML pen 2020 Pharmacy No Yes   Sig: Inject 6 Units Subcutaneous At Bedtime   insulin lispro (HUMALOG PEN) 100 UNIT/ML pen 2020 at AM Pharmacy No Yes   Si UNITS WITH BREAKFAST, 3 UNITS WITH LUNCH AND DINNER   insulin pen needle (B-D U/F) 31G X 8 MM miscellaneous   No No   Sig: USE FOUR DAILY AS DIRECTED   ketoconazole (NIZORAL) 2 % external shampoo More than a month Self No No   Sig: Apply topically twice a week Leave on for 3-5 min then rinse off; after 2-4 weeks use only once weekly   lisinopril (PRINIVIL/ZESTRIL) 2.5 MG tablet  Pharmacy No No   Sig: Take 1 tablet (2.5 mg) by mouth daily   order for DME   No No   Sig: Please measure and distribute 1 pair of 30mmHg - 40mmHg knee high  open toe ulcercare compression stockings. Jobst ultrasheer or equivalent.   order for DME   No No   Sig: Equipment being ordered: Roll a bout knee scooter   polyethylene glycol (MIRALAX/GLYCOLAX) powder Past Week Self No Yes   Sig: Take 17 g (1 capful) by mouth daily   simvastatin (ZOCOR) 40 MG tablet 1/28/2020 Self No Yes   Sig: Take 1 tablet (40 mg) by mouth At Bedtime      Facility-Administered Medications: None     Allergies   Allergies   Allergen Reactions     Lisinopril Other (See Comments)     dizziness     Neomycin Other (See Comments)     Wound gets worse     Methylchloroisothiazolinone [Methylisothiazolinone] Rash     Povidone Iodine Rash       Physical Exam   Vital Signs: Temp: 101.6  F (38.7  C) Temp src: Oral BP: 114/59   Heart Rate: 87 Resp: 27 SpO2: 96 % O2 Device: Nasal cannula Oxygen Delivery: 2 LPM  Weight: 0 lbs 0 oz    Gen: cachexia, NAD  HEENT: [place]; no lymphadenopathy  CV: RRR, no murmurs; pulses present in bilateral LE  Lungs: CTAB, no wheezing  Abd: NT, ND, BS present  Ext: RLE healing wound around ankle; charcot joint of RLE; chronic healed wounds of LLE    Data   Data reviewed today: I reviewed all medications, new labs and imaging results over the last 24 hours.     Recent Results (from the past 24 hour(s))   XR Chest 2 Views    Narrative    EXAMINATION: XR CHEST 2 VW, 1/29/2020 6:01 PM    INDICATION: cough and fever    COMPARISON: Chest radiograph 10/30/2019    FINDINGS: AP and lateral upright views of the chest.     Trachea is midline. Cardiomediastinal borders are clear. Cardiac  silhouette is not enlarged. Right lower lobe large focal airspace  opacity. Left lower lung is unremarkable. No appreciable pleural  effusion. No pneumothorax. No acute osseous abnormalities. Soft  tissues are grossly unremarkable.        Impression    IMPRESSION:  Large right lower lobe focal airspace opacity likely pneumonia.    I have personally reviewed the examination and initial interpretation  and I agree  with the findings.    DAVONTE BEDOYA MD   CT Chest w/o Contrast    Narrative    EXAM: CT CHEST W/O CONTRAST  LOCATION: [facility]  DATE/TIME: 1/29/2020 10:32 PM    INDICATION: Productive cough with blood-tinged sputum. Evaluate for malignancy or etiology for cough.  COMPARISON: 05/11/2018.  TECHNIQUE: CT chest without IV contrast. Multiplanar reformats were obtained. Dose reduction techniques were used.  CONTRAST: None.    FINDINGS:   LUNGS AND PLEURA: Dense consolidative alveolar infiltrate involving the right lower lobe with air bronchogram formation. Findings compatible with pneumonia. Minimal hazy ill-defined infiltrate posterior aspect of the right upper lobe and right middle   lobe. No evidence for acute focal infiltrate or consolidation left lung. Minimal right basilar pleural fluid. Moderate emphysema. Bronchial wall thickening in the right lung related to the inflammatory change.    MEDIASTINUM/AXILLAE: Allowing for the noncontrast study there is no evidence for overt lymphadenopathy. Normal heart size. No pericardial fluid. Advanced atherosclerotic vascular calcification. Normal caliber esophagus. No axillary lymphadenopathy.    UPPER ABDOMEN: Partially visualized 1 cm calculus within the gallbladder lumen. Adrenal glands negative.    MUSCULOSKELETAL: Minimal degenerative changes in the spine.      Impression    IMPRESSION:   1.  Dense consolidative alveolar infiltrate right lower lobe with hazy ill-defined infiltrate posterior aspect right upper lobe and right middle lobe. Findings compatible with pneumonia. Recommend continued CT follow-up to assess for complete resolution   given the dense consolidation as a right infrahilar mass or lesion within the right lower lung cannot be excluded.    2.  Moderate emphysema.    3.  Minimal right basilar pleural fluid.    4.  Advanced atherosclerotic vascular calcification including coronary artery calcification.    5.  Partially visualized 1 cm  calculus in the gallbladder lumen.

## 2020-01-30 NOTE — PLAN OF CARE
Admission          1/29/2020  5:06 PM  -----------------------------------------------------------  Reason for admission:  Primary team notified of pt arrival.  Admitted from:ED  Via: stretcher  Accompanied by: ED staff  Belongings: Placed in closet  Admission Profile: complete  Teaching: orientation to unit and call light- call light within reach, call don't fall, use of console, meal times, when to call for the RN, and enforced importance of safety   Access: PIV  Telemetry:Placed on pt  Ht./Wt.: complete  2 RN Skin Assessment Completed By:Lexi ramos 6B RN's  Pt status:Stable    Temp:  [98.7  F (37.1  C)-101.6  F (38.7  C)] 98.7  F (37.1  C)  Pulse:  [70-79] 79  Heart Rate:  [] 70  Resp:  [16-27] 18  BP: ()/(51-72) 115/63  SpO2:  [90 %-99 %] 90 %     Neuro: A&Ox4.   Cardiac: SR in 60-80's. VSS.   Respiratory: Sating 94%> on RA.  GI/: Adequate urine output. No BM over night  Diet/appetite: Tolerating CHO diet.  Blood glucose checked every ACHS.  Activity:  Assist of 2 to turn and reposition  Pain: No c/o pain this shift..   Skin: No new deficits noted.      Plan: Continue with POC. Notify primary team with changes.

## 2020-01-30 NOTE — PLAN OF CARE
[id number]:  NEURO: A&Ox4, very [place]. Wearing hearing aides.  RESPIRATORY: O2 sats low 90s on RA.  CARDIO: VSS, HR 90s, tmax 99.9 orally. Tylenol x1.  GI/: Using urinal frequently, good output. BM x1 this morning.  SKIN: Bilateral foot wounds; followed by Podiatry outpatient. Podiatry consult ordered.  ACTIVITY: Up to BR with assist x1 using walker and GB.  PAIN: Denies pain  LINES: L PIV with NS infusing at 125mL/hr.  PLAN: Transferred to Unit 7B at 1445. Continue plan of care. Notify MD with changes/concerns.

## 2020-01-30 NOTE — PROGRESS NOTES
"WOC Service Consult Note      S: WOC service consulted for chronic RLE wounds  B: patient follow with podiatry for these wounds, was just seen by Dr Mcclain on 1/24 with plan to follow up with patient on 1/31.  A: did not assess patient as more appropriate for podiatry to continue to follow pt while hospitalized. Notified RN and eran MUNSON about recommendation.     R: per Dr. Mcclain's notes plan of care for wounds:   \"Diagnosis and treatment options discussed with him.  He will continue the [place] brace.  I am going to have him continue the Wound Veil and Hydrofera Blue on right anterior leg lesion.  Right lateral foot lesion was sharp debrided through the subcutaneous tissues with a tissue cutter.  No anesthesia needed and local wound care done upon consent today.  The lesion bled well upon debridement.  Steri-Strip applied.  I am going to have him put Hydrofera Blue over this, leave the Steri-Strip intact.  Between the right first interspace apply Aquacel Ag, clean all this with MicroKlenz every 2-3 days.  Prescription for Cefdinir given and use discussed with him.  He will return to clinic and see me in 1 week\"    WO service will sign off at this time, please re-consult for further concerns.     Marisa Gallo RN, CWOCN    "

## 2020-01-30 NOTE — ED NOTES
"[facility], [place]   ED Nurse to Floor Handoff     Amos Walker is a 74 year old adult who speaks English and lives with a spouse,  in a home  They arrived in the ED by ambulance from home    ED Chief Complaint: Generalized Weakness; Cough; and Wound Check    ED Dx;   Final diagnoses:   Dehydration   Hyponatremia   Fever, unspecified fever cause   Pneumonia of right lower lobe due to infectious organism (H)         Needed?: No    Allergies:   Allergies   Allergen Reactions     Lisinopril Other (See Comments)     dizziness     Neomycin Other (See Comments)     Wound gets worse     Methylchloroisothiazolinone [Methylisothiazolinone] Rash     Povidone Iodine Rash   .  Past Medical Hx:   Past Medical History:   Diagnosis Date     Anemia      CAD (coronary artery disease)     2V CAD involving LAD and RCA, s/p DESx4 in 3/18     CKD (chronic kidney disease) stage 3, GFR 30-59 ml/min (H)      Colon polyp      Emphysema of lung (H)     noted on CT     Heart disease      HTN (hypertension)      Hyperlipidemia      MRSA cellulitis of right foot     in past.      PAD (peripheral artery disease) (H) 09/2018    s/p R femoral enarterectomy and stenting      Tobacco use     50+ pack     Type 2 diabetes mellitus (H)     for 25 yrs.  on insulin and starlix     Venous ulcer (H)       Baseline Mental status: WDL  Current Mental Status changes: at basesline, very [place]    Infection present or suspected this encounter: cultures pending (chest XR positive for pneumonia, flu pending)  Sepsis suspected: No  Isolation type: Contact, Droplet     Activity level - Baseline/Home:  Independent w/ cane, pt states \"hasn't been going well\"  Activity Level - Current:   Stand with assist x2    Bariatric equipment needed?: No    In the ED these meds were given:   Medications   sodium chloride (PF) 0.9% PF flush 3 mL (has no administration in time range)   sodium chloride (PF) 0.9% PF flush 3 mL ( Intravenous " Canceled Entry 1/29/20 1838)   0.9% sodium chloride BOLUS (has no administration in time range)   cefTRIAXone (ROCEPHIN) 2 g vial to attach to  ml bag for ADULTS or NS 50 ml bag for PEDS (2 g Intravenous New Bag 1/29/20 1838)   azithromycin (ZITHROMAX) 500 mg in sodium chloride 0.9 % 250 mL intermittent infusion (has no administration in time range)   0.9% sodium chloride BOLUS (1,000 mLs Intravenous New Bag 1/29/20 1817)     Followed by   sodium chloride 0.9% infusion (has no administration in time range)   acetaminophen (TYLENOL) tablet 975 mg (975 mg Oral Given 1/29/20 1823)       Drips running?  Yes    Home pump  No    Current LDAs  Peripheral IV 01/29/20 Left Upper forearm (Active)   Site Assessment WDL 1/29/2020  5:08 PM   Line Status Saline locked 1/29/2020  5:08 PM   Phlebitis Scale 0-->no symptoms 1/29/2020  5:08 PM   Number of days: 0       Wound 08/25/17 Right Leg Ulceration (Active)   Number of days: 887       Wound Right Foot (Active)   Number of days:        Incision/Surgical Site 08/27/17 Left Hip (Active)   Number of days: 885       Incision/Surgical Site 09/14/18 Right Groin (Active)   Number of days: 502       Incision/Surgical Site 11/04/19 Right Eye (Active)   Number of days: 86       Labs results:   Labs Ordered and Resulted from Time of ED Arrival Up to the Time of Departure from the ED   COMPREHENSIVE METABOLIC PANEL - Abnormal; Notable for the following components:       Result Value    Sodium 128 (*)     Glucose 388 (*)     Urea Nitrogen 68 (*)     Creatinine 1.90 (*)     GFR Estimate 34 (*)     GFR Estimate If Black 40 (*)     Calcium 7.8 (*)     Albumin 2.0 (*)     Protein Total 6.0 (*)     Alkaline Phosphatase 173 (*)     AST 68 (*)     All other components within normal limits   CBC WITH PLATELETS DIFFERENTIAL - Abnormal; Notable for the following components:    RBC Count 2.88 (*)     Hemoglobin 9.2 (*)     Hematocrit 27.3 (*)     Platelet Count 117 (*)     Absolute Lymphocytes 0.0  (*)     All other components within normal limits   LACTIC ACID WHOLE BLOOD   PERIPHERAL IV CATHETER   PULSE OXIMETRY NURSING   CARDIAC CONTINUOUS MONITORING   NURSING DRAW AND HOLD   BLOOD CULTURE   BLOOD CULTURE   INFLUENZA A/B ANTIGEN       Imaging Studies:   Recent Results (from the past 24 hour(s))   XR Chest 2 Views    Impression    IMPRESSION:  Large right lower lobe focal airspace opacity likely infectious.       Recent vital signs:   /59   Temp 101.6  F (38.7  C) (Oral)   Resp 27   SpO2 96%     [place] Coma Scale Score: 15 (01/29/20 1724)       Cardiac Rhythm: Normal Sinus  Pt needs tele? Yes  Skin/wound Issues: R ankle/side of foot/between big toe and second toe (pt states is an old healing MRSA infection)    Code Status: Full Code    Pain control: pt had none    Nausea control: pt had none    Abnormal labs/tests/findings requiring intervention: see results    Family present during ED course? Yes   Family Comments/Social Situation comments: wife at bedside    Tasks needing completion: None    Roge Salazar, RN  [phone] [facility]

## 2020-01-30 NOTE — PROGRESS NOTES
[facility]    Medicine Progress Note - Hospitalist Service, Gold 11       Date of Admission:  1/29/2020  Assessment & Plan   This patient is being treated for outpatient pneumonia and has been diagnosed with legionella. He is on a quinolone and being re-assessed. He has not been well for approximately two weeks and was so sick last  Week that he was not able to smoke. His history is somewhat unreliable in that he is encephalopathic.      Diet: Consistent Carbohydrate Diet 7410-0195 Calories: Low Consistent CHO (3-5 CHO units/meal)  Snacks/Supplements Adult: Boost Glucose Control; Between Meals    DVT Prophylaxis: Enoxaparin (Lovenox) SQ  Daniel Catheter: not present  Code Status: Full Code      Disposition Plan   Expected discharge: 4 - 7 days, recommended to prior living arrangement once resolution of his penumonia or improvement enough for him to go home.  Entered: Johny Mccartney MD 01/30/2020, 5:26 PM       The patient's care was discussed with the Patient's Family.    Johny Mccartney MD  Hospitalist Service, [address]  Pager: [phone]  Please see sticky note for cross cover information  ______________________________________________________________________    Interval History   As a historian he is not completely reliable but he has been sick for about two weeks and was not able to smoke last week. He is an active smoker.    Data reviewed today: I reviewed all medications, new labs and imaging results over the last 24 hours. I personally reviewed the chest x-ray image(s) showing the right lower lobe penumonia.    Physical Exam   Vital Signs: Temp: 99.6  F (37.6  C) Temp src: Oral BP: 137/58 Pulse: 79 Heart Rate: 101 Resp: 18 SpO2: 92 % O2 Device: None (Room air)    Weight: 162 lbs 4.14 oz  General Appearance: An older than stated age white male  Respiratory: decreased breath sounds on the  right  Cardiovascular: RR  GI: BS soft and non- tender  Skin: intact  Other: Rectum and perineum no skin lesions    Data   Recent Labs   Lab 01/30/20  0524 01/30/20  0104 01/29/20  1706   WBC 5.6  --  5.5   HGB 8.9*  --  9.2*   MCV 95  --  95   *  --  117*    131* 128*   POTASSIUM 3.2* 3.4 3.7   CHLORIDE 104 103 96   CO2 22 20 25   BUN 67* 68* 68*   CR 1.89* 1.97* 1.90*   ANIONGAP 7 8 6   CLAUDIA 7.8* 7.8* 7.8*   * 262* 388*   ALBUMIN  --   --  2.0*   PROTTOTAL  --   --  6.0*   BILITOTAL  --   --  1.0   ALKPHOS  --   --  173*   ALT  --   --  26   AST  --   --  68*

## 2020-01-31 ENCOUNTER — APPOINTMENT (OUTPATIENT)
Dept: MRI IMAGING | Facility: CLINIC | Age: 75
DRG: 871 | End: 2020-01-31
Attending: INTERNAL MEDICINE
Payer: COMMERCIAL

## 2020-01-31 ENCOUNTER — APPOINTMENT (OUTPATIENT)
Dept: PHYSICAL THERAPY | Facility: CLINIC | Age: 75
DRG: 871 | End: 2020-01-31
Attending: PHYSICIAN ASSISTANT
Payer: COMMERCIAL

## 2020-01-31 ENCOUNTER — APPOINTMENT (OUTPATIENT)
Dept: OCCUPATIONAL THERAPY | Facility: CLINIC | Age: 75
DRG: 871 | End: 2020-01-31
Attending: INTERNAL MEDICINE
Payer: COMMERCIAL

## 2020-01-31 LAB
ALBUMIN UR-MCNC: 30 MG/DL
ANION GAP SERPL CALCULATED.3IONS-SCNC: 5 MMOL/L (ref 3–14)
ANION GAP SERPL CALCULATED.3IONS-SCNC: 6 MMOL/L (ref 3–14)
APPEARANCE UR: CLEAR
BILIRUB UR QL STRIP: NEGATIVE
BUN SERPL-MCNC: 33 MG/DL (ref 7–30)
BUN SERPL-MCNC: 37 MG/DL (ref 7–30)
CALCIUM SERPL-MCNC: 7.9 MG/DL (ref 8.5–10.1)
CALCIUM SERPL-MCNC: 8.3 MG/DL (ref 8.5–10.1)
CHLORIDE SERPL-SCNC: 106 MMOL/L (ref 94–109)
CHLORIDE SERPL-SCNC: 107 MMOL/L (ref 94–109)
CO2 SERPL-SCNC: 22 MMOL/L (ref 20–32)
CO2 SERPL-SCNC: 23 MMOL/L (ref 20–32)
COLOR UR AUTO: YELLOW
CREAT SERPL-MCNC: 1.06 MG/DL (ref 0.66–1.25)
CREAT SERPL-MCNC: 1.13 MG/DL (ref 0.66–1.25)
ERYTHROCYTE [DISTWIDTH] IN BLOOD BY AUTOMATED COUNT: 13.2 % (ref 10–15)
GFR SERPL CREATININE-BSD FRML MDRD: 64 ML/MIN/{1.73_M2}
GFR SERPL CREATININE-BSD FRML MDRD: 69 ML/MIN/{1.73_M2}
GLUCOSE BLDC GLUCOMTR-MCNC: 214 MG/DL (ref 70–99)
GLUCOSE BLDC GLUCOMTR-MCNC: 229 MG/DL (ref 70–99)
GLUCOSE BLDC GLUCOMTR-MCNC: 235 MG/DL (ref 70–99)
GLUCOSE BLDC GLUCOMTR-MCNC: 282 MG/DL (ref 70–99)
GLUCOSE BLDC GLUCOMTR-MCNC: 301 MG/DL (ref 70–99)
GLUCOSE SERPL-MCNC: 226 MG/DL (ref 70–99)
GLUCOSE SERPL-MCNC: 239 MG/DL (ref 70–99)
GLUCOSE UR STRIP-MCNC: 70 MG/DL
HCT VFR BLD AUTO: 28.4 % (ref 40–53)
HGB BLD-MCNC: 9.4 G/DL (ref 13.3–17.7)
HGB UR QL STRIP: ABNORMAL
KETONES UR STRIP-MCNC: NEGATIVE MG/DL
LACTATE BLD-SCNC: 1.1 MMOL/L (ref 0.7–2)
LEUKOCYTE ESTERASE UR QL STRIP: NEGATIVE
MCH RBC QN AUTO: 31.4 PG (ref 26.5–33)
MCHC RBC AUTO-ENTMCNC: 33.1 G/DL (ref 31.5–36.5)
MCV RBC AUTO: 95 FL (ref 78–100)
MRSA DNA SPEC QL NAA+PROBE: NEGATIVE
MUCOUS THREADS #/AREA URNS LPF: PRESENT /LPF
NITRATE UR QL: NEGATIVE
PH UR STRIP: 5.5 PH (ref 5–7)
PLATELET # BLD AUTO: 135 10E9/L (ref 150–450)
POTASSIUM SERPL-SCNC: 3 MMOL/L (ref 3.4–5.3)
POTASSIUM SERPL-SCNC: 3.5 MMOL/L (ref 3.4–5.3)
RBC # BLD AUTO: 2.99 10E12/L (ref 4.4–5.9)
RBC #/AREA URNS AUTO: 2 /HPF (ref 0–2)
SODIUM SERPL-SCNC: 135 MMOL/L (ref 133–144)
SODIUM SERPL-SCNC: 135 MMOL/L (ref 133–144)
SOURCE: ABNORMAL
SP GR UR STRIP: 1.01 (ref 1–1.03)
SPECIMEN SOURCE: NORMAL
SQUAMOUS #/AREA URNS AUTO: <1 /HPF (ref 0–1)
TRANS CELLS #/AREA URNS HPF: <1 /HPF (ref 0–1)
TSH SERPL DL<=0.005 MIU/L-ACNC: 0.6 MU/L (ref 0.4–4)
UROBILINOGEN UR STRIP-MCNC: NORMAL MG/DL (ref 0–2)
WBC # BLD AUTO: 6.3 10E9/L (ref 4–11)
WBC #/AREA URNS AUTO: 1 /HPF (ref 0–5)

## 2020-01-31 PROCEDURE — 25800030 ZZH RX IP 258 OP 636

## 2020-01-31 PROCEDURE — 99233 SBSQ HOSP IP/OBS HIGH 50: CPT | Performed by: INTERNAL MEDICINE

## 2020-01-31 PROCEDURE — 81001 URINALYSIS AUTO W/SCOPE: CPT | Performed by: INTERNAL MEDICINE

## 2020-01-31 PROCEDURE — 84443 ASSAY THYROID STIM HORMONE: CPT | Performed by: PHYSICIAN ASSISTANT

## 2020-01-31 PROCEDURE — 25000128 H RX IP 250 OP 636

## 2020-01-31 PROCEDURE — 80048 BASIC METABOLIC PNL TOTAL CA: CPT | Performed by: INTERNAL MEDICINE

## 2020-01-31 PROCEDURE — 36415 COLL VENOUS BLD VENIPUNCTURE: CPT | Performed by: PHYSICIAN ASSISTANT

## 2020-01-31 PROCEDURE — 25000128 H RX IP 250 OP 636: Performed by: INTERNAL MEDICINE

## 2020-01-31 PROCEDURE — 85027 COMPLETE CBC AUTOMATED: CPT | Performed by: PHYSICIAN ASSISTANT

## 2020-01-31 PROCEDURE — A9585 GADOBUTROL INJECTION: HCPCS | Performed by: INTERNAL MEDICINE

## 2020-01-31 PROCEDURE — 36415 COLL VENOUS BLD VENIPUNCTURE: CPT | Performed by: INTERNAL MEDICINE

## 2020-01-31 PROCEDURE — 97161 PT EVAL LOW COMPLEX 20 MIN: CPT | Mod: GP

## 2020-01-31 PROCEDURE — 25000132 ZZH RX MED GY IP 250 OP 250 PS 637: Performed by: STUDENT IN AN ORGANIZED HEALTH CARE EDUCATION/TRAINING PROGRAM

## 2020-01-31 PROCEDURE — 25000132 ZZH RX MED GY IP 250 OP 250 PS 637: Performed by: INTERNAL MEDICINE

## 2020-01-31 PROCEDURE — 97165 OT EVAL LOW COMPLEX 30 MIN: CPT | Mod: GO | Performed by: OCCUPATIONAL THERAPIST

## 2020-01-31 PROCEDURE — 97535 SELF CARE MNGMENT TRAINING: CPT | Mod: GO | Performed by: OCCUPATIONAL THERAPIST

## 2020-01-31 PROCEDURE — 25800030 ZZH RX IP 258 OP 636: Performed by: INTERNAL MEDICINE

## 2020-01-31 PROCEDURE — 25000132 ZZH RX MED GY IP 250 OP 250 PS 637: Performed by: PHYSICIAN ASSISTANT

## 2020-01-31 PROCEDURE — 70553 MRI BRAIN STEM W/O & W/DYE: CPT

## 2020-01-31 PROCEDURE — 40000141 ZZH STATISTIC PERIPHERAL IV START W/O US GUIDANCE

## 2020-01-31 PROCEDURE — 25500064 ZZH RX 255 OP 636: Performed by: INTERNAL MEDICINE

## 2020-01-31 PROCEDURE — 97530 THERAPEUTIC ACTIVITIES: CPT | Mod: GP

## 2020-01-31 PROCEDURE — 12000001 ZZH R&B MED SURG/OB UMMC

## 2020-01-31 PROCEDURE — 80048 BASIC METABOLIC PNL TOTAL CA: CPT | Performed by: PHYSICIAN ASSISTANT

## 2020-01-31 PROCEDURE — 97116 GAIT TRAINING THERAPY: CPT | Mod: GP

## 2020-01-31 PROCEDURE — 83605 ASSAY OF LACTIC ACID: CPT | Performed by: INTERNAL MEDICINE

## 2020-01-31 PROCEDURE — 00000146 ZZHCL STATISTIC GLUCOSE BY METER IP

## 2020-01-31 RX ORDER — LEVOFLOXACIN 750 MG/1
750 TABLET, FILM COATED ORAL DAILY
Status: DISCONTINUED | OUTPATIENT
Start: 2020-02-01 | End: 2020-02-06 | Stop reason: HOSPADM

## 2020-01-31 RX ORDER — POLYETHYLENE GLYCOL 3350 17 G/17G
17 POWDER, FOR SOLUTION ORAL DAILY
Status: DISCONTINUED | OUTPATIENT
Start: 2020-01-31 | End: 2020-02-06 | Stop reason: HOSPADM

## 2020-01-31 RX ORDER — POTASSIUM CHLORIDE 29.8 MG/ML
20 INJECTION INTRAVENOUS
Status: DISCONTINUED | OUTPATIENT
Start: 2020-01-31 | End: 2020-02-06 | Stop reason: HOSPADM

## 2020-01-31 RX ORDER — POTASSIUM CHLORIDE 750 MG/1
20-40 TABLET, EXTENDED RELEASE ORAL
Status: DISCONTINUED | OUTPATIENT
Start: 2020-01-31 | End: 2020-02-06 | Stop reason: HOSPADM

## 2020-01-31 RX ORDER — GADOBUTROL 604.72 MG/ML
7.5 INJECTION INTRAVENOUS ONCE
Status: COMPLETED | OUTPATIENT
Start: 2020-01-31 | End: 2020-01-31

## 2020-01-31 RX ORDER — SODIUM CHLORIDE, SODIUM LACTATE, POTASSIUM CHLORIDE, CALCIUM CHLORIDE 600; 310; 30; 20 MG/100ML; MG/100ML; MG/100ML; MG/100ML
INJECTION, SOLUTION INTRAVENOUS CONTINUOUS
Status: DISCONTINUED | OUTPATIENT
Start: 2020-01-31 | End: 2020-02-02

## 2020-01-31 RX ORDER — LEVOFLOXACIN 500 MG/1
500 TABLET, FILM COATED ORAL ONCE
Status: COMPLETED | OUTPATIENT
Start: 2020-01-31 | End: 2020-01-31

## 2020-01-31 RX ORDER — POTASSIUM CHLORIDE 7.45 MG/ML
10 INJECTION INTRAVENOUS
Status: DISCONTINUED | OUTPATIENT
Start: 2020-01-31 | End: 2020-02-06 | Stop reason: HOSPADM

## 2020-01-31 RX ORDER — POTASSIUM CL/LIDO/0.9 % NACL 10MEQ/0.1L
10 INTRAVENOUS SOLUTION, PIGGYBACK (ML) INTRAVENOUS
Status: DISCONTINUED | OUTPATIENT
Start: 2020-01-31 | End: 2020-02-06 | Stop reason: HOSPADM

## 2020-01-31 RX ORDER — SODIUM CHLORIDE 9 MG/ML
INJECTION, SOLUTION INTRAVENOUS CONTINUOUS
Status: DISCONTINUED | OUTPATIENT
Start: 2020-01-31 | End: 2020-01-31

## 2020-01-31 RX ORDER — POTASSIUM CHLORIDE 1.5 G/1.58G
20-40 POWDER, FOR SOLUTION ORAL
Status: DISCONTINUED | OUTPATIENT
Start: 2020-01-31 | End: 2020-02-06 | Stop reason: HOSPADM

## 2020-01-31 RX ORDER — LEVOFLOXACIN 5 MG/ML
750 INJECTION, SOLUTION INTRAVENOUS EVERY 24 HOURS
Status: DISCONTINUED | OUTPATIENT
Start: 2020-02-01 | End: 2020-01-31

## 2020-01-31 RX ADMIN — SIMVASTATIN 40 MG: 20 TABLET, FILM COATED ORAL at 22:51

## 2020-01-31 RX ADMIN — SENNOSIDES AND DOCUSATE SODIUM 2 TABLET: 8.6; 5 TABLET ORAL at 20:28

## 2020-01-31 RX ADMIN — ASPIRIN 81 MG CHEWABLE TABLET 81 MG: 81 TABLET CHEWABLE at 07:50

## 2020-01-31 RX ADMIN — ASCORBIC ACID TAB 250 MG 250 MG: 250 TAB at 22:52

## 2020-01-31 RX ADMIN — MELATONIN 1000 UNITS: at 07:49

## 2020-01-31 RX ADMIN — SENNOSIDES AND DOCUSATE SODIUM 2 TABLET: 8.6; 5 TABLET ORAL at 07:49

## 2020-01-31 RX ADMIN — CLOPIDOGREL BISULFATE 75 MG: 75 TABLET, FILM COATED ORAL at 20:28

## 2020-01-31 RX ADMIN — MELATONIN 1000 UNITS: at 20:28

## 2020-01-31 RX ADMIN — VANCOMYCIN HYDROCHLORIDE 1500 MG: 10 INJECTION, POWDER, LYOPHILIZED, FOR SOLUTION INTRAVENOUS at 00:35

## 2020-01-31 RX ADMIN — ASCORBIC ACID TAB 250 MG 250 MG: 250 TAB at 07:49

## 2020-01-31 RX ADMIN — POLYETHYLENE GLYCOL 3350 17 G: 17 POWDER, FOR SOLUTION ORAL at 11:26

## 2020-01-31 RX ADMIN — ACETAMINOPHEN 650 MG: 325 TABLET, FILM COATED ORAL at 22:51

## 2020-01-31 RX ADMIN — POTASSIUM CHLORIDE 20 MEQ: 750 TABLET, EXTENDED RELEASE ORAL at 15:03

## 2020-01-31 RX ADMIN — FERROUS SULFATE TAB 325 MG (65 MG ELEMENTAL FE) 325 MG: 325 (65 FE) TAB at 20:28

## 2020-01-31 RX ADMIN — POTASSIUM CHLORIDE 40 MEQ: 750 TABLET, EXTENDED RELEASE ORAL at 11:26

## 2020-01-31 RX ADMIN — LEVOFLOXACIN 250 MG: 5 INJECTION, SOLUTION INTRAVENOUS at 07:48

## 2020-01-31 RX ADMIN — SODIUM CHLORIDE, POTASSIUM CHLORIDE, SODIUM LACTATE AND CALCIUM CHLORIDE: 600; 310; 30; 20 INJECTION, SOLUTION INTRAVENOUS at 11:26

## 2020-01-31 RX ADMIN — GADOBUTROL 7.5 ML: 604.72 INJECTION INTRAVENOUS at 19:23

## 2020-01-31 RX ADMIN — LEVOFLOXACIN 500 MG: 500 TABLET, FILM COATED ORAL at 15:03

## 2020-01-31 RX ADMIN — FERROUS SULFATE TAB 325 MG (65 MG ELEMENTAL FE) 325 MG: 325 (65 FE) TAB at 07:49

## 2020-01-31 ASSESSMENT — ACTIVITIES OF DAILY LIVING (ADL)
ADLS_ACUITY_SCORE: 20

## 2020-01-31 ASSESSMENT — MIFFLIN-ST. JEOR: SCORE: 1599.75

## 2020-01-31 NOTE — PLAN OF CARE
Discharge Planner OT   Patient plan for discharge: home  Current status: pt scoring 18 on SLUMS indicating dementia like cognitive skills, pt refusing OOB 2/2 getting ready for imaging. Pocket talker used and pt having no difficulty with hearing therapist.   Barriers to return to prior living situation: deconditioning and decreased cognitive skills.   Recommendations for discharge: TCU  Rationale for recommendations: pt below baseline in ADL I. Pt has significant amount of stairs to enter home as well as within.        Entered by: David Huitron 01/31/2020 3:08 PM

## 2020-01-31 NOTE — CONSULTS
Consult Date:  01/30/2020      PODIATRY CONSULTATION       REQUESTING PHYSICIAN:  Johny Mccartney MD, for foot sore on the right.      SUBJECTIVE:  The patient was seen at bedside resting.  He missed his outpatient appointment, so he had to go to the hospital.  He relates his foot seems to be doing okay.  He was wearing his AFO boot on the right.  He has some abrasions he relates on the left from his boot rubbing.  He relates no specific injuries, no specific relieving or aggravating factors.  Previous notes reviewed as in the EMR as well as past medical history.      OBJECTIVE FINDINGS:  DP and PT are 2/4 bilaterally.  He has left medial and lateral ankle small eschars, dorsal foot eschar, dorsal left hallux eschar.  These are intact and sweetie.  There is no erythema, no drainage, no odor or calor.  He has dystrophied thickened nails.  He has on the right hallux and second toe with slight eschar on the lateral hallux.  There is no erythema, no drainage, no odor or calor.  The previous erythema has resolved.  There is a right lateral foot hyperkeratotic tissue present that is new.  There is some dried blood.        We debrided the wound.  There is no erythema, no drainage, no odor or calor.  Minimal foot edema.  There is a left anterior leg ulcer with a small eschar, it is closed.  There is no erythema, no drainage, no odor or calor.        Previous imaging reviewed.      ASSESSMENT AND PLAN:  Ulcer, right anterior foot.  Ulcer, right anterior leg.  Infection of right hallux with abrasions.  He has diabetic peripheral neuropathy and vascular disease and venous stasis.  All of the ulcers are closed.  He has also some eschar ulcerations on the left foot that are intact and sweetie.  His cellulitis in the right hallux and second toe is resolved.        Diagnoses and treatment options were discussed with the patient.  I think we can just clean the eschars on the left foot and just clean the right foot  with MicroKlenz daily and pat dry.  Continue his [place] brace on the right.  He can be minimal weightbearing with his [place] brace and his Charcot foot on the right as well.  We can discontinue other wound cares and I will follow him up in the clinic in 1-2 weeks.  Antibiotics as per Infectious Disease.         TOÑO TOMLINSON DPM             D: 2020   T: 2020   MT: LUIS E      Name:     RASHEED SORIANO   MRN:      [id number]        Account:       [id number]   :      1945           Consult Date:  2020      Document: [id number]       cc: Johny Swift MD

## 2020-01-31 NOTE — PLAN OF CARE
7B    Discharge Planner PT   Patient plan for discharge: Open to rehab pending LOS  Current status: Pt on 2L O2, sats 96%, drops to 89% on RA <1min. SBA for supine>sit. Requires multiple (8+ attempts) to stand from the bed, continual partial rise with 90deg flexion at hips, unable to raise trunk. Not tolerating assist to stand. SBA for gait with FWW in room, poor adherence to WB and directional cues. Multiple cognitive concerns throughout due to responses/actions, unclear how much due to [place]. Recommended OT eval.  Barriers to return to prior living situation: Medical, weakness, current mobility, cognition?  Recommendations for discharge: TCU  Rationale for recommendations: Pt currently below baseline mobility and will need ongoing skilled PT intervention to improve independence and safety with functional mobility skills prior to returning home. Recommending TCU at this time based on functional mobility, may progress to return home pending LOS.       Entered by: Ernie Hamilton 01/31/2020 1:38 PM

## 2020-01-31 NOTE — PROVIDER NOTIFICATION
"Lab called stating blood cultures from 1/29 positive for \"gram + cocci in clusters\" Temp 101, -115. Tylenol given. Provider notified.  "

## 2020-01-31 NOTE — PLAN OF CARE
[id number]    NEURO: A&Ox4, very [place]. Wearing hearing aides.  RESPIRATORY: O2 sats low 90s on 2L. Droplet and contact precautions.   CARDIO: Temp elevated, MD aware, tylenol given.   GI/: Using urinal, good output. BM x1 this shift  SKIN: Bilateral foot wounds; followed by Podiatry outpatient. Podiatry saw Pt 1/30/20. Ordered daily dressings.   ACTIVITY: Up to BR with assist x1 using walker and GB.  PAIN: Denies pain  LINES: L PIV with NS infusing at 125mL/hr.  PLAN: Continue with plan of care. Slept through the night

## 2020-01-31 NOTE — PROGRESS NOTES
01/31/20 1400   Quick Adds   Type of Visit Initial Occupational Therapy Evaluation   Living Environment   Lives With spouse   Living Arrangements condominium   Home Accessibility stairs to enter home;stairs within home   Number of Stairs, Main Entrance 5   Number of Stairs, Within Home, Primary other (see comments)   Stair Railings, Within Home, Primary   (20 to upstairs and basement. pt uses frequently. )   Transportation Anticipated family or friend will provide   Self-Care   Usual Activity Tolerance moderate   Current Activity Tolerance poor   Equipment Currently Used at Home dressing device;shower chair   Functional Level   Ambulation 1-->assistive equipment   Transferring 1-->assistive equipment   Toileting 0-->independent   Bathing 1-->assistive equipment   Dressing 1-->assistive equipment  (shoe horn only)   Eating 0-->independent   Communication 0-->understands/communicates without difficulty   Swallowing 0-->swallows foods/liquids without difficulty   Cognition 0 - no cognition issues reported   Fall history within last six months no   General Information   Referring Physician Johny Mccartney.    Patient/Family Goals Statement return to [facility] and buy a new house without stairs.    Additional Occupational Profile Info/Pertinent History of Current Problem Amos Walker is a 73 yo M w/ chronic tobacco use, T2DM c/b charcot joint, CKD, CAD (s/p PCI 3/18), and PAD presenting w/ productive cough, fever, and fatigue admitted for CAP   Precautions/Limitations fall precautions   General Observations pt with tremor in B UE's at times of rest and acitivity however intermittent.    Cognitive Status Examination   Orientation orientation to person, place and time   Level of Consciousness alert   Follows Commands (Cognition) WNL   Memory impaired   Attention Distractible during evaluation   Organization/Problem Solving Reports problems with organization   Cognitive Comment further testing required.    Visual  Perception   Visual Perception No deficits were identified;Wears glasses   Sensory Examination   Sensory Quick Adds No deficits were identified   Range of Motion (ROM)   ROM Quick Adds No deficits were identified   ROM Comment B UE's WFL   Strength   Manual Muscle Testing Quick Adds Other   Strength Comments B UE's grossly 3+ to 4/5   Hand Strength   Hand Strength Comments mild deficits in B hands.    Coordination   Coordination Comments pt ith tremor in B hands causing deficits with self feeding and writing.    Lower Body Dressing   Level of [place]: Dress Lower Body moderate assist (50% patients effort)   Instrumental Activities of Daily Living (IADL)   IADL Comments SO does all the cooking and household chores, pt is responsible for his own medication managment, SO able to assist if needed.    Activities of Daily Living Analysis   Impairments Contributing to Impaired Activities of Daily Living cognition impaired;coordination impaired;strength decreased   General Therapy Interventions   Planned Therapy Interventions ADL retraining;IADL retraining;cognition;fine motor coordination training;strengthening;transfer training;home program guidelines;progressive activity/exercise;risk factor education   Clinical Impression   Criteria for Skilled Therapeutic Interventions Met yes, treatment indicated   OT Diagnosis decreased ADL I   Assessment of Occupational Performance 5 or more Performance Deficits   Identified Performance Deficits dressing, bathing, toileting, G/H, leisure, home making.    Clinical Decision Making (Complexity) Low complexity   Therapy Frequency 5x/week   Predicted Duration of Therapy Intervention (days/wks) 2 weeks   Anticipated Discharge Disposition Transitional Care Facility   Risks and Benefits of Treatment have been explained. Yes   Patient, Family & other staff in agreement with plan of care Yes   Clinical Impression Comments Pt presents to OT with general deconditioning, cognitive deifict  "and tremor leading to decreased ADL I. Pt to benefit from skilled OT intervention to address the above problem list. See daily note for treatment provided today.    [facility] TM \"6 Clicks\"   2016, Trustees of [facility], under license to [employer].  All rights reserved.   6 Clicks Short Forms Daily Activity Inpatient Short Form   [facility] AM-PAC  \"6 Clicks\" Daily Activity Inpatient Short Form   1. Putting on and taking off regular lower body clothing? 2 - A Lot   2. Bathing (including washing, rinsing, drying)? 2 - A Lot   3. Toileting, which includes using toilet, bedpan or urinal? 2 - A Lot   4. Putting on and taking off regular upper body clothing? 3 - A Little   5. Taking care of personal grooming such as brushing teeth? 4 - None   6. Eating meals? 4 - None   Daily Activity Raw Score (Score out of 24.Lower scores equate to lower levels of function) 17   Total Evaluation Time   Total Evaluation Time (Minutes) 5     "

## 2020-01-31 NOTE — PHARMACY-VANCOMYCIN DOSING SERVICE
Pharmacy Vancomycin Initial Note  Date of Service 2020  Patient's  1945  74 year old, adult    Indication: Bacteremia    Current estimated CrCl = Estimated Creatinine Clearance (based on SCr of 1.89 mg/dL (H))  Female: 31.5 mL/min (A)  Male: 37.1 mL/min (A)    Creatinine for last 3 days  2020:  5:06 PM Creatinine 1.90 mg/dL  2020:  1:04 AM Creatinine 1.97 mg/dL;  5:24 AM Creatinine 1.89 mg/dL    Recent Vancomycin Level(s) for last 3 days  No results found for requested labs within last 72 hours.      Vancomycin IV Administrations (past 72 hours)      No vancomycin orders with administrations in past 72 hours.                Nephrotoxins and other renal medications (From now, onward)    Start     Dose/Rate Route Frequency Ordered Stop    20 0000  vancomycin 1500 mg in 0.9% NaCl 250 ml intermittent infusion 1,500 mg      1,500 mg  over 90 Minutes Intravenous EVERY 24 HOURS 20 2358            Contrast Orders - past 72 hours (72h ago, onward)    None                Plan:  1.  Start vancomycin  1500 mg IV q24h.   2.  Goal Trough Level: 15-20 mg/L   3.  Pharmacy will check trough levels as appropriate in 1-3 Days.    4. Serum creatinine levels will be ordered daily for the first week of therapy and at least twice weekly for subsequent weeks.    5. [place] method utilized to dose vancomycin therapy: Method 2    Ángel Alegre [facility]

## 2020-01-31 NOTE — PROGRESS NOTES
Focus:  Status  D:  Monitoring status  I:   Vitals taken        Encourage to put call light on for assist        Both hearing aids in but pt continued to ask nurse to repeat herself. Nurse asked pt if he needed to raise the volume on the hearing aids         amb to BR with assist of 1 and walker          Pt refused to allow nurse to assess his coccyx/buttocks         Glucose monitored         Podiatrist came to assess pt food and wrote new orders  A:   Denied resp distress/cp nor any signs observed  P:    Report to oncoming nurse

## 2020-01-31 NOTE — PLAN OF CARE
Vitals:    01/30/20 2230 01/30/20 2300 01/31/20 0605 01/31/20 0837   BP:  (!) 151/68  (!) 163/78   BP Location:  Left arm  Left arm   Pulse:       Resp:  20 19   Temp:  101  F (38.3  C) 96.7  F (35.9  C) 99.2  F (37.3  C)   TempSrc:  Oral Oral Oral   SpO2: 92% 94%  95%   Weight:       Height:        T-max 99.2, hypertensive, sating 95% on 2 lit O2 NC, lungs diminished at the base, hard of hearing, denies pain,   Voiding adequate, UA sample sent to lab, K+ replaced, LR at 75 CC, audible BS, BM yesterday.   Up and ambulated with stand by and walker, MRI check list completed,  plan for brain MRI today,   BG this morning 226-239 sliding scale insulin, wife at bed side, continue with plan of care.

## 2020-01-31 NOTE — PROGRESS NOTES
Antimicrobial Stewardship Team Note    Antimicrobial Stewardship Program - A joint venture between [facility] and  Physicians to optimize antibiotic management.  NOT a formal consult - Restricted Antimicrobial Review     Patient: Amos Walker  MRN: [id number]  Allergies: Lisinopril; Neomycin; Methylchloroisothiazolinone [methylisothiazolinone]; and Povidone iodine    Brief Summary: Amos Walker is a 75 yo male with a PMH significant for chronic RLE wounds, PAD s/p PCI 3/2018, DMII, past MRSA cellulitis, HLD, HTN, emphysema, chronic tobacco use, stage III CKD, CAD, and anemia who was admitted to [facility] on 1/29/20 for community-acquired pneumonia.     HPI: On 1/29/20 patient presented to [facility] ED with generalized weakness, cough, and a right foot wound check. Upon ED evaluation patient was notable for cough, right lower extremity wounds, tachycardia (90-100s), tachypnea (24-28), fever (101.6) lactic acid of 1.3. CXR in ED showing large right lower lobe focal airspace opacity. Blood cultures x2 were drawn prior to administration of IV azithromycin and ceftriaxone for suspected CAP. Prior to admission, on 1/25/20 patient was started on cefdinir by podiatry for a planned 28-day course for treatment of right lower extremity wounds in setting of DMII and PAD. Cefdinir is currently being held with initiation of IV antibiotics but podiatry continues to follow patient on admission. F/U chest CT w/o contrast on 1/29 visualized dense consolidative alveolar infiltrate RLL with hazy ill-defined infiltrate in RUL and RML compatible with pneumonia. 1/2 blood cultures showed gram-positive cocci on gram stain ~28 hours after collection confirmed to be S. Epidermidis, positive for mecA on Verigene. Legionalla urine antigen resulted positive. No sputum cultures have been obtained for patient. MRSA nares PCR, respiratory panel PCR, and Strep pneumo antigen all resulted negative. Azithromycin and ceftriaxone was switched  to IV levofloxacin due to positive Legionella antigen and patient was started on IV vancomycin for S. epidermidis in blood cultures. Last QTc was 426 on 10/30/2019. Vancomycin has since been discontinued. Patient has had a repeat fever of 101 on 1/30 but has been without leukocytosis throughout admission. Patient has required some supplemental oxygen, 2-3 L via nasal cannula throughout admission.          Active Anti-infective Medications   (From admission, onward)                Start     Stop    01/31/20 0800  levofloxacin  250 mg,   Intravenous,   50 mL/hr,   EVERY 24 HOURS     Community Acquired Pneumonia    legionella        --    01/31/20 0000  vancomycin 1500 mg  1,500 mg,   Intravenous,   EVERY 24 HOURS     Bacteremia        --          Assessment: [Legionella pneumonia, community acquired]. Patient appears to have community-acquired Legionella pneumonia. Findings suggestive of Legionella pneumonia include imaging, new onset cough, increased oxygen requirements, fevers, and positive Legionella urine antigen. Recent studies have shown equivalent efficacy of fluoroquinolones and azithromycin in the treatment of Legionella pneumonia. However, levofloxacin provides better coverage of potential pathogens suspected in patient's known chronic RLE wounds. Patient is currently receiving levofloxacin  mg every 24 hours, which is too low of a dose for pneumonia treatment with his current CrCl. Levofloxacin has an oral bioavailability of 99%, indicative of equivalent drug concentrations expected with IV and oral dosing. Since patient has an advanced diet and is tolerating oral medications recommend IV to PO conversion to a dose of levofloxacin  mg every 24 hours. Legionella pneumonia can be associated with increased morbidity and mortality and patient has risk factors at baseline that may slow recovery. Recommend patient receive a 7-day course of therapy with monitoring for clinical stability.     [MRSE in  1/2 blood cultures]. Culture results are suspicious for contamination due to growth in 1/2 cultures, delayed time to positivity suggesting low innoculum (28 hours), likely colonization of skin by S. epidermidis, and peripheral draw of blood culture. Furthermore, patient does have other known infectious process of Legionella pneumonia, that would explain patient's systemic signs of infection. Recommend not targeting MRSE in blood as infectious process. Agree with discontinuation of vancomycin. Recommend repeat blood cultures to ensure clearance.     Recommendations:  Increase dose of levofloxacin and convert IV to PO--levofloxacin  mg every 24 hours x 7 days (stop date 2/6/20).  Recommend repeat blood cultures to ensure clearance.     Discussed with ID Staff, Wes Wright MD and ID pharmacy staff, Johanna Jimenez [facility]  Margarita DeeD IV Candidate    Vital Signs/Clinical Features:  Vitals         01/29 0700  -  01/30 0659 01/30 0700  -  01/31 0659 01/31 0700  -  01/31 1248   Most Recent    Temp ( F) 98.7 -  101.6    96.7 -  101      99.2     99.2 (37.3)    Pulse 70 -  79         79    Heart Rate 68 -  106    96 -  115      95     95    Resp 16 -  27    16 -  20      19     19    BP 95/52 -  129/72    130/71 -  159/65      163/78     163/78    SpO2 (%) 90 -  99    90 -  94      95     95            Labs  Estimated Creatinine Clearance (based on SCr of 1.13 mg/dL)  Female: 52.7 mL/min  Male: 62 mL/min  Recent Labs   Lab Test 10/30/19  1620 11/01/19  0552 01/29/20  1706 01/30/20  0104 01/30/20  0524 01/31/20  0922   CR 1.35* 1.12 1.90* 1.97* 1.89* 1.13       Recent Labs   Lab Test 02/27/18  1016  03/05/18  1303  09/14/18  0614  07/10/19  1502 10/30/19  1620 10/30/19  2347 10/31/19  1048 10/31/19  1249 11/01/19  1052 01/29/20  1706 01/30/20  0524 01/31/20  0922   WBC 7.9   < > 7.2   < > 6.0   < > 6.5 14.2* 7.6 7.7  --  7.9 5.5 5.6 6.3   ANEU 6.5  --  5.8  --  3.6  --  5.2 12.1*  --   --   --   --  5.5  --   --     ALYM 0.8  --  0.7*  --  1.6  --  0.7* 0.9  --   --   --   --  0.0*  --   --    KATHY 0.5  --  0.5  --  0.5  --  0.5 1.1  --   --   --   --  0.0  --   --    AEOS 0.1  --  0.1  --  0.2  --  0.1 0.0  --   --   --   --  0.0  --   --    HGB 8.6*   < > 9.1*   < > 10.6*   < > 8.5* 12.2* 10.5* 9.4* 9.4* 9.5* 9.2* 8.9* 9.4*   HCT 27.5*   < > 28.4*   < > 33.3*   < > 26.9* 38.3* 32.1* 29.7*  --  30.0* 27.3* 27.0* 28.4*   MCV 94   < > 95   < > 97   < > 94 99 96 99  --  98 95 95 95      < > 257   < > 207   < > 286 160 134* 108*  --  122* 117* 111* 135*    < > = values in this interval not displayed.       Recent Labs   Lab Test 11/18/14  0853 10/31/16  1205 03/27/19  0934 10/30/19  1620 01/29/20  1706   BILITOTAL  --   --  0.5 0.7 1.0   ALKPHOS  --   --  119 128 173*   ALBUMIN  --   --  3.2* 3.6 2.0*   AST  --   --  14 20 68*   ALT 15 14 9 16 26       Recent Labs   Lab Test 06/29/17  1212 11/14/17  1536 11/21/17  0756 10/30/19  1748 01/29/20  1706   LACT  --   --   --  1.1 1.3   CRP <2.9 59.6* 4.8  --   --    SED  --  49* 40*  --   --              Culture Results:  7-Day Micro Results       Procedure Component Value Units Date/Time    Methicillin Resistant Staph Aureus PCR [[id number]] Collected:  01/30/20 6630    Order Status:  Completed Lab Status:  Final result Updated:  01/31/20 0124    Specimen:  Nares      Specimen Description Nares     Methicillin Resist/Sens S. aureus PCR Negative     Comment: MRSA Negative: SA Negative  MRSA and Staphylococcus aureus target DNA not   detected, presumed negative for MRSA and SA colonization or the number of   bacteria present may be below the limit of detection for the assay. FDA   approved assay performed using [employer] GeneXpert(R) real-time PCR.         Strep pneumo Agn Urine or CSF [[id number]] Collected:  01/30/20 0703    Order Status:  Completed Lab Status:  Final result Updated:  01/30/20 1056    Specimen:  Urine      Specimen Description Urine     S Pneumoniae Antigen Negative, no  Streptococcus pneumoniae antigen detected by immunochromatographic membrane   assay. A negative Streptococcus pneumoniae antigen result does not rule out infection with   Streptococcus pneumoniae.      Respiratory Panel PCR - NP Swab [[id number]] Collected:  01/30/20 0236    Order Status:  Completed Lab Status:  Final result Updated:  01/30/20 1321    Specimen:  Nasopharyngeal swab      Adenovirus Not Detected     Coronavirus Not Detected     Comment: This test detects Coronavirus 229E, HKU1, NL63, and OC43 but does not   distinguish between them. It does not detect MERS ( Respiratory   Syndrome), SARS (Severe Acute Respiratory Syndrome) or 2019-nCoV (2019 Novel)   Coronavirus.          Human Metapneumovirus Not Detected     Human Rhinovirus/Enterovirus Not Detected     Influenza A Not Detected     Influenza A, H1 Not Detected     Influenza A 2009 H1N1 Not Detected     Influenza A, H3 Not Detected     Influenza B Not Detected     Parainfluenza Virus 1 Not Detected     Parainfluenza Virus 2 Not Detected     Parainfluenza Virus 3 Not Detected     Parainfluenza Virus 4 Not Detected     Respiratory Syncytial Virus A Not Detected     Respiratory Syncytial Virus B Not Detected     Chlamydia pneumoniae Not Detected     Mycoplasma pneumoniae Not Detected     Respiratory Virus Comment See comment below     Comment:    The e[employer]lex Respiratory Viral Panel is a qualitative nucleic acid, multiplex, in   vitro diagnostic test for the simultaneous detection and identification of   multiple respiratory viral and bacterial nucleic acids in nasopharyngeal swabs   collected in viral transport media from individuals exhibiting signs and   symptoms of respiratory infection.  The test detects Adenovirus, Coronavirus, Human Metapneumovirus, Human   Rhinovirus/Enterovirus, Influenza A (H1, H3, 2009 H1N1), Influenza B,   Respiratory Syncytial Virus A, Respiratory Syncytial Virus B, Parainfluenza   Virus (1, 2, 3, 4), Chlamydia  pneumoniae and Mycoplasma pneumoniae.  The assay has received FDA approval for the testing of nasopharyngeal (NP)   swabs only. This test has been verified and is performed by the [facility]   [facility] at [facility]. This test is used for   clinical purposes and should not be regarded as investigational or for   research.  This laboratory is certified under the Clinical Laboratory Improvement   Amendments of 1988 (CLIA-88) as qualified to perform high complexity clinical   laboratory testing.         Blood culture [[id number]]  (Abnormal) Collected:  01/29/20 1832    Order Status:  Completed Lab Status:  Preliminary result Updated:  01/31/20 0056    Specimen:  Blood      Specimen Description Blood Right Arm     Culture Micro Cultured on the 1st day of incubation:  Gram positive cocci in clusters        Critical Value/Significant Value, preliminary result only, called to and read back by  Sara Mills RN, @9561 01/30/20.DH.        (Note)  POSITIVE for STAPHYLOCOCCUS EPIDERMIDIS and POSITIVE for the mecA  gene (resistant to methicillin) by JAB Broadbandigene multiplex nucleic acid  test. Final identification and antimicrobial susceptibility testing  will be verified by standard methods.    Specimen tested with Verigene multiplex, gram-positive blood culture  nucleic acid test for the following targets: Staph aureus, Staph  epidermidis, Staph lugdunensis, other Staph species, Enterococcus  faecalis, Enterococcus faecium, Streptococcus species, S. agalactiae,  S. anginosus grp., S. pneumoniae, S. pyogenes, Listeria sp., mecA  (methicillin resistance) and Vivien/B (vancomycin resistance).    Critical Value/Significant Value called to and read back by  Sara Mills RN 1/31/20 @ 0055 TF      Influenza A/B antigen [[id number]] Collected:  01/29/20 1816    Order Status:  Completed Lab Status:  Edited Result - FINAL Updated:  01/29/20 1913    Specimen:  Nasopharyngeal      Influenza A/B Agn Specimen Nasopharyngeal      Influenza A Negative     Influenza B Negative     Comment: Test results must be correlated with clinical data. If necessary, results   should be confirmed by a molecular assay or viral culture.         Blood culture [[id number]] Collected:  01/29/20 1706    Order Status:  Completed Lab Status:  Preliminary result Updated:  01/31/20 0413    Specimen:  Blood from Arm, Right      Specimen Description Blood Left Arm     Culture Micro No growth after 2 days            Recent Labs   Lab Test 11/18/14  0901 08/24/17  2227 09/19/17  1837 10/30/19  1710 01/31/20  1145   URINEPH 5.5 6.0 5.0 5.5 5.5   NITRITE Negative Negative Negative Negative Negative   LEUKEST Negative Negative Negative Small* Negative   WBCU O - 2 <1 <1 0 1             Recent Labs   Lab Test 01/30/20  0236   IFLUA Not Detected   FLUAH1 Not Detected   FLUAH3 Not Detected   [id number] Not Detected   IFLUB Not Detected   RSVA Not Detected   RSVB Not Detected   PIV1 Not Detected   PIV2 Not Detected   PIV3 Not Detected   HMPV Not Detected       Recent Labs   Lab Test 10/31/19  1115   CDBPCT Negative       Imaging: Xr Chest 2 Views    Result Date: 1/29/2020  EXAMINATION: XR CHEST 2 VW, 1/29/2020 6:01 PM INDICATION: cough and fever COMPARISON: Chest radiograph 10/30/2019 FINDINGS: AP and lateral upright views of the chest. Trachea is midline. Cardiomediastinal borders are clear. Cardiac silhouette is not enlarged. Right lower lobe large focal airspace opacity. Left lower lung is unremarkable. No appreciable pleural effusion. No pneumothorax. No acute osseous abnormalities. Soft tissues are grossly unremarkable.      IMPRESSION: Large right lower lobe focal airspace opacity likely pneumonia. I have personally reviewed the examination and initial interpretation and I agree with the findings. DAVONTE BEDOYA MD    Ct Chest W/o Contrast    Result Date: 1/29/2020  EXAM: CT CHEST W/O CONTRAST LOCATION: [facility] DATE/TIME: 1/29/2020 10:32 PM INDICATION:  Productive cough with blood-tinged sputum. Evaluate for malignancy or etiology for cough. COMPARISON: 05/11/2018. TECHNIQUE: CT chest without IV contrast. Multiplanar reformats were obtained. Dose reduction techniques were used. CONTRAST: None. FINDINGS: LUNGS AND PLEURA: Dense consolidative alveolar infiltrate involving the right lower lobe with air bronchogram formation. Findings compatible with pneumonia. Minimal hazy ill-defined infiltrate posterior aspect of the right upper lobe and right middle lobe. No evidence for acute focal infiltrate or consolidation left lung. Minimal right basilar pleural fluid. Moderate emphysema. Bronchial wall thickening in the right lung related to the inflammatory change. MEDIASTINUM/AXILLAE: Allowing for the noncontrast study there is no evidence for overt lymphadenopathy. Normal heart size. No pericardial fluid. Advanced atherosclerotic vascular calcification. Normal caliber esophagus. No axillary lymphadenopathy. UPPER ABDOMEN: Partially visualized 1 cm calculus within the gallbladder lumen. Adrenal glands negative. MUSCULOSKELETAL: Minimal degenerative changes in the spine.     IMPRESSION: 1.  Dense consolidative alveolar infiltrate right lower lobe with hazy ill-defined infiltrate posterior aspect right upper lobe and right middle lobe. Findings compatible with pneumonia. Recommend continued CT follow-up to assess for complete resolution given the dense consolidation as a right infrahilar mass or lesion within the right lower lung cannot be excluded. 2.  Moderate emphysema. 3.  Minimal right basilar pleural fluid. 4.  Advanced atherosclerotic vascular calcification including coronary artery calcification. 5.  Partially visualized 1 cm calculus in the gallbladder lumen.

## 2020-01-31 NOTE — PROGRESS NOTES
"   01/31/20 1300   Quick Adds   Type of Visit Initial PT Evaluation   Living Environment   Lives With spouse   Living Arrangements condominium   Transportation Anticipated family or friend will provide   Living Environment Comment 5 steps to enter with rail, 16-18 steps from basement level which pt uses and bedroom. Walk-in shower with shower chair   Self-Care   Usual Activity Tolerance moderate   Current Activity Tolerance fair   Regular Exercise No   Equipment Currently Used at Home walker, rolling;crutches;tub bench   Activity/Exercise/Self-Care Comment Mod-I for ADLs   Functional Level Prior   Ambulation 1-->assistive equipment   Transferring 1-->assistive equipment   Toileting 1-->assistive equipment   Bathing 1-->assistive equipment   Communication 0-->understands/communicates without difficulty   Swallowing 0-->swallows foods/liquids without difficulty   Cognition 0 - no cognition issues reported   Fall history within last six months no   Number of times patient has fallen within last six months   (states none)   Which of the above functional risks had a recent onset or change? ambulation;transferring   Prior Functional Level Comment Pt reports mod-I for mobility, was NWB, but reports he was recently allowed to WB   General Information   Onset of Illness/Injury or Date of Surgery - Date 01/29/20   Referring Physician Dr. Mccartney   Patient/Family Goals Statement Improve cough, get stronger, return home   Pertinent History of Current Problem (include personal factors and/or comorbidities that impact the POC) EMR: 73 yo male with a PMH significant for chronic RLE wounds, PAD s/p PCI 3/2018, DMII, past MRSA cellulitis, HLD, HTN, emphysema, chronic tobacco use, stage III CKD, CAD, and anemia who was admitted to [facility] on 1/29/20 for community-acquired pneumonia.    Precautions/Limitations fall precautions   Weight-Bearing Status - RLE partial weight-bearing (% in comments)  (\"Ok for minimal weight bearing and " Physical therapy with Sh[facility])   General Observations Pt on 2L at rest, no O2 at baseline   General Info Comments Very [place]   Cognitive Status Examination   Orientation person;place   Level of Consciousness alert   Follows Commands and Answers Questions 50% of the time   Cognitive Comment Difficult to fully assess cognition. Multiple in-correct response, difficult to determine if just [place]   Pain Assessment   Patient Currently in Pain No   Integumentary/Edema   Integumentary/Edema no deficits were identifed   Posture    Posture Forward head position;Protracted shoulders   Range of Motion (ROM)   ROM Comment WFL   Strength   Strength Comments R: hip flex 4-/5, knee ext 4/5 ; L: hip flex 4/5, knee ext 5/5   Bed Mobility   Bed Mobility Comments Mod-I supine>sit with rail   Transfer Skills   Transfer Comments Unable to safely rise with CGA, requires multiple attempts, does not tolerate assist. Eventually rise with multiple attempts   Gait   Gait Comments SBA with FWW, requires reminders for WB status, reporting he forgot mid gait, generally steady, but fatiguing   Balance   Balance Comments Fair dynamic balance with UE support   General Therapy Interventions   Planned Therapy Interventions bed mobility training;gait training;balance training;strengthening;transfer training;risk factor education;home program guidelines;progressive activity/exercise   Clinical Impression   Criteria for Skilled Therapeutic Intervention yes, treatment indicated   PT Diagnosis Impaired functional mobility   Influenced by the following impairments Medical/respiratory, weakness, WB status   Functional limitations due to impairments Transfers, gait, stairs   Clinical Presentation Stable/Uncomplicated   Clinical Presentation Rationale Clinical judgement   Clinical Decision Making (Complexity) Low complexity   Therapy Frequency 5x/week   Predicted Duration of Therapy Intervention (days/wks) 7 days   Anticipated Discharge Disposition Transitional Care  "Facility   Risk & Benefits of therapy have been explained Yes   Patient, Family & other staff in agreement with plan of care Yes   Clinical Impression Comments Recommending TCU at this time based on functional mobility, may progress to return home with LOS   [facility] TM \"6 Clicks\"   2016, Trustees of [facility], under license to [employer].  All rights reserved.   6 Clicks Short Forms Basic Mobility Inpatient Short Form   [facility]  \"6 Clicks\" V.2 Basic Mobility Inpatient Short Form   1. Turning from your back to your side while in a flat bed without using bedrails? 4 - None   2. Moving from lying on your back to sitting on the side of a flat bed without using bedrails? 4 - None   3. Moving to and from a bed to a chair (including a wheelchair)? 3 - A Little   4. Standing up from a chair using your arms (e.g., wheelchair, or bedside chair)? 3 - A Little   5. To walk in hospital room? 3 - A Little   6. Climbing 3-5 steps with a railing? 3 - A Little   Basic Mobility Raw Score (Score out of 24.Lower scores equate to lower levels of function) 20   Total Evaluation Time   Total Evaluation Time (Minutes) 10     "

## 2020-02-01 LAB
BACTERIA SPEC CULT: ABNORMAL
GLUCOSE BLDC GLUCOMTR-MCNC: 183 MG/DL (ref 70–99)
GLUCOSE BLDC GLUCOMTR-MCNC: 190 MG/DL (ref 70–99)
GLUCOSE BLDC GLUCOMTR-MCNC: 205 MG/DL (ref 70–99)
GLUCOSE BLDC GLUCOMTR-MCNC: 219 MG/DL (ref 70–99)
GLUCOSE BLDC GLUCOMTR-MCNC: 225 MG/DL (ref 70–99)
SPECIMEN SOURCE: ABNORMAL

## 2020-02-01 PROCEDURE — 25000132 ZZH RX MED GY IP 250 OP 250 PS 637: Performed by: PHYSICIAN ASSISTANT

## 2020-02-01 PROCEDURE — 99233 SBSQ HOSP IP/OBS HIGH 50: CPT | Performed by: INTERNAL MEDICINE

## 2020-02-01 PROCEDURE — 00000146 ZZHCL STATISTIC GLUCOSE BY METER IP

## 2020-02-01 PROCEDURE — 25000132 ZZH RX MED GY IP 250 OP 250 PS 637: Performed by: STUDENT IN AN ORGANIZED HEALTH CARE EDUCATION/TRAINING PROGRAM

## 2020-02-01 PROCEDURE — 25000132 ZZH RX MED GY IP 250 OP 250 PS 637: Performed by: INTERNAL MEDICINE

## 2020-02-01 PROCEDURE — 25800030 ZZH RX IP 258 OP 636: Performed by: INTERNAL MEDICINE

## 2020-02-01 PROCEDURE — 12000001 ZZH R&B MED SURG/OB UMMC

## 2020-02-01 RX ADMIN — SIMVASTATIN 40 MG: 20 TABLET, FILM COATED ORAL at 22:00

## 2020-02-01 RX ADMIN — ASCORBIC ACID TAB 250 MG 250 MG: 250 TAB at 19:47

## 2020-02-01 RX ADMIN — SENNOSIDES AND DOCUSATE SODIUM 2 TABLET: 8.6; 5 TABLET ORAL at 19:47

## 2020-02-01 RX ADMIN — LEVOFLOXACIN 750 MG: 750 TABLET, FILM COATED ORAL at 07:54

## 2020-02-01 RX ADMIN — POLYETHYLENE GLYCOL 3350 17 G: 17 POWDER, FOR SOLUTION ORAL at 07:55

## 2020-02-01 RX ADMIN — MELATONIN 1000 UNITS: at 07:54

## 2020-02-01 RX ADMIN — MELATONIN 1000 UNITS: at 19:47

## 2020-02-01 RX ADMIN — SENNOSIDES AND DOCUSATE SODIUM 2 TABLET: 8.6; 5 TABLET ORAL at 07:55

## 2020-02-01 RX ADMIN — SODIUM CHLORIDE, POTASSIUM CHLORIDE, SODIUM LACTATE AND CALCIUM CHLORIDE: 600; 310; 30; 20 INJECTION, SOLUTION INTRAVENOUS at 23:42

## 2020-02-01 RX ADMIN — ASCORBIC ACID TAB 250 MG 250 MG: 250 TAB at 07:54

## 2020-02-01 RX ADMIN — CLOPIDOGREL BISULFATE 75 MG: 75 TABLET, FILM COATED ORAL at 19:46

## 2020-02-01 RX ADMIN — SODIUM CHLORIDE, POTASSIUM CHLORIDE, SODIUM LACTATE AND CALCIUM CHLORIDE: 600; 310; 30; 20 INJECTION, SOLUTION INTRAVENOUS at 07:04

## 2020-02-01 RX ADMIN — ASPIRIN 81 MG CHEWABLE TABLET 81 MG: 81 TABLET CHEWABLE at 07:54

## 2020-02-01 RX ADMIN — FERROUS SULFATE TAB 325 MG (65 MG ELEMENTAL FE) 325 MG: 325 (65 FE) TAB at 19:47

## 2020-02-01 RX ADMIN — FERROUS SULFATE TAB 325 MG (65 MG ELEMENTAL FE) 325 MG: 325 (65 FE) TAB at 07:54

## 2020-02-01 ASSESSMENT — ACTIVITIES OF DAILY LIVING (ADL)
ADLS_ACUITY_SCORE: 20

## 2020-02-01 NOTE — PLAN OF CARE
Alert and oriented. Denies pain and nausea. BP elevated but did not meet parameters. Voiding in the urinal. LR infusing at 75 ml/hr.

## 2020-02-01 NOTE — PROGRESS NOTES
Social Work: Assessment with Discharge Plan    Patient Name:  Amos Walker  :  1945  Age:  74 year old  MRN:  [id number]  Risk/Complexity Score:     Completed assessment with:  Pt & wife at bedside    Presenting Information   Reason for Referral:  Discharge plan  Date of Intake:  2020  Referral Source:  Chart Review  Decision Maker:  Pt at baseline  Alternate Decision Maker:  NOK - wife  Health Care Directive:  Patient considering completing  Living Situation:  Condo w/ wife w/ 5 stairs to enter. 16-18 steps from basement level which pt uses and bedroom. Walk-in shower with shower chair  Previous Functional Status:  Independent  Patient and family understanding of hospitalization:  Pneumonia  Cultural/Language/Spiritual Considerations:  Pt is 75 yo male, Caucasion, , english speaking and has strong ties to his [employer] joan.  Adjustment to Illness:  Patient states he's starting to feel better, but does not feel medically ready for discharge.    Physical Health  Reason for Admission:    1. Dehydration    2. Hyponatremia    3. Fever, unspecified fever cause    4. Pneumonia of right lower lobe due to infectious organism (H)    5. Hyposmolality syndrome    6. Hyperthermia-induced defect    7. Other pneumonia, unspecified organism    8. Type 2 diabetes mellitus without complication, with long-term current use of insulin (H)      Services Needed/Recommended:  TCU    Mental Health/Chemical Dependency  Diagnosis:  None per chart  Support/Services in Place:  NA  Services Needed/Recommended:  None    Support System  Significant relationship at present time:  Wife  Family of origin is available for support:  Wife and children  Other support available:  YES  Gaps in support system:  NO  Patient is caregiver to:  None     Provider Information   Primary Care Physician:  Racheal Swift   [phone]   Clinic:  [address] [place] [address] / [facility] [address]      :   None    Financial   Income Source:  Retired  Financial Concerns:  None indicated  Insurance:    Payor/Plan Subscriber Name Rel Member # Group #   AETNA - AETNA MEDICAR* SHANICE WALKERBTSDRGIORGIO [id number]      [address]       Discharge Plan   Patient and family discharge goal:  TCU - Pt has had previous TCU stay in 2017 and reports a positive experience  Provided education on discharge plan:  YES  Patient agreeable to discharge plan:  YES  A list of Medicare Certified Facilities was provided to the patient and/or family to encourage patient choice. Patient's choices for facility are:    1. [facility]  - [facility]  - [facility]  - [facility]  - [facility]  Will NH provide Skilled rehabilitation or complex medical:  YES  General information regarding anticipated insurance coverage and possible out of pocket cost was discussed. Patient and patient's family are aware patient may incur the cost of transportation to the facility, pending insurance payment: YES  Barriers to discharge:  Medical stability; rehab placement    Discharge Recommendations   Anticipated Disposition:  Facility:  TBD  Transportation Needs:  Family:  wife vs WC ride  Name of Transportation Company and Phone:  [facility] (Ph: [phone]) if needed    Additional comments   Amos Walker is a 73 yo M w/ chronic tobacco use, T2DM c/b charcot joint, CKD, CAD (s/p PCI 3/18), and PAD presenting w/ productive cough, fever, and fatigue admitted for CAP. SW involved for discharge planning - TCU recommended.     SW met w/ Pt and then later wife at bedside for psychosocial assessment and to discuss TCU recommendation. Patient & wife are agreeable to TCU rec and reports a positive experience at [facility] in the past. SW discussed Pt's new insurance as of 2020 (A[employer]na  Advantage) and the uncertainty of which facilities are within network. Patient and wife state [facility] is their top preference to return. Patient is currently on O2 which is new for him as well and will need to be monitored closer to discharge if needed for transport. TCU referrals sent:  1. [facility] (P: [phone]; F: [phone])  - [facility] (P: [phone]; F: [phone])  - [facility] (P: [phone]; F: [phone])  - [facility] (P: [phone]; F: [phone])  - [facility] (P: [phone]; F: [phone])      For weekend social work needs, contact information below and available on [facility]:  4A, 4C, 4E, 5A, 5B  pager [phone]  6A, 6B, 6C, 6D  pager [phone]  7A, 7B, 7C, 7D, 5C  pager [phone]    For weekend RN care coordinator needs (home discharge with needs including home care, assisted living facility returns, durable medical equip, IV antibiotics) - page via job code [id number]    BRENDON Butler, SOM  Weekend  on 2/1/20  [facility]  Pager: [phone]

## 2020-02-01 NOTE — PLAN OF CARE
Vitals:    01/31/20 2245 01/31/20 2300 02/01/20 0107 02/01/20 0727   BP: (!) 164/66  (!) 153/63 (!) 157/69   BP Location: Right arm  Right arm Right arm   Pulse:       Resp: 22 24 24   Temp: 101.3  F (38.5  C) 99.8  F (37.7  C) 98.5  F (36.9  C) 98.2  F (36.8  C)   TempSrc: Oral Oral Oral Oral   SpO2:    96%   Weight:       Height:        Afebrile BP remain hypertensive, sating 96%, non labor breathing, non productive cough,  Bringing up clear thick mucus, denies any pain, voiding adequate, +BS, bowel regimen provided, up ambulated with one assist and walker, tolerating oral intake, blood sugar with sliding scale and carb coverage, continue with plan of care.

## 2020-02-01 NOTE — PROGRESS NOTES
[facility], [place]    Medicine Progress Note - Hospitalist Service, Gold 11       Date of Admission:  1/29/2020    Patient is definitely better today than yesterday. His cognition has improved. It is possible that he got his legionella from a water softener in his basement where he makes his coffee. He does not shower but he had a sit down shower where he was on a chair with his right foot wrapped. He hs diabetes and has had this for 30 years. His MRI shows small vessel white matter disease.  He will likely need to go to a TCU and plan for this on Monday. Will wait to see if his mental status improves more by then.  We worked on making adjustmemnts in his insulin to bring down his blood sugars.        Assessment & Plan   This patient is being treated for outpatient pneumonia and has been diagnosed with legionella. He is on a quinolone and being re-assessed. He has not been well for approximately two weeks and was so sick last  week that he was not able to smoke. His history is unreliable in that he is encephalopathic.  There has been improvement in that the patient looks better than previously. He is brighter but still confused. We will have him seen by occupational therapy and physical therapy. He needs to remain on this therapy.   We have switched his medication to a quinolone and he will remain on this for approximately two weeks.    Legionella pneumonia in an elderly white male who is diabetic. He is encephalopathic and needs further testing.   Blood sugars are running high and these need to be adjusted    Diet: Snacks/Supplements Adult: Boost Glucose Control; Between Meals  Moderate Consistent CHO Diet    DVT Prophylaxis: Enoxaparin (Lovenox) SQ  Daniel Catheter: not present  Code Status: Full Code      Disposition Plan   Expected discharge: 4 - 7 days, recommended to prior living arrangement once resolution of his penumonia or improvement enough for him to go home. He will need to  go to a TCU before going home.  Entered: Johny Mccartney MD 02/01/2020, 11:25 AM       The patient's care was discussed with the Patient's Family.    Johny Mccartney MD  Hospitalist Service, [address], [place]  Pager: [phone]  Please see sticky note for cross cover information  ______________________________________________________________________    Interval History   As a historian he is not completely reliable because he has been sick for about two weeks and was not able to smoke last week. He is an active smoker.    Data reviewed today: I reviewed all medications, new labs and imaging results over the last 24 hours. I personally reviewed the chest x-ray image(s) showing the right lower lobe penumonia.    Physical Exam   Vital Signs: Temp: 98.2  F (36.8  C) Temp src: Oral BP: (!) 157/69   Heart Rate: 76 Resp: 24 SpO2: 96 % O2 Device: Nasal cannula Oxygen Delivery: 2 LPM  Weight: 171 lbs 15.34 oz  General Appearance: An older than stated age white male  Respiratory: decreased breath sounds on the right  Cardiovascular: RR  GI: BS soft and non- tender  Skin: intact  Mental status:   cognition has improved.    Data   Recent Labs   Lab 01/31/20  1435 01/31/20  0922 01/30/20  0524  01/29/20  1706   WBC  --  6.3 5.6  --  5.5   HGB  --  9.4* 8.9*  --  9.2*   MCV  --  95 95  --  95   PLT  --  135* 111*  --  117*    135 133   < > 128*   POTASSIUM 3.5 3.0* 3.2*   < > 3.7   CHLORIDE 106 107 104   < > 96   CO2 23 22 22   < > 25   BUN 33* 37* 67*   < > 68*   CR 1.06 1.13 1.89*   < > 1.90*   ANIONGAP 5 6 7   < > 6   CLAUDIA 8.3* 7.9* 7.8*   < > 7.8*   * 226* 226*   < > 388*   ALBUMIN  --   --   --   --  2.0*   PROTTOTAL  --   --   --   --  6.0*   BILITOTAL  --   --   --   --  1.0   ALKPHOS  --   --   --   --  173*   ALT  --   --   --   --  26   AST  --   --   --   --  68*    < > = values in this interval not displayed.

## 2020-02-01 NOTE — PROGRESS NOTES
[facility]    Medicine Progress Note - Hospitalist Service, Gold 11       Date of Admission:  1/29/2020  Assessment & Plan   This patient is being treated for outpatient pneumonia and has been diagnosed with legionella. He is on a quinolone and being re-assessed. He has not been well for approximately two weeks and was so sick last  Week that he was not able to smoke. His history is unreliable in that he is encephalopathic.  There has been improvement in that the patient looks better than previously. He is brighter but still confused. We will have him seen by occupational therapy and physical therapy. He needs to remain on this therapy and I will get an MRI today and then re-assess. I might do an EEG to see if there is any seizure activity and this is contributing to his mental status. We have switched his medication to a quinolone and he will remain on this for approximately two weeks.    Diet: Snacks/Supplements Adult: Boost Glucose Control; Between Meals  Moderate Consistent CHO Diet    DVT Prophylaxis: Enoxaparin (Lovenox) SQ  Daniel Catheter: not present  Code Status: Full Code      Disposition Plan   Expected discharge: 4 - 7 days, recommended to prior living arrangement once resolution of his penumonia or improvement enough for him to go home.  Entered: Johny Mccartney MD 01/31/2020, 7:30 PM       The patient's care was discussed with the Patient's Family.    Johny Mccartney MD  Hospitalist Service, [address]  Pager: [phone]  Please see sticky note for cross cover information  ______________________________________________________________________    Interval History   As a historian he is not completely reliable but he has been sick for about two weeks and was not able to smoke last week. He is an active smoker.    Data reviewed today: I reviewed all medications, new labs and imaging results over the last  24 hours. I personally reviewed the chest x-ray image(s) showing the right lower lobe penumonia.    Physical Exam   Vital Signs: Temp: 99  F (37.2  C) Temp src: Oral BP: (!) 168/74   Heart Rate: 105 Resp: 18 SpO2: 96 % O2 Device: Nasal cannula Oxygen Delivery: 1.5 LPM  Weight: 163 lbs 9.6 oz  General Appearance: An older than stated age white male  Respiratory: decreased breath sounds on the right  Cardiovascular: RR  GI: BS soft and non- tender  Skin: intact  Other: Rectum and perineum no skin lesions    Data   Recent Labs   Lab 01/31/20  1435 01/31/20  0922 01/30/20  0524  01/29/20  1706   WBC  --  6.3 5.6  --  5.5   HGB  --  9.4* 8.9*  --  9.2*   MCV  --  95 95  --  95   PLT  --  135* 111*  --  117*    135 133   < > 128*   POTASSIUM 3.5 3.0* 3.2*   < > 3.7   CHLORIDE 106 107 104   < > 96   CO2 23 22 22   < > 25   BUN 33* 37* 67*   < > 68*   CR 1.06 1.13 1.89*   < > 1.90*   ANIONGAP 5 6 7   < > 6   CLAUDIA 8.3* 7.9* 7.8*   < > 7.8*   * 226* 226*   < > 388*   ALBUMIN  --   --   --   --  2.0*   PROTTOTAL  --   --   --   --  6.0*   BILITOTAL  --   --   --   --  1.0   ALKPHOS  --   --   --   --  173*   ALT  --   --   --   --  26   AST  --   --   --   --  68*    < > = values in this interval not displayed.

## 2020-02-02 LAB
ALP SERPL-CCNC: 260 U/L (ref 40–150)
ANION GAP SERPL CALCULATED.3IONS-SCNC: 6 MMOL/L (ref 3–14)
BASOPHILS # BLD AUTO: 0 10E9/L (ref 0–0.2)
BASOPHILS NFR BLD AUTO: 0 %
BUN SERPL-MCNC: 26 MG/DL (ref 7–30)
CALCIUM SERPL-MCNC: 8.3 MG/DL (ref 8.5–10.1)
CHLORIDE SERPL-SCNC: 106 MMOL/L (ref 94–109)
CO2 SERPL-SCNC: 26 MMOL/L (ref 20–32)
CREAT SERPL-MCNC: 1 MG/DL (ref 0.66–1.25)
DIFFERENTIAL METHOD BLD: ABNORMAL
EOSINOPHIL # BLD AUTO: 0.1 10E9/L (ref 0–0.7)
EOSINOPHIL NFR BLD AUTO: 1.8 %
ERYTHROCYTE [DISTWIDTH] IN BLOOD BY AUTOMATED COUNT: 13.4 % (ref 10–15)
GFR SERPL CREATININE-BSD FRML MDRD: 75 ML/MIN/{1.73_M2}
GLUCOSE BLDC GLUCOMTR-MCNC: 189 MG/DL (ref 70–99)
GLUCOSE BLDC GLUCOMTR-MCNC: 195 MG/DL (ref 70–99)
GLUCOSE BLDC GLUCOMTR-MCNC: 198 MG/DL (ref 70–99)
GLUCOSE BLDC GLUCOMTR-MCNC: 219 MG/DL (ref 70–99)
GLUCOSE BLDC GLUCOMTR-MCNC: 238 MG/DL (ref 70–99)
GLUCOSE SERPL-MCNC: 183 MG/DL (ref 70–99)
HCT VFR BLD AUTO: 27.6 % (ref 40–53)
HGB BLD-MCNC: 9.3 G/DL (ref 13.3–17.7)
LYMPHOCYTES # BLD AUTO: 0.2 10E9/L (ref 0.8–5.3)
LYMPHOCYTES NFR BLD AUTO: 3.5 %
MCH RBC QN AUTO: 32.7 PG (ref 26.5–33)
MCHC RBC AUTO-ENTMCNC: 33.7 G/DL (ref 31.5–36.5)
MCV RBC AUTO: 97 FL (ref 78–100)
MONOCYTES # BLD AUTO: 0.2 10E9/L (ref 0–1.3)
MONOCYTES NFR BLD AUTO: 3.5 %
NEUTROPHILS # BLD AUTO: 4.2 10E9/L (ref 1.6–8.3)
NEUTROPHILS NFR BLD AUTO: 90.3 %
OVALOCYTES BLD QL SMEAR: SLIGHT
PLATELET # BLD AUTO: 156 10E9/L (ref 150–450)
PLATELET # BLD EST: ABNORMAL 10*3/UL
POIKILOCYTOSIS BLD QL SMEAR: SLIGHT
POTASSIUM SERPL-SCNC: 3.7 MMOL/L (ref 3.4–5.3)
PROMYELOCYTES # BLD MANUAL: 0 10E9/L
PROMYELOCYTES NFR BLD MANUAL: 0.9 %
RBC # BLD AUTO: 2.84 10E12/L (ref 4.4–5.9)
SODIUM SERPL-SCNC: 138 MMOL/L (ref 133–144)
WBC # BLD AUTO: 4.7 10E9/L (ref 4–11)

## 2020-02-02 PROCEDURE — 84075 ASSAY ALKALINE PHOSPHATASE: CPT | Performed by: INTERNAL MEDICINE

## 2020-02-02 PROCEDURE — 80048 BASIC METABOLIC PNL TOTAL CA: CPT | Performed by: INTERNAL MEDICINE

## 2020-02-02 PROCEDURE — 00000146 ZZHCL STATISTIC GLUCOSE BY METER IP

## 2020-02-02 PROCEDURE — 25000128 H RX IP 250 OP 636: Performed by: INTERNAL MEDICINE

## 2020-02-02 PROCEDURE — 99233 SBSQ HOSP IP/OBS HIGH 50: CPT | Performed by: INTERNAL MEDICINE

## 2020-02-02 PROCEDURE — 25000132 ZZH RX MED GY IP 250 OP 250 PS 637: Performed by: PHYSICIAN ASSISTANT

## 2020-02-02 PROCEDURE — 85025 COMPLETE CBC W/AUTO DIFF WBC: CPT | Performed by: INTERNAL MEDICINE

## 2020-02-02 PROCEDURE — 25000132 ZZH RX MED GY IP 250 OP 250 PS 637: Performed by: INTERNAL MEDICINE

## 2020-02-02 PROCEDURE — 87081 CULTURE SCREEN ONLY: CPT | Performed by: INTERNAL MEDICINE

## 2020-02-02 PROCEDURE — 25000132 ZZH RX MED GY IP 250 OP 250 PS 637: Performed by: STUDENT IN AN ORGANIZED HEALTH CARE EDUCATION/TRAINING PROGRAM

## 2020-02-02 PROCEDURE — 12000001 ZZH R&B MED SURG/OB UMMC

## 2020-02-02 PROCEDURE — 36415 COLL VENOUS BLD VENIPUNCTURE: CPT | Performed by: INTERNAL MEDICINE

## 2020-02-02 RX ORDER — FUROSEMIDE 10 MG/ML
10 INJECTION INTRAMUSCULAR; INTRAVENOUS ONCE
Status: COMPLETED | OUTPATIENT
Start: 2020-02-02 | End: 2020-02-02

## 2020-02-02 RX ADMIN — FERROUS SULFATE TAB 325 MG (65 MG ELEMENTAL FE) 325 MG: 325 (65 FE) TAB at 07:43

## 2020-02-02 RX ADMIN — SENNOSIDES AND DOCUSATE SODIUM 2 TABLET: 8.6; 5 TABLET ORAL at 07:43

## 2020-02-02 RX ADMIN — ASCORBIC ACID TAB 250 MG 250 MG: 250 TAB at 21:04

## 2020-02-02 RX ADMIN — POLYETHYLENE GLYCOL 3350 17 G: 17 POWDER, FOR SOLUTION ORAL at 07:43

## 2020-02-02 RX ADMIN — FUROSEMIDE 10 MG: 10 INJECTION, SOLUTION INTRAVENOUS at 12:13

## 2020-02-02 RX ADMIN — ASCORBIC ACID TAB 250 MG 250 MG: 250 TAB at 07:48

## 2020-02-02 RX ADMIN — CLOPIDOGREL BISULFATE 75 MG: 75 TABLET, FILM COATED ORAL at 21:04

## 2020-02-02 RX ADMIN — FERROUS SULFATE TAB 325 MG (65 MG ELEMENTAL FE) 325 MG: 325 (65 FE) TAB at 21:03

## 2020-02-02 RX ADMIN — LEVOFLOXACIN 750 MG: 750 TABLET, FILM COATED ORAL at 07:43

## 2020-02-02 RX ADMIN — MELATONIN 1000 UNITS: at 21:03

## 2020-02-02 RX ADMIN — SIMVASTATIN 40 MG: 20 TABLET, FILM COATED ORAL at 21:13

## 2020-02-02 RX ADMIN — ASPIRIN 81 MG CHEWABLE TABLET 81 MG: 81 TABLET CHEWABLE at 07:43

## 2020-02-02 RX ADMIN — MELATONIN 1000 UNITS: at 07:43

## 2020-02-02 RX ADMIN — SENNOSIDES AND DOCUSATE SODIUM 2 TABLET: 8.6; 5 TABLET ORAL at 21:13

## 2020-02-02 ASSESSMENT — ACTIVITIES OF DAILY LIVING (ADL)
ADLS_ACUITY_SCORE: 20

## 2020-02-02 NOTE — PLAN OF CARE
Vitals:    20 1202 20 1459 20 2331 20 0717   BP: (!) 156/66 (!) 155/68 (!) 160/68 (!) 149/64   BP Location: Right arm Right arm Right arm Right arm   Pulse: 82 80 85 80   Resp:    Temp: 98.6  F (37  C) 97.7  F (36.5  C) 98.3  F (36.8  C) 98.5  F (36.9  C)   TempSrc: Oral Oral Oral Oral   SpO2: 99% 98% 99% 96%   Weight:       Height:           Neuro: alert and oriented     VS: remain hypertensive, sating 96% on 2 lit O2, non labor breathing     B- sliding scale and carb coverage given    Pain/Nausea: denies pian or nausea, tolerating intake.    Lines/Drains:PIV at 75 cc    Incision/Wound: scab on both bilateral lower extremities     GI/: voiding adequate, audible BS passing gas     Diet/Appetite: good appetite    Activity: up and ambulated with one assist and walker     Plan: ID called stated that Legionella culture  from other source is an acceptable,  continue with plan of care, possible TCU.

## 2020-02-02 NOTE — PLAN OF CARE
"BP (!) 160/68 (BP Location: Right arm)   Pulse 85   Temp 98.3  F (36.8  C) (Oral)   Resp 22   Ht 1.88 m (6' 2\")   Wt 78 kg (171 lb 15.3 oz)   SpO2 99%   BMI 22.08 kg/m      NEURO: A&O x4, neuros intact, no new deficits noted.   RESPIRATORY: On 2L of NC @ 99%. No SOB or KHALIL reported.   CARDIAC: Hypertensive but does not meet order parameters. Otherwise no chest pain reported.   GI/: +BS, +flatus, voiding via urinal, last BM 1/31/20  DIET: consistent carb/diabetic  PAIN/NAUSEA: None reported this shift.   IV ACCESS: PIV w/ LR @75mL/h  ACTIVITY: Assist of 1 w/ walker.  LAB: 0200   PLAN: Possible discharge to TCU on Monday    "

## 2020-02-03 ENCOUNTER — APPOINTMENT (OUTPATIENT)
Dept: OCCUPATIONAL THERAPY | Facility: CLINIC | Age: 75
DRG: 871 | End: 2020-02-03
Payer: COMMERCIAL

## 2020-02-03 LAB
ANION GAP SERPL CALCULATED.3IONS-SCNC: 8 MMOL/L (ref 3–14)
BUN SERPL-MCNC: 38 MG/DL (ref 7–30)
CALCIUM SERPL-MCNC: 7.1 MG/DL (ref 8.5–10.1)
CHLORIDE SERPL-SCNC: 110 MMOL/L (ref 94–109)
CO2 SERPL-SCNC: 25 MMOL/L (ref 20–32)
CREAT SERPL-MCNC: 1.57 MG/DL (ref 0.66–1.25)
GFR SERPL CREATININE-BSD FRML MDRD: 43 ML/MIN/{1.73_M2}
GLUCOSE BLDC GLUCOMTR-MCNC: 135 MG/DL (ref 70–99)
GLUCOSE BLDC GLUCOMTR-MCNC: 152 MG/DL (ref 70–99)
GLUCOSE BLDC GLUCOMTR-MCNC: 207 MG/DL (ref 70–99)
GLUCOSE BLDC GLUCOMTR-MCNC: 228 MG/DL (ref 70–99)
GLUCOSE SERPL-MCNC: 178 MG/DL (ref 70–99)
POTASSIUM SERPL-SCNC: 5.4 MMOL/L (ref 3.4–5.3)
SODIUM SERPL-SCNC: 143 MMOL/L (ref 133–144)

## 2020-02-03 PROCEDURE — 12000001 ZZH R&B MED SURG/OB UMMC

## 2020-02-03 PROCEDURE — 25000132 ZZH RX MED GY IP 250 OP 250 PS 637: Performed by: STUDENT IN AN ORGANIZED HEALTH CARE EDUCATION/TRAINING PROGRAM

## 2020-02-03 PROCEDURE — 00000146 ZZHCL STATISTIC GLUCOSE BY METER IP

## 2020-02-03 PROCEDURE — 80048 BASIC METABOLIC PNL TOTAL CA: CPT | Performed by: INTERNAL MEDICINE

## 2020-02-03 PROCEDURE — 99233 SBSQ HOSP IP/OBS HIGH 50: CPT | Performed by: INTERNAL MEDICINE

## 2020-02-03 PROCEDURE — 36415 COLL VENOUS BLD VENIPUNCTURE: CPT | Performed by: INTERNAL MEDICINE

## 2020-02-03 PROCEDURE — 97535 SELF CARE MNGMENT TRAINING: CPT | Mod: GO | Performed by: OCCUPATIONAL THERAPIST

## 2020-02-03 PROCEDURE — 25000132 ZZH RX MED GY IP 250 OP 250 PS 637: Performed by: INTERNAL MEDICINE

## 2020-02-03 RX ADMIN — MELATONIN 1000 UNITS: at 08:39

## 2020-02-03 RX ADMIN — ASPIRIN 81 MG CHEWABLE TABLET 81 MG: 81 TABLET CHEWABLE at 08:39

## 2020-02-03 RX ADMIN — ASCORBIC ACID TAB 250 MG 250 MG: 250 TAB at 08:39

## 2020-02-03 RX ADMIN — MELATONIN 1000 UNITS: at 19:50

## 2020-02-03 RX ADMIN — LEVOFLOXACIN 750 MG: 750 TABLET, FILM COATED ORAL at 08:39

## 2020-02-03 RX ADMIN — SENNOSIDES AND DOCUSATE SODIUM 1 TABLET: 8.6; 5 TABLET ORAL at 08:39

## 2020-02-03 RX ADMIN — SENNOSIDES AND DOCUSATE SODIUM 1 TABLET: 8.6; 5 TABLET ORAL at 19:50

## 2020-02-03 RX ADMIN — CLOPIDOGREL BISULFATE 75 MG: 75 TABLET, FILM COATED ORAL at 19:50

## 2020-02-03 RX ADMIN — FERROUS SULFATE TAB 325 MG (65 MG ELEMENTAL FE) 325 MG: 325 (65 FE) TAB at 08:39

## 2020-02-03 RX ADMIN — ASCORBIC ACID TAB 250 MG 250 MG: 250 TAB at 19:50

## 2020-02-03 RX ADMIN — FERROUS SULFATE TAB 325 MG (65 MG ELEMENTAL FE) 325 MG: 325 (65 FE) TAB at 19:50

## 2020-02-03 RX ADMIN — SIMVASTATIN 40 MG: 20 TABLET, FILM COATED ORAL at 22:11

## 2020-02-03 ASSESSMENT — ACTIVITIES OF DAILY LIVING (ADL)
ADLS_ACUITY_SCORE: 20

## 2020-02-03 NOTE — PLAN OF CARE
"BP (!) 142/63 (BP Location: Right arm)   Pulse 79   Temp 99.3  F (37.4  C) (Oral)   Resp 20   Ht 1.88 m (6' 2\")   Wt 78 kg (171 lb 15.3 oz)   SpO2 100%   BMI 22.08 kg/m      NEURO: A&O x4, neuros intact, no new deficits noted.   RESPIRATORY: On 2L of NC satting @ 100%. No SOB or KHALIL reported.   CARDIAC: Hypertensive but better than previous night; BP below notification parameters. Otherwise no chest pain reported.   GI/: +BS, +flatus, voiding via urinal w/o difficulty.   DIET: consistent carb/diabetic  PAIN/NAUSEA: None reported this shift.   IV ACCESS: PIV SL.  ACTIVITY: Assist of 1 w/ walker.  LAB: 0200   PLAN: Possible discharge to TCU today.  "

## 2020-02-03 NOTE — PLAN OF CARE
"Discharge Planner OT   Patient plan for discharge: TCU  Current status: pt participated in sit to stand practice with min A. Completed toilet transfer with min A. Ambulated with FWW and CGA, on RA, was SOB and 02 sats 90%. Resumed back to 97% with PLB education and place back on nasal canula  Barriers to return to prior living situation: increased level of assist needed with ADL\"s, decreased activity tolerance and LE strength  Recommendations for discharge: TCU  Rationale for recommendations: pt is below baseline and would benefit from continued daily therapy in order to maximize I and return to PLOF       Entered by: Apple Tejeda 02/03/2020 4:03 PM       "

## 2020-02-03 NOTE — PROGRESS NOTES
[facility], [place]    Medicine Progress Note - Hospitalist Service, Gold 11       Patient improved again today but he will need transition to a TCU. His wife brought in the water from the cooler in the basement and this was sent for culture for Legionella. He will need to get up and walk with physical therapy. His sugars remain a problem and will need to adjust the insulin again.  I raised his lantus at bedtime and will adjust the sliding scale in the AM if there is no improvment    Date of Admission:  1/29/2020    Yesterday Feb 1,2020.  Patient is definitely better today than yesterday. His cognition has improved. It is possible that he got his legionella from a water softener in his basement where he makes his coffee. He does not shower but he had a sit down shower where he was on a chair with his right foot wrapped. He hs diabetes and has had this for 30 years. His MRI shows small vessel white matter disease.  He will likely need to go to a TCU and plan for this on Monday. Will wait to see if his mental status improves more by then.  We worked on making adjustmemnts in his insulin to bring down his blood sugars.        Assessment & Plan   This patient is being treated for outpatient pneumonia and has been diagnosed with legionella. He is on a quinolone and being re-assessed. He has not been well for approximately two weeks and was so sick last  week that he was not able to smoke. His history is unreliable in that he is encephalopathic.  There has been improvement in that the patient looks better than previously. He is brighter but still confused. We will have him seen by occupational therapy and physical therapy. He needs to remain on this therapy.   We have switched his medication to a quinolone and he will remain on this for approximately two weeks.    Legionella pneumonia in an elderly white male who is diabetic. He is encephalopathic and needs further testing.   Blood sugars are  running high and these need to be adjusted    Diet: Snacks/Supplements Adult: Boost Glucose Control; Between Meals  Moderate Consistent CHO Diet    DVT Prophylaxis: Enoxaparin (Lovenox) SQ  Daniel Catheter: not present  Code Status: Full Code      Disposition Plan   Expected discharge: 4 - 7 days, recommended to prior living arrangement once resolution of his penumonia or improvement enough for him to go home. He will need to go to a TCU before going home.  Entered: Johny Mccartney MD 02/02/2020, 9:06 PM       The patient's care was discussed with the Patient's Family.    Johny Mccartney MD  Hospitalist Service, [address], [place]  Pager: [phone]  Please see sticky note for cross cover information  ______________________________________________________________________    Interval History   As a historian he is not completely reliable because he has been sick for about two weeks and was not able to smoke last week. He is an active smoker.    Data reviewed today: I reviewed all medications, new labs and imaging results over the last 24 hours. I personally reviewed the chest x-ray image(s) showing the right lower lobe penumonia.    Physical Exam   Vital Signs: Temp: 98.4  F (36.9  C) Temp src: Oral BP: (!) 146/67 Pulse: 83   Resp: 20 SpO2: 99 % O2 Device: None (Room air) Oxygen Delivery: 2 LPM  Weight: 171 lbs 15.34 oz  General Appearance: An older than stated age white male  Respiratory: decreased breath sounds on the right  Cardiovascular: RR  GI: BS soft and non- tender  Skin: intact  Mental status:   cognition has improved.      Data   Recent Labs   Lab 02/02/20  0726 01/31/20  1435 01/31/20  0922 01/30/20  0524  01/29/20  1706   WBC 4.7  --  6.3 5.6  --  5.5   HGB 9.3*  --  9.4* 8.9*  --  9.2*   MCV 97  --  95 95  --  95     --  135* 111*  --  117*    135 135 133   < > 128*   POTASSIUM 3.7 3.5 3.0* 3.2*   < > 3.7   CHLORIDE 106 106 107 104   <  > 96   CO2 26 23 22 22   < > 25   BUN 26 33* 37* 67*   < > 68*   CR 1.00 1.06 1.13 1.89*   < > 1.90*   ANIONGAP 6 5 6 7   < > 6   CLAUDIA 8.3* 8.3* 7.9* 7.8*   < > 7.8*   * 239* 226* 226*   < > 388*   ALBUMIN  --   --   --   --   --  2.0*   PROTTOTAL  --   --   --   --   --  6.0*   BILITOTAL  --   --   --   --   --  1.0   ALKPHOS 260*  --   --   --   --  173*   ALT  --   --   --   --   --  26   AST  --   --   --   --   --  68*    < > = values in this interval not displayed.

## 2020-02-03 NOTE — PLAN OF CARE
Vitals: Temp: 97.8  F (36.6  C) Temp src: Oral BP: (!) 147/49 Pulse: (!) 43 Heart Rate: 77 Resp: 20 SpO2: 97 % O2 Device: None (Room air) Oxygen Delivery: 2 LPM    Time: 1667-3463  Reason for admission: Community acquired pneumonia, legionella.   Activity:  Assist x1 with walker  Pain:  Pt denies pain and declines pain medication.   Neuro: A&O x4, able to make needs known. [place] - give proper time to respond.    Cardiac: ex, bradycardic and intermittent HTN. Team aware.   Respiratory:  ex, SOB on exertion pt on 2L NC at baseline. Frequent dry nonproductive cough. LS diminished.   GI/: +BS, +Flatus, +BM. Voiding adequately in urinal at bedside.    Diet: Consistent carbohydrate diet/diabetic.   Lines:  PIV SL.   Incisions/Drains/Skin: Scab on bilateral lower extremities, brace on R foot c/d/i.   Lab:   before breakfast, 228 at lunch. Coverage given per MAR.   New changes this shift: Possible discharge to TCU pending insurance approval. Endocrine consulted today - continue to monitor blood sugars.      Continue to monitor and follow POC

## 2020-02-03 NOTE — PROGRESS NOTES
[facility], [place]    Medicine Progress Note - Hospitalist Service, Gold 11     Feb 3    Patient improved again today but he will need transition to a TCU. Evaluations are on going for OT and PT to support moving the patient to the TCU and further evaluating him. He has resolving RLL pneumonia. Organism is legionella. He is brighter each day. We have been working on getting his blood sugars down and have asked endocrine to see him. Adjustments in his insulin were made. He has a primary care physician as an outpatient and he will see him. He will need a pulmonary evaluation for his COPD and re-assess. The microbiology lab here and the [facility] lab will not culture the water from the house unless there is a cluster of disease. The wife has a referral number for MD. I think that we need to get his blood sugars under better control and then re-assess his mental/cognitive capabilities. At home with his Charcot foot he goes down 29 steps to the basement.      Feb2  Yesterday is wife brought in the water from the cooler in the basement and this was sent for culture for Legionella. He will need to get up and walk with physical therapy. His sugars remain a problem and will need to adjust the insulin again.  I raised his lantus at bedtime and will adjust the sliding scale in the AM if there is no improvment    Date of Admission:  1/29/2020    Yesterday Feb 1,2020.  Patient is definitely better today than yesterday. His cognition has improved. It is possible that he got his legionella from a water softener in his basement where he makes his coffee. He does not shower but he had a sit down shower where he was on a chair with his right foot wrapped. He hs diabetes and has had this for 30 years. His MRI shows small vessel white matter disease.  He will likely need to go to a TCU and plan for this on Monday. Will wait to see if his mental status improves more by then.  We worked on making  adjustmemnts in his insulin to bring down his blood sugars.        Assessment & Plan   This patient is being treated for outpatient pneumonia and has been diagnosed with legionella. He is on a quinolone and being re-assessed. He has not been well for approximately two weeks and was so sick last  week that he was not able to smoke. His history is unreliable in that he is encephalopathic.  There has been improvement in that the patient looks better than previously. He is brighter but still confused. We will have him seen by occupational therapy and physical therapy. He needs to remain on this therapy.   We have switched his medication to a quinolone and he will remain on this for approximately two weeks.    Legionella pneumonia in an elderly white male who is diabetic. He is encephalopathic and needs further testing.   Blood sugars are running high and these need to be adjusted    Diet: Snacks/Supplements Adult: Boost Glucose Control; Between Meals  Moderate Consistent CHO Diet    DVT Prophylaxis: Enoxaparin (Lovenox) SQ  Daniel Catheter: not present  Code Status: Full Code      Disposition Plan   Expected discharge: 4 - 7 days, recommended to prior living arrangement once resolution of his penumonia or improvement enough for him to go home. He will need to go to a TCU before going home.  Entered: Johny Mccartney MD 02/03/2020, 5:52 PM       The patient's care was discussed with the Patient's Family/wife    Johny Mccartney MD  Hospitalist Service, [address], [place]  Pager: [phone]  Please see sticky note for cross cover information  ______________________________________________________________________    Interval History   As a historian he is not completely reliable because he has been sick for about two weeks and was not able to smoke last week. He is an active smoker.    Data reviewed today: I reviewed all medications, new labs and imaging results over  the last 24 hours. I personally reviewed the chest x-ray image(s) showing the right lower lobe penumonia.    Physical Exam   Vital Signs: Temp: 96.1  F (35.6  C) Temp src: Oral BP: 126/46 Pulse: (!) 43 Heart Rate: 77 Resp: 20 SpO2: 97 % O2 Device: None (Room air) Oxygen Delivery: 2 LPM  Weight: 171 lbs 15.34 oz  General Appearance: An older than stated age white male  Respiratory: decreased breath sounds on the right  Cardiovascular: RR  GI: BS soft and non- tender  Skin: intact  Mental status:   cognition has improved.  Extremities:  RLL placed in Boot for Charcot. There is an area of wound to the foot that is from Charcot and probably an inappropriate support.    Data   Recent Labs   Lab 02/02/20  0726 01/31/20  1435 01/31/20  0922 01/30/20  0524  01/29/20  1706   WBC 4.7  --  6.3 5.6  --  5.5   HGB 9.3*  --  9.4* 8.9*  --  9.2*   MCV 97  --  95 95  --  95     --  135* 111*  --  117*    135 135 133   < > 128*   POTASSIUM 3.7 3.5 3.0* 3.2*   < > 3.7   CHLORIDE 106 106 107 104   < > 96   CO2 26 23 22 22   < > 25   BUN 26 33* 37* 67*   < > 68*   CR 1.00 1.06 1.13 1.89*   < > 1.90*   ANIONGAP 6 5 6 7   < > 6   CLAUDIA 8.3* 8.3* 7.9* 7.8*   < > 7.8*   * 239* 226* 226*   < > 388*   ALBUMIN  --   --   --   --   --  2.0*   PROTTOTAL  --   --   --   --   --  6.0*   BILITOTAL  --   --   --   --   --  1.0   ALKPHOS 260*  --   --   --   --  173*   ALT  --   --   --   --   --  26   AST  --   --   --   --   --  68*    < > = values in this interval not displayed.

## 2020-02-03 NOTE — CONSULTS
Diabetes/Hyperglycemia Management Consult    Chief Complaint recs for diabetes management, preparing for TCU at discharge  Consult requested by: Johny Mccartney MD  History of Present Illness Amos Walker is a 74 year old male with past medical hx significant for chronic tobacco use, CKD, CAD, PAD and type 2 diabetes complicated by Charcot joint, admitted 1/29/2020 with fever, fatigue, cough, weight loss.  He has been treated for legionella pneumonia, taking levofloxacin 750 mg daily.  His creatinine has improved to 1.9 to 1.0.    He was encephalopathic, but has been improving.  His oral intake was poor prior to admission for one week.  Weight loss has occurred over the last couple months.  Appetite remains low and hospital food not appetizing.  Does okay with the supplements.  Working with OT and PT today and hopeful for discharge to TCU tomorrow.    Glucose on admission was quite high and remained high 200s using half dose home glargine.  Glargine increase and addition prandial aspart 1 per 15 grams carb  maintained lgucose around 200.  Best improvement with glargine increase to 10 units last night.  Boost intake has not been covered with aspart.  Pt's dulaglutide was due on 1/27 but did not take it since was feeling poorly.  Does not recall having nausea with start of dulaglutide about one year ago, and can't recall timing of dose increase.  At home, he watches his Freestyle Danny CGMS closely and withholds food or delays eating if glucose is elevated.  He does not have a correction scale aspart.      Recent Labs   Lab 02/03/20  1242 02/03/20  0754 02/03/20  0221 02/02/20  2148 02/02/20  1748 02/02/20  1145 02/02/20  0726  01/31/20  1435  01/31/20  0922  01/30/20  0524  01/30/20  0104  01/29/20  1706   GLC  --   --   --   --   --   --  183*  --  239*  --  226*  --  226*  --  262*  --  388*   * 135* 152* 219* 198* 195*  --    < >  --    < >  --    < >  --    < >  --    < >  --     < > = values in  "this interval not displayed.         Diabetes Type: type 2  Diabetes Duration: 30+years  Usual Diabetes Regimen:   3x/day BG monitoring frequency  Breakfast usually at noon, then one or two mor meals.  Wife has been cooking lately, while pt not bearing weight on foot-  Usually 60 grams carb/meal    Limited activity r/t chronic foot ulcer    Lantus is 6 units (was 8) in morning  Humalog is 4 units for bfast, 3 units for lunch and supper  Trulicity 1.5 mg weekly on Mondays (was Saturdays before that)  Ability to [place] Prescribed Regimen: fair, improved in recent years  Diabetes Control:     Component      Latest Ref Rng & Units 3/27/2019 10/4/2019 12/20/2019   Hemoglobin A1C      0 - 5.6 % 6.7 (H) 5.6 5.8 (A)   Estimated average glucose for A1C 5.8 = 120  Amos reports average BG from CGMS is \"less than 150\"  Lab Results   Component Value Date    A1C 5.8 12/20/2019    A1C 5.6 10/04/2019    A1C 6.7 03/27/2019    A1C 7.2 11/16/2018    A1C 6.6 06/21/2018     Diabetes Complications: Mild nonproliferative diabetic retinopathy of both eyes without macular edema (last exam 11/2019), peripheral neuropathy, charcoth joint nephropathy, CAD, concern for white matter changes  History of DKA: no  Able to Detect Hypoglycemia: yes, will be symptomatic.  Uncommon occurrence per pt  Usual Diabetes Care Provider: Sees Dr. Swift for diabetes management:  12/20/2019  Diabetes mellitus: His fasting sugars range 103-117 in the morning (around 10AM) with readings of ~120 2 hours post-prandial. He had a low of 63 overnight which he woke up to and corrected with food. He has some lows in the evening, his current dinner dose of Humalog is 3 unit(s). Because he is having hyperglycemia 4 hours after eating he would like to consider increasing his Lantus, currently at 8 unit(s), and decreasing Humalog. He continues Trulicity 1.5 mg weekly. [day of week]  [Freestyle Danny]  \"A1c was 5.8 today. I am concerned that he is a bit too tightly " "controlled. I advised him to crosscheck his CGM with his regular glucometer, I cautioned him against increasing his insulin. I would like him to reduce lantus to 6 units today.    - Hemoglobin A1c POCT\"    Has also seen diabetes education, RN and RD in last year-  Reported he ignored his diabetes for many years    Factors Impacting Glucose Control: infection, fluoroquinolone, decreased appetite, omitted prandial aspart      Review of Systems  10 point ROS completed with pertinent positives and negatives noted in the HPI    Past medical, family and social histories are reviewed and updated.    Past Medical History  Past Medical History:   Diagnosis Date     Anemia      CAD (coronary artery disease)     2V CAD involving LAD and RCA, s/p DESx4 in 3/18     CKD (chronic kidney disease) stage 3, GFR 30-59 ml/min (H)      Colon polyp      Emphysema of lung (H)     noted on CT     Heart disease      HTN (hypertension)      Hyperlipidemia      MRSA cellulitis of right foot     in past.      PAD (peripheral artery disease) (H) 09/2018    s/p R femoral enarterectomy and stenting      Tobacco use     50+ pack     Type 2 diabetes mellitus (H)     for 25 yrs.  on insulin and starlix     Venous ulcer (H)        Family History  Family History   Problem Relation Age of Onset     Cancer Father         colon     Kidney Disease Father      Kidney Disease Mother      Cardiovascular Son         MI in 40s     Macular Degeneration Brother      Glaucoma No family hx of      Melanoma No family hx of      Skin Cancer No family hx of        Social History  Social History     Socioeconomic History     Marital status:      Spouse name: Not on file     Number of children: Not on file     Years of education: Not on file     Highest education level: Not on file   Occupational History     Not on file   Social Needs     Financial resource strain: Not on file     Food insecurity:     Worry: Not on file     Inability: Not on file     " Transportation needs:     Medical: Not on file     Non-medical: Not on file   Tobacco Use     Smoking status: Current Every Day Smoker     Packs/day: 0.50     Years: 50.00     Pack years: 25.00     Types: Cigarettes     Smokeless tobacco: Never Used     Tobacco comment: heavier smoker in the past   Substance and Sexual Activity     Alcohol use: No     Drug use: No     Sexual activity: Not on file   Lifestyle     Physical activity:     Days per week: Not on file     Minutes per session: Not on file     Stress: Not on file   Relationships     Social connections:     Talks on phone: Not on file     Gets together: Not on file     Attends Religion service: Not on file     Active member of club or organization: Not on file     Attends meetings of clubs or organizations: Not on file     Relationship status: Not on file     Intimate partner violence:     Fear of current or ex partner: Not on file     Emotionally abused: Not on file     Physically abused: Not on file     Forced sexual activity: Not on file   Other Topics Concern     Parent/sibling w/ CABG, MI or angioplasty before 65F 55M? Not Asked   Social History Narrative     Not on file         Physical Exam  Temp: 97.8  F (36.6  C) Temp src: Oral BP: (!) 147/49 Pulse: (!) 43 Heart Rate: 77 Resp: 20 SpO2: 97 % O2 Device: None (Room air) Oxygen Delivery: 2 LPM  Body mass index is 22.08 kg/m .  Wt Readings from Last 4 Encounters:   01/31/20 78 kg (171 lb 15.3 oz)   11/04/19 69.9 kg (154 lb)   10/31/19 70.8 kg (156 lb)   10/21/19 69.9 kg (154 lb)       General: thin man resting in bed, in no distress. Wife at bedside, involved n care  HEENT: NC/AT, PER and anicteric, non-injected, oral mucous membranes moist. Very hearing impaired, despite aids in place  Lungs: unlabored respiration, no cough observed.  Supp O2 via NC  ABD: rounded  Skin: warm and dry, no obvious lesions  MSK:  fluid movement of all extremities  Lymp:  no LE edema   Mental status:  alert, oriented x3,  communicating clearly  Psych:  calm, even mood    Laboratory  Recent Labs   Lab Test 02/02/20  0726 01/31/20  1435    135   POTASSIUM 3.7 3.5   CHLORIDE 106 106   CO2 26 23   ANIONGAP 6 5   * 239*   BUN 26 33*   CR 1.00 1.06   CLAUDIA 8.3* 8.3*     CBC RESULTS:   Recent Labs   Lab Test 02/02/20  0726   WBC 4.7   RBC 2.84*   HGB 9.3*   HCT 27.6*   MCV 97   MCH 32.7   MCHC 33.7   RDW 13.4          Liver Function Studies -   Recent Labs   Lab Test 02/02/20  0726 01/29/20  1706   PROTTOTAL  --  6.0*   ALBUMIN  --  2.0*   BILITOTAL  --  1.0   ALKPHOS 260* 173*   AST  --  68*   ALT  --  26       Active Medications  Current Facility-Administered Medications   Medication     acetaminophen (TYLENOL) tablet 650 mg     ammonium lactate (AMLACTIN) 12 % cream     aspirin (ASA) chewable tablet 81 mg     clopidogrel (PLAVIX) tablet 75 mg     glucose gel 15-30 g    Or     dextrose 50 % injection 25-50 mL    Or     glucagon injection 1 mg     ferrous sulfate (FEROSUL) tablet 325 mg    And     vitamin C (ASCORBIC ACID) tablet 250 mg     insulin aspart (NovoLOG) inj (RAPID ACTING)     insulin aspart (NovoLOG) inj (RAPID ACTING)     [START ON 2/4/2020] insulin aspart (NovoLOG) inj (RAPID ACTING)     insulin aspart (NovoLOG) inj (RAPID ACTING)     insulin aspart (NovoLOG) inj (RAPID ACTING)     insulin glargine (LANTUS PEN) injection 10 Units     levofloxacin (LEVAQUIN) tablet 750 mg     lidocaine (LMX4) cream     lidocaine 1 % 0.1-1 mL     melatonin tablet 1 mg     naloxone (NARCAN) injection 0.1-0.4 mg     polyethylene glycol (MIRALAX/GLYCOLAX) Packet 17 g     potassium chloride (KLOR-CON) Packet 20-40 mEq     potassium chloride 10 mEq in 100 mL intermittent infusion with 10 mg lidocaine     potassium chloride 10 mEq in 100 mL sterile water intermittent infusion (premix)     potassium chloride 20 mEq in 50 mL intermittent infusion     potassium chloride ER (K-DUR/KLOR-CON M) CR tablet 20-40 mEq     senna-docusate  (SENOKOT-S/PERICOLACE) 8.6-50 MG per tablet 1 tablet    Or     senna-docusate (SENOKOT-S/PERICOLACE) 8.6-50 MG per tablet 2 tablet     simvastatin (ZOCOR) tablet 40 mg     sodium chloride (PF) 0.9% PF flush 3 mL     sodium chloride (PF) 0.9% PF flush 3 mL     sodium chloride (PF) 0.9% PF flush 3 mL     sodium chloride (PF) 0.9% PF flush 3 mL     Vitamin D3 (CHOLECALCIFEROL) 25 mcg (1000 units) tablet 1,000 Units       Current Diet  Orders Placed This Encounter      Moderate Consistent CHO Diet        Assessment  Amos Walekr is a 74 year old male with past medical hx significant for chronic tobacco use, CKD, CAD, PAD and type 2 diabetes complicated by Charcot joint, admitted 1/29/2020 with fever, fatigue, cough, weight loss, being treated for legionella pneumonia (levofloxacin 750 mg daily) experiencing persistent post-prandial hyperglycemia.  Acute care glucose target is typically 140-180 mg/dL and this is appropriate for Amos at this time.  With improved overall state of health, tighter glucose control could be appropriate.  But, agree with Dr. Swift's concern that his A1C may be reflective of too much hypoglycemia.    Plan    - increase prandial aspart for breakfast to 1 unit per 10 grams carbohydrate  - add aspart 1 per 15 grams carb PRN for snacks/supps between meals  - continue aspart 1 per 15 grams carb for lunch and supper  (will suggest fixed meal dosing for TCU tomorrow)  - continue aspart medium resistance correction qAC, HS  - continue glargine 10 units at HS  - continue to hold dulaglutide (Trulicity) 1.5 mg weekly (usual admin day Mondays).  Potential for restart at U, if appetite has improved.  -BG monitor qAC, HS 0200  - follow up Dr. Swift, including Danny Winn APRN -[phone]    Diabetes Management Team job code: [id number]    I spent a total of 80 minutes bedside and on the inpatient unit managing the glycemic care of Amos Walker. Over 50% of my time on the unit was  spent counseling the patient and wife and/or coordinating care regarding home diabetes patterns and acute glucsoe management, planning for discharge.  See note for details.

## 2020-02-03 NOTE — PROVIDER NOTIFICATION
Provider [id number] notified of pt HR 43. Provider responded timely and said it is okay and to cont to monitor pt closely.     Provider also notified that infectious disease team not being able to test water sample from home and provider is going to talk to the microlab.     Leatha Syed RN on 2/3/2020 at 8:04 AM

## 2020-02-03 NOTE — PROGRESS NOTES
Social Work Services Progress Note    Hospital Day: 6  Date of Initial Social Work Evaluation:  2/1/20- please see for details  Collaborated with:  Chart review, team rounds, Dr. Mccartney, nursing facilities, pt     Data:  Pt is a 74 year old male being followed by SOM for TCU placement after being admitted with pneumonia.     Per Dr. Mccartney pt is medically ready for discharge, will be on oral abx, and does need oxygen at discharge. SW was later informed by bedside nursing that pt is not medically ready for discharge today and Endocrine has been consulted. Pt is now anticipated to be ready for discharge Tuesday.     Received vm from Edna in admissions at [facility], pt's first choice TCU (p. [phone]) reporting that pt is clinically accepted and pt needs to be informed that facility is a non smoking facility.     Intervention:  SOM met with pt in pt's room to introduce self, check in, and provide update. Pt was getting back to bed after shaving and welcoming of visit. SOM provided update that pt is medically ready for discharge, and explained acceptance at [facility] as long as pt is ok with not smoking there, and if facility received insurance approval. Pt reported he doesn't smoke anymore and doesn't plan to and stated it's either smoking or breathing.   SOM explained that [facility] will be updated so they can pursue insurance approval and SOM will keep pt updated.     Pt denied having further SW needs or questions at this time.     SOM spoke with Edna in admissions at [facility]- she reported there is a bed available, pt is clinically accepted, facility does take pt's [employer] insurance, and she will start insurance approval once it is confirmed if pt wants the bed and is ok with not smoking there. After meeting with pt SOM called Edna and left carlos informing her that pt does not plan to smoke and asked that insurance approval be started, waiting to hear back.     PAS  completed in anticipation of discharge- confirmation code: [id number].    Assessment:  See bedside RN, PT/OT, medical team notes    Plan:    Anticipated Disposition:  Facility:  [facility]    Barriers to d/c plan:  Waiting for insurance approval, medical stability    Follow Up:  SOM [place] will continue to follow    BRENDON Mckeon, LICSW  7B   [phone] (pager) [phone]  2/3/2020

## 2020-02-04 ENCOUNTER — APPOINTMENT (OUTPATIENT)
Dept: PHYSICAL THERAPY | Facility: CLINIC | Age: 75
DRG: 871 | End: 2020-02-04
Payer: COMMERCIAL

## 2020-02-04 ENCOUNTER — APPOINTMENT (OUTPATIENT)
Dept: OCCUPATIONAL THERAPY | Facility: CLINIC | Age: 75
DRG: 871 | End: 2020-02-04
Payer: COMMERCIAL

## 2020-02-04 LAB
ALBUMIN SERPL-MCNC: 1.5 G/DL (ref 3.4–5)
ALP SERPL-CCNC: 308 U/L (ref 40–150)
ALT SERPL W P-5'-P-CCNC: 22 U/L (ref 0–70)
ANION GAP SERPL CALCULATED.3IONS-SCNC: 5 MMOL/L (ref 3–14)
AST SERPL W P-5'-P-CCNC: 55 U/L (ref 0–45)
BACTERIA SPEC CULT: NO GROWTH
BASOPHILS # BLD AUTO: 0 10E9/L (ref 0–0.2)
BASOPHILS NFR BLD AUTO: 0 %
BILIRUB SERPL-MCNC: 0.6 MG/DL (ref 0.2–1.3)
BUN SERPL-MCNC: 33 MG/DL (ref 7–30)
CALCIUM SERPL-MCNC: 7.8 MG/DL (ref 8.5–10.1)
CHLORIDE SERPL-SCNC: 106 MMOL/L (ref 94–109)
CO2 SERPL-SCNC: 28 MMOL/L (ref 20–32)
CREAT SERPL-MCNC: 1.08 MG/DL (ref 0.66–1.25)
DIFFERENTIAL METHOD BLD: ABNORMAL
EOSINOPHIL # BLD AUTO: 0.1 10E9/L (ref 0–0.7)
EOSINOPHIL NFR BLD AUTO: 0.9 %
ERYTHROCYTE [DISTWIDTH] IN BLOOD BY AUTOMATED COUNT: 13.2 % (ref 10–15)
GFR SERPL CREATININE-BSD FRML MDRD: 68 ML/MIN/{1.73_M2}
GLUCOSE BLDC GLUCOMTR-MCNC: 100 MG/DL (ref 70–99)
GLUCOSE BLDC GLUCOMTR-MCNC: 103 MG/DL (ref 70–99)
GLUCOSE BLDC GLUCOMTR-MCNC: 117 MG/DL (ref 70–99)
GLUCOSE BLDC GLUCOMTR-MCNC: 90 MG/DL (ref 70–99)
GLUCOSE SERPL-MCNC: 120 MG/DL (ref 70–99)
HCT VFR BLD AUTO: 27 % (ref 40–53)
HGB BLD-MCNC: 8.8 G/DL (ref 13.3–17.7)
LYMPHOCYTES # BLD AUTO: 0.4 10E9/L (ref 0.8–5.3)
LYMPHOCYTES NFR BLD AUTO: 5.3 %
MCH RBC QN AUTO: 31.7 PG (ref 26.5–33)
MCHC RBC AUTO-ENTMCNC: 32.6 G/DL (ref 31.5–36.5)
MCV RBC AUTO: 97 FL (ref 78–100)
MONOCYTES # BLD AUTO: 0.2 10E9/L (ref 0–1.3)
MONOCYTES NFR BLD AUTO: 2.7 %
NEUTROPHILS # BLD AUTO: 6.3 10E9/L (ref 1.6–8.3)
NEUTROPHILS NFR BLD AUTO: 91.1 %
OVALOCYTES BLD QL SMEAR: SLIGHT
PLATELET # BLD AUTO: 176 10E9/L (ref 150–450)
PLATELET # BLD EST: ABNORMAL 10*3/UL
POIKILOCYTOSIS BLD QL SMEAR: SLIGHT
POTASSIUM SERPL-SCNC: 3.5 MMOL/L (ref 3.4–5.3)
PROT SERPL-MCNC: 5.3 G/DL (ref 6.8–8.8)
RBC # BLD AUTO: 2.78 10E12/L (ref 4.4–5.9)
SODIUM SERPL-SCNC: 139 MMOL/L (ref 133–144)
SPECIMEN SOURCE: NORMAL
WBC # BLD AUTO: 6.9 10E9/L (ref 4–11)

## 2020-02-04 PROCEDURE — 85025 COMPLETE CBC W/AUTO DIFF WBC: CPT | Performed by: INTERNAL MEDICINE

## 2020-02-04 PROCEDURE — 97116 GAIT TRAINING THERAPY: CPT | Mod: GP

## 2020-02-04 PROCEDURE — 87040 BLOOD CULTURE FOR BACTERIA: CPT | Performed by: INTERNAL MEDICINE

## 2020-02-04 PROCEDURE — 12000001 ZZH R&B MED SURG/OB UMMC

## 2020-02-04 PROCEDURE — 80053 COMPREHEN METABOLIC PANEL: CPT | Performed by: INTERNAL MEDICINE

## 2020-02-04 PROCEDURE — 36415 COLL VENOUS BLD VENIPUNCTURE: CPT | Performed by: INTERNAL MEDICINE

## 2020-02-04 PROCEDURE — 25000132 ZZH RX MED GY IP 250 OP 250 PS 637: Performed by: STUDENT IN AN ORGANIZED HEALTH CARE EDUCATION/TRAINING PROGRAM

## 2020-02-04 PROCEDURE — 25000132 ZZH RX MED GY IP 250 OP 250 PS 637: Performed by: INTERNAL MEDICINE

## 2020-02-04 PROCEDURE — 97535 SELF CARE MNGMENT TRAINING: CPT | Mod: GO

## 2020-02-04 PROCEDURE — 97530 THERAPEUTIC ACTIVITIES: CPT | Mod: GP

## 2020-02-04 PROCEDURE — 00000146 ZZHCL STATISTIC GLUCOSE BY METER IP

## 2020-02-04 PROCEDURE — 99232 SBSQ HOSP IP/OBS MODERATE 35: CPT | Performed by: INTERNAL MEDICINE

## 2020-02-04 RX ORDER — AMLODIPINE BESYLATE 5 MG/1
5 TABLET ORAL DAILY
Status: DISCONTINUED | OUTPATIENT
Start: 2020-02-04 | End: 2020-02-06 | Stop reason: HOSPADM

## 2020-02-04 RX ADMIN — ASCORBIC ACID TAB 250 MG 250 MG: 250 TAB at 08:12

## 2020-02-04 RX ADMIN — SENNOSIDES AND DOCUSATE SODIUM 1 TABLET: 8.6; 5 TABLET ORAL at 08:12

## 2020-02-04 RX ADMIN — MELATONIN 1000 UNITS: at 08:12

## 2020-02-04 RX ADMIN — ASPIRIN 81 MG CHEWABLE TABLET 81 MG: 81 TABLET CHEWABLE at 08:12

## 2020-02-04 RX ADMIN — ASCORBIC ACID TAB 250 MG 250 MG: 250 TAB at 19:50

## 2020-02-04 RX ADMIN — FERROUS SULFATE TAB 325 MG (65 MG ELEMENTAL FE) 325 MG: 325 (65 FE) TAB at 19:50

## 2020-02-04 RX ADMIN — SIMVASTATIN 40 MG: 20 TABLET, FILM COATED ORAL at 22:07

## 2020-02-04 RX ADMIN — AMLODIPINE BESYLATE 5 MG: 5 TABLET ORAL at 15:47

## 2020-02-04 RX ADMIN — MELATONIN 1000 UNITS: at 19:50

## 2020-02-04 RX ADMIN — FERROUS SULFATE TAB 325 MG (65 MG ELEMENTAL FE) 325 MG: 325 (65 FE) TAB at 08:12

## 2020-02-04 RX ADMIN — LEVOFLOXACIN 750 MG: 750 TABLET, FILM COATED ORAL at 08:12

## 2020-02-04 RX ADMIN — CLOPIDOGREL BISULFATE 75 MG: 75 TABLET, FILM COATED ORAL at 19:50

## 2020-02-04 ASSESSMENT — ACTIVITIES OF DAILY LIVING (ADL)
ADLS_ACUITY_SCORE: 20

## 2020-02-04 NOTE — PLAN OF CARE
"Discharge Planner OT   Patient plan for discharge: TCU  Current status: Per pt request, discussed results of last weeks SLUMS assessment w/ pt & wife. Wife expressed concern that those results were not accurate due to his medical/mental state at that time. Requested another screening to determine more accurate cognitive status. Patient completed MoCA 8.1, scoring 20/30 where 26+ considered \"normal\" and MIS score of 9/15. Discussed results w/ pt and wife and strategies to compensate for difficulties w/ memory and attention.   Barriers to return to prior living situation: Deconditioning, fatigue, impaired cognition impacting ADL/IADL I  Recommendations for discharge: TCU  Rationale for recommendations: Pt will benefit from ongoing skilled therapy to increase strength, safety awareness, and functional cognition for ADL/IADL I        Entered by: Nafisa Dangelo 02/04/2020 1:15 PM     OT 7B  "

## 2020-02-04 NOTE — PLAN OF CARE
Patient has been assessed for Home Oxygen needs. Oxygen readings:    *Pulse oximetry (SpO2) = 95% on room air at rest while awake.    *SpO2 improved to 99% on 2 liters/minute at rest.    *SpO2 = 92% on room air during activity/with exercise.    *SpO2 improved to 94% on 1 liters/minute during activity/with exercise.

## 2020-02-04 NOTE — PROGRESS NOTES
Diabetes Consult Daily  Progress Note          Assessment/Plan:   Amos Walker is a 74 year old male with past medical hx significant for chronic tobacco use, CKD, CAD, PAD and type 2 diabetes complicated by Charcot joint, admitted 1/29/2020 with fever, fatigue, cough, weight loss, being treated for legionella pneumonia (levofloxacin 750 mg daily) experiencing persistent post-prandial hyperglycemia.  Acute care glucose target is typically 140-180 mg/dL and this is appropriate for Amos at this time.  With improved overall state of health, tighter glucose control could be appropriate.  But, agree with Dr. Swift's concern that his A1C may be reflective of too much hypoglycemia.     Improving post-prandial glucose control.  Fasting control on target, but monitor for need to reduce basal insulin.    Plan     - increase prandial aspart for breakfast to 1 unit per 10 grams carbohydrate  - increase prandial aspart from 1 per 15 to 1 per 12 grams carb for lunch, supper and PRN for snacks/supps between meals    (will suggest fixed meal dosing for TCU once placement confirmed)    - continue aspart medium resistance correction qAC, HS  - continue glargine 10 units , but adjust timing to pt's preferred AM schedule.  Move to supper time tonight, lunch next day, then to AM   Thursday  - continue to hold dulaglutide (Trulicity) 1.5 mg weekly (usual admin day Mondays).  Potential for restart at TCU, if appetite has improved.  But, given concern over weight loss, may be best delayed until outpatient reevaluation.  -BG monitor qAC, HS 0200  - follow up Dr. Swift, including Danny download.  Pt/wife will also check into seeing endocrinology at [facility]           Plan discussed with patient, bedside RN, and page to primary team.           Interval History:     The last 24 hours progress and nursing notes reviewed.  Reviewed content from interview yesterday, using hearing assist device today.  Amos  prefers glargine in AM so he doesn't forget.  He asks about insulin as a help to gain weight and the past recs for calorie intake that pushed him to discomfort.  Reviewed holding dulaglutide at this time, and reconsider when appetite better, weight more to target.  Also holding the dulag means insulin need is more.  He believes his appetite will increase once he's more active.  REviewed BG targets, considerations to be given for need to heal wound and to prevent hypoglycemia. Also explained inpatient BG targets compared to home targets.  He is feeling better. Anticipating discharge to TCU fairly soon.  OT notes MOCA score low today and may need memory/concentration aids.          PTA Regimen:  3x/day BG monitoring frequency  Breakfast usually at noon, then one or two mor meals.  Wife has been cooking lately, while pt not bearing weight on foot-  Usually 60 grams carb/meal     Limited activity r/t chronic foot ulcer     Lantus is 6 units (was 8) in morning  Humalog is 4 units for bfast, 3 units for lunch and supper  Trulicity 1.5 mg weekly on Mondays (was Saturdays before that)      Recent Labs   Lab 02/04/20  0534 02/04/20  0214 02/03/20  2140 02/03/20  1811 02/03/20  1242 02/03/20  0754 02/03/20  0221 02/02/20  2148  02/02/20  0726  01/31/20  1435  01/31/20  0922  01/30/20  0524   *  --  178*  --   --   --   --   --   --  183*  --  239*  --  226*  --  226*   BGM  --  117*  --  207* 228* 135* 152* 219*   < >  --    < >  --    < >  --    < >  --     < > = values in this interval not displayed.               Review of Systems:   See interval hx          Medications:     Orders Placed This Encounter      Moderate Consistent CHO Diet    Supplements: Boost  Physical Exam:  Gen: Alert, thin man, resting in bed, in NAD   HEENT: NC/AT, mucous membranes are moist, hearing impaired.  BEtter clarity using Pocket Talker  Resp: Unlabored  Ext: No lower extremity edema   Neuro:oriented x3, communicating clearly  /46  "(BP Location: Right arm)   Pulse 98   Temp 98  F (36.7  C) (Oral)   Resp 18   Ht 1.88 m (6' 2\")   Wt 78 kg (171 lb 15.3 oz)   SpO2 97%   BMI 22.08 kg/m             Data:     Lab Results   Component Value Date    A1C 5.8 12/20/2019    A1C 5.6 10/04/2019    A1C 6.7 03/27/2019    A1C 7.2 11/16/2018    A1C 6.6 06/21/2018              CBC RESULTS:   Recent Labs   Lab Test 02/04/20  0534   WBC 6.9   RBC 2.78*   HGB 8.8*   HCT 27.0*   MCV 97   MCH 31.7   MCHC 32.6   RDW 13.2        Recent Labs   Lab Test 02/04/20  0534 02/03/20  2140    143   POTASSIUM 3.5 5.4*   CHLORIDE 106 110*   CO2 28 25   ANIONGAP 5 8   * 178*   BUN 33* 38*   CR 1.08 1.57*   CLAUDIA 7.8* 7.1*     Liver Function Studies -   Recent Labs   Lab Test 02/04/20  0534   PROTTOTAL 5.3*   ALBUMIN 1.5*   BILITOTAL 0.6   ALKPHOS 308*   AST 55*   ALT 22     Lab Results   Component Value Date    INR 1.20 10/30/2019       I spent a total of 40 minutes bedside and on the inpatient unit managing the glycemic care of Amos Walker. Over 50% of my time on the unit was spent counseling the patient  and/or coordinating care regarding acute glucose mgmnt, planning for safe control at home.  See note for details.        Christianakiah Winn APRN [facility] [phone]  Diabetes Management job code [id number]                "

## 2020-02-04 NOTE — PROGRESS NOTES
Social Work Services Progress Note    Hospital Day: 7  Date of Initial Social Work Evaluation:  2/1/20- please see for details  Collaborated with:  Chart review, team rounds, [facility]    Data:  Pt is a 74 year old male being followed by SOM for TCU placement after being admitted with pneumonia.      Per Dr. cMcartney yesterday, pt will be on oral abx at discharge, endocrine was consulted, and pt is anticipated to be ready for discharge today. SOM later received update from Dr. Washington that pt is anticipated to be ready for discharge Wednesday after his acute respiratory failure has resolved and pt's blood cultures return. Pt is still anticipated to be on oral abx at discharge.      Pt has been clinically accepted at [facility] and the facility is pursuing insurance approval for pt.     Intervention:  SOM coordinated with pt's bedside nurse Leatha regarding pt's oxygen need and she reported she will assess if pt will indeed need this for discharge transportation.     SOM spoke with Edna in admissions at [facility] and she reported that pt's insurance contacted her requesting additional PT/OT documentation, which Edna reported she sent them and is waiting to hear back. SOM awaiting further update from Edna.     Assessment:  See bedside RN, PT/OT, medical team notes    Plan:    Anticipated Disposition:  Facility:  TCU- [facility] pending insurance approval    Barriers to d/c plan:  Medical stability, insurance approval is needed    Follow Up:  SOM [place] will continue to follow    BRENDON Mckeon, LICSW  7B   [phone] (pager) [phone]  2/4/2020

## 2020-02-04 NOTE — PLAN OF CARE
Vital signs:  Temp: 96.5  F (35.8  C) Temp src: Oral BP: 131/47 Pulse: (Abnormal) 43 Heart Rate: (Abnormal) 42 Resp: 18 SpO2: 94 % O2 Device: None (Room air)     Activity: Up with Ax1, walker.   Neuros: A&O x4. [place], hearing aids at bedside.   Cardiac: Bradycardic with HR 40-45, Provider aware.   Respiratory: LS diminished. On 2L O2 via NC with O2 sats mid 90s. Pulse ox on.   GI/: Voiding spontaneously. +BS, passing flatus.   Diet: Moderate consistent carb.   Skin: Wounds/scabs to BLE, gene.  Lines: Right PIV SL.  Labs: .   Pain/nausea: Denies.  Plan: Discharge to TCU pending insurance approval.

## 2020-02-04 NOTE — PLAN OF CARE
"/46 (BP Location: Right leg)   Pulse (!) 43   Temp 96.1  F (35.6  C) (Oral)   Resp 20   Ht 1.88 m (6' 2\")   Wt 78 kg (171 lb 15.3 oz)   SpO2 97%   BMI 22.08 kg/m      Status: Community-acquired pneumonia, legionella  Activity: Ax1 + walker.  Neuros: A&Ox4, no deficits noted ex: [place].  Cardiac: Bradycardic, team aware. Asymptomatic; continue to monitor. Denies chest pain.  Respiratory: WDL on 2L NC, denies SOB ex with activity. Infreq dry/nonproductive cough.  GI/: +BS, +BM in AM, AUOP using bedside urinal.  Diet: Mod CHO + carb counting.  Skin/Incisions: Scabs on LEs, rhonda RLE. R foot wound cares completed per orders; dressing CDI, brace intact. Skin dry throughout.  Lines/Drains: R PIV SL.  Pain/Nausea: Denies.  New Changes: No acute changes this shift.  Plan: Possible discharge to TCU tomorrow pending insurance approval.    "

## 2020-02-04 NOTE — PLAN OF CARE
Vitals: Temp: 98  F (36.7  C) Temp src: Oral BP: 136/46 Pulse: 98 Heart Rate: 84 Resp: 18 SpO2: 95 % O2 Device: Nasal cannula Oxygen Delivery: 2 LPM    Time: 2396-9204  Reason for admission: Community acquired pneumonia, legionella.   Activity:  Assist x1 with walker  Pain:  Pt denies pain and declines pain medication.   Neuro: A&O x4, able to make needs known. [place] - give proper time to respond.    Cardiac: ex, intermittent bradycardic and intermittent HTN. Team aware.   Respiratory:  ex, SOB on exertion pt on 2L NC at baseline. Frequent dry nonproductive cough. LS diminished.   GI/: +BS, +Flatus, No BM this shift. Voiding adequately in urinal at bedside.    Diet: Consistent carbohydrate diet/diabetic.   Lines:  PIV SL.   Incisions/Drains/Skin: Scab on bilateral lower extremities, brace on R foot c/d/i.    Lab:   before breakfast, 100 at lunch. Coverage given per MAR.   New changes this shift: Possible discharge to TCU pending insurance approval.     Continue to monitor and follow POC

## 2020-02-04 NOTE — PLAN OF CARE
Discharge Planner PT   Patient plan for discharge: rehab  Current status: Pt is SBA for supine>sit, CGA for sit<>stands from low surface heights for safety. Pt ambulates w/ FWW for 200', titration of O2 to assess SPO2 and O2 needs with activity, see separate note for details. Pt tolerates distance well, improved attention and adherence to weightbearing restrictions w/ brace. Encouraged pt to ambulate this evening w/ nursing staff in hallways (GB, SBA and FWW).   Barriers to return to prior living situation: medical, activity tolerance and mobility  Recommendations for discharge: TCU  Rationale for recommendations: Pt would benefit from rehab stay to progress activity tolerance, functional strength and overall IND w/ mobility prior to discharge home.        Entered by: Alpa Fernandez 02/04/2020 2:57 PM

## 2020-02-05 ENCOUNTER — APPOINTMENT (OUTPATIENT)
Dept: OCCUPATIONAL THERAPY | Facility: CLINIC | Age: 75
DRG: 871 | End: 2020-02-05
Payer: COMMERCIAL

## 2020-02-05 ENCOUNTER — APPOINTMENT (OUTPATIENT)
Dept: PHYSICAL THERAPY | Facility: CLINIC | Age: 75
DRG: 871 | End: 2020-02-05
Payer: COMMERCIAL

## 2020-02-05 LAB
ANION GAP SERPL CALCULATED.3IONS-SCNC: 5 MMOL/L (ref 3–14)
BASOPHILS # BLD AUTO: 0.1 10E9/L (ref 0–0.2)
BASOPHILS NFR BLD AUTO: 0.9 %
BUN SERPL-MCNC: 28 MG/DL (ref 7–30)
CALCIUM SERPL-MCNC: 8 MG/DL (ref 8.5–10.1)
CHLORIDE SERPL-SCNC: 105 MMOL/L (ref 94–109)
CO2 SERPL-SCNC: 27 MMOL/L (ref 20–32)
CREAT SERPL-MCNC: 1.08 MG/DL (ref 0.66–1.25)
DEPRECATED S PYO AG THROAT QL EIA: NORMAL
DIFFERENTIAL METHOD BLD: ABNORMAL
EOSINOPHIL # BLD AUTO: 0.1 10E9/L (ref 0–0.7)
EOSINOPHIL NFR BLD AUTO: 0.9 %
ERYTHROCYTE [DISTWIDTH] IN BLOOD BY AUTOMATED COUNT: 13.2 % (ref 10–15)
GFR SERPL CREATININE-BSD FRML MDRD: 68 ML/MIN/{1.73_M2}
GLUCOSE BLDC GLUCOMTR-MCNC: 102 MG/DL (ref 70–99)
GLUCOSE BLDC GLUCOMTR-MCNC: 124 MG/DL (ref 70–99)
GLUCOSE BLDC GLUCOMTR-MCNC: 82 MG/DL (ref 70–99)
GLUCOSE BLDC GLUCOMTR-MCNC: 91 MG/DL (ref 70–99)
GLUCOSE BLDC GLUCOMTR-MCNC: 95 MG/DL (ref 70–99)
GLUCOSE SERPL-MCNC: 78 MG/DL (ref 70–99)
HCT VFR BLD AUTO: 27.4 % (ref 40–53)
HGB BLD-MCNC: 9 G/DL (ref 13.3–17.7)
LYMPHOCYTES # BLD AUTO: 0.2 10E9/L (ref 0.8–5.3)
LYMPHOCYTES NFR BLD AUTO: 3.5 %
MCH RBC QN AUTO: 31.5 PG (ref 26.5–33)
MCHC RBC AUTO-ENTMCNC: 32.8 G/DL (ref 31.5–36.5)
MCV RBC AUTO: 96 FL (ref 78–100)
METAMYELOCYTES # BLD: 0.2 10E9/L
METAMYELOCYTES NFR BLD MANUAL: 2.6 %
MONOCYTES # BLD AUTO: 0.3 10E9/L (ref 0–1.3)
MONOCYTES NFR BLD AUTO: 4.3 %
MYELOCYTES # BLD: 0.1 10E9/L
MYELOCYTES NFR BLD MANUAL: 0.9 %
NEUTROPHILS # BLD AUTO: 5.7 10E9/L (ref 1.6–8.3)
NEUTROPHILS NFR BLD AUTO: 86.9 %
PLATELET # BLD AUTO: 229 10E9/L (ref 150–450)
PLATELET # BLD EST: ABNORMAL 10*3/UL
POIKILOCYTOSIS BLD QL SMEAR: SLIGHT
POTASSIUM SERPL-SCNC: 3.6 MMOL/L (ref 3.4–5.3)
RBC # BLD AUTO: 2.86 10E12/L (ref 4.4–5.9)
SODIUM SERPL-SCNC: 137 MMOL/L (ref 133–144)
SPECIMEN SOURCE: NORMAL
WBC # BLD AUTO: 6.6 10E9/L (ref 4–11)

## 2020-02-05 PROCEDURE — 25000132 ZZH RX MED GY IP 250 OP 250 PS 637: Performed by: STUDENT IN AN ORGANIZED HEALTH CARE EDUCATION/TRAINING PROGRAM

## 2020-02-05 PROCEDURE — 87081 CULTURE SCREEN ONLY: CPT | Performed by: INTERNAL MEDICINE

## 2020-02-05 PROCEDURE — 25000132 ZZH RX MED GY IP 250 OP 250 PS 637: Performed by: INTERNAL MEDICINE

## 2020-02-05 PROCEDURE — 00000146 ZZHCL STATISTIC GLUCOSE BY METER IP

## 2020-02-05 PROCEDURE — 80048 BASIC METABOLIC PNL TOTAL CA: CPT | Performed by: INTERNAL MEDICINE

## 2020-02-05 PROCEDURE — 97530 THERAPEUTIC ACTIVITIES: CPT | Mod: GP

## 2020-02-05 PROCEDURE — 36415 COLL VENOUS BLD VENIPUNCTURE: CPT | Performed by: INTERNAL MEDICINE

## 2020-02-05 PROCEDURE — 97110 THERAPEUTIC EXERCISES: CPT | Mod: GO

## 2020-02-05 PROCEDURE — 12000001 ZZH R&B MED SURG/OB UMMC

## 2020-02-05 PROCEDURE — 97116 GAIT TRAINING THERAPY: CPT | Mod: GP

## 2020-02-05 PROCEDURE — 87880 STREP A ASSAY W/OPTIC: CPT | Performed by: INTERNAL MEDICINE

## 2020-02-05 PROCEDURE — 99232 SBSQ HOSP IP/OBS MODERATE 35: CPT | Performed by: INTERNAL MEDICINE

## 2020-02-05 PROCEDURE — 85025 COMPLETE CBC W/AUTO DIFF WBC: CPT | Performed by: INTERNAL MEDICINE

## 2020-02-05 RX ORDER — NYSTATIN 100000/ML
500000 SUSPENSION, ORAL (FINAL DOSE FORM) ORAL 4 TIMES DAILY
Status: DISCONTINUED | OUTPATIENT
Start: 2020-02-05 | End: 2020-02-06 | Stop reason: HOSPADM

## 2020-02-05 RX ADMIN — FERROUS SULFATE TAB 325 MG (65 MG ELEMENTAL FE) 325 MG: 325 (65 FE) TAB at 08:24

## 2020-02-05 RX ADMIN — ASCORBIC ACID TAB 250 MG 250 MG: 250 TAB at 08:24

## 2020-02-05 RX ADMIN — NYSTATIN 500000 UNITS: 500000 SUSPENSION ORAL at 16:42

## 2020-02-05 RX ADMIN — ASCORBIC ACID TAB 250 MG 250 MG: 250 TAB at 21:03

## 2020-02-05 RX ADMIN — ASPIRIN 81 MG CHEWABLE TABLET 81 MG: 81 TABLET CHEWABLE at 08:24

## 2020-02-05 RX ADMIN — NYSTATIN 500000 UNITS: 500000 SUSPENSION ORAL at 21:03

## 2020-02-05 RX ADMIN — LEVOFLOXACIN 750 MG: 750 TABLET, FILM COATED ORAL at 08:24

## 2020-02-05 RX ADMIN — SIMVASTATIN 40 MG: 20 TABLET, FILM COATED ORAL at 22:30

## 2020-02-05 RX ADMIN — MELATONIN 1000 UNITS: at 08:24

## 2020-02-05 RX ADMIN — AMLODIPINE BESYLATE 5 MG: 5 TABLET ORAL at 08:24

## 2020-02-05 RX ADMIN — CLOPIDOGREL BISULFATE 75 MG: 75 TABLET, FILM COATED ORAL at 21:03

## 2020-02-05 RX ADMIN — FERROUS SULFATE TAB 325 MG (65 MG ELEMENTAL FE) 325 MG: 325 (65 FE) TAB at 21:03

## 2020-02-05 RX ADMIN — NYSTATIN 500000 UNITS: 500000 SUSPENSION ORAL at 09:24

## 2020-02-05 RX ADMIN — MELATONIN 1000 UNITS: at 21:03

## 2020-02-05 RX ADMIN — NYSTATIN 500000 UNITS: 500000 SUSPENSION ORAL at 13:20

## 2020-02-05 ASSESSMENT — ACTIVITIES OF DAILY LIVING (ADL)
ADLS_ACUITY_SCORE: 20

## 2020-02-05 NOTE — PROGRESS NOTES
Care Coordinator Progress Note    Admission Date/Time:  1/29/2020  Attending MD:  Kvng Washington MD    Data  Chart reviewed, discussed with interdisciplinary team.   Patient was admitted for:    Dehydration  Hyponatremia  Fever, unspecified fever cause  Pneumonia of right lower lobe due to infectious organism (H)  Hyposmolality syndrome  Hyperthermia-induced defect  Other pneumonia, unspecified organism  Type 2 diabetes mellitus without complication, with long-term current use of insulin (H).    Concerns with insurance coverage for discharge needs: None.  Current Living Situation: Patient lives with spouse.  Support System: Supportive and Involved  Services Involved: no services involved prior to admission  Transportation at Discharge: Family or friend will provide  Transportation to Medical Appointments:   - Name of caregiver: spouseaDnielle  Barriers to Discharge: no barriers identfied    Coordination of Care and Referrals: Provided patient/family with options for Home Care.        Assessment  Patient is a 74 year old male with past medical history of T2 diabetes, CAD, PAD, chronic kidney disease, admitted with community acquired pneumonia.  PT/OT recommending patient discharge to TCU, but per unit SW patients insurance has denied to pay for a TCU stay.  Met with patient and spouse at bedside to introduce RNCC role and discuss discharge planning.  Patient agreeable to discharge to home and would like home care services.  After giving choice of agency patient chose [facility](P: [phone], F: [phone]), referral made and orders placed.  Patient medically ready for discharge to home.  Patients spouse will provide transportation to home.              Plan  Anticipated Discharge Date:  2/5/2020  Anticipated Discharge Plan:  Home with [facility] for RN/PT/OT    JAIME Schmidt  Phone: [phone]  Pager: [phone]  To contact weekend RNCC, dial [phone] and enter pager number [phone] at prompt.  This pager can not be contacted by text page or outside line.

## 2020-02-05 NOTE — PLAN OF CARE
Discharge Planner PT   Patient plan for discharge: Home with assist, would like ongoing therapy in some capacity  Current status: Transferring, ambulating, climbing stairs with supervision using various gait devices.  Has difficulty keeping weight off of RLE.  Long discussion about his rehab needs with wife present, all in agreement pt is appropriate for home discharge.  Recommend he use a gait device at all times at home to minimize RLE weightbearing.  Barriers to return to prior living situation: medical status  Recommendations for discharge: Home with HHPT, use of gait devices at all times  Rationale for recommendations: Pt will benefit from ongoing mobility training focusing on needs related to right charcot foot as well as endurance training from recent PNA.        Entered by: Christy Momin 02/05/2020 4:16 PM

## 2020-02-05 NOTE — PROGRESS NOTES
[facility], [place]    Hospitalist Progress Note      Assessment & Plan   Amos Walker is a 74 year old adult who was admitted on 1/29/2020.  The patient presented to the hospital with cough productive of yellowish nonbloody sputum, subjective fever, generalized fatigue, and confusion.  He was found to desaturate at rest.  The patient has history of chronic tobacco use, type 2 diabetes mellitus complicated by Charcot joint, coronary artery disease status post PCI in March 2018, peripheral arterial disease, chronic kidney disease stage III 8,.  The patient was found to have evidence of community-acquired pneumonia.    1.  Sepsis, acute metabolic encephalopathy, and acute respiratory failure secondary to Legionella community-acquired pneumonia, all are POA:  The patient fulfilled 2 out of 4 Sirs criteria with a temperature of 101.6 and tachycardia on admission.  The patient's was reportedly confused on admission.  The patient needed oxygen on admission that he does not need at home.    His sepsis has resolved.  His acute metabolic encephalopathy has resolved.  And his acute respiratory failure has resolved.  Legionella work-up was positive.  His respiratory virus panel is negative.    Given the patient active tobacco use, there was concern for lung malignancy on admission.  However CT chest was more consistent with pneumonia than a malignancy  -Continue levofloxacin to complete a total course of 10 days of antibiotic therapy  -Repeat CT chest within a month of discharge as outpatient to ensure the resolution of the right lower lung structure abnormalities     2.  Acute renal failure KDIGO stage I on chronic kidney disease stage IIIa complicated by hyponatremia, all are POA:  Patient received 2 L of IV fluids in the emergency department.  His renal failure has resolved.    3.  Chronic right lower extremity wound, POA  The patient has history of debridement for sacral nonhealing area prior  to admission.  He was started on cefdinir 300 mg twice daily on 1/25/2020.  He is currently on antibiotics as detailed above.  -Patient will follow-up with podiatry upon discharge    4.  Type 2 diabetes mellitus currently on insulin  -Continue insulin aspart medium resistance correction scale  -Continue insulin glargine 10 units in a.m.  -Continue to hold dulaglutide, to be resumed outpatient    5.  Uncontrolled hypertension  -Started amlodipine 5 mg daily    6.  Chronic medical problems  Coronary artery disease, continue aspirin and clopidogrel  Hyperlipidemia, continue simvastatin    DVT Prophylaxis: Pneumatic Compression Devices  Code Status: Full Code  Expected discharge: Tomorrow, recommended to prior living arrangement once Pending insurance approval to go to [facility].    Kvng Washington MD  Text Page (7am - 6pm, M-F)    Interval History   The patient denied any chest pain, shortness of breath, or subjective fever  10 point review of systems is otherwise negative    -Data reviewed today: I reviewed all new labs and imaging results over the last 24 hours. I personally reviewed no images or EKG's today.    Physical Exam   Temp: 98.4  F (36.9  C) Temp src: Oral BP: 128/45 Pulse: 82 Heart Rate: 60 Resp: 18 SpO2: 94 % O2 Device: Nasal cannula Oxygen Delivery: 2 LPM  Vitals:    01/30/20 1519 01/30/20 1908 01/31/20 1959   Weight: 73.6 kg (162 lb 4.1 oz) 74.2 kg (163 lb 9.6 oz) 78 kg (171 lb 15.3 oz)     Vital Signs with Ranges  Temp:  [96.5  F (35.8  C)-98.4  F (36.9  C)] 98.4  F (36.9  C)  Pulse:  [82-98] 82  Heart Rate:  [40-88] 60  Resp:  [18] 18  BP: (128-184)/(45-74) 128/45  SpO2:  [90 %-97 %] 94 %  I/O last 3 completed shifts:  In: 240 [P.O.:240]  Out: 750 [Urine:750]    Constitutional: Awake, alert, cooperative, no apparent distress.  Eyes: Conjunctiva and pupils examined and normal.  HEENT: Moist mucous membranes, normal dentition.  Respiratory: Clear to auscultation bilaterally, no crackles or  wheezing.  Cardiovascular: Regular rate and rhythm, normal S1 and S2, and no murmur noted.  GI: Soft, non-distended, non-tender, normal bowel sounds.  Lymph/Hematologic: No anterior cervical or supraclavicular adenopathy.  Skin: No rashes, no cyanosis, no edema.  Musculoskeletal: No joint swelling, erythema or tenderness.  Neurologic: Cranial nerves 2-12 intact, normal strength and sensation.  Psychiatric: Alert, oriented to person, place and time, no obvious anxiety or depression.    Medications       amLODIPine  5 mg Oral Daily     aspirin  81 mg Oral Daily     clopidogrel  75 mg Oral QPM     ferrous sulfate  325 mg Oral BID    And     vitamin C  250 mg Oral BID     insulin aspart   Subcutaneous BID w/meals     insulin aspart   Subcutaneous Daily with breakfast     insulin aspart  1-7 Units Subcutaneous TID AC     insulin aspart  1-5 Units Subcutaneous At Bedtime     insulin glargine  10 Units Subcutaneous Daily with supper     levofloxacin  750 mg Oral Daily     polyethylene glycol  17 g Oral Daily     senna-docusate  1 tablet Oral BID    Or     senna-docusate  2 tablet Oral BID     simvastatin  40 mg Oral At Bedtime     sodium chloride (PF)  3 mL Intravenous Q8H     sodium chloride (PF)  3 mL Intracatheter Q8H     Vitamin D3  1,000 Units Oral BID       Data   Recent Labs   Lab 02/04/20  0534 02/03/20  2140 02/02/20  0726  01/31/20  0922  01/29/20  1706   WBC 6.9  --  4.7  --  6.3   < > 5.5   HGB 8.8*  --  9.3*  --  9.4*   < > 9.2*   MCV 97  --  97  --  95   < > 95     --  156  --  135*   < > 117*    143 138   < > 135   < > 128*   POTASSIUM 3.5 5.4* 3.7   < > 3.0*   < > 3.7   CHLORIDE 106 110* 106   < > 107   < > 96   CO2 28 25 26   < > 22   < > 25   BUN 33* 38* 26   < > 37*   < > 68*   CR 1.08 1.57* 1.00   < > 1.13   < > 1.90*   ANIONGAP 5 8 6   < > 6   < > 6   CLAUDIA 7.8* 7.1* 8.3*   < > 7.9*   < > 7.8*   * 178* 183*   < > 226*   < > 388*   ALBUMIN 1.5*  --   --   --   --   --  2.0*   PROTTOTAL  5.3*  --   --   --   --   --  6.0*   BILITOTAL 0.6  --   --   --   --   --  1.0   ALKPHOS 308*  --  260*  --   --   --  173*   ALT 22  --   --   --   --   --  26   AST 55*  --   --   --   --   --  68*    < > = values in this interval not displayed.       No results found for this or any previous visit (from the past 24 hour(s)).

## 2020-02-05 NOTE — PLAN OF CARE
Vitals: Temp: 97.8  F (36.6  C) Temp src: Oral BP: 132/64   Heart Rate: 60 Resp: 18 SpO2: 90 % O2 Device: None (Room air)      Time: 9092-5050  Reason for admission: Community acquired pneumonia, legionella.   Activity:  Assist x1 with walker, ambulated in [place] x1, and room to bathroom.   Pain:  Pt denies pain and declines pain medication.   Neuro: A&O x4, able to make needs known. [place] - give proper time to respond.    Cardiac: WDL  Respiratory:  Pt on room air. Denies SOB.   GI/: +BS, +Flatus, +small BM this shift. Voiding adequately.   Diet: Consistent carbohydrate diet/diabetic.   Lines:  PIV SL.   Incisions/Drains/Skin: Scab on bilateral lower extremities, brace on R foot c/d/i.    Lab:  BG in morning 95, 91 at lunch, 124 at dinner.   New changes this shift: Pt to discharge tomorrow with home health after podiatry sees the pt.       Continue to monitor and follow POC

## 2020-02-05 NOTE — PROGRESS NOTES
Diabetes Consult Daily  Progress Note          Assessment/Plan:   Amos Walker is a 74 year old male with past medical hx significant for chronic tobacco use, CKD, CAD, PAD and type 2 diabetes complicated by Charcot joint, admitted 2020 with fever, fatigue, cough, weight loss, being treated for legionella pneumonia (levofloxacin 750 mg daily) experiencing persistent post-prandial hyperglycemia.  Acute care glucose target is typically 140-180 mg/dL and this is appropriate for Amos at this time.  With improved overall state of health, tighter glucose control could be appropriate.  But, agree with Dr. Swift's concern that his A1C may be reflective of too much hypoglycemia.     Glucose improving significantly with better carb coverage and increasing physical activity.    Plan     - decrease prandial aspart for breakfast to 1 unit per 12 grams carbohydrate  - decrease prandial aspart from 1 per 12 to 1 per 15 grams carb for lunch, supper and continue 1 per 12g  PRN for snacks/supps between meals    Prelim suggestion for fixed meal dosin units for breakfast, 3 units for lunch, 7 units for supper.  Supplements covered with 2 units.    - continue aspart medium resistance correction qAC, HS  - decrease glargine from 10 to 8 units , but adjust timing to pt's preferred AM schedule.  Move to 1400 today, then to AM tomorrow    - continue to hold dulaglutide (Trulicity) 1.5 mg weekly (usual admin day ).  Potential for restart at TCU, if appetite has improved.  But, given concern over weight loss, may be best delayed until outpatient reevaluation.  -BG monitor qAC, HS 0200  - follow up Dr. Swift, including Danny download.  Pt/wife will also check into seeing endocrinology at [facility]           Plan discussed with patient, bedside RN           Interval History:     The last 24 hours progress and nursing notes reviewed.  Amos complained of sore throat this morning.  Eating pretty  "well for breakfast and suppers.  Believes he'll have better appetite once activity improves more.  Notes indicate may be going home rather than TCU.          PTA Regimen:  3x/day BG monitoring frequency  Breakfast usually at noon, then one or two mor meals.  Wife has been cooking lately, while pt not bearing weight on foot-  Usually 60 grams carb/meal     Limited activity r/t chronic foot ulcer     Lantus is 6 units (was 8) in morning  Humalog is 4 units for bfast, 3 units for lunch and supper  Trulicity 1.5 mg weekly on Mondays (was Saturdays before that)      Recent Labs   Lab 02/05/20  1256 02/05/20  0746 02/05/20  0700 02/05/20  0156 02/04/20  2206 02/04/20  1748 02/04/20  1327 02/04/20  0534  02/03/20  2140  02/02/20  0726  01/31/20  1435  01/31/20  0922   GLC  --   --  78  --   --   --   --  120*  --  178*  --  183*  --  239*  --  226*   BGM 91 95  --  102* 103* 90 100*  --    < >  --    < >  --    < >  --    < >  --     < > = values in this interval not displayed.               Review of Systems:   See interval hx          Medications:     Orders Placed This Encounter      Moderate Consistent CHO Diet    Supplements: Boost  Physical Exam:  Gen: Alert, thin man, up in chair, in NAD   HEENT: NC/AT, mucous membranes are moist, hearing impaired.   Resp: Unlabored  Ext: No lower extremity edema   Neuro:oriented x3, communicating clearly  /64   Pulse 82   Temp 97.8  F (36.6  C) (Oral)   Resp 18   Ht 1.88 m (6' 2\")   Wt 78 kg (171 lb 15.3 oz)   SpO2 90%   BMI 22.08 kg/m             Data:     Lab Results   Component Value Date    A1C 5.8 12/20/2019    A1C 5.6 10/04/2019    A1C 6.7 03/27/2019    A1C 7.2 11/16/2018    A1C 6.6 06/21/2018            Recent Labs   Lab Test 02/05/20  0700 02/04/20  0534    139   POTASSIUM 3.6 3.5   CHLORIDE 105 106   CO2 27 28   ANIONGAP 5 5   GLC 78 120*   BUN 28 33*   CR 1.08 1.08   CLAUDIA 8.0* 7.8*     CBC RESULTS:   Recent Labs   Lab Test 02/05/20  0700   WBC 6.6   RBC " 2.86*   HGB 9.0*   HCT 27.4*   MCV 96   MCH 31.5   MCHC 32.8   RDW 13.2        Christianakiah Winn APRN -[id number]  Diabetes Management job code [id number]

## 2020-02-05 NOTE — PLAN OF CARE
"/45 (BP Location: Right arm)   Pulse 82   Temp 98.4  F (36.9  C) (Oral)   Resp 18   Ht 1.88 m (6' 2\")   Wt 78 kg (171 lb 15.3 oz)   SpO2 94%   BMI 22.08 kg/m      Status: Community-acquired pneumonia, legionella  Activity: Ax1 + walker.  Neuros: A&Ox4, no deficits noted ex: [place].  Cardiac: Int bradycardic, team aware. Asymptomatic; continue to monitor. Denies chest pain.  Respiratory: WDL on RA, denies SOB ex with activity. Infreq dry/nonproductive cough.  GI/: +BS, +BM this shift, AUOP using bedside urinal.  Diet: Mod CHO + carb counting.  Skin/Incisions: Scabs on LEs, rhonda RLE; dressing CDI, brace intact. Skin dry throughout.  Lines/Drains: R PIV SL.  Pain/Nausea: Denies.  New Changes: No acute changes this shift.  Plan: Possible discharge to TCU tomorrow pending insurance approval.     "

## 2020-02-05 NOTE — PROGRESS NOTES
CLINICAL NUTRITION SERVICES - REASSESSMENT NOTE     Nutrition Prescription    RECOMMENDATIONS FOR MDs/PROVIDERS TO ORDER:  None at this time.     Malnutrition Status:    Non-severe malnutrition in the context of acute on chronic illness    Recommendations already ordered by Registered Dietitian (RD):  - Supplements: chocolate Boost Glucose Control between meals  - Weight meausrement     Future/Additional Recommendations:  Monitor weight status.    If suspect inadequate PO intake (<50% nutritionally adequate meals/supplements TID), recommend adjust supplements per pt's preference and order calorie counts (pending anticipated LOS).        EVALUATION OF THE PROGRESS TOWARD GOALS   Diet:   Moderate Consistent Carbohydrate + Supplements: Boost Glucose Control between meals    Intake:   Per flowsheets, pt consuming % of 2-3 meals most days. Per review of [employer] (room service software) pt ordering 3 trays from kitchen daily, averaging 475 kcal/tray and 22 g protein/tray. Met with patient today who reports that his appetite has been improving each day. He reports that he has been enjoying the Boost Glucose Control supplements and has been consuming 1 per day in addition to meals for the past 3 days. Notes that he prefers chocolate.     Pt reports that at baseline he typically consumes 3 meals daily, however PTA he was eating less d/t not feeling well.     NEW FINDINGS   Chart Review:   Sepsis, acute respiratory failure, and acute metabolic encephalopathy resolved. Discharge pending TCU placement.    Labs:   Glucose 91 <- 95 <- 78 <- 102  BUN 28, Cr 28    Medications:   Ferrous sulfate 325 mg BID and Ascorbic acid 250 mg BID  Insulin sliding scale + Lantus 8 units daily  Bowel regimen  Cholecalciferol 1000 units BID  K+ replacement PRN per standard replacement protocol    Weight:   Throughout admission weight has fluctuated between 72 - 78 kg. Current weight is overall +6 kg from admission weight. Dosing weight  remains appropriate. Pt reports that he has been losing weight, although he is not sure how much or the timeframe. Pt reports that he would like to weight ~170 lbs (171 lbs as of 1/31, however likely fluid related in part). Pt reports that he will be weighed today.     GI:  Per intake/output, last BM documented today with 1x occurrence. Abdomen noted to be soft, non-tender, and non-distended.     Skin:  Pt with chronic R lower extremity wound. No pressure injuries noted. WOC not following this admission. Pt to follow up with podiatry upon discharge.     MALNUTRITION  % Intake: Decreased intake does not meet criteria   % Weight Loss: Unable to assess (none during admission, however pt reports weight loss PTA)  Subcutaneous Fat Loss: Facial region, Upper arm and Lower arm:  Mild  Muscle Loss: Temporal, Facial & jaw region, Thoracic region (clavicle, acromium bone, deltoid, trapezius, pectoral), Upper arm (bicep, tricep), Lower arm  (forearm):  Moderate, Dorsal hand:  Severe and Posterior calf:  Moderate  Fluid Accumulation/Edema: None noted  Malnutrition Diagnosis: Non-severe malnutrition in the context of acute on chronic illness    Previous Goals   Patient to consume % of nutritionally adequate meal trays TID, or the equivalent with supplements/snacks.  Evaluation: Met    Previous Nutrition Diagnosis  Predicted inadequate nutrient intake (energy & protein) related to unknown etiology as evidenced by reduced lean body mass, under IBW and previous weight loss and slow weight gain.   Evaluation: Updated below    CURRENT NUTRITION DIAGNOSIS  Predicted inadequate nutrient intake (energy, protein) related to potential for appetite disruptions and prolonged LOS, however currently consuming % of 2-3 meals and pt reports consuming 100% 1 supplement daily.      INTERVENTIONS  Implementation  Medical food supplement therapy - modified to Boost Glucose Control flavor to chocolate per pt preference    Nutrition  education for recommended modifications - encouraged 3 nutritionally adequate meals daily all with source of protein. Encouraged continued supplement use to help ensure adequate nutrition provisions. All nutrition related questions/concerns were addressed.     Goals  Patient to consume % of nutritionally adequate meal trays TID, or the equivalent with supplements/snacks.    Monitoring/Evaluation  Progress toward goals will be monitored and evaluated per protocol.    Roge Weaver, MS, RD, LD  5A [phone])/7B floor pager [phone]

## 2020-02-05 NOTE — PROGRESS NOTES
Social Work Services Progress Note    Hospital Day: 8  Date of Initial Social Work Evaluation:  2/1/20- please see for details  Collaborated with:  Chart review, team rounds, , Dr. Washington, pt, pt's wife Danielle ([phone])    Data:  Pt is a 74 year old male being followed by SOM for TCU placement after being admitted with pneumonia.      Per Dr. Mccartney yesterday, pt will be on oral abx at discharge, endocrine was consulted, and pt is anticipated to be ready for discharge today. SOM later received update from Dr. Washington that pt is anticipated to be ready for discharge Wednesday after his acute respiratory failure has resolved and pt's blood cultures return. Pt is still anticipated to be on oral abx at discharge.      Pt has been clinically accepted at  and the facility is pursuing insurance approval for pt.     Intervention:  SOM spoke with Edna in admissions at  (p. [phone], has access to Epic) and she reported that pt is insurance denied coverage for TCU and they are requesting a peer to peer call. This call can occur by pt's MD calling [phone], selecting option 4 and asking to speak with Dr. Parra. Edna noted being told this needs to occur by 4:30pm today.     SOM paged Dr. Washington to provide this information, waiting to hear back. SOM then paged Dr. Washington with the above information, waiting to hear back. Dr. Washington informed SOM at 9:30am that he placed the call for peer to peer and left a vm asking for a call back for this to be arranged. SOM awaiting further update. Dr. Washington updated SOM at approximately 3pm that he spoke with pt's insurance and insurance is still denying TCU coverage for pt and they feel pt's needs can be met at home with home PT/OT.     SOM met with pt in pt's room to check in and provide update. Pt was sitting in bed talking with his bedside nurse and Dr. Washington, whom left shortly after SW arrived. SOM updated  "pt as to clinical acceptance at [facility] but that pt's insurance denied TCU coverage. SOM explained that Dr. Washington is trying to connect with pt's insurance to do a peer to peer call to advocate for pt. SW explained that if pt's insurance still denies coverage there is the option of privately paying for a stay at [facility] and pt replied no, he would do things at home then. SW asked if pt feels he still needs TCU or if he feels he could go home and pt replied that it would be nice to go and have an extra day or two and be evaluated by the staff at [facility]. SW explained that we will see how the peer to peer goes and explained that pt should not count on having TCU coverage. Pt reported he would be agreeable to home PT/OT as well. SW explained RNCC role with discharge planning to home. Pt requested that SW updated his wife Danielle.     SOM called pt's wife, Danielle, per pt's request and provided update on clinical acceptance at [facility] but pt's insurance denied coverage. SOM explained that peer to peer is occurring but it cannot be guaranteed this would result in TCU coverage. SW explained private pay TCU option to Danielle as well and she reported that this is financially not an option.     Danielle discussed being worried about COPD or emphysema for pt and also noted pt's cognitive assessment didn't come back very good and said she suspected it wouldn't come back with \"flying colors\".   SOM explained that we will see where the peer to peer leads things and also explained RNCC role with discharge planning in case things switch to a discharge to home. Danielle noted that home PT/OT would be acceptable if pt's insurance would cover this.     SOM updated RNCC Inessa as to official insurance denial for TCU as well as  conversation with pt and Danielle.     SOM met with pt, pt's wife Danielle, and PT Sophia in pt's room and provided update on official insurance denial for TCU coverage. Sophia reported she agrees with " the plan for home with home PT/OT.     Assessment:  See bedside RN, PT/OT, medical team notes    Plan:    Anticipated Disposition:  TBD    Barriers to d/c plan:  Insurance denied TCU for pt, possible peer to peer call to occur    Follow Up:  SOM [place] will continue to follow    BRENDON Mckeon, [facility] HospitalSW  7B   [phone] (pager) [phone]  2/5/2020

## 2020-02-05 NOTE — PLAN OF CARE
Discharge Planner OT   Patient plan for discharge: TCU  Current status: Pt mod I for bed mobility w/ HOB raised. Pt CGA for STS w/ FWW. SBA for transfer to chair. Pt completed 6 B UE exercises w/ 10x reps each w/ demonstration and VC for proper technique. Encouraged pt to complete exercises 3x daily to increase ADL/IADL I, pt verbalized understanding.  Barriers to return to prior living situation: Fatigue, deconditioning, impaired memory impacting ADL/IADL I.  Recommendations for discharge: TCU  Rationale for recommendations: Pt will benefit from ongoing skilled therapy to increase strength and endurance for participation and I w/ ADL/IADL.          Entered by: Nafisa Dnagelo 02/05/2020 8:46 AM      OT 7B

## 2020-02-05 NOTE — PLAN OF CARE
Vital signs:  Temp: 97.5  F (36.4  C) Temp src: Oral BP: 129/53 Pulse: 82 Heart Rate: 81 Resp: 18 SpO2: 97 % O2 Device: None (Room air)     Activity: Up with Ax1, walker.   Neuros: A&O x4. [place], hearing aids at bedside.   Cardiac:WDL.  Respiratory: LS diminished. On RA. Pulse ox on.   GI/: Voiding spontaneously. +BS, passing flatus.   Diet: Moderate consistent carb.   Skin: Wounds/scabs to BLE, gene.  Lines: Right PIV SL.  Labs: .  Pain/nausea: Denies.  Plan: Discharge to TCU pending insurance approval.

## 2020-02-06 ENCOUNTER — PATIENT OUTREACH (OUTPATIENT)
Dept: CARE COORDINATION | Facility: CLINIC | Age: 75
End: 2020-02-06

## 2020-02-06 VITALS
BODY MASS INDEX: 22.07 KG/M2 | OXYGEN SATURATION: 97 % | HEART RATE: 82 BPM | WEIGHT: 171.96 LBS | TEMPERATURE: 97.2 F | RESPIRATION RATE: 18 BRPM | DIASTOLIC BLOOD PRESSURE: 64 MMHG | SYSTOLIC BLOOD PRESSURE: 135 MMHG | HEIGHT: 74 IN

## 2020-02-06 LAB
GLUCOSE BLDC GLUCOMTR-MCNC: 118 MG/DL (ref 70–99)
GLUCOSE BLDC GLUCOMTR-MCNC: 135 MG/DL (ref 70–99)
GLUCOSE BLDC GLUCOMTR-MCNC: 146 MG/DL (ref 70–99)

## 2020-02-06 PROCEDURE — 99239 HOSP IP/OBS DSCHRG MGMT >30: CPT | Performed by: INTERNAL MEDICINE

## 2020-02-06 PROCEDURE — 25000132 ZZH RX MED GY IP 250 OP 250 PS 637: Performed by: INTERNAL MEDICINE

## 2020-02-06 PROCEDURE — 00000146 ZZHCL STATISTIC GLUCOSE BY METER IP

## 2020-02-06 PROCEDURE — 25000132 ZZH RX MED GY IP 250 OP 250 PS 637: Performed by: PODIATRIST

## 2020-02-06 PROCEDURE — 25000132 ZZH RX MED GY IP 250 OP 250 PS 637: Performed by: STUDENT IN AN ORGANIZED HEALTH CARE EDUCATION/TRAINING PROGRAM

## 2020-02-06 RX ORDER — LEVOFLOXACIN 750 MG/1
750 TABLET, FILM COATED ORAL DAILY
Qty: 1 TABLET | Refills: 0 | Status: SHIPPED | OUTPATIENT
Start: 2020-02-07 | End: 2020-02-17

## 2020-02-06 RX ORDER — AMMONIUM LACTATE 12 G/100G
CREAM TOPICAL DAILY
Status: DISCONTINUED | OUTPATIENT
Start: 2020-02-06 | End: 2020-02-06 | Stop reason: HOSPADM

## 2020-02-06 RX ADMIN — NYSTATIN 500000 UNITS: 500000 SUSPENSION ORAL at 07:33

## 2020-02-06 RX ADMIN — Medication: at 12:05

## 2020-02-06 RX ADMIN — NYSTATIN 500000 UNITS: 500000 SUSPENSION ORAL at 12:06

## 2020-02-06 RX ADMIN — FERROUS SULFATE TAB 325 MG (65 MG ELEMENTAL FE) 325 MG: 325 (65 FE) TAB at 07:33

## 2020-02-06 RX ADMIN — ASCORBIC ACID TAB 250 MG 250 MG: 250 TAB at 07:31

## 2020-02-06 RX ADMIN — MELATONIN 1000 UNITS: at 07:33

## 2020-02-06 RX ADMIN — ASPIRIN 81 MG CHEWABLE TABLET 81 MG: 81 TABLET CHEWABLE at 07:31

## 2020-02-06 RX ADMIN — LEVOFLOXACIN 750 MG: 750 TABLET, FILM COATED ORAL at 07:33

## 2020-02-06 RX ADMIN — AMLODIPINE BESYLATE 5 MG: 5 TABLET ORAL at 07:33

## 2020-02-06 ASSESSMENT — ACTIVITIES OF DAILY LIVING (ADL)
ADLS_ACUITY_SCORE: 20

## 2020-02-06 NOTE — PROGRESS NOTES
Diabetes Consult Daily  Progress Note          Assessment/Plan:   Amos Walker is a 74 year old male with past medical hx significant for chronic tobacco use, CKD, CAD, PAD and type 2 diabetes complicated by Charcot joint, admitted 2020 with fever, fatigue, cough, weight loss, being treated for legionella pneumonia (levofloxacin 750 mg daily) experiencing persistent post-prandial hyperglycemia.  Acute care glucose target is typically 140-180 mg/dL and this is appropriate for Amos at this time.  With improved overall state of health, tighter glucose control could be appropriate.  But, agree with Dr. Swift's concern that his A1C may be reflective of too much hypoglycemia.     Glucose improved significantly with better carb coverage and increasing physical activity.    Plan     - prandial aspart for breakfast 1 unit per 12 grams carbohydrate  - prandial aspart  1 per 15 grams carb for lunch, supper and 1 per 12g  PRN for snacks/supps between meals    For Home Fixed meal dosin units for breakfast, 3 units for lunch, 4 units for supper.   (in place of carb counted insulin    - continue aspart medium resistance correction qAC, HS--> continue at discharge  - continue glargine 8 units qAM      - continue to hold dulaglutide (Trulicity) 1.5 mg weekly (usual admin day ).  Potential for restart at TCU, if appetite has improved.  But, given concern over weight loss, may be best delayed until outpatient reevaluation.  -BG monitor qAC, HS 0200  - follow up Dr. Swift, including Danny download.  Pt/wife will also check into seeing endocrinology at [facility]           Plan discussed with patient, wife, bedside RN, page to primary team, discharge pharmacist           Interval History:     The last 24 hours progress and nursing notes reviewed.  Amos will go home.  Reviewed plans w/ him and wife.  He will eat more like last brian at home, previous two nights of larger meals were a  "great struggle.  More questions from his wife about eating interval.  And reviewed to keep Trulicity on hold.  He would like to have the sliding scale for correcting high BG (in the past he would extend the period between eating to get his BG down).            PTA Regimen:  3x/day BG monitoring frequency  Breakfast usually at noon, then one or two mor meals.  Wife has been cooking lately, while pt not bearing weight on foot-  Usually 60 grams carb/meal     Limited activity r/t chronic foot ulcer     Lantus is 6 units (was 8) in morning  Humalog is 4 units for bfast, 3 units for lunch and supper  Trulicity 1.5 mg weekly on Mondays (was Saturdays before that)      Recent Labs   Lab 02/06/20  0738 02/06/20  0219 02/05/20  2224 02/05/20  1729 02/05/20  1256 02/05/20  0746 02/05/20  0700  02/04/20  0534  02/03/20  2140  02/02/20  0726  01/31/20  1435  01/31/20  0922   GLC  --   --   --   --   --   --  78  --  120*  --  178*  --  183*  --  239*  --  226*   * 135* 82 124* 91 95  --    < >  --    < >  --    < >  --    < >  --    < >  --     < > = values in this interval not displayed.               Review of Systems:   See interval hx          Medications:     Orders Placed This Encounter      Moderate Consistent CHO Diet      Diet    Supplements: Boost  Physical Exam:  Gen: Alert, thin man, resting in bed in NAD, wife at bedside, invovled   HEENT: NC/AT, mucous membranes are moist, hearing impaired.   Resp: Unlabored  Ext: No lower extremity edema   Neuro:oriented x3, communicating clearly  BP (!) 143/62 (BP Location: Right arm)   Pulse 82   Temp 97.5  F (36.4  C) (Oral)   Resp 18   Ht 1.88 m (6' 2\")   Wt 78 kg (171 lb 15.3 oz)   SpO2 96%   BMI 22.08 kg/m             Data:     Lab Results   Component Value Date    A1C 5.8 12/20/2019    A1C 5.6 10/04/2019    A1C 6.7 03/27/2019    A1C 7.2 11/16/2018    A1C 6.6 06/21/2018            Recent Labs   Lab Test 02/05/20  0700 02/04/20  0534    139   POTASSIUM " 3.6 3.5   CHLORIDE 105 106   CO2 27 28   ANIONGAP 5 5   GLC 78 120*   BUN 28 33*   CR 1.08 1.08   CLAUDIA 8.0* 7.8*     CBC RESULTS:   Recent Labs   Lab Test 02/05/20  0700   WBC 6.6   RBC 2.86*   HGB 9.0*   HCT 27.4*   MCV 96   MCH 31.5   MCHC 32.8   RDW 13.2        Christiana Winn APRN -[id number]  Diabetes Management job code [id number]    I spent a total of 35 minutes bedside and on the inpatient unit managing the glycemic care of Amos Walker. Over 50% of my time on the unit was spent counseling the patient and wife and/or coordinating care regarding dicharge insulin/GLP-1 ag plan.  See note for details.

## 2020-02-06 NOTE — PLAN OF CARE
Occupational Therapy Discharge Summary    Reason for therapy discharge:    Discharged to home.    Progress towards therapy goal(s). See goals on Care Plan in [facility] electronic health record for goal details.  Goals partially met.  Barriers to achieving goals:   discharge from facility.    Therapy recommendation(s):    Continue home exercise program.
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
## 2020-02-06 NOTE — DISCHARGE INSTRUCTIONS
DIABETES PLAN-    Check glucose before meals and before Bed  Lantus 8 units in the ;morning  Humalog 4 units for breakfast, 3 units for lunch, 4 units for supper  HUmalog for reducing a blood sugar, based on pre-meal or bedtime sugar:  For Pre-Meal  - 189 give 1 unit.   For Pre-Meal  - 239 give 2 units.   For Pre-Meal  - 289 give 3 units.   For Pre-Meal  - 339 give 4 units.   For Pre-Meal BG = or > 340 give 5 units.   BEDTIME-  For  - 249 give 1 units.   For  - 299 give 2 units.   For  - 349 give 3 units.   For BG = or > 350 give 4 units.     Trulicity on hold until discussed with Dr. Swift  Aim for three meals/day to aid in weight gain, spacing meals 3-4.5 hours apart.  Okay to use nutritional supplements.

## 2020-02-06 NOTE — PLAN OF CARE
"Shift: 2300 - 0700  VS: /53 (BP Location: Right arm)   Pulse 82   Temp 98.4  F (36.9  C) (Oral)   Resp 18   Ht 1.88 m (6' 2\")   Wt 78 kg (171 lb 15.3 oz)   SpO2 97%   BMI 22.08 kg/m      Neuro: A&Ox4, CMS at baseline, very [place] - HA at bedside  Cardiac: pulses palpable  Resp: productive cough with scant sputum, lung sounds coarse, no SOB reported, sating well on RA, IS encouraged  GI: passing gas, bowel sounds active, LBM 2/5  : voiding spontaneously, adequate amount of yellow urine   Skin: BLE scabbing, RLE lace-up boot intact, mepilex on R heel  Pain/Nausea: denies pain or nausea  LDA: R PIV SL  Diet: diabetic  Mobility: SBA FWW  Labs: reviewed, 0200   Plan: podiatry consult, possible discharge in AM, continue plan of care     "

## 2020-02-06 NOTE — DISCHARGE SUMMARY
[facility], [place]    Discharge Summary  Hospitalist    Date of Admission:  1/29/2020  Date of Discharge:  2/6/2020  1:27 PM  Discharging Provider: Kvng Washington MD  Date of Service (when I saw the patient): 02/06/20    Discharge Diagnoses   1.  Sepsis, acute metabolic encephalopathy, and acute respiratory failure secondary to Legionella community-acquired pneumonia, all are POA  2.  Staph epidermidis contamination of blood culture, POA  3.  Acute renal failure KDIGO stage I on chronic kidney disease stage IIIa complicated by hyponatremia, all are POA:  4.  Concerns for structural lung abnormality on CT chest with imaging favoring pneumonia rather than mass, all are POA  5.  Multiple ulcers in right anterior leg, right lateral foot, and eschar in the left foot, all are POA.   6.  New pressure changes on the plantar checkered foot on the right, undetermined and incidence  7.  Type 2 diabetes mellitus currently on insulin, POA  8.  Controlled hypertension, POA  9.  Concerns for cognitive impairment, POA  10.  Nonsevere malnutrition in the context of acute on chronic illness, POA  11. As symptomatic gallbladder stones, POA  History of Present Illness   Amos Walker is an 74 year old adult who presented with with cough productive of yellowish nonbloody sputum, subjective fever, generalized fatigue, and confusion.  He was found to desaturate at rest.  The patient has history of chronic tobacco use, type 2 diabetes mellitus complicated by Charcot joint, coronary artery disease status post PCI in March 2018, peripheral arterial disease, chronic kidney disease stage III 8,.  The patient was found to have evidence of community-acquired pneumonia.    Hospital Course   Amos Walker was admitted on 1/29/2020.  The following problems were addressed during his hospitalization:  1.  Sepsis, acute metabolic encephalopathy, and acute respiratory failure secondary to Legionella community-acquired  pneumonia, all are POA:  The patient fulfilled 2 out of 4 Sirs criteria with a temperature of 101.6 and tachycardia on admission.  There is no evidence of lactic acidemia The patient's was reportedly confused on admission.  The patient needed oxygen on admission that he does not need at home.  The patient received IV fluids on admission.  He was started on ceftriaxone and azithromycin.  Urine Legionella came back as positive as such, the patient was switched to levofloxacin.Sepsis, acute metabolic encephalopathy, and acute respiratory failure are all resolved 72 hours prior to discharge.  The patient was discharged to complete a total of 10-day course of antibiotic therapy with 1 more day of levofloxacin.    2.  Staph epidermidis contamination of blood culture, POA  There was no evidence of bacteremia during this admission.  However, blood culture from 1/29/2020 was abnormal for staph epidermidis.  This was 1 out of 2 cultures.  Repeat blood cultures performed on 2/4/2020 was negative to date.  Blood cultures to be followed by the patient's primary care provider.    3.  Acute renal failure KDIGO stage I on chronic kidney disease stage IIIa complicated by hyponatremia, all are POA:  Patient received 2 L of IV fluids in the emergency department.  His renal failure has resolved.    4.  Concerns for structural lung abnormality on CT chest with imaging favoring pneumonia rather than mass in a patient with a history of emphysema, all are POA  Given the patient history of tobacco use, the patient will need a repeat CT chest within a month of discharge to ensure resolution of right lower lobe abnormality that was found on CT chest.  I referred the patient to pulmonology as outpatient.    5.  Multiple ulcers in right anterior leg, right lateral foot, and eschar in the left foot, all are POA  The patient was advised to be minimal weightbearing with his walker and cane.  His left foot to be cleaned with microkleanz daily.  Amlactin prescription was given to the patient.  He will follow-up with podiatry within 1 week of discharge.  He was seen by podiatry during this admission.      6.  New pressure changes on the plantar checkered foot on the right, undetermined and incidence  This is likely related to using a pillow in bed while resting his leg on it with using a brace.  The patient was seen by podiatry who advised him against this he will continue to use his [place] brace with diabetic shoes.  Mepilex border to be applied to the right plantar foot daily.  As detailed above, the patient was seen by podiatry during this admission.    7.  Type 2 diabetes mellitus currently on insulin, POA  The patient insulin requirement and control of his blood glucose were achieved based on recommendations of endocrinology during this admission.  He was discharged on home 6/3 meal dosing with 4 units for breakfast, 3 units for lunch, and 4 units and supper in place of carbohydrate counting based insulin regimen.  The patient is to continue on aspart medium resistance correction scale upon discharge as well based on recommendations of endocrinology.  The patient was also initiated on insulin glargine 8 units in a.m.  The patient's dulaglutide was held during this admission.  The patient is to follow-up with  his primary care.  He will also consider following up with endocrinology at [facility].    8.  Controlled hypertension, POA  The patient was started on amlodipine 5 mg daily that controlled his blood pressure.    9.  Concerns for cognitive impairment, POA  Reviewed the patient to neuropsychiatry as outpatient based on concerns of his wife for cognitive deterioration.  I advised them outpatient to prevent treatment while he recovers from his encephalopathy.  Of note, brain MRI was unremarkable for any acute stroke during this admission.    10.  Nonsevere malnutrition in the context of acute on chronic illness, POA  The patient was started  on supplements with chocolate boost glucose control between meals based on recommendations of adult nutritionist.    11. As symptomatic gallbladder stones, POA  This was found as an incidental finding during this admission.    Kvng Washington MD    Significant Results and Procedures       Pending Results   These results will be followed up by primary care  Unresulted Labs Ordered in the Past 30 Days of this Admission     Date and Time Order Name Status Description    2/5/2020 0845 Beta strep group A culture In process     2/4/2020 1406 Blood culture Preliminary     2/4/2020 1406 Blood culture Preliminary           Code Status   Full Code       Primary Care Physician   Racheal Truong Luciofermín    Physical Exam   Temp: 97.5  F (36.4  C) Temp src: Oral BP: (!) 143/62   Heart Rate: 82 Resp: 18 SpO2: 96 % O2 Device: None (Room air)    Vitals:    01/30/20 1519 01/30/20 1908 01/31/20 1959   Weight: 73.6 kg (162 lb 4.1 oz) 74.2 kg (163 lb 9.6 oz) 78 kg (171 lb 15.3 oz)     Vital Signs with Ranges  Temp:  [97.5  F (36.4  C)-98.4  F (36.9  C)] 97.5  F (36.4  C)  Heart Rate:  [72-82] 82  Resp:  [18] 18  BP: (135-143)/(53-62) 143/62  SpO2:  [95 %-97 %] 96 %  I/O last 3 completed shifts:  In: 600 [P.O.:600]  Out: 1775 [Urine:1775]    Constitutional: Awake, alert, cooperative, no apparent distress.  Eyes: Conjunctiva and pupils examined and normal.  HEENT: Moist mucous membranes, normal dentition.  Respiratory: Clear to auscultation bilaterally, no crackles or wheezing.  Cardiovascular: Regular rate and rhythm, normal S1 and S2, and no murmur noted.  GI: Soft, non-distended, non-tender, normal bowel sounds.  Lymph/Hematologic: No anterior cervical or supraclavicular adenopathy.  Skin: No rashes, no cyanosis, no edema.  Musculoskeletal: No joint swelling, erythema or tenderness.  Neurologic: Cranial nerves 2-12 intact, normal strength and sensation.  Psychiatric: Alert, oriented to person, place and time, no obvious anxiety or  depression.    Discharge Disposition   Discharged to home  Condition at discharge: Stable    Consultations This Hospital Stay   MEDICATION HISTORY IP PHARMACY CONSULT  WOUND OSTOMY CONTINENCE NURSE  IP CONSULT  IP RESPIRATORY CARE CHRONIC PULMONARY DISEASE SPECIALIST  PHYSICAL THERAPY ADULT IP CONSULT  PODIATRY IP CONSULT  PHARMACY TO DOSE VANCO  VASCULAR ACCESS CARE ADULT IP CONSULT  OCCUPATIONAL THERAPY ADULT IP CONSULT  ENDOCRINE DIABETES ADULT IP CONSULT  PHYSICAL THERAPY ADULT IP CONSULT  ENDOCRINE DIABETES ADULT IP CONSULT  PULMONARY GENERAL ADULT IP CONSULT  PODIATRY IP CONSULT    Time Spent on this Encounter   IKvng MD, personally saw the patient today and spent greater than 30 minutes discharging this patient.    Discharge Orders      Home care nursing referral      Home care nursing referral      Home Care PT Referral for Hospital Discharge      MD face to face encounter    Documentation of Face to Face and Certification for Home Health Services    I certify that patient: Amos Walker is under my care and that I, or a nurse practitioner or physician's assistant working with me, had a face-to-face encounter that meets the physician face-to-face encounter requirements with this patient on: 2/5/2020.    This encounter with the patient was in whole, or in part, for the following medical condition, which is the primary reason for home health care: deconditioning, patient hospitalized and treated for community acquired pneumonia.    I certify that, based on my findings, the following services are medically necessary home health services: Nursing, Occupational Therapy and Physical Therapy.    My clinical findings support the need for the above services because: Nurse is needed: to assist with wound care and monitor patient in home setting after hospitalization., Occupational Therapy Services are needed to assess and treat cognitive ability and address ADL safety due to impairment in endurance.  and Physical Therapy Services are needed to assess and treat the following functional impairments: mobility.    Further, I certify that my clinical findings support that this patient is homebound (i.e. absences from home require considerable and taxing effort and are for medical reasons or Yars[facility] services or infrequently or of short duration when for other reasons) because: Requires assistance of another person or specialized equipment to access medical services because patient: Requires supervision of another for safe transfer...    Based on the above findings. I certify that this patient is confined to the home and needs intermittent skilled nursing care, physical therapy and/or speech therapy.  The patient is under my care, and I have initiated the establishment of the plan of care.  This patient will be followed by a physician who will periodically review the plan of care.  Physician/Provider to provide follow up care: Racheal Swift    Attending [facility] [place] physician (the Medicare certified [place] provider): Dr. Kvng Washington  Physician Signature: See electronic signature associated with these discharge orders.  Date: 2/5/2020     Reason for your hospital stay    You are admitted to the hospital with overwhelming pneumonia.  He had respiratory failure that resolved.  Continue to take antibiotics for 1 more day.  Your insulin regimen was adjusted by endocrinology.  Thank you for the opportunity of taking care of you.     Adult [facility] Follow-up and recommended labs and tests    Follow up with primary care provider, Racheal Swift, within 7 days to evaluate medication change, to evaluate treatment change and for hospital follow- up.  No follow up labs or test are needed.  The patient will need an outpatient evaluation by neuropsychiatry for concerns for cognitive impairment  The patient will need an evaluation by pulmonology outpatient for concerns for COPD.  The patient will need an evaluation at his  home for home safety.    Appointments on [place] and/or [facility] (with [facility] or [facility] provider or service). Call [phone] if you haven't heard regarding these appointments within 7 days of discharge.     Activity    Your activity upon discharge: activity as tolerated     MD face to face encounter    Documentation of Face to Face and Certification for Home Health Services    I certify that patient: Amos Walker is under my care and that I, or a nurse practitioner or physician's assistant working with me, had a face-to-face encounter that meets the physician face-to-face encounter requirements with this patient on: 2/6/2020.    This encounter with the patient was in whole, or in part, for the following medical condition, which is the primary reason for home health care: Home physical therapy, home nursing    I certify that, based on my findings, the following services are medically necessary home health services: Nursing and Physical Therapy.    My clinical findings support the need for the above services because: Nurse is needed: To assess blood glucose after changes in medications or other medical regimen.. and Physical Therapy Services are needed to assess and treat the following functional impairments: Functional impairment.    Further, I certify that my clinical findings support that this patient is homebound (i.e. absences from home require considerable and taxing effort and are for medical reasons or Hoahaoism services or infrequently or of short duration when for other reasons) because: Patient is bedbound due to: Poor functional mobility..    Based on the above findings. I certify that this patient is confined to the home and needs intermittent skilled nursing care, physical therapy and/or speech therapy.  The patient is under my care, and I have initiated the establishment of the plan of care.  This patient will be followed by a physician who will periodically review the plan of  care.  Physician/Provider to provide follow up care: Racheal Swift    Attending hospital physician (the Medicare certified [place] provider): Kvng Washington MD  Physician Signature: See electronic signature associated with these discharge orders.  Date: 2/6/2020     Full Code     Diet    Follow this diet upon discharge: Orders Placed This Encounter      Snacks/Supplements Adult: Boost Glucose Control; Between Meals      Moderate Consistent CHO Diet     Discharge Medications   Current Discharge Medication List      START taking these medications    Details   insulin aspart (NOVOLOG PEN) 100 UNIT/ML pen Inject 2 Units Subcutaneous daily (with breakfast)  Qty: 10 mL, Refills: 0    Associated Diagnoses: Type 2 diabetes, controlled, with neuropathy (H)      levofloxacin (LEVAQUIN) 750 MG tablet Take 1 tablet (750 mg) by mouth daily  Qty: 1 tablet, Refills: 0    Associated Diagnoses: Pneumonia of upper lobe due to infectious organism, unspecified laterality (H)         CONTINUE these medications which have CHANGED    Details   insulin glargine (LANTUS PEN) 100 UNIT/ML pen Inject 8 Units Subcutaneous every morning  Qty: 10 mL, Refills: 0    Comments: If Lantus is not covered by insurance, may substitute Basaglar at same dose and frequency.    Associated Diagnoses: Type 2 diabetes, controlled, with neuropathy (H)         CONTINUE these medications which have NOT CHANGED    Details   ammonium lactate (LAC-HYDRIN) 12 % cream Apply topically 2 times daily as needed for dry skin  Qty: 385 g, Refills: 3    Associated Diagnoses: Venous stasis; Type 2 diabetes, controlled, with neuropathy (H)      ascorbic acid 500 MG TABS Take 1 tablet (500 mg) by mouth 2 times daily  Qty: 30 tablet    Associated Diagnoses: Ulcer of right lower leg, with fat layer exposed (H)      aspirin 81 MG EC tablet Take 81 mg by mouth daily      clopidogrel (PLAVIX) 75 MG tablet Take 1 tablet (75 mg) by mouth every evening  Qty: 90 tablet, Refills:  "3    Associated Diagnoses: Peripheral vascular disease, unspecified (H)      polyethylene glycol (MIRALAX/GLYCOLAX) powder Take 17 g (1 capful) by mouth daily  Qty: 255 g, Refills: 1    Associated Diagnoses: Constipation, unspecified constipation type      simvastatin (ZOCOR) 40 MG tablet Take 1 tablet (40 mg) by mouth At Bedtime  Qty: 90 tablet, Refills: 3    Associated Diagnoses: Type 2 diabetes, controlled, with neuropathy (H); PVD (peripheral vascular disease) (H); Coronary artery disease due to lipid rich plaque      VITAMIN D, CHOLECALCIFEROL, PO Take 1,000 Units by mouth 2 times daily      blood glucose monitoring (FREESTYLE) lancets Use to test blood sugars 4 times daily as directed.  Qty: 100 each, Refills: 11    Associated Diagnoses: Type 2 diabetes, uncontrolled, with neuropathy (H)      Blood Pressure KIT 1 Device daily  Qty: 1 kit, Refills: 0    Associated Diagnoses: Benign essential hypertension      Continuous Blood Gluc  (FREESTYLE LATOYA 14 DAY READER) TANIA 1 Device every hour as needed (every hour prn)  Qty: 1 Device, Refills: 0    Associated Diagnoses: Type 2 diabetes mellitus with diabetic peripheral angiopathy without gangrene, with long-term current use of insulin (H)      continuous blood glucose monitoring (FREESTYLE LATOYA) sensor For use with Freestyle Latoya Flash  for continuous monitioring of blood glucose levels. Replace sensor every 14 days.  Qty: 4 each, Refills: 3    Associated Diagnoses: Type 2 diabetes mellitus with diabetic peripheral angiopathy without gangrene, with long-term current use of insulin (H)      Gauze Pads & Dressings (OPTIFOAM) 6\"X6\" PADS 1 Box once a week  Qty: 1 each, Refills: 6    Associated Diagnoses: Ulcer of right leg, with fat layer exposed (H)      insulin pen needle (B-D U/F) 31G X 8 MM miscellaneous USE FOUR DAILY AS DIRECTED  Qty: 100 each, Refills: 11      ketoconazole (NIZORAL) 2 % external shampoo Apply topically twice a week Leave on for " 3-5 min then rinse off; after 2-4 weeks use only once weekly  Qty: 120 mL, Refills: 3    Associated Diagnoses: Seborrheic dermatitis of scalp      lisinopril (PRINIVIL/ZESTRIL) 2.5 MG tablet Take 1 tablet (2.5 mg) by mouth daily  Qty: 90 tablet, Refills: 1    Associated Diagnoses: Hypotension, unspecified hypotension type      ONETOUCH ULTRA test strip TEST TWICE DAILY AS DIRECTED  Qty: 200 strip, Refills: 3    Associated Diagnoses: Type 2 diabetes mellitus (H)      !! order for DME Equipment being ordered: Roll a bout knee scooter  Qty: 1 Device, Refills: 0    Associated Diagnoses: Charcot's joint of foot, right      !! order for DME Please measure and distribute 1 pair of 30mmHg - 40mmHg knee high open toe ulcercare compression stockings. Jobst ultrasheer or equivalent.  Qty: 2 each, Refills: 6    Associated Diagnoses: Varicose veins of bilateral lower extremities with other complications       !! - Potential duplicate medications found. Please discuss with provider.      STOP taking these medications       cefdinir (OMNICEF) 300 MG capsule Comments:   Reason for Stopping:         dulaglutide (TRULICITY) 1.5 MG/0.5ML pen Comments:   Reason for Stopping:         ferrous sulfate (IRON) 325 (65 FE) MG tablet Comments:   Reason for Stopping:         insulin lispro (HUMALOG PEN) 100 UNIT/ML pen Comments:   Reason for Stopping:             Allergies   Allergies   Allergen Reactions     Lisinopril Other (See Comments)     dizziness     Neomycin Other (See Comments)     Wound gets worse     Methylchloroisothiazolinone [Methylisothiazolinone] Rash     Povidone Iodine Rash     Data   Most Recent 3 CBC's:  Recent Labs   Lab Test 02/05/20  0700 02/04/20  0534 02/02/20  0726   WBC 6.6 6.9 4.7   HGB 9.0* 8.8* 9.3*   MCV 96 97 97    176 156      Most Recent 3 BMP's:  Recent Labs   Lab Test 02/05/20  0700 02/04/20  0534 02/03/20  2140    139 143   POTASSIUM 3.6 3.5 5.4*   CHLORIDE 105 106 110*   CO2 27 28 25   BUN 28  33* 38*   CR 1.08 1.08 1.57*   ANIONGAP 5 5 8   CLAUDIA 8.0* 7.8* 7.1*   GLC 78 120* 178*     Most Recent 2 LFT's:  Recent Labs   Lab Test 02/04/20  0534 02/02/20  0726 01/29/20  1706   AST 55*  --  68*   ALT 22  --  26   ALKPHOS 308* 260* 173*   BILITOTAL 0.6  --  1.0     Most Recent INR's and Anticoagulation Dosing History:  Anticoagulation Dose History     Recent Dosing and Labs Latest Ref Rng & Units 8/29/2017 8/30/2017 9/19/2017 12/19/2017 2/27/2018 3/1/2018 10/30/2019    Warfarin 1 mg - 4 mg - - - - - -    INR 0.86 - 1.14 1.91(H) 2.42(H) 1.58(H) 1.05 1.05 1.13 1.20(H)        Most Recent 3 Troponin's:  Recent Labs   Lab Test 10/30/19  2347 10/30/19  1620   TROPI <0.015 <0.015     Most Recent Cholesterol Panel:  Recent Labs   Lab Test 03/27/19  0934   CHOL 95   LDL 49   HDL 38*   TRIG 40     Most Recent 6 Bacteria Isolates From Any Culture (See EPIC Reports for Culture Details):  Recent Labs   Lab Test 02/05/20  0845 02/04/20  1429 02/04/20  1425 01/29/20  1832 01/29/20  1706 10/30/19  2106   CULT Culture negative monitoring continues No growth after 2 days No growth after 2 days Cultured on the 1st day of incubation:  Staphylococcus epidermidis  *  Critical Value/Significant Value, preliminary result only, called to and read back by  Sara Mills RN, @6531 01/30/20.DH.    (Note)  POSITIVE for STAPHYLOCOCCUS EPIDERMIDIS and POSITIVE for the mecA  gene (resistant to methicillin) by [employer] multiplex nucleic acid  test. Final identification and antimicrobial susceptibility testing  will be verified by standard methods.    Specimen tested with Verigene multiplex, gram-positive blood culture  nucleic acid test for the following targets: Staph aureus, Staph  epidermidis, Staph lugdunensis, other Staph species, Enterococcus  faecalis, Enterococcus faecium, Streptococcus species, S. agalactiae,  S. anginosus grp., S. pneumoniae, S. pyogenes, Listeria sp., mecA  (methicillin resistance) and Vivien/B (vancomycin  resistance).    Critical Value/Significant Value called to and read back by  Sara Mills RN 1/31/20 @ 0055 TF   No growth No growth     Most Recent TSH, T4 and A1c Labs:  Recent Labs   Lab Test 01/31/20  0922 12/20/19   TSH 0.60  --    A1C  --  5.8*     Results for orders placed or performed during the hospital encounter of 01/29/20   XR Chest 2 Views    Narrative    EXAMINATION: XR CHEST 2 VW, 1/29/2020 6:01 PM    INDICATION: cough and fever    COMPARISON: Chest radiograph 10/30/2019    FINDINGS: AP and lateral upright views of the chest.     Trachea is midline. Cardiomediastinal borders are clear. Cardiac  silhouette is not enlarged. Right lower lobe large focal airspace  opacity. Left lower lung is unremarkable. No appreciable pleural  effusion. No pneumothorax. No acute osseous abnormalities. Soft  tissues are grossly unremarkable.        Impression    IMPRESSION:  Large right lower lobe focal airspace opacity likely pneumonia.    I have personally reviewed the examination and initial interpretation  and I agree with the findings.    DAVONTE BEDOYA MD   CT Chest w/o Contrast    Narrative    EXAM: CT CHEST W/O CONTRAST  LOCATION: [facility]  DATE/TIME: 1/29/2020 10:32 PM    INDICATION: Productive cough with blood-tinged sputum. Evaluate for malignancy or etiology for cough.  COMPARISON: 05/11/2018.  TECHNIQUE: CT chest without IV contrast. Multiplanar reformats were obtained. Dose reduction techniques were used.  CONTRAST: None.    FINDINGS:   LUNGS AND PLEURA: Dense consolidative alveolar infiltrate involving the right lower lobe with air bronchogram formation. Findings compatible with pneumonia. Minimal hazy ill-defined infiltrate posterior aspect of the right upper lobe and right middle   lobe. No evidence for acute focal infiltrate or consolidation left lung. Minimal right basilar pleural fluid. Moderate emphysema. Bronchial wall thickening in the right lung related to the inflammatory  change.    MEDIASTINUM/AXILLAE: Allowing for the noncontrast study there is no evidence for overt lymphadenopathy. Normal heart size. No pericardial fluid. Advanced atherosclerotic vascular calcification. Normal caliber esophagus. No axillary lymphadenopathy.    UPPER ABDOMEN: Partially visualized 1 cm calculus within the gallbladder lumen. Adrenal glands negative.    MUSCULOSKELETAL: Minimal degenerative changes in the spine.      Impression    IMPRESSION:   1.  Dense consolidative alveolar infiltrate right lower lobe with hazy ill-defined infiltrate posterior aspect right upper lobe and right middle lobe. Findings compatible with pneumonia. Recommend continued CT follow-up to assess for complete resolution   given the dense consolidation as a right infrahilar mass or lesion within the right lower lung cannot be excluded.    2.  Moderate emphysema.    3.  Minimal right basilar pleural fluid.    4.  Advanced atherosclerotic vascular calcification including coronary artery calcification.    5.  Partially visualized 1 cm calculus in the gallbladder lumen.     MR Brain w/o & w Contrast    Narrative    Brain MRI without and with contrast    History: Encephalopathy.    Comparison: None    Technique: Axial FLAIR,  T1-weighted, and susceptibility images were  obtained without intravenous contrast. Following intravenous  gadolinium-based contrast administration, axial T2-weighted,  diffusion, FLAIR, and axial and coronal T1-weighted images were  obtained.     Contrast: 7.5 ml of Gadavist     Findings:   There is no mass effect, midline shift, or evidence of intracranial  hemorrhage.  The ventricles are not enlarged out of proportion to the  cerebral sulci. The gray-white matter differentiation of the cerebral  hemispheres is preserved. Mild global cerebral volume loss. Multiple  T2 hyperintensities in the subcortical and periventricular white  matter, likely representing chronic small vessel ischemic disease.  Postcontrast  images demonstrate no abnormal intracranial parenchymal  or meningeal enhancement.    The major vascular flow-voids appear patent. The orbits, visualized  portions of paranasal sinuses, and mastoid air cells are relatively  clear.      Impression    Impression:  1. No intracranial hemorrhage, midline shift, or hydrocephalus.   2. No abnormal contrast enhancing lesions intracranially.    I have personally reviewed the examination and initial interpretation  and I agree with the findings.    ANA SEVERINO MD

## 2020-02-06 NOTE — H&P
Admitted:     01/29/2020      SUBJECTIVE FINDINGS:  A 74-year-old male re-consulting podiatry by Raman Fishman MD for a foot check.  The patient is seen at bedside resting.  He looks like he is doing fairly well.  He is feeling a little bit better.  He relates that on the right foot they noticed a red spot on it on the bottom yesterday.  He relates no injury.  He is using his Arizona brace.  No other specific relieving or aggravating factors.  He has diabetic shoes as well.  He relates he will be going home from the hospital today.       OBJECTIVE FINDINGS:  DP and PT are 2/4 bilaterally.  Eschars on the left dorsal foot hallux, medial and lateral ankles.  The medial one has come off.  There is no erythema, no active drainage, no odor, no calor from these.  They are sweetie.  His right anterior leg ulcer remains closed.  The right lateral foot ulcer remains closed.  There is some hyperkeratotic tissue buildup in the lateral fifth metatarsal base area on the right foot.  He has Charcot foot on the right.  He has some new mild pressure erythema on the plantar Charcot foot.  There are no skin openings.  There is not erythema, no odor, no calor bilaterally.      ASSESSMENT AND PLAN:  Ulcer, right anterior leg; ulcer, right lateral foot; eschar ulcers, left foot.  These remain closed.  He has new pressure changes on the plantar Charcot foot on the right.  He relates he is using a pillow in bed.  This appears to be from sliding down on the bed and resting his brace on the end of the bed.  I advised him against this, although he is going home today.  Continue his Arizona brace with the diabetic shoe.  He can be minimal weightbearing with his walker and cane.  They have been applying Mepilex Border to the right plantar foot.  Continue to do that daily.  Continue to clean the left foot with MicroKlenz daily.  Amlactin prescription given to use and discussed with him.  He will return to clinic to see me in 1 week.          TOÑO TOMLINSON DPM             D: 2020   T: 2020   MT: DEMETRIO      Name:     RASHEED SORIANO   MRN:      [id number]        Account:      [id number]   :      1945        Admitted:     2020                   Document: [id number]

## 2020-02-06 NOTE — PROGRESS NOTES
[facility]    Hospitalist Progress Note      Assessment & Plan   Amos Walker is a 74 year old adult who was admitted on 1/29/2020.  The patient presented to the hospital with cough productive of yellowish nonbloody sputum, subjective fever, generalized fatigue, and confusion.  He was found to desaturate at rest.  The patient has history of chronic tobacco use, type 2 diabetes mellitus complicated by Charcot joint, coronary artery disease status post PCI in March 2018, peripheral arterial disease, chronic kidney disease stage III 8,.  The patient was found to have evidence of community-acquired pneumonia.    1.  Sepsis, acute metabolic encephalopathy, and acute respiratory failure secondary to Legionella community-acquired pneumonia, all are POA:  Sepsis, acute metabolic encephalopathy, and acute respiratory failure are all resolved  -Continue levofloxacin to complete a total course of 10 days of antibiotic therapy, to be completed on 2/7/2020  -Repeat CT chest within a month of discharge as outpatient to ensure the resolution of the right lower lung structure abnormalities     2.  Acute renal failure KDIGO stage I on chronic kidney disease stage IIIa complicated by hyponatremia, all are POA:  Patient received 2 L of IV fluids in the emergency department.  His renal failure has resolved.    3.  Chronic right lower extremity wound, POA  The patient has history of debridement for sacral nonhealing area prior to admission.  He was started on cefdinir 300 mg twice daily on 1/25/2020.  He is currently on antibiotics as detailed above.  -Requested an inpatient follow-up with podiatry    4.  Type 2 diabetes mellitus currently on insulin  -Continue insulin aspart medium resistance correction scale  -Decreased insulin glargine from 10 units to 8 units units in a.m.  -Continue to hold dulaglutide, to be resumed outpatient    5.  Controlled hypertension  -Continue amlodipine 5 mg  daily    6.  Nonsevere malnutrition in the context of acute on chronic illness, POA  -The patient was started on chocolate boost glucose control supplement between meals based on recommendations of nutritionist    7.  Chronic medical problems  Coronary artery disease, continue aspirin and clopidogrel  Hyperlipidemia, continue simvastatin    DVT Prophylaxis: Pneumatic Compression Devices  Code Status: Full Code  Expected discharge: Tomorrow, recommended to prior living arrangement once Pending insurance approval to go to TCU.    Kvng Washington MD  Text Page (7am - 6pm, M-F)    Interval History   The patient denied any shortness of breath, wheezing, orthopnea.  10 point review of systems is otherwise negative    -Data reviewed today: I reviewed all new labs and imaging results over the last 24 hours. I personally reviewed no images or EKG's today.    Physical Exam   Temp: 98.4  F (36.9  C) Temp src: Oral BP: 135/53   Heart Rate: 82 Resp: 18 SpO2: 97 % O2 Device: None (Room air)    Vitals:    01/30/20 1519 01/30/20 1908 01/31/20 1959   Weight: 73.6 kg (162 lb 4.1 oz) 74.2 kg (163 lb 9.6 oz) 78 kg (171 lb 15.3 oz)     Vital Signs with Ranges  Temp:  [97.5  F (36.4  C)-98.4  F (36.9  C)] 98.4  F (36.9  C)  Heart Rate:  [60-82] 82  Resp:  [18] 18  BP: (129-135)/(53-64) 135/53  SpO2:  [90 %-98 %] 97 %  I/O last 3 completed shifts:  In: 240 [P.O.:240]  Out: 800 [Urine:800]    Constitutional: Awake, alert, cooperative, no apparent distress.  Eyes: Conjunctiva and pupils examined and normal.  HEENT: Moist mucous membranes, normal dentition.  Respiratory: Clear to auscultation bilaterally, no crackles or wheezing.  Cardiovascular: Regular rate and rhythm, normal S1 and S2, and no murmur noted.  GI: Soft, non-distended, non-tender, normal bowel sounds.  Lymph/Hematologic: No anterior cervical or supraclavicular adenopathy.  Skin: No rashes, no cyanosis, no edema.  Musculoskeletal: No joint swelling, erythema or  tenderness.  Neurologic: Cranial nerves 2-12 intact, normal strength and sensation.  Psychiatric: Alert, oriented to person, place and time, no obvious anxiety or depression.    Medications       amLODIPine  5 mg Oral Daily     aspirin  81 mg Oral Daily     clopidogrel  75 mg Oral QPM     ferrous sulfate  325 mg Oral BID    And     vitamin C  250 mg Oral BID     insulin aspart   Subcutaneous BID w/meals     insulin aspart   Subcutaneous Daily with breakfast     insulin aspart  1-7 Units Subcutaneous TID AC     insulin aspart  1-5 Units Subcutaneous At Bedtime     insulin glargine  8 Units Subcutaneous Q24H     levofloxacin  750 mg Oral Daily     nystatin  500,000 Units Oral 4x Daily     polyethylene glycol  17 g Oral Daily     senna-docusate  1 tablet Oral BID    Or     senna-docusate  2 tablet Oral BID     simvastatin  40 mg Oral At Bedtime     sodium chloride (PF)  3 mL Intravenous Q8H     sodium chloride (PF)  3 mL Intracatheter Q8H     Vitamin D3  1,000 Units Oral BID       Data   Recent Labs   Lab 02/05/20  0700 02/04/20  0534 02/03/20  2140 02/02/20  0726   WBC 6.6 6.9  --  4.7   HGB 9.0* 8.8*  --  9.3*   MCV 96 97  --  97    176  --  156    139 143 138   POTASSIUM 3.6 3.5 5.4* 3.7   CHLORIDE 105 106 110* 106   CO2 27 28 25 26   BUN 28 33* 38* 26   CR 1.08 1.08 1.57* 1.00   ANIONGAP 5 5 8 6   CLAUDIA 8.0* 7.8* 7.1* 8.3*   GLC 78 120* 178* 183*   ALBUMIN  --  1.5*  --   --    PROTTOTAL  --  5.3*  --   --    BILITOTAL  --  0.6  --   --    ALKPHOS  --  308*  --  260*   ALT  --  22  --   --    AST  --  55*  --   --        No results found for this or any previous visit (from the past 24 hour(s)).

## 2020-02-06 NOTE — PLAN OF CARE
[id number] AVSS. Pt denied pain/nausea. Using bedside urinal & calls when needing assistance. Right boot still in place on foot. Pt [place], especially with hearing aids out overnight. Pt's 2200 blood sugar check was 82, so gave pt apple juice and crackers & will need a recheck done. Plan to see podiatry tomorrow and then discharge home. Continue to monitor.

## 2020-02-06 NOTE — PLAN OF CARE
Vitals:    02/05/20 1636 02/06/20 0220 02/06/20 0700 02/06/20 1100   BP: 135/53 136/62 (!) 143/62 135/64   BP Location: Right arm Right arm Right arm    Pulse:       Resp: 18 18 18 18   Temp: 98.4  F (36.9  C) 98.3  F (36.8  C) 97.5  F (36.4  C) 97.2  F (36.2  C)   TempSrc: Oral Oral Oral    SpO2: 97% 95% 96% 97%   Weight:       Height:        Afebrile vital stable, sating 97% on room air, denies any pain, non labor breathing,  Discharge script written, teaching has been done, script sent to discharge pharmacy  Extra supply sent with pt, pt is discharged home with wife.

## 2020-02-07 ENCOUNTER — TELEPHONE (OUTPATIENT)
Dept: INTERNAL MEDICINE | Facility: CLINIC | Age: 75
End: 2020-02-07

## 2020-02-07 LAB
BACTERIA SPEC CULT: NORMAL
Lab: NORMAL
SPECIMEN SOURCE: NORMAL

## 2020-02-07 NOTE — PROGRESS NOTES
Patient has clinic visit within 24-48 hours of Discharge so no post DC follow up call is needed    [facility]   Met with patient and spouse Danielle to discuss plans for HC. Patient to be discharged home 2/6/20 and has agreed to have [facility] follow with RN, PT, OT. Patient care support center processing referral. Patient verbalized understanding that initial visit is scheduled for 2/7 or 2/8. Patient has 24 hour phone number for [facility] for any questions or concerns.     Lexii Saldivar RN   [facility] Care Liaison   [phone]

## 2020-02-07 NOTE — PROGRESS NOTES
[facility]   Met with patient and spouse Danielle to discuss plans for HC. Patient to be discharged home 2/6/20 and has agreed to have [facility] follow with RN, PT, OT. Patient care support center processing referral. Patient verbalized understanding that initial visit is scheduled for 2/7 or 2/8. Patient has 24 hour phone number for [facility] for any questions or concerns.    Lexii Saldivar RN   [facility] Liaison   [phone]

## 2020-02-08 ENCOUNTER — TELEPHONE (OUTPATIENT)
Dept: ENDOCRINOLOGY | Facility: CLINIC | Age: 75
End: 2020-02-08

## 2020-02-10 ENCOUNTER — PATIENT OUTREACH (OUTPATIENT)
Dept: CARE COORDINATION | Facility: CLINIC | Age: 75
End: 2020-02-10

## 2020-02-10 ENCOUNTER — DOCUMENTATION ONLY (OUTPATIENT)
Dept: CARE COORDINATION | Facility: CLINIC | Age: 75
End: 2020-02-10

## 2020-02-10 ENCOUNTER — HEALTH MAINTENANCE LETTER (OUTPATIENT)
Age: 75
End: 2020-02-10

## 2020-02-10 ENCOUNTER — MEDICAL CORRESPONDENCE (OUTPATIENT)
Dept: HEALTH INFORMATION MANAGEMENT | Facility: CLINIC | Age: 75
End: 2020-02-10

## 2020-02-10 NOTE — PROGRESS NOTES
[facility] now requests orders and shares plan of care/discharge summaries for some patients through [employer].  Please REPLY TO THIS MESSAGE OR ROUTE BACK TO THE AUTHOR in order to give authorization for orders when needed.  This is considered a verbal order, you will still receive a faxed copy of orders for signature.  Thank you for your assistance in improving collaboration for our patients.    ORDER    SN 1 week 1, 2 week 3, 3 PRN for disease and symptom management, respiratory assessment, diabetic assessment and teaching, wound cares and assessment, teach and assess effects of new and changed medications.  Wound care: Daily rinse right and left foot and right leg with Microklense and pat dry. Ok for minimal weight bearing and Physical therapy with Diabetic shoes and with Arizona brace on right foot. Amlactin cream twice daily as needed to dry areas.     PT to evaluate and treat to include ambulation and transfers with weight bearing restrictions and strengthening, stairs.   OT to evaluate and treat to include cognitive eval, UE strengthening and ROM, adls, and HSE, recommendations on grab bar placement.     Also please note. Patient does not have novolog, his insurance did not cover. So he continues to use humalog at this time.     Alpa Gr RN Admission Clinician  [facility]  [phone]

## 2020-02-11 ENCOUNTER — PRE VISIT (OUTPATIENT)
Dept: INTERNAL MEDICINE | Facility: CLINIC | Age: 75
End: 2020-02-11

## 2020-02-11 DIAGNOSIS — E11.40 TYPE 2 DIABETES, CONTROLLED, WITH NEUROPATHY (H): Primary | ICD-10-CM

## 2020-02-11 NOTE — TELEPHONE ENCOUNTER
[facility]  Dept: [phone]     Patient: Amos Walker   : 1945  MRN: [id number]  Encounter: 20       Pre Visit Assessment    Health Maintenance Due   Topic Date Due     ADVANCE CARE PLANNING  1945     ZOSTER IMMUNIZATION (1 of 2) 1997     MEDICARE ANNUAL WELLNESS VISIT  2012     Chart Reviewed for Labs needed/Health Maintenance: Yes   If labs needed, orders placed:  Yes Fasting Lipid and Urine micro albumin,   Lab appointment scheduled:  N/A    Notes: message sent vis my chart for lab appointment  Patient confirmed they will attend appointment:  N/A  Appointment rescheduled?  N/A      Annel Das at 1:55 PM on 2020        
(0) independent

## 2020-02-11 NOTE — TELEPHONE ENCOUNTER
RN nurse call was made by [facility]  on 2/8/2020 after patient had left the hospital, helping patient with post cares.   However nurse did not put in as clinic care coordination, rather a telephone encounter.      Patient was called for post discharge today.  Patient was also seen by [facility] for an appointment today.      Appointments have been made or sent to the appropriate teams to schedule.     Patient is still in need of the following appointments:  Pulmonary evaluate for  COPD  PCP for hospital follow up in 7 days (there is an appointment previously scheduled for March that will need to be rescheduled)     Patient is in need of:  Evaluation at his home for home safety  Outpatient evaluation by neuropsychiatry

## 2020-02-11 NOTE — TELEPHONE ENCOUNTER
Patient's spouse was reached by phone, but patient was not at home.  RN relayed that hospital follow up appt needs to be scheduled, and Dr. Swift has two openings next Mon 2/17 at either 11:00 AM or 3:30 PM.  Spouse will ask patient to call back to schedule.    Michelle Miller RN on 2/11/2020 at 9:10 AM

## 2020-02-11 NOTE — TELEPHONE ENCOUNTER
Patient called back and appt was scheduled for 2/17/20 @ 11:00 AM with Dr. Swift.    Michelle Miller RN on 2/11/2020 at 2:05 PM

## 2020-02-12 ENCOUNTER — OFFICE VISIT (OUTPATIENT)
Dept: PODIATRY | Facility: CLINIC | Age: 75
End: 2020-02-12
Payer: COMMERCIAL

## 2020-02-12 VITALS — SYSTOLIC BLOOD PRESSURE: 120 MMHG | HEART RATE: 104 BPM | DIASTOLIC BLOOD PRESSURE: 47 MMHG | OXYGEN SATURATION: 97 %

## 2020-02-12 DIAGNOSIS — E11.51 DIABETES MELLITUS WITH PERIPHERAL VASCULAR DISEASE (H): ICD-10-CM

## 2020-02-12 DIAGNOSIS — E11.49 TYPE II OR UNSPECIFIED TYPE DIABETES MELLITUS WITH NEUROLOGICAL MANIFESTATIONS, NOT STATED AS UNCONTROLLED(250.60) (H): Primary | ICD-10-CM

## 2020-02-12 PROCEDURE — 99213 OFFICE O/P EST LOW 20 MIN: CPT | Performed by: PODIATRIST
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
## 2020-02-12 NOTE — PATIENT INSTRUCTIONS
Thanks for coming today.  [facility]  [address] [place] [address]    To schedule future appointments in Ortho Clinic, you may call [phone].    To schedule ordered imaging by your provider:   Call Central Imaging [phone]    To schedule an injection ordered by your provider:  Call Central Imaging Injection scheduling line: [phone]  [email or web address] available online at:  [email or web address]    Please call if any further questions or concerns ([phone]).  Clinic hours 8 am to 5 pm.    Return to clinic (call) if symptoms worsen or fail to improve.
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
## 2020-02-12 NOTE — TELEPHONE ENCOUNTER
RECORDS RECEIVED FROM: internal    DATE RECEIVED: 4.10.20   NOTES STATUS DETAILS   OFFICE NOTE from referring provider Internal 1.29.20 Johny Mccartney   OFFICE NOTE from other specialist N/A    DISCHARGE SUMMARY from hospital N/A    DISCHARGE REPORT from the ER Internal 1.29.20 stephanie    MEDICATION LIST Internal    IMAGING  (NEED IMAGES AND REPORTS)     CT SCAN Internal 1.29.20, 5/11/18,    CHEST XRAY (CXR) Internal 1.29.20, 10.30.19   TESTS     PULMONARY FUNCTION TESTING (PFT) In process 4/10/20- scheduled

## 2020-02-12 NOTE — NURSING NOTE
Amos Walker's chief complaint for this visit includes:  Chief Complaint   Patient presents with     RECHECK     wound check     PCP: Racheal Swift    Referring Provider:  No referring provider defined for this encounter.    /47   Pulse 104   SpO2 97%   Data Unavailable     Do you need any medication refills at today's visit? no

## 2020-02-17 ENCOUNTER — OFFICE VISIT (OUTPATIENT)
Dept: INTERNAL MEDICINE | Facility: CLINIC | Age: 75
End: 2020-02-17
Payer: COMMERCIAL

## 2020-02-17 VITALS
BODY MASS INDEX: 20.81 KG/M2 | HEART RATE: 87 BPM | OXYGEN SATURATION: 98 % | TEMPERATURE: 97.8 F | WEIGHT: 162.1 LBS | SYSTOLIC BLOOD PRESSURE: 126 MMHG | DIASTOLIC BLOOD PRESSURE: 64 MMHG

## 2020-02-17 DIAGNOSIS — L97.912 ULCER OF RIGHT LOWER LEG, WITH FAT LAYER EXPOSED (H): ICD-10-CM

## 2020-02-17 DIAGNOSIS — N18.30 CKD (CHRONIC KIDNEY DISEASE) STAGE 3, GFR 30-59 ML/MIN (H): ICD-10-CM

## 2020-02-17 DIAGNOSIS — I10 ESSENTIAL HYPERTENSION: ICD-10-CM

## 2020-02-17 DIAGNOSIS — E11.51 TYPE 2 DIABETES MELLITUS WITH DIABETIC PERIPHERAL ANGIOPATHY WITHOUT GANGRENE, WITH LONG-TERM CURRENT USE OF INSULIN (H): Primary | ICD-10-CM

## 2020-02-17 DIAGNOSIS — E11.610 CHARCOT FOOT DUE TO DIABETES MELLITUS (H): ICD-10-CM

## 2020-02-17 DIAGNOSIS — D64.9 ANEMIA, UNSPECIFIED TYPE: ICD-10-CM

## 2020-02-17 DIAGNOSIS — Z79.4 TYPE 2 DIABETES MELLITUS WITH DIABETIC PERIPHERAL ANGIOPATHY WITHOUT GANGRENE, WITH LONG-TERM CURRENT USE OF INSULIN (H): Primary | ICD-10-CM

## 2020-02-17 RX ORDER — FERROUS SULFATE 325(65) MG
325 TABLET ORAL
Qty: 100 TABLET | Refills: 3 | Status: SHIPPED | OUTPATIENT
Start: 2020-02-17

## 2020-02-17 RX ORDER — ASCORBIC ACID 500 MG
500 TABLET ORAL DAILY
Qty: 100 TABLET | Refills: 3 | Status: SHIPPED | OUTPATIENT
Start: 2020-02-17

## 2020-02-17 ASSESSMENT — PAIN SCALES - GENERAL: PAINLEVEL: NO PAIN (0)

## 2020-02-17 NOTE — NURSING NOTE
Chief Complaint   Patient presents with     Hospital F/U     pt here following a hospital visit       Annel Das CMA at 11:15 AM on 2/17/2020.

## 2020-02-17 NOTE — PROGRESS NOTES
"[facility]  Primary [facility] [place]   Racheal wSift MD  02/17/2020      Chief Complaint:   Hospital F/U     History of Present Illness:   Amos Walker is a 74 year old adult with a history of tobacco use, type 2 diabetes mellitus, hypertension, chronic kidney disease, peripheral artery disease, charcot foot, and chronic leg ulcers who presents for hospital follow-up.      Hospital Follow-up   He was admitted on 1/29/20 for treatment of sepsis, acute metabolic encephalopathy, acute respiratory failure secondary to Legionella community-acquired pneumonia. The patient fulfilled 2 out of 4 Sirs criteria with a temperature of 101.6 and tachycardia on admission. He required oxygen and was started on IV fluids, ceftriaxone and azithromycin. Urine culture returned positive for Legionella, so he was started on levofloxacin instead. The patient was discharged on 2/7 to complete a total of 10-day course of antibiotic therapy with 1 more day of levofloxacin. He will need repeat CT chest within month of discharge given his tobacco use history. He is seeing pulmonary clinic in April 2020.     Today, he notes having symptoms for 2-3 days and called the paramedics, but he did not go to the hospital given he was afebrile. Two days later, he had a fever of 103 degrees . He was having altered mental status and even fell asleep while on the phone with his son. He feels okay, but is still weak and tired. He thinks his mental status is back to baseline and has a longstanding history of learning disabilities and \"loss for words.\" He was previously on iron supplementation BID, but this was stopped in the hospital. He was having shortness of breath when going to sleep at home following discharge. His nose felt dried out and he was using petroleum jelly, which seemed to help. He has not been exercising and was recommended to keep off his feet given his diabetic foot ulcer. He is not doing PT at home, but saw one in the hospital. He has " home care and OT currently coming out to his home. Denies coughing, chest pain, or shortness of breath.     Type 2 Diabetes   Insulin regimen was adjusted while in the hospital - he is on Lantus 8 units daily and Humalog 4 units with breakfast, 3 units with lunch, 4 units with dinner. He was previously on Lantus 6 units daily and 4 units with breakfast, 3 units with lunch and dinner. If preprandial BG>140, he is taking 5 units of Humalog with meals instead 3-4 units (scale is 1 unit for every 40 >140 mg/dL). He has a bedtime scale but he goes to bed immediately after dinner. He stopped Trulcity as he was not hungry while in the hospital, but is now feeling hungry all the time. He thinks he needs to be careful with snacks but has always been <180 when time to eat. He thinks his blood sugar was >200 once.     He is seeing Dr. Mcclain for follow-up on right foot ulcer every 2 weeks. The wounds are closed but not healed. He is wearing a compression  for wound care.     Hypertension   He has taken BP at home every morning since home from the hospital. It has been <130 systolic and 5-65 diastolic. He stopped taking lisinopril since leaving the hospital, as his was diastolic was low at 45-55.          Routine Health Maintenance  Immunizations:           Most Recent Immunizations   Administered Date(s) Administered     Influenza (High Dose) 3 valent vaccine 10/04/2019     Influenza (IIV3) PF 11/01/2013     Pneumo Conj 13-V (2010&after) 11/03/2014     Pneumococcal 23 valent 12/21/2015     TDAP Vaccine (Boostrix) 03/21/2016         Lipids:               Recent Labs   Lab Test 03/27/19  0934 11/16/18  0915   11/18/14  0853   CHOL 95 100   < > 110   HDL 38* 48   < > 45   LDL 49 42   < > 45   TRIG 40 50   < > 100   CHOLHDLRATIO  --   --   --  2.4    < > = values in this interval not displayed.      PSA (50-75 yrs): No results found for: PSA     AAA Screening (65-75 yrs): CT 12/17, negative  Lung Ca Screening (>30 py  55-79 or >20 py 50-79 + RF): 5/18 5x3 mm nodule, emphysema, CT done in hospital  Colonoscopy (50-75 yrs): 4/18, recommend repeat when off plavix, pending for June and he will hold plavix x 5 days; 6/19 Impression:          - The examined portion of the ileum was normal.                        - One 5 mm polyp in the cecum, removed with a cold snare                        and removed using injection-lift and a cold snare.                        Resected and retrieved.                        - One 6 mm polyp in the transverse colon, removed with a                        hot snare and removed using injection-lift and a hot                        snare. Resected and retrieved. Clip was placed.                        - One 5 mm polyp in the sigmoid colon, removed with a                        hot snare. Resected and retrieved.                        - The examination was otherwise normal on direct and                        retroflexion views.                        NOTE: the polyp reported as being in the descending                        colon in the prior colonoscopy report appears on images                        in the transverse colon; that is where we removed a                        likely SSA. The descending colon was inspected on                        multiple occasions and no polyps were identified.   Recommendation:      - Return to referring physician.                        - Repeat colonoscopy in 5 years for surveillance.                                                                           HIV/HCV if risk factors: nonreactive HepC 6/16  Safety/Lifestyle: reviewed  Tob/EtOH: declines quitting  Depression:   PHQ-2 Score:      PHQ-2 ( 1999 Pfizer) 10/15/2019 7/31/2019   Q1: Little interest or pleasure in doing things 0 0   Q2: Feeling down, depressed or hopeless 1 0   PHQ-2 Score 1 0      Advanced Directive: deferred       Review of Systems:   Pertinent items are noted in HPI or as in patient entered  ROS below, remainder of complete ROS is negative.     Active Medications:      ammonium lactate (LAC-HYDRIN) 12 % cream, Apply topically 2 times daily as needed for dry skin, Disp: 385 g, Rfl: 3     ascorbic acid 500 MG TABS, Take 1 tablet (500 mg) by mouth 2 times daily, Disp: 30 tablet, Rfl:      aspirin 81 MG EC tablet, Take 81 mg by mouth daily, Disp: , Rfl:      clopidogrel (PLAVIX) 75 MG tablet, Take 1 tablet (75 mg) by mouth every evening, Disp: 90 tablet, Rfl: 3     insulin aspart (NOVOLOG PEN) 100 UNIT/ML pen, Inject 1-5 Units Subcutaneous At Bedtime, Disp: 10 mL, Rfl: 0     insulin glargine (LANTUS PEN) 100 UNIT/ML pen, Inject 8 Units Subcutaneous every morning, Disp: 10 mL, Rfl: 0     ketoconazole (NIZORAL) 2 % external shampoo, Apply topically twice a week Leave on for 3-5 min then rinse off; after 2-4 weeks use only once weekly, Disp: 120 mL, Rfl: 3     polyethylene glycol (MIRALAX/GLYCOLAX) powder, Take 17 g (1 capful) by mouth daily, Disp: 255 g, Rfl: 1     simvastatin (ZOCOR) 40 MG tablet, Take 1 tablet (40 mg) by mouth At Bedtime, Disp: 90 tablet, Rfl: 3     VITAMIN D, CHOLECALCIFEROL, PO, Take 1,000 Units by mouth 2 times daily, Disp: , Rfl:      insulin aspart (NOVOLOG PEN) 100 UNIT/ML pen, Inject 2 Units Subcutaneous daily (with breakfast), Disp: 10 mL, Rfl: 0      Allergies:   Lisinopril; Neomycin; Methylchloroisothiazolinone [methylisothiazolinone]; and Povidone iodine      Past Medical History:  Anemia  Coronary artery disease  Chronic kidney disease  Emphysema of lung  Heart disease  Hyperlipidemia  Peripheral artery disease  Type 2 diabetes mellitus  Venous ulcerations  Chronic kidney disease stage 3  Hypertension  Atherosclerosis of native arteries of right leg  Charcot foot due to diabetes mellitus     Past Surgical History:  Angiogram x2  Angioplasty   Left AGUEDA  Colonoscopy x2  Cardiac surgery  Femoral endarterectomy  Cervical disc fusion  Right foot orthopedic surgery  Right lower  extremity stent  Cataracts, bilateral     Family History:   Father: colon cancer, kidney disease  Mother: kidney disease  Son: myocardial infarction  Brother: macular degeneration      Social History:   The patient was alone.   Smoking Status: Current every day smoker, 0.0 PPD for 25 years   Smokeless Tobacco: Never used   Alcohol Use: No   Marital Status:       Physical Exam:   /64 (BP Location: Right arm, Patient Position: Sitting, Cuff Size: Adult Regular)   Pulse 87   Temp 97.8  F (36.6  C) (Oral)   Wt 73.5 kg (162 lb 1.6 oz)   SpO2 98%   BMI 20.81 kg/m       Constitutional: Alert, oriented, pleasant, no acute distress  Head: Normocephalic, atraumatic  Eyes: Extra-ocular movements intact, no scleral icterus  ENT: Oropharynx clear, moist mucus membranes, poor dentition  Neck: Supple, no lymphadenopathy  Cardiovascular: Regular rate and rhythm, no murmurs, rubs or gallops, peripheral pulses full/symmetric  Respiratory: Good air movement bilaterally, lungs clear, no wheezes/rales/rhonchi  Musculoskeletal: No edema, normal muscle tone, normal gait, ambulates with a cane  Neurologic: Alert and oriented, cranial nerves 2-12 intact.  Psychiatric: normal mentation, affect and mood        CT Chest                              IMPRESSION:   1.  Dense consolidative alveolar infiltrate right lower lobe with hazy ill-defined infiltrate posterior aspect right upper lobe and right middle lobe. Findings compatible with pneumonia. Recommend continued CT follow-up to assess for complete resolution   given the dense consolidation as a right infrahilar mass or lesion within the right lower lung cannot be excluded.     2.  Moderate emphysema.     3.  Minimal right basilar pleural fluid.     4.  Advanced atherosclerotic vascular calcification including coronary artery calcification.     5.  Partially visualized 1 cm calculus in the gallbladder lumen.      Assessment and Plan:  Type 2 diabetes mellitus with diabetic  peripheral angiopathy without gangrene, with long-term current use of insulin (H)  Historically well controlled. While in the hospital, his Trulicity was discontinued and his insulin was increased slightly. While he is regaining his trength and appetite, we agreed to hold off on restarting Trulcity, though he would likely benefit from resuming this at our next visit due to cardiovascular benefits.     Essential hypertension  Well controlled currently. He will continue to hold lisinopril until blood pressure increases.     Charcot foot due to diabetes mellitus (H)  Stable. Following with podiatry.     CKD (chronic kidney disease) stage 3, GFR 30-59 ml/min (H)  Stable.     Anemia, unspecified type  I told him it would be okay to reduce iron to once a day. He has an appointment with hematology in the future, though I suspect his anemia is related to chronic disease, given unremarkable endoscopies.   - ferrous sulfate (FEROSUL) 325 (65 Fe) MG tablet  Dispense: 100 tablet; Refill: 3  - ascorbic acid 500 MG TABS  Dispense: 100 tablet; Refill: 3    Ulcer of right lower leg, with fat layer exposed (H)  - ascorbic acid 500 MG TABS  Dispense: 100 tablet; Refill: 3     Follow-up: Scheduled in March 2020         Scribe Disclosure:  I, Karrie Hernandez, am serving as a scribe to document services personally performed by Racheal Swift MD at this visit, based upon the provider's statements to me. All documentation has been reviewed by the aforementioned provider prior to being entered into the official medical record.     Portions of this medical record were completed by a scribe. UPON MY REVIEW AND AUTHENTICATION BY ELECTRONIC SIGNATURE, this confirms (a) I performed the applicable clinical services, and (b) the record is accurate.   Racheal Swift MD  Internal Medicine    To be billed if TCM cannot:  45 min spent face to face, of which >50% time spent on counseling/coordinating care exclusive of any procedure time

## 2020-02-17 NOTE — PATIENT INSTRUCTIONS
[facility] Medication Refill Request Information:  * Please contact your pharmacy regarding ANY request for medication refills.  ** [facility] Prescription Fax = [phone]  * Please allow 3 business days for routine medication refills.  * Please allow 5 business days for controlled substance medication refills.     [facility] Test Result notification information:  *You will be notified with in 7-10 days of your appointment day regarding the results of your test.  If you are on MyChart you will be notified as soon as the provider has reviewed the results and signed off on them.    [facility]: [phone]       Continue to HOLD lisinopril and trulicity until our next appointment. Restart lisinopril 2.5 mg only if blood pressure is above 130/80.

## 2020-02-19 ENCOUNTER — TELEPHONE (OUTPATIENT)
Dept: CARE COORDINATION | Facility: CLINIC | Age: 75
End: 2020-02-19

## 2020-02-19 NOTE — TELEPHONE ENCOUNTER
Dear ,  Medicare Home Health regulations requires [facility] to provide PT EVAL within 10 days. Due to staffing issues we were not able to see him gingzi68 days.      There will be a delay in the PT EVAL Amos Walker; MRN [id number]  We anticipate the PT EVAL will be 2/20/20.    Please respond to this message, Thank you.    Sincerely [facility]  Mary Donaldson RN  [phone]

## 2020-02-20 ENCOUNTER — MEDICAL CORRESPONDENCE (OUTPATIENT)
Dept: HEALTH INFORMATION MANAGEMENT | Facility: CLINIC | Age: 75
End: 2020-02-20

## 2020-02-26 DIAGNOSIS — E11.51 DIABETES MELLITUS WITH PERIPHERAL VASCULAR DISEASE (H): ICD-10-CM

## 2020-02-27 DIAGNOSIS — E11.51 DIABETES MELLITUS WITH PERIPHERAL VASCULAR DISEASE (H): ICD-10-CM

## 2020-02-27 RX ORDER — INSULIN LISPRO 100 [IU]/ML
INJECTION, SOLUTION INTRAVENOUS; SUBCUTANEOUS
Qty: 3 ML | OUTPATIENT
Start: 2020-02-27

## 2020-02-27 NOTE — TELEPHONE ENCOUNTER
HUMALOG 100 U/ML KWIK PEN INJ 3ML  Discontinued 10/31/2019      insulin lispro (HumaLOG PEN) injection 3 Units (Discontinued) 3 Units ONCE 10/31/2019 10/31/2019 --   Admin Instructions: Must be administered 5 min before meal or immediately after.   Class: E-Prescribe   Route: Subcutaneous   Reason for Discontinue: z Rx Scope of Practice   Order: [id number]     Discontinued z Rx Scope of Practice Scope of Practice - No Brandee Rodas, [facility] 10/31/19 1614   Authorizing Provider Ordering Provider Entered by         Celeste Arceo RN  Central Triage Red Flags/Med Refills

## 2020-02-28 ENCOUNTER — OFFICE VISIT (OUTPATIENT)
Dept: PODIATRY | Facility: CLINIC | Age: 75
End: 2020-02-28
Payer: COMMERCIAL

## 2020-02-28 VITALS
TEMPERATURE: 97.7 F | OXYGEN SATURATION: 98 % | DIASTOLIC BLOOD PRESSURE: 80 MMHG | HEART RATE: 99 BPM | SYSTOLIC BLOOD PRESSURE: 159 MMHG

## 2020-02-28 DIAGNOSIS — E11.49 TYPE II OR UNSPECIFIED TYPE DIABETES MELLITUS WITH NEUROLOGICAL MANIFESTATIONS, NOT STATED AS UNCONTROLLED(250.60) (H): Primary | ICD-10-CM

## 2020-02-28 DIAGNOSIS — E11.51 DIABETES MELLITUS WITH PERIPHERAL VASCULAR DISEASE (H): ICD-10-CM

## 2020-02-28 DIAGNOSIS — I87.8 VENOUS STASIS: ICD-10-CM

## 2020-02-28 DIAGNOSIS — E11.610 CHARCOT FOOT DUE TO DIABETES MELLITUS (H): ICD-10-CM

## 2020-02-28 PROCEDURE — 99213 OFFICE O/P EST LOW 20 MIN: CPT | Performed by: PODIATRIST

## 2020-02-28 RX ORDER — INSULIN LISPRO 100 [IU]/ML
INJECTION, SOLUTION INTRAVENOUS; SUBCUTANEOUS
Qty: 3 ML | OUTPATIENT
Start: 2020-02-28

## 2020-02-28 NOTE — NURSING NOTE
Amos Walker's chief complaint for this visit includes:  Chief Complaint   Patient presents with     RECHECK     wound check      PCP: Racheal Swift    Referring Provider:  No referring provider defined for this encounter.    BP (!) 159/80   Pulse 99   SpO2 98%   Data Unavailable     Do you need any medication refills at today's visit? no

## 2020-02-28 NOTE — PATIENT INSTRUCTIONS
Thanks for coming today.  [facility]  [address] [place] [address]    To schedule future appointments in Ortho Clinic, you may call [phone].    To schedule ordered imaging by your provider:   Call Central Imaging [phone]    To schedule an injection ordered by your provider:  Call Central Imaging Injection scheduling line: [phone]  [email or web address] available online at:  [email or web address]    Please call if any further questions or concerns ([phone]).  Clinic hours 8 am to 5 pm.    Return to clinic (call) if symptoms worsen or fail to improve.
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
## 2020-02-28 NOTE — TELEPHONE ENCOUNTER
HUMALOG 100 U/ML KWIK PEN INJ 3ML  Medication Discontinued 10/31/2019      insulin lispro (HumaLOG PEN) injection 3 Units (Discontinued) 3 Units ONCE 10/31/2019 10/31/2019 --   Admin Instructions: Must be administered 5 min before meal or immediately after.   Class: E-Prescribe   Route: Subcutaneous   Reason for Discontinue: z Rx Scope of Practice   Order: [id number]     Discontinued z Rx Scope of Practice Scope of Practice - No Brandee Rodas, [facility] 10/31/19 1614   Authorizing Provider Ordering Provider Entered by     Celeste Arceo RN  Central Triage Red Flags/Med Refills

## 2020-03-04 ENCOUNTER — TELEPHONE (OUTPATIENT)
Dept: INTERNAL MEDICINE | Facility: CLINIC | Age: 75
End: 2020-03-04

## 2020-03-04 NOTE — TELEPHONE ENCOUNTER
Called kayla at [facility] left a VM for verbal approval of orders as written below Deja Bueno Paramedic on 3/4/2020 at 10:51 AM

## 2020-03-04 NOTE — TELEPHONE ENCOUNTER
[facility] Call Center    Phone Message    May a detailed message be left on voicemail: yes , Ciera is wanting to get a call back at confidential line and left a detailed message.     Reason for Call: Order(s): Home Care Orders: Skilled Nursing:  Extension , 1x a week for 2 weeks, and one as needed for assessment and wound care,  Left shin, Skin tear,  Apply dressing to wound, same treatment to area, done every other day    Action Taken: Message routed to:  [facility]    Travel Screening: Not Applicable

## 2020-03-10 ENCOUNTER — TELEPHONE (OUTPATIENT)
Dept: INTERNAL MEDICINE | Facility: CLINIC | Age: 75
End: 2020-03-10

## 2020-03-10 NOTE — TELEPHONE ENCOUNTER
Called Ciera at [facility] gave verbal approval for orders as written below Deja Bueno Paramedic on 3/10/2020 at 2:55 PM '

## 2020-03-10 NOTE — TELEPHONE ENCOUNTER
[facility] Call Center    Phone Message    May a detailed message be left on voicemail: yes     Reason for Call: Order(s): Home Care Orders: Skilled Nursing:  Extend SN to 04/09/2020 - Pt won't be discharging    Action Taken: Message routed to:  [facility]    Travel Screening: Not Applicable

## 2020-03-13 ENCOUNTER — OFFICE VISIT (OUTPATIENT)
Dept: PODIATRY | Facility: CLINIC | Age: 75
End: 2020-03-13
Payer: COMMERCIAL

## 2020-03-13 VITALS
DIASTOLIC BLOOD PRESSURE: 52 MMHG | TEMPERATURE: 98.1 F | OXYGEN SATURATION: 97 % | HEART RATE: 95 BPM | SYSTOLIC BLOOD PRESSURE: 143 MMHG

## 2020-03-13 DIAGNOSIS — E11.49 TYPE II OR UNSPECIFIED TYPE DIABETES MELLITUS WITH NEUROLOGICAL MANIFESTATIONS, NOT STATED AS UNCONTROLLED(250.60) (H): Primary | ICD-10-CM

## 2020-03-13 DIAGNOSIS — E11.51 DIABETES MELLITUS WITH PERIPHERAL VASCULAR DISEASE (H): ICD-10-CM

## 2020-03-13 DIAGNOSIS — E11.610 CHARCOT FOOT DUE TO DIABETES MELLITUS (H): ICD-10-CM

## 2020-03-13 PROCEDURE — 99213 OFFICE O/P EST LOW 20 MIN: CPT | Performed by: PODIATRIST
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
## 2020-03-13 NOTE — NURSING NOTE
Amos Walker's chief complaint for this visit includes:  Chief Complaint   Patient presents with     Right Foot - Follow Up     Left Foot - Follow Up     Rash on left and right leg, getting better.  PCP: Racheal Swift    Referring Provider:  No referring provider defined for this encounter.    BP (!) 143/52   Pulse 95   Temp 98.1  F (36.7  C)   SpO2 97%   Data Unavailable     Do you need any medication refills at today's visit? No.
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
## 2020-03-16 ENCOUNTER — MEDICAL CORRESPONDENCE (OUTPATIENT)
Dept: HEALTH INFORMATION MANAGEMENT | Facility: CLINIC | Age: 75
End: 2020-03-16

## 2020-03-16 ENCOUNTER — PATIENT OUTREACH (OUTPATIENT)
Dept: GASTROENTEROLOGY | Facility: CLINIC | Age: 75
End: 2020-03-16

## 2020-03-16 NOTE — PROGRESS NOTES
Patient called in and requested that his appointment be moved to April due to the COVID-19.  Appointment moved to April 7th.

## 2020-03-19 ENCOUNTER — PRE VISIT (OUTPATIENT)
Dept: GASTROENTEROLOGY | Facility: CLINIC | Age: 75
End: 2020-03-19

## 2020-03-19 DIAGNOSIS — E11.51 DIABETES MELLITUS WITH PERIPHERAL VASCULAR DISEASE (H): ICD-10-CM

## 2020-03-19 NOTE — TELEPHONE ENCOUNTER
insulin lispro (HUMALOG PEN) 100 UNIT/ML pen    Last Written Prescription Date:  9/4/19  Last Fill Quantity: 3ml,   # refills: 3  Last Office Visit : 2/17/20  Future Office visit: 3/23/20      Routing refill request to provider for review/approval because: med end date 2/6/20

## 2020-03-20 RX ORDER — INSULIN LISPRO 100 [IU]/ML
INJECTION, SOLUTION INTRAVENOUS; SUBCUTANEOUS
Qty: 15 ML | Refills: 0 | Status: SHIPPED | OUTPATIENT
Start: 2020-03-20 | End: 2020-03-23

## 2020-03-23 ENCOUNTER — TELEPHONE (OUTPATIENT)
Dept: INTERNAL MEDICINE | Facility: CLINIC | Age: 75
End: 2020-03-23

## 2020-03-23 ENCOUNTER — VIRTUAL VISIT (OUTPATIENT)
Dept: INTERNAL MEDICINE | Facility: CLINIC | Age: 75
End: 2020-03-23
Payer: COMMERCIAL

## 2020-03-23 ENCOUNTER — TELEPHONE (OUTPATIENT)
Dept: GASTROENTEROLOGY | Facility: CLINIC | Age: 75
End: 2020-03-23

## 2020-03-23 ENCOUNTER — MYC MEDICAL ADVICE (OUTPATIENT)
Dept: INTERNAL MEDICINE | Facility: CLINIC | Age: 75
End: 2020-03-23

## 2020-03-23 VITALS — DIASTOLIC BLOOD PRESSURE: 60 MMHG | SYSTOLIC BLOOD PRESSURE: 112 MMHG

## 2020-03-23 DIAGNOSIS — E11.40 TYPE 2 DIABETES, CONTROLLED, WITH NEUROPATHY (H): Primary | ICD-10-CM

## 2020-03-23 DIAGNOSIS — E11.610 CHARCOT FOOT DUE TO DIABETES MELLITUS (H): ICD-10-CM

## 2020-03-23 DIAGNOSIS — I10 BENIGN ESSENTIAL HYPERTENSION: ICD-10-CM

## 2020-03-23 DIAGNOSIS — E11.51 DIABETES MELLITUS WITH PERIPHERAL VASCULAR DISEASE (H): ICD-10-CM

## 2020-03-23 RX ORDER — DULAGLUTIDE 1.5 MG/.5ML
1.5 INJECTION, SOLUTION SUBCUTANEOUS
Qty: 2 ML | Refills: 3 | Status: SHIPPED | OUTPATIENT
Start: 2020-03-23 | End: 2020-05-20

## 2020-03-23 RX ORDER — INSULIN LISPRO 100 [IU]/ML
INJECTION, SOLUTION INTRAVENOUS; SUBCUTANEOUS
Qty: 15 ML | Refills: 0 | Status: SHIPPED | OUTPATIENT
Start: 2020-03-23 | End: 2021-03-02

## 2020-03-23 ASSESSMENT — PAIN SCALES - GENERAL: PAINLEVEL: NO PAIN (0)

## 2020-03-23 NOTE — TELEPHONE ENCOUNTER
[facility] Call Center    Phone Message    May a detailed message be left on voicemail: yes     Reason for Call: Other: Patient called and he wants to reschedule his 4/7/20 Jacob Anthony appt to late May or early June; however, writer could note find any appts at all.  Please follow up with patient to reschedule.  Thank you!     Action Taken: Message routed to:  [facility]    Travel Screening: Not Applicable

## 2020-03-23 NOTE — TELEPHONE ENCOUNTER
ONCOLOGY INTAKE: Records Information      APPT INFORMATION:  Referring provider:  Dr. Swift  Referring provider s clinic:   Primary Care  Reason for visit/diagnosis:  Anemia  Has patient been notified of appointment date and time?: Yes.    RECORDS INFORMATION:  Were the records received with the referral (via [employer])? No, interneal.    Has patient been seen for any external appt for this diagnosis? No.    If yes, where? N/A    Has patient had any imaging or procedures outside of [facility] for this condition? No.      If Yes, where? N/A    ADDITIONAL INFORMATION:  None.

## 2020-03-23 NOTE — PROGRESS NOTES
"Amos Walker is a 74 year old adult who is being evaluated via a billable telephone visit.      The patient has been notified of following:     \"This telephone visit will be conducted via a call between you and your physician/provider. We have found that certain health care needs can be provided without the need for a physical exam.  This service lets us provide the care you need with a short phone conversation.  If a prescription is necessary we can send it directly to your pharmacy.  If lab work is needed we can place an order for that and you can then stop by our lab to have the test done at a later time.    If during the course of the call the physician/provider feels a telephone visit is not appropriate, you will not be charged for this service.\"     Amos Walker complains of    Chief Complaint   Patient presents with     Recheck Medication     pt would like to discuss novolog/humalog and trulicity     Diabetes     pt would like to discuss sugar levels     Hypertension     pt would like to discuss bp     Wounds     pt would like to discuss wounds on foot     Patient feeling okay. Trying to self-isolate given covid illness.       He has been using novolog instead of humalog given change in the hospital.  He takes  novolog 4/3/4 + extra prn.   Taking 8 of lantus --> increased to 9 recently. Has not been on trulicity.    Foot healing, able to WBAT 60 min per episode.  No open wounds.    BP at home has been: 112/50  111/60   130/64  107/61.  Lisinopril is on hold will keep checking.      I have reviewed and updated the patient's Past Medical History, Social History, Family History and Medication List.    ALLERGIES  Lisinopril; Neomycin; Methylchloroisothiazolinone [methylisothiazolinone]; and Povidone iodine    Assessment/Plan:    Amos was seen today for recheck medication, diabetes, hypertension and wounds.    Diagnoses and all orders for this visit:    Type 2 diabetes, controlled, with neuropathy (H)  Last " A1c is stable. I agree with using whichever insulin is affordable. Will resume trulicity with titration of lantus.    Charcot foot due to diabetes mellitus (H)  Stable.    Benign essential hypertension  Stable, continue to hold lisinopril for now.  Okay to defer non-essential appts for now.        Phone call duration: 19 minutes    1204 12:23    Racheal Swift MD  Internal Medicine

## 2020-03-24 NOTE — TELEPHONE ENCOUNTER
RECORDS STATUS - ALL OTHER DIAGNOSIS      RECORDS RECEIVED FROM: [facility] - Internal Referral   DATE RECEIVED: 3/24/20   NOTES STATUS DETAILS   OFFICE NOTE from referring provider Epic Racheal Swift MD: via 7/11/19 Telephone Enc, most recent OV (virtual visit) - 3/23/20   OFFICE NOTE from medical oncologist     DISCHARGE SUMMARY from hospital     DISCHARGE REPORT from the ER     OPERATIVE REPORT Epic 6/12/19, 4/18/18: Colonoscopy   MEDICATION LIST [facility] 3/23/20   CLINICAL TRIAL TREATMENTS TO DATE     LABS     PATHOLOGY REPORTS [facility] 6/12/19   ANYTHING RELATED TO DIAGNOSIS Epic 2/5/20   GENONOMIC TESTING     TYPE:     IMAGING (NEED IMAGES & REPORT)     CT SCANS PACS 1/29/20   MRI PACS 1/31/20   MAMMO     ULTRASOUND     PET

## 2020-04-02 ENCOUNTER — MEDICAL CORRESPONDENCE (OUTPATIENT)
Dept: HEALTH INFORMATION MANAGEMENT | Facility: CLINIC | Age: 75
End: 2020-04-02

## 2020-04-07 ENCOUNTER — MEDICAL CORRESPONDENCE (OUTPATIENT)
Dept: HEALTH INFORMATION MANAGEMENT | Facility: CLINIC | Age: 75
End: 2020-04-07

## 2020-04-07 ENCOUNTER — VIRTUAL VISIT (OUTPATIENT)
Dept: GASTROENTEROLOGY | Facility: CLINIC | Age: 75
End: 2020-04-07
Attending: INTERNAL MEDICINE
Payer: COMMERCIAL

## 2020-04-07 DIAGNOSIS — Z87.19 HISTORY OF ISCHEMIC COLITIS: Primary | ICD-10-CM

## 2020-04-07 RX ORDER — SILVER SULFADIAZINE 1 %
CREAM (GRAM) TOPICAL
COMMUNITY
Start: 2020-03-30 | End: 2020-08-18

## 2020-04-07 ASSESSMENT — PAIN SCALES - GENERAL: PAINLEVEL: NO PAIN (0)

## 2020-04-07 NOTE — PATIENT INSTRUCTIONS
It was a pleasure taking care of you today.  I've included a brief summary of our discussion and care plan from today's visit below.  Please review this information with your primary care provider.  ______________________________________________________________________    My recommendations are summarized as follows:    -- Recommend a trial of Miralax.  This is not a stimulant laxative and is safe to take on a daily basis.  Try 1 cap(s) full every day.  You can titrate this dose (increase or decrease) based on stool frequency and consistency.    -- Call primary or our office if you have recurrence of blood in the stool.    Return to GI Clinic as needed to review your progress.    ______________________________________________________________________    Who do I call with any questions after my visit?  Please be in touch if there are any further questions that arise following today's visit.  There are multiple ways to contact your gastroenterology care team.        During business hours, you may reach a Gastroenterology nurse at [phone], option 3.       To schedule or reschedule an appointment, please call [phone].       You can always send a secure message through Chu Shu.  Chu Shu messages are answered by your nurse or doctor typically within 24 hours.  Please allow extra time on weekends and holidays.        For urgent/emergent questions after business hours, you may reach the on-call GI Fellow by contacting the [facility]  at [phone].      In order for your refill to be processed in a timely fashion, it is your responsibility to ensure you follow the recommendations from your provider regarding your laboratory studies and follow up appointments.       How will I get the results of any tests ordered?    You will receive all of your results.  If you have signed up for [employer]u, any tests ordered at your visit will be available to you after your physician reviews them.  Typically  this takes 1-2 weeks.  If there are urgent results that require a change in your care plan, your physician or nurse will call you to discuss the next steps.      What is ideaForgehart?  StyleSeat is a secure way for you to access all of your healthcare records from the [facility].  It is a web based computer program, so you can sign on to it from any location.  It also allows you to send secure messages to your care team.  I recommend signing up for StyleSeat access if you have not already done so and are comfortable with using a computer.      How to I schedule a follow-up visit?  If you did not schedule a follow-up visit today, please call [phone] to schedule a follow-up office visit.        Sincerely,    Jacob Anthony PA-C  [facility]  Division of Gastroenterology
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
## 2020-04-07 NOTE — NURSING NOTE
Chief Complaint   Patient presents with     Consult     new BNT ischemic colitis       There were no vitals filed for this visit.    There is no height or weight on file to calculate BMI.    Enzo Walton, EMT

## 2020-04-10 ENCOUNTER — PRE VISIT (OUTPATIENT)
Dept: PULMONOLOGY | Facility: CLINIC | Age: 75
End: 2020-04-10

## 2020-04-14 ENCOUNTER — TELEPHONE (OUTPATIENT)
Dept: PODIATRY | Facility: CLINIC | Age: 75
End: 2020-04-14

## 2020-04-14 NOTE — TELEPHONE ENCOUNTER
Attempted to reach patient to see how his right leg wounds are doing.  If doing well, it is ok to push out appointment.  If he would like to keep appointment need to review symptoms questions and visitor policy.  Left message for patient to return call.     Do you have any of the following symptoms:  a)      Fever (or reported chills) No  b)      Shortness of Breath No  c)      Rash No   D)     Cough:     If a patient reports yes to any of these symptoms, obtain direction from the provider and call the patient back to let them know if they can come in or not.  1.    Provider needs to determine if this patient should still be seen in clinic.  2.    If decision to not see in clinic, call patient back and refer them to COVID- 19 [email or web address] or schedule COVID-19 phone visit.  3.    Turn in-person visit into telephone visit (FOR RETURN PATIENTS ONLY)    Remind patients that visitors are not allowed on site. Only one legal guardian who screens negative to the above questions will be allowed to accompany patients. If a patient indicates that they will be bringing a legal guardian with them to the appointment please make sure to screen both the patient and the legal guardian for symptoms using the tool above.

## 2020-04-15 ENCOUNTER — OFFICE VISIT (OUTPATIENT)
Dept: PODIATRY | Facility: CLINIC | Age: 75
End: 2020-04-15
Payer: COMMERCIAL

## 2020-04-15 VITALS — BODY MASS INDEX: 20.8 KG/M2 | OXYGEN SATURATION: 98 % | WEIGHT: 162 LBS

## 2020-04-15 DIAGNOSIS — L97.521 SKIN ULCER OF LEFT FOOT, LIMITED TO BREAKDOWN OF SKIN (H): ICD-10-CM

## 2020-04-15 DIAGNOSIS — E11.49 TYPE II OR UNSPECIFIED TYPE DIABETES MELLITUS WITH NEUROLOGICAL MANIFESTATIONS, NOT STATED AS UNCONTROLLED(250.60) (H): Primary | ICD-10-CM

## 2020-04-15 DIAGNOSIS — E11.610 CHARCOT FOOT DUE TO DIABETES MELLITUS (H): ICD-10-CM

## 2020-04-15 DIAGNOSIS — E11.51 DIABETES MELLITUS WITH PERIPHERAL VASCULAR DISEASE (H): ICD-10-CM

## 2020-04-15 DIAGNOSIS — I87.8 VENOUS STASIS: ICD-10-CM

## 2020-04-15 PROCEDURE — 99213 OFFICE O/P EST LOW 20 MIN: CPT | Performed by: PODIATRIST
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
## 2020-04-15 NOTE — PATIENT INSTRUCTIONS
Thanks for coming today.  [facility]  [address] [place] [address]    To schedule future appointments in Ortho Clinic, you may call [phone].    To schedule ordered imaging by your provider:   Call [facility] [phone]    To schedule an injection ordered by your provider:  Call [facility] Injection scheduling line: [phone]  [facility] available online at:  [email or web address]    Please call if any further questions or concerns ([phone]).  Clinic hours 8 am to 5 pm.    Return to clinic (call) if symptoms worsen or fail to improve.

## 2020-04-20 ENCOUNTER — VIRTUAL VISIT (OUTPATIENT)
Dept: INTERNAL MEDICINE | Facility: CLINIC | Age: 75
End: 2020-04-20
Payer: COMMERCIAL

## 2020-04-20 DIAGNOSIS — E11.40 TYPE 2 DIABETES, CONTROLLED, WITH NEUROPATHY (H): ICD-10-CM

## 2020-04-20 DIAGNOSIS — N18.30 CKD (CHRONIC KIDNEY DISEASE) STAGE 3, GFR 30-59 ML/MIN (H): ICD-10-CM

## 2020-04-20 DIAGNOSIS — I10 ESSENTIAL HYPERTENSION: Primary | ICD-10-CM

## 2020-04-20 ASSESSMENT — PAIN SCALES - GENERAL: PAINLEVEL: NO PAIN (0)
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
## 2020-04-20 NOTE — NURSING NOTE
Chief Complaint   Patient presents with     Recheck Medication     pt would like follow up       Bee Valdez CMA, EMT at 12:17 PM on 4/20/2020.

## 2020-04-22 ENCOUNTER — MEDICAL CORRESPONDENCE (OUTPATIENT)
Dept: HEALTH INFORMATION MANAGEMENT | Facility: CLINIC | Age: 75
End: 2020-04-22

## 2020-04-22 ENCOUNTER — VIRTUAL VISIT (OUTPATIENT)
Dept: TRANSPLANT | Facility: CLINIC | Age: 75
End: 2020-04-22
Attending: INTERNAL MEDICINE
Payer: COMMERCIAL

## 2020-04-22 DIAGNOSIS — D50.0 IRON DEFICIENCY ANEMIA DUE TO CHRONIC BLOOD LOSS: ICD-10-CM

## 2020-04-22 DIAGNOSIS — N18.30 CKD (CHRONIC KIDNEY DISEASE) STAGE 3, GFR 30-59 ML/MIN (H): Primary | ICD-10-CM

## 2020-04-22 DIAGNOSIS — D47.2 MGUS (MONOCLONAL GAMMOPATHY OF UNKNOWN SIGNIFICANCE): ICD-10-CM

## 2020-04-22 NOTE — PROGRESS NOTES
"Amos Walker is a 74 year old adult who is being evaluated via a billable video visit.      The patient has been notified of following:     \"This video visit will be conducted via a call between you and your physician/provider. We have found that certain health care needs can be provided without the need for an in-person physical exam.  This service lets us provide the care you need with a video conversation.  If a prescription is necessary we can send it directly to your pharmacy.  If lab work is needed we can place an order for that and you can then stop by our lab to have the test done at a later time.    Video visits are billed at different rates depending on your insurance coverage.  Please reach out to your insurance provider with any questions.    If during the course of the call the physician/provider feels a video visit is not appropriate, you will not be charged for this service.\"    *Pt has no concerns today and no needs.  Jazmine Drake CMA on 4/22/2020 at 3:05 PM  [phone]      Patient has given verbal consent for Video visit? Yes      How would you like to obtain your AVS? [facility]    Patient would like the video invitation sent by: Send to e-mail at: [email or web address]     Send video invite to [email or web address]  # [phone]    Will anyone else be joining your video visit?         Video-Visit Details/TELEPHONE STARTED VIDEO LOST CONNECTION FINISHED ON TELEPHONE    Type of service:  Video Visit    Video Start [id number]  Telephone End Time: 440    Originating Location (pt. Location): [facility]  [place] [facility]    Distant Location (provider location):  [facility] AND [facility]ARR[facility]ANS[facility]     Mode of Communication:  Video Conference via [employer]  HISTORY OF PRESENT ILLNESS:  I started this as a video visit; however, there were technical issues with the video, the patient's computer did not seem to be responding and then I lost his image and I continued it as a telephone visit.  Mr." Denise is a 74-year-old gentleman referred for evaluation of anemia and monoclonal gammopathy.  He has a complicated medical history highlighted by type 2 diabetes, hypertension, coronary and peripheral artery disease, Charcot foot, chronic leg ulcers, chronic kidney disease and history of heavy tobacco use.  He has been noted to be anemic for quite a while.  He tells me that he was anemic several years ago when he was in [place].  He had some blood in the stool.  He did have colonoscopies done.  In reviewing his chart, he has had hemoglobins in the low 9/high 8 range on and off since 2017.  Occasionally his hemoglobin gets a little bit higher.  His most recent hemoglobin on 02/05 was 9.0.  He denies any active bleeding now.  He does have foot ulcers.  He has had a lot of problems with infections.  He has had a history of MRSA involving his foot which he feels led ultimately to his Charcot foot and his diabetic ulcers.  He has not had a history of B12 deficiency.  He was noted in 2019 to have a monoclonal peak of 0.2.  There were no light chains in the urine.  His immunoglobulin levels were elevated in IgA and IgM.  His IgA was 604 and IgG was 1890.  There is an immunofixation of the monoclonal peak, which showed a free light chain and kappa chain type in his serum and an IgG kappa also in the serum.  He had a rather severe infection in 01/2020 with sepsis, acute metabolic encephalopathy, respiratory failure secondary to Legionella community-acquired pneumonia.  He was treated aggressively with antibiotics and ultimately was discharged.  He has had issues with type 2 diabetes, on Lantus and Humalog.  He also has had hypertension.  He sees Dr. Mcclain regularly for his right foot ulcer about every 2 weeks.  He has a Charcot foot.  He denies any fever or chills.  He denies any nausea, vomiting or diarrhea.      PAST MEDICAL HISTORY:  Remarkable for coronary artery disease, chronic kidney disease, emphysema, heart  disease, hyperlipidemia, peripheral artery disease, type 2 diabetes, venous ulcerations, chronic kidney disease, hypertension, atherosclerosis of native arteries of right leg, Charcot foot due to diabetes, angioplasty, a left total hip replacement, a femoral endarterectomy on the right, a cervical disk fusion, a right foot orthopedic surgery, and bilateral cataract surgery.      FAMILY HISTORY:  Remarkable for his father having colon cancer and kidney disease.  Mother had kidney and cardiac issues.  His son had a myocardial infarction at age 40.  His brother has macular degeneration.      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  He smokes 2 packs per day for at least 25 years.  No alcohol use.  He is .  He has 4 children.      MEDICATIONS:  See EMR.      REVIEW OF SYSTEMS:   EYES:  Vision is much better since cataract surgery.   EARS:  Hearing is okay.   RESPIRATORY:  He has a chronic cough.  He feels that he has some postnasal drip.   CARDIAC:  No angina.   GASTROINTESTINAL:  No GERD.  No constipation, no melena, no hematochezia.   GENITOURINARY:  He has ED.  He does not have much in the way of nocturia.   NEUROLOGIC:  He has peripheral neuropathy.  No seizures or stroke.   MUSCULOSKELETAL:  He has the Charcot joint with Charcot foot.   PSYCHIATRIC:  His affect is flat.  He is scheduled to see a psychologist.      ALLERGIES:  Lisinopril, neomycin, methylchloroisothiazolinone, povidone-iodine.      PHYSICAL EXAMINATION:  Telephone interview.      IMPORTANT LABORATORY DATA:  Included on 07/10/2019 a monoclonal protein of 0.2 of IgG lambda and free lambda light chains, IgA of 604, an IgG of 1890 and IgM of 20.  A recent hemoglobin showed a hemoglobin of 9.0, hematocrit 27, white count 6.6 with a normal diff., platelet count 229,000.  Blood smear showed a slight poikilocytosis, a normal differential.  His absolute neutrophil count was 5.7, absolute lymphs was 0.2.  His creatinine on 02/05 was  1.08, calcium 8.0.      ASSESSMENT:   1.  Chronic anemia, likely anemia of chronic inflammation.   2.  Monoclonal gammopathy of uncertain significance.   3.  Type 2 diabetes.   4.  Coronary artery disease.   5.  Peripheral artery disease.   6.  Charcot foot.  7.Smoking      PLAN:  I was talking with Mr. Walker, I do think that it is possible that his anemia is related to chronic inflammation, possibly a low erythropoietin.  I do not think he has any significant bleeding and could he have a bone marrow neoplastic process - I do not think so.  I will get a blood smear.  He does have a monoclonal gammopathy, which I believe will be a monoclonal gammopathy of uncertain significance.  He does not have hypercalcemia.  He does have mild anemia and abnormal renal function.  At this time, I do not think I would do a bone marrow.  I will repeat the [facility] immunoglobulin tests and serum free light chains.  He did have examination of his urine for free light chains and none was found.  His iron level at one time was elevated.  I will check it again because he does have a history of blood in his stool.  I would like to also consider down the line a possible bone marrow biopsy if there is an elevation in the monoclonal protein.  One could also consider doing a skeletal survey, but I think I will hold off on this at this time.  An erythropoietin level would be most informative.  I have seen many diabetics like this who have a hyperproliferative anemia related to renal dysfunction causing decreased erythropoietin synthesis.  He also could have a low testosterone.  He does have ED which is more likely related to his peripheral vascular disease. I emphasized he MUST stop smoking with his DM and PAD.     I would like to thank Dr. Swift for referring this gentleman to the Hematology Clinic at the [facility].      Kaleb Ramirez MD           I spent a total of 60 minutes face to face with Amos DALEY Denise during today's  office visit. Over 50% of this time was spent counseling the patient and coordinating the care regarding anemia and   Kaleb Ramirez MD

## 2020-04-28 ENCOUNTER — MEDICAL CORRESPONDENCE (OUTPATIENT)
Dept: HEALTH INFORMATION MANAGEMENT | Facility: CLINIC | Age: 75
End: 2020-04-28

## 2020-05-06 ENCOUNTER — VIRTUAL VISIT (OUTPATIENT)
Dept: NEUROPSYCHOLOGY | Facility: CLINIC | Age: 75
End: 2020-05-06
Payer: COMMERCIAL

## 2020-05-06 DIAGNOSIS — G93.40 ENCEPHALOPATHY, UNSPECIFIED: ICD-10-CM

## 2020-05-06 DIAGNOSIS — F43.21 ADJUSTMENT DISORDER WITH DEPRESSED MOOD: ICD-10-CM

## 2020-05-06 DIAGNOSIS — R41.3 MEMORY LOSS: Primary | ICD-10-CM
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
## 2020-05-06 NOTE — PROGRESS NOTES
The patient was seen for neuropsychological evaluation via telehealth at the request of Dr. Washington for the purposes of diagnostic clarification and treatment planning.  191 minutes of video test administration and scoring were provided by this writer.  Please see Dr. Edmund Marmolejo's report for a full interpretation of the findings.    Video Call 1 Start Time: 8:02am  Video Call 1 End Time: 8:05am    Video Call 2 Start Time: 8:49am  Video Call 2 End Time: 11:07am    Tierra Mclain  Psychometrist
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
## 2020-05-07 ENCOUNTER — PRE VISIT (OUTPATIENT)
Dept: ONCOLOGY | Facility: CLINIC | Age: 75
End: 2020-05-07

## 2020-05-13 ENCOUNTER — OFFICE VISIT (OUTPATIENT)
Dept: PODIATRY | Facility: CLINIC | Age: 75
End: 2020-05-13
Payer: COMMERCIAL

## 2020-05-13 VITALS
WEIGHT: 162 LBS | BODY MASS INDEX: 20.8 KG/M2 | DIASTOLIC BLOOD PRESSURE: 78 MMHG | SYSTOLIC BLOOD PRESSURE: 156 MMHG | HEART RATE: 105 BPM

## 2020-05-13 DIAGNOSIS — L97.322 SKIN ULCER OF LEFT ANKLE WITH FAT LAYER EXPOSED (H): ICD-10-CM

## 2020-05-13 DIAGNOSIS — I87.8 VENOUS STASIS: ICD-10-CM

## 2020-05-13 DIAGNOSIS — E11.49 TYPE II OR UNSPECIFIED TYPE DIABETES MELLITUS WITH NEUROLOGICAL MANIFESTATIONS, NOT STATED AS UNCONTROLLED(250.60) (H): Primary | ICD-10-CM

## 2020-05-13 DIAGNOSIS — E11.610 CHARCOT FOOT DUE TO DIABETES MELLITUS (H): ICD-10-CM

## 2020-05-13 DIAGNOSIS — E11.51 DIABETES MELLITUS WITH PERIPHERAL VASCULAR DISEASE (H): ICD-10-CM

## 2020-05-13 DIAGNOSIS — L97.511 SKIN ULCER OF RIGHT FOOT, LIMITED TO BREAKDOWN OF SKIN (H): ICD-10-CM

## 2020-05-13 PROCEDURE — 99213 OFFICE O/P EST LOW 20 MIN: CPT | Performed by: PODIATRIST

## 2020-05-13 RX ORDER — AZITHROMYCIN 250 MG/1
TABLET, FILM COATED ORAL
Qty: 6 TABLET | Refills: 0 | Status: SHIPPED | OUTPATIENT
Start: 2020-05-13 | End: 2020-06-03

## 2020-05-13 RX ORDER — GENTAMICIN SULFATE 1 MG/G
CREAM TOPICAL 3 TIMES DAILY
Qty: 60 G | Refills: 2 | Status: SHIPPED | OUTPATIENT
Start: 2020-05-13 | End: 2020-09-17

## 2020-05-13 RX ORDER — TRIAMCINOLONE ACETONIDE 1 MG/G
CREAM TOPICAL DAILY
Qty: 45 G | Refills: 1 | Status: SHIPPED | OUTPATIENT
Start: 2020-05-13 | End: 2022-05-10

## 2020-05-13 RX ORDER — TRIAMCINOLONE ACETONIDE 1 MG/G
CREAM TOPICAL 2 TIMES DAILY
Qty: 45 G | Refills: 1 | Status: SHIPPED | OUTPATIENT
Start: 2020-05-13 | End: 2020-05-13
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
## 2020-05-13 NOTE — PATIENT INSTRUCTIONS
Thanks for coming today.  [facility]  [address] [place] [address]    To schedule future appointments in Ortho Clinic, you may call [phone].    To schedule ordered imaging by your provider:   Call [facility] [phone]    To schedule an injection ordered by your provider:  Call [facility] Injection scheduling line: [phone]  Next Callerhart available online at:  [email or web address]    Please call if any further questions or concerns ([phone]).  Clinic hours 8 am to 5 pm.    Return to clinic (call) if symptoms worsen or fail to improve.
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
## 2020-05-13 NOTE — PROGRESS NOTES
Face Sheet          Patient:  Amos Walker MRN:  [id number] :  [id number] KHALIL:  20   Education 17 Handedness:   Provider DW Psychometrist:  NN   Orientation  WRAT-4  WAIS-IV Raw SS RDS   Personal Info 4 Raw 58 Digit Span 20 8 6   Place 2 SS 95 Vocabulary 44 12 ([place])   Time 0 %ile 37 Matrix Reasoning 19 14    Presidents 6 Grade Equivalence 11.6 Similarities 26 11    BNT-15  COWAT - FAS  Animal Fluency      Raw 15 Raw 34 Raw 23     z 0.90 SS 9 SS 12     Total Stim Correct 0 T 45 T 60     Total Phonemic Correct 0         Complex Ideational Material  Clock Drawing  RBANS Line Orientation      Raw 9 Command Invalid? Raw 16     SS 5 Copy 2 z 0.14     T 22   %ile 26-50     Oral Trails    TSAT      Trails A 8 Z 3.25 Total time 115 Z -0.56   Trails B 38 Z 0.39 Total Errors 3 Z -0.28              Trial 1 5   T-Score   T-Score   Trial 2 8 Total Recall 22 46 True Positives 9    Trial 3 9 Delayed Recall 8 48 False Positives 1    Learning 4 Percent Retention 89% 51 Discrim. Index 8 36   RBANS Story           Total Immediate 20 z Score 0.72 Scaled Score 12     Total Delay 11 z Score 0.91 Scaled Score 13     ILS Health and Safety Questionnaire         Total 35 Classification High       KRISHAN          Total 4 Interpretation Minimal       GDS          Total 10 Interpretation Mild/moderate

## 2020-05-14 ENCOUNTER — TELEPHONE (OUTPATIENT)
Dept: NEUROPSYCHOLOGY | Facility: CLINIC | Age: 75
End: 2020-05-14

## 2020-05-14 NOTE — TELEPHONE ENCOUNTER
The patient's wife emailed me. I saw there was a C2C for her in Epic so I replied. This is the email exchange:    Amos shared with me the report you sent him following his evaluation.  I'm glad to hear that you feel all things are normal, but it does still leave me wondering about the odd things he says and does (the empty blueberry container in the freezer notwithstanding).  I have a dear friend my age - in a memory unit for the past five years. She was found wandering on the streets of [place] searching for her car (later found in [place]), [place] [facility] [place] and diagnosed with early stage dementia - about three years in.  Experts tell you to dorado off dementia by doing brain games, keeping active, staying socially involved.  Atiya would be the last person you would expect to have dementia:  she was a  with amazing verbal and writing skills, interested in everything, an active swimmer, and an excellent friend to an amazing variety of people with whom she kept in regular touch...and we all missed it.  You can understand why I worry.    On another matter, I wonder if it is possible to get the results from the actual tests.  The two adult children who live with us were diagnosed last December with Level One Autism.  Not a surprise for Raman, whom I had suspected of autism from the time he was an infant; I was surprised for Nat, who was diagnosed with a significant learning disability when she was in second grade.  Unfortunately, [facility] [employer] in the 80's and 90's were not much interested in testing kids for anything:  to be fair, they had more than 50% of the entire State's special needs kids, and they weren't much interested in finding more.  Even with her LD Nat got services only through fifth grade, then was left to struggle on her own.  Raman was very grudgingly tested at the most basic level:  we were told he had a hard time following directions.  (I could  have told them that; the question was why???!)  Subsequently, when I went to work at [employer], I had him tested there.  The educational psychologist who tested him told me it was almost a crime that she could not certify him with a learning disability: on the majority of the categories he was right on the border; but he never crossed it and so, in spite of some obviously handicapping conditions, he was left to struggle on his own.    In fifty years of marriage, I have always thought that Amos almost certainly has a learning disability, but [place] no one would ever have even thought about such testing.  He is clearly much more intelligent than his academic grades would indicate, and I am more than a little curious to see how his results compare with the two kids.  Over the years, I have read and read and read - trying to understand, trying to help the kids and him.  He never seemed interested and just plodded along - taking enormous amounts of time to produce very little...and leaving me to  the pieces.  It has been frustrating.  At least if I can see where the trouble has been all along, somehow I think I would feel better for all the struggle I've gone through.    I get the HIPAA thing.  If it isn't possible, I understand.       Danielle Walker ([email or web address])  Make sure this is someone you trust. [email or web address] does not belong to your organization and is not in your contacts.     Dear Ms. Walker  Thank you for your email. I understand your concerns.  No medical/psychological procedure is able to answer all questions, but within the limits of the neuropsychological evaluation there was no evidence for cognitive concerns.  Overall your  did quite well on the cognitive tasks. There really was no evidence for a progressive dementia at this time. However, if further concerns arise in the future, we now have a baseline and would  be happy to see him back for a re-evaluation. At that point we could compare his performance at that time with his current performance.    As for a learning disorder, that is a specialist evaluation and since the referral question did not mention a learning disorder, that question was not addressed. In fact, the amount and types of tests required to answer that question are precluded in a telehealth evaluation. It's a fairly lengthy and complex question to address.  So I don't have any results related to a possible learning disorder. There are a number of policy reasons why we do not release neuropsychological data except to other licensed psychologists qualified to interpret the results, but also the results simply do not address learning disorders.    I hope this information is helpful.   Best regards,    Edmund Marmolejo, PhD, ABPP, LP  Board Certified Clinical Neuropsychologist  [facility] Department of Rehabilitation Medicine  [facility] [phone] [place] [address]

## 2020-05-15 ENCOUNTER — TELEPHONE (OUTPATIENT)
Dept: NEUROPSYCHOLOGY | Facility: CLINIC | Age: 75
End: 2020-05-15

## 2020-05-15 ENCOUNTER — TELEPHONE (OUTPATIENT)
Dept: PODIATRY | Facility: CLINIC | Age: 75
End: 2020-05-15

## 2020-05-15 NOTE — TELEPHONE ENCOUNTER
The patient's wife replied to my email (see note from yesterday):    Thanks so much for your prompt response.  I mentioned the test results only because I have them for my children and thought it would be interesting to compare.  Not the end of the world if I can't.    Edmund Marmolejo, PhD, [facility]

## 2020-05-15 NOTE — TELEPHONE ENCOUNTER
Do you have any of the following symptoms:  a)      Fever (or reported chills) No  b)      Shortness of Breath No  d)      Cough in the last 14 days No    If a patient reports yes to any of these symptoms, obtain direction from the provider and call the patient back to let them know if they can come in or not.  1.    Provider needs to determine if this patient should still be seen in clinic.  2.    If decision to not see in clinic, call patient back and refer them to COVID- 19 [email or web address] or schedule COVID-19 phone visit.  3.    Turn in-person visit into telephone visit (FOR RETURN PATIENTS ONLY)    Remind patients that visitors are not allowed on site. Only one legal guardian who screens negative to the above questions will be allowed to accompany patients. If a patient indicates that they will be bringing a legal guardian with them to the appointment please make sure to screen both the patient and the legal guardian for symptoms using the tool above.

## 2020-05-18 ENCOUNTER — OFFICE VISIT (OUTPATIENT)
Dept: PODIATRY | Facility: CLINIC | Age: 75
End: 2020-05-18
Payer: COMMERCIAL

## 2020-05-18 VITALS — DIASTOLIC BLOOD PRESSURE: 69 MMHG | HEART RATE: 93 BPM | SYSTOLIC BLOOD PRESSURE: 135 MMHG | OXYGEN SATURATION: 99 %

## 2020-05-18 DIAGNOSIS — L97.322 SKIN ULCER OF LEFT ANKLE WITH FAT LAYER EXPOSED (H): ICD-10-CM

## 2020-05-18 DIAGNOSIS — I87.8 VENOUS STASIS: ICD-10-CM

## 2020-05-18 DIAGNOSIS — E11.49 TYPE II OR UNSPECIFIED TYPE DIABETES MELLITUS WITH NEUROLOGICAL MANIFESTATIONS, NOT STATED AS UNCONTROLLED(250.60) (H): Primary | ICD-10-CM

## 2020-05-18 DIAGNOSIS — E11.610 CHARCOT FOOT DUE TO DIABETES MELLITUS (H): ICD-10-CM

## 2020-05-18 DIAGNOSIS — L97.511 SKIN ULCER OF RIGHT FOOT, LIMITED TO BREAKDOWN OF SKIN (H): ICD-10-CM

## 2020-05-18 DIAGNOSIS — E11.51 DIABETES MELLITUS WITH PERIPHERAL VASCULAR DISEASE (H): ICD-10-CM

## 2020-05-18 PROCEDURE — 99213 OFFICE O/P EST LOW 20 MIN: CPT | Performed by: PODIATRIST
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
## 2020-05-19 ENCOUNTER — HOSPITAL ENCOUNTER (OUTPATIENT)
Facility: CLINIC | Age: 75
Setting detail: SPECIMEN
Discharge: HOME | End: 2020-05-19
Admitting: INTERNAL MEDICINE
Payer: COMMERCIAL

## 2020-05-19 DIAGNOSIS — D47.2 MGUS (MONOCLONAL GAMMOPATHY OF UNKNOWN SIGNIFICANCE): ICD-10-CM

## 2020-05-19 DIAGNOSIS — D50.0 IRON DEFICIENCY ANEMIA DUE TO CHRONIC BLOOD LOSS: ICD-10-CM

## 2020-05-19 DIAGNOSIS — N18.30 CKD (CHRONIC KIDNEY DISEASE) STAGE 3, GFR 30-59 ML/MIN (H): ICD-10-CM

## 2020-05-19 LAB
ALBUMIN SERPL-MCNC: 3.5 G/DL (ref 3.4–5)
ALP SERPL-CCNC: 129 U/L (ref 40–150)
ALT SERPL W P-5'-P-CCNC: 15 U/L (ref 0–70)
ANION GAP SERPL CALCULATED.3IONS-SCNC: 3 MMOL/L (ref 3–14)
AST SERPL W P-5'-P-CCNC: 21 U/L (ref 0–45)
BASOPHILS # BLD AUTO: 0.1 10E9/L (ref 0–0.2)
BASOPHILS NFR BLD AUTO: 1.2 %
BILIRUB SERPL-MCNC: 0.4 MG/DL (ref 0.2–1.3)
BUN SERPL-MCNC: 35 MG/DL (ref 7–30)
CALCIUM SERPL-MCNC: 8.6 MG/DL (ref 8.5–10.1)
CHLORIDE SERPL-SCNC: 109 MMOL/L (ref 94–109)
CO2 SERPL-SCNC: 28 MMOL/L (ref 20–32)
CREAT SERPL-MCNC: 1.08 MG/DL (ref 0.66–1.25)
CRP SERPL-MCNC: <2.9 MG/L (ref 0–8)
DIFFERENTIAL METHOD BLD: ABNORMAL
EOSINOPHIL # BLD AUTO: 0.2 10E9/L (ref 0–0.7)
EOSINOPHIL NFR BLD AUTO: 5.1 %
ERYTHROCYTE [DISTWIDTH] IN BLOOD BY AUTOMATED COUNT: 13.2 % (ref 10–15)
ERYTHROCYTE [SEDIMENTATION RATE] IN BLOOD BY WESTERGREN METHOD: 19 MM/H (ref 0–20)
FERRITIN SERPL-MCNC: 146 NG/ML (ref 26–388)
GFR SERPL CREATININE-BSD FRML MDRD: 68 ML/MIN/{1.73_M2}
GLUCOSE SERPL-MCNC: 137 MG/DL (ref 70–99)
HCT VFR BLD AUTO: 36.7 % (ref 40–53)
HGB BLD-MCNC: 11.8 G/DL (ref 13.3–17.7)
IRON SATN MFR SERPL: 30 % (ref 15–46)
IRON SERPL-MCNC: 72 UG/DL (ref 35–180)
LYMPHOCYTES # BLD AUTO: 1.5 10E9/L (ref 0.8–5.3)
LYMPHOCYTES NFR BLD AUTO: 33.9 %
MCH RBC QN AUTO: 30.9 PG (ref 26.5–33)
MCHC RBC AUTO-ENTMCNC: 32.2 G/DL (ref 31.5–36.5)
MCV RBC AUTO: 96 FL (ref 78–100)
MONOCYTES # BLD AUTO: 0.4 10E9/L (ref 0–1.3)
MONOCYTES NFR BLD AUTO: 10.3 %
NEUTROPHILS # BLD AUTO: 2.1 10E9/L (ref 1.6–8.3)
NEUTROPHILS NFR BLD AUTO: 49.5 %
PLATELET # BLD AUTO: 177 10E9/L (ref 150–450)
POTASSIUM SERPL-SCNC: 4.1 MMOL/L (ref 3.4–5.3)
PROT SERPL-MCNC: 7.8 G/DL (ref 6.8–8.8)
RBC # BLD AUTO: 3.82 10E12/L (ref 4.4–5.9)
RETICS # AUTO: 42.5 10E9/L (ref 25–95)
RETICS/RBC NFR AUTO: 1.1 % (ref 0.5–2)
SODIUM SERPL-SCNC: 140 MMOL/L (ref 133–144)
TIBC SERPL-MCNC: 239 UG/DL (ref 240–430)
VIT B12 SERPL-MCNC: 296 PG/ML (ref 193–986)
WBC # BLD AUTO: 4.3 10E9/L (ref 4–11)

## 2020-05-19 PROCEDURE — 83883 ASSAY NEPHELOMETRY NOT SPEC: CPT | Performed by: INTERNAL MEDICINE

## 2020-05-19 PROCEDURE — 85045 AUTOMATED RETICULOCYTE COUNT: CPT | Performed by: INTERNAL MEDICINE

## 2020-05-19 PROCEDURE — 82668 ASSAY OF ERYTHROPOIETIN: CPT | Mod: 90 | Performed by: INTERNAL MEDICINE

## 2020-05-19 PROCEDURE — 83540 ASSAY OF IRON: CPT | Performed by: INTERNAL MEDICINE

## 2020-05-19 PROCEDURE — 85025 COMPLETE CBC W/AUTO DIFF WBC: CPT | Performed by: INTERNAL MEDICINE

## 2020-05-19 PROCEDURE — 85652 RBC SED RATE AUTOMATED: CPT | Performed by: INTERNAL MEDICINE

## 2020-05-19 PROCEDURE — 36415 COLL VENOUS BLD VENIPUNCTURE: CPT | Performed by: INTERNAL MEDICINE

## 2020-05-19 PROCEDURE — 99000 SPECIMEN HANDLING OFFICE-LAB: CPT | Performed by: INTERNAL MEDICINE

## 2020-05-19 PROCEDURE — 82728 ASSAY OF FERRITIN: CPT | Performed by: INTERNAL MEDICINE

## 2020-05-19 PROCEDURE — 00000402 ZZHCL STATISTIC TOTAL PROTEIN: Performed by: INTERNAL MEDICINE

## 2020-05-19 PROCEDURE — 84403 ASSAY OF TOTAL TESTOSTERONE: CPT | Performed by: INTERNAL MEDICINE

## 2020-05-19 PROCEDURE — 82607 VITAMIN B-12: CPT | Performed by: INTERNAL MEDICINE

## 2020-05-19 PROCEDURE — 82784 ASSAY IGA/IGD/IGG/IGM EACH: CPT | Performed by: INTERNAL MEDICINE

## 2020-05-19 PROCEDURE — 86140 C-REACTIVE PROTEIN: CPT | Performed by: INTERNAL MEDICINE

## 2020-05-19 PROCEDURE — 83550 IRON BINDING TEST: CPT | Performed by: INTERNAL MEDICINE

## 2020-05-19 PROCEDURE — 84165 PROTEIN E-PHORESIS SERUM: CPT | Performed by: INTERNAL MEDICINE

## 2020-05-19 PROCEDURE — 80053 COMPREHEN METABOLIC PANEL: CPT | Performed by: INTERNAL MEDICINE

## 2020-05-19 PROCEDURE — 85060 BLOOD SMEAR INTERPRETATION: CPT | Performed by: INTERNAL MEDICINE

## 2020-05-20 ENCOUNTER — MYC REFILL (OUTPATIENT)
Dept: INTERNAL MEDICINE | Facility: CLINIC | Age: 75
End: 2020-05-20

## 2020-05-20 DIAGNOSIS — E11.40 TYPE 2 DIABETES, CONTROLLED, WITH NEUROPATHY (H): ICD-10-CM

## 2020-05-20 LAB
COPATH REPORT: NORMAL
EPO SERPL-ACNC: 6 MU/ML (ref 4–27)
IGA SERPL-MCNC: 447 MG/DL (ref 84–499)
IGG SERPL-MCNC: 1675 MG/DL (ref 610–1616)
IGM SERPL-MCNC: 40 MG/DL (ref 35–242)
KAPPA LC UR-MCNC: 8.6 MG/DL (ref 0.33–1.94)
KAPPA LC/LAMBDA SER: 2.97 {RATIO} (ref 0.26–1.65)
LAMBDA LC SERPL-MCNC: 2.9 MG/DL (ref 0.57–2.63)
TESTOST SERPL-MCNC: 652 NG/DL (ref 240–950)

## 2020-05-21 LAB
ALBUMIN SERPL ELPH-MCNC: 3.7 G/DL (ref 3.7–5.1)
ALPHA1 GLOB SERPL ELPH-MCNC: 0.2 G/DL (ref 0.2–0.4)
ALPHA2 GLOB SERPL ELPH-MCNC: 0.8 G/DL (ref 0.5–0.9)
B-GLOBULIN SERPL ELPH-MCNC: 0.9 G/DL (ref 0.6–1)
GAMMA GLOB SERPL ELPH-MCNC: 1.6 G/DL (ref 0.7–1.6)
M PROTEIN SERPL ELPH-MCNC: 0.2 G/DL
PROT PATTERN SERPL ELPH-IMP: ABNORMAL

## 2020-05-22 RX ORDER — DULAGLUTIDE 1.5 MG/.5ML
1.5 INJECTION, SOLUTION SUBCUTANEOUS
Qty: 6 ML | Refills: 1 | Status: SHIPPED | OUTPATIENT
Start: 2020-05-22 | End: 2020-10-28

## 2020-05-28 ENCOUNTER — VIRTUAL VISIT (OUTPATIENT)
Dept: INTERNAL MEDICINE | Facility: CLINIC | Age: 75
End: 2020-05-28
Payer: COMMERCIAL

## 2020-05-28 DIAGNOSIS — I10 ESSENTIAL HYPERTENSION: Primary | ICD-10-CM

## 2020-05-28 DIAGNOSIS — D64.9 ANEMIA, UNSPECIFIED TYPE: ICD-10-CM

## 2020-05-28 DIAGNOSIS — I73.9 PVD (PERIPHERAL VASCULAR DISEASE) (H): ICD-10-CM

## 2020-05-28 DIAGNOSIS — E11.40 TYPE 2 DIABETES, CONTROLLED, WITH NEUROPATHY (H): ICD-10-CM
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
## 2020-05-28 NOTE — NURSING NOTE
Chief Complaint   Patient presents with     Hypertension     pt would like to discuss bp       Bee Valdez CMA, EMT at 8:20 AM on 5/28/2020.

## 2020-05-29 ENCOUNTER — TELEPHONE (OUTPATIENT)
Dept: INTERNAL MEDICINE | Facility: CLINIC | Age: 75
End: 2020-05-29

## 2020-06-03 ENCOUNTER — VIRTUAL VISIT (OUTPATIENT)
Dept: TRANSPLANT | Facility: CLINIC | Age: 75
End: 2020-06-03
Attending: INTERNAL MEDICINE
Payer: COMMERCIAL

## 2020-06-03 VITALS — BODY MASS INDEX: 21.18 KG/M2 | WEIGHT: 165 LBS

## 2020-06-03 DIAGNOSIS — D47.2 MGUS (MONOCLONAL GAMMOPATHY OF UNKNOWN SIGNIFICANCE): Primary | ICD-10-CM

## 2020-06-03 PROCEDURE — 40001009 ZZH VIDEO/TELEPHONE VISIT; NO CHARGE

## 2020-06-03 NOTE — PROGRESS NOTES
"Amos Walker is a 75 year old adult who is being evaluated via a billable video visit.      The patient has been notified of following:     \"This video visit will be conducted via a call between you and your physician/provider. We have found that certain health care needs can be provided without the need for an in-person physical exam.  This service lets us provide the care you need with a video conversation.  If a prescription is necessary we can send it directly to your pharmacy.  If lab work is needed we can place an order for that and you can then stop by our lab to have the test done at a later time.    Video visits are billed at different rates depending on your insurance coverage.  Please reach out to your insurance provider with any questions.    If during the course of the call the physician/provider feels a video visit is not appropriate, you will not be charged for this service.\"    Patient has given verbal consent for Video visit? Yes    How would you like to obtain your AVS? Gabriela    Patient would like the video invitation sent by: Send to e-mail at: [email or web address]    Will anyone else be joining your video visit? No       No current vitals.    Mabel Schroeder, [facility]    Amos Walker is a 74 year old adult who is being evaluated via a billable video visit.      The patient has been notified of following:     \"This video visit will be conducted via a call between you and your physician/provider. We have found that certain health care needs can be provided without the need for an in-person physical exam.  This service lets us provide the care you need with a video conversation.  If a prescription is necessary we can send it directly to your pharmacy.  If lab work is needed we can place an order for that and you can then stop by our lab to have the test done at a later time.    Video visits are billed at different rates depending on your insurance coverage.  Please reach out to your insurance provider " "with any questions.    If during the course of the call the physician/provider feels a video visit is not appropriate, you will not be charged for this service.\"    *Pt has no concerns today and no needs.  Kaleb Ramirez MD on 4/22/2020 at 3:05 PM  [phone]      Patient has given verbal consent for Video visit? Yes      How would you like to obtain your AVS? MyChart    Patient would like the video invitation sent by: Send to e-mail at: [email or web address]     Send video invite to [email or web address]= [email or web address]  # [phone]    Will anyone else be joining your video visit?         Video-Visit Details/TELEPHONE STARTED VIDEO LOST CONNECTION FINISHED ON TELEPHONE    Type of service:  Video Visit        Originating Location (pt. Location): [address] at [facility] [place]    Distant Location (provider location):  [facility] BLOOD AND MARROW TRANSPLANT     Mode of Communication:  Video Conference via [employer]      UNABLE TO CONNECT PT WAS NOT ABLE TO SEE SCREEN SO DID TELEPHONE    HISTORY OF PRESENT ILLNESS:  I started this as a video visit; however, there were technical issues with the video, the patient's computer did not seem to be responding and then I lost his image and I continued it as a telephone visit.  Mr. Walker is a 74-year-old gentleman referred for evaluation of anemia and monoclonal gammopathy.  He has a complicated medical history highlighted by type 2 diabetes, hypertension, coronary and peripheral artery disease, Charcot foot, chronic leg ulcers, chronic kidney disease and history of heavy tobacco use.  He has been noted to be anemic for quite a while.  He tells me that he was anemic several years ago when he was in [place].  He had some blood in the stool.  He did have colonoscopies done.  In reviewing his chart, he has had hemoglobins in the low 9/high 8 range on and off since 2017.  Occasionally his hemoglobin gets a little bit higher.  His most recent hemoglobin on 02/05 was 9.0.  He denies " any active bleeding now.  He does have foot ulcers.  He has had a lot of problems with infections.  He has had a history of MRSA involving his foot which he feels led ultimately to his Charcot foot and his diabetic ulcers.  He has not had a history of B12 deficiency.  He was noted in 2019 to have a monoclonal peak of 0.2.  There were no light chains in the urine.  His immunoglobulin levels were elevated in IgA and IgM.  His IgA was 604 and IgG was 1890.  There is an immunofixation of the monoclonal peak, which showed a free light chain and kappa chain type in his serum and an IgG kappa also in the serum.  He had a rather severe infection in 01/2020 with sepsis, acute metabolic encephalopathy, respiratory failure secondary to Legionella community-acquired pneumonia.  He was treated aggressively with antibiotics and ultimately was discharged.  He has had issues with type 2 diabetes, on Lantus and Humalog.  He also has had hypertension.  He sees Dr. Mcclain regularly for his right foot ulcer about every 2 weeks.  He has a Charcot foot.  He denies any fever or chills.  He denies any nausea, vomiting or diarrhea.     INTERVAL HISTORY  No covid sx no fever.Has daily cough he says due to smoking. Foot ulcer healing    PAST MEDICAL HISTORY:  Remarkable for coronary artery disease, chronic kidney disease, emphysema, heart disease, hyperlipidemia, peripheral artery disease, type 2 diabetes, venous ulcerations, chronic kidney disease, hypertension, atherosclerosis of native arteries of right leg, Charcot foot due to diabetes, angioplasty, a left total hip replacement, a femoral endarterectomy on the right, a cervical disk fusion, a right foot orthopedic surgery, and bilateral cataract surgery.      FAMILY HISTORY:  Remarkable for his father having colon cancer and kidney disease.  Mother had kidney and cardiac issues.  His son had a myocardial infarction at age 40.  His brother has macular degeneration.      SOCIAL HISTORY:   He is retired clergyman.  He is a smoker.  He smokes 2 packs per day for at least 25 years.  No alcohol use.  He is .  He has 4 children.      MEDICATIONS:  See EMR.      REVIEW OF SYSTEMS:   EYES:  Vision is much better since cataract surgery.   EARS:  Hearing is okay.   RESPIRATORY:  He has a chronic cough.  He feels that he has some postnasal drip.   CARDIAC:  No angina.   GASTROINTESTINAL:  No GERD.  No constipation, no melena, no hematochezia.   GENITOURINARY:  He has ED.  He does not have much in the way of nocturia.   NEUROLOGIC:  He has peripheral neuropathy.  No seizures or stroke.   MUSCULOSKELETAL:  He has the Charcot joint with Charcot foot.   PSYCHIATRIC:  His affect is flat.  He is scheduled to see a psychologist.      ALLERGIES:  Lisinopril, neomycin, methylchloroisothiazolinone, povidone-iodine.      PHYSICAL EXAMINATION:  telephone interview.            Results for RASHEED SORIANO (MRN [id number]) as of 6/3/2020 13:35   Ref. Range 5/19/2020 09:15   Sodium Latest Ref Range: 133 - 144 mmol/L 140   Potassium Latest Ref Range: 3.4 - 5.3 mmol/L 4.1   Chloride Latest Ref Range: 94 - 109 mmol/L 109   Carbon Dioxide Latest Ref Range: 20 - 32 mmol/L 28   Urea Nitrogen Latest Ref Range: 7 - 30 mg/dL 35 (H)   Creatinine Latest Ref Range: 0.66 - 1.25 mg/dL 1.08   GFR Estimate Latest Ref Range: >60 mL/min/1.73_m2 68   GFR Estimate If Black Latest Ref Range: >60 mL/min/1.73_m2 78   Calcium Latest Ref Range: 8.5 - 10.1 mg/dL 8.6   Anion Gap Latest Ref Range: 3 - 14 mmol/L 3   Albumin Latest Ref Range: 3.4 - 5.0 g/dL 3.5   Protein Total Latest Ref Range: 6.8 - 8.8 g/dL 7.8   Bilirubin Total Latest Ref Range: 0.2 - 1.3 mg/dL 0.4   Alkaline Phosphatase Latest Ref Range: 40 - 150 U/L 129   ALT Latest Ref Range: 0 - 70 U/L 15   AST Latest Ref Range: 0 - 45 U/L 21   CRP Inflammation Latest Ref Range: 0.0 - 8.0 mg/L <2.9   Erythropoietin Latest Ref Range: 4 - 27 mU/mL 6   Ferritin Latest Ref Range: 26 - 388 ng/mL  146   Iron Latest Ref Range: 35 - 180 ug/dL 72   Iron Binding Cap Latest Ref Range: 240 - 430 ug/dL 239 (L)   Iron Saturation Index Latest Ref Range: 15 - 46 % 30   Testosterone Total Latest Ref Range: 240 - 950 ng/dL 652   Vitamin B12 Latest Ref Range: 193 - 986 pg/mL 296   Glucose Latest Ref Range: 70 - 99 mg/dL 137 (H)   WBC Latest Ref Range: 4.0 - 11.0 10e9/L 4.3   Hemoglobin Latest Ref Range: 13.3 - 17.7 g/dL 11.8 (L)   Hematocrit Latest Ref Range: 40.0 - 53.0 % 36.7 (L)   Platelet Count Latest Ref Range: 150 - 450 10e9/L 177   RBC Count Latest Ref Range: 4.4 - 5.9 10e12/L 3.82 (L)   MCV Latest Ref Range: 78 - 100 fl 96   MCH Latest Ref Range: 26.5 - 33.0 pg 30.9   MCHC Latest Ref Range: 31.5 - 36.5 g/dL 32.2   RDW Latest Ref Range: 10.0 - 15.0 % 13.2   Diff Method Unknown Automated Method   % Neutrophils Latest Units: % 49.5   % Lymphocytes Latest Units: % 33.9   % Monocytes Latest Units: % 10.3   % Eosinophils Latest Units: % 5.1   % Basophils Latest Units: % 1.2   Absolute Neutrophil Latest Ref Range: 1.6 - 8.3 10e9/L 2.1   Absolute Lymphocytes Latest Ref Range: 0.8 - 5.3 10e9/L 1.5   Absolute Monocytes Latest Ref Range: 0.0 - 1.3 10e9/L 0.4   Absolute Eosinophils Latest Ref Range: 0.0 - 0.7 10e9/L 0.2   Absolute Basophils Latest Ref Range: 0.0 - 0.2 10e9/L 0.1   % Retic Latest Ref Range: 0.5 - 2.0 % 1.1   Absolute Retic Latest Ref Range: 25 - 95 10e9/L 42.5   Sed Rate Latest Ref Range: 0 - 20 mm/h 19   Albumin Fraction Latest Ref Range: 3.7 - 5.1 g/dL 3.7   Alpha 1 Fraction Latest Ref Range: 0.2 - 0.4 g/dL 0.2   Alpha 2 Fraction Latest Ref Range: 0.5 - 0.9 g/dL 0.8   Beta Fraction Latest Ref Range: 0.6 - 1.0 g/dL 0.9   ELP Interpretation: Unknown Small monoclonal ...   Gamma Fraction Latest Ref Range: 0.7 - 1.6 g/dL 1.6   IGA Latest Ref Range: 84 - 499 mg/dL 447   IGG Latest Ref Range: 610 - 1,616 mg/dL 1,675 (H)   IGM Latest Ref Range: 35 - 242 mg/dL 40   Kappa Free Lt Chain Latest Ref Range: 0.33 - 1.94  mg/dL 8.60 (H)   Kappa Lambda Ratio Latest Ref Range: 0.26 - 1.65  2.97 (H)   Lambda Free Lt Chain Latest Ref Range: 0.57 - 2.63 mg/dL 2.90 (H)   Monoclonal Peak Latest Ref Range: 0.0 g/dL 0.2 (H)   BLOOD MORPHOLOGY PATHOLOGIST REVIEW Unknown Rpt   Small monoclonal protein (0.2 g/dL) seen in the gamma fraction, not previously   characterized in our laboratory. Recommend serum and urine immunofixation for confirmation    and further characterization if not previously performed elsewhere. Pathologic   significance requires clinical correlation.  JOLANTA Benjamin M.D., Ph.D., Pathologist.   Peripheral Smear Morphology     FINAL DIAGNOSIS:   Peripheral blood demonstrating normochromic normocytic anemia with left   shift       ASSESSMENT:   1.  Chronic anemia, likely anemia of chronic inflammation.   2.  Monoclonal gammopathy of uncertain significance.   3.  Type 2 diabetes.   4.  Coronary artery disease.   5.  Peripheral artery disease.   6.  Charcot foot.  7.   Smoking    Reviewing the lab data Amos's M spike is stable at 0.2. His hemoglobin is increased to 11.8  I suggest he has anemia of chronic inflammation related to his spesis and Charcot foot. Epo and testosterone are ok. I explained the MGUS is common in individuals over 70. May be related to his chronic inflammation but no evidence for multiple myeloma , no CRAB criteria really met. Will repeat labs in 6 mo and see him then Discussed stopping smoking an and considering B vitamins and anti oxidants with hx of smoking and lowish B12.  Has no intention of quitting smoking.       I spent a total of 22 minutes on the phone and reviewing the charg with Amos EDI Bertosarah beth during today's office visit. Over 50% of this time was spent counseling the patient and coordinating the care regarding anemia and MGUS  Kaleb Ramirez MD           Video-Visit Details    Type of service:  Video Visit    Video Start Time: 1:32  Video End Time:   Originating Location (pt. Location):  [facility] Home, [place] in [facility] [place]    Distant Location (provider location):  [facility] BLOOD AND MARROW TRANSPLANT     Platform used for Video Visit: "[employer]

## 2020-06-03 NOTE — LETTER
"    6/3/2020         RE: Amos Walker  [address]  [place] [address]        Dear Colleague,    Thank you for referring your patient, Amos Walker, to the [facility]. Please see a copy of my visit note below.    Amos Walker is a 75 year old adult who is being evaluated via a billable video visit.      The patient has been notified of following:     \"This video visit will be conducted via a call between you and your physician/provider. We have found that certain health care needs can be provided without the need for an in-person physical exam.  This service lets us provide the care you need with a video conversation.  If a prescription is necessary we can send it directly to your pharmacy.  If lab work is needed we can place an order for that and you can then stop by our lab to have the test done at a later time.    Video visits are billed at different rates depending on your insurance coverage.  Please reach out to your insurance provider with any questions.    If during the course of the call the physician/provider feels a video visit is not appropriate, you will not be charged for this service.\"    Patient has given verbal consent for Video visit? Yes    How would you like to obtain your AVS? MyChart    Patient would like the video invitation sent by: Send to e-mail at: [email or web address]    Will anyone else be joining your video visit? No       No current vitals.    Mabel Schroeder, [facility]    Amos Walker is a 74 year old adult who is being evaluated via a billable video visit.      The patient has been notified of following:     \"This video visit will be conducted via a call between you and your physician/provider. We have found that certain health care needs can be provided without the need for an in-person physical exam.  This service lets us provide the care you need with a video conversation.  If a prescription is necessary we can send it directly to your pharmacy.  If " "lab work is needed we can place an order for that and you can then stop by our lab to have the test done at a later time.    Video visits are billed at different rates depending on your insurance coverage.  Please reach out to your insurance provider with any questions.    If during the course of the call the physician/provider feels a video visit is not appropriate, you will not be charged for this service.\"    *Pt has no concerns today and no needs.  Kaleb Ramirez MD on 4/22/2020 at 3:05 PM  [phone]      Patient has given verbal consent for Video visit? Yes      How would you like to obtain your AVS? MyChart    Patient would like the video invitation sent by: Send to e-mail at: [email or web address]     Send video invite to [email or web address]= [email or web address]  # [phone]    Will anyone else be joining your video visit?         Video-Visit Details/TELEPHONE STARTED VIDEO LOST CONNECTION FINISHED ON TELEPHONE    Type of service:  Video Visit        Originating Location (pt. Location): [address] at [facility] [place] [facility]    Distant Location (provider location):  [facility]     Mode of Communication:  Video Conference via [employer]      UNABLE TO CONNECT PT WAS NOT ABLE TO SEE SCREEN SO DID TELEPHONE    HISTORY OF PRESENT ILLNESS:  I started this as a video visit; however, there were technical issues with the video, the patient's computer did not seem to be responding and then I lost his image and I continued it as a telephone visit.  Mr. Wlaker is a 74-year-old gentleman referred for evaluation of anemia and monoclonal gammopathy.  He has a complicated medical history highlighted by type 2 diabetes, hypertension, coronary and peripheral artery disease, Charcot foot, chronic leg ulcers, chronic kidney disease and history of heavy tobacco use.  He has been noted to be anemic for quite a while.  He tells me that he was anemic several years ago when he was in [place].  He had some blood in the " stool.  He did have colonoscopies done.  In reviewing his chart, he has had hemoglobins in the low 9/high 8 range on and off since 2017.  Occasionally his hemoglobin gets a little bit higher.  His most recent hemoglobin on 02/05 was 9.0.  He denies any active bleeding now.  He does have foot ulcers.  He has had a lot of problems with infections.  He has had a history of MRSA involving his foot which he feels led ultimately to his Charcot foot and his diabetic ulcers.  He has not had a history of B12 deficiency.  He was noted in 2019 to have a monoclonal peak of 0.2.  There were no light chains in the urine.  His immunoglobulin levels were elevated in IgA and IgM.  His IgA was 604 and IgG was 1890.  There is an immunofixation of the monoclonal peak, which showed a free light chain and kappa chain type in his serum and an IgG kappa also in the serum.  He had a rather severe infection in 01/2020 with sepsis, acute metabolic encephalopathy, respiratory failure secondary to Legionella community-acquired pneumonia.  He was treated aggressively with antibiotics and ultimately was discharged.  He has had issues with type 2 diabetes, on Lantus and Humalog.  He also has had hypertension.  He sees Dr. Mcclain regularly for his right foot ulcer about every 2 weeks.  He has a Charcot foot.  He denies any fever or chills.  He denies any nausea, vomiting or diarrhea.     INTERVAL HISTORY  No covid sx no fever.Has daily cough he says due to smoking. Foot ulcer healing    PAST MEDICAL HISTORY:  Remarkable for coronary artery disease, chronic kidney disease, emphysema, heart disease, hyperlipidemia, peripheral artery disease, type 2 diabetes, venous ulcerations, chronic kidney disease, hypertension, atherosclerosis of native arteries of right leg, Charcot foot due to diabetes, angioplasty, a left total hip replacement, a femoral endarterectomy on the right, a cervical disk fusion, a right foot orthopedic surgery, and bilateral  cataract surgery.      FAMILY HISTORY:  Remarkable for his father having colon cancer and kidney disease.  Mother had kidney and cardiac issues.  His son had a myocardial infarction at age 40.  His brother has macular degeneration.      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  He smokes 2 packs per day for at least 25 years.  No alcohol use.  He is .  He has 4 children.      MEDICATIONS:  See EMR.      REVIEW OF SYSTEMS:   EYES:  Vision is much better since cataract surgery.   EARS:  Hearing is okay.   RESPIRATORY:  He has a chronic cough.  He feels that he has some postnasal drip.   CARDIAC:  No angina.   GASTROINTESTINAL:  No GERD.  No constipation, no melena, no hematochezia.   GENITOURINARY:  He has ED.  He does not have much in the way of nocturia.   NEUROLOGIC:  He has peripheral neuropathy.  No seizures or stroke.   MUSCULOSKELETAL:  He has the Charcot joint with Charcot foot.   PSYCHIATRIC:  His affect is flat.  He is scheduled to see a psychologist.      ALLERGIES:  Lisinopril, neomycin, methylchloroisothiazolinone, povidone-iodine.      PHYSICAL EXAMINATION:  telephone interview.            Results for BO RASHEED D (MRN [id number]) as of 6/3/2020 13:35   Ref. Range 5/19/2020 09:15   Sodium Latest Ref Range: 133 - 144 mmol/L 140   Potassium Latest Ref Range: 3.4 - 5.3 mmol/L 4.1   Chloride Latest Ref Range: 94 - 109 mmol/L 109   Carbon Dioxide Latest Ref Range: 20 - 32 mmol/L 28   Urea Nitrogen Latest Ref Range: 7 - 30 mg/dL 35 (H)   Creatinine Latest Ref Range: 0.66 - 1.25 mg/dL 1.08   GFR Estimate Latest Ref Range: >60 mL/min/1.73_m2 68   GFR Estimate If Black Latest Ref Range: >60 mL/min/1.73_m2 78   Calcium Latest Ref Range: 8.5 - 10.1 mg/dL 8.6   Anion Gap Latest Ref Range: 3 - 14 mmol/L 3   Albumin Latest Ref Range: 3.4 - 5.0 g/dL 3.5   Protein Total Latest Ref Range: 6.8 - 8.8 g/dL 7.8   Bilirubin Total Latest Ref Range: 0.2 - 1.3 mg/dL 0.4   Alkaline Phosphatase Latest  Ref Range: 40 - 150 U/L 129   ALT Latest Ref Range: 0 - 70 U/L 15   AST Latest Ref Range: 0 - 45 U/L 21   CRP Inflammation Latest Ref Range: 0.0 - 8.0 mg/L <2.9   Erythropoietin Latest Ref Range: 4 - 27 mU/mL 6   Ferritin Latest Ref Range: 26 - 388 ng/mL 146   Iron Latest Ref Range: 35 - 180 ug/dL 72   Iron Binding Cap Latest Ref Range: 240 - 430 ug/dL 239 (L)   Iron Saturation Index Latest Ref Range: 15 - 46 % 30   Testosterone Total Latest Ref Range: 240 - 950 ng/dL 652   Vitamin B12 Latest Ref Range: 193 - 986 pg/mL 296   Glucose Latest Ref Range: 70 - 99 mg/dL 137 (H)   WBC Latest Ref Range: 4.0 - 11.0 10e9/L 4.3   Hemoglobin Latest Ref Range: 13.3 - 17.7 g/dL 11.8 (L)   Hematocrit Latest Ref Range: 40.0 - 53.0 % 36.7 (L)   Platelet Count Latest Ref Range: 150 - 450 10e9/L 177   RBC Count Latest Ref Range: 4.4 - 5.9 10e12/L 3.82 (L)   MCV Latest Ref Range: 78 - 100 fl 96   MCH Latest Ref Range: 26.5 - 33.0 pg 30.9   MCHC Latest Ref Range: 31.5 - 36.5 g/dL 32.2   RDW Latest Ref Range: 10.0 - 15.0 % 13.2   Diff Method Unknown Automated Method   % Neutrophils Latest Units: % 49.5   % Lymphocytes Latest Units: % 33.9   % Monocytes Latest Units: % 10.3   % Eosinophils Latest Units: % 5.1   % Basophils Latest Units: % 1.2   Absolute Neutrophil Latest Ref Range: 1.6 - 8.3 10e9/L 2.1   Absolute Lymphocytes Latest Ref Range: 0.8 - 5.3 10e9/L 1.5   Absolute Monocytes Latest Ref Range: 0.0 - 1.3 10e9/L 0.4   Absolute Eosinophils Latest Ref Range: 0.0 - 0.7 10e9/L 0.2   Absolute Basophils Latest Ref Range: 0.0 - 0.2 10e9/L 0.1   % Retic Latest Ref Range: 0.5 - 2.0 % 1.1   Absolute Retic Latest Ref Range: 25 - 95 10e9/L 42.5   Sed Rate Latest Ref Range: 0 - 20 mm/h 19   Albumin Fraction Latest Ref Range: 3.7 - 5.1 g/dL 3.7   Alpha 1 Fraction Latest Ref Range: 0.2 - 0.4 g/dL 0.2   Alpha 2 Fraction Latest Ref Range: 0.5 - 0.9 g/dL 0.8   Beta Fraction Latest Ref Range: 0.6 - 1.0 g/dL 0.9   ELP Interpretation: Unknown Small  monoclonal ...   Gamma Fraction Latest Ref Range: 0.7 - 1.6 g/dL 1.6   IGA Latest Ref Range: 84 - 499 mg/dL 447   IGG Latest Ref Range: 610 - 1,616 mg/dL 1,675 (H)   IGM Latest Ref Range: 35 - 242 mg/dL 40   Kappa Free Lt Chain Latest Ref Range: 0.33 - 1.94 mg/dL 8.60 (H)   Kappa Lambda Ratio Latest Ref Range: 0.26 - 1.65  2.97 (H)   Lambda Free Lt Chain Latest Ref Range: 0.57 - 2.63 mg/dL 2.90 (H)   Monoclonal Peak Latest Ref Range: 0.0 g/dL 0.2 (H)   BLOOD MORPHOLOGY PATHOLOGIST REVIEW Unknown Rpt   Small monoclonal protein (0.2 g/dL) seen in the gamma fraction, not previously   characterized in our laboratory. Recommend serum and urine immunofixation for confirmation    and further characterization if not previously performed elsewhere. Pathologic   significance requires clinical correlation.  JOLANTA Benjamin M.D., Ph.D., Pathologist.   Peripheral Smear Morphology     FINAL DIAGNOSIS:   Peripheral blood demonstrating normochromic normocytic anemia with left   shift       ASSESSMENT:   1.  Chronic anemia, likely anemia of chronic inflammation.   2.  Monoclonal gammopathy of uncertain significance.   3.  Type 2 diabetes.   4.  Coronary artery disease.   5.  Peripheral artery disease.   6.  Charcot foot.  7.   Smoking    Reviewing the lab data Amos's M spike is stable at 0.2. His hemoglobin is increased to 11.8  I suggest he has anemia of chronic inflammation related to his spesis and Charcot foot. Epo and testosterone are ok. I explained the MGUS is common in individuals over 70. May be related to his chronic inflammation but no evidence for multiple myeloma , no CRAB criteria really met. Will repeat labs in 6 mo and see him then Discussed stopping smoking an and considering B vitamins and anti oxidants with hx of smoking and lowish B12.  Has no intention of quitting smoking.       I spent a total of 22 minutes on the phone and reviewing the charg with Amos Walker during today's office visit. Over 50% of this  time was spent counseling the patient and coordinating the care regarding anemia and MGUS  Kaleb Ramirez MD           Video-Visit Details    Type of service:  Video Visit    Video Start Time: 1:32  Video End Time:   Originating Location (pt. Location): [facility] Home, [place] in [facility] [place]    Distant Location (provider location):  [facility]LOOD AND MARROW TRANSPLANT     Platform used for Video Visit: WELL          Again, thank you for allowing me to participate in the care of your patient.        Sincerely,        Kaleb Ramirez MD

## 2020-06-12 ENCOUNTER — MYC REFILL (OUTPATIENT)
Dept: INTERNAL MEDICINE | Facility: CLINIC | Age: 75
End: 2020-06-12

## 2020-06-12 DIAGNOSIS — Z79.4 TYPE 2 DIABETES MELLITUS WITH DIABETIC PERIPHERAL ANGIOPATHY WITHOUT GANGRENE, WITH LONG-TERM CURRENT USE OF INSULIN (H): ICD-10-CM

## 2020-06-12 DIAGNOSIS — E11.51 TYPE 2 DIABETES MELLITUS WITH DIABETIC PERIPHERAL ANGIOPATHY WITHOUT GANGRENE, WITH LONG-TERM CURRENT USE OF INSULIN (H): ICD-10-CM

## 2020-06-15 ENCOUNTER — OFFICE VISIT (OUTPATIENT)
Dept: PODIATRY | Facility: CLINIC | Age: 75
End: 2020-06-15
Payer: COMMERCIAL

## 2020-06-15 ENCOUNTER — ANCILLARY PROCEDURE (OUTPATIENT)
Dept: GENERAL RADIOLOGY | Facility: CLINIC | Age: 75
End: 2020-06-15
Attending: PODIATRIST
Payer: COMMERCIAL

## 2020-06-15 DIAGNOSIS — E11.51 DIABETES MELLITUS WITH PERIPHERAL VASCULAR DISEASE (H): ICD-10-CM

## 2020-06-15 DIAGNOSIS — E11.49 TYPE II OR UNSPECIFIED TYPE DIABETES MELLITUS WITH NEUROLOGICAL MANIFESTATIONS, NOT STATED AS UNCONTROLLED(250.60) (H): ICD-10-CM

## 2020-06-15 DIAGNOSIS — I87.8 VENOUS STASIS: ICD-10-CM

## 2020-06-15 DIAGNOSIS — L97.511 SKIN ULCER OF RIGHT FOOT, LIMITED TO BREAKDOWN OF SKIN (H): ICD-10-CM

## 2020-06-15 DIAGNOSIS — L97.322 SKIN ULCER OF LEFT ANKLE WITH FAT LAYER EXPOSED (H): ICD-10-CM

## 2020-06-15 DIAGNOSIS — E11.610 CHARCOT FOOT DUE TO DIABETES MELLITUS (H): ICD-10-CM

## 2020-06-15 DIAGNOSIS — E11.49 TYPE II OR UNSPECIFIED TYPE DIABETES MELLITUS WITH NEUROLOGICAL MANIFESTATIONS, NOT STATED AS UNCONTROLLED(250.60) (H): Primary | ICD-10-CM

## 2020-06-15 PROCEDURE — 73630 X-RAY EXAM OF FOOT: CPT | Mod: RT | Performed by: RADIOLOGY

## 2020-06-15 PROCEDURE — 99213 OFFICE O/P EST LOW 20 MIN: CPT | Performed by: PODIATRIST

## 2020-06-15 PROCEDURE — 73610 X-RAY EXAM OF ANKLE: CPT | Mod: RT | Performed by: RADIOLOGY

## 2020-06-15 RX ORDER — FLASH GLUCOSE SENSOR
KIT MISCELLANEOUS
Qty: 4 EACH | Refills: 5 | Status: SHIPPED | OUTPATIENT
Start: 2020-06-15 | End: 2021-05-10
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
## 2020-06-17 ENCOUNTER — OFFICE VISIT (OUTPATIENT)
Dept: OPHTHALMOLOGY | Facility: CLINIC | Age: 75
End: 2020-06-17
Attending: OPHTHALMOLOGY
Payer: COMMERCIAL

## 2020-06-17 DIAGNOSIS — E11.3293 MILD NONPROLIFERATIVE DIABETIC RETINOPATHY OF BOTH EYES WITHOUT MACULAR EDEMA ASSOCIATED WITH TYPE 2 DIABETES MELLITUS (H): Primary | ICD-10-CM

## 2020-06-17 DIAGNOSIS — H35.363 DEGENERATIVE DRUSEN OF BOTH EYES: ICD-10-CM

## 2020-06-17 DIAGNOSIS — Z96.1 PSEUDOPHAKIA: ICD-10-CM

## 2020-06-17 DIAGNOSIS — H04.123 DRY EYES, BILATERAL: ICD-10-CM

## 2020-06-17 PROCEDURE — G0463 HOSPITAL OUTPT CLINIC VISIT: HCPCS | Mod: ZF

## 2020-06-17 ASSESSMENT — CONF VISUAL FIELD
OS_NORMAL: 1
OD_NORMAL: 1

## 2020-06-17 ASSESSMENT — VISUAL ACUITY
OS_SC+: -1
OS_PH_SC: 20/20
METHOD: SNELLEN - LINEAR
OS_SC: 20/40
OS_PH_SC+: -2
OD_SC: 20/20

## 2020-06-17 ASSESSMENT — EXTERNAL EXAM - RIGHT EYE: OD_EXAM: NORMAL

## 2020-06-17 ASSESSMENT — CUP TO DISC RATIO
OS_RATIO: 0.35
OD_RATIO: 0.35

## 2020-06-17 ASSESSMENT — TONOMETRY
OD_IOP_MMHG: 12
IOP_METHOD: TONOPEN
OS_IOP_MMHG: 13

## 2020-06-17 ASSESSMENT — EXTERNAL EXAM - LEFT EYE: OS_EXAM: NORMAL

## 2020-06-17 ASSESSMENT — SLIT LAMP EXAM - LIDS
COMMENTS: NORMAL
COMMENTS: NORMAL

## 2020-06-17 NOTE — PROGRESS NOTES
I have confirmed the patient's Past Medical History, Past Surgical History, Social History, Family History, Problem List, Medication List and Technician note.    CC: follow up diabetic retinopathy    HPI: more and more difficulty reading,  Patient wonders about Cataract extraction    Amos Walker is a 75 year old male with the following ophthalmologic problems:    Here for diabetic retinopathy check. Notes blurry vision in the left eye. Blood sugars have been fairly well controlled. Following up with endocrinologist next week.  History of DM2 x 25yrs, on insulin, most recent HbA1c: 5.8 (12/2019)      ASSESSMENT/PLAN  1. 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 Patient advised that failure to adequately control each may lead to vision loss. The patient expressed understanding.    2. Pseudophakia OU  - RD precautions    3. Drusen BE  - not AMD, mild      return to clinic: 9-12 months dilated fundus exam     Gabriel Carballo MD  Vitreoretinal surgery fellow  [facility]    Attestation:  I have seen and examined the patient with Dr. Carballo and agree with the findings in this note, as well as the interpretations of the diagnostic tests.      Jen Andersen MD PhD.  Professor & Chair

## 2020-06-17 NOTE — NURSING NOTE
Chief Complaints and History of Present Illnesses   Patient presents with     Diabetic Eye Exam     Chief Complaint(s) and History of Present Illness(es)     Diabetic Eye Exam     Associated symptoms: itching (occasionally noted - feels probably allergy).  Negative for flashes and floaters    Diabetes Type: Type 2    Duration: 1 year    Blood Sugars: is controlled    Pain scale: 0/10              Comments     Annual f/u for diabetic eye exam  Pt states he might have dry eye - pt occasionally uses AT's PRN  Did report noting floaters after the surgery but reports he doesn't notice these any longer    Fasting BS this AM was 101 (currently 189 in clinic)  Lab Results       Component                Value               Date                       A1C                      5.8                 12/20/2019                 A1C                      5.6                 10/04/2019                 A1C                      6.7                 03/27/2019                 A1C                      7.2                 11/16/2018                 A1C                      6.6                 06/21/2018    ROGER Doran 10:20 AM June 17, 2020

## 2020-06-30 DIAGNOSIS — J44.9 COPD (CHRONIC OBSTRUCTIVE PULMONARY DISEASE) (H): Primary | ICD-10-CM

## 2020-07-01 NOTE — TELEPHONE ENCOUNTER
RECORDS RECEIVED FROM: Yolanda   DATE RECEIVED: 7.6.2020   NOTES STATUS DETAILS   OFFICE NOTE from referring provider Internal 1.29.20 [facility]   OFFICE NOTE from other specialist N/A    DISCHARGE SUMMARY from hospital N/A    DISCHARGE REPORT from the ER Internal 1.29.20 [facility]   MEDICATION LIST Internal    IMAGING  (NEED IMAGES AND REPORTS)     CT SCAN Internal 1.29.20  5.11.18   CHEST XRAY (CXR) Internal 1.29.20  10.30.18   TESTS     PULMONARY FUNCTION TESTING (PFT) N/A

## 2020-07-06 ENCOUNTER — PRE VISIT (OUTPATIENT)
Dept: PULMONOLOGY | Facility: CLINIC | Age: 75
End: 2020-07-06

## 2020-07-06 ENCOUNTER — OFFICE VISIT (OUTPATIENT)
Dept: PULMONOLOGY | Facility: CLINIC | Age: 75
End: 2020-07-06
Payer: COMMERCIAL

## 2020-07-06 ENCOUNTER — ANCILLARY PROCEDURE (OUTPATIENT)
Dept: CT IMAGING | Facility: CLINIC | Age: 75
End: 2020-07-06
Payer: COMMERCIAL

## 2020-07-06 VITALS
DIASTOLIC BLOOD PRESSURE: 78 MMHG | SYSTOLIC BLOOD PRESSURE: 150 MMHG | OXYGEN SATURATION: 99 % | TEMPERATURE: 98.1 F | HEART RATE: 75 BPM | WEIGHT: 167.3 LBS | HEIGHT: 75 IN | BODY MASS INDEX: 20.8 KG/M2

## 2020-07-06 DIAGNOSIS — J44.9 COPD (CHRONIC OBSTRUCTIVE PULMONARY DISEASE) (H): ICD-10-CM

## 2020-07-06 DIAGNOSIS — Z87.01 HISTORY OF PNEUMONIA: ICD-10-CM

## 2020-07-06 DIAGNOSIS — Z87.891 PERSONAL HISTORY OF NICOTINE DEPENDENCE: ICD-10-CM

## 2020-07-06 DIAGNOSIS — J43.2 CENTRILOBULAR EMPHYSEMA (H): ICD-10-CM

## 2020-07-06 DIAGNOSIS — Z87.891 PERSONAL HISTORY OF NICOTINE DEPENDENCE: Primary | ICD-10-CM

## 2020-07-06 PROBLEM — J18.9 PNEUMONIA: Status: RESOLVED | Noted: 2020-01-29 | Resolved: 2020-07-06

## 2020-07-06 ASSESSMENT — MIFFLIN-ST. JEOR: SCORE: 1581.56

## 2020-07-06 NOTE — NURSING NOTE
Chief Complaint   Patient presents with     New Patient     chronic obstructive pulmonary disease     Vitals were taken and medications were reconciled.     SHANNEN Tyler
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
## 2020-07-08 DIAGNOSIS — Z87.891 PERSONAL HISTORY OF NICOTINE DEPENDENCE: Primary | ICD-10-CM

## 2020-07-08 LAB
DLCOUNC-%PRED-PRE: 94 %
DLCOUNC-PRE: 27.41 ML/MIN/MMHG
DLCOUNC-PRED: 29.09 ML/MIN/MMHG
ERV-%PRED-PRE: 21 %
ERV-PRE: 0.37 L
ERV-PRED: 1.72 L
EXPTIME-PRE: 8.06 SEC
FEF2575-%PRED-PRE: 90 %
FEF2575-PRE: 2.31 L/SEC
FEF2575-PRED: 2.57 L/SEC
FEFMAX-%PRED-PRE: 78 %
FEFMAX-PRE: 7.1 L/SEC
FEFMAX-PRED: 9.1 L/SEC
FEV1-%PRED-PRE: 98 %
FEV1-PRE: 3.51 L
FEV1FEV6-PRE: 71 %
FEV1FEV6-PRED: 77 %
FEV1FVC-PRE: 72 %
FEV1FVC-PRED: 75 %
FEV1SVC-PRE: 56 %
FEV1SVC-PRED: 65 %
FIFMAX-PRE: 3.42 L/SEC
FRCPLETH-%PRED-PRE: 156 %
FRCPLETH-PRE: 6.26 L
FRCPLETH-PRED: 3.99 L
FVC-%PRED-PRE: 101 %
FVC-PRE: 4.89 L
FVC-PRED: 4.81 L
IC-%PRED-PRE: 155 %
IC-PRE: 5.85 L
IC-PRED: 3.76 L
RVPLETH-%PRED-PRE: 206 %
RVPLETH-PRE: 5.88 L
RVPLETH-PRED: 2.85 L
TLCPLETH-%PRED-PRE: 150 %
TLCPLETH-PRE: 12.1 L
TLCPLETH-PRED: 8.04 L
VA-%PRED-PRE: 92 %
VA-PRE: 6.84 L
VC-%PRED-PRE: 113 %
VC-PRE: 6.22 L
VC-PRED: 5.48 L

## 2020-07-20 ENCOUNTER — OFFICE VISIT (OUTPATIENT)
Dept: PODIATRY | Facility: CLINIC | Age: 75
End: 2020-07-20
Payer: COMMERCIAL

## 2020-07-20 VITALS — HEART RATE: 89 BPM | DIASTOLIC BLOOD PRESSURE: 72 MMHG | OXYGEN SATURATION: 97 % | SYSTOLIC BLOOD PRESSURE: 158 MMHG

## 2020-07-20 DIAGNOSIS — E11.610 CHARCOT FOOT DUE TO DIABETES MELLITUS (H): ICD-10-CM

## 2020-07-20 DIAGNOSIS — E11.49 TYPE II OR UNSPECIFIED TYPE DIABETES MELLITUS WITH NEUROLOGICAL MANIFESTATIONS, NOT STATED AS UNCONTROLLED(250.60) (H): Primary | ICD-10-CM

## 2020-07-20 DIAGNOSIS — L97.322 SKIN ULCER OF LEFT ANKLE WITH FAT LAYER EXPOSED (H): ICD-10-CM

## 2020-07-20 DIAGNOSIS — E11.51 DIABETES MELLITUS WITH PERIPHERAL VASCULAR DISEASE (H): ICD-10-CM

## 2020-07-20 DIAGNOSIS — I87.8 VENOUS STASIS: ICD-10-CM

## 2020-07-20 PROCEDURE — 99213 OFFICE O/P EST LOW 20 MIN: CPT | Performed by: PODIATRIST
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
## 2020-07-22 ENCOUNTER — MYC REFILL (OUTPATIENT)
Dept: INTERNAL MEDICINE | Facility: CLINIC | Age: 75
End: 2020-07-22

## 2020-07-22 DIAGNOSIS — I25.83 CORONARY ARTERY DISEASE DUE TO LIPID RICH PLAQUE: ICD-10-CM

## 2020-07-22 DIAGNOSIS — I73.9 PERIPHERAL VASCULAR DISEASE, UNSPECIFIED (H): ICD-10-CM

## 2020-07-22 DIAGNOSIS — I73.9 PVD (PERIPHERAL VASCULAR DISEASE) (H): ICD-10-CM

## 2020-07-22 DIAGNOSIS — I25.10 CORONARY ARTERY DISEASE DUE TO LIPID RICH PLAQUE: ICD-10-CM

## 2020-07-22 DIAGNOSIS — E11.40 TYPE 2 DIABETES, CONTROLLED, WITH NEUROPATHY (H): ICD-10-CM

## 2020-07-23 RX ORDER — SIMVASTATIN 40 MG
40 TABLET ORAL AT BEDTIME
Qty: 90 TABLET | Refills: 0 | Status: SHIPPED | OUTPATIENT
Start: 2020-07-23 | End: 2020-10-22

## 2020-07-23 RX ORDER — CLOPIDOGREL BISULFATE 75 MG/1
75 TABLET ORAL EVERY EVENING
Qty: 90 TABLET | Refills: 3 | Status: CANCELLED | OUTPATIENT
Start: 2020-07-23

## 2020-07-23 NOTE — TELEPHONE ENCOUNTER
simvastatin (ZOCOR) 40 MG tablet     Last Written Prescription Date: 7/12/19  Last Fill Quantity: 90,   # refills: 3  Last Office Visit : 5/28/20  Future Office visit:  8/21/20    90 day SENT to pharmacy.    Warnings Override History for simvastatin (ZOCOR) 40 MG tablet [[id number]]     Overridden by Kvng Washington MD on Feb 6, 2020 10:49 AM    Drug-Drug    1. IMIDAZOLES / STATINS [Level: Major] [Reason: Tolerated medication/side effects in past]    Other Orders:  ketoconazole (NIZORAL) 2 % external shampoo           clopidogrel (PLAVIX) 75 MG tablet  Too soon.  Sent 10/4/19  #90 3 rfs.      Routing refill request to provider for review/approval because: LDL

## 2020-07-27 RX ORDER — SIMVASTATIN 40 MG
TABLET ORAL
Qty: 90 TABLET | Refills: 3 | OUTPATIENT
Start: 2020-07-27

## 2020-08-06 NOTE — MR AVS SNAPSHOT
After Visit Summary   10/23/2018    Amos Walker    MRN: [id number]           Patient Information     Date Of Birth          1945        Visit Information        Provider Department      10/23/2018 12:30 PM Brayan Mcclain DPM [facility]        Today's Diagnoses     Ulcer of right lower extremity with fat layer exposed (H)    -  1    Chronic venous hypertension with ulcer involving right side (H)        Type II or unspecified type diabetes mellitus with neurological manifestations, not stated as uncontrolled(250.60) (H)        Diabetes mellitus with peripheral vascular disease (H)           Follow-ups after your visit        Your next 10 appointments already scheduled     Nov 05, 2018  1:40 PM CST   (Arrive by 1:25 PM)   Return Visit with Racheal Swift MD   [facility] ([facility])    [address] [place] [address]  4th [facility] [id number] [phone]            Nov 06, 2018  3:00 PM CST   Return Visit with Brayan Mcclain DPM   [facility] ([facility])    [address] [place] [address] [id number] [phone]            Nov 08, 2018  1:00 PM CST   US CAROL DOPPLER NO EXERCISE 1-2 LVLS BILAT with UCUSV2   [facility] [place] ([facility])    [address] [place] [id number] [phone]           How do I prepare for my exam? (Food and drink instructions) No caffeine or tobacco for 1 hour prior to exam.  What should I wear: Wear comfortable clothes.  How long does the exam take: Most ultrasounds take 30 to 60 minutes.  What should I bring: Bring a list of your medicines, including vitamins, minerals and over-the-counter drugs. It is safest to leave personal items at home.  Do I need a :  No  is needed.  What do I need to tell my doctor: Tell your doctor about any allergies you may have.  What should I do after  the exam: No restrictions, You may resume normal activities.  What is this test: An ultrasound uses sound waves to make pictures of the body. Sound waves do not cause pain. The only discomfort may be the pressure of the wand against your skin or full bladder.  Who should I call with questions: If you have any questions, please call the Imaging Department where you will have your exam. Directions, parking instructions, and other information is available on our website, [email or web address].            Nov 08, 2018  2:00 PM CST   US LOWER EXTREMITY ARTERIAL DUPLEX RIGHT with UCUSV2   [facility] [place] ([facility])    [address]  1st [address]  [facility] [address] [phone]           How do I prepare for my exam? (Food and drink instructions) No Food and Drink Restrictions.  How do I prepare for my exam? (Other instructions) You do not need to do anything special to prepare for your exam.  What should I wear: Wear comfortable clothes.  How long does the exam take: Most ultrasounds take 30 to 60 minutes.  What should I bring: Bring a list of your medicines, including vitamins, minerals and over-the-counter drugs. It is safest to leave personal items at home.  Do I need a :  No  is needed.  What do I need to tell my doctor: Tell your doctor about any allergies you may have.  What should I do after the exam: No restrictions, You may resume normal activities.  What is this test: An ultrasound uses sound waves to make pictures of the body. Sound waves do not cause pain. The only discomfort may be the pressure of the wand against your skin or full bladder.  Who should I call with questions: If you have any questions, please call the Imaging Department where you will have your exam. Directions, parking instructions, and other information is available on our website, [email or web address].            Nov 08, 2018  3:40 PM CST   (Arrive by 3:25 PM)   Return Vascular  Visit with Augusto Maharaj MD   [facility] ([facility])    [address]  3rd [facility] [address]   [phone]            Nov 13, 2018  3:00 PM CST   Return Visit with Brayan Mcclain DPM   [facility] ([facility]    [address] [place] [address]   [phone]            Nov 20, 2018  2:30 PM CST   Return Visit with Brayan Mcclain DPM   [facility] ([facility])    [address] [place] [address] [phone]            Nov 27, 2018  2:30 PM CST   Return Visit with Brayan Mcclain DPM   [facility] ([facility])    [address] [place] [address] [phone]            Dec 04, 2018  3:00 PM CST   Return Visit with Brayan Mcclain DPM   [facility] ([facility])    [address] [place] [address] [phone]            Dec 11, 2018  2:30 PM CST   Return Visit with Brayan Mcclain DPM   [facility] ([facility])    [address] [place] [address] [phone]              Who to contact     If you have questions or need follow up information about today's clinic visit or your schedule please contact [facility] directly at [phone].  Normal or non-critical lab and imaging results will be communicated to you by MyChart, letter or phone within 4 business days after the clinic has received the results. If you do not hear from us within 7 days, please contact the clinic through MyChart or phone. If you have a critical or abnormal lab result, we will notify you by phone as soon as possible.  Submit refill requests through [employer] or call your pharmacy and they will forward the refill request to us. Please allow 3 business days for your refill to be completed.           Additional Information About Your Visit        [employer] Information     [employer] gives you secure access to your electronic health record. If you see a primary care provider, you can also send messages to your care team and make appointments. If you have questions, please call your primary care clinic.  If you do not have a primary care provider, please call [phone] and they will assist you.      [employer] is an electronic gateway that provides easy, online access to your medical records. With [employer], you can request a clinic appointment, read your test results, renew a prescription or communicate with your care team.     To access your existing account, please contact your [facility] or call [phone] for assistance.        Care EveryWhere ID     This is your Care EveryWhere ID. This could be used by other organizations to access your [facility] medical records  [id number]        Your Vitals Were     Pulse Temperature Pulse Oximetry             87 97.4  F (36.3  C) (Oral) 100%          Blood Pressure from Last 3 Encounters:   10/23/18 137/68   10/16/18 124/63   10/09/18 147/65    Weight from Last 3 Encounters:   09/18/18 75.8 kg (167 lb)   09/15/18 76.6 kg (168 lb 12.8 oz)   09/04/18 75.9 kg (167 lb 4.8 oz)              We Performed the Following     [id number] SKIN GRAFT T/A/L AREA/<100SCM /<1ST 25 SCM     TISSUE APLIGRAF SUPPLY, PER SQ CM          Today's Medication Changes          These changes are accurate as of 10/23/18 11:59 PM.  If you have any questions, ask your nurse or doctor.               These medicines have changed or have updated prescriptions.        Dose/Directions    * clopidogrel 75 MG tablet   Commonly known as:  PLAVIX   This may have changed:  when to take this   Used for:  Peripheral vascular disease, unspecified (H)        Dose:  75 mg   Take 1 tablet (75 mg) by mouth daily   Quantity:  30 tablet   Refills:  11       * clopidogrel 75 MG tablet   Commonly  known as:  PLAVIX   This may have changed:  Another medication with the same name was changed. Make sure you understand how and when to take each.   Used for:  Peripheral vascular disease (H)        Dose:  75 mg   Take 1 tablet (75 mg) by mouth daily   Quantity:  90 tablet   Refills:  0       gentamicin 0.1 % cream   Commonly known as:  GARAMYCIN   This may have changed:    - when to take this  - reasons to take this  - additional instructions   Used for:  Ulcer of right lower leg, with fat layer exposed (H), Chronic venous hypertension with ulcer involving right side (H), Type 2 diabetes, controlled, with neuropathy (H)        Apply topically daily To right leg ulcer.   Quantity:  30 g   Refills:  5       lisinopril 10 MG tablet   Commonly known as:  PRINIVIL/ZESTRIL   This may have changed:  when to take this   Used for:  Benign essential hypertension        Dose:  10 mg   Take 1 tablet (10 mg) by mouth daily   Quantity:  90 tablet   Refills:  3       * Notice:  This list has 2 medication(s) that are the same as other medications prescribed for you. Read the directions carefully, and ask your doctor or other care provider to review them with you.             Primary Care Provider Office Phone # Fax #    Racheal Swift [phone] [address] [address]        Equal Access to Services     SAMSON MELTON : Nataliia cortezo Soger, waaxda luqadaha, qaybta kaalmada adeegyada, yolanda lopez. So [facility] [phone].    ATENCIÓN: Si habla español, tiene a rodrigues disposición servicios gratuitos de asistencia lingüística. Llame al [phone].    We comply with applicable federal civil rights laws and [place] laws. We do not discriminate on the basis of race, color, national origin, age, disability, sex, sexual orientation, or gender identity.            Thank you!     Thank you for choosing [facility]  for your care. Our  goal is always to provide you with excellent care. Hearing back from our patients is one way we can continue to improve our services. Please take a few minutes to complete the written survey that you may receive in the mail after your visit with us. Thank you!             Your Updated Medication List - Protect others around you: Learn how to safely use, store and throw away your medicines at www.disposemymeds.org.          This list is accurate as of 10/23/18 11:59 PM.  Always use your most recent med list.                   Brand Name Dispense Instructions for use Diagnosis    ammonium lactate 12 % cream    LAC-HYDRIN    385 g    Apply topically 2 times daily as needed for dry skin    Venous stasis, Type 2 diabetes, controlled, with neuropathy (H)       ascorbic acid 500 MG Tabs     30 tablet    Take 1 tablet (500 mg) by mouth 2 times daily    Ulcer of right lower leg, with fat layer exposed (H)       ASPIRIN PO      Take 81 mg by mouth daily        blood glucose monitoring lancets     3 Box    Use to test blood sugars 2 as directed.    Type 2 diabetes, uncontrolled, with neuropathy (H)       Blood Pressure Kit     1 kit    1 Device daily    Benign essential hypertension       clindamycin 300 MG capsule    CLEOCIN    28 capsule    Take 1 capsule (300 mg) by mouth 2 times daily    Ulcer of right lower extremity with fat layer exposed (H), Venous stasis, Type II or unspecified type diabetes mellitus with neurological manifestations, not stated as uncontrolled(250.60) (H)       * clopidogrel 75 MG tablet    PLAVIX    30 tablet    Take 1 tablet (75 mg) by mouth daily    Peripheral vascular disease, unspecified (H)       * clopidogrel 75 MG tablet    PLAVIX    90 tablet    Take 1 tablet (75 mg) by mouth daily    Peripheral vascular disease (H)       ferrous sulfate 325 (65 Fe) MG tablet    IRON    60 tablet    Take 1 tablet (325 mg) by mouth 2 times daily    Peripheral vascular disease, unspecified (H)       gentamicin  "0.1 % cream    GARAMYCIN    30 g    Apply topically daily To right leg ulcer.    Ulcer of right lower leg, with fat layer exposed (H), Chronic venous hypertension with ulcer involving right side (H), Type 2 diabetes, controlled, with neuropathy (H)       insulin glargine 100 UNIT/ML injection    LANTUS SOLOSTAR    3 mL    Inject 16 Units Subcutaneous daily (with dinner) May take up to 22 units daily    Type 2 diabetes mellitus with diabetic peripheral angiopathy without gangrene, with long-term current use of insulin (H)       insulin pen needle 31G X 8 MM    B-D U/F    100 each    Use 1 daily as directed    Diabetes mellitus, type II (H)       lisinopril 10 MG tablet    PRINIVIL/ZESTRIL    90 tablet    Take 1 tablet (10 mg) by mouth daily    Benign essential hypertension       * nateglinide 120 MG tablet    STARLIX    90 tablet    TAKE 1 TABLET BY MOUTH THREE TIMES DAILY BEFORE MEALS    Type 2 diabetes, controlled, with neuropathy (H)       * nateglinide 120 MG tablet    STARLIX    96 tablet    TAKE 1 TABLET BY MOUTH THREE TIMES DAILY BEFORE MEALS    Diabetes mellitus (H)       ONETOUCH ULTRA test strip   Generic drug:  blood glucose monitoring     200 strip    USE TO TEST TWICE DAILY OR AS DIRECTED    Type 2 diabetes mellitus (H)       OPTIFOAM 6\"X6\" Pads     1 each    1 Box once a week    Ulcer of right leg, with fat layer exposed (H)       order for DME     2 each    Please measure and distribute 1 pair of 20mm Hg - 30mm Hg knee high ULCER CARE open or closed toe compression stockings.  Patient has a size 13 foot and please take this into consideration.  Jobst or equivalent    Varicose veins of lower extremities with other complications, Venous stasis ulcer of right lower extremity (H)       order for DME     3 each    Please measure and distribute 1 pair of 20mmHg - 30mmHg knee high open or closed toe compression stockings. Jobst ultrasheer or equivalent.    Varicose veins of both lower extremities with " Dizziness complications       order for DME     2 each    Please measure and distribute 1 pair of 30mmHg - 40mmHg knee high open toe ulcercare compression stockings. Jobst ultrasheer or equivalent.    Varicose veins of bilateral lower extremities with other complications       oxyCODONE IR 5 MG tablet    ROXICODONE    12 tablet    Take 0.5-1 tablets (2.5-5 mg) by mouth every 4 hours as needed for other (pain control or improvement in physical function. Hold dose for analgesic side effects.)    Acute post-operative pain       sildenafil 50 MG tablet    VIAGRA    10 tablet    Take 1 tablet (50 mg) by mouth daily as needed for erectile dysfunction    Vasculogenic erectile dysfunction, unspecified vasculogenic erectile dysfunction type       silver sulfADIAZINE 1 % cream    SILVADENE    85 g    Apply topically daily To affected areas on right foot and leg.    Ulcer of right lower leg, with fat layer exposed (H), Chronic venous hypertension with ulcer involving right side (H), Type 2 diabetes, controlled, with neuropathy (H)       simvastatin 10 MG tablet    ZOCOR    90 tablet    Take 1 tablet (10 mg) by mouth At Bedtime    Type 2 diabetes, controlled, with neuropathy (H)       triamcinolone 0.1 % cream    KENALOG    60 g    Apply sparingly to left heel daily.    Dermatitis of left foot       VITAMIN C PO      Take 1,000 mg by mouth        VITAMIN D (CHOLECALCIFEROL) PO      Take 1,000 Units by mouth 2 times daily        * Notice:  This list has 4 medication(s) that are the same as other medications prescribed for you. Read the directions carefully, and ask your doctor or other care provider to review them with you.       Acute on chronic systolic congestive heart failure

## 2020-08-12 DIAGNOSIS — I70.229 CRITICAL LOWER LIMB ISCHEMIA (H): ICD-10-CM

## 2020-08-12 DIAGNOSIS — E11.51 DIABETES MELLITUS WITH PERIPHERAL VASCULAR DISEASE (H): ICD-10-CM

## 2020-08-12 DIAGNOSIS — L97.911 ULCER OF RIGHT LOWER EXTREMITY, LIMITED TO BREAKDOWN OF SKIN (H): ICD-10-CM

## 2020-08-12 DIAGNOSIS — L97.912 ULCER OF RIGHT LOWER LEG, WITH FAT LAYER EXPOSED (H): Primary | ICD-10-CM

## 2020-08-17 ENCOUNTER — TELEPHONE (OUTPATIENT)
Dept: PODIATRY | Facility: CLINIC | Age: 75
End: 2020-08-17

## 2020-08-17 ENCOUNTER — OFFICE VISIT (OUTPATIENT)
Dept: PODIATRY | Facility: CLINIC | Age: 75
End: 2020-08-17
Payer: COMMERCIAL

## 2020-08-17 VITALS — OXYGEN SATURATION: 100 % | DIASTOLIC BLOOD PRESSURE: 74 MMHG | HEART RATE: 86 BPM | SYSTOLIC BLOOD PRESSURE: 157 MMHG

## 2020-08-17 DIAGNOSIS — E11.51 DIABETES MELLITUS WITH PERIPHERAL VASCULAR DISEASE (H): ICD-10-CM

## 2020-08-17 DIAGNOSIS — E11.49 TYPE II OR UNSPECIFIED TYPE DIABETES MELLITUS WITH NEUROLOGICAL MANIFESTATIONS, NOT STATED AS UNCONTROLLED(250.60) (H): Primary | ICD-10-CM

## 2020-08-17 DIAGNOSIS — L97.322 SKIN ULCER OF LEFT ANKLE WITH FAT LAYER EXPOSED (H): ICD-10-CM

## 2020-08-17 DIAGNOSIS — I87.8 VENOUS STASIS: ICD-10-CM

## 2020-08-17 PROCEDURE — 99213 OFFICE O/P EST LOW 20 MIN: CPT | Performed by: PODIATRIST
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
## 2020-08-17 NOTE — PATIENT INSTRUCTIONS
Thanks for coming today.  Ortho[facility] Medicine Clinic  [address] [place] [address]    To schedule future appointments in Ortho Clinic, you may call [phone].    To schedule ordered imaging by your provider:   Call [facility] [phone]    To schedule an injection ordered by your provider:  Call [facility] Injection scheduling line: [phone]  [facility] available online at:  [email or web address]    Please call if any further questions or concerns ([phone]).  Clinic hours 8 am to 5 pm.    Return to clinic (call) if symptoms worsen or fail to improve.

## 2020-08-17 NOTE — TELEPHONE ENCOUNTER
Financial Counselor Review for Apligraf, Dermagraft, Puraply AM, Affinity, NuShield:    Which product(s) to be checked: Apligraf, Dermagraft, Puraply AM, Affinity, NuShield    Has the patient tried any of these products before: YES    Was it for this wound: NO    Diagnosis code (include ICD-10 code): L97.322, E111.51    Coverage and patient financial responsibility information:YES    Does patient need to be contacted by Financial Counselor:YES    Note: Do not use abbreviations and route encounter to [facility] [place] [[id number]]

## 2020-08-18 ENCOUNTER — TELEPHONE (OUTPATIENT)
Dept: PODIATRY | Facility: CLINIC | Age: 75
End: 2020-08-18

## 2020-08-18 RX ORDER — SILVER SULFADIAZINE 10 MG/G
CREAM TOPICAL
Qty: 85 G | Refills: 11 | Status: SHIPPED | OUTPATIENT
Start: 2020-08-18

## 2020-08-18 NOTE — TELEPHONE ENCOUNTER
8/18 Provided phone number [phone] to schedule follow up with Dr. Mcclain. Provided phone number [phone] to schedule follow up with Tanisha Caal   Procedure    Ortho[facility]Sports Med/Pod[facility]  [facility] [place]   [phone]

## 2020-08-18 NOTE — TELEPHONE ENCOUNTER
SSD 1 % external cream       Last Written Prescription Date:  *historical     Last Office Visit : 5/28/2020  Future Office visit:  8/21/2020    Routing refill request to provider for review/approval because:  Medication is reported/historical

## 2020-08-18 NOTE — TELEPHONE ENCOUNTER
Benefit form and demographic facesheet faxed to [facility] for review. Pending.

## 2020-08-19 NOTE — TELEPHONE ENCOUNTER
This patient has 0 coverage for the apligraf products. He does not have coverage. Is there any other way Dr. Mcclain can treat him?    I have routed a message to the patient informing him of this.

## 2020-08-20 NOTE — TELEPHONE ENCOUNTER
I've informed the organo stephanie team to resubmit the benefit request. We will keep you posted.

## 2020-08-20 NOTE — TELEPHONE ENCOUNTER
He does have an E code but it was listed incorrectly on the original form. Code is E11.51. Resend to financial.   Ashley Ellison RN

## 2020-08-20 NOTE — TELEPHONE ENCOUNTER
Hi there.    Are there any E codes that would work for this paitent as a diagnosis code that we could try? Apparently that is needed in order for him to have coverage for the skin products.

## 2020-08-21 ENCOUNTER — VIRTUAL VISIT (OUTPATIENT)
Dept: INTERNAL MEDICINE | Facility: CLINIC | Age: 75
End: 2020-08-21
Payer: COMMERCIAL

## 2020-08-21 DIAGNOSIS — I25.10 CORONARY ARTERY DISEASE DUE TO LIPID RICH PLAQUE: ICD-10-CM

## 2020-08-21 DIAGNOSIS — I25.83 CORONARY ARTERY DISEASE DUE TO LIPID RICH PLAQUE: ICD-10-CM

## 2020-08-21 DIAGNOSIS — J30.2 SEASONAL ALLERGIC RHINITIS, UNSPECIFIED TRIGGER: ICD-10-CM

## 2020-08-21 DIAGNOSIS — I73.9 PVD (PERIPHERAL VASCULAR DISEASE) (H): ICD-10-CM

## 2020-08-21 DIAGNOSIS — E11.610 CHARCOT FOOT DUE TO DIABETES MELLITUS (H): ICD-10-CM

## 2020-08-21 DIAGNOSIS — E11.40 TYPE 2 DIABETES, CONTROLLED, WITH NEUROPATHY (H): Primary | ICD-10-CM

## 2020-08-21 RX ORDER — CETIRIZINE HYDROCHLORIDE 10 MG/1
10 TABLET ORAL DAILY
Qty: 90 TABLET | Refills: 1 | Status: SHIPPED | OUTPATIENT
Start: 2020-08-21 | End: 2023-08-17

## 2020-08-21 NOTE — TELEPHONE ENCOUNTER
[facility] Podiatry team.    Apparently the patient will have coverage is any of these codes apply to them:

## 2020-08-21 NOTE — NURSING NOTE
Chief Complaint   Patient presents with     Diabetes     pt would like to follow up on diabetes       Bee Valdez CMA, EMT at 10:09 AM on 8/21/2020.

## 2020-08-21 NOTE — TELEPHONE ENCOUNTER
We can use   e08.621, e11.621, i83.001-i83.029, i83.201-i83.229, i87.311-i87.319, or i87.331-i87.339 as he has all of these    Routing to financial   Ashley Ellison RN
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
## 2020-08-24 ENCOUNTER — TELEPHONE (OUTPATIENT)
Dept: INTERNAL MEDICINE | Facility: CLINIC | Age: 75
End: 2020-08-24

## 2020-08-24 NOTE — TELEPHONE ENCOUNTER
6 week follow up appointment made with Dr Swift.  Patient will do labs at [facility] sent via my chart  Annel Das CMA at 2:41 PM on 8/24/2020.

## 2020-08-25 NOTE — TELEPHONE ENCOUNTER
Benefits came back from [employer] [facility] Patient is approved for Apligraft covered up to 5 applications, Dermagraft covered up to 8 applications. Patient is not covered for PuraPly Am, PuraPly, PuraPly XT, [facility], [facility] has a deductible of $150, co-payment of $35 and a maximum out of pocket of $1000.00 so far patient has met $688.61

## 2020-09-02 ENCOUNTER — OFFICE VISIT (OUTPATIENT)
Dept: PODIATRY | Facility: CLINIC | Age: 75
End: 2020-09-02
Payer: COMMERCIAL

## 2020-09-02 VITALS — OXYGEN SATURATION: 100 % | SYSTOLIC BLOOD PRESSURE: 142 MMHG | HEART RATE: 97 BPM | DIASTOLIC BLOOD PRESSURE: 66 MMHG

## 2020-09-02 DIAGNOSIS — I87.8 VENOUS STASIS: ICD-10-CM

## 2020-09-02 DIAGNOSIS — E11.610 CHARCOT FOOT DUE TO DIABETES MELLITUS (H): ICD-10-CM

## 2020-09-02 DIAGNOSIS — S90.425A BLISTER OF TOE OF LEFT FOOT, INITIAL ENCOUNTER: ICD-10-CM

## 2020-09-02 DIAGNOSIS — E11.51 DIABETES MELLITUS WITH PERIPHERAL VASCULAR DISEASE (H): ICD-10-CM

## 2020-09-02 DIAGNOSIS — I25.83 CORONARY ARTERY DISEASE DUE TO LIPID RICH PLAQUE: ICD-10-CM

## 2020-09-02 DIAGNOSIS — L97.322 SKIN ULCER OF LEFT ANKLE WITH FAT LAYER EXPOSED (H): ICD-10-CM

## 2020-09-02 DIAGNOSIS — I25.10 CORONARY ARTERY DISEASE DUE TO LIPID RICH PLAQUE: ICD-10-CM

## 2020-09-02 DIAGNOSIS — E11.49 TYPE II OR UNSPECIFIED TYPE DIABETES MELLITUS WITH NEUROLOGICAL MANIFESTATIONS, NOT STATED AS UNCONTROLLED(250.60) (H): Primary | ICD-10-CM

## 2020-09-02 DIAGNOSIS — E11.40 TYPE 2 DIABETES, CONTROLLED, WITH NEUROPATHY (H): ICD-10-CM

## 2020-09-02 LAB
CHOLEST SERPL-MCNC: 107 MG/DL
CREAT UR-MCNC: 70 MG/DL
HBA1C MFR BLD: 5.8 % (ref 0–5.6)
HDLC SERPL-MCNC: 69 MG/DL
LDLC SERPL CALC-MCNC: 22 MG/DL
MICROALBUMIN UR-MCNC: 91 MG/L
MICROALBUMIN/CREAT UR: 130.32 MG/G CR (ref 0–17)
NONHDLC SERPL-MCNC: 38 MG/DL
TRIGL SERPL-MCNC: 80 MG/DL

## 2020-09-02 PROCEDURE — 82043 UR ALBUMIN QUANTITATIVE: CPT | Performed by: INTERNAL MEDICINE

## 2020-09-02 PROCEDURE — 80061 LIPID PANEL: CPT | Performed by: INTERNAL MEDICINE

## 2020-09-02 PROCEDURE — 36415 COLL VENOUS BLD VENIPUNCTURE: CPT | Performed by: INTERNAL MEDICINE

## 2020-09-02 PROCEDURE — 83036 HEMOGLOBIN GLYCOSYLATED A1C: CPT | Performed by: INTERNAL MEDICINE

## 2020-09-02 PROCEDURE — 99213 OFFICE O/P EST LOW 20 MIN: CPT | Performed by: PODIATRIST

## 2020-09-02 RX ORDER — CEFADROXIL 500 MG/1
500 CAPSULE ORAL 2 TIMES DAILY
Qty: 28 CAPSULE | Refills: 0 | Status: SHIPPED | OUTPATIENT
Start: 2020-09-02 | End: 2021-05-10
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
## 2020-09-02 NOTE — PATIENT INSTRUCTIONS
Thanks for coming today.  [facility]  [address] [place] [address]    To schedule future appointments in Ortho Clinic, you may call [phone].    To schedule ordered imaging by your provider:   Call Central Imaging [phone]    To schedule an injection ordered by your provider:  Call [facility] Injection scheduling line: [phone]  [employer] available online at:  [email or web address]    Please call if any further questions or concerns ([phone]).  Clinic hours 8 am to 5 pm.    Return to clinic (call) if symptoms worsen or fail to improve.
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
## 2020-09-09 ENCOUNTER — OFFICE VISIT (OUTPATIENT)
Dept: PODIATRY | Facility: CLINIC | Age: 75
End: 2020-09-09
Payer: COMMERCIAL

## 2020-09-09 VITALS — DIASTOLIC BLOOD PRESSURE: 79 MMHG | HEART RATE: 92 BPM | OXYGEN SATURATION: 100 % | SYSTOLIC BLOOD PRESSURE: 151 MMHG

## 2020-09-09 DIAGNOSIS — E11.51 DIABETES MELLITUS WITH PERIPHERAL VASCULAR DISEASE (H): ICD-10-CM

## 2020-09-09 DIAGNOSIS — Z87.2 HISTORY OF FOOT ULCER: ICD-10-CM

## 2020-09-09 DIAGNOSIS — E11.49 TYPE II OR UNSPECIFIED TYPE DIABETES MELLITUS WITH NEUROLOGICAL MANIFESTATIONS, NOT STATED AS UNCONTROLLED(250.60) (H): Primary | ICD-10-CM

## 2020-09-09 DIAGNOSIS — I87.8 VENOUS STASIS: ICD-10-CM

## 2020-09-09 DIAGNOSIS — S90.425D BLISTER OF TOE OF LEFT FOOT, SUBSEQUENT ENCOUNTER: ICD-10-CM

## 2020-09-09 DIAGNOSIS — L97.322 SKIN ULCER OF LEFT ANKLE WITH FAT LAYER EXPOSED (H): ICD-10-CM

## 2020-09-09 PROCEDURE — 99207 ZZC DROP WITH A PROCEDURE: CPT | Performed by: PODIATRIST

## 2020-09-09 PROCEDURE — 15271 SKIN SUB GRAFT TRNK/ARM/LEG: CPT | Performed by: PODIATRIST
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
## 2020-09-09 NOTE — NURSING NOTE
Amos Walker's chief complaint for this visit includes:  Chief Complaint   Patient presents with     RECHECK     Follow up-application of Apligraf     PCP: Racheal Swift    Referring Provider:  No referring provider defined for this encounter.    BP (!) 151/79 (BP Location: Right arm, Patient Position: Sitting, Cuff Size: Adult Regular)   Pulse 92   SpO2 100%   Data Unavailable     Do you need any medication refills at today's visit? No
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
## 2020-09-09 NOTE — PATIENT INSTRUCTIONS
Thanks for coming today.  [facility]  [address] [place] [address]    To schedule future appointments in Ortho Clinic, you may call [phone].    To schedule ordered imaging by your provider:   Call Central [facility] [phone]    To schedule an injection ordered by your provider:  Call [facility] Injection scheduling line: [phone]  [facility] available online at:  [email or web address]    Please call if any further questions or concerns ([phone]).  Clinic hours 8 am to 5 pm.    Return to clinic (call) if symptoms worsen or fail to improve.

## 2020-09-16 ENCOUNTER — OFFICE VISIT (OUTPATIENT)
Dept: PODIATRY | Facility: CLINIC | Age: 75
End: 2020-09-16
Payer: COMMERCIAL

## 2020-09-16 VITALS
OXYGEN SATURATION: 99 % | HEART RATE: 90 BPM | TEMPERATURE: 98.3 F | DIASTOLIC BLOOD PRESSURE: 72 MMHG | SYSTOLIC BLOOD PRESSURE: 147 MMHG

## 2020-09-16 DIAGNOSIS — L97.322 SKIN ULCER OF LEFT ANKLE WITH FAT LAYER EXPOSED (H): ICD-10-CM

## 2020-09-16 DIAGNOSIS — E11.51 DIABETES MELLITUS WITH PERIPHERAL VASCULAR DISEASE (H): ICD-10-CM

## 2020-09-16 DIAGNOSIS — E11.49 TYPE II OR UNSPECIFIED TYPE DIABETES MELLITUS WITH NEUROLOGICAL MANIFESTATIONS, NOT STATED AS UNCONTROLLED(250.60) (H): Primary | ICD-10-CM

## 2020-09-16 DIAGNOSIS — I87.8 VENOUS STASIS: ICD-10-CM

## 2020-09-16 PROCEDURE — 99212 OFFICE O/P EST SF 10 MIN: CPT | Performed by: PODIATRIST
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
## 2020-09-16 NOTE — PATIENT INSTRUCTIONS
Thanks for coming today.  [facility]  [address] [place] [address]    To schedule future appointments in Ortho Clinic, you may call [phone].    To schedule ordered imaging by your provider:   Call Central Imaging [phone]    To schedule an injection ordered by your provider:  Call Central [facility] Injection scheduling line: [phone]  [facility] available online at:  [email or web address]    Please call if any further questions or concerns ([phone]).  Clinic hours 8 am to 5 pm.    Return to clinic (call) if symptoms worsen or fail to improve.

## 2020-09-16 NOTE — NURSING NOTE
Amos Walker's chief complaint for this visit includes:  Chief Complaint   Patient presents with     RECHECK     follow up on left ankle ulcer     PCP: Racheal Swift    Referring Provider:  No referring provider defined for this encounter.    BP (!) 147/72 (BP Location: Right arm, Patient Position: Sitting, Cuff Size: Adult Regular)   Pulse 90   Temp 98.3  F (36.8  C)   SpO2 99%   Data Unavailable     Do you need any medication refills at today's visit? No
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
## 2020-09-17 ENCOUNTER — MYC REFILL (OUTPATIENT)
Dept: ORTHOPEDICS | Facility: CLINIC | Age: 75
End: 2020-09-17

## 2020-09-17 ENCOUNTER — MYC REFILL (OUTPATIENT)
Dept: INTERNAL MEDICINE | Facility: CLINIC | Age: 75
End: 2020-09-17

## 2020-09-17 DIAGNOSIS — D64.9 ANEMIA, UNSPECIFIED TYPE: ICD-10-CM

## 2020-09-17 DIAGNOSIS — E11.49 TYPE II OR UNSPECIFIED TYPE DIABETES MELLITUS WITH NEUROLOGICAL MANIFESTATIONS, NOT STATED AS UNCONTROLLED(250.60) (H): ICD-10-CM

## 2020-09-17 DIAGNOSIS — E11.40 TYPE 2 DIABETES, CONTROLLED, WITH NEUROPATHY (H): ICD-10-CM

## 2020-09-17 DIAGNOSIS — E11.40 TYPE 2 DIABETES, CONTROLLED, WITH NEUROPATHY (H): Primary | ICD-10-CM

## 2020-09-17 DIAGNOSIS — E11.610 CHARCOT FOOT DUE TO DIABETES MELLITUS (H): ICD-10-CM

## 2020-09-17 DIAGNOSIS — L97.511 SKIN ULCER OF RIGHT FOOT, LIMITED TO BREAKDOWN OF SKIN (H): ICD-10-CM

## 2020-09-17 DIAGNOSIS — E11.51 DIABETES MELLITUS WITH PERIPHERAL VASCULAR DISEASE (H): ICD-10-CM

## 2020-09-17 DIAGNOSIS — I87.8 VENOUS STASIS: ICD-10-CM

## 2020-09-17 RX ORDER — FERROUS SULFATE 325(65) MG
325 TABLET ORAL
Qty: 100 TABLET | Refills: 3 | Status: CANCELLED | OUTPATIENT
Start: 2020-09-17

## 2020-09-17 RX ORDER — PEN NEEDLE, DIABETIC 31 GX5/16"
NEEDLE, DISPOSABLE MISCELLANEOUS
Qty: 100 EACH | Refills: 11 | Status: CANCELLED | OUTPATIENT
Start: 2020-09-17

## 2020-09-17 RX ORDER — AMMONIUM LACTATE 12 G/100G
CREAM TOPICAL 2 TIMES DAILY PRN
Qty: 385 G | Refills: 3 | Status: SHIPPED | OUTPATIENT
Start: 2020-09-17 | End: 2023-02-16

## 2020-09-17 RX ORDER — GENTAMICIN SULFATE 1 MG/G
CREAM TOPICAL 3 TIMES DAILY
Qty: 60 G | Refills: 2 | Status: SHIPPED | OUTPATIENT
Start: 2020-09-17 | End: 2021-11-17

## 2020-09-17 NOTE — TELEPHONE ENCOUNTER
Gentamycin cream 0.1%  Last Written Prescription Date:  05/13/2020  Last Fill Quantity: 60 g,   # refills: 2    Lac-Hydrin 12% cream  Last Written Prescription Date:11/21/2018  Last Fill Quantity: 385 g, # of refills: 3    Last Office Visit: 9/16/2020  Future Office visit:    Next 5 appointments (look out 90 days)    Sep 23, 2020 11:30 AM CDT  Return Visit with Brayan Mcclain DPM  [facility]) [address] [place] [id number] [phone]   Sep 30, 2020 11:30 AM CDT  Return Visit with Brayan Mcclain DPM  [facility]) [address] [place] [id number] [phone]

## 2020-09-19 RX ORDER — PEN NEEDLE, DIABETIC 31 GX5/16"
NEEDLE, DISPOSABLE MISCELLANEOUS
Qty: 400 EACH | Refills: 3 | Status: SHIPPED | OUTPATIENT
Start: 2020-09-19 | End: 2021-09-29

## 2020-09-23 ENCOUNTER — OFFICE VISIT (OUTPATIENT)
Dept: PODIATRY | Facility: CLINIC | Age: 75
End: 2020-09-23
Payer: COMMERCIAL

## 2020-09-23 VITALS — HEART RATE: 90 BPM | OXYGEN SATURATION: 99 % | SYSTOLIC BLOOD PRESSURE: 149 MMHG | DIASTOLIC BLOOD PRESSURE: 69 MMHG

## 2020-09-23 DIAGNOSIS — I87.8 VENOUS STASIS: ICD-10-CM

## 2020-09-23 DIAGNOSIS — L97.322 SKIN ULCER OF LEFT ANKLE WITH FAT LAYER EXPOSED (H): ICD-10-CM

## 2020-09-23 DIAGNOSIS — E11.51 DIABETES MELLITUS WITH PERIPHERAL VASCULAR DISEASE (H): ICD-10-CM

## 2020-09-23 DIAGNOSIS — S90.425D BLISTER OF TOE OF LEFT FOOT, SUBSEQUENT ENCOUNTER: ICD-10-CM

## 2020-09-23 DIAGNOSIS — E11.49 TYPE II OR UNSPECIFIED TYPE DIABETES MELLITUS WITH NEUROLOGICAL MANIFESTATIONS, NOT STATED AS UNCONTROLLED(250.60) (H): Primary | ICD-10-CM

## 2020-09-23 PROCEDURE — 99213 OFFICE O/P EST LOW 20 MIN: CPT | Performed by: PODIATRIST
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
## 2020-09-23 NOTE — NURSING NOTE
Amos Walker's chief complaint for this visit includes:  Chief Complaint   Patient presents with     RECHECK     ulcer left medial ankle     PCP: Racheal Swift    Referring Provider:  No referring provider defined for this encounter.    BP (!) 149/69 (BP Location: Left arm, Patient Position: Sitting, Cuff Size: Adult Regular)   Pulse 90   SpO2 99%   Data Unavailable     Do you need any medication refills at today's visit? YE Vidal CMA
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
## 2020-09-30 ENCOUNTER — OFFICE VISIT (OUTPATIENT)
Dept: PODIATRY | Facility: CLINIC | Age: 75
End: 2020-09-30
Payer: COMMERCIAL

## 2020-09-30 VITALS — DIASTOLIC BLOOD PRESSURE: 70 MMHG | HEART RATE: 101 BPM | OXYGEN SATURATION: 99 % | SYSTOLIC BLOOD PRESSURE: 138 MMHG

## 2020-09-30 DIAGNOSIS — L97.322 SKIN ULCER OF LEFT ANKLE WITH FAT LAYER EXPOSED (H): ICD-10-CM

## 2020-09-30 DIAGNOSIS — E11.51 DIABETES MELLITUS WITH PERIPHERAL VASCULAR DISEASE (H): ICD-10-CM

## 2020-09-30 DIAGNOSIS — I87.8 VENOUS STASIS: ICD-10-CM

## 2020-09-30 DIAGNOSIS — E11.610 CHARCOT FOOT DUE TO DIABETES MELLITUS (H): ICD-10-CM

## 2020-09-30 DIAGNOSIS — E11.49 TYPE II OR UNSPECIFIED TYPE DIABETES MELLITUS WITH NEUROLOGICAL MANIFESTATIONS, NOT STATED AS UNCONTROLLED(250.60) (H): Primary | ICD-10-CM

## 2020-09-30 DIAGNOSIS — S90.425D BLISTER OF TOE OF LEFT FOOT, SUBSEQUENT ENCOUNTER: ICD-10-CM

## 2020-09-30 PROCEDURE — 15271 SKIN SUB GRAFT TRNK/ARM/LEG: CPT | Performed by: PODIATRIST

## 2020-09-30 PROCEDURE — 99212 OFFICE O/P EST SF 10 MIN: CPT | Mod: 25 | Performed by: PODIATRIST
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
## 2020-09-30 NOTE — PATIENT INSTRUCTIONS
Thanks for coming today.  [facility]  [address] [place] [address]    To schedule future appointments in Ortho Clinic, you may call [phone].    To schedule ordered imaging by your provider:   Call [facility] [phone]    To schedule an injection ordered by your provider:  Call [facility] Injection scheduling line: [phone]  [facility] available online at:  [email or web address]    Please call if any further questions or concerns ([phone]).  Clinic hours 8 am to 5 pm.    Return to clinic (call) if symptoms worsen or fail to improve.

## 2020-10-05 ENCOUNTER — OFFICE VISIT (OUTPATIENT)
Dept: INTERNAL MEDICINE | Facility: CLINIC | Age: 75
End: 2020-10-05
Payer: COMMERCIAL

## 2020-10-05 VITALS
HEART RATE: 87 BPM | OXYGEN SATURATION: 99 % | SYSTOLIC BLOOD PRESSURE: 136 MMHG | WEIGHT: 165 LBS | BODY MASS INDEX: 20.9 KG/M2 | DIASTOLIC BLOOD PRESSURE: 67 MMHG

## 2020-10-05 DIAGNOSIS — E11.610 CHARCOT FOOT DUE TO DIABETES MELLITUS (H): ICD-10-CM

## 2020-10-05 DIAGNOSIS — I10 ESSENTIAL HYPERTENSION: ICD-10-CM

## 2020-10-05 DIAGNOSIS — N18.30 STAGE 3 CHRONIC KIDNEY DISEASE, UNSPECIFIED WHETHER STAGE 3A OR 3B CKD (H): ICD-10-CM

## 2020-10-05 DIAGNOSIS — Z23 NEED FOR INFLUENZA VACCINATION: Primary | ICD-10-CM

## 2020-10-05 DIAGNOSIS — L30.9 ECZEMA, UNSPECIFIED TYPE: ICD-10-CM

## 2020-10-05 DIAGNOSIS — E11.40 TYPE 2 DIABETES, CONTROLLED, WITH NEUROPATHY (H): ICD-10-CM

## 2020-10-05 DIAGNOSIS — I73.9 PVD (PERIPHERAL VASCULAR DISEASE) (H): ICD-10-CM

## 2020-10-05 PROCEDURE — 99214 OFFICE O/P EST MOD 30 MIN: CPT | Mod: 25 | Performed by: INTERNAL MEDICINE

## 2020-10-05 PROCEDURE — G0008 ADMIN INFLUENZA VIRUS VAC: HCPCS | Performed by: INTERNAL MEDICINE

## 2020-10-05 PROCEDURE — 90662 IIV NO PRSV INCREASED AG IM: CPT | Performed by: INTERNAL MEDICINE

## 2020-10-05 RX ORDER — TRIAMCINOLONE ACETONIDE 0.25 MG/G
OINTMENT TOPICAL
Qty: 15 G | Refills: 1 | Status: SHIPPED | OUTPATIENT
Start: 2020-10-05 | End: 2023-04-19

## 2020-10-05 RX ORDER — CIPROFLOXACIN AND DEXAMETHASONE 3; 1 MG/ML; MG/ML
4 SUSPENSION/ DROPS AURICULAR (OTIC) 2 TIMES DAILY
Qty: 7.5 ML | Refills: 0 | Status: SHIPPED | OUTPATIENT
Start: 2020-10-05 | End: 2020-10-10

## 2020-10-05 RX ORDER — TRIAMCINOLONE ACETONIDE 1 MG/G
CREAM TOPICAL
Qty: 80 G | Refills: 1 | Status: ON HOLD | OUTPATIENT
Start: 2020-10-05 | End: 2023-07-03

## 2020-10-05 ASSESSMENT — PAIN SCALES - GENERAL: PAINLEVEL: NO PAIN (0)

## 2020-10-05 NOTE — PROGRESS NOTES
Mr. Walker is a 75 year old adult here for follow up.    History of Present Illness:  Amos has complex PMH involving tobacco use, DM, CAD, PAD, Right Charcot foot, diabetic neuropathy, emphysema, tobacco use, anemia of chronic disease, MGUS.   He has been bearing weight on right foot with brace.  Overall, no major changes in health status.  Today he reports persistent nasal drainage, ear wax.  He also has a rash on extensor surface forearms and around eyes/mouth.  He has something similar in past and saw Derm.  Didn't start zyrtec.  No temps, no sore throat, no sneezing, no taste/smell changes.  He did try some psoriasis medication which helped his arms.    Routine Health Maintenance  Immunizations:           Most Recent Immunizations   Administered Date(s) Administered     Influenza (High Dose) 3 valent vaccine 10/04/2019     Influenza (IIV3) PF 11/01/2013     Pneumo Conj 13-V (2010&after) 11/03/2014     Pneumococcal 23 valent 12/21/2015     TDAP Vaccine (Boostrix) 03/21/2016         Lipids:               Recent Labs   Lab Test 03/27/19  0934 11/16/18  0915   11/18/14  0853   CHOL 95 100   < > 110   HDL 38* 48   < > 45   LDL 49 42   < > 45   TRIG 40 50   < > 100   CHOLHDLRATIO  --   --   --  2.4    < > = values in this interval not displayed.      PSA (50-75 yrs): No results found for: PSA     AAA Screening (65-75 yrs): CT 12/17, negative  Lung Ca Screening (>30 py 55-79 or >20 py 50-79 + RF): 7/20  Colonoscopy (50-75 yrs): 4/18, recommend repeat when off plavix, pending for June and he will hold plavix x 5 days; 6/19 Impression:          - The examined portion of the ileum was normal.                        - One 5 mm polyp in the cecum, removed with a cold snare                        and removed using injection-lift and a cold snare.                        Resected and retrieved.                        - One 6 mm polyp in the transverse colon, removed with a                        hot snare and removed  using injection-lift and a hot                        snare. Resected and retrieved. Clip was placed.                        - One 5 mm polyp in the sigmoid colon, removed with a                        hot snare. Resected and retrieved.                        - The examination was otherwise normal on direct and                        retroflexion views.                        NOTE: the polyp reported as being in the descending                        colon in the prior colonoscopy report appears on images                        in the transverse colon; that is where we removed a                        likely SSA. The descending colon was inspected on                        multiple occasions and no polyps were identified.   Recommendation:      - Return to referring physician.                        - Repeat colonoscopy in 5 years for surveillance.                                                                           HIV/HCV if risk factors: nonreactive HepC 6/16  Safety/Lifestyle: reviewed  Tob/EtOH: declines quitting  Depression:   PHQ-2 Score:      PHQ-2 ( 1999 [employer]) 10/15/2019 7/31/2019   Q1: Little interest or pleasure in doing things 0 0   Q2: Feeling down, depressed or hopeless 1 0   PHQ-2 Score 1 0      Advanced Directive: deferred         A full 10-pt Review of Systems was performed, verified and is negative except as documented in the HPI.  All health questionnaires were reviewed, verified and relevant information documented above.      Past Medical History:  Past Medical History:   Diagnosis Date     Anemia      CAD (coronary artery disease)     2V CAD involving LAD and RCA, s/p DESx4 in 3/18     CKD (chronic kidney disease) stage 3, GFR 30-59 ml/min      Colon polyp      Emphysema of lung (H)     noted on CT     Heart disease      HTN (hypertension)      Hyperlipidemia      MRSA cellulitis of right foot     in past.      PAD (peripheral artery disease) (H) 09/2018    s/p R femoral enarterectomy and  stenting      Tobacco use     50+ pack     Type 2 diabetes mellitus (H)     for 25 yrs.  on insulin and starlix     Venous ulcer (H)        Active Meds:  Current Outpatient Medications   Medication     ammonium lactate (LAC-HYDRIN) 12 % external cream     ascorbic acid 500 MG TABS     aspirin 81 MG EC tablet     blood glucose monitoring (FREESTYLE) lancets     cefadroxil (DURICEF) 500 MG capsule     cetirizine (ZYRTEC) 10 MG tablet     ciprofloxacin-dexamethasone (CIPRODEX) 0.3-0.1 % otic suspension     clopidogrel (PLAVIX) 75 MG tablet     Continuous Blood Gluc  (FREESTYLE LATOYA 14 DAY READER) TANIA     continuous blood glucose monitoring (FREESTYLE LATOYA) sensor     dulaglutide (TRULICITY) 1.5 MG/0.5ML pen     ferrous sulfate (FEROSUL) 325 (65 Fe) MG tablet     gentamicin (GARAMYCIN) 0.1 % external cream     insulin lispro (HUMALOG KWIKPEN) 100 UNIT/ML (1 unit dial) KWIKPEN     insulin pen needle (B-D U/F) 31G X 8 MM miscellaneous     ketoconazole (NIZORAL) 2 % external shampoo     lisinopril (PRINIVIL/ZESTRIL) 2.5 MG tablet     ONETOUCH ULTRA test strip     silver sulfADIAZINE (SSD) 1 % external cream     simvastatin (ZOCOR) 40 MG tablet     triamcinolone (KENALOG) 0.025 % external ointment     triamcinolone (KENALOG) 0.1 % external cream     triamcinolone (KENALOG) 0.1 % external cream     VITAMIN D, CHOLECALCIFEROL, PO     No current facility-administered medications for this visit.         Allergies:  Reviewed, refer to EMR    Relevant Social History:  Social History     Tobacco Use     Smoking status: Current Every Day Smoker     Packs/day: 0.50     Years: 50.00     Pack years: 25.00     Types: Cigarettes     Smokeless tobacco: Never Used     Tobacco comment: heavier smoker in the past   Substance Use Topics     Alcohol use: No     Drug use: No         Physical Exam:  Vitals: /67 (BP Location: Right arm, Patient Position: Sitting, Cuff Size: Adult Regular)   Pulse 87   Wt 74.8 kg (165 lb)    SpO2 99%   BMI 20.90 kg/m    Constitutional: Alert, oriented, pleasant, no acute distress  Head: Normocephalic, atraumatic  Eyes: Extra-ocular movements intact, pupils equally round and reactive bilaterally, no scleral icterus  ENT: Oropharynx clear, moist mucus membranes  Neck: Supple, no lymphadenopathy  Cardiovascular: Regular rate and rhythm, no murmurs, rubs or gallops, peripheral pulses full/symmetric  Respiratory: Good air movement bilaterally, lungs clear, no wheezes/rales/rhonchi  Musculoskeletal: No edema, normal muscle tone, wearing R ankle boot  Neurologic: Alert and oriented, cranial nerves 2-12 intact  Skin: Diffusely erythematous flaking skin around eyes bilat and nasolabial folds  Psychiatric: normal mentation, affect and mood        Assessment and Plan:    Amos was seen today for recheck medication and eye problem.    Diagnoses and all orders for this visit:    Need for influenza vaccination  -     FLU VACCINE HIGH DOSE PRESERVATIVE FREE, AGE =>65 YR    Eczema, unspecified type  If dermatitis on face doesn't improve, would consider ketoconazole for treatment seb dermatitis as well.  -     triamcinolone (KENALOG) 0.1 % external cream; Apply to arms twice daily for 14 days, then use twice daily 2 times per week only  -     triamcinolone (KENALOG) 0.025 % external ointment; Apply twice daily to skin around eyes and mouth for 2 weeks, then use twice daily for 2 days per week only.  -     ciprofloxacin-dexamethasone (CIPRODEX) 0.3-0.1 % otic suspension; Place 4 drops into both ears 2 times daily for 5 days    Type 2 diabetes, controlled, with neuropathy (H)  PVD (peripheral vascular disease) (H)  Stage 3 chronic kidney disease, unspecified whether stage 3a or 3b CKD  Charcot foot due to diabetes mellitus (H)  C/b micro and macrovascular complications, CKD, neuropathy, Charcot foot, CAD, PAD, ulcers.  His A1c remains tightly controled. I have advised him that BG below 70 are not safe and we should  either modify diet or loose insulin requirements. He is reluctant to make changes.    Essential hypertension  Stable, continue low dose ACE.              Return to clinic: 2-3 months    Racheal Swift MD  Internal Medicine      25 min spent face to face, of which >50% time spent on counseling/coordinating care exclusive of any procedure time

## 2020-10-05 NOTE — NURSING NOTE
Chief Complaint   Patient presents with     Recheck Medication     pt here to follow up from a few weeks ago     Eye Problem     pt would like to discuss redness around eyes       Bee Valdez CMA, EMT at 2:54 PM on 10/5/2020.

## 2020-10-05 NOTE — NURSING NOTE
Chief Complaint   Patient presents with     Recheck Medication     pt here to follow up from a few weeks ago       Bee Valdez CMA, EMT at 2:53 PM on 10/5/2020.

## 2020-10-05 NOTE — PATIENT INSTRUCTIONS
[facility] Medication Refill Request Information:  * Please contact your pharmacy regarding ANY request for medication refills.  ** [facility] Prescription Fax = [phone]  * Please allow 3 business days for routine medication refills.  * Please allow 5 business days for controlled substance medication refills.     [facility] Test Result notification information:  *You will be notified with in 7-10 days of your appointment day regarding the results of your test.  If you are on MyChart you will be notified as soon as the provider has reviewed the results and signed off on them.    [facility]: [phone]

## 2020-10-07 ENCOUNTER — OFFICE VISIT (OUTPATIENT)
Dept: PODIATRY | Facility: CLINIC | Age: 75
End: 2020-10-07
Payer: COMMERCIAL

## 2020-10-07 VITALS — HEART RATE: 83 BPM | SYSTOLIC BLOOD PRESSURE: 147 MMHG | DIASTOLIC BLOOD PRESSURE: 72 MMHG | OXYGEN SATURATION: 99 %

## 2020-10-07 DIAGNOSIS — E11.51 DIABETES MELLITUS WITH PERIPHERAL VASCULAR DISEASE (H): ICD-10-CM

## 2020-10-07 DIAGNOSIS — I87.8 VENOUS STASIS: ICD-10-CM

## 2020-10-07 DIAGNOSIS — L97.322 SKIN ULCER OF LEFT ANKLE WITH FAT LAYER EXPOSED (H): ICD-10-CM

## 2020-10-07 DIAGNOSIS — E11.49 TYPE II OR UNSPECIFIED TYPE DIABETES MELLITUS WITH NEUROLOGICAL MANIFESTATIONS, NOT STATED AS UNCONTROLLED(250.60) (H): Primary | ICD-10-CM

## 2020-10-07 DIAGNOSIS — S90.425D BLISTER OF TOE OF LEFT FOOT, SUBSEQUENT ENCOUNTER: ICD-10-CM

## 2020-10-07 PROCEDURE — 99212 OFFICE O/P EST SF 10 MIN: CPT | Performed by: PODIATRIST

## 2020-10-07 NOTE — PATIENT INSTRUCTIONS
Thanks for coming today.  [facility]  [address] [place] [address]    To schedule future appointments in Ortho Clinic, you may call [phone].    To schedule ordered imaging by your provider:   Call [facility] [phone]    To schedule an injection ordered by your provider:  Call [facility] Injection scheduling line: [phone]  [employer] available online at:  [email or web address]    Please call if any further questions or concerns ([phone]).  Clinic hours 8 am to 5 pm.    Return to clinic (call) if symptoms worsen or fail to improve.
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
## 2020-10-07 NOTE — NURSING NOTE
Amos Walker's chief complaint for this visit includes:  Chief Complaint   Patient presents with     RECHECK     Follow up left medial ankle ucler     PCP: Racheal Swift    Referring Provider:  No referring provider defined for this encounter.    BP (!) 147/72 (BP Location: Left arm, Patient Position: Sitting, Cuff Size: Adult Regular)   Pulse 83   SpO2 99%   Data Unavailable     Do you need any medication refills at today's visit? No
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
## 2020-10-09 NOTE — PROGRESS NOTES
His left toe blistering is healing well.  There is no erythema, no drainage, no odor, no calor there.

## 2020-10-14 ENCOUNTER — OFFICE VISIT (OUTPATIENT)
Dept: PODIATRY | Facility: CLINIC | Age: 75
End: 2020-10-14
Payer: COMMERCIAL

## 2020-10-14 VITALS — OXYGEN SATURATION: 99 % | HEART RATE: 90 BPM | DIASTOLIC BLOOD PRESSURE: 77 MMHG | SYSTOLIC BLOOD PRESSURE: 157 MMHG

## 2020-10-14 DIAGNOSIS — L97.322 SKIN ULCER OF LEFT ANKLE WITH FAT LAYER EXPOSED (H): ICD-10-CM

## 2020-10-14 DIAGNOSIS — E11.51 DIABETES MELLITUS WITH PERIPHERAL VASCULAR DISEASE (H): ICD-10-CM

## 2020-10-14 DIAGNOSIS — I87.8 VENOUS STASIS: ICD-10-CM

## 2020-10-14 DIAGNOSIS — E11.49 TYPE II OR UNSPECIFIED TYPE DIABETES MELLITUS WITH NEUROLOGICAL MANIFESTATIONS, NOT STATED AS UNCONTROLLED(250.60) (H): Primary | ICD-10-CM

## 2020-10-14 DIAGNOSIS — E11.610 CHARCOT FOOT DUE TO DIABETES MELLITUS (H): ICD-10-CM

## 2020-10-14 PROCEDURE — 99213 OFFICE O/P EST LOW 20 MIN: CPT | Performed by: PODIATRIST
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
## 2020-10-14 NOTE — NURSING NOTE
Amos Walker's chief complaint for this visit includes:  Chief Complaint   Patient presents with     RECHECK     follow up left medial ankle ulcer     PCP: Racheal Swift    Referring Provider:  No referring provider defined for this encounter.    BP (!) 157/77 (BP Location: Left arm, Patient Position: Sitting, Cuff Size: Adult Regular)   Pulse 90   SpO2 99%   Data Unavailable     Do you need any medication refills at today's visit? No

## 2020-10-14 NOTE — PATIENT INSTRUCTIONS
Thanks for coming today.  [facility]  [address] [place] [address]    To schedule future appointments in Ortho Clinic, you may call [phone].    To schedule ordered imaging by your provider:   Call [facility] [phone]    To schedule an injection ordered by your provider:  Call [facility] Injection scheduling line: [phone]  [facility] available online at:  [email or web address]    Please call if any further questions or concerns ([phone]).  Clinic hours 8 am to 5 pm.    Return to clinic (call) if symptoms worsen or fail to improve.
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
## 2020-10-22 ENCOUNTER — MYC REFILL (OUTPATIENT)
Dept: INTERNAL MEDICINE | Facility: CLINIC | Age: 75
End: 2020-10-22

## 2020-10-22 DIAGNOSIS — I25.83 CORONARY ARTERY DISEASE DUE TO LIPID RICH PLAQUE: ICD-10-CM

## 2020-10-22 DIAGNOSIS — E11.40 TYPE 2 DIABETES, CONTROLLED, WITH NEUROPATHY (H): ICD-10-CM

## 2020-10-22 DIAGNOSIS — I73.9 PVD (PERIPHERAL VASCULAR DISEASE) (H): ICD-10-CM

## 2020-10-22 DIAGNOSIS — I25.10 CORONARY ARTERY DISEASE DUE TO LIPID RICH PLAQUE: ICD-10-CM

## 2020-10-22 DIAGNOSIS — I73.9 PERIPHERAL VASCULAR DISEASE, UNSPECIFIED (H): ICD-10-CM

## 2020-10-22 RX ORDER — CLOPIDOGREL BISULFATE 75 MG/1
75 TABLET ORAL EVERY EVENING
Qty: 90 TABLET | Refills: 3 | Status: SHIPPED | OUTPATIENT
Start: 2020-10-22 | End: 2021-11-30

## 2020-10-22 RX ORDER — SIMVASTATIN 40 MG
40 TABLET ORAL AT BEDTIME
Qty: 90 TABLET | Refills: 3 | Status: SHIPPED | OUTPATIENT
Start: 2020-10-22 | End: 2021-08-25

## 2020-10-22 NOTE — TELEPHONE ENCOUNTER
clopidogrel (PLAVIX) 75 MG tablet   Last Written Prescription Date:  10/4/2019  Last Fill Quantity: 90,   # refills: 3     simvastatin (ZOCOR) 40 MG tablet  Last Written Prescription Date:  7/23/20  Last Fill Quantity: 90,   # refills: 0  LDL 9/2/20  Last Office Visit : 10/5/20  Future Office visit:  1/7/2021 05/19/20  0915    HGB 11.8*   HGB reviewed by Dr Swift and Ashley( 6/3/20), will see Ashley again 12/2/20

## 2020-10-26 RX ORDER — CLOPIDOGREL BISULFATE 75 MG/1
TABLET ORAL
Qty: 90 TABLET | Refills: 3 | OUTPATIENT
Start: 2020-10-26

## 2020-10-26 RX ORDER — SIMVASTATIN 40 MG
TABLET ORAL
Qty: 90 TABLET | Refills: 0 | OUTPATIENT
Start: 2020-10-26

## 2020-10-28 ENCOUNTER — MYC REFILL (OUTPATIENT)
Dept: INTERNAL MEDICINE | Facility: CLINIC | Age: 75
End: 2020-10-28

## 2020-10-28 DIAGNOSIS — E11.40 TYPE 2 DIABETES, CONTROLLED, WITH NEUROPATHY (H): ICD-10-CM

## 2020-10-29 RX ORDER — DULAGLUTIDE 1.5 MG/.5ML
1.5 INJECTION, SOLUTION SUBCUTANEOUS
Qty: 6 ML | Refills: 1 | Status: SHIPPED | OUTPATIENT
Start: 2020-10-29 | End: 2021-07-16

## 2020-11-04 ENCOUNTER — OFFICE VISIT (OUTPATIENT)
Dept: PODIATRY | Facility: CLINIC | Age: 75
End: 2020-11-04
Payer: COMMERCIAL

## 2020-11-04 VITALS — HEART RATE: 79 BPM | OXYGEN SATURATION: 99 % | DIASTOLIC BLOOD PRESSURE: 69 MMHG | SYSTOLIC BLOOD PRESSURE: 150 MMHG

## 2020-11-04 DIAGNOSIS — E11.51 DIABETES MELLITUS WITH PERIPHERAL VASCULAR DISEASE (H): ICD-10-CM

## 2020-11-04 DIAGNOSIS — I87.8 VENOUS STASIS: ICD-10-CM

## 2020-11-04 DIAGNOSIS — L97.322 SKIN ULCER OF LEFT ANKLE WITH FAT LAYER EXPOSED (H): ICD-10-CM

## 2020-11-04 DIAGNOSIS — E11.49 TYPE II OR UNSPECIFIED TYPE DIABETES MELLITUS WITH NEUROLOGICAL MANIFESTATIONS, NOT STATED AS UNCONTROLLED(250.60) (H): Primary | ICD-10-CM

## 2020-11-04 PROCEDURE — 99213 OFFICE O/P EST LOW 20 MIN: CPT | Performed by: PODIATRIST

## 2020-11-04 NOTE — NURSING NOTE
Amos Walker's chief complaint for this visit includes:  Chief Complaint   Patient presents with     RECHECK     ulcer, left medial ankle     PCP: Racheal Swift    Referring Provider:  No referring provider defined for this encounter.    BP (!) 150/69 (BP Location: Left arm, Patient Position: Sitting, Cuff Size: Adult Regular)   Pulse 79   SpO2 99%   Data Unavailable     Do you need any medication refills at today's visit? Yesenia Vidal CMA
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
## 2020-11-04 NOTE — PATIENT INSTRUCTIONS
Thanks for coming today.  Ortho/Sports Medicine Clinic  [address] [place] [address]    To schedule future appointments in Ortho Clinic, you may call [phone].    To schedule ordered imaging by your provider:   Call Central Imaging [phone]    To schedule an injection ordered by your provider:  Call Central Imaging Injection scheduling line: [phone]  [email or web address] available online at:  [email or web address]    Please call if any further questions or concerns ([phone]).  Clinic hours 8 am to 5 pm.    Return to clinic (call) if symptoms worsen or fail to improve.

## 2020-11-18 ENCOUNTER — OFFICE VISIT (OUTPATIENT)
Dept: PODIATRY | Facility: CLINIC | Age: 75
End: 2020-11-18
Payer: COMMERCIAL

## 2020-11-18 VITALS — OXYGEN SATURATION: 99 % | SYSTOLIC BLOOD PRESSURE: 129 MMHG | DIASTOLIC BLOOD PRESSURE: 67 MMHG | HEART RATE: 67 BPM

## 2020-11-18 DIAGNOSIS — L97.322 SKIN ULCER OF LEFT ANKLE WITH FAT LAYER EXPOSED (H): ICD-10-CM

## 2020-11-18 DIAGNOSIS — I87.8 VENOUS STASIS: ICD-10-CM

## 2020-11-18 DIAGNOSIS — E11.49 TYPE II OR UNSPECIFIED TYPE DIABETES MELLITUS WITH NEUROLOGICAL MANIFESTATIONS, NOT STATED AS UNCONTROLLED(250.60) (H): Primary | ICD-10-CM

## 2020-11-18 DIAGNOSIS — E11.51 DIABETES MELLITUS WITH PERIPHERAL VASCULAR DISEASE (H): ICD-10-CM

## 2020-11-18 PROCEDURE — 99213 OFFICE O/P EST LOW 20 MIN: CPT | Performed by: PODIATRIST

## 2020-11-18 NOTE — NURSING NOTE
Amos Walker's chief complaint for this visit includes:  Chief Complaint   Patient presents with     RECHECK     left ankle ulcer follow up      PCP: Racheal Swift    Referring Provider:  No referring provider defined for this encounter.    /67   Pulse 67   SpO2 99%   Data Unavailable     Do you need any medication refills at today's visit?
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
## 2020-11-27 ENCOUNTER — PATIENT OUTREACH (OUTPATIENT)
Dept: ONCOLOGY | Facility: CLINIC | Age: 75
End: 2020-11-27

## 2020-11-27 NOTE — PROGRESS NOTES
Spoke with pt regarding need for lab draw next Monday or Tuesday before Wednesday video visit.  Pt stated he would try to call  [facility] for appt on Monday. Discussed other potential options as well.  Pt appreciated call.

## 2020-11-30 ENCOUNTER — MYC MEDICAL ADVICE (OUTPATIENT)
Dept: TRANSPLANT | Facility: CLINIC | Age: 75
End: 2020-11-30

## 2020-12-01 ENCOUNTER — TELEPHONE (OUTPATIENT)
Dept: PODIATRY | Facility: CLINIC | Age: 75
End: 2020-12-01

## 2020-12-01 ENCOUNTER — TELEPHONE (OUTPATIENT)
Dept: OTHER | Facility: CLINIC | Age: 75
End: 2020-12-01

## 2020-12-01 ENCOUNTER — OFFICE VISIT (OUTPATIENT)
Dept: PODIATRY | Facility: CLINIC | Age: 75
End: 2020-12-01
Payer: COMMERCIAL

## 2020-12-01 VITALS — HEART RATE: 53 BPM | DIASTOLIC BLOOD PRESSURE: 65 MMHG | OXYGEN SATURATION: 100 % | SYSTOLIC BLOOD PRESSURE: 149 MMHG

## 2020-12-01 DIAGNOSIS — D47.2 MGUS (MONOCLONAL GAMMOPATHY OF UNKNOWN SIGNIFICANCE): ICD-10-CM

## 2020-12-01 DIAGNOSIS — I87.8 VENOUS STASIS: ICD-10-CM

## 2020-12-01 DIAGNOSIS — E11.51 DIABETES MELLITUS WITH PERIPHERAL VASCULAR DISEASE (H): ICD-10-CM

## 2020-12-01 DIAGNOSIS — E11.49 TYPE II OR UNSPECIFIED TYPE DIABETES MELLITUS WITH NEUROLOGICAL MANIFESTATIONS, NOT STATED AS UNCONTROLLED(250.60) (H): Primary | ICD-10-CM

## 2020-12-01 DIAGNOSIS — L97.322 SKIN ULCER OF LEFT ANKLE WITH FAT LAYER EXPOSED (H): ICD-10-CM

## 2020-12-01 LAB
ALBUMIN SERPL-MCNC: 3.7 G/DL (ref 3.4–5)
ALP SERPL-CCNC: 133 U/L (ref 40–150)
ALT SERPL W P-5'-P-CCNC: 15 U/L (ref 0–70)
ANION GAP SERPL CALCULATED.3IONS-SCNC: 2 MMOL/L (ref 3–14)
AST SERPL W P-5'-P-CCNC: 19 U/L (ref 0–45)
BASOPHILS # BLD AUTO: 0.1 10E9/L (ref 0–0.2)
BASOPHILS NFR BLD AUTO: 1.2 %
BILIRUB SERPL-MCNC: 0.5 MG/DL (ref 0.2–1.3)
BUN SERPL-MCNC: 39 MG/DL (ref 7–30)
CALCIUM SERPL-MCNC: 8.6 MG/DL (ref 8.5–10.1)
CHLORIDE SERPL-SCNC: 106 MMOL/L (ref 94–109)
CO2 SERPL-SCNC: 30 MMOL/L (ref 20–32)
CREAT SERPL-MCNC: 1.22 MG/DL (ref 0.66–1.25)
DIFFERENTIAL METHOD BLD: ABNORMAL
EOSINOPHIL # BLD AUTO: 0.3 10E9/L (ref 0–0.7)
EOSINOPHIL NFR BLD AUTO: 4.2 %
ERYTHROCYTE [DISTWIDTH] IN BLOOD BY AUTOMATED COUNT: 12.9 % (ref 10–15)
GFR SERPL CREATININE-BSD FRML MDRD: 58 ML/MIN/{1.73_M2}
GLUCOSE SERPL-MCNC: 89 MG/DL (ref 70–99)
HCT VFR BLD AUTO: 36.5 % (ref 40–53)
HGB BLD-MCNC: 11.9 G/DL (ref 13.3–17.7)
IMM GRANULOCYTES # BLD: 0.1 10E9/L (ref 0–0.4)
IMM GRANULOCYTES NFR BLD: 1.2 %
LDH SERPL L TO P-CCNC: 151 U/L (ref 85–227)
LYMPHOCYTES # BLD AUTO: 1.5 10E9/L (ref 0.8–5.3)
LYMPHOCYTES NFR BLD AUTO: 24.8 %
MCH RBC QN AUTO: 31.7 PG (ref 26.5–33)
MCHC RBC AUTO-ENTMCNC: 32.6 G/DL (ref 31.5–36.5)
MCV RBC AUTO: 97 FL (ref 78–100)
MONOCYTES # BLD AUTO: 0.6 10E9/L (ref 0–1.3)
MONOCYTES NFR BLD AUTO: 9.7 %
NEUTROPHILS # BLD AUTO: 3.5 10E9/L (ref 1.6–8.3)
NEUTROPHILS NFR BLD AUTO: 58.9 %
PLATELET # BLD AUTO: 157 10E9/L (ref 150–450)
POTASSIUM SERPL-SCNC: 4.1 MMOL/L (ref 3.4–5.3)
PROT SERPL-MCNC: 7.5 G/DL (ref 6.8–8.8)
RBC # BLD AUTO: 3.75 10E12/L (ref 4.4–5.9)
SODIUM SERPL-SCNC: 138 MMOL/L (ref 133–144)
URATE SERPL-MCNC: 6.1 MG/DL (ref 3.5–7.2)
WBC # BLD AUTO: 5.9 10E9/L (ref 4–11)

## 2020-12-01 PROCEDURE — 99213 OFFICE O/P EST LOW 20 MIN: CPT | Performed by: PODIATRIST

## 2020-12-01 PROCEDURE — 83883 ASSAY NEPHELOMETRY NOT SPEC: CPT | Performed by: INTERNAL MEDICINE

## 2020-12-01 PROCEDURE — 84165 PROTEIN E-PHORESIS SERUM: CPT | Performed by: PATHOLOGY

## 2020-12-01 PROCEDURE — 83615 LACTATE (LD) (LDH) ENZYME: CPT | Performed by: INTERNAL MEDICINE

## 2020-12-01 PROCEDURE — 80053 COMPREHEN METABOLIC PANEL: CPT | Performed by: INTERNAL MEDICINE

## 2020-12-01 PROCEDURE — 85025 COMPLETE CBC W/AUTO DIFF WBC: CPT | Performed by: INTERNAL MEDICINE

## 2020-12-01 PROCEDURE — 36415 COLL VENOUS BLD VENIPUNCTURE: CPT | Performed by: INTERNAL MEDICINE

## 2020-12-01 PROCEDURE — 82784 ASSAY IGA/IGD/IGG/IGM EACH: CPT | Performed by: INTERNAL MEDICINE

## 2020-12-01 PROCEDURE — 99N1036 PR STATISTIC TOTAL PROTEIN: Performed by: INTERNAL MEDICINE

## 2020-12-01 PROCEDURE — 86334 IMMUNOFIX E-PHORESIS SERUM: CPT | Performed by: PATHOLOGY

## 2020-12-01 PROCEDURE — 84550 ASSAY OF BLOOD/URIC ACID: CPT | Performed by: INTERNAL MEDICINE

## 2020-12-01 NOTE — TELEPHONE ENCOUNTER
Patient referred by Brayan Mcclain DPM of [facility] for:  peripheral arterial disease recheck with ulcer at ankle.    Diagnoses include:   E11.49 (ICD-10-CM) - Type II or unspecified type diabetes mellitus with neurological manifestations, not stated as uncontrolled(250.60) (H)   E11.51 (ICD-10-CM) - Diabetes mellitus with peripheral vascular disease (H)   I87.8 (ICD-10-CM) - Venous stasis   L97.322 (ICD-10-CM) - Skin ulcer of left ankle with fat layer exposed (H)     History:  9/14/2018  Right Common Femoral Endarterectomy with Bovine Patch Angioplasty, Right Lower Leg Arteriogram, Placement of 6 x 60mm Stent on Right Superficial Femoral Artery;  Surgeon: Augusto Maharaj MD;  [facility]    Last imaging in Chart: Arterial US of RLE dated 11/8/18 9/14/2018  Angiogram(N/A)Procedure: ANGIOGRAM;;  Surgeon: Augusto Maharaj MD;  [facility]    9/14/2018  Angioplasty(N/A)Procedure: ANGIOPLASTY;;  Surgeon: Augusto Maharaj MD;  [facility]

## 2020-12-01 NOTE — TELEPHONE ENCOUNTER
12/8 2nd attempt. Provided phone number  To Schedule a follow up with Vascular phone number [phone].    Cindy Caal   Procedure    Ortho/Sports Med/Pod/Ent/Eye  [facility]   [phone]

## 2020-12-01 NOTE — PATIENT INSTRUCTIONS
Thanks for coming today.  Ortho/Sports Medicine Clinic  [address] [place] [address]    To schedule future appointments in Ortho Clinic, you may call [phone].    To schedule ordered imaging by your provider:   Call Central Imaging [phone]    To schedule an injection ordered by your provider:  Call Central Imaging Injection scheduling line: [phone]  [email or web address] available online at:  [email or web address]    Please call if any further questions or concerns ([phone]).  Clinic hours 8 am to 5 pm.    Return to clinic (call) if symptoms worsen or fail to improve.
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
## 2020-12-01 NOTE — TELEPHONE ENCOUNTER
Brief review of chart.  Patient was last seen by Dr. Maharaj on 5/9/19 with recommendation to follow up in 6 months with CAROL and toe pressures.      Will need to determine if patient would like to continue care with Dr. Maharaj or if he would like to re-establish care with another vascular surgeon.  LVM to have patient contact us with an update of what he would prefer.    ALEXSANDRA HaskinsN, RN-[facility] [place]
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
## 2020-12-01 NOTE — NURSING NOTE
Amos Walker's chief complaint for this visit includes:  Chief Complaint   Patient presents with     RECHECK     ulcer, left medial ankle     PCP: Racheal Swift    Referring Provider:  No referring provider defined for this encounter.    BP (!) 149/65 (BP Location: Right arm, Patient Position: Sitting, Cuff Size: Adult Regular)   Pulse 53   SpO2 100%   Data Unavailable     Do you need any medication refills at today's visit? Yesenia Vidal CMA

## 2020-12-02 ENCOUNTER — PATIENT OUTREACH (OUTPATIENT)
Dept: ONCOLOGY | Facility: CLINIC | Age: 75
End: 2020-12-02

## 2020-12-02 ENCOUNTER — VIRTUAL VISIT (OUTPATIENT)
Dept: TRANSPLANT | Facility: CLINIC | Age: 75
End: 2020-12-02
Attending: INTERNAL MEDICINE
Payer: COMMERCIAL

## 2020-12-02 DIAGNOSIS — D47.2 MGUS (MONOCLONAL GAMMOPATHY OF UNKNOWN SIGNIFICANCE): Primary | ICD-10-CM

## 2020-12-02 LAB
ALBUMIN SERPL ELPH-MCNC: 4 G/DL (ref 3.7–5.1)
ALPHA1 GLOB SERPL ELPH-MCNC: 0.2 G/DL (ref 0.2–0.4)
ALPHA2 GLOB SERPL ELPH-MCNC: 0.8 G/DL (ref 0.5–0.9)
B-GLOBULIN SERPL ELPH-MCNC: 0.9 G/DL (ref 0.6–1)
GAMMA GLOB SERPL ELPH-MCNC: 1.4 G/DL (ref 0.7–1.6)
IGA SERPL-MCNC: 376 MG/DL (ref 84–499)
IGG SERPL-MCNC: 1404 MG/DL (ref 610–1616)
IGM SERPL-MCNC: 29 MG/DL (ref 35–242)
KAPPA LC UR-MCNC: 6.66 MG/DL (ref 0.33–1.94)
KAPPA LC/LAMBDA SER: 2.53 {RATIO} (ref 0.26–1.65)
LAMBDA LC SERPL-MCNC: 2.63 MG/DL (ref 0.57–2.63)
M PROTEIN SERPL ELPH-MCNC: 0.1 G/DL
PROT PATTERN SERPL ELPH-IMP: ABNORMAL

## 2020-12-02 PROCEDURE — 99213 OFFICE O/P EST LOW 20 MIN: CPT | Mod: 95 | Performed by: INTERNAL MEDICINE

## 2020-12-02 NOTE — PROGRESS NOTES
Spoke with ALIS Zimmer, she will re-order and add to yesterday's labs.  Updated Dr. Sheng Ramirez as he sent in basket message about issue (lab not drawn yesterday.

## 2020-12-02 NOTE — PROGRESS NOTES
"Amos Walker is a 75 year old adult who is being evaluated via a billable video visit.      The patient has been notified of following:     \"This video visit will be conducted via a call between you and your physician/provider. We have found that certain health care needs can be provided without the need for an in-person physical exam.  This service lets us provide the care you need with a video conversation.  If a prescription is necessary we can send it directly to your pharmacy.  If lab work is needed we can place an order for that and you can then stop by our lab to have the test done at a later time.    Video visits are billed at different rates depending on your insurance coverage.  Please reach out to your insurance provider with any questions.    If during the course of the call the physician/provider feels a video visit is not appropriate, you will not be charged for this service.\"    Patient has given verbal consent for Video visit? Yes  How would you like to obtain your AVS? MyChart  If you are dropped from the video visit, the video invite should be resent to: Send to e-mail at: [email or web address]  Will anyone else be joining your video visit? No       SHANNEN Christopher      I SPENT 20 MINUTES TRYING TO GET VIDEO AND ULTIMATELY DI TELEPHONE VIST  GV      HISTORY OF PRESENT ILLNESS:  I started this as a video visit; however, there were technical issues with the video, the patient's computer did not seem to be responding and then I lost his image and I continued it as a telephone visit.  Mr. Walker is a 74-year-old gentleman referred for evaluation of anemia and monoclonal gammopathy.  He has a complicated medical history highlighted by type 2 diabetes, hypertension, coronary and peripheral artery disease, Charcot foot, chronic leg ulcers, chronic kidney disease and history of heavy tobacco use.  He has been noted to be anemic for quite a while.  He tells me that he was anemic several years ago when he " was in [place].  He had some blood in the stool.  He did have colonoscopies done.  In reviewing his chart, he has had hemoglobins in the low 9/high 8 range on and off since 2017.  Occasionally his hemoglobin gets a little bit higher.  His most recent hemoglobin on 02/05 was 9.0.  He denies any active bleeding now.  He does have foot ulcers.  He has had a lot of problems with infections.  He has had a history of MRSA involving his foot which he feels led ultimately to his Charcot foot and his diabetic ulcers.  He has not had a history of B12 deficiency.  He was noted in 2019 to have a monoclonal peak of 0.2.  There were no light chains in the urine.  His immunoglobulin levels were elevated in IgA and IgM.  His IgA was 604 and IgG was 1890.  There is an immunofixation of the monoclonal peak, which showed a free light chain and kappa chain type in his serum and an IgG kappa also in the serum.  He had a rather severe infection in 01/2020 with sepsis, acute metabolic encephalopathy, respiratory failure secondary to Legionella community-acquired pneumonia.  He was treated aggressively with antibiotics and ultimately was discharged.  He has had issues with type 2 diabetes, on Lantus and Humalog.  He also has had hypertension.  He sees Dr. Mcclain regularly for his right foot ulcer about every 2 weeks.  He has a Charcot foot.  He denies any fever or chills.  He denies any nausea, vomiting or diarrhea.     INTERVAL HISTORY  Doing well  Charcot foot ulcer healed but now has ulcer on inner ankle of left leg.Diabetes is better controlled  No COVID sx no cough Does have a runny nose for past years.    PAST MEDICAL HISTORY:  Remarkable for coronary artery disease, chronic kidney disease, emphysema, heart disease, hyperlipidemia, peripheral artery disease, type 2 diabetes, venous ulcerations, chronic kidney disease, hypertension, atherosclerosis of native arteries of right leg, Charcot foot due to diabetes, angioplasty, a left  total hip replacement, a femoral endarterectomy on the right, a cervical disk fusion, a right foot orthopedic surgery, and bilateral cataract surgery.      FAMILY HISTORY:  Remarkable for his father having colon cancer and kidney disease.  Mother had kidney and cardiac issues.  His son had a myocardial infarction at age 40.  His brother has macular degeneration.      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  He smokes 2 packs per day for at least 25 years.  No alcohol use.  He is .  He has 4 children.      MEDICATIONS:  See EMR.      REVIEW OF SYSTEMS:   EYES:  Vision is much better since cataract surgery.   EARS:  Hearing is okay.   RESPIRATORY:  He has a chronic cough.  He feels that he has some postnasal drip.   CARDIAC:  No angina.   GASTROINTESTINAL:  No GERD.  No constipation, no melena, no hematochezia.   GENITOURINARY:  He has ED.  He does not have much in the way of nocturia.   NEUROLOGIC:  He has peripheral neuropathy.  No seizures or stroke.   MUSCULOSKELETAL:  He has the Charcot joint with Charcot foot.   PSYCHIATRIC:  His affect is flat.  He is scheduled to see a psychologist.      ALLERGIES:  Lisinopril, neomycin, methylchloroisothiazolinone, povidone-iodine.        PHYSICAL EXAMINATION:  Telephone       Results for RASHEED SORIANO (MRN [id number]) as of 12/2/2020 13:11   Ref. Range 12/1/2020 10:42   Sodium Latest Ref Range: 133 - 144 mmol/L 138   Potassium Latest Ref Range: 3.4 - 5.3 mmol/L 4.1   Chloride Latest Ref Range: 94 - 109 mmol/L 106   Carbon Dioxide Latest Ref Range: 20 - 32 mmol/L 30   Urea Nitrogen Latest Ref Range: 7 - 30 mg/dL 39 (H)   Creatinine Latest Ref Range: 0.66 - 1.25 mg/dL 1.22   GFR Estimate Latest Ref Range: >60 mL/min/1.73_m2 58 (L)   GFR Estimate If Black Latest Ref Range: >60 mL/min/1.73_m2 67   Calcium Latest Ref Range: 8.5 - 10.1 mg/dL 8.6   Anion Gap Latest Ref Range: 3 - 14 mmol/L 2 (L)   Albumin Latest Ref Range: 3.4 - 5.0 g/dL 3.7   Protein  Total Latest Ref Range: 6.8 - 8.8 g/dL 7.5   Bilirubin Total Latest Ref Range: 0.2 - 1.3 mg/dL 0.5   Alkaline Phosphatase Latest Ref Range: 40 - 150 U/L 133   ALT Latest Ref Range: 0 - 70 U/L 15   AST Latest Ref Range: 0 - 45 U/L 19   Lactate Dehydrogenase Latest Ref Range: 85 - 227 U/L 151   Uric Acid Latest Ref Range: 3.5 - 7.2 mg/dL 6.1   Glucose Latest Ref Range: 70 - 99 mg/dL 89   WBC Latest Ref Range: 4.0 - 11.0 10e9/L 5.9   Hemoglobin Latest Ref Range: 13.3 - 17.7 g/dL 11.9 (L)   Hematocrit Latest Ref Range: 40.0 - 53.0 % 36.5 (L)   Platelet Count Latest Ref Range: 150 - 450 10e9/L 157   RBC Count Latest Ref Range: 4.4 - 5.9 10e12/L 3.75 (L)   MCV Latest Ref Range: 78 - 100 fl 97   MCH Latest Ref Range: 26.5 - 33.0 pg 31.7   MCHC Latest Ref Range: 31.5 - 36.5 g/dL 32.6   RDW Latest Ref Range: 10.0 - 15.0 % 12.9   Diff Method Unknown Automated Method   % Neutrophils Latest Units: % 58.9   % Lymphocytes Latest Units: % 24.8   % Monocytes Latest Units: % 9.7   % Eosinophils Latest Units: % 4.2   % Basophils Latest Units: % 1.2   % Immature Granulocytes Latest Units: % 1.2   Absolute Neutrophil Latest Ref Range: 1.6 - 8.3 10e9/L 3.5   Absolute Lymphocytes Latest Ref Range: 0.8 - 5.3 10e9/L 1.5   Absolute Monocytes Latest Ref Range: 0.0 - 1.3 10e9/L 0.6   Absolute Eosinophils Latest Ref Range: 0.0 - 0.7 10e9/L 0.3   Absolute Basophils Latest Ref Range: 0.0 - 0.2 10e9/L 0.1   Abs Immature Granulocytes Latest Ref Range: 0 - 0.4 10e9/L 0.1   Albumin Fraction Latest Ref Range: 3.7 - 5.1 g/dL 4.0   Alpha 1 Fraction Latest Ref Range: 0.2 - 0.4 g/dL 0.2   Alpha 2 Fraction Latest Ref Range: 0.5 - 0.9 g/dL 0.8   Beta Fraction Latest Ref Range: 0.6 - 1.0 g/dL 0.9   ELP Interpretation: Unknown Possible very sma...   Gamma Fraction Latest Ref Range: 0.7 - 1.6 g/dL 1.4   IGA Latest Ref Range: 84 - 499 mg/dL 376   IGG Latest Ref Range: 610 - 1,616 mg/dL 1,404   IGM Latest Ref Range: 35 - 242 mg/dL 29 (L)   [place] Free Lt Chain  Latest Ref Range: 0.33 - 1.94 mg/dL 6.66 (H)   Kappa Lambda Ratio Latest Ref Range: 0.26 - 1.65  2.53 (H)   Lambda Free Lt Chain Latest Ref Range: 0.57 - 2.63 mg/dL 2.63   Monoclonal Peak Latest Ref Range: 0.0 g/dL 0.1 (H)     ASSESSMENT:   1.  Chronic anemia, likely anemia of chronic inflammation.   2.  Monoclonal gammopathy of uncertain significance.   3.  Type 2 diabetes.   4.  Coronary artery disease.   5.  Peripheral artery disease.   6.  Charcot foot.  7.   Smoking      Reviewing the lab data Amos's M spike is stable at 0.1. His hemoglobin is increased to 11.9  I suggest he has anemia of chronic inflammation related to his spesis and Charcot foot. Epo and testosterone are ok. I explained the MGUS is common in individuals over 70. May be related to his chronic inflammation but no evidence for multiple myeloma , no CRAB criteria really met. Will repeat labs in 12 mo and see him then Discussed stopping smoking an and considering B vitamins and anti oxidants like Vitamin with hx of smoking and lowish B12.         I spent a total of 12 minutes on the phone and reviewing the charg with Amos D Bertosarah beth during today's office visit. Over 50% of this time was spent counseling the patient and coordinating the care regarding anemia and MGUS  Kaleb Ramirez MD

## 2020-12-02 NOTE — LETTER
"    12/2/2020         RE: Amos Walker  [address]  [place] [address]        Dear Colleague,    Thank you for referring your patient, Amos Walker, to the [facility] [place]. Please see a copy of my visit note below.    Amos Walker is a 75 year old adult who is being evaluated via a billable video visit.      The patient has been notified of following:     \"This video visit will be conducted via a call between you and your physician/provider. We have found that certain health care needs can be provided without the need for an in-person physical exam.  This service lets us provide the care you need with a video conversation.  If a prescription is necessary we can send it directly to your pharmacy.  If lab work is needed we can place an order for that and you can then stop by our lab to have the test done at a later time.    Video visits are billed at different rates depending on your insurance coverage.  Please reach out to your insurance provider with any questions.    If during the course of the call the physician/provider feels a video visit is not appropriate, you will not be charged for this service.\"    Patient has given verbal consent for Video visit? Yes  How would you like to obtain your AVS? MyChart  If you are dropped from the video visit, the video invite should be resent to: Send to e-mail at: [email or web address]  Will anyone else be joining your video visit? SHANNEN Junior      I SPENT 20 MINUTES TRYING TO GET VIDEO AND ULTIMATELY DI TELEPHONE VIST  GV      HISTORY OF PRESENT ILLNESS:  I started this as a video visit; however, there were technical issues with the video, the patient's computer did not seem to be responding and then I lost his image and I continued it as a telephone visit.  Mr. Walker is a 74-year-old gentleman referred for evaluation of anemia and monoclonal gammopathy.  He has a complicated medical history " highlighted by type 2 diabetes, hypertension, coronary and peripheral artery disease, Charcot foot, chronic leg ulcers, chronic kidney disease and history of heavy tobacco use.  He has been noted to be anemic for quite a while.  He tells me that he was anemic several years ago when he was in [place].  He had some blood in the stool.  He did have colonoscopies done.  In reviewing his chart, he has had hemoglobins in the low 9/high 8 range on and off since 2017.  Occasionally his hemoglobin gets a little bit higher.  His most recent hemoglobin on 02/05 was 9.0.  He denies any active bleeding now.  He does have foot ulcers.  He has had a lot of problems with infections.  He has had a history of MRSA involving his foot which he feels led ultimately to his Charcot foot and his diabetic ulcers.  He has not had a history of B12 deficiency.  He was noted in 2019 to have a monoclonal peak of 0.2.  There were no light chains in the urine.  His immunoglobulin levels were elevated in IgA and IgM.  His IgA was 604 and IgG was 1890.  There is an immunofixation of the monoclonal peak, which showed a free light chain and kappa chain type in his serum and an IgG kappa also in the serum.  He had a rather severe infection in 01/2020 with sepsis, acute metabolic encephalopathy, respiratory failure secondary to Legionella community-acquired pneumonia.  He was treated aggressively with antibiotics and ultimately was discharged.  He has had issues with type 2 diabetes, on Lantus and Humalog.  He also has had hypertension.  He sees Dr. Mcclain regularly for his right foot ulcer about every 2 weeks.  He has a Charcot foot.  He denies any fever or chills.  He denies any nausea, vomiting or diarrhea.     INTERVAL HISTORY  Doing well  Charcot foot ulcer healed but now has ulcer on inner ankle of left leg.Diabetes is better controlled  No COVID sx no cough Does have a runny nose for past years.    PAST MEDICAL HISTORY:  Remarkable for coronary  artery disease, chronic kidney disease, emphysema, heart disease, hyperlipidemia, peripheral artery disease, type 2 diabetes, venous ulcerations, chronic kidney disease, hypertension, atherosclerosis of native arteries of right leg, Charcot foot due to diabetes, angioplasty, a left total hip replacement, a femoral endarterectomy on the right, a cervical disk fusion, a right foot orthopedic surgery, and bilateral cataract surgery.      FAMILY HISTORY:  Remarkable for his father having colon cancer and kidney disease.  Mother had kidney and cardiac issues.  His son had a myocardial infarction at age 40.  His brother has macular degeneration.      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  He smokes 2 packs per day for at least 25 years.  No alcohol use.  He is .  He has 4 children.      MEDICATIONS:  See EMR.      REVIEW OF SYSTEMS:   EYES:  Vision is much better since cataract surgery.   EARS:  Hearing is okay.   RESPIRATORY:  He has a chronic cough.  He feels that he has some postnasal drip.   CARDIAC:  No angina.   GASTROINTESTINAL:  No GERD.  No constipation, no melena, no hematochezia.   GENITOURINARY:  He has ED.  He does not have much in the way of nocturia.   NEUROLOGIC:  He has peripheral neuropathy.  No seizures or stroke.   MUSCULOSKELETAL:  He has the Charcot joint with Charcot foot.   PSYCHIATRIC:  His affect is flat.  He is scheduled to see a psychologist.      ALLERGIES:  Lisinopril, neomycin, methylchloroisothiazolinone, povidone-iodine.        PHYSICAL EXAMINATION:  Telephone       Results for RASHEED SORIANO EDI (MRN [id number]) as of 12/2/2020 13:11   Ref. Range 12/1/2020 10:42   Sodium Latest Ref Range: 133 - 144 mmol/L 138   Potassium Latest Ref Range: 3.4 - 5.3 mmol/L 4.1   Chloride Latest Ref Range: 94 - 109 mmol/L 106   Carbon Dioxide Latest Ref Range: 20 - 32 mmol/L 30   Urea Nitrogen Latest Ref Range: 7 - 30 mg/dL 39 (H)   Creatinine Latest Ref Range: 0.66 - 1.25 mg/dL  1.22   GFR Estimate Latest Ref Range: >60 mL/min/1.73_m2 58 (L)   GFR Estimate If Black Latest Ref Range: >60 mL/min/1.73_m2 67   Calcium Latest Ref Range: 8.5 - 10.1 mg/dL 8.6   Anion Gap Latest Ref Range: 3 - 14 mmol/L 2 (L)   Albumin Latest Ref Range: 3.4 - 5.0 g/dL 3.7   Protein Total Latest Ref Range: 6.8 - 8.8 g/dL 7.5   Bilirubin Total Latest Ref Range: 0.2 - 1.3 mg/dL 0.5   Alkaline Phosphatase Latest Ref Range: 40 - 150 U/L 133   ALT Latest Ref Range: 0 - 70 U/L 15   AST Latest Ref Range: 0 - 45 U/L 19   Lactate Dehydrogenase Latest Ref Range: 85 - 227 U/L 151   Uric Acid Latest Ref Range: 3.5 - 7.2 mg/dL 6.1   Glucose Latest Ref Range: 70 - 99 mg/dL 89   WBC Latest Ref Range: 4.0 - 11.0 10e9/L 5.9   Hemoglobin Latest Ref Range: 13.3 - 17.7 g/dL 11.9 (L)   Hematocrit Latest Ref Range: 40.0 - 53.0 % 36.5 (L)   Platelet Count Latest Ref Range: 150 - 450 10e9/L 157   RBC Count Latest Ref Range: 4.4 - 5.9 10e12/L 3.75 (L)   MCV Latest Ref Range: 78 - 100 fl 97   MCH Latest Ref Range: 26.5 - 33.0 pg 31.7   MCHC Latest Ref Range: 31.5 - 36.5 g/dL 32.6   RDW Latest Ref Range: 10.0 - 15.0 % 12.9   Diff Method Unknown Automated Method   % Neutrophils Latest Units: % 58.9   % Lymphocytes Latest Units: % 24.8   % Monocytes Latest Units: % 9.7   % Eosinophils Latest Units: % 4.2   % Basophils Latest Units: % 1.2   % Immature Granulocytes Latest Units: % 1.2   Absolute Neutrophil Latest Ref Range: 1.6 - 8.3 10e9/L 3.5   Absolute Lymphocytes Latest Ref Range: 0.8 - 5.3 10e9/L 1.5   Absolute Monocytes Latest Ref Range: 0.0 - 1.3 10e9/L 0.6   Absolute Eosinophils Latest Ref Range: 0.0 - 0.7 10e9/L 0.3   Absolute Basophils Latest Ref Range: 0.0 - 0.2 10e9/L 0.1   Abs Immature Granulocytes Latest Ref Range: 0 - 0.4 10e9/L 0.1   Albumin Fraction Latest Ref Range: 3.7 - 5.1 g/dL 4.0   Alpha 1 Fraction Latest Ref Range: 0.2 - 0.4 g/dL 0.2   Alpha 2 Fraction Latest Ref Range: 0.5 - 0.9 g/dL 0.8   Beta Fraction Latest Ref Range:  0.6 - 1.0 g/dL 0.9   ELP Interpretation: Unknown Possible very sma...   Gamma Fraction Latest Ref Range: 0.7 - 1.6 g/dL 1.4   IGA Latest Ref Range: 84 - 499 mg/dL 376   IGG Latest Ref Range: 610 - 1,616 mg/dL 1,404   IGM Latest Ref Range: 35 - 242 mg/dL 29 (L)   [place] Free Lt Chain Latest Ref Range: 0.33 - 1.94 mg/dL 6.66 (H)   Kappa Lambda Ratio Latest Ref Range: 0.26 - 1.65  2.53 (H)   Lambda Free Lt Chain Latest Ref Range: 0.57 - 2.63 mg/dL 2.63   Monoclonal Peak Latest Ref Range: 0.0 g/dL 0.1 (H)     ASSESSMENT:   1.  Chronic anemia, likely anemia of chronic inflammation.   2.  Monoclonal gammopathy of uncertain significance.   3.  Type 2 diabetes.   4.  Coronary artery disease.   5.  Peripheral artery disease.   6.  Charcot foot.  7.   Smoking      Reviewing the lab data Amos's M spike is stable at 0.1. His hemoglobin is increased to 11.9  I suggest he has anemia of chronic inflammation related to his spesis and Charcot foot. Epo and testosterone are ok. I explained the MGUS is common in individuals over 70. May be related to his chronic inflammation but no evidence for multiple myeloma , no CRAB criteria really met. Will repeat labs in 12 mo and see him then Discussed stopping smoking an and considering B vitamins and anti oxidants like Vitamin with hx of smoking and lowish B12.         I spent a total of 12 minutes on the phone and reviewing the charg with Amos D Bertosarah beth during today's office visit. Over 50% of this time was spent counseling the patient and coordinating the care regarding anemia and MGUS  Kaleb Ramirez MD

## 2020-12-03 LAB
IGA SERPL-MCNC: 388 MG/DL (ref 84–499)
IGG SERPL-MCNC: 1431 MG/DL (ref 610–1616)
IGM SERPL-MCNC: 26 MG/DL (ref 35–242)
PROT PATTERN SERPL IFE-IMP: ABNORMAL

## 2020-12-08 DIAGNOSIS — Z01.818 ENCOUNTER FOR OTHER PREPROCEDURAL EXAMINATION: ICD-10-CM

## 2020-12-08 DIAGNOSIS — I70.233 ATHEROSCLEROSIS OF NATIVE ARTERIES OF RIGHT LEG WITH ULCERATION OF ANKLE (H): Primary | ICD-10-CM

## 2020-12-08 DIAGNOSIS — I83.009 VENOUS STASIS ULCER (H): ICD-10-CM

## 2020-12-08 DIAGNOSIS — L97.909 VENOUS STASIS ULCER (H): ICD-10-CM

## 2020-12-08 NOTE — TELEPHONE ENCOUNTER
Spoke with patient.  He would like to continue his vascular care through Dr. Maharaj.  Appears pt had contacted Dr. Maharaj's office today.      ALEXSANDRA HaskinsN, RN-[facility]

## 2020-12-14 DIAGNOSIS — I70.249: Primary | ICD-10-CM

## 2020-12-15 ENCOUNTER — ANCILLARY PROCEDURE (OUTPATIENT)
Dept: ULTRASOUND IMAGING | Facility: CLINIC | Age: 75
End: 2020-12-15
Attending: SURGERY
Payer: COMMERCIAL

## 2020-12-15 DIAGNOSIS — I70.249: ICD-10-CM

## 2020-12-15 PROCEDURE — 93922 UPR/L XTREMITY ART 2 LEVELS: CPT | Performed by: RADIOLOGY

## 2020-12-16 ENCOUNTER — OFFICE VISIT (OUTPATIENT)
Dept: PODIATRY | Facility: CLINIC | Age: 75
End: 2020-12-16
Payer: COMMERCIAL

## 2020-12-16 VITALS — DIASTOLIC BLOOD PRESSURE: 52 MMHG | OXYGEN SATURATION: 100 % | SYSTOLIC BLOOD PRESSURE: 153 MMHG | HEART RATE: 49 BPM

## 2020-12-16 DIAGNOSIS — I87.8 VENOUS STASIS: ICD-10-CM

## 2020-12-16 DIAGNOSIS — E11.51 DIABETES MELLITUS WITH PERIPHERAL VASCULAR DISEASE (H): ICD-10-CM

## 2020-12-16 DIAGNOSIS — E11.49 TYPE II OR UNSPECIFIED TYPE DIABETES MELLITUS WITH NEUROLOGICAL MANIFESTATIONS, NOT STATED AS UNCONTROLLED(250.60) (H): Primary | ICD-10-CM

## 2020-12-16 DIAGNOSIS — L97.322 SKIN ULCER OF LEFT ANKLE WITH FAT LAYER EXPOSED (H): ICD-10-CM

## 2020-12-16 PROCEDURE — 99213 OFFICE O/P EST LOW 20 MIN: CPT | Performed by: PODIATRIST
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
## 2020-12-16 NOTE — NURSING NOTE
Amos Walker's chief complaint for this visit includes:  Chief Complaint   Patient presents with     RECHECK     left ankle ulcer     PCP: Racheal Swift    Referring Provider:  No referring provider defined for this encounter.    BP (!) 153/52 (BP Location: Left arm, Patient Position: Sitting, Cuff Size: Adult Regular)   Pulse (!) 49   SpO2 100%   Data Unavailable     Do you need any medication refills at today's visit? No    Maya Vidal CMA

## 2020-12-17 ENCOUNTER — VIRTUAL VISIT (OUTPATIENT)
Dept: VASCULAR SURGERY | Facility: CLINIC | Age: 75
End: 2020-12-17
Payer: COMMERCIAL

## 2020-12-17 DIAGNOSIS — Z98.890 STATUS POST CORONARY ANGIOGRAM: ICD-10-CM

## 2020-12-17 DIAGNOSIS — E11.40 TYPE 2 DIABETES, CONTROLLED, WITH NEUROPATHY (H): ICD-10-CM

## 2020-12-17 DIAGNOSIS — I70.243 ATHEROSCLEROSIS OF NATIVE ARTERY OF LEFT LOWER EXTREMITY WITH ULCERATION OF ANKLE (H): Primary | ICD-10-CM

## 2020-12-17 DIAGNOSIS — I10 ESSENTIAL HYPERTENSION: ICD-10-CM

## 2020-12-17 DIAGNOSIS — Z01.818 ENCOUNTER FOR OTHER PREPROCEDURAL EXAMINATION: ICD-10-CM

## 2020-12-17 DIAGNOSIS — E11.610 CHARCOT FOOT DUE TO DIABETES MELLITUS (H): ICD-10-CM

## 2020-12-17 DIAGNOSIS — N18.30 STAGE 3 CHRONIC KIDNEY DISEASE, UNSPECIFIED WHETHER STAGE 3A OR 3B CKD (H): ICD-10-CM

## 2020-12-17 PROCEDURE — 99446 NTRPROF PH1/NTRNET/EHR 5-10: CPT | Performed by: SURGERY

## 2020-12-17 NOTE — LETTER
"12/17/2020      RE: Amos Walker  [address] [place] [address]       Amos Walker is a 75 year old adult who is being evaluated via a billable telephone visit.      The patient has been notified of following:     \"This telephone visit will be conducted via a call between you and your physician/provider. We have found that certain health care needs can be provided without the need for a physical exam.  This service lets us provide the care you need with a short phone conversation.  If a prescription is necessary we can send it directly to your pharmacy.  If lab work is needed we can place an order for that and you can then stop by our lab to have the test done at a later time.    Telephone visits are billed at different rates depending on your insurance coverage. During this emergency period, for some insurers they may be billed the same as an in-person visit.  Please reach out to your insurance provider with any questions.    If during the course of the call the physician/provider feels a telephone visit is not appropriate, you will not be charged for this service.\"    Patient has given verbal consent for Telephone visit?  Yes    What phone number would you like to be contacted at? [phone]    How would you like to obtain your AVS? Mail a copy      This is a return visit by telephone for this 76 YO male with a past medical history significant for CAD, DM2, HTN, CKD Stage 3, and tobacco abuse who returns for evaluation of nonhealing ulcers in the left ankle. Most recently, the patient underwent a right femoral endarterectomy and PTA/stent of the right SFA to treat nonhealing right foot ulcers. These have healed completely and have not recurred. The patient notes that he has developed a Charcot foot on the right and has sustained a new ulceration on the medial malleolus due to pressure from the walking boot. The ulcer has been followed by Dr. Mcclain of podiatry with little improvement despite " excellent wound care. The patient also notes that he has developed swelling in the toes of the right foot.    I reviewed the results of the noninvasive studies from 12/15. These show a right CAROL of 0.62 (previous 0.69) and a toe pressure of 53. The left CAROL is 0.46 (previous 0.57) and the toes pressure is 28    Assess: critical ischemia of the left foot with nonhealing ankle ulcer    Plan: CTA abdomen, pelvis, runoff and vein mapping. RTC on 12/30 for face-to-face visit and operative planning.    I spent a total of 10 minutes with this patient, > 50% of which involved counseling.    Phone call duration: 10 minutes    Augusto Maharaj MD

## 2020-12-17 NOTE — LETTER
"12/17/2020       RE: Amos Walker  [address] [place] [address]     Dear Colleague,    Thank you for referring your patient, Amos Walker, to the [facility] [place] [facility]. Please see a copy of my visit note below.    Amos Walker is a 75 year old adult who is being evaluated via a billable telephone visit.      The patient has been notified of following:     \"This telephone visit will be conducted via a call between you and your physician/provider. We have found that certain health care needs can be provided without the need for a physical exam.  This service lets us provide the care you need with a short phone conversation.  If a prescription is necessary we can send it directly to your pharmacy.  If lab work is needed we can place an order for that and you can then stop by our lab to have the test done at a later time.    Telephone visits are billed at different rates depending on your insurance coverage. During this emergency period, for some insurers they may be billed the same as an in-person visit.  Please reach out to your insurance provider with any questions.    If during the course of the call the physician/provider feels a telephone visit is not appropriate, you will not be charged for this service.\"    Patient has given verbal consent for Telephone visit?  Yes    What phone number would you like to be contacted at? [phone]    How would you like to obtain your AVS? Mail a copy      This is a return visit by telephone for this 76 YO male with a past medical history significant for CAD, DM2, HTN, CKD Stage 3, and tobacco abuse who returns for evaluation of nonhealing ulcers in the left ankle. Most recently, the patient underwent a right femoral endarterectomy and PTA/stent of the right SFA to treat nonhealing right foot ulcers. These have healed completely and have not recurred. The patient notes that he has developed a " Charcot foot on the right and has sustained a new ulceration on the medial malleolus due to pressure from the walking boot. The ulcer has been followed by Dr. Mcclain of podiatry with little improvement despite excellent wound care. The patient also notes that he has developed swelling in the toes of the right foot.    I reviewed the results of the noninvasive studies from 12/15. These show a right CAROL of 0.62 (previous 0.69) and a toe pressure of 53. The left CAROL is 0.46 (previous 0.57) and the toes pressure is 28    Assess: critical ischemia of the left foot with nonhealing ankle ulcer    Plan: CTA abdomen, pelvis, runoff and vein mapping. RTC on 12/30 for face-to-face visit and operative planning.    I spent a total of 10 minutes with this patient, > 50% of which involved counseling.    Phone call duration: 10 minutes    Augusto Maharaj MD

## 2020-12-17 NOTE — PROGRESS NOTES
"Amos Walker is a 75 year old adult who is being evaluated via a billable telephone visit.      The patient has been notified of following:     \"This telephone visit will be conducted via a call between you and your physician/provider. We have found that certain health care needs can be provided without the need for a physical exam.  This service lets us provide the care you need with a short phone conversation.  If a prescription is necessary we can send it directly to your pharmacy.  If lab work is needed we can place an order for that and you can then stop by our lab to have the test done at a later time.    Telephone visits are billed at different rates depending on your insurance coverage. During this emergency period, for some insurers they may be billed the same as an in-person visit.  Please reach out to your insurance provider with any questions.    If during the course of the call the physician/provider feels a telephone visit is not appropriate, you will not be charged for this service.\"    Patient has given verbal consent for Telephone visit?  Yes    What phone number would you like to be contacted at? [phone]    How would you like to obtain your AVS? Mail a copy      This is a return visit by telephone for this 74 YO male with a past medical history significant for CAD, DM2, HTN, CKD Stage 3, and tobacco abuse who returns for evaluation of nonhealing ulcers in the left ankle. Most recently, the patient underwent a right femoral endarterectomy and PTA/stent of the right SFA to treat nonhealing right foot ulcers. These have healed completely and have not recurred. The patient notes that he has developed a Charcot foot on the right and has sustained a new ulceration on the medial malleolus due to pressure from the walking boot. The ulcer has been followed by Dr. Mcclain of podiatry with little improvement despite excellent wound care. The patient also notes that he has developed swelling in the " toes of the right foot.    I reviewed the results of the noninvasive studies from 12/15. These show a right CAROL of 0.62 (previous 0.69) and a toe pressure of 53. The left CAROL is 0.46 (previous 0.57) and the toes pressure is 28    Assess: critical ischemia of the left foot with nonhealing ankle ulcer    Plan: CTA abdomen, pelvis, runoff and vein mapping. RTC on 12/30 for face-to-face visit and operative planning.    I spent a total of 10 minutes with this patient, > 50% of which involved counseling.    Phone call duration: 10 minutes    Augusto Maharaj MD

## 2020-12-18 ENCOUNTER — TELEPHONE (OUTPATIENT)
Dept: VASCULAR SURGERY | Facility: CLINIC | Age: 75
End: 2020-12-18

## 2020-12-22 ENCOUNTER — OFFICE VISIT (OUTPATIENT)
Dept: PODIATRY | Facility: CLINIC | Age: 75
End: 2020-12-22
Payer: COMMERCIAL

## 2020-12-22 VITALS — SYSTOLIC BLOOD PRESSURE: 144 MMHG | DIASTOLIC BLOOD PRESSURE: 70 MMHG | HEART RATE: 90 BPM | OXYGEN SATURATION: 100 %

## 2020-12-22 DIAGNOSIS — L97.322 SKIN ULCER OF LEFT ANKLE WITH FAT LAYER EXPOSED (H): ICD-10-CM

## 2020-12-22 DIAGNOSIS — E11.49 TYPE II OR UNSPECIFIED TYPE DIABETES MELLITUS WITH NEUROLOGICAL MANIFESTATIONS, NOT STATED AS UNCONTROLLED(250.60) (H): Primary | ICD-10-CM

## 2020-12-22 DIAGNOSIS — I87.8 VENOUS STASIS: ICD-10-CM

## 2020-12-22 DIAGNOSIS — E11.610 CHARCOT FOOT DUE TO DIABETES MELLITUS (H): ICD-10-CM

## 2020-12-22 DIAGNOSIS — E11.51 DIABETES MELLITUS WITH PERIPHERAL VASCULAR DISEASE (H): ICD-10-CM

## 2020-12-22 PROCEDURE — 99213 OFFICE O/P EST LOW 20 MIN: CPT | Performed by: PODIATRIST

## 2020-12-22 NOTE — PATIENT INSTRUCTIONS
Thanks for coming today.  [facility]  [address] [place] [address]    To schedule future appointments in Ortho Clinic, you may call [phone].    To schedule ordered imaging by your provider:   Call [facility] [phone]    To schedule an injection ordered by your provider:  Call [facility] Injection scheduling line: [phone]  [employer] available online at:  [email or web address]    Please call if any further questions or concerns ([phone]).  Clinic hours 8 am to 5 pm.    Return to clinic (call) if symptoms worsen or fail to improve.     Detail Level: Zone Detail Level: Generalized Detail Level: Detailed
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
## 2020-12-22 NOTE — NURSING NOTE
Amos Walker's chief complaint for this visit includes:  Chief Complaint   Patient presents with     RECHECK     left ankle ulcer     PCP: Racheal Swift    Referring Provider:  No referring provider defined for this encounter.    BP (!) 144/70 (BP Location: Right arm, Patient Position: Sitting, Cuff Size: Adult Regular)   Pulse 90   SpO2 100%   Data Unavailable     Do you need any medication refills at today's visit? Yesenia Vidal CMA

## 2020-12-30 ENCOUNTER — ANCILLARY PROCEDURE (OUTPATIENT)
Dept: ULTRASOUND IMAGING | Facility: CLINIC | Age: 75
End: 2020-12-30
Attending: SURGERY
Payer: COMMERCIAL

## 2020-12-30 ENCOUNTER — ANCILLARY PROCEDURE (OUTPATIENT)
Dept: CT IMAGING | Facility: CLINIC | Age: 75
End: 2020-12-30
Attending: SURGERY
Payer: COMMERCIAL

## 2020-12-30 ENCOUNTER — OFFICE VISIT (OUTPATIENT)
Dept: VASCULAR SURGERY | Facility: CLINIC | Age: 75
End: 2020-12-30
Payer: COMMERCIAL

## 2020-12-30 VITALS — SYSTOLIC BLOOD PRESSURE: 138 MMHG | HEART RATE: 91 BPM | DIASTOLIC BLOOD PRESSURE: 61 MMHG | OXYGEN SATURATION: 98 %

## 2020-12-30 DIAGNOSIS — Z01.818 ENCOUNTER FOR OTHER PREPROCEDURAL EXAMINATION: ICD-10-CM

## 2020-12-30 DIAGNOSIS — I10 ESSENTIAL HYPERTENSION: ICD-10-CM

## 2020-12-30 DIAGNOSIS — I70.243 ATHEROSCLEROSIS OF NATIVE ARTERY OF LEFT LOWER EXTREMITY WITH ULCERATION OF ANKLE (H): ICD-10-CM

## 2020-12-30 DIAGNOSIS — I70.243 ATHEROSCLEROSIS OF NATIVE ARTERY OF LEFT LOWER EXTREMITY WITH ULCERATION OF ANKLE (H): Primary | ICD-10-CM

## 2020-12-30 DIAGNOSIS — E11.40 TYPE 2 DIABETES, CONTROLLED, WITH NEUROPATHY (H): ICD-10-CM

## 2020-12-30 DIAGNOSIS — N18.30 STAGE 3 CHRONIC KIDNEY DISEASE, UNSPECIFIED WHETHER STAGE 3A OR 3B CKD (H): ICD-10-CM

## 2020-12-30 DIAGNOSIS — E11.610 CHARCOT FOOT DUE TO DIABETES MELLITUS (H): ICD-10-CM

## 2020-12-30 LAB
CREAT BLD-MCNC: 1.3 MG/DL (ref 0.66–1.25)
GFR SERPL CREATININE-BSD FRML MDRD: 54 ML/MIN/{1.73_M2}

## 2020-12-30 PROCEDURE — 36415 COLL VENOUS BLD VENIPUNCTURE: CPT | Performed by: PATHOLOGY

## 2020-12-30 PROCEDURE — 93970 EXTREMITY STUDY: CPT | Mod: GC | Performed by: RADIOLOGY

## 2020-12-30 PROCEDURE — 82565 ASSAY OF CREATININE: CPT | Performed by: PATHOLOGY

## 2020-12-30 PROCEDURE — 99214 OFFICE O/P EST MOD 30 MIN: CPT | Mod: GC | Performed by: SURGERY

## 2020-12-30 PROCEDURE — 75635 CT ANGIO ABDOMINAL ARTERIES: CPT | Mod: GC | Performed by: RADIOLOGY

## 2020-12-30 RX ORDER — IOPAMIDOL 755 MG/ML
150 INJECTION, SOLUTION INTRAVASCULAR ONCE
Status: COMPLETED | OUTPATIENT
Start: 2020-12-30 | End: 2020-12-30

## 2020-12-30 RX ADMIN — IOPAMIDOL 150 ML: 755 INJECTION, SOLUTION INTRAVASCULAR at 08:54

## 2020-12-30 ASSESSMENT — PAIN SCALES - GENERAL: PAINLEVEL: NO PAIN (0)

## 2020-12-30 NOTE — PATIENT INSTRUCTIONS
Please do not eat 8 hours prior to your surgery, you may have clear liquids until the check in time. You should have a physical done by your PCP, Dr. Swift, prior to surgery. You will also be contacted by the COVID testing team to coordinate a COVID test 48-72 hours prior to surgery. Please shower the night before with a non scented anti bacterial soap to reduce your risk of surgical site infection. Let us know if you are not feeling well the week of surgery as this would impact our decision to put you under general anesthesia.     Please stop taking your Plavix 5 days before surgery.    We are planning on your surgery being on Tuesday, 1/12 in the morning. I will et you know the official time once I know.    Contact with questions or concerns in the meantime.     Yen Patterson

## 2020-12-30 NOTE — NURSING NOTE
[facility] Rooming Note     Amos Walker's goals for this visit include:   Chief Complaint   Patient presents with     RECHQUINTON Trinidad, is being seen today for a follow up LLE foot ulcer, condition has been slightly better than before, as reported by patient.     Christiana Hill LPN
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
## 2020-12-31 ENCOUNTER — MYC MEDICAL ADVICE (OUTPATIENT)
Dept: PODIATRY | Facility: CLINIC | Age: 75
End: 2020-12-31

## 2020-12-31 DIAGNOSIS — Z11.59 ENCOUNTER FOR SCREENING FOR OTHER VIRAL DISEASES: Primary | ICD-10-CM

## 2020-12-31 DIAGNOSIS — I95.9 HYPOTENSION, UNSPECIFIED HYPOTENSION TYPE: ICD-10-CM

## 2020-12-31 PROBLEM — I73.9 PAD (PERIPHERAL ARTERY DISEASE) (H): Status: ACTIVE | Noted: 2020-12-31

## 2020-12-31 NOTE — TELEPHONE ENCOUNTER
Spoke with patient.  He would like to see when Dr. Mcclain would like to see him after vascular surgery.  Will consult with Dr. Mcclain upon his return to the clinic and call patient to reschedule.

## 2021-01-04 NOTE — TELEPHONE ENCOUNTER
1/4 Called and spoke to patient. He is currently scheduled for follow up after procedure.     Cindy Caal   Procedure    Ortho/Sports Med/Pod/Ent/Eye  [facility]   [phone]

## 2021-01-05 ENCOUNTER — OFFICE VISIT (OUTPATIENT)
Dept: INTERNAL MEDICINE | Facility: CLINIC | Age: 76
End: 2021-01-05
Payer: COMMERCIAL

## 2021-01-05 VITALS
BODY MASS INDEX: 21.15 KG/M2 | DIASTOLIC BLOOD PRESSURE: 75 MMHG | HEART RATE: 82 BPM | OXYGEN SATURATION: 100 % | WEIGHT: 167 LBS | SYSTOLIC BLOOD PRESSURE: 146 MMHG

## 2021-01-05 DIAGNOSIS — Z01.818 PREOP GENERAL PHYSICAL EXAM: Primary | ICD-10-CM

## 2021-01-05 LAB — INTERPRETATION ECG - MUSE: NORMAL

## 2021-01-05 PROCEDURE — 99215 OFFICE O/P EST HI 40 MIN: CPT | Mod: 25 | Performed by: PATHOLOGY

## 2021-01-05 PROCEDURE — 93005 ELECTROCARDIOGRAM TRACING: CPT | Performed by: PATHOLOGY

## 2021-01-05 ASSESSMENT — PAIN SCALES - GENERAL: PAINLEVEL: NO PAIN (0)

## 2021-01-05 NOTE — LETTER
1/5/2021      RE: Amos Walker  [address] [facility] [place] [address]       CC: Amos Walker presents for pre-operative evaluation as requested by Dr. Maharaj prior to a scheduled left femoral endarterectomy and possible left external iliac PTA/stent    Date of Surgery/ Procedure: 1/12/21  Type of Anesthesia Anticipated: General    HPI:                                                      Amos Walker is a 76 YO male with a past medical history significant for CAD (2 vessel involving the left main s/p stenting in 2018, on plavix), DM2, HTN, CKD Stage 3, and tobacco abuse who presents for pre-operative appointment.      Patient has developed a nonhealing ulcer on the left malleolus due to local trauma from a walking boot. He had a right Charcot foot requiring a special boot for bracing; the boot has rubbed on his left medial ankle (he did not notice for a while due to LE neuropathy) and this has resulted in 1 cm medial ankle wound. It has been present for several months. No significant surrounding erythema or drainage. He is a current smoker of 10 cigarettes a day. T2DM on insulin. His most recent HgA1c was 5.8. He has a history of CAD with 2-vessel disease s/p stenting in 2018, on DAPT.    On vascular evaluation, left CAROL was decreased to 0.46 and the toe pressure is 28. CTA confirmed multilevel occlusive disease involving the fem-pop-tibial vessels. Plan per Vascular Surgery for left femoral endarterectomy and possible left external iliac PTA/stent as a first stage to increase inflow to the left lower extremity to provide adequate perfusion for ulcer healing.     He had a previous right femoral endarterectomy and PTA/stent of the fem-pop segment to treat nonhealing ulcers on the right foot. These have healed and not recurred.    Chest pain: no  SOB: no  Physical activity: limited to ambulation around home, uses cane  Hx of heart disease: yes  Hx of lung disease: prior eval by Pulm in JUly 2020: favor  nonobstructive emphysema; PFTs normal  Use of blood thinners: DAPT with ASA and plavix   Hx of adverse reaction to anesthesia: denies    MEDICAL HISTORY:                                                      Patient Active Problem List    Diagnosis Date Noted     PAD (peripheral artery disease) (H) 12/31/2020     Priority: Medium     Added automatically from request for surgery [id number]       Centrilobular emphysema (H) 07/06/2020     Priority: Medium     Adjustment disorder with depressed mood 05/06/2020     Priority: Medium     Colitis presumed infectious 10/31/2019     Priority: Medium     Hypotension, unspecified hypotension type 10/31/2019     Priority: Medium     Bright red blood per rectum 10/31/2019     Priority: Medium     Type II or unspecified type diabetes mellitus with neurological manifestations, not stated as uncontrolled(250.60) (H) 10/25/2019     Priority: Medium     Charcot foot due to diabetes mellitus (H) 10/25/2019     Priority: Medium     Venous stasis 10/25/2019     Priority: Medium     Ulcer of right lower extremity, limited to breakdown of skin (H) 10/25/2019     Priority: Medium     Atherosclerosis of native artery of left lower extremity with ulceration of ankle (H) 05/09/2019     Priority: Medium     Atherosclerosis of native arteries of right leg with ulceration of other part of foot (H) 05/09/2019     Priority: Medium     Non-healing ulcer (H) 09/15/2018     Priority: Medium     Critical lower limb ischemia 09/14/2018     Priority: Medium     Status post coronary angiogram 03/08/2018     Priority: Medium     Status post left heart catheterization 03/01/2018     Priority: Medium     Aftercare following joint replacement [Z47.1] 09/20/2017     Priority: Medium     Long-term (current) use of anticoagulants [Z79.01] 09/20/2017     Priority: Medium     Fracture of neck of femur (H) 08/25/2017     Priority: Medium     Diabetes mellitus with peripheral vascular disease (H) 02/21/2017      Priority: Medium     Type 2 diabetes, controlled, with neuropathy (H) 03/21/2016     Priority: Medium     CKD (chronic kidney disease) stage 3, GFR 30-59 ml/min 12/21/2015     Priority: Medium     HTN (hypertension) 06/26/2015     Priority: Medium     PVD (peripheral vascular disease) (H) 06/18/2015     Priority: Medium     Senile nuclear sclerosis 12/11/2014     Priority: Medium     Medications and allergies reviewed and updated.  Family and social history reviewed and updated    REVIEW OF SYSTEMS:                                                    Pertinent items are noted in HPI.  All other systems are negative.    EXAM:                                                    BP (!) 146/75 (BP Location: Left arm, Patient Position: Sitting, Cuff Size: Adult Large)   Pulse 82   Wt 75.8 kg (167 lb)   SpO2 100%   BMI 21.15 kg/m       GENERAL APPEARANCE: thin white male, well-groomed, alert and no distress  HEENT: EOMI, PERRL, no icterus, no cyanosis, JVP not elevated  RESPIRATORY: lungs clear to auscultation, respirations are unlabored, normal respiratory rate, no wheezing  CARDIOVASCULAR: regular rhythm, normal S1, S2, no murmur, click or rub, precordium quiet with normal PMI.  GI: soft, non tender  EXTREMITIES: no edema  NEURO: alert, normal speech,and affect; decreased LE sensation to touch   SKIN/ VASC: wound over left medial ankle 4x4 cm centrally ulcerated with surrounding erythema. R anterior lower leg with well-healed scar. Scattered skin rash on arms and legs consistent with eczema.     DIAGNOSTICS:                                                    EKG: normal sinus rhythm, VR 78 bpm  No labs drawn this encounter; had recent labs as below     Ref. Range 12/30/2020 08:50   Creatinine Ref Range: 0.66 - 1.25 mg/dL 1.3 (H)   GFR Estimate Ref Range: >60 54 (L)      Ref. Range 12/1/2020 10:42   Sodium Latest Ref Range: 133 - 144 mmol/L 138   Potassium Latest Ref Range: 3.4 - 5.3 mmol/L 4.1   Chloride Latest Ref  Range: 94 - 109 mmol/L 106   Carbon Dioxide Latest Ref Range: 20 - 32 mmol/L 30   Urea Nitrogen Latest Ref Range: 7 - 30 mg/dL 39 (H)   Creatinine Latest Ref Range: 0.66 - 1.25 mg/dL 1.22   GFR Estimate Latest Ref Range: >60 mL/min/1.73_m2 58 (L)   GFR Estimate If Black Latest Ref Range: >60 mL/min/1.73_m2 67   Calcium Latest Ref Range: 8.5 - 10.1 mg/dL 8.6   Anion Gap Latest Ref Range: 3 - 14 mmol/L 2 (L)   Albumin Latest Ref Range: 3.4 - 5.0 g/dL 3.7   Protein Total Latest Ref Range: 6.8 - 8.8 g/dL 7.5   Bilirubin Total Latest Ref Range: 0.2 - 1.3 mg/dL 0.5   Alkaline Phosphatase Latest Ref Range: 40 - 150 U/L 133   ALT Ref Range: 0 - 70  15   AST Ref Range: 0 - 45  19      Ref. Range 12/1/2020 10:42   WBC Ref Range: 4.0 - 11.0 10e9/L 5.9   Hemoglobin Ref Range: 13.3 - 17.7 g/dL 11.9 (L)   Hematocrit Ref Range: 40.0 - 53.0 % 36.5 (L)   Platelet Count Ref Range: 150 - 450 10e9/L 157   RBC Count Ref Range: 4.4 - 5.9 10e12/L 3.75 (L)   MCV Ref Range: 78 - 100 fl 97   MCH Ref Range: 26.5 - 33.0 pg 31.7     IMPRESSION:                                                    Amos Walker is here for a pre-operative evaluation prior to planned left femoral endarterectomy and possible left external iliac PTA/stent    Date of Surgery/ Procedure: 1/12/21    Cardiac Risk:  -Using the NSQIP surgical risk calculator, Amos Walker has a 12% risk of a major adverse cardiac event and 12% risk of any major complication with this surgery.      - MACE is 2 points (class III risk - moderate) which corresponds to a 10.1% risk of death, MI, or cardiac arrest within 30 days. Difficult to assess physical activity tolerance due to LE injury but Amos reports good physical activity tolerance. It is reasonable to proceed with the surgery without further diagnostic testing based on the 2014 ACC/AHA guidelines.     Pulmonary Risk:  Amos Walker is a current smoker of 10 cigarettes per day. He was advised that he needs to stop smoking now -  at least quitting until time of the surgery. He verbalizes understanding. He declines nicotine replacement products.     RECOMMENDATIONS:                                                    -Approval given to proceed with proposed procedure, without further diagnostic evaluation. Patient is overall at MODERATE risk due to history of current smoking and history of heart disease (CAD s/p stent for 2v disease). He had similar procedure for RLE wound in September 2018 and tolerated this well.     Pre-operative instructions:   - Stop taking plavix 5 days prior to surgery, as instructed by the surgical team.   - Ok to continue taking aspirin  - Do not take lisinopril the day of surgery  - No eating 8 hours prior to procedure   - No insulin on the morning of surgery  - Smoking cessation counseling provided     Copy of this evaluation report is provided to requesting physician.    [facility] Preop Guidelines      Attending Addendum:  Patient seen and examined with resident in clinic today.  Pertinent portions of history and exam were independently verified by myself.  I agree with the exam and plan as outlined above with the following modifications: none.  Racheal Swift MD  Internal Medicine          Alice Guajardo MD

## 2021-01-05 NOTE — NURSING NOTE
Chief Complaint   Patient presents with     Pre-Op Exam     Pt. here for pre-op       MORE Smith at 1:14 PM on 1/5/2021.

## 2021-01-05 NOTE — LETTER
1/5/2021      RE: Amos Walker  [address] [place] [address]       CC: Amos Walker presents for pre-operative evaluation as requested by Dr. Maharaj prior to a scheduled left femoral endarterectomy and possible left external iliac PTA/stent    Date of Surgery/ Procedure: 1/12/21  Type of Anesthesia Anticipated: General    HPI:                                                      Amos Walker is a 74 YO male with a past medical history significant for CAD (2 vessel involving the left main s/p stenting in 2018, on plavix), DM2, HTN, CKD Stage 3, and tobacco abuse who presents for pre-operative appointment.      Patient has developed a nonhealing ulcer on the left malleolus due to local trauma from a walking boot. He had a right Charcot foot requiring a special boot for bracing; the boot has rubbed on his left medial ankle (he did not notice for a while due to LE neuropathy) and this has resulted in 1 cm medial ankle wound. It has been present for several months. No significant surrounding erythema or drainage. He is a current smoker of 10 cigarettes a day. T2DM on insulin. His most recent HgA1c was 5.8. He has a history of CAD with 2-vessel disease s/p stenting in 2018, on DAPT.    On vascular evaluation, left CAROL was decreased to 0.46 and the toe pressure is 28. CTA confirmed multilevel occlusive disease involving the fem-pop-tibial vessels. Plan per Vascular Surgery for left femoral endarterectomy and possible left external iliac PTA/stent as a first stage to increase inflow to the left lower extremity to provide adequate perfusion for ulcer healing.     He had a previous right femoral endarterectomy and PTA/stent of the fem-pop segment to treat nonhealing ulcers on the right foot. These have healed and not recurred.    Chest pain: no  SOB: no  Physical activity: limited to ambulation around home, uses cane  Hx of heart disease: yes  Hx of lung disease: prior eval by Pulm in JUly 2020: favor  nonobstructive emphysema; PFTs normal  Use of blood thinners: DAPT with ASA and plavix   Hx of adverse reaction to anesthesia: denies    MEDICAL HISTORY:                                                      Patient Active Problem List    Diagnosis Date Noted     PAD (peripheral artery disease) (H) 12/31/2020     Priority: Medium     Added automatically from request for surgery [id number]       Centrilobular emphysema (H) 07/06/2020     Priority: Medium     Adjustment disorder with depressed mood 05/06/2020     Priority: Medium     Colitis presumed infectious 10/31/2019     Priority: Medium     Hypotension, unspecified hypotension type 10/31/2019     Priority: Medium     Bright red blood per rectum 10/31/2019     Priority: Medium     Type II or unspecified type diabetes mellitus with neurological manifestations, not stated as uncontrolled(250.60) (H) 10/25/2019     Priority: Medium     Charcot foot due to diabetes mellitus (H) 10/25/2019     Priority: Medium     Venous stasis 10/25/2019     Priority: Medium     Ulcer of right lower extremity, limited to breakdown of skin (H) 10/25/2019     Priority: Medium     Atherosclerosis of native artery of left lower extremity with ulceration of ankle (H) 05/09/2019     Priority: Medium     Atherosclerosis of native arteries of right leg with ulceration of other part of foot (H) 05/09/2019     Priority: Medium     Non-healing ulcer (H) 09/15/2018     Priority: Medium     Critical lower limb ischemia 09/14/2018     Priority: Medium     Status post coronary angiogram 03/08/2018     Priority: Medium     Status post left heart catheterization 03/01/2018     Priority: Medium     Aftercare following joint replacement [Z47.1] 09/20/2017     Priority: Medium     Long-term (current) use of anticoagulants [Z79.01] 09/20/2017     Priority: Medium     Fracture of neck of femur (H) 08/25/2017     Priority: Medium     Diabetes mellitus with peripheral vascular disease (H) 02/21/2017      Priority: Medium     Type 2 diabetes, controlled, with neuropathy (H) 03/21/2016     Priority: Medium     CKD (chronic kidney disease) stage 3, GFR 30-59 ml/min 12/21/2015     Priority: Medium     HTN (hypertension) 06/26/2015     Priority: Medium     PVD (peripheral vascular disease) (H) 06/18/2015     Priority: Medium     Senile nuclear sclerosis 12/11/2014     Priority: Medium     Medications and allergies reviewed and updated.  Family and social history reviewed and updated    REVIEW OF SYSTEMS:                                                    Pertinent items are noted in HPI.  All other systems are negative.    EXAM:                                                    BP (!) 146/75 (BP Location: Left arm, Patient Position: Sitting, Cuff Size: Adult Large)   Pulse 82   Wt 75.8 kg (167 lb)   SpO2 100%   BMI 21.15 kg/m       GENERAL APPEARANCE: thin white male, well-groomed, alert and no distress  HEENT: EOMI, PERRL, no icterus, no cyanosis, JVP not elevated  RESPIRATORY: lungs clear to auscultation, respirations are unlabored, normal respiratory rate, no wheezing  CARDIOVASCULAR: regular rhythm, normal S1, S2, no murmur, click or rub, precordium quiet with normal PMI.  GI: soft, non tender  EXTREMITIES: no edema  NEURO: alert, normal speech,and affect; decreased LE sensation to touch   SKIN/ VASC: wound over left medial ankle 4x4 cm centrally ulcerated with surrounding erythema. R anterior lower leg with well-healed scar. Scattered skin rash on arms and legs consistent with eczema.     DIAGNOSTICS:                                                    EKG: normal sinus rhythm, VR 78 bpm  No labs drawn this encounter; had recent labs as below     Ref. Range 12/30/2020 08:50   Creatinine Ref Range: 0.66 - 1.25 mg/dL 1.3 (H)   GFR Estimate Ref Range: >60 54 (L)      Ref. Range 12/1/2020 10:42   Sodium Latest Ref Range: 133 - 144 mmol/L 138   Potassium Latest Ref Range: 3.4 - 5.3 mmol/L 4.1   Chloride Latest Ref  Range: 94 - 109 mmol/L 106   Carbon Dioxide Latest Ref Range: 20 - 32 mmol/L 30   Urea Nitrogen Latest Ref Range: 7 - 30 mg/dL 39 (H)   Creatinine Latest Ref Range: 0.66 - 1.25 mg/dL 1.22   GFR Estimate Latest Ref Range: >60 mL/min/1.73_m2 58 (L)   GFR Estimate If Black Latest Ref Range: >60 mL/min/1.73_m2 67   Calcium Latest Ref Range: 8.5 - 10.1 mg/dL 8.6   Anion Gap Latest Ref Range: 3 - 14 mmol/L 2 (L)   Albumin Latest Ref Range: 3.4 - 5.0 g/dL 3.7   Protein Total Latest Ref Range: 6.8 - 8.8 g/dL 7.5   Bilirubin Total Latest Ref Range: 0.2 - 1.3 mg/dL 0.5   Alkaline Phosphatase Latest Ref Range: 40 - 150 U/L 133   ALT Ref Range: 0 - 70  15   AST Ref Range: 0 - 45  19      Ref. Range 12/1/2020 10:42   WBC Ref Range: 4.0 - 11.0 10e9/L 5.9   Hemoglobin Ref Range: 13.3 - 17.7 g/dL 11.9 (L)   Hematocrit Ref Range: 40.0 - 53.0 % 36.5 (L)   Platelet Count Ref Range: 150 - 450 10e9/L 157   RBC Count Ref Range: 4.4 - 5.9 10e12/L 3.75 (L)   MCV Ref Range: 78 - 100 fl 97   MCH Ref Range: 26.5 - 33.0 pg 31.7     IMPRESSION:                                                    Amos Walker is here for a pre-operative evaluation prior to planned left femoral endarterectomy and possible left external iliac PTA/stent    Date of Surgery/ Procedure: 1/12/21    Cardiac Risk:  -Using the NSQIP surgical risk calculator, Amos Walker has a 12% risk of a major adverse cardiac event and 12% risk of any major complication with this surgery.      - MACE is 2 points (class III risk - moderate) which corresponds to a 10.1% risk of death, MI, or cardiac arrest within 30 days. Difficult to assess physical activity tolerance due to LE injury but Amos reports good physical activity tolerance. It is reasonable to proceed with the surgery without further diagnostic testing based on the 2014 ACC/AHA guidelines.     Pulmonary Risk:  Amos Walker is a current smoker of 10 cigarettes per day. He was advised that he needs to stop smoking now -  at least quitting until time of the surgery. He verbalizes understanding. He declines nicotine replacement products.     RECOMMENDATIONS:                                                    -Approval given to proceed with proposed procedure, without further diagnostic evaluation. Patient is overall at MODERATE risk due to history of current smoking and history of heart disease (CAD s/p stent for 2v disease). He had similar procedure for RLE wound in September 2018 and tolerated this well.     Pre-operative instructions:   - Stop taking plavix 5 days prior to surgery, as instructed by the surgical team.   - Ok to continue taking aspirin  - Do not take lisinopril the day of surgery  - No eating 8 hours prior to procedure   - No insulin on the morning of surgery  - Smoking cessation counseling provided     Copy of this evaluation report is provided to requesting physician.    [facility] Preop Guidelines      Attending Addendum:  Patient seen and examined with resident in clinic today.  Pertinent portions of history and exam were independently verified by myself.  I agree with the exam and plan as outlined above with the following modifications: none.  Racheal Swift MD  Internal Medicine          Alice Guajardo MD

## 2021-01-05 NOTE — PATIENT INSTRUCTIONS
Dear Mr. Walker,     You were seen today for pre-operative appointment.     Pre-operative instructions:   - Stop taking plavix 5 days prior to surgery, as instructed by the surgical team.   - Ok to continue taking aspirin  - Do not take lisinopril the day of surgery  - No eating and no insulin on the day of surgery.   - Stop smoking. This is very important prior to surgery and anaesthesia.     Pre-operative assessment will be filed in your chart.     Take care,   Dr. Guajardo        [facility] Medication Refill Request Information:  * Please contact your pharmacy regarding ANY request for medication refills.  ** [facility] Prescription Fax = [phone]  * Please allow 3 business days for routine medication refills.  * Please allow 5 business days for controlled substance medication refills.     [facility] Test Result notification information:  *You will be notified with in 7-10 days of your appointment day regarding the results of your test.  If you are on MyChart you will be notified as soon as the provider has reviewed the results and signed off on them.    [facility]: [phone]

## 2021-01-05 NOTE — LETTER
1/5/2021       RE: Amos Walker  [address] [place] [address]     Dear Colleague,    Thank you for referring your patient, Amos Walker, to the [facility] [place] at [facility]. Please see a copy of my visit note below.    CC: Amos Walker presents for pre-operative evaluation as requested by Dr. Maharaj prior to a scheduled left femoral endarterectomy and possible left external iliac PTA/stent    Date of Surgery/ Procedure: 1/12/21  Type of Anesthesia Anticipated: General    HPI:                                                      Amos Walker is a 76 YO male with a past medical history significant for CAD (2 vessel involving the left main s/p stenting in 2018, on plavix), DM2, HTN, CKD Stage 3, and tobacco abuse who presents for pre-operative appointment.      Patient has developed a nonhealing ulcer on the left malleolus due to local trauma from a walking boot. He had a right Charcot foot requiring a special boot for bracing; the boot has rubbed on his left medial ankle (he did not notice for a while due to LE neuropathy) and this has resulted in 1 cm medial ankle wound. It has been present for several months. No significant surrounding erythema or drainage. He is a current smoker of 10 cigarettes a day. T2DM on insulin. His most recent HgA1c was 5.8. He has a history of CAD with 2-vessel disease s/p stenting in 2018, on DAPT.    On vascular evaluation, left CAROL was decreased to 0.46 and the toe pressure is 28. CTA confirmed multilevel occlusive disease involving the fem-pop-tibial vessels. Plan per Vascular Surgery for left femoral endarterectomy and possible left external iliac PTA/stent as a first stage to increase inflow to the left lower extremity to provide adequate perfusion for ulcer healing.     He had a previous right femoral endarterectomy and PTA/stent of the fem-pop segment to treat nonhealing ulcers on the  right foot. These have healed and not recurred.    Chest pain: no  SOB: no  Physical activity: limited to ambulation around home, uses cane  Hx of heart disease: yes  Hx of lung disease: prior eval by Pulm in JUly 2020: favor nonobstructive emphysema; PFTs normal  Use of blood thinners: DAPT with ASA and plavix   Hx of adverse reaction to anesthesia: denies    MEDICAL HISTORY:                                                      Patient Active Problem List    Diagnosis Date Noted     PAD (peripheral artery disease) (H) 12/31/2020     Priority: Medium     Added automatically from request for surgery [id number]       Centrilobular emphysema (H) 07/06/2020     Priority: Medium     Adjustment disorder with depressed mood 05/06/2020     Priority: Medium     Colitis presumed infectious 10/31/2019     Priority: Medium     Hypotension, unspecified hypotension type 10/31/2019     Priority: Medium     Bright red blood per rectum 10/31/2019     Priority: Medium     Type II or unspecified type diabetes mellitus with neurological manifestations, not stated as uncontrolled(250.60) (H) 10/25/2019     Priority: Medium     Charcot foot due to diabetes mellitus (H) 10/25/2019     Priority: Medium     Venous stasis 10/25/2019     Priority: Medium     Ulcer of right lower extremity, limited to breakdown of skin (H) 10/25/2019     Priority: Medium     Atherosclerosis of native artery of left lower extremity with ulceration of ankle (H) 05/09/2019     Priority: Medium     Atherosclerosis of native arteries of right leg with ulceration of other part of foot (H) 05/09/2019     Priority: Medium     Non-healing ulcer (H) 09/15/2018     Priority: Medium     Critical lower limb ischemia 09/14/2018     Priority: Medium     Status post coronary angiogram 03/08/2018     Priority: Medium     Status post left heart catheterization 03/01/2018     Priority: Medium     Aftercare following joint replacement [Z47.1] 09/20/2017     Priority: Medium      Long-term (current) use of anticoagulants [Z79.01] 09/20/2017     Priority: Medium     Fracture of neck of femur (H) 08/25/2017     Priority: Medium     Diabetes mellitus with peripheral vascular disease (H) 02/21/2017     Priority: Medium     Type 2 diabetes, controlled, with neuropathy (H) 03/21/2016     Priority: Medium     CKD (chronic kidney disease) stage 3, GFR 30-59 ml/min 12/21/2015     Priority: Medium     HTN (hypertension) 06/26/2015     Priority: Medium     PVD (peripheral vascular disease) (H) 06/18/2015     Priority: Medium     Senile nuclear sclerosis 12/11/2014     Priority: Medium     Medications and allergies reviewed and updated.  Family and social history reviewed and updated    REVIEW OF SYSTEMS:                                                    Pertinent items are noted in HPI.  All other systems are negative.    EXAM:                                                    BP (!) 146/75 (BP Location: Left arm, Patient Position: Sitting, Cuff Size: Adult Large)   Pulse 82   Wt 75.8 kg (167 lb)   SpO2 100%   BMI 21.15 kg/m       GENERAL APPEARANCE: thin white male, well-groomed, alert and no distress  HEENT: EOMI, PERRL, no icterus, no cyanosis, JVP not elevated  RESPIRATORY: lungs clear to auscultation, respirations are unlabored, normal respiratory rate, no wheezing  CARDIOVASCULAR: regular rhythm, normal S1, S2, no murmur, click or rub, precordium quiet with normal PMI.  GI: soft, non tender  EXTREMITIES: no edema  NEURO: alert, normal speech,and affect; decreased LE sensation to touch   SKIN/ VASC: wound over left medial ankle 4x4 cm centrally ulcerated with surrounding erythema. R anterior lower leg with well-healed scar. Scattered skin rash on arms and legs consistent with eczema.     DIAGNOSTICS:                                                    EKG: normal sinus rhythm, VR 78 bpm  No labs drawn this encounter; had recent labs as below     Ref. Range 12/30/2020 08:50   Creatinine Ref  Range: 0.66 - 1.25 mg/dL 1.3 (H)   GFR Estimate Ref Range: >60 54 (L)      Ref. Range 12/1/2020 10:42   Sodium Latest Ref Range: 133 - 144 mmol/L 138   Potassium Latest Ref Range: 3.4 - 5.3 mmol/L 4.1   Chloride Latest Ref Range: 94 - 109 mmol/L 106   Carbon Dioxide Latest Ref Range: 20 - 32 mmol/L 30   Urea Nitrogen Latest Ref Range: 7 - 30 mg/dL 39 (H)   Creatinine Latest Ref Range: 0.66 - 1.25 mg/dL 1.22   GFR Estimate Latest Ref Range: >60 mL/min/1.73_m2 58 (L)   GFR Estimate If Black Latest Ref Range: >60 mL/min/1.73_m2 67   Calcium Latest Ref Range: 8.5 - 10.1 mg/dL 8.6   Anion Gap Latest Ref Range: 3 - 14 mmol/L 2 (L)   Albumin Latest Ref Range: 3.4 - 5.0 g/dL 3.7   Protein Total Latest Ref Range: 6.8 - 8.8 g/dL 7.5   Bilirubin Total Latest Ref Range: 0.2 - 1.3 mg/dL 0.5   Alkaline Phosphatase Latest Ref Range: 40 - 150 U/L 133   ALT Ref Range: 0 - 70  15   AST Ref Range: 0 - 45  19      Ref. Range 12/1/2020 10:42   WBC Ref Range: 4.0 - 11.0 10e9/L 5.9   Hemoglobin Ref Range: 13.3 - 17.7 g/dL 11.9 (L)   Hematocrit Ref Range: 40.0 - 53.0 % 36.5 (L)   Platelet Count Ref Range: 150 - 450 10e9/L 157   RBC Count Ref Range: 4.4 - 5.9 10e12/L 3.75 (L)   MCV Ref Range: 78 - 100 fl 97   MCH Ref Range: 26.5 - 33.0 pg 31.7     IMPRESSION:                                                    Amos Walker is here for a pre-operative evaluation prior to planned left femoral endarterectomy and possible left external iliac PTA/stent    Date of Surgery/ Procedure: 1/12/21    Cardiac Risk:  -Using the NSQIP surgical risk calculator, Amos Walker has a 12% risk of a major adverse cardiac event and 12% risk of any major complication with this surgery.      - MACE is 2 points (class III risk - moderate) which corresponds to a 10.1% risk of death, MI, or cardiac arrest within 30 days. Difficult to assess physical activity tolerance due to LE injury but Amos reports good physical activity tolerance. It is reasonable to proceed  with the surgery without further diagnostic testing based on the 2014 ACC/AHA guidelines.     Pulmonary Risk:  Amos Walker is a current smoker of 10 cigarettes per day. He was advised that he needs to stop smoking now - at least quitting until time of the surgery. He verbalizes understanding. He declines nicotine replacement products.     RECOMMENDATIONS:                                                    -Approval given to proceed with proposed procedure, without further diagnostic evaluation. Patient is overall at MODERATE risk due to history of current smoking and history of heart disease (CAD s/p stent for 2v disease). He had similar procedure for RLE wound in September 2018 and tolerated this well.     Pre-operative instructions:   - Stop taking plavix 5 days prior to surgery, as instructed by the surgical team.   - Ok to continue taking aspirin  - Do not take lisinopril the day of surgery  - No eating 8 hours prior to procedure   - No insulin on the morning of surgery  - Smoking cessation counseling provided     Copy of this evaluation report is provided to requesting physician.    [facility] Preop Guidelines      Attending Addendum:  Patient seen and examined with resident in clinic today.  Pertinent portions of history and exam were independently verified by myself.  I agree with the exam and plan as outlined above with the following modifications: none.  Racheal Swift MD  Internal Medicine          Again, thank you for allowing me to participate in the care of your patient.      Sincerely,    Alice Guajardo MD

## 2021-01-05 NOTE — PROGRESS NOTES
CC: Amos Walker presents for pre-operative evaluation as requested by Dr. Maharaj prior to a scheduled left femoral endarterectomy and possible left external iliac PTA/stent    Date of Surgery/ Procedure: 1/12/21  Type of Anesthesia Anticipated: General    HPI:                                                      Amos Walker is a 76 YO male with a past medical history significant for CAD (2 vessel involving the left main s/p stenting in 2018, on plavix), DM2, HTN, CKD Stage 3, and tobacco abuse who presents for pre-operative appointment.      Patient has developed a nonhealing ulcer on the left malleolus due to local trauma from a walking boot. He had a right Charcot foot requiring a special boot for bracing; the boot has rubbed on his left medial ankle (he did not notice for a while due to LE neuropathy) and this has resulted in 1 cm medial ankle wound. It has been present for several months. No significant surrounding erythema or drainage. He is a current smoker of 10 cigarettes a day. T2DM on insulin. His most recent HgA1c was 5.8. He has a history of CAD with 2-vessel disease s/p stenting in 2018, on DAPT.    On vascular evaluation, left CAROL was decreased to 0.46 and the toe pressure is 28. CTA confirmed multilevel occlusive disease involving the fem-pop-tibial vessels. Plan per Vascular Surgery for left femoral endarterectomy and possible left external iliac PTA/stent as a first stage to increase inflow to the left lower extremity to provide adequate perfusion for ulcer healing.     He had a previous right femoral endarterectomy and PTA/stent of the fem-pop segment to treat nonhealing ulcers on the right foot. These have healed and not recurred.    Chest pain: no  SOB: no  Physical activity: limited to ambulation around home, uses cane  Hx of heart disease: yes  Hx of lung disease: prior eval by Pulm in JUly 2020: favor nonobstructive emphysema; PFTs normal  Use of blood thinners: DAPT with ASA and  plavix   Hx of adverse reaction to anesthesia: denies    MEDICAL HISTORY:                                                      Patient Active Problem List    Diagnosis Date Noted     PAD (peripheral artery disease) (H) 12/31/2020     Priority: Medium     Added automatically from request for surgery [id number]       Centrilobular emphysema (H) 07/06/2020     Priority: Medium     Adjustment disorder with depressed mood 05/06/2020     Priority: Medium     Colitis presumed infectious 10/31/2019     Priority: Medium     Hypotension, unspecified hypotension type 10/31/2019     Priority: Medium     Bright red blood per rectum 10/31/2019     Priority: Medium     Type II or unspecified type diabetes mellitus with neurological manifestations, not stated as uncontrolled(250.60) (H) 10/25/2019     Priority: Medium     Charcot foot due to diabetes mellitus (H) 10/25/2019     Priority: Medium     Venous stasis 10/25/2019     Priority: Medium     Ulcer of right lower extremity, limited to breakdown of skin (H) 10/25/2019     Priority: Medium     Atherosclerosis of native artery of left lower extremity with ulceration of ankle (H) 05/09/2019     Priority: Medium     Atherosclerosis of native arteries of right leg with ulceration of other part of foot (H) 05/09/2019     Priority: Medium     Non-healing ulcer (H) 09/15/2018     Priority: Medium     Critical lower limb ischemia 09/14/2018     Priority: Medium     Status post coronary angiogram 03/08/2018     Priority: Medium     Status post left heart catheterization 03/01/2018     Priority: Medium     Aftercare following joint replacement [Z47.1] 09/20/2017     Priority: Medium     Long-term (current) use of anticoagulants [Z79.01] 09/20/2017     Priority: Medium     Fracture of neck of femur (H) 08/25/2017     Priority: Medium     Diabetes mellitus with peripheral vascular disease (H) 02/21/2017     Priority: Medium     Type 2 diabetes, controlled, with neuropathy (H) 03/21/2016      Priority: Medium     CKD (chronic kidney disease) stage 3, GFR 30-59 ml/min 12/21/2015     Priority: Medium     HTN (hypertension) 06/26/2015     Priority: Medium     PVD (peripheral vascular disease) (H) 06/18/2015     Priority: Medium     Senile nuclear sclerosis 12/11/2014     Priority: Medium     Medications and allergies reviewed and updated.  Family and social history reviewed and updated    REVIEW OF SYSTEMS:                                                    Pertinent items are noted in HPI.  All other systems are negative.    EXAM:                                                    BP (!) 146/75 (BP Location: Left arm, Patient Position: Sitting, Cuff Size: Adult Large)   Pulse 82   Wt 75.8 kg (167 lb)   SpO2 100%   BMI 21.15 kg/m       GENERAL APPEARANCE: thin white male, well-groomed, alert and no distress  HEENT: EOMI, PERRL, no icterus, no cyanosis, JVP not elevated  RESPIRATORY: lungs clear to auscultation, respirations are unlabored, normal respiratory rate, no wheezing  CARDIOVASCULAR: regular rhythm, normal S1, S2, no murmur, click or rub, precordium quiet with normal PMI.  GI: soft, non tender  EXTREMITIES: no edema  NEURO: alert, normal speech,and affect; decreased LE sensation to touch   SKIN/ VASC: wound over left medial ankle 4x4 cm centrally ulcerated with surrounding erythema. R anterior lower leg with well-healed scar. Scattered skin rash on arms and legs consistent with eczema.     DIAGNOSTICS:                                                    EKG: normal sinus rhythm, VR 78 bpm  No labs drawn this encounter; had recent labs as below     Ref. Range 12/30/2020 08:50   Creatinine Ref Range: 0.66 - 1.25 mg/dL 1.3 (H)   GFR Estimate Ref Range: >60 54 (L)      Ref. Range 12/1/2020 10:42   Sodium Latest Ref Range: 133 - 144 mmol/L 138   Potassium Latest Ref Range: 3.4 - 5.3 mmol/L 4.1   Chloride Latest Ref Range: 94 - 109 mmol/L 106   Carbon Dioxide Latest Ref Range: 20 - 32 mmol/L 30   Urea  Nitrogen Latest Ref Range: 7 - 30 mg/dL 39 (H)   Creatinine Latest Ref Range: 0.66 - 1.25 mg/dL 1.22   GFR Estimate Latest Ref Range: >60 mL/min/1.73_m2 58 (L)   GFR Estimate If Black Latest Ref Range: >60 mL/min/1.73_m2 67   Calcium Latest Ref Range: 8.5 - 10.1 mg/dL 8.6   Anion Gap Latest Ref Range: 3 - 14 mmol/L 2 (L)   Albumin Latest Ref Range: 3.4 - 5.0 g/dL 3.7   Protein Total Latest Ref Range: 6.8 - 8.8 g/dL 7.5   Bilirubin Total Latest Ref Range: 0.2 - 1.3 mg/dL 0.5   Alkaline Phosphatase Latest Ref Range: 40 - 150 U/L 133   ALT Ref Range: 0 - 70  15   AST Ref Range: 0 - 45  19      Ref. Range 12/1/2020 10:42   WBC Ref Range: 4.0 - 11.0 10e9/L 5.9   Hemoglobin Ref Range: 13.3 - 17.7 g/dL 11.9 (L)   Hematocrit Ref Range: 40.0 - 53.0 % 36.5 (L)   Platelet Count Ref Range: 150 - 450 10e9/L 157   RBC Count Ref Range: 4.4 - 5.9 10e12/L 3.75 (L)   MCV Ref Range: 78 - 100 fl 97   MCH Ref Range: 26.5 - 33.0 pg 31.7     IMPRESSION:                                                    Amos Walker is here for a pre-operative evaluation prior to planned left femoral endarterectomy and possible left external iliac PTA/stent    Date of Surgery/ Procedure: 1/12/21    Cardiac Risk:  -Using the NSQIP surgical risk calculator, Amos Walker has a 12% risk of a major adverse cardiac event and 12% risk of any major complication with this surgery.      - MACE is 2 points (class III risk - moderate) which corresponds to a 10.1% risk of death, MI, or cardiac arrest within 30 days. Difficult to assess physical activity tolerance due to LE injury but Amos reports good physical activity tolerance. It is reasonable to proceed with the surgery without further diagnostic testing based on the 2014 ACC/AHA guidelines.     Pulmonary Risk:  Aoms Walker is a current smoker of 10 cigarettes per day. He was advised that he needs to stop smoking now - at least quitting until time of the surgery. He verbalizes understanding. He declines  nicotine replacement products.     RECOMMENDATIONS:                                                    -Approval given to proceed with proposed procedure, without further diagnostic evaluation. Patient is overall at MODERATE risk due to history of current smoking and history of heart disease (CAD s/p stent for 2v disease). He had similar procedure for RLE wound in September 2018 and tolerated this well.     Pre-operative instructions:   - Stop taking plavix 5 days prior to surgery, as instructed by the surgical team.   - Ok to continue taking aspirin  - Do not take lisinopril the day of surgery  - No eating 8 hours prior to procedure   - No insulin on the morning of surgery  - Smoking cessation counseling provided     Copy of this evaluation report is provided to requesting physician.    [facility] Preop Guidelines      Attending Addendum:  Patient seen and examined with resident in clinic today.  Pertinent portions of history and exam were independently verified by myself.  I agree with the exam and plan as outlined above with the following modifications: none.  Racheal Swift MD  Internal Medicine

## 2021-01-05 NOTE — NURSING NOTE
Surgeon: Dr. Maharaj  Location of the surgery: [facility]  Date of the surgery: 1/12/21  Procedure: endarterectomy, femoral, left  History of reaction to anesthesia? : NO

## 2021-01-06 RX ORDER — LISINOPRIL 2.5 MG/1
2.5 TABLET ORAL DAILY
Qty: 90 TABLET | Refills: 0 | Status: SHIPPED | OUTPATIENT
Start: 2021-01-06 | End: 2021-02-05

## 2021-01-06 NOTE — TELEPHONE ENCOUNTER
lisinopril (ZESTRIL) 2.5 MG tablet     Last Written Prescription Date:  12/20/19  Last Fill Quantity: 90,   # refills: 1  Last Office Visit : 10/15/20  Future Office visit:  1/11/21    Routing refill request to provider for review/approval because:  Pt reports taking 5mg  Abnormal Cr  Cr on 12/30/20=1.3    Thank-you, Racheal SALCIDO RN Medication Refill Team

## 2021-01-08 DIAGNOSIS — Z11.59 ENCOUNTER FOR SCREENING FOR OTHER VIRAL DISEASES: ICD-10-CM

## 2021-01-08 LAB
SARS-COV-2 RNA RESP QL NAA+PROBE: NORMAL
SPECIMEN SOURCE: NORMAL

## 2021-01-08 PROCEDURE — U0005 INFEC AGEN DETEC AMPLI PROBE: HCPCS | Performed by: SURGERY

## 2021-01-08 PROCEDURE — U0003 INFECTIOUS AGENT DETECTION BY NUCLEIC ACID (DNA OR RNA); SEVERE ACUTE RESPIRATORY SYNDROME CORONAVIRUS 2 (SARS-COV-2) (CORONAVIRUS DISEASE [COVID-19]), AMPLIFIED PROBE TECHNIQUE, MAKING USE OF HIGH THROUGHPUT TECHNOLOGIES AS DESCRIBED BY CMS-2020-01-R: HCPCS | Performed by: SURGERY

## 2021-01-08 RX ORDER — CEFAZOLIN SODIUM 2 G/50ML
2 SOLUTION INTRAVENOUS
Status: CANCELLED | OUTPATIENT
Start: 2021-01-08

## 2021-01-08 RX ORDER — CEFAZOLIN SODIUM 1 G/50ML
1 INJECTION, SOLUTION INTRAVENOUS SEE ADMIN INSTRUCTIONS
Status: CANCELLED | OUTPATIENT
Start: 2021-01-08

## 2021-01-09 LAB
LABORATORY COMMENT REPORT: NORMAL
SARS-COV-2 RNA RESP QL NAA+PROBE: NEGATIVE
SPECIMEN SOURCE: NORMAL

## 2021-01-11 ENCOUNTER — ANESTHESIA EVENT (OUTPATIENT)
Dept: SURGERY | Facility: CLINIC | Age: 76
DRG: 254 | End: 2021-01-11
Payer: COMMERCIAL

## 2021-01-12 ENCOUNTER — ANESTHESIA (OUTPATIENT)
Dept: SURGERY | Facility: CLINIC | Age: 76
DRG: 254 | End: 2021-01-12
Payer: COMMERCIAL

## 2021-01-12 ENCOUNTER — APPOINTMENT (OUTPATIENT)
Dept: INTERVENTIONAL RADIOLOGY/VASCULAR | Facility: CLINIC | Age: 76
DRG: 254 | End: 2021-01-12
Attending: SURGERY
Payer: COMMERCIAL

## 2021-01-12 ENCOUNTER — HOSPITAL ENCOUNTER (INPATIENT)
Facility: CLINIC | Age: 76
LOS: 1 days | Discharge: HOME | DRG: 254 | End: 2021-01-13
Attending: SURGERY | Admitting: SURGERY
Payer: COMMERCIAL

## 2021-01-12 DIAGNOSIS — I73.9 PAD (PERIPHERAL ARTERY DISEASE) (H): ICD-10-CM

## 2021-01-12 DIAGNOSIS — L98.499 NON-HEALING ULCER (H): ICD-10-CM

## 2021-01-12 LAB
ABO + RH BLD: NORMAL
ABO + RH BLD: NORMAL
BLD GP AB SCN SERPL QL: NORMAL
BLOOD BANK CMNT PATIENT-IMP: NORMAL
GLUCOSE BLDC GLUCOMTR-MCNC: 119 MG/DL (ref 70–99)
GLUCOSE BLDC GLUCOMTR-MCNC: 128 MG/DL (ref 70–99)
GLUCOSE BLDC GLUCOMTR-MCNC: 131 MG/DL (ref 70–99)
GLUCOSE BLDC GLUCOMTR-MCNC: 149 MG/DL (ref 70–99)
GLUCOSE BLDC GLUCOMTR-MCNC: 184 MG/DL (ref 70–99)
GLUCOSE BLDC GLUCOMTR-MCNC: 306 MG/DL (ref 70–99)
HBA1C MFR BLD: 6 % (ref 0–5.6)
HGB BLD-MCNC: 11.8 G/DL (ref 13.3–17.7)
PLATELET # BLD AUTO: 133 10E9/L (ref 150–450)
POTASSIUM SERPL-SCNC: 4.4 MMOL/L (ref 3.4–5.3)
SPECIMEN EXP DATE BLD: NORMAL

## 2021-01-12 PROCEDURE — 250N000011 HC RX IP 250 OP 636: Performed by: NURSE PRACTITIONER

## 2021-01-12 PROCEDURE — 83036 HEMOGLOBIN GLYCOSYLATED A1C: CPT | Performed by: NURSE PRACTITIONER

## 2021-01-12 PROCEDURE — 258N000003 HC RX IP 258 OP 636: Performed by: ANESTHESIOLOGY

## 2021-01-12 PROCEDURE — 360N000084 HC SURGERY LEVEL 4 W/ FLUORO, PER MIN: Performed by: SURGERY

## 2021-01-12 PROCEDURE — 255N000002 HC RX 255 OP 636: Performed by: SURGERY

## 2021-01-12 PROCEDURE — 272N000001 HC OR GENERAL SUPPLY STERILE: Performed by: SURGERY

## 2021-01-12 PROCEDURE — 85018 HEMOGLOBIN: CPT | Performed by: ANESTHESIOLOGY

## 2021-01-12 PROCEDURE — 85049 AUTOMATED PLATELET COUNT: CPT | Performed by: NURSE PRACTITIONER

## 2021-01-12 PROCEDURE — 250N000011 HC RX IP 250 OP 636: Performed by: SURGERY

## 2021-01-12 PROCEDURE — 258N000003 HC RX IP 258 OP 636: Performed by: NURSE ANESTHETIST, CERTIFIED REGISTERED

## 2021-01-12 PROCEDURE — 86901 BLOOD TYPING SEROLOGIC RH(D): CPT | Performed by: ANESTHESIOLOGY

## 2021-01-12 PROCEDURE — 250N000011 HC RX IP 250 OP 636: Performed by: ANESTHESIOLOGY

## 2021-01-12 PROCEDURE — 047N3ZZ DILATION OF LEFT POPLITEAL ARTERY, PERCUTANEOUS APPROACH: ICD-10-PCS | Performed by: SURGERY

## 2021-01-12 PROCEDURE — C1730 CATH, EP, 19 OR FEW ELECT: HCPCS | Performed by: SURGERY

## 2021-01-12 PROCEDURE — 250N000011 HC RX IP 250 OP 636: Performed by: NURSE ANESTHETIST, CERTIFIED REGISTERED

## 2021-01-12 PROCEDURE — C1894 INTRO/SHEATH, NON-LASER: HCPCS | Performed by: SURGERY

## 2021-01-12 PROCEDURE — 37224 PR REVASCULARIZE FEM/POP ARTERY, ANGIOPLASTY: CPT | Mod: LT | Performed by: SURGERY

## 2021-01-12 PROCEDURE — 84132 ASSAY OF SERUM POTASSIUM: CPT | Performed by: ANESTHESIOLOGY

## 2021-01-12 PROCEDURE — 250N000009 HC RX 250: Performed by: NURSE ANESTHETIST, CERTIFIED REGISTERED

## 2021-01-12 PROCEDURE — 999N000015 HC STATISTIC ARTERIAL MONITORING DAILY

## 2021-01-12 PROCEDURE — 258N000003 HC RX IP 258 OP 636: Performed by: NURSE PRACTITIONER

## 2021-01-12 PROCEDURE — 370N000017 HC ANESTHESIA TECHNICAL FEE, PER MIN: Performed by: SURGERY

## 2021-01-12 PROCEDURE — 999N000083 HC STATISTIC IR STAFF TIME IN THE OR

## 2021-01-12 PROCEDURE — 36415 COLL VENOUS BLD VENIPUNCTURE: CPT | Performed by: NURSE PRACTITIONER

## 2021-01-12 PROCEDURE — 710N000010 HC RECOVERY PHASE 1, LEVEL 2, PER MIN: Performed by: SURGERY

## 2021-01-12 PROCEDURE — 047L3ZZ DILATION OF LEFT FEMORAL ARTERY, PERCUTANEOUS APPROACH: ICD-10-PCS | Performed by: SURGERY

## 2021-01-12 PROCEDURE — 999N001017 HC STATISTIC GLUCOSE BY METER IP

## 2021-01-12 PROCEDURE — 250N000012 HC RX MED GY IP 250 OP 636 PS 637: Performed by: NURSE PRACTITIONER

## 2021-01-12 PROCEDURE — 86900 BLOOD TYPING SEROLOGIC ABO: CPT | Performed by: ANESTHESIOLOGY

## 2021-01-12 PROCEDURE — 250N000013 HC RX MED GY IP 250 OP 250 PS 637: Performed by: NURSE PRACTITIONER

## 2021-01-12 PROCEDURE — 120N000002 HC R&B MED SURG/OB UMMC

## 2021-01-12 PROCEDURE — 250N000013 HC RX MED GY IP 250 OP 250 PS 637: Performed by: STUDENT IN AN ORGANIZED HEALTH CARE EDUCATION/TRAINING PROGRAM

## 2021-01-12 PROCEDURE — 999N000141 HC STATISTIC PRE-PROCEDURE NURSING ASSESSMENT: Performed by: SURGERY

## 2021-01-12 PROCEDURE — C1769 GUIDE WIRE: HCPCS | Performed by: SURGERY

## 2021-01-12 PROCEDURE — C1725 CATH, TRANSLUMIN NON-LASER: HCPCS | Performed by: SURGERY

## 2021-01-12 PROCEDURE — C1887 CATHETER, GUIDING: HCPCS | Performed by: SURGERY

## 2021-01-12 PROCEDURE — 999N000157 HC STATISTIC RCP TIME EA 10 MIN

## 2021-01-12 PROCEDURE — 86850 RBC ANTIBODY SCREEN: CPT | Performed by: ANESTHESIOLOGY

## 2021-01-12 PROCEDURE — 250N000025 HC SEVOFLURANE, PER MIN: Performed by: SURGERY

## 2021-01-12 PROCEDURE — 36415 COLL VENOUS BLD VENIPUNCTURE: CPT | Performed by: ANESTHESIOLOGY

## 2021-01-12 RX ORDER — NICOTINE POLACRILEX 4 MG
15-30 LOZENGE BUCCAL
Status: DISCONTINUED | OUTPATIENT
Start: 2021-01-12 | End: 2021-01-13 | Stop reason: HOSPADM

## 2021-01-12 RX ORDER — IODIXANOL 270 MG/ML
INJECTION, SOLUTION INTRAVASCULAR PRN
Status: DISCONTINUED | OUTPATIENT
Start: 2021-01-12 | End: 2021-01-12 | Stop reason: HOSPADM

## 2021-01-12 RX ORDER — LIDOCAINE 40 MG/G
CREAM TOPICAL
Status: DISCONTINUED | OUTPATIENT
Start: 2021-01-12 | End: 2021-01-13 | Stop reason: HOSPADM

## 2021-01-12 RX ORDER — ONDANSETRON 4 MG/1
4 TABLET, ORALLY DISINTEGRATING ORAL EVERY 6 HOURS PRN
Status: DISCONTINUED | OUTPATIENT
Start: 2021-01-12 | End: 2021-01-13 | Stop reason: HOSPADM

## 2021-01-12 RX ORDER — ACETAMINOPHEN 325 MG/1
975 TABLET ORAL EVERY 8 HOURS
Status: DISCONTINUED | OUTPATIENT
Start: 2021-01-12 | End: 2021-01-13 | Stop reason: HOSPADM

## 2021-01-12 RX ORDER — PHENYLEPHRINE HCL IN 0.9% NACL 50MG/250ML
0.5-6 PLASTIC BAG, INJECTION (ML) INTRAVENOUS CONTINUOUS
Status: DISCONTINUED | OUTPATIENT
Start: 2021-01-12 | End: 2021-01-12 | Stop reason: HOSPADM

## 2021-01-12 RX ORDER — SODIUM CHLORIDE, SODIUM LACTATE, POTASSIUM CHLORIDE, CALCIUM CHLORIDE 600; 310; 30; 20 MG/100ML; MG/100ML; MG/100ML; MG/100ML
INJECTION, SOLUTION INTRAVENOUS CONTINUOUS
Status: DISCONTINUED | OUTPATIENT
Start: 2021-01-12 | End: 2021-01-12 | Stop reason: HOSPADM

## 2021-01-12 RX ORDER — SODIUM CHLORIDE 9 MG/ML
INJECTION, SOLUTION INTRAVENOUS CONTINUOUS
Status: DISCONTINUED | OUTPATIENT
Start: 2021-01-12 | End: 2021-01-13 | Stop reason: HOSPADM

## 2021-01-12 RX ORDER — EPHEDRINE SULFATE 50 MG/ML
INJECTION, SOLUTION INTRAMUSCULAR; INTRAVENOUS; SUBCUTANEOUS PRN
Status: DISCONTINUED | OUTPATIENT
Start: 2021-01-12 | End: 2021-01-12

## 2021-01-12 RX ORDER — CEFAZOLIN SODIUM 2 G/100ML
2 INJECTION, SOLUTION INTRAVENOUS
Status: COMPLETED | OUTPATIENT
Start: 2021-01-12 | End: 2021-01-12

## 2021-01-12 RX ORDER — CLOPIDOGREL BISULFATE 75 MG/1
75 TABLET ORAL DAILY
Status: DISCONTINUED | OUTPATIENT
Start: 2021-01-13 | End: 2021-01-13 | Stop reason: HOSPADM

## 2021-01-12 RX ORDER — CEFAZOLIN SODIUM 1 G/3ML
1 INJECTION, POWDER, FOR SOLUTION INTRAMUSCULAR; INTRAVENOUS EVERY 8 HOURS
Status: COMPLETED | OUTPATIENT
Start: 2021-01-12 | End: 2021-01-13

## 2021-01-12 RX ORDER — AMOXICILLIN 250 MG
1 CAPSULE ORAL 2 TIMES DAILY
Status: DISCONTINUED | OUTPATIENT
Start: 2021-01-12 | End: 2021-01-13 | Stop reason: HOSPADM

## 2021-01-12 RX ORDER — ONDANSETRON 2 MG/ML
4 INJECTION INTRAMUSCULAR; INTRAVENOUS EVERY 6 HOURS PRN
Status: DISCONTINUED | OUTPATIENT
Start: 2021-01-12 | End: 2021-01-13 | Stop reason: HOSPADM

## 2021-01-12 RX ORDER — CLOPIDOGREL BISULFATE 75 MG/1
150 TABLET ORAL DAILY
Status: DISCONTINUED | OUTPATIENT
Start: 2021-01-12 | End: 2021-01-12

## 2021-01-12 RX ORDER — HYDROMORPHONE HYDROCHLORIDE 1 MG/ML
.3-.5 INJECTION, SOLUTION INTRAMUSCULAR; INTRAVENOUS; SUBCUTANEOUS EVERY 5 MIN PRN
Status: DISCONTINUED | OUTPATIENT
Start: 2021-01-12 | End: 2021-01-12 | Stop reason: HOSPADM

## 2021-01-12 RX ORDER — NALOXONE HYDROCHLORIDE 0.4 MG/ML
0.4 INJECTION, SOLUTION INTRAMUSCULAR; INTRAVENOUS; SUBCUTANEOUS
Status: ACTIVE | OUTPATIENT
Start: 2021-01-12 | End: 2021-01-13

## 2021-01-12 RX ORDER — HEPARIN SODIUM 5000 [USP'U]/.5ML
INJECTION, SOLUTION INTRAVENOUS; SUBCUTANEOUS PRN
Status: DISCONTINUED | OUTPATIENT
Start: 2021-01-12 | End: 2021-01-12

## 2021-01-12 RX ORDER — HYDROMORPHONE HYDROCHLORIDE 1 MG/ML
.3-.5 INJECTION, SOLUTION INTRAMUSCULAR; INTRAVENOUS; SUBCUTANEOUS
Status: DISCONTINUED | OUTPATIENT
Start: 2021-01-12 | End: 2021-01-13 | Stop reason: HOSPADM

## 2021-01-12 RX ORDER — ONDANSETRON 2 MG/ML
4 INJECTION INTRAMUSCULAR; INTRAVENOUS EVERY 30 MIN PRN
Status: DISCONTINUED | OUTPATIENT
Start: 2021-01-12 | End: 2021-01-12 | Stop reason: HOSPADM

## 2021-01-12 RX ORDER — LIDOCAINE 40 MG/G
CREAM TOPICAL
Status: DISCONTINUED | OUTPATIENT
Start: 2021-01-12 | End: 2021-01-12 | Stop reason: HOSPADM

## 2021-01-12 RX ORDER — FENTANYL CITRATE 50 UG/ML
INJECTION, SOLUTION INTRAMUSCULAR; INTRAVENOUS PRN
Status: DISCONTINUED | OUTPATIENT
Start: 2021-01-12 | End: 2021-01-12

## 2021-01-12 RX ORDER — ASPIRIN 600 MG/1
600 SUPPOSITORY RECTAL ONCE
Status: COMPLETED | OUTPATIENT
Start: 2021-01-12 | End: 2021-01-12

## 2021-01-12 RX ORDER — DEXTROSE MONOHYDRATE 25 G/50ML
25-50 INJECTION, SOLUTION INTRAVENOUS
Status: DISCONTINUED | OUTPATIENT
Start: 2021-01-12 | End: 2021-01-13 | Stop reason: HOSPADM

## 2021-01-12 RX ORDER — DEXAMETHASONE SODIUM PHOSPHATE 4 MG/ML
INJECTION, SOLUTION INTRA-ARTICULAR; INTRALESIONAL; INTRAMUSCULAR; INTRAVENOUS; SOFT TISSUE PRN
Status: DISCONTINUED | OUTPATIENT
Start: 2021-01-12 | End: 2021-01-12

## 2021-01-12 RX ORDER — SIMVASTATIN 40 MG
40 TABLET ORAL AT BEDTIME
Status: DISCONTINUED | OUTPATIENT
Start: 2021-01-12 | End: 2021-01-13 | Stop reason: HOSPADM

## 2021-01-12 RX ORDER — ASPIRIN 81 MG/1
81 TABLET ORAL DAILY
Status: DISCONTINUED | OUTPATIENT
Start: 2021-01-12 | End: 2021-01-12

## 2021-01-12 RX ORDER — NALOXONE HYDROCHLORIDE 0.4 MG/ML
0.2 INJECTION, SOLUTION INTRAMUSCULAR; INTRAVENOUS; SUBCUTANEOUS
Status: ACTIVE | OUTPATIENT
Start: 2021-01-12 | End: 2021-01-13

## 2021-01-12 RX ORDER — ACETAMINOPHEN 325 MG/1
650 TABLET ORAL EVERY 4 HOURS PRN
Status: DISCONTINUED | OUTPATIENT
Start: 2021-01-15 | End: 2021-01-13 | Stop reason: HOSPADM

## 2021-01-12 RX ORDER — ASPIRIN 81 MG/1
81 TABLET ORAL DAILY
Status: DISCONTINUED | OUTPATIENT
Start: 2021-01-13 | End: 2021-01-13 | Stop reason: HOSPADM

## 2021-01-12 RX ORDER — DIPHENHYDRAMINE HCL 12.5MG/5ML
12.5 LIQUID (ML) ORAL EVERY 6 HOURS PRN
Status: DISCONTINUED | OUTPATIENT
Start: 2021-01-12 | End: 2021-01-13 | Stop reason: HOSPADM

## 2021-01-12 RX ORDER — LISINOPRIL 2.5 MG/1
2.5 TABLET ORAL EVERY EVENING
Status: DISCONTINUED | OUTPATIENT
Start: 2021-01-12 | End: 2021-01-13 | Stop reason: HOSPADM

## 2021-01-12 RX ORDER — LIDOCAINE HYDROCHLORIDE 20 MG/ML
INJECTION, SOLUTION INFILTRATION; PERINEURAL PRN
Status: DISCONTINUED | OUTPATIENT
Start: 2021-01-12 | End: 2021-01-12

## 2021-01-12 RX ORDER — CEFAZOLIN SODIUM 1 G/3ML
1 INJECTION, POWDER, FOR SOLUTION INTRAMUSCULAR; INTRAVENOUS SEE ADMIN INSTRUCTIONS
Status: DISCONTINUED | OUTPATIENT
Start: 2021-01-12 | End: 2021-01-12 | Stop reason: HOSPADM

## 2021-01-12 RX ORDER — ONDANSETRON 4 MG/1
4 TABLET, ORALLY DISINTEGRATING ORAL EVERY 30 MIN PRN
Status: DISCONTINUED | OUTPATIENT
Start: 2021-01-12 | End: 2021-01-12 | Stop reason: HOSPADM

## 2021-01-12 RX ORDER — HEPARIN SODIUM 5000 [USP'U]/.5ML
5000 INJECTION, SOLUTION INTRAVENOUS; SUBCUTANEOUS EVERY 12 HOURS
Status: DISCONTINUED | OUTPATIENT
Start: 2021-01-13 | End: 2021-01-13 | Stop reason: HOSPADM

## 2021-01-12 RX ORDER — ONDANSETRON 2 MG/ML
INJECTION INTRAMUSCULAR; INTRAVENOUS PRN
Status: DISCONTINUED | OUTPATIENT
Start: 2021-01-12 | End: 2021-01-12

## 2021-01-12 RX ORDER — FERROUS SULFATE 325(65) MG
325 TABLET ORAL
Status: DISCONTINUED | OUTPATIENT
Start: 2021-01-13 | End: 2021-01-13 | Stop reason: HOSPADM

## 2021-01-12 RX ORDER — AMOXICILLIN 250 MG
2 CAPSULE ORAL 2 TIMES DAILY
Status: DISCONTINUED | OUTPATIENT
Start: 2021-01-12 | End: 2021-01-13 | Stop reason: HOSPADM

## 2021-01-12 RX ORDER — METHOCARBAMOL 500 MG/1
500 TABLET, FILM COATED ORAL 4 TIMES DAILY PRN
Status: DISCONTINUED | OUTPATIENT
Start: 2021-01-12 | End: 2021-01-13 | Stop reason: HOSPADM

## 2021-01-12 RX ORDER — FENTANYL CITRATE 50 UG/ML
25-50 INJECTION, SOLUTION INTRAMUSCULAR; INTRAVENOUS
Status: DISCONTINUED | OUTPATIENT
Start: 2021-01-12 | End: 2021-01-12 | Stop reason: HOSPADM

## 2021-01-12 RX ORDER — DIPHENHYDRAMINE HYDROCHLORIDE 50 MG/ML
12.5 INJECTION INTRAMUSCULAR; INTRAVENOUS EVERY 6 HOURS PRN
Status: DISCONTINUED | OUTPATIENT
Start: 2021-01-12 | End: 2021-01-13 | Stop reason: HOSPADM

## 2021-01-12 RX ORDER — PROPOFOL 10 MG/ML
INJECTION, EMULSION INTRAVENOUS PRN
Status: DISCONTINUED | OUTPATIENT
Start: 2021-01-12 | End: 2021-01-12

## 2021-01-12 RX ORDER — OXYCODONE HYDROCHLORIDE 5 MG/1
5-10 TABLET ORAL EVERY 4 HOURS PRN
Status: DISCONTINUED | OUTPATIENT
Start: 2021-01-12 | End: 2021-01-13 | Stop reason: HOSPADM

## 2021-01-12 RX ADMIN — PROPOFOL 150 MG: 10 INJECTION, EMULSION INTRAVENOUS at 08:11

## 2021-01-12 RX ADMIN — ACETAMINOPHEN 975 MG: 325 TABLET, FILM COATED ORAL at 22:34

## 2021-01-12 RX ADMIN — PHENYLEPHRINE HYDROCHLORIDE 40 MCG: 10 INJECTION INTRAVENOUS at 10:59

## 2021-01-12 RX ADMIN — Medication 2 G: at 08:20

## 2021-01-12 RX ADMIN — PHENYLEPHRINE HYDROCHLORIDE 100 MCG: 10 INJECTION INTRAVENOUS at 08:34

## 2021-01-12 RX ADMIN — ROCURONIUM BROMIDE 10 MG: 10 INJECTION INTRAVENOUS at 10:01

## 2021-01-12 RX ADMIN — SODIUM CHLORIDE, POTASSIUM CHLORIDE, SODIUM LACTATE AND CALCIUM CHLORIDE: 600; 310; 30; 20 INJECTION, SOLUTION INTRAVENOUS at 08:00

## 2021-01-12 RX ADMIN — ROCURONIUM BROMIDE 10 MG: 10 INJECTION INTRAVENOUS at 09:53

## 2021-01-12 RX ADMIN — PHENYLEPHRINE HYDROCHLORIDE 50 MCG: 10 INJECTION INTRAVENOUS at 09:33

## 2021-01-12 RX ADMIN — FENTANYL CITRATE 25 MCG: 50 INJECTION, SOLUTION INTRAMUSCULAR; INTRAVENOUS at 12:48

## 2021-01-12 RX ADMIN — Medication 1 G: at 10:20

## 2021-01-12 RX ADMIN — DOCUSATE SODIUM 50 MG AND SENNOSIDES 8.6 MG 1 TABLET: 8.6; 5 TABLET, FILM COATED ORAL at 20:28

## 2021-01-12 RX ADMIN — FENTANYL CITRATE 50 MCG: 50 INJECTION, SOLUTION INTRAMUSCULAR; INTRAVENOUS at 11:35

## 2021-01-12 RX ADMIN — CEFAZOLIN 1 G: 330 INJECTION, POWDER, FOR SOLUTION INTRAMUSCULAR; INTRAVENOUS at 17:51

## 2021-01-12 RX ADMIN — FENTANYL CITRATE 50 MCG: 50 INJECTION, SOLUTION INTRAMUSCULAR; INTRAVENOUS at 09:27

## 2021-01-12 RX ADMIN — ROCURONIUM BROMIDE 50 MG: 10 INJECTION INTRAVENOUS at 08:11

## 2021-01-12 RX ADMIN — SODIUM CHLORIDE, POTASSIUM CHLORIDE, SODIUM LACTATE AND CALCIUM CHLORIDE: 600; 310; 30; 20 INJECTION, SOLUTION INTRAVENOUS at 11:29

## 2021-01-12 RX ADMIN — DEXAMETHASONE SODIUM PHOSPHATE 4 MG: 4 INJECTION, SOLUTION INTRA-ARTICULAR; INTRALESIONAL; INTRAMUSCULAR; INTRAVENOUS; SOFT TISSUE at 11:09

## 2021-01-12 RX ADMIN — ONDANSETRON 4 MG: 2 INJECTION INTRAMUSCULAR; INTRAVENOUS at 11:09

## 2021-01-12 RX ADMIN — LISINOPRIL 2.5 MG: 2.5 TABLET ORAL at 20:28

## 2021-01-12 RX ADMIN — HEPARIN SODIUM 5000 UNITS: 5000 INJECTION, SOLUTION INTRAVENOUS; SUBCUTANEOUS at 09:25

## 2021-01-12 RX ADMIN — Medication 10 MG: at 08:25

## 2021-01-12 RX ADMIN — SUGAMMADEX 200 MG: 100 INJECTION, SOLUTION INTRAVENOUS at 11:40

## 2021-01-12 RX ADMIN — FENTANYL CITRATE 100 MCG: 50 INJECTION, SOLUTION INTRAMUSCULAR; INTRAVENOUS at 08:11

## 2021-01-12 RX ADMIN — PHENYLEPHRINE HYDROCHLORIDE 50 MCG: 10 INJECTION INTRAVENOUS at 09:00

## 2021-01-12 RX ADMIN — HEPARIN SODIUM 3000 UNITS: 5000 INJECTION, SOLUTION INTRAVENOUS; SUBCUTANEOUS at 10:22

## 2021-01-12 RX ADMIN — ROCURONIUM BROMIDE 10 MG: 10 INJECTION INTRAVENOUS at 10:49

## 2021-01-12 RX ADMIN — CLOPIDOGREL BISULFATE 150 MG: 75 TABLET ORAL at 14:37

## 2021-01-12 RX ADMIN — PHENYLEPHRINE HYDROCHLORIDE 100 MCG: 10 INJECTION INTRAVENOUS at 08:19

## 2021-01-12 RX ADMIN — Medication 0.5 MCG/KG/MIN: at 09:08

## 2021-01-12 RX ADMIN — ROCURONIUM BROMIDE 20 MG: 10 INJECTION INTRAVENOUS at 08:43

## 2021-01-12 RX ADMIN — Medication 5 MG: at 09:05

## 2021-01-12 RX ADMIN — INSULIN ASPART 1 UNITS: 100 INJECTION, SOLUTION INTRAVENOUS; SUBCUTANEOUS at 16:30

## 2021-01-12 RX ADMIN — SODIUM CHLORIDE: 9 INJECTION, SOLUTION INTRAVENOUS at 12:42

## 2021-01-12 RX ADMIN — PHENYLEPHRINE HYDROCHLORIDE 100 MCG: 10 INJECTION INTRAVENOUS at 09:06

## 2021-01-12 RX ADMIN — PHENYLEPHRINE HYDROCHLORIDE 100 MCG: 10 INJECTION INTRAVENOUS at 08:43

## 2021-01-12 RX ADMIN — ASPIRIN 600 MG: 600 SUPPOSITORY RECTAL at 13:51

## 2021-01-12 RX ADMIN — SIMVASTATIN 40 MG: 40 TABLET, FILM COATED ORAL at 22:34

## 2021-01-12 RX ADMIN — LIDOCAINE HYDROCHLORIDE 100 MG: 20 INJECTION, SOLUTION INFILTRATION; PERINEURAL at 08:11

## 2021-01-12 RX ADMIN — PHENYLEPHRINE HYDROCHLORIDE 100 MCG: 10 INJECTION INTRAVENOUS at 08:53

## 2021-01-12 RX ADMIN — PHENYLEPHRINE HYDROCHLORIDE 100 MCG: 10 INJECTION INTRAVENOUS at 08:27

## 2021-01-12 RX ADMIN — PHENYLEPHRINE HYDROCHLORIDE 100 MCG: 10 INJECTION INTRAVENOUS at 08:39

## 2021-01-12 RX ADMIN — ACETAMINOPHEN 975 MG: 325 TABLET, FILM COATED ORAL at 14:37

## 2021-01-12 ASSESSMENT — ACTIVITIES OF DAILY LIVING (ADL)
WEAR_GLASSES_OR_BLIND: YES
WERE_AUXILIARY_AIDS_OFFERED?: YES
DRESSING/BATHING_DIFFICULTY: NO
DESCRIBE_HEARING_LOSS: BILATERAL HEARING LOSS
DIFFICULTY_EATING/SWALLOWING: NO
FALL_HISTORY_WITHIN_LAST_SIX_MONTHS: NO
ADLS_ACUITY_SCORE: 20
WALKING_OR_CLIMBING_STAIRS_DIFFICULTY: NO
USE_OF_HEARING_ASSISTIVE_DEVICES: BILATERAL HEARING AIDS
DIFFICULTY_COMMUNICATING: NO
THE_FOLLOWING_AIDS_WERE_PROVIDED;: PATIENT DECLINED OFFER OF AUXILIARY AIDS
ADLS_ACUITY_SCORE: 20
DOING_ERRANDS_INDEPENDENTLY_DIFFICULTY: NO
HEARING_DIFFICULTY_OR_DEAF: YES
PATIENT_/_FAMILY_COMMUNICATION_STYLE: SPOKEN LANGUAGE (ENGLISH OR BILINGUAL)
FALL_HISTORY_WITHIN_LAST_SIX_MONTHS: NO
TOILETING_ISSUES: NO
PATIENT'S_PREFERRED_MEANS_OF_COMMUNICATION: VERBAL
WALKING_OR_CLIMBING_STAIRS_DIFFICULTY: NO
DIFFICULTY_COMMUNICATING: NO
WEAR_GLASSES_OR_BLIND: YES
DRESSING/BATHING_DIFFICULTY: NO
CONCENTRATING,_REMEMBERING_OR_MAKING_DECISIONS_DIFFICULTY: NO
DIFFICULTY_EATING/SWALLOWING: NO
CONCENTRATING,_REMEMBERING_OR_MAKING_DECISIONS_DIFFICULTY: NO

## 2021-01-12 ASSESSMENT — MIFFLIN-ST. JEOR
SCORE: 1532.75
SCORE: 1588.75
SCORE: 1578.75

## 2021-01-12 ASSESSMENT — COPD QUESTIONNAIRES: COPD: 1

## 2021-01-12 NOTE — OP NOTE
Date of procedure: January 12, 2021    Preop Dx: Atherosclerosis of native arteries of the left lower extremity with nonhealing ankle ulcer    Postop Dx: same     Procedure:   1. Open exposure of the left femoral artery for device delivery  2. Pelvic arteriogram  3. Placement of catheter in left popliteal artery, left lower extremity arteriogram  4. Balloon angioplasty of the left superficial femoral and popliteal arteries using 4 X 20 mm [place] balloon     Surgeon: ADAM Maharaj MD     Assistant: Omega Tran MD     Anesthesia: general     Complications: None     EBL: 50 ml    Radiation issa: 71 mGy    Contrast: 70 ml Visipaque    Specimens: none     Indications: This 75 year old YO male with a past medical history significant for CAD, DM2, HTN, CKD Stage 3, and tobacco abuse presented with a nonhealing ulcer on the left medial malleolus. CTA demonstrated extensive calcific occlusive disease involving the iliac, femoral and popliteal arteries. To increase inflow, a left femoral endarterectomy with possible iliac stenting was planned. The patient understood that additional procedures may be necessary depending on the status of the infrainguinal vasculature. I discussed the risks and benefits of the procedure with the patient, and consent was signed.    Findings: Diffusely calcified common femoral and external iliac arteries without suitable areas for clamping.. A pelvic arteriogram was performed via left superficial femoral access; this demonstrated widely patent common iliac, external iliac and common femoral arteries without hemodynamically significant stenoses. The superficial femoral and popliteal arteries were diffusely diseased with near occlusive lesions. These were dilated with a 4 X 200 mm [place] balloon with restoration of rapid flow and satisfactory angiographic result. The posterior tibial artery was patent and represented single runoff flow to the foot.     Description of operation: The patient  was brought to the operating room and placed in the supine position. After a satisfactory level of general anesthesia, the patient's groins and anterior thighs were prepped and draped in the usual sterile fashion. A time out was called. A longitudinal incision was made over the left femoral sheath and extended through the subcutaneous tissue, taking care to ligate lymphatic tissue with 3-0 silk ties. The femoral artery was identified within the femoral sheath, and numerous large branches were dissected circumferentially and encircled with vessiloops. Palpation of the femoral and external iliac arteries disclosed circumferential calcification that would not be amenable to cross-clamping. The superficial femoral artery was soft in its proximal segment, and the decision was made to perform an arteriogram through the SFA to assess the inflow. The patient was anticoagulated with 5000 units of IV heparin. The SFA was accessed with a microkit and a 5 Fr sheath was easily placed. A pelvic arteriogram was performed through the sheath, demonstrating widely patent common iliac, external iliac and common femoral arteries. The sheath was redirected distally and a left lower extremity arteriogram was performed. This demonstrated diffuse, heavily calcified stenoses from the level of the sheath to the infrapopliteal artery. A [place] wire was passed through the sheath and guided with a [place] catheter into the popliteal artery. Entry into the lumen of the popliteal artery was confirmed angiographically. The popliteal and superficial femoral arteries were dilated from the below-knee segment to the proximal SFA using a 4 X 20 mm [place] balloon. Residual stenoses in the infrageniculate popliteal and proximal superficial femoral arteries were dilated with a 4 X 40 [place] balloon. Completion angiography demonstrated widely patent arteries with minimal residual stenoses and no evidence of perforation or dissection. The wire,  catheter and sheath were removed and the arterial puncture site ws closed with a 5-0 prolene suture. The incision was closed using 3-0 vicryl for the deep layers and 4-0 monocryl for the skin. The skin was dressed with skin glue and the patient was transported to the PACU in satisfactory condition. I was present, scrubbed and supervised all key and critical portions of this case.      ADAM Maharaj MD  Professor of Surgery  Division of Vascular Surgery

## 2021-01-12 NOTE — ANESTHESIA POSTPROCEDURE EVALUATION
Anesthesia POST Procedure Evaluation    Patient: Amos Walker   MRN:     [id number] Gender:   adult   Age:    75 year old :      1945        Preoperative Diagnosis: Non-healing ulcer (H) [L98.499]  PAD (peripheral artery disease) (H) [I73.9]   Procedure(s):  Left Femoral Artery Expore for Delivery of Vascular Access, Left Femoral Arteriogram, Ballon Dilation of Left Superficial Femoral and Popliteal Artery   Postop Comments: No value filed.     Anesthesia Type: General       Disposition: Admission   Postop Pain Control: Uneventful            Sign Out: Well controlled pain   PONV: No   Neuro/Psych: Uneventful            Sign Out: Acceptable/Baseline neuro status   Airway/Respiratory: Uneventful            Sign Out: Acceptable/Baseline resp. status   CV/Hemodynamics: Uneventful            Sign Out: Acceptable CV status   Other NRE: NONE   DID A NON-ROUTINE EVENT OCCUR? No         Last Anesthesia Record Vitals:  CRNA VITALS  2021 1119 - 2021 1219      2021             Pulse:  102    ART BP:  131/48    ART Mean:  83    SpO2:  100 %    Resp Rate (observed):  (!) 1          Last PACU Vitals:  Vitals Value Taken Time   /61 21 1320   Temp 35.9  C (96.6  F) 21 1300   Pulse 79 21 1326   Resp 11 21 1300   SpO2 99 % 21 1326   Temp src     NIBP     Pulse     SpO2     Resp     Temp     Ht Rate     Temp 2     Vitals shown include unvalidated device data.      Electronically Signed By: Maryam Faria MD, 2021, 1:27 PM

## 2021-01-12 NOTE — BRIEF OP NOTE
[facility]     Brief Operative Note    Pre-operative diagnosis: Non-healing ulcer (H) [L98.499]  PAD (peripheral artery disease) (H) [I73.9]  Post-operative diagnosis Same as pre-operative diagnosis    Procedure: Procedure(s):  Left Femoral Artery Expore for Delivery of Vascular Access, Left Femoral Arteriogram, Ballon Dilation of Left Superficial Femoral and Popliteal Artery  Surgeon: Surgeon(s) and Role:     * Augusto Maharaj MD - Primary     * Omega Tran MD - Fellow - Assisting  Anesthesia: General   Estimated blood loss: Less than 50 ml  Drains: None  Specimens: * No specimens in log *  Findings:   We did an open exposure of the CFA and SFA on left side.  We did an angiogram of the aorta and iliac system.  This was widely patent.  We then did an angiogram of the left LE.  We intervened and did balloon angioplasty of the SFA and AK and BK pop.  There was a good result.  Good hemostasis at the end of the case.  Incision closed in 4 layers with skin closed with running 4-0 monocryl.  There was a loud PT signal at the end of the case.  No DP signal.  On CTA also, PT was dominant run off. .  Complications: None.  Implants: * No implants in log *    The patient will now be on Plavix.  A loading dose of 150 mg will be given today.  He will then be on 75 mg daily.  ADAT this evening  Minimal Fluids  Up out of bed after 6 hours to the chair  ABIs with toe pressures tomorrow  Likely home tomorrow after ABIs

## 2021-01-12 NOTE — ANESTHESIA CARE TRANSFER NOTE
Patient: Amos Walker    Procedure(s):  Left Femoral Artery Expore for Delivery of Vascular Access, Left Femoral Arteriogram, Ballon Dilation of Left Superficial Femoral and Popliteal Artery    Diagnosis: Non-healing ulcer (H) [L98.499]  PAD (peripheral artery disease) (H) [I73.9]  Diagnosis Additional Information: No value filed.    Anesthesia Type:   General     Note:  Airway :Face Mask  Patient transferred to:PACU  Comments: To PACU on supplemental O2 with + air exchange, placed on monitor. Report given to RN, questions answered, VSS, patient alert and comfortable.Handoff Report: Identifed the Patient, Identified the Reponsible Provider, Reviewed the pertinent medical history, Discussed the surgical course, Reviewed Intra-OP anesthesia mangement and issues during anesthesia, Set expectations for post-procedure period and Allowed opportunity for questions and acknowledgement of understanding      Vitals: (Last set prior to Anesthesia Care Transfer)    CRNA VITALS  1/12/2021 1119 - 1/12/2021 1154      1/12/2021             Pulse:  102    ART BP:  131/48    ART Mean:  83    SpO2:  100 %    Resp Rate (observed):  (!) 1            Electronically Signed By: LEW Simmons CRNA  January 12, 2021  11:54 AM
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
## 2021-01-12 NOTE — ANESTHESIA PROCEDURE NOTES
Arterial Line Procedure Note      Staff -   Anesthesiologist:  Maryam Faria MD  Performed By: anesthesiologist    Location: In OR After Induction  Procedure Start/Stop Times:     patient identified, IV checked, site marked, risks and benefits discussed, informed consent, monitors and equipment checked, pre-op evaluation and at physician/surgeon's request      Correct Patient: Yes      Correct Position: Yes      Correct Site: Yes      Correct Procedure: Yes      Correct Laterality:  Yes    Site Marked:  Yes  Line Placement:     Procedure:  Arterial Line    Insertion Site:  Radial    Insertion laterality:  Left    Skin Prep: Chloraprep      Patient Prep: patient draped, mask, sterile gloves, hat and hand hygiene      Local skin infiltration:  None    Ultrasound Guided?: No      Catheter size:  20 gauge, Quick cath    Cath secured with: suture      Dressing:  Tegaderm    Complications:  None obvious    Arterial waveform: Yes      IBP within 10% of NIBP: Yes

## 2021-01-13 ENCOUNTER — APPOINTMENT (OUTPATIENT)
Dept: ULTRASOUND IMAGING | Facility: CLINIC | Age: 76
DRG: 254 | End: 2021-01-13
Attending: NURSE PRACTITIONER
Payer: COMMERCIAL

## 2021-01-13 VITALS
OXYGEN SATURATION: 96 % | DIASTOLIC BLOOD PRESSURE: 55 MMHG | WEIGHT: 168.43 LBS | TEMPERATURE: 98 F | RESPIRATION RATE: 16 BRPM | HEIGHT: 74 IN | SYSTOLIC BLOOD PRESSURE: 105 MMHG | HEART RATE: 69 BPM | BODY MASS INDEX: 21.62 KG/M2

## 2021-01-13 LAB
ANION GAP SERPL CALCULATED.3IONS-SCNC: 6 MMOL/L (ref 3–14)
BASOPHILS # BLD AUTO: 0 10E9/L (ref 0–0.2)
BASOPHILS NFR BLD AUTO: 0.5 %
BUN SERPL-MCNC: 28 MG/DL (ref 7–30)
CALCIUM SERPL-MCNC: 8.2 MG/DL (ref 8.5–10.1)
CHLORIDE SERPL-SCNC: 109 MMOL/L (ref 94–109)
CO2 SERPL-SCNC: 24 MMOL/L (ref 20–32)
CREAT SERPL-MCNC: 1.24 MG/DL (ref 0.66–1.25)
DIFFERENTIAL METHOD BLD: ABNORMAL
EOSINOPHIL # BLD AUTO: 0.1 10E9/L (ref 0–0.7)
EOSINOPHIL NFR BLD AUTO: 1.2 %
ERYTHROCYTE [DISTWIDTH] IN BLOOD BY AUTOMATED COUNT: 12.6 % (ref 10–15)
GFR SERPL CREATININE-BSD FRML MDRD: 57 ML/MIN/{1.73_M2}
GLUCOSE BLDC GLUCOMTR-MCNC: 162 MG/DL (ref 70–99)
GLUCOSE BLDC GLUCOMTR-MCNC: 253 MG/DL (ref 70–99)
GLUCOSE SERPL-MCNC: 169 MG/DL (ref 70–99)
HCT VFR BLD AUTO: 29.1 % (ref 40–53)
HGB BLD-MCNC: 9.6 G/DL (ref 13.3–17.7)
IMM GRANULOCYTES # BLD: 0 10E9/L (ref 0–0.4)
IMM GRANULOCYTES NFR BLD: 0.5 %
LYMPHOCYTES # BLD AUTO: 1.5 10E9/L (ref 0.8–5.3)
LYMPHOCYTES NFR BLD AUTO: 22.5 %
MCH RBC QN AUTO: 33 PG (ref 26.5–33)
MCHC RBC AUTO-ENTMCNC: 33 G/DL (ref 31.5–36.5)
MCV RBC AUTO: 100 FL (ref 78–100)
MONOCYTES # BLD AUTO: 0.7 10E9/L (ref 0–1.3)
MONOCYTES NFR BLD AUTO: 10.4 %
NEUTROPHILS # BLD AUTO: 4.2 10E9/L (ref 1.6–8.3)
NEUTROPHILS NFR BLD AUTO: 64.9 %
NRBC # BLD AUTO: 0 10*3/UL
NRBC BLD AUTO-RTO: 0 /100
PLATELET # BLD AUTO: 129 10E9/L (ref 150–450)
POTASSIUM SERPL-SCNC: 4 MMOL/L (ref 3.4–5.3)
RBC # BLD AUTO: 2.91 10E12/L (ref 4.4–5.9)
SODIUM SERPL-SCNC: 139 MMOL/L (ref 133–144)
WBC # BLD AUTO: 6.5 10E9/L (ref 4–11)

## 2021-01-13 PROCEDURE — 999N001017 HC STATISTIC GLUCOSE BY METER IP

## 2021-01-13 PROCEDURE — 85025 COMPLETE CBC W/AUTO DIFF WBC: CPT | Performed by: NURSE PRACTITIONER

## 2021-01-13 PROCEDURE — 250N000013 HC RX MED GY IP 250 OP 250 PS 637: Performed by: NURSE PRACTITIONER

## 2021-01-13 PROCEDURE — 99207 PR SATISFY VISIT NUMBER: CPT | Performed by: SURGERY

## 2021-01-13 PROCEDURE — 93922 UPR/L XTREMITY ART 2 LEVELS: CPT

## 2021-01-13 PROCEDURE — 99024 POSTOP FOLLOW-UP VISIT: CPT | Performed by: SURGERY

## 2021-01-13 PROCEDURE — 93922 UPR/L XTREMITY ART 2 LEVELS: CPT | Mod: 26 | Performed by: RADIOLOGY

## 2021-01-13 PROCEDURE — 250N000011 HC RX IP 250 OP 636: Performed by: NURSE PRACTITIONER

## 2021-01-13 PROCEDURE — 36415 COLL VENOUS BLD VENIPUNCTURE: CPT | Performed by: NURSE PRACTITIONER

## 2021-01-13 PROCEDURE — 80048 BASIC METABOLIC PNL TOTAL CA: CPT | Performed by: NURSE PRACTITIONER

## 2021-01-13 PROCEDURE — 258N000003 HC RX IP 258 OP 636: Performed by: NURSE PRACTITIONER

## 2021-01-13 RX ORDER — ACETAMINOPHEN 325 MG/1
650 TABLET ORAL EVERY 4 HOURS PRN
COMMUNITY
Start: 2021-01-15 | End: 2023-08-17

## 2021-01-13 RX ADMIN — INSULIN ASPART 1 UNITS: 100 INJECTION, SOLUTION INTRAVENOUS; SUBCUTANEOUS at 07:57

## 2021-01-13 RX ADMIN — ASPIRIN 81 MG: 81 TABLET, COATED ORAL at 07:57

## 2021-01-13 RX ADMIN — DOCUSATE SODIUM 50 MG AND SENNOSIDES 8.6 MG 2 TABLET: 8.6; 5 TABLET, FILM COATED ORAL at 07:57

## 2021-01-13 RX ADMIN — CLOPIDOGREL BISULFATE 75 MG: 75 TABLET ORAL at 07:57

## 2021-01-13 RX ADMIN — SODIUM CHLORIDE: 9 INJECTION, SOLUTION INTRAVENOUS at 02:27

## 2021-01-13 RX ADMIN — HEPARIN SODIUM 5000 UNITS: 5000 INJECTION, SOLUTION INTRAVENOUS; SUBCUTANEOUS at 06:03

## 2021-01-13 RX ADMIN — ACETAMINOPHEN 975 MG: 325 TABLET, FILM COATED ORAL at 06:02

## 2021-01-13 RX ADMIN — CEFAZOLIN 1 G: 330 INJECTION, POWDER, FOR SOLUTION INTRAMUSCULAR; INTRAVENOUS at 02:20

## 2021-01-13 RX ADMIN — FERROUS SULFATE TAB 325 MG (65 MG ELEMENTAL FE) 325 MG: 325 (65 FE) TAB at 07:57

## 2021-01-13 ASSESSMENT — ACTIVITIES OF DAILY LIVING (ADL)
ADLS_ACUITY_SCORE: 20

## 2021-01-13 NOTE — PLAN OF CARE
"/67 (BP Location: Left arm)   Pulse 92   Temp 96.4  F (35.8  C) (Oral)   Resp 13   Ht 1.88 m (6' 2\")   Wt 76.4 kg (168 lb 6.9 oz)   SpO2 97%   BMI 21.63 kg/m      Transferred from PACU at approximately 1530.     Admitted/transferred from:   2 RN full   skin assessment completed by Yojana Cota RN and Danielle Anderson RN.  Skin assessment finding: issues found : R leg celllulitis, L medial malleolus wound, covered currently with a primapore dressing. Charcot diabetes foot.       Interventions/actions:  Team paged about WOC consult and/or wound care orders. Pt educated about the importance of shifting weight while in bed.     Will continue to monitor.    Neuro: A&Ox4; Calls appropriately  Respiratory: sats mid 90s on RA. Denies SOB.   Cardiac: HR: intermittent tachy.   GI/: hypoactive BS, no BM this shift. Voiding adequate amounts with frequency.   Diet: Moderate CHO diet, tolerating well  Pain: Denies  Incisions/Drains: Left groin site incision, dermabonded. No drainage.    IV Access: L PIV- infusing NS at 70 mL/hr. R PIV- SL   Labs: Reviewed  Activity: Has not gotten out of bed yet. Turns side to side independently    Plan: Continue to monitor and follow plan of care.     "
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
## 2021-01-13 NOTE — PLAN OF CARE
"/55 (BP Location: Right arm)   Pulse 69   Temp 98  F (36.7  C) (Oral)   Resp 16   Ht 1.88 m (6' 2\")   Wt 76.4 kg (168 lb 6.9 oz)   SpO2 96%   BMI 21.63 kg/m      Neuro: A&Ox4; Calls appropriately  Respiratory: sats mid 90s on RA. Denies SOB.   Cardiac: WNL   GI/: +BS, +passing flatus, one BM this shift. Voiding adequate amounts with frequency.   Diet: Moderate CHO diet, tolerating well  Pain: Denies  Incisions/Drains: Left groin site incision, dermabonded. No drainage.    IV Access: L and R PIV- removed for discharge.   Labs: Reviewed  Activity: Up with stand by assist.      Pt discharging. Discharge paperwork was reviewed with patient. Pt expressed understanding. A copy was given to patient. Pt discharging home. Wife will transport. Discharge medications available in pharmacy, pt was advised to pickup medications before he leaves. In-house transportation arranged for transport to discharge pharmacy/lobby. All patient belongings were taken with patient.      "

## 2021-01-13 NOTE — PLAN OF CARE
"Vital signs:  Temp: 98.5  F (36.9  C) Temp src: Oral BP: 107/52 Pulse: 74   Resp: 18 SpO2: 98 % O2 Device: None (Room air) Oxygen Delivery: 1 LPM Height: 188 cm (6' 2\") Weight: 76.4 kg (168 lb 6.9 oz)    6107-8655:  Activity: Up with assist of 2 and gait belt to bathroom. Commode and walker ordered.   Neuros: A & O x4. Neuro intact. [place], has hearing aides.   Cardiac: WDL. Asymptomatic.  Respiratory: Lung sounds diminished. O2 sats high 90s on RA. On capnography, IPI 10.   GI/: BS+, passing flatus, had one formed medium brown BM. Voiding adequate amounts in urinal.   Diet: Mod CHO diet, ate ice cream and donna crackers.   Skin: Left femoral incision dermabonded, TERRI, no drainage, slightly erythematous. Anjum skin BLE. Left ankle wound primapore dressing c/d/I. CMS intact, bilateral feet warm to touch. Pedal pulses Dopplerable.   Lines: MIVF infusing via PIV. Cefazolin 1g infused per orders.   Drains: None.   Labs: Bedtime , administered 2 units sliding scale insulin.  at 0200.   Pain/nausea: C/o left femoral incision pain with movement, denies pain at rest. Patient only requested for scheduled Tylenol, declined oxycodone. Denies nausea.   New changes this shift: None.   Plan: Continue POC.     "

## 2021-01-13 NOTE — PROGRESS NOTES
"VASCULAR SURGERY PROGRESS NOTE    Subjective:  No acute events overnight.  Pain well controlled, patient eating and drinking without issue.  Plan for CAROL/TBI this morning.    Objective:    Intake/Output Summary (Last 24 hours) at 1/13/2021 0946  Last data filed at 1/13/2021 0907  Gross per 24 hour   Intake 2786.67 ml   Output 2925 ml   Net -138.33 ml     Labs:  ROUTINE IP LABS (Last four results)  BMP  Recent Labs   Lab 01/13/21  0700 01/12/21  0737     --    POTASSIUM 4.0 4.4   CHLORIDE 109  --    CLAUDIA 8.2*  --    CO2 24  --    BUN 28  --    CR 1.24  --    *  --      CBC  Recent Labs   Lab 01/13/21  0700 01/12/21  1720 01/12/21  0737   WBC 6.5  --   --    RBC 2.91*  --   --    HGB 9.6*  --  11.8*   HCT 29.1*  --   --      --   --    MCH 33.0  --   --    MCHC 33.0  --   --    RDW 12.6  --   --    * 133*  --      INRNo lab results found in last 7 days.  PHYSICAL EXAM:  /55 (BP Location: Right arm)   Pulse 69   Temp 98  F (36.7  C) (Oral)   Resp 16   Ht 1.88 m (6' 2\")   Wt 76.4 kg (168 lb 6.9 oz)   SpO2 96%   BMI 21.63 kg/m    General: The patient is alert and oriented. Appropriate. No acute distress  Psych: pleasant affect, answers questions appropriately  Skin: Color appropriate for race, warm, dry.  Respiratory: The patient does not require supplemental oxygen. Breathing unlabored  GI:  Abdomen soft, nontender to light palpation.  Extremities: Left groin incision CDI with Dermabond in place, DP/PT signal present on doppler.  Medial ankle wound stable without surrounding edema or erythema.      Imaging:   CAROL/TBI pending    ASSESSMENT:  75 year old male with PMH significant for PAD with left medial malleolus non-healing ulcer, now s/p balloon angioplasty of the left SFA and popliteal arteries.  Doing well postoperatively.      PLAN:  -Discharge today  -Continue home medications including aspirin and Plavix  -Smoking cessation- will refer to outpatient smoking " cessation  -Continue home wound care.  -Follow up arranged.    Ana Maria Syed DNP, APRN, AGNP-C  Division of Vascular Surgery   [facility]  Pager: [phone]

## 2021-01-13 NOTE — PROGRESS NOTES
"Post Op Check    01/12/2021    Amos Walker is a 75 year old adult with h/o extensive calcific occlusive disease involving the iliac, femoral, and popliteal arterys now POD#0 s/p Open exposure of left femoral artery, pelvic arteriogram, balloon angioplasty of left SFA.    Pt reports tenderness at incision. Denies SOB, chest pain, or dizziness. No BM. Voiding freely.    /67 (BP Location: Left arm)   Pulse 92   Temp 96.4  F (35.8  C) (Oral)   Resp 13   Ht 1.88 m (6' 2\")   Wt 76.4 kg (168 lb 6.9 oz)   SpO2 97%   BMI 21.63 kg/m      Gen: A&O x3, NAD, cooperative   Chest: breathing non-labored on room air  Abdomen: soft, non-tender, non-distended  Incision: clean, dry, intact with dermabond skin glue   Extremities: warm and well perfused, non healing ulcer on L medial malleolus     A/P: No acute post-op issues. Continue plan of care per primary team. Please call with any questions.    Dieter Troy MD, PGY1  General Surgery  (6AM-5PM please page primary team, nights/weekends/holidays page job code [phone])    "

## 2021-01-14 ENCOUNTER — PATIENT OUTREACH (OUTPATIENT)
Dept: ONCOLOGY | Facility: CLINIC | Age: 76
End: 2021-01-14

## 2021-01-14 ENCOUNTER — TELEPHONE (OUTPATIENT)
Dept: VASCULAR SURGERY | Facility: CLINIC | Age: 76
End: 2021-01-14
Payer: COMMERCIAL

## 2021-01-14 NOTE — TELEPHONE ENCOUNTER
Dylon Bardales,  Mr. Walker is mutual pt who your team discharged from Hospital on 1/13/21.  I am forwarding the patient encounter so you can follow up on the TCM workflow.  Thank you,  Katy Damian RN, OCN  Care Coordinator  [facility] line [phone]

## 2021-01-14 NOTE — TELEPHONE ENCOUNTER
Referring providers name, location, phone number and fax: Active Request: [id number]  Ana Maria Syed, NP  Phone [phone]  Fax not found    Reason for visit:   Follow up with Vascular surgery MARJORIE in 2 weeks for incision check and with Dr. Maharaj in 4-6 weeks.    Does patient have a referral:  Appt active Request in Epic    Referral to specific provider? Dr Maharaj    Medical records or notes if possible: In Epic    Please set up appts with Pt: follow up with Vascular surgery MARJORIE in 2 weeks for incision check and with Dr. Maharaj in 4-6 weeks.    Pt prefers to communicate with [employer] but messages can be left on his home number.    Thank you!

## 2021-01-18 ENCOUNTER — TELEPHONE (OUTPATIENT)
Dept: VASCULAR SURGERY | Facility: CLINIC | Age: 76
End: 2021-01-18

## 2021-01-18 NOTE — TELEPHONE ENCOUNTER
----- Message from Ana Maria Syed NP sent at 1/13/2021 10:02 AM CST -----  Good morning,  Could you arrange for a 2 week virtual visit with me and 6 week follow up with Dr. Maharaj with CAROL prior?    Thanks!  Ana Maria

## 2021-01-18 NOTE — TELEPHONE ENCOUNTER
The pt is scheduled for a video visit with Ana Maria Syed on 1/25. The pt is also scheduled for imaging on 2/22 for imaging at the [facility] and a video visit with Dr. Maharaj on 2/25.  Mehran 1/18

## 2021-01-22 ENCOUNTER — OFFICE VISIT (OUTPATIENT)
Dept: PODIATRY | Facility: CLINIC | Age: 76
End: 2021-01-22
Payer: COMMERCIAL

## 2021-01-22 VITALS — DIASTOLIC BLOOD PRESSURE: 61 MMHG | HEART RATE: 66 BPM | OXYGEN SATURATION: 99 % | SYSTOLIC BLOOD PRESSURE: 163 MMHG

## 2021-01-22 DIAGNOSIS — E11.51 DIABETES MELLITUS WITH PERIPHERAL VASCULAR DISEASE (H): ICD-10-CM

## 2021-01-22 DIAGNOSIS — E11.49 TYPE II OR UNSPECIFIED TYPE DIABETES MELLITUS WITH NEUROLOGICAL MANIFESTATIONS, NOT STATED AS UNCONTROLLED(250.60) (H): Primary | ICD-10-CM

## 2021-01-22 DIAGNOSIS — I87.8 VENOUS STASIS: ICD-10-CM

## 2021-01-22 DIAGNOSIS — L97.322 SKIN ULCER OF LEFT ANKLE WITH FAT LAYER EXPOSED (H): ICD-10-CM

## 2021-01-22 PROCEDURE — 99214 OFFICE O/P EST MOD 30 MIN: CPT | Performed by: PODIATRIST
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
## 2021-01-22 NOTE — PATIENT INSTRUCTIONS
Thanks for coming today.  Ortho[facility] Medicine Clinic  [address] [place] [address]    To schedule future appointments in Ortho Clinic, you may call [phone].    To schedule ordered imaging by your provider:   Call Central Imaging [phone]    To schedule an injection ordered by your provider:  Call Central Imaging Injection scheduling line: [phone]  [employer] available online at:  [email or web address]    Please call if any further questions or concerns ([phone]).  Clinic hours 8 am to 5 pm.    Return to clinic (call) if symptoms worsen or fail to improve.

## 2021-01-22 NOTE — NURSING NOTE
Amos Walker's chief complaint for this visit includes:  Chief Complaint   Patient presents with     RECHECK     left ankle ulcer     PCP: Racheal Swift    Referring Provider:  No referring provider defined for this encounter.    BP (!) 163/61 (BP Location: Right arm, Patient Position: Sitting, Cuff Size: Adult Regular)   Pulse 66   SpO2 99%   Data Unavailable     Do you need any medication refills at today's visit? No    Maya Vidal CMA
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
## 2021-01-25 ENCOUNTER — VIRTUAL VISIT (OUTPATIENT)
Dept: VASCULAR SURGERY | Facility: CLINIC | Age: 76
End: 2021-01-25
Payer: COMMERCIAL

## 2021-01-25 DIAGNOSIS — I70.243 ATHEROSCLEROSIS OF NATIVE ARTERY OF LEFT LOWER EXTREMITY WITH ULCERATION OF ANKLE (H): Primary | ICD-10-CM

## 2021-01-25 DIAGNOSIS — Z72.0 TOBACCO ABUSE: ICD-10-CM

## 2021-01-25 PROCEDURE — 99024 POSTOP FOLLOW-UP VISIT: CPT | Performed by: NURSE PRACTITIONER

## 2021-01-25 ASSESSMENT — PAIN SCALES - GENERAL: PAINLEVEL: NO PAIN (0)

## 2021-01-25 NOTE — NURSING NOTE
Vascular Rooming Note     Amos Walker's goals for this visit include:   Chief Complaint   Patient presents with     NAVARRO Trinidad, is participating in a virtual visit today for a 2 week post op left femoral arteriogram incision check, feeling good, some itching, seems to be healing well,  no concerns at this time, as reported by patient.     Christiana Hill LPN

## 2021-01-25 NOTE — PATIENT INSTRUCTIONS
-We strongly recommend you quit smoking.  If you change your mind regarding the smoking cessation clinic or nicotine replacement, please send a [employer] message or call our clinic coordinator.  -Walking of at least 30 minutes a day recommended  -Continue your would care with Dr. Mcclain  -Continue Asprin, plavix, and simvastatin  -Follow up with Dr. Maharaj on 2/25 with CAROL prior to the visit.    With questions, concerns, or to request an appointment, please call either:    Yen Laws, Care Coordinator RN, Vascular Surgery  [phone]    Vascular Call Center  [phone]    To contact someone after 5 pm, on a weekend, or on a Holiday, please call:  [facility]  [phone], option 4 to have the attending physician for Vascular Surgery Service paged.

## 2021-01-25 NOTE — PROGRESS NOTES
Amos is a 75 year old who is being evaluated via a billable telephone visit.      What phone number would you like to be contacted at?   [phone]  How would you like to obtain your AVS? Gabriela  Assessment & Plan   Problem List Items Addressed This Visit     Atherosclerosis of native artery of left lower extremity with ulceration of ankle (H) - Primary      Other Visit Diagnoses     Tobacco abuse               30 minutes spent on the date of the encounter doing chart review, history and exam, documentation and further activities as noted above         Tobacco Cessation:   reports that he has been smoking cigarettes. He has a 25.00 pack-year smoking history. He has never used smokeless tobacco.  Tobacco Cessation Action Plan: Information offered: Patient not interested at this time      Assessment and Plan:  75 year old male with PAD with left medial malleolus ulcer and tobacco abuse S/p balloon angioplasty of the left SFA and popliteal arteries.  Patient continues to smoke, we had an extensive conversation regarding the importance of smoking cessation as this patient is an extremely high amputation risk with limited revascularization options.  I offered referral to the smoking cessation clinic and for nicotine replacements which the patient declined at this time.  We also discussed the importance of regular walking as the patient has been inactive.  We discussed going to a mall as a place to safely walk during the winter months.    -Smoking cessation strongly encouraged, resources offered and patient instructed to think about it.  He can send a message at any point for a referral or nicotine replacement.  -Increase daily walking with a goal of 30 minutes per day.  -Continue aspirin, plavix, and simvastatin  -Follow up with Dr. Maharaj on 2/25 with CAROL prior to visit.    No follow-ups on file.    Ana Maria Syed NP  [facility]ULAR [facility] CHAVEZ Trinidad is a 75 year old who presents  to clinic today for the following health issues:      HPI       75 year old male with PMH significant for CAD, DM2, HTN, stage 3 CKD, tobacco abuse, and PAD with non-healing left medial maleolus ulceration.  He is s/p balloon angioplasty of the Left SFA and popliteal arteries on 1/12/2021 with Dr. Maharaj.  He reports he is doing well.  He follows with Dr. Mcclain for his foot wound and reports the wound is improving.  He does not ambulate much other than in the house.  He also continues to smoke.  He understands he should stop smoking and the risk for amputation, but he is declining smoking cessation clinic.  He has tried it in the past and did not find it helpful.  He is also declining nicotine replacement as he feels he has more of a behavioral addiction to cigarettes than to the nicotine.  His incision is healing well and he has minimal pain that is relieved with tylenol.  He denies symptoms of claudication or rest pain.    Review of Systems   Negative unless stated in HPI      Objective    Vitals - Patient Reported  Pain Score: No Pain (0)(taking tylenol)        Physical Exam   healthy, alert and no distress  PSYCH: Alert and oriented times 3; coherent speech, normal   rate and volume, able to articulate logical thoughts, able   to abstract reason, no tangential thoughts, no hallucinations   or delusions  His affect is normal  RESP: No cough, no audible wheezing, able to talk in full sentences  Remainder of exam unable to be completed due to telephone visits      I spent a total of 15 minutes face-to-face with Amos Walker during today's office visit.  Over 50% of this time was spent counseling the patient and/or coordinating care regarding post-operative check up, PAD, smoking cessation, and increasing activity.  See note for details.        Phone call duration: 15 minutes  [phone]

## 2021-01-25 NOTE — LETTER
1/25/2021       RE: Amos Walker  [address]  [facility] [address]     Dear Colleague,    Thank you for referring your patient, Amos Walker, to the [facility]ASCULAR [facility] [place] at [facility]. Please see a copy of my visit note below.    Amos is a 75 year old who is being evaluated via a billable telephone visit.      What phone number would you like to be contacted at?   [phone]  How would you like to obtain your AVS? 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 He has a 25.00 pack-year smoking history. He has never used smokeless tobacco.  Tobacco Cessation Action Plan: Information offered: Patient not interested at this time      Assessment and Plan:  75 year old male with PAD with left medial malleolus ulcer and tobacco abuse S/p balloon angioplasty of the left SFA and popliteal arteries.  Patient continues to smoke, we had an extensive conversation regarding the importance of smoking cessation as this patient is an extremely high amputation risk with limited revascularization options.  I offered referral to the smoking cessation clinic and for nicotine replacements which the patient declined at this time.  We also discussed the importance of regular walking as the patient has been inactive.  We discussed going to a mall as a place to safely walk during the winter months.    -Smoking cessation strongly encouraged, resources offered and patient instructed to think about it.  He can send a message at any point for a referral or nicotine replacement.  -Increase  daily walking with a goal of 30 minutes per day.  -Continue aspirin, plavix, and simvastatin  -Follow up with Dr. Maharaj on 2/25 with CAROL prior to visit.    No follow-ups on file.    Ana Maria Syed NP  [facility] CHAVEZ Trinidad is a 75 year old who presents to clinic today for the following health issues:      HPI       75 year old male with PMH significant for CAD, DM2, HTN, stage 3 CKD, tobacco abuse, and PAD with non-healing left medial maleolus ulceration.  He is s/p balloon angioplasty of the Left SFA and popliteal arteries on 1/12/2021 with Dr. Maharaj.  He reports he is doing well.  He follows with Dr. Mcclain for his foot wound and reports the wound is improving.  He does not ambulate much other than in the house.  He also continues to smoke.  He understands he should stop smoking and the risk for amputation, but he is declining smoking cessation clinic.  He has tried it in the past and did not find it helpful.  He is also declining nicotine replacement as he feels he has more of a behavioral addiction to cigarettes than to the nicotine.  His incision is healing well and he has minimal pain that is relieved with tylenol.  He denies symptoms of claudication or rest pain.    Review of Systems   Negative unless stated in HPI      Objective    Vitals - Patient Reported  Pain Score: No Pain (0)(taking tylenol)        Physical Exam   healthy, alert and no distress  PSYCH: Alert and oriented times 3; coherent speech, normal   rate and volume, able to articulate logical thoughts, able   to abstract reason, no tangential thoughts, no hallucinations   or delusions  His affect is normal  RESP: No cough, no audible wheezing, able to talk in full sentences  Remainder of exam unable to be completed due to telephone visits      I spent a total of 15 minutes face-to-face with Amos Walker during today's office visit.  Over 50% of this time was spent counseling the patient  and/or coordinating care regarding post-operative check up, PAD, smoking cessation, and increasing activity.  See note for details.        Phone call duration: 15 minutes  [phone]      Again, thank you for allowing me to participate in the care of your patient.      Sincerely,    Ana Maria Syed NP

## 2021-01-28 ENCOUNTER — TELEPHONE (OUTPATIENT)
Dept: PODIATRY | Facility: CLINIC | Age: 76
End: 2021-01-28

## 2021-01-28 NOTE — TELEPHONE ENCOUNTER
Need to check benefit for 2021.    Financial Counselor Review for Apligraf, Dermagraft, Puraply AM, Affinity, NuShield:    Which product(s) to be checked: Apligraf, Dermagraft, Puraply AM, Affinity, NuShield    Has the patient tried any of these products before: YES    Was it for this wound: YES (2 applications of Apligraf 09/2020)    Diagnosis code (include ICD-10 code): E11.49, I87.8, E11.51, L97.322    Coverage and patient financial responsibility information:YES    Does patient need to be contacted by Financial Counselor:YES    Note: Do not use abbreviations and route encounter to [facility] [place] [[id number]]

## 2021-01-28 NOTE — TELEPHONE ENCOUNTER
bv for submitted to [facility], requested to be done asap for 1/29/21

## 2021-01-29 ENCOUNTER — OFFICE VISIT (OUTPATIENT)
Dept: PODIATRY | Facility: CLINIC | Age: 76
End: 2021-01-29
Payer: COMMERCIAL

## 2021-01-29 VITALS — DIASTOLIC BLOOD PRESSURE: 69 MMHG | HEART RATE: 94 BPM | OXYGEN SATURATION: 99 % | SYSTOLIC BLOOD PRESSURE: 145 MMHG

## 2021-01-29 DIAGNOSIS — E11.51 DIABETES MELLITUS WITH PERIPHERAL VASCULAR DISEASE (H): ICD-10-CM

## 2021-01-29 DIAGNOSIS — I87.8 VENOUS STASIS: ICD-10-CM

## 2021-01-29 DIAGNOSIS — E11.49 TYPE II OR UNSPECIFIED TYPE DIABETES MELLITUS WITH NEUROLOGICAL MANIFESTATIONS, NOT STATED AS UNCONTROLLED(250.60) (H): Primary | ICD-10-CM

## 2021-01-29 PROCEDURE — 99207 PR NO CHARGE LOS: CPT | Performed by: PODIATRIST

## 2021-01-29 PROCEDURE — 15271 SKIN SUB GRAFT TRNK/ARM/LEG: CPT | Performed by: PODIATRIST

## 2021-01-29 NOTE — PATIENT INSTRUCTIONS
Thanks for coming today.  Ortho/Sports Medicine Clinic  [address] [place] [address]    To schedule future appointments in Ortho Clinic, you may call [phone].    To schedule ordered imaging by your provider:   Call [facility] [phone]    To schedule an injection ordered by your provider:  Call [facility] Injection scheduling line: [phone]  [employer] available online at:  [email or web address]    Please call if any further questions or concerns ([phone]).  Clinic hours 8 am to 5 pm.    Return to clinic (call) if symptoms worsen or fail to improve.
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
## 2021-01-29 NOTE — NURSING NOTE
Amos Walker's chief complaint for this visit includes:  Chief Complaint   Patient presents with     RECHECK     left medial ankle ulcer     PCP: Racheal Swift    Referring Provider:  No referring provider defined for this encounter.    BP (!) 145/69 (BP Location: Right arm, Patient Position: Sitting, Cuff Size: Adult Regular)   Pulse 94   SpO2 99%   Data Unavailable     Do you need any medication refills at today's visit? Yesenia Vidal CMA
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
## 2021-02-03 ENCOUNTER — OFFICE VISIT (OUTPATIENT)
Dept: PODIATRY | Facility: CLINIC | Age: 76
End: 2021-02-03
Payer: COMMERCIAL

## 2021-02-03 VITALS — DIASTOLIC BLOOD PRESSURE: 63 MMHG | HEART RATE: 104 BPM | SYSTOLIC BLOOD PRESSURE: 145 MMHG | OXYGEN SATURATION: 100 %

## 2021-02-03 DIAGNOSIS — L97.322 SKIN ULCER OF LEFT ANKLE WITH FAT LAYER EXPOSED (H): ICD-10-CM

## 2021-02-03 DIAGNOSIS — E11.51 DIABETES MELLITUS WITH PERIPHERAL VASCULAR DISEASE (H): ICD-10-CM

## 2021-02-03 DIAGNOSIS — E11.49 TYPE II OR UNSPECIFIED TYPE DIABETES MELLITUS WITH NEUROLOGICAL MANIFESTATIONS, NOT STATED AS UNCONTROLLED(250.60) (H): Primary | ICD-10-CM

## 2021-02-03 DIAGNOSIS — I87.8 VENOUS STASIS: ICD-10-CM

## 2021-02-03 PROCEDURE — 99213 OFFICE O/P EST LOW 20 MIN: CPT | Performed by: PODIATRIST
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
## 2021-02-04 NOTE — TELEPHONE ENCOUNTER
PB DOS: TBD- didn't get this one had to re-fax the whole thing off  Type of Procedure: Apligraf, Dermagraft, Puraply AM, Affinity, NuShield  CPT Codes: Q  ICD10 Codes: E11.49, I87.8, E11.51, L97.322  Surgeon/Ordering provider: Johny  Pre-cert/Authorization completed:  Yes, [employer] of care benefit faxed in  Payer: [employer] Medicare  Date Checked: 2/4/2021  Spoke to faxed in to [employer] [place] of care  Ref. # na/ Auth # na  Valid Dates: na       Fax: [phone]

## 2021-02-05 ENCOUNTER — VIRTUAL VISIT (OUTPATIENT)
Dept: INTERNAL MEDICINE | Facility: CLINIC | Age: 76
End: 2021-02-05
Payer: COMMERCIAL
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
## 2021-02-05 DIAGNOSIS — I73.9 PAD (PERIPHERAL ARTERY DISEASE) (H): Primary | ICD-10-CM

## 2021-02-05 DIAGNOSIS — E11.49 TYPE II OR UNSPECIFIED TYPE DIABETES MELLITUS WITH NEUROLOGICAL MANIFESTATIONS, NOT STATED AS UNCONTROLLED(250.60) (H): ICD-10-CM

## 2021-02-05 DIAGNOSIS — I10 ESSENTIAL HYPERTENSION: ICD-10-CM

## 2021-02-05 PROCEDURE — 99213 OFFICE O/P EST LOW 20 MIN: CPT | Mod: 95 | Performed by: INTERNAL MEDICINE

## 2021-02-05 RX ORDER — LISINOPRIL 2.5 MG/1
7.5 TABLET ORAL DAILY
Qty: 270 TABLET | Refills: 3 | COMMUNITY
Start: 2021-02-05 | End: 2021-02-17
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
## 2021-02-10 ENCOUNTER — OFFICE VISIT (OUTPATIENT)
Dept: ORTHOPEDICS | Facility: CLINIC | Age: 76
End: 2021-02-10
Payer: COMMERCIAL

## 2021-02-10 ENCOUNTER — OFFICE VISIT (OUTPATIENT)
Dept: PODIATRY | Facility: CLINIC | Age: 76
End: 2021-02-10
Payer: COMMERCIAL

## 2021-02-10 ENCOUNTER — ANCILLARY PROCEDURE (OUTPATIENT)
Dept: GENERAL RADIOLOGY | Facility: CLINIC | Age: 76
End: 2021-02-10
Attending: FAMILY MEDICINE
Payer: COMMERCIAL

## 2021-02-10 VITALS — HEART RATE: 95 BPM | SYSTOLIC BLOOD PRESSURE: 135 MMHG | DIASTOLIC BLOOD PRESSURE: 72 MMHG | OXYGEN SATURATION: 100 %

## 2021-02-10 DIAGNOSIS — E11.51 DIABETES MELLITUS WITH PERIPHERAL VASCULAR DISEASE (H): ICD-10-CM

## 2021-02-10 DIAGNOSIS — I87.8 VENOUS STASIS: ICD-10-CM

## 2021-02-10 DIAGNOSIS — M25.532 LEFT WRIST PAIN: ICD-10-CM

## 2021-02-10 DIAGNOSIS — E11.49 TYPE II OR UNSPECIFIED TYPE DIABETES MELLITUS WITH NEUROLOGICAL MANIFESTATIONS, NOT STATED AS UNCONTROLLED(250.60) (H): Primary | ICD-10-CM

## 2021-02-10 DIAGNOSIS — S52.092A OTHER CLOSED FRACTURE OF PROXIMAL END OF LEFT ULNA, INITIAL ENCOUNTER: Primary | ICD-10-CM

## 2021-02-10 DIAGNOSIS — M25.522 LEFT ELBOW PAIN: ICD-10-CM

## 2021-02-10 DIAGNOSIS — L97.322 SKIN ULCER OF LEFT ANKLE WITH FAT LAYER EXPOSED (H): ICD-10-CM

## 2021-02-10 LAB — RADIOLOGIST FLAGS: NORMAL

## 2021-02-10 PROCEDURE — 99213 OFFICE O/P EST LOW 20 MIN: CPT | Performed by: PODIATRIST

## 2021-02-10 PROCEDURE — 73110 X-RAY EXAM OF WRIST: CPT | Mod: LT | Performed by: RADIOLOGY

## 2021-02-10 PROCEDURE — 73080 X-RAY EXAM OF ELBOW: CPT | Mod: LT | Performed by: RADIOLOGY

## 2021-02-10 PROCEDURE — 99204 OFFICE O/P NEW MOD 45 MIN: CPT | Performed by: FAMILY MEDICINE

## 2021-02-10 NOTE — PATIENT INSTRUCTIONS
Thanks for coming today.  Ortho/Sports Medicine Clinic  [address] [place] [address]    To schedule future appointments in Ortho Clinic, you may call [phone].    To schedule ordered imaging by your Provider: Call [facility] at [phone]    [facility] available online at:   [email or web address]    Please call if any further questions or concerns [phone] and ask for the Orthopedic Department. Clinic hours 8 am to 5 pm.    Return to clinic if symptoms worsen.

## 2021-02-10 NOTE — TELEPHONE ENCOUNTER
Writer refaxed benefit check with new Dx codes to [facility] for review per the clinics request. Pending.
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
## 2021-02-10 NOTE — LETTER
2/10/2021         RE: Amos Walker  [address] [place] [address]        Dear Colleague,    Thank you for referring your patient, Amos Walker, to the [facility] [place]. Please see a copy of my visit note below.      [facility]  SPORTS MEDICINE CLINIC VISIT     Feb 10, 2021        ASSESSMENT & PLAN    75-year-old with left proximal ulna fracture doing remarkably well given the appearance of the fracture for only 7 days post injury and no directed treatment..    Reviewed imaging and assessment with patient in detail  Placed in a splint and sling today.  Have recommended surgical follow-up.  He will be contacted by our surgical team within 24 hours to discuss follow-up appointment and likely surgical intervention.    Hua Simon MD  [facility]    -----  Chief Complaint   Patient presents with     Consult     left elbow and wrist pain, slipped on ice DOI 2/3/21       SUBJECTIVE  Amos Walker is a/an 75 year old adult who is seen in consultation at the request of Dr. Mcclain for evaluation of  Left elbow and wrist pain.     The patient is seen by themselves.    Onset: 1 week(s) ago. Patient describes injury as a fall on the ice on 2/3/2021 had significant swelling and ecchymosis last week.  This seems to be improving.  His motion is improving.  Still has pain to direct pressure over the proximal ulna.  Location of Pain: left wrist and elbow   Worsened by: movement    Better with: rest and wrapping     REVIEW OF SYSTEMS:    Do you have fever, chills, weight loss? No    Do you have any vision problems? No    Do you have any chest pain or edema? No    Do you have any shortness of breath or wheezing?  No    Do you have stomach problems? No    Do you have any numbness or focal weakness? No    Do you have diabetes? No    Do you have problems with bleeding or clotting? No    Do you have an rashes or other skin lesions?  No    OBJECTIVE:  There were no vitals taken for this visit.     General: Alert, pleasant, no distress  Left elbow: warm, well perfused, SILT throughout     Inspection: Diffuse ecchymosis about the forearm with most concentrated about the wrist.  Some resolving ecchymosis.  Only mild soft tissue swelling     ROM: Symmetric range of motion of the elbow with some discomfort at terminal flexion and extension full range of motion of the wrist.     Strength: Intact in flexion extension of the elbow though with some discomfort.  Otherwise intact in a median ulnar and radial nerve distribution.     Palpation: TTP over the proximal ulna and olecranon.  No TTP of the medial or lateral epicondyle, antecubital fossa or radial head     Special Tests: None    RADIOLOGY:    3 view xrays of left elbow performed and reviewed independently demonstrating nondisplaced transverse fracture of the left proximal ulna with extension into the articular surface. See EMR for formal radiology report.     View x-rays of the left wrist are performed and reviewed independently demonstrate no acute fracture or dislocation.  EMR for formal radiology report.          Again, thank you for allowing me to participate in the care of your patient.        Sincerely,        Hua Simon MD
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
## 2021-02-10 NOTE — PATIENT INSTRUCTIONS
Thanks for coming today.  Ortho[facility] Medicine Clinic  [address] [place] [address]    To schedule future appointments in Ortho Clinic, you may call [phone].    To schedule ordered imaging by your provider:   Call Central Imaging [phone]    To schedule an injection ordered by your provider:  Call Central [facility] Injection scheduling line: [phone]  [employer] available online at:  [email or web address]    Please call if any further questions or concerns ([phone]).  Clinic hours 8 am to 5 pm.    Return to clinic (call) if symptoms worsen or fail to improve.

## 2021-02-10 NOTE — NURSING NOTE
Amos Walker's chief complaint for this visit includes:  Chief Complaint   Patient presents with     RECHECK     wound care left ankle     PCP: Racheal Swift    Referring Provider:  No referring provider defined for this encounter.    BP (!) 170/76 (BP Location: Left arm, Patient Position: Sitting, Cuff Size: Adult Regular)   Pulse 95   SpO2 100%   Data Unavailable     Do you need any medication refills at today's visit? Yesenia Vidal CMA

## 2021-02-10 NOTE — TELEPHONE ENCOUNTER
Can you recheck benefits with the following codes?      e08.621, e11.621, i83.001-i83.029, i83.201-i83.229, i87.311-i87.319, or i87.331-i87.339 as he has all of these    We had to recheck this previously for him and was covered under one of these codes.

## 2021-02-10 NOTE — PROGRESS NOTES
[facility]  SPORTS MEDICINE CLINIC VISIT     Feb 10, 2021        ASSESSMENT & PLAN    75-year-old with left proximal ulna fracture doing remarkably well given the appearance of the fracture for only 7 days post injury and no directed treatment..    Reviewed imaging and assessment with patient in detail  Placed in a splint and sling today.  Have recommended surgical follow-up.  He will be contacted by our surgical team within 24 hours to discuss follow-up appointment and likely surgical intervention.    Hua Simon MD  [facility] SPORTS MEDICINE CLINIC [place]    -----  Chief Complaint   Patient presents with     Consult     left elbow and wrist pain, slipped on ice DOI 2/3/21       SUBJECTIVE  Amos Walker is a/an 75 year old adult who is seen in consultation at the request of Dr. Mcclain for evaluation of  Left elbow and wrist pain.     The patient is seen by themselves.    Onset: 1 week(s) ago. Patient describes injury as a fall on the ice on 2/3/2021 had significant swelling and ecchymosis last week.  This seems to be improving.  His motion is improving.  Still has pain to direct pressure over the proximal ulna.  Location of Pain: left wrist and elbow   Worsened by: movement    Better with: rest and wrapping     REVIEW OF SYSTEMS:    Do you have fever, chills, weight loss? No    Do you have any vision problems? No    Do you have any chest pain or edema? No    Do you have any shortness of breath or wheezing?  No    Do you have stomach problems? No    Do you have any numbness or focal weakness? No    Do you have diabetes? No    Do you have problems with bleeding or clotting? No    Do you have an rashes or other skin lesions? No    OBJECTIVE:  There were no vitals taken for this visit.     General: Alert, pleasant, no distress  Left elbow: warm, well perfused, SILT throughout     Inspection: Diffuse ecchymosis about the forearm with most concentrated about the wrist.  Some resolving  ecchymosis.  Only mild soft tissue swelling     ROM: Symmetric range of motion of the elbow with some discomfort at terminal flexion and extension full range of motion of the wrist.     Strength: Intact in flexion extension of the elbow though with some discomfort.  Otherwise intact in a median ulnar and radial nerve distribution.     Palpation: TTP over the proximal ulna and olecranon.  No TTP of the medial or lateral epicondyle, antecubital fossa or radial head     Special Tests: None    RADIOLOGY:    3 view xrays of left elbow performed and reviewed independently demonstrating nondisplaced transverse fracture of the left proximal ulna with extension into the articular surface. See EMR for formal radiology report.     View x-rays of the left wrist are performed and reviewed independently demonstrate no acute fracture or dislocation.  EMR for formal radiology report.

## 2021-02-11 ENCOUNTER — HOSPITAL ENCOUNTER (OUTPATIENT)
Facility: AMBULATORY SURGERY CENTER | Age: 76
End: 2021-02-11
Attending: ORTHOPAEDIC SURGERY | Admitting: ORTHOPAEDIC SURGERY
Payer: COMMERCIAL

## 2021-02-11 ENCOUNTER — TELEPHONE (OUTPATIENT)
Dept: ORTHOPEDICS | Facility: CLINIC | Age: 76
End: 2021-02-11

## 2021-02-11 ENCOUNTER — PREP FOR PROCEDURE (OUTPATIENT)
Dept: ORTHOPEDICS | Facility: CLINIC | Age: 76
End: 2021-02-11

## 2021-02-11 ENCOUNTER — OFFICE VISIT (OUTPATIENT)
Dept: ORTHOPEDICS | Facility: CLINIC | Age: 76
End: 2021-02-11
Payer: COMMERCIAL

## 2021-02-11 VITALS
BODY MASS INDEX: 20.76 KG/M2 | DIASTOLIC BLOOD PRESSURE: 83 MMHG | HEART RATE: 100 BPM | OXYGEN SATURATION: 99 % | HEIGHT: 75 IN | SYSTOLIC BLOOD PRESSURE: 180 MMHG | WEIGHT: 167 LBS

## 2021-02-11 DIAGNOSIS — S52.022A CLOSED FRACTURE OF OLECRANON PROCESS OF LEFT ULNA, INITIAL ENCOUNTER: Primary | ICD-10-CM

## 2021-02-11 DIAGNOSIS — E11.51 DIABETES MELLITUS WITH PERIPHERAL VASCULAR DISEASE (H): ICD-10-CM

## 2021-02-11 DIAGNOSIS — Z11.59 ENCOUNTER FOR SCREENING FOR OTHER VIRAL DISEASES: ICD-10-CM

## 2021-02-11 DIAGNOSIS — S52.022A CLOSED FRACTURE OF LEFT OLECRANON PROCESS: ICD-10-CM

## 2021-02-11 DIAGNOSIS — S52.022A CLOSED FRACTURE OF LEFT OLECRANON PROCESS: Primary | ICD-10-CM

## 2021-02-11 PROCEDURE — 29105 APPLICATION LONG ARM SPLINT: CPT

## 2021-02-11 PROCEDURE — 99214 OFFICE O/P EST MOD 30 MIN: CPT | Mod: 25 | Performed by: ORTHOPAEDIC SURGERY

## 2021-02-11 ASSESSMENT — PAIN SCALES - GENERAL: PAINLEVEL: MILD PAIN (3)

## 2021-02-11 ASSESSMENT — MIFFLIN-ST. JEOR: SCORE: 1580.2

## 2021-02-11 NOTE — TELEPHONE ENCOUNTER
Type of surgery: OPEN REDUCTION INTERNAL FIXATION, FRACTURE,LEFT ELBOW (Left)  CPT 36715   Closed fracture of olecranon process of left ulna, initial encounter S52.022A    Location of surgery: [facility]  Date and time of surgery: 2-17-21  Surgeon: Dr Jacinto  Pre-Op Appt Date: TBD  Post-Op Appt Date: TBD   Packet sent out: No  Pre-cert/Authorization completed: No prior auth required per [employer] online list.  Date: 2-12-21    Marsha Gomez  Prior Authorization Dept  [phone]

## 2021-02-11 NOTE — PROGRESS NOTES
Cast/splint application    Date/Time: 2021 3:30 PM  Performed by: Jose Nichols  Authorized by: Junaid Jacinto MD     Consent:     Consent obtained:  Verbal    Consent given by:  Patient    Risks discussed:  Swelling, discoloration, numbness and pain    Alternatives discussed:  Alternative treatment  Pre-procedure details:     Sensation:  Normal  Procedure details:     Laterality:  Left    Location:  Elbow    Splint type:  Long arm    Supplies:  Fiberglass  Post-procedure details:     Pain:  Unchanged    Pain level:  3/10    Sensation:  Normal    Patient tolerance of procedure:  Tolerated well, no immediate complications    Patient provided with cast or splint care instructions: Yes    Comments:      Cast care instructions given to patient today includin. Keep cast clean and dry: When showering/bathing use plastic bag or other impervious barrier to keep cast dry. Moisture from sweat is normal. The more clean and dry you keep your cast the less it will itch and the less it will smell. If the cast does get soaked, you can call to request a cast change, but realize it may be 1-2 days before the doctor's office can do a cast change. You can also use a hair dryer on the low setting, but this will take several hours to dry completely. If itching occurs, do not stick anything down the cast, as this may result in skin breakdown and infection. You may use a can of compressed air (with no additives) to relieve itching.   2. Elevate extremity / affected area to reduce swelling: The cast will not expand and contract the same way the splint did, therefore it will be especially important to elevate the injured area above heart level to reduce swelling, especially during the next 24-48 hours. If swelling continues and produces tingling and numbness that is not relieved by elevation, schedule an appointment with  office, ER, or Urgent care to get cast adjusted.   3. Make it comfortable: Since the fiberglass on  your cast may fray during application, feel free to make minor modifications to your cast to reduce the amount of irritation to your skin. This is especially important around the thumb area of the cast. It may be helpful to use a nail file or small shop file to smooth sharp areas on the cast. You may also find it helpful to pad the base of the thumb with a band-aid or piece of tape. You may not cut large sections off of your cast.   ** For additional questions call [phone]   ** PATIENT VERBALIZED UNDERSTANDING OF ABOVE INSTRUCTIONS **
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
## 2021-02-12 ENCOUNTER — OFFICE VISIT (OUTPATIENT)
Dept: FAMILY MEDICINE | Facility: CLINIC | Age: 76
End: 2021-02-12
Payer: COMMERCIAL

## 2021-02-12 VITALS
RESPIRATION RATE: 18 BRPM | OXYGEN SATURATION: 98 % | WEIGHT: 169 LBS | HEART RATE: 108 BPM | DIASTOLIC BLOOD PRESSURE: 63 MMHG | TEMPERATURE: 97.6 F | SYSTOLIC BLOOD PRESSURE: 134 MMHG | HEIGHT: 75 IN | BODY MASS INDEX: 21.01 KG/M2

## 2021-02-12 DIAGNOSIS — S52.022A CLOSED FRACTURE OF OLECRANON PROCESS OF LEFT ULNA, INITIAL ENCOUNTER: ICD-10-CM

## 2021-02-12 DIAGNOSIS — E11.40 TYPE 2 DIABETES, CONTROLLED, WITH NEUROPATHY (H): ICD-10-CM

## 2021-02-12 DIAGNOSIS — N18.30 STAGE 3 CHRONIC KIDNEY DISEASE, UNSPECIFIED WHETHER STAGE 3A OR 3B CKD (H): ICD-10-CM

## 2021-02-12 DIAGNOSIS — Z01.818 PREOP GENERAL PHYSICAL EXAM: Primary | ICD-10-CM

## 2021-02-12 DIAGNOSIS — I70.235 ATHEROSCLEROSIS OF NATIVE ARTERIES OF RIGHT LEG WITH ULCERATION OF OTHER PART OF FOOT (H): ICD-10-CM

## 2021-02-12 DIAGNOSIS — I73.9 PAD (PERIPHERAL ARTERY DISEASE) (H): ICD-10-CM

## 2021-02-12 DIAGNOSIS — J43.2 CENTRILOBULAR EMPHYSEMA (H): ICD-10-CM

## 2021-02-12 PROCEDURE — 99214 OFFICE O/P EST MOD 30 MIN: CPT | Performed by: NURSE PRACTITIONER

## 2021-02-12 ASSESSMENT — MIFFLIN-ST. JEOR: SCORE: 1589.27

## 2021-02-12 NOTE — PATIENT INSTRUCTIONS
How to Take Your Medication Before Surgery   - aspirin: HOLD 5 days prior to surgery.    - clopidrogel (Plavix): HOLD 5 days before surgery.    - lisinopril: May be continued on the day of surgery.    - short acting insulin (lispro): HOLD on the morning of surgery.    - GLP-1 Injectable (dulaglutide): HOLD day of surgery     Instructions About Your Continuous Glucose Monitor  You should be prepared to remove the Continuous Glucose Monitor prior to the operation in order to avoid damage to the equipment during the procedure. Your Continuous Glucose Monitor will not be the source of glucose monitoring during the operation.    How to Manage Your Diabetes Before Surgery  If you use insulin for your diabetes, follow these steps to keep your blood sugar in a safe range before surgery, when you ve been told not to eat or drink:     Check your blood sugar every 4 hours     If your blood sugar is high, take a corrective dose (not a meal dose) of sliding-scale insulin, if that is what you re used to doing    If your blood sugar is below 100, or you have symptoms of hypoglycemia, follow these steps:   1) Have 1 item from this list:  - 4 oz (1/2 cup) of fruit juice without pulp    - 4 oz (1/2 cup) of regular soda (not diet soda)    - 3 glucose gels     - 5 sugar cubes or sugar packets   2) Check your blood sugar again after 15 minutes  3) Repeat steps 1 and 2 again until your blood sugar is greater than 100   Preparing for Your Surgery  Getting started  A nurse will call you to review your health history and instructions. They will give you an arrival time based on your scheduled surgery time.  Please be ready to share the following:  Your doctor's clinic name and phone number  Your medical, surgical and anesthesia history  A list of allergies and sensitivities  A list of medicines, including herbal treatments and over-the-counter drugs  Whether the patient has a legal guardian (ask how to send us the papers in advance)  If  you have a child who's having surgery, please ask for a copy of Preparing for Your Child's Surgery.    Preparing for surgery  Within 30 days of surgery: Have a pre-op exam (sometimes called an H&P, or History and Physical). This can be done at a clinic or pre-operative center.  If you're having a , you may not need this exam. Talk to your care team  At your pre-op exam, talk to your care team about all medicines you take. If you need to stop any medicines before surgery, ask when to start taking them again.  We do this for your safety. Many medicines can make you bleed too much during surgery. Some change how well surgery (anesthesia) drugs work.  Call your insurance company to let them know you're having surgery. (If you don't have insurance, call [phone].)  Call your clinic if there's any change in your health. This includes signs of a cold or flu (sore throat, runny nose, cough, rash, fever). It also includes a scrape or scratch near the surgery site.  If you have questions on the day of surgery, call your hospital or surgery center.  Eating and drinking guidelines  For your safety: Unless your surgeon tells you otherwise, follow the guidelines below.  Eat and drink as usual until 8 hours before surgery. After that, no food or milk.  Drink clear liquids until 2 hours before surgery. These are liquids you can see through, like water, Gatorade and Propel Water. You may also have black coffee and tea (no cream or milk).  Nothing by mouth within 2 hours of surgery. This includes gum, candy and breath mints.  If you drink, stop drinking alcohol the night before surgery.  If your care team tells you to take medicine on the morning of surgery, it's okay to take it with a sip of water.  Preventing infection  Shower or bathe the night before and morning of your surgery. Follow the instructions your clinic gave you. (If no instructions, use regular soap.)  Don't shave or clip hair near your surgery site.  We'll remove the hair if needed.  Don't smoke or vape the morning of surgery. You may chew nicotine gum up to 2 hours before surgery. A nicotine patch is okay.  Note: Some surgeries require you to completely quit smoking and nicotine. Check with your surgeon.  Your care team will make every effort to keep you safe from infection. We will:  Clean our hands often with soap and water (or an alcohol-based hand rub).  Clean the skin at your surgery site with a special soap that kills germs.  Give you a special gown to keep you warm. (Cold raises the risk of infection.)  Wear special hair covers, masks, gowns and gloves during surgery.  Give antibiotic medicine, if prescribed. Not all surgeries need antibiotics.  What to bring on the day of surgery  Photo ID and insurance card  Copy of your health care directive, if you have one  Glasses and hearing aides (bring cases)  You can't wear contacts during surgery  Inhaler and eye drops, if you use them (tell us about these when you arrive)  CPAP machine or breathing device, if you use them  A few personal items, if spending the night  If you have . . .  A pacemaker or ICD (cardiac defibrillator): Bring the ID card.  An implanted stimulator: Bring the remote control.  A legal guardian: Bring a copy of the certified (court-stamped) guardianship papers.  Please remove any jewelry, including body piercings. Leave jewelry and other valuables at home.  If you're going home the day of surgery  Important: If you don't follow the rules below, we must cancel your surgery.   Arrange for someone to drive you home after surgery. You may not drive, take a taxi or take public transportation by yourself (unless you'll have local anesthesia only).  Arrange for a responsible adult to stay with you overnight. If you don't, we may keep you in the hospital overnight, and you may need to pay the costs yourself.  Questions?   If you have any questions for your care team, list them here:  _________________________________________________________________________________________________________________________________________________________________________________________________________________________________________________________________________________________________________________________  For informational purposes only. Not to replace the advice of your health care provider. Copyright   2003, 2019 [facility]. All rights reserved. Clinically reviewed by Alisson Pisano MD. [employer] [id number] - REV 4/20.  4)
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
## 2021-02-13 DIAGNOSIS — Z11.59 ENCOUNTER FOR SCREENING FOR OTHER VIRAL DISEASES: ICD-10-CM

## 2021-02-13 LAB
SARS-COV-2 RNA RESP QL NAA+PROBE: NORMAL
SPECIMEN SOURCE: NORMAL

## 2021-02-13 PROCEDURE — U0005 INFEC AGEN DETEC AMPLI PROBE: HCPCS | Performed by: ORTHOPAEDIC SURGERY

## 2021-02-13 PROCEDURE — U0003 INFECTIOUS AGENT DETECTION BY NUCLEIC ACID (DNA OR RNA); SEVERE ACUTE RESPIRATORY SYNDROME CORONAVIRUS 2 (SARS-COV-2) (CORONAVIRUS DISEASE [COVID-19]), AMPLIFIED PROBE TECHNIQUE, MAKING USE OF HIGH THROUGHPUT TECHNOLOGIES AS DESCRIBED BY CMS-2020-01-R: HCPCS | Performed by: ORTHOPAEDIC SURGERY

## 2021-02-15 ENCOUNTER — TELEPHONE (OUTPATIENT)
Dept: ORTHOPEDICS | Facility: CLINIC | Age: 76
End: 2021-02-15

## 2021-02-15 DIAGNOSIS — I10 ESSENTIAL HYPERTENSION: ICD-10-CM

## 2021-02-15 DIAGNOSIS — I95.9 HYPOTENSION, UNSPECIFIED HYPOTENSION TYPE: Primary | ICD-10-CM

## 2021-02-15 NOTE — OR NURSING
Per PCP at Pre-op H&P, surgery is recommended to be performed in hospital based setting. Anesthesia is in agreement. Patient is aware.

## 2021-02-15 NOTE — TELEPHONE ENCOUNTER
Reason for Call:  Other call back    Detailed comments: Patient is scheduled for an ORIF on 02/17/2021 at [facility] patient left a voice message noting that his surgery was to [place] [facility] due to pre-existing conditions. Please call and advise Thank you     Phone Number Patient can be reached at: Home number on file [phone] (home)    Best Time: any    Can we leave a detailed message on this number? YES    Call taken on 2/15/2021 at 7:45 AM by Coreen Espinosa

## 2021-02-15 NOTE — TELEPHONE ENCOUNTER
Surgery location change to [facility] due to medical issues.   Surgery date is still 02/17/2021 at [facility]

## 2021-02-17 RX ORDER — LISINOPRIL 2.5 MG/1
7.5 TABLET ORAL DAILY
Qty: 270 TABLET | Refills: 3 | Status: SHIPPED | OUTPATIENT
Start: 2021-02-17 | End: 2021-05-10

## 2021-02-17 NOTE — TELEPHONE ENCOUNTER
"Last visit Dr Swift [facility] 2/5/2021: \"Essential hypertension  Will increase dose of lisinopril to 7.5 mg\"  "

## 2021-02-22 ENCOUNTER — ANCILLARY PROCEDURE (OUTPATIENT)
Dept: ULTRASOUND IMAGING | Facility: CLINIC | Age: 76
End: 2021-02-22
Attending: NURSE PRACTITIONER
Payer: COMMERCIAL

## 2021-02-22 DIAGNOSIS — I73.9 PAD (PERIPHERAL ARTERY DISEASE) (H): ICD-10-CM

## 2021-02-22 PROCEDURE — 93922 UPR/L XTREMITY ART 2 LEVELS: CPT | Mod: GC | Performed by: RADIOLOGY

## 2021-02-24 ENCOUNTER — OFFICE VISIT (OUTPATIENT)
Dept: PODIATRY | Facility: CLINIC | Age: 76
End: 2021-02-24
Payer: COMMERCIAL

## 2021-02-24 DIAGNOSIS — E11.49 TYPE II OR UNSPECIFIED TYPE DIABETES MELLITUS WITH NEUROLOGICAL MANIFESTATIONS, NOT STATED AS UNCONTROLLED(250.60) (H): Primary | ICD-10-CM

## 2021-02-24 DIAGNOSIS — L97.322 SKIN ULCER OF LEFT ANKLE WITH FAT LAYER EXPOSED (H): ICD-10-CM

## 2021-02-24 DIAGNOSIS — E11.51 DIABETES MELLITUS WITH PERIPHERAL VASCULAR DISEASE (H): ICD-10-CM

## 2021-02-24 DIAGNOSIS — I87.8 VENOUS STASIS: ICD-10-CM

## 2021-02-24 PROCEDURE — 99207 PR DROP WITH A PROCEDURE: CPT | Performed by: PODIATRIST

## 2021-02-24 PROCEDURE — 15271 SKIN SUB GRAFT TRNK/ARM/LEG: CPT | Performed by: PODIATRIST
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
## 2021-02-25 ENCOUNTER — VIRTUAL VISIT (OUTPATIENT)
Dept: VASCULAR SURGERY | Facility: CLINIC | Age: 76
End: 2021-02-25
Payer: COMMERCIAL

## 2021-02-25 DIAGNOSIS — Z48.89 ENCOUNTER FOR POSTOPERATIVE WOUND CHECK: Primary | ICD-10-CM

## 2021-02-25 DIAGNOSIS — I70.243 ATHEROSCLEROSIS OF NATIVE ARTERY OF LEFT LOWER EXTREMITY WITH ULCERATION OF ANKLE (H): ICD-10-CM

## 2021-02-25 PROCEDURE — 99024 POSTOP FOLLOW-UP VISIT: CPT | Performed by: SURGERY

## 2021-02-25 ASSESSMENT — PAIN SCALES - GENERAL: PAINLEVEL: MILD PAIN (3)

## 2021-02-25 NOTE — LETTER
2/25/2021       RE: Amos Walker  [address] [place] [address]     Dear Colleague,    Thank you for referring your patient, Amos Walker, to the [facility] [place] [facility]. Please see a copy of my visit note below.    This is a telephone visit as the patient was unable to connect by video    CC: follow up visit    HPI: This is a follow up visit for this 76 YO male who underwent left SFA and popliteal angioplasty via an open left femoral approach on 1/12. This was performed for a nonhealing ulceration on the left medial malleolus due to a hard boot. The patient has done well from the operation and reports that he left foot is warmer and has an improved appearance; the wound is being followed by Dr. Mcclain and appears improved. He states that there is a persistent lump in the inferior left groin incision, but he is unable to send images for evaluation. There is no history of bleeding, drainage or erythema.    He is still smoking 5 cigarettes/day and is ready to stop.    Of note, the patient fell one week after discharge and sustained a fracture of the right olecranon process. The fracture was recently plated.    I reviewed the photographs of the left ankle ulcer and note the presence of granulation tissue and decreased size. The ulcer is still deep and will require several weeks for healing    I reviewed the noninvasive test results:    Left CAROL .59 (increased from .46 preoperatively); TP 62 (increased from 28 preoperatively)    Assess:   1. Slowly healing left ankle ulceration s/p endovascular intervention  2. Continued smoking  3. Unable to evaluate left inguinal incision    Plan:  1. The patient will contact his PCP for smoking cessation instructions and Nicotine gum prescription  2. Continue follow up with Dr. Chappell for wound care  3. RTC in one week for face-to-face encounter to evaluate the left groin incision.    Augusto  VITO Maharaj MD

## 2021-02-25 NOTE — PROGRESS NOTES
This is a telephone visit as the patient was unable to connect by video    CC: follow up visit    HPI: This is a follow up visit for this 74 YO male who underwent left SFA and popliteal angioplasty via an open left femoral approach on 1/12. This was performed for a nonhealing ulceration on the left medial malleolus due to a hard boot. The patient has done well from the operation and reports that he left foot is warmer and has an improved appearance; the wound is being followed by Dr. Mcclain and appears improved. He states that there is a persistent lump in the inferior left groin incision, but he is unable to send images for evaluation. There is no history of bleeding, drainage or erythema.    He is still smoking 5 cigarettes/day and is ready to stop.    Of note, the patient fell one week after discharge and sustained a fracture of the right olecranon process. The fracture was recently plated.    I reviewed the photographs of the left ankle ulcer and note the presence of granulation tissue and decreased size. The ulcer is still deep and will require several weeks for healing    I reviewed the noninvasive test results:    Left CAROL .59 (increased from .46 preoperatively); TP 62 (increased from 28 preoperatively)    Assess:   1. Slowly healing left ankle ulceration s/p endovascular intervention  2. Continued smoking  3. Unable to evaluate left inguinal incision    Plan:  1. The patient will contact his PCP for smoking cessation instructions and Nicotine gum prescription  2. Continue follow up with Dr. Chappell for wound care  3. RTC in one week for face-to-face encounter to evaluate the left groin incision.    Augusto Maharaj MD

## 2021-02-25 NOTE — PATIENT INSTRUCTIONS
Preventive Care:    Colorectal Cancer Screening: During our visit today, we discussed that it is recommended you receive colorectal cancer screening. Please call or make an appointment with your primary care provider to discuss this. You may also call the [facility] scheduling line ([phone]) to set up a colonoscopy appointment.

## 2021-02-25 NOTE — NURSING NOTE
Vascular Rooming Note     Amos Walker's goals for this visit include:   Chief Complaint   Patient presents with     NAVARRO Trinidad, is participating in a virtual visit today for a 2 week post op, incision is healing well but noticed a lump, feel a week after surgery, as reported by patient.     Christiana Hill LPN

## 2021-03-01 ENCOUNTER — OFFICE VISIT (OUTPATIENT)
Dept: OPHTHALMOLOGY | Facility: CLINIC | Age: 76
End: 2021-03-01
Attending: OPHTHALMOLOGY
Payer: COMMERCIAL

## 2021-03-01 DIAGNOSIS — E11.3293 MILD NONPROLIFERATIVE DIABETIC RETINOPATHY OF BOTH EYES WITHOUT MACULAR EDEMA ASSOCIATED WITH TYPE 2 DIABETES MELLITUS (H): ICD-10-CM

## 2021-03-01 DIAGNOSIS — H35.363 DEGENERATIVE DRUSEN OF BOTH EYES: ICD-10-CM

## 2021-03-01 PROCEDURE — 92134 CPTRZ OPH DX IMG PST SGM RTA: CPT | Performed by: OPHTHALMOLOGY

## 2021-03-01 PROCEDURE — 92250 FUNDUS PHOTOGRAPHY W/I&R: CPT | Mod: 59 | Performed by: OPHTHALMOLOGY

## 2021-03-01 PROCEDURE — 99213 OFFICE O/P EST LOW 20 MIN: CPT | Performed by: OPHTHALMOLOGY

## 2021-03-01 PROCEDURE — G0463 HOSPITAL OUTPT CLINIC VISIT: HCPCS

## 2021-03-01 PROCEDURE — 99207 FUNDUS PHOTOS OU (BOTH EYES): CPT | Mod: 26 | Performed by: OPHTHALMOLOGY

## 2021-03-01 ASSESSMENT — SLIT LAMP EXAM - LIDS
COMMENTS: NORMAL
COMMENTS: NORMAL

## 2021-03-01 ASSESSMENT — VISUAL ACUITY
OD_SC: 20/20
METHOD: SNELLEN - LINEAR
OS_SC+: +1
OS_SC: 20/30

## 2021-03-01 ASSESSMENT — CONF VISUAL FIELD
OD_NORMAL: 1
METHOD: COUNTING FINGERS
OS_NORMAL: 1

## 2021-03-01 ASSESSMENT — TONOMETRY
OS_IOP_MMHG: 16
IOP_METHOD: TONOPEN
OD_IOP_MMHG: 16

## 2021-03-01 ASSESSMENT — CUP TO DISC RATIO
OS_RATIO: 0.35
OD_RATIO: 0.35

## 2021-03-01 ASSESSMENT — EXTERNAL EXAM - RIGHT EYE: OD_EXAM: NORMAL

## 2021-03-01 ASSESSMENT — EXTERNAL EXAM - LEFT EYE: OS_EXAM: NORMAL

## 2021-03-01 NOTE — NURSING NOTE
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     Lab Results       Component                Value               Date                       A1C                      6.0                 01/12/2021                 A1C                      5.8                 09/02/2020                 A1C                      5.8                 12/20/2019                 A1C                      5.6                 10/04/2019                 A1C                      6.7                 03/27/2019              Mabel Guzman, COT 1:09 PM  March 1, 2021

## 2021-03-01 NOTE — PROGRESS NOTES
I have confirmed the patient's Past Medical History, Past Surgical History, Social History, Family History, Problem List, Medication List and Technician note.    CC: follow up diabetic retinopathy    HPI: more and more difficulty reading,  Patient wonders about Cataract extraction    Amos Walker is a 75 year old male with the following ophthalmologic problems:    Here for diabetic retinopathy check. Notes blurry vision in the left eye. Blood sugars have been fairly well controlled. Following up with endocrinologist next week.  History of DM2 x 25yrs, on insulin, most recent HbA1c: 5.8-6.0 (12/2019)      ASSESSMENT/PLAN  1. DM very mild diabetic retinopathy both eyes  - previously NPDR  - OPTOS images and OXCT done today  - Blood pressure (<120/80) and blood glucose (HbA1c <7.0) control discussed with patient. Patient advised that failure to adequately control each may lead to vision loss. The patient expressed understanding.    2. Pseudophakia OU  - RD precautions    3. 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  Professor & Chair

## 2021-03-02 ENCOUNTER — MYC REFILL (OUTPATIENT)
Dept: INTERNAL MEDICINE | Facility: CLINIC | Age: 76
End: 2021-03-02

## 2021-03-02 DIAGNOSIS — E11.51 DIABETES MELLITUS WITH PERIPHERAL VASCULAR DISEASE (H): ICD-10-CM

## 2021-03-03 ENCOUNTER — OFFICE VISIT (OUTPATIENT)
Dept: PODIATRY | Facility: CLINIC | Age: 76
End: 2021-03-03
Payer: COMMERCIAL

## 2021-03-03 ENCOUNTER — ANCILLARY PROCEDURE (OUTPATIENT)
Dept: GENERAL RADIOLOGY | Facility: CLINIC | Age: 76
End: 2021-03-03
Attending: ORTHOPAEDIC SURGERY
Payer: COMMERCIAL

## 2021-03-03 ENCOUNTER — OFFICE VISIT (OUTPATIENT)
Dept: ORTHOPEDICS | Facility: CLINIC | Age: 76
End: 2021-03-03
Payer: COMMERCIAL

## 2021-03-03 VITALS
SYSTOLIC BLOOD PRESSURE: 128 MMHG | WEIGHT: 169 LBS | DIASTOLIC BLOOD PRESSURE: 53 MMHG | HEIGHT: 75 IN | BODY MASS INDEX: 21.01 KG/M2 | HEART RATE: 110 BPM

## 2021-03-03 VITALS — OXYGEN SATURATION: 100 % | HEART RATE: 110 BPM | SYSTOLIC BLOOD PRESSURE: 128 MMHG | DIASTOLIC BLOOD PRESSURE: 53 MMHG

## 2021-03-03 DIAGNOSIS — I87.8 VENOUS STASIS: ICD-10-CM

## 2021-03-03 DIAGNOSIS — S52.022D CLOSED FRACTURE OF OLECRANON PROCESS OF LEFT ULNA WITH ROUTINE HEALING, SUBSEQUENT ENCOUNTER: ICD-10-CM

## 2021-03-03 DIAGNOSIS — L97.322 SKIN ULCER OF LEFT ANKLE WITH FAT LAYER EXPOSED (H): ICD-10-CM

## 2021-03-03 DIAGNOSIS — S52.022D CLOSED FRACTURE OF OLECRANON PROCESS OF LEFT ULNA WITH ROUTINE HEALING, SUBSEQUENT ENCOUNTER: Primary | ICD-10-CM

## 2021-03-03 DIAGNOSIS — E11.51 DIABETES MELLITUS WITH PERIPHERAL VASCULAR DISEASE (H): ICD-10-CM

## 2021-03-03 DIAGNOSIS — Z09 POSTOP CHECK: ICD-10-CM

## 2021-03-03 DIAGNOSIS — E11.49 TYPE II OR UNSPECIFIED TYPE DIABETES MELLITUS WITH NEUROLOGICAL MANIFESTATIONS, NOT STATED AS UNCONTROLLED(250.60) (H): Primary | ICD-10-CM

## 2021-03-03 PROCEDURE — 99207 PR DROP WITH A PROCEDURE: CPT | Performed by: PODIATRIST

## 2021-03-03 PROCEDURE — 99024 POSTOP FOLLOW-UP VISIT: CPT | Performed by: ORTHOPAEDIC SURGERY

## 2021-03-03 PROCEDURE — 15271 SKIN SUB GRAFT TRNK/ARM/LEG: CPT | Performed by: PODIATRIST

## 2021-03-03 PROCEDURE — 73080 X-RAY EXAM OF ELBOW: CPT | Mod: LT | Performed by: RADIOLOGY

## 2021-03-03 RX ORDER — INSULIN LISPRO 100 [IU]/ML
INJECTION, SOLUTION INTRAVENOUS; SUBCUTANEOUS
Qty: 15 ML | Refills: 1 | Status: SHIPPED | OUTPATIENT
Start: 2021-03-03 | End: 2021-12-07

## 2021-03-03 ASSESSMENT — PAIN SCALES - GENERAL: PAINLEVEL: MODERATE PAIN (4)

## 2021-03-03 ASSESSMENT — MIFFLIN-ST. JEOR: SCORE: 1589.27
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
## 2021-03-03 NOTE — LETTER
"    3/3/2021         RE: Amos Walker  [address]  [place] [address]        Dear Colleague,    Thank you for referring your patient, Amos Walker, to the [facility]. Please see a copy of my visit note below.    SUBJECTIVE:   Amos Walker is here in follow up after his 2/17/21 ORIF of olecranon/proximal ulna fracture.    Symptoms: minimal pain. No numbness/tingling     Review of Systems:  Constitutional/General: Negative for fever, chills, change in weight  Integumentary/Skin: Negative for worrisome rashes, moles, or lesions  Neuro: Negative for weakness, dizziness, or paresthesias   Psychiatric: negative for changes in mood or affect    OBJECTIVE:  Physical Exam:  /53 (BP Location: Right arm, Patient Position: Sitting)   Pulse 110   Ht 1.892 m (6' 2.5\")   Wt 76.7 kg (169 lb)   BMI 21.41 kg/m    General Appearance: healthy, alert and no distress   Skin: no suspicious lesions or rashes  Neuro: Normal strength and tone, mentation intact and speech normal  Vascular: good pulses, and capillary refill   Lymph: no lymphadenopathy   Psych:  mentation appears normal and affect normal/bright  Resp: no increased work of breathing    Left Elbow Exam:  Inspection: wound looks excellent.  Healing well     Effusion: mild swelling   ROM:    Tender: non-tender    Strength: normal      X-RAY INTEPRETATION:  From today, Reviewed with the patient: hardware in good position. Fracture difficult to visualize on these views.    ASSESSMENT:   Doing well status post open reduction and internal fixation left olecranon/ proximal ulna fracture     PLAN:   Staples out, ACE wrap.  Sling  Out of sling for range of motion 4x/day  No lifting > 1-2 pounds  No pushing-off, no leaning on elbow.    Return to clinic: 4 weeks, with new xrays.    VITO Jacinto MD  Dept. Orthopedic Surgery  [facility]           Again, thank you for allowing me to participate in the care of your " patient.        Sincerely,        Junaid Jacinto MD

## 2021-03-03 NOTE — NURSING NOTE
Amos Walker's chief complaint for this visit includes:  Chief Complaint   Patient presents with     RECHECK     left medial ankle ulcer Apligraf     PCP: Racheal Swift    Referring Provider:  No referring provider defined for this encounter.    /53 (BP Location: Right arm, Patient Position: Sitting, Cuff Size: Adult Regular)   Pulse 110   SpO2 100%   Data Unavailable     Do you need any medication refills at today's visit? Yesenia Vidal CMA

## 2021-03-03 NOTE — TELEPHONE ENCOUNTER
insulin lispro (HUMALOG KWIKPEN) 100 UNIT/ML (1 unit dial) KWIKPEN      Last Written Prescription Date:  3-23-20  Last Fill Quantity: 15 ml,   # refills: 0  Last Office Visit : 2-5-21  Future Office visit:  5-3-21    Routing refill request to provider for review/approval because:  Gap in RF

## 2021-03-03 NOTE — PROGRESS NOTES
"SUBJECTIVE:   Amos Walker is here in follow up after his 2/17/21 ORIF of olecranon/proximal ulna fracture.    Symptoms: minimal pain. No numbness/tingling     Review of Systems:  Constitutional/General: Negative for fever, chills, change in weight  Integumentary/Skin: Negative for worrisome rashes, moles, or lesions  Neuro: Negative for weakness, dizziness, or paresthesias   Psychiatric: negative for changes in mood or affect    OBJECTIVE:  Physical Exam:  /53 (BP Location: Right arm, Patient Position: Sitting)   Pulse 110   Ht 1.892 m (6' 2.5\")   Wt 76.7 kg (169 lb)   BMI 21.41 kg/m    General Appearance: healthy, alert and no distress   Skin: no suspicious lesions or rashes  Neuro: Normal strength and tone, mentation intact and speech normal  Vascular: good pulses, and capillary refill   Lymph: no lymphadenopathy   Psych:  mentation appears normal and affect normal/bright  Resp: no increased work of breathing    Left Elbow Exam:  Inspection: wound looks excellent.  Healing well     Effusion: mild swelling   ROM:    Tender: non-tender    Strength: normal      X-RAY INTEPRETATION:  From today, Reviewed with the patient: hardware in good position. Fracture difficult to visualize on these views.    ASSESSMENT:   Doing well status post open reduction and internal fixation left olecranon/ proximal ulna fracture     PLAN:   Staples out, ACE wrap.  Sling  Out of sling for range of motion 4x/day  No lifting > 1-2 pounds  No pushing-off, no leaning on elbow.    Return to clinic: 4 weeks, with new xrays.    VITO Jacinto MD  Dept. Orthopedic Surgery  [facility]       "
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
## 2021-03-03 NOTE — PATIENT INSTRUCTIONS
Thanks for coming today.  Ortho[facility]Sports Medicine Clinic  [address] [place] [address]    To schedule future appointments in Ortho Clinic, you may call [phone].    To schedule ordered imaging by your provider:   Call [facility] [phone]    To schedule an injection ordered by your provider:  Call [facility] Injection scheduling line: [phone]  [facility] available online at:  [email or web address]    Please call if any further questions or concerns ([phone]).  Clinic hours 8 am to 5 pm.    Return to clinic (call) if symptoms worsen or fail to improve.

## 2021-03-04 ENCOUNTER — OFFICE VISIT (OUTPATIENT)
Dept: VASCULAR SURGERY | Facility: CLINIC | Age: 76
End: 2021-03-04
Payer: COMMERCIAL

## 2021-03-04 VITALS — HEART RATE: 77 BPM | SYSTOLIC BLOOD PRESSURE: 130 MMHG | OXYGEN SATURATION: 100 % | DIASTOLIC BLOOD PRESSURE: 68 MMHG

## 2021-03-04 DIAGNOSIS — I73.9 PAD (PERIPHERAL ARTERY DISEASE) (H): ICD-10-CM

## 2021-03-04 DIAGNOSIS — Z48.89 ENCOUNTER FOR POSTOPERATIVE WOUND CHECK: Primary | ICD-10-CM

## 2021-03-04 PROCEDURE — 99207 PR NO CHARGE LOS: CPT | Performed by: SURGERY

## 2021-03-04 ASSESSMENT — PAIN SCALES - GENERAL: PAINLEVEL: MILD PAIN (2)

## 2021-03-04 NOTE — LETTER
3/4/2021       RE: Amos Walker  [address] [place] [address]     Dear Colleague,    Thank you for referring your patient, Amos Walker, to the [facility] [place] at [facility]. Please see a copy of my visit note below.    F/U wound check of the left groin incision. When seen by video last week, the patient complained of a lump in the inferior incision. A small protruding suture was removed from the central wound. A firm subcutaneous area is palpable under the distal incision. No evidence of fluctuance, drainage or other problem. I confirmed that the patient's right toe pressure is 62 from the last visit, and the ankle ulcer appears to be healing slowly. The patient is being followed weekly by Dr. Mcclain. Plan RTC in 6 months with CAROL and TP on arrival.    I spent a total of 8 minutes with this visit including physical exam and counseling (5 minutes) and documentation (3 minutes).    Augusto Maharaj MD

## 2021-03-04 NOTE — PROGRESS NOTES
F/U wound check of the left groin incision. When seen by video last week, the patient complained of a lump in the inferior incision. A small protruding suture was removed from the central wound. A firm subcutaneous area is palpable under the distal incision. No evidence of fluctuance, drainage or other problem. I confirmed that the patient's right toe pressure is 62 from the last visit, and the ankle ulcer appears to be healing slowly. The patient is being followed weekly by Dr. Mcclain. Plan RTC in 6 months with CAROL and TP on arrival.    I spent a total of 8 minutes with this visit including physical exam and counseling (5 minutes) and documentation (3 minutes).    Augusto Maharaj MD

## 2021-03-04 NOTE — NURSING NOTE
Vascular Rooming Note     Amos Walker's goals for this visit include:   Chief Complaint   Patient presents with     RECHECK     Amos, is being seen today for a follow up left side groin wound check,  feeling fine, as reported by patient.     Christiana Hill LPN

## 2021-03-08 DIAGNOSIS — S52.022D CLOSED FRACTURE OF OLECRANON PROCESS OF LEFT ULNA WITH ROUTINE HEALING, SUBSEQUENT ENCOUNTER: Primary | ICD-10-CM

## 2021-03-08 DIAGNOSIS — I73.9 PAD (PERIPHERAL ARTERY DISEASE) (H): Primary | ICD-10-CM

## 2021-03-08 DIAGNOSIS — Z09 POSTOP CHECK: ICD-10-CM

## 2021-03-10 ENCOUNTER — OFFICE VISIT (OUTPATIENT)
Dept: PODIATRY | Facility: CLINIC | Age: 76
End: 2021-03-10
Payer: COMMERCIAL

## 2021-03-10 VITALS — SYSTOLIC BLOOD PRESSURE: 153 MMHG | DIASTOLIC BLOOD PRESSURE: 70 MMHG | HEART RATE: 81 BPM | OXYGEN SATURATION: 100 %

## 2021-03-10 DIAGNOSIS — L97.322 SKIN ULCER OF LEFT ANKLE WITH FAT LAYER EXPOSED (H): ICD-10-CM

## 2021-03-10 DIAGNOSIS — I87.8 VENOUS STASIS: ICD-10-CM

## 2021-03-10 DIAGNOSIS — E11.49 TYPE II OR UNSPECIFIED TYPE DIABETES MELLITUS WITH NEUROLOGICAL MANIFESTATIONS, NOT STATED AS UNCONTROLLED(250.60) (H): Primary | ICD-10-CM

## 2021-03-10 DIAGNOSIS — E11.51 DIABETES MELLITUS WITH PERIPHERAL VASCULAR DISEASE (H): ICD-10-CM

## 2021-03-10 PROCEDURE — 99214 OFFICE O/P EST MOD 30 MIN: CPT | Performed by: PODIATRIST
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
## 2021-03-10 NOTE — NURSING NOTE
Amos Walker's chief complaint for this visit includes:  Chief Complaint   Patient presents with     Left Ankle - Follow Up     PCP: Racheal Swift    Referring Provider:  No referring provider defined for this encounter.    BP (!) 153/70   Pulse 81   SpO2 100%   Data Unavailable     Do you need any medication refills at today's visit? No

## 2021-03-17 ENCOUNTER — OFFICE VISIT (OUTPATIENT)
Dept: PODIATRY | Facility: CLINIC | Age: 76
End: 2021-03-17
Payer: COMMERCIAL

## 2021-03-17 VITALS — HEART RATE: 91 BPM | OXYGEN SATURATION: 98 % | SYSTOLIC BLOOD PRESSURE: 153 MMHG | DIASTOLIC BLOOD PRESSURE: 62 MMHG

## 2021-03-17 DIAGNOSIS — L97.322 SKIN ULCER OF LEFT ANKLE WITH FAT LAYER EXPOSED (H): ICD-10-CM

## 2021-03-17 DIAGNOSIS — E11.49 TYPE II OR UNSPECIFIED TYPE DIABETES MELLITUS WITH NEUROLOGICAL MANIFESTATIONS, NOT STATED AS UNCONTROLLED(250.60) (H): Primary | ICD-10-CM

## 2021-03-17 DIAGNOSIS — E11.51 DIABETES MELLITUS WITH PERIPHERAL VASCULAR DISEASE (H): ICD-10-CM

## 2021-03-17 DIAGNOSIS — I87.8 VENOUS STASIS: ICD-10-CM

## 2021-03-17 PROCEDURE — 11042 DBRDMT SUBQ TIS 1ST 20SQCM/<: CPT | Performed by: PODIATRIST
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
## 2021-03-17 NOTE — NURSING NOTE
Amos Walker's chief complaint for this visit includes:  Chief Complaint   Patient presents with     RECHECK     left medial ankle ulcer     PCP: Racheal Swift    Referring Provider:  No referring provider defined for this encounter.    BP (!) 153/62 (BP Location: Right arm, Patient Position: Sitting, Cuff Size: Adult Regular)   Pulse 91   SpO2 98%   Data Unavailable     Do you need any medication refills at today's visit? No    Maya Vidal CMA

## 2021-03-19 ENCOUNTER — MYC MEDICAL ADVICE (OUTPATIENT)
Dept: PODIATRY | Facility: CLINIC | Age: 76
End: 2021-03-19

## 2021-03-22 NOTE — TELEPHONE ENCOUNTER
I've routed a message to the organo team at [email or web address] with the patient's inquiry

## 2021-03-24 ENCOUNTER — OFFICE VISIT (OUTPATIENT)
Dept: PODIATRY | Facility: CLINIC | Age: 76
End: 2021-03-24
Payer: COMMERCIAL

## 2021-03-24 VITALS — HEART RATE: 91 BPM | SYSTOLIC BLOOD PRESSURE: 140 MMHG | DIASTOLIC BLOOD PRESSURE: 61 MMHG | OXYGEN SATURATION: 100 %

## 2021-03-24 DIAGNOSIS — E11.49 TYPE II OR UNSPECIFIED TYPE DIABETES MELLITUS WITH NEUROLOGICAL MANIFESTATIONS, NOT STATED AS UNCONTROLLED(250.60) (H): Primary | ICD-10-CM

## 2021-03-24 DIAGNOSIS — I87.8 VENOUS STASIS: ICD-10-CM

## 2021-03-24 DIAGNOSIS — E11.51 DIABETES MELLITUS WITH PERIPHERAL VASCULAR DISEASE (H): ICD-10-CM

## 2021-03-24 DIAGNOSIS — L97.322 SKIN ULCER OF LEFT ANKLE WITH FAT LAYER EXPOSED (H): ICD-10-CM

## 2021-03-24 PROCEDURE — 99214 OFFICE O/P EST MOD 30 MIN: CPT | Performed by: PODIATRIST
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
## 2021-03-30 ENCOUNTER — OFFICE VISIT (OUTPATIENT)
Dept: ORTHOPEDICS | Facility: CLINIC | Age: 76
End: 2021-03-30
Payer: COMMERCIAL

## 2021-03-30 ENCOUNTER — ANCILLARY PROCEDURE (OUTPATIENT)
Dept: GENERAL RADIOLOGY | Facility: CLINIC | Age: 76
End: 2021-03-30
Attending: ORTHOPAEDIC SURGERY
Payer: COMMERCIAL

## 2021-03-30 VITALS
SYSTOLIC BLOOD PRESSURE: 117 MMHG | WEIGHT: 168 LBS | HEIGHT: 75 IN | OXYGEN SATURATION: 99 % | BODY MASS INDEX: 20.89 KG/M2 | DIASTOLIC BLOOD PRESSURE: 57 MMHG | HEART RATE: 98 BPM

## 2021-03-30 DIAGNOSIS — Z09 POSTOP CHECK: ICD-10-CM

## 2021-03-30 DIAGNOSIS — S52.022D CLOSED FRACTURE OF OLECRANON PROCESS OF LEFT ULNA WITH ROUTINE HEALING, SUBSEQUENT ENCOUNTER: ICD-10-CM

## 2021-03-30 DIAGNOSIS — I10 ESSENTIAL HYPERTENSION: ICD-10-CM

## 2021-03-30 DIAGNOSIS — S52.022D CLOSED FRACTURE OF OLECRANON PROCESS OF LEFT ULNA WITH ROUTINE HEALING, SUBSEQUENT ENCOUNTER: Primary | ICD-10-CM

## 2021-03-30 PROCEDURE — 73070 X-RAY EXAM OF ELBOW: CPT | Mod: LT | Performed by: RADIOLOGY

## 2021-03-30 PROCEDURE — 99024 POSTOP FOLLOW-UP VISIT: CPT | Performed by: ORTHOPAEDIC SURGERY

## 2021-03-30 ASSESSMENT — MIFFLIN-ST. JEOR: SCORE: 1584.73

## 2021-03-30 ASSESSMENT — PAIN SCALES - GENERAL: PAINLEVEL: NO PAIN (0)

## 2021-03-30 NOTE — LETTER
"    3/30/2021         RE: Amos Walker  [address] [address]        Dear Colleague,    Thank you for referring your patient, Amos Walker, to the [facility]. Please see a copy of my visit note below.    SUBJECTIVE:   Amos Walker is here in follow up after his 2/17/21 ORIF of olecranon/proximal ulna fracture.    Last visit:  Out of sling for range of motion 4x/day  No lifting > 1-2 pounds  No pushing-off, no leaning on elbow.    Symptoms: no pain.     Review of Systems:  Constitutional/General: Negative for fever, chills, change in weight  Integumentary/Skin: Negative for worrisome rashes, moles, or lesions  Neuro: Negative for weakness, dizziness, or paresthesias   Psychiatric: negative for changes in mood or affect    OBJECTIVE:  Physical Exam:  /57 (BP Location: Left arm, Patient Position: Sitting, Cuff Size: Adult Regular)   Pulse 98   Ht 1.892 m (6' 2.5\")   Wt 76.2 kg (168 lb)   SpO2 99%   BMI 21.28 kg/m    General Appearance: healthy, alert and no distress   Skin: no suspicious lesions or rashes  Neuro: Normal strength and tone, mentation intact and speech normal  Vascular: good pulses, and capillary refill   Lymph: no lymphadenopathy   Psych:  mentation appears normal and affect normal/bright  Resp: no increased work of breathing    Left Elbow Exam:  Inspection: wound looks excellent.  Healed  Dry skin     Effusion: mild swelling   ROM:    Tender: non-tender    Strength: normal      X-RAY INTEPRETATION:  From today, Reviewed with the patient:   hardware in good position. Fracture visualized, not healed.  No change in position of the fracture.    ASSESSMENT:   Doing well status post open reduction and internal fixation left olecranon/ proximal ulna fracture     PLAN:   Discontinue sling  Out of sling for range of motion 4x/day  No lifting > 10 pounds  Can do light pushing-off, no leaning on elbow.  We discussed that the nature of elbow " fractures and surgery often results in loss of complete motion.    Return to clinic: 6 weeks. Xrays if problems.    VITO Jacinto MD  Dept. Orthopedic Surgery  [facility]           Again, thank you for allowing me to participate in the care of your patient.        Sincerely,        Junaid Jacinto MD

## 2021-03-30 NOTE — PROGRESS NOTES
"SUBJECTIVE:   Amos Walker is here in follow up after his 2/17/21 ORIF of olecranon/proximal ulna fracture.    Last visit:  Out of sling for range of motion 4x/day  No lifting > 1-2 pounds  No pushing-off, no leaning on elbow.    Symptoms: no pain.     Review of Systems:  Constitutional/General: Negative for fever, chills, change in weight  Integumentary/Skin: Negative for worrisome rashes, moles, or lesions  Neuro: Negative for weakness, dizziness, or paresthesias   Psychiatric: negative for changes in mood or affect    OBJECTIVE:  Physical Exam:  /57 (BP Location: Left arm, Patient Position: Sitting, Cuff Size: Adult Regular)   Pulse 98   Ht 1.892 m (6' 2.5\")   Wt 76.2 kg (168 lb)   SpO2 99%   BMI 21.28 kg/m    General Appearance: healthy, alert and no distress   Skin: no suspicious lesions or rashes  Neuro: Normal strength and tone, mentation intact and speech normal  Vascular: good pulses, and capillary refill   Lymph: no lymphadenopathy   Psych:  mentation appears normal and affect normal/bright  Resp: no increased work of breathing    Left Elbow Exam:  Inspection: wound looks excellent.  Healed  Dry skin     Effusion: mild swelling   ROM:    Tender: non-tender    Strength: normal      X-RAY INTEPRETATION:  From today, Reviewed with the patient:   hardware in good position. Fracture visualized, not healed.  No change in position of the fracture.    ASSESSMENT:   Doing well status post open reduction and internal fixation left olecranon/ proximal ulna fracture     PLAN:   Discontinue sling  Out of sling for range of motion 4x/day  No lifting > 10 pounds  Can do light pushing-off, no leaning on elbow.  We discussed that the nature of elbow fractures and surgery often results in loss of complete motion.  physical therapy ordered.     Return to clinic: 6 weeks. Xrays if problems.    VITO Jacinto MD  Dept. Orthopedic Surgery  [facility]       "

## 2021-03-31 ENCOUNTER — OFFICE VISIT (OUTPATIENT)
Dept: PODIATRY | Facility: CLINIC | Age: 76
End: 2021-03-31
Payer: COMMERCIAL

## 2021-03-31 VITALS — SYSTOLIC BLOOD PRESSURE: 147 MMHG | HEART RATE: 100 BPM | DIASTOLIC BLOOD PRESSURE: 68 MMHG | OXYGEN SATURATION: 100 %

## 2021-03-31 DIAGNOSIS — L97.322 SKIN ULCER OF LEFT ANKLE WITH FAT LAYER EXPOSED (H): ICD-10-CM

## 2021-03-31 DIAGNOSIS — I87.8 VENOUS STASIS: ICD-10-CM

## 2021-03-31 DIAGNOSIS — E11.51 DIABETES MELLITUS WITH PERIPHERAL VASCULAR DISEASE (H): ICD-10-CM

## 2021-03-31 DIAGNOSIS — E11.49 TYPE II OR UNSPECIFIED TYPE DIABETES MELLITUS WITH NEUROLOGICAL MANIFESTATIONS, NOT STATED AS UNCONTROLLED(250.60) (H): Primary | ICD-10-CM

## 2021-03-31 PROCEDURE — 11042 DBRDMT SUBQ TIS 1ST 20SQCM/<: CPT | Performed by: PODIATRIST
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
## 2021-03-31 NOTE — PATIENT INSTRUCTIONS
Thanks for coming today.  Ortho[facility] Medicine Clinic  [address] [place] [address]    To schedule future appointments in Ortho Clinic, you may call [phone].    To schedule ordered imaging by your provider:   Call [facility] [phone]    To schedule an injection ordered by your provider:  Call [facility] Injection scheduling line: [phone]  [email or web address] available online at:  [email or web address]    Please call if any further questions or concerns ([phone]).  Clinic hours 8 am to 5 pm.    Return to clinic (call) if symptoms worsen or fail to improve.
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
## 2021-04-03 ENCOUNTER — HEALTH MAINTENANCE LETTER (OUTPATIENT)
Age: 76
End: 2021-04-03

## 2021-04-06 ENCOUNTER — OFFICE VISIT (OUTPATIENT)
Dept: PODIATRY | Facility: CLINIC | Age: 76
End: 2021-04-06
Payer: COMMERCIAL

## 2021-04-06 VITALS — HEART RATE: 88 BPM | OXYGEN SATURATION: 100 % | DIASTOLIC BLOOD PRESSURE: 64 MMHG | SYSTOLIC BLOOD PRESSURE: 160 MMHG

## 2021-04-06 DIAGNOSIS — E11.49 TYPE II OR UNSPECIFIED TYPE DIABETES MELLITUS WITH NEUROLOGICAL MANIFESTATIONS, NOT STATED AS UNCONTROLLED(250.60) (H): Primary | ICD-10-CM

## 2021-04-06 DIAGNOSIS — I87.8 VENOUS STASIS: ICD-10-CM

## 2021-04-06 DIAGNOSIS — L97.322 SKIN ULCER OF LEFT ANKLE WITH FAT LAYER EXPOSED (H): ICD-10-CM

## 2021-04-06 DIAGNOSIS — E11.51 DIABETES MELLITUS WITH PERIPHERAL VASCULAR DISEASE (H): ICD-10-CM

## 2021-04-06 DIAGNOSIS — E11.610 CHARCOT FOOT DUE TO DIABETES MELLITUS (H): ICD-10-CM

## 2021-04-06 PROCEDURE — 99214 OFFICE O/P EST MOD 30 MIN: CPT | Performed by: PODIATRIST

## 2021-04-06 NOTE — NURSING NOTE
Amos Walker's chief complaint for this visit includes:  Chief Complaint   Patient presents with     RECHECK     left medial ankle ulcer/UNNA boot right foot     PCP: Racheal Swift    Referring Provider:  No referring provider defined for this encounter.    BP (!) 160/64 (BP Location: Right arm, Patient Position: Sitting, Cuff Size: Adult Regular)   Pulse 88   SpO2 100%   Data Unavailable     Do you need any medication refills at today's visit? Yesenia Vidal CMA
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
## 2021-04-13 ENCOUNTER — OFFICE VISIT (OUTPATIENT)
Dept: PODIATRY | Facility: CLINIC | Age: 76
End: 2021-04-13
Payer: COMMERCIAL

## 2021-04-13 VITALS — DIASTOLIC BLOOD PRESSURE: 67 MMHG | OXYGEN SATURATION: 100 % | HEART RATE: 88 BPM | SYSTOLIC BLOOD PRESSURE: 143 MMHG

## 2021-04-13 DIAGNOSIS — E11.51 DIABETES MELLITUS WITH PERIPHERAL VASCULAR DISEASE (H): ICD-10-CM

## 2021-04-13 DIAGNOSIS — L97.322 SKIN ULCER OF LEFT ANKLE WITH FAT LAYER EXPOSED (H): ICD-10-CM

## 2021-04-13 DIAGNOSIS — E11.610 CHARCOT FOOT DUE TO DIABETES MELLITUS (H): ICD-10-CM

## 2021-04-13 DIAGNOSIS — I87.8 VENOUS STASIS: ICD-10-CM

## 2021-04-13 DIAGNOSIS — E11.49 TYPE II OR UNSPECIFIED TYPE DIABETES MELLITUS WITH NEUROLOGICAL MANIFESTATIONS, NOT STATED AS UNCONTROLLED(250.60) (H): Primary | ICD-10-CM

## 2021-04-13 PROCEDURE — 99214 OFFICE O/P EST MOD 30 MIN: CPT | Performed by: PODIATRIST
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
## 2021-04-13 NOTE — PATIENT INSTRUCTIONS
Thanks for coming today.  [facility]  [address] [place] [address]    To schedule future appointments in Ortho Clinic, you may call [phone].    To schedule ordered imaging by your provider:   Call [facility] [phone]    To schedule an injection ordered by your provider:  Call [facility] Injection scheduling line: [phone]  [email or web address] available online at:  [email or web address]    Please call if any further questions or concerns ([phone]).  Clinic hours 8 am to 5 pm.    Return to clinic (call) if symptoms worsen or fail to improve.

## 2021-04-13 NOTE — NURSING NOTE
Amos Walker's chief complaint for this visit includes:  Chief Complaint   Patient presents with     RECHECK     wound care left medial ankle ulcer     PCP: Racheal Swift    Referring Provider:  No referring provider defined for this encounter.    BP (!) 143/67 (BP Location: Right arm, Patient Position: Sitting, Cuff Size: Adult Regular)   Pulse 88   SpO2 100%   Data Unavailable     Do you need any medication refills at today's visit? Yesenia Vidal CMA

## 2021-04-15 ENCOUNTER — IMMUNIZATION (OUTPATIENT)
Dept: NURSING | Facility: CLINIC | Age: 76
End: 2021-04-15
Payer: COMMERCIAL

## 2021-04-15 PROCEDURE — 0001A PR COVID VAC PFIZER DIL RECON 30 MCG/0.3 ML IM: CPT

## 2021-04-15 PROCEDURE — 91300 PR COVID VAC PFIZER DIL RECON 30 MCG/0.3 ML IM: CPT

## 2021-04-19 ENCOUNTER — THERAPY VISIT (OUTPATIENT)
Dept: PHYSICAL THERAPY | Facility: CLINIC | Age: 76
End: 2021-04-19
Attending: ORTHOPAEDIC SURGERY
Payer: COMMERCIAL

## 2021-04-19 DIAGNOSIS — M25.522 LEFT ELBOW PAIN: ICD-10-CM

## 2021-04-19 DIAGNOSIS — Z98.890 H/O ELBOW SURGERY: ICD-10-CM

## 2021-04-19 DIAGNOSIS — S52.022D CLOSED FRACTURE OF OLECRANON PROCESS OF LEFT ULNA WITH ROUTINE HEALING, SUBSEQUENT ENCOUNTER: ICD-10-CM

## 2021-04-19 PROCEDURE — 97112 NEUROMUSCULAR REEDUCATION: CPT | Mod: GP

## 2021-04-19 PROCEDURE — 97161 PT EVAL LOW COMPLEX 20 MIN: CPT | Mod: GP

## 2021-04-19 PROCEDURE — 97110 THERAPEUTIC EXERCISES: CPT | Mod: GP

## 2021-04-19 NOTE — PROGRESS NOTES
Physical Therapy Initial Evaluation  Subjective:    Therapist Generated HPI Evaluation  Problem details: S/P  Closed fracture of olecranon process of left ulna with routine healing, subsequent encounter  2/11/2021.     PMH includes some foot problems (states he's lost some balance at times); has not exercised much in the past year. Slightly hearing impaired. .         Type of problem:  Left elbow.    This is a new condition.  Condition occurred with:  A fall.  Where condition occurred: in the community.  Patient reports pain:  Posterior.  Pain is described as aching and is intermittent.    Since onset symptoms are gradually improving.  Associated symptoms:  Loss of motion/stiffness and loss of strength. Symptoms are exacerbated by certain positions                                Objective:  System                   Shoulder Evaluation:  ROM:  AROM:    Flexion:  Left:  135        Abduction:  Left: 120       Internal Rotation:  Left:  40      External Rotation:  Left:  65                                       ROM:  AROM:    Hyperextension Elbow: Left:  0    Flexion Elbow:  Left:155     Extension Elbow:  Left: 35                        Strength:  Strength wnl elbow/wrist: L RTC grossly 4/5     Extension Elbow: Left: 4+/5  Pain:++                :  Dominance: Right   Left: 28kg      Right: 36kg                                      General     ROS    Assessment/Plan:    Patient is a 75 year old adult with left side elbow complaints.    Patient has the following significant findings with corresponding treatment plan.                Diagnosis 1:  Closed fracture of olecranon process of left ulna with routine healing, subsequent encounter 2/11/2021  Decreased ROM/flexibility - manual therapy and therapeutic exercise  Decreased strength - therapeutic exercise and therapeutic activities  Impaired muscle performance - neuro re-education  Decreased function - therapeutic activities    Therapy Evaluation Codes:        Previous and current functional limitations:  (See Goal Flow Sheet for this information)    Short term and Long term goals: (See Goal Flow Sheet for this information)     Communication ability:  Patient appears to be able to clearly communicate and understand verbal and written communication and follow directions correctly.  Treatment Explanation - The following has been discussed with the patient:   RX ordered/plan of care  Anticipated outcomes  Possible risks and side effects  This patient would benefit from PT intervention to resume normal activities.   Rehab potential is good.    Frequency:  1 X week, once daily  Duration:  for 6 weeks  Discharge Plan:  Achieve all LTG.  Independent in home treatment program.  Reach maximal therapeutic benefit.    Please refer to the daily flowsheet for treatment today, total treatment time and time spent performing 1:1 timed codes.

## 2021-04-20 ENCOUNTER — OFFICE VISIT (OUTPATIENT)
Dept: PODIATRY | Facility: CLINIC | Age: 76
End: 2021-04-20
Payer: COMMERCIAL

## 2021-04-20 VITALS — HEART RATE: 98 BPM | OXYGEN SATURATION: 100 % | SYSTOLIC BLOOD PRESSURE: 169 MMHG | DIASTOLIC BLOOD PRESSURE: 73 MMHG

## 2021-04-20 DIAGNOSIS — E11.610 CHARCOT FOOT DUE TO DIABETES MELLITUS (H): ICD-10-CM

## 2021-04-20 DIAGNOSIS — E11.49 TYPE II OR UNSPECIFIED TYPE DIABETES MELLITUS WITH NEUROLOGICAL MANIFESTATIONS, NOT STATED AS UNCONTROLLED(250.60) (H): Primary | ICD-10-CM

## 2021-04-20 DIAGNOSIS — E11.51 DIABETES MELLITUS WITH PERIPHERAL VASCULAR DISEASE (H): ICD-10-CM

## 2021-04-20 DIAGNOSIS — I87.8 VENOUS STASIS: ICD-10-CM

## 2021-04-20 DIAGNOSIS — L97.322 SKIN ULCER OF LEFT ANKLE WITH FAT LAYER EXPOSED (H): ICD-10-CM

## 2021-04-20 DIAGNOSIS — L97.911 ULCER OF RIGHT LOWER EXTREMITY, LIMITED TO BREAKDOWN OF SKIN (H): ICD-10-CM

## 2021-04-20 PROCEDURE — 99214 OFFICE O/P EST MOD 30 MIN: CPT | Performed by: PODIATRIST
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
## 2021-04-20 NOTE — NURSING NOTE
Amos Walker's chief complaint for this visit includes:  Chief Complaint   Patient presents with     RECHECK     wound care left medial ankle ulcer & right anterior ankle ulcer     PCP: Racheal Swift    Referring Provider:  No referring provider defined for this encounter.    BP (!) 169/73 (BP Location: Left arm, Patient Position: Sitting, Cuff Size: Adult Regular)   Pulse 98   SpO2 100%   Data Unavailable     Do you need any medication refills at today's visit? Yesenia Vidal CMA
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
## 2021-04-28 ENCOUNTER — OFFICE VISIT (OUTPATIENT)
Dept: PODIATRY | Facility: CLINIC | Age: 76
End: 2021-04-28
Payer: COMMERCIAL

## 2021-04-28 VITALS — HEART RATE: 95 BPM | SYSTOLIC BLOOD PRESSURE: 170 MMHG | OXYGEN SATURATION: 99 % | DIASTOLIC BLOOD PRESSURE: 70 MMHG

## 2021-04-28 DIAGNOSIS — L97.322 SKIN ULCER OF LEFT ANKLE WITH FAT LAYER EXPOSED (H): ICD-10-CM

## 2021-04-28 DIAGNOSIS — L97.911 ULCER OF RIGHT LOWER EXTREMITY, LIMITED TO BREAKDOWN OF SKIN (H): ICD-10-CM

## 2021-04-28 DIAGNOSIS — E11.51 DIABETES MELLITUS WITH PERIPHERAL VASCULAR DISEASE (H): ICD-10-CM

## 2021-04-28 DIAGNOSIS — E11.610 CHARCOT FOOT DUE TO DIABETES MELLITUS (H): ICD-10-CM

## 2021-04-28 DIAGNOSIS — E11.49 TYPE II OR UNSPECIFIED TYPE DIABETES MELLITUS WITH NEUROLOGICAL MANIFESTATIONS, NOT STATED AS UNCONTROLLED(250.60) (H): Primary | ICD-10-CM

## 2021-04-28 DIAGNOSIS — I87.8 VENOUS STASIS: ICD-10-CM

## 2021-04-28 PROCEDURE — 99214 OFFICE O/P EST MOD 30 MIN: CPT | Performed by: PODIATRIST
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
## 2021-04-28 NOTE — NURSING NOTE
Amos Walker's chief complaint for this visit includes:  Chief Complaint   Patient presents with     RECHECK     right anterior ankle and left medial ankle wound care     PCP: Racheal Swift    Referring Provider:  No referring provider defined for this encounter.    BP (!) 170/70 (BP Location: Right arm, Patient Position: Sitting, Cuff Size: Adult Regular)   Pulse 95   SpO2 99%   Data Unavailable     Do you need any medication refills at today's visit? Yesenia Vidal CMA

## 2021-04-28 NOTE — PATIENT INSTRUCTIONS
Thanks for coming today.  [facility]  [address] [place] [address]    To schedule future appointments in Ortho Clinic, you may call [phone].    To schedule ordered imaging by your provider:   Call [facility] [phone]    To schedule an injection ordered by your provider:  Call [facility] Injection scheduling line: [phone]  [facility] available online at:  [email or web address]    Please call if any further questions or concerns ([phone]).  Clinic hours 8 am to 5 pm.    Return to clinic (call) if symptoms worsen or fail to improve.'
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
## 2021-05-04 ENCOUNTER — THERAPY VISIT (OUTPATIENT)
Dept: PHYSICAL THERAPY | Facility: CLINIC | Age: 76
End: 2021-05-04
Payer: COMMERCIAL

## 2021-05-04 ENCOUNTER — MYC MEDICAL ADVICE (OUTPATIENT)
Dept: PODIATRY | Facility: CLINIC | Age: 76
End: 2021-05-04

## 2021-05-04 DIAGNOSIS — Z98.890 H/O ELBOW SURGERY: ICD-10-CM

## 2021-05-04 DIAGNOSIS — M25.522 LEFT ELBOW PAIN: ICD-10-CM

## 2021-05-04 PROCEDURE — 97110 THERAPEUTIC EXERCISES: CPT | Mod: GP | Performed by: PHYSICAL THERAPY ASSISTANT

## 2021-05-04 PROCEDURE — 97140 MANUAL THERAPY 1/> REGIONS: CPT | Mod: GP | Performed by: PHYSICAL THERAPY ASSISTANT

## 2021-05-06 ENCOUNTER — OFFICE VISIT (OUTPATIENT)
Dept: PODIATRY | Facility: CLINIC | Age: 76
End: 2021-05-06
Payer: COMMERCIAL

## 2021-05-06 ENCOUNTER — IMMUNIZATION (OUTPATIENT)
Dept: NURSING | Facility: CLINIC | Age: 76
End: 2021-05-06
Attending: INTERNAL MEDICINE
Payer: COMMERCIAL

## 2021-05-06 VITALS — DIASTOLIC BLOOD PRESSURE: 63 MMHG | SYSTOLIC BLOOD PRESSURE: 133 MMHG | OXYGEN SATURATION: 100 % | HEART RATE: 88 BPM

## 2021-05-06 DIAGNOSIS — L97.322 SKIN ULCER OF LEFT ANKLE WITH FAT LAYER EXPOSED (H): ICD-10-CM

## 2021-05-06 DIAGNOSIS — E11.49 TYPE II OR UNSPECIFIED TYPE DIABETES MELLITUS WITH NEUROLOGICAL MANIFESTATIONS, NOT STATED AS UNCONTROLLED(250.60) (H): Primary | ICD-10-CM

## 2021-05-06 DIAGNOSIS — L97.912 ULCER OF RIGHT LOWER EXTREMITY WITH FAT LAYER EXPOSED (H): ICD-10-CM

## 2021-05-06 DIAGNOSIS — I87.8 VENOUS STASIS: ICD-10-CM

## 2021-05-06 DIAGNOSIS — E11.610 CHARCOT FOOT DUE TO DIABETES MELLITUS (H): ICD-10-CM

## 2021-05-06 DIAGNOSIS — E11.51 DIABETES MELLITUS WITH PERIPHERAL VASCULAR DISEASE (H): ICD-10-CM

## 2021-05-06 PROCEDURE — 0002A PR COVID VAC PFIZER DIL RECON 30 MCG/0.3 ML IM: CPT

## 2021-05-06 PROCEDURE — 91300 PR COVID VAC PFIZER DIL RECON 30 MCG/0.3 ML IM: CPT

## 2021-05-06 PROCEDURE — 99214 OFFICE O/P EST MOD 30 MIN: CPT | Performed by: PODIATRIST
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
## 2021-05-06 NOTE — PATIENT INSTRUCTIONS
Thanks for coming today.  [facility]  [address] [place] [address]    To schedule future appointments in Ortho Clinic, you may call [phone].    To schedule ordered imaging by your provider:   Call [facility] [phone]    To schedule an injection ordered by your provider:  Call [facility] Injection scheduling line: [phone]  [employer] available online at:  [email or web address]    Please call if any further questions or concerns ([phone]).  Clinic hours 8 am to 5 pm.    Return to clinic (call) if symptoms worsen or fail to improve.

## 2021-05-06 NOTE — NURSING NOTE
Amos Walker's chief complaint for this visit includes:  Chief Complaint   Patient presents with     RECHECK     left medial ankle ulcer/ right anterior ankle ulcer     PCP: Racheal Swift    Referring Provider:  No referring provider defined for this encounter.    /63 (BP Location: Right arm, Patient Position: Sitting, Cuff Size: Adult Regular)   Pulse 88   SpO2 100%   Data Unavailable     Do you need any medication refills at today's visit? Yesenia Vidal CMA

## 2021-05-10 ENCOUNTER — OFFICE VISIT (OUTPATIENT)
Dept: INTERNAL MEDICINE | Facility: CLINIC | Age: 76
End: 2021-05-10
Payer: COMMERCIAL

## 2021-05-10 VITALS
WEIGHT: 168 LBS | BODY MASS INDEX: 21.28 KG/M2 | HEART RATE: 101 BPM | SYSTOLIC BLOOD PRESSURE: 131 MMHG | OXYGEN SATURATION: 100 % | DIASTOLIC BLOOD PRESSURE: 68 MMHG

## 2021-05-10 DIAGNOSIS — E11.610 CHARCOT FOOT DUE TO DIABETES MELLITUS (H): ICD-10-CM

## 2021-05-10 DIAGNOSIS — T07.XXXA MULTIPLE FRACTURES: ICD-10-CM

## 2021-05-10 DIAGNOSIS — R25.1 TREMOR: ICD-10-CM

## 2021-05-10 DIAGNOSIS — Z13.820 SCREENING FOR OSTEOPOROSIS: ICD-10-CM

## 2021-05-10 DIAGNOSIS — I10 ESSENTIAL HYPERTENSION: ICD-10-CM

## 2021-05-10 DIAGNOSIS — Z87.891 PERSONAL HISTORY OF TOBACCO USE: Primary | ICD-10-CM

## 2021-05-10 DIAGNOSIS — M81.6 LOCALIZED OSTEOPOROSIS (LEQUESNE): ICD-10-CM

## 2021-05-10 DIAGNOSIS — E11.51 TYPE 2 DIABETES MELLITUS WITH DIABETIC PERIPHERAL ANGIOPATHY WITHOUT GANGRENE, WITH LONG-TERM CURRENT USE OF INSULIN (H): ICD-10-CM

## 2021-05-10 DIAGNOSIS — Z79.4 TYPE 2 DIABETES MELLITUS WITH DIABETIC PERIPHERAL ANGIOPATHY WITHOUT GANGRENE, WITH LONG-TERM CURRENT USE OF INSULIN (H): ICD-10-CM

## 2021-05-10 PROCEDURE — G0296 VISIT TO DETERM LDCT ELIG: HCPCS | Performed by: INTERNAL MEDICINE

## 2021-05-10 PROCEDURE — 99214 OFFICE O/P EST MOD 30 MIN: CPT | Performed by: INTERNAL MEDICINE

## 2021-05-10 RX ORDER — FLASH GLUCOSE SENSOR
KIT MISCELLANEOUS
Qty: 6 EACH | Refills: 3 | Status: SHIPPED | OUTPATIENT
Start: 2021-05-10 | End: 2022-03-19

## 2021-05-10 RX ORDER — LISINOPRIL 2.5 MG/1
10 TABLET ORAL DAILY
Qty: 360 TABLET | Refills: 1 | Status: SHIPPED | OUTPATIENT
Start: 2021-05-10 | End: 2022-01-10

## 2021-05-10 NOTE — NURSING NOTE
Chief Complaint   Patient presents with     RECHECK     follow up       Rocio Pantoja MA, at 1:07 PM on 5/10/2021.

## 2021-05-10 NOTE — PATIENT INSTRUCTIONS
Lung Cancer Screening   Frequently Asked Questions  If you are at high-risk for lung cancer, getting screened with low-dose computed tomography (LDCT) every year can help save your life. This handout offers answers to some of the most common questions about lung cancer screening. If you have other questions, please call [phone] ([phone]).     What is it?  Lung cancer screening uses special X-ray technology to create an image of your lung tissue. The exam is quick and easy and takes less than 10 seconds. We don t give you any medicine or use any needles. You can eat before and after the exam. You don t need to change your clothes as long as the clothing on your chest doesn t contain metal. But, you do need to be able to hold your breath for at least 6 seconds during the exam.    What is the goal of lung cancer screening?  The goal of lung cancer screening is to save lives. Many times, lung cancer is not found until a person starts having physical symptoms. Lung cancer screening can help detect lung cancer in the earliest stages when it may be easier to treat.    Who should be screened for lung cancer?  We suggest lung cancer screening for anyone who is at high-risk for lung cancer. You are in the high-risk group if you:      are between the ages of 55 and 79, and    have smoked at least 1 pack of cigarettes a day for 30 or more years, and    still smoke or have quit within the past 15 years.    However, if you have a new cough or shortness of breath, you should talk to your doctor before being screened.    Some national lung health advocacy groups also recommend screening for people ages 50 to 79 who have smoked an average of 1 pack of cigarettes a day for 20 years. They must also have at least 1 other risk factor for lung cancer, not including exposure to secondhand smoke. Other risk factors are having had cancer in the past, emphysema, pulmonary fibrosis, COPD, a family history of lung cancer, or  exposure to certain materials such as arsenic, asbestos, beryllium, cadmium, chromium, diesel fumes, nickel, radon or silica. Your care team can help you know if you have one of these risk factors.     Why does it matter if I have symptoms?  Certain symptoms can be a sign that you have a condition in your lungs that should be checked and treated by your doctor. These symptoms include fever, chest pain, a new or changing cough, shortness of breath that you have never felt before, coughing up blood or unexplained weight loss. Having any of these symptoms can greatly affect the results of lung cancer screening.       Should all smokers get an LDCT lung cancer screening exam?  It depends. Lung cancer screening is for a very specific group of men and women who have a history of heavy smoking over a long period of time (see  Who should be screened for lung cancer  above).  I am in the high-risk group, but have been diagnosed with cancer in the past. Is LDCT lung cancer screening right for me?  In some cases, you should not have LDCT lung screening, such as when your doctor is already following your cancer with CT scan studies. Your doctor will help you decide if LDCT lung screening is right for you.  Do I need to have a screening exam every year?  Yes. If you are in the high-risk group described earlier, you should get an LDCT lung cancer screening exam every year until you are 79, or are no longer willing or able to undergo screening and possible procedures to diagnose and treat lung cancer.  How effective is LDCT at preventing death from lung cancer?  Studies have shown that LDCT lung cancer screening can lower the risk of death from lung cancer by 20 percent in people who are at high-risk.  What are the risks?  There are some risks and limitations of LDCT lung cancer screening. We want to make sure you understand the risks and benefits, so please let us know if you have any questions. Your doctor may want to talk with  you more about these risks.    Radiation exposure: As with any exam that uses radiation, there is a very small increased risk of cancer. The amount of radiation in LDCT is small--about the same amount a person would get from a mammogram. Your doctor orders the exam when he or she feels the potential benefits outweigh the risks.    False negatives: No test is perfect, including LDCT. It is possible that you may have a medical condition, including lung cancer, that is not found during your exam. This is called a false negative result.    False positives and more testing: LDCT very often finds something in the lung that could be cancer, but in fact is not. This is called a false positive result. False positive tests often cause anxiety. To make sure these findings are not cancer, you may need to have more tests. These tests will be done only if you give us permission. Sometimes patients need a treatment that can have side effects, such as a biopsy. For more information on false positives, see  What can I expect from the results?     Findings not related to lung cancer: Your LDCT exam also takes pictures of areas of your body next to your lungs. In a very small number of cases, the CT scan will show an abnormal finding in one of these areas, such as your kidneys, adrenal glands, liver or thyroid. This finding may not be serious, but you may need more tests. Your doctor can help you decide what other tests you may need, if any.  What can I expect from the results?  About 1 out of 4 LDCT exams will find something that may need more tests. Most of the time, these findings are lung nodules. Lung nodules are very small collections of tissue in the lung. These nodules are very common, and the vast majority--more than 97 percent--are not cancer (benign). Most are normal lymph nodes or small areas of scarring from past infections.  But, if a small lung nodule is found to be cancer, the cancer can be cured more than 90 percent  of the time. To know if the nodule is cancer, we may need to get more images before your next yearly screening exam. If the nodule has suspicious features (for example, it is large, has an odd shape or grows over time), we will refer you to a specialist for further testing.  Will my doctor also get the results?  Yes. Your doctor will get a copy of your results.  Is it okay to keep smoking now that there s a cancer screening exam?  No. Tobacco is one of the strongest cancer-causing agents. It causes not only lung cancer, but other cancers and cardiovascular (heart) diseases as well. The damage caused by smoking builds over time. This means that the longer you smoke, the higher your risk of disease. While it is never too late to quit, the sooner you quit, the better.  Where can I find help to quit smoking?  The best way to prevent lung cancer is to stop smoking. If you have already quit smoking, congratulations and keep it up! For help on quitting smoking, please call [employer] at 9-924-185-CUNV ([phone]) or the American [employer] Society at 1-815.986.7232 to find local resources near you.  One-on-one health coaching:  If you d prefer to work individually with a health care provider on tobacco cessation, we offer:      Medication Therapy Management:  Our specially trained pharmacists work closely with you and your doctor to help you quit smoking.  Call [phone] or [phone] (toll free).     Can Do: Health coaching offered by [facility].  [email or web address]      [facility] Medication Refill Request Information:  * Please contact your pharmacy regarding ANY request for medication refills.  ** [facility] Fax = [phone]  * Please allow 3 business days for routine medication refills.  * Please allow 5 business days for controlled substance medication refills.     [facility] Test Result notification information:  *You will be notified with in 7-10 days of your  appointment day regarding the results of your test.  If you are on MyChart you will be notified as soon as the provider has reviewed the results and signed off on them.    [facility]: [phone]     To schedule your DEXA scan and CT scan please call: [phone]
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
## 2021-05-11 ENCOUNTER — OFFICE VISIT (OUTPATIENT)
Dept: PODIATRY | Facility: CLINIC | Age: 76
End: 2021-05-11
Payer: COMMERCIAL

## 2021-05-11 ENCOUNTER — TELEPHONE (OUTPATIENT)
Dept: PODIATRY | Facility: CLINIC | Age: 76
End: 2021-05-11

## 2021-05-11 VITALS — DIASTOLIC BLOOD PRESSURE: 68 MMHG | SYSTOLIC BLOOD PRESSURE: 145 MMHG | HEART RATE: 84 BPM | OXYGEN SATURATION: 100 %

## 2021-05-11 DIAGNOSIS — I87.8 VENOUS STASIS: ICD-10-CM

## 2021-05-11 DIAGNOSIS — E11.51 DIABETES MELLITUS WITH PERIPHERAL VASCULAR DISEASE (H): ICD-10-CM

## 2021-05-11 DIAGNOSIS — E11.49 TYPE II OR UNSPECIFIED TYPE DIABETES MELLITUS WITH NEUROLOGICAL MANIFESTATIONS, NOT STATED AS UNCONTROLLED(250.60) (H): Primary | ICD-10-CM

## 2021-05-11 DIAGNOSIS — L97.322 SKIN ULCER OF LEFT ANKLE WITH FAT LAYER EXPOSED (H): ICD-10-CM

## 2021-05-11 DIAGNOSIS — L97.912 ULCER OF RIGHT LOWER EXTREMITY WITH FAT LAYER EXPOSED (H): ICD-10-CM

## 2021-05-11 DIAGNOSIS — E11.610 CHARCOT FOOT DUE TO DIABETES MELLITUS (H): ICD-10-CM

## 2021-05-11 PROCEDURE — 99214 OFFICE O/P EST MOD 30 MIN: CPT | Mod: 25 | Performed by: PODIATRIST

## 2021-05-11 PROCEDURE — 97597 DBRDMT OPN WND 1ST 20 CM/<: CPT | Performed by: PODIATRIST

## 2021-05-11 NOTE — TELEPHONE ENCOUNTER
5/11 Provided phone number [phone] to schedule  weekly appts coming up for at least 5-6 weeks.     Cindy sanders Procedure   Orthopedics, Podiatry, Sports Medicine, ENT/Eye Specialties  [facility]   [phone]
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
## 2021-05-11 NOTE — NURSING NOTE
Amos Walker's chief complaint for this visit includes:  Chief Complaint   Patient presents with     RECHECK     ulcer left medial ankle, ulcer right anterior ankle     PCP: Racheal Swift    Referring Provider:  No referring provider defined for this encounter.    BP (!) 145/68 (Cuff Size: Adult Regular)   Pulse 84   SpO2 100%   Data Unavailable     Do you need any medication refills at today's visit? Yesenia Vidal CMA

## 2021-05-11 NOTE — PATIENT INSTRUCTIONS
Thanks for coming today.  [facility]  [address] [place] [address]    To schedule future appointments in Ortho Clinic, you may call [phone].    To schedule ordered imaging by your provider:   Call [facility] [phone]    To schedule an injection ordered by your provider:  Call [facility] Injection scheduling line: [phone]  [facility] available online at:  [email or web address]    Please call if any further questions or concerns ([phone]).  Clinic hours 8 am to 5 pm.    Return to clinic (call) if symptoms worsen or fail to improve.

## 2021-05-14 NOTE — PROGRESS NOTES
"SUBJECTIVE:   Amos Walker is here in follow up after his 2/17/21 ORIF of olecranon/proximal ulna fracture.    Last visit:  Discontinue sling  Out of sling for range of motion 4x/day  No lifting > 10 pounds  Can do light pushing-off, no leaning on elbow.  We discussed that the nature of elbow fractures and surgery often results in loss of complete motion.    physical therapy ordered    Symptoms: pain with extension of the knee. The physical therapy has made the pain a bit worse 3/10, but physical therapy has helped the range of motion.      Review of Systems:  Constitutional/General: Negative for fever, chills, change in weight  Integumentary/Skin: Negative for worrisome rashes, moles, or lesions  Neuro: Negative for weakness, dizziness, or paresthesias   Psychiatric: negative for changes in mood or affect    OBJECTIVE:  Physical Exam:  /69 (BP Location: Right arm, Patient Position: Sitting, Cuff Size: Adult Regular)   Pulse 77   Ht 1.892 m (6' 2.5\")   Wt 76.2 kg (168 lb)   SpO2 98%   BMI 21.28 kg/m    General Appearance: healthy, alert and no distress   Skin: no suspicious lesions or rashes  Neuro: Normal strength and tone, mentation intact and speech normal  Vascular: good pulses, and capillary refill   Lymph: no lymphadenopathy   Psych:  mentation appears normal and affect normal/bright  Resp: no increased work of breathing    Left Elbow Exam:  Inspection: wound looks excellent.  Healed    Effusion: mild swelling   ROM:     Tender: non-tender    Strength: normal      ASSESSMENT:   Doing well status post open reduction and internal fixation left olecranon/ proximal ulna fracture     PLAN:   Work on range of motion  No restrictions  Would still avoid leaning on elbow, but that is optional .    Return to clinic: as needed       VITO Jacinto MD  Dept. Orthopedic Surgery  [facility]       "

## 2021-05-18 ENCOUNTER — THERAPY VISIT (OUTPATIENT)
Dept: PHYSICAL THERAPY | Facility: CLINIC | Age: 76
End: 2021-05-18
Payer: COMMERCIAL

## 2021-05-18 ENCOUNTER — OFFICE VISIT (OUTPATIENT)
Dept: PODIATRY | Facility: CLINIC | Age: 76
End: 2021-05-18
Payer: COMMERCIAL

## 2021-05-18 ENCOUNTER — TELEPHONE (OUTPATIENT)
Dept: PODIATRY | Facility: CLINIC | Age: 76
End: 2021-05-18

## 2021-05-18 VITALS — OXYGEN SATURATION: 99 % | SYSTOLIC BLOOD PRESSURE: 137 MMHG | HEART RATE: 91 BPM | DIASTOLIC BLOOD PRESSURE: 59 MMHG

## 2021-05-18 DIAGNOSIS — I87.8 VENOUS STASIS: ICD-10-CM

## 2021-05-18 DIAGNOSIS — L97.322 SKIN ULCER OF LEFT ANKLE WITH FAT LAYER EXPOSED (H): ICD-10-CM

## 2021-05-18 DIAGNOSIS — E11.51 DIABETES MELLITUS WITH PERIPHERAL VASCULAR DISEASE (H): ICD-10-CM

## 2021-05-18 DIAGNOSIS — E11.610 CHARCOT FOOT DUE TO DIABETES MELLITUS (H): ICD-10-CM

## 2021-05-18 DIAGNOSIS — E11.49 TYPE II OR UNSPECIFIED TYPE DIABETES MELLITUS WITH NEUROLOGICAL MANIFESTATIONS, NOT STATED AS UNCONTROLLED(250.60) (H): Primary | ICD-10-CM

## 2021-05-18 DIAGNOSIS — M25.522 LEFT ELBOW PAIN: ICD-10-CM

## 2021-05-18 DIAGNOSIS — L97.912 ULCER OF RIGHT LOWER EXTREMITY WITH FAT LAYER EXPOSED (H): ICD-10-CM

## 2021-05-18 DIAGNOSIS — Z98.890 H/O ELBOW SURGERY: ICD-10-CM

## 2021-05-18 PROCEDURE — 97597 DBRDMT OPN WND 1ST 20 CM/<: CPT | Performed by: PODIATRIST

## 2021-05-18 PROCEDURE — 97110 THERAPEUTIC EXERCISES: CPT | Performed by: PHYSICAL THERAPY ASSISTANT

## 2021-05-18 PROCEDURE — 99214 OFFICE O/P EST MOD 30 MIN: CPT | Mod: 25 | Performed by: PODIATRIST

## 2021-05-18 PROCEDURE — 97140 MANUAL THERAPY 1/> REGIONS: CPT | Performed by: PHYSICAL THERAPY ASSISTANT

## 2021-05-18 RX ORDER — CEFADROXIL 500 MG/1
500 CAPSULE ORAL 2 TIMES DAILY
Qty: 28 CAPSULE | Refills: 0 | Status: SHIPPED | OUTPATIENT
Start: 2021-05-18 | End: 2021-11-17

## 2021-05-18 NOTE — NURSING NOTE
Amos Walker's chief complaint for this visit includes:  Chief Complaint   Patient presents with     RECHECK     left medial ankle ulcer, right anterior ankle ulcer     PCP: Racheal Swift    Referring Provider:  No referring provider defined for this encounter.    /59   Pulse 91   SpO2 99%   Data Unavailable     Do you need any medication refills at today's visit? No    Maya Vidal CMA
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
## 2021-05-18 NOTE — TELEPHONE ENCOUNTER
Financial Counselor Review for Apligraf, Dermagraft, Puraply AM, Affinity, NuShield:    Which product(s) to be checked: Apligraf, Dermagraft, Puraply AM, Affinity, NuShield    Has the patient tried any of these products before: YES    Was it for this wound: NO    Diagnosis code (include ICD-10 code): L97.912, E11.51, E11.49    Coverage and patient financial responsibility information:YES    Does patient need to be contacted by Financial Counselor:YES    Note: Do not use abbreviations and route encounter to [facility] [place] [[id number]]

## 2021-05-19 ENCOUNTER — OFFICE VISIT (OUTPATIENT)
Dept: ORTHOPEDICS | Facility: CLINIC | Age: 76
End: 2021-05-19
Payer: COMMERCIAL

## 2021-05-19 VITALS
DIASTOLIC BLOOD PRESSURE: 69 MMHG | HEIGHT: 75 IN | WEIGHT: 168 LBS | OXYGEN SATURATION: 98 % | SYSTOLIC BLOOD PRESSURE: 126 MMHG | BODY MASS INDEX: 20.89 KG/M2 | HEART RATE: 77 BPM

## 2021-05-19 DIAGNOSIS — S52.022D CLOSED FRACTURE OF OLECRANON PROCESS OF LEFT ULNA WITH ROUTINE HEALING, SUBSEQUENT ENCOUNTER: Primary | ICD-10-CM

## 2021-05-19 PROCEDURE — 99024 POSTOP FOLLOW-UP VISIT: CPT | Performed by: ORTHOPAEDIC SURGERY

## 2021-05-19 ASSESSMENT — PAIN SCALES - GENERAL: PAINLEVEL: MILD PAIN (3)

## 2021-05-19 ASSESSMENT — MIFFLIN-ST. JEOR: SCORE: 1579.73

## 2021-05-19 NOTE — LETTER
"    5/19/2021         RE: Amos Walker  [address]  [place] [address]        Dear Colleague,    Thank you for referring your patient, Amos Walker, to the [facility]. Please see a copy of my visit note below.    SUBJECTIVE:   Amos Walker is here in follow up after his 2/17/21 ORIF of olecranon/proximal ulna fracture.    Last visit:  Discontinue sling  Out of sling for range of motion 4x/day  No lifting > 10 pounds  Can do light pushing-off, no leaning on elbow.  We discussed that the nature of elbow fractures and surgery often results in loss of complete motion.    physical therapy ordered    Symptoms: pain with extension of the knee. The physical therapy has made the pain a bit worse 3/10, but physical therapy has helped the range of motion.      Review of Systems:  Constitutional/General: Negative for fever, chills, change in weight  Integumentary/Skin: Negative for worrisome rashes, moles, or lesions  Neuro: Negative for weakness, dizziness, or paresthesias   Psychiatric: negative for changes in mood or affect    OBJECTIVE:  Physical Exam:  /69 (BP Location: Right arm, Patient Position: Sitting, Cuff Size: Adult Regular)   Pulse 77   Ht 1.892 m (6' 2.5\")   Wt 76.2 kg (168 lb)   SpO2 98%   BMI 21.28 kg/m    General Appearance: healthy, alert and no distress   Skin: no suspicious lesions or rashes  Neuro: Normal strength and tone, mentation intact and speech normal  Vascular: good pulses, and capillary refill   Lymph: no lymphadenopathy   Psych:  mentation appears normal and affect normal/bright  Resp: no increased work of breathing    Left Elbow Exam:  Inspection: wound looks excellent.  Healed    Effusion: mild swelling   ROM:     Tender: non-tender    Strength: normal      ASSESSMENT:   Doing well status post open reduction and internal fixation left olecranon/ proximal ulna fracture     PLAN:   Work on range of motion  No restrictions  Would still avoid " leaning on elbow, but that is optional .    Return to clinic: as needed       VITO Jacinto MD  Dept. Orthopedic Surgery  [facility]           Again, thank you for allowing me to participate in the care of your patient.        Sincerely,        Junaid Jacinto MD

## 2021-05-19 NOTE — TELEPHONE ENCOUNTER
Benefit investigation form and demographics facesheet faxed to [facility] for review. Pending

## 2021-05-21 NOTE — TELEPHONE ENCOUNTER
Do we know what their criteria for coverage is?  We check coverage for the left medical ankle wound which was denied originally and then submitted again and was covered.

## 2021-05-26 ENCOUNTER — OFFICE VISIT (OUTPATIENT)
Dept: ORTHOPEDICS | Facility: CLINIC | Age: 76
End: 2021-05-26
Payer: COMMERCIAL

## 2021-05-26 DIAGNOSIS — E11.610 CHARCOT FOOT DUE TO DIABETES MELLITUS (H): ICD-10-CM

## 2021-05-26 DIAGNOSIS — I87.8 VENOUS STASIS: ICD-10-CM

## 2021-05-26 DIAGNOSIS — L97.322 SKIN ULCER OF LEFT ANKLE WITH FAT LAYER EXPOSED (H): ICD-10-CM

## 2021-05-26 DIAGNOSIS — E11.51 DIABETES MELLITUS WITH PERIPHERAL VASCULAR DISEASE (H): Primary | ICD-10-CM

## 2021-05-26 DIAGNOSIS — E11.49 TYPE II OR UNSPECIFIED TYPE DIABETES MELLITUS WITH NEUROLOGICAL MANIFESTATIONS, NOT STATED AS UNCONTROLLED(250.60) (H): ICD-10-CM

## 2021-05-26 DIAGNOSIS — L97.912 SKIN ULCER OF RIGHT LOWER LEG WITH FAT LAYER EXPOSED (H): ICD-10-CM

## 2021-05-26 PROCEDURE — 99214 OFFICE O/P EST MOD 30 MIN: CPT | Performed by: PODIATRIST
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
## 2021-05-26 NOTE — NURSING NOTE
Wound measurement:  Right proximal 1 cm x 1 cm  Right distal 6.5 cm x 2.5 cm  Left medial ankle 0.5 cm x 0.5 cm
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
## 2021-06-01 ENCOUNTER — OFFICE VISIT (OUTPATIENT)
Dept: PODIATRY | Facility: CLINIC | Age: 76
End: 2021-06-01
Payer: COMMERCIAL

## 2021-06-01 VITALS — HEART RATE: 96 BPM | DIASTOLIC BLOOD PRESSURE: 66 MMHG | OXYGEN SATURATION: 100 % | SYSTOLIC BLOOD PRESSURE: 161 MMHG

## 2021-06-01 DIAGNOSIS — E11.51 DIABETES MELLITUS WITH PERIPHERAL VASCULAR DISEASE (H): ICD-10-CM

## 2021-06-01 DIAGNOSIS — L97.322 SKIN ULCER OF LEFT ANKLE WITH FAT LAYER EXPOSED (H): ICD-10-CM

## 2021-06-01 DIAGNOSIS — E11.49 TYPE II OR UNSPECIFIED TYPE DIABETES MELLITUS WITH NEUROLOGICAL MANIFESTATIONS, NOT STATED AS UNCONTROLLED(250.60) (H): Primary | ICD-10-CM

## 2021-06-01 DIAGNOSIS — L97.912 ULCER OF RIGHT LOWER EXTREMITY WITH FAT LAYER EXPOSED (H): ICD-10-CM

## 2021-06-01 DIAGNOSIS — E11.610 CHARCOT FOOT DUE TO DIABETES MELLITUS (H): ICD-10-CM

## 2021-06-01 DIAGNOSIS — I87.8 VENOUS STASIS: ICD-10-CM

## 2021-06-01 PROCEDURE — 99214 OFFICE O/P EST MOD 30 MIN: CPT | Performed by: PODIATRIST

## 2021-06-01 NOTE — NURSING NOTE
Amos Walker's chief complaint for this visit includes:  Chief Complaint   Patient presents with     RECHECK     left medial ankle ulcer, right anterior ankle ulcer     PCP: Racheal Swift    Referring Provider:  No referring provider defined for this encounter.    BP (!) 161/66 (BP Location: Right arm, Patient Position: Sitting, Cuff Size: Adult Regular)   Pulse 96   SpO2 100%   Data Unavailable     Do you need any medication refills at today's visit? Yes - Antibiotic if he should continue    Maya Vidal CMA

## 2021-06-01 NOTE — PATIENT INSTRUCTIONS
Thanks for coming today.  [facility]  [address] [place] [address]    To schedule future appointments in Ortho Clinic, you may call [phone].    To schedule ordered imaging by your provider:   Call Central Imaging [phone]    To schedule an injection ordered by your provider:  Call [facility] Injection scheduling line: [phone]  [facility] available online at:  [email or web address]    Please call if any further questions or concerns ([phone]).  Clinic hours 8 am to 5 pm.    Return to clinic (call) if symptoms worsen or fail to improve.
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
## 2021-06-07 ENCOUNTER — OFFICE VISIT (OUTPATIENT)
Dept: PODIATRY | Facility: CLINIC | Age: 76
End: 2021-06-07
Payer: COMMERCIAL

## 2021-06-07 VITALS — HEART RATE: 84 BPM | DIASTOLIC BLOOD PRESSURE: 70 MMHG | OXYGEN SATURATION: 98 % | SYSTOLIC BLOOD PRESSURE: 155 MMHG

## 2021-06-07 DIAGNOSIS — E11.610 CHARCOT FOOT DUE TO DIABETES MELLITUS (H): ICD-10-CM

## 2021-06-07 DIAGNOSIS — L97.322 SKIN ULCER OF LEFT ANKLE WITH FAT LAYER EXPOSED (H): ICD-10-CM

## 2021-06-07 DIAGNOSIS — I87.8 VENOUS STASIS: ICD-10-CM

## 2021-06-07 DIAGNOSIS — E11.51 DIABETES MELLITUS WITH PERIPHERAL VASCULAR DISEASE (H): ICD-10-CM

## 2021-06-07 DIAGNOSIS — L97.912 ULCER OF RIGHT LOWER EXTREMITY WITH FAT LAYER EXPOSED (H): ICD-10-CM

## 2021-06-07 DIAGNOSIS — E11.49 TYPE II OR UNSPECIFIED TYPE DIABETES MELLITUS WITH NEUROLOGICAL MANIFESTATIONS, NOT STATED AS UNCONTROLLED(250.60) (H): Primary | ICD-10-CM

## 2021-06-07 PROCEDURE — 99214 OFFICE O/P EST MOD 30 MIN: CPT | Performed by: PODIATRIST
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
## 2021-06-07 NOTE — NURSING NOTE
Amos Walker's chief complaint for this visit includes:  Chief Complaint   Patient presents with     RECHECK     left medial ankle ulcer/right anterior ankle ulcer     PCP: Racheal Swift    Referring Provider:  No referring provider defined for this encounter.    BP (!) 155/70 (BP Location: Right arm, Patient Position: Sitting, Cuff Size: Adult Regular)   Pulse 84   SpO2 98%   Data Unavailable     Do you need any medication refills at today's visit? Yes - if antibiotic therapy should continue    Maya Vidal CMA

## 2021-06-07 NOTE — PATIENT INSTRUCTIONS
Thanks for coming today.  [facility]  [address] [place] [address]    To schedule future appointments in Ortho Clinic, you may call [phone].    To schedule ordered imaging by your provider:   Call [facility] [phone]    To schedule an injection ordered by your provider:  Call Central [facility] Injection scheduling line: [phone]  "[email or web address]hart available online at:  [email or web address]    Please call if any further questions or concerns ([phone]).  Clinic hours 8 am to 5 pm.    Return to clinic (call) if symptoms worsen or fail to improve.

## 2021-06-09 NOTE — TELEPHONE ENCOUNTER
Called and let Danielle know that the bills were closed and paid. As of right now there is no outstanding bills for [facility]. Danielle was glad to hear that and will wait if anything else comes.  Ashley Ellison RN

## 2021-06-16 ENCOUNTER — OFFICE VISIT (OUTPATIENT)
Dept: PODIATRY | Facility: CLINIC | Age: 76
End: 2021-06-16
Payer: COMMERCIAL

## 2021-06-16 VITALS — HEART RATE: 90 BPM | SYSTOLIC BLOOD PRESSURE: 139 MMHG | OXYGEN SATURATION: 98 % | DIASTOLIC BLOOD PRESSURE: 61 MMHG

## 2021-06-16 DIAGNOSIS — L97.912 ULCER OF RIGHT LOWER EXTREMITY WITH FAT LAYER EXPOSED (H): ICD-10-CM

## 2021-06-16 DIAGNOSIS — L97.322 SKIN ULCER OF LEFT ANKLE WITH FAT LAYER EXPOSED (H): ICD-10-CM

## 2021-06-16 DIAGNOSIS — E11.610 CHARCOT FOOT DUE TO DIABETES MELLITUS (H): ICD-10-CM

## 2021-06-16 DIAGNOSIS — I87.8 VENOUS STASIS: ICD-10-CM

## 2021-06-16 DIAGNOSIS — E11.51 DIABETES MELLITUS WITH PERIPHERAL VASCULAR DISEASE (H): ICD-10-CM

## 2021-06-16 DIAGNOSIS — E11.49 TYPE II OR UNSPECIFIED TYPE DIABETES MELLITUS WITH NEUROLOGICAL MANIFESTATIONS, NOT STATED AS UNCONTROLLED(250.60) (H): Primary | ICD-10-CM

## 2021-06-16 PROCEDURE — 99214 OFFICE O/P EST MOD 30 MIN: CPT | Performed by: PODIATRIST
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
## 2021-06-16 NOTE — TELEPHONE ENCOUNTER
After speaking with the [employer] repTang, he recommended that we resubmit this for approval.  Please submit with the information below.

## 2021-06-23 NOTE — TELEPHONE ENCOUNTER
Does this patient qualify for these icd10 codes like he did back in February? Per harry we should try these codes again:

## 2021-06-25 NOTE — TELEPHONE ENCOUNTER
Dr. Mcclain would like to wait on ordering this and will discuss with patient.  Ashley Ellison RN

## 2021-07-05 ENCOUNTER — ANCILLARY PROCEDURE (OUTPATIENT)
Dept: BONE DENSITY | Facility: CLINIC | Age: 76
End: 2021-07-05
Attending: INTERNAL MEDICINE
Payer: COMMERCIAL

## 2021-07-05 ENCOUNTER — ANCILLARY PROCEDURE (OUTPATIENT)
Dept: CT IMAGING | Facility: CLINIC | Age: 76
End: 2021-07-05
Attending: INTERNAL MEDICINE
Payer: COMMERCIAL

## 2021-07-05 ENCOUNTER — OFFICE VISIT (OUTPATIENT)
Dept: PODIATRY | Facility: CLINIC | Age: 76
End: 2021-07-05
Payer: COMMERCIAL

## 2021-07-05 VITALS — HEART RATE: 83 BPM | SYSTOLIC BLOOD PRESSURE: 153 MMHG | DIASTOLIC BLOOD PRESSURE: 71 MMHG | OXYGEN SATURATION: 98 %

## 2021-07-05 DIAGNOSIS — Z87.891 PERSONAL HISTORY OF TOBACCO USE: ICD-10-CM

## 2021-07-05 DIAGNOSIS — T07.XXXA MULTIPLE FRACTURES: ICD-10-CM

## 2021-07-05 DIAGNOSIS — L97.912 ULCER OF RIGHT LOWER EXTREMITY WITH FAT LAYER EXPOSED (H): ICD-10-CM

## 2021-07-05 DIAGNOSIS — M81.6 LOCALIZED OSTEOPOROSIS (LEQUESNE): ICD-10-CM

## 2021-07-05 DIAGNOSIS — E11.49 TYPE II OR UNSPECIFIED TYPE DIABETES MELLITUS WITH NEUROLOGICAL MANIFESTATIONS, NOT STATED AS UNCONTROLLED(250.60) (H): Primary | ICD-10-CM

## 2021-07-05 DIAGNOSIS — M81.8 OTHER OSTEOPOROSIS WITHOUT CURRENT PATHOLOGICAL FRACTURE: ICD-10-CM

## 2021-07-05 DIAGNOSIS — I87.8 VENOUS STASIS: ICD-10-CM

## 2021-07-05 DIAGNOSIS — Z13.820 SCREENING FOR OSTEOPOROSIS: ICD-10-CM

## 2021-07-05 DIAGNOSIS — L97.322 SKIN ULCER OF LEFT ANKLE WITH FAT LAYER EXPOSED (H): ICD-10-CM

## 2021-07-05 DIAGNOSIS — E11.51 DIABETES MELLITUS WITH PERIPHERAL VASCULAR DISEASE (H): ICD-10-CM

## 2021-07-05 DIAGNOSIS — E11.610 CHARCOT FOOT DUE TO DIABETES MELLITUS (H): ICD-10-CM

## 2021-07-05 PROCEDURE — 77081 DXA BONE DENSITY APPENDICULR: CPT | Mod: 59 | Performed by: RADIOLOGY

## 2021-07-05 PROCEDURE — 77080 DXA BONE DENSITY AXIAL: CPT | Performed by: RADIOLOGY

## 2021-07-05 PROCEDURE — 71271 CT THORAX LUNG CANCER SCR C-: CPT | Mod: GC | Performed by: RADIOLOGY

## 2021-07-05 PROCEDURE — 99214 OFFICE O/P EST MOD 30 MIN: CPT | Performed by: PODIATRIST

## 2021-07-05 NOTE — PATIENT INSTRUCTIONS
Thanks for coming today.  [facility]  [address] [place] [address]    To schedule future appointments in Ortho Clinic, you may call [phone].    To schedule ordered imaging by your provider:   Call [facility] [phone]    To schedule an injection ordered by your provider:  Call [facility] Injection scheduling line: [phone]  [employer] available online at:  [email or web address]    Please call if any further questions or concerns ([phone]).  Clinic hours 8 am to 5 pm.    Return to clinic (call) if symptoms worsen or fail to improve.

## 2021-07-05 NOTE — NURSING NOTE
Amos Walker's chief complaint for this visit includes:  Chief Complaint   Patient presents with     RECHECK     ulcer left medial ankle & ulcer right anterior ankle     PCP: Racheal Swift    Referring Provider:  No referring provider defined for this encounter.    BP (!) 153/71   Pulse 83   SpO2 98%   Data Unavailable     Do you need any medication refills at today's visit? No    Maya Vidal CMA
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
## 2021-07-07 PROBLEM — Z98.890 H/O ELBOW SURGERY: Status: RESOLVED | Noted: 2021-04-19 | Resolved: 2021-07-07

## 2021-07-07 PROBLEM — M25.522 LEFT ELBOW PAIN: Status: RESOLVED | Noted: 2021-04-19 | Resolved: 2021-07-07

## 2021-07-13 ENCOUNTER — OFFICE VISIT (OUTPATIENT)
Dept: PODIATRY | Facility: CLINIC | Age: 76
End: 2021-07-13
Payer: COMMERCIAL

## 2021-07-13 VITALS — HEART RATE: 81 BPM | OXYGEN SATURATION: 99 % | DIASTOLIC BLOOD PRESSURE: 71 MMHG | SYSTOLIC BLOOD PRESSURE: 156 MMHG

## 2021-07-13 DIAGNOSIS — E11.51 DIABETES MELLITUS WITH PERIPHERAL VASCULAR DISEASE (H): ICD-10-CM

## 2021-07-13 DIAGNOSIS — L97.912 ULCER OF RIGHT LOWER EXTREMITY WITH FAT LAYER EXPOSED (H): ICD-10-CM

## 2021-07-13 DIAGNOSIS — I87.8 VENOUS STASIS: ICD-10-CM

## 2021-07-13 DIAGNOSIS — L84 TYLOMA: ICD-10-CM

## 2021-07-13 DIAGNOSIS — L97.322 SKIN ULCER OF LEFT ANKLE WITH FAT LAYER EXPOSED (H): ICD-10-CM

## 2021-07-13 DIAGNOSIS — E11.610 CHARCOT FOOT DUE TO DIABETES MELLITUS (H): ICD-10-CM

## 2021-07-13 DIAGNOSIS — E11.49 TYPE II OR UNSPECIFIED TYPE DIABETES MELLITUS WITH NEUROLOGICAL MANIFESTATIONS, NOT STATED AS UNCONTROLLED(250.60) (H): Primary | ICD-10-CM

## 2021-07-13 PROCEDURE — 99214 OFFICE O/P EST MOD 30 MIN: CPT | Performed by: PODIATRIST
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
## 2021-07-13 NOTE — NURSING NOTE
Amos Walker's chief complaint for this visit includes:  Chief Complaint   Patient presents with     Follow Up     Bilateral ankle ulcers     PCP: Racheal Swift    Referring Provider:  No referring provider defined for this encounter.    BP (!) 156/71 (BP Location: Left arm, Patient Position: Sitting, Cuff Size: Adult Large)   Pulse 81   SpO2 99%   Data Unavailable     Do you need any medication refills at today's visit? No    Paola Laws MA, ATC

## 2021-07-15 DIAGNOSIS — E11.40 TYPE 2 DIABETES, CONTROLLED, WITH NEUROPATHY (H): ICD-10-CM

## 2021-07-16 RX ORDER — DULAGLUTIDE 1.5 MG/.5ML
1.5 INJECTION, SOLUTION SUBCUTANEOUS
Qty: 6 ML | Refills: 1 | Status: SHIPPED | OUTPATIENT
Start: 2021-07-16 | End: 2022-01-04

## 2021-07-16 NOTE — TELEPHONE ENCOUNTER
dulaglutide (TRULICITY) 1.5 MG/0.5ML pen   Last Written Prescription Date:  10/29/2020  Last Fill Quantity: 6,   # refills: 1  Last Office Visit : 5/10/2021  Future Office visit:  8/16/2021    Routing refill request to provider for review/approval because:  Abnormal A1C      Refer to clinic for review     Recent Labs   Lab Test 01/12/21  1720 09/28/15  0000   A1C 6.0*  --    HEMOGLOBINA1  --  6.5*       Celeste Arceo RN  Central Triage Red Flags/Med Refills

## 2021-07-20 ENCOUNTER — OFFICE VISIT (OUTPATIENT)
Dept: PODIATRY | Facility: CLINIC | Age: 76
End: 2021-07-20
Payer: COMMERCIAL

## 2021-07-20 VITALS — OXYGEN SATURATION: 98 % | HEART RATE: 91 BPM | SYSTOLIC BLOOD PRESSURE: 158 MMHG | DIASTOLIC BLOOD PRESSURE: 67 MMHG

## 2021-07-20 DIAGNOSIS — E11.49 TYPE II OR UNSPECIFIED TYPE DIABETES MELLITUS WITH NEUROLOGICAL MANIFESTATIONS, NOT STATED AS UNCONTROLLED(250.60) (H): Primary | ICD-10-CM

## 2021-07-20 DIAGNOSIS — E11.610 CHARCOT FOOT DUE TO DIABETES MELLITUS (H): ICD-10-CM

## 2021-07-20 DIAGNOSIS — L97.912 ULCER OF RIGHT LOWER EXTREMITY WITH FAT LAYER EXPOSED (H): ICD-10-CM

## 2021-07-20 DIAGNOSIS — I87.8 VENOUS STASIS: ICD-10-CM

## 2021-07-20 DIAGNOSIS — E11.51 DIABETES MELLITUS WITH PERIPHERAL VASCULAR DISEASE (H): ICD-10-CM

## 2021-07-20 DIAGNOSIS — L97.322 SKIN ULCER OF LEFT ANKLE WITH FAT LAYER EXPOSED (H): ICD-10-CM

## 2021-07-20 PROCEDURE — 99214 OFFICE O/P EST MOD 30 MIN: CPT | Performed by: PODIATRIST
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
## 2021-07-20 NOTE — NURSING NOTE
Amos Walker's chief complaint for this visit includes:  Chief Complaint   Patient presents with     RECHECK     right anterior ankle ulcer, left medial ankle ulcer     PCP: Racheal Swift    Referring Provider:  No referring provider defined for this encounter.    BP (!) 158/67 (BP Location: Left arm, Patient Position: Sitting, Cuff Size: Adult Large)   Pulse 91   SpO2 98%   Data Unavailable     Do you need any medication refills at today's visit? Yesenia Vidal CMA

## 2021-07-21 ENCOUNTER — MYC MEDICAL ADVICE (OUTPATIENT)
Dept: PODIATRY | Facility: CLINIC | Age: 76
End: 2021-07-21

## 2021-07-24 ENCOUNTER — HEALTH MAINTENANCE LETTER (OUTPATIENT)
Age: 76
End: 2021-07-24

## 2021-07-29 ENCOUNTER — OFFICE VISIT (OUTPATIENT)
Dept: PODIATRY | Facility: CLINIC | Age: 76
End: 2021-07-29
Payer: COMMERCIAL

## 2021-07-29 VITALS — HEART RATE: 43 BPM | DIASTOLIC BLOOD PRESSURE: 59 MMHG | SYSTOLIC BLOOD PRESSURE: 148 MMHG | OXYGEN SATURATION: 99 %

## 2021-07-29 DIAGNOSIS — E11.49 TYPE II OR UNSPECIFIED TYPE DIABETES MELLITUS WITH NEUROLOGICAL MANIFESTATIONS, NOT STATED AS UNCONTROLLED(250.60) (H): Primary | ICD-10-CM

## 2021-07-29 DIAGNOSIS — I87.8 VENOUS STASIS: ICD-10-CM

## 2021-07-29 DIAGNOSIS — L97.321 SKIN ULCER OF LEFT ANKLE, LIMITED TO BREAKDOWN OF SKIN (H): ICD-10-CM

## 2021-07-29 DIAGNOSIS — E11.610 CHARCOT FOOT DUE TO DIABETES MELLITUS (H): ICD-10-CM

## 2021-07-29 DIAGNOSIS — E11.51 DIABETES MELLITUS WITH PERIPHERAL VASCULAR DISEASE (H): ICD-10-CM

## 2021-07-29 DIAGNOSIS — L97.912 ULCER OF RIGHT LOWER EXTREMITY WITH FAT LAYER EXPOSED (H): ICD-10-CM

## 2021-07-29 PROCEDURE — 99214 OFFICE O/P EST MOD 30 MIN: CPT | Performed by: PODIATRIST

## 2021-07-29 RX ORDER — CEPHALEXIN 500 MG/1
500 CAPSULE ORAL 2 TIMES DAILY
Qty: 28 CAPSULE | Refills: 0 | Status: SHIPPED | OUTPATIENT
Start: 2021-07-29 | End: 2021-08-17

## 2021-07-29 NOTE — NURSING NOTE
Amos Walker's chief complaint for this visit includes:  Chief Complaint   Patient presents with     RECHECK     Right anterior ankle ulcer/left medial ankle ulcer     PCP: Racheal Swift    Referring Provider:  No referring provider defined for this encounter.    BP (!) 148/59 (BP Location: Right arm, Patient Position: Sitting, Cuff Size: Adult Regular)   Pulse (!) 43   SpO2 99%   Data Unavailable     Do you need any medication refills at today's visit? Yesenia Vidal CMA
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
## 2021-07-29 NOTE — PATIENT INSTRUCTIONS
Thanks for coming today.  [facility]  [address] [place] [address]    To schedule future appointments in Ortho Clinic, you may call [phone].    To schedule ordered imaging by your provider:   Call [facility] [phone]    To schedule an injection ordered by your provider:  Call [facility] Injection scheduling line: [phone]  Mobile [facility] available online at:  [email or web address]    Please call if any further questions or concerns ([phone]).  Clinic hours 8 am to 5 pm.    Return to clinic (call) if symptoms worsen or fail to improve.
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
## 2021-08-10 ENCOUNTER — OFFICE VISIT (OUTPATIENT)
Dept: PODIATRY | Facility: CLINIC | Age: 76
End: 2021-08-10
Payer: COMMERCIAL

## 2021-08-10 VITALS — SYSTOLIC BLOOD PRESSURE: 147 MMHG | DIASTOLIC BLOOD PRESSURE: 61 MMHG | OXYGEN SATURATION: 100 % | HEART RATE: 83 BPM

## 2021-08-10 DIAGNOSIS — E11.49 TYPE II OR UNSPECIFIED TYPE DIABETES MELLITUS WITH NEUROLOGICAL MANIFESTATIONS, NOT STATED AS UNCONTROLLED(250.60) (H): Primary | ICD-10-CM

## 2021-08-10 DIAGNOSIS — L97.322 SKIN ULCER OF LEFT ANKLE WITH FAT LAYER EXPOSED (H): ICD-10-CM

## 2021-08-10 DIAGNOSIS — E11.51 DIABETES MELLITUS WITH PERIPHERAL VASCULAR DISEASE (H): ICD-10-CM

## 2021-08-10 DIAGNOSIS — L97.912 ULCER OF RIGHT LOWER EXTREMITY WITH FAT LAYER EXPOSED (H): ICD-10-CM

## 2021-08-10 DIAGNOSIS — I87.8 VENOUS STASIS: ICD-10-CM

## 2021-08-10 DIAGNOSIS — E11.610 CHARCOT FOOT DUE TO DIABETES MELLITUS (H): ICD-10-CM

## 2021-08-10 DIAGNOSIS — L84 TYLOMA: ICD-10-CM

## 2021-08-10 PROCEDURE — 11042 DBRDMT SUBQ TIS 1ST 20SQCM/<: CPT | Performed by: PODIATRIST

## 2021-08-10 PROCEDURE — 99214 OFFICE O/P EST MOD 30 MIN: CPT | Mod: 25 | Performed by: PODIATRIST
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
## 2021-08-10 NOTE — NURSING NOTE
Amos Walker's chief complaint for this visit includes:  Chief Complaint   Patient presents with     RECHECK     ulcer left medial ankle, ulcer right anterior ankle     PCP: Racheal Swift    Referring Provider:  No referring provider defined for this encounter.    BP (!) 147/61   Pulse 83   SpO2 100%   Data Unavailable     Do you need any medication refills at today's visit? Yesenia Vidal CMA

## 2021-08-16 ENCOUNTER — LAB (OUTPATIENT)
Dept: LAB | Facility: CLINIC | Age: 76
End: 2021-08-16
Payer: COMMERCIAL

## 2021-08-16 ENCOUNTER — OFFICE VISIT (OUTPATIENT)
Dept: INTERNAL MEDICINE | Facility: CLINIC | Age: 76
End: 2021-08-16
Payer: COMMERCIAL

## 2021-08-16 VITALS
HEIGHT: 75 IN | DIASTOLIC BLOOD PRESSURE: 71 MMHG | WEIGHT: 170.6 LBS | HEART RATE: 94 BPM | BODY MASS INDEX: 21.21 KG/M2 | OXYGEN SATURATION: 100 % | SYSTOLIC BLOOD PRESSURE: 127 MMHG | RESPIRATION RATE: 16 BRPM

## 2021-08-16 DIAGNOSIS — H90.3 ASYMMETRICAL SENSORINEURAL HEARING LOSS: Primary | ICD-10-CM

## 2021-08-16 DIAGNOSIS — Z79.4 TYPE 2 DIABETES MELLITUS WITH DIABETIC PERIPHERAL ANGIOPATHY WITHOUT GANGRENE, WITH LONG-TERM CURRENT USE OF INSULIN (H): ICD-10-CM

## 2021-08-16 DIAGNOSIS — Z71.6 ENCOUNTER FOR TOBACCO USE CESSATION COUNSELING: ICD-10-CM

## 2021-08-16 DIAGNOSIS — Z23 NEED FOR VACCINATION: ICD-10-CM

## 2021-08-16 DIAGNOSIS — E11.51 TYPE 2 DIABETES MELLITUS WITH DIABETIC PERIPHERAL ANGIOPATHY WITHOUT GANGRENE, WITH LONG-TERM CURRENT USE OF INSULIN (H): ICD-10-CM

## 2021-08-16 DIAGNOSIS — M85.80 OSTEOPENIA, UNSPECIFIED LOCATION: ICD-10-CM

## 2021-08-16 LAB
ALBUMIN SERPL-MCNC: 3.6 G/DL (ref 3.4–5)
ALP SERPL-CCNC: 129 U/L (ref 40–150)
ALT SERPL W P-5'-P-CCNC: 12 U/L (ref 0–70)
ANION GAP SERPL CALCULATED.3IONS-SCNC: <1 MMOL/L (ref 3–14)
AST SERPL W P-5'-P-CCNC: 14 U/L (ref 0–45)
BILIRUB SERPL-MCNC: 0.5 MG/DL (ref 0.2–1.3)
BUN SERPL-MCNC: 37 MG/DL (ref 7–30)
CALCIUM SERPL-MCNC: 8.7 MG/DL (ref 8.5–10.1)
CHLORIDE BLD-SCNC: 108 MMOL/L (ref 94–109)
CHOLEST SERPL-MCNC: 109 MG/DL
CO2 SERPL-SCNC: 27 MMOL/L (ref 20–32)
CREAT SERPL-MCNC: 1.29 MG/DL (ref 0.52–1.25)
CREAT UR-MCNC: 112 MG/DL
FASTING STATUS PATIENT QL REPORTED: NO
GFR SERPL CREATININE-BSD FRML MDRD: 54 ML/MIN/1.73M2
GLUCOSE BLD-MCNC: 143 MG/DL (ref 70–99)
HBA1C MFR BLD: 6.4 % (ref 0–5.6)
HDLC SERPL-MCNC: 60 MG/DL
LDLC SERPL CALC-MCNC: 33 MG/DL
MICROALBUMIN UR-MCNC: 34 MG/L
MICROALBUMIN/CREAT UR: 30.36 MG/G CR (ref 0–25)
NONHDLC SERPL-MCNC: 49 MG/DL
POTASSIUM BLD-SCNC: 4.4 MMOL/L (ref 3.4–5.3)
PROT SERPL-MCNC: 7.1 G/DL (ref 6.8–8.8)
SODIUM SERPL-SCNC: 131 MMOL/L (ref 133–144)
TRIGL SERPL-MCNC: 80 MG/DL
TSH SERPL DL<=0.005 MIU/L-ACNC: 1.21 MU/L (ref 0.4–4)

## 2021-08-16 PROCEDURE — 80061 LIPID PANEL: CPT | Performed by: PATHOLOGY

## 2021-08-16 PROCEDURE — 83036 HEMOGLOBIN GLYCOSYLATED A1C: CPT | Performed by: PATHOLOGY

## 2021-08-16 PROCEDURE — G0009 ADMIN PNEUMOCOCCAL VACCINE: HCPCS | Performed by: INTERNAL MEDICINE

## 2021-08-16 PROCEDURE — 90732 PPSV23 VACC 2 YRS+ SUBQ/IM: CPT | Performed by: INTERNAL MEDICINE

## 2021-08-16 PROCEDURE — 82043 UR ALBUMIN QUANTITATIVE: CPT | Performed by: PATHOLOGY

## 2021-08-16 PROCEDURE — 84443 ASSAY THYROID STIM HORMONE: CPT | Performed by: PATHOLOGY

## 2021-08-16 PROCEDURE — 99213 OFFICE O/P EST LOW 20 MIN: CPT | Mod: 25 | Performed by: INTERNAL MEDICINE

## 2021-08-16 PROCEDURE — 36415 COLL VENOUS BLD VENIPUNCTURE: CPT | Performed by: PATHOLOGY

## 2021-08-16 PROCEDURE — 80053 COMPREHEN METABOLIC PANEL: CPT | Performed by: PATHOLOGY

## 2021-08-16 ASSESSMENT — PAIN SCALES - GENERAL: PAINLEVEL: NO PAIN (0)

## 2021-08-16 ASSESSMENT — MIFFLIN-ST. JEOR: SCORE: 1591.53

## 2021-08-16 NOTE — NURSING NOTE
Amos Walker is a 76 year old adult patient that presents today in clinic for the following:    Chief Complaint   Patient presents with     RECHECK     Three month follow-up--he reports no new health concerns at this time.        The patient's allergies and medications were reviewed as noted. A set of vitals were recorded as noted without incident. The patient does not have any other questions for the provider.    Kurt Mcfarlane, EMT at 12:49 PM on 8/16/2021

## 2021-08-16 NOTE — PROGRESS NOTES
History of Present Illness:  Mr. Walker is a 76 year old adult who presents for  Chief Complaint   Patient presents with     RECHECK     Three month follow-up--he reports no new health concerns at this time.      PMH involving tobacco use, DM, HTN, CAD, PAD, Right Charcot foot, diabetic neuropathy, emphysema, tobacco use, anemia of chronic disease, MGUS.    In interim,  He had dexa and Ct lung scan.  Will start taking calcium pill.  BP today was stable.  He is taking 7.5 mg lisinopril.  A1c today was 6.4.  He denies lows, rarely to 60.  He reports erratic HR, pulses can range.  He reports shingles in the past.  He smokes 5-10 per day.     Currently dealing with L medial and R anterior ulcers.  The R ankle recently opened up after wearing Unna boot.  He did have vascular L femoral balloon angioplasty in January.  He did have a fracture of his L elbow after falling this past winter and required ORIF.  It is mildly painful, dull.    He reports tremor when writing.  Some issues with balance chronically given above issues.  No stiffness.  Ongoing for 1-2 years.  Sister had Parkinsons.    Routine Health Maintenance  Immunizations:   Most Recent Immunizations   Administered Date(s) Administered     COVID-19,PF,Pfizer 05/06/2021     Influenza (High Dose) 3 valent vaccine 10/04/2019     Influenza (IIV3) PF 11/01/2013     Influenza, Quad, High Dose, Pf, 65yr + 10/05/2020     Pneumo Conj 13-V (2010&after) 11/03/2014     Pneumococcal 23 valent 12/21/2015     TDAP Vaccine (Boostrix) 03/21/2016      Lipids:   Recent Labs   Lab Test 09/02/20  0931 03/27/19  0934 11/18/14  0853 11/18/14  0853   CHOL 107 95   < > 110   HDL 69 38*   < > 45   LDL 22 49   < > 45   TRIG 80 40   < > 100   CHOLHDLRATIO  --   --   --  2.4    < > = values in this interval not displayed.      PSA (50-75 yrs): No results found for: PSA  AAA Screening (65-75 yrs): CT 12/17, negative  Lung Ca Screening (>30 py 55-79 or >20 py 50-79 + RF): 7/20,  due  Colonoscopy (50-75 yrs): due 6/24, 6/19 Impression:          - The examined portion of the ileum was normal.                        - One 5 mm polyp in the cecum, removed with a cold snare                        and removed using injection-lift and a cold snare.                        Resected and retrieved.                        - One 6 mm polyp in the transverse colon, removed with a                        hot snare and removed using injection-lift and a hot                        snare. Resected and retrieved. Clip was placed.                        - One 5 mm polyp in the sigmoid colon, removed with a                        hot snare. Resected and retrieved.                        - The examination was otherwise normal on direct and                        retroflexion views.                        NOTE: the polyp reported as being in the descending                        colon in the prior colonoscopy report appears on images                        in the transverse colon; that is where we removed a                        likely SSA. The descending colon was inspected on                        multiple occasions and no polyps were identified.   Recommendation:      - Return to referring physician.                        - Repeat colonoscopy in 5 years for surveillance.                                                                           HIV/HCV if risk factors: nonreactive HepC 6/16  Safety/Lifestyle: reviewed  Tob/EtOH: declines quitting  Depression:   PHQ-2 Score:     PHQ-2 ( 1999 Pfizer) 1/5/2021 5/18/2020   Q1: Little interest or pleasure in doing things 0 0   Q2: Feeling down, depressed or hopeless 1 1   PHQ-2 Score 1 1        Advanced Directive: deferred         Review of external notes as documented above                   A detailed Review of Systems was performed, verified and is negative except as documented in the HPI.  All health questionnaires were reviewed, verified and relevant  information documented above.      Past Medical History:  Past Medical History:   Diagnosis Date     Anemia      CAD (coronary artery disease)     2V CAD involving LAD and RCA, s/p DESx4 in 3/18     CKD (chronic kidney disease) stage 3, GFR 30-59 ml/min      Colon polyp      Diabetic Charcot foot (H)      Emphysema of lung (H)     noted on CT     Heart disease      HTN (hypertension)      Hyperlipidemia      MRSA cellulitis of right foot     in past.      Osteopenia of both hips      PAD (peripheral artery disease) (H) 09/2018    s/p R femoral enarterectomy and stenting      Tobacco use     50+ pack     Type 2 diabetes mellitus (H)     for 25 yrs.  on insulin and starlix     Venous ulcer (H)        Active Meds:  Current Outpatient Medications   Medication     acetaminophen (TYLENOL) 325 MG tablet     ammonium lactate (LAC-HYDRIN) 12 % external cream     ascorbic acid 500 MG TABS     aspirin 81 MG EC tablet     blood glucose monitoring (FREESTYLE) lancets     cephALEXin (KEFLEX) 500 MG capsule     cetirizine (ZYRTEC) 10 MG tablet     clopidogrel (PLAVIX) 75 MG tablet     Continuous Blood Gluc  (FREESTYLE LATOYA 14 DAY READER) TANIA     continuous blood glucose monitoring (FREESTYLE LATOYA) sensor     dulaglutide (TRULICITY) 1.5 MG/0.5ML pen     ferrous sulfate (FEROSUL) 325 (65 Fe) MG tablet     insulin lispro (HUMALOG KWIKPEN) 100 UNIT/ML (1 unit dial) KWIKPEN     insulin pen needle (B-D U/F) 31G X 8 MM miscellaneous     ketoconazole (NIZORAL) 2 % external shampoo     lisinopril (ZESTRIL) 2.5 MG tablet     ONETOUCH ULTRA test strip     silver sulfADIAZINE (SSD) 1 % external cream     simvastatin (ZOCOR) 40 MG tablet     triamcinolone (KENALOG) 0.025 % external ointment     triamcinolone (KENALOG) 0.1 % external cream     triamcinolone (KENALOG) 0.1 % external cream     VITAMIN D, CHOLECALCIFEROL, PO     cefadroxil (DURICEF) 500 MG capsule     gentamicin (GARAMYCIN) 0.1 % external cream     No current  "facility-administered medications for this visit.        Allergies:  Reviewed, refer to EMR    Relevant Social History:  Social History     Tobacco Use     Smoking status: Current Every Day Smoker     Packs/day: 0.25     Years: 50.00     Pack years: 12.50     Types: Cigarettes     Smokeless tobacco: Never Used     Tobacco comment: heavier smoker in the past   Substance Use Topics     Alcohol use: No     Drug use: No        Physical Exam:  Vitals: /71 (BP Location: Left arm, Patient Position: Sitting, Cuff Size: Adult Regular)   Pulse 94   Resp 16   Ht 1.892 m (6' 2.5\")   Wt 77.4 kg (170 lb 9.6 oz)   SpO2 100%   BMI 21.61 kg/m    Constitutional: Alert, oriented, pleasant, no acute distress  Head: Normocephalic, atraumatic  Eyes: Extra-ocular movements intact, pupils equally round bilaterally, no scleral icterus  ENT: Oropharynx clear, moist mucus membranes  Cardiovascular: Regular rate and rhythm, no murmurs, rubs or gallops, peripheral pulses full/symmetric  Respiratory: Good air movement bilaterally, lungs clear, no wheezes/rales/rhonchi  Musculoskeletal: No edema, normal muscle tone, normal gait  Neurologic: Alert and oriented, cranial nerves 2-12 intact, grossly non-focal  Psychiatric: normal mentation, affect and mood      Diagnostics:  Labs reviewed in Epic          Assessment and Plan:  Amos was seen today for recheck.    Diagnoses and all orders for this visit:    Asymmetrical sensorineural hearing loss  -     Adult Audiology Referral; Future    Other orders  -     Pneumococcal vaccine 23 valent PPSV23  (Pneumovax) [[id number]]    Discussed dexa results, rec calcium once daily. Consider BP therapy in future.    Discussed recommendations for shingles vaccine.    Discussed smoking cessation.        Racheal Swift MD  Internal Medicine          "

## 2021-08-17 ENCOUNTER — OFFICE VISIT (OUTPATIENT)
Dept: PODIATRY | Facility: CLINIC | Age: 76
End: 2021-08-17
Payer: COMMERCIAL

## 2021-08-17 VITALS — SYSTOLIC BLOOD PRESSURE: 136 MMHG | DIASTOLIC BLOOD PRESSURE: 76 MMHG | OXYGEN SATURATION: 98 % | HEART RATE: 82 BPM

## 2021-08-17 DIAGNOSIS — L97.912 ULCER OF RIGHT LOWER EXTREMITY WITH FAT LAYER EXPOSED (H): ICD-10-CM

## 2021-08-17 DIAGNOSIS — I87.8 VENOUS STASIS: ICD-10-CM

## 2021-08-17 DIAGNOSIS — E11.610 CHARCOT FOOT DUE TO DIABETES MELLITUS (H): ICD-10-CM

## 2021-08-17 DIAGNOSIS — E11.51 DIABETES MELLITUS WITH PERIPHERAL VASCULAR DISEASE (H): ICD-10-CM

## 2021-08-17 DIAGNOSIS — E11.49 TYPE II OR UNSPECIFIED TYPE DIABETES MELLITUS WITH NEUROLOGICAL MANIFESTATIONS, NOT STATED AS UNCONTROLLED(250.60) (H): Primary | ICD-10-CM

## 2021-08-17 DIAGNOSIS — L97.322 SKIN ULCER OF LEFT ANKLE WITH FAT LAYER EXPOSED (H): ICD-10-CM

## 2021-08-17 DIAGNOSIS — L97.321 SKIN ULCER OF LEFT ANKLE, LIMITED TO BREAKDOWN OF SKIN (H): ICD-10-CM

## 2021-08-17 PROCEDURE — 99214 OFFICE O/P EST MOD 30 MIN: CPT | Performed by: PODIATRIST

## 2021-08-17 RX ORDER — CEPHALEXIN 500 MG/1
500 CAPSULE ORAL 2 TIMES DAILY
Qty: 28 CAPSULE | Refills: 0 | Status: SHIPPED | OUTPATIENT
Start: 2021-08-17 | End: 2021-11-17
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
## 2021-08-17 NOTE — NURSING NOTE
Amos Walker's chief complaint for this visit includes:  Chief Complaint   Patient presents with     RECHECK     Right anterior ankle ulcer/left medial ankle ulcer     PCP: Racheal Swift    Referring Provider:  No referring provider defined for this encounter.    /76   Pulse 82   SpO2 98%   Data Unavailable     Do you need any medication refills at today's visit? No    Maya Vidal CMA

## 2021-08-17 NOTE — PATIENT INSTRUCTIONS
Thanks for coming today.  Ortho[facility] Medicine Clinic  [address] [place] [address]    To schedule future appointments in Ortho Clinic, you may call [phone].    To schedule ordered imaging by your provider:   Call Central Imaging [phone]    To schedule an injection ordered by your provider:  Call Central Imaging Injection scheduling line: [phone]  [email or web address] available online at:  [email or web address]    Please call if any further questions or concerns ([phone]).  Clinic hours 8 am to 5 pm.    Return to clinic (call) if symptoms worsen or fail to improve.
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
## 2021-08-23 DIAGNOSIS — I73.9 PVD (PERIPHERAL VASCULAR DISEASE) (H): ICD-10-CM

## 2021-08-23 DIAGNOSIS — I25.10 CORONARY ARTERY DISEASE DUE TO LIPID RICH PLAQUE: ICD-10-CM

## 2021-08-23 DIAGNOSIS — E11.40 TYPE 2 DIABETES, CONTROLLED, WITH NEUROPATHY (H): ICD-10-CM

## 2021-08-23 DIAGNOSIS — I25.83 CORONARY ARTERY DISEASE DUE TO LIPID RICH PLAQUE: ICD-10-CM

## 2021-08-24 ENCOUNTER — OFFICE VISIT (OUTPATIENT)
Dept: PODIATRY | Facility: CLINIC | Age: 76
End: 2021-08-24
Payer: COMMERCIAL

## 2021-08-24 VITALS — SYSTOLIC BLOOD PRESSURE: 157 MMHG | OXYGEN SATURATION: 100 % | HEART RATE: 81 BPM | DIASTOLIC BLOOD PRESSURE: 71 MMHG

## 2021-08-24 DIAGNOSIS — L97.321 SKIN ULCER OF LEFT ANKLE, LIMITED TO BREAKDOWN OF SKIN (H): ICD-10-CM

## 2021-08-24 DIAGNOSIS — E11.51 DIABETES MELLITUS WITH PERIPHERAL VASCULAR DISEASE (H): ICD-10-CM

## 2021-08-24 DIAGNOSIS — I87.8 VENOUS STASIS: ICD-10-CM

## 2021-08-24 DIAGNOSIS — E11.49 TYPE II OR UNSPECIFIED TYPE DIABETES MELLITUS WITH NEUROLOGICAL MANIFESTATIONS, NOT STATED AS UNCONTROLLED(250.60) (H): Primary | ICD-10-CM

## 2021-08-24 DIAGNOSIS — E11.610 CHARCOT FOOT DUE TO DIABETES MELLITUS (H): ICD-10-CM

## 2021-08-24 DIAGNOSIS — L97.912 ULCER OF RIGHT LOWER EXTREMITY WITH FAT LAYER EXPOSED (H): ICD-10-CM

## 2021-08-24 PROCEDURE — 99214 OFFICE O/P EST MOD 30 MIN: CPT | Performed by: PODIATRIST

## 2021-08-24 NOTE — PATIENT INSTRUCTIONS
Thanks for coming today.  Ortho[facility] Medicine Clinic  [address] [place] [address]    To schedule future appointments in Ortho Clinic, you may call [phone].    To schedule ordered imaging by your provider:   Call Central Imaging [phone]    To schedule an injection ordered by your provider:  Call Central Imaging Injection scheduling line: [phone]  [email or web address] available online at:  [email or web address]    Please call if any further questions or concerns ([phone]).  Clinic hours 8 am to 5 pm.    Return to clinic (call) if symptoms worsen or fail to improve.
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
## 2021-08-24 NOTE — NURSING NOTE
Amos Walker's chief complaint for this visit includes:  Chief Complaint   Patient presents with     RECHECK     right anterior ankle ulcer, left medial ankle ulcer     PCP: Racheal Swift    Referring Provider:  No referring provider defined for this encounter.    BP (!) 157/71 (BP Location: Right arm, Patient Position: Sitting, Cuff Size: Adult Regular)   Pulse 81   SpO2 100%   Data Unavailable     Do you need any medication refills at today's visit? Yesenia Vidal CMA

## 2021-08-25 RX ORDER — SIMVASTATIN 40 MG
40 TABLET ORAL AT BEDTIME
Qty: 90 TABLET | Refills: 3 | Status: SHIPPED | OUTPATIENT
Start: 2021-08-25 | End: 2022-12-05

## 2021-08-31 ENCOUNTER — OFFICE VISIT (OUTPATIENT)
Dept: PODIATRY | Facility: CLINIC | Age: 76
End: 2021-08-31
Payer: COMMERCIAL

## 2021-08-31 VITALS — HEART RATE: 87 BPM | SYSTOLIC BLOOD PRESSURE: 137 MMHG | OXYGEN SATURATION: 99 % | DIASTOLIC BLOOD PRESSURE: 59 MMHG

## 2021-08-31 DIAGNOSIS — L84 TYLOMA: ICD-10-CM

## 2021-08-31 DIAGNOSIS — E11.51 DIABETES MELLITUS WITH PERIPHERAL VASCULAR DISEASE (H): ICD-10-CM

## 2021-08-31 DIAGNOSIS — E11.610 CHARCOT FOOT DUE TO DIABETES MELLITUS (H): ICD-10-CM

## 2021-08-31 DIAGNOSIS — L97.912 ULCER OF RIGHT LOWER EXTREMITY WITH FAT LAYER EXPOSED (H): ICD-10-CM

## 2021-08-31 DIAGNOSIS — E11.49 TYPE II OR UNSPECIFIED TYPE DIABETES MELLITUS WITH NEUROLOGICAL MANIFESTATIONS, NOT STATED AS UNCONTROLLED(250.60) (H): Primary | ICD-10-CM

## 2021-08-31 DIAGNOSIS — I87.8 VENOUS STASIS: ICD-10-CM

## 2021-08-31 DIAGNOSIS — L97.321 SKIN ULCER OF LEFT ANKLE, LIMITED TO BREAKDOWN OF SKIN (H): ICD-10-CM

## 2021-08-31 PROCEDURE — 99214 OFFICE O/P EST MOD 30 MIN: CPT | Performed by: PODIATRIST

## 2021-09-07 ENCOUNTER — OFFICE VISIT (OUTPATIENT)
Dept: PODIATRY | Facility: CLINIC | Age: 76
End: 2021-09-07
Payer: COMMERCIAL

## 2021-09-07 VITALS — DIASTOLIC BLOOD PRESSURE: 75 MMHG | OXYGEN SATURATION: 99 % | SYSTOLIC BLOOD PRESSURE: 145 MMHG | HEART RATE: 83 BPM

## 2021-09-07 DIAGNOSIS — E11.49 TYPE II OR UNSPECIFIED TYPE DIABETES MELLITUS WITH NEUROLOGICAL MANIFESTATIONS, NOT STATED AS UNCONTROLLED(250.60) (H): Primary | ICD-10-CM

## 2021-09-07 DIAGNOSIS — E11.610 CHARCOT FOOT DUE TO DIABETES MELLITUS (H): ICD-10-CM

## 2021-09-07 DIAGNOSIS — I87.8 VENOUS STASIS: ICD-10-CM

## 2021-09-07 DIAGNOSIS — E11.51 DIABETES MELLITUS WITH PERIPHERAL VASCULAR DISEASE (H): ICD-10-CM

## 2021-09-07 PROCEDURE — 99214 OFFICE O/P EST MOD 30 MIN: CPT | Performed by: PODIATRIST

## 2021-09-07 RX ORDER — CEPHALEXIN 500 MG/1
500 CAPSULE ORAL 2 TIMES DAILY
Qty: 60 CAPSULE | Refills: 0 | Status: SHIPPED | OUTPATIENT
Start: 2021-09-07 | End: 2021-11-17
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
## 2021-09-07 NOTE — NURSING NOTE
Amos Walker's chief complaint for this visit includes:  Chief Complaint   Patient presents with     RECHECK     right anterior ankle ulcer/left medial ankle ulcer     PCP: Racheal Swift    Referring Provider:  No referring provider defined for this encounter.    BP (!) 145/75 (BP Location: Left arm, Patient Position: Sitting, Cuff Size: Adult Regular)   Pulse 83   SpO2 99%   Data Unavailable     Do you need any medication refills at today's visit? Yesenia Vidal CMA

## 2021-09-07 NOTE — PATIENT INSTRUCTIONS
Thanks for coming today.  Ortho/Sports Medicine Clinic  [address] [place] [address]    To schedule future appointments in Ortho Clinic, you may call [phone].    To schedule ordered imaging by your provider:   Call [facility] [phone]    To schedule an injection ordered by your provider:  Call [facility] Injection scheduling line: [phone]  Drive Powerhart available online at:  [email or web address]    Please call if any further questions or concerns ([phone]).  Clinic hours 8 am to 5 pm.    Return to clinic (call) if symptoms worsen or fail to improve.
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
## 2021-09-09 DIAGNOSIS — I73.9 PAD (PERIPHERAL ARTERY DISEASE) (H): Primary | ICD-10-CM

## 2021-09-10 ENCOUNTER — ANCILLARY PROCEDURE (OUTPATIENT)
Dept: ULTRASOUND IMAGING | Facility: CLINIC | Age: 76
End: 2021-09-10
Attending: SURGERY
Payer: COMMERCIAL

## 2021-09-10 DIAGNOSIS — I73.9 PAD (PERIPHERAL ARTERY DISEASE) (H): ICD-10-CM

## 2021-09-10 PROCEDURE — 93922 UPR/L XTREMITY ART 2 LEVELS: CPT | Mod: GC | Performed by: RADIOLOGY

## 2021-09-13 ENCOUNTER — VIRTUAL VISIT (OUTPATIENT)
Dept: VASCULAR SURGERY | Facility: CLINIC | Age: 76
End: 2021-09-13
Payer: COMMERCIAL

## 2021-09-13 DIAGNOSIS — Z72.0 TOBACCO ABUSE: ICD-10-CM

## 2021-09-13 DIAGNOSIS — I73.9 PAD (PERIPHERAL ARTERY DISEASE) (H): Primary | ICD-10-CM

## 2021-09-13 DIAGNOSIS — L97.309 VENOUS STASIS ULCER OF ANKLE, UNSPECIFIED LATERALITY, UNSPECIFIED ULCER STAGE, UNSPECIFIED WHETHER VARICOSE VEINS PRESENT (H): ICD-10-CM

## 2021-09-13 DIAGNOSIS — I70.243 ATHEROSCLEROSIS OF NATIVE ARTERY OF LEFT LOWER EXTREMITY WITH ULCERATION OF ANKLE (H): ICD-10-CM

## 2021-09-13 DIAGNOSIS — I83.003 VENOUS STASIS ULCER OF ANKLE, UNSPECIFIED LATERALITY, UNSPECIFIED ULCER STAGE, UNSPECIFIED WHETHER VARICOSE VEINS PRESENT (H): ICD-10-CM

## 2021-09-13 DIAGNOSIS — I70.233 ATHEROSCLEROSIS OF NATIVE ARTERIES OF RIGHT LEG WITH ULCERATION OF ANKLE (H): ICD-10-CM

## 2021-09-13 PROCEDURE — 99212 OFFICE O/P EST SF 10 MIN: CPT | Mod: 95 | Performed by: NURSE PRACTITIONER

## 2021-09-13 NOTE — PROGRESS NOTES
Called patient to inform her that her RX is ready for her to  at the . Patient verbalized understanding and will  tomorrow. Vascular Surgery Clinic Progress Note:    Amos is a 76 year old who is being evaluated via a billable telephone visit.      What phone number would you like to be contacted at? [phone]    How would you like to obtain your AVS? Mail a copy    Assessment & Plan   Problem List Items Addressed This Visit     Atherosclerosis of native artery of left lower extremity with ulceration of ankle (H)    PAD (peripheral artery disease) (H) - Primary      Other Visit Diagnoses     Tobacco abuse        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)        Venous stasis ulcer of ankle, unspecified laterality, unspecified ulcer stage, unspecified whether varicose veins present (H)               Review of the result(s) of each unique test - CAROL with significant abnormal findings in the LLE and markedly decreased toe pressures.  Ordering of each unique test  Prescription drug management  No LOS data to display   Time spent doing chart review, history and exam, documentation and further activities per the note       76 year old male with PMH significant for tobacco abuse, DMII, HTN, HLD, emphysema, CAD, CKD, venous stasis ulcers, and PAD who underwent balloon angioplasty of the left SF and popliteal arteries on 1/12/2021 with Dr. Maharaj.  Patient follows with Dr. Mcclain from podiatry for management of bilateral foot wounds and venous stasis ulcers.  His CAROL on the left is significantly worse than 68 months ago (previousy 0.59, now 0.33 and his TBI is 0.17 (previousy 0.46), likely contributing to LLE persistant wound.  This wound does not show signs of infection and appears to be stable.  He denies symptoms of claudication or rest pain, however, he is not walking much due to discomfort from his brace.  Unfortunately, he continues to smoke despite previous attempts to quit. I councelled him on smoking cessation and provided resources which he declined.  I also offered PAD walking program which he  also declined at this time.  He has an elevated Cr, so I will hold off on a CTA at this time and discuss possible angiogram instead with one of the surgical attendings.  Mr. Walker is hoping he can avoid the angiogram if possible, but is agreeable if there are interventions that might help his wound heal.    Plan:  -LLE arterial duplex ordered.  -Smoking cessation strongly encouraged-  -advised patient to discontinue coban wraps, would recommend light compression stockings for venous stasis  -Patient advised to develop a regular walking program with a goal of at least 30 minutes per day.  -Continue Aspirin, plavix, and simvastatin  -Continue weekly wound care with Dr. Mcclain  -Will consult with vascular surgeon about angiogram vs. Repeat CAROL in 3 months once duple obtained.        Ana Maria Syed NP  [facility] CHAVEZ Trinidad is a 76 year old who presents for the following health issues:    FARIHA Walker is a 76 year old male with PMH significant for 76 year old male with PMH significant for tobacco abuse, DMII, HTN, HLD, emphysema, CAD, CKD, venous stasis ulcers, and PAD who underwent balloon angioplasty of the left SF and popliteal arteries on 1/12/2021 with Dr. Maharaj. He presents today for his 6 month surveillance visit today.    He reports overall he is doing well, but he is very concerned about his worsening CAROL.  He has been following weekly with Dr. Mcclain in clinic for management of his bilateral foot and ankle wounds.  He states after his angioplasty of the LLE the wound had improved though they never fully healed.  He states they did scab over.  He also reports that Dr. Mcclain did a recent I&D and that there was bleeding in the wound following the procedure. He is concerned that his Coban wraps have been a contributing factor to his worsening CAROL and he would like to stop these. He reports he has not been walking much due to discomfort from his brace,  but he denies symptoms of claudication and rest pain.  He is able to walk up the stairs in his home without much pain. He continues to smoke about 10 cigarettes a day.    Review of Systems   Constitutional, HEENT, cardiovascular, pulmonary, gi and gu systems are negative, except as otherwise noted.      Objective           Vitals:  No vitals were obtained today due to virtual visit.    Physical Exam   healthy, alert and no distress  PSYCH: Alert and oriented times 3; coherent speech, normal   rate and volume, able to articulate logical thoughts, able   to abstract reason, no tangential thoughts, no hallucinations   or delusions  His affect is normal  RESP: No cough, no audible wheezing, able to talk in full sentences  Remainder of exam unable to be completed due to telephone visits  EXTREMITIES: LLE ankle ulcer with eschar, no surrounding redness, or swelling, skin appears dry and flaky. Small wound on 2nd toe and  great toe near the nail covered with eschar, no surrounding redness or swelling. Right anterior ankle ulcer without surrounding redness or swelling.                Results:  CAROL with PPG W/O exercise bilateral 9/10/2021:    Impression:      Right leg:   A. Resting CAROL is 0.68. Mild to moderately abnormal, 0.41-0.90.  B. TBI is 0.41. Moderately abnormal.  C. Moderately abnormal waveforms in the fourth and fifth digits.     Left leg:   A. Resting CAROL is 0.33. Severely abnormal, 0.0-0.40. Abnormal  waveforms below the knee suggest popliteal stenosis.  B. TBI is 0.17. Severely abnormal.  C. 2nd through 5th digit occlusion or near occlusion. Moderately  abnormal waveform in the first digit.            Phone call duration: 17 minutes

## 2021-09-13 NOTE — PATIENT INSTRUCTIONS
-Left leg ultrasound ordered.  -Smoking cessation strongly encouraged  -discontinue coban wraps, would recommend light compression stockings for venous stasis  -Patient advised to develop a regular walking program with a goal of at least 30 minutes per day.  -Continue Aspirin, plavix, and simvastatin  -Continue weekly wound care with Dr. Mcclain  -Will consult with vascular surgeon about angiogram vs. Repeat CAROL in 3 months once duple obtained.    With questions, concerns, or to request an appointment, please call either:    Afsaneh Faulkner, Care Coordinator RN, Vascular Surgery  [phone]    Vascular Call Center  [phone]    To contact someone after 5 pm, on a weekend, or on a Holiday, please call:  [facility]  [phone], option 4 to have the attending physician for Vascular Surgery Service paged.

## 2021-09-13 NOTE — LETTER
9/13/2021       RE: Amos Walker  [address] [place] [address]     Dear Colleague,    Thank you for referring your patient, Amos Walker, to the [facility] VASCULAR CLINIC [place] at [facility]. Please see a copy of my visit note below.    [facility]    Vascular Surgery Clinic Progress Note:    Assessment & Plan   Problem List Items Addressed This Visit     Atherosclerosis of native artery of left lower extremity with ulceration of ankle (H)    PAD (peripheral artery disease) (H) - Primary      Other Visit Diagnoses     Tobacco abuse        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)        Venous stasis ulcer of ankle, unspecified laterality, unspecified ulcer stage, unspecified whether varicose veins present (H)               Review of the result(s) of each unique test - CAROL with significant abnormal findings in the LLE and markedly decreased toe pressures.  Ordering of each unique test  Prescription drug management  No LOS data to display   Time spent doing chart review, history and exam, documentation and further activities per the note       76 year old male with PMH significant for tobacco abuse, DMII, HTN, HLD, emphysema, CAD, CKD, venous stasis ulcers, and PAD who underwent balloon angioplasty of the left SF and popliteal arteries on 1/12/2021 with Dr. Maharaj.  Patient follows with Dr. Mcclain from podiatry for management of bilateral foot wounds and venous stasis ulcers.  His CAROL on the left is significantly worse than 68 months ago (previousy 0.59, now 0.33 and his TBI is 0.17 (previousy 0.46), likely contributing to LLE persistant wound.  This wound does not show signs of infection and appears to be stable.  He denies symptoms of claudication or rest pain, however, he is not walking much due to discomfort from his brace.  Unfortunately, he continues to smoke despite previous attempts to  quit. I councelled him on smoking cessation and provided resources which he declined.  I also offered PAD walking program which he also declined at this time.  He has an elevated Cr, so I will hold off on a CTA at this time and discuss possible angiogram instead with one of the surgical attendings.  Mr. Walker is hoping he can avoid the angiogram if possible, but is agreeable if there are interventions that might help his wound heal.    Plan:  -LLE arterial duplex ordered.  -Smoking cessation strongly encouraged-  -advised patient to discontinue coban wraps, would recommend light compression stockings for venous stasis  -Patient advised to develop a regular walking program with a goal of at least 30 minutes per day.  -Continue Aspirin, plavix, and simvastatin  -Continue weekly wound care with Dr. Mcclain  -Will consult with vascular surgeon about angiogram vs. Repeat CAROL in 3 months once duple obtained.        Ana Maria Syed NP  [facility] VASCULAR CLINIC CHAVEZ Trinidad is a 76 year old who presents for the following health issues:    FARIHA Walker is a 76 year old male with PMH significant for 76 year old male with PMH significant for tobacco abuse, DMII, HTN, HLD, emphysema, CAD, CKD, venous stasis ulcers, and PAD who underwent balloon angioplasty of the left SF and popliteal arteries on 1/12/2021 with Dr. Maharaj. He presents today for his 6 month surveillance visit today.    He reports overall he is doing well, but he is very concerned about his worsening CAROL.  He has been following weekly with Dr. Mcclain in clinic for management of his bilateral foot and ankle wounds.  He states after his angioplasty of the LLE the wound had improved though they never fully healed.  He states they did scab over.  He also reports that Dr. Mcclain did a recent I&D and that there was bleeding in the wound following the procedure. He is concerned that his Coban wraps have been a contributing  factor to his worsening CAROL and he would like to stop these. He reports he has not been walking much due to discomfort from his brace, but he denies symptoms of claudication and rest pain.  He is able to walk up the stairs in his home without much pain. He continues to smoke about 10 cigarettes a day.    Review of Systems   Constitutional, HEENT, cardiovascular, pulmonary, gi and gu systems are negative, except as otherwise noted.      Objective           Vitals:  No vitals were obtained today due to virtual visit.    Physical Exam   healthy, alert and no distress  PSYCH: Alert and oriented times 3; coherent speech, normal   rate and volume, able to articulate logical thoughts, able   to abstract reason, no tangential thoughts, no hallucinations   or delusions  His affect is normal  RESP: No cough, no audible wheezing, able to talk in full sentences  Remainder of exam unable to be completed due to telephone visits  EXTREMITIES: LLE ankle ulcer with eschar, no surrounding redness, or swelling, skin appears dry and flaky. Small wound on 2nd toe and  great toe near the nail covered with eschar, no surrounding redness or swelling. Right anterior ankle ulcer without surrounding redness or swelling.                Results:  CAROL with PPG W/O exercise bilateral 9/10/2021:    Impression:      Right leg:   A. Resting CAROL is 0.68. Mild to moderately abnormal, 0.41-0.90.  B. TBI is 0.41. Moderately abnormal.  C. Moderately abnormal waveforms in the fourth and fifth digits.     Left leg:   A. Resting CAROL is 0.33. Severely abnormal, 0.0-0.40. Abnormal  waveforms below the knee suggest popliteal stenosis.  B. TBI is 0.17. Severely abnormal.  C. 2nd through 5th digit occlusion or near occlusion. Moderately  abnormal waveform in the first digit.      Again, thank you for allowing me to participate in the care of your patient.      Sincerely,    Ana Maria Syed NP

## 2021-09-13 NOTE — PROGRESS NOTES
[facility]      Type of Visit: Virtual: Other: Telephone - [phone]    Patient is here for a return visit to discuss Peripheral artery disease (PAD). Symptoms include  none  in both lower extremities.            Vitals:  No vitals were obtained today due to virtual visit.      Questions patient would like addressed today are: Patient verbalized no questions/concerns, this has been communicated to the provider.     Refills are needed: No    How would you like to obtain your AVS? Gabriela Colon LPN

## 2021-09-14 ENCOUNTER — OFFICE VISIT (OUTPATIENT)
Dept: OTOLARYNGOLOGY | Facility: CLINIC | Age: 76
End: 2021-09-14
Payer: COMMERCIAL

## 2021-09-14 ENCOUNTER — OFFICE VISIT (OUTPATIENT)
Dept: AUDIOLOGY | Facility: CLINIC | Age: 76
End: 2021-09-14
Attending: INTERNAL MEDICINE
Payer: COMMERCIAL

## 2021-09-14 VITALS — DIASTOLIC BLOOD PRESSURE: 76 MMHG | SYSTOLIC BLOOD PRESSURE: 173 MMHG | HEART RATE: 93 BPM | OXYGEN SATURATION: 99 %

## 2021-09-14 DIAGNOSIS — H90.3 SENSORINEURAL HEARING LOSS (SNHL) OF BOTH EARS: Primary | ICD-10-CM

## 2021-09-14 DIAGNOSIS — H61.23 BILATERAL IMPACTED CERUMEN: ICD-10-CM

## 2021-09-14 DIAGNOSIS — H60.8X3 CHRONIC ECZEMATOUS OTITIS EXTERNA OF BOTH EARS: Primary | ICD-10-CM

## 2021-09-14 DIAGNOSIS — H90.3 ASYMMETRICAL SENSORINEURAL HEARING LOSS: ICD-10-CM

## 2021-09-14 PROCEDURE — 92557 COMPREHENSIVE HEARING TEST: CPT | Performed by: AUDIOLOGIST

## 2021-09-14 PROCEDURE — 99204 OFFICE O/P NEW MOD 45 MIN: CPT | Mod: 25 | Performed by: OTOLARYNGOLOGY

## 2021-09-14 PROCEDURE — 99207 PR NO CHARGE LOS: CPT | Performed by: AUDIOLOGIST

## 2021-09-14 PROCEDURE — 69210 REMOVE IMPACTED EAR WAX UNI: CPT | Performed by: OTOLARYNGOLOGY

## 2021-09-14 PROCEDURE — 92567 TYMPANOMETRY: CPT | Performed by: AUDIOLOGIST

## 2021-09-14 RX ORDER — TRIAMCINOLONE ACETONIDE 1 MG/G
OINTMENT TOPICAL 2 TIMES DAILY
Qty: 30 G | Refills: 0 | Status: SHIPPED | OUTPATIENT
Start: 2021-09-14 | End: 2023-04-19

## 2021-09-14 ASSESSMENT — ENCOUNTER SYMPTOMS
COUGH: 0
DIZZINESS: 0
HEARTBURN: 0
PHOTOPHOBIA: 0
HEADACHES: 0
STRIDOR: 0
DOUBLE VISION: 0
SPUTUM PRODUCTION: 0
TREMORS: 0
BRUISES/BLEEDS EASILY: 0
SINUS PAIN: 0
BLURRED VISION: 0
TINGLING: 0
HEMOPTYSIS: 0
CONSTITUTIONAL NEGATIVE: 1
NAUSEA: 0
VOMITING: 0

## 2021-09-14 NOTE — LETTER
9/14/2021         RE: Amos Walker  [address]  [place] [address]        Dear Colleague,    Thank you for referring your patient, Amos Walker, to the [facility]. Please see a copy of my visit note below.    HPI    This is a 76 year old patient wears binaural hearing aids for the several years. Reports bilateral tinnitus when he is not wearing his hearing aids. Reports significant itchiness in both ears. Denies other otologic symptoms. No vision issues, weakness, numbness, or tingling.    Wears hearing aids for longstanding bilateral severe hearing loss. Denies new concerns.  Results: Moderately-severe to severe SNHL bilaterally. 60% word rec. right, 48% left. Right tymp WNL. Slightly shallow tymp left.    Review of Systems   Constitutional: Negative.    HENT: Positive for hearing loss and tinnitus. Negative for congestion, ear discharge, ear pain, nosebleeds and sinus pain.    Eyes: Negative for blurred vision, double vision and photophobia.   Respiratory: Negative for cough, hemoptysis, sputum production and stridor.    Gastrointestinal: Negative for heartburn, nausea and vomiting.   Skin: Negative.    Neurological: Negative for dizziness, tingling, tremors and headaches.   Endo/Heme/Allergies: Negative for environmental allergies. Does not bruise/bleed easily.         Physical Exam  Vitals reviewed.   Constitutional:       Appearance: Normal appearance.   HENT:      Head: Normocephalic and atraumatic.      Right Ear: Tympanic membrane, ear canal and external ear normal. Decreased hearing noted. No middle ear effusion. There is no impacted cerumen.      Left Ear: Tympanic membrane, ear canal and external ear normal. Decreased hearing noted.  No middle ear effusion. There is no impacted cerumen.      Nose:      Right Turbinates: Not enlarged or swollen.      Left Turbinates: Not enlarged or swollen.      Mouth/Throat:      Mouth: Mucous membranes are moist.      Pharynx:  Oropharynx is clear. Uvula midline.   Eyes:      Extraocular Movements: Extraocular movements intact.      Pupils: Pupils are equal, round, and reactive to light.   Neurological:      Mental Status: He is alert.       Bilateral ear wax that blocked the EAC 70% suctioned with number 7 and 5 and ear drums appeared to be intact.. Bilateral eczematous otitis externa.     A/P  This pleasant patient Wears hearing aids for longstanding bilateral severe hearing loss. Denies new concerns. Results: Moderately-severe to severe SNHL bilaterally. 60% word rec. right, 48% left. Right tymp WNL. Slightly shallow tymp left. F/u in a year.        Again, thank you for allowing me to participate in the care of your patient.        Sincerely,        John Yen MD

## 2021-09-14 NOTE — PROGRESS NOTES
HPI    This is a 76 year old patient wears binaural hearing aids for the several years. Reports bilateral tinnitus when he is not wearing his hearing aids. Reports significant itchiness in both ears. Denies other otologic symptoms. No vision issues, weakness, numbness, or tingling.    Wears hearing aids for longstanding bilateral severe hearing loss. Denies new concerns.  Results: Moderately-severe to severe SNHL bilaterally. 60% word rec. right, 48% left. Right tymp WNL. Slightly shallow tymp left.    Review of Systems   Constitutional: Negative.    HENT: Positive for hearing loss and tinnitus. Negative for congestion, ear discharge, ear pain, nosebleeds and sinus pain.    Eyes: Negative for blurred vision, double vision and photophobia.   Respiratory: Negative for cough, hemoptysis, sputum production and stridor.    Gastrointestinal: Negative for heartburn, nausea and vomiting.   Skin: Negative.    Neurological: Negative for dizziness, tingling, tremors and headaches.   Endo/Heme/Allergies: Negative for environmental allergies. Does not bruise/bleed easily.         Physical Exam  Vitals reviewed.   Constitutional:       Appearance: Normal appearance.   HENT:      Head: Normocephalic and atraumatic.      Right Ear: Tympanic membrane, ear canal and external ear normal. Decreased hearing noted. No middle ear effusion. There is no impacted cerumen.      Left Ear: Tympanic membrane, ear canal and external ear normal. Decreased hearing noted.  No middle ear effusion. There is no impacted cerumen.      Nose:      Right Turbinates: Not enlarged or swollen.      Left Turbinates: Not enlarged or swollen.      Mouth/Throat:      Mouth: Mucous membranes are moist.      Pharynx: Oropharynx is clear. Uvula midline.   Eyes:      Extraocular Movements: Extraocular movements intact.      Pupils: Pupils are equal, round, and reactive to light.   Neurological:      Mental Status: He is alert.       Bilateral ear wax that blocked the  EAC 70% suctioned with number 7 and 5 and ear drums appeared to be intact.. Bilateral eczematous otitis externa.     A/P  This pleasant patient Wears hearing aids for longstanding bilateral severe hearing loss. Denies new concerns. Results: Moderately-severe to severe SNHL bilaterally. 60% word rec. right, 48% left. Right tymp WNL. Slightly shallow tymp left. F/u in a year.

## 2021-09-14 NOTE — PROGRESS NOTES
AUDIOLOGY REPORT    SUMMARY: Audiology visit completed. See audiogram for results.    RECOMMENDATIONS: Follow-up with ENT.    Tierra Georges  Doctor of Audiology  [place] License # [id number]

## 2021-09-14 NOTE — NURSING NOTE
Amos Walker's goals for this visit include:   Chief Complaint   Patient presents with     Consult     Hearing loss       He requests these members of his care team be copied on today's visit information: yes    PCP: Racheal Swift    Referring Provider:  No referring provider defined for this encounter.    BP (!) 173/76   Pulse 93   SpO2 99%     Do you need any medication refills at today's visit? no

## 2021-09-15 ENCOUNTER — OFFICE VISIT (OUTPATIENT)
Dept: PODIATRY | Facility: CLINIC | Age: 76
End: 2021-09-15
Payer: COMMERCIAL

## 2021-09-15 VITALS — SYSTOLIC BLOOD PRESSURE: 150 MMHG | HEART RATE: 86 BPM | DIASTOLIC BLOOD PRESSURE: 69 MMHG | OXYGEN SATURATION: 98 %

## 2021-09-15 DIAGNOSIS — I87.8 VENOUS STASIS: ICD-10-CM

## 2021-09-15 DIAGNOSIS — E11.49 TYPE II OR UNSPECIFIED TYPE DIABETES MELLITUS WITH NEUROLOGICAL MANIFESTATIONS, NOT STATED AS UNCONTROLLED(250.60) (H): Primary | ICD-10-CM

## 2021-09-15 DIAGNOSIS — L97.321 SKIN ULCER OF LEFT ANKLE, LIMITED TO BREAKDOWN OF SKIN (H): ICD-10-CM

## 2021-09-15 DIAGNOSIS — L97.912 ULCER OF RIGHT LOWER EXTREMITY WITH FAT LAYER EXPOSED (H): ICD-10-CM

## 2021-09-15 DIAGNOSIS — E11.610 CHARCOT FOOT DUE TO DIABETES MELLITUS (H): ICD-10-CM

## 2021-09-15 DIAGNOSIS — E11.51 DIABETES MELLITUS WITH PERIPHERAL VASCULAR DISEASE (H): ICD-10-CM

## 2021-09-15 PROCEDURE — 99214 OFFICE O/P EST MOD 30 MIN: CPT | Performed by: PODIATRIST

## 2021-09-15 NOTE — NURSING NOTE
Amos Walker's chief complaint for this visit includes:  Chief Complaint   Patient presents with     RECHECK     ulcer, right anterior ankle & ulcer left medial ankle     PCP: Racheal Swift    Referring Provider:  No referring provider defined for this encounter.    BP (!) 150/69 (BP Location: Left arm, Patient Position: Sitting, Cuff Size: Adult Regular)   Pulse 86   SpO2 98%   Data Unavailable     Do you need any medication refills at today's visit? Yesenia Vidal CMA
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
## 2021-09-15 NOTE — PATIENT INSTRUCTIONS
Thanks for coming today.  [facility]  [address] [place] [address]    To schedule future appointments in Ortho Clinic, you may call [phone].    To schedule ordered imaging by your provider:   Call [facility] [phone]    To schedule an injection ordered by your provider:  Call [facility] Injection scheduling line: [phone]  [employer] available online at:  [email or web address]    Please call if any further questions or concerns ([phone]).  Clinic hours 8 am to 5 pm.    Return to clinic (call) if symptoms worsen or fail to improve.

## 2021-09-18 ENCOUNTER — HEALTH MAINTENANCE LETTER (OUTPATIENT)
Age: 76
End: 2021-09-18

## 2021-09-22 ENCOUNTER — OFFICE VISIT (OUTPATIENT)
Dept: PODIATRY | Facility: CLINIC | Age: 76
End: 2021-09-22
Payer: COMMERCIAL

## 2021-09-22 VITALS — DIASTOLIC BLOOD PRESSURE: 73 MMHG | HEART RATE: 106 BPM | OXYGEN SATURATION: 100 % | SYSTOLIC BLOOD PRESSURE: 165 MMHG

## 2021-09-22 DIAGNOSIS — E11.51 DIABETES MELLITUS WITH PERIPHERAL VASCULAR DISEASE (H): ICD-10-CM

## 2021-09-22 DIAGNOSIS — E11.49 TYPE II OR UNSPECIFIED TYPE DIABETES MELLITUS WITH NEUROLOGICAL MANIFESTATIONS, NOT STATED AS UNCONTROLLED(250.60) (H): Primary | ICD-10-CM

## 2021-09-22 DIAGNOSIS — E11.610 CHARCOT FOOT DUE TO DIABETES MELLITUS (H): ICD-10-CM

## 2021-09-22 DIAGNOSIS — I87.8 VENOUS STASIS: ICD-10-CM

## 2021-09-22 DIAGNOSIS — L97.322 SKIN ULCER OF LEFT ANKLE WITH FAT LAYER EXPOSED (H): ICD-10-CM

## 2021-09-22 DIAGNOSIS — L97.912 ULCER OF RIGHT LOWER EXTREMITY WITH FAT LAYER EXPOSED (H): ICD-10-CM

## 2021-09-22 PROCEDURE — 99214 OFFICE O/P EST MOD 30 MIN: CPT | Mod: 25 | Performed by: PODIATRIST

## 2021-09-22 PROCEDURE — 97597 DBRDMT OPN WND 1ST 20 CM/<: CPT | Performed by: PODIATRIST
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
## 2021-09-22 NOTE — NURSING NOTE
Amos Walker's chief complaint for this visit includes:  Chief Complaint   Patient presents with     RECHECK     Bilateral ankle ulcers     PCP: Racheal Swift    Referring Provider:  No referring provider defined for this encounter.    There were no vitals taken for this visit.  Data Unavailable     Do you need any medication refills at today's visit? No    Paola Laws MA, ATC
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
## 2021-09-28 ENCOUNTER — OFFICE VISIT (OUTPATIENT)
Dept: PODIATRY | Facility: CLINIC | Age: 76
End: 2021-09-28
Payer: COMMERCIAL

## 2021-09-28 VITALS — DIASTOLIC BLOOD PRESSURE: 70 MMHG | OXYGEN SATURATION: 98 % | SYSTOLIC BLOOD PRESSURE: 158 MMHG | HEART RATE: 93 BPM

## 2021-09-28 DIAGNOSIS — E11.51 DIABETES MELLITUS WITH PERIPHERAL VASCULAR DISEASE (H): ICD-10-CM

## 2021-09-28 DIAGNOSIS — E11.610 CHARCOT FOOT DUE TO DIABETES MELLITUS (H): ICD-10-CM

## 2021-09-28 DIAGNOSIS — L97.322 SKIN ULCER OF LEFT ANKLE WITH FAT LAYER EXPOSED (H): ICD-10-CM

## 2021-09-28 DIAGNOSIS — E11.49 TYPE II OR UNSPECIFIED TYPE DIABETES MELLITUS WITH NEUROLOGICAL MANIFESTATIONS, NOT STATED AS UNCONTROLLED(250.60) (H): Primary | ICD-10-CM

## 2021-09-28 DIAGNOSIS — I87.8 VENOUS STASIS: ICD-10-CM

## 2021-09-28 DIAGNOSIS — L97.912 ULCER OF RIGHT LOWER EXTREMITY WITH FAT LAYER EXPOSED (H): ICD-10-CM

## 2021-09-28 PROCEDURE — 99214 OFFICE O/P EST MOD 30 MIN: CPT | Performed by: PODIATRIST
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
## 2021-09-29 DIAGNOSIS — E11.40 TYPE 2 DIABETES, CONTROLLED, WITH NEUROPATHY (H): ICD-10-CM

## 2021-09-29 RX ORDER — PEN NEEDLE, DIABETIC 31 GX5/16"
NEEDLE, DISPOSABLE MISCELLANEOUS
Qty: 400 EACH | Refills: 3 | Status: SHIPPED | OUTPATIENT
Start: 2021-09-29 | End: 2022-12-08

## 2021-09-29 NOTE — TELEPHONE ENCOUNTER
insulin pen needle (B-D U/F) 31G X 8 MM miscellaneous  Last Written Prescription Date:  9/19/2020  Last Fill Quantity: 400,   # refills: 3  Last Office Visit : 8/16/2021  Future Office visit:  None  400 Each, 3 Refills sent to pharm 9/29/2021      Celeste Arceo RN  Central Triage Red Flags/Med Refills

## 2021-10-05 ENCOUNTER — ANCILLARY PROCEDURE (OUTPATIENT)
Dept: ULTRASOUND IMAGING | Facility: CLINIC | Age: 76
End: 2021-10-05
Attending: NURSE PRACTITIONER
Payer: COMMERCIAL

## 2021-10-05 ENCOUNTER — OFFICE VISIT (OUTPATIENT)
Dept: PODIATRY | Facility: CLINIC | Age: 76
End: 2021-10-05
Payer: COMMERCIAL

## 2021-10-05 VITALS — OXYGEN SATURATION: 99 % | HEART RATE: 88 BPM | SYSTOLIC BLOOD PRESSURE: 150 MMHG | DIASTOLIC BLOOD PRESSURE: 76 MMHG

## 2021-10-05 DIAGNOSIS — E11.51 DIABETES MELLITUS WITH PERIPHERAL VASCULAR DISEASE (H): ICD-10-CM

## 2021-10-05 DIAGNOSIS — I70.243 ATHEROSCLEROSIS OF NATIVE ARTERY OF LEFT LOWER EXTREMITY WITH ULCERATION OF ANKLE (H): ICD-10-CM

## 2021-10-05 DIAGNOSIS — I87.8 VENOUS STASIS: ICD-10-CM

## 2021-10-05 DIAGNOSIS — E11.49 TYPE II OR UNSPECIFIED TYPE DIABETES MELLITUS WITH NEUROLOGICAL MANIFESTATIONS, NOT STATED AS UNCONTROLLED(250.60) (H): Primary | ICD-10-CM

## 2021-10-05 DIAGNOSIS — L97.912 ULCER OF RIGHT LOWER EXTREMITY WITH FAT LAYER EXPOSED (H): ICD-10-CM

## 2021-10-05 DIAGNOSIS — L97.322 SKIN ULCER OF LEFT ANKLE WITH FAT LAYER EXPOSED (H): ICD-10-CM

## 2021-10-05 PROCEDURE — 99214 OFFICE O/P EST MOD 30 MIN: CPT | Performed by: PODIATRIST

## 2021-10-05 PROCEDURE — 93926 LOWER EXTREMITY STUDY: CPT | Mod: LT | Performed by: RADIOLOGY
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
## 2021-10-05 NOTE — NURSING NOTE
Amos Walker's chief complaint for this visit includes:  Chief Complaint   Patient presents with     RECHECK     Right anterior ankle ulcer, left medial ankle ulcer     PCP: Racheal Swift    Referring Provider:  No referring provider defined for this encounter.    There were no vitals taken for this visit.  Data Unavailable     Do you need any medication refills at today's visit? No    Paola Laws MA, ATC
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
## 2021-10-12 ENCOUNTER — OFFICE VISIT (OUTPATIENT)
Dept: PODIATRY | Facility: CLINIC | Age: 76
End: 2021-10-12
Payer: COMMERCIAL

## 2021-10-12 VITALS — HEART RATE: 83 BPM | OXYGEN SATURATION: 100 % | SYSTOLIC BLOOD PRESSURE: 133 MMHG | DIASTOLIC BLOOD PRESSURE: 56 MMHG

## 2021-10-12 DIAGNOSIS — L97.321 SKIN ULCER OF LEFT ANKLE, LIMITED TO BREAKDOWN OF SKIN (H): ICD-10-CM

## 2021-10-12 DIAGNOSIS — E11.49 TYPE II OR UNSPECIFIED TYPE DIABETES MELLITUS WITH NEUROLOGICAL MANIFESTATIONS, NOT STATED AS UNCONTROLLED(250.60) (H): Primary | ICD-10-CM

## 2021-10-12 DIAGNOSIS — L97.912 ULCER OF RIGHT LOWER EXTREMITY WITH FAT LAYER EXPOSED (H): ICD-10-CM

## 2021-10-12 DIAGNOSIS — E11.610 CHARCOT FOOT DUE TO DIABETES MELLITUS (H): ICD-10-CM

## 2021-10-12 DIAGNOSIS — I87.8 VENOUS STASIS: ICD-10-CM

## 2021-10-12 DIAGNOSIS — E11.51 DIABETES MELLITUS WITH PERIPHERAL VASCULAR DISEASE (H): ICD-10-CM

## 2021-10-12 PROCEDURE — 99214 OFFICE O/P EST MOD 30 MIN: CPT | Performed by: PODIATRIST

## 2021-10-12 ASSESSMENT — PAIN SCALES - GENERAL: PAINLEVEL: NO PAIN (0)
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
## 2021-10-12 NOTE — PATIENT INSTRUCTIONS
Thanks for coming today.  [facility]  [address] [place] [address]    To schedule future appointments in Ortho Clinic, you may call [phone].    To schedule ordered imaging by your provider:   Call [facility] [phone]    To schedule an injection ordered by your provider:  Call [facility] Injection scheduling line: [phone]  [employer] available online at:  [email or web address]    Please call if any further questions or concerns ([phone]).  Clinic hours 8 am to 5 pm.    Return to clinic (call) if symptoms worsen or fail to improve.

## 2021-10-12 NOTE — NURSING NOTE
Amos Walker's chief complaint for this visit includes:  Chief Complaint   Patient presents with     RECHECK     Right anterior ankle ulcer / Left medial ankle ulcer     PCP: Racheal Swift    Referring Provider:  No referring provider defined for this encounter.    /56 (BP Location: Right arm)   Pulse 83   SpO2 100%   No Pain (0)     Do you need any medication refills at today's visit? Yesenia Vidal CMA
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
## 2021-11-02 ENCOUNTER — OFFICE VISIT (OUTPATIENT)
Dept: PODIATRY | Facility: CLINIC | Age: 76
End: 2021-11-02
Payer: COMMERCIAL

## 2021-11-02 ENCOUNTER — TELEPHONE (OUTPATIENT)
Dept: INTERNAL MEDICINE | Facility: CLINIC | Age: 76
End: 2021-11-02

## 2021-11-02 VITALS — OXYGEN SATURATION: 100 % | SYSTOLIC BLOOD PRESSURE: 143 MMHG | HEART RATE: 94 BPM | DIASTOLIC BLOOD PRESSURE: 57 MMHG

## 2021-11-02 DIAGNOSIS — E11.51 DIABETES MELLITUS WITH PERIPHERAL VASCULAR DISEASE (H): ICD-10-CM

## 2021-11-02 DIAGNOSIS — L97.321 SKIN ULCER OF LEFT ANKLE, LIMITED TO BREAKDOWN OF SKIN (H): ICD-10-CM

## 2021-11-02 DIAGNOSIS — I87.8 VENOUS STASIS: ICD-10-CM

## 2021-11-02 DIAGNOSIS — E11.49 TYPE II OR UNSPECIFIED TYPE DIABETES MELLITUS WITH NEUROLOGICAL MANIFESTATIONS, NOT STATED AS UNCONTROLLED(250.60) (H): Primary | ICD-10-CM

## 2021-11-02 DIAGNOSIS — L97.912 ULCER OF RIGHT LOWER EXTREMITY WITH FAT LAYER EXPOSED (H): ICD-10-CM

## 2021-11-02 PROCEDURE — 99214 OFFICE O/P EST MOD 30 MIN: CPT | Performed by: PODIATRIST

## 2021-11-02 ASSESSMENT — PAIN SCALES - GENERAL: PAINLEVEL: NO PAIN (0)
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
## 2021-11-02 NOTE — TELEPHONE ENCOUNTER
Patient inquiring if he needs to receive the booster for the covid vaccine along with requesting an order be placed for the flu shot .

## 2021-11-02 NOTE — NURSING NOTE
Amos Walker's chief complaint for this visit includes:  Chief Complaint   Patient presents with     RECHECK     right anterior ankle ulcer/left medial ankle ulcer     PCP: Racheal Swift    Referring Provider:  No referring provider defined for this encounter.    BP (!) 165/78   Pulse 105   SpO2 100%   No Pain (0)     Do you need any medication refills at today's visit? No    Maya Vidal CMA

## 2021-11-02 NOTE — NURSING NOTE
Amos Walker's chief complaint for this visit includes:  Chief Complaint   Patient presents with     RECHECK     right anterior ankle ulcer/left medial ankle ulcer     PCP: Racheal Swift    Referring Provider:  No referring provider defined for this encounter.    BP (!) 143/57   Pulse 94   SpO2 100%   No Pain (0)     Do you need any medication refills at today's visit? No    Maya Vidal CMA

## 2021-11-02 NOTE — PATIENT INSTRUCTIONS
Thanks for coming today.  [facility]  [address] [place] [address]    To schedule future appointments in Ortho Clinic, you may call [phone].    To schedule ordered imaging by your provider:   Call [facility] [phone]    To schedule an injection ordered by your provider:  Call [facility] Injection scheduling line: [phone]  [facility] available online at:  [email or web address]    Please call if any further questions or concerns ([phone]).  Clinic hours 8 am to 5 pm.    Return to clinic (call) if symptoms worsen or fail to improve.

## 2021-11-03 NOTE — TELEPHONE ENCOUNTER
Patient was reached by phone, and RN advised that he does need to get both the covid booster and the flu shot.  He plans to go to [facility] to get these immunizations, and Dr. Swift does not need to place orders, as a standing order is already in place for patient to receive due to age or based on immunization record.    Michelle Miller RN on 11/3/2021 at 12:06 PM

## 2021-11-09 ENCOUNTER — OFFICE VISIT (OUTPATIENT)
Dept: PODIATRY | Facility: CLINIC | Age: 76
End: 2021-11-09
Payer: COMMERCIAL

## 2021-11-09 VITALS — SYSTOLIC BLOOD PRESSURE: 139 MMHG | OXYGEN SATURATION: 99 % | HEART RATE: 94 BPM | DIASTOLIC BLOOD PRESSURE: 73 MMHG

## 2021-11-09 DIAGNOSIS — E11.51 DIABETES MELLITUS WITH PERIPHERAL VASCULAR DISEASE (H): ICD-10-CM

## 2021-11-09 DIAGNOSIS — L97.321 SKIN ULCER OF LEFT ANKLE, LIMITED TO BREAKDOWN OF SKIN (H): ICD-10-CM

## 2021-11-09 DIAGNOSIS — I87.8 VENOUS STASIS: ICD-10-CM

## 2021-11-09 DIAGNOSIS — L97.912 ULCER OF RIGHT LOWER EXTREMITY WITH FAT LAYER EXPOSED (H): ICD-10-CM

## 2021-11-09 DIAGNOSIS — E11.49 TYPE II OR UNSPECIFIED TYPE DIABETES MELLITUS WITH NEUROLOGICAL MANIFESTATIONS, NOT STATED AS UNCONTROLLED(250.60) (H): Primary | ICD-10-CM

## 2021-11-09 PROCEDURE — 99214 OFFICE O/P EST MOD 30 MIN: CPT | Performed by: PODIATRIST

## 2021-11-09 RX ORDER — CALCIUM CARBONATE 500(1250)
1 TABLET ORAL 2 TIMES DAILY
COMMUNITY

## 2021-11-09 ASSESSMENT — PAIN SCALES - GENERAL: PAINLEVEL: NO PAIN (0)
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
## 2021-11-09 NOTE — NURSING NOTE
Amos Walker's chief complaint for this visit includes:  Chief Complaint   Patient presents with     RECHECK     right anterior ankle ulcer, left medial ankle ulcer     PCP: Racheal Swift    Referring Provider:  No referring provider defined for this encounter.    /73   Pulse 94   SpO2 99%   No Pain (0)     Do you need any medication refills at today's visit? No    Maya Vidal CMA

## 2021-11-09 NOTE — PATIENT INSTRUCTIONS
Thanks for coming today.  [facility]  [address] [place] [address]    To schedule future appointments in Ortho Clinic, you may call [phone].    To schedule ordered imaging by your provider:   Call [facility] [phone]    To schedule an injection ordered by your provider:  Call [facility] Injection scheduling line: [phone]  [employer] available online at:  [email or web address]    Please call if any further questions or concerns ([phone]).  Clinic hours 8 am to 5 pm.    Return to clinic (call) if symptoms worsen or fail to improve.
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
## 2021-11-15 ENCOUNTER — DOCUMENTATION ONLY (OUTPATIENT)
Dept: LAB | Facility: CLINIC | Age: 76
End: 2021-11-15
Payer: COMMERCIAL

## 2021-11-15 NOTE — PROGRESS NOTES
JULIAN Osborne has lab armida. On 11/16/2021 with no orders. Can you please place orders for appointment.    Thank You Leilani PURCELL

## 2021-11-16 ENCOUNTER — OFFICE VISIT (OUTPATIENT)
Dept: PODIATRY | Facility: CLINIC | Age: 76
End: 2021-11-16
Payer: COMMERCIAL

## 2021-11-16 ENCOUNTER — APPOINTMENT (OUTPATIENT)
Dept: LAB | Facility: CLINIC | Age: 76
End: 2021-11-16
Payer: COMMERCIAL

## 2021-11-16 ENCOUNTER — PATIENT OUTREACH (OUTPATIENT)
Dept: ONCOLOGY | Facility: CLINIC | Age: 76
End: 2021-11-16

## 2021-11-16 VITALS — DIASTOLIC BLOOD PRESSURE: 68 MMHG | HEART RATE: 93 BPM | OXYGEN SATURATION: 99 % | SYSTOLIC BLOOD PRESSURE: 134 MMHG

## 2021-11-16 DIAGNOSIS — E61.1 IRON DEFICIENCY: ICD-10-CM

## 2021-11-16 DIAGNOSIS — L97.912 ULCER OF RIGHT LOWER EXTREMITY WITH FAT LAYER EXPOSED (H): ICD-10-CM

## 2021-11-16 DIAGNOSIS — D47.2 MGUS (MONOCLONAL GAMMOPATHY OF UNKNOWN SIGNIFICANCE): Primary | ICD-10-CM

## 2021-11-16 DIAGNOSIS — I87.8 VENOUS STASIS: ICD-10-CM

## 2021-11-16 DIAGNOSIS — L97.321 SKIN ULCER OF LEFT ANKLE, LIMITED TO BREAKDOWN OF SKIN (H): ICD-10-CM

## 2021-11-16 DIAGNOSIS — E11.51 DIABETES MELLITUS WITH PERIPHERAL VASCULAR DISEASE (H): ICD-10-CM

## 2021-11-16 DIAGNOSIS — E11.49 TYPE II OR UNSPECIFIED TYPE DIABETES MELLITUS WITH NEUROLOGICAL MANIFESTATIONS, NOT STATED AS UNCONTROLLED(250.60) (H): Primary | ICD-10-CM

## 2021-11-16 DIAGNOSIS — E11.610 CHARCOT FOOT DUE TO DIABETES MELLITUS (H): ICD-10-CM

## 2021-11-16 PROCEDURE — 99214 OFFICE O/P EST MOD 30 MIN: CPT | Performed by: PODIATRIST
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
## 2021-11-16 NOTE — PROGRESS NOTES
Spoke with pt this afternoon after learning from Dr. Sheng Ramirez that pt had arrived for lab draw in [place] but did not have lab orders.  Spoke with lab staff member earlier but he was unable to connect with individual who would know if they hallie any blood.  Apologized to pt regarding no lab orders when he was at clinic.  Per pt, he was turned away because he did not fast per instructions on appt notes.  RN stated we don't usually require pts to fast for labs that we are drawing.  Only lab that we draw that would be (significantly) effected by fasting is blood sugar.  RN will double check if he should hold oral iron supplement prior to draw and get back to him.  Pt planning to have labs drawn next week so have results for 12/1 virtual visit with Dr. Sheng Ramirez.  Sent in basket message with question about holding iron supplement to Dr. Sheng Ramirez.
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
## 2021-11-17 ENCOUNTER — VIRTUAL VISIT (OUTPATIENT)
Dept: VASCULAR SURGERY | Facility: CLINIC | Age: 76
End: 2021-11-17
Payer: COMMERCIAL

## 2021-11-17 ENCOUNTER — PATIENT OUTREACH (OUTPATIENT)
Dept: ONCOLOGY | Facility: CLINIC | Age: 76
End: 2021-11-17

## 2021-11-17 DIAGNOSIS — I73.9 PAD (PERIPHERAL ARTERY DISEASE) (H): Primary | ICD-10-CM

## 2021-11-17 DIAGNOSIS — L97.929 ULCER OF LOWER LIMB, LEFT, WITH UNSPECIFIED SEVERITY (H): ICD-10-CM

## 2021-11-17 PROCEDURE — 99215 OFFICE O/P EST HI 40 MIN: CPT | Mod: 95 | Performed by: SURGERY

## 2021-11-17 NOTE — LETTER
11/17/2021       RE: Amos Walker  [address] [place] [address]     Dear Colleague,    Thank you for referring your patient, Amos Walker, to the [facility]. Please see a copy of my visit note below.    Vascular Surgery Consultation Note     Patient:  Amos Walker   Date of birth 1945, Medical record number [id number]  Date of Visit:  11/17/2021  Consult Requester:No att. providers found            Assessment and Recommendations:   ASSESSMENT / RECOMMENDATION:  Slow healing left medial malleolus ulceration in a 76-year-old male with a history of angioplasty with Dr. Maharaj in January 2021.  He is continuing his excellent wound care with Dr. Chappell and notes no drainage, no rest pain and slow healing of the area.  He has had some issues with extensive bruising on his forearms.  He still continues to smoke however is on dual antiplatelet therapy with no recent drug-eluting stent or angioplasty.  I have suggested he give a 1 month holiday from the aspirin and continue the Plavix to see if the bruising will improve.  We will plan for an CAROL in 6 months.  I have notified Dr. Chappell to let us know if the healing should stall or not be completed.  He had an opportunity to ask all questions.  I will be seeing him back in the spring.      Many thanks for involving me in the care of this very pleasant patient. Should any questions or concerns arise, please don't hesitate to contact me.    Warm Regards,    Karma Lyons MD, DFSVS, RPVI  Director, [facility] Vascular Services  Professor and Chief, Vascular and Endovascular Surgery  [facility]  [email or web address]          45 minutes spent on the date of the encounter doing chart review, review of outside records, review of test results, interpretation of tests, patient visit, documentation and discussion with other provider(s)           HPI: Mr Walker  is a 76-year-old male smoker with lower extremity ulcerations.  He had undergone angioplasty of the left lower extremity with Dr. Maharaj in January of this year.  He has a small medial malleoli ulceration which is continue to improve.  There was a recent setback with some type Coban wrapping but this is now improved since releasing the Coban.  He does walk and would like to do more exercise but does have some issues secondary to his Charcot joints.  He does walk around the house okay but does not get out to walk significantly.      Review of Systems   Constitutional, HEENT, cardiovascular, pulmonary, gi and gu systems are negative, except as otherwise noted.    Physical Exam   GENERAL: Healthy, alert and no distress  EYES: Eyes grossly normal to inspection.  No discharge or erythema, or obvious scleral/conjunctival abnormalities.  RESP: No audible wheeze, cough, or visible cyanosis.  No visible retractions or increased work of breathing.    SKIN: Visible skin clear. No significant rash, abnormal pigmentation or lesions.  NEURO: Cranial nerves grossly intact.  Mentation and speech appropriate for age.  PSYCH: Mentation appears normal, affect normal/bright, judgement and insight intact, normal speech and appearance well-groomed.      His left lower his left lower extremity arterial duplex obtained in October shows a biphasic popliteal waveform with peak systolic velocity of 54 cm/s.  His posterior tibial artery velocities are approximately 35 cm/s.          Again, thank you for allowing me to participate in the care of your patient.      Sincerely,    Karma Lyons MD

## 2021-11-17 NOTE — PATIENT INSTRUCTIONS
Thank you so much for choosing us for your care. It was a pleasure to see you at the vascular clinic today.     Follow-up recommendations: We will see you back for a virtual visit in 6 months. Please hold your aspirin for the next month to see if this helps with your bruising. Continue taking your Plavix.    Additional testing/imaging ordered today: Repeat ABIs in 6 months prior to seeing Dr. Lyons.      Our scheduling team will get in touch with you to set up any follow-up testing/imaging and/or appointments. Please be aware that any testing/imaging recommended today will need to completed prior to your next visit with the provider. If testing/imaging is not completed prior to your next visit, your visit may be rescheduled.        If you have any questions, please call Afsaneh Faulkner RN at [phone]. We also encourage the use of [employer] to communicate with your HealthCare Provider.      If you have an urgent need after business hours (8:00 am to 4:30 pm) please call [phone], option 4, and ask for the vascular attending on call. For non-urgent after hours needs, please call the vascular nurse at [phone] and leave a detailed voicemail. For scheduling needs, please call our scheduling line at [phone].    Patient Education     Peripheral Artery Disease (PAD)   Peripheral artery disease (PAD) occurs when the arteries that carry blood to the limbs are narrowed or blocked. This is usually due to a buildup of a fatty substance called plaque in the walls of the arteries.  PAD most often affects the arteries in the legs. When these arteries are narrowed or blocked, blood flow to the legs is reduced. This can cause leg and foot pain and other symptoms. If severe enough, reduced blood flow to the legs can lead to tissue death (gangrene) and the loss of a toe, foot, or leg. Having PAD also makes it more likely that arteries in other body areas are blocked. For instance, arteries that carry blood to the heart  or brain may be affected. This raises the chances of heart attack and stroke.  Risk factors  Certain factors can make PAD more likely. They include:    Smoking    Diabetes    High blood pressure    Unhealthy cholesterol levels    Obesity    Inactive lifestyle    Older age    Family history of PAD  Symptoms  Many people with PAD have no symptoms. If symptoms do occur, they can include:    Pain in the muscles of the calves, thighs, or hips that gets worse with activity and better with rest (intermittent claudication)    Achy, tired, or heavy feeling in the legs    Weakness, numbness, tingling, or loss of feeling in the legs    Changes in skin color of the legs    Sores on the legs and feet    Cold leg, feet, or toes    Pain the feet or toes even when lying down (rest pain)  Home care  PAD is a chronic (lifelong) condition. Treatment is focused on managing your condition and lowering your health risks. This may include doing the following:    If you smoke, quit. This helps prevent further damage to your arteries and lowers your health risks. Ask your provider about medicines or products that can help you quit smoking. Also consider joining a stop-smoking program or support group.    Be more active. This helps you lose weight and manage problems such as high blood pressure and unhealthy cholesterol levels. Start a walking program if advised to by your provider. Your provider may also help you form a safe exercise program that is right for your needs.    Make healthy eating changes. This includes eating less fat, salt, and sugar.    Take medicines for high blood pressure, unhealthy cholesterol levels, and diabetes as directed.    Have your blood pressure and cholesterol levels checked as often as directed.    If you have diabetes, try to keep your blood sugar well controlled. Test your blood sugar as directed.    If you are overweight, talk to your provider about a weight-loss plan.    Watch for cuts, scrapes, or open  sores on your feet. Poor blood flow to the feet may slow healing and increase the risk of infection from these problems.   Follow-up care  Follow up with your healthcare provider, or as advised. If imaging tests such as ultrasound were done, they will be reviewed by a doctor. You will be told the results and any new findings that may affect your care.  When to seek medical advice   Call your healthcare provider right away if any of these occur:    Sudden severe pain in the legs or feet    Sudden cold, pale or blue color in the legs or feet    Weakness or numbness in the legs or feet that worsens    Any sore or wound in the legs or feet that won t heal    Weak pulse in your legs or feet  Know the signs of heart attack and stroke  People with PAD are at high risk for heart attack and stroke. Knowing the signs of these problems can help you protect your health and get help when you need it. Call 911 right away if you have any of the following:    Chest discomfort, such as pain, aching, tightness, or pressure that lasts more than a few minutes, or that comes and goes    Pain or discomfort in the arms, back, shoulders, neck, or jaw    Shortness of breath    Sweating (often a cold, clammy sweat)    Nausea    Lightheadedness    Sudden numbness or weakness of the face, arms, or legs, especially on one side    Sudden confusion or trouble speaking or understanding    Sudden trouble seeing in one or both eyes    Sudden trouble walking, dizziness, or loss of balance    Sudden, severe headache with no known cause  Yung last reviewed this educational content on 3/1/2018    9347-8031 [employer]. All rights reserved. This information is not intended as a substitute for professional medical care. Always follow your healthcare professional's instructions.

## 2021-11-17 NOTE — PROGRESS NOTES
Left message for pt stating, per communication with Dr. Sheng Ramirez, he should hold iron supplement morning of lab draw (can still take evening before).  Pt appreciated update.  He will schedule lab appt.

## 2021-11-17 NOTE — PROGRESS NOTES
Vascular Surgery Consultation Note     Patient:  Amos Walker   Date of birth 1945, Medical record number [id number]  Date of Visit:  11/17/2021  Consult Requester:No att. providers found            Assessment and Recommendations:   ASSESSMENT / RECOMMENDATION:  Slow healing left medial malleolus ulceration in a 76-year-old male with a history of angioplasty with Dr. Maharaj in January 2021.  He is continuing his excellent wound care with Dr. Chappell and notes no drainage, no rest pain and slow healing of the area.  He has had some issues with extensive bruising on his forearms.  He still continues to smoke however is on dual antiplatelet therapy with no recent drug-eluting stent or angioplasty.  I have suggested he give a 1 month holiday from the aspirin and continue the Plavix to see if the bruising will improve.  We will plan for an CAROL in 6 months.  I have notified Dr. Chappell to let us know if the healing should stall or not be completed.  He had an opportunity to ask all questions.  I will be seeing him back in the spring.      Many thanks for involving me in the care of this very pleasant patient. Should any questions or concerns arise, please don't hesitate to contact me.    Warm Regards,    Karma Lyons MD, DFSVS, RPVI  Director, [facility] Services  Professor and Chief, Vascular and Endovascular Surgery  [facility]  [email or web address]          45 minutes spent on the date of the encounter doing chart review, review of outside records, review of test results, interpretation of tests, patient visit, documentation and discussion with other provider(s)           HPI: Mr Walker is a 76-year-old male smoker with lower extremity ulcerations.  He had undergone angioplasty of the left lower extremity with Dr. Maharaj in January of this year.  He has a small medial malleoli ulceration which is continue to improve.  There was a recent setback with some type Coban wrapping but this  is now improved since releasing the Coban.  He does walk and would like to do more exercise but does have some issues secondary to his Charcot joints.  He does walk around the house okay but does not get out to walk significantly.      Review of Systems   Constitutional, HEENT, cardiovascular, pulmonary, gi and gu systems are negative, except as otherwise noted.    Physical Exam   GENERAL: Healthy, alert and no distress  EYES: Eyes grossly normal to inspection.  No discharge or erythema, or obvious scleral/conjunctival abnormalities.  RESP: No audible wheeze, cough, or visible cyanosis.  No visible retractions or increased work of breathing.    SKIN: Visible skin clear. No significant rash, abnormal pigmentation or lesions.  NEURO: Cranial nerves grossly intact.  Mentation and speech appropriate for age.  PSYCH: Mentation appears normal, affect normal/bright, judgement and insight intact, normal speech and appearance well-groomed.      His left lower his left lower extremity arterial duplex obtained in October shows a biphasic popliteal waveform with peak systolic velocity of 54 cm/s.  His posterior tibial artery velocities are approximately 35 cm/s.      Amos is a 76 year old who is being evaluated via a billable video visit.      How would you like to obtain your AVS? MyChart  If the video visit is dropped, the invitation should be resent by: Text to cell phone: [phone] (Call)  Will anyone else be joining your video visit? No    Video Start Time: 2 PM  Video-Visit Details    Type of service:  Video Visit    Video End Time:2:30 PM    Originating Location (pt. Location): Home    Distant Location (provider location):  [facility] [place]     Platform used for Video Visit: [email or web address]

## 2021-11-23 ENCOUNTER — TELEPHONE (OUTPATIENT)
Dept: AUDIOLOGY | Facility: CLINIC | Age: 76
End: 2021-11-23

## 2021-11-23 ENCOUNTER — LAB (OUTPATIENT)
Dept: LAB | Facility: CLINIC | Age: 76
End: 2021-11-23
Payer: COMMERCIAL

## 2021-11-23 DIAGNOSIS — D47.2 MGUS (MONOCLONAL GAMMOPATHY OF UNKNOWN SIGNIFICANCE): ICD-10-CM

## 2021-11-23 DIAGNOSIS — E61.1 IRON DEFICIENCY: ICD-10-CM

## 2021-11-23 LAB
BASOPHILS # BLD AUTO: 0 10E3/UL (ref 0–0.2)
BASOPHILS NFR BLD AUTO: 1 %
CRP SERPL-MCNC: <2.9 MG/L (ref 0–8)
EOSINOPHIL # BLD AUTO: 0.2 10E3/UL (ref 0–0.7)
EOSINOPHIL NFR BLD AUTO: 4 %
ERYTHROCYTE [DISTWIDTH] IN BLOOD BY AUTOMATED COUNT: 13.1 % (ref 10–15)
ERYTHROCYTE [SEDIMENTATION RATE] IN BLOOD BY WESTERGREN METHOD: 11 MM/HR (ref 0–20)
HCT VFR BLD AUTO: 39.7 % (ref 40–53)
HGB BLD-MCNC: 12.8 G/DL (ref 13.3–17.7)
LYMPHOCYTES # BLD AUTO: 1.1 10E3/UL (ref 0.8–5.3)
LYMPHOCYTES NFR BLD AUTO: 23 %
MCH RBC QN AUTO: 31.6 PG (ref 26.5–33)
MCHC RBC AUTO-ENTMCNC: 32.2 G/DL (ref 31.5–36.5)
MCV RBC AUTO: 98 FL (ref 78–100)
MONOCYTES # BLD AUTO: 0.5 10E3/UL (ref 0–1.3)
MONOCYTES NFR BLD AUTO: 10 %
NEUTROPHILS # BLD AUTO: 3 10E3/UL (ref 1.6–8.3)
NEUTROPHILS NFR BLD AUTO: 63 %
PLATELET # BLD AUTO: 173 10E3/UL (ref 150–450)
RBC # BLD AUTO: 4.05 10E6/UL (ref 4.4–5.9)
RETICS # AUTO: 0.06 10E6/UL (ref 0.03–0.1)
RETICS/RBC NFR AUTO: 1.5 % (ref 0.5–2)
WBC # BLD AUTO: 4.8 10E3/UL (ref 4–11)

## 2021-11-23 PROCEDURE — 86140 C-REACTIVE PROTEIN: CPT

## 2021-11-23 PROCEDURE — 82784 ASSAY IGA/IGD/IGG/IGM EACH: CPT

## 2021-11-23 PROCEDURE — 82784 ASSAY IGA/IGD/IGG/IGM EACH: CPT | Mod: 59

## 2021-11-23 PROCEDURE — 85652 RBC SED RATE AUTOMATED: CPT

## 2021-11-23 PROCEDURE — 84550 ASSAY OF BLOOD/URIC ACID: CPT

## 2021-11-23 PROCEDURE — 83883 ASSAY NEPHELOMETRY NOT SPEC: CPT

## 2021-11-23 PROCEDURE — 82728 ASSAY OF FERRITIN: CPT

## 2021-11-23 PROCEDURE — 36415 COLL VENOUS BLD VENIPUNCTURE: CPT

## 2021-11-23 PROCEDURE — 85045 AUTOMATED RETICULOCYTE COUNT: CPT

## 2021-11-23 PROCEDURE — 80053 COMPREHEN METABOLIC PANEL: CPT

## 2021-11-23 PROCEDURE — 85025 COMPLETE CBC W/AUTO DIFF WBC: CPT

## 2021-11-23 PROCEDURE — 83550 IRON BINDING TEST: CPT

## 2021-11-23 NOTE — TELEPHONE ENCOUNTER
Walk-in hearing aid services on 11/23/21: The patient reported his right hearing aid wasn't working.  Examination found the hearing aid itself is working but the  in the cShell earmold was dead.  The cShell is being sent to the  for repair today.  Once received back, it will be attached to the hearing aid (which is being kept at the clinic) and mailed to the patient.  Mr. Walker's insurance will be billed for any potential repair charge.

## 2021-11-24 ENCOUNTER — MYC MEDICAL ADVICE (OUTPATIENT)
Dept: INTERNAL MEDICINE | Facility: CLINIC | Age: 76
End: 2021-11-24
Payer: COMMERCIAL

## 2021-11-24 LAB
ALBUMIN SERPL-MCNC: 3.4 G/DL (ref 3.4–5)
ALP SERPL-CCNC: 114 U/L (ref 40–150)
ALT SERPL W P-5'-P-CCNC: 12 U/L (ref 0–70)
ANION GAP SERPL CALCULATED.3IONS-SCNC: 6 MMOL/L (ref 3–14)
AST SERPL W P-5'-P-CCNC: 17 U/L (ref 0–45)
BILIRUB SERPL-MCNC: 0.5 MG/DL (ref 0.2–1.3)
BUN SERPL-MCNC: 31 MG/DL (ref 7–30)
CALCIUM SERPL-MCNC: 8.7 MG/DL (ref 8.5–10.1)
CHLORIDE BLD-SCNC: 106 MMOL/L (ref 94–109)
CO2 SERPL-SCNC: 26 MMOL/L (ref 20–32)
CREAT SERPL-MCNC: 1.18 MG/DL (ref 0.66–1.25)
FERRITIN SERPL-MCNC: 182 NG/ML (ref 26–388)
GFR SERPL CREATININE-BSD FRML MDRD: 60 ML/MIN/1.73M2
GLUCOSE BLD-MCNC: 179 MG/DL (ref 70–99)
IGA SERPL-MCNC: 340 MG/DL (ref 84–499)
IGG SERPL-MCNC: 1298 MG/DL (ref 610–1616)
IGM SERPL-MCNC: 26 MG/DL (ref 35–242)
IRON SATN MFR SERPL: 36 % (ref 15–46)
IRON SERPL-MCNC: 90 UG/DL (ref 35–180)
KAPPA LC FREE SER-MCNC: 6.42 MG/DL (ref 0.33–1.94)
KAPPA LC FREE/LAMBDA FREE SER NEPH: 2.47 {RATIO} (ref 0.26–1.65)
LAMBDA LC FREE SERPL-MCNC: 2.6 MG/DL (ref 0.57–2.63)
POTASSIUM BLD-SCNC: 4.5 MMOL/L (ref 3.4–5.3)
PROT SERPL-MCNC: 7.3 G/DL (ref 6.8–8.8)
SODIUM SERPL-SCNC: 138 MMOL/L (ref 133–144)
TIBC SERPL-MCNC: 247 UG/DL (ref 240–430)
URATE SERPL-MCNC: 5.4 MG/DL (ref 3.5–7.2)

## 2021-11-29 ENCOUNTER — MYC MEDICAL ADVICE (OUTPATIENT)
Dept: INTERNAL MEDICINE | Facility: CLINIC | Age: 76
End: 2021-11-29
Payer: COMMERCIAL

## 2021-11-29 DIAGNOSIS — I73.9 PERIPHERAL VASCULAR DISEASE, UNSPECIFIED (H): ICD-10-CM

## 2021-11-30 ENCOUNTER — OFFICE VISIT (OUTPATIENT)
Dept: PODIATRY | Facility: CLINIC | Age: 76
End: 2021-11-30
Payer: COMMERCIAL

## 2021-11-30 VITALS — SYSTOLIC BLOOD PRESSURE: 169 MMHG | DIASTOLIC BLOOD PRESSURE: 72 MMHG | HEART RATE: 88 BPM | OXYGEN SATURATION: 98 %

## 2021-11-30 DIAGNOSIS — I87.8 VENOUS STASIS: ICD-10-CM

## 2021-11-30 DIAGNOSIS — E11.49 TYPE II OR UNSPECIFIED TYPE DIABETES MELLITUS WITH NEUROLOGICAL MANIFESTATIONS, NOT STATED AS UNCONTROLLED(250.60) (H): Primary | ICD-10-CM

## 2021-11-30 DIAGNOSIS — L97.321 SKIN ULCER OF LEFT ANKLE, LIMITED TO BREAKDOWN OF SKIN (H): ICD-10-CM

## 2021-11-30 DIAGNOSIS — E11.610 CHARCOT FOOT DUE TO DIABETES MELLITUS (H): ICD-10-CM

## 2021-11-30 DIAGNOSIS — E11.51 DIABETES MELLITUS WITH PERIPHERAL VASCULAR DISEASE (H): ICD-10-CM

## 2021-11-30 DIAGNOSIS — L97.912 ULCER OF RIGHT LOWER EXTREMITY WITH FAT LAYER EXPOSED (H): ICD-10-CM

## 2021-11-30 DIAGNOSIS — S90.415S: ICD-10-CM

## 2021-11-30 PROCEDURE — 99214 OFFICE O/P EST MOD 30 MIN: CPT | Performed by: PODIATRIST

## 2021-11-30 RX ORDER — CLOPIDOGREL BISULFATE 75 MG/1
75 TABLET ORAL EVERY EVENING
Qty: 90 TABLET | Refills: 1 | Status: SHIPPED | OUTPATIENT
Start: 2021-11-30 | End: 2022-05-16

## 2021-11-30 NOTE — NURSING NOTE
Amos Walker's goals for this visit include:   Chief Complaint   Patient presents with     Right Ankle - Follow Up     Left Ankle - Follow Up       He requests these members of his care team be copied on today's visit information: yes    PCP: Racheal Swift    Referring Provider:  No referring provider defined for this encounter.    BP (!) 169/72 (BP Location: Left arm, Patient Position: Chair, Cuff Size: Adult Regular)   Pulse 88   SpO2 98%     Do you need any medication refills at today's visit? No
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
## 2021-12-01 ENCOUNTER — VIRTUAL VISIT (OUTPATIENT)
Dept: TRANSPLANT | Facility: CLINIC | Age: 76
End: 2021-12-01
Attending: INTERNAL MEDICINE
Payer: COMMERCIAL

## 2021-12-01 DIAGNOSIS — D47.2 MGUS (MONOCLONAL GAMMOPATHY OF UNKNOWN SIGNIFICANCE): Primary | ICD-10-CM

## 2021-12-01 PROCEDURE — 99213 OFFICE O/P EST LOW 20 MIN: CPT | Mod: 95 | Performed by: INTERNAL MEDICINE

## 2021-12-01 NOTE — LETTER
12/1/2021         RE: Amos Walker  [address]  [place] [address]        Dear Colleague,    Thank you for referring your patient, Amos Walker, to the [facility] [place]. Please see a copy of my visit note below.    HISTORY OF PRESENT ILLNESS:   Mr. Walker is a 76-year-old gentleman referred for evaluation of anemia and monoclonal gammopathy.  He has a complicated medical history highlighted by type 2 diabetes, hypertension, coronary and peripheral artery disease, Charcot foot, chronic leg ulcers, chronic kidney disease and history of heavy tobacco use.  He has been noted to be anemic for quite a while.  He tells me that he was anemic several years ago when he was in [place].  He had some blood in the stool.  He did have colonoscopies done.  In reviewing his chart, he has had hemoglobins in the low 9/high 8 range on and off since 2017.  Occasionally his hemoglobin gets a little bit higher.  His most recent hemoglobin on 02/05 was 9.0.  He denies any active bleeding now.  He does have foot ulcers.  He has had a lot of problems with infections.  He has had a history of MRSA involving his foot which he feels led ultimately to his Charcot foot and his diabetic ulcers.  He has not had a history of B12 deficiency.  He was noted in 2019 to have a monoclonal peak of 0.2.  There were no light chains in the urine.  His immunoglobulin levels were elevated in IgA and IgM.  His IgA was 604 and IgG was 1890.  There is an immunofixation of the monoclonal peak, which showed a free light chain and kappa chain type in his serum and an IgG kappa also in the serum.  He had a rather severe infection in 01/2020 with sepsis, acute metabolic encephalopathy, respiratory failure secondary to Legionella community-acquired pneumonia.  He was treated aggressively with antibiotics and ultimately was discharged.  He has had issues with type 2 diabetes, on Lantus and Humalog.  He also  has had hypertension.  He sees Dr. Mcclain regularly for his right foot ulcer about every 2 weeks.  He has a Charcot foot.  He denies any fever or chills.  He denies any nausea, vomiting or diarrhea.     INTERVAL HISTORY  Doing well  Charcot foot ulcer healed over and not weeping.Diabetes is better controlled  No COVID sx . He did get a booster COVID Pfizer vaccine no cough Does have a runny nose for past years.    PAST MEDICAL HISTORY:  Remarkable for coronary artery disease, chronic kidney disease, emphysema, heart disease, hyperlipidemia, peripheral artery disease, type 2 diabetes, venous ulcerations, chronic kidney disease, hypertension, atherosclerosis of native arteries of right leg, Charcot foot due to diabetes, angioplasty, a left total hip replacement, a femoral endarterectomy on the right, a cervical disk fusion, a right foot orthopedic surgery, and bilateral cataract surgery.      FAMILY HISTORY:  Remarkable for his father having colon cancer and kidney disease.  Mother had kidney and cardiac issues.  His son had a myocardial infarction at age 40.  His brother has macular degeneration.      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  He smokes 2 packs per day for at least 25 years.  No alcohol use.  He is .  He has 4 children.      MEDICATIONS:  See EMR.      REVIEW OF SYSTEMS:   EYES:  Vision is much better since cataract surgery.   EARS:  Hearing is okay.   RESPIRATORY:  He has a chronic cough.  He feels that he has some postnasal drip.   CARDIAC:  No angina.   GASTROINTESTINAL:  No GERD.  No constipation, no melena, no hematochezia.   GENITOURINARY:  He has ED.  He does not have much in the way of nocturia.   NEUROLOGIC:  He has peripheral neuropathy.  No seizures or stroke.   MUSCULOSKELETAL:  He has the Charcot joint with Charcot foot.   PSYCHIATRIC:  His affect is flat.  He is scheduled to see a psychologist.      ALLERGIES:  Lisinopril, neomycin, methylchloroisothiazolinone,  povidone-iodine.        PHYSICAL EXAMINATION:  PHYSICAL EXAMINATION:  By the video, he is an alert gentleman, verbal, in no acute distress.     EYES:  Grossly normal to inspection, no discharge, erythema or icterus.   SKIN:  Visible skin is clear.  No significant rash or abnormal pigmentation.   NEUROLOGIC:  Cranial nerves seem to be intact.  Mentation and speech is appropriate for age.   PSYCHIATRIC:  Mentation appears normal.  He does not seem anxious today.        Results for RASHEED SORIANO (MRN [id number]) as of 12/1/2021 12:54  Results for RASHEED SORIANO (MRN [id number]) as of 12/1/2021 12:54   Ref. Range 11/23/2021 11:51   WBC Latest Ref Range: 4.0 - 11.0 10e3/uL 4.8   Hemoglobin Latest Ref Range: 13.3 - 17.7 g/dL 12.8 (L)   Hematocrit Latest Ref Range: 40.0 - 53.0 % 39.7 (L)   Platelet Count Latest Ref Range: 150 - 450 10e3/uL 173   RBC Count Latest Ref Range: 4.40 - 5.90 10e6/uL 4.05 (L)   MCV Latest Ref Range: 78 - 100 fL 98   MCH Latest Ref Range: 26.5 - 33.0 pg 31.6   MCHC Latest Ref Range: 31.5 - 36.5 g/dL 32.2   RDW Latest Ref Range: 10.0 - 15.0 % 13.1   % Neutrophils Latest Units: % 63   % Lymphocytes Latest Units: % 23   % Monocytes Latest Units: % 10   % Eosinophils Latest Units: % 4   % Basophils Latest Units: % 1   Absolute Basophils Latest Ref Range: 0.0 - 0.2 10e3/uL 0.0   Absolute Eosinophils Latest Ref Range: 0.0 - 0.7 10e3/uL 0.2   Absolute Lymphocytes Latest Ref Range: 0.8 - 5.3 10e3/uL 1.1   Absolute Monocytes Latest Ref Range: 0.0 - 1.3 10e3/uL 0.5   Absolute Neutrophils Latest Ref Range: 1.6 - 8.3 10e3/uL 3.0   % Retic Latest Ref Range: 0.5 - 2.0 % 1.5   Absolute Retic Latest Ref Range: 0.025 - 0.095 10e6/uL 0.060   Sed Rate Latest Ref Range: 0 - 20 mm/hr 11      Ref. Range 11/23/2021 11:51   Sodium Latest Ref Range: 133 - 144 mmol/L 138   Potassium Latest Ref Range: 3.4 - 5.3 mmol/L 4.5   Chloride Latest Ref Range: 94 - 109 mmol/L 106   Carbon Dioxide Latest Ref Range: 20 - 32 mmol/L 26    Urea Nitrogen Latest Ref Range: 7 - 30 mg/dL 31 (H)   Creatinine Latest Ref Range: 0.66 - 1.25 mg/dL 1.18   GFR Estimate Latest Ref Range: >60 mL/min/1.73m2 60 (L)   Calcium Latest Ref Range: 8.5 - 10.1 mg/dL 8.7   Anion Gap Latest Ref Range: 3 - 14 mmol/L 6   Albumin Latest Ref Range: 3.4 - 5.0 g/dL 3.4   Protein Total Latest Ref Range: 6.8 - 8.8 g/dL 7.3   Bilirubin Total Latest Ref Range: 0.2 - 1.3 mg/dL 0.5   Alkaline Phosphatase Latest Ref Range: 40 - 150 U/L 114   ALT Latest Ref Range: 0 - 70 U/L 12   AST Latest Ref Range: 0 - 45 U/L 17   CRP Inflammation Latest Ref Range: 0.0 - 8.0 mg/L <2.9   Ferritin Latest Ref Range: 26 - 388 ng/mL 182   Iron Latest Ref Range: 35 - 180 ug/dL 90   Iron Binding Cap Latest Ref Range: 240 - 430 ug/dL 247   Iron Saturation Index Latest Ref Range: 15 - 46 % 36   Uric Acid Latest Ref Range: 3.5 - 7.2 mg/dL 5.4     1.  Chronic anemia, likely anemia of chronic inflammation.   2.  Monoclonal gammopathy of uncertain significance.   3.  Type 2 diabetes.   4.  Coronary artery disease.   5.  Peripheral artery disease.   6.  Charcot foot.  7.   Smoking     . His hemoglobin is increased to 12.8  Maybe due to less inflammation in his foot. I explained the MGUS is common in individuals over 70. May be related to his chronic inflammation but no evidence for multiple myeloma , no CRAB criteria really met.Kappa chains are the same as last year. Will repeat labs in 12 mo and see him then Discussed stopping smoking an and continue the Vitamin D  Would advise consider B vitamins (B12 was lowish and antioxidant MVI  RTC 1 year  With labs  I spent a total of 20 minutes on the video and reviewing the charg with Amos Walker during today's office visit. Over 50% of this time was spent counseling the patient and coordinating the care regarding anemia and MGUS    Kaleb Ramirez MD

## 2021-12-01 NOTE — PROGRESS NOTES
Amos is a 76 year old who is being evaluated via a billable video visit.      How would you like to obtain your AVS? MyChart  If the video visit is dropped, the invitation should be resent by: Send to e-mail at: [email or web address]  Will anyone else be joining your video visit? No        HISTORY OF PRESENT ILLNESS:   Mr. Walker is a 76-year-old gentleman referred for evaluation of anemia and monoclonal gammopathy.  He has a complicated medical history highlighted by type 2 diabetes, hypertension, coronary and peripheral artery disease, Charcot foot, chronic leg ulcers, chronic kidney disease and history of heavy tobacco use.  He has been noted to be anemic for quite a while.  He tells me that he was anemic several years ago when he was in [place].  He had some blood in the stool.  He did have colonoscopies done.  In reviewing his chart, he has had hemoglobins in the low 9/high 8 range on and off since 2017.  Occasionally his hemoglobin gets a little bit higher.  His most recent hemoglobin on 02/05 was 9.0.  He denies any active bleeding now.  He does have foot ulcers.  He has had a lot of problems with infections.  He has had a history of MRSA involving his foot which he feels led ultimately to his Charcot foot and his diabetic ulcers.  He has not had a history of B12 deficiency.  He was noted in 2019 to have a monoclonal peak of 0.2.  There were no light chains in the urine.  His immunoglobulin levels were elevated in IgA and IgM.  His IgA was 604 and IgG was 1890.  There is an immunofixation of the monoclonal peak, which showed a free light chain and kappa chain type in his serum and an IgG kappa also in the serum.  He had a rather severe infection in 01/2020 with sepsis, acute metabolic encephalopathy, respiratory failure secondary to Legionella community-acquired pneumonia.  He was treated aggressively with antibiotics and ultimately was discharged.  He has had issues with type 2 diabetes, on Lantus and Humalog.   He also has had hypertension.  He sees Dr. Mcclain regularly for his right foot ulcer about every 2 weeks.  He has a Charcot foot.  He denies any fever or chills.  He denies any nausea, vomiting or diarrhea.     INTERVAL HISTORY  Doing well  Charcot foot ulcer healed over and not weeping.Diabetes is better controlled  No COVID sx . He did get a booster COVID Pfizer vaccine no cough Does have a runny nose for past years.    PAST MEDICAL HISTORY:  Remarkable for coronary artery disease, chronic kidney disease, emphysema, heart disease, hyperlipidemia, peripheral artery disease, type 2 diabetes, venous ulcerations, chronic kidney disease, hypertension, atherosclerosis of native arteries of right leg, Charcot foot due to diabetes, angioplasty, a left total hip replacement, a femoral endarterectomy on the right, a cervical disk fusion, a right foot orthopedic surgery, and bilateral cataract surgery.      FAMILY HISTORY:  Remarkable for his father having colon cancer and kidney disease.  Mother had kidney and cardiac issues.  His son had a myocardial infarction at age 40.  His brother has macular degeneration.      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  He smokes 2 packs per day for at least 25 years.  No alcohol use.  He is .  He has 4 children.      MEDICATIONS:  See EMR.      REVIEW OF SYSTEMS:   EYES:  Vision is much better since cataract surgery.   EARS:  Hearing is okay.   RESPIRATORY:  He has a chronic cough.  He feels that he has some postnasal drip.   CARDIAC:  No angina.   GASTROINTESTINAL:  No GERD.  No constipation, no melena, no hematochezia.   GENITOURINARY:  He has ED.  He does not have much in the way of nocturia.   NEUROLOGIC:  He has peripheral neuropathy.  No seizures or stroke.   MUSCULOSKELETAL:  He has the Charcot joint with Charcot foot.   PSYCHIATRIC:  His affect is flat.  He is scheduled to see a psychologist.      ALLERGIES:  Lisinopril, neomycin,  methylchloroisothiazolinone, povidone-iodine.        PHYSICAL EXAMINATION:  PHYSICAL EXAMINATION:  By the video, he is an alert gentleman, verbal, in no acute distress.     EYES:  Grossly normal to inspection, no discharge, erythema or icterus.   SKIN:  Visible skin is clear.  No significant rash or abnormal pigmentation.   NEUROLOGIC:  Cranial nerves seem to be intact.  Mentation and speech is appropriate for age.   PSYCHIATRIC:  Mentation appears normal.  He does not seem anxious today.        Results for RASHEED SORIANO (MRN [id number]) as of 12/1/2021 12:54  Results for RASHEED SORIANO (MRN [id number]) as of 12/1/2021 12:54   Ref. Range 11/23/2021 11:51   WBC Latest Ref Range: 4.0 - 11.0 10e3/uL 4.8   Hemoglobin Latest Ref Range: 13.3 - 17.7 g/dL 12.8 (L)   Hematocrit Latest Ref Range: 40.0 - 53.0 % 39.7 (L)   Platelet Count Latest Ref Range: 150 - 450 10e3/uL 173   RBC Count Latest Ref Range: 4.40 - 5.90 10e6/uL 4.05 (L)   MCV Latest Ref Range: 78 - 100 fL 98   MCH Latest Ref Range: 26.5 - 33.0 pg 31.6   MCHC Latest Ref Range: 31.5 - 36.5 g/dL 32.2   RDW Latest Ref Range: 10.0 - 15.0 % 13.1   % Neutrophils Latest Units: % 63   % Lymphocytes Latest Units: % 23   % Monocytes Latest Units: % 10   % Eosinophils Latest Units: % 4   % Basophils Latest Units: % 1   Absolute Basophils Latest Ref Range: 0.0 - 0.2 10e3/uL 0.0   Absolute Eosinophils Latest Ref Range: 0.0 - 0.7 10e3/uL 0.2   Absolute Lymphocytes Latest Ref Range: 0.8 - 5.3 10e3/uL 1.1   Absolute Monocytes Latest Ref Range: 0.0 - 1.3 10e3/uL 0.5   Absolute Neutrophils Latest Ref Range: 1.6 - 8.3 10e3/uL 3.0   % Retic Latest Ref Range: 0.5 - 2.0 % 1.5   Absolute Retic Latest Ref Range: 0.025 - 0.095 10e6/uL 0.060   Sed Rate Latest Ref Range: 0 - 20 mm/hr 11      Ref. Range 11/23/2021 11:51   Sodium Latest Ref Range: 133 - 144 mmol/L 138   Potassium Latest Ref Range: 3.4 - 5.3 mmol/L 4.5   Chloride Latest Ref Range: 94 - 109 mmol/L 106   Carbon Dioxide Latest  Ref Range: 20 - 32 mmol/L 26   Urea Nitrogen Latest Ref Range: 7 - 30 mg/dL 31 (H)   Creatinine Latest Ref Range: 0.66 - 1.25 mg/dL 1.18   GFR Estimate Latest Ref Range: >60 mL/min/1.73m2 60 (L)   Calcium Latest Ref Range: 8.5 - 10.1 mg/dL 8.7   Anion Gap Latest Ref Range: 3 - 14 mmol/L 6   Albumin Latest Ref Range: 3.4 - 5.0 g/dL 3.4   Protein Total Latest Ref Range: 6.8 - 8.8 g/dL 7.3   Bilirubin Total Latest Ref Range: 0.2 - 1.3 mg/dL 0.5   Alkaline Phosphatase Latest Ref Range: 40 - 150 U/L 114   ALT Latest Ref Range: 0 - 70 U/L 12   AST Latest Ref Range: 0 - 45 U/L 17   CRP Inflammation Latest Ref Range: 0.0 - 8.0 mg/L <2.9   Ferritin Latest Ref Range: 26 - 388 ng/mL 182   Iron Latest Ref Range: 35 - 180 ug/dL 90   Iron Binding Cap Latest Ref Range: 240 - 430 ug/dL 247   Iron Saturation Index Latest Ref Range: 15 - 46 % 36   Uric Acid Latest Ref Range: 3.5 - 7.2 mg/dL 5.4     1.  Chronic anemia, likely anemia of chronic inflammation.   2.  Monoclonal gammopathy of uncertain significance.   3.  Type 2 diabetes.   4.  Coronary artery disease.   5.  Peripheral artery disease.   6.  Charcot foot.  7.   Smoking     . His hemoglobin is increased to 12.8  Maybe due to less inflammation in his foot. I explained the MGUS is common in individuals over 70. May be related to his chronic inflammation but no evidence for multiple myeloma , no CRAB criteria really met.Kappa chains are the same as last year. Will repeat labs in 12 mo and see him then Discussed stopping smoking an and continue the Vitamin D  Would advise consider B vitamins (B12 was lowish and antioxidant MVI  RTC 1 year  With labs  I spent a total of 20 minutes on the video and reviewing the charg with Amos Walker during today's office visit. Over 50% of this time was spent counseling the patient and coordinating the care regarding anemia and MGUS  Kaleb Ramirez MD         Video Start Time: 1255  Video-Visit Details    Type of service:  Video  Visit    Video End Time: 115    Originating Location (pt. Location): home    Distant Location (provider location):  [facility]LO[facility] PRO[facility] [place]     Platform used for Video Visit: rachel

## 2021-12-04 ENCOUNTER — MYC MEDICAL ADVICE (OUTPATIENT)
Dept: INTERNAL MEDICINE | Facility: CLINIC | Age: 76
End: 2021-12-04
Payer: COMMERCIAL

## 2021-12-04 DIAGNOSIS — I10 ESSENTIAL HYPERTENSION: ICD-10-CM

## 2021-12-04 DIAGNOSIS — E11.51 DIABETES MELLITUS WITH PERIPHERAL VASCULAR DISEASE (H): ICD-10-CM

## 2021-12-06 ENCOUNTER — TELEPHONE (OUTPATIENT)
Dept: CARE COORDINATION | Facility: CLINIC | Age: 76
End: 2021-12-06
Payer: COMMERCIAL

## 2021-12-06 RX ORDER — LISINOPRIL 2.5 MG/1
10 TABLET ORAL DAILY
Qty: 360 TABLET | Refills: 1 | Status: CANCELLED | OUTPATIENT
Start: 2021-12-06

## 2021-12-07 RX ORDER — INSULIN LISPRO 100 [IU]/ML
INJECTION, SOLUTION INTRAVENOUS; SUBCUTANEOUS
Qty: 15 ML | Refills: 1 | Status: SHIPPED | OUTPATIENT
Start: 2021-12-07 | End: 2022-07-12

## 2021-12-08 ENCOUNTER — OFFICE VISIT (OUTPATIENT)
Dept: PODIATRY | Facility: CLINIC | Age: 76
End: 2021-12-08
Payer: COMMERCIAL

## 2021-12-08 VITALS — OXYGEN SATURATION: 100 % | SYSTOLIC BLOOD PRESSURE: 163 MMHG | DIASTOLIC BLOOD PRESSURE: 75 MMHG | HEART RATE: 102 BPM

## 2021-12-08 DIAGNOSIS — I87.8 VENOUS STASIS: ICD-10-CM

## 2021-12-08 DIAGNOSIS — L97.912 ULCER OF RIGHT LOWER EXTREMITY WITH FAT LAYER EXPOSED (H): ICD-10-CM

## 2021-12-08 DIAGNOSIS — S90.425D BLISTER OF TOE OF LEFT FOOT, SUBSEQUENT ENCOUNTER: ICD-10-CM

## 2021-12-08 DIAGNOSIS — E11.49 TYPE II OR UNSPECIFIED TYPE DIABETES MELLITUS WITH NEUROLOGICAL MANIFESTATIONS, NOT STATED AS UNCONTROLLED(250.60) (H): Primary | ICD-10-CM

## 2021-12-08 DIAGNOSIS — L97.321 SKIN ULCER OF LEFT ANKLE, LIMITED TO BREAKDOWN OF SKIN (H): ICD-10-CM

## 2021-12-08 DIAGNOSIS — L84 TYLOMA: ICD-10-CM

## 2021-12-08 DIAGNOSIS — E11.51 DIABETES MELLITUS WITH PERIPHERAL VASCULAR DISEASE (H): ICD-10-CM

## 2021-12-08 DIAGNOSIS — E11.610 CHARCOT FOOT DUE TO DIABETES MELLITUS (H): ICD-10-CM

## 2021-12-08 PROCEDURE — 99214 OFFICE O/P EST MOD 30 MIN: CPT | Performed by: PODIATRIST

## 2021-12-08 RX ORDER — CEPHALEXIN 500 MG/1
500 CAPSULE ORAL 2 TIMES DAILY
Qty: 28 CAPSULE | Refills: 0 | Status: SHIPPED | OUTPATIENT
Start: 2021-12-08 | End: 2021-12-22

## 2021-12-08 NOTE — NURSING NOTE
Amos Walker's chief complaint for this visit includes:  Chief Complaint   Patient presents with     RECHECK     Left ankle, right leg     PCP: Racheal Swift    Referring Provider:  No referring provider defined for this encounter.    BP (!) 163/75   Pulse 102   SpO2 100%   Data Unavailable     Do you need any medication refills at today's visit?
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
## 2021-12-08 NOTE — PATIENT INSTRUCTIONS
Thanks for coming today.  Ortho/Sports Medicine Clinic  [address] [place] [address]    To schedule future appointments in Ortho Clinic, you may call [phone].    To schedule ordered imaging by your Provider: Call [facility] at [phone]    [employer] available online at:   [email or web address]    Please call if any further questions or concerns [phone] and ask for the Orthopedic Department. Clinic hours 8 am to 5 pm.    Return to clinic if symptoms worsen.

## 2021-12-15 ENCOUNTER — OFFICE VISIT (OUTPATIENT)
Dept: AUDIOLOGY | Facility: CLINIC | Age: 76
End: 2021-12-15
Payer: COMMERCIAL

## 2021-12-15 ENCOUNTER — OFFICE VISIT (OUTPATIENT)
Dept: PODIATRY | Facility: CLINIC | Age: 76
End: 2021-12-15
Payer: COMMERCIAL

## 2021-12-15 DIAGNOSIS — S90.415S: ICD-10-CM

## 2021-12-15 DIAGNOSIS — H90.3 SENSORY HEARING LOSS, BILATERAL: Primary | ICD-10-CM

## 2021-12-15 DIAGNOSIS — E11.51 DIABETES MELLITUS WITH PERIPHERAL VASCULAR DISEASE (H): ICD-10-CM

## 2021-12-15 DIAGNOSIS — I87.8 VENOUS STASIS: ICD-10-CM

## 2021-12-15 DIAGNOSIS — L97.321 SKIN ULCER OF LEFT ANKLE, LIMITED TO BREAKDOWN OF SKIN (H): ICD-10-CM

## 2021-12-15 DIAGNOSIS — E11.49 TYPE II OR UNSPECIFIED TYPE DIABETES MELLITUS WITH NEUROLOGICAL MANIFESTATIONS, NOT STATED AS UNCONTROLLED(250.60) (H): Primary | ICD-10-CM

## 2021-12-15 DIAGNOSIS — L97.912 ULCER OF RIGHT LOWER EXTREMITY WITH FAT LAYER EXPOSED (H): ICD-10-CM

## 2021-12-15 DIAGNOSIS — E11.610 CHARCOT FOOT DUE TO DIABETES MELLITUS (H): ICD-10-CM

## 2021-12-15 PROCEDURE — V5264 EAR MOLD/INSERT: HCPCS | Performed by: AUDIOLOGIST

## 2021-12-15 PROCEDURE — 99214 OFFICE O/P EST MOD 30 MIN: CPT | Performed by: PODIATRIST
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
## 2021-12-15 NOTE — PATIENT INSTRUCTIONS
Thanks for coming today.  Ortho/Sports Medicine Clinic  [address] [place] [address]    To schedule future appointments in Ortho Clinic, you may call [phone].    To schedule ordered imaging by your provider:   Call [phone]    To schedule an injection ordered by your provider:  Call [facility] Injection scheduling line: [phone]  [facility] available online at:  [email or web address]    Please call if any further questions or concerns ([phone]).  Clinic hours 8 am to 5 pm.    Return to clinic (call) if symptoms worsen or fail to improve.
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
## 2021-12-16 ENCOUNTER — TELEPHONE (OUTPATIENT)
Dept: VASCULAR SURGERY | Facility: CLINIC | Age: 76
End: 2021-12-16
Payer: COMMERCIAL

## 2021-12-16 ENCOUNTER — TELEPHONE (OUTPATIENT)
Dept: VASCULAR SURGERY | Facility: CLINIC | Age: 76
End: 2021-12-16

## 2021-12-16 NOTE — TELEPHONE ENCOUNTER
Attempted to call Amos to see how he is doing s/p ASA discontinuation at last vascular appt. I was unable to reach him but was able to speak to his wife. She will have pt call me back with an update once he is home in the next couple hours.    ALEXSANDRA CruzN, RN  [facility]  Ph: [phone]  Fax: [phone]

## 2021-12-17 NOTE — TELEPHONE ENCOUNTER
"I contacted pt to discuss how he is doing since discontinuing his ASA at last vascular appt. Pt states he felt \"not right\" when he stopped the aspirin, so he started taking 81mg daily again. He states he had worsening discomfort from the hips down. He said this discomfort has resolved since taking daily aspirin again. He has no concerns at this time.    I will update Dr. Lyons.    JOSE Cruz, RN  [facility]  Ph: [phone]  Fax: [phone]    "

## 2021-12-22 ENCOUNTER — OFFICE VISIT (OUTPATIENT)
Dept: PODIATRY | Facility: CLINIC | Age: 76
End: 2021-12-22
Payer: COMMERCIAL

## 2021-12-22 VITALS — SYSTOLIC BLOOD PRESSURE: 152 MMHG | OXYGEN SATURATION: 100 % | HEART RATE: 81 BPM | DIASTOLIC BLOOD PRESSURE: 74 MMHG

## 2021-12-22 DIAGNOSIS — L97.321 SKIN ULCER OF LEFT ANKLE, LIMITED TO BREAKDOWN OF SKIN (H): ICD-10-CM

## 2021-12-22 DIAGNOSIS — E11.51 DIABETES MELLITUS WITH PERIPHERAL VASCULAR DISEASE (H): ICD-10-CM

## 2021-12-22 DIAGNOSIS — I87.8 VENOUS STASIS: ICD-10-CM

## 2021-12-22 DIAGNOSIS — L97.912 ULCER OF RIGHT LOWER EXTREMITY WITH FAT LAYER EXPOSED (H): ICD-10-CM

## 2021-12-22 DIAGNOSIS — S90.425D BLISTER OF TOE OF LEFT FOOT, SUBSEQUENT ENCOUNTER: ICD-10-CM

## 2021-12-22 DIAGNOSIS — E11.49 TYPE II OR UNSPECIFIED TYPE DIABETES MELLITUS WITH NEUROLOGICAL MANIFESTATIONS, NOT STATED AS UNCONTROLLED(250.60) (H): Primary | ICD-10-CM

## 2021-12-22 PROCEDURE — 99214 OFFICE O/P EST MOD 30 MIN: CPT | Performed by: PODIATRIST

## 2021-12-22 RX ORDER — CEPHALEXIN 500 MG/1
500 CAPSULE ORAL 2 TIMES DAILY
Qty: 28 CAPSULE | Refills: 0 | Status: SHIPPED | OUTPATIENT
Start: 2021-12-22 | End: 2022-01-05

## 2021-12-22 ASSESSMENT — PAIN SCALES - GENERAL: PAINLEVEL: NO PAIN (0)

## 2021-12-22 NOTE — NURSING NOTE
Amos Walker's chief complaint for this visit includes:  Chief Complaint   Patient presents with     RECHECK     right anterior ankle ulcer/left medial ankle ulcer     PCP: Racheal Swift    Referring Provider:  No referring provider defined for this encounter.    BP (!) 152/74   Pulse 81   SpO2 100%   No Pain (0)     Do you need any medication refills at today's visit? YES antibiotic    Allergies   Allergen Reactions     Seasonal Allergies      Lisinopril Dizziness     Methylchloroisothiazolinone [Methylisothiazolinone] Rash     Neomycin      Wound gets worse     Povidone Iodine Rash       Maya Vidal, CMA
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
## 2021-12-22 NOTE — PATIENT INSTRUCTIONS
Thanks for coming today.  [facility]  [address] [place] [address]    To schedule future appointments in Ortho Clinic, you may call [phone].    To schedule ordered imaging by your provider:   Call [facility] [phone]    To schedule an injection ordered by your provider:  Call [facility] Injection scheduling line: [phone]  [facility] available online at:  [email or web address]    Please call if any further questions or concerns ([phone]).  Clinic hours 8 am to 5 pm.    Return to clinic (call) if symptoms worsen or fail to improve.
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
## 2022-01-03 ENCOUNTER — MYC MEDICAL ADVICE (OUTPATIENT)
Dept: INTERNAL MEDICINE | Facility: CLINIC | Age: 77
End: 2022-01-03
Payer: COMMERCIAL

## 2022-01-03 DIAGNOSIS — E11.40 TYPE 2 DIABETES, CONTROLLED, WITH NEUROPATHY (H): ICD-10-CM

## 2022-01-04 RX ORDER — DULAGLUTIDE 1.5 MG/.5ML
1.5 INJECTION, SOLUTION SUBCUTANEOUS
Qty: 6 ML | Refills: 1 | Status: SHIPPED | OUTPATIENT
Start: 2022-01-04 | End: 2022-07-12

## 2022-01-05 ENCOUNTER — OFFICE VISIT (OUTPATIENT)
Dept: PODIATRY | Facility: CLINIC | Age: 77
End: 2022-01-05
Payer: COMMERCIAL

## 2022-01-05 VITALS — HEART RATE: 100 BPM | OXYGEN SATURATION: 99 % | SYSTOLIC BLOOD PRESSURE: 166 MMHG | DIASTOLIC BLOOD PRESSURE: 74 MMHG

## 2022-01-05 DIAGNOSIS — E11.610 CHARCOT FOOT DUE TO DIABETES MELLITUS (H): ICD-10-CM

## 2022-01-05 DIAGNOSIS — S90.425D BLISTER OF TOE OF LEFT FOOT, SUBSEQUENT ENCOUNTER: ICD-10-CM

## 2022-01-05 DIAGNOSIS — L97.912 ULCER OF RIGHT LOWER EXTREMITY WITH FAT LAYER EXPOSED (H): ICD-10-CM

## 2022-01-05 DIAGNOSIS — E11.51 DIABETES MELLITUS WITH PERIPHERAL VASCULAR DISEASE (H): ICD-10-CM

## 2022-01-05 DIAGNOSIS — I87.8 VENOUS STASIS: ICD-10-CM

## 2022-01-05 DIAGNOSIS — E11.49 TYPE II OR UNSPECIFIED TYPE DIABETES MELLITUS WITH NEUROLOGICAL MANIFESTATIONS, NOT STATED AS UNCONTROLLED(250.60) (H): Primary | ICD-10-CM

## 2022-01-05 PROCEDURE — 99214 OFFICE O/P EST MOD 30 MIN: CPT | Performed by: PODIATRIST

## 2022-01-05 RX ORDER — CEPHALEXIN 500 MG/1
500 CAPSULE ORAL 2 TIMES DAILY
Qty: 28 CAPSULE | Refills: 0 | Status: SHIPPED | OUTPATIENT
Start: 2022-01-05 | End: 2023-02-03
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
## 2022-01-05 NOTE — NURSING NOTE
Amos Walker's goals for this visit include:   Chief Complaint   Patient presents with     Left Ankle - Follow Up     Right Ankle - Follow Up       He requests these members of his care team be copied on today's visit information: yes    PCP: Racheal Swift    Referring Provider:  No referring provider defined for this encounter.    BP (!) 166/74 (BP Location: Right arm, Patient Position: Chair, Cuff Size: Adult Regular)   Pulse 100   SpO2 99%     Do you need any medication refills at today's visit? No    Verna FITZGERALD MA   [facility]
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
## 2022-01-10 ENCOUNTER — OFFICE VISIT (OUTPATIENT)
Dept: INTERNAL MEDICINE | Facility: CLINIC | Age: 77
End: 2022-01-10
Payer: COMMERCIAL

## 2022-01-10 VITALS
DIASTOLIC BLOOD PRESSURE: 74 MMHG | SYSTOLIC BLOOD PRESSURE: 134 MMHG | HEART RATE: 95 BPM | OXYGEN SATURATION: 100 % | WEIGHT: 171.2 LBS | HEIGHT: 75 IN | BODY MASS INDEX: 21.29 KG/M2

## 2022-01-10 DIAGNOSIS — E11.40 TYPE 2 DIABETES, CONTROLLED, WITH NEUROPATHY (H): Primary | ICD-10-CM

## 2022-01-10 DIAGNOSIS — I10 ESSENTIAL HYPERTENSION: ICD-10-CM

## 2022-01-10 DIAGNOSIS — I83.003 VENOUS STASIS ULCER OF ANKLE, UNSPECIFIED LATERALITY, UNSPECIFIED ULCER STAGE, UNSPECIFIED WHETHER VARICOSE VEINS PRESENT (H): ICD-10-CM

## 2022-01-10 DIAGNOSIS — Z20.822 EXPOSURE TO 2019 NOVEL CORONAVIRUS: ICD-10-CM

## 2022-01-10 DIAGNOSIS — N18.30 STAGE 3 CHRONIC KIDNEY DISEASE, UNSPECIFIED WHETHER STAGE 3A OR 3B CKD (H): ICD-10-CM

## 2022-01-10 DIAGNOSIS — J43.2 CENTRILOBULAR EMPHYSEMA (H): ICD-10-CM

## 2022-01-10 DIAGNOSIS — L97.309 VENOUS STASIS ULCER OF ANKLE, UNSPECIFIED LATERALITY, UNSPECIFIED ULCER STAGE, UNSPECIFIED WHETHER VARICOSE VEINS PRESENT (H): ICD-10-CM

## 2022-01-10 LAB — HBA1C MFR BLD: 6.1 % (ref 0–5.7)

## 2022-01-10 PROCEDURE — 83036 HEMOGLOBIN GLYCOSYLATED A1C: CPT | Performed by: INTERNAL MEDICINE

## 2022-01-10 PROCEDURE — 99214 OFFICE O/P EST MOD 30 MIN: CPT | Mod: CS | Performed by: INTERNAL MEDICINE

## 2022-01-10 RX ORDER — LISINOPRIL 2.5 MG/1
5-7.5 TABLET ORAL DAILY
Qty: 270 TABLET | Refills: 3 | Status: SHIPPED | OUTPATIENT
Start: 2022-01-10 | End: 2023-02-03

## 2022-01-10 ASSESSMENT — PAIN SCALES - GENERAL: PAINLEVEL: NO PAIN (0)

## 2022-01-10 ASSESSMENT — MIFFLIN-ST. JEOR: SCORE: 1594.25
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
## 2022-01-10 NOTE — NURSING NOTE
Chief Complaint   Patient presents with     Recheck Medication       MORE Cornell at 2:31 PM on 1/10/2022.

## 2022-01-10 NOTE — PATIENT INSTRUCTIONS
"Discharge Instructions for COVID-19 Patients  You have--or may have--COVID-19. Please follow the instructions listed below.   If you have a weakened immune system, discuss with your doctor any other actions you need to take.  How can I protect others?  If you have symptoms (fever, cough, body aches or trouble breathing):    Stay home and away from others (self-isolate) until:  ? Your other symptoms have resolved (gotten better). And   ? You've had no fever--and no medicine that reduces fever--for 1 full day (24 hours). And   ? At least 10 days have passed since your symptoms started. (You may need to wait 20 days. Follow the advice of your care team.)  If you don't show symptoms, but testing showed that you have COVID-19:    Stay home and away from others (self-isolate) until at least 10 days have passed since the date of your first positive COVID-19 test.  During this time    Stay in your own room, even for meals. Use your own bathroom if you can.    Stay away from others in your home. No hugging, kissing or shaking hands. No visitors.    Don't go to work, school or anywhere else.    Clean \"high touch\" surfaces often (doorknobs, counters, handles). Use household cleaning spray or wipes.    You'll find a full list of  on the EPA website: www.epa.gov/pesticide-registration/list-n-disinfectants-use-against-sars-cov-2.    Cover your mouth and nose with a mask or other face covering to avoid spreading germs.    Wash your hands and face often. Use soap and water.    Caregivers in these groups are at risk for severe illness due to COVID-19:  ? People 65 years and older  ? People who live in a nursing home or long-term care facility  ? People with chronic disease (lung, heart, cancer, diabetes, kidney, liver, immunologic)  ? People who have a weakened immune system, including those who:    Are in cancer treatment    Take medicine that weakens the immune system, such as corticosteroids    Had a bone marrow or organ " transplant    Have an immune deficiency    Have poorly controlled HIV or AIDS    Are obese (body mass index of 40 or higher)    Smoke regularly    Caregivers should wear gloves while washing dishes, handling laundry and cleaning bedrooms and bathrooms.    Use caution when washing and drying laundry: Don't shake dirty laundry and use the warmest water setting that you can.    For more tips on managing your health at home, go to www.cdc.gov/coronavirus/2019-ncov/downloads/10Things.pdf.  How can I take care of myself at home?  1. Get lots of rest. Drink extra fluids (unless a doctor has told you not to).  2. Take Tylenol (acetaminophen) for fever or pain. If you have liver or kidney problems, ask your family doctor if it's okay to take Tylenol.   Adults can take either:   ? 650 mg (two 325 mg pills) every 4 to 6 hours, or   ? 1,000 mg (two 500 mg pills) every 8 hours as needed.  ? Note: Don't take more than 3,000 mg in one day. Acetaminophen is found in many medicines (both prescribed and over-the-counter medicines). Read all labels to be sure you don't take too much.   For children, check the Tylenol bottle for the right dose. The dose is based on the child's age or weight.  3. If you have other health problems (like cancer, heart failure, an organ transplant or severe kidney disease): Call your specialty clinic if you don't feel better in the next 2 days.  4. Know when to call 911. Emergency warning signs include:  ? Trouble breathing or shortness of breath  ? Pain or pressure in the chest that doesn't go away  ? Feeling confused like you haven't felt before, or not being able to wake up  ? Bluish-colored lips or face  5. Your doctor may have prescribed a blood thinner medicine. Follow their instructions.  Where can I get more information?     [employer] - About COVID-19:   [email or web address]    CDC - What to Do If You're Sick:  www.cdc.gov/coronavirus/2019-ncov/about/steps-when-sick.html    CDC - Ending Home Isolation: www.cdc.gov/coronavirus/2019-ncov/hcp/disposition-in-home-patients.html    CDC - Caring for Someone: www.cdc.gov/coronavirus/2019-ncov/if-you-are-sick/care-for-someone.html    [facility] - Interim Guidance for Hospital Discharge to Home: www.health.[facility].[place]./diseases/coronavirus/hcp/hospdischarge.pdf    Below are the COVID-19 hotlines at the [place] Department of Health ([facility]). Interpreters are available.  ? For health questions: Call 786-252-8072 or 1-525.412.8353 (7 a.m. to 7 p.m.)  ? For questions about schools and childcare: Call 919-295-5161 or 1-443.741.6627 (7 a.m. to 7 p.m.)    For informational purposes only. Not to replace the advice of your health care provider. Clinically reviewed by Dr. Hua Meredith.   Copyright   2020 [facility]. All rights reserved. [employer] 620534 - REV 01/05/21.

## 2022-01-12 ENCOUNTER — OFFICE VISIT (OUTPATIENT)
Dept: PODIATRY | Facility: CLINIC | Age: 77
End: 2022-01-12
Payer: COMMERCIAL

## 2022-01-12 VITALS — DIASTOLIC BLOOD PRESSURE: 64 MMHG | SYSTOLIC BLOOD PRESSURE: 119 MMHG | OXYGEN SATURATION: 99 % | HEART RATE: 98 BPM

## 2022-01-12 DIAGNOSIS — L97.321 SKIN ULCER OF LEFT ANKLE, LIMITED TO BREAKDOWN OF SKIN (H): ICD-10-CM

## 2022-01-12 DIAGNOSIS — I87.8 VENOUS STASIS: ICD-10-CM

## 2022-01-12 DIAGNOSIS — E11.610 CHARCOT FOOT DUE TO DIABETES MELLITUS (H): ICD-10-CM

## 2022-01-12 DIAGNOSIS — E11.49 TYPE II OR UNSPECIFIED TYPE DIABETES MELLITUS WITH NEUROLOGICAL MANIFESTATIONS, NOT STATED AS UNCONTROLLED(250.60) (H): Primary | ICD-10-CM

## 2022-01-12 DIAGNOSIS — E11.51 DIABETES MELLITUS WITH PERIPHERAL VASCULAR DISEASE (H): ICD-10-CM

## 2022-01-12 DIAGNOSIS — L97.912 ULCER OF RIGHT LOWER EXTREMITY WITH FAT LAYER EXPOSED (H): ICD-10-CM

## 2022-01-12 PROCEDURE — 99214 OFFICE O/P EST MOD 30 MIN: CPT | Performed by: PODIATRIST
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
## 2022-01-12 NOTE — NURSING NOTE
Amos Walker's chief complaint for this visit includes:  Chief Complaint   Patient presents with     RECHECK     Ulcer follow up     PCP: Racheal Swift    Referring Provider:  No referring provider defined for this encounter.    /64   Pulse 98   SpO2 99%   Data Unavailable     Do you need any medication refills at today's visit? NO    Allergies   Allergen Reactions     Seasonal Allergies      Lisinopril Dizziness     Methylchloroisothiazolinone [Methylisothiazolinone] Rash     Neomycin      Wound gets worse     Povidone Iodine Rash       Paola Laws, ATC
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
## 2022-01-19 ENCOUNTER — OFFICE VISIT (OUTPATIENT)
Dept: PODIATRY | Facility: CLINIC | Age: 77
End: 2022-01-19
Payer: COMMERCIAL

## 2022-01-19 DIAGNOSIS — E11.51 DIABETES MELLITUS WITH PERIPHERAL VASCULAR DISEASE (H): ICD-10-CM

## 2022-01-19 DIAGNOSIS — I87.8 VENOUS STASIS: ICD-10-CM

## 2022-01-19 DIAGNOSIS — E11.610 CHARCOT FOOT DUE TO DIABETES MELLITUS (H): ICD-10-CM

## 2022-01-19 DIAGNOSIS — L97.912 ULCER OF RIGHT LOWER EXTREMITY WITH FAT LAYER EXPOSED (H): ICD-10-CM

## 2022-01-19 DIAGNOSIS — L97.321 SKIN ULCER OF LEFT ANKLE, LIMITED TO BREAKDOWN OF SKIN (H): ICD-10-CM

## 2022-01-19 DIAGNOSIS — S90.425D BLISTER OF TOE OF LEFT FOOT, SUBSEQUENT ENCOUNTER: ICD-10-CM

## 2022-01-19 DIAGNOSIS — E11.49 TYPE II OR UNSPECIFIED TYPE DIABETES MELLITUS WITH NEUROLOGICAL MANIFESTATIONS, NOT STATED AS UNCONTROLLED(250.60) (H): Primary | ICD-10-CM

## 2022-01-19 PROCEDURE — 99214 OFFICE O/P EST MOD 30 MIN: CPT | Performed by: PODIATRIST

## 2022-01-19 NOTE — NURSING NOTE
Amos Walker's chief complaint for this visit includes:  Chief Complaint   Patient presents with     Follow Up     bilateral ankle uclers     PCP: Racheal Swift    Referring Provider:  No referring provider defined for this encounter.    There were no vitals taken for this visit.  Data Unavailable     Do you need any medication refills at today's visit?
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
## 2022-01-26 ENCOUNTER — OFFICE VISIT (OUTPATIENT)
Dept: PODIATRY | Facility: CLINIC | Age: 77
End: 2022-01-26
Payer: COMMERCIAL

## 2022-01-26 DIAGNOSIS — L97.321 SKIN ULCER OF LEFT ANKLE, LIMITED TO BREAKDOWN OF SKIN (H): ICD-10-CM

## 2022-01-26 DIAGNOSIS — Z87.2 HISTORY OF ULCER OF LOWER EXTREMITY: ICD-10-CM

## 2022-01-26 DIAGNOSIS — I87.8 VENOUS STASIS: ICD-10-CM

## 2022-01-26 DIAGNOSIS — E11.610 CHARCOT FOOT DUE TO DIABETES MELLITUS (H): ICD-10-CM

## 2022-01-26 DIAGNOSIS — E11.51 DIABETES MELLITUS WITH PERIPHERAL VASCULAR DISEASE (H): ICD-10-CM

## 2022-01-26 DIAGNOSIS — E11.49 TYPE II OR UNSPECIFIED TYPE DIABETES MELLITUS WITH NEUROLOGICAL MANIFESTATIONS, NOT STATED AS UNCONTROLLED(250.60) (H): Primary | ICD-10-CM

## 2022-01-26 PROCEDURE — 99214 OFFICE O/P EST MOD 30 MIN: CPT | Performed by: PODIATRIST

## 2022-01-26 NOTE — NURSING NOTE
Amos Walker's chief complaint for this visit includes:  Chief Complaint   Patient presents with     RECHECK     Right and Left ankle ulcers     PCP: Racheal Swift    Referring Provider:  No referring provider defined for this encounter.    There were no vitals taken for this visit.  Data Unavailable     Do you need any medication refills at today's visit?
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
## 2022-02-08 ENCOUNTER — OFFICE VISIT (OUTPATIENT)
Dept: PODIATRY | Facility: CLINIC | Age: 77
End: 2022-02-08
Payer: COMMERCIAL

## 2022-02-08 DIAGNOSIS — E11.610 CHARCOT FOOT DUE TO DIABETES MELLITUS (H): ICD-10-CM

## 2022-02-08 DIAGNOSIS — I87.8 VENOUS STASIS: ICD-10-CM

## 2022-02-08 DIAGNOSIS — L97.321 SKIN ULCER OF LEFT ANKLE, LIMITED TO BREAKDOWN OF SKIN (H): ICD-10-CM

## 2022-02-08 DIAGNOSIS — E11.51 DIABETES MELLITUS WITH PERIPHERAL VASCULAR DISEASE (H): ICD-10-CM

## 2022-02-08 DIAGNOSIS — Z87.2 HISTORY OF ULCER OF LOWER EXTREMITY: ICD-10-CM

## 2022-02-08 DIAGNOSIS — E11.49 TYPE II OR UNSPECIFIED TYPE DIABETES MELLITUS WITH NEUROLOGICAL MANIFESTATIONS, NOT STATED AS UNCONTROLLED(250.60) (H): Primary | ICD-10-CM

## 2022-02-08 PROCEDURE — 99214 OFFICE O/P EST MOD 30 MIN: CPT | Performed by: PODIATRIST

## 2022-02-08 NOTE — NURSING NOTE
Amos Walker's chief complaint for this visit includes:  Chief Complaint   Patient presents with     Follow Up     Right and left ankle ulcer      PCP: Racheal Swift    Referring Provider:  No referring provider defined for this encounter.    There were no vitals taken for this visit.  Data Unavailable     Do you need any medication refills at today's visit?
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
## 2022-02-22 ENCOUNTER — OFFICE VISIT (OUTPATIENT)
Dept: PODIATRY | Facility: CLINIC | Age: 77
End: 2022-02-22
Payer: COMMERCIAL

## 2022-02-22 DIAGNOSIS — L97.321 SKIN ULCER OF LEFT ANKLE, LIMITED TO BREAKDOWN OF SKIN (H): ICD-10-CM

## 2022-02-22 DIAGNOSIS — E11.49 TYPE II OR UNSPECIFIED TYPE DIABETES MELLITUS WITH NEUROLOGICAL MANIFESTATIONS, NOT STATED AS UNCONTROLLED(250.60) (H): Primary | ICD-10-CM

## 2022-02-22 DIAGNOSIS — E11.610 CHARCOT FOOT DUE TO DIABETES MELLITUS (H): ICD-10-CM

## 2022-02-22 DIAGNOSIS — E11.51 DIABETES MELLITUS WITH PERIPHERAL VASCULAR DISEASE (H): ICD-10-CM

## 2022-02-22 DIAGNOSIS — I87.8 VENOUS STASIS: ICD-10-CM

## 2022-02-22 DIAGNOSIS — Z87.2 HISTORY OF ULCER OF LOWER EXTREMITY: ICD-10-CM

## 2022-02-22 PROCEDURE — 99214 OFFICE O/P EST MOD 30 MIN: CPT | Performed by: PODIATRIST
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
## 2022-02-22 NOTE — NURSING NOTE
Amos Walker's chief complaint for this visit includes:  Chief Complaint   Patient presents with     Follow Up     Right ankle ulcer     PCP: Racheal Swift    Referring Provider:  No referring provider defined for this encounter.    There were no vitals taken for this visit.  Data Unavailable     Do you need any medication refills at today's visit?

## 2022-03-07 ENCOUNTER — OFFICE VISIT (OUTPATIENT)
Dept: OPHTHALMOLOGY | Facility: CLINIC | Age: 77
End: 2022-03-07
Attending: OPHTHALMOLOGY
Payer: COMMERCIAL

## 2022-03-07 DIAGNOSIS — E11.3293 TYPE 2 DIABETES MELLITUS WITH BOTH EYES AFFECTED BY MILD NONPROLIFERATIVE RETINOPATHY WITHOUT MACULAR EDEMA, WITH LONG-TERM CURRENT USE OF INSULIN (H): Primary | ICD-10-CM

## 2022-03-07 DIAGNOSIS — Z79.4 TYPE 2 DIABETES MELLITUS WITH BOTH EYES AFFECTED BY MILD NONPROLIFERATIVE RETINOPATHY WITHOUT MACULAR EDEMA, WITH LONG-TERM CURRENT USE OF INSULIN (H): Primary | ICD-10-CM

## 2022-03-07 DIAGNOSIS — H43.813 POSTERIOR VITREOUS DETACHMENT OF BOTH EYES: ICD-10-CM

## 2022-03-07 PROCEDURE — 99213 OFFICE O/P EST LOW 20 MIN: CPT | Mod: GC | Performed by: OPHTHALMOLOGY

## 2022-03-07 PROCEDURE — G0463 HOSPITAL OUTPT CLINIC VISIT: HCPCS

## 2022-03-07 ASSESSMENT — TONOMETRY
OS_IOP_MMHG: 12
IOP_METHOD: TONOPEN
OD_IOP_MMHG: 14

## 2022-03-07 ASSESSMENT — EXTERNAL EXAM - RIGHT EYE: OD_EXAM: NORMAL

## 2022-03-07 ASSESSMENT — VISUAL ACUITY
OD_SC+: -1
OS_SC: 20/40
OS_PH_SC+: -1
METHOD: SNELLEN - LINEAR
OS_PH_SC: 20/25
OD_SC: 20/20

## 2022-03-07 ASSESSMENT — CONF VISUAL FIELD
OD_NORMAL: 1
METHOD: COUNTING FINGERS
OS_NORMAL: 1

## 2022-03-07 ASSESSMENT — SLIT LAMP EXAM - LIDS
COMMENTS: NORMAL
COMMENTS: NORMAL

## 2022-03-07 ASSESSMENT — EXTERNAL EXAM - LEFT EYE: OS_EXAM: NORMAL

## 2022-03-07 ASSESSMENT — CUP TO DISC RATIO
OD_RATIO: 0.35
OS_RATIO: 0.35

## 2022-03-07 NOTE — NURSING NOTE
"Chief Complaints and History of Present Illnesses   Patient presents with     Diabetic Retinopathy Follow Up     Chief Complaint(s) and History of Present Illness(es)     Diabetic Retinopathy Follow Up     Laterality: both eyes    Onset: years ago    Quality: blurred vision    Course: gradually worsening    Associated symptoms: dryness and flashes (only when blood pressure or blood sugar drops).  Negative for floaters and eye pain    Treatments tried: artificial tears    Pain scale: 0/10              Comments     1 year follow up for DR each eye.   Patient states vision is a little more blurry each eye at distance and near within the last year. Patient notes some occasional mucus in corners of eyes. \"I use drops when my eyes feel really dry.\"     Last BS: 137 at 1:13pm on 3/7/22.  Lab Results       Component                Value               Date                       A1C                      6.1                 01/10/2022                 A1C                      6.4                 08/16/2021                 A1C                      6.0                 01/12/2021                 A1C                      5.8                 09/02/2020                 A1C                      5.8                 12/20/2019                 A1C                      5.6                 10/04/2019              Jess Wakefield, COT 1:13 PM 03/07/2022              \  "

## 2022-03-07 NOTE — PROGRESS NOTES
I have confirmed the patient's and reviewed Past Medical History, Past Surgical History, Social History, Family History, Problem List, Medication List and agree with Tech note.      CC: follow up diabetic retinopathy    Interval: No changes no concerns     HPI:   Amos Walker is a 75 year old male with the following ophthalmologic problems:  Here for diabetic retinopathy check. Notes blurry vision in the left eye.     Blood sugars have been fairly well controlled. Following up with endocrinologist next week.  History of DM2 x 25yrs, on insulin, most recent HbA1c: 6.1 (1/22)    ASSESSMENT/PLAN  1. DM mild diabetic retinopathy both eyes  - Blood pressure (<120/80) and blood glucose (HbA1c <7.0) control discussed with patient. Patient advised that failure to adequately control each may lead to vision loss. The patient expressed understanding.    2. Pseudophakia OU  - RD precautions    3. Drusen BE  - not AMD, mild    4. PVD  Retinal detachment/retinal tear precautions discussed with patient  Contact clinic immediately for new floaters/flashers or shadows in vision      return to clinic: 12 months dilated fundus exam     Dulce Joshi MD  Ophthalmology Resident PGY3  [facility]     Kevin Aguilera MD  Vitreoretinal Surgery Fellow  [facility]     Attestation:  I have seen and examined the patient with Dr. Aguilera and agree with the findings in this note, as well as the interpretations of the diagnostic tests.      Jen Andersen MD PhD.  Professor & Chair

## 2022-03-08 ENCOUNTER — OFFICE VISIT (OUTPATIENT)
Dept: PODIATRY | Facility: CLINIC | Age: 77
End: 2022-03-08
Payer: COMMERCIAL

## 2022-03-08 DIAGNOSIS — Z87.2 HISTORY OF ULCER OF LOWER EXTREMITY: ICD-10-CM

## 2022-03-08 DIAGNOSIS — E11.51 DIABETES MELLITUS WITH PERIPHERAL VASCULAR DISEASE (H): ICD-10-CM

## 2022-03-08 DIAGNOSIS — E11.49 TYPE II OR UNSPECIFIED TYPE DIABETES MELLITUS WITH NEUROLOGICAL MANIFESTATIONS, NOT STATED AS UNCONTROLLED(250.60) (H): Primary | ICD-10-CM

## 2022-03-08 DIAGNOSIS — I87.8 VENOUS STASIS: ICD-10-CM

## 2022-03-08 DIAGNOSIS — E11.610 CHARCOT FOOT DUE TO DIABETES MELLITUS (H): ICD-10-CM

## 2022-03-08 DIAGNOSIS — L97.321 SKIN ULCER OF LEFT ANKLE, LIMITED TO BREAKDOWN OF SKIN (H): ICD-10-CM

## 2022-03-08 PROCEDURE — 99214 OFFICE O/P EST MOD 30 MIN: CPT | Mod: 25 | Performed by: PODIATRIST

## 2022-03-08 PROCEDURE — 11719 TRIM NAIL(S) ANY NUMBER: CPT | Performed by: PODIATRIST

## 2022-03-08 NOTE — NURSING NOTE
Amos Walker's chief complaint for this visit includes:  Chief Complaint   Patient presents with     Follow Up     Right ankle ulcer/Left ankle ulcer     PCP: Racheal Swift    Referring Provider:  No referring provider defined for this encounter.    There were no vitals taken for this visit.  Data Unavailable        Allergies   Allergen Reactions     Seasonal Allergies      Lisinopril Dizziness     Methylchloroisothiazolinone [Methylisothiazolinone] Rash     Neomycin      Wound gets worse     Povidone Iodine Rash         Do you need any medication refills at today's visit?
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
## 2022-03-18 ENCOUNTER — MEDICAL CORRESPONDENCE (OUTPATIENT)
Dept: HEALTH INFORMATION MANAGEMENT | Facility: CLINIC | Age: 77
End: 2022-03-18
Payer: COMMERCIAL

## 2022-03-18 DIAGNOSIS — E11.51 TYPE 2 DIABETES MELLITUS WITH DIABETIC PERIPHERAL ANGIOPATHY WITHOUT GANGRENE, WITH LONG-TERM CURRENT USE OF INSULIN (H): ICD-10-CM

## 2022-03-18 DIAGNOSIS — Z79.4 TYPE 2 DIABETES MELLITUS WITH DIABETIC PERIPHERAL ANGIOPATHY WITHOUT GANGRENE, WITH LONG-TERM CURRENT USE OF INSULIN (H): ICD-10-CM

## 2022-03-19 RX ORDER — FLASH GLUCOSE SENSOR
KIT MISCELLANEOUS
Qty: 6 EACH | Refills: 3 | Status: SHIPPED | OUTPATIENT
Start: 2022-03-19 | End: 2023-02-03

## 2022-03-19 NOTE — TELEPHONE ENCOUNTER
continuous blood glucose monitoring (FREESTYLE LATOYA) sensor    1/10/2022  [facility] Internal Medicine Racheal Pisano MD    Internal Medicine

## 2022-03-22 ENCOUNTER — OFFICE VISIT (OUTPATIENT)
Dept: PODIATRY | Facility: CLINIC | Age: 77
End: 2022-03-22
Payer: COMMERCIAL

## 2022-03-22 DIAGNOSIS — L97.521 SKIN ULCER OF LEFT FOOT, LIMITED TO BREAKDOWN OF SKIN (H): ICD-10-CM

## 2022-03-22 DIAGNOSIS — I87.8 VENOUS STASIS: ICD-10-CM

## 2022-03-22 DIAGNOSIS — E11.51 DIABETES MELLITUS WITH PERIPHERAL VASCULAR DISEASE (H): ICD-10-CM

## 2022-03-22 DIAGNOSIS — L97.321 SKIN ULCER OF LEFT ANKLE, LIMITED TO BREAKDOWN OF SKIN (H): ICD-10-CM

## 2022-03-22 DIAGNOSIS — E11.49 TYPE II OR UNSPECIFIED TYPE DIABETES MELLITUS WITH NEUROLOGICAL MANIFESTATIONS, NOT STATED AS UNCONTROLLED(250.60) (H): Primary | ICD-10-CM

## 2022-03-22 DIAGNOSIS — E11.610 CHARCOT FOOT DUE TO DIABETES MELLITUS (H): ICD-10-CM

## 2022-03-22 PROCEDURE — 99214 OFFICE O/P EST MOD 30 MIN: CPT | Performed by: PODIATRIST

## 2022-03-22 NOTE — NURSING NOTE
Amos Walker's chief complaint for this visit includes:  Chief Complaint   Patient presents with     Follow Up     Right ankle ulcer     PCP: Racheal Swift    Referring Provider:  No referring provider defined for this encounter.    There were no vitals taken for this visit.  Data Unavailable        Allergies   Allergen Reactions     Seasonal Allergies      Lisinopril Dizziness     Methylchloroisothiazolinone [Methylisothiazolinone] Rash     Neomycin      Wound gets worse     Povidone Iodine Rash         Do you need any medication refills at today's visit?
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
## 2022-03-24 NOTE — TELEPHONE ENCOUNTER
"Oncology Distress Screening Follow-up  Clinical Social Work  [facility]    * Of note- patient does NOT have cancer dx- was screened inappropriately    Identified Concern and Score From Distress Screening:  This patient has scored >= 6 on questions 3, 4, 6, and/or 7 on the Oncology Distress Screening questionnaire.  6. How concerned are you about work and home life issues that may be affected by your cancer? : 6        Date of Distress Screenin2021      Data: At time of last visit, Patient scored positive on distress screen.  called Patient today with intention of introducing them to psychosocial services and support, and following up on elevated distress.      Intervention/Education Provided: Spoke with patient over the phone, he did not really remember answering the distress screening questionnaire.  He stated that although he sometimes gets frustrated with MyChart and getting prescriptions filled on time, he is doing \"okay\".  He stated that he is not feel particularly anxious or depressed.  He stated: \"I'm going okay\".  He is retired so although his health care takes a lot of time, he feels that he has the time to do it.  He is not currently seeing a therapist (psychologist) and does not feel he needs to at this time.  He is a retired clergyman.      SW did offer to assist with mental health resources if he was interested or changed his mind.  He was provided with education on mental health resources and how to reach out to his care team should he change his mind.      Follow-up Required:   will remain available as needed.      BRENDON Miller, [facility]  Adult Cystic Fibrosis   Ph: [phone]  Pager: [phone]     "
no

## 2022-04-05 ENCOUNTER — OFFICE VISIT (OUTPATIENT)
Dept: PODIATRY | Facility: CLINIC | Age: 77
End: 2022-04-05
Payer: COMMERCIAL

## 2022-04-05 DIAGNOSIS — L97.321 SKIN ULCER OF LEFT ANKLE, LIMITED TO BREAKDOWN OF SKIN (H): ICD-10-CM

## 2022-04-05 DIAGNOSIS — L97.521 SKIN ULCER OF LEFT FOOT, LIMITED TO BREAKDOWN OF SKIN (H): ICD-10-CM

## 2022-04-05 DIAGNOSIS — E11.610 CHARCOT FOOT DUE TO DIABETES MELLITUS (H): ICD-10-CM

## 2022-04-05 DIAGNOSIS — E11.51 DIABETES MELLITUS WITH PERIPHERAL VASCULAR DISEASE (H): ICD-10-CM

## 2022-04-05 DIAGNOSIS — E11.49 TYPE II OR UNSPECIFIED TYPE DIABETES MELLITUS WITH NEUROLOGICAL MANIFESTATIONS, NOT STATED AS UNCONTROLLED(250.60) (H): Primary | ICD-10-CM

## 2022-04-05 PROCEDURE — 99214 OFFICE O/P EST MOD 30 MIN: CPT | Performed by: PODIATRIST

## 2022-04-05 NOTE — NURSING NOTE
Amos Walker's chief complaint for this visit includes:  Chief Complaint   Patient presents with     Follow Up     Right and left ankle uclers     PCP: Racheal Swift    Referring Provider:  No referring provider defined for this encounter.    There were no vitals taken for this visit.  Data Unavailable        Allergies   Allergen Reactions     Seasonal Allergies      Lisinopril Dizziness     Methylchloroisothiazolinone [Methylisothiazolinone] Rash     Neomycin      Wound gets worse     Povidone Iodine Rash         Do you need any medication refills at today's visit?
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
## 2022-04-19 ENCOUNTER — OFFICE VISIT (OUTPATIENT)
Dept: PODIATRY | Facility: CLINIC | Age: 77
End: 2022-04-19
Payer: COMMERCIAL

## 2022-04-19 DIAGNOSIS — I87.8 VENOUS STASIS: ICD-10-CM

## 2022-04-19 DIAGNOSIS — E11.610 CHARCOT FOOT DUE TO DIABETES MELLITUS (H): ICD-10-CM

## 2022-04-19 DIAGNOSIS — L97.322 SKIN ULCER OF LEFT ANKLE WITH FAT LAYER EXPOSED (H): ICD-10-CM

## 2022-04-19 DIAGNOSIS — E11.49 TYPE II OR UNSPECIFIED TYPE DIABETES MELLITUS WITH NEUROLOGICAL MANIFESTATIONS, NOT STATED AS UNCONTROLLED(250.60) (H): Primary | ICD-10-CM

## 2022-04-19 DIAGNOSIS — E11.51 DIABETES MELLITUS WITH PERIPHERAL VASCULAR DISEASE (H): ICD-10-CM

## 2022-04-19 DIAGNOSIS — L97.521 SKIN ULCER OF LEFT FOOT, LIMITED TO BREAKDOWN OF SKIN (H): ICD-10-CM

## 2022-04-19 PROCEDURE — 99214 OFFICE O/P EST MOD 30 MIN: CPT | Mod: 25 | Performed by: PODIATRIST

## 2022-04-19 PROCEDURE — 11055 PARING/CUTG B9 HYPRKER LES 1: CPT | Performed by: PODIATRIST

## 2022-04-19 RX ORDER — CLINDAMYCIN HCL 300 MG
300 CAPSULE ORAL 4 TIMES DAILY
Qty: 20 CAPSULE | Refills: 0 | Status: SHIPPED | OUTPATIENT
Start: 2022-04-19 | End: 2023-02-03
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
## 2022-04-19 NOTE — NURSING NOTE
Amos Walker's chief complaint for this visit includes:  Chief Complaint   Patient presents with     RECHECK     Ankle ulcer     PCP: Racheal Swift    Referring Provider:  No referring provider defined for this encounter.    There were no vitals taken for this visit.  Data Unavailable     Do you need any medication refills at today's visit? NO    Allergies   Allergen Reactions     Seasonal Allergies      Lisinopril Dizziness     Methylchloroisothiazolinone [Methylisothiazolinone] Rash     Neomycin      Wound gets worse     Povidone Iodine Rash       Paola Laws, ATC
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
## 2022-04-21 ENCOUNTER — TELEPHONE (OUTPATIENT)
Dept: INTERNAL MEDICINE | Facility: CLINIC | Age: 77
End: 2022-04-21
Payer: COMMERCIAL

## 2022-04-26 ENCOUNTER — OFFICE VISIT (OUTPATIENT)
Dept: PODIATRY | Facility: CLINIC | Age: 77
End: 2022-04-26
Payer: COMMERCIAL

## 2022-04-26 DIAGNOSIS — E11.610 CHARCOT FOOT DUE TO DIABETES MELLITUS (H): ICD-10-CM

## 2022-04-26 DIAGNOSIS — L97.521 SKIN ULCER OF LEFT FOOT, LIMITED TO BREAKDOWN OF SKIN (H): ICD-10-CM

## 2022-04-26 DIAGNOSIS — L97.322 SKIN ULCER OF LEFT ANKLE WITH FAT LAYER EXPOSED (H): ICD-10-CM

## 2022-04-26 DIAGNOSIS — E11.49 TYPE II OR UNSPECIFIED TYPE DIABETES MELLITUS WITH NEUROLOGICAL MANIFESTATIONS, NOT STATED AS UNCONTROLLED(250.60) (H): Primary | ICD-10-CM

## 2022-04-26 DIAGNOSIS — E11.51 DIABETES MELLITUS WITH PERIPHERAL VASCULAR DISEASE (H): ICD-10-CM

## 2022-04-26 PROCEDURE — 99215 OFFICE O/P EST HI 40 MIN: CPT | Performed by: PODIATRIST

## 2022-04-26 RX ORDER — CLINDAMYCIN HCL 300 MG
300 CAPSULE ORAL 2 TIMES DAILY
Qty: 60 CAPSULE | Refills: 0 | Status: SHIPPED | OUTPATIENT
Start: 2022-04-26 | End: 2023-02-03

## 2022-04-26 ASSESSMENT — PAIN SCALES - GENERAL: PAINLEVEL: NO PAIN (0)
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
## 2022-04-26 NOTE — NURSING NOTE
Amos Walker's chief complaint for this visit includes:  Chief Complaint   Patient presents with     Right Ankle - Follow Up     Left Ankle - Follow Up     PCP: Racheal Swift    Referring Provider:  No referring provider defined for this encounter.    There were no vitals taken for this visit.  No Pain (0)     Do you need any medication refills at today's visit? NO    Allergies   Allergen Reactions     Seasonal Allergies      Lisinopril Dizziness     Methylchloroisothiazolinone [Methylisothiazolinone] Rash     Neomycin      Wound gets worse     Povidone Iodine Rash       Conrado Ashby, EMT

## 2022-04-30 ENCOUNTER — HEALTH MAINTENANCE LETTER (OUTPATIENT)
Age: 77
End: 2022-04-30

## 2022-05-03 ENCOUNTER — OFFICE VISIT (OUTPATIENT)
Dept: PODIATRY | Facility: CLINIC | Age: 77
End: 2022-05-03
Payer: COMMERCIAL

## 2022-05-03 DIAGNOSIS — L97.911 ULCER OF RIGHT LOWER EXTREMITY, LIMITED TO BREAKDOWN OF SKIN (H): ICD-10-CM

## 2022-05-03 DIAGNOSIS — E11.49 TYPE II OR UNSPECIFIED TYPE DIABETES MELLITUS WITH NEUROLOGICAL MANIFESTATIONS, NOT STATED AS UNCONTROLLED(250.60) (H): Primary | ICD-10-CM

## 2022-05-03 DIAGNOSIS — L97.322 SKIN ULCER OF LEFT ANKLE WITH FAT LAYER EXPOSED (H): ICD-10-CM

## 2022-05-03 DIAGNOSIS — L97.521 SKIN ULCER OF LEFT FOOT, LIMITED TO BREAKDOWN OF SKIN (H): ICD-10-CM

## 2022-05-03 DIAGNOSIS — E11.610 CHARCOT FOOT DUE TO DIABETES MELLITUS (H): ICD-10-CM

## 2022-05-03 DIAGNOSIS — E11.51 DIABETES MELLITUS WITH PERIPHERAL VASCULAR DISEASE (H): ICD-10-CM

## 2022-05-03 PROCEDURE — 99214 OFFICE O/P EST MOD 30 MIN: CPT | Performed by: PODIATRIST

## 2022-05-03 NOTE — NURSING NOTE
Amos Walker's chief complaint for this visit includes:  Chief Complaint   Patient presents with     Follow Up     Bilateral leg ulcers     PCP: Racheal Swift    Referring Provider:  No referring provider defined for this encounter.    There were no vitals taken for this visit.  Data Unavailable        Allergies   Allergen Reactions     Seasonal Allergies      Lisinopril Dizziness     Methylchloroisothiazolinone [Methylisothiazolinone] Rash     Neomycin      Wound gets worse     Povidone Iodine Rash         Do you need any medication refills at today's visit?
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
## 2022-05-10 ENCOUNTER — OFFICE VISIT (OUTPATIENT)
Dept: PODIATRY | Facility: CLINIC | Age: 77
End: 2022-05-10
Payer: COMMERCIAL

## 2022-05-10 DIAGNOSIS — E11.51 DIABETES MELLITUS WITH PERIPHERAL VASCULAR DISEASE (H): ICD-10-CM

## 2022-05-10 DIAGNOSIS — L97.911 ULCER OF RIGHT LOWER EXTREMITY, LIMITED TO BREAKDOWN OF SKIN (H): ICD-10-CM

## 2022-05-10 DIAGNOSIS — I87.8 VENOUS STASIS: ICD-10-CM

## 2022-05-10 DIAGNOSIS — E11.49 TYPE II OR UNSPECIFIED TYPE DIABETES MELLITUS WITH NEUROLOGICAL MANIFESTATIONS, NOT STATED AS UNCONTROLLED(250.60) (H): Primary | ICD-10-CM

## 2022-05-10 DIAGNOSIS — L97.521 SKIN ULCER OF LEFT FOOT, LIMITED TO BREAKDOWN OF SKIN (H): ICD-10-CM

## 2022-05-10 DIAGNOSIS — E11.610 CHARCOT FOOT DUE TO DIABETES MELLITUS (H): ICD-10-CM

## 2022-05-10 DIAGNOSIS — L97.321 SKIN ULCER OF LEFT ANKLE, LIMITED TO BREAKDOWN OF SKIN (H): ICD-10-CM

## 2022-05-10 PROCEDURE — 99214 OFFICE O/P EST MOD 30 MIN: CPT | Performed by: PODIATRIST

## 2022-05-10 RX ORDER — TRIAMCINOLONE ACETONIDE 1 MG/G
CREAM TOPICAL DAILY
Qty: 45 G | Refills: 1 | Status: ON HOLD | OUTPATIENT
Start: 2022-05-10 | End: 2023-07-03

## 2022-05-10 NOTE — NURSING NOTE
Amos Walker's chief complaint for this visit includes:  Chief Complaint   Patient presents with     Follow Up     Bilateral ankle ulcers     PCP: Racheal Swift    Referring Provider:  No referring provider defined for this encounter.    There were no vitals taken for this visit.  Data Unavailable        Allergies   Allergen Reactions     Seasonal Allergies      Lisinopril Dizziness     Methylchloroisothiazolinone [Methylisothiazolinone] Rash     Neomycin      Wound gets worse     Povidone Iodine Rash         Do you need any medication refills at today's visit?
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
## 2022-05-11 ENCOUNTER — OFFICE VISIT (OUTPATIENT)
Dept: INTERNAL MEDICINE | Facility: CLINIC | Age: 77
End: 2022-05-11
Payer: COMMERCIAL

## 2022-05-11 VITALS
WEIGHT: 168.4 LBS | BODY MASS INDEX: 20.94 KG/M2 | SYSTOLIC BLOOD PRESSURE: 132 MMHG | OXYGEN SATURATION: 99 % | DIASTOLIC BLOOD PRESSURE: 67 MMHG | HEART RATE: 92 BPM | RESPIRATION RATE: 12 BRPM | HEIGHT: 75 IN | TEMPERATURE: 97.6 F

## 2022-05-11 DIAGNOSIS — E11.40 TYPE 2 DIABETES, CONTROLLED, WITH NEUROPATHY (H): ICD-10-CM

## 2022-05-11 DIAGNOSIS — Z87.891 PERSONAL HISTORY OF TOBACCO USE, PRESENTING HAZARDS TO HEALTH: ICD-10-CM

## 2022-05-11 DIAGNOSIS — M81.0 AGE-RELATED OSTEOPOROSIS WITHOUT CURRENT PATHOLOGICAL FRACTURE: ICD-10-CM

## 2022-05-11 DIAGNOSIS — Z23 NEED FOR COVID-19 VACCINE: Primary | ICD-10-CM

## 2022-05-11 DIAGNOSIS — I10 PRIMARY HYPERTENSION: ICD-10-CM

## 2022-05-11 PROCEDURE — 91305 COVID-19,PF,PFIZER (12+ YRS): CPT | Performed by: INTERNAL MEDICINE

## 2022-05-11 PROCEDURE — 0054A COVID-19,PF,PFIZER (12+ YRS): CPT | Performed by: INTERNAL MEDICINE

## 2022-05-11 PROCEDURE — 99214 OFFICE O/P EST MOD 30 MIN: CPT | Mod: 25 | Performed by: INTERNAL MEDICINE

## 2022-05-11 RX ORDER — ALENDRONATE SODIUM 70 MG/1
70 TABLET ORAL
Qty: 12 TABLET | Refills: 3 | Status: SHIPPED | OUTPATIENT
Start: 2022-05-11 | End: 2022-05-11

## 2022-05-11 RX ORDER — ALENDRONATE SODIUM 70 MG/1
70 TABLET ORAL
Qty: 12 TABLET | Refills: 3 | Status: SHIPPED | OUTPATIENT
Start: 2022-05-11

## 2022-05-11 ASSESSMENT — PAIN SCALES - GENERAL: PAINLEVEL: NO PAIN (0)

## 2022-05-11 NOTE — PATIENT INSTRUCTIONS
Thank you for visiting the Primary Care Center today at the [facility]! The following is some information about our clinic:     Primary Care Center Frequently-Asked Questions    (1) How do I schedule appointments at the [facility]?     Primary Care--to schedule or make changes to an existing appointment, please call our primary care line at [phone].    Labs--to schedule a lab appointment at the [facility] you can use [employer] or call [phone]. If you have a [place] location that is closer to home, you can reach out to that location for scheduling options.     Imaging--if you need to schedule a CT, X-ray, MRI, ultrasound, or other imaging study you can call [phone] to schedule at the [facility] or any other [facility] imaging location.     Referrals--if a referral to another specialty was ordered you can expect a phone call from their scheduling team. If you have not heard from them in a week, please call us or send us a [employer]ximity message to check the status or get a scheduling number. Please note that this only applies to internal [facility] referrals. If the referral is external you would need to contact their office for scheduling.     (2) I have a question about my visit, who do I contact?     You can call us at the primary care line at [phone] to ask questions about your visit. You can also send a secure message through [employer], which is reviewed by clinic staff. Please note that [employer] messages have a twenty-four to forty-eight business hour turnaround time and should not be used for urgent concerns.    (3) How will I get the results of my tests?    If you are signed up for [employer] all tests will be released to you within twenty-four hours of resulting. Please allow three to five days for your doctor to review your results and place a note interpreting the results. If you do not have "[employer]hart you will receive your  results through mail seven to ten business days following the return of the tests. Please note that if there should be any urgent or concerning results that your doctor or their registered nurse will reach out to you the same day as the tests come back. If you have follow up questions about your results or would like to discuss the results in detail please schedule a follow up with your provider either in person or virtually.     (4) How do I get refills of my prescriptions?     You should always first contact your pharmacy for refills of your medications. If submitting a refill request on [employer], please be sure to submit the request only once--repeat requests can cause delays in refill. If you are requesting a NEW medication or a medication related to new symptoms you will need to schedule an appointment with a provider prior to approval. Please note: Routine medication refills have up to one to three business day turnaround whereas controlled substances refills have up to five to seven business day turnaround.    (5) I have new symptoms, what do I do?     If you are having an immediate medical emergency, you should dial 911 for assistance.   For anything urgent that needs to be seen within a few hours to one day you should visit a local urgent care for assistance.  For non-urgent symptoms that need to be seen within a few days to a week you can schedule with an available provider in primary care by going to [employer] or calling [phone].   If you are not sure how serious your symptoms are or you would like to receive medical advice you can always call [phone] to speak with a triage nurse.

## 2022-05-11 NOTE — NURSING NOTE
Chief Complaint   Patient presents with     Diabetes     Patient comes in for a follow up for diabetes.         Jerod Bauer MA on 5/11/2022 at 10:27 AM

## 2022-05-11 NOTE — PROGRESS NOTES
History of Present Illness:  Mr. Walker is a 76 year old adult who presents for  Chief Complaint   Patient presents with     Diabetes     Patient comes in for a follow up for diabetes.     PMH involving tobacco use, DM, HTN, CAD, PAD, Right Charcot foot, diabetic neuropathy, emphysema, tobacco use, anemia of chronic disease, MGUS.    Still seeing Dr. Chappell for foot wounds.  He has been taking 2.5-5 mg of lisinopril for BP.  He took 5 tablets today.  BP 120s/140s at home.  He can drop to 90s systolic.    We reviewed sugars, rare lows. Still on trulicity and lispro.    We review dexa scan. He is now taking Ca/D.  We discussed BP medications.  He got 1st booster last Fall.      Routine Health Maintenance  Immunizations:   Most Recent Immunizations   Administered Date(s) Administered     COVID-19,PF,Pfizer 05/06/2021     Influenza (High Dose) 3 valent vaccine 10/04/2019     Influenza (IIV3) PF 11/01/2013     Influenza, Quad, High Dose, Pf, 65yr + 10/05/2020     Pneumo Conj 13-V (2010&after) 11/03/2014     Pneumococcal 23 valent 12/21/2015     TDAP Vaccine (Boostrix) 03/21/2016      Lipids:   Recent Labs   Lab Test 09/02/20  0931 03/27/19  0934 11/18/14  0853 11/18/14  0853   CHOL 107 95   < > 110   HDL 69 38*   < > 45   LDL 22 49   < > 45   TRIG 80 40   < > 100   CHOLHDLRATIO  --   --   --  2.4    < > = values in this interval not displayed.      PSA (50-75 yrs): No results found for: PSA  DEXA: FRAX elevated, multiple fractures, qualifies for treatment, started alendronate 5/22  AAA Screening (65-75 yrs): CT 12/17, negative  Lung Ca Screening (>30 py 55-79 or >20 py 50-79 + RF): 7/20, 7/21  Colonoscopy (50-75 yrs): due 6/24, 6/19 Impression:          - The examined portion of the ileum was normal.                        - One 5 mm polyp in the cecum, removed with a cold snare                        and removed using injection-lift and a cold snare.                        Resected and retrieved.                         - One 6 mm polyp in the transverse colon, removed with a                        hot snare and removed using injection-lift and a hot                        snare. Resected and retrieved. Clip was placed.                        - One 5 mm polyp in the sigmoid colon, removed with a                        hot snare. Resected and retrieved.                        - The examination was otherwise normal on direct and                        retroflexion views.                        NOTE: the polyp reported as being in the descending                        colon in the prior colonoscopy report appears on images                        in the transverse colon; that is where we removed a                        likely SSA. The descending colon was inspected on                        multiple occasions and no polyps were identified.   Recommendation:      - Return to referring physician.                        - Repeat colonoscopy in 5 years for surveillance.                                                                           HIV/HCV if risk factors: nonreactive HepC 6/16  Safety/Lifestyle: reviewed  Tob/EtOH: declines quitting  Depression:   PHQ-2 Score:     PHQ-2 ( 1999 Pfizer) 1/5/2021 5/18/2020   Q1: Little interest or pleasure in doing things 0 0   Q2: Feeling down, depressed or hopeless 1 1   PHQ-2 Score 1 1        Advanced Directive: deferred         Review of external notes as documented above                   A detailed Review of Systems was performed, verified and is negative except as documented in the HPI.  All health questionnaires were reviewed, verified and relevant information documented above.      Past Medical History:  Past Medical History:   Diagnosis Date     Anemia      CAD (coronary artery disease)     2V CAD involving LAD and RCA, s/p DESx4 in 3/18     CKD (chronic kidney disease) stage 3, GFR 30-59 ml/min (H)      Colon polyp      Diabetic Charcot foot (H)      Emphysema of lung (H)     noted  on CT     Heart disease      HTN (hypertension)      Hyperlipidemia      MRSA cellulitis of right foot     in past.      Osteopenia of both hips      PAD (peripheral artery disease) (H) 09/2018    s/p R femoral enarterectomy and stenting      Tobacco use     50+ pack     Type 2 diabetes mellitus (H)     for 25 yrs.  on insulin and starlix     Venous ulcer (H)        Active Meds:  Current Outpatient Medications   Medication     acetaminophen (TYLENOL) 325 MG tablet     ammonium lactate (LAC-HYDRIN) 12 % external cream     ascorbic acid 500 MG TABS     aspirin 81 MG EC tablet     blood glucose monitoring (FREESTYLE) lancets     calcium carbonate (OS-CLAUDIA) 500 MG tablet     cephALEXin (KEFLEX) 500 MG capsule     cetirizine (ZYRTEC) 10 MG tablet     clindamycin (CLEOCIN) 300 MG capsule     clindamycin (CLEOCIN) 300 MG capsule     clopidogrel (PLAVIX) 75 MG tablet     Continuous Blood Gluc  (FREESTYLE LATOYA 14 DAY READER) TANIA     continuous blood glucose monitoring (FREESTYLE LATOYA) sensor     dulaglutide (TRULICITY) 1.5 MG/0.5ML pen     ferrous sulfate (FEROSUL) 325 (65 Fe) MG tablet     insulin lispro (HUMALOG KWIKPEN) 100 UNIT/ML (1 unit dial) KWIKPEN     insulin pen needle (B-D U/F) 31G X 8 MM miscellaneous     ketoconazole (NIZORAL) 2 % external shampoo     lisinopril (ZESTRIL) 2.5 MG tablet     ONETOUCH ULTRA test strip     silver sulfADIAZINE (SSD) 1 % external cream     simvastatin (ZOCOR) 40 MG tablet     triamcinolone (KENALOG) 0.025 % external ointment     triamcinolone (KENALOG) 0.1 % external cream     triamcinolone (KENALOG) 0.1 % external cream     triamcinolone (KENALOG) 0.1 % external ointment     VITAMIN D, CHOLECALCIFEROL, PO     No current facility-administered medications for this visit.        Allergies:  Reviewed, refer to EMR    Relevant Social History:  Social History     Tobacco Use     Smoking status: Current Every Day Smoker     Packs/day: 0.25     Years: 50.00     Pack years: 12.50     " Types: Cigarettes     Smokeless tobacco: Never Used   Substance Use Topics     Alcohol use: No     Drug use: No        Physical Exam:  Vitals: /67   Pulse 92   Temp 97.6  F (36.4  C) (Oral)   Resp 12   Ht 1.892 m (6' 2.5\")   Wt 76.4 kg (168 lb 6.4 oz)   SpO2 99%   BMI 21.33 kg/m    Constitutional: Alert, oriented, pleasant, no acute distress  Head: Normocephalic, atraumatic  Eyes: Extra-ocular movements intact, pupils equally round bilaterally, no scleral icterus  ENT: Oropharynx clear, moist mucus membranes  Cardiovascular: Regular rate and rhythm, no murmurs, rubs or gallops, peripheral pulses full/symmetric  Respiratory: Good air movement bilaterally, lungs clear, no wheezes/rales/rhonchi  Musculoskeletal: No edema, normal muscle tone, normal gait  Neurologic: Alert and oriented, cranial nerves 2-12 intact, grossly non-focal  Psychiatric: normal mentation, affect and mood      Diagnostics:  Labs reviewed in Epic          Assessment and Plan:  Amos was seen today for diabetes.    Diagnoses and all orders for this visit:    Need for COVID-19 vaccine  -     COVID-19,PF,PFIZER (12+ Yrs GRAY LABEL)    Personal history of tobacco use, presenting hazards to health  -     CT Chest Lung Cancer Scrn Low Dose wo; Future    Age-related osteoporosis without current pathological fracture  Discussed r/b/a. He is agreeable.  -     alendronate (FOSAMAX) 70 MG tablet; Take 1 tablet (70 mg) by mouth every 7 days    Cont current DM and BP management    Racheal Swift MD  Internal Medicine      >30 minutes spent today performing chart review, history and exam, documentation and further activities as noted above, exclusive of any procedures or EKG interpretation    "

## 2022-05-13 DIAGNOSIS — I73.9 PERIPHERAL VASCULAR DISEASE, UNSPECIFIED (H): ICD-10-CM

## 2022-05-16 ENCOUNTER — ANCILLARY PROCEDURE (OUTPATIENT)
Dept: ULTRASOUND IMAGING | Facility: CLINIC | Age: 77
End: 2022-05-16
Attending: SURGERY
Payer: COMMERCIAL

## 2022-05-16 DIAGNOSIS — I73.9 PAD (PERIPHERAL ARTERY DISEASE) (H): ICD-10-CM

## 2022-05-16 PROCEDURE — 93922 UPR/L XTREMITY ART 2 LEVELS: CPT | Mod: GC | Performed by: RADIOLOGY

## 2022-05-16 RX ORDER — CLOPIDOGREL BISULFATE 75 MG/1
75 TABLET ORAL DAILY
Qty: 90 TABLET | Refills: 1 | Status: SHIPPED | OUTPATIENT
Start: 2022-05-16 | End: 2022-12-01

## 2022-05-16 NOTE — TELEPHONE ENCOUNTER
CLOPIDOGREL 75MG TABLETS  Last Written Prescription Date:   11/30/2021  Last Fill Quantity: 90,   # refills: 1  Last Office Visit :  5/11/2022  Future Office visit:   9/12/2022    Routing refill request to provider for review/approval because:  Abnormal HGB  Refer to clinic for review     Recent Labs   Lab Test 11/23/21  1151   HGB 12.8*       Celeste Arceo RN  Central Triage Red Flags/Med Refills

## 2022-05-17 ENCOUNTER — OFFICE VISIT (OUTPATIENT)
Dept: PODIATRY | Facility: CLINIC | Age: 77
End: 2022-05-17
Payer: COMMERCIAL

## 2022-05-17 DIAGNOSIS — L97.911 ULCER OF RIGHT LOWER EXTREMITY, LIMITED TO BREAKDOWN OF SKIN (H): ICD-10-CM

## 2022-05-17 DIAGNOSIS — E11.610 CHARCOT FOOT DUE TO DIABETES MELLITUS (H): ICD-10-CM

## 2022-05-17 DIAGNOSIS — L97.321 SKIN ULCER OF LEFT ANKLE, LIMITED TO BREAKDOWN OF SKIN (H): ICD-10-CM

## 2022-05-17 DIAGNOSIS — E11.51 DIABETES MELLITUS WITH PERIPHERAL VASCULAR DISEASE (H): ICD-10-CM

## 2022-05-17 DIAGNOSIS — E11.49 TYPE II OR UNSPECIFIED TYPE DIABETES MELLITUS WITH NEUROLOGICAL MANIFESTATIONS, NOT STATED AS UNCONTROLLED(250.60) (H): Primary | ICD-10-CM

## 2022-05-17 DIAGNOSIS — L97.521 SKIN ULCER OF LEFT FOOT, LIMITED TO BREAKDOWN OF SKIN (H): ICD-10-CM

## 2022-05-17 PROCEDURE — 99215 OFFICE O/P EST HI 40 MIN: CPT | Performed by: PODIATRIST

## 2022-05-17 ASSESSMENT — PAIN SCALES - GENERAL: PAINLEVEL: NO PAIN (0)
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
## 2022-05-17 NOTE — NURSING NOTE
Amos Walker's chief complaint for this visit includes:  Chief Complaint   Patient presents with     Left Ankle - Wound Check     Right Ankle - Wound Check     PCP: Racheal Swift    Referring Provider:  No referring provider defined for this encounter.    There were no vitals taken for this visit.  No Pain (0)     Do you need any medication refills at today's visit? NO    Allergies   Allergen Reactions     Seasonal Allergies      Lisinopril Dizziness     Methylchloroisothiazolinone [Methylisothiazolinone] Rash     Neomycin      Wound gets worse     Povidone Iodine Rash       Conrado Ashby, EMT
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
## 2022-05-18 ENCOUNTER — TELEPHONE (OUTPATIENT)
Dept: INTERNAL MEDICINE | Facility: CLINIC | Age: 77
End: 2022-05-18

## 2022-05-18 ENCOUNTER — VIRTUAL VISIT (OUTPATIENT)
Dept: VASCULAR SURGERY | Facility: CLINIC | Age: 77
End: 2022-05-18
Payer: COMMERCIAL

## 2022-05-18 DIAGNOSIS — I70.243 ATHEROSCLEROSIS OF NATIVE ARTERY OF LEFT LOWER EXTREMITY WITH ULCERATION OF ANKLE (H): Primary | ICD-10-CM

## 2022-05-18 DIAGNOSIS — L97.929 ULCER OF LOWER LIMB, LEFT, WITH UNSPECIFIED SEVERITY (H): ICD-10-CM

## 2022-05-18 DIAGNOSIS — I73.9 PAD (PERIPHERAL ARTERY DISEASE) (H): ICD-10-CM

## 2022-05-18 PROCEDURE — 99215 OFFICE O/P EST HI 40 MIN: CPT | Mod: 95 | Performed by: SURGERY

## 2022-05-18 NOTE — PROGRESS NOTES
Vascular Surgery Consultation Note     Patient:  Amos Walker   Date of birth 1945, Medical record number [id number]  Date of Visit:  05/18/2022  Consult Requester:No att. providers found            Assessment and Recommendations:   ASSESSMENT / RECOMMENDATION:        Many thanks for involving me in the care of this very pleasant patient. Should any questions or concerns arise, please don't hesitate to contact me.    Warm Regards,    Karma Lyons MD, DFSVS, RPVI  Director, [facility] Vascular Services  Professor and Chief, Vascular and Endovascular Surgery  [facility]  Pager: 1. Send message or 10 digit call back number Securely via [employer] with the Vocera Web Console (learn more here)              2. Outside of [facility]? Call [phone]        45 minutes spent on the date of the encounter doing chart review, review of outside records, review of test results, interpretation of tests, patient visit and documentation           HPI: Amos is seen today on a video virtual visit for follow-up of his left ankle ulceration.  His right pretibial ulcerations are nearly completely healed as evidenced by the pictures in the chart.  He has a small area of scab on the right ankle with a small dorsal pinpoint area of eschar on the left second toe which looks more pressure related.  He follows weekly with Dr. Chappell and feels things are going well.  Things seem to be healing on both legs.  He denies rest pain.      Review of Systems   Constitutional, HEENT, cardiovascular, pulmonary, gi and gu systems are negative, except as otherwise noted.    Physical Exam   GENERAL: Healthy, alert and no distress  EYES: Eyes grossly normal to inspection.  No discharge or erythema, or obvious scleral/conjunctival abnormalities.  RESP: No audible wheeze, cough, or visible cyanosis.  No visible retractions or increased work of breathing.    SKIN: Please see recent images in the chart from Dr. Chappell  NEURO:  Cranial nerves grossly intact.  Mentation and speech appropriate for age.  PSYCH: Mentation appears normal, affect normal/bright, judgement and insight intact, normal speech and appearance well-groomed.    Imaging: ABIs with biphasic waveforms bilaterally including at the digital level.  Toe pressures are 53 mmHg in the right and 39 mm bebo in the left.    Video Start Time: 3 PM  Video End Time: 3:30 PM        Amos is a 77 year old who is being evaluated via a billable video visit.      How would you like to obtain your AVS? MyChart  If the video visit is dropped, the invitation should be resent by: Text to cell phone: [phone]   Will anyone else be joining your video visit? No   Patient is currently in the [facility]: Yes    Natividad Sanderson/CMA, 5/18/2022     Originating Location (pt. Location): Home    Distant Location (provider location):  [facility] [place]     Platform used for Video Visit: [employer]

## 2022-05-18 NOTE — LETTER
5/18/2022       RE: Amos Walker  [address]  [place] [address]     Dear Colleague,    Thank you for referring your patient, Amos Walker, to the [facility] [place] [facility]. Please see a copy of my visit note below.      Vascular Surgery Consultation Note     Patient:  Amos Walker   Date of birth 1945, Medical record number [id number]  Date of Visit:  05/18/2022  Consult Requester:No att. providers found            Assessment and Recommendations:   ASSESSMENT / RECOMMENDATION:    Many thanks for involving me in the care of this very pleasant patient. Should any questions or concerns arise, please don't hesitate to contact me.    Warm Regards,    Karma Lyons MD, DFSVS, RPVI  Director, [facility] Vascular Services  Professor and Chief, Vascular and Endovascular Surgery  [facility]  Pager: 1. Send message or 10 digit call back number Securely via [employer] with the [employer] Web Console (learn more here)              2. Outside of [facility]? Call [phone]        45 minutes spent on the date of the encounter doing chart review, review of outside records, review of test results, interpretation of tests, patient visit and documentation           HPI: Amos is seen today on a video virtual visit for follow-up of his left ankle ulceration.  His right pretibial ulcerations are nearly completely healed as evidenced by the pictures in the chart.  He has a small area of scab on the right ankle with a small dorsal pinpoint area of eschar on the left second toe which looks more pressure related.  He follows weekly with Dr. Chappell and feels things are going well.  Things seem to be healing on both legs.  He denies rest pain.      Review of Systems   Constitutional, HEENT, cardiovascular, pulmonary, gi and gu systems are negative, except as otherwise noted.    Physical Exam   GENERAL: Healthy, alert and no  distress  EYES: Eyes grossly normal to inspection.  No discharge or erythema, or obvious scleral/conjunctival abnormalities.  RESP: No audible wheeze, cough, or visible cyanosis.  No visible retractions or increased work of breathing.    SKIN: Please see recent images in the chart from Dr. Chappell  NEURO: Cranial nerves grossly intact.  Mentation and speech appropriate for age.  PSYCH: Mentation appears normal, affect normal/bright, judgement and insight intact, normal speech and appearance well-groomed.    Imaging: ABIs with biphasic waveforms bilaterally including at the digital level.  Toe pressures are 53 mmHg in the right and 39 mm bebo in the left.    Video Start Time: 3 PM  Video End Time: 3:30 PM      Sincerely,    Karma yLons MD

## 2022-05-18 NOTE — PATIENT INSTRUCTIONS
Thank you so much for choosing us for your care. It was a pleasure to see you at the vascular clinic today.     Follow-up recommendations: We will see you back in 1 year. Please let us know if any issues arise in the meantime.    Additional testing/imaging ordered today: Repeat ABIs in 1 year.      Our scheduling team will get in touch with you to set up any follow-up testing/imaging and/or appointments. Please be aware that any testing/imaging recommended today will need to completed prior to your next visit with the provider. If testing/imaging is not completed prior to your next visit, your visit may be rescheduled.        If you have any questions, please call Afsaneh Faulkner RN at [phone] or contact our clinic at [phone]. We also encourage the use of [employer] to communicate with your HealthCare Provider.      If you have an urgent need after business hours (8:00 am to 4:30 pm) please call [phone], option 4, and ask for the vascular attending on call. For non-urgent after hours needs, please call the vascular nurse at [phone] and leave a detailed voicemail. For scheduling needs, please call our clinic directly at [phone].

## 2022-05-18 NOTE — TELEPHONE ENCOUNTER
Spoke to patient. Lung CT in July Future labs in 4 months prior to next appt with Dr Swift per provider last visit 5/11/22. Pt is aware and prefer to make own appt.

## 2022-05-18 NOTE — NURSING NOTE
Chief Complaint   Patient presents with     Follow Up     6 months - US on monday       Patient confirms medications and allergies are accurate via patients echeck in completion, and or denies any changes since last reviewed/verified.     Ashley Merritt, Virtual Facilitator

## 2022-05-24 ENCOUNTER — OFFICE VISIT (OUTPATIENT)
Dept: PODIATRY | Facility: CLINIC | Age: 77
End: 2022-05-24
Payer: COMMERCIAL

## 2022-05-24 DIAGNOSIS — L97.321 SKIN ULCER OF LEFT ANKLE, LIMITED TO BREAKDOWN OF SKIN (H): ICD-10-CM

## 2022-05-24 DIAGNOSIS — E11.51 DIABETES MELLITUS WITH PERIPHERAL VASCULAR DISEASE (H): ICD-10-CM

## 2022-05-24 DIAGNOSIS — E11.610 CHARCOT FOOT DUE TO DIABETES MELLITUS (H): ICD-10-CM

## 2022-05-24 DIAGNOSIS — L97.521 SKIN ULCER OF LEFT FOOT, LIMITED TO BREAKDOWN OF SKIN (H): ICD-10-CM

## 2022-05-24 DIAGNOSIS — Z87.2 HISTORY OF ULCER OF LOWER EXTREMITY: ICD-10-CM

## 2022-05-24 DIAGNOSIS — E11.49 TYPE II OR UNSPECIFIED TYPE DIABETES MELLITUS WITH NEUROLOGICAL MANIFESTATIONS, NOT STATED AS UNCONTROLLED(250.60) (H): Primary | ICD-10-CM

## 2022-05-24 PROCEDURE — 99215 OFFICE O/P EST HI 40 MIN: CPT | Performed by: PODIATRIST
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
## 2022-05-24 NOTE — NURSING NOTE
Amos Walker's chief complaint for this visit includes:  Chief Complaint   Patient presents with     Follow Up     Right anterior ulcer and left medial ankle ulcer     PCP: Racheal Swift    Referring Provider:  No referring provider defined for this encounter.    There were no vitals taken for this visit.  Data Unavailable        Allergies   Allergen Reactions     Seasonal Allergies      Lisinopril Dizziness     Methylchloroisothiazolinone [Methylisothiazolinone] Rash     Neomycin      Wound gets worse     Povidone Iodine Rash         Do you need any medication refills at today's visit?

## 2022-06-07 ENCOUNTER — OFFICE VISIT (OUTPATIENT)
Dept: PODIATRY | Facility: CLINIC | Age: 77
End: 2022-06-07
Payer: COMMERCIAL

## 2022-06-07 DIAGNOSIS — E11.51 DIABETES MELLITUS WITH PERIPHERAL VASCULAR DISEASE (H): ICD-10-CM

## 2022-06-07 DIAGNOSIS — Z87.2 HISTORY OF ULCER OF LOWER EXTREMITY: ICD-10-CM

## 2022-06-07 DIAGNOSIS — L97.321 SKIN ULCER OF LEFT ANKLE, LIMITED TO BREAKDOWN OF SKIN (H): ICD-10-CM

## 2022-06-07 DIAGNOSIS — L97.521 SKIN ULCER OF LEFT FOOT, LIMITED TO BREAKDOWN OF SKIN (H): ICD-10-CM

## 2022-06-07 DIAGNOSIS — E11.49 TYPE II OR UNSPECIFIED TYPE DIABETES MELLITUS WITH NEUROLOGICAL MANIFESTATIONS, NOT STATED AS UNCONTROLLED(250.60) (H): Primary | ICD-10-CM

## 2022-06-07 DIAGNOSIS — E11.610 CHARCOT FOOT DUE TO DIABETES MELLITUS (H): ICD-10-CM

## 2022-06-07 PROCEDURE — 99214 OFFICE O/P EST MOD 30 MIN: CPT | Performed by: PODIATRIST

## 2022-06-07 ASSESSMENT — PAIN SCALES - GENERAL: PAINLEVEL: NO PAIN (0)
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
## 2022-06-07 NOTE — NURSING NOTE
Amos Walker's chief complaint for this visit includes:  Chief Complaint   Patient presents with     Right Foot - WOUND CARE     Left Foot - WOUND CARE     PCP: Racheal Swift    Referring Provider:  No referring provider defined for this encounter.    There were no vitals taken for this visit.  No Pain (0)     Do you need any medication refills at today's visit? NO    Allergies   Allergen Reactions     Seasonal Allergies      Lisinopril Dizziness     Methylchloroisothiazolinone [Methylisothiazolinone] Rash     Neomycin      Wound gets worse     Povidone Iodine Rash       Conrado Ashby, EMT

## 2022-06-08 RX ORDER — CLINDAMYCIN HCL 300 MG
300 CAPSULE ORAL 2 TIMES DAILY
Qty: 60 CAPSULE | Refills: 0 | Status: SHIPPED | OUTPATIENT
Start: 2022-06-08 | End: 2022-09-12

## 2022-06-17 ENCOUNTER — OFFICE VISIT (OUTPATIENT)
Dept: PODIATRY | Facility: CLINIC | Age: 77
End: 2022-06-17
Payer: COMMERCIAL

## 2022-06-17 DIAGNOSIS — E11.51 DIABETES MELLITUS WITH PERIPHERAL VASCULAR DISEASE (H): ICD-10-CM

## 2022-06-17 DIAGNOSIS — E11.610 CHARCOT FOOT DUE TO DIABETES MELLITUS (H): ICD-10-CM

## 2022-06-17 DIAGNOSIS — I87.8 VENOUS STASIS: ICD-10-CM

## 2022-06-17 DIAGNOSIS — E11.49 TYPE II OR UNSPECIFIED TYPE DIABETES MELLITUS WITH NEUROLOGICAL MANIFESTATIONS, NOT STATED AS UNCONTROLLED(250.60) (H): Primary | ICD-10-CM

## 2022-06-17 DIAGNOSIS — L97.322 SKIN ULCER OF LEFT ANKLE WITH FAT LAYER EXPOSED (H): ICD-10-CM

## 2022-06-17 DIAGNOSIS — L84 TYLOMA: ICD-10-CM

## 2022-06-17 DIAGNOSIS — L97.521 SKIN ULCER OF LEFT FOOT, LIMITED TO BREAKDOWN OF SKIN (H): ICD-10-CM

## 2022-06-17 PROCEDURE — 99215 OFFICE O/P EST HI 40 MIN: CPT | Performed by: PODIATRIST

## 2022-06-17 RX ORDER — GENTAMICIN SULFATE 1 MG/G
CREAM TOPICAL DAILY
Qty: 30 G | Refills: 0 | Status: SHIPPED | OUTPATIENT
Start: 2022-06-17 | End: 2022-10-25

## 2022-06-17 ASSESSMENT — PAIN SCALES - GENERAL: PAINLEVEL: NO PAIN (0)
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
## 2022-06-17 NOTE — NURSING NOTE
Amos Walker's chief complaint for this visit includes:  Chief Complaint   Patient presents with     Left Ankle - WOUND CARE     Right Ankle - WOUND CARE     PCP: Racheal Swift    Referring Provider:  No referring provider defined for this encounter.    There were no vitals taken for this visit.  No Pain (0)     Do you need any medication refills at today's visit? NO    Allergies   Allergen Reactions     Seasonal Allergies      Lisinopril Dizziness     Methylchloroisothiazolinone [Methylisothiazolinone] Rash     Neomycin      Wound gets worse     Povidone Iodine Rash       Conrado Ashby, EMT

## 2022-07-05 ENCOUNTER — OFFICE VISIT (OUTPATIENT)
Dept: PODIATRY | Facility: CLINIC | Age: 77
End: 2022-07-05
Payer: COMMERCIAL

## 2022-07-05 DIAGNOSIS — E11.51 DIABETES MELLITUS WITH PERIPHERAL VASCULAR DISEASE (H): ICD-10-CM

## 2022-07-05 DIAGNOSIS — E11.49 TYPE II OR UNSPECIFIED TYPE DIABETES MELLITUS WITH NEUROLOGICAL MANIFESTATIONS, NOT STATED AS UNCONTROLLED(250.60) (H): Primary | ICD-10-CM

## 2022-07-05 DIAGNOSIS — L97.322 SKIN ULCER OF LEFT ANKLE WITH FAT LAYER EXPOSED (H): ICD-10-CM

## 2022-07-05 DIAGNOSIS — E11.610 CHARCOT FOOT DUE TO DIABETES MELLITUS (H): ICD-10-CM

## 2022-07-05 DIAGNOSIS — I87.8 VENOUS STASIS: ICD-10-CM

## 2022-07-05 DIAGNOSIS — L97.521 SKIN ULCER OF LEFT FOOT, LIMITED TO BREAKDOWN OF SKIN (H): ICD-10-CM

## 2022-07-05 PROCEDURE — 99214 OFFICE O/P EST MOD 30 MIN: CPT | Performed by: PODIATRIST

## 2022-07-05 NOTE — NURSING NOTE
Amos Walker's chief complaint for this visit includes:  Chief Complaint   Patient presents with     Left Ankle - WOUND CARE     Right Ankle - WOUND CARE     PCP: Racheal Swift    Referring Provider:  No referring provider defined for this encounter.    There were no vitals taken for this visit.  Data Unavailable     Do you need any medication refills at today's visit? NO    Allergies   Allergen Reactions     Seasonal Allergies      Lisinopril Dizziness     Methylchloroisothiazolinone [Methylisothiazolinone] Rash     Neomycin      Wound gets worse     Povidone Iodine Rash       Conrado Ashby, EMT
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
## 2022-07-11 ENCOUNTER — TELEPHONE (OUTPATIENT)
Dept: INTERNAL MEDICINE | Facility: CLINIC | Age: 77
End: 2022-07-11

## 2022-07-11 DIAGNOSIS — E11.51 DIABETES MELLITUS WITH PERIPHERAL VASCULAR DISEASE (H): ICD-10-CM

## 2022-07-11 RX ORDER — INSULIN LISPRO 100 [IU]/ML
INJECTION, SOLUTION INTRAVENOUS; SUBCUTANEOUS
Qty: 15 ML | Refills: 1 | Status: CANCELLED | OUTPATIENT
Start: 2022-07-11

## 2022-07-12 DIAGNOSIS — E11.40 TYPE 2 DIABETES, CONTROLLED, WITH NEUROPATHY (H): ICD-10-CM

## 2022-07-12 RX ORDER — DULAGLUTIDE 1.5 MG/.5ML
1.5 INJECTION, SOLUTION SUBCUTANEOUS
Qty: 6 ML | Refills: 0 | Status: SHIPPED | OUTPATIENT
Start: 2022-07-12 | End: 2022-10-24

## 2022-07-12 RX ORDER — INSULIN LISPRO 100 [IU]/ML
INJECTION, SOLUTION INTRAVENOUS; SUBCUTANEOUS
Qty: 15 ML | Refills: 1 | Status: SHIPPED | OUTPATIENT
Start: 2022-07-12 | End: 2023-04-19

## 2022-07-12 NOTE — TELEPHONE ENCOUNTER
[facility] Call Center    Phone Message    May a detailed message be left on voicemail: yes     Reason for Call: Medication Refill Request    Has the patient contacted the pharmacy for the refill? Yes   Name of medication being requested:   dulaglutide (TRULICITY) 1.5 MG/0.5ML pen     Provider who prescribed the medication: Racheal Swift MD  Pharmacy: [id number] - [place], [place] - [address] AT [address]  Date medication is needed: asap   The patient said he usually get a 90 day supply not 30 days, please review and follow up with the patient to address any questions or concerns thank you.      Action Taken: Message routed to:  [facility] ([facility] pcc    Travel Screening: Not Applicable

## 2022-07-12 NOTE — TELEPHONE ENCOUNTER
INSULIN LISPRO 100U/ML KWIKPEN 3ML  INJECT 4 UNITS UNDER THE SKIN WITH BREAKFAST, AND 3 UNITS WITH LUNCH AND 4 units with DINNER  Last Written Prescription Date:  12/7/21  Last Fill Quantity: 15 ml ,   # refills: 1  Last Office Visit : 5/11/22  Future Office visit:  9/12/22     A1C done 1/10/22     6 month A1c due, refill has been sent

## 2022-07-12 NOTE — TELEPHONE ENCOUNTER
dulaglutide (TRULICITY) 1.5 MG/0.5ML pen      Last Written Prescription Date:  1/4/22  Last Fill Quantity: 6 ml,   # refills: 1  Last Office Visit : 5/11/22  Future Office visit:  9/12/22    Routing refill request to provider for review/approval because:  Overdue for A1C  Lab Test 01/10/22  1538 12/17/15  1148 09/28/15  0000   A1C 6.1*   < >  --    HEMOGLOBINA1  --   --  6.5*     Nothing more recent in care everywhere nor media  No future orders in queue for A1C  90 day refill provided per protocol, routed to clinic    Call to Amos, advised prescription for 3 month supply sent to requested pharmacy.    He verbalizes frustration with mychart of inability to request refills when needed.  Provided mychart number to contact to resolve issue

## 2022-07-14 DIAGNOSIS — E11.40 TYPE 2 DIABETES, CONTROLLED, WITH NEUROPATHY (H): ICD-10-CM

## 2022-07-18 RX ORDER — DULAGLUTIDE 1.5 MG/.5ML
INJECTION, SOLUTION SUBCUTANEOUS
Qty: 6 ML | Refills: 0 | OUTPATIENT
Start: 2022-07-18

## 2022-07-19 ENCOUNTER — OFFICE VISIT (OUTPATIENT)
Dept: PODIATRY | Facility: CLINIC | Age: 77
End: 2022-07-19
Payer: COMMERCIAL

## 2022-07-19 ENCOUNTER — ANCILLARY PROCEDURE (OUTPATIENT)
Dept: CT IMAGING | Facility: CLINIC | Age: 77
End: 2022-07-19
Attending: INTERNAL MEDICINE
Payer: COMMERCIAL

## 2022-07-19 DIAGNOSIS — L97.521 SKIN ULCER OF LEFT FOOT, LIMITED TO BREAKDOWN OF SKIN (H): ICD-10-CM

## 2022-07-19 DIAGNOSIS — E11.49 TYPE II OR UNSPECIFIED TYPE DIABETES MELLITUS WITH NEUROLOGICAL MANIFESTATIONS, NOT STATED AS UNCONTROLLED(250.60) (H): Primary | ICD-10-CM

## 2022-07-19 DIAGNOSIS — E11.51 DIABETES MELLITUS WITH PERIPHERAL VASCULAR DISEASE (H): ICD-10-CM

## 2022-07-19 DIAGNOSIS — L84 TYLOMA: ICD-10-CM

## 2022-07-19 DIAGNOSIS — L97.322 SKIN ULCER OF LEFT ANKLE WITH FAT LAYER EXPOSED (H): ICD-10-CM

## 2022-07-19 DIAGNOSIS — E11.610 CHARCOT FOOT DUE TO DIABETES MELLITUS (H): ICD-10-CM

## 2022-07-19 DIAGNOSIS — Z87.891 PERSONAL HISTORY OF TOBACCO USE, PRESENTING HAZARDS TO HEALTH: ICD-10-CM

## 2022-07-19 DIAGNOSIS — I87.8 VENOUS STASIS: ICD-10-CM

## 2022-07-19 PROCEDURE — 71271 CT THORAX LUNG CANCER SCR C-: CPT | Mod: GC | Performed by: RADIOLOGY

## 2022-07-19 PROCEDURE — 99215 OFFICE O/P EST HI 40 MIN: CPT | Performed by: PODIATRIST

## 2022-07-19 ASSESSMENT — PAIN SCALES - GENERAL: PAINLEVEL: NO PAIN (0)
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
## 2022-08-02 ENCOUNTER — OFFICE VISIT (OUTPATIENT)
Dept: PODIATRY | Facility: CLINIC | Age: 77
End: 2022-08-02
Payer: COMMERCIAL

## 2022-08-02 DIAGNOSIS — E11.51 DIABETES MELLITUS WITH PERIPHERAL VASCULAR DISEASE (H): ICD-10-CM

## 2022-08-02 DIAGNOSIS — L97.521 SKIN ULCER OF LEFT FOOT, LIMITED TO BREAKDOWN OF SKIN (H): ICD-10-CM

## 2022-08-02 DIAGNOSIS — E11.610 CHARCOT FOOT DUE TO DIABETES MELLITUS (H): ICD-10-CM

## 2022-08-02 DIAGNOSIS — L97.322 SKIN ULCER OF LEFT ANKLE WITH FAT LAYER EXPOSED (H): ICD-10-CM

## 2022-08-02 DIAGNOSIS — L97.922 SKIN ULCER OF LEFT LOWER LEG WITH FAT LAYER EXPOSED (H): ICD-10-CM

## 2022-08-02 DIAGNOSIS — E11.49 TYPE II OR UNSPECIFIED TYPE DIABETES MELLITUS WITH NEUROLOGICAL MANIFESTATIONS, NOT STATED AS UNCONTROLLED(250.60) (H): Primary | ICD-10-CM

## 2022-08-02 PROCEDURE — 99215 OFFICE O/P EST HI 40 MIN: CPT | Performed by: PODIATRIST

## 2022-08-02 RX ORDER — CLINDAMYCIN HCL 300 MG
300 CAPSULE ORAL 2 TIMES DAILY
Qty: 60 CAPSULE | Refills: 0 | Status: SHIPPED | OUTPATIENT
Start: 2022-08-02 | End: 2023-02-03
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
## 2022-08-10 NOTE — PATIENT INSTRUCTIONS
PROGRESS NOTE  Date of Service:  8/10/2022  Address: [place] [address] [facility]  [address] [place] [address] [address] [facility]  Dept: [phone]  Loc: [phone]    Subjective:      Patient ID: [id number]  Veena Ahumada is a 31 y.o. 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 Patient has 2 kids 10 and 1. Patient said her anxiety has always had anxiety, patient said she did not to go outside. Patient has intrusive thoughts. Patient is maybe open to a therapist. Patient has not taken any medications. Patient said anxiety has been worse because of second child. Patient sleeps well, she said 7-8. Patient said periods are not heavy, she said 3 tampons in her heaviest days. Patient does take walks with kids, patient  works at the [employer]. He is gone a lot. Review of Systems   Constitutional:  Negative for chills, fatigue and fever. Respiratory:  Negative for shortness of breath and wheezing. Cardiovascular:  Negative for chest pain. Psychiatric/Behavioral:  Negative for behavioral problems, sleep disturbance and suicidal ideas. The patient is nervous/anxious. All other systems reviewed and are negative. Objective:   Physical Exam  Vitals reviewed. Constitutional:       Appearance: Normal appearance. Cardiovascular:      Rate and Rhythm: Normal rate and regular rhythm. Pulses: Normal pulses. Heart sounds: Normal heart sounds. Pulmonary:      Effort: Pulmonary effort is normal.      Breath sounds: Normal breath sounds. Skin:     Capillary Refill: Capillary refill takes less than 2 seconds. Neurological:      General: No focal deficit present. Mental Status: She is alert and oriented to person, place, and time. Psychiatric:         Mood and Affect: Mood normal.         Behavior: Behavior normal.         Thought Content:  Thought Thanks for coming today.  [facility]  [address] [place] [address]    To schedule future appointments in Ortho Clinic, you may call [phone].    To schedule ordered imaging by your Provider: Call [facility] at [phone]    [employer] available online at:   [email or web address]    Please call if any further questions or concerns [phone] and ask for the Orthopedic Department. Clinic hours 8 am to 5 pm.    Return to clinic if symptoms worsen.     content normal.         Judgment: Judgment normal.       Plan:   1. Anxiety Long discussion today with patient about her anxiety but she is never been on anxiety medication. Patient will try us try taking at night and see if that helps and then will do twice a day if she tolerates medication well. Patient educated out side effects of medication we will follow-up in 6 weeks. -     busPIRone (BUSPAR) 5 MG tablet; Take 1 tablet by mouth in the morning and 1 tablet before bedtime. , Disp-60 tablet, R-1Normal           Electronically signed by THAI Call CNP on 8/10/22 at 5:18 PM EDT     This dictation was generated by voice recognition computer software. Although all attempts are made to edit the dictation for accuracy, there may be errors in the transcription that were not intended.

## 2022-08-16 ENCOUNTER — OFFICE VISIT (OUTPATIENT)
Dept: PODIATRY | Facility: CLINIC | Age: 77
End: 2022-08-16
Payer: COMMERCIAL

## 2022-08-16 DIAGNOSIS — E11.610 CHARCOT FOOT DUE TO DIABETES MELLITUS (H): ICD-10-CM

## 2022-08-16 DIAGNOSIS — L97.521 SKIN ULCER OF LEFT FOOT, LIMITED TO BREAKDOWN OF SKIN (H): ICD-10-CM

## 2022-08-16 DIAGNOSIS — E11.51 DIABETES MELLITUS WITH PERIPHERAL VASCULAR DISEASE (H): ICD-10-CM

## 2022-08-16 DIAGNOSIS — E11.49 TYPE II OR UNSPECIFIED TYPE DIABETES MELLITUS WITH NEUROLOGICAL MANIFESTATIONS, NOT STATED AS UNCONTROLLED(250.60) (H): Primary | ICD-10-CM

## 2022-08-16 DIAGNOSIS — L97.322 SKIN ULCER OF LEFT ANKLE WITH FAT LAYER EXPOSED (H): ICD-10-CM

## 2022-08-16 PROCEDURE — 99214 OFFICE O/P EST MOD 30 MIN: CPT | Performed by: PODIATRIST
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
## 2022-08-16 NOTE — NURSING NOTE
Amos Walker's chief complaint for this visit includes:  Chief Complaint   Patient presents with     Follow Up     Right and left ankle ulcers     PCP: Racheal Swift    Referring Provider:  No referring provider defined for this encounter.    There were no vitals taken for this visit.  Data Unavailable        Allergies   Allergen Reactions     Seasonal Allergies      Lisinopril Dizziness     Methylchloroisothiazolinone [Methylisothiazolinone] Rash     Neomycin      Wound gets worse     Povidone Iodine Rash         Do you need any medication refills at today's visit?

## 2022-08-20 ENCOUNTER — HEALTH MAINTENANCE LETTER (OUTPATIENT)
Age: 77
End: 2022-08-20

## 2022-08-31 ENCOUNTER — TELEPHONE (OUTPATIENT)
Dept: INTERNAL MEDICINE | Facility: CLINIC | Age: 77
End: 2022-08-31

## 2022-08-31 NOTE — TELEPHONE ENCOUNTER
[facility] Call Center    Phone Message    May a detailed message be left on voicemail: yes     Reason for Call: Other: Patient tested positive today for covid and needs to know what to do treatment wise. Please call and discuss.      Action Taken: Message routed to:  [facility]    Travel Screening: Not Applicable

## 2022-09-01 ENCOUNTER — VIRTUAL VISIT (OUTPATIENT)
Dept: INTERNAL MEDICINE | Facility: CLINIC | Age: 77
End: 2022-09-01
Payer: COMMERCIAL

## 2022-09-01 VITALS — BODY MASS INDEX: 21.53 KG/M2 | WEIGHT: 170 LBS

## 2022-09-01 DIAGNOSIS — U07.1 INFECTION DUE TO 2019 NOVEL CORONAVIRUS: ICD-10-CM

## 2022-09-01 DIAGNOSIS — I73.9 PERIPHERAL VASCULAR DISEASE, UNSPECIFIED (H): ICD-10-CM

## 2022-09-01 DIAGNOSIS — I83.003 VENOUS STASIS ULCER OF ANKLE, UNSPECIFIED LATERALITY, UNSPECIFIED ULCER STAGE, UNSPECIFIED WHETHER VARICOSE VEINS PRESENT (H): ICD-10-CM

## 2022-09-01 DIAGNOSIS — Z86.2 HISTORY OF ANEMIA: ICD-10-CM

## 2022-09-01 DIAGNOSIS — E11.40 TYPE 2 DIABETES, CONTROLLED, WITH NEUROPATHY (H): Primary | ICD-10-CM

## 2022-09-01 DIAGNOSIS — Z87.891 PERSONAL HISTORY OF TOBACCO USE, PRESENTING HAZARDS TO HEALTH: ICD-10-CM

## 2022-09-01 DIAGNOSIS — N18.30 STAGE 3 CHRONIC KIDNEY DISEASE, UNSPECIFIED WHETHER STAGE 3A OR 3B CKD (H): ICD-10-CM

## 2022-09-01 DIAGNOSIS — L97.309 VENOUS STASIS ULCER OF ANKLE, UNSPECIFIED LATERALITY, UNSPECIFIED ULCER STAGE, UNSPECIFIED WHETHER VARICOSE VEINS PRESENT (H): ICD-10-CM

## 2022-09-01 PROCEDURE — 99214 OFFICE O/P EST MOD 30 MIN: CPT | Mod: CS | Performed by: INTERNAL MEDICINE

## 2022-09-01 ASSESSMENT — PAIN SCALES - GENERAL: PAINLEVEL: NO PAIN (0)

## 2022-09-01 NOTE — TELEPHONE ENCOUNTER
RN called and spoke with patient's spouse, as patient was sleeping.  She was agreeable to patient having a video appt today with Dr. Swift for covid treatment and appt was made.    Michelle Miller RN on 9/1/2022 at 8:24 AM
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
## 2022-09-02 ENCOUNTER — TELEPHONE (OUTPATIENT)
Dept: INTERNAL MEDICINE | Facility: CLINIC | Age: 77
End: 2022-09-02

## 2022-09-02 NOTE — TELEPHONE ENCOUNTER
Yes, see my documentation. I instructed patient to hold statin while on paxlovid.  Thanks,  Racheal Swift MD  Internal Medicine

## 2022-09-02 NOTE — TELEPHONE ENCOUNTER
Dr. Swift    Pharmacy reports that there is a drug interaction between paxlovid and simvastatin. Is it OK pt holds simvastatin while taking paxlovid?    Soon-[place]

## 2022-09-08 ENCOUNTER — LAB (OUTPATIENT)
Dept: LAB | Facility: CLINIC | Age: 77
End: 2022-09-08
Payer: COMMERCIAL

## 2022-09-08 DIAGNOSIS — Z86.2 HISTORY OF ANEMIA: ICD-10-CM

## 2022-09-08 DIAGNOSIS — E11.40 TYPE 2 DIABETES, CONTROLLED, WITH NEUROPATHY (H): ICD-10-CM

## 2022-09-08 DIAGNOSIS — N18.30 STAGE 3 CHRONIC KIDNEY DISEASE, UNSPECIFIED WHETHER STAGE 3A OR 3B CKD (H): ICD-10-CM

## 2022-09-08 LAB
ERYTHROCYTE [DISTWIDTH] IN BLOOD BY AUTOMATED COUNT: 12.3 % (ref 10–15)
HBA1C MFR BLD: 6.3 % (ref 0–5.6)
HCT VFR BLD AUTO: 35.4 % (ref 40–53)
HGB BLD-MCNC: 12.2 G/DL (ref 13.3–17.7)
HOLD SPECIMEN: NORMAL
MCH RBC QN AUTO: 32.7 PG (ref 26.5–33)
MCHC RBC AUTO-ENTMCNC: 34.5 G/DL (ref 31.5–36.5)
MCV RBC AUTO: 95 FL (ref 78–100)
PLATELET # BLD AUTO: 187 10E3/UL (ref 150–450)
RBC # BLD AUTO: 3.73 10E6/UL (ref 4.4–5.9)
WBC # BLD AUTO: 3.9 10E3/UL (ref 4–11)

## 2022-09-08 PROCEDURE — 80053 COMPREHEN METABOLIC PANEL: CPT

## 2022-09-08 PROCEDURE — 80061 LIPID PANEL: CPT

## 2022-09-08 PROCEDURE — 85027 COMPLETE CBC AUTOMATED: CPT

## 2022-09-08 PROCEDURE — 36415 COLL VENOUS BLD VENIPUNCTURE: CPT

## 2022-09-08 PROCEDURE — 82043 UR ALBUMIN QUANTITATIVE: CPT

## 2022-09-08 PROCEDURE — 83036 HEMOGLOBIN GLYCOSYLATED A1C: CPT
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
## 2022-09-09 LAB
ALBUMIN SERPL-MCNC: 3.5 G/DL (ref 3.4–5)
ALP SERPL-CCNC: 111 U/L (ref 40–150)
ALT SERPL W P-5'-P-CCNC: 14 U/L (ref 0–70)
ANION GAP SERPL CALCULATED.3IONS-SCNC: 3 MMOL/L (ref 3–14)
AST SERPL W P-5'-P-CCNC: 20 U/L (ref 0–45)
BILIRUB SERPL-MCNC: 0.6 MG/DL (ref 0.2–1.3)
BUN SERPL-MCNC: 23 MG/DL (ref 7–30)
CALCIUM SERPL-MCNC: 8.4 MG/DL (ref 8.5–10.1)
CHLORIDE BLD-SCNC: 96 MMOL/L (ref 94–109)
CHOLEST SERPL-MCNC: 124 MG/DL
CO2 SERPL-SCNC: 27 MMOL/L (ref 20–32)
CREAT SERPL-MCNC: 1.05 MG/DL (ref 0.66–1.25)
CREAT UR-MCNC: 88 MG/DL
FASTING STATUS PATIENT QL REPORTED: NO
GFR SERPL CREATININE-BSD FRML MDRD: 74 ML/MIN/1.73M2
GLUCOSE BLD-MCNC: 116 MG/DL (ref 70–99)
HDLC SERPL-MCNC: 49 MG/DL
LDLC SERPL CALC-MCNC: 49 MG/DL
MICROALBUMIN UR-MCNC: 42 MG/L
MICROALBUMIN/CREAT UR: 47.73 MG/G CR (ref 0–17)
NONHDLC SERPL-MCNC: 75 MG/DL
POTASSIUM BLD-SCNC: 4.4 MMOL/L (ref 3.4–5.3)
PROT SERPL-MCNC: 7.2 G/DL (ref 6.8–8.8)
SODIUM SERPL-SCNC: 126 MMOL/L (ref 133–144)
TRIGL SERPL-MCNC: 129 MG/DL

## 2022-09-12 ENCOUNTER — LAB (OUTPATIENT)
Dept: LAB | Facility: CLINIC | Age: 77
End: 2022-09-12
Payer: COMMERCIAL

## 2022-09-12 ENCOUNTER — OFFICE VISIT (OUTPATIENT)
Dept: INTERNAL MEDICINE | Facility: CLINIC | Age: 77
End: 2022-09-12
Payer: COMMERCIAL

## 2022-09-12 VITALS
DIASTOLIC BLOOD PRESSURE: 64 MMHG | WEIGHT: 171.6 LBS | BODY MASS INDEX: 21.34 KG/M2 | RESPIRATION RATE: 18 BRPM | HEART RATE: 89 BPM | SYSTOLIC BLOOD PRESSURE: 146 MMHG | HEIGHT: 75 IN | OXYGEN SATURATION: 99 %

## 2022-09-12 DIAGNOSIS — E87.1 HYPONATREMIA: ICD-10-CM

## 2022-09-12 DIAGNOSIS — E87.1 HYPONATREMIA: Primary | ICD-10-CM

## 2022-09-12 DIAGNOSIS — U07.1 INFECTION DUE TO 2019 NOVEL CORONAVIRUS: ICD-10-CM

## 2022-09-12 LAB
ALBUMIN SERPL BCG-MCNC: 4 G/DL (ref 3.5–5.2)
ALP SERPL-CCNC: 119 U/L (ref 40–129)
ALT SERPL W P-5'-P-CCNC: 8 U/L (ref 10–50)
ANION GAP SERPL CALCULATED.3IONS-SCNC: 8 MMOL/L (ref 7–15)
AST SERPL W P-5'-P-CCNC: 21 U/L (ref 10–50)
BILIRUB SERPL-MCNC: 0.3 MG/DL
BUN SERPL-MCNC: 32 MG/DL (ref 8–23)
CALCIUM SERPL-MCNC: 9.3 MG/DL (ref 8.8–10.2)
CHLORIDE SERPL-SCNC: 101 MMOL/L (ref 98–107)
CREAT SERPL-MCNC: 1.14 MG/DL (ref 0.67–1.17)
DEPRECATED HCO3 PLAS-SCNC: 27 MMOL/L (ref 22–29)
GFR SERPL CREATININE-BSD FRML MDRD: 67 ML/MIN/1.73M2
GLUCOSE SERPL-MCNC: 126 MG/DL (ref 70–99)
OSMOLALITY SERPL: 290 MMOL/KG (ref 280–301)
OSMOLALITY UR: 663 MMOL/KG (ref 100–1200)
POTASSIUM SERPL-SCNC: 5 MMOL/L (ref 3.4–5.3)
PROT SERPL-MCNC: 7 G/DL (ref 6.4–8.3)
SODIUM SERPL-SCNC: 136 MMOL/L (ref 136–145)
SODIUM UR-SCNC: 67 MMOL/L
TSH SERPL DL<=0.005 MIU/L-ACNC: 2 UIU/ML (ref 0.3–4.2)

## 2022-09-12 PROCEDURE — 84300 ASSAY OF URINE SODIUM: CPT | Performed by: INTERNAL MEDICINE

## 2022-09-12 PROCEDURE — 99214 OFFICE O/P EST MOD 30 MIN: CPT | Mod: CS | Performed by: INTERNAL MEDICINE

## 2022-09-12 PROCEDURE — 83930 ASSAY OF BLOOD OSMOLALITY: CPT | Performed by: INTERNAL MEDICINE

## 2022-09-12 PROCEDURE — 83935 ASSAY OF URINE OSMOLALITY: CPT | Performed by: INTERNAL MEDICINE

## 2022-09-12 PROCEDURE — 84443 ASSAY THYROID STIM HORMONE: CPT | Performed by: INTERNAL MEDICINE

## 2022-09-12 PROCEDURE — 80053 COMPREHEN METABOLIC PANEL: CPT | Performed by: PATHOLOGY

## 2022-09-12 PROCEDURE — 36415 COLL VENOUS BLD VENIPUNCTURE: CPT | Performed by: PATHOLOGY

## 2022-09-12 RX ORDER — NIRMATRELVIR AND RITONAVIR 150-100 MG
KIT ORAL
COMMUNITY
Start: 2022-09-01 | End: 2022-09-12

## 2022-09-12 NOTE — PROGRESS NOTES
History of Present Illness:  Mr. Walker is a 77 year old adult who presents for  Chief Complaint   Patient presents with     Follow Up     Four month follow-up     COVID-19     Reports course of PAXLOVID, 150/100, 10 x 150 MG & 10 x 100MG TBPK     Results     Lab results     PMH involving tobacco use, DM, HTN, CAD, PAD, Right Charcot foot, diabetic neuropathy, emphysema, tobacco use, anemia of chronic disease, MGUS.    Amos recently had COVID 9/1, rx paxlovid, tested neg yesterday  Wife was hospitalized after COVID, also took paxlovid  He denies dyspnea, some fatigue, some cough, no diarrhea  His Na was 126 last week, taking 5 mg lisinopril, states eating/drinking okay, drinking a lot when BP is low, denies other changes  Doesn't feel mentation is altered      Routine Health Maintenance  Immunizations:   Most Recent Immunizations   Administered Date(s) Administered     COVID-19,PF,Pfizer 05/06/2021     Influenza (High Dose) 3 valent vaccine 10/04/2019     Influenza (IIV3) PF 11/01/2013     Influenza, Quad, High Dose, Pf, 65yr + 10/05/2020     Pneumo Conj 13-V (2010&after) 11/03/2014     Pneumococcal 23 valent 12/21/2015     TDAP Vaccine (Boostrix) 03/21/2016      Lipids:   Recent Labs   Lab Test 09/02/20  0931 03/27/19  0934 11/18/14  0853 11/18/14  0853   CHOL 107 95   < > 110   HDL 69 38*   < > 45   LDL 22 49   < > 45   TRIG 80 40   < > 100   CHOLHDLRATIO  --   --   --  2.4    < > = values in this interval not displayed.      PSA (50-75 yrs): No results found for: PSA  DEXA: FRAX elevated, multiple fractures, qualifies for treatment, started alendronate 5/22  AAA Screening (65-75 yrs): CT 12/17, negative  Lung Ca Screening (>30 py 55-79 or >20 py 50-79 + RF): 7/20, 7/21  Colonoscopy (50-75 yrs): due 6/24, 6/19 Impression:          - The examined portion of the ileum was normal.                        - One 5 mm polyp in the cecum, removed with a cold snare                        and removed using injection-lift  and a cold snare.                        Resected and retrieved.                        - One 6 mm polyp in the transverse colon, removed with a                        hot snare and removed using injection-lift and a hot                        snare. Resected and retrieved. Clip was placed.                        - One 5 mm polyp in the sigmoid colon, removed with a                        hot snare. Resected and retrieved.                        - The examination was otherwise normal on direct and                        retroflexion views.                        NOTE: the polyp reported as being in the descending                        colon in the prior colonoscopy report appears on images                        in the transverse colon; that is where we removed a                        likely SSA. The descending colon was inspected on                        multiple occasions and no polyps were identified.   Recommendation:      - Return to referring physician.                        - Repeat colonoscopy in 5 years for surveillance.                                                                           HIV/HCV if risk factors: nonreactive HepC 6/16  Safety/Lifestyle: reviewed  Tob/EtOH: declines quitting  Depression:   PHQ-2 Score:     PHQ-2 ( 1999 Pfizer) 1/5/2021 5/18/2020   Q1: Little interest or pleasure in doing things 0 0   Q2: Feeling down, depressed or hopeless 1 1   PHQ-2 Score 1 1       Review of external notes as documented above                   A detailed Review of Systems was performed, verified and is negative except as documented in the HPI.  All health questionnaires were reviewed, verified and relevant information documented above.      Past Medical History:  Past Medical History:   Diagnosis Date     Anemia      CAD (coronary artery disease)     2V CAD involving LAD and RCA, s/p DESx4 in 3/18     CKD (chronic kidney disease) stage 3, GFR 30-59 ml/min (H)      Colon polyp      Diabetic Trudicot  foot (H)      Emphysema of lung (H)     noted on CT     Heart disease      HTN (hypertension)      Hyperlipidemia      MRSA cellulitis of right foot     in past.      Osteopenia of both hips      PAD (peripheral artery disease) (H) 09/2018    s/p R femoral enarterectomy and stenting      Tobacco use     50+ pack     Type 2 diabetes mellitus (H)     for 25 yrs.  on insulin and starlix     Venous ulcer (H)        Active Meds:  Current Outpatient Medications   Medication     acetaminophen (TYLENOL) 325 MG tablet     alendronate (FOSAMAX) 70 MG tablet     ammonium lactate (LAC-HYDRIN) 12 % external cream     ascorbic acid 500 MG TABS     aspirin 81 MG EC tablet     blood glucose monitoring (FREESTYLE) lancets     calcium carbonate (OS-CLAUDIA) 500 MG tablet     cephALEXin (KEFLEX) 500 MG capsule     cetirizine (ZYRTEC) 10 MG tablet     clindamycin (CLEOCIN) 300 MG capsule     clindamycin (CLEOCIN) 300 MG capsule     clindamycin (CLEOCIN) 300 MG capsule     clopidogrel (PLAVIX) 75 MG tablet     Continuous Blood Gluc  (FREESTYLE LATOYA 14 DAY READER) TANIA     continuous blood glucose monitoring (FREESTYLE LATOYA) sensor     dulaglutide (TRULICITY) 1.5 MG/0.5ML pen     ferrous sulfate (FEROSUL) 325 (65 Fe) MG tablet     gentamicin (GARAMYCIN) 0.1 % external cream     insulin lispro (HUMALOG KWIKPEN) 100 UNIT/ML (1 unit dial) KWIKPEN     insulin pen needle (B-D U/F) 31G X 8 MM miscellaneous     ketoconazole (NIZORAL) 2 % external shampoo     lisinopril (ZESTRIL) 2.5 MG tablet     ONETOUCH ULTRA test strip     silver sulfADIAZINE (SSD) 1 % external cream     simvastatin (ZOCOR) 40 MG tablet     triamcinolone (KENALOG) 0.025 % external ointment     triamcinolone (KENALOG) 0.1 % external cream     triamcinolone (KENALOG) 0.1 % external cream     triamcinolone (KENALOG) 0.1 % external ointment     VITAMIN D, CHOLECALCIFEROL, PO     No current facility-administered medications for this visit.        Allergies:  Reviewed, refer  "to EMR    Relevant Social History:  Social History     Tobacco Use     Smoking status: Current Every Day Smoker     Packs/day: 0.25     Years: 50.00     Pack years: 12.50     Types: Cigarettes     Smokeless tobacco: Never Used   Substance Use Topics     Alcohol use: No     Drug use: No        Physical Exam:  Vitals: BP (!) 146/64 (BP Location: Right arm, Patient Position: Sitting, Cuff Size: Adult Regular)   Pulse 89   Resp 18   Ht 1.892 m (6' 2.5\")   Wt 77.8 kg (171 lb 9.6 oz)   SpO2 99%   BMI 21.74 kg/m    Constitutional: Alert, oriented, pleasant, no acute distress, fatigued appearing  Head: Normocephalic, atraumatic  Eyes: Extra-ocular movements intact, pupils equally round bilaterally, no scleral icterus  ENT: Oropharynx clear, moist mucus membranes, good dentition  Neck: Supple, no lymphadenopathy  Cardiovascular: Regular rate and rhythm, no murmurs, rubs or gallops, peripheral pulses full/symmetric  Respiratory: Good air movement bilaterally, lungs clear, no wheezes/rales/rhonchi  Musculoskeletal: No edema, normal muscle tone, normal gait  Neurologic: Alert and oriented, cranial nerves 2-12 intact, grossly non-focal  Skin: No rashes/lesions  Psychiatric: normal mentation, affect and mood      Diagnostics:  Labs reviewed in Epic          Assessment and Plan:  Amos was seen today for follow up, covid-19 and results.    Diagnoses and all orders for this visit:    Hyponatremia  Unclear etiology, perhaps from excessive fluid intake. Appears euvolemic today with normal, possibly slightly slowed, mentation.   -     Comprehensive metabolic panel; Future  -     TSH; Future  -     Osmolality; Future  -     Sodium random urine; Future  -     Osmolality urine; Future    Infection due to 2019 novel coronavirus  Recovering          Racheal Swift MD  Internal Medicine    >30 minutes spent today performing chart review, history and exam, documentation and further activities as noted above exclusive of any procedures " or EKG interpretation

## 2022-09-12 NOTE — NURSING NOTE
"Amos Walker is a 77 year old male patient that presents today in clinic for the following:    Chief Complaint   Patient presents with     Follow Up     Four month follow-up     COVID-19     Reports course of PAXLOVID, 150/100, 10 x 150 MG & 10 x 100MG TBPK     Results     Lab results     The patient's allergies and medications were reviewed as noted. A set of vitals were recorded as noted without incident: BP (!) 146/64 (BP Location: Right arm, Patient Position: Sitting, Cuff Size: Adult Regular)   Pulse 89   Resp 18   Ht 1.892 m (6' 2.5\")   Wt 77.8 kg (171 lb 9.6 oz)   SpO2 99%   BMI 21.74 kg/m  . The patient does not have any other questions for the provider.    Kurt Mcfarlane, EMT at 11:29 AM on 9/12/2022  "

## 2022-09-13 ENCOUNTER — OFFICE VISIT (OUTPATIENT)
Dept: PODIATRY | Facility: CLINIC | Age: 77
End: 2022-09-13
Payer: COMMERCIAL

## 2022-09-13 DIAGNOSIS — L97.322 SKIN ULCER OF LEFT ANKLE WITH FAT LAYER EXPOSED (H): ICD-10-CM

## 2022-09-13 DIAGNOSIS — E11.51 DIABETES MELLITUS WITH PERIPHERAL VASCULAR DISEASE (H): ICD-10-CM

## 2022-09-13 DIAGNOSIS — E11.49 TYPE II OR UNSPECIFIED TYPE DIABETES MELLITUS WITH NEUROLOGICAL MANIFESTATIONS, NOT STATED AS UNCONTROLLED(250.60) (H): Primary | ICD-10-CM

## 2022-09-13 DIAGNOSIS — L97.521 SKIN ULCER OF LEFT FOOT, LIMITED TO BREAKDOWN OF SKIN (H): ICD-10-CM

## 2022-09-13 DIAGNOSIS — E11.610 CHARCOT FOOT DUE TO DIABETES MELLITUS (H): ICD-10-CM

## 2022-09-13 PROCEDURE — 99214 OFFICE O/P EST MOD 30 MIN: CPT | Performed by: PODIATRIST
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
## 2022-09-27 ENCOUNTER — OFFICE VISIT (OUTPATIENT)
Dept: PODIATRY | Facility: CLINIC | Age: 77
End: 2022-09-27
Payer: COMMERCIAL

## 2022-09-27 DIAGNOSIS — L97.521 SKIN ULCER OF LEFT FOOT, LIMITED TO BREAKDOWN OF SKIN (H): ICD-10-CM

## 2022-09-27 DIAGNOSIS — L97.322 SKIN ULCER OF LEFT ANKLE WITH FAT LAYER EXPOSED (H): ICD-10-CM

## 2022-09-27 DIAGNOSIS — B35.1 ONYCHOMYCOSIS: ICD-10-CM

## 2022-09-27 DIAGNOSIS — E11.51 DIABETES MELLITUS WITH PERIPHERAL VASCULAR DISEASE (H): ICD-10-CM

## 2022-09-27 DIAGNOSIS — I87.8 VENOUS STASIS: ICD-10-CM

## 2022-09-27 DIAGNOSIS — L84 TYLOMA: ICD-10-CM

## 2022-09-27 DIAGNOSIS — E11.610 CHARCOT FOOT DUE TO DIABETES MELLITUS (H): ICD-10-CM

## 2022-09-27 DIAGNOSIS — E11.49 TYPE II OR UNSPECIFIED TYPE DIABETES MELLITUS WITH NEUROLOGICAL MANIFESTATIONS, NOT STATED AS UNCONTROLLED(250.60) (H): Primary | ICD-10-CM

## 2022-09-27 PROCEDURE — 11720 DEBRIDE NAIL 1-5: CPT | Mod: XS | Performed by: PODIATRIST

## 2022-09-27 PROCEDURE — 99214 OFFICE O/P EST MOD 30 MIN: CPT | Mod: 25 | Performed by: PODIATRIST

## 2022-09-27 PROCEDURE — 11055 PARING/CUTG B9 HYPRKER LES 1: CPT | Performed by: PODIATRIST
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
## 2022-09-27 NOTE — NURSING NOTE
Amos Walker's chief complaint for this visit includes:  Chief Complaint   Patient presents with     Left Ankle - Wounds     Right Ankle - Wounds     PCP: Racheal Swift    Referring Provider:  No referring provider defined for this encounter.    There were no vitals taken for this visit.  Data Unavailable     Do you need any medication refills at today's visit? NO    Allergies   Allergen Reactions     Seasonal Allergies      Lisinopril Dizziness     Methylchloroisothiazolinone [Methylisothiazolinone] Rash     Neomycin      Wound gets worse     Povidone Iodine Rash       Conrado Ashby, EMT
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
## 2022-10-11 ENCOUNTER — OFFICE VISIT (OUTPATIENT)
Dept: PODIATRY | Facility: CLINIC | Age: 77
End: 2022-10-11
Payer: COMMERCIAL

## 2022-10-11 DIAGNOSIS — I87.8 VENOUS STASIS: ICD-10-CM

## 2022-10-11 DIAGNOSIS — L97.322 SKIN ULCER OF LEFT ANKLE WITH FAT LAYER EXPOSED (H): ICD-10-CM

## 2022-10-11 DIAGNOSIS — L97.521 SKIN ULCER OF LEFT FOOT, LIMITED TO BREAKDOWN OF SKIN (H): ICD-10-CM

## 2022-10-11 DIAGNOSIS — E11.51 DIABETES MELLITUS WITH PERIPHERAL VASCULAR DISEASE (H): ICD-10-CM

## 2022-10-11 DIAGNOSIS — E11.49 TYPE II OR UNSPECIFIED TYPE DIABETES MELLITUS WITH NEUROLOGICAL MANIFESTATIONS, NOT STATED AS UNCONTROLLED(250.60) (H): Primary | ICD-10-CM

## 2022-10-11 DIAGNOSIS — E11.610 CHARCOT FOOT DUE TO DIABETES MELLITUS (H): ICD-10-CM

## 2022-10-11 PROCEDURE — 99214 OFFICE O/P EST MOD 30 MIN: CPT | Performed by: PODIATRIST

## 2022-10-11 NOTE — NURSING NOTE
Amos Walker's chief complaint for this visit includes:  Chief Complaint   Patient presents with     Left Ankle - WOUND CARE     Right Ankle - Wound Check     PCP: Racheal Swift    Referring Provider:  No referring provider defined for this encounter.    There were no vitals taken for this visit.  Data Unavailable     Do you need any medication refills at today's visit? NO    Allergies   Allergen Reactions     Seasonal Allergies      Lisinopril Dizziness     Methylchloroisothiazolinone [Methylisothiazolinone] Rash     Neomycin      Wound gets worse     Povidone Iodine Rash       Conrado Ashby, EMT
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
## 2022-10-14 NOTE — TELEPHONE ENCOUNTER
Message left in mychart response.  Lauren Rg LPN      Patient is alert and oriented x4. C/o pain in left arm and foot related to rheumatoid arthritis; declined interventions at this time. Scattered bruising. Limited rotation of RUE. O2 saturation stable on room air. Dyspnea on exertion and occasional shortness of breath while at rest. Infrequent cough. Lung sounds wheezy throughout and coarse in bases. Patient ambulating SBA. Voiding without difficulty.

## 2022-10-19 ENCOUNTER — TELEPHONE (OUTPATIENT)
Dept: AUDIOLOGY | Facility: CLINIC | Age: 77
End: 2022-10-19

## 2022-10-19 NOTE — TELEPHONE ENCOUNTER
Walk-in hearing aid services on 10/19/22: The patient reported his left hearing aid wasn't working.  Examination determined the  in the cShell was dead.  The hearing aid was determined to be working normally.  The cShell is being sent to the  for repair and the hearing aid is being held at the clinic.  Once the repaired cShell returns, it will be attached to the hearing aid and the patient will be contacted via email to .  He may schedule an appointment with an audiologist as he feels his devices need to be adjusted.

## 2022-10-20 DIAGNOSIS — E11.40 TYPE 2 DIABETES, CONTROLLED, WITH NEUROPATHY (H): ICD-10-CM

## 2022-10-24 DIAGNOSIS — E11.40 TYPE 2 DIABETES, CONTROLLED, WITH NEUROPATHY (H): ICD-10-CM

## 2022-10-24 RX ORDER — DULAGLUTIDE 1.5 MG/.5ML
INJECTION, SOLUTION SUBCUTANEOUS
Qty: 6 ML | Refills: 1 | Status: SHIPPED | OUTPATIENT
Start: 2022-10-24 | End: 2023-03-30

## 2022-10-25 ENCOUNTER — OFFICE VISIT (OUTPATIENT)
Dept: PODIATRY | Facility: CLINIC | Age: 77
End: 2022-10-25
Payer: COMMERCIAL

## 2022-10-25 DIAGNOSIS — E11.610 CHARCOT FOOT DUE TO DIABETES MELLITUS (H): ICD-10-CM

## 2022-10-25 DIAGNOSIS — L97.322 SKIN ULCER OF LEFT ANKLE WITH FAT LAYER EXPOSED (H): ICD-10-CM

## 2022-10-25 DIAGNOSIS — E11.51 DIABETES MELLITUS WITH PERIPHERAL VASCULAR DISEASE (H): ICD-10-CM

## 2022-10-25 DIAGNOSIS — E11.49 TYPE II OR UNSPECIFIED TYPE DIABETES MELLITUS WITH NEUROLOGICAL MANIFESTATIONS, NOT STATED AS UNCONTROLLED(250.60) (H): Primary | ICD-10-CM

## 2022-10-25 DIAGNOSIS — L97.521 SKIN ULCER OF LEFT FOOT, LIMITED TO BREAKDOWN OF SKIN (H): ICD-10-CM

## 2022-10-25 DIAGNOSIS — I87.8 VENOUS STASIS: ICD-10-CM

## 2022-10-25 DIAGNOSIS — L97.912 ULCER OF RIGHT LOWER EXTREMITY WITH FAT LAYER EXPOSED (H): ICD-10-CM

## 2022-10-25 PROCEDURE — 99215 OFFICE O/P EST HI 40 MIN: CPT | Performed by: PODIATRIST

## 2022-10-25 RX ORDER — DULAGLUTIDE 1.5 MG/.5ML
INJECTION, SOLUTION SUBCUTANEOUS
Qty: 6 ML | Refills: 1 | OUTPATIENT
Start: 2022-10-25

## 2022-10-25 RX ORDER — GENTAMICIN SULFATE 1 MG/G
CREAM TOPICAL DAILY
Qty: 30 G | Refills: 0 | Status: SHIPPED | OUTPATIENT
Start: 2022-10-25 | End: 2022-12-06

## 2022-10-25 RX ORDER — CEPHALEXIN 500 MG/1
500 CAPSULE ORAL 2 TIMES DAILY
Qty: 28 CAPSULE | Refills: 0 | Status: SHIPPED | OUTPATIENT
Start: 2022-10-25 | End: 2023-02-03
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
## 2022-10-25 NOTE — NURSING NOTE
Amos Walker's chief complaint for this visit includes:  Chief Complaint   Patient presents with     Follow Up     Left ankle ulcer     PCP: Racheal Swift    Referring Provider:  No referring provider defined for this encounter.    There were no vitals taken for this visit.  Data Unavailable        Allergies   Allergen Reactions     Seasonal Allergies      Lisinopril Dizziness     Methylchloroisothiazolinone [Methylisothiazolinone] Rash     Neomycin      Wound gets worse     Povidone Iodine Rash         Do you need any medication refills at today's visit?

## 2022-11-08 ENCOUNTER — OFFICE VISIT (OUTPATIENT)
Dept: AUDIOLOGY | Facility: CLINIC | Age: 77
End: 2022-11-08
Payer: COMMERCIAL

## 2022-11-08 ENCOUNTER — OFFICE VISIT (OUTPATIENT)
Dept: PODIATRY | Facility: CLINIC | Age: 77
End: 2022-11-08
Payer: COMMERCIAL

## 2022-11-08 DIAGNOSIS — L97.322 SKIN ULCER OF LEFT ANKLE WITH FAT LAYER EXPOSED (H): ICD-10-CM

## 2022-11-08 DIAGNOSIS — L97.912 ULCER OF RIGHT LOWER EXTREMITY WITH FAT LAYER EXPOSED (H): ICD-10-CM

## 2022-11-08 DIAGNOSIS — E11.49 TYPE II OR UNSPECIFIED TYPE DIABETES MELLITUS WITH NEUROLOGICAL MANIFESTATIONS, NOT STATED AS UNCONTROLLED(250.60) (H): Primary | ICD-10-CM

## 2022-11-08 DIAGNOSIS — H90.3 SENSORY HEARING LOSS, BILATERAL: Primary | ICD-10-CM

## 2022-11-08 DIAGNOSIS — E11.610 CHARCOT FOOT DUE TO DIABETES MELLITUS (H): ICD-10-CM

## 2022-11-08 DIAGNOSIS — E11.51 DIABETES MELLITUS WITH PERIPHERAL VASCULAR DISEASE (H): ICD-10-CM

## 2022-11-08 PROCEDURE — V5264 EAR MOLD/INSERT: HCPCS | Mod: LT | Performed by: AUDIOLOGIST

## 2022-11-08 PROCEDURE — 99214 OFFICE O/P EST MOD 30 MIN: CPT | Performed by: PODIATRIST
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
## 2022-11-08 NOTE — NURSING NOTE
Amos Walker's chief complaint for this visit includes:  Chief Complaint   Patient presents with     Follow Up     Bilateral ulcers     PCP: Racheal Swift    Referring Provider:  No referring provider defined for this encounter.    There were no vitals taken for this visit.  Data Unavailable        Allergies   Allergen Reactions     Seasonal Allergies      Lisinopril Dizziness     Methylchloroisothiazolinone [Methylisothiazolinone] Rash     Neomycin      Wound gets worse     Povidone Iodine Rash         Do you need any medication refills at today's visit?
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
## 2022-11-18 ENCOUNTER — LAB (OUTPATIENT)
Dept: LAB | Facility: CLINIC | Age: 77
End: 2022-11-18
Payer: COMMERCIAL

## 2022-11-18 DIAGNOSIS — D47.2 MGUS (MONOCLONAL GAMMOPATHY OF UNKNOWN SIGNIFICANCE): ICD-10-CM

## 2022-11-18 LAB
ALBUMIN SERPL-MCNC: 3.5 G/DL (ref 3.4–5)
ALP SERPL-CCNC: 123 U/L (ref 40–150)
ALT SERPL W P-5'-P-CCNC: 17 U/L (ref 0–70)
ANION GAP SERPL CALCULATED.3IONS-SCNC: 4 MMOL/L (ref 3–14)
AST SERPL W P-5'-P-CCNC: 30 U/L (ref 0–45)
BASOPHILS # BLD AUTO: 0.1 10E3/UL (ref 0–0.2)
BASOPHILS NFR BLD AUTO: 1 %
BILIRUB SERPL-MCNC: 0.5 MG/DL (ref 0.2–1.3)
BUN SERPL-MCNC: 22 MG/DL (ref 7–30)
CALCIUM SERPL-MCNC: 9 MG/DL (ref 8.5–10.1)
CHLORIDE BLD-SCNC: 108 MMOL/L (ref 94–109)
CO2 SERPL-SCNC: 28 MMOL/L (ref 20–32)
CREAT SERPL-MCNC: 1.08 MG/DL (ref 0.66–1.25)
EOSINOPHIL # BLD AUTO: 0.2 10E3/UL (ref 0–0.7)
EOSINOPHIL NFR BLD AUTO: 3 %
ERYTHROCYTE [DISTWIDTH] IN BLOOD BY AUTOMATED COUNT: 13.4 % (ref 10–15)
GFR SERPL CREATININE-BSD FRML MDRD: 72 ML/MIN/1.73M2
GLUCOSE BLD-MCNC: 84 MG/DL (ref 70–99)
HCT VFR BLD AUTO: 39.1 % (ref 40–53)
HGB BLD-MCNC: 12.8 G/DL (ref 13.3–17.7)
LDH SERPL L TO P-CCNC: 192 U/L (ref 85–227)
LYMPHOCYTES # BLD AUTO: 0.9 10E3/UL (ref 0.8–5.3)
LYMPHOCYTES NFR BLD AUTO: 15 %
MCH RBC QN AUTO: 32.2 PG (ref 26.5–33)
MCHC RBC AUTO-ENTMCNC: 32.7 G/DL (ref 31.5–36.5)
MCV RBC AUTO: 98 FL (ref 78–100)
MONOCYTES # BLD AUTO: 0.5 10E3/UL (ref 0–1.3)
MONOCYTES NFR BLD AUTO: 8 %
NEUTROPHILS # BLD AUTO: 4.5 10E3/UL (ref 1.6–8.3)
NEUTROPHILS NFR BLD AUTO: 74 %
PLATELET # BLD AUTO: 206 10E3/UL (ref 150–450)
POTASSIUM BLD-SCNC: 3.9 MMOL/L (ref 3.4–5.3)
PROT SERPL-MCNC: 7.3 G/DL (ref 6.8–8.8)
RBC # BLD AUTO: 3.98 10E6/UL (ref 4.4–5.9)
SODIUM SERPL-SCNC: 140 MMOL/L (ref 133–144)
TOTAL PROTEIN SERUM FOR ELP: 6.7 G/DL (ref 6.4–8.3)
URATE SERPL-MCNC: 5.8 MG/DL (ref 3.5–7.2)
VIT B12 SERPL-MCNC: 642 PG/ML (ref 232–1245)
WBC # BLD AUTO: 6.1 10E3/UL (ref 4–11)

## 2022-11-18 PROCEDURE — 84165 PROTEIN E-PHORESIS SERUM: CPT

## 2022-11-18 PROCEDURE — 80053 COMPREHEN METABOLIC PANEL: CPT

## 2022-11-18 PROCEDURE — 84155 ASSAY OF PROTEIN SERUM: CPT | Mod: 59

## 2022-11-18 PROCEDURE — 83615 LACTATE (LD) (LDH) ENZYME: CPT

## 2022-11-18 PROCEDURE — 82784 ASSAY IGA/IGD/IGG/IGM EACH: CPT

## 2022-11-18 PROCEDURE — 83521 IG LIGHT CHAINS FREE EACH: CPT

## 2022-11-18 PROCEDURE — 36415 COLL VENOUS BLD VENIPUNCTURE: CPT

## 2022-11-18 PROCEDURE — 82607 VITAMIN B-12: CPT

## 2022-11-18 PROCEDURE — 85025 COMPLETE CBC W/AUTO DIFF WBC: CPT

## 2022-11-18 PROCEDURE — 86335 IMMUNFIX E-PHORSIS/URINE/CSF: CPT

## 2022-11-18 PROCEDURE — 84550 ASSAY OF BLOOD/URIC ACID: CPT

## 2022-11-19 ENCOUNTER — HEALTH MAINTENANCE LETTER (OUTPATIENT)
Age: 77
End: 2022-11-19

## 2022-11-21 LAB
ALBUMIN SERPL ELPH-MCNC: 3.8 G/DL (ref 3.7–5.1)
ALPHA1 GLOB SERPL ELPH-MCNC: 0.2 G/DL (ref 0.2–0.4)
ALPHA2 GLOB SERPL ELPH-MCNC: 0.7 G/DL (ref 0.5–0.9)
B-GLOBULIN SERPL ELPH-MCNC: 0.7 G/DL (ref 0.6–1)
GAMMA GLOB SERPL ELPH-MCNC: 1.2 G/DL (ref 0.7–1.6)
IGA SERPL-MCNC: 330 MG/DL (ref 84–499)
IGG SERPL-MCNC: 1325 MG/DL (ref 610–1616)
IGM SERPL-MCNC: 22 MG/DL (ref 35–242)
KAPPA LC FREE SER-MCNC: 5.45 MG/DL (ref 0.33–1.94)
KAPPA LC FREE/LAMBDA FREE SER NEPH: 2.28 {RATIO} (ref 0.26–1.65)
LAMBDA LC FREE SERPL-MCNC: 2.39 MG/DL (ref 0.57–2.63)
M PROTEIN SERPL ELPH-MCNC: 0.1 G/DL
PROT ELPH PNL UR ELPH: NORMAL
PROT PATTERN SERPL ELPH-IMP: ABNORMAL

## 2022-11-22 ENCOUNTER — OFFICE VISIT (OUTPATIENT)
Dept: PODIATRY | Facility: CLINIC | Age: 77
End: 2022-11-22
Payer: COMMERCIAL

## 2022-11-22 DIAGNOSIS — L97.322 SKIN ULCER OF LEFT ANKLE WITH FAT LAYER EXPOSED (H): ICD-10-CM

## 2022-11-22 DIAGNOSIS — L08.9 INFECTED ABRASION OF LEFT LOWER EXTREMITY, SEQUELA: ICD-10-CM

## 2022-11-22 DIAGNOSIS — E11.51 DIABETES MELLITUS WITH PERIPHERAL VASCULAR DISEASE (H): ICD-10-CM

## 2022-11-22 DIAGNOSIS — L84 TYLOMA: ICD-10-CM

## 2022-11-22 DIAGNOSIS — E11.49 TYPE II OR UNSPECIFIED TYPE DIABETES MELLITUS WITH NEUROLOGICAL MANIFESTATIONS, NOT STATED AS UNCONTROLLED(250.60) (H): Primary | ICD-10-CM

## 2022-11-22 DIAGNOSIS — S80.812S INFECTED ABRASION OF LEFT LOWER EXTREMITY, SEQUELA: ICD-10-CM

## 2022-11-22 DIAGNOSIS — E11.610 CHARCOT FOOT DUE TO DIABETES MELLITUS (H): ICD-10-CM

## 2022-11-22 PROCEDURE — 99215 OFFICE O/P EST HI 40 MIN: CPT | Performed by: PODIATRIST
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
## 2022-11-29 ENCOUNTER — PATIENT OUTREACH (OUTPATIENT)
Dept: ONCOLOGY | Facility: CLINIC | Age: 77
End: 2022-11-29

## 2022-11-29 ENCOUNTER — OFFICE VISIT (OUTPATIENT)
Dept: PODIATRY | Facility: CLINIC | Age: 77
End: 2022-11-29
Payer: COMMERCIAL

## 2022-11-29 DIAGNOSIS — L97.322 SKIN ULCER OF LEFT ANKLE WITH FAT LAYER EXPOSED (H): ICD-10-CM

## 2022-11-29 DIAGNOSIS — S80.812S INFECTED ABRASION OF LEFT LOWER EXTREMITY, SEQUELA: ICD-10-CM

## 2022-11-29 DIAGNOSIS — E11.51 DIABETES MELLITUS WITH PERIPHERAL VASCULAR DISEASE (H): ICD-10-CM

## 2022-11-29 DIAGNOSIS — E11.49 TYPE II OR UNSPECIFIED TYPE DIABETES MELLITUS WITH NEUROLOGICAL MANIFESTATIONS, NOT STATED AS UNCONTROLLED(250.60) (H): Primary | ICD-10-CM

## 2022-11-29 DIAGNOSIS — E11.610 CHARCOT FOOT DUE TO DIABETES MELLITUS (H): ICD-10-CM

## 2022-11-29 DIAGNOSIS — L08.9 INFECTED ABRASION OF LEFT LOWER EXTREMITY, SEQUELA: ICD-10-CM

## 2022-11-29 PROCEDURE — 99214 OFFICE O/P EST MOD 30 MIN: CPT | Performed by: PODIATRIST

## 2022-11-29 NOTE — NURSING NOTE
Amos Walker's chief complaint for this visit includes:  Chief Complaint   Patient presents with     Follow Up     Right and left ankle ulcer     PCP: Racheal Swift    Referring Provider:  No referring provider defined for this encounter.    There were no vitals taken for this visit.  Data Unavailable        Allergies   Allergen Reactions     Seasonal Allergies      Lisinopril Dizziness     Methylchloroisothiazolinone [Methylisothiazolinone] Rash     Neomycin      Wound gets worse     Povidone Iodine Rash         Do you need any medication refills at today's visit?
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
## 2022-11-30 ENCOUNTER — VIRTUAL VISIT (OUTPATIENT)
Dept: TRANSPLANT | Facility: CLINIC | Age: 77
End: 2022-11-30
Attending: INTERNAL MEDICINE
Payer: COMMERCIAL

## 2022-11-30 DIAGNOSIS — D47.2 MGUS (MONOCLONAL GAMMOPATHY OF UNKNOWN SIGNIFICANCE): Primary | ICD-10-CM

## 2022-11-30 DIAGNOSIS — I73.9 PVD (PERIPHERAL VASCULAR DISEASE) (H): ICD-10-CM

## 2022-11-30 PROCEDURE — 99213 OFFICE O/P EST LOW 20 MIN: CPT | Mod: 95 | Performed by: INTERNAL MEDICINE

## 2022-11-30 NOTE — LETTER
11/30/2022         RE: Amos Walker  [address] [facility] [address]        Dear Colleague,    Thank you for referring your patient, Amos Walker, to the [facility] [place]. Please see a copy of my visit note below.    Amos is a 77 year old who is being evaluated via a billable video visit.  Currently in [facility].    How would you like to obtain your AVS? MyChart  If the video visit is dropped, the invitation should be resent by: Text to cell phone: [phone]  Will anyone else be joining your video visit? Yesenia Chinchilla      HISTORY OF PRESENT ILLNESS:   Mr. Walker is a 76-year-old gentleman referred for evaluation of anemia and monoclonal gammopathy.  He has a complicated medical history highlighted by type 2 diabetes, hypertension, coronary and peripheral artery disease, Charcot foot, chronic leg ulcers, chronic kidney disease and history of heavy tobacco use.  He has been noted to be anemic for quite a while.  He tells me that he was anemic several years ago when he was in [place].  He had some blood in the stool.  He did have colonoscopies done.  In reviewing his chart, he has had hemoglobins in the low 9/high 8 range on and off since 2017.  Occasionally his hemoglobin gets a little bit higher.  His most recent hemoglobin on 02/05 was 9.0.  He denies any active bleeding now.  He does have foot ulcers.  He has had a lot of problems with infections.  He has had a history of MRSA involving his foot which he feels led ultimately to his Charcot foot and his diabetic ulcers.  He has not had a history of B12 deficiency.  He was noted in 2019 to have a monoclonal peak of 0.2.  There were no light chains in the urine.  His immunoglobulin levels were elevated in IgA and IgM.  His IgA was 604 and IgG was 1890.  There is an immunofixation of the monoclonal peak, which showed a free light chain and kappa chain type in his serum and an IgG kappa  also in the serum.  He had a rather severe infection in 01/2020 with sepsis, acute metabolic encephalopathy, respiratory failure secondary to Legionella community-acquired pneumonia.  He was treated aggressively with antibiotics and ultimately was discharged.  He has had issues with type 2 diabetes, on Lantus and Humalog.  He also has had hypertension.  He sees Dr. Mcclain regularly for his right foot ulcer about every 2 weeks.  He has a Charcot foot.  He denies any fever or chills.  He denies any nausea, vomiting or diarrhea.     INTERVAL HISTORY  Still has issues with ulceration of left ankle followed by  Dr Mcclain in Podiatry, in fact saw him yesterday.He put hydrofera Blue and gentamicin on skin of shin Needs a new hearing aid. He had COVID infection in April 2022. He has boosters.Still has trouble walking due to Charcot food. He has some dyspnea on exertion and is deconditioned      PAST MEDICAL HISTORY:  Remarkable for coronary artery disease, chronic kidney disease, emphysema, heart disease, hyperlipidemia, peripheral artery disease, type 2 diabetes, venous ulcerations, chronic kidney disease, hypertension, atherosclerosis of native arteries of right leg, Charcot foot due to diabetes, angioplasty, a left total hip replacement, a femoral endarterectomy on the right, a cervical disk fusion, a right foot orthopedic surgery, and bilateral cataract surgery.      FAMILY HISTORY:  Remarkable for his father having colon cancer and kidney disease.  Mother had kidney and cardiac issues.  His son had a myocardial infarction at age 40.  His brother has macular degeneration.      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  He smokes 2 packs per day for at least 25 years.  No alcohol use.  He is .  He has 4 children.      MEDICATIONS:  See EMR.      REVIEW OF SYSTEMS:   12 pt ROS done and is negative  Except as in HPI     ALLERGIES:  Lisinopril, neomycin, methylchloroisothiazolinone,  povidone-iodine.        PHYSICAL EXAMINATION:  PHYSICAL EXAMINATION:  By the video, he is an alert gentleman, verbal, in no acute distress.     EYES:  Grossly normal to inspection, no discharge, erythema or icterus.   SKIN:  Visible skin is clear.  No significant rash or abnormal pigmentation.   NEUROLOGIC:  Cranial nerves seem to be intact.  Mentation and speech is appropriate for age.   PSYCHIATRIC:  Mentation appears normal.  He does not seem anxious today.         Latest Reference Range & Units 11/18/22 12:00   Sodium 133 - 144 mmol/L 140   Potassium 3.4 - 5.3 mmol/L 3.9   Chloride 94 - 109 mmol/L 108   Carbon Dioxide 20 - 32 mmol/L 28   Urea Nitrogen 7 - 30 mg/dL 22   Creatinine 0.66 - 1.25 mg/dL 1.08   GFR Estimate >60 mL/min/1.73m2 72   Calcium 8.5 - 10.1 mg/dL 9.0   Anion Gap 3 - 14 mmol/L 4   Albumin 3.4 - 5.0 g/dL 3.5   Protein Total 6.8 - 8.8 g/dL 7.3   Alkaline Phosphatase 40 - 150 U/L 123   ALT 0 - 70 U/L 17   AST 0 - 45 U/L 30   Bilirubin Total 0.2 - 1.3 mg/dL 0.5   Lactate Dehydrogenase 85 - 227 U/L 192   Uric Acid 3.5 - 7.2 mg/dL 5.8   Vitamin B12 232 - 1,245 pg/mL 642   Glucose 70 - 99 mg/dL 84   WBC 4.0 - 11.0 10e3/uL 6.1   Hemoglobin 13.3 - 17.7 g/dL 12.8 (L)   Hematocrit 40.0 - 53.0 % 39.1 (L)   Platelet Count 150 - 450 10e3/uL 206   RBC Count 4.40 - 5.90 10e6/uL 3.98 (L)   MCV 78 - 100 fL 98   MCH 26.5 - 33.0 pg 32.2   MCHC 31.5 - 36.5 g/dL 32.7   RDW 10.0 - 15.0 % 13.4   % Neutrophils % 74   % Lymphocytes % 15   % Monocytes % 8   % Eosinophils % 3   % Basophils % 1   Absolute Basophils 0.0 - 0.2 10e3/uL 0.1   Absolute Eosinophils 0.0 - 0.7 10e3/uL 0.2   Absolute Lymphocytes 0.8 - 5.3 10e3/uL 0.9   Absolute Monocytes 0.0 - 1.3 10e3/uL 0.5   Absolute Neutrophils 1.6 - 8.3 10e3/uL 4.5   Albumin Fraction 3.7 - 5.1 g/dL 3.8   Alpha 1 Fraction 0.2 - 0.4 g/dL 0.2   Alpha 2 Fraction 0.5 - 0.9 g/dL 0.7   Beta Fraction 0.6 - 1.0 g/dL 0.7   ELP Interpretation:  Possible very small monoclonal protein  (about 0.1 g/dL) seen in the gamma fraction which could be either the IgG kappa or the free kappa light chain monoclonal there were previously characterized in our laboratory on 7/10/2019. Consider a serum immunofixation for confirmation if clinically indicated.  Pathologic significance requires clinical correlation.  SIGRID Forte M.D., Ph.D., Pathologist ().   Gamma Fraction 0.7 - 1.6 g/dL 1.2   IGA 84 - 499 mg/dL 330    - 1,616 mg/dL 1,325   IGM 35 - 242 mg/dL 22 (L)   Immunofix ELP Urine  No monoclonal protein seen on immunofixation. Pathologic significance requires clinical correlation.  SIGRID Forte M.D., Ph.D., Pathologist ()   Kappa Free Lt Chain 0.33 - 1.94 mg/dL 5.45 (H)   Kappa Lambda Ratio 0.26 - 1.65  2.28 (H)   Lambda Free Lt Chain 0.57 - 2.63 mg/dL 2.39   Monoclonal Peak <=0.0 g/dL 0.1 (H)   Total Protein Serum for ELP 6.4 - 8.3 g/dL 6.7   (L): Data is abnormally low  (H): Data is abnormally high    1.  Chronic anemia, likely anemia of chronic inflammation.   2.  Monoclonal gammopathy of uncertain significance.   3.  Type 2 diabetes.   4.  Coronary artery disease.   5.  Peripheral artery disease.   6.  Charcot foot.  7.   Smoking     . His hemoglobin is increased to 12.8  Maybe due to less inflammation in his foot. I explained the MGUS is common in individuals over 70. May be related to his chronic inflammation but no evidence for multiple myeloma , no CRAB criteria really met.Kappa chains are the same as last year. Will repeat labs in 12 mo and see him then Cancel the 24 urine collection this year.      RTC 1 year  With labs  I spent a total of 20 minutes on the video and reviewing the charg with Amos Walker during today's office visit. Over 50% of this time was spent counseling the patient and coordinating the care regarding anemia and MGUS          Again, thank you for allowing me to participate in the care of your patient.      Sincerely,    Kaleb MAN  MD [place]

## 2022-11-30 NOTE — PROGRESS NOTES
Aoms is a 77 year old who is being evaluated via a billable video visit.  Currently in [facility].    How would you like to obtain your AVS? MyChart  If the video visit is dropped, the invitation should be resent by: Text to cell phone: [phone]  Will anyone else be joining your video visit? Yesenia Chinchilla      HISTORY OF PRESENT ILLNESS:   Mr. Walker is a 76-year-old gentleman referred for evaluation of anemia and monoclonal gammopathy.  He has a complicated medical history highlighted by type 2 diabetes, hypertension, coronary and peripheral artery disease, Charcot foot, chronic leg ulcers, chronic kidney disease and history of heavy tobacco use.  He has been noted to be anemic for quite a while.  He tells me that he was anemic several years ago when he was in [place].  He had some blood in the stool.  He did have colonoscopies done.  In reviewing his chart, he has had hemoglobins in the low 9/high 8 range on and off since 2017.  Occasionally his hemoglobin gets a little bit higher.  His most recent hemoglobin on 02/05 was 9.0.  He denies any active bleeding now.  He does have foot ulcers.  He has had a lot of problems with infections.  He has had a history of MRSA involving his foot which he feels led ultimately to his Charcot foot and his diabetic ulcers.  He has not had a history of B12 deficiency.  He was noted in 2019 to have a monoclonal peak of 0.2.  There were no light chains in the urine.  His immunoglobulin levels were elevated in IgA and IgM.  His IgA was 604 and IgG was 1890.  There is an immunofixation of the monoclonal peak, which showed a free light chain and kappa chain type in his serum and an IgG kappa also in the serum.  He had a rather severe infection in 01/2020 with sepsis, acute metabolic encephalopathy, respiratory failure secondary to Legionella community-acquired pneumonia.  He was treated aggressively with antibiotics and ultimately was discharged.  He has had issues with  type 2 diabetes, on Lantus and Humalog.  He also has had hypertension.  He sees Dr. Mcclain regularly for his right foot ulcer about every 2 weeks.  He has a Charcot foot.  He denies any fever or chills.  He denies any nausea, vomiting or diarrhea.     INTERVAL HISTORY  Still has issues with ulceration of left ankle followed by  Dr Mcclain in Podiatry, in fact saw him yesterday.He put hydrofera Blue and gentamicin on skin of shin Needs a new hearing aid. He had COVID infection in April 2022. He has boosters.Still has trouble walking due to Charcot food. He has some dyspnea on exertion and is deconditioned      PAST MEDICAL HISTORY:  Remarkable for coronary artery disease, chronic kidney disease, emphysema, heart disease, hyperlipidemia, peripheral artery disease, type 2 diabetes, venous ulcerations, chronic kidney disease, hypertension, atherosclerosis of native arteries of right leg, Charcot foot due to diabetes, angioplasty, a left total hip replacement, a femoral endarterectomy on the right, a cervical disk fusion, a right foot orthopedic surgery, and bilateral cataract surgery.      FAMILY HISTORY:  Remarkable for his father having colon cancer and kidney disease.  Mother had kidney and cardiac issues.  His son had a myocardial infarction at age 40.  His brother has macular degeneration.      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  He smokes 2 packs per day for at least 25 years.  No alcohol use.  He is .  He has 4 children.      MEDICATIONS:  See EMR.      REVIEW OF SYSTEMS:   12 pt ROS done and is negative  Except as in HPI     ALLERGIES:  Lisinopril, neomycin, methylchloroisothiazolinone, povidone-iodine.        PHYSICAL EXAMINATION:  PHYSICAL EXAMINATION:  By the video, he is an alert gentleman, verbal, in no acute distress.     EYES:  Grossly normal to inspection, no discharge, erythema or icterus.   SKIN:  Visible skin is clear.  No significant rash or abnormal pigmentation.    NEUROLOGIC:  Cranial nerves seem to be intact.  Mentation and speech is appropriate for age.   PSYCHIATRIC:  Mentation appears normal.  He does not seem anxious today.         Latest Reference Range & Units 11/18/22 12:00   Sodium 133 - 144 mmol/L 140   Potassium 3.4 - 5.3 mmol/L 3.9   Chloride 94 - 109 mmol/L 108   Carbon Dioxide 20 - 32 mmol/L 28   Urea Nitrogen 7 - 30 mg/dL 22   Creatinine 0.66 - 1.25 mg/dL 1.08   GFR Estimate >60 mL/min/1.73m2 72   Calcium 8.5 - 10.1 mg/dL 9.0   Anion Gap 3 - 14 mmol/L 4   Albumin 3.4 - 5.0 g/dL 3.5   Protein Total 6.8 - 8.8 g/dL 7.3   Alkaline Phosphatase 40 - 150 U/L 123   ALT 0 - 70 U/L 17   AST 0 - 45 U/L 30   Bilirubin Total 0.2 - 1.3 mg/dL 0.5   Lactate Dehydrogenase 85 - 227 U/L 192   Uric Acid 3.5 - 7.2 mg/dL 5.8   Vitamin B12 232 - 1,245 pg/mL 642   Glucose 70 - 99 mg/dL 84   WBC 4.0 - 11.0 10e3/uL 6.1   Hemoglobin 13.3 - 17.7 g/dL 12.8 (L)   Hematocrit 40.0 - 53.0 % 39.1 (L)   Platelet Count 150 - 450 10e3/uL 206   RBC Count 4.40 - 5.90 10e6/uL 3.98 (L)   MCV 78 - 100 fL 98   MCH 26.5 - 33.0 pg 32.2   MCHC 31.5 - 36.5 g/dL 32.7   RDW 10.0 - 15.0 % 13.4   % Neutrophils % 74   % Lymphocytes % 15   % Monocytes % 8   % Eosinophils % 3   % Basophils % 1   Absolute Basophils 0.0 - 0.2 10e3/uL 0.1   Absolute Eosinophils 0.0 - 0.7 10e3/uL 0.2   Absolute Lymphocytes 0.8 - 5.3 10e3/uL 0.9   Absolute Monocytes 0.0 - 1.3 10e3/uL 0.5   Absolute Neutrophils 1.6 - 8.3 10e3/uL 4.5   Albumin Fraction 3.7 - 5.1 g/dL 3.8   Alpha 1 Fraction 0.2 - 0.4 g/dL 0.2   Alpha 2 Fraction 0.5 - 0.9 g/dL 0.7   Beta Fraction 0.6 - 1.0 g/dL 0.7   ELP Interpretation:  Possible very small monoclonal protein (about 0.1 g/dL) seen in the gamma fraction which could be either the IgG kappa or the free kappa light chain monoclonal there were previously characterized in our laboratory on 7/10/2019. Consider a serum immunofixation for confirmation if clinically indicated.  Pathologic significance requires  clinical correlation.  SIGRID Forte M.D., Ph.D., Pathologist ().   Gamma Fraction 0.7 - 1.6 g/dL 1.2   IGA 84 - 499 mg/dL 330    - 1,616 mg/dL 1,325   IGM 35 - 242 mg/dL 22 (L)   Immunofix ELP Urine  No monoclonal protein seen on immunofixation. Pathologic significance requires clinical correlation.  SIGRID Forte M.D., Ph.D., Pathologist ()   Kappa Free Lt Chain 0.33 - 1.94 mg/dL 5.45 (H)   Kappa Lambda Ratio 0.26 - 1.65  2.28 (H)   Lambda Free Lt Chain 0.57 - 2.63 mg/dL 2.39   Monoclonal Peak <=0.0 g/dL 0.1 (H)   Total Protein Serum for ELP 6.4 - 8.3 g/dL 6.7   (L): Data is abnormally low  (H): Data is abnormally high    1.  Chronic anemia, likely anemia of chronic inflammation.   2.  Monoclonal gammopathy of uncertain significance.   3.  Type 2 diabetes.   4.  Coronary artery disease.   5.  Peripheral artery disease.   6.  Charcot foot.  7.   Smoking     . His hemoglobin is increased to 12.8  Maybe due to less inflammation in his foot. I explained the MGUS is common in individuals over 70. May be related to his chronic inflammation but no evidence for multiple myeloma , no CRAB criteria really met.Kappa chains are the same as last year. Will repeat labs in 12 mo and see him then Cancel the 24 urine collection this year.      RTC 1 year  With labs  I spent a total of 20 minutes on the video and reviewing the charg with Amos Walker during today's office visit. Over 50% of this time was spent counseling the patient and coordinating the care regarding anemia and MGUS  Kaleb Ramirez MD             Video Start Time: 1255    Type of service:  Video Visit    Video End Time: 120    Originating Location (pt. Location): home        Distant Location (provider location):  home    Platform used for Video Visit: Soft[employer]

## 2022-12-01 ENCOUNTER — MYC MEDICAL ADVICE (OUTPATIENT)
Dept: INTERNAL MEDICINE | Facility: CLINIC | Age: 77
End: 2022-12-01

## 2022-12-01 DIAGNOSIS — I73.9 PERIPHERAL VASCULAR DISEASE, UNSPECIFIED (H): ICD-10-CM

## 2022-12-01 PROCEDURE — 99207 PR NO BILLABLE SERVICE THIS VISIT: CPT | Performed by: INTERNAL MEDICINE

## 2022-12-01 RX ORDER — CLOPIDOGREL BISULFATE 75 MG/1
TABLET ORAL
Qty: 90 TABLET | Refills: 1 | Status: SHIPPED | OUTPATIENT
Start: 2022-12-01 | End: 2023-04-19

## 2022-12-01 NOTE — TELEPHONE ENCOUNTER
clopidogrel (PLAVIX) 75 MG tablet  Last Written Prescription Date:  5/16/22  Last Fill Quantity: 90,   # refills: 1  Last Office Visit : 9/12/22  Future Office visit:  1/9/23      Routing refill request to provider for review/approval because:  abnormal hgb. ordered by other provider.

## 2022-12-03 DIAGNOSIS — I25.10 CORONARY ARTERY DISEASE DUE TO LIPID RICH PLAQUE: ICD-10-CM

## 2022-12-03 DIAGNOSIS — E11.40 TYPE 2 DIABETES, CONTROLLED, WITH NEUROPATHY (H): ICD-10-CM

## 2022-12-03 DIAGNOSIS — I25.83 CORONARY ARTERY DISEASE DUE TO LIPID RICH PLAQUE: ICD-10-CM

## 2022-12-03 DIAGNOSIS — I73.9 PVD (PERIPHERAL VASCULAR DISEASE) (H): ICD-10-CM

## 2022-12-05 RX ORDER — SIMVASTATIN 40 MG
40 TABLET ORAL AT BEDTIME
Qty: 90 TABLET | Refills: 2 | Status: SHIPPED | OUTPATIENT
Start: 2022-12-05 | End: 2023-11-09

## 2022-12-05 NOTE — TELEPHONE ENCOUNTER
simvastatin (ZOCOR) 40 MG tablet  Passed Protocol    Last office visit:    9/12/2022  [facility] Internal Medicine Racheal Pisano MD  Internal Medicine

## 2022-12-06 ENCOUNTER — OFFICE VISIT (OUTPATIENT)
Dept: PODIATRY | Facility: CLINIC | Age: 77
End: 2022-12-06
Payer: COMMERCIAL

## 2022-12-06 DIAGNOSIS — L84 TYLOMA: ICD-10-CM

## 2022-12-06 DIAGNOSIS — E11.49 TYPE II OR UNSPECIFIED TYPE DIABETES MELLITUS WITH NEUROLOGICAL MANIFESTATIONS, NOT STATED AS UNCONTROLLED(250.60) (H): ICD-10-CM

## 2022-12-06 DIAGNOSIS — L97.322 SKIN ULCER OF LEFT ANKLE WITH FAT LAYER EXPOSED (H): ICD-10-CM

## 2022-12-06 DIAGNOSIS — E11.51 DIABETES MELLITUS WITH PERIPHERAL VASCULAR DISEASE (H): Primary | ICD-10-CM

## 2022-12-06 DIAGNOSIS — E11.610 CHARCOT FOOT DUE TO DIABETES MELLITUS (H): ICD-10-CM

## 2022-12-06 DIAGNOSIS — I87.8 VENOUS STASIS: ICD-10-CM

## 2022-12-06 DIAGNOSIS — L08.9 INFECTED ABRASION OF LEFT LOWER EXTREMITY, SEQUELA: ICD-10-CM

## 2022-12-06 DIAGNOSIS — S80.812S INFECTED ABRASION OF LEFT LOWER EXTREMITY, SEQUELA: ICD-10-CM

## 2022-12-06 PROCEDURE — 99215 OFFICE O/P EST HI 40 MIN: CPT | Performed by: PODIATRIST

## 2022-12-06 RX ORDER — GENTAMICIN SULFATE 1 MG/G
CREAM TOPICAL DAILY
Qty: 60 G | Refills: 1 | Status: SHIPPED | OUTPATIENT
Start: 2022-12-06 | End: 2024-06-12

## 2022-12-06 NOTE — NURSING NOTE
Amos Walker's chief complaint for this visit includes:  Chief Complaint   Patient presents with     Follow Up     Follow up bilat ankle ulcers     PCP: Racheal Swift    Referring Provider:  No referring provider defined for this encounter.    There were no vitals taken for this visit.  Data Unavailable        Allergies   Allergen Reactions     Seasonal Allergies      Lisinopril Dizziness     Methylchloroisothiazolinone [Methylisothiazolinone] Rash     Neomycin      Wound gets worse     Povidone Iodine Rash         Do you need any medication refills at today's visit?
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
## 2022-12-08 DIAGNOSIS — E11.40 TYPE 2 DIABETES, CONTROLLED, WITH NEUROPATHY (H): ICD-10-CM

## 2022-12-08 RX ORDER — PEN NEEDLE, DIABETIC 31 GX5/16"
NEEDLE, DISPOSABLE MISCELLANEOUS
Qty: 400 EACH | Refills: 2 | Status: SHIPPED | OUTPATIENT
Start: 2022-12-08 | End: 2024-04-04

## 2022-12-08 NOTE — TELEPHONE ENCOUNTER
insulin pen needle (B-D U/F) 31G X 8 MM miscellaneous      Last Written Prescription Date:  9/29/21  Last Fill Quantity: 400,   # refills: 3  Last Office Visit : 9/12/22  Future Office visit:  1/9/23    Passed Protocol

## 2022-12-13 ENCOUNTER — OFFICE VISIT (OUTPATIENT)
Dept: PODIATRY | Facility: CLINIC | Age: 77
End: 2022-12-13
Payer: COMMERCIAL

## 2022-12-13 DIAGNOSIS — I87.8 VENOUS STASIS: ICD-10-CM

## 2022-12-13 DIAGNOSIS — E11.51 DIABETES MELLITUS WITH PERIPHERAL VASCULAR DISEASE (H): Primary | ICD-10-CM

## 2022-12-13 DIAGNOSIS — E11.610 CHARCOT FOOT DUE TO DIABETES MELLITUS (H): ICD-10-CM

## 2022-12-13 DIAGNOSIS — L08.9 INFECTED ABRASION OF LEFT LOWER EXTREMITY, SEQUELA: ICD-10-CM

## 2022-12-13 DIAGNOSIS — L97.322 SKIN ULCER OF LEFT ANKLE WITH FAT LAYER EXPOSED (H): ICD-10-CM

## 2022-12-13 DIAGNOSIS — S80.812S INFECTED ABRASION OF LEFT LOWER EXTREMITY, SEQUELA: ICD-10-CM

## 2022-12-13 DIAGNOSIS — E11.49 TYPE II OR UNSPECIFIED TYPE DIABETES MELLITUS WITH NEUROLOGICAL MANIFESTATIONS, NOT STATED AS UNCONTROLLED(250.60) (H): ICD-10-CM

## 2022-12-13 PROCEDURE — 99214 OFFICE O/P EST MOD 30 MIN: CPT | Performed by: PODIATRIST
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
## 2022-12-13 NOTE — NURSING NOTE
Amos Walker's chief complaint for this visit includes:  Chief Complaint   Patient presents with     Follow Up     Bilat ankle ulcers     PCP: Racheal Swift    Referring Provider:  No referring provider defined for this encounter.    There were no vitals taken for this visit.  Data Unavailable        Allergies   Allergen Reactions     Seasonal Allergies      Lisinopril Dizziness     Methylchloroisothiazolinone [Methylisothiazolinone] Rash     Neomycin      Wound gets worse     Povidone Iodine Rash         Do you need any medication refills at today's visit?

## 2022-12-19 ENCOUNTER — OFFICE VISIT (OUTPATIENT)
Dept: PODIATRY | Facility: CLINIC | Age: 77
End: 2022-12-19
Payer: COMMERCIAL

## 2022-12-19 DIAGNOSIS — L97.922 SKIN ULCER OF LEFT LOWER LEG WITH FAT LAYER EXPOSED (H): ICD-10-CM

## 2022-12-19 DIAGNOSIS — E11.49 TYPE II OR UNSPECIFIED TYPE DIABETES MELLITUS WITH NEUROLOGICAL MANIFESTATIONS, NOT STATED AS UNCONTROLLED(250.60) (H): ICD-10-CM

## 2022-12-19 DIAGNOSIS — L84 TYLOMA: ICD-10-CM

## 2022-12-19 DIAGNOSIS — L08.9 INFECTED ABRASION OF LEFT LOWER EXTREMITY, SEQUELA: ICD-10-CM

## 2022-12-19 DIAGNOSIS — I87.8 VENOUS STASIS: ICD-10-CM

## 2022-12-19 DIAGNOSIS — L97.322 SKIN ULCER OF LEFT ANKLE WITH FAT LAYER EXPOSED (H): ICD-10-CM

## 2022-12-19 DIAGNOSIS — E11.51 DIABETES MELLITUS WITH PERIPHERAL VASCULAR DISEASE (H): Primary | ICD-10-CM

## 2022-12-19 DIAGNOSIS — S80.812S INFECTED ABRASION OF LEFT LOWER EXTREMITY, SEQUELA: ICD-10-CM

## 2022-12-19 DIAGNOSIS — E11.610 CHARCOT FOOT DUE TO DIABETES MELLITUS (H): ICD-10-CM

## 2022-12-19 PROCEDURE — 99214 OFFICE O/P EST MOD 30 MIN: CPT | Performed by: PODIATRIST
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
## 2023-01-02 ENCOUNTER — TELEPHONE (OUTPATIENT)
Dept: PODIATRY | Facility: CLINIC | Age: 78
End: 2023-01-02

## 2023-01-04 ENCOUNTER — OFFICE VISIT (OUTPATIENT)
Dept: PODIATRY | Facility: CLINIC | Age: 78
End: 2023-01-04
Payer: COMMERCIAL

## 2023-01-04 DIAGNOSIS — E11.49 TYPE II OR UNSPECIFIED TYPE DIABETES MELLITUS WITH NEUROLOGICAL MANIFESTATIONS, NOT STATED AS UNCONTROLLED(250.60) (H): ICD-10-CM

## 2023-01-04 DIAGNOSIS — L08.9 INFECTED ABRASION OF LEFT LOWER EXTREMITY, SEQUELA: ICD-10-CM

## 2023-01-04 DIAGNOSIS — E11.610 CHARCOT FOOT DUE TO DIABETES MELLITUS (H): ICD-10-CM

## 2023-01-04 DIAGNOSIS — S80.812S INFECTED ABRASION OF LEFT LOWER EXTREMITY, SEQUELA: ICD-10-CM

## 2023-01-04 DIAGNOSIS — L97.322 SKIN ULCER OF LEFT ANKLE WITH FAT LAYER EXPOSED (H): ICD-10-CM

## 2023-01-04 DIAGNOSIS — L97.521 SKIN ULCER OF LEFT FOOT, LIMITED TO BREAKDOWN OF SKIN (H): ICD-10-CM

## 2023-01-04 DIAGNOSIS — E11.51 DIABETES MELLITUS WITH PERIPHERAL VASCULAR DISEASE (H): Primary | ICD-10-CM

## 2023-01-04 DIAGNOSIS — L84 TYLOMA: ICD-10-CM

## 2023-01-04 PROCEDURE — 99214 OFFICE O/P EST MOD 30 MIN: CPT | Performed by: PODIATRIST
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
## 2023-01-19 ENCOUNTER — OFFICE VISIT (OUTPATIENT)
Dept: VASCULAR SURGERY | Facility: CLINIC | Age: 78
End: 2023-01-19
Payer: COMMERCIAL

## 2023-01-19 ENCOUNTER — ANCILLARY PROCEDURE (OUTPATIENT)
Dept: ULTRASOUND IMAGING | Facility: CLINIC | Age: 78
End: 2023-01-19
Attending: SURGERY
Payer: COMMERCIAL

## 2023-01-19 VITALS — OXYGEN SATURATION: 98 % | HEART RATE: 83 BPM | SYSTOLIC BLOOD PRESSURE: 132 MMHG | DIASTOLIC BLOOD PRESSURE: 70 MMHG

## 2023-01-19 DIAGNOSIS — I70.243 ATHEROSCLEROSIS OF NATIVE ARTERY OF LEFT LOWER EXTREMITY WITH ULCERATION OF ANKLE (H): Primary | ICD-10-CM

## 2023-01-19 DIAGNOSIS — I73.9 PAD (PERIPHERAL ARTERY DISEASE) (H): ICD-10-CM

## 2023-01-19 PROCEDURE — 93922 UPR/L XTREMITY ART 2 LEVELS: CPT | Performed by: RADIOLOGY

## 2023-01-19 PROCEDURE — 99212 OFFICE O/P EST SF 10 MIN: CPT | Mod: GC | Performed by: SURGERY

## 2023-01-19 ASSESSMENT — PAIN SCALES - GENERAL: PAINLEVEL: NO PAIN (0)

## 2023-01-19 NOTE — PROGRESS NOTES
Please contact patient to see where exactly he had his eye exam VASCULAR SURGERY CLINIC NOTE    Mr. Walker is a 76yo man with bilateral lower extremity wounds for which he follows with Podiatry. He is s/p left SFA and popliteal angioplasty 1/21 and right femoral endarterectomy with SFA stent and popliteal angioplasty 9/18.    He reports that his right foot and leg wounds have healed. He still has a small ulcer on his left medial malleolus and left second toe. They are healing slowly. He reports being more active over the past year. He fell and scraped his shin; he is taking antibiotics for that wound and it too is healing.    /70 (BP Location: Right arm, Patient Position: Chair, Cuff Size: Adult Regular)   Pulse 83   SpO2 98%     On exam, he appears stated age. Pictures in chart reviewed.            ABIs reviewed and stable from two years ago.    Mr. Walker is a 76yo man with bilateral lower extremity wounds s/p revascularization, healing slowly    - continue current wound care  - follow up in six months with repeat ABIs    Ann Méndez MD  01/19/23  3:53 PM

## 2023-01-20 ENCOUNTER — OFFICE VISIT (OUTPATIENT)
Dept: PODIATRY | Facility: CLINIC | Age: 78
End: 2023-01-20
Payer: COMMERCIAL

## 2023-01-20 DIAGNOSIS — L97.322 SKIN ULCER OF LEFT ANKLE WITH FAT LAYER EXPOSED (H): ICD-10-CM

## 2023-01-20 DIAGNOSIS — E11.51 DIABETES MELLITUS WITH PERIPHERAL VASCULAR DISEASE (H): Primary | ICD-10-CM

## 2023-01-20 DIAGNOSIS — L08.9 INFECTED ABRASION OF LEFT LOWER EXTREMITY, SEQUELA: ICD-10-CM

## 2023-01-20 DIAGNOSIS — S80.812S INFECTED ABRASION OF LEFT LOWER EXTREMITY, SEQUELA: ICD-10-CM

## 2023-01-20 DIAGNOSIS — I87.8 VENOUS STASIS: ICD-10-CM

## 2023-01-20 DIAGNOSIS — L97.521 SKIN ULCER OF LEFT FOOT, LIMITED TO BREAKDOWN OF SKIN (H): ICD-10-CM

## 2023-01-20 DIAGNOSIS — E11.49 TYPE II OR UNSPECIFIED TYPE DIABETES MELLITUS WITH NEUROLOGICAL MANIFESTATIONS, NOT STATED AS UNCONTROLLED(250.60) (H): ICD-10-CM

## 2023-01-20 DIAGNOSIS — E11.610 CHARCOT FOOT DUE TO DIABETES MELLITUS (H): ICD-10-CM

## 2023-01-20 DIAGNOSIS — L84 TYLOMA: ICD-10-CM

## 2023-01-20 PROCEDURE — 99215 OFFICE O/P EST HI 40 MIN: CPT | Performed by: PODIATRIST
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
## 2023-01-25 ENCOUNTER — OFFICE VISIT (OUTPATIENT)
Dept: AUDIOLOGY | Facility: CLINIC | Age: 78
End: 2023-01-25
Payer: COMMERCIAL

## 2023-01-25 ENCOUNTER — OFFICE VISIT (OUTPATIENT)
Dept: OTOLARYNGOLOGY | Facility: CLINIC | Age: 78
End: 2023-01-25
Payer: COMMERCIAL

## 2023-01-25 VITALS
BODY MASS INDEX: 21.94 KG/M2 | DIASTOLIC BLOOD PRESSURE: 78 MMHG | SYSTOLIC BLOOD PRESSURE: 180 MMHG | HEIGHT: 74 IN | HEART RATE: 97 BPM | OXYGEN SATURATION: 98 % | RESPIRATION RATE: 16 BRPM | WEIGHT: 171 LBS

## 2023-01-25 DIAGNOSIS — H60.393 INFECTIVE OTITIS EXTERNA, BILATERAL: ICD-10-CM

## 2023-01-25 DIAGNOSIS — H61.23 BILATERAL IMPACTED CERUMEN: ICD-10-CM

## 2023-01-25 DIAGNOSIS — H90.3 SENSORY HEARING LOSS, BILATERAL: Primary | ICD-10-CM

## 2023-01-25 DIAGNOSIS — H92.13 OTORRHEA, BILATERAL: Primary | ICD-10-CM

## 2023-01-25 PROCEDURE — 99213 OFFICE O/P EST LOW 20 MIN: CPT | Mod: 25 | Performed by: OTOLARYNGOLOGY

## 2023-01-25 PROCEDURE — 69210 REMOVE IMPACTED EAR WAX UNI: CPT | Performed by: OTOLARYNGOLOGY

## 2023-01-25 PROCEDURE — 92557 COMPREHENSIVE HEARING TEST: CPT

## 2023-01-25 PROCEDURE — 92591 PR HEARING AID EXAM BINAURAL: CPT

## 2023-01-25 PROCEDURE — 92550 TYMPANOMETRY & REFLEX THRESH: CPT

## 2023-01-25 RX ORDER — CIPROFLOXACIN AND DEXAMETHASONE 3; 1 MG/ML; MG/ML
4 SUSPENSION/ DROPS AURICULAR (OTIC) 2 TIMES DAILY
Qty: 7.5 ML | Refills: 0 | Status: SHIPPED | OUTPATIENT
Start: 2023-01-25 | End: 2023-02-01

## 2023-01-25 ASSESSMENT — PAIN SCALES - GENERAL: PAINLEVEL: NO PAIN (0)

## 2023-01-25 NOTE — LETTER
1/25/2023         RE: Amos Walker  [address] [facility] [address]        Dear Colleague,    Thank you for referring your patient, Amos Walker, to the [facility]. Please see a copy of my visit note below.    Chief Complaint - ear drainage and hearing loss    History of Present Illness - Amos Walker is a 77 year old male who presents to me today with hearing loss and drainage both ears, left is worse. It is itchy. He wears aids. No pain.  It has been present and noticeable for approximately 2 months. The patient has noted some otorrhea. The patient has tried nothing.     Tests personally reviewed today for this visit:   1.) audiogram today shows severe to profound sensorineural hearing loss, left worse than right.   2.) type A bilaterally    Past Medical History -   Patient Active Problem List   Diagnosis     Senile nuclear sclerosis     PVD (peripheral vascular disease) (H)     HTN (hypertension)     CKD (chronic kidney disease) stage 3, GFR 30-59 ml/min (H)     Type 2 diabetes, controlled, with neuropathy (H)     Diabetes mellitus with peripheral vascular disease (H)     Fracture of neck of femur (H)     Aftercare following joint replacement [Z47.1]     Long-term (current) use of anticoagulants [Z79.01]     Status post left heart catheterization     Status post coronary angiogram     Critical lower limb ischemia (H)     Non-healing ulcer (H)     Atherosclerosis of native artery of left lower extremity with ulceration of ankle (H)     Atherosclerosis of native arteries of right leg with ulceration of other part of foot (H)     Type II or unspecified type diabetes mellitus with neurological manifestations, not stated as uncontrolled(250.60) (H)     Charcot foot due to diabetes mellitus (H)     Venous stasis     Ulcer of right lower extremity, limited to breakdown of skin (H)     Colitis presumed infectious     Hypotension, unspecified hypotension type     Bright red blood  per rectum     Adjustment disorder with depressed mood     Centrilobular emphysema (H)     PAD (peripheral artery disease) (H)     Closed fracture of left olecranon process     Venous stasis ulcer of ankle, unspecified laterality, unspecified ulcer stage, unspecified whether varicose veins present (H)       Current Medications -   Current Outpatient Medications:      acetaminophen (TYLENOL) 325 MG tablet, Take 2 tablets (650 mg) by mouth every 4 hours as needed for other (multimodal surgical pain management along with NSAIDS and opioid medication as indicated based on pain control and physical function.), Disp: , Rfl:      alendronate (FOSAMAX) 70 MG tablet, Take 1 tablet (70 mg) by mouth every 7 days Take on empty stomach for 30 min with full glass of water, dont lay down, Disp: 12 tablet, Rfl: 3     ammonium lactate (LAC-HYDRIN) 12 % external cream, Apply topically 2 times daily as needed for dry skin, Disp: 385 g, Rfl: 3     amoxicillin-clavulanate (AUGMENTIN) 875-125 MG tablet, Take 1 tablet by mouth 2 times daily, Disp: 28 tablet, Rfl: 0     ascorbic acid 500 MG TABS, Take 1 tablet (500 mg) by mouth daily, Disp: 100 tablet, Rfl: 3     aspirin 81 MG EC tablet, Take 81 mg by mouth daily, Disp: , Rfl:      calcium carbonate (OS-CLAUDIA) 500 MG tablet, Take 1 tablet by mouth 2 times daily, Disp: , Rfl:      cephALEXin (KEFLEX) 500 MG capsule, Take 1 capsule (500 mg) by mouth 2 times daily, Disp: 28 capsule, Rfl: 0     cephALEXin (KEFLEX) 500 MG capsule, Take 1 capsule (500 mg) by mouth 2 times daily, Disp: 28 capsule, Rfl: 0     cetirizine (ZYRTEC) 10 MG tablet, Take 1 tablet (10 mg) by mouth daily, Disp: 90 tablet, Rfl: 1     clindamycin (CLEOCIN) 300 MG capsule, Take 1 capsule (300 mg) by mouth 2 times daily, Disp: 60 capsule, Rfl: 0     clindamycin (CLEOCIN) 300 MG capsule, Take 1 capsule (300 mg) by mouth 2 times daily, Disp: 60 capsule, Rfl: 0     clindamycin (CLEOCIN) 300 MG capsule, Take 1 capsule (300 mg) by  mouth 4 times daily, Disp: 20 capsule, Rfl: 0     clopidogrel (PLAVIX) 75 MG tablet, TAKE 1 TABLET(75 MG) BY MOUTH DAILY, Disp: 90 tablet, Rfl: 1     Continuous Blood Gluc  (FREESTYLE LATOYA 14 DAY READER) TANIA, 1 Device every hour as needed (every hour prn), Disp: 1 Device, Rfl: 0     continuous blood glucose monitoring (FREESTYLE LATOYA) sensor, For use with Freestyle Latoya Flash  for continuous monitioring of blood glucose levels. Replace sensor every 14 days., Disp: 6 each, Rfl: 3     ferrous sulfate (FEROSUL) 325 (65 Fe) MG tablet, Take 1 tablet (325 mg) by mouth daily (with breakfast), Disp: 100 tablet, Rfl: 3     gentamicin (GARAMYCIN) 0.1 % external cream, Apply topically daily To left ulcers., Disp: 60 g, Rfl: 1     insulin lispro (HUMALOG KWIKPEN) 100 UNIT/ML (1 unit dial) KWIKPEN, INJECT 4 UNITS UNDER THE SKIN WITH BREAKFAST, AND 3 UNITS WITH LUNCH AND 4 UNITS WITH DINNER, Disp: 15 mL, Rfl: 1     insulin pen needle (B-D U/F) 31G X 8 MM miscellaneous, USE AS DIRECTED TO TEST FOUR TIMES DAILY, Disp: 400 each, Rfl: 2     ketoconazole (NIZORAL) 2 % external shampoo, Apply topically twice a week Leave on for 3-5 min then rinse off; after 2-4 weeks use only once weekly, Disp: 120 mL, Rfl: 3     lisinopril (ZESTRIL) 2.5 MG tablet, Take 2-3 tablets (5-7.5 mg) by mouth daily, Disp: 270 tablet, Rfl: 3     silver sulfADIAZINE (SSD) 1 % external cream, Apply topically to the affected area on right foot and leg as directed., Disp: 85 g, Rfl: 11     simvastatin (ZOCOR) 40 MG tablet, Take 1 tablet (40 mg) by mouth At Bedtime, Disp: 90 tablet, Rfl: 2     triamcinolone (KENALOG) 0.025 % external ointment, Apply twice daily to skin around eyes and mouth for 2 weeks, then use twice daily for 2 days per week only., Disp: 15 g, Rfl: 1     triamcinolone (KENALOG) 0.1 % external cream, Apply topically daily To affected areas on feet and legs., Disp: 45 g, Rfl: 1     triamcinolone (KENALOG) 0.1 % external cream,  "Apply to arms twice daily for 14 days, then use twice daily 2 times per week only, Disp: 80 g, Rfl: 1     triamcinolone (KENALOG) 0.1 % external ointment, Apply topically 2 times daily, Disp: 30 g, Rfl: 0     TRULICITY 1.5 MG/0.5ML pen, ADMINISTER 1.5 MG UNDER THE SKIN EVERY 7 DAYS, Disp: 6 mL, Rfl: 1     VITAMIN D, CHOLECALCIFEROL, PO, Take 1,000 Units by mouth 2 times daily, Disp: , Rfl:      blood glucose monitoring (FREESTYLE) lancets, Use to test blood sugars 4 times daily as directed., Disp: 100 each, Rfl: 11     ONETOUCH ULTRA test strip, TEST TWICE DAILY AS DIRECTED, Disp: 200 strip, Rfl: 3    Allergies -   Allergies   Allergen Reactions     Seasonal Allergies      Lisinopril Dizziness     Methylchloroisothiazolinone [Methylisothiazolinone] Rash     Neomycin      Wound gets worse     Povidone Iodine Rash       Social History -   Social History     Socioeconomic History     Marital status:      Spouse name: None     Number of children: None     Years of education: None     Highest education level: None   Tobacco Use     Smoking status: Every Day     Packs/day: 0.25     Years: 50.00     Pack years: 12.50     Types: Cigarettes     Smokeless tobacco: Never   Substance and Sexual Activity     Alcohol use: No     Drug use: No   Social History Narrative    3 sons, [place], [facility]       Family History -   Family History   Problem Relation Age of Onset     Cancer Father         colon     Kidney Disease Father      Kidney Disease Mother      Cardiovascular Son         MI in 40s     Macular Degeneration Brother      Glaucoma No family hx of      Melanoma No family hx of      Skin Cancer No family hx of        Review of Systems - As per HPI and PMHx, otherwise 7 system review of the head and neck negative.    Physical Exam  BP (!) 180/78   Pulse 97   Resp 16   Ht 1.892 m (6' 2.49\")   Wt 77.6 kg (171 lb)   SpO2 98%   BMI 21.67 kg/m    General - The patient is in no distress.  Alert and " oriented to person and place, answers questions and cooperates with examination appropriately.   Voice and Breathing - The patient was breathing comfortably without the use of accessory muscles. There was no wheezing, stridor, or stertor.  The patients voice was clear and strong.  Ears - The left ear was examined. It showed some erythema and edema of the canal. There was moist debri impacting the canal and I couldn't see the tympanic membrane. See below for cerumen and debri removal procedure. The right ear was then examined. He had debri on the tympanic membrane impacting the canal. See below. The canal was nonedematous some erythema. No evidence of infection. The tympanic membrane was intact and the middle ear was well aerated.     PROCEDURE - bialteral ear cerumen and debri removal    The left ear was impacted with cerumen and infectious debri. I laid the patient supine in the exam chair. Using the binocular microscope I used a #5 suction and suctioned and debrided the cerumen and debri in the left canal. This was somewhat tender for the patient. I could then see the tympanic membrane. It appeared intact. Tympanic membrane was thickened. The right medial canal also had impacted cerumen. I suctioned this. It was in the medial canal and anterior fornix. I cleaned all the was.     Assessment and Plan - Amos Walker is a 77 year old male who has a left ear otitis externa. I debrided the canal under the microscope. I will prescribe ciprodex and have the patient return in 1-2 weeks. I discussed keeping the ear dry, and to not place anything in the ear except for the ear drops.     Dominic Archuleta MD  Otolaryngology  [facility]        Again, thank you for allowing me to participate in the care of your patient.        Sincerely,        Dominic Archuleta MD

## 2023-01-25 NOTE — PROGRESS NOTES
Chief Complaint - ear drainage and hearing loss    History of Present Illness - Amos Walker is a 77 year old male who presents to me today with hearing loss and drainage both ears, left is worse. It is itchy. He wears aids. No pain.  It has been present and noticeable for approximately 2 months. The patient has noted some otorrhea. The patient has tried nothing.     Tests personally reviewed today for this visit:   1.) audiogram today shows severe to profound sensorineural hearing loss, left worse than right.   2.) type A bilaterally    Past Medical History -   Patient Active Problem List   Diagnosis     Senile nuclear sclerosis     PVD (peripheral vascular disease) (H)     HTN (hypertension)     CKD (chronic kidney disease) stage 3, GFR 30-59 ml/min (H)     Type 2 diabetes, controlled, with neuropathy (H)     Diabetes mellitus with peripheral vascular disease (H)     Fracture of neck of femur (H)     Aftercare following joint replacement [Z47.1]     Long-term (current) use of anticoagulants [Z79.01]     Status post left heart catheterization     Status post coronary angiogram     Critical lower limb ischemia (H)     Non-healing ulcer (H)     Atherosclerosis of native artery of left lower extremity with ulceration of ankle (H)     Atherosclerosis of native arteries of right leg with ulceration of other part of foot (H)     Type II or unspecified type diabetes mellitus with neurological manifestations, not stated as uncontrolled(250.60) (H)     Charcot foot due to diabetes mellitus (H)     Venous stasis     Ulcer of right lower extremity, limited to breakdown of skin (H)     Colitis presumed infectious     Hypotension, unspecified hypotension type     Bright red blood per rectum     Adjustment disorder with depressed mood     Centrilobular emphysema (H)     PAD (peripheral artery disease) (H)     Closed fracture of left olecranon process     Venous stasis ulcer of ankle, unspecified laterality, unspecified ulcer  stage, unspecified whether varicose veins present (H)       Current Medications -   Current Outpatient Medications:      acetaminophen (TYLENOL) 325 MG tablet, Take 2 tablets (650 mg) by mouth every 4 hours as needed for other (multimodal surgical pain management along with NSAIDS and opioid medication as indicated based on pain control and physical function.), Disp: , Rfl:      alendronate (FOSAMAX) 70 MG tablet, Take 1 tablet (70 mg) by mouth every 7 days Take on empty stomach for 30 min with full glass of water, dont lay down, Disp: 12 tablet, Rfl: 3     ammonium lactate (LAC-HYDRIN) 12 % external cream, Apply topically 2 times daily as needed for dry skin, Disp: 385 g, Rfl: 3     amoxicillin-clavulanate (AUGMENTIN) 875-125 MG tablet, Take 1 tablet by mouth 2 times daily, Disp: 28 tablet, Rfl: 0     ascorbic acid 500 MG TABS, Take 1 tablet (500 mg) by mouth daily, Disp: 100 tablet, Rfl: 3     aspirin 81 MG EC tablet, Take 81 mg by mouth daily, Disp: , Rfl:      calcium carbonate (OS-CLAUDIA) 500 MG tablet, Take 1 tablet by mouth 2 times daily, Disp: , Rfl:      cephALEXin (KEFLEX) 500 MG capsule, Take 1 capsule (500 mg) by mouth 2 times daily, Disp: 28 capsule, Rfl: 0     cephALEXin (KEFLEX) 500 MG capsule, Take 1 capsule (500 mg) by mouth 2 times daily, Disp: 28 capsule, Rfl: 0     cetirizine (ZYRTEC) 10 MG tablet, Take 1 tablet (10 mg) by mouth daily, Disp: 90 tablet, Rfl: 1     clindamycin (CLEOCIN) 300 MG capsule, Take 1 capsule (300 mg) by mouth 2 times daily, Disp: 60 capsule, Rfl: 0     clindamycin (CLEOCIN) 300 MG capsule, Take 1 capsule (300 mg) by mouth 2 times daily, Disp: 60 capsule, Rfl: 0     clindamycin (CLEOCIN) 300 MG capsule, Take 1 capsule (300 mg) by mouth 4 times daily, Disp: 20 capsule, Rfl: 0     clopidogrel (PLAVIX) 75 MG tablet, TAKE 1 TABLET(75 MG) BY MOUTH DAILY, Disp: 90 tablet, Rfl: 1     Continuous Blood Gluc  (FREESTYLE LATOYA 14 DAY READER) TANIA, 1 Device every hour as needed  (every hour prn), Disp: 1 Device, Rfl: 0     continuous blood glucose monitoring (FREESTYLE LATOYA) sensor, For use with Freestyle Latoya Flash  for continuous monitioring of blood glucose levels. Replace sensor every 14 days., Disp: 6 each, Rfl: 3     ferrous sulfate (FEROSUL) 325 (65 Fe) MG tablet, Take 1 tablet (325 mg) by mouth daily (with breakfast), Disp: 100 tablet, Rfl: 3     gentamicin (GARAMYCIN) 0.1 % external cream, Apply topically daily To left ulcers., Disp: 60 g, Rfl: 1     insulin lispro (HUMALOG KWIKPEN) 100 UNIT/ML (1 unit dial) KWIKPEN, INJECT 4 UNITS UNDER THE SKIN WITH BREAKFAST, AND 3 UNITS WITH LUNCH AND 4 UNITS WITH DINNER, Disp: 15 mL, Rfl: 1     insulin pen needle (B-D U/F) 31G X 8 MM miscellaneous, USE AS DIRECTED TO TEST FOUR TIMES DAILY, Disp: 400 each, Rfl: 2     ketoconazole (NIZORAL) 2 % external shampoo, Apply topically twice a week Leave on for 3-5 min then rinse off; after 2-4 weeks use only once weekly, Disp: 120 mL, Rfl: 3     lisinopril (ZESTRIL) 2.5 MG tablet, Take 2-3 tablets (5-7.5 mg) by mouth daily, Disp: 270 tablet, Rfl: 3     silver sulfADIAZINE (SSD) 1 % external cream, Apply topically to the affected area on right foot and leg as directed., Disp: 85 g, Rfl: 11     simvastatin (ZOCOR) 40 MG tablet, Take 1 tablet (40 mg) by mouth At Bedtime, Disp: 90 tablet, Rfl: 2     triamcinolone (KENALOG) 0.025 % external ointment, Apply twice daily to skin around eyes and mouth for 2 weeks, then use twice daily for 2 days per week only., Disp: 15 g, Rfl: 1     triamcinolone (KENALOG) 0.1 % external cream, Apply topically daily To affected areas on feet and legs., Disp: 45 g, Rfl: 1     triamcinolone (KENALOG) 0.1 % external cream, Apply to arms twice daily for 14 days, then use twice daily 2 times per week only, Disp: 80 g, Rfl: 1     triamcinolone (KENALOG) 0.1 % external ointment, Apply topically 2 times daily, Disp: 30 g, Rfl: 0     TRULICITY 1.5 MG/0.5ML pen, ADMINISTER 1.5  "MG UNDER THE SKIN EVERY 7 DAYS, Disp: 6 mL, Rfl: 1     VITAMIN D, CHOLECALCIFEROL, PO, Take 1,000 Units by mouth 2 times daily, Disp: , Rfl:      blood glucose monitoring (FREESTYLE) lancets, Use to test blood sugars 4 times daily as directed., Disp: 100 each, Rfl: 11     ONETOUCH ULTRA test strip, TEST TWICE DAILY AS DIRECTED, Disp: 200 strip, Rfl: 3    Allergies -   Allergies   Allergen Reactions     Seasonal Allergies      Lisinopril Dizziness     Methylchloroisothiazolinone [Methylisothiazolinone] Rash     Neomycin      Wound gets worse     Povidone Iodine Rash       Social History -   Social History     Socioeconomic History     Marital status:      Spouse name: None     Number of children: None     Years of education: None     Highest education level: None   Tobacco Use     Smoking status: Every Day     Packs/day: 0.25     Years: 50.00     Pack years: 12.50     Types: Cigarettes     Smokeless tobacco: Never   Substance and Sexual Activity     Alcohol use: No     Drug use: No   Social History Narrative    3 sons, [facility]       Family History -   Family History   Problem Relation Age of Onset     Cancer Father         colon     Kidney Disease Father      Kidney Disease Mother      Cardiovascular Son         MI in 40s     Macular Degeneration Brother      Glaucoma No family hx of      Melanoma No family hx of      Skin Cancer No family hx of        Review of Systems - As per HPI and PMHx, otherwise 7 system review of the head and neck negative.    Physical Exam  BP (!) 180/78   Pulse 97   Resp 16   Ht 1.892 m (6' 2.49\")   Wt 77.6 kg (171 lb)   SpO2 98%   BMI 21.67 kg/m    General - The patient is in no distress.  Alert and oriented to person and place, answers questions and cooperates with examination appropriately.   Voice and Breathing - The patient was breathing comfortably without the use of accessory muscles. There was no wheezing, stridor, or stertor.  The patients " voice was clear and strong.  Ears - The left ear was examined. It showed some erythema and edema of the canal. There was moist debri impacting the canal and I couldn't see the tympanic membrane. See below for cerumen and debri removal procedure. The right ear was then examined. He had debri on the tympanic membrane impacting the canal. See below. The canal was nonedematous some erythema. No evidence of infection. The tympanic membrane was intact and the middle ear was well aerated.     PROCEDURE - bialteral ear cerumen and debri removal    The left ear was impacted with cerumen and infectious debri. I laid the patient supine in the exam chair. Using the binocular microscope I used a #5 suction and suctioned and debrided the cerumen and debri in the left canal. This was somewhat tender for the patient. I could then see the tympanic membrane. It appeared intact. Tympanic membrane was thickened. The right medial canal also had impacted cerumen. I suctioned this. It was in the medial canal and anterior fornix. I cleaned all the was.     Assessment and Plan - Amos Walker is a 77 year old male who has a left ear otitis externa. I debrided the canal under the microscope. I will prescribe ciprodex and have the patient return in 1-2 weeks. I discussed keeping the ear dry, and to not place anything in the ear except for the ear drops.     Dominic Archuleta MD  Otolaryngology  [facility]

## 2023-01-25 NOTE — PROGRESS NOTES
AUDIOLOGY REPORT    SUBJECTIVE:  Amos Walker is a 77 year old male who was seen in the Audiology Clinic at the [facility] for audiologic evaluation, referred by self. The patient has been seen previously in this clinic on 9/14/2021 for assessment and results indicated a bilateral severe sensorineural hearing loss. The patient reports a possible decline in hearing and is interested in updated amplification. The patient denies bilateral otalgia, bilateral drainage, changes to his longstanding tinnitus and bilateral aural fullness. He does report recent 'liquid wax' itchiness in both ears. The patient notes difficulty with communication in a variety of listening situations. He was unaccompanied to today's appointment.     OBJECTIVE:    Otoscopic exam indicates ears are clear of cerumen bilaterally, however, both ears were very red in color. There appeared to be a white substance deep within the canal.      Pure Tone Thresholds assessed using conventional audiometry with good  reliability from 250-8000 Hz bilaterally using insert earphones and circumaural headphones     RIGHT:  severe sloping to profound sensorineural hearing loss    LEFT:    severe sloping to profound sensorineural hearing loss    Tympanogram:    RIGHT: normal eardrum mobility    LEFT:   normal eardrum mobility    Reflexes (reported by stimulus ear):  RIGHT: Ipsilateral is absent at frequencies tested  RIGHT: Contralateral is absent at frequencies tested  LEFT:   Ipsilateral is absent at frequencies tested  LEFT:   Contralateral is absent at frequencies tested    Speech Reception Threshold:    RIGHT: 75 dB HL    LEFT:   90 dB HL    Word Recognition Score:     RIGHT: 56% at 100 dB HL using NU-6 recorded word list.    LEFT:   16% at 100 dB HL using NU-6 recorded word list.      ASSESSMENT:   Bilateral sensorineural hearing loss    Compared to patient's previous audiogram dated 9/14/2021, hearing has remained stable at all  frequencies in the right ear. Left ear has a decline in hearing of 15-30 dB from 4453-6527 Hz. Word recognition score in the left ear went from 48% to 16% . Today s results were discussed with the patient in detail.     Hearing aid consult to follow    AUDIOLOGY REPORT    OBJECTIVE:    Patient is a hearing aid candidate. Patient would like to move forward with a hearing aid evaluation today. Therefore, the patient was presented with different options for amplification to help aid in communication. Discussed styles, levels of technology and monaural vs. binaural fitting.     The hearing aid(s) mutually chosen were:  Binaural: Phonak Audeo P50-BTE   COLOR: Graphite Grey  BATTERY SIZE: 675  EARMOLD/TIPS: skeleton  CANAL/ LENGTH: NA    NOTE: due to current status of both ears, bilateral ear impressions could not be made today. Hearing aids were chosen, however, they will not be ordered until next appointment on 2/22.     ASSESSMENT:   Reviewed purchase information and warranty information with patient. The 45 day trial period was explained to patient. The patient was given a copy of the [place] Department [employer] consumer brochure on purchasing hearing instruments. Patient risk factors have been provided to the patient in writing prior to the sale of the hearing aid per FDA regulation. The risk factors are also available in the User Instructional Booklet to be presented on the day of the hearing aid fitting. Hearing aid(s) NOT ordered. Hearing aid evaluation completed.    PLAN:   Patient was returned to ENT for follow-up.     Amos is scheduled to return on 2/22 for an updated hearing evaluation and earmold impressions. Hearing aids will be ordered at this appointment. Follow-up appointment on 3/14 will be canceled.       Eliceo Rivero.[email or web address]  Licensed Audiologist  [place] #[id number]     1/25/2023

## 2023-01-31 DIAGNOSIS — Z79.4 TYPE 2 DIABETES MELLITUS WITH DIABETIC PERIPHERAL ANGIOPATHY WITHOUT GANGRENE, WITH LONG-TERM CURRENT USE OF INSULIN (H): Primary | ICD-10-CM

## 2023-01-31 DIAGNOSIS — E11.51 TYPE 2 DIABETES MELLITUS WITH DIABETIC PERIPHERAL ANGIOPATHY WITHOUT GANGRENE, WITH LONG-TERM CURRENT USE OF INSULIN (H): Primary | ICD-10-CM

## 2023-02-03 ENCOUNTER — OFFICE VISIT (OUTPATIENT)
Dept: INTERNAL MEDICINE | Facility: CLINIC | Age: 78
End: 2023-02-03
Payer: COMMERCIAL

## 2023-02-03 VITALS
HEART RATE: 108 BPM | HEIGHT: 74 IN | WEIGHT: 166.1 LBS | SYSTOLIC BLOOD PRESSURE: 164 MMHG | DIASTOLIC BLOOD PRESSURE: 76 MMHG | OXYGEN SATURATION: 99 % | BODY MASS INDEX: 21.32 KG/M2

## 2023-02-03 DIAGNOSIS — M19.041 PRIMARY OSTEOARTHRITIS OF BOTH HANDS: ICD-10-CM

## 2023-02-03 DIAGNOSIS — I10 ESSENTIAL HYPERTENSION: ICD-10-CM

## 2023-02-03 DIAGNOSIS — R00.0 TACHYCARDIA: ICD-10-CM

## 2023-02-03 DIAGNOSIS — Z23 NEED FOR COVID-19 VACCINE: ICD-10-CM

## 2023-02-03 DIAGNOSIS — Z23 NEED FOR INFLUENZA VACCINATION: ICD-10-CM

## 2023-02-03 DIAGNOSIS — J43.2 CENTRILOBULAR EMPHYSEMA (H): ICD-10-CM

## 2023-02-03 DIAGNOSIS — I10 PRIMARY HYPERTENSION: ICD-10-CM

## 2023-02-03 DIAGNOSIS — F17.200 TOBACCO DEPENDENCE SYNDROME: ICD-10-CM

## 2023-02-03 DIAGNOSIS — I73.9 PAD (PERIPHERAL ARTERY DISEASE) (H): Primary | ICD-10-CM

## 2023-02-03 DIAGNOSIS — N18.30 STAGE 3 CHRONIC KIDNEY DISEASE, UNSPECIFIED WHETHER STAGE 3A OR 3B CKD (H): ICD-10-CM

## 2023-02-03 DIAGNOSIS — M19.042 PRIMARY OSTEOARTHRITIS OF BOTH HANDS: ICD-10-CM

## 2023-02-03 DIAGNOSIS — E11.40 TYPE 2 DIABETES, CONTROLLED, WITH NEUROPATHY (H): ICD-10-CM

## 2023-02-03 PROBLEM — L97.309: Status: RESOLVED | Noted: 2022-01-10 | Resolved: 2023-02-03

## 2023-02-03 PROBLEM — I83.003: Status: RESOLVED | Noted: 2022-01-10 | Resolved: 2023-02-03

## 2023-02-03 LAB
ATRIAL RATE - MUSE: 90 BPM
DIASTOLIC BLOOD PRESSURE - MUSE: NORMAL MMHG
INTERPRETATION ECG - MUSE: NORMAL
P AXIS - MUSE: 78 DEGREES
PR INTERVAL - MUSE: 186 MS
QRS DURATION - MUSE: 102 MS
QT - MUSE: 336 MS
QTC - MUSE: 411 MS
R AXIS - MUSE: 85 DEGREES
SYSTOLIC BLOOD PRESSURE - MUSE: NORMAL MMHG
T AXIS - MUSE: 81 DEGREES
VENTRICULAR RATE- MUSE: 90 BPM

## 2023-02-03 PROCEDURE — 0124A COVID-19 VACCINE BIVALENT BOOSTER 12+ (PFIZER): CPT | Performed by: INTERNAL MEDICINE

## 2023-02-03 PROCEDURE — 91312 COVID-19 VACCINE BIVALENT BOOSTER 12+ (PFIZER): CPT | Performed by: INTERNAL MEDICINE

## 2023-02-03 PROCEDURE — 90662 IIV NO PRSV INCREASED AG IM: CPT | Performed by: INTERNAL MEDICINE

## 2023-02-03 PROCEDURE — 99215 OFFICE O/P EST HI 40 MIN: CPT | Mod: 25 | Performed by: INTERNAL MEDICINE

## 2023-02-03 PROCEDURE — G0008 ADMIN INFLUENZA VIRUS VAC: HCPCS | Performed by: INTERNAL MEDICINE

## 2023-02-03 PROCEDURE — 93000 ELECTROCARDIOGRAM COMPLETE: CPT | Performed by: INTERNAL MEDICINE

## 2023-02-03 RX ORDER — LISINOPRIL 5 MG/1
15 TABLET ORAL DAILY
Qty: 270 TABLET | Refills: 1 | Status: SHIPPED | OUTPATIENT
Start: 2023-02-03 | End: 2023-04-19

## 2023-02-03 RX ORDER — FLASH GLUCOSE SENSOR
KIT MISCELLANEOUS
Qty: 6 EACH | Refills: 3 | Status: SHIPPED | OUTPATIENT
Start: 2023-02-03 | End: 2023-04-19

## 2023-02-03 NOTE — NURSING NOTE
Amos Walker received the bivalent Covid booster (Pfizer) and the high dose influenza vaccine in clinic today at the request of Dr. Swift. The immunization sites were cleaned with an alcohol prep wipe. The immunizations were given without incident--see immunization list for administration details. No swelling or redness was observed at the sites of injection after the immunizations were given. Pt has no history of reaction to vaccines. Pt remained in [facility] for at least 15 minutes in case of an adverse reaction.     MORE Grimes at 9:43 AM on 2/3/2023

## 2023-02-03 NOTE — NURSING NOTE
Amos Walker is a 77 year old male patient that presents today in clinic for the following:    Chief Complaint   Patient presents with     Follow Up     Hand Problem     Pt states joint swelling in thumb     The patient's allergies and medications were reviewed as noted. A set of vitals were recorded as noted without incident. The patient does not have any other questions for the provider.    Chiquita Pacheco, EMT at 8:32 AM on 2/3/2023

## 2023-02-03 NOTE — PATIENT INSTRUCTIONS
To schedule your leadless EKG monitor (zio patch), please call cardiovascular clinic at [phone]      Get lab work done on 3/3/2023 when you see Dr. Mcclain    To schedule your Xray, please call [phone]     Would consider getting shingles shot at pharmacy.    5/11/2022 Covid vaccine Pfizer lot number [id number]

## 2023-02-03 NOTE — PROGRESS NOTES
History of Present Illness:  Mr. Walker is a 77 year old adult who presents for  Chief Complaint   Patient presents with     Follow Up     Hand Problem     Pt states joint swelling in thumb     PMH involving tobacco use, DM, HTN, CAD, PAD, Right Charcot foot, diabetic neuropathy, emphysema, tobacco use, anemia of chronic disease, MGUS.    Amos has seen ENT for hearing loss, cerumen, obstructed hearing, otitis externa. He feels they are improving with drops. He is planning to get new hearing aids.    Sees ophtho Monday. Continues to follow with podiatry for shin wound but pretty much healed.  His did have repeat ABIs which showed improvement.  He attributes to walking a lot during his move. He feels clumsy when walking.  NO aching/pain when walking.    He is worried about L PIP joint on thumb.    Taking 2.5 x 5 of lisinopril.  systolic.  For DM, taking meal time insulin and trulicity.  Pulse is elevated today, sometimes gets irregular readings on home machine.    He sometimes forgets to take alendronate.      Routine Health Maintenance  Immunizations:   Most Recent Immunizations   Administered Date(s) Administered     COVID-19,PF,Pfizer 05/06/2021     Influenza (High Dose) 3 valent vaccine 10/04/2019     Influenza (IIV3) PF 11/01/2013     Influenza, Quad, High Dose, Pf, 65yr + 10/05/2020     Pneumo Conj 13-V (2010&after) 11/03/2014     Pneumococcal 23 valent 12/21/2015     TDAP Vaccine (Boostrix) 03/21/2016      Lipids:   Recent Labs   Lab Test 09/08/22  1645 08/16/21  1224 12/17/15  0942 11/18/14  0853   CHOL 124 109   < > 110   HDL 49 60   < > 45   LDL 49 33   < > 45   TRIG 129 80   < > 100   CHOLHDLRATIO  --   --   --  2.4    < > = values in this interval not displayed.      PSA (50-75 yrs): No results found for: PSA  DEXA: 7/21 FRAX elevated, multiple fractures, qualifies for treatment, started alendronate 5/22  AAA Screening (65-75 yrs): CT 12/17, negative  Lung Ca Screening (>30 py 55-79 or >20 py  50-79 + RF): 7/20, 7/21, 7/22  Colonoscopy (50-75 yrs): due 6/24, 6/19 Impression:          - The examined portion of the ileum was normal.                        - One 5 mm polyp in the cecum, removed with a cold snare                        and removed using injection-lift and a cold snare.                        Resected and retrieved.                        - One 6 mm polyp in the transverse colon, removed with a                        hot snare and removed using injection-lift and a hot                        snare. Resected and retrieved. Clip was placed.                        - One 5 mm polyp in the sigmoid colon, removed with a                        hot snare. Resected and retrieved.                        - The examination was otherwise normal on direct and                        retroflexion views.                        NOTE: the polyp reported as being in the descending                        colon in the prior colonoscopy report appears on images                        in the transverse colon; that is where we removed a                        likely SSA. The descending colon was inspected on                        multiple occasions and no polyps were identified.   Recommendation:      - Return to referring physician.                        - Repeat colonoscopy in 5 years for surveillance.                                                                           HIV/HCV if risk factors: nonreactive HepC 6/16  Safety/Lifestyle: reviewed  Tob/EtOH: declines quitting  Depression:   PHQ-2 Score:     PHQ-2 ( 1999 Pfizer) 1/5/2021 5/18/2020   Q1: Little interest or pleasure in doing things 0 0   Q2: Feeling down, depressed or hopeless 1 1   PHQ-2 Score 1 1         Review of external notes as documented above                   A detailed Review of Systems was performed, verified and is negative except as documented in the HPI.  All health questionnaires were reviewed, verified and relevant information  documented above.      Past Medical History:  Past Medical History:   Diagnosis Date     Anemia      CAD (coronary artery disease)     2V CAD involving LAD and RCA, s/p DESx4 in 3/18     CKD (chronic kidney disease) stage 3, GFR 30-59 ml/min (H)      Colon polyp      Diabetic Charcot foot (H)      Emphysema of lung (H)     noted on CT     Heart disease      HTN (hypertension)      Hyperlipidemia      MRSA cellulitis of right foot     in past.      Osteopenia of both hips      PAD (peripheral artery disease) (H) 09/2018    s/p R femoral enarterectomy and stenting      Tobacco use     50+ pack     Type 2 diabetes mellitus (H)     for 25 yrs.  on insulin and starlix     Venous ulcer (H)        Active Meds:  Current Outpatient Medications   Medication     acetaminophen (TYLENOL) 325 MG tablet     alendronate (FOSAMAX) 70 MG tablet     ammonium lactate (LAC-HYDRIN) 12 % external cream     amoxicillin-clavulanate (AUGMENTIN) 875-125 MG tablet     ascorbic acid 500 MG TABS     aspirin 81 MG EC tablet     calcium carbonate (OS-CLAUDIA) 500 MG tablet     cetirizine (ZYRTEC) 10 MG tablet     clopidogrel (PLAVIX) 75 MG tablet     Continuous Blood Gluc  (FREESTYLE LATOYA 14 DAY READER) TANIA     ferrous sulfate (FEROSUL) 325 (65 Fe) MG tablet     gentamicin (GARAMYCIN) 0.1 % external cream     insulin lispro (HUMALOG KWIKPEN) 100 UNIT/ML (1 unit dial) KWIKPEN     insulin pen needle (B-D U/F) 31G X 8 MM miscellaneous     ketoconazole (NIZORAL) 2 % external shampoo     lisinopril (ZESTRIL) 2.5 MG tablet     silver sulfADIAZINE (SSD) 1 % external cream     simvastatin (ZOCOR) 40 MG tablet     triamcinolone (KENALOG) 0.025 % external ointment     triamcinolone (KENALOG) 0.1 % external cream     triamcinolone (KENALOG) 0.1 % external cream     triamcinolone (KENALOG) 0.1 % external ointment     TRULICITY 1.5 MG/0.5ML pen     VITAMIN D, CHOLECALCIFEROL, PO     blood glucose monitoring (FREESTYLE) lancets     Continuous Blood Gluc  "Sensor (FREESTYLE LATOYA 14 DAY SENSOR) [facility] [place] [facility]     ONETOUCH ULTRA test strip     No current facility-administered medications for this visit.        Allergies:  Reviewed, refer to EMR    Relevant Social History:  Social History     Tobacco Use     Smoking status: Every Day     Packs/day: 0.25     Years: 50.00     Pack years: 12.50     Types: Cigarettes     Smokeless tobacco: Never   Substance Use Topics     Alcohol use: No     Drug use: No        Physical Exam:  Vitals: BP (!) 164/76 (BP Location: Right arm, Patient Position: Sitting, Cuff Size: Adult Regular)   Pulse 108   Ht 1.892 m (6' 2.49\")   Wt 75.3 kg (166 lb 1.6 oz)   SpO2 99%   BMI 21.05 kg/m    Constitutional: Alert, oriented, pleasant, no acute distress  Head: Normocephalic, atraumatic  Eyes: Extra-ocular movements intact, pupils equally round bilaterally, no scleral icterus  ENT: Oropharynx clear, moist mucus membranes, good dentition, L ear canal clear, wearing bilat hearing aids  Neck: Supple, no lymphadenopathy  Cardiovascular: Tachy, regular, no rubs or gallops  Respiratory: Good air movement bilaterally, lungs clear, no wheezes/rales/rhonchi  Musculoskeletal: No edema, normal muscle tone, normal gait, squaring of thumb base, not warm/swollen  Neurologic: Alert and oriented, cranial nerves 2-12 intact, grossly non-focal  Psychiatric: normal mentation, affect and mood      Diagnostics:  Labs reviewed in Epic    EKG 90, sinus, no acute changes          Assessment and Plan:  Amso was seen today for follow up and hand problem.    Diagnoses and all orders for this visit:    PAD (peripheral artery disease) (H)  Improved on last ABIs, walking more, encouraged him on this front. Cont plavix and ASA.    Centrilobular emphysema (H)  Denies any symptoms.    Stage 3 chronic kidney disease, unspecified whether stage 3a or 3b CKD (H)  Stable recently.    Primary osteoarthritis of both hands  -     Occupational Therapy Referral; Future  -     XR Hand Left G/E 3 " Views; Future    Tachycardia  High risk for afib, will perform ambulatory monitoring.  -     EKG 12-lead complete w/read - Clinics  -     Adult Leadless EKG Monitor 8 to 14 Days; Future    Need for COVID-19 vaccine  -     COVID-19 VACCINE BIVALENT BOOSTER 12+ (PFIZER)    Need for influenza vaccination  -     INFLUENZA VACCINE 65+ (FLUZONE HD)    Primary hypertension  Elevated today but reports lower at home. He is taking 12.5 mg of lisinopril. Discussed changing back to 5 mg tabs for ease of use/calculation.    Type 2 diabetes, controlled, with neuropathy (H)  Typically controlled. He is on baseline trulicity and mealtime insulin which seems to be working.  -     Hemoglobin A1c FUTURE 3mo; Future    Tobacco dependence syndrome  Despite tobacco readiness score, he declines true interest in quitting today. Would like to defer until future.          Racheal Swift MD  Internal Medicine        >40 minutes spent today performing chart review, history and exam, counseling, care coordination, documentation and further activities as noted above exclusive of any procedures or EKG interpretation

## 2023-02-06 ENCOUNTER — OFFICE VISIT (OUTPATIENT)
Dept: PODIATRY | Facility: CLINIC | Age: 78
End: 2023-02-06
Payer: COMMERCIAL

## 2023-02-06 DIAGNOSIS — B35.1 ONYCHOMYCOSIS: ICD-10-CM

## 2023-02-06 DIAGNOSIS — I87.8 VENOUS STASIS: ICD-10-CM

## 2023-02-06 DIAGNOSIS — L97.521 SKIN ULCER OF LEFT FOOT, LIMITED TO BREAKDOWN OF SKIN (H): ICD-10-CM

## 2023-02-06 DIAGNOSIS — L97.322 SKIN ULCER OF LEFT ANKLE WITH FAT LAYER EXPOSED (H): ICD-10-CM

## 2023-02-06 DIAGNOSIS — L08.9 INFECTED ABRASION OF LEFT LOWER EXTREMITY, SEQUELA: ICD-10-CM

## 2023-02-06 DIAGNOSIS — E11.610 CHARCOT FOOT DUE TO DIABETES MELLITUS (H): ICD-10-CM

## 2023-02-06 DIAGNOSIS — E11.49 TYPE II OR UNSPECIFIED TYPE DIABETES MELLITUS WITH NEUROLOGICAL MANIFESTATIONS, NOT STATED AS UNCONTROLLED(250.60) (H): ICD-10-CM

## 2023-02-06 DIAGNOSIS — S80.812S INFECTED ABRASION OF LEFT LOWER EXTREMITY, SEQUELA: ICD-10-CM

## 2023-02-06 DIAGNOSIS — E11.40 TYPE 2 DIABETES, CONTROLLED, WITH NEUROPATHY (H): ICD-10-CM

## 2023-02-06 DIAGNOSIS — E11.51 DIABETES MELLITUS WITH PERIPHERAL VASCULAR DISEASE (H): Primary | ICD-10-CM

## 2023-02-06 PROCEDURE — 99214 OFFICE O/P EST MOD 30 MIN: CPT | Performed by: PODIATRIST

## 2023-02-06 NOTE — PROGRESS NOTES
Chief Complaint - ear drainage and hearing loss    History of Present Illness - Amos Walker is a 77 year old male who returns to me today to recheck left OE. It is itchy. He wears aids. No pain.  It has been present and noticeable for approximately 2 months. The patient has noted some otorrhea. I gave him ciprodex a couple weeks ago. He returns and notes no itchiness. No pain. No drainage.    Tests personally reviewed today for this visit:   1.) audiogram 1/25/23 shows severe to profound sensorineural hearing loss, left worse than right.   2.) type A bilaterally    Past Medical History -   Patient Active Problem List   Diagnosis     Senile nuclear sclerosis     PVD (peripheral vascular disease) (H)     HTN (hypertension)     CKD (chronic kidney disease) stage 3, GFR 30-59 ml/min (H)     Type 2 diabetes, controlled, with neuropathy (H)     Diabetes mellitus with peripheral vascular disease (H)     Fracture of neck of femur (H)     Aftercare following joint replacement [Z47.1]     Long-term (current) use of anticoagulants [Z79.01]     Status post left heart catheterization     Status post coronary angiogram     Critical lower limb ischemia (H)     Non-healing ulcer (H)     Atherosclerosis of native artery of left lower extremity with ulceration of ankle (H)     Atherosclerosis of native arteries of right leg with ulceration of other part of foot (H)     Type II or unspecified type diabetes mellitus with neurological manifestations, not stated as uncontrolled(250.60) (H)     Charcot foot due to diabetes mellitus (H)     Venous stasis     Ulcer of right lower extremity, limited to breakdown of skin (H)     Colitis presumed infectious     Hypotension, unspecified hypotension type     Bright red blood per rectum     Adjustment disorder with depressed mood     Centrilobular emphysema (H)     PAD (peripheral artery disease) (H)     Closed fracture of left olecranon process       Current Medications -   Current Outpatient  Medications:      acetaminophen (TYLENOL) 325 MG tablet, Take 2 tablets (650 mg) by mouth every 4 hours as needed for other (multimodal surgical pain management along with NSAIDS and opioid medication as indicated based on pain control and physical function.), Disp: , Rfl:      alendronate (FOSAMAX) 70 MG tablet, Take 1 tablet (70 mg) by mouth every 7 days Take on empty stomach for 30 min with full glass of water, dont lay down, Disp: 12 tablet, Rfl: 3     ammonium lactate (LAC-HYDRIN) 12 % external cream, Apply topically 2 times daily as needed for dry skin, Disp: 385 g, Rfl: 3     amoxicillin-clavulanate (AUGMENTIN) 875-125 MG tablet, Take 1 tablet by mouth 2 times daily, Disp: 28 tablet, Rfl: 0     ascorbic acid 500 MG TABS, Take 1 tablet (500 mg) by mouth daily, Disp: 100 tablet, Rfl: 3     aspirin 81 MG EC tablet, Take 81 mg by mouth daily, Disp: , Rfl:      blood glucose monitoring (FREESTYLE) lancets, Use to test blood sugars 4 times daily as directed., Disp: 100 each, Rfl: 11     calcium carbonate (OS-CLAUDIA) 500 MG tablet, Take 1 tablet by mouth 2 times daily, Disp: , Rfl:      cetirizine (ZYRTEC) 10 MG tablet, Take 1 tablet (10 mg) by mouth daily, Disp: 90 tablet, Rfl: 1     clopidogrel (PLAVIX) 75 MG tablet, TAKE 1 TABLET(75 MG) BY MOUTH DAILY, Disp: 90 tablet, Rfl: 1     Continuous Blood Gluc  (FREESTYLE LATOYA 14 DAY READER) TANIA, 1 Device every hour as needed (every hour prn), Disp: 1 Device, Rfl: 0     Continuous Blood Gluc Sensor (FREESTYLE LATOYA 14 DAY SENSOR) MISC, REPLACE SENSOR EVERY 14 DAYS, Disp: 6 each, Rfl: 3     ferrous sulfate (FEROSUL) 325 (65 Fe) MG tablet, Take 1 tablet (325 mg) by mouth daily (with breakfast), Disp: 100 tablet, Rfl: 3     gentamicin (GARAMYCIN) 0.1 % external cream, Apply topically daily To left ulcers., Disp: 60 g, Rfl: 1     insulin lispro (HUMALOG KWIKPEN) 100 UNIT/ML (1 unit dial) KWIKPEN, INJECT 4 UNITS UNDER THE SKIN WITH BREAKFAST, AND 3 UNITS WITH LUNCH AND  4 UNITS WITH DINNER, Disp: 15 mL, Rfl: 1     insulin pen needle (B-D U/F) 31G X 8 MM miscellaneous, USE AS DIRECTED TO TEST FOUR TIMES DAILY, Disp: 400 each, Rfl: 2     ketoconazole (NIZORAL) 2 % external shampoo, Apply topically twice a week Leave on for 3-5 min then rinse off; after 2-4 weeks use only once weekly, Disp: 120 mL, Rfl: 3     lisinopril (ZESTRIL) 5 MG tablet, Take 3 tablets (15 mg) by mouth daily, Disp: 270 tablet, Rfl: 1     ONETOUCH ULTRA test strip, TEST TWICE DAILY AS DIRECTED, Disp: 200 strip, Rfl: 3     silver sulfADIAZINE (SSD) 1 % external cream, Apply topically to the affected area on right foot and leg as directed., Disp: 85 g, Rfl: 11     simvastatin (ZOCOR) 40 MG tablet, Take 1 tablet (40 mg) by mouth At Bedtime, Disp: 90 tablet, Rfl: 2     triamcinolone (KENALOG) 0.025 % external ointment, Apply twice daily to skin around eyes and mouth for 2 weeks, then use twice daily for 2 days per week only., Disp: 15 g, Rfl: 1     triamcinolone (KENALOG) 0.1 % external cream, Apply topically daily To affected areas on feet and legs., Disp: 45 g, Rfl: 1     triamcinolone (KENALOG) 0.1 % external cream, Apply to arms twice daily for 14 days, then use twice daily 2 times per week only, Disp: 80 g, Rfl: 1     triamcinolone (KENALOG) 0.1 % external ointment, Apply topically 2 times daily, Disp: 30 g, Rfl: 0     TRULICITY 1.5 MG/0.5ML pen, ADMINISTER 1.5 MG UNDER THE SKIN EVERY 7 DAYS, Disp: 6 mL, Rfl: 1     VITAMIN D, CHOLECALCIFEROL, PO, Take 1,000 Units by mouth 2 times daily, Disp: , Rfl:     Allergies -   Allergies   Allergen Reactions     Seasonal Allergies      Simvastatin Other (See Comments)     Sun sensitivty     Lisinopril Dizziness     Sun sensitive     Methylisothiazolinone Rash     Neomycin      Wound gets worse     Povidone Iodine Rash       Social History -   Social History     Socioeconomic History     Marital status:      Spouse name: None     Number of children: None     Years of  education: None     Highest education level: None   Tobacco Use     Smoking status: Every Day     Packs/day: 0.25     Years: 50.00     Pack years: 12.50     Types: Cigarettes     Smokeless tobacco: Never   Substance and Sexual Activity     Alcohol use: No     Drug use: No   Social History Narrative    3 sons, [place], [facility], [place]       Family History -   Family History   Problem Relation Age of Onset     Cancer Father         colon     Kidney Disease Father      Kidney Disease Mother      Cardiovascular Son         MI in 40s     Macular Degeneration Brother      Glaucoma No family hx of      Melanoma No family hx of      Skin Cancer No family hx of      Physical Exam  General - The patient is in no distress.  Alert and oriented to person and place, answers questions and cooperates with examination appropriately.   Voice and Breathing - The patient was breathing comfortably without the use of accessory muscles. There was no wheezing, stridor, or stertor.  The patients voice was clear and strong.  Ears - The left ear was examined. It showed no erythema or edema of the canal. There was no debri impacting the canal. The left tympanic membrane is intact. No effusion or infection. The right canal was nonedematous. No evidence of infection. The tympanic membrane was intact and the middle ear was well aerated.     Assessment and Plan - Amos Walker is a 77 year old male who had a left ear otitis externa. It has cleared up. Continue hearing aids. Return prn.     Dominic Archuleta MD  Otolaryngology  [facility]
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
## 2023-02-06 NOTE — NURSING NOTE
Amos Walker's chief complaint for this visit includes:  Chief Complaint   Patient presents with     Wound Check     Bilateral ulcers on ankles      PCP: Racheal Swift    Referring Provider:  No referring provider defined for this encounter.    There were no vitals taken for this visit.  Data Unavailable        Allergies   Allergen Reactions     Seasonal Allergies      Simvastatin Other (See Comments)     Sun sensitivty     Lisinopril Dizziness     Sun sensitive     Methylisothiazolinone Rash     Neomycin      Wound gets worse     Povidone Iodine Rash         Do you need any medication refills at today's visit?

## 2023-02-10 ENCOUNTER — OFFICE VISIT (OUTPATIENT)
Dept: OTOLARYNGOLOGY | Facility: CLINIC | Age: 78
End: 2023-02-10
Payer: COMMERCIAL

## 2023-02-10 VITALS
OXYGEN SATURATION: 96 % | HEART RATE: 100 BPM | RESPIRATION RATE: 22 BRPM | DIASTOLIC BLOOD PRESSURE: 72 MMHG | SYSTOLIC BLOOD PRESSURE: 138 MMHG

## 2023-02-10 DIAGNOSIS — H60.393 INFECTIVE OTITIS EXTERNA, BILATERAL: Primary | ICD-10-CM

## 2023-02-10 PROCEDURE — 99212 OFFICE O/P EST SF 10 MIN: CPT | Performed by: OTOLARYNGOLOGY

## 2023-02-10 NOTE — LETTER
2/10/2023         RE: Amos Walker  [address]  [place] [address]        Dear Colleague,    Thank you for referring your patient, Amos Walker, to the [facility]. Please see a copy of my visit note below.    Chief Complaint - ear drainage and hearing loss    History of Present Illness - Amos Walker is a 77 year old male who returns to me today to recheck left OE. It is itchy. He wears aids. No pain.  It has been present and noticeable for approximately 2 months. The patient has noted some otorrhea. I gave him ciprodex a couple weeks ago. He returns and notes no itchiness. No pain. No drainage.    Tests personally reviewed today for this visit:   1.) audiogram 1/25/23 shows severe to profound sensorineural hearing loss, left worse than right.   2.) type A bilaterally    Past Medical History -   Patient Active Problem List   Diagnosis     Senile nuclear sclerosis     PVD (peripheral vascular disease) (H)     HTN (hypertension)     CKD (chronic kidney disease) stage 3, GFR 30-59 ml/min (H)     Type 2 diabetes, controlled, with neuropathy (H)     Diabetes mellitus with peripheral vascular disease (H)     Fracture of neck of femur (H)     Aftercare following joint replacement [Z47.1]     Long-term (current) use of anticoagulants [Z79.01]     Status post left heart catheterization     Status post coronary angiogram     Critical lower limb ischemia (H)     Non-healing ulcer (H)     Atherosclerosis of native artery of left lower extremity with ulceration of ankle (H)     Atherosclerosis of native arteries of right leg with ulceration of other part of foot (H)     Type II or unspecified type diabetes mellitus with neurological manifestations, not stated as uncontrolled(250.60) (H)     Charcot foot due to diabetes mellitus (H)     Venous stasis     Ulcer of right lower extremity, limited to breakdown of skin (H)     Colitis presumed infectious     Hypotension, unspecified  hypotension type     Bright red blood per rectum     Adjustment disorder with depressed mood     Centrilobular emphysema (H)     PAD (peripheral artery disease) (H)     Closed fracture of left olecranon process       Current Medications -   Current Outpatient Medications:      acetaminophen (TYLENOL) 325 MG tablet, Take 2 tablets (650 mg) by mouth every 4 hours as needed for other (multimodal surgical pain management along with NSAIDS and opioid medication as indicated based on pain control and physical function.), Disp: , Rfl:      alendronate (FOSAMAX) 70 MG tablet, Take 1 tablet (70 mg) by mouth every 7 days Take on empty stomach for 30 min with full glass of water, dont lay down, Disp: 12 tablet, Rfl: 3     ammonium lactate (LAC-HYDRIN) 12 % external cream, Apply topically 2 times daily as needed for dry skin, Disp: 385 g, Rfl: 3     amoxicillin-clavulanate (AUGMENTIN) 875-125 MG tablet, Take 1 tablet by mouth 2 times daily, Disp: 28 tablet, Rfl: 0     ascorbic acid 500 MG TABS, Take 1 tablet (500 mg) by mouth daily, Disp: 100 tablet, Rfl: 3     aspirin 81 MG EC tablet, Take 81 mg by mouth daily, Disp: , Rfl:      blood glucose monitoring (FREESTYLE) lancets, Use to test blood sugars 4 times daily as directed., Disp: 100 each, Rfl: 11     calcium carbonate (OS-CLAUDIA) 500 MG tablet, Take 1 tablet by mouth 2 times daily, Disp: , Rfl:      cetirizine (ZYRTEC) 10 MG tablet, Take 1 tablet (10 mg) by mouth daily, Disp: 90 tablet, Rfl: 1     clopidogrel (PLAVIX) 75 MG tablet, TAKE 1 TABLET(75 MG) BY MOUTH DAILY, Disp: 90 tablet, Rfl: 1     Continuous Blood Gluc  (FREESTYLE LATOYA 14 DAY READER) TANIA, 1 Device every hour as needed (every hour prn), Disp: 1 Device, Rfl: 0     Continuous Blood Gluc Sensor (FREESTYLE LATOYA 14 DAY SENSOR) MISC, REPLACE SENSOR EVERY 14 DAYS, Disp: 6 each, Rfl: 3     ferrous sulfate (FEROSUL) 325 (65 Fe) MG tablet, Take 1 tablet (325 mg) by mouth daily (with breakfast), Disp: 100 tablet,  Rfl: 3     gentamicin (GARAMYCIN) 0.1 % external cream, Apply topically daily To left ulcers., Disp: 60 g, Rfl: 1     insulin lispro (HUMALOG KWIKPEN) 100 UNIT/ML (1 unit dial) KWIKPEN, INJECT 4 UNITS UNDER THE SKIN WITH BREAKFAST, AND 3 UNITS WITH LUNCH AND 4 UNITS WITH DINNER, Disp: 15 mL, Rfl: 1     insulin pen needle (B-D U/F) 31G X 8 MM miscellaneous, USE AS DIRECTED TO TEST FOUR TIMES DAILY, Disp: 400 each, Rfl: 2     ketoconazole (NIZORAL) 2 % external shampoo, Apply topically twice a week Leave on for 3-5 min then rinse off; after 2-4 weeks use only once weekly, Disp: 120 mL, Rfl: 3     lisinopril (ZESTRIL) 5 MG tablet, Take 3 tablets (15 mg) by mouth daily, Disp: 270 tablet, Rfl: 1     ONETOUCH ULTRA test strip, TEST TWICE DAILY AS DIRECTED, Disp: 200 strip, Rfl: 3     silver sulfADIAZINE (SSD) 1 % external cream, Apply topically to the affected area on right foot and leg as directed., Disp: 85 g, Rfl: 11     simvastatin (ZOCOR) 40 MG tablet, Take 1 tablet (40 mg) by mouth At Bedtime, Disp: 90 tablet, Rfl: 2     triamcinolone (KENALOG) 0.025 % external ointment, Apply twice daily to skin around eyes and mouth for 2 weeks, then use twice daily for 2 days per week only., Disp: 15 g, Rfl: 1     triamcinolone (KENALOG) 0.1 % external cream, Apply topically daily To affected areas on feet and legs., Disp: 45 g, Rfl: 1     triamcinolone (KENALOG) 0.1 % external cream, Apply to arms twice daily for 14 days, then use twice daily 2 times per week only, Disp: 80 g, Rfl: 1     triamcinolone (KENALOG) 0.1 % external ointment, Apply topically 2 times daily, Disp: 30 g, Rfl: 0     TRULICITY 1.5 MG/0.5ML pen, ADMINISTER 1.5 MG UNDER THE SKIN EVERY 7 DAYS, Disp: 6 mL, Rfl: 1     VITAMIN D, CHOLECALCIFEROL, PO, Take 1,000 Units by mouth 2 times daily, Disp: , Rfl:     Allergies -   Allergies   Allergen Reactions     Seasonal Allergies      Simvastatin Other (See Comments)     Sun sensitivty     Lisinopril Dizziness     Sun  sensitive     Methylisothiazolinone Rash     Neomycin      Wound gets worse     Povidone Iodine Rash       Social History -   Social History     Socioeconomic History     Marital status:      Spouse name: None     Number of children: None     Years of education: None     Highest education level: None   Tobacco Use     Smoking status: Every Day     Packs/day: 0.25     Years: 50.00     Pack years: 12.50     Types: Cigarettes     Smokeless tobacco: Never   Substance and Sexual Activity     Alcohol use: No     Drug use: No   Social History Narrative    3 sons, [place], [facility]       Family History -   Family History   Problem Relation Age of Onset     Cancer Father         colon     Kidney Disease Father      Kidney Disease Mother      Cardiovascular Son         MI in 40s     Macular Degeneration Brother      Glaucoma No family hx of      Melanoma No family hx of      Skin Cancer No family hx of      Physical Exam  General - The patient is in no distress.  Alert and oriented to person and place, answers questions and cooperates with examination appropriately.   Voice and Breathing - The patient was breathing comfortably without the use of accessory muscles. There was no wheezing, stridor, or stertor.  The patients voice was clear and strong.  Ears - The left ear was examined. It showed no erythema or edema of the canal. There was no debri impacting the canal. The left tympanic membrane is intact. No effusion or infection. The right canal was nonedematous. No evidence of infection. The tympanic membrane was intact and the middle ear was well aerated.     Assessment and Plan - Amos Walker is a 77 year old male who had a left ear otitis externa. It has cleared up. Continue hearing aids. Return prn.     Dominic Archuleta MD  Otolaryngology  [facility]        Again, thank you for allowing me to participate in the care of your patient.        Sincerely,        Dominic Archuleta MD

## 2023-02-15 ENCOUNTER — MYC MEDICAL ADVICE (OUTPATIENT)
Dept: PODIATRY | Facility: CLINIC | Age: 78
End: 2023-02-15
Payer: COMMERCIAL

## 2023-02-15 DIAGNOSIS — E11.3293 TYPE 2 DIABETES MELLITUS WITH BOTH EYES AFFECTED BY MILD NONPROLIFERATIVE RETINOPATHY WITHOUT MACULAR EDEMA, WITH LONG-TERM CURRENT USE OF INSULIN (H): Primary | ICD-10-CM

## 2023-02-15 DIAGNOSIS — Z79.4 TYPE 2 DIABETES MELLITUS WITH BOTH EYES AFFECTED BY MILD NONPROLIFERATIVE RETINOPATHY WITHOUT MACULAR EDEMA, WITH LONG-TERM CURRENT USE OF INSULIN (H): Primary | ICD-10-CM

## 2023-02-16 RX ORDER — AMMONIUM LACTATE 12 G/100G
CREAM TOPICAL 2 TIMES DAILY PRN
Qty: 385 G | Refills: 3 | Status: SHIPPED | OUTPATIENT
Start: 2023-02-16 | End: 2024-03-29

## 2023-02-20 ENCOUNTER — OFFICE VISIT (OUTPATIENT)
Dept: PODIATRY | Facility: CLINIC | Age: 78
End: 2023-02-20
Payer: COMMERCIAL

## 2023-02-20 DIAGNOSIS — L08.9 INFECTED ABRASION OF LEFT LOWER EXTREMITY, SEQUELA: ICD-10-CM

## 2023-02-20 DIAGNOSIS — B35.1 ONYCHOMYCOSIS: ICD-10-CM

## 2023-02-20 DIAGNOSIS — S80.812S INFECTED ABRASION OF LEFT LOWER EXTREMITY, SEQUELA: ICD-10-CM

## 2023-02-20 DIAGNOSIS — E11.49 TYPE II OR UNSPECIFIED TYPE DIABETES MELLITUS WITH NEUROLOGICAL MANIFESTATIONS, NOT STATED AS UNCONTROLLED(250.60) (H): ICD-10-CM

## 2023-02-20 DIAGNOSIS — E11.610 CHARCOT FOOT DUE TO DIABETES MELLITUS (H): ICD-10-CM

## 2023-02-20 DIAGNOSIS — L97.322 SKIN ULCER OF LEFT ANKLE WITH FAT LAYER EXPOSED (H): ICD-10-CM

## 2023-02-20 DIAGNOSIS — E11.51 DIABETES MELLITUS WITH PERIPHERAL VASCULAR DISEASE (H): Primary | ICD-10-CM

## 2023-02-20 DIAGNOSIS — I87.8 VENOUS STASIS: ICD-10-CM

## 2023-02-20 PROCEDURE — 99214 OFFICE O/P EST MOD 30 MIN: CPT | Performed by: PODIATRIST

## 2023-02-20 NOTE — NURSING NOTE
Amos Walker's chief complaint for this visit includes:  Chief Complaint   Patient presents with     RECHECK     Ulcer F/U doing ok     PCP: Racheal Swift    Referring Provider:  No referring provider defined for this encounter.    There were no vitals taken for this visit.  Data Unavailable        Allergies   Allergen Reactions     Seasonal Allergies      Simvastatin Other (See Comments)     Sun sensitivty     Lisinopril Dizziness     Sun sensitive     Methylisothiazolinone Rash     Neomycin      Wound gets worse     Povidone Iodine Rash         Do you need any medication refills at today's visit?
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
## 2023-02-22 ENCOUNTER — OFFICE VISIT (OUTPATIENT)
Dept: AUDIOLOGY | Facility: CLINIC | Age: 78
End: 2023-02-22
Payer: COMMERCIAL

## 2023-02-22 DIAGNOSIS — H90.3 SENSORINEURAL HEARING LOSS (SNHL) OF BOTH EARS: Primary | ICD-10-CM

## 2023-02-22 PROCEDURE — 92550 TYMPANOMETRY & REFLEX THRESH: CPT

## 2023-02-22 PROCEDURE — 92557 COMPREHENSIVE HEARING TEST: CPT

## 2023-02-22 PROCEDURE — V5275 EAR IMPRESSION: HCPCS

## 2023-02-22 NOTE — PROGRESS NOTES
AUDIOLOGY REPORT    SUBJECTIVE:  Amos Walker is a 77 year old male who was seen in the Audiology Clinic at the [facility] for a follow-up audiologic evaluation and earmold impressions. Patient was previously seen on 1/25/2023 where he was treated for otitis externa. Today he reports an improvement in left ear hearing and would like to move forward with new amplification. (See previous audiology report for more background information)    OBJECTIVE:    Otoscopic exam indicates ears are clear of cerumen bilaterally     Pure Tone Thresholds assessed using conventional audiometry with good  reliability from 250-8000 Hz bilaterally using insert earphones and circumaural headphones     RIGHT:  moderate-severe sloping to severe sensorineural hearing loss    LEFT:    moderate-severe sloping to severe sensorineural hearing loss    Tympanogram:    RIGHT: normal eardrum mobility    LEFT:   normal eardrum mobility    Reflexes (reported by stimulus ear):  RIGHT: Ipsilateral is absent at frequencies tested  RIGHT: Contralateral is absent at frequencies tested  LEFT:   Ipsilateral is absent at frequencies tested  LEFT:   Contralateral is absent at frequencies tested      Speech Reception Threshold:    RIGHT: 75 dB HL    LEFT:   75 dB HL  Word Recognition Score:     RIGHT: 48% at 100 dB HL using NU-6 recorded word list.    LEFT:   68% at 100 dB HL using NU-6 recorded word list.      ASSESSMENT:   Moderately-severe to severe sensorineural hearing loss, bilaterally. Compared to patient's previous audiogram dated 1/25/2023, hearing has remained stable in the right ear and has improved at all frequencies with the exception of 250 Hz which has remained stable.    AUDIOLOGY REPORT  Patient had previously picked-out Phonak BTE hearing aids, however, he has changed his mind and would like to go with different hearing aids.     The hearing aid(s) mutually chosen were:  Binaural: Phonak Audeo L50-RT MOIRA  COLOR:  graphite gray  BATTERY SIZE: rechargeable  EARMOLD/TIPS: cshell with canal lock  CANAL/ LENGTH: 2UP    Otoscopy revealed ears are clear of cerumen bilaterally. Bilateral earmolds were taken without incident.    PLAN: Amos is scheduled to return in 2-3 weeks for a hearing aid fitting and programming. Purchase agreement will be completed on that date. Please contact this clinic with any questions or concerns.      Eliceo [email or web address]  Licensed Audiologist  [place] #[id number]

## 2023-03-03 ENCOUNTER — OFFICE VISIT (OUTPATIENT)
Dept: PODIATRY | Facility: CLINIC | Age: 78
End: 2023-03-03
Payer: COMMERCIAL

## 2023-03-03 ENCOUNTER — ANCILLARY PROCEDURE (OUTPATIENT)
Dept: GENERAL RADIOLOGY | Facility: CLINIC | Age: 78
End: 2023-03-03
Attending: INTERNAL MEDICINE
Payer: COMMERCIAL

## 2023-03-03 DIAGNOSIS — E11.51 DIABETES MELLITUS WITH PERIPHERAL VASCULAR DISEASE (H): Primary | ICD-10-CM

## 2023-03-03 DIAGNOSIS — M19.041 PRIMARY OSTEOARTHRITIS OF BOTH HANDS: ICD-10-CM

## 2023-03-03 DIAGNOSIS — L97.322 SKIN ULCER OF LEFT ANKLE WITH FAT LAYER EXPOSED (H): ICD-10-CM

## 2023-03-03 DIAGNOSIS — L97.521 SKIN ULCER OF LEFT FOOT, LIMITED TO BREAKDOWN OF SKIN (H): ICD-10-CM

## 2023-03-03 DIAGNOSIS — E11.49 TYPE II OR UNSPECIFIED TYPE DIABETES MELLITUS WITH NEUROLOGICAL MANIFESTATIONS, NOT STATED AS UNCONTROLLED(250.60) (H): ICD-10-CM

## 2023-03-03 DIAGNOSIS — M19.042 PRIMARY OSTEOARTHRITIS OF BOTH HANDS: ICD-10-CM

## 2023-03-03 DIAGNOSIS — E11.610 CHARCOT FOOT DUE TO DIABETES MELLITUS (H): ICD-10-CM

## 2023-03-03 PROCEDURE — 73130 X-RAY EXAM OF HAND: CPT | Mod: LT | Performed by: RADIOLOGY

## 2023-03-03 PROCEDURE — 99214 OFFICE O/P EST MOD 30 MIN: CPT | Performed by: PODIATRIST
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
## 2023-03-06 ENCOUNTER — OFFICE VISIT (OUTPATIENT)
Dept: OPHTHALMOLOGY | Facility: CLINIC | Age: 78
End: 2023-03-06
Attending: OPHTHALMOLOGY
Payer: COMMERCIAL

## 2023-03-06 DIAGNOSIS — H43.812 PVD (POSTERIOR VITREOUS DETACHMENT), LEFT EYE: ICD-10-CM

## 2023-03-06 DIAGNOSIS — H43.811 PVD (POSTERIOR VITREOUS DETACHMENT), RIGHT: Primary | ICD-10-CM

## 2023-03-06 DIAGNOSIS — H35.363 DEGENERATIVE DRUSEN OF BOTH EYES: ICD-10-CM

## 2023-03-06 DIAGNOSIS — Z79.4 TYPE 2 DIABETES MELLITUS WITH BOTH EYES AFFECTED BY MILD NONPROLIFERATIVE RETINOPATHY WITHOUT MACULAR EDEMA, WITH LONG-TERM CURRENT USE OF INSULIN (H): ICD-10-CM

## 2023-03-06 DIAGNOSIS — E11.3293 TYPE 2 DIABETES MELLITUS WITH BOTH EYES AFFECTED BY MILD NONPROLIFERATIVE RETINOPATHY WITHOUT MACULAR EDEMA, WITH LONG-TERM CURRENT USE OF INSULIN (H): ICD-10-CM

## 2023-03-06 DIAGNOSIS — H35.373 EPIRETINAL MEMBRANE (ERM) OF BOTH EYES: ICD-10-CM

## 2023-03-06 PROCEDURE — 99214 OFFICE O/P EST MOD 30 MIN: CPT | Mod: GC | Performed by: OPHTHALMOLOGY

## 2023-03-06 PROCEDURE — G0463 HOSPITAL OUTPT CLINIC VISIT: HCPCS | Mod: 25

## 2023-03-06 PROCEDURE — 92134 CPTRZ OPH DX IMG PST SGM RTA: CPT | Performed by: OPHTHALMOLOGY

## 2023-03-06 PROCEDURE — 92015 DETERMINE REFRACTIVE STATE: CPT

## 2023-03-06 ASSESSMENT — CUP TO DISC RATIO
OS_RATIO: 0.35
OD_RATIO: 0.35

## 2023-03-06 ASSESSMENT — REFRACTION_MANIFEST
OD_SPHERE: PLANO
OD_ADD: +2.75
OS_SPHERE: PLANO
OS_AXIS: 170
OS_ADD: +2.75
OS_CYLINDER: +1.00

## 2023-03-06 ASSESSMENT — CONF VISUAL FIELD
OS_SUPERIOR_TEMPORAL_RESTRICTION: 0
OD_INFERIOR_NASAL_RESTRICTION: 0
OS_INFERIOR_TEMPORAL_RESTRICTION: 0
OS_SUPERIOR_NASAL_RESTRICTION: 0
OD_SUPERIOR_NASAL_RESTRICTION: 0
OS_INFERIOR_NASAL_RESTRICTION: 0
OD_INFERIOR_TEMPORAL_RESTRICTION: 0
OD_SUPERIOR_TEMPORAL_RESTRICTION: 0
OD_NORMAL: 1
OS_NORMAL: 1

## 2023-03-06 ASSESSMENT — VISUAL ACUITY
METHOD: SNELLEN - LINEAR
OD_SC: 20/20
OS_SC: 20/40

## 2023-03-06 ASSESSMENT — SLIT LAMP EXAM - LIDS
COMMENTS: NORMAL
COMMENTS: NORMAL

## 2023-03-06 ASSESSMENT — TONOMETRY
OD_IOP_MMHG: 14
OS_IOP_MMHG: 18
IOP_METHOD: TONOPEN

## 2023-03-06 ASSESSMENT — EXTERNAL EXAM - RIGHT EYE: OD_EXAM: NORMAL

## 2023-03-06 ASSESSMENT — EXTERNAL EXAM - LEFT EYE: OS_EXAM: NORMAL

## 2023-03-06 NOTE — NURSING NOTE
Chief Complaints and History of Present Illnesses   Patient presents with     Diabetic Retinopathy Follow Up     Chief Complaint(s) and History of Present Illness(es)     Diabetic Retinopathy Follow Up            Laterality: both eyes    Onset: 11 months ago          Comments    Pt. States that he is doing well. No change in VA BE. Would like an updated prescription for glasses today. Has been getting dizzy when looking left and right recently. No pain BE. No flashes or floaters BE.   Arlet Marquez COT 2:23 PM March 6, 2023

## 2023-03-06 NOTE — PROGRESS NOTES
I have confirmed the patient's and reviewed Past Medical History, Past Surgical History, Social History, Family History, Problem List, Medication List and agree with Tech note.    CC: follow up diabetic retinopathy    Interval: LCV 3/2022. States that he is doing well. No change in VA BE. Would like an updated prescription for glasses today. Has been getting dizzy when straining to look to right while standing - states he notices this off and on for a few months - not causing him any issues. Denies diplopia. No pain BE. No flashes or floaters BE. Has many readers and feels they are all the wrong prescription so wants a prescription here today.     HPI:   Amos Walker is a 77 year old man with POH of NPDR OU who presents for annual exam. History of DM2 x 25yrs, on insulin. 9/8/2022 A1c 6.3% - follows with PCP for diabetes. PMH includes CAD, CKD, emphysema, HTN, HLD, PAD, tobacco use.      Imaging:  OCT macula 03/06/23   OD: normal foveal contour, distinct retinal laminations, no subretinal deposits, no fluid, choroid thickness equal to parafoveal retinal thickness, ERM  OS: normal foveal contour, distinct retinal laminations, no subretinal deposits, no fluid, choroid thickness equal to parafoveal retinal thickness, ERM    ASSESSMENT/PLAN  1. T2DM w/ mild NPDR OU   - Blood pressure (<120/80) and blood glucose (HbA1c <7.0) control discussed with patient. Patient advised that failure to adequately control each may lead to vision loss. The patient expressed understanding.    2. Pseudophakia OU  - RD precautions    3. Drusen BE  - not AMD, mild    4. PVD both eyes   Retinal detachment/retinal tear precautions discussed with patient  Contact clinic immediately for new floaters/flashers or shadows in vision    5. Refractive error OU  - Presbyopia OU, astigmatism OS  - Discussed and dispensed MRX with BCVA 20/20 OU    6. Epiretinal membrane both eyes    - seen on Optical Coherence Tomography Scan     return to clinic: 12  months dilated fundus exam, V,T,D OCT macula OU     Tim Pantoja MD  Resident Physician - PGY3  Department of Ophthalmology   [facility]          Attestation:  I have seen and examined the patient with Dr. Pantoja and agree with the findings in this note, as well as the interpretations of the diagnostic tests.      Jen Andersen MD PhD.  Professor & Chair

## 2023-03-14 ENCOUNTER — MEDICAL CORRESPONDENCE (OUTPATIENT)
Dept: INTERNAL MEDICINE | Facility: CLINIC | Age: 78
End: 2023-03-14

## 2023-03-17 ENCOUNTER — OFFICE VISIT (OUTPATIENT)
Dept: PODIATRY | Facility: CLINIC | Age: 78
End: 2023-03-17
Payer: COMMERCIAL

## 2023-03-17 DIAGNOSIS — E11.51 DIABETES MELLITUS WITH PERIPHERAL VASCULAR DISEASE (H): Primary | ICD-10-CM

## 2023-03-17 DIAGNOSIS — E11.49 TYPE II OR UNSPECIFIED TYPE DIABETES MELLITUS WITH NEUROLOGICAL MANIFESTATIONS, NOT STATED AS UNCONTROLLED(250.60) (H): ICD-10-CM

## 2023-03-17 DIAGNOSIS — E11.610 CHARCOT FOOT DUE TO DIABETES MELLITUS (H): ICD-10-CM

## 2023-03-17 DIAGNOSIS — L97.322 SKIN ULCER OF LEFT ANKLE WITH FAT LAYER EXPOSED (H): ICD-10-CM

## 2023-03-17 DIAGNOSIS — I87.8 VENOUS STASIS: ICD-10-CM

## 2023-03-17 PROCEDURE — 99214 OFFICE O/P EST MOD 30 MIN: CPT | Performed by: PODIATRIST
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
## 2023-03-22 ENCOUNTER — OFFICE VISIT (OUTPATIENT)
Dept: AUDIOLOGY | Facility: CLINIC | Age: 78
End: 2023-03-22
Payer: COMMERCIAL

## 2023-03-22 DIAGNOSIS — H90.3 BILATERAL SENSORINEURAL HEARING LOSS: Primary | ICD-10-CM

## 2023-03-22 PROCEDURE — V5160 DISPENSING FEE BINAURAL: HCPCS

## 2023-03-22 PROCEDURE — V5011 HEARING AID FITTING/CHECKING: HCPCS | Mod: RT

## 2023-03-22 PROCEDURE — V5261 HEARING AID, DIGIT, BIN, BTE: HCPCS

## 2023-03-22 PROCEDURE — 92593 PR HEARING AID CHECK, BINAURAL: CPT

## 2023-03-22 PROCEDURE — V5020 CONFORMITY EVALUATION: HCPCS | Mod: RT

## 2023-03-22 PROCEDURE — V5264 EAR MOLD/INSERT: HCPCS | Mod: RT

## 2023-03-22 NOTE — PATIENT INSTRUCTIONS
Today you were fit with new hearing aids(s).Your hearing aids were adjusted according to your hearing loss, and the adjustments verified in your ear. The following are important points to remember:  Red - Right, Blue - Left  Open the battery door to turn off the hearing aids whenever you are not using them (i.e., when you go to sleep, bathe, swim, etc.).  Otherwise, the battery will drain and you will go through batteries faster.  Remember to clean your hearing aids every day after use.  Wax accumulation can decrease hearing aid performance, lead to frequent repairs, and reduce hearing aid durability.  Wear your hearing instruments as much as you can so that your brain learns to process the sound you will hear through them. This process is called acclimatization.    What to expect when using your hearing aids  In the early days or weeks, some sounds may seem too loud, too soft or not natural. This is normal. Notice when sounds seem wrong. Write down any issues or concerns. This will help your audiologist (hearing specialist)  tune  your hearing aid at your next visit.   Other things to note as you adjust to your new hearing aids:    Hearing aids will not restore your hearing to  normal  levels.    Hearing aids are designed to improve the ability to understand speech.    Hearing aids will not block background noise, but they may reduce its impact on speech.    Noisy situations that are hard for others may also be hard for you.    Sounds that are so loud they cause discomfort for others may do the same for you.    Normal loud sounds should not be uncomfortably loud.    Battery use and warnings    Before you replace an old battery, peel off the sticker of the new battery and set it out for 2 to 5 minutes.    Place the battery with the negative side (bump) into the cup and the positive side (flat) up.    Open the battery door when not using your hearing aid to save on battery life.    Warning:  o Batteries are  dangerous if swallowed; Never put batteries in your mouth.    o Keep out of reach of children and pets.  o Throw away batteries in the trash or through a recycling program.  o Never let children handle batteries.  o Do not keep batteries near medicine.  If someone swallows a battery, call the [employer] Hotline at 520-977-5107.    Return in 2-3 weeks for a hearing aid check appointment

## 2023-03-22 NOTE — PROGRESS NOTES
AUDIOLOGY REPORT    SUBJECTIVE: Amos Walker, a 77 year old male, was seen in the Audiology Clinic at [facility] today for a Binaural hearing aid fitting. Previous results have revealed a bilateral sensorineural hearing loss. Amos has worn binaural Phonak MOIRA hearing aids in the past     OBJECTIVE:  Prior to fitting, a hearing aid check was performed to ensure device functionality. The hearing aid conformity evaluation was completed.The hearing aids were placed and they provided a good fit. Real-ear-probe-microphone measurements were completed on the Pongr system and were a good match to NAL-NL2 target with soft sounds audible, moderate sounds comfortable, and loud sounds below discomfort. UCLs are verified through maximum power output measures and demonstrate appropriate limiting of loud inputs. Mr. Walker was oriented to proper hearing aid use, care, cleaning (no water, dry brush), batteries (size rechargeable, insertion/removal, toxicity, low-battery signal), aid insertion/removal, user booklet, warranty information, storage cases, and other hearing aid details. The patient confirmed understanding of hearing aid use and care, and showed proper insertion of hearing aid and batteries while in the office today. Mr. Walker reported good volume and sound quality today.    EAR(S) FIT: Binaural  HEARING AID MAKE: Right: Phonak; Left: Phonak    HEARING AID MODEL #: Right: L50-RT; Left: Audeo L50-RT  HEARING AID STYLE: Right: MOIRA; Left: MOIRA    LENGTH: Right:  3UP; Left:  3UP  EARMOLDS: Right: cshell acrylic w/canal lock SN: [id number]; Left:  cshell acrylic w/canal lock SN: [id number]  SERIAL NUMBERS: Right: [id number]; Left: [id number]  WARRANTY END DATE: Right: 6/4/2026; Left:: 6/4/2026      (The patient Does Not Require a signed a waiver that is on file agreeing to the upgrade charge, per their insurance contract. )     *Hearing aids were paired to patient's cellphone for both phone calls and  armida use. A practice call was done to ensure proper pairing and understanding       ASSESSMENT: Binaural hearing aid fitting completed today. Verification measures were performed. The 45 day trial period was explained to patient, and they expressed understanding. Mr. Walker signed the Hearing Aid Purchase Agreement and was given a copy, as well as details on his hearing aids. Patient was counseled that exact out of pocket amounts cannot be determined for hearing aid claims being sent to insurance. Any insurance coverage information presented to the patient is an estimate only, and is not a guarantee of payment. Patient has been advised to check with their own insurance.    PLAN: Mr. Walker will return for follow-up in 2-3 weeks for a hearing aid review appointment. Please call this clinic with questions regarding today s appointment.    Tierra Rivero, CCC-A  Licensed Audiologist  [place] #[id number]      03/22/23

## 2023-03-27 DIAGNOSIS — E11.40 TYPE 2 DIABETES, CONTROLLED, WITH NEUROPATHY (H): ICD-10-CM

## 2023-03-30 ENCOUNTER — TELEPHONE (OUTPATIENT)
Dept: INTERNAL MEDICINE | Facility: CLINIC | Age: 78
End: 2023-03-30
Payer: COMMERCIAL

## 2023-03-30 RX ORDER — DULAGLUTIDE 1.5 MG/.5ML
INJECTION, SOLUTION SUBCUTANEOUS
Qty: 6 ML | Refills: 0 | Status: SHIPPED | OUTPATIENT
Start: 2023-03-30 | End: 2023-04-19

## 2023-03-30 NOTE — TELEPHONE ENCOUNTER
LCV:2/3/2023  [facility]  NCV: 4-19-23  Overdue A1c, ( future order in epic) FYI to clinic  RF 90 day

## 2023-03-30 NOTE — TELEPHONE ENCOUNTER
Hgb A1c ordered, routed to clinical coordinators to assist pt with lab appt with next office visit.     TONY CHOWDARY RN on 3/30/2023 at 8:30 AM

## 2023-04-04 ENCOUNTER — LAB (OUTPATIENT)
Dept: LAB | Facility: CLINIC | Age: 78
End: 2023-04-04
Payer: COMMERCIAL

## 2023-04-04 DIAGNOSIS — E11.40 TYPE 2 DIABETES, CONTROLLED, WITH NEUROPATHY (H): ICD-10-CM

## 2023-04-04 LAB
ANION GAP SERPL CALCULATED.3IONS-SCNC: 4 MMOL/L (ref 3–14)
BUN SERPL-MCNC: 29 MG/DL (ref 7–30)
CALCIUM SERPL-MCNC: 9 MG/DL (ref 8.5–10.1)
CHLORIDE BLD-SCNC: 109 MMOL/L (ref 94–109)
CO2 SERPL-SCNC: 27 MMOL/L (ref 20–32)
CREAT SERPL-MCNC: 1.03 MG/DL (ref 0.66–1.25)
GFR SERPL CREATININE-BSD FRML MDRD: 76 ML/MIN/1.73M2
GLUCOSE BLD-MCNC: 147 MG/DL (ref 70–99)
HBA1C MFR BLD: 6.1 % (ref 0–5.6)
POTASSIUM BLD-SCNC: 4 MMOL/L (ref 3.4–5.3)
SODIUM SERPL-SCNC: 140 MMOL/L (ref 133–144)

## 2023-04-04 PROCEDURE — 36415 COLL VENOUS BLD VENIPUNCTURE: CPT

## 2023-04-04 PROCEDURE — 83036 HEMOGLOBIN GLYCOSYLATED A1C: CPT

## 2023-04-04 PROCEDURE — 80048 BASIC METABOLIC PNL TOTAL CA: CPT

## 2023-04-05 ENCOUNTER — OFFICE VISIT (OUTPATIENT)
Dept: PODIATRY | Facility: CLINIC | Age: 78
End: 2023-04-05
Payer: COMMERCIAL

## 2023-04-05 ENCOUNTER — OFFICE VISIT (OUTPATIENT)
Dept: AUDIOLOGY | Facility: CLINIC | Age: 78
End: 2023-04-05
Payer: COMMERCIAL

## 2023-04-05 DIAGNOSIS — E11.51 DIABETES MELLITUS WITH PERIPHERAL VASCULAR DISEASE (H): Primary | ICD-10-CM

## 2023-04-05 DIAGNOSIS — H90.3 BILATERAL SENSORINEURAL HEARING LOSS: Primary | ICD-10-CM

## 2023-04-05 DIAGNOSIS — B35.1 ONYCHOMYCOSIS: ICD-10-CM

## 2023-04-05 DIAGNOSIS — I87.8 VENOUS STASIS: ICD-10-CM

## 2023-04-05 DIAGNOSIS — E11.49 TYPE II OR UNSPECIFIED TYPE DIABETES MELLITUS WITH NEUROLOGICAL MANIFESTATIONS, NOT STATED AS UNCONTROLLED(250.60) (H): ICD-10-CM

## 2023-04-05 DIAGNOSIS — E11.610 CHARCOT FOOT DUE TO DIABETES MELLITUS (H): ICD-10-CM

## 2023-04-05 DIAGNOSIS — L84 TYLOMA: ICD-10-CM

## 2023-04-05 DIAGNOSIS — L97.521 SKIN ULCER OF LEFT FOOT, LIMITED TO BREAKDOWN OF SKIN (H): ICD-10-CM

## 2023-04-05 PROCEDURE — V5299 HEARING SERVICE: HCPCS

## 2023-04-05 PROCEDURE — 11720 DEBRIDE NAIL 1-5: CPT | Mod: XS | Performed by: PODIATRIST

## 2023-04-05 PROCEDURE — 11056 PARNG/CUTG B9 HYPRKR LES 2-4: CPT | Performed by: PODIATRIST

## 2023-04-05 PROCEDURE — 99214 OFFICE O/P EST MOD 30 MIN: CPT | Mod: 25 | Performed by: PODIATRIST
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
## 2023-04-05 NOTE — PROGRESS NOTES
AUDIOLOGY REPORT    SUBJECTIVE:Amos Walker is a 77 year old male who was seen in the Audiology Clinic at the [facility] on 4/5/2023  for a follow-up check regarding the fitting of new hearing aids. Previous results have revealed a bilateral sensorineural hearing loss.  The patient has been seen previously in this clinic and was fit with Phonak Audeo L50-RT MOIRA hearing on 3/22/2023.  Amos reports good sound quality with the hearing aid(s).     OBJECTIVE:   The International Outcome Inventory-Hearing Aids (IOI-HA) was administered today.The patient s responses to the 7 questions can be compared to normative data relative to how others are performing with their hearing aids, as well as focusing audiologic care and counseling.This patient s Quality of Life score (Question 7) was 5, which is above normative average.     Reviewed 45 day trial period, care, cleaning (no water, dry brush), batteries (size rechargeable) insertion/removal, toxicity, low-battery signal), aid insertion/removal, volume adjustment (if applicable), user booklet, warranty information, storage cases, and other hearing aid details.     No charge visit today (in warranty hearing aid check).     ASSESSMENT: A follow-up appointment for hearing aid fitting was completed today. IOI-HA administered today. Changes to hearing aid was completed as outlined above.     PLAN:Amos will return for follow-up as needed, or at least every 9-12 months for cleaning and assessment of hearing aid.  Please call this clinic with any questions regarding today s appointment.    Tierra Rivero, CCC-A  Licensed Audiologist  [place] #[id number]      04/05/23

## 2023-04-05 NOTE — NURSING NOTE
Amos Walker's chief complaint for this visit includes:  Chief Complaint   Patient presents with     RECHECK     Ulcer follow up      PCP: Racheal Swift    Referring Provider:  No referring provider defined for this encounter.    There were no vitals taken for this visit.  Data Unavailable     Do you need any medication refills at today's visit? NO    Allergies   Allergen Reactions     Seasonal Allergies      Simvastatin Other (See Comments)     Sun sensitivty     Lisinopril Dizziness     Sun sensitive     Methylisothiazolinone Rash     Neomycin      Wound gets worse     Povidone Iodine Rash       Paola Laws, ATC

## 2023-04-06 DIAGNOSIS — E11.51 DIABETES MELLITUS WITH PERIPHERAL VASCULAR DISEASE (H): ICD-10-CM

## 2023-04-07 ENCOUNTER — MYC MEDICAL ADVICE (OUTPATIENT)
Dept: INTERNAL MEDICINE | Facility: CLINIC | Age: 78
End: 2023-04-07
Payer: COMMERCIAL

## 2023-04-07 DIAGNOSIS — E11.40 TYPE 2 DIABETES, CONTROLLED, WITH NEUROPATHY (H): Primary | ICD-10-CM

## 2023-04-07 RX ORDER — INSULIN LISPRO 100 [IU]/ML
3-4 INJECTION, SOLUTION INTRAVENOUS; SUBCUTANEOUS
Qty: 15 ML | Refills: 3 | Status: ON HOLD | OUTPATIENT
Start: 2023-04-07 | End: 2023-07-03

## 2023-04-07 RX ORDER — INSULIN LISPRO 100 [IU]/ML
INJECTION, SOLUTION INTRAVENOUS; SUBCUTANEOUS
Qty: 15 ML | Refills: 1 | OUTPATIENT
Start: 2023-04-07

## 2023-04-18 ENCOUNTER — OFFICE VISIT (OUTPATIENT)
Dept: PODIATRY | Facility: CLINIC | Age: 78
End: 2023-04-18
Payer: COMMERCIAL

## 2023-04-18 DIAGNOSIS — I87.8 VENOUS STASIS: ICD-10-CM

## 2023-04-18 DIAGNOSIS — L97.521 SKIN ULCER OF LEFT FOOT, LIMITED TO BREAKDOWN OF SKIN (H): ICD-10-CM

## 2023-04-18 DIAGNOSIS — E11.51 DIABETES MELLITUS WITH PERIPHERAL VASCULAR DISEASE (H): Primary | ICD-10-CM

## 2023-04-18 DIAGNOSIS — E11.610 CHARCOT FOOT DUE TO DIABETES MELLITUS (H): ICD-10-CM

## 2023-04-18 DIAGNOSIS — E11.49 TYPE II OR UNSPECIFIED TYPE DIABETES MELLITUS WITH NEUROLOGICAL MANIFESTATIONS, NOT STATED AS UNCONTROLLED(250.60) (H): ICD-10-CM

## 2023-04-18 PROCEDURE — 99214 OFFICE O/P EST MOD 30 MIN: CPT | Performed by: PODIATRIST
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
## 2023-04-19 ENCOUNTER — OFFICE VISIT (OUTPATIENT)
Dept: INTERNAL MEDICINE | Facility: CLINIC | Age: 78
End: 2023-04-19
Payer: COMMERCIAL

## 2023-04-19 VITALS
BODY MASS INDEX: 21.52 KG/M2 | DIASTOLIC BLOOD PRESSURE: 68 MMHG | HEIGHT: 74 IN | WEIGHT: 167.7 LBS | OXYGEN SATURATION: 100 % | SYSTOLIC BLOOD PRESSURE: 145 MMHG | HEART RATE: 94 BPM

## 2023-04-19 DIAGNOSIS — Z79.4 TYPE 2 DIABETES MELLITUS WITH DIABETIC PERIPHERAL ANGIOPATHY WITHOUT GANGRENE, WITH LONG-TERM CURRENT USE OF INSULIN (H): ICD-10-CM

## 2023-04-19 DIAGNOSIS — I10 ESSENTIAL HYPERTENSION: ICD-10-CM

## 2023-04-19 DIAGNOSIS — R26.89 BALANCE PROBLEMS: ICD-10-CM

## 2023-04-19 DIAGNOSIS — E11.40 TYPE 2 DIABETES, CONTROLLED, WITH NEUROPATHY (H): Primary | ICD-10-CM

## 2023-04-19 DIAGNOSIS — E11.51 TYPE 2 DIABETES MELLITUS WITH DIABETIC PERIPHERAL ANGIOPATHY WITHOUT GANGRENE, WITH LONG-TERM CURRENT USE OF INSULIN (H): ICD-10-CM

## 2023-04-19 DIAGNOSIS — G62.9 NEUROPATHY: ICD-10-CM

## 2023-04-19 DIAGNOSIS — R53.81 PHYSICAL DECONDITIONING: ICD-10-CM

## 2023-04-19 DIAGNOSIS — I73.9 PERIPHERAL VASCULAR DISEASE, UNSPECIFIED (H): ICD-10-CM

## 2023-04-19 PROCEDURE — 99214 OFFICE O/P EST MOD 30 MIN: CPT | Performed by: INTERNAL MEDICINE

## 2023-04-19 PROCEDURE — 99207 PR FOOT EXAM NO CHARGE: CPT | Performed by: INTERNAL MEDICINE

## 2023-04-19 RX ORDER — DULAGLUTIDE 1.5 MG/.5ML
1.5 INJECTION, SOLUTION SUBCUTANEOUS
Qty: 6 ML | Refills: 3 | Status: SHIPPED | OUTPATIENT
Start: 2023-04-19 | End: 2024-04-26

## 2023-04-19 RX ORDER — LISINOPRIL 5 MG/1
2.5 TABLET ORAL DAILY
Qty: 180 TABLET | Refills: 1 | COMMUNITY
Start: 2023-04-19 | End: 2023-08-17

## 2023-04-19 RX ORDER — CLOPIDOGREL BISULFATE 75 MG/1
75 TABLET ORAL DAILY
Qty: 90 TABLET | Refills: 3 | Status: SHIPPED | OUTPATIENT
Start: 2023-04-19 | End: 2024-05-31

## 2023-04-19 RX ORDER — FLASH GLUCOSE SENSOR
KIT MISCELLANEOUS
Qty: 6 EACH | Refills: 3 | Status: SHIPPED | OUTPATIENT
Start: 2023-04-19 | End: 2024-03-01

## 2023-04-19 NOTE — PROGRESS NOTES
History of Present Illness:  Mr. Walker is a 77 year old adult who presents for  Chief Complaint   Patient presents with     Follow Up     Pt here for 2 month follow up and diabetic foot exam for diabetic shoes/inserts.     PMH involving tobacco use, DM, HTN, CAD, PAD, Right Charcot foot, diabetic neuropathy, emphysema, tobacco use, anemia of chronic disease, MGUS.    Amos has seen ENT for hearing loss, cerumen, obstructed hearing, otitis externa. Infection is resolved. He got new hearing aids which helped.    His last A1c was 6.1.  He reports some occasional lows, compensates by drinking juice.  He states he miscalculates insulin dose. Vision changes happen. CGM data reviewed. 8 lows in last 30 days. Overall, mostly 100-160 katarina.  80% in goal range.    Continues to follow with podiatry for shin wound but pretty much healed.  His did have repeat ABIs which showed improvement.  He attributes to walking a lot during his move. He feels clumsy when walking.  Feels weak.  He is not exercising. NO aching/pain when walking.  Dr. Chappell wrote for diabetic shoes.    Tremors with writing only, wants to keep an eye on it. Defers neurology visit.    BP is stable at home, systolic <130  Taking 10 mg of lisinopril.    He sometimes forgets to take alendronate.      Routine Health Maintenance  Immunizations:   Most Recent Immunizations   Administered Date(s) Administered     COVID-19,PF,Pfizer 05/06/2021     Influenza (High Dose) 3 valent vaccine 10/04/2019     Influenza (IIV3) PF 11/01/2013     Influenza, Quad, High Dose, Pf, 65yr + 10/05/2020     Pneumo Conj 13-V (2010&after) 11/03/2014     Pneumococcal 23 valent 12/21/2015     TDAP Vaccine (Boostrix) 03/21/2016      Lipids:   Recent Labs   Lab Test 09/08/22  1645 08/16/21  1224 12/17/15  0942 11/18/14  0853   CHOL 124 109   < > 110   HDL 49 60   < > 45   LDL 49 33   < > 45   TRIG 129 80   < > 100   CHOLHDLRATIO  --   --   --  2.4    < > = values in this interval not  displayed.      PSA (50-75 yrs): No results found for: PSA  DEXA: 7/21 FRAX elevated, multiple fractures, qualifies for treatment, started alendronate 5/22  AAA Screening (65-75 yrs): CT 12/17, negative  Lung Ca Screening (>30 py 55-79 or >20 py 50-79 + RF): 7/20, 7/21, 7/22, due 7/23  Colonoscopy (50-75 yrs): due 6/24, 6/19 Impression:          - The examined portion of the ileum was normal.                        - One 5 mm polyp in the cecum, removed with a cold snare                        and removed using injection-lift and a cold snare.                        Resected and retrieved.                        - One 6 mm polyp in the transverse colon, removed with a                        hot snare and removed using injection-lift and a hot                        snare. Resected and retrieved. Clip was placed.                        - One 5 mm polyp in the sigmoid colon, removed with a                        hot snare. Resected and retrieved.                        - The examination was otherwise normal on direct and                        retroflexion views.                        NOTE: the polyp reported as being in the descending                        colon in the prior colonoscopy report appears on images                        in the transverse colon; that is where we removed a                        likely SSA. The descending colon was inspected on                        multiple occasions and no polyps were identified.   Recommendation:      - Return to referring physician.                        - Repeat colonoscopy in 5 years for surveillance.                                                                           HIV/HCV if risk factors: nonreactive HepC 6/16  Safety/Lifestyle: reviewed  Tob/EtOH: declines quitting  Depression:   PHQ-2 Score:     PHQ-2 ( 1999 Pfizer) 1/5/2021 5/18/2020   Q1: Little interest or pleasure in doing things 0 0   Q2: Feeling down, depressed or hopeless 1 1   PHQ-2 Score 1 1            Review of external notes as documented above                   A detailed Review of Systems was performed, verified and is negative except as documented in the HPI.  All health questionnaires were reviewed, verified and relevant information documented above.      Past Medical History:  Past Medical History:   Diagnosis Date     Anemia      CAD (coronary artery disease)     2V CAD involving LAD and RCA, s/p DESx4 in 3/18     CKD (chronic kidney disease) stage 3, GFR 30-59 ml/min (H)      Colon polyp      Diabetic Charcot foot (H)      Emphysema of lung (H)     noted on CT     Heart disease      HTN (hypertension)      Hyperlipidemia      MRSA cellulitis of right foot     in past.      Osteopenia of both hips      PAD (peripheral artery disease) (H) 09/2018    s/p R femoral enarterectomy and stenting      Tobacco use     50+ pack     Type 2 diabetes mellitus (H)     for 25 yrs.  on insulin and starlix     Venous ulcer (H)        Active Meds:  Current Outpatient Medications   Medication     clopidogrel (PLAVIX) 75 MG tablet     Continuous Blood Gluc Sensor (FREESTYLE LATOYA 14 DAY SENSOR) MISC     dulaglutide (TRULICITY) 1.5 MG/0.5ML pen     lisinopril (ZESTRIL) 5 MG tablet     acetaminophen (TYLENOL) 325 MG tablet     alendronate (FOSAMAX) 70 MG tablet     ammonium lactate (LAC-HYDRIN) 12 % external cream     ascorbic acid 500 MG TABS     aspirin 81 MG EC tablet     calcium carbonate (OS-CLAUDIA) 500 MG tablet     cetirizine (ZYRTEC) 10 MG tablet     Continuous Blood Gluc  (FREESTYLE LATOYA 14 DAY READER) TANIA     ferrous sulfate (FEROSUL) 325 (65 Fe) MG tablet     gentamicin (GARAMYCIN) 0.1 % external cream     insulin lispro (HUMALOG KWIKPEN) 100 UNIT/ML (1 unit dial) KWIKPEN     insulin pen needle (B-D U/F) 31G X 8 MM miscellaneous     ketoconazole (NIZORAL) 2 % external shampoo     silver sulfADIAZINE (SSD) 1 % external cream     simvastatin (ZOCOR) 40 MG tablet     triamcinolone (KENALOG) 0.1 %  "external cream     triamcinolone (KENALOG) 0.1 % external cream     VITAMIN D, CHOLECALCIFEROL, PO     No current facility-administered medications for this visit.        Allergies:  Reviewed, refer to EMR    Relevant Social History:  Social History     Tobacco Use     Smoking status: Every Day     Packs/day: 0.25     Years: 50.00     Pack years: 12.50     Types: Cigarettes     Smokeless tobacco: Never   Substance Use Topics     Alcohol use: No     Drug use: No        Physical Exam:  Vitals: BP (!) 145/68 (BP Location: Right arm, Patient Position: Sitting, Cuff Size: Adult Regular)   Pulse 94   Ht 1.892 m (6' 2.49\")   Wt 76.1 kg (167 lb 11.2 oz)   SpO2 100%   BMI 21.25 kg/m    Constitutional: Alert, oriented, pleasant, no acute distress  Head: Normocephalic, atraumatic  Eyes: Extra-ocular movements intact, pupils equally round bilaterally, no scleral icterus  ENT: Oropharynx clear, moist mucus membranes  Neck: Supple, no lymphadenopathy  Cardiovascular: Regular rate and rhythm, no murmurs, rubs or gallops, peripheral pulses full/symmetric  Respiratory: Good air movement bilaterally, lungs clear, no wheezes/rales/rhonchi  Musculoskeletal: No edema, normal muscle tone, wearing R ankle brace  Neurologic: Alert and oriented, cranial nerves 2-12 intact, grossly non-focal  Psychiatric: normal mentation, affect and mood      Diagnostics:  Labs reviewed in Epic          Assessment and Plan:  Amos was seen today for follow up.    Diagnoses and all orders for this visit:    Type 2 diabetes, controlled, with neuropathy (H)   Cont trulicity, insulin, ASA, statin, ACEi  -     FOOT EXAM--> see notes from podiatry yesterday, decreased sensation bilat,k no open ulcer, decreased pulses bilat, hyperpigmented skin changes  -     dulaglutide (TRULICITY) 1.5 MG/0.5ML pen; Inject 1.5 mg Subcutaneous every 7 days    Essential hypertension  Cont 10 mg lisinopril    Balance problems  Encouraged Amos to try PT for gait, balance, " neuropathy, deconditioning.  -     Physical Therapy Referral; Future    Neuropathy  -     Physical Therapy Referral; Future  -     FOOT EXAM    Physical deconditioning  -     Physical Therapy Referral; Future    Type 2 diabetes mellitus with diabetic peripheral angiopathy without gangrene, with long-term current use of insulin (H)  -     Continuous Blood Gluc Sensor (FREESTYLE LATOYA 14 DAY SENSOR) MISC; REPLACE SENSOR EVERY 14 DAYS    Peripheral vascular disease, unspecified (H)  -     clopidogrel (PLAVIX) 75 MG tablet; Take 1 tablet (75 mg) by mouth daily        Racheal Swift MD  Internal Medicine    >30 minutes spent today performing chart review, history and exam, counseling, care coordination, documentation and further activities as noted above exclusive of any procedures or EKG interpretation

## 2023-04-19 NOTE — NURSING NOTE
"Amos Walker is a 77 year old male patient that presents today in clinic for the following:    Chief Complaint   Patient presents with     Follow Up     Pt here for 2 month follow up and diabetic foot exam for diabetic shoes/inserts.     The patient's allergies and medications were reviewed as noted. A set of vitals were recorded as noted without incident: BP (!) 145/68 (BP Location: Right arm, Patient Position: Sitting, Cuff Size: Adult Regular)   Pulse 94   Ht 1.892 m (6' 2.49\")   Wt 76.1 kg (167 lb 11.2 oz)   SpO2 100%   BMI 21.25 kg/m  . The patient does not have any other questions for the provider.    Morena Scott, EMT at 11:42 AM on 4/19/2023  "

## 2023-05-09 ENCOUNTER — HOSPITAL ENCOUNTER (OUTPATIENT)
Dept: PHYSICAL THERAPY | Facility: CLINIC | Age: 78
Setting detail: THERAPIES SERIES
Discharge: HOME | End: 2023-05-09
Attending: INTERNAL MEDICINE
Payer: COMMERCIAL

## 2023-05-09 ENCOUNTER — OFFICE VISIT (OUTPATIENT)
Dept: PODIATRY | Facility: CLINIC | Age: 78
End: 2023-05-09
Payer: COMMERCIAL

## 2023-05-09 DIAGNOSIS — E11.49 TYPE II OR UNSPECIFIED TYPE DIABETES MELLITUS WITH NEUROLOGICAL MANIFESTATIONS, NOT STATED AS UNCONTROLLED(250.60) (H): ICD-10-CM

## 2023-05-09 DIAGNOSIS — L97.521 SKIN ULCER OF LEFT FOOT, LIMITED TO BREAKDOWN OF SKIN (H): ICD-10-CM

## 2023-05-09 DIAGNOSIS — E11.51 DIABETES MELLITUS WITH PERIPHERAL VASCULAR DISEASE (H): Primary | ICD-10-CM

## 2023-05-09 DIAGNOSIS — I87.8 VENOUS STASIS: ICD-10-CM

## 2023-05-09 DIAGNOSIS — E11.610 CHARCOT FOOT DUE TO DIABETES MELLITUS (H): ICD-10-CM

## 2023-05-09 DIAGNOSIS — R29.898 HAND WEAKNESS: Primary | ICD-10-CM

## 2023-05-09 DIAGNOSIS — R25.1 TREMOR OF RIGHT HAND: ICD-10-CM

## 2023-05-09 PROCEDURE — 97110 THERAPEUTIC EXERCISES: CPT | Mod: GP | Performed by: PHYSICAL THERAPIST

## 2023-05-09 PROCEDURE — 99214 OFFICE O/P EST MOD 30 MIN: CPT | Performed by: PODIATRIST

## 2023-05-09 PROCEDURE — 97161 PT EVAL LOW COMPLEX 20 MIN: CPT | Mod: GP | Performed by: PHYSICAL THERAPIST

## 2023-05-09 NOTE — NURSING NOTE
Amos Walker's chief complaint for this visit includes:  Chief Complaint   Patient presents with     Follow Up     Bilateral feet ulcers     PCP: Racheal Swift    Referring Provider:  No referring provider defined for this encounter.    There were no vitals taken for this visit.  Data Unavailable        Allergies   Allergen Reactions     No Clinical Screening - See Comments      methylisothiazolinone     Seasonal Allergies      Simvastatin Other (See Comments)     Sun sensitivty     Lisinopril Dizziness     Sun sensitive     Methylisothiazolinone Rash     Neomycin      Wound gets worse     Povidone Iodine Rash         Do you need any medication refills at today's visit?
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
## 2023-05-09 NOTE — PROGRESS NOTES
[facility]                                                                                   OUTPATIENT PHYSICAL THERAPY FUNCTIONAL EVALUATION  PLAN OF TREATMENT FOR OUTPATIENT REHABILITATION  (COMPLETE FOR INITIAL CLAIMS ONLY)  Patient's Last Name, First Name, M.I.  YOB: 1945  Amos Walker     Provider's Name   [facility]   Medical Record No.  [id number]     Start of Care Date:  05/09/23   Onset Date:  04/19/23   Type:     _X__PT   ____OT  ____SLP Medical Diagnosis:  balance problems, neuropathy, physical deconditioning     PT Diagnosis:  balance impairments and weakness Visits from SOC:  1                              __________________________________________________________________________________  Plan of Treatment/Functional Goals:  IADL retraining, balance training, gait training, neuromuscular re-education, ROM, strengthening, stretching, transfer training           GOALS  TUG  Amos will improve score on TUG from 15 secs to 11 or < in order to show decreased risk of falls at home and in the community.  07/07/23    sit to stand  Amos will perform 10 sit to stands from chair with UE support and improved stability onces in standing to decrease risk of falls with transfers and show improved strength.  07/07/23    walking  Amos will complete 6MWT with cane at speed of .8 m/s or > with only mild fatigue in order to increase his stamina and ambulation distance in the community.  07/07/23             Therapy Frequency:  1 time/week   Predicted Duration of Therapy Intervention:  up to 6-8 weeks    Jessie Tipton, PT                                    I CERTIFY THE NEED FOR THESE SERVICES FURNISHED UNDER        THIS PLAN OF TREATMENT AND WHILE UNDER MY CARE     (Physician co-signature of this document indicates review and certification of the therapy plan).                 Certification Date From:  05/09/23   Certification Date To:  08/06/23    Referring Provider:  Racheal Swift MD    Initial Assessment  See Epic Evaluation- Start of Care Date: 05/09/23
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
## 2023-05-22 ENCOUNTER — MYC MEDICAL ADVICE (OUTPATIENT)
Dept: INTERNAL MEDICINE | Facility: CLINIC | Age: 78
End: 2023-05-22
Payer: COMMERCIAL

## 2023-05-26 ENCOUNTER — THERAPY VISIT (OUTPATIENT)
Dept: OCCUPATIONAL THERAPY | Facility: CLINIC | Age: 78
End: 2023-05-26
Attending: INTERNAL MEDICINE
Payer: COMMERCIAL

## 2023-05-26 DIAGNOSIS — R25.1 TREMOR OF RIGHT HAND: ICD-10-CM

## 2023-05-26 DIAGNOSIS — R29.898 HAND WEAKNESS: ICD-10-CM

## 2023-05-26 PROCEDURE — 97760 ORTHOTIC MGMT&TRAING 1ST ENC: CPT | Mod: GO

## 2023-05-26 PROCEDURE — 97165 OT EVAL LOW COMPLEX 30 MIN: CPT | Mod: GO

## 2023-05-26 PROCEDURE — 97530 THERAPEUTIC ACTIVITIES: CPT | Mod: GO
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
## 2023-05-30 ENCOUNTER — OFFICE VISIT (OUTPATIENT)
Dept: PODIATRY | Facility: CLINIC | Age: 78
End: 2023-05-30
Payer: COMMERCIAL

## 2023-05-30 DIAGNOSIS — E11.49 TYPE II OR UNSPECIFIED TYPE DIABETES MELLITUS WITH NEUROLOGICAL MANIFESTATIONS, NOT STATED AS UNCONTROLLED(250.60) (H): ICD-10-CM

## 2023-05-30 DIAGNOSIS — L97.521 SKIN ULCER OF LEFT FOOT, LIMITED TO BREAKDOWN OF SKIN (H): ICD-10-CM

## 2023-05-30 DIAGNOSIS — E11.51 DIABETES MELLITUS WITH PERIPHERAL VASCULAR DISEASE (H): Primary | ICD-10-CM

## 2023-05-30 DIAGNOSIS — E11.610 CHARCOT FOOT DUE TO DIABETES MELLITUS (H): ICD-10-CM

## 2023-05-30 DIAGNOSIS — I87.8 VENOUS STASIS: ICD-10-CM

## 2023-05-30 PROCEDURE — 99214 OFFICE O/P EST MOD 30 MIN: CPT | Performed by: PODIATRIST
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
## 2023-06-01 ENCOUNTER — HEALTH MAINTENANCE LETTER (OUTPATIENT)
Age: 78
End: 2023-06-01

## 2023-06-09 ENCOUNTER — THERAPY VISIT (OUTPATIENT)
Dept: PHYSICAL THERAPY | Facility: CLINIC | Age: 78
End: 2023-06-09
Attending: INTERNAL MEDICINE
Payer: COMMERCIAL

## 2023-06-09 ENCOUNTER — THERAPY VISIT (OUTPATIENT)
Dept: OCCUPATIONAL THERAPY | Facility: CLINIC | Age: 78
End: 2023-06-09
Attending: INTERNAL MEDICINE
Payer: COMMERCIAL

## 2023-06-09 DIAGNOSIS — R29.898 HAND WEAKNESS: Primary | ICD-10-CM

## 2023-06-09 DIAGNOSIS — R26.89 BALANCE PROBLEMS: Primary | ICD-10-CM

## 2023-06-09 DIAGNOSIS — R25.1 TREMOR OF RIGHT HAND: ICD-10-CM

## 2023-06-09 PROCEDURE — 97530 THERAPEUTIC ACTIVITIES: CPT | Mod: GO

## 2023-06-09 PROCEDURE — 97763 ORTHC/PROSTC MGMT SBSQ ENC: CPT | Mod: GO

## 2023-06-09 PROCEDURE — 97116 GAIT TRAINING THERAPY: CPT | Mod: GP | Performed by: PHYSICAL THERAPIST

## 2023-06-09 PROCEDURE — 97110 THERAPEUTIC EXERCISES: CPT | Mod: GP | Performed by: PHYSICAL THERAPIST

## 2023-06-20 ENCOUNTER — OFFICE VISIT (OUTPATIENT)
Dept: PODIATRY | Facility: CLINIC | Age: 78
End: 2023-06-20
Payer: COMMERCIAL

## 2023-06-20 DIAGNOSIS — E11.49 TYPE II OR UNSPECIFIED TYPE DIABETES MELLITUS WITH NEUROLOGICAL MANIFESTATIONS, NOT STATED AS UNCONTROLLED(250.60) (H): ICD-10-CM

## 2023-06-20 DIAGNOSIS — B35.1 ONYCHOMYCOSIS: ICD-10-CM

## 2023-06-20 DIAGNOSIS — E11.51 DIABETES MELLITUS WITH PERIPHERAL VASCULAR DISEASE (H): Primary | ICD-10-CM

## 2023-06-20 DIAGNOSIS — I87.8 VENOUS STASIS: ICD-10-CM

## 2023-06-20 DIAGNOSIS — E11.610 CHARCOT FOOT DUE TO DIABETES MELLITUS (H): ICD-10-CM

## 2023-06-20 DIAGNOSIS — L97.321 SKIN ULCER OF LEFT ANKLE, LIMITED TO BREAKDOWN OF SKIN (H): ICD-10-CM

## 2023-06-20 DIAGNOSIS — L84 TYLOMA: ICD-10-CM

## 2023-06-20 DIAGNOSIS — L97.521 SKIN ULCER OF LEFT FOOT, LIMITED TO BREAKDOWN OF SKIN (H): ICD-10-CM

## 2023-06-20 PROCEDURE — 99214 OFFICE O/P EST MOD 30 MIN: CPT | Performed by: PODIATRIST

## 2023-06-20 ASSESSMENT — PAIN SCALES - GENERAL: PAINLEVEL: NO PAIN (0)

## 2023-06-20 NOTE — NURSING NOTE
Amos Walker's chief complaint for this visit includes:  Chief Complaint   Patient presents with     Left Foot - WOUND CARE, Follow Up     Right Foot - WOUND CARE, Follow Up     PCP: Racheal Swift    Referring Provider:  No referring provider defined for this encounter.    There were no vitals taken for this visit.  No Pain (0)     Do you need any medication refills at today's visit? NO    Allergies   Allergen Reactions     No Clinical Screening - See Comments      methylisothiazolinone     Seasonal Allergies      Simvastatin Other (See Comments)     Sun sensitivty     Lisinopril Dizziness     Sun sensitive     Methylisothiazolinone Rash     Neomycin      Wound gets worse     Povidone Iodine Rash       Conrado Ashby, EMT
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
## 2023-06-26 NOTE — NURSING NOTE
"Amos Walker's chief complaint for this visit includes:  Chief Complaint   Patient presents with     Right Foot - RECHECK     Wound check     PCP: Racheal Swift    Referring Provider:  No referring provider defined for this encounter.    /67  Pulse 91  Ht 1.88 m (6' 2\")  Wt 76.7 kg (169 lb)  SpO2 99%  BMI 21.7 kg/m2  Data Unavailable     Do you need any medication refills at today's visit? No    " No - the patient is unable to be screened due to medical condition

## 2023-06-29 ENCOUNTER — APPOINTMENT (OUTPATIENT)
Dept: GENERAL RADIOLOGY | Facility: CLINIC | Age: 78
DRG: 522 | End: 2023-06-29
Attending: EMERGENCY MEDICINE
Payer: COMMERCIAL

## 2023-06-29 ENCOUNTER — APPOINTMENT (OUTPATIENT)
Dept: GENERAL RADIOLOGY | Facility: CLINIC | Age: 78
DRG: 522 | End: 2023-06-29
Payer: COMMERCIAL

## 2023-06-29 ENCOUNTER — HOSPITAL ENCOUNTER (INPATIENT)
Facility: CLINIC | Age: 78
LOS: 11 days | Discharge: NURSING, REHAB OR LONG-TERM CARE | DRG: 522 | End: 2023-07-10
Attending: EMERGENCY MEDICINE | Admitting: PEDIATRICS
Payer: COMMERCIAL

## 2023-06-29 DIAGNOSIS — E11.49 TYPE II OR UNSPECIFIED TYPE DIABETES MELLITUS WITH NEUROLOGICAL MANIFESTATIONS, NOT STATED AS UNCONTROLLED(250.60) (H): ICD-10-CM

## 2023-06-29 DIAGNOSIS — S72.144A CLOSED NONDISPLACED INTERTROCHANTERIC FRACTURE OF RIGHT FEMUR, INITIAL ENCOUNTER (H): ICD-10-CM

## 2023-06-29 DIAGNOSIS — I73.9 PAD (PERIPHERAL ARTERY DISEASE) (H): ICD-10-CM

## 2023-06-29 DIAGNOSIS — S72.001A CLOSED DISPLACED FRACTURE OF RIGHT FEMORAL NECK (H): Primary | ICD-10-CM

## 2023-06-29 LAB
ABO/RH(D): NORMAL
ALBUMIN SERPL BCG-MCNC: 3.7 G/DL (ref 3.5–5.2)
ALP SERPL-CCNC: 96 U/L (ref 40–129)
ALT SERPL W P-5'-P-CCNC: 7 U/L (ref 0–70)
ANION GAP SERPL CALCULATED.3IONS-SCNC: 11 MMOL/L (ref 7–15)
ANTIBODY SCREEN: NEGATIVE
AST SERPL W P-5'-P-CCNC: 23 U/L (ref 0–45)
ATRIAL RATE - MUSE: 95 BPM
BASOPHILS # BLD AUTO: 0 10E3/UL (ref 0–0.2)
BASOPHILS NFR BLD AUTO: 0 %
BILIRUB SERPL-MCNC: 0.8 MG/DL
BUN SERPL-MCNC: 25.6 MG/DL (ref 8–23)
CALCIUM SERPL-MCNC: 8.4 MG/DL (ref 8.8–10.2)
CHLORIDE SERPL-SCNC: 97 MMOL/L (ref 98–107)
CREAT SERPL-MCNC: 1.1 MG/DL (ref 0.67–1.17)
DEPRECATED HCO3 PLAS-SCNC: 22 MMOL/L (ref 22–29)
DIASTOLIC BLOOD PRESSURE - MUSE: NORMAL MMHG
EOSINOPHIL # BLD AUTO: 0.2 10E3/UL (ref 0–0.7)
EOSINOPHIL NFR BLD AUTO: 2 %
ERYTHROCYTE [DISTWIDTH] IN BLOOD BY AUTOMATED COUNT: 12.6 % (ref 10–15)
GFR SERPL CREATININE-BSD FRML MDRD: 70 ML/MIN/1.73M2
GLUCOSE SERPL-MCNC: 137 MG/DL (ref 70–99)
HCT VFR BLD AUTO: 37 % (ref 40–53)
HGB BLD-MCNC: 12.3 G/DL (ref 13.3–17.7)
IMM GRANULOCYTES # BLD: 0.1 10E3/UL
IMM GRANULOCYTES NFR BLD: 1 %
INR PPP: 1.22 (ref 0.85–1.15)
INTERPRETATION ECG - MUSE: NORMAL
LYMPHOCYTES # BLD AUTO: 1 10E3/UL (ref 0.8–5.3)
LYMPHOCYTES NFR BLD AUTO: 10 %
MCH RBC QN AUTO: 31.8 PG (ref 26.5–33)
MCHC RBC AUTO-ENTMCNC: 33.2 G/DL (ref 31.5–36.5)
MCV RBC AUTO: 96 FL (ref 78–100)
MONOCYTES # BLD AUTO: 0.6 10E3/UL (ref 0–1.3)
MONOCYTES NFR BLD AUTO: 7 %
NEUTROPHILS # BLD AUTO: 7.4 10E3/UL (ref 1.6–8.3)
NEUTROPHILS NFR BLD AUTO: 80 %
NRBC # BLD AUTO: 0 10E3/UL
NRBC BLD AUTO-RTO: 0 /100
P AXIS - MUSE: 80 DEGREES
PLATELET # BLD AUTO: 153 10E3/UL (ref 150–450)
POTASSIUM SERPL-SCNC: 3.8 MMOL/L (ref 3.4–5.3)
PR INTERVAL - MUSE: 178 MS
PROT SERPL-MCNC: 6.7 G/DL (ref 6.4–8.3)
QRS DURATION - MUSE: 102 MS
QT - MUSE: 356 MS
QTC - MUSE: 447 MS
R AXIS - MUSE: 78 DEGREES
RBC # BLD AUTO: 3.87 10E6/UL (ref 4.4–5.9)
SODIUM SERPL-SCNC: 130 MMOL/L (ref 136–145)
SPECIMEN EXPIRATION DATE: NORMAL
SYSTOLIC BLOOD PRESSURE - MUSE: NORMAL MMHG
T AXIS - MUSE: 83 DEGREES
VENTRICULAR RATE- MUSE: 95 BPM
WBC # BLD AUTO: 9.3 10E3/UL (ref 4–11)

## 2023-06-29 PROCEDURE — 72170 X-RAY EXAM OF PELVIS: CPT

## 2023-06-29 PROCEDURE — 99221 1ST HOSP IP/OBS SF/LOW 40: CPT | Mod: GC

## 2023-06-29 PROCEDURE — 99285 EMERGENCY DEPT VISIT HI MDM: CPT

## 2023-06-29 PROCEDURE — 99285 EMERGENCY DEPT VISIT HI MDM: CPT | Mod: 25 | Performed by: EMERGENCY MEDICINE

## 2023-06-29 PROCEDURE — 86850 RBC ANTIBODY SCREEN: CPT | Performed by: EMERGENCY MEDICINE

## 2023-06-29 PROCEDURE — 93010 ELECTROCARDIOGRAM REPORT: CPT | Performed by: EMERGENCY MEDICINE

## 2023-06-29 PROCEDURE — 85025 COMPLETE CBC W/AUTO DIFF WBC: CPT | Performed by: EMERGENCY MEDICINE

## 2023-06-29 PROCEDURE — 120N000002 HC R&B MED SURG/OB UMMC

## 2023-06-29 PROCEDURE — 86901 BLOOD TYPING SEROLOGIC RH(D): CPT | Performed by: EMERGENCY MEDICINE

## 2023-06-29 PROCEDURE — 73552 X-RAY EXAM OF FEMUR 2/>: CPT | Mod: 26 | Performed by: RADIOLOGY

## 2023-06-29 PROCEDURE — 250N000011 HC RX IP 250 OP 636: Performed by: EMERGENCY MEDICINE

## 2023-06-29 PROCEDURE — 73502 X-RAY EXAM HIP UNI 2-3 VIEWS: CPT | Mod: 26 | Performed by: RADIOLOGY

## 2023-06-29 PROCEDURE — 73502 X-RAY EXAM HIP UNI 2-3 VIEWS: CPT

## 2023-06-29 PROCEDURE — 80053 COMPREHEN METABOLIC PANEL: CPT | Performed by: EMERGENCY MEDICINE

## 2023-06-29 PROCEDURE — 86923 COMPATIBILITY TEST ELECTRIC: CPT

## 2023-06-29 PROCEDURE — 72170 X-RAY EXAM OF PELVIS: CPT | Mod: 26 | Performed by: RADIOLOGY

## 2023-06-29 PROCEDURE — 85610 PROTHROMBIN TIME: CPT | Performed by: EMERGENCY MEDICINE

## 2023-06-29 PROCEDURE — 73552 X-RAY EXAM OF FEMUR 2/>: CPT | Mod: RT

## 2023-06-29 PROCEDURE — 93005 ELECTROCARDIOGRAM TRACING: CPT

## 2023-06-29 PROCEDURE — 71045 X-RAY EXAM CHEST 1 VIEW: CPT | Mod: 26 | Performed by: RADIOLOGY

## 2023-06-29 PROCEDURE — 71045 X-RAY EXAM CHEST 1 VIEW: CPT

## 2023-06-29 PROCEDURE — 36415 COLL VENOUS BLD VENIPUNCTURE: CPT | Performed by: EMERGENCY MEDICINE

## 2023-06-29 RX ORDER — HYDROMORPHONE HYDROCHLORIDE 1 MG/ML
0.5 INJECTION, SOLUTION INTRAMUSCULAR; INTRAVENOUS; SUBCUTANEOUS
Status: DISCONTINUED | OUTPATIENT
Start: 2023-06-29 | End: 2023-06-30

## 2023-06-29 RX ORDER — ONDANSETRON 2 MG/ML
4 INJECTION INTRAMUSCULAR; INTRAVENOUS EVERY 6 HOURS PRN
Status: DISCONTINUED | OUTPATIENT
Start: 2023-06-29 | End: 2023-06-30

## 2023-06-29 RX ADMIN — DEXTROSE AND SODIUM CHLORIDE: 5; 450 INJECTION, SOLUTION INTRAVENOUS at 22:12

## 2023-06-29 ASSESSMENT — ACTIVITIES OF DAILY LIVING (ADL)
ADLS_ACUITY_SCORE: 35
ADLS_ACUITY_SCORE: 35
ADLS_ACUITY_SCORE: 33
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
## 2023-06-29 NOTE — LETTER
Health Information Management Services               Recipient:  [facility]          Sender:  JOSE ALFREDO Monsalve  [phone]          Date: July 10, 2023  Patient Name:  Amos Walker  Patient YOB: 1945  Routing Message:  Discharge orders for DONOVAN MAN          The documents accompanying this notice contain confidential information belonging to the sender.  This information is intended only for the use of the individual or entity named above.  The authorized recipient of this information is prohibited from disclosing this information to any other party and is required to destroy the information after its stated need has been fulfilled, unless otherwise required by state law.      If you are not the intended recipient, you are hereby notified that any disclosure, copy, distribution or action taken in reliance on the contents of these documents is strictly prohibited.  If you have received this document in error, please return it by fax to [phone] with a note on the cover sheet explaining why you are returning it (e.g. not your patient, not your provider, etc.).  If you need further assistance, please call [facility] Centralized Transcription at [phone].  Documents may also be returned by mail to [employer], , [facility] [address], [place], [place] [address].

## 2023-06-30 ENCOUNTER — ANESTHESIA EVENT (OUTPATIENT)
Dept: SURGERY | Facility: CLINIC | Age: 78
DRG: 522 | End: 2023-06-30
Payer: COMMERCIAL

## 2023-06-30 ENCOUNTER — APPOINTMENT (OUTPATIENT)
Dept: GENERAL RADIOLOGY | Facility: CLINIC | Age: 78
DRG: 522 | End: 2023-06-30
Payer: COMMERCIAL

## 2023-06-30 ENCOUNTER — ANESTHESIA (OUTPATIENT)
Dept: SURGERY | Facility: CLINIC | Age: 78
DRG: 522 | End: 2023-06-30
Payer: COMMERCIAL

## 2023-06-30 LAB
ALBUMIN SERPL BCG-MCNC: 3.6 G/DL (ref 3.5–5.2)
ANION GAP SERPL CALCULATED.3IONS-SCNC: 10 MMOL/L (ref 7–15)
BLD PROD TYP BPU: NORMAL
BLOOD COMPONENT TYPE: NORMAL
BUN SERPL-MCNC: 21.2 MG/DL (ref 8–23)
CALCIUM SERPL-MCNC: 8.5 MG/DL (ref 8.8–10.2)
CHLORIDE SERPL-SCNC: 99 MMOL/L (ref 98–107)
CODING SYSTEM: NORMAL
CREAT SERPL-MCNC: 1.11 MG/DL (ref 0.67–1.17)
CREAT UR-MCNC: 40.4 MG/DL
CROSSMATCH: NORMAL
DEPRECATED HCO3 PLAS-SCNC: 25 MMOL/L (ref 22–29)
ERYTHROCYTE [DISTWIDTH] IN BLOOD BY AUTOMATED COUNT: 12.5 % (ref 10–15)
GFR SERPL CREATININE-BSD FRML MDRD: 69 ML/MIN/1.73M2
GLUCOSE BLDC GLUCOMTR-MCNC: 149 MG/DL (ref 70–99)
GLUCOSE BLDC GLUCOMTR-MCNC: 164 MG/DL (ref 70–99)
GLUCOSE BLDC GLUCOMTR-MCNC: 194 MG/DL (ref 70–99)
GLUCOSE BLDC GLUCOMTR-MCNC: 196 MG/DL (ref 70–99)
GLUCOSE BLDC GLUCOMTR-MCNC: 95 MG/DL (ref 70–99)
GLUCOSE SERPL-MCNC: 173 MG/DL (ref 70–99)
HCT VFR BLD AUTO: 36.1 % (ref 40–53)
HGB BLD-MCNC: 12.4 G/DL (ref 13.3–17.7)
MAGNESIUM SERPL-MCNC: 1.7 MG/DL (ref 1.7–2.3)
MCH RBC QN AUTO: 32.6 PG (ref 26.5–33)
MCHC RBC AUTO-ENTMCNC: 34.3 G/DL (ref 31.5–36.5)
MCV RBC AUTO: 95 FL (ref 78–100)
OSMOLALITY SERPL: 283 MMOL/KG (ref 280–301)
PHOSPHATE SERPL-MCNC: 2.9 MG/DL (ref 2.5–4.5)
PLATELET # BLD AUTO: 132 10E3/UL (ref 150–450)
POTASSIUM SERPL-SCNC: 3.4 MMOL/L (ref 3.4–5.3)
RBC # BLD AUTO: 3.8 10E6/UL (ref 4.4–5.9)
SODIUM SERPL-SCNC: 134 MMOL/L (ref 136–145)
SODIUM UR-SCNC: 21 MMOL/L
UNIT ABO/RH: NORMAL
UNIT NUMBER: NORMAL
UNIT STATUS: NORMAL
UNIT TYPE ISBT: 5100
WBC # BLD AUTO: 6.8 10E3/UL (ref 4–11)

## 2023-06-30 PROCEDURE — C1713 ANCHOR/SCREW BN/BN,TIS/BN: HCPCS | Performed by: ORTHOPAEDIC SURGERY

## 2023-06-30 PROCEDURE — C1776 JOINT DEVICE (IMPLANTABLE): HCPCS | Performed by: ORTHOPAEDIC SURGERY

## 2023-06-30 PROCEDURE — 250N000012 HC RX MED GY IP 250 OP 636 PS 637: Performed by: INTERNAL MEDICINE

## 2023-06-30 PROCEDURE — 250N000013 HC RX MED GY IP 250 OP 250 PS 637: Performed by: INTERNAL MEDICINE

## 2023-06-30 PROCEDURE — 84300 ASSAY OF URINE SODIUM: CPT | Performed by: PEDIATRICS

## 2023-06-30 PROCEDURE — 710N000010 HC RECOVERY PHASE 1, LEVEL 2, PER MIN: Performed by: ORTHOPAEDIC SURGERY

## 2023-06-30 PROCEDURE — 83735 ASSAY OF MAGNESIUM: CPT | Performed by: PEDIATRICS

## 2023-06-30 PROCEDURE — 250N000013 HC RX MED GY IP 250 OP 250 PS 637

## 2023-06-30 PROCEDURE — 250N000011 HC RX IP 250 OP 636: Performed by: NURSE ANESTHETIST, CERTIFIED REGISTERED

## 2023-06-30 PROCEDURE — 370N000017 HC ANESTHESIA TECHNICAL FEE, PER MIN: Performed by: ORTHOPAEDIC SURGERY

## 2023-06-30 PROCEDURE — 250N000011 HC RX IP 250 OP 636: Performed by: EMERGENCY MEDICINE

## 2023-06-30 PROCEDURE — 258N000001 HC RX 258: Performed by: ORTHOPAEDIC SURGERY

## 2023-06-30 PROCEDURE — 250N000009 HC RX 250: Performed by: NURSE ANESTHETIST, CERTIFIED REGISTERED

## 2023-06-30 PROCEDURE — 250N000011 HC RX IP 250 OP 636: Performed by: STUDENT IN AN ORGANIZED HEALTH CARE EDUCATION/TRAINING PROGRAM

## 2023-06-30 PROCEDURE — 250N000025 HC SEVOFLURANE, PER MIN: Performed by: ORTHOPAEDIC SURGERY

## 2023-06-30 PROCEDURE — 36415 COLL VENOUS BLD VENIPUNCTURE: CPT | Performed by: PEDIATRICS

## 2023-06-30 PROCEDURE — 99222 1ST HOSP IP/OBS MODERATE 55: CPT | Performed by: PEDIATRICS

## 2023-06-30 PROCEDURE — 250N000011 HC RX IP 250 OP 636: Mod: JZ | Performed by: PEDIATRICS

## 2023-06-30 PROCEDURE — 82570 ASSAY OF URINE CREATININE: CPT | Performed by: PEDIATRICS

## 2023-06-30 PROCEDURE — 83930 ASSAY OF BLOOD OSMOLALITY: CPT | Performed by: PEDIATRICS

## 2023-06-30 PROCEDURE — 258N000003 HC RX IP 258 OP 636: Performed by: ORTHOPAEDIC SURGERY

## 2023-06-30 PROCEDURE — 250N000011 HC RX IP 250 OP 636

## 2023-06-30 PROCEDURE — 999N000141 HC STATISTIC PRE-PROCEDURE NURSING ASSESSMENT: Performed by: ORTHOPAEDIC SURGERY

## 2023-06-30 PROCEDURE — 0SRR0J9 REPLACEMENT OF RIGHT HIP JOINT, FEMORAL SURFACE WITH SYNTHETIC SUBSTITUTE, CEMENTED, OPEN APPROACH: ICD-10-PCS | Performed by: ORTHOPAEDIC SURGERY

## 2023-06-30 PROCEDURE — 250N000013 HC RX MED GY IP 250 OP 250 PS 637: Performed by: STUDENT IN AN ORGANIZED HEALTH CARE EDUCATION/TRAINING PROGRAM

## 2023-06-30 PROCEDURE — 27236 TREAT THIGH FRACTURE: CPT | Mod: RT | Performed by: ORTHOPAEDIC SURGERY

## 2023-06-30 PROCEDURE — 999N000065 XR PELVIS AND HIP PORTABLE RIGHT 1 VIEW

## 2023-06-30 PROCEDURE — G0463 HOSPITAL OUTPT CLINIC VISIT: HCPCS

## 2023-06-30 PROCEDURE — 360N000077 HC SURGERY LEVEL 4, PER MIN: Performed by: ORTHOPAEDIC SURGERY

## 2023-06-30 PROCEDURE — 272N000001 HC OR GENERAL SUPPLY STERILE: Performed by: ORTHOPAEDIC SURGERY

## 2023-06-30 PROCEDURE — 85027 COMPLETE CBC AUTOMATED: CPT | Performed by: PEDIATRICS

## 2023-06-30 PROCEDURE — 258N000003 HC RX IP 258 OP 636: Performed by: NURSE ANESTHETIST, CERTIFIED REGISTERED

## 2023-06-30 PROCEDURE — 80069 RENAL FUNCTION PANEL: CPT | Performed by: PEDIATRICS

## 2023-06-30 PROCEDURE — 120N000002 HC R&B MED SURG/OB UMMC

## 2023-06-30 PROCEDURE — 250N000011 HC RX IP 250 OP 636: Mod: JZ | Performed by: ORTHOPAEDIC SURGERY

## 2023-06-30 PROCEDURE — 258N000003 HC RX IP 258 OP 636: Performed by: STUDENT IN AN ORGANIZED HEALTH CARE EDUCATION/TRAINING PROGRAM

## 2023-06-30 PROCEDURE — 250N000013 HC RX MED GY IP 250 OP 250 PS 637: Performed by: PEDIATRICS

## 2023-06-30 DEVICE — BIPOLAR COMPONENT
Type: IMPLANTABLE DEVICE | Site: HIP | Status: FUNCTIONAL
Brand: UHR

## 2023-06-30 DEVICE — FEMORAL HEAD
Type: IMPLANTABLE DEVICE | Site: HIP | Status: FUNCTIONAL
Brand: C-TAPER HEAD

## 2023-06-30 DEVICE — UNIVERSAL DISTAL CEMENT SPACER
Type: IMPLANTABLE DEVICE | Site: HIP | Status: FUNCTIONAL
Brand: OMNIFIT

## 2023-06-30 DEVICE — FULL DOSE BONE CEMENT, 10 PACK CATALOG NUMBER IS 6191-1-010
Type: IMPLANTABLE DEVICE | Site: HIP | Status: FUNCTIONAL
Brand: SIMPLEX

## 2023-06-30 DEVICE — 127 DEGREE CEMENTED HIP STEM
Type: IMPLANTABLE DEVICE | Site: HIP | Status: FUNCTIONAL
Brand: OMNIFIT

## 2023-06-30 RX ORDER — LISINOPRIL 5 MG/1
10 TABLET ORAL DAILY
Status: DISCONTINUED | OUTPATIENT
Start: 2023-07-01 | End: 2023-07-10 | Stop reason: HOSPADM

## 2023-06-30 RX ORDER — MAGNESIUM SULFATE HEPTAHYDRATE 40 MG/ML
INJECTION, SOLUTION INTRAVENOUS PRN
Status: DISCONTINUED | OUTPATIENT
Start: 2023-06-30 | End: 2023-06-30

## 2023-06-30 RX ORDER — MEPERIDINE HYDROCHLORIDE 25 MG/ML
12.5 INJECTION INTRAMUSCULAR; INTRAVENOUS; SUBCUTANEOUS EVERY 5 MIN PRN
Status: DISCONTINUED | OUTPATIENT
Start: 2023-06-30 | End: 2023-06-30 | Stop reason: HOSPADM

## 2023-06-30 RX ORDER — AMOXICILLIN 250 MG
1 CAPSULE ORAL 2 TIMES DAILY
Status: DISCONTINUED | OUTPATIENT
Start: 2023-06-30 | End: 2023-07-10 | Stop reason: HOSPADM

## 2023-06-30 RX ORDER — CEFAZOLIN SODIUM 1 G/3ML
1 INJECTION, POWDER, FOR SOLUTION INTRAMUSCULAR; INTRAVENOUS EVERY 8 HOURS
Status: COMPLETED | OUTPATIENT
Start: 2023-06-30 | End: 2023-07-01

## 2023-06-30 RX ORDER — CEFAZOLIN SODIUM/WATER 2 G/20 ML
2 SYRINGE (ML) INTRAVENOUS SEE ADMIN INSTRUCTIONS
Status: DISCONTINUED | OUTPATIENT
Start: 2023-06-30 | End: 2023-06-30 | Stop reason: HOSPADM

## 2023-06-30 RX ORDER — NICOTINE POLACRILEX 4 MG
15-30 LOZENGE BUCCAL
Status: DISCONTINUED | OUTPATIENT
Start: 2023-06-30 | End: 2023-07-10 | Stop reason: HOSPADM

## 2023-06-30 RX ORDER — ALBUTEROL SULFATE 0.83 MG/ML
2.5 SOLUTION RESPIRATORY (INHALATION) EVERY 4 HOURS PRN
Status: DISCONTINUED | OUTPATIENT
Start: 2023-06-30 | End: 2023-06-30 | Stop reason: HOSPADM

## 2023-06-30 RX ORDER — NALOXONE HYDROCHLORIDE 0.4 MG/ML
0.4 INJECTION, SOLUTION INTRAMUSCULAR; INTRAVENOUS; SUBCUTANEOUS
Status: DISCONTINUED | OUTPATIENT
Start: 2023-06-30 | End: 2023-07-10 | Stop reason: HOSPADM

## 2023-06-30 RX ORDER — HYDROMORPHONE HYDROCHLORIDE 1 MG/ML
0.4 INJECTION, SOLUTION INTRAMUSCULAR; INTRAVENOUS; SUBCUTANEOUS EVERY 5 MIN PRN
Status: DISCONTINUED | OUTPATIENT
Start: 2023-06-30 | End: 2023-06-30 | Stop reason: HOSPADM

## 2023-06-30 RX ORDER — SIMVASTATIN 40 MG
40 TABLET ORAL AT BEDTIME
Status: DISCONTINUED | OUTPATIENT
Start: 2023-06-30 | End: 2023-07-10 | Stop reason: HOSPADM

## 2023-06-30 RX ORDER — LISINOPRIL 5 MG/1
10 TABLET ORAL DAILY
Status: DISCONTINUED | OUTPATIENT
Start: 2023-06-30 | End: 2023-06-30

## 2023-06-30 RX ORDER — DEXTROSE MONOHYDRATE 25 G/50ML
25-50 INJECTION, SOLUTION INTRAVENOUS
Status: DISCONTINUED | OUTPATIENT
Start: 2023-06-30 | End: 2023-07-10 | Stop reason: HOSPADM

## 2023-06-30 RX ORDER — NALOXONE HYDROCHLORIDE 0.4 MG/ML
0.2 INJECTION, SOLUTION INTRAMUSCULAR; INTRAVENOUS; SUBCUTANEOUS
Status: DISCONTINUED | OUTPATIENT
Start: 2023-06-30 | End: 2023-07-10 | Stop reason: HOSPADM

## 2023-06-30 RX ORDER — CETIRIZINE HYDROCHLORIDE 10 MG/1
10 TABLET ORAL DAILY
Status: DISCONTINUED | OUTPATIENT
Start: 2023-06-30 | End: 2023-07-10 | Stop reason: HOSPADM

## 2023-06-30 RX ORDER — POLYETHYLENE GLYCOL 3350 17 G/17G
17 POWDER, FOR SOLUTION ORAL DAILY
Status: DISCONTINUED | OUTPATIENT
Start: 2023-06-30 | End: 2023-06-30

## 2023-06-30 RX ORDER — LIDOCAINE 40 MG/G
CREAM TOPICAL
Status: DISCONTINUED | OUTPATIENT
Start: 2023-06-30 | End: 2023-07-10 | Stop reason: HOSPADM

## 2023-06-30 RX ORDER — POLYETHYLENE GLYCOL 3350 17 G/17G
17 POWDER, FOR SOLUTION ORAL DAILY
Status: DISCONTINUED | OUTPATIENT
Start: 2023-07-01 | End: 2023-07-10 | Stop reason: HOSPADM

## 2023-06-30 RX ORDER — HYDROMORPHONE HCL IN WATER/PF 6 MG/30 ML
0.2 PATIENT CONTROLLED ANALGESIA SYRINGE INTRAVENOUS
Status: DISCONTINUED | OUTPATIENT
Start: 2023-06-30 | End: 2023-07-10 | Stop reason: HOSPADM

## 2023-06-30 RX ORDER — BISACODYL 10 MG
10 SUPPOSITORY, RECTAL RECTAL DAILY PRN
Status: DISCONTINUED | OUTPATIENT
Start: 2023-06-30 | End: 2023-07-10 | Stop reason: HOSPADM

## 2023-06-30 RX ORDER — METOPROLOL TARTRATE 1 MG/ML
1-2 INJECTION, SOLUTION INTRAVENOUS EVERY 5 MIN PRN
Status: DISCONTINUED | OUTPATIENT
Start: 2023-06-30 | End: 2023-06-30 | Stop reason: HOSPADM

## 2023-06-30 RX ORDER — NICOTINE POLACRILEX 4 MG
15-30 LOZENGE BUCCAL
Status: DISCONTINUED | OUTPATIENT
Start: 2023-06-30 | End: 2023-06-30

## 2023-06-30 RX ORDER — PROPOFOL 10 MG/ML
INJECTION, EMULSION INTRAVENOUS PRN
Status: DISCONTINUED | OUTPATIENT
Start: 2023-06-30 | End: 2023-06-30

## 2023-06-30 RX ORDER — ASPIRIN 81 MG/1
81 TABLET ORAL EVERY OTHER DAY
Status: DISCONTINUED | OUTPATIENT
Start: 2023-06-30 | End: 2023-07-10 | Stop reason: HOSPADM

## 2023-06-30 RX ORDER — INSULIN LISPRO 100 [IU]/ML
3-4 INJECTION, SOLUTION INTRAVENOUS; SUBCUTANEOUS
Status: DISCONTINUED | OUTPATIENT
Start: 2023-06-30 | End: 2023-06-30

## 2023-06-30 RX ORDER — SODIUM CHLORIDE, SODIUM LACTATE, POTASSIUM CHLORIDE, CALCIUM CHLORIDE 600; 310; 30; 20 MG/100ML; MG/100ML; MG/100ML; MG/100ML
INJECTION, SOLUTION INTRAVENOUS CONTINUOUS
Status: DISCONTINUED | OUTPATIENT
Start: 2023-06-30 | End: 2023-06-30 | Stop reason: HOSPADM

## 2023-06-30 RX ORDER — ONDANSETRON 4 MG/1
4 TABLET, ORALLY DISINTEGRATING ORAL EVERY 6 HOURS PRN
Status: DISCONTINUED | OUTPATIENT
Start: 2023-06-30 | End: 2023-06-30

## 2023-06-30 RX ORDER — GENTAMICIN SULFATE 1 MG/G
CREAM TOPICAL DAILY
Status: DISCONTINUED | OUTPATIENT
Start: 2023-06-30 | End: 2023-07-10 | Stop reason: HOSPADM

## 2023-06-30 RX ORDER — FENTANYL CITRATE 50 UG/ML
25 INJECTION, SOLUTION INTRAMUSCULAR; INTRAVENOUS EVERY 5 MIN PRN
Status: DISCONTINUED | OUTPATIENT
Start: 2023-06-30 | End: 2023-06-30 | Stop reason: HOSPADM

## 2023-06-30 RX ORDER — CALCIUM CARBONATE 500(1250)
500 TABLET ORAL 2 TIMES DAILY
Status: DISCONTINUED | OUTPATIENT
Start: 2023-06-30 | End: 2023-07-10 | Stop reason: HOSPADM

## 2023-06-30 RX ORDER — POTASSIUM CHLORIDE 1500 MG/1
40 TABLET, EXTENDED RELEASE ORAL ONCE
Status: COMPLETED | OUTPATIENT
Start: 2023-06-30 | End: 2023-06-30

## 2023-06-30 RX ORDER — CLOPIDOGREL BISULFATE 75 MG/1
75 TABLET ORAL DAILY
Status: DISCONTINUED | OUTPATIENT
Start: 2023-06-30 | End: 2023-07-10 | Stop reason: HOSPADM

## 2023-06-30 RX ORDER — FENTANYL CITRATE 50 UG/ML
50 INJECTION, SOLUTION INTRAMUSCULAR; INTRAVENOUS EVERY 5 MIN PRN
Status: DISCONTINUED | OUTPATIENT
Start: 2023-06-30 | End: 2023-06-30 | Stop reason: HOSPADM

## 2023-06-30 RX ORDER — CEFAZOLIN SODIUM/WATER 2 G/20 ML
2 SYRINGE (ML) INTRAVENOUS
Status: COMPLETED | OUTPATIENT
Start: 2023-06-30 | End: 2023-06-30

## 2023-06-30 RX ORDER — ONDANSETRON 2 MG/ML
4 INJECTION INTRAMUSCULAR; INTRAVENOUS EVERY 6 HOURS PRN
Status: DISCONTINUED | OUTPATIENT
Start: 2023-06-30 | End: 2023-07-10 | Stop reason: HOSPADM

## 2023-06-30 RX ORDER — ONDANSETRON 4 MG/1
4 TABLET, ORALLY DISINTEGRATING ORAL EVERY 6 HOURS PRN
Status: DISCONTINUED | OUTPATIENT
Start: 2023-06-30 | End: 2023-07-10 | Stop reason: HOSPADM

## 2023-06-30 RX ORDER — PROCHLORPERAZINE MALEATE 5 MG
5 TABLET ORAL EVERY 6 HOURS PRN
Status: DISCONTINUED | OUTPATIENT
Start: 2023-06-30 | End: 2023-07-10 | Stop reason: HOSPADM

## 2023-06-30 RX ORDER — ACETAMINOPHEN 325 MG/1
975 TABLET ORAL EVERY 8 HOURS SCHEDULED
Status: DISCONTINUED | OUTPATIENT
Start: 2023-06-30 | End: 2023-07-03

## 2023-06-30 RX ORDER — TRANEXAMIC ACID 650 MG/1
1950 TABLET ORAL ONCE
Status: COMPLETED | OUTPATIENT
Start: 2023-06-30 | End: 2023-06-30

## 2023-06-30 RX ORDER — LIDOCAINE HYDROCHLORIDE 20 MG/ML
INJECTION, SOLUTION INFILTRATION; PERINEURAL PRN
Status: DISCONTINUED | OUTPATIENT
Start: 2023-06-30 | End: 2023-06-30

## 2023-06-30 RX ORDER — ASCORBIC ACID 500 MG
500 TABLET ORAL DAILY
Status: DISCONTINUED | OUTPATIENT
Start: 2023-06-30 | End: 2023-07-10 | Stop reason: HOSPADM

## 2023-06-30 RX ORDER — FENTANYL CITRATE 50 UG/ML
INJECTION, SOLUTION INTRAMUSCULAR; INTRAVENOUS PRN
Status: DISCONTINUED | OUTPATIENT
Start: 2023-06-30 | End: 2023-06-30

## 2023-06-30 RX ORDER — SODIUM CHLORIDE, SODIUM LACTATE, POTASSIUM CHLORIDE, CALCIUM CHLORIDE 600; 310; 30; 20 MG/100ML; MG/100ML; MG/100ML; MG/100ML
INJECTION, SOLUTION INTRAVENOUS CONTINUOUS PRN
Status: DISCONTINUED | OUTPATIENT
Start: 2023-06-30 | End: 2023-06-30

## 2023-06-30 RX ORDER — ONDANSETRON 2 MG/ML
4 INJECTION INTRAMUSCULAR; INTRAVENOUS EVERY 6 HOURS PRN
Status: DISCONTINUED | OUTPATIENT
Start: 2023-06-30 | End: 2023-06-30

## 2023-06-30 RX ORDER — HYDROMORPHONE HYDROCHLORIDE 1 MG/ML
0.2 INJECTION, SOLUTION INTRAMUSCULAR; INTRAVENOUS; SUBCUTANEOUS EVERY 5 MIN PRN
Status: DISCONTINUED | OUTPATIENT
Start: 2023-06-30 | End: 2023-06-30 | Stop reason: HOSPADM

## 2023-06-30 RX ORDER — LIDOCAINE 40 MG/G
CREAM TOPICAL
Status: DISCONTINUED | OUTPATIENT
Start: 2023-06-30 | End: 2023-07-05

## 2023-06-30 RX ORDER — ONDANSETRON 4 MG/1
4 TABLET, ORALLY DISINTEGRATING ORAL EVERY 30 MIN PRN
Status: DISCONTINUED | OUTPATIENT
Start: 2023-06-30 | End: 2023-06-30 | Stop reason: HOSPADM

## 2023-06-30 RX ORDER — EPHEDRINE SULFATE 50 MG/ML
INJECTION, SOLUTION INTRAMUSCULAR; INTRAVENOUS; SUBCUTANEOUS PRN
Status: DISCONTINUED | OUTPATIENT
Start: 2023-06-30 | End: 2023-06-30

## 2023-06-30 RX ORDER — SODIUM CHLORIDE, SODIUM LACTATE, POTASSIUM CHLORIDE, CALCIUM CHLORIDE 600; 310; 30; 20 MG/100ML; MG/100ML; MG/100ML; MG/100ML
INJECTION, SOLUTION INTRAVENOUS CONTINUOUS
Status: DISCONTINUED | OUTPATIENT
Start: 2023-06-30 | End: 2023-07-10 | Stop reason: HOSPADM

## 2023-06-30 RX ORDER — KETAMINE HYDROCHLORIDE 10 MG/ML
INJECTION INTRAMUSCULAR; INTRAVENOUS PRN
Status: DISCONTINUED | OUTPATIENT
Start: 2023-06-30 | End: 2023-06-30

## 2023-06-30 RX ORDER — ONDANSETRON 2 MG/ML
4 INJECTION INTRAMUSCULAR; INTRAVENOUS EVERY 30 MIN PRN
Status: DISCONTINUED | OUTPATIENT
Start: 2023-06-30 | End: 2023-06-30 | Stop reason: HOSPADM

## 2023-06-30 RX ORDER — ONDANSETRON 2 MG/ML
INJECTION INTRAMUSCULAR; INTRAVENOUS PRN
Status: DISCONTINUED | OUTPATIENT
Start: 2023-06-30 | End: 2023-06-30

## 2023-06-30 RX ORDER — VITAMIN B COMPLEX
25 TABLET ORAL 2 TIMES DAILY
Status: DISCONTINUED | OUTPATIENT
Start: 2023-06-30 | End: 2023-07-10 | Stop reason: HOSPADM

## 2023-06-30 RX ORDER — ALENDRONATE SODIUM 70 MG/1
70 TABLET ORAL
Status: DISCONTINUED | OUTPATIENT
Start: 2023-07-01 | End: 2023-06-30

## 2023-06-30 RX ORDER — DEXTROSE MONOHYDRATE 25 G/50ML
25-50 INJECTION, SOLUTION INTRAVENOUS
Status: DISCONTINUED | OUTPATIENT
Start: 2023-06-30 | End: 2023-06-30

## 2023-06-30 RX ORDER — NICOTINE 21 MG/24HR
1 PATCH, TRANSDERMAL 24 HOURS TRANSDERMAL DAILY
Status: DISCONTINUED | OUTPATIENT
Start: 2023-06-30 | End: 2023-07-10 | Stop reason: HOSPADM

## 2023-06-30 RX ADMIN — Medication 2 G: at 07:44

## 2023-06-30 RX ADMIN — PROPOFOL 130 MG: 10 INJECTION, EMULSION INTRAVENOUS at 07:51

## 2023-06-30 RX ADMIN — SODIUM CHLORIDE, POTASSIUM CHLORIDE, SODIUM LACTATE AND CALCIUM CHLORIDE: 600; 310; 30; 20 INJECTION, SOLUTION INTRAVENOUS at 07:30

## 2023-06-30 RX ADMIN — SODIUM CHLORIDE, POTASSIUM CHLORIDE, SODIUM LACTATE AND CALCIUM CHLORIDE: 600; 310; 30; 20 INJECTION, SOLUTION INTRAVENOUS at 16:19

## 2023-06-30 RX ADMIN — CEFAZOLIN 1 G: 1 INJECTION, POWDER, FOR SOLUTION INTRAMUSCULAR; INTRAVENOUS at 18:06

## 2023-06-30 RX ADMIN — TRANEXAMIC ACID 1950 MG: 650 TABLET ORAL at 06:35

## 2023-06-30 RX ADMIN — SIMVASTATIN 40 MG: 40 TABLET, FILM COATED ORAL at 03:04

## 2023-06-30 RX ADMIN — ACETAMINOPHEN 975 MG: 325 TABLET, FILM COATED ORAL at 13:19

## 2023-06-30 RX ADMIN — CALCIUM 500 MG: 500 TABLET ORAL at 19:47

## 2023-06-30 RX ADMIN — ONDANSETRON 4 MG: 2 INJECTION INTRAMUSCULAR; INTRAVENOUS at 09:32

## 2023-06-30 RX ADMIN — PHENYLEPHRINE HYDROCHLORIDE 150 MCG: 10 INJECTION INTRAVENOUS at 07:58

## 2023-06-30 RX ADMIN — INSULIN ASPART 2 UNITS: 100 INJECTION, SOLUTION INTRAVENOUS; SUBCUTANEOUS at 18:01

## 2023-06-30 RX ADMIN — Medication 5 MG: at 09:03

## 2023-06-30 RX ADMIN — HYDROMORPHONE HYDROCHLORIDE 0.5 MG: 1 INJECTION, SOLUTION INTRAMUSCULAR; INTRAVENOUS; SUBCUTANEOUS at 00:59

## 2023-06-30 RX ADMIN — PHENYLEPHRINE HYDROCHLORIDE 100 MCG: 10 INJECTION INTRAVENOUS at 07:48

## 2023-06-30 RX ADMIN — LIDOCAINE HYDROCHLORIDE 80 MG: 20 INJECTION, SOLUTION INFILTRATION; PERINEURAL at 07:48

## 2023-06-30 RX ADMIN — PHENYLEPHRINE HYDROCHLORIDE 100 MCG: 10 INJECTION INTRAVENOUS at 08:02

## 2023-06-30 RX ADMIN — Medication 2.5 MG: at 03:04

## 2023-06-30 RX ADMIN — POTASSIUM CHLORIDE 40 MEQ: 1500 TABLET, EXTENDED RELEASE ORAL at 14:56

## 2023-06-30 RX ADMIN — Medication 2.5 MG: at 07:50

## 2023-06-30 RX ADMIN — SUGAMMADEX 200 MG: 100 INJECTION, SOLUTION INTRAVENOUS at 09:50

## 2023-06-30 RX ADMIN — Medication 50 MG: at 07:52

## 2023-06-30 RX ADMIN — GENTAMICIN SULFATE: 1 CREAM TOPICAL at 12:00

## 2023-06-30 RX ADMIN — PHENYLEPHRINE HYDROCHLORIDE 50 MCG: 10 INJECTION INTRAVENOUS at 07:55

## 2023-06-30 RX ADMIN — Medication 10 MG: at 08:57

## 2023-06-30 RX ADMIN — Medication 30 MG: at 07:59

## 2023-06-30 RX ADMIN — ACETAMINOPHEN 975 MG: 325 TABLET, FILM COATED ORAL at 22:10

## 2023-06-30 RX ADMIN — PHENYLEPHRINE HYDROCHLORIDE 0.5 MCG/KG/MIN: 10 INJECTION INTRAVENOUS at 07:58

## 2023-06-30 RX ADMIN — SIMVASTATIN 40 MG: 40 TABLET, FILM COATED ORAL at 22:10

## 2023-06-30 RX ADMIN — Medication 25 MCG: at 19:47

## 2023-06-30 RX ADMIN — FENTANYL CITRATE 100 MCG: 50 INJECTION, SOLUTION INTRAMUSCULAR; INTRAVENOUS at 07:48

## 2023-06-30 RX ADMIN — MAGNESIUM SULFATE HEPTAHYDRATE 2 G: 40 INJECTION, SOLUTION INTRAVENOUS at 08:09

## 2023-06-30 RX ADMIN — SENNOSIDES AND DOCUSATE SODIUM 1 TABLET: 50; 8.6 TABLET ORAL at 19:47

## 2023-06-30 RX ADMIN — ACETAMINOPHEN 975 MG: 325 TABLET, FILM COATED ORAL at 03:04

## 2023-06-30 RX ADMIN — INSULIN ASPART 2 UNITS: 100 INJECTION, SOLUTION INTRAVENOUS; SUBCUTANEOUS at 15:02

## 2023-06-30 ASSESSMENT — ACTIVITIES OF DAILY LIVING (ADL)
CONCENTRATING,_REMEMBERING_OR_MAKING_DECISIONS_DIFFICULTY: NO
WEAR_GLASSES_OR_BLIND: YES
WALKING_OR_CLIMBING_STAIRS_DIFFICULTY: YES
ADLS_ACUITY_SCORE: 40
CHANGE_IN_FUNCTIONAL_STATUS_SINCE_ONSET_OF_CURRENT_ILLNESS/INJURY: YES
DIFFICULTY_COMMUNICATING: NO
DIFFICULTY_EATING/SWALLOWING: OTHER (SEE COMMENTS)
ADLS_ACUITY_SCORE: 40
ADLS_ACUITY_SCORE: 35
FALL_HISTORY_WITHIN_LAST_SIX_MONTHS: YES
ADLS_ACUITY_SCORE: 40
TOILETING_ISSUES: NO
TRANSFERRING: 1-->ASSISTANCE (EQUIPMENT/PERSON) NEEDED
ADLS_ACUITY_SCORE: 40
ADLS_ACUITY_SCORE: 40
WALKING_OR_CLIMBING_STAIRS: AMBULATION DIFFICULTY, ASSISTANCE 1 PERSON
ADLS_ACUITY_SCORE: 41
ADLS_ACUITY_SCORE: 40
ADLS_ACUITY_SCORE: 40
DRESSING/BATHING_DIFFICULTY: NO
EQUIPMENT_CURRENTLY_USED_AT_HOME: WALKER, STANDARD
ADLS_ACUITY_SCORE: 40
DOING_ERRANDS_INDEPENDENTLY_DIFFICULTY: OTHER (SEE COMMENTS)
NUMBER_OF_TIMES_PATIENT_HAS_FALLEN_WITHIN_LAST_SIX_MONTHS: 1
ADLS_ACUITY_SCORE: 35
ADLS_ACUITY_SCORE: 40
TRANSFERRING: 1-->ASSISTANCE (EQUIPMENT/PERSON) NEEDED (NOT DEVELOPMENTALLY APPROPRIATE)

## 2023-06-30 ASSESSMENT — COPD QUESTIONNAIRES: COPD: 1

## 2023-06-30 ASSESSMENT — LIFESTYLE VARIABLES: TOBACCO_USE: 1

## 2023-06-30 NOTE — OR NURSING
PACU to Inpatient Nursing Handoff    Patient Amos Walker is a 78 year old male who speaks English.   Procedure Procedure(s):  Hemiarthroplasty Hip   Surgeon(s) Primary: Abdi Keller MD  Resident - Assisting: Shaun Lemus MD     Allergies   Allergen Reactions     No Clinical Screening - See Comments      methylisothiazolinone     Seasonal Allergies      Simvastatin Other (See Comments)     Sun sensitivty     Lisinopril Dizziness     Sun sensitive     Methylisothiazolinone Rash     Neomycin      Wound gets worse     Povidone Iodine Rash       Isolation  Contact     Past Medical History   has a past medical history of Anemia, CAD (coronary artery disease), CKD (chronic kidney disease) stage 3, GFR 30-59 ml/min (H), Colon polyp, Diabetic Charcot foot (H), Emphysema of lung (H), Heart disease, HTN (hypertension), Hyperlipidemia, MRSA cellulitis of right foot, Osteopenia of both hips, PAD (peripheral artery disease) (H) (09/2018), Tobacco use, Type 2 diabetes mellitus (H), and Venous ulcer (H).    Anesthesia General   Dermatome Level     Preop Meds 1950mg Lysteda - time given: 0700   Nerve block Not applicable   Intraop Meds fentanyl (Sublimaze): 100 mcg total  ketamine (Ketalar): 40 mg given  ondansetron (Zofran): last given at 0932  Magnesium 2g for post-op pain control @ 0809   Local Meds Yes - Local Cocktail (morphine, ropivacaine, epinephrine, Toradol)   Antibiotics cefazolin (Ancef) - last given at 0730     Pain Patient Currently in Pain: denies   PACU meds  Not applicable   PCA / epidural No   Capnography  Cannula applied in PACU for IP unit, not monitoring at this time   Telemetry ECG Rhythm: Normal sinus rhythm   Inpatient Telemetry Monitor Ordered? No        Labs Glucose Lab Results   Component Value Date     06/30/2023     06/30/2023     04/04/2023     01/13/2021       Hgb Lab Results   Component Value Date    HGB 12.4 06/30/2023    HGB 9.6 01/13/2021       INR Lab  Results   Component Value Date    INR 1.22 06/29/2023    INR 1.20 10/30/2019      PACU Imaging Order released - completed 1045     Wound/Incision Wound 08/25/17 Right Leg Ulceration (Active)   Number of days: 2135       Wound 01/30/20 Bilateral Leg Other (comment) scab over wounds on feet and legs multiple sites (Active)   Number of days: 1247       Wound Foot (Active)   Number of days: 899       Wound Ankle Other (comment) Unstagable (Active)   Number of days: 899       Incision/Surgical Site 06/30/23 Right;Lateral Hip (Active)   Incision Assessment UTV 06/30/23 1002   Closure Approximated;Adhesive strip(s);Sutures 06/30/23 1002   Incision Drainage Amount None 06/30/23 1002   Dressing Intervention Clean, dry, intact 06/30/23 1002   Number of days: 0      CMS        Equipment ice pack and abductor pillow   Other LDA       IV Access Peripheral IV 06/29/23 Anterior;Left Lower forearm (Active)   Site Assessment WDL 06/30/23 1002   Line Status Infusing 06/30/23 1002   Dressing Transparent 06/30/23 1002   Dressing Status clean;dry;intact 06/30/23 1002   Phlebitis Scale 0-->no symptoms 06/30/23 1002   Number of days: 1       Peripheral IV 06/30/23 Right Lower forearm (Active)   Site Assessment [facility] 06/30/23 1002   Line Status Saline locked 06/30/23 1002   Number of days: 0      Blood Products Not applicable  mL   Intake/Output Date 06/30/23 0700 - 07/01/23 0659   Shift 8575-6117 9780-2880 8203-7128 24 Hour Total   INTAKE   I.V. 600   600   Shift Total(mL/kg) 600(7.78)   600(7.78)   OUTPUT   Urine 175   175   Blood 250   250   Shift Total(mL/kg) 425(5.51)   425(5.51)   Weight (kg) 77.11 77.11 77.11 77.11      Drains / Daniel     Time of void PreOp Time of Void Prior to Procedure: 0645 (06/30/23 0658)    PostOp Voided (mL): 75 mL (06/30/23 0730)    Diapered? No   Bladder Scan  150   PO   150 water     Vitals    B/P: 123/49  T: 97.7  F (36.5  C)    Temp src: Axillary  P:  Pulse: 84 (06/30/23 1015)          R: 19  O2:   SpO2: 100 %    O2 Device: Oxymask (06/30/23 1015)    Oxygen Delivery: 4 LPM (06/30/23 1015)         Family/support present significant other - Wife Danielle update via phone   Patient belongings  Hearing Aids only - items in IP room   Patient transported on bed   DC meds/scripts (obs/outpt) Not applicable   Inpatient Pain Meds Released? IP        Special needs/considerations None   Tasks needing completion None       Huma Wade RN  ASCOM [id number]

## 2023-06-30 NOTE — PLAN OF CARE
"  VS: BP (!) 142/59 (BP Location: Left arm, Patient Position: Supine)   Pulse 99   Temp 99.1  F (37.3  C) (Oral)   Resp 19   Ht 1.88 m (6' 2\")   Wt 77.1 kg (170 lb)   SpO2 97%   BMI 21.83 kg/m      O2: Stable on room air>90%. Denies Chest pain, or SOB   Output: Using urinal at bedside   Last BM:    Activity: bedrest   Skin: Right hip bruised  Left foot wound   Pain: Right hip fracture managed with tylenol, oxycodone   CMS: AXO X4. Baseline neuropathy   Dressing: Left foot dressing-CDI   Diet: NPO since midnight   LDA: Left PIV Infusing Dextose   Equipment: Pt belongings (Hearing aids, glasses)   Plan: nathalia today.   Additional Info: CHG bath done at 0630  Patient spouse called and updated by author                          "

## 2023-06-30 NOTE — CONSULTS
[facility]  WOC Nurse Inpatient Assessment     Consulted for: left ankle and toe     Summary: follows podiatry outpatient. (last visit with Dr. Mcclain 6/20)    Patient History (according to provider note(s):      Amos Walker is a 78 year old male who sustained a right femoral neck fracture now s/p right hip hemiarthroplasty on 6/30 with Dr. Keller.    Assessment:      Areas visualized during today's visit: feet    Wound location: left foot    Left lateral ankle    Left 2nd toe     Last photo: 6/30  Wound due to: Diabetic Ulcer, Neuropathic Ulcer and PVD  Wound history/plan of care: sees podiatry outpatient   Wound base: 100 % callous or dried drainage     Palpation of the wound bed: firm      Drainage: none     Description of drainage: none     Measurements (length x width x depth, in cm): ankle 1  x 0.8  x  0 cm & toe 0.7 x 0.4 x 0.2 cm      Tunneling: N/A     Undermining: N/A  Periwound skin: Erythema- blanchable      Color: pink and red      Temperature: normal   Odor: none  Pain: denies , none  Pain interventions prior to dressing change: N/A  Treatment goal: Infection control/prevention and Protection  STATUS: initial assessment  Supplies ordered: at bedside     Treatment Plan:     Left ankle and 2nd toe wound(s): Monday/Wednesday/Friday cleanse with saline and pat dry. Apply Gentamicin (per MAR) to wounds. Cut Aquacel Ag (order#[id number]) to fit wound. Gently moisten with a few drops of saline. Conform mepilex or Band-Aid over wounds.     Orders: Written    RECOMMEND PRIMARY TEAM ORDER: None, at this time  Education provided: plan of care and wound progress  Discussed plan of care with: Patient and Nurse  WOC nurse follow-up plan: weekly  Notify WOC if wound(s) deteriorate.  Nursing to notify the Provider(s) and re-consult the WOC Nurse if new skin concern.    DATA:     Current support surface: Standard  Standard gel/foam mattress (IsoFlex, Atmos air,  etc)  Containment of urine/stool: Incontinent pad in bed  BMI: Body mass index is 21.83 kg/m .   Active diet order: Orders Placed This Encounter      Advance Diet as Tolerated: Regular Diet Adult     Output: I/O last 3 completed shifts:  In: 301.67 [I.V.:301.67]  Out: -      Labs:   Recent Labs   Lab 06/30/23  0622 06/29/23  2131   ALBUMIN 3.6 3.7   HGB 12.4* 12.3*   INR  --  1.22*   WBC 6.8 9.3     Pressure injury risk assessment:                          Beryl Gonsalez RN CWOCN   Pager no longer is use, please contact through [employer] group: [facility] [place] Nurse  Dept. Office Number: [phone]

## 2023-06-30 NOTE — OP NOTE
DATE OF SURGERY:  6/30/2023     PREOPERATIVE DIAGNOSIS: Right femoral neck fracture    POSTOPERATIVE DIAGNOSIS: Right femoral neck fracture    PROCEDURE: Right hip hemiarthroplasty    SURGEON: Abdi Keller MD     ASSISTANT: Shaun Lemus MD close    PATIENT HISTORY: This patient had a mechanical fall and leg deformity.  He was found to have a right displaced femoral neck fracture.  He presents now for hip hemiarthroplasty understand the risks of bleed infection pain numbness tingling leg length discrepancy deep venous thrombosis pulmonary embolism.  Particular risk for bleeding and need for blood products because of his recent anticoagulation therapy.    DESCRIPTION OF PROCEDURE: The patient underwent successful induction of anesthesia.  He was turned to lateral position held with hip positioners and then the right leg and hip area were washed and sterilely prepped and draped.  We made an incision for posterior approach sharply through skin divided subcutaneous tissue with cautery.  We split the gluteus fascia and expose the posterior hip.  I released and tagged the piriformis tendon and then opened and tagged the capsule.  The femoral neck had a freshening cut and then we remove the femoral head.  We used a box osteotome canal finder lateralizer followed by straight reamers up to a cemented 9.  We placed an broaches but I felt that a cemented 8 was a good fit.  We trialed and found that a high offset +5 gave us restoration of leg lengths with  excellent stability of more than 70 degrees of internal rotation at 90 degrees of flexion.  We then remove the trial components and placed a cement spacer and cleaned and dried the femoral canal.  I cemented in a size 8 cemented Wilmar stem with a bit more anteversion than physiologic.  After removing excess cement we again trialed and found that the +5 was a bit tight.  The +2-1/2 was much looser and still give us a good leg length comparison.  We then cleaned and  dried the taper and impacted a +2-1/2 femoral head with outside diameter 51 bipolar component.  We previously measured the femoral head at 51 and also were happy with our 51 trial in terms of its stability and how well it seated.  After copiously irrigating and making sure the acetabulum was cleared we reduced the hip.  We repaired the capsule with #1 Vicryl and repaired the piriformis to the tip of the greater trochanter also with #1 Vicryl.  0 Vicryl was used to close the gluteus fascia followed by 2-0 Vicryl in the subcutaneous tissue Monocryl and the skin Steri-Strips and a sterile dry dressing.  The patient was turned supine extubated and taken to the recovery room in stable condition.  There were no complications.  The estimated blood loss is 250 mL.  I was present for all critical portions of the procedure.    Abdi Keller MD

## 2023-06-30 NOTE — PROGRESS NOTES
Patient was seen postoperatively.  Intra-Op course reviewed    Internal medicine consultation from this morning reviewed    Patient has been he medically stable, reports feeling well, with minimal pain    Vital signs stable  Patient is alert, fully oriented  Lungs clear  CV RRR  Abdomen soft  No edema      Labs from this morning reviewed:      Assessment/plan    Status post right hip hemiarthroplasty for the treatment of a displaced right femoral neck fracture.  Patient is doing well.  Estimated blood loss was 250 mL  Plan: Pain management per Ortho.  Monitor vitals.  DVT prophylaxis aspirin    Hypocalcemia and hyponatremia noted preop  Mild, low calcium may be related to mildly low albumin  Magnesium level borderline low  Plan: Lactated Ringer's postop  Repeat BMP and magnesium in a.m.    Diabetes mellitus type 2   Hemoglobin A1c 6.1 in April  On weekly dulaglutide and prandial insulin  Plan: Start prandial insulin with carb counting and sliding scale.  Dulaglutide not on formulary here  Reviewed with patient who is in agreement    History of coronary artery disease, PAD  Prior to admission on dual antiplatelet therapy with Plavix and every other day aspirin  Plan: Resume both when okay with Ortho, possibly tomorrow    History of emphysema  Not on inhalers PTA  Plan: As needed albuterol inhaler, encourage incentive spirometry      Raman Fishman MD

## 2023-06-30 NOTE — PROGRESS NOTES
Transfer Type: [facility]  Transfer Triage Note    Originating unit:[facility] ED    Final intended location for transfer: [facility]- med surg or IMC, or ICU    Tele required:  No    Time of admission request: 10:15PM    Anticipated to be boarding in ED for >4 hours? No    Patient added to Interhospital transfer list?  Yes    Brief case description:   Pt with DM2, HTN, CKD, presents after mechanical fall. Has R femoral neck fracture. Seen by Ortho, will plan for OR on 6/30AM. Admitting to medicine with ortho consulting. Will need H&P and pre-op clearance.    Accepted to med/surg no tele.     Nayana Bowers MD (Sally)  Internal Medicine/Pediatrics  Hospitalist
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
## 2023-06-30 NOTE — BRIEF OP NOTE
[facility]    Brief Operative Note    Pre-operative diagnosis: Right displaced femoral neck fracture   Post-operative diagnosis same    Procedure: Procedure(s):  Hemiarthroplasty Hip, Right  Surgeon: Surgeon(s) and Role:     * Abdi Keller MD - Primary     * Shaun Lemus MD - Resident - Assisting  Anesthesia: General   Estimated Blood Loss: 250 mL from 6/30/2023  7:40 AM to 6/30/2023 10:00 AM      Drains: None  Specimens: * No specimens in log *  Findings:   see dictated operative report .  Complications: None.  Implants:   Implant Name Type Inv. Item Serial No.  Lot No. LRB No. Used Action   BONE CEMENT SIMPLEX FULL DOSE 6191-1-001 - [id number] Cement, Bone BONE CEMENT SIMPLEX FULL DOSE 6191-1-001  [employer] [id number] Right 2 Implanted   IMP SPACER OSTEONICS DISTAL CEMENT 13MM 5250-4764 - [id number] Metallic Hardware/Dubois IMP SPACER OSTEONICS DISTAL CEMENT 13MM 3209-5873  [employer] [id number] Right 1 Implanted   IMP STEM FEMORAL EBCKY [place] OMNITFIT SZ 8 127DEG 7860-9448 - [id number] Total Joint Component/Insert IMP STEM FEMORAL BECKY [place] OMNITFIT SZ 8 127DEG 2767-6608  [employer] [id number] Right 1 Implanted   IMP HEAD STRK FEMORAL UHR BIPOLAR [id number] UH1-51-28 - [id number] Total Joint Component/Insert IMP HEAD STRK FEMORAL UHR BIPOLAR [id number] UH1-51-28  [employer] [id number] Right 1 Implanted   IMP HEAD STRK FEMORAL C-TAPER 28MM +2.5MM S-1400-HH82 - [id number] Total Joint Component/Insert IMP HEAD STRK FEMORAL C-TAPER 28MM +2.5MM S-1400-HH82  [employer] [id number] Right 1 Implanted     .Assessment: Amos Walker is a 78 year old male who sustained a right femoral neck fracture now s/p right hip hemiarthroplasty on 6/30 with Dr. Keller.     Plan:  Medicine Primary  Activity: Up with assist, posterior hip precautions   Weight bearing status: WBAT RLE.  Antibiotics: continue periop Ancef x 24 hours  Diet: Begin with  clear fluids and progress diet as tolerated.  DVT prophylaxis: Ok to resume PTA aspirin and plavix on POD1 from orthopedic perspective, will defer to medicine team.  Mechanical while in the hospital.  Bracing/Splinting: abduction pillow to be worn while in bed   Wound Care: keep dressing c/d/i x 7 days.   Pain management: utilize all oral meds first, utilize IV meds for severe breakthrough pain after PO meds given adequate time to take effect  X-rays: POD #0  Physical Therapy: eval and treat  Occupational Therapy: eval and treat   Labs: Trend Hgb on POD #1, 2, 3   Disposition: Pending progress with therapies, pain control on orals, and medical stability,  Follow-up: Clinic with Dr. Keller in 2 weeks (on or around 7/14) with no x-rays needed.    Shaun Lemus MD   Orthopaedic Surgery, PGY4  Pager: [phone]

## 2023-06-30 NOTE — ANESTHESIA POSTPROCEDURE EVALUATION
Patient: Amos Walker    Procedure: Procedure(s):  Hemiarthroplasty Hip       Anesthesia Type:  General    Note:  Disposition: Inpatient   Postop Pain Control: Uneventful            Sign Out: Well controlled pain   PONV: No   Neuro/Psych: Uneventful            Sign Out: Acceptable/Baseline neuro status   Airway/Respiratory: Uneventful            Sign Out: Acceptable/Baseline resp. status   CV/Hemodynamics: Uneventful            Sign Out: Acceptable CV status; No obvious hypovolemia; No obvious fluid overload   Other NRE: NONE   DID A NON-ROUTINE EVENT OCCUR? No           Last vitals:  Vitals Value Taken Time   /52 06/30/23 1115   Temp 36.7  C (98.1  F) 06/30/23 1115   Pulse 88 06/30/23 1115   Resp 16 06/30/23 1115   SpO2 95 % 06/30/23 1119   Vitals shown include unvalidated device data.    Electronically Signed By: Gildardo Gordon MD  June 30, 2023  11:26 AM
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
## 2023-06-30 NOTE — CONSULTS
Pain Service Consultation Note  [facility]      Patient Name: Amos Walker  MRN: [id number]   Age: 78 year old  Sex: male  Date: June 29, 2023                                      Reviewed: Yes    Referring Provider:  Dr. Sami Driver   Referring Service:  Orthopedics   Reason for Consultation: Hip Fracture     Assessment/Plan:  77yo male with a PMH of T2DM, CKD, CAD, PAD, hypertension, COPD, diabetic neuropathy, right charcot foot, admitted to the hospital with a right femoral neck fracture after a fall. Plan for OR with orthopedics on 6/30. Initially presenting to the ED on [facility], transferring to [facility] overnight.     1. Right femoral neck fracture  - Plan for michael-procedural nerve catheter in AM   - Multimodal analgesia per primary team    Thank you for the opportunity to participate in the care of Amos Walker  Pain Service will continue to follow.    Discussed with attending anesthesiologist, Dr. Aguilar.   Primary Service Contacted with Recommendations? Yes    Lane Maier MD  Anesthesia PGY-3  6/29/2023    Chief Complaint:  Hip Fracture     History of Present Illness:  Amos Walker is a 78 year old male with a PMH of T2DM, CKD, CAD, PAD, hypertension, diabetic neuropathy, right charcot foot, admitted to the hospital with a right femoral neck fracture after a fall on 6/29. Plan for OR with orthopedics on 6/30.     Patient describes that he had a mechanical fall in the evening and landed on his right hip. He has neuropathy related to his diabetes but does not have any new numbness or tingling. The patient reports that it is exacerbated by movement. He states that it is the same pain that he had when he previously broke his left hip.     Pain Assessment:   Description of Pain: Throbbing, sharp  Location: Right hip  Current Pain: 8/10  Goal: 5/10  Baseline Pain Level: 0/10  Onset: After fall   Relieving/exacerbating factors: Movement    Radiating: No    Localized: Right hip  Constant: Yes  Intermittent: No    Per [place]  review pulled from system on 06/29/23.    Past Medical History:  Past Medical History:   Diagnosis Date     Anemia      CAD (coronary artery disease)     2V CAD involving LAD and RCA, s/p DESx4 in 3/18     CKD (chronic kidney disease) stage 3, GFR 30-59 ml/min (H)      Colon polyp      Diabetic Charcot foot (H)      Emphysema of lung (H)     noted on CT     Heart disease      HTN (hypertension)      Hyperlipidemia      MRSA cellulitis of right foot     in past.      Osteopenia of both hips      PAD (peripheral artery disease) (H) 09/2018    s/p R femoral enarterectomy and stenting      Tobacco use     50+ pack     Type 2 diabetes mellitus (H)     for 25 yrs.  on insulin and starlix     Venous ulcer (H)      Family History:    Family History   Problem Relation Age of Onset     Cancer Father         colon     Kidney Disease Father      Kidney Disease Mother      Cardiovascular Son         MI in 40s     Macular Degeneration Brother      Glaucoma No family hx of      Melanoma No family hx of      Skin Cancer No family hx of      Social History:  Social History     Tobacco Use     Smoking status: Every Day     Packs/day: 0.25     Years: 50.00     Pack years: 12.50     Types: Cigarettes     Smokeless tobacco: Never   Substance Use Topics     Alcohol use: No         Review of Systems:  Complete ROS reviewed. Unless otherwise noted, all other systems found to be negative.      Laboratory Results:  Recent Labs   Lab Test 06/29/23  2131 10/30/19  2347 10/30/19  1620   INR 1.22*  --  1.20*      < > 160   PTT  --   --  28   BUN 25.6*   < > 39*    < > = values in this interval not displayed.     Allergies:  Allergies   Allergen Reactions     No Clinical Screening - See Comments      methylisothiazolinone     Seasonal Allergies      Simvastatin Other (See Comments)     Sun sensitivty     Lisinopril Dizziness     Sun sensitive     Methylisothiazolinone  "Rash     Neomycin      Wound gets worse     Povidone Iodine Rash     Current Pain Related Medications:  Medications related to Pain Management (From now, onward)    Start     Dose/Rate Route Frequency Ordered Stop    06/29/23 2107  HYDROmorphone (PF) (DILAUDID) injection 0.5 mg         0.5 mg Intravenous EVERY 1 HOUR PRN 06/29/23 2107          Physical Exam:  Vitals: /49   Pulse 76   Temp 36.8  C (98.2  F) (Tympanic)   Resp 19   Ht 1.88 m (6' 2\")   Wt 77.1 kg (170 lb)   SpO2 95%   BMI 21.83 kg/m      Physical Exam:   CONSTITUTIONAL/GENERAL APPEARANCE:  NAD  EYES: EOMI, sclera anicteric, PERRLA  ENT/NECK: atraumatic, lips and oral mucous membranes dry  RESPIRATORY: non-labored breathing  MUSCULOSKELETAL/BACK/SPINE/EXTREMITIES: Moves all extremities purposefully.  No edema or obvious joint deformities. Right hip pain on palpation and with ROM.   NEURO: Alert and Oriented x3. Answers questions appropriately  SKIN/VASCULAR EXAM:  No jaundice, no visible rashes or lesions    Acute Inpatient Pain Service [facility]  Hours of pain coverage 24/7   Page via Amcom- Please Page the Pain Team Via Amcom: \"PAIN MANAGEMENT ACUTE INPATIENT/ [facility] [place]\"            "

## 2023-06-30 NOTE — ANESTHESIA PROCEDURE NOTES
Airway       Patient location during procedure: OR       Procedure Start/Stop Times: 6/30/2023 7:54 AM  Staff -        Anesthesiologist:  Gildardo Gordon MD       CRNA: Milli Jean-Baptiste APRN CRNA       Performed By: CRNA  Consent for Airway        Urgency: elective  Indications and Patient Condition       Indications for airway management: michael-procedural       Induction type:intravenous       Mask difficulty assessment: 2 - vent by mask + OA or adjuvant +/- NMBA    Final Airway Details       Final airway type: endotracheal airway       Successful airway: ETT - single and Oral  Endotracheal Airway Details        ETT size (mm): 8.0       Cuffed: yes       Cuff volume (mL): 8       Successful intubation technique: video laryngoscopy       VL Blade Size: Glidescope 3       Grade View of Cords: 1       Adjucts: stylet       Position: Right       Measured from: gums/teeth       Secured at (cm): 22       Bite block used: None    Post intubation assessment        Placement verified by: capnometry, equal breath sounds and chest rise        Number of attempts at approach: 1       Number of other approaches attempted: 0       Secured with: silk tape       Ease of procedure: easy       Dentition: Intact and Unchanged    Medication(s) Administered   Medication Administration Time: 6/30/2023 7:54 AM

## 2023-06-30 NOTE — PHARMACY
"The following home medications were NOT continued on inpatient admission per \"Discontinuation of nonessential home medications during hospitalization\" policy: alendronate     If a therapeutic holiday is deemed inappropriate per the prescriber, please notify the pharmacist regarding the medication order.    The pharmacist is available to answer any questions and/or concerns the patient may have regarding discontinuation of non-essential medications.    Please ensure that these medications are restarted as needed upon discharge via the medication reconciliation discharge process and included on the discharge medication reconciliation report.    Thank you,  Lokesh Hernandez RPH    "
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
## 2023-06-30 NOTE — MEDICATION SCRIBE - ADMISSION MEDICATION HISTORY
Medication Scribe Admission Medication History    Admission medication history is complete. The information provided in this note is only as accurate as the sources available at the time of the update.    Medication reconciliation/reorder completed by provider prior to medication history? No    Information Source(s): Patient via in-person    Pertinent Information: None    Changes made to PTA medication list:    Added: None    Deleted: None    Changed: None    Not Taking: calcium carbonate, cetirizine    Medication Affordability:  Not including over the counter (OTC) medications, was there a time in the past 3 months when you did not take your medications as prescribed because of cost?: No    Allergies reviewed with patient and updates made in EHR: yes    Medication History Completed By: Jojo Henriquez 6/29/2023 10:55 PM    Prior to Admission medications    Medication Sig Last Dose Taking? Auth Provider Long Term End Date   acetaminophen (TYLENOL) 325 MG tablet Take 2 tablets (650 mg) by mouth every 4 hours as needed for other (multimodal surgical pain management along with NSAIDS and opioid medication as indicated based on pain control and physical function.) More than a month at 1-2 years ago Yes Ana Maria Syed APRN CNP     alendronate (FOSAMAX) 70 MG tablet Take 1 tablet (70 mg) by mouth every 7 days Take on empty stomach for 30 min with full glass of water, dont lay down 6/24/2023 at saturday Yes Racheal Swift MD Yes    ammonium lactate (LAC-HYDRIN) 12 % external cream Apply topically 2 times daily as needed for dry skin Past Month at week ago Yes Brayan Mcclain DPM     ascorbic acid 500 MG TABS Take 1 tablet (500 mg) by mouth daily 6/28/2023 at pm Yes Racheal Swift MD     aspirin 81 MG EC tablet Take 81 mg by mouth daily 6/28/2023 at yesterday Yes Unknown, Entered By History No    clopidogrel (PLAVIX) 75 MG tablet Take 1 tablet (75 mg) by mouth daily 6/28/2023 at pm Yes Racheal Swift MD Yes     dulaglutide (TRULICITY) 1.5 MG/0.5ML pen Inject 1.5 mg Subcutaneous every 7 days 6/23/2023 at fri Yes Racheal Swift MD No    ferrous sulfate (FEROSUL) 325 (65 Fe) MG tablet Take 1 tablet (325 mg) by mouth daily (with breakfast) 6/28/2023 at pm Yes Racheal Swift MD No    gentamicin (GARAMYCIN) 0.1 % external cream Apply topically daily To left ulcers. Past Month at week ago Yes Brayan Mcclain DPM     insulin lispro (HUMALOG KWIKPEN) 100 UNIT/ML (1 unit dial) KWIKPEN Inject 3-4 Units Subcutaneous 3 times daily (before meals) 6/29/2023 at 1600 #1 unit Yes Racheal Swift MD Yes    ketoconazole (NIZORAL) 2 % external shampoo Apply topically twice a week Leave on for 3-5 min then rinse off; after 2-4 weeks use only once weekly More than a month at awhile Yes Racheal Swift MD     lisinopril (ZESTRIL) 5 MG tablet Take 2 tablets (10 mg) by mouth daily 6/27/2023 at tues Yes Racheal Swift MD Yes    silver sulfADIAZINE (SSD) 1 % external cream Apply topically to the affected area on right foot and leg as directed. Past Month at week ago Yes Racheal Swift MD     simvastatin (ZOCOR) 40 MG tablet Take 1 tablet (40 mg) by mouth At Bedtime 6/28/2023 at pm Yes Racheal Swift MD Yes    triamcinolone (KENALOG) 0.1 % external cream Apply topically daily To affected areas on feet and legs. More than a month at long time Yes Brayan Mcclain DPM     triamcinolone (KENALOG) 0.1 % external cream Apply to arms twice daily for 14 days, then use twice daily 2 times per week only More than a month at long time Yes Racheal Swift MD     VITAMIN D, CHOLECALCIFEROL, PO Take 1,000 Units by mouth 2 times daily 6/28/2023 at pm Yes Unknown, Entered By History     calcium carbonate (OS-CLAUDIA) 500 MG tablet Take 1 tablet by mouth 2 times daily   Reported, Patient     cetirizine (ZYRTEC) 10 MG tablet Take 1 tablet (10 mg) by mouth daily   Racheal Swift MD     Continuous Blood Gluc  ([employer] 14 DAY READER) TANIA 1 Device every  hour as needed (every hour prn)   Racheal Swift MD     Continuous Blood Gluc Sensor (FREESTYLE LATOYA 14 DAY SENSOR) MISC REPLACE SENSOR EVERY 14 DAYS   Racheal Swift MD     insulin pen needle (B-D U/F) 31G X 8 MM miscellaneous USE AS DIRECTED TO TEST FOUR TIMES DAILY   Racheal Swift MD

## 2023-06-30 NOTE — ANESTHESIA CARE TRANSFER NOTE
Patient: Amos Walker    Procedure: Procedure(s):  Hemiarthroplasty Hip       Diagnosis: Femur fracture, right (H) [S72.91XA]  Diagnosis Additional Information: No value filed.    Anesthesia Type:   General     Note:    Oropharynx: oropharynx clear of all foreign objects and spontaneously breathing  Level of Consciousness: awake and drowsy  Oxygen Supplementation: face mask  Level of Supplemental Oxygen (L/min / FiO2): 6  Independent Airway: airway patency satisfactory and stable  Dentition: dentition unchanged  Vital Signs Stable: post-procedure vital signs reviewed and stable  Report to RN Given: handoff report given  Patient transferred to: PACU    Handoff Report: Identifed the Patient, Identified the Reponsible Provider, Reviewed the pertinent medical history, Discussed the surgical course, Reviewed Intra-OP anesthesia mangement and issues during anesthesia, Set expectations for post-procedure period and Allowed opportunity for questions and acknowledgement of understanding      Vitals:  Vitals Value Taken Time   /59 06/30/23 1002   Temp 37.7    Pulse 86 06/30/23 1011   Resp 17 06/30/23 1011   SpO2 100 % 06/30/23 1011   Vitals shown include unvalidated device data.    Electronically Signed By: LEW Ragsdale CRNA  June 30, 2023  10:12 AM

## 2023-06-30 NOTE — ADDENDUM NOTE
Addendum  created 06/30/23 1131 by Keith Simon APRN CRNA    Intraprocedure Event edited, Intraprocedure Staff edited

## 2023-07-01 ENCOUNTER — APPOINTMENT (OUTPATIENT)
Dept: PHYSICAL THERAPY | Facility: CLINIC | Age: 78
DRG: 522 | End: 2023-07-01
Attending: PEDIATRICS
Payer: COMMERCIAL

## 2023-07-01 LAB
ALBUMIN SERPL BCG-MCNC: 3.1 G/DL (ref 3.5–5.2)
ANION GAP SERPL CALCULATED.3IONS-SCNC: 9 MMOL/L (ref 7–15)
BUN SERPL-MCNC: 23.5 MG/DL (ref 8–23)
CALCIUM SERPL-MCNC: 8.3 MG/DL (ref 8.8–10.2)
CHLORIDE SERPL-SCNC: 102 MMOL/L (ref 98–107)
CREAT SERPL-MCNC: 1.24 MG/DL (ref 0.67–1.17)
DEPRECATED HCO3 PLAS-SCNC: 23 MMOL/L (ref 22–29)
ERYTHROCYTE [DISTWIDTH] IN BLOOD BY AUTOMATED COUNT: 12.7 % (ref 10–15)
GFR SERPL CREATININE-BSD FRML MDRD: 60 ML/MIN/1.73M2
GLUCOSE BLDC GLUCOMTR-MCNC: 137 MG/DL (ref 70–99)
GLUCOSE BLDC GLUCOMTR-MCNC: 158 MG/DL (ref 70–99)
GLUCOSE BLDC GLUCOMTR-MCNC: 171 MG/DL (ref 70–99)
GLUCOSE BLDC GLUCOMTR-MCNC: 195 MG/DL (ref 70–99)
GLUCOSE BLDC GLUCOMTR-MCNC: 233 MG/DL (ref 70–99)
GLUCOSE SERPL-MCNC: 138 MG/DL (ref 70–99)
HCT VFR BLD AUTO: 31 % (ref 40–53)
HGB BLD-MCNC: 10.3 G/DL (ref 13.3–17.7)
MAGNESIUM SERPL-MCNC: 2 MG/DL (ref 1.7–2.3)
MCH RBC QN AUTO: 32 PG (ref 26.5–33)
MCHC RBC AUTO-ENTMCNC: 33.2 G/DL (ref 31.5–36.5)
MCV RBC AUTO: 96 FL (ref 78–100)
PHOSPHATE SERPL-MCNC: 2.6 MG/DL (ref 2.5–4.5)
PLATELET # BLD AUTO: 127 10E3/UL (ref 150–450)
POTASSIUM SERPL-SCNC: 4.6 MMOL/L (ref 3.4–5.3)
RBC # BLD AUTO: 3.22 10E6/UL (ref 4.4–5.9)
SODIUM SERPL-SCNC: 134 MMOL/L (ref 136–145)
WBC # BLD AUTO: 6.3 10E3/UL (ref 4–11)

## 2023-07-01 PROCEDURE — 120N000002 HC R&B MED SURG/OB UMMC

## 2023-07-01 PROCEDURE — 36415 COLL VENOUS BLD VENIPUNCTURE: CPT | Performed by: STUDENT IN AN ORGANIZED HEALTH CARE EDUCATION/TRAINING PROGRAM

## 2023-07-01 PROCEDURE — 250N000011 HC RX IP 250 OP 636: Performed by: STUDENT IN AN ORGANIZED HEALTH CARE EDUCATION/TRAINING PROGRAM

## 2023-07-01 PROCEDURE — 250N000013 HC RX MED GY IP 250 OP 250 PS 637: Performed by: STUDENT IN AN ORGANIZED HEALTH CARE EDUCATION/TRAINING PROGRAM

## 2023-07-01 PROCEDURE — 97530 THERAPEUTIC ACTIVITIES: CPT | Mod: GP

## 2023-07-01 PROCEDURE — 80069 RENAL FUNCTION PANEL: CPT | Performed by: STUDENT IN AN ORGANIZED HEALTH CARE EDUCATION/TRAINING PROGRAM

## 2023-07-01 PROCEDURE — 97161 PT EVAL LOW COMPLEX 20 MIN: CPT | Mod: GP

## 2023-07-01 PROCEDURE — 99232 SBSQ HOSP IP/OBS MODERATE 35: CPT | Performed by: INTERNAL MEDICINE

## 2023-07-01 PROCEDURE — 83735 ASSAY OF MAGNESIUM: CPT | Performed by: STUDENT IN AN ORGANIZED HEALTH CARE EDUCATION/TRAINING PROGRAM

## 2023-07-01 PROCEDURE — 97110 THERAPEUTIC EXERCISES: CPT | Mod: GP

## 2023-07-01 PROCEDURE — 85027 COMPLETE CBC AUTOMATED: CPT | Performed by: STUDENT IN AN ORGANIZED HEALTH CARE EDUCATION/TRAINING PROGRAM

## 2023-07-01 RX ADMIN — INSULIN ASPART 2 UNITS: 100 INJECTION, SOLUTION INTRAVENOUS; SUBCUTANEOUS at 17:42

## 2023-07-01 RX ADMIN — ACETAMINOPHEN 975 MG: 325 TABLET, FILM COATED ORAL at 06:12

## 2023-07-01 RX ADMIN — OXYCODONE HYDROCHLORIDE AND ACETAMINOPHEN 500 MG: 500 TABLET ORAL at 08:33

## 2023-07-01 RX ADMIN — SENNOSIDES AND DOCUSATE SODIUM 1 TABLET: 50; 8.6 TABLET ORAL at 21:17

## 2023-07-01 RX ADMIN — CEFAZOLIN 1 G: 1 INJECTION, POWDER, FOR SOLUTION INTRAMUSCULAR; INTRAVENOUS at 02:45

## 2023-07-01 RX ADMIN — Medication 25 MCG: at 08:33

## 2023-07-01 RX ADMIN — CALCIUM 500 MG: 500 TABLET ORAL at 21:16

## 2023-07-01 RX ADMIN — SIMVASTATIN 40 MG: 40 TABLET, FILM COATED ORAL at 21:17

## 2023-07-01 RX ADMIN — SENNOSIDES AND DOCUSATE SODIUM 1 TABLET: 50; 8.6 TABLET ORAL at 08:33

## 2023-07-01 RX ADMIN — CALCIUM 500 MG: 500 TABLET ORAL at 08:32

## 2023-07-01 RX ADMIN — INSULIN ASPART 1 UNITS: 100 INJECTION, SOLUTION INTRAVENOUS; SUBCUTANEOUS at 13:32

## 2023-07-01 RX ADMIN — ACETAMINOPHEN 975 MG: 325 TABLET, FILM COATED ORAL at 21:17

## 2023-07-01 RX ADMIN — Medication 25 MCG: at 21:17

## 2023-07-01 RX ADMIN — POLYETHYLENE GLYCOL 3350 17 G: 17 POWDER, FOR SOLUTION ORAL at 08:33

## 2023-07-01 RX ADMIN — CETIRIZINE HYDROCHLORIDE 10 MG: 10 TABLET, FILM COATED ORAL at 08:33

## 2023-07-01 RX ADMIN — ACETAMINOPHEN 975 MG: 325 TABLET, FILM COATED ORAL at 13:43

## 2023-07-01 ASSESSMENT — ACTIVITIES OF DAILY LIVING (ADL)
ADLS_ACUITY_SCORE: 35
ADLS_ACUITY_SCORE: 32
ADLS_ACUITY_SCORE: 35
ADLS_ACUITY_SCORE: 32
ADLS_ACUITY_SCORE: 35
ADLS_ACUITY_SCORE: 32
ADLS_ACUITY_SCORE: 35
ADLS_ACUITY_SCORE: 32

## 2023-07-01 NOTE — PROGRESS NOTES
[facility]    Medicine Progress Note - Hospitalist Service, GOLD TEAM 19    Date of Admission:  6/29/2023    Assessment & Plan      Amos Walker is a 78 year old male who presented after ground level fall for right hip pain, XR demonstrates fracture. Patient has history of significant LE vascular disease and neuropathy, noted in several outpatient providers' documentation that he feels unsteady at times. History of angioplasty for poorly healing wounds on LLE, takes plavix every day and aspirin every other day. History of charcot foot on right that impairs his balance a bit, walks with a cane as needed.         Hip fracture (right) - preop assessment  -  Status post  Right hip hemiarthroplasty 6/30/23   - was ok to resume PTA plavix and aspirin on post operative day 1 by orthopedic surgery     Bracing/Splinting: abduction pillow to be worn while in bed   Wound Care: keep dressing c/d/i x 7 days.   Follow-up: Clinic with Dr. Keller in 2 weeks (on or around 7/14) with no x-rays needed.    Acute blood loss anemia on chronic anemia   - post surgery- transfuse if Hg < 7   - restart iron     thrombocytopenia  - plt count was 153 on admission , currently at 123, monitor     Hypocalcemia and Hyponatremia  -Na now at 134, monitor   - replace ca as needed     T2DM complicated by neuropathy and CKD - last A1C was 6.1 in April  - resume home dulaglutide (weekly) and start correction dose insulin  - will order 3 unit(s) TID w meals; says he usually does 6-7 with bfast, can correct if runs high     HTN  benign essential  - lisinopril 10 mg daily, currently on hold, restat as blood pressure  Allows and creatinine stable       CAD s/p MARCELL  3/18  To mid/prox LAD, LM and mid /prox and ostial RCA     PAD s/p angioplasty left SFA and popliteal arteries 2021    venous insufficiency   - patient on dual antiplatelet with aspirin (every other day) and plavix daily, last took both evening of  6/28  - back on aspirin, restart  plavix 7/2 ( was ok to resume per orthopedic surgery )   - resume home statin  - follow up  With cardiology , I could not find echo reports      CKD II with last eGFR from 6/29 70 mL/min  - continue ace inh, avoid hypotension  - continue to hold Lisinopril      Emphysema with normal PFT's (2020) due to Tobacco Use Disorder  - not on inhalers, do incentive spirometry after surgery and can provide prn albuterol as needed        MGUS  - follows with heme/onc as out patient       Osteoporosis on alendronate weekly  - not sure what day his dose falls on, can order when required, 70 mg  - can reorder home vit d and calcium       Hyperlipidemia  - continue statins         Diet: Advance Diet as Tolerated: Regular Diet Adult    DVT Prophylaxis: aspirin, plavix   Daniel Catheter: Not present  Lines: None     Cardiac Monitoring: None  Code Status: Full Code      Clinically Significant Risk Factors              # Hypoalbuminemia: Lowest albumin = 3.1 g/dL at 7/1/2023  6:38 AM, will monitor as appropriate  # Coagulation Defect: INR = 1.22 (Ref range: 0.85 - 1.15) and/or PTT = N/A, will monitor for bleeding  # Thrombocytopenia: Lowest platelets = 127 in last 2 days, will monitor for bleeding   # Hypertension: Noted on problem list                 Disposition Plan             Georgina Saunders MD  Hospitalist Service, GOLD TEAM 19  [facility]  Securely message with [email or web address] (more info)  Text page via [facility] Paging/Directory   See signed in provider for up to date coverage information  ______________________________________________________________________    Interval History   Doing ok, denies chest pain  No shortness of breath  No nausea vomiting     Physical Exam   Vital Signs: Temp: 99.7  F (37.6  C) Temp src: Oral BP: (!) 142/60 Pulse: 94   Resp: 16 SpO2: 91 % O2 Device: Nasal cannula Oxygen Delivery: 1/2 LPM  Weight: 170 lbs 0 oz  General appearance:  awake alert in  no apparent distress     HEENT: EOMI and PEARLA, sclera nonicteric, moist mucus membranes, head atraumatic  NECK: supple  RESPIRATORY: lungs are clear   CARDIOVASCULAR: normal S1S2 regular rate and rhythm  GASTROINTESTINAL: abdomen is  soft,  non-distended, non-tender with normal bowel sounds.    masses felt, no hepatosplenomegaly noted    MUSCULOSKELETAL:abductor pillow in place   SKIN: warm and dry, no rashes, no mottling noted   NEUROLOGIC: awake alert and oriented   EXTREMITIES: no clubbing cyanosis or edema   Data     I have personally reviewed the following data over the past 24 hrs:    6.3  \   10.3 (L)   / 127 (L)     134 (L) 102 23.5 (H) /  138 (H)   4.6 23 1.24 (H) \       ALT: N/A AST: N/A AP: N/A TBILI: N/A   ALB: 3.1 (L) TOT PROTEIN: N/A LIPASE: N/A       Imaging results reviewed over the past 24 hrs:   Recent Results (from the past 24 hour(s))   XR Pelvis w Hip Port Right 1 View    Narrative    EXAM: XR PELVIS AND HIP PORTABLE RIGHT 1 VIEW  LOCATION: [facility]  DATE: 6/30/2023    INDICATION: Status post Hip surgery.  COMPARISON: 06/29/2023.      Impression    IMPRESSION: Bilateral total hip replacements. Dedicated view of the right hip show changes of a recent hip replacement. No acute fracture or dislocation. Atherosclerotic vascular calcifications. Bones are demineralized.

## 2023-07-01 NOTE — PROGRESS NOTES
07/01/23 1035   Appointment Info   Signing Clinician's Name / Credentials (PT) GREGORY Veloz   Student Supervision On-site supervision provided;Direct supervision provided;Line of sight supervision provided;Therapy services provided with the co-signing licensed therapist guiding and directing the services, and providing the skilled judgement and assessment throughout the session       Present no   Language English   Living Environment   People in Home spouse;child(daisha), adult   Current Living Arrangements house   Home Accessibility stairs to enter home   Number of Stairs, Main Entrance 3   Stair Railings, Main Entrance railing on right side (ascending)   Transportation Anticipated family or friend will provide   Living Environment Comments Daughter works full time, spouse has arthritis, pt. needs to be mostly independent   Self-Care   Usual Activity Tolerance moderate   Current Activity Tolerance fair   Regular Exercise Yes   Activity/Exercise Type other (see comments)  (OT for feet, hands)   Exercise Amount/Frequency   (started 2 weeks ago)   Equipment Currently Used at Home cane, straight  (owns FWW)   Fall history within last six months yes   Number of times patient has fallen within last six months 1   Activity/Exercise/Self-Care Comment Pt. reports he was independent in ADLs at baseline   General Information   Onset of Illness/Injury or Date of Surgery 06/30/23   Referring Physician Shaun Lemus MD   Patient/Family Therapy Goals Statement (PT) improve activity tolerance, try to avoid tripping   Pertinent History of Current Problem (include personal factors and/or comorbidities that impact the POC) 78 year old male who sustained a right femoral neck fracture now s/p right hip hemiarthroplasty on 6/30 with Dr. Keller.   Existing Precautions/Restrictions fall;no hip IR;no hip ADD past midline;90 degree hip flexion;no pivoting or twisting   Weight-Bearing Status - LUE full  weight-bearing   Weight-Bearing Status - RUE full weight-bearing   Weight-Bearing Status - LLE full weight-bearing   Weight-Bearing Status - RLE weight-bearing as tolerated   Cognition   Affect/Mental Status (Cognition) WNL   Orientation Status (Cognition) oriented x 3   Follows Commands (Cognition) WNL   Pain Assessment   Patient Currently in Pain Yes, see Vital Sign flowsheet   Integumentary/Edema   Integumentary/Edema no deficits were identifed   Posture    Posture Forward head position;Protracted shoulders;Kyphosis   Range of Motion (ROM)   Range of Motion ROM deficits secondary to surgical procedure;ROM deficits secondary to pain   ROM Comment R hip ROM about 50 degrees visual estimate when pt. performs heel slides independently.   Strength (Manual Muscle Testing)   Strength (Manual Muscle Testing) strength is WFL   Bed Mobility   Comment, (Bed Mobility) Pt. is SBA for supine<>sit transfer   Transfers   Comment, (Transfers) Pt. is Karan for sit<>stand transfer   Gait/Stairs (Locomotion)   Comment, (Gait/Stairs) Pt. ambulates with walker, CGA   Balance   Balance other (describe)   Balance Comments independent in sitting balance, static and dynamic standing balance impacted by R charcot foot   Sensory Examination   Sensory Perception other (describe)   Sensory Perception Comments neuropathy at baseline, reports R foot has difficult perception   Clinical Impression   Criteria for Skilled Therapeutic Intervention Yes, treatment indicated   PT Diagnosis (PT) impaired functional mobility   Influenced by the following impairments increased post-op pain, precautions   Functional limitations due to impairments impaired bed mobility, transfers, gait   Clinical Presentation (PT Evaluation Complexity) Stable/Uncomplicated   Clinical Presentation Rationale per clinical judgement   Clinical Decision Making (Complexity) low complexity   Planned Therapy Interventions (PT) bed mobility training;balance training;gait  training;home exercise program;patient/family education;strengthening;stair training;transfer training;progressive activity/exercise;risk factor education;home program guidelines   Risk & Benefits of therapy have been explained evaluation/treatment results reviewed;risks/benefits reviewed;care plan/treatment goals reviewed;current/potential barriers reviewed   PT Total Evaluation Time   PT Eval, Low Complexity Minutes (79148) 10   Plan of Care Review   Plan of Care Reviewed With patient   Physical Therapy Goals   PT Frequency 2x/day   PT Predicted Duration/Target Date for Goal Attainment 07/05/23   PT Goals Bed Mobility;Transfers;Gait;Stairs   PT: Bed Mobility Independent;Supine to/from sit;Within precautions   PT: Transfers Modified independent;Assistive device;Bed to/from chair;Sit to/from stand;Within precautions   PT: Gait Modified independent;Assistive device;150 feet   PT: Stairs 3 stairs;Rail on right;Modified independent;Assistive device   Therapeutic Procedure/Exercise   Ther. Procedure: strength, endurance, ROM, flexibillity Minutes (00384) 10   Treatment Detail/Skilled Intervention Pt. was received supine in bed, agreeable to participating in PT session. Pt. instructed in HEP including: AP, GS, QS, HS, heel slides, SAQ for strengthening and acitivity tolerance. Pt. instructed to complete bid, completes x10 each exercise this session with proper form. Good tolerance overall.   Therapeutic Activity   Therapeutic Activities: dynamic activities to improve functional performance Minutes (10061) 11   Treatment Detail/Skilled Intervention Pt. educated in WB status (WBAT) and posterior hip precautions. Pt. demonstrates understanding, opportunity for questions offered. Pt. then performs supine to sit transfer with verbal cues for maintaining hip precautions, SBA. Pt. performs sit to stand transfer with Karan, walker. Pt is cued for using bed as push-off surface vs. walker for transfer stability.  Pt. stands EOB, no  c/o dizziness, lightheadedness. Pt. then ambulates with walker, CGA in hallway about 60' this session. Upon return to room pt. instructed in lateral steps towards HOB. Pt. then completes stand to sit transfer with CGA, walker. Pt. performs sit to supine transfer with SBA. Pt. does well maintaining his precautions with this transfer. Pt. notes he does not feel safe mobilizing yet and believes he may need TCU. Pt. educated in that everyone is different, no typical LOS, and he is moving well. Pt. does not want to return home with walker, feels he needs to increase activity tolerance before going home. Pt. then ends session supine in bed with all needs met and call light in reach.   PT Discharge Planning   PT Plan Flat bed mobility, transfers, gait with walker; progress to stairs per home setup   PT Discharge Recommendation (DC Rec) home with assist;Transitional Care Facility   PT Rationale for DC Rec Recommend dual discharge planning based on this session. Pt. moving well but has not yet completed stairs and is primary caregiver for wife. Pt. needs to be mostly independent to discharge home. However, pending length of stay and progress in PT, anticipate safe discharge to home with assist.   PT Brief overview of current status Pt. is SBA for bed mobility, CGA with walker for all other mobility.   Total Session Time   Timed Code Treatment Minutes 21   Total Session Time (sum of timed and untimed services) 31

## 2023-07-01 NOTE — PROGRESS NOTES
Pt is alert&ox4, call light within reach. Able to make needs known. Pt denies SOB, chest pain, n/v, and dizziness. Pt has baseline numbness BUE& BLE.     LBM 6/27, pt passing gas. Voids spontaneously using urinal at bedside.     Up with A2 w/ walker and gait belt     On sliding scale and carb coverage.     Pt is hard of hearing, has hearing aids.     Ulcer on left ankle, dressing change  M-W-F.     Dressing c/d/i on surgical incision     Pt pain is managed with tylenol.     L&R piv SL   Regular diet   Additional details:  Iso:contact     P:continue with plan of care

## 2023-07-01 NOTE — PROGRESS NOTES
Orthopaedic Surgery Progress Note 07/01/2023    S: No acute events overnight.  Pain  controlled. Denies numbness or tingling. Denies chest pain, SOB, nausea/vomiting. Tolerating liquids. Has not had solid food yet. Unsure if passing gas. No BM Voiding spontaneously.  Ambulated overnight.     O:  Temp: 97.6  F (36.4  C) Temp src: Oral BP: 128/54 Pulse: 84   Resp: 16 SpO2: 96 % O2 Device: None (Room air) Oxygen Delivery: 1/2 LPM    Exam:  Gen: No acute distress, resting comfortably in bed.  Resp: Non-labored breathing  MSK:  RLE:  - Dressings c/d/i  - SILT tibial/sural/saphenous/DP/SP nerves  - Fires Quad, TA, EHL, FHL, GaSC  - PT/DP pulses 2+, foot wwp    Recent Labs   Lab 07/01/23  0638 06/30/23  0622 06/29/23  2131   WBC 6.3 6.8 9.3   HGB 10.3* 12.4* 12.3*   * 132* 153       Assessment: Amos Walker is a 78 year old male who sustained a right femoral neck fracture now s/p right hip hemiarthroplasty on 6/30 with Dr. Keller.      Plan:  Medicine Primary  Activity: Up with assist, posterior hip precautions   Weight bearing status: WBAT RLE.  Antibiotics: continue periop Ancef x 24 hours  Diet: Begin with clear fluids and progress diet as tolerated.  DVT prophylaxis: Ok to resume PTA aspirin and plavix on POD1 from orthopedic perspective, will defer to medicine team.  Mechanical while in the hospital.  Bracing/Splinting: abduction pillow to be worn while in bed   Wound Care: keep dressing c/d/i x 7 days.   Pain management: utilize all oral meds first, utilize IV meds for severe breakthrough pain after PO meds given adequate time to take effect  X-rays: Complete  Physical Therapy: eval and treat  Occupational Therapy: eval and treat   Labs: Trend Hgb on POD #1, 2, 3   Disposition: Pending progress with therapies, pain control on orals, and medical stability,  Follow-up: Clinic with Dr. Keller in 2 weeks (on or around 7/14) with no x-rays needed.     Forrest Eduardo MD      Patient called for refill request and was advised it won't be taken care of until Monday

## 2023-07-01 NOTE — PROGRESS NOTES
VS: VSS and afebrile   O2: Sats >92% on RA.  Able to use IS appropriately   Output: Voids small amount and frequently in urinal.   Last BM: , passing gas   Activity: UP with A2,gaitbelt and walker   Skin: R hip surgical incision,ulcer on L ankle and 2nd toe.    Pain: Tolerable with schedule Tylenol.    Neuro: Alert and oriented x4.  Upper extremities: Intermittent numbness  Lower extremities: baseline neuropathy   Dressing: Aquacel: CDI  L foot ulcer: CDI, Seen by WOC: dressing changed MWF.    Diet: Regular.    LDA: PIV: L and R hand: SL    Equipment: PCD,Capno,  IV pole, walker, hearing aide, ring, cell phone    Plan: Pain control, ambulation    Additional Info: Hard of hearing: wears hearing aid  Bed alarm on.     B and 171

## 2023-07-01 NOTE — PLAN OF CARE
"  VS: /58 (BP Location: Left arm)   Pulse 88   Temp 99.5  F (37.5  C) (Oral)   Resp 16   Ht 1.88 m (6' 2\")   Wt 77.1 kg (170 lb)   SpO2 98%   BMI 21.83 kg/m      O2: Stable on room air>90%. Denies SOB or chest pain    Output: Using urinal at bedside.   Last BM: 6/27/23. Passing flatus   Activity: Assist of 2 with walker and gait belt with ambulation. Ambulated to the hallway   Skin: Right hip surgical incision  Left ankle wound, scab to the left ear, bruises to bilat arms     Pain: 4/10  pain managed with scheduled Tylenol    CMS: AXO  X4, baseline neuropathy    Dressing: Right hip-CDI  Mepilex to both heels.  L foot ankle ulcer- CDI. Orders to change MON/WED/FRI   Diet: Reg diet. Carb count insulin   LDA: Right and left PIV- Saline locked   Equipment: Patient belongings   Plan: Count with POC   Additional Info: Hearing aids. Contact precaution maintained                          "

## 2023-07-01 NOTE — PLAN OF CARE
VS:     Pt A/O X 4. Afebrile. VS and Capno stable on RA. Lungs- expiratory wheezes. Denies nausea, shortness of breath, and chest pain.     Output:     Bowels- active in all four quadrants. Last BM 6/28. Frequency and urgency with urination, bedside urinal present.     Activity:     Pt up with 2x assist with walker and GB.     Skin: Surgical incision to R hip, scabs to R shin and L ear, previous wound to L ankle.     Pain:     Denied pain and given tylenol, ICE applied, and is tolerating well.      CMS:     Baseline numbness in all extremities.      Dressing:     Surgical incisional dressing is CDI.   Wound to L ankle changed per WOC, dressing changes M, W, F and PRN.      Diet:     Pt is on a regular diet and appetite was good this shift. BG's 194 and 196.      LDA:     PIV is patent in L forearm and is infusing LR at 75 mL/hr.  PIV is patent in the R forearm and SL.      Equipment:     IV pole, abductor pillow, PCD's, walker and GB.     Plan:     Ortho and IM following. Pt is able to make needs known and the call light is within the pt's reach. Continue to monitor.       Additional Info:

## 2023-07-02 ENCOUNTER — APPOINTMENT (OUTPATIENT)
Dept: OCCUPATIONAL THERAPY | Facility: CLINIC | Age: 78
DRG: 522 | End: 2023-07-02
Payer: COMMERCIAL

## 2023-07-02 ENCOUNTER — APPOINTMENT (OUTPATIENT)
Dept: PHYSICAL THERAPY | Facility: CLINIC | Age: 78
DRG: 522 | End: 2023-07-02
Payer: COMMERCIAL

## 2023-07-02 LAB
ALBUMIN SERPL BCG-MCNC: 3 G/DL (ref 3.5–5.2)
ANION GAP SERPL CALCULATED.3IONS-SCNC: 11 MMOL/L (ref 7–15)
BASOPHILS # BLD AUTO: 0 10E3/UL (ref 0–0.2)
BASOPHILS NFR BLD AUTO: 1 %
BUN SERPL-MCNC: 19.6 MG/DL (ref 8–23)
CALCIUM SERPL-MCNC: 8.7 MG/DL (ref 8.8–10.2)
CHLORIDE SERPL-SCNC: 103 MMOL/L (ref 98–107)
CREAT SERPL-MCNC: 0.99 MG/DL (ref 0.67–1.17)
DEPRECATED HCO3 PLAS-SCNC: 23 MMOL/L (ref 22–29)
EOSINOPHIL # BLD AUTO: 0.3 10E3/UL (ref 0–0.7)
EOSINOPHIL NFR BLD AUTO: 5 %
ERYTHROCYTE [DISTWIDTH] IN BLOOD BY AUTOMATED COUNT: 12.6 % (ref 10–15)
GFR SERPL CREATININE-BSD FRML MDRD: 79 ML/MIN/1.73M2
GLUCOSE BLDC GLUCOMTR-MCNC: 150 MG/DL (ref 70–99)
GLUCOSE BLDC GLUCOMTR-MCNC: 171 MG/DL (ref 70–99)
GLUCOSE BLDC GLUCOMTR-MCNC: 173 MG/DL (ref 70–99)
GLUCOSE BLDC GLUCOMTR-MCNC: 181 MG/DL (ref 70–99)
GLUCOSE BLDC GLUCOMTR-MCNC: 202 MG/DL (ref 70–99)
GLUCOSE BLDC GLUCOMTR-MCNC: 221 MG/DL (ref 70–99)
GLUCOSE SERPL-MCNC: 179 MG/DL (ref 70–99)
HCT VFR BLD AUTO: 29.8 % (ref 40–53)
HGB BLD-MCNC: 10.2 G/DL (ref 13.3–17.7)
IMM GRANULOCYTES # BLD: 0.1 10E3/UL
IMM GRANULOCYTES NFR BLD: 1 %
LYMPHOCYTES # BLD AUTO: 0.9 10E3/UL (ref 0.8–5.3)
LYMPHOCYTES NFR BLD AUTO: 15 %
MAGNESIUM SERPL-MCNC: 1.7 MG/DL (ref 1.7–2.3)
MCH RBC QN AUTO: 32.6 PG (ref 26.5–33)
MCHC RBC AUTO-ENTMCNC: 34.2 G/DL (ref 31.5–36.5)
MCV RBC AUTO: 95 FL (ref 78–100)
MONOCYTES # BLD AUTO: 0.7 10E3/UL (ref 0–1.3)
MONOCYTES NFR BLD AUTO: 11 %
NEUTROPHILS # BLD AUTO: 3.9 10E3/UL (ref 1.6–8.3)
NEUTROPHILS NFR BLD AUTO: 67 %
NRBC # BLD AUTO: 0 10E3/UL
NRBC BLD AUTO-RTO: 0 /100
PHOSPHATE SERPL-MCNC: 2.4 MG/DL (ref 2.5–4.5)
PLATELET # BLD AUTO: 127 10E3/UL (ref 150–450)
POTASSIUM SERPL-SCNC: 3.9 MMOL/L (ref 3.4–5.3)
RBC # BLD AUTO: 3.13 10E6/UL (ref 4.4–5.9)
SODIUM SERPL-SCNC: 137 MMOL/L (ref 136–145)
WBC # BLD AUTO: 5.8 10E3/UL (ref 4–11)

## 2023-07-02 PROCEDURE — 250N000013 HC RX MED GY IP 250 OP 250 PS 637: Performed by: STUDENT IN AN ORGANIZED HEALTH CARE EDUCATION/TRAINING PROGRAM

## 2023-07-02 PROCEDURE — 120N000002 HC R&B MED SURG/OB UMMC

## 2023-07-02 PROCEDURE — 84100 ASSAY OF PHOSPHORUS: CPT | Performed by: STUDENT IN AN ORGANIZED HEALTH CARE EDUCATION/TRAINING PROGRAM

## 2023-07-02 PROCEDURE — 250N000013 HC RX MED GY IP 250 OP 250 PS 637: Performed by: INTERNAL MEDICINE

## 2023-07-02 PROCEDURE — 85025 COMPLETE CBC W/AUTO DIFF WBC: CPT | Performed by: INTERNAL MEDICINE

## 2023-07-02 PROCEDURE — 97116 GAIT TRAINING THERAPY: CPT | Mod: GP

## 2023-07-02 PROCEDURE — 36415 COLL VENOUS BLD VENIPUNCTURE: CPT | Performed by: INTERNAL MEDICINE

## 2023-07-02 PROCEDURE — 97165 OT EVAL LOW COMPLEX 30 MIN: CPT | Mod: GO

## 2023-07-02 PROCEDURE — 97530 THERAPEUTIC ACTIVITIES: CPT | Mod: GP

## 2023-07-02 PROCEDURE — 99232 SBSQ HOSP IP/OBS MODERATE 35: CPT | Performed by: INTERNAL MEDICINE

## 2023-07-02 PROCEDURE — 83735 ASSAY OF MAGNESIUM: CPT | Performed by: STUDENT IN AN ORGANIZED HEALTH CARE EDUCATION/TRAINING PROGRAM

## 2023-07-02 PROCEDURE — 97535 SELF CARE MNGMENT TRAINING: CPT | Mod: GO

## 2023-07-02 RX ADMIN — ACETAMINOPHEN 975 MG: 325 TABLET, FILM COATED ORAL at 22:17

## 2023-07-02 RX ADMIN — INSULIN ASPART 1 UNITS: 100 INJECTION, SOLUTION INTRAVENOUS; SUBCUTANEOUS at 13:45

## 2023-07-02 RX ADMIN — INSULIN ASPART 1 UNITS: 100 INJECTION, SOLUTION INTRAVENOUS; SUBCUTANEOUS at 08:22

## 2023-07-02 RX ADMIN — INSULIN ASPART 1 UNITS: 100 INJECTION, SOLUTION INTRAVENOUS; SUBCUTANEOUS at 18:22

## 2023-07-02 RX ADMIN — CETIRIZINE HYDROCHLORIDE 10 MG: 10 TABLET, FILM COATED ORAL at 10:19

## 2023-07-02 RX ADMIN — ASPIRIN 81 MG: 81 TABLET, COATED ORAL at 10:19

## 2023-07-02 RX ADMIN — ACETAMINOPHEN 975 MG: 325 TABLET, FILM COATED ORAL at 05:11

## 2023-07-02 RX ADMIN — ACETAMINOPHEN 975 MG: 325 TABLET, FILM COATED ORAL at 13:44

## 2023-07-02 RX ADMIN — SENNOSIDES AND DOCUSATE SODIUM 1 TABLET: 50; 8.6 TABLET ORAL at 10:19

## 2023-07-02 RX ADMIN — CLOPIDOGREL BISULFATE 75 MG: 75 TABLET, FILM COATED ORAL at 10:19

## 2023-07-02 RX ADMIN — Medication 25 MCG: at 10:18

## 2023-07-02 RX ADMIN — POLYETHYLENE GLYCOL 3350 17 G: 17 POWDER, FOR SOLUTION ORAL at 10:19

## 2023-07-02 RX ADMIN — Medication 25 MCG: at 20:15

## 2023-07-02 RX ADMIN — SIMVASTATIN 40 MG: 40 TABLET, FILM COATED ORAL at 22:17

## 2023-07-02 RX ADMIN — CALCIUM 500 MG: 500 TABLET ORAL at 20:15

## 2023-07-02 RX ADMIN — OXYCODONE HYDROCHLORIDE AND ACETAMINOPHEN 500 MG: 500 TABLET ORAL at 10:19

## 2023-07-02 RX ADMIN — CALCIUM 500 MG: 500 TABLET ORAL at 10:18

## 2023-07-02 RX ADMIN — SENNOSIDES AND DOCUSATE SODIUM 1 TABLET: 50; 8.6 TABLET ORAL at 20:15

## 2023-07-02 ASSESSMENT — ACTIVITIES OF DAILY LIVING (ADL)
ADLS_ACUITY_SCORE: 35
ADLS_ACUITY_SCORE: 32
ADLS_ACUITY_SCORE: 32
ADLS_ACUITY_SCORE: 35
ADLS_ACUITY_SCORE: 32
DEPENDENT_IADLS:: INDEPENDENT
ADLS_ACUITY_SCORE: 32
ADLS_ACUITY_SCORE: 35
ADLS_ACUITY_SCORE: 32
ADLS_ACUITY_SCORE: 32

## 2023-07-02 NOTE — PROGRESS NOTES
Orthopaedic Surgery Progress Note 07/02/2023    S: No acute events overnight.  Pain  controlled. Worked with therapy yesterday which he felt went well. Ambulated 60 feet, performed exercises. Overall feels well. Tolerating oral diet. + flatus, no BM. Voiding independently.     O:  Temp: 98.1  F (36.7  C) Temp src: Oral BP: (!) 150/70 Pulse: 91   Resp: 16 SpO2: 97 % O2 Device: None (Room air)      Exam:  Gen: No acute distress, resting comfortably in bed.  Resp: Non-labored breathing  MSK:  RLE:  - Dressings c/d/i  - SILT tibial/sural/saphenous/DP/SP nerves  - Fires Quad, TA, EHL, FHL, GaSC  - PT/DP pulses 2+, foot wwp    Recent Labs   Lab 07/01/23  0638 06/30/23  0622 06/29/23  2131   WBC 6.3 6.8 9.3   HGB 10.3* 12.4* 12.3*   * 132* 153       Assessment: Amos Walker is a 78 year old male who sustained a right femoral neck fracture now s/p right hip hemiarthroplasty on 6/30 with Dr. Keller.      Plan:  Medicine Primary  Activity: Up with assist, posterior hip precautions   Weight bearing status: WBAT RLE.  Antibiotics: continue periop Ancef x 24 hours  Diet: Begin with clear fluids and progress diet as tolerated.  DVT prophylaxis: Ok to resume PTA aspirin and plavix on POD1 from orthopedic perspective, will defer to medicine team.  Mechanical while in the hospital.  Bracing/Splinting: abduction pillow to be worn while in bed   Wound Care: keep dressing c/d/i x 7 days.   Pain management: utilize all oral meds first, utilize IV meds for severe breakthrough pain after PO meds given adequate time to take effect  X-rays: Complete  Physical Therapy: eval and treat  Occupational Therapy: eval and treat   Labs: Trend Hgb on POD #1, 2, 3   Disposition: Pending progress with therapies, pain control on orals, and medical stability,  Follow-up: Clinic with Dr. Keller in 2 weeks (on or around 7/14) with no x-rays needed.     Forrest Eduardo MD

## 2023-07-02 NOTE — PLAN OF CARE
"  VS: BP (!) 150/70 (BP Location: Left arm)   Pulse 91   Temp 98.1  F (36.7  C) (Oral)   Resp 16   Ht 1.88 m (6' 2\")   Wt 77.1 kg (170 lb)   SpO2 97%   BMI 21.83 kg/m       O2: 97% at room air.    Output: Voids spontaneously without difficulty using urinal    Last BM: 06/28/23. Passing gas.    Activity: Up with assist of 2 with gait belt and walker   Skin: R hip surgical incision.   L ankle and L 2nd toe wound. Wound care every MWF.    Pain: Pain managed by scheduled tylenol this shift.    CMS: A&O x4.  Baseline neuropathy   Dressing: CDI    Diet: Regular. BG check. POD2 BG- 181   LDA: PIV on left and right arm, saline locked    Equipment: Gait belt, walker, abduction pillow, personal belongings-Cellphone, , hearing aids & .    Plan: Continue with plan of care.   Additional Info: No nicotine patch- patient refused per report                           "

## 2023-07-02 NOTE — PROGRESS NOTES
"   07/02/23 1138   Appointment Info   Signing Clinician's Name / Credentials (OT) Petty Mcdaniels MS, OTR/L, CLT   Rehab Comments (OT) posterior hip precautions   Living Environment   People in Home spouse;child(daisha), adult   Current Living Arrangements house   Home Accessibility stairs to enter home   Number of Stairs, Main Entrance 3   Stair Railings, Main Entrance railing on right side (ascending)   Transportation Anticipated family or friend will provide   Living Environment Comments Pt has walk-in shower, no grab bars. He used to have a shower chair but doesn't anymore. Pt has standard height toilet, thinking of having grab bar installed.   Self-Care   Usual Activity Tolerance moderate   Current Activity Tolerance fair   Fall history within last six months yes   Number of times patient has fallen within last six months 1   Activity/Exercise/Self-Care Comment Pt ind at baseline, uses a cane   Instrumental Activities of Daily Living (IADL)   IADL Comments Pt reports completing most work around the house as his wife has arthritis and has difficulty walking. Pt dtr works full time.   General Information   Onset of Illness/Injury or Date of Surgery 06/30/23   Referring Physician Shaun Lemus MD   Patient/Family Therapy Goal Statement (OT) get stronger, go to rehab   Additional Occupational Profile Info/Pertinent History of Current Problem Per chart: \"Amos Walker is a 78 year old male who sustained a right femoral neck fracture now s/p right hip hemiarthroplasty on 6/30 with Dr. Keller.\"   Existing Precautions/Restrictions fall;no hip IR;no hip ADD past midline;90 degree hip flexion;no pivoting or twisting   Right Lower Extremity (Weight-bearing Status) weight-bearing as tolerated (WBAT)   General Observations and Info Activity: ambulate   Cognitive Status Examination   Orientation Status orientation to person, place and time   Pain Assessment   Patient Currently in Pain Yes, see Vital Sign flowsheet   Range of " Motion Comprehensive   Comment, General Range of Motion BUE WFL   Strength Comprehensive (MMT)   Comment, General Manual Muscle Testing (MMT) Assessment \[facility] [place] [facility]\""   Bed Mobility   Comment (Bed Mobility) n/a this date   Transfers   Transfer Comments min A FWW   Balance   Balance Comments CGA FWW   Activities of Daily Living   BADL Assessment/Intervention lower body dressing;bathing;toileting   Bathing Assessment/Intervention   [place] Level (Bathing) moderate assist (50% patient effort)   Lower Body Dressing Assessment/Training   [place] Level (Lower Body Dressing) maximum assist (25% patient effort)   Toileting   [place] Level (Toileting) minimum assist (75% patient effort)   Clinical Impression   Criteria for Skilled Therapeutic Interventions Met (OT) Yes, treatment indicated   OT Diagnosis decreased I with ADL   OT Problem List-Impairments impacting ADL activity tolerance impaired;pain;post-surgical precautions   Assessment of Occupational Performance 1-3 Performance Deficits   Identified Performance Deficits dressing, bathing, transfers, IADL   Planned Therapy Interventions (OT) ADL retraining;transfer training   Clinical Decision Making Complexity (OT) low complexity   Anticipated Equipment Needs Upon Discharge (OT) shower chair;raised toilet seat;dressing equipment   Risk & Benefits of therapy have been explained evaluation/treatment results reviewed;care plan/treatment goals reviewed;risks/benefits reviewed;current/potential barriers reviewed;participants voiced agreement with care plan;participants included;patient   Clinical Impression Comments Pt presents with impaired ADL 2/2 the above; pt to benefit from skilled OT to address and maximize safety and I with ADL   OT Total Evaluation Time   OT Eval, Low Complexity Minutes (09096) 11   OT Goals   Therapy Frequency (OT) Daily   OT Predicted Duration/Target Date for Goal Attainment 07/07/23   OT Goals Lower Body Dressing;Toilet  Transfer/Toileting;OT Goal 1   OT: Lower Body Dressing Supervision/stand-by assist;within precautions   OT: Toilet Transfer/Toileting Modified independent;within precautions   OT: Goal 1 Pt will complete shower transfer with SBA within precautions   OT Discharge Planning   OT Plan activity tolerance, up to bathroom, toilet transfer, continue to encourage ind with ADL   OT Discharge Recommendation (DC Rec) Transitional Care Facility;home with assist;home with home care occupational therapy   OT Rationale for DC Rec Dual rec at this time, pt below baseline for ADL and has limited supports in place, stairs. At this time would benefit from TCU stay, however pending LOS and continued therapy  may progress to home with A and HH OT. Pt strongly preferring to d/c to TCU.   OT Brief overview of current status min A STS from EOB, min A LE dressing with AE   Total Session Time   Timed Code Treatment Minutes 25   Total Session Time (sum of timed and untimed services) 36

## 2023-07-02 NOTE — PROGRESS NOTES
[facility]    Medicine Progress Note - Hospitalist Service, GOLD TEAM 19    Date of Admission:  6/29/2023    Assessment & Plan      Amos Walker is a 78 year old male who presented after ground level fall for right hip pain, XR demonstrates fracture. Patient has history of significant LE vascular disease and neuropathy, noted in several outpatient providers' documentation that he feels unsteady at times. History of angioplasty for poorly healing wounds on LLE, takes plavix every day and aspirin every other day. History of charcot foot on right that impairs his balance a bit, walks with a cane as needed.         Hip fracture (right)   -  Status post  Right hip hemiarthroplasty 6/30/23   - was ok to resume PTA plavix and aspirin on post operative day 1 by orthopedic surgery     Bracing/Splinting: abduction pillow to be worn while in bed   Wound Care: keep dressing c/d/i x 7 days.   Follow-up: Clinic with Dr. Keller in 2 weeks (on or around 7/14) with no x-rays needed.    States not ready for  Stairs , has 3 steps at home     Acute blood loss anemia on chronic anemia   - post surgery- transfuse if Hg < 7   - restart iron     thrombocytopenia  - plt count was 153 on admission , currently at 123, monitor     Hypocalcemia and Hyponatremia  -Na now at 134, monitor   - replace ca as needed     T2DM complicated by neuropathy and CKD - last A1C was 6.1 in April  - resume home dulaglutide (weekly), on  Insulin sliding scale   -  order 3 unit(s) TID w meals; says he usually does 6-7 with bfast, can correct if runs high     HTN  benign essential  - lisinopril 10 mg daily, currently on hold, restat as blood pressure  Allows and creatinine stable       CAD s/p MARCELL  3/18  To mid/prox LAD, LM and mid /prox and ostial RCA     PAD s/p angioplasty left SFA and popliteal arteries 2021    venous insufficiency   - patient on dual antiplatelet with aspirin (every other day) and plavix daily,  last took both evening of 6/28  - back on aspirin, restart  plavix 7/2 ( was ok to resume per orthopedic surgery )   - resume home statin  - follow up  With cardiology , I could not find echo reports      CKD II with last eGFR from 6/29 70 mL/min  - continue ace inh, avoid hypotension  - continue to hold Lisinopril      Emphysema with normal PFT's (2020) due to Tobacco Use Disorder  - not on inhalers, do incentive spirometry after surgery and can provide prn albuterol as needed        MGUS  - follows with heme/onc as out patient       Osteoporosis on alendronate weekly  - not sure what day his dose falls on, can order when required, 70 mg  - can reorder home vit d and calcium       Hyperlipidemia  - continue statins         Diet: Advance Diet as Tolerated: Regular Diet Adult    DVT Prophylaxis: aspirin, plavix   Daniel Catheter: Not present  Lines: None     Cardiac Monitoring: None  Code Status: Full Code      Clinically Significant Risk Factors              # Hypoalbuminemia: Lowest albumin = 3 g/dL at 7/2/2023  6:48 AM, will monitor as appropriate   # Thrombocytopenia: Lowest platelets = 127 in last 2 days, will monitor for bleeding   # Hypertension: Noted on problem list                 Disposition Plan         Georgina Saunders MD  Hospitalist Service, GOLD TEAM 19  [facility]  Securely message with Pulse 8 (more info)  Text page via [employer] Paging/Directory   See signed in provider for up to date coverage information  ______________________________________________________________________    Interval History   Doing ok, no chest pain  No shortness of breath  No nausea vomiting. stil with pain, tells me he is not ready to try stairs  continue to encourage     Physical Exam   Vital Signs: Temp: 98.1  F (36.7  C) Temp src: Oral BP: (!) 150/70 Pulse: 91   Resp: 16 SpO2: 97 % O2 Device: None (Room air)    Weight: 170 lbs 0 oz  General appearance: awake alert in  no apparent  distress     HEENT: EOMI and PEARLA, sclera nonicteric, moist mucus membranes, head atraumatic  NECK: supple  RESPIRATORY: lungs are clear   CARDIOVASCULAR: normal S1S2 regular rate and rhythm  GASTROINTESTINAL: abdomen is  soft,  non-distended, non-tender with normal bowel sounds.    masses felt, no hepatosplenomegaly noted    MUSCULOSKELETAL:abductor pillow in place   SKIN: warm and dry, no rashes, no mottling noted   NEUROLOGIC: awake alert and oriented   EXTREMITIES: no clubbing cyanosis or edema   Data     I have personally reviewed the following data over the past 24 hrs:    5.8  \   10.2 (L)   / 127 (L)     137 103 19.6 /  179 (H)   3.9 23 0.99 \       ALT: N/A AST: N/A AP: N/A TBILI: N/A   ALB: 3.0 (L) TOT PROTEIN: N/A LIPASE: N/A       Imaging results reviewed over the past 24 hrs:   No results found for this or any previous visit (from the past 24 hour(s)).

## 2023-07-02 NOTE — CONSULTS
Care Management Initial Consult    General Information  Assessment completed with: Amos Dean  Type of CM/SW Visit: Initial Assessment    Primary Care Provider verified and updated as needed: Yes   Readmission within the last 30 days: no previous admission in last 30 days      Reason for Consult: discharge planning  Advance Care Planning: Advance Care Planning Reviewed: no concerns identified          Communication Assessment  Patient's communication style: spoken language (English or Bilingual)    Hearing Difficulty or Deaf: yes   Wear Glasses or Blind: yes    Cognitive  Cognitive/Neuro/Behavioral: WDL  Level of Consciousness: alert  Arousal Level: opens eyes spontaneously  Orientation: oriented x 4  Mood/Behavior: calm, cooperative  Best Language: 0 - No aphasia  Speech: clear, spontaneous, logical    Living Environment:   People in home: spouse, child(daisha), adult  Danielle (do not have name of daughter)  Current living Arrangements: house      Able to return to prior arrangements: other (see comments) (currentlydualrecs, pt adamant about going to TCU)       Family/Social Support:  Care provided by: self  Provides care for: no one  Marital Status:   Wife  Danielle       Description of Support System: Supportive, Involved    Support Assessment: Lacks adequate physical care    Current Resources:   Patient receiving home care services: No     Community Resources: None  Equipment currently used at home: cane, straight, walker, rolling (has equipment in home but has not needed to use)  Supplies currently used at home: Diabetic Supplies, Wound Care Supplies    Employment/Financial:  Employment Status: retired        Financial Concerns: No concerns identified   Referral to Financial Worker: No       Does the patient's insurance plan have a 3 day qualifying hospital stay waiver?  No    Lifestyle & Psychosocial Needs:  Social Determinants of Health     Tobacco Use: High Risk (6/20/2023)    Patient History      Smoking  Tobacco Use: Every Day      Smokeless Tobacco Use: Never      Passive Exposure: Not on file   Alcohol Use: Not on file   Financial Resource Strain: Not on file   Food Insecurity: Not on file   Transportation Needs: Not on file   Physical Activity: Not on file   Stress: Not on file   Social Connections: Not on file   Intimate Partner Violence: Not on file   Depression: Not at risk (4/5/2023)    PHQ-2      PHQ-2 Score: 0   Housing Stability: Not on file       Functional Status:  Prior to admission patient needed assistance:   Dependent ADLs:: Independent  Dependent IADLs:: Independent  Assesssment of Functional Status: Not at baseline with mobility    Mental Health Status:  Mental Health Status: No Current Concerns       Chemical Dependency Status:  Chemical Dependency Status: No Current Concerns             Values/Beliefs:  Spiritual, Cultural Beliefs, Judaism Practices, Values that affect care: no       Cultural/Judaism Practices Patient Routinely Participates In: prayer  Values/Beliefs Comment: Former     Additional Information:  Per Chart Review:  Amos Walker is a 78 year old male who presented after ground level fall for right hip pain, XR demonstrates fracture. Patient has history of significant LE vascular disease and neuropathy, noted in several outpatient providers' documentation that he feels unsteady at times. History of angioplasty for poorly healing wounds on LLE, takes plavix every day and aspirin every other day. History of charcot foot on right that impairs his balance a bit, walks with a cane as needed.     This RNCC met with patient at bedside to introduce role of RNCC, complete initial assessment and discuss discharge planning. Patient states he moved last year from their town home which had 30 steps and moved into a rambler with only 2 steps to enter.Patient lives with his wife, who he states is not physically able to assist him as she uses a cane and has bad knees, They have a  daughter who lives with them also; however they do not see her much as she works long hours and is always out with her friends so she would not be much help.  Patient sates he has a cane and 4 wheel walker at home but has not had to use prior to admit, he is independent with all aspects of his care and still drives. Patient is a diabetic but states it is stable. Patient is adamant that he needs a TCU before he can go home. Patient states he has been at [facility] in the past and would like a referral sent to this facility.    Care management will continue to follow patient and assist with transition to appropriate level of care when discharge is imminent.    Kaylee UPTONN RN CCM  RN Care Coordinator 8A and 10 ICU  [address] [address]  [email or web address]   Office: (10 ICU) [phone]   Pager: [phone]    For Weekend & Holiday on call RN Care Coordinator:  (Tasks: Home care, home infusion, medical equipment/oxygen, transportation, IMM & LANG forms, etc.)   [place] [facility] (0800-1630) Saturday & Sunday; (0800-1630)  Recognized Holidays  Pager #1: [phone] Units: 4A, 4C, 4E, 5A & 5B   Pager #2: [phone] Units: 6A, 6B, 6C, 6D  Pager #3: [phone] Units: 7A, 7B, 7C, 7D & 5C   Pager #4: [phone] Units: 5 Ortho, 8A, 10 ICU, & [facility]      For Weekend & Holiday on call Social Work:  (Tasks: TCU, transportation, Hospice, adjustment to illness counseling, Health Care Directives, Child Protection and Domestic Violence concerns, Vulnerable Adult, IMM forms, etc.)   [place] (0800 - 1630) Saturday and Sunday  Pager: [phone] Units: 4A, 4C, 4E, 5A and 5B   Pager: [phone] Units: 6A, 6B, 6C, 6D   Pager: [phone]: 7A, 7B, 7C, 7D, and 5C      [facility] (0800-1630) Saturday and Sunday  Units: 5 Ortho, 8A, and 10 ICU   Pager: [phone]

## 2023-07-03 ENCOUNTER — APPOINTMENT (OUTPATIENT)
Dept: OCCUPATIONAL THERAPY | Facility: CLINIC | Age: 78
DRG: 522 | End: 2023-07-03
Payer: COMMERCIAL

## 2023-07-03 ENCOUNTER — APPOINTMENT (OUTPATIENT)
Dept: PHYSICAL THERAPY | Facility: CLINIC | Age: 78
DRG: 522 | End: 2023-07-03
Payer: COMMERCIAL

## 2023-07-03 LAB
GLUCOSE BLDC GLUCOMTR-MCNC: 137 MG/DL (ref 70–99)
GLUCOSE BLDC GLUCOMTR-MCNC: 156 MG/DL (ref 70–99)
GLUCOSE BLDC GLUCOMTR-MCNC: 181 MG/DL (ref 70–99)
GLUCOSE BLDC GLUCOMTR-MCNC: 191 MG/DL (ref 70–99)
GLUCOSE BLDC GLUCOMTR-MCNC: 269 MG/DL (ref 70–99)

## 2023-07-03 PROCEDURE — 250N000013 HC RX MED GY IP 250 OP 250 PS 637: Performed by: INTERNAL MEDICINE

## 2023-07-03 PROCEDURE — 97116 GAIT TRAINING THERAPY: CPT | Mod: GP

## 2023-07-03 PROCEDURE — 250N000013 HC RX MED GY IP 250 OP 250 PS 637: Performed by: STUDENT IN AN ORGANIZED HEALTH CARE EDUCATION/TRAINING PROGRAM

## 2023-07-03 PROCEDURE — 97530 THERAPEUTIC ACTIVITIES: CPT | Mod: GP

## 2023-07-03 PROCEDURE — 99232 SBSQ HOSP IP/OBS MODERATE 35: CPT | Performed by: INTERNAL MEDICINE

## 2023-07-03 PROCEDURE — 120N000002 HC R&B MED SURG/OB UMMC

## 2023-07-03 PROCEDURE — 97535 SELF CARE MNGMENT TRAINING: CPT | Mod: GO

## 2023-07-03 RX ORDER — OXYCODONE HYDROCHLORIDE 5 MG/1
2.5 TABLET ORAL EVERY 4 HOURS PRN
Qty: 20 TABLET | Refills: 0 | Status: SHIPPED | OUTPATIENT
Start: 2023-07-03 | End: 2023-08-17

## 2023-07-03 RX ORDER — ACETAMINOPHEN 325 MG/1
975 TABLET ORAL EVERY 8 HOURS PRN
Status: DISCONTINUED | OUTPATIENT
Start: 2023-07-03 | End: 2023-07-10 | Stop reason: HOSPADM

## 2023-07-03 RX ORDER — ASPIRIN 81 MG/1
81 TABLET ORAL EVERY OTHER DAY
Start: 2023-07-04

## 2023-07-03 RX ORDER — POLYETHYLENE GLYCOL 3350 17 G/17G
17 POWDER, FOR SOLUTION ORAL DAILY
Qty: 510 G | Refills: 0 | Status: SHIPPED | OUTPATIENT
Start: 2023-07-03 | End: 2023-08-17

## 2023-07-03 RX ORDER — NICOTINE POLACRILEX 4 MG
15-30 LOZENGE BUCCAL
Start: 2023-07-03 | End: 2023-08-17

## 2023-07-03 RX ORDER — AMOXICILLIN 250 MG
1 CAPSULE ORAL 2 TIMES DAILY
Qty: 30 TABLET | Refills: 0 | Status: SHIPPED | OUTPATIENT
Start: 2023-07-03

## 2023-07-03 RX ADMIN — ACETAMINOPHEN 975 MG: 325 TABLET, FILM COATED ORAL at 06:54

## 2023-07-03 RX ADMIN — Medication 25 MCG: at 08:50

## 2023-07-03 RX ADMIN — SENNOSIDES AND DOCUSATE SODIUM 1 TABLET: 50; 8.6 TABLET ORAL at 19:46

## 2023-07-03 RX ADMIN — INSULIN ASPART 1 UNITS: 100 INJECTION, SOLUTION INTRAVENOUS; SUBCUTANEOUS at 08:41

## 2023-07-03 RX ADMIN — INSULIN ASPART 1 UNITS: 100 INJECTION, SOLUTION INTRAVENOUS; SUBCUTANEOUS at 12:45

## 2023-07-03 RX ADMIN — CALCIUM 500 MG: 500 TABLET ORAL at 08:52

## 2023-07-03 RX ADMIN — POLYETHYLENE GLYCOL 3350 17 G: 17 POWDER, FOR SOLUTION ORAL at 08:49

## 2023-07-03 RX ADMIN — Medication 25 MCG: at 19:46

## 2023-07-03 RX ADMIN — GENTAMICIN SULFATE: 1 CREAM TOPICAL at 16:12

## 2023-07-03 RX ADMIN — CALCIUM 500 MG: 500 TABLET ORAL at 19:46

## 2023-07-03 RX ADMIN — CETIRIZINE HYDROCHLORIDE 10 MG: 10 TABLET, FILM COATED ORAL at 08:50

## 2023-07-03 RX ADMIN — SIMVASTATIN 40 MG: 40 TABLET, FILM COATED ORAL at 22:10

## 2023-07-03 RX ADMIN — OXYCODONE HYDROCHLORIDE AND ACETAMINOPHEN 500 MG: 500 TABLET ORAL at 08:49

## 2023-07-03 RX ADMIN — SENNOSIDES AND DOCUSATE SODIUM 1 TABLET: 50; 8.6 TABLET ORAL at 08:49

## 2023-07-03 RX ADMIN — CLOPIDOGREL BISULFATE 75 MG: 75 TABLET, FILM COATED ORAL at 08:53

## 2023-07-03 RX ADMIN — ACETAMINOPHEN 325 MG: 325 TABLET, FILM COATED ORAL at 20:28

## 2023-07-03 ASSESSMENT — ACTIVITIES OF DAILY LIVING (ADL)
ADLS_ACUITY_SCORE: 37
ADLS_ACUITY_SCORE: 37
ADLS_ACUITY_SCORE: 35
ADLS_ACUITY_SCORE: 37
ADLS_ACUITY_SCORE: 34

## 2023-07-03 NOTE — CARE PLAN
"  VS: BP (!) 147/73 (BP Location: Left arm)   Pulse 80   Temp 98.7  F (37.1  C) (Oral)   Resp 16   Ht 1.88 m (6' 2\")   Wt 77.1 kg (170 lb)   SpO2 98%   BMI 21.83 kg/m        O2: RA, stable . 92%    Output: Voids spontaneously without difficulty using with urinal.   Last BM: 06/28/23.   Activity: Up with assist of 2 with gait belt and walker   Skin: R hip surgical incision.   L ankle and L 2nd toe wound. Wound care every MWF.    Pain: Pain managed by scheduled tylenol this shift.    CMS: A&O x4.  Baseline neuropathy   Dressing: CDI    Diet: Regular. BG check and carb count.   LDA: PIV on left and right arm, saline locked    Equipment: Gait belt, walker, abduction pillow, personal belongings.   Plan: Expected to be discharged tomorrow, continue plan of care   Additional Info: Patient refused nicotine patch.       "

## 2023-07-03 NOTE — PLAN OF CARE
"  VS: BP (!) 147/73 (BP Location: Left arm)   Pulse 80   Temp 98.7  F (37.1  C) (Oral)   Resp 16   Ht 1.88 m (6' 2\")   Wt 77.1 kg (170 lb)   SpO2 98%   BMI 21.83 kg/m      O2: Sable on room air >90%, denies SOB   Output: Void spontaneously no difficulty, uses urinal at bedside   Last BM: 6/28/23   Activity: Up with 2 assist with walker and gait belt. Abduction pillow when in bed.   Skin: Right hip surgical incision, wound on left ankle and left second toe. Wound care MWF   Pain: Denies pain. Scheduled tylenol given x 1   CMS: Intact, A&Ox4, baseline neuropathy, dorsalis pedis are diminished in BLE.   Dressing: Right hip incision-CDI   Diet: Regular diet, thin liquids  bedtime  ( 1 unit given)   LDA: Right/ left PIV, clean dry, intact- saline locked   Equipment: IV pole, hearing aid. Walkerjen, cell phone    Plan: Discharged tomorrow to tcu   Additional Info: Denies chest pain, N/V, dizziness, headaches. Hard of hearing                             "

## 2023-07-03 NOTE — PROGRESS NOTES
Orthopaedic Surgery Progress Note 07/03/2023    S: No acute events overnight.  Pain  Controlled. Hearing aids not charged this AM, limited discussion. Worked with therapy yesterday, which he felt went well. Overall feels well. Tolerating oral diet. + flatus, no BM. Voiding independently.     O:  Temp: 97.7  F (36.5  C) Temp src: Oral BP: (!) 146/62 Pulse: 83   Resp: 16 SpO2: 97 % O2 Device: None (Room air)      Exam:  Gen: No acute distress, resting comfortably in bed.  Resp: Non-labored breathing  MSK:  RLE:  - Dressings c/d/i  - SILT tibial/sural/saphenous/DP/SP nerves  - Fires Quad, TA, EHL, FHL, GaSC  - PT/DP pulses 2+, foot wwp    Recent Labs   Lab 07/02/23  0648 07/01/23  0638 06/30/23  0622   WBC 5.8 6.3 6.8   HGB 10.2* 10.3* 12.4*   * 127* 132*       Assessment: Amos Walker is a 78 year old male who sustained a right femoral neck fracture now s/p right hip hemiarthroplasty on 6/30 with Dr. Keller. Doing well.     Plan:  Medicine Primary  Activity: Up with assist, posterior hip precautions   Weight bearing status: WBAT RLE.  Antibiotics: continue periop Ancef x 24 hours  Diet: Begin with clear fluids and progress diet as tolerated.  DVT prophylaxis: Ok to resume PTA aspirin and plavix on POD1 from orthopedic perspective, will defer to medicine team.  Mechanical while in the hospital.  Bracing/Splinting: abduction pillow to be worn while in bed   Wound Care: keep dressing c/d/i x 7 days.   Pain management: utilize all oral meds first, utilize IV meds for severe breakthrough pain after PO meds given adequate time to take effect  X-rays: Complete  Physical Therapy: eval and treat  Occupational Therapy: eval and treat   Labs: Trend Hgb on POD #1, 2, 3   Disposition: Pending progress with therapies, pain control on orals, and medical stability, ready for discharge from ortho perspective once cleared by PT/OT  Follow-up: Clinic with Dr. Keller in 2 weeks (on or around 7/14) with no x-rays  needed.    --  Daryl García MD, PhD  Orthopedic Surgery PGY-1  [phone]    Please page me directly with any questions/concerns during regular weekday hours before 5pm. If there is no response, if it is a weekend, or if it is during evening hours, then please page the orthopedic surgery resident on call.

## 2023-07-03 NOTE — PROGRESS NOTES
7/3/23    Care Management Follow-up  [facility] sent out initial referrals to the following facilities. Pt told his nurse Enzo PETERSON that he is interested in the three facilities.     Pending:    [facility]  [address] [place] [address]  PH: [phone]  FAX: [phone]  7/3: Initial referrals sent via [facility]    [address] [address]  PH: [phone]  FAX: [phone]  7/3: Initial referrals sent via [facility]    Declined:  [facility]  7/3: Pt is being followed  7/3: Declined due to long wait list, recommend looking into community TCUs

## 2023-07-03 NOTE — PLAN OF CARE
"  VS: BP (!) 143/66 (BP Location: Left arm)   Pulse 81   Temp 99.2  F (37.3  C) (Oral)   Resp 16   Ht 1.88 m (6' 2\")   Wt 77.1 kg (170 lb)   SpO2 97%   BMI 21.83 kg/m    Pt denies chest pain.    O2: >90% on RA. Lung sounds clear and equal bilaterally   Output: Voids spontaneously without difficulty to urinal    Last BM: 6/28/23 per pt report. Bowel sounds active in all quadrants.   Activity: Ax1-2 with walker and gait belt. Pt not OOB overnight.    Up for meals? N/A   Skin: R hip incision. Dry peeling skin to bilateral feet. L ankle and L 2nd toe wound with wound care every MWF.    Pain: Pt reports tenderness to R hip, managed with scheduled Tylenol.    CMS: Baseline neuropathy per pt report. A&O x4   Dressing: R hip - CDI  Bilateral heel mepilex dressings - CDI   Diet: Regular   LDA: PIV L and R forearm - SL   Equipment: IV pole/pump, abductor pillow, walker, gait belt, and pt belongings.    Plan: TBD - care coordinator following.    Additional Info: Pt is hard of hearing - bilateral hearing aids in pt room charging  2200   0200   No nicotine patch in place.        "

## 2023-07-03 NOTE — PROGRESS NOTES
[facility]    Medicine Progress Note - Hospitalist Service, GOLD TEAM 19    Date of Admission:  6/29/2023    Assessment & Plan      Amos Walker is a 78 year old male who presented after ground level fall for right hip pain, XR demonstrates fracture. Patient has history of significant LE vascular disease and neuropathy, noted in several outpatient providers' documentation that he feels unsteady at times. History of angioplasty for poorly healing wounds on LLE, takes plavix every day and aspirin every other day. History of charcot foot on right that impairs his balance a bit, walks with a cane as needed.         Hip fracture (right)   -  Status post  Right hip hemiarthroplasty 6/30/23   - was ok to resume PTA plavix and aspirin on post operative day 1 by orthopedic surgery     Bracing/Splinting: abduction pillow to be worn while in bed   Wound Care: keep dressing c/d/i x 7 days.   Follow-up: Clinic with Dr. Keller in 2 weeks (on or around 7/14) with no x-rays needed.    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 and CKD - last A1C was 6.1 in April  - resume home dulaglutide (weekly), on  Insulin sliding scale   -  order 3 unit(s) TID w meals; says he usually does 6-7 with bfast, can correct if runs high     HTN  benign essential  - lisinopril 10 mg daily, currently on hold, restat as blood pressure  Allows and creatinine stable       CAD s/p MARCELL  3/18  To mid/prox LAD, LM and mid /prox and ostial RCA     PAD s/p angioplasty left SFA and popliteal arteries 2021    venous insufficiency   - patient on dual antiplatelet with aspirin  (every other day) and plavix daily, last took both evening of 6/28  - back on aspirin, restart  plavix 7/2 ( was ok to resume per orthopedic surgery )   - continue home statin  - follow up  With cardiology , I could not find echo reports      CKD II with last eGFR from 6/29 70 mL/min  - continue ace inh, avoid hypotension  - continue to hold Lisinopril      Emphysema with normal PFT's (2020) due to Tobacco Use Disorder  - not on inhalers, do incentive spirometry after surgery and can provide prn albuterol as needed        MGUS  - follows with heme/onc as out patient       Osteoporosis on alendronate weekly  - not sure what day his dose falls on, can order when required, 70 mg  - can reorder home vit d and calcium       Hyperlipidemia  - continue statins         Diet: Advance Diet as Tolerated: Regular Diet Adult    DVT Prophylaxis: aspirin, plavix   Daniel Catheter: Not present  Lines: None     Cardiac Monitoring: None  Code Status: Full Code      Clinically Significant Risk Factors              # Hypoalbuminemia: Lowest albumin = 3 g/dL at 7/2/2023  6:48 AM, will monitor as appropriate   # Thrombocytopenia: Lowest platelets = 127 in last 2 days, will monitor for bleeding   # Hypertension: Noted on problem list                 Disposition Plan      Home vs TCU,  following , also acute rehab will evaluate   Would be ready for TCU any time         Georgina Saunders MD  Hospitalist Service, GOLD TEAM 19  [facility]  Securely message with [employer] (more info)  Text page via [facility] Paging/Directory   See signed in provider for up to date coverage information  ______________________________________________________________________    Interval History   Afraid of moving, wanst to go to TCU, ha sno help at home   denies any chest pain  No shortness of breath  No nausea vomiting     Physical Exam   Vital Signs: Temp: 99.2  F (37.3  C) Temp src: Oral BP: (!) 143/66 Pulse: 81    Resp: 16 SpO2: 97 % O2 Device: None (Room air)    Weight: 170 lbs 0 oz  General appearance: awake alert in  no apparent distress     HEENT: EOMI and PEARLA, sclera nonicteric, moist mucus membranes, head atraumatic  NECK: supple  RESPIRATORY: lungs are clear   CARDIOVASCULAR: normal S1S2 regular rate and rhythm  GASTROINTESTINAL: abdomen is  soft,  non-distended, non-tender with normal bowel sounds.    masses felt, no hepatosplenomegaly noted    MUSCULOSKELETAL:abductor pillow in place   SKIN: warm and dry, no rashes, no mottling noted   NEUROLOGIC: awake alert and oriented   EXTREMITIES: no clubbing cyanosis or edema   Data         Imaging results reviewed over the past 24 hrs:   No results found for this or any previous visit (from the past 24 hour(s)).

## 2023-07-03 NOTE — PROGRESS NOTES
CLINICAL NUTRITION SERVICES - BRIEF NOTE      Nutrition Prescription     RECOMMENDATIONS FOR MDs/PROVIDERS TO ORDER:  RDN to follow at LOS per protocol. Please consult if nutrition needs arise.     Recommendations already ordered by Registered Dietitian (RD):  None at this time.     REASON FOR ASSESSMENT  Amos Walker is a/an 78 year old male assessed by the dietitian for Admission Nutrition Screen positive for unsure wt loss and no decreased appetite.    EVALUATION OF THE PROGRESS TOWARD GOALS   Diet: Regular  Intake: No reports for inadequate intake per nursing notes.     NEW FINDINGS   No significant weight loss noted.     Weight:  Wt Readings from Last 20 Encounters:   06/29/23 77.1 kg (170 lb)   04/19/23 76.1 kg (167 lb 11.2 oz)   02/03/23 75.3 kg (166 lb 1.6 oz)   01/25/23 77.6 kg (171 lb)   09/12/22 77.8 kg (171 lb 9.6 oz)   09/01/22 77.1 kg (170 lb)   05/11/22 76.4 kg (168 lb 6.4 oz)   01/10/22 77.7 kg (171 lb 3.2 oz)   08/16/21 77.4 kg (170 lb 9.6 oz)   05/19/21 76.2 kg (168 lb)   05/10/21 76.2 kg (168 lb)   03/30/21 76.2 kg (168 lb)   03/03/21 76.7 kg (169 lb)   02/12/21 76.7 kg (169 lb)   02/11/21 75.8 kg (167 lb)   01/12/21 76.4 kg (168 lb 6.9 oz)   01/05/21 75.8 kg (167 lb)   10/05/20 74.8 kg (165 lb)   07/06/20 75.9 kg (167 lb 4.8 oz)   06/03/20 74.8 kg (165 lb)     INTERVENTIONS  None at this time.      Monitoring/Evaluation  RDN to follow at LOS per protocol. RD may be consulted if needs arise.    Huma Welch MS, RDN, LDN  RD pager: [phone]  WB Weekend/Holiday Pager: [phone]

## 2023-07-04 ENCOUNTER — APPOINTMENT (OUTPATIENT)
Dept: OCCUPATIONAL THERAPY | Facility: CLINIC | Age: 78
DRG: 522 | End: 2023-07-04
Payer: COMMERCIAL

## 2023-07-04 ENCOUNTER — APPOINTMENT (OUTPATIENT)
Dept: PHYSICAL THERAPY | Facility: CLINIC | Age: 78
DRG: 522 | End: 2023-07-04
Payer: COMMERCIAL

## 2023-07-04 LAB
GLUCOSE BLDC GLUCOMTR-MCNC: 206 MG/DL (ref 70–99)
GLUCOSE BLDC GLUCOMTR-MCNC: 225 MG/DL (ref 70–99)
GLUCOSE BLDC GLUCOMTR-MCNC: 232 MG/DL (ref 70–99)
GLUCOSE BLDC GLUCOMTR-MCNC: 240 MG/DL (ref 70–99)
GLUCOSE BLDC GLUCOMTR-MCNC: 242 MG/DL (ref 70–99)
GLUCOSE BLDC GLUCOMTR-MCNC: 242 MG/DL (ref 70–99)

## 2023-07-04 PROCEDURE — 250N000013 HC RX MED GY IP 250 OP 250 PS 637: Performed by: INTERNAL MEDICINE

## 2023-07-04 PROCEDURE — 250N000013 HC RX MED GY IP 250 OP 250 PS 637: Performed by: STUDENT IN AN ORGANIZED HEALTH CARE EDUCATION/TRAINING PROGRAM

## 2023-07-04 PROCEDURE — 97535 SELF CARE MNGMENT TRAINING: CPT | Mod: GO

## 2023-07-04 PROCEDURE — 97110 THERAPEUTIC EXERCISES: CPT | Mod: GP

## 2023-07-04 PROCEDURE — 97530 THERAPEUTIC ACTIVITIES: CPT | Mod: GP

## 2023-07-04 PROCEDURE — 120N000002 HC R&B MED SURG/OB UMMC

## 2023-07-04 PROCEDURE — 99232 SBSQ HOSP IP/OBS MODERATE 35: CPT | Performed by: INTERNAL MEDICINE

## 2023-07-04 PROCEDURE — 97116 GAIT TRAINING THERAPY: CPT | Mod: GP

## 2023-07-04 RX ORDER — ACETAMINOPHEN 325 MG/1
975 TABLET ORAL EVERY 8 HOURS
Status: DISCONTINUED | OUTPATIENT
Start: 2023-07-04 | End: 2023-07-10 | Stop reason: HOSPADM

## 2023-07-04 RX ADMIN — ACETAMINOPHEN 975 MG: 325 TABLET, FILM COATED ORAL at 08:20

## 2023-07-04 RX ADMIN — INSULIN ASPART 3 UNITS: 100 INJECTION, SOLUTION INTRAVENOUS; SUBCUTANEOUS at 13:03

## 2023-07-04 RX ADMIN — ACETAMINOPHEN 975 MG: 325 TABLET, FILM COATED ORAL at 16:37

## 2023-07-04 RX ADMIN — INSULIN ASPART 2 UNITS: 100 INJECTION, SOLUTION INTRAVENOUS; SUBCUTANEOUS at 18:30

## 2023-07-04 RX ADMIN — Medication 25 MCG: at 08:21

## 2023-07-04 RX ADMIN — INSULIN ASPART 2 UNITS: 100 INJECTION, SOLUTION INTRAVENOUS; SUBCUTANEOUS at 08:24

## 2023-07-04 RX ADMIN — SIMVASTATIN 40 MG: 40 TABLET, FILM COATED ORAL at 22:34

## 2023-07-04 RX ADMIN — CLOPIDOGREL BISULFATE 75 MG: 75 TABLET, FILM COATED ORAL at 08:21

## 2023-07-04 RX ADMIN — ASPIRIN 81 MG: 81 TABLET, COATED ORAL at 08:21

## 2023-07-04 RX ADMIN — Medication 25 MCG: at 19:41

## 2023-07-04 RX ADMIN — ACETAMINOPHEN 975 MG: 325 TABLET, FILM COATED ORAL at 22:34

## 2023-07-04 RX ADMIN — CALCIUM 500 MG: 500 TABLET ORAL at 19:41

## 2023-07-04 RX ADMIN — OXYCODONE HYDROCHLORIDE AND ACETAMINOPHEN 500 MG: 500 TABLET ORAL at 08:21

## 2023-07-04 RX ADMIN — CETIRIZINE HYDROCHLORIDE 10 MG: 10 TABLET, FILM COATED ORAL at 08:21

## 2023-07-04 RX ADMIN — CALCIUM 500 MG: 500 TABLET ORAL at 08:20

## 2023-07-04 RX ADMIN — SENNOSIDES AND DOCUSATE SODIUM 1 TABLET: 50; 8.6 TABLET ORAL at 08:21

## 2023-07-04 ASSESSMENT — ACTIVITIES OF DAILY LIVING (ADL)
ADLS_ACUITY_SCORE: 34

## 2023-07-04 NOTE — PLAN OF CARE
Patient A/Ox4, very hard of hearing. VSS ex BP a little elevated. Denies CP, SOB, dizziness/LH. LSCTA. +fl/BS, had a large BM overnight. Voiding well in bathroom. CMS intact ex some baseline neuropathy. Dressing to hip CDI ex a little bit pulling off proximally. Tolerating regular diet without NV. IS encouraged. Activity level is good, pt is assist of 1-2 to bathroom with walker and gait belt. IV SL. Pain rated as comfortably managed while laying still, pt grimaced while walking - only taking tylenol. TCU referrals sent, pt wants ratings of them before settling on a decision. Patient has demonstrated ability to call appropriately. Patient is resting with call light within reach. Will continue to monitor.

## 2023-07-04 NOTE — PROGRESS NOTES
[facility]    Medicine Progress Note - Hospitalist Service, GOLD TEAM 18    Date of Admission:  6/29/2023    Assessment & Plan   A: Patient is a 79 y/o man who has multiple medical problems including coronary artery disease, diabetes mellitus type II, hypertension, peripheral artery disease, chronic kidney disease stage III and emphysematous changes of lung noted on CT. Patient presented after a ground level fall with right hip pain and was found to have a right femoral neck fracture. Patient underwent right hip hemiarthroplasty on 30-Jun-2023.    P:  1.) Right femoral neck fracture s/p right hip hemiarthroplasty on 30-Jun-2023:  - Will resume scheduled acetaminophen for pain control.  - Patient on acetaminophen, IV hydromorphone and PO oxycodone as needed.    2.) Diabetes mellitus type II complicated by neuropathy and chronic kidney disease; patient on trulicity and mealtime insulin as outpatient:  - Patient to continue mealtime insulin and insulin sliding scale.  - After discussion with patient, patient's wife will bring in trulictity tomorrow. Patient declined long-acting insulin.    3.) Post-operative acute blood loss anemia; patient has chronic anemia at baseline:  - Will recheck labs tomorrow.  - Will transfuse for Hb < 7    4.) Thrombocytopenia:  - Labs to be checked tomorrow.  - Monitoring for bleeding.    5.) Chronic kidney disease, reportedly stage III, likely stage IIIa:  - Avoiding nephrotoxins.  - Labs to be rechecked tomorrow.    6.) Hypertension:  - Blood pressure controlled off lisinopril. Monitoring.    7.) Coronary artery disease, patient asymptomatic:  - Patient on aspirin and plavix.    8.) Emphysema on CT chest, patient had normal pulmonary function testing in 2020; likely secondary to smoking:  - Monitoring for symptoms. Further monitoring as outpatient.    9.) Hyperlipidemia:  - Patient on simvastatin.    10.) MGUS:  - To f/u as outpatient.    11.)  Osteoporosis:   - Patient usually on alendronate but this is currently on hold. Anticipate resumption as outpatient.    12.) Hyponatremia; hypocalcemia:  - Monitoring labs as indicated.        Diet: Advance Diet as Tolerated: Regular Diet Adult  Diet    Daniel Catheter: Not present  Lines: None     Cardiac Monitoring: None  Code Status: Full Code      Clinically Significant Risk Factors              # Hypoalbuminemia: Lowest albumin = 3 g/dL at 7/2/2023  6:48 AM, will monitor as appropriate     # Hypertension: Noted on problem list                   Jj Flores MD  Hospitalist Service, GOLD TEAM 18  [facility]  Securely message with [employer] (more info)  Text page via [facility] Paging/Directory   See signed in provider for up to date coverage information  ______________________________________________________________________    Interval History     Patient noted some increase in pain off scheduled acetaminophen and asked that it be resumed. Patient noted no other problems.    Patient stated that his wife was going to bring in his trulicity tomorrow.    Physical Exam   Vital Signs: Temp: 98.4  F (36.9  C) Temp src: Oral BP: 105/51 Pulse: 84   Resp: 16 SpO2: 96 % O2 Device: None (Room air)    Weight: 168 lbs 6.9 oz    General: Patient comfortable, NAD.  Heart: RRR, S1 S2 w/o murmurs.  Lungs: Breath sounds present. No crackles/wheezes heard.  Abdomen: Soft, nontender.    Medical Decision Making

## 2023-07-04 NOTE — PROGRESS NOTES
"  VS: /56   Pulse 76   Temp 97.3  F (36.3  C) (Oral)   Resp 16   Ht 1.88 m (6' 2\")   Wt 76.4 kg (168 lb 6.9 oz)   SpO2 98%   BMI 21.63 kg/m       O2: RA   Output: Voids in urinal   Last BM: 7/4   Activity: 2 assist w/ walker & gait belt   Skin: Intact, except for R hip incision,    Pain: Intermittent when ambulating   CMS: BLE neuropathy   Dressing: R hip dressing   Diet: Reg   LDA: PIV in LFA   Equipment: Gait belt & walker   Plan: Discharge to TCU pending bed placement   Additional Info:      His wife will bring in his Trulicity insulin tomorrow.    "

## 2023-07-04 NOTE — PROGRESS NOTES
Care Management Follow Up    Length of Stay (days): 5    Expected Discharge Date: 07/01/2023     Concerns to be Addressed: discharge planning  [employer] denied TCU stay last time poli was in the hospital  Patient plan of care discussed at interdisciplinary rounds: Yes    Anticipated Discharge Disposition: Transitional Care     Anticipated Discharge Services: None  Anticipated Discharge DME: None    Patient/family educated on Medicare website which has current facility and service quality ratings: Yes  Education Provided on the Discharge Plan: Yes  Patient/Family in Agreement with the Plan:      Referrals Placed by CM/SW: External Care Coordination  Pending:     [employer] [facility]  [address] [place] [address]  PH: [phone]  FAX: [phone]  7/4- no one in admissions d/t Holiday.  7/3: Initial referrals sent via [email or web address] [facility]  [address] [place] [address]  PH: [phone]  FAX: [phone]  7/4- no one in admissions d/t Holiday.  7/3: Initial referrals sent via [email or web address]     Declined:  [facility]  7/3: Pt is being followed  7/3: Declined due to long wait list, recommend looking into community TCUs  Private pay costs discussed: Not applicable    Additional Information:  SW followed up on TCU referrals, but no one was in admissions d/t Holiday. Will ask unit SW to f/u Wednesday (7/5/23).    Care Management will continue to follow for discharge planning.    BRENDON Neville, [facility]   7/4/2023       Social Work and Care Management Department     SEARCHABLE in [facility] - search SOCIAL WORK     [facility] (0800 - 1630) Saturday and Sunday   Units: 4A, 4C, & 4E Pager: [phone]   Units: 5A & 5B Pager: [phone]   Units: 6A & 6B   Pager: [phone]   Units: 6C & 6D Pager: [phone]   Units: 7A &7B  Pager: [phone]   Units: 7C, 7D, & 5C Pager: [phone]   Unit: [facility] ED Pager: [phone]      [facility] (0800-1630)  Saturday and Sunday    Units: 5 Ortho, 5 Med/Surg & WB ED  Pager: [phone]   Units: 6 Med/Surg, 8A, & 10A ICU  Pager: [phone]     After hours (1630 - 0000) Saturday & Sunday; (0906-5518) Mon-Fri; (0082-0348) [facility] Recognized Holidays     Units: ALL  Pager: [phone]

## 2023-07-04 NOTE — PLAN OF CARE
VS:     Pt A/O X 4. Afebrile. VSS on RA. Lungs- clear bilaterally with both anterior and lateral. IS encouraged. Denies nausea, shortness of breath, and chest pain.     Output:     Bowels- active in all four quadrants. Small BM this AM, last normal BM 6/28 per pt report. Voids spontaneously without   difficulty in the bathroom and bedside urinal.      Activity:     Pt up with 1x assist with walker and GB.     Skin: Surgical incision to R hip, wounds to L ankle and toe, scattered scabs.     Pain:     Has intermittent pain and given scheduled tylenol, ICE applied, and is tolerating well.      CMS:     Baseline neuropathy.      Dressing:     Surgical incisional dressing is CDI and wound dressings changed this shift.      Diet:     Pt is on a regular diet and appetite was good this shift. BG's 181, 156, and 137 this shift.     LDA:     PIV is patent in the L forearm and is SL.      Equipment:     IV pole, PCD's, pillow abductor.     Plan:     Awaiting TCU placement, IM, ortho, and SW following. Pt is able to make needs known and the call light is within the pt's reach. Continue to monitor.       Additional Info:

## 2023-07-04 NOTE — PROGRESS NOTES
Orthopaedic Surgery Progress Note 07/04/2023    Interval Events:  -NAEON  -Awaiting TCU placement    S: No acute events overnight.  Pain  Controlled. Worked with therapy yesterday, which he felt went well. Overall feels well. Tolerating oral diet. + flatus, no BM. Voiding independently.     O:  Temp: 98.5  F (36.9  C) Temp src: Oral BP: (!) 150/60 Pulse: 80   Resp: 18 SpO2: 97 % O2 Device: None (Room air)      Exam:  Gen: No acute distress, resting comfortably in bed.  Resp: Non-labored breathing  MSK:  RLE:  - Dressings c/d/i  - SILT tibial/sural/saphenous/DP/SP nerves  - Fires Quad, TA, EHL, FHL, GaSC  - PT/DP pulses 2+, foot wwp    Recent Labs   Lab 07/02/23  0648 07/01/23  0638 06/30/23  0622   WBC 5.8 6.3 6.8   HGB 10.2* 10.3* 12.4*   * 127* 132*     Assessment: Amos Walker is a 78 year old male who sustained a right femoral neck fracture now s/p right hip hemiarthroplasty on 6/30 with Dr. Keller. Doing well.    Plan:  Medicine Primary  Activity: Up with assist, posterior hip precautions   Weight bearing status: WBAT RLE.  Antibiotics: continue periop Ancef x 24 hours  Diet: Begin with clear fluids and progress diet as tolerated.  DVT prophylaxis: Ok to resume PTA aspirin and plavix on POD1 from orthopedic perspective, will defer to medicine team.  Mechanical while in the hospital.  Bracing/Splinting: abduction pillow to be worn while in bed   Wound Care: keep dressing c/d/i x 7 days.   Pain management: utilize all oral meds first, utilize IV meds for severe breakthrough pain after PO meds given adequate time to take effect  X-rays: Complete  Physical Therapy: Eval and treat  Occupational Therapy: Eval and treat   Labs: Trend Hgb on POD #1, 2, 3  Disposition: Ready for discharge from ortho perspective once cleared by PT/OT  Follow-up: Clinic with Dr. Keller in 2 weeks (on or around 7/14) with no x-rays needed.    Orthopedics will continue to follow peripherally. Please page with any concerns,  questions, or changes in exam.    --  Daryl García MD, PhD  Orthopedic Surgery PGY-1  [phone]    Please page me directly with any questions/concerns during regular weekday hours before 5pm. If there is no response, if it is a weekend, or if it is during evening hours, then please page the orthopedic surgery resident on call.

## 2023-07-05 ENCOUNTER — APPOINTMENT (OUTPATIENT)
Dept: OCCUPATIONAL THERAPY | Facility: CLINIC | Age: 78
DRG: 522 | End: 2023-07-05
Payer: COMMERCIAL

## 2023-07-05 ENCOUNTER — APPOINTMENT (OUTPATIENT)
Dept: PHYSICAL THERAPY | Facility: CLINIC | Age: 78
DRG: 522 | End: 2023-07-05
Payer: COMMERCIAL

## 2023-07-05 ENCOUNTER — TELEPHONE (OUTPATIENT)
Dept: ORTHOPEDICS | Facility: CLINIC | Age: 78
End: 2023-07-05
Payer: COMMERCIAL

## 2023-07-05 LAB
ANION GAP SERPL CALCULATED.3IONS-SCNC: 9 MMOL/L (ref 7–15)
BUN SERPL-MCNC: 32.8 MG/DL (ref 8–23)
CALCIUM SERPL-MCNC: 8.6 MG/DL (ref 8.8–10.2)
CHLORIDE SERPL-SCNC: 98 MMOL/L (ref 98–107)
CREAT SERPL-MCNC: 1.13 MG/DL (ref 0.67–1.17)
DEPRECATED HCO3 PLAS-SCNC: 26 MMOL/L (ref 22–29)
ERYTHROCYTE [DISTWIDTH] IN BLOOD BY AUTOMATED COUNT: 12.4 % (ref 10–15)
GFR SERPL CREATININE-BSD FRML MDRD: 67 ML/MIN/1.73M2
GLUCOSE BLDC GLUCOMTR-MCNC: 189 MG/DL (ref 70–99)
GLUCOSE BLDC GLUCOMTR-MCNC: 196 MG/DL (ref 70–99)
GLUCOSE BLDC GLUCOMTR-MCNC: 197 MG/DL (ref 70–99)
GLUCOSE BLDC GLUCOMTR-MCNC: 243 MG/DL (ref 70–99)
GLUCOSE BLDC GLUCOMTR-MCNC: 313 MG/DL (ref 70–99)
GLUCOSE SERPL-MCNC: 206 MG/DL (ref 70–99)
HCT VFR BLD AUTO: 31.5 % (ref 40–53)
HGB BLD-MCNC: 10.5 G/DL (ref 13.3–17.7)
MCH RBC QN AUTO: 32 PG (ref 26.5–33)
MCHC RBC AUTO-ENTMCNC: 33.3 G/DL (ref 31.5–36.5)
MCV RBC AUTO: 96 FL (ref 78–100)
PLATELET # BLD AUTO: 185 10E3/UL (ref 150–450)
POTASSIUM SERPL-SCNC: 4.3 MMOL/L (ref 3.4–5.3)
RBC # BLD AUTO: 3.28 10E6/UL (ref 4.4–5.9)
SODIUM SERPL-SCNC: 133 MMOL/L (ref 136–145)
WBC # BLD AUTO: 5.6 10E3/UL (ref 4–11)

## 2023-07-05 PROCEDURE — 250N000013 HC RX MED GY IP 250 OP 250 PS 637: Performed by: INTERNAL MEDICINE

## 2023-07-05 PROCEDURE — 250N000013 HC RX MED GY IP 250 OP 250 PS 637: Performed by: STUDENT IN AN ORGANIZED HEALTH CARE EDUCATION/TRAINING PROGRAM

## 2023-07-05 PROCEDURE — 85027 COMPLETE CBC AUTOMATED: CPT | Performed by: INTERNAL MEDICINE

## 2023-07-05 PROCEDURE — 97535 SELF CARE MNGMENT TRAINING: CPT | Mod: GO

## 2023-07-05 PROCEDURE — 80048 BASIC METABOLIC PNL TOTAL CA: CPT | Performed by: INTERNAL MEDICINE

## 2023-07-05 PROCEDURE — 97530 THERAPEUTIC ACTIVITIES: CPT | Mod: GP

## 2023-07-05 PROCEDURE — 99232 SBSQ HOSP IP/OBS MODERATE 35: CPT | Performed by: INTERNAL MEDICINE

## 2023-07-05 PROCEDURE — 97530 THERAPEUTIC ACTIVITIES: CPT | Mod: GO

## 2023-07-05 PROCEDURE — 97110 THERAPEUTIC EXERCISES: CPT | Mod: GP

## 2023-07-05 PROCEDURE — 97116 GAIT TRAINING THERAPY: CPT | Mod: GP

## 2023-07-05 PROCEDURE — 120N000002 HC R&B MED SURG/OB UMMC

## 2023-07-05 PROCEDURE — 36415 COLL VENOUS BLD VENIPUNCTURE: CPT | Performed by: INTERNAL MEDICINE

## 2023-07-05 RX ADMIN — GENTAMICIN SULFATE: 1 CREAM TOPICAL at 12:33

## 2023-07-05 RX ADMIN — Medication 25 MCG: at 21:53

## 2023-07-05 RX ADMIN — ACETAMINOPHEN 975 MG: 325 TABLET, FILM COATED ORAL at 18:21

## 2023-07-05 RX ADMIN — INSULIN ASPART 2 UNITS: 100 INJECTION, SOLUTION INTRAVENOUS; SUBCUTANEOUS at 18:18

## 2023-07-05 RX ADMIN — CALCIUM 500 MG: 500 TABLET ORAL at 08:21

## 2023-07-05 RX ADMIN — INSULIN ASPART 3 UNITS: 100 INJECTION, SOLUTION INTRAVENOUS; SUBCUTANEOUS at 12:37

## 2023-07-05 RX ADMIN — SENNOSIDES AND DOCUSATE SODIUM 1 TABLET: 50; 8.6 TABLET ORAL at 21:53

## 2023-07-05 RX ADMIN — CETIRIZINE HYDROCHLORIDE 10 MG: 10 TABLET, FILM COATED ORAL at 08:21

## 2023-07-05 RX ADMIN — OXYCODONE HYDROCHLORIDE AND ACETAMINOPHEN 500 MG: 500 TABLET ORAL at 08:21

## 2023-07-05 RX ADMIN — INSULIN ASPART 2 UNITS: 100 INJECTION, SOLUTION INTRAVENOUS; SUBCUTANEOUS at 08:31

## 2023-07-05 RX ADMIN — Medication 25 MCG: at 08:21

## 2023-07-05 RX ADMIN — ACETAMINOPHEN 975 MG: 325 TABLET, FILM COATED ORAL at 08:21

## 2023-07-05 RX ADMIN — CALCIUM 500 MG: 500 TABLET ORAL at 21:53

## 2023-07-05 RX ADMIN — CLOPIDOGREL BISULFATE 75 MG: 75 TABLET, FILM COATED ORAL at 08:21

## 2023-07-05 RX ADMIN — SIMVASTATIN 40 MG: 40 TABLET, FILM COATED ORAL at 21:53

## 2023-07-05 ASSESSMENT — ACTIVITIES OF DAILY LIVING (ADL)
ADLS_ACUITY_SCORE: 34
ADLS_ACUITY_SCORE: 36
ADLS_ACUITY_SCORE: 34
ADLS_ACUITY_SCORE: 36
ADLS_ACUITY_SCORE: 34
ADLS_ACUITY_SCORE: 36
ADLS_ACUITY_SCORE: 34

## 2023-07-05 NOTE — PROGRESS NOTES
SPIRITUAL HEALTH SERVICES Progress Note  [facility] ([facility]) 5 Ortho    Visited pt per length of stay. I introduced myself to pt and oriented him to SHS. Pt said he has no spiritual needs at this time. I informed pt how to request for SHS if the need arises and SHS will remain available.     Trish Fernandez   Intern  Pager [phone]      * SHS remains available 24/7 for emergent requests/referrals, either by having the switchboard page the on-call  or by entering an ASAP/STAT consult in Epic (this will also page the on-call ). Routine Epic consults receive an initial response within 24 hours.*

## 2023-07-05 NOTE — PROGRESS NOTES
7/5/23    Care Management Follow Up    UPDATE: CHW spoke to pt @ 1400 to add more TCUs to the list. PT wanted CHW to talk with his wife through his phone regarding adding a couple more TCUs to the list. She agreed and mentioned the two at the bottom of the list.     CHW followed up on the following TCU:    Accepting:    [facility]  [address] [place] [address]  PH: [phone]  7/5: Initial referrals sent via Epic  7/5: Pt was accepted and they had accepted to go their. Their health insurance will be ran.    Pending:     [facility]  [address] [place] [address]  PH: [phone]  FAX: [phone]  7/3: Initial referrals sent via [employer]  7/5: CHW LVM to call back     [facility]  [address] [place] [address]  PH: [phone]  FAX: [phone]  7/3: Initial referrals sent via [employer]  7/5: CHW LVM to call back     [facility] [address] [address]  PH: [phone]  7/5: Initial referrals sent via [employer]    Declined:  [facility]  7/3: Pt is being followed  7/3: Declined due to long wait list, recommend looking into community TCUs    Windy Esquivel  Inpatient CHW  [facility] 5 Ortho, 8 Med Surge, & ED

## 2023-07-05 NOTE — PLAN OF CARE
Patient A/Ox4. VSS ex BP slightly elevated. Denies CP, SOB, dizziness/LH. LSCTA. +fl/BS. Voiding well in urinal. CMS intact. Dressing to hip CDI, peeling just a bit. Tolerating regular diet without NV, diabetic. Activity level is good, pt is 1-2 assist with walker. IV SL. Pain rated as comfortably managed throughout shift, only takes scheduled tylenol. SW following for TCU placement. Patient has demonstrated ability to call appropriately. Patient is resting with call light within reach. Will continue to monitor.

## 2023-07-05 NOTE — PROGRESS NOTES
Wound care was done today on the L heel and Left toe.  Followed WOC direction orders.  Applied lotion to BLE.    No discharge noted on dressings removed

## 2023-07-05 NOTE — PROGRESS NOTES
"  VS: BP (!) 143/66   Pulse 88   Temp 98.2  F (36.8  C) (Oral)   Resp 16   Ht 1.88 m (6' 2\")   Wt 76.4 kg (168 lb 6.9 oz)   SpO2 96%   BMI 21.63 kg/m     O2: RA   Output: Voids in urinal   Last BM: 7/3   Activity: One assist w/ walker & gait belt   Skin: Intact except for R hip incision, heels, scabs, L foot & toe sore with wound care   Pain: intermittent   CMS: Neuropathy w/ BLE   Dressing: R hip dressing   Diet: reg   LDA: PIV in LFA   Equipment: Gait belt & walker   Plan: Discharge to TCU pending bed placement   Additional Info:      Wound care was done today on his L heel and toe.    Worked with PT today and did well.   "

## 2023-07-05 NOTE — TELEPHONE ENCOUNTER
Apt scheduled with Dr. Keller on 7/19. Pt is still in-patient. Apt will appear on discharge note.        -SHANTANU Horton- Orthopedics    ----- Message from Anna Fagan ATC sent at 7/5/2023 11:35 AM CDT -----    ----- Message -----  From: Ann Paula PA-C  Sent: 7/5/2023  11:29 AM CDT  To: [facility],    Noticing this patient needs a follow-up scheduled with Dr. Cruz around 7/13 for 2 week wound check.     Thanks!  Ann

## 2023-07-05 NOTE — PROGRESS NOTES
[facility]    Medicine Progress Note - Hospitalist Service, GOLD TEAM 18    Date of Admission:  6/29/2023    Assessment & Plan   A: Patient is a 79 y/o man who has multiple medical problems including coronary artery disease, diabetes mellitus type II, hypertension, peripheral artery disease, chronic kidney disease stage III and emphysematous changes of lung noted on CT. Patient presented after a ground level fall with right hip pain and was found to have a right femoral neck fracture. Patient underwent right hip hemiarthroplasty on 30-Jun-2023.     P:  1.) Right femoral neck fracture s/p right hip hemiarthroplasty on 30-Jun-2023:  - Resumed scheduled acetaminophen for pain control.  - Patient on acetaminophen, IV hydromorphone and PO oxycodone as needed.     2.) Diabetes mellitus type II complicated by neuropathy and chronic kidney disease; patient on trulicity and mealtime insulin as outpatient:  - Patient to continue mealtime insulin and insulin sliding scale.  - After discussion with patient, if patient's wife unable to bring in trulicity today, will start long-acting insulin at bedtime.     3.) Post-operative acute blood loss anemia; patient has chronic anemia at baseline:  - No indication for transfusion at present.  - Will transfuse for Hb < 7     4.) Thrombocytopenia, resolved for now:  - Monitoring for bleeding.     5.) Chronic kidney disease, reportedly stage III, likely stage IIIa:  - Avoiding nephrotoxins.     6.) Hypertension:  - Blood pressure controlled off lisinopril. Monitoring.     7.) Coronary artery disease, patient asymptomatic:  - Patient on aspirin and plavix.     8.) Emphysema on CT chest, patient had normal pulmonary function testing in 2020; likely secondary to smoking:  - Monitoring for symptoms. Further monitoring as outpatient.     9.) Hyperlipidemia:  - Patient on simvastatin.     10.) MGUS:  - To f/u as outpatient.     11.) Osteoporosis:   -  Patient usually on alendronate but this is currently on hold. Anticipate resumption as outpatient.     12.) Hyponatremia; hypocalcemia:  - Monitoring labs as indicated.        Diet: Advance Diet as Tolerated: Regular Diet Adult  Diet    DVT Prophylaxis: Pneumatic compression device ordered.  Daniel Catheter: Not present  Lines: None     Cardiac Monitoring: None  Code Status: Full Code      Clinically Significant Risk Factors              # Hypoalbuminemia: Lowest albumin = 3 g/dL at 7/2/2023  6:48 AM, will monitor as appropriate     # Hypertension: Noted on problem list                   Jj Flores MD  Hospitalist Service, GOLD TEAM 18  [facility]  Securely message with [employer] (more info)  Text page via [employer] Paging/Directory   See signed in provider for up to date coverage information  ______________________________________________________________________    Interval History     Patient noted some right groin discomfort overnight. Patient noted no other pain. Patient stated that his wife might be visiting today and might be bringing in his trulicity.     Physical Exam   Vital Signs: Temp: 98.2  F (36.8  C) Temp src: Oral BP: (!) 143/66 Pulse: 88   Resp: 16 SpO2: 96 % O2 Device: None (Room air)    Weight: 168 lbs 6.9 oz    General: Patient comfortable, NAD.  Heart: RRR, S1 S2 w/o murmurs.  Lungs: Breath sounds present. No crackles/wheezes heard.  Abdomen: Soft, nontender.    Labs noted.  Sodium 133; Potassium 4.3; Creatinine 1.13  WBC 5.6; Hb 10.5; Platelets 185      Medical Decision Making

## 2023-07-06 ENCOUNTER — APPOINTMENT (OUTPATIENT)
Dept: OCCUPATIONAL THERAPY | Facility: CLINIC | Age: 78
DRG: 522 | End: 2023-07-06
Payer: COMMERCIAL

## 2023-07-06 ENCOUNTER — APPOINTMENT (OUTPATIENT)
Dept: PHYSICAL THERAPY | Facility: CLINIC | Age: 78
DRG: 522 | End: 2023-07-06
Payer: COMMERCIAL

## 2023-07-06 LAB
GLUCOSE BLDC GLUCOMTR-MCNC: 158 MG/DL (ref 70–99)
GLUCOSE BLDC GLUCOMTR-MCNC: 187 MG/DL (ref 70–99)
GLUCOSE BLDC GLUCOMTR-MCNC: 193 MG/DL (ref 70–99)
GLUCOSE BLDC GLUCOMTR-MCNC: 197 MG/DL (ref 70–99)
GLUCOSE BLDC GLUCOMTR-MCNC: 207 MG/DL (ref 70–99)

## 2023-07-06 PROCEDURE — 250N000013 HC RX MED GY IP 250 OP 250 PS 637: Performed by: INTERNAL MEDICINE

## 2023-07-06 PROCEDURE — 250N000013 HC RX MED GY IP 250 OP 250 PS 637: Performed by: STUDENT IN AN ORGANIZED HEALTH CARE EDUCATION/TRAINING PROGRAM

## 2023-07-06 PROCEDURE — 97110 THERAPEUTIC EXERCISES: CPT | Mod: GP

## 2023-07-06 PROCEDURE — 120N000002 HC R&B MED SURG/OB UMMC

## 2023-07-06 PROCEDURE — 97530 THERAPEUTIC ACTIVITIES: CPT | Mod: GO

## 2023-07-06 PROCEDURE — 97530 THERAPEUTIC ACTIVITIES: CPT | Mod: GP

## 2023-07-06 PROCEDURE — 99232 SBSQ HOSP IP/OBS MODERATE 35: CPT | Performed by: INTERNAL MEDICINE

## 2023-07-06 PROCEDURE — 250N000012 HC RX MED GY IP 250 OP 636 PS 637: Performed by: INTERNAL MEDICINE

## 2023-07-06 PROCEDURE — 97535 SELF CARE MNGMENT TRAINING: CPT | Mod: GO

## 2023-07-06 RX ADMIN — INSULIN ASPART 1 UNITS: 100 INJECTION, SOLUTION INTRAVENOUS; SUBCUTANEOUS at 18:20

## 2023-07-06 RX ADMIN — Medication 25 MCG: at 19:56

## 2023-07-06 RX ADMIN — ACETAMINOPHEN 975 MG: 325 TABLET, FILM COATED ORAL at 08:30

## 2023-07-06 RX ADMIN — ACETAMINOPHEN 975 MG: 325 TABLET, FILM COATED ORAL at 00:57

## 2023-07-06 RX ADMIN — Medication 25 MCG: at 08:32

## 2023-07-06 RX ADMIN — SENNOSIDES AND DOCUSATE SODIUM 1 TABLET: 50; 8.6 TABLET ORAL at 19:56

## 2023-07-06 RX ADMIN — OXYCODONE HYDROCHLORIDE AND ACETAMINOPHEN 500 MG: 500 TABLET ORAL at 08:31

## 2023-07-06 RX ADMIN — CLOPIDOGREL BISULFATE 75 MG: 75 TABLET, FILM COATED ORAL at 08:31

## 2023-07-06 RX ADMIN — CALCIUM 500 MG: 500 TABLET ORAL at 19:56

## 2023-07-06 RX ADMIN — ASPIRIN 81 MG: 81 TABLET, COATED ORAL at 08:31

## 2023-07-06 RX ADMIN — SIMVASTATIN 40 MG: 40 TABLET, FILM COATED ORAL at 22:13

## 2023-07-06 RX ADMIN — ACETAMINOPHEN 975 MG: 325 TABLET, FILM COATED ORAL at 16:09

## 2023-07-06 RX ADMIN — INSULIN GLARGINE 5 UNITS: 100 INJECTION, SOLUTION SUBCUTANEOUS at 22:13

## 2023-07-06 RX ADMIN — CALCIUM 500 MG: 500 TABLET ORAL at 08:31

## 2023-07-06 RX ADMIN — ACETAMINOPHEN 975 MG: 325 TABLET, FILM COATED ORAL at 23:05

## 2023-07-06 RX ADMIN — INSULIN ASPART 2 UNITS: 100 INJECTION, SOLUTION INTRAVENOUS; SUBCUTANEOUS at 13:02

## 2023-07-06 RX ADMIN — INSULIN ASPART 2 UNITS: 100 INJECTION, SOLUTION INTRAVENOUS; SUBCUTANEOUS at 08:37

## 2023-07-06 RX ADMIN — SENNOSIDES AND DOCUSATE SODIUM 1 TABLET: 50; 8.6 TABLET ORAL at 08:31

## 2023-07-06 ASSESSMENT — ACTIVITIES OF DAILY LIVING (ADL)
ADLS_ACUITY_SCORE: 36

## 2023-07-06 NOTE — PROGRESS NOTES
[facility]    Medicine Progress Note - Hospitalist Service, GOLD TEAM 18    Date of Admission:  6/29/2023    Assessment & Plan   A: Patient is a 77 y/o man who has multiple medical problems including coronary artery disease, diabetes mellitus type II, hypertension, peripheral artery disease, chronic kidney disease stage III and emphysematous changes of lung noted on CT. Patient presented after a ground level fall with right hip pain and was found to have a right femoral neck fracture. Patient underwent right hip hemiarthroplasty on 30-Jun-2023.     P:  1.) Right femoral neck fracture s/p right hip hemiarthroplasty on 30-Jun-2023:  - Resumed scheduled acetaminophen for pain control.  - Patient on acetaminophen, IV hydromorphone and PO oxycodone as needed.     2.) Diabetes mellitus type II complicated by neuropathy and chronic kidney disease; patient on trulicity and mealtime insulin as outpatient:  - Patient to continue mealtime insulin and insulin sliding scale.  - As trulicty is not available, will start lantus 5 units every evening.     3.) Post-operative acute blood loss anemia; patient has chronic anemia at baseline:  - No indication for transfusion at present.  - Will transfuse for Hb < 7     4.) Thrombocytopenia, resolved for now:  - Monitoring for bleeding.     5.) Chronic kidney disease, reportedly stage III, likely stage IIIa:  - Avoiding nephrotoxins.     6.) Hypertension:  - Blood pressure controlled off lisinopril. Monitoring.     7.) Coronary artery disease, patient asymptomatic:  - Patient on aspirin and plavix.     8.) Emphysema on CT chest, patient had normal pulmonary function testing in 2020; likely secondary to smoking:  - Monitoring for symptoms. Further monitoring as outpatient.     9.) Hyperlipidemia:  - Patient on simvastatin.     10.) MGUS:  - To f/u as outpatient.     11.) Osteoporosis:   - Patient usually on alendronate but this is currently on  hold. Anticipate resumption as outpatient.     12.) Hyponatremia; hypocalcemia:  - Monitoring labs as needed.          Diet: Advance Diet as Tolerated: Regular Diet Adult  Diet    Daniel Catheter: Not present  Lines: None     Cardiac Monitoring: None  Code Status: Full Code      Clinically Significant Risk Factors              # Hypoalbuminemia: Lowest albumin = 3 g/dL at 7/2/2023  6:48 AM, will monitor as appropriate     # Hypertension: Noted on problem list                 Disposition Plan      Expected Discharge Date: 07/06/2023,  3:00 PM      Discharge Comments: Insurance auth          Jj Flores MD  Hospitalist Service, GOLD TEAM 18  [facility]  Securely message with [employer] (more info)  Text page via [facility] Paging/Directory   See signed in provider for up to date coverage information  ______________________________________________________________________    Interval History     Patient noted pain controlled. Patient noted no new problems.    Physical Exam   Vital Signs: Temp: 98.1  F (36.7  C) Temp src: Oral BP: 120/54 Pulse: 72   Resp: 16 SpO2: 96 % O2 Device: None (Room air)    Weight: 168 lbs 6.9 oz    General: Patient comfortable, NAD.  Heart: RRR, S1 S2 w/o murmurs.  Lungs: Breath sounds present. No crackles/wheezes heard.  Abdomen: Soft, nontender.    Medical Decision Making

## 2023-07-06 NOTE — PLAN OF CARE
"  VS: BP (!) 158/64 (BP Location: Left arm)   Pulse 79   Temp (!) 96.3  F (35.7  C) (Oral)   Resp 20   Ht 1.88 m (6' 2\")   Wt 76.4 kg (168 lb 6.9 oz)   SpO2 96%   BMI 21.63 kg/m       O2: 96% at room air    Output: Voids in urinal at night    Last BM: 07/03/23   Activity: Up with assist of 1 using gait belt and walker   Skin: R hip surgical incision.  L ankle and L foot 2nd toe wound with wound care, MWF.   Pain: Patient complains of intermittent but minimal pain on right hip.   Patient also complains of pain on upper right thigh managed by scheduled tylenol.    CMS: Baseline neuropathy    Dressing: CDI   Diet: Regular. BG at 0200am-187   LDA: PIV in left forearm, saline locked.    Equipment: Gait belt, walker and personal belongings.    Plan: Continue with plan of care. SOM ff TCU referrals.   Additional Info:                             "

## 2023-07-06 NOTE — PLAN OF CARE
Goal Outcome Evaluation:      Plan of Care Reviewed With: patient    Overall Patient Progress: improvingOverall Patient Progress: improving       VS: VSS. Denies CP/SOB. A/O x4.   O2: >90% on RA    Output: Voiding adequate amounts w/o pain or difficulty    Last BM: 7/6/2023   Activity: Assist of 1 with gait belt, walker    Skin: Intact, some scabbing on right foot, some peeling on both feet.    Pain: Minimal pain, pt rates pain 2/10 at surgical incision site on R hip.   CMS: Intact   Dressing: CDI   Diet: Regular, tolerating well.    LDA: PIV in left forearm, saline locked.   Equipment: Gait belt, walker, personal belongings   Plan: Awaiting discharge to TCU, pending insurance coverage.    Additional Info:

## 2023-07-06 NOTE — PROGRESS NOTES
7/6/23    Care Management Follow Up    UPDATE: CHW completed PAS for pt, [id number]. Facility had reached out in the morning @ 0834 and we are still waiting on his health insurance.    CHW followed up on the following TCU:     Accepting:     [facility]  [address] [place] [address]  PH: [phone]  7/5: Initial referrals sent via Epic  7/5: Pt was accepted and they had accepted to go their. Their health insurance will be ran.     Pending:     [facility]  [address] [place] [address]  PH: [phone]  FAX: [phone]  7/3: Initial referrals sent via [employer]  7/5: CHW LVM to call back     [facility]  [address] [place] [address]  PH: [phone]  FAX: [phone]  7/3: Initial referrals sent via [employer]  7/5: CHW LVM to call back      [facility] [address] [address]  PH: [phone]  7/5: Initial referrals sent via [employer]     Declined:  [facility]  7/3: Pt is being followed  7/3: Declined due to long wait list, recommend looking into community TCUs     Windy Esquivel  Inpatient CHW  [facility] 5 Ortho, 8 Med Surge, & ED

## 2023-07-07 ENCOUNTER — APPOINTMENT (OUTPATIENT)
Dept: OCCUPATIONAL THERAPY | Facility: CLINIC | Age: 78
DRG: 522 | End: 2023-07-07
Payer: COMMERCIAL

## 2023-07-07 LAB
GLUCOSE BLDC GLUCOMTR-MCNC: 154 MG/DL (ref 70–99)
GLUCOSE BLDC GLUCOMTR-MCNC: 155 MG/DL (ref 70–99)
GLUCOSE BLDC GLUCOMTR-MCNC: 158 MG/DL (ref 70–99)
GLUCOSE BLDC GLUCOMTR-MCNC: 200 MG/DL (ref 70–99)
GLUCOSE BLDC GLUCOMTR-MCNC: 217 MG/DL (ref 70–99)

## 2023-07-07 PROCEDURE — G0463 HOSPITAL OUTPT CLINIC VISIT: HCPCS

## 2023-07-07 PROCEDURE — 97535 SELF CARE MNGMENT TRAINING: CPT | Mod: GO

## 2023-07-07 PROCEDURE — 120N000002 HC R&B MED SURG/OB UMMC

## 2023-07-07 PROCEDURE — 250N000013 HC RX MED GY IP 250 OP 250 PS 637: Performed by: INTERNAL MEDICINE

## 2023-07-07 PROCEDURE — 250N000013 HC RX MED GY IP 250 OP 250 PS 637: Performed by: STUDENT IN AN ORGANIZED HEALTH CARE EDUCATION/TRAINING PROGRAM

## 2023-07-07 PROCEDURE — 250N000012 HC RX MED GY IP 250 OP 636 PS 637: Performed by: INTERNAL MEDICINE

## 2023-07-07 PROCEDURE — 99232 SBSQ HOSP IP/OBS MODERATE 35: CPT | Performed by: INTERNAL MEDICINE

## 2023-07-07 RX ADMIN — SENNOSIDES AND DOCUSATE SODIUM 1 TABLET: 50; 8.6 TABLET ORAL at 20:56

## 2023-07-07 RX ADMIN — GENTAMICIN SULFATE: 1 CREAM TOPICAL at 09:07

## 2023-07-07 RX ADMIN — INSULIN ASPART 2 UNITS: 100 INJECTION, SOLUTION INTRAVENOUS; SUBCUTANEOUS at 13:43

## 2023-07-07 RX ADMIN — INSULIN ASPART 1 UNITS: 100 INJECTION, SOLUTION INTRAVENOUS; SUBCUTANEOUS at 18:33

## 2023-07-07 RX ADMIN — INSULIN ASPART 1 UNITS: 100 INJECTION, SOLUTION INTRAVENOUS; SUBCUTANEOUS at 07:56

## 2023-07-07 RX ADMIN — CLOPIDOGREL BISULFATE 75 MG: 75 TABLET, FILM COATED ORAL at 07:52

## 2023-07-07 RX ADMIN — Medication 25 MCG: at 07:52

## 2023-07-07 RX ADMIN — CALCIUM 500 MG: 500 TABLET ORAL at 20:56

## 2023-07-07 RX ADMIN — SIMVASTATIN 40 MG: 40 TABLET, FILM COATED ORAL at 20:56

## 2023-07-07 RX ADMIN — ACETAMINOPHEN 975 MG: 325 TABLET, FILM COATED ORAL at 18:31

## 2023-07-07 RX ADMIN — CALCIUM 500 MG: 500 TABLET ORAL at 07:52

## 2023-07-07 RX ADMIN — OXYCODONE HYDROCHLORIDE AND ACETAMINOPHEN 500 MG: 500 TABLET ORAL at 07:51

## 2023-07-07 RX ADMIN — Medication 25 MCG: at 20:56

## 2023-07-07 RX ADMIN — SENNOSIDES AND DOCUSATE SODIUM 1 TABLET: 50; 8.6 TABLET ORAL at 07:50

## 2023-07-07 RX ADMIN — ACETAMINOPHEN 975 MG: 325 TABLET, FILM COATED ORAL at 07:51

## 2023-07-07 RX ADMIN — CETIRIZINE HYDROCHLORIDE 10 MG: 10 TABLET, FILM COATED ORAL at 07:52

## 2023-07-07 ASSESSMENT — ACTIVITIES OF DAILY LIVING (ADL)
ADLS_ACUITY_SCORE: 31
ADLS_ACUITY_SCORE: 35
ADLS_ACUITY_SCORE: 36
ADLS_ACUITY_SCORE: 31
ADLS_ACUITY_SCORE: 31
ADLS_ACUITY_SCORE: 36
ADLS_ACUITY_SCORE: 31
ADLS_ACUITY_SCORE: 31
ADLS_ACUITY_SCORE: 36
ADLS_ACUITY_SCORE: 36
ADLS_ACUITY_SCORE: 31
ADLS_ACUITY_SCORE: 31

## 2023-07-07 NOTE — PLAN OF CARE
"1900-[address]30  /54   Pulse 69   Temp 98.3  F (36.8  C) (Oral)   Resp 16   Ht 1.88 m (6' 2\")   Wt 76.4 kg (168 lb 6.9 oz)   SpO2 100%   BMI 21.63 kg/m        AOx4, ORA. Denies CP, SOB, N/V. Rampart bilat, hearing aids at bedside. B/L neuropathy all extremities.     Pain managed well with sched Tylenol, declined PRNs and ICE. R hip dressing CDI. L toe and heel wounds, dressing CDI.     A1 FWW GB.     Reg diet, tolerating. Reminders to call prior to eating. BG checks, sliding scale. LBM 7/6. Voiding adequately using bedside urinal.    L PIV SL.     Plan: TCU placement.    Cont precaut maintained. Pt able to make needs known via call light. Care ongoing.     "

## 2023-07-07 NOTE — PROGRESS NOTES
7/7/23    Care Management Follow Up    UPDATE: CHW got a call from [facility] to notify us that his health insurance [employer] had denied because pt does not meet the criteria. The option that [facility] gave us was for the pt to go home with Home Care. This was notified to the pt and they wanted CHW to call his wife Danielle. CHW called wife ([phone]) and she was not happy with the situation. She wants her  to go to a TCU because she won't be much help for him because she also has her own health problems. Dr Flores will do a per to per ([phone]) to try to get pt into TCU. Case number is [id number].    CHW called admissions ([phone]) and told them to hold off until Monday. They were fine with it.    CHW got news that [employer] has approved for seven stay. Discharge for Monday morning.     CHW followed up on the following TCU:     Accepting:     [facility]  [address] [place] [address]  PH: [phone]  7/5: Initial referrals sent via Epic  7/5: Pt was accepted and they had accepted to go their. Their health insurance will be ran.     Pending:     [facility]  [address] [place] [id number]  PH: [phone]  FAX: [phone]  7/3: Initial referrals sent via [employer]  7/5: CHW LV to call back     [facility]  [address] [place] [address]  PH: [phone]  FAX: [phone]  7/3: Initial referrals sent via [employer]  7/5: CHW LVM to call back      [address] [address]  PH: [phone]  7/5: Initial referrals sent via [employer]     Declined:  [facility]  7/3: Pt is being followed  7/3: Declined due to long wait list, recommend looking into community TCUs     Windy Esquivel  Inpatient CHW  [facility] 5 Ortho, 8 Med Surge, & ED

## 2023-07-07 NOTE — PROGRESS NOTES
Care Management Follow Up    Length of Stay (days): 8    Expected Discharge Date: 07/10/23    - SW to update SLL of discharge date.     Concerns to be Addressed: discharge planning; [employer] insurance auth  Patient plan of care discussed at interdisciplinary rounds: Yes    Anticipated Discharge Disposition: Transitional Care    [facility]  [address] [place] [address]  PH: [phone]     Anticipated Discharge Services: Post acute therapies  Anticipated Discharge DME: None    Patient/family educated on Medicare website which has current facility and service quality ratings: no  Education Provided on the Discharge Plan: Yes  Patient/Family in Agreement with the Plan:  Yes    Referrals Placed by CM/SW: External Care Coordination; Post acute facilities  Private pay costs discussed: Not at this time.    Additional Information:  Dr. Flores updated RNCC that [employer] approved pt for SNF for 7 days initially; no auth # at this time, as [employer] will update their system.    CHW called [facility], provided the above information and inquired about TCU admission. [facility] unable to accept pt this late in the day, also do not do weekend admissions; agreeable to pt admitting on Monday.    CHW updated pt of discharge plan. SW left VM for pt's spouse re: discharge plan    Pt will be added to weekend help list for IMM and to set-up transportation (if family unable) for Monday (07/10/23) morning and update IDT of discharge time.      1620 ADDENDUM: Pt's spouse, Danielle, called back SOM re: discharge plan. Danielle agreed that pt will need WC transportation upon discharge. SOM discussed OOP costs for [facility] Transportation WC ride, which Danielle was agreeable to. Danielle aware that ride will not get set-up until this weekend, as time wise it will be closer to Monday. CM to update Danielle of pt's discharge time.          ALEXSANDRA MonsalveW, LSW  5 Ortho & WB ED   PHONE: [phone]  Pager: [phone]

## 2023-07-07 NOTE — PROGRESS NOTES
[facility]  WOC Nurse Inpatient Assessment     Consulted for: left ankle and toe     Summary: follows podiatry outpatient. (last visit with Dr. Mcclain 6/20)    Patient History (according to provider note(s):      Amos Walker is a 78 year old male who sustained a right femoral neck fracture now s/p right hip hemiarthroplasty on 6/30 with Dr. Keller.    Assessment:      Areas visualized during today's visit: feet    Wound location: left foot    Left lateral ankle    Left 2nd toe     Last photo: 6/30  Wound due to: Diabetic Ulcer, Neuropathic Ulcer and PVD  Wound history/plan of care: sees podiatry outpatient   Wound base: 100 % callous or dried drainage     Palpation of the wound bed: firm      Drainage: none     Description of drainage: none     Measurements (length x width x depth, in cm): ankle 0.8  x 0.6 x  0 cm & toe 0.6 x 0.4 x 0.3 cm      Tunneling: N/A     Undermining: N/A  Periwound skin: Erythema- blanchable      Color: pink and red      Temperature: normal   Odor: none  Pain: denies , none  Pain interventions prior to dressing change: N/A  Treatment goal: Infection control/prevention and Protection  STATUS: improving  Supplies ordered: at bedside     Treatment Plan:     Left ankle and 2nd toe wound(s): Monday/Wednesday/Friday cleanse with saline and pat dry. Apply Gentamicin (per MAR) to wounds. Cut Aquacel Ag (order#[id number]) to fit wound. Gently moisten with a few drops of saline. Conform mepilex or Band-Aid over wounds.     Orders: Reviewed    RECOMMEND PRIMARY TEAM ORDER: None, at this time  Education provided: plan of care and wound progress  Discussed plan of care with: Patient and Nurse  WOC nurse follow-up plan: weekly  Notify WOC if wound(s) deteriorate.  Nursing to notify the Provider(s) and re-consult the WOC Nurse if new skin concern.    DATA:     Current support surface: Standard  Standard gel/foam mattress (IsoFlex, Atmos air, etc)  Containment  of urine/stool: Incontinent pad in bed  BMI: Body mass index is 21.63 kg/m .   Active diet order: Orders Placed This Encounter      Advance Diet as Tolerated: Regular Diet Adult      Diet     Output: I/O last 3 completed shifts:  In: -   Out: 2500 [Urine:2500]     Labs:   Recent Labs   Lab 07/05/23  0649 07/02/23  0648   ALBUMIN  --  3.0*   HGB 10.5* 10.2*   WBC 5.6 5.8     Pressure injury risk assessment:   Sensory Perception: 3-->slightly limited  Moisture: 4-->rarely moist  Activity: 3-->walks occasionally  Mobility: 3-->slightly limited  Nutrition: 3-->adequate  Friction and Shear: 3-->no apparent problem  Yoel Score: 19    Beryl Gonsalez RN CWOCN   Pager no longer is use, please contact through Epiclist group: [facility] Nurse  Dept. Office Number: [phone]

## 2023-07-07 NOTE — PROGRESS NOTES
[facility]    Medicine Progress Note - Hospitalist Service, GOLD TEAM 18    Date of Admission:  6/29/2023    Assessment & Plan   A: Patient is a 77 y/o man who has multiple medical problems including coronary artery disease, diabetes mellitus type II, hypertension, peripheral artery disease, chronic kidney disease stage III and emphysematous changes of lung noted on CT. Patient presented after a ground level fall with right hip pain and was found to have a right femoral neck fracture. Patient underwent right hip hemiarthroplasty on 30-Jun-2023.     P:  1.) Right femoral neck fracture s/p right hip hemiarthroplasty on 30-Jun-2023:  - Resumed scheduled acetaminophen for pain control.  - Patient on acetaminophen, IV hydromorphone and PO oxycodone as needed.     2.) Diabetes mellitus type II complicated by neuropathy and chronic kidney disease; patient on trulicity and mealtime insulin as outpatient:  - Patient to continue mealtime insulin and insulin sliding scale.  - As trulicty is not curently available, patient is on lantus. Will increase lantus to 8 units every evening.     3.) Post-operative acute blood loss anemia; patient has chronic anemia at baseline:  - No indication for transfusion at present.  - Will transfuse for Hb < 7     4.) Thrombocytopenia, resolved for now:  - Monitoring for bleeding.     5.) Chronic kidney disease, reportedly stage III, likely stage IIIa:  - Avoiding nephrotoxins.     6.) Hypertension:  - Blood pressure controlled off lisinopril. Monitoring.     7.) Coronary artery disease, patient asymptomatic:  - Patient on aspirin and plavix.     8.) Emphysema on CT chest, patient had normal pulmonary function testing in 2020; likely secondary to smoking:  - Monitoring for symptoms. Further monitoring as outpatient.     9.) Hyperlipidemia:  - Patient on simvastatin.     10.) MGUS:  - To f/u as outpatient.     11.) Osteoporosis:   - Patient usually on  alendronate but this is currently on hold. Anticipate resumption as outpatient.     12.) Hyponatremia; hypocalcemia:  - Monitoring labs as needed.       Diet: Advance Diet as Tolerated: Regular Diet Adult  Diet    Daniel Catheter: Not present  Lines: None     Cardiac Monitoring: None  Code Status: Full Code      Clinically Significant Risk Factors              # Hypoalbuminemia: Lowest albumin = 3 g/dL at 7/2/2023  6:48 AM, will monitor as appropriate     # Hypertension: Noted on problem list                 Disposition Plan      Expected Discharge Date: 07/07/2023,  3:00 PM      Discharge Comments: Insurance auth          Jj Flores MD  Hospitalist Service, GOLD TEAM 18  [facility]  Securely message with [employer] (more info)  Text page via [facility] Paging/Directory   See signed in provider for up to date coverage information  ______________________________________________________________________    Interval History     Patient noted pain controlled. Patient noted no new problems.    Physical Exam   Vital Signs: Temp: 97.8  F (36.6  C) Temp src: Oral BP: 134/62 Pulse: 79   Resp: 16 SpO2: 96 % O2 Device: None (Room air)    Weight: 168 lbs 6.9 oz    General: Patient comfortable, NAD.  Heart: RRR, S1 S2 w/o murmurs.  Lungs: Breath sounds present. No crackles/wheezes heard.  Abdomen: Soft, nontender.    Medical Decision Making             Data

## 2023-07-07 NOTE — PROGRESS NOTES
"  VS: /62   Pulse 79   Temp 97.8  F (36.6  C) (Oral)   Resp 16   Ht 1.88 m (6' 2\")   Wt 76.4 kg (168 lb 6.9 oz)   SpO2 96%   BMI 21.63 kg/m     O2: Stable on RA denied SOB   Output: Continent of b/b, voids in beside urinal   Last BM: 7/6/23   Activity: A1 w/walker & gait belt   Skin: Surgical incision R hip, L ankle & foot 2nd toe wound care done by WOC today, scheduled MWF   Pain: Managed with scheduled tylenol   CMS: A/O x4, baseline neuropathy on all extremities   Dressing: CDI   Diet: Reg/thin, blood sugars before meals & at bedtime   LDA: L forearm PIV SL   Equipment: Personal belongings   Plan: Awaiting placement & insurance approval   Additional Info:          "

## 2023-07-08 ENCOUNTER — APPOINTMENT (OUTPATIENT)
Dept: PHYSICAL THERAPY | Facility: CLINIC | Age: 78
DRG: 522 | End: 2023-07-08
Payer: COMMERCIAL

## 2023-07-08 ENCOUNTER — APPOINTMENT (OUTPATIENT)
Dept: OCCUPATIONAL THERAPY | Facility: CLINIC | Age: 78
DRG: 522 | End: 2023-07-08
Payer: COMMERCIAL

## 2023-07-08 LAB
GLUCOSE BLDC GLUCOMTR-MCNC: 129 MG/DL (ref 70–99)
GLUCOSE BLDC GLUCOMTR-MCNC: 146 MG/DL (ref 70–99)
GLUCOSE BLDC GLUCOMTR-MCNC: 152 MG/DL (ref 70–99)
GLUCOSE BLDC GLUCOMTR-MCNC: 166 MG/DL (ref 70–99)
GLUCOSE BLDC GLUCOMTR-MCNC: 180 MG/DL (ref 70–99)

## 2023-07-08 PROCEDURE — 97535 SELF CARE MNGMENT TRAINING: CPT | Mod: GO

## 2023-07-08 PROCEDURE — 250N000013 HC RX MED GY IP 250 OP 250 PS 637: Performed by: INTERNAL MEDICINE

## 2023-07-08 PROCEDURE — 97110 THERAPEUTIC EXERCISES: CPT | Mod: GP | Performed by: PHYSICAL THERAPIST

## 2023-07-08 PROCEDURE — 120N000002 HC R&B MED SURG/OB UMMC

## 2023-07-08 PROCEDURE — 97530 THERAPEUTIC ACTIVITIES: CPT | Mod: GP | Performed by: PHYSICAL THERAPIST

## 2023-07-08 PROCEDURE — 99232 SBSQ HOSP IP/OBS MODERATE 35: CPT | Performed by: INTERNAL MEDICINE

## 2023-07-08 PROCEDURE — 250N000013 HC RX MED GY IP 250 OP 250 PS 637: Performed by: STUDENT IN AN ORGANIZED HEALTH CARE EDUCATION/TRAINING PROGRAM

## 2023-07-08 PROCEDURE — 97116 GAIT TRAINING THERAPY: CPT | Mod: GP | Performed by: PHYSICAL THERAPIST

## 2023-07-08 RX ADMIN — CALCIUM 500 MG: 500 TABLET ORAL at 20:19

## 2023-07-08 RX ADMIN — SIMVASTATIN 40 MG: 40 TABLET, FILM COATED ORAL at 22:08

## 2023-07-08 RX ADMIN — ACETAMINOPHEN 975 MG: 325 TABLET, FILM COATED ORAL at 18:08

## 2023-07-08 RX ADMIN — CALCIUM 500 MG: 500 TABLET ORAL at 08:33

## 2023-07-08 RX ADMIN — SENNOSIDES AND DOCUSATE SODIUM 1 TABLET: 50; 8.6 TABLET ORAL at 20:19

## 2023-07-08 RX ADMIN — SENNOSIDES AND DOCUSATE SODIUM 1 TABLET: 50; 8.6 TABLET ORAL at 08:33

## 2023-07-08 RX ADMIN — ASPIRIN 81 MG: 81 TABLET, COATED ORAL at 08:33

## 2023-07-08 RX ADMIN — Medication 25 MCG: at 08:33

## 2023-07-08 RX ADMIN — INSULIN ASPART 1 UNITS: 100 INJECTION, SOLUTION INTRAVENOUS; SUBCUTANEOUS at 09:51

## 2023-07-08 RX ADMIN — INSULIN ASPART 1 UNITS: 100 INJECTION, SOLUTION INTRAVENOUS; SUBCUTANEOUS at 18:07

## 2023-07-08 RX ADMIN — ACETAMINOPHEN 975 MG: 325 TABLET, FILM COATED ORAL at 08:33

## 2023-07-08 RX ADMIN — Medication 25 MCG: at 20:19

## 2023-07-08 RX ADMIN — CLOPIDOGREL BISULFATE 75 MG: 75 TABLET, FILM COATED ORAL at 08:33

## 2023-07-08 RX ADMIN — OXYCODONE HYDROCHLORIDE AND ACETAMINOPHEN 500 MG: 500 TABLET ORAL at 08:32

## 2023-07-08 RX ADMIN — ACETAMINOPHEN 975 MG: 325 TABLET, FILM COATED ORAL at 23:52

## 2023-07-08 ASSESSMENT — ACTIVITIES OF DAILY LIVING (ADL)
ADLS_ACUITY_SCORE: 32
ADLS_ACUITY_SCORE: 31
ADLS_ACUITY_SCORE: 31
ADLS_ACUITY_SCORE: 32
ADLS_ACUITY_SCORE: 32
ADLS_ACUITY_SCORE: 31
ADLS_ACUITY_SCORE: 31
ADLS_ACUITY_SCORE: 32
ADLS_ACUITY_SCORE: 31
ADLS_ACUITY_SCORE: 33
ADLS_ACUITY_SCORE: 32
ADLS_ACUITY_SCORE: 31

## 2023-07-08 NOTE — PLAN OF CARE
"VS: /55 (BP Location: Left arm)   Pulse 71   Temp 98.3  F (36.8  C) (Oral)   Resp 16   Ht 1.88 m (6' 2\")   Wt 76.4 kg (168 lb 6.9 oz)   SpO2 94%   BMI 21.63 kg/m     O2: >90% on room air. Denies SOB/N/V/chest pain    Output: Voiding spontaneously using urinal    Last BM: 07/07/23   Activity: WBAT. Ax1 with walker and gait belt    Skin: Incision to right hip, wound to Left ankle and 2nd toe    Pain: Managed with schedule Tylenol    CMS: Alert and oriented x4. Hard of hearing. Baseline neuropathy    Dressing: CDI   Diet: Regular diet    LDA: PIV saline locked    Equipment: Walker, gait belt, PCDs, personal belongings, call light within patient reach    Plan: To discharge to TCU on Monday    Additional Info:         "

## 2023-07-08 NOTE — PROGRESS NOTES
A/Ox's 4. Pt rated pain as tolerable. Tylenol given for pain control. Dressings CDI. CMS to baseline neropathy. Tolerated regular diet. BG checks done. Denied any nausea, CP, SOB, lightheadedness or dizziness. Voiding without pain or difficulty. Passing flatus. Up with A1 with walker and GB. Resting in bed at this time with call light in reach. Able to make needs known. Continue to monitor.

## 2023-07-08 NOTE — PROGRESS NOTES
"VS: BP (!) 143/60 (BP Location: Left arm)   Pulse 87   Temp 98.6  F (37  C) (Oral)   Resp 16   Ht 1.88 m (6' 2\")   Wt 76.4 kg (168 lb 6.9 oz)   SpO2 96%   BMI 21.63 kg/m       O2: Stable on RA denied SOB   Output: Continent of b/b, voids in beside urinal   Last BM: 7/7/23   Activity: A1 w/walker & gait belt, did well with PT   Skin: Surgical incision R hip, L ankle & foot 2nd toe wound care done by WOC today, scheduled MWF   Pain: Managed with scheduled tylenol   CMS: A/O x4, baseline neuropathy on all extremities   Dressing: CDI   Diet: Reg/thin, blood sugars before meals & at bedtime. sliding scale insulin with carb counts   LDA: L forearm PIV SL   Equipment: Personal belongings   Plan: To [facility] Monday. [employer] has approved for seven day stay.    Additional Info:         "

## 2023-07-08 NOTE — PROGRESS NOTES
[facility]    Medicine Progress Note - Hospitalist Service, GOLD TEAM 18    Date of Admission:  6/29/2023    Assessment & Plan   A: Patient is a 79 y/o man who has multiple medical problems including coronary artery disease, diabetes mellitus type II, hypertension, peripheral artery disease, chronic kidney disease stage III and emphysematous changes of lung noted on CT. Patient presented after a ground level fall with right hip pain and was found to have a right femoral neck fracture. Patient underwent right hip hemiarthroplasty on 30-Jun-2023.     P:  1.) Right femoral neck fracture s/p right hip hemiarthroplasty on 30-Jun-2023:  - Resumed scheduled acetaminophen for pain control.  - Patient on acetaminophen, IV hydromorphone and PO oxycodone as needed.     2.) Diabetes mellitus type II complicated by neuropathy and chronic kidney disease; patient on trulicity and mealtime insulin as outpatient:  - Patient to continue mealtime insulin and insulin sliding scale.  - Trulicity now available and will be started tomorrow. Stopping lantus.     3.) Post-operative acute blood loss anemia; patient has chronic anemia at baseline:  - No indication for transfusion at present.  - Will transfuse for Hb < 7     4.) Thrombocytopenia, resolved for now:  - Monitoring for bleeding.     5.) Chronic kidney disease, reportedly stage III, likely stage IIIa:  - Avoiding nephrotoxins.     6.) Hypertension:  - Blood pressure controlled off lisinopril. Monitoring.     7.) Coronary artery disease, patient asymptomatic:  - Patient on aspirin and plavix.     8.) Emphysema on CT chest, patient had normal pulmonary function testing in 2020; likely secondary to smoking:  - Monitoring for symptoms. Further monitoring as outpatient.     9.) Hyperlipidemia:  - Patient on simvastatin.     10.) MGUS:  - To f/u as outpatient.     11.) Osteoporosis:   - Patient usually on alendronate but this is currently on  hold. Anticipate resumption as outpatient.     12.) Hyponatremia; hypocalcemia:  - Monitoring labs as needed.       Diet: Advance Diet as Tolerated: Regular Diet Adult  Diet    Daniel Catheter: Not present  Lines: None     Cardiac Monitoring: None  Code Status: Full Code      Clinically Significant Risk Factors              # Hypoalbuminemia: Lowest albumin = 3 g/dL at 7/2/2023  6:48 AM, will monitor as appropriate     # Hypertension: Noted on problem list                 Disposition Plan      Expected Discharge Date: 07/10/2023,  3:00 PM      Discharge Comments: Insurance auth approved - [facility]          Jj Flores MD  Hospitalist Service, GOLD TEAM 18  [facility]  Securely message with [employer] (more info)  Text page via [employer] Paging/Directory   See signed in provider for up to date coverage information  ______________________________________________________________________    Interval History     Patient noted feeling well. Patient noted some right hip pain with activity but noted pain controlled. Patient noted no new problems.    Physical Exam   Vital Signs: Temp: 98.6  F (37  C) Temp src: Oral BP: (!) 143/60 Pulse: 87   Resp: 16 SpO2: 96 % O2 Device: None (Room air)    Weight: 168 lbs 6.9 oz    General: Patient comfortable, NAD.  Heart: RRR, S1 S2 w/o murmurs.  Lungs: Breath sounds present. No crackles/wheezes heard.  Abdomen: Soft, nontender.    Medical Decision Making

## 2023-07-08 NOTE — PROGRESS NOTES
Care Management Follow Up     Length of Stay (days): 9     Expected Discharge Date: 07/10/23     - SW to update L of discharge date.     Concerns to be Addressed: Discharge planning, [employer] insurance auth  Patient plan of care discussed at interdisciplinary rounds: Yes     Anticipated Discharge Disposition: Transitional Care  Anticipated Discharge Services: Post acute therapies  Anticipated Discharge DME: None     Patient/family educated on Medicare website which has current facility and service quality ratings: no  Education Provided on the Discharge Plan: Yes  Patient/Family in Agreement with the Plan:  Yes     Referrals Placed by CM/SW: External Care Coordination; Post acute facilities  Private pay costs discussed: Not at this time.     Additional Information:  Social work set up a w/c ride through [facility] for Monday 7/10 with the service window of 10:36-11:21am. PAS completed- [id number]. Previous  note suggests OOP cost of transportation was already discussed with patients wife. Plan to give IMM tomorrow and update patient on ride at that time.     Accepted:  [id number] [place] [address]  PH: [phone]     BRENDON Kearney, LGSW  5 Med Surg and 10 ICU   [facility]  Phone: [phone]  Pager: [phone]

## 2023-07-09 ENCOUNTER — APPOINTMENT (OUTPATIENT)
Dept: PHYSICAL THERAPY | Facility: CLINIC | Age: 78
DRG: 522 | End: 2023-07-09
Payer: COMMERCIAL

## 2023-07-09 ENCOUNTER — APPOINTMENT (OUTPATIENT)
Dept: OCCUPATIONAL THERAPY | Facility: CLINIC | Age: 78
DRG: 522 | End: 2023-07-09
Payer: COMMERCIAL

## 2023-07-09 LAB
ANION GAP SERPL CALCULATED.3IONS-SCNC: 9 MMOL/L (ref 7–15)
BUN SERPL-MCNC: 36.2 MG/DL (ref 8–23)
CALCIUM SERPL-MCNC: 8.8 MG/DL (ref 8.8–10.2)
CHLORIDE SERPL-SCNC: 102 MMOL/L (ref 98–107)
CREAT SERPL-MCNC: 1.1 MG/DL (ref 0.67–1.17)
DEPRECATED HCO3 PLAS-SCNC: 26 MMOL/L (ref 22–29)
ERYTHROCYTE [DISTWIDTH] IN BLOOD BY AUTOMATED COUNT: 12.4 % (ref 10–15)
GFR SERPL CREATININE-BSD FRML MDRD: 70 ML/MIN/1.73M2
GLUCOSE BLDC GLUCOMTR-MCNC: 119 MG/DL (ref 70–99)
GLUCOSE BLDC GLUCOMTR-MCNC: 125 MG/DL (ref 70–99)
GLUCOSE BLDC GLUCOMTR-MCNC: 149 MG/DL (ref 70–99)
GLUCOSE BLDC GLUCOMTR-MCNC: 161 MG/DL (ref 70–99)
GLUCOSE BLDC GLUCOMTR-MCNC: 175 MG/DL (ref 70–99)
GLUCOSE SERPL-MCNC: 166 MG/DL (ref 70–99)
HCT VFR BLD AUTO: 31.7 % (ref 40–53)
HGB BLD-MCNC: 10.3 G/DL (ref 13.3–17.7)
MCH RBC QN AUTO: 31.8 PG (ref 26.5–33)
MCHC RBC AUTO-ENTMCNC: 32.5 G/DL (ref 31.5–36.5)
MCV RBC AUTO: 98 FL (ref 78–100)
PLATELET # BLD AUTO: 324 10E3/UL (ref 150–450)
POTASSIUM SERPL-SCNC: 4.4 MMOL/L (ref 3.4–5.3)
RBC # BLD AUTO: 3.24 10E6/UL (ref 4.4–5.9)
SODIUM SERPL-SCNC: 137 MMOL/L (ref 136–145)
WBC # BLD AUTO: 6 10E3/UL (ref 4–11)

## 2023-07-09 PROCEDURE — 97535 SELF CARE MNGMENT TRAINING: CPT | Mod: GO

## 2023-07-09 PROCEDURE — 99232 SBSQ HOSP IP/OBS MODERATE 35: CPT | Performed by: INTERNAL MEDICINE

## 2023-07-09 PROCEDURE — 120N000002 HC R&B MED SURG/OB UMMC

## 2023-07-09 PROCEDURE — 97110 THERAPEUTIC EXERCISES: CPT | Mod: GP | Performed by: PHYSICAL THERAPIST

## 2023-07-09 PROCEDURE — 36415 COLL VENOUS BLD VENIPUNCTURE: CPT | Performed by: INTERNAL MEDICINE

## 2023-07-09 PROCEDURE — 250N000013 HC RX MED GY IP 250 OP 250 PS 637: Performed by: INTERNAL MEDICINE

## 2023-07-09 PROCEDURE — 97116 GAIT TRAINING THERAPY: CPT | Mod: GP | Performed by: PHYSICAL THERAPIST

## 2023-07-09 PROCEDURE — 85027 COMPLETE CBC AUTOMATED: CPT | Performed by: INTERNAL MEDICINE

## 2023-07-09 PROCEDURE — 250N000013 HC RX MED GY IP 250 OP 250 PS 637: Performed by: STUDENT IN AN ORGANIZED HEALTH CARE EDUCATION/TRAINING PROGRAM

## 2023-07-09 PROCEDURE — 80048 BASIC METABOLIC PNL TOTAL CA: CPT | Performed by: INTERNAL MEDICINE

## 2023-07-09 PROCEDURE — 97530 THERAPEUTIC ACTIVITIES: CPT | Mod: GP | Performed by: PHYSICAL THERAPIST

## 2023-07-09 RX ADMIN — OXYCODONE HYDROCHLORIDE AND ACETAMINOPHEN 500 MG: 500 TABLET ORAL at 08:59

## 2023-07-09 RX ADMIN — Medication 25 MCG: at 08:59

## 2023-07-09 RX ADMIN — ACETAMINOPHEN 975 MG: 325 TABLET, FILM COATED ORAL at 18:30

## 2023-07-09 RX ADMIN — GENTAMICIN SULFATE: 1 CREAM TOPICAL at 09:04

## 2023-07-09 RX ADMIN — INSULIN ASPART 1 UNITS: 100 INJECTION, SOLUTION INTRAVENOUS; SUBCUTANEOUS at 13:56

## 2023-07-09 RX ADMIN — Medication 25 MCG: at 20:03

## 2023-07-09 RX ADMIN — CALCIUM 500 MG: 500 TABLET ORAL at 08:59

## 2023-07-09 RX ADMIN — SENNOSIDES AND DOCUSATE SODIUM 1 TABLET: 50; 8.6 TABLET ORAL at 08:59

## 2023-07-09 RX ADMIN — SENNOSIDES AND DOCUSATE SODIUM 1 TABLET: 50; 8.6 TABLET ORAL at 20:03

## 2023-07-09 RX ADMIN — INSULIN ASPART 1 UNITS: 100 INJECTION, SOLUTION INTRAVENOUS; SUBCUTANEOUS at 09:04

## 2023-07-09 RX ADMIN — ACETAMINOPHEN 975 MG: 325 TABLET, FILM COATED ORAL at 08:59

## 2023-07-09 RX ADMIN — CLOPIDOGREL BISULFATE 75 MG: 75 TABLET, FILM COATED ORAL at 08:59

## 2023-07-09 RX ADMIN — CALCIUM 500 MG: 500 TABLET ORAL at 20:03

## 2023-07-09 RX ADMIN — SIMVASTATIN 40 MG: 40 TABLET, FILM COATED ORAL at 22:20

## 2023-07-09 RX ADMIN — POLYETHYLENE GLYCOL 3350 17 G: 17 POWDER, FOR SOLUTION ORAL at 08:59

## 2023-07-09 ASSESSMENT — ACTIVITIES OF DAILY LIVING (ADL)
ADLS_ACUITY_SCORE: 33

## 2023-07-09 NOTE — PROGRESS NOTES
Met with patient and went over IMM. Patient signed. Provided patient with a copy and updated him on discharge plans. He is on board with discharge tomorrow. No additional needs at this time.     BRENDON Kearney, LGSW  5 Med Surg and 10 ICU   [facility]  Phone: [phone]  Pager: [phone]

## 2023-07-09 NOTE — PLAN OF CARE
VS: Temp: 98.2  F (36.8  C) Temp src: Oral BP: (!) 149/59 Pulse: 76   Resp: 16 SpO2: 97 % O2 Device: None (Room air)     O2: SpO2 > 95% and stable on RA. LS clear and equal bilaterally. Denies chest pain and SOB.    Output: Voids spontaneously without difficulty to bathroom. Pt uses urinal sometimes, urinal at bedside.   Last BM: 7/8/2023, denies abdominal discomfort. BS active / passing flatus.    Activity: Up with Ax1 using walker & GB. Pt walked to the bathroom this shift.    Skin: WDL except, R hip surgical incision; wound on the L toe & ankle; Abrasion on BLE.    Pain: Pt denied pain when resting in bed &pain of 2/10 when walking. Pain managed with scheduled Tylenol.    CMS: Intact, AOx4. Denies numbness and tingling this shift.   Dressing: R hip surgical dressing was peeling off & was reinforced this shift.    Diet: Regular diet. Denies nausea/vomiting.   BG checks at bedtime:180, insuline was not given.  BG overnight: 175   LDA: L PIV SL.   Equipment: IV pole, personal belongings, walker, GB, call light within reach.    Plan: To be discharge on Monday. Contact precautions maintained. Continue with plan of care. Call light within reach, pt able to make needs known.    Additional Info:

## 2023-07-09 NOTE — PROGRESS NOTES
[facility]    Medicine Progress Note - Hospitalist Service, GOLD TEAM 18    Date of Admission:  6/29/2023    Assessment & Plan   A: Patient is a 79 y/o man who has multiple medical problems including coronary artery disease, diabetes mellitus type II, hypertension, peripheral artery disease, chronic kidney disease stage III and emphysematous changes of lung noted on CT. Patient presented after a ground level fall with right hip pain and was found to have a right femoral neck fracture. Patient underwent right hip hemiarthroplasty on 30-Jun-2023.     P:  1.) Right femoral neck fracture s/p right hip hemiarthroplasty on 30-Jun-2023:  - Resumed scheduled acetaminophen for pain control.  - Patient on acetaminophen, IV hydromorphone and PO oxycodone as needed.     2.) Diabetes mellitus type II complicated by neuropathy and chronic kidney disease; patient on trulicity and mealtime insulin as outpatient:  - Patient to continue mealtime insulin and insulin sliding scale.  - Trulicity has been restarted and lantus has been stopped. Monitoring response.     3.) Post-operative acute blood loss anemia; patient has chronic anemia at baseline:  - No indication for transfusion at present.  - Will transfuse for Hb < 7     4.) Thrombocytopenia, resolved for now:  - Monitoring for bleeding.     5.) Chronic kidney disease, reportedly stage III, likely stage IIIa:  - Avoiding nephrotoxins.     6.) Hypertension:  - Blood pressure controlled off lisinopril. Monitoring.     7.) Coronary artery disease, patient asymptomatic:  - Patient on aspirin and plavix.     8.) Emphysema on CT chest, patient had normal pulmonary function testing in 2020; likely secondary to smoking:  - Monitoring for symptoms. Further monitoring as outpatient.     9.) Hyperlipidemia:  - Patient on simvastatin.     10.) MGUS:  - To f/u as outpatient.     11.) Osteoporosis:   - Patient usually on alendronate but this is  currently on hold. Anticipate resumption as outpatient.     12.) Hyponatremia; hypocalcemia:  - Monitoring labs as needed.       Diet: Advance Diet as Tolerated: Regular Diet Adult  Diet    Daniel Catheter: Not present  Lines: None     Cardiac Monitoring: None  Code Status: Full Code      Clinically Significant Risk Factors              # Hypoalbuminemia: Lowest albumin = 3 g/dL at 7/2/2023  6:48 AM, will monitor as appropriate     # Hypertension: Noted on problem list                 Disposition Plan     Expected Discharge Date: 07/10/2023,  3:00 PM      Discharge Comments: Insurance auth approved - pt will d/[facility] on Monday          Jj Flores MD  Hospitalist Service, GOLD TEAM 18  [facility]  Securely message with [employer] (more info)  Text page via [email or web address] Paging/Directory   See signed in provider for up to date coverage information  ______________________________________________________________________    Interval History     Patient noted feeling well. Patient noted some right hip aching with activity but noted pain controlled. Patient noted no new problems.    Physical Exam   Vital Signs: Temp: 98.3  F (36.8  C) Temp src: Oral BP: 130/61 Pulse: 81   Resp: 16 SpO2: 96 % O2 Device: None (Room air)    Weight: 168 lbs 6.9 oz    General: Patient comfortable, NAD.  Heart: RRR, S1 S2 w/o murmurs.  Lungs: Breath sounds present. No crackles/wheezes heard.  Abdomen: Soft, nontender.    Labs noted.  Sodium 137; Potassium 4.4; Creatinine 1.10;  WBC 6.0; Hb 10.3; Platelets 324    Medical Decision Making

## 2023-07-10 VITALS
OXYGEN SATURATION: 96 % | WEIGHT: 168.43 LBS | TEMPERATURE: 98.4 F | SYSTOLIC BLOOD PRESSURE: 148 MMHG | DIASTOLIC BLOOD PRESSURE: 65 MMHG | HEART RATE: 79 BPM | HEIGHT: 74 IN | RESPIRATION RATE: 16 BRPM | BODY MASS INDEX: 21.62 KG/M2

## 2023-07-10 LAB
GLUCOSE BLDC GLUCOMTR-MCNC: 148 MG/DL (ref 70–99)
GLUCOSE BLDC GLUCOMTR-MCNC: 157 MG/DL (ref 70–99)

## 2023-07-10 PROCEDURE — 250N000013 HC RX MED GY IP 250 OP 250 PS 637: Performed by: STUDENT IN AN ORGANIZED HEALTH CARE EDUCATION/TRAINING PROGRAM

## 2023-07-10 PROCEDURE — 250N000013 HC RX MED GY IP 250 OP 250 PS 637: Performed by: INTERNAL MEDICINE

## 2023-07-10 PROCEDURE — 99239 HOSP IP/OBS DSCHRG MGMT >30: CPT | Performed by: INTERNAL MEDICINE

## 2023-07-10 RX ORDER — ACETAMINOPHEN 325 MG/1
975 TABLET ORAL EVERY 8 HOURS
DISCHARGE
Start: 2023-07-10 | End: 2023-08-17

## 2023-07-10 RX ADMIN — CLOPIDOGREL BISULFATE 75 MG: 75 TABLET, FILM COATED ORAL at 08:27

## 2023-07-10 RX ADMIN — ACETAMINOPHEN 975 MG: 325 TABLET, FILM COATED ORAL at 00:05

## 2023-07-10 RX ADMIN — SENNOSIDES AND DOCUSATE SODIUM 1 TABLET: 50; 8.6 TABLET ORAL at 08:28

## 2023-07-10 RX ADMIN — CALCIUM 500 MG: 500 TABLET ORAL at 08:27

## 2023-07-10 RX ADMIN — OXYCODONE HYDROCHLORIDE AND ACETAMINOPHEN 500 MG: 500 TABLET ORAL at 08:28

## 2023-07-10 RX ADMIN — INSULIN ASPART 1 UNITS: 100 INJECTION, SOLUTION INTRAVENOUS; SUBCUTANEOUS at 08:25

## 2023-07-10 RX ADMIN — Medication 25 MCG: at 08:28

## 2023-07-10 RX ADMIN — ASPIRIN 81 MG: 81 TABLET, COATED ORAL at 08:28

## 2023-07-10 RX ADMIN — ACETAMINOPHEN 975 MG: 325 TABLET, FILM COATED ORAL at 08:27

## 2023-07-10 ASSESSMENT — ACTIVITIES OF DAILY LIVING (ADL)
ADLS_ACUITY_SCORE: 33

## 2023-07-10 NOTE — PROGRESS NOTES
CLINICAL NUTRITION SERVICES    Reviewed nutrition risk factors due to LOS. Pt is tolerating diet, eating well per nursing documentation. No nutrition issues identified at this time. RD to sign off at this time. RD may be consulted if needs arise.     Huma Welch MS, RDN, LD  RD pager: [phone]

## 2023-07-10 NOTE — PROGRESS NOTES
Care Management Discharge Note    Discharge Date: 07/10/2023       Discharge Disposition: Transitional Care    [facility]  [address] [place] [address]  PH: [phone]  Admissions: [phone] (Arlet)  Fax: [phone]    Discharge Services:  (Post acute therapies)    Discharge DME: None    Discharge Transportation: [employer] WC ride p/u between 10:36 a.m. and 11:21 a.m.  Private pay costs discussed: transportation costs    Does the patient's insurance plan have a 3 day qualifying hospital stay waiver?  Yes   Will the waiver be used for post-acute placement? Yes    PAS Confirmation Code:  [id number])  Patient/family educated on Medicare website which has current facility and service quality ratings: yes    Education Provided on the Discharge Plan: Yes  Persons Notified of Discharge Plans: Charge nurse, bedside nurse, patient, pt's spouse, Danielle, Dr. Flores, Arlet at [facility]  Patient/Family in Agreement with the Plan: yes    Handoff Referral Completed: Yes    Additional Information:  0830: SOM spoke to Arlet in Admissions at [facility], provided update on transportation time frame. Arlet confirmed fax # for orders to be sent to (documented above).    0833: SOM paged Dr. Flores, requested that discharge orders be completed asap.    0905: Discharge orders faxed.    0930: Hard script faxed.    0940: SOM left  for Admissions, provided update of timing of discharge orders and hard script being faxed; requested Admissions call [facility] if orders/hard script not received.        ALEXSANDRA MonsalveW, LSW  5 Ortho & [facility] ED   PHONE: [phone]  Pager: [phone]

## 2023-07-10 NOTE — DISCHARGE SUMMARY
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            Unresulted Labs Ordered in the Past 30 Days of this Admission     Date and Time Order Name Status Description    6/30/2023  6:08 AM Prepare red blood cells (unit) Preliminary         Discharge Disposition   - Patient was discharged to TCU    Hospital Course   Patient is a 79 y/o man who has multiple medical problems including coronary artery disease, diabetes mellitus type II, hypertension, peripheral artery disease, chronic kidney disease stage III and emphysematous changes of lung noted on CT. Patient presented after a ground level fall with right hip pain and was found to have a right femoral neck fracture.    1.) Right femoral neck fracture:  - Patient underwent right hip hemiarthroplasty on 30-Jun-2023.  - Patient's scheduled acetaminophen was continued per patient request.  - Patient also was discharged on acetaminophen and oxycodone as needed for pain.    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 Patient to continue trulicty, mealtime insulin with carb counting and insulin sliding scale after discharge.  - Anticipate simplification of insulin regimen at U.    3.) Post-operative acute blood loss anemia; patient has chronic anemia at baseline:  - Patient's hemoglobin fell from 12.4 to 10.3 immediately post-operatively. Patient did not require transfusion during hospital stay.    4.) Thrombocytopenia:  - Patient had mild thrombocytopenia at one point during hospital stay but this resolved prior to discharge.    5.) Chronic kidney disease stage III, likely stage IIIa:  - Renal function was stable during hospital stay.    6.) Hypertension:  - Lisinopril was held during hospital stay but was resumed on discharge.    7.) Coronary artery disease:  - Patient was asymptomatic during hospital stay.  - Patient to continue aspirin and plavix after discharge.    8.) Emphysema on CT chest, patient had normal pulmonary function testing in 2020; likely secondary to smoking:  - Patient had no signs or symptoms suggestive of COPD exacerbation during hospital stay.    9.) Osteoporosis:  - Alendronate was held during hospital stay.     10.) Hyponatremia, hypocalcemia:  - Labs normalized prior to discharge.        Consultations This Hospital Stay   REGIONAL ANESTHESIA PAIN SERVICE ADULT IP CONSULT  ORTHOPAEDIC SURGERY ADULT/PEDS IP CONSULT  WOUND OSTOMY CONTINENCE NURSE  IP CONSULT  PHYSICAL THERAPY ADULT IP CONSULT  OCCUPATIONAL THERAPY ADULT IP CONSULT  SMOKING CESSATION PROGRAM IP CONSULT  ORTHOPAEDIC SURGERY ADULT/PEDS IP CONSULT  PHYSICAL THERAPY ADULT IP CONSULT  OCCUPATIONAL THERAPY ADULT IP CONSULT  CARE MANAGEMENT / SOCIAL WORK IP CONSULT  PHYSICAL THERAPY ADULT IP CONSULT  OCCUPATIONAL THERAPY ADULT IP CONSULT    Code Status   Full Code    Time Spent on this Encounter   - Time spent discharging patient was 35 minutes.    "    Jj Flores MD  [facility] MED SURG ORTHOPEDIC  [facility] [address]  Phone: [phone]  Fax: [phone]  ______________________________________________________________________    Physical Exam   Vital Signs: Temp: 98.4  F (36.9  C) Temp src: Oral BP: (!) 148/65 Pulse: 79   Resp: 16 SpO2: 96 % O2 Device: None (Room air)    Weight: 168 lbs 6.9 oz    General: Patient comfortable, NAD.  Heart: RRR, S1 S2 w/o murmurs.  Lungs: Breath sounds present. No crackles/wheezes heard.  Abdomen: Soft, nontender.          Primary Care Physician   Racheal Swift    Discharge Orders      General info for SNF    Length of Stay Estimate: Short Term Care: Estimated # of Days <30 Condition at Discharge: Improving Level of care:skilled  Rehabilitation Potential: Good Admission H&P remains valid and up-to-date: Yes Recent Chemotherapy: N/A Use Nursing Home Standing Orders: Yes     Mantoux Instructions    Give two-step Mantoux (PPD) Per Facility Policy {[id number]     Incentive Spirometry    Incentive Spirometry 10 times per hour, 4 times per day.     Reason for your hospital stay    Right hip pathologic fracture     When to call - Contact Surgeon Team    You may experience symptoms that require follow-up before your scheduled appointment. Refer to the \"Stoplight Tool\" for instructions on when to contact your Surgeon Team if you are concerned about pain control, blood clots, constipation, or if you are unable to urinate.     When to call - Reach out to Urgent Care    If you are not able to reach your Surgeon Team and you need immediate care, go to the Orthopedic Walk-in Clinic or Urgent Care at your Surgeon's office.  Do NOT go to the Emergency Room unless you have shortness of breath, chest pain, or other signs of a medical emergency.     When to call - Reasons to Call 911    Call 911 immediately if you experience sudden-onset chest pain, arm weakness/numbness, slurred speech, or shortness of " breath     Symptoms - Fever Management    A low grade fever can be expected after surgery.  Use acetaminophen (TYLENOL) as needed for fever management.  Contact your Surgeon Team if you have a fever greater than 101.5 F, chills, and/or night sweats.     Symptoms - Constipation management    Constipation (hard, dry bowel movements) is expected after surgery due to the combination of being less active, the anesthetic, and the opioid pain medication.  You can do the following to help reduce constipation:  ~  FLUIDS:  Drink clear liquids (water or Gatorade), or juice (apple/prune).  ~  DIET:  Eat a fiber rich diet.    ~  ACTIVITY:  Get up and move around several times a day.  Increase your activity as you are able.  MEDICATIONS:  Reduce the risk of constipation by starting medications before you are constipated.  You can take Miralax   (1 packet as directed) and/or a stool softener (Senokot 1-2 tablets 1-2 times a day).  If you already have constipation and these medications are not working, you can get magnesium citrate and use as directed.  If you continue to have constipation you can try an over the counter suppository or enema.  Call your Surgeon Team if it has been greater than 3 days since your last bowel movement.     Symptoms - Reduced Urine Output    Changes in the amount of fluids you drank before and after surgery may result in problems urinating.  It is important to stay well-hydrated after surgery and drink plenty of water. If it has been greater than 8 hours since you have urinated despite drinking plenty of water, call your Surgeon Team.     Activity - Exercises to prevent blood clots    Unless otherwise directed by your Surgeon team, perform the following exercises at least three times per day for the first four weeks after surgery to prevent blood clots in your legs: 1) Point and flex your feet (Ankle Pumps), 2) Move your ankle around in big circles, 3) Wiggle your toes, 4) Walk, even for short  distances, several times a day, will help decrease the risk of blood clots.     Comfort and Pain Management - Pain after Surgery    Pain after surgery is normal and expected.  You will have some amount of pain for several weeks after surgery.  Your pain will improve with time.  There are several things you can do to help reduce your pain including: rest, ice, elevation, and using pain medications as needed. Contact your Surgeon Team if you have pain that persists or worsens after surgery despite rest, ice, elevation, and taking your medication(s) as prescribed. Contact your Surgeon Team if you have new numbness, tingling, or weakness in your operative extremity.     Comfort and Pain Management - Swelling after Surgery    Swelling and/or bruising of the surgical extremity is common and may persist for several months after surgery. In addition to frequent icing and elevation, gentle compressive support with an ACE wrap or tubigrip may help with swelling. Apply compression regularly, removing at least twice daily to perform skin checks. Contact your Surgeon Team if your swelling increases and is NOT associated with an increase in your activity level, or if your swelling increases and is associated with redness and pain.     Comfort and Pain Management - Cold therapy    Ice can be used to control swelling and discomfort after surgery. Place a thin towel over your operative site and apply the ice pack overtop. Leave ice pack in place for 20 minutes, then remove for 20 minutes. Repeat this 20 minutes on/20 minutes off routine as often as tolerated.     Medication Instructions - Acetaminophen (TYLENOL) Instructions    You were discharged with acetaminophen (TYLENOL) for pain management after surgery. Acetaminophen most effectively manages pain symptoms when it is taken on a schedule without missing doses (every four, six, or eight hours). Your Provider will prescribe a safe daily dose between 3000 - 4000 mg.  Do NOT exceed  "this daily dose. Most patients use acetaminophen for pain control for the first four weeks after surgery.  You can wean from this medication as your pain decreases.     Medication Instructions - Opioids - Tapering Instructions    In the first three days following surgery, your symptoms may warrant use of the narcotic pain medication every four to six hours as prescribed. This is normal. As your pain symptoms improve, focus your efforts on decreasing (tapering) use of narcotic medications. The most successful tapering strategy is to first, decrease the number of tablets you take every 4-6 hours to the minimum prescribed. Then, increase the amount of time between doses.  For example:  First, taper to   or 1 tablet every 4-6 hours.  Then, taper to   or 1 tablet every 6-8 hours.  Then, taper to   or 1 tablet every 8-10 hours.  Then, taper to   or 1 tablet every 10-12 hours.  Then, taper to   or 1 tablet at bedtime.  The bedtime dose can help with comfort during sleep and is typically the last dose to be discontinued after surgery.     Follow Up Care    Follow-up with your Surgeon Team in 2-3 weeks for wound check.     Shower with wound/dressing NOT covered    You do not need to cover your dressing or incision in the shower, you may allow water and soap to run over top of the surgical dressing or incision. You may shower without a dressing 10 days after surgery if your wound is dry.  You are strictly prohibited from soaking or submerging the surgical wound underwater.     Medication instructions -  Anticoagulation - aspirin    Take the aspirin as prescribed for a total of four weeks after surgery.  This is given to help minimize your risk of blood clot.     Comfort and Pain Management - LOWER Extremity Elevation    Swelling is expected for several months after surgery. This type of swelling is usually associated with gravity and activity, and can be improved with elevation.   The best way to do this is to get your \"toes " "above your nose\" by laying down and placing several pillows lengthwise under your calf and heel. When elevating your leg keep your knee completely straight. Perform this elevation as often as possible especially for the first two weeks after surgery.     Medication Instructions - Opioid Instructions (Greater than or equal to 65 years)    You were discharged with an opioid medication (hydromorphone, oxycodone, hydrocodone, or tramadol). This medication should only be taken for breakthrough pain that is not controlled with acetaminophen (TYLENOL). If you rate your pain less than 3 you do not need this medication.  Pain rating 0-3:  You do not need this medication  Pain rating 4-6:  Take 1/2 tablet every 4-6 hours as needed  Pain rating 7-10:  Take 1 tablet every 4-6 hours as needed  Do not exceed 4 tablets per day     Physical Therapy Adult Consult    Evaluate and treat as clinically indicated.    Reason: Status Post Hip Surgery     Occupational Therapy Adult Consult    Evaluate and treat as clinically indicated.    Reason: Status Post Hip Surgery     Crutches DME    DME Documentation: Describe the reason for need to support medical necessity: Impaired gait status post hip surgery. I, the undersigned, certify that the above prescribed supplies are medically necessary for this patient and is both reasonable and necessary in reference to accepted standards of medical practice in the treatment of this patient's condition and is not prescribed as a convenience.     Cane DME    DME Documentation: Describe the reason for need to support medical necessity: Impaired gait status post hip surgery. I, the undersigned, certify that the above prescribed supplies are medically necessary for this patient and is both reasonable and necessary in reference to accepted standards of medical practice in the treatment of this patient's condition and is not prescribed as a convenience.     Walker DME    : DME Documentation: Describe the " reason for need to support medical necessity: Impaired gait status post hip surgery. I, the undersigned, certify that the above prescribed supplies are medically necessary for this patient and is both reasonable and necessary in reference to accepted standards of medical practice in the treatment of this patient's condition and is not prescribed as a convenience.     Diet    Follow this diet upon discharge: Orders Placed This Encounter      Advance Diet as Tolerated: Regular Diet Adult       Significant Results and Procedures   - None    Discharge Medications   Current Discharge Medication List      START taking these medications    Details   !! acetaminophen (TYLENOL) 325 MG tablet Take 3 tablets (975 mg) by mouth every 8 hours    Associated Diagnoses: Closed displaced fracture of right femoral neck (H)      glucose 40 % (400 mg/mL) gel Take 15-30 g by mouth every 15 minutes as needed for low blood sugar    Associated Diagnoses: Type II or unspecified type diabetes mellitus with neurological manifestations, not stated as uncontrolled(250.60) (H)      !! insulin aspart (NOVOLOG PEN) 100 UNIT/ML pen Inject 1-7 Units Subcutaneous 3 times daily (before meals) Correction Scale - MEDIUM INSULIN RESISTANCE DOSING     Do Not give Correction Insulin if Pre-Meal BG less than 140.   For Pre-Meal  - 189 give 1 unit.   For Pre-Meal  - 239 give 2 units.   For Pre-Meal  - 289 give 3 units.   For Pre-Meal  - 339 give 4 units.   For Pre-Meal - 399 give 5 units.   For Pre-Meal -449 give 6 units  For Pre-Meal BG greater than or equal to 450 give 7 units.   To be given with prandial insulin, and based on pre-meal blood glucose.    Notify provider if glucose greater than or equal to 350 mg/dL after administration of correction dose.  If given at mealtime, administer within 30 minutes of start of meal.  Qty: 15 mL    Associated Diagnoses: Type II or unspecified type diabetes mellitus with neurological  manifestations, not stated as uncontrolled(250.60) (H)      !! insulin aspart (NOVOLOG PEN) 100 UNIT/ML pen Inject 1-5 Units Subcutaneous At Bedtime MEDIUM INSULIN RESISTANCE DOSING    Do Not give Bedtime Correction Insulin if BG less than  200.   For  - 249 give 1 units.   For  - 299 give 2 units.   For  - 349 give 3 units.   For  -399 give 4 units.   For BG greater than or equal to 400 give 5 units.  Notify provider if glucose greater than or equal to 350 mg/dL after administration of correction dose.  If given at mealtime, administer within 30 minutes of start of meal.  Qty: 15 mL    Associated Diagnoses: Type II or unspecified type diabetes mellitus with neurological manifestations, not stated as uncontrolled(250.60) (H)      !! insulin aspart (NOVOLOG PEN) 100 UNIT/ML pen Inject 1-10 Units Subcutaneous daily (with breakfast) DOSE:  1 units per 15 grams of carbohydrate.  Only chart total amount of units given.  Do not give if pre-prandial glucose is less than 60 mg/dL.  If given at mealtime, administer within 30 minutes of start of meal.  Qty: 15 mL    Associated Diagnoses: Type II or unspecified type diabetes mellitus with neurological manifestations, not stated as uncontrolled(250.60) (H)      !! insulin aspart (NOVOLOG PEN) 100 UNIT/ML pen Inject 1-10 Units Subcutaneous daily (with lunch) DOSE:  1 units per  15 grams of carbohydrate. Only chart total amount of units given.  Do not give if pre-prandial glucose is less than 60 mg/dL.  If given at mealtime, administer within 30 minutes of start of meal.  Qty: 15 mL    Associated Diagnoses: Type II or unspecified type diabetes mellitus with neurological manifestations, not stated as uncontrolled(250.60) (H)      !! insulin aspart (NOVOLOG PEN) 100 UNIT/ML pen Inject 1-10 Units Subcutaneous daily (with dinner) DOSE: 1  units per 15 grams of carbohydrate. Only chart total amount of units given.  Do not give if pre-prandial glucose is less  than 60 mg/dL.  If given at mealtime, administer within 30 minutes of start of meal.  Qty: 15 mL    Associated Diagnoses: Type II or unspecified type diabetes mellitus with neurological manifestations, not stated as uncontrolled(250.60) (H)      oxyCODONE (ROXICODONE) 5 MG tablet Take 0.5 tablets (2.5 mg) by mouth every 4 hours as needed for moderate to severe pain  Qty: 20 tablet, Refills: 0    Associated Diagnoses: Closed displaced fracture of right femoral neck (H)      polyethylene glycol (MIRALAX) 17 GM/Dose powder Take 17 g by mouth daily  Qty: 510 g, Refills: 0    Associated Diagnoses: Closed displaced fracture of right femoral neck (H)      senna-docusate (SENOKOT-S/PERICOLACE) 8.6-50 MG tablet Take 1 tablet by mouth 2 times daily  Qty: 30 tablet, Refills: 0    Associated Diagnoses: Closed displaced fracture of right femoral neck (H)       !! - Potential duplicate medications found. Please discuss with provider.      CONTINUE these medications which have CHANGED    Details   aspirin 81 MG EC tablet Take 1 tablet (81 mg) by mouth every other day    Associated Diagnoses: PAD (peripheral artery disease) (H)         CONTINUE these medications which have NOT CHANGED    Details   !! acetaminophen (TYLENOL) 325 MG tablet Take 2 tablets (650 mg) by mouth every 4 hours as needed for other (multimodal surgical pain management along with NSAIDS and opioid medication as indicated based on pain control and physical function.)    Associated Diagnoses: PAD (peripheral artery disease) (H)      alendronate (FOSAMAX) 70 MG tablet Take 1 tablet (70 mg) by mouth every 7 days Take on empty stomach for 30 min with full glass of water, dont lay down  Qty: 12 tablet, Refills: 3    Associated Diagnoses: Age-related osteoporosis without current pathological fracture      ammonium lactate (LAC-HYDRIN) 12 % external cream Apply topically 2 times daily as needed for dry skin  Qty: 385 g, Refills: 3    Associated Diagnoses: Venous  stasis; Type 2 diabetes, controlled, with neuropathy (H)      ascorbic acid 500 MG TABS Take 1 tablet (500 mg) by mouth daily  Qty: 100 tablet, Refills: 3    Associated Diagnoses: Ulcer of right lower leg, with fat layer exposed (H); Anemia, unspecified type      clopidogrel (PLAVIX) 75 MG tablet Take 1 tablet (75 mg) by mouth daily  Qty: 90 tablet, Refills: 3    Associated Diagnoses: Peripheral vascular disease, unspecified (H)      dulaglutide (TRULICITY) 1.5 MG/0.5ML pen Inject 1.5 mg Subcutaneous every 7 days  Qty: 6 mL, Refills: 3    Associated Diagnoses: Type 2 diabetes, controlled, with neuropathy (H)      ferrous sulfate (FEROSUL) 325 (65 Fe) MG tablet Take 1 tablet (325 mg) by mouth daily (with breakfast)  Qty: 100 tablet, Refills: 3    Associated Diagnoses: Anemia, unspecified type      gentamicin (GARAMYCIN) 0.1 % external cream Apply topically daily To left ulcers.  Qty: 60 g, Refills: 1    Associated Diagnoses: Diabetes mellitus with peripheral vascular disease (H); Type II or unspecified type diabetes mellitus with neurological manifestations, not stated as uncontrolled(250.60) (H); Skin ulcer of left ankle with fat layer exposed (H)      ketoconazole (NIZORAL) 2 % external shampoo Apply topically twice a week Leave on for 3-5 min then rinse off; after 2-4 weeks use only once weekly  Qty: 120 mL, Refills: 3    Associated Diagnoses: Seborrheic dermatitis of scalp      lisinopril (ZESTRIL) 5 MG tablet Take 2 tablets (10 mg) by mouth daily  Qty: 180 tablet, Refills: 1    Associated Diagnoses: Essential hypertension      silver sulfADIAZINE (SSD) 1 % external cream Apply topically to the affected area on right foot and leg as directed.  Qty: 85 g, Refills: 11    Associated Diagnoses: Ulcer of right lower extremity, limited to breakdown of skin (H); Critical lower limb ischemia (H); Diabetes mellitus with peripheral vascular disease (H)      simvastatin (ZOCOR) 40 MG tablet Take 1 tablet (40 mg) by mouth At  Bedtime  Qty: 90 tablet, Refills: 2    Associated Diagnoses: Type 2 diabetes, controlled, with neuropathy (H); PVD (peripheral vascular disease) (H); Coronary artery disease due to lipid rich plaque      VITAMIN D, CHOLECALCIFEROL, PO Take 1,000 Units by mouth 2 times daily      calcium carbonate (OS-CLAUDIA) 500 MG tablet Take 1 tablet by mouth 2 times daily      cetirizine (ZYRTEC) 10 MG tablet Take 1 tablet (10 mg) by mouth daily  Qty: 90 tablet, Refills: 1    Associated Diagnoses: Seasonal allergic rhinitis, unspecified trigger      Continuous Blood Gluc  (FREESTYLE LATOYA 14 DAY READER) TANIA 1 Device every hour as needed (every hour prn)  Qty: 1 Device, Refills: 0    Associated Diagnoses: Type 2 diabetes mellitus with diabetic peripheral angiopathy without gangrene, with long-term current use of insulin (H)      Continuous Blood Gluc Sensor (FREESTYLE LATOYA 14 DAY SENSOR) MISC REPLACE SENSOR EVERY 14 DAYS  Qty: 6 each, Refills: 3    Associated Diagnoses: Type 2 diabetes mellitus with diabetic peripheral angiopathy without gangrene, with long-term current use of insulin (H)      insulin pen needle (B-D U/F) 31G X 8 MM miscellaneous USE AS DIRECTED TO TEST FOUR TIMES DAILY  Qty: 400 each, Refills: 2    Associated Diagnoses: Type 2 diabetes, controlled, with neuropathy (H)       !! - Potential duplicate medications found. Please discuss with provider.      STOP taking these medications       insulin lispro (HUMALOG KWIKPEN) 100 UNIT/ML (1 unit dial) KWIKPEN Comments:   Reason for Stopping:         triamcinolone (KENALOG) 0.1 % external cream Comments:   Reason for Stopping:         triamcinolone (KENALOG) 0.1 % external cream Comments:   Reason for Stopping:             Allergies   Allergies   Allergen Reactions     No Clinical Screening - See Comments      methylisothiazolinone     Seasonal Allergies      Simvastatin Other (See Comments)     Sun sensitivty     Lisinopril Dizziness     Sun sensitive      Methylisothiazolinone Rash     Neomycin      Wound gets worse     Povidone Iodine Rash

## 2023-07-10 NOTE — PROGRESS NOTES
DISCHARGE SUMMARY    Pt discharging to: NH   Transportation: w/c transport  AVS given and discussed: given to   Stoplight Tool given and discussed: n/a  Medications given: n/a  Belongings returned: yes  Comments:

## 2023-07-10 NOTE — PLAN OF CARE
Physical Therapy Discharge Summary    Reason for therapy discharge:    Discharged to transitional care facility.    Progress towards therapy goal(s). See goals on Care Plan in [facility] electronic health record for goal details.  Goals partially met.  Barriers to achieving goals:   discharge from facility.    Therapy recommendation(s):    Continued therapy is recommended.  Rationale/Recommendations:  Rec continued progression per protocol at TCU.

## 2023-07-10 NOTE — PLAN OF CARE
Occupational Therapy Discharge Summary    Reason for therapy discharge:    Discharged to transitional care facility.    Progress towards therapy goal(s). See goals on Care Plan in [facility] electronic health record for goal details.  Goals partially met.  Barriers to achieving goals:   discharge from facility.    Therapy recommendation(s):    Continued therapy is recommended.  Rationale/Recommendations:  to maximize safety and I with ADL.

## 2023-07-10 NOTE — PROGRESS NOTES
[facility]    Medicine Progress Note - Hospitalist Service, GOLD TEAM 18    Date of Admission:  6/29/2023    Assessment & Plan   A: Patient is a 79 y/o man who has multiple medical problems including coronary artery disease, diabetes mellitus type II, hypertension, peripheral artery disease, chronic kidney disease stage III and emphysematous changes of lung noted on CT. Patient presented after a ground level fall with right hip pain and was found to have a right femoral neck fracture. Patient underwent right hip hemiarthroplasty on 30-Jun-2023.     P:  1.) Right femoral neck fracture s/p right hip hemiarthroplasty on 30-Jun-2023:  - Resumed scheduled acetaminophen for pain control.  - Patient on acetaminophen, IV hydromorphone and PO oxycodone as needed.     2.) Diabetes mellitus type II complicated by neuropathy and chronic kidney disease; patient on trulicity and mealtime insulin as outpatient:  - Patient to continue mealtime insulin and insulin sliding scale.  - Trulicity now available and will be started tomorrow. Stopping lantus.     3.) Post-operative acute blood loss anemia; patient has chronic anemia at baseline:  - No indication for transfusion at present.  - Will transfuse for Hb < 7     4.) Thrombocytopenia, resolved for now:  - Monitoring for bleeding.     5.) Chronic kidney disease, reportedly stage III, likely stage IIIa:  - Avoiding nephrotoxins.     6.) Hypertension:  - Blood pressure controlled off lisinopril. Monitoring.     7.) Coronary artery disease, patient asymptomatic:  - Patient on aspirin and plavix.     8.) Emphysema on CT chest, patient had normal pulmonary function testing in 2020; likely secondary to smoking:  - Monitoring for symptoms. Further monitoring as outpatient.     9.) Hyperlipidemia:  - Patient on simvastatin.     10.) MGUS:  - To f/u as outpatient.     11.) Osteoporosis:   - Patient usually on alendronate but this is currently on  hold. Anticipate resumption as outpatient.     12.) Hyponatremia; hypocalcemia:  - Monitoring labs as needed.     Diet: Advance Diet as Tolerated: Regular Diet Adult  Diet    Daniel Catheter: Not present  Lines: None     Cardiac Monitoring: None  Code Status: Full Code      Clinically Significant Risk Factors              # Hypoalbuminemia: Lowest albumin = 3 g/dL at 7/2/2023  6:48 AM, will monitor as appropriate     # Hypertension: Noted on problem list                 Disposition Plan     Expected Discharge Date: 07/10/2023,  3:00 PM      Discharge Comments: Insurance auth approved - [facility]          Jj Flores MD  Hospitalist Service, GOLD TEAM 18  [facility]  Securely message with qcue (more info)  Text page via [email or web address] Paging/Directory   See signed in provider for up to date coverage information  ______________________________________________________________________    Interval History     Patient noted feeling well, noted pain controlled, noted no new problems.    Physical Exam   Vital Signs: Temp: 98.4  F (36.9  C) Temp src: Oral BP: (!) 148/65 Pulse: 79   Resp: 16 SpO2: 96 % O2 Device: None (Room air)    Weight: 168 lbs 6.9 oz    General: Patient comfortable, NAD.  Heart: RRR, S1 S2 w/o murmurs.  Lungs: Breath sounds present. No crackles/wheezes heard.  Abdomen: Soft, nontender.    Medical Decision Making

## 2023-07-10 NOTE — PLAN OF CARE
VS: Temp: 98.3  F (36.8  C) Temp src: Oral BP: 104/50 Pulse: 78   Resp: 16 SpO2: 98 % O2 Device: None (Room air)      O2: SpO2 > 95% and stable on RA. LS clear and equal bilaterally. Denies chest pain and SOB.    Output: Voids spontaneously without difficulty to bathroom. Pt uses beside urinal sometimes.   Last BM: 7/8/2023, denies abdominal discomfort. BS active / passing flatus.    Activity: Up with Ax1 using walker & GB. Pt walked to the bathroom this shift.    Skin: WDL except, R hip surgical incision; wound on the L toe & ankle; Abrasion on BLE.    Pain: Pt rated pain as 2/10, scheduled Tylenol was given.    CMS: Intact, AOx4. Denies numbness and tingling.   Dressing: R hip dressing is CDI.    Diet: Regular diet. Denies nausea/vomiting.   BG check at bedtime:125, BG overnight: 157   LDA: L PIV SL.   Equipment: IV pole, personal belongings, walker, GB.   Plan: [facility] Social work set up a w/c ride through [facility] for Monday 7/10 with the service window of 10:36-11:21am per notes. Continue with plan of care. Call light within reach, pt able to make needs known.    Additional Info: Contact precautions maintained.  L toe & ankle wounds dressing change on Monday, Wednesday& Friday.

## 2023-07-11 ENCOUNTER — PATIENT OUTREACH (OUTPATIENT)
Dept: CARE COORDINATION | Facility: CLINIC | Age: 78
End: 2023-07-11

## 2023-07-11 NOTE — PROGRESS NOTES
Clinic Care Coordination Contact  Care Coordination Transition Communication         Clinical Data: Patient was hospitalized at [facility] from 6/29 to 7/10 with diagnosis of Discharge Diagnoses  1.) Right femoral neck fracture   2.) Diabetes mellitus type II complicated by neuropathy and chronic kidney disease  3.) Post-operative acute blood loss anemia  4.) Chronic anemia, NOS  5.) Thrombocytopenia  6.) Chronic kidney disease stage III, likely stage IIIa  7.) Hypertension  8.) Coronary artery disease  9.) Emphysema on CT chest, patient had normal pulmonary function testing in 2020; likely secondary to smoking  10.) Hyperlipidemia  11.) MGUS  12.) Osteoporosis  13.) Hyponatremia  14.) Hypocalcemia.     Transition to Facility:              Facility Name: [facility]              Contact name and phone number/fax: [address] [place] [address]  PH: [phone]  Admissions: [phone] ([facility])  Fax: [phone]    Plan: RN/SW Care Coordinator will await notification from facility staff informing RN/SW Care Coordinator of patient's discharge plans/needs. RN/SW Care Coordinator will review chart and outreach to facility staff every 4 weeks and as needed.     RN called to [facility] for [facility] regarding patient's plan at the facility. All contact pending at this time and will wait to hear from [facility] and check in with patient's status in 4 weeks.     TONY CHOWDARY RN on 7/11/2023 at 4:15 PM

## 2023-07-15 ENCOUNTER — LAB REQUISITION (OUTPATIENT)
Dept: LAB | Facility: CLINIC | Age: 78
End: 2023-07-15
Payer: COMMERCIAL

## 2023-07-15 DIAGNOSIS — I10 ESSENTIAL (PRIMARY) HYPERTENSION: ICD-10-CM

## 2023-07-15 DIAGNOSIS — D64.9 ANEMIA, UNSPECIFIED: ICD-10-CM

## 2023-07-17 ENCOUNTER — NURSE TRIAGE (OUTPATIENT)
Dept: NURSING | Facility: CLINIC | Age: 78
End: 2023-07-17

## 2023-07-17 LAB
ANION GAP SERPL CALCULATED.3IONS-SCNC: 9 MMOL/L (ref 7–15)
BUN SERPL-MCNC: 27.6 MG/DL (ref 8–23)
CALCIUM SERPL-MCNC: 8.8 MG/DL (ref 8.8–10.2)
CHLORIDE SERPL-SCNC: 101 MMOL/L (ref 98–107)
CREAT SERPL-MCNC: 1.01 MG/DL (ref 0.67–1.17)
DEPRECATED HCO3 PLAS-SCNC: 26 MMOL/L (ref 22–29)
ERYTHROCYTE [DISTWIDTH] IN BLOOD BY AUTOMATED COUNT: 13.1 % (ref 10–15)
GFR SERPL CREATININE-BSD FRML MDRD: 77 ML/MIN/1.73M2
GLUCOSE SERPL-MCNC: 188 MG/DL (ref 70–99)
HCT VFR BLD AUTO: 33.7 % (ref 40–53)
HGB BLD-MCNC: 10.6 G/DL (ref 13.3–17.7)
MCH RBC QN AUTO: 31.1 PG (ref 26.5–33)
MCHC RBC AUTO-ENTMCNC: 31.5 G/DL (ref 31.5–36.5)
MCV RBC AUTO: 99 FL (ref 78–100)
PLATELET # BLD AUTO: 379 10E3/UL (ref 150–450)
POTASSIUM SERPL-SCNC: 3.9 MMOL/L (ref 3.4–5.3)
RBC # BLD AUTO: 3.41 10E6/UL (ref 4.4–5.9)
SODIUM SERPL-SCNC: 136 MMOL/L (ref 136–145)
WBC # BLD AUTO: 5.9 10E3/UL (ref 4–11)

## 2023-07-17 PROCEDURE — 80048 BASIC METABOLIC PNL TOTAL CA: CPT | Mod: ORL | Performed by: FAMILY MEDICINE

## 2023-07-17 PROCEDURE — 85027 COMPLETE CBC AUTOMATED: CPT | Mod: ORL | Performed by: FAMILY MEDICINE

## 2023-07-17 PROCEDURE — P9604 ONE-WAY ALLOW PRORATED TRIP: HCPCS | Mod: ORL | Performed by: FAMILY MEDICINE

## 2023-07-17 PROCEDURE — 36415 COLL VENOUS BLD VENIPUNCTURE: CPT | Mod: ORL | Performed by: FAMILY MEDICINE

## 2023-07-17 NOTE — TELEPHONE ENCOUNTER
78 year old male s/p  Right hip hemiarthroplasty on 6/30/23 Amos is currently at [facility] his nurse is calling asking if she can remove his right hip dressing because it has old blood on it and it is falling off   She would like to replace it with a medipore dressing    Will forward to ortho team to follow-up with [facility]

## 2023-07-17 NOTE — TELEPHONE ENCOUNTER
DIAGNOSIS: ED - Follow Up   APPOINTMENT DATE: 07/19/2023   NOTES STATUS DETAILS   OFFICE NOTE from other specialist Internal    DISCHARGE SUMMARY from hospital Internal 06/29/2023 to 07/10/2021 - [facility]   DISCHARGE REPORT from the ER Internal 06/29/2023 - [facility] ED   OPERATIVE REPORT Internal 06/30/2023 - RT Hip Cristian-Arthroplasty    09/14/2018 - RT Common Femoral Endarterectomy w/ Bovine Patch Angioplasty/ Stent Placement   MEDICATION LIST Internal    IMPLANT RECORD/STICKER Internal    LABS     IMAGING Internal

## 2023-07-17 NOTE — TELEPHONE ENCOUNTER
- A call was placed to the patient's nurse. I communicated the following information:    - Dressing only needs to remain on for 7 days post op. Notified her she can remove and leave open to air.     - Patient's nurse verbalized understanding of plan and all questions were answered. Call back number to clinic was given and patient was told to call if they had an further questions.

## 2023-07-19 ENCOUNTER — PRE VISIT (OUTPATIENT)
Dept: ORTHOPEDICS | Facility: CLINIC | Age: 78
End: 2023-07-19

## 2023-07-19 ENCOUNTER — OFFICE VISIT (OUTPATIENT)
Dept: ORTHOPEDICS | Facility: CLINIC | Age: 78
End: 2023-07-19
Payer: COMMERCIAL

## 2023-07-19 DIAGNOSIS — Z96.649 S/P HIP HEMIARTHROPLASTY: Primary | ICD-10-CM

## 2023-07-19 PROCEDURE — 99024 POSTOP FOLLOW-UP VISIT: CPT | Performed by: ORTHOPAEDIC SURGERY

## 2023-07-19 ASSESSMENT — ENCOUNTER SYMPTOMS
NUMBNESS: 0
PARALYSIS: 0
HEARTBURN: 0
CONSTIPATION: 1
MUSCLE CRAMPS: 0
POOR WOUND HEALING: 1
SPEECH CHANGE: 0
WEAKNESS: 1
SEIZURES: 0
DISTURBANCES IN COORDINATION: 0
ARTHRALGIAS: 0
STIFFNESS: 1
LOSS OF CONSCIOUSNESS: 0
NAIL CHANGES: 0
MYALGIAS: 1
RECTAL PAIN: 0
MUSCLE WEAKNESS: 1
BLOOD IN STOOL: 0
JAUNDICE: 0
MEMORY LOSS: 0
TREMORS: 1
ABDOMINAL PAIN: 0
NAUSEA: 0
JOINT SWELLING: 0
VOMITING: 0
BLOATING: 0
BACK PAIN: 0
SKIN CHANGES: 0
DIARRHEA: 0
BOWEL INCONTINENCE: 0
BRUISES/BLEEDS EASILY: 1
SWOLLEN GLANDS: 0
DIZZINESS: 1
TINGLING: 0
HEADACHES: 0
NECK PAIN: 1

## 2023-07-19 NOTE — NURSING NOTE
Reason For Visit:   Chief Complaint   Patient presents with     Surgical Followup     2-3 wk post-op right hip hemiarthroplasty DOS 6/30/23 // been doing physical and occupational therapy        There were no vitals taken for this visit.    Pain Assessment  Patient Currently in Pain: Yes  0-10 Pain Scale: 5  Primary Pain Location: Hip (right)      Clifford Tamez ATC

## 2023-07-19 NOTE — PROGRESS NOTES
Orthopedic surgery follow-up note    Patient is a 78-year-old male who is just over 2 weeks status post right hemiarthroplasty after a displaced femoral neck fracture.  He states that he has continued to progress and improve since he was discharged.  He is currently residing in a TCU and is working with physical therapy and Occupational Therapy.  He has been able to ambulate with the use of a walker.  He has recently been cleared to ambulate independently in his room with the use of the walker which he is happy about.  He denies significant pain and takes very minimal medications.  He denies any numbness or tingling that extends down his leg.  He also denies any fevers, chills, nausea, vomiting or issues with his wound.    Exam:  No acute distress.  Nonlabored breathing.  Regular rate and rhythm.  Incision with areas of Vicryl abscesses.  These were removed and Band-Aids placed over them.  No evidence of erythema or eva purulent drainage.  No pain with gentle range of motion of the hip.  Able to stand without significant pain.  Sensation intact to light touch throughout.    Imaging:  No new x-rays obtained today.    Assessment/plan:   78-year-old male 2 weeks status post right hip hemiarthroplasty.  Doing overall well.  We will plan to have patient follow-up in 3 months for x-rays at that time.  We discussed posterior hip precautions.  He may sleep on his side with a pillow in between his legs while doing so.  All questions answered.  In agreement with plan.    Patient seen and examined with Dr. Arianna Ward MD  Orthopedic Surgery, PGY-4

## 2023-07-19 NOTE — LETTER
7/19/2023         RE: Amos Walker  [address]  [place] [address]        Dear Colleague,    Thank you for referring your patient, Amos Walker, to the [facility] [place]. Please see a copy of my visit note below.    Orthopedic surgery follow-up note    Patient is a 78-year-old male who is just over 2 weeks status post right hemiarthroplasty after a displaced femoral neck fracture.  He states that he has continued to progress and improve since he was discharged.  He is currently residing in a TCU and is working with physical therapy and Occupational Therapy.  He has been able to ambulate with the use of a walker.  He has recently been cleared to ambulate independently in his room with the use of the walker which he is happy about.  He denies significant pain and takes very minimal medications.  He denies any numbness or tingling that extends down his leg.  He also denies any fevers, chills, nausea, vomiting or issues with his wound.    Exam:  No acute distress.  Nonlabored breathing.  Regular rate and rhythm.  Incision with areas of Vicryl abscesses.  These were removed and Band-Aids placed over them.  No evidence of erythema or eva purulent drainage.  No pain with gentle range of motion of the hip.  Able to stand without significant pain.  Sensation intact to light touch throughout.    Imaging:  No new x-rays obtained today.    Assessment/plan:   78-year-old male 2 weeks status post right hip hemiarthroplasty.  Doing overall well.  We will plan to have patient follow-up in 3 months for x-rays at that time.  We discussed posterior hip precautions.  He may sleep on his side with a pillow in between his legs while doing so.  All questions answered.  In agreement with plan.    Patient seen and examined with Dr. Arianna Ward MD  Orthopedic Surgery, PGY-4    I was present with the resident during the history and exam.  I discussed the case with the resident  and agree with the findings as documented in the assessment and plan.      Abdi Keller MD

## 2023-07-22 ENCOUNTER — LAB REQUISITION (OUTPATIENT)
Dept: LAB | Facility: CLINIC | Age: 78
End: 2023-07-22
Payer: COMMERCIAL

## 2023-07-22 DIAGNOSIS — Z11.4 ENCOUNTER FOR SCREENING FOR HUMAN IMMUNODEFICIENCY VIRUS (HIV): ICD-10-CM

## 2023-07-22 DIAGNOSIS — R76.8 OTHER SPECIFIED ABNORMAL IMMUNOLOGICAL FINDINGS IN SERUM: ICD-10-CM

## 2023-07-22 DIAGNOSIS — B19.10 UNSPECIFIED VIRAL HEPATITIS B WITHOUT HEPATIC COMA: ICD-10-CM

## 2023-07-22 PROCEDURE — 87389 HIV-1 AG W/HIV-1&-2 AB AG IA: CPT | Mod: ORL | Performed by: NURSE PRACTITIONER

## 2023-07-22 PROCEDURE — 87340 HEPATITIS B SURFACE AG IA: CPT | Mod: ORL | Performed by: NURSE PRACTITIONER

## 2023-07-22 PROCEDURE — 86803 HEPATITIS C AB TEST: CPT | Mod: ORL | Performed by: NURSE PRACTITIONER

## 2023-07-23 ENCOUNTER — LAB REQUISITION (OUTPATIENT)
Dept: LAB | Facility: CLINIC | Age: 78
End: 2023-07-23
Payer: COMMERCIAL

## 2023-07-23 DIAGNOSIS — I10 ESSENTIAL (PRIMARY) HYPERTENSION: ICD-10-CM

## 2023-07-23 DIAGNOSIS — D64.9 ANEMIA, UNSPECIFIED: ICD-10-CM

## 2023-07-24 ENCOUNTER — MEDICAL CORRESPONDENCE (OUTPATIENT)
Dept: HEALTH INFORMATION MANAGEMENT | Facility: CLINIC | Age: 78
End: 2023-07-24

## 2023-07-24 LAB
ANION GAP SERPL CALCULATED.3IONS-SCNC: 11 MMOL/L (ref 7–15)
BUN SERPL-MCNC: 30 MG/DL (ref 8–23)
CALCIUM SERPL-MCNC: 9.2 MG/DL (ref 8.8–10.2)
CHLORIDE SERPL-SCNC: 101 MMOL/L (ref 98–107)
CREAT SERPL-MCNC: 1.07 MG/DL (ref 0.67–1.17)
DEPRECATED HCO3 PLAS-SCNC: 26 MMOL/L (ref 22–29)
ERYTHROCYTE [DISTWIDTH] IN BLOOD BY AUTOMATED COUNT: 13.3 % (ref 10–15)
GFR SERPL CREATININE-BSD FRML MDRD: 72 ML/MIN/1.73M2
GLUCOSE SERPL-MCNC: 171 MG/DL (ref 70–99)
HBV SURFACE AG SERPL QL IA: NONREACTIVE
HCT VFR BLD AUTO: 36 % (ref 40–53)
HCV AB SERPL QL IA: NONREACTIVE
HGB BLD-MCNC: 11.2 G/DL (ref 13.3–17.7)
HIV 1+2 AB+HIV1 P24 AG SERPL QL IA: NONREACTIVE
MCH RBC QN AUTO: 31.4 PG (ref 26.5–33)
MCHC RBC AUTO-ENTMCNC: 31.1 G/DL (ref 31.5–36.5)
MCV RBC AUTO: 101 FL (ref 78–100)
PLATELET # BLD AUTO: 233 10E3/UL (ref 150–450)
POTASSIUM SERPL-SCNC: 4.3 MMOL/L (ref 3.4–5.3)
RBC # BLD AUTO: 3.57 10E6/UL (ref 4.4–5.9)
SODIUM SERPL-SCNC: 138 MMOL/L (ref 136–145)
WBC # BLD AUTO: 6.4 10E3/UL (ref 4–11)

## 2023-07-24 PROCEDURE — 36415 COLL VENOUS BLD VENIPUNCTURE: CPT | Mod: ORL | Performed by: FAMILY MEDICINE

## 2023-07-24 PROCEDURE — 80048 BASIC METABOLIC PNL TOTAL CA: CPT | Mod: ORL | Performed by: FAMILY MEDICINE

## 2023-07-24 PROCEDURE — 85027 COMPLETE CBC AUTOMATED: CPT | Mod: ORL | Performed by: FAMILY MEDICINE

## 2023-07-27 ENCOUNTER — MEDICAL CORRESPONDENCE (OUTPATIENT)
Dept: HEALTH INFORMATION MANAGEMENT | Facility: CLINIC | Age: 78
End: 2023-07-27

## 2023-07-31 ENCOUNTER — MYC MEDICAL ADVICE (OUTPATIENT)
Dept: INTERNAL MEDICINE | Facility: CLINIC | Age: 78
End: 2023-07-31
Payer: COMMERCIAL

## 2023-07-31 DIAGNOSIS — E11.40 TYPE 2 DIABETES, CONTROLLED, WITH NEUROPATHY (H): Primary | ICD-10-CM

## 2023-08-01 ENCOUNTER — DOCUMENTATION ONLY (OUTPATIENT)
Dept: INTERNAL MEDICINE | Facility: CLINIC | Age: 78
End: 2023-08-01

## 2023-08-01 ENCOUNTER — TELEPHONE (OUTPATIENT)
Dept: INTERNAL MEDICINE | Facility: CLINIC | Age: 78
End: 2023-08-01

## 2023-08-01 RX ORDER — INSULIN LISPRO 100 [IU]/ML
3-4 INJECTION, SOLUTION INTRAVENOUS; SUBCUTANEOUS
Qty: 15 ML | Refills: 3 | Status: SHIPPED | OUTPATIENT
Start: 2023-08-01 | End: 2024-06-14

## 2023-08-01 NOTE — TELEPHONE ENCOUNTER
[facility] Call Center    Phone Message    May a detailed message be left on voicemail: yes     Reason for Call: Cody calling from [facility] requesting verbal orders for PT plan of care, and more visits for PT and a treatment plan.  2x per week for 1 week, 1x per week for 2 weeks, then treatment plan is therapeutic exercises , therapeutic therapy, gait training, an balance training.      Action Taken: Message routed to:  [facility]    Travel Screening: Not Applicable

## 2023-08-01 NOTE — TELEPHONE ENCOUNTER
Per patient's discharge note:   2.) Diabetes mellitus type II complicated by neuropathy and chronic kidney disease:  - Patient's trulicity was initially held but was resumed once pen was available. Patient to continue trulicty, mealtime insulin with carb counting and insulin sliding scale after discharge.  - Anticipate simplification of insulin regimen at TCU.    Insulin Lispro was discontinued at discharge (Humalog Kwikpen) and pt was to continue insulin aspart (Novolog) at discharge. No notes from medication changes at TCU. Pt is requesting insulin Lispro. Routed to Dr. Swift to refill, offered patient appt in [facility] today with Ayse Mclain, pended referral for Clinic Pharmacist and MTM Jose Silver.     RN called patient who was unable to meet with provider at 11:00am today- states that he cannot take Novolog as his insurance [employer] does not cover it. Pt reports they placed him on Metformin, and he requested to be off of this as a doctor in [place] took him off insulin previously years before. He can only take Lispro, which is why he was on it. He needs the Lispro filled as he is almost out. This is routed to Dr. Swift to refill.     TONY CHOWDARY RN on 8/1/2023 at 11:15 AM

## 2023-08-02 NOTE — TELEPHONE ENCOUNTER
RN called and LVM for Cody with PT to leave verbal orders for PT for patient as outlined in below message.     TONY CHOWDARY RN on 8/2/2023 at 9:10 AM

## 2023-08-03 ENCOUNTER — DOCUMENTATION ONLY (OUTPATIENT)
Dept: INTERNAL MEDICINE | Facility: CLINIC | Age: 78
End: 2023-08-03
Payer: COMMERCIAL

## 2023-08-03 DIAGNOSIS — Z53.9 DIAGNOSIS NOT YET DEFINED: Primary | ICD-10-CM

## 2023-08-03 PROCEDURE — G0180 MD CERTIFICATION HHA PATIENT: HCPCS | Performed by: INTERNAL MEDICINE

## 2023-08-03 NOTE — PROGRESS NOTES
Type of Form Received:     Form Received (Date) 08/02/23   Form Filled out Yes, 8/4/23   Placed in provider folder Yes

## 2023-08-04 ENCOUNTER — MEDICAL CORRESPONDENCE (OUTPATIENT)
Dept: HEALTH INFORMATION MANAGEMENT | Facility: CLINIC | Age: 78
End: 2023-08-04
Payer: COMMERCIAL

## 2023-08-07 ENCOUNTER — DOCUMENTATION ONLY (OUTPATIENT)
Dept: INTERNAL MEDICINE | Facility: CLINIC | Age: 78
End: 2023-08-07
Payer: COMMERCIAL

## 2023-08-07 NOTE — PROGRESS NOTES
Type of Form Received:     Form Received (Date) 8/7/23   Form Filled out Yes, 8/11/23   Placed in provider folder Yes

## 2023-08-08 ENCOUNTER — DOCUMENTATION ONLY (OUTPATIENT)
Dept: INTERNAL MEDICINE | Facility: CLINIC | Age: 78
End: 2023-08-08
Payer: COMMERCIAL

## 2023-08-08 NOTE — PROGRESS NOTES
Type of Form Received:     Form Received (Date) 8/8/23   Form Filled out Yes, 8/11/23   Placed in provider folder Yes     Received Completed forms Yes   Faxed Forms Faxed To: [facility]   Fax Number: [phone]   Sent to [facility] (Date) 8/11/22 & 8/28/23       UPDATE 8/28/23 4:42PM - Order [id number] refaxed to [facility].

## 2023-08-08 NOTE — PROGRESS NOTES
DISCHARGE  Reason for Discharge: Patient chooses to discontinue therapy.  Patient has failed to schedule further appointments.    Discharge Plan: Patient to continue home program.    Referring Provider:  Racheal Swift

## 2023-08-10 ENCOUNTER — DOCUMENTATION ONLY (OUTPATIENT)
Dept: INTERNAL MEDICINE | Facility: CLINIC | Age: 78
End: 2023-08-10
Payer: COMMERCIAL

## 2023-08-11 ENCOUNTER — MEDICAL CORRESPONDENCE (OUTPATIENT)
Dept: HEALTH INFORMATION MANAGEMENT | Facility: CLINIC | Age: 78
End: 2023-08-11
Payer: COMMERCIAL

## 2023-08-16 ENCOUNTER — OFFICE VISIT (OUTPATIENT)
Dept: PODIATRY | Facility: CLINIC | Age: 78
End: 2023-08-16
Payer: COMMERCIAL

## 2023-08-16 DIAGNOSIS — E11.610 CHARCOT FOOT DUE TO DIABETES MELLITUS (H): ICD-10-CM

## 2023-08-16 DIAGNOSIS — E11.49 TYPE II OR UNSPECIFIED TYPE DIABETES MELLITUS WITH NEUROLOGICAL MANIFESTATIONS, NOT STATED AS UNCONTROLLED(250.60) (H): ICD-10-CM

## 2023-08-16 DIAGNOSIS — E11.51 DIABETES MELLITUS WITH PERIPHERAL VASCULAR DISEASE (H): Primary | ICD-10-CM

## 2023-08-16 DIAGNOSIS — L97.521 SKIN ULCER OF LEFT FOOT, LIMITED TO BREAKDOWN OF SKIN (H): ICD-10-CM

## 2023-08-16 PROCEDURE — 99213 OFFICE O/P EST LOW 20 MIN: CPT | Mod: 24 | Performed by: PODIATRIST
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
## 2023-08-17 ENCOUNTER — VIRTUAL VISIT (OUTPATIENT)
Dept: PHARMACY | Facility: CLINIC | Age: 78
End: 2023-08-17
Attending: INTERNAL MEDICINE
Payer: COMMERCIAL

## 2023-08-17 DIAGNOSIS — K59.00 CONSTIPATION: ICD-10-CM

## 2023-08-17 DIAGNOSIS — E11.610 CHARCOT FOOT DUE TO DIABETES MELLITUS (H): ICD-10-CM

## 2023-08-17 DIAGNOSIS — K59.00 CONSTIPATION, UNSPECIFIED CONSTIPATION TYPE: ICD-10-CM

## 2023-08-17 DIAGNOSIS — Z78.9 TAKES DIETARY SUPPLEMENTS: ICD-10-CM

## 2023-08-17 DIAGNOSIS — E11.40 TYPE 2 DIABETES, CONTROLLED, WITH NEUROPATHY (H): ICD-10-CM

## 2023-08-17 DIAGNOSIS — R52 PAIN: ICD-10-CM

## 2023-08-17 DIAGNOSIS — M81.0 AGE-RELATED OSTEOPOROSIS WITHOUT CURRENT PATHOLOGICAL FRACTURE: ICD-10-CM

## 2023-08-17 DIAGNOSIS — I73.9 PAD (PERIPHERAL ARTERY DISEASE) (H): ICD-10-CM

## 2023-08-17 DIAGNOSIS — I10 PRIMARY HYPERTENSION: Primary | ICD-10-CM

## 2023-08-17 PROCEDURE — 99207 PR NO CHARGE LOS: CPT | Mod: VID | Performed by: PHARMACIST

## 2023-08-17 RX ORDER — ACETAMINOPHEN 500 MG
500 TABLET ORAL 3 TIMES DAILY
COMMUNITY

## 2023-08-17 RX ORDER — LISINOPRIL 2.5 MG/1
2.5 TABLET ORAL DAILY
Qty: 90 TABLET | Refills: 3 | Status: SHIPPED | OUTPATIENT
Start: 2023-08-17 | End: 2024-06-14

## 2023-08-17 NOTE — PATIENT INSTRUCTIONS
"Recommendations from today's MTM visit:                                                    MTM (medication therapy management) is a service provided by a clinical pharmacist designed to help you get the most of out of your medicines.   Today we reviewed what your medicines are for, how to know if they are working, that your medicines are safe and how to make your medicine regimen as easy as possible.      Start taking lisinopril 2.5 mg daily. Continue to monitor blood pressure regularly.  Change goal range on Freestyle Danny to be  mg/dL.     Follow-up: Follow up with me in 3 months (11/17/23 at 11:00) using a phone visit    It was great speaking with you today.  I value your experience and would be very thankful for your time in providing feedback in our clinic survey. In the next few days, you may receive an email or text message from [employer] with a link to a survey related to your  clinical pharmacist.\"     To schedule another MTM appointment, please call the clinic directly or you may call the MTM scheduling line at [phone] or toll-free at [phone].     My Clinical Pharmacist's contact information:                                                      Please feel free to contact me with any questions or concerns you have.      Jose Silver, Pharm. D., [facility]  Medication Therapy Management Pharmacist     "

## 2023-08-17 NOTE — PROGRESS NOTES
Medication Therapy Management (MTM) Encounter    ASSESSMENT:                            Medication Adherence/Access: No issues identified    Type 2 Diabetes: Patient is meeting A1c goal of < 7%. Discussed increasing the Trulicity dose to 3 mg weekly and discontinuing insulin use and patient declined. This can be revisited in the future as the patient wanted time to think about this change.     Charcot Foot: Stable.     Hypertension: Patient is meeting blood pressure goal of < 130/80mmHg. Decided with patient to decrease lisinopril dose to be taken consistently daily, rather than a higher dose as needed. Daily lisinopril is desired due to its benefits for the kidneys.     Pain: Stable.     Osteoporosis: Stable. Discussed the importance of adherence and taking his alendronate weekly.     Peripheral Artery Disease: Stable    Constipation: Stable.     Supplements: Stable.     PLAN:                            Start taking lisinopril 2.5 mg daily. Continue to monitor blood pressure regularly.  Change goal range on Freestyle Danny to be  mg/dL.     Follow-up: Follow up with me in 3 months (11/17/23 at 11:00) using a phone visit    SUBJECTIVE/OBJECTIVE:                          Amos Walker is a 78 year old male called for an initial visit. He was referred to me from Dr. Racheal Swift.     Reason for visit: med review/diabetes management    Allergies/ADRs: Reviewed in chart - removed lisinopril as an allergy  Past Medical History: Reviewed in chart  Tobacco: He reports that he has been smoking cigarettes. He has a 12.50 pack-year smoking history. He has never used smokeless tobacco.Nicotine/Tobacco Cessation Plan: not discussed today  Alcohol: not assessed at this visit    Medication Adherence/Access: no issues reported    Type 2 Diabetes :  Trulicity 1.5 mg weekly  Humalog 3-4 units TID with meals (patient reports that he takes 8 units with breakfast, 3-4 units with lunch, and 4 units with dinner)  Glucose 4 mg prn  "    Patient was recently in a transitional care unit. During his time there, his diabetes treatment was managed differently. He endorsed understanding his current at-home regimen and expressed feeling more comfortable using insulin than trying to manage his diabetes through other means (increasing Trulicity dose). He agreed to think on it and will revisit in the future.     Blood sugar monitoring: Continuous Glucose Monitor (Freestyle Danny) - time in range ( mg/dL): above 70%     Current diabetes symptoms: patient reports occasional feelings of hypoglycemia - he knows what it feels like and has the glucose tablets to correct if needed.   Diet/Exercise: not assessed at this visit  Eye exam: up to date  Foot exam: up to date  Aspirin: Yes - 81 mg every other day (for peripheral artery disease)  Statin: Yes - simvastatin 40 mg daily  Urine Albumin:   Lab Results   Component Value Date    UMALCR 47.73 (H) 2022      Lab Results   Component Value Date    A1C 6.1 (H) 2023     Charcot Foot:  Ammonium lactate 12% cream BID prn (uses \"all the time\" on his feet)  Gentamicin 0.1% cream daily   Silver sulfadiazine 1% cream as directed     Topical products have been effective - no issues reported.     Hypertension:   Lisinopril 5 mg daily    Patient reports that he does not take lisinopril daily. He only takes it if his blood pressure reading in the morning is elevated. Taking it daily has caused his blood pressure to get too low and caused him to feel light headed.    Patient self-monitors blood pressure daily.   Today's readin/60 mmHg (has not taken lisinopril today - did take yesterday 23)    BP Readings from Last 3 Encounters:   07/10/23 (!) 148/65   23 (!) 145/68   02/10/23 138/72     Pulse Readings from Last 3 Encounters:   07/10/23 79   23 94   02/10/23 100      Pain:  Acetaminophen 500 mg TID    Patient reports this works well. No issues reported.      Osteoporosis:  Alendronate " 70 mg weekly    Patient reports missing this dose sometimes. Was not interested in taking the 10 mg daily version as he thought this would disrupt his current morning regimen.     Peripheral Artery Disease:  Aspirin 81 mg every other day   Clopidogrel 75 mg daily  Simvastatin 40 mg daily    Patient reports that he bruised very easily on daily aspirin, so the frequency was decreased to every other day.     Patients reports no side effects or myalgias from simvastatin.     Constipation:  Senna-docusate 8.6/50 mg BID prn    Reports this has helped with constipation - no issues at this time.     Supplements:   Ascorbic acid 500 mg daily   Calcium carbonate 500 mg twice daily   Ferrous sulfate 325 mg daily  Vitamin D 1000 units BID  Vitamin B complex daily    No reported issues at this time.     Today's Vitals: There were no vitals taken for this visit.  ----------------      I spent 37 minutes with this patient today. All changes were made via collaborative practice agreement with Racheal Swift MD. A copy of the visit note was provided to the patient's provider(s).    A summary of these recommendations was sent via [facility].    Beryl Webb, 4th Year Student Pharmacist     Preceptor Cosignature: I have reviewed and verified the student's documentation.    Jose Silver, Pharm. D., BCACP  Medication Therapy Management Pharmacist  Direct Voicemail: [phone]      Telemedicine Visit Details  Type of service:  Telephone visit  Start Time:  9:00  End Time:  9:37     Medication Therapy Recommendations  HTN (hypertension)    Current Medication: lisinopril (ZESTRIL) 5 MG tablet (Discontinued)   Rationale: Dose too high - Dosage too high - Safety   Recommendation: Decrease Dose - lisinopril 2.5 MG tablet - 1 tablet by mouth daily   Status: Accepted per CPA   Note: Patient was taking 5 mg as needed. It is ideal to take a lower dose every day.

## 2023-08-18 ENCOUNTER — MEDICAL CORRESPONDENCE (OUTPATIENT)
Dept: INTERNAL MEDICINE | Facility: CLINIC | Age: 78
End: 2023-08-18
Payer: COMMERCIAL

## 2023-08-21 ENCOUNTER — DOCUMENTATION ONLY (OUTPATIENT)
Dept: INTERNAL MEDICINE | Facility: CLINIC | Age: 78
End: 2023-08-21
Payer: COMMERCIAL

## 2023-08-21 NOTE — PROGRESS NOTES
Type of Form Received:     Form Received (Date) 8/21/23   Form Filled out Yes, faxed 9/11   Placed in provider folder Yes

## 2023-08-25 ENCOUNTER — TELEPHONE (OUTPATIENT)
Dept: INTERNAL MEDICINE | Facility: CLINIC | Age: 78
End: 2023-08-25
Payer: COMMERCIAL

## 2023-08-25 NOTE — TELEPHONE ENCOUNTER
[facility] Call Center    Phone Message    May a detailed message be left on voicemail: yes     Reason for Call: Order(s): Home Care Orders: Other: Needs physicians signature on order # [id number], please fax back to [phone]. If physician can't sign by Wednesday please have covering physician sign to stay within Medicare guideline, thank you.      Action Taken: Message routed to:  [facility] ([facility] PCC    Travel Screening: Not Applicable

## 2023-09-06 ENCOUNTER — OFFICE VISIT (OUTPATIENT)
Dept: PODIATRY | Facility: CLINIC | Age: 78
End: 2023-09-06
Payer: COMMERCIAL

## 2023-09-06 DIAGNOSIS — L97.522 SKIN ULCER OF SECOND TOE OF LEFT FOOT WITH FAT LAYER EXPOSED (H): ICD-10-CM

## 2023-09-06 DIAGNOSIS — E11.49 TYPE II OR UNSPECIFIED TYPE DIABETES MELLITUS WITH NEUROLOGICAL MANIFESTATIONS, NOT STATED AS UNCONTROLLED(250.60) (H): ICD-10-CM

## 2023-09-06 DIAGNOSIS — I87.8 VENOUS STASIS: ICD-10-CM

## 2023-09-06 DIAGNOSIS — E11.51 DIABETES MELLITUS WITH PERIPHERAL VASCULAR DISEASE (H): Primary | ICD-10-CM

## 2023-09-06 DIAGNOSIS — E11.610 CHARCOT FOOT DUE TO DIABETES MELLITUS (H): ICD-10-CM

## 2023-09-06 PROCEDURE — 99214 OFFICE O/P EST MOD 30 MIN: CPT | Performed by: PODIATRIST

## 2023-09-06 RX ORDER — CEPHALEXIN 500 MG/1
500 CAPSULE ORAL 3 TIMES DAILY
Qty: 42 CAPSULE | Refills: 0 | Status: SHIPPED | OUTPATIENT
Start: 2023-09-06 | End: 2023-09-11 | Stop reason: SINTOL
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
## 2023-09-11 ENCOUNTER — TELEPHONE (OUTPATIENT)
Dept: VASCULAR SURGERY | Facility: CLINIC | Age: 78
End: 2023-09-11

## 2023-09-11 ENCOUNTER — OFFICE VISIT (OUTPATIENT)
Dept: INTERNAL MEDICINE | Facility: CLINIC | Age: 78
End: 2023-09-11
Payer: COMMERCIAL

## 2023-09-11 VITALS
OXYGEN SATURATION: 100 % | SYSTOLIC BLOOD PRESSURE: 125 MMHG | DIASTOLIC BLOOD PRESSURE: 69 MMHG | BODY MASS INDEX: 21.94 KG/M2 | WEIGHT: 171 LBS | HEART RATE: 85 BPM | HEIGHT: 74 IN

## 2023-09-11 DIAGNOSIS — I10 PRIMARY HYPERTENSION: ICD-10-CM

## 2023-09-11 DIAGNOSIS — S72.144A CLOSED NONDISPLACED INTERTROCHANTERIC FRACTURE OF RIGHT FEMUR, INITIAL ENCOUNTER (H): ICD-10-CM

## 2023-09-11 DIAGNOSIS — I70.243 ATHEROSCLEROSIS OF NATIVE ARTERY OF LEFT LOWER EXTREMITY WITH ULCERATION OF ANKLE (H): ICD-10-CM

## 2023-09-11 DIAGNOSIS — I73.9 PAD (PERIPHERAL ARTERY DISEASE) (H): Primary | ICD-10-CM

## 2023-09-11 DIAGNOSIS — F17.200 TOBACCO DEPENDENCE SYNDROME: ICD-10-CM

## 2023-09-11 DIAGNOSIS — E11.610 CHARCOT FOOT DUE TO DIABETES MELLITUS (H): ICD-10-CM

## 2023-09-11 DIAGNOSIS — M80.00XD AGE-RELATED OSTEOPOROSIS WITH CURRENT PATHOLOGICAL FRACTURE WITH ROUTINE HEALING: ICD-10-CM

## 2023-09-11 DIAGNOSIS — I73.9 PVD (PERIPHERAL VASCULAR DISEASE) (H): Primary | ICD-10-CM

## 2023-09-11 DIAGNOSIS — S52.022D CLOSED FRACTURE OF OLECRANON PROCESS OF LEFT ULNA WITH ROUTINE HEALING, SUBSEQUENT ENCOUNTER: ICD-10-CM

## 2023-09-11 DIAGNOSIS — I73.9 PAD (PERIPHERAL ARTERY DISEASE) (H): ICD-10-CM

## 2023-09-11 DIAGNOSIS — Z87.891 PERSONAL HISTORY OF NICOTINE DEPENDENCE: ICD-10-CM

## 2023-09-11 DIAGNOSIS — S72.002D CLOSED FRACTURE OF NECK OF LEFT FEMUR WITH ROUTINE HEALING, SUBSEQUENT ENCOUNTER: ICD-10-CM

## 2023-09-11 DIAGNOSIS — E11.40 TYPE 2 DIABETES, CONTROLLED, WITH NEUROPATHY (H): ICD-10-CM

## 2023-09-11 DIAGNOSIS — I70.229 CRITICAL LOWER LIMB ISCHEMIA (H): ICD-10-CM

## 2023-09-11 PROCEDURE — 99215 OFFICE O/P EST HI 40 MIN: CPT | Performed by: INTERNAL MEDICINE

## 2023-09-11 RX ORDER — ALBUTEROL SULFATE 0.83 MG/ML
2.5 SOLUTION RESPIRATORY (INHALATION)
Status: CANCELLED | OUTPATIENT
Start: 2023-09-18

## 2023-09-11 RX ORDER — ALBUTEROL SULFATE 90 UG/1
1-2 AEROSOL, METERED RESPIRATORY (INHALATION)
Status: CANCELLED
Start: 2023-09-18

## 2023-09-11 RX ORDER — ZOLEDRONIC ACID 5 MG/100ML
5 INJECTION, SOLUTION INTRAVENOUS ONCE
Status: CANCELLED
Start: 2023-09-18

## 2023-09-11 RX ORDER — LEVOFLOXACIN 750 MG/1
750 TABLET, FILM COATED ORAL DAILY
Qty: 14 TABLET | Refills: 0 | Status: SHIPPED | OUTPATIENT
Start: 2023-09-11 | End: 2023-10-03

## 2023-09-11 RX ORDER — METHYLPREDNISOLONE SODIUM SUCCINATE 125 MG/2ML
125 INJECTION, POWDER, LYOPHILIZED, FOR SOLUTION INTRAMUSCULAR; INTRAVENOUS
Status: CANCELLED
Start: 2023-09-18

## 2023-09-11 RX ORDER — HEPARIN SODIUM,PORCINE 10 UNIT/ML
5-20 VIAL (ML) INTRAVENOUS DAILY PRN
Status: CANCELLED | OUTPATIENT
Start: 2023-09-18

## 2023-09-11 RX ORDER — MEPERIDINE HYDROCHLORIDE 25 MG/ML
25 INJECTION INTRAMUSCULAR; INTRAVENOUS; SUBCUTANEOUS EVERY 30 MIN PRN
Status: CANCELLED | OUTPATIENT
Start: 2023-09-18

## 2023-09-11 RX ORDER — DIPHENHYDRAMINE HYDROCHLORIDE 50 MG/ML
50 INJECTION INTRAMUSCULAR; INTRAVENOUS
Status: CANCELLED
Start: 2023-09-18

## 2023-09-11 RX ORDER — EPINEPHRINE 1 MG/ML
0.3 INJECTION, SOLUTION, CONCENTRATE INTRAVENOUS EVERY 5 MIN PRN
Status: CANCELLED | OUTPATIENT
Start: 2023-09-18

## 2023-09-11 RX ORDER — HEPARIN SODIUM (PORCINE) LOCK FLUSH IV SOLN 100 UNIT/ML 100 UNIT/ML
5 SOLUTION INTRAVENOUS
Status: CANCELLED | OUTPATIENT
Start: 2023-09-18

## 2023-09-11 NOTE — PROGRESS NOTES
History of Present Illness:  Mr. Walker is a 77 year old adult who presents for  Chief Complaint   Patient presents with    Follow Up     Sore on left ankle that is no longer draining, feet swelling, sores on feet, diarrhea after Cephalexin antibiotic, discuss medications, letter for driving     PMH involving tobacco use, DM, HTN, CAD, PAD, Right Charcot foot, diabetic neuropathy, emphysema, tobacco use, anemia of chronic disease, MGUS.    Was hospitalized this summer from hip fall, s/p R hemiarthroplasty 6/30/23, wants to continue PT at outside facility    He has a chronic wound on his ankle, seeing Podiatry, hasn't seen vascular recently    He is on lispro and trulicity, 4-8 units TID, he uses freestyle Danny  Stopped lisinopril, has 2.5 mg at home,hesitant to take given low bp at times    Tremors with writing only, wants to keep an eye on it. Defers neurology visit.    He sometimes forgets to take alendronate      Routine Health Maintenance  Immunizations:   Most Recent Immunizations   Administered Date(s) Administered    COVID-19,PF,Pfizer 05/06/2021    Influenza (High Dose) 3 valent vaccine 10/04/2019    Influenza (IIV3) PF 11/01/2013    Influenza, Quad, High Dose, Pf, 65yr + 10/05/2020    Pneumo Conj 13-V (2010&after) 11/03/2014    Pneumococcal 23 valent 12/21/2015    TDAP Vaccine (Boostrix) 03/21/2016      Lipids:   Recent Labs   Lab Test 09/08/22  1645 08/16/21  1224 12/17/15  0942 11/18/14  0853   CHOL 124 109   < > 110   HDL 49 60   < > 45   LDL 49 33   < > 45   TRIG 129 80   < > 100   CHOLHDLRATIO  --   --   --  2.4    < > = values in this interval not displayed.      PSA (50-75 yrs): No results found for: PSA  DEXA: 7/21 FRAX elevated, multiple fractures, qualifies for treatment, started alendronate 5/22  AAA Screening (65-75 yrs): CT 12/17, negative  Lung Ca Screening (>30 py 55-79 or >20 py 50-79 + RF): 7/20, 7/21, 7/22, due 7/23  Colonoscopy (50-75 yrs): due 6/24, 6/19 Impression:          - The  examined portion of the ileum was normal.                        - One 5 mm polyp in the cecum, removed with a cold snare                        and removed using injection-lift and a cold snare.                        Resected and retrieved.                        - One 6 mm polyp in the transverse colon, removed with a                        hot snare and removed using injection-lift and a hot                        snare. Resected and retrieved. Clip was placed.                        - One 5 mm polyp in the sigmoid colon, removed with a                        hot snare. Resected and retrieved.                        - The examination was otherwise normal on direct and                        retroflexion views.                        NOTE: the polyp reported as being in the descending                        colon in the prior colonoscopy report appears on images                        in the transverse colon; that is where we removed a                        likely SSA. The descending colon was inspected on                        multiple occasions and no polyps were identified.   Recommendation:      - Return to referring physician.                        - Repeat colonoscopy in 5 years for surveillance.                                                                           HIV/HCV if risk factors: nonreactive HepC 6/16  Safety/Lifestyle: reviewed  Tob/EtOH: declines quitting  Depression:   PHQ-2 Score:     PHQ-2 ( 1999 Pfizer) 1/5/2021 5/18/2020   Q1: Little interest or pleasure in doing things 0 0   Q2: Feeling down, depressed or hopeless 1 1   PHQ-2 Score 1 1                           A detailed Review of Systems was performed, verified and is negative except as documented in the HPI.  All health questionnaires were reviewed, verified and relevant information documented above.      Past Medical History:  Past Medical History:   Diagnosis Date    Anemia     CAD (coronary artery disease)     2V CAD involving  LAD and RCA, s/p DESx4 in 3/18    CKD (chronic kidney disease) stage 3, GFR 30-59 ml/min (H)     Colon polyp     Diabetic Charcot foot (H)     Emphysema of lung (H)     noted on CT    Heart disease     HTN (hypertension)     Hyperlipidemia     MRSA cellulitis of right foot     in past.     Osteopenia of both hips     PAD (peripheral artery disease) (H) 09/2018    s/p R femoral enarterectomy and stenting     Tobacco use     50+ pack    Type 2 diabetes mellitus (H)     for 25 yrs.  on insulin and starlix    Venous ulcer (H)        Active Meds:  Current Outpatient Medications   Medication    acetaminophen (TYLENOL) 500 MG tablet    alendronate (FOSAMAX) 70 MG tablet    ammonium lactate (LAC-HYDRIN) 12 % external cream    ascorbic acid 500 MG TABS    aspirin 81 MG EC tablet    calcium carbonate (OS-CLAUDIA) 500 MG tablet    clopidogrel (PLAVIX) 75 MG tablet    Continuous Blood Gluc  (FREESTYLE LATOYA 14 DAY READER) TANIA    Continuous Blood Gluc Sensor (FREESTYLE LATOYA 14 DAY SENSOR) MISC    dulaglutide (TRULICITY) 1.5 MG/0.5ML pen    ferrous sulfate (FEROSUL) 325 (65 Fe) MG tablet    gentamicin (GARAMYCIN) 0.1 % external cream    glucose (BD GLUCOSE) 4 g chewable tablet    insulin lispro (HUMALOG KWIKPEN) 100 UNIT/ML (1 unit dial) KWIKPEN    insulin pen needle (B-D U/F) 31G X 8 MM miscellaneous    senna-docusate (SENOKOT-S/PERICOLACE) 8.6-50 MG tablet    silver sulfADIAZINE (SSD) 1 % external cream    simvastatin (ZOCOR) 40 MG tablet    vitamin B-Complex    VITAMIN D, CHOLECALCIFEROL, PO    levofloxacin (LEVAQUIN) 750 MG tablet    lisinopril (ZESTRIL) 2.5 MG tablet     No current facility-administered medications for this visit.        Allergies:  Reviewed, refer to EMR    Relevant Social History:  Social History     Tobacco Use    Smoking status: Every Day     Packs/day: 0.25     Years: 50.00     Pack years: 12.50     Types: Cigarettes    Smokeless tobacco: Never   Substance Use Topics    Alcohol use: No    Drug  "use: No        Physical Exam:  Vitals: /69 (BP Location: Left arm, Patient Position: Sitting, Cuff Size: Adult Regular)   Pulse 85   Ht 1.88 m (6' 2\")   Wt 77.6 kg (171 lb)   SpO2 100%   BMI 21.96 kg/m    Constitutional: Alert, oriented, pleasant, no acute distress  Head: Normocephalic, atraumatic  Eyes: Extra-ocular movements intact, pupils equally round bilaterally, no scleral icterus  Cardiovascular: Regular rate and rhythm, no murmurs, rubs or gallops, peripheral pulses full/symmetric  Respiratory: Good air movement bilaterally, lungs clear, no wheezes/rales/rhonchi  Musculoskeletal: No edema, normal muscle tone, normal gait  Neurologic: Alert and oriented, cranial nerves 2-12 intact, grossly non-focal  Skin: Edema of LLE, 1+  Psychiatric: normal mentation, affect and mood      Diagnostics:  Labs reviewed in Epic          Assessment and Plan:  Amos was seen today for follow up.    Diagnoses and all orders for this visit:    PVD (peripheral vascular disease) (H)  -     Lipid panel reflex to direct LDL Fasting; Future  -     Vascular Surgery Referral; Future    Primary hypertension  -     **Comprehensive metabolic panel FUTURE 2mo; Future    Type 2 diabetes, controlled, with neuropathy (H)  -     Hemoglobin A1c; Future  -     Lipid panel reflex to direct LDL Fasting; Future  -     Albumin Random Urine Quantitative with Creat Ratio; Future    Closed fracture of neck of left femur with routine healing, subsequent encounter  -     Physical Therapy Referral; Future    Charcot foot due to diabetes mellitus (H)  -     Vascular Surgery Referral; Future    PAD (peripheral artery disease) (H)  -     Vascular Surgery Referral; Future    Tobacco dependence syndrome  -     CT Chest Lung Cancer Scrn Low Dose wo; Future    Personal history of nicotine dependence  -     CT Chest Lung Cancer Scrn Low Dose wo; Future    Age-related osteoporosis with current pathological fracture with routine healing  Closed fracture of " olecranon process of left ulna with routine healing, subsequent encounter  Closed nondisplaced intertrochanteric fracture of right femur, initial encounter (H)  Other orders  Patient having difficulty with weekly administration of alendronate. Discussed possible alternatives and he is interested in IV reclast.  Will submit orders for insurance authorization.  -     0.9% sodium chloride BOLUS  -     sodium chloride (PF) 0.9% PF flush 3-20 mL  -     heparin lock flush 10 UNIT/ML injection 5-20 mL  -     heparin 100 unit/mL injection 5 mL  -     alteplase (CATHFLO ACTIVASE) injection 2 mg  -     zoledronic Acid (RECLAST) infusion 5 mg  -     diphenhydrAMINE (BENADRYL) injection 50 mg  -     famotidine (PEPCID) injection 20 mg  -     methylPREDNISolone sodium succinate (solu-MEDROL) injection 125 mg  -     EPINEPHrine PF (ADRENALIN) injection 0.3 mg  -     0.9% sodium chloride BOLUS  -     albuterol (PROVENTIL HFA/VENTOLIN HFA) inhaler  -     albuterol (PROVENTIL) neb solution 2.5 mg  -     meperidine (DEMEROL) injection 25 mg            Racheal Swift MD  Internal Medicine    >40 minutes spent today performing chart review, history and exam, counseling, care coordination, documentation and further activities as noted above exclusive of any procedures or EKG interpretation

## 2023-09-11 NOTE — NURSING NOTE
Amos Walker is a 78 year old male patient that presents today in clinic for the following:    Chief Complaint   Patient presents with    Follow Up     Sore on left ankle that is no longer draining, feet swelling, sores on feet, diarrhea after Cephalexin antibiotic, discuss medications, letter for driving     The patient's allergies and medications were reviewed as noted. A set of vitals were recorded as noted without incident. The patient does not have any other questions for the provider.    Leti Mcdowell, EMT at 10:42 AM on 9/11/2023

## 2023-09-11 NOTE — TELEPHONE ENCOUNTER
I called the pt to schedule the requested referral appointment referral ordered by Kasey Swift.  The DONE note states schedule with Ely Zamudio and to have imaging done prior.  The pt is requesting to be seen by  or an MD. The pt is asking if surgery is needed he would need to be seen by an MD?  The pt states that he would like a nurse to call him to answer his questions.  Mehran Joe on 9/11/2023 at 6:12 PM

## 2023-09-12 ENCOUNTER — OFFICE VISIT (OUTPATIENT)
Dept: PODIATRY | Facility: CLINIC | Age: 78
End: 2023-09-12
Payer: COMMERCIAL

## 2023-09-12 ENCOUNTER — DOCUMENTATION ONLY (OUTPATIENT)
Dept: INTERNAL MEDICINE | Facility: CLINIC | Age: 78
End: 2023-09-12

## 2023-09-12 DIAGNOSIS — E11.51 DIABETES MELLITUS WITH PERIPHERAL VASCULAR DISEASE (H): Primary | ICD-10-CM

## 2023-09-12 DIAGNOSIS — E11.49 TYPE II OR UNSPECIFIED TYPE DIABETES MELLITUS WITH NEUROLOGICAL MANIFESTATIONS, NOT STATED AS UNCONTROLLED(250.60) (H): ICD-10-CM

## 2023-09-12 DIAGNOSIS — L97.522 SKIN ULCER OF SECOND TOE OF LEFT FOOT WITH FAT LAYER EXPOSED (H): ICD-10-CM

## 2023-09-12 DIAGNOSIS — I87.8 VENOUS STASIS: ICD-10-CM

## 2023-09-12 DIAGNOSIS — E11.610 CHARCOT FOOT DUE TO DIABETES MELLITUS (H): ICD-10-CM

## 2023-09-12 PROCEDURE — 99214 OFFICE O/P EST MOD 30 MIN: CPT | Performed by: PODIATRIST
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
## 2023-09-12 NOTE — PROGRESS NOTES
Type of Form Received:     Form Received (Date) 9/12/23   Form Filled out Yes, faxed 10/23   Placed in provider folder Yes
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
## 2023-09-12 NOTE — TELEPHONE ENCOUNTER
Attempted to contact pt to discuss - reached his voicemail. LVM w/ callback.    JOSE Cruz, RN  RN Care Coordinator  [facility] Vascular Surgery - [facility] phone: [phone]  Fax: [phone]

## 2023-09-14 NOTE — TELEPHONE ENCOUNTER
2nd attempt to contact pt to discuss his concerns. I reached his voicemail. I let him know we can get him set p for follow-up with Dr Shipley if that is his preference. Routed to scheduling.    ALEXSANDRA CruzN, RN  RN Care Coordinator  [facility] Vascular Surgery - [facility] phone: [phone]  Fax: [phone]

## 2023-09-15 NOTE — TELEPHONE ENCOUNTER
The is no imaging orders entered to scheduled the pt for vein mapping.  Mehran Joe on 9/15/2023 at 6:01 PM

## 2023-09-18 ENCOUNTER — OFFICE VISIT (OUTPATIENT)
Dept: PODIATRY | Facility: CLINIC | Age: 78
End: 2023-09-18
Payer: COMMERCIAL

## 2023-09-18 DIAGNOSIS — L97.522 SKIN ULCER OF SECOND TOE OF LEFT FOOT WITH FAT LAYER EXPOSED (H): ICD-10-CM

## 2023-09-18 DIAGNOSIS — E11.49 TYPE II OR UNSPECIFIED TYPE DIABETES MELLITUS WITH NEUROLOGICAL MANIFESTATIONS, NOT STATED AS UNCONTROLLED(250.60) (H): ICD-10-CM

## 2023-09-18 DIAGNOSIS — I87.8 VENOUS STASIS: ICD-10-CM

## 2023-09-18 DIAGNOSIS — E11.51 DIABETES MELLITUS WITH PERIPHERAL VASCULAR DISEASE (H): Primary | ICD-10-CM

## 2023-09-18 DIAGNOSIS — L84 TYLOMA: ICD-10-CM

## 2023-09-18 PROCEDURE — 99214 OFFICE O/P EST MOD 30 MIN: CPT | Performed by: PODIATRIST
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
## 2023-09-19 ENCOUNTER — DOCUMENTATION ONLY (OUTPATIENT)
Dept: INTERNAL MEDICINE | Facility: CLINIC | Age: 78
End: 2023-09-19
Payer: COMMERCIAL

## 2023-09-19 NOTE — PROGRESS NOTES
Type of Form Received:     Form Received (Date) 9/19/23   Form Filled out Yes, faxed 9/25   Placed in provider folder Yes

## 2023-09-19 NOTE — TELEPHONE ENCOUNTER
The pt is scheduled on 10/17 at the [facility] for imaging and with Maria Dolores Joe on 9/19/2023 at 2:32 PM     The pt was on the phone during scheduling of the above appointments and agreed to the dates times and locations of the appointments.

## 2023-09-22 ENCOUNTER — MEDICAL CORRESPONDENCE (OUTPATIENT)
Dept: INTERNAL MEDICINE | Facility: CLINIC | Age: 78
End: 2023-09-22
Payer: COMMERCIAL

## 2023-09-26 NOTE — MR AVS SNAPSHOT
After Visit Summary   2017    Amos Walker    MRN: [id number]           Patient Information     Date Of Birth          1945        Visit Information        Provider Department      2017 8:30 AM Brayan Mcclain DPM [facility]        Today's Diagnoses     Skin ulcer of right foot with fat layer exposed (H)    -  1    Ulcer of right lower extremity with fat layer exposed (H)        Type II or unspecified type diabetes mellitus with neurological manifestations, not stated as uncontrolled(250.60) (H)        Diabetes mellitus with peripheral vascular disease (H)          Care Instructions    Thanks for coming today.  [facility]  [address] [address] [place] [address]    To schedule future appointments in Ortho Clinic, you may call [phone].    To schedule ordered imaging by your provider:   Call Central Imaging [phone]    To schedule an injection ordered by your provider:  Call Central Imaging Injection scheduling line: [phone]  [employer] available online at:  [email or web address]    Please call if any further questions or concerns ([phone]).  Clinic hours 8 am to 5 pm.    Return to clinic (call) if symptoms worsen or fail to improve.            Follow-ups after your visit        Follow-up notes from your care team     Return in 4 days (on 2017).      Your next 10 appointments already scheduled     2017  7:30 AM CST   Return Visit with Brayan Mcclain DPM   [facility])    [address] [place] [address]   [phone]            2017 10:00 AM CST   Return Visit with Brayan Mcclain DPM   [facility]    [address] [place] [address] [address] [facility] [address]   [phone]            2017 11:40 AM CST   (Arrive by 11:25 AM)   Return Vascular Visit with Lane Jenkins MD       Patient placed on Ulta wound vac in OR by Dr. Eddy.  Orders received to change wound vac on 9/29/23.  Updated bedside RN and patient.  All patient questions were answered at this time.    [facility] ([facility])    [address] [facility]  3rd Floor  [facility] [address]   [phone]            Dec 05, 2017 10:00 AM CST   Return Visit with Brayan Mcclain DPM   [facility]    [address] [place] [address]   [phone]            Dec 12, 2017 10:00 AM CST   Return Visit with Brayan Mcclain DPM   [facility] ([facility])    [address] [place] [address]   [phone]            Dec 19, 2017 10:00 AM CST   Return Visit with Brayan Mcclain DPM   [facility] ([facility])    [address] [place] [address]   [phone]            Dec 29, 2017 11:25 AM CST   (Arrive by 11:10 AM)   Return Visit with Racheal Swift MD   [facility] ([facility])    [address] [facility]  4th Floor  [facility] [address]   [phone]            Jun 20, 2018 10:30 AM CDT   RETURN RETINA with Jen Aranda MD   [facility] ([facility])    [facility]  [address] [place] [address]  [address] [address]  [facility] [address]   [phone]              Who to contact     If you have questions or need follow up information about today's clinic visit or your schedule please contact [facility] directly at [phone].  Normal or non-critical lab and imaging results will be communicated to you by MyChart, letter or phone within 4 business days after the clinic has received the results. If you do not hear from us within 7 days, please contact the clinic through MyChart or phone. If you have a critical or abnormal lab result, we will notify you by phone as soon as possible.  Submit refill requests through [employer] or call your pharmacy and they will forward the refill request to us. Please allow 3 business days for your refill to be  completed.          Additional Information About Your Visit        Well[employer] Information     C[employer] gives you secure access to your electronic health record. If you see a primary care provider, you can also send messages to your care team and make appointments. If you have questions, please call your primary care clinic.  If you do not have a primary care provider, please call [phone] and they will assist you.      Cribspot is an electronic gateway that provides easy, online access to your medical records. With [employer], you can request a clinic appointment, read your test results, renew a prescription or communicate with your care team.     To access your existing account, please contact your [facility] or call [phone] for assistance.        Care EveryWhere ID     This is your Care EveryWhere ID. This could be used by other organizations to access your [place] medical records  [id number]        Your Vitals Were     Pulse Temperature Pulse Oximetry             98 98.1  F (36.7  C) 98%          Blood Pressure from Last 3 Encounters:   11/17/17 138/60   11/14/17 138/64   10/31/17 139/68    Weight from Last 3 Encounters:   10/19/17 83.5 kg (184 lb)   10/10/17 81.2 kg (179 lb)   09/11/17 77.7 kg (171 lb 6.4 oz)              Today, you had the following     No orders found for display       Primary Care Provider Office Phone # Fax #    Rachael Swift [phone]       [address] [place] [address]  [facility] [address]        Equal Access to Services     SAMSON MELTON : Hadii aad ku hadasho Soomaali, waaxda luqadaha, qaybta kaalmada adeegyada, waxanna bhargavi duran . So [facility] [phone].    ATENCIÓN: Si habla español, tiene a rodrigues disposición servicios gratuitos de asistencia lingüística. Llame al [phone].    We comply with applicable federal civil rights laws and [place] laws. We do not discriminate on the basis of race, color, national  origin, age, disability, sex, sexual orientation, or gender identity.            Thank you!     Thank you for choosing [facility]  for your care. Our goal is always to provide you with excellent care. Hearing back from our patients is one way we can continue to improve our services. Please take a few minutes to complete the written survey that you may receive in the mail after your visit with us. Thank you!             Your Updated Medication List - Protect others around you: Learn how to safely use, store and throw away your medicines at www.disposemymeds.org.          This list is accurate as of: 11/17/17  9:10 AM.  Always use your most recent med list.                   Brand Name Dispense Instructions for use Diagnosis    acetaminophen 500 MG tablet    TYLENOL     Take 2 tablets (1,000 mg) by mouth 3 times daily    Closed fracture of neck of right femur, initial encounter (H)       ammonium lactate 12 % cream    LAC-HYDRIN    385 g    Apply topically 2 times daily as needed for dry skin    Venous stasis, Type 2 diabetes, controlled, with neuropathy (H)       ascorbic acid 500 MG Tabs     30 tablet    Take 1 tablet (500 mg) by mouth 2 times daily    Ulcer of right lower leg, with fat layer exposed (H)       blood glucose monitoring lancets     3 Box    Use to test blood sugars 2 as directed.    Type 2 diabetes, uncontrolled, with neuropathy (H)       blood glucose monitoring test strip    ONETOUCH ULTRA    60 strip    Use to test blood sugars 2 times daily or as directed.    Type 2 diabetes mellitus (H)       Cholecalciferol 3000 UNITS Tabs     30 tablet    Take 3,000 Units by mouth daily    Closed fracture of neck of right femur, initial encounter (H)       clindamycin 300 MG capsule    CLEOCIN    30 capsule    Take 1 capsule (300 mg) by mouth 3 times daily    Ulcer of leg, chronic, right, with fat layer exposed (H), Skin ulcer of right foot with fat layer exposed (H), Type II or unspecified  "type diabetes mellitus with neurological manifestations, not stated as uncontrolled(250.60) (H)       clopidogrel 75 MG tablet    PLAVIX    30 tablet    Take 1 tablet (75 mg) by mouth daily    Peripheral vascular disease, unspecified       COLACE PO           ferrous sulfate 325 (65 FE) MG tablet    IRON    60 tablet    Take 1 tablet (325 mg) by mouth 2 times daily    Peripheral vascular disease, unspecified       gentamicin 0.1 % cream    GARAMYCIN    30 g    Apply topically daily To right leg ulcer.    Ulcer of right lower leg, with fat layer exposed (H), Chronic venous hypertension with ulcer involving right side (H), Type 2 diabetes, controlled, with neuropathy (H)       hydrocortisone 0.2 % cream    WESTCORT    15 g    Apply sparingly to affected area twice times daily for 14 days.    Dermatitis       insulin glargine 100 UNIT/ML injection    LANTUS     Inject 28 Units Subcutaneous every morning        insulin pen needle 31G X 8 MM    B-D U/F    100 each    Use 1 daily o as directed    Diabetes mellitus, type II (H)       levofloxacin 750 MG tablet    LEVAQUIN    14 tablet    Take 1 tablet (750 mg) by mouth daily    Ulcer of leg, chronic, right, with fat layer exposed (H), Skin ulcer of right foot with fat layer exposed (H), Type II or unspecified type diabetes mellitus with neurological manifestations, not stated as uncontrolled(250.60) (H)       LISINOPRIL PO      Take 20 mg by mouth 2 times daily        methocarbamol 750 MG tablet    ROBAXIN    45 tablet    Take 1 tablet (750 mg) by mouth 4 times daily    Closed fracture of neck of right femur, initial encounter (H)       nateglinide 120 MG tablet    STARLIX    90 tablet    TAKE 1 TABLET BY MOUTH THREE TIMES DAILY BEFORE MEALS    Type 2 diabetes, controlled, with neuropathy (H)       OPTIFOAM 6\"X6\" Pads     1 each    1 Box once a week    Ulcer of right leg, with fat layer exposed (H)       * order for DME     2 each    Please measure and distribute 1 pair of " 20mm Hg - 30mm Hg knee high ULCER CARE open or closed toe compression stockings.  Patient has a size 13 foot and please take this into consideration.  Jobst or equivalent    Varicose veins of lower extremities with other complications, Venous stasis ulcer of right lower extremity (H)       * order for DME     3 each    Please measure and distribute 1 pair of 20mmHg - 30mmHg knee high open or closed toe compression stockings. Jobst ultrasheer or equivalent.    Varicose veins of both lower extremities with complications       * order for DME     2 each    Please measure and distribute 1 pair of 30mmHg - 40mmHg knee high open toe ulcercare compression stockings. Jobst ultrasheer or equivalent.    Varicose veins of bilateral lower extremities with other complications       oxyCODONE IR 5 MG tablet    ROXICODONE     Take 1-2 tablets (5-10 mg) by mouth every 3 hours as needed for moderate to severe pain And scheduled Oxycodone 10 mg at 0800 and 1300 prior to therapies    Closed fracture of neck of right femur, initial encounter (H)       senna-docusate 8.6-50 MG per tablet    SENOKOT-S;PERICOLACE    100 tablet    Take 2 tablets by mouth 2 times daily as needed for constipation    Constipation, unspecified constipation type       sildenafil 50 MG tablet    VIAGRA    10 tablet    Take 1 tablet (50 mg) by mouth daily as needed for erectile dysfunction    Vasculogenic erectile dysfunction, unspecified vasculogenic erectile dysfunction type       * silver sulfADIAZINE 1 % cream    SILVADENE    85 g    Apply topically daily To affected areas on right foot and leg.    Ulcer of right lower leg, with fat layer exposed (H), Chronic venous hypertension with ulcer involving right side (H), Type 2 diabetes, controlled, with neuropathy (H)       * SILVADENE 1 % cream   Generic drug:  silver sulfADIAZINE     20 g    Apply topically daily Dispensed at visit for heel and 5th mt ulcers.        simvastatin 10 MG tablet    ZOCOR    90 tablet     Take 1 tablet (10 mg) by mouth At Bedtime    Type 2 diabetes, controlled, with neuropathy (H)       sitagliptin 100 MG tablet    JANUVIA    90 tablet    Take 1 tablet (100 mg) by mouth daily    Type 2 diabetes, controlled, with neuropathy (H)       triamcinolone 0.1 % cream    KENALOG    30 g    Apply sparingly to left heel daily.    Dermatitis of left foot       * Notice:  This list has 5 medication(s) that are the same as other medications prescribed for you. Read the directions carefully, and ask your doctor or other care provider to review them with you.

## 2023-09-27 ENCOUNTER — MEDICAL CORRESPONDENCE (OUTPATIENT)
Dept: HEALTH INFORMATION MANAGEMENT | Facility: CLINIC | Age: 78
End: 2023-09-27

## 2023-09-27 ENCOUNTER — TRANSFERRED RECORDS (OUTPATIENT)
Dept: HEALTH INFORMATION MANAGEMENT | Facility: CLINIC | Age: 78
End: 2023-09-27

## 2023-10-02 ENCOUNTER — DOCUMENTATION ONLY (OUTPATIENT)
Dept: INTERNAL MEDICINE | Facility: CLINIC | Age: 78
End: 2023-10-02
Payer: COMMERCIAL

## 2023-10-02 NOTE — PROGRESS NOTES
Type of Form Received:     Form Received (Date) 10/2/23   Form Filled out Yes 10/11/23   Placed in provider folder Yes

## 2023-10-03 ENCOUNTER — OFFICE VISIT (OUTPATIENT)
Dept: PODIATRY | Facility: CLINIC | Age: 78
End: 2023-10-03
Payer: COMMERCIAL

## 2023-10-03 DIAGNOSIS — E11.610 CHARCOT FOOT DUE TO DIABETES MELLITUS (H): ICD-10-CM

## 2023-10-03 DIAGNOSIS — L97.522 SKIN ULCER OF SECOND TOE OF LEFT FOOT WITH FAT LAYER EXPOSED (H): ICD-10-CM

## 2023-10-03 DIAGNOSIS — E11.49 TYPE II OR UNSPECIFIED TYPE DIABETES MELLITUS WITH NEUROLOGICAL MANIFESTATIONS, NOT STATED AS UNCONTROLLED(250.60) (H): ICD-10-CM

## 2023-10-03 DIAGNOSIS — E11.51 DIABETES MELLITUS WITH PERIPHERAL VASCULAR DISEASE (H): ICD-10-CM

## 2023-10-03 DIAGNOSIS — I87.8 VENOUS STASIS: ICD-10-CM

## 2023-10-03 PROCEDURE — 99214 OFFICE O/P EST MOD 30 MIN: CPT | Performed by: PODIATRIST

## 2023-10-03 RX ORDER — LEVOFLOXACIN 750 MG/1
750 TABLET, FILM COATED ORAL DAILY
Qty: 14 TABLET | Refills: 0 | Status: SHIPPED | OUTPATIENT
Start: 2023-10-03 | End: 2023-11-17
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
## 2023-10-03 NOTE — NURSING NOTE
Amos Walker's chief complaint for this visit includes:  Chief Complaint   Patient presents with    Left Foot - WOUND CARE    Right Foot - WOUND CARE     PCP: Racheal Swift    Referring Provider:  No referring provider defined for this encounter.    There were no vitals taken for this visit.  Data Unavailable     Do you need any medication refills at today's visit? NO    Allergies   Allergen Reactions    No Clinical Screening - See Comments      methylisothiazolinone    Seasonal Allergies     Simvastatin Other (See Comments)     Sun sensitivty    Methylisothiazolinone Rash    Neomycin      Wound gets worse    Povidone Iodine Rash       Conrado Ashby, EMT

## 2023-10-06 DIAGNOSIS — Z96.649 S/P HIP HEMIARTHROPLASTY: Primary | ICD-10-CM

## 2023-10-12 ENCOUNTER — DOCUMENTATION ONLY (OUTPATIENT)
Dept: INTERNAL MEDICINE | Facility: CLINIC | Age: 78
End: 2023-10-12
Payer: COMMERCIAL

## 2023-10-12 NOTE — PROGRESS NOTES
Type of Form Received: Updated Therapy Plan    Form Received (Date) 10/11/23   Form Filled out Yes, faxed 10/23   Placed in provider folder Yes

## 2023-10-17 ENCOUNTER — TELEPHONE (OUTPATIENT)
Dept: VASCULAR SURGERY | Facility: CLINIC | Age: 78
End: 2023-10-17

## 2023-10-17 NOTE — TELEPHONE ENCOUNTER
The pt cancelled imaging and appointment with Fernando Shipley.  The pt states that he apologizes for the late cancellation. He just learned that he has to  [place] at the airport [place] 10:00 AM. He will call tomorrow to reschedule.  Mehran Joe on 10/17/2023 at 12:29 PM

## 2023-10-18 ENCOUNTER — TELEPHONE (OUTPATIENT)
Dept: INTERNAL MEDICINE | Facility: CLINIC | Age: 78
End: 2023-10-18

## 2023-10-18 NOTE — TELEPHONE ENCOUNTER
Health Call Center    Phone Message    May a detailed message be left on voicemail: yes     Reason for Call: Order(s): Home Care Orders: Other: Ann states this order has been faxed 3 times, it is Physician order [id number] - for discharging order. Patient was discharged 09/14/2023.  Ann is going to fax it again, she does have correct fax number. Please call to discuss.     Action Taken: Message routed to:  [facility] pcc    Travel Screening: Not Applicable

## 2023-10-19 NOTE — TELEPHONE ENCOUNTER
I re printed the form and placed it in Dr. Swift's folder to be signed.    Marta Higgins on 10/19/2023 at 8:34 AM

## 2023-10-20 ENCOUNTER — TRANSFERRED RECORDS (OUTPATIENT)
Dept: HEALTH INFORMATION MANAGEMENT | Facility: CLINIC | Age: 78
End: 2023-10-20

## 2023-10-20 ENCOUNTER — TELEPHONE (OUTPATIENT)
Dept: ORTHOPEDICS | Facility: CLINIC | Age: 78
End: 2023-10-20

## 2023-10-20 ENCOUNTER — MEDICAL CORRESPONDENCE (OUTPATIENT)
Dept: INTERNAL MEDICINE | Facility: CLINIC | Age: 78
End: 2023-10-20

## 2023-10-20 NOTE — TELEPHONE ENCOUNTER
X-ray rescheduled to right before pt's visit with Dr. Keller.         -Clifford, SHANTANU- Orthopedics

## 2023-10-20 NOTE — TELEPHONE ENCOUNTER
[facility] Call Center    Phone Message    May a detailed message be left on voicemail: no     Reason for Call: Had to reschedule his appt today-needs his x-ray rescheduled    Action Taken: Message routed to:  [facility]    Travel Screening: Not Applicable

## 2023-10-25 ENCOUNTER — OFFICE VISIT (OUTPATIENT)
Dept: PODIATRY | Facility: CLINIC | Age: 78
End: 2023-10-25
Payer: COMMERCIAL

## 2023-10-25 DIAGNOSIS — E11.49 TYPE II OR UNSPECIFIED TYPE DIABETES MELLITUS WITH NEUROLOGICAL MANIFESTATIONS, NOT STATED AS UNCONTROLLED(250.60) (H): ICD-10-CM

## 2023-10-25 DIAGNOSIS — E11.610 CHARCOT FOOT DUE TO DIABETES MELLITUS (H): ICD-10-CM

## 2023-10-25 DIAGNOSIS — E11.51 DIABETES MELLITUS WITH PERIPHERAL VASCULAR DISEASE (H): Primary | ICD-10-CM

## 2023-10-25 DIAGNOSIS — S90.425D BLISTER OF TOE OF LEFT FOOT, SUBSEQUENT ENCOUNTER: ICD-10-CM

## 2023-10-25 DIAGNOSIS — I87.8 VENOUS STASIS: ICD-10-CM

## 2023-10-25 PROCEDURE — 99214 OFFICE O/P EST MOD 30 MIN: CPT | Performed by: PODIATRIST
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
## 2023-10-25 NOTE — NURSING NOTE
Amos Walker's chief complaint for this visit includes:  Chief Complaint   Patient presents with    Consult     Bilateral foot ulcer check     PCP: Racheal Swift    Referring Provider:  No referring provider defined for this encounter.    There were no vitals taken for this visit.  Data Unavailable     Do you need any medication refills at today's visit? NO    Allergies   Allergen Reactions    No Clinical Screening - See Comments      methylisothiazolinone    Seasonal Allergies     Simvastatin Other (See Comments)     Sun sensitivty    Methylisothiazolinone Rash    Neomycin      Wound gets worse    Povidone Iodine Rash       Chiquis Morris LPN
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
## 2023-11-06 ENCOUNTER — LAB (OUTPATIENT)
Dept: LAB | Facility: CLINIC | Age: 78
End: 2023-11-06
Payer: COMMERCIAL

## 2023-11-06 DIAGNOSIS — I10 PRIMARY HYPERTENSION: ICD-10-CM

## 2023-11-06 DIAGNOSIS — I73.9 PVD (PERIPHERAL VASCULAR DISEASE) (H): ICD-10-CM

## 2023-11-06 DIAGNOSIS — E11.40 TYPE 2 DIABETES, CONTROLLED, WITH NEUROPATHY (H): ICD-10-CM

## 2023-11-06 LAB — HBA1C MFR BLD: 6.3 % (ref 0–5.6)

## 2023-11-06 PROCEDURE — 36415 COLL VENOUS BLD VENIPUNCTURE: CPT

## 2023-11-06 PROCEDURE — 83036 HEMOGLOBIN GLYCOSYLATED A1C: CPT

## 2023-11-06 PROCEDURE — 82043 UR ALBUMIN QUANTITATIVE: CPT

## 2023-11-06 PROCEDURE — 82570 ASSAY OF URINE CREATININE: CPT

## 2023-11-06 PROCEDURE — 80053 COMPREHEN METABOLIC PANEL: CPT

## 2023-11-06 PROCEDURE — 80061 LIPID PANEL: CPT

## 2023-11-07 ENCOUNTER — OFFICE VISIT (OUTPATIENT)
Dept: PODIATRY | Facility: CLINIC | Age: 78
End: 2023-11-07
Payer: COMMERCIAL

## 2023-11-07 DIAGNOSIS — E11.51 DIABETES MELLITUS WITH PERIPHERAL VASCULAR DISEASE (H): Primary | ICD-10-CM

## 2023-11-07 DIAGNOSIS — E11.49 TYPE II OR UNSPECIFIED TYPE DIABETES MELLITUS WITH NEUROLOGICAL MANIFESTATIONS, NOT STATED AS UNCONTROLLED(250.60) (H): ICD-10-CM

## 2023-11-07 DIAGNOSIS — I87.8 VENOUS STASIS: ICD-10-CM

## 2023-11-07 DIAGNOSIS — S90.425D BLISTER OF TOE OF LEFT FOOT, SUBSEQUENT ENCOUNTER: ICD-10-CM

## 2023-11-07 DIAGNOSIS — E11.610 CHARCOT FOOT DUE TO DIABETES MELLITUS (H): ICD-10-CM

## 2023-11-07 DIAGNOSIS — L97.321 SKIN ULCER OF LEFT ANKLE, LIMITED TO BREAKDOWN OF SKIN (H): ICD-10-CM

## 2023-11-07 LAB
ALBUMIN SERPL BCG-MCNC: 3.9 G/DL (ref 3.5–5.2)
ALP SERPL-CCNC: 108 U/L (ref 40–129)
ALT SERPL W P-5'-P-CCNC: 14 U/L (ref 0–70)
ANION GAP SERPL CALCULATED.3IONS-SCNC: 11 MMOL/L (ref 7–15)
AST SERPL W P-5'-P-CCNC: 30 U/L (ref 0–45)
BILIRUB SERPL-MCNC: 0.5 MG/DL
BUN SERPL-MCNC: 35.7 MG/DL (ref 8–23)
CALCIUM SERPL-MCNC: 9.2 MG/DL (ref 8.8–10.2)
CHLORIDE SERPL-SCNC: 100 MMOL/L (ref 98–107)
CHOLEST SERPL-MCNC: 115 MG/DL
CREAT SERPL-MCNC: 1.25 MG/DL (ref 0.67–1.17)
CREAT UR-MCNC: 141 MG/DL
DEPRECATED HCO3 PLAS-SCNC: 25 MMOL/L (ref 22–29)
EGFRCR SERPLBLD CKD-EPI 2021: 60 ML/MIN/1.73M2
GLUCOSE SERPL-MCNC: 179 MG/DL (ref 70–99)
HDLC SERPL-MCNC: 56 MG/DL
LDLC SERPL CALC-MCNC: 39 MG/DL
MICROALBUMIN UR-MCNC: 28.9 MG/L
MICROALBUMIN/CREAT UR: 20.5 MG/G CR (ref 0–17)
NONHDLC SERPL-MCNC: 59 MG/DL
POTASSIUM SERPL-SCNC: 4.8 MMOL/L (ref 3.4–5.3)
PROT SERPL-MCNC: 7.2 G/DL (ref 6.4–8.3)
SODIUM SERPL-SCNC: 136 MMOL/L (ref 135–145)
TRIGL SERPL-MCNC: 98 MG/DL

## 2023-11-07 PROCEDURE — 99215 OFFICE O/P EST HI 40 MIN: CPT | Performed by: PODIATRIST

## 2023-11-07 RX ORDER — LEVOFLOXACIN 750 MG/1
750 TABLET, FILM COATED ORAL DAILY
Qty: 14 TABLET | Refills: 0 | Status: SHIPPED | OUTPATIENT
Start: 2023-11-07 | End: 2023-12-08

## 2023-11-07 NOTE — NURSING NOTE
Amos Walker's chief complaint for this visit includes:  Chief Complaint   Patient presents with    Consult     Bilateral foot follow up     PCP: Racheal Swift    Referring Provider:  No referring provider defined for this encounter.    There were no vitals taken for this visit.  Data Unavailable     Do you need any medication refills at today's visit? NO    Allergies   Allergen Reactions    No Clinical Screening - See Comments      methylisothiazolinone    Seasonal Allergies     Simvastatin Other (See Comments)     Sun sensitivty    Methylisothiazolinone Rash    Neomycin      Wound gets worse    Povidone Iodine Rash       Chiquis Morris LPN
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
## 2023-11-08 ENCOUNTER — ANCILLARY PROCEDURE (OUTPATIENT)
Dept: GENERAL RADIOLOGY | Facility: CLINIC | Age: 78
End: 2023-11-08
Attending: ORTHOPAEDIC SURGERY
Payer: COMMERCIAL

## 2023-11-08 ENCOUNTER — OFFICE VISIT (OUTPATIENT)
Dept: ORTHOPEDICS | Facility: CLINIC | Age: 78
End: 2023-11-08
Payer: COMMERCIAL

## 2023-11-08 DIAGNOSIS — Z96.649 S/P HIP HEMIARTHROPLASTY: ICD-10-CM

## 2023-11-08 DIAGNOSIS — Z96.649 S/P HIP HEMIARTHROPLASTY: Primary | ICD-10-CM

## 2023-11-08 PROCEDURE — 73502 X-RAY EXAM HIP UNI 2-3 VIEWS: CPT | Mod: RT | Performed by: RADIOLOGY

## 2023-11-08 PROCEDURE — 99213 OFFICE O/P EST LOW 20 MIN: CPT | Mod: GC | Performed by: ORTHOPAEDIC SURGERY

## 2023-11-08 NOTE — PROGRESS NOTES
Orthopedic clinic note    Date of surgery: 6/30/2023  Surgery: Right hip hemiarthroplasty, cemented  Surgeon: Dr. Keller    Subjective: Mr. Walker is a 78-year-old male who presents for 4-month follow-up after the above procedure.  He was last seen at the 2-week follow-up at which time he was making a good recovery at [facility].  He presents today for follow-up.  He is home now from the [facility].  He continues with physical therapy at [facility]. He has been very happy with his therapy and has seen progress.  For example, he had trouble initially with internal rotation of his leg.  This has since resolved.  He is having right groin pain he brings a note from his physical therapist describing the symptoms.  He thinks that this pain started approximately last week after a physical therapy session.  He wonders if he strained a muscle.  From the description, it sounds like a possible iliopsoas strain.  However, he is having difficulty getting in and out of a car and has had continued right groin pain since this incident.  He discontinued his walker a couple of weeks ago.  He is using a cane today.  He has been taking his calcium and vitamin D and is on Fosamax.    He did drive himself here today.    Objective:   No acute distress, appears relatively thin but vivacious  Nonlabored breathing on room air  Musculoskeletal:  Ambulates with a cane.  Gait relatively nonantalgic.  He has groin pain with passive hip range of motion.  He has groin pain with resisted hip flexion.  He has 5 out of 5 strength in the quads, hamstrings.  Neurovascularly intact in the foot.  No pain over the greater trochanter.    Imaging:  Radiographs of the right hip demonstrate a cemented hemiarthroplasty.  There is been no interval change in position of the implants.  There is may be some sclerosis of the acetabulum.  No fractures.  Overall, components appear in good position.    Assessment/plan:  Amos is a 78-year-old male who is 4 months status post  "right hip hemiarthroplasty, cemented.    He is reporting, and his physical therapist is describing, right groin pain that is relatively new.  Etiology seems likely to be an iliopsoas strain versus progressive acetabular arthritis.  We discussed options.  It is possible that there is impingement of the iliopsoas with activation.  We would hope that this would settle down with time.  We gave him instructions on some hip flexor stretches that he can also work on with his therapist.  A corticosteroid injection, image guided, might be of utility in the future.  He would like to manage things conservatively for now.  It is also possible that he has progressive acetabular arthritis.  Because the \"socket\" was not replaced he is at risk for developing wear.  The solution here is a repeat surgery in which we implant a cup and revise the femoral head.  We would not have to revise the stem.  Again, he is interested in conservative management.    We will see him again in a few months virtually.  In the meantime, he will continue his work with his physical therapist.    All questions were answered.    The patient was seen and discussed with Dr. Keller.    Jayleen Aburto, PGY-4  "

## 2023-11-08 NOTE — NURSING NOTE
Reason For Visit:   Chief Complaint   Patient presents with    RECHECK     Follow up right hip // s/p right hip hemiarthroplasty DOS 6/30/23 // been doing PT       There were no vitals taken for this visit.    Pain Assessment  Patient Currently in Pain: Yes  Primary Pain Location: Hip (right hip and groin)      Clifford Tamez, ATC

## 2023-11-09 DIAGNOSIS — I25.83 CORONARY ARTERY DISEASE DUE TO LIPID RICH PLAQUE: ICD-10-CM

## 2023-11-09 DIAGNOSIS — I73.9 PVD (PERIPHERAL VASCULAR DISEASE) (H): ICD-10-CM

## 2023-11-09 DIAGNOSIS — E11.40 TYPE 2 DIABETES, CONTROLLED, WITH NEUROPATHY (H): ICD-10-CM

## 2023-11-09 DIAGNOSIS — I25.10 CORONARY ARTERY DISEASE DUE TO LIPID RICH PLAQUE: ICD-10-CM

## 2023-11-10 RX ORDER — SIMVASTATIN 40 MG
40 TABLET ORAL AT BEDTIME
Qty: 90 TABLET | Refills: 3 | Status: SHIPPED | OUTPATIENT
Start: 2023-11-10 | End: 2024-06-14

## 2023-11-10 NOTE — TELEPHONE ENCOUNTER
simvastatin (ZOCOR) 40 MG tablet 90 tablet 2 12/5/2022  Last Office Visit : 9/11/2023   Future Office visit:  11/13/2023         LDL Cholesterol Calculated   Date Value Ref Range Status   11/06/2023 39 <=100 mg/dL Final   09/02/2020 22 <100 mg/dL Final     Comment:     Desirable:       <100 mg/dl

## 2023-11-13 ENCOUNTER — OFFICE VISIT (OUTPATIENT)
Dept: INTERNAL MEDICINE | Facility: CLINIC | Age: 78
End: 2023-11-13
Payer: COMMERCIAL

## 2023-11-13 VITALS
WEIGHT: 177.6 LBS | HEART RATE: 91 BPM | DIASTOLIC BLOOD PRESSURE: 53 MMHG | SYSTOLIC BLOOD PRESSURE: 110 MMHG | BODY MASS INDEX: 22.8 KG/M2 | OXYGEN SATURATION: 98 %

## 2023-11-13 DIAGNOSIS — N18.2 CKD (CHRONIC KIDNEY DISEASE) STAGE 2, GFR 60-89 ML/MIN: ICD-10-CM

## 2023-11-13 DIAGNOSIS — S72.144A CLOSED NONDISPLACED INTERTROCHANTERIC FRACTURE OF RIGHT FEMUR, INITIAL ENCOUNTER (H): ICD-10-CM

## 2023-11-13 DIAGNOSIS — Z23 NEED FOR INFLUENZA VACCINATION: Primary | ICD-10-CM

## 2023-11-13 DIAGNOSIS — E11.610 CHARCOT FOOT DUE TO DIABETES MELLITUS (H): ICD-10-CM

## 2023-11-13 DIAGNOSIS — Z23 NEED FOR COVID-19 VACCINE: ICD-10-CM

## 2023-11-13 DIAGNOSIS — I73.9 PAD (PERIPHERAL ARTERY DISEASE) (H): ICD-10-CM

## 2023-11-13 DIAGNOSIS — I10 PRIMARY HYPERTENSION: ICD-10-CM

## 2023-11-13 DIAGNOSIS — M80.00XD AGE-RELATED OSTEOPOROSIS WITH CURRENT PATHOLOGICAL FRACTURE WITH ROUTINE HEALING: ICD-10-CM

## 2023-11-13 DIAGNOSIS — Z87.898 HISTORY OF ECCHYMOSIS: ICD-10-CM

## 2023-11-13 DIAGNOSIS — Z00.00 HEALTH CARE MAINTENANCE: ICD-10-CM

## 2023-11-13 DIAGNOSIS — E11.40 TYPE 2 DIABETES, CONTROLLED, WITH NEUROPATHY (H): ICD-10-CM

## 2023-11-13 PROCEDURE — G0008 ADMIN INFLUENZA VIRUS VAC: HCPCS | Performed by: INTERNAL MEDICINE

## 2023-11-13 PROCEDURE — 99214 OFFICE O/P EST MOD 30 MIN: CPT | Mod: 25 | Performed by: INTERNAL MEDICINE

## 2023-11-13 PROCEDURE — 90480 ADMN SARSCOV2 VAC 1/ONLY CMP: CPT | Performed by: INTERNAL MEDICINE

## 2023-11-13 PROCEDURE — 90662 IIV NO PRSV INCREASED AG IM: CPT | Performed by: INTERNAL MEDICINE

## 2023-11-13 PROCEDURE — 91320 SARSCV2 VAC 30MCG TRS-SUC IM: CPT | Performed by: INTERNAL MEDICINE

## 2023-11-13 RX ORDER — RESPIRATORY SYNCYTIAL VIRUS VACCINE 120MCG/0.5
0.5 KIT INTRAMUSCULAR ONCE
Qty: 1 EACH | Refills: 0 | Status: CANCELLED | OUTPATIENT
Start: 2023-11-13 | End: 2023-11-13

## 2023-11-13 NOTE — PROGRESS NOTES
Amos is a 78 year old that presents in clinic today for the following:     Chief Complaint   Patient presents with    Follow Up     Saw a PT, using a cane now for 2 weeks. Tremendous amount of pain in his R leg, large bruise on the back, he didn't fall. Decreased PT activities since then. Surgeon thinks it might be arthritic. Discuss rocker foot, not using an ankle support anymore. Gained 7lbs. Back on antibiotic bc of a blister on his L ankle. Wants a Covid and Flu shot.            11/13/2023    10:30 AM   Additional Questions   Roomed by YW   Accompanied by None       Screenings from encounters over the past 10 days    No data recorded       MORE Corley at 10:36 AM on 11/13/2023

## 2023-11-13 NOTE — NURSING NOTE
Chief Complaint   Patient presents with    Follow Up     Saw a PT, using a cane now for 2 weeks. Tremendous amount of pain in his R leg, large bruise on the back, he didn't fall. Decreased PT activities since then. Surgeon thinks it might be arthritic. Discuss rocker foot, not using an ankle support anymore. Gained 7lbs. Back on antibiotic bc of a blister on his L ankle. Wants a Covid and Flu shot.        Amos Walker received the Fluzone Quadrivalent HD and COVID-19 2023-24 Comirnaty Pfizer Immunizations in clinic today at the request of Dr. Swift.   Prior to injections, verified patient identity using patient's name and date of birth.  The immunization site was cleaned with an alcohol prep wipe.   Patient has no history of reaction to vaccines.    The following medication was given:     MEDICATION:  Fluzone Quadrivalent HD  ROUTE: IM  SITE: Deltoid - Left  DOSE: 0.7 mL  LOT #: [id number]  : [employer]  EXPIRATION DATE: 06/2024  NDC#: 56348-891-43   Was there drug waste? No    The immunization was given without incident--see immunization list for administration details.   No swelling or redness was observed at the site of injection after the immunization was given.  Due to injection administration, patient instructed to remain in clinic for 15 minutes  afterwards, and to report any adverse reaction to me immediately.    Drug Amount Wasted:  None.  Vial/Syringe: Syringe    The following medication was given:     MEDICATION: COVID-19 2023-24 ComirnatQranio Immunization    ROUTE: IM  SITE: Deltoid - Left  DOSE: 0.3 mL  LOT #: [id number]  : Pfizer  EXPIRATION DATE: 1/16/24  NDC#: 2822-6141-13   Was there drug waste? No    The immunization was given without incident--see immunization list for administration details.   No swelling or redness was observed at the site of injection after the immunization was given.  Due to injection administration, patient instructed to remain in clinic for 15  minutes  afterwards, and to report any adverse reaction to me immediately.    Drug Amount Wasted:  None.  Vial/Syringe: Single dose vial    Kayla Varma LPN  November 13, 2023  11:43 AM

## 2023-11-13 NOTE — PROGRESS NOTES
[facility]      Amos Walker is a 78 year old  with PMH of hip fall s/p R hemiarthroplasty 6/30/23, DM with neuropathy and Right Charcot foot, HTN, CAD, PAD,  emphysema, tobacco use, anemia of chronic disease, MGUS , with concerns including:  Chief Complaint   Patient presents with    Follow Up     Saw a PT, using a cane now for 2 weeks. Tremendous amount of pain in his R leg, large bruise on the back, he didn't fall. Decreased PT activities since then. Surgeon thinks it might be arthritic. Discuss rocker foot, not using an ankle support anymore. Gained 7lbs. Back on antibiotic bc of a blister on his L ankle. Wants a Covid and Flu shot.      PCP: Racheal Swift     Patient is here to discuss right leg bruise, weight gain, diabetes management and vaccines.      He has not scheduled the annual CT chest for lung cancer screening yet.  Most recent one was done in July 2022. he is also not planning to stop smoking cigarettes.    He is agreeable to getting the COVID and flu shots today.    He is planning to schedule an appointment with vascular surgery.    Lab: cmp cr 1.25 (baseline 1-1.25), no significant microalbuminuria, lipid profile normal, A1C 6.3%      Objective     /53 (BP Location: Right arm, Patient Position: Sitting, Cuff Size: Adult Regular)   Pulse 91   Wt 80.6 kg (177 lb 9.6 oz)   SpO2 98%   BMI 22.80 kg/m      Physical Exam  Constitutional:       Appearance: Normal appearance.   Cardiovascular:      Rate and Rhythm: Normal rate and regular rhythm.      Pulses: Normal pulses.      Heart sounds: Normal heart sounds.   Pulmonary:      Effort: Pulmonary effort is normal.      Breath sounds: Normal breath sounds.   Musculoskeletal:      Right lower leg: No edema.      Left lower leg: No edema.      Comments: An old bruise seen at the medial thigh spreading to back of right knee, no fluctuation/tenderness upon palpitation    Some small ulcers along the left shin (pt attributed  to accidents of bumping into things)   Neurological:      Mental Status: He is alert.        Assessment & Plan     Amos was seen today for follow up.    Diagnoses and all orders for this visit:    Need for influenza vaccination  -     INFLUENZA VACCINE 65+ (FLUZONE HD)    Need for COVID-19 vaccine  -     COVID-19 12+ (2023-24) (PFIZER)    Type 2 diabetes, controlled, with neuropathy (H)  He is on weekly Trulicity and monitors his blood sugar through freestyle rylie.  He corrects his own blood sugar with lispro 7-8 unit(s)/4-5 unit(s) with lunch and dinner. Upon reviewing the blood sugar, he has a few episodes of low 60s.  He denied any symptoms such as palpitation, dizziness, or lightheadedness with these episodes.  The monitor does not alert him but he regularly checks it throughout the day and catches it.     We discussed stopping lispro and going up on Trulicity however the patient would like to continue with the current regimen as he wants to stay on freestyle rylie which would not be covered if he stops taking lispro.      CKD (chronic kidney disease) stage 2, GFR 60-89 ml/min  He has gained 7 pounds in the last few months.  He denied any fluid collection on his belly or legs, shortness of breath, or orthopnea.  He is able to participate in physical therapy for 1 hour twice a week. Most likely fat/muscle gain. No concerns for heart failure.     -     **Basic metabolic panel FUTURE 14d; Future    Hypertension  Upon seeing his blood pressure of 110/53 today he is concerned if it is too low.  However he denies any chest pain, dizziness, or lightheadedness.     - continue lisinopril at current dose    Age-related osteoporosis with current pathological fracture with routine healing  Closed nondisplaced intertrochanteric fracture of right femur, initial encounter (H)  His right groin pain is steadily improving.  Based on orthopedics notes, it is most likely iliopsoas strain vs. progressive acetabular arthritis. He  takes weekly Fosamax regularly, and no longer need IV Reclast authorization.     On Saturday he noticed a bruise behind his right leg.  He denied any pain or any falls.  Most likely is from physical therapy session where he had to push against his leg for the exercise + being on aspirin and clopidogrel.    - Plan is for conservative management at this time for groin pain and Rt leg bruise.     Charcot foot due to diabetes mellitus (H)  PAD (peripheral artery disease) (H24)    He is seeing podiatry for Charcot foot and recently developed a new left foot ulcer, and is currently taking oral antibiotic with wound care. The ulcer has gotten better.    - continue PT sessions  - continue wound care with podiatry            Follow up in 3 months.      Attending Addendum:  Patient seen and examined with resident in clinic today.  Pertinent portions of history and exam were independently verified by myself.  I agree with the exam and plan as outlined above with the following modifications: none.  Patient was seen and plan of care was discussed with Dr. Racheal Callahan MD   Internal Medicine  [facility]   Clinic phone [phone]

## 2023-11-14 ENCOUNTER — OFFICE VISIT (OUTPATIENT)
Dept: PODIATRY | Facility: CLINIC | Age: 78
End: 2023-11-14
Payer: COMMERCIAL

## 2023-11-14 ENCOUNTER — MYC MEDICAL ADVICE (OUTPATIENT)
Dept: TRANSPLANT | Facility: CLINIC | Age: 78
End: 2023-11-14

## 2023-11-14 DIAGNOSIS — S90.425D BLISTER OF TOE OF LEFT FOOT, SUBSEQUENT ENCOUNTER: ICD-10-CM

## 2023-11-14 DIAGNOSIS — I87.8 VENOUS STASIS: ICD-10-CM

## 2023-11-14 DIAGNOSIS — E11.51 DIABETES MELLITUS WITH PERIPHERAL VASCULAR DISEASE (H): Primary | ICD-10-CM

## 2023-11-14 DIAGNOSIS — E11.610 CHARCOT FOOT DUE TO DIABETES MELLITUS (H): ICD-10-CM

## 2023-11-14 DIAGNOSIS — E11.49 TYPE II OR UNSPECIFIED TYPE DIABETES MELLITUS WITH NEUROLOGICAL MANIFESTATIONS, NOT STATED AS UNCONTROLLED(250.60) (H): ICD-10-CM

## 2023-11-14 PROCEDURE — 99214 OFFICE O/P EST MOD 30 MIN: CPT | Performed by: PODIATRIST
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
## 2023-11-14 NOTE — NURSING NOTE
Amos Walkre's chief complaint for this visit includes:  Chief Complaint   Patient presents with    Consult     Ulcer follow up     PCP: Racheal Swift    Referring Provider:  No referring provider defined for this encounter.    There were no vitals taken for this visit.  Data Unavailable     Do you need any medication refills at today's visit? NO    Allergies   Allergen Reactions    No Clinical Screening - See Comments      methylisothiazolinone    Seasonal Allergies     Simvastatin Other (See Comments)     Sun sensitivty    Methylisothiazolinone Rash    Neomycin      Wound gets worse    Povidone Iodine Rash       Chiquis Morris LPN

## 2023-11-15 DIAGNOSIS — D47.2 MGUS (MONOCLONAL GAMMOPATHY OF UNKNOWN SIGNIFICANCE): Primary | ICD-10-CM

## 2023-11-15 NOTE — TELEPHONE ENCOUNTER
Dylon Ramirez,    Pt has his one year, video visit with you on Nov 29th.  He is requesting lab orders so he can get them drawn.    Can you enter lab orders for pt's return re his chronic anemia and MGUS?    Thank you,    Katy

## 2023-11-17 ENCOUNTER — LAB (OUTPATIENT)
Dept: LAB | Facility: CLINIC | Age: 78
End: 2023-11-17
Payer: COMMERCIAL

## 2023-11-17 ENCOUNTER — VIRTUAL VISIT (OUTPATIENT)
Dept: PHARMACY | Facility: CLINIC | Age: 78
End: 2023-11-17
Payer: COMMERCIAL

## 2023-11-17 DIAGNOSIS — E11.40 TYPE 2 DIABETES, CONTROLLED, WITH NEUROPATHY (H): ICD-10-CM

## 2023-11-17 DIAGNOSIS — D47.2 MGUS (MONOCLONAL GAMMOPATHY OF UNKNOWN SIGNIFICANCE): ICD-10-CM

## 2023-11-17 DIAGNOSIS — M81.0 AGE-RELATED OSTEOPOROSIS WITHOUT CURRENT PATHOLOGICAL FRACTURE: ICD-10-CM

## 2023-11-17 DIAGNOSIS — I10 PRIMARY HYPERTENSION: Primary | ICD-10-CM

## 2023-11-17 LAB
BASOPHILS # BLD AUTO: 0 10E3/UL (ref 0–0.2)
BASOPHILS NFR BLD AUTO: 1 %
EOSINOPHIL # BLD AUTO: 0.1 10E3/UL (ref 0–0.7)
EOSINOPHIL NFR BLD AUTO: 3 %
ERYTHROCYTE [DISTWIDTH] IN BLOOD BY AUTOMATED COUNT: 13 % (ref 10–15)
HCT VFR BLD AUTO: 33.3 % (ref 40–53)
HGB BLD-MCNC: 10.9 G/DL (ref 13.3–17.7)
IMM GRANULOCYTES # BLD: 0 10E3/UL
IMM GRANULOCYTES NFR BLD: 0 %
LYMPHOCYTES # BLD AUTO: 1.3 10E3/UL (ref 0.8–5.3)
LYMPHOCYTES NFR BLD AUTO: 26 %
MCH RBC QN AUTO: 31.5 PG (ref 26.5–33)
MCHC RBC AUTO-ENTMCNC: 32.7 G/DL (ref 31.5–36.5)
MCV RBC AUTO: 96 FL (ref 78–100)
MONOCYTES # BLD AUTO: 0.5 10E3/UL (ref 0–1.3)
MONOCYTES NFR BLD AUTO: 9 %
NEUTROPHILS # BLD AUTO: 3 10E3/UL (ref 1.6–8.3)
NEUTROPHILS NFR BLD AUTO: 61 %
PLATELET # BLD AUTO: 225 10E3/UL (ref 150–450)
RBC # BLD AUTO: 3.46 10E6/UL (ref 4.4–5.9)
WBC # BLD AUTO: 5 10E3/UL (ref 4–11)

## 2023-11-17 PROCEDURE — 99207 PR NO CHARGE LOS: CPT | Performed by: PHARMACIST

## 2023-11-17 PROCEDURE — 84165 PROTEIN E-PHORESIS SERUM: CPT | Performed by: PATHOLOGY

## 2023-11-17 PROCEDURE — 83521 IG LIGHT CHAINS FREE EACH: CPT

## 2023-11-17 PROCEDURE — 36415 COLL VENOUS BLD VENIPUNCTURE: CPT

## 2023-11-17 PROCEDURE — 85025 COMPLETE CBC W/AUTO DIFF WBC: CPT

## 2023-11-17 PROCEDURE — 80053 COMPREHEN METABOLIC PANEL: CPT

## 2023-11-17 PROCEDURE — 83615 LACTATE (LD) (LDH) ENZYME: CPT

## 2023-11-17 PROCEDURE — 82784 ASSAY IGA/IGD/IGG/IGM EACH: CPT

## 2023-11-17 PROCEDURE — 82784 ASSAY IGA/IGD/IGG/IGM EACH: CPT | Mod: 59

## 2023-11-17 PROCEDURE — 84550 ASSAY OF BLOOD/URIC ACID: CPT

## 2023-11-17 PROCEDURE — 84155 ASSAY OF PROTEIN SERUM: CPT

## 2023-11-17 NOTE — PROGRESS NOTES
Medication Therapy Management (MTM) Encounter    ASSESSMENT:                            Medication Adherence/Access: No issues identified    Type 2 Diabetes: Patient is meeting A1c goal of <7%. Discussed increasing Trulicity dose to 3 mg weekly and discontinuing insulin use - patient declined again do to wanting to keep coverage for Danny.     Hypertension: Controlled - According to 2017 ACC/AHA guidelines, patients greater than or equal to 65 years of age should aim for a blood pressure goal of < 130/80 mmHg. Patient has been inconsistently taking their lisinopril for blood pressure. Discussed continuing taking the lisinopril consistently for blood pressure and kidney protection benefits.     Osteoporosis: Controlled    PLAN:                            Continue all medications as instructed    Follow-up: Patient to follow-up in 3 months via phone     SUBJECTIVE/OBJECTIVE:                          Amos Walker is a 78 year old male called for a follow-up visit from 08/17.       Reason for visit: Medication Follow-Up.    Allergies/ADRs: Reviewed in chart  Past Medical History: Reviewed in chart  Tobacco: He reports that he has been smoking cigarettes. He has a 12.50 pack-year smoking history. He has never used smokeless tobacco. States he does not want to quit.    Alcohol: none    Medication Adherence/Access: no issues reported    Diabetes   Trulicity 1.5 mg weekly  Humalog 3-4 units TID with meals (patient reports that he takes 8 units with breakfast, 3-4 units with lunch, and 4 units with dinner), will take 6 units when doing PT instead  Glucose 4 mg as needed     Last Visit: Discussed increasing the Trulicity dose to 3 mg weekly and discontinuing insulin use and patient declined. Patient is more comfortable with using insulin than other forms of BG management. This can be revisited in the future as the patient wanted time to think about this change.      Patient is not experiencing side effects.  Blood sugar  monitoring: Continuous Glucose Monitor     Current diabetes symptoms: none  Diet/Exercise: not assessed  Eye exam is up to date  Foot exam is up to date  Aspirin: Yes - 81 mg every other day (for PAD)  Statin: Yes- Simvastatin 40 mg daily     Urine Albumin:   Lab Results   Component Value Date    UMALCR 20.50 (H) 11/06/2023      Lab Results   Component Value Date    A1C 6.3 (H) 11/06/2023     Hypertension   Lisinopril 2.5 mg daily    Patient has stopped taking lisinopril because blood pressure readings have been controlled without it. Informed we would like him to continue lisinopril for kidney protection    Last Visit: Patient was not using lisinopril daily and only as needed if his blood pressure was elevated since it caused him to be hypotensive.   Decreased dose from 5 mg to 2.5 mg daily.    Patient reports no current medication side effects  Patient self-monitors blood pressure daily.  Today's reading:      BP Readings from Last 3 Encounters:   11/13/23 110/53   09/11/23 125/69   07/10/23 (!) 148/65     Pulse Readings from Last 3 Encounters:   11/13/23 91   09/11/23 85   07/10/23 79     Osteoporosis  alendronate (Fosamax) 70mg weekly (has been on current therapy), has not missed doses  Patient is not experiencing side effects.    Last visit: Patient reports missing this dose sometimes. Was not interested in taking the 10 mg daily version as he thought this would disrupt his current morning regimen.        Today's Vitals: There were no vitals taken for this visit.  ----------------    I spent 17 minutes with this patient today. All changes were made via collaborative practice agreement with Racheal Swift MD. A copy of the visit note was provided to the patient's provider(s).    A summary of these recommendations was sent via [employer].    Jose Rose, 4th Year Student Pharmacist, [facility]     Preceptor Cosignature: I have reviewed and verified the student's documentation.    Jose Silver, Pharm. D.,  BCACP  Medication Therapy Management Pharmacist  Direct Voicemail: [phone]      Telemedicine Visit Details  Type of service:  Telephone visit  Start Time:  11:00 AM  End Time: 11:17 AM     Medication Therapy Recommendations  No medication therapy recommendations to display

## 2023-11-20 LAB
ALBUMIN SERPL BCG-MCNC: 3.8 G/DL (ref 3.5–5.2)
ALP SERPL-CCNC: 93 U/L (ref 40–150)
ALT SERPL W P-5'-P-CCNC: 10 U/L (ref 0–70)
ANION GAP SERPL CALCULATED.3IONS-SCNC: 11 MMOL/L (ref 7–15)
AST SERPL W P-5'-P-CCNC: 23 U/L (ref 0–45)
BILIRUB SERPL-MCNC: 0.4 MG/DL
BUN SERPL-MCNC: 33.8 MG/DL (ref 8–23)
CALCIUM SERPL-MCNC: 9.5 MG/DL (ref 8.8–10.2)
CHLORIDE SERPL-SCNC: 101 MMOL/L (ref 98–107)
CREAT SERPL-MCNC: 1.29 MG/DL (ref 0.67–1.17)
DEPRECATED HCO3 PLAS-SCNC: 26 MMOL/L (ref 22–29)
EGFRCR SERPLBLD CKD-EPI 2021: 57 ML/MIN/1.73M2
GLUCOSE SERPL-MCNC: 184 MG/DL (ref 70–99)
LDH SERPL L TO P-CCNC: 171 U/L (ref 0–250)
POTASSIUM SERPL-SCNC: 4.9 MMOL/L (ref 3.4–5.3)
PROT SERPL-MCNC: 6.9 G/DL (ref 6.4–8.3)
SODIUM SERPL-SCNC: 138 MMOL/L (ref 135–145)
TOTAL PROTEIN SERUM FOR ELP: 6.6 G/DL (ref 6.4–8.3)
URATE SERPL-MCNC: 6.6 MG/DL (ref 3.4–7)

## 2023-11-21 LAB
ALBUMIN SERPL ELPH-MCNC: 3.6 G/DL (ref 3.7–5.1)
ALPHA1 GLOB SERPL ELPH-MCNC: 0.3 G/DL (ref 0.2–0.4)
ALPHA2 GLOB SERPL ELPH-MCNC: 0.8 G/DL (ref 0.5–0.9)
B-GLOBULIN SERPL ELPH-MCNC: 0.8 G/DL (ref 0.6–1)
GAMMA GLOB SERPL ELPH-MCNC: 1.1 G/DL (ref 0.7–1.6)
IGA SERPL-MCNC: 309 MG/DL (ref 84–499)
IGG SERPL-MCNC: 1210 MG/DL (ref 610–1616)
IGM SERPL-MCNC: 20 MG/DL (ref 35–242)
KAPPA LC FREE SER-MCNC: 6.37 MG/DL (ref 0.33–1.94)
KAPPA LC FREE/LAMBDA FREE SER NEPH: 2.39 {RATIO} (ref 0.26–1.65)
LAMBDA LC FREE SERPL-MCNC: 2.67 MG/DL (ref 0.57–2.63)
M PROTEIN SERPL ELPH-MCNC: 0.1 G/DL
PROT PATTERN SERPL ELPH-IMP: ABNORMAL

## 2023-11-21 NOTE — PATIENT INSTRUCTIONS
"Recommendations from today's MTM visit:                                                    MTM (medication therapy management) is a service provided by a clinical pharmacist designed to help you get the most of out of your medicines.   Today we reviewed what your medicines are for, how to know if they are working, that your medicines are safe and how to make your medicine regimen as easy as possible.    Continue all medications as instructed    Follow-up: Return in about 3 months (around 2/17/2024) for Follow up, with me.    It was great speaking with you today.  I value your experience and would be very thankful for your time in providing feedback in our clinic survey. In the next few days, you may receive an email or text message from [employer] with a link to a survey related to your  clinical pharmacist.\"     To schedule another MTM appointment, please call the clinic directly or you may call the MTM scheduling line at [phone] or toll-free at [phone].     My Clinical Pharmacist's contact information:                                                      Please feel free to contact me with any questions or concerns you have.      Jose Silver, Pharm. D., [facility]  Medication Therapy Management Pharmacist    "

## 2023-11-24 ENCOUNTER — DOCUMENTATION ONLY (OUTPATIENT)
Dept: INTERNAL MEDICINE | Facility: CLINIC | Age: 78
End: 2023-11-24
Payer: COMMERCIAL

## 2023-11-28 ENCOUNTER — OFFICE VISIT (OUTPATIENT)
Dept: PODIATRY | Facility: CLINIC | Age: 78
End: 2023-11-28
Payer: COMMERCIAL

## 2023-11-28 ENCOUNTER — VIRTUAL VISIT (OUTPATIENT)
Dept: TRANSPLANT | Facility: CLINIC | Age: 78
End: 2023-11-28
Attending: INTERNAL MEDICINE
Payer: COMMERCIAL

## 2023-11-28 VITALS — BODY MASS INDEX: 22.01 KG/M2 | HEIGHT: 75 IN | WEIGHT: 177 LBS

## 2023-11-28 DIAGNOSIS — L97.521 SKIN ULCER OF LEFT FOOT, LIMITED TO BREAKDOWN OF SKIN (H): ICD-10-CM

## 2023-11-28 DIAGNOSIS — S80.821S: ICD-10-CM

## 2023-11-28 DIAGNOSIS — D47.2 MGUS (MONOCLONAL GAMMOPATHY OF UNKNOWN SIGNIFICANCE): Primary | ICD-10-CM

## 2023-11-28 DIAGNOSIS — E11.610 CHARCOT FOOT DUE TO DIABETES MELLITUS (H): ICD-10-CM

## 2023-11-28 DIAGNOSIS — E11.51 DIABETES MELLITUS WITH PERIPHERAL VASCULAR DISEASE (H): Primary | ICD-10-CM

## 2023-11-28 DIAGNOSIS — I87.8 VENOUS STASIS: ICD-10-CM

## 2023-11-28 DIAGNOSIS — I73.9 PVD (PERIPHERAL VASCULAR DISEASE) (H): ICD-10-CM

## 2023-11-28 DIAGNOSIS — E11.49 TYPE II OR UNSPECIFIED TYPE DIABETES MELLITUS WITH NEUROLOGICAL MANIFESTATIONS, NOT STATED AS UNCONTROLLED(250.60) (H): ICD-10-CM

## 2023-11-28 DIAGNOSIS — L97.922 SKIN ULCER OF LEFT LOWER LEG WITH FAT LAYER EXPOSED (H): ICD-10-CM

## 2023-11-28 LAB
BASOPHILS # BLD AUTO: 0.1 10E3/UL (ref 0–0.2)
BASOPHILS NFR BLD AUTO: 1 %
CRP SERPL-MCNC: <3 MG/L
EOSINOPHIL # BLD AUTO: 0.2 10E3/UL (ref 0–0.7)
EOSINOPHIL NFR BLD AUTO: 3 %
ERYTHROCYTE [DISTWIDTH] IN BLOOD BY AUTOMATED COUNT: 13.2 % (ref 10–15)
ERYTHROCYTE [SEDIMENTATION RATE] IN BLOOD BY WESTERGREN METHOD: 9 MM/HR (ref 0–20)
HCT VFR BLD AUTO: 38.1 % (ref 40–53)
HGB BLD-MCNC: 12.3 G/DL (ref 13.3–17.7)
IMM GRANULOCYTES # BLD: 0.1 10E3/UL
IMM GRANULOCYTES NFR BLD: 1 %
LYMPHOCYTES # BLD AUTO: 1.2 10E3/UL (ref 0.8–5.3)
LYMPHOCYTES NFR BLD AUTO: 20 %
MCH RBC QN AUTO: 31.1 PG (ref 26.5–33)
MCHC RBC AUTO-ENTMCNC: 32.3 G/DL (ref 31.5–36.5)
MCV RBC AUTO: 97 FL (ref 78–100)
MONOCYTES # BLD AUTO: 0.6 10E3/UL (ref 0–1.3)
MONOCYTES NFR BLD AUTO: 11 %
NEUTROPHILS # BLD AUTO: 3.9 10E3/UL (ref 1.6–8.3)
NEUTROPHILS NFR BLD AUTO: 64 %
NRBC # BLD AUTO: 0 10E3/UL
NRBC BLD AUTO-RTO: 0 /100
PLATELET # BLD AUTO: 230 10E3/UL (ref 150–450)
RBC # BLD AUTO: 3.95 10E6/UL (ref 4.4–5.9)
WBC # BLD AUTO: 6 10E3/UL (ref 4–11)

## 2023-11-28 PROCEDURE — 85652 RBC SED RATE AUTOMATED: CPT | Performed by: PODIATRIST

## 2023-11-28 PROCEDURE — 99213 OFFICE O/P EST LOW 20 MIN: CPT | Mod: 95 | Performed by: INTERNAL MEDICINE

## 2023-11-28 PROCEDURE — 99215 OFFICE O/P EST HI 40 MIN: CPT | Performed by: PODIATRIST

## 2023-11-28 PROCEDURE — 86140 C-REACTIVE PROTEIN: CPT | Performed by: PODIATRIST

## 2023-11-28 PROCEDURE — 85025 COMPLETE CBC W/AUTO DIFF WBC: CPT | Performed by: PODIATRIST

## 2023-11-28 PROCEDURE — 36415 COLL VENOUS BLD VENIPUNCTURE: CPT | Performed by: PODIATRIST

## 2023-11-28 RX ORDER — LEVOFLOXACIN 750 MG/1
750 TABLET, FILM COATED ORAL DAILY
Qty: 14 TABLET | Refills: 0 | Status: SHIPPED | OUTPATIENT
Start: 2023-11-28

## 2023-11-28 ASSESSMENT — PAIN SCALES - GENERAL: PAINLEVEL: NO PAIN (1)

## 2023-11-28 NOTE — PROGRESS NOTES
Type of Form Received:     Form Received (Date) 11/24/23   Form Filled out Yes, faxed 12/5   Placed in provider folder Yes

## 2023-11-28 NOTE — NURSING NOTE
Is the patient currently in the state of [place]? YES    Visit mode:VIDEO    If the visit is dropped, the patient can be reconnected by: VIDEO VISIT: Text to cell phone:   Telephone Information:   Mobile [phone]       Will anyone else be joining the visit? NO  (If patient encounters technical issues they should call [phone]    How would you like to obtain your AVS? MyChart    Are changes needed to the allergy or medication list? No    Reason for visit: RECHECK    Bobbilynn Grossaint VVF

## 2023-11-28 NOTE — PROGRESS NOTES
Amos is a 78 year old who is being evaluated via a billable video visit.  Currently in [facility].    How would you like to obtain your AVS? MyChart  If the video visit is dropped, the invitation should be resent by: Text to cell phone: [phone]  Will anyone else be joining your video visit? Yesenia Chinchilla      HISTORY OF PRESENT ILLNESS:   Mr. Walker is a 76-year-old gentleman referred for evaluation of anemia and monoclonal gammopathy.  He has a complicated medical history highlighted by type 2 diabetes, hypertension, coronary and peripheral artery disease, Charcot foot, chronic leg ulcers, chronic kidney disease and history of heavy tobacco use.  He has been noted to be anemic for quite a while.  He tells me that he was anemic several years ago when he was in [place].  He had some blood in the stool.  He did have colonoscopies done.  In reviewing his chart, he has had hemoglobins in the low 9/high 8 range on and off since 2017.  Occasionally his hemoglobin gets a little bit higher.  His most recent hemoglobin on 02/05 was 9.0.  He denies any active bleeding now.  He does have foot ulcers.  He has had a lot of problems with infections.  He has had a history of MRSA involving his foot which he feels led ultimately to his Charcot foot and his diabetic ulcers.  He has not had a history of B12 deficiency.  He was noted in 2019 to have a monoclonal peak of 0.2.  There were no light chains in the urine.  His immunoglobulin levels were elevated in IgA and IgM.  His IgA was 604 and IgG was 1890.  There is an immunofixation of the monoclonal peak, which showed a free light chain and kappa chain type in his serum and an IgG kappa also in the serum.  He had a rather severe infection in 01/2020 with sepsis, acute metabolic encephalopathy, respiratory failure secondary to Legionella community-acquired pneumonia.  He was treated aggressively with antibiotics and ultimately was discharged.  He has had issues with  type 2 diabetes, on Lantus and Humalog.  He also has had hypertension.  He sees Dr. Mcclain regularly for his right foot ulcer about every 2 weeks.  He has a Charcot foot.  He denies any fever or chills.  He denies any nausea, vomiting or diarrhea.     INTERVAL HISTORY  Still has issues with ulceration of left ankle followed by  Dr Mcclain in Podiatry, in fact saw him today> He has more swelling up the shin and given a levofloxicin rx and will see him again on Thursday, He put hydrofera Blue and gentamicin on skin of shin He did get a new hearing aid which has been a remarkable increase in QOL He had COVID infection in April 2022. He had flu and COVID boosters this fall. Has not had RSV.Still has trouble walking due to right Charcot foot. He broke right hip in June and had partial replacement  He has some dyspnea on exertion and is deconditioned He is going to physical therapy      PAST MEDICAL HISTORY:  Remarkable for coronary artery disease, chronic kidney disease, emphysema, heart disease, hyperlipidemia, peripheral artery disease, type 2 diabetes, venous ulcerations, chronic kidney disease, hypertension, atherosclerosis of native arteries of right leg, Charcot foot due to diabetes, angioplasty, a left total hip replacement, a femoral endarterectomy on the right, a cervical disk fusion, a right foot orthopedic surgery, and bilateral cataract surgery.      FAMILY HISTORY:  Remarkable for his father having colon cancer and kidney disease.  Mother had kidney and cardiac issues.  His son had a myocardial infarction at age 40.  His brother has macular degeneration.      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  He smokes 2 packs per day for at least 25 years.  No alcohol use.  He is .  He has 4 children.      MEDICATIONS:  See EMR.      REVIEW OF SYSTEMS:   12 pt ROS done and is negative  Except as in HPI     ALLERGIES:  Lisinopril, neomycin, methylchloroisothiazolinone, povidone-iodine.         PHYSICAL EXAMINATION:  PHYSICAL EXAMINATION:  By the video, he is an alert gentleman, verbal, in no acute distress.     EYES:  Grossly normal to inspection, no discharge, erythema or icterus.   SKIN:  Visible skin is clear.  No significant rash or abnormal pigmentation.   NEUROLOGIC:  Cranial nerves seem to be intact.  Mentation and speech is appropriate for age.   PSYCHIATRIC:  Mentation appears normal.  He does not seem anxious today.            1.  Chronic anemia, likely anemia of chronic inflammation.   2.  Monoclonal gammopathy of uncertain significance.   3.  Type 2 diabetes.   4.  Coronary artery disease.   5.  Peripheral artery disease.   6.   Right Charcot foot.  7.   Smoking   8.  Cellulitis   9. Right hip fx s/p partial replacement  10. Left hip Total hip replcaed    . His hemoglobin is increased to 12.8  He has significant inflammation in his lower legs requiring antibiotic . I explained the MGUS is common in individuals over 70. May be related to his chronic inflammation but no evidence for multiple myeloma , no CRAB criteria really met.Kappa chains and M spike are the same as last year. Will repeat labs in 12 mo and see him then   RTC 1 year  With labs  I spent a total of 20 minutes on the video and reviewing the charg with Amos Walker during today's office visit. Over 50% of this time was spent counseling the patient and coordinating the care regarding anemia and MGUS  Kaleb Ramirez MD        Latest Reference Range & Units 11/17/23 16:51 11/28/23 11:02   Sodium 135 - 145 mmol/L 138    Potassium 3.4 - 5.3 mmol/L 4.9    Chloride 98 - 107 mmol/L 101    Carbon Dioxide (CO2) 22 - 29 mmol/L 26    Urea Nitrogen 8.0 - 23.0 mg/dL 33.8 (H)    Creatinine 0.67 - 1.17 mg/dL 1.29 (H)    GFR Estimate >60 mL/min/1.73m2 57 (L)    Calcium 8.8 - 10.2 mg/dL 9.5    Anion Gap 7 - 15 mmol/L 11    Albumin 3.5 - 5.2 g/dL 3.8    Protein Total 6.4 - 8.3 g/dL 6.9    Alkaline Phosphatase 40 - 150 U/L 93    ALT 0 -  70 U/L 10    AST 0 - 45 U/L 23    Bilirubin Total <=1.2 mg/dL 0.4    CRP Inflammation <5.00 mg/L  <3.00   Glucose 70 - 99 mg/dL 184 (H)    Lactate Dehydrogenase 0 - 250 U/L 171    Uric Acid 3.4 - 7.0 mg/dL 6.6    WBC 4.0 - 11.0 10e3/uL 5.0 6.0   Hemoglobin 13.3 - 17.7 g/dL 10.9 (L) 12.3 (L)   Hematocrit 40.0 - 53.0 % 33.3 (L) 38.1 (L)   Platelet Count 150 - 450 10e3/uL 225 230   RBC Count 4.40 - 5.90 10e6/uL 3.46 (L) 3.95 (L)   MCV 78 - 100 fL 96 97   MCH 26.5 - 33.0 pg 31.5 31.1   MCHC 31.5 - 36.5 g/dL 32.7 32.3   RDW 10.0 - 15.0 % 13.0 13.2   % Neutrophils % 61 64   % Lymphocytes % 26 20   % Monocytes % 9 11   % Eosinophils % 3 3   % Basophils % 1 1   Absolute Basophils 0.0 - 0.2 10e3/uL 0.0 0.1   Absolute Eosinophils 0.0 - 0.7 10e3/uL 0.1 0.2   Absolute Immature Granulocytes <=0.4 10e3/uL 0.0 0.1   Absolute Lymphocytes 0.8 - 5.3 10e3/uL 1.3 1.2   Absolute Monocytes 0.0 - 1.3 10e3/uL 0.5 0.6   % Immature Granulocytes % 0 1   Absolute Neutrophils 1.6 - 8.3 10e3/uL 3.0 3.9   Absolute NRBCs 10e3/uL  0.0   NRBCs per 100 WBC <1 /100  0   Sed Rate 0 - 20 mm/hr  9   Albumin Fraction 3.7 - 5.1 g/dL 3.6 (L)    Alpha 1 Fraction 0.2 - 0.4 g/dL 0.3    Alpha 2 Fraction 0.5 - 0.9 g/dL 0.8    Beta Fraction 0.6 - 1.0 g/dL 0.8    ELP Interpretation:  Possible very small monoclonal protein (0.1 g/dL) seen in the gamma fraction which could be either the IgG kappa or the free kappa light chain monoclonal there were previously characterized in our laboratory on 7/10/2019. Consider a serum immunofixation for confirmation if clinically indicated. Hypoalbuminemia. Pathologic significance requires clinical correlation. Hayden Turner M.D., Ph.D., Pathologist.    Gamma Fraction 0.7 - 1.6 g/dL 1.1    IGA 84 - 499 mg/dL 309     - 1,616 mg/dL 1,210    IGM 35 - 242 mg/dL 20 (L)    Tignall Free Lt Chain 0.33 - 1.94 mg/dL 6.37 (H)    Kappa Lambda Ratio 0.26 - 1.65  2.39 (H)    Lambda Free Lt Chain 0.57 - 2.63 mg/dL 2.67 (H)     Monoclonal Peak <=0.0 g/dL 0.1 (H)    Total Protein Serum for ELP 6.4 - 8.3 g/dL 6.6    (H): Data is abnormally high  (L): Data is abnormally low
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
## 2023-11-28 NOTE — LETTER
11/28/2023         RE: Amos Walker  [address] [facility] [address]        Dear Colleague,    Thank you for referring your patient, Amos Walker, to the [facility] [place]. Please see a copy of my visit note below.    Amos is a 78 year old who is being evaluated via a billable video visit.  Currently in [facility].    How would you like to obtain your AVS? MyChart  If the video visit is dropped, the invitation should be resent by: Text to cell phone: [phone]  Will anyone else be joining your video visit? Yesenia Chinchilla      HISTORY OF PRESENT ILLNESS:   Mr. Walker is a 76-year-old gentleman referred for evaluation of anemia and monoclonal gammopathy.  He has a complicated medical history highlighted by type 2 diabetes, hypertension, coronary and peripheral artery disease, Charcot foot, chronic leg ulcers, chronic kidney disease and history of heavy tobacco use.  He has been noted to be anemic for quite a while.  He tells me that he was anemic several years ago when he was in [place].  He had some blood in the stool.  He did have colonoscopies done.  In reviewing his chart, he has had hemoglobins in the low 9/high 8 range on and off since 2017.  Occasionally his hemoglobin gets a little bit higher.  His most recent hemoglobin on 02/05 was 9.0.  He denies any active bleeding now.  He does have foot ulcers.  He has had a lot of problems with infections.  He has had a history of MRSA involving his foot which he feels led ultimately to his Charcot foot and his diabetic ulcers.  He has not had a history of B12 deficiency.  He was noted in 2019 to have a monoclonal peak of 0.2.  There were no light chains in the urine.  His immunoglobulin levels were elevated in IgA and IgM.  His IgA was 604 and IgG was 1890.  There is an immunofixation of the monoclonal peak, which showed a free light chain and kappa chain type in his serum and an IgG kappa  also in the serum.  He had a rather severe infection in 01/2020 with sepsis, acute metabolic encephalopathy, respiratory failure secondary to Legionella community-acquired pneumonia.  He was treated aggressively with antibiotics and ultimately was discharged.  He has had issues with type 2 diabetes, on Lantus and Humalog.  He also has had hypertension.  He sees Dr. Mcclain regularly for his right foot ulcer about every 2 weeks.  He has a Charcot foot.  He denies any fever or chills.  He denies any nausea, vomiting or diarrhea.     INTERVAL HISTORY  Still has issues with ulceration of left ankle followed by  Dr Mcclain in Podiatry, in fact saw him today> He has more swelling up the shin and given a levofloxicin rx and will see him again on Thursday, He put hydrofera Blue and gentamicin on skin of shin He did get a new hearing aid which has been a remarkable increase in QOL He had COVID infection in April 2022. He had flu and COVID boosters this fall. Has not had RSV.Still has trouble walking due to right Charcot foot. He broke right hip in June and had partial replacement  He has some dyspnea on exertion and is deconditioned He is going to physical therapy      PAST MEDICAL HISTORY:  Remarkable for coronary artery disease, chronic kidney disease, emphysema, heart disease, hyperlipidemia, peripheral artery disease, type 2 diabetes, venous ulcerations, chronic kidney disease, hypertension, atherosclerosis of native arteries of right leg, Charcot foot due to diabetes, angioplasty, a left total hip replacement, a femoral endarterectomy on the right, a cervical disk fusion, a right foot orthopedic surgery, and bilateral cataract surgery.      FAMILY HISTORY:  Remarkable for his father having colon cancer and kidney disease.  Mother had kidney and cardiac issues.  His son had a myocardial infarction at age 40.  His brother has macular degeneration.      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  He  smokes 2 packs per day for at least 25 years.  No alcohol use.  He is .  He has 4 children.      MEDICATIONS:  See EMR.      REVIEW OF SYSTEMS:   12 pt ROS done and is negative  Except as in HPI     ALLERGIES:  Lisinopril, neomycin, methylchloroisothiazolinone, povidone-iodine.        PHYSICAL EXAMINATION:  PHYSICAL EXAMINATION:  By the video, he is an alert gentleman, verbal, in no acute distress.     EYES:  Grossly normal to inspection, no discharge, erythema or icterus.   SKIN:  Visible skin is clear.  No significant rash or abnormal pigmentation.   NEUROLOGIC:  Cranial nerves seem to be intact.  Mentation and speech is appropriate for age.   PSYCHIATRIC:  Mentation appears normal.  He does not seem anxious today.            1.  Chronic anemia, likely anemia of chronic inflammation.   2.  Monoclonal gammopathy of uncertain significance.   3.  Type 2 diabetes.   4.  Coronary artery disease.   5.  Peripheral artery disease.   6.   Right Charcot foot.  7.   Smoking   8.  Cellulitis   9. Right hip fx s/p partial replacement  10. Left hip Total hip replcaed    . His hemoglobin is increased to 12.8  He has significant inflammation in his lower legs requiring antibiotic . I explained the MGUS is common in individuals over 70. May be related to his chronic inflammation but no evidence for multiple myeloma , no CRAB criteria really met.Kappa chains and M spike are the same as last year. Will repeat labs in 12 mo and see him then   RTC 1 year  With labs  I spent a total of 20 minutes on the video and reviewing the charg with Amos Walker during today's office visit. Over 50% of this time was spent counseling the patient and coordinating the care regarding anemia and MGUS  Kaleb Ramirez MD        Latest Reference Range & Units 11/17/23 16:51 11/28/23 11:02   Sodium 135 - 145 mmol/L 138    Potassium 3.4 - 5.3 mmol/L 4.9    Chloride 98 - 107 mmol/L 101    Carbon Dioxide (CO2) 22 - 29 mmol/L 26    Urea  Nitrogen 8.0 - 23.0 mg/dL 33.8 (H)    Creatinine 0.67 - 1.17 mg/dL 1.29 (H)    GFR Estimate >60 mL/min/1.73m2 57 (L)    Calcium 8.8 - 10.2 mg/dL 9.5    Anion Gap 7 - 15 mmol/L 11    Albumin 3.5 - 5.2 g/dL 3.8    Protein Total 6.4 - 8.3 g/dL 6.9    Alkaline Phosphatase 40 - 150 U/L 93    ALT 0 - 70 U/L 10    AST 0 - 45 U/L 23    Bilirubin Total <=1.2 mg/dL 0.4    CRP Inflammation <5.00 mg/L  <3.00   Glucose 70 - 99 mg/dL 184 (H)    Lactate Dehydrogenase 0 - 250 U/L 171    Uric Acid 3.4 - 7.0 mg/dL 6.6    WBC 4.0 - 11.0 10e3/uL 5.0 6.0   Hemoglobin 13.3 - 17.7 g/dL 10.9 (L) 12.3 (L)   Hematocrit 40.0 - 53.0 % 33.3 (L) 38.1 (L)   Platelet Count 150 - 450 10e3/uL 225 230   RBC Count 4.40 - 5.90 10e6/uL 3.46 (L) 3.95 (L)   MCV 78 - 100 fL 96 97   MCH 26.5 - 33.0 pg 31.5 31.1   MCHC 31.5 - 36.5 g/dL 32.7 32.3   RDW 10.0 - 15.0 % 13.0 13.2   % Neutrophils % 61 64   % Lymphocytes % 26 20   % Monocytes % 9 11   % Eosinophils % 3 3   % Basophils % 1 1   Absolute Basophils 0.0 - 0.2 10e3/uL 0.0 0.1   Absolute Eosinophils 0.0 - 0.7 10e3/uL 0.1 0.2   Absolute Immature Granulocytes <=0.4 10e3/uL 0.0 0.1   Absolute Lymphocytes 0.8 - 5.3 10e3/uL 1.3 1.2   Absolute Monocytes 0.0 - 1.3 10e3/uL 0.5 0.6   % Immature Granulocytes % 0 1   Absolute Neutrophils 1.6 - 8.3 10e3/uL 3.0 3.9   Absolute NRBCs 10e3/uL  0.0   NRBCs per 100 WBC <1 /100  0   Sed Rate 0 - 20 mm/hr  9   Albumin Fraction 3.7 - 5.1 g/dL 3.6 (L)    Alpha 1 Fraction 0.2 - 0.4 g/dL 0.3    Alpha 2 Fraction 0.5 - 0.9 g/dL 0.8    Beta Fraction 0.6 - 1.0 g/dL 0.8    ELP Interpretation:  Possible very small monoclonal protein (0.1 g/dL) seen in the gamma fraction which could be either the IgG kappa or the free kappa light chain monoclonal there were previously characterized in our laboratory on 7/10/2019. Consider a serum immunofixation for confirmation if clinically indicated. Hypoalbuminemia. Pathologic significance requires clinical correlation. Hayden Turner M.D.,  Ph.D., Pathologist.    Gamma Fraction 0.7 - 1.6 g/dL 1.1    IGA 84 - 499 mg/dL 309     - 1,616 mg/dL 1,210    IGM 35 - 242 mg/dL 20 (L)    [place] Free Lt Chain 0.33 - 1.94 mg/dL 6.37 (H)    Kappa Lambda Ratio 0.26 - 1.65  2.39 (H)    Lambda Free Lt Chain 0.57 - 2.63 mg/dL 2.67 (H)    Monoclonal Peak <=0.0 g/dL 0.1 (H)    Total Protein Serum for ELP 6.4 - 8.3 g/dL 6.6    (H): Data is abnormally high  (L): Data is abnormally low      Kaleb Ramirez MD

## 2023-11-28 NOTE — NURSING NOTE
Amos Walker's chief complaint for this visit includes:  Chief Complaint   Patient presents with    Consult     Foot ulcer follow up and wound care     PCP: Racheal Swift    Referring Provider:  No referring provider defined for this encounter.    There were no vitals taken for this visit.  Data Unavailable     Do you need any medication refills at today's visit? NO    Allergies   Allergen Reactions    No Clinical Screening - See Comments      methylisothiazolinone    Seasonal Allergies     Simvastatin Other (See Comments)     Sun sensitivty    Methylisothiazolinone Rash    Neomycin      Wound gets worse    Povidone Iodine Rash       Chiquis Morris LPN
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
## 2023-11-30 ENCOUNTER — OFFICE VISIT (OUTPATIENT)
Dept: PODIATRY | Facility: CLINIC | Age: 78
End: 2023-11-30
Payer: COMMERCIAL

## 2023-11-30 ENCOUNTER — DOCUMENTATION ONLY (OUTPATIENT)
Dept: INTERNAL MEDICINE | Facility: CLINIC | Age: 78
End: 2023-11-30

## 2023-11-30 DIAGNOSIS — S80.821S: ICD-10-CM

## 2023-11-30 DIAGNOSIS — L97.922 SKIN ULCER OF LEFT LOWER LEG WITH FAT LAYER EXPOSED (H): ICD-10-CM

## 2023-11-30 DIAGNOSIS — E11.610 CHARCOT FOOT DUE TO DIABETES MELLITUS (H): ICD-10-CM

## 2023-11-30 DIAGNOSIS — I87.8 VENOUS STASIS: ICD-10-CM

## 2023-11-30 DIAGNOSIS — E11.51 DIABETES MELLITUS WITH PERIPHERAL VASCULAR DISEASE (H): Primary | ICD-10-CM

## 2023-11-30 DIAGNOSIS — E11.49 TYPE II OR UNSPECIFIED TYPE DIABETES MELLITUS WITH NEUROLOGICAL MANIFESTATIONS, NOT STATED AS UNCONTROLLED(250.60) (H): ICD-10-CM

## 2023-11-30 PROCEDURE — 99215 OFFICE O/P EST HI 40 MIN: CPT | Performed by: PODIATRIST
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
## 2023-11-30 NOTE — PROGRESS NOTES
Type of Form Received:     Form Received (Date) 11/30/23   Form Filled out Yes, seen 12/8   Placed in provider folder Yes

## 2023-11-30 NOTE — NURSING NOTE
Amos Walker's chief complaint for this visit includes:  No chief complaint on file.    PCP: Racheal Swift    Referring Provider:  No referring provider defined for this encounter.    There were no vitals taken for this visit.  Data Unavailable     Do you need any medication refills at today's visit? NO    Allergies   Allergen Reactions    No Clinical Screening - See Comments      methylisothiazolinone    Seasonal Allergies     Simvastatin Other (See Comments)     Sun sensitivty    Methylisothiazolinone Rash    Neomycin      Wound gets worse    Povidone Iodine Rash       Chiquis Morris LPN

## 2023-12-01 ENCOUNTER — MEDICAL CORRESPONDENCE (OUTPATIENT)
Dept: HEALTH INFORMATION MANAGEMENT | Facility: CLINIC | Age: 78
End: 2023-12-01
Payer: COMMERCIAL

## 2023-12-01 ENCOUNTER — MYC MEDICAL ADVICE (OUTPATIENT)
Dept: INTERNAL MEDICINE | Facility: CLINIC | Age: 78
End: 2023-12-01
Payer: COMMERCIAL

## 2023-12-04 ENCOUNTER — TELEPHONE (OUTPATIENT)
Dept: INTERNAL MEDICINE | Facility: CLINIC | Age: 78
End: 2023-12-04

## 2023-12-04 ENCOUNTER — OFFICE VISIT (OUTPATIENT)
Dept: PODIATRY | Facility: CLINIC | Age: 78
End: 2023-12-04
Payer: COMMERCIAL

## 2023-12-04 DIAGNOSIS — S80.821S: ICD-10-CM

## 2023-12-04 DIAGNOSIS — E11.49 TYPE II OR UNSPECIFIED TYPE DIABETES MELLITUS WITH NEUROLOGICAL MANIFESTATIONS, NOT STATED AS UNCONTROLLED(250.60) (H): ICD-10-CM

## 2023-12-04 DIAGNOSIS — E11.51 DIABETES MELLITUS WITH PERIPHERAL VASCULAR DISEASE (H): Primary | ICD-10-CM

## 2023-12-04 DIAGNOSIS — E11.610 CHARCOT FOOT DUE TO DIABETES MELLITUS (H): ICD-10-CM

## 2023-12-04 DIAGNOSIS — I87.8 VENOUS STASIS: ICD-10-CM

## 2023-12-04 DIAGNOSIS — L97.922 SKIN ULCER OF LEFT LOWER LEG WITH FAT LAYER EXPOSED (H): ICD-10-CM

## 2023-12-04 PROCEDURE — 99215 OFFICE O/P EST HI 40 MIN: CPT | Performed by: PODIATRIST

## 2023-12-04 NOTE — NURSING NOTE
Amos Walker's chief complaint for this visit includes:  Chief Complaint   Patient presents with    Consult     Leg check     PCP: Racheal Swift    Referring Provider:  No referring provider defined for this encounter.    There were no vitals taken for this visit.  Data Unavailable     Do you need any medication refills at today's visit? NO    Allergies   Allergen Reactions    No Clinical Screening - See Comments      methylisothiazolinone    Seasonal Allergies     Simvastatin Other (See Comments)     Sun sensitivty    Methylisothiazolinone Rash    Neomycin      Wound gets worse    Povidone Iodine Rash       Chiquis Morris LPN

## 2023-12-04 NOTE — CONFIDENTIAL NOTE
I received a detailed request from the dentists. I think patient will require a preop visit given the complexity of his care and the detailed questions.  Can we call him to advise scheduling?  Could be with me in [facility] or resident/other provider.    Racheal Swift MD  Internal Medicine
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
## 2023-12-08 ENCOUNTER — OFFICE VISIT (OUTPATIENT)
Dept: INTERNAL MEDICINE | Facility: CLINIC | Age: 78
End: 2023-12-08
Payer: COMMERCIAL

## 2023-12-08 VITALS
BODY MASS INDEX: 22.24 KG/M2 | HEART RATE: 95 BPM | DIASTOLIC BLOOD PRESSURE: 70 MMHG | WEIGHT: 175.6 LBS | SYSTOLIC BLOOD PRESSURE: 122 MMHG | OXYGEN SATURATION: 100 %

## 2023-12-08 DIAGNOSIS — K02.9 DENTAL CARIES: Primary | ICD-10-CM

## 2023-12-08 DIAGNOSIS — E11.51 DIABETES MELLITUS WITH PERIPHERAL VASCULAR DISEASE (H): ICD-10-CM

## 2023-12-08 DIAGNOSIS — Z96.643 AFTERCARE FOLLOWING BILATERAL HIP JOINT REPLACEMENT SURGERY: ICD-10-CM

## 2023-12-08 DIAGNOSIS — M80.00XD AGE-RELATED OSTEOPOROSIS WITH CURRENT PATHOLOGICAL FRACTURE WITH ROUTINE HEALING: ICD-10-CM

## 2023-12-08 DIAGNOSIS — Z47.1 AFTERCARE FOLLOWING BILATERAL HIP JOINT REPLACEMENT SURGERY: ICD-10-CM

## 2023-12-08 DIAGNOSIS — I25.10 CORONARY ARTERY DISEASE INVOLVING NATIVE CORONARY ARTERY OF NATIVE HEART WITHOUT ANGINA PECTORIS: ICD-10-CM

## 2023-12-08 PROCEDURE — 99214 OFFICE O/P EST MOD 30 MIN: CPT | Performed by: INTERNAL MEDICINE

## 2023-12-08 NOTE — PROGRESS NOTES
Amos is a 78 year old that presents in clinic today for the following:     Chief Complaint   Patient presents with    Pre-Op Exam           Screenings from encounters over the past 10 days    No data recorded       Surgeon: Vera Adhikari DDS (Attending) and Chapito Mckenzie (Student)  Fax number for Preop Evaluation: [phone]  Location of Surgery: [facility]   [address] [place] [address]  Date of Surgery: Not scheduled yet   Procedure: Cleaning, Filings and Extraction   History of reaction to anesthesia? No    MORE Corley at 7:38 AM on 12/8/2023.

## 2023-12-08 NOTE — COMMUNITY RESOURCES LIST (ENGLISH)
12/08/2023   [facility]  N/A  For questions about this resource list or additional care needs, please contact your primary care clinic or care manager.  Phone: [phone]   Email: N/A   Address: [facility] [place] [address]   Hours: N/A        Transportation       Free or low-cost transportation  1  [facility] Distance: 7.97 miles      In-Person   [address] [place] [address]  Language: English  Hours: Mon - Wed 9:30 AM - 12:00 PM , Mon - Wed 1:00 PM - 2:00 PM Appt. Only  Fees: Free   Phone: (303) 491-6612 Email: info@saintolaf.org Website: http://www.saintolaf.org/     2  [facility] - [facility] [employer] - Free or low-cost transportation Distance: 8.16 miles      In-Person   [address] [place] [address]  Language: English  Hours: Tue 9:30 AM - 11:30 AM , Thu 9:30 AM - 11:30 AM  Fees: Free   Phone: (774) 240-4871 Email: info@makerist Website: http://www.makerist/     Transportation to medical appointments  3  [facility]   Family Wellness ([facility]IF[facility]) Distance: 2.54 miles      In-Person   [address] [place] [address]  Language: Romanian, Divehi, English, Gujarati, Craig, Tajik, Maori, Arabic, Indonesian, Upper sorbian  Hours: Mon - Wed 9:00 AM - 5:00 PM , Thu 12:00 PM - 6:00 PM , Fri 9:00 AM - 5:00 PM , Sun 10:30 AM - 2:00 PM Appt. Only  Fees: Free   Phone: (546) 633-6295 Email: info@sew-aifw.org Website: https://www.sewa-aifw.org/     4  [place] [facility] Distance: 7.24 miles      In-Person   [address] [facility] [address] [place] [address]  Language: English  Hours: Mon - Sat 4:00 AM - 6:00 PM  Fees: Insurance, Self Pay   Phone: (713) 248-6812 Email: delighttransportation1@Ceon Website: [email or web address]/Providers/Delight_Transportation/Transportation/[email or web address]?returnUrl=%2FHelpMeConnect%2FSearch%2FBasicNeeds%2FTransportation%2FTransportationServices%3Fstart%3D40          Important Numbers  & Websites       Emergency Services   [address]  [place] [address]  Poison Control   (309) 295-2585  Suicide Prevention Lifeline   (785) 157-6197 (TALK)  Child Abuse Hotline   (613) 641-7475 (4-A-Child)  Sexual Assault Hotline   (850) 497-6516 (HOPE)  National Runaway Safeline   (775) 755-9588 (RUNAWAY)  All-Options Talkline   (798) 240-1279  Substance Abuse Referral   (635) 828-2358 (HELP)

## 2023-12-08 NOTE — PATIENT INSTRUCTIONS
Please hold alendronate (fosamax) until after the dental procedures have healed.  It is a good medication and so I do recommend you restart it after    Please discuss the plavix/aspirin with your dental team.    Please make sure to schedule an appointment with cardiology.  I have ordered an ultrasound of the heart.

## 2023-12-08 NOTE — PROGRESS NOTES
"Assessment & Plan   Dental caries  Here for medical recommendations prior to dental extractions.  Please see recommendations below    Diabetes mellitus with peripheral vascular disease (H)  Excellent control.  No additional recommendations for management prior to dental procedure    Age-related osteoporosis with current pathological fracture with routine healing  He recently started taking fosamax about 1 month prior.  Will have him hold the medication starting today and resume once dental extractions have healed to minimize risk for osteonecrosis    Aftercare following bilateral hip joint replacement surgery  Has hx of bilateral hip replacements.  There is not currently strong evidence for the use of antibiotic prophylaxis for joint replacements.  This was not recommended    Coronary artery disease involving native coronary artery of native heart without angina pectoris  He has known CAD with stents on ASA/plavix  He does not have a cardiac condition for which antibiotic prophylaxis would be recommended  Given that stents were placed remotely, he may hold ASA/plavix if recommended by the dental team  GIven his extensive cardiac hx without any recent functional assessment, will order a TTE.  This is not necessary prior to his dental procedure.  He plans to schedule an appointment with cardiology  - Echocardiogram Complete    Tob use:  Recommended cessation.  He declined any assistance at this time.    RTC: Return if symptoms worsen or fail to improve.  No LOS data to display  Time was spent doing chart review, history and exam, documentation and further activities as noted above.    Skyla Small MD      Chief Complaint   Amos Walker is a 78 year old male presents for the following health issues    History of Present Illness   Here for discussion prior to dental appointment  He has extraction planned    He is on alendronate    He is on plavix + ASA  No recent stents, but he does have a hx of \"a whole " "bunch of stents\"    Has DM.  Under excellent control.  Last A1C in Nov was 6.3    He is currently a smoker. 10 cig per day.  Down from 2 ppd.    He has a hx of bilateral hip replacements.  Most recent        Tobacco Use      Smoking status: Every Day        Packs/day: 0.25        Years: 50.00        Additional pack years: 0.00        Total pack years: 12.50        Types: Cigarettes      Smokeless tobacco: Never    PHQ-2 Score:       7/19/2023    12:21 PM 4/5/2023     7:35 AM   PHQ-2 ( 1999 [employer])   Q1: Little interest or pleasure in doing things 1 0   Q2: Feeling down, depressed or hopeless 0 0   PHQ-2 Score 1 0       ROS    Physical Exam   /70 (BP Location: Left arm, Patient Position: Sitting, Cuff Size: Adult Regular)   Pulse 95   Wt 79.7 kg (175 lb 9.6 oz)   SpO2 100%   BMI 22.24 kg/m    Gen: no distress, comfortable, pleasant   Eyes: anicteric, normal extra-ocular movements   Cardiovascular: regular rate and rhythm, normal S1 and S2, no murmurs, rubs or gallops, peripheral pulses full and symmetric   Respiratory: clear to auscultation, no wheezes or crackles, normal breath sounds   Skin: no concerning lesions, no jaundice   Psychological: appropriate mood     Items reviewed:   Allergies  Meds            "

## 2023-12-11 ENCOUNTER — DOCUMENTATION ONLY (OUTPATIENT)
Dept: INTERNAL MEDICINE | Facility: CLINIC | Age: 78
End: 2023-12-11
Payer: COMMERCIAL

## 2023-12-11 NOTE — PROGRESS NOTES
Type of Form Received:     Form Received (Date) 12/11/23   Form Filled out Yes, faxed 12/26   Placed in provider folder Yes

## 2023-12-12 ENCOUNTER — TELEPHONE (OUTPATIENT)
Dept: INTERNAL MEDICINE | Facility: CLINIC | Age: 78
End: 2023-12-12
Payer: COMMERCIAL

## 2023-12-18 ENCOUNTER — ANCILLARY PROCEDURE (OUTPATIENT)
Dept: ULTRASOUND IMAGING | Facility: CLINIC | Age: 78
End: 2023-12-18
Attending: NURSE PRACTITIONER
Payer: COMMERCIAL

## 2023-12-18 ENCOUNTER — OFFICE VISIT (OUTPATIENT)
Dept: PODIATRY | Facility: CLINIC | Age: 78
End: 2023-12-18
Payer: COMMERCIAL

## 2023-12-18 DIAGNOSIS — L97.922 SKIN ULCER OF LEFT LOWER LEG WITH FAT LAYER EXPOSED (H): ICD-10-CM

## 2023-12-18 DIAGNOSIS — I87.8 VENOUS STASIS: ICD-10-CM

## 2023-12-18 DIAGNOSIS — E11.49 TYPE II OR UNSPECIFIED TYPE DIABETES MELLITUS WITH NEUROLOGICAL MANIFESTATIONS, NOT STATED AS UNCONTROLLED(250.60) (H): ICD-10-CM

## 2023-12-18 DIAGNOSIS — S90.424S: ICD-10-CM

## 2023-12-18 DIAGNOSIS — L08.9 INFECTION OF TOE: ICD-10-CM

## 2023-12-18 DIAGNOSIS — E11.610 CHARCOT FOOT DUE TO DIABETES MELLITUS (H): ICD-10-CM

## 2023-12-18 DIAGNOSIS — E11.51 DIABETES MELLITUS WITH PERIPHERAL VASCULAR DISEASE (H): Primary | ICD-10-CM

## 2023-12-18 DIAGNOSIS — I73.9 PAD (PERIPHERAL ARTERY DISEASE) (H): ICD-10-CM

## 2023-12-18 DIAGNOSIS — I70.243 ATHEROSCLEROSIS OF NATIVE ARTERY OF LEFT LOWER EXTREMITY WITH ULCERATION OF ANKLE (H): ICD-10-CM

## 2023-12-18 PROCEDURE — 99214 OFFICE O/P EST MOD 30 MIN: CPT | Performed by: PODIATRIST

## 2023-12-18 PROCEDURE — 93922 UPR/L XTREMITY ART 2 LEVELS: CPT | Performed by: RADIOLOGY

## 2023-12-18 RX ORDER — LEVOFLOXACIN 750 MG/1
750 TABLET, FILM COATED ORAL DAILY
Qty: 14 TABLET | Refills: 0 | Status: SHIPPED | OUTPATIENT
Start: 2023-12-18
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
## 2023-12-18 NOTE — NURSING NOTE
Amos Walker's chief complaint for this visit includes:  Chief Complaint   Patient presents with    Consult     Leg sores follow up and wound care      PCP: Racheal Swift    Referring Provider:  No referring provider defined for this encounter.    There were no vitals taken for this visit.  Data Unavailable     Do you need any medication refills at today's visit? NO    Allergies   Allergen Reactions    No Clinical Screening - See Comments      methylisothiazolinone    Seasonal Allergies     Simvastatin Other (See Comments)     Sun sensitivty    Methylisothiazolinone Rash    Neomycin      Wound gets worse    Povidone Iodine Rash       Chiquis Morris LPN

## 2023-12-19 ENCOUNTER — HOSPITAL ENCOUNTER (OUTPATIENT)
Dept: CARDIOLOGY | Facility: CLINIC | Age: 78
Discharge: HOME | End: 2023-12-19
Attending: INTERNAL MEDICINE | Admitting: INTERNAL MEDICINE
Payer: COMMERCIAL

## 2023-12-19 DIAGNOSIS — I25.10 CORONARY ARTERY DISEASE INVOLVING NATIVE CORONARY ARTERY OF NATIVE HEART WITHOUT ANGINA PECTORIS: ICD-10-CM

## 2023-12-19 LAB — LVEF ECHO: NORMAL

## 2023-12-19 PROCEDURE — 93306 TTE W/DOPPLER COMPLETE: CPT

## 2023-12-19 PROCEDURE — 93306 TTE W/DOPPLER COMPLETE: CPT | Mod: 26 | Performed by: INTERNAL MEDICINE

## 2023-12-26 ENCOUNTER — TELEPHONE (OUTPATIENT)
Dept: VASCULAR SURGERY | Facility: CLINIC | Age: 78
End: 2023-12-26
Payer: COMMERCIAL

## 2023-12-26 NOTE — TELEPHONE ENCOUNTER
Patient Contacted     Appointment type: RPAD  Provider: JACQUELIN  Return date: 1/5/24  Specialty phone number: [phone]  Additional appointment(s) needed: NA  Additonal Notes: per RN.

## 2024-01-02 ENCOUNTER — OFFICE VISIT (OUTPATIENT)
Dept: PODIATRY | Facility: CLINIC | Age: 79
End: 2024-01-02
Payer: COMMERCIAL

## 2024-01-02 DIAGNOSIS — E11.51 DIABETES MELLITUS WITH PERIPHERAL VASCULAR DISEASE (H): Primary | ICD-10-CM

## 2024-01-02 DIAGNOSIS — E11.49 TYPE II OR UNSPECIFIED TYPE DIABETES MELLITUS WITH NEUROLOGICAL MANIFESTATIONS, NOT STATED AS UNCONTROLLED(250.60) (H): ICD-10-CM

## 2024-01-02 DIAGNOSIS — E11.610 CHARCOT FOOT DUE TO DIABETES MELLITUS (H): ICD-10-CM

## 2024-01-02 DIAGNOSIS — S90.424S BLISTER OF TOE OF RIGHT FOOT, SEQUELA: ICD-10-CM

## 2024-01-02 DIAGNOSIS — I87.8 VENOUS STASIS: ICD-10-CM

## 2024-01-02 DIAGNOSIS — L97.922 SKIN ULCER OF LEFT LOWER LEG WITH FAT LAYER EXPOSED (H): ICD-10-CM

## 2024-01-02 PROCEDURE — 99214 OFFICE O/P EST MOD 30 MIN: CPT | Performed by: PODIATRIST

## 2024-01-02 RX ORDER — LEVOFLOXACIN 750 MG/1
750 TABLET, FILM COATED ORAL DAILY
Qty: 14 TABLET | Refills: 0 | Status: SHIPPED | OUTPATIENT
Start: 2024-01-02
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
## 2024-01-02 NOTE — NURSING NOTE
Amos Walker's chief complaint for this visit includes:  Chief Complaint   Patient presents with    Consult     Leg check and wound care     PCP: Racheal Swift    Referring Provider:  No referring provider defined for this encounter.    There were no vitals taken for this visit.  Data Unavailable     Do you need any medication refills at today's visit? NO    Allergies   Allergen Reactions    No Clinical Screening - See Comments      methylisothiazolinone    Seasonal Allergies     Simvastatin Other (See Comments)     Sun sensitivty    Methylisothiazolinone Rash    Neomycin      Wound gets worse    Povidone Iodine Rash       Chiquis Morris LPN

## 2024-01-03 ENCOUNTER — VIRTUAL VISIT (OUTPATIENT)
Dept: ORTHOPEDICS | Facility: CLINIC | Age: 79
End: 2024-01-03
Payer: COMMERCIAL

## 2024-01-03 DIAGNOSIS — Z96.649 S/P HIP HEMIARTHROPLASTY: Primary | ICD-10-CM

## 2024-01-03 PROCEDURE — 99213 OFFICE O/P EST LOW 20 MIN: CPT | Performed by: ORTHOPAEDIC SURGERY

## 2024-01-03 NOTE — NURSING NOTE
Reason For Visit:   Chief Complaint   Patient presents with    RECHECK     Progress check right hip // been doing PT        There were no vitals taken for this visit.    Pain Assessment  Patient Currently in Pain: Antione Tamez, ATC

## 2024-01-03 NOTE — PROGRESS NOTES
This patient is 7 months out from a right hip arthroplasty for femoral neck fracture.  2 months ago he had some anterior groin pain we are concerned this might represent somewhere but since then he has noted that the pain resolved and attributes this to a muscle strain.  He continues to work with a therapist each week on his strength and endurance.  He does note low back pain when he is doing standing activities if they last long enough.  This is relieved by sitting.    Over the video visit the patient was alert oriented and in no acute distress.  Respirations regular and unlabored and eyes are nonicteric.    He did not have any new imaging today.    Our plan is to have him return for a visit in 5 months with x-rays of the right hip.  This would be a full 12 months from his hip hemiarthroplasty.  He will return sooner if he notices increased pain or other concerning symptoms.  I have answered all of his questions to today.    I talked with this patient today through virtual video visit from 3:30 PM to 3:36 PM.

## 2024-01-03 NOTE — LETTER
1/3/2024         RE: Amos Walker  [address] [facility] [address]        Dear Colleague,    Thank you for referring your patient, Amos Walker, to the [facility] [place]. Please see a copy of my visit note below.    This patient is 7 months out from a right hip arthroplasty for femoral neck fracture.  2 months ago he had some anterior groin pain we are concerned this might represent somewhere but since then he has noted that the pain resolved and attributes this to a muscle strain.  He continues to work with a therapist each week on his strength and endurance.  He does note low back pain when he is doing standing activities if they last long enough.  This is relieved by sitting.    Over the video visit the patient was alert oriented and in no acute distress.  Respirations regular and unlabored and eyes are nonicteric.    He did not have any new imaging today.    Our plan is to have him return for a visit in 5 months with x-rays of the right hip.  This would be a full 12 months from his hip hemiarthroplasty.  He will return sooner if he notices increased pain or other concerning symptoms.  I have answered all of his questions to today.    I talked with this patient today through virtual video visit from 3:30 PM to 3:36 PM.    Amos is a 78 year old who is being evaluated via a billable video visit.      How would you like to obtain your AVS? MyChart  Will anyone else be joining your video visit? No      Video-Visit Details    Type of service:  Video Visit   Video Start Time: 3:31 PM  Video End Time: 3:36 p.m.    Originating Location (pt. Location): Home    Distant Location (provider location):  On-site  Platform used for Video Visit: Kellie Keller MD

## 2024-01-03 NOTE — PROGRESS NOTES
Amos is a 78 year old who is being evaluated via a billable video visit.      How would you like to obtain your AVS? MyChart  Will anyone else be joining your video visit? No      Video-Visit Details    Type of service:  Video Visit   Video Start Time: 3:31 PM  Video End Time: 3:36 p.m.    Originating Location (pt. Location): Home    Distant Location (provider location):  On-site  Platform used for Video Visit: Kellie

## 2024-01-05 ENCOUNTER — OFFICE VISIT (OUTPATIENT)
Dept: VASCULAR SURGERY | Facility: CLINIC | Age: 79
End: 2024-01-05
Payer: COMMERCIAL

## 2024-01-05 VITALS — SYSTOLIC BLOOD PRESSURE: 138 MMHG | OXYGEN SATURATION: 100 % | DIASTOLIC BLOOD PRESSURE: 72 MMHG | HEART RATE: 87 BPM

## 2024-01-05 DIAGNOSIS — F17.210 SMOKING GREATER THAN 40 PACK YEARS: ICD-10-CM

## 2024-01-05 DIAGNOSIS — I73.9 PERIPHERAL ARTERIAL DISEASE WITH HISTORY OF REVASCULARIZATION (H): Primary | ICD-10-CM

## 2024-01-05 DIAGNOSIS — Z98.890 PERIPHERAL ARTERIAL DISEASE WITH HISTORY OF REVASCULARIZATION (H): Primary | ICD-10-CM

## 2024-01-05 PROCEDURE — 99213 OFFICE O/P EST LOW 20 MIN: CPT | Performed by: HOSPITALIST

## 2024-01-05 NOTE — LETTER
"1/5/2024       RE: Amos Walker  [address] [facility] [address]     Dear Colleague,    Thank you for referring your patient, Amos Walker, to the [facility] [place] [facility]. Please see a copy of my visit note below.    VASCULAR MEDICINE CONSULT NOTE          LOCATION:  [facility]        Date of Service: 1/5/2024      Primary Care Provider: Racheal Swift  Referring provider;  No ref. provider found      Reason for the visit/chief complaint:   Peripheral arterial disease      HPI:  Amos Walker is a pleasant 78 year old male who presents to our our Vascular Medicine clinic who presents to our clinic with the above-mentioned complaint.    Mr. Walker has medical history significant for type 2 diabetes mellitus, tobacco smoking, hypertension, dyslipidemia, CAD s/p MARCELL x4 in march 2018, CKD stage 3, venous insufficiency with previous left leg vein stripping and peripheral arterial disease status post previous interventions.    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  He has stable claudications with fast pace, long distance walking \"although he is unable to tell the exact distance\", this has not been limiting his lifestyle and has not been progressive.  These are mainly on bilateral thighs.  Reports the pain was worse after hip surgery but gradually improved as he increased his activities with PT.  He continues to be able to do his regular activities, grocery shopping, etc. No pain at rest.  No lower extremity wounds.  He is not currently doing any exercising or walking regularly.    With regards to his previous PAD interventions, he was previously following up with Dr. Maharaj and Dr. Lyons in vascular surgery.  He is s/p right femoral endarterectomy, SFA stent and PTA of the popliteal artery on 9/14/2018 for " nonhealing right lower extremity ulcer.  Subsequently had left SFA and popliteal artery balloon angioplasty 1/12/2021 with Dr. Maharaj also for left medial malleolus nonhealing ulceration.  No intervention since.    Last follow-up with Dr. Lyons was May 2022.       Most recent CAROL study done last month 12/18/2023, resting CAROL 0.63 on the right (0.56 last year January 2023) and 0.51 on the left (0.54 last year).      Risk factors:  Long history of tobacco smoking, currently active. Smoked since 20, 2 PPD now down to 10 cig/d.  Type 2 diabetes mellitus: For over 25 years.  This has been well-controlled.  Hemoglobin A1c 6.3%.  Hypertension: Only on lisinopril 2.5 mg in the setting of diabetes  Dyslipidemia: Has been on simvastatin 40 mg for years.  LDL within goal 39.          REVIEW OF SYSTEMS:    A 12 point ROS was reviewed and is negative except what is mentioned in HPI.       Past medical history, surgical history, medications, family history, social history and allergies were reviewed. Pertinent points mentioned under HPI.         OBJECTIVE:    Vital signs:  /72 (BP Location: Left arm, Patient Position: Sitting, Cuff Size: Adult Regular)   Pulse 87   SpO2 100%   Wt Readings from Last 1 Encounters:   12/08/23 175 lb 9.6 oz     There is no height or weight on file to calculate BMI.    Physical exam:  General appearance: Pleasant male in no apparent distress.    HEENT: NC/AT.    Neck: Carotids +2/2 bilaterally without bruits.  No jugular venous distension.   Heart: RRR. Normal S1, S2. No murmur, rub, click, or gallop.   Chest: Clear to auscultation bilaterally.  Abdomen: Soft, nontender, nondistended. No pulsatile mass.  No bruits.   Extremities: No lower extremity swelling.  Unable to feel peripheral pulses on bilateral lower extremities.  Skin: No lower extremity wounds.  Neurological: Alert, awake and oriented         DIAGNOSTIC STUDIES:   Labs and diagnostics reviewed including outside records. Pertinent  points are mentioned under HPI and assessment and plan sections.        ASSESSMENT AND PLAN:    Moderate peripheral arterial disease with previous bilateral intervention, stable  Long history of tobacco smoking: Active  Type 2 diabetes mellitus, controlled A1c 6.3%  CAD status post previous MARCELL x 4  Hypertension: Controlled  Dyslipidemia: Controlled  CKD stage III  Venous insufficiency status post remote left lower extremity vein stripping    Mr. Walker has a stable peripheral arterial disease with stable claudication only with fast pace, long distance walking.  He however has been very limited in his walking especially after his recent right hip replacement surgery last summer.  His activity is increasing gradually.  We overall discussed the importance of medical management, risk factor control and walking program.  We discussed that the most important risk factor to control at this point is smoking cessation.  Unfortunately, he continues to smoke approximately 10 cigarettes a day.  We discussed the use of nicotine replacement therapy as well as pharmacological treatment such as varenicline and bupropion.  He was not currently interested.    We also spent some time discussing walking program.  We discussed the data between supervised and home-based walking program.  He would like to start home-based walking program.  He is planning to walk in a local shopping center near his house.  We discussed walking at least 30 minutes, 3 times a week.  We provided him with details about walking program and instructions.    He is appropriately on maximum medical therapy currently maintained on dual antiplatelet therapy with aspirin and clopidogrel.  No bleeding consequences.  He is on simvastatin 40 mg.  Although not high intensity, his LDL has remained below 70 at 39.      Recommendations:  Start home-based walking program.  Instructions and education material provided.  Smoking cessation is of utmost importance.  We did  discuss this extensively.  Patient currently defers any nicotine replacement or pharmacological therapy prescription.  Continue current medical therapy with DAPT and statins.  Follow-up in 3 months to assess improvement in claudication symptoms with walking program.  No need to repeat CAROL study prior to next visit.    It was a pleasure meeting with Mr. Walker in our clinic today.          Again, thank you for allowing me to participate in the care of your patient.      Sincerely,    Fernando Shipley MD

## 2024-01-05 NOTE — PATIENT INSTRUCTIONS
Thank you so much for choosing us for your care. It was a pleasure to see you at the vascular clinic today.     Follow-up recommendations: We will see you back in 3 months. Please do not hesitate to reach out to us in the meantime with any concerns. Our schedulers will get in touch with you to set up your follow-up visit.    Additional testing/imaging ordered today: None      Our scheduling team will get in touch with you to set up any follow-up testing/imaging and/or appointments. Please be aware that any testing/imaging recommended today will need to completed prior to your next visit with the provider. If testing/imaging is not completed prior to your next visit, your visit may be rescheduled.        If you have any questions, please contact our clinic directly at [phone] and ask for the nurse. We also encourage the use of RegBinder to communicate with your HealthCare Provider.      If you have an urgent need after business hours (8:00 am to 4:30 pm) please call [phone], option 4, and ask for the vascular attending on call. For non-urgent after hours needs, please call the vascular clinic at [phone]. For scheduling needs, please call our clinic directly at [phone].    Learning About BE FAST: Stroke Warning Signs  BE FAST is a simple way to remember the main symptoms of stroke. These symptoms happen suddenly. So learning what to look for helps you know when to call for medical help. BE FAST stands for:    B - Balance.  Loss of balance or trouble walking.     E - Eyes.  Trouble seeing out of one or both eyes.     F - Face.  Weakness or drooping on one side of the face.     A - Arm.  Weakness or numbness in an arm or leg.     S - Speech.  Trouble speaking.     T - Time to call 911.  Also call 911 if you have other stroke symptoms. They include:  Sudden confusion.  Sudden trouble understanding simple statements.  Fainting.  A seizure.  A sudden, severe headache.     A stroke happens when a blood  "vessel in the brain bursts or is blocked by a blood clot. The blood supply to part of the brain--and the oxygen the blood carries--is reduced. This damages the brain.   If you have a stroke, quick treatment may save your life. And it may reduce the damage in your brain so that you have fewer problems after the stroke.   Where can you learn more?  Go to https://www.Recommind.net/patiented  Enter E640 in the search box to learn more about \"Learning About BE FAST: Stroke Warning Signs.\"  Current as of: December 18, 2022               Content Version: 13.8    8966-1290 [employer].   Care instructions adapted under license by your healthcare professional. If you have questions about a medical condition or this instruction, always ask your healthcare professional. [employer] disclaims any warranty or liability for your use of this information.    Learning About How to Prevent a Stroke  What is a stroke?  A stroke is damage to the brain that occurs when a blood vessel in the brain bursts or is blocked by a blood clot. Without blood and the oxygen it carries, part of the brain starts to die. The part of the body controlled by the damaged area of the brain can't work properly.  Brain damage can start within minutes of a stroke. But quick treatment can help limit the damage and increase the chance of a full recovery.  What puts you at risk for stroke?  A risk factor is anything that makes you more likely to have a particular health problem.  Risk factors for stroke that you can manage or change include:  Health problems like atrial fibrillation, diabetes, high blood pressure, high cholesterol, hardening of the arteries (atherosclerosis), and sickle cell disease.  Smoking.  Drinking more than 2 alcoholic drinks a day for men and 1 drink a day for women.  Being overweight.  Not eating healthy foods.  Not getting enough physical activity.  Risk factors you can't change include:  Having a previous " stroke.  Family history of stroke.  Being older.  Being , [place] Native, , or South  American.  Being female.  Having certain problems during pregnancy, such as preeclampsia.  Being past menopause.  Your doctor can help you know your risk. Then you and your doctor can talk about whether to take steps to lower it.  How can you help prevent a stroke?  Here are some things you can do to help prevent a stroke.  Manage health problems that raise your risk. These include atrial fibrillation, diabetes, high blood pressure, and high cholesterol.  Have a heart-healthy lifestyle.  Don't smoke. If you need help quitting, talk to your doctor about stop-smoking programs and medicines. These can increase your chances of quitting for good.  Limit alcohol to 2 drinks a day for men and 1 drink a day for women.  Stay at a healthy weight. Lose weight if you need to.  Be active. Get at least 30 minutes of exercise on most days of the week. Walking is a good choice. You also may want to do other activities, such as running, swimming, cycling, or playing tennis or team sports.  Eat heart-healthy foods. These include vegetables, fruits, nuts, beans, lean meat, fish, and whole grains. Limit sodium and sugar.  If you think you may have a problem with alcohol or drug use, talk to your doctor.  If you use hormone therapy for menopause or hormonal birth control, talk with your doctor. Ask if these are right for you. They may raise the risk of stroke in some people.  Decide with your doctor whether you will also take medicines to help lower your risk. For example, you and your doctor may decide you will take a medicine that prevents blood clots.  What are the BE FAST stroke warning signs?  BE FAST is a simple way to remember the main symptoms of stroke. These symptoms happen suddenly. So knowing what to look for helps you know when to call for medical help.  BE FAST stands for:  B alance. Loss of balance or  "trouble walking.  E yes. Trouble seeing out of one or both eyes.  F ace. Weakness or drooping on one side of the face.  A rm. Weakness or numbness in an arm or leg.  S peech. Trouble speaking.  T jeronimo to call 911.  Other stroke symptoms include sudden confusion, sudden trouble understanding simple statements, fainting, a seizure, and a sudden, severe headache.  What are the symptoms of a stroke?  Symptoms of a stroke happen quickly. A stroke may cause:  Sudden numbness, tingling, weakness, or loss of movement in your face, arm, or leg, especially on only one side of your body.  Sudden vision changes.  Sudden trouble speaking.  Sudden confusion or trouble understanding simple statements.  Sudden problems with walking or balance.  A sudden, severe headache that is different from past headaches.  Fainting.  A seizure.  It's important to call for medical help if you have stroke symptoms. Quick treatment may save your life. And it may reduce the damage in your brain so that you have fewer problems after the stroke.  Follow-up care is a key part of your treatment and safety. Be sure to make and go to all appointments, and call your doctor if you are having problems. It's also a good idea to know your test results and keep a list of the medicines you take.  Where can you learn more?  Go to https://www.[employer].net/patiented  Enter G757 in the search box to learn more about \"Learning About How to Prevent a Stroke.\"  Current as of: December 18, 2022               Content Version: 13.8    8189-6280 [employer].   Care instructions adapted under license by your healthcare professional. If you have questions about a medical condition or this instruction, always ask your healthcare professional. [employer] disclaims any warranty or liability for your use of this information.    PAD WALKING PROGRAM EDUCATION    What is PAD?          Peripheral arterial disease (PAD) is narrowing or blockage of arteries " that causes poor blood flow to your arms and legs. PAD is most common in the legs.  PAD is often caused by fatty buildup (plaque) in the arteries. Over time, plaque builds up in the walls of the arteries, including those that supply blood to your legs. This can limit blood flow to the muscles and other tissues of the legs. PAD can make it hard for you to walk. It can also lead to tissue death. Sometimes part of the leg must be removed by surgery (amputation).    What is a PAD walking program?    A Peripheral Arterial Disease (PAD) walking program is a structured exercise regimen designed to benefit individuals with PAD. PAD is a condition characterized by reduced blood flow to the limbs, typically the legs, due to the narrowing or blockage of arteries. The walking program aims to improve circulation, reduce symptoms, and enhance overall cardiovascular health in individuals with PAD.    How do I start?     Here are some guidelines to help you commence your walking routine:  Perform a daily examination of your feet for redness and sores.  Ensure you wear well-fitting and supportive shoes and socks.  Aim to walk a minimum of three times a week. This is the most important part of the program: consistency is key!  Avoid missing more than two consecutive days, as it can diminish the benefits of the program.  Choose a suitable walking location, whether it's outdoors or on a treadmill indoors.  Keep a record of the minutes you spend walking on a calendar or a tracking tool.  Invite a friend to join you for walks to provide accountability.  Enjoy the process and reward yourself for your achievements!    What steps should I follow for my walking program?     Follow these step-by-step instructions for your walks:    Warm-up: Begin by walking slowly for two to three minutes to loosen up your legs. Stretch your calf and thigh muscles in each leg for 10-15 seconds.    Get Walking!: Walk at a moderate pace for 3-10 minutes until  "your leg pain reaches a 3 or 4 on a 5-point scale. The pain may manifest as a \"Thomas horse,\" cramp, or tightness in your calf or thigh, accompanied by tiredness or fatigue.    Stop and Rest: Allow your pain to improve, which may take up to 3-10 minutes.    Repeat Steps 2 and 3: After the pain subsides, repeat the walking and resting steps several times. Although there may be discomfort, aim to walk fast enough to feel mild to moderate leg pain. Your goal is to reach a moderate level of leg pain before stopping.    Cool Down: Walk slowly for 5 minutes and take a few minutes to stretch your calf and thigh muscles for 10-15 seconds.    Maintain Consistency: Strive to complete the walking program 3-5 times a week. Gradually work towards achieving at least 30 minutes of walking per session. As walking becomes easier, increase the difficulty level by walking uphill, climbing stairs, or adjusting the incline on your treadmill.    Have fun!: Bring enjoyment into your routine! Listen to music or podcasts as you walk and during your rest breaks. Explore your favorite park during your walks. Invite a friend to join you for your walks. Incorporate activities that bring you gildardo to enhance your overall exercise experience.     Using a tracking tool:    You should use a tracking tool to keep track of your progress. Ideally, you should be able to walk for longer distances with less pain the longer you stick with a walking program! Keeping track of your walks will help you see your progress over time. Please bring your tracking tool to your next appointment with your vascular provider so that we can see how you are responding to the exercise program. Below is an example of a tracking tool, but you may create any tracker that you like!     Date Distance walked Exercise time How did it feel?   12/21/23    3 blocks 30 minutes Pain started at a 4/5 but got easier towards the end of the session   12/23/23   1 mile (on treadmill) 35 " minutes Pain 3/5, taking frequent breaks, overall felt good!

## 2024-01-09 NOTE — PROGRESS NOTES
"VASCULAR MEDICINE CONSULT NOTE          LOCATION:  [facility]        Date of Service: 1/5/2024      Primary Care Provider: Racheal Swift  Referring provider;  No ref. provider found      Reason for the visit/chief complaint:   Peripheral arterial disease      HPI:  Amos Walker is a pleasant 78 year old male who presents to our our Vascular Medicine clinic who presents to our clinic with the above-mentioned complaint.    Mr. Walker has medical history significant for type 2 diabetes mellitus, tobacco smoking, hypertension, dyslipidemia, CAD s/p MARCELL x4 in march 2018, CKD stage 3, venous insufficiency with previous left leg vein stripping and peripheral arterial disease status post previous interventions.    Mr. Walker is a retired [employer] who moved from [place] 8-10 years ago after [facility].  He has stable claudications with fast pace, long distance walking \"although he is unable to tell the exact distance\", this has not been limiting his lifestyle and has not been progressive.  These are mainly on bilateral thighs.  Reports the pain was worse after hip surgery but gradually improved as he increased his activities with PT.  He continues to be able to do his regular activities, grocery shopping, etc. No pain at rest.  No lower extremity wounds.  He is not currently doing any exercising or walking regularly.    With regards to his previous PAD interventions, he was previously following up with Dr. Maharaj and Dr. Lyons in vascular surgery.  He is s/p right femoral endarterectomy, SFA stent and PTA of the popliteal artery on 9/14/2018 for nonhealing right lower extremity ulcer.  Subsequently had left SFA and popliteal artery balloon angioplasty 1/12/2021 with Dr. Maharaj also for left medial malleolus nonhealing ulceration.  No intervention since.    Last follow-up with Dr. Lyons was May 2022.       Most recent CAROL study done last month " 12/18/2023, resting CAROL 0.63 on the right (0.56 last year January 2023) and 0.51 on the left (0.54 last year).      Risk factors:  Long history of tobacco smoking, currently active. Smoked since 20, 2 PPD now down to 10 cig/d.  Type 2 diabetes mellitus: For over 25 years.  This has been well-controlled.  Hemoglobin A1c 6.3%.  Hypertension: Only on lisinopril 2.5 mg in the setting of diabetes  Dyslipidemia: Has been on simvastatin 40 mg for years.  LDL within goal 39.          REVIEW OF SYSTEMS:    A 12 point ROS was reviewed and is negative except what is mentioned in HPI.       Past medical history, surgical history, medications, family history, social history and allergies were reviewed. Pertinent points mentioned under HPI.         OBJECTIVE:    Vital signs:  /72 (BP Location: Left arm, Patient Position: Sitting, Cuff Size: Adult Regular)   Pulse 87   SpO2 100%   Wt Readings from Last 1 Encounters:   12/08/23 175 lb 9.6 oz     There is no height or weight on file to calculate BMI.    Physical exam:  General appearance: Pleasant male in no apparent distress.    HEENT: NC/AT.    Neck: Carotids +2/2 bilaterally without bruits.  No jugular venous distension.   Heart: RRR. Normal S1, S2. No murmur, rub, click, or gallop.   Chest: Clear to auscultation bilaterally.  Abdomen: Soft, nontender, nondistended. No pulsatile mass.  No bruits.   Extremities: No lower extremity swelling.  Unable to feel peripheral pulses on bilateral lower extremities.  Skin: No lower extremity wounds.  Neurological: Alert, awake and oriented         DIAGNOSTIC STUDIES:   Labs and diagnostics reviewed including outside records. Pertinent points are mentioned under HPI and assessment and plan sections.        ASSESSMENT AND PLAN:    Moderate peripheral arterial disease with previous bilateral intervention, stable  Long history of tobacco smoking: Active  Type 2 diabetes mellitus, controlled A1c 6.3%  CAD status post previous MARCELL x  4  Hypertension: Controlled  Dyslipidemia: Controlled  CKD stage III  Venous insufficiency status post remote left lower extremity vein stripping    Mr. Walker has a stable peripheral arterial disease with stable claudication only with fast pace, long distance walking.  He however has been very limited in his walking especially after his recent right hip replacement surgery last summer.  His activity is increasing gradually.  We overall discussed the importance of medical management, risk factor control and walking program.  We discussed that the most important risk factor to control at this point is smoking cessation.  Unfortunately, he continues to smoke approximately 10 cigarettes a day.  We discussed the use of nicotine replacement therapy as well as pharmacological treatment such as varenicline and bupropion.  He was not currently interested.    We also spent some time discussing walking program.  We discussed the data between supervised and home-based walking program.  He would like to start home-based walking program.  He is planning to walk in a local shopping center near his house.  We discussed walking at least 30 minutes, 3 times a week.  We provided him with details about walking program and instructions.    He is appropriately on maximum medical therapy currently maintained on dual antiplatelet therapy with aspirin and clopidogrel.  No bleeding consequences.  He is on simvastatin 40 mg.  Although not high intensity, his LDL has remained below 70 at 39.      Recommendations:  Start home-based walking program.  Instructions and education material provided.  Smoking cessation is of utmost importance.  We did discuss this extensively.  Patient currently defers any nicotine replacement or pharmacological therapy prescription.  Continue current medical therapy with DAPT and statins.  Follow-up in 3 months to assess improvement in claudication symptoms with walking program.  No need to repeat CAROL study prior  to next visit.    It was a pleasure meeting with Mr. Walker in our clinic today.    Fernando Shipley MD  Vascular Medicine  January 5, 2024

## 2024-01-10 ENCOUNTER — PATIENT OUTREACH (OUTPATIENT)
Dept: CARE COORDINATION | Facility: CLINIC | Age: 79
End: 2024-01-10
Payer: COMMERCIAL

## 2024-01-12 NOTE — PROGRESS NOTES
Clinic Care Coordination Contact    Situation: Patient chart reviewed by care coordinator.    Background: Referred by Inpatient SW for Care Transition related to TCU discharge on 7/10/23 (Pt is discharging to TCU today:     [address] [address]   PH: [phone]   Admissions: [phone] ([place])   Fax: [phone]     Will need PCP f/u upon discharge from TCU)    Assessment: Pt is having appropriate follow up with PCP and attending regularly scheduled appointments as recommended.     Plan/Recommendations: RN will close program and do no further outreach at this time.     TONY CHOWDARY RN on 1/12/2024 at 10:48 AM

## 2024-01-15 ENCOUNTER — OFFICE VISIT (OUTPATIENT)
Dept: PODIATRY | Facility: CLINIC | Age: 79
End: 2024-01-15
Payer: COMMERCIAL

## 2024-01-15 DIAGNOSIS — E11.610 CHARCOT FOOT DUE TO DIABETES MELLITUS (H): ICD-10-CM

## 2024-01-15 DIAGNOSIS — E11.51 DIABETES MELLITUS WITH PERIPHERAL VASCULAR DISEASE (H): Primary | ICD-10-CM

## 2024-01-15 DIAGNOSIS — L97.521 SKIN ULCER OF LEFT FOOT, LIMITED TO BREAKDOWN OF SKIN (H): ICD-10-CM

## 2024-01-15 DIAGNOSIS — E11.49 TYPE II OR UNSPECIFIED TYPE DIABETES MELLITUS WITH NEUROLOGICAL MANIFESTATIONS, NOT STATED AS UNCONTROLLED(250.60) (H): ICD-10-CM

## 2024-01-15 DIAGNOSIS — L97.922 SKIN ULCER OF LEFT LOWER LEG WITH FAT LAYER EXPOSED (H): ICD-10-CM

## 2024-01-15 DIAGNOSIS — I87.8 VENOUS STASIS: ICD-10-CM

## 2024-01-15 DIAGNOSIS — S90.511S: ICD-10-CM

## 2024-01-15 PROCEDURE — 99214 OFFICE O/P EST MOD 30 MIN: CPT | Performed by: PODIATRIST
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
## 2024-01-19 ENCOUNTER — DOCUMENTATION ONLY (OUTPATIENT)
Dept: INTERNAL MEDICINE | Facility: CLINIC | Age: 79
End: 2024-01-19
Payer: COMMERCIAL

## 2024-01-21 NOTE — PROGRESS NOTES
Type of Form Received:     Form Received (Date) 1/19/24   Form Filled out Yes 1/31/24   Placed in provider folder Yes

## 2024-01-28 ENCOUNTER — HEALTH MAINTENANCE LETTER (OUTPATIENT)
Age: 79
End: 2024-01-28

## 2024-01-29 ENCOUNTER — OFFICE VISIT (OUTPATIENT)
Dept: PODIATRY | Facility: CLINIC | Age: 79
End: 2024-01-29
Payer: COMMERCIAL

## 2024-01-29 DIAGNOSIS — L84 TYLOMA: ICD-10-CM

## 2024-01-29 DIAGNOSIS — I87.8 VENOUS STASIS: ICD-10-CM

## 2024-01-29 DIAGNOSIS — E11.49 TYPE II OR UNSPECIFIED TYPE DIABETES MELLITUS WITH NEUROLOGICAL MANIFESTATIONS, NOT STATED AS UNCONTROLLED(250.60) (H): ICD-10-CM

## 2024-01-29 DIAGNOSIS — E11.51 DIABETES MELLITUS WITH PERIPHERAL VASCULAR DISEASE (H): Primary | ICD-10-CM

## 2024-01-29 DIAGNOSIS — L97.922 SKIN ULCER OF LEFT LOWER LEG WITH FAT LAYER EXPOSED (H): ICD-10-CM

## 2024-01-29 DIAGNOSIS — S90.821S BLISTER OF RIGHT FOOT, SEQUELA: ICD-10-CM

## 2024-01-29 DIAGNOSIS — E11.610 CHARCOT FOOT DUE TO DIABETES MELLITUS (H): ICD-10-CM

## 2024-01-29 PROCEDURE — 99215 OFFICE O/P EST HI 40 MIN: CPT | Performed by: PODIATRIST
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
## 2024-01-29 NOTE — NURSING NOTE
Aoms Walker's chief complaint for this visit includes:  Chief Complaint   Patient presents with    RECHECK     Leg check, wound cares.      PCP: Racehal Swift    Referring Provider:  No referring provider defined for this encounter.    There were no vitals taken for this visit.  Data Unavailable        Allergies   Allergen Reactions    No Clinical Screening - See Comments      methylisothiazolinone    Seasonal Allergies     Simvastatin Other (See Comments)     Sun sensitivty    Methylisothiazolinone Rash    Neomycin      Wound gets worse    Povidone Iodine Rash         Do you need any medication refills at today's visit?

## 2024-01-30 DIAGNOSIS — M81.0 AGE-RELATED OSTEOPOROSIS WITHOUT CURRENT PATHOLOGICAL FRACTURE: ICD-10-CM

## 2024-01-31 ENCOUNTER — TRANSFERRED RECORDS (OUTPATIENT)
Dept: HEALTH INFORMATION MANAGEMENT | Facility: CLINIC | Age: 79
End: 2024-01-31

## 2024-02-04 NOTE — TELEPHONE ENCOUNTER
alendronate (FOSAMAX) 70 MG tablet 12 tablet 3 5/11/2022    Last Office Visit : 12/8/2023  [facility] Skyla Bolden MD     Future Office visit:  3/1/2024     Routing refill request to provider for review/approval because:  Pt reported not taking this med on 1/5/24.  Overdue for DEXA scan.

## 2024-02-05 RX ORDER — ALENDRONATE SODIUM 70 MG/1
70 TABLET ORAL
Qty: 12 TABLET | OUTPATIENT
Start: 2024-02-05

## 2024-02-06 ENCOUNTER — DOCUMENTATION ONLY (OUTPATIENT)
Dept: INTERNAL MEDICINE | Facility: CLINIC | Age: 79
End: 2024-02-06
Payer: COMMERCIAL

## 2024-02-06 NOTE — TELEPHONE ENCOUNTER
Clinic Care Coordination Contact  [facility]/Voicemail    Clinical Data: Care Coordinator Outreach    Outreach Documentation Number of Outreach Attempt   1/24/2024   1:01 PM 1   2/6/2024  10:26 AM 2       Left message on Mom's voicemail with call back information and requested return call.      Plan: Care Coordinator will send disenrollment letter with care coordinator contact information via [email or web address]. Care Coordinator will do no further outreaches at this time.      [facility]  Community Health Worker  [facility]:  [facility] Care Coordination  [phone]         Financial Counselor Review for Apligraf, Dermagraft or Puraply AM:    Procedure to be performed (include CPT code):Yes Apligraf, NuShield, Puraply    Diagnosis code (include ICD-10 code):L97.512; 250.60; E11.49; E11.51    Coverage and patient financial responsibility information:YES    Does patient need to be contacted by Financial Counselor:YES    Has the patient tried Apligraf, Dermagraft or Puraply before    Note: Do not use abbreviations and route encounter to  podiatry Pool

## 2024-02-06 NOTE — PROGRESS NOTES
Type of Form Received:     Form Received (Date) 2/6/24   Form Filled out Yes, faxed 2/9   Placed in provider folder Yes

## 2024-02-13 ENCOUNTER — OFFICE VISIT (OUTPATIENT)
Dept: PODIATRY | Facility: CLINIC | Age: 79
End: 2024-02-13
Payer: COMMERCIAL

## 2024-02-13 ENCOUNTER — TELEPHONE (OUTPATIENT)
Dept: RESEARCH | Facility: CLINIC | Age: 79
End: 2024-02-13

## 2024-02-13 DIAGNOSIS — E11.51 DIABETES MELLITUS WITH PERIPHERAL VASCULAR DISEASE (H): Primary | ICD-10-CM

## 2024-02-13 DIAGNOSIS — I87.8 VENOUS STASIS: ICD-10-CM

## 2024-02-13 DIAGNOSIS — L97.922 SKIN ULCER OF LEFT LOWER LEG WITH FAT LAYER EXPOSED (H): ICD-10-CM

## 2024-02-13 DIAGNOSIS — E11.610 CHARCOT FOOT DUE TO DIABETES MELLITUS (H): ICD-10-CM

## 2024-02-13 DIAGNOSIS — L97.911 ULCER OF RIGHT LOWER EXTREMITY, LIMITED TO BREAKDOWN OF SKIN (H): ICD-10-CM

## 2024-02-13 DIAGNOSIS — E11.49 TYPE II OR UNSPECIFIED TYPE DIABETES MELLITUS WITH NEUROLOGICAL MANIFESTATIONS, NOT STATED AS UNCONTROLLED(250.60) (H): ICD-10-CM

## 2024-02-13 LAB
ANION GAP SERPL CALCULATED.3IONS-SCNC: 10 MMOL/L (ref 7–15)
BASOPHILS # BLD AUTO: 0.1 10E3/UL (ref 0–0.2)
BASOPHILS NFR BLD AUTO: 2 %
BUN SERPL-MCNC: 32.6 MG/DL (ref 8–23)
CALCIUM SERPL-MCNC: 9.3 MG/DL (ref 8.8–10.2)
CHLORIDE SERPL-SCNC: 102 MMOL/L (ref 98–107)
CREAT SERPL-MCNC: 1.32 MG/DL (ref 0.67–1.17)
CRP SERPL-MCNC: 9.85 MG/L
DEPRECATED HCO3 PLAS-SCNC: 26 MMOL/L (ref 22–29)
EGFRCR SERPLBLD CKD-EPI 2021: 56 ML/MIN/1.73M2
EOSINOPHIL # BLD AUTO: 0.2 10E3/UL (ref 0–0.7)
EOSINOPHIL NFR BLD AUTO: 5 %
ERYTHROCYTE [DISTWIDTH] IN BLOOD BY AUTOMATED COUNT: 13 % (ref 10–15)
ERYTHROCYTE [SEDIMENTATION RATE] IN BLOOD BY WESTERGREN METHOD: 15 MM/HR (ref 0–20)
GLUCOSE SERPL-MCNC: 81 MG/DL (ref 70–99)
HCT VFR BLD AUTO: 38.5 % (ref 40–53)
HGB BLD-MCNC: 12.4 G/DL (ref 13.3–17.7)
IMM GRANULOCYTES # BLD: 0 10E3/UL
IMM GRANULOCYTES NFR BLD: 1 %
LYMPHOCYTES # BLD AUTO: 1 10E3/UL (ref 0.8–5.3)
LYMPHOCYTES NFR BLD AUTO: 21 %
MCH RBC QN AUTO: 31.6 PG (ref 26.5–33)
MCHC RBC AUTO-ENTMCNC: 32.2 G/DL (ref 31.5–36.5)
MCV RBC AUTO: 98 FL (ref 78–100)
MONOCYTES # BLD AUTO: 0.7 10E3/UL (ref 0–1.3)
MONOCYTES NFR BLD AUTO: 15 %
NEUTROPHILS # BLD AUTO: 2.5 10E3/UL (ref 1.6–8.3)
NEUTROPHILS NFR BLD AUTO: 56 %
NRBC # BLD AUTO: 0 10E3/UL
NRBC BLD AUTO-RTO: 0 /100
PLATELET # BLD AUTO: 178 10E3/UL (ref 150–450)
POTASSIUM SERPL-SCNC: 4.1 MMOL/L (ref 3.4–5.3)
RBC # BLD AUTO: 3.93 10E6/UL (ref 4.4–5.9)
SODIUM SERPL-SCNC: 138 MMOL/L (ref 135–145)
URATE SERPL-MCNC: 6.2 MG/DL (ref 3.4–7)
WBC # BLD AUTO: 4.5 10E3/UL (ref 4–11)

## 2024-02-13 PROCEDURE — 86140 C-REACTIVE PROTEIN: CPT | Performed by: PODIATRIST

## 2024-02-13 PROCEDURE — 84550 ASSAY OF BLOOD/URIC ACID: CPT | Performed by: PODIATRIST

## 2024-02-13 PROCEDURE — 36415 COLL VENOUS BLD VENIPUNCTURE: CPT | Performed by: PODIATRIST

## 2024-02-13 PROCEDURE — 85652 RBC SED RATE AUTOMATED: CPT | Performed by: PODIATRIST

## 2024-02-13 PROCEDURE — 99214 OFFICE O/P EST MOD 30 MIN: CPT | Performed by: PODIATRIST

## 2024-02-13 PROCEDURE — 85025 COMPLETE CBC W/AUTO DIFF WBC: CPT | Performed by: PODIATRIST

## 2024-02-13 PROCEDURE — 80048 BASIC METABOLIC PNL TOTAL CA: CPT | Performed by: PODIATRIST

## 2024-02-13 RX ORDER — LEVOFLOXACIN 750 MG/1
750 TABLET, FILM COATED ORAL DAILY
Qty: 14 TABLET | Refills: 0 | Status: SHIPPED | OUTPATIENT
Start: 2024-02-13 | End: 2024-03-20
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
## 2024-02-13 NOTE — TELEPHONE ENCOUNTER
"    Bondi Appointment Reminder Note    Study Description: This study is designed to assess performance of an investigational Software-based hearing test against traditional Reference pure tone audiometry (PTA) as measured by median absolute deviation (MAD) in participants across a range of hearing ability.    Amos Walker a 78 year old male, was called today to discuss participation in the Bondi study. The following was reviewed with the participant.         Reminders  Please come 10 minutes early for your scheduled appointment time.  You will see the urgent care  when walking into the clinic and please go to your right near optometry entrance.  You may take a seat by the \"[employer] waiting sign\" and a staff member will come out to greet you.  Parking is free.  Appointment should last approximately 3 hours, you may be waiting for periods of time during appointment.   Please bring entertainment such as a book or phone and a snack/drink.  Feel free to eat before your arrival.  Wear comfortable clothing  Wear your hearing aids if applicable    Any questions call [phone].    Address to [facility]  [address] [place] [id number]      Staff left the patient a voice mail reviewing the information above.     Silviano Jimenez    "

## 2024-02-15 ENCOUNTER — OFFICE VISIT (OUTPATIENT)
Dept: AUDIOLOGY | Facility: CLINIC | Age: 79
End: 2024-02-15
Payer: COMMERCIAL

## 2024-02-15 ENCOUNTER — ALLIED HEALTH/NURSE VISIT (OUTPATIENT)
Dept: RESEARCH | Facility: CLINIC | Age: 79
End: 2024-02-15

## 2024-02-15 VITALS
SYSTOLIC BLOOD PRESSURE: 125 MMHG | HEIGHT: 75 IN | BODY MASS INDEX: 21.76 KG/M2 | DIASTOLIC BLOOD PRESSURE: 78 MMHG | OXYGEN SATURATION: 95 % | RESPIRATION RATE: 16 BRPM | WEIGHT: 175 LBS | TEMPERATURE: 98.6 F | HEART RATE: 68 BPM

## 2024-02-15 DIAGNOSIS — Z00.6 EXAMINATION OF PARTICIPANT OR CONTROL IN CLINICAL RESEARCH: Primary | ICD-10-CM

## 2024-02-15 DIAGNOSIS — H90.5 SENSORINEURAL HEARING LOSS: ICD-10-CM

## 2024-02-15 PROCEDURE — 92567 TYMPANOMETRY: CPT | Performed by: AUDIOLOGIST

## 2024-02-15 PROCEDURE — 99207 PR NO CHARGE-RESEARCH SERVICE: CPT | Performed by: AUDIOLOGIST

## 2024-02-15 PROCEDURE — 92553 AUDIOMETRY AIR & BONE: CPT | Performed by: AUDIOLOGIST

## 2024-02-15 PROCEDURE — 99207 PR NO CHARGE NURSE ONLY: CPT

## 2024-02-15 NOTE — PROGRESS NOTES
Betsy Inclusion/Exclusion Criteria:    Study Name: Betsy    : Leo Peters MD      Study Description: This study is designed to assess performance of an investigational Software-based hearing test against traditional Reference pure tone audiometry (PTA) as measured by median absolute deviation (MAD) in participants across a range of hearing ability.     Protocol Version: 3.0 (09-JAN-2024)  Consent Version: 1.0 (07-NOV-2023)    Criteria #  Inclusion Criteria (ALL MUST BE YES)  YES/NO/N/A   1  Age > 18 years  Yes   2  Proficient in written and spoken English, defined by self report Yes   3  Hearing loss < 85 dbHL at all of the following frequencies 250, 500, 1000, 2000, 3000, 4000, 6000, and 8000 Hz in at least one ear at the time of screening No             Criteria # Exclusion Criteria (ALL MUST BE NO) YES/NO/N/A   1  Ear anatomy non-conducive to appropriate wear of headphones  No   2  Active ear disease defined as current infection, swelling, and/or fluid in the ear canal No   3  Cerumen impaction that cannot be removed    No   4  Sudden loss of hearing (in the preceding 90 days of Screening reference PTA or Reference PTA/Software-based hearing test assessed at Testing Visit), defined by self-report No   5 Loud environmental sound exposure without hearing protection (e.g., concert, construction site, fireworks) defined by self-report within 72 hours of Screening reference PTA assessed or Reference PTA/Software-based hearing test assessed at Testing Visit No   6    Tinnitus that impacts one's daily life, defined by self-report No   7  Ear barotrauma that impacts one's hearing (e.g., muffled, clogged, dampened hearing), defined by self-report No   8  Use of cochlear implants No   9  Air-opposite-bone-gap > 40 dbHL at any of the following frequencies: 500, 1000, 2000, 4000 Hz  When analyzing the left ear, the equation is: AC_Left - BC_Right > 40 dB  When analyzing the right ear, the equation  "is:AC_Right - BC_Left > 40 dB  *AC = Air conduction      BC = Bone conduction Yes   10  1kHz repeat measurement deviating from the original 1kHz measurement by > 10 dbHL in > 2 screening reference PTA tests   No   11  Self-reported issues with small or confined spaces such as single-person enclosed littlejohn, and/or claustrophobia No   12  Health technology, audiology, fitness, media outlet employees (or spouse of employees) or employees of /sites contracted to execute this study  No   13    Active treatment, or treatment in the past 6 months, with either a chemotherapeutic drug for cancer, or radiation therapy to the head or neck region No   14    Active treatment, or treatment in the past 6 months, with parenteral aminoglycoside antibiotics  No   15    Per Investigator's discretion, not eligible to enroll or unable to adhere to study procedures No     Patient does not fulfill study inclusion criteria and/or exclusion criteria are found.     Please provide detail if any inclusion criteria is marked \"No\" and/or if any exclusion criteria is marked \"Yes\": Hearing Loss < 85dbHL at multiple frequencies in both ears.  Additionally, difference between Bone Conduction Value on Right and PTA value on Left is >= 40 db HL Screen Fail.    Leo Peters MD    15-FEB-2024    Sara Behmanesh  Clinical Research Coordinator      "

## 2024-02-15 NOTE — PROGRESS NOTES
Late Date Entry Note: subject is [id number] per internal study trackers and source documentation. [id number] S-B      Bondi Study Consent Note    Study Description: This study is designed to assess performance of an investigational Software-based hearing test against traditional Reference pure tone audiometry (PTA) as measured by median absolute deviation (MAD) in participants across a range of hearing ability.    Amos Walker a 78 year old male, was on-site  today to discuss participation in the Bondi Study.       The consent form was reviewed with the patient.     The review of the study included:  Study Purpose    Participant Responsibilities    Study Data and Devices    Benefits and Risks of Participation    Compensation and Costs of Participation    Voluntary Participation    Confidentiality    Injury and Legal Rights      Protocol Version: 3.0     Subject ID: [id number]    The participant was queried in regards to his willingness to continue and his questions were answered to his satisfaction.     The patient has given his agreement to volunteer and participate in the above noted study.     The eConsent and HIPAA form version 1.0 (07-NOV-2023) was signed on  15-FEB-2024 with a Clinical Research Coordinator from the [facility].     A copy of the Bondi consent will be placed in participant's medical record. A copy of the consent form was provided to them today.    Study data is directly entered into [facility] and then [facility] per protocol.     No study procedures were done prior to Amos Walker providing informed consent.       15-FEB-2024    Sara Behmanesh   Clinical Research Coordinator

## 2024-02-15 NOTE — PROGRESS NOTES
"Late Date Entry Note: subject is [id number] per internal study trackers and source documentation. [id number] S-B             Bondi Screening Note    Study Description: This study is designed to assess performance of an investigational Software-based hearing test against traditional Reference pure tone audiometry (PTA) as measured by median absolute deviation (MAD) in participants across a range of hearing ability.           Subject ID: [id number]     SCREENING     Demographic Info  Amos Walker   1945          78 year old  male    Race: White  Ethnicity: Not  or      Medical History:  Has the subject experienced any past and/or concomitant Medical History in the following categories: No  -Ear/Nose/Throat  -Dementia or other neurological condition preventing following instructions    Medications  Has the subject taken either of these medications in the last 6 months?   Parenteral Aminoglycoside Antibiotics: No   (ex. gentamicin, amikacin, tobramycin, neomycin, streptomycin and others)  Chemotherapy and/or radiation to Head and/or Neck: No    Vitals:  Time Subject Sat: 3:12 PM   /78 (BP Location: Right arm, Patient Position: Right side, Cuff Size: Adult Regular)   Pulse 68   Temp 98.6  F (37  C)   Resp 16   Ht 1.892 m (6' 2.5\")   Wt 79.4 kg (175 lb)   SpO2 95%   BMI 22.17 kg/m       Please see provider Physical Exam note for otoscopy and cerumen removal (when applicable) documentation.     Screening Pure Tone Audiometry (PTA)   Test Performed? Yes  Transducer Type: Over the Ear () SN#:[id number]    Please see audiogram for air conduction values and accompanying document for tabular view of data.   PTA #1:   Did the participant pass 1000 Hz QC Check in both ears? Yes     Number of Retests Performed due to 1 kHz Deviation? 0     4PTA Value (dB)   Left Ear 83.00    Right Ear 77.00        Better Ear Binning Category   right Severe to Profound (61 - 85 dB HL)       Bone Conduction    Test " Jany Monreal is a 44 year old female here for a routine eye exam as a new patient.     She reports some trouble with her near vision.  This is with her glasses off; the glasses are working well, but she does rely on them more than she used to.    ROS     Positive for: Eyes    Negative for: Constitutional, Gastrointestinal, Neurological, Skin, Genitourinary, Musculoskeletal, HENT, Endocrine, Cardiovascular, Respiratory, Psychiatric, Allergic/Imm, Heme/Lymph    Last edited by Roxi Downing on 3/13/2019  8:58 AM. (History)           I have reviewed the patient's medications and allergies, past medical, surgical, social and family history, updating these as appropriate.  See Histories section of the EMR for a display of this information.        ASSESSMENT:  1. Hyperopia with astigmatism and presbyopia, bilateral    2.  Eye health otherwise within normal limits.      PLAN:  1.  New glasses RX provided; optional change.  Discussed with her that she will rely on the glasses more & more as she gets older, & need gradual increases in the multifocal strength.  2.  Return for next routine eye exam in 1 year or sooner if needed.  No dilation needed next time.      Stephanie Mariee, OD     Performed? Yes    Please see audiogram for bone conduction values and accompanying document for tabular view of data.    Tympanometry    Test Performed? Yes    Please see audiogram for tympanometry types.    Were any Adverse Events (AEs) experienced?  No  Were there any Protocol Deviations? No    This completes the participant's screening visit. They will not continue in the Bondi Study.       15-FEB-2024  Sara Behmanesh   Clinical Research Coordinator

## 2024-02-15 NOTE — PROGRESS NOTES
"   Bondi Study Physical Exam      Medical History Reviewed? Yes  (Parenteral aminoglycoside antibiotics: gentamicin, amikacin, tobramycin, neomycin, and streptomycin, ect ) No     Do you have tinnitus (ringing in the ears) that impacts your daily life? No  Do you have muffled, clogged, dampened hearing or feel fullness in your ears? No    Physical Examination  For abnormal findings, please evaluate if the finding is Clinically Significant (by 'CS') or Not Clinically Significant (by 'NCS')  General Appearance   Normal  Head and Neck   Normal  Lungs     Normal  Cardiovascular   Normal  Abdomen    Normal  Lymph Nodes   Normal  Skin     Normal  Neurological    Normal      Vitals:    02/15/24 1501   BP: 125/78   BP Location: Right arm   Patient Position: Right side   Cuff Size: Adult Regular   Pulse: 68   Resp: 16   Temp: 98.6  F (37  C)   SpO2: 95%   Weight: 79.4 kg (175 lb)   Height: 1.892 m (6' 2.5\")              Otoscopy    Left Ear Right Ear   Ear Canal  Normal Normal   Abnormal Findings Description  N/A N/A   Finding of Other Description N/A N/A     Was cerumen removal required? No    15-FEB-2024    Joana Haro NP      "

## 2024-02-16 NOTE — PROGRESS NOTES
Bondi Study End Note    Study Description: This study is designed to assess performance of an investigational Software-based hearing test against traditional Reference pure tone audiometry (PTA) as measured by median absolute deviation (MAD) in participants across a range of hearing ability.          Did they Complete the study? No  If no, reason for early discontinuation: Screen Failure     Date of Completion: 15-FEB-2024    Study Exit Visit: Screening Visit     Were any Adverse Events (AEs) experienced?  No  Were there any Protocol Deviations? No     16-FEB-2024    Sara Behmanesh   Clinical Research Coordinator

## 2024-02-19 ENCOUNTER — OFFICE VISIT (OUTPATIENT)
Dept: PODIATRY | Facility: CLINIC | Age: 79
End: 2024-02-19
Payer: COMMERCIAL

## 2024-02-19 DIAGNOSIS — E11.49 TYPE II OR UNSPECIFIED TYPE DIABETES MELLITUS WITH NEUROLOGICAL MANIFESTATIONS, NOT STATED AS UNCONTROLLED(250.60) (H): ICD-10-CM

## 2024-02-19 DIAGNOSIS — L97.911 ULCER OF RIGHT LOWER EXTREMITY, LIMITED TO BREAKDOWN OF SKIN (H): ICD-10-CM

## 2024-02-19 DIAGNOSIS — I87.8 VENOUS STASIS: ICD-10-CM

## 2024-02-19 DIAGNOSIS — E11.51 DIABETES MELLITUS WITH PERIPHERAL VASCULAR DISEASE (H): Primary | ICD-10-CM

## 2024-02-19 DIAGNOSIS — E11.610 CHARCOT FOOT DUE TO DIABETES MELLITUS (H): ICD-10-CM

## 2024-02-19 DIAGNOSIS — L97.922 SKIN ULCER OF LEFT LOWER LEG WITH FAT LAYER EXPOSED (H): ICD-10-CM

## 2024-02-19 PROCEDURE — 99214 OFFICE O/P EST MOD 30 MIN: CPT | Performed by: PODIATRIST

## 2024-02-19 NOTE — NURSING NOTE
Amos Walker's chief complaint for this visit includes:  Chief Complaint   Patient presents with    Consult     Leg recheck and wound care     PCP: Racheal Swift    Referring Provider:  No referring provider defined for this encounter.    There were no vitals taken for this visit.  Data Unavailable     Do you need any medication refills at today's visit? NO    Allergies   Allergen Reactions    No Clinical Screening - See Comments      methylisothiazolinone    Seasonal Allergies     Simvastatin Other (See Comments)     Sun sensitivty    Methylisothiazolinone Rash    Neomycin      Wound gets worse    Povidone Iodine Rash       Chiquis Morris LPN
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
## 2024-02-26 DIAGNOSIS — H43.811 PVD (POSTERIOR VITREOUS DETACHMENT), RIGHT: Primary | ICD-10-CM

## 2024-02-29 ENCOUNTER — OFFICE VISIT (OUTPATIENT)
Dept: PODIATRY | Facility: CLINIC | Age: 79
End: 2024-02-29
Payer: COMMERCIAL

## 2024-02-29 DIAGNOSIS — I87.8 VENOUS STASIS: ICD-10-CM

## 2024-02-29 DIAGNOSIS — E11.610 CHARCOT FOOT DUE TO DIABETES MELLITUS (H): ICD-10-CM

## 2024-02-29 DIAGNOSIS — B35.1 ONYCHOMYCOSIS: ICD-10-CM

## 2024-02-29 DIAGNOSIS — L97.911 ULCER OF RIGHT LOWER EXTREMITY, LIMITED TO BREAKDOWN OF SKIN (H): ICD-10-CM

## 2024-02-29 DIAGNOSIS — L84 TYLOMA: ICD-10-CM

## 2024-02-29 DIAGNOSIS — E11.51 DIABETES MELLITUS WITH PERIPHERAL VASCULAR DISEASE (H): Primary | ICD-10-CM

## 2024-02-29 DIAGNOSIS — E11.49 TYPE II OR UNSPECIFIED TYPE DIABETES MELLITUS WITH NEUROLOGICAL MANIFESTATIONS, NOT STATED AS UNCONTROLLED(250.60) (H): ICD-10-CM

## 2024-02-29 DIAGNOSIS — L97.922 SKIN ULCER OF LEFT LOWER LEG WITH FAT LAYER EXPOSED (H): ICD-10-CM

## 2024-02-29 PROCEDURE — 11055 PARING/CUTG B9 HYPRKER LES 1: CPT | Performed by: PODIATRIST

## 2024-02-29 PROCEDURE — 99214 OFFICE O/P EST MOD 30 MIN: CPT | Mod: 25 | Performed by: PODIATRIST

## 2024-02-29 PROCEDURE — 11720 DEBRIDE NAIL 1-5: CPT | Mod: XS | Performed by: PODIATRIST
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
## 2024-03-01 ENCOUNTER — OFFICE VISIT (OUTPATIENT)
Dept: INTERNAL MEDICINE | Facility: CLINIC | Age: 79
End: 2024-03-01
Payer: COMMERCIAL

## 2024-03-01 VITALS
OXYGEN SATURATION: 100 % | DIASTOLIC BLOOD PRESSURE: 60 MMHG | HEART RATE: 91 BPM | SYSTOLIC BLOOD PRESSURE: 118 MMHG | BODY MASS INDEX: 22.36 KG/M2 | WEIGHT: 176.5 LBS

## 2024-03-01 DIAGNOSIS — Z79.4 TYPE 2 DIABETES MELLITUS WITH DIABETIC PERIPHERAL ANGIOPATHY WITHOUT GANGRENE, WITH LONG-TERM CURRENT USE OF INSULIN (H): ICD-10-CM

## 2024-03-01 DIAGNOSIS — Z12.5 ENCOUNTER FOR SCREENING FOR MALIGNANT NEOPLASM OF PROSTATE: ICD-10-CM

## 2024-03-01 DIAGNOSIS — L98.499 CHRONIC SKIN ULCER, UNSPECIFIED ULCER STAGE (H): ICD-10-CM

## 2024-03-01 DIAGNOSIS — E11.610 CHARCOT FOOT DUE TO DIABETES MELLITUS (H): Primary | ICD-10-CM

## 2024-03-01 DIAGNOSIS — Z29.11 NEED FOR VACCINATION AGAINST RESPIRATORY SYNCYTIAL VIRUS: ICD-10-CM

## 2024-03-01 DIAGNOSIS — I73.9 PAD (PERIPHERAL ARTERY DISEASE) (H): ICD-10-CM

## 2024-03-01 DIAGNOSIS — R35.0 URINARY FREQUENCY: ICD-10-CM

## 2024-03-01 DIAGNOSIS — E11.51 TYPE 2 DIABETES MELLITUS WITH DIABETIC PERIPHERAL ANGIOPATHY WITHOUT GANGRENE, WITH LONG-TERM CURRENT USE OF INSULIN (H): ICD-10-CM

## 2024-03-01 PROCEDURE — 99214 OFFICE O/P EST MOD 30 MIN: CPT | Performed by: INTERNAL MEDICINE

## 2024-03-01 RX ORDER — RESPIRATORY SYNCYTIAL VIRUS VACCINE 120MCG/0.5
0.5 KIT INTRAMUSCULAR ONCE
Qty: 1 EACH | Refills: 0 | Status: CANCELLED | OUTPATIENT
Start: 2024-03-01 | End: 2024-03-01

## 2024-03-01 RX ORDER — FLASH GLUCOSE SENSOR
KIT MISCELLANEOUS
Qty: 6 EACH | Refills: 3 | Status: SHIPPED | OUTPATIENT
Start: 2024-04-01 | End: 2024-04-05

## 2024-03-01 NOTE — PROGRESS NOTES
Assessment & Plan     Amos was seen today for follow up.    Diagnoses and all orders for this visit:    Charcot foot due to diabetes mellitus (H)  -     Orthotics, Mastectomy and Custom Compression Orders    Need for vaccination against respiratory syncytial virus  Advised at pharmacy    Type 2 diabetes mellitus with diabetic peripheral angiopathy without gangrene, with long-term current use of insulin (H)  -     Continuous Blood Gluc Sensor (FREESTYLE LATOYA 14 DAY SENSOR) MISC; REPLACE SENSOR EVERY 14 DAYS  -     **Hemoglobin A1c FUTURE 3mo; Future  -     Orthotics, Mastectomy and Custom Compression Orders    Urinary frequency  -     PSA, screen; Future    Encounter for screening for malignant neoplasm of prostate  -     PSA, screen; Future    PAD (peripheral artery disease) (H24)  -     Orthotics, Mastectomy and Custom Compression Orders    Chronic skin ulcer, unspecified ulcer stage (H)  Patient needs diabetic shoes and inserts for diabetic neuropathy, charcot foot, PAD, nonhealing wounds and rocker bottom feet in order to prevent further injury/ulceration.  -     Orthotics, Mastectomy and Custom Compression Orders              I spent a total of 33 minutes on the day of the visit.   Time spent by me doing chart review, history and exam, documentation and further activities per the note            Return in about 3 months (around 6/1/2024) for with me, in person with A1c lasMaricruz Trinidad is a 78 year old, presenting for the following health issues:  Follow Up        3/1/2024    10:57 AM   Additional Questions   Roomed by YW     History of Present Illness       Reason for visit:  Dermatitis on arms and legs; cracked the front tooth; updates on vascular surgery appointment; medication refill on glucose sensors; due for new orthotics and diabetic shoes; update on PT appointments; discuss Vit B to treat hand tremors    He eats 2-3 servings of fruits and vegetables daily.He consumes 2 sweetened  beverage(s) daily.He exercises with enough effort to increase his heart rate 20 to 29 minutes per day.  He exercises with enough effort to increase his heart rate 7 days per week. He is missing 1 dose(s) of medications per week.  He is not taking prescribed medications regularly due to side effects.     PMH involving tobacco use, DM, HTN, CAD, PAD, Right Charcot foot, diabetic neuropathy, emphysema, tobacco use, anemia of chronic disease, MGUS, osteoporosis.    Was hospitalized this summer from hip fall, s/p R hemiarthroplasty 6/30/23, wants to continue PT at outside facility, recently follow-up with surgeon and rec no changes    He is having some issues with decaying, fractured teeth, was seen in [facility], got clearance with Dr. Small for extractions/partial, he is off alendronate currently    He has a chronic wound on his ankle, seeing Podiatry, recently started levaquin but neglected wound care changes and wound got worse  He also recently saw vascular who rec medical management with smoking cessation and walking program, rec 3 month follow-up     He does take alendronate following hip fracture    He is on lispro and trulicity, 4-8 units TID, he uses freestyle Danny  Stopped lisinopril, has 2.5 mg at home,hesitant to take given low bp at times  Needs new DM shoes and orthotics, wants custom orthotics d/t rocker bottom feet and arch support    Tremors with writing only, wants to keep an eye on it. Defers neurology visit.    Dermatitis on arms/legs, using hydrocortisone with relief    PAD: previous PAD interventions, he was previously following up with Dr. Maharaj and Dr. Lyons in vascular surgery.  He is s/p right femoral endarterectomy, SFA stent and PTA of the popliteal artery on 9/14/2018 for nonhealing right lower extremity ulcer.  Subsequently had left SFA and popliteal artery balloon angioplasty 1/12/2021 with Dr. Maharaj also for left medial malleolus nonhealing ulceration.  No intervention  "since.    Last follow-up with Dr. Lyons was May 2022.      CAD: 2V CAD involving LAD and RCA, s/p DESx4 in 3/18, on DAPT indefinitely given PAD        Routine Health Maintenance  Immunizations:   Most Recent Immunizations   Administered Date(s) Administered    COVID-19,PF,Pfizer 05/06/2021    Influenza (High Dose) 3 valent vaccine 10/04/2019    Influenza (IIV3) PF 11/01/2013    Influenza, Quad, High Dose, Pf, 65yr + 10/05/2020    Pneumo Conj 13-V (2010&after) 11/03/2014    Pneumococcal 23 valent 12/21/2015    TDAP Vaccine (Boostrix) 03/21/2016      Lipids:   Recent Labs   Lab Test 11/06/23  1440 09/08/22  1645   CHOL 115 124   HDL 56 49   LDL 39 49   TRIG 98 129     PSA (50-75 yrs): No results found for: \"PSA\"    DEXA: 7/21 FRAX elevated, multiple fractures, qualifies for treatment, started alendronate 5/22  AAA Screening (65-75 yrs): CT 12/17, negative  Lung Ca Screening (>30 py 55-79 or >20 py 50-79 + RF): 7/20, 7/21, 7/22, due 7/23  Colonoscopy (50-75 yrs): due 6/24, 6/19 Impression:          - The examined portion of the ileum was normal.                        - One 5 mm polyp in the cecum, removed with a cold snare                        and removed using injection-lift and a cold snare.                        Resected and retrieved.                        - One 6 mm polyp in the transverse colon, removed with a                        hot snare and removed using injection-lift and a hot                        snare. Resected and retrieved. Clip was placed.                        - One 5 mm polyp in the sigmoid colon, removed with a                        hot snare. Resected and retrieved.                        - The examination was otherwise normal on direct and                        retroflexion views.                        NOTE: the polyp reported as being in the descending                        colon in the prior colonoscopy report appears on images                        in the transverse colon; that is " where we removed a                        likely SSA. The descending colon was inspected on                        multiple occasions and no polyps were identified.   Recommendation:      - Return to referring physician.                        - Repeat colonoscopy in 5 years for surveillance.                                                                           HIV/HCV if risk factors: nonreactive HepC 6/16  Safety/Lifestyle: reviewed  Tob/EtOH: declines quitting  Depression: PHQ-2 Score:         1/3/2024     3:25 PM 7/19/2023    12:21 PM   PHQ-2 ( 1999 Pfizer)   Q1: Little interest or pleasure in doing things 1 1   Q2: Feeling down, depressed or hopeless 1 0   PHQ-2 Score 2 1                                         Objective    /60 (BP Location: Right arm, Patient Position: Sitting, Cuff Size: Adult Regular)   Pulse 91   Wt 80.1 kg (176 lb 8 oz)   SpO2 100%   BMI 22.36 kg/m    Body mass index is 22.36 kg/m .  Physical Exam               Signed Electronically by: Racheal Swift MD

## 2024-03-04 ENCOUNTER — OFFICE VISIT (OUTPATIENT)
Dept: PODIATRY | Facility: CLINIC | Age: 79
End: 2024-03-04
Payer: COMMERCIAL

## 2024-03-04 DIAGNOSIS — I87.8 VENOUS STASIS: ICD-10-CM

## 2024-03-04 DIAGNOSIS — E11.51 DIABETES MELLITUS WITH PERIPHERAL VASCULAR DISEASE (H): Primary | ICD-10-CM

## 2024-03-04 DIAGNOSIS — L97.922 SKIN ULCER OF LEFT LOWER LEG WITH FAT LAYER EXPOSED (H): ICD-10-CM

## 2024-03-04 DIAGNOSIS — E11.49 TYPE II OR UNSPECIFIED TYPE DIABETES MELLITUS WITH NEUROLOGICAL MANIFESTATIONS, NOT STATED AS UNCONTROLLED(250.60) (H): ICD-10-CM

## 2024-03-04 DIAGNOSIS — E11.610 CHARCOT FOOT DUE TO DIABETES MELLITUS (H): ICD-10-CM

## 2024-03-04 PROCEDURE — 99214 OFFICE O/P EST MOD 30 MIN: CPT | Performed by: PODIATRIST

## 2024-03-04 NOTE — PROGRESS NOTES
[employer] Study Consent Addendum Note    Study Description: This study is designed to assess performance of an investigational Software-based hearing test against traditional Reference pure tone audiometry (PTA) as measured by median absolute deviation (MAD) in participants across a range of hearing ability.    Subject ID: [id number]    Previous study documentation stated the subject above was consented with consent form Version 1.0 with a version date of 07-NOV-2023. Upon review this is incorrect. Subject was consented with consent form Version 2.0 with a version date of 07-NOV-2023.  The subject was consented with the correct consent version, it was however documented incorrectly.     Additionally, the incorrect consent version was associated with the inclusion/exclusion criteria assessment. Once again, the subject's eligibility criteria were assessed by the contemporaneous version of the criteria but the version was incorrectly documented.     Please allow this note to reflect the updated information; the subject was consented with eConsent and HIPAA form version 2.0 (07-NOV-2023) at the date and time previously indicated.     No study procedures were done prior to Amos Walker providing informed consent.       4-MAR-2024    Radha Masters
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
## 2024-03-06 ENCOUNTER — DOCUMENTATION ONLY (OUTPATIENT)
Dept: INTERNAL MEDICINE | Facility: CLINIC | Age: 79
End: 2024-03-06
Payer: COMMERCIAL

## 2024-03-06 NOTE — PROGRESS NOTES
Type of Form Received:     Form Received (Date) 3/6/24   Form Filled out Yes, faxed 3/13   Placed in provider folder Yes

## 2024-03-08 ENCOUNTER — TELEPHONE (OUTPATIENT)
Dept: INTERNAL MEDICINE | Facility: CLINIC | Age: 79
End: 2024-03-08
Payer: COMMERCIAL

## 2024-03-08 NOTE — TELEPHONE ENCOUNTER
Pt agrees to clinic visit w PCP for 3 month follow up on 6/14/24- pt will call to schedule A1C at local lab location

## 2024-03-11 ENCOUNTER — OFFICE VISIT (OUTPATIENT)
Dept: OPHTHALMOLOGY | Facility: CLINIC | Age: 79
End: 2024-03-11
Payer: COMMERCIAL

## 2024-03-11 DIAGNOSIS — H43.811 PVD (POSTERIOR VITREOUS DETACHMENT), RIGHT: ICD-10-CM

## 2024-03-11 PROCEDURE — 92015 DETERMINE REFRACTIVE STATE: CPT

## 2024-03-11 PROCEDURE — 92134 CPTRZ OPH DX IMG PST SGM RTA: CPT

## 2024-03-11 PROCEDURE — G0463 HOSPITAL OUTPT CLINIC VISIT: HCPCS

## 2024-03-11 PROCEDURE — 99213 OFFICE O/P EST LOW 20 MIN: CPT

## 2024-03-11 ASSESSMENT — VISUAL ACUITY
METHOD: SNELLEN - LINEAR
OD_SC: 20/20 SLOW
OS_SC+: -2
OS_SC: 20/30
OD_SC+: -2

## 2024-03-11 ASSESSMENT — CONF VISUAL FIELD
OS_SUPERIOR_TEMPORAL_RESTRICTION: 0
OD_INFERIOR_NASAL_RESTRICTION: 0
OS_INFERIOR_TEMPORAL_RESTRICTION: 0
OD_SUPERIOR_TEMPORAL_RESTRICTION: 0
OS_NORMAL: 1
METHOD: COUNTING FINGERS
OS_SUPERIOR_NASAL_RESTRICTION: 0
OD_SUPERIOR_NASAL_RESTRICTION: 0
OD_NORMAL: 1
OS_INFERIOR_NASAL_RESTRICTION: 0
OD_INFERIOR_TEMPORAL_RESTRICTION: 0

## 2024-03-11 ASSESSMENT — REFRACTION_MANIFEST
OD_CYLINDER: +1.00
OD_ADD: +2.75
OS_AXIS: 170
OS_SPHERE: PLANO
OS_ADD: +2.75
OS_CYLINDER: +1.00
OD_SPHERE: PLANO
OD_AXIS: 007

## 2024-03-11 ASSESSMENT — SLIT LAMP EXAM - LIDS
COMMENTS: NORMAL
COMMENTS: NORMAL

## 2024-03-11 ASSESSMENT — CUP TO DISC RATIO
OD_RATIO: 0.35
OS_RATIO: 0.35

## 2024-03-11 ASSESSMENT — TONOMETRY
OS_IOP_MMHG: 21
IOP_METHOD: TONOPEN
OD_IOP_MMHG: 17

## 2024-03-11 ASSESSMENT — REFRACTION_WEARINGRX
OS_CYLINDER: +1.00
OD_SPHERE: PLANO
OS_ADD: +2.75
OD_ADD: +2.75
OD_CYLINDER: SPHERE
OS_SPHERE: PLANO
OS_AXIS: 170

## 2024-03-11 ASSESSMENT — EXTERNAL EXAM - RIGHT EYE: OD_EXAM: NORMAL

## 2024-03-11 ASSESSMENT — EXTERNAL EXAM - LEFT EYE: OS_EXAM: NORMAL

## 2024-03-11 NOTE — NURSING NOTE
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     Patient notes he loses focus when looking to the left, and sometimes when looking to the right. Denies changes in the last year. \"I use rewetting drops on occasion when my eyes feel dry.\"      Chief Complaint(s) and History of Present Illness(es)       Diabetic Retinopathy Follow Up              Laterality: both eyes    Onset: years ago    Course: stable    Associated symptoms: dryness (occasionally), flashes (\"only when blood sugar level drops\"; \"speckles of light when looking up at the angel\") and floaters (occasionally; \"I ignore them\").  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     Patient notes he loses focus when looking to the left, and sometimes when looking to the right. Denies changes in the last year. \"I use rewetting drops on occasion when my eyes feel dry.\"               Comments    DM2  Last BS: 130 at 2:41pm on 3/11/24  Lab Results       Component                Value               Date                       A1C                      6.3                 11/06/2023                 A1C                      6.1                 04/04/2023                 A1C                      6.3                 09/08/2022                 A1C                      6.1                 01/10/2022                 A1C                      6.4                 08/16/2021                 A1C                      6.0                 01/12/2021                 A1C                      5.8                 09/02/2020                 A1C                      5.8                 12/20/2019                 A1C                      5.6                 10/04/2019              Jess Wakefield, COT 2:42 PM 03/11/2024                     "

## 2024-03-11 NOTE — PROGRESS NOTES
CC: Diabetic retinopathy    HPI: Ms Walker is here for yearly eye exam, he notices slight deterioration in vision since last time he was here. His brother passed away yesterday    PMHx:   DM type II  PAD    Imaging:    OCT: 03/11/2024  Right eye: Good foveal contour, no IRF/SRF   Left eye: Good foveal contour, no IRF/SRF     Retina Laser procedures:  none    Intravitreal injections:  none    Assessment/ Plan: 03/11/2024       #ASSESSMENT/PLAN  1. T2DM w/ mild NPDR OU   - Blood pressure (<120/80) and blood glucose (HbA1c <7.0) control discussed with patient. Patient advised that failure to adequately control each may lead to vision loss. The patient expressed understanding.     Lab Results   Component Value Date    A1C 6.3 11/06/2023   Vision today is stable today  OCT shows no DME   Recommend yearly follow up, earlier if any symptoms arise.    # PVD both eyes   Retinal detachment/retinal tear precautions discussed with patient previously  Contact clinic immediately for new floaters/flashers or shadows in vision     # Refractive error OU  - Presbyopia OU, astigmatism OS  - Discussed and dispensed MRX toady     # Epiretinal membrane both eyes   - seen on Optical Coherence Tomography Scan   -stable, observe     return to clinic: 12 months dilated fundus exam, V,T,D OCT macula OU       Razia Sams MD     Medical Retina  [facility]     Attending Physician Attestation:  Complete documentation of historical and exam elements from today's encounter can be found in the full encounter summary report (not reduplicated in this progress note).  I personally obtained the chief complaint(s) and history of present illness.  I confirmed and edited as necessary the review of systems, past medical/surgical history, family history, social history, and examination findings as documented by others; and I examined the patient myself.  I personally reviewed the relevant tests, images, and reports as  documented above.  I formulated and edited as necessary the assessment and plan and discussed the findings and management plan with the patient and family. Razia Sams MD

## 2024-03-13 ENCOUNTER — MYC MEDICAL ADVICE (OUTPATIENT)
Dept: PODIATRY | Facility: CLINIC | Age: 79
End: 2024-03-13

## 2024-03-20 ENCOUNTER — OFFICE VISIT (OUTPATIENT)
Dept: PODIATRY | Facility: CLINIC | Age: 79
End: 2024-03-20
Payer: COMMERCIAL

## 2024-03-20 DIAGNOSIS — E11.49 TYPE II OR UNSPECIFIED TYPE DIABETES MELLITUS WITH NEUROLOGICAL MANIFESTATIONS, NOT STATED AS UNCONTROLLED(250.60) (H): ICD-10-CM

## 2024-03-20 DIAGNOSIS — L97.911 ULCER OF RIGHT LOWER EXTREMITY, LIMITED TO BREAKDOWN OF SKIN (H): ICD-10-CM

## 2024-03-20 DIAGNOSIS — E11.51 DIABETES MELLITUS WITH PERIPHERAL VASCULAR DISEASE (H): ICD-10-CM

## 2024-03-20 DIAGNOSIS — L97.922 SKIN ULCER OF LEFT LOWER LEG WITH FAT LAYER EXPOSED (H): ICD-10-CM

## 2024-03-20 DIAGNOSIS — I87.8 VENOUS STASIS: ICD-10-CM

## 2024-03-20 DIAGNOSIS — E11.610 CHARCOT FOOT DUE TO DIABETES MELLITUS (H): ICD-10-CM

## 2024-03-20 PROCEDURE — 11056 PARNG/CUTG B9 HYPRKR LES 2-4: CPT | Performed by: PODIATRIST

## 2024-03-20 PROCEDURE — 99214 OFFICE O/P EST MOD 30 MIN: CPT | Mod: 25 | Performed by: PODIATRIST

## 2024-03-20 RX ORDER — LEVOFLOXACIN 750 MG/1
750 TABLET, FILM COATED ORAL DAILY
Qty: 14 TABLET | Refills: 0 | Status: SHIPPED | OUTPATIENT
Start: 2024-03-20

## 2024-03-20 ASSESSMENT — PAIN SCALES - GENERAL: PAINLEVEL: NO PAIN (0)
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
## 2024-03-20 NOTE — NURSING NOTE
Amos Walker's chief complaint for this visit includes:  Chief Complaint   Patient presents with    Left Foot - WOUND CARE, Follow Up    Right Foot - WOUND CARE, Follow Up     PCP: Racheal Swift    Referring Provider:  No referring provider defined for this encounter.    There were no vitals taken for this visit.  No Pain (0)     Do you need any medication refills at today's visit? NO    Allergies   Allergen Reactions    No Clinical Screening - See Comments      methylisothiazolinone    Seasonal Allergies     Simvastatin Other (See Comments)     Sun sensitivty    Methylisothiazolinone Rash    Neomycin      Wound gets worse    Povidone Iodine Rash       Conrado Ashby, EMT
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
## 2024-03-29 DIAGNOSIS — E11.40 TYPE 2 DIABETES, CONTROLLED, WITH NEUROPATHY (H): ICD-10-CM

## 2024-03-29 DIAGNOSIS — I87.8 VENOUS STASIS: ICD-10-CM

## 2024-03-29 RX ORDER — AMMONIUM LACTATE 12 G/100G
CREAM TOPICAL
Qty: 385 G | Refills: 3 | Status: SHIPPED | OUTPATIENT
Start: 2024-03-29

## 2024-03-29 RX ORDER — TRIAMCINOLONE ACETONIDE 1 MG/G
CREAM TOPICAL
Qty: 45 G | Refills: 1 | Status: SHIPPED | OUTPATIENT
Start: 2024-03-29

## 2024-03-29 NOTE — TELEPHONE ENCOUNTER
Prescription refill requested for:   ammonium lactate (LAC-HYDRIN) 12 % external cream       Last Written Prescription Date:  2/16/23  Last Fill Quantity: 385 g,   # refills: 3  Last Office Visit: 3/20/24  Future Office visit:           Prescription refill requested for:   triamcinolone (KENALOG) 0.1 % external cream       Last Written Prescription Date:  5/10/22  Last Fill Quantity: 45 g,   # refills: 1  Last Office Visit: 3/20/24  Future Office visit:           Chiquis Morris LPN

## 2024-04-01 ENCOUNTER — TELEPHONE (OUTPATIENT)
Dept: VASCULAR SURGERY | Facility: CLINIC | Age: 79
End: 2024-04-01

## 2024-04-01 ENCOUNTER — OFFICE VISIT (OUTPATIENT)
Dept: PODIATRY | Facility: CLINIC | Age: 79
End: 2024-04-01
Payer: COMMERCIAL

## 2024-04-01 DIAGNOSIS — E11.610 CHARCOT FOOT DUE TO DIABETES MELLITUS (H): ICD-10-CM

## 2024-04-01 DIAGNOSIS — L97.922 SKIN ULCER OF LEFT LOWER LEG WITH FAT LAYER EXPOSED (H): ICD-10-CM

## 2024-04-01 DIAGNOSIS — I87.8 VENOUS STASIS: Primary | ICD-10-CM

## 2024-04-01 DIAGNOSIS — E11.49 TYPE II OR UNSPECIFIED TYPE DIABETES MELLITUS WITH NEUROLOGICAL MANIFESTATIONS, NOT STATED AS UNCONTROLLED(250.60) (H): ICD-10-CM

## 2024-04-01 DIAGNOSIS — E11.51 DIABETES MELLITUS WITH PERIPHERAL VASCULAR DISEASE (H): ICD-10-CM

## 2024-04-01 PROCEDURE — 99214 OFFICE O/P EST MOD 30 MIN: CPT | Performed by: PODIATRIST
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
## 2024-04-01 NOTE — TELEPHONE ENCOUNTER
Received call from Pt. He stated his brother passed about a month ago and he has not been vigilantly walking. He requested to reschedule F/U to allow him time to reestablish his walking routine. Rescheduled visit at his request. Confirmed new appt details. Pt verbalized understanding, agreed to current plan and denied any further questions.    Vickie Colon LPN

## 2024-04-01 NOTE — NURSING NOTE
Amos Walker's chief complaint for this visit includes:  Chief Complaint   Patient presents with    Consult     Leg recheck     PCP: Racheal Swift    Referring Provider:  No referring provider defined for this encounter.    There were no vitals taken for this visit.  Data Unavailable     Do you need any medication refills at today's visit? NO    Allergies   Allergen Reactions    No Clinical Screening - See Comments      methylisothiazolinone    Seasonal Allergies     Simvastatin Other (See Comments)     Sun sensitivty    Methylisothiazolinone Rash    Neomycin      Wound gets worse    Povidone Iodine Rash       Chiquis Morris LPN
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
## 2024-04-04 ENCOUNTER — MYC MEDICAL ADVICE (OUTPATIENT)
Dept: INTERNAL MEDICINE | Facility: CLINIC | Age: 79
End: 2024-04-04
Payer: COMMERCIAL

## 2024-04-04 DIAGNOSIS — Z79.4 TYPE 2 DIABETES MELLITUS WITH DIABETIC PERIPHERAL ANGIOPATHY WITHOUT GANGRENE, WITH LONG-TERM CURRENT USE OF INSULIN (H): ICD-10-CM

## 2024-04-04 DIAGNOSIS — E11.51 TYPE 2 DIABETES MELLITUS WITH DIABETIC PERIPHERAL ANGIOPATHY WITHOUT GANGRENE, WITH LONG-TERM CURRENT USE OF INSULIN (H): ICD-10-CM

## 2024-04-04 RX ORDER — PEN NEEDLE, DIABETIC 31 GX5/16"
NEEDLE, DISPOSABLE MISCELLANEOUS
Qty: 400 EACH | Refills: 1 | Status: SHIPPED | OUTPATIENT
Start: 2024-04-04

## 2024-04-04 NOTE — TELEPHONE ENCOUNTER
insulin pen needle (B-D U/F) 31G X 8 MM miscellaneous 400 each 2 12/8/2022     ----------------------  Last Office Visit : 3/1/2024  [facility] Internal Medicine [place]     Racheal Swift MD     Future Office visit:    6/14/2024 Status: [place]    Arrive By: 9:45 AM     Appointment Time: 10:00 AM Length: 30   Visit Type: UMP RETURN [[id number]] JOSE:     Provider: Racheal Swift MD Department: [facility] INTERNAL MEDICINE     ----------------------    Refill decision:Refilled 400 units + 1 refill      Pass/Fail Protocol Criteria:  Diabetic Supplies Protocol [id number]/04/2024 04:48 PM   Protocol Details Medication is active on med list    Medication indicated for associated diagnosis    Patient is 18 years of age or older    Recent (6 mo) or future (30 days) visit within the authorizing provider's specialty

## 2024-04-05 RX ORDER — FLASH GLUCOSE SENSOR
KIT MISCELLANEOUS
Qty: 6 EACH | Refills: 3 | Status: SHIPPED | OUTPATIENT
Start: 2024-04-05 | End: 2024-05-09

## 2024-04-05 NOTE — TELEPHONE ENCOUNTER
rx available  insulin pen needle (B-D U/F) 31G X 8 MM miscellaneous   400 each 1 4/4/2024 -- No  Sig: USE AS DIRECTED FOUR TIMES DAILY  E-Prescribing Status: Receipt confirmed by pharmacy (4/4/2024  4:50 PM CDT)    -------------  Continuous Blood Gluc Sensor (FREESTYLE LATOYA 14 DAY SENSOR) MISC   6 each 3 4/1/2024 -- No  Sig: REPLACE SENSOR EVERY 14 DAYS  E-Prescribing Status: Receipt confirmed by pharmacy (3/1/2024 11:50 AM CST)    resent this rx as confirm date listed as 3-1-24 ??

## 2024-04-08 ENCOUNTER — TELEPHONE (OUTPATIENT)
Dept: AUDIOLOGY | Facility: CLINIC | Age: 79
End: 2024-04-08
Payer: COMMERCIAL

## 2024-04-08 ENCOUNTER — DOCUMENTATION ONLY (OUTPATIENT)
Dept: INTERNAL MEDICINE | Facility: CLINIC | Age: 79
End: 2024-04-08
Payer: COMMERCIAL

## 2024-04-08 NOTE — PROGRESS NOTES
Type of Form Received: Order    Form Received (Date) 4/8/24   Form Filled out Yes, faxed 4/22/24   Placed in provider folder Yes

## 2024-04-08 NOTE — TELEPHONE ENCOUNTER
[facility] Call Center    Phone Message    May a detailed message be left on voicemail: yes     Reason for Call: Other: Pt would like a call to discuss problems he has been having with his Hearing aids. Please call. Thanks.     Action Taken: Other: AUDIO    Travel Screening: Not Applicable

## 2024-04-08 NOTE — TELEPHONE ENCOUNTER
Attempted to contact patient to reschedule, no answer, voicemail left.     Patient's appointment on 4/10 will need to be rescheduled. Offered 4/17 @ 8AM or 9:30AM. These spots held     Tierra Baker, [facility]  Licensed Audiologist  [place] #[id number]      04/08/24

## 2024-04-09 NOTE — TELEPHONE ENCOUNTER
Returned patient's phone call. No answer, voicemail left. Patient is scheduled for a hearing aid check on 4/17    Tierra Baker, [facility]  Licensed Audiologist  [place] #[id number]      04/09/24

## 2024-04-11 ENCOUNTER — DOCUMENTATION ONLY (OUTPATIENT)
Dept: INTERNAL MEDICINE | Facility: CLINIC | Age: 79
End: 2024-04-11
Payer: COMMERCIAL

## 2024-04-12 ENCOUNTER — DOCUMENTATION ONLY (OUTPATIENT)
Dept: INTERNAL MEDICINE | Facility: CLINIC | Age: 79
End: 2024-04-12
Payer: COMMERCIAL

## 2024-04-12 ENCOUNTER — TRANSFERRED RECORDS (OUTPATIENT)
Dept: HEALTH INFORMATION MANAGEMENT | Facility: CLINIC | Age: 79
End: 2024-04-12
Payer: COMMERCIAL

## 2024-04-12 NOTE — PROGRESS NOTES
Type of Form Received: Plan of care X3    Form Received (Date) 4/11/24   Form Filled out Yes, faxed 4/22/24   Placed in provider folder Yes

## 2024-04-15 ENCOUNTER — OFFICE VISIT (OUTPATIENT)
Dept: PODIATRY | Facility: CLINIC | Age: 79
End: 2024-04-15
Payer: COMMERCIAL

## 2024-04-15 DIAGNOSIS — E11.51 DIABETES MELLITUS WITH PERIPHERAL VASCULAR DISEASE (H): Primary | ICD-10-CM

## 2024-04-15 DIAGNOSIS — L97.922 SKIN ULCER OF LEFT LOWER LEG WITH FAT LAYER EXPOSED (H): ICD-10-CM

## 2024-04-15 DIAGNOSIS — E11.49 TYPE II OR UNSPECIFIED TYPE DIABETES MELLITUS WITH NEUROLOGICAL MANIFESTATIONS, NOT STATED AS UNCONTROLLED(250.60) (H): ICD-10-CM

## 2024-04-15 DIAGNOSIS — E11.610 CHARCOT FOOT DUE TO DIABETES MELLITUS (H): ICD-10-CM

## 2024-04-15 DIAGNOSIS — B35.1 ONYCHOMYCOSIS: ICD-10-CM

## 2024-04-15 PROCEDURE — 11720 DEBRIDE NAIL 1-5: CPT | Performed by: PODIATRIST

## 2024-04-15 PROCEDURE — 99214 OFFICE O/P EST MOD 30 MIN: CPT | Mod: 25 | Performed by: PODIATRIST
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
## 2024-04-15 NOTE — PROGRESS NOTES
Type of Form Received:     Form Received (Date) 4/12/24   Form Filled out Yes, faxed 5/22/24   Placed in provider folder Yes   Re-faxed 5/29, Re-faxed 6/11

## 2024-04-17 ENCOUNTER — OFFICE VISIT (OUTPATIENT)
Dept: AUDIOLOGY | Facility: CLINIC | Age: 79
End: 2024-04-17
Payer: COMMERCIAL

## 2024-04-17 DIAGNOSIS — H90.3 BILATERAL SENSORINEURAL HEARING LOSS: Primary | ICD-10-CM

## 2024-04-17 PROCEDURE — V5299 HEARING SERVICE: HCPCS

## 2024-04-17 NOTE — PROGRESS NOTES
AUDIOLOGY REPORT: HEARING AID RECHECK    SUBJECTIVE: Amos Walker is a 78 year old male, :  1945, was seen in the Audiology Clinic at [facility] on 24 for a return check of their hearing aids.       Background:   Patient is here today with the complaint of right hearing aid causing distortion.    Procedures:       SIDE: Both    : [employer]    TYPE: L50-RT MOIRA    S/N: Right: [id number]; Left: [id number]     WARRANTY: 2026    Both hearing aids were cleaned and updated to latest firmware within [employer] software. Attempted to solve the distortion issue, however, patient reports issue still ongoing. Amos would like to have the  replaced. A new /chsell will need to be ordered    Plan:   Once the right cshell has arrived, please call patient for pick-up. He may need assistance re-coupling to hearing aid. Patient will return as needed for hearing aid concerns.     NO CHARGE VISIT    Tierra Baker, [facility]  Licensed Audiologist  [place] #[id number]  2024

## 2024-04-23 DIAGNOSIS — E11.40 TYPE 2 DIABETES, CONTROLLED, WITH NEUROPATHY (H): ICD-10-CM

## 2024-04-26 NOTE — TELEPHONE ENCOUNTER
dulaglutide (TRULICITY) 1.5 MG/0.5ML pen       Last Written Prescription Date:  4-19-23  Last Fill Quantity: 6-ml,   # refills: 3  Last Office Visit : 3-1-24  Future Office visit:  6-14-24 2-13-24   GFR Estimate  >60 mL/min/1.73m2 56 Low        Routing refill request to provider for review/approval because:  ABN GFR

## 2024-04-29 ENCOUNTER — TELEPHONE (OUTPATIENT)
Dept: AUDIOLOGY | Facility: CLINIC | Age: 79
End: 2024-04-29

## 2024-04-29 NOTE — TELEPHONE ENCOUNTER
Right earmold arrived from . SN and warranty have remained the same    Patient called for pick-up; voicemail left    NO CHARGE    Tierra Baker, [facility]  Licensed Audiologist  [place] #[id number]      04/29/24

## 2024-04-30 ENCOUNTER — MYC MEDICAL ADVICE (OUTPATIENT)
Dept: PODIATRY | Facility: CLINIC | Age: 79
End: 2024-04-30
Payer: COMMERCIAL

## 2024-04-30 RX ORDER — DULAGLUTIDE 1.5 MG/.5ML
1.5 INJECTION, SOLUTION SUBCUTANEOUS
Qty: 6 ML | Refills: 1 | Status: SHIPPED | OUTPATIENT
Start: 2024-04-30 | End: 2024-06-14

## 2024-05-01 ENCOUNTER — MYC REFILL (OUTPATIENT)
Dept: INTERNAL MEDICINE | Facility: CLINIC | Age: 79
End: 2024-05-01
Payer: COMMERCIAL

## 2024-05-01 DIAGNOSIS — E11.40 TYPE 2 DIABETES, CONTROLLED, WITH NEUROPATHY (H): ICD-10-CM

## 2024-05-01 DIAGNOSIS — Z79.4 TYPE 2 DIABETES MELLITUS WITH DIABETIC PERIPHERAL ANGIOPATHY WITHOUT GANGRENE, WITH LONG-TERM CURRENT USE OF INSULIN (H): ICD-10-CM

## 2024-05-01 DIAGNOSIS — E11.51 TYPE 2 DIABETES MELLITUS WITH DIABETIC PERIPHERAL ANGIOPATHY WITHOUT GANGRENE, WITH LONG-TERM CURRENT USE OF INSULIN (H): ICD-10-CM

## 2024-05-02 ENCOUNTER — ALLIED HEALTH/NURSE VISIT (OUTPATIENT)
Dept: AUDIOLOGY | Facility: CLINIC | Age: 79
End: 2024-05-02
Payer: COMMERCIAL

## 2024-05-02 NOTE — PROGRESS NOTES
Patient dropped hearing aid off for assistance coupling new earmold     Hearing aid ready for pick-up    NO CHARGE     Beryl Fenton CCC-A  Licensed Audiologist  [place] #[id number]  May 2, 2024

## 2024-05-09 RX ORDER — FLASH GLUCOSE SENSOR
KIT MISCELLANEOUS
Qty: 6 EACH | Refills: 2 | Status: SHIPPED | OUTPATIENT
Start: 2024-05-09

## 2024-05-09 RX ORDER — DULAGLUTIDE 1.5 MG/.5ML
1.5 INJECTION, SOLUTION SUBCUTANEOUS
Qty: 6 ML | Refills: 1 | OUTPATIENT
Start: 2024-05-09

## 2024-05-09 NOTE — TELEPHONE ENCOUNTER
Last Office Visit : 3-1-24  Future Office visit:  6-14-24  Duplicate >dulaglutide (TRULICITY) 1.5 MG/0.5ML pen6 mL14/30/2024

## 2024-05-15 ENCOUNTER — OFFICE VISIT (OUTPATIENT)
Dept: PODIATRY | Facility: CLINIC | Age: 79
End: 2024-05-15
Payer: COMMERCIAL

## 2024-05-15 DIAGNOSIS — L97.911 ULCER OF RIGHT LOWER EXTREMITY, LIMITED TO BREAKDOWN OF SKIN (H): ICD-10-CM

## 2024-05-15 DIAGNOSIS — E11.610 CHARCOT FOOT DUE TO DIABETES MELLITUS (H): ICD-10-CM

## 2024-05-15 DIAGNOSIS — L97.521 SKIN ULCER OF LEFT FOOT, LIMITED TO BREAKDOWN OF SKIN (H): ICD-10-CM

## 2024-05-15 DIAGNOSIS — I87.8 VENOUS STASIS: ICD-10-CM

## 2024-05-15 DIAGNOSIS — E11.51 DIABETES MELLITUS WITH PERIPHERAL VASCULAR DISEASE (H): Primary | ICD-10-CM

## 2024-05-15 DIAGNOSIS — E11.49 TYPE II OR UNSPECIFIED TYPE DIABETES MELLITUS WITH NEUROLOGICAL MANIFESTATIONS, NOT STATED AS UNCONTROLLED(250.60) (H): ICD-10-CM

## 2024-05-15 PROCEDURE — 99214 OFFICE O/P EST MOD 30 MIN: CPT | Performed by: PODIATRIST
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
## 2024-05-15 NOTE — NURSING NOTE
Amos Walker's chief complaint for this visit includes:  Chief Complaint   Patient presents with    Consult     Foot recheck      PCP: Racheal Swift    Referring Provider:  No referring provider defined for this encounter.    There were no vitals taken for this visit.  Data Unavailable     Do you need any medication refills at today's visit? NO    Allergies   Allergen Reactions    No Clinical Screening - See Comments      methylisothiazolinone    Seasonal Allergies     Simvastatin Other (See Comments)     Sun sensitivty    Methylisothiazolinone Rash    Neomycin      Wound gets worse    Povidone Iodine Rash       Chiquis Morris LPN

## 2024-05-24 ENCOUNTER — OFFICE VISIT (OUTPATIENT)
Dept: VASCULAR SURGERY | Facility: CLINIC | Age: 79
End: 2024-05-24
Payer: COMMERCIAL

## 2024-05-24 VITALS — SYSTOLIC BLOOD PRESSURE: 128 MMHG | OXYGEN SATURATION: 99 % | DIASTOLIC BLOOD PRESSURE: 68 MMHG | HEART RATE: 86 BPM

## 2024-05-24 DIAGNOSIS — I73.9 PERIPHERAL ARTERIAL DISEASE WITH HISTORY OF REVASCULARIZATION (H): Primary | ICD-10-CM

## 2024-05-24 DIAGNOSIS — Z98.890 PERIPHERAL ARTERIAL DISEASE WITH HISTORY OF REVASCULARIZATION (H): Primary | ICD-10-CM

## 2024-05-24 PROCEDURE — 99213 OFFICE O/P EST LOW 20 MIN: CPT | Performed by: HOSPITALIST

## 2024-05-24 NOTE — LETTER
5/24/2024       RE: Amos Walker  [address] [facility] [address]     Dear Colleague,    Thank you for referring your patient, Amos Walker, to the [facility]ASCULAR [facility] [place] [facility]. Please see a copy of my visit note below.    VASCULAR MEDICINE PROGRESS NOTE          LOCATION:  [facility]        Date of Service: 5/24/2024      Primary Care Provider: Racheal Swift      Reason for the visit/chief complaint:   Follow up on PAD      Subjective:  Amos Walker is a pleasant 79 year old male who presents to our Vascular Medicine clinic to follow-up.  I first met with him 1/5/2024.  I refer the reader to my initial consultation note for details.    Briefly, he has medical history significant for type 2 diabetes mellitus, tobacco smoking, hypertension, dyslipidemia, CAD s/p MARCELL x4 in march 2018, CKD stage 3, venous insufficiency with previous left leg vein stripping and peripheral arterial disease status post previous interventions bilaterally due to lower extremity nonhealing ulcers in September 2018 on the right and January 2021 on the left.     Last visit, he was having stable claudication symptoms with no evidence of CLTI.  We encouraged continuing medical management and encouraged walking program.    Today, Mr. Walker reports that he had eventful last few months and was not able to commit to walking.  He unfortunately lost one of his brothers and had to travel to [place].  His other brother on the [facility] is now put in hospice for which he needed to travel to see him as well.  While he was traveling, he always used wheelchair at the airport mainly due to him traveling through big airports that requires miles of walking.  Other than that, he has not had any worsening in his daily activity claudication symptoms that remains only with long distance.    He has been in follow-up with  Dr. Mcclain in podiatry and has had multiple abrasions to his lower extremities.  No significant concern for new arterial nonhealing ulceration.      Past medical history, surgical history, medications, family history, social history and allergies were reviewed. Pertinent points mentioned under HPI.        OBJECTIVE:    Vital signs:  /68 (BP Location: Left arm, Patient Position: Sitting, Cuff Size: Adult Regular)   Pulse 86   SpO2 99%   Wt Readings from Last 1 Encounters:   03/01/24 176 lb 8 oz     There is no height or weight on file to calculate BMI.    Physical exam:  General appearance: Pleasant male in no apparent distress.    HEENT: NC/AT.    Neck: Carotids +2/2 bilaterally without bruits.  No jugular venous distension.   Heart: RRR. Normal S1, S2.   Extremities: Mild pitting edema noted to lower extremities bilaterally. Unable to feel DP and PT bilaterally consistent with prior exam. Monophasic by Doppler.  Skin: Right anterior medial leg and dorsal right hallux abrasions.  Left second toe covered with dressing.  Pictures reviewed from his recent follow-up with podiatry a week ago that seems to be a stable eschar.  Neurological: Alert, awake and oriented           DIAGNOSTIC STUDIES:   Labs and diagnostics reviewed including outside records. Pertinent points are mentioned under HPI and assessment and plan sections.        ASSESSMENT AND PLAN:    Moderate peripheral arterial disease with previous bilateral intervention, stable  Long history of tobacco smoking: Active  Type 2 diabetes mellitus, controlled A1c 6.3%  CAD status post previous MARCELL x 4  Hypertension: Controlled  Dyslipidemia: Controlled  CKD stage III  Venous insufficiency status post remote left lower extremity vein stripping     Mr. Rosenberg has stable claudication.  Few abrasions on his feet with no clear evidence of new arterial ulcer.  Therefore, recommend continuing follow-up with podiatry/Dr. Mcclain closely and continue medical  management.    He will be starting home based program now that he anticipates less traveling.      Recommendations:  Start home-based walking program as previously discussed.  Continue current medical management on DAPT and high intensity statin.  LDL has been less than 70.  Smoking cessation is highly encouraged.  Will discuss again in details next visit.  Follow-up in 3 months with CAROL prior to the visit and arterial duplex    It was a pleasure meeting with Mr. Walker in our clinic again today.         Again, thank you for allowing me to participate in the care of your patient.      Sincerely,    Fernando Shipley MD

## 2024-05-24 NOTE — PATIENT INSTRUCTIONS
Thank you so much for choosing us for your care. It was a pleasure to see you at the vascular clinic today.     Follow-up recommendations:   - Continue attempting to add a walking program into your daily regimen.   - Return to clinic to see Dr Shipley in about 3 months with testing prior.    Additional testing/imaging ordered today: Complete ABIs and lower extremity ultrasound in 3 months prior to clinic follow up.      Our scheduling team will get in touch with you to set up any follow-up testing/imaging and/or appointments. Please be aware that any testing/imaging recommended today will need to completed prior to your next visit with the provider. If testing/imaging is not completed prior to your next visit, your visit may be rescheduled.     If you have any questions, please contact our clinic directly at [phone] and ask for the nurse. We also encourage the use of [facility] to communicate with your healthcare provider.    If you have an urgent need after business hours (8:00 am to 4:30 pm) please call [phone], option 4, and ask for the vascular attending on call. For non-urgent after hours needs, please call the vascular clinic at [phone]. For scheduling needs, please call our clinic directly at [phone].    =====================================================================    [facility] is recognized by the [facility] as a comprehensive stroke center. As part of our commitment to better patient outcomes and excellent stroke education, we attach the below stroke education materials to ALL of the after visit summaries in our vascular clinic.        Learning About BE FAST: Stroke Warning Signs  BE FAST is a simple way to remember the main symptoms of stroke. These symptoms happen suddenly. So learning what to look for helps you know when to call for medical help. BE FAST stands for:    B - Balance.  Loss of balance or trouble walking.     E - Eyes.  Trouble seeing  out of one or both eyes.     F - Face.  Weakness or drooping on one side of the face.     A - Arm.  Weakness or numbness in an arm or leg.     S - Speech.  Trouble speaking.     T - Time to call 911.  Also call 911 if you have other stroke symptoms. They include:  Sudden confusion.  Sudden trouble understanding simple statements.  Fainting.  A seizure.  A sudden, severe headache.     A stroke happens when a blood vessel in the brain bursts or is blocked by a blood clot. The blood supply to part of the brain--and the oxygen the blood carries--is reduced. This damages the brain.   If you have a stroke, quick treatment may save your life. And it may reduce the damage in your brain so that you have fewer problems after the stroke.   Current as of: December 18, 2022               Content Version: 13.8    0659-3537 [email or web address].   Care instructions adapted under license by your healthcare professional. If you have questions about a medical condition or this instruction, always ask your healthcare professional. [email or web address] disclaims any warranty or liability for your use of this information.    Learning About How to Prevent a Stroke  What is a stroke?  A stroke is damage to the brain that occurs when a blood vessel in the brain bursts or is blocked by a blood clot. Without blood and the oxygen it carries, part of the brain starts to die. The part of the body controlled by the damaged area of the brain can't work properly.  Brain damage can start within minutes of a stroke. But quick treatment can help limit the damage and increase the chance of a full recovery.  What puts you at risk for stroke?  A risk factor is anything that makes you more likely to have a particular health problem.  Risk factors for stroke that you can manage or change include:  Health problems like atrial fibrillation, diabetes, high blood pressure, high cholesterol, hardening of the arteries (atherosclerosis), and sickle cell  disease.  Smoking.  Drinking more than 2 alcoholic drinks a day for men and 1 drink a day for women.  Being overweight.  Not eating healthy foods.  Not getting enough physical activity.  Risk factors you can't change include:  Having a previous stroke.  Family history of stroke.  Being older.  Being , [place] Native, , or South  American.  Being female.  Having certain problems during pregnancy, such as preeclampsia.  Being past menopause.  Your doctor can help you know your risk. Then you and your doctor can talk about whether to take steps to lower it.  How can you help prevent a stroke?  Here are some things you can do to help prevent a stroke.  Manage health problems that raise your risk. These include atrial fibrillation, diabetes, high blood pressure, and high cholesterol.  Have a heart-healthy lifestyle.  Don't smoke. If you need help quitting, talk to your doctor about stop-smoking programs and medicines. These can increase your chances of quitting for good.  Limit alcohol to 2 drinks a day for men and 1 drink a day for women.  Stay at a healthy weight. Lose weight if you need to.  Be active. Get at least 30 minutes of exercise on most days of the week. Walking is a good choice. You also may want to do other activities, such as running, swimming, cycling, or playing tennis or team sports.  Eat heart-healthy foods. These include vegetables, fruits, nuts, beans, lean meat, fish, and whole grains. Limit sodium and sugar.  If you think you may have a problem with alcohol or drug use, talk to your doctor.  If you use hormone therapy for menopause or hormonal birth control, talk with your doctor. Ask if these are right for you. They may raise the risk of stroke in some people.  Decide with your doctor whether you will also take medicines to help lower your risk. For example, you and your doctor may decide you will take a medicine that prevents blood clots.  What are the symptoms  of a stroke?  Symptoms of a stroke happen quickly. A stroke may cause:  Sudden numbness, tingling, weakness, or loss of movement in your face, arm, or leg, especially on only one side of your body.  Sudden vision changes.  Sudden trouble speaking.  Sudden confusion or trouble understanding simple statements.  Sudden problems with walking or balance.  A sudden, severe headache that is different from past headaches.  Fainting.  A seizure.  It's important to call for medical help if you have stroke symptoms. Quick treatment may save your life. And it may reduce the damage in your brain so that you have fewer problems after the stroke.  Follow-up care is a key part of your treatment and safety. Be sure to make and go to all appointments, and call your doctor if you are having problems. It's also a good idea to know your test results and keep a list of the medicines you take.    Current as of: December 18, 2022               Content Version: 13.8    1425-7334 [employer].   Care instructions adapted under license by your healthcare professional. If you have questions about a medical condition or this instruction, always ask your healthcare professional. [employer] disclaims any warranty or liability for your use of this information.

## 2024-05-24 NOTE — PROGRESS NOTES
VASCULAR MEDICINE PROGRESS NOTE          LOCATION:  [facility]        Date of Service: 5/24/2024      Primary Care Provider: Racheal Swift      Reason for the visit/chief complaint:   Follow up on PAD      Subjective:  Amos Walker is a pleasant 79 year old male who presents to our Vascular Medicine clinic to follow-up.  I first met with him 1/5/2024.  I refer the reader to my initial consultation note for details.    Briefly, he has medical history significant for type 2 diabetes mellitus, tobacco smoking, hypertension, dyslipidemia, CAD s/p MARCELL x4 in march 2018, CKD stage 3, venous insufficiency with previous left leg vein stripping and peripheral arterial disease status post previous interventions bilaterally due to lower extremity nonhealing ulcers in September 2018 on the right and January 2021 on the left.     Last visit, he was having stable claudication symptoms with no evidence of CLTI.  We encouraged continuing medical management and encouraged walking program.    Today, Mr. Walker reports that he had eventful last few months and was not able to commit to walking.  He unfortunately lost one of his brothers and had to travel to [place].  His other brother on the [facility] is now put in hospice for which he needed to travel to see him as well.  While he was traveling, he always used wheelchair at the airport mainly due to him traveling through big airports that requires miles of walking.  Other than that, he has not had any worsening in his daily activity claudication symptoms that remains only with long distance.    He has been in follow-up with Dr. Mcclain in podiatry and has had multiple abrasions to his lower extremities.  No significant concern for new arterial nonhealing ulceration.      Past medical history, surgical history, medications, family history, social history and allergies were reviewed. Pertinent points mentioned under  HPI.        OBJECTIVE:    Vital signs:  /68 (BP Location: Left arm, Patient Position: Sitting, Cuff Size: Adult Regular)   Pulse 86   SpO2 99%   Wt Readings from Last 1 Encounters:   03/01/24 176 lb 8 oz     There is no height or weight on file to calculate BMI.    Physical exam:  General appearance: Pleasant male in no apparent distress.    HEENT: NC/AT.    Neck: Carotids +2/2 bilaterally without bruits.  No jugular venous distension.   Heart: RRR. Normal S1, S2.   Extremities: Mild pitting edema noted to lower extremities bilaterally. Unable to feel DP and PT bilaterally consistent with prior exam. Monophasic by Doppler.  Skin: Right anterior medial leg and dorsal right hallux abrasions.  Left second toe covered with dressing.  Pictures reviewed from his recent follow-up with podiatry a week ago that seems to be a stable eschar.  Neurological: Alert, awake and oriented           DIAGNOSTIC STUDIES:   Labs and diagnostics reviewed including outside records. Pertinent points are mentioned under HPI and assessment and plan sections.        ASSESSMENT AND PLAN:    Moderate peripheral arterial disease with previous bilateral intervention, stable  Long history of tobacco smoking: Active  Type 2 diabetes mellitus, controlled A1c 6.3%  CAD status post previous MARCELL x 4  Hypertension: Controlled  Dyslipidemia: Controlled  CKD stage III  Venous insufficiency status post remote left lower extremity vein stripping     Mr. Rosenberg has stable claudication.  Few abrasions on his feet with no clear evidence of new arterial ulcer.  Therefore, recommend continuing follow-up with podiatry/Dr. Mcclain closely and continue medical management.    He will be starting home based program now that he anticipates less traveling.      Recommendations:  Start home-based walking program as previously discussed.  Continue current medical management on DAPT and high intensity statin.  LDL has been less than 70.  Smoking cessation is  highly encouraged.  Will discuss again in details next visit.  Follow-up in 3 months with CAROL prior to the visit and arterial duplex    It was a pleasure meeting with Mr. Walker in our clinic again today.       Fernando Shipley MD  Vascular Medicine  May 24, 2024

## 2024-05-29 ENCOUNTER — OFFICE VISIT (OUTPATIENT)
Dept: PODIATRY | Facility: CLINIC | Age: 79
End: 2024-05-29
Payer: COMMERCIAL

## 2024-05-29 DIAGNOSIS — L84 TYLOMA: ICD-10-CM

## 2024-05-29 DIAGNOSIS — E11.49 TYPE II OR UNSPECIFIED TYPE DIABETES MELLITUS WITH NEUROLOGICAL MANIFESTATIONS, NOT STATED AS UNCONTROLLED(250.60) (H): ICD-10-CM

## 2024-05-29 DIAGNOSIS — E11.51 DIABETES MELLITUS WITH PERIPHERAL VASCULAR DISEASE (H): Primary | ICD-10-CM

## 2024-05-29 DIAGNOSIS — I87.8 VENOUS STASIS: ICD-10-CM

## 2024-05-29 DIAGNOSIS — L97.911 ULCER OF RIGHT LOWER EXTREMITY, LIMITED TO BREAKDOWN OF SKIN (H): ICD-10-CM

## 2024-05-29 DIAGNOSIS — E11.610 CHARCOT FOOT DUE TO DIABETES MELLITUS (H): ICD-10-CM

## 2024-05-29 DIAGNOSIS — L97.521 SKIN ULCER OF LEFT FOOT, LIMITED TO BREAKDOWN OF SKIN (H): ICD-10-CM

## 2024-05-29 PROCEDURE — 99214 OFFICE O/P EST MOD 30 MIN: CPT | Performed by: PODIATRIST
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
## 2024-05-29 NOTE — PATIENT INSTRUCTIONS
Spoke to mom Ms Vee Edgar   10ml of Senakot 2 hours before other medicine 2 times a day   (Every 10 hours )    Plans to get the patient into nursing home soon. We discussed that the paperwork sent from the Nursing home  contains a different medication regimen than that of what we discussed at the visit  Mom agreed that she will reach out to the NH and get us the right paperwork with the right regimen.     Mom agrees the level of care is intermediate but wants the regimen corrected.            Thanks for coming today.  [facility]  [address] [place] [address]    To schedule future appointments in Ortho Clinic, you may call [phone].    To schedule ordered imaging by your provider:   Call [facility] [phone]    To schedule an injection ordered by your provider:  Call [facility] Injection scheduling line: [phone]  [employer] available online at:  [email or web address]    Please call if any further questions or concerns ([phone]).  Clinic hours 8 am to 5 pm.    Return to clinic (call) if symptoms worsen or fail to improve.

## 2024-05-30 DIAGNOSIS — I73.9 PERIPHERAL VASCULAR DISEASE, UNSPECIFIED (H): ICD-10-CM

## 2024-05-30 NOTE — TELEPHONE ENCOUNTER
[facility] Call Center    Phone Message    May a detailed message be left on voicemail: yes     Reason for Call: Medication Refill Request    Has the patient contacted the pharmacy for the refill? Yes   Name of medication being requested: clopidogrel (PLAVIX) 75 MG tablet   Provider who prescribed the medication: PCP  Pharmacy: [id number] - [place] - [address] [facility] [address]   Date medication is needed: ASAP  Pharmacy told patient they've sent requests and have not heard anything back. Patient is out of this and needs a refill asap. He's leaving on Monday for a week. Please refill as soon as you can.

## 2024-05-31 NOTE — TELEPHONE ENCOUNTER
clopidogrel (PLAVIX) 75 MG tablet   Disp-90 tablet, R-3,   Start: 04/19/2023     3/1/2024  [facility] Racheal Pisano MD  Internal Medicine    Routed because: request per pt call.  Plavix [id number]/31/2024 09:46 AM   Protocol Details Normal HGB on file in past 12 months    Medication indicated for associated diagnosis     Hgb 2/13/24   Hemoglobin  13.3 - 17.7 g/dL 12.4 Low      Repeat lab ordered. Not drawn.

## 2024-06-03 ENCOUNTER — DOCUMENTATION ONLY (OUTPATIENT)
Dept: INTERNAL MEDICINE | Facility: CLINIC | Age: 79
End: 2024-06-03
Payer: COMMERCIAL

## 2024-06-03 RX ORDER — CLOPIDOGREL BISULFATE 75 MG/1
75 TABLET ORAL DAILY
Qty: 90 TABLET | Refills: 2 | Status: SHIPPED | OUTPATIENT
Start: 2024-06-03 | End: 2024-06-14

## 2024-06-10 NOTE — PROGRESS NOTES
Type of Form Received: Updated PT cert 5/25-7/11    Form Received (Date) 5/30/24   Form Filled out Yes, faxed 6/17   Placed in provider folder Yes

## 2024-06-12 ENCOUNTER — LAB (OUTPATIENT)
Dept: LAB | Facility: CLINIC | Age: 79
End: 2024-06-12
Payer: COMMERCIAL

## 2024-06-12 ENCOUNTER — OFFICE VISIT (OUTPATIENT)
Dept: PODIATRY | Facility: CLINIC | Age: 79
End: 2024-06-12
Payer: COMMERCIAL

## 2024-06-12 DIAGNOSIS — L97.911 ULCER OF RIGHT LOWER EXTREMITY, LIMITED TO BREAKDOWN OF SKIN (H): ICD-10-CM

## 2024-06-12 DIAGNOSIS — E11.49 TYPE II OR UNSPECIFIED TYPE DIABETES MELLITUS WITH NEUROLOGICAL MANIFESTATIONS, NOT STATED AS UNCONTROLLED(250.60) (H): ICD-10-CM

## 2024-06-12 DIAGNOSIS — R35.0 URINARY FREQUENCY: ICD-10-CM

## 2024-06-12 DIAGNOSIS — L97.521 SKIN ULCER OF LEFT FOOT, LIMITED TO BREAKDOWN OF SKIN (H): ICD-10-CM

## 2024-06-12 DIAGNOSIS — I87.8 VENOUS STASIS: ICD-10-CM

## 2024-06-12 DIAGNOSIS — Z12.5 ENCOUNTER FOR SCREENING FOR MALIGNANT NEOPLASM OF PROSTATE: ICD-10-CM

## 2024-06-12 DIAGNOSIS — Z79.4 TYPE 2 DIABETES MELLITUS WITH DIABETIC PERIPHERAL ANGIOPATHY WITHOUT GANGRENE, WITH LONG-TERM CURRENT USE OF INSULIN (H): ICD-10-CM

## 2024-06-12 DIAGNOSIS — E11.610 CHARCOT FOOT DUE TO DIABETES MELLITUS (H): ICD-10-CM

## 2024-06-12 DIAGNOSIS — E11.51 TYPE 2 DIABETES MELLITUS WITH DIABETIC PERIPHERAL ANGIOPATHY WITHOUT GANGRENE, WITH LONG-TERM CURRENT USE OF INSULIN (H): ICD-10-CM

## 2024-06-12 DIAGNOSIS — E11.51 DIABETES MELLITUS WITH PERIPHERAL VASCULAR DISEASE (H): Primary | ICD-10-CM

## 2024-06-12 LAB — HBA1C MFR BLD: 6.4 % (ref 0–5.6)

## 2024-06-12 PROCEDURE — G0103 PSA SCREENING: HCPCS

## 2024-06-12 PROCEDURE — 99213 OFFICE O/P EST LOW 20 MIN: CPT | Mod: 25 | Performed by: PODIATRIST

## 2024-06-12 PROCEDURE — 83036 HEMOGLOBIN GLYCOSYLATED A1C: CPT

## 2024-06-12 PROCEDURE — 36415 COLL VENOUS BLD VENIPUNCTURE: CPT

## 2024-06-12 PROCEDURE — 29581 APPL MULTLAYER CMPRN SYS LEG: CPT | Mod: 50 | Performed by: PODIATRIST

## 2024-06-12 RX ORDER — GENTAMICIN SULFATE 1 MG/G
CREAM TOPICAL DAILY
Qty: 60 G | Refills: 1 | Status: SHIPPED | OUTPATIENT
Start: 2024-06-12

## 2024-06-12 RX ORDER — LEVOFLOXACIN 750 MG/1
750 TABLET, FILM COATED ORAL DAILY
Qty: 14 TABLET | Refills: 0 | Status: SHIPPED | OUTPATIENT
Start: 2024-06-12 | End: 2024-06-26
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
## 2024-06-12 NOTE — NURSING NOTE
Amos Walker's chief complaint for this visit includes:  Chief Complaint   Patient presents with    Consult     Leg recheck     PCP: Racheal Swift    Referring Provider:  Referred Self, MD  No address on file    There were no vitals taken for this visit.  Data Unavailable     Do you need any medication refills at today's visit? NO    Allergies   Allergen Reactions    No Clinical Screening - See Comments      methylisothiazolinone    Seasonal Allergies     Simvastatin Other (See Comments)     Sun sensitivty    Methylisothiazolinone Rash    Neomycin      Wound gets worse    Povidone Iodine Rash       Chiquis Morris LPN
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
## 2024-06-13 LAB — PSA SERPL DL<=0.01 NG/ML-MCNC: 2.23 NG/ML (ref 0–6.5)

## 2024-06-14 ENCOUNTER — OFFICE VISIT (OUTPATIENT)
Dept: INTERNAL MEDICINE | Facility: CLINIC | Age: 79
End: 2024-06-14
Payer: COMMERCIAL

## 2024-06-14 VITALS
OXYGEN SATURATION: 99 % | SYSTOLIC BLOOD PRESSURE: 136 MMHG | BODY MASS INDEX: 22.24 KG/M2 | WEIGHT: 175.6 LBS | HEART RATE: 98 BPM | DIASTOLIC BLOOD PRESSURE: 73 MMHG

## 2024-06-14 DIAGNOSIS — Z29.11 NEED FOR VACCINATION AGAINST RESPIRATORY SYNCYTIAL VIRUS: ICD-10-CM

## 2024-06-14 DIAGNOSIS — Z12.11 SCREENING FOR COLON CANCER: ICD-10-CM

## 2024-06-14 DIAGNOSIS — E11.49 TYPE II OR UNSPECIFIED TYPE DIABETES MELLITUS WITH NEUROLOGICAL MANIFESTATIONS, NOT STATED AS UNCONTROLLED(250.60) (H): ICD-10-CM

## 2024-06-14 DIAGNOSIS — Z87.891 PERSONAL HISTORY OF NICOTINE DEPENDENCE: ICD-10-CM

## 2024-06-14 DIAGNOSIS — I25.10 CORONARY ARTERY DISEASE DUE TO LIPID RICH PLAQUE: ICD-10-CM

## 2024-06-14 DIAGNOSIS — E11.40 TYPE 2 DIABETES, CONTROLLED, WITH NEUROPATHY (H): ICD-10-CM

## 2024-06-14 DIAGNOSIS — I73.9 PVD (PERIPHERAL VASCULAR DISEASE) (H): ICD-10-CM

## 2024-06-14 DIAGNOSIS — I25.83 CORONARY ARTERY DISEASE DUE TO LIPID RICH PLAQUE: ICD-10-CM

## 2024-06-14 DIAGNOSIS — I10 PRIMARY HYPERTENSION: ICD-10-CM

## 2024-06-14 DIAGNOSIS — E11.51 DIABETES MELLITUS WITH PERIPHERAL VASCULAR DISEASE (H): ICD-10-CM

## 2024-06-14 DIAGNOSIS — Z12.11 SCREEN FOR COLON CANCER: Primary | ICD-10-CM

## 2024-06-14 DIAGNOSIS — I73.9 PERIPHERAL VASCULAR DISEASE, UNSPECIFIED (H): ICD-10-CM

## 2024-06-14 DIAGNOSIS — F17.200 TOBACCO DEPENDENCE SYNDROME: ICD-10-CM

## 2024-06-14 PROCEDURE — 99214 OFFICE O/P EST MOD 30 MIN: CPT | Performed by: INTERNAL MEDICINE

## 2024-06-14 PROCEDURE — G2211 COMPLEX E/M VISIT ADD ON: HCPCS | Performed by: INTERNAL MEDICINE

## 2024-06-14 RX ORDER — LISINOPRIL 2.5 MG/1
2.5 TABLET ORAL DAILY
Qty: 90 TABLET | Refills: 3 | Status: SHIPPED | OUTPATIENT
Start: 2024-06-14

## 2024-06-14 RX ORDER — SIMVASTATIN 40 MG
40 TABLET ORAL AT BEDTIME
Qty: 90 TABLET | Refills: 3 | Status: SHIPPED | OUTPATIENT
Start: 2024-06-14

## 2024-06-14 RX ORDER — CLOPIDOGREL BISULFATE 75 MG/1
75 TABLET ORAL DAILY
Qty: 90 TABLET | Refills: 3 | Status: SHIPPED | OUTPATIENT
Start: 2024-06-14 | End: 2024-07-10

## 2024-06-14 RX ORDER — INSULIN LISPRO 100 [IU]/ML
3-4 INJECTION, SOLUTION INTRAVENOUS; SUBCUTANEOUS
Qty: 30 ML | Refills: 3 | Status: SHIPPED | OUTPATIENT
Start: 2024-06-14

## 2024-06-14 RX ORDER — DULAGLUTIDE 1.5 MG/.5ML
1.5 INJECTION, SOLUTION SUBCUTANEOUS
Qty: 6 ML | Refills: 3 | Status: SHIPPED | OUTPATIENT
Start: 2024-06-14

## 2024-06-14 RX ORDER — RESPIRATORY SYNCYTIAL VIRUS VACCINE 120MCG/0.5
0.5 KIT INTRAMUSCULAR ONCE
Qty: 1 EACH | Refills: 0 | Status: CANCELLED | OUTPATIENT
Start: 2024-06-14 | End: 2024-06-14

## 2024-06-14 NOTE — PROGRESS NOTES
Assessment & Plan     Need for vaccination against respiratory syncytial virus  Advised at pharmacy    Screen for colon cancer  - Colonoscopy Screening  Referral; Future    Peripheral vascular disease, unspecified (H24)  Tobacco dependence syndrome    - CT Chest Lung Cancer Scrn Low Dose wo; Future    Screening for colon cancer  - Colonoscopy Screening  Referral; Future    Personal history of nicotine dependence  - CT Chest Lung Cancer Scrn Low Dose wo; Future    Meds refilled to new [facility]acy per patient request        The longitudinal plan of care for the diagnosis(es)/condition(s) as documented were addressed during this visit. Due to the added complexity in care, I will continue to support Amos in the subsequent management and with ongoing continuity of care.          Return in about 4 weeks (around 7/12/2024) for with me.    Mel Trinidad is a 79 year old, presenting for the following health issues:  Follow Up (Follow  up on diabetes, hypertension, kidney disease and heart issue)      6/14/2024     9:57 AM   Additional Questions   Roomed by KTR     History of Present Illness       CKD: He is not using over the counter pain medicine.     Diabetes:   He presents for follow up of diabetes.   He is checking home blood glucose with a continuous glucose monitor.   He checks blood glucose before and after meals and at bedtime.  Blood glucose is sometimes over 200 and never under 70. He is aware of hypoglycemia symptoms including weakness.    He has no concerns regarding his diabetes at this time.  He is having numbness in feet and redness, sores, or blisters on feet.            Hypertension: He presents for follow up of hypertension.  He does check blood pressure  regularly outside of the clinic. Outside blood pressures have been over 140/90. He does not follow a low salt diet.     Vascular Disease:  He presents for follow up of vascular disease.     He never takes nitroglycerin. He takes  daily aspirin.    He eats 0-1 servings of fruits and vegetables daily.He consumes 0 sweetened beverage(s) daily.He exercises with enough effort to increase his heart rate 9 or less minutes per day.  He exercises with enough effort to increase his heart rate 3 or less days per week. He is missing 1 dose(s) of medications per week.       PMH involving tobacco use, DM, HTN, CAD, PAD, Right Charcot foot, diabetic neuropathy, emphysema, tobacco use, anemia of chronic disease, MGUS, osteoporosis.    Recently older brother  95 yo, also younger brother 86 yo  after being on hospice, strokes  Roommate  as well    Recently saw podiatry, got unna boot and levaquin for leg ulcr  NO cardiopulmonary concerns  Last A1c was 6.4  Reports some dermatitis on arms, hasn't used TAC cream  He reports still taking glargine 8 and humalog, he's hesitant about about switching to only GLP1a  Has been putting off dental work but plans to get this summer, getting a plate      Medical Problems:  DM2: C/b neuropathy, charcot foot, chronic wounds, CKD, macrovascular disease, On insulin, trulicity, A1c tightly controlled, if not perhaps too low at times, discussed risks of  hypoglycemia with patient    PAD: previous PAD interventions, he was previously following up with Dr. Maharaj and Dr. Lyons in vascular surgery.  He is s/p right femoral endarterectomy, SFA stent and PTA of the popliteal artery on 2018 for nonhealing right lower extremity ulcer.  Subsequently had left SFA and popliteal artery balloon angioplasty 2021 with Dr. Maharaj also for left medial malleolus nonhealing ulceration.  No intervention since.    Last follow-up with Dr. Lyons was May 2022.      CAD: 2V CAD involving LAD and RCA, s/p DESx4 in 3/18, on DAPT indefinitely given PAD    Hip Fracture/Osteoporosis: After fall, s/p surgery, started alendronate     Routine Health Maintenance  Immunizations:   Most Recent Immunizations   Administered Date(s)  "Administered    COVID-19,PF,Pfizer 05/06/2021    Influenza (High Dose) 3 valent vaccine 10/04/2019    Influenza (IIV3) PF 11/01/2013    Influenza, Quad, High Dose, Pf, 65yr + 10/05/2020    Pneumo Conj 13-V (2010&after) 11/03/2014    Pneumococcal 23 valent 12/21/2015    TDAP Vaccine (Boostrix) 03/21/2016      Lipids:   Recent Labs   Lab Test 11/06/23  1440 09/08/22  1645   CHOL 115 124   HDL 56 49   LDL 39 49   TRIG 98 129     PSA (50-75 yrs): No results found for: \"PSA\"    DEXA: 7/21 FRAX elevated, multiple fractures, qualifies for treatment, started alendronate 5/22  AAA Screening (65-75 yrs): CT 12/17, negative  Lung Ca Screening (>30 py 55-79 or >20 py 50-79 + RF): 7/20, 7/21, 7/22, due 7/23  Colonoscopy (50-75 yrs): due 6/24, 6/19 Impression:          - The examined portion of the ileum was normal.                        - One 5 mm polyp in the cecum, removed with a cold snare                        and removed using injection-lift and a cold snare.                        Resected and retrieved.                        - One 6 mm polyp in the transverse colon, removed with a                        hot snare and removed using injection-lift and a hot                        snare. Resected and retrieved. Clip was placed.                        - One 5 mm polyp in the sigmoid colon, removed with a                        hot snare. Resected and retrieved.                        - The examination was otherwise normal on direct and                        retroflexion views.                        NOTE: the polyp reported as being in the descending                        colon in the prior colonoscopy report appears on images                        in the transverse colon; that is where we removed a                        likely SSA. The descending colon was inspected on                        multiple occasions and no polyps were identified.   Recommendation:      - Return to referring physician.                        " - Repeat colonoscopy in 5 years for surveillance.                                                                           HIV/HCV if risk factors: nonreactive HepC 6/16  Safety/Lifestyle: reviewed  Tob/EtOH: declines quitting  Depression: PHQ-2 Score:         1/3/2024     3:25 PM 7/19/2023    12:21 PM   PHQ-2 ( 1999 Pfizer)   Q1: Little interest or pleasure in doing things 1 1   Q2: Feeling down, depressed or hopeless 1 0   PHQ-2 Score 2 1                         Objective    /73 (BP Location: Right arm, Patient Position: Sitting, Cuff Size: Adult Regular)   Pulse 98   Wt 79.7 kg (175 lb 9.6 oz)   SpO2 99%   BMI 22.24 kg/m    Body mass index is 22.24 kg/m .  Physical Exam   Constitutional: Alert, oriented, pleasant, no acute distress  Head: Normocephalic, atraumatic  Eyes: Extra-ocular movements intact, pupils equally round and reactive bilaterally, no scleral icterus  ENT: Oropharynx clear, moist mucus membranes, good dentition  Neck: Supple, no lymphadenopathy  Cardiovascular: Regular rate and rhythm, no murmurs, rubs or gallops, peripheral pulses full/symmetric  Respiratory: Good air movement bilaterally, lungs clear, no wheezes/rales/rhonchi  Musculoskeletal: No edema, normal muscle tone, normal gait  Neurologic: Alert and oriented, cranial nerves 2-12 intact, strength 5/5 throughout  Skin: No rashes/lesions  Psychiatric: normal mentation, affect and mood              Signed Electronically by: Racheal Swift MD

## 2024-06-17 ENCOUNTER — TELEPHONE (OUTPATIENT)
Dept: ORTHOPEDICS | Facility: CLINIC | Age: 79
End: 2024-06-17

## 2024-06-17 ENCOUNTER — OFFICE VISIT (OUTPATIENT)
Dept: PODIATRY | Facility: CLINIC | Age: 79
End: 2024-06-17
Payer: COMMERCIAL

## 2024-06-17 DIAGNOSIS — I87.8 VENOUS STASIS: ICD-10-CM

## 2024-06-17 DIAGNOSIS — E11.49 TYPE II OR UNSPECIFIED TYPE DIABETES MELLITUS WITH NEUROLOGICAL MANIFESTATIONS, NOT STATED AS UNCONTROLLED(250.60) (H): ICD-10-CM

## 2024-06-17 DIAGNOSIS — S90.821S BLISTER OF RIGHT FOOT, SEQUELA: ICD-10-CM

## 2024-06-17 DIAGNOSIS — E11.610 CHARCOT FOOT DUE TO DIABETES MELLITUS (H): ICD-10-CM

## 2024-06-17 DIAGNOSIS — E11.51 DIABETES MELLITUS WITH PERIPHERAL VASCULAR DISEASE (H): Primary | ICD-10-CM

## 2024-06-17 PROCEDURE — 99213 OFFICE O/P EST LOW 20 MIN: CPT | Mod: 25 | Performed by: PODIATRIST

## 2024-06-17 PROCEDURE — 97597 DBRDMT OPN WND 1ST 20 CM/<: CPT | Performed by: PODIATRIST
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
## 2024-06-17 NOTE — TELEPHONE ENCOUNTER
AWA left requesting a return call to schedule a follow up visit with Dr. eKller.     Nelly Sanchez LPN

## 2024-06-17 NOTE — NURSING NOTE
Amos Walker's chief complaint for this visit includes:  Chief Complaint   Patient presents with    Consult     Leg recheck and wound care     PCP: Racheal Swift    Referring Provider:  Referred Self, MD  No address on file    There were no vitals taken for this visit.  Data Unavailable     Do you need any medication refills at today's visit? NO    Allergies   Allergen Reactions    No Clinical Screening - See Comments      methylisothiazolinone    Seasonal Allergies     Simvastatin Other (See Comments)     Sun sensitivty    Methylisothiazolinone Rash    Neomycin      Wound gets worse    Povidone Iodine Rash       Chiquis Morris LPN
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
## 2024-06-26 ENCOUNTER — OFFICE VISIT (OUTPATIENT)
Dept: PODIATRY | Facility: CLINIC | Age: 79
End: 2024-06-26
Payer: COMMERCIAL

## 2024-06-26 DIAGNOSIS — L97.911 ULCER OF RIGHT LOWER EXTREMITY, LIMITED TO BREAKDOWN OF SKIN (H): ICD-10-CM

## 2024-06-26 DIAGNOSIS — E11.49 TYPE II OR UNSPECIFIED TYPE DIABETES MELLITUS WITH NEUROLOGICAL MANIFESTATIONS, NOT STATED AS UNCONTROLLED(250.60) (H): ICD-10-CM

## 2024-06-26 DIAGNOSIS — L97.521 SKIN ULCER OF LEFT FOOT, LIMITED TO BREAKDOWN OF SKIN (H): ICD-10-CM

## 2024-06-26 DIAGNOSIS — E11.51 DIABETES MELLITUS WITH PERIPHERAL VASCULAR DISEASE (H): ICD-10-CM

## 2024-06-26 DIAGNOSIS — E11.610 CHARCOT FOOT DUE TO DIABETES MELLITUS (H): ICD-10-CM

## 2024-06-26 DIAGNOSIS — I87.8 VENOUS STASIS: ICD-10-CM

## 2024-06-26 PROCEDURE — 99214 OFFICE O/P EST MOD 30 MIN: CPT | Performed by: PODIATRIST

## 2024-06-26 RX ORDER — LEVOFLOXACIN 750 MG/1
750 TABLET, FILM COATED ORAL DAILY
Qty: 14 TABLET | Refills: 0 | Status: SHIPPED | OUTPATIENT
Start: 2024-06-26
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
## 2024-06-26 NOTE — NURSING NOTE
Amos Walker's chief complaint for this visit includes:  Chief Complaint   Patient presents with    Left Foot - WOUND CARE, Follow Up    Right Foot - WOUND CARE, Follow Up     PCP: Racheal Swift    Referring Provider:  Referred Self, MD  No address on file    There were no vitals taken for this visit.  Data Unavailable     Do you need any medication refills at today's visit? NO    Allergies   Allergen Reactions    No Clinical Screening - See Comments      methylisothiazolinone    Seasonal Allergies     Simvastatin Other (See Comments)     Sun sensitivty    Methylisothiazolinone Rash    Neomycin      Wound gets worse    Povidone Iodine Rash       Conrado Ashby, EMT

## 2024-06-28 DIAGNOSIS — Z96.649 S/P HIP HEMIARTHROPLASTY: Primary | ICD-10-CM

## 2024-06-30 DIAGNOSIS — I73.9 PERIPHERAL VASCULAR DISEASE, UNSPECIFIED (H): ICD-10-CM

## 2024-07-01 ENCOUNTER — OFFICE VISIT (OUTPATIENT)
Dept: PODIATRY | Facility: CLINIC | Age: 79
End: 2024-07-01
Payer: COMMERCIAL

## 2024-07-01 DIAGNOSIS — E11.610 CHARCOT FOOT DUE TO DIABETES MELLITUS (H): ICD-10-CM

## 2024-07-01 DIAGNOSIS — E11.49 TYPE II OR UNSPECIFIED TYPE DIABETES MELLITUS WITH NEUROLOGICAL MANIFESTATIONS, NOT STATED AS UNCONTROLLED(250.60) (H): ICD-10-CM

## 2024-07-01 DIAGNOSIS — S90.821S BLISTER OF RIGHT FOOT, SEQUELA: ICD-10-CM

## 2024-07-01 DIAGNOSIS — L97.521 SKIN ULCER OF LEFT FOOT, LIMITED TO BREAKDOWN OF SKIN (H): ICD-10-CM

## 2024-07-01 DIAGNOSIS — E11.51 DIABETES MELLITUS WITH PERIPHERAL VASCULAR DISEASE (H): Primary | ICD-10-CM

## 2024-07-01 DIAGNOSIS — I87.8 VENOUS STASIS: ICD-10-CM

## 2024-07-01 PROCEDURE — 99214 OFFICE O/P EST MOD 30 MIN: CPT | Performed by: PODIATRIST
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
## 2024-07-10 ENCOUNTER — OFFICE VISIT (OUTPATIENT)
Dept: PODIATRY | Facility: CLINIC | Age: 79
End: 2024-07-10
Payer: COMMERCIAL

## 2024-07-10 DIAGNOSIS — E11.51 DIABETES MELLITUS WITH PERIPHERAL VASCULAR DISEASE (H): Primary | ICD-10-CM

## 2024-07-10 DIAGNOSIS — E11.610 CHARCOT FOOT DUE TO DIABETES MELLITUS (H): ICD-10-CM

## 2024-07-10 DIAGNOSIS — S90.821S BLISTER OF RIGHT FOOT, SEQUELA: ICD-10-CM

## 2024-07-10 DIAGNOSIS — E11.49 TYPE II OR UNSPECIFIED TYPE DIABETES MELLITUS WITH NEUROLOGICAL MANIFESTATIONS, NOT STATED AS UNCONTROLLED(250.60) (H): ICD-10-CM

## 2024-07-10 DIAGNOSIS — I87.8 VENOUS STASIS: ICD-10-CM

## 2024-07-10 DIAGNOSIS — L97.521 SKIN ULCER OF LEFT FOOT, LIMITED TO BREAKDOWN OF SKIN (H): ICD-10-CM

## 2024-07-10 PROCEDURE — 99214 OFFICE O/P EST MOD 30 MIN: CPT | Performed by: PODIATRIST

## 2024-07-10 RX ORDER — CLOPIDOGREL BISULFATE 75 MG/1
75 TABLET ORAL DAILY
Qty: 90 TABLET | Refills: 3 | Status: SHIPPED | OUTPATIENT
Start: 2024-07-10
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
## 2024-07-12 ENCOUNTER — OFFICE VISIT (OUTPATIENT)
Dept: ORTHOPEDICS | Facility: CLINIC | Age: 79
End: 2024-07-12
Payer: COMMERCIAL

## 2024-07-12 ENCOUNTER — ANCILLARY PROCEDURE (OUTPATIENT)
Dept: GENERAL RADIOLOGY | Facility: CLINIC | Age: 79
End: 2024-07-12
Attending: ORTHOPAEDIC SURGERY
Payer: COMMERCIAL

## 2024-07-12 DIAGNOSIS — Z96.649 S/P HIP HEMIARTHROPLASTY: ICD-10-CM

## 2024-07-12 DIAGNOSIS — Z96.649 S/P HIP HEMIARTHROPLASTY: Primary | ICD-10-CM

## 2024-07-12 PROCEDURE — 73502 X-RAY EXAM HIP UNI 2-3 VIEWS: CPT | Mod: RT | Performed by: RADIOLOGY

## 2024-07-12 PROCEDURE — 99213 OFFICE O/P EST LOW 20 MIN: CPT | Performed by: ORTHOPAEDIC SURGERY

## 2024-07-12 NOTE — PROGRESS NOTES
This patient is about a year out from a right hip hemiarthroplasty for femoral neck fracture.  He has a history of a left total hip several years ago and also left forearm fracture that he sustained about 2 years ago.  He has a history of neuropathy associated with his diabetes.  This is a possible etiology of some of his falls.  The patient states that his hip pain has essentially resolved on the right side.  He states that he is taking calcium for his low bone density but had to hold his weekly medications as he needs some dental work done.    On examination he is alert and oriented.  He has a slow stiff gait and uses a cane.  He states that the cane is used at all times when outside of the house but never went inside the house.    I reviewed his x-rays which show stable right hip hemiarthroplasty and left total hip arthroplasty.  There is no sign of implant loosening or failure.    Our plan is to x-ray his right and left hips again in 12 months.  I have answered all the patient's questions.

## 2024-07-12 NOTE — LETTER
7/12/2024      Amos Walker  [address] [address]      Dear Colleague,    Thank you for referring your patient, Amos Walker, to the [facility] [place]. Please see a copy of my visit note below.    This patient is about a year out from a right hip hemiarthroplasty for femoral neck fracture.  He has a history of a left total hip several years ago and also left forearm fracture that he sustained about 2 years ago.  He has a history of neuropathy associated with his diabetes.  This is a possible etiology of some of his falls.  The patient states that his hip pain has essentially resolved on the right side.  He states that he is taking calcium for his low bone density but had to hold his weekly medications as he needs some dental work done.    On examination he is alert and oriented.  He has a slow stiff gait and uses a cane.  He states that the cane is used at all times when outside of the house but never went inside the house.    I reviewed his x-rays which show stable right hip hemiarthroplasty and left total hip arthroplasty.  There is no sign of implant loosening or failure.    Our plan is to x-ray his right and left hips again in 12 months.  I have answered all the patient's questions.        Abdi Keller MD

## 2024-07-12 NOTE — NURSING NOTE
Reason For Visit:   Chief Complaint   Patient presents with    RECHECK     Follow up x-rays S/P hip hemiarthroplasty       There were no vitals taken for this visit.    Pain Assessment  Patient Currently in Pain: Antione Manriquez CMA

## 2024-07-15 ENCOUNTER — MYC MEDICAL ADVICE (OUTPATIENT)
Dept: PODIATRY | Facility: CLINIC | Age: 79
End: 2024-07-15
Payer: COMMERCIAL

## 2024-07-18 ENCOUNTER — TELEPHONE (OUTPATIENT)
Dept: INTERNAL MEDICINE | Facility: CLINIC | Age: 79
End: 2024-07-18
Payer: COMMERCIAL

## 2024-07-18 NOTE — TELEPHONE ENCOUNTER
Called [facility], the order that they are asking about has been faxed 3 times and is documented in the chart. I said I will fax it a 4th time. If there is another fax number than what is listed on the order to please let us know. Fax number on form [phone]

## 2024-07-18 NOTE — TELEPHONE ENCOUNTER
[facility]    Phone Message    May a detailed message be left on voicemail: yes     Reason for Call: Christiana calling to see if the orders from April 12 and May 30th could be signed and faxed back ASAP. They have been numerous times and just now. Please call her back to discuss if needed.     Action Taken: Message routed to:  [facility]    Travel Screening: Not Applicable     Date of Service:

## 2024-07-23 ENCOUNTER — OFFICE VISIT (OUTPATIENT)
Dept: PODIATRY | Facility: CLINIC | Age: 79
End: 2024-07-23
Payer: COMMERCIAL

## 2024-07-23 DIAGNOSIS — L97.521 SKIN ULCER OF LEFT FOOT, LIMITED TO BREAKDOWN OF SKIN (H): ICD-10-CM

## 2024-07-23 DIAGNOSIS — E11.49 TYPE II OR UNSPECIFIED TYPE DIABETES MELLITUS WITH NEUROLOGICAL MANIFESTATIONS, NOT STATED AS UNCONTROLLED(250.60) (H): ICD-10-CM

## 2024-07-23 DIAGNOSIS — E11.51 DIABETES MELLITUS WITH PERIPHERAL VASCULAR DISEASE (H): Primary | ICD-10-CM

## 2024-07-23 DIAGNOSIS — E11.610 CHARCOT FOOT DUE TO DIABETES MELLITUS (H): ICD-10-CM

## 2024-07-23 PROCEDURE — 99214 OFFICE O/P EST MOD 30 MIN: CPT | Performed by: PODIATRIST
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
## 2024-07-26 ENCOUNTER — DOCUMENTATION ONLY (OUTPATIENT)
Dept: INTERNAL MEDICINE | Facility: CLINIC | Age: 79
End: 2024-07-26
Payer: COMMERCIAL

## 2024-07-26 NOTE — PROGRESS NOTES
Type of Form Received: Cristino Dwyer PT Cert 7/12-9/13    Form Received (Date) 7/25/24   Form Filled out Yes, faxed 8/12 & 9/16   Placed in provider folder Yes

## 2024-07-29 ENCOUNTER — OFFICE VISIT (OUTPATIENT)
Dept: PODIATRY | Facility: CLINIC | Age: 79
End: 2024-07-29
Payer: COMMERCIAL

## 2024-07-29 DIAGNOSIS — E11.49 TYPE II OR UNSPECIFIED TYPE DIABETES MELLITUS WITH NEUROLOGICAL MANIFESTATIONS, NOT STATED AS UNCONTROLLED(250.60) (H): ICD-10-CM

## 2024-07-29 DIAGNOSIS — I87.8 VENOUS STASIS: ICD-10-CM

## 2024-07-29 DIAGNOSIS — L97.521 SKIN ULCER OF LEFT FOOT, LIMITED TO BREAKDOWN OF SKIN (H): ICD-10-CM

## 2024-07-29 DIAGNOSIS — E11.51 DIABETES MELLITUS WITH PERIPHERAL VASCULAR DISEASE (H): Primary | ICD-10-CM

## 2024-07-29 DIAGNOSIS — E11.610 CHARCOT FOOT DUE TO DIABETES MELLITUS (H): ICD-10-CM

## 2024-07-29 PROCEDURE — 99214 OFFICE O/P EST MOD 30 MIN: CPT | Performed by: PODIATRIST
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
## 2024-07-29 NOTE — NURSING NOTE
Amos Walker's chief complaint for this visit includes:  Chief Complaint   Patient presents with    Consult     Bilateral foot and leg recheck     PCP: Racheal Swift    Referring Provider:  Brayan Mcclain DPM  [address] [place] [id number]    There were no vitals taken for this visit.  Data Unavailable     Do you need any medication refills at today's visit? NO    Allergies   Allergen Reactions    No Clinical Screening - See Comments      methylisothiazolinone    Seasonal Allergies     Simvastatin Other (See Comments)     Sun sensitivty    Methylisothiazolinone Rash    Neomycin      Wound gets worse    Povidone Iodine Rash       Chiquis Morris LPN

## 2024-08-07 ENCOUNTER — TRANSFERRED RECORDS (OUTPATIENT)
Dept: HEALTH INFORMATION MANAGEMENT | Facility: CLINIC | Age: 79
End: 2024-08-07
Payer: COMMERCIAL

## 2024-08-13 ENCOUNTER — OFFICE VISIT (OUTPATIENT)
Dept: PODIATRY | Facility: CLINIC | Age: 79
End: 2024-08-13
Payer: COMMERCIAL

## 2024-08-13 DIAGNOSIS — L97.321 SKIN ULCER OF LEFT ANKLE, LIMITED TO BREAKDOWN OF SKIN (H): ICD-10-CM

## 2024-08-13 DIAGNOSIS — E11.49 TYPE II OR UNSPECIFIED TYPE DIABETES MELLITUS WITH NEUROLOGICAL MANIFESTATIONS, NOT STATED AS UNCONTROLLED(250.60) (H): ICD-10-CM

## 2024-08-13 DIAGNOSIS — E11.610 CHARCOT FOOT DUE TO DIABETES MELLITUS (H): ICD-10-CM

## 2024-08-13 DIAGNOSIS — L97.521 SKIN ULCER OF LEFT FOOT, LIMITED TO BREAKDOWN OF SKIN (H): ICD-10-CM

## 2024-08-13 DIAGNOSIS — I87.8 VENOUS STASIS: ICD-10-CM

## 2024-08-13 DIAGNOSIS — E11.51 DIABETES MELLITUS WITH PERIPHERAL VASCULAR DISEASE (H): Primary | ICD-10-CM

## 2024-08-13 PROCEDURE — 99214 OFFICE O/P EST MOD 30 MIN: CPT | Performed by: PODIATRIST

## 2024-08-13 RX ORDER — LEVOFLOXACIN 750 MG/1
750 TABLET, FILM COATED ORAL DAILY
Qty: 14 TABLET | Refills: 0 | Status: SHIPPED | OUTPATIENT
Start: 2024-08-13
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
## 2024-08-13 NOTE — NURSING NOTE
Amos Walker's chief complaint for this visit includes:  Chief Complaint   Patient presents with    Consult     Leg recheck and wound care     PCP: Racheal Swift    Referring Provider:  Brayan Mcclain DPM  [address] [place] [phone]    There were no vitals taken for this visit.  Data Unavailable     Do you need any medication refills at today's visit? NO    Allergies   Allergen Reactions    No Clinical Screening - See Comments      methylisothiazolinone    Seasonal Allergies     Simvastatin Other (See Comments)     Sun sensitivty    Methylisothiazolinone Rash    Neomycin      Wound gets worse    Povidone Iodine Rash       Chiquis Morris LPN

## 2024-08-20 ENCOUNTER — OFFICE VISIT (OUTPATIENT)
Dept: PODIATRY | Facility: CLINIC | Age: 79
End: 2024-08-20
Payer: COMMERCIAL

## 2024-08-20 DIAGNOSIS — L97.521 SKIN ULCER OF LEFT FOOT, LIMITED TO BREAKDOWN OF SKIN (H): ICD-10-CM

## 2024-08-20 DIAGNOSIS — E11.49 TYPE II OR UNSPECIFIED TYPE DIABETES MELLITUS WITH NEUROLOGICAL MANIFESTATIONS, NOT STATED AS UNCONTROLLED(250.60) (H): ICD-10-CM

## 2024-08-20 DIAGNOSIS — E11.51 DIABETES MELLITUS WITH PERIPHERAL VASCULAR DISEASE (H): Primary | ICD-10-CM

## 2024-08-20 DIAGNOSIS — E11.610 CHARCOT FOOT DUE TO DIABETES MELLITUS (H): ICD-10-CM

## 2024-08-20 DIAGNOSIS — L97.322 SKIN ULCER OF LEFT ANKLE WITH FAT LAYER EXPOSED (H): ICD-10-CM

## 2024-08-20 PROCEDURE — 99214 OFFICE O/P EST MOD 30 MIN: CPT | Performed by: PODIATRIST

## 2024-08-20 RX ORDER — LEVOFLOXACIN 750 MG/1
750 TABLET, FILM COATED ORAL DAILY
Qty: 14 TABLET | Refills: 0 | Status: SHIPPED | OUTPATIENT
Start: 2024-08-20

## 2024-08-20 NOTE — NURSING NOTE
Amos Walker's chief complaint for this visit includes:  Chief Complaint   Patient presents with    Consult     Bilateral leg recheck and wound care     PCP: Racheal Swift    Referring Provider:  Referred Self, MD  No address on file    There were no vitals taken for this visit.  Data Unavailable     Do you need any medication refills at today's visit? NO    Allergies   Allergen Reactions    No Clinical Screening - See Comments      methylisothiazolinone    Seasonal Allergies     Simvastatin Other (See Comments)     Sun sensitivty    Methylisothiazolinone Rash    Neomycin      Wound gets worse    Povidone Iodine Rash       Chiquis Morris LPN
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
## 2024-08-27 ENCOUNTER — TELEPHONE (OUTPATIENT)
Dept: VASCULAR SURGERY | Facility: CLINIC | Age: 79
End: 2024-08-27

## 2024-08-29 DIAGNOSIS — E11.40 TYPE 2 DIABETES, CONTROLLED, WITH NEUROPATHY (H): ICD-10-CM

## 2024-09-03 ENCOUNTER — OFFICE VISIT (OUTPATIENT)
Dept: PODIATRY | Facility: CLINIC | Age: 79
End: 2024-09-03
Payer: COMMERCIAL

## 2024-09-03 DIAGNOSIS — L97.521 SKIN ULCER OF LEFT FOOT, LIMITED TO BREAKDOWN OF SKIN (H): ICD-10-CM

## 2024-09-03 DIAGNOSIS — I87.8 VENOUS STASIS: ICD-10-CM

## 2024-09-03 DIAGNOSIS — L97.322 SKIN ULCER OF LEFT ANKLE WITH FAT LAYER EXPOSED (H): ICD-10-CM

## 2024-09-03 DIAGNOSIS — E11.51 DIABETES MELLITUS WITH PERIPHERAL VASCULAR DISEASE (H): Primary | ICD-10-CM

## 2024-09-03 DIAGNOSIS — E11.610 CHARCOT FOOT DUE TO DIABETES MELLITUS (H): ICD-10-CM

## 2024-09-03 DIAGNOSIS — E11.49 TYPE II OR UNSPECIFIED TYPE DIABETES MELLITUS WITH NEUROLOGICAL MANIFESTATIONS, NOT STATED AS UNCONTROLLED(250.60) (H): ICD-10-CM

## 2024-09-03 PROCEDURE — 99214 OFFICE O/P EST MOD 30 MIN: CPT | Performed by: PODIATRIST
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
## 2024-09-03 NOTE — TELEPHONE ENCOUNTER
dulaglutide (TRULICITY) 1.5 MG/0.5ML pen       Last Written Prescription Date:  6/14/24  Last Fill Quantity: 6ml,   # refills: 3  Last Office Visit : 6/14/24  Future Office visit:  10/14/24    Routing refill request to provider for review/approval because:  GLP-1 Agonists Protocol [id number]/29/2024 09:37 AM   Protocol Details Has GFR on file in past 12 months and most recent value is normal   GFR 56   2/13/24    Laurel HUMPHREYS RN  P Central Nursing/Red Flag Triage & Med Refill Team

## 2024-09-05 RX ORDER — DULAGLUTIDE 1.5 MG/.5ML
1.5 INJECTION, SOLUTION SUBCUTANEOUS
Qty: 6 ML | Refills: 3 | OUTPATIENT
Start: 2024-09-05

## 2024-09-12 ENCOUNTER — MEDICAL CORRESPONDENCE (OUTPATIENT)
Dept: HEALTH INFORMATION MANAGEMENT | Facility: CLINIC | Age: 79
End: 2024-09-12
Payer: COMMERCIAL

## 2024-09-13 ENCOUNTER — DOCUMENTATION ONLY (OUTPATIENT)
Dept: INTERNAL MEDICINE | Facility: CLINIC | Age: 79
End: 2024-09-13
Payer: COMMERCIAL

## 2024-09-17 ENCOUNTER — OFFICE VISIT (OUTPATIENT)
Dept: PODIATRY | Facility: CLINIC | Age: 79
End: 2024-09-17
Payer: COMMERCIAL

## 2024-09-17 ENCOUNTER — DOCUMENTATION ONLY (OUTPATIENT)
Dept: INTERNAL MEDICINE | Facility: CLINIC | Age: 79
End: 2024-09-17

## 2024-09-17 DIAGNOSIS — E11.610 CHARCOT FOOT DUE TO DIABETES MELLITUS (H): ICD-10-CM

## 2024-09-17 DIAGNOSIS — E11.51 DIABETES MELLITUS WITH PERIPHERAL VASCULAR DISEASE (H): Primary | ICD-10-CM

## 2024-09-17 DIAGNOSIS — I87.8 VENOUS STASIS: ICD-10-CM

## 2024-09-17 DIAGNOSIS — L97.322 SKIN ULCER OF LEFT ANKLE WITH FAT LAYER EXPOSED (H): ICD-10-CM

## 2024-09-17 DIAGNOSIS — L97.521 SKIN ULCER OF LEFT FOOT, LIMITED TO BREAKDOWN OF SKIN (H): ICD-10-CM

## 2024-09-17 DIAGNOSIS — B35.1 ONYCHOMYCOSIS: ICD-10-CM

## 2024-09-17 DIAGNOSIS — L84 TYLOMA: ICD-10-CM

## 2024-09-17 DIAGNOSIS — E11.49 TYPE II OR UNSPECIFIED TYPE DIABETES MELLITUS WITH NEUROLOGICAL MANIFESTATIONS, NOT STATED AS UNCONTROLLED(250.60) (H): ICD-10-CM

## 2024-09-17 PROCEDURE — 11720 DEBRIDE NAIL 1-5: CPT | Mod: XS | Performed by: PODIATRIST

## 2024-09-17 PROCEDURE — 99214 OFFICE O/P EST MOD 30 MIN: CPT | Mod: 25 | Performed by: PODIATRIST

## 2024-09-17 PROCEDURE — 11055 PARING/CUTG B9 HYPRKER LES 1: CPT | Performed by: PODIATRIST
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
## 2024-09-17 NOTE — PROGRESS NOTES
Type of Form Received: Cristino Dwyer PT Discharge Summary    Form Received (Date) 9/12/24   Form Filled out Yes, faxed 9/25/24   Placed in provider folder Yes

## 2024-10-09 ENCOUNTER — OFFICE VISIT (OUTPATIENT)
Dept: PODIATRY | Facility: CLINIC | Age: 79
End: 2024-10-09
Payer: COMMERCIAL

## 2024-10-09 DIAGNOSIS — E11.51 DIABETES MELLITUS WITH PERIPHERAL VASCULAR DISEASE (H): Primary | ICD-10-CM

## 2024-10-09 DIAGNOSIS — L97.521 SKIN ULCER OF LEFT FOOT, LIMITED TO BREAKDOWN OF SKIN (H): ICD-10-CM

## 2024-10-09 DIAGNOSIS — E11.610 CHARCOT FOOT DUE TO DIABETES MELLITUS (H): ICD-10-CM

## 2024-10-09 DIAGNOSIS — E11.49 TYPE II OR UNSPECIFIED TYPE DIABETES MELLITUS WITH NEUROLOGICAL MANIFESTATIONS, NOT STATED AS UNCONTROLLED(250.60) (H): ICD-10-CM

## 2024-10-09 DIAGNOSIS — L97.322 SKIN ULCER OF LEFT ANKLE WITH FAT LAYER EXPOSED (H): ICD-10-CM

## 2024-10-09 DIAGNOSIS — I87.8 VENOUS STASIS: ICD-10-CM

## 2024-10-09 PROCEDURE — 11055 PARING/CUTG B9 HYPRKER LES 1: CPT | Performed by: PODIATRIST

## 2024-10-09 PROCEDURE — 99214 OFFICE O/P EST MOD 30 MIN: CPT | Mod: 25 | Performed by: PODIATRIST
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
## 2024-10-09 NOTE — NURSING NOTE
Amos Walker's chief complaint for this visit includes:  Chief Complaint   Patient presents with    RECHECK     Recheck wound.      PCP: Racheal Swift    Referring Provider:  Referred Self, MD  No address on file    There were no vitals taken for this visit.  Data Unavailable        Allergies   Allergen Reactions    No Clinical Screening - See Comments      methylisothiazolinone    Seasonal Allergies     Simvastatin Other (See Comments)     Sun sensitivty    Methylisothiazolinone Rash    Neomycin      Wound gets worse    Povidone Iodine Rash         Do you need any medication refills at today's visit?
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
## 2024-10-14 ENCOUNTER — OFFICE VISIT (OUTPATIENT)
Dept: INTERNAL MEDICINE | Facility: CLINIC | Age: 79
End: 2024-10-14
Payer: COMMERCIAL

## 2024-10-14 VITALS
RESPIRATION RATE: 14 BRPM | HEART RATE: 98 BPM | OXYGEN SATURATION: 100 % | WEIGHT: 180.2 LBS | DIASTOLIC BLOOD PRESSURE: 75 MMHG | SYSTOLIC BLOOD PRESSURE: 144 MMHG | BODY MASS INDEX: 22.41 KG/M2 | HEIGHT: 75 IN | TEMPERATURE: 97.9 F

## 2024-10-14 DIAGNOSIS — Z23 NEED FOR COVID-19 VACCINE: ICD-10-CM

## 2024-10-14 DIAGNOSIS — R25.1 TREMOR: ICD-10-CM

## 2024-10-14 DIAGNOSIS — Z12.11 SCREENING FOR COLON CANCER: ICD-10-CM

## 2024-10-14 DIAGNOSIS — E11.40 TYPE 2 DIABETES, CONTROLLED, WITH NEUROPATHY (H): ICD-10-CM

## 2024-10-14 DIAGNOSIS — L30.9 ECZEMA, UNSPECIFIED TYPE: Primary | ICD-10-CM

## 2024-10-14 DIAGNOSIS — I73.9 PERIPHERAL VASCULAR DISEASE, UNSPECIFIED (H): ICD-10-CM

## 2024-10-14 DIAGNOSIS — Z23 NEED FOR INFLUENZA VACCINATION: ICD-10-CM

## 2024-10-14 LAB
EST. AVERAGE GLUCOSE BLD GHB EST-MCNC: 134 MG/DL
HBA1C MFR BLD: 6.3 %

## 2024-10-14 PROCEDURE — G0008 ADMIN INFLUENZA VIRUS VAC: HCPCS | Performed by: INTERNAL MEDICINE

## 2024-10-14 PROCEDURE — 83036 HEMOGLOBIN GLYCOSYLATED A1C: CPT | Performed by: PATHOLOGY

## 2024-10-14 PROCEDURE — 90480 ADMN SARSCOV2 VAC 1/ONLY CMP: CPT | Performed by: INTERNAL MEDICINE

## 2024-10-14 PROCEDURE — 90662 IIV NO PRSV INCREASED AG IM: CPT | Performed by: INTERNAL MEDICINE

## 2024-10-14 PROCEDURE — 99214 OFFICE O/P EST MOD 30 MIN: CPT | Mod: 25 | Performed by: INTERNAL MEDICINE

## 2024-10-14 PROCEDURE — 91320 SARSCV2 VAC 30MCG TRS-SUC IM: CPT | Performed by: INTERNAL MEDICINE

## 2024-10-14 NOTE — PROGRESS NOTES
Assessment & Plan           No follow-ups on file.  A&P  Pt is receiving a flu and COVID vaccine today in clinic    Continue to monitor glucose levels at home, avoiding any additional doses of insulin right before bedtime to avoid hypoglycemia during the night.    Use Triamcinolone 0.1% cream on rashes daily until no longer pruritic, then use once every other day.     Tremor he is advised to follow-up with Neurology but defers for now    He is advised to schedule colo and CT lung screening but defers for now    Sarah colbert    Attending Addendum:  The PA student acted as scribe.  The patient was seen and examined with medical student.  The history and physical were independently verified by myself.  The above documentation represents our joint assessment and plan.  Racheal Swift MD  Internal Medicine    >30 minutes spent today performing chart review, history and exam, documentation and further activities as noted above, exclusive of any procedures or EKG interpretation    Follow up in 2 months       Mel Trinidad is a 79 year old, presenting for the following health issues:  Follow Up (Four months) and Diabetes      10/14/2024    11:22 AM   Additional Questions   Roomed by osei     History of Present Illness       Diabetes:   He presents for follow up of diabetes.   He is checking home blood glucose with a continuous glucose monitor.   He checks blood glucose before and after meals.  Blood glucose is sometimes over 200 and sometimes under 70. He is aware of hypoglycemia symptoms including blurred vision.    He has no concerns regarding his diabetes at this time.   He is not experiencing numbness or burning in feet, excessive thirst, blurry vision, weight changes or redness, sores or blisters on feet.           He eats 2-3 servings of fruits and vegetables daily.He consumes 0 sweetened beverage(s) daily.He exercises with enough effort to increase his heart rate 9 or less minutes per day.  He exercises  "with enough effort to increase his heart rate 3 or less days per week.   He is taking medications regularly.     He is dismayed that his insurance will no longer be accepted by [facility], will need to change insurance or doctors. Spoke with Social work for confirmation, and she mentioned if he is to get a new insurance, now is the time as enrollment is open. Otherwise [employer] accepts [employer] PPO.     A1c was 6.4 today. He reports a few lows.  He gets symptoms. This happened after taking lispro, he takes 7 with breakfast. He states he knows the signs of hypoglycemia and keeps glucose tablets on his person.     He reports PT told him he has a leg length discrepancy, recommended new orthotics. He mentioned he is planning to get an x-ray of both hips in the future, as he has not gotten imagining on his first hip replacement in a long time.     Still needs to schedule: [place], CT lung                    Objective    BP (!) 144/75 (BP Location: Left arm, Patient Position: Sitting, Cuff Size: Adult Regular)   Pulse 98   Temp 97.9  F (36.6  C) (Oral)   Resp 14   Ht 1.892 m (6' 2.5\")   Wt 81.7 kg (180 lb 3.2 oz)   SpO2 100%   BMI 22.83 kg/m    Body mass index is 22.83 kg/m .    Pt attributes elevated blood pressure today to rushing out the door and forgetting to take Lisinopril      Physical Exam   GENERAL: alert and no distress  CV: regular rate and rhythm, normal S1 S2, no S3 or S4, no murmur, click or rub, no peripheral edema   SKIN: Pt has several nummular eczema rashes throughout upper and lower extremities. Pt has 4x4 cm purple bruise on dorsal surface of hand.  MSK: intention tremor noted BL on finger nose proprioceptive test.              Signed Electronically by: Racheal Swift MD    "

## 2024-10-14 NOTE — PATIENT INSTRUCTIONS
To reach our financial counselors, please call: [phone] or toll free number [phone].      [employer]

## 2024-10-17 ENCOUNTER — MYC MEDICAL ADVICE (OUTPATIENT)
Dept: PODIATRY | Facility: CLINIC | Age: 79
End: 2024-10-17
Payer: COMMERCIAL

## 2024-10-21 ENCOUNTER — OFFICE VISIT (OUTPATIENT)
Dept: PODIATRY | Facility: CLINIC | Age: 79
End: 2024-10-21
Payer: COMMERCIAL

## 2024-10-21 DIAGNOSIS — L97.911 ULCER OF RIGHT LOWER EXTREMITY, LIMITED TO BREAKDOWN OF SKIN (H): ICD-10-CM

## 2024-10-21 DIAGNOSIS — I70.229 CRITICAL LOWER LIMB ISCHEMIA (H): ICD-10-CM

## 2024-10-21 DIAGNOSIS — I87.8 VENOUS STASIS: ICD-10-CM

## 2024-10-21 DIAGNOSIS — E11.49 TYPE II OR UNSPECIFIED TYPE DIABETES MELLITUS WITH NEUROLOGICAL MANIFESTATIONS, NOT STATED AS UNCONTROLLED(250.60) (H): ICD-10-CM

## 2024-10-21 DIAGNOSIS — E11.51 DIABETES MELLITUS WITH PERIPHERAL VASCULAR DISEASE (H): ICD-10-CM

## 2024-10-21 PROCEDURE — 99214 OFFICE O/P EST MOD 30 MIN: CPT | Performed by: PODIATRIST

## 2024-10-21 RX ORDER — SILVER SULFADIAZINE 10 MG/G
CREAM TOPICAL
Qty: 85 G | Refills: 11 | Status: SHIPPED | OUTPATIENT
Start: 2024-10-21

## 2024-10-21 RX ORDER — TRIAMCINOLONE ACETONIDE 1 MG/G
CREAM TOPICAL 2 TIMES DAILY PRN
Qty: 45 G | Refills: 1 | Status: SHIPPED | OUTPATIENT
Start: 2024-10-21

## 2024-10-21 RX ORDER — GENTAMICIN SULFATE 1 MG/G
CREAM TOPICAL DAILY
Qty: 60 G | Refills: 1 | Status: SHIPPED | OUTPATIENT
Start: 2024-10-21
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
## 2024-10-29 DIAGNOSIS — E11.40 TYPE 2 DIABETES, CONTROLLED, WITH NEUROPATHY (H): ICD-10-CM

## 2024-10-29 RX ORDER — PEN NEEDLE, DIABETIC 31 GX5/16"
NEEDLE, DISPOSABLE MISCELLANEOUS
Qty: 400 EACH | Refills: 3 | Status: SHIPPED | OUTPATIENT
Start: 2024-10-29

## 2024-10-29 NOTE — TELEPHONE ENCOUNTER
LVD:  10/14/2024  [facility]     Racheal Swift MD  Internal Medicine     Refilled per protocol.

## 2024-11-06 ENCOUNTER — OFFICE VISIT (OUTPATIENT)
Dept: PODIATRY | Facility: CLINIC | Age: 79
End: 2024-11-06
Payer: COMMERCIAL

## 2024-11-06 DIAGNOSIS — L97.322 SKIN ULCER OF LEFT ANKLE WITH FAT LAYER EXPOSED (H): ICD-10-CM

## 2024-11-06 DIAGNOSIS — E11.51 DIABETES MELLITUS WITH PERIPHERAL VASCULAR DISEASE (H): Primary | ICD-10-CM

## 2024-11-06 DIAGNOSIS — E11.49 TYPE II OR UNSPECIFIED TYPE DIABETES MELLITUS WITH NEUROLOGICAL MANIFESTATIONS, NOT STATED AS UNCONTROLLED(250.60) (H): ICD-10-CM

## 2024-11-06 DIAGNOSIS — I87.8 VENOUS STASIS: ICD-10-CM

## 2024-11-06 DIAGNOSIS — E11.610 CHARCOT FOOT DUE TO DIABETES MELLITUS (H): ICD-10-CM

## 2024-11-06 PROCEDURE — 99214 OFFICE O/P EST MOD 30 MIN: CPT | Performed by: PODIATRIST
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
## 2024-11-20 ENCOUNTER — OFFICE VISIT (OUTPATIENT)
Dept: PODIATRY | Facility: CLINIC | Age: 79
End: 2024-11-20
Payer: COMMERCIAL

## 2024-11-20 DIAGNOSIS — E11.51 DIABETES MELLITUS WITH PERIPHERAL VASCULAR DISEASE (H): Primary | ICD-10-CM

## 2024-11-20 DIAGNOSIS — E11.610 CHARCOT FOOT DUE TO DIABETES MELLITUS (H): ICD-10-CM

## 2024-11-20 DIAGNOSIS — E11.49 TYPE II OR UNSPECIFIED TYPE DIABETES MELLITUS WITH NEUROLOGICAL MANIFESTATIONS, NOT STATED AS UNCONTROLLED(250.60) (H): ICD-10-CM

## 2024-11-20 DIAGNOSIS — L97.322 SKIN ULCER OF LEFT ANKLE WITH FAT LAYER EXPOSED (H): ICD-10-CM

## 2024-11-20 DIAGNOSIS — I87.8 VENOUS STASIS: ICD-10-CM
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
## 2024-11-21 DIAGNOSIS — D47.2 MGUS (MONOCLONAL GAMMOPATHY OF UNKNOWN SIGNIFICANCE): Primary | ICD-10-CM

## 2024-11-22 NOTE — PROGRESS NOTES
Virtual Visit Details    Type of service:  Video Visit   Video Start Time: 222  Video End Time:  242      Originating Location (pt. Location): Home    Distant Location (provider location):  On-site  Platform used for Video Visit: Kellie          HISTORY OF PRESENT ILLNESS:   Mr. Walker is a 79-year-old gentleman referred for evaluation of anemia and monoclonal gammopathy.  He has a complicated medical history highlighted by type 2 diabetes, hypertension, coronary and peripheral artery disease, Charcot foot, chronic leg ulcers, chronic kidney disease and history of heavy tobacco use.  He has been noted to be anemic for quite a while.  He tells me that he was anemic several years ago when he was in [place].  He had some blood in the stool.  He did have colonoscopies done.  In reviewing his chart, he has had hemoglobins in the low 9/high 8 range on and off since 2017.  Occasionally his hemoglobin gets a little bit higher.  His most recent hemoglobin on 02/05 was 9.0.  He denies any active bleeding now.  He does have foot ulcers.  He has had a lot of problems with infections.  He has had a history of MRSA involving his foot which he feels led ultimately to his Charcot foot and his diabetic ulcers.  He has not had a history of B12 deficiency.  He was noted in 2019 to have a monoclonal peak of 0.2.  There were no light chains in the urine.  His immunoglobulin levels were elevated in IgA and IgM.  His IgA was 604 and IgG was 1890.  There is an immunofixation of the monoclonal peak, which showed a free light chain and kappa chain type in his serum and an IgG kappa also in the serum.  He had a rather severe infection in 01/2020 with sepsis, acute metabolic encephalopathy, respiratory failure secondary to Legionella community-acquired pneumonia.  He was treated aggressively with antibiotics and ultimately was discharged.  He has had issues with type 2 diabetes, on Lantus and Humalog.  He also has had hypertension.  He sees  Dr. Mcclain regularly for his right foot ulcer about every 2 weeks.  He has a Charcot foot.  He denies any fever or chills.  He denies any nausea, vomiting or diarrhea.     INTERVAL HISTORY  He has been feeling well The ulcer on the right foot is doing better followed with Dr Mcclain in Podiatry every 2 weeks  He has been taking trulicity and lispro for diabetes. He has gained weight up to 180lb AIC recently 6.4  Unfortunately 2 of his brothers  this past year.  PAST MEDICAL HISTORY:  Remarkable for coronary artery disease, chronic kidney disease, emphysema, heart disease, hyperlipidemia, peripheral artery disease, type 2 diabetes, venous ulcerations, chronic kidney disease, hypertension, atherosclerosis of native arteries of right leg, Charcot foot due to diabetes, angioplasty, a left total hip replacement, a femoral endarterectomy on the right, a cervical disk fusion, a right foot orthopedic surgery, and bilateral cataract surgery.      FAMILY HISTORY:  Remarkable for his father having colon cancer and kidney disease.  Mother had kidney and cardiac issues.  His son had a myocardial infarction at age 40.  His brother has macular degeneration.      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  He smokes 2 packs per day for at least 25 years.  No alcohol use.  He is .  He has 4 children.      MEDICATIONS:  See EMR.      REVIEW OF SYSTEMS:   12 pt ROS done and is negative  Except as in HPI     ALLERGIES:  Lisinopril, neomycin, methylchloroisothiazolinone, povidone-iodine.        PHYSICAL EXAMINATION:    By the video, he is an alert gentleman, verbal, in no acute distress.     EYES:  Grossly normal to inspection, no discharge, erythema or icterus.   SKIN:  Visible skin is clear.  No significant rash or abnormal pigmentation.   NEUROLOGIC:  Cranial nerves seem to be intact.  Mentation and speech is appropriate for age.   PSYCHIATRIC:  Mentation appears normal.  He does not seem anxious today.             1.  Chronic anemia, likely anemia of chronic inflammation.   2.  Monoclonal gammopathy of uncertain significance.   3.  Type 2 diabetes.   4.  Coronary artery disease.   5.  Peripheral artery disease.   6.   Right Charcot foot.  7.   Smoking   8.  Cellulitis   9. Right hip fx s/p partial replacement  10. Left hip Total hip replaced  11. Low IgM    . His hemoglobin is12.7 and remarkably his monoclonal protein is no longer present on the serum protein electrophoresis.  May be related to decreased inflammation with CRP,3.0.  Of note though he does have a low IgM which may be playing a role in his infection propensity.  That combined with diabetes suggest that if he were to  develop a fever he should probably get out on antibiotics sooner rather than later.He has been taking oral iron supplements and  his ferritin is 419 and iron saturation 36%. He should stop the ferrous sulfate.  He will follow-up with Dr. Swift and Dr. Mcclain. Will repeat labs in 12 mo and see him then     RTC 1 year  With labs    I spent a total of 20 minutes on the video and reviewing the charg with Amos Walker during today's office visit. Over 50% of this time was spent counseling the patient and coordinating the care regarding anemia and MGUS.  The longitudinal plan of care for the diagnosis(es)/condition(s) as documented were addressed during this visit. Due to the added complexity in care, I will continue to support Amos in the subsequent management and with ongoing continuity of care.      Kaleb Ramirez MD    Latest Reference Range & Units 11/25/24 14:46   Sodium 135 - 145 mmol/L 137   Potassium 3.4 - 5.3 mmol/L 4.0   Chloride 98 - 107 mmol/L 102   Carbon Dioxide (CO2) 22 - 29 mmol/L 27   Urea Nitrogen 8.0 - 23.0 mg/dL 35.2 (H)   Creatinine 0.67 - 1.17 mg/dL 1.28 (H)   GFR Estimate >60 mL/min/1.73m2 58 (L)   Calcium 8.8 - 10.4 mg/dL 9.0   Anion Gap 7 - 15 mmol/L 8   Albumin 3.5 - 5.2 g/dL 4.0   Protein Total 6.4 -  8.3 g/dL 7.1   Alkaline Phosphatase 40 - 150 U/L 94   ALT 0 - 70 U/L 10   AST 0 - 45 U/L 25   Bilirubin Total <=1.2 mg/dL 0.5   Glucose 70 - 99 mg/dL 170 (H)   Lactate Dehydrogenase 0 - 250 U/L 190   Uric Acid 3.4 - 7.0 mg/dL 6.4   WBC 4.0 - 11.0 10e3/uL 5.2   Hemoglobin 13.3 - 17.7 g/dL 12.7 (L)   Hematocrit 40.0 - 53.0 % 38.9 (L)   Platelet Count 150 - 450 10e3/uL 198   RBC Count 4.40 - 5.90 10e6/uL 3.96 (L)   MCV 78 - 100 fL 98   MCH 26.5 - 33.0 pg 32.1   MCHC 31.5 - 36.5 g/dL 32.6   RDW 10.0 - 15.0 % 13.2   % Neutrophils % 66   % Lymphocytes % 20   % Monocytes % 9   % Eosinophils % 3   % Basophils % 1   Absolute Basophils 0.0 - 0.2 10e3/uL 0.0   Absolute Eosinophils 0.0 - 0.7 10e3/uL 0.2   Absolute Immature Granulocytes <=0.4 10e3/uL 0.1   Absolute Lymphocytes 0.8 - 5.3 10e3/uL 1.1   Absolute Monocytes 0.0 - 1.3 10e3/uL 0.5   % Immature Granulocytes % 1   Absolute Neutrophils 1.6 - 8.3 10e3/uL 3.4   Albumin Fraction 3.7 - 5.1 g/dL 4.0   Alpha 1 Fraction 0.2 - 0.4 g/dL 0.2   Alpha 2 Fraction 0.5 - 0.9 g/dL 0.8   Beta Fraction 0.6 - 1.0 g/dL 0.7   ELP Interpretation:  No obvious monoclonal protein seen by capillary electrophoresis, however in the past a small monoclonal protein was seen by immunofixation which is a more sensitive method for monoclonal detection. Pathologic significance requires clinical correlation. JOLANTA Benjamin M.D., Ph.D., Pathologist.   Gamma Fraction 0.7 - 1.6 g/dL 1.2   IGA 84 - 499 mg/dL 301    - 1,616 mg/dL 1,222   IGM 35 - 242 mg/dL 20 (L)   [place] Free Lt Chain 0.33 - 1.94 mg/dL 5.92 (H)   Kappa Lambda Ratio 0.26 - 1.65  2.26 (H)   Lambda Free Lt Chain 0.57 - 2.63 mg/dL 2.62   Monoclonal Peak <=0.0 g/dL 0.0   Total Protein Serum for ELP 6.4 - 8.3 g/dL 6.9   (H): Data is abnormally high  (L): Data is abnormally low   Latest Reference Range & Units 11/25/24 14:46   CRP Inflammation <5.00 mg/L <3.00   Ferritin 31 - 409 ng/mL 419 (H)   Glucose 70 - 99 mg/dL 170 (H)   Iron 61 - 157  ug/dL 85   Iron Binding Capacity 240 - 430 ug/dL 233 (L)   Iron Sat Index 15 - 46 % 36   (H): Data is abnormally high  (L): Data is abnormally low

## 2024-11-25 ENCOUNTER — TELEPHONE (OUTPATIENT)
Dept: OPHTHALMOLOGY | Facility: CLINIC | Age: 79
End: 2024-11-25

## 2024-11-25 ENCOUNTER — LAB (OUTPATIENT)
Dept: LAB | Facility: CLINIC | Age: 79
End: 2024-11-25
Payer: COMMERCIAL

## 2024-11-25 DIAGNOSIS — E61.1 IRON DEFICIENCY: ICD-10-CM

## 2024-11-25 DIAGNOSIS — D47.2 MGUS (MONOCLONAL GAMMOPATHY OF UNKNOWN SIGNIFICANCE): ICD-10-CM

## 2024-11-25 LAB
BASOPHILS # BLD AUTO: 0 10E3/UL (ref 0–0.2)
BASOPHILS NFR BLD AUTO: 1 %
EOSINOPHIL # BLD AUTO: 0.2 10E3/UL (ref 0–0.7)
EOSINOPHIL NFR BLD AUTO: 3 %
ERYTHROCYTE [DISTWIDTH] IN BLOOD BY AUTOMATED COUNT: 13.2 % (ref 10–15)
HCT VFR BLD AUTO: 38.9 % (ref 40–53)
HGB BLD-MCNC: 12.7 G/DL (ref 13.3–17.7)
IMM GRANULOCYTES # BLD: 0.1 10E3/UL
IMM GRANULOCYTES NFR BLD: 1 %
LYMPHOCYTES # BLD AUTO: 1.1 10E3/UL (ref 0.8–5.3)
LYMPHOCYTES NFR BLD AUTO: 20 %
MCH RBC QN AUTO: 32.1 PG (ref 26.5–33)
MCHC RBC AUTO-ENTMCNC: 32.6 G/DL (ref 31.5–36.5)
MCV RBC AUTO: 98 FL (ref 78–100)
MONOCYTES # BLD AUTO: 0.5 10E3/UL (ref 0–1.3)
MONOCYTES NFR BLD AUTO: 9 %
NEUTROPHILS # BLD AUTO: 3.4 10E3/UL (ref 1.6–8.3)
NEUTROPHILS NFR BLD AUTO: 66 %
PLATELET # BLD AUTO: 198 10E3/UL (ref 150–450)
RBC # BLD AUTO: 3.96 10E6/UL (ref 4.4–5.9)
WBC # BLD AUTO: 5.2 10E3/UL (ref 4–11)

## 2024-11-25 PROCEDURE — 82784 ASSAY IGA/IGD/IGG/IGM EACH: CPT

## 2024-11-25 PROCEDURE — 85025 COMPLETE CBC W/AUTO DIFF WBC: CPT

## 2024-11-25 PROCEDURE — 84550 ASSAY OF BLOOD/URIC ACID: CPT

## 2024-11-25 PROCEDURE — 82728 ASSAY OF FERRITIN: CPT

## 2024-11-25 PROCEDURE — 36415 COLL VENOUS BLD VENIPUNCTURE: CPT

## 2024-11-25 PROCEDURE — 83550 IRON BINDING TEST: CPT

## 2024-11-25 PROCEDURE — 84155 ASSAY OF PROTEIN SERUM: CPT

## 2024-11-25 PROCEDURE — 83521 IG LIGHT CHAINS FREE EACH: CPT

## 2024-11-25 PROCEDURE — 83615 LACTATE (LD) (LDH) ENZYME: CPT

## 2024-11-25 PROCEDURE — 84165 PROTEIN E-PHORESIS SERUM: CPT | Performed by: PATHOLOGY

## 2024-11-25 PROCEDURE — 83540 ASSAY OF IRON: CPT

## 2024-11-25 PROCEDURE — 86140 C-REACTIVE PROTEIN: CPT

## 2024-11-26 LAB
ALBUMIN SERPL BCG-MCNC: 4 G/DL (ref 3.5–5.2)
ALBUMIN SERPL ELPH-MCNC: 4 G/DL (ref 3.7–5.1)
ALP SERPL-CCNC: 94 U/L (ref 40–150)
ALPHA1 GLOB SERPL ELPH-MCNC: 0.2 G/DL (ref 0.2–0.4)
ALPHA2 GLOB SERPL ELPH-MCNC: 0.8 G/DL (ref 0.5–0.9)
ALT SERPL W P-5'-P-CCNC: 10 U/L (ref 0–70)
ANION GAP SERPL CALCULATED.3IONS-SCNC: 8 MMOL/L (ref 7–15)
AST SERPL W P-5'-P-CCNC: 25 U/L (ref 0–45)
B-GLOBULIN SERPL ELPH-MCNC: 0.7 G/DL (ref 0.6–1)
BILIRUB SERPL-MCNC: 0.5 MG/DL
BUN SERPL-MCNC: 35.2 MG/DL (ref 8–23)
CALCIUM SERPL-MCNC: 9 MG/DL (ref 8.8–10.4)
CHLORIDE SERPL-SCNC: 102 MMOL/L (ref 98–107)
CREAT SERPL-MCNC: 1.28 MG/DL (ref 0.67–1.17)
EGFRCR SERPLBLD CKD-EPI 2021: 58 ML/MIN/1.73M2
GAMMA GLOB SERPL ELPH-MCNC: 1.2 G/DL (ref 0.7–1.6)
GLUCOSE SERPL-MCNC: 170 MG/DL (ref 70–99)
HCO3 SERPL-SCNC: 27 MMOL/L (ref 22–29)
IGA SERPL-MCNC: 301 MG/DL (ref 84–499)
IGG SERPL-MCNC: 1222 MG/DL (ref 610–1616)
IGM SERPL-MCNC: 20 MG/DL (ref 35–242)
KAPPA LC FREE SER-MCNC: 5.92 MG/DL (ref 0.33–1.94)
KAPPA LC FREE/LAMBDA FREE SER NEPH: 2.26 {RATIO} (ref 0.26–1.65)
LAMBDA LC FREE SERPL-MCNC: 2.62 MG/DL (ref 0.57–2.63)
LDH SERPL L TO P-CCNC: 190 U/L (ref 0–250)
M PROTEIN SERPL ELPH-MCNC: 0 G/DL
POTASSIUM SERPL-SCNC: 4 MMOL/L (ref 3.4–5.3)
PROT PATTERN SERPL ELPH-IMP: NORMAL
PROT SERPL-MCNC: 7.1 G/DL (ref 6.4–8.3)
SODIUM SERPL-SCNC: 137 MMOL/L (ref 135–145)
TOTAL PROTEIN SERUM FOR ELP: 6.9 G/DL (ref 6.4–8.3)
URATE SERPL-MCNC: 6.4 MG/DL (ref 3.4–7)

## 2024-11-27 ENCOUNTER — VIRTUAL VISIT (OUTPATIENT)
Dept: TRANSPLANT | Facility: CLINIC | Age: 79
End: 2024-11-27
Attending: INTERNAL MEDICINE
Payer: COMMERCIAL

## 2024-11-27 VITALS — HEIGHT: 75 IN | BODY MASS INDEX: 22.83 KG/M2

## 2024-11-27 DIAGNOSIS — E61.1 IRON DEFICIENCY: ICD-10-CM

## 2024-11-27 DIAGNOSIS — D47.2 MGUS (MONOCLONAL GAMMOPATHY OF UNKNOWN SIGNIFICANCE): Primary | ICD-10-CM

## 2024-11-27 LAB
CRP SERPL-MCNC: <3 MG/L
FERRITIN SERPL-MCNC: 419 NG/ML (ref 31–409)
IRON BINDING CAPACITY (ROCHE): 233 UG/DL (ref 240–430)
IRON SATN MFR SERPL: 36 % (ref 15–46)
IRON SERPL-MCNC: 85 UG/DL (ref 61–157)

## 2024-11-27 ASSESSMENT — PAIN SCALES - GENERAL: PAINLEVEL_OUTOF10: MILD PAIN (2)

## 2024-11-27 NOTE — LETTER
11/27/2024      Amos Walker  [address] [facility] [address]      Dear Colleague,    Thank you for referring your patient, Amos Walker, to the [facility] [place]. Please see a copy of my visit note below.    Virtual Visit Details    Type of service:  Video Visit   Video Start Time: 222  Video End Time:  242      Originating Location (pt. Location): Home    Distant Location (provider location):  On-site  Platform used for Video Visit: Ellie          HISTORY OF PRESENT ILLNESS:   Mr. Walker is a 79-year-old gentleman referred for evaluation of anemia and monoclonal gammopathy.  He has a complicated medical history highlighted by type 2 diabetes, hypertension, coronary and peripheral artery disease, Charcot foot, chronic leg ulcers, chronic kidney disease and history of heavy tobacco use.  He has been noted to be anemic for quite a while.  He tells me that he was anemic several years ago when he was in [place].  He had some blood in the stool.  He did have colonoscopies done.  In reviewing his chart, he has had hemoglobins in the low 9/high 8 range on and off since 2017.  Occasionally his hemoglobin gets a little bit higher.  His most recent hemoglobin on 02/05 was 9.0.  He denies any active bleeding now.  He does have foot ulcers.  He has had a lot of problems with infections.  He has had a history of MRSA involving his foot which he feels led ultimately to his Charcot foot and his diabetic ulcers.  He has not had a history of B12 deficiency.  He was noted in 2019 to have a monoclonal peak of 0.2.  There were no light chains in the urine.  His immunoglobulin levels were elevated in IgA and IgM.  His IgA was 604 and IgG was 1890.  There is an immunofixation of the monoclonal peak, which showed a free light chain and kappa chain type in his serum and an IgG kappa also in the serum.  He had a rather severe infection in 01/2020 with sepsis, acute metabolic  encephalopathy, respiratory failure secondary to Legionella community-acquired pneumonia.  He was treated aggressively with antibiotics and ultimately was discharged.  He has had issues with type 2 diabetes, on Lantus and Humalog.  He also has had hypertension.  He sees Dr. Mcclain regularly for his right foot ulcer about every 2 weeks.  He has a Charcot foot.  He denies any fever or chills.  He denies any nausea, vomiting or diarrhea.     INTERVAL HISTORY  He has been feeling well The ulcer on the right foot is doing better followed with Dr Mcclain in Podiatry every 2 weeks  He has been taking trulicity and lispro for diabetes. He has gained weight up to 180lb AIC recently 6.4  Unfortunately 2 of his brothers  this past year.  PAST MEDICAL HISTORY:  Remarkable for coronary artery disease, chronic kidney disease, emphysema, heart disease, hyperlipidemia, peripheral artery disease, type 2 diabetes, venous ulcerations, chronic kidney disease, hypertension, atherosclerosis of native arteries of right leg, Charcot foot due to diabetes, angioplasty, a left total hip replacement, a femoral endarterectomy on the right, a cervical disk fusion, a right foot orthopedic surgery, and bilateral cataract surgery.      FAMILY HISTORY:  Remarkable for his father having colon cancer and kidney disease.  Mother had kidney and cardiac issues.  His son had a myocardial infarction at age 40.  His brother has macular degeneration.      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  He smokes 2 packs per day for at least 25 years.  No alcohol use.  He is .  He has 4 children.      MEDICATIONS:  See EMR.      REVIEW OF SYSTEMS:   12 pt ROS done and is negative  Except as in HPI     ALLERGIES:  Lisinopril, neomycin, methylchloroisothiazolinone, povidone-iodine.        PHYSICAL EXAMINATION:    By the video, he is an alert gentleman, verbal, in no acute distress.     EYES:  Grossly normal to inspection, no discharge,  erythema or icterus.   SKIN:  Visible skin is clear.  No significant rash or abnormal pigmentation.   NEUROLOGIC:  Cranial nerves seem to be intact.  Mentation and speech is appropriate for age.   PSYCHIATRIC:  Mentation appears normal.  He does not seem anxious today.            1.  Chronic anemia, likely anemia of chronic inflammation.   2.  Monoclonal gammopathy of uncertain significance.   3.  Type 2 diabetes.   4.  Coronary artery disease.   5.  Peripheral artery disease.   6.   Right Charcot foot.  7.   Smoking   8.  Cellulitis   9. Right hip fx s/p partial replacement  10. Left hip Total hip replaced  11. Low IgM    . His hemoglobin is12.7 and remarkably his monoclonal protein is no longer present on the serum protein electrophoresis.  May be related to decreased inflammation.  Of note though he does have a low IgM which may be playing a role in his infection propensity.  That combined with diabetes suggest that if he were to d develop a fever he should probably get out on antibiotics sooner rather than later.he has been taking oral iron supplements I will check his ferritin and iron saturation to see whether or not this needs to be continued.  He will follow-up with Dr. Swift and Dr. Mcclain. Will repeat labs in 12 mo and see him then     RTC 1 year  With labs    I spent a total of 20 minutes on the video and reviewing the charg with Amos Walker during today's office visit. Over 50% of this time was spent counseling the patient and coordinating the care regarding anemia and MGUS.  The longitudinal plan of care for the diagnosis(es)/condition(s) as documented were addressed during this visit. Due to the added complexity in care, I will continue to support Amos in the subsequent management and with ongoing continuity of care.      Kaleb Ramirez MD    Latest Reference Range & Units 11/25/24 14:46   Sodium 135 - 145 mmol/L 137   Potassium 3.4 - 5.3 mmol/L 4.0   Chloride 98 - 107 mmol/L 102    Carbon Dioxide (CO2) 22 - 29 mmol/L 27   Urea Nitrogen 8.0 - 23.0 mg/dL 35.2 (H)   Creatinine 0.67 - 1.17 mg/dL 1.28 (H)   GFR Estimate >60 mL/min/1.73m2 58 (L)   Calcium 8.8 - 10.4 mg/dL 9.0   Anion Gap 7 - 15 mmol/L 8   Albumin 3.5 - 5.2 g/dL 4.0   Protein Total 6.4 - 8.3 g/dL 7.1   Alkaline Phosphatase 40 - 150 U/L 94   ALT 0 - 70 U/L 10   AST 0 - 45 U/L 25   Bilirubin Total <=1.2 mg/dL 0.5   Glucose 70 - 99 mg/dL 170 (H)   Lactate Dehydrogenase 0 - 250 U/L 190   Uric Acid 3.4 - 7.0 mg/dL 6.4   WBC 4.0 - 11.0 10e3/uL 5.2   Hemoglobin 13.3 - 17.7 g/dL 12.7 (L)   Hematocrit 40.0 - 53.0 % 38.9 (L)   Platelet Count 150 - 450 10e3/uL 198   RBC Count 4.40 - 5.90 10e6/uL 3.96 (L)   MCV 78 - 100 fL 98   MCH 26.5 - 33.0 pg 32.1   MCHC 31.5 - 36.5 g/dL 32.6   RDW 10.0 - 15.0 % 13.2   % Neutrophils % 66   % Lymphocytes % 20   % Monocytes % 9   % Eosinophils % 3   % Basophils % 1   Absolute Basophils 0.0 - 0.2 10e3/uL 0.0   Absolute Eosinophils 0.0 - 0.7 10e3/uL 0.2   Absolute Immature Granulocytes <=0.4 10e3/uL 0.1   Absolute Lymphocytes 0.8 - 5.3 10e3/uL 1.1   Absolute Monocytes 0.0 - 1.3 10e3/uL 0.5   % Immature Granulocytes % 1   Absolute Neutrophils 1.6 - 8.3 10e3/uL 3.4   Albumin Fraction 3.7 - 5.1 g/dL 4.0   Alpha 1 Fraction 0.2 - 0.4 g/dL 0.2   Alpha 2 Fraction 0.5 - 0.9 g/dL 0.8   Beta Fraction 0.6 - 1.0 g/dL 0.7   ELP Interpretation:  No obvious monoclonal protein seen by capillary electrophoresis, however in the past a small monoclonal protein was seen by immunofixation which is a more sensitive method for monoclonal detection. Pathologic significance requires clinical correlation. JOLANTA Benjamin M.D., Ph.D., Pathologist.   Gamma Fraction 0.7 - 1.6 g/dL 1.2   IGA 84 - 499 mg/dL 301    - 1,616 mg/dL 1,222   IGM 35 - 242 mg/dL 20 (L)   [place] Free Lt Chain 0.33 - 1.94 mg/dL 5.92 (H)   Kappa Lambda Ratio 0.26 - 1.65  2.26 (H)   Lambda Free Lt Chain 0.57 - 2.63 mg/dL 2.62   Monoclonal Peak <=0.0 g/dL 0.0    Total Protein Serum for ELP 6.4 - 8.3 g/dL 6.9   (H): Data is abnormally high  (L): Data is abnormally low    Again, thank you for allowing me to participate in the care of your patient.        Sincerely,        Kaleb Ramirez MD

## 2024-11-27 NOTE — NURSING NOTE
Current patient location: [address] [place] [id number]    Is the patient currently in the state of [place]? YES    Visit mode:VIDEO    If the visit is dropped, the patient can be reconnected by:VIDEO VISIT: Send to e-mail at: [email or web address]    Will anyone else be joining the visit? NO  (If patient encounters technical issues they should call [phone])    Are changes needed to the allergy or medication list?  Yes  Pt states he takes 5mg of Lisinopril daily.     Are refills needed on medications prescribed by this physician? NO    Rooming Documentation:  Questionnaire(s) completed    Weight 180 lbs (weight not taken within 24 hours)    Reason for visit: RECHECK (Return CCSL)    Fely HOPSON

## 2024-12-04 ENCOUNTER — OFFICE VISIT (OUTPATIENT)
Dept: PODIATRY | Facility: CLINIC | Age: 79
End: 2024-12-04
Payer: COMMERCIAL

## 2024-12-04 DIAGNOSIS — E11.49 TYPE II OR UNSPECIFIED TYPE DIABETES MELLITUS WITH NEUROLOGICAL MANIFESTATIONS, NOT STATED AS UNCONTROLLED(250.60) (H): ICD-10-CM

## 2024-12-04 DIAGNOSIS — E11.51 DIABETES MELLITUS WITH PERIPHERAL VASCULAR DISEASE (H): Primary | ICD-10-CM

## 2024-12-04 DIAGNOSIS — E11.610 CHARCOT FOOT DUE TO DIABETES MELLITUS (H): ICD-10-CM

## 2024-12-04 DIAGNOSIS — L97.322 SKIN ULCER OF LEFT ANKLE WITH FAT LAYER EXPOSED (H): ICD-10-CM

## 2024-12-04 DIAGNOSIS — I87.8 VENOUS STASIS: ICD-10-CM

## 2024-12-04 PROCEDURE — 11055 PARING/CUTG B9 HYPRKER LES 1: CPT | Performed by: PODIATRIST

## 2024-12-04 PROCEDURE — 99214 OFFICE O/P EST MOD 30 MIN: CPT | Mod: 25 | Performed by: PODIATRIST
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
## 2024-12-30 ENCOUNTER — OFFICE VISIT (OUTPATIENT)
Dept: PODIATRY | Facility: CLINIC | Age: 79
End: 2024-12-30
Payer: COMMERCIAL

## 2024-12-30 DIAGNOSIS — L97.322 SKIN ULCER OF LEFT ANKLE WITH FAT LAYER EXPOSED (H): ICD-10-CM

## 2024-12-30 DIAGNOSIS — I87.8 VENOUS STASIS: ICD-10-CM

## 2024-12-30 DIAGNOSIS — E11.610 CHARCOT FOOT DUE TO DIABETES MELLITUS (H): ICD-10-CM

## 2024-12-30 DIAGNOSIS — E11.51 DIABETES MELLITUS WITH PERIPHERAL VASCULAR DISEASE (H): Primary | ICD-10-CM

## 2024-12-30 DIAGNOSIS — E11.49 TYPE II OR UNSPECIFIED TYPE DIABETES MELLITUS WITH NEUROLOGICAL MANIFESTATIONS, NOT STATED AS UNCONTROLLED(250.60) (H): ICD-10-CM

## 2024-12-30 PROCEDURE — 99214 OFFICE O/P EST MOD 30 MIN: CPT | Performed by: PODIATRIST

## 2024-12-30 RX ORDER — LEVOFLOXACIN 750 MG/1
750 TABLET, FILM COATED ORAL DAILY
Qty: 14 TABLET | Refills: 0 | Status: SHIPPED | OUTPATIENT
Start: 2024-12-30
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
## 2025-01-13 ENCOUNTER — DOCUMENTATION ONLY (OUTPATIENT)
Dept: INTERNAL MEDICINE | Facility: CLINIC | Age: 80
End: 2025-01-13
Payer: COMMERCIAL

## 2025-01-13 NOTE — PROGRESS NOTES
Type of Form Received: VICTORINO KIM Request for Additional Information     Form Received (Date) 1/12/25   Form Filled out No   Placed in provider folder Yes

## 2025-01-14 ENCOUNTER — TELEPHONE (OUTPATIENT)
Dept: INTERNAL MEDICINE | Facility: CLINIC | Age: 80
End: 2025-01-14
Payer: COMMERCIAL

## 2025-01-14 NOTE — TELEPHONE ENCOUNTER
[facility] Call Center    Phone Message    May a detailed message be left on voicemail: yes     Reason for Call: Other: Group medicare requires clinical information in order to approve PA.       dulaglutide (TRULICITY) 1.5 MG/0.5ML pen [[id number]]   Ref [id number]    Action Taken: Other: pcc    Travel Screening: Not Applicable     Date of Service:

## 2025-01-15 ENCOUNTER — DOCUMENTATION ONLY (OUTPATIENT)
Dept: AUDIOLOGY | Facility: CLINIC | Age: 80
End: 2025-01-15
Payer: COMMERCIAL

## 2025-01-16 NOTE — TELEPHONE ENCOUNTER
The [facility] RN called spoke with Vickie LEE with [employer]. Per Vickie, the Prior Authorization for dulaglutide (TRULICITY) 1.5 MG/0.5ML pen was approved yesterday (&backdated) to be approved from: 1/1/2025 to 1/15/2026. iVckie stated to the RN that there was no additional information needed from the [facility] at this time.

## 2025-01-16 NOTE — PROGRESS NOTES
The patient dropped off his right hearing aid [facility] with significant distortion. It is being sent to the  for warranty repair today. The patient will be contacted when the repaired device is back and ready for .

## 2025-01-20 ENCOUNTER — OFFICE VISIT (OUTPATIENT)
Dept: PODIATRY | Facility: CLINIC | Age: 80
End: 2025-01-20
Payer: COMMERCIAL

## 2025-01-20 DIAGNOSIS — E11.49 TYPE II OR UNSPECIFIED TYPE DIABETES MELLITUS WITH NEUROLOGICAL MANIFESTATIONS, NOT STATED AS UNCONTROLLED(250.60) (H): ICD-10-CM

## 2025-01-20 DIAGNOSIS — E11.610 CHARCOT FOOT DUE TO DIABETES MELLITUS (H): ICD-10-CM

## 2025-01-20 DIAGNOSIS — L97.322 SKIN ULCER OF LEFT ANKLE WITH FAT LAYER EXPOSED (H): ICD-10-CM

## 2025-01-20 DIAGNOSIS — I87.8 VENOUS STASIS: ICD-10-CM

## 2025-01-20 DIAGNOSIS — E11.51 DIABETES MELLITUS WITH PERIPHERAL VASCULAR DISEASE (H): ICD-10-CM

## 2025-01-20 PROCEDURE — 99214 OFFICE O/P EST MOD 30 MIN: CPT | Performed by: PODIATRIST

## 2025-01-20 RX ORDER — LEVOFLOXACIN 750 MG/1
750 TABLET, FILM COATED ORAL DAILY
Qty: 14 TABLET | Refills: 0 | Status: SHIPPED | OUTPATIENT
Start: 2025-01-20
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
## 2025-01-20 NOTE — NURSING NOTE
Amos Walker's chief complaint for this visit includes:  Chief Complaint   Patient presents with    Consult     Bilateral foot and leg recheck     PCP: Racheal Swift    Referring Provider:  Referred Self, MD  No address on file    There were no vitals taken for this visit.  Data Unavailable     Do you need any medication refills at today's visit? NO    Allergies   Allergen Reactions    No Clinical Screening - See Comments      methylisothiazolinone    Seasonal Allergies     Simvastatin Other (See Comments)     Sun sensitivty    Methylisothiazolinone Rash    Neomycin      Wound gets worse    Povidone Iodine Rash       Chiquis Morris LPN

## 2025-01-27 ENCOUNTER — OFFICE VISIT (OUTPATIENT)
Dept: INTERNAL MEDICINE | Facility: CLINIC | Age: 80
End: 2025-01-27
Payer: COMMERCIAL

## 2025-01-27 VITALS
OXYGEN SATURATION: 97 % | HEIGHT: 75 IN | HEART RATE: 100 BPM | DIASTOLIC BLOOD PRESSURE: 64 MMHG | SYSTOLIC BLOOD PRESSURE: 115 MMHG | WEIGHT: 185.2 LBS | BODY MASS INDEX: 23.03 KG/M2 | TEMPERATURE: 98 F | RESPIRATION RATE: 18 BRPM

## 2025-01-27 DIAGNOSIS — Z29.11 NEED FOR VACCINATION AGAINST RESPIRATORY SYNCYTIAL VIRUS: ICD-10-CM

## 2025-01-27 DIAGNOSIS — Z00.00 HEALTH CARE MAINTENANCE: ICD-10-CM

## 2025-01-27 DIAGNOSIS — E11.610 CHARCOT FOOT DUE TO DIABETES MELLITUS (H): Primary | ICD-10-CM

## 2025-01-27 DIAGNOSIS — E11.40 TYPE 2 DIABETES, CONTROLLED, WITH NEUROPATHY (H): ICD-10-CM

## 2025-01-27 DIAGNOSIS — I10 PRIMARY HYPERTENSION: ICD-10-CM

## 2025-01-27 DIAGNOSIS — N18.30 STAGE 3 CHRONIC KIDNEY DISEASE, UNSPECIFIED WHETHER STAGE 3A OR 3B CKD (H): ICD-10-CM

## 2025-01-27 LAB
EST. AVERAGE GLUCOSE BLD GHB EST-MCNC: 146 MG/DL
HBA1C MFR BLD: 6.7 %

## 2025-01-27 PROCEDURE — 83036 HEMOGLOBIN GLYCOSYLATED A1C: CPT | Performed by: PATHOLOGY

## 2025-01-27 RX ORDER — LISINOPRIL 2.5 MG/1
7.5 TABLET ORAL DAILY
Qty: 270 TABLET | Refills: 3 | Status: SHIPPED | OUTPATIENT
Start: 2025-01-27

## 2025-01-27 NOTE — PROGRESS NOTES
Assessment & Plan     Need for vaccination against respiratory syncytial virus  Advised at pharmacy    Charcot foot due to diabetes mellitus (H)  - Lipid panel reflex to direct LDL Non-fasting; Future    Stage 3 chronic kidney disease, unspecified whether stage 3a or 3b CKD (H)  - Albumin Random Urine Quantitative with Creat Ratio; Future    Primary hypertension  - lisinopril (ZESTRIL) 2.5 MG tablet; Take 3 tablets (7.5 mg) by mouth daily.    Health care maintenance  Reviewed HCM due  - REVIEW OF HEALTH MAINTENANCE PROTOCOL ORDERS    Type 2 diabetes, controlled, with neuropathy (H)  Well controlled, needs annual eye visit, following with podiatry. Cont asa, statin, acei, insulin, GLP1a.  - Adult Eye  Referral; Future    >30 minutes spent today performing chart review, history and exam, documentation and further activities as noted above, exclusive of any procedures or EKG interpretation    The longitudinal plan of care for the diagnosis(es)/condition(s) as documented were addressed during this visit. Due to the added complexity in care, I will continue to support Amos in the subsequent management and with ongoing continuity of care.          Return in about 4 months (around 5/27/2025) for in person, with me.    Mel Trinidad is a 79 year old, presenting for the following health issues:  Follow Up (Fall. Bruise on buttocks and arm./Hand tremor diminish when taking vit b/Medication review. Insulin, Lisinopril./Discuss visit with Dr Ramirez.)      1/27/2025    10:28 AM   Additional Questions   Roomed by KTR     History of Present Illness       Reason for visit:  Follow up on diabetes   He is taking medications regularly.     PMH involving tobacco use, DM, HTN, CAD, PAD, Right Charcot foot, diabetic neuropathy, emphysema, tobacco use, anemia of chronic disease, MGUS, osteoporosis    He saw Dr. Ramirez, iron was replete, CRP low, M spike resolved, recommended stop iron supplementation and  follow-up in 1 year, he takes vit B for hand tremor    Continues to follow with podiatry for chronic foot wounds, last saw Vascular in May 2025, he's on DAPT    Continues to see Podiatry, gets close medical management for recurrent chronic foot/leg wounds    He feel recently, hit elbow, hand, feels like a bruise, he's not taking fosamax because due for implants    Weight is increased, last A1c was 6.7 today, uses trulicity and insulin, needs to switch to novolog after switching to [employer]    Still needs to schedule screening colo and CT lung    Medical Problems:  DM2: C/b neuropathy, charcot foot, chronic wounds, CKD, macrovascular disease, On insulin, trulicity, A1c tightly controlled, if not perhaps too low at times, discussed risks of  hypoglycemia with patient    PAD: previous PAD interventions, he was previously following up with Dr. Maharaj and Dr. Lyons in vascular surgery.  He is s/p right femoral endarterectomy, SFA stent and PTA of the popliteal artery on 9/14/2018 for nonhealing right lower extremity ulcer.  Subsequently had left SFA and popliteal artery balloon angioplasty 1/12/2021 with Dr. Maharaj also for left medial malleolus nonhealing ulceration.  No intervention since.    Last follow-up with Dr. Lyons was May 2022.      CAD: 2V CAD involving LAD and RCA, s/p DESx4 in 3/18, on DAPT indefinitely given PAD    Hip Fracture/Osteoporosis: After fall, s/p surgery, started alendronate 5/22    Routine Health Maintenance  Lipids:   Recent Labs   Lab Test 11/06/23  1440 09/08/22  1645   CHOL 115 124   HDL 56 49   LDL 39 49   TRIG 98 129        PSA (50-75 yrs):   Prostate Specific Antigen Screen   Date Value Ref Range Status   06/12/2024 2.23 0.00 - 6.50 ng/mL Final       DEXA: 7/21 FRAX elevated, multiple fractures, qualifies for treatment, started alendronate 5/22, but paused as due for dental implants  AAA Screening (65-75 yrs): CT 12/17, negative  Lung Ca Screening (>30 py 55-79 or >20 py 50-79 + RF):  "7/20, 7/21, 7/22, due 7/23  Colonoscopy (50-75 yrs): due 6/24, 6/19 Impression:          - The examined portion of the ileum was normal.                        - One 5 mm polyp in the cecum, removed with a cold snare                        and removed using injection-lift and a cold snare.                        Resected and retrieved.                        - One 6 mm polyp in the transverse colon, removed with a                        hot snare and removed using injection-lift and a hot                        snare. Resected and retrieved. Clip was placed.                        - One 5 mm polyp in the sigmoid colon, removed with a                        hot snare. Resected and retrieved.                        - The examination was otherwise normal on direct and                        retroflexion views.                        NOTE: the polyp reported as being in the descending                        colon in the prior colonoscopy report appears on images                        in the transverse colon; that is where we removed a                        likely SSA. The descending colon was inspected on                        multiple occasions and no polyps were identified.   Recommendation:      - Return to referring physician.                        - Repeat colonoscopy in 5 years for surveillance.                                                                           HIV/HCV if risk factors: nonreactive HepC 6/16  Safety/Lifestyle: reviewed  Tob/EtOH: declines quitting  Depression: PHQ-2 Score:         1/3/2024     3:25 PM 7/19/2023    12:21 PM   PHQ-2 ( 1999 [employer])   Q1: Little interest or pleasure in doing things 1 1   Q2: Feeling down, depressed or hopeless 1 0   PHQ-2 Score 2 1                               Objective    /64 (BP Location: Right arm, Patient Position: Sitting, Cuff Size: Adult Regular)   Pulse 100   Temp 98  F (36.7  C) (Oral)   Resp 18   Ht 1.892 m (6' 2.5\")   Wt 84 kg (185 lb " 3.2 oz)   SpO2 97%   BMI 23.46 kg/m    Body mass index is 23.46 kg/m .  Physical Exam   Constitutional: Alert, oriented, pleasant, no acute distress  Head: Normocephalic, atraumatic  Eyes: Extra-ocular movements intact, no scleral icterus  Neck: Supple  Cardiovascular: Regular rate and rhythm, no murmurs, rubs or gallops, peripheral pulses full/symmetric  Respiratory: Good air movement bilaterally, lungs clear, no wheezes/rales/rhonchi  Musculoskeletal: No edema, normal muscle tone, uses cane  Neurologic: Alert and oriented, cranial nerves 2-12 intact, no focal deficits  Skin: Foot wraps not taken down  Psychiatric: normal mentation, affect and mood              Signed Electronically by: Racheal Swift MD

## 2025-02-01 ENCOUNTER — HEALTH MAINTENANCE LETTER (OUTPATIENT)
Age: 80
End: 2025-02-01

## 2025-02-03 ENCOUNTER — OFFICE VISIT (OUTPATIENT)
Dept: PODIATRY | Facility: CLINIC | Age: 80
End: 2025-02-03
Payer: COMMERCIAL

## 2025-02-03 DIAGNOSIS — E11.49 TYPE II OR UNSPECIFIED TYPE DIABETES MELLITUS WITH NEUROLOGICAL MANIFESTATIONS, NOT STATED AS UNCONTROLLED(250.60) (H): ICD-10-CM

## 2025-02-03 DIAGNOSIS — E11.610 CHARCOT FOOT DUE TO DIABETES MELLITUS (H): ICD-10-CM

## 2025-02-03 DIAGNOSIS — E11.51 DIABETES MELLITUS WITH PERIPHERAL VASCULAR DISEASE (H): Primary | ICD-10-CM

## 2025-02-03 DIAGNOSIS — L97.322 SKIN ULCER OF LEFT ANKLE WITH FAT LAYER EXPOSED (H): ICD-10-CM

## 2025-02-03 DIAGNOSIS — I87.8 VENOUS STASIS: ICD-10-CM

## 2025-02-03 PROCEDURE — 99214 OFFICE O/P EST MOD 30 MIN: CPT | Performed by: PODIATRIST
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
## 2025-02-03 NOTE — NURSING NOTE
Amos Walker's chief complaint for this visit includes:  Chief Complaint   Patient presents with    RECHECK     Bilateral leg and foot recheck, wound cares     PCP: Racheal Swift    Referring Provider:  Referred Self, MD  No address on file    There were no vitals taken for this visit.  Data Unavailable     Do you need any medication refills at today's visit? NO    Allergies   Allergen Reactions    No Clinical Screening - See Comments      methylisothiazolinone    Seasonal Allergies     Simvastatin Other (See Comments)     Sun sensitivty    Methylisothiazolinone Rash    Neomycin      Wound gets worse    Povidone Iodine Rash       Chiquis Morris LPN
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
## 2025-02-17 ENCOUNTER — OFFICE VISIT (OUTPATIENT)
Dept: PODIATRY | Facility: CLINIC | Age: 80
End: 2025-02-17
Payer: COMMERCIAL

## 2025-02-17 DIAGNOSIS — L97.322 SKIN ULCER OF LEFT ANKLE WITH FAT LAYER EXPOSED (H): ICD-10-CM

## 2025-02-17 DIAGNOSIS — E11.49 TYPE II OR UNSPECIFIED TYPE DIABETES MELLITUS WITH NEUROLOGICAL MANIFESTATIONS, NOT STATED AS UNCONTROLLED(250.60) (H): ICD-10-CM

## 2025-02-17 DIAGNOSIS — E11.610 CHARCOT FOOT DUE TO DIABETES MELLITUS (H): ICD-10-CM

## 2025-02-17 DIAGNOSIS — I87.8 VENOUS STASIS: ICD-10-CM

## 2025-02-17 DIAGNOSIS — L97.922 SKIN ULCER OF LEFT LOWER LEG WITH FAT LAYER EXPOSED (H): ICD-10-CM

## 2025-02-17 DIAGNOSIS — E11.51 DIABETES MELLITUS WITH PERIPHERAL VASCULAR DISEASE (H): Primary | ICD-10-CM

## 2025-02-17 PROCEDURE — 99214 OFFICE O/P EST MOD 30 MIN: CPT | Performed by: PODIATRIST

## 2025-02-17 NOTE — NURSING NOTE
Amos Walker's chief complaint for this visit includes:  Chief Complaint   Patient presents with    RECHECK     Bilateral leg recheck and wound cares     PCP: Racheal Swift    Referring Provider:  Referred Self, MD  No address on file    There were no vitals taken for this visit.  Data Unavailable     Do you need any medication refills at today's visit? NO    Allergies   Allergen Reactions    No Clinical Screening - See Comments      methylisothiazolinone    Seasonal Allergies     Simvastatin Other (See Comments)     Sun sensitivty    Methylisothiazolinone Rash    Neomycin      Wound gets worse    Povidone Iodine Rash       Chiquis Morris LPN
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
## 2025-03-03 ENCOUNTER — OFFICE VISIT (OUTPATIENT)
Dept: PODIATRY | Facility: CLINIC | Age: 80
End: 2025-03-03
Payer: COMMERCIAL

## 2025-03-03 DIAGNOSIS — L97.322 SKIN ULCER OF LEFT ANKLE WITH FAT LAYER EXPOSED (H): ICD-10-CM

## 2025-03-03 DIAGNOSIS — E11.49 TYPE II OR UNSPECIFIED TYPE DIABETES MELLITUS WITH NEUROLOGICAL MANIFESTATIONS, NOT STATED AS UNCONTROLLED(250.60) (H): ICD-10-CM

## 2025-03-03 DIAGNOSIS — E11.610 CHARCOT FOOT DUE TO DIABETES MELLITUS (H): ICD-10-CM

## 2025-03-03 DIAGNOSIS — E11.51 DIABETES MELLITUS WITH PERIPHERAL VASCULAR DISEASE (H): Primary | ICD-10-CM

## 2025-03-03 PROCEDURE — 99214 OFFICE O/P EST MOD 30 MIN: CPT | Performed by: PODIATRIST
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
## 2025-03-05 DIAGNOSIS — H43.811 PVD (POSTERIOR VITREOUS DETACHMENT), RIGHT: Primary | ICD-10-CM

## 2025-03-13 ENCOUNTER — OFFICE VISIT (OUTPATIENT)
Dept: OPHTHALMOLOGY | Facility: CLINIC | Age: 80
End: 2025-03-13
Payer: COMMERCIAL

## 2025-03-13 DIAGNOSIS — E11.40 TYPE 2 DIABETES, CONTROLLED, WITH NEUROPATHY (H): ICD-10-CM

## 2025-03-13 DIAGNOSIS — H43.811 PVD (POSTERIOR VITREOUS DETACHMENT), RIGHT: ICD-10-CM

## 2025-03-13 PROCEDURE — 92134 CPTRZ OPH DX IMG PST SGM RTA: CPT

## 2025-03-13 PROCEDURE — G0463 HOSPITAL OUTPT CLINIC VISIT: HCPCS

## 2025-03-13 ASSESSMENT — TONOMETRY
OD_IOP_MMHG: 13
OS_IOP_MMHG: 12
IOP_METHOD: TONOPEN

## 2025-03-13 ASSESSMENT — SLIT LAMP EXAM - LIDS
COMMENTS: NORMAL
COMMENTS: NORMAL

## 2025-03-13 ASSESSMENT — EXTERNAL EXAM - RIGHT EYE: OD_EXAM: NORMAL

## 2025-03-13 ASSESSMENT — VISUAL ACUITY
OD_SC: 20/20
OD_SC+: -2
OS_SC+: -1
OS_SC: 20/25
METHOD: SNELLEN - LINEAR

## 2025-03-13 ASSESSMENT — EXTERNAL EXAM - LEFT EYE: OS_EXAM: NORMAL

## 2025-03-13 ASSESSMENT — CUP TO DISC RATIO
OS_RATIO: 0.35
OD_RATIO: 0.35

## 2025-03-13 NOTE — PROGRESS NOTES
CC: Diabetic retinopathy follow-up     HPI: Ms Walker is here for yearly eye exam, he notices slight deterioration in vision since last time he was here. His brother passed away yesterday    PMHx:   DM type II  PAD    Imaging:    OCT: 03/13/2025  Right eye: Good foveal contour, no IRF/SRF   Left eye: Good foveal contour, no IRF/SRF     Retina Laser procedures:  none    Intravitreal injections:  none    Assessment/ Plan: 03/13/2025       #ASSESSMENT/PLAN  1. T2DM w/ mild NPDR OU   -  blood glucose (HbA1c <7.0) control discussed with patient. Patient advised that failure to adequately control each may lead to vision loss. The patient expressed understanding.     Lab Results   Component Value Date    A1C 6.3 11/06/2023   Vision today is stable today  OCT shows no DME   Recommend yearly follow up, earlier if any symptoms arise.    # PVD both eyes   Retinal detachment/retinal tear precautions discussed with patient previously  Contact clinic immediately for new floaters/flashers or shadows in vision     # Refractive error OU  - Presbyopia OU, astigmatism OS  - Discussed and dispensed MRX toady     # Epiretinal membrane both eyes   - seen on Optical Coherence Tomography Scan   -stable, observe     Return to clinic: 12 months dilated fundus exam, V,T,D OCT macula OU       Razia Sams MD     Medical [facility]  [facility]     Attending Physician Attestation:  Complete documentation of historical and exam elements from today's encounter can be found in the full encounter summary report (not reduplicated in this progress note).  I personally obtained the chief complaint(s) and history of present illness.  I confirmed and edited as necessary the review of systems, past medical/surgical history, family history, social history, and examination findings as documented by others; and I examined the patient myself.  I personally reviewed the relevant tests, images, and reports as documented above.  I  formulated and edited as necessary the assessment and plan and discussed the findings and management plan with the patient and family. I personally reviewed the ophthalmic test(s) associated with this encounter, agree with the interpretation(s) as documented by the resident/fellow, and have edited the corresponding report(s) as necessary. - Razia Sams MD

## 2025-03-13 NOTE — NURSING NOTE
Chief Complaints and History of Present Illnesses   Patient presents with    Diabetic Retinopathy Follow Up     Chief Complaint(s) and History of Present Illness(es)       Diabetic Retinopathy Follow Up               Comments    Patient states vision feels grainy and irritated, he uses Blink drops as needed. Thinks vision has gotten worse specifically in right eye. Pt states he thinks his depth perception is off when walking. Vision gets blurry when looking at the computer. No flashes of light, floaters at night when lying on his back.    DM2 Last  this morning  Lab Results       Component                Value               Date                       A1C                      6.7                 01/27/2025                 A1C                      6.3                 10/14/2024                 A1C                      6.4                 06/12/2024                 A1C                      6.3                 11/06/2023                 A1C                      6.1                 04/04/2023                 A1C                      6.3                 09/08/2022                 A1C                      6.1                 01/10/2022                 A1C                      6.4                 08/16/2021                 A1C                      6.0                 01/12/2021                 A1C                      5.8                 09/02/2020                 A1C                      5.8                 12/20/2019                 A1C                      5.6                 10/04/2019                Ocular Meds:    Sri LIN 2:01 PM March 13, 2025

## 2025-03-16 ENCOUNTER — MYC MEDICAL ADVICE (OUTPATIENT)
Dept: INTERNAL MEDICINE | Facility: CLINIC | Age: 80
End: 2025-03-16
Payer: COMMERCIAL

## 2025-03-16 DIAGNOSIS — Z79.4 TYPE 2 DIABETES MELLITUS WITH DIABETIC PERIPHERAL ANGIOPATHY WITHOUT GANGRENE, WITH LONG-TERM CURRENT USE OF INSULIN (H): Primary | ICD-10-CM

## 2025-03-16 DIAGNOSIS — E11.51 TYPE 2 DIABETES MELLITUS WITH DIABETIC PERIPHERAL ANGIOPATHY WITHOUT GANGRENE, WITH LONG-TERM CURRENT USE OF INSULIN (H): Primary | ICD-10-CM

## 2025-03-17 ENCOUNTER — OFFICE VISIT (OUTPATIENT)
Dept: PODIATRY | Facility: CLINIC | Age: 80
End: 2025-03-17
Payer: COMMERCIAL

## 2025-03-17 DIAGNOSIS — I87.8 VENOUS STASIS: ICD-10-CM

## 2025-03-17 DIAGNOSIS — E11.610 CHARCOT FOOT DUE TO DIABETES MELLITUS (H): ICD-10-CM

## 2025-03-17 DIAGNOSIS — E11.49 TYPE II OR UNSPECIFIED TYPE DIABETES MELLITUS WITH NEUROLOGICAL MANIFESTATIONS, NOT STATED AS UNCONTROLLED(250.60) (H): ICD-10-CM

## 2025-03-17 DIAGNOSIS — L97.322 SKIN ULCER OF LEFT ANKLE WITH FAT LAYER EXPOSED (H): ICD-10-CM

## 2025-03-17 DIAGNOSIS — E11.51 DIABETES MELLITUS WITH PERIPHERAL VASCULAR DISEASE (H): Primary | ICD-10-CM

## 2025-03-17 PROCEDURE — 99214 OFFICE O/P EST MOD 30 MIN: CPT | Performed by: PODIATRIST
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
## 2025-03-18 ENCOUNTER — MYC MEDICAL ADVICE (OUTPATIENT)
Dept: INTERNAL MEDICINE | Facility: CLINIC | Age: 80
End: 2025-03-18
Payer: COMMERCIAL

## 2025-03-18 RX ORDER — HYDROCHLOROTHIAZIDE 12.5 MG/1
CAPSULE ORAL
Qty: 6 EACH | Refills: 3 | Status: SHIPPED | OUTPATIENT
Start: 2025-03-18

## 2025-03-18 RX ORDER — KETOROLAC TROMETHAMINE 30 MG/ML
1 INJECTION, SOLUTION INTRAMUSCULAR; INTRAVENOUS ONCE
Qty: 1 EACH | Refills: 0 | Status: SHIPPED | OUTPATIENT
Start: 2025-03-18 | End: 2025-03-18

## 2025-03-20 NOTE — TELEPHONE ENCOUNTER
Hi,   Please update my prescription dosage for Lisinopril at [facility] in [place]. I have a seven-day supply remaining.   FYI, my blood pressure was 116/68 this morning.    lisinopril (ZESTRIL) 2.5 MG tablet 270 tablet 3 1/27/2025 -- No   Sig - Route: Take 3 tablets (7.5 mg) by mouth daily. - Oral   [id number] - [place], [place] - [address] AT [facility] & [address]

## 2025-03-31 ENCOUNTER — OFFICE VISIT (OUTPATIENT)
Dept: PODIATRY | Facility: CLINIC | Age: 80
End: 2025-03-31
Payer: COMMERCIAL

## 2025-03-31 DIAGNOSIS — E11.610 CHARCOT FOOT DUE TO DIABETES MELLITUS (H): ICD-10-CM

## 2025-03-31 DIAGNOSIS — I87.8 VENOUS STASIS: ICD-10-CM

## 2025-03-31 DIAGNOSIS — L97.322 SKIN ULCER OF LEFT ANKLE WITH FAT LAYER EXPOSED (H): ICD-10-CM

## 2025-03-31 DIAGNOSIS — E11.49 TYPE II OR UNSPECIFIED TYPE DIABETES MELLITUS WITH NEUROLOGICAL MANIFESTATIONS, NOT STATED AS UNCONTROLLED(250.60) (H): ICD-10-CM

## 2025-03-31 DIAGNOSIS — E11.51 DIABETES MELLITUS WITH PERIPHERAL VASCULAR DISEASE (H): Primary | ICD-10-CM

## 2025-03-31 ASSESSMENT — PAIN SCALES - GENERAL: PAINLEVEL_OUTOF10: NO PAIN (0)

## 2025-03-31 NOTE — NURSING NOTE
Amos Walker's chief complaint for this visit includes:  Chief Complaint   Patient presents with    Left Lower Leg - Follow Up, WOUND CARE    Right Lower Leg - Follow Up, WOUND CARE     PCP: Racheal Swift    Referring Provider:  Referred Self, MD  No address on file    There were no vitals taken for this visit.  No Pain (0)     Do you need any medication refills at today's visit? NO    Allergies   Allergen Reactions    No Clinical Screening - See Comments      methylisothiazolinone    Seasonal Allergies     Simvastatin Other (See Comments)     Sun sensitivty    Methylisothiazolinone Rash    Neomycin      Wound gets worse    Povidone Iodine Rash       Conrado Ashby, EMT
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
## 2025-04-01 NOTE — PATIENT INSTRUCTIONS
Nemo is calling from [facility] states she is reporting patient had a fall today. Nemo states patient is refusing any care she will not let them check her vitals or send her out patient states she is ok and wants to be left alone. Nemo also states patient fell on 03/24 and refused treatment.    Thanks for coming today.  [facility]  [address] [place] [address]    To schedule future appointments in Ortho Clinic, you may call [phone].    To schedule ordered imaging by your provider:   Call Central Imaging [phone]    To schedule an injection ordered by your provider:  Call Central Imaging Injection scheduling line: [phone]  [facility] available online at:  [email or web address]    Please call if any further questions or concerns ([phone]).  Clinic hours 8 am to 5 pm.    Return to clinic (call) if symptoms worsen or fail to improve.

## 2025-04-09 ENCOUNTER — DOCUMENTATION ONLY (OUTPATIENT)
Dept: AUDIOLOGY | Facility: CLINIC | Age: 80
End: 2025-04-09
Payer: COMMERCIAL

## 2025-04-10 NOTE — PROGRESS NOTES
"Walk-in hearing aid services on 4/9/25: The cShell wire on the patient's right hearing aid was found to be broken. The hearing aid and cShell are being sent to the  for warranty repair and the patient will be contacted via email to pick it up. The patient's left hearing aid was cleaned and the cShell filter was replaced. The patient had sated he was unable to get a vent  thread through the vent and I was unable to get one through as well. The patient was advised we would need to send the cShell to the  for \"repair\" to have the vent cleaned. He declined repair at this time. He was provided with a pack of Cerustop filters and vent cleaning threads today.  "

## 2025-04-30 ENCOUNTER — OFFICE VISIT (OUTPATIENT)
Dept: PODIATRY | Facility: CLINIC | Age: 80
End: 2025-04-30
Payer: COMMERCIAL

## 2025-04-30 DIAGNOSIS — E11.51 DIABETES MELLITUS WITH PERIPHERAL VASCULAR DISEASE (H): Primary | ICD-10-CM

## 2025-04-30 DIAGNOSIS — E11.49 TYPE II OR UNSPECIFIED TYPE DIABETES MELLITUS WITH NEUROLOGICAL MANIFESTATIONS, NOT STATED AS UNCONTROLLED(250.60) (H): ICD-10-CM

## 2025-04-30 DIAGNOSIS — L97.322 SKIN ULCER OF LEFT ANKLE WITH FAT LAYER EXPOSED (H): ICD-10-CM

## 2025-04-30 DIAGNOSIS — I87.8 VENOUS STASIS: ICD-10-CM

## 2025-04-30 DIAGNOSIS — E11.610 CHARCOT FOOT DUE TO DIABETES MELLITUS (H): ICD-10-CM

## 2025-04-30 RX ORDER — LEVOFLOXACIN 750 MG/1
750 TABLET, FILM COATED ORAL DAILY
Qty: 14 TABLET | Refills: 0 | Status: SHIPPED | OUTPATIENT
Start: 2025-04-30

## 2025-04-30 NOTE — NURSING NOTE
Amos Walker's chief complaint for this visit includes:  Chief Complaint   Patient presents with    RECHECK     Leg recheck and wound cares     PCP: Racheal Swift    Referring Provider:  Referred Self, MD  No address on file    There were no vitals taken for this visit.  Data Unavailable     Do you need any medication refills at today's visit? NO    Allergies   Allergen Reactions    No Clinical Screening - See Comments      methylisothiazolinone    Seasonal Allergies     Simvastatin Other (See Comments)     Sun sensitivty    Methylisothiazolinone Rash    Neomycin      Wound gets worse    Povidone Iodine Rash       Chiquis Morris LPN
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
## 2025-05-16 ENCOUNTER — OFFICE VISIT (OUTPATIENT)
Dept: PODIATRY | Facility: CLINIC | Age: 80
End: 2025-05-16
Payer: COMMERCIAL

## 2025-05-16 DIAGNOSIS — B35.1 ONYCHOMYCOSIS: ICD-10-CM

## 2025-05-16 DIAGNOSIS — I87.8 VENOUS STASIS: ICD-10-CM

## 2025-05-16 DIAGNOSIS — E11.49 TYPE II OR UNSPECIFIED TYPE DIABETES MELLITUS WITH NEUROLOGICAL MANIFESTATIONS, NOT STATED AS UNCONTROLLED(250.60) (H): ICD-10-CM

## 2025-05-16 DIAGNOSIS — E11.610 CHARCOT FOOT DUE TO DIABETES MELLITUS (H): ICD-10-CM

## 2025-05-16 DIAGNOSIS — E11.51 DIABETES MELLITUS WITH PERIPHERAL VASCULAR DISEASE (H): ICD-10-CM

## 2025-05-16 DIAGNOSIS — L84 TYLOMA: Primary | ICD-10-CM

## 2025-05-16 DIAGNOSIS — L97.322 SKIN ULCER OF LEFT ANKLE WITH FAT LAYER EXPOSED (H): ICD-10-CM

## 2025-05-16 PROCEDURE — 99214 OFFICE O/P EST MOD 30 MIN: CPT | Mod: 25 | Performed by: PODIATRIST

## 2025-05-16 PROCEDURE — 11721 DEBRIDE NAIL 6 OR MORE: CPT | Mod: XS | Performed by: PODIATRIST

## 2025-05-16 PROCEDURE — 11055 PARING/CUTG B9 HYPRKER LES 1: CPT | Performed by: PODIATRIST

## 2025-05-16 RX ORDER — LEVOFLOXACIN 750 MG/1
750 TABLET, FILM COATED ORAL DAILY
Qty: 14 TABLET | Refills: 0 | Status: SHIPPED | OUTPATIENT
Start: 2025-05-16
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
## 2025-05-28 ENCOUNTER — OFFICE VISIT (OUTPATIENT)
Dept: PODIATRY | Facility: CLINIC | Age: 80
End: 2025-05-28
Payer: COMMERCIAL

## 2025-05-28 DIAGNOSIS — E11.49 TYPE II OR UNSPECIFIED TYPE DIABETES MELLITUS WITH NEUROLOGICAL MANIFESTATIONS, NOT STATED AS UNCONTROLLED(250.60) (H): ICD-10-CM

## 2025-05-28 DIAGNOSIS — E11.610 CHARCOT FOOT DUE TO DIABETES MELLITUS (H): ICD-10-CM

## 2025-05-28 DIAGNOSIS — L97.322 SKIN ULCER OF LEFT ANKLE WITH FAT LAYER EXPOSED (H): ICD-10-CM

## 2025-05-28 DIAGNOSIS — I87.8 VENOUS STASIS: ICD-10-CM

## 2025-05-28 DIAGNOSIS — E11.51 DIABETES MELLITUS WITH PERIPHERAL VASCULAR DISEASE (H): ICD-10-CM

## 2025-05-28 RX ORDER — LEVOFLOXACIN 750 MG/1
750 TABLET, FILM COATED ORAL DAILY
Qty: 14 TABLET | Refills: 0 | Status: SHIPPED | OUTPATIENT
Start: 2025-05-28

## 2025-05-28 NOTE — NURSING NOTE
Amos Walker's chief complaint for this visit includes:  Chief Complaint   Patient presents with    Left Lower Leg - Follow Up, WOUND CARE    Right Lower Leg - Follow Up, WOUND CARE     PCP: Racheal Swift    Referring Provider:  Referred Self, MD  No address on file    There were no vitals taken for this visit.  Data Unavailable        Allergies   Allergen Reactions    No Clinical Screening - See Comments      methylisothiazolinone    Seasonal Allergies     Simvastatin Other (See Comments)     Sun sensitivty    Methylisothiazolinone Rash    Neomycin      Wound gets worse    Povidone Iodine Rash         Do you need any medication refills at today's visit?
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
## 2025-05-30 ENCOUNTER — RESULTS FOLLOW-UP (OUTPATIENT)
Dept: INTERNAL MEDICINE | Facility: CLINIC | Age: 80
End: 2025-05-30

## 2025-06-02 ENCOUNTER — PATIENT OUTREACH (OUTPATIENT)
Dept: CARE COORDINATION | Facility: CLINIC | Age: 80
End: 2025-06-02
Payer: COMMERCIAL

## 2025-06-04 ENCOUNTER — PATIENT OUTREACH (OUTPATIENT)
Dept: CARE COORDINATION | Facility: CLINIC | Age: 80
End: 2025-06-04
Payer: COMMERCIAL

## 2025-06-09 NOTE — TELEPHONE ENCOUNTER
REASON FOR VISIT: Tremor    PROVIDER: Edna Cooper NP   DATE OF APPT: 8/8/25   NOTES (FOR ALL VISITS) STATUS DETAILS   OFFICE NOTE from referring provider Internal Dr Racheal Swift @ [facility] Internal [facility]:  5/30/25   MEDICATION LIST Internal    IMAGING  (FOR ALL VISITS)     MRI (HEAD, NECK, SPINE) N/A    CT (HEAD, NECK, SPINE) N/A

## 2025-06-11 ENCOUNTER — OFFICE VISIT (OUTPATIENT)
Dept: PODIATRY | Facility: CLINIC | Age: 80
End: 2025-06-11
Payer: COMMERCIAL

## 2025-06-11 DIAGNOSIS — L97.322 SKIN ULCER OF LEFT ANKLE WITH FAT LAYER EXPOSED (H): ICD-10-CM

## 2025-06-11 DIAGNOSIS — E11.49 TYPE II OR UNSPECIFIED TYPE DIABETES MELLITUS WITH NEUROLOGICAL MANIFESTATIONS, NOT STATED AS UNCONTROLLED(250.60) (H): ICD-10-CM

## 2025-06-11 DIAGNOSIS — I87.8 VENOUS STASIS: ICD-10-CM

## 2025-06-11 DIAGNOSIS — E11.51 DIABETES MELLITUS WITH PERIPHERAL VASCULAR DISEASE (H): Primary | ICD-10-CM

## 2025-06-11 NOTE — NURSING NOTE
Amos Walker's chief complaint for this visit includes:  Chief Complaint   Patient presents with    RECHECK     Bilateral leg recheck and wound cares     PCP: Racheal Swift    Referring Provider:  Referred Self, MD  No address on file    There were no vitals taken for this visit.  Data Unavailable     Do you need any medication refills at today's visit? NO    Allergies   Allergen Reactions    No Clinical Screening - See Comments      methylisothiazolinone    Seasonal Allergies     Simvastatin Other (See Comments)     Sun sensitivty    Lisinopril Dizziness     Sun sensitive    Methylisothiazolinone Rash    Neomycin      Wound gets worse    Povidone Iodine Rash       Chiquis Morris LPN
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
## 2025-06-17 DIAGNOSIS — I25.83 CORONARY ARTERY DISEASE DUE TO LIPID RICH PLAQUE: ICD-10-CM

## 2025-06-17 DIAGNOSIS — I73.9 PERIPHERAL VASCULAR DISEASE, UNSPECIFIED: ICD-10-CM

## 2025-06-17 DIAGNOSIS — I73.9 PVD (PERIPHERAL VASCULAR DISEASE): ICD-10-CM

## 2025-06-17 DIAGNOSIS — I25.10 CORONARY ARTERY DISEASE DUE TO LIPID RICH PLAQUE: ICD-10-CM

## 2025-06-17 DIAGNOSIS — E11.40 TYPE 2 DIABETES, CONTROLLED, WITH NEUROPATHY (H): ICD-10-CM

## 2025-06-17 RX ORDER — INSULIN LISPRO 100 [IU]/ML
3-4 INJECTION, SOLUTION INTRAVENOUS; SUBCUTANEOUS
Qty: 30 ML | Refills: 0 | Status: SHIPPED | OUTPATIENT
Start: 2025-06-17

## 2025-06-17 NOTE — TELEPHONE ENCOUNTER
Patient is transferring to our pharmacy from a [facility], and I was wondering if it would be possible to request new prescriptions for these medications be sent to our pharmacy? The prescriptions that the [facility] transferred to us were .    Thank you,  Huma Styles, Pharmacy Technician  [facility]

## 2025-06-17 NOTE — TELEPHONE ENCOUNTER
Last Written Prescription:   Disp Refills Start End RODO   insulin lispro (HUMALOG KWIKPEN) 100 UNIT/ML (1 unit dial) KWIKPEN 30 mL 3 6/14/2024 -- No   Sig - Route: Inject 3-4 Units Subcutaneous 3 times daily (before meals) Reports he takes 8 units with breakfast, and 4 units with lunch and dinner. - Subcutaneous      Disp Refills Start End RODO   dulaglutide (TRULICITY) 1.5 MG/0.5ML pen 6 mL 3 6/14/2024 -- No   Sig - Route: Inject 1.5 mg Subcutaneous every 7 days - Subcutaneous      Disp Refills Start End RODO   simvastatin (ZOCOR) 40 MG tablet 90 tablet 3 6/14/2024 -- No   Sig - Route: Take 1 tablet (40 mg) by mouth at bedtime - Oral      Disp Refills Start End RODO   clopidogrel (PLAVIX) 75 MG tablet 90 tablet 3 7/10/2024 -- No   Sig - Route: TAKE 1 TABLET(75 MG) BY MOUTH DAILY - Oral     ----------------------  Last Visit Date:   5/30/2025  [facility] [place]    Future Visit Date:    9/22/2025 12:00 PM (30 min)  [place]   Arrive by: 11:45 AM   [facility] RETURN   [facility] ([facility])   Racheal Swift MD     ----------------------      Refill decision: Medication unable to be refilled by RN due to:  Abnormal labs/test: and Overdue labs/test:  GFR, Hgb    GFR Estimate   Date Value Ref Range Status   11/25/2024 58 (L) >60 mL/min/1.73m2 Final     Comment:     eGFR calculated using 2021 CKD-EPI equation.   01/13/2021 57 (L) >60 mL/min/[1.73_m2] Final     Comment:     Non  GFR Calc  Starting 12/18/2018, serum creatinine based estimated GFR (eGFR) will be   calculated using the Chronic Kidney Disease Epidemiology Collaboration   (CKD-EPI) equation.       LDL Cholesterol Calculated   Date Value Ref Range Status   11/06/2023 39 <=100 mg/dL Final   09/02/2020 22 <100 mg/dL Final     Comment:     Desirable:       <100 mg/dl     Creatinine   Date Value Ref Range Status   11/25/2024 1.28 (H) 0.67 - 1.17 mg/dL Final   01/13/2021 1.24 0.66 - 1.25 mg/dL Final        Request from  pharmacy:  Requested Prescriptions   Pending Prescriptions Disp Refills    insulin lispro (HUMALOG KWIKPEN) 100 UNIT/ML (1 unit dial) KWIKPEN 30 mL 3     Sig: Inject 3-4 Units subcutaneously 3 times daily (before meals). Reports he takes 8 units with breakfast, and 4 units with lunch and dinner.       Insulin Protocol Failed - 6/17/2025  3:35 PM        Failed - Chart review required     Review Chart.    Do not approve if insulin is used in a pump.  Instead, direct refill request to the patient's endocrinologist.  If the patient doesn't have an endocrinologist, then send the refill to the patient's PCP for review            Passed - HgbA1C in past 3 or 6 months     If HgbA1C is 8 or greater, it needs to be on file within the past 3 months.  If less than 8, must be on file within the past 6 months.     Recent Labs   Lab Test 05/30/25  1214   A1C 6.7*             Passed - Medication is active on med list and the sig matches. RN to manually verify dose and sig if red X/fail.     If the protocol passes (green check), you do not need to verify med dose and sig.    A prescription matches if they are the same clinical intention.    For Example: once daily and every morning are the same.    The protocol can not identify upper and lower case letters as matching and will fail.     For Example: Take 1 tablet (50 mg) by mouth daily     TAKE 1 TABLET (50 MG) BY MOUTH DAILY    For all fails (red x), verify dose and sig.    If the refill does match what is on file, the RN can still proceed to approve the refill request.       If they do not match, route to the appropriate provider.             Passed - Recent (6 month) or future (90 days) visit with the authorizing provider's specialty (provided they have been seen in the past 9 months)     The patient must have completed an in-person or virtual visit within the past 6 months or has a future visit scheduled within the next 90 days with the authorizing provider s specialty.  Urgent  care and e-visits do not quality as an office visit for this protocol.          Passed - Medication indicated for associated diagnosis     Medication is associated with one or more of the following diagnoses:   - Type 1 diabetes mellitus  - Type 2 diabetes mellitus  - Diabetic nephropathy; Prophylaxis  - Neuropathy due to diabetes mellitus; Prophylaxis  - Retinopathy due to diabetes mellitus; Prophylaxis  - Diabetes mellitus associated with cystic fibrosis  - Disorder of cardiovascular system; Prophylaxis - Type 1 diabetes mellitus   - Disorder of cardiovascular system; Prophylaxis - Type 2 diabetes mellitus            Passed - Patient is 18 years of age or older          dulaglutide (TRULICITY) 1.5 MG/0.5ML pen       Sig: Inject subcutaneously.       GLP-1 Agonists Protocol Failed - 6/17/2025  3:35 PM        Failed - Medication is active on med list and the sig matches. RN to manually verify dose and sig if red X/fail.     If the protocol passes (green check), you do not need to verify med dose and sig.    A prescription matches if they are the same clinical intention.    For Example: once daily and every morning are the same.    The protocol can not identify upper and lower case letters as matching and will fail.     For Example: Take 1 tablet (50 mg) by mouth daily     TAKE 1 TABLET (50 MG) BY MOUTH DAILY    For all fails (red x), verify dose and sig.    If the refill does match what is on file, the RN can still proceed to approve the refill request.       If they do not match, route to the appropriate provider.             Failed - Has GFR on file in past 12 months and most recent value is normal        Passed - HgbA1C in past 3 or 6 months     If HgbA1C is 8 or greater, it needs to be on file within the past 3 months.  If less than 8, must be on file within the past 6 months.     Recent Labs   Lab Test 05/30/25  1214   A1C 6.7*             Passed - Recent (6 month) or future (90 days) visit with the authorizing  provider's specialty (provided they have been seen in the past 9 months)     The patient must have completed an in-person or virtual visit within the past 6 months or has a future visit scheduled within the next 90 days with the authorizing provider s specialty.  Urgent care and e-visits do not quality as an office visit for this protocol.          Passed - Medication indicated for associated diagnosis     Medication is associated with one or more of the following diagnoses:     Type 2 diabetes mellitus           Passed - Patient is age 18 or older          simvastatin (ZOCOR) 40 MG tablet 90 tablet 3     Sig: Take 1 tablet (40 mg) by mouth at bedtime.       Antihyperlipidemic agents Failed - 6/17/2025  3:35 PM        Failed - LDL on file in the past 12 months        Passed - Medication is active on med list and the sig matches. RN to manually verify dose and sig if red X/fail.     If the protocol passes (green check), you do not need to verify med dose and sig.    A prescription matches if they are the same clinical intention.    For Example: once daily and every morning are the same.    The protocol can not identify upper and lower case letters as matching and will fail.     For Example: Take 1 tablet (50 mg) by mouth daily     TAKE 1 TABLET (50 MG) BY MOUTH DAILY    For all fails (red x), verify dose and sig.    If the refill does match what is on file, the RN can still proceed to approve the refill request.       If they do not match, route to the appropriate provider.             Passed - Recent (12 month) or future (90 days) visit with authorizing provider's specialty (provided they have been seen in the past 15 months)     The patient must have completed an in-person or virtual visit within the past 12 months or has a future visit scheduled within the next 90 days with the authorizing provider s specialty.  Urgent care and e-visits do not qualify as an office visit for this protocol.          Passed - Patient  is age 18 years or older          clopidogrel (PLAVIX) 75 MG tablet 90 tablet 3     Sig: Take 1 tablet (75 mg) by mouth daily.       Plavix Failed - 6/17/2025  3:35 PM        Failed - Normal HGB on file in past 12 months     Recent Labs   Lab Test 11/25/24  1446   HGB 12.7*               Failed - Medication indicated for associated diagnosis     Medication is associated with one or more of the following diagnoses:  Cerebrovascular accident  Myocardial infarction  Percutaneous coronary intervention  Peripheral arterial occlusive disease  Transient cerebral ischemia          Passed - Normal Platelets on file in past 12 months     Recent Labs   Lab Test 11/25/24  1446                  Passed - Medication is active on med list and the sig matches. RN to manually verify dose and sig if red X/fail.     If the protocol passes (green check), you do not need to verify med dose and sig.    A prescription matches if they are the same clinical intention.    For Example: once daily and every morning are the same.    The protocol can not identify upper and lower case letters as matching and will fail.     For Example: Take 1 tablet (50 mg) by mouth daily     TAKE 1 TABLET (50 MG) BY MOUTH DAILY    For all fails (red x), verify dose and sig.    If the refill does match what is on file, the RN can still proceed to approve the refill request.       If they do not match, route to the appropriate provider.             Passed - Recent (12 month) or future (90 days) visit with authorizing provider's specialty (provided they have been seen in the past 15 months)     The patient must have completed an in-person or virtual visit within the past 12 months or has a future visit scheduled within the next 90 days with the authorizing provider s specialty.  Urgent care and e-visits do not qualify as an office visit for this protocol.          Passed - Patient is age 18 or older

## 2025-06-18 RX ORDER — DULAGLUTIDE 1.5 MG/.5ML
1.5 INJECTION, SOLUTION SUBCUTANEOUS
Qty: 6 ML | Refills: 3 | Status: SHIPPED | OUTPATIENT
Start: 2025-06-18

## 2025-06-18 RX ORDER — CLOPIDOGREL BISULFATE 75 MG/1
75 TABLET ORAL DAILY
Qty: 90 TABLET | Refills: 4 | Status: SHIPPED | OUTPATIENT
Start: 2025-06-18

## 2025-06-18 RX ORDER — SIMVASTATIN 40 MG
40 TABLET ORAL AT BEDTIME
Qty: 90 TABLET | Refills: 4 | Status: SHIPPED | OUTPATIENT
Start: 2025-06-18

## 2025-06-30 ENCOUNTER — OFFICE VISIT (OUTPATIENT)
Dept: PODIATRY | Facility: CLINIC | Age: 80
End: 2025-06-30
Payer: COMMERCIAL

## 2025-06-30 DIAGNOSIS — E11.51 DIABETES MELLITUS WITH PERIPHERAL VASCULAR DISEASE (H): ICD-10-CM

## 2025-06-30 DIAGNOSIS — L97.322 SKIN ULCER OF LEFT ANKLE WITH FAT LAYER EXPOSED (H): ICD-10-CM

## 2025-06-30 DIAGNOSIS — E11.49 TYPE II OR UNSPECIFIED TYPE DIABETES MELLITUS WITH NEUROLOGICAL MANIFESTATIONS, NOT STATED AS UNCONTROLLED(250.60) (H): ICD-10-CM

## 2025-06-30 DIAGNOSIS — I87.8 VENOUS STASIS: Primary | ICD-10-CM
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
## 2025-07-16 ENCOUNTER — OFFICE VISIT (OUTPATIENT)
Dept: PODIATRY | Facility: CLINIC | Age: 80
End: 2025-07-16
Payer: COMMERCIAL

## 2025-07-16 DIAGNOSIS — E11.49 TYPE II OR UNSPECIFIED TYPE DIABETES MELLITUS WITH NEUROLOGICAL MANIFESTATIONS, NOT STATED AS UNCONTROLLED(250.60) (H): ICD-10-CM

## 2025-07-16 DIAGNOSIS — L97.322 SKIN ULCER OF LEFT ANKLE WITH FAT LAYER EXPOSED (H): ICD-10-CM

## 2025-07-16 DIAGNOSIS — E11.610 CHARCOT FOOT DUE TO DIABETES MELLITUS (H): ICD-10-CM

## 2025-07-16 DIAGNOSIS — I87.8 VENOUS STASIS: Primary | ICD-10-CM

## 2025-07-16 DIAGNOSIS — E11.51 DIABETES MELLITUS WITH PERIPHERAL VASCULAR DISEASE (H): ICD-10-CM
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
## 2025-07-16 NOTE — NURSING NOTE
Amos Walker's chief complaint for this visit includes:  Chief Complaint   Patient presents with    RECHECK     Bilateral leg recheck     PCP: Racheal Swift    Referring Provider:  Referred Self, MD  No address on file    There were no vitals taken for this visit.  Data Unavailable     Do you need any medication refills at today's visit? NO    Allergies   Allergen Reactions    No Clinical Screening - See Comments      methylisothiazolinone    Seasonal Allergies     Simvastatin Other (See Comments)     Sun sensitivty    Lisinopril Dizziness     Sun sensitive    Methylisothiazolinone Rash    Neomycin      Wound gets worse    Povidone Iodine Rash       Chiquis Morris LPN

## 2025-07-27 ENCOUNTER — PATIENT OUTREACH (OUTPATIENT)
Dept: ONCOLOGY | Facility: CLINIC | Age: 80
End: 2025-07-27
Payer: COMMERCIAL

## 2025-07-27 NOTE — PROGRESS NOTES
[facility]: Cancer Care                                                                                          CHART REVIEW:    Pt follows with Dr. Sheng Ramirez regarding chronic anemia of inflammation and MGUS.  Pt last visit with Dr. Sheng Ramirez was 11/27/24 with plan for return in 1 year.  Pt is currently scheduled for a return visit with Dr. Sheng Ramirez on 11/26/25.    Signature:  Katy Damian RN, OCN

## 2025-07-30 ENCOUNTER — OFFICE VISIT (OUTPATIENT)
Dept: PODIATRY | Facility: CLINIC | Age: 80
End: 2025-07-30
Payer: COMMERCIAL

## 2025-07-30 DIAGNOSIS — E11.49 TYPE II OR UNSPECIFIED TYPE DIABETES MELLITUS WITH NEUROLOGICAL MANIFESTATIONS, NOT STATED AS UNCONTROLLED(250.60) (H): ICD-10-CM

## 2025-07-30 DIAGNOSIS — S80.822S: ICD-10-CM

## 2025-07-30 DIAGNOSIS — I87.8 VENOUS STASIS: Primary | ICD-10-CM

## 2025-07-30 DIAGNOSIS — E11.51 DIABETES MELLITUS WITH PERIPHERAL VASCULAR DISEASE (H): ICD-10-CM

## 2025-07-30 DIAGNOSIS — E11.610 CHARCOT FOOT DUE TO DIABETES MELLITUS (H): ICD-10-CM

## 2025-07-30 DIAGNOSIS — L97.322 SKIN ULCER OF LEFT ANKLE WITH FAT LAYER EXPOSED (H): ICD-10-CM

## 2025-07-30 PROCEDURE — 99214 OFFICE O/P EST MOD 30 MIN: CPT | Performed by: PODIATRIST

## 2025-07-30 RX ORDER — LEVOFLOXACIN 750 MG/1
750 TABLET, FILM COATED ORAL DAILY
Qty: 14 TABLET | Refills: 0 | Status: SHIPPED | OUTPATIENT
Start: 2025-07-30
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
## 2025-08-08 ENCOUNTER — PRE VISIT (OUTPATIENT)
Dept: NEUROLOGY | Facility: CLINIC | Age: 80
End: 2025-08-08

## 2025-08-08 ENCOUNTER — OFFICE VISIT (OUTPATIENT)
Dept: NEUROLOGY | Facility: CLINIC | Age: 80
End: 2025-08-08
Attending: INTERNAL MEDICINE
Payer: COMMERCIAL

## 2025-08-08 VITALS — OXYGEN SATURATION: 98 % | SYSTOLIC BLOOD PRESSURE: 132 MMHG | HEART RATE: 101 BPM | DIASTOLIC BLOOD PRESSURE: 65 MMHG

## 2025-08-08 DIAGNOSIS — Z82.0 FAMILY HISTORY OF PARKINSON'S DISEASE: Primary | ICD-10-CM

## 2025-08-08 DIAGNOSIS — G25.0 ESSENTIAL TREMOR: ICD-10-CM

## 2025-08-08 DIAGNOSIS — R26.89 ABNORMALITY OF GAIT DUE TO IMPAIRMENT OF BALANCE: ICD-10-CM

## 2025-08-08 PROCEDURE — 3078F DIAST BP <80 MM HG: CPT | Performed by: NURSE PRACTITIONER

## 2025-08-08 PROCEDURE — G2211 COMPLEX E/M VISIT ADD ON: HCPCS | Performed by: NURSE PRACTITIONER

## 2025-08-08 PROCEDURE — 3075F SYST BP GE 130 - 139MM HG: CPT | Performed by: NURSE PRACTITIONER

## 2025-08-08 PROCEDURE — 82570 ASSAY OF URINE CREATININE: CPT | Performed by: INTERNAL MEDICINE

## 2025-08-08 PROCEDURE — 99417 PROLNG OP E/M EACH 15 MIN: CPT | Performed by: NURSE PRACTITIONER

## 2025-08-08 PROCEDURE — 99000 SPECIMEN HANDLING OFFICE-LAB: CPT | Performed by: PATHOLOGY

## 2025-08-08 PROCEDURE — 99205 OFFICE O/P NEW HI 60 MIN: CPT | Performed by: NURSE PRACTITIONER

## 2025-08-08 ASSESSMENT — UNIFIED PARKINSONS DISEASE RATING SCALE (UPDRS)
POSTURAL_STABILITY: (3) MODERATE: STANDS SAFELY, BUT WITH ABSENCE OF POSTURAL RESPONSE, FALLS IF NOT CAUGHT BY EXAMINER.
TOTAL_SCORE: 35
RIGIDITY_RUE: (0) NORMAL: NO RIGIDITY.
FACIAL_EXPRESSION: (0) NORMAL: NORMAL FACIAL EXPRESSION.
RIGIDITY_LLE: (0) NORMAL: NO RIGIDITY.
GAIT: (3) MODERATE: REQUIRES AN ASSISTANCE DEVICE FOR SAFE WALKING (WALKING STICK, WALKER) BUT NOT A PERSON.
MOVEMENTS_INTERFERE_WITH_RATINGS: NO
RIGIDITY_LUE: (0) NORMAL: NO RIGIDITY.
AXIAL_SCORE: 14
HANDMOVEMENTS_RIGHT: (1) SLIGHT: ANY OF THE FOLLOWING: A) THE REGULAR RHYTHM IS BROKEN WITH ONE WITH ONE OR TWO INTERRUPTIONS OR HESITATIONS OF THE MOVEMENT  B) SLIGHT SLOWING  C) THE AMPLITUDE DECREMENTS NEAR THE END OF THE 10 MOVEMENTS.
FINGER_TAPPING_LEFT: (1) SLIGHT: ANY OF THE FOLLOWING: A) THE REGULAR RHYTHM IS BROKEN WITH ONE WITH ONE OR TWO INTERRUPTIONS OR HESITATIONS OF THE MOVEMENT  B) SLIGHT SLOWING  C) THE AMPLITUDE DECREMENTS NEAR THE END OF THE 10 MOVEMENTS.
PRONATION_SUPINATION_LEFT: (1) SLIGHT: ANY OF THE FOLLOWING: A) THE REGULAR RHYTHM IS BROKEN WITH ONE WITH ONE OR TWO INTERRUPTIONS OR HESITATIONS OF THE MOVEMENT  B) SLIGHT SLOWING  C) THE AMPLITUDE DECREMENTS NEAR THE END OF THE 10 MOVEMENTS.
SPEECH: (0) NORMAL: NO SPEECH PROBLEMS.
ARISING_CHAIR: (2) MILD: PUSHES SELF UP FROM ARMS OF CHAIR WITHOUT DIFFICULTY.
LEG_AGILITY_LEFT: (2) MILD: ANY OF THE FOLLOWING: A) 3 TO 5 INTERRUPTIONS DURING TAPPING  B) MILD SLOWING  C) THE AMPLITUDE DECREMENTS MIDWAY IN THE 10-MOVEMENT SEQUENCE.
PRONATION_SUPINATION_RIGHT: (1) SLIGHT: ANY OF THE FOLLOWING: A) THE REGULAR RHYTHM IS BROKEN WITH ONE WITH ONE OR TWO INTERRUPTIONS OR HESITATIONS OF THE MOVEMENT  B) SLIGHT SLOWING  C) THE AMPLITUDE DECREMENTS NEAR THE END OF THE 10 MOVEMENTS.
RIGIDITY_RLE: (1) SLIGHT: RIGIDITY ONLY DETECTED WITH ACTIVATION MANEUVER.
AMPLITUDE_LUE: (0) NORMAL: NO TREMOR.
DYSKINESIAS_PRESENT: NO
TOTAL_SCORE: 10
AMPLITUDE_LIP_JAW: (0) NORMAL: NO TREMOR.
PARKINSONS_MEDS: OFF
AMPLITUDE_RUE: (0) NORMAL: NO TREMOR.
TOETAPPING_RIGHT: (3) MODERATE: ANY OF THE FOLLOWING: A) MORE THAN 5 INTERRUPTIONS OR AT LEAST ONE LONGER ARREST (FREEZE) IN ONGOING MOVEMENT  B) MODERATE SLOWING  C) THE AMPLITUDE DECREMENTS STARTING AFTER THE FIRST MOVEMENT.
CONSTANCY_TREMOR_ATREST: (0) NORMAL: NO TREMOR.
AMPLITUDE_RLE: (0) NORMAL: NO TREMOR.
LEG_AGILITY_RIGHT: (1) SLIGHT: ANY OF THE FOLLOWING: A) THE REGULAR RHYTHM IS BROKEN WITH ONE WITH ONE OR TWO INTERRUPTIONS OR HESITATIONS OF THE MOVEMENT  B) SLIGHT SLOWING  C) THE AMPLITUDE DECREMENTS NEAR THE END OF THE 10 MOVEMENTS.
SPONTANEITY_OF_MOVEMENT: (2) MILD: MILD GLOBAL SLOWNESS AND POVERTY OF SPONTANEOUS MOVEMENTS.
FINGER_TAPPING_RIGHT: (1) SLIGHT: ANY OF THE FOLLOWING: A) THE REGULAR RHYTHM IS BROKEN WITH ONE WITH ONE OR TWO INTERRUPTIONS OR HESITATIONS OF THE MOVEMENT  B) SLIGHT SLOWING  C) THE AMPLITUDE DECREMENTS NEAR THE END OF THE 10 MOVEMENTS.
TOETAPPING_LEFT: (2) MILD: ANY OF THE FOLLOWING: A) 3 TO 5 INTERRUPTIONS DURING TAPPING  B) MILD SLOWING  C) THE AMPLITUDE DECREMENTS MIDWAY IN THE 10-MOVEMENT SEQUENCE.
TOTAL_SCORE_LEFT: 11
FREEZING_GAIT: (0) NORMAL: NO FREEZING.
POSTURE: (2) MILD: DEFINITE FLEXION, SCOLIOSIS OR LEANING TO ONE SIDE, BUT CAN CORRECT POSTURE TO NORMAL WHEN ASKED.
HANDMOVEMENTS_LEFT: (1) SLIGHT: ANY OF THE FOLLOWING: A) THE REGULAR RHYTHM IS BROKEN WITH ONE WITH ONE OR TWO INTERRUPTIONS OR HESITATIONS OF THE MOVEMENT  B) SLIGHT SLOWING  C) THE AMPLITUDE DECREMENTS NEAR THE END OF THE 10 MOVEMENTS.
AMPLITUDE_LLE: (0) NORMAL: NO TREMOR.
RIGIDITY_NECK: (2) MILD: RIGIDITY DETECTED WITHOUT THE ACTIVATION MANEUVER. FULL RANGE OF MOTION IS EASILY ACHIEVED.

## 2025-08-08 ASSESSMENT — MOVEMENT DISORDERS SOCIETY - UNIFIED PARKINSONS DISEASE RATING SCALE (MDS-UPDRS)
SPEECH: (0) NORMAL: NOT AT ALL (NO PROBLEMS).
CHEWING_AND_SWALLOWING: (0) NORMAL: NO PROBLEMS.
TURNING_IN_BED: (0) NORMAL: NOT AT ALL (NO PROBLEMS).
DRESSING: (1) SLIGHT: I AM SLOW, BUT I DO NOT NEED HELP.
SALIVA_AND_DROOLING: (1) SLIGHT: I HAVE TOO MUCH SALIVA, BUT DO NOT DROOL.
EATING_TASKS: (1) SLIGHT: I AM SLOW, BUT I DO NOT NEED ANY HELP HANDLING MY FOOD AND HAVE NOT HAD FOOD SPILLS WHILE EATING.
HANDWRITING: (2) MILD: SOME WORDS ARE UNCLEAR AND DIFFICULT TO READ.
HYGIENE: (0) NORMAL: NOT AT ALL (NO PROBLEMS).
TREMOR: (2) MILD: SHAKING OR TREMOR CAUSES PROBLEMS WITH ONLY A FEW ACTIVITIES.
HOBBIES_AND_OTHER_ACTIVITIES: (0) NORMAL: NOT AT ALL (NO PROBLEMS).
GETTING_OUT_OF_BED_CAR_DEEP_CHAIR: (1) SLIGHT: I AM SLOW OR AWKWARD, BUT I USUALLY CAN DO IT ON MY FIRST TRY.
TOTAL_SCORE: 11
WALKING_AND_BALANCE: (3) MODERATE: I USUALLY USE A WALKING AID (CANE, WALKER) TO WALK SAFELY WITHOUT FALLING.  HOWEVER, I DO NOT USUALLY NEED THE SUPPORT OF ANOTHER PERSON.
FREEZING: (0) NORMAL: NOT AT ALL (NO PROBLEMS).

## 2025-08-13 ENCOUNTER — OFFICE VISIT (OUTPATIENT)
Dept: PODIATRY | Facility: CLINIC | Age: 80
End: 2025-08-13
Payer: COMMERCIAL

## 2025-08-13 DIAGNOSIS — E11.49 TYPE II OR UNSPECIFIED TYPE DIABETES MELLITUS WITH NEUROLOGICAL MANIFESTATIONS, NOT STATED AS UNCONTROLLED(250.60) (H): ICD-10-CM

## 2025-08-13 DIAGNOSIS — I87.8 VENOUS STASIS: Primary | ICD-10-CM

## 2025-08-13 DIAGNOSIS — L97.322 SKIN ULCER OF LEFT ANKLE WITH FAT LAYER EXPOSED (H): ICD-10-CM

## 2025-08-13 DIAGNOSIS — S80.822S: ICD-10-CM

## 2025-08-13 DIAGNOSIS — E11.610 CHARCOT FOOT DUE TO DIABETES MELLITUS (H): ICD-10-CM

## 2025-08-13 DIAGNOSIS — E11.51 DIABETES MELLITUS WITH PERIPHERAL VASCULAR DISEASE (H): ICD-10-CM

## 2025-08-14 DIAGNOSIS — I70.209 ATHEROSCLEROSIS OF NATIVE ARTERY OF EXTREMITY: ICD-10-CM

## 2025-08-14 DIAGNOSIS — I83.893 SYMPTOMATIC VARICOSE VEINS OF BOTH LOWER EXTREMITIES: Primary | ICD-10-CM

## 2025-08-14 DIAGNOSIS — R09.89 OTHER SPECIFIED SYMPTOMS AND SIGNS INVOLVING THE CIRCULATORY AND RESPIRATORY SYSTEMS: ICD-10-CM

## 2025-08-20 DIAGNOSIS — I87.8 VENOUS STASIS: ICD-10-CM

## 2025-08-20 DIAGNOSIS — E11.40 TYPE 2 DIABETES, CONTROLLED, WITH NEUROPATHY (H): ICD-10-CM

## 2025-08-21 RX ORDER — AMMONIUM LACTATE 12 G/100G
CREAM TOPICAL DAILY
Qty: 385 G | Refills: 3 | Status: SHIPPED | OUTPATIENT
Start: 2025-08-21

## 2025-08-27 ENCOUNTER — OFFICE VISIT (OUTPATIENT)
Dept: PODIATRY | Facility: CLINIC | Age: 80
End: 2025-08-27
Payer: COMMERCIAL

## 2025-08-27 DIAGNOSIS — L97.911 ULCER OF RIGHT LOWER EXTREMITY, LIMITED TO BREAKDOWN OF SKIN (H): ICD-10-CM

## 2025-08-27 DIAGNOSIS — L97.322 SKIN ULCER OF LEFT ANKLE WITH FAT LAYER EXPOSED (H): ICD-10-CM

## 2025-08-27 DIAGNOSIS — E11.51 DIABETES MELLITUS WITH PERIPHERAL VASCULAR DISEASE (H): Primary | ICD-10-CM

## 2025-08-27 DIAGNOSIS — L97.922 SKIN ULCER OF LEFT LOWER LEG WITH FAT LAYER EXPOSED (H): ICD-10-CM

## 2025-08-27 DIAGNOSIS — E11.49 TYPE II OR UNSPECIFIED TYPE DIABETES MELLITUS WITH NEUROLOGICAL MANIFESTATIONS, NOT STATED AS UNCONTROLLED(250.60) (H): ICD-10-CM

## 2025-09-02 ENCOUNTER — MYC MEDICAL ADVICE (OUTPATIENT)
Dept: PODIATRY | Facility: CLINIC | Age: 80
End: 2025-09-02
Payer: COMMERCIAL

## (undated) DEVICE — GLOVE PROTEXIS BLUE W/NEU-THERA 8.0  2D73EB80

## (undated) DEVICE — Device

## (undated) DEVICE — EYE CANN IRR 25GA CYSTOTOME 581610

## (undated) DEVICE — POSITIONER ARMBOARD FOAM 1PAIR LF FP-ARMB1

## (undated) DEVICE — DRSG PRIMAPORE 03 1/8X6" 66000318

## (undated) DEVICE — SU UMBILICAL TAPE .125X30" U11T

## (undated) DEVICE — LINEN TOWEL PACK X5 5464

## (undated) DEVICE — SUCTION MANIFOLD NEPTUNE 2 SYS 4 PORT 0702-020-000

## (undated) DEVICE — DRAPE STERI TOWEL LG 1010

## (undated) DEVICE — LINEN TOWEL PACK X30 5481

## (undated) DEVICE — INFLATION DEVICE ENCORE  26 PRESSURE GAUGE M001151050

## (undated) DEVICE — ESU PENCIL W/SMOKE EVAC NEPTUNE STRYKER 0703-046-000

## (undated) DEVICE — SOL NACL 0.9% IRRIG 1000ML BOTTLE 2F7124

## (undated) DEVICE — DRAPE IOBAN INCISE 23X17" 6650EZ

## (undated) DEVICE — DRSG KERLIX 4 1/2"X4YDS ROLL 6715

## (undated) DEVICE — EYE KNIFE STILETTO VISITEC 1.1MM ANG 45DEG SIDEPORT 376620

## (undated) DEVICE — LINEN TOWEL PACK X6 WHITE 5487

## (undated) DEVICE — DRAPE SHEET REV FOLD 3/4 9349

## (undated) DEVICE — SU DERMABOND ADVANCED .7ML DNX12

## (undated) DEVICE — PREP CHLORAPREP 26ML TINTED ORANGE  260815

## (undated) DEVICE — GLOVE BIOGEL PI MICRO SZ 7.0 48570

## (undated) DEVICE — CATH ANGIO ANG GLIDE 5FRX65CM CG507

## (undated) DEVICE — SUCTION IRR SYSTEM W/O TIP INTERPULSE HANDPIECE 0210-100-000

## (undated) DEVICE — SOL WATER IRRIG 1000ML BOTTLE 2F7114

## (undated) DEVICE — VESSEL LOOPS YELLOW MAXI 31145694

## (undated) DEVICE — SU SILK 3-0 TIE 12X30" A304H

## (undated) DEVICE — WIPES FOLEY CARE SURESTEP PROVON DFC100

## (undated) DEVICE — SU PROLENE 7-0 BV-1DA 18" 8701H

## (undated) DEVICE — KIT MICRO-INTRODUCER STIFFEN 4FR 7274V

## (undated) DEVICE — SU SILK 2-0 TIE 12X30" A305H

## (undated) DEVICE — IMM PILLOW ABDUCT HIP MED 31143061

## (undated) DEVICE — CLIP HORIZON SM RED WIDE SLOT 001201

## (undated) DEVICE — DECANTER VIAL 2006S

## (undated) DEVICE — EYE TIP IRRIGATION & ASPIRATION POLYMER CVD 0.3MM 8065751512

## (undated) DEVICE — BONE CLEANING TIP INTERPULSE FEMORAL CANAL 0210-008-000

## (undated) DEVICE — INTRO SHEATH BRITE TIP 6FRX11CM 401-611M

## (undated) DEVICE — LINEN BACK PACK 5440

## (undated) DEVICE — POSITIONER ABDUCTION PILLOW FOAM MED FP-ABDUCTM

## (undated) DEVICE — SU PROLENE 4-0 RB-1DA 36" 8557H

## (undated) DEVICE — ESU ELEC BLADE 2.75" COATED/INSULATED E1455

## (undated) DEVICE — CLIP HORIZON MED BLUE 002200

## (undated) DEVICE — EYE CANN IRR 27GA ANTERIOR CHAMBER 581280

## (undated) DEVICE — BONE CEMENT MIXEVAC HI VAC W/CARTRIDGE 0306-563-000

## (undated) DEVICE — GLOVE PROTEXIS MICRO 7.5  2D73PM75

## (undated) DEVICE — EYE PACK CUSTOM ANTERIOR 30DEG TIP CENTURION PPK6682-04

## (undated) DEVICE — SU ETHIBOND 1 CT-1 30" X425H

## (undated) DEVICE — SYR 30ML LL W/O NDL 302832

## (undated) DEVICE — PACK TOTAL HIP W/POUCH RIVERSIDE LATEX FREE

## (undated) DEVICE — HOOD T4 PROTECTIVE STERI FACE SHIELD 400-800

## (undated) DEVICE — GEL ULTRASOUND AQUASONIC 20GM 01-01

## (undated) DEVICE — SU VICRYL 3-0 SH 27" UND J416H

## (undated) DEVICE — INTRO SHEATH BRITE TIP 5FRX11CM 401-511M

## (undated) DEVICE — DRSG AQUACEL AG HYDROFIBER  3.5X10" 422605

## (undated) DEVICE — GOWN IMPERVIOUS BREATHABLE SMART XLG 89045

## (undated) DEVICE — DRAPE STERI FLUOROSCOPE 35X43"1012 LATEX FREE

## (undated) DEVICE — EYE KNIFE SLIT XSTAR VISITEC 2.6MM 45DEG 373726

## (undated) DEVICE — DRAPE POUCH INSTRUMENT 1018

## (undated) DEVICE — PREP CHLORAPREP 26ML TINTED HI-LITE ORANGE 930815

## (undated) DEVICE — STRAP STIRRUP W/SLIP 30187-030

## (undated) DEVICE — LABEL MEDICATION SYSTEM 3303-P

## (undated) DEVICE — COVER EASY EQUIP BAG W/BAND LATEX FREE EZ-28

## (undated) DEVICE — GUIDEWIRE TERUMO .035X180 ANG GR3508

## (undated) DEVICE — SU PROLENE 5-0 RB-2DA 18" 8713H

## (undated) DEVICE — SU MONOCRYL 4-0 PS-2 18" UND Y496G

## (undated) DEVICE — PACK TOTAL HIP W/POUCH SOP15HPFSM

## (undated) DEVICE — BONE CLEANING TIP INTERPULSE  0210-010-000

## (undated) DEVICE — WIRE GUIDE AMPLATZ SUPER STIFF 0.035"X260CM STR M001465090

## (undated) DEVICE — NDL 30GA 0.5" 305106

## (undated) DEVICE — SU SILK 4-0 TIE 12X30" A303H

## (undated) DEVICE — SU PROLENE 3-0 SHDA 36" 8522H

## (undated) DEVICE — BLADE CLIPPER SGL USE 9680

## (undated) DEVICE — INSERT FOGARTY 33MM TRACTION HYDRAJAW HYDRA33

## (undated) DEVICE — GOWN XLG DISP 9545

## (undated) DEVICE — VESSEL LOOPS RED MINI 31145710

## (undated) DEVICE — BONE CLEANING TIP INTERPULSE SHORT

## (undated) DEVICE — BLADE SAW SAGITTAL STRK 18X90X1.27MM HD SYS 6 6118-127-090

## (undated) DEVICE — DRSG STERI STRIP 1/2X4" R1547

## (undated) DEVICE — SPONGE SURGIFOAM 100 1974

## (undated) DEVICE — CATH TRAY FOLEY SURESTEP 16FR W/URNE MTR STLK LATEX A303316A

## (undated) DEVICE — DRAPE CONVERTORS U-DRAPE 60X72" 8476

## (undated) DEVICE — SUTURE BOOTS 051003PBX

## (undated) DEVICE — HOOD SURG T7PLUS PEEL AWAY FACE SHIELD STRL LF 0416-801-100

## (undated) DEVICE — SU MONOCRYL 3-0 PS-1 27" Y936H

## (undated) DEVICE — SU SILK 0 TIE 6X30" A306H

## (undated) DEVICE — SYR KIT ANGIO YELLOW CONTRAST 10ML  K01-60102

## (undated) DEVICE — SU PROLENE 6-0 RB-2DA 30" 8711H

## (undated) DEVICE — SU VICRYL 4-0 PS-2 18" UND J496H

## (undated) DEVICE — BLADE SAW SAGITTAL STRK 25X90X1.27MM HD SYS 6 6125-127-090

## (undated) DEVICE — BLADE KNIFE SURG 11 371111

## (undated) DEVICE — PACK CATARACT CUSTOM ASC SEY15CPUMC

## (undated) DEVICE — DRAPE STERI INCISE 1050

## (undated) DEVICE — GLOVE PROTEXIS BLUE W/NEU-THERA 8.5  2D73EB85

## (undated) DEVICE — SU STRATAFIX 2-0 MH 36" SXPD2B412

## (undated) DEVICE — SUCTION MANIFOLD DORNOCH ULTRA CART UL-CL500

## (undated) DEVICE — PACK GOWN 3/PK DISP XL SBA32GPFCB

## (undated) DEVICE — SU VICRYL 3-0 SH 27" J316H

## (undated) DEVICE — SU VICRYL 2-0 SH 27" UND J417H

## (undated) DEVICE — SU VICRYL 0 CT-1 27" J340H

## (undated) DEVICE — LINEN MAYO STAND COVER OVERSIZE PACK 5458

## (undated) DEVICE — TOURNIQUET VASCULAR KIT ARGYLE 8888-585000

## (undated) DEVICE — SU PROLENE 5-0 C-1 DA 24" 8725H

## (undated) DEVICE — DRAPE WARMER 66X44" ORS-300

## (undated) DEVICE — SPONGE PACK VAGINAL 2"X9

## (undated) DEVICE — EYE SHIELD PLASTIC

## (undated) DEVICE — CATH EP SPECTRANETICS QUICK CROSS 4.8-3.7FRX135CM 518-037

## (undated) DEVICE — TAPE CLOTH 3" CARDINAL 3TRCL03

## (undated) DEVICE — CATH ANGION PIGTAIL ACCU-VU 5FRX70CM SIZING 13709801

## (undated) DEVICE — SOL NACL 0.9% IRRIG 3000ML BAG 2B7477

## (undated) DEVICE — ADH SKIN CLOSURE PREMIERPRO EXOFIN 1.0ML 3470

## (undated) DEVICE — SU VICRYL 2-0 CT-1 27" UND J259H

## (undated) DEVICE — SU PROLENE 5-0 C-1DA 36" 8720H

## (undated) DEVICE — STRAP KNEE/BODY 31143004

## (undated) DEVICE — ESU GROUND PAD UNIVERSAL W/O CORD

## (undated) DEVICE — GLOVE PROTEXIS POWDER FREE 8.0 ORTHOPEDIC 2D73ET80

## (undated) DEVICE — DEVICE MULTI TORQUE TD01

## (undated) DEVICE — DRSG AQUACEL AG 3.5X9.75" HYDROFIBER 412011

## (undated) DEVICE — DRAPE U-DRAPE 1015NSD NON-STERILE

## (undated) DEVICE — PITCHER STERILE 1000ML  SSK9004A

## (undated) DEVICE — ESU GROUND PAD ADULT REM W/15' CORD E7507DB

## (undated) DEVICE — GLIDEWIRE TERUMO .035X180 ANG STIFF GS3508

## (undated) DEVICE — SU PROLENE 6-0 C-1DA 30" 8706H

## (undated) DEVICE — DRSG GAUZE 4X4" TRAY 6939

## (undated) DEVICE — SU PROLENE 5-0 RB-1DA 36"  8556H

## (undated) DEVICE — SU VICRYL 1 CT-1 36" UND J947H

## (undated) RX ORDER — HEPARIN SODIUM 1000 [USP'U]/ML
INJECTION, SOLUTION INTRAVENOUS; SUBCUTANEOUS
Status: DISPENSED
Start: 2021-01-12

## (undated) RX ORDER — FENTANYL CITRATE 50 UG/ML
INJECTION, SOLUTION INTRAMUSCULAR; INTRAVENOUS
Status: DISPENSED
Start: 2017-08-27

## (undated) RX ORDER — REGADENOSON 0.08 MG/ML
INJECTION, SOLUTION INTRAVENOUS
Status: DISPENSED
Start: 2018-01-25

## (undated) RX ORDER — SODIUM CHLORIDE 9 MG/ML
INJECTION, SOLUTION INTRAVENOUS
Status: DISPENSED
Start: 2018-03-08

## (undated) RX ORDER — ADENOSINE 3 MG/ML
INJECTION, SOLUTION INTRAVENOUS
Status: DISPENSED
Start: 2018-03-01

## (undated) RX ORDER — ONDANSETRON 2 MG/ML
INJECTION INTRAMUSCULAR; INTRAVENOUS
Status: DISPENSED
Start: 2019-11-04

## (undated) RX ORDER — FENTANYL CITRATE 50 UG/ML
INJECTION, SOLUTION INTRAMUSCULAR; INTRAVENOUS
Status: DISPENSED
Start: 2018-03-08

## (undated) RX ORDER — HYDROMORPHONE HYDROCHLORIDE 1 MG/ML
INJECTION, SOLUTION INTRAMUSCULAR; INTRAVENOUS; SUBCUTANEOUS
Status: DISPENSED
Start: 2017-08-27

## (undated) RX ORDER — PHENYLEPHRINE HCL IN 0.9% NACL 1 MG/10 ML
SYRINGE (ML) INTRAVENOUS
Status: DISPENSED
Start: 2017-08-27

## (undated) RX ORDER — ONDANSETRON 2 MG/ML
INJECTION INTRAMUSCULAR; INTRAVENOUS
Status: DISPENSED
Start: 2021-01-12

## (undated) RX ORDER — PROTAMINE SULFATE 10 MG/ML
INJECTION, SOLUTION INTRAVENOUS
Status: DISPENSED
Start: 2018-09-14

## (undated) RX ORDER — AMINOPHYLLINE 25 MG/ML
INJECTION, SOLUTION INTRAVENOUS
Status: DISPENSED
Start: 2018-01-25

## (undated) RX ORDER — ONDANSETRON 2 MG/ML
INJECTION INTRAMUSCULAR; INTRAVENOUS
Status: DISPENSED
Start: 2018-09-14

## (undated) RX ORDER — NITROGLYCERIN 5 MG/ML
VIAL (ML) INTRAVENOUS
Status: DISPENSED
Start: 2018-03-01

## (undated) RX ORDER — FENTANYL CITRATE 50 UG/ML
INJECTION, SOLUTION INTRAMUSCULAR; INTRAVENOUS
Status: DISPENSED
Start: 2019-10-21

## (undated) RX ORDER — LIDOCAINE HYDROCHLORIDE 20 MG/ML
INJECTION, SOLUTION EPIDURAL; INFILTRATION; INTRACAUDAL; PERINEURAL
Status: DISPENSED
Start: 2017-08-27

## (undated) RX ORDER — FENTANYL CITRATE 50 UG/ML
INJECTION, SOLUTION INTRAMUSCULAR; INTRAVENOUS
Status: DISPENSED
Start: 2018-04-18

## (undated) RX ORDER — ONDANSETRON 2 MG/ML
INJECTION INTRAMUSCULAR; INTRAVENOUS
Status: DISPENSED
Start: 2023-06-30

## (undated) RX ORDER — GLYCOPYRROLATE 0.2 MG/ML
INJECTION, SOLUTION INTRAMUSCULAR; INTRAVENOUS
Status: DISPENSED
Start: 2017-08-27

## (undated) RX ORDER — FENTANYL CITRATE-0.9 % NACL/PF 10 MCG/ML
PLASTIC BAG, INJECTION (ML) INTRAVENOUS
Status: DISPENSED
Start: 2023-06-30

## (undated) RX ORDER — HEPARIN SODIUM 1000 [USP'U]/ML
INJECTION, SOLUTION INTRAVENOUS; SUBCUTANEOUS
Status: DISPENSED
Start: 2018-03-01

## (undated) RX ORDER — HEPARIN SODIUM 1000 [USP'U]/ML
INJECTION, SOLUTION INTRAVENOUS; SUBCUTANEOUS
Status: DISPENSED
Start: 2017-12-19

## (undated) RX ORDER — DEXMEDETOMIDINE HYDROCHLORIDE 4 UG/ML
INJECTION, SOLUTION INTRAVENOUS
Status: DISPENSED
Start: 2019-10-21

## (undated) RX ORDER — PROPOFOL 10 MG/ML
INJECTION, EMULSION INTRAVENOUS
Status: DISPENSED
Start: 2023-06-30

## (undated) RX ORDER — SILVER SULFADIAZINE 10 MG/G
CREAM TOPICAL
Status: DISPENSED
Start: 2017-10-09

## (undated) RX ORDER — HYDROMORPHONE HYDROCHLORIDE 1 MG/ML
INJECTION, SOLUTION INTRAMUSCULAR; INTRAVENOUS; SUBCUTANEOUS
Status: DISPENSED
Start: 2018-09-14

## (undated) RX ORDER — SODIUM CHLORIDE 9 MG/ML
INJECTION, SOLUTION INTRAVENOUS
Status: DISPENSED
Start: 2018-09-14

## (undated) RX ORDER — DEXAMETHASONE SODIUM PHOSPHATE 4 MG/ML
INJECTION, SOLUTION INTRA-ARTICULAR; INTRALESIONAL; INTRAMUSCULAR; INTRAVENOUS; SOFT TISSUE
Status: DISPENSED
Start: 2021-01-12

## (undated) RX ORDER — FENTANYL CITRATE 50 UG/ML
INJECTION, SOLUTION INTRAMUSCULAR; INTRAVENOUS
Status: DISPENSED
Start: 2019-11-04

## (undated) RX ORDER — ROCURONIUM BROMIDE 50 MG/5 ML
SYRINGE (ML) INTRAVENOUS
Status: DISPENSED
Start: 2017-08-27

## (undated) RX ORDER — LIDOCAINE HYDROCHLORIDE 20 MG/ML
INJECTION, SOLUTION EPIDURAL; INFILTRATION; INTRACAUDAL; PERINEURAL
Status: DISPENSED
Start: 2018-09-14

## (undated) RX ORDER — EPHEDRINE SULFATE 50 MG/ML
INJECTION, SOLUTION INTRAMUSCULAR; INTRAVENOUS; SUBCUTANEOUS
Status: DISPENSED
Start: 2017-08-27

## (undated) RX ORDER — CEFAZOLIN SODIUM 1 G/3ML
INJECTION, POWDER, FOR SOLUTION INTRAMUSCULAR; INTRAVENOUS
Status: DISPENSED
Start: 2017-08-27

## (undated) RX ORDER — PROPOFOL 10 MG/ML
INJECTION, EMULSION INTRAVENOUS
Status: DISPENSED
Start: 2021-01-12

## (undated) RX ORDER — DEXAMETHASONE SODIUM PHOSPHATE 4 MG/ML
INJECTION, SOLUTION INTRA-ARTICULAR; INTRALESIONAL; INTRAMUSCULAR; INTRAVENOUS; SOFT TISSUE
Status: DISPENSED
Start: 2023-06-30

## (undated) RX ORDER — PROPOFOL 10 MG/ML
INJECTION, EMULSION INTRAVENOUS
Status: DISPENSED
Start: 2018-09-14

## (undated) RX ORDER — SIMETHICONE 20 MG/.3ML
EMULSION ORAL
Status: DISPENSED
Start: 2019-06-12

## (undated) RX ORDER — HEPARIN SODIUM 1000 [USP'U]/ML
INJECTION, SOLUTION INTRAVENOUS; SUBCUTANEOUS
Status: DISPENSED
Start: 2018-09-14

## (undated) RX ORDER — FENTANYL CITRATE 50 UG/ML
INJECTION, SOLUTION INTRAMUSCULAR; INTRAVENOUS
Status: DISPENSED
Start: 2017-12-19

## (undated) RX ORDER — FENTANYL CITRATE 50 UG/ML
INJECTION, SOLUTION INTRAMUSCULAR; INTRAVENOUS
Status: DISPENSED
Start: 2018-03-01

## (undated) RX ORDER — ACETAMINOPHEN 325 MG/1
TABLET ORAL
Status: DISPENSED
Start: 2021-01-12

## (undated) RX ORDER — ACETAMINOPHEN 325 MG/1
TABLET ORAL
Status: DISPENSED
Start: 2019-10-21

## (undated) RX ORDER — NITROGLYCERIN 5 MG/ML
VIAL (ML) INTRAVENOUS
Status: DISPENSED
Start: 2018-03-08

## (undated) RX ORDER — FENTANYL CITRATE 50 UG/ML
INJECTION, SOLUTION INTRAMUSCULAR; INTRAVENOUS
Status: DISPENSED
Start: 2021-01-12

## (undated) RX ORDER — LIDOCAINE HYDROCHLORIDE 20 MG/ML
INJECTION, SOLUTION EPIDURAL; INFILTRATION; INTRACAUDAL; PERINEURAL
Status: DISPENSED
Start: 2021-01-12

## (undated) RX ORDER — CLOPIDOGREL BISULFATE 75 MG/1
TABLET ORAL
Status: DISPENSED
Start: 2018-03-08

## (undated) RX ORDER — EPHEDRINE SULFATE 50 MG/ML
INJECTION, SOLUTION INTRAMUSCULAR; INTRAVENOUS; SUBCUTANEOUS
Status: DISPENSED
Start: 2021-01-12

## (undated) RX ORDER — SODIUM CHLORIDE 9 MG/ML
INJECTION, SOLUTION INTRAVENOUS
Status: DISPENSED
Start: 2021-01-12

## (undated) RX ORDER — LIDOCAINE HYDROCHLORIDE 10 MG/ML
INJECTION, SOLUTION EPIDURAL; INFILTRATION; INTRACAUDAL; PERINEURAL
Status: DISPENSED
Start: 2017-12-19

## (undated) RX ORDER — DEXTROSE MONOHYDRATE 25 G/50ML
INJECTION, SOLUTION INTRAVENOUS
Status: DISPENSED
Start: 2017-12-19

## (undated) RX ORDER — IOPAMIDOL 755 MG/ML
INJECTION, SOLUTION INTRAVASCULAR
Status: DISPENSED
Start: 2018-09-14

## (undated) RX ORDER — ONDANSETRON 2 MG/ML
INJECTION INTRAMUSCULAR; INTRAVENOUS
Status: DISPENSED
Start: 2017-08-27

## (undated) RX ORDER — FENTANYL CITRATE 50 UG/ML
INJECTION, SOLUTION INTRAMUSCULAR; INTRAVENOUS
Status: DISPENSED
Start: 2018-09-14

## (undated) RX ORDER — LABETALOL HYDROCHLORIDE 5 MG/ML
INJECTION, SOLUTION INTRAVENOUS
Status: DISPENSED
Start: 2018-09-14

## (undated) RX ORDER — CEFAZOLIN SODIUM 2 G/100ML
INJECTION, SOLUTION INTRAVENOUS
Status: DISPENSED
Start: 2018-09-14

## (undated) RX ORDER — ASPIRIN 325 MG
TABLET ORAL
Status: DISPENSED
Start: 2018-03-08

## (undated) RX ORDER — EPHEDRINE SULFATE 50 MG/ML
INJECTION, SOLUTION INTRAMUSCULAR; INTRAVENOUS; SUBCUTANEOUS
Status: DISPENSED
Start: 2023-06-30

## (undated) RX ORDER — SODIUM CHLORIDE 9 MG/ML
INJECTION, SOLUTION INTRAVENOUS
Status: DISPENSED
Start: 2017-12-19

## (undated) RX ORDER — PROPOFOL 10 MG/ML
INJECTION, EMULSION INTRAVENOUS
Status: DISPENSED
Start: 2017-08-27

## (undated) RX ORDER — HEPARIN SODIUM 1000 [USP'U]/ML
INJECTION, SOLUTION INTRAVENOUS; SUBCUTANEOUS
Status: DISPENSED
Start: 2018-03-08

## (undated) RX ORDER — SODIUM CHLORIDE 9 MG/ML
INJECTION, SOLUTION INTRAVENOUS
Status: DISPENSED
Start: 2018-03-01

## (undated) RX ORDER — CEFAZOLIN SODIUM 2 G/100ML
INJECTION, SOLUTION INTRAVENOUS
Status: DISPENSED
Start: 2021-01-12

## (undated) RX ORDER — SODIUM CHLORIDE, SODIUM LACTATE, POTASSIUM CHLORIDE, CALCIUM CHLORIDE 600; 310; 30; 20 MG/100ML; MG/100ML; MG/100ML; MG/100ML
INJECTION, SOLUTION INTRAVENOUS
Status: DISPENSED
Start: 2017-08-27

## (undated) RX ORDER — FENTANYL CITRATE 50 UG/ML
INJECTION, SOLUTION INTRAMUSCULAR; INTRAVENOUS
Status: DISPENSED
Start: 2019-06-12

## (undated) RX ORDER — FENTANYL CITRATE-0.9 % NACL/PF 10 MCG/ML
PLASTIC BAG, INJECTION (ML) INTRAVENOUS
Status: DISPENSED
Start: 2021-01-12

## (undated) RX ORDER — CLOPIDOGREL BISULFATE 75 MG/1
TABLET ORAL
Status: DISPENSED
Start: 2021-01-12

## (undated) RX ORDER — ASPIRIN 325 MG
TABLET ORAL
Status: DISPENSED
Start: 2018-03-01